# Patient Record
Sex: MALE | Race: WHITE | NOT HISPANIC OR LATINO | Employment: PART TIME | URBAN - METROPOLITAN AREA
[De-identification: names, ages, dates, MRNs, and addresses within clinical notes are randomized per-mention and may not be internally consistent; named-entity substitution may affect disease eponyms.]

---

## 2020-03-11 ENCOUNTER — OFFICE VISIT (OUTPATIENT)
Dept: PEDIATRICS | Facility: CLINIC | Age: 50
End: 2020-03-11
Payer: COMMERCIAL

## 2020-03-11 VITALS
BODY MASS INDEX: 32.96 KG/M2 | HEART RATE: 119 BPM | TEMPERATURE: 98.9 F | RESPIRATION RATE: 18 BRPM | SYSTOLIC BLOOD PRESSURE: 157 MMHG | WEIGHT: 229.7 LBS | OXYGEN SATURATION: 96 % | DIASTOLIC BLOOD PRESSURE: 105 MMHG

## 2020-03-11 DIAGNOSIS — Z78.9 ALCOHOL USE: ICD-10-CM

## 2020-03-11 DIAGNOSIS — Z72.0 TOBACCO ABUSE DISORDER: ICD-10-CM

## 2020-03-11 DIAGNOSIS — I10 ESSENTIAL HYPERTENSION: ICD-10-CM

## 2020-03-11 DIAGNOSIS — S06.0X0D CONCUSSION WITHOUT LOSS OF CONSCIOUSNESS, SUBSEQUENT ENCOUNTER: ICD-10-CM

## 2020-03-11 PROBLEM — S06.0X0A CONCUSSION WITHOUT LOSS OF CONSCIOUSNESS: Status: ACTIVE | Noted: 2020-03-11

## 2020-03-11 PROBLEM — F10.90 ALCOHOL USE: Status: ACTIVE | Noted: 2020-03-11

## 2020-03-11 PROCEDURE — 99204 OFFICE O/P NEW MOD 45 MIN: CPT | Performed by: INTERNAL MEDICINE

## 2020-03-11 RX ORDER — LISINOPRIL 10 MG/1
10 TABLET ORAL DAILY
Qty: 60 TABLET | Refills: 0 | Status: SHIPPED | OUTPATIENT
Start: 2020-03-11 | End: 2020-05-01

## 2020-03-11 ASSESSMENT — PAIN SCALES - GENERAL: PAINLEVEL: NO PAIN (0)

## 2020-03-11 NOTE — PROGRESS NOTES
Subjective     Mary Lopez is a 49 year old male who presents to clinic today for the following health issues:    HPI   ED/UC Followup:    Facility:  [facility]  Date of visit: 03/08/2020  Reason for visit: Concussion of the head after pt suffered a fall  Current Status: Pt states on today visit that he has been feeling much better after he was seeing at the ER but still recovering from the concussion.     49-year-old gentleman comes in for follow-up of concussion.  States in the evening of 3/7/2020 he slipped on a staircase hitting his head against the wall causing a dent on the sheet rock.  He does not feel that he had lost consciousness.  He had drank alcohol that evening.  He feels he was not drunk.  The next day he his head felt heavy and the brain felt foggy.  His wife felt that his speech was not fluent.  So she brought him to the emergency room.  CT of the brain was negative.  He had a prior history of concussion at age 13.    Every day he has improved.  His brain is not feeling as foggy now.  His speech is returned to normal though he feels it is perhaps not 100% back.  According to his wife his memory seems intact.    Patient's blood pressure in the clinic was elevated and he gives history of having elevated blood pressure for last few years.  Never started on any medication.  He eats quite a bit of salt.  Confesses to drinking at least 2 shots of liquor like vodka a day.  No DWI.  Does not feel that he is an alcoholic.  He has had periods where he has not been drinking.  He chews tobacco and occasionally will smoke a cigar.      Patient Active Problem List   Diagnosis     Concussion without loss of consciousness     Essential hypertension     Tobacco abuse disorder     Alcohol use     History reviewed. No pertinent surgical history.    Social History     Tobacco Use     Smoking status: Never Smoker     Smokeless tobacco: Former User     Types: Chew   Substance Use Topics      Alcohol use: Not on file     History reviewed. No pertinent family history.      Current Outpatient Medications   Medication Sig Dispense Refill     lisinopril (ZESTRIL) 10 MG tablet Take 1 tablet (10 mg) by mouth daily 60 tablet 0     NO ACTIVE MEDICATIONS .       Allergies   Allergen Reactions     Fish Swelling     Food      baked beans         Reviewed and updated as needed this visit by Provider  Tobacco  Allergies  Meds  Problems  Med Hx  Surg Hx  Fam Hx         Review of Systems   ROS COMP: Constitutional, HEENT, cardiovascular, pulmonary, GI, , musculoskeletal, neuro, skin, endocrine and psych systems are negative, except as otherwise noted.      Objective    BP (!) 157/105 (BP Location: Right arm, Patient Position: Sitting, Cuff Size: Adult Large)   Pulse 119   Temp 98.9  F (37.2  C) (Oral)   Resp 18   Wt 104.2 kg (229 lb 11.2 oz)   SpO2 96%   BMI 32.96 kg/m    Body mass index is 32.96 kg/m .  Physical Exam   GENERAL: healthy, alert and no distress  EYES: Eyes grossly normal to inspection, PERRL and conjunctivae and sclerae normal  HENT: ear canals and TM's normal, nose and mouth without ulcers or lesions  NECK: no adenopathy, no asymmetry, masses, or scars and thyroid normal to palpation  RESP: lungs clear to auscultation - no rales, rhonchi or wheezes  CV: regular rate and rhythm, normal S1 S2, no S3 or S4, no murmur, click or rub, no peripheral edema and peripheral pulses strong  ABDOMEN: soft, nontender, no hepatosplenomegaly, no masses and bowel sounds normal  MS: no gross musculoskeletal defects noted, no edema  NEURO: Normal strength and tone, mentation intact and speech normal.  Patient's orientation x3 was normal.  Recall was good.    Diagnostic Test Results:  Labs reviewed in Epic        Assessment & Plan     1.  Concussion without loss of consciousness.  He is recovering well.  I think he could return to work by March 16, 2020.  A letter was given.  2.  Essential hypertension.   Stressed importance of low-salt diet.  Stressed the importance starting a medication for his hypertension.  He agreed and I placed him on lisinopril  10 mg a day potential side effects including electrolyte imbalance, cough and angioedema discussed.  He will return for follow-up in him month with BMP.  3.  Tobacco abuse disorder.  He chews tobacco.  Counseled regarding that.  4.  Alcohol abuse.  Advise to quit alcohol or at least reduce it dramatically.  5.  He had a normal lipid profile January 2019 at [facility].           Return in about 4 weeks (around 4/8/2020) for visit with fasting lab.    Jimmie Hoyt MD  [facility]

## 2020-03-11 NOTE — LETTER
March 11, 2020      Mary Lopez  [address] [address]        To Whom It May Concern:    Mary Lopez was seen in our clinic. He may return to work on 03/16/2020.      Sincerely,        Jimmie Hoyt MD

## 2020-03-11 NOTE — LETTER
March 11, 2020      Mary EPPERSON Jessica  [address] [address]        To Whom It May Concern:    Mary EPPERSON Jessica was seen in our clinic. He may return to work on 02/16/2020    Sincerely,        Jimmie Hoyt MD

## 2020-03-22 ENCOUNTER — HEALTH MAINTENANCE LETTER (OUTPATIENT)
Age: 50
End: 2020-03-22

## 2020-04-06 ENCOUNTER — MYC MEDICAL ADVICE (OUTPATIENT)
Dept: PEDIATRICS | Facility: CLINIC | Age: 50
End: 2020-04-06

## 2020-04-28 ENCOUNTER — DOCUMENTATION ONLY (OUTPATIENT)
Dept: LAB | Facility: CLINIC | Age: 50
End: 2020-04-28

## 2020-04-28 NOTE — PROGRESS NOTES
Patient has lab only appointment scheduled for 5/2/20.   Due to current coronavirus pandemic, please consider whether these lab tests can be deferred.     Please open ORDER REVIEW, review orders, and then confirm or defer orders ASAP.  Enter <dot>keep or <dot>defer smart phrase into NOTES, complete documentation, and route encounter.  [facility]: /team  [facility]: individual provider pool  [facility] primary care:   [facility] specialty: scheduling paty Luciano on 4/28/2020 at 4:39 PM

## 2020-04-29 DIAGNOSIS — I10 ESSENTIAL HYPERTENSION: ICD-10-CM

## 2020-04-29 NOTE — TELEPHONE ENCOUNTER
Health Call Center    Phone Message    May a detailed message be left on voicemail: yes     Reason for Call: Medication Refill Request    Has the patient contacted the pharmacy for the refill? Yes   Name of medication being requested: lisinopril  Provider who prescribed the medication: Lai  Pharm[facility]: [facility]  Date medication is needed: 5/13/2020, patient states he has about 2 weeks left       Spoke with patient about rescheduling lab from 5/2 to lab and visit with provider in late June or early July per staff message from Dr Hoyt.  Patient to call back to schedule.    Action Taken: Message routed to:  Primary Care p [phone]    Travel Screening: Not Applicable

## 2020-04-29 NOTE — TELEPHONE ENCOUNTER
Lisinopril  Last Written Prescription Date:  3/11/2020  Last Fill Quantity: 60,  # refills: 0   Last office visit: 3/11/2020 with prescribing provider:  Lai Hopper Office Visit:

## 2020-04-30 NOTE — TELEPHONE ENCOUNTER
Centralized Medication Refill Team note:     lisinopril (ZESTRIL) 10 MG tablet    Last Written Prescription Date:  3/11/20  Last Fill Quantity: 60,   # refills: 0  Last Office Visit : 3/11/20  Future Office visit:  None, recent cancellations.    Routing refill request to provider for review/approval because:    Fails refill protocol due to labs overdue( orders in place)  Cr 1/14/2019 0.70(L)  K 1/14/2019 =3.5  Elevated BP:  03/11/20 (!) 157/105   10/16/15 (!) 169/115   05/02/10 124/77

## 2020-05-01 RX ORDER — LISINOPRIL 10 MG/1
10 TABLET ORAL DAILY
Qty: 60 TABLET | Refills: 0 | Status: SHIPPED | OUTPATIENT
Start: 2020-05-01 | End: 2020-07-06

## 2020-06-24 ENCOUNTER — TELEPHONE (OUTPATIENT)
Dept: PEDIATRICS | Facility: CLINIC | Age: 50
End: 2020-06-24

## 2020-06-24 NOTE — TELEPHONE ENCOUNTER
----- Message from Ai Flaherty sent at 4/29/2020 11:45 AM CDT -----  Regarding: late June/July  Spoke with patient, requests Saturday if possible  ----- Message -----  From: Jimmie Hoyt MD  Sent: 4/28/2020   4:47 PM CDT  To: Ai Flaherty    Please call patient and inform him I like to reschedule his lab to feature in 2 to 3 months. I want to see him in clinic with lab for follow-up on hypertension and blood pressure medication. I will place lab order to be done in 2 months

## 2020-06-24 NOTE — TELEPHONE ENCOUNTER
Left voicemail for patient stating we will not have Saturday clinics in July or August.  Offered [facility] location, provided phone number, as they have Saturday hours and patient lives in [place].    Dodie Flaherty  Primary Care   [facility] [place]

## 2020-07-03 ENCOUNTER — DOCUMENTATION ONLY (OUTPATIENT)
Dept: LAB | Facility: CLINIC | Age: 50
End: 2020-07-03

## 2020-07-03 DIAGNOSIS — I10 ESSENTIAL HYPERTENSION: Primary | ICD-10-CM

## 2020-07-03 NOTE — PROGRESS NOTES
Your patient has a lab appointment on 7/5/20 for fasting labs. They are due for a lipid panel and at this time there is only a BMP ordered. Please sign order for Lipid panel today. Thank you.    [facility]  Zeinab Luciano on 7/3/2020 at 8:02 AM

## 2020-07-05 DIAGNOSIS — I10 ESSENTIAL HYPERTENSION: ICD-10-CM

## 2020-07-05 PROCEDURE — 80048 BASIC METABOLIC PNL TOTAL CA: CPT | Performed by: INTERNAL MEDICINE

## 2020-07-05 PROCEDURE — 80061 LIPID PANEL: CPT | Performed by: INTERNAL MEDICINE

## 2020-07-05 PROCEDURE — 36415 COLL VENOUS BLD VENIPUNCTURE: CPT | Performed by: INTERNAL MEDICINE

## 2020-07-06 LAB
ANION GAP SERPL CALCULATED.3IONS-SCNC: 9 MMOL/L (ref 3–14)
BUN SERPL-MCNC: 13 MG/DL (ref 7–30)
CALCIUM SERPL-MCNC: 9.7 MG/DL (ref 8.5–10.1)
CHLORIDE SERPL-SCNC: 102 MMOL/L (ref 94–109)
CHOLEST SERPL-MCNC: 183 MG/DL
CO2 SERPL-SCNC: 28 MMOL/L (ref 20–32)
CREAT SERPL-MCNC: 0.95 MG/DL (ref 0.66–1.25)
GFR SERPL CREATININE-BSD FRML MDRD: >90 ML/MIN/{1.73_M2}
GLUCOSE SERPL-MCNC: 113 MG/DL (ref 70–99)
HDLC SERPL-MCNC: 50 MG/DL
LDLC SERPL CALC-MCNC: 87 MG/DL
NONHDLC SERPL-MCNC: 133 MG/DL
POTASSIUM SERPL-SCNC: 4.2 MMOL/L (ref 3.4–5.3)
SODIUM SERPL-SCNC: 139 MMOL/L (ref 133–144)
TRIGL SERPL-MCNC: 229 MG/DL

## 2020-08-03 ENCOUNTER — VIRTUAL VISIT (OUTPATIENT)
Dept: PEDIATRICS | Facility: CLINIC | Age: 50
End: 2020-08-03
Payer: COMMERCIAL

## 2020-08-03 DIAGNOSIS — Z78.9 ALCOHOL USE: ICD-10-CM

## 2020-08-03 DIAGNOSIS — Z72.0 TOBACCO ABUSE DISORDER: ICD-10-CM

## 2020-08-03 DIAGNOSIS — I10 ESSENTIAL HYPERTENSION: Primary | ICD-10-CM

## 2020-08-03 PROCEDURE — 99213 OFFICE O/P EST LOW 20 MIN: CPT | Mod: TEL | Performed by: INTERNAL MEDICINE

## 2020-08-03 RX ORDER — LISINOPRIL 20 MG/1
20 TABLET ORAL DAILY
Qty: 90 TABLET | Refills: 1 | Status: SHIPPED | OUTPATIENT
Start: 2020-08-03 | End: 2020-12-07

## 2020-08-03 NOTE — PROGRESS NOTES
"Mary Lopez is a 49 year old male who is being evaluated via a billable telephone visit.      The patient has been notified of following:     \"This telephone visit will be conducted via a call between you and your physician/provider. We have found that certain health care needs can be provided without the need for a physical exam.  This service lets us provide the care you need with a short phone conversation.  If a prescription is necessary we can send it directly to your pharmacy.  If lab work is needed we can place an order for that and you can then stop by our lab to have the test done at a later time.    Telephone visits are billed at different rates depending on your insurance coverage. During this emergency period, for some insurers they may be billed the same as an in-person visit.  Please reach out to your insurance provider with any questions.    If during the course of the call the physician/provider feels a telephone visit is not appropriate, you will not be charged for this service.\"    Patient has given verbal consent for Telephone visit?  Yes    What phone number would you like to be contacted at? [phone]    How would you like to obtain your AVS? Mail a copy    Subjective     Mary Lopez is a 49 year old male who presents via phone visit today for the following health issues:    HPI    The patient states that he has been feeling good and has cut back his alcohol use.  He continues to chew tobacco.  He is eating low-salt diet.  He has not yet started exercising.  His blood pressure has been good when checked at home around 130s systolic and diastolic 88-92.  Denies chest pain or shortness of breath.      Hypertension Follow-up      Do you check your blood pressure regularly outside of the clinic? Yes     Are you following a low salt diet? Yes    Are your blood pressures ever more than 140 on the top number (systolic) OR more   than 90 on the bottom number (diastolic), for example " 140/90? Yes  130/88-92      How many servings of fruits and vegetables do you eat daily?  4 or more    On average, how many sweetened beverages do you drink each day (Examples: soda, juice, sweet tea, etc.  Do NOT count diet or artificially sweetened beverages)?   (2) 4 ounce bottles of onesimo D    How many days per week do you exercise enough to make your heart beat faster? 0    How many minutes a day do you exercise enough to make your heart beat faster? 0    How many days per week do you miss taking your medication? 0             Patient Active Problem List   Diagnosis     Concussion without loss of consciousness     Essential hypertension     Tobacco abuse disorder     Alcohol use     History reviewed. No pertinent surgical history.    Social History     Tobacco Use     Smoking status: Never Smoker     Smokeless tobacco: Former User     Types: Chew   Substance Use Topics     Alcohol use: Not on file     History reviewed. No pertinent family history.      Current Outpatient Medications   Medication Sig Dispense Refill     lisinopril (ZESTRIL) 10 MG tablet Take 1 tablet (10 mg) by mouth daily 90 tablet 3     Allergies   Allergen Reactions     Fish Swelling     Food      baked beans     BP Readings from Last 3 Encounters:   03/11/20 (!) 157/105   10/16/15 (!) 169/115   05/02/10 124/77    Wt Readings from Last 3 Encounters:   03/11/20 104.2 kg (229 lb 11.2 oz)   10/16/15 99.8 kg (220 lb)   05/02/10 101.2 kg (223 lb)                    Reviewed and updated as needed this visit by Provider         Review of Systems   Constitutional, HEENT, cardiovascular, pulmonary, GI, , musculoskeletal, neuro, skin, endocrine and psych systems are negative, except as otherwise noted.       Objective   Reported vitals:  There were no vitals taken for this visit.   healthy, alert and no distress  PSYCH: Alert and oriented times 3; coherent speech, normal   rate and volume, able to articulate logical thoughts, able   to abstract  reason, no tangential thoughts, no hallucinations   or delusions  His affect is normal  RESP: No cough, no audible wheezing, able to talk in full sentences  Remainder of exam unable to be completed due to telephone visits    Diagnostic Test Results:  Labs reviewed in Epic        Assessment/Plan:    1.  Essential hypertension.  Diastolic blood pressure still running a high at home so I advised him to increase lisinopril to 20 mg a day and follow-up with me in 3 months.  His recent electrolytes and renal function were fine so he can continue to take ACE inhibitor without concern.  2.  Alcohol use.  He has cut back.  3.  Tobacco abuse disorder and former chewing tobacco.  Counseled.      Phone call duration:  12 minutes    Jimmie Hoyt MD

## 2020-08-05 ENCOUNTER — NURSE TRIAGE (OUTPATIENT)
Dept: NURSING | Facility: CLINIC | Age: 50
End: 2020-08-05

## 2020-08-06 NOTE — TELEPHONE ENCOUNTER
"Patient's wife calling - consent on file.    Tuesday night around 8:00 pm he had chest pain, heart palpatations and shortness of breath.  He vomited.  Refused to go to ED.  Then those symptoms resolved.    Has been feeling fatigued today and a little bit out of it.   Says his blood pressure \"has been all over the place today.\"  Is not able to give me any number.  Asked to have patient check his blood pressure now.  Blood pressure now is 99/59 with pulse of 98.  Says his normal blood pressure is in the 130's.    Triaged to disposition of Go to ED Now.  Advised to call 911 if he becomes confused or to weak to stand.    Jacqui Carney, RN  Triage Nurse Advisor    COVID 19 Nurse Triage Plan/Patient Instructions    Please be aware that novel coronavirus (COVID-19) may be circulating in the community. If you develop symptoms such as fever, cough, or SOB or if you have concerns about the presence of another infection including coronavirus (COVID-19), please contact your health care provider or visit [email or web address].     Disposition/Instructions    ED Visit recommended. Follow protocol based instructions.     Bring Your Own Device:  Please also bring your smart device(s) (smart phones, tablets, laptops) and their charging cables for your personal use and to communicate with your care team during your visit.    Thank you for taking steps to prevent the spread of this virus.  o Limit your contact with others.  o Wear a simple mask to cover your cough.  o Wash your hands well and often.    Resources    [facility]: About COVID-19: [email or web address]    CDC: What to Do If You're Sick: www.cdc.gov/coronavirus/2019-ncov/about/steps-when-sick.html    CDC: Ending Home Isolation: www.cdc.gov/coronavirus/2019-ncov/hcp/disposition-in-home-patients.html     CDC: Caring for Someone: www.cdc.gov/coronavirus/2019-ncov/if-you-are-sick/care-for-someone.html     [facility]: Interim Guidance for Hospital Discharge to Home: " "www.health.[facility].[place].us/diseases/coronavirus/hcp/hospdischarge.pdf    [facility] clinical trials (COVID-19 research studies): clinicalaffairs.[facility]/umn-clinical-trials     Below are the COVID-19 hotlines at the [place] Department of [employer] ([facility]). Interpreters are available.   o For health questions: Call 627-553-6563 or 1-488.128.4236 (7 a.m. to 7 p.m.)  o For questions about schools and childcare: Call 237-061-2530 or 1-320.921.4478 (7 a.m. to 7 p.m.)       Reason for Disposition    [1] Fall in systolic BP > 20 mm Hg from normal AND [2] dizzy, lightheaded, or weak    Additional Information    Negative: Started suddenly after an allergic medicine, an allergic food, or bee sting    Negative: Shock suspected (e.g., cold/pale/clammy skin, too weak to stand, low BP, rapid pulse)    Negative: Difficult to awaken or acting confused (e.g., disoriented, slurred speech)    Negative: Fainted    Negative: [1] Systolic BP < 90 AND [2] dizzy, lightheaded, or weak    Negative: Chest pain    Negative: Bleeding (e.g., vomiting blood, rectal bleeding or tarry stools, severe vaginal bleeding)(Exception: fainted from sight of small amount of blood; small cut or abrasion)    Negative: Extra heart beats or heart is beating fast  (i.e., \"palpitations\")    Negative: Sounds like a life-threatening emergency to the triager    Negative: [1] Systolic BP < 80 AND [2] NOT dizzy, lightheaded or weak    Negative: Abdominal pain    Negative: Fever > 100.5 F (38.1 C)    Negative: Major surgery in the past month    Negative: [1] Drinking very little AND [2] dehydration suspected (e.g., no urine > 12 hours, very dry mouth, very lightheaded)    Protocols used: LOW BLOOD PRESSURE-A-AH      "

## 2020-12-07 ENCOUNTER — VIRTUAL VISIT (OUTPATIENT)
Dept: PEDIATRICS | Facility: CLINIC | Age: 50
End: 2020-12-07
Payer: COMMERCIAL

## 2020-12-07 VITALS — DIASTOLIC BLOOD PRESSURE: 83 MMHG | HEART RATE: 83 BPM | SYSTOLIC BLOOD PRESSURE: 142 MMHG

## 2020-12-07 DIAGNOSIS — I10 ESSENTIAL HYPERTENSION: Primary | ICD-10-CM

## 2020-12-07 DIAGNOSIS — Z72.0 TOBACCO ABUSE DISORDER: ICD-10-CM

## 2020-12-07 DIAGNOSIS — Z78.9 ALCOHOL USE: ICD-10-CM

## 2020-12-07 PROCEDURE — 99213 OFFICE O/P EST LOW 20 MIN: CPT | Mod: TEL | Performed by: INTERNAL MEDICINE

## 2020-12-07 RX ORDER — LISINOPRIL 20 MG/1
20 TABLET ORAL DAILY
Qty: 90 TABLET | Refills: 2 | Status: SHIPPED | OUTPATIENT
Start: 2020-12-07 | End: 2021-12-06

## 2020-12-07 NOTE — PROGRESS NOTES
"Mary Lopez is a 50 year old male who is being evaluated via a billable telephone visit.      The patient has been notified of following:     \"This telephone visit will be conducted via a call between you and your physician/provider. We have found that certain health care needs can be provided without the need for a physical exam.  This service lets us provide the care you need with a short phone conversation.  If a prescription is necessary we can send it directly to your pharmacy.  If lab work is needed we can place an order for that and you can then stop by our lab to have the test done at a later time.    Telephone visits are billed at different rates depending on your insurance coverage. During this emergency period, for some insurers they may be billed the same as an in-person visit.  Please reach out to your insurance provider with any questions.    If during the course of the call the physician/provider feels a telephone visit is not appropriate, you will not be charged for this service.\"    Patient has given verbal consent for Telephone visit?  Yes    What phone number would you like to be contacted at? [phone]    How would you like to obtain your AVS? Shlomo Borrego     Mary Lopez is a 50 year old male who presents via phone visit today for the following health issues:    HPI     Hypertension Follow-up      Do you check your blood pressure regularly outside of the clinic? Yes     Are you following a low salt diet? Yes    Are your blood pressures ever more than 140 on the top number (systolic) OR more   than 90 on the bottom number (diastolic), for example 140/90? No 134/80 at home      How many servings of fruits and vegetables do you eat daily?  2-3    On average, how many sweetened beverages do you drink each day (Examples: soda, juice, sweet tea, etc.  Do NOT count diet or artificially sweetened beverages)?   1    How many days per week do you exercise enough to make your " heart beat faster? 0    How many minutes a day do you exercise enough to make your heart beat faster? 0    How many days per week do you miss taking your medication? 0    The patient indicates that he is doing well with increased dose of lisinopril to 20 mg a day.  Now his blood pressure at home is 130/80 when he checks it.  Overall he feels good.  He has cut back on his alcohol use considerably.  He is also following a low-salt diet.  He still chews tobacco.  Takes his medication regularly.  No other symptoms such as chest pain or shortness of breath.        Review of Systems   Constitutional, HEENT, cardiovascular, pulmonary, GI, , musculoskeletal, neuro, skin, endocrine and psych systems are negative, except as otherwise noted.       Objective          Vitals:  No vitals were obtained today due to virtual visit.    healthy, alert and no distress  PSYCH: Alert and oriented times 3; coherent speech, normal   rate and volume, able to articulate logical thoughts, able   to abstract reason, no tangential thoughts, no hallucinations   or delusions  His affect is normal  RESP: No cough, no audible wheezing, able to talk in full sentences  Remainder of exam unable to be completed due to telephone visits            Assessment/Plan:    1.  Essential hypertension seems to be now under better control with lisinopril 20 mg a day.  Continue the same.  Return for follow-up in 6 months with lipid profile and BMP.  2.  Tobacco abuse disorder in form of chewing tobacco.  Discussed reduction or discontinuation of it.  3.  History of excessive alcohol use.  Patient has cut back significantly which is good.          Return in about 6 months (around 6/7/2021) for visit with fasting lab.    Jimmie Hoyt MD  [facility]    Phone call duration:  11 minutes

## 2021-01-15 ENCOUNTER — HEALTH MAINTENANCE LETTER (OUTPATIENT)
Age: 51
End: 2021-01-15

## 2021-01-29 ENCOUNTER — MYC MEDICAL ADVICE (OUTPATIENT)
Dept: FAMILY MEDICINE | Facility: CLINIC | Age: 51
End: 2021-01-29

## 2021-04-02 ENCOUNTER — IMMUNIZATION (OUTPATIENT)
Dept: NURSING | Facility: CLINIC | Age: 51
End: 2021-04-02
Payer: COMMERCIAL

## 2021-04-02 PROCEDURE — 0001A PR COVID VAC PFIZER DIL RECON 30 MCG/0.3 ML IM: CPT

## 2021-04-02 PROCEDURE — 91300 PR COVID VAC PFIZER DIL RECON 30 MCG/0.3 ML IM: CPT

## 2021-04-23 ENCOUNTER — IMMUNIZATION (OUTPATIENT)
Dept: NURSING | Facility: CLINIC | Age: 51
End: 2021-04-23
Attending: PEDIATRICS
Payer: COMMERCIAL

## 2021-04-23 PROCEDURE — 0002A PR COVID VAC PFIZER DIL RECON 30 MCG/0.3 ML IM: CPT

## 2021-04-23 PROCEDURE — 91300 PR COVID VAC PFIZER DIL RECON 30 MCG/0.3 ML IM: CPT

## 2021-05-16 ENCOUNTER — HEALTH MAINTENANCE LETTER (OUTPATIENT)
Age: 51
End: 2021-05-16

## 2021-09-05 ENCOUNTER — HEALTH MAINTENANCE LETTER (OUTPATIENT)
Age: 51
End: 2021-09-05

## 2021-12-04 ENCOUNTER — LAB (OUTPATIENT)
Dept: LAB | Facility: CLINIC | Age: 51
End: 2021-12-04
Payer: COMMERCIAL

## 2021-12-04 DIAGNOSIS — I10 ESSENTIAL HYPERTENSION: ICD-10-CM

## 2021-12-04 PROCEDURE — 36415 COLL VENOUS BLD VENIPUNCTURE: CPT

## 2021-12-04 PROCEDURE — 80061 LIPID PANEL: CPT

## 2021-12-04 PROCEDURE — 80048 BASIC METABOLIC PNL TOTAL CA: CPT

## 2021-12-06 ENCOUNTER — MYC MEDICAL ADVICE (OUTPATIENT)
Dept: FAMILY MEDICINE | Facility: CLINIC | Age: 51
End: 2021-12-06
Payer: COMMERCIAL

## 2021-12-06 DIAGNOSIS — I10 ESSENTIAL HYPERTENSION: ICD-10-CM

## 2021-12-06 LAB
ANION GAP SERPL CALCULATED.3IONS-SCNC: 12 MMOL/L (ref 3–14)
BUN SERPL-MCNC: 21 MG/DL (ref 7–30)
CALCIUM SERPL-MCNC: 9.5 MG/DL (ref 8.5–10.1)
CHLORIDE BLD-SCNC: 97 MMOL/L (ref 94–109)
CHOLEST SERPL-MCNC: 231 MG/DL
CO2 SERPL-SCNC: 25 MMOL/L (ref 20–32)
CREAT SERPL-MCNC: 1.43 MG/DL (ref 0.66–1.25)
FASTING STATUS PATIENT QL REPORTED: YES
GFR SERPL CREATININE-BSD FRML MDRD: 57 ML/MIN/1.73M2
GLUCOSE BLD-MCNC: 117 MG/DL (ref 70–99)
HDLC SERPL-MCNC: 76 MG/DL
LDLC SERPL CALC-MCNC: 120 MG/DL
NONHDLC SERPL-MCNC: 155 MG/DL
POTASSIUM BLD-SCNC: 3.8 MMOL/L (ref 3.4–5.3)
SODIUM SERPL-SCNC: 134 MMOL/L (ref 133–144)
TRIGL SERPL-MCNC: 174 MG/DL

## 2021-12-06 RX ORDER — LISINOPRIL 20 MG/1
20 TABLET ORAL DAILY
Qty: 60 TABLET | Refills: 0 | Status: SHIPPED | OUTPATIENT
Start: 2021-12-06 | End: 2021-12-07

## 2021-12-07 ENCOUNTER — VIRTUAL VISIT (OUTPATIENT)
Dept: FAMILY MEDICINE | Facility: CLINIC | Age: 51
End: 2021-12-07
Payer: COMMERCIAL

## 2021-12-07 DIAGNOSIS — N28.9 RENAL INSUFFICIENCY, MILD: Primary | ICD-10-CM

## 2021-12-07 DIAGNOSIS — E78.5 MILD HYPERLIPIDEMIA: ICD-10-CM

## 2021-12-07 DIAGNOSIS — I10 ESSENTIAL HYPERTENSION: ICD-10-CM

## 2021-12-07 DIAGNOSIS — R73.01 IFG (IMPAIRED FASTING GLUCOSE): ICD-10-CM

## 2021-12-07 PROCEDURE — 99213 OFFICE O/P EST LOW 20 MIN: CPT | Mod: 95 | Performed by: INTERNAL MEDICINE

## 2021-12-07 RX ORDER — LISINOPRIL 30 MG/1
30 TABLET ORAL DAILY
Qty: 60 TABLET | Refills: 1 | Status: SHIPPED | OUTPATIENT
Start: 2021-12-07 | End: 2022-02-09

## 2021-12-07 NOTE — PROGRESS NOTES
Mary is a 51 year old who is being evaluated via a billable video visit.      How would you like to obtain your AVS? MyChart  If the video visit is dropped, the invitation should be resent by: Text to cell phone: ,mobile  Will anyone else be joining your video visit? No    Video Start Time: 5:23 PM    Assessment & Plan     1.  Essential hypertension.  Blood pressure according to patient at home is within upper limits of normal.  Elevated his renal function has worsened.  After discussion with him and his wife agreed to increase lisinopril to 30 mg a day and follow-up with me in January.  I like to see him in person.  2.  Mild renal insufficiency new onset creatinine 1.43 and GFR 57 measured on 12/4/2020.  Significant change from his baseline.  Advised to drink plenty of water at least between 5 to 8 glasses a day and when he returns in January we will recheck the function.  Discussed the living causes of chronic kidney disease particularly uncontrolled hypertension diabetes etc.  3.  Impaired fasting glucose with blood sugar 117 measured twelve 4.1.  4.  Alcohol use.  He has cut back now has couple of drinks 4 times a week.  5.  Tobacco use disorder-chews tobacco.  6.  Mild hyperlipidemia with cholesterol 231 HDL 76  triglyceride was 74 measured on 12/4/2021.    We will repeat BMP and also check UA when patient comes in.    Jimmie Hoyt MD  [facility]    Elmo Hernandez is a 51 year old who presents for the following health issues     History of Present Illness       He eats 4 or more servings of fruits and vegetables daily.He consumes 0 sweetened beverage(s) daily.He exercises with enough effort to increase his heart rate 10 to 19 minutes per day.  He exercises with enough effort to increase his heart rate 3 or less days per week.   He is taking medications regularly.       51-year-old gentleman with history of hypertension scheduled for follow-up.  He had labs done earlier  which showed that he has developed renal insufficiency.  He states he has been feeling fine.  He recently had a DOT physical and his blood pressure was within upper limits of normal.  At home he has been getting 133-135 systolic and diastolic complaints.  He has cut back his alcohol consumption and now has couple of drinks a month 4 nights a week.  Still chews tobacco.  Denies chest pain or shortness of breath.      Review of Systems   Constitutional, HEENT, cardiovascular, pulmonary, GI, , musculoskeletal, neuro, skin, endocrine and psych systems are negative, except as otherwise noted.      Objective           Vitals:  No vitals were obtained today due to virtual visit.    Physical Exam   GENERAL: Healthy, alert and no distress  EYES: Eyes grossly normal to inspection.  No discharge or erythema, or obvious scleral/conjunctival abnormalities.  RESP: No audible wheeze, cough, or visible cyanosis.  No visible retractions or increased work of breathing.    SKIN: Visible skin clear. No significant rash, abnormal pigmentation or lesions.  NEURO: Cranial nerves grossly intact.  Mentation and speech appropriate for age.  PSYCH: Mentation appears normal, affect normal/bright, judgement and insight intact, normal speech and appearance well-groomed.      Lab performed on 12/4/2021 showed creatinine of 1.43 up from previously measured 0.95.  GFR went down to 57 from previous admission.  Cholesterol 231, HDL 76,  triglycerides 74 and fasting blood sugar 117.          Video-Visit Details    Type of service:  Video Visit    Video End Time:5:46 PM    Originating Location (pt. Location): Home    Distant Location (provider location):  [facility]     Platform used for Video Visit: [employer]

## 2022-01-17 ENCOUNTER — DOCUMENTATION ONLY (OUTPATIENT)
Dept: LAB | Facility: CLINIC | Age: 52
End: 2022-01-17
Payer: COMMERCIAL

## 2022-01-17 DIAGNOSIS — R73.01 IFG (IMPAIRED FASTING GLUCOSE): Primary | ICD-10-CM

## 2022-01-17 DIAGNOSIS — I10 ESSENTIAL HYPERTENSION: ICD-10-CM

## 2022-02-18 ENCOUNTER — VIRTUAL VISIT (OUTPATIENT)
Dept: FAMILY MEDICINE | Facility: CLINIC | Age: 52
End: 2022-02-18
Payer: COMMERCIAL

## 2022-02-18 DIAGNOSIS — Z13.1 SCREENING FOR DIABETES MELLITUS (DM): ICD-10-CM

## 2022-02-18 DIAGNOSIS — Z13.29 SCREENING FOR THYROID DISORDER: ICD-10-CM

## 2022-02-18 DIAGNOSIS — Z13.6 CARDIOVASCULAR SCREENING; LDL GOAL LESS THAN 130: Primary | ICD-10-CM

## 2022-02-18 DIAGNOSIS — Z12.5 SCREENING FOR PROSTATE CANCER: ICD-10-CM

## 2022-02-18 DIAGNOSIS — R73.09 ELEVATED GLUCOSE: ICD-10-CM

## 2022-02-18 DIAGNOSIS — I10 ESSENTIAL HYPERTENSION: ICD-10-CM

## 2022-02-18 PROCEDURE — 99214 OFFICE O/P EST MOD 30 MIN: CPT | Mod: GT | Performed by: NURSE PRACTITIONER

## 2022-02-18 RX ORDER — AMLODIPINE BESYLATE 10 MG/1
10 TABLET ORAL DAILY
Qty: 90 TABLET | Refills: 0
Start: 2022-02-18 | End: 2022-05-06

## 2022-02-18 RX ORDER — LISINOPRIL 40 MG/1
40 TABLET ORAL DAILY
Start: 2022-02-18 | End: 2022-05-06

## 2022-02-18 NOTE — PATIENT INSTRUCTIONS
PLAN:   1.   Symptomatic therapy suggested: Continue current medications as prescribed.     2.  Orders Placed This Encounter   Medications     amLODIPine (NORVASC) 10 MG tablet     Sig: Take 1 tablet (10 mg) by mouth daily     Dispense:  90 tablet     Refill:  0     lisinopril (ZESTRIL) 40 MG tablet     Sig: Take 1 tablet (40 mg) by mouth daily Last refill. Need in person clinic visit with Dr. Hoyt.     Orders Placed This Encounter   Procedures     Lipid panel reflex to direct LDL Fasting     Albumin Random Urine Quantitative with Creat Ratio     Comprehensive metabolic panel     TSH with free T4 reflex     Prostate Specific Antigen Screen     Hemoglobin A1c       3. Patient needs to follow up in if no improvement,or sooner if worsening of symptoms or other symptoms develop.  FUTURE LABS:       - Schedule a fasting blood draw   Will follow up and/or notify patient of  results via My Chart to determine further need for followup  Keep appointment for physical as planned

## 2022-03-12 ENCOUNTER — LAB (OUTPATIENT)
Dept: LAB | Facility: CLINIC | Age: 52
End: 2022-03-12
Payer: COMMERCIAL

## 2022-03-12 DIAGNOSIS — R73.01 IFG (IMPAIRED FASTING GLUCOSE): ICD-10-CM

## 2022-03-12 DIAGNOSIS — Z12.5 SCREENING FOR PROSTATE CANCER: ICD-10-CM

## 2022-03-12 DIAGNOSIS — R73.09 ELEVATED GLUCOSE: ICD-10-CM

## 2022-03-12 DIAGNOSIS — Z13.1 SCREENING FOR DIABETES MELLITUS (DM): ICD-10-CM

## 2022-03-12 DIAGNOSIS — I10 ESSENTIAL HYPERTENSION: ICD-10-CM

## 2022-03-12 DIAGNOSIS — Z13.6 CARDIOVASCULAR SCREENING; LDL GOAL LESS THAN 130: ICD-10-CM

## 2022-03-12 DIAGNOSIS — Z13.29 SCREENING FOR THYROID DISORDER: ICD-10-CM

## 2022-03-12 LAB — HBA1C MFR BLD: 5.7 % (ref 0–5.6)

## 2022-03-12 PROCEDURE — 36415 COLL VENOUS BLD VENIPUNCTURE: CPT

## 2022-03-12 PROCEDURE — 80053 COMPREHEN METABOLIC PANEL: CPT

## 2022-03-12 PROCEDURE — 83036 HEMOGLOBIN GLYCOSYLATED A1C: CPT

## 2022-03-12 PROCEDURE — 84443 ASSAY THYROID STIM HORMONE: CPT

## 2022-03-12 PROCEDURE — 80061 LIPID PANEL: CPT

## 2022-03-12 PROCEDURE — G0103 PSA SCREENING: HCPCS

## 2022-03-12 PROCEDURE — 82043 UR ALBUMIN QUANTITATIVE: CPT

## 2022-03-13 NOTE — RESULT ENCOUNTER NOTE
Hal Lopez,    Attached are your test results.  -A1C (diabetic test) is normal and indicates that your blood sugar has been in a normal range the last 3 months.   Please contact us if you have any questions.    Vanessa Cruz, CNP

## 2022-03-14 LAB
ALBUMIN SERPL-MCNC: 3.8 G/DL (ref 3.4–5)
ALP SERPL-CCNC: 120 U/L (ref 40–150)
ALT SERPL W P-5'-P-CCNC: 93 U/L (ref 0–70)
ANION GAP SERPL CALCULATED.3IONS-SCNC: 10 MMOL/L (ref 3–14)
AST SERPL W P-5'-P-CCNC: 186 U/L (ref 0–45)
BILIRUB SERPL-MCNC: 0.8 MG/DL (ref 0.2–1.3)
BUN SERPL-MCNC: 11 MG/DL (ref 7–30)
CALCIUM SERPL-MCNC: 9.2 MG/DL (ref 8.5–10.1)
CHLORIDE BLD-SCNC: 101 MMOL/L (ref 94–109)
CHOLEST SERPL-MCNC: 203 MG/DL
CO2 SERPL-SCNC: 25 MMOL/L (ref 20–32)
CREAT SERPL-MCNC: 0.7 MG/DL (ref 0.66–1.25)
CREAT UR-MCNC: 281 MG/DL
FASTING STATUS PATIENT QL REPORTED: YES
GFR SERPL CREATININE-BSD FRML MDRD: >90 ML/MIN/1.73M2
GLUCOSE BLD-MCNC: 135 MG/DL (ref 70–99)
HDLC SERPL-MCNC: 71 MG/DL
LDLC SERPL CALC-MCNC: 102 MG/DL
MICROALBUMIN UR-MCNC: 45 MG/L
MICROALBUMIN/CREAT UR: 16.01 MG/G CR (ref 0–17)
NONHDLC SERPL-MCNC: 132 MG/DL
POTASSIUM BLD-SCNC: 3.8 MMOL/L (ref 3.4–5.3)
PROT SERPL-MCNC: 8.7 G/DL (ref 6.8–8.8)
PSA SERPL-MCNC: 1.41 UG/L (ref 0–4)
SODIUM SERPL-SCNC: 136 MMOL/L (ref 133–144)
TRIGL SERPL-MCNC: 151 MG/DL
TSH SERPL DL<=0.005 MIU/L-ACNC: 2.18 MU/L (ref 0.4–4)

## 2022-04-25 ENCOUNTER — TELEPHONE (OUTPATIENT)
Dept: FAMILY MEDICINE | Facility: CLINIC | Age: 52
End: 2022-04-25
Payer: COMMERCIAL

## 2022-04-25 NOTE — TELEPHONE ENCOUNTER
Left message for patient to return call and reschedule appointment, Dr. Hoyt is not in clinic on 4/26/22

## 2022-05-03 ENCOUNTER — TELEPHONE (OUTPATIENT)
Dept: FAMILY MEDICINE | Facility: CLINIC | Age: 52
End: 2022-05-03

## 2022-05-03 ENCOUNTER — VIRTUAL VISIT (OUTPATIENT)
Dept: FAMILY MEDICINE | Facility: CLINIC | Age: 52
End: 2022-05-03
Payer: COMMERCIAL

## 2022-05-03 DIAGNOSIS — E78.5 MILD HYPERLIPIDEMIA: ICD-10-CM

## 2022-05-03 DIAGNOSIS — I10 ESSENTIAL HYPERTENSION: Primary | ICD-10-CM

## 2022-05-03 DIAGNOSIS — R73.01 IFG (IMPAIRED FASTING GLUCOSE): ICD-10-CM

## 2022-05-03 DIAGNOSIS — R74.8 ELEVATED LIVER ENZYMES: ICD-10-CM

## 2022-05-03 DIAGNOSIS — Z72.0 TOBACCO ABUSE DISORDER: ICD-10-CM

## 2022-05-03 DIAGNOSIS — Z78.9 ALCOHOL USE: ICD-10-CM

## 2022-05-03 PROCEDURE — 99214 OFFICE O/P EST MOD 30 MIN: CPT | Mod: 95 | Performed by: INTERNAL MEDICINE

## 2022-05-03 NOTE — PROGRESS NOTES
Mary is a 51 year old who is being evaluated via a billable video visit.      How would you like to obtain your AVS? MyChart  If the video visit is dropped, the invitation should be resent by: Text to cell phone: [phone]  Will anyone else be joining your video visit? No    Video Start Time: 5:12 PM    Assessment & Plan       1.  Essential hypertension.  Currently on amlodipine 10 mg a day lisinopril 40 mg a day.  According to the patient the blood pressure has been 135/85 range at home.  He will continue current treatment.  His electrolytes and renal function on 3/2/2022 was good.  2.  Impaired fasting glucose with recent blood sugar of 135 and A1c of 5.7.  Diet, exercise and obesity all issues.  Discussed that today.  3.  Mild hyperlipidemia with recent cholesterol of 203 HDL 71  measured on 3/2/2022.  4.  Elevated liver enzyme with  and ALT at 93 on 3/2/2022.  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  We discussed complete abstinence for at least 8 weeks and rechecking LFT.  He was agreeable to it.  Future LFT orders placed.  Otherwise he will need a work-up for elevated ALT/AST which would include lab and ultrasound.  5.  Alcohol abuse.  Discussed abstinence and rechecking liver test.  6.  Tobacco use disorder.        Jimmie Hoyt MD  [facility]   Mary is a 51 year old who presents for the following health issues  accompanied by his self.    History of Present Illness       Hypertension: He presents for follow up of hypertension.  He does not check blood pressure  regularly outside of the clinic. Outpatient blood pressures have not been over 140/90. He follows a low salt diet.     He eats 4 or more servings of fruits and vegetables daily.He consumes 1 sweetened beverage(s) daily.He exercises with enough effort to increase his heart rate 9 or less minutes per day.  He exercises with enough effort to increase his heart rate 3 or less days per week. He is  missing 1 dose(s) of medications per week.  He is not taking prescribed medications regularly due to remembering to take.       Video visit today for follow-up on hypertension and review recent lab performed on 3/2/2022.  He states that he is taking lisinopril 40 mg a day and amlodipine 10 mg a day.  Blood pressure at home now has been good in the 135/85 range.  He has cut back salt in his diet.  Continues to drink heavily up to 12 shots of hard liquor a week.  Used to drink even more.  Denies chest pain or shortness of breath.    Review of Systems   Constitutional, HEENT, cardiovascular, pulmonary, GI, , musculoskeletal, neuro, skin, endocrine and psych systems are negative, except as otherwise noted.      Objective           Vitals:  No vitals were obtained today due to virtual visit.    Physical Exam   GENERAL: Healthy, alert and no distress  EYES: Eyes grossly normal to inspection.  No discharge or erythema, or obvious scleral/conjunctival abnormalities.  RESP: No audible wheeze, cough, or visible cyanosis.  No visible retractions or increased work of breathing.    SKIN: Visible skin clear. No significant rash, abnormal pigmentation or lesions.  NEURO: Cranial nerves grossly intact.  Mentation and speech appropriate for age.  PSYCH: Mentation appears normal, affect normal/bright, judgement and insight intact, normal speech and appearance well-groomed.                Video-Visit Details    Type of service:  Video Visit    Video End Time:5:30 PM    Originating Location (pt. Location): Home    Distant Location (provider location):  [facility]     Platform used for Video Visit: [employer]

## 2022-05-03 NOTE — TELEPHONE ENCOUNTER
Attempted to reach pt to check in for virtual appt with Dr. Hoyt @ [phone]  No answer LM to call clinic back @ [phone] option #2. If pt calls back I can be reached directly @ [phone] or via TEAMS messenger. Will try to reach pt again prior to scheduled appt.  Kiera Aguilera CMA

## 2022-06-12 ENCOUNTER — HEALTH MAINTENANCE LETTER (OUTPATIENT)
Age: 52
End: 2022-06-12

## 2022-06-15 ENCOUNTER — MYC REFILL (OUTPATIENT)
Dept: FAMILY MEDICINE | Facility: CLINIC | Age: 52
End: 2022-06-15
Payer: COMMERCIAL

## 2022-06-15 DIAGNOSIS — I10 ESSENTIAL HYPERTENSION: ICD-10-CM

## 2022-06-15 RX ORDER — AMLODIPINE BESYLATE 10 MG/1
10 TABLET ORAL DAILY
Qty: 30 TABLET | Refills: 0 | Status: CANCELLED | OUTPATIENT
Start: 2022-06-15

## 2022-06-15 RX ORDER — LISINOPRIL 40 MG/1
40 TABLET ORAL DAILY
Qty: 30 TABLET | Refills: 0 | Status: CANCELLED | OUTPATIENT
Start: 2022-06-15

## 2022-07-25 ENCOUNTER — OFFICE VISIT (OUTPATIENT)
Dept: URGENT CARE | Facility: URGENT CARE | Age: 52
End: 2022-07-25
Payer: COMMERCIAL

## 2022-07-25 VITALS
BODY MASS INDEX: 34.15 KG/M2 | DIASTOLIC BLOOD PRESSURE: 82 MMHG | OXYGEN SATURATION: 97 % | HEART RATE: 99 BPM | TEMPERATURE: 98.2 F | WEIGHT: 238 LBS | SYSTOLIC BLOOD PRESSURE: 134 MMHG

## 2022-07-25 DIAGNOSIS — K64.4 EXTERNAL HEMORRHOIDS: Primary | ICD-10-CM

## 2022-07-25 PROCEDURE — 99213 OFFICE O/P EST LOW 20 MIN: CPT | Performed by: FAMILY MEDICINE

## 2022-07-25 RX ORDER — HYDROCORTISONE 25 MG/G
CREAM TOPICAL 2 TIMES DAILY PRN
Qty: 30 G | Refills: 0 | Status: SHIPPED | OUTPATIENT
Start: 2022-07-25 | End: 2023-07-13

## 2022-07-26 NOTE — PROGRESS NOTES
Assessment & Plan     External hemorrhoids  Given bleeding and his age he was recommended to have colonoscopy. discussed diet and topicals along with sitz bathes regularly. Use of OTC  meds. Discussed. follow up with colorectal if this is not improving quikcly this week. Discussed miralax and other means to soften stools to prevent any straining.   - hydrocortisone, Perianal, (HYDROCORTISONE) 2.5 % cream  Dispense: 30 g; Refill: 0  - Colorectal Surgery Referral       [id number]}    No follow-ups on file.    Kendell Perry MD  [facility]    ------------------------------------------------------------------------  Subjective     Mary Lopez presents to clinic today for the following health issues:  chief complaint  HPI  Has had constipation and painful hemorrhoids with some bleeding. Able to pass a large stool yesterday with some manual disimpaction yesterday but lots of pain today like a ball/spasm in the rectal area. The bleeding is less. Not using topicals nor sitz. Tried some dietary changes. Does not feel like a tear nor fever. Feels apprehensive with stoolinglike can't go due to discomfort.     Review of Systems        Objective    /82 (BP Location: Left arm, Patient Position: Sitting, Cuff Size: Adult Large)   Pulse 99   Temp 98.2  F (36.8  C) (Tympanic)   Wt 108 kg (238 lb)   SpO2 97%   BMI 34.15 kg/m    Physical Exam   GENERAL: healthy, alert and no distress  RECTAL (male): swelling perianal tissue. Appears edematous and with some hemorrhoids noted. No fissures nor fistula noted. Able to do a partial digital exam but too sore. The sphincter tone is up. No stool in vault but barely able ot get to the vault.

## 2022-07-26 NOTE — PATIENT INSTRUCTIONS
You can also use preparatin H.     Miralax one capful 1- 3 time daily.     You can also use dulcolax or senna for a short time.

## 2022-07-27 ENCOUNTER — MYC MEDICAL ADVICE (OUTPATIENT)
Dept: FAMILY MEDICINE | Facility: CLINIC | Age: 52
End: 2022-07-27

## 2022-07-28 ENCOUNTER — MYC MEDICAL ADVICE (OUTPATIENT)
Dept: FAMILY MEDICINE | Facility: CLINIC | Age: 52
End: 2022-07-28

## 2022-07-29 ENCOUNTER — E-VISIT (OUTPATIENT)
Dept: URGENT CARE | Facility: CLINIC | Age: 52
End: 2022-07-29
Payer: COMMERCIAL

## 2022-07-29 ENCOUNTER — VIRTUAL VISIT (OUTPATIENT)
Dept: FAMILY MEDICINE | Facility: CLINIC | Age: 52
End: 2022-07-29
Payer: COMMERCIAL

## 2022-07-29 DIAGNOSIS — Z78.9 ALCOHOL USE: ICD-10-CM

## 2022-07-29 DIAGNOSIS — U07.1 INFECTION DUE TO 2019 NOVEL CORONAVIRUS: Primary | ICD-10-CM

## 2022-07-29 DIAGNOSIS — I10 ESSENTIAL HYPERTENSION: ICD-10-CM

## 2022-07-29 DIAGNOSIS — Z71.89 ADVICE GIVEN ABOUT 2019-NCOV INFECTION: Primary | ICD-10-CM

## 2022-07-29 PROCEDURE — 99213 OFFICE O/P EST LOW 20 MIN: CPT | Performed by: INTERNAL MEDICINE

## 2022-07-29 PROCEDURE — 99207 PR NO CHARGE LOS: CPT | Performed by: INTERNAL MEDICINE

## 2022-07-29 NOTE — TELEPHONE ENCOUNTER
Patient scheduled.    Called and left message to check MyChart.    Sent MyChart message that patient is scheduled for telephone visit with Dr. Hoyt today at 3:00 pm.    Ora Fulton RN, [facility]

## 2022-07-29 NOTE — PROGRESS NOTES
Mary is a 51 year old who is being evaluated via a billable telephone visit.      What phone number would you like to be contacted at? [phone]  How [facility] you [facility] your [facility]? [facility]    Assessment & Plan     1.  Infection due to SARS-CoV-2 virus.  Discussed treatment options available including Paxlovid and monoclonal antibodies.  After discussion patient decided not to consider either 1 at this time.  He feels his symptoms are already improving.  His symptom onset was 5 days ago.  2.  Essential hypertension under control.      Return in about 2 weeks (around 8/12/2022) for prn.    Jimmie Hoyt MD  [facility]   Mary is a 51 year old, presenting for the following health issues:  Covid Concern      HPI       COVID-19 Symptom Review  How many days ago did these symptoms start? monday    Are any of the following symptoms significant for you?    New or worsening difficulty breathing? No    Worsening cough? No    Fever or chills? No    Headache: YES    Sore throat: YES    Chest pain: No    Diarrhea: YES    Body aches? YES    What treatments has patient tried? Constipation so took some OTC stool meds   Does patient live in a nursing home, group home, or shelter? No  Does patient have a way to get food/medications during quarantined? Yes, I have a friend or family member who can help me.      51-year-old gentleman with history ofhypertension, alcohol use started having COVID symptoms on 7/25/2022.  He has a cough, some chills and a sore throat.  He is not short of breath.  Slight headache.  The last 24 hours he feels is getting better.  He tested positive for COVID on 7/28/2022.  He wanted to know what his options are for treatment.      Review of Systems         Objective    Vitals - Patient Reported  Pain Score: No Pain (0)        Physical Exam   healthy, alert, no distress and uncooperative  PSYCH: Alert and oriented times 3; coherent speech, normal   rate and  volume, able to articulate logical thoughts, able   to abstract reason, no tangential thoughts, no hallucinations   or delusions  His affect is normal  RESP: No cough, no audible wheezing, able to talk in full sentences  Remainder of exam unable to be completed due to telephone visits                Phone call duration: 7 minutes    .  ..

## 2022-07-29 NOTE — PATIENT INSTRUCTIONS
Dear Mary Lopez,    We are sorry to hear you have covid.  To receive treatment for covid such as Paxlovid or monoclonal antibodies, an appointment is needed to assess the best course of treatment for you.  This is best done through a virtual visit, which you can schedule through my chart or by calling [phone].  If your symptoms, especially breathing, are bad and you feel you need to be seen in person, please click here to find the nearest urgent care location to you.   You will not be charged for this Visit. Thank you for trusting us with your care.    Lashae Wagner MD

## 2022-07-29 NOTE — TELEPHONE ENCOUNTER
Routing [employer] message to Dr. Hoyt  to please review and advise when able.      Ora Fulton RN, [facility]

## 2022-09-03 ENCOUNTER — LAB (OUTPATIENT)
Dept: LAB | Facility: CLINIC | Age: 52
End: 2022-09-03
Payer: COMMERCIAL

## 2022-09-03 DIAGNOSIS — R74.8 ELEVATED LIVER ENZYMES: ICD-10-CM

## 2022-09-03 PROCEDURE — 36415 COLL VENOUS BLD VENIPUNCTURE: CPT

## 2022-09-03 PROCEDURE — 80076 HEPATIC FUNCTION PANEL: CPT

## 2022-09-04 LAB
ALBUMIN SERPL-MCNC: 4 G/DL (ref 3.4–5)
ALP SERPL-CCNC: 135 U/L (ref 40–150)
ALT SERPL W P-5'-P-CCNC: 84 U/L (ref 0–70)
AST SERPL W P-5'-P-CCNC: 167 U/L (ref 0–45)
BILIRUB DIRECT SERPL-MCNC: 0.4 MG/DL (ref 0–0.2)
BILIRUB SERPL-MCNC: 1.3 MG/DL (ref 0.2–1.3)
PROT SERPL-MCNC: 9.3 G/DL (ref 6.8–8.8)

## 2022-09-16 ENCOUNTER — OFFICE VISIT (OUTPATIENT)
Dept: FAMILY MEDICINE | Facility: CLINIC | Age: 52
End: 2022-09-16
Payer: COMMERCIAL

## 2022-09-16 VITALS
OXYGEN SATURATION: 98 % | WEIGHT: 241.2 LBS | RESPIRATION RATE: 18 BRPM | TEMPERATURE: 97.7 F | BODY MASS INDEX: 35.73 KG/M2 | HEART RATE: 100 BPM | HEIGHT: 69 IN | DIASTOLIC BLOOD PRESSURE: 81 MMHG | SYSTOLIC BLOOD PRESSURE: 132 MMHG

## 2022-09-16 DIAGNOSIS — Z12.11 SCREEN FOR COLON CANCER: ICD-10-CM

## 2022-09-16 DIAGNOSIS — Z00.00 ANNUAL PHYSICAL EXAM: Primary | ICD-10-CM

## 2022-09-16 DIAGNOSIS — I10 ESSENTIAL HYPERTENSION: ICD-10-CM

## 2022-09-16 DIAGNOSIS — E66.01 MORBID OBESITY (H): ICD-10-CM

## 2022-09-16 DIAGNOSIS — M77.42 METATARSALGIA, LEFT FOOT: ICD-10-CM

## 2022-09-16 DIAGNOSIS — L29.9 ITCHING OF EAR: ICD-10-CM

## 2022-09-16 DIAGNOSIS — E78.5 MILD HYPERLIPIDEMIA: ICD-10-CM

## 2022-09-16 DIAGNOSIS — Z78.9 ALCOHOL USE: ICD-10-CM

## 2022-09-16 DIAGNOSIS — R73.01 IFG (IMPAIRED FASTING GLUCOSE): ICD-10-CM

## 2022-09-16 DIAGNOSIS — Z23 NEED FOR ZOSTER VACCINATION: ICD-10-CM

## 2022-09-16 DIAGNOSIS — Z72.0 TOBACCO ABUSE DISORDER: ICD-10-CM

## 2022-09-16 DIAGNOSIS — R74.8 ELEVATED LIVER ENZYMES: ICD-10-CM

## 2022-09-16 PROBLEM — S06.0X0A CONCUSSION WITHOUT LOSS OF CONSCIOUSNESS: Status: RESOLVED | Noted: 2020-03-11 | Resolved: 2022-09-16

## 2022-09-16 PROCEDURE — 99214 OFFICE O/P EST MOD 30 MIN: CPT | Mod: 25 | Performed by: INTERNAL MEDICINE

## 2022-09-16 PROCEDURE — 90471 IMMUNIZATION ADMIN: CPT | Performed by: INTERNAL MEDICINE

## 2022-09-16 PROCEDURE — 99396 PREV VISIT EST AGE 40-64: CPT | Mod: 25 | Performed by: INTERNAL MEDICINE

## 2022-09-16 PROCEDURE — 90750 HZV VACC RECOMBINANT IM: CPT | Performed by: INTERNAL MEDICINE

## 2022-09-16 ASSESSMENT — ENCOUNTER SYMPTOMS
SHORTNESS OF BREATH: 0
SORE THROAT: 0
DYSURIA: 0
WEAKNESS: 0
HEARTBURN: 0
FREQUENCY: 0
ABDOMINAL PAIN: 0
EYE PAIN: 0
HEADACHES: 0
DIZZINESS: 0
CHILLS: 0
MYALGIAS: 0
HEMATURIA: 0
NAUSEA: 0
JOINT SWELLING: 0
NERVOUS/ANXIOUS: 0
PARESTHESIAS: 0
DIARRHEA: 1
CONSTIPATION: 0
COUGH: 1
FEVER: 0
PALPITATIONS: 0
ARTHRALGIAS: 0
HEMATOCHEZIA: 0

## 2022-09-16 ASSESSMENT — PAIN SCALES - GENERAL: PAINLEVEL: NO PAIN (0)

## 2022-09-16 NOTE — PROGRESS NOTES
SUBJECTIVE:   CC: Mary is an 51 year old who presents for preventative health visit.     51-year-old gentleman comes in for a annual physical examination.  Also has come in for follow-up regarding hypertension and elevated liver enzyme.  Overall he is doing fine.  The last couple of years he has had itching sensation in the left ear.  He has had pain in the left foot for last couple of years as well.  In fact both feet are involved but the left more.  By the end of the day he actually limps.  He had COVID infection 2 months ago.  He had abnormal liver function study with elevated transaminases in March of this year.  After that for couple of months he curtailed his alcohol use.  Currently drinks about 3 times a week a probably a total of about 12 drinks of liquor a week.  Indicates if he wants to quit he can and has for up to 7 days.  He chews his food drink.  He also chews tobacco.  He has had weight gain in the past couple of years of more than 10 pounds.  Denies chest pain or shortness of breath.  No gastrointestinal symptoms.        Patient has been advised of split billing requirements and indicates understanding: Yes     Healthy Habits:     Getting at least 3 servings of Calcium per day:  NO    Bi-annual eye exam:  Yes    Dental care twice a year:  NO    Sleep apnea or symptoms of sleep apnea:  None    Diet:  Low salt    Frequency of exercise:  None    Taking medications regularly:  Yes    Medication side effects:  None    PHQ-2 Total Score: 0    Additional concerns today:  No      Today's PHQ-2 Score:   PHQ-2 ( 1999 Pfizer) 9/16/2022   Q1: Little interest or pleasure in doing things 0   Q2: Feeling down, depressed or hopeless 0   PHQ-2 Score 0   PHQ-2 Total Score (12-17 Years)- Positive if 3 or more points; Administer PHQ-A if positive -   Q1: Little interest or pleasure in doing things Not at all   Q2: Feeling down, depressed or hopeless Not at all   PHQ-2 Score 0       Abuse: Current or Past(Physical,  Sexual or Emotional)- No  Do you feel safe in your environment? Yes    Have you ever done Advance Care Planning? (For example, a Health Directive, POLST, or a discussion with a medical provider or your loved ones about your wishes): No, advance care planning information given to patient to review.  Patient declined advance care planning discussion at this time.    Social History     Tobacco Use     Smoking status: Never Smoker     Smokeless tobacco: Current User     Types: Chew   Substance Use Topics     Alcohol use: Yes     Alcohol/week: 12.0 standard drinks     Types: 12 Standard drinks or equivalent per week     Comment: 12 drinsk per week     If you drink alcohol do you typically have >3 drinks per day or >7 drinks per week? Yes      Alcohol Use 9/16/2022   Prescreen: >3 drinks/day or >7 drinks/week? -   AUDIT SCORE  9     AUDIT - Alcohol Use Disorders Identification Test - Reproduced from the World Health Organization Audit 2001 (Second Edition) 9/16/2022   1.  How often do you have a drink containing alcohol? 4 or more times a week   2.  How many drinks containing alcohol do you have on a typical day when you are drinking? 1 or 2   3.  How often do you have five or more drinks on one occasion? Less than monthly   4.  How often during the last year have you found that you were not able to stop drinking once you had started? Never   5.  How often during the last year have you failed to do what was normally expected of you because of drinking? Never   6.  How often during the last year have you needed a first drink in the morning to get yourself going after a heavy drinking session? Never   7.  How often during the last year have you had a feeling of guilt or remorse after drinking? Never   8.  How often during the last year have you been unable to remember what happened the night before because of your drinking? Never   9.  Have you or someone else been injured because of your drinking? No   10. Has a relative,  friend, doctor or other health care worker been concerned about your drinking or suggested you cut down? Yes, during the last year   TOTAL SCORE 9       Last PSA:   Prostate Specific Antigen Screen   Date Value Ref Range Status   03/12/2022 1.41 0.00 - 4.00 ug/L Final       Reviewed orders with patient. Reviewed health maintenance and updated orders accordingly - Yes  BP Readings from Last 3 Encounters:   09/16/22 132/81   07/25/22 134/82   12/07/20 (!) 142/83    Wt Readings from Last 3 Encounters:   09/16/22 109.4 kg (241 lb 3.2 oz)   07/25/22 108 kg (238 lb)   03/11/20 104.2 kg (229 lb 11.2 oz)                  Patient Active Problem List   Diagnosis     Essential hypertension     Tobacco abuse disorder     Alcohol use     IFG (impaired fasting glucose)     Mild hyperlipidemia     Elevated liver enzymes     Morbid obesity (H)     Past Surgical History:   Procedure Laterality Date     CHOLECYSTECTOMY  unknown    done at [facility]       Social History     Tobacco Use     Smoking status: Never Smoker     Smokeless tobacco: Current User     Types: Chew   Substance Use Topics     Alcohol use: Yes     Alcohol/week: 12.0 standard drinks     Types: 12 Standard drinks or equivalent per week     Comment: 12 drinsk per week     Family History   Problem Relation Age of Onset     Chronic Obstructive Pulmonary Disease Mother      Lung Cancer Mother 81     Morbid Obesity Father      Diabetes Type 2  Brother          Current Outpatient Medications   Medication Sig Dispense Refill     amLODIPine (NORVASC) 10 MG tablet TAKE 1 TABLET(10 MG) BY MOUTH DAILY 60 tablet 0     hydrocortisone, Perianal, (HYDROCORTISONE) 2.5 % cream Place rectally 2 times daily as needed for hemorrhoids 30 g 0     lisinopril (ZESTRIL) 40 MG tablet TAKE 1 TABLET(40 MG) BY MOUTH DAILY 60 tablet 0     Allergies   Allergen Reactions     Fish Swelling     Food      baked beans       Reviewed and updated as needed this visit by clinical staff   Tobacco   Allergies  Meds   Med Hx  Surg Hx  Fam Hx  Soc Hx          Reviewed and updated as needed this visit by Provider                   Past Medical History:   Diagnosis Date     Alcohol use 03/11/2020     Concussion without loss of consciousness 03/11/2020     Elevated liver enzymes 5/3/2022     Essential hypertension 03/11/2020     IFG (impaired fasting glucose) 12/07/2021     Mild hyperlipidemia 12/07/2021     Tobacco abuse disorder 03/11/2020      Past Surgical History:   Procedure Laterality Date     CHOLECYSTECTOMY  unknown    done at [facility]       Review of Systems   Constitutional: Negative for chills and fever.   HENT: Negative for congestion, ear pain, hearing loss and sore throat.    Eyes: Negative for pain and visual disturbance.   Respiratory: Positive for cough. Negative for shortness of breath.    Cardiovascular: Negative for chest pain, palpitations and peripheral edema.   Gastrointestinal: Positive for diarrhea. Negative for abdominal pain, constipation, heartburn, hematochezia and nausea.   Genitourinary: Positive for impotence. Negative for dysuria, frequency, genital sores, hematuria, penile discharge and urgency.   Musculoskeletal: Negative for arthralgias, joint swelling and myalgias.   Skin: Negative for rash.   Neurological: Negative for dizziness, weakness, headaches and paresthesias.   Psychiatric/Behavioral: Negative for mood changes. The patient is not nervous/anxious.      CONSTITUTIONAL: NEGATIVE for fever, chills, change in weight  INTEGUMENTARY/SKIN: NEGATIVE for worrisome rashes, moles or lesions  EYES: NEGATIVE for vision changes or irritation  ENT: NEGATIVE for ear, mouth and throat problems  RESP: NEGATIVE for significant cough or SOB  CV: NEGATIVE for chest pain, palpitations or peripheral edema  GI: NEGATIVE for nausea, abdominal pain, heartburn, or change in bowel habits   male: negative for dysuria, hematuria, decreased urinary stream, erectile dysfunction, urethral  "discharge  MUSCULOSKELETAL: NEGATIVE for significant arthralgias or myalgia  NEURO: NEGATIVE for weakness, dizziness or paresthesias  ENDOCRINE: NEGATIVE for temperature intolerance, skin/hair changes  HEME/ALLERGY/IMMUNE: NEGATIVE for bleeding problems  PSYCHIATRIC: NEGATIVE for changes in mood or affect    OBJECTIVE:   /81   Pulse 100   Temp 97.7  F (36.5  C) (Tympanic)   Resp 18   Ht 1.753 m (5' 9\")   Wt 109.4 kg (241 lb 3.2 oz)   SpO2 98%   BMI 35.62 kg/m      Physical Exam  GENERAL: healthy, alert and no distress  EYES: Eyes grossly normal to inspection, PERRL and conjunctivae and sclerae normal  HENT: ear canals and TM's normal, nose and mouth without ulcers or lesions  NECK: no adenopathy, no asymmetry, masses, or scars and thyroid normal to palpation  RESP: lungs clear to auscultation - no rales, rhonchi or wheezes  CV: regular rate and rhythm, normal S1 S2, no S3 or S4, no murmur, click or rub, no peripheral edema and peripheral pulses strong  ABDOMEN: soft, nontender, bowel sounds normal. LIVER palpable 3 to 4 finger below right costal margin   (male): normal male genitalia without lesions or urethral discharge, no hernia  MS: no gross musculoskeletal defects noted, no edema. Tenderness of the TMT joints of left foot.  SKIN: no suspicious lesions or rashes  NEURO: Normal strength and tone, mentation intact and speech normal  PSYCH: mentation appears normal, affect normal/bright  LYMPH: no cervical, supraclavicular, axillary, or inguinal adenopathy    Diagnostic Test Results:  Labs reviewed in Epic     Latest Reference Range & Units 09/03/22 10:06   Albumin 3.4 - 5.0 g/dL 4.0   Protein Total 6.8 - 8.8 g/dL 9.3 (H)   Alkaline Phosphatase 40 - 150 U/L 135   ALT 0 - 70 U/L 84 (H)   AST 0 - 45 U/L 167 (H)   Bilirubin Direct 0.0 - 0.2 mg/dL 0.4 (H)   Bilirubin Total 0.2 - 1.3 mg/dL 1.3   (H): Data is abnormally high     Latest Reference Range & Units 09/03/22 10:06   Albumin 3.4 - 5.0 g/dL 4.0 "   Protein Total 6.8 - 8.8 g/dL 9.3 (H)   Alkaline Phosphatase 40 - 150 U/L 135   ALT 0 - 70 U/L 84 (H)   AST 0 - 45 U/L 167 (H)   Bilirubin Direct 0.0 - 0.2 mg/dL 0.4 (H)   Bilirubin Total 0.2 - 1.3 mg/dL 1.3   (H): Data is abnormally high        ASSESSMENT/PLAN:     1.  Annual physical exam completed.  2.  Essential hypertension with blood pressure under control with amlodipine 10 mg a day and lisinopril 40 mg a day.  He will continue same.  His electrolytes were good when checked in March.  3.  Impaired fasting glucose.  Had a blood sugar of 135 and hemoglobin A1c of 5.7 on 3/12/2022.  Weight reduction is important.  4.  Mild hyperlipidemia with cholesterol 203, HDL 71  measured on 3/12/2022.  5.  Itching of the left ear.  Slight dry flaky external auditory canal.  No other lesions seen.  Advised to use some over-the-counter hydrocortisone cream for few weeks see if it helps.  6.  Metatarsalgia particularly left foot.  Refer to podiatry.  7.  Elevated liver enzyme felt to be related to alcohol use.  On clinical exam today the liver span is large suggestive of hepatomegaly.  Ultrasound of the liver recommended and ordered.  I will get back to him with recommendation and plans for follow-up.  8.  Alcohol use disorder.  Ask if he would consider referral for alcohol dependency treatment.  Patient declined he feels that he can quit without help.  He chooses to drink.  9.  Tobacco abuse disorder in form of chewing tobacco.  Counseled.  10.  First dose of zoster vaccine i.e. Shingrix provided.  Second dose to be given after 2 months.  11.  Discussed influenza vaccine and COVID booster vaccine which she declined.  12.  Screening for colorectal cancer discussed.  Both colonoscopy and Cologuard discussed.  Patient is going to think about it.  13.  Obesity class II with a BMI of 35.6.  Weight reduction discussed.      Patient has been advised of split billing requirements and indicates understanding:  "Yes    COUNSELING:   Reviewed preventive health counseling, as reflected in patient instructions       Regular exercise       Healthy diet/nutrition       Vision screening    Estimated body mass index is 35.62 kg/m  as calculated from the following:    Height as of this encounter: 1.753 m (5' 9\").    Weight as of this encounter: 109.4 kg (241 lb 3.2 oz).     Weight management plan: Discussed healthy diet and exercise guidelines    He reports that he has never smoked. His smokeless tobacco use includes chew.      Counseling Resources:  ATP IV Guidelines  Pooled Cohorts Equation Calculator  FRAX Risk Assessment  ICSI Preventive Guidelines  Dietary Guidelines for Americans, 2010  USDA's MyPlate  ASA Prophylaxis  Lung CA Screening    Jimmie Hoyt MD  [facility]  "

## 2022-10-04 ENCOUNTER — ANCILLARY PROCEDURE (OUTPATIENT)
Dept: ULTRASOUND IMAGING | Facility: CLINIC | Age: 52
End: 2022-10-04
Attending: INTERNAL MEDICINE
Payer: COMMERCIAL

## 2022-10-04 DIAGNOSIS — R74.8 ELEVATED LIVER ENZYMES: ICD-10-CM

## 2022-10-04 DIAGNOSIS — E66.01 MORBID OBESITY (H): ICD-10-CM

## 2022-10-04 DIAGNOSIS — Z78.9 ALCOHOL USE: Primary | ICD-10-CM

## 2022-10-04 PROCEDURE — 76700 US EXAM ABDOM COMPLETE: CPT | Performed by: RADIOLOGY

## 2022-12-05 ENCOUNTER — OFFICE VISIT (OUTPATIENT)
Dept: URGENT CARE | Facility: URGENT CARE | Age: 52
End: 2022-12-05
Payer: COMMERCIAL

## 2022-12-05 VITALS
BODY MASS INDEX: 34.44 KG/M2 | WEIGHT: 233.2 LBS | TEMPERATURE: 98.3 F | SYSTOLIC BLOOD PRESSURE: 175 MMHG | HEART RATE: 102 BPM | OXYGEN SATURATION: 96 % | DIASTOLIC BLOOD PRESSURE: 94 MMHG

## 2022-12-05 DIAGNOSIS — H61.21 IMPACTED CERUMEN OF RIGHT EAR: Primary | ICD-10-CM

## 2022-12-05 PROCEDURE — 99213 OFFICE O/P EST LOW 20 MIN: CPT | Performed by: FAMILY MEDICINE

## 2022-12-05 NOTE — PROGRESS NOTES
Assessment & Plan     Impacted cerumen of right ear  Ear wax removed with wash  Clear on recheck             No follow-ups on file.    Filemon Barrett MD  [facility] MEI Hernandez is a 52 year old male who presents to clinic today for the following health issues:  Chief Complaint   Patient presents with     Urgent Care     Otalgia     Right ear pain      HPI    Used Q tip in ear and shoved wax into canal  Painful.  Started yesterday.        Review of Systems        Objective    BP (!) 175/94 (BP Location: Left arm, Patient Position: Sitting, Cuff Size: Adult Large)   Pulse 102   Temp 98.3  F (36.8  C) (Tympanic)   Wt 105.8 kg (233 lb 3.2 oz)   SpO2 96%   BMI 34.44 kg/m    Physical Exam  Vitals and nursing note reviewed.   Constitutional:       Appearance: Normal appearance.   HENT:      Right Ear: There is impacted cerumen.      Left Ear: There is no impacted cerumen.   Neurological:      Mental Status: He is alert.

## 2023-06-25 ENCOUNTER — OFFICE VISIT (OUTPATIENT)
Dept: URGENT CARE | Facility: URGENT CARE | Age: 53
End: 2023-06-25
Payer: COMMERCIAL

## 2023-06-25 VITALS
SYSTOLIC BLOOD PRESSURE: 114 MMHG | BODY MASS INDEX: 33.67 KG/M2 | HEART RATE: 99 BPM | TEMPERATURE: 97.7 F | OXYGEN SATURATION: 98 % | RESPIRATION RATE: 14 BRPM | DIASTOLIC BLOOD PRESSURE: 71 MMHG | WEIGHT: 228 LBS

## 2023-06-25 DIAGNOSIS — E66.01 MORBID OBESITY (H): ICD-10-CM

## 2023-06-25 DIAGNOSIS — L73.2 HIDRADENITIS SUPPURATIVA: Primary | ICD-10-CM

## 2023-06-25 PROCEDURE — 99213 OFFICE O/P EST LOW 20 MIN: CPT | Performed by: STUDENT IN AN ORGANIZED HEALTH CARE EDUCATION/TRAINING PROGRAM

## 2023-06-25 RX ORDER — DOXYCYCLINE HYCLATE 100 MG
100 TABLET ORAL 2 TIMES DAILY
Qty: 42 TABLET | Refills: 0 | Status: SHIPPED | OUTPATIENT
Start: 2023-06-25 | End: 2023-07-13

## 2023-06-25 NOTE — LETTER
June 25, 2023      Mary Lopez  [address] [id number]        To Whom It May Concern:    Mary Lopez  was seen on 6/25/23.  Please excuse him  until Wednesdya 6/28/23 due to illness.        Sincerely,        Benedicto Carey MD

## 2023-06-26 NOTE — PROGRESS NOTES
ASSESSMENT/PLAN:  (L73.2) Hidradenitis suppurativa  (primary encounter diagnosis)  (E66.01) Morbid obesity (H)  Comment: History very consistent with HS though patient denies ever being truly diagnosed; in setting of morbid obesity.  More acute nodular region in left inguinal area does not appear to be true abscess and appears to be more consistent with HS she has had several times at this same location and resolve spontaneously.  Also consistent with other areas that certainly look like HS with double headed comedones.  Discussed typical clinical course of hidradenitis suppurativa and patient agrees that this sounds very consistent with his history.  Discussed options for starting doxycycline to see if we can reduce the current inflammation and then follow-up with Derm for further evaluation and potential management.  Plan:   -doxycycline hyclate (VIBRA-TABS) 100 MG tablet BID  -Adult Dermatology Referral  -Counseled extensively on follow-up precautions such as if develops fevers or chills or has any extension of pain or  develops redness        Appropriate PPE worn.    Options for treatment and follow-up care were reviewed with the patient and/or guardian. Mary Lopez and/or guardian engaged in the decision making process and verbalized understanding of the options discussed and agreed with the final plan.    See [facility] for orders, medications, letters, patient instructions  This note was dictated with Raul sifuentes.      Benedicto Carey MD    SUBJECTIVE:  Mary Lopez is an 52 year old male who presents for the below.    Pain from boil-like lesion over left inguinal region, came on insidiously over the last several days.  Gets these chronically and they resolve spontaneously.  Feel consistent with once he has had prior consistently.  Also gets them in the armpits and had one surgically removed from his chest.  Does not know what they are from.  No fevers or chills and says otherwise feels fine  just that the region hurts.  No issues with urination.  Has not noticed any redness over the area of the pain.    PMH:   has a past medical history of Alcohol use (03/11/2020), Concussion without loss of consciousness (03/11/2020), Elevated liver enzymes (5/3/2022), Essential hypertension (03/11/2020), IFG (impaired fasting glucose) (12/07/2021), Mild hyperlipidemia (12/07/2021), and Tobacco abuse disorder (03/11/2020).  Patient Active Problem List   Diagnosis     Essential hypertension     Tobacco abuse disorder     Alcohol use     IFG (impaired fasting glucose)     Mild hyperlipidemia     Elevated liver enzymes     Morbid obesity (H)     Social History     Socioeconomic History     Marital status:      Spouse name: None     Number of children: None     Years of education: None     Highest education level: None   Tobacco Use     Smoking status: Never     Smokeless tobacco: Current     Types: Chew   Vaping Use     Vaping Use: Never used   Substance and Sexual Activity     Alcohol use: Yes     Alcohol/week: 12.0 standard drinks of alcohol     Types: 12 Standard drinks or equivalent per week     Comment: 12 drinsk per week     Drug use: Not Currently     Sexual activity: Yes     Partners: Female     Birth control/protection: Male Surgical   Other Topics Concern     Parent/sibling w/ CABG, MI or angioplasty before 65F 55M? No     Family History   Problem Relation Age of Onset     Chronic Obstructive Pulmonary Disease Mother      Lung Cancer Mother 81     Morbid Obesity Father      Diabetes Type 2  Brother        ALLERGIES:  Fish and Food    Current Outpatient Medications   Medication     doxycycline hyclate (VIBRA-TABS) 100 MG tablet     lisinopril (ZESTRIL) 40 MG tablet     amLODIPine (NORVASC) 10 MG tablet     hydrocortisone, Perianal, (HYDROCORTISONE) 2.5 % cream     No current facility-administered medications for this visit.         ROS:  ROS is done and is negative for general/constitutional, eye, ENT,  Respiratory, cardiovascular, GI, , Skin, musculoskeletal except as noted elsewhere.  All other review of systems negative except as noted elsewhere.    OBJECTIVE:  /71   Pulse 99   Temp 97.7  F (36.5  C) (Tympanic)   Resp 14   Wt 103.4 kg (228 lb)   SpO2 98%   BMI 33.67 kg/m    General: Sitting comfortably. No acute distress.   HEENT: Conjunctivae are clear without discharge or erythema.   Neck: No masses. Normal ROM.  Respiratory: No respiratory distress.   Cardiac: Warm and well perfused. Brisk cap refill.  Abdominal: Abdomen is soft and non-tender.  : Tender nodular area over left inguinal region without overlying redness or swelling.  Scrotum otherwise unremarkable.  No lymphadenopathy appreciated in inguinal region.  Extremities: Upper and lower extremities grossly normal.  Skin: See .  Doubled out appearance throughout in left axillary and with other hard nodular areas  And tracks.  Neurological: Motor function is grossly normal.  Normal mentation.  Psychiatric: Good insight and judgement.      RESULTS  No results found for any visits on 06/25/23.  No results found for this or any previous visit (from the past 48 hour(s)).

## 2023-06-28 ENCOUNTER — MYC MEDICAL ADVICE (OUTPATIENT)
Dept: FAMILY MEDICINE | Facility: CLINIC | Age: 53
End: 2023-06-28
Payer: COMMERCIAL

## 2023-06-28 DIAGNOSIS — K70.30 ALCOHOLIC CIRRHOSIS OF LIVER WITHOUT ASCITES (H): Primary | ICD-10-CM

## 2023-07-04 NOTE — TELEPHONE ENCOUNTER
DIAGNOSIS: Please text Link to [phone] and [phone] for Pt's Spouse Adina Alcoholic cirrhosis of liver without ascites, Recs at [facility], Labs to be Completed, Per Pt's Spouse   Appt Date: 8.23.23   NOTES STATUS DETAILS   OFFICE NOTE from referring provider Internal 6.28.23, 9.16.22, 5.3.22 + more with  Lai POSEY   OFFICE NOTES from other specialists     DISCHARGE SUMMARY from hospital CE 6.26-6.29.23  [facility]   MEDICATION LIST Internal    LIVER BIOSPY (IF APPLICABLE)      PATHOLOGY REPORTS      IMAGING     ENDOSCOPY (IF AVAILABLE)     COLONOSCOPY (IF AVAILABLE)     ULTRASOUND LIVER Internal 10.4.22   CT OF ABDOMEN CE 6.26.23   MRI OF LIVER     FIBROSCAN, US ELASTOGRAPHY, FIBROSIS SCAN, MR ELASTOGRAPHY     LABS     HEPATIC PANEL (LIVER PANEL) CE/Internal 6.29.23, 6.28.23, 6.27.23   BASIC METABOLIC PANEL CE 6.28.23   COMPLETE METABOLIC PANEL Internal 3.12.22   COMPLETE BLOOD COUNT (CBC) CE 6.28.23, 6.27.23, 6.26.23   INTERNATIONAL NORMALIZED RATIO (INR) CE 10.1.22   HEPATITIS C ANTIBODY CE 6.27.23   HEPATITIS C VIRAL LOAD/PCR     HEPATITIS C GENOTYPE     HEPATITIS B SURFACE ANTIGEN     HEPATITIS B SURFACE ANTIBODY     HEPATITIS B DNA QUANT LEVEL     HEPATITIS B CORE ANTIBODY

## 2023-07-12 ENCOUNTER — DOCUMENTATION ONLY (OUTPATIENT)
Dept: LAB | Facility: CLINIC | Age: 53
End: 2023-07-12
Payer: COMMERCIAL

## 2023-07-12 DIAGNOSIS — K70.10 ALCOHOLIC HEPATITIS WITHOUT ASCITES (H): ICD-10-CM

## 2023-07-12 DIAGNOSIS — K76.6 PORTAL HYPERTENSION (H): ICD-10-CM

## 2023-07-12 DIAGNOSIS — R16.1 SPLENOMEGALY: ICD-10-CM

## 2023-07-12 DIAGNOSIS — D64.89 ANEMIA DUE TO OTHER CAUSE, NOT CLASSIFIED: ICD-10-CM

## 2023-07-12 DIAGNOSIS — D69.6 THROMBOCYTOPENIA (H): ICD-10-CM

## 2023-07-12 DIAGNOSIS — I85.10 SECONDARY ESOPHAGEAL VARICES WITHOUT BLEEDING (H): ICD-10-CM

## 2023-07-12 DIAGNOSIS — K70.30 ALCOHOLIC CIRRHOSIS OF LIVER WITHOUT ASCITES (H): ICD-10-CM

## 2023-07-12 NOTE — PROGRESS NOTES
Mary Lopez has an upcoming lab appointment: 7/15/23    Future Appointments   Date Time Provider Department Center   7/13/2023  5:30 PM Jimmie Hoyt MD [facility]   7/15/2023 10:30 AM [facility]   8/23/2023  2:00 PM Sher Dutton [facility]   9/14/2023  4:30 PM Jimmie Hoyt MD [facility]   11/29/2023  2:30 PM Jak Truong MD [facility]     Patient is scheduled for the following lab(s): none    There is no order available. Please review and place either future orders or HMPO (Review of Health Maintenance Protocol Orders), as appropriate.    There are no preventive care reminders to display for this patient.  Ora Gardiner

## 2023-07-13 ENCOUNTER — VIRTUAL VISIT (OUTPATIENT)
Dept: FAMILY MEDICINE | Facility: CLINIC | Age: 53
End: 2023-07-13
Payer: COMMERCIAL

## 2023-07-13 DIAGNOSIS — I10 ESSENTIAL HYPERTENSION: ICD-10-CM

## 2023-07-13 DIAGNOSIS — Z78.9 ALCOHOL USE: ICD-10-CM

## 2023-07-13 DIAGNOSIS — N17.9 AKI (ACUTE KIDNEY INJURY) (H): ICD-10-CM

## 2023-07-13 DIAGNOSIS — N49.2 CELLULITIS OF SCROTUM: Primary | ICD-10-CM

## 2023-07-13 DIAGNOSIS — D64.9 ANEMIA DUE TO UNKNOWN MECHANISM: ICD-10-CM

## 2023-07-13 DIAGNOSIS — K70.10 ALCOHOLIC HEPATITIS WITHOUT ASCITES (H): ICD-10-CM

## 2023-07-13 DIAGNOSIS — K70.30 ALCOHOLIC CIRRHOSIS OF LIVER WITHOUT ASCITES (H): ICD-10-CM

## 2023-07-13 DIAGNOSIS — K76.6 PORTAL HYPERTENSION (H): ICD-10-CM

## 2023-07-13 DIAGNOSIS — G47.00 PERSISTENT INSOMNIA: ICD-10-CM

## 2023-07-13 DIAGNOSIS — Z72.0 TOBACCO ABUSE DISORDER: ICD-10-CM

## 2023-07-13 DIAGNOSIS — I85.10 SECONDARY ESOPHAGEAL VARICES WITHOUT BLEEDING (H): ICD-10-CM

## 2023-07-13 PROBLEM — R16.1 SPLENOMEGALY: Status: ACTIVE | Noted: 2023-07-13

## 2023-07-13 PROBLEM — D64.89 OTHER SPECIFIED ANEMIAS: Status: ACTIVE | Noted: 2023-07-13

## 2023-07-13 PROBLEM — D64.89 OTHER SPECIFIED ANEMIAS: Status: RESOLVED | Noted: 2023-07-13 | Resolved: 2023-07-13

## 2023-07-13 PROBLEM — D69.6 THROMBOCYTOPENIA (H): Status: ACTIVE | Noted: 2023-07-13

## 2023-07-13 PROCEDURE — 99214 OFFICE O/P EST MOD 30 MIN: CPT | Mod: VID | Performed by: INTERNAL MEDICINE

## 2023-07-13 RX ORDER — MULTIPLE VITAMINS W/ MINERALS TAB 9MG-400MCG
1 TAB ORAL DAILY
Qty: 90 TABLET | Refills: 1 | Status: SHIPPED | OUTPATIENT
Start: 2023-07-13 | End: 2024-02-02

## 2023-07-13 RX ORDER — LACTULOSE 10 G/15ML
20 SOLUTION ORAL 2 TIMES DAILY
Qty: 473 ML | Refills: 1 | Status: SHIPPED | OUTPATIENT
Start: 2023-07-13 | End: 2023-07-25

## 2023-07-13 RX ORDER — DOXEPIN HYDROCHLORIDE 10 MG/1
10 CAPSULE ORAL AT BEDTIME
Qty: 60 CAPSULE | Refills: 1 | Status: SHIPPED | OUTPATIENT
Start: 2023-07-13 | End: 2023-11-07

## 2023-07-13 NOTE — PROGRESS NOTES
Mary is a 52 year old who is being evaluated via a billable video visit.      How would you like to obtain your AVS? MyChart  If the video visit is dropped, the invitation should be resent by: Text to cell phone: [phone]  Will anyone else be joining your video visit? No        Assessment & Plan     1.  Cellulitis of the scrotum.  Seems like its resolved and he is done with the antibiotic.  2.  Alcohol cirrhosis of the liver without ascites confirmed by CT scan during recent hospitalization.  Patient discharged on lactulose.  3.  Alcohol hepatitis without ascites.  Predischarge bilirubin and ALT were still high.  I will be checking lab to follow-up.  Patient has an upcoming appointment in Department of hepatology.  4.  Portal hypertension secondary to #2.  5.  Secondary esophageal varices based on CT scan.  Bleeding status unknown.  Will refer to gastroenterology for upper endoscopy.  6.  Alcohol use disorder.  Patient claims that he has not touched alcohol since hospitalization 6/24/2023.  7.  Anemia with predischarge hemoglobin around 8.9.  Will reassess.  Could be multiple factors including GI bleeding, cirrhosis, bone marrow suppression from alcoholism.  8.  Thrombocytopenia probably related to alcoholism and hypersplenism.  Patient does have splenomegaly.  9.  ANGEL during recent hospitalization improved before discharge.  We will be checking renal function.  10.  Persistent insomnia of longstanding.  Discharged home on doxepin 10 mg a day and it seems to be helping.  Patient wanted to consider increasing the dose but at this point I think he should just stay on this dose for now.  11.  Essential hypertension previously treated with lisinopril and amlodipine both of which were discontinued during this recent hospitalization.  Blood pressure at discharge was normal.  12.  Tobacco use disorder      We will check CBC with differential, CMP, INR, iron study and fit test       MED REC REQUIRED  Post Medication  "Reconciliation Status:   BMI:   Estimated body mass index is 33.67 kg/m  as calculated from the following:    Height as of 9/16/22: 1.753 m (5' 9\").    Weight as of 6/25/23: 103.4 kg (228 lb).           Jimmie Hoyt MD  [facility] MAYA Hernandez is a 52 year old, presenting for the following health issues:  Hospital F/U        7/13/2023     9:21 AM   Additional Questions   Roomed by Neris     [facility] Follow-up Visit:    Hospital/Nursing Home/IP Rehab Facility: [facility]   Date of Admission: 6/26/23  Date of Discharge: 6/30/23  Reason(s) for Admission: Decompensated hepatic cirrhosis     Was your hospitalization related to COVID-19? No   Problems taking medications regularly:  None  Medication changes since discharge:   Problems adhering to non-medication therapy:  None    Summary of hospitalization:  CareEverywhere information obtained and reviewed  Diagnostic Tests/Treatments reviewed.  Follow up needed: yes  Other Healthcare Providers Involved in Patient s Care:         None  Update since discharge: improved.   Plan of care communicated with patient     52-year-old gentleman with known history of hypertension, tobacco use disorder and alcoholism was hospitalized on 6/24/2023 with scrotal cellulitis and found to have cirrhosis of liver with portal hypertension, esophageal varices, splenomegaly, anemia, thrombocytopenia and ANGEL.  He had alcoholic hepatitis with high transaminases and bilirubin.  No active bleeding was noted but his hemoglobin was low.  Patient feels tired but otherwise better.  He indicates he has not had alcohol since he was discharged from the hospital.  He has a issue with insomnia of longstanding and the doxepin 10 mg a day prescribed at that time of discharge is working well.  He wondered if the dose could be increased to 20 mg.    Review of Systems   Constitutional, HEENT, cardiovascular, pulmonary, GI, , musculoskeletal, neuro, skin, " endocrine and psych systems are negative, except as otherwise noted.      Objective           Vitals:  No vitals were obtained today due to virtual visit.    Physical Exam   GENERAL: Healthy, alert and no distress  EYES: Eyes grossly normal to inspection.  No discharge or erythema, or obvious scleral/conjunctival abnormalities.  RESP: No audible wheeze, cough, or visible cyanosis.  No visible retractions or increased work of breathing.    SKIN: Visible skin clear. No significant rash, abnormal pigmentation or lesions.  NEURO: Cranial nerves grossly intact.  Mentation and speech appropriate for age.  PSYCH: Mentation appears normal, affect normal/bright, judgement and insight intact, normal speech and appearance well-groomed.                Video-Visit Details    Type of service:  Video Visit     Originating Location (pt. Location): Home    Distant Location (provider location):  On-site  Platform used for Video Visit: [employer]

## 2023-07-15 ENCOUNTER — LAB (OUTPATIENT)
Dept: LAB | Facility: CLINIC | Age: 53
End: 2023-07-15
Payer: COMMERCIAL

## 2023-07-15 ENCOUNTER — MYC MEDICAL ADVICE (OUTPATIENT)
Dept: FAMILY MEDICINE | Facility: CLINIC | Age: 53
End: 2023-07-15

## 2023-07-15 DIAGNOSIS — K76.6 PORTAL HYPERTENSION (H): ICD-10-CM

## 2023-07-15 DIAGNOSIS — K70.30 ALCOHOLIC CIRRHOSIS OF LIVER WITHOUT ASCITES (H): ICD-10-CM

## 2023-07-15 DIAGNOSIS — K70.10 ALCOHOLIC HEPATITIS WITHOUT ASCITES (H): ICD-10-CM

## 2023-07-15 DIAGNOSIS — N49.2 CELLULITIS OF SCROTUM: ICD-10-CM

## 2023-07-15 DIAGNOSIS — D64.9 ANEMIA DUE TO UNKNOWN MECHANISM: ICD-10-CM

## 2023-07-15 DIAGNOSIS — I10 ESSENTIAL HYPERTENSION: Primary | ICD-10-CM

## 2023-07-15 DIAGNOSIS — D64.89 ANEMIA DUE TO OTHER CAUSE, NOT CLASSIFIED: ICD-10-CM

## 2023-07-15 DIAGNOSIS — D69.6 THROMBOCYTOPENIA (H): ICD-10-CM

## 2023-07-15 LAB
ALBUMIN SERPL BCG-MCNC: 2.5 G/DL (ref 3.5–5.2)
ALP SERPL-CCNC: 205 U/L (ref 40–129)
ALT SERPL W P-5'-P-CCNC: 32 U/L (ref 0–70)
ANION GAP SERPL CALCULATED.3IONS-SCNC: 6 MMOL/L (ref 7–15)
AST SERPL W P-5'-P-CCNC: 94 U/L (ref 0–45)
BASOPHILS # BLD AUTO: 0.1 10E3/UL (ref 0–0.2)
BASOPHILS NFR BLD AUTO: 1 %
BILIRUB SERPL-MCNC: 7.3 MG/DL
BUN SERPL-MCNC: 17.2 MG/DL (ref 6–20)
CALCIUM SERPL-MCNC: 8.3 MG/DL (ref 8.6–10)
CHLORIDE SERPL-SCNC: 107 MMOL/L (ref 98–107)
CREAT SERPL-MCNC: 0.75 MG/DL (ref 0.67–1.17)
DEPRECATED HCO3 PLAS-SCNC: 21 MMOL/L (ref 22–29)
EOSINOPHIL # BLD AUTO: 1.2 10E3/UL (ref 0–0.7)
EOSINOPHIL NFR BLD AUTO: 9 %
ERYTHROCYTE [DISTWIDTH] IN BLOOD BY AUTOMATED COUNT: 14.2 % (ref 10–15)
GFR SERPL CREATININE-BSD FRML MDRD: >90 ML/MIN/1.73M2
GLUCOSE SERPL-MCNC: 95 MG/DL (ref 70–99)
HCT VFR BLD AUTO: 24 % (ref 40–53)
HGB BLD-MCNC: 8 G/DL (ref 13.3–17.7)
IMM GRANULOCYTES # BLD: 0.4 10E3/UL
IMM GRANULOCYTES NFR BLD: 3 %
IRON BINDING CAPACITY (ROCHE): NORMAL
IRON SATN MFR SERPL: NORMAL %
IRON SERPL-MCNC: 92 UG/DL (ref 61–157)
LYMPHOCYTES # BLD AUTO: 1.5 10E3/UL (ref 0.8–5.3)
LYMPHOCYTES NFR BLD AUTO: 11 %
MCH RBC QN AUTO: 36.9 PG (ref 26.5–33)
MCHC RBC AUTO-ENTMCNC: 33.3 G/DL (ref 31.5–36.5)
MCV RBC AUTO: 111 FL (ref 78–100)
MONOCYTES # BLD AUTO: 1.3 10E3/UL (ref 0–1.3)
MONOCYTES NFR BLD AUTO: 10 %
NEUTROPHILS # BLD AUTO: 9.3 10E3/UL (ref 1.6–8.3)
NEUTROPHILS NFR BLD AUTO: 67 %
PLATELET # BLD AUTO: 125 10E3/UL (ref 150–450)
POTASSIUM SERPL-SCNC: 5.1 MMOL/L (ref 3.4–5.3)
PROT SERPL-MCNC: 7.7 G/DL (ref 6.4–8.3)
RBC # BLD AUTO: 2.17 10E6/UL (ref 4.4–5.9)
SODIUM SERPL-SCNC: 134 MMOL/L (ref 136–145)
VIT B12 SERPL-MCNC: 2662 PG/ML (ref 232–1245)
WBC # BLD AUTO: 13.9 10E3/UL (ref 4–11)

## 2023-07-15 PROCEDURE — 85025 COMPLETE CBC W/AUTO DIFF WBC: CPT

## 2023-07-15 PROCEDURE — 80053 COMPREHEN METABOLIC PANEL: CPT

## 2023-07-15 PROCEDURE — 36415 COLL VENOUS BLD VENIPUNCTURE: CPT

## 2023-07-15 PROCEDURE — 82607 VITAMIN B-12: CPT

## 2023-07-15 PROCEDURE — 85610 PROTHROMBIN TIME: CPT

## 2023-07-15 PROCEDURE — 82274 ASSAY TEST FOR BLOOD FECAL: CPT

## 2023-07-15 PROCEDURE — 83540 ASSAY OF IRON: CPT

## 2023-07-15 PROCEDURE — 83550 IRON BINDING TEST: CPT

## 2023-07-16 LAB — INR PPP: 2.47 (ref 0.85–1.15)

## 2023-07-17 RX ORDER — LISINOPRIL 40 MG/1
40 TABLET ORAL DAILY
Qty: 90 TABLET | Refills: 1 | Status: SHIPPED | OUTPATIENT
Start: 2023-07-17 | End: 2023-07-25

## 2023-07-17 NOTE — TELEPHONE ENCOUNTER
Routing refill request to provider for review/approval because:  Drug not active on patient's medication list    BRIDGET Vital  [facility] Primary Care Triage

## 2023-07-18 LAB — HEMOCCULT STL QL IA: POSITIVE

## 2023-07-19 ENCOUNTER — MYC MEDICAL ADVICE (OUTPATIENT)
Dept: FAMILY MEDICINE | Facility: CLINIC | Age: 53
End: 2023-07-19
Payer: COMMERCIAL

## 2023-07-19 DIAGNOSIS — I85.10 SECONDARY ESOPHAGEAL VARICES WITHOUT BLEEDING (H): Primary | ICD-10-CM

## 2023-07-19 DIAGNOSIS — K76.6 PORTAL HYPERTENSION (H): ICD-10-CM

## 2023-07-19 DIAGNOSIS — K70.30 ALCOHOLIC CIRRHOSIS OF LIVER WITHOUT ASCITES (H): Primary | ICD-10-CM

## 2023-07-19 DIAGNOSIS — R19.5 POSITIVE FIT (FECAL IMMUNOCHEMICAL TEST): ICD-10-CM

## 2023-07-19 DIAGNOSIS — I85.01 BLEEDING ESOPHAGEAL VARICES, UNSPECIFIED ESOPHAGEAL VARICES TYPE (H): ICD-10-CM

## 2023-07-19 DIAGNOSIS — D50.0 IRON DEFICIENCY ANEMIA DUE TO CHRONIC BLOOD LOSS: ICD-10-CM

## 2023-07-20 ENCOUNTER — MYC MEDICAL ADVICE (OUTPATIENT)
Dept: FAMILY MEDICINE | Facility: CLINIC | Age: 53
End: 2023-07-20
Payer: COMMERCIAL

## 2023-07-20 DIAGNOSIS — K70.30 ALCOHOLIC CIRRHOSIS OF LIVER WITHOUT ASCITES (H): Primary | ICD-10-CM

## 2023-07-20 DIAGNOSIS — R60.0 EDEMA OF BOTH LEGS: ICD-10-CM

## 2023-07-20 NOTE — TELEPHONE ENCOUNTER
Routing to provider to review and advise regarding if you would like the patient to make an appointment.    Kristina Kjellberg, MSN, RN  [facility] Primary Care Triage

## 2023-07-21 ENCOUNTER — HOSPITAL ENCOUNTER (OUTPATIENT)
Facility: CLINIC | Age: 53
End: 2023-07-21
Attending: INTERNAL MEDICINE | Admitting: INTERNAL MEDICINE
Payer: COMMERCIAL

## 2023-07-21 ENCOUNTER — TELEPHONE (OUTPATIENT)
Dept: GASTROENTEROLOGY | Facility: CLINIC | Age: 53
End: 2023-07-21
Payer: COMMERCIAL

## 2023-07-21 ENCOUNTER — TELEPHONE (OUTPATIENT)
Dept: GASTROENTEROLOGY | Facility: CLINIC | Age: 53
End: 2023-07-21

## 2023-07-21 RX ORDER — TORSEMIDE 20 MG/1
20 TABLET ORAL DAILY
Qty: 30 TABLET | Refills: 0 | Status: SHIPPED | OUTPATIENT
Start: 2023-07-21 | End: 2023-08-03

## 2023-07-21 NOTE — TELEPHONE ENCOUNTER
Please inform patient/his wife that an order for upper endoscopy (esophageal gastroduodenoscopy) was placed on 6/28/2023.  New order is active.  Please help patient connect with endoscopy department at [facility] for scheduling.

## 2023-07-21 NOTE — TELEPHONE ENCOUNTER
Please have patient log daily weights and bring it for his next clinic visit.  I like to see him next week.  Please take a same-day spot.  In the meantime I am going to start him on torsemide 20 mg to be taken once a day in the morning.  I will send a short prescription to pharmacy.

## 2023-07-21 NOTE — TELEPHONE ENCOUNTER
RN received a call from patient's wife, Adina. RN relayed messages from provider below:     Jimmie Hoyt MD  7/21/23  8:09 AM  Note  Please have patient log daily weights and bring it for his next clinic visit.  I like to see him next week.  Please take a same-day spot.  In the meantime I am going to start him on torsemide 20 mg to be taken once a day in the morning.  I will send a short prescription to pharmacy.        Jimmie Hoyt MD  7/21/23  8:04 AM  Note  Please inform patient/his wife that an order for upper endoscopy (esophageal gastroduodenoscopy) was placed on 6/28/2023.  New order is active.  Please help patient connect with endoscopy department at [facility] for scheduling.          RN is unable to see EGD order anywhere in patient's chart.     RN notes there is a referral for GI for liver, but Adina states patient is already scheduled with GI for his liver and he needs a separate referral for GI specifically for his bleeding in his stomach.     RN scheduled patient for appointment with PCP at 4 pm 7/25/23.     RN provided education for torsemide 20 mg 1x/day in the morning.     Adina verbalized understanding. She is requesting a [employer] message confirming order for EGD and GI for stomach referral placed.     Adina states patient is feeling weak, having some shortness of breath, and seeing intermittent blood in his stool. She denies fainting, lightheadedness, or chest pain. RN provided 911 and emergency room precautions.     Routing to provider to please review and place appropriate referral and order. Thank you.     Ora Peratla, EDDAN, RN  [facility]  Nurse Triage, Family Practice

## 2023-07-21 NOTE — TELEPHONE ENCOUNTER
Pre assessment completed for upcoming procedure.      Procedure details:    Spoke with Pt wife Adina - with consent to communicate on file    Patient scheduled for Upper endoscopy (EGD) on 8/3/23.     Arrival time: 1300. Procedure time 1430    Pre op exam needed? N/A    Facility location: [facility]; [address], [address], [place] [address]    Sedation type: MAC    Indication for procedure: cirhosis of liver with portal hypertension, anemia with FIT test positive. upper GI bleeding suspected from varices or portal gastritis    COVID policy reviewed.    Designated  policy reviewed. Instructed to have someone stay 24 hours post procedure.       Chart review:     Electronic implanted devices? No    Diabetic? No    Diabetic medication HOLDING recommendations: (if applicable)  Oral diabetic medications: No  Diabetic injectables: No  Insulin: No      Medication review:    Anticoagulants? No    NSAIDS? No    Other medication HOLDING recommendations:  N/A      Prep for procedure:     Prep instructions sent via [employer]     Reviewed procedure prep instructions.     Patient verbalized understanding and had no questions or concerns at this time.        Ora Whatley RN  Endoscopy Procedure Pre Assessment RN  [phone] option 4

## 2023-07-21 NOTE — TELEPHONE ENCOUNTER
RN attempted to call and relay provider message below. See telephone encounter 7/21/23.    EDDA BaltazarN, RN, PHN  [facility] Primary Care [facility]

## 2023-07-21 NOTE — TELEPHONE ENCOUNTER
See telephone encounter 7/21/23. RN attempted to call and relay provider response below.     EDDA BaltazarN, RN, PHN  [facility] Primary Care [facility]

## 2023-07-21 NOTE — TELEPHONE ENCOUNTER
"Endoscopy Scheduling Screen    Have you had a positive Covid test in the last 14 days?  No    Are you active on MyChart?   Yes    What insurance is in the chart?  Other:  [employer]    Ordering/Referring Provider: FABIEN LINDO   (If ordering provider performs procedure, schedule with ordering provider unless otherwise instructed. )    BMI: Estimated body mass index is 33.67 kg/m  as calculated from the following:    Height as of 9/16/22: 1.753 m (5' 9\").    Weight as of 6/25/23: 103.4 kg (228 lb).     Sedation Ordered  MAC/deep sedation.   BMI<= 45 45 < BMI <= 48 48 < BMI < = 50  BMI > 50   No Restrictions No [facility]  No [facility]  [facility] with exceptions Hospital Only [place] Only       Do you have a history of malignant hyperthermia or adverse reaction to anesthesia?  No    (Females) Are you currently pregnant?   No     Have you been diagnosed or told you have pulmonary hypertension?   No    Do you have an LVAD?  No    Have you been told you have moderate to severe sleep apnea?  No    Have you been told you have COPD, asthma, or any other lung disease?  No    Do you have any heart conditions?  No     Have you ever had or are you awaiting a heart or lung transplant?   No    Have you had a stroke or transient ischemic attack (TIA aka \"mini stroke\" in the last 6 months?   No    Have you been diagnosed with or been told you have cirrhosis of the liver?   Yes (RN Review required for scheduling unless scheduling in Hospital.)    Are you currently on dialysis?   No    Do you need assistance transferring?   No    BMI: Estimated body mass index is 33.67 kg/m  as calculated from the following:    Height as of 9/16/22: 1.753 m (5' 9\").    Weight as of 6/25/23: 103.4 kg (228 lb).     Is patients BMI > 40 and scheduling location [facility]?  No    Do you take the medication Phentermine, Ozempic or Wegovy?  No    Do you take the medication Naltrexone?  No    Do you take blood thinners?  No      Prep   Are you currently on dialysis or do you " have chronic kidney disease?      Do you have a diagnosis of diabetes?      Do you have a diagnosis of cystic fibrosis (CF)?      On a regular basis do you go 3 -5 days between bowel movements?      BMI > 40?      Preferred Pharmacy:    [facility] - [id number]   [address] [place] [address]  Phone: [phone] Fax: [phone]    [id number] [place], [place] - [address]  [address] [place] [address]  Phone: [phone] Fax: [phone]      Final Scheduling Details   Colonoscopy prep sent?      Procedure scheduled  Upper endoscopy (EGD)    Surgeon:  Ayse     Date of procedure:  8/3     Schedule PAC:   No    Location  [facility]    Sedation   MAC/Deep Sedation    Patient Reminders:    You will receive a call from a Nurse to review instructions and health history.  This assessment must be completed prior to your procedure.  Failure to complete the Nurse assessment may result in the procedure being cancelled.       On the day of your procedure, please designate an adult(s) who can drive you home stay with you for the next 24 hours. The medicines used in the exam will make you sleepy. You will not be able to drive.       You cannot take public transportation, ride share services, or non-medical taxi service without a responsible caregiver.  Medical transport services are allowed with the requirement that a responsible caregiver will receive you at your destination.  We require that drivers and caregivers are confirmed prior to your procedure.

## 2023-07-25 ENCOUNTER — OFFICE VISIT (OUTPATIENT)
Dept: FAMILY MEDICINE | Facility: CLINIC | Age: 53
End: 2023-07-25
Payer: COMMERCIAL

## 2023-07-25 VITALS
HEART RATE: 108 BPM | HEIGHT: 69 IN | BODY MASS INDEX: 34.98 KG/M2 | RESPIRATION RATE: 19 BRPM | SYSTOLIC BLOOD PRESSURE: 92 MMHG | TEMPERATURE: 98.8 F | DIASTOLIC BLOOD PRESSURE: 59 MMHG | WEIGHT: 236.2 LBS | OXYGEN SATURATION: 96 %

## 2023-07-25 DIAGNOSIS — K76.6 PORTAL HYPERTENSION (H): ICD-10-CM

## 2023-07-25 DIAGNOSIS — K70.30 ALCOHOLIC CIRRHOSIS OF LIVER WITHOUT ASCITES (H): ICD-10-CM

## 2023-07-25 DIAGNOSIS — Z72.0 TOBACCO ABUSE DISORDER: ICD-10-CM

## 2023-07-25 DIAGNOSIS — I10 ESSENTIAL HYPERTENSION: ICD-10-CM

## 2023-07-25 DIAGNOSIS — R60.0 BILATERAL LEG EDEMA: ICD-10-CM

## 2023-07-25 DIAGNOSIS — D64.9 ANEMIA DUE TO UNKNOWN MECHANISM: Primary | ICD-10-CM

## 2023-07-25 DIAGNOSIS — D69.6 THROMBOCYTOPENIA (H): ICD-10-CM

## 2023-07-25 DIAGNOSIS — B86 SCABIES: ICD-10-CM

## 2023-07-25 LAB
ERYTHROCYTE [DISTWIDTH] IN BLOOD BY AUTOMATED COUNT: 13.8 % (ref 10–15)
HCT VFR BLD AUTO: 18.4 % (ref 40–53)
HGB BLD-MCNC: 6.3 G/DL (ref 13.3–17.7)
MCH RBC QN AUTO: 36.2 PG (ref 26.5–33)
MCHC RBC AUTO-ENTMCNC: 34.2 G/DL (ref 31.5–36.5)
MCV RBC AUTO: 106 FL (ref 78–100)
PLATELET # BLD AUTO: 144 10E3/UL (ref 150–450)
RBC # BLD AUTO: 1.74 10E6/UL (ref 4.4–5.9)
WBC # BLD AUTO: 20.4 10E3/UL (ref 4–11)

## 2023-07-25 PROCEDURE — 85027 COMPLETE CBC AUTOMATED: CPT | Performed by: INTERNAL MEDICINE

## 2023-07-25 PROCEDURE — 99214 OFFICE O/P EST MOD 30 MIN: CPT | Performed by: INTERNAL MEDICINE

## 2023-07-25 PROCEDURE — 85610 PROTHROMBIN TIME: CPT | Performed by: INTERNAL MEDICINE

## 2023-07-25 PROCEDURE — 80053 COMPREHEN METABOLIC PANEL: CPT | Performed by: INTERNAL MEDICINE

## 2023-07-25 PROCEDURE — 36415 COLL VENOUS BLD VENIPUNCTURE: CPT | Performed by: INTERNAL MEDICINE

## 2023-07-25 RX ORDER — LACTULOSE 10 G/15ML
SOLUTION ORAL
Qty: 473 ML | Refills: 1 | Status: SHIPPED | OUTPATIENT
Start: 2023-07-25 | End: 2023-10-19

## 2023-07-25 RX ORDER — PERMETHRIN 50 MG/G
CREAM TOPICAL
Qty: 60 G | Refills: 1 | Status: SHIPPED | OUTPATIENT
Start: 2023-07-25 | End: 2023-09-15

## 2023-07-25 ASSESSMENT — PAIN SCALES - GENERAL: PAINLEVEL: NO PAIN (0)

## 2023-07-25 NOTE — LETTER
July 25, 2023      Mary Lopez  [address] [address]        To Whom It May Concern:    Mary Lopez was seen in our clinic today. He advice not to return to work until 08/08/2023.      Sincerely,        Jimmie Hoyt MD

## 2023-07-25 NOTE — PROGRESS NOTES
"  Assessment & Plan     1.  Anemia due to blood loss.  Hemoglobin dropped today to 6.3 g/dL.  The lab came back after patient left but I was able to get in touch with wife who stopped by due to my phone call and agreed to take the patient to hospital.  He needs packed red cell transfusion.  The possible causes of bleeding or esophageal varices versus portal gastritis is most likely cause.  Peptic ulcer disease also could be a possibility.  He is scheduled for upper endoscopy August 3 but perhaps it needs to be done sooner.    2.  Alcohol cirrhosis of liver without ascites.  On clinical grounds today I feel that he may have some ascites.  I felt he had shifting dullness.  An ultrasound of the abdomen ordered.    3.  Scabies suspected.  Permethrin treatment recommended see if it helps.    4.  Thrombocytopenia secondary to alcoholism much improved platelet count now has come up to 144,000.    5.  Essential hypertension.  Still on lisinopril 40 mg a day.  He is hypotensive today with blood pressure in the 90s so advised to discontinue lisinopril.  CMP is pending along with INR.  Based on that we will recommend further adjustment in his diuretic therapy.  Potassium sparing diuretic will be a good choice particular with cirrhosis of liver provided his potassium is not high.    6.  Portal hypertension with esophageal varices seen on a CT scan.    7.  Bilateral leg edema little better since starting torsemide.      8.  Tobacco abuse disorder.  He chews tobacco.    9.  Persistent insomnia of longstanding currently doing better with doxepin 10 mg a day.       BMI:   Estimated body mass index is 34.88 kg/m  as calculated from the following:    Height as of this encounter: 1.753 m (5' 9\").    Weight as of this encounter: 107.1 kg (236 lb 3.2 oz).       Jimmie Hoyt MD  [facility]    Elmo Hernandez is a 52 year old, presenting for the following health issues:  Swelling      7/25/2023     4:07 PM "   Additional Questions   Roomed by Neris     History of Present Illness       Reason for visit:  Fluid retention weakness fatigue shortness of breath    He eats 4 or more servings of fruits and vegetables daily.He consumes 1 sweetened beverage(s) daily.He exercises with enough effort to increase his heart rate 9 or less minutes per day.  He exercises with enough effort to increase his heart rate 5 days per week.   He is taking medications regularly.       Concern - F/U  Onset: month ago   Description: Weakness, Fatigue, SOB, Blurry Vision, Chest pain, High heart rate   Intensity: moderate, severe  Progression of Symptoms:  same  Accompanying Signs & Symptoms:   Previous history of similar problem:   Precipitating factors:        Worsened by: When being active   Alleviating factors:        Improved by: Medications prescribed, resting   Therapies tried and outcome: None      52-year-old gentleman who was hospitalized on 6/24/2023 with alcohol hepatitis and alcohol cirrhosis of liver as well as scrotal cellulitis.  He was treated with IV antibiotics and eventually the cellulitis resolved.  He was found to have portal hypertension, splenomegaly and esophageal varices on a CAT scan.  At the time of discharge his hemoglobin was 8.9 on 6/29/2023.    On 7/15/2023 his hemoglobin had dropped to 8 g/dL.  His platelet count had started coming up.  In theFIT test came back positive later on.  Patient scheduled for upper endoscopy on August 3.    On 7/20/2023 patient called in stating he was having increased leg edema.  I sent a prescription of torsemide 20 mg to taken once a day.  He has been taking it.  He has been also checking his weight daily.  His weight was 240 pounds in today it dropped down to 232 pounds.  But indicates that he still has leg edema and has started noticing increasing abdominal girth.  No fever or chills.    Patient still works and his work is physical.  Few days ago when lifting he was extremely short of  breath and felt lightheaded.  He is taking lactulose once a day day instead of twice a day.  He is having 3-4 bowel movements a day.    Patient complains of itching anteriorly on the trunk and some in the back as well as the upper arms bilaterally.  He has a rash.    Review of Systems   Constitutional, HEENT, cardiovascular, pulmonary, GI, , musculoskeletal, neuro, skin, endocrine and psych systems are negative, except as otherwise noted.      Objective    There were no vitals taken for this visit.  There is no height or weight on file to calculate BMI.  Physical Exam   GENERAL: healthy, alert and no distress  EYES: Eyes grossly normal to inspection and conjunctiva is icteric.  NECK: no adenopathy, no asymmetry, masses, or scars and thyroid normal to palpation  RESP: lungs clear to auscultation - no rales, rhonchi or wheezes  CV: regular rate and rhythm, normal S1 S2, no S3 or S4, no murmur, click or rub, no peripheral edema and peripheral pulses strong  ABDOMEN: soft, nontender, without hepatosplenomegaly or masses, soft, nontender, no organomegaly or masses, bowel sounds normal, and I feel there is some shifting dullness suggestive of ascites.  MS: no gross musculoskeletal defects noted, no edema  SKIN: Skin is slightly icteric.  There is small slightly raised papular rashes across the chest and some in the back.  Concern it could be scabies.

## 2023-07-26 ENCOUNTER — TELEPHONE (OUTPATIENT)
Dept: GASTROENTEROLOGY | Facility: CLINIC | Age: 53
End: 2023-07-26

## 2023-07-26 ENCOUNTER — MYC MEDICAL ADVICE (OUTPATIENT)
Dept: FAMILY MEDICINE | Facility: CLINIC | Age: 53
End: 2023-07-26

## 2023-07-26 DIAGNOSIS — D64.9 ANEMIA DUE TO UNKNOWN MECHANISM: ICD-10-CM

## 2023-07-26 DIAGNOSIS — K70.30 ALCOHOLIC CIRRHOSIS OF LIVER WITHOUT ASCITES (H): Primary | ICD-10-CM

## 2023-07-26 LAB
ALBUMIN SERPL BCG-MCNC: 2.4 G/DL (ref 3.5–5.2)
ALP SERPL-CCNC: 214 U/L (ref 40–129)
ALT SERPL W P-5'-P-CCNC: 38 U/L (ref 0–70)
ANION GAP SERPL CALCULATED.3IONS-SCNC: 8 MMOL/L (ref 7–15)
AST SERPL W P-5'-P-CCNC: 97 U/L (ref 0–45)
BILIRUB SERPL-MCNC: 7.1 MG/DL
BUN SERPL-MCNC: 41.1 MG/DL (ref 6–20)
CALCIUM SERPL-MCNC: 8.2 MG/DL (ref 8.6–10)
CHLORIDE SERPL-SCNC: 99 MMOL/L (ref 98–107)
CREAT SERPL-MCNC: 1.72 MG/DL (ref 0.67–1.17)
DEPRECATED HCO3 PLAS-SCNC: 23 MMOL/L (ref 22–29)
GFR SERPL CREATININE-BSD FRML MDRD: 48 ML/MIN/1.73M2
GLUCOSE SERPL-MCNC: 167 MG/DL (ref 70–99)
INR PPP: 2.87 (ref 0.85–1.15)
POTASSIUM SERPL-SCNC: 4.8 MMOL/L (ref 3.4–5.3)
PROT SERPL-MCNC: 7.4 G/DL (ref 6.4–8.3)
SODIUM SERPL-SCNC: 130 MMOL/L (ref 136–145)

## 2023-07-26 NOTE — TELEPHONE ENCOUNTER
Caller: WIFE  Reason for Reschedule/Cancellation (please be detailed, any staff messages or encounters to note?): IN HOSPITAL AND THEY DID IT THERE      Prior to reschedule please review:  Ordering Provider:     FABIEN LINDO     Sedation per order: MAC  Does patient have any ASC Exclusions, please identify?:       Notes on Cancelled Procedure:  Procedure: Lower Endoscopy [Colonoscopy]   Date: 8/3  Location: [facility]; [address] [place] [address], [address], [place] [address]  Surgeon: JAREN      Rescheduled: No  Procedure:    Date:   Location:   Surgeon:   Sedation Level Scheduled  ,  Reason for Sedation Level   Prep/Instructions updated and sent:      Send In - basket message to Panc - Blaise Pool if EUS  procedure is canceled or rescheduled: [ N/A, YES or NO]

## 2023-08-02 ENCOUNTER — LAB (OUTPATIENT)
Dept: LAB | Facility: CLINIC | Age: 53
End: 2023-08-02
Payer: COMMERCIAL

## 2023-08-02 ENCOUNTER — MYC MEDICAL ADVICE (OUTPATIENT)
Dept: FAMILY MEDICINE | Facility: CLINIC | Age: 53
End: 2023-08-02

## 2023-08-02 DIAGNOSIS — D64.9 ANEMIA DUE TO UNKNOWN MECHANISM: ICD-10-CM

## 2023-08-02 DIAGNOSIS — K70.30 ALCOHOLIC CIRRHOSIS OF LIVER WITHOUT ASCITES (H): ICD-10-CM

## 2023-08-02 LAB
ANION GAP SERPL CALCULATED.3IONS-SCNC: 5 MMOL/L (ref 7–15)
BUN SERPL-MCNC: 16.5 MG/DL (ref 6–20)
CALCIUM SERPL-MCNC: 9 MG/DL (ref 8.6–10)
CHLORIDE SERPL-SCNC: 101 MMOL/L (ref 98–107)
CREAT SERPL-MCNC: 0.85 MG/DL (ref 0.67–1.17)
DEPRECATED HCO3 PLAS-SCNC: 24 MMOL/L (ref 22–29)
ERYTHROCYTE [DISTWIDTH] IN BLOOD BY AUTOMATED COUNT: 16.1 % (ref 10–15)
GFR SERPL CREATININE-BSD FRML MDRD: >90 ML/MIN/1.73M2
GLUCOSE SERPL-MCNC: 162 MG/DL (ref 70–99)
HCT VFR BLD AUTO: 27.1 % (ref 40–53)
HGB BLD-MCNC: 9.2 G/DL (ref 13.3–17.7)
MCH RBC QN AUTO: 33 PG (ref 26.5–33)
MCHC RBC AUTO-ENTMCNC: 33.9 G/DL (ref 31.5–36.5)
MCV RBC AUTO: 97 FL (ref 78–100)
PLATELET # BLD AUTO: 98 10E3/UL (ref 150–450)
POTASSIUM SERPL-SCNC: 4.6 MMOL/L (ref 3.4–5.3)
RBC # BLD AUTO: 2.79 10E6/UL (ref 4.4–5.9)
SODIUM SERPL-SCNC: 130 MMOL/L (ref 136–145)
WBC # BLD AUTO: 13.3 10E3/UL (ref 4–11)

## 2023-08-02 PROCEDURE — 85027 COMPLETE CBC AUTOMATED: CPT

## 2023-08-02 PROCEDURE — 36415 COLL VENOUS BLD VENIPUNCTURE: CPT

## 2023-08-02 PROCEDURE — 80048 BASIC METABOLIC PNL TOTAL CA: CPT

## 2023-08-03 ENCOUNTER — OFFICE VISIT (OUTPATIENT)
Dept: FAMILY MEDICINE | Facility: CLINIC | Age: 53
End: 2023-08-03
Payer: COMMERCIAL

## 2023-08-03 VITALS
TEMPERATURE: 98.7 F | OXYGEN SATURATION: 99 % | WEIGHT: 259.8 LBS | DIASTOLIC BLOOD PRESSURE: 79 MMHG | HEART RATE: 100 BPM | RESPIRATION RATE: 20 BRPM | HEIGHT: 69 IN | SYSTOLIC BLOOD PRESSURE: 124 MMHG | BODY MASS INDEX: 38.48 KG/M2

## 2023-08-03 DIAGNOSIS — G47.00 PERSISTENT INSOMNIA: ICD-10-CM

## 2023-08-03 DIAGNOSIS — K76.6 PORTAL HYPERTENSIVE GASTROPATHY (H): ICD-10-CM

## 2023-08-03 DIAGNOSIS — D64.9 ANEMIA DUE TO UNKNOWN MECHANISM: ICD-10-CM

## 2023-08-03 DIAGNOSIS — K70.31 ALCOHOLIC CIRRHOSIS OF LIVER WITH ASCITES (H): Primary | ICD-10-CM

## 2023-08-03 DIAGNOSIS — K31.89 PORTAL HYPERTENSIVE GASTROPATHY (H): ICD-10-CM

## 2023-08-03 DIAGNOSIS — K70.30 ALCOHOLIC CIRRHOSIS OF LIVER WITHOUT ASCITES (H): ICD-10-CM

## 2023-08-03 DIAGNOSIS — I85.10 SECONDARY ESOPHAGEAL VARICES WITHOUT BLEEDING (H): ICD-10-CM

## 2023-08-03 DIAGNOSIS — I10 ESSENTIAL HYPERTENSION: ICD-10-CM

## 2023-08-03 DIAGNOSIS — L85.3 DRY SKIN: ICD-10-CM

## 2023-08-03 DIAGNOSIS — R60.0 BILATERAL LEG EDEMA: ICD-10-CM

## 2023-08-03 PROCEDURE — 99214 OFFICE O/P EST MOD 30 MIN: CPT | Performed by: INTERNAL MEDICINE

## 2023-08-03 RX ORDER — SPIRONOLACTONE 50 MG/1
50 TABLET, FILM COATED ORAL DAILY
Qty: 30 TABLET | Refills: 0 | Status: SHIPPED | OUTPATIENT
Start: 2023-08-03 | End: 2023-08-15

## 2023-08-03 RX ORDER — SPIRONOLACTONE 50 MG/1
50 TABLET, FILM COATED ORAL DAILY
Qty: 90 TABLET | OUTPATIENT
Start: 2023-08-03

## 2023-08-03 RX ORDER — ZINC OXIDE 20 %
OINTMENT (GRAM) TOPICAL PRN
Status: ON HOLD | COMMUNITY
Start: 2023-08-03 | End: 2024-02-29

## 2023-08-03 RX ORDER — FUROSEMIDE 20 MG
20 TABLET ORAL DAILY
Qty: 30 TABLET | Refills: 0 | Status: SHIPPED | OUTPATIENT
Start: 2023-08-03 | End: 2023-08-15

## 2023-08-03 RX ORDER — PHENOL 1.4 %
10 AEROSOL, SPRAY (ML) MUCOUS MEMBRANE
COMMUNITY
Start: 2023-08-03 | End: 2024-05-23

## 2023-08-03 ASSESSMENT — PAIN SCALES - GENERAL: PAINLEVEL: NO PAIN (0)

## 2023-08-03 NOTE — LETTER
August 3, 2023      Mary EPPERSON Jessica  [address] [address]        To Whom It May Concern:    Mary Lopez was seen in our clinic today. He will not be able to return to work until 08/28/2023.       Sincerely,        Jimmie Hoyt MD

## 2023-08-03 NOTE — PROGRESS NOTES
"  Assessment & Plan     1.  Alcohol cirrhosis of the liver with mild ascite and fluid retention.  Currently start patient on spironolactone 50 mg daily along with furosemide 20 mg a day.  Return for recheck in 1 week with BMP and CBC.  Will be seen by my partner, Carolyn Cruz.  MELD score of 26.  2.  Bilateral leg edema and fluid retention secondary to cirrhosis of liver, low albumin, anemia.  Above treatment recommended.  3.  Anemia of multifactorial cause.  Recent gastroscopy did not reveal source of bleeding though grade 2 gastroesophageal varices were identified along with nonbleeding portal hypertension gastropathy.  Could have hemorrhoids besides bone marrow suppression of alcohol.  INR is high as well due to cirrhosis.  Hemoglobin 9.2 yesterday.  Was 9.5 on 7/28/2023.  Discharge.  4.  Portal hypertensive gastropathy.  Currently on omeprazole 20 mg twice a day.  5.  Essential hypertension with blood pressure under control.  6.  Secondary esophageal 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.  7.  Persistent insomnia for which patient is on doxepin 10 mg a day along with melatonin       BMI:   Estimated body mass index is 38.37 kg/m  as calculated from the following:    Height as of this encounter: 1.753 m (5' 9\").    Weight as of this encounter: 117.8 kg (259 lb 12.8 oz).         Jimmie Hoyt MD  [facility]    Elmo Hernandez is a 52 year old, presenting for the following health issues:  Results (Discuss lab results and receive dietary advice. )      8/3/2023     9:28 AM   Additional Questions   Roomed by Josefa PEREZ   Accompanied by Self         8/3/2023     9:28 AM   Patient Reported Additional Medications   Patient reports taking the following new medications None       History of Present Illness       Reason for visit:  Fluid retention weakness fatigue shortness of breath    He eats 4 or more servings of fruits and vegetables daily.He consumes 1 sweetened beverage(s) daily.He exercises with enough " "effort to increase his heart rate 9 or less minutes per day.  He exercises with enough effort to increase his heart rate 5 days per week.   He is taking medications regularly.     52-year-old gentleman with known history of cirrhosis of liver related to alcoholism, hypertension was admitted to hospital on 7/25/2023 with a hemoglobin of 6.3 and also ANGEL due to diuretic therapy for edema.  During hospitalization an upper endoscopy was performed.  Gastroesophageal varices identified but they were not bleeding.  Grade 2 varices.  Portal gastropathy was identified but no bleeding source.  Patient has not noticed any blood in the stool since discharge.  He was transfused with red cells and predischarge hemoglobin on 27th was 9.5.  His INR is elevated due to cirrhosis of liver.    Since discharge patient has noticed increasing leg edema and even some bloating of the stomach.  During the hospitalization ultrasound of the abdomen revealed small amount of ascites.  Is currently not on any diuretic therapy.  By our scale his weight is up 13 pounds.  So in the last few days he has gained quite a bit of weight due to fluid retention.      On 8/2/2023 sodium of 130 was noted creatinine 0.85..  Hemoglobin 9.2 and platelet count 97,000.  Review of Systems   Constitutional, HEENT, cardiovascular, pulmonary, GI, , musculoskeletal, neuro, skin, endocrine and psych systems are negative, except as otherwise noted.      Objective    /79 (BP Location: Right arm, Patient Position: Sitting, Cuff Size: Adult Large)   Pulse 100   Temp 98.7  F (37.1  C) (Oral)   Resp 20   Ht 1.753 m (5' 9\")   Wt 117.8 kg (259 lb 12.8 oz)   SpO2 99%   BMI 38.37 kg/m    Body mass index is 38.37 kg/m .  Physical Exam   GENERAL: healthy, alert and no distress  EYES: Jaundiced sclera.  NECK: no adenopathy, no asymmetry, masses, or scars and thyroid normal to palpation  RESP: lungs clear to auscultation - no rales, rhonchi or wheezes  CV: regular " rates and rhythm, normal S1 S2, no S3 or S4, no murmur, click or rub, and 4+ leg edema noted  ABDOMEN: soft, nontender, without hepatosplenomegaly or masses, bowel sounds normal, and feel there is some shifting dullness.  MS: extremities normal- no gross deformities noted    No results found for any visits on 08/03/23.  Results for orders placed or performed in visit on 08/02/23 (from the past 24 hour(s))   CBC with platelets   Result Value Ref Range    WBC Count 13.3 (H) 4.0 - 11.0 10e3/uL    RBC Count 2.79 (L) 4.40 - 5.90 10e6/uL    Hemoglobin 9.2 (L) 13.3 - 17.7 g/dL    Hematocrit 27.1 (L) 40.0 - 53.0 %    MCV 97 78 - 100 fL    MCH 33.0 26.5 - 33.0 pg    MCHC 33.9 31.5 - 36.5 g/dL    RDW 16.1 (H) 10.0 - 15.0 %    Platelet Count 98 (L) 150 - 450 10e3/uL   Basic metabolic panel   Result Value Ref Range    Sodium 130 (L) 136 - 145 mmol/L    Potassium 4.6 3.4 - 5.3 mmol/L    Chloride 101 98 - 107 mmol/L    Carbon Dioxide (CO2) 24 22 - 29 mmol/L    Anion Gap 5 (L) 7 - 15 mmol/L    Urea Nitrogen 16.5 6.0 - 20.0 mg/dL    Creatinine 0.85 0.67 - 1.17 mg/dL    Calcium 9.0 8.6 - 10.0 mg/dL    Glucose 162 (H) 70 - 99 mg/dL    GFR Estimate >90 >60 mL/min/1.73m2

## 2023-08-05 ENCOUNTER — MYC MEDICAL ADVICE (OUTPATIENT)
Dept: FAMILY MEDICINE | Facility: CLINIC | Age: 53
End: 2023-08-05
Payer: COMMERCIAL

## 2023-08-05 NOTE — PROGRESS NOTES
Talk to the patient's wife today.  The patient continues to gain weight in spite of having started furosemide 20 mg and spironolactone 50 mg daily.  Advised to double the dose in other words he will take furosemide 40 mg daily along with spironolactone 100 mg daily.  He has lab and follow-up appointment with Vanessa Cruz next week.  Also will need to follow fluid restriction.

## 2023-08-07 ENCOUNTER — MYC MEDICAL ADVICE (OUTPATIENT)
Dept: FAMILY MEDICINE | Facility: CLINIC | Age: 53
End: 2023-08-07
Payer: COMMERCIAL

## 2023-08-07 NOTE — TELEPHONE ENCOUNTER
I talked to the patient's wife today morning.  Advised to continue with fluid restriction and the oral diuretics prescribed.  Follow-up as planned in the next couple of days

## 2023-08-07 NOTE — TELEPHONE ENCOUNTER
Talk to the patient's wife over the phone.  He has lost 1 pound now from yesterday.  Continue fluid restriction and diuretic therapy.  He has lab and appointment coming up in a couple of days with Carolyn Cruz.

## 2023-08-09 ENCOUNTER — TELEPHONE (OUTPATIENT)
Dept: FAMILY MEDICINE | Facility: CLINIC | Age: 53
End: 2023-08-09

## 2023-08-09 ENCOUNTER — LAB (OUTPATIENT)
Dept: LAB | Facility: CLINIC | Age: 53
End: 2023-08-09
Payer: COMMERCIAL

## 2023-08-09 DIAGNOSIS — D64.9 ANEMIA DUE TO UNKNOWN MECHANISM: ICD-10-CM

## 2023-08-09 LAB
ANION GAP SERPL CALCULATED.3IONS-SCNC: 5 MMOL/L (ref 7–15)
BUN SERPL-MCNC: 13.4 MG/DL (ref 6–20)
CALCIUM SERPL-MCNC: 8.5 MG/DL (ref 8.6–10)
CHLORIDE SERPL-SCNC: 101 MMOL/L (ref 98–107)
CREAT SERPL-MCNC: 0.83 MG/DL (ref 0.67–1.17)
DEPRECATED HCO3 PLAS-SCNC: 23 MMOL/L (ref 22–29)
ERYTHROCYTE [DISTWIDTH] IN BLOOD BY AUTOMATED COUNT: 16.3 % (ref 10–15)
GFR SERPL CREATININE-BSD FRML MDRD: >90 ML/MIN/1.73M2
GLUCOSE SERPL-MCNC: 133 MG/DL (ref 70–99)
HCT VFR BLD AUTO: 23.1 % (ref 40–53)
HGB BLD-MCNC: 7.7 G/DL (ref 13.3–17.7)
MCH RBC QN AUTO: 32.5 PG (ref 26.5–33)
MCHC RBC AUTO-ENTMCNC: 33.3 G/DL (ref 31.5–36.5)
MCV RBC AUTO: 98 FL (ref 78–100)
PLATELET # BLD AUTO: 109 10E3/UL (ref 150–450)
POTASSIUM SERPL-SCNC: 4.6 MMOL/L (ref 3.4–5.3)
RBC # BLD AUTO: 2.37 10E6/UL (ref 4.4–5.9)
SODIUM SERPL-SCNC: 129 MMOL/L (ref 136–145)
WBC # BLD AUTO: 12.8 10E3/UL (ref 4–11)

## 2023-08-09 PROCEDURE — 85027 COMPLETE CBC AUTOMATED: CPT

## 2023-08-09 PROCEDURE — 83550 IRON BINDING TEST: CPT

## 2023-08-09 PROCEDURE — 36415 COLL VENOUS BLD VENIPUNCTURE: CPT

## 2023-08-09 PROCEDURE — 82728 ASSAY OF FERRITIN: CPT

## 2023-08-09 PROCEDURE — 80048 BASIC METABOLIC PNL TOTAL CA: CPT

## 2023-08-09 PROCEDURE — 83540 ASSAY OF IRON: CPT

## 2023-08-09 NOTE — TELEPHONE ENCOUNTER
RN called patient and LVM to call clinic at [phone].     If patient calls back please relay provider message below and follow triage workflow for low hemoglobin levels.    BRIDGET Vital  [facility] Triage

## 2023-08-09 NOTE — TELEPHONE ENCOUNTER
Please call patient to triage for this low hemoglobin and advised to be seen in the ER based on the symptoms  PCP is out of office, and route to the provider pool with any responses

## 2023-08-09 NOTE — TELEPHONE ENCOUNTER
"Patient's wife calling with patient on the line.    At this time, patient is asymptomatic.  He denies: SOB, lightheadedness, weakness, dizziness.  \"I feel the same as I did for the last 2-3 weeks.\"    Patient has an apt with Vanessa Cruz tomorrow.    Sherlyn Bledsoe RN, BSN  [facility]      "

## 2023-08-10 ENCOUNTER — OFFICE VISIT (OUTPATIENT)
Dept: FAMILY MEDICINE | Facility: CLINIC | Age: 53
End: 2023-08-10
Payer: COMMERCIAL

## 2023-08-10 ENCOUNTER — MYC MEDICAL ADVICE (OUTPATIENT)
Dept: FAMILY MEDICINE | Facility: CLINIC | Age: 53
End: 2023-08-10

## 2023-08-10 ENCOUNTER — DOCUMENTATION ONLY (OUTPATIENT)
Dept: GASTROENTEROLOGY | Facility: CLINIC | Age: 53
End: 2023-08-10

## 2023-08-10 VITALS
HEIGHT: 69 IN | DIASTOLIC BLOOD PRESSURE: 77 MMHG | HEART RATE: 104 BPM | OXYGEN SATURATION: 100 % | BODY MASS INDEX: 38.95 KG/M2 | TEMPERATURE: 98.7 F | RESPIRATION RATE: 20 BRPM | WEIGHT: 263 LBS | SYSTOLIC BLOOD PRESSURE: 124 MMHG

## 2023-08-10 DIAGNOSIS — I10 ESSENTIAL HYPERTENSION: ICD-10-CM

## 2023-08-10 DIAGNOSIS — K70.30 ALCOHOLIC CIRRHOSIS OF LIVER WITHOUT ASCITES (H): Primary | ICD-10-CM

## 2023-08-10 DIAGNOSIS — K76.6 PORTAL HYPERTENSIVE GASTROPATHY (H): ICD-10-CM

## 2023-08-10 DIAGNOSIS — K70.30 ALCOHOLIC CIRRHOSIS OF LIVER WITHOUT ASCITES (H): ICD-10-CM

## 2023-08-10 DIAGNOSIS — K31.89 PORTAL HYPERTENSIVE GASTROPATHY (H): ICD-10-CM

## 2023-08-10 DIAGNOSIS — D64.9 ANEMIA DUE TO UNKNOWN MECHANISM: Primary | ICD-10-CM

## 2023-08-10 LAB
ALBUMIN SERPL BCG-MCNC: 2.4 G/DL (ref 3.5–5.2)
ALP SERPL-CCNC: 235 U/L (ref 40–129)
ALT SERPL W P-5'-P-CCNC: 45 U/L (ref 0–70)
ANION GAP SERPL CALCULATED.3IONS-SCNC: 6 MMOL/L (ref 7–15)
AST SERPL W P-5'-P-CCNC: 105 U/L (ref 0–45)
BILIRUB SERPL-MCNC: 7.9 MG/DL
BUN SERPL-MCNC: 14.1 MG/DL (ref 6–20)
CALCIUM SERPL-MCNC: 8.6 MG/DL (ref 8.6–10)
CHLORIDE SERPL-SCNC: 101 MMOL/L (ref 98–107)
CREAT SERPL-MCNC: 0.88 MG/DL (ref 0.67–1.17)
DEPRECATED HCO3 PLAS-SCNC: 23 MMOL/L (ref 22–29)
ERYTHROCYTE [DISTWIDTH] IN BLOOD BY AUTOMATED COUNT: 16.3 % (ref 10–15)
FERRITIN SERPL-MCNC: 4611 NG/ML (ref 31–409)
GFR SERPL CREATININE-BSD FRML MDRD: >90 ML/MIN/1.73M2
GLUCOSE SERPL-MCNC: 143 MG/DL (ref 70–99)
HCT VFR BLD AUTO: 23.1 % (ref 40–53)
HGB BLD-MCNC: 7.8 G/DL (ref 13.3–17.7)
IRON BINDING CAPACITY (ROCHE): NORMAL
IRON SATN MFR SERPL: NORMAL %
IRON SERPL-MCNC: 118 UG/DL (ref 61–157)
MCH RBC QN AUTO: 33.3 PG (ref 26.5–33)
MCHC RBC AUTO-ENTMCNC: 33.8 G/DL (ref 31.5–36.5)
MCV RBC AUTO: 99 FL (ref 78–100)
PLATELET # BLD AUTO: 107 10E3/UL (ref 150–450)
POTASSIUM SERPL-SCNC: 5 MMOL/L (ref 3.4–5.3)
PROT SERPL-MCNC: 7.8 G/DL (ref 6.4–8.3)
RBC # BLD AUTO: 2.34 10E6/UL (ref 4.4–5.9)
SODIUM SERPL-SCNC: 130 MMOL/L (ref 136–145)
WBC # BLD AUTO: 12.3 10E3/UL (ref 4–11)

## 2023-08-10 PROCEDURE — 80053 COMPREHEN METABOLIC PANEL: CPT | Performed by: NURSE PRACTITIONER

## 2023-08-10 PROCEDURE — 36415 COLL VENOUS BLD VENIPUNCTURE: CPT | Performed by: NURSE PRACTITIONER

## 2023-08-10 PROCEDURE — 85027 COMPLETE CBC AUTOMATED: CPT | Performed by: NURSE PRACTITIONER

## 2023-08-10 PROCEDURE — 99214 OFFICE O/P EST MOD 30 MIN: CPT | Performed by: NURSE PRACTITIONER

## 2023-08-10 ASSESSMENT — PAIN SCALES - GENERAL: PAINLEVEL: NO PAIN (0)

## 2023-08-10 NOTE — PROGRESS NOTES
Mary is requesting pre visit labs 8/19 from Dr Dutton prior to her OV with her on 8/23.  Please place orders or contact pt with further info.

## 2023-08-10 NOTE — PROGRESS NOTES
SUBJECTIVE:   CC: Mary is an 52 year old who presents for preventative health visit.       8/10/2023    10:55 AM   Additional Questions   Roomed by Sandra ARRIETA   Accompanied by self       History of Present Illness       Hypertension: He presents for follow up of hypertension.  He does check blood pressure  regularly outside of the clinic. Outpatient blood pressures have not been over 140/90. He follows a low salt diet.     He eats 2-3 servings of fruits and vegetables daily.He consumes 2 sweetened beverage(s) daily.He exercises with enough effort to increase his heart rate 10 to 19 minutes per day.  He exercises with enough effort to increase his heart rate 5 days per week.   He is taking medications regularly.      Hypertension Follow-up    Do you check your blood pressure regularly outside of the clinic? Yes   Are you following a low salt diet? Yes  Are your blood pressures ever more than 140 on the top number (systolic) OR more   than 90 on the bottom number (diastolic), for example 140/90? No    Anemia   Hemoglobin dropped from last week of 9.2 to 7.8   Did note had episodes of nosebleeds two night last week   Felt like the blood was in the back his mouth and felt like had to swallow a decent amount  No rectal bleeding and upper endoscopy   Is on lactulose already and has loose but no blood and are not dark  Patient with hx of alcohol induced cirrhosis and has pending appointment with Gastroenterology in about 2 weeks   Here to follow up related to most recent follow up visit and was increase of lasix and spirolactone for fluid retention. Has had some good response to the diuretics but concern was for potential drop in his hemoglobin due to recent admission for anemia and was noted to have esophageal varices on endoscopy but none actively bleeding at time of scope       Social History     Tobacco Use    Smoking status: Never     Passive exposure: Never    Smokeless tobacco: Current     Types: Chew   Substance Use  Topics    Alcohol use: Yes     Alcohol/week: 12.0 standard drinks of alcohol     Types: 12 Standard drinks or equivalent per week     Comment: 12 drinsk per week             9/16/2022     8:47 AM   Alcohol Use   AUDIT SCORE  9       Last PSA:   Prostate Specific Antigen Screen   Date Value Ref Range Status   03/12/2022 1.41 0.00 - 4.00 ug/L Final       Reviewed orders with patient. Reviewed health maintenance and updated orders accordingly - Yes  Lab work is in process  Labs reviewed in EPIC  BP Readings from Last 3 Encounters:   08/10/23 124/77   08/03/23 124/79   07/25/23 92/59    Wt Readings from Last 3 Encounters:   08/10/23 119.3 kg (263 lb)   08/03/23 117.8 kg (259 lb 12.8 oz)   07/25/23 107.1 kg (236 lb 3.2 oz)                  Patient Active Problem List   Diagnosis    Essential hypertension    Tobacco abuse disorder    Alcohol use    IFG (impaired fasting glucose)    Mild hyperlipidemia    Elevated liver enzymes    Morbid obesity (H)    Alcoholic cirrhosis of liver without ascites (H)    Alcoholic hepatitis without ascites    Portal hypertensive gastropathy (H)    Splenomegaly    Secondary esophageal 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 (H)    Anemia due to unknown mechanism    Thrombocytopenia (H)    Persistent insomnia    Bilateral leg edema     Past Surgical History:   Procedure Laterality Date    CHOLECYSTECTOMY  unknown    done at [facility]       Social History     Tobacco Use    Smoking status: Never     Passive exposure: Never    Smokeless tobacco: Current     Types: Chew   Substance Use Topics    Alcohol use: Yes     Alcohol/week: 12.0 standard drinks of alcohol     Types: 12 Standard drinks or equivalent per week     Comment: 12 drinsk per week     Family History   Problem Relation Age of Onset    Chronic Obstructive Pulmonary Disease Mother     Lung Cancer Mother 81    Morbid Obesity Father     Diabetes Type 2  Brother          Current Outpatient Medications   Medication Sig Dispense Refill    doxepin  (SINEQUAN) 10 MG capsule Take 1 capsule (10 mg) by mouth At Bedtime 60 capsule 1    furosemide (LASIX) 20 MG tablet Take 1 tablet (20 mg) by mouth daily 30 tablet 0    lactulose (CHRONULAC) 10 GM/15ML solution TAKE 30 MLS BY MOUTH 2 TIMES DAILY 473 mL 1    melatonin 10 MG TABS tablet Take 1 tablet (10 mg) by mouth nightly as needed for sleep      multivitamin w/minerals (THERA-VIT-M) tablet Take 1 tablet by mouth daily 90 tablet 1    omeprazole (PRILOSEC) 20 MG DR capsule Take 1 capsule (20 mg) by mouth 2 times daily 180 capsule 1    permethrin (ELIMITE) 5 % external cream Apply cream from head to toe (except the face); leave on for 8-14 hours then wash off with water; reapply in 1 week if live mites appear. 60 g 1    spironolactone (ALDACTONE) 50 MG tablet Take 1 tablet (50 mg) by mouth daily 30 tablet 0    zinc oxide (DESITIN) 20 % external ointment Apply topically as needed for dry skin or irritation       Allergies   Allergen Reactions    Fish Swelling    Food      baked beans       Reviewed and updated as needed this visit by clinical staff                  Reviewed and updated as needed this visit by Provider                 Past Medical History:   Diagnosis Date    Alcohol use 03/11/2020    Alcoholic cirrhosis of liver without ascites (H) 07/13/2023    Alcoholic hepatitis without ascites 07/13/2023    Bilateral leg edema 07/25/2023    Concussion without loss of consciousness 03/11/2020    Elevated liver enzymes 05/03/2022    Essential hypertension 03/11/2020    IFG (impaired fasting glucose) 12/07/2021    Mild hyperlipidemia 12/07/2021    Other specified anemias 07/13/2023    Persistent insomnia 07/13/2023    Portal hypertension (H) 07/13/2023    Secondary esophageal 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 (H) 07/13/2023    Splenomegaly 07/13/2023    Thrombocytopenia (H) 07/13/2023    Tobacco abuse disorder 03/11/2020      Past Surgical History:   Procedure Laterality Date    CHOLECYSTECTOMY  unknown    [facility]       Review of Systems  CONSTITUTIONAL:POSITIVE  for weight loss and NEGATIVE  for chills, sweats, and weight gain  ENT/MOUTH: POSITIVE for epistaxis and NEGATIVE for fever and nasal congestion  RESP:NEGATIVE for significant cough or SOB  CV: POSITIVE for lower extremity edema and NEGATIVE for chest pain/chest pressure, diaphoresis, and palpitations  GI: POSITIVE for distended abdomen  and NEGATIVE for jaundice, melena, nausea, and vomiting  MUSCULOSKELETAL: NEGATIVE for significant arthralgias or myalgia  NEURO: NEGATIVE for weakness, dizziness or paresthesias  HEME/ALLERGY/IMMUNE: POSITIVE  for anemia and NEGATIVE for night sweats        OBJECTIVE:   /77 (BP Location: Right arm, Patient Position: Sitting, Cuff Size: Adult Large)   Pulse 104   Temp 98.7  F (37.1  C) (Oral)   Resp 20   Ht 1.76 m (5' 9.3\")   Wt 119.3 kg (263 lb)   SpO2 100%   BMI 38.50 kg/m    Wt Readings from Last 4 Encounters:   08/10/23 119.3 kg (263 lb)   08/03/23 117.8 kg (259 lb 12.8 oz)   07/25/23 107.1 kg (236 lb 3.2 oz)   06/25/23 103.4 kg (228 lb)     BP Readings from Last 3 Encounters:   08/10/23 124/77   08/03/23 124/79   07/25/23 92/59         Physical Exam  GENERAL: Patient is well nourished, well developed, well groomed and in no acute distress, obese,in no apparent distress, non-toxic, in no respiratory distress and acyanotic, alert, cooperative, and well hydrated  EYES: Eyes grossly normal to inspection and conjunctivae and sclerae normal  HENT:ear canals and TM's normal and nose and mouth without ulcers or lesions   NECK:normal and supple  CARDIAC:regular rates and rhythm, no murmur, click or rub, no irregular beats, and pitting B/L LE edema to ankle   RESP: normal respiratory rate and rhythm, lungs clear to auscultation  unlabored respirations, no intercostal retractions or accessory muscle use  ABD:soft, nontender and distended   SKIN: Skin color, texture, turgor normal. No rashes or lesions.  MS: " extremities normal- no gross deformities noted, gait normal, and normal muscle tone  NEURO: Normal strength and tone, sensory exam grossly normal, mentation intact, and speech normal  PSYCH: Alert, oriented, thought content appropriate,mentation appears normal., affect and mood normal        Diagnostic Test Results:  Labs reviewed in Epic  Results for orders placed or performed in visit on 08/09/23 (from the past 24 hour(s))   Basic metabolic panel   Result Value Ref Range    Sodium 129 (L) 136 - 145 mmol/L    Potassium 4.6 3.4 - 5.3 mmol/L    Chloride 101 98 - 107 mmol/L    Carbon Dioxide (CO2) 23 22 - 29 mmol/L    Anion Gap 5 (L) 7 - 15 mmol/L    Urea Nitrogen 13.4 6.0 - 20.0 mg/dL    Creatinine 0.83 0.67 - 1.17 mg/dL    Calcium 8.5 (L) 8.6 - 10.0 mg/dL    Glucose 133 (H) 70 - 99 mg/dL    GFR Estimate >90 >60 mL/min/1.73m2   CBC with platelets   Result Value Ref Range    WBC Count 12.8 (H) 4.0 - 11.0 10e3/uL    RBC Count 2.37 (L) 4.40 - 5.90 10e6/uL    Hemoglobin 7.7 (LL) 13.3 - 17.7 g/dL    Hematocrit 23.1 (L) 40.0 - 53.0 %    MCV 98 78 - 100 fL    MCH 32.5 26.5 - 33.0 pg    MCHC 33.3 31.5 - 36.5 g/dL    RDW 16.3 (H) 10.0 - 15.0 %    Platelet Count 109 (L) 150 - 450 10e3/uL   Ferritin   Result Value Ref Range    Ferritin 4,611 (H) 31 - 409 ng/mL   Iron & Iron Binding Capacity   Result Value Ref Range    Iron 118 61 - 157 ug/dL    Iron Binding Capacity      Iron Sat Index         ASSESSMENT/PLAN:     Mary was seen today for physical.    Diagnoses and all orders for this visit:    Anemia due to unknown mechanism  -     CBC with platelets; Future  -     Ferritin; Future  -     Iron & Iron Binding Capacity; Future  -worsening anemia again will recommend return to ER     Alcoholic cirrhosis of liver without ascites (H)  -     Comprehensive metabolic panel; Future  -  continues to obstain from ETOH   FOLLOW UP WITH SPECIALIST :Gastroenterology    Portal hypertensive gastropathy (H)  -  appointment scheduled with  "hepatology on August 1 23  -     Comprehensive metabolic panel    Essential hypertension  -    Continue current medications as prescribed.   -     Comprehensive metabolic panel      PLAN:   With patient decrease of hemoglobin from 9.2 to 7.8 was recommended yesterday to go to ER for concern of bleeding   Hemoglobin rechecked today and still in 7.7 and was again recommended to return to ER as will likely need transfusion again and potentially colonoscopy which was not done at last admission.       BMI:   Estimated body mass index is 38.37 kg/m  as calculated from the following:    Height as of 8/3/23: 1.753 m (5' 9\").    Weight as of 8/3/23: 117.8 kg (259 lb 12.8 oz).         He reports that he has never smoked. He has never been exposed to tobacco smoke. His smokeless tobacco use includes chew.            LESA Hines CNP  [facility]  "

## 2023-08-10 NOTE — RESULT ENCOUNTER NOTE
Hal Lopez,    Attached are your test results.  -Hemoglobin is decreased indicating anemia.  Anemia can cause fatigue and, occasionally, light-headedness.  Need to go into ER concern for bleeding   -elevated white  blood cell and platelet counts are decreased but not changed    Please contact us if you have any questions.    Vanessa Cruz, CNP

## 2023-08-11 ENCOUNTER — TELEPHONE (OUTPATIENT)
Dept: FAMILY MEDICINE | Facility: CLINIC | Age: 53
End: 2023-08-11
Payer: COMMERCIAL

## 2023-08-11 NOTE — TELEPHONE ENCOUNTER
Forms/Letter Request    Type of form/letter:  [facility]    Have you been seen for this request: N/A    Do we have the form/letter: Yes: Will place on providers desk for review/signature    When is form/letter needed by: asap    How would you like the form/letter returned: Fax : [phone]

## 2023-08-14 NOTE — TELEPHONE ENCOUNTER
Can we put him in for virtual for tomorrow afternoon for ER follow up and I can place the referral as well so will give us time to talk

## 2023-08-14 NOTE — TELEPHONE ENCOUNTER
Pt's wife called wanting to speak with provider she would like a call at [phone] Pt was at ER today for possible transfusion doctors think it's a production issue not due to blood loss would like a referral to hematologist. Pt's wife would like to discuss this matter.         [employer] [employer] Visit Facilitator

## 2023-08-15 ENCOUNTER — VIRTUAL VISIT (OUTPATIENT)
Dept: FAMILY MEDICINE | Facility: CLINIC | Age: 53
End: 2023-08-15
Payer: COMMERCIAL

## 2023-08-15 DIAGNOSIS — K76.6 PORTAL HYPERTENSIVE GASTROPATHY (H): ICD-10-CM

## 2023-08-15 DIAGNOSIS — R04.0 EPISTAXIS: ICD-10-CM

## 2023-08-15 DIAGNOSIS — M62.830 BACK MUSCLE SPASM: ICD-10-CM

## 2023-08-15 DIAGNOSIS — I10 ESSENTIAL HYPERTENSION: ICD-10-CM

## 2023-08-15 DIAGNOSIS — K31.89 PORTAL HYPERTENSIVE GASTROPATHY (H): ICD-10-CM

## 2023-08-15 DIAGNOSIS — K70.30 ALCOHOLIC CIRRHOSIS OF LIVER WITHOUT ASCITES (H): Primary | ICD-10-CM

## 2023-08-15 DIAGNOSIS — I85.10 SECONDARY ESOPHAGEAL VARICES WITHOUT BLEEDING (H): ICD-10-CM

## 2023-08-15 DIAGNOSIS — D64.9 ANEMIA DUE TO UNKNOWN MECHANISM: ICD-10-CM

## 2023-08-15 PROCEDURE — 99214 OFFICE O/P EST MOD 30 MIN: CPT | Mod: VID | Performed by: NURSE PRACTITIONER

## 2023-08-15 RX ORDER — FUROSEMIDE 20 MG
40 TABLET ORAL DAILY
Qty: 60 TABLET | Refills: 0 | Status: SHIPPED | OUTPATIENT
Start: 2023-08-15 | End: 2023-09-14

## 2023-08-15 RX ORDER — CYCLOBENZAPRINE HCL 10 MG
10 TABLET ORAL 3 TIMES DAILY PRN
Qty: 30 TABLET | Refills: 1 | Status: SHIPPED | OUTPATIENT
Start: 2023-08-15 | End: 2023-11-15

## 2023-08-15 RX ORDER — SPIRONOLACTONE 50 MG/1
100 TABLET, FILM COATED ORAL DAILY
Qty: 60 TABLET | Refills: 0 | Status: SHIPPED | OUTPATIENT
Start: 2023-08-15 | End: 2023-09-14

## 2023-08-15 NOTE — PROGRESS NOTES
Mary is a 52 year old who is being evaluated via a billable video visit.      How would you like to obtain your AVS? MyChart  If the video visit is dropped, the invitation should be resent by: Text to cell phone: [phone]  Will anyone else be joining your video visit? No        Subjective   Mary is a 52 year old, presenting for the following health issues:  Form Request and Refill Request      8/15/2023    11:19 AM   Additional Questions   Roomed by Neris       History of Present Illness       Hypertension: He presents for follow up of hypertension.  He does check blood pressure  regularly outside of the clinic. Outpatient blood pressures have not been over 140/90. He follows a low salt diet.     He eats 2-3 servings of fruits and vegetables daily.He consumes 2 sweetened beverage(s) daily.He exercises with enough effort to increase his heart rate 10 to 19 minutes per day.  He exercises with enough effort to increase his heart rate 5 days per week.   He is taking medications regularly.       ED/UC Followup:    Facility:  [facility]   Date of visit: 8/14/2023  Reason for visit: anemia   Current Status: stable     MDM:  Mary Lopez is a 52 y.o. male presents due to outpatient lab check with a low hemoglobin. He is already had an EGD. I reviewed a prior hospitalization where he has a mixed anemia. Patient's hemoglobin today is 7.2. He is essentially asymptomatic. I discussed with him transfusion the emergency department with subsequent discharge versus following with primary care get repetitive hemoglobins with outpatient transfusions as treatment options. Given his lack of symptoms and his likely need for repetitive blood transfusions he does not feel he needs one today. I think this is a reasonable approach. I did offer to perform a transfusion in the emergency department for him today. I did discuss with him the potential side effects of transfusion. Both he and his wife agree that given he is  asymptomatic at this point holding off seems reasonable. He is supposed to get an outpatient colonoscopy which I encouraged him to continue. I also discussed with him calling his primary care clinic today regarding his low hemoglobin for serial checks and potential repetitive outpatient transfusions on a as needed basis. He also may benefit from seeing a hematologist. He discussed with me some sleep disturbance which we will refer him back to his primary care doctor regarding. He was encouraged to return with the development of any symptoms including but not limited to shortness of breath, lightheadedness, near syncope or other new symptomatology. They are comfortable this plan.    Complex medical decision making was used in the evaluation of patient including the risk benefits and assessed as above as well as extensive time spent at the bedside in discussion and extensive chart review.    DIAGNOSIS:  ICD-10-CM  1. Anemia, unspecified type D64.9    2. Hyponatremia E87.1    3. Hyperglycemia R73.9    4. Thrombocytopenia (HCC) D69.6    5. Coagulopathy (HCC) D68.9    Has appointment with hepatologist next Wednesday   Was in ER yesterday but he was not having symptoms so they opted to wait on the transfusion after discussion with ER provider   No shortness of breath at this time   They did discuss may need periodic transfusion in the future   Was discussion in the ER that would proceed with colonoscopy as planned at previous ER visit   Also having intermittent major nose bleeds which can be pretty heavy at times   Is taking iron supplements regularly   Has appointment for labs on Thursday ordered by hepatology for prior to his appointment   Forms need to be completed for him as he is unable to work at this time   Weight has been dropping and his BP was good in the ER as well.     Having back pain and muscle spasms at night and would like to have some flexeril as has had in the past and done OK with this     Continuing to  have issue with sleep. Goes to bed at 9:30 and will wake up at midnight    Takes Doxepin at night intermittently   Has tried Trazodone in the past and that has not helped       Labs reviewed in EPIC  BP Readings from Last 3 Encounters:   08/10/23 124/77   08/03/23 124/79   07/25/23 92/59    Wt Readings from Last 3 Encounters:   08/10/23 119.3 kg (263 lb)   08/03/23 117.8 kg (259 lb 12.8 oz)   07/25/23 107.1 kg (236 lb 3.2 oz)                  Patient Active Problem List   Diagnosis    Essential hypertension    Tobacco abuse disorder    Alcohol use    IFG (impaired fasting glucose)    Mild hyperlipidemia    Elevated liver enzymes    Morbid obesity (H)    Alcoholic cirrhosis of liver without ascites (H)    Alcoholic hepatitis without ascites    Portal hypertensive gastropathy (H)    Splenomegaly    Secondary esophageal 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 (H)    Anemia due to unknown mechanism    Thrombocytopenia (H)    Persistent insomnia    Bilateral leg edema     Past Surgical History:   Procedure Laterality Date    CHOLECYSTECTOMY  unknown    done at [facility]       Social History     Tobacco Use    Smoking status: Never     Passive exposure: Never    Smokeless tobacco: Current     Types: Chew   Substance Use Topics    Alcohol use: Yes     Alcohol/week: 12.0 standard drinks of alcohol     Types: 12 Standard drinks or equivalent per week     Comment: 12 drinsk per week     Family History   Problem Relation Age of Onset    Chronic Obstructive Pulmonary Disease Mother     Lung Cancer Mother 81    Morbid Obesity Father     Diabetes Type 2  Brother          Current Outpatient Medications   Medication Sig Dispense Refill    doxepin (SINEQUAN) 10 MG capsule Take 1 capsule (10 mg) by mouth At Bedtime 60 capsule 1    furosemide (LASIX) 20 MG tablet Take 2 tablets (40 mg) by mouth daily 60 tablet 0    lactulose (CHRONULAC) 10 GM/15ML solution TAKE 30 MLS BY MOUTH 2 TIMES DAILY 473 mL 1    melatonin 10 MG TABS tablet Take 1 tablet  (10 mg) by mouth nightly as needed for sleep      multivitamin w/minerals (THERA-VIT-M) tablet Take 1 tablet by mouth daily 90 tablet 1    omeprazole (PRILOSEC) 20 MG DR capsule Take 1 capsule (20 mg) by mouth 2 times daily 180 capsule 1    permethrin (ELIMITE) 5 % external cream Apply cream from head to toe (except the face); leave on for 8-14 hours then wash off with water; reapply in 1 week if live mites appear. 60 g 1    zinc oxide (DESITIN) 20 % external ointment Apply topically as needed for dry skin or irritation      spironolactone (ALDACTONE) 50 MG tablet Take 2 tablets (100 mg) by mouth daily 60 tablet 0     Allergies   Allergen Reactions    Fish Swelling    Food      baked beans           Review of Systems   Constitutional, HEENT, cardiovascular, pulmonary, GI, , musculoskeletal, neuro, skin, endocrine and psych systems are negative, except as otherwise noted.      Objective           Vitals:  No vitals were obtained today due to virtual visit.    Physical Exam   GENERAL: alert and no distress  EYES: Eyes grossly normal to inspection and conjunctivae and sclerae normal  HENT: normal cephalic/atraumatic and oral mucous membranes moist  RESP: No audible wheeze, cough, or visible cyanosis.  No visible retractions or increased work of breathing.    Abdomen distended but not painful   MS: bilateral  edema to ankles   SKIN: Visible skin clear. No significant rash, abnormal pigmentation or lesions.  NEURO: mentation intact  PSYCH: Mentation appears normal, affect normal/bright, judgement and insight intact, normal speech and appearance well-groomed.  8/14/2023  WBC 4.3 - 10.8 K/uL 11.8 High    RBC 4.60 - 6.20 M/uL 2.14 Low    HEMOGLOBIN 14.0 - 18.0 gm/dL 7.2 Low    HEMATOCRIT 40.0 - 54.0 % 20.9 Low    MCV 80 - 100 fL 98   MCH 27 - 33 pg 34 High    MCHC 33 - 36 gm/dL 34   RDW 11.5 - 14.5 % 16.9 High    PLATELET COUNT 150 - 400 K/ Low      Assessment & Plan     Alcoholic cirrhosis of liver without ascites  "(H)  Keep appointment as planned with hepatology next week   Continue diuretics as is helping with the edema   - Adult ENT  Referral  Forms completed for he and his wife for FMLA due to illness     Anemia due to unknown mechanism  Will plan for colonoscopy and concern likely related to his hepatic disease   Will refer to ENT to deal with epistaxis   - Adult Oncology/Hematology  Referral  - Adult ENT  Referral    Essential hypertension  Continue current medications as prescribed.     Secondary esophageal 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 (H)  - Adult Oncology/Hematology  Referral    Portal hypertensive gastropathy (H)  FOLLOW UP WITH SPECIALIST :Hepatology   - Adult Oncology/Hematology  Referral    Epistaxis  - Adult ENT  Referral    Back muscle spasm  Will add muscle relaxant for back spasms   - cyclobenzaprine (FLEXERIL) 10 MG tablet  Dispense: 30 tablet; Refill: 1      Ordering of each unique test  Prescription drug management  No LOS data to display   Time spent by me doing chart review, history and exam, documentation and further activities per the note       BMI:   Estimated body mass index is 38.5 kg/m  as calculated from the following:    Height as of 8/10/23: 1.76 m (5' 9.3\").    Weight as of 8/10/23: 119.3 kg (263 lb).       See Patient Instructions  Patient Instructions   PLAN:   1.   Symptomatic therapy suggested: will try flexeril as needed for muscle spasms.  2.  Orders Placed This Encounter   Medications    furosemide (LASIX) 20 MG tablet     Sig: Take 2 tablets (40 mg) by mouth daily     Dispense:  60 tablet     Refill:  0    spironolactone (ALDACTONE) 50 MG tablet     Sig: Take 2 tablets (100 mg) by mouth daily     Dispense:  60 tablet     Refill:  0    cyclobenzaprine (FLEXERIL) 10 MG tablet     Sig: Take 1 tablet (10 mg) by mouth 3 times daily as needed for muscle spasms     Dispense:  30 tablet     Refill:  1     Orders Placed This Encounter "   Procedures    Adult Oncology/Hematology  Referral    Adult ENT  Referral       3. Will follow up and/or notify patient of  results via My Chart to determine further need for followup  CONSULTATION/REFERRAL to ENT and hematology   Appointment as planned with hepatology    Patient needs to follow up in if no improvement,or sooner if worsening of symptoms or other symptoms develop.          LESA Hines [facility]          Video-Visit Details    Type of service:  Video Visit     Originating Location (pt. Location): Home    Distant Location (provider location):  On-site  Platform used for Video Visit: Yenifer

## 2023-08-15 NOTE — LETTER
August 15, 2023      Mary Lopez  [address]  [place] [address]        To Whom It May Concern,     Mary Lopez has been off of work since 7/25/2023. Due to health concerns it is not expected that he will be able to return to work on 8/28/2023 as originally planned. He has multiple specialist appointment pending and increasing medical concerns that would likely need him to extend his leave  through October 2, 2023. Will be able to reassess the situation at that time. Thank you for you consideration.         Sincerely,        LESA Hines CNP

## 2023-08-15 NOTE — PATIENT INSTRUCTIONS
PLAN:   1.   Symptomatic therapy suggested: will try flexeril as needed for muscle spasms.  2.  Orders Placed This Encounter   Medications    furosemide (LASIX) 20 MG tablet     Sig: Take 2 tablets (40 mg) by mouth daily     Dispense:  60 tablet     Refill:  0    spironolactone (ALDACTONE) 50 MG tablet     Sig: Take 2 tablets (100 mg) by mouth daily     Dispense:  60 tablet     Refill:  0    cyclobenzaprine (FLEXERIL) 10 MG tablet     Sig: Take 1 tablet (10 mg) by mouth 3 times daily as needed for muscle spasms     Dispense:  30 tablet     Refill:  1     Orders Placed This Encounter   Procedures    Adult Oncology/Hematology  Referral    Adult ENT  Referral       3. Will follow up and/or notify patient of  results via My Chart to determine further need for followup  CONSULTATION/REFERRAL to ENT and hematology   Appointment as planned with hepatology    Patient needs to follow up in if no improvement,or sooner if worsening of symptoms or other symptoms develop.

## 2023-08-16 ENCOUNTER — PATIENT OUTREACH (OUTPATIENT)
Dept: ONCOLOGY | Facility: CLINIC | Age: 53
End: 2023-08-16
Payer: COMMERCIAL

## 2023-08-16 ENCOUNTER — TELEPHONE (OUTPATIENT)
Dept: OTOLARYNGOLOGY | Facility: CLINIC | Age: 53
End: 2023-08-16
Payer: COMMERCIAL

## 2023-08-16 NOTE — TELEPHONE ENCOUNTER
RECORDS STATUS - ALL OTHER DIAGNOSIS      RECORDS RECEIVED FROM: [facility]   DATE RECEIVED: 8/16   ` STATUS DETAILS   OFFICE NOTE from referring provider Vanessa Adamson APRN CNP: 8/14/23    DISCHARGE SUMMARY from hospital [facility] 7/26/23, 6/26/23   DISCHARGE REPORT from the ER [facility] 8/14/23   OPERATIVE REPORT [facility] 8/3/23   MEDICATION LIST [facility] 8/15/23   LABS     ANYTHING RELATED TO DIAGNOSIS Epic/ 8/14/23

## 2023-08-16 NOTE — PROGRESS NOTES
Hematology referral reviewed for [facility], see below.    Referral reason: anemia in setting of cirrhosis, was in ED for critical Hgb, platelets and WBC also with abnormalities. Will see GI on 8/23, patient reported to ED provider that he is not symptomatic    Current abnormal labs: Available in CareEverywhere and Available in Chart Review    Outreach: [employer] sent to patient    Plan: Triage instructions updated and sent to NPS for completion, VM left for patient and [employer] message sent to patient.

## 2023-08-16 NOTE — TELEPHONE ENCOUNTER
[facility] Call Center    Phone Message    May a detailed message be left on voicemail: yes     Reason for Call: Other: The pt has a Priority 1-2 wks referral for Epistaxis. Please review referral-med recs to see if you can bring him in sooner. He is on the wait list. Thanks.     Action Taken: Message routed to:  Other: [facility]    Travel Screening: Not Applicable

## 2023-08-17 ENCOUNTER — LAB (OUTPATIENT)
Dept: LAB | Facility: CLINIC | Age: 53
End: 2023-08-17
Payer: COMMERCIAL

## 2023-08-17 ENCOUNTER — TELEPHONE (OUTPATIENT)
Dept: FAMILY MEDICINE | Facility: CLINIC | Age: 53
End: 2023-08-17

## 2023-08-17 ENCOUNTER — MYC MEDICAL ADVICE (OUTPATIENT)
Dept: FAMILY MEDICINE | Facility: CLINIC | Age: 53
End: 2023-08-17

## 2023-08-17 DIAGNOSIS — D64.9 ANEMIA DUE TO UNKNOWN MECHANISM: ICD-10-CM

## 2023-08-17 DIAGNOSIS — K70.30 ALCOHOLIC CIRRHOSIS OF LIVER WITHOUT ASCITES (H): ICD-10-CM

## 2023-08-17 LAB
ALBUMIN SERPL BCG-MCNC: 2.1 G/DL (ref 3.5–5.2)
ALP SERPL-CCNC: 270 U/L (ref 40–129)
ALT SERPL W P-5'-P-CCNC: 42 U/L (ref 0–70)
AST SERPL W P-5'-P-CCNC: 90 U/L (ref 0–45)
BILIRUB DIRECT SERPL-MCNC: 3.52 MG/DL (ref 0–0.3)
BILIRUB SERPL-MCNC: 6.3 MG/DL
ERYTHROCYTE [DISTWIDTH] IN BLOOD BY AUTOMATED COUNT: 17.9 % (ref 10–15)
HCT VFR BLD AUTO: 21.6 % (ref 40–53)
HGB BLD-MCNC: 7.2 G/DL (ref 13.3–17.7)
INR PPP: 3.29 (ref 0.85–1.15)
MCH RBC QN AUTO: 32.9 PG (ref 26.5–33)
MCHC RBC AUTO-ENTMCNC: 33.3 G/DL (ref 31.5–36.5)
MCV RBC AUTO: 99 FL (ref 78–100)
PLATELET # BLD AUTO: 115 10E3/UL (ref 150–450)
PROT SERPL-MCNC: 7.3 G/DL (ref 6.4–8.3)
RBC # BLD AUTO: 2.19 10E6/UL (ref 4.4–5.9)
WBC # BLD AUTO: 12.8 10E3/UL (ref 4–11)

## 2023-08-17 PROCEDURE — 85610 PROTHROMBIN TIME: CPT

## 2023-08-17 PROCEDURE — 99000 SPECIMEN HANDLING OFFICE-LAB: CPT

## 2023-08-17 PROCEDURE — 85027 COMPLETE CBC AUTOMATED: CPT

## 2023-08-17 PROCEDURE — 85045 AUTOMATED RETICULOCYTE COUNT: CPT

## 2023-08-17 PROCEDURE — 36415 COLL VENOUS BLD VENIPUNCTURE: CPT

## 2023-08-17 PROCEDURE — 83615 LACTATE (LD) (LDH) ENZYME: CPT

## 2023-08-17 PROCEDURE — 83010 ASSAY OF HAPTOGLOBIN QUANT: CPT | Performed by: INTERNAL MEDICINE

## 2023-08-17 PROCEDURE — 84238 ASSAY NONENDOCRINE RECEPTOR: CPT | Mod: 90

## 2023-08-17 PROCEDURE — 86706 HEP B SURFACE ANTIBODY: CPT

## 2023-08-17 PROCEDURE — 80076 HEPATIC FUNCTION PANEL: CPT

## 2023-08-17 NOTE — TELEPHONE ENCOUNTER
Forms/Letter Request    Type of form/letter:  [facility]    Have you been seen for this request: No    Do we have the form/letter: Yes:     How would you like the form/letter returned: Fax : [phone]

## 2023-08-17 NOTE — TELEPHONE ENCOUNTER
Forms were faxed to [employer] and [employer]. Forms were also forwarded to pt per req.  Kiera Aguilera CMA

## 2023-08-17 NOTE — TELEPHONE ENCOUNTER
After chart review it was determined a sooner appt was indicated and one was made    JESSIE Hankins RN 8/17/2023 11:02 AM

## 2023-08-18 ENCOUNTER — ONCOLOGY VISIT (OUTPATIENT)
Dept: ONCOLOGY | Facility: HOSPITAL | Age: 53
End: 2023-08-18
Attending: NURSE PRACTITIONER
Payer: COMMERCIAL

## 2023-08-18 ENCOUNTER — PRE VISIT (OUTPATIENT)
Dept: ONCOLOGY | Facility: HOSPITAL | Age: 53
End: 2023-08-18
Payer: COMMERCIAL

## 2023-08-18 ENCOUNTER — APPOINTMENT (OUTPATIENT)
Dept: INFUSION THERAPY | Facility: HOSPITAL | Age: 53
End: 2023-08-18
Payer: COMMERCIAL

## 2023-08-18 VITALS
HEART RATE: 100 BPM | HEIGHT: 69 IN | WEIGHT: 249.5 LBS | BODY MASS INDEX: 36.95 KG/M2 | RESPIRATION RATE: 18 BRPM | TEMPERATURE: 99 F | SYSTOLIC BLOOD PRESSURE: 121 MMHG | OXYGEN SATURATION: 97 % | DIASTOLIC BLOOD PRESSURE: 60 MMHG

## 2023-08-18 DIAGNOSIS — K76.6 PORTAL HYPERTENSIVE GASTROPATHY (H): ICD-10-CM

## 2023-08-18 DIAGNOSIS — D64.9 ANEMIA DUE TO UNKNOWN MECHANISM: ICD-10-CM

## 2023-08-18 DIAGNOSIS — D64.9 ANEMIA, UNSPECIFIED TYPE: Primary | ICD-10-CM

## 2023-08-18 DIAGNOSIS — I85.10 SECONDARY ESOPHAGEAL VARICES WITHOUT BLEEDING (H): ICD-10-CM

## 2023-08-18 DIAGNOSIS — K31.89 PORTAL HYPERTENSIVE GASTROPATHY (H): ICD-10-CM

## 2023-08-18 LAB
HBV SURFACE AB SERPL IA-ACNC: 2.19 M[IU]/ML
HBV SURFACE AB SERPL IA-ACNC: NONREACTIVE M[IU]/ML
LDH SERPL L TO P-CCNC: 277 U/L (ref 0–250)
RETICS # AUTO: 0.12 10E6/UL (ref 0.03–0.1)
RETICS/RBC NFR AUTO: 5.5 % (ref 0.5–2)

## 2023-08-18 PROCEDURE — 99205 OFFICE O/P NEW HI 60 MIN: CPT | Performed by: INTERNAL MEDICINE

## 2023-08-18 PROCEDURE — G0463 HOSPITAL OUTPT CLINIC VISIT: HCPCS | Performed by: INTERNAL MEDICINE

## 2023-08-18 NOTE — Clinical Note
"    8/18/2023         RE: Mary Lopez  [address] [place] [address]        Dear Colleague,    Thank you for referring your patient, Mary Lopez, to the [facility]. Please see a copy of my visit note below.    Oncology Rooming Note    August 18, 2023 3:26 PM   Mary Lopez is a 52 year old male who presents for:    No chief complaint on file.    Initial Vitals: /60 (BP Location: Left arm, Patient Position: Sitting, Cuff Size: Adult Regular)   Pulse 100   Temp 99  F (37.2  C)   Resp 18   Ht 1.755 m (5' 9.09\")   Wt 113.2 kg (249 lb 8 oz)   SpO2 97%   BMI 36.74 kg/m   Estimated body mass index is 36.74 kg/m  as calculated from the following:    Height as of this encounter: 1.755 m (5' 9.09\").    Weight as of this encounter: 113.2 kg (249 lb 8 oz). Body surface area is 2.35 meters squared.  Data Unavailable Comment: Data Unavailable   No LMP for male patient.  Allergies reviewed: Yes  Medications reviewed: Yes    Medications: Medication refills not needed today.  Pharmacy name entered into [facility]:    [facility] - [place], [place] - [address] [id number]  [place], [place] - [address]    Clinical concerns: consult  Dr. Munoz was NOT notified.      Melonie Blunt RN                 Again, thank you for allowing me to participate in the care of your patient.        Sincerely,        Jose Munoz MD  "

## 2023-08-18 NOTE — PROGRESS NOTES
"Oncology Rooming Note    August 18, 2023 3:26 PM   Mary Lopez is a 52 year old male who presents for:    No chief complaint on file.    Initial Vitals: /60 (BP Location: Left arm, Patient Position: Sitting, Cuff Size: Adult Regular)   Pulse 100   Temp 99  F (37.2  C)   Resp 18   Ht 1.755 m (5' 9.09\")   Wt 113.2 kg (249 lb 8 oz)   SpO2 97%   BMI 36.74 kg/m   Estimated body mass index is 36.74 kg/m  as calculated from the following:    Height as of this encounter: 1.755 m (5' 9.09\").    Weight as of this encounter: 113.2 kg (249 lb 8 oz). Body surface area is 2.35 meters squared.  Data Unavailable Comment: Data Unavailable   No LMP for male patient.  Allergies reviewed: Yes  Medications reviewed: Yes    Medications: Medication refills not needed today.  Pharmacy name entered into [facility]:    [facility] [place]  [place] - [address] [id number] [place], [place] - [address] AT [address]    Clinical concerns: consult  Dr. Munoz was NOT notified.      Melonie Blunt RN             "

## 2023-08-19 LAB — STFR SERPL-MCNC: 2.5 MG/L

## 2023-08-21 ENCOUNTER — MYC MEDICAL ADVICE (OUTPATIENT)
Dept: OTOLARYNGOLOGY | Facility: CLINIC | Age: 53
End: 2023-08-21

## 2023-08-21 ENCOUNTER — OFFICE VISIT (OUTPATIENT)
Dept: OTOLARYNGOLOGY | Facility: CLINIC | Age: 53
End: 2023-08-21
Payer: COMMERCIAL

## 2023-08-21 VITALS — HEART RATE: 103 BPM | OXYGEN SATURATION: 99 % | DIASTOLIC BLOOD PRESSURE: 73 MMHG | SYSTOLIC BLOOD PRESSURE: 112 MMHG

## 2023-08-21 DIAGNOSIS — D64.9 ANEMIA DUE TO UNKNOWN MECHANISM: ICD-10-CM

## 2023-08-21 DIAGNOSIS — R04.0 EPISTAXIS: ICD-10-CM

## 2023-08-21 DIAGNOSIS — K70.30 ALCOHOLIC CIRRHOSIS OF LIVER WITHOUT ASCITES (H): ICD-10-CM

## 2023-08-21 LAB — HAPTOGLOB SERPL-MCNC: <3 MG/DL (ref 32–197)

## 2023-08-21 PROCEDURE — 30901 CONTROL OF NOSEBLEED: CPT | Performed by: OTOLARYNGOLOGY

## 2023-08-21 NOTE — PROGRESS NOTES
History of Present Illness - Mary Lopez is a 52 year old male here to see me for the first time for nose bleeds.    He tells me that he has had a lifelong history of nosebleeds, but they were only about monthly.  But recently it has increased considerably.  They can happen any time, without any trauma or blowing.  He will even get them just bending down or picking up heavy objects.    He does have very low platelets due to chronic liver failure.  No issues with prolonged bleeding following dental work or minor procedures.  There was no preceding changes in nasal health, nasal airway, change in vision, or new onset facial pain or headaches.      Past medical history -   Patient Active Problem List   Diagnosis    Essential hypertension    Tobacco abuse disorder    Alcohol use    IFG (impaired fasting glucose)    Mild hyperlipidemia    Elevated liver enzymes    Morbid obesity (H)    Alcoholic cirrhosis of liver without ascites (H)    Alcoholic hepatitis without ascites    Portal hypertensive gastropathy (H)    Splenomegaly    Secondary esophageal 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 (H)    Anemia due to unknown mechanism    Thrombocytopenia (H)    Persistent insomnia    Bilateral leg edema       /73   Pulse 103   SpO2 99%       General - The patient is well nourished and well developed, and appears to have good nutritional status.  Alert and oriented to person and place, answers questions and cooperates with examination appropriately.   Head and Face - Normocephalic and atraumatic, with no gross asymmetry noted of the contour of the facial features.  The facial nerve is intact, with strong symmetric movements.  Eyes - Extraocular movements intact, and the pupils were reactive to light.  Sclera were not icteric or injected, conjunctiva were pink and moist.  Mouth - Examination of the oral cavity shows pink, healthy, moist mucosa.  No lesions or ulceration noted.  The dentition are in good repair.  The tongue is  mobile and midline.    Nasal Cautery - Options were explained to the patient regarding conservative measures versus nasal cautery in the clinic today.  The patient wished to proceed with cautery.  I placed a small piece of cotton soaked in 4% liquid lidocaine in the anterior nasal cavity over the area of prominent vessels, one spot on the RIGHT, and two on the LEFT .  This was left in place for 10 minutes.  I then proceeded to remove the cotton, and applied silver nitrate to the vessels, starting distally, and working my way back to the vessels  point of entry onto the nasal mucosa.  After completion, I placed a small piece of Surgicel over the area that was cauterized.  The patient tolerated the procedure well.      A/P - Mary Lopez  (K70.30) Alcoholic cirrhosis of liver without ascites (H)  (D64.9) Anemia due to unknown mechanism  (R04.0) Epistaxis    The patient has been cauterized today for epistaxis.  I counseled them on keeping the head above the level of the heart at all times for the next week.  No picking or rubbing at the cauterized side of the nose, or blowing for 1 week.  The patient was counseled that in the event of another recurrence of bleeding, to call into my direct scheduling line so I can see them with 24 hours.

## 2023-08-21 NOTE — PATIENT INSTRUCTIONS
[employer] is for any [facility] looking for free support to quit smoking, vaping or chewing.   [employer] will offer many quit support options and resources so [place] residents can continue to find the way to quit that works best for them.   Free support includes personalized coaching, email and text support, educational materials, and quit medication (nicotine patches, gum or lozenges) delivered by mail.     Contact [employer] at 7-279-QUIT-NOW or online at [employer] to receive support on your quit journey.     GET AFRIN use at onset of nose bleed. Apply pressure for 20-30 mins leaning forward with steady pressure. If after that time ENT providers want you to go to the ER for pressure to be used with use of other tools.     After Cautry today  NO BENDING  NO LIFTING  NO STOOPING  NO LEANING FORWARD  These all will cause increased pressure in the sinus that can cause an onset of a nose bleed.    NO PICKING NO RUBBING at nose either. APPLY Aquaphor to keep scab moist.

## 2023-08-22 LAB
ABO/RH(D): NORMAL
ANTIBODY SCREEN: NEGATIVE
DAT POLY: NEGATIVE
SPECIMEN EXPIRATION DATE: NORMAL
SPECIMEN EXPIRATION DATE: NORMAL

## 2023-08-23 ENCOUNTER — LAB (OUTPATIENT)
Dept: LAB | Facility: CLINIC | Age: 53
End: 2023-08-23
Payer: COMMERCIAL

## 2023-08-23 ENCOUNTER — PRE VISIT (OUTPATIENT)
Dept: GASTROENTEROLOGY | Facility: CLINIC | Age: 53
End: 2023-08-23
Payer: COMMERCIAL

## 2023-08-23 ENCOUNTER — MYC MEDICAL ADVICE (OUTPATIENT)
Dept: FAMILY MEDICINE | Facility: CLINIC | Age: 53
End: 2023-08-23

## 2023-08-23 DIAGNOSIS — D64.9 ANEMIA, UNSPECIFIED TYPE: ICD-10-CM

## 2023-08-23 DIAGNOSIS — K70.30 ALCOHOLIC CIRRHOSIS OF LIVER WITHOUT ASCITES (H): ICD-10-CM

## 2023-08-23 LAB
ACANTHOCYTES BLD QL SMEAR: SLIGHT
BASOPHILS # BLD MANUAL: 0.2 10E3/UL (ref 0–0.2)
BASOPHILS NFR BLD MANUAL: 1 %
EOSINOPHIL # BLD MANUAL: 0.3 10E3/UL (ref 0–0.7)
EOSINOPHIL NFR BLD MANUAL: 2 %
ERYTHROCYTE [DISTWIDTH] IN BLOOD BY AUTOMATED COUNT: 17.7 % (ref 10–15)
FERRITIN SERPL-MCNC: 4261 NG/ML (ref 31–409)
FOLATE SERPL-MCNC: 13.4 NG/ML (ref 4.6–34.8)
HCT VFR BLD AUTO: 23.4 % (ref 40–53)
HGB BLD-MCNC: 8 G/DL (ref 13.3–17.7)
IRON BINDING CAPACITY (ROCHE): NORMAL
IRON SATN MFR SERPL: NORMAL %
IRON SERPL-MCNC: 120 UG/DL (ref 61–157)
LYMPHOCYTES # BLD MANUAL: 1.3 10E3/UL (ref 0.8–5.3)
LYMPHOCYTES NFR BLD MANUAL: 8 %
MCH RBC QN AUTO: 33.5 PG (ref 26.5–33)
MCHC RBC AUTO-ENTMCNC: 34.2 G/DL (ref 31.5–36.5)
MCV RBC AUTO: 98 FL (ref 78–100)
METAMYELOCYTES # BLD MANUAL: 0.7 10E3/UL
METAMYELOCYTES NFR BLD MANUAL: 4 %
MONOCYTES # BLD MANUAL: 0.2 10E3/UL (ref 0–1.3)
MONOCYTES NFR BLD MANUAL: 1 %
MYELOCYTES # BLD MANUAL: 0.3 10E3/UL
MYELOCYTES NFR BLD MANUAL: 2 %
NEUTROPHILS # BLD MANUAL: 13.5 10E3/UL (ref 1.6–8.3)
NEUTROPHILS NFR BLD MANUAL: 82 %
PLAT MORPH BLD: ABNORMAL
PLATELET # BLD AUTO: 111 10E3/UL (ref 150–450)
RBC # BLD AUTO: 2.39 10E6/UL (ref 4.4–5.9)
RBC MORPH BLD: ABNORMAL
RETICS # AUTO: 0.14 10E6/UL (ref 0.03–0.1)
RETICS/RBC NFR AUTO: 5.7 % (ref 0.5–2)
WBC # BLD AUTO: 16.5 10E3/UL (ref 4–11)

## 2023-08-23 PROCEDURE — 86381 MITOCHONDRIAL ANTIBODY EACH: CPT

## 2023-08-23 PROCEDURE — 82728 ASSAY OF FERRITIN: CPT

## 2023-08-23 PROCEDURE — 99207 BLOOD MORPHOLOGY PATHOLOGIST REVIEW: CPT | Performed by: STUDENT IN AN ORGANIZED HEALTH CARE EDUCATION/TRAINING PROGRAM

## 2023-08-23 PROCEDURE — 82746 ASSAY OF FOLIC ACID SERUM: CPT

## 2023-08-23 PROCEDURE — 85045 AUTOMATED RETICULOCYTE COUNT: CPT

## 2023-08-23 PROCEDURE — 83036 HEMOGLOBIN GLYCOSYLATED A1C: CPT

## 2023-08-23 PROCEDURE — 36415 COLL VENOUS BLD VENIPUNCTURE: CPT

## 2023-08-23 PROCEDURE — 85007 BL SMEAR W/DIFF WBC COUNT: CPT

## 2023-08-23 PROCEDURE — 83550 IRON BINDING TEST: CPT

## 2023-08-23 PROCEDURE — 86900 BLOOD TYPING SEROLOGIC ABO: CPT

## 2023-08-23 PROCEDURE — 86880 COOMBS TEST DIRECT: CPT

## 2023-08-23 PROCEDURE — 86850 RBC ANTIBODY SCREEN: CPT

## 2023-08-23 PROCEDURE — 83540 ASSAY OF IRON: CPT

## 2023-08-23 PROCEDURE — 86901 BLOOD TYPING SEROLOGIC RH(D): CPT

## 2023-08-23 PROCEDURE — 85027 COMPLETE CBC AUTOMATED: CPT

## 2023-08-24 ENCOUNTER — VIRTUAL VISIT (OUTPATIENT)
Dept: GASTROENTEROLOGY | Facility: CLINIC | Age: 53
End: 2023-08-24
Attending: PHYSICIAN ASSISTANT
Payer: COMMERCIAL

## 2023-08-24 ENCOUNTER — MYC MEDICAL ADVICE (OUTPATIENT)
Dept: FAMILY MEDICINE | Facility: CLINIC | Age: 53
End: 2023-08-24
Payer: COMMERCIAL

## 2023-08-24 DIAGNOSIS — D64.9 ANEMIA DUE TO UNKNOWN MECHANISM: Primary | ICD-10-CM

## 2023-08-24 DIAGNOSIS — K70.30 ALCOHOLIC CIRRHOSIS OF LIVER WITHOUT ASCITES (H): Primary | ICD-10-CM

## 2023-08-24 LAB
HBA1C MFR BLD: 5.1 % (ref 0–5.6)
PATH REPORT.COMMENTS IMP SPEC: NORMAL
PATH REPORT.COMMENTS IMP SPEC: NORMAL
PATH REPORT.FINAL DX SPEC: NORMAL
PATH REPORT.MICROSCOPIC SPEC OTHER STN: NORMAL
PATH REPORT.MICROSCOPIC SPEC OTHER STN: NORMAL
PATH REPORT.RELEVANT HX SPEC: NORMAL

## 2023-08-24 PROCEDURE — 99205 OFFICE O/P NEW HI 60 MIN: CPT | Mod: VID | Performed by: PHYSICIAN ASSISTANT

## 2023-08-24 NOTE — LETTER
8/24/2023         RE: Mary Lopez  [address] [place] [address]        Dear Colleague,    Thank you for referring your patient, Mary Lopez, to the [facility]PAT[facility] [place]. Please see a copy of my visit note below.    Virtual Visit Details    Type of service:  Video Visit   Joined the call at 8/24/2023, 11:56:52?am.  Left the call at 8/24/2023, 12:35:16?pm.  Originating Location (pt. Location): Home    Distant Location (provider location):  On-site  Platform used for Video Visit: [facility]    Hepatology Clinic note  Mary Lopez   Date of Birth 1970  Date of Service 8/24/2023     REASON FOR CONSULTATION: Hospital Follow up   REFERRING PROVIDER: Jimmie Hoyt MD           Assessment/plan:   Mary Lopez is a 52 year old male with ETOH cirrhosis complicated by history of anasarca and fluid overload, mild hepatic encephalopathy. During last hospitalization, MELD-3.0, was 34 (tbili 7.1, Alb 2.4, INR 2.87, Cr 1.72).  T. bili improving over the week to 6.3 and kidney function also improving. No alcohol since June 25, 2023. Symptomatically he is doing better. Discussed importance of nutrition as he continues to recover.  Discussed that it can take a good 6 to 12 months to determine new baseline liver function.    # Check A1 AT, hemoglobin A1c, BURTON, F-actin, TTG, IgA, HFE mutation, BMP, CBC, INR, hepatic panel in 3 weeks      # Anemia, multi-factorial (haptaglobin low, LDH high): trending up to 8.0 yesterday  - Trend hemoglobin     #HCC, up-to-date:  - Continue routine HCC screening every 6 months, next ultrasound due December 2023     #Hepatic encephalopathy, well controlled:   - Continue lactulose (adjust dose to 2-3 bowel movements a day)     #History of alcohol abuse:  - Congratulated on his efforts with maintaining sobriety   -Absolute sobriety from alcohol  - Recommend some type of behavior therapy to help maintain sobriety long-term    # Sleep  disturbances, long history of poor sleep, used alcohol as an aid:   - Recommend sleep psychology referral  - Prefer trazodone over doxepin     # Variceal screening, up-to-date:  - Recent EGD showing grade 1 esophageal varices, moderate PHG with oozing and type II GOV 2  -No indication for repeat scope at this time     # DANILO/Anasarca, persistent, overall improved:   - Continue furosemide 40  mg daily   - Continue spironolacatone 100 mg daily  -Increase protein to 1100-120 gm protein daily    Follow-up in clinic in 3 months      Sher Dutton PA-C   [facility] Hepatology      Total time for E/M services performed on the date of the encounter 60 minutes.  This included review of previous: clinic visits, hospital records, lab results, imaging studies, and procedural documentation. Time also includes patient visit, documentation and discussion with other providers.  The findings from this review are summarized in the above note.      -----------------------------------------------------       HPI:   Mary Lopez is a 52 year old male  presenting for the evaluation of elevated LFT's.      Cirrhosis:   ETOH (risk factors for metabolic: prediabetes, HTN, HLD, obesity)   - MIld   - No GI bleed or ascites   EGD: 7/26/2023: grade 1 esophageal varices, moderate PHG with oozing and type II G OV 2     Patient was admitted in late June 2023 for scrotal cellulitis, acute kidney injury and bilateral lower extremity edema, thought due to phonic alcohol abuse.  He has CT of the abdomen pelvis and 2023 which showed cirrhotic appearing liver, splenomegaly, numerous portosystemic varices including large gastric varices, large splenorenal shunt.    He was hospitalized again in late July for a few days for severe symptomatic anemia thought to be due to hemorrhoidal bleeding, bone marrow suppression from alcohol use and chronic blood loss from portal gastropathy.  He was started on iron supplement and omeprazole 20 mg  twice daily     Appetite is good. Weight was previously 260, down 232, much of it fluid losses.   Currently taking Lactulose (30 mL twice daily), having 4-5 BMs daily. No previously problems with confusion. Lethargy around the time of admission for cellulitis.     Bilateral lower extremity/anasarca improving. Taking 100 mg spironolacton and 40 mg furosemide.      Patient also denies melena, hematochezia or hematemesis. Patient denies fevers, sweats or chills.     PMH:    has a past medical history of Alcohol use (03/11/2020), Alcoholic cirrhosis of liver without ascites (H) (07/13/2023), Alcoholic hepatitis without ascites (07/13/2023), Bilateral leg edema (07/25/2023), Concussion without loss of consciousness (03/11/2020), Elevated liver enzymes (05/03/2022), Essential hypertension (03/11/2020), IFG (impaired fasting glucose) (12/07/2021), Mild hyperlipidemia (12/07/2021), Other specified anemias (07/13/2023), Persistent insomnia (07/13/2023), Portal hypertension (H) (07/13/2023), Secondary esophageal 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 (H) (07/13/2023), Splenomegaly (07/13/2023), Thrombocytopenia (H) (07/13/2023), and Tobacco abuse disorder (03/11/2020).         SMH:    has a past surgical history that includes Cholecystectomy (unknown).      Medications:   cyclobenzaprine  doxepin  furosemide  lactulose  Melatonin Tabs  multivitamin w/minerals  omeprazole  permethrin  spironolactone  zinc oxide      Currently chew tobacco. Last 2-3 years, was drinking up to half 1.75 L vodka daily, not drinking for a week a two at time, driven by need for more sleep. June 25th 2023 was last alcohol. No CD treatment in the past. Denies cravings. No previous IV/IN drug use.  Have a problem with sleeping and staying asleep. Not sleeping very well now. Takes doxepin, muscle relaxer for sleep. Currently on short-term disability - works for [employer]. Live with [facility]. No known family history of liver disease or liver cancer.     Previous  work-up:   Lab Results   Component Value Date    AUSAB 2.19 08/17/2023    KE 4,261 (H) 08/23/2023    IRONSAT  08/23/2023      Comment:      Unable to calculate:  UIBC or Iron value is outside detectable level.    TSH 2.18 03/12/2022    CHOL 203 (H) 03/12/2022    HDL 71 03/12/2022     (H) 03/12/2022    TRIG 151 (H) 03/12/2022    A1C 5.7 (H) 03/12/2022      No results found for: SPECDES, LDRESULTS    HFE -     Recent Labs   Lab Test 08/17/23  1112 08/10/23  1107 07/25/23  1702 07/15/23  1048 09/03/22  1006 03/12/22  1006   ALKPHOS 270* 235* 214* 205* 135 120   ALT 42 45 38 32 84* 93*   AST 90* 105* 97* 94* 167* 186*          Allergies:            Allergies   Allergen Reactions     Fish Swelling     Food         baked beans              Social History:      Social History   Social History            Socioeconomic History     Marital status:        Spouse name: Not on file     Number of children: Not on file     Years of education: Not on file     Highest education level: Not on file   Occupational History     Not on file   Tobacco Use     Smoking status: Never       Passive exposure: Never     Smokeless tobacco: Current       Types: Chew   Vaping Use     Vaping Use: Never used   Substance and Sexual Activity     Alcohol use: Yes       Alcohol/week: 12.0 standard drinks of alcohol       Types: 12 Standard drinks or equivalent per week       Comment: 12 drinsk per week     Drug use: Not Currently     Sexual activity: Yes       Partners: Female       Birth control/protection: Male Surgical   Other Topics Concern     Parent/sibling w/ CABG, MI or angioplasty before 65F 55M? No   Social History Narrative     Not on file      Social Determinants of Health      Financial Resource Strain: Not on file   Food Insecurity: Not on file   Transportation Needs: Not on file   Physical Activity: Not on file   Stress: Not on file   Social Connections: Not on file   Intimate Partner Violence: Not on file   Housing  Stability: Not on file                 Family History:      Family History         Family History   Problem Relation Age of Onset     Chronic Obstructive Pulmonary Disease Mother       Lung Cancer Mother 81     Morbid Obesity Father       Diabetes Type 2  Brother                   Review of Systems:   Gen: See HPI      HEENT: No change in vision or hearing, mouth sores, dysphagia, lymph nodes  Resp: No shortness of breath, coughing, hx of asthma  CV: No chest pain, palpitations, syncope   GI: See HPI  : No dysuria, history of stones, urine color    Skin: No rash; no pruritus or psoriasis  MS: No arthralgias, myalgias, joint swelling  Neuro: No memory changes, confusion, numbness    Heme: No difficulty clotting, bruising, bleeding  Psych:  No anxiety, depression, agitation           Physical Exam:   GENERAL: healthy, alert and no distress  EYES: Eyes grossly normal to inspection, conjunctivae and sclerae normal  RESP: no audible wheeze, cough, or visible cyanosis.  No visible retractions or increased work of breathing.  Able to speak fully in complete sentences  NEURO: Cranial nerves grossly intact, mentation intact and speech normal  PSYCH: mentation appears normal, affect normal/bright, judgement and insight intact, normal speech and appearance well-groomed          Data:   Reviewed in person and significant for:           Lab Results   Component Value Date      08/10/2023      07/05/2020            Lab Results   Component Value Date     POTASSIUM 5.0 08/10/2023     POTASSIUM 3.8 03/12/2022     POTASSIUM 4.2 07/05/2020            Lab Results   Component Value Date     CHLORIDE 101 08/10/2023     CHLORIDE 101 03/12/2022     CHLORIDE 102 07/05/2020            Lab Results   Component Value Date     CO2 23 08/10/2023     CO2 25 03/12/2022     CO2 28 07/05/2020            Lab Results   Component Value Date     BUN 14.1 08/10/2023     BUN 11 03/12/2022     BUN 13 07/05/2020            Lab Results    Component Value Date     CR 0.88 08/10/2023     CR 0.95 07/05/2020               Lab Results   Component Value Date     WBC 16.5 08/23/2023     WBC 10.8 12/19/2006            Lab Results   Component Value Date     HGB 8.0 08/23/2023     HGB 16.9 12/19/2006            Lab Results   Component Value Date     HCT 23.4 08/23/2023     HCT 47.5 12/19/2006            Lab Results   Component Value Date     MCV 98 08/23/2023     MCV 85 12/19/2006            Lab Results   Component Value Date      08/23/2023      12/19/2006               Lab Results   Component Value Date     AST 90 08/17/2023     AST 52 12/19/2006            Lab Results   Component Value Date     ALT 42 08/17/2023     ALT 50 12/19/2006            Lab Results   Component Value Date     BILICONJ 0.0 12/19/2006            Lab Results   Component Value Date     BILITOTAL 6.3 08/17/2023     BILITOTAL 0.8 12/19/2006               Lab Results   Component Value Date     ALBUMIN 2.1 08/17/2023     ALBUMIN 4.0 09/03/2022     ALBUMIN 5.2 12/19/2006            Lab Results   Component Value Date     PROTTOTAL 7.3 08/17/2023     PROTTOTAL 9.7 12/19/2006            Lab Results   Component Value Date     ALKPHOS 270 08/17/2023     ALKPHOS 117 12/19/2006               Lab Results   Component Value Date     INR 3.29 08/17/2023           Imaging:    CT ABDOMEN & PELVIS W/O ORAL W IV CON  Order: [id number]  Impression     IMPRESSION:  1. Findings of chronic liver disease including a cirrhotic appearing liver with evidence of portal hypertension including splenomegaly and large gastric varices.  2. No acute intra-abdominal findings. No hydronephrosis.      Again, thank you for allowing me to participate in the care of your patient.        Sincerely,        Sher Dutton PA-C

## 2023-08-24 NOTE — PROGRESS NOTES
Virtual Visit Details    Type of service:  Video Visit   Joined the call at 8/24/2023, 11:56:52?am.  Left the call at 8/24/2023, 12:35:16?pm.  Originating Location (pt. Location): Home    Distant Location (provider location):  On-site  Platform used for Video Visit: [facility]    Hepatology Clinic note  Mary Lopez   Date of Birth 1970  Date of Service 8/24/2023     REASON FOR CONSULTATION: Hospital Follow up   REFERRING PROVIDER: Jimmie Hoyt MD           Assessment/plan:   Mary Lopez is a 52 year old male with ETOH cirrhosis complicated by history of anasarca and fluid overload, mild hepatic encephalopathy. During last hospitalization, MELD-3.0, was 34 (tbili 7.1, Alb 2.4, INR 2.87, Cr 1.72).  T. bili improving over the week to 6.3 and kidney function also improving. No alcohol since June 25, 2023. Symptomatically he is doing better. Discussed importance of nutrition as he continues to recover.  Discussed that it can take a good 6 to 12 months to determine new baseline liver function.    # Check A1 AT, hemoglobin A1c, BURTON, F-actin, TTG, IgA, HFE mutation, BMP, CBC, INR, hepatic panel in 3 weeks      # Anemia, multi-factorial (haptaglobin low, LDH high): trending up to 8.0 yesterday  - Trend hemoglobin     #HCC, up-to-date:  - Continue routine HCC screening every 6 months, next ultrasound due December 2023     #Hepatic encephalopathy, well controlled:   - Continue lactulose (adjust dose to 2-3 bowel movements a day)     #History of alcohol abuse:  - Congratulated on his efforts with maintaining sobriety   -Absolute sobriety from alcohol  - Recommend some type of behavior therapy to help maintain sobriety long-term    # Sleep disturbances, long history of poor sleep, used alcohol as an aid:   - Recommend sleep psychology referral  - Prefer trazodone over doxepin     # Variceal screening, up-to-date:  - Recent EGD showing grade 1 esophageal varices, moderate PHG with oozing and type II GOV 2  -No  indication for repeat scope at this time     # DANILO/Anasarca, persistent, overall improved:   - Continue furosemide 40  mg daily   - Continue spironolacatone 100 mg daily  -Increase protein to 1100-120 gm protein daily    Follow-up in clinic in 3 months      Sher Dutton PA-C   [facility] Hepatology      Total time for E/M services performed on the date of the encounter 60 minutes.  This included review of previous: clinic visits, hospital records, lab results, imaging studies, and procedural documentation. Time also includes patient visit, documentation and discussion with other providers.  The findings from this review are summarized in the above note.      -----------------------------------------------------       HPI:   Mary Lopez is a 52 year old male  presenting for the evaluation of elevated LFT's.      Cirrhosis:   ETOH (risk factors for metabolic: prediabetes, HTN, HLD, obesity)   - MIld   - No GI bleed or ascites   EGD: 7/26/2023: grade 1 esophageal varices, moderate PHG with oozing and type II G OV 2     Patient was admitted in late June 2023 for scrotal cellulitis, acute kidney injury and bilateral lower extremity edema, thought due to phonic alcohol abuse.  He has CT of the abdomen pelvis and 2023 which showed cirrhotic appearing liver, splenomegaly, numerous portosystemic varices including large gastric varices, large splenorenal shunt.    He was hospitalized again in late July for a few days for severe symptomatic anemia thought to be due to hemorrhoidal bleeding, bone marrow suppression from alcohol use and chronic blood loss from portal gastropathy.  He was started on iron supplement and omeprazole 20 mg twice daily     Appetite is good. Weight was previously 260, down 232, much of it fluid losses.   Currently taking Lactulose (30 mL twice daily), having 4-5 BMs daily. No previously problems with confusion. Lethargy around the time of admission for cellulitis.     Bilateral  lower extremity/anasarca improving. Taking 100 mg spironolacton and 40 mg furosemide.      Patient also denies melena, hematochezia or hematemesis. Patient denies fevers, sweats or chills.     PMH:    has a past medical history of Alcohol use (03/11/2020), Alcoholic cirrhosis of liver without ascites (H) (07/13/2023), Alcoholic hepatitis without ascites (07/13/2023), Bilateral leg edema (07/25/2023), Concussion without loss of consciousness (03/11/2020), Elevated liver enzymes (05/03/2022), Essential hypertension (03/11/2020), IFG (impaired fasting glucose) (12/07/2021), Mild hyperlipidemia (12/07/2021), Other specified anemias (07/13/2023), Persistent insomnia (07/13/2023), Portal hypertension (H) (07/13/2023), Secondary esophageal 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 (H) (07/13/2023), Splenomegaly (07/13/2023), Thrombocytopenia (H) (07/13/2023), and Tobacco abuse disorder (03/11/2020).         SMH:    has a past surgical history that includes Cholecystectomy (unknown).      Medications:   cyclobenzaprine  doxepin  furosemide  lactulose  Melatonin Tabs  multivitamin w/minerals  omeprazole  permethrin  spironolactone  zinc oxide      Currently chew tobacco. Last 2-3 years, was drinking up to half 1.75 L vodka daily, not drinking for a week a two at time, driven by need for more sleep. June 25th 2023 was last alcohol. No CD treatment in the past. Denies cravings. No previous IV/IN drug use.  Have a problem with sleeping and staying asleep. Not sleeping very well now. Takes doxepin, muscle relaxer for sleep. Currently on short-term disability - works for [employer]. Live with [facility]. No known family history of liver disease or liver cancer.     Previous work-up:   Lab Results   Component Value Date    AUSAB 2.19 08/17/2023    KE 4,261 (H) 08/23/2023    IRONSAT  08/23/2023      Comment:      Unable to calculate:  UIBC or Iron value is outside detectable level.    TSH 2.18 03/12/2022    CHOL 203 (H) 03/12/2022    HDL 71  03/12/2022     (H) 03/12/2022    TRIG 151 (H) 03/12/2022    A1C 5.7 (H) 03/12/2022      No results found for: SPECDES, LDRESULTS    HFE -     Recent Labs   Lab Test 08/17/23  1112 08/10/23  1107 07/25/23  1702 07/15/23  1048 09/03/22  1006 03/12/22  1006   ALKPHOS 270* 235* 214* 205* 135 120   ALT 42 45 38 32 84* 93*   AST 90* 105* 97* 94* 167* 186*          Allergies:            Allergies   Allergen Reactions    Fish Swelling    Food         baked beans              Social History:      Social History   Social History            Socioeconomic History    Marital status:        Spouse name: Not on file    Number of children: Not on file    Years of education: Not on file    Highest education level: Not on file   Occupational History    Not on file   Tobacco Use    Smoking status: Never       Passive exposure: Never    Smokeless tobacco: Current       Types: Chew   Vaping Use    Vaping Use: Never used   Substance and Sexual Activity    Alcohol use: Yes       Alcohol/week: 12.0 standard drinks of alcohol       Types: 12 Standard drinks or equivalent per week       Comment: 12 drinsk per week    Drug use: Not Currently    Sexual activity: Yes       Partners: Female       Birth control/protection: Male Surgical   Other Topics Concern    Parent/sibling w/ CABG, MI or angioplasty before 65F 55M? No   Social History Narrative    Not on file      Social Determinants of Health      Financial Resource Strain: Not on file   Food Insecurity: Not on file   Transportation Needs: Not on file   Physical Activity: Not on file   Stress: Not on file   Social Connections: Not on file   Intimate Partner Violence: Not on file   Housing Stability: Not on file                 Family History:      Family History         Family History   Problem Relation Age of Onset    Chronic Obstructive Pulmonary Disease Mother      Lung Cancer Mother 81    Morbid Obesity Father      Diabetes Type 2  Brother                   Review of  Systems:   Gen: See HPI      HEENT: No change in vision or hearing, mouth sores, dysphagia, lymph nodes  Resp: No shortness of breath, coughing, hx of asthma  CV: No chest pain, palpitations, syncope   GI: See HPI  : No dysuria, history of stones, urine color    Skin: No rash; no pruritus or psoriasis  MS: No arthralgias, myalgias, joint swelling  Neuro: No memory changes, confusion, numbness    Heme: No difficulty clotting, bruising, bleeding  Psych:  No anxiety, depression, agitation           Physical Exam:   GENERAL: healthy, alert and no distress  EYES: Eyes grossly normal to inspection, conjunctivae and sclerae normal  RESP: no audible wheeze, cough, or visible cyanosis.  No visible retractions or increased work of breathing.  Able to speak fully in complete sentences  NEURO: Cranial nerves grossly intact, mentation intact and speech normal  PSYCH: mentation appears normal, affect normal/bright, judgement and insight intact, normal speech and appearance well-groomed          Data:   Reviewed in person and significant for:           Lab Results   Component Value Date      08/10/2023      07/05/2020            Lab Results   Component Value Date     POTASSIUM 5.0 08/10/2023     POTASSIUM 3.8 03/12/2022     POTASSIUM 4.2 07/05/2020            Lab Results   Component Value Date     CHLORIDE 101 08/10/2023     CHLORIDE 101 03/12/2022     CHLORIDE 102 07/05/2020            Lab Results   Component Value Date     CO2 23 08/10/2023     CO2 25 03/12/2022     CO2 28 07/05/2020            Lab Results   Component Value Date     BUN 14.1 08/10/2023     BUN 11 03/12/2022     BUN 13 07/05/2020            Lab Results   Component Value Date     CR 0.88 08/10/2023     CR 0.95 07/05/2020               Lab Results   Component Value Date     WBC 16.5 08/23/2023     WBC 10.8 12/19/2006            Lab Results   Component Value Date     HGB 8.0 08/23/2023     HGB 16.9 12/19/2006            Lab Results   Component Value  Date     HCT 23.4 08/23/2023     HCT 47.5 12/19/2006            Lab Results   Component Value Date     MCV 98 08/23/2023     MCV 85 12/19/2006            Lab Results   Component Value Date      08/23/2023      12/19/2006               Lab Results   Component Value Date     AST 90 08/17/2023     AST 52 12/19/2006            Lab Results   Component Value Date     ALT 42 08/17/2023     ALT 50 12/19/2006            Lab Results   Component Value Date     BILICONJ 0.0 12/19/2006            Lab Results   Component Value Date     BILITOTAL 6.3 08/17/2023     BILITOTAL 0.8 12/19/2006               Lab Results   Component Value Date     ALBUMIN 2.1 08/17/2023     ALBUMIN 4.0 09/03/2022     ALBUMIN 5.2 12/19/2006            Lab Results   Component Value Date     PROTTOTAL 7.3 08/17/2023     PROTTOTAL 9.7 12/19/2006            Lab Results   Component Value Date     ALKPHOS 270 08/17/2023     ALKPHOS 117 12/19/2006               Lab Results   Component Value Date     INR 3.29 08/17/2023           Imaging:    CT ABDOMEN & PELVIS W/O ORAL W IV CON  Order: [id number]  Impression     IMPRESSION:  1. Findings of chronic liver disease including a cirrhotic appearing liver with evidence of portal hypertension including splenomegaly and large gastric varices.  2. No acute intra-abdominal findings. No hydronephrosis.

## 2023-08-24 NOTE — TELEPHONE ENCOUNTER
Called and spoke to pt's wife.    Consent to communicate on file.      Relayed provider's message.    Assisted in scheduling visit.    Kary Smith RN  [facility]

## 2023-08-24 NOTE — TELEPHONE ENCOUNTER
Routing to provider to review and advise on next steps.    BRIDGET Vital  [facility] Primary Care Triage

## 2023-08-24 NOTE — TELEPHONE ENCOUNTER
Please give patient an appointment to see me on August 29 at 10 AM for follow-up of edema, anemia and liver disease

## 2023-08-24 NOTE — NURSING NOTE
Is the patient currently in the state of [place]? YES    Visit mode:VIDEO    If the visit is dropped, the patient can be reconnected by: VIDEO VISIT: Send to e-mail at: [email or web address]    Will anyone else be joining the visit? YES: How would they like to receive their invitation? Send to e-mail: PT's wife will be on camera  (If patient encounters technical issues they should call [phone]    How would you like to obtain your AVS? MyChart    Are changes needed to the allergy or medication list? No    Reason for visit: Consult    Casper CHRISTY

## 2023-08-25 LAB — MITOCHONDRIA M2 IGG SER-ACNC: 1.2 U/ML

## 2023-08-29 ENCOUNTER — VIRTUAL VISIT (OUTPATIENT)
Dept: FAMILY MEDICINE | Facility: CLINIC | Age: 53
End: 2023-08-29
Payer: COMMERCIAL

## 2023-08-29 ENCOUNTER — LAB (OUTPATIENT)
Dept: LAB | Facility: CLINIC | Age: 53
End: 2023-08-29
Payer: COMMERCIAL

## 2023-08-29 ENCOUNTER — TELEPHONE (OUTPATIENT)
Dept: FAMILY MEDICINE | Facility: CLINIC | Age: 53
End: 2023-08-29

## 2023-08-29 ENCOUNTER — DOCUMENTATION ONLY (OUTPATIENT)
Dept: FAMILY MEDICINE | Facility: CLINIC | Age: 53
End: 2023-08-29

## 2023-08-29 DIAGNOSIS — K31.89 PORTAL HYPERTENSIVE GASTROPATHY (H): ICD-10-CM

## 2023-08-29 DIAGNOSIS — K70.30 ALCOHOLIC CIRRHOSIS OF LIVER WITHOUT ASCITES (H): ICD-10-CM

## 2023-08-29 DIAGNOSIS — D64.9 ANEMIA DUE TO UNKNOWN MECHANISM: ICD-10-CM

## 2023-08-29 DIAGNOSIS — I10 ESSENTIAL HYPERTENSION: ICD-10-CM

## 2023-08-29 DIAGNOSIS — I10 ESSENTIAL HYPERTENSION: Primary | ICD-10-CM

## 2023-08-29 DIAGNOSIS — K70.30 ALCOHOLIC CIRRHOSIS OF LIVER WITHOUT ASCITES (H): Primary | ICD-10-CM

## 2023-08-29 DIAGNOSIS — R60.0 BILATERAL LEG EDEMA: ICD-10-CM

## 2023-08-29 DIAGNOSIS — K76.6 PORTAL HYPERTENSIVE GASTROPATHY (H): ICD-10-CM

## 2023-08-29 DIAGNOSIS — R04.0 EPISTAXIS: ICD-10-CM

## 2023-08-29 DIAGNOSIS — G47.00 PERSISTENT INSOMNIA: ICD-10-CM

## 2023-08-29 LAB
ANION GAP SERPL CALCULATED.3IONS-SCNC: 11 MMOL/L (ref 7–15)
BUN SERPL-MCNC: 21.5 MG/DL (ref 6–20)
CALCIUM SERPL-MCNC: 9.1 MG/DL (ref 8.6–10)
CHLORIDE SERPL-SCNC: 93 MMOL/L (ref 98–107)
CREAT SERPL-MCNC: 0.93 MG/DL (ref 0.67–1.17)
DEPRECATED HCO3 PLAS-SCNC: 19 MMOL/L (ref 22–29)
ERYTHROCYTE [DISTWIDTH] IN BLOOD BY AUTOMATED COUNT: 17.3 % (ref 10–15)
GFR SERPL CREATININE-BSD FRML MDRD: >90 ML/MIN/1.73M2
GLUCOSE SERPL-MCNC: 167 MG/DL (ref 70–99)
HCT VFR BLD AUTO: 23.6 % (ref 40–53)
HGB BLD-MCNC: 8.1 G/DL (ref 13.3–17.7)
HOLD SPECIMEN: NORMAL
MCH RBC QN AUTO: 33.6 PG (ref 26.5–33)
MCHC RBC AUTO-ENTMCNC: 34.3 G/DL (ref 31.5–36.5)
MCV RBC AUTO: 98 FL (ref 78–100)
PLATELET # BLD AUTO: 137 10E3/UL (ref 150–450)
POTASSIUM SERPL-SCNC: 5.6 MMOL/L (ref 3.4–5.3)
RBC # BLD AUTO: 2.41 10E6/UL (ref 4.4–5.9)
SODIUM SERPL-SCNC: 123 MMOL/L (ref 136–145)
WBC # BLD AUTO: 20.1 10E3/UL (ref 4–11)

## 2023-08-29 PROCEDURE — 85027 COMPLETE CBC AUTOMATED: CPT

## 2023-08-29 PROCEDURE — 36415 COLL VENOUS BLD VENIPUNCTURE: CPT

## 2023-08-29 PROCEDURE — 99214 OFFICE O/P EST MOD 30 MIN: CPT | Mod: VID | Performed by: INTERNAL MEDICINE

## 2023-08-29 PROCEDURE — 80048 BASIC METABOLIC PNL TOTAL CA: CPT

## 2023-08-29 NOTE — PROGRESS NOTES
Patient have a lab appointment today 8-29-23, but there is no order. Could you order please if needed.  Thank you,  Neeta Magallanes MLT(ASCP)

## 2023-08-29 NOTE — PROGRESS NOTES
"Mary is a 52 year old who is being evaluated via a billable video visit.      How would you like to obtain your AVS? MyChart  If the video visit is dropped, the invitation should be resent by: Text to cell phone: [phone]  Will anyone else be joining your video visit? No          Assessment & Plan     1.  Alcohol cirrhosis of liver.  Patient stable and doing okay.  Continue with lactulose.  2.  Portal hypertensive gastropathy.  Stable continue with omeprazole 40 mg total a day.  3.  Bilateral leg edema and volume overload improved since starting Lasix 40 mg a day and spironolactone 100 mg a day.  Weight down from 263 pounds earlier this month to now up to 220 pounds today morning at home.  Sodium is now down to 123 and potassium is borderline up at 5.6.  Probably from diuretic therapy.  This point recommend no change.  Recheck lab in 1 week.  We will then decide.  4.  Anemia multifactorial hemoglobin is now stabilized at around 8.1 g/dL we will continue to monitor weekly.  5.  Epistaxis for which she went to ER this week and now appears to have stopped.  Was cauterized by ENT earlier.  6.  Persistent insomnia.  Very interrupted sleep pattern throughout the night.  Doxepin 10 mg is not helping as much.  He has some trazodone 50 mg and is going to try taking it along with doxepin and see.  I will refer him to sleep medicine for evaluation.  Obstructive sleep apnea is to be considered.  7.  Essential hypertension with blood pressure under control    Patient has an appointment to see me in a couple of weeks which she will keep.  Is still not able to return to work.  His work is quite physical.  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 at this point he has difficulty even ambulating because of the painful legs.         BMI:   Estimated body mass index is 36.74 kg/m  as calculated from the following:    Height as of 8/18/23: 1.755 m (5' 9.09\").    Weight as of 8/18/23: 113.2 kg (249 " lb 8 oz).           Jimmie Hoyt MD  [facility] MAYA Hernandez is a 52 year old, presenting for the following health issues:  Edema and Follow Up      8/29/2023    11:19 AM   Additional Questions   Roomed by Neris       History of Present Illness       Reason for visit:  Edema, liver Disease, Anemia    He eats 2-3 servings of fruits and vegetables daily.He consumes 1 sweetened beverage(s) daily.He exercises with enough effort to increase his heart rate 9 or less minutes per day.  He exercises with enough effort to increase his heart rate 3 or less days per week. He is missing 1 dose(s) of medications per week.  He is not taking prescribed medications regularly due to remembering to take.     Video visit with the patient today regarding alcohol cirrhosis of liver, anasarca, anemia and other health problems as previously listed.  His biggest problem right now is insomnia.  His weight is coming down and fluid overload is getting better though his feet and legs are still swollen and painful.  He feels he is 50% better in his legs.  They are quite painful particularly with standing and walking.  He sleeps 20 to 30 minutes and then wakes up.  Doxepin initially helped more than it is now.  He is wondering if he should switch to trazodone.  Work combine both.  He does snore.  We discussed referral to sleep medicine.  Over the weekend he did go to the ER with a nosebleed but stopped on its own.  Previously cauterized by ENT.  Hemoglobin is now 6 remaining stable at around 8 g/dL.      Review of Systems   Constitutional, HEENT, cardiovascular, pulmonary, GI, , musculoskeletal, neuro, skin, endocrine and psych systems are negative, except as otherwise noted.      Objective           Vitals:  No vitals were obtained today due to virtual visit.    Physical Exam   GENERAL: Healthy, alert and no distress  EYES: Eyes grossly normal to inspection.  No discharge or erythema, or obvious  scleral/conjunctival abnormalities.  RESP: No audible wheeze, cough, or visible cyanosis.  No visible retractions or increased work of breathing.    SKIN: Visible skin clear. No significant rash, abnormal pigmentation or lesions.  NEURO: Cranial nerves grossly intact.  Mentation and speech appropriate for age.  PSYCH: Mentation appears normal, affect normal/bright, judgement and insight intact, normal speech and appearance well-groomed.     Latest Reference Range & Units 08/29/23 10:18   Sodium 136 - 145 mmol/L 123 (L)   Potassium 3.4 - 5.3 mmol/L 5.6 (H)   Chloride 98 - 107 mmol/L 93 (L)   Carbon Dioxide (CO2) 22 - 29 mmol/L 19 (L)   Urea Nitrogen 6.0 - 20.0 mg/dL 21.5 (H)   Creatinine 0.67 - 1.17 mg/dL 0.93   GFR Estimate >60 mL/min/1.73m2 >90   Calcium 8.6 - 10.0 mg/dL 9.1   Anion Gap 7 - 15 mmol/L 11   Glucose 70 - 99 mg/dL 167 (H)   (L): Data is abnormally low  (H): Data is abnormally high     Latest Reference Range & Units 08/29/23 10:18   WBC 4.0 - 11.0 10e3/uL 20.1 (H)   Hemoglobin 13.3 - 17.7 g/dL 8.1 (L)   Hematocrit 40.0 - 53.0 % 23.6 (L)   Platelet Count 150 - 450 10e3/uL 137 (L)   RBC Count 4.40 - 5.90 10e6/uL 2.41 (L)   MCV 78 - 100 fL 98   MCH 26.5 - 33.0 pg 33.6 (H)   MCHC 31.5 - 36.5 g/dL 34.3   RDW 10.0 - 15.0 % 17.3 (H)   (H): Data is abnormally high  (L): Data is abnormally low        Video-Visit Details    Type of service:  Video Visit     Originating Location (pt. Location): Home    Distant Location (provider location):  On-site  Platform used for Video Visit: Facebook

## 2023-08-29 NOTE — TELEPHONE ENCOUNTER
Rcvd a call from Keyanna  [phone] ext [phone] (ok to [facility]) from pts disability company [employer]. She is faxing over forms today that need to be completed and returned ASAP. They are needing more specific detail as to what the pts current work restrictions are relating to his recent medical issues that make him unable to return to work. What exact symptoms or things are being seen when pt is in the office that make the provider feel pt is unable to work? If pt has restrictions and the company is able to accommodate those restrictions can the pt return to work either full duty or part time safely? If pt is unable to return to work is there a plan as to when he may be able to go back? If restrictions they need to know exactly what those restrictions are. Keyanna stated the forms that she is sending are to help clarify this information. Once we receive these forms we will be sure that they go to PCP. Looks like pt has an appt today  as well as on 9/14 with PCP    Sending to PCP as FYI

## 2023-08-31 ENCOUNTER — MYC MEDICAL ADVICE (OUTPATIENT)
Dept: FAMILY MEDICINE | Facility: CLINIC | Age: 53
End: 2023-08-31
Payer: COMMERCIAL

## 2023-08-31 DIAGNOSIS — K70.30 ALCOHOLIC CIRRHOSIS OF LIVER WITHOUT ASCITES (H): Primary | ICD-10-CM

## 2023-08-31 DIAGNOSIS — R41.0 CONFUSION: ICD-10-CM

## 2023-08-31 DIAGNOSIS — D50.0 IRON DEFICIENCY ANEMIA DUE TO CHRONIC BLOOD LOSS: Primary | ICD-10-CM

## 2023-08-31 RX ORDER — FERROUS SULFATE 325(65) MG
325 TABLET ORAL
COMMUNITY
Start: 2023-07-28 | End: 2023-08-31

## 2023-08-31 RX ORDER — FERROUS SULFATE 325(65) MG
325 TABLET ORAL
Qty: 90 TABLET | Refills: 3 | Status: SHIPPED | OUTPATIENT
Start: 2023-08-31 | End: 2023-12-26

## 2023-08-31 NOTE — TELEPHONE ENCOUNTER
Routing refill request to provider for review/approval because:  Medication is reported/historical    BRIDGET Vital  [facility] Triage

## 2023-09-05 ENCOUNTER — LAB (OUTPATIENT)
Dept: LAB | Facility: CLINIC | Age: 53
End: 2023-09-05
Payer: COMMERCIAL

## 2023-09-05 ENCOUNTER — TELEPHONE (OUTPATIENT)
Dept: FAMILY MEDICINE | Facility: CLINIC | Age: 53
End: 2023-09-05

## 2023-09-05 ENCOUNTER — MYC MEDICAL ADVICE (OUTPATIENT)
Dept: FAMILY MEDICINE | Facility: CLINIC | Age: 53
End: 2023-09-05

## 2023-09-05 DIAGNOSIS — R41.0 CONFUSION: ICD-10-CM

## 2023-09-05 DIAGNOSIS — K70.30 ALCOHOLIC CIRRHOSIS OF LIVER WITHOUT ASCITES (H): ICD-10-CM

## 2023-09-05 LAB
ALBUMIN UR-MCNC: NEGATIVE MG/DL
AMMONIA PLAS-SCNC: 63 UMOL/L (ref 16–60)
ANION GAP SERPL CALCULATED.3IONS-SCNC: 11 MMOL/L (ref 7–15)
APPEARANCE UR: ABNORMAL
BACTERIA #/AREA URNS HPF: ABNORMAL /HPF
BILIRUB UR QL STRIP: ABNORMAL
BUN SERPL-MCNC: 25.9 MG/DL (ref 6–20)
CALCIUM SERPL-MCNC: 9.5 MG/DL (ref 8.6–10)
CHLORIDE SERPL-SCNC: 86 MMOL/L (ref 98–107)
COLOR UR AUTO: ABNORMAL
CREAT SERPL-MCNC: 1.13 MG/DL (ref 0.67–1.17)
DEPRECATED HCO3 PLAS-SCNC: 20 MMOL/L (ref 22–29)
GFR SERPL CREATININE-BSD FRML MDRD: 79 ML/MIN/1.73M2
GLUCOSE SERPL-MCNC: 256 MG/DL (ref 70–99)
GLUCOSE UR STRIP-MCNC: NEGATIVE MG/DL
HGB UR QL STRIP: NEGATIVE
HYALINE CASTS #/AREA URNS LPF: ABNORMAL /LPF
KETONES UR STRIP-MCNC: NEGATIVE MG/DL
LEUKOCYTE ESTERASE UR QL STRIP: ABNORMAL
MUCOUS THREADS #/AREA URNS LPF: PRESENT /LPF
NITRATE UR QL: NEGATIVE
PH UR STRIP: 5.5 [PH] (ref 5–7)
POTASSIUM SERPL-SCNC: 4.8 MMOL/L (ref 3.4–5.3)
RBC #/AREA URNS AUTO: ABNORMAL /HPF
SODIUM SERPL-SCNC: 117 MMOL/L (ref 136–145)
SP GR UR STRIP: 1.02 (ref 1–1.03)
UROBILINOGEN UR STRIP-MCNC: 4 MG/DL
WBC #/AREA URNS AUTO: ABNORMAL /HPF
WBC CASTS #/AREA URNS LPF: ABNORMAL /LPF

## 2023-09-05 PROCEDURE — 82140 ASSAY OF AMMONIA: CPT

## 2023-09-05 PROCEDURE — 81001 URINALYSIS AUTO W/SCOPE: CPT

## 2023-09-05 PROCEDURE — 80048 BASIC METABOLIC PNL TOTAL CA: CPT

## 2023-09-05 PROCEDURE — 36415 COLL VENOUS BLD VENIPUNCTURE: CPT

## 2023-09-05 NOTE — TELEPHONE ENCOUNTER
I called the patient.  He was sleeping but his wife answered and indicated that he is cognition has improved since she made the call last Friday.  Still weak on his feet.  He had low sodium and it is possible that the hyponatremia is causing cognitive issue and weakness.  He is coming in to have CBC checked today, going to add ammonia, BMP and urine analysis.

## 2023-09-05 NOTE — TELEPHONE ENCOUNTER
Maribell, from [facility], contacted group re: critical result from today's labs for patient:    - Critical Sodium: 117 (low, range 136-145).    Results viewable in chart. Last Sodium level 123 on 8/29/23.      Routing to provider to review and advise      Valentina Knott, BSN, RN  [facility]

## 2023-09-05 NOTE — TELEPHONE ENCOUNTER
Sodium is 117 which explains patient's fatigue and mental confusion.  Advised to go to the hospital.  His symptoms are obviously related to hyponatremia.  He is currently on diuretic therapy so they will have to be on hold while the hyponatremia gets corrected  In the hospital.  The patient is going to the hospital.  Also the UA does show some increased white cells.  I will get back to the patient and his wife once I have the urine culture report.

## 2023-09-05 NOTE — TELEPHONE ENCOUNTER
Wife called in stating she was returning a call.  Consent to Communicate on file.      No message to relay at this time but informed the wife there was an abnormal lab result and Dr. Hoyt will advise on next steps.  Wife verbalized understanding.      Kristina Kjellberg, MSN, RN

## 2023-09-11 ENCOUNTER — PATIENT OUTREACH (OUTPATIENT)
Dept: CARE COORDINATION | Facility: CLINIC | Age: 53
End: 2023-09-11

## 2023-09-11 NOTE — PROGRESS NOTES
[facility] Hematology and Oncology Consult Note    Patient: Mary Lopez  MRN: [id number]  Date of Service: 08/18/2023      Reason for Visit    No chief complaint on file.        Assessment/Plan    Problem List Items Addressed This Visit          Digestive    Portal hypertensive gastropathy (H)    Secondary esophageal 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 (H)       Hematologic    Anemia due to unknown mechanism    Relevant Orders    Reticulocyte count (Completed)    Soluble transferrin receptor (Completed)    Haptoglobin (Completed)    Lactate Dehydrogenase (Completed)     Other Visit Diagnoses       Anemia, unspecified type    -  Primary    Relevant Orders    CBC with platelets and differential    Lab Blood Morphology Pathologist Review (Completed)    Direct antiglobulin test (Completed)    Ferritin (Completed)    Iron & Iron Binding Capacity (Completed)    Folate (Completed)    ABO/Rh type and screen (Completed)          Anemia and thrombocytopenia in the setting of alcoholic liver cirrhosis with portal hypertensive gastropathy and splenomegaly  Reviewed his labs in detail.  He has significant anemia along with mild to moderate thrombocytopenia.  Hemoglobin has been around 7-8.  Platelet count above 100,000.  Has had some nosebleeds.  No obvious GI bleeding noted.  Has required blood transfusion.  Appears weak and fatigued.  Also is jaundiced.  I explained to him that his anemia is probably secondary to severe liver disease resulting in bone marrow suppression and also to some extent hypersplenism.  Similarly his thrombocytopenia is again due to liver disease and increased consumption from the enlarged spleen.  However iron deficiency secondary to GI blood loss cannot be completely ruled out.      I will repeat his labs next week at [facility] including CBC, peripheral smear, haptoglobin, LDH, folic acid, soluble transfer receptor, iron studies reticulocyte count and SHEN.  We will arrange for blood transfusion  if needed at [place].  I will see him back in 4 weeks.    Alcoholic liver cirrhosis  He is meeting with gastroenterology in the next couple of weeks.  Currently on lactulose, Lasix and spironolactone.  He says he has quit drinking since June.  Management per GI team.    ECOG Performance    2 - Ambulatory and independent in all ADLs; cannot work; up > 50% of the time    Problem List    Patient Active Problem List   Diagnosis    Essential hypertension    Tobacco abuse disorder    Alcohol use    IFG (impaired fasting glucose)    Mild hyperlipidemia    Elevated liver enzymes    Morbid obesity (H)    Alcoholic cirrhosis of liver without ascites (H)    Alcoholic hepatitis without ascites    Portal hypertensive gastropathy (H)    Splenomegaly    Secondary esophageal 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 (H)    Anemia due to unknown mechanism    Thrombocytopenia (H)    Persistent insomnia    Bilateral leg edema    Epistaxis     ______________________________________________________________________________    Staging History     Cancer Staging   No matching staging information was found for the patient.      History of presenting illness:  Mary Lopez is a 52-year-old male with alcohol abuse resulting in alcoholic liver cirrhosis, anemia and thrombocytopenia, portal hypertensive gastropathy, fluid retention and multiple other medical issues who is being referred by his primary care for further evaluation management of anemia and thrombocytopenia in the setting.    He has had multiple hospitalization in the last month or so for weakness, dropping hemoglobin and fluid retention along with other issues all stemming from complications secondary to alcohol abuse resulting in alcoholic liver cirrhosis.  Has history of blood transfusion at the outside hospital.  Globin has been around 7-8.  CBC from yesterday shows a total white count of 12.8.  Hemoglobin is 7.2.  Platelet count of around 15.  MCV 99.  Elevated RDW.  Chemistry  showing elevated alkaline phosphatase of 270 and AST at 90.  Bilirubin high at 6.3 which is mostly indirect bilirubinemia.  He has had nosebleeds.  Also had upper endoscopy at [facility] on 1/26/2023 which showed grade 1 esophageal varices without any bleeding stigmata.  He had a CT scan in June which showed cirrhotic appearing liver with evidence of portal hypertensive gastropathy and splenomegaly.  Denies any black or tarry stools.  Has an appointment with gastroenterology in the near future.  Significant peripheral edema and is currently on Lasix and spironolactone.  Also is on lactulose.    Says he has quit drinking since June.  He is extremely weak and fatigued.  Denies any abdominal pain.  No fever.      Past History    Past Medical History:   Diagnosis Date    Alcohol use 03/11/2020    Alcoholic cirrhosis of liver without ascites (H) 07/13/2023    Alcoholic hepatitis without ascites 07/13/2023    Bilateral leg edema 07/25/2023    Concussion without loss of consciousness 03/11/2020    Elevated liver enzymes 05/03/2022    Essential hypertension 03/11/2020    IFG (impaired fasting glucose) 12/07/2021    Mild hyperlipidemia 12/07/2021    Other specified anemias 07/13/2023    Persistent insomnia 07/13/2023    Portal hypertension (H) 07/13/2023    Secondary esophageal 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 (H) 07/13/2023    Splenomegaly 07/13/2023    Thrombocytopenia (H) 07/13/2023    Tobacco abuse disorder 03/11/2020    Family History   Problem Relation Age of Onset    Chronic Obstructive Pulmonary Disease Mother     Lung Cancer Mother 81    Morbid Obesity Father     Diabetes Type 2  Brother       Past Surgical History:   Procedure Laterality Date    CHOLECYSTECTOMY  unknown    done at [facility]    Social History     Socioeconomic History    Marital status:      Spouse name: Not on file    Number of children: Not on file    Years of education: Not on file    Highest education level: Not on file    Occupational History    Not on file   Tobacco Use    Smoking status: Never     Passive exposure: Never    Smokeless tobacco: Current     Types: Chew   Vaping Use    Vaping Use: Never used   Substance and Sexual Activity    Alcohol use: Yes     Alcohol/week: 12.0 standard drinks of alcohol     Types: 12 Standard drinks or equivalent per week     Comment: 12 drinsk per week    Drug use: Not Currently    Sexual activity: Yes     Partners: Female     Birth control/protection: Male Surgical   Other Topics Concern    Parent/sibling w/ CABG, MI or angioplasty before 65F 55M? No   Social History Narrative    Not on file     Social Determinants of Health     Financial Resource Strain: Not on file   Food Insecurity: Not on file   Transportation Needs: Not on file   Physical Activity: Not on file   Stress: Not on file   Social Connections: Not on file   Intimate Partner Violence: Not on file   Housing Stability: Not on file        Allergies    Allergies   Allergen Reactions    Fish Swelling    Food      baked beans       Review of Systems    Pertinent items are noted in HPI.      Physical Exam        8/29/2023    11:21 AM   Oncology Vitals   Pain Score 0 (None)       General: alert and cooperative, jaundiced, appears frail  HEENT: Head: Normal, normocephalic, atraumatic.  Eye: Scleral icterus noted  Chest: Clear to auscultation bilaterally  Cardiac: S1, S2 normal, regular rate and rhythm  Abdomen: Soft, distended, ascites noted  Extremities: Significant peripheral edema noted  Skin: Jaundiced no rashes  CNS: Alert and oriented x3, neurologic exam grossly normal.  Lymphatics: No significant adenopathy      Lab Results        Imaging Results    No results found.    A total of 60 minutes was spent today on this visit including face to face conversation with the patient, EMR review (labs, imaging studies, pathology reports and outside records), counseling and care co-ordination and documentation.    Signed by: Jose  MD Alexander

## 2023-09-11 NOTE — PROGRESS NOTES
Clinical Product Navigator RN reviewed chart; patient on payer product coverage.  Review results: Obtaining further information to determine needs and next steps.    RN Clinical Product Navigator will follow patient through hospital discharge.    Francisca Caldwell RN   Clinical Product Navigator   [email or web address]   Office: [phone]

## 2023-09-13 ENCOUNTER — LAB (OUTPATIENT)
Dept: LAB | Facility: CLINIC | Age: 53
End: 2023-09-13
Payer: COMMERCIAL

## 2023-09-13 DIAGNOSIS — D64.9 ANEMIA DUE TO UNKNOWN MECHANISM: ICD-10-CM

## 2023-09-13 DIAGNOSIS — K70.30 ALCOHOLIC CIRRHOSIS OF LIVER WITHOUT ASCITES (H): ICD-10-CM

## 2023-09-13 LAB
ACANTHOCYTES BLD QL SMEAR: SLIGHT
ALBUMIN SERPL BCG-MCNC: 2.7 G/DL (ref 3.5–5.2)
ALP SERPL-CCNC: 362 U/L (ref 40–129)
ALT SERPL W P-5'-P-CCNC: 83 U/L (ref 0–70)
ANION GAP SERPL CALCULATED.3IONS-SCNC: 11 MMOL/L (ref 7–15)
AST SERPL W P-5'-P-CCNC: 93 U/L (ref 0–45)
BASOPHILS # BLD MANUAL: 0.1 10E3/UL (ref 0–0.2)
BASOPHILS NFR BLD MANUAL: 1 %
BILIRUB DIRECT SERPL-MCNC: 4.26 MG/DL (ref 0–0.3)
BILIRUB SERPL-MCNC: 9.3 MG/DL
BUN SERPL-MCNC: 22.3 MG/DL (ref 6–20)
CALCIUM SERPL-MCNC: 9.2 MG/DL (ref 8.6–10)
CHLORIDE SERPL-SCNC: 89 MMOL/L (ref 98–107)
CREAT SERPL-MCNC: 0.97 MG/DL (ref 0.67–1.17)
DEPRECATED HCO3 PLAS-SCNC: 26 MMOL/L (ref 22–29)
EGFRCR SERPLBLD CKD-EPI 2021: >90 ML/MIN/1.73M2
EOSINOPHIL # BLD MANUAL: 0.4 10E3/UL (ref 0–0.7)
EOSINOPHIL NFR BLD MANUAL: 3 %
ERYTHROCYTE [DISTWIDTH] IN BLOOD BY AUTOMATED COUNT: 18.8 % (ref 10–15)
GLUCOSE SERPL-MCNC: 268 MG/DL (ref 70–99)
HCT VFR BLD AUTO: 27.1 % (ref 40–53)
HGB BLD-MCNC: 9.2 G/DL (ref 13.3–17.7)
INR PPP: 2.67 (ref 0.85–1.15)
LAB DIRECTOR COMMENTS: NORMAL
LAB DIRECTOR DISCLAIMER: NORMAL
LAB DIRECTOR INTERPRETATION: NORMAL
LAB DIRECTOR METHODOLOGY: NORMAL
LAB DIRECTOR RESULTS: NORMAL
LYMPHOCYTES # BLD MANUAL: 2.5 10E3/UL (ref 0.8–5.3)
LYMPHOCYTES NFR BLD MANUAL: 17 %
MCH RBC QN AUTO: 34.6 PG (ref 26.5–33)
MCHC RBC AUTO-ENTMCNC: 33.9 G/DL (ref 31.5–36.5)
MCV RBC AUTO: 102 FL (ref 78–100)
MONOCYTES # BLD MANUAL: 1.5 10E3/UL (ref 0–1.3)
MONOCYTES NFR BLD MANUAL: 10 %
MYELOCYTES # BLD MANUAL: 0.4 10E3/UL
MYELOCYTES NFR BLD MANUAL: 3 %
NEUTROPHILS # BLD MANUAL: 9.7 10E3/UL (ref 1.6–8.3)
NEUTROPHILS NFR BLD MANUAL: 66 %
PLAT MORPH BLD: ABNORMAL
PLATELET # BLD AUTO: 80 10E3/UL (ref 150–450)
POTASSIUM SERPL-SCNC: 3.5 MMOL/L (ref 3.4–5.3)
PROT SERPL-MCNC: 7.8 G/DL (ref 6.4–8.3)
RBC # BLD AUTO: 2.66 10E6/UL (ref 4.4–5.9)
RBC MORPH BLD: ABNORMAL
SODIUM SERPL-SCNC: 126 MMOL/L (ref 136–145)
SPECIMEN DESCRIPTION: NORMAL
WBC # BLD AUTO: 14.7 10E3/UL (ref 4–11)

## 2023-09-13 PROCEDURE — 86364 TISS TRNSGLTMNASE EA IG CLAS: CPT | Performed by: PHYSICIAN ASSISTANT

## 2023-09-13 PROCEDURE — 99000 SPECIMEN HANDLING OFFICE-LAB: CPT

## 2023-09-13 PROCEDURE — 36415 COLL VENOUS BLD VENIPUNCTURE: CPT

## 2023-09-13 PROCEDURE — 80053 COMPREHEN METABOLIC PANEL: CPT

## 2023-09-13 PROCEDURE — 82784 ASSAY IGA/IGD/IGG/IGM EACH: CPT | Performed by: PHYSICIAN ASSISTANT

## 2023-09-13 PROCEDURE — 85007 BL SMEAR W/DIFF WBC COUNT: CPT

## 2023-09-13 PROCEDURE — 81332 SERPINA1 GENE: CPT | Mod: 90

## 2023-09-13 PROCEDURE — 82103 ALPHA-1-ANTITRYPSIN TOTAL: CPT | Mod: 90

## 2023-09-13 PROCEDURE — 83516 IMMUNOASSAY NONANTIBODY: CPT | Mod: 90

## 2023-09-13 PROCEDURE — 86256 FLUORESCENT ANTIBODY TITER: CPT | Mod: 90

## 2023-09-13 PROCEDURE — G0452 MOLECULAR PATHOLOGY INTERPR: HCPCS | Mod: 26 | Performed by: PATHOLOGY

## 2023-09-13 PROCEDURE — 81256 HFE GENE: CPT

## 2023-09-13 PROCEDURE — 85610 PROTHROMBIN TIME: CPT

## 2023-09-13 PROCEDURE — 82248 BILIRUBIN DIRECT: CPT

## 2023-09-13 PROCEDURE — 85027 COMPLETE CBC AUTOMATED: CPT

## 2023-09-13 NOTE — PROGRESS NOTES
Clinical Product Navigator RN reviewed chart; patient on payer product coverage.  Review results: Not met any any referral criteria at this time.  Will monitor for future needs.    Patient was at [facility] 9/6/23 - 9/11/23 for decompensated liver disease. Patient has follow up labs scheduled for today and PCP follow up scheduled for tomorrow 9/14/23. PCP may order Primary Care Coordination referral as they deem appropriate.     Francisca Caldwell RN   Clinical Product Navigator   [email or web address]   Office: [phone]

## 2023-09-14 ENCOUNTER — OFFICE VISIT (OUTPATIENT)
Dept: FAMILY MEDICINE | Facility: CLINIC | Age: 53
End: 2023-09-14
Payer: COMMERCIAL

## 2023-09-14 VITALS
HEIGHT: 69 IN | SYSTOLIC BLOOD PRESSURE: 93 MMHG | DIASTOLIC BLOOD PRESSURE: 57 MMHG | WEIGHT: 207.2 LBS | OXYGEN SATURATION: 98 % | HEART RATE: 110 BPM | RESPIRATION RATE: 19 BRPM | TEMPERATURE: 98.1 F | BODY MASS INDEX: 30.69 KG/M2

## 2023-09-14 DIAGNOSIS — K74.60 DECOMPENSATED HEPATIC CIRRHOSIS (H): ICD-10-CM

## 2023-09-14 DIAGNOSIS — D64.9 ANEMIA DUE TO UNKNOWN MECHANISM: ICD-10-CM

## 2023-09-14 DIAGNOSIS — G47.00 PERSISTENT INSOMNIA: ICD-10-CM

## 2023-09-14 DIAGNOSIS — D69.6 THROMBOCYTOPENIA (H): ICD-10-CM

## 2023-09-14 DIAGNOSIS — K31.89 PORTAL HYPERTENSIVE GASTROPATHY (H): ICD-10-CM

## 2023-09-14 DIAGNOSIS — R16.1 SPLENOMEGALY: ICD-10-CM

## 2023-09-14 DIAGNOSIS — Z00.00 ROUTINE GENERAL MEDICAL EXAMINATION AT A HEALTH CARE FACILITY: Primary | ICD-10-CM

## 2023-09-14 DIAGNOSIS — K72.90 DECOMPENSATED HEPATIC CIRRHOSIS (H): ICD-10-CM

## 2023-09-14 DIAGNOSIS — R73.01 IFG (IMPAIRED FASTING GLUCOSE): ICD-10-CM

## 2023-09-14 DIAGNOSIS — E87.1 HYPONATREMIA: ICD-10-CM

## 2023-09-14 DIAGNOSIS — S22.32XD CLOSED FRACTURE OF ONE RIB OF LEFT SIDE WITH ROUTINE HEALING, SUBSEQUENT ENCOUNTER: ICD-10-CM

## 2023-09-14 DIAGNOSIS — K76.6 PORTAL HYPERTENSIVE GASTROPATHY (H): ICD-10-CM

## 2023-09-14 DIAGNOSIS — I85.10 SECONDARY ESOPHAGEAL VARICES WITHOUT BLEEDING (H): ICD-10-CM

## 2023-09-14 PROBLEM — S22.32XA CLOSED FRACTURE OF ONE RIB OF LEFT SIDE: Status: ACTIVE | Noted: 2023-09-14

## 2023-09-14 LAB
IGA SERPL-MCNC: 861 MG/DL (ref 84–499)
TTG IGA SER-ACNC: 2.2 U/ML
TTG IGG SER-ACNC: 2 U/ML

## 2023-09-14 PROCEDURE — 99396 PREV VISIT EST AGE 40-64: CPT | Performed by: INTERNAL MEDICINE

## 2023-09-14 PROCEDURE — 99214 OFFICE O/P EST MOD 30 MIN: CPT | Mod: 25 | Performed by: INTERNAL MEDICINE

## 2023-09-14 RX ORDER — TORSEMIDE 10 MG/1
10 TABLET ORAL 2 TIMES DAILY
COMMUNITY
Start: 2023-09-14 | End: 2023-10-16

## 2023-09-14 ASSESSMENT — ENCOUNTER SYMPTOMS
HEMATURIA: 0
NERVOUS/ANXIOUS: 0
SORE THROAT: 0
ABDOMINAL PAIN: 0
WEAKNESS: 1
ARTHRALGIAS: 0
CONSTIPATION: 0
NAUSEA: 0
SHORTNESS OF BREATH: 0
DIARRHEA: 0
DYSURIA: 0
DIZZINESS: 0
FREQUENCY: 0
MYALGIAS: 0
COUGH: 0
HEADACHES: 0
JOINT SWELLING: 0
PALPITATIONS: 0
EYE PAIN: 0
FEVER: 0
HEMATOCHEZIA: 0
PARESTHESIAS: 0
CHILLS: 0
HEARTBURN: 0

## 2023-09-14 ASSESSMENT — PAIN SCALES - GENERAL: PAINLEVEL: NO PAIN (0)

## 2023-09-14 NOTE — PROGRESS NOTES
SUBJECTIVE:   CC: Mary is an 52 year old who presents for preventative health visit.     52-year-old gentleman with known history of with known history of decompensated hepatic cirrhosis related to alcohol, hypertension, impaired fasting glucose, portal hypertension, secondary self vaginal varices, anemia, chronic insomnia has come in for a physical exam and follow-up of recent hospitalization.  He was hospitalized with severe hyponatremia with confusion.  He was given 3% saline and then discharged on sodium tablets.  Patient also had volume overload related to liver disease treated aggressively with furosemide and spironolactone combination which probably resulted in hyponatremia.  His weight at 1 time was 263 pounds and today he weighs 207 pounds.  The generalized edema that he had has pretty much resolved.  He feels weak and tired but otherwise doing okay.  Appetite is okay.  Continues to have issues with insomnia in spite of taking doxepin, melatonin and Flexeril at bedtime.  He denies dyspnea.  No abdominal pain.  Today he was accompanied by his wife.          9/14/2023     4:16 PM   Additional Questions   Roomed by Sandra ARRIETA   Accompanied by Gilda -spouse       Healthy Habits:     Getting at least 3 servings of Calcium per day:  NO    Bi-annual eye exam:  NO    Dental care twice a year:  NO    Sleep apnea or symptoms of sleep apnea:  Daytime drowsiness and Sleep apnea    Diet:  Regular (no restrictions)    Frequency of exercise:  None    Taking medications regularly:  Yes    Medication side effects:  None    Additional concerns today:  No          Social History     Tobacco Use     Smoking status: Never     Passive exposure: Never     Smokeless tobacco: Current     Types: Chew     Last attempt to quit: 1/1/2004   Substance Use Topics     Alcohol use: Yes     Alcohol/week: 12.0 standard drinks of alcohol     Types: 12 Standard drinks or equivalent per week     Comment: 12 drinsk per week             9/14/2023      4:11 PM   Alcohol Use   Prescreen: >3 drinks/day or >7 drinks/week? No       Last PSA:   Prostate Specific Antigen Screen   Date Value Ref Range Status   03/12/2022 1.41 0.00 - 4.00 ug/L Final       Reviewed orders with patient. Reviewed health maintenance and updated orders accordingly - Yes  BP Readings from Last 3 Encounters:   09/14/23 93/57   08/21/23 112/73   08/18/23 121/60    Wt Readings from Last 3 Encounters:   09/14/23 94 kg (207 lb 3.2 oz)   08/18/23 113.2 kg (249 lb 8 oz)   08/10/23 119.3 kg (263 lb)                  Patient Active Problem List   Diagnosis     Essential hypertension     Tobacco abuse disorder     Alcohol use     IFG (impaired fasting glucose)     Mild hyperlipidemia     Elevated liver enzymes     Morbid obesity (H)     Alcoholic cirrhosis of liver without ascites (H)     Alcoholic hepatitis without ascites     Portal hypertensive gastropathy (H)     Splenomegaly     Secondary esophageal 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 (H)     Anemia due to unknown mechanism     Thrombocytopenia (H)     Persistent insomnia     Bilateral leg edema     Epistaxis     Past Surgical History:   Procedure Laterality Date     CHOLECYSTECTOMY  unknown    done at [facility]       Social History     Tobacco Use     Smoking status: Never     Passive exposure: Never     Smokeless tobacco: Current     Types: Chew     Last attempt to quit: 1/1/2004   Substance Use Topics     Alcohol use: Yes     Alcohol/week: 12.0 standard drinks of alcohol     Types: 12 Standard drinks or equivalent per week     Comment: 12 drinsk per week     Family History   Problem Relation Age of Onset     Chronic Obstructive Pulmonary Disease Mother      Lung Cancer Mother 81     Morbid Obesity Father      Diabetes Father      Diabetes Type 2  Brother          Current Outpatient Medications   Medication Sig Dispense Refill     cyclobenzaprine (FLEXERIL) 10 MG tablet Take 1 tablet (10 mg) by mouth 3 times daily as needed for muscle spasms 30 tablet 1      doxepin (SINEQUAN) 10 MG capsule Take 1 capsule (10 mg) by mouth At Bedtime 60 capsule 1     ferrous sulfate (FEROSUL) 325 (65 Fe) MG tablet Take 1 tablet (325 mg) by mouth daily (with breakfast) 90 tablet 3     lactulose (CHRONULAC) 10 GM/15ML solution TAKE 30 MLS BY MOUTH 2 TIMES DAILY 473 mL 1     melatonin 10 MG TABS tablet Take 1 tablet (10 mg) by mouth nightly as needed for sleep       multivitamin w/minerals (THERA-VIT-M) tablet Take 1 tablet by mouth daily 90 tablet 1     omeprazole (PRILOSEC) 20 MG DR capsule Take 1 capsule (20 mg) by mouth 2 times daily 180 capsule 1     permethrin (ELIMITE) 5 % external cream Apply cream from head to toe (except the face); leave on for 8-14 hours then wash off with water; reapply in 1 week if live mites appear. 60 g 1     zinc oxide (DESITIN) 20 % external ointment Apply topically as needed for dry skin or irritation       torsemide (DEMADEX) 10 MG tablet Take 1 tablet (10 mg) by mouth 2 times daily       Allergies   Allergen Reactions     Fish Swelling     Food      baked beans       Reviewed and updated as needed this visit by clinical staff   Tobacco  Allergies  Meds              Reviewed and updated as needed this visit by Provider                 Past Medical History:   Diagnosis Date     Alcohol use 03/11/2020     Alcoholic cirrhosis of liver without ascites (H) 07/13/2023     Alcoholic hepatitis without ascites 07/13/2023     Bilateral leg edema 07/25/2023     Concussion without loss of consciousness 03/11/2020     Elevated liver enzymes 05/03/2022     Essential hypertension 03/11/2020     IFG (impaired fasting glucose) 12/07/2021     Mild hyperlipidemia 12/07/2021     Other specified anemias 07/13/2023     Persistent insomnia 07/13/2023     Portal hypertension (H) 07/13/2023     Secondary esophageal 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 (H) 07/13/2023     Splenomegaly 07/13/2023     Thrombocytopenia (H) 07/13/2023     Tobacco abuse disorder 03/11/2020      Past Surgical  "History:   Procedure Laterality Date     CHOLECYSTECTOMY  unknown    done at [facility]       Review of Systems   Constitutional:  Negative for chills and fever.   HENT:  Negative for congestion, ear pain, hearing loss and sore throat.    Eyes:  Negative for pain and visual disturbance.   Respiratory:  Negative for cough and shortness of breath.    Cardiovascular:  Negative for chest pain, palpitations and peripheral edema.   Gastrointestinal:  Negative for abdominal pain, constipation, diarrhea, heartburn, hematochezia and nausea.   Genitourinary:  Negative for dysuria, frequency, genital sores, hematuria, impotence, penile discharge and urgency.   Musculoskeletal:  Negative for arthralgias, joint swelling and myalgias.   Skin:  Negative for rash.   Neurological:  Positive for weakness. Negative for dizziness, headaches and paresthesias.   Psychiatric/Behavioral:  Negative for mood changes. The patient is not nervous/anxious.      CONSTITUTIONAL: NEGATIVE for fever, chills, change in weight  INTEGUMENTARY/SKIN: NEGATIVE for worrisome rashes, moles or lesions  EYES: NEGATIVE for vision changes or irritation  ENT: NEGATIVE for ear, mouth and throat problems  RESP: NEGATIVE for significant cough or SOB  CV: NEGATIVE for chest pain, palpitations or peripheral edema  GI: NEGATIVE for nausea, abdominal pain, heartburn, or change in bowel habits   male: negative for dysuria, hematuria, decreased urinary stream, erectile dysfunction, urethral discharge  MUSCULOSKELETAL: NEGATIVE for significant arthralgias or myalgia  NEURO: NEGATIVE for weakness, dizziness or paresthesias  ENDOCRINE: NEGATIVE for temperature intolerance, skin/hair changes  HEME/ALLERGY/IMMUNE: NEGATIVE for bleeding problems  PSYCHIATRIC: NEGATIVE for changes in mood or affect    OBJECTIVE:   Ht 1.74 m (5' 8.5\")   Wt 94 kg (207 lb 3.2 oz)   BMI 31.05 kg/m      Physical Exam  GENERAL: healthy, alert and no distress  EYES: Eyes grossly normal to " inspection and conjunctiva is icteric.  HENT: ear canals and TM's normal, nose and mouth without ulcers or lesions  NECK: no adenopathy, no asymmetry, masses, or scars and thyroid normal to palpation  RESP: lungs clear to auscultation - no rales, rhonchi or wheezes  CV: regular rate and rhythm, normal S1 S2, no S3 or S4, no murmur, click or rub, no peripheral edema and peripheral pulses strong  ABDOMEN: soft, nontender, bowel sounds normal, and spleen is palpable.  MS: no gross musculoskeletal defects noted, no edema  SKIN: no suspicious lesions or rashes  NEURO: Normal strength and tone, mentation intact and speech normal  PSYCH: mentation appears normal, affect normal/bright  LYMPH: no cervical, supraclavicular, axillary, or inguinal adenopathy  Genital exam: Scrotum and penis normal.  No evidence of hernia.  No scrotal edema.     Latest Reference Range & Units 09/13/23 10:28   Sodium 136 - 145 mmol/L 126 (L)   Potassium 3.4 - 5.3 mmol/L 3.5   Chloride 98 - 107 mmol/L 89 (L)   Carbon Dioxide (CO2) 22 - 29 mmol/L 26   Urea Nitrogen 6.0 - 20.0 mg/dL 22.3 (H)   Creatinine 0.67 - 1.17 mg/dL 0.97   GFR Estimate >60 mL/min/1.73m2 >90   Calcium 8.6 - 10.0 mg/dL 9.2   Anion Gap 7 - 15 mmol/L 11   Albumin 3.5 - 5.2 g/dL 2.7 (L)   Protein Total 6.4 - 8.3 g/dL 7.8   Alkaline Phosphatase 40 - 129 U/L 362 (H)   ALT 0 - 70 U/L 83 (H)   AST 0 - 45 U/L 93 (H)   Bilirubin Direct 0.00 - 0.30 mg/dL 4.26 (H)   Bilirubin Total <=1.2 mg/dL 9.3 (H)   Glucose 70 - 99 mg/dL 268 (H)   (L): Data is abnormally low  (H): Data is abnormally high     Latest Reference Range & Units 09/13/23 10:28   WBC 4.0 - 11.0 10e3/uL 14.7 (H)   Hemoglobin 13.3 - 17.7 g/dL 9.2 (L)   Hematocrit 40.0 - 53.0 % 27.1 (L)   Platelet Count 150 - 450 10e3/uL 80 (L)   RBC Count 4.40 - 5.90 10e6/uL 2.66 (L)   MCV 78 - 100 fL 102 (H)   MCH 26.5 - 33.0 pg 34.6 (H)   MCHC 31.5 - 36.5 g/dL 33.9   RDW 10.0 - 15.0 % 18.8 (H)   % Neutrophils % 66   % Lymphocytes % 17   %  Monocytes % 10   % Eosinophils % 3   % Basophils % 1   % Myelocytes % 3   Absolute Basophils 0.0 - 0.2 10e3/uL 0.1   Absolute Neutrophil 1.6 - 8.3 10e3/uL 9.7 (H)   Absolute Lymphocytes 0.8 - 5.3 10e3/uL 2.5   Absolute Monocytes 0.0 - 1.3 10e3/uL 1.5 (H)   Absolute Eosinophils 0.0 - 0.7 10e3/uL 0.4   (H): Data is abnormally high  (L): Data is abnormally low      ASSESSMENT/PLAN:     1.  Routine general medical exam completed today.  2.  Decompensated hepatic cirrhosis.  MELD-Na score 31.  No ascites based on recent abdominal ultrasound.  Edema markedly improved.  Weight down from 2 63-2.7.  Advised to continue with lactulose with a goal to have 3-4 bowel movements a day.  3.  Hyponatremia due to decompensated liver disease.  Currently being treated with sodium chloride tablets 2000 mg 3 times a day.  Blood pressure today is low at 93/57.  Midodrine could be considered.  Sodium 126 yesterday and if it still remains low or lower  will consider stopping torsemide.  BMP to be checked next week.  Hyponatremia is a poor prognostic indicator  4.  Impaired fasting glucose.  Blood sugar recently running in 200+ range probably because of the prednisone provided during recent hospitalization.  Last A1c 5.1 on 8/23/2023.  A1c may not be accurate due to anemia.  We will continue to keep an eye on the blood sugar.  5.  Anemia due to unknown mechanism.  In fact multifactorial including portal gastropathy.  Currently on iron therapy and hemoglobin yesterday 9.2 g/dL.  Will monitor weekly.  6.  Portal hypertensive gastropathy confirmed by upper endoscopy on 7/26/2023.  No active bleeding noted at that time.  Remains on omeprazole 20 mg twice a day  7.  Secondary esophageal 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.  1 esophageal varices noted and also gastroesophageal varices noted on endoscopy of 8/22/2023.  8.  Persistent insomnia currently being treated with low-dose doxepin, melatonin also takes excellent at bedtime.  9.  Closed fracture of one  "of the rib the left side of the chest secondary to fall few weeks ago.  Pain is getting little better.  10.  Splenomegaly.  11.  Thrombocytopenia secondary to liver disease.  Most recent platelet count 80,000 on 9/13/2023.      COUNSELING:   Reviewed preventive health counseling, as reflected in patient instructions       Regular exercise       Healthy diet/nutrition      BMI:   Estimated body mass index is 31.05 kg/m  as calculated from the following:    Height as of this encounter: 1.74 m (5' 8.5\").    Weight as of this encounter: 94 kg (207 lb 3.2 oz).   Weight management plan: Discussed healthy diet and exercise guidelines      He reports that he has never smoked. He has never been exposed to tobacco smoke. His smokeless tobacco use includes chew.            Jimmie Hoyt MD  [facility]  "

## 2023-09-15 PROBLEM — K72.90 DECOMPENSATED HEPATIC CIRRHOSIS (H): Status: ACTIVE | Noted: 2023-09-15

## 2023-09-15 PROBLEM — K74.60 DECOMPENSATED HEPATIC CIRRHOSIS (H): Status: ACTIVE | Noted: 2023-09-15

## 2023-09-15 PROBLEM — I10 ESSENTIAL HYPERTENSION: Status: RESOLVED | Noted: 2020-03-11 | Resolved: 2023-09-15

## 2023-09-15 PROBLEM — E87.1 HYPONATREMIA: Status: ACTIVE | Noted: 2023-09-15

## 2023-09-15 LAB
IGG SERPL-MCNC: 2966 MG/DL (ref 610–1616)
SMA IGG SER-ACNC: 70 UNITS

## 2023-09-16 LAB — SMOOTH MUSCLE IGG TITR SER: NORMAL {TITER}

## 2023-09-19 ENCOUNTER — MYC MEDICAL ADVICE (OUTPATIENT)
Dept: FAMILY MEDICINE | Facility: CLINIC | Age: 53
End: 2023-09-19

## 2023-09-19 ENCOUNTER — LAB (OUTPATIENT)
Dept: LAB | Facility: CLINIC | Age: 53
End: 2023-09-19
Payer: COMMERCIAL

## 2023-09-19 DIAGNOSIS — R07.89 LEFT-SIDED CHEST WALL PAIN: Primary | ICD-10-CM

## 2023-09-19 DIAGNOSIS — D64.9 ANEMIA DUE TO UNKNOWN MECHANISM: ICD-10-CM

## 2023-09-19 LAB
A1AT PHENOTYP SERPL-IMP: ABNORMAL
A1AT SERPL-MCNC: 97 MG/DL
A1AT SS SERPL-MCNC: NEGATIVE G/L
A1AT SZ SERPL-MCNC: ABNORMAL G/L
A1AT ZZ SERPL-MCNC: ABNORMAL G/L
ANION GAP SERPL CALCULATED.3IONS-SCNC: 6 MMOL/L (ref 7–15)
BASOPHILS # BLD AUTO: 0.1 10E3/UL (ref 0–0.2)
BASOPHILS NFR BLD AUTO: 1 %
BUN SERPL-MCNC: 13.6 MG/DL (ref 6–20)
CALCIUM SERPL-MCNC: 7.9 MG/DL (ref 8.6–10)
CHLORIDE SERPL-SCNC: 91 MMOL/L (ref 98–107)
CREAT SERPL-MCNC: 0.74 MG/DL (ref 0.67–1.17)
DEPRECATED HCO3 PLAS-SCNC: 26 MMOL/L (ref 22–29)
EGFRCR SERPLBLD CKD-EPI 2021: >90 ML/MIN/1.73M2
EOSINOPHIL # BLD AUTO: 0.2 10E3/UL (ref 0–0.7)
EOSINOPHIL NFR BLD AUTO: 1 %
ERYTHROCYTE [DISTWIDTH] IN BLOOD BY AUTOMATED COUNT: 17.6 % (ref 10–15)
GLUCOSE SERPL-MCNC: 329 MG/DL (ref 70–99)
HCT VFR BLD AUTO: 24.2 % (ref 40–53)
HGB BLD-MCNC: 8.3 G/DL (ref 13.3–17.7)
IMM GRANULOCYTES # BLD: 0.6 10E3/UL
IMM GRANULOCYTES NFR BLD: 4 %
LYMPHOCYTES # BLD AUTO: 1.4 10E3/UL (ref 0.8–5.3)
LYMPHOCYTES NFR BLD AUTO: 10 %
MCH RBC QN AUTO: 35.2 PG (ref 26.5–33)
MCHC RBC AUTO-ENTMCNC: 34.3 G/DL (ref 31.5–36.5)
MCV RBC AUTO: 103 FL (ref 78–100)
MONOCYTES # BLD AUTO: 1.4 10E3/UL (ref 0–1.3)
MONOCYTES NFR BLD AUTO: 10 %
NEUTROPHILS # BLD AUTO: 10.6 10E3/UL (ref 1.6–8.3)
NEUTROPHILS NFR BLD AUTO: 74 %
NRBC # BLD AUTO: 0 10E3/UL
NRBC BLD AUTO-RTO: 0 /100
PLATELET # BLD AUTO: 61 10E3/UL (ref 150–450)
POTASSIUM SERPL-SCNC: 3.5 MMOL/L (ref 3.4–5.3)
RBC # BLD AUTO: 2.36 10E6/UL (ref 4.4–5.9)
SODIUM SERPL-SCNC: 123 MMOL/L (ref 136–145)
SPECIMEN SOURCE: ABNORMAL
WBC # BLD AUTO: 14.2 10E3/UL (ref 4–11)

## 2023-09-19 PROCEDURE — 85025 COMPLETE CBC W/AUTO DIFF WBC: CPT

## 2023-09-19 PROCEDURE — 36415 COLL VENOUS BLD VENIPUNCTURE: CPT

## 2023-09-19 PROCEDURE — 80048 BASIC METABOLIC PNL TOTAL CA: CPT

## 2023-09-19 NOTE — TELEPHONE ENCOUNTER
See Naked message.     Routing to provider to review and advise.     BRIDGET Smith  [facility]

## 2023-09-20 ENCOUNTER — MYC MEDICAL ADVICE (OUTPATIENT)
Dept: FAMILY MEDICINE | Facility: CLINIC | Age: 53
End: 2023-09-20
Payer: COMMERCIAL

## 2023-09-21 ENCOUNTER — TELEPHONE (OUTPATIENT)
Dept: FAMILY MEDICINE | Facility: CLINIC | Age: 53
End: 2023-09-21
Payer: COMMERCIAL

## 2023-09-21 NOTE — LETTER
September 25, 2023      Mary Lopez  [address] [place] [address]        To Whom It May Concern:    Mary Lopez is under my care.  He is severely ill with a chronic disease.  He is currently not capable of work and would not be able to return to work in the near future.  Return to work date cannot be determined at this time.      Sincerely,        Jimmie Hoyt MD

## 2023-09-21 NOTE — TELEPHONE ENCOUNTER
Forms/Letter Request    Type of form/letter: [facility]    Have you been seen for this request: N/A    Do we have the form/letter: Yes: placed in providers forms basket for review    When is form/letter needed by: ASAP    How would you like the form/letter returned: Fax  [phone]

## 2023-09-23 ENCOUNTER — MYC MEDICAL ADVICE (OUTPATIENT)
Dept: FAMILY MEDICINE | Facility: CLINIC | Age: 53
End: 2023-09-23

## 2023-09-23 ENCOUNTER — ANCILLARY PROCEDURE (OUTPATIENT)
Dept: CT IMAGING | Facility: CLINIC | Age: 53
End: 2023-09-23
Attending: INTERNAL MEDICINE
Payer: COMMERCIAL

## 2023-09-23 DIAGNOSIS — R07.89 LEFT-SIDED CHEST WALL PAIN: ICD-10-CM

## 2023-09-23 PROCEDURE — 71250 CT THORAX DX C-: CPT | Performed by: RADIOLOGY

## 2023-09-25 NOTE — TELEPHONE ENCOUNTER
Provider has already made comments on chest CT results for 9/23/2023.    BRIDGET Vital  [facility] Primary Care Triage

## 2023-09-26 ENCOUNTER — LAB (OUTPATIENT)
Dept: LAB | Facility: CLINIC | Age: 53
End: 2023-09-26
Payer: COMMERCIAL

## 2023-09-26 DIAGNOSIS — R18.8 OTHER ASCITES: ICD-10-CM

## 2023-09-26 DIAGNOSIS — D64.9 ANEMIA DUE TO UNKNOWN MECHANISM: ICD-10-CM

## 2023-09-26 DIAGNOSIS — K70.30 ALCOHOLIC CIRRHOSIS OF LIVER WITHOUT ASCITES (H): ICD-10-CM

## 2023-09-26 LAB
ANION GAP SERPL CALCULATED.3IONS-SCNC: 8 MMOL/L (ref 7–15)
BASOPHILS NFR BLD AUTO: 1 %
BUN SERPL-MCNC: 10.6 MG/DL (ref 6–20)
CALCIUM SERPL-MCNC: 7.9 MG/DL (ref 8.6–10)
CHLORIDE SERPL-SCNC: 96 MMOL/L (ref 98–107)
CREAT SERPL-MCNC: 0.8 MG/DL (ref 0.67–1.17)
DEPRECATED HCO3 PLAS-SCNC: 25 MMOL/L (ref 22–29)
EGFRCR SERPLBLD CKD-EPI 2021: >90 ML/MIN/1.73M2
EOSINOPHIL NFR BLD AUTO: 2 %
ERYTHROCYTE [DISTWIDTH] IN BLOOD BY AUTOMATED COUNT: 17.4 % (ref 10–15)
GLUCOSE SERPL-MCNC: 285 MG/DL (ref 70–99)
HCT VFR BLD AUTO: 24.5 % (ref 40–53)
HGB BLD-MCNC: 8 G/DL (ref 13.3–17.7)
IMM GRANULOCYTES NFR BLD: 3 %
LYMPHOCYTES NFR BLD AUTO: 12 %
MCH RBC QN AUTO: 35.1 PG (ref 26.5–33)
MCHC RBC AUTO-ENTMCNC: 32.7 G/DL (ref 31.5–36.5)
MCV RBC AUTO: 108 FL (ref 78–100)
MONOCYTES NFR BLD AUTO: 10 %
NEUTROPHILS NFR BLD AUTO: 72 %
NRBC # BLD AUTO: 0 10E3/UL
NRBC BLD AUTO-RTO: 0 /100
PLATELET # BLD AUTO: 95 10E3/UL (ref 150–450)
POTASSIUM SERPL-SCNC: 3.4 MMOL/L (ref 3.4–5.3)
RBC # BLD AUTO: 2.28 10E6/UL (ref 4.4–5.9)
SODIUM SERPL-SCNC: 129 MMOL/L (ref 135–145)
WBC # BLD AUTO: 7.5 10E3/UL (ref 4–11)

## 2023-09-26 PROCEDURE — 82248 BILIRUBIN DIRECT: CPT

## 2023-09-26 PROCEDURE — 80053 COMPREHEN METABOLIC PANEL: CPT

## 2023-09-26 PROCEDURE — 85025 COMPLETE CBC W/AUTO DIFF WBC: CPT

## 2023-09-26 PROCEDURE — 36415 COLL VENOUS BLD VENIPUNCTURE: CPT

## 2023-09-26 NOTE — TELEPHONE ENCOUNTER
Routing to provider to review. Please advise if you would like patient to schedule an appointment to discuss this further.     Celsa Jiménez, EDDAN, RN   [facility]

## 2023-09-26 NOTE — TELEPHONE ENCOUNTER
Letter mailed to pt per prov note. faxed completed forms to [employer] at [phone] and faxed request for medical records to our records department to process and send them..    Pt notified via [employer].  Kiera Aguilera CMA

## 2023-09-27 ENCOUNTER — VIRTUAL VISIT (OUTPATIENT)
Dept: GASTROENTEROLOGY | Facility: CLINIC | Age: 53
End: 2023-09-27
Attending: PHYSICIAN ASSISTANT
Payer: COMMERCIAL

## 2023-09-27 DIAGNOSIS — Z14.8 CARRIER OF HEMOCHROMATOSIS HFE GENE MUTATION: ICD-10-CM

## 2023-09-27 DIAGNOSIS — K70.30 ALCOHOLIC CIRRHOSIS OF LIVER WITHOUT ASCITES (H): Primary | ICD-10-CM

## 2023-09-27 DIAGNOSIS — Z14.8 ALPHA-1-ANTITRYPSIN DEFICIENCY CARRIER: ICD-10-CM

## 2023-09-27 DIAGNOSIS — R60.0 BILATERAL LEG EDEMA: ICD-10-CM

## 2023-09-27 LAB
ALBUMIN SERPL BCG-MCNC: 2.2 G/DL (ref 3.5–5.2)
ALP SERPL-CCNC: 216 U/L (ref 40–129)
ALT SERPL W P-5'-P-CCNC: 39 U/L (ref 0–70)
AST SERPL W P-5'-P-CCNC: 73 U/L (ref 0–45)
BILIRUB DIRECT SERPL-MCNC: 4.67 MG/DL (ref 0–0.3)
BILIRUB SERPL-MCNC: 9.3 MG/DL
PROT SERPL-MCNC: 6.2 G/DL (ref 6.4–8.3)

## 2023-09-27 PROCEDURE — 99215 OFFICE O/P EST HI 40 MIN: CPT | Mod: VID | Performed by: PHYSICIAN ASSISTANT

## 2023-09-27 PROCEDURE — 99417 PROLNG OP E/M EACH 15 MIN: CPT | Performed by: PHYSICIAN ASSISTANT

## 2023-09-27 NOTE — PROGRESS NOTES
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  Cirrhosis complicated by significant fluid overload and hyponatremia.    He continues to have significant liver dysfunction.  His total bilirubin is primarily indirect, which is likely more related to hemolysis from his liver disease. Most recent MELD 3.0 30, driven by sodium 126, T. bili 9.3, albumin 2.7 INR of 2.67.  Discussed importance of good nutrition and maintaining strength while he recovers.    #Hyponatremia in the setting of cirrhosis with significant fluid overload:  - Discussed that he should now stop salt tablets which were recently started in the hospital.  - Should also start reducing sodium in the diet  - Continue 2000 mL fluid restriction  - Monitor BMP in 1 week  - Also needs optimization of blood glucose as this is likely contributing to hyponatremia (was given prednisone during admission, last Hga1c in August was normal).     # Lower extremity edema/Moderate ascites on recent CT imaging  # Pleural effursions   -Continue torsemide 10 mg twice daily   -Continue 20 mEq potassium chloride daily    # Anemia, multi-factorial (haptaglobin low, LDH high): trending up to 8.0 yesterday  - Trend hemoglobin     # HCC, up-to-date:  - Continue routine HCC screening every 6 months, next ultrasound due December 2023     #Hepatic encephalopathy, well controlled:   - Continue lactulose (adjust dose to 2-3 bowel movements a day)     #History of alcohol abuse:  - No alcohol since June 25, 2023.    - Congratulated on his efforts with maintaining sobriety   - Absolute sobriety from alcohol  - Recommend some type of behavior therapy to help maintain  sobriety long-term.  Discussed also obtaining Rule 25 and ATILIO treatment as indicated, this will also be required if liver transplant evaluation is needed.    #Deconditioning:  - Recommend physical therapy and see    # Follow up with transplant hepatologist in 2-3 months     Sher Dutton PA-C   [facility] Hepatology clinic    Total time for E/M services performed on the date of the encounter 60minutes.  This included review of previous: clinic visits, hospital records, lab results, imaging studies, and procedural documentation. Time also includes patient visit, documentation and discussion with other providers.  The findings from this review are summarized in the above note.   -----------------------------------------------------       HPI:   Mary Lopez is a 52 year old male presenting for follow-up. Accompanied by his wife today.     ETOH Cirrhosis  Complicated by:  - No history of ascites   - History of GI bleed.   EGD: grade 1 esophageal varices, moderate PHG with oozing and type II GOV 2   HCC: US abdomen liver Doppler in September 2023    Patient was last seen by me on 8/24/2023.  He was recently hospitalized at [facility] for 8 days for severe hyponatremia (Admission sodium 120), severe alcohol hepatitis, ANGEL hyperkalemia and severe malnutrition.    Back on turosemide 10 mg twice daily, yesterday. Currently on sodium tablets Na 1 gm 3 times a day. Lost weight, start increasing weight again.  Was told that lower lobes have collapsed. Currently on fluid restriction 1800 mg daily.     Start feeling some fluid in abdomen. Appetite is good. Swelling in legs is more. No problems with confusion, has 2-3 bowel movements a day.     Walk up and down the stairs   Shortness of breath. Started 2-3 days ago .    3 seconds / disillusion and then go.   Have two to three bowel movements a day.     Get up 3 times night.     Patient also denies melena, hematochezia or hematemesis. Patient denies  weight loss, fevers, sweats or chills.    No CD treatment in the past. Denies cravings. Last drank June 25th, 2023. No therapy. Negative Peth 7/26/2023    Previous work-up:   Lab Results   Component Value Date    AUSAB 2.19 08/17/2023    KE 4,261 (H) 08/23/2023    IRONSAT  08/23/2023      Comment:      Unable to calculate:  UIBC or Iron value is outside detectable level.    TTG 2.2 09/13/2023    TTGG 2.0 09/13/2023     (H) 09/13/2023    SMOOTHMUS 70 (H) 09/13/2023    V3NSNNM Whole Blood 09/13/2023    A1A 97 09/13/2023    O4AWSJY Negative 09/13/2023    M9HESRY Heterozygous (A) 09/13/2023    C0YUVWDD Not Applicable 09/13/2023    P7GZSGRYC See Note 09/13/2023    MITM2 1.2 08/23/2023    TSH 2.18 03/12/2022    CHOL 203 (H) 03/12/2022    HDL 71 03/12/2022     (H) 03/12/2022    TRIG 151 (H) 03/12/2022    A1C 5.1 08/23/2023      Lab Results   Component Value Date    SPECDES  09/13/2023     Blood: ACD      LDRESULTS  09/13/2023     HEMOCHROMATOSIS RESULTS    HFE Gene C282Y (G845A) RESULTS:    C282Y Mutation Interpretation: HETEROZYGOTE    HFE Gene H63D (C187G) RESULTS:    H63D Mutation Interpretation: NORMAL    HFE Gene S65C (A193T) RESULTS:    S65C Mutation Interpretation: NORMAL        Recent Labs   Lab Test 09/13/23  1028 08/17/23  1112 08/10/23  1107 07/25/23  1702 07/15/23  1048 09/03/22  1006 03/12/22  1006   ALKPHOS 362* 270* 235* 214* 205* 135 120   ALT 83* 42 45 38 32 84* 93*   AST 93* 90* 105* 97* 94* 167* 186*   BILITOTAL 9.3* 6.3* 7.9* 7.1* 7.3* 1.3 0.8             Medications:     Carrier for HFE mutation (C282Y) and MZ heterozygous     Current Outpatient Medications   Medication    cyclobenzaprine (FLEXERIL) 10 MG tablet    doxepin (SINEQUAN) 10 MG capsule    ferrous sulfate (FEROSUL) 325 (65 Fe) MG tablet    lactulose (CHRONULAC) 10 GM/15ML solution    melatonin 10 MG TABS tablet    multivitamin w/minerals (THERA-VIT-M) tablet    omeprazole (PRILOSEC) 20 MG DR capsule    torsemide (DEMADEX) 10  MG tablet    zinc oxide (DESITIN) 20 % external ointment     No current facility-administered medications for this visit.            Review of Systems:   10 points ROS was obtained and highlighted in the HPI, otherwise negative.          Physical Exam:   GENERAL: healthy, alert and no distress  EYES: Eyes grossly normal to inspection, conjunctivae and sclerae normal  RESP: no audible wheeze, cough, or visible cyanosis.  No visible retractions or increased work of breathing.  Able to speak fully in complete sentences  NEURO: Cranial nerves grossly intact, mentation intact and speech normal  PSYCH: mentation appears normal, affect normal/bright, judgement and insight intact, normal speech and appearance well-groomed         Data:   Reviewed in person and significant for:    Lab Results   Component Value Date     09/26/2023     07/05/2020      Lab Results   Component Value Date    POTASSIUM 3.4 09/26/2023    POTASSIUM 3.8 03/12/2022    POTASSIUM 4.2 07/05/2020     Lab Results   Component Value Date    CHLORIDE 96 09/26/2023    CHLORIDE 101 03/12/2022    CHLORIDE 102 07/05/2020     Lab Results   Component Value Date    CO2 25 09/26/2023    CO2 25 03/12/2022    CO2 28 07/05/2020     Lab Results   Component Value Date    BUN 10.6 09/26/2023    BUN 11 03/12/2022    BUN 13 07/05/2020     Lab Results   Component Value Date    CR 0.80 09/26/2023    CR 0.95 07/05/2020       Lab Results   Component Value Date    WBC 7.5 09/26/2023    WBC 10.8 12/19/2006     Lab Results   Component Value Date    HGB 8.0 09/26/2023    HGB 16.9 12/19/2006     Lab Results   Component Value Date    HCT 24.5 09/26/2023    HCT 47.5 12/19/2006     Lab Results   Component Value Date     09/26/2023    MCV 85 12/19/2006     Lab Results   Component Value Date    PLT 95 09/26/2023     12/19/2006       Lab Results   Component Value Date    AST 93 09/13/2023    AST 52 12/19/2006     Lab Results   Component Value Date    ALT 83  09/13/2023    ALT 50 12/19/2006     Lab Results   Component Value Date    BILICONJ 0.0 12/19/2006      Lab Results   Component Value Date    BILITOTAL 9.3 09/13/2023    BILITOTAL 0.8 12/19/2006       Lab Results   Component Value Date    ALBUMIN 2.7 09/13/2023    ALBUMIN 4.0 09/03/2022    ALBUMIN 5.2 12/19/2006     Lab Results   Component Value Date    PROTTOTAL 7.8 09/13/2023    PROTTOTAL 9.7 12/19/2006      Lab Results   Component Value Date    ALKPHOS 362 09/13/2023    ALKPHOS 117 12/19/2006       Lab Results   Component Value Date    INR 2.67 09/13/2023     EXAM: DOPPLER SONOGRAM OF LIVER     DATE: 9/5/2023 6:40 PM     CLINICAL: E87.1 Hypo-osmolality and hyponatremia. Elevated liver function tests.     COMPARISON: Renal sonogram of 7/27/2023; CT scan of abdomen and pelvis done 6/26/2023.     TECHNIQUE: Real-time grayscale, color Doppler, and spectral Doppler waveform analysis used to evaluate major upper abdominal vascular structures with particular attention to the liver and spleen.     FINDINGS: The liver has nodular contours in keeping with known hepatic cirrhosis. The portal vein is patent but with hepatofugal flow; flow in the right branch of the portal vein is also retrograde, though antegrade in the left branch of the portal vein. The splenic vein has retrograde flow.     Hepatic artery patent. The left, middle, and right hepatic veins are patent.     Inferior vena cava and aorta are patent.     No ascites seen.

## 2023-09-27 NOTE — PROGRESS NOTES
Virtual Visit Details    Type of service:  Video Visit   2:47 pm - 3:15 pm  Originating Location (pt. Location): Home    Distant Location (provider location):  Off-site  Platform used for Video Visit: Yenifer

## 2023-09-27 NOTE — NURSING NOTE
Is the patient currently in the state of [place]? YES    Visit mode:VIDEO    If the visit is dropped, the patient can be reconnected by: VIDEO VISIT: Text to cell phone:   Telephone Information:   Mobile [phone]       Will anyone else be joining the visit? YES: How would they like to receive their invitation? Send to e-mail: PT's wife will be on camera  (If patient encounters technical issues they should call [phone])    How would you like to obtain your AVS? MyChart    Are changes needed to the allergy or medication list? No    Reason for visit: RECHECK    Casper HOOVERF

## 2023-09-27 NOTE — LETTER
9/27/2023         RE: Mary Lopez  [address] [place] [address]        Dear Colleague,    Thank you for referring your patient, Mary Lopez, to the [facility] [place]. Please see a copy of my visit note below.    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  Cirrhosis complicated by significant fluid overload and hyponatremia.    He continues to have significant liver dysfunction.  His total bilirubin is primarily indirect, which is likely more related to hemolysis from his liver disease. Most recent MELD 3.0 30, driven by sodium 126, T. bili 9.3, albumin 2.7 INR of 2.67.  Discussed importance of good nutrition and maintaining strength while he recovers.    #Hyponatremia in the setting of cirrhosis with significant fluid overload:  - Discussed that he should now stop salt tablets which were recently started in the hospital.  - Should also start reducing sodium in the diet  - Continue 2000 mL fluid restriction  - Monitor BMP in 1 week  - Also needs optimization of blood glucose as this is likely contributing to hyponatremia (was given prednisone during admission, last Hga1c in August was normal).     # Lower extremity edema/Moderate ascites on recent CT imaging  # Pleural effursions   -Continue torsemide 10 mg twice daily   -Continue 20 mEq potassium chloride daily    # Anemia, multi-factorial (haptaglobin low, LDH high): trending up to 8.0 yesterday  - Trend hemoglobin     # HCC, up-to-date:  - Continue routine HCC screening every 6 months, next ultrasound due December 2023     #Hepatic encephalopathy, well controlled:   - Continue  lactulose (adjust dose to 2-3 bowel movements a day)     #History of alcohol abuse:  - No alcohol since June 25, 2023.    - Congratulated on his efforts with maintaining sobriety   - Absolute sobriety from alcohol  - Recommend some type of behavior therapy to help maintain sobriety long-term.  Discussed also obtaining Rule 25 and ATILIO treatment as indicated, this will also be required if liver transplant evaluation is needed.    #Deconditioning:  - Recommend physical therapy and see    # Follow up with transplant hepatologist in 2-3 months     Sher Dutton PA-C   [facility]    Total time for E/M services performed on the date of the encounter 60minutes.  This included review of previous: clinic visits, hospital records, lab results, imaging studies, and procedural documentation. Time also includes patient visit, documentation and discussion with other providers.  The findings from this review are summarized in the above note.   -----------------------------------------------------       HPI:   Mary Lopez is a 52 year old male presenting for follow-up. Accompanied by his wife today.     ETOH Cirrhosis  Complicated by:  - No history of ascites   - History of GI bleed.   EGD: grade 1 esophageal varices, moderate PHG with oozing and type II GOV 2   HCC: US abdomen liver Doppler in September 2023    Patient was last seen by me on 8/24/2023.  He was recently hospitalized at [facility] for 8 days for severe hyponatremia (Admission sodium 120), severe alcohol hepatitis, ANGEL hyperkalemia and severe malnutrition.    Back on turosemide 10 mg twice daily, yesterday. Currently on sodium tablets Na 1 gm 3 times a day. Lost weight, start increasing weight again.  Was told that lower lobes have collapsed. Currently on fluid restriction 1800 mg daily.     Start feeling some fluid in abdomen. Appetite is good. Swelling in legs is more. No problems with confusion, has 2-3 bowel  movements a day.     Walk up and down the stairs   Shortness of breath. Started 2-3 days ago .    3 seconds / disillusion and then go.   Have two to three bowel movements a day.     Get up 3 times night.     Patient also denies melena, hematochezia or hematemesis. Patient denies weight loss, fevers, sweats or chills.    No CD treatment in the past. Denies cravings. Last drank June 25th, 2023. No therapy. Negative Peth 7/26/2023    Previous work-up:   Lab Results   Component Value Date    AUSAB 2.19 08/17/2023    KE 4,261 (H) 08/23/2023    IRONSAT  08/23/2023      Comment:      Unable to calculate:  UIBC or Iron value is outside detectable level.    TTG 2.2 09/13/2023    TTGG 2.0 09/13/2023     (H) 09/13/2023    SMOOTHMUS 70 (H) 09/13/2023    Q0EZUXK Whole Blood 09/13/2023    A1A 97 09/13/2023    B1IJKZC Negative 09/13/2023    U7XTALE Heterozygous (A) 09/13/2023    F5UHZVNJ Not Applicable 09/13/2023    Q4CNKILLL See Note 09/13/2023    MITM2 1.2 08/23/2023    TSH 2.18 03/12/2022    CHOL 203 (H) 03/12/2022    HDL 71 03/12/2022     (H) 03/12/2022    TRIG 151 (H) 03/12/2022    A1C 5.1 08/23/2023      Lab Results   Component Value Date    SPECDES  09/13/2023     Blood: ACD      LDRESULTS  09/13/2023     HEMOCHROMATOSIS RESULTS    HFE Gene C282Y (G845A) RESULTS:    C282Y Mutation Interpretation: HETEROZYGOTE    HFE Gene H63D (C187G) RESULTS:    H63D Mutation Interpretation: NORMAL    HFE Gene S65C (A193T) RESULTS:    S65C Mutation Interpretation: NORMAL        Recent Labs   Lab Test 09/13/23  1028 08/17/23  1112 08/10/23  1107 07/25/23  1702 07/15/23  1048 09/03/22  1006 03/12/22  1006   ALKPHOS 362* 270* 235* 214* 205* 135 120   ALT 83* 42 45 38 32 84* 93*   AST 93* 90* 105* 97* 94* 167* 186*   BILITOTAL 9.3* 6.3* 7.9* 7.1* 7.3* 1.3 0.8             Medications:     Carrier for HFE mutation (C282Y) and MZ heterozygous     Current Outpatient Medications   Medication    cyclobenzaprine (FLEXERIL) 10 MG tablet     doxepin (SINEQUAN) 10 MG capsule    ferrous sulfate (FEROSUL) 325 (65 Fe) MG tablet    lactulose (CHRONULAC) 10 GM/15ML solution    melatonin 10 MG TABS tablet    multivitamin w/minerals (THERA-VIT-M) tablet    omeprazole (PRILOSEC) 20 MG DR capsule    torsemide (DEMADEX) 10 MG tablet    zinc oxide (DESITIN) 20 % external ointment     No current facility-administered medications for this visit.            Review of Systems:   10 points ROS was obtained and highlighted in the HPI, otherwise negative.          Physical Exam:   GENERAL: healthy, alert and no distress  EYES: Eyes grossly normal to inspection, conjunctivae and sclerae normal  RESP: no audible wheeze, cough, or visible cyanosis.  No visible retractions or increased work of breathing.  Able to speak fully in complete sentences  NEURO: Cranial nerves grossly intact, mentation intact and speech normal  PSYCH: mentation appears normal, affect normal/bright, judgement and insight intact, normal speech and appearance well-groomed         Data:   Reviewed in person and significant for:    Lab Results   Component Value Date     09/26/2023     07/05/2020      Lab Results   Component Value Date    POTASSIUM 3.4 09/26/2023    POTASSIUM 3.8 03/12/2022    POTASSIUM 4.2 07/05/2020     Lab Results   Component Value Date    CHLORIDE 96 09/26/2023    CHLORIDE 101 03/12/2022    CHLORIDE 102 07/05/2020     Lab Results   Component Value Date    CO2 25 09/26/2023    CO2 25 03/12/2022    CO2 28 07/05/2020     Lab Results   Component Value Date    BUN 10.6 09/26/2023    BUN 11 03/12/2022    BUN 13 07/05/2020     Lab Results   Component Value Date    CR 0.80 09/26/2023    CR 0.95 07/05/2020       Lab Results   Component Value Date    WBC 7.5 09/26/2023    WBC 10.8 12/19/2006     Lab Results   Component Value Date    HGB 8.0 09/26/2023    HGB 16.9 12/19/2006     Lab Results   Component Value Date    HCT 24.5 09/26/2023    HCT 47.5 12/19/2006     Lab Results    Component Value Date     09/26/2023    MCV 85 12/19/2006     Lab Results   Component Value Date    PLT 95 09/26/2023     12/19/2006       Lab Results   Component Value Date    AST 93 09/13/2023    AST 52 12/19/2006     Lab Results   Component Value Date    ALT 83 09/13/2023    ALT 50 12/19/2006     Lab Results   Component Value Date    BILICONJ 0.0 12/19/2006      Lab Results   Component Value Date    BILITOTAL 9.3 09/13/2023    BILITOTAL 0.8 12/19/2006       Lab Results   Component Value Date    ALBUMIN 2.7 09/13/2023    ALBUMIN 4.0 09/03/2022    ALBUMIN 5.2 12/19/2006     Lab Results   Component Value Date    PROTTOTAL 7.8 09/13/2023    PROTTOTAL 9.7 12/19/2006      Lab Results   Component Value Date    ALKPHOS 362 09/13/2023    ALKPHOS 117 12/19/2006       Lab Results   Component Value Date    INR 2.67 09/13/2023     EXAM: DOPPLER SONOGRAM OF LIVER     DATE: 9/5/2023 6:40 PM     CLINICAL: E87.1 Hypo-osmolality and hyponatremia. Elevated liver function tests.     COMPARISON: Renal sonogram of 7/27/2023; CT scan of abdomen and pelvis done 6/26/2023.     TECHNIQUE: Real-time grayscale, color Doppler, and spectral Doppler waveform analysis used to evaluate major upper abdominal vascular structures with particular attention to the liver and spleen.     FINDINGS: The liver has nodular contours in keeping with known hepatic cirrhosis. The portal vein is patent but with hepatofugal flow; flow in the right branch of the portal vein is also retrograde, though antegrade in the left branch of the portal vein. The splenic vein has retrograde flow.     Hepatic artery patent. The left, middle, and right hepatic veins are patent.     Inferior vena cava and aorta are patent.     No ascites seen.     Virtual Visit Details    Type of service:  Video Visit   2:47 pm - 3:15 pm  Originating Location (pt. Location): Home    Distant Location (provider location):  Off-site  Platform used for Video Visit:  Yenifer      Again, thank you for allowing me to participate in the care of your patient.        Sincerely,        Sher Dutton PA-C

## 2023-10-03 ENCOUNTER — TELEPHONE (OUTPATIENT)
Dept: GASTROENTEROLOGY | Facility: CLINIC | Age: 53
End: 2023-10-03

## 2023-10-03 ENCOUNTER — VIRTUAL VISIT (OUTPATIENT)
Dept: FAMILY MEDICINE | Facility: CLINIC | Age: 53
End: 2023-10-03
Payer: COMMERCIAL

## 2023-10-03 ENCOUNTER — LAB (OUTPATIENT)
Dept: LAB | Facility: CLINIC | Age: 53
End: 2023-10-03
Payer: COMMERCIAL

## 2023-10-03 DIAGNOSIS — R18.8 OTHER ASCITES: ICD-10-CM

## 2023-10-03 DIAGNOSIS — E87.1 HYPONATREMIA: ICD-10-CM

## 2023-10-03 DIAGNOSIS — R60.0 BILATERAL LEG EDEMA: ICD-10-CM

## 2023-10-03 DIAGNOSIS — R73.01 IFG (IMPAIRED FASTING GLUCOSE): ICD-10-CM

## 2023-10-03 DIAGNOSIS — R07.89 CHEST WALL PAIN: ICD-10-CM

## 2023-10-03 DIAGNOSIS — K70.11 ALCOHOLIC HEPATITIS WITH ASCITES (H): ICD-10-CM

## 2023-10-03 DIAGNOSIS — K31.89 PORTAL HYPERTENSIVE GASTROPATHY (H): ICD-10-CM

## 2023-10-03 DIAGNOSIS — D64.9 ANEMIA DUE TO UNKNOWN MECHANISM: ICD-10-CM

## 2023-10-03 DIAGNOSIS — K70.30 ALCOHOLIC CIRRHOSIS OF LIVER WITHOUT ASCITES (H): Primary | ICD-10-CM

## 2023-10-03 DIAGNOSIS — K76.6 PORTAL HYPERTENSIVE GASTROPATHY (H): ICD-10-CM

## 2023-10-03 DIAGNOSIS — K74.60 DECOMPENSATED HEPATIC CIRRHOSIS (H): Primary | ICD-10-CM

## 2023-10-03 DIAGNOSIS — K72.90 DECOMPENSATED HEPATIC CIRRHOSIS (H): Primary | ICD-10-CM

## 2023-10-03 DIAGNOSIS — I85.10 SECONDARY ESOPHAGEAL VARICES WITHOUT BLEEDING (H): ICD-10-CM

## 2023-10-03 PROBLEM — K70.10 ALCOHOLIC HEPATITIS WITHOUT ASCITES (H): Status: RESOLVED | Noted: 2023-07-13 | Resolved: 2023-10-03

## 2023-10-03 PROBLEM — S22.32XA CLOSED FRACTURE OF ONE RIB OF LEFT SIDE: Status: RESOLVED | Noted: 2023-09-14 | Resolved: 2023-10-03

## 2023-10-03 LAB
ANION GAP SERPL CALCULATED.3IONS-SCNC: 12 MMOL/L (ref 7–15)
BASO+EOS+MONOS # BLD AUTO: ABNORMAL 10*3/UL
BASO+EOS+MONOS NFR BLD AUTO: ABNORMAL %
BASOPHILS # BLD AUTO: 0.1 10E3/UL (ref 0–0.2)
BASOPHILS NFR BLD AUTO: 1 %
BUN SERPL-MCNC: 11.4 MG/DL (ref 6–20)
CALCIUM SERPL-MCNC: 8 MG/DL (ref 8.6–10)
CHLORIDE SERPL-SCNC: 95 MMOL/L (ref 98–107)
CREAT SERPL-MCNC: 0.9 MG/DL (ref 0.67–1.17)
DEPRECATED HCO3 PLAS-SCNC: 27 MMOL/L (ref 22–29)
EGFRCR SERPLBLD CKD-EPI 2021: >90 ML/MIN/1.73M2
EOSINOPHIL # BLD AUTO: 0.2 10E3/UL (ref 0–0.7)
EOSINOPHIL NFR BLD AUTO: 2 %
ERYTHROCYTE [DISTWIDTH] IN BLOOD BY AUTOMATED COUNT: 17.5 % (ref 10–15)
GLUCOSE SERPL-MCNC: 242 MG/DL (ref 70–99)
HCT VFR BLD AUTO: 24.1 % (ref 40–53)
HGB BLD-MCNC: 8.3 G/DL (ref 13.3–17.7)
IMM GRANULOCYTES # BLD: 0.2 10E3/UL
IMM GRANULOCYTES NFR BLD: 2 %
LYMPHOCYTES # BLD AUTO: 1.4 10E3/UL (ref 0.8–5.3)
LYMPHOCYTES NFR BLD AUTO: 14 %
MCH RBC QN AUTO: 36.2 PG (ref 26.5–33)
MCHC RBC AUTO-ENTMCNC: 34.4 G/DL (ref 31.5–36.5)
MCV RBC AUTO: 105 FL (ref 78–100)
MONOCYTES # BLD AUTO: 0.9 10E3/UL (ref 0–1.3)
MONOCYTES NFR BLD AUTO: 9 %
NEUTROPHILS # BLD AUTO: 7.3 10E3/UL (ref 1.6–8.3)
NEUTROPHILS NFR BLD AUTO: 72 %
NRBC # BLD AUTO: 0 10E3/UL
NRBC BLD AUTO-RTO: 0 /100
PLATELET # BLD AUTO: 116 10E3/UL (ref 150–450)
POTASSIUM SERPL-SCNC: 3.1 MMOL/L (ref 3.4–5.3)
RBC # BLD AUTO: 2.29 10E6/UL (ref 4.4–5.9)
SODIUM SERPL-SCNC: 134 MMOL/L (ref 135–145)
WBC # BLD AUTO: 10 10E3/UL (ref 4–11)

## 2023-10-03 PROCEDURE — 85025 COMPLETE CBC W/AUTO DIFF WBC: CPT

## 2023-10-03 PROCEDURE — 36415 COLL VENOUS BLD VENIPUNCTURE: CPT

## 2023-10-03 PROCEDURE — 80048 BASIC METABOLIC PNL TOTAL CA: CPT

## 2023-10-03 PROCEDURE — 99214 OFFICE O/P EST MOD 30 MIN: CPT | Mod: VID | Performed by: INTERNAL MEDICINE

## 2023-10-03 RX ORDER — SODIUM CHLORIDE 1 G/1
2 TABLET ORAL 3 TIMES DAILY
COMMUNITY
Start: 2023-10-03 | End: 2023-11-22

## 2023-10-03 NOTE — TELEPHONE ENCOUNTER
Called patient per Sher Duttno PA-C regarding lab results. Spoke with patient's wife with patient in the room.    Informed patient:  Sher reviewed your recent admission and labs with one of the hepatologists. They also agreed you should not be on the salt tablets. This is not how hyponatremia is managed in the setting of cirrhosis and it will only exacerbate fluid overload. This should help swelling significantly.     BMP added to patient's labs on 10/11.    Patient's wife expressed concerns with patient's low sodium level if patient stops the salt tablet but was agreeable to discontinuing the salt tablets.    JESSIE Cedeno, RN, PHN  Hepatology Clinic  [facility]  [facility]

## 2023-10-03 NOTE — PROGRESS NOTES
Mary is a 52 year old who is being evaluated via a billable video visit.      How would you like to obtain your AVS? MyChart  If the video visit is dropped, the invitation should be resent by: Text to cell phone: [phone]  Will anyone else be joining your video visit? No          Assessment & Plan     1.  Decompensated hepatic cirrhosis with mild ascites based on recent CT scan of the abdomen.  Currently on torsemide 10 mg twice a day and lactulose.  No evidence of hepatic encephalopathy.  Clinically stable.  Recent LFTs do show improvement in transaminases total bilirubin remains elevated.  3.  Bilateral leg edema.  Has improved since torsemide was increased back to 10 mg twice a day and weight is down by 5 pounds.  At home weight 237 pounds.  4.  Hyponatremia.  Last sodium was improved at 129 on 9/26/2023.  We will have lab again today and I will get back to patient with recommendation.  He is still taking sodium chloride 2 g 3 times a day.  Liver specialist want sodium to be discontinued.  Patient reluctant to do so because of sodium at ONE time having dropped quite low.  We will discuss about weaning off slowly.  5.  Impaired fasting glucose.  Last A1c in August was 5.1.  His nonfasting blood sugars have been in the 300 range.  A1c may not be very accurate in face of anemia.  We will see what the blood sugar is today and then decide if he should start metformin.  6.  Anemia multifactorial but not related to bleeding. last hemoglobin 8.0 on 9/26/2023.  Has a recheck today and will see.  Currently on iron therapy.  7.  Left-sided chest wall pain has improved.  The CT scan did not identify any pathology including rib fracture.  8.  Probable hypertensive gastropathy at identified endoscopically on 8/20/2023.  No evidence of bleeding.  9.  Secondary esophageal 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 based on last endoscopy 8/22/2023.      Jimmie Hoyt MD  [facility]    Elmo Hernandez is a 52 year  old, presenting for the following health issues:  No chief complaint on file.      History of Present Illness       Reason for visit:  Blood sugar    He eats 2-3 servings of fruits and vegetables daily.He consumes 2 sweetened beverage(s) daily.He exercises with enough effort to increase his heart rate 9 or less minutes per day.  He exercises with enough effort to increase his heart rate 3 or less days per week.   He is taking medications regularly.       Diabetes Follow-up    How often are you checking your blood sugar? Not at all  What concerns do you have today about your diabetes? None   Do you have any of these symptoms? (Select all that apply)  No numbness or tingling in feet.  No redness, sores or blisters on feet.  No complaints of excessive thirst.  No reports of blurry vision.  No significant changes to weight.      BP Readings from Last 2 Encounters:   09/14/23 93/57   08/21/23 112/73     Hemoglobin A1C (%)   Date Value   08/23/2023 5.1   03/12/2022 5.7 (H)     LDL Cholesterol Calculated (mg/dL)   Date Value   03/12/2022 102 (H)   12/04/2021 120 (H)   07/05/2020 87           52-year-old gentleman with known history of decompensated hepatic cirrhosis related to alcohol, anemia, hyponatremia and elevated blood sugar seen by video visit to discuss ongoing issues particularly related to hyponatremia and blood sugar.  Patient indicated that he is clinically doing okay except that his legs do still feel heavy and has noticed some fluid though better since increasing torsemide back to 10 mg twice a day.  Had cut back slightly because of hyponatremia.  Also when he lays down he experiences cough which does clear once he is up and around.  Denies reflux.  Patient already on PPI.  The left-sided chest pain seems to be improving.  He is now able to lay on that side.  Still has it.  Denies dyspnea.    Review of Systems   Constitutional, HEENT, cardiovascular, pulmonary, GI, , musculoskeletal, neuro, skin, endocrine  and psych systems are negative, except as otherwise noted.      Objective           Vitals:  No vitals were obtained today due to virtual visit.    Physical Exam   GENERAL: Healthy, alert and no distress  EYES: Eyes grossly normal to inspection except sclera is icteric  RESP: No audible wheeze, cough, or visible cyanosis.  No visible retractions or increased work of breathing.    SKIN: Visible skin clear. No significant rash, abnormal pigmentation or lesions.  NEURO: Cranial nerves grossly intact.  Mentation and speech appropriate for age.  PSYCH: Mentation appears normal, affect normal/bright, judgement and insight intact, normal speech and appearance well-groomed.     Latest Reference Range & Units 09/26/23 17:01   Sodium 135 - 145 mmol/L 129 (L)   Potassium 3.4 - 5.3 mmol/L 3.4   Chloride 98 - 107 mmol/L 96 (L)   Carbon Dioxide (CO2) 22 - 29 mmol/L 25   Urea Nitrogen 6.0 - 20.0 mg/dL 10.6   Creatinine 0.67 - 1.17 mg/dL 0.80   GFR Estimate >60 mL/min/1.73m2 >90   Calcium 8.6 - 10.0 mg/dL 7.9 (L)   Anion Gap 7 - 15 mmol/L 8   Albumin 3.5 - 5.2 g/dL 2.2 (L)   Protein Total 6.4 - 8.3 g/dL 6.2 (L)   Alkaline Phosphatase 40 - 129 U/L 216 (H)   ALT 0 - 70 U/L 39   AST 0 - 45 U/L 73 (H)   Bilirubin Direct 0.00 - 0.30 mg/dL 4.67 (H)   Bilirubin Total <=1.2 mg/dL 9.3 (H)   Glucose 70 - 99 mg/dL 285 (H)   (L): Data is abnormally low  (H): Data is abnormally high       Latest Reference Range & Units 09/26/23 17:01   WBC 4.0 - 11.0 10e3/uL 7.5   Hemoglobin 13.3 - 17.7 g/dL 8.0 (L)   Hematocrit 40.0 - 53.0 % 24.5 (L)   Platelet Count 150 - 450 10e3/uL 95 (L)   RBC Count 4.40 - 5.90 10e6/uL 2.28 (L)   MCV 78 - 100 fL 108 (H)   MCH 26.5 - 33.0 pg 35.1 (H)   MCHC 31.5 - 36.5 g/dL 32.7   RDW 10.0 - 15.0 % 17.4 (H)   % Neutrophils % 72   % Lymphocytes % 12   % Monocytes % 10   % Eosinophils % 2   % Basophils % 1   (L): Data is abnormally low  (H): Data is abnormally high        Video-Visit Details    Type of service:  Video Visit      Originating Location (pt. Location): Home    Distant Location (provider location):  On-site  Platform used for Video Visit: Yenifer

## 2023-10-04 DIAGNOSIS — E87.6 HYPOKALEMIA: Primary | ICD-10-CM

## 2023-10-04 RX ORDER — POTASSIUM CHLORIDE 750 MG/1
10 TABLET, EXTENDED RELEASE ORAL 2 TIMES DAILY
Qty: 180 TABLET | Refills: 1 | Status: SHIPPED | OUTPATIENT
Start: 2023-10-04 | End: 2024-01-18

## 2023-10-05 ENCOUNTER — MYC MEDICAL ADVICE (OUTPATIENT)
Dept: FAMILY MEDICINE | Facility: CLINIC | Age: 53
End: 2023-10-05
Payer: COMMERCIAL

## 2023-10-09 ENCOUNTER — TELEPHONE (OUTPATIENT)
Dept: GASTROENTEROLOGY | Facility: CLINIC | Age: 53
End: 2023-10-09
Payer: COMMERCIAL

## 2023-10-10 ENCOUNTER — LAB (OUTPATIENT)
Dept: LAB | Facility: CLINIC | Age: 53
End: 2023-10-10
Payer: COMMERCIAL

## 2023-10-10 DIAGNOSIS — R18.8 OTHER ASCITES: ICD-10-CM

## 2023-10-10 DIAGNOSIS — D64.9 ANEMIA, UNSPECIFIED TYPE: ICD-10-CM

## 2023-10-10 DIAGNOSIS — R60.0 BILATERAL LEG EDEMA: ICD-10-CM

## 2023-10-10 DIAGNOSIS — D64.9 ANEMIA DUE TO UNKNOWN MECHANISM: Primary | ICD-10-CM

## 2023-10-10 DIAGNOSIS — D64.9 ANEMIA DUE TO UNKNOWN MECHANISM: ICD-10-CM

## 2023-10-10 DIAGNOSIS — K70.30 ALCOHOLIC CIRRHOSIS OF LIVER WITHOUT ASCITES (H): ICD-10-CM

## 2023-10-10 LAB
ANION GAP SERPL CALCULATED.3IONS-SCNC: 11 MMOL/L (ref 7–15)
BASO+EOS+MONOS # BLD AUTO: ABNORMAL 10*3/UL
BASO+EOS+MONOS NFR BLD AUTO: ABNORMAL %
BASOPHILS # BLD AUTO: 0.1 10E3/UL (ref 0–0.2)
BASOPHILS NFR BLD AUTO: 1 %
BUN SERPL-MCNC: 7.5 MG/DL (ref 6–20)
CALCIUM SERPL-MCNC: 7.9 MG/DL (ref 8.6–10)
CHLORIDE SERPL-SCNC: 95 MMOL/L (ref 98–107)
CREAT SERPL-MCNC: 0.9 MG/DL (ref 0.67–1.17)
DEPRECATED HCO3 PLAS-SCNC: 29 MMOL/L (ref 22–29)
EGFRCR SERPLBLD CKD-EPI 2021: >90 ML/MIN/1.73M2
EOSINOPHIL # BLD AUTO: 0.3 10E3/UL (ref 0–0.7)
EOSINOPHIL NFR BLD AUTO: 3 %
ERYTHROCYTE [DISTWIDTH] IN BLOOD BY AUTOMATED COUNT: 16 % (ref 10–15)
GLUCOSE SERPL-MCNC: 186 MG/DL (ref 70–99)
HCT VFR BLD AUTO: 25.9 % (ref 40–53)
HGB BLD-MCNC: 8.5 G/DL (ref 13.3–17.7)
HOLD SPECIMEN: NORMAL
IMM GRANULOCYTES # BLD: 0.1 10E3/UL
IMM GRANULOCYTES NFR BLD: 1 %
LYMPHOCYTES # BLD AUTO: 1.5 10E3/UL (ref 0.8–5.3)
LYMPHOCYTES NFR BLD AUTO: 16 %
MCH RBC QN AUTO: 35.1 PG (ref 26.5–33)
MCHC RBC AUTO-ENTMCNC: 32.8 G/DL (ref 31.5–36.5)
MCV RBC AUTO: 107 FL (ref 78–100)
MONOCYTES # BLD AUTO: 1 10E3/UL (ref 0–1.3)
MONOCYTES NFR BLD AUTO: 10 %
NEUTROPHILS # BLD AUTO: 6.9 10E3/UL (ref 1.6–8.3)
NEUTROPHILS NFR BLD AUTO: 69 %
NRBC # BLD AUTO: 0 10E3/UL
NRBC BLD AUTO-RTO: 0 /100
PLATELET # BLD AUTO: 135 10E3/UL (ref 150–450)
POTASSIUM SERPL-SCNC: 3.3 MMOL/L (ref 3.4–5.3)
RBC # BLD AUTO: 2.42 10E6/UL (ref 4.4–5.9)
SODIUM SERPL-SCNC: 135 MMOL/L (ref 135–145)
WBC # BLD AUTO: 9.8 10E3/UL (ref 4–11)

## 2023-10-10 PROCEDURE — 36415 COLL VENOUS BLD VENIPUNCTURE: CPT

## 2023-10-10 PROCEDURE — 85025 COMPLETE CBC W/AUTO DIFF WBC: CPT

## 2023-10-10 PROCEDURE — 80048 BASIC METABOLIC PNL TOTAL CA: CPT

## 2023-10-12 ENCOUNTER — MYC MEDICAL ADVICE (OUTPATIENT)
Dept: FAMILY MEDICINE | Facility: CLINIC | Age: 53
End: 2023-10-12
Payer: COMMERCIAL

## 2023-10-12 DIAGNOSIS — K72.90 DECOMPENSATED HEPATIC CIRRHOSIS (H): Primary | ICD-10-CM

## 2023-10-12 DIAGNOSIS — K74.60 DECOMPENSATED HEPATIC CIRRHOSIS (H): Primary | ICD-10-CM

## 2023-10-16 ENCOUNTER — VIRTUAL VISIT (OUTPATIENT)
Dept: SLEEP MEDICINE | Facility: CLINIC | Age: 53
End: 2023-10-16
Attending: INTERNAL MEDICINE
Payer: COMMERCIAL

## 2023-10-16 VITALS — HEIGHT: 70 IN | WEIGHT: 220 LBS | BODY MASS INDEX: 31.5 KG/M2

## 2023-10-16 DIAGNOSIS — F10.10 ALCOHOL ABUSE: ICD-10-CM

## 2023-10-16 DIAGNOSIS — R06.00 DYSPNEA AND RESPIRATORY ABNORMALITY: Primary | ICD-10-CM

## 2023-10-16 DIAGNOSIS — R53.83 MALAISE AND FATIGUE: ICD-10-CM

## 2023-10-16 DIAGNOSIS — E66.811 CLASS 1 OBESITY DUE TO EXCESS CALORIES WITH SERIOUS COMORBIDITY AND BODY MASS INDEX (BMI) OF 31.0 TO 31.9 IN ADULT: ICD-10-CM

## 2023-10-16 DIAGNOSIS — G47.30 SLEEP APNEA, UNSPECIFIED TYPE: ICD-10-CM

## 2023-10-16 DIAGNOSIS — R06.89 DYSPNEA AND RESPIRATORY ABNORMALITY: Primary | ICD-10-CM

## 2023-10-16 DIAGNOSIS — R53.81 MALAISE AND FATIGUE: ICD-10-CM

## 2023-10-16 DIAGNOSIS — I10 ESSENTIAL HYPERTENSION: ICD-10-CM

## 2023-10-16 DIAGNOSIS — E66.09 CLASS 1 OBESITY DUE TO EXCESS CALORIES WITH SERIOUS COMORBIDITY AND BODY MASS INDEX (BMI) OF 31.0 TO 31.9 IN ADULT: ICD-10-CM

## 2023-10-16 PROBLEM — E87.1 HYPONATREMIA: Chronic | Status: ACTIVE | Noted: 2023-09-15

## 2023-10-16 PROBLEM — K74.60 DECOMPENSATED HEPATIC CIRRHOSIS (H): Status: RESOLVED | Noted: 2023-09-15 | Resolved: 2023-10-16

## 2023-10-16 PROBLEM — E78.5 MILD HYPERLIPIDEMIA: Chronic | Status: ACTIVE | Noted: 2021-12-07

## 2023-10-16 PROBLEM — I85.10 SECONDARY ESOPHAGEAL VARICES WITHOUT BLEEDING (H): Chronic | Status: ACTIVE | Noted: 2023-07-13

## 2023-10-16 PROBLEM — Z72.0 TOBACCO ABUSE DISORDER: Chronic | Status: ACTIVE | Noted: 2020-03-11

## 2023-10-16 PROBLEM — N17.9 AKI (ACUTE KIDNEY INJURY) (H): Status: RESOLVED | Noted: 2023-06-27 | Resolved: 2023-10-16

## 2023-10-16 PROBLEM — A41.9 SEVERE SEPSIS WITHOUT SEPTIC SHOCK (H): Status: ACTIVE | Noted: 2023-06-26

## 2023-10-16 PROBLEM — D69.6 THROMBOCYTOPENIA (H): Chronic | Status: ACTIVE | Noted: 2023-07-13

## 2023-10-16 PROBLEM — K31.89 PORTAL HYPERTENSIVE GASTROPATHY (H): Chronic | Status: ACTIVE | Noted: 2023-07-13

## 2023-10-16 PROBLEM — I86.4 GASTRIC VARICES: Status: ACTIVE | Noted: 2023-06-27

## 2023-10-16 PROBLEM — R04.0 EPISTAXIS: Status: RESOLVED | Noted: 2023-08-21 | Resolved: 2023-10-16

## 2023-10-16 PROBLEM — R65.20 SEVERE SEPSIS WITHOUT SEPTIC SHOCK (H): Status: ACTIVE | Noted: 2023-06-26

## 2023-10-16 PROBLEM — K70.11 ALCOHOLIC HEPATITIS WITH ASCITES (H): Chronic | Status: ACTIVE | Noted: 2023-10-03

## 2023-10-16 PROBLEM — E66.01 MORBID OBESITY (H): Chronic | Status: ACTIVE | Noted: 2022-09-16

## 2023-10-16 PROBLEM — R65.20 SEVERE SEPSIS WITHOUT SEPTIC SHOCK (H): Status: RESOLVED | Noted: 2023-06-26 | Resolved: 2023-10-16

## 2023-10-16 PROBLEM — N17.9 AKI (ACUTE KIDNEY INJURY) (H): Status: ACTIVE | Noted: 2023-06-27

## 2023-10-16 PROBLEM — K92.2 GI BLEED: Status: RESOLVED | Noted: 2023-07-26 | Resolved: 2023-10-16

## 2023-10-16 PROBLEM — K92.2 GI BLEED: Status: ACTIVE | Noted: 2023-07-26

## 2023-10-16 PROBLEM — A41.9 SEVERE SEPSIS WITHOUT SEPTIC SHOCK (H): Status: RESOLVED | Noted: 2023-06-26 | Resolved: 2023-10-16

## 2023-10-16 PROBLEM — N49.2 SCROTAL ABSCESS: Status: ACTIVE | Noted: 2023-06-27

## 2023-10-16 PROBLEM — K76.6 PORTAL HYPERTENSIVE GASTROPATHY (H): Chronic | Status: ACTIVE | Noted: 2023-07-13

## 2023-10-16 PROBLEM — I86.4 GASTRIC VARICES: Chronic | Status: ACTIVE | Noted: 2023-06-27

## 2023-10-16 PROBLEM — N49.2 SCROTAL ABSCESS: Status: RESOLVED | Noted: 2023-06-27 | Resolved: 2023-10-16

## 2023-10-16 PROBLEM — K72.90 DECOMPENSATED HEPATIC CIRRHOSIS (H): Status: RESOLVED | Noted: 2023-09-15 | Resolved: 2023-10-16

## 2023-10-16 PROCEDURE — 99244 OFF/OP CNSLTJ NEW/EST MOD 40: CPT | Mod: VID | Performed by: PHYSICIAN ASSISTANT

## 2023-10-16 ASSESSMENT — SLEEP AND FATIGUE QUESTIONNAIRES
HOW LIKELY ARE YOU TO NOD OFF OR FALL ASLEEP WHILE LYING DOWN TO REST IN THE AFTERNOON WHEN CIRCUMSTANCES PERMIT: HIGH CHANCE OF DOZING
HOW LIKELY ARE YOU TO NOD OFF OR FALL ASLEEP WHILE SITTING QUIETLY AFTER LUNCH WITHOUT ALCOHOL: WOULD NEVER DOZE
HOW LIKELY ARE YOU TO NOD OFF OR FALL ASLEEP WHILE SITTING AND READING: MODERATE CHANCE OF DOZING
HOW LIKELY ARE YOU TO NOD OFF OR FALL ASLEEP WHEN YOU ARE A PASSENGER IN A CAR FOR AN HOUR WITHOUT A BREAK: WOULD NEVER DOZE
HOW LIKELY ARE YOU TO NOD OFF OR FALL ASLEEP WHILE SITTING AND TALKING TO SOMEONE: WOULD NEVER DOZE
HOW LIKELY ARE YOU TO NOD OFF OR FALL ASLEEP WHILE WATCHING TV: MODERATE CHANCE OF DOZING
HOW LIKELY ARE YOU TO NOD OFF OR FALL ASLEEP IN A CAR, WHILE STOPPED FOR A FEW MINUTES IN TRAFFIC: WOULD NEVER DOZE
HOW LIKELY ARE YOU TO NOD OFF OR FALL ASLEEP WHILE SITTING INACTIVE IN A PUBLIC PLACE: WOULD NEVER DOZE

## 2023-10-16 ASSESSMENT — PAIN SCALES - GENERAL: PAINLEVEL: NO PAIN (0)

## 2023-10-16 NOTE — NURSING NOTE
Is the patient currently in the state of [place]? YES    Visit mode:VIDEO    If the visit is dropped, the patient can be reconnected by: VIDEO VISIT: Send to e-mail at: [email or web address]    Will anyone else be joining the visit? NO  (If patient encounters technical issues they should call [phone]    How would you like to obtain your AVS? MyChart    Are changes needed to the allergy or medication list? No    Reason for visit: Consult  Has patient had flu shot for current/most recent flu season? If so, when? No    Vicki CHRISTY

## 2023-10-16 NOTE — PATIENT INSTRUCTIONS
"           MY TREATMENT INFORMATION FOR SLEEP APNEA-  Mary Lopez    DOCTOR : JAY Greene    Am I having a sleep study at a sleep center?  --->Due to normal delays, you will be contacted within 2-4 weeks to schedule    Am I having a home sleep study?  --->Watch the video for the device you are using:    -/drop off device-   https://www.[employer].com/watch?v=yGGFBdELGhk    -Disposable device sent out require phone/computer application-   https://www.DUHEM.com/watch?v=BCce_vbiwxE      Frequently asked questions:  1. What is Obstructive Sleep Apnea (JAYLIN)? JAYLIN is the most common type of sleep apnea. Apnea means, \"without breath.\"  Apnea is most often caused by narrowing or collapse of the upper airway as muscles relax during sleep.   Almost everyone has occasional apneas. Most people with sleep apnea have had brief interruptions at night frequently for many years.  The severity of sleep apnea is related to how frequent and severe the events are.   2. What are the consequences of JAYLIN? Symptoms include: feeling sleepy during the day, snoring loudly, gasping or stopping of breathing, trouble sleeping, and occasionally morning headaches or heartburn at night.  Sleepiness can be serious and even increase the risk of falling asleep while driving. Other health consequences may include development of high blood pressure and other cardiovascular disease in persons who are susceptible. Untreated JAYLIN  can contribute to heart disease, stroke and diabetes.   3. What are the treatment options? In most situations, sleep apnea is a lifelong disease that must be managed with daily therapy. Medications are not effective for sleep apnea and surgery is generally not considered until other therapies have been tried. Your treatment is your choice . Continuous Positive Airway (CPAP) works right away and is the therapy that is effective in nearly everyone. An oral device to hold your jaw forward is usually the next most " reliable option. Other options include postioning devices (to keep you off your back), weight loss, and surgery including a tongue pacing device. There is more detail about some of these options below.  4. Are my sleep studies covered by insurance? Although we will request verification of coverage, we advise you also check in advance of the study to ensure there is coverage.    Important tips for those choosing CPAP and similar devices  For new devices, sign up for device EMY to monitor your device for your followup visits  We encourage you to utilize the [employer] emy or website (myAir web (resmed.com) ) to monitor your therapy progress and share the data with your healthcare team when you discuss your sleep apnea.                                                    Know your equipment:  CPAP is continuous positive airway pressure that prevents obstructive sleep apnea by keeping the throat from collapsing while you are sleeping. In most cases, the device is  smart  and can slowly self-adjusts if your throat collapses and keeps a record every day of how well you are treated-this information is available to you and your care team.  BPAP is bilevel positive airway pressure that keeps your throat open and also assists each breath with a pressure boost to maintain adequate breathing.  Special kinds of BPAP are used in patients who have inadequate breathing from lung or heart disease. In most cases, the device is  smart  and can slowly self-adjusts to assist breathing. Like CPAP, the device keeps a record of how well you are treated.  Your mask is your connection to the device. You get to choose what feels most comfortable and the staff will help to make sure if fits. Here: are some examples of the different masks that are available:       Key points to remember on your journey with sleep apnea:  Sleep study.  PAP devices often need to be adjusted during a sleep study to show that they are effective and adjusted  right.  Good tips to remember: Try wearing just the mask during a quiet time during the day so your body adapts to wearing it. A humidifier is recommended for comfort in most cases to prevent drying of your nose and throat. Allergy medication from your provider may help you if you are having nasal congestion.  Getting settled-in. It takes more than one night for most of us to get used to wearing a mask. Try wearing just the mask during a quiet time during the day so your body adapts to wearing it. A humidifier is recommended for comfort in most cases. Our team will work with you carefully on the first day and will be in contact within 4 days and again at 2 and 4 weeks for advice and remote device adjustments. Your therapy is evaluated by the device each day.   Use it every night. The more you are able to sleep naturally for 7-8 hours, the more likely you will have good sleep and to prevent health risks or symptoms from sleep apnea. Even if you use it 4 hours it helps. Occasionally all of us are unable to use a medical therapy, in sleep apnea, it is not dangerous to miss one night.   Communicate. Call our skilled team on the number provided on the first day if your visit for problems that make it difficult to wear the device. Over 2 out of 3 patients can learn to wear the device long-term with help from our team. Remember to call our team or your sleep providers if you are unable to wear the device as we may have other solutions for those who cannot adapt to mask CPAP therapy. It is recommended that you sleep your sleep provider within the first 3 months and yearly after that if you are not having problems.   Use it for your health. We encourage use of CPAP masks during daytime quiet periods to allow your face and brain to adapt to the sensation of CPAP so that it will be a more natural sensation to awaken to at night or during naps. This can be very useful during the first few weeks or months of adapting to CPAP  though it does not help medically to wear CPAP during wakefulness and  should not be used as a strategy just to meet guidelines.  Take care of your equipment. Make sure you clean your mask and tubing using directions every day and that your filter and mask are replaced as recommended or if they are not working.     BESIDES CPAP, WHAT OTHER THERAPIES ARE THERE?    Positioning Device  Positioning devices are generally used when sleep apnea is mild and only occurs on your back.This example shows a pillow that straps around the waist. It may be appropriate for those whose sleep study shows milder sleep apnea that occurs primarily when lying flat on one's back. Preliminary studies have shown benefit but effectiveness at home may need to be verified by a home sleep test. These devices are generally not covered by medical insurance.  Examples of devices that maintain sleeping on the back to prevent snoring and mild sleep apnea.    Belt type body positioner  http://[employer]/    Electronic reminder  http://nightshifttherapy.TreatFeed/            Oral Appliance  What is oral appliance therapy?  An oral appliance device fits on your teeth at night like a retainer used after having braces. The device is made by a specialized dentist and requires several visits over 1-2 months before a manufactured device is made to fit your teeth and is adjusted to prevent your sleep apnea. Once an oral device is working properly, snoring should be improved. A home sleep test may be recommended at that time if to determine whether the sleep apnea is adequately treated.       Some things to remember:  -Oral devices are often, but not always, covered by your medical insurance. Be sure to check with your insurance provider.   -If you are referred for oral therapy, you will be given a list of specialized dentists to consider or you may choose to visit the Web site of the American Academy of Dental Sleep Medicine  -Oral devices are less likely to work  if you have severe sleep apnea or are extremely overweight.     More detailed information  An oral appliance is a small acrylic device that fits over the upper and lower teeth  (similar to a retainer or a mouth guard). This device slightly moves jaw forward, which moves the base of the tongue forward, opens the airway, improves breathing for effective treat snoring and obstructive sleep apnea in perhaps 7 out of 10 people .  The best working devices are custom-made by a dental device  after a mold is made of the teeth 1, 2, 3.  When is an oral appliance indicated?  Oral appliance therapy is recommended as a first-line treatment for patients with primary snoring, mild sleep apnea, and for patients with moderate sleep apnea who prefer appliance therapy to use of CPAP4, 5. Severity of sleep apnea is determined by sleep testing and is based on the number of respiratory events per hour of sleep.   How successful is oral appliance therapy?  The success rate of oral appliance therapy in patients with mild sleep apnea is 75-80% while in patients with moderate sleep apnea it is 50-70%. The chance of success in patients with severe sleep apnea is 40-50%. The research also shows that oral appliances have a beneficial effect on the cardiovascular health of JAYLIN patients at the same magnitude as CPAP therapy7.  Oral appliances should be a second-line treatment in cases of severe sleep apnea, but if not completely successful then a combination therapy utilizing CPAP plus oral appliance therapy may be effective. Oral appliances tend to be effective in a broad range of patients although studies show that the patients who have the highest success are females, younger patients, those with milder disease, and less severe obesity. 3, 6.   Finding a dentist that practices dental sleep medicine  Specific training is available through the American Academy of Dental Sleep Medicine for dentists interested in working in the field  of sleep. To find a dentist who is educated in the field of sleep and the use of oral appliances, near you, visit the Web site of the American Academy of Dental Sleep Medicine.    References  1. Bk et al. Objectively measured vs self-reported compliance during oral appliance therapy for sleep-disordered breathing. Chest 2013; 144(5): 2486-3615.  2. Chrissy, et al. Objective measurement of compliance during oral appliance therapy for sleep-disordered breathing. Thorax 2013; 68(1): 91-96.  3. Adarsh et al. Mandibular advancement devices in 620 men and women with JAYLIN and snoring: tolerability and predictors of treatment success. Chest 2004; 125: 4298-4646.  4. Jessie et al. Oral appliances for snoring and JAYLIN: a review. Sleep 2006; 29: 244-262.  5. Ivy et al. Oral appliance treatment for JAYLIN: an update. J Clin Sleep Med 2014; 10(2): 215-227.  6. Aly et al. Predictors of OSAH treatment outcome. J Dent Res 2007; 86: 0913-2960.      Weight Loss:    Weight loss is a long-term strategy that may improve sleep apnea in some patients.    Weight management is a personal decision and the decision should be based on your interest and the potential benefits.  If you are interested in exploring weight loss strategies, the following discussion covers the impact on weight loss on sleep apnea and the approaches that may be successful.    Being overweight does not necessarily mean you will have health consequences.  Those who have BMI over 35 or over 27 with existing medical conditions carries greater risk.   Weight loss decreases severity of sleep apnea in most people with obesity. For those with mild obesity who have developed snoring with weight gain, even 15-30 pound weight loss can improve and occasionally eliminate sleep apnea.  Structured and life-long dietary and health habits are necessary to lose weight and keep healthier weight levels.     Though there may be significant health benefits from  weight loss, long-term weight loss is very difficult to achieve- studies show success with dietary management in less than 10% of people. In addition, substantial weight loss may require years of dietary control and may be difficult if patients have severe obesity. In these cases, surgical management may be considered.  Finally, older individuals who have tolerated obesity without health complications may be less likely to benefit from weight loss strategies.      [unfilled]    Surgery:    Surgery for obstructive sleep apnea is considered generally only when other therapies fail to work. Surgery may be discussed with you if you are having a difficult time tolerating CPAP and or when there is an abnormal structure that requires surgical correction.  Nose and throat surgeries often enlarge the airway to prevent collapse.  Most of these surgeries create pain for 1-2 weeks and up to half of the most common surgeries are not effective throughout life.  You should carefully discuss the benefits and drawbacks to surgery with your sleep provider and surgeon to determine if it is the best solution for you.   More information  Surgery for JAYLIN is directed at areas that are responsible for narrowing or complete obstruction of the airway during sleep.  There are a wide range of procedures available to enlarge and/or stabilize the airway to prevent blockage of breathing in the three major areas where it can occur: the palate, tongue, and nasal regions.  Successful surgical treatment depends on the accurate identification of the factors responsible for obstructive sleep apnea in each person.  A personalized approach is required because there is no single treatment that works well for everyone.  Because of anatomic variation, consultation with an examination by a sleep surgeon is a critical first step in determining what surgical options are best for each patient.  In some cases, examination during sedation may be recommended in  order to guide the selection of procedures.  Patients will be counseled about risks and benefits as well as the typical recovery course after surgery. Surgery is typically not a cure for a person s JAYLIN.  However, surgery will often significantly improve one s JAYLIN severity (termed  success rate ).  Even in the absence of a cure, surgery will decrease the cardiovascular risk associated with OSA7; improve overall quality of life8 (sleepiness, functionality, sleep quality, etc).      Palate Procedures:  Patients with JAYLIN often have narrowing of their airway in the region of their tonsils and uvula.  The goals of palate procedures are to widen the airway in this region as well as to help the tissues resist collapse.  Modern palate procedure techniques focus on tissue conservation and soft tissue rearrangement, rather than tissue removal.  Often the uvula is preserved in this procedure. Residual sleep apnea is common in patient after pharyngoplasty with an average reduction in sleep apnea events of 33%2.      Tongue Procedures:  ExamWhile patients are awake, the muscles that surround the throat are active and keep this region open for breathing. These muscles relax during sleep, allowing the tongue and other structures to collapse and block breathing.  There are several different tongue procedures available.  Selection of a tongue base procedure depends on characteristics seen on physical exam.  Generally, procedures are aimed at removing bulky tissues in this area or preventing the back of the tongue from falling back during sleep.  Success rates for tongue surgery range from 50-62%3.    Hypoglossal Nerve Stimulation:  Hypoglossal nerve stimulation has recently received approval from the [place] Food and Drug Administration for the treatment of obstructive sleep apnea.  This is based on research showing that the system was safe and effective in treating sleep apnea6.  Results showed that the median AHI score  decreased 68%, from 29.3 to 9.0. This therapy uses an implant system that senses breathing patterns and delivers mild stimulation to airway muscles, which keeps the airway open during sleep.  The system consists of three fully implanted components: a small generator (similar in size to a pacemaker), a breathing sensor, and a stimulation lead.  Using a small handheld remote, a patient turns the therapy on before bed and off upon awakening.    Candidates for this device must be greater than 18 years of age, have moderate to severe JAYLIN (AHI between 15-65), BMI less than 35, have tried CPAP/oral appliance for at least 8 weeks without success, and have appropriate upper airway anatomy (determined by a sleep endoscopy performed by Dr. Von Barrera).    Hypoglossal Nerve Stimulation Pathway:    The sleep surgeon s office will work with the patient through the insurance prior-authorization process (including communications and appeals).    Nasal Procedures:  Nasal obstruction can interfere with nasal breathing during the day and night.  Studies have shown that relief of nasal obstruction can improve the ability of some patients to tolerate positive airway pressure therapy for obstructive sleep apnea1.  Treatment options include medications such as nasal saline, topical corticosteroid and antihistamine sprays, and oral medications such as antihistamines or decongestants. Non-surgical treatments can include external nasal dilators for selected patients. If these are not successful by themselves, surgery can improve the nasal airway either alone or in combination with these other options.      Combination Procedures:  Combination of surgical procedures and other treatments may be recommended, particularly if patients have more than one area of narrowing or persistent positional disease.  The success rate of combination surgery ranges from 66-80%2,3.    References  Isaiah ALMEIDA. The Role of the Nose in Snoring and Obstructive  Sleep Apnoea: An Update.  Eur Arch Otorhinolaryngol. 2011; 268: 1365-73.   Ras SM; Sarah JA; Liborio JR; Pallanch JF; Mandy MB; Silvia SG; Ana Cristina LOMELI. Surgical modifications of the upper airway for obstructive sleep apnea in adults: a systematic review and meta-analysis. SLEEP 2010;33(10):2997-3209. Westley RITTER. Hypopharyngeal surgery in obstructive sleep apnea: an evidence-based medicine review.  Arch Otolaryngol Head Neck Surg. 2006 Feb;132(2):206-13.  Mamadou YH1, Linda Y, [place] REGINA. The efficacy of anatomically based multilevel surgery for obstructive sleep apnea. Otolaryngol Head Neck Surg. 2003 Oct;129(4):327-35.  Kezirian E, Goldberg A. Hypopharyngeal Surgery in Obstructive Sleep Apnea: An Evidence-Based Medicine Review. Arch Otolaryngol Head Neck Surg. 2006 Feb;132(2):206-13.  Rubi NYE et al. Upper-Airway Stimulation for Obstructive Sleep Apnea.  N Engl J Med. 2014 Jan 9;370(2):139-49.  Peker Y et al. Increased Incidence of Cardiovascular Disease in Middle-aged Men with Obstructive Sleep Apnea. Am J Respir Crit Care Med; 2002 166: 159-165  Templeton EM et al. Studying Life Effects and Effectiveness of Palatopharyngoplasty (SLEEP) study: Subjective Outcomes of Isolated Uvulopalatopharyngoplasty. Otolaryngol Head Neck Surg. 2011; 144: 623-631.        WHAT IF I ONLY HAVE SNORING?    Mandibular advancement devices, lateral sleep positioning, long-term weight loss and treatment of nasal allergies have been shown to improve snoring.  Exercising tongue muscles with a game (https://apps.[employer] Interaction/us/emy/soundly-reduce-snoring/zq2045829116) or stimulating the tongue during the day with a device (https://doi.org/10.3390/gcc67325666) have improved snoring in some individuals.    Remember to Drive Safe... Drive Alive     Sleep health profoundly affects your health, mood, and your safety.  Thirty three percent of the population (one in three of us) is not getting enough sleep and many have a sleep disorder. Not getting  enough sleep or having an untreated / undertreated sleep condition may make us sleepy without even knowing it. In fact, our driving could be dramatically impaired due to our sleep health. As your provider, here are some things I would like you to know about driving:     Here are some warning signs for impairment and dangerous drowsy driving:              -Having been awake more than 16 hours               -Looking tired               -Eyelid drooping              -Head nodding (it could be too late at this point)              -Driving for more than 30 minutes     Some things you could do to make the driving safer if you are experiencing some drowsiness:              -Stop driving and rest              -Call for transportation              -Make sure your sleep disorder is adequately treated     Some things that have been shown NOT to work when experiencing drowsiness while driving:              -Turning on the radio              -Opening windows              -Eating any  distracting  /  entertaining  foods (e.g., sunflower seeds, candy, or any other)              -Talking on the phone      Your decision may not only impact your life, but also the life of others. Please, remember to drive safe for yourself and all of us.             [facility]  Cognitive Behavioral Therapy for Insomnia       Core Sleep Training Module    Now that you understand a bit more about how sleep works and what causes insomnia, you are ready to begin CBT-I Core Sleep Training.  This process involves five key strategies that will:    Strengthen your sleep drive and circadian sleep rhythm  Strengthen the link between your bed and sleep    It will be important that you continue to keep track of your sleep using your Insomnia  Moncho or your paper sleep diary.      Core Sleep Strategies    Much like physical activity and healthy eating strengthens our body, studies show that the following Core strategies are key to achieving stronger,  healthier sleep. The focus is on quality of sleep (not quantity) and improving how you feel during the day.     Reduce Your Time in Bed    Spending extra time in bed trying to get more sleep can cause more sleep disruption and weaken sleep efficiency. Sleep efficiency is simply the percentage of time a person spends asleep while in bed. Normal sleep efficiency is thought to be 85% or greater.  By reducing your time in bed, you will be awake longer during the day leading to quicker and deeper sleep at night. This strategy does not reduce the amount of sleep you get, just the time you are awake in bed.                                             Your provider has recommended the following initial sleep schedule determined by your  estimate of average sleep time over the past two weeks plus 30 minutes.  It also consider the best time for you to sleep.     Your Sleep Schedule Prescription                       Total Time in Bed: 6- 7 hours                     Bedtime:  No earlier than 11 pm                      Wake-up Time:  Out of bed every day by 6 am      Don't go to bed until you feel sleepy (not tired or fatigued)     This helps increase your sleep drive by keeping you awake longer.  If you go to bed when you're not sleepy, it gives your brain the wrong message and can lead to frustration.     If you feel sleepy before your prescribed bedtime, find activities that can help you stay awake until it is time for you to go to bed. Even brief naps in the evening can be very disruptive of sleep at night.      Don't stay in bed unless you are sleeping      If you are unable to fall asleep or return to sleep after about 30 minutes, get out of bed. This helps train your brain that the bed is for sleep. It is harmful to your sleep when you are worried or frustrated in bed. Do not read, eat, worry, think about sleep, use mobile phones or tablets, or watch TV in bed. Do not watch the clock during the night.     Go to another  room and do something relaxing. Plan things you can do when you get out of bed. Avoid use of mobile devices or computers when out of bed.    Return to bed only when you feel sleepy again.  Repeat as often as needed throughout the night.      Get out of bed at the same time every day    Getting up at the same time helps set your biological clock. It is important to stick to your wake time no matter how much sleep you got the night before or how you feel in the morning.    Varying your wake can have the same effect as jet lag.  It disrupts your biological clock and makes you feel more tired and exhausted.  Trying to  catch up  on sleep on the weekends only makes things worse and sets you up for a cycle of poor sleep the following weekdays.      Make sure you set an alarm and keep it away from the bed to prevent looking at the clock during the night.       Avoid daytime napping    Avoid daytime napping if possible. Napping partially fulfills your need for sleep and weakens your sleep drive at night.    However, if you find yourself sleepy (not just tired) you can take a brief 15-30-minute nap during the day if needed.  Naps within 7-8 hours of your prescribed wake time are less likely to affect your sleep the coming night.  Sleepy people make more mistakes and may hurt themselves or others. Therefore, safety trumps all other sleep prescriptions.  Never drive or operate equipment if drowsy or sleepy.     Changing Your Sleep Schedule Prescription    After a week, you can adjust your sleep schedule prescription based on how well you are sleeping. Use the following guidelines to change your prescribed time in bed based on information from your Insomnia  emy or paper sleep diary.          Increase Time in Bed by 15 Minutes IF:    Your Insomnia  emy progress tracker estimates that your sleep efficiency has been greater than 90% over the past week (press Progress icon on the home screen and swipe left for this  value).    OR you are falling asleep in less than 30 minutes AND awake less than 30 minutes during the night.              Decrease Time in Bed by 15 Minutes IF:    Your Insomnia  emy sleep diary progress tracker estimates that your sleep efficiency has been less than 80% over the past week (press Progress icon on the home screen and swipe left for this value).    OR it is taking longer than 30 minutes to fall asleep OR you are awake more than 30 minutes during the night.      DO NOT decrease your sleep schedule below 5.5  hours     Change is Challenging    Research shows that making significant changes in sleep habits improves sleep quality and how you feel. Improvement takes time and is not always steady. Change is challenging and can be stressful.  This is especially true if old habits - like spending more time in bed -- were a way to avoid worry about getting enough sleep.

## 2023-10-16 NOTE — PROGRESS NOTES
Virtual Visit Details    Type of service:  Video Visit     Originating Location (pt. Location): Home    Distant Location (provider location):  On-site  Platform used for Video Visit: [facility]    Outpatient Sleep Medicine Consultation:      Name: Mary Lopez MRN# [id number]   Age: 52 year old YOB: 1970     Date of Consultation: October 16, 2023  Consultation is requested by: Jimmie Hoyt MD  [address] [place] [address] Jimmie Hoyt  Primary care provider: Jimmie Hyot       Reason for Sleep Consult:     Mary Lopez is sent by Jimmie Hoyt for a sleep consultation regarding insomnia and snoring.    Patient s Reason for visit  Mary Lopez main reason for visit: unable to stay asleep - only able to sleep on average 15-20 minutes at a time  Patient states problem(s) started: several years ago - prior to June 2023, used alcohol to fall asleep, now alcohol is not an option and current sleep meds are ineffective  Mary Lopez's goals for this visit: to come up with a solution to get regular sleep           Assessment and Plan:     Summary Sleep Diagnoses & Recommendations:   Patient has features and risk factors for possible obstructive sleep apnea including: loud snoring, witnessed apnea, non-refreshing sleep, daytime fatigue/sleepiness, difficulty maintaining sleep, and co-morbid HTN. The STOP-BANG score is 6/8. The pathophysiology, diagnosis and treatment of JAYLIN was discussed. Will start with a Home Sleep Apnea Testing to evaluate for obstructive sleep apnea.    Insomnia, sleep onset and sleep maintenance. Discussed sleep hygiene, stimulus control and sleep restriction.   Sleep restriction: Keep the same wake up time daily. Set alarm every day at the same time.   Stimulus control: Only go to bed when sleepy. No TV in bed.  Leave room if cannot fall asleep within 20 minutes. While out of bed in the middle of the night, avoid bright lights, physical  activity, work activities and chores. Do relaxing activities like reading or listening to relaxing music. Return to bed if starting to feel drowsy or after 30 minutes.        Recommend weight management.      Summary Recommendations:  Orders Placed This Encounter   Procedures    HST-Home Sleep Apnea Test - Noxturnal Returnable     Summary Counseling:    Sleep Testing Reviewed  Obstructive Sleep Apnea Reviewed  Complications of Untreated Sleep Apnea Reviewed      Medical Decision-making:   Educational materials provided in instructions    Total time spent reviewing medical records, history and physical examination, review of previous testing and interpretation as well as documentation on this date:45 minutes    CC: Jimmie Hoyt          History of Present Illness:     Past Sleep Evaluations:    SLEEP-WAKE SCHEDULE:     Work/School Days: Patient goes to school/work: No   Usually gets into bed at 9-10pm  Takes patient about about 15 minutes to fall asleep  Has trouble falling asleep hard to say - daily - no trouble falling asleep initially but once I start waking up, it gets more difficult nights per week  Wakes up in the middle of the night on average 15 times per night times.  Wakes up due to Snorting self awake  He has trouble falling back asleep 5 times  - fairly often times a week.   It usually takes 15 -20 minutes to get back to sleep  Patient is usually up at these days - no set time  Uses alarm: No    Weekends/Non-work Days/All Other Days:  Usually gets into bed at 9-10pm   Takes patient about same as prior answer - about 15 minutes to fall asleep  Patient is usually up at hard to say - no set time  Uses alarm: No    Sleep Need  Patient gets  Daily - about 4 hours sleep on average   Patient thinks he needs about daily - 6-8 hours sleep    Mary Lopez prefers to sleep in this position(s): Side   Patient states they do the following activities in bed: Watch TV    Naps  Patient takes a purposeful nap none  times a week and naps are usually   in duration  He feels better after a nap:    He dozes off unintentionally   days per week  Patient has had a driving accident or near-miss due to sleepiness/drowsiness: No      SLEEP DISRUPTIONS:    Breathing/Snoring  Patient snores:Yes  Other people complain about his snoring: No  Patient has been told he stops breathing in his sleep:Yes  He has issues with the following:      Movement:  Patient gets pain, discomfort, with an urge to move:   no  It happens when he is resting:     It happens more at night:     Patient has been told he kicks his legs at night:   no     Behaviors in Sleep:  Mary Lopez has experienced the following behaviors while sleeping:  sleep talk  He has experienced sudden muscle weakness during the day:  no      Is there anything else you would like your sleep provider to know:        CAFFEINE AND OTHER SUBSTANCES:    Patient consumes caffeinated beverages per day:   1 soda occasionally.   Last caffeine use is usually:    List of any prescribed or over the counter stimulants that patient takes:    List of any prescribed or over the counter sleep medication patient takes:    List of previous sleep medications that patient has tried:  Doxepin, Flexeril, Melatonin  Patient drinks alcohol to help them sleep:  no  Patient drinks alcohol near bedtime:  no    Family History:  Patient has a family member been diagnosed with a sleep disorder:              SCALES:    EPWORTH SLEEPINESS SCALE         10/16/2023     3:17 PM    [place] Sleepiness Scale ( DANIEL Springer  [id number]<br>ESS - [place]/English - Final version - 21 Nov 07 - [facility] [place].)   Sitting and reading Moderate chance of dozing   Watching TV Moderate chance of dozing   Sitting, inactive in a public place (e.g. a theatre or a meeting) Would never doze   As a passenger in a car for an hour without a break Would never doze   Lying down to rest in the afternoon when circumstances permit High  chance of dozing   Sitting and talking to someone Would never doze   Sitting quietly after a lunch without alcohol Would never doze   In a car, while stopped for a few minutes in traffic Would never doze   Red Devil Score (MC) 7   Red Devil Score (Sleep) 7         INSOMNIA SEVERITY INDEX (LUCITA)          10/16/2023     3:15 PM   Insomnia Severity Index (LUCITA)   Difficulty falling asleep 3   Difficulty staying asleep 3   Problems waking up too early 3   How SATISFIED/DISSATISFIED are you with your CURRENT sleep pattern? 4   How NOTICEABLE to others do you think your sleep problem is in terms of impairing the quality of your life? 2   How WORRIED/DISTRESSED are you about your current sleep problem? 2   To what extent do you consider your sleep problem to INTERFERE with your daily functioning (e.g. daytime fatigue, mood, ability to function at work/daily chores, concentration, memory, mood, etc.) CURRENTLY? 3   LUCITA Total Score 20       Guidelines for Scoring/Interpretation:  Total score categories:  0-7 = No clinically significant insomnia   8-14 = Subthreshold insomnia   15-21 = Clinical insomnia (moderate severity)  22-28 = Clinical insomnia (severe)  Used via courtesy of www.[employer].va.gov with permission from Moris Everett PhD., [facility]      STOP BANG         10/16/2023     3:19 PM   STOP BANG Questionnaire (  2008, the American Society of Anesthesiologists, Inc. [employer]anc[employer].)   1. Snoring - Do you snore loudly (louder than talking or loud enough to be heard through closed doors)? Yes   2. Tired - Do you often feel tired, fatigued, or sleepy during daytime? Yes   3. Observed - Has anyone observed you stop breathing during your sleep? Yes   4. Blood pressure - Do you have or are you being treated for high blood pressure? Yes   5. BMI - BMI more than 35 kg/m2? Yes   6. Age - Age over 50 yr old? Yes   7. Neck circumference - Neck circumference greater than 40 cm? No   8. Gender - Gender  "male? Yes   STOP BANG Score (MC): 6 (High risk of JAYLIN)         GAD7         No data to display                  CAGE-AID         No data to display                CAGE-AID reprinted with permission from the [place] Medical Journal, SANTOS Thapa. and BENJIE Diaz, \"Conjoint screening questionnaires for alcohol and drug abuse\" [place] Medical Journal 94: 135-140, 1995.      PATIENT HEALTH QUESTIONNAIRE-9 (PHQ - 9)         No data to display                Developed by Kylah Jordan, Kusum Arshad, Steven Brown and colleagues, with an educational renetta from [employer] No permission required to reproduce, translate, display or distribute.        Allergies:    Allergies   Allergen Reactions    Fish Swelling    Food      baked beans       Medications:    Current Outpatient Medications   Medication Sig Dispense Refill    cyclobenzaprine (FLEXERIL) 10 MG tablet Take 1 tablet (10 mg) by mouth 3 times daily as needed for muscle spasms 30 tablet 1    doxepin (SINEQUAN) 10 MG capsule Take 1 capsule (10 mg) by mouth At Bedtime 60 capsule 1    ferrous sulfate (FEROSUL) 325 (65 Fe) MG tablet Take 1 tablet (325 mg) by mouth daily (with breakfast) 90 tablet 3    lactulose (CHRONULAC) 10 GM/15ML solution TAKE 30 MLS BY MOUTH 2 TIMES DAILY 473 mL 1    melatonin 10 MG TABS tablet Take 1 tablet (10 mg) by mouth nightly as needed for sleep      multivitamin w/minerals (THERA-VIT-M) tablet Take 1 tablet by mouth daily 90 tablet 1    omeprazole (PRILOSEC) 20 MG DR capsule Take 1 capsule (20 mg) by mouth 2 times daily 180 capsule 1    potassium chloride ER (KLOR-CON M) 10 MEQ CR tablet Take 1 tablet (10 mEq) by mouth 2 times daily 180 tablet 1    torsemide (DEMADEX) 10 MG tablet Take 1 tablet (10 mg) by mouth 2 times daily      zinc oxide (DESITIN) 20 % external ointment Apply topically as needed for dry skin or irritation      sodium chloride 1 GM tablet Take 2 tablets (2 g) by mouth 3 times daily (Patient not taking: " Reported on 10/16/2023)         Problem List:  Patient Active Problem List    Diagnosis Date Noted    Alcoholic hepatitis with ascites (H28) 10/03/2023     Priority: Medium    Hyponatremia 09/15/2023     Priority: Medium    Severe malnutrition (H24) 09/06/2023     Priority: Medium    Bilateral leg edema 07/25/2023     Priority: Medium    Alcoholic cirrhosis of liver without ascites (H) 07/13/2023     Priority: Medium    Portal hypertensive gastropathy (H) 07/13/2023     Priority: Medium    Splenomegaly 07/13/2023     Priority: Medium    Secondary esophageal 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 (H) 07/13/2023     Priority: Medium    Anemia due to unknown mechanism 07/13/2023     Priority: Medium    Thrombocytopenia (H24) 07/13/2023     Priority: Medium    Persistent insomnia 07/13/2023     Priority: Medium    Gastric varices 06/27/2023     Priority: Medium    Morbid obesity (H) 09/16/2022     Priority: Medium    IFG (impaired fasting glucose) 12/07/2021     Priority: Medium    Mild hyperlipidemia 12/07/2021     Priority: Medium    Primary hypertension 03/11/2020     Priority: Medium    Tobacco abuse disorder 03/11/2020     Priority: Medium    Alcohol abuse 03/26/2014     Priority: Medium     Sober since 6/2023          Past Medical/Surgical History:  Past Medical History:   Diagnosis Date    ANGEL (acute kidney injury) (H24)     Alcohol use 03/11/2020    Alcoholic cirrhosis of liver without ascites (H) 07/13/2023    Alcoholic hepatitis with ascites (H28) 10/03/2023    Alcoholic hepatitis without ascites (H28) 07/13/2023    Bilateral leg edema 07/25/2023    Closed fracture of one rib of left side 09/14/2023    Concussion without loss of consciousness 03/11/2020    Decompensated hepatic cirrhosis (H) 09/15/2023    Elevated liver enzymes 05/03/2022    Epistaxis     Essential hypertension 03/11/2020    GI bleed     Hyponatremia 09/15/2023    IFG (impaired fasting glucose) 12/07/2021    Mild hyperlipidemia 12/07/2021    Other specified  anemias 07/13/2023    Persistent insomnia 07/13/2023    Portal hypertension (H) 07/13/2023    Scrotal abscess     Secondary esophageal 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 (H) 07/13/2023    Severe sepsis without septic shock (H)     Splenomegaly 07/13/2023    Thrombocytopenia (H24) 07/13/2023    Tobacco abuse disorder 03/11/2020     Past Surgical History:   Procedure Laterality Date    CHOLECYSTECTOMY  unknown    done at [facility]       Social History:  Social History     Socioeconomic History    Marital status:      Spouse name: Not on file    Number of children: Not on file    Years of education: Not on file    Highest education level: Not on file   Occupational History    Occupation: Not working now   Tobacco Use    Smoking status: Never     Passive exposure: Never    Smokeless tobacco: Current     Types: Chew     Last attempt to quit: 1/1/2004   Vaping Use    Vaping Use: Never used   Substance and Sexual Activity    Alcohol use: Yes     Alcohol/week: 12.0 standard drinks of alcohol     Types: 12 Standard drinks or equivalent per week     Comment: 12 drinsk per week    Drug use: Not Currently    Sexual activity: Yes     Partners: Female     Birth control/protection: Male Surgical   Other Topics Concern    Parent/sibling w/ CABG, MI or angioplasty before 65F 55M? No   Social History Narrative    Not on file     Social Determinants of Health     Financial Resource Strain: Low Risk  (10/3/2023)    Financial Resource Strain     Within the past 12 months, have you or your family members you live with been unable to get utilities (heat, electricity) when it was really needed?: No   Food Insecurity: Low Risk  (10/3/2023)    Food Insecurity     Within the past 12 months, did you worry that your food would run out before you got money to buy more?: No     Within the past 12 months, did the food you bought just not last and you didn t have money to get more?: No   Transportation Needs: Low Risk  (10/3/2023)     "Transportation Needs     Within the past 12 months, has lack of transportation kept you from medical appointments, getting your medicines, non-medical meetings or appointments, work, or from getting things that you need?: No   Physical Activity: Not on file   Stress: Not on file   Social Connections: Not on file   Interpersonal Safety: Not on file   Housing Stability: Low Risk  (10/3/2023)    Housing Stability     Do you have housing? : Yes     Are you worried about losing your housing?: No       Family History:  Family History   Problem Relation Age of Onset    Chronic Obstructive Pulmonary Disease Mother     Lung Cancer Mother 81    Morbid Obesity Father     Diabetes Father     Diabetes Type 2  Brother        Review of Systems:  A complete review of systems reviewed by me is negative with the exeption of what has been mentioned in the history of present illness.        Physical Examination:  Vitals: Ht 1.778 m (5' 10\")   Wt 99.8 kg (220 lb)   BMI 31.57 kg/m    BMI= Body mass index is 31.57 kg/m .           GENERAL APPEARANCE: alert and no distress  EYES: Eyes grossly normal to inspection  NECK: no asymmetry, masses, or scars  RESP: breathing is non-labored   NEURO: mentation intact and speech normal  PSYCH: affect normal/bright  Mallampati Class:   Tonsillar Stage:          Data: All pertinent previous laboratory data reviewed     Recent Labs   Lab Test 10/10/23  1221 10/03/23  1649    134*   POTASSIUM 3.3* 3.1*   CHLORIDE 95* 95*   CO2 29 27   ANIONGAP 11 12   * 242*   BUN 7.5 11.4   CR 0.90 0.90   MEG 7.9* 8.0*       Recent Labs   Lab Test 10/10/23  1221   WBC 9.8   RBC 2.42*   HGB 8.5*   HCT 25.9*   *   MCH 35.1*   MCHC 32.8   RDW 16.0*   *       Recent Labs   Lab Test 09/26/23  1701   PROTTOTAL 6.2*   ALBUMIN 2.2*   BILITOTAL 9.3*   ALKPHOS 216*   AST 73*   ALT 39       TSH (mU/L)   Date Value   03/12/2022 2.18       Iron Sat Index   Date/Time Value Ref Range Status   08/23/2023 " 10:18 AM   Final     Comment:     Unable to calculate:  UIBC or Iron value is outside detectable level.     Ferritin   Date/Time Value Ref Range Status   08/23/2023 10:18 AM 4,261 (H) 31 - 409 ng/mL Final       Chest CT:   CT CHEST W/O CONTRAST 09/23/2023    Narrative  CT chest without contrast    Indication left-sided chest wall pain    COMPARISON: No prior CT scans. Most recent available plain films on  PACS 2/1/2006    FINDINGS: No contrast. The included thyroid appears unremarkable.  Nonenlarged mediastinal lymph nodes are present. Breast tissues show  symmetric appearing mild gynecomastia bilaterally. No enlarged  axillary, mediastinal or hilar lymph nodes.  Small right pleural effusion. Extensive perihepatic and mild  perisplenic ascites. Probable varices in the left upper abdomen  adjacent to what is likely a normal appearing adrenal gland although  this is somewhat indistinct. Mesenteric edema likely related to the  ascites. Cholecystectomy.  No pericardial effusion. Heart upper normal size. Trace left pleural  effusion as well. Main pulmonary artery upper normal size  approximately 3.2 cm. Thoracic aorta normal size. Possible epidermal  inclusion cyst laterally at the left axilla (series 2 image 16) versus  axillary lymph node, density measurement is approximately +26  Hounsfield units. Again is incompletely imaged but measures  approximately 2.2 x 1.3 cm.  Anatomical variant bovine aortic arch with common origin of the right  brachiocephalic and left common carotid arteries off the aortic arch.    Bone detail shows no suspicious sclerotic or lytic/destructive lesion.  No advanced degenerative or erosive changes. Bones are mildly  osteopenic.    Detail of the lungs shows consolidation with air bronchograms in the  superior right middle lobe posteriorly as well as some consolidation  with air bronchograms in the right lower lobe. Differential would be  infectious versus atelectatic opacity. Other peripheral  reticular  opacities noted in the lingula with some mild  consolidative/atelectatic opacities in the lateral left lower lobe as  well. No discrete pulmonary nodule. Major central airways are nicely  patent.    Impression  IMPRESSION: No obviously acute-appearing or other suspicious-appearing  left chest wall findings account for pain. Bilateral small pleural  effusions and areas of atelectasis/consolidation in the right middle  lobe, right lower lobe, lingula and left lower lobe. Please correlate  for infection clinically. Moderate upper abdominal ascites. Apparent  varices in the left upper abdomen which may be related to chronic  liver disease. Cholecystectomy status.    SOULEYMANE CALL MD      SYSTEM ID:  [id number]      Bina Wadsworth PA-C 10/16/2023

## 2023-10-17 ENCOUNTER — LAB (OUTPATIENT)
Dept: LAB | Facility: CLINIC | Age: 53
End: 2023-10-17
Payer: COMMERCIAL

## 2023-10-17 DIAGNOSIS — D64.9 ANEMIA DUE TO UNKNOWN MECHANISM: ICD-10-CM

## 2023-10-17 DIAGNOSIS — D64.9 ANEMIA, UNSPECIFIED TYPE: ICD-10-CM

## 2023-10-17 LAB
ANION GAP SERPL CALCULATED.3IONS-SCNC: 11 MMOL/L (ref 7–15)
BASO+EOS+MONOS # BLD AUTO: ABNORMAL 10*3/UL
BASO+EOS+MONOS NFR BLD AUTO: ABNORMAL %
BASOPHILS # BLD AUTO: 0.1 10E3/UL (ref 0–0.2)
BASOPHILS NFR BLD AUTO: 1 %
BUN SERPL-MCNC: 9.2 MG/DL (ref 6–20)
CALCIUM SERPL-MCNC: 8.2 MG/DL (ref 8.6–10)
CHLORIDE SERPL-SCNC: 92 MMOL/L (ref 98–107)
CREAT SERPL-MCNC: 0.95 MG/DL (ref 0.67–1.17)
DEPRECATED HCO3 PLAS-SCNC: 30 MMOL/L (ref 22–29)
EGFRCR SERPLBLD CKD-EPI 2021: >90 ML/MIN/1.73M2
EOSINOPHIL # BLD AUTO: 0.3 10E3/UL (ref 0–0.7)
EOSINOPHIL NFR BLD AUTO: 4 %
ERYTHROCYTE [DISTWIDTH] IN BLOOD BY AUTOMATED COUNT: 14.9 % (ref 10–15)
GLUCOSE SERPL-MCNC: 250 MG/DL (ref 70–99)
HCT VFR BLD AUTO: 28.1 % (ref 40–53)
HGB BLD-MCNC: 9.3 G/DL (ref 13.3–17.7)
IMM GRANULOCYTES # BLD: 0.1 10E3/UL
IMM GRANULOCYTES NFR BLD: 1 %
LYMPHOCYTES # BLD AUTO: 1.2 10E3/UL (ref 0.8–5.3)
LYMPHOCYTES NFR BLD AUTO: 15 %
MCH RBC QN AUTO: 35 PG (ref 26.5–33)
MCHC RBC AUTO-ENTMCNC: 33.1 G/DL (ref 31.5–36.5)
MCV RBC AUTO: 106 FL (ref 78–100)
MONOCYTES # BLD AUTO: 0.8 10E3/UL (ref 0–1.3)
MONOCYTES NFR BLD AUTO: 11 %
NEUTROPHILS # BLD AUTO: 5.3 10E3/UL (ref 1.6–8.3)
NEUTROPHILS NFR BLD AUTO: 68 %
NRBC # BLD AUTO: 0 10E3/UL
NRBC BLD AUTO-RTO: 0 /100
PLATELET # BLD AUTO: 98 10E3/UL (ref 150–450)
POTASSIUM SERPL-SCNC: 3.4 MMOL/L (ref 3.4–5.3)
RBC # BLD AUTO: 2.66 10E6/UL (ref 4.4–5.9)
SODIUM SERPL-SCNC: 133 MMOL/L (ref 135–145)
WBC # BLD AUTO: 7.7 10E3/UL (ref 4–11)

## 2023-10-17 PROCEDURE — 85025 COMPLETE CBC W/AUTO DIFF WBC: CPT

## 2023-10-17 PROCEDURE — 80048 BASIC METABOLIC PNL TOTAL CA: CPT

## 2023-10-17 PROCEDURE — 36415 COLL VENOUS BLD VENIPUNCTURE: CPT

## 2023-10-17 RX ORDER — TORSEMIDE 10 MG/1
10 TABLET ORAL 2 TIMES DAILY
Qty: 180 TABLET | Refills: 1 | Status: SHIPPED | OUTPATIENT
Start: 2023-10-17 | End: 2023-10-24

## 2023-10-17 NOTE — TELEPHONE ENCOUNTER
Sent [employer] message with provider's message below.     BRIDGET Smith  [facility]

## 2023-10-17 NOTE — TELEPHONE ENCOUNTER
Please have patient continue torsemide twice a day for now.  Continue to monitor weight and we will see what the next lab looks like before making further decision.

## 2023-10-18 ENCOUNTER — TELEPHONE (OUTPATIENT)
Dept: FAMILY MEDICINE | Facility: CLINIC | Age: 53
End: 2023-10-18
Payer: COMMERCIAL

## 2023-10-18 NOTE — TELEPHONE ENCOUNTER
Forms/Letter Request    Type of form/letter:  [facility]     Have you been seen for this request: N/A    Do we have the form/letter: Yes: Will place form on providers desk for review/signature    When is form/letter needed by: asap    How would you like the form/letter returned: Fax : [phone]

## 2023-10-19 ENCOUNTER — MYC MEDICAL ADVICE (OUTPATIENT)
Dept: FAMILY MEDICINE | Facility: CLINIC | Age: 53
End: 2023-10-19
Payer: COMMERCIAL

## 2023-10-19 DIAGNOSIS — K70.30 ALCOHOLIC CIRRHOSIS OF LIVER WITHOUT ASCITES (H): ICD-10-CM

## 2023-10-19 RX ORDER — LACTULOSE 10 G/15ML
SOLUTION ORAL
Qty: 473 ML | Refills: 11 | Status: ON HOLD | OUTPATIENT
Start: 2023-10-19 | End: 2024-03-21

## 2023-10-20 ENCOUNTER — TELEPHONE (OUTPATIENT)
Dept: FAMILY MEDICINE | Facility: CLINIC | Age: 53
End: 2023-10-20
Payer: COMMERCIAL

## 2023-10-20 NOTE — TELEPHONE ENCOUNTER
Forms/Letter Request    Type of form/letter:  [facility]    Have you been seen for this request: N/A    Do we have the form/letter: Yes: Will place form on providers desk    When is form/letter needed by: asap    How would you like the form/letter returned: Fax : [phone]

## 2023-10-24 ENCOUNTER — LAB (OUTPATIENT)
Dept: LAB | Facility: CLINIC | Age: 53
End: 2023-10-24
Payer: COMMERCIAL

## 2023-10-24 ENCOUNTER — MYC MEDICAL ADVICE (OUTPATIENT)
Dept: FAMILY MEDICINE | Facility: CLINIC | Age: 53
End: 2023-10-24

## 2023-10-24 ENCOUNTER — TELEPHONE (OUTPATIENT)
Dept: FAMILY MEDICINE | Facility: CLINIC | Age: 53
End: 2023-10-24

## 2023-10-24 DIAGNOSIS — K72.90 DECOMPENSATED HEPATIC CIRRHOSIS (H): ICD-10-CM

## 2023-10-24 DIAGNOSIS — D50.0 IRON DEFICIENCY ANEMIA DUE TO CHRONIC BLOOD LOSS: ICD-10-CM

## 2023-10-24 DIAGNOSIS — D64.9 ANEMIA, UNSPECIFIED TYPE: ICD-10-CM

## 2023-10-24 DIAGNOSIS — K74.60 DECOMPENSATED HEPATIC CIRRHOSIS (H): ICD-10-CM

## 2023-10-24 DIAGNOSIS — E11.9 TYPE 2 DIABETES MELLITUS WITHOUT COMPLICATION, WITHOUT LONG-TERM CURRENT USE OF INSULIN (H): Primary | ICD-10-CM

## 2023-10-24 DIAGNOSIS — D64.9 ANEMIA DUE TO UNKNOWN MECHANISM: ICD-10-CM

## 2023-10-24 LAB
ANION GAP SERPL CALCULATED.3IONS-SCNC: 11 MMOL/L (ref 7–15)
BASOPHILS # BLD AUTO: 0.1 10E3/UL (ref 0–0.2)
BASOPHILS NFR BLD AUTO: 1 %
BUN SERPL-MCNC: 11.3 MG/DL (ref 6–20)
CALCIUM SERPL-MCNC: 9.1 MG/DL (ref 8.6–10)
CHLORIDE SERPL-SCNC: 89 MMOL/L (ref 98–107)
CREAT SERPL-MCNC: 0.94 MG/DL (ref 0.67–1.17)
DEPRECATED HCO3 PLAS-SCNC: 30 MMOL/L (ref 22–29)
EGFRCR SERPLBLD CKD-EPI 2021: >90 ML/MIN/1.73M2
EOSINOPHIL # BLD AUTO: 0.2 10E3/UL (ref 0–0.7)
EOSINOPHIL NFR BLD AUTO: 3 %
ERYTHROCYTE [DISTWIDTH] IN BLOOD BY AUTOMATED COUNT: 13.8 % (ref 10–15)
GLUCOSE SERPL-MCNC: 546 MG/DL (ref 70–99)
HCT VFR BLD AUTO: 30.6 % (ref 40–53)
HGB BLD-MCNC: 10 G/DL (ref 13.3–17.7)
IMM GRANULOCYTES # BLD: 0 10E3/UL
IMM GRANULOCYTES NFR BLD: 1 %
LYMPHOCYTES # BLD AUTO: 1.3 10E3/UL (ref 0.8–5.3)
LYMPHOCYTES NFR BLD AUTO: 18 %
MCH RBC QN AUTO: 33.3 PG (ref 26.5–33)
MCHC RBC AUTO-ENTMCNC: 32.7 G/DL (ref 31.5–36.5)
MCV RBC AUTO: 102 FL (ref 78–100)
MONOCYTES # BLD AUTO: 0.8 10E3/UL (ref 0–1.3)
MONOCYTES NFR BLD AUTO: 11 %
NEUTROPHILS # BLD AUTO: 4.8 10E3/UL (ref 1.6–8.3)
NEUTROPHILS NFR BLD AUTO: 66 %
NRBC # BLD AUTO: 0 10E3/UL
NRBC BLD AUTO-RTO: 0 /100
PLATELET # BLD AUTO: 96 10E3/UL (ref 150–450)
POTASSIUM SERPL-SCNC: 3.4 MMOL/L (ref 3.4–5.3)
RBC # BLD AUTO: 3 10E6/UL (ref 4.4–5.9)
SODIUM SERPL-SCNC: 130 MMOL/L (ref 135–145)
WBC # BLD AUTO: 7.1 10E3/UL (ref 4–11)

## 2023-10-24 PROCEDURE — 80048 BASIC METABOLIC PNL TOTAL CA: CPT

## 2023-10-24 PROCEDURE — 85025 COMPLETE CBC W/AUTO DIFF WBC: CPT

## 2023-10-24 PROCEDURE — 36415 COLL VENOUS BLD VENIPUNCTURE: CPT

## 2023-10-24 RX ORDER — METFORMIN HCL 500 MG
500 TABLET, EXTENDED RELEASE 24 HR ORAL
Qty: 60 TABLET | Refills: 1 | Status: SHIPPED | OUTPATIENT
Start: 2023-10-24 | End: 2024-02-19

## 2023-10-24 RX ORDER — TORSEMIDE 10 MG/1
10 TABLET ORAL DAILY
COMMUNITY
Start: 2023-10-24 | End: 2023-12-22

## 2023-10-24 RX ORDER — LANCETS
EACH MISCELLANEOUS
Qty: 100 EACH | Refills: 6 | Status: SHIPPED | OUTPATIENT
Start: 2023-10-24

## 2023-10-24 NOTE — TELEPHONE ENCOUNTER
Talk to the patient's wife today.  Patient was present.  Advised to reduce torsemide to 10 mg once a day

## 2023-10-24 NOTE — TELEPHONE ENCOUNTER
Patient's blood sugar is high today, nonfasting at 546.   The patient is medically informed.  Reviewed rest of the which actually looks good.  I think at this point we do have to start him on the diabetes medication.  Decided to put him on metformin 500 mg a day.  It is metabolized by the kidney.  Advised to check blood sugars twice a day and glucose strips and meter sent to his pharmacy advise to give me the blood sugar readings in a couple weeks and then we can readjust metformin.  The other option would be a long-acting insulin like Lantus.  Concern obviously would be of hypoglycemia.  Wife indicated that he still has mental cloudiness so I decided to check ammonia level with.  We will do it with the next blood draw.

## 2023-10-24 NOTE — TELEPHONE ENCOUNTER
Routing to provider to review and advise.  Please see [employer] message.  Should the patient schedule an appointment?      Kristina Kjellberg, MSN, RN  [facility] Triage

## 2023-10-24 NOTE — TELEPHONE ENCOUNTER
[facility] lab calling with critical lab  -     GLUCOSE = 546      To provider to advise      Feli BAÑUELOSN, RN

## 2023-10-26 DIAGNOSIS — K76.6 PORTAL HYPERTENSION (H): ICD-10-CM

## 2023-10-26 DIAGNOSIS — K70.31 ALCOHOLIC CIRRHOSIS OF LIVER WITH ASCITES (H): Primary | ICD-10-CM

## 2023-10-31 ENCOUNTER — TELEPHONE (OUTPATIENT)
Dept: FAMILY MEDICINE | Facility: CLINIC | Age: 53
End: 2023-10-31

## 2023-10-31 ENCOUNTER — MYC MEDICAL ADVICE (OUTPATIENT)
Dept: FAMILY MEDICINE | Facility: CLINIC | Age: 53
End: 2023-10-31

## 2023-10-31 ENCOUNTER — LAB (OUTPATIENT)
Dept: LAB | Facility: CLINIC | Age: 53
End: 2023-10-31
Payer: COMMERCIAL

## 2023-10-31 DIAGNOSIS — D64.9 ANEMIA DUE TO UNKNOWN MECHANISM: ICD-10-CM

## 2023-10-31 DIAGNOSIS — K74.60 DECOMPENSATED HEPATIC CIRRHOSIS (H): ICD-10-CM

## 2023-10-31 DIAGNOSIS — E11.65 TYPE 2 DIABETES MELLITUS WITH HYPERGLYCEMIA, WITH LONG-TERM CURRENT USE OF INSULIN (H): ICD-10-CM

## 2023-10-31 DIAGNOSIS — Z79.4 TYPE 2 DIABETES MELLITUS WITH HYPERGLYCEMIA, WITH LONG-TERM CURRENT USE OF INSULIN (H): ICD-10-CM

## 2023-10-31 DIAGNOSIS — K72.90 DECOMPENSATED HEPATIC CIRRHOSIS (H): ICD-10-CM

## 2023-10-31 DIAGNOSIS — D64.9 ANEMIA, UNSPECIFIED TYPE: ICD-10-CM

## 2023-10-31 DIAGNOSIS — E13.9 LADA (LATENT AUTOIMMUNE DIABETES IN ADULTS), MANAGED AS TYPE 2 (H): Primary | ICD-10-CM

## 2023-10-31 DIAGNOSIS — Z79.4 TYPE 2 DIABETES MELLITUS WITH HYPERGLYCEMIA, WITH LONG-TERM CURRENT USE OF INSULIN (H): Primary | ICD-10-CM

## 2023-10-31 DIAGNOSIS — E11.65 TYPE 2 DIABETES MELLITUS WITH HYPERGLYCEMIA, WITH LONG-TERM CURRENT USE OF INSULIN (H): Primary | ICD-10-CM

## 2023-10-31 LAB
AMMONIA PLAS-SCNC: 57 UMOL/L (ref 16–60)
ANION GAP SERPL CALCULATED.3IONS-SCNC: 13 MMOL/L (ref 7–15)
BASOPHILS # BLD AUTO: 0 10E3/UL (ref 0–0.2)
BASOPHILS NFR BLD AUTO: 1 %
BUN SERPL-MCNC: 9.4 MG/DL (ref 6–20)
CALCIUM SERPL-MCNC: 9.6 MG/DL (ref 8.6–10)
CHLORIDE SERPL-SCNC: 85 MMOL/L (ref 98–107)
CREAT SERPL-MCNC: 0.79 MG/DL (ref 0.67–1.17)
DEPRECATED HCO3 PLAS-SCNC: 28 MMOL/L (ref 22–29)
EGFRCR SERPLBLD CKD-EPI 2021: >90 ML/MIN/1.73M2
EOSINOPHIL # BLD AUTO: 0.1 10E3/UL (ref 0–0.7)
EOSINOPHIL NFR BLD AUTO: 2 %
ERYTHROCYTE [DISTWIDTH] IN BLOOD BY AUTOMATED COUNT: 14.1 % (ref 10–15)
GLUCOSE SERPL-MCNC: 761 MG/DL (ref 70–99)
HCT VFR BLD AUTO: 32.2 % (ref 40–53)
HGB BLD-MCNC: 10.5 G/DL (ref 13.3–17.7)
IMM GRANULOCYTES # BLD: 0 10E3/UL
IMM GRANULOCYTES NFR BLD: 1 %
LYMPHOCYTES # BLD AUTO: 0.7 10E3/UL (ref 0.8–5.3)
LYMPHOCYTES NFR BLD AUTO: 13 %
MCH RBC QN AUTO: 33.8 PG (ref 26.5–33)
MCHC RBC AUTO-ENTMCNC: 32.6 G/DL (ref 31.5–36.5)
MCV RBC AUTO: 104 FL (ref 78–100)
MONOCYTES # BLD AUTO: 0.6 10E3/UL (ref 0–1.3)
MONOCYTES NFR BLD AUTO: 11 %
NEUTROPHILS # BLD AUTO: 3.9 10E3/UL (ref 1.6–8.3)
NEUTROPHILS NFR BLD AUTO: 72 %
NRBC # BLD AUTO: 0 10E3/UL
NRBC BLD AUTO-RTO: 0 /100
PLATELET # BLD AUTO: 88 10E3/UL (ref 150–450)
POTASSIUM SERPL-SCNC: 4.4 MMOL/L (ref 3.4–5.3)
RBC # BLD AUTO: 3.11 10E6/UL (ref 4.4–5.9)
SODIUM SERPL-SCNC: 126 MMOL/L (ref 135–145)
WBC # BLD AUTO: 5.3 10E3/UL (ref 4–11)

## 2023-10-31 PROCEDURE — 82140 ASSAY OF AMMONIA: CPT

## 2023-10-31 PROCEDURE — 84681 ASSAY OF C-PEPTIDE: CPT

## 2023-10-31 PROCEDURE — 36415 COLL VENOUS BLD VENIPUNCTURE: CPT

## 2023-10-31 PROCEDURE — 85025 COMPLETE CBC W/AUTO DIFF WBC: CPT

## 2023-10-31 PROCEDURE — 83036 HEMOGLOBIN GLYCOSYLATED A1C: CPT

## 2023-10-31 PROCEDURE — 80048 BASIC METABOLIC PNL TOTAL CA: CPT

## 2023-10-31 NOTE — TELEPHONE ENCOUNTER
Talk to the patient's wife.  Has been monitoring blood sugar twice a day at home.  When he woke up today the blood sugar was 480.  Then later on it.  Too high.  Today at the lab it was high at 761.  It is possible that he has CLAY.  I will order a C-peptide and A1c on the lab that was drawn today.  Unable to order antibodies on that sample.  I will start him on Lantus (glargine) 20 units daily.  Discussed signs of hypoglycemia.  Advised to monitor blood sugar at least twice a day and report to me early next week.

## 2023-10-31 NOTE — TELEPHONE ENCOUNTER
Feli from [facility] laboratory called to report a critical lab value on patient.    Glucose: 761    Patient had BMP drawn today at lab.  History of IFG and Type 2 DM, recently started on Metformin    Last glucose was 546 on 10/24/23.      Routing to provider to review and advise.        Meme Alfredo RN  Clinical Triage/Primary Care  [facility]

## 2023-10-31 NOTE — TELEPHONE ENCOUNTER
Forms/Letter Request    Type of form/letter:  [facility]    Have you been seen for this request: N/A    Do we have the form/letter: Yes: Will place on providers desk for review/signature    When is form/letter needed by: asap    How would you like the form/letter returned: Fax : [phone]

## 2023-11-01 LAB — HBA1C MFR BLD: 8.1 % (ref 0–5.6)

## 2023-11-01 NOTE — TELEPHONE ENCOUNTER
Please advise if you need appointment for this form request.    BRIDGET Vital  [facility] Primary Care Triage

## 2023-11-01 NOTE — TELEPHONE ENCOUNTER
Routing to provider to review and advise.     Celsa Jiménez, EDDAN, RN   [facility] Primary Care [facility]

## 2023-11-01 NOTE — TELEPHONE ENCOUNTER
Patient last had visit on 10/3/23. Routing to provider to review and advise.     Celsa Jiménez, EDDAN, RN   [facility] Primary Care [facility]

## 2023-11-02 ENCOUNTER — TELEPHONE (OUTPATIENT)
Dept: FAMILY MEDICINE | Facility: CLINIC | Age: 53
End: 2023-11-02

## 2023-11-02 ENCOUNTER — TELEPHONE (OUTPATIENT)
Dept: ENDOCRINOLOGY | Facility: CLINIC | Age: 53
End: 2023-11-02

## 2023-11-02 DIAGNOSIS — E11.65 TYPE 2 DIABETES MELLITUS WITH HYPERGLYCEMIA, WITH LONG-TERM CURRENT USE OF INSULIN (H): Primary | ICD-10-CM

## 2023-11-02 DIAGNOSIS — Z79.4 TYPE 2 DIABETES MELLITUS WITH HYPERGLYCEMIA, WITH LONG-TERM CURRENT USE OF INSULIN (H): Primary | ICD-10-CM

## 2023-11-02 LAB — C PEPTIDE SERPL-MCNC: 3.7 NG/ML (ref 0.9–6.9)

## 2023-11-02 NOTE — TELEPHONE ENCOUNTER
Patient call:     Appointment type: new diabetes   Provider: see below   Return date:see below   Speciality phone number: [phone]  Additional appointment(s) needed:   Additional notes: LVM and sent myc x1     Pt currently scheduled with Evelyn Silva in  on 1/31    schedule with provider within 2 months. Ok to use DAVID.   Daniella Saenz RN on 11/2/2023 at 9:50 AM      Sooner available appts:   11/7 Perez in person [facility]   11/22 Moheet in person    12/12 Perez virtual [facility]   12/12 Janice in person [facility]   12/13 Ferreira virtual [facility]   12/14 Moheet virtual [facility]     Please note that the above appointment(s) will require manual scheduling as they are marked as DAVID and will not appear using auto search. Do not schedule the patient if another patient has already been scheduled in the requested appointment slot.       Maurizio Barrios on 11/2/2023 at 5:27 PM

## 2023-11-02 NOTE — TELEPHONE ENCOUNTER
Prior Authorization Retail Medication Request    Medication/Dose: insulin glargine (LANTUS PEN) 100 UNIT/ML pen    ICD code (if different than what is on RX):    Previously Tried and Failed:    Rationale:    Type 2 diabetes mellitus with hyperglycemia, with long-term current use of insulin          Insurance Name:  [employer]   Insurance ID:  [id number]        Pharmacy Information (if different than what is on RX)  Name:    Phone:

## 2023-11-03 ENCOUNTER — VIRTUAL VISIT (OUTPATIENT)
Dept: EDUCATION SERVICES | Facility: CLINIC | Age: 53
End: 2023-11-03
Attending: INTERNAL MEDICINE
Payer: COMMERCIAL

## 2023-11-03 ENCOUNTER — TELEPHONE (OUTPATIENT)
Dept: FAMILY MEDICINE | Facility: CLINIC | Age: 53
End: 2023-11-03

## 2023-11-03 ENCOUNTER — MYC MEDICAL ADVICE (OUTPATIENT)
Dept: FAMILY MEDICINE | Facility: CLINIC | Age: 53
End: 2023-11-03

## 2023-11-03 ENCOUNTER — LAB (OUTPATIENT)
Dept: LAB | Facility: CLINIC | Age: 53
End: 2023-11-03
Payer: COMMERCIAL

## 2023-11-03 DIAGNOSIS — E13.9 LADA (LATENT AUTOIMMUNE DIABETES IN ADULTS), MANAGED AS TYPE 2 (H): ICD-10-CM

## 2023-11-03 PROCEDURE — 86341 ISLET CELL ANTIBODY: CPT | Mod: 90

## 2023-11-03 PROCEDURE — 36415 COLL VENOUS BLD VENIPUNCTURE: CPT

## 2023-11-03 PROCEDURE — G0108 DIAB MANAGE TRN  PER INDIV: HCPCS | Mod: VID | Performed by: NUTRITIONIST

## 2023-11-03 PROCEDURE — 99000 SPECIMEN HANDLING OFFICE-LAB: CPT

## 2023-11-03 RX ORDER — BLOOD-GLUCOSE SENSOR
1 EACH MISCELLANEOUS
Qty: 2 EACH | Refills: 5 | OUTPATIENT
Start: 2023-11-03 | End: 2023-11-07

## 2023-11-03 NOTE — CONFIDENTIAL NOTE
RECORDS RECEIVED FROM: internal /ce   DATE RECEIVED: 11.28.23    NOTES (FOR ALL VISITS) STATUS DETAILS   OFFICE NOTES from referring provider internal    Jimmie Hoyt MD        MEDICATION LIST internal     IMAGING        CT (HEAD/NECK/CHEST/ABDOMEN) internal /ce Internal  9.23.23,   [facility]- 6.26.23      LABS     DIABETES: HBGA1C, CREATININE, FASTING LIPIDS, MICROALBUMIN URINE, POTASSIUM, TSH, T4    THYROID: TSH, T4, CBC, THYRODLONULIN, TOTAL T3, FREE T4, CALCITONIN, CEA internal  HBGA1C- 10.31.23  Cbc- 10.31.23  BMP- 10.31.23  Cmp- 9.11.23  TSH- 9.5.23  Lipid- 3.12.22  TSH/T4- 3.12.22

## 2023-11-03 NOTE — NURSING NOTE
Is the patient currently in the state of [place]? YES    Visit mode:VIDEO    If the visit is dropped, the patient can be reconnected by: VIDEO VISIT: Text to cell phone:   Telephone Information:   Mobile [phone]       Will anyone else be joining the visit? Yes  (If patient encounters technical issues they should call [phone]    How would you like to obtain your AVS? MyChart    Are changes needed to the allergy or medication list? No    Reason for visit: Consult (New Patient)    Autumn CHRISTY

## 2023-11-03 NOTE — PROGRESS NOTES
Joined the call at 11/3/2023, 2:01:21 pm.  Left the call at 11/3/2023, 3:00:19 pm.  You were on the call for 58 minutes 58 seconds .    Diabetes Self-Management Education & Support    Mary EPPERSON Lopez presents today for education related to diabetes. CLAY. Labs pending to confirm diagnosis.   Patient is being treated with:  insulin  He is accompanied by spouse - Adina     Year of diagnosis: 2023 new diagnosis  Referring provider:  Dr. Hoyt  Living Situation: with Spouse  Employment: not asked     ASSESSMENT:    Taking Medication:     Current Diabetes Management per Patient:  Taking diabetes medications? yes:     Diabetes Medication(s)       Biguanides       metFORMIN (GLUCOPHAGE XR) 500 MG 24 hr tablet    Take 1 tablet (500 mg) by mouth daily (with dinner)      Insulin       insulin glargine (LANTUS PEN) 100 UNIT/ML pen    Inject 20 Units Subcutaneous at bedtime          Taking Lantus at at 9pm. Just started last night.     Monitoring    Glucose readings - Have all been Hi  466mg/dL after eating a sandwich and a boost shake about an hour ago   Uses Pharmacy - [facility]   Patient glucose self monitoring as follows:  They have been checking glucose twice daily. Wonder when exactly to test. Spouse is helping a lot.     Patient's most recent   Lab Results   Component Value Date    A1C 8.1 10/31/2023      Patient's A1C goal: <8.0    Problem Solving:      Patient is at risk of hypoglycemia?: YES  Hospitalizations for hyper or hypoglycemia: No    EDUCATION and INSTRUCTION PROVIDED AT THIS VISIT:       Patient seen today for new diagnosis of CLAY. Had labs done earlier today. Referred by his PCP Dr. Hoyt.     This is all very new for Mary, not much understanding of diabetes, asks to not use too many big words today.  We discussed the difference between type 1, type 2 and CLAY including risk factors, pathophysiology and differences in treatment options. We also discussed basic glucose metabolism and  function of insulin.    Discussed how to use insulin. He was prescribed 20 units Lantus by Dr. Hoyt and started that last night. Adina gave patient the injection. Reviewed injection technique and storage. They are doing everything correctly.     We reviewed hypoglycemia and rule of 15. I will send them a handout with step by step instructions.    Discussed glucose monitoring. They have been checking twice daily. This is the first time an actual glucose reading has registered on the meter. Discussed when to test, how often, and CGM. Patient and spouse would like to try the graham 3 sensor. Adina checked patients phone and was able to download the graham 3 emy. I also checked the phone compatibility guide and his phone is compatible. Will place order. They can start on their own or wait to start with me on 11/10. I don't think they would have any trouble starting it on their own however.     We talked about diet and what to do in present time. Reviewed what foods have carbs. Discussed keeping refined and high sugar foods to a minimum right now. No regular soda. We discussed some carb free beverages that are an alternative. Patient has already cut way back on soda.     Patient-stated goal written and given to Mary Lopez.  Verbalized and demonstrated understanding of instructions.     PLAN:  Check glucose twice daily. Fasting and before dinner. Goal eventually for that time is .  Order placed for graham 3 sensors.   Continue to limit sweets. Completely avoid regular soda and juice (unless treating a low glucose).  Review hypoglycemia handout. Sent to  [email or web address]    Today's glucose sent to Dr. Hoyt with an update about our visit and follow up plan.     [email or web address]  [phone]    FOLLOW-UP:      Telephone visit to review glucose readings and adjust insulin on Monday at 2pm  Video follow up on Friday 11/10 at 8:30am    Time spent with patient at today's visit was 58  minutes.      Any diabetes medication dose changes were made via the CDE Protocol and Collaborative Practice Agreement with [place] and  Vishal.  A copy of this encounter was provided to patient's referring provider.

## 2023-11-03 NOTE — TELEPHONE ENCOUNTER
PA Initiation    Medication: FREESTYLE ZOLTAN 3 SENSOR [facility]  Insurance Company: [employer] - Phone [phone] Fax [phone]  Pharmacy Filling the Rx:    Filling Pharmacy Phone:    Filling Pharmacy Fax:    Start Date: 11/3/2023    Key: [id number]

## 2023-11-06 ENCOUNTER — TELEPHONE (OUTPATIENT)
Dept: EDUCATION SERVICES | Facility: CLINIC | Age: 53
End: 2023-11-06
Payer: COMMERCIAL

## 2023-11-06 ENCOUNTER — MYC MEDICAL ADVICE (OUTPATIENT)
Dept: EDUCATION SERVICES | Facility: CLINIC | Age: 53
End: 2023-11-06

## 2023-11-06 DIAGNOSIS — E13.9 LADA (LATENT AUTOIMMUNE DIABETES IN ADULTS), MANAGED AS TYPE 2 (H): ICD-10-CM

## 2023-11-06 LAB — GAD65 AB SER IA-ACNC: <5 IU/ML

## 2023-11-06 PROCEDURE — 99207 PR NO BILLABLE SERVICE THIS VISIT: CPT | Performed by: NUTRITIONIST

## 2023-11-06 RX ORDER — INSULIN DEGLUDEC 100 U/ML
20 INJECTION, SOLUTION SUBCUTANEOUS DAILY
Qty: 15 ML | Refills: 3 | Status: SHIPPED | OUTPATIENT
Start: 2023-11-06 | End: 2023-11-07

## 2023-11-06 NOTE — TELEPHONE ENCOUNTER
Routing to provider to review and advise.  Would you like the patient to have an appointment?      Kristina Kjellberg, MSN, RN  [facility] Primary Care Triage

## 2023-11-06 NOTE — TELEPHONE ENCOUNTER
Note from [facility] - Biosimilar Drug Substitution   SEMGLEE (YFGN)100U/ML PEN 3ML or LANTUS.  FDA approved interchangeable biosimilar.

## 2023-11-06 NOTE — TELEPHONE ENCOUNTER
Agree with switching to Semglee insulin as suggested by pharmacists.   Semglee does not come as pen. I send prescription of Tresiba  (Degludec). Seems like it is coved by his insurance. Have pharmacy check.

## 2023-11-07 ENCOUNTER — LAB (OUTPATIENT)
Dept: LAB | Facility: CLINIC | Age: 53
End: 2023-11-07
Payer: COMMERCIAL

## 2023-11-07 DIAGNOSIS — D64.9 ANEMIA DUE TO UNKNOWN MECHANISM: ICD-10-CM

## 2023-11-07 DIAGNOSIS — D64.9 ANEMIA, UNSPECIFIED TYPE: ICD-10-CM

## 2023-11-07 DIAGNOSIS — G47.00 PERSISTENT INSOMNIA: ICD-10-CM

## 2023-11-07 LAB
ANION GAP SERPL CALCULATED.3IONS-SCNC: 11 MMOL/L (ref 7–15)
BASOPHILS # BLD AUTO: 0 10E3/UL (ref 0–0.2)
BASOPHILS NFR BLD AUTO: 1 %
BUN SERPL-MCNC: 5.9 MG/DL (ref 6–20)
CALCIUM SERPL-MCNC: 9.5 MG/DL (ref 8.6–10)
CHLORIDE SERPL-SCNC: 93 MMOL/L (ref 98–107)
CREAT SERPL-MCNC: 0.67 MG/DL (ref 0.67–1.17)
DEPRECATED HCO3 PLAS-SCNC: 28 MMOL/L (ref 22–29)
EGFRCR SERPLBLD CKD-EPI 2021: >90 ML/MIN/1.73M2
EOSINOPHIL # BLD AUTO: 0.1 10E3/UL (ref 0–0.7)
EOSINOPHIL NFR BLD AUTO: 2 %
ERYTHROCYTE [DISTWIDTH] IN BLOOD BY AUTOMATED COUNT: 15.1 % (ref 10–15)
GLUCOSE SERPL-MCNC: 352 MG/DL (ref 70–99)
HCT VFR BLD AUTO: 34.7 % (ref 40–53)
HGB BLD-MCNC: 11.4 G/DL (ref 13.3–17.7)
IMM GRANULOCYTES # BLD: 0 10E3/UL
IMM GRANULOCYTES NFR BLD: 0 %
ISLET CELL512 AB SER IA-ACNC: <5.4 U/ML
LYMPHOCYTES # BLD AUTO: 0.8 10E3/UL (ref 0.8–5.3)
LYMPHOCYTES NFR BLD AUTO: 18 %
MCH RBC QN AUTO: 33.7 PG (ref 26.5–33)
MCHC RBC AUTO-ENTMCNC: 32.9 G/DL (ref 31.5–36.5)
MCV RBC AUTO: 103 FL (ref 78–100)
MONOCYTES # BLD AUTO: 0.5 10E3/UL (ref 0–1.3)
MONOCYTES NFR BLD AUTO: 11 %
NEUTROPHILS # BLD AUTO: 3 10E3/UL (ref 1.6–8.3)
NEUTROPHILS NFR BLD AUTO: 68 %
NRBC # BLD AUTO: 0 10E3/UL
NRBC BLD AUTO-RTO: 0 /100
PLATELET # BLD AUTO: 74 10E3/UL (ref 150–450)
POTASSIUM SERPL-SCNC: 4 MMOL/L (ref 3.4–5.3)
RBC # BLD AUTO: 3.38 10E6/UL (ref 4.4–5.9)
SODIUM SERPL-SCNC: 132 MMOL/L (ref 135–145)
WBC # BLD AUTO: 4.4 10E3/UL (ref 4–11)

## 2023-11-07 PROCEDURE — 80048 BASIC METABOLIC PNL TOTAL CA: CPT

## 2023-11-07 PROCEDURE — 85025 COMPLETE CBC W/AUTO DIFF WBC: CPT

## 2023-11-07 PROCEDURE — 36415 COLL VENOUS BLD VENIPUNCTURE: CPT

## 2023-11-07 RX ORDER — BLOOD-GLUCOSE SENSOR
1 EACH MISCELLANEOUS
Qty: 2 EACH | Refills: 5 | OUTPATIENT
Start: 2023-11-07 | End: 2023-11-10

## 2023-11-07 RX ORDER — DOXEPIN HYDROCHLORIDE 10 MG/1
10 CAPSULE ORAL AT BEDTIME
Qty: 180 CAPSULE | Refills: 2 | Status: ON HOLD | OUTPATIENT
Start: 2023-11-07 | End: 2024-03-02

## 2023-11-07 RX ORDER — INSULIN GLARGINE 100 [IU]/ML
25 INJECTION, SOLUTION SUBCUTANEOUS AT BEDTIME
Qty: 15 ML | Refills: 3 | Status: SHIPPED | OUTPATIENT
Start: 2023-11-07 | End: 2023-11-28

## 2023-11-07 NOTE — TELEPHONE ENCOUNTER
Sammi at [facility] pharmacy stated that both Semglee and Degludec are covered at no charge.    Patient had already picked up prescription of Semglee. Pharmacy currently has Degludec Rx ready for patient to  at no charge.    Please confirm if patient is to be on both or one over the other of these medications.    BRIDGET Vital  [facility] Primary Care Triage

## 2023-11-07 NOTE — TELEPHONE ENCOUNTER
PRIOR AUTHORIZATION DENIED    Medication: FREESTYLE ZOLTAN 3 SENSOR [facility]  Insurance Company: [employer] - Phone [phone] Fax [phone]  Denial Date: 11/7/2023  Denial Rational: Patient needs to try and fail preferred products which are the Dexcom G6 & G7  Appeal Information:

## 2023-11-07 NOTE — TELEPHONE ENCOUNTER
Freestyle graham 3 sensors denied. Patient needs to try and fail the preferred products which at Dexcom G6 &G7 . Please advise on how you would like to proceed. If appealed please provide medical rational.

## 2023-11-08 ENCOUNTER — MYC MEDICAL ADVICE (OUTPATIENT)
Dept: FAMILY MEDICINE | Facility: CLINIC | Age: 53
End: 2023-11-08
Payer: COMMERCIAL

## 2023-11-08 DIAGNOSIS — R82.998 ORANGE-COLORED URINE: Primary | ICD-10-CM

## 2023-11-08 NOTE — TELEPHONE ENCOUNTER
I called the pharmacy and left a message indicating that Tresiba can be discontinued.  It looks like Semglee is covered by his insurance and so a prescription of Semglee was sent

## 2023-11-08 NOTE — TELEPHONE ENCOUNTER
Please advise if this warrants visit for further discussion.    BRIDGET Vital  [facility] Primary Care Triage

## 2023-11-09 ENCOUNTER — HOSPITAL ENCOUNTER (OUTPATIENT)
Dept: BEHAVIORAL HEALTH | Facility: CLINIC | Age: 53
Discharge: HOME | End: 2023-11-09
Attending: FAMILY MEDICINE | Admitting: FAMILY MEDICINE
Payer: COMMERCIAL

## 2023-11-09 ENCOUNTER — LAB (OUTPATIENT)
Dept: LAB | Facility: CLINIC | Age: 53
End: 2023-11-09
Payer: COMMERCIAL

## 2023-11-09 VITALS — HEIGHT: 70 IN | WEIGHT: 204 LBS | BODY MASS INDEX: 29.2 KG/M2

## 2023-11-09 DIAGNOSIS — R82.998 ORANGE-COLORED URINE: ICD-10-CM

## 2023-11-09 LAB
ALBUMIN UR-MCNC: NEGATIVE MG/DL
APPEARANCE UR: CLEAR
BACTERIA #/AREA URNS HPF: ABNORMAL /HPF
BILIRUB UR QL STRIP: ABNORMAL
COLOR UR AUTO: YELLOW
GLUCOSE UR STRIP-MCNC: >=1000 MG/DL
HGB UR QL STRIP: NEGATIVE
HYALINE CASTS #/AREA URNS LPF: ABNORMAL /LPF
KETONES UR STRIP-MCNC: NEGATIVE MG/DL
LEUKOCYTE ESTERASE UR QL STRIP: NEGATIVE
NITRATE UR QL: NEGATIVE
PH UR STRIP: 6 [PH] (ref 5–7)
RBC #/AREA URNS AUTO: ABNORMAL /HPF
SP GR UR STRIP: <=1.005 (ref 1–1.03)
SQUAMOUS #/AREA URNS AUTO: ABNORMAL /LPF
UROBILINOGEN UR STRIP-ACNC: 2 E.U./DL
WBC #/AREA URNS AUTO: ABNORMAL /HPF

## 2023-11-09 PROCEDURE — H0001 ALCOHOL AND/OR DRUG ASSESS: HCPCS

## 2023-11-09 PROCEDURE — 81001 URINALYSIS AUTO W/SCOPE: CPT

## 2023-11-09 ASSESSMENT — ANXIETY QUESTIONNAIRES
6. BECOMING EASILY ANNOYED OR IRRITABLE: MORE THAN HALF THE DAYS
3. WORRYING TOO MUCH ABOUT DIFFERENT THINGS: SEVERAL DAYS
5. BEING SO RESTLESS THAT IT IS HARD TO SIT STILL: NOT AT ALL
1. FEELING NERVOUS, ANXIOUS, OR ON EDGE: NOT AT ALL
GAD7 TOTAL SCORE: 6
GAD7 TOTAL SCORE: 6
7. FEELING AFRAID AS IF SOMETHING AWFUL MIGHT HAPPEN: SEVERAL DAYS
2. NOT BEING ABLE TO STOP OR CONTROL WORRYING: SEVERAL DAYS
4. TROUBLE RELAXING: SEVERAL DAYS

## 2023-11-09 ASSESSMENT — COLUMBIA-SUICIDE SEVERITY RATING SCALE - C-SSRS
1. HAVE YOU WISHED YOU WERE DEAD OR WISHED YOU COULD GO TO SLEEP AND NOT WAKE UP?: NO
TOTAL  NUMBER OF ABORTED OR SELF INTERRUPTED ATTEMPTS LIFETIME: NO
ATTEMPT LIFETIME: NO
6. HAVE YOU EVER DONE ANYTHING, STARTED TO DO ANYTHING, OR PREPARED TO DO ANYTHING TO END YOUR LIFE?: NO
2. HAVE YOU ACTUALLY HAD ANY THOUGHTS OF KILLING YOURSELF?: NO
TOTAL  NUMBER OF INTERRUPTED ATTEMPTS LIFETIME: NO

## 2023-11-09 ASSESSMENT — PAIN SCALES - GENERAL: PAINLEVEL: MODERATE PAIN (4)

## 2023-11-09 ASSESSMENT — PATIENT HEALTH QUESTIONNAIRE - PHQ9: SUM OF ALL RESPONSES TO PHQ QUESTIONS 1-9: 10
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
## 2023-11-10 ENCOUNTER — VIRTUAL VISIT (OUTPATIENT)
Dept: EDUCATION SERVICES | Facility: CLINIC | Age: 53
End: 2023-11-10
Payer: COMMERCIAL

## 2023-11-10 DIAGNOSIS — E13.9 LADA (LATENT AUTOIMMUNE DIABETES IN ADULTS), MANAGED AS TYPE 2 (H): ICD-10-CM

## 2023-11-10 PROCEDURE — G0108 DIAB MANAGE TRN  PER INDIV: HCPCS | Mod: VID | Performed by: NUTRITIONIST

## 2023-11-10 RX ORDER — BLOOD-GLUCOSE SENSOR
1 EACH MISCELLANEOUS
Qty: 2 EACH | Refills: 5 | OUTPATIENT
Start: 2023-11-10 | End: 2023-11-13

## 2023-11-10 NOTE — TELEPHONE ENCOUNTER
PA was denied for Quinyx AB Malia 3. New orders where sent again today. Did you want to Appeal or change to preferred Dexcom?

## 2023-11-10 NOTE — PROGRESS NOTES
Diabetes Self-Management Education & Support    Mary Lopez presents today for education related to diabetes.  Patient is being treated with:  insulin  He is accompanied by spouse - Adina     Year of diagnosis: 2023 new diagnosis  Referring provider:  Dr. Hoyt  Living Situation: with Spouse  Employment: not asked     ASSESSMENT:    Taking Medication:     Current Diabetes Management per Patient:  Taking diabetes medications? yes:     Diabetes Medication(s)       Biguanides       metFORMIN (GLUCOPHAGE XR) 500 MG 24 hr tablet    Take 1 tablet (500 mg) by mouth daily (with dinner)      Insulin       insulin glargine (SEMGLEE) 100 UNIT/ML pen    Inject 25 Units Subcutaneous at bedtime            Monitoring    Patient's most recent   Lab Results   Component Value Date    A1C 8.1 10/31/2023      Patient's A1C goal: <8.0    Problem Solving:      Patient is at risk of hypoglycemia?: YES  Hospitalizations for hyper or hypoglycemia: No    EDUCATION and INSTRUCTION PROVIDED AT THIS VISIT:       Patient seen today for follow up. Referred by his PCP Dr. Hoyt. He has had new labs come back that indicate his diabetes is likely not type 1 per Dr. Hoyt.  He is currently Semglee 25 units at 9pm.     They have not been able to get the graham 3 sensor yet.     Glucose readings:     Friday: 466, 491 1.5 hours after eating  Saturday: 381 fasting, 527 after eating  Sunday 287 fasting, 507 one hour after eating  Monday 239 after drinking some boost         Started 25 units of Semglee on Monday night  Tuesday 7th 380 fasting, 378 3 hours after eating - may have eaten in the middle of the night  Wed 8th 363 8:30am unsure if fasting, evening glucose 332  Thurs 9th 330 8am fasting, 522 8pm 1.5 hours after eating (Steak fajitas - 1 shell, cheese, peppers, onions, sour cream, lettuce. Milk to drink 32 ounces.    Today 333 fasting     Sometimes eats in the middle of the night but reports has cut back on that.     Patient is continuing  to avoid sweetened beverages.     Patient-stated goal written and given to Mary Lopez.  Verbalized and demonstrated understanding of instructions.     PLAN:  Check glucose twice daily. Fasting and before dinner. Goal eventually for that time is .  Order placed for graham 3 sensors.   Continue to limit sweets. Completely avoid regular soda and juice (unless treating a low glucose).  Increase Semglee to 28 units    Today's glucose sent to Dr. Hoyt with an update about our visit and follow up plan.     [email or web address]  [phone]    FOLLOW-UP:      11/16 for glucose review and to start sensor    Time spent with patient at today's visit was 45 minutes.      Start 8:30am  End 9:15am    Any diabetes medication dose changes were made via the CDE Protocol and Collaborative Practice Agreement with [place] and  Vishal.  A copy of this encounter was provided to patient's referring provider.

## 2023-11-10 NOTE — PROGRESS NOTES
Is the patient currently in the state of [place]? YES    Visit mode:VIDEO    If the visit is dropped, the patient can be reconnected by: VIDEO VISIT: Text to cell phone:   Telephone Information:   Mobile [phone]       Will anyone else be joining the visit? Yes. Spouse   (If patient encounters technical issues they should call [phone])    How would you like to obtain your AVS? MyChart    Are changes needed to the allergy or medication list? Spouse reported no changes to allergies/medications as patient just had appointment yesterday. Patient was present for rooming as well.    Reason for visit: RECHECK (Follow up )    Autumn CHRISTY

## 2023-11-13 RX ORDER — ACYCLOVIR 400 MG/1
TABLET ORAL
Qty: 3 EACH | Refills: 5 | Status: SHIPPED | OUTPATIENT
Start: 2023-11-13 | End: 2023-11-16

## 2023-11-14 ENCOUNTER — TELEPHONE (OUTPATIENT)
Dept: FAMILY MEDICINE | Facility: CLINIC | Age: 53
End: 2023-11-14

## 2023-11-14 ENCOUNTER — LAB (OUTPATIENT)
Dept: LAB | Facility: CLINIC | Age: 53
End: 2023-11-14
Payer: COMMERCIAL

## 2023-11-14 DIAGNOSIS — K76.6 PORTAL HYPERTENSION (H): ICD-10-CM

## 2023-11-14 DIAGNOSIS — D64.9 ANEMIA, UNSPECIFIED TYPE: ICD-10-CM

## 2023-11-14 DIAGNOSIS — K70.31 ALCOHOLIC CIRRHOSIS OF LIVER WITH ASCITES (H): ICD-10-CM

## 2023-11-14 DIAGNOSIS — D64.9 ANEMIA DUE TO UNKNOWN MECHANISM: ICD-10-CM

## 2023-11-14 LAB
AFP SERPL-MCNC: 4 NG/ML
ANION GAP SERPL CALCULATED.3IONS-SCNC: 13 MMOL/L (ref 7–15)
BASOPHILS # BLD AUTO: 0.1 10E3/UL (ref 0–0.2)
BASOPHILS NFR BLD AUTO: 1 %
BUN SERPL-MCNC: 6.8 MG/DL (ref 6–20)
CALCIUM SERPL-MCNC: 8.9 MG/DL (ref 8.6–10)
CHLORIDE SERPL-SCNC: 90 MMOL/L (ref 98–107)
CREAT SERPL-MCNC: 0.65 MG/DL (ref 0.67–1.17)
DEPRECATED HCO3 PLAS-SCNC: 25 MMOL/L (ref 22–29)
EGFRCR SERPLBLD CKD-EPI 2021: >90 ML/MIN/1.73M2
EOSINOPHIL # BLD AUTO: 0.1 10E3/UL (ref 0–0.7)
EOSINOPHIL NFR BLD AUTO: 2 %
ERYTHROCYTE [DISTWIDTH] IN BLOOD BY AUTOMATED COUNT: 15 % (ref 10–15)
GLUCOSE SERPL-MCNC: 620 MG/DL (ref 70–99)
HCT VFR BLD AUTO: 34.2 % (ref 40–53)
HGB BLD-MCNC: 11.1 G/DL (ref 13.3–17.7)
IMM GRANULOCYTES # BLD: 0 10E3/UL
IMM GRANULOCYTES NFR BLD: 0 %
INR PPP: 2.29 (ref 0.85–1.15)
LYMPHOCYTES # BLD AUTO: 0.9 10E3/UL (ref 0.8–5.3)
LYMPHOCYTES NFR BLD AUTO: 19 %
MCH RBC QN AUTO: 33 PG (ref 26.5–33)
MCHC RBC AUTO-ENTMCNC: 32.5 G/DL (ref 31.5–36.5)
MCV RBC AUTO: 102 FL (ref 78–100)
MONOCYTES # BLD AUTO: 0.7 10E3/UL (ref 0–1.3)
MONOCYTES NFR BLD AUTO: 14 %
NEUTROPHILS # BLD AUTO: 2.8 10E3/UL (ref 1.6–8.3)
NEUTROPHILS NFR BLD AUTO: 64 %
NRBC # BLD AUTO: 0 10E3/UL
NRBC BLD AUTO-RTO: 0 /100
PLATELET # BLD AUTO: 76 10E3/UL (ref 150–450)
POTASSIUM SERPL-SCNC: 3.8 MMOL/L (ref 3.4–5.3)
RBC # BLD AUTO: 3.36 10E6/UL (ref 4.4–5.9)
SODIUM SERPL-SCNC: 128 MMOL/L (ref 135–145)
VIT D+METAB SERPL-MCNC: 32 NG/ML (ref 20–50)
WBC # BLD AUTO: 4.6 10E3/UL (ref 4–11)

## 2023-11-14 PROCEDURE — 85610 PROTHROMBIN TIME: CPT

## 2023-11-14 PROCEDURE — 36415 COLL VENOUS BLD VENIPUNCTURE: CPT

## 2023-11-14 PROCEDURE — 80048 BASIC METABOLIC PNL TOTAL CA: CPT

## 2023-11-14 PROCEDURE — 82105 ALPHA-FETOPROTEIN SERUM: CPT

## 2023-11-14 PROCEDURE — 82306 VITAMIN D 25 HYDROXY: CPT

## 2023-11-14 PROCEDURE — G0480 DRUG TEST DEF 1-7 CLASSES: HCPCS | Mod: 90

## 2023-11-14 PROCEDURE — 85025 COMPLETE CBC W/AUTO DIFF WBC: CPT

## 2023-11-14 NOTE — TELEPHONE ENCOUNTER
Called and spoke with patient and wife, Rosio. Consent to communicate on file to speak Rosio.    Notified patient of critical lab result and provider's response. Patient reports that he is currently not experiencing any symptoms. Is feeling normal and not feeling any different. Reports that he does not want to be seen today. Offered patient to be seen at [facility] today if they approve patient to be seen there. Patient repeatedly stated that he did not want to be seen today because he felt fine. RN advised patient multiple times that she does not recommend this and advises that patient is seen today. Advised patient and Rosio to have patient seen in the ER if he starts to develop symptoms such as lightheadedness, blurred vision, extreme thirst/dry mouth, confusion, tiredness/weakness, nausea/vomiting, etc. They verbalized understanding and had no further questions/concerns at this time.     Routing to PCP in case he has any further recommendations for patient at this time.     Celsa Jiménez, EDDAN, RN   [facility]

## 2023-11-14 NOTE — TELEPHONE ENCOUNTER
Betsy called from [facility] lab to report a critical lab.     Reported glucose level of 620. Repeated lab back a few times.     Routing to provider to review and advise.     Celsa Jiménez, EDDAN, RN   [facility]

## 2023-11-15 DIAGNOSIS — M62.830 BACK MUSCLE SPASM: ICD-10-CM

## 2023-11-15 RX ORDER — CYCLOBENZAPRINE HCL 10 MG
10 TABLET ORAL 3 TIMES DAILY PRN
Qty: 30 TABLET | Refills: 1 | Status: SHIPPED | OUTPATIENT
Start: 2023-11-15 | End: 2024-02-06

## 2023-11-15 NOTE — TELEPHONE ENCOUNTER
I talked to the patient's wife yesterday regarding the elevated blood sugar.  He will increase the dose of Lantus insulin to 35 units daily.  Continue to work with diabetic educator and readjusting the dose of insulin.  He may require addition of short acting insulin.  He is getting continuous glucose monitor and so we will have a better idea on what to do next for him.

## 2023-11-16 ENCOUNTER — VIRTUAL VISIT (OUTPATIENT)
Dept: EDUCATION SERVICES | Facility: CLINIC | Age: 53
End: 2023-11-16
Payer: COMMERCIAL

## 2023-11-16 DIAGNOSIS — E11.9 DIABETES MELLITUS (H): Primary | ICD-10-CM

## 2023-11-16 DIAGNOSIS — E13.9 LADA (LATENT AUTOIMMUNE DIABETES IN ADULTS), MANAGED AS TYPE 2 (H): Primary | ICD-10-CM

## 2023-11-16 DIAGNOSIS — E13.9 LADA (LATENT AUTOIMMUNE DIABETES IN ADULTS), MANAGED AS TYPE 2 (H): ICD-10-CM

## 2023-11-16 DIAGNOSIS — Z79.4 TYPE 2 DIABETES MELLITUS WITH HYPERGLYCEMIA, WITH LONG-TERM CURRENT USE OF INSULIN (H): ICD-10-CM

## 2023-11-16 DIAGNOSIS — E11.65 TYPE 2 DIABETES MELLITUS WITH HYPERGLYCEMIA, WITH LONG-TERM CURRENT USE OF INSULIN (H): ICD-10-CM

## 2023-11-16 PROCEDURE — 99207 PR NO BILLABLE SERVICE THIS VISIT: CPT | Mod: VID | Performed by: NUTRITIONIST

## 2023-11-16 RX ORDER — INSULIN ASPART 100 [IU]/ML
5 INJECTION, SOLUTION INTRAVENOUS; SUBCUTANEOUS
Qty: 15 ML | Refills: 3 | Status: SHIPPED | OUTPATIENT
Start: 2023-11-16 | End: 2023-11-28

## 2023-11-16 RX ORDER — ACYCLOVIR 400 MG/1
TABLET ORAL
Qty: 3 EACH | Refills: 5 | Status: SHIPPED | OUTPATIENT
Start: 2023-11-16 | End: 2024-08-27

## 2023-11-16 NOTE — PROGRESS NOTES
Diabetes Self-Management Education & Support    Mary Lopez presents today for education related to diabetes.  Patient is being treated with:  insulin  He is accompanied by spouse - Adina     Year of diagnosis: 2023 new diagnosis  Referring provider:  Dr. Hoyt  Living Situation: with Spouse  Employment: not asked     ASSESSMENT:    Taking Medication:     Current Diabetes Management per Patient:  Taking diabetes medications? yes:     Diabetes Medication(s)       Biguanides       metFORMIN (GLUCOPHAGE XR) 500 MG 24 hr tablet    Take 1 tablet (500 mg) by mouth daily (with dinner)      Insulin       insulin glargine (SEMGLEE) 100 UNIT/ML pen    Inject 25 Units Subcutaneous at bedtime            Monitoring    Patient's most recent   Lab Results   Component Value Date    A1C 8.1 10/31/2023      Patient's A1C goal: <8.0    Problem Solving:      Patient is at risk of hypoglycemia?: YES  Hospitalizations for hyper or hypoglycemia: No    EDUCATION and INSTRUCTION PROVIDED AT THIS VISIT:       Patient seen today for follow up. Referred by his PCP Dr. Hoyt. He has had new labs come back that indicate his diabetes is likely not type 1 per Dr. Hoyt.  He is currently Semglee 35 units at 9pm. This was increased on Tuesday by Dr. Hoyt     They started the dexcom G7 Report below:              Patient is continuing to avoid sweetened beverages.     We reviewed what foods have carbs, discussed recommended portions to help with consistent carb diet.  Discussed function and injection technique for rapid acting insulin. Discussed fixed dose versus insulin to carb ratio. They think fixed dose is easier.   Discussed would not take rapid acting insulin if NOT eating carb at a meal.   Mary usually eats two meals daily.     Patient-stated goal written and given to Mary Lopez.  Verbalized and demonstrated understanding of instructions.     PLAN:  Will discuss starting rapid acting insulin with Dr. Hoyt.      [email or web address]  [phone]    FOLLOW-UP:      Start 7:32am  End 8:01am  Time spent 28 minutes    Any diabetes medication dose changes were made via the CDE Protocol and Collaborative Practice Agreement with [facility] and  [facility].  A copy of this encounter was provided to patient's referring provider.

## 2023-11-16 NOTE — NURSING NOTE
Is the patient currently in the state of [place]? YES    Visit mode:VIDEO    If the visit is dropped, the patient can be reconnected by: VIDEO VISIT: Text to cell phone:   Telephone Information:   Mobile [phone]       Will anyone else be joining the visit? NO  (If patient encounters technical issues they should call [phone])    How would you like to obtain your AVS? MyChart    Are changes needed to the allergy or medication list? Pt stated no changes to allergies and Pt stated no med changes    Reason for visit: JOELLE Hannon, RANDI, VVF

## 2023-11-17 ENCOUNTER — TELEPHONE (OUTPATIENT)
Dept: FAMILY MEDICINE | Facility: CLINIC | Age: 53
End: 2023-11-17

## 2023-11-17 LAB
LABORATORY REPORT: NORMAL
PETH INTERPRETATION: NORMAL
PLPETH BLD-MCNC: <10 NG/ML
POPETH BLD-MCNC: <10 NG/ML

## 2023-11-17 NOTE — TELEPHONE ENCOUNTER
Prior Authorization Retail Medication Request    Medication/Dose: insulin aspart (NOVOLOG FLEXPEN) 100 UNIT/ML pen  Diagnosis and ICD code (if different than what is on RX):    New/renewal/insurance change PA/secondary ins. 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

## 2023-11-20 ENCOUNTER — TELEPHONE (OUTPATIENT)
Dept: EDUCATION SERVICES | Facility: CLINIC | Age: 53
End: 2023-11-20
Payer: COMMERCIAL

## 2023-11-20 ENCOUNTER — PATIENT OUTREACH (OUTPATIENT)
Dept: CARE COORDINATION | Facility: CLINIC | Age: 53
End: 2023-11-20

## 2023-11-20 ENCOUNTER — MYC MEDICAL ADVICE (OUTPATIENT)
Dept: EDUCATION SERVICES | Facility: CLINIC | Age: 53
End: 2023-11-20

## 2023-11-20 DIAGNOSIS — E11.9 DIABETES MELLITUS (H): Primary | ICD-10-CM

## 2023-11-20 PROCEDURE — 99207 PR NO BILLABLE SERVICE THIS VISIT: CPT | Performed by: NUTRITIONIST

## 2023-11-20 NOTE — PROGRESS NOTES
Clinic Care Coordination Contact  [facility]/Voicemail    Clinical Data: Care Coordinator Outreach    Outreach Documentation Number of Outreach Attempt   11/20/2023   4:10 PM 1       Left message on patient's voicemail with call back information and requested return call.    Plan: Care Coordinator will try to reach patient again in 1-2 business days.    NEAL Hernández  Clinic Care Coordination  [facility]  [email or web address]  [phone]

## 2023-11-20 NOTE — TELEPHONE ENCOUNTER
Form completed please send as requested   
Forms completed and sent to pt.  Kiera Aguilera CMA    
20-Nov-2023 13:29

## 2023-11-20 NOTE — TELEPHONE ENCOUNTER
Spoke with Adina regarding novolog order per Dr. Hoyt. Patient and Adina will  and start today. He is scheduled with PA in one week. Scheduled with me in three weeks.

## 2023-11-21 ENCOUNTER — MYC MEDICAL ADVICE (OUTPATIENT)
Dept: FAMILY MEDICINE | Facility: CLINIC | Age: 53
End: 2023-11-21

## 2023-11-21 ENCOUNTER — LAB (OUTPATIENT)
Dept: LAB | Facility: CLINIC | Age: 53
End: 2023-11-21
Payer: COMMERCIAL

## 2023-11-21 ENCOUNTER — TELEPHONE (OUTPATIENT)
Dept: FAMILY MEDICINE | Facility: CLINIC | Age: 53
End: 2023-11-21

## 2023-11-21 DIAGNOSIS — D64.9 ANEMIA, UNSPECIFIED TYPE: ICD-10-CM

## 2023-11-21 DIAGNOSIS — D64.9 ANEMIA DUE TO UNKNOWN MECHANISM: ICD-10-CM

## 2023-11-21 LAB
ANION GAP SERPL CALCULATED.3IONS-SCNC: 13 MMOL/L (ref 7–15)
BASOPHILS # BLD AUTO: 0.1 10E3/UL (ref 0–0.2)
BASOPHILS NFR BLD AUTO: 1 %
BUN SERPL-MCNC: 8.3 MG/DL (ref 6–20)
CALCIUM SERPL-MCNC: 9.3 MG/DL (ref 8.6–10)
CHLORIDE SERPL-SCNC: 88 MMOL/L (ref 98–107)
CREAT SERPL-MCNC: 0.68 MG/DL (ref 0.67–1.17)
DEPRECATED HCO3 PLAS-SCNC: 26 MMOL/L (ref 22–29)
EGFRCR SERPLBLD CKD-EPI 2021: >90 ML/MIN/1.73M2
EOSINOPHIL # BLD AUTO: 0.2 10E3/UL (ref 0–0.7)
EOSINOPHIL NFR BLD AUTO: 4 %
ERYTHROCYTE [DISTWIDTH] IN BLOOD BY AUTOMATED COUNT: 14.7 % (ref 10–15)
GLUCOSE SERPL-MCNC: 610 MG/DL (ref 70–99)
HCT VFR BLD AUTO: 35.1 % (ref 40–53)
HGB BLD-MCNC: 11.5 G/DL (ref 13.3–17.7)
IMM GRANULOCYTES # BLD: 0 10E3/UL
IMM GRANULOCYTES NFR BLD: 0 %
LYMPHOCYTES # BLD AUTO: 1.2 10E3/UL (ref 0.8–5.3)
LYMPHOCYTES NFR BLD AUTO: 23 %
MCH RBC QN AUTO: 32.9 PG (ref 26.5–33)
MCHC RBC AUTO-ENTMCNC: 32.8 G/DL (ref 31.5–36.5)
MCV RBC AUTO: 100 FL (ref 78–100)
MONOCYTES # BLD AUTO: 0.6 10E3/UL (ref 0–1.3)
MONOCYTES NFR BLD AUTO: 12 %
NEUTROPHILS # BLD AUTO: 3.2 10E3/UL (ref 1.6–8.3)
NEUTROPHILS NFR BLD AUTO: 60 %
NRBC # BLD AUTO: 0 10E3/UL
NRBC BLD AUTO-RTO: 0 /100
PLATELET # BLD AUTO: 81 10E3/UL (ref 150–450)
POTASSIUM SERPL-SCNC: 4 MMOL/L (ref 3.4–5.3)
RBC # BLD AUTO: 3.5 10E6/UL (ref 4.4–5.9)
SODIUM SERPL-SCNC: 127 MMOL/L (ref 135–145)
WBC # BLD AUTO: 5.3 10E3/UL (ref 4–11)

## 2023-11-21 PROCEDURE — 36415 COLL VENOUS BLD VENIPUNCTURE: CPT

## 2023-11-21 PROCEDURE — 80048 BASIC METABOLIC PNL TOTAL CA: CPT

## 2023-11-21 PROCEDURE — 85025 COMPLETE CBC W/AUTO DIFF WBC: CPT

## 2023-11-21 RX ORDER — INSULIN LISPRO 100 [IU]/ML
5 INJECTION, SOLUTION INTRAVENOUS; SUBCUTANEOUS
Qty: 15 ML | Refills: 5 | Status: SHIPPED | OUTPATIENT
Start: 2023-11-21 | End: 2023-11-28

## 2023-11-21 NOTE — TELEPHONE ENCOUNTER
The blood sugar of 610 noted.  I will be connecting with the patient and adjusting insulin further.

## 2023-11-21 NOTE — PROGRESS NOTES
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     He has been admitted to the hospital 3 times since June of this year twice for decompensated liver disease and once for GI bleed.  He also did have an inflamed scrotum.    He is here to establish care for treatment of his diabetes.      HPI:    Brianda presents to clinic with his wife Adina.  They report that the diabetes came on very quickly about 6 weeks ago.  He went from having pretty regular blood sugars to having a blood sugar over 500 and lab work that was done for something else.  It was thought that he might have type 1 diabetes, but they understand from meeting with Liana Garcia that that is not the case.  He was started on metformin 500 mg in late October soon after he was started on Semglee.  More recently he has started taking 10 units of Humalog with meals.  They only can guess that it might be related to his liver disease  because nothing else has really changed.  He did have negative screens for diabetes in both March and August of this year with normal A1c's.    He does have a brother and  his father with type 2 diabetes.  He has no known relatives with type 1 diabetes.  Also regards to personal and family history he has no known history of pancreatitis and no personal or family history of pancreatic cancer or thyroid cancer or multiple endocrine neoplasia's.  To his knowledge she has never had abnormal imaging of his pancreas, nor is anyone suspected he have pancreatitis.      His appetite has been poor and his wife been has been happy if he eats anything.  He is trying to consolidate his grazing into meals and will have 1-2 meals per day.    His wife notes that on occasion the Dexcom appears to go blank and will have a low reading as it did this morning around 90 but then when he picked back up the blood sugar was again 200, it was 400 and had dropped off.  They deny giving any insulin prior to this.  The last insulin dose was the Semglee 35 units that they gave last night.    At the end of the visit Akiko asks if I can recommend marijuana Gummies for insomnia.  He has seen sleep clinic and tried melatonin previously.    Current Diabetes Medications:    Semglee 35 units daily.  Humalog 10 units with each meal -today he ate about half hour ago, yesterday he did eat twice and thus had 20 units Humalog, the day prior he ate just once and had 10 units of Humalog.  Metformin 500 mg daily with supper.    We reviewed glucometer/CGMS data together.  It revealed:  Average blood sugar of 389 with 100% of blood sugar above range.    I do note however there are some points when the blood sugar comes now down near the 180 storm.  There is a noted recording of the 90 but it does appear Dexcom had cut off during this point in time.      Please see details below.  I did also look online and indeed it appears this morning there was interruption in the connection and Dexcom reading.  I do not believe that his blood sugar was ever as low as 70, but rather this was related to signal loss.            Mary's PMH, PSH and  "allergies were reviewed today and pertinent information is summarized above.    Mary's pertinent social and family history are also reviewed today and pertinent information is summarized above.      Current Outpatient Medications   Medication    blood glucose (NO BRAND SPECIFIED) test strip    blood glucose monitoring (NO BRAND SPECIFIED) meter device kit    Continuous Blood Gluc Sensor (DEXCOM G7 SENSOR) MISC    cyclobenzaprine (FLEXERIL) 10 MG tablet    doxepin (SINEQUAN) 10 MG capsule    ferrous sulfate (FEROSUL) 325 (65 Fe) MG tablet    insulin aspart (NOVOLOG FLEXPEN) 100 UNIT/ML pen    insulin glargine (SEMGLEE) 100 UNIT/ML pen    insulin lispro (HUMALOG KWIKPEN) 100 UNIT/ML (1 unit dial) KWIKPEN    lactulose (CHRONULAC) 10 GM/15ML solution    melatonin 10 MG TABS tablet    metFORMIN (GLUCOPHAGE XR) 500 MG 24 hr tablet    multivitamin w/minerals (THERA-VIT-M) tablet    omeprazole (PRILOSEC) 20 MG DR capsule    potassium chloride ER (KLOR-CON M) 10 MEQ CR tablet    thin (NO BRAND SPECIFIED) lancets    torsemide (DEMADEX) 10 MG tablet    zinc oxide (DESITIN) 20 % external ointment     No current facility-administered medications for this visit.         ROS:   Reports good physical activity tolerance.  Denies any pedal lesions or vision changes or concerns. Denies any other acute concerns except as noted above.      Exam:    Ht 1.778 m (5' 10\")   Wt 95.3 kg (210 lb)   BMI 30.13 kg/m      General: Pleasant, well nourished and hydrated male in NAD.  Seated comfortably at home with his wife.  His wife provides the majority of the history.    Psych:  Mood is \"good,\" affect is appropriate.  Thought form and content are fluid and coherent.    HEENT: Eyes and sclera are clear. Extraocular movements are grossly intact without proptosis.  Nares are patent, mucous membranes moist.  Neck: No masses or JVD are noted.    Resp: Easy and unlabored breathing.   Neuro: Alert.  He is able to answer some of my questions.    "   Data:      Recent Labs   Lab Test 11/28/23  1013 11/21/23  1534 11/07/23  1030 10/31/23  1137 10/03/23  1649 09/26/23  1701 08/29/23  1018 08/23/23  1018 09/03/22  1006 03/12/22  1007 03/12/22  1006 12/04/21  0945   0000   A1C  --   --   --  8.1*  --   --   --  5.1  --  5.7*  --   --    < >   TSH  --   --   --   --   --   --   --   --   --   --  2.18  --   --    LDL  --   --   --   --   --   --   --   --   --   --  102* 120*  --    HDL  --   --   --   --   --   --   --   --   --   --  71 76  --    TRIG  --   --   --   --   --   --   --   --   --   --  151* 174*  --    CR 0.62* 0.68   < > 0.79   < > 0.80   < >  --    < >  --  0.70 1.43*  --    MICROL  --   --   --   --   --   --   --   --   --  45  --   --   --    AST 81*  --   --   --   --  73*   < >  --    < >  --  186*  --   --    ALT 42  --   --   --   --  39   < >  --    < >  --  93*  --   --     < > = values in this interval not displayed.           Most recent GFRs:    Lab Results   Component Value Date    GFRESTIMATED >90 11/28/2023    GFRESTIMATED >90 11/21/2023    GFRESTIMATED >90 11/14/2023    GFRESTBLACK >90 07/05/2020    GFRESTBLACK >80 11/29/2005       Lab Results   Component Value Date    CPEPT 3.7 10/31/2023    GADAB <5.0 11/03/2023     His blood sugar at the time of the C-peptide drawn was 761.  This likely does represent insulin insufficiency.    FIB-4 Calculation: 8.02 at 11/28/2023 10:13 AM  Calculated from:  SGOT/AST: 81 U/L at 11/28/2023 10:13 AM  SGPT/ALT: 42 U/L at 11/28/2023 10:13 AM  Platelets: 81 10e3/uL at 11/28/2023 10:13 AM  Age: 53 years  AST   Date Value Ref Range Status   11/28/2023 81 (H) 0 - 45 U/L Final     Comment:     Reference intervals for this test were updated on 6/12/2023 to more accurately reflect our healthy population. There may be differences in the flagging of prior results with similar values performed with this method. Interpretation of those prior results can be made in the context of the updated reference  intervals.   12/19/2006 52 0 - 55 U/L Final     Lab Results   Component Value Date    ALT 42 11/28/2023    ALT 50 12/19/2006     chart review reveals a normal amylase value nearly a decade ago.  He had ultrasound of the abdomen in October 2022 that noted normal-appearing head and body of the pancreas, tail was obscured by gas in the bowel.          Assessment/Plan:    Mary Lopez is a 53 year old male with recently diagnosed uncontrolled type 2 diabetes.  His blood sugars are far above goal with an average blood sugar of 386, and blood sugars predominantly in the greater than 400 range all day every day.  He has been having difficulties with hepatic cirrhosis and has a poor appetite.  Because of the sudden onset of diabetes is unclear.  He is having pancreatic imaging in the coming weeks and that may be revealing.  Otherwise I do think I will recheck his C-peptide and glucose possibly insulin antibodies again in a couple of months.      1.  Type 2 diabetes versus more likely insulin insufficiency:   We will work to bring his blood sugars into goal ranges soon as possible, but safely and avoiding hypoglycemia and is this point will primarily use insulin.  He would be an excellent candidate for GLP-1 agonist but will have pancreatic imaging in the next couple of weeks and I want to make sure that that is normal.  I am also concerned about further limitation of his appetite.  His blood sugars at this point are too high to use SGLT2 inhibitor without concern for infection, I also am concerned that he is insulin deficient and this could lead to DKA so I strongly recommended against the use of SGLT2 inhibitor until the cause of his diabetes is more clear.    Today I am advising him to increase his Semglee to 40 units daily, and further advising that they increase this dose by 1 unit each day until they are able to see a fasting blood sugar under 150 as long as this does not lead to any low blood sugars.   Furthermore we discussed the use of correction insulin.  I will have him begin using a scale of 1 per 35 greater than 175 during the day, and greater than 210 at bedtime.  Again they will meet with Liana in the next couple of weeks to review this and possibly increase insulin doses.     Will need to continue to monitor him closely as well he currently is insulin-dependent, it seems likely that this will continue to increase.        2. DM Complications-     Again this was recently diagnosed within the last month and he does have normal A1c's earlier this year so is unlikely to have diabetes complications.  Unfortunately our appointment today was scheduled for only 30 minutes.      71 minutes spent on the date of the encounter doing chart review, history and exam, documentation, education and counseling, as well as communication and coordination of care, and further activities as noted above.  This time includes time spent reviewing CGM.    Addendum: Following requests by Liana Lakia who is seeing him in diabetes education for ongoing hyperglycemia I reviewed his abdominal imaging from December as well as his hemochromatosis testing.  He does indeed have iron deposits in his diabetes andis a heterozygote for hemochromatosis, thus his clinical situation is consistent with bronze diabetes.  No literature to support use of GLP or GLP-1 agonist in the situation I surmise that it could make the situation worse.  We will need to treat Mr. Chaudhry as an insulin-dependent diabetes if he is open to pump therapy I do think he will benefit from that and am asking Liana to do a prepump assessment with him.  He will do well with automated insulin delivery pump such as tandem control IQ, OmniPod 5, Medtronic 780 G, or potentially beta beprettys.  At this point we need to emphasize that necessity of his giving a bolus with meals, if he continues to be challenged with this My top recommendation would be for the OmniPod 5 as  summation it is the least  reliant on patient announcing meals.    It is my privilege to be involved in the care of the above patient.     Suzanne Todd PA-C, MPAS  [facility]  Diabetes, Endocrinology, and Metabolism  [phone] Appointments/Nurse  [phone] pager  [phone] nurse line    This note was completed in part using Dragon voice recognition, and may contain word and grammatical errors.      Virtual Visit Details    Type of service:  Video Visit     Joined the call at 11/28/2023, 12:07:58 pm.  Left the call at 11/28/2023, 12:37:16 pm.  You were on the call for 29 minutes 17 seconds .    Originating Location (pt. Location): Home  Distant Location (provider location):  Off-site  Platform used for Video Visit: Yenifer    Outcome for 11/21/23 8:41 AM: Data uploaded on Dexcom  Feli Hannon MA  Outcome for 11/24/23 1:55 PM: Data obtained via Dexcom website  Feli Hannon MA

## 2023-11-21 NOTE — TELEPHONE ENCOUNTER
PA Initiation    Medication: NOVOLOG FLEXPEN 100 UNIT/ML SC SOPN  Insurance Company: [employer] - Phone [phone] Fax [phone]  Pharmacy Filling the Rx: [id number] - [place], [place] - [address] AT [facility]  Filling Pharmacy Phone: [phone]  Filling Pharmacy Fax:    Start Date: 11/21/2023

## 2023-11-21 NOTE — TELEPHONE ENCOUNTER
[facility], calling with critical glucose result as stated below.      Glucose: 610    Routing to provider.     Cassie Barrientos RN

## 2023-11-21 NOTE — TELEPHONE ENCOUNTER
No RN outreach needed, provider to contact pt as stated below.     Encounter closed.     Cassie Barrientos, RN

## 2023-11-22 ENCOUNTER — PATIENT OUTREACH (OUTPATIENT)
Dept: CARE COORDINATION | Facility: CLINIC | Age: 53
End: 2023-11-22

## 2023-11-22 ENCOUNTER — VIRTUAL VISIT (OUTPATIENT)
Dept: GASTROENTEROLOGY | Facility: CLINIC | Age: 53
End: 2023-11-22
Attending: PHYSICIAN ASSISTANT
Payer: COMMERCIAL

## 2023-11-22 VITALS — BODY MASS INDEX: 29.49 KG/M2 | WEIGHT: 206 LBS | HEIGHT: 70 IN

## 2023-11-22 DIAGNOSIS — K70.30 ALCOHOLIC CIRRHOSIS OF LIVER WITHOUT ASCITES (H): Primary | ICD-10-CM

## 2023-11-22 DIAGNOSIS — Z79.4 TYPE 2 DIABETES MELLITUS WITH HYPEROSMOLARITY WITHOUT COMA, WITH LONG-TERM CURRENT USE OF INSULIN (H): ICD-10-CM

## 2023-11-22 DIAGNOSIS — Z14.8 ALPHA-1-ANTITRYPSIN DEFICIENCY CARRIER: ICD-10-CM

## 2023-11-22 DIAGNOSIS — E11.00 TYPE 2 DIABETES MELLITUS WITH HYPEROSMOLARITY WITHOUT COMA, WITH LONG-TERM CURRENT USE OF INSULIN (H): ICD-10-CM

## 2023-11-22 DIAGNOSIS — Z14.8 CARRIER OF HEMOCHROMATOSIS HFE GENE MUTATION: ICD-10-CM

## 2023-11-22 PROCEDURE — 99215 OFFICE O/P EST HI 40 MIN: CPT | Mod: VID | Performed by: PHYSICIAN ASSISTANT

## 2023-11-22 ASSESSMENT — PAIN SCALES - GENERAL: PAINLEVEL: NO PAIN (0)

## 2023-11-22 NOTE — TELEPHONE ENCOUNTER
Routing to provider to review and advise.     Celsa Jiménez, EDDAN, RN   [facility]

## 2023-11-22 NOTE — TELEPHONE ENCOUNTER
Prior Authorization Not Needed per Insurance    Medication: NOVOLOG FLEXPEN 100 UNIT/ML SC SOPN  Insurance Company: [employer] - Phone [phone] Fax [phone]  Expected CoPay: $    Pharmacy Filling the Rx: [id number] - [place], [place] [address]  Pharmacy Notified: Yes  Patient Notified: YEs    Patient switched to Humalog

## 2023-11-22 NOTE — PROGRESS NOTES
Mary is a 53 year old who is being evaluated via a billable video visit.      How would you like to obtain your AVS? MyChart  If the video visit is dropped, the invitation should be resent by: Text to cell phone: [phone]  Will anyone else be joining your video visit? No    Video-Visit Details    Type of service:  Video Visit   Joined the call at 11/22/2023, 11:18:10?am.  Left the call at 11/22/2023, 11:43:53?am.  Originating Location (pt. 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 No alcohol since June 2023. MELD 3.0 in September was 30. No recent hepatic panel, but continues to have elevated INR of 2.29. If he continues to have significant liver dysfunction, discussing moving forward with liver transplant evaluation.     # Assessment of liver function: Hepatic panel, BMP, INR next week     # Repeat IgG (IgG previously 2330, SMA 70), IgG sub-class    # Based on any on-going elevations in transaminases and bilirubin, IgG levels may recommend moving forward with MRI/MRCP (rapid onset DM requiring insulin) vs liver biopsy     # HCC screening, up to date:   - Due in March 2024    # Variceal screening/history of G1 EV/GOV Type1, up to date:   - Repeat July 2024 or sooner as indicated    # Lower extremity edema, managed:   # Pleural effursions, improved:   # Ascites, improved:   -Continue torsemide 10 mg daily   -Continue 20 mEq potassium chloride daily    # Anemia, multi-factorial (haptaglobin low, LDH high), improved, now up to 11.5   - Trend hemoglobin     # Hepatic Encephalopathy, fairly well controlled:   - Continue lactulose daily (adjust dose to 2-3 bowel movements a day)      # Hyponatremia:  - Needs improved blood glucose control, prescribed short acting insulin yesterday   - Continue 2000 mg sodium diet     # DM type 2, CLAY:   - Continue optimization of blood glucose   - High risk for dehydration     # History of alcohol abuse:  - No alcohol since June 25, 2023.  Congratulated on his efforts with maintaining sobriety   - Has completed Rule 25, with last one through [employer] recommending individual behavior   - Continue Absolute sobriety from alcohol    # Follow up with Dr. Longoria in January as previously scheduled     Sher Dutton PA-C   [facility] Hepatology clinic    Total time for E/M services performed on the date of the encounter 45 minutes.  This included review of previous: clinic visits, hospital records, lab results, imaging studies, and procedural documentation. Time also includes patient visit, documentation and discussion with other providers.  The findings from this review are summarized in the above note.   -----------------------------------------------------       HPI:   Mary Lopez is a 53 year old male presenting for follow-up.     ETOH Cirrhosis  Complicated by:  - No history of ascites   - History of GI bleed.   EGD: (7/26/2023) grade 1 esophageal varices, moderate PHG with oozing and type II GOV 2   HCC: US abdomen liver Doppler in September 2023     Patient was last seen by me on 9/27/2023. Was diagnosed with CLAY and started on insulin and has been working with DM Educator. PCP orders short acting insulin yesterday, hasn't started.     Per chart review, weight is down about 50 lb in the last 2-3 months, primarily fluid weight. No fluid restriction lately.     Eating is more sporatic (trying to do two bigger meals). 1/2 sandwich w/ boost     Not much fluid in legs. Legs feel a little heavier. No numbness or tingling in legs.   Will go out to store for shopping. No problems with bloating in abdomen.     Overall confusion//mental fuzziness  improving.     Will intermittently have blood with bowel movements,     Patient also denies melena, or hematemesis.  Patient denies  fevers, sweats or chills.    No CD treatment in the past. Denies cravings. Last drank June 25th, 2023. Negative Peth 7/26/2023. Has contact information to start therapy appointment, not scheduled yet.     Previous work-up:   Lab Results   Component Value Date    AUSAB 2.19 08/17/2023    KE 4,261 (H) 08/23/2023    IRONSAT  08/23/2023      Comment:      Unable to calculate:  UIBC or Iron value is outside detectable level.    TTG 2.2 09/13/2023    TTGG 2.0 09/13/2023     (H) 09/13/2023    SMOOTHMUS 70 (H) 09/13/2023    C5QBYLA Whole Blood 09/13/2023    A1A 97 09/13/2023    N2FUSYT Negative 09/13/2023    C7KPTMC Heterozygous (A) 09/13/2023    S6IHFLCP Not Applicable 09/13/2023    U4LRJKOMK See Note 09/13/2023    MITM2 1.2 08/23/2023    TSH 2.18 03/12/2022    CHOL 203 (H) 03/12/2022    HDL 71 03/12/2022     (H) 03/12/2022    TRIG 151 (H) 03/12/2022    A1C 8.1 (H) 10/31/2023    POPETH <10 11/14/2023    PLPETH <10 11/14/2023      Lab Results   Component Value Date    SPECDES  09/13/2023     Blood: ACD      LDRESULTS  09/13/2023     HEMOCHROMATOSIS RESULTS    HFE Gene C282Y (G845A) RESULTS:    C282Y Mutation Interpretation: HETEROZYGOTE    HFE Gene H63D (C187G) RESULTS:    H63D Mutation Interpretation: NORMAL    HFE Gene S65C (A193T) RESULTS:    S65C Mutation Interpretation: NORMAL             Medications:     Current Outpatient Medications   Medication    blood glucose (NO BRAND SPECIFIED) test strip    blood glucose monitoring (NO BRAND SPECIFIED) meter device kit    Continuous Blood Gluc Sensor (DEXCOM G7 SENSOR) MISC    cyclobenzaprine (FLEXERIL) 10 MG tablet    doxepin (SINEQUAN) 10 MG capsule    ferrous sulfate (FEROSUL) 325 (65 Fe) MG tablet    insulin aspart (NOVOLOG FLEXPEN) 100 UNIT/ML pen    insulin glargine (SEMGLEE) 100 UNIT/ML pen    insulin lispro (HUMALOG  "KWIKPEN) 100 UNIT/ML (1 unit dial) KWIKPEN    lactulose (CHRONULAC) 10 GM/15ML solution    melatonin 10 MG TABS tablet    metFORMIN (GLUCOPHAGE XR) 500 MG 24 hr tablet    multivitamin w/minerals (THERA-VIT-M) tablet    omeprazole (PRILOSEC) 20 MG DR capsule    potassium chloride ER (KLOR-CON M) 10 MEQ CR tablet    thin (NO BRAND SPECIFIED) lancets    torsemide (DEMADEX) 10 MG tablet    zinc oxide (DESITIN) 20 % external ointment     No current facility-administered medications for this visit.            Review of Systems:   10 points ROS was obtained and highlighted in the HPI, otherwise negative.          Physical Exam:   VS:  Ht 1.778 m (5' 10\")   Wt 93.4 kg (206 lb)   BMI 29.56 kg/m        GENERAL: alert and no distress, some jaundice  EYES: Eyes grossly normal to inspection, icteric sclera  RESP: no audible wheeze, cough, or visible cyanosis.  No visible retractions or increased work of breathing.  Able to speak fully in complete sentences  NEURO: Cranial nerves grossly intact, mentation intact and speech normal  PSYCH: mentation appears normal, affect normal/bright, judgement and insight intact, normal speech and appearance well-groomed         Data:   Reviewed in person and significant for:    Lab Results   Component Value Date     11/21/2023     07/05/2020      Lab Results   Component Value Date    POTASSIUM 4.0 11/21/2023    POTASSIUM 3.8 03/12/2022    POTASSIUM 4.2 07/05/2020     Lab Results   Component Value Date    CHLORIDE 88 11/21/2023    CHLORIDE 101 03/12/2022    CHLORIDE 102 07/05/2020     Lab Results   Component Value Date    CO2 26 11/21/2023    CO2 25 03/12/2022    CO2 28 07/05/2020     Lab Results   Component Value Date    BUN 8.3 11/21/2023    BUN 11 03/12/2022    BUN 13 07/05/2020     Lab Results   Component Value Date    CR 0.68 11/21/2023    CR 0.95 07/05/2020       Lab Results   Component Value Date    WBC 5.3 11/21/2023    WBC 10.8 12/19/2006     Lab Results   Component Value " Date    HGB 11.5 11/21/2023    HGB 16.9 12/19/2006     Lab Results   Component Value Date    HCT 35.1 11/21/2023    HCT 47.5 12/19/2006     Lab Results   Component Value Date     11/21/2023    MCV 85 12/19/2006     Lab Results   Component Value Date    PLT 81 11/21/2023     12/19/2006       Lab Results   Component Value Date    AST 73 09/26/2023    AST 52 12/19/2006     Lab Results   Component Value Date    ALT 39 09/26/2023    ALT 50 12/19/2006     Lab Results   Component Value Date    BILICONJ 0.0 12/19/2006      Lab Results   Component Value Date    BILITOTAL 9.3 09/26/2023    BILITOTAL 0.8 12/19/2006       Lab Results   Component Value Date    ALBUMIN 2.2 09/26/2023    ALBUMIN 4.0 09/03/2022    ALBUMIN 5.2 12/19/2006     Lab Results   Component Value Date    PROTTOTAL 6.2 09/26/2023    PROTTOTAL 9.7 12/19/2006      Lab Results   Component Value Date    ALKPHOS 216 09/26/2023    ALKPHOS 117 12/19/2006       Lab Results   Component Value Date    INR 2.29 11/14/2023     US ABDOMEN COMPLETE WITH DOPPLER:   EXAM: DOPPLER SONOGRAM OF LIVER     DATE: 9/5/2023 6:40 PM     CLINICAL: E87.1 Hypo-osmolality and hyponatremia. Elevated liver function tests.     COMPARISON: Renal sonogram of 7/27/2023; CT scan of abdomen and pelvis done 6/26/2023.     TECHNIQUE: Real-time grayscale, color Doppler, and spectral Doppler waveform analysis used to evaluate major upper abdominal vascular structures with particular attention to the liver and spleen.     FINDINGS: The liver has nodular contours in keeping with known hepatic cirrhosis. The portal vein is patent but with hepatofugal flow; flow in the right branch of the portal vein is also retrograde, though antegrade in the left branch of the portal vein. The splenic vein has retrograde flow.     Hepatic artery patent. The left, middle, and right hepatic veins are patent.     Inferior vena cava and aorta are patent.     No ascites seen.

## 2023-11-22 NOTE — TELEPHONE ENCOUNTER
Talked to patient's wife.  Blood sugars were running high because he had a malfunction with insulin pen for 3 days.  New new pen started.  Due to insurance reason the short acting insulin did not get approved so a new prescription of NovoLog was sent yesterday.  Hopefully will pick it up today and start it with meals.

## 2023-11-22 NOTE — NURSING NOTE
Is the patient currently in the state of [place]? YES    Visit mode:VIDEO    If the visit is dropped, the patient can be reconnected by: VIDEO VISIT: Text to cell phone:   Telephone Information:   Mobile [phone]       Will anyone else be joining the visit? Yes, spouse will join visit  (If patient encounters technical issues they should call [phone]    How would you like to obtain your AVS? MyChart    Are changes needed to the allergy or medication list? No    Reason for visit: RECHECK    Patti CHRISTY

## 2023-11-22 NOTE — PROGRESS NOTES
Clinic Care Coordination Contact  Clinic Care Coordination Contact  OUTREACH    Referral Information:       Primary Diagnosis: Psychosocial    Chief Complaint   Patient presents with    Clinic Care Coordination - Initial        Universal Utilization:      Utilization      No Show Count (past year)  1             ED Visits  0             Hospital Admissions  0                    Current as of: 11/22/2023 11:50 AM                Clinical Concerns:  Current Medical Concerns:      Current Behavioral Concerns:     SUMEET ARRIAZA contacted patient's wife, Adina, to introduce self and offer care coordination services. Patient referred to SUMEET CC for assistance with scheduling individual therapy.    SUMEET CC assisted patient's wife with setting up a teletherapy appointment on 11/30 for the patient. SUMEET ARRIAZA will send appointment information via My Chart.    Date: Thursday, 11/30/2023  Time: 5:00 pm - 5:50 pm  Provider[facility]  Location: [place] [address]  Phone: [phone]  Type: Teletherapy      Education Provided to patient: CC SW called and spoke with patient; introduced self, discussed role of Care Coordination, and explained reason for call.     Pain  Pain (GOAL):: No        Social Determinants of Health     Food Insecurity: Low Risk  (10/3/2023)    Food Insecurity     Within the past 12 months, did you worry that your food would run out before you got money to buy more?: No     Within the past 12 months, did the food you bought just not last and you didn t have money to get more?: No   Depression: Not at risk (11/16/2023)    PHQ-2     PHQ-2 Score: 0   Recent Concern: Depression - At risk (11/9/2023)    PHQ-2     PHQ-2 Score: 3   Housing Stability: Low Risk  (10/3/2023)    Housing Stability     Do you have housing? : Yes     Are you worried about losing your housing?: No   Tobacco Use: High Risk (11/22/2023)    Patient History     Smoking Tobacco Use: Never      Smokeless Tobacco Use: Current     Passive Exposure: Never   Financial Resource Strain: Low Risk  (10/3/2023)    Financial Resource Strain     Within the past 12 months, have you or your family members you live with been unable to get utilities (heat, electricity) when it was really needed?: No   Alcohol Use: Not on file   Transportation Needs: Low Risk  (10/3/2023)    Transportation Needs     Within the past 12 months, has lack of transportation kept you from medical appointments, getting your medicines, non-medical meetings or appointments, work, or from getting things that you need?: No   Physical Activity: Not on file   Interpersonal Safety: Not on file   Stress: Not on file   Social Connections: Not on file     Inadequate nutrition (GOAL):: No  Tube Feeding: No  Inadequate activity/exercise (GOAL):: No  Significant changes in sleep pattern (GOAL): No         Patient/Caregiver understanding: Patient verbalized understanding, engaged in AIDET communication during patient encounter.         Future Appointments                In 6 days LAB FIRST FLOOR [facility] [place]    In 6 days Suzanne Todd PA-C [facility] Endocrinology Clinic [facility]    In 2 weeks LAB FIRST FLOOR [facility] [place]    In 2 weeks LAB FIRST FLOOR [facility] [place]    In 3 weeks Liana Garcia RD [facility] Diabetes Education [facility]    In 1 month LAB FIRST FLOOR [facility] [place]    In 1 month  LAB [facility] Lab [facility]    In 1 month Quynh Longoria MD [facility] Hepatology Clinic [facility]            Plan: SUMEET CC will send appointment information to patient via My Chart per his wife's request. Patient or wife will contact SUMEET CC with any questions or concerns. SUMEET  CC will follow up after the appointment on 11/30.    NEAL Hernández  Clinic Care Coordination  [facility]  [email or web address]  [phone]

## 2023-11-22 NOTE — LETTER
11/22/2023         RE: Mary Lopez  [address] [place] [address]        Dear Colleague,    Thank you for referring your patient, Mary Lopez, to the [facility] HEPATOLOGY [facility] [place]. Please see a copy of my visit note below.    Mary is a 53 year old who is being evaluated via a billable video visit.      How would you like to obtain your AVS? MyChart  If the video visit is dropped, the invitation should be resent by: Text to cell phone: [phone]  Will anyone else be joining your video visit? No    Video-Visit Details    Type of service:  Video Visit   Joined the call at 11/22/2023, 11:18:10?am.  Left the call at 11/22/2023, 11:43:53?am.  Originating Location (pt. 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 No alcohol since June 2023. MELD 3.0 in September was 30. No recent hepatic panel, but continues to have elevated INR of 2.29. If he continues to have significant liver dysfunction, discussing moving forward with liver transplant evaluation.     # Assessment of liver function: Hepatic panel, BMP, INR next week     # Repeat IgG (IgG previously 2330, SMA 70), IgG sub-class    # Based on any on-going elevations in transaminases and bilirubin, IgG levels may recommend moving forward with MRI/MRCP (rapid onset DM requiring insulin) vs liver biopsy     # HCC screening, up to date:   - Due in March 2024    # Variceal screening/history of G1 EV/GOV Type1, up to date:   - Repeat July 2024 or sooner as indicated    # Lower extremity edema, managed:   # Pleural effursions, improved:   # Ascites, improved:   -Continue torsemide 10 mg daily    -Continue 20 mEq potassium chloride daily    # Anemia, multi-factorial (haptaglobin low, LDH high), improved, now up to 11.5   - Trend hemoglobin     # Hepatic Encephalopathy, fairly well controlled:   - Continue lactulose daily (adjust dose to 2-3 bowel movements a day)     # Hyponatremia:  - Needs improved blood glucose control, prescribed short acting insulin yesterday   - Continue 2000 mg sodium diet     # DM type 2, CLAY:   - Continue optimization of blood glucose   - High risk for dehydration     # History of alcohol abuse:  - No alcohol since June 25, 2023.  Congratulated on his efforts with maintaining sobriety   - Has completed Rule 25, with last one through [employer] recommending individual behavior   - Continue Absolute sobriety from alcohol    # Follow up with Dr. Longoria in January as previously scheduled     Sher Dutton PA-C   [facility] Hepatology [facility]    Total time for E/M services performed on the date of the encounter 45 minutes.  This included review of previous: clinic visits, hospital records, lab results, imaging studies, and procedural documentation. Time also includes patient visit, documentation and discussion with other providers.  The findings from this review are summarized in the above note.   -----------------------------------------------------       HPI:   Mary Lopez is a 53 year old male presenting for follow-up.     ETOH Cirrhosis  Complicated by:  - No history of ascites   - History of GI bleed.   EGD: (7/26/2023) grade 1 esophageal varices, moderate PHG with oozing and type II GOV 2   HCC: US abdomen liver Doppler in September 2023     Patient was last seen by me on 9/27/2023. Was diagnosed with CLAY and started on insulin and has been working with DM Educator. PCP orders short acting insulin yesterday, hasn't started.     Per chart review, weight is down about 50 lb in the last 2-3 months, primarily fluid weight. No fluid restriction lately.     Eating is  more sporatic (trying to do two bigger meals). 1/2 sandwich w/ boost     Not much fluid in legs. Legs feel a little heavier. No numbness or tingling in legs.   Will go out to store for shopping. No problems with bloating in abdomen.     Overall confusion//mental fuzziness improving.     Will intermittently have blood with bowel movements,     Patient also denies melena, or hematemesis.  Patient denies  fevers, sweats or chills.    No CD treatment in the past. Denies cravings. Last drank June 25th, 2023. Negative Peth 7/26/2023. Has contact information to start therapy appointment, not scheduled yet.     Previous work-up:   Lab Results   Component Value Date    AUSAB 2.19 08/17/2023    KE 4,261 (H) 08/23/2023    IRONSAT  08/23/2023      Comment:      Unable to calculate:  UIBC or Iron value is outside detectable level.    TTG 2.2 09/13/2023    TTGG 2.0 09/13/2023     (H) 09/13/2023    SMOOTHMUS 70 (H) 09/13/2023    A1BPBYG Whole Blood 09/13/2023    A1A 97 09/13/2023    A9FGDMC Negative 09/13/2023    K1NJGMR Heterozygous (A) 09/13/2023    R3UDWFPU Not Applicable 09/13/2023    U7GFTXEXR See Note 09/13/2023    MITM2 1.2 08/23/2023    TSH 2.18 03/12/2022    CHOL 203 (H) 03/12/2022    HDL 71 03/12/2022     (H) 03/12/2022    TRIG 151 (H) 03/12/2022    A1C 8.1 (H) 10/31/2023    POPETH <10 11/14/2023    PLPETH <10 11/14/2023      Lab Results   Component Value Date    SPECDES  09/13/2023     Blood: ACD      LDRESULTS  09/13/2023     HEMOCHROMATOSIS RESULTS    HFE Gene C282Y (G845A) RESULTS:    C282Y Mutation Interpretation: HETEROZYGOTE    HFE Gene H63D (C187G) RESULTS:    H63D Mutation Interpretation: NORMAL    HFE Gene S65C (A193T) RESULTS:    S65C Mutation Interpretation: NORMAL             Medications:     Current Outpatient Medications   Medication    blood glucose (NO BRAND SPECIFIED) test strip    blood glucose monitoring (NO BRAND SPECIFIED) meter device kit    Continuous Blood Gluc Sensor (DEXCOM G7  "SENSOR) MISC    cyclobenzaprine (FLEXERIL) 10 MG tablet    doxepin (SINEQUAN) 10 MG capsule    ferrous sulfate (FEROSUL) 325 (65 Fe) MG tablet    insulin aspart (NOVOLOG FLEXPEN) 100 UNIT/ML pen    insulin glargine (SEMGLEE) 100 UNIT/ML pen    insulin lispro (HUMALOG KWIKPEN) 100 UNIT/ML (1 unit dial) KWIKPEN    lactulose (CHRONULAC) 10 GM/15ML solution    melatonin 10 MG TABS tablet    metFORMIN (GLUCOPHAGE XR) 500 MG 24 hr tablet    multivitamin w/minerals (THERA-VIT-M) tablet    omeprazole (PRILOSEC) 20 MG DR capsule    potassium chloride ER (KLOR-CON M) 10 MEQ CR tablet    thin (NO BRAND SPECIFIED) lancets    torsemide (DEMADEX) 10 MG tablet    zinc oxide (DESITIN) 20 % external ointment     No current facility-administered medications for this visit.            Review of Systems:   10 points ROS was obtained and highlighted in the HPI, otherwise negative.          Physical Exam:   VS:  Ht 1.778 m (5' 10\")   Wt 93.4 kg (206 lb)   BMI 29.56 kg/m        GENERAL: alert and no distress, some jaundice  EYES: Eyes grossly normal to inspection, icteric sclera  RESP: no audible wheeze, cough, or visible cyanosis.  No visible retractions or increased work of breathing.  Able to speak fully in complete sentences  NEURO: Cranial nerves grossly intact, mentation intact and speech normal  PSYCH: mentation appears normal, affect normal/bright, judgement and insight intact, normal speech and appearance well-groomed         Data:   Reviewed in person and significant for:    Lab Results   Component Value Date     11/21/2023     07/05/2020      Lab Results   Component Value Date    POTASSIUM 4.0 11/21/2023    POTASSIUM 3.8 03/12/2022    POTASSIUM 4.2 07/05/2020     Lab Results   Component Value Date    CHLORIDE 88 11/21/2023    CHLORIDE 101 03/12/2022    CHLORIDE 102 07/05/2020     Lab Results   Component Value Date    CO2 26 11/21/2023    CO2 25 03/12/2022    CO2 28 07/05/2020     Lab Results   Component Value Date "    BUN 8.3 11/21/2023    BUN 11 03/12/2022    BUN 13 07/05/2020     Lab Results   Component Value Date    CR 0.68 11/21/2023    CR 0.95 07/05/2020       Lab Results   Component Value Date    WBC 5.3 11/21/2023    WBC 10.8 12/19/2006     Lab Results   Component Value Date    HGB 11.5 11/21/2023    HGB 16.9 12/19/2006     Lab Results   Component Value Date    HCT 35.1 11/21/2023    HCT 47.5 12/19/2006     Lab Results   Component Value Date     11/21/2023    MCV 85 12/19/2006     Lab Results   Component Value Date    PLT 81 11/21/2023     12/19/2006       Lab Results   Component Value Date    AST 73 09/26/2023    AST 52 12/19/2006     Lab Results   Component Value Date    ALT 39 09/26/2023    ALT 50 12/19/2006     Lab Results   Component Value Date    BILICONJ 0.0 12/19/2006      Lab Results   Component Value Date    BILITOTAL 9.3 09/26/2023    BILITOTAL 0.8 12/19/2006       Lab Results   Component Value Date    ALBUMIN 2.2 09/26/2023    ALBUMIN 4.0 09/03/2022    ALBUMIN 5.2 12/19/2006     Lab Results   Component Value Date    PROTTOTAL 6.2 09/26/2023    PROTTOTAL 9.7 12/19/2006      Lab Results   Component Value Date    ALKPHOS 216 09/26/2023    ALKPHOS 117 12/19/2006       Lab Results   Component Value Date    INR 2.29 11/14/2023     US ABDOMEN COMPLETE WITH DOPPLER:   EXAM: DOPPLER SONOGRAM OF LIVER     DATE: 9/5/2023 6:40 PM     CLINICAL: E87.1 Hypo-osmolality and hyponatremia. Elevated liver function tests.     COMPARISON: Renal sonogram of 7/27/2023; CT scan of abdomen and pelvis done 6/26/2023.     TECHNIQUE: Real-time grayscale, color Doppler, and spectral Doppler waveform analysis used to evaluate major upper abdominal vascular structures with particular attention to the liver and spleen.     FINDINGS: The liver has nodular contours in keeping with known hepatic cirrhosis. The portal vein is patent but with hepatofugal flow; flow in the right branch of the portal vein is also retrograde, though  antegrade in the left branch of the portal vein. The splenic vein has retrograde flow.     Hepatic artery patent. The left, middle, and right hepatic veins are patent.     Inferior vena cava and aorta are patent.     No ascites seen.       Again, thank you for allowing me to participate in the care of your patient.        Sincerely,        Sher Dutton PA-C

## 2023-11-27 ENCOUNTER — TELEPHONE (OUTPATIENT)
Dept: FAMILY MEDICINE | Facility: CLINIC | Age: 53
End: 2023-11-27
Payer: COMMERCIAL

## 2023-11-27 NOTE — TELEPHONE ENCOUNTER
Forms/Letter Request    Type of form/letter: [facility] [employer] Incident Number [id number]      Have you been seen for this request: N/A    Do we have the form/letter: Yes: placed in providers forms basket for review    When is form/letter needed by: ASAP    How would you like the form/letter returned: Fax  [phone]

## 2023-11-27 NOTE — TELEPHONE ENCOUNTER
Please send the medical record that is requested for letter.  Needs to send to medical record department.

## 2023-11-28 ENCOUNTER — PRE VISIT (OUTPATIENT)
Dept: ENDOCRINOLOGY | Facility: CLINIC | Age: 53
End: 2023-11-28

## 2023-11-28 ENCOUNTER — LAB (OUTPATIENT)
Dept: LAB | Facility: CLINIC | Age: 53
End: 2023-11-28
Payer: COMMERCIAL

## 2023-11-28 ENCOUNTER — VIRTUAL VISIT (OUTPATIENT)
Dept: ENDOCRINOLOGY | Facility: CLINIC | Age: 53
End: 2023-11-28
Payer: COMMERCIAL

## 2023-11-28 VITALS — BODY MASS INDEX: 30.06 KG/M2 | WEIGHT: 210 LBS | HEIGHT: 70 IN

## 2023-11-28 DIAGNOSIS — E11.00 TYPE 2 DIABETES MELLITUS WITH HYPEROSMOLARITY WITHOUT COMA, WITH LONG-TERM CURRENT USE OF INSULIN (H): ICD-10-CM

## 2023-11-28 DIAGNOSIS — K70.30 ALCOHOLIC CIRRHOSIS OF LIVER WITHOUT ASCITES (H): ICD-10-CM

## 2023-11-28 DIAGNOSIS — Z79.4 TYPE 2 DIABETES MELLITUS WITH HYPEROSMOLARITY WITHOUT COMA, WITH LONG-TERM CURRENT USE OF INSULIN (H): ICD-10-CM

## 2023-11-28 DIAGNOSIS — D64.9 ANEMIA, UNSPECIFIED TYPE: ICD-10-CM

## 2023-11-28 DIAGNOSIS — K86.9 DIABETES MELLITUS ASSOCIATED WITH PANCREATIC DISEASE (H): Primary | ICD-10-CM

## 2023-11-28 DIAGNOSIS — Z14.8 ALPHA-1-ANTITRYPSIN DEFICIENCY CARRIER: ICD-10-CM

## 2023-11-28 DIAGNOSIS — E11.69 DIABETES MELLITUS ASSOCIATED WITH PANCREATIC DISEASE (H): Primary | ICD-10-CM

## 2023-11-28 DIAGNOSIS — D64.9 ANEMIA DUE TO UNKNOWN MECHANISM: ICD-10-CM

## 2023-11-28 DIAGNOSIS — Z14.8 CARRIER OF HEMOCHROMATOSIS HFE GENE MUTATION: ICD-10-CM

## 2023-11-28 DIAGNOSIS — K70.30 ALCOHOLIC CIRRHOSIS OF LIVER WITHOUT ASCITES (H): Primary | ICD-10-CM

## 2023-11-28 LAB
ALBUMIN SERPL BCG-MCNC: 2.7 G/DL (ref 3.5–5.2)
ALP SERPL-CCNC: 376 U/L (ref 40–150)
ALT SERPL W P-5'-P-CCNC: 42 U/L (ref 0–70)
ANION GAP SERPL CALCULATED.3IONS-SCNC: 9 MMOL/L (ref 7–15)
AST SERPL W P-5'-P-CCNC: 81 U/L (ref 0–45)
BASOPHILS # BLD AUTO: 0.1 10E3/UL (ref 0–0.2)
BASOPHILS NFR BLD AUTO: 1 %
BILIRUB DIRECT SERPL-MCNC: 3.1 MG/DL (ref 0–0.3)
BILIRUB SERPL-MCNC: 7.5 MG/DL
BUN SERPL-MCNC: 4.8 MG/DL (ref 6–20)
CALCIUM SERPL-MCNC: 9.3 MG/DL (ref 8.6–10)
CHLORIDE SERPL-SCNC: 92 MMOL/L (ref 98–107)
CREAT SERPL-MCNC: 0.62 MG/DL (ref 0.67–1.17)
DEPRECATED HCO3 PLAS-SCNC: 30 MMOL/L (ref 22–29)
EGFRCR SERPLBLD CKD-EPI 2021: >90 ML/MIN/1.73M2
EOSINOPHIL # BLD AUTO: 0.2 10E3/UL (ref 0–0.7)
EOSINOPHIL NFR BLD AUTO: 4 %
ERYTHROCYTE [DISTWIDTH] IN BLOOD BY AUTOMATED COUNT: 15.2 % (ref 10–15)
GLUCOSE SERPL-MCNC: 398 MG/DL (ref 70–99)
HCT VFR BLD AUTO: 33.9 % (ref 40–53)
HGB BLD-MCNC: 11.5 G/DL (ref 13.3–17.7)
IGG SERPL-MCNC: 2499 MG/DL (ref 610–1616)
IGG SERPL-MCNC: 2499 MG/DL (ref 610–1616)
IGG1 SER-MCNC: 1523 MG/DL (ref 382–929)
IGG2 SER-MCNC: 734 MG/DL (ref 242–700)
IGG3 SER-MCNC: 199 MG/DL (ref 22–176)
IGG4 SER-MCNC: 131 MG/DL (ref 4–86)
IMM GRANULOCYTES # BLD: 0 10E3/UL
IMM GRANULOCYTES NFR BLD: 1 %
INR PPP: 2.26 (ref 0.85–1.15)
LYMPHOCYTES # BLD AUTO: 1 10E3/UL (ref 0.8–5.3)
LYMPHOCYTES NFR BLD AUTO: 20 %
MCH RBC QN AUTO: 32.9 PG (ref 26.5–33)
MCHC RBC AUTO-ENTMCNC: 33.9 G/DL (ref 31.5–36.5)
MCV RBC AUTO: 97 FL (ref 78–100)
MONOCYTES # BLD AUTO: 0.5 10E3/UL (ref 0–1.3)
MONOCYTES NFR BLD AUTO: 10 %
NEUTROPHILS # BLD AUTO: 3.2 10E3/UL (ref 1.6–8.3)
NEUTROPHILS NFR BLD AUTO: 64 %
NRBC # BLD AUTO: 0 10E3/UL
NRBC BLD AUTO-RTO: 0 /100
PLATELET # BLD AUTO: 81 10E3/UL (ref 150–450)
POTASSIUM SERPL-SCNC: 3.6 MMOL/L (ref 3.4–5.3)
PROT SERPL-MCNC: 7.6 G/DL (ref 6.4–8.3)
RBC # BLD AUTO: 3.5 10E6/UL (ref 4.4–5.9)
SODIUM SERPL-SCNC: 131 MMOL/L (ref 135–145)
SUBCLASSES, PERCENT: 104 %
WBC # BLD AUTO: 5.1 10E3/UL (ref 4–11)

## 2023-11-28 PROCEDURE — 80053 COMPREHEN METABOLIC PANEL: CPT

## 2023-11-28 PROCEDURE — 85610 PROTHROMBIN TIME: CPT

## 2023-11-28 PROCEDURE — 82784 ASSAY IGA/IGD/IGG/IGM EACH: CPT

## 2023-11-28 PROCEDURE — 99205 OFFICE O/P NEW HI 60 MIN: CPT | Mod: VID | Performed by: PHYSICIAN ASSISTANT

## 2023-11-28 PROCEDURE — 86038 ANTINUCLEAR ANTIBODIES: CPT

## 2023-11-28 PROCEDURE — 85025 COMPLETE CBC W/AUTO DIFF WBC: CPT

## 2023-11-28 PROCEDURE — 36415 COLL VENOUS BLD VENIPUNCTURE: CPT

## 2023-11-28 PROCEDURE — 82787 IGG 1 2 3 OR 4 EACH: CPT

## 2023-11-28 PROCEDURE — 82248 BILIRUBIN DIRECT: CPT

## 2023-11-28 RX ORDER — INSULIN DEGLUDEC 100 U/ML
40-50 INJECTION, SOLUTION SUBCUTANEOUS DAILY
Qty: 60 ML | Refills: 1 | Status: ON HOLD | OUTPATIENT
Start: 2023-11-28 | End: 2024-03-02

## 2023-11-28 RX ORDER — INSULIN LISPRO 200 [IU]/ML
10-18 INJECTION, SOLUTION SUBCUTANEOUS
Qty: 60 ML | Refills: 1 | Status: ON HOLD | OUTPATIENT
Start: 2023-11-28 | End: 2024-03-02

## 2023-11-28 ASSESSMENT — PAIN SCALES - GENERAL: PAINLEVEL: NO PAIN (0)

## 2023-11-28 NOTE — LETTER
11/28/2023       RE: Mary Lopez  [address]  [place] [address]     Dear Colleague,    Thank you for referring your patient, Mary Lopez, to the [facility] [place] at [facility]. Please see a copy of my visit note below.             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     He has been admitted to the hospital 3 times since June of this year twice for decompensated liver disease and once for GI bleed.  He also did have an inflamed scrotum.    He is here to establish care for treatment of his diabetes.      HPI:    Brianda presents to clinic with his wife Adina.  They report that the diabetes came on very quickly about 6 weeks ago.  He went from having pretty regular blood sugars to having a blood sugar over 500 and lab work that was done for something else.  It was thought that he might have type 1 diabetes, but they understand from meeting with Liana Garcia that that is not the case.  He was started on metformin 500 mg in late October soon after he was  started on Semglee.  More recently he has started taking 10 units of Humalog with meals.  They only can guess that it might be related to his liver disease  because nothing else has really changed.  He did have negative screens for diabetes in both March and August of this year with normal A1c's.    He does have a brother and his father with type 2 diabetes.  He has no known relatives with type 1 diabetes.  Also regards to personal and family history he has no known history of pancreatitis and no personal or family history of pancreatic cancer or thyroid cancer or multiple endocrine neoplasia's.  To his knowledge she has never had abnormal imaging of his pancreas, nor is anyone suspected he have pancreatitis.      His appetite has been poor and his wife been has been happy if he eats anything.  He is trying to consolidate his grazing into meals and will have 1-2 meals per day.    His wife notes that on occasion the Dexcom appears to go blank and will have a low reading as it did this morning around 90 but then when he picked back up the blood sugar was again 200, it was 400 and had dropped off.  They deny giving any insulin prior to this.  The last insulin dose was the Semglee 35 units that they gave last night.    At the end of the visit Akiko asks if I can recommend marijuana Gummies for insomnia.  He has seen sleep clinic and tried melatonin previously.    Current Diabetes Medications:    Semglee 35 units daily.  Humalog 10 units with each meal -today he ate about half hour ago, yesterday he did eat twice and thus had 20 units Humalog, the day prior he ate just once and had 10 units of Humalog.  Metformin 500 mg daily with supper.    We reviewed glucometer/CGMS data together.  It revealed:  Average blood sugar of 389 with 100% of blood sugar above range.    I do note however there are some points when the blood sugar comes now down near the 180 storm.  There is a noted recording of the 90 but it does appear Dexcom  "had cut off during this point in time.      Please see details below.  I did also look online and indeed it appears this morning there was interruption in the connection and Dexcom reading.  I do not believe that his blood sugar was ever as low as 70, but rather this was related to signal loss.            Mary's PMH, PSH and allergies were reviewed today and pertinent information is summarized above.    Mary's pertinent social and family history are also reviewed today and pertinent information is summarized above.      Current Outpatient Medications   Medication    blood glucose (NO BRAND SPECIFIED) test strip    blood glucose monitoring (NO BRAND SPECIFIED) meter device kit    Continuous Blood Gluc Sensor (DEXCOM G7 SENSOR) MISC    cyclobenzaprine (FLEXERIL) 10 MG tablet    doxepin (SINEQUAN) 10 MG capsule    ferrous sulfate (FEROSUL) 325 (65 Fe) MG tablet    insulin aspart (NOVOLOG FLEXPEN) 100 UNIT/ML pen    insulin glargine (SEMGLEE) 100 UNIT/ML pen    insulin lispro (HUMALOG KWIKPEN) 100 UNIT/ML (1 unit dial) KWIKPEN    lactulose (CHRONULAC) 10 GM/15ML solution    melatonin 10 MG TABS tablet    metFORMIN (GLUCOPHAGE XR) 500 MG 24 hr tablet    multivitamin w/minerals (THERA-VIT-M) tablet    omeprazole (PRILOSEC) 20 MG DR capsule    potassium chloride ER (KLOR-CON M) 10 MEQ CR tablet    thin (NO BRAND SPECIFIED) lancets    torsemide (DEMADEX) 10 MG tablet    zinc oxide (DESITIN) 20 % external ointment     No current facility-administered medications for this visit.         ROS:   Reports good physical activity tolerance.  Denies any pedal lesions or vision changes or concerns. Denies any other acute concerns except as noted above.      Exam:    Ht 1.778 m (5' 10\")   Wt 95.3 kg (210 lb)   BMI 30.13 kg/m      General: Pleasant, well nourished and hydrated male in NAD.  Seated comfortably at home with his wife.  His wife provides the majority of the history.    Psych:  Mood is \"good,\" affect is appropriate.  " Thought form and content are fluid and coherent.    HEENT: Eyes and sclera are clear. Extraocular movements are grossly intact without proptosis.  Nares are patent, mucous membranes moist.  Neck: No masses or JVD are noted.    Resp: Easy and unlabored breathing.   Neuro: Alert.  He is able to answer some of my questions.      Data:      Recent Labs   Lab Test 11/28/23  1013 11/21/23  1534 11/07/23  1030 10/31/23  1137 10/03/23  1649 09/26/23  1701 08/29/23  1018 08/23/23  1018 09/03/22  1006 03/12/22  1007 03/12/22  1006 12/04/21  0945   0000   A1C  --   --   --  8.1*  --   --   --  5.1  --  5.7*  --   --    < >   TSH  --   --   --   --   --   --   --   --   --   --  2.18  --   --    LDL  --   --   --   --   --   --   --   --   --   --  102* 120*  --    HDL  --   --   --   --   --   --   --   --   --   --  71 76  --    TRIG  --   --   --   --   --   --   --   --   --   --  151* 174*  --    CR 0.62* 0.68   < > 0.79   < > 0.80   < >  --    < >  --  0.70 1.43*  --    MICROL  --   --   --   --   --   --   --   --   --  45  --   --   --    AST 81*  --   --   --   --  73*   < >  --    < >  --  186*  --   --    ALT 42  --   --   --   --  39   < >  --    < >  --  93*  --   --     < > = values in this interval not displayed.           Most recent GFRs:    Lab Results   Component Value Date    GFRESTIMATED >90 11/28/2023    GFRESTIMATED >90 11/21/2023    GFRESTIMATED >90 11/14/2023    GFRESTBLACK >90 07/05/2020    GFRESTBLACK >80 11/29/2005       Lab Results   Component Value Date    CPEPT 3.7 10/31/2023    GADAB <5.0 11/03/2023     His blood sugar at the time of the C-peptide drawn was 761.  This likely does represent insulin insufficiency.    FIB-4 Calculation: 8.02 at 11/28/2023 10:13 AM  Calculated from:  SGOT/AST: 81 U/L at 11/28/2023 10:13 AM  SGPT/ALT: 42 U/L at 11/28/2023 10:13 AM  Platelets: 81 10e3/uL at 11/28/2023 10:13 AM  Age: 53 years  AST   Date Value Ref Range Status   11/28/2023 81 (H) 0 - 45 U/L Final      Comment:     Reference intervals for this test were updated on 6/12/2023 to more accurately reflect our healthy population. There may be differences in the flagging of prior results with similar values performed with this method. Interpretation of those prior results can be made in the context of the updated reference intervals.   12/19/2006 52 0 - 55 U/L Final     Lab Results   Component Value Date    ALT 42 11/28/2023    ALT 50 12/19/2006     chart review reveals a normal amylase value nearly a decade ago.  He had ultrasound of the abdomen in October 2022 that noted normal-appearing head and body of the pancreas, tail was obscured by gas in the bowel.          Assessment/Plan:    Mary Lopez is a 53 year old male with recently diagnosed uncontrolled type 2 diabetes.  His blood sugars are far above goal with an average blood sugar of 386, and blood sugars predominantly in the greater than 400 range all day every day.  He has been having difficulties with hepatic cirrhosis and has a poor appetite.  Because of the sudden onset of diabetes is unclear.  He is having pancreatic imaging in the coming weeks and that may be revealing.  Otherwise I do think I will recheck his C-peptide and glucose possibly insulin antibodies again in a couple of months.      1.  Type 2 diabetes versus more likely insulin insufficiency:   We will work to bring his blood sugars into goal ranges soon as possible, but safely and avoiding hypoglycemia and is this point will primarily use insulin.  He would be an excellent candidate for GLP-1 agonist but will have pancreatic imaging in the next couple of weeks and I want to make sure that that is normal.  I am also concerned about further limitation of his appetite.  His blood sugars at this point are too high to use SGLT2 inhibitor without concern for infection, I also am concerned that he is insulin deficient and this could lead to DKA so I strongly recommended against the use of SGLT2  inhibitor until the cause of his diabetes is more clear.    Today I am advising him to increase his Semglee to 40 units daily, and further advising that they increase this dose by 1 unit each day until they are able to see a fasting blood sugar under 150 as long as this does not lead to any low blood sugars.  Furthermore we discussed the use of correction insulin.  I will have him begin using a scale of 1 per 35 greater than 175 during the day, and greater than 210 at bedtime.  Again they will meet with Liana in the next couple of weeks to review this and possibly increase insulin doses.     Will need to continue to monitor him closely as well he currently is insulin-dependent, it seems likely that this will continue to increase.        2. DM Complications-     Again this was recently diagnosed within the last month and he does have normal A1c's earlier this year so is unlikely to have diabetes complications.  Unfortunately our appointment today was scheduled for only 30 minutes.      71 minutes spent on the date of the encounter doing chart review, history and exam, documentation, education and counseling, as well as communication and coordination of care, and further activities as noted above.  This time includes time spent reviewing CGM.      It is my privilege to be involved in the care of the above patient.     Suzanne Todd PA-C, MPAS  [facility]  Diabetes, Endocrinology, and Metabolism  [phone] Appointments/Nurse  [phone] pager  [phone] nurse line    This note was completed in part using Dragon voice recognition, and may contain word and grammatical errors.      Virtual Visit Details    Type of service:  Video Visit     Joined the call at 11/28/2023, 12:07:58 pm.  Left the call at 11/28/2023, 12:37:16 pm.  You were on the call for 29 minutes 17 seconds .    Originating Location (pt. Location): Home  Distant Location (provider location):  Off-site  Platform used for Video  Visit: Yenifer    Outcome for 11/21/23 8:41 AM: Data uploaded on Dexcom  Feli Hannon MA  Outcome for 11/24/23 1:55 PM: Data obtained via Dexcom website  Feli Hannon MA

## 2023-11-28 NOTE — PATIENT INSTRUCTIONS
Dear Mary and Adina,    It is hayden to meet you.    Please increase your Semglee dose to 40 units daily.  Please see how this affects your blood sugars for the next 3 to 4 days.  Following that please increase your Semglee dose, by 1 unit daily until you see if fasting blood sugar less than 150.  Then continue that dose.    Please continue to give 10 units of Humalog with every meal.  Additionally however, if it has been at least 4 hours since your last Humalog injection and your blood sugars greater than 175, please add a correction as below in hopes of bringing your blood sugar into range within 4 to 5 hours following your meal.    1 per 35 >/=175 before meals - Add to mealtime dose if Premeal blood sugar is greater than or equal to 175.  -209 = 1 units.  -244 = 2 units.  -279 = 3 units.  -314 = 4 units.  -349 = 5 units.  -384 = 6 units.  -419 = 7 units.  BG >/=420 = 8 units.      At bedtime, even if you are not eating, but your blood sugar is high and it has been at least 4 hours since your last injection, please give a correction dose of Humalog for any blood sugar that is over 210.     1 per 35 >/=210  AT BEDTIME - OR OVERNIGHT if high alarm sounds and >5 hours since last injection.      -244 = 1 units.  -279 = 2 units.  -314 = 3 units.  -349 = 4 units.  -384 = 5 units.  -419 = 6 units.  BG >/=420 = 7 units.    Our goal is to eventually bring your average blood sugar down to 150, but with rare blood sugars under 70 and none ever under 55.     Please continue to work with Liana to titrate your doses.  We may consider a class of medications called GLP-1 agonist and or SGLT2 inhibitors next at your follow-up visit.    My best wishes,    Suzanne Todd PA-C, MPAS  [facility]  Diabetes, Endocrinology, and Metabolism  [phone] Appointments/Nurse  [phone] Fax

## 2023-11-28 NOTE — NURSING NOTE
Is the patient currently in the state of [place]? YES    Visit mode:VIDEO    If the visit is dropped, the patient can be reconnected by: VIDEO VISIT: Text to cell phone:   Telephone Information:   Mobile [phone]       Will anyone else be joining the visit? NO  (If patient encounters technical issues they should call [phone])    How would you like to obtain your AVS? MyChart    Are changes needed to the allergy or medication list? No    Reason for visit: Consult   Bobbilynn Grossaint VVF

## 2023-11-29 DIAGNOSIS — K70.30 ALCOHOLIC CIRRHOSIS OF LIVER WITHOUT ASCITES (H): Primary | ICD-10-CM

## 2023-11-30 ENCOUNTER — REFERRAL (OUTPATIENT)
Dept: TRANSPLANT | Facility: CLINIC | Age: 53
End: 2023-11-30
Payer: COMMERCIAL

## 2023-11-30 DIAGNOSIS — K70.31 ALCOHOLIC CIRRHOSIS OF LIVER WITH ASCITES (H): Primary | ICD-10-CM

## 2023-11-30 DIAGNOSIS — K76.6 PORTAL HYPERTENSION (H): ICD-10-CM

## 2023-11-30 LAB — ANA SER QL IF: NEGATIVE

## 2023-11-30 NOTE — LETTER
Mary Luis Jessica  [address]  [place] [address]          Dear Mary,    Thank you for your interest in the Transplant Center at [facility]. We look forward to being a part of your care team and assisting you through the transplant process.    As we discussed, your transplant coordinator is Keaton Martinez Jr. (Liver).  You may call your coordinator at any time with questions or concerns.  Your first scheduled call will be on 12/07/2023 between 1-2 pm.  If this needs to change, call [phone].    Please complete the following.    Fill out and return the enclosed forms  Authorization for Electronic Communication  Authorization to Discuss Protected Health Information  Authorization for Release of Protected Health Information    Sign up for:  LAFASOt, access to your electronic medical record (see enclosed pamphlet)  "[employer]plantCloudy Days, a transplant education website                                                              My Transplant Place     You can use these tools to learn more about your transplant, communicate with your care team, and track your medical details      Sincerely,      Solid Organ Transplant  [facility]    cc: Referring Physician PCP

## 2023-11-30 NOTE — TELEPHONE ENCOUNTER
New referral for liver transplant evaluation    Referring: Seamus Calvillo & Riley Flynn MD,MPH (both already entered)    Please request all clinic note from [facility] be sent over, along with any EGD, colonoscopy, or imaging.     Thank you, tk        Below from Sher Dutton NP:  Can we get patient set up for eval in Jan.  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 He's set up to see Dr. Longoria in Jan already for follow up to establish care with hepatologist.    He's slowly turning a corner. Was diagnosed with latent autoimmune diabetes in recent months and had poorly controlled blood glucose/electrolyte derangements for quite while, but now improving.    Thanks, please let me know if any questions/concerns.      MELD-Na: 26 at 11/28/2023 10:13 AM

## 2023-12-01 VITALS — HEIGHT: 70 IN | WEIGHT: 210 LBS | BODY MASS INDEX: 30.06 KG/M2

## 2023-12-01 NOTE — TELEPHONE ENCOUNTER
December 1, 2023 12:06 PM - Radha Mercer LPN:    Patient was asked the following questions during liver intake call.      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 Jimmie Hoyt     1)Do you know why you have liver disease: Yes       If Alcoholic Cirrhosis is present when was your last drink:  6/25/2023      Have you ever been through treatment for alcohol: Currently in 1on1 Tx  2) Presence of Ascites: Yes  Paracentesis: No   3) Presence of Hepatic Encephalopathy: Yes  Medications: No   4) History of GI Bleeding: Yes   5) Oxygen Use: No   6) EGD: 7/26/2023 @[facility]  7) Colonoscopy: No   8) MELD Score: 26 (11/26/2023)  9)  Labs available for review from PCP/GI:  CE  10)HCC Diagnosis: No          Abdominal CT: CE; 6/26/2023 @[facility]          MRI: [facility]          Bone Scan: None found           PET: None found           Chest CT: 9/5/2023 @[facility]  HEART: Tachycardic  ABDOMEN: Soft, NT, ND, +BS  EXTREMITIES: No lower extremity edema. Warm  NEUROLOGIC: Orienetd to month, year and place  PSYCH: Mood and affect normal            Treatment Notes w/ Images: Requested from [facility]                                    11)Insurance information: [employer] (private) eff 1/1/2023 with ID [id number]; Group [id number].                 Referral intake process completed.  Patient is aware that after financial approval is received, medical records will be requested.   Patient confirmed for a callback from transplant coordinator on 12/07/2023.  Tentative evaluation date TBD.     Confirmed coordinator will discuss evaluation process in more detail at the time of their call.   Patient is aware of the need to arrange age appropriate cancer screening, vaccinations, and dental care.  Reminded patient to complete questionnaire, complete medical records release, and review packet prior to evaluation visit .  Assessed  patient for special needs (ie--wheelchair, assistance, guardian, and ):     Patient instructed to call [phone] with questions. Yes      Patient gave verbal consent during intake call to obtain medical records and documents outside of [facility]:   Yes

## 2023-12-04 ENCOUNTER — TELEPHONE (OUTPATIENT)
Dept: TRANSPLANT | Facility: CLINIC | Age: 53
End: 2023-12-04
Payer: COMMERCIAL

## 2023-12-04 ENCOUNTER — MYC MEDICAL ADVICE (OUTPATIENT)
Dept: ENDOCRINOLOGY | Facility: CLINIC | Age: 53
End: 2023-12-04
Payer: COMMERCIAL

## 2023-12-04 NOTE — TELEPHONE ENCOUNTER
Left Voicemail (1st Attempt) and Sent Mychart (1st Attempt) for the patient to call back and schedule the following:    Appointment type: Return Diabetes  Provider: Suzanne Todd  Return date: 6-12 weeks  Specialty phone number: [phone]  Additional appointment(s) needed: NA  Additonal Notes: NA

## 2023-12-04 NOTE — TELEPHONE ENCOUNTER
[facility] Call Center    Phone Message    May a detailed message be left on voicemail: yes     Reason for Call: Other: Wife Adina called to schedule Social work appointment, note was left that ATILIO eval also needed to be completed. Wife states that it was already completed. Upon looking at the  does see that the patient was seen on 11/9 for ATILIO eval. Can this order be canceled? Please let the scheduling team know if that is okay to do.     Action Taken: Message routed to:  [facility] Keaton BOOKER    Travel Screening: Not Applicable

## 2023-12-06 ENCOUNTER — LAB (OUTPATIENT)
Dept: LAB | Facility: CLINIC | Age: 53
End: 2023-12-06
Payer: COMMERCIAL

## 2023-12-06 ENCOUNTER — TRANSFERRED RECORDS (OUTPATIENT)
Dept: HEALTH INFORMATION MANAGEMENT | Facility: CLINIC | Age: 53
End: 2023-12-06

## 2023-12-06 DIAGNOSIS — D64.9 ANEMIA, UNSPECIFIED TYPE: ICD-10-CM

## 2023-12-06 DIAGNOSIS — D64.9 ANEMIA DUE TO UNKNOWN MECHANISM: ICD-10-CM

## 2023-12-06 LAB
BASOPHILS # BLD AUTO: 0.1 10E3/UL (ref 0–0.2)
BASOPHILS NFR BLD AUTO: 1 %
EOSINOPHIL # BLD AUTO: 0.4 10E3/UL (ref 0–0.7)
EOSINOPHIL NFR BLD AUTO: 5 %
ERYTHROCYTE [DISTWIDTH] IN BLOOD BY AUTOMATED COUNT: 15.6 % (ref 10–15)
HCT VFR BLD AUTO: 31.2 % (ref 40–53)
HGB BLD-MCNC: 10.7 G/DL (ref 13.3–17.7)
IMM GRANULOCYTES # BLD: 0.1 10E3/UL
IMM GRANULOCYTES NFR BLD: 1 %
LYMPHOCYTES # BLD AUTO: 1.5 10E3/UL (ref 0.8–5.3)
LYMPHOCYTES NFR BLD AUTO: 18 %
MCH RBC QN AUTO: 33.4 PG (ref 26.5–33)
MCHC RBC AUTO-ENTMCNC: 34.3 G/DL (ref 31.5–36.5)
MCV RBC AUTO: 98 FL (ref 78–100)
MONOCYTES # BLD AUTO: 0.8 10E3/UL (ref 0–1.3)
MONOCYTES NFR BLD AUTO: 11 %
NEUTROPHILS # BLD AUTO: 5.1 10E3/UL (ref 1.6–8.3)
NEUTROPHILS NFR BLD AUTO: 64 %
NRBC # BLD AUTO: 0 10E3/UL
NRBC BLD AUTO-RTO: 0 /100
PLATELET # BLD AUTO: 78 10E3/UL (ref 150–450)
RBC # BLD AUTO: 3.2 10E6/UL (ref 4.4–5.9)
WBC # BLD AUTO: 7.9 10E3/UL (ref 4–11)

## 2023-12-06 PROCEDURE — 36415 COLL VENOUS BLD VENIPUNCTURE: CPT

## 2023-12-06 PROCEDURE — 85025 COMPLETE CBC W/AUTO DIFF WBC: CPT

## 2023-12-07 ENCOUNTER — MYC MEDICAL ADVICE (OUTPATIENT)
Dept: FAMILY MEDICINE | Facility: CLINIC | Age: 53
End: 2023-12-07
Payer: COMMERCIAL

## 2023-12-07 ENCOUNTER — VIRTUAL VISIT (OUTPATIENT)
Dept: TRANSPLANT | Facility: CLINIC | Age: 53
End: 2023-12-07
Attending: PHYSICIAN ASSISTANT
Payer: COMMERCIAL

## 2023-12-07 ENCOUNTER — HOSPITAL ENCOUNTER (OUTPATIENT)
Dept: MRI IMAGING | Facility: CLINIC | Age: 53
Discharge: HOME | End: 2023-12-07
Attending: PHYSICIAN ASSISTANT | Admitting: PHYSICIAN ASSISTANT
Payer: COMMERCIAL

## 2023-12-07 DIAGNOSIS — K70.30 ALCOHOLIC CIRRHOSIS OF LIVER WITHOUT ASCITES (H): ICD-10-CM

## 2023-12-07 DIAGNOSIS — Z79.4 TYPE 2 DIABETES MELLITUS WITH HYPEROSMOLARITY WITHOUT COMA, WITH LONG-TERM CURRENT USE OF INSULIN (H): ICD-10-CM

## 2023-12-07 DIAGNOSIS — K76.6 PORTAL HYPERTENSION (H): ICD-10-CM

## 2023-12-07 DIAGNOSIS — K70.31 ALCOHOLIC CIRRHOSIS OF LIVER WITH ASCITES (H): ICD-10-CM

## 2023-12-07 DIAGNOSIS — E11.00 TYPE 2 DIABETES MELLITUS WITH HYPEROSMOLARITY WITHOUT COMA, WITH LONG-TERM CURRENT USE OF INSULIN (H): ICD-10-CM

## 2023-12-07 DIAGNOSIS — K70.30 ALCOHOLIC CIRRHOSIS OF LIVER WITHOUT ASCITES (H): Primary | ICD-10-CM

## 2023-12-07 DIAGNOSIS — E87.1 HYPONATREMIA: ICD-10-CM

## 2023-12-07 PROCEDURE — 258N000003 HC RX IP 258 OP 636: Performed by: PHYSICIAN ASSISTANT

## 2023-12-07 PROCEDURE — 99207 PR NO BILLABLE SERVICE THIS VISIT: CPT

## 2023-12-07 PROCEDURE — 255N000002 HC RX 255 OP 636: Performed by: PHYSICIAN ASSISTANT

## 2023-12-07 PROCEDURE — A9585 GADOBUTROL INJECTION: HCPCS | Performed by: PHYSICIAN ASSISTANT

## 2023-12-07 PROCEDURE — 74183 MRI ABD W/O CNTR FLWD CNTR: CPT

## 2023-12-07 RX ORDER — GADOBUTROL 604.72 MG/ML
9.5 INJECTION INTRAVENOUS ONCE
Status: COMPLETED | OUTPATIENT
Start: 2023-12-07 | End: 2023-12-07

## 2023-12-07 RX ADMIN — GADOBUTROL 9.5 ML: 604.72 INJECTION INTRAVENOUS at 16:31

## 2023-12-07 RX ADMIN — SODIUM CHLORIDE 50 ML: 9 INJECTION, SOLUTION INTRAVENOUS at 16:28

## 2023-12-07 NOTE — PROGRESS NOTES
Psychosocial Assessment for Liver Transplant Evaluation  This interview was conducted by phone.  Mary and his wife Adina were both present for this interview.  Living Situation: [facility] [place].  They have lived at this residence for the past sixteen years, and their mortgage is affordable.  Mary's wife assists with medication management.  Mayr is independent with his personal cares, driving and ambulation.  He denies any recent falls.  If Mary could not drive to an appointment his wife Adina or father Cristofer could provide transportation.  Education/Employment:  Mary graduated high school. He is not a [place]. He last worked the end of July of this year as a .  He worked in this capacity for five years.    Financial /Income: Mary is receiving long-term disability benefits through [employer].  Adina has income from her full-time employment.  I provided education about applying for SSDI benefits.  Mary and Adina plan to apply online together.  Health Insurance:  Mary has [employer] health insurance, through Adina's employer.  He has a $6500 annual maximum out of pocket which they state is affordable.  This writer talked with Mary and his wife about the financial risks of transplant, particularly about the high cost of transplant related medications and the importance of maintaining adequate health insurance coverage.    Family/Social Support: Mary and his wife Adina have been  for nine years and together for sixteen years. Adina works full time for [employer].  She works remotely and has a lot of flexibility in her employment.  She also has intermittent FMLA.  Adina is involved and supportive, and would be Mary's primary care giver pre and post transplant.  Additional care giving could come from Mary's father Cristofer ([place], [place]) and Adina's mother ([place], [place]).  Mary has two daughters.  Summer is in the [employer] and she  currently lives in [place].  Mary does not have contact with his oldest daughter Krystina.  Mary's mother is .  He has a brother and sister who live locally, though Mary reports they would not be involved in his care giving.  This writer stressed the importance of having a stable and involved support network before and after transplant.  Provided Mary and his wife with education about the relationship between a stable support system and better surgical and post-transplant outcomes compared to patients with a limited support system.    Chemical Dependency:  Mary currently chews tobacco, approximately one tin every three days.  I have encouraged Mary to work on a plan to quit chewing tobacco.  I encouraged Mary to consider medication by talking with his primary doctor, or using resources available at [facility].  Mary and his wife report quitting chewing may be difficult for him, especially after quitting drinking.    Mary reports using marijuana from the ages of [age].  He denies any other illicit drug use.     Mary has a significant alcohol use history which includes two DUIs (approximately thirty-five years ago). Mary has abstained from alcohol since 2023.  Prior to this he was consuming a half bottle (1.75L) of vodka six days of the week.  Mary estimates his consumption was this heavy for five or six years, and prior to this he was consuming a lesser amount (1/4 bottle-1.75L bottle, 6 days per week).  Mary and his wife attribute the increase to Mary's difficulty sleeping (including nightmares, no trauma history per his report), and unique work schedule that allowed him to begin drinking early in the day at times.  Mary has no history of substance use disorder treatment.  Mental Health: Mary denies any mental health history.  He denies any history of suicidal ideation or hospitalization for mental health treatment.  Adjustment to Illness:  Mary has experienced many losses with the  decline in his health.  He does report some improvement in symptoms but is still unsure if/when he could return to working.  Mary wants to be considered for liver transplantation.  This writer provided Mary and his wife with supportive counseling throughout this interview.  This writer also encouraged Mary and Adina  to attend the liver transplant support group for additional support and encouragement.   Impression/Recommendations:   Mary and Adina verbalize understanding the psychosocial risks of transplant and teaching provided during this evaluation.  The Caregiver agreement for Liver Transplantation was discussed today (copy mailed to patient).  Adina would be his primary care giver, and additional care giving would come from Mary's father Cristofer and Adina's mother.  Mary has been sober from alcohol since June 25, 2023(five months).  He does not currently meet sobriety criteria recommended for listing.  Mary has no history of treatment for alcohol, and he has a long standing history of heavy alcohol consumption.  Mary completed a substance use assessment at [facility] on 11/9/23, and he was recommended to begin individual therapy.  Mary established care with a therapy provider at [facility] just last week.  He plans to have weekly sessions.  I asked Mary to sign a Release of Information with his provider, and request the provider fax updates.  My fax number was provided.  I will follow Mary's progress with this.  Mary also chews a tin of tobacco every three days, and he was advised to work on a plan to quit.    Mary has adequate finances and health insurance for transplant.  This writer will remain available to assist patient throughout the evaluation process and will follow patient through transplant if he is listed.  It was a pleasure to evaluate this patient for liver transplant.   Teaching completed during assessment:  1.     Housing and relocation needs post transplant.  2.      Caregiver needs post transplant.  3.     Financial issues related to transplant.  4.     Risks of alcohol use post transplant.  5.     Common psychosocial stressors pre/post transplant.          6.     Liver Transplant support group availability.          7.     Advanced Health Care Directive-education provided, form mailed to patient.             Psychosocial Risks of Transplant Reviewed:  1.     Increased stress related to your emotional, family, social, employment, or   financial situation.  2.     Affect on work and/or disability benefits.  3.     Affect on future health and life insurance.  4.     Transplant outcome expectations may not be met.  5.     Mental Health risks: anxiety, depression, PTSD, guilt, grief and chronic fatigue.     CAPRI Brock, LICSW

## 2023-12-07 NOTE — TELEPHONE ENCOUNTER
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 Jimmie Hoyt    Plan: full evaluation with Dr. Salgado on 12/19/23    Patient did virtual visit with Tee Shepherd on 12/7/23 (see note for details)    Patient has followed with Sher for a couple visits now, last on 11/22/23. Please see notes for medical details, also copied below.     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 No alcohol since June 2023. MELD 3.0 in September was 30. No recent hepatic panel, but continues to have elevated INR of 2.29. If he continues to have significant liver dysfunction, discussing moving forward with liver transplant evaluation.      # Assessment of liver function: Hepatic panel, BMP, INR next week      # Repeat IgG (IgG previously 2330, SMA 70), IgG sub-class     # Based on any on-going elevations in transaminases and bilirubin, IgG levels may recommend moving forward with MRI/MRCP (rapid onset DM requiring insulin) vs liver biopsy      # HCC screening, up to date:   - Due in March 2024     # Variceal screening/history of G1 EV/GOV Type1, up to date:   - Repeat July 2024 or sooner as indicated     # Lower extremity edema, managed:   # Pleural effursions, improved:   # Ascites, improved:   -Continue torsemide 10 mg daily   -Continue 20 mEq potassium chloride daily     # Anemia, multi-factorial (haptaglobin low, LDH high), improved, now up to 11.5   - Trend hemoglobin     # Hepatic Encephalopathy, fairly well controlled:   - Continue lactulose daily (adjust dose to 2-3 bowel movements a day)      # Hyponatremia:  - Needs improved blood glucose control, prescribed short acting insulin yesterday   - Continue 2000 mg sodium diet      # DM type 2, CLAY:   - Continue optimization of blood glucose   - High risk  for dehydration      # History of alcohol abuse:  - No alcohol since June 25, 2023.  Congratulated on his efforts with maintaining sobriety   - Has completed Rule 25, with last one through [place] recommending individual behavior   - Continue Absolute sobriety from alcohol     Sher Dutton PA-C   [facility]     HPI  Mary Lopez is a 53 year old male presenting for follow-up.      ETOH Cirrhosis  Complicated by:  - No history of ascites   - History of GI bleed.   EGD: (7/26/2023) grade 1 esophageal varices, moderate PHG with oozing and type II GOV 2   HCC: US abdomen liver Doppler in September 2023     Patient was last seen by me on 9/27/2023. Was diagnosed with CLAY and started on insulin and has been working with DM Educator. PCP orders short acting insulin yesterday, hasn't started.      Per chart review, weight is down about 50 lb in the last 2-3 months, primarily fluid weight. No fluid restriction lately.      Eating is more sporatic (trying to do two bigger meals). 1/2 sandwich w/ boost      Not much fluid in legs. Legs feel a little heavier. No numbness or tingling in legs.   Will go out to store for shopping. No problems with bloating in abdomen.      Overall confusion//mental fuzziness improving.      Will intermittently have blood with bowel movements,      Patient also denies melena, or hematemesis.  Patient denies  fevers, sweats or chills.     No CD treatment in the past. Denies cravings. Last drank June 25th, 2023. Negative Peth 7/26/2023. Has contact information to start therapy appointment, not scheduled yet.

## 2023-12-07 NOTE — LETTER
12/7/2023         RE: Mary Anayarowan Lopez  [address] [place] [address]        Dear Colleague,    Thank you for referring your patient, Mary Lopez, to the [facility]. Please see a copy of my visit note below.    Psychosocial Assessment for Liver Transplant Evaluation  This interview was conducted by phone.  Mary and his wife Adina were both present for this interview.  Living Situation: [facility] [place].  They have lived at this residence for the past sixteen years, and their mortgage is affordable.  Mary's wife assists with medication management.  Mary is independent with his personal cares, driving and ambulation.  He denies any recent falls.  If Mary could not drive to an appointment his wife Adina or father Cristofer could provide transportation.  Education/Employment:  Mary graduated high school. He is not a [place]. He last worked the end of July of this year as a .  He worked in this capacity for five years.    Financial /Income: Mary is receiving long-term disability benefits through [employer].  Adina has income from her full-time employment.  I provided education about applying for SSDI benefits.  Mary and Adina plan to apply online together.  Health Insurance:  Mary has [employer] health insurance, through Adina's employer.  He has a $6500 annual maximum out of pocket which they state is affordable.  This writer talked with Mary and his wife about the financial risks of transplant, particularly about the high cost of transplant related medications and the importance of maintaining adequate health insurance coverage.    Family/Social Support: Mary and his wife Adina have been  for nine years and together for sixteen years. Adina works full time for [employer].  She works remotely and has a lot of flexibility in her employment.  She also has intermittent FMLA.  Adina  is involved and supportive, and would be Mary's primary care giver pre and post transplant.  Additional care giving could come from Mary's father Cristofer ([place], [place]) and Adina's mother ([place], [place]).  Mary has two daughters.  Summer is in the [employer] and she currently lives in [place].  Mary does not have contact with his oldest daughter Krystina.  Mary's mother is .  He has a brother and sister who live locally, though Mary reports they would not be involved in his care giving.  This writer stressed the importance of having a stable and involved support network before and after transplant.  Provided Mary and his wife with education about the relationship between a stable support system and better surgical and post-transplant outcomes compared to patients with a limited support system.    Chemical Dependency:  Mary currently chews tobacco, approximately one tin every three days.  I have encouraged Mary to work on a plan to quit chewing tobacco.  I encouraged Mary to consider medication by talking with his primary doctor, or using resources available at [facility].  Mary and his wife report quitting chewing may be difficult for him, especially after quitting drinking.    Mary reports using marijuana from the ages of [age].  He denies any other illicit drug use.     Mary has a significant alcohol use history which includes two DUIs (approximately thirty-five years ago). Mary has abstained from alcohol since 2023.  Prior to this he was consuming a half bottle (1.75L) of vodka six days of the week.  Mary estimates his consumption was this heavy for five or six years, and prior to this he was consuming a lesser amount (1/4 bottle-1.75L bottle, 6 days per week).  Mary and his wife attribute the increase to Mary's difficulty sleeping (including nightmares, no trauma history per his report), and unique work schedule that allowed him to begin drinking early in the day at times.  Mary  has no history of substance use disorder treatment.  Mental Health: Mary denies any mental health history.  He denies any history of suicidal ideation or hospitalization for mental health treatment.  Adjustment to Illness:  Mary has experienced many losses with the decline in his health.  He does report some improvement in symptoms but is still unsure if/when he could return to working.  Mary wants to be considered for liver transplantation.  This writer provided Mary and his wife with supportive counseling throughout this interview.  This writer also encouraged Mary and Adina  to attend the liver transplant support group for additional support and encouragement.   Impression/Recommendations:   Mary and Adina verbalize understanding the psychosocial risks of transplant and teaching provided during this evaluation.  The Caregiver agreement for Liver Transplantation was discussed today (copy mailed to patient).  Aidna would be his primary care giver, and additional care giving would come from Mary's father Cristofer and Adina's mother.  Mary has been sober from alcohol since June 25, 2023(five months).  He does not currently meet sobriety criteria recommended for listing.  Mary has no history of treatment for alcohol, and he has a long standing history of heavy alcohol consumption.  Mary completed a substance use assessment at [facility] on 11/9/23, and he was recommended to begin individual therapy.  Mary established care with a therapy provider at [facility] just last week.  He plans to have weekly sessions.  I asked Mary to sign a Release of Information with his provider, and request the provider fax updates.  My fax number was provided.  I will follow Mary's progress with this.  Mary also chews a tin of tobacco every three days, and he was advised to work on a plan to quit.    Mary has adequate finances and health insurance for transplant.  This writer will remain available to assist  patient throughout the evaluation process and will follow patient through transplant if he is listed.  It was a pleasure to evaluate this patient for liver transplant.   Teaching completed during assessment:  1.     Housing and relocation needs post transplant.  2.     Caregiver needs post transplant.  3.     Financial issues related to transplant.  4.     Risks of alcohol use post transplant.  5.     Common psychosocial stressors pre/post transplant.          6.     Liver Transplant support group availability.          7.     Advanced Health Care Directive-education provided, form mailed to patient.             Psychosocial Risks of Transplant Reviewed:  1.     Increased stress related to your emotional, family, social, employment, or   financial situation.  2.     Affect on work and/or disability benefits.  3.     Affect on future health and life insurance.  4.     Transplant outcome expectations may not be met.  5.     Mental Health risks: anxiety, depression, PTSD, guilt, grief and chronic fatigue.     CAPRI Brock, [facility]      Again, thank you for allowing me to participate in the care of your patient.        Sincerely,        CAPRI Estrada

## 2023-12-08 NOTE — TELEPHONE ENCOUNTER
RECORDS RECEIVED FROM:    DATE RECEIVED:    NOTES STATUS DETAILS   OFFICE NOTE from referring provider  Internal SOT   OFFICE NOTE from other cardiologists  N/A    RECORDS from hospital/ED N/A    MEDICATION LIST Internal    GENERAL CARDIO RECORDS   (ALL APPOINTMENT TYPES)     LABS (CBC,BMP,CMP, TSH) Internal    EKG (STRIPS & REPORTS) Internal 12-19-23 Scheduled   MONITORS (STRIPS & REPORTS) N/A    ECHOS (IMAGES AND REPORTS) Internal 12-19-23 Scheduled   STRESS TESTS (IMAGES AND REPORTS) N/A    cMRI (IMAGES AND REPORTS) N/A    CT/CTA (IMAGES AND REPORTS) In process 2-13-23 CTA scheduled after appt

## 2023-12-10 ENCOUNTER — MYC MEDICAL ADVICE (OUTPATIENT)
Dept: FAMILY MEDICINE | Facility: CLINIC | Age: 53
End: 2023-12-10
Payer: COMMERCIAL

## 2023-12-10 DIAGNOSIS — Z79.4 TYPE 2 DIABETES MELLITUS WITH HYPEROSMOLARITY WITHOUT COMA, WITH LONG-TERM CURRENT USE OF INSULIN (H): ICD-10-CM

## 2023-12-10 DIAGNOSIS — K70.30 ALCOHOLIC CIRRHOSIS OF LIVER WITHOUT ASCITES (H): Primary | ICD-10-CM

## 2023-12-10 DIAGNOSIS — Z14.8 ALPHA-1-ANTITRYPSIN DEFICIENCY CARRIER: ICD-10-CM

## 2023-12-10 DIAGNOSIS — E11.00 TYPE 2 DIABETES MELLITUS WITH HYPEROSMOLARITY WITHOUT COMA, WITH LONG-TERM CURRENT USE OF INSULIN (H): ICD-10-CM

## 2023-12-11 NOTE — TELEPHONE ENCOUNTER
Routing to provider to review. Please advise if you would like patient to schedule virtual visit or if same day is appropriate.     Celsa Jiménez, EDDAN, RN   [facility]

## 2023-12-12 ENCOUNTER — LAB (OUTPATIENT)
Dept: LAB | Facility: CLINIC | Age: 53
End: 2023-12-12
Payer: COMMERCIAL

## 2023-12-12 DIAGNOSIS — D64.9 ANEMIA DUE TO UNKNOWN MECHANISM: ICD-10-CM

## 2023-12-12 DIAGNOSIS — K70.30 ALCOHOLIC CIRRHOSIS OF LIVER WITHOUT ASCITES (H): ICD-10-CM

## 2023-12-12 DIAGNOSIS — D64.9 ANEMIA, UNSPECIFIED TYPE: ICD-10-CM

## 2023-12-12 DIAGNOSIS — E87.1 HYPONATREMIA: ICD-10-CM

## 2023-12-12 LAB
ANION GAP SERPL CALCULATED.3IONS-SCNC: 8 MMOL/L (ref 7–15)
BASOPHILS # BLD AUTO: 0.1 10E3/UL (ref 0–0.2)
BASOPHILS NFR BLD AUTO: 1 %
BUN SERPL-MCNC: 8.5 MG/DL (ref 6–20)
CALCIUM SERPL-MCNC: 8.6 MG/DL (ref 8.6–10)
CHLORIDE SERPL-SCNC: 93 MMOL/L (ref 98–107)
CREAT SERPL-MCNC: 0.68 MG/DL (ref 0.67–1.17)
DEPRECATED HCO3 PLAS-SCNC: 29 MMOL/L (ref 22–29)
EGFRCR SERPLBLD CKD-EPI 2021: >90 ML/MIN/1.73M2
EOSINOPHIL # BLD AUTO: 0.2 10E3/UL (ref 0–0.7)
EOSINOPHIL NFR BLD AUTO: 3 %
ERYTHROCYTE [DISTWIDTH] IN BLOOD BY AUTOMATED COUNT: 16.2 % (ref 10–15)
GLUCOSE SERPL-MCNC: 458 MG/DL (ref 70–99)
HCT VFR BLD AUTO: 29.2 % (ref 40–53)
HGB BLD-MCNC: 9.9 G/DL (ref 13.3–17.7)
IMM GRANULOCYTES # BLD: 0.1 10E3/UL
IMM GRANULOCYTES NFR BLD: 1 %
LYMPHOCYTES # BLD AUTO: 1 10E3/UL (ref 0.8–5.3)
LYMPHOCYTES NFR BLD AUTO: 14 %
MCH RBC QN AUTO: 33.6 PG (ref 26.5–33)
MCHC RBC AUTO-ENTMCNC: 33.9 G/DL (ref 31.5–36.5)
MCV RBC AUTO: 99 FL (ref 78–100)
MONOCYTES # BLD AUTO: 0.9 10E3/UL (ref 0–1.3)
MONOCYTES NFR BLD AUTO: 12 %
NEUTROPHILS # BLD AUTO: 5 10E3/UL (ref 1.6–8.3)
NEUTROPHILS NFR BLD AUTO: 69 %
NRBC # BLD AUTO: 0 10E3/UL
NRBC BLD AUTO-RTO: 0 /100
PLATELET # BLD AUTO: 82 10E3/UL (ref 150–450)
POTASSIUM SERPL-SCNC: 4.4 MMOL/L (ref 3.4–5.3)
RBC # BLD AUTO: 2.95 10E6/UL (ref 4.4–5.9)
SODIUM SERPL-SCNC: 130 MMOL/L (ref 135–145)
WBC # BLD AUTO: 7.3 10E3/UL (ref 4–11)

## 2023-12-12 PROCEDURE — 36415 COLL VENOUS BLD VENIPUNCTURE: CPT

## 2023-12-12 PROCEDURE — 85025 COMPLETE CBC W/AUTO DIFF WBC: CPT

## 2023-12-12 PROCEDURE — 80048 BASIC METABOLIC PNL TOTAL CA: CPT

## 2023-12-14 ENCOUNTER — VIRTUAL VISIT (OUTPATIENT)
Dept: EDUCATION SERVICES | Facility: CLINIC | Age: 53
End: 2023-12-14
Payer: COMMERCIAL

## 2023-12-14 ENCOUNTER — ANCILLARY PROCEDURE (OUTPATIENT)
Dept: ULTRASOUND IMAGING | Facility: CLINIC | Age: 53
End: 2023-12-14
Attending: INTERNAL MEDICINE
Payer: COMMERCIAL

## 2023-12-14 DIAGNOSIS — E11.00 TYPE 2 DIABETES MELLITUS WITH HYPEROSMOLARITY WITHOUT COMA, WITH LONG-TERM CURRENT USE OF INSULIN (H): Primary | ICD-10-CM

## 2023-12-14 DIAGNOSIS — Z79.4 TYPE 2 DIABETES MELLITUS WITH HYPEROSMOLARITY WITHOUT COMA, WITH LONG-TERM CURRENT USE OF INSULIN (H): Primary | ICD-10-CM

## 2023-12-14 DIAGNOSIS — K70.31 ALCOHOLIC CIRRHOSIS OF LIVER WITH ASCITES (H): ICD-10-CM

## 2023-12-14 DIAGNOSIS — K76.6 PORTAL HYPERTENSION (H): ICD-10-CM

## 2023-12-14 PROCEDURE — G0108 DIAB MANAGE TRN  PER INDIV: HCPCS | Mod: VID | Performed by: NUTRITIONIST

## 2023-12-14 PROCEDURE — 93975 VASCULAR STUDY: CPT | Performed by: RADIOLOGY

## 2023-12-14 PROCEDURE — 76700 US EXAM ABDOM COMPLETE: CPT | Mod: XU | Performed by: RADIOLOGY

## 2023-12-14 NOTE — PROGRESS NOTES
Diabetes Self-Management Education & Support    Mary Valadezensen presents today for education related to Type 2 diabetes    Patient is being treated with:  insulin  He is accompanied by spouse    Year of diagnosis: 2023  Referring provider:  Lai  Living Situation: with spouse   Employment: n/a    PATIENT CONCERNS RELATED TO DIABETES SELF MANAGEMENT: Last few days glucose is dropping at night.      ASSESSMENT:    Taking Medication:     Current Diabetes Management per Patient:  Taking diabetes medications? yes:     Diabetes Medication(s)       Biguanides       metFORMIN (GLUCOPHAGE XR) 500 MG 24 hr tablet    Take 1 tablet (500 mg) by mouth daily (with dinner)      Insulin       insulin degludec (TRESIBA FLEXTOUCH) 100 UNIT/ML pen    Inject 40-50 Units Subcutaneous daily Inject 40 units daily.  Increase by 1 unit daily until fasting BG most often <150 as long as this does not lead to overnight or early morning low BG <70.     Insulin Lispro (HUMALOG KWIKPEN) 200 UNIT/ML soln    Inject 10-18 Units Subcutaneous 4 times daily (with meals and nightly) Give 10 units plus sliding scale to correct any premeal blood sugars >175.          Taking 51 units of Tresiba now at night. Has been at that dose for about three days.  Humalog 10 dose plus correction scale 1/35/175/210    Monitoring                  Patient's most recent   Lab Results   Component Value Date    A1C 8.1 10/31/2023        Healthy Eating:   cereal (sugary) for breakfast  Leftovers or cereal for lunch  Goulash yesterday for dinner  Or fruit cup/greek yogurt/cookie    Diet is pretty high carb  Large portions of cereal     Problem Solving:      Patient is at risk of hypoglycemia?: YES  Hospitalizations for hyper or hypoglycemia: No      EDUCATION and INSTRUCTION PROVIDED AT THIS VISIT:    Patient and spouse have increase tresiba to 51 units the past three days. Glucose is now dropping significantly overnight and patient is waking with hypoglycemia. It  sounds like it is difficult for him to identify symptoms of hypo. He may be on too high a dose of tresiba now, and then glucose spikes significantly after he eats and stays high. Of note, last humalog dose is given around 7:30pm, so drop overnight is likely not related to the humalog dosing. Will lower tresiba dose and increase humalog dose. The goal would be to give more humalog up front to help reduce need for correction.    Encouraged patient to balance meals with carb/pro/fat/veg and avoid large portions of sugary cereal.   Patient-stated goal written and given to Mary Lopez.  Verbalized and demonstrated understanding of instructions.     PLAN:  Reduce Tresiba dose by 10% to 46 units daily (was 51)  Increase humalog dose to 12 units per meal (was 10 units)  Continue same correction scale per Suzanne THOMPSON    FOLLOW-UP:    Via video in two weeks    Time spent with patient at today's visit was 43 minutes.      Any diabetes medication dose changes were made via the CDE Protocol and Collaborative Practice Agreement with [place] and  [facility].  A copy of this encounter was provided to patient's referring provider.

## 2023-12-14 NOTE — NURSING NOTE
Is the patient currently in the state of [place]? YES    Visit mode:VIDEO    If the visit is dropped, the patient can be reconnected by: VIDEO VISIT: Text to cell phone:   Telephone Information:   Mobile [phone]       Will anyone else be joining the visit? YES: How would they like to receive their invitation? Send to e-mail: PT's wife will be on camera  (If patient encounters technical issues they should call [phone]    How would you like to obtain your AVS? MyChart    Are changes needed to the allergy or medication list? No    Reason for visit: RECHECK    Casper HOOVERF

## 2023-12-14 NOTE — PROGRESS NOTES
Virtual Visit Details    Type of service:  Video Visit     Originating Location (pt. Location): Home    Distant Location (provider location):  Off-site  Platform used for Video Visit: Yenifer

## 2023-12-15 NOTE — TELEPHONE ENCOUNTER
I sent the patient a message to increase torsemide to 20 mg twice a day for 2 days and then down to 20 mg once a day thereafter.

## 2023-12-18 LAB
ABO/RH(D): NORMAL
ANTIBODY SCREEN: NEGATIVE
SPECIMEN EXPIRATION DATE: NORMAL

## 2023-12-18 PROCEDURE — 99358 PROLONG SERVICE W/O CONTACT: CPT | Performed by: INTERNAL MEDICINE

## 2023-12-18 NOTE — PROGRESS NOTES
[facility] SOLID ORGAN TRANSPLANT  OUTPATIENT MNT: LIVER TRANSPLANT EVALUATION    Current BMI: 34.08 (HT 69 in,  lbs/105 kg)  BMI is within recommendation of <45 for liver transplant    Frailty Assessment-- Frail (4/5 points)- unintentional wt loss, reported exhaustion, reduced , low activity level    Reference:  Score of 0-2 = Not Frail  Score of 3-5 = Frail     FraiLT-- Frail- unable to complete tandem balance position   [email or web address]    Recommendations:  - PT  - Needs more protein in diet- suggested LOW CARB protein shake  - Continue working on glycemic control; avoid high sugar foods      TIME SPENT: 30 minutes  VISIT TYPE: follow up (meets with CDE)  REFERRING PHYSICIAN: Diego   PT ACCOMPANIED BY: his wife     History of previous txp: none     Has Equate nutritional shake- 350 huber, 50  cho    NUTRITION ASSESSMENT  H/o DM II (new dx in the past few months), etoh cirrhosis.   Wife reports he usually wears a CGM, but it fell off prematurely and they did not replace it due to unknown nature of today's testing. He reports working with CDE and is on long and short acting insulin. He does not carb count, but is prescribed a set dose of fast acting insulin + correction pending BG levels. Wife reports recently BG up to 300-400. Last A1c 8.1 10/31/23.     - Appetite: fluctuates based on the day   - Food allergies/intolerances: beans (anaphylaxis), fish (itchy mouth), pineapple, lactose intolerant   - Meal prep & grocery shopping: wife does majority   - Previous RD education: yes  - Issues chewing or swallowing: no   - N/V/D/C: N/V every few weeks  - Food access concerns: not asked     Vitamins, Supplements, Pertinent Meds: MVI, potassium chloride   Herbal Medicines/Supplements: none   Protein supplement: irregularly; wife brought Equate nutritional shake to clinic- 350 calories, 13 g protein, 50 g cho    Edema: + ascites (no para), + DANILO     Weight hx: prior  lbs  "  Muscle loss (pt reports mild, wife reports mod/severe)  Wt Readings from Last 10 Encounters:   12/19/23 104.7 kg (230 lb 12.8 oz)   12/01/23 95.3 kg (210 lb)   11/28/23 95.3 kg (210 lb)   11/22/23 93.4 kg (206 lb)   10/16/23 99.8 kg (220 lb)   09/14/23 94 kg (207 lb 3.2 oz)   08/18/23 113.2 kg (249 lb 8 oz)   08/10/23 119.3 kg (263 lb)   08/03/23 117.8 kg (259 lb 12.8 oz)   07/25/23 107.1 kg (236 lb 3.2 oz)     PHYSICAL ACTIVITY   Does own showering, dressing - gets tired out easily  Prior to illness he worked an active job as - last worked in July 2 falls in the past, no cane/walker  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, 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  Could walk 1 block w/o stopping, but would walk very slowly     DIET RECALL  Breakfast Cereal    Lunch Snacks per below    Dinner Goulash; hamburgers   Snacks Fruit  (>3 svgs/day), candy, cookies    Beverages Vitamin water, water, some juice (12 oz/day), pop \"if around\", Gatorade (1 bottle) regular, Lactaid protein fortified milk (24 oz/day)   Dining out 3x/week- [employer], [employer] (cold cut combo 6\")     NUTRITION DIAGNOSIS   Inadequate protein intake r/t increased needs with liver disease AEB diet recall shows affinity for higher carb food, frail status including low muscle mass noted on  tool, moderate muscle loss noted by pt/wife.     NUTRITION INTERVENTION   Nutrition education provided:  Discussed sodium intake (low sodium foods and drinks, seasoning food without salt and tips for low sodium diet). Reviewed that hyponatremia from blood work is NOT an indicator to consume more salt.     Reviewed adequate protein intake. Encouraged receiving protein from both animal and plant based sources. Consider high protein, low sugar nutrition drink at least 1/day. Reviewed importance of eating protein ideally at each meal time + with all snacks to help with high protein needs for liver disease and for balanced BG.   Strongly recommended limiting fast acting carbs or carbs only " for meals, at least until BG improve.   Opt for protein-fortified cereal/oatmeal, eggs in the AM, Greek yogurt, etc. If snacking on fruit, pair it with a protein/fat to help blunt BG spike, such as nuts/nut butter, cheese stick, etc.   Would avoid all sugary drinks- vitamin water, Gatorade, juice- opt for sugar free versions or just stick to water.     Reviewed post txp diet guidelines in brief (will review in further detail post txp):  (1) Review of proper food safety measures d/t immunosuppressant therapy post-op and increased risk for food-borne illness    (2) Avoid the following post txp d/t risk for rejection, unknown effects on the organs, and/or potential interactions with immunosuppressants:  - Herbal, Chinese, holistic, chiropractic, natural, alternative medicines and supplements  - Detoxes and cleanses  - Weight loss pills  - Protein powders or other products with extracts or herbs (ie green tea extract)    (3) Med regimen and possible side effects    Patient Understanding: Pt verbalized understanding of education provided.  Expected Engagement: Fair  Follow-Up Plans: PRN     NUTRITION GOALS   Include 1 protein drink/day at minimum    Mitali Ly, RD, LD, CCTD

## 2023-12-19 ENCOUNTER — OFFICE VISIT (OUTPATIENT)
Dept: TRANSPLANT | Facility: CLINIC | Age: 53
End: 2023-12-19
Attending: INTERNAL MEDICINE
Payer: COMMERCIAL

## 2023-12-19 ENCOUNTER — ANCILLARY PROCEDURE (OUTPATIENT)
Dept: CARDIOLOGY | Facility: CLINIC | Age: 53
End: 2023-12-19
Attending: INTERNAL MEDICINE
Payer: COMMERCIAL

## 2023-12-19 ENCOUNTER — LAB (OUTPATIENT)
Dept: LAB | Facility: CLINIC | Age: 53
End: 2023-12-19
Attending: INTERNAL MEDICINE
Payer: COMMERCIAL

## 2023-12-19 ENCOUNTER — MYC MEDICAL ADVICE (OUTPATIENT)
Dept: FAMILY MEDICINE | Facility: CLINIC | Age: 53
End: 2023-12-19

## 2023-12-19 ENCOUNTER — COMMITTEE REVIEW (OUTPATIENT)
Dept: TRANSPLANT | Facility: CLINIC | Age: 53
End: 2023-12-19

## 2023-12-19 VITALS
HEIGHT: 69 IN | HEART RATE: 94 BPM | SYSTOLIC BLOOD PRESSURE: 101 MMHG | OXYGEN SATURATION: 95 % | BODY MASS INDEX: 34.18 KG/M2 | DIASTOLIC BLOOD PRESSURE: 66 MMHG | WEIGHT: 230.8 LBS

## 2023-12-19 DIAGNOSIS — R54 FRAILTY: ICD-10-CM

## 2023-12-19 DIAGNOSIS — Z76.82 ORGAN TRANSPLANT CANDIDATE: Primary | ICD-10-CM

## 2023-12-19 DIAGNOSIS — K70.31 ALCOHOLIC CIRRHOSIS OF LIVER WITH ASCITES (H): ICD-10-CM

## 2023-12-19 DIAGNOSIS — K76.6 PORTAL HYPERTENSION (H): ICD-10-CM

## 2023-12-19 DIAGNOSIS — D64.9 ANEMIA, UNSPECIFIED TYPE: ICD-10-CM

## 2023-12-19 DIAGNOSIS — D64.9 ANEMIA DUE TO UNKNOWN MECHANISM: ICD-10-CM

## 2023-12-19 DIAGNOSIS — K70.30 ALCOHOLIC CIRRHOSIS OF LIVER WITHOUT ASCITES (H): ICD-10-CM

## 2023-12-19 DIAGNOSIS — Z79.4 TYPE 2 DIABETES MELLITUS WITH HYPEROSMOLARITY WITHOUT COMA, WITH LONG-TERM CURRENT USE OF INSULIN (H): ICD-10-CM

## 2023-12-19 DIAGNOSIS — E11.65 TYPE 2 DIABETES MELLITUS WITH HYPERGLYCEMIA, WITH LONG-TERM CURRENT USE OF INSULIN (H): ICD-10-CM

## 2023-12-19 DIAGNOSIS — S49.91XA SHOULDER INJURY, RIGHT, INITIAL ENCOUNTER: Primary | ICD-10-CM

## 2023-12-19 DIAGNOSIS — E87.1 HYPONATREMIA: ICD-10-CM

## 2023-12-19 DIAGNOSIS — Z14.8 ALPHA-1-ANTITRYPSIN DEFICIENCY CARRIER: ICD-10-CM

## 2023-12-19 DIAGNOSIS — Z76.82 AWAITING ORGAN TRANSPLANT STATUS: Primary | ICD-10-CM

## 2023-12-19 DIAGNOSIS — E11.00 TYPE 2 DIABETES MELLITUS WITH HYPEROSMOLARITY WITHOUT COMA, WITH LONG-TERM CURRENT USE OF INSULIN (H): ICD-10-CM

## 2023-12-19 DIAGNOSIS — K76.82 HEPATIC ENCEPHALOPATHY (H): Primary | ICD-10-CM

## 2023-12-19 DIAGNOSIS — K70.31 ALCOHOLIC CIRRHOSIS OF LIVER WITH ASCITES (H): Primary | ICD-10-CM

## 2023-12-19 DIAGNOSIS — Z79.4 TYPE 2 DIABETES MELLITUS WITH HYPERGLYCEMIA, WITH LONG-TERM CURRENT USE OF INSULIN (H): ICD-10-CM

## 2023-12-19 LAB
A1 AB TITR SERPL: >256 {TITER}
A1 AB TITR SERPL: >256 {TITER}
ALBUMIN MFR UR ELPH: 19 MG/DL
ALBUMIN SERPL BCG-MCNC: 2.4 G/DL (ref 3.5–5.2)
ALP SERPL-CCNC: 191 U/L (ref 40–150)
ALT SERPL W P-5'-P-CCNC: 40 U/L (ref 0–70)
ANION GAP SERPL CALCULATED.3IONS-SCNC: 7 MMOL/L (ref 7–15)
ANTIBODY TITER IGM SCREEN: NEGATIVE
AST SERPL W P-5'-P-CCNC: 119 U/L (ref 0–45)
B IGG TITR SERPL: >256 {TITER}
B IGM TITR SERPL: >256 {TITER}
BASOPHILS # BLD AUTO: 0.1 10E3/UL (ref 0–0.2)
BASOPHILS NFR BLD AUTO: 1 %
BILIRUB DIRECT SERPL-MCNC: 4.83 MG/DL (ref 0–0.3)
BILIRUB SERPL-MCNC: 9.8 MG/DL
BUN SERPL-MCNC: 11.1 MG/DL (ref 6–20)
CALCIUM SERPL-MCNC: 8.5 MG/DL (ref 8.6–10)
CHLORIDE SERPL-SCNC: 94 MMOL/L (ref 98–107)
CMV IGG SERPL IA-ACNC: <0.2 U/ML
CMV IGG SERPL IA-ACNC: NORMAL
CREAT SERPL-MCNC: 0.78 MG/DL (ref 0.67–1.17)
CREAT UR-MCNC: 143 MG/DL
DEPRECATED HCO3 PLAS-SCNC: 30 MMOL/L (ref 22–29)
EBV VCA IGG SER IA-ACNC: >750 U/ML
EBV VCA IGG SER IA-ACNC: POSITIVE
EGFRCR SERPLBLD CKD-EPI 2021: >90 ML/MIN/1.73M2
EOSINOPHIL # BLD AUTO: 0.1 10E3/UL (ref 0–0.7)
EOSINOPHIL NFR BLD AUTO: 1 %
ERYTHROCYTE [DISTWIDTH] IN BLOOD BY AUTOMATED COUNT: 16.7 % (ref 10–15)
FERRITIN SERPL-MCNC: 2948 NG/ML (ref 31–409)
GLUCOSE SERPL-MCNC: 217 MG/DL (ref 70–99)
HCT VFR BLD AUTO: 25.5 % (ref 40–53)
HGB BLD-MCNC: 8.8 G/DL (ref 13.3–17.7)
HIV 1+2 AB+HIV1 P24 AG SERPL QL IA: NONREACTIVE
IMM GRANULOCYTES # BLD: 0.1 10E3/UL
IMM GRANULOCYTES NFR BLD: 1 %
INR PPP: 2.97 (ref 0.85–1.15)
IRON BINDING CAPACITY (ROCHE): NORMAL
IRON SATN MFR SERPL: NORMAL %
IRON SERPL-MCNC: 135 UG/DL (ref 61–157)
LVEF ECHO: NORMAL
LYMPHOCYTES # BLD AUTO: 0.6 10E3/UL (ref 0.8–5.3)
LYMPHOCYTES NFR BLD AUTO: 12 %
MCH RBC QN AUTO: 33.8 PG (ref 26.5–33)
MCHC RBC AUTO-ENTMCNC: 34.5 G/DL (ref 31.5–36.5)
MCV RBC AUTO: 98 FL (ref 78–100)
MONOCYTES # BLD AUTO: 0.6 10E3/UL (ref 0–1.3)
MONOCYTES NFR BLD AUTO: 12 %
NEUTROPHILS # BLD AUTO: 3.6 10E3/UL (ref 1.6–8.3)
NEUTROPHILS NFR BLD AUTO: 73 %
NRBC # BLD AUTO: 0 10E3/UL
NRBC BLD AUTO-RTO: 0 /100
PHOSPHATE SERPL-MCNC: 2.9 MG/DL (ref 2.5–4.5)
PLATELET # BLD AUTO: 68 10E3/UL (ref 150–450)
POTASSIUM SERPL-SCNC: 4 MMOL/L (ref 3.4–5.3)
PROT SERPL-MCNC: 6.7 G/DL (ref 6.4–8.3)
PROT/CREAT 24H UR: 0.13 MG/MG CR (ref 0–0.2)
PSA SERPL DL<=0.01 NG/ML-MCNC: 0.07 NG/ML (ref 0–3.5)
RBC # BLD AUTO: 2.6 10E6/UL (ref 4.4–5.9)
SODIUM SERPL-SCNC: 131 MMOL/L (ref 135–145)
SPECIMEN EXPIRATION DATE: NORMAL
T PALLIDUM AB SER QL: NONREACTIVE
VIT D+METAB SERPL-MCNC: 26 NG/ML (ref 20–50)
WBC # BLD AUTO: 4.9 10E3/UL (ref 4–11)

## 2023-12-19 PROCEDURE — 80053 COMPREHEN METABOLIC PANEL: CPT | Performed by: PATHOLOGY

## 2023-12-19 PROCEDURE — 99207 PR NO CHARGE COORDINATED CARE PS: CPT

## 2023-12-19 PROCEDURE — 85610 PROTHROMBIN TIME: CPT | Performed by: PATHOLOGY

## 2023-12-19 PROCEDURE — 86886 COOMBS TEST INDIRECT TITER: CPT | Performed by: INTERNAL MEDICINE

## 2023-12-19 PROCEDURE — 99213 OFFICE O/P EST LOW 20 MIN: CPT | Performed by: TRANSPLANT SURGERY

## 2023-12-19 PROCEDURE — 93306 TTE W/DOPPLER COMPLETE: CPT | Performed by: INTERNAL MEDICINE

## 2023-12-19 PROCEDURE — 99205 OFFICE O/P NEW HI 60 MIN: CPT | Performed by: TRANSPLANT SURGERY

## 2023-12-19 PROCEDURE — 83540 ASSAY OF IRON: CPT | Performed by: PATHOLOGY

## 2023-12-19 PROCEDURE — 86780 TREPONEMA PALLIDUM: CPT | Performed by: INTERNAL MEDICINE

## 2023-12-19 PROCEDURE — G0103 PSA SCREENING: HCPCS | Performed by: PATHOLOGY

## 2023-12-19 PROCEDURE — 86481 TB AG RESPONSE T-CELL SUSP: CPT | Performed by: INTERNAL MEDICINE

## 2023-12-19 PROCEDURE — 85025 COMPLETE CBC W/AUTO DIFF WBC: CPT | Performed by: PATHOLOGY

## 2023-12-19 PROCEDURE — 99205 OFFICE O/P NEW HI 60 MIN: CPT | Performed by: INTERNAL MEDICINE

## 2023-12-19 PROCEDURE — 86901 BLOOD TYPING SEROLOGIC RH(D): CPT | Performed by: INTERNAL MEDICINE

## 2023-12-19 PROCEDURE — 86850 RBC ANTIBODY SCREEN: CPT | Performed by: INTERNAL MEDICINE

## 2023-12-19 PROCEDURE — 82248 BILIRUBIN DIRECT: CPT | Performed by: PATHOLOGY

## 2023-12-19 PROCEDURE — 86644 CMV ANTIBODY: CPT | Performed by: INTERNAL MEDICINE

## 2023-12-19 PROCEDURE — 84100 ASSAY OF PHOSPHORUS: CPT | Performed by: PATHOLOGY

## 2023-12-19 PROCEDURE — 82728 ASSAY OF FERRITIN: CPT | Performed by: PATHOLOGY

## 2023-12-19 PROCEDURE — 93000 ELECTROCARDIOGRAM COMPLETE: CPT | Performed by: INTERNAL MEDICINE

## 2023-12-19 PROCEDURE — 99000 SPECIMEN HANDLING OFFICE-LAB: CPT | Performed by: PATHOLOGY

## 2023-12-19 PROCEDURE — 83550 IRON BINDING TEST: CPT | Performed by: PATHOLOGY

## 2023-12-19 PROCEDURE — 84156 ASSAY OF PROTEIN URINE: CPT | Performed by: PATHOLOGY

## 2023-12-19 PROCEDURE — 36415 COLL VENOUS BLD VENIPUNCTURE: CPT | Performed by: PATHOLOGY

## 2023-12-19 PROCEDURE — G0480 DRUG TEST DEF 1-7 CLASSES: HCPCS | Mod: 90 | Performed by: PATHOLOGY

## 2023-12-19 PROCEDURE — 82306 VITAMIN D 25 HYDROXY: CPT | Performed by: INTERNAL MEDICINE

## 2023-12-19 PROCEDURE — 80307 DRUG TEST PRSMV CHEM ANLYZR: CPT | Mod: 90 | Performed by: PATHOLOGY

## 2023-12-19 PROCEDURE — 83036 HEMOGLOBIN GLYCOSYLATED A1C: CPT | Performed by: INTERNAL MEDICINE

## 2023-12-19 PROCEDURE — 86665 EPSTEIN-BARR CAPSID VCA: CPT | Performed by: INTERNAL MEDICINE

## 2023-12-19 NOTE — PROGRESS NOTES
"\"Much or all of the text in this note was generated through the use of Dragon Dictate voice to text software. Errors in spelling or words which appear to be out of context are unintentional, may be present due having escaped editing\"    Assessment and Plan:  1. liver transplant evaluation - patient is a good candidate overall. Benefits and surgical risks of a liver transplantation were discussed.  2.  End stage liver disease due to Laennec's    Surgical evaluation:  1. Portal Vein:Patent  2. Hepatic Artery: Open  3. TIPS: absent  4. Previous Abdominal Surgery: Yes cholecystectomy  5. Hepatocellular Carcinoma: None  6. Ascites: Present - minimal  7. Costal Angle: wide  8. Portopulmonary Hypertension: present  9. Hepatopulmonary Syndrome: absent  10. Cardiac Evaluation: needs stress echocardiogram  11. Nutritional Status: Good  12. Diabetes: yes on insulin  13.Hypertension no  14. Smoker:no  14: Fraility index:no  15. Meets guidelines to receive Living Donor  no-   ...  16. Potential Living donors No  MELD 3.0: 30 at 12/19/2023  7:58 AM  MELD-Na: 30 at 12/19/2023  7:58 AM  Calculated from:  Serum Creatinine: 0.78 mg/dL (Using min of 1 mg/dL) at 12/19/2023  7:58 AM  Serum Sodium: 131 mmol/L at 12/19/2023  7:58 AM  Total Bilirubin: 9.8 mg/dL at 12/19/2023  7:58 AM  Serum Albumin: 2.4 g/dL at 12/19/2023  7:58 AM  INR(ratio): 2.97 at 12/19/2023  7:58 AM  Age at listing (hypothetical): 53 years  Sex: Male at 12/19/2023  7:58 AM     Recommendations:   #1.  Complete workup  #2.  Chest x-ray to evaluate both lung fields and any pneumothorax.        Patients overall evaluation will be discussed at the Liver Transplant selection committee meeting with a final recommendation on the patients suitability for transplant to be made at that time.    Consult Full  Details:  Mary Lopez was seen in consultation at the request of Dr. Inez Dutton MD  for evaluation as a potential liver transplant recipient.    Reason for " "Visit:  Mary Lopez is a 53 year old year old male with Laennec's, who presents for liver transplant evaluation.    HPI:  Presenting complaint: Jaundice    Patient has been diagnosed to have Laënnec cirrhosis.  He has decompensated in the form of ascites and encephalopathy.  The ascites is somewhat controlled with medications.  He does have significant encephalopathy.  No variceal bleeding.  No recent spontaneous bacterial peritonitis.  He does have easy fatigability and weakness.  He also has diabetes is currently on insulin.  No known heart diseasE.  He recently had a fall and subsequently had some \"Pneumothorax\" which was treated conservatively    His wife was present and very supportive.  She works at the [employer].    Past Medical History:   Diagnosis Date    ANGEL (acute kidney injury) (H24)     Alcohol use 03/11/2020    Alcoholic cirrhosis of liver without ascites (H) 07/13/2023    Alcoholic hepatitis with ascites (H28) 10/03/2023    Alcoholic hepatitis without ascites (H28) 07/13/2023    Bilateral leg edema 07/25/2023    Closed fracture of one rib of left side 09/14/2023    Concussion without loss of consciousness 03/11/2020    Decompensated hepatic cirrhosis (H) 09/15/2023    Diabetes mellitus, type 2 (H) 11/22/2023    Epistaxis     Essential hypertension 03/11/2020    GI bleed     Hyponatremia 09/15/2023    IFG (impaired fasting glucose) 12/07/2021    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)     Mild hyperlipidemia 12/07/2021    Other specified anemias 07/13/2023    Persistent insomnia 07/13/2023    Portal hypertension (H) 07/13/2023    Scrotal abscess     Secondary esophageal 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 (H) 07/13/2023    Severe sepsis without septic shock (H)     Thrombocytopenia (H24) 07/13/2023    Tobacco abuse disorder 03/11/2020     Past Surgical History:   Procedure Laterality Date    CHOLECYSTECTOMY  unknown    done at [facility] "     Past Surgical History:   Procedure Laterality Date    CHOLECYSTECTOMY  unknown    done at [facility]     Family History   Problem Relation Age of Onset    Anxiety Disorder Mother     Depression Mother     Bipolar Disorder Mother     Chronic Obstructive Pulmonary Disease Mother     Lung Cancer Mother 81    Morbid Obesity Father     Diabetes Father     Substance Abuse Maternal Grandfather     Substance Abuse Paternal Grandfather     Diabetes Type 2  Brother      Allergies   Allergen Reactions    Food Anaphylaxis     baked beans    Fish Swelling    Fish Allergy     Pineapple Itching    Tilactase      Lactose intolerant      Prior to Admission medications    Medication Sig Start Date End Date Taking? Authorizing Provider   blood glucose (NO BRAND SPECIFIED) test strip Use to test blood sugar two times daily or as directed. To accompany: Blood Glucose Monitor Brands: per insurance. 10/24/23  Yes Jimmie Hoyt MD   blood glucose monitoring (NO BRAND SPECIFIED) meter device kit Use to test blood sugar twice times daily or as directed. Preferred blood glucose meter OR supplies to accompany: Blood Glucose Monitor Brands: per insurance. 10/24/23  Yes Jimmie Hoyt MD   Continuous Blood Gluc Sensor (DEXCOM G7 SENSOR) MISC Change every 10 days. 11/16/23  Yes Jimmie Hoyt MD   cyclobenzaprine (FLEXERIL) 10 MG tablet TAKE 1 TABLET(10 MG) BY MOUTH THREE TIMES DAILY AS NEEDED FOR MUSCLE SPASMS 11/15/23  Yes Jimmie Hoyt MD   doxepin (SINEQUAN) 10 MG capsule TAKE 1 CAPSULE(10 MG) BY MOUTH AT BEDTIME 11/7/23  Yes Jimmie Hoyt MD   ferrous sulfate (FEROSUL) 325 (65 Fe) MG tablet Take 1 tablet (325 mg) by mouth daily (with breakfast) 8/31/23  Yes Jimmie Hoyt MD   insulin degludec (TRESIBA FLEXTOUCH) 100 UNIT/ML pen Inject 40-50 Units Subcutaneous daily Inject 40 units daily.  Increase by 1 unit daily until fasting BG most often <150 as long as this does not lead to overnight or early morning low BG <70.  11/28/23  Yes Suzanne Todd PA-C   Insulin Lispro (HUMALOG KWIKPEN) 200 UNIT/ML soln Inject 10-18 Units Subcutaneous 4 times daily (with meals and nightly) Give 10 units plus sliding scale to correct any premeal blood sugars >175. 11/28/23  Yes Suzanne Todd PA-C   lactulose (CHRONULAC) 10 GM/15ML solution TAKE 30 MLS BY MOUTH 2 TIMES DAILY 10/19/23  Yes Jimmie Hoyt MD   melatonin 10 MG TABS tablet Take 1 tablet (10 mg) by mouth nightly as needed for sleep 8/3/23  Yes Jimmie Hoyt MD   metFORMIN (GLUCOPHAGE XR) 500 MG 24 hr tablet Take 1 tablet (500 mg) by mouth daily (with dinner) 10/24/23  Yes Jimmie Hoyt MD   multivitamin w/minerals (THERA-VIT-M) tablet Take 1 tablet by mouth daily 7/13/23  Yes Jimmie Hoyt MD   omeprazole (PRILOSEC) 20 MG DR capsule Take 1 capsule (20 mg) by mouth 2 times daily 8/3/23  Yes Jimmie Hoyt MD   potassium chloride ER (KLOR-CON M) 10 MEQ CR tablet Take 1 tablet (10 mEq) by mouth 2 times daily 10/4/23  Yes Jimmie Hoyt MD   thin (NO BRAND SPECIFIED) lancets Use with lanceting device. To accompany: Blood Glucose Monitor Brands: per insurance. 10/24/23  Yes Jimmie Hoyt MD   torsemide (DEMADEX) 10 MG tablet Take 1 tablet (10 mg) by mouth daily 10/24/23  Yes Jimmie Hoyt MD   zinc oxide (DESITIN) 20 % external ointment Apply topically as needed for dry skin or irritation 8/3/23  Yes Jimmie Hoyt MD       Previous Transplant Hx: No    Cardiovascular Hx:       h/o Cardiac Issues: No       Exercise Tolerance: shortness of breath with exertion.    Potential Donor(s): No    ROS:    REVIEW OF SYSTEMS (check box if normal)  [x]                GENERAL  [x]                  PULMONARY [x]                 GENITOURINARY  [x]                 CNS                 [x]                  CARDIAC  [x]                  ENDOCRINE  [x]                 EARS,NOSE,THROAT [x]                  GASTROINTESTINAL [x]                  NEUROLOGIC    [x]                 MUSCLOSKELTAL  " [x]                   HEMATOLOGY    Examination:     Vitals:  /66   Pulse 94   Ht 1.753 m (5' 9\")   Wt 104.7 kg (230 lb 12.8 oz)   SpO2 95%   BMI 34.08 kg/m      GENERAL APPEARANCE: alert and no distress; deeply icteric  EYES: PERRL  HENT: mouth without ulcers or lesions  NECK: supple, no adenopathy  RESP: lungs clear to auscultation - no rales, rhonchi or wheezes  CV: regular rhythm, normal rate, no rub   ABDOMEN:  soft, nontender, no HSM or masses and bowel sounds normal  MS: extremities normal- no gross deformities noted, no evidence of inflammation in joints, no muscle tenderness  SKIN: no rash  NEURO: Normal strength and tone, sensory exam grossly normal, mentation intact and speech normal  PSYCH: mentation appears normal. and affect normal/bright      Results:   Recent Results (from the past 168 hour(s))   Basic metabolic panel  (Ca, Cl, CO2, Creat, Gluc, K, Na, BUN)    Collection Time: 12/12/23  3:54 PM   Result Value Ref Range    Sodium 130 (L) 135 - 145 mmol/L    Potassium 4.4 3.4 - 5.3 mmol/L    Chloride 93 (L) 98 - 107 mmol/L    Carbon Dioxide (CO2) 29 22 - 29 mmol/L    Anion Gap 8 7 - 15 mmol/L    Urea Nitrogen 8.5 6.0 - 20.0 mg/dL    Creatinine 0.68 0.67 - 1.17 mg/dL    GFR Estimate >90 >60 mL/min/1.73m2    Calcium 8.6 8.6 - 10.0 mg/dL    Glucose 458 (H) 70 - 99 mg/dL   CBC with platelets and differential    Collection Time: 12/12/23  3:54 PM   Result Value Ref Range    WBC Count 7.3 4.0 - 11.0 10e3/uL    RBC Count 2.95 (L) 4.40 - 5.90 10e6/uL    Hemoglobin 9.9 (L) 13.3 - 17.7 g/dL    Hematocrit 29.2 (L) 40.0 - 53.0 %    MCV 99 78 - 100 fL    MCH 33.6 (H) 26.5 - 33.0 pg    MCHC 33.9 31.5 - 36.5 g/dL    RDW 16.2 (H) 10.0 - 15.0 %    Platelet Count 82 (L) 150 - 450 10e3/uL    % Neutrophils 69 %    % Lymphocytes 14 %    % Monocytes 12 %    % Eosinophils 3 %    % Basophils 1 %    % Immature Granulocytes 1 %    NRBCs per 100 WBC 0 <1 /100    Absolute Neutrophils 5.0 1.6 - 8.3 10e3/uL    Absolute " Lymphocytes 1.0 0.8 - 5.3 10e3/uL    Absolute Monocytes 0.9 0.0 - 1.3 10e3/uL    Absolute Eosinophils 0.2 0.0 - 0.7 10e3/uL    Absolute Basophils 0.1 0.0 - 0.2 10e3/uL    Absolute Immature Granulocytes 0.1 <=0.4 10e3/uL    Absolute NRBCs 0.0 10e3/uL   EKG 12-lead, tracing only [EKG1]    Collection Time: 12/19/23  7:24 AM   Result Value Ref Range    Systolic Blood Pressure  mmHg    Diastolic Blood Pressure  mmHg    Ventricular Rate 96 BPM    Atrial Rate 96 BPM    ND Interval 142 ms    QRS Duration 92 ms     ms    QTc 437 ms    P Axis -30 degrees    R AXIS -29 degrees    T Axis 150 degrees    Interpretation ECG       Unusual P axis, possible ectopic atrial rhythm  Low voltage QRS  Nonspecific T wave abnormality  Abnormal ECG  No previous ECGs available     Basic metabolic panel    Collection Time: 12/19/23  7:58 AM   Result Value Ref Range    Sodium 131 (L) 135 - 145 mmol/L    Potassium 4.0 3.4 - 5.3 mmol/L    Chloride 94 (L) 98 - 107 mmol/L    Carbon Dioxide (CO2) 30 (H) 22 - 29 mmol/L    Anion Gap 7 7 - 15 mmol/L    Urea Nitrogen 11.1 6.0 - 20.0 mg/dL    Creatinine 0.78 0.67 - 1.17 mg/dL    GFR Estimate >90 >60 mL/min/1.73m2    Calcium 8.5 (L) 8.6 - 10.0 mg/dL    Glucose 217 (H) 70 - 99 mg/dL   Hepatic panel    Collection Time: 12/19/23  7:58 AM   Result Value Ref Range    Protein Total 6.7 6.4 - 8.3 g/dL    Albumin 2.4 (L) 3.5 - 5.2 g/dL    Bilirubin Total 9.8 (H) <=1.2 mg/dL    Alkaline Phosphatase 191 (H) 40 - 150 U/L     (H) 0 - 45 U/L    ALT 40 0 - 70 U/L    Bilirubin Direct 4.83 (H) 0.00 - 0.30 mg/dL   Phosphorus    Collection Time: 12/19/23  7:58 AM   Result Value Ref Range    Phosphorus 2.9 2.5 - 4.5 mg/dL   Prostate spec antigen screen    Collection Time: 12/19/23  7:58 AM   Result Value Ref Range    Prostate Specific Antigen Screen 0.07 0.00 - 3.50 ng/mL   INR    Collection Time: 12/19/23  7:58 AM   Result Value Ref Range    INR 2.97 (H) 0.85 - 1.15   Ferritin    Collection Time: 12/19/23   7:58 AM   Result Value Ref Range    Ferritin 2,948 (H) 31 - 409 ng/mL   IRON AND IRON BINDING CAPACITY    Collection Time: 12/19/23  7:58 AM   Result Value Ref Range    Iron 135 61 - 157 ug/dL    Iron Binding Capacity      Iron Sat Index     CBC with platelets and differential    Collection Time: 12/19/23  7:58 AM   Result Value Ref Range    WBC Count 4.9 4.0 - 11.0 10e3/uL    RBC Count 2.60 (L) 4.40 - 5.90 10e6/uL    Hemoglobin 8.8 (L) 13.3 - 17.7 g/dL    Hematocrit 25.5 (L) 40.0 - 53.0 %    MCV 98 78 - 100 fL    MCH 33.8 (H) 26.5 - 33.0 pg    MCHC 34.5 31.5 - 36.5 g/dL    RDW 16.7 (H) 10.0 - 15.0 %    Platelet Count 68 (L) 150 - 450 10e3/uL    % Neutrophils 73 %    % Lymphocytes 12 %    % Monocytes 12 %    % Eosinophils 1 %    % Basophils 1 %    % Immature Granulocytes 1 %    NRBCs per 100 WBC 0 <1 /100    Absolute Neutrophils 3.6 1.6 - 8.3 10e3/uL    Absolute Lymphocytes 0.6 (L) 0.8 - 5.3 10e3/uL    Absolute Monocytes 0.6 0.0 - 1.3 10e3/uL    Absolute Eosinophils 0.1 0.0 - 0.7 10e3/uL    Absolute Basophils 0.1 0.0 - 0.2 10e3/uL    Absolute Immature Granulocytes 0.1 <=0.4 10e3/uL    Absolute NRBCs 0.0 10e3/uL   Protein  random urine    Collection Time: 12/19/23  8:04 AM   Result Value Ref Range    Total Protein Urine mg/dL 19.0   mg/dL    Total Protein Urine mg/mg Creat 0.13 0.00 - 0.20 mg/mg Cr    Creatinine Urine mg/dL 143.0 mg/dL     I had a long discussion with the patient regarding liver transplantation which included but was not limited to  the following points:    Liver transplant selection committee process.  The federal rules for cadaveric waiting list, the size and blood type matching of the organ. The availability of living-related donor transplantation.  The types of donors: brain death donors, non-heart beating donors, partial liver grafts: splits and living donor grafts  Extended criteria  Donors (older age, steasosis) and the increased  risk of primary non-function using the extended criteria  donors  The CDC high risk donors,  Risk of donor transmitted infections and donor transmitted malignancy  The liver transplant operation and the associated risks and technical complications which can include intraoperative death, post operative death,  Primary non-function, bleeding requiring re-operations, arterial and biliary complications, bowel perforations, and intra abdominal abscess. Some of these complicaitons may require a second operation.  The postoperative course, the ICU stay and risk of postoperative complications which can include sepsis, MI, stroke, brain injury, pneumonia, pleural effusions, and renal dysfunction.  The current 1 year and 5 year graft and patient survivals.  The need for life long immunosuppressive therapy and the side effects of these medications, including the possibility of toxicity, opportunistic infections, risk of cancer including lymphoma, and the possibility of rejection even if the patient is taking the medication exactly as prescribed.  The need for compliance with medications and follow-up visits in the clinic and thereafter.  The patient and family understand these risks and wish to proceed to transplantation       Review of prior external note(s) from - other  providers  60 minutes spent by me on the date of the encounter doing chart review, history and exam, documentation and further activities per the note

## 2023-12-19 NOTE — LETTER
12/19/2023         RE: Mary Lopez  [address] [place] [address]        Dear Colleague,    Thank you for referring your patient, Mary Lopez, to the [facility] TRANSPLANT [facility]INIC. Please see a copy of my visit note below.    [facility] Hepatology    New Patient Visit    Referring provider:  Riley Flynn MD    53 year old male    Chief complaint:  Liver transplant evaluation    HPI:  Cirrhosis  - dx June 2023  - ETOH/MASLD/A1AT MZ heterozygote/C282Y heterozygote  - hx ascites  - hx HE  - no hx variceal bleed  - last EGD July 2023- small EV, GOV-2  - HCC screening- MR abdomen Dec 2023    Patient comes to clinic this morning with his wife for a liver transplant evaluation.  He was diagnosed with cirrhosis in summer 2023 when he was admitted to the hospital with scrotal cellulitis.  Etiology of cirrhosis is primarily alcohol with concurrent MASLD, MZ phenotype for alpha-1 antitrypsin deficiency and C282Y heterozygote.  Patient has had issues with ascites and hepatic encephalopathy.    Patient is feeling okay today.  His main issue is occasional confusion.  Patient is currently taking lactulose twice per day and moving his bowels 1-4 times per day.  He is not currently taking rifaximin.    Reports some mild abdominal distention but has never had a therapeutic paracentesis.  Patient has a history of lower extremity edema.    The patient remains jaundiced since discontinuing alcohol.  His bilirubin is predominantly indirect.  Patient denies any itching.    No history of melena, hematemesis or hematochezia.    Patient denies fevers, sweats or chills.  He has not yet had an influenza or COVID-19 vaccination.    Patient has gained 19 pounds over the last month or so.  He does note muscle wasting since being diagnosed with cirrhosis.  Appetite is currently poor.    Patient last drank alcohol at the end of June 2023.  He was drinking 0.5 bottle vodka per day for several years  prior to stopping drinking.  Patient initially quit cold turkey but is now enrolled in 1-on-1 counseling.  Patient has had 2 DUIs in the past most recently in 1996.  He has attended court mandated treatment in the past.  Patient denies any other medical issues due to alcohol.    The patient is an ex-smoker of 16 years.  He previously smoked up to a pack of cigarettes per day for 10 years.    Patient has tried marijuana in the past.  He denies any current or past intranasal or intravenous drug use.    Medical hx Surgical hx   Past Medical History:   Diagnosis Date     ANGEL (acute kidney injury) (H24)      Alcoholic cirrhosis of liver without ascites (H) 07/13/2023     Alcoholic hepatitis with ascites (H28) 10/03/2023     Alcoholic hepatitis without ascites (H28) 07/13/2023     Closed fracture of one rib of left side 09/14/2023     Concussion without loss of consciousness 03/11/2020     Decompensated hepatic cirrhosis (H) 09/15/2023     Diabetes mellitus, type 2 (H) 11/22/2023     Essential hypertension 03/11/2020     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)      Mild hyperlipidemia 12/07/2021     Persistent insomnia 07/13/2023     Portal hypertension (H) 07/13/2023     Scrotal abscess      Secondary esophageal 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 (H) 07/13/2023     Tobacco abuse disorder 03/11/2020      Past Surgical History:   Procedure Laterality Date     CHOLECYSTECTOMY       TONSILLECTOMY       VASECTOMY            Medications  Current Outpatient Medications   Medication Sig Dispense Refill     cyclobenzaprine (FLEXERIL) 10 MG tablet TAKE 1 TABLET(10 MG) BY MOUTH THREE TIMES DAILY AS NEEDED FOR MUSCLE SPASMS 30 tablet 1     doxepin (SINEQUAN) 10 MG capsule TAKE 1 CAPSULE(10 MG) BY MOUTH AT BEDTIME 180 capsule 2     insulin degludec (TRESIBA FLEXTOUCH) 100 UNIT/ML pen Inject 40-50 Units Subcutaneous daily Inject 40 units daily.  Increase by 1 unit daily until fasting BG most often <150 as long as this does not  lead to overnight or early morning low BG <70. 60 mL 1     Insulin Lispro (HUMALOG KWIKPEN) 200 UNIT/ML soln Inject 10-18 Units Subcutaneous 4 times daily (with meals and nightly) Give 10 units plus sliding scale to correct any premeal blood sugars >175. 60 mL 1     lactulose (CHRONULAC) 10 GM/15ML solution TAKE 30 MLS BY MOUTH 2 TIMES DAILY 473 mL 11     melatonin 10 MG TABS tablet Take 1 tablet (10 mg) by mouth nightly as needed for sleep       metFORMIN (GLUCOPHAGE XR) 500 MG 24 hr tablet Take 1 tablet (500 mg) by mouth daily (with dinner) 60 tablet 1     multivitamin w/minerals (THERA-VIT-M) tablet Take 1 tablet by mouth daily 90 tablet 1     omeprazole (PRILOSEC) 20 MG DR capsule Take 1 capsule (20 mg) by mouth 2 times daily 180 capsule 1     potassium chloride ER (KLOR-CON M) 10 MEQ CR tablet Take 1 tablet (10 mEq) by mouth 2 times daily 180 tablet 1     rifaximin (XIFAXAN) 550 MG TABS tablet Take 1 tablet (550 mg) by mouth 2 times daily for 180 days 120 tablet 2     thin (NO BRAND SPECIFIED) lancets Use with lanceting device. To accompany: Blood Glucose Monitor Brands: per insurance. 100 each 6     torsemide (DEMADEX) 10 MG tablet Take 1 tablet (10 mg) by mouth daily       blood glucose (NO BRAND SPECIFIED) test strip Use to test blood sugar two times daily or as directed. To accompany: Blood Glucose Monitor Brands: per insurance. 100 strip 6     blood glucose monitoring (NO BRAND SPECIFIED) meter device kit Use to test blood sugar twice times daily or as directed. Preferred blood glucose meter OR supplies to accompany: Blood Glucose Monitor Brands: per insurance. 1 kit 0     Continuous Blood Gluc Sensor (DEXCOM G7 SENSOR) MISC Change every 10 days. 3 each 5     ferrous sulfate (FEROSUL) 325 (65 Fe) MG tablet Take 1 tablet (325 mg) by mouth daily (with breakfast) (Patient not taking: Reported on 12/19/2023) 90 tablet 3     zinc oxide (DESITIN) 20 % external ointment Apply topically as needed for dry skin or  irritation (Patient not taking: Reported on 12/19/2023)         Allergies  Allergies   Allergen Reactions     Food Anaphylaxis     baked beans     Fish Allergy      Pineapple Itching     Tilactase      Lactose intolerant        Family hx Social hx   Family History   Problem Relation Age of Onset     Anxiety Disorder Mother      Depression Mother      Bipolar Disorder Mother      Chronic Obstructive Pulmonary Disease Mother      Lung Cancer Mother 81     Morbid Obesity Father      Diabetes Father      Diabetes Type 2  Brother      Substance Abuse Maternal Grandfather      Substance Abuse Paternal Grandfather      Colon Cancer No family hx of      Liver Disease No family hx of       Social History     Tobacco Use     Smoking status: Never     Passive exposure: Never     Smokeless tobacco: Current     Types: Chew     Last attempt to quit: 1/1/2004     Tobacco comments:     Chew daily 1/3 of a tin per day   Vaping Use     Vaping Use: Never used   Substance Use Topics     Alcohol use: Not Currently     Alcohol/week: 12.0 standard drinks of alcohol     Types: 12 Standard drinks or equivalent per week     Comment: Sober since 6/25/2023     Drug use: Not Currently     Patient lives with his wife in [place], [place].  He has 1 adult daughter who is healthy.  Patient has not worked since July 2023.  He previously worked as a .     Review of systems  A 10-point review of systems was negative.    Examination  101/66, BMI 34    Gen- well, NAD, A+Ox3, jaundiced  Eye- EOMI, scleral icterus  ENT- MMM, normal oropharynx  Lym- no palpable lymphadenopathy  CVS- S1, S2 normal, no added sounds, RRR  RS- CTA  Abd- obese, mild distension, soft, non-tender, flank dullness to percussion, no obvious organomegaly on palpation or percussion, BS+  Extr- pulses good, mild pitting DANILO bilaterally  MS- hands normal- no clubbing  Neuro- A+Ox3, no asterixis  Skin- telangiectasia, jaundice  Psych- normal mood    Laboratory  Lab Results    Component Value Date     12/19/2023     07/05/2020    POTASSIUM 4.0 12/19/2023    POTASSIUM 3.8 03/12/2022    POTASSIUM 4.2 07/05/2020    CHLORIDE 94 12/19/2023    CHLORIDE 101 03/12/2022    CHLORIDE 102 07/05/2020    CO2 30 12/19/2023    CO2 25 03/12/2022    CO2 28 07/05/2020    BUN 11.1 12/19/2023    BUN 11 03/12/2022    BUN 13 07/05/2020    CR 0.78 12/19/2023    CR 0.95 07/05/2020       Lab Results   Component Value Date    BILITOTAL 9.8 12/19/2023    BILITOTAL 0.8 12/19/2006    ALT 40 12/19/2023    ALT 50 12/19/2006     12/19/2023    AST 52 12/19/2006    ALKPHOS 191 12/19/2023    ALKPHOS 117 12/19/2006       Lab Results   Component Value Date    ALBUMIN 2.4 12/19/2023    ALBUMIN 4.0 09/03/2022    ALBUMIN 5.2 12/19/2006    PROTTOTAL 6.7 12/19/2023    PROTTOTAL 9.7 12/19/2006        Lab Results   Component Value Date    WBC 4.9 12/19/2023    WBC 10.8 12/19/2006    HGB 8.8 12/19/2023    HGB 16.9 12/19/2006    MCV 98 12/19/2023    MCV 85 12/19/2006    PLT 68 12/19/2023     12/19/2006       Lab Results   Component Value Date    INR 2.97 12/19/2023     PEth 11/14/2023 <10  PEth 7/26/2023 negative    Radiology  Abd doppler U/S 12/19/2023 reviewed  MR abdomen 12/7/2023 reviewed    Assessment  53 year old male who presents for a liver transplant evaluation for decompensated alcohol/MASLD/other cirrhosis complicated with ascites and hepatic encephalopathy.  Ascites controlled on current diuretics.  MELD 3.0= 30 (stable).  Last ETOH= June 2023.  Will add rifaximin to optimize management of hepatic encephalopathy.  Will need cardiology evaluation and completion of chemical dependency recommendations prior to listing for liver transplant.  Up-to-date with surveillance of esophageal varices.  Up-to-date with HCC screening.    We discussed the indications for liver transplant, liver transplant evaluation process, MELD 3.0 and liver transplant waiting list and the importance of complete sobriety from  alcohol with regard to preservation of liver function and preservation of potential allograft function.    Plan  Complete abstinence from alcohol  Low Na diet  Continue torsemide  Continue lactulose  Add rifaximin 550mg PO BID  Transplant social work evaluation  Cardiology evaluation  Follow-up in 3 months    Hugo Muniz MD  Hepatology  [facility]    I spent 60 minutes on the date of the encounter doing chart review, history and exam, documentation and further activities as noted above.    On 12/18/2023, approximately 35 minutes of non face-to-face time were spent in review of the patient's medical record to date.  This included review of previous: clinic visits, hospital records, lab results, imaging studies, and procedural documentation.  The findings from this review are summarized in the above note.      Again, thank you for allowing me to participate in the care of your patient.        Sincerely,        Hugo Muniz MD

## 2023-12-19 NOTE — LETTER
12/19/2023         RE: Mary Anayarowan Lopez  [address] [place] [address]        Dear Colleague,    Thank you for referring your patient, Mary Lopez, to the [facility] TRANSPLANT CLINIC. Please see a copy of my visit note below.    [facility] SOLID ORGAN TRANSPLANT  OUTPATIENT MNT: LIVER TRANSPLANT EVALUATION    Current BMI: 34.08 (HT 69 in,  lbs/105 kg)  BMI is within recommendation of <45 for liver transplant    Frailty Assessment-- Frail (4/5 points)- unintentional wt loss, reported exhaustion, reduced , low activity level    Reference:  Score of 0-2 = Not Frail  Score of 3-5 = Frail     FraiLT-- Frail- unable to complete tandem balance position   [email or web address]    Recommendations:  - PT  - Needs more protein in diet- suggested LOW CARB protein shake  - Continue working on glycemic control; avoid high sugar foods      TIME SPENT: 30 minutes  VISIT TYPE: follow up (meets with CDE)  REFERRING PHYSICIAN: Diego   PT ACCOMPANIED BY: his wife     History of previous txp: none     Has Equate nutritional shake- 350 huber, 50  cho    NUTRITION ASSESSMENT  H/o DM II (new dx in the past few months), etoh cirrhosis.   Wife reports he usually wears a CGM, but it fell off prematurely and they did not replace it due to unknown nature of today's testing. He reports working with CDE and is on long and short acting insulin. He does not carb count, but is prescribed a set dose of fast acting insulin + correction pending BG levels. Wife reports recently BG up to 300-400. Last A1c 8.1 10/31/23.     - Appetite: fluctuates based on the day   - Food allergies/intolerances: beans (anaphylaxis), fish (itchy mouth), pineapple, lactose intolerant   - Meal prep & grocery shopping: wife does majority   - Previous RD education: yes  - Issues chewing or swallowing: no   - N/V/D/C: N/V every few weeks  - Food access concerns: not asked     Vitamins, Supplements, Pertinent  "Meds: MVI, potassium chloride   Herbal Medicines/Supplements: none   Protein supplement: irregularly; wife brought Equate nutritional shake to clinic- 350 calories, 13 g protein, 50 g cho    Edema: + ascites (no para), + DANILO     Weight hx: prior  lbs   Muscle loss (pt reports mild, wife reports mod/severe)  Wt Readings from Last 10 Encounters:   12/19/23 104.7 kg (230 lb 12.8 oz)   12/01/23 95.3 kg (210 lb)   11/28/23 95.3 kg (210 lb)   11/22/23 93.4 kg (206 lb)   10/16/23 99.8 kg (220 lb)   09/14/23 94 kg (207 lb 3.2 oz)   08/18/23 113.2 kg (249 lb 8 oz)   08/10/23 119.3 kg (263 lb)   08/03/23 117.8 kg (259 lb 12.8 oz)   07/25/23 107.1 kg (236 lb 3.2 oz)     PHYSICAL ACTIVITY   Does own showering, dressing - gets tired out easily  Prior to illness he worked an active job as - last worked in July  2 falls in the past, no cane/walker  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, 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  Could walk 1 block w/o stopping, but would walk very slowly     DIET RECALL  Breakfast Cereal    Lunch Snacks per below    Dinner Goulash; hamburgers   Snacks Fruit  (>3 svgs/day), candy, cookies    Beverages Vitamin water, water, some juice (12 oz/day), pop \"if around\", Gatorade (1 bottle) regular, Lactaid protein fortified milk (24 oz/day)   Dining out 3x/week- [employer], [employer] (cold cut combo 6\")     NUTRITION DIAGNOSIS   Inadequate protein intake r/t increased needs with liver disease AEB diet recall shows affinity for higher carb food, frail status including low muscle mass noted on  tool, moderate muscle loss noted by pt/wife.     NUTRITION INTERVENTION   Nutrition education provided:  Discussed sodium intake (low sodium foods and drinks, seasoning food without salt and tips for low sodium diet). Reviewed that hyponatremia from blood work is NOT an indicator to consume more salt.     Reviewed adequate protein intake. Encouraged receiving protein from both animal and plant based sources. Consider high protein, " low sugar nutrition drink at least 1/day. Reviewed importance of eating protein ideally at each meal time + with all snacks to help with high protein needs for liver disease and for balanced BG.   Strongly recommended limiting fast acting carbs or carbs only for meals, at least until BG improve.   Opt for protein-fortified cereal/oatmeal, eggs in the AM, Greek yogurt, etc. If snacking on fruit, pair it with a protein/fat to help blunt BG spike, such as nuts/nut butter, cheese stick, etc.   Would avoid all sugary drinks- vitamin water, Gatorade, juice- opt for sugar free versions or just stick to water.     Reviewed post txp diet guidelines in brief (will review in further detail post txp):  (1) Review of proper food safety measures d/t immunosuppressant therapy post-op and increased risk for food-borne illness    (2) Avoid the following post txp d/t risk for rejection, unknown effects on the organs, and/or potential interactions with immunosuppressants:  - Herbal, Chinese, holistic, chiropractic, natural, alternative medicines and supplements  - Detoxes and cleanses  - Weight loss pills  - Protein powders or other products with extracts or herbs (ie green tea extract)    (3) Med regimen and possible side effects    Patient Understanding: Pt verbalized understanding of education provided.  Expected Engagement: Fair  Follow-Up Plans: PRN     NUTRITION GOALS   Include 1 protein drink/day at minimum    Mitali Ly, RD, LD, CCTD

## 2023-12-19 NOTE — LETTER
"    12/19/2023         RE: Mary Lopez  [address] [place] [address]        Dear Colleague,    Thank you for referring your patient, Mary Lopez, to the [facility] TRANSPLANT [facility]INIC. Please see a copy of my visit note below.    \"Much or all of the text in this note was generated through the use of Dragon Dictate voice to text software. Errors in spelling or words which appear to be out of context are unintentional, may be present due having escaped editing\"    Assessment and Plan:  1. liver transplant evaluation - patient is a good candidate overall. Benefits and surgical risks of a liver transplantation were discussed.  2.  End stage liver disease due to Laennec's    Surgical evaluation:  1. Portal Vein:Patent  2. Hepatic Artery: Open  3. TIPS: absent  4. Previous Abdominal Surgery: Yes cholecystectomy  5. Hepatocellular Carcinoma: None  6. Ascites: Present - minimal  7. Costal Angle: wide  8. Portopulmonary Hypertension: present  9. Hepatopulmonary Syndrome: absent  10. Cardiac Evaluation: needs stress echocardiogram  11. Nutritional Status: Good  12. Diabetes: yes on insulin  13.Hypertension no  14. Smoker:no  14: Fraility index:no  15. Meets guidelines to receive Living Donor  no-   ...  16. Potential Living donors No  MELD 3.0: 30 at 12/19/2023  7:58 AM  MELD-Na: 30 at 12/19/2023  7:58 AM  Calculated from:  Serum Creatinine: 0.78 mg/dL (Using min of 1 mg/dL) at 12/19/2023  7:58 AM  Serum Sodium: 131 mmol/L at 12/19/2023  7:58 AM  Total Bilirubin: 9.8 mg/dL at 12/19/2023  7:58 AM  Serum Albumin: 2.4 g/dL at 12/19/2023  7:58 AM  INR(ratio): 2.97 at 12/19/2023  7:58 AM  Age at listing (hypothetical): 53 years  Sex: Male at 12/19/2023  7:58 AM     Recommendations:   #1.  Complete workup  #2.  Chest x-ray to evaluate both lung fields and any pneumothorax.        Patients overall evaluation will be discussed at the Liver Transplant selection committee meeting with a final " "recommendation on the patients suitability for transplant to be made at that time.    Consult Full  Details:  Mary Lopez was seen in consultation at the request of Dr. Inez Dutton MD  for evaluation as a potential liver transplant recipient.    Reason for Visit:  Mary Lopez is a 53 year old year old male with Laennec's, who presents for liver transplant evaluation.    HPI:  Presenting complaint: Jaundice    Patient has been diagnosed to have Laënnec cirrhosis.  He has decompensated in the form of ascites and encephalopathy.  The ascites is somewhat controlled with medications.  He does have significant encephalopathy.  No variceal bleeding.  No recent spontaneous bacterial peritonitis.  He does have easy fatigability and weakness.  He also has diabetes is currently on insulin.  No known heart diseasE.  He recently had a fall and subsequently had some \"Pneumothorax\" which was treated conservatively    His wife was present and very supportive.  She works at the [employer] [facility] [employer].    Past Medical History:   Diagnosis Date    ANGEL (acute kidney injury) (H24)     Alcohol use 03/11/2020    Alcoholic cirrhosis of liver without ascites (H) 07/13/2023    Alcoholic hepatitis with ascites (H28) 10/03/2023    Alcoholic hepatitis without ascites (H28) 07/13/2023    Bilateral leg edema 07/25/2023    Closed fracture of one rib of left side 09/14/2023    Concussion without loss of consciousness 03/11/2020    Decompensated hepatic cirrhosis (H) 09/15/2023    Diabetes mellitus, type 2 (H) 11/22/2023    Epistaxis     Essential hypertension 03/11/2020    GI bleed     Hyponatremia 09/15/2023    IFG (impaired fasting glucose) 12/07/2021    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)     Mild hyperlipidemia 12/07/2021    Other specified anemias 07/13/2023    Persistent insomnia 07/13/2023    Portal hypertension (H) 07/13/2023    Scrotal abscess     Secondary esophageal " 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 (H) 07/13/2023    Severe sepsis without septic shock (H)     Thrombocytopenia (H24) 07/13/2023    Tobacco abuse disorder 03/11/2020     Past Surgical History:   Procedure Laterality Date    CHOLECYSTECTOMY  unknown    done at [facility]     Past Surgical History:   Procedure Laterality Date    CHOLECYSTECTOMY  unknown    done at [facility]     Family History   Problem Relation Age of Onset    Anxiety Disorder Mother     Depression Mother     Bipolar Disorder Mother     Chronic Obstructive Pulmonary Disease Mother     Lung Cancer Mother 81    Morbid Obesity Father     Diabetes Father     Substance Abuse Maternal Grandfather     Substance Abuse Paternal Grandfather     Diabetes Type 2  Brother      Allergies   Allergen Reactions    Food Anaphylaxis     baked beans    Fish Swelling    Fish Allergy     Pineapple Itching    Tilactase      Lactose intolerant      Prior to Admission medications    Medication Sig Start Date End Date Taking? Authorizing Provider   blood glucose (NO BRAND SPECIFIED) test strip Use to test blood sugar two times daily or as directed. To accompany: Blood Glucose Monitor Brands: per insurance. 10/24/23  Yes Jimmie Hoyt MD   blood glucose monitoring (NO BRAND SPECIFIED) meter device kit Use to test blood sugar twice times daily or as directed. Preferred blood glucose meter OR supplies to accompany: Blood Glucose Monitor Brands: per insurance. 10/24/23  Yes Jimmie Hoyt MD   Continuous Blood Gluc Sensor (DEXCOM G7 SENSOR) MISC Change every 10 days. 11/16/23  Yes Jimmie Hoyt MD   cyclobenzaprine (FLEXERIL) 10 MG tablet TAKE 1 TABLET(10 MG) BY MOUTH THREE TIMES DAILY AS NEEDED FOR MUSCLE SPASMS 11/15/23  Yes Jimmie Hoyt MD   doxepin (SINEQUAN) 10 MG capsule TAKE 1 CAPSULE(10 MG) BY MOUTH AT BEDTIME 11/7/23  Yes Jimmie Hoyt MD   ferrous sulfate (FEROSUL) 325 (65 Fe) MG tablet Take 1 tablet (325 mg) by mouth daily (with breakfast) 8/31/23  Yes Lai  Jimmie MENDEZ MD   insulin degludec (TRESIBA FLEXTOUCH) 100 UNIT/ML pen Inject 40-50 Units Subcutaneous daily Inject 40 units daily.  Increase by 1 unit daily until fasting BG most often <150 as long as this does not lead to overnight or early morning low BG <70. 11/28/23  Yes Suzanne Todd PA-C   Insulin Lispro (HUMALOG KWIKPEN) 200 UNIT/ML soln Inject 10-18 Units Subcutaneous 4 times daily (with meals and nightly) Give 10 units plus sliding scale to correct any premeal blood sugars >175. 11/28/23  Yes Suzanne Todd PA-C   lactulose (CHRONULAC) 10 GM/15ML solution TAKE 30 MLS BY MOUTH 2 TIMES DAILY 10/19/23  Yes Jimmie Hoyt MD   melatonin 10 MG TABS tablet Take 1 tablet (10 mg) by mouth nightly as needed for sleep 8/3/23  Yes Jimmie Hoyt MD   metFORMIN (GLUCOPHAGE XR) 500 MG 24 hr tablet Take 1 tablet (500 mg) by mouth daily (with dinner) 10/24/23  Yes Jimmie Hoyt MD   multivitamin w/minerals (THERA-VIT-M) tablet Take 1 tablet by mouth daily 7/13/23  Yes Jimmie Hoyt MD   omeprazole (PRILOSEC) 20 MG DR capsule Take 1 capsule (20 mg) by mouth 2 times daily 8/3/23  Yes Jimmie Hoyt MD   potassium chloride ER (KLOR-CON M) 10 MEQ CR tablet Take 1 tablet (10 mEq) by mouth 2 times daily 10/4/23  Yes Jimmie Hoyt MD   thin (NO BRAND SPECIFIED) lancets Use with lanceting device. To accompany: Blood Glucose Monitor Brands: per insurance. 10/24/23  Yes Jimmie Hoyt MD   torsemide (DEMADEX) 10 MG tablet Take 1 tablet (10 mg) by mouth daily 10/24/23  Yes Jimmie Hoyt MD   zinc oxide (DESITIN) 20 % external ointment Apply topically as needed for dry skin or irritation 8/3/23  Yes Jimmie Hoyt MD       Previous Transplant Hx: No    Cardiovascular Hx:       h/o Cardiac Issues: No       Exercise Tolerance: shortness of breath with exertion.    Potential Donor(s): No    ROS:    REVIEW OF SYSTEMS (check box if normal)  [x]                GENERAL  [x]                  PULMONARY [x]                  "GENITOURINARY  [x]                 CNS                 [x]                  CARDIAC  [x]                  ENDOCRINE  [x]                 EARS,NOSE,THROAT [x]                  GASTROINTESTINAL [x]                  NEUROLOGIC    [x]                 MUSCLOSKELTAL  [x]                   HEMATOLOGY    Examination:     Vitals:  /66   Pulse 94   Ht 1.753 m (5' 9\")   Wt 104.7 kg (230 lb 12.8 oz)   SpO2 95%   BMI 34.08 kg/m      GENERAL APPEARANCE: alert and no distress; deeply icteric  EYES: PERRL  HENT: mouth without ulcers or lesions  NECK: supple, no adenopathy  RESP: lungs clear to auscultation - no rales, rhonchi or wheezes  CV: regular rhythm, normal rate, no rub   ABDOMEN:  soft, nontender, no HSM or masses and bowel sounds normal  MS: extremities normal- no gross deformities noted, no evidence of inflammation in joints, no muscle tenderness  SKIN: no rash  NEURO: Normal strength and tone, sensory exam grossly normal, mentation intact and speech normal  PSYCH: mentation appears normal. and affect normal/bright      Results:   Recent Results (from the past 168 hour(s))   Basic metabolic panel  (Ca, Cl, CO2, Creat, Gluc, K, Na, BUN)    Collection Time: 12/12/23  3:54 PM   Result Value Ref Range    Sodium 130 (L) 135 - 145 mmol/L    Potassium 4.4 3.4 - 5.3 mmol/L    Chloride 93 (L) 98 - 107 mmol/L    Carbon Dioxide (CO2) 29 22 - 29 mmol/L    Anion Gap 8 7 - 15 mmol/L    Urea Nitrogen 8.5 6.0 - 20.0 mg/dL    Creatinine 0.68 0.67 - 1.17 mg/dL    GFR Estimate >90 >60 mL/min/1.73m2    Calcium 8.6 8.6 - 10.0 mg/dL    Glucose 458 (H) 70 - 99 mg/dL   CBC with platelets and differential    Collection Time: 12/12/23  3:54 PM   Result Value Ref Range    WBC Count 7.3 4.0 - 11.0 10e3/uL    RBC Count 2.95 (L) 4.40 - 5.90 10e6/uL    Hemoglobin 9.9 (L) 13.3 - 17.7 g/dL    Hematocrit 29.2 (L) 40.0 - 53.0 %    MCV 99 78 - 100 fL    MCH 33.6 (H) 26.5 - 33.0 pg    MCHC 33.9 31.5 - 36.5 g/dL    RDW 16.2 (H) 10.0 - 15.0 %    " Platelet Count 82 (L) 150 - 450 10e3/uL    % Neutrophils 69 %    % Lymphocytes 14 %    % Monocytes 12 %    % Eosinophils 3 %    % Basophils 1 %    % Immature Granulocytes 1 %    NRBCs per 100 WBC 0 <1 /100    Absolute Neutrophils 5.0 1.6 - 8.3 10e3/uL    Absolute Lymphocytes 1.0 0.8 - 5.3 10e3/uL    Absolute Monocytes 0.9 0.0 - 1.3 10e3/uL    Absolute Eosinophils 0.2 0.0 - 0.7 10e3/uL    Absolute Basophils 0.1 0.0 - 0.2 10e3/uL    Absolute Immature Granulocytes 0.1 <=0.4 10e3/uL    Absolute NRBCs 0.0 10e3/uL   EKG 12-lead, tracing only [EKG1]    Collection Time: 12/19/23  7:24 AM   Result Value Ref Range    Systolic Blood Pressure  mmHg    Diastolic Blood Pressure  mmHg    Ventricular Rate 96 BPM    Atrial Rate 96 BPM    IL Interval 142 ms    QRS Duration 92 ms     ms    QTc 437 ms    P Axis -30 degrees    R AXIS -29 degrees    T Axis 150 degrees    Interpretation ECG       Unusual P axis, possible ectopic atrial rhythm  Low voltage QRS  Nonspecific T wave abnormality  Abnormal ECG  No previous ECGs available     Basic metabolic panel    Collection Time: 12/19/23  7:58 AM   Result Value Ref Range    Sodium 131 (L) 135 - 145 mmol/L    Potassium 4.0 3.4 - 5.3 mmol/L    Chloride 94 (L) 98 - 107 mmol/L    Carbon Dioxide (CO2) 30 (H) 22 - 29 mmol/L    Anion Gap 7 7 - 15 mmol/L    Urea Nitrogen 11.1 6.0 - 20.0 mg/dL    Creatinine 0.78 0.67 - 1.17 mg/dL    GFR Estimate >90 >60 mL/min/1.73m2    Calcium 8.5 (L) 8.6 - 10.0 mg/dL    Glucose 217 (H) 70 - 99 mg/dL   Hepatic panel    Collection Time: 12/19/23  7:58 AM   Result Value Ref Range    Protein Total 6.7 6.4 - 8.3 g/dL    Albumin 2.4 (L) 3.5 - 5.2 g/dL    Bilirubin Total 9.8 (H) <=1.2 mg/dL    Alkaline Phosphatase 191 (H) 40 - 150 U/L     (H) 0 - 45 U/L    ALT 40 0 - 70 U/L    Bilirubin Direct 4.83 (H) 0.00 - 0.30 mg/dL   Phosphorus    Collection Time: 12/19/23  7:58 AM   Result Value Ref Range    Phosphorus 2.9 2.5 - 4.5 mg/dL   Prostate spec antigen screen     Collection Time: 12/19/23  7:58 AM   Result Value Ref Range    Prostate Specific Antigen Screen 0.07 0.00 - 3.50 ng/mL   INR    Collection Time: 12/19/23  7:58 AM   Result Value Ref Range    INR 2.97 (H) 0.85 - 1.15   Ferritin    Collection Time: 12/19/23  7:58 AM   Result Value Ref Range    Ferritin 2,948 (H) 31 - 409 ng/mL   IRON AND IRON BINDING CAPACITY    Collection Time: 12/19/23  7:58 AM   Result Value Ref Range    Iron 135 61 - 157 ug/dL    Iron Binding Capacity      Iron Sat Index     CBC with platelets and differential    Collection Time: 12/19/23  7:58 AM   Result Value Ref Range    WBC Count 4.9 4.0 - 11.0 10e3/uL    RBC Count 2.60 (L) 4.40 - 5.90 10e6/uL    Hemoglobin 8.8 (L) 13.3 - 17.7 g/dL    Hematocrit 25.5 (L) 40.0 - 53.0 %    MCV 98 78 - 100 fL    MCH 33.8 (H) 26.5 - 33.0 pg    MCHC 34.5 31.5 - 36.5 g/dL    RDW 16.7 (H) 10.0 - 15.0 %    Platelet Count 68 (L) 150 - 450 10e3/uL    % Neutrophils 73 %    % Lymphocytes 12 %    % Monocytes 12 %    % Eosinophils 1 %    % Basophils 1 %    % Immature Granulocytes 1 %    NRBCs per 100 WBC 0 <1 /100    Absolute Neutrophils 3.6 1.6 - 8.3 10e3/uL    Absolute Lymphocytes 0.6 (L) 0.8 - 5.3 10e3/uL    Absolute Monocytes 0.6 0.0 - 1.3 10e3/uL    Absolute Eosinophils 0.1 0.0 - 0.7 10e3/uL    Absolute Basophils 0.1 0.0 - 0.2 10e3/uL    Absolute Immature Granulocytes 0.1 <=0.4 10e3/uL    Absolute NRBCs 0.0 10e3/uL   Protein  random urine    Collection Time: 12/19/23  8:04 AM   Result Value Ref Range    Total Protein Urine mg/dL 19.0   mg/dL    Total Protein Urine mg/mg Creat 0.13 0.00 - 0.20 mg/mg Cr    Creatinine Urine mg/dL 143.0 mg/dL     I had a long discussion with the patient regarding liver transplantation which included but was not limited to  the following points:    Liver transplant selection committee process.  The federal rules for cadaveric waiting list, the size and blood type matching of the organ. The availability of living-related donor  transplantation.  The types of donors: brain death donors, non-heart beating donors, partial liver grafts: splits and living donor grafts  Extended criteria  Donors (older age, steasosis) and the increased  risk of primary non-function using the extended criteria donors  The CDC high risk donors,  Risk of donor transmitted infections and donor transmitted malignancy  The liver transplant operation and the associated risks and technical complications which can include intraoperative death, post operative death,  Primary non-function, bleeding requiring re-operations, arterial and biliary complications, bowel perforations, and intra abdominal abscess. Some of these complicaitons may require a second operation.  The postoperative course, the ICU stay and risk of postoperative complications which can include sepsis, MI, stroke, brain injury, pneumonia, pleural effusions, and renal dysfunction.  The current 1 year and 5 year graft and patient survivals.  The need for life long immunosuppressive therapy and the side effects of these medications, including the possibility of toxicity, opportunistic infections, risk of cancer including lymphoma, and the possibility of rejection even if the patient is taking the medication exactly as prescribed.  The need for compliance with medications and follow-up visits in the clinic and thereafter.  The patient and family understand these risks and wish to proceed to transplantation       Review of prior external note(s) from - other  providers  60 minutes spent by me on the date of the encounter doing chart review, history and exam, documentation and further activities per the note         Again, thank you for allowing me to participate in the care of your patient.        Sincerely,        Ryder Cortez MD

## 2023-12-19 NOTE — PROGRESS NOTES
Transplant Social Work Services Progress Note      Date of Initial Social Work Evaluation: 12/7/23  Collaborated with: Liver Transplant Team    Data: Mary is being evaluated for a liver transplant.  Intervention: I met with Mary and his wife Adina.  IOANA obtained for communication with [facility].   Assessment: Mary has been sober from alcohol since June 25, 2023(six months next week).  Mary completed a substance use assessment at [facility] on 11/9/23, and he was recommended to begin individual therapy.  Mary established care with a therapy provider at [facility] a couple of weeks ago and he will have his third session this week.  Mary plans to have weekly sessions.  I asked Mary and Adina to request the provider fax updates to me.  My fax number was provided. Mary also chews a tin of tobacco every three days, and he was previously advised to work on a plan to quit.     Education provided by SW: Caregiver Agreement for Liver Transplantation, Health Care Directive (has document completed but needs notary), Liver Transplant Support Group  Plan: I will remain involved throughout Mary's liver transplant evaluation.        CAPRI Brock, [facility]  Liver Transplant   Phone [phone]  Pager [phone]

## 2023-12-19 NOTE — TELEPHONE ENCOUNTER
Patient has upcoming appointment on 12/22/2023. Please advise if able to place MRI orders or other instructions at this time.    BRIDGET Vital  [facility] Primary Care Triage

## 2023-12-19 NOTE — COMMITTEE REVIEW
Abdominal Committee Review Note     Evaluation Date: 12/19/2023  Committee Review Date: 12/19/2023    Organ being evaluated for: Liver    Transplant Phase: Evaluation  Transplant Status: Active    Transplant Coordinator: Keaton Martinez Jr.  Transplant Surgeon: Dr. 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-Associated Hepatitis  Secondary Diagnosis:     Committee Review Members:  Nutrition Mitali Ly, RD   Pharmacist Dena Martinez, [facility]    - Clinical Tee Shepherd, MSW, Bridgette Stewart, [facility], Antolin Deleon, [facility]   Transplant Lore Mckeon, RN, Valentina Enamorado, LPN, Sayda Herrera, RN, Isabella Quiñonez, RN, Jr Keaton Martinez, RN, Marcella Betancourt, APRN CNP, Tomas Berrios, RN, Evelyn Don, RN   Transplant Hepatology  Quynh Longoria MD, Sher Bailey MD, Ana Maria Haas MD, Liana Roldan MD, Hugo Muniz MD   Transplant Surgery Benedicto Barrett MD, Ying Bowling, APRN CNP, Fabi Aguirre NP, Vicki Mccord NP, Estiven Martel MD, Joanna Goldman MD, Ryder Cortez MD, Chris Garcia MD       Transplant Eligibility: Cirrhosis with MELD, ETOH    Committee Review Decision: Approved    Relative Contraindications: None    Absolute Contraindications: None    Committee Chair Hugo Daigle MD verbally attested to the committee's decision.    Committee Discussion Details:     Approved pending, but not limited too, the following:    PT needed now - ordered  Continue with weekly therapy sessions but has meet transplant recommendations  Needs full cardiology - request / need to move up appt (done)  CT chest - done in Sept and will get with CTA cor arteries for cards work up (done)  Discuss rifaximin script / cost with him (done)  Colonoscopy - ordered and getting jesus'd

## 2023-12-19 NOTE — LETTER
12/19/2023         RE: Mary Smith Jessica  [address] [place] [address]        Dear Colleague,    Thank you for referring your patient, Mary Lopez, to the [facility] TRANSPLANT CLINIC. Please see a copy of my visit note below.    Transplant Social Work Services Progress Note      Date of Initial Social Work Evaluation: 12/7/23  Collaborated with: Liver Transplant Team    Data: Mary is being evaluated for a liver transplant.  Intervention: I met with Mary and his wife Adina.  IOANA obtained for communication with [facility].   Assessment: Mary has been sober from alcohol since June 25, 2023(six months next week).  Mary completed a substance use assessment at [facility] on 11/9/23, and he was recommended to begin individual therapy.  Mary established care with a therapy provider at [facility] a couple of weeks ago and he will have his third session this week.  Mary plans to have weekly sessions.  I asked Mary and Adina to request the provider fax updates to me.  My fax number was provided. Mary also chews a tin of tobacco every three days, and he was previously advised to work on a plan to quit.     Education provided by : Caregiver Agreement for Liver Transplantation, Health Care Directive (has document completed but needs notary), Liver Transplant Support Group  Plan: I will remain involved throughout Mary's liver transplant evaluation.        CAPRI Brock, [facility]  Liver Transplant   Phone [phone]  Pager [phone]       Again, thank you for allowing me to participate in the care of your patient.        Sincerely,        CAPRI Estrada

## 2023-12-19 NOTE — PATIENT INSTRUCTIONS
"Lower Carb Protein Supplements    Protein Bars: Power Crunch Protein Bar, Premier Protein, Atkins, Think!, Pure Protein, Quest    Ready-to-Drink Products: Atkins Lift, Bariatric Advantage Meal Replacement Shake, Boost Glucose Control High Protein, Boost Max, EAS Advantedge High Protein Shakes, EAS Advantedge Ready-to-Drink shakes, Ensure Light, High Protein Slim Fast, Iconic Protein Drink  ISS Oh Yeah! Nutritional Shake, LiquaCel, Muscle Milk Genuine Protein Shake, Muscle Milk Pro Series Protein Shake, Nature's Best Isopure Zero Carb Drink, Premier Protein, [id number]  * Wellness Protein Smoothie (similar to applesauce pouch)    Several protein powders are low carb. Double check the labels, as not all protein powders are created equal.     Nutrition & Frailty for Transplant Recipients  With end-stage organ disease, you are at higher risk for malnutrition, deconditioning, frailty, and a reduced functional status. The goal is to have a well-maintained nutritional status (or a good nutritional reserve), as this will help with quality of life, energy level, ability to undergo a major operation, including improved recovery. Once you \"lose\" your nutritional buffer or functional status, it is very hard to get back.     Functional Status:  - Combined with good nutrition, maintaining functional status will help keep you strong enough for surgery.   - Do what activity you can tolerate. Please ask your Physician for a Physical Therapy referral if you would find this helpful.     Nutrition:  - Protein foods are the building blocks of muscle. Try to get protein at each meal and snack time. Including protein before bed is also helpful to carry you through the night when your muscle is more prone to breakdown.   - Sometimes drinking liquids is better tolerated compared to eating solid food. If this is the case, please supplement with a pre-made protein shake or make your own smoothie with protein powder, milk/water, yogurt, " fruit, nuts/alessandra seeds, etc    Sources of Protein  For animal proteins (chicken, beef, fish), deck of cards size is approximately 3 ounces or 24 grams protein.  Food Portion  Grams of Protein   Animal proteins (chicken, turkey, venison, fish/seafood, red meat) 1 ounce  7-9   Egg  1  6   Cottage cheese  1/4 cup  7    Cheese  1 ounce (1 slice, 1 cheese stick) 6   Milk (cow's) 4 ounces or 1/2 cup  4   Dry skim milk powder 2 Tbsp  4   Ricotta cheese  1/4 cup  7    Palco Instant Breakfast (made with milk) 1/2 cup  7   Pudding 1/2 cup  4   Yogurt (ie Yoplait) 1/2 cup  5   Greek yogurt 5 oz container 15   Tofu  1/4 cup  5   Soymilk  1/2 cup  3   Tempeh  1/4 cup  8   Lentils  1/4 cup 5   Kidney beans, black beans, etc 1/4 cup  4   Chickpeas 1/4 cup  4   Veggie burger  1 chad  7   Soy nuts  1/4 cup  17   Sunflower seeds  2 Tbsp  8   Peanuts  1 ounce 7   Almonds  15 6   Pistachios 25 6   Peanut/almond butter 2 Tbsp  8        Liver Health & Nutrition      Visit [email or web address] to review valuable information about nutrition and exercise     Nutrition  - For patients with cirrhosis, it is very important to eat the right types and amounts of foods.  We recommend a diet low in carbohydrates/sugars and high in fresh fruits/vegetables, with the right amount of protein.  We typically recommend  grams of protein every day. Your Registered Dietitian will be able to calculate your estimated protein needs based on your weight and degree of muscle wasting associated with your liver disease.  - In regard to protein intake, focus on: red meat, poultry, cooked fish and seafood, vegetable-based protein meat products/meat substitutes (Beyond Burgers, etc), tofu and other soy products, eggs, beans/legumes, dairy products (including milk and yogurt and cheese), unsalted nuts, nut butters, etc.  - You should eat at least three meals a day and three to four snacks between meals. Your goal is to include protein at each meal and  "snack. Focus on eating protein first, then if still hungry, go for the vegetables, fruit, starches, etc.   - Bedtime snacks are especially important (preferrably something with some protein) - toast or fruit with peanut butter, spoonful of peanut butter, Greek yogurt, handful of nuts, string cheese stick, boiled egg, protein bar.  - Patients with malnutrition and/or loss of muscle mass can improve their nutrition and muscle mass by drinking at least 2 protein drinks per day. A good time of day to drink one of these is before bed, when your body is preparing to be in the \"fasted\" state all night and muscle mass may break down more readily. Some options include: Ensure, Boost, Glucerna, Premier Protein, FairLife, Orgain, or powdered whey protein (or similar supplements) mixed with milk, yogurt, fruit, peanut butter, etc. Some people use protein powder in oatmeal/hot cereal, in their coffee, in homemade pudding. Another option is a small-volume protein supplement (1 ounce only)- Prostat and Liquacel are two popular brands. You can buy small sample packs on Amazon to see if you like them.   - Please avoid eating raw seafood, especially shellfish, because of risk of serious illness  - We recommend all patients with cirrhosis limit their daily sodium intake to less than 2000 mg. Consider tracking your current sodium intake for 3 days on pen/paper or with an emy like [employer] to understand where most of your sodium is coming from, what you might need to modify in your diet to remain within the 2000 mg/day budget, etc.       Post Transplant Nutrition Highlights  These guidelines are intended for as long as you are on immunosuppression, which is typically lifelong. More detailed information to come during your transplant hospital stay.    1) Avoid grapefruit and pomegranate due to interactions with your transplant medications.    2) You are at increased risk for food borne illness due to a weakened immune system. " "  Higher risk foods/scenarios to avoid:  Leftovers older than 3 days. Freeze or throw if you don't plan to eat within 3 days.  Raw or undercooked eggs (over easy eggs, onesimo side up eggs, soft boiled eggs, raw eggs in cookie dough). If you like over easy or undercooked eggs, buy pastuerized eggs (organic, free-range, cage-free do NOT equal pasteurized). Pasteurized means they have been treated to kill any harmful bacteria. Not all stores carry them.   Raw or undercooked fish, meat, poultry. This includes raw fish sushi, cooked-medium salmon, ceviche, pink hamburger meat, rare steak (medium is OK).   Raw milk or cheese. Double check cheese labels (especially fancy cheeses) to ensure they use pasteurized milk.  Buffets, potlucks, deli counters, etc   Deli meat: deli meat from the deli counter poses 2x the risk for food poisoning. You can heat up the deli meat to kill any potential bacteria, otherwise a safer option is pre-packaged deli meat (use within 2 days of opening the plastic package).     3) Avoid herbal, non-traditional, alternative or holistic medications/nutritional supplements. These are often \"immune boosting\", yet you are intentionally on immunosuppression. These work against each other. A few examples:  Turmeric in supplements can interact with your transplant medications (turmeric in cooking is OK)  You can drink green tea from a tea bag, but green tea extract is much more potent and can have negative side effects, including liver failure   Herbal supplements can sometimes sneak their way into seemingly standard \"multivitamins\" or protein powders. Always double check the ingredient list or ask if you are unsure.     Mitali CAMACHO CCTD  Transplant Dietitian  [phone]    "

## 2023-12-19 NOTE — PROGRESS NOTES
[facility]    New Patient Visit    Referring provider:  Riley Flynn MD    53 year old male    Chief complaint:  Liver transplant evaluation    HPI:  Cirrhosis  - dx June 2023  - ETOH/MASLD/A1AT MZ heterozygote/C282Y heterozygote  - hx ascites  - hx HE  - no hx variceal bleed  - last EGD July 2023- small EV, GOV-2  - HCC screening- MR abdomen Dec 2023    Patient comes to clinic this morning with his wife for a liver transplant evaluation.  He was diagnosed with cirrhosis in summer 2023 when he was admitted to the hospital with scrotal cellulitis.  Etiology of cirrhosis is primarily alcohol with concurrent MASLD, MZ phenotype for alpha-1 antitrypsin deficiency and C282Y heterozygote.  Patient has had issues with ascites and hepatic encephalopathy.    Patient is feeling okay today.  His main issue is occasional confusion.  Patient is currently taking lactulose twice per day and moving his bowels 1-4 times per day.  He is not currently taking rifaximin.    Reports some mild abdominal distention but has never had a therapeutic paracentesis.  Patient has a history of lower extremity edema.    The patient remains jaundiced since discontinuing alcohol.  His bilirubin is predominantly indirect.  Patient denies any itching.    No history of melena, hematemesis or hematochezia.    Patient denies fevers, sweats or chills.  He has not yet had an influenza or COVID-19 vaccination.    Patient has gained 19 pounds over the last month or so.  He does note muscle wasting since being diagnosed with cirrhosis.  Appetite is currently poor.    Patient last drank alcohol at the end of June 2023.  He was drinking 0.5 bottle vodka per day for several years prior to stopping drinking.  Patient initially quit cold turkey but is now enrolled in 1-on-1 counseling.  Patient has had 2 DUIs in the past most recently in 1996.  He has attended court mandated treatment in the past.  Patient denies any other medical issues due to  alcohol.    The patient is an ex-smoker of 16 years.  He previously smoked up to a pack of cigarettes per day for 10 years.    Patient has tried marijuana in the past.  He denies any current or past intranasal or intravenous drug use.    Medical hx Surgical hx   Past Medical History:   Diagnosis Date     ANGEL (acute kidney injury) (H24)      Alcoholic cirrhosis of liver without ascites (H) 07/13/2023     Alcoholic hepatitis with ascites (H28) 10/03/2023     Alcoholic hepatitis without ascites (H28) 07/13/2023     Closed fracture of one rib of left side 09/14/2023     Concussion without loss of consciousness 03/11/2020     Decompensated hepatic cirrhosis (H) 09/15/2023     Diabetes mellitus, type 2 (H) 11/22/2023     Essential hypertension 03/11/2020     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)      Mild hyperlipidemia 12/07/2021     Persistent insomnia 07/13/2023     Portal hypertension (H) 07/13/2023     Scrotal abscess      Secondary esophageal 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 (H) 07/13/2023     Tobacco abuse disorder 03/11/2020      Past Surgical History:   Procedure Laterality Date     CHOLECYSTECTOMY       TONSILLECTOMY       VASECTOMY            Medications  Current Outpatient Medications   Medication Sig Dispense Refill     cyclobenzaprine (FLEXERIL) 10 MG tablet TAKE 1 TABLET(10 MG) BY MOUTH THREE TIMES DAILY AS NEEDED FOR MUSCLE SPASMS 30 tablet 1     doxepin (SINEQUAN) 10 MG capsule TAKE 1 CAPSULE(10 MG) BY MOUTH AT BEDTIME 180 capsule 2     insulin degludec (TRESIBA FLEXTOUCH) 100 UNIT/ML pen Inject 40-50 Units Subcutaneous daily Inject 40 units daily.  Increase by 1 unit daily until fasting BG most often <150 as long as this does not lead to overnight or early morning low BG <70. 60 mL 1     Insulin Lispro (HUMALOG KWIKPEN) 200 UNIT/ML soln Inject 10-18 Units Subcutaneous 4 times daily (with meals and nightly) Give 10 units plus sliding scale to correct any premeal blood sugars >175. 60 mL 1      lactulose (CHRONULAC) 10 GM/15ML solution TAKE 30 MLS BY MOUTH 2 TIMES DAILY 473 mL 11     melatonin 10 MG TABS tablet Take 1 tablet (10 mg) by mouth nightly as needed for sleep       metFORMIN (GLUCOPHAGE XR) 500 MG 24 hr tablet Take 1 tablet (500 mg) by mouth daily (with dinner) 60 tablet 1     multivitamin w/minerals (THERA-VIT-M) tablet Take 1 tablet by mouth daily 90 tablet 1     omeprazole (PRILOSEC) 20 MG DR capsule Take 1 capsule (20 mg) by mouth 2 times daily 180 capsule 1     potassium chloride ER (KLOR-CON M) 10 MEQ CR tablet Take 1 tablet (10 mEq) by mouth 2 times daily 180 tablet 1     rifaximin (XIFAXAN) 550 MG TABS tablet Take 1 tablet (550 mg) by mouth 2 times daily for 180 days 120 tablet 2     thin (NO BRAND SPECIFIED) lancets Use with lanceting device. To accompany: Blood Glucose Monitor Brands: per insurance. 100 each 6     torsemide (DEMADEX) 10 MG tablet Take 1 tablet (10 mg) by mouth daily       blood glucose (NO BRAND SPECIFIED) test strip Use to test blood sugar two times daily or as directed. To accompany: Blood Glucose Monitor Brands: per insurance. 100 strip 6     blood glucose monitoring (NO BRAND SPECIFIED) meter device kit Use to test blood sugar twice times daily or as directed. Preferred blood glucose meter OR supplies to accompany: Blood Glucose Monitor Brands: per insurance. 1 kit 0     Continuous Blood Gluc Sensor (DEXCOM G7 SENSOR) MISC Change every 10 days. 3 each 5     ferrous sulfate (FEROSUL) 325 (65 Fe) MG tablet Take 1 tablet (325 mg) by mouth daily (with breakfast) (Patient not taking: Reported on 12/19/2023) 90 tablet 3     zinc oxide (DESITIN) 20 % external ointment Apply topically as needed for dry skin or irritation (Patient not taking: Reported on 12/19/2023)         Allergies  Allergies   Allergen Reactions     Food Anaphylaxis     baked beans     Fish Allergy      Pineapple Itching     Tilactase      Lactose intolerant        Family hx Social hx   Family History    Problem Relation Age of Onset     Anxiety Disorder Mother      Depression Mother      Bipolar Disorder Mother      Chronic Obstructive Pulmonary Disease Mother      Lung Cancer Mother 81     Morbid Obesity Father      Diabetes Father      Diabetes Type 2  Brother      Substance Abuse Maternal Grandfather      Substance Abuse Paternal Grandfather      Colon Cancer No family hx of      Liver Disease No family hx of       Social History     Tobacco Use     Smoking status: Never     Passive exposure: Never     Smokeless tobacco: Current     Types: Chew     Last attempt to quit: 1/1/2004     Tobacco comments:     Chew daily 1/3 of a tin per day   Vaping Use     Vaping Use: Never used   Substance Use Topics     Alcohol use: Not Currently     Alcohol/week: 12.0 standard drinks of alcohol     Types: 12 Standard drinks or equivalent per week     Comment: Sober since 6/25/2023     Drug use: Not Currently     Patient lives with his wife in [place], [place].  He has 1 adult daughter who is healthy.  Patient has not worked since July 2023.  He previously worked as a .     Review of systems  A 10-point review of systems was negative.    Examination  101/66, BMI 34    Gen- well, NAD, A+Ox3, jaundiced  Eye- EOMI, scleral icterus  ENT- MMM, normal oropharynx  Lym- no palpable lymphadenopathy  CVS- S1, S2 normal, no added sounds, RRR  RS- CTA  Abd- obese, mild distension, soft, non-tender, flank dullness to percussion, no obvious organomegaly on palpation or percussion, BS+  Extr- pulses good, mild pitting DANILO bilaterally  MS- hands normal- no clubbing  Neuro- A+Ox3, no asterixis  Skin- telangiectasia, jaundice  Psych- normal mood    Laboratory  Lab Results   Component Value Date     12/19/2023     07/05/2020    POTASSIUM 4.0 12/19/2023    POTASSIUM 3.8 03/12/2022    POTASSIUM 4.2 07/05/2020    CHLORIDE 94 12/19/2023    CHLORIDE 101 03/12/2022    CHLORIDE 102 07/05/2020    CO2 30 12/19/2023    CO2 25  03/12/2022    CO2 28 07/05/2020    BUN 11.1 12/19/2023    BUN 11 03/12/2022    BUN 13 07/05/2020    CR 0.78 12/19/2023    CR 0.95 07/05/2020       Lab Results   Component Value Date    BILITOTAL 9.8 12/19/2023    BILITOTAL 0.8 12/19/2006    ALT 40 12/19/2023    ALT 50 12/19/2006     12/19/2023    AST 52 12/19/2006    ALKPHOS 191 12/19/2023    ALKPHOS 117 12/19/2006       Lab Results   Component Value Date    ALBUMIN 2.4 12/19/2023    ALBUMIN 4.0 09/03/2022    ALBUMIN 5.2 12/19/2006    PROTTOTAL 6.7 12/19/2023    PROTTOTAL 9.7 12/19/2006        Lab Results   Component Value Date    WBC 4.9 12/19/2023    WBC 10.8 12/19/2006    HGB 8.8 12/19/2023    HGB 16.9 12/19/2006    MCV 98 12/19/2023    MCV 85 12/19/2006    PLT 68 12/19/2023     12/19/2006       Lab Results   Component Value Date    INR 2.97 12/19/2023     PEth 11/14/2023 <10  PEth 7/26/2023 negative    Radiology  Abd doppler U/S 12/19/2023 reviewed  MR abdomen 12/7/2023 reviewed    Assessment  53 year old male who presents for a liver transplant evaluation for decompensated alcohol/MASLD/other cirrhosis complicated with ascites and hepatic encephalopathy.  Ascites controlled on current diuretics.  MELD 3.0= 30 (stable).  Last ETOH= June 2023.  Will add rifaximin to optimize management of hepatic encephalopathy.  Will need cardiology evaluation and completion of chemical dependency recommendations prior to listing for liver transplant.  Up-to-date with surveillance of esophageal varices.  Up-to-date with HCC screening.    We discussed the indications for liver transplant, liver transplant evaluation process, MELD 3.0 and liver transplant waiting list and the importance of complete sobriety from alcohol with regard to preservation of liver function and preservation of potential allograft function.    Plan  1. Complete abstinence from alcohol  2. Low Na diet  3. Continue torsemide  4. Continue lactulose  5. Add rifaximin 550mg PO BID  6. Transplant  social work evaluation  7. Cardiology evaluation  8. Follow-up in 3 months    Hugo Muniz MD  Hepatology  [facility] spent 60 minutes on the date of the encounter doing chart review, history and exam, documentation and further activities as noted above.    On 12/18/2023, approximately 35 minutes of non face-to-face time were spent in review of the patient's medical record to date.  This included review of previous: clinic visits, hospital records, lab results, imaging studies, and procedural documentation.  The findings from this review are summarized in the above note.

## 2023-12-20 LAB
ATRIAL RATE - MUSE: 96 BPM
DIASTOLIC BLOOD PRESSURE - MUSE: NORMAL MMHG
ETHYL GLUCURONIDE UR QL SCN: NEGATIVE NG/ML
GAMMA INTERFERON BACKGROUND BLD IA-ACNC: 0.08 IU/ML
INTERPRETATION ECG - MUSE: NORMAL
M TB IFN-G BLD-IMP: ABNORMAL
M TB IFN-G CD4+ BCKGRND COR BLD-ACNC: 0.13 IU/ML
MITOGEN IGNF BCKGRD COR BLD-ACNC: 0 IU/ML
MITOGEN IGNF BCKGRD COR BLD-ACNC: 0 IU/ML
P AXIS - MUSE: -30 DEGREES
PR INTERVAL - MUSE: 142 MS
QRS DURATION - MUSE: 92 MS
QT - MUSE: 346 MS
QTC - MUSE: 437 MS
QUANTIFERON MITOGEN: 0.21 IU/ML
QUANTIFERON NIL TUBE: 0.08 IU/ML
QUANTIFERON TB1 TUBE: 0.08 IU/ML
QUANTIFERON TB2 TUBE: 0.08
R AXIS - MUSE: -29 DEGREES
SYSTOLIC BLOOD PRESSURE - MUSE: NORMAL MMHG
T AXIS - MUSE: 150 DEGREES
VENTRICULAR RATE- MUSE: 96 BPM

## 2023-12-21 ENCOUNTER — TELEPHONE (OUTPATIENT)
Dept: GASTROENTEROLOGY | Facility: CLINIC | Age: 53
End: 2023-12-21
Payer: COMMERCIAL

## 2023-12-21 NOTE — TELEPHONE ENCOUNTER
Prior Authorization Retail Medication Request    Medication/Dose: Xifaxan 550 mg tab  Diagnosis and ICD code (if different than what is on RX):  Hepatic encephalopathy  New/renewal/insurance change PA/secondary ins. PA:  Previously Tried and Failed:  Lactulose alone  Rationale:  Xifaxan helps to lower the toxin build up in the blood while lactulose works by cleansing the toxin build up in the liver. Adjunctive therapy.      Insurance   Primary:   Insurance ID:      Secondary (if applicable):  Insurance ID:      Pharmacy Information (if different than what is on RX)  Name:    Phone:    Fax:

## 2023-12-21 NOTE — TELEPHONE ENCOUNTER
Central Prior Authorization Team   Phone: [phone]    PA Initiation    Medication: Xifaxan 550 mg tab  Insurance Company: [employer] - Phone [phone] Fax [phone]  Pharmacy Filling the Rx: [id number] - [place], [place] - [address]  Filling Pharmacy Phone: [phone]  Filling Pharmacy Fax:    Start Date: 12/21/2023

## 2023-12-22 ENCOUNTER — TELEPHONE (OUTPATIENT)
Dept: CARDIOLOGY | Facility: CLINIC | Age: 53
End: 2023-12-22

## 2023-12-22 ENCOUNTER — TELEPHONE (OUTPATIENT)
Dept: GASTROENTEROLOGY | Facility: CLINIC | Age: 53
End: 2023-12-22

## 2023-12-22 ENCOUNTER — OFFICE VISIT (OUTPATIENT)
Dept: FAMILY MEDICINE | Facility: CLINIC | Age: 53
End: 2023-12-22
Payer: COMMERCIAL

## 2023-12-22 ENCOUNTER — ANCILLARY PROCEDURE (OUTPATIENT)
Dept: GENERAL RADIOLOGY | Facility: CLINIC | Age: 53
End: 2023-12-22
Attending: INTERNAL MEDICINE
Payer: COMMERCIAL

## 2023-12-22 VITALS
HEIGHT: 69 IN | HEART RATE: 97 BPM | DIASTOLIC BLOOD PRESSURE: 73 MMHG | OXYGEN SATURATION: 95 % | SYSTOLIC BLOOD PRESSURE: 112 MMHG | WEIGHT: 222 LBS | BODY MASS INDEX: 32.88 KG/M2 | RESPIRATION RATE: 15 BRPM | TEMPERATURE: 98.6 F

## 2023-12-22 DIAGNOSIS — Z79.4 TYPE 2 DIABETES MELLITUS WITH HYPERGLYCEMIA, WITH LONG-TERM CURRENT USE OF INSULIN (H): ICD-10-CM

## 2023-12-22 DIAGNOSIS — S49.91XA SHOULDER INJURY, RIGHT, INITIAL ENCOUNTER: ICD-10-CM

## 2023-12-22 DIAGNOSIS — K72.90 DECOMPENSATED HEPATIC CIRRHOSIS (H): ICD-10-CM

## 2023-12-22 DIAGNOSIS — E11.65 TYPE 2 DIABETES MELLITUS WITH HYPERGLYCEMIA, WITH LONG-TERM CURRENT USE OF INSULIN (H): ICD-10-CM

## 2023-12-22 DIAGNOSIS — Z23 ENCOUNTER FOR IMMUNIZATION: ICD-10-CM

## 2023-12-22 DIAGNOSIS — K74.60 DECOMPENSATED HEPATIC CIRRHOSIS (H): ICD-10-CM

## 2023-12-22 DIAGNOSIS — K70.11 ALCOHOLIC HEPATITIS WITH ASCITES (H): Primary | Chronic | ICD-10-CM

## 2023-12-22 PROBLEM — K70.30 ALCOHOLIC CIRRHOSIS OF LIVER WITHOUT ASCITES (H): Status: RESOLVED | Noted: 2023-07-13 | Resolved: 2023-12-22

## 2023-12-22 LAB — HBA1C MFR BLD: 7.7 %

## 2023-12-22 PROCEDURE — 90472 IMMUNIZATION ADMIN EACH ADD: CPT | Performed by: INTERNAL MEDICINE

## 2023-12-22 PROCEDURE — 90686 IIV4 VACC NO PRSV 0.5 ML IM: CPT | Performed by: INTERNAL MEDICINE

## 2023-12-22 PROCEDURE — 90677 PCV20 VACCINE IM: CPT | Performed by: INTERNAL MEDICINE

## 2023-12-22 PROCEDURE — 90471 IMMUNIZATION ADMIN: CPT | Performed by: INTERNAL MEDICINE

## 2023-12-22 PROCEDURE — 91320 SARSCV2 VAC 30MCG TRS-SUC IM: CPT | Performed by: INTERNAL MEDICINE

## 2023-12-22 PROCEDURE — 73030 X-RAY EXAM OF SHOULDER: CPT | Mod: TC | Performed by: RADIOLOGY

## 2023-12-22 PROCEDURE — 90480 ADMN SARSCOV2 VAC 1/ONLY CMP: CPT | Performed by: INTERNAL MEDICINE

## 2023-12-22 PROCEDURE — 99214 OFFICE O/P EST MOD 30 MIN: CPT | Mod: 25 | Performed by: INTERNAL MEDICINE

## 2023-12-22 RX ORDER — TORSEMIDE 10 MG/1
20 TABLET ORAL DAILY
Qty: 180 TABLET | Refills: 1 | Status: SHIPPED | OUTPATIENT
Start: 2023-12-22 | End: 2024-01-22

## 2023-12-22 ASSESSMENT — PAIN SCALES - GENERAL: PAINLEVEL: NO PAIN (0)

## 2023-12-22 NOTE — TELEPHONE ENCOUNTER
Left Voicemail (1st Attempt) (mobile device) for the patient to call back and reschedule the following:    Appointment type: New Cardiology  Provider: Dr. Tran  Return date: 12/26/2023 (held slot at 8AM)  Specialty phone number: [phone] option 1  Additional appointment(s) needed: CTA Coronary Angiogram will need to be rescheduled to closer to the new appt.   Additonal Notes: 12/22 LVM and MYC for pt to call back to reschedule New Cardiology w/ Dr. Tran on 12/26 at 8AM. Will also need to reschedule CTA Coronary angiogram to closer to the new appt. MB

## 2023-12-22 NOTE — PROGRESS NOTES
Prior to immunization administration, verified patients identity using patient s name and date of birth. Please see Immunization Activity for additional information.     Screening Questionnaire for Adult Immunization    Are you sick today?   No   Do you have allergies to medications, food, a vaccine component or latex?   No   Have you ever had a serious reaction after receiving a vaccination?   No   Do you have a long-term health problem with heart, lung, kidney, or metabolic disease (e.g., diabetes), asthma, a blood disorder, no spleen, complement component deficiency, a cochlear implant, or a spinal fluid leak?  Are you on long-term aspirin therapy?   Yes   Do you have cancer, leukemia, HIV/AIDS, or any other immune system problem?   No   Do you have a parent, brother, or sister with an immune system problem?   No   In the past 3 months, have you taken medications that affect  your immune system, such as prednisone, other steroids, or anticancer drugs; drugs for the treatment of rheumatoid arthritis, Crohn s disease, or psoriasis; or have you had radiation treatments?   No   Have you had a seizure, or a brain or other nervous system problem?   No   During the past year, have you received a transfusion of blood or blood    products, or been given immune (gamma) globulin or antiviral drug?   Yes   For women: Are you pregnant or is there a chance you could become       pregnant during the next month?   No   Have you received any vaccinations in the past 4 weeks?   No     Immunization questionnaire was positive for at least one answer.  Notified Dr. Hoyt.      Patient instructed to remain in clinic for 15 minutes afterwards, and to report any adverse reactions.     Screening performed by Marine Edwards MA on 12/22/2023 at 2:29 PM.

## 2023-12-22 NOTE — TELEPHONE ENCOUNTER
"Endoscopy Scheduling Screen    Have you had a positive Covid test in the last 14 days?  No    Are you active on MyChart?   Yes    What insurance is in the chart?  Other:  [employer]    Ordering/Referring Provider:   JOSTIN ABRAHAM        (If ordering provider performs procedure, schedule with ordering provider unless otherwise instructed. )    BMI: Estimated body mass index is 34.08 kg/m  as calculated from the following:    Height as of 12/19/23: 1.753 m (5' 9\").    Weight as of 12/19/23: 104.7 kg (230 lb 12.8 oz).     Sedation Ordered  general anesthesia.   BMI<= 45 45 < BMI <= 48 48 < BMI < = 50  BMI > 50   No Restrictions No [facility]  No [facility]  [facility] with exceptions Hospital Only OR Only       Are you taking any prescription medications for pain 3 or more times per week?   NO - No RN review required.    Do you have a history of malignant hyperthermia or adverse reaction to anesthesia?  No    (Females) Are you currently pregnant?   No     Have you been diagnosed or told you have pulmonary hypertension?   No    Do you have an LVAD?  No    Have you been told you have moderate to severe sleep apnea?  No    Have you been told you have COPD, asthma, or any other lung disease?  No    Do you have any heart conditions?  No     Have you ever had an organ transplant?   No    Have you ever had or are you awaiting a heart or lung transplant?   No    Have you had a stroke or transient ischemic attack (TIA aka \"mini stroke\" in the last 6 months?   No    Have you been diagnosed with or been told you have cirrhosis of the liver?   Yes (RN Review required for scheduling unless scheduling in Hospital.)    Are you currently on dialysis?   No    Do you need assistance transferring?   No    BMI: Estimated body mass index is 34.08 kg/m  as calculated from the following:    Height as of 12/19/23: 1.753 m (5' 9\").    Weight as of 12/19/23: 104.7 kg (230 lb 12.8 oz).     Is patients BMI > 40 and scheduling location " [facility]?  No    Do you take an injectable medication for weight loss or diabetes (excluding insulin)?  Yes, hold time can be up to 7 days. Please check with you prescribing provider for recommendation.     Do you take the medication Naltrexone?  No    Do you take blood thinners?  No       Prep   Are you currently on dialysis or do you have chronic kidney disease?  No    Do you have a diagnosis of diabetes?  Yes (Golytely Prep)    Do you have a diagnosis of cystic fibrosis (CF)?  No    On a regular basis do you go 3 -5 days between bowel movements?  No    BMI > 40?  No    Preferred Pharmacy:        [id number] [place], [place] - [address] [place] [address] AT [facility]  [address] [place] [id number]  Phone: [phone] Fax: [phone]          Final Scheduling Details   Colonoscopy prep sent?  Standard Golytely    Procedure scheduled  Colonoscopy    Surgeon:  Carlitos ---patient wanted soonest available.    Date of procedure:  1/2/24     Pre-OP / PAC:   No - Not required for this site.    Location  [facility] - Patient preference.    Sedation   General Anesthesia - Per order.      Patient Reminders:   You will receive a call from a Nurse to review instructions and health history.  This assessment must be completed prior to your procedure.  Failure to complete the Nurse assessment may result in the procedure being cancelled.      On the day of your procedure, please designate an adult(s) who can drive you home stay with you for the next 24 hours. The medicines used in the exam will make you sleepy. You will not be able to drive.      You cannot take public transportation, ride share services, or non-medical taxi service without a responsible caregiver.  Medical transport services are allowed with the requirement that a responsible caregiver will receive you at your destination.  We require that drivers and caregivers are confirmed prior to your procedure.

## 2023-12-22 NOTE — TELEPHONE ENCOUNTER
----- Message from Anitra Gonzalez RN sent at 12/21/2023  4:43 PM CST -----  Regarding: RE: push to supervisor please - expedited MD appt request  CAC team,    Please offer patient an appt with Dr. Tran next week Tuesday. I have placed a hold on the spot.    Thank you!  Richard  ----- Message -----  From: Keaton Martinez Jr., RN  Sent: 12/21/2023   2:47 PM CST  To: Kirstie Rodriguez; Anitra Denny, RN; #  Subject: push to supervisor please - expedited MD emy#    Good afternoon    This is my patient with request to expedite getting in to see cardiology.    Patient with very high MELD (aka very sick) and could use cards sometime in next 2-3 weeks.    Patient had echo done other day, and will need CTA moved up once we know when he will see cards MD.    Thank you all. Happy Holidays, tk    Keaton Martinez Jr., BSN, RN  Liver Transplant Coordinator  [phone]      ----- Message -----  From: Anitra Denny RN  Sent: 12/20/2023   7:09 PM CST  To: Kirstie Rodriguez; Sayda Herrera, RN; #  Subject: RE: Liver transplant evaluations that we nee#    Hi Valentino,    Thanks for the message.  These next several weeks are really tight, but I think we can make some of these work.   I'm sorry to ask, but could you send these each as individual messages on each patient with if they need testing or already complete? Sorry to ask, but this will help us do this more quickly and efficiently with them separately.    Thanks!  Antolin    ----- Message -----  From: Kirstie Rodriguez  Sent: 12/20/2023   4:29 PM CST  To: Anitra Denny RN; Anitra Gonzalez RN  Subject: FW: Liver transplant evaluations that we nee#    Hi,    Looks like a lot of these are already scheduled and the soonest availability I'm seeing IS late February. Is there any way some of these pts can be seen sooner?    Thanks,    Kirstie    ----- Message -----  From: Sayda Herrera RN  Sent: 12/20/2023  12:20 PM CST  To: Tomas Berrios RN; Keaton Martinez Jr.,  RN; #  Subject: Liver transplant evaluations that we need as#    To our cardiology scheduling colleagues-    We have had quite an influx of higher MELD, very sick patients in the last couple weeks.  In an effort to hopefully consolidate requests.  I'm including all of our requests in one message. We are hoping that we can get some assistance with getting these patients in with cardiology ASAP for urgent liver transplant evaluation.  As always, we appreciate your ongoing collaboration.    These are the patients, in priority order, that need to be seen as soon as possible:     Mary Lopez MRN [id number]- needs to be seen in next 2-3 weeks, very high MELD score    Cholo Godfrey [id number], currently scheduled late February, needs to be seen within few weeks ideally    Ora Gamino [id number], ideally needs to be seen within next couple weeks- was offered late February schedule date.     James Childs MRN [id number]-     Melyssa Phillip [id number] - scheduled 1/31, trying to move up    Let us know what we can do to help facilitate getting these folks in.  Thanks!!    -Amadeo Marin, and Dora- Pre-Liver Transplant Coordinators    Dora Herrera, RN, BSN  Pre-Liver Transplant Coordinator  Phone: [phone]

## 2023-12-22 NOTE — TELEPHONE ENCOUNTER
Left Voicemail (1st Attempt) and Sent Mychart (1st Attempt) for the patient to call back and schedule the following:    Appointment type: New Cardiology  Provider: Dr. Tran  Return date: 12/26/2023 (held appt slot at 8AM)  Specialty phone number: [phone] option 1  Additional appointment(s) needed: Need to reschedule Coronary Angiogram CTA to closer to the new appt  Additonal Notes: 12/22 LVM and MYC for pt to call back to reschedule New Cardiology w/ Dr. Tran on 12/26 at 8AM. Will also need to reschedule CTA Coronary Angiogram to sooner to the new appt. MB

## 2023-12-22 NOTE — TELEPHONE ENCOUNTER
Prior Authorization Approval    Authorization Effective Date: 12/21/2023  Authorization Expiration Date: 12/20/2024  Medication: Xifaxan 550 mg tab  Reference #:     Insurance Company: [employer] - Phone [phone] Fax [phone]  Which Pharmacy is filling the prescription (Not needed for infusion/clinic administered): [id number] - [place], [place] - [address] AT [facility]  Pharmacy Notified: Yes  Patient Notified: Instructed pharmacy to notify patient when script is ready to /ship.

## 2023-12-22 NOTE — PROGRESS NOTES
Assessment & Plan     1.  Alcoholic hepatitis with ascites.  Also has quite a bit of leg swelling.  Currently on torsemide 10 mg a day.  I am going to increase it to 20 mg a day.  He gets lab weekly.  2.  Decompensated hepatic cirrhosis.  Recently evaluated by transplant team.  Workup has already been initiated.  EKG and echocardiogram look normal recently.  3.  Right shoulder injury 2 months ago.  Initial encounter.  X-ray performed today and looks normal.  Suspect he has rotator cuff injury.  Unfortunately there is not much that can be done right now for it.  4.  Type 2 diabetes mellitus with hyperglycemia.  He is under management of endocrinology and insulin is being adjusted.  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  Will have A1c checked.        Jimmie Hoyt MD  [facility] MAYA Hernandez is a 53 year old, presenting for the following health issues:  Diabetes and Liver      12/22/2023     1:23 PM   Additional Questions   Roomed by ritu   Accompanied by Adina- Wife     Patient wants to discuss and talk about His Liver Failure  Patient wants to discuss recent EKG    History of Present Illness       Reason for visit:  Follow up on various health issues    He eats 2-3 servings of fruits and vegetables daily.He consumes 1 sweetened beverage(s) daily.He exercises with enough effort to increase his heart rate 9 or less minutes per day.  He exercises with enough effort to increase his heart rate 3 or less days per week.   He is taking medications regularly.         Diabetes Follow-up    How often are you checking your blood sugar? Continuous glucose monitor  What time of day are you checking your blood sugars (select all that apply)?   He wears a monitor so check it all the time  Have you had any blood sugars above 200?  Yes   Have you had any blood sugars below 70?  Yes   What symptoms do you notice when your blood sugar is low?  Usually happens at night  What  Problem: Nutrition/Hydration-ADULT  Goal: Nutrient/Hydration intake appropriate for improving, restoring or maintaining nutritional needs  Monitor and assess patient's nutrition/hydration status for malnutrition (ex- brittle hair, bruises, dry skin, pale skin and conjunctiva, muscle wasting, smooth red tongue, and disorientation)  Collaborate with interdisciplinary team and initiate plan and interventions as ordered  Monitor patient's weight and dietary intake as ordered or per policy  Utilize nutrition screening tool and intervene per policy  Determine patient's food preferences and provide high-protein, high-caloric foods as appropriate       INTERVENTIONS:  - Monitor oral intake, urinary output, labs, and treatment plans  - Assess nutrition and hydration status and recommend course of action  - Evaluate amount of meals eaten  - Assist patient with eating if necessary   - Allow adequate time for meals  - Recommend/ encourage appropriate diets, oral nutritional supplements, and vitamin/mineral supplements  - Order, calculate, and assess calorie counts as needed  - Recommend, monitor, and adjust tube feedings and TPN/PPN based on assessed needs  - Assess need for intravenous fluids  - Provide specific nutrition/hydration education as appropriate  - Include patient/family/caregiver in decisions related to nutrition   Outcome: Progressing "concerns do you have today about your diabetes? None   Do you have any of these symptoms? (Select all that apply)  No numbness or tingling in feet.  No redness, sores or blisters on feet.  No complaints of excessive thirst.  No reports of blurry vision.  No significant changes to weight.  Have you had a diabetic eye exam in the last 12 months? No        BP Readings from Last 2 Encounters:   12/22/23 112/73   12/19/23 101/66     Hemoglobin A1C (%)   Date Value   10/31/2023 8.1 (H)   08/23/2023 5.1     LDL Cholesterol Calculated (mg/dL)   Date Value   03/12/2022 102 (H)   12/04/2021 120 (H)   07/05/2020 87               Review of Systems   Constitutional, HEENT, cardiovascular, pulmonary, GI, , musculoskeletal, neuro, skin, endocrine and psych systems are negative, except as otherwise noted.      Objective    /73 (BP Location: Left arm, Patient Position: Sitting, Cuff Size: Adult Large)   Pulse 97   Temp 98.6  F (37  C) (Oral)   Resp 15   Ht 1.74 m (5' 8.5\")   Wt 100.7 kg (222 lb)   SpO2 95%   BMI 33.26 kg/m    Body mass index is 33.26 kg/m .  Physical Exam   GENERAL: healthy, alert and no distress  EYES: Eyes grossly normal to inspection and sclera is icteric.  NECK: no adenopathy, no asymmetry, masses, or scars and thyroid normal to palpation  RESP: lungs clear to auscultation - no rales, rhonchi or wheezes  CV: regular rate and rhythm, normal S1 S2, no S3 or S4, no murmur, click or rub, no peripheral edema and peripheral pulses strong  ABDOMEN: soft, nontender, without hepatosplenomegaly or masses, bowel sounds normal, and abdomen slightly distended.  Slight shifting dullness suggestive of ascites.  MS: Right shoulder examination reveals no swelling.  Mild tenderness.  But abduction internal rotation and external rotation is severely restricted.      X-ray of the right shoulder performed I later reviewed it and it looked normal.                "

## 2023-12-26 ENCOUNTER — LAB (OUTPATIENT)
Dept: LAB | Facility: CLINIC | Age: 53
End: 2023-12-26
Payer: COMMERCIAL

## 2023-12-26 ENCOUNTER — MYC MEDICAL ADVICE (OUTPATIENT)
Dept: FAMILY MEDICINE | Facility: CLINIC | Age: 53
End: 2023-12-26

## 2023-12-26 ENCOUNTER — OFFICE VISIT (OUTPATIENT)
Dept: CARDIOLOGY | Facility: CLINIC | Age: 53
End: 2023-12-26
Attending: INTERNAL MEDICINE
Payer: COMMERCIAL

## 2023-12-26 ENCOUNTER — HOSPITAL ENCOUNTER (OUTPATIENT)
Facility: CLINIC | Age: 53
End: 2023-12-26
Attending: INTERNAL MEDICINE | Admitting: INTERNAL MEDICINE
Payer: COMMERCIAL

## 2023-12-26 VITALS
SYSTOLIC BLOOD PRESSURE: 101 MMHG | HEIGHT: 68 IN | BODY MASS INDEX: 31.27 KG/M2 | DIASTOLIC BLOOD PRESSURE: 69 MMHG | WEIGHT: 206.3 LBS | OXYGEN SATURATION: 100 % | HEART RATE: 99 BPM

## 2023-12-26 DIAGNOSIS — D64.9 ANEMIA, UNSPECIFIED TYPE: ICD-10-CM

## 2023-12-26 DIAGNOSIS — I27.20 PULMONARY HTN (H): Primary | ICD-10-CM

## 2023-12-26 DIAGNOSIS — K76.6 PORTAL HYPERTENSION (H): ICD-10-CM

## 2023-12-26 DIAGNOSIS — E87.1 HYPONATREMIA: ICD-10-CM

## 2023-12-26 DIAGNOSIS — K70.31 ALCOHOLIC CIRRHOSIS OF LIVER WITH ASCITES (H): Primary | ICD-10-CM

## 2023-12-26 DIAGNOSIS — K70.11 ALCOHOLIC HEPATITIS WITH ASCITES (H): Primary | ICD-10-CM

## 2023-12-26 DIAGNOSIS — D64.9 ANEMIA DUE TO UNKNOWN MECHANISM: ICD-10-CM

## 2023-12-26 DIAGNOSIS — I27.20 PULMONARY HTN (H): ICD-10-CM

## 2023-12-26 DIAGNOSIS — K70.31 ALCOHOLIC CIRRHOSIS OF LIVER WITH ASCITES (H): ICD-10-CM

## 2023-12-26 LAB
ANION GAP SERPL CALCULATED.3IONS-SCNC: 8 MMOL/L (ref 7–15)
BASOPHILS # BLD AUTO: 0.1 10E3/UL (ref 0–0.2)
BASOPHILS NFR BLD AUTO: 1 %
BUN SERPL-MCNC: 7 MG/DL (ref 6–20)
CALCIUM SERPL-MCNC: 8.9 MG/DL (ref 8.6–10)
CHLORIDE SERPL-SCNC: 99 MMOL/L (ref 98–107)
CREAT SERPL-MCNC: 0.65 MG/DL (ref 0.67–1.17)
DEPRECATED HCO3 PLAS-SCNC: 29 MMOL/L (ref 22–29)
EGFRCR SERPLBLD CKD-EPI 2021: >90 ML/MIN/1.73M2
EOSINOPHIL # BLD AUTO: 0.3 10E3/UL (ref 0–0.7)
EOSINOPHIL NFR BLD AUTO: 4 %
ERYTHROCYTE [DISTWIDTH] IN BLOOD BY AUTOMATED COUNT: 17.8 % (ref 10–15)
GLUCOSE SERPL-MCNC: 136 MG/DL (ref 70–99)
HCT VFR BLD AUTO: 26.4 % (ref 40–53)
HGB BLD-MCNC: 9.3 G/DL (ref 13.3–17.7)
IMM GRANULOCYTES # BLD: 0.1 10E3/UL
IMM GRANULOCYTES NFR BLD: 1 %
LYMPHOCYTES # BLD AUTO: 1.2 10E3/UL (ref 0.8–5.3)
LYMPHOCYTES NFR BLD AUTO: 18 %
MCH RBC QN AUTO: 35.1 PG (ref 26.5–33)
MCHC RBC AUTO-ENTMCNC: 35.2 G/DL (ref 31.5–36.5)
MCV RBC AUTO: 100 FL (ref 78–100)
MONOCYTES # BLD AUTO: 0.6 10E3/UL (ref 0–1.3)
MONOCYTES NFR BLD AUTO: 9 %
NEUTROPHILS # BLD AUTO: 4.6 10E3/UL (ref 1.6–8.3)
NEUTROPHILS NFR BLD AUTO: 67 %
NRBC # BLD AUTO: 0 10E3/UL
NRBC BLD AUTO-RTO: 0 /100
PLATELET # BLD AUTO: 69 10E3/UL (ref 150–450)
POTASSIUM SERPL-SCNC: 3.6 MMOL/L (ref 3.4–5.3)
RBC # BLD AUTO: 2.65 10E6/UL (ref 4.4–5.9)
SODIUM SERPL-SCNC: 136 MMOL/L (ref 135–145)
WBC # BLD AUTO: 6.8 10E3/UL (ref 4–11)

## 2023-12-26 PROCEDURE — 99204 OFFICE O/P NEW MOD 45 MIN: CPT | Performed by: INTERNAL MEDICINE

## 2023-12-26 PROCEDURE — 99213 OFFICE O/P EST LOW 20 MIN: CPT | Performed by: INTERNAL MEDICINE

## 2023-12-26 PROCEDURE — 80048 BASIC METABOLIC PNL TOTAL CA: CPT | Performed by: PATHOLOGY

## 2023-12-26 PROCEDURE — 36415 COLL VENOUS BLD VENIPUNCTURE: CPT | Performed by: PATHOLOGY

## 2023-12-26 PROCEDURE — 85025 COMPLETE CBC W/AUTO DIFF WBC: CPT | Performed by: PATHOLOGY

## 2023-12-26 RX ORDER — BISACODYL 5 MG/1
TABLET, DELAYED RELEASE ORAL
Qty: 4 TABLET | Refills: 0 | Status: ON HOLD | OUTPATIENT
Start: 2023-12-26 | End: 2024-01-30

## 2023-12-26 RX ORDER — SODIUM CHLORIDE 9 MG/ML
INJECTION, SOLUTION INTRAVENOUS CONTINUOUS
Status: CANCELLED | OUTPATIENT
Start: 2023-12-26

## 2023-12-26 RX ORDER — ASPIRIN 325 MG
325 TABLET ORAL ONCE
Status: CANCELLED | OUTPATIENT
Start: 2023-12-26 | End: 2023-12-26

## 2023-12-26 RX ORDER — POTASSIUM CHLORIDE 1500 MG/1
20 TABLET, EXTENDED RELEASE ORAL
Status: CANCELLED | OUTPATIENT
Start: 2023-12-26

## 2023-12-26 RX ORDER — MULTIVITAMIN,THERAPEUTIC
1 TABLET ORAL DAILY
Status: ON HOLD | COMMUNITY
End: 2024-01-30

## 2023-12-26 RX ORDER — POTASSIUM CHLORIDE 1500 MG/1
40 TABLET, EXTENDED RELEASE ORAL
Status: CANCELLED | OUTPATIENT
Start: 2023-12-26

## 2023-12-26 RX ORDER — LIDOCAINE 40 MG/G
CREAM TOPICAL
Status: CANCELLED | OUTPATIENT
Start: 2023-12-26

## 2023-12-26 RX ORDER — ASPIRIN 81 MG/1
243 TABLET, CHEWABLE ORAL ONCE
Status: CANCELLED | OUTPATIENT
Start: 2023-12-26

## 2023-12-26 ASSESSMENT — PAIN SCALES - GENERAL: PAINLEVEL: NO PAIN (0)

## 2023-12-26 NOTE — PATIENT INSTRUCTIONS
You were seen today in the Pulmonary Hypertension Clinic at the [facility].     Cardiology Provider you saw during your visit:    Dr. Tran    Medication Changes:  No changes    Recommendations:       Follow-up:   Schedule for a right heart catheterization/coronary angiogram last week of January after your coronary CT. If the results of the CT are positive we will add a coronary angiogram to the day of your right heart catheterization.   Follow-up with Dr. Tran after testing        Please call us immediately if you have any syncope (fainting or passing out), chest pain, edema (swelling or weight gain), or decline in your functional status (general decline in how you are feeling).    If you have emergent concerns after hours or on the weekend, please call our on-call Cardiologist at [phone], option 4. For emergencies call [phone].     Thank you for allowing us to be a part of your care here at the [facility]    If you have questions or concerns please contact us at:    Addis Aldrich RN (P: [phone])    Nurse Coordinator       Pulmonary Hypertension     [facility]         RANDI Gibson   (Prior Authorizations)    ()  Clinic   Clinic   Pulmonary Hypertension   Pulmonary Hypertension  [facility]  (P)[phone]    (P) [phone]  (F) [phone]                Follow these instructions:     You are scheduled for a Coronary Angiogram/Right heart catheterization on TBD at Location is [address] Waiting Room  You have a follow-up with  TBD on   At any time, emergencies and/or urgent cases may come up which could delay the start of your procedure.    Pre-procedure instructions - Coronary Angiogram  Shower in the  evening before or the morning of the procedure  Nothing to eat or drink after midnight  You can take your morning medications (except for diabetic and blood thinners) with sips of water.  Take 325 mg of Asprin 24 hours prior to the procedure and the morning of procedure.   You will need to arrange a ride to drop you off and pick you up, as you will be unable to drive home.  Prior to discharge you may be required to lay flat for approximately 2-4 hours in the recovery unit to ensure proper clotting of the artery. You will need a responsible adult to stay with you for 24 hours post-procedure.         Diabetic Medication Instructions  DO NOT TAKE oral diabetic medication in morning of your procedure and for 48 hours after IV contrast is given  Typical instructions for insulin diabetic medication holding are below. However, please reach out to your Primary Care Provider or Endocrinologist for specific instructions  DO NOT TAKE any oral diabetic medication, short-acting diabetes medications/insulin, humalog or regular insulin the morning of your test  Take   dose of long-acting insulin (Lantus, Levemir) the day of your test  Remember to bring your glucometer and insulin with you to take after your test if needed  DO NOT TAKE injectable GLP-1 agonists semaglutide (Ozempic, Wegovy), dulaglutide (Trulicity), exenatide ER (Bydureon), tirzepatide (Mounjaro), or oral semaglutide (Rybelsus) for 7 days prior your procedure  DO NOT TAKE daily injectable GLP-1 agonists exenatide (Byetta), liraglutide (Saxenda, Victoza) the day before and day of your procedure

## 2023-12-26 NOTE — NURSING NOTE
Chief Complaint   Patient presents with    New Patient     New cardiology        Vitals were taken, medications reconciled.    Chantelle Diop CMA  8:03 AM

## 2023-12-26 NOTE — PROGRESS NOTES
December 26, 2023    Dear Colleagues,    I had the pleasure of seeing your patient Mary Lopez at the [facility] Pulmonary Hypertension clinic.  As you know, Mary is a 53 year old with history of alcoholic cirrhosis, hypertension, type 2 diabetes, and concern for pulmonary hypertension who was referred for pre-operative evaluation of potential liver transplant. Mary states that he is not having significant dyspnea when walking over a block or going up a flight of stairs. He denies any chest pain or pressure with that. He does have some lower extremity edema for which he is on torsemide and it is slowly getting better. Overall, I would classify him as WHO FC 2.    Mary was recently diagnosed with alcoholic cirrhosis. He has not had a lot of stigma of chronic cirrhosis including GI bleed or recurrent paracentesis yet. He is on an expedited liver transplant evaluation due to his very high MELD score.       PAST MEDICAL HISTORY:  Past Medical History:   Diagnosis Date    ANGEL (acute kidney injury) (H24)     Alcoholic cirrhosis of liver without ascites (H) 07/13/2023    Alcoholic hepatitis with ascites (H28) 10/03/2023    Alcoholic hepatitis without ascites (H28) 07/13/2023    Closed fracture of one rib of left side 09/14/2023    Concussion without loss of consciousness 03/11/2020    Decompensated hepatic cirrhosis (H) 09/15/2023    Diabetes mellitus, type 2 (H) 11/22/2023    Essential hypertension 03/11/2020    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)     Mild hyperlipidemia 12/07/2021    Persistent insomnia 07/13/2023    Portal hypertension (H) 07/13/2023    Scrotal abscess     Secondary esophageal 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 (H) 07/13/2023    Tobacco abuse disorder 03/11/2020    Type 2 diabetes mellitus with hyperglycemia (H) 12/22/2023       FAMILY HISTORY:  Family History   Problem Relation Age of Onset    Anxiety Disorder Mother     Depression Mother     Bipolar Disorder Mother      Chronic Obstructive Pulmonary Disease Mother     Lung Cancer Mother 81    Morbid Obesity Father     Diabetes Father     Diabetes Type 2  Brother     Substance Abuse Maternal Grandfather     Substance Abuse Paternal Grandfather     Colon Cancer No family hx of     Liver Disease No family hx of        SOCIAL HISTORY:  Social History     Socioeconomic History    Marital status:    Occupational History    Occupation: Not working now   Tobacco Use    Smoking status: Never     Passive exposure: Never    Smokeless tobacco: Current     Types: Chew     Last attempt to quit: 1/1/2004    Tobacco comments:     Chew daily 1/3 of a tin per day   Vaping Use    Vaping Use: Never used   Substance and Sexual Activity    Alcohol use: Not Currently     Alcohol/week: 12.0 standard drinks of alcohol     Types: 12 Standard drinks or equivalent per week     Comment: Sober since 6/25/2023    Drug use: Not Currently    Sexual activity: Yes     Partners: Female     Birth control/protection: Male Surgical   Other Topics Concern    Parent/sibling w/ CABG, MI or angioplasty before 65F 55M? No     Social Determinants of Health     Financial Resource Strain: Low Risk  (12/22/2023)    Financial Resource Strain     Within the past 12 months, have you or your family members you live with been unable to get utilities (heat, electricity) when it was really needed?: No   Food Insecurity: Low Risk  (12/22/2023)    Food Insecurity     Within the past 12 months, did you worry that your food would run out before you got money to buy more?: No     Within the past 12 months, did the food you bought just not last and you didn t have money to get more?: No   Transportation Needs: Low Risk  (12/22/2023)    Transportation Needs     Within the past 12 months, has lack of transportation kept you from medical appointments, getting your medicines, non-medical meetings or appointments, work, or from getting things that you need?: No   Interpersonal Safety:  Low Risk  (12/22/2023)    Interpersonal Safety     Do you feel physically and emotionally safe where you currently live?: Yes     Within the past 12 months, have you been hit, slapped, kicked or otherwise physically hurt by someone?: No     Within the past 12 months, have you been humiliated or emotionally abused in other ways by your partner or ex-partner?: No   Housing Stability: Low Risk  (12/22/2023)    Housing Stability     Do you have housing? : Yes     Are you worried about losing your housing?: No       CURRENT MEDICATIONS:  Current Outpatient Medications   Medication Sig Dispense Refill    blood glucose (NO BRAND SPECIFIED) test strip Use to test blood sugar two times daily or as directed. To accompany: Blood Glucose Monitor Brands: per insurance. 100 strip 6    blood glucose monitoring (NO BRAND SPECIFIED) meter device kit Use to test blood sugar twice times daily or as directed. Preferred blood glucose meter OR supplies to accompany: Blood Glucose Monitor Brands: per insurance. 1 kit 0    Continuous Blood Gluc Sensor (DEXCOM G7 SENSOR) MISC Change every 10 days. 3 each 5    cyclobenzaprine (FLEXERIL) 10 MG tablet TAKE 1 TABLET(10 MG) BY MOUTH THREE TIMES DAILY AS NEEDED FOR MUSCLE SPASMS 30 tablet 1    doxepin (SINEQUAN) 10 MG capsule TAKE 1 CAPSULE(10 MG) BY MOUTH AT BEDTIME 180 capsule 2    ferrous sulfate (FEROSUL) 325 (65 Fe) MG tablet Take 1 tablet (325 mg) by mouth daily (with breakfast) 90 tablet 3    insulin degludec (TRESIBA FLEXTOUCH) 100 UNIT/ML pen Inject 40-50 Units Subcutaneous daily Inject 40 units daily.  Increase by 1 unit daily until fasting BG most often <150 as long as this does not lead to overnight or early morning low BG <70. 60 mL 1    Insulin Lispro (HUMALOG KWIKPEN) 200 UNIT/ML soln Inject 10-18 Units Subcutaneous 4 times daily (with meals and nightly) Give 10 units plus sliding scale to correct any premeal blood sugars >175. 60 mL 1    lactulose (CHRONULAC) 10 GM/15ML solution  "TAKE 30 MLS BY MOUTH 2 TIMES DAILY 473 mL 11    melatonin 10 MG TABS tablet Take 1 tablet (10 mg) by mouth nightly as needed for sleep      metFORMIN (GLUCOPHAGE XR) 500 MG 24 hr tablet Take 1 tablet (500 mg) by mouth daily (with dinner) 60 tablet 1    multivitamin w/minerals (THERA-VIT-M) tablet Take 1 tablet by mouth daily 90 tablet 1    omeprazole (PRILOSEC) 20 MG DR capsule Take 1 capsule (20 mg) by mouth 2 times daily 180 capsule 1    potassium chloride ER (KLOR-CON M) 10 MEQ CR tablet Take 1 tablet (10 mEq) by mouth 2 times daily 180 tablet 1    rifaximin (XIFAXAN) 550 MG TABS tablet Take 1 tablet (550 mg) by mouth 2 times daily for 180 days 120 tablet 2    thin (NO BRAND SPECIFIED) lancets Use with lanceting device. To accompany: Blood Glucose Monitor Brands: per insurance. 100 each 6    torsemide (DEMADEX) 10 MG tablet Take 2 tablets (20 mg) by mouth daily 180 tablet 1    zinc oxide (DESITIN) 20 % external ointment Apply topically as needed for dry skin or irritation         ROS:   10 point ROS negative except HPI    EXAM:  /69 (BP Location: Right arm, Patient Position: Sitting, Cuff Size: Adult Regular)   Pulse 99   Ht 1.73 m (5' 8.11\")   Wt 93.6 kg (206 lb 4.8 oz)   SpO2 100%   BMI 31.27 kg/m    General: appears comfortable, alert and articulate  Head: normocephalic, atraumatic  Eyes: sclera icterus, EOMI  Neck: no adenopathy  Orophyarynx: moist mucosa, no lesions, dentition intact  Heart: regular, S1/S2, 2/6 SHREYA heart at the RUSB, no gallop/rub, estimated JVP 9  Lungs: clear, no rales or wheezing  Abdomen: soft, non-tender, ascites appreciated  Extremities: 1+ pitting edema bilaterally  Neurological: normal speech and affect, no gross motor deficits    Labs:  CBC RESULTS:  Lab Results   Component Value Date    WBC 4.9 12/19/2023    WBC 10.8 12/19/2006    RBC 2.60 (L) 12/19/2023    RBC 5.56 12/19/2006    HGB 8.8 (L) 12/19/2023    HGB 16.9 12/19/2006    HCT 25.5 (L) 12/19/2023    HCT 47.5 " 12/19/2006    MCV 98 12/19/2023    MCV 85 12/19/2006    MCH 33.8 (H) 12/19/2023    MCH 30.4 12/19/2006    MCHC 34.5 12/19/2023    MCHC 35.6 12/19/2006    RDW 16.7 (H) 12/19/2023    RDW 10.7 12/19/2006    PLT 68 (L) 12/19/2023     12/19/2006       CMP RESULTS:  Lab Results   Component Value Date     (L) 12/19/2023     07/05/2020    POTASSIUM 4.0 12/19/2023    POTASSIUM 3.8 03/12/2022    POTASSIUM 4.2 07/05/2020    CHLORIDE 94 (L) 12/19/2023    CHLORIDE 101 03/12/2022    CHLORIDE 102 07/05/2020    CO2 30 (H) 12/19/2023    CO2 25 03/12/2022    CO2 28 07/05/2020    ANIONGAP 7 12/19/2023    ANIONGAP 10 03/12/2022    ANIONGAP 9 07/05/2020     (H) 12/19/2023     (H) 03/12/2022     (H) 07/05/2020    BUN 11.1 12/19/2023    BUN 11 03/12/2022    BUN 13 07/05/2020    CR 0.78 12/19/2023    CR 0.95 07/05/2020    GFRESTIMATED >90 12/19/2023    GFRESTIMATED >90 07/05/2020    GFRESTBLACK >90 07/05/2020    MEG 8.5 (L) 12/19/2023    MEG 9.7 07/05/2020    BILITOTAL 9.8 (H) 12/19/2023    BILITOTAL 0.8 12/19/2006    ALBUMIN 2.4 (L) 12/19/2023    ALBUMIN 4.0 09/03/2022    ALBUMIN 5.2 (H) 12/19/2006    ALKPHOS 191 (H) 12/19/2023    ALKPHOS 117 12/19/2006    ALT 40 12/19/2023    ALT 50 12/19/2006     (H) 12/19/2023    AST 52 12/19/2006        Echocardiogram 12/23:  Global and regional left ventricular function is normal with an EF of 60-65%.  Global right ventricular function is normal.  Mild tricuspid and mitral insufficiency present.  The right ventricular systolic pressure is approximated at 37 mmHg (upper limits of normal).  Estimated mean right atrial pressure is normal.  Trivial pericardial effusion is present.  Ascites is noted.    Assessment and Plan: Mary Lopez is a 53 year old with alcoholic cirrhosis who presents for pre-operative risk stratification.     Pre-operative risk stratification for liver transplantation: Mary is already scheduled for a CT coronary angiogram.  We will also perform a hemodynamic assessment to rule out significant pulmonary vascular disease. We will order his hemodynamic study with a coronary angiogram but if the CTA coronary angiogram is not concerning, we will not proceed with coronary angiogram. We will work to arrange FFP infusion prior to the RHC as his INR is elevated from his cirrhosis.     It was a pleasure seeing your patient Mary Lopez at the [facility] Pulmonary Hypertension clinic.  Please contact us with any questions or concerns that you may have.    Sincerely,    Steven Tran MD, PhD, FAHA  Associate Professor of Medicine  Director, Chronic Thromboembolic Pulmonary Hypertension Program  Cardiovascular Division      I spent a total of 45 minutes on the date of service evaluating this patient which included face to face discussion, performing a physical exam, reviewing of the chart to gain information from other providers to obtain further history, personally reviewing echocardiograms, CT scans, pulmonary function tests, invasive pulmonary angiograms, and hemodynamic tracings, ordering tests and/or medications, and documenting clinical information in the electronic health record.

## 2023-12-26 NOTE — LETTER
12/26/2023      RE: Mary Lopez  [address]  [place] [address]       Dear Colleague,    Thank you for the opportunity to participate in the care of your patient, Mary Lopez, at the [facility] [place] [facility]. Please see a copy of my visit note below.    December 26, 2023    Dear Colleagues,    I had the pleasure of seeing your patient Mary Lopez at the [facility] Pulmonary Hypertension clinic.  As you know, Mary is a 53 year old with history of alcoholic cirrhosis, hypertension, type 2 diabetes, and concern for pulmonary hypertension who was referred for pre-operative evaluation of potential liver transplant. Mary states that he is not having significant dyspnea when walking over a block or going up a flight of stairs. He denies any chest pain or pressure with that. He does have some lower extremity edema for which he is on torsemide and it is slowly getting better. Overall, I would classify him as WHO FC 2.    Mary was recently diagnosed with alcoholic cirrhosis. He has not had a lot of stigma of chronic cirrhosis including GI bleed or recurrent paracentesis yet. He is on an expedited liver transplant evaluation due to his very high MELD score.       PAST MEDICAL HISTORY:  Past Medical History:   Diagnosis Date    ANGEL (acute kidney injury) (H24)     Alcoholic cirrhosis of liver without ascites (H) 07/13/2023    Alcoholic hepatitis with ascites (H28) 10/03/2023    Alcoholic hepatitis without ascites (H28) 07/13/2023    Closed fracture of one rib of left side 09/14/2023    Concussion without loss of consciousness 03/11/2020    Decompensated hepatic cirrhosis (H) 09/15/2023    Diabetes mellitus, type 2 (H) 11/22/2023    Essential hypertension 03/11/2020    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)     Mild hyperlipidemia 12/07/2021    Persistent insomnia 07/13/2023    Portal  hypertension (H) 07/13/2023    Scrotal abscess     Secondary esophageal 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 (H) 07/13/2023    Tobacco abuse disorder 03/11/2020    Type 2 diabetes mellitus with hyperglycemia (H) 12/22/2023       FAMILY HISTORY:  Family History   Problem Relation Age of Onset    Anxiety Disorder Mother     Depression Mother     Bipolar Disorder Mother     Chronic Obstructive Pulmonary Disease Mother     Lung Cancer Mother 81    Morbid Obesity Father     Diabetes Father     Diabetes Type 2  Brother     Substance Abuse Maternal Grandfather     Substance Abuse Paternal Grandfather     Colon Cancer No family hx of     Liver Disease No family hx of        SOCIAL HISTORY:  Social History     Socioeconomic History    Marital status:    Occupational History    Occupation: Not working now   Tobacco Use    Smoking status: Never     Passive exposure: Never    Smokeless tobacco: Current     Types: Chew     Last attempt to quit: 1/1/2004    Tobacco comments:     Chew daily 1/3 of a tin per day   Vaping Use    Vaping Use: Never used   Substance and Sexual Activity    Alcohol use: Not Currently     Alcohol/week: 12.0 standard drinks of alcohol     Types: 12 Standard drinks or equivalent per week     Comment: Sober since 6/25/2023    Drug use: Not Currently    Sexual activity: Yes     Partners: Female     Birth control/protection: Male Surgical   Other Topics Concern    Parent/sibling w/ CABG, MI or angioplasty before 65F 55M? No     Social Determinants of Health     Financial Resource Strain: Low Risk  (12/22/2023)    Financial Resource Strain     Within the past 12 months, have you or your family members you live with been unable to get utilities (heat, electricity) when it was really needed?: No   Food Insecurity: Low Risk  (12/22/2023)    Food Insecurity     Within the past 12 months, did you worry that your food would run out before you got money to buy more?: No     Within the past 12 months, did the food you  bought just not last and you didn t have money to get more?: No   Transportation Needs: Low Risk  (12/22/2023)    Transportation Needs     Within the past 12 months, has lack of transportation kept you from medical appointments, getting your medicines, non-medical meetings or appointments, work, or from getting things that you need?: No   Interpersonal Safety: Low Risk  (12/22/2023)    Interpersonal Safety     Do you feel physically and emotionally safe where you currently live?: Yes     Within the past 12 months, have you been hit, slapped, kicked or otherwise physically hurt by someone?: No     Within the past 12 months, have you been humiliated or emotionally abused in other ways by your partner or ex-partner?: No   Housing Stability: Low Risk  (12/22/2023)    Housing Stability     Do you have housing? : Yes     Are you worried about losing your housing?: No       CURRENT MEDICATIONS:  Current Outpatient Medications   Medication Sig Dispense Refill    blood glucose (NO BRAND SPECIFIED) test strip Use to test blood sugar two times daily or as directed. To accompany: Blood Glucose Monitor Brands: per insurance. 100 strip 6    blood glucose monitoring (NO BRAND SPECIFIED) meter device kit Use to test blood sugar twice times daily or as directed. Preferred blood glucose meter OR supplies to accompany: Blood Glucose Monitor Brands: per insurance. 1 kit 0    Continuous Blood Gluc Sensor (DEXCOM G7 SENSOR) MISC Change every 10 days. 3 each 5    cyclobenzaprine (FLEXERIL) 10 MG tablet TAKE 1 TABLET(10 MG) BY MOUTH THREE TIMES DAILY AS NEEDED FOR MUSCLE SPASMS 30 tablet 1    doxepin (SINEQUAN) 10 MG capsule TAKE 1 CAPSULE(10 MG) BY MOUTH AT BEDTIME 180 capsule 2    ferrous sulfate (FEROSUL) 325 (65 Fe) MG tablet Take 1 tablet (325 mg) by mouth daily (with breakfast) 90 tablet 3    insulin degludec (TRESIBA FLEXTOUCH) 100 UNIT/ML pen Inject 40-50 Units Subcutaneous daily Inject 40 units daily.  Increase by 1 unit daily  "until fasting BG most often <150 as long as this does not lead to overnight or early morning low BG <70. 60 mL 1    Insulin Lispro (HUMALOG KWIKPEN) 200 UNIT/ML soln Inject 10-18 Units Subcutaneous 4 times daily (with meals and nightly) Give 10 units plus sliding scale to correct any premeal blood sugars >175. 60 mL 1    lactulose (CHRONULAC) 10 GM/15ML solution TAKE 30 MLS BY MOUTH 2 TIMES DAILY 473 mL 11    melatonin 10 MG TABS tablet Take 1 tablet (10 mg) by mouth nightly as needed for sleep      metFORMIN (GLUCOPHAGE XR) 500 MG 24 hr tablet Take 1 tablet (500 mg) by mouth daily (with dinner) 60 tablet 1    multivitamin w/minerals (THERA-VIT-M) tablet Take 1 tablet by mouth daily 90 tablet 1    omeprazole (PRILOSEC) 20 MG DR capsule Take 1 capsule (20 mg) by mouth 2 times daily 180 capsule 1    potassium chloride ER (KLOR-CON M) 10 MEQ CR tablet Take 1 tablet (10 mEq) by mouth 2 times daily 180 tablet 1    rifaximin (XIFAXAN) 550 MG TABS tablet Take 1 tablet (550 mg) by mouth 2 times daily for 180 days 120 tablet 2    thin (NO BRAND SPECIFIED) lancets Use with lanceting device. To accompany: Blood Glucose Monitor Brands: per insurance. 100 each 6    torsemide (DEMADEX) 10 MG tablet Take 2 tablets (20 mg) by mouth daily 180 tablet 1    zinc oxide (DESITIN) 20 % external ointment Apply topically as needed for dry skin or irritation         ROS:   10 point ROS negative except HPI    EXAM:  /69 (BP Location: Right arm, Patient Position: Sitting, Cuff Size: Adult Regular)   Pulse 99   Ht 1.73 m (5' 8.11\")   Wt 93.6 kg (206 lb 4.8 oz)   SpO2 100%   BMI 31.27 kg/m    General: appears comfortable, alert and articulate  Head: normocephalic, atraumatic  Eyes: sclera icterus, EOMI  Neck: no adenopathy  Orophyarynx: moist mucosa, no lesions, dentition intact  Heart: regular, S1/S2, 2/6 SHREYA heart at the RUSB, no gallop/rub, estimated JVP 9  Lungs: clear, no rales or wheezing  Abdomen: soft, non-tender, ascites " appreciated  Extremities: 1+ pitting edema bilaterally  Neurological: normal speech and affect, no gross motor deficits    Labs:  CBC RESULTS:  Lab Results   Component Value Date    WBC 4.9 12/19/2023    WBC 10.8 12/19/2006    RBC 2.60 (L) 12/19/2023    RBC 5.56 12/19/2006    HGB 8.8 (L) 12/19/2023    HGB 16.9 12/19/2006    HCT 25.5 (L) 12/19/2023    HCT 47.5 12/19/2006    MCV 98 12/19/2023    MCV 85 12/19/2006    MCH 33.8 (H) 12/19/2023    MCH 30.4 12/19/2006    MCHC 34.5 12/19/2023    MCHC 35.6 12/19/2006    RDW 16.7 (H) 12/19/2023    RDW 10.7 12/19/2006    PLT 68 (L) 12/19/2023     12/19/2006       CMP RESULTS:  Lab Results   Component Value Date     (L) 12/19/2023     07/05/2020    POTASSIUM 4.0 12/19/2023    POTASSIUM 3.8 03/12/2022    POTASSIUM 4.2 07/05/2020    CHLORIDE 94 (L) 12/19/2023    CHLORIDE 101 03/12/2022    CHLORIDE 102 07/05/2020    CO2 30 (H) 12/19/2023    CO2 25 03/12/2022    CO2 28 07/05/2020    ANIONGAP 7 12/19/2023    ANIONGAP 10 03/12/2022    ANIONGAP 9 07/05/2020     (H) 12/19/2023     (H) 03/12/2022     (H) 07/05/2020    BUN 11.1 12/19/2023    BUN 11 03/12/2022    BUN 13 07/05/2020    CR 0.78 12/19/2023    CR 0.95 07/05/2020    GFRESTIMATED >90 12/19/2023    GFRESTIMATED >90 07/05/2020    GFRESTBLACK >90 07/05/2020    MEG 8.5 (L) 12/19/2023    MEG 9.7 07/05/2020    BILITOTAL 9.8 (H) 12/19/2023    BILITOTAL 0.8 12/19/2006    ALBUMIN 2.4 (L) 12/19/2023    ALBUMIN 4.0 09/03/2022    ALBUMIN 5.2 (H) 12/19/2006    ALKPHOS 191 (H) 12/19/2023    ALKPHOS 117 12/19/2006    ALT 40 12/19/2023    ALT 50 12/19/2006     (H) 12/19/2023    AST 52 12/19/2006        Echocardiogram 12/23:  Global and regional left ventricular function is normal with an EF of 60-65%.  Global right ventricular function is normal.  Mild tricuspid and mitral insufficiency present.  The right ventricular systolic pressure is approximated at 37 mmHg (upper limits of normal).  Estimated  mean right atrial pressure is normal.  Trivial pericardial effusion is present.  Ascites is noted.    Assessment and Plan: Mary Lopez is a 53 year old with alcoholic cirrhosis who presents for pre-operative risk stratification.     Pre-operative risk stratification for liver transplantation: Mary is already scheduled for a CT coronary angiogram. We will also perform a hemodynamic assessment to rule out significant pulmonary vascular disease. We will order his hemodynamic study with a coronary angiogram but if the CTA coronary angiogram is not concerning, we will not proceed with coronary angiogram. We will work to arrange FFP infusion prior to the RHC as his INR is elevated from his cirrhosis.     It was a pleasure seeing your patient Mary Lopez at the [facility] Pulmonary Hypertension clinic.  Please contact us with any questions or concerns that you may have.    Sincerely,    Steven Tran MD, PhD, FAHA  Associate Professor of Medicine  Director, Chronic Thromboembolic Pulmonary Hypertension Program  Cardiovascular Division      I spent a total of 45 minutes on the date of service evaluating this patient which included face to face discussion, performing a physical exam, reviewing of the chart to gain information from other providers to obtain further history, personally reviewing echocardiograms, CT scans, pulmonary function tests, invasive pulmonary angiograms, and hemodynamic tracings, ordering tests and/or medications, and documenting clinical information in the electronic health record.                Please do not hesitate to contact me if you have any questions/concerns.     Sincerely,     Steven Tran MD

## 2023-12-28 ENCOUNTER — DOCUMENTATION ONLY (OUTPATIENT)
Dept: FAMILY MEDICINE | Facility: CLINIC | Age: 53
End: 2023-12-28
Payer: COMMERCIAL

## 2023-12-28 NOTE — PROGRESS NOTES
Mary Lopez has an upcoming lab appointment:    Future Appointments   Date Time Provider Department Center   12/29/2023 11:30 AM Liana Garcia RD [facility]   1/3/2024  4:00 PM LAB FIRST FLOOR [facility]   1/9/2024 11:00 AM LAB FIRST FLOOR [facility]   1/18/2024  8:30 AM [id number] UCCDEXA [facility]   1/18/2024  9:30 AM UC LAB UCLABR [facility]   1/19/2024 12:00 PM UUCT4 UUCT [facility]   1/30/2024 12:00 PM UU LAB GOLD WAITING UUOPLB [facility]   1/30/2024 12:30 PM U2A ROOM 5 UUSP [facility]   3/5/2024  9:00 AM Hugo Daigle MD [facility]   3/12/2024  2:00 PM Suzanne Todd, PA-C [facility]     Patient is scheduled for the following lab(s): Dr. Hoyt orders for upcoming appointment.       HENRIK NOBLES

## 2023-12-29 ENCOUNTER — VIRTUAL VISIT (OUTPATIENT)
Dept: EDUCATION SERVICES | Facility: CLINIC | Age: 53
End: 2023-12-29
Payer: COMMERCIAL

## 2023-12-29 VITALS — BODY MASS INDEX: 31.22 KG/M2 | HEIGHT: 68 IN | WEIGHT: 206 LBS

## 2023-12-29 DIAGNOSIS — E11.65 TYPE 2 DIABETES MELLITUS WITH HYPERGLYCEMIA, WITH LONG-TERM CURRENT USE OF INSULIN (H): Primary | ICD-10-CM

## 2023-12-29 DIAGNOSIS — Z79.4 TYPE 2 DIABETES MELLITUS WITH HYPERGLYCEMIA, WITH LONG-TERM CURRENT USE OF INSULIN (H): Primary | ICD-10-CM

## 2023-12-29 PROCEDURE — G0108 DIAB MANAGE TRN  PER INDIV: HCPCS | Mod: VID | Performed by: NUTRITIONIST

## 2023-12-29 ASSESSMENT — PAIN SCALES - GENERAL: PAINLEVEL: NO PAIN (0)

## 2023-12-29 NOTE — PROGRESS NOTES
Virtual Visit Details    Type of service:  Video Visit   Start 11:51am  End 12:31pm  Originating Location (pt. Location): Home    Distant Location (provider location):  Off-site  Platform used for Video Visit: Yenifer

## 2023-12-29 NOTE — NURSING NOTE
Is the patient currently in the state of [place]? YES    Visit mode:VIDEO    If the visit is dropped, the patient can be reconnected by: VIDEO VISIT: Text to cell phone:   Telephone Information:   Mobile [phone]       Will anyone else be joining the visit? NO  (If patient encounters technical issues they should call [phone]    How would you like to obtain your AVS? MyChart    Are changes needed to the allergy or medication list? No    Reason for visit: Follow-up     Shanna CHRISTY

## 2023-12-30 ENCOUNTER — ANESTHESIA EVENT (OUTPATIENT)
Dept: GASTROENTEROLOGY | Facility: CLINIC | Age: 53
End: 2023-12-30
Payer: COMMERCIAL

## 2023-12-30 ASSESSMENT — LIFESTYLE VARIABLES: TOBACCO_USE: 1

## 2023-12-30 NOTE — ANESTHESIA PREPROCEDURE EVALUATION
Anesthesia Pre-Procedure Evaluation    Patient: Mary Lopez   MRN: [id number] : 1970        Procedure : Procedure(s):  Colonoscopy          Past Medical History:   Diagnosis Date    ANGEL (acute kidney injury) (H24)     Alcoholic cirrhosis of liver without ascites (H) 2023    Alcoholic hepatitis with ascites (H28) 10/03/2023    Alcoholic hepatitis without ascites (H28) 2023    Closed fracture of one rib of left side 2023    Concussion without loss of consciousness 2020    Decompensated hepatic cirrhosis (H) 09/15/2023    Diabetes mellitus, type 2 (H) 2023    Essential hypertension 2020    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)     Mild hyperlipidemia 2021    Persistent insomnia 2023    Portal hypertension (H) 2023    Scrotal abscess     Secondary esophageal 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 (H) 2023    Tobacco abuse disorder 2020    Type 2 diabetes mellitus with hyperglycemia (H) 2023      Past Surgical History:   Procedure Laterality Date    CHOLECYSTECTOMY      TONSILLECTOMY      VASECTOMY        Allergies   Allergen Reactions    Food Anaphylaxis     baked beans    Fish Allergy     Pineapple Itching    Tilactase      Lactose intolerant       Social History     Tobacco Use    Smoking status: Former     Types: Cigarettes     Passive exposure: Never    Smokeless tobacco: Current     Types: Chew     Last attempt to quit: 2004    Tobacco comments:     Chew daily 1/3 of a tin per day   Substance Use Topics    Alcohol use: Not Currently     Alcohol/week: 12.0 standard drinks of alcohol     Types: 12 Standard drinks or equivalent per week     Comment: Sober since 2023      Wt Readings from Last 1 Encounters:   23 93.4 kg (206 lb)        Anesthesia Evaluation   Pt has had prior anesthetic. Type: MAC and General.    No history of anesthetic complications       ROS/MED HX  ENT/Pulmonary:     (+)                 tobacco use, Past use,                       Neurologic:       Cardiovascular:     (+) Dyslipidemia hypertension- -   -  - -                                 Previous cardiac testing   Echo: Date: 2023 Results:  [facility]  Diagnostic and Treatment-3rd Floor  [address] [place] [address]     Name: RENETTA LAI  MRN: [id number]  : 1970  Study Date: 2023 12:28 PM  Age: 53 yrs  Gender: Male  Patient Location: [facility]  Reason For Study: Alcoholic cirrhosis of liver with ascites (H), Portal  hypertensi  Ordering Physician: JOSTIN ABRAHAM  Referring Physician: JOSTIN ABRAHAM  Performed By: Becki Ba     BSA: 2.2 m2  Height: 69 in  Weight: 230 lb  HR: 94  BP: 118/68 mmHg  ______________________________________________________________________________  Procedure  Echocardiogram with two-dimensional, color and spectral Doppler performed.  ______________________________________________________________________________  Interpretation Summary  Global and regional left ventricular function is normal with an EF of 60-65%.  Global right ventricular function is normal.  Mild tricuspid and mitral insufficiency present.  The right ventricular systolic pressure is approximated at 37 mmHg (upper  limits of normal).  Estimated mean right atrial pressure is normal.  Trivial pericardial effusion is present.  Ascites is noted.  ______________________________________________________________________________  Left Ventricle  Left ventricular size is normal. Left ventricular wall thickness is normal.  Global and regional left ventricular function is normal with an EF of 60-65%.  Left ventricular diastolic function is indeterminate. No regional wall motion  abnormalities are seen.     Right Ventricle  The right ventricle is normal size. Global right ventricular function is  normal.     Atria  The right atria appears normal. Mild left  atrial enlargement is present. The  atrial septum is intact as assessed by color Doppler .     Mitral Valve  Mild mitral insufficiency is present.     Aortic Valve  Aortic valve is normal in structure and function.     Tricuspid Valve  Mild tricuspid insufficiency is present. The right ventricular systolic  pressure is approximated at 33.6 mmHg plus the right atrial pressure.     Pulmonic Valve  The pulmonic valve is normal.     Vessels  The aorta root is normal. The inferior vena cava is normal. Estimated mean  right atrial pressure is normal.     Pericardium  Trivial pericardial effusion is present.     Miscellaneous  Ascites is noted.     Compared to Previous Study  Previous study not available for comparison.  ______________________________________________________________________________  MMode/2D Measurements & Calculations  IVSd: 0.93 cm  LVIDd: 5.8 cm  LVIDs: 3.9 cm  LVPWd: 0.84 cm  FS: 32.6 %  LV mass(C)d: 199.3 grams  LV mass(C)dI: 90.9 grams/m2  Ao root diam: 3.7 cm  LA dimension: 4.0 cm  asc Aorta Diam: 3.5 cm  LA/Ao: 1.1  LVOT diam: 2.4 cm  LVOT area: 4.7 cm2  Ao root diam index Ht(cm/m): 2.1  Ao root diam index BSA (cm/m2): 1.7  Asc Ao diam index BSA (cm/m2): 1.6  Asc Ao diam index Ht(cm/m): 2.0  LA Volume (BP): 86.2 ml     LA Volume Index (BP): 39.4 ml/m2  RV Base: 4.5 cm  RWT: 0.29  TAPSE: 2.5 cm     Doppler Measurements & Calculations  MV E max kristopher: 92.2 cm/sec  MV A max kristopher: 81.9 cm/sec  MV E/A: 1.1  MV max P.9 mmHg  MV mean P.3 mmHg  MV V2 VTI: 25.2 cm  MVA(VTI): 5.1 cm2  MV dec time: 0.16 sec  Ao V2 max: 173.7 cm/sec  Ao max P.1 mmHg  Ao V2 mean: 121.0 cm/sec  Ao mean P.7 mmHg  Ao V2 VTI: 35.2 cm  DRAKE(I,D): 3.6 cm2  DRAKE(V,D): 4.4 cm2  LV V1 max PG: 10.3 mmHg  LV V1 max: 160.6 cm/sec  LV V1 VTI: 27.0 cm  SV(LVOT): 127.4 ml  SI(LVOT): 58.1 ml/m2     PA V2 max: 109.7 cm/sec  PA max P.9 mmHg  PA acc time: 0.09 sec  TR max kristopher: 289.9 cm/sec  TR max P.6 mmHg  AV Kristopher Ratio (DI):  0.92  DRAKE Index (cm2/m2): 1.7  E/E' av.4  Lateral E/e': 7.1  Medial E/e': 7.7  RV S Kristopher: 22.3 cm/sec     ______________________________________________________________________________  Report approved by: Rosendo Thorpe 2023 01:11 PM    Stress Test:  Date: Results:    ECG Reviewed:  Date: Results:    Cath:  Date: Results:      METS/Exercise Tolerance:     Hematologic: Comments: Thrombocytopenia    (+)      anemia,          Musculoskeletal:       GI/Hepatic: Comment: Decompensated hepatic cirrhosis  Gastric varices  Portal hypertensive gastropathy  Secondary esophageal 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  Alcoholic hepatitis with ascites  Severe malnutrition    (+)   esophageal disease, Varices,       hepatitis type Alcoholic, liver disease,       Renal/Genitourinary: Comment: acute kidney injury hx    (+) renal disease, type: ARF,            Endo: Comment: BMI: 31.22    (+)  type II DM, Last HgA1c: 7.7, date: 2023, Using insulin,          Obesity,       Psychiatric/Substance Use: Comment: Alcohol use disorder, severe, in early remission        (+)   alcohol abuse      Infectious Disease:       Malignancy:       Other:            Physical Exam    Airway  airway exam normal      Mallampati: II   TM distance: > 3 FB   Neck ROM: full   Mouth opening: > 3 cm    Respiratory Devices and Support         Dental  no notable dental history         Cardiovascular   cardiovascular exam normal       Rhythm and rate: regular and normal     Pulmonary   pulmonary exam normal        breath sounds clear to auscultation           OUTSIDE LABS:  CBC:   Lab Results   Component Value Date    WBC 6.8 2023    WBC 4.9 2023    HGB 9.3 (L) 2023    HGB 8.8 (L) 2023    HCT 26.4 (L) 2023    HCT 25.5 (L) 2023    PLT 69 (L) 2023    PLT 68 (L) 2023     BMP:   Lab Results   Component Value Date     2023     (L) 2023    POTASSIUM 3.6 2023    POTASSIUM 4.0 2023  "   CHLORIDE 99 12/26/2023    CHLORIDE 94 (L) 12/19/2023    CO2 29 12/26/2023    CO2 30 (H) 12/19/2023    BUN 7.0 12/26/2023    BUN 11.1 12/19/2023    CR 0.65 (L) 12/26/2023    CR 0.78 12/19/2023     (H) 12/26/2023     (H) 12/19/2023     COAGS:   Lab Results   Component Value Date    INR 2.97 (H) 12/19/2023     POC: No results found for: \"BGM\", \"HCG\", \"HCGS\"  HEPATIC:   Lab Results   Component Value Date    ALBUMIN 2.4 (L) 12/19/2023    PROTTOTAL 6.7 12/19/2023    ALT 40 12/19/2023     (H) 12/19/2023    ALKPHOS 191 (H) 12/19/2023    BILITOTAL 9.8 (H) 12/19/2023    JAQUELINE 57 10/31/2023     OTHER:   Lab Results   Component Value Date    A1C 7.7 (H) 12/19/2023    MEG 8.9 12/26/2023    PHOS 2.9 12/19/2023    MAG 1.9 11/29/2005    LIPASE 131 11/29/2005    AMYLASE 35 11/29/2005    TSH 2.18 03/12/2022       Anesthesia Plan    ASA Status:  3    NPO Status:  NPO Appropriate    Anesthesia Type: MAC.     - Reason for MAC: straight local not clinically adequate   Induction: Intravenous, Propofol.   Maintenance: TIVA.        Consents    Anesthesia Plan(s) and associated risks, benefits, and realistic alternatives discussed. Questions answered and patient/representative(s) expressed understanding.     - Discussed:     - Discussed with:  Patient      - Extended Intubation/Ventilatory Support Discussed: No.      - Patient is DNR/DNI Status: No     Use of blood products discussed: No .     Postoperative Care    Pain management: IV analgesics.        Comments:    Other Comments: The risks and benefits of anesthesia, and the alternatives where applicable, have been discussed with the patient, and they wish to proceed.           LESA Milton CRNA    I have reviewed the pertinent notes and labs in the chart from the past 30 days and (re)examined the patient.  Any updates or changes from those notes are reflected in this note.     # Hyponatremia: Lowest Na = 130 mmol/L in last 30 days, will monitor as appropriate  " "    # Hypoalbuminemia: Lowest albumin = 2.4 g/dL in the past 30 days , will monitor as appropriate   # Coagulation Defect: INR = 2.97 (Ref range: 0.85 - 1.15) and/or PTT = N/A, will monitor for bleeding  # Thrombocytopenia: Lowest platelets = 68 in last 30 days, will monitor for bleeding  # DMII: A1C = 7.7 % (Ref range: <5.7 %) within past 6 months  # Obesity: Estimated body mass index is 31.22 kg/m  as calculated from the following:    Height as of 12/29/23: 1.73 m (5' 8.11\").    Weight as of 12/29/23: 93.4 kg (206 lb).      "

## 2024-01-01 NOTE — PROGRESS NOTES
Diabetes Self-Management Education & Support    Mary Luisrowan Lopez presents today for education related to Type 2 diabetes    Patient is being treated with:  insulin  He is accompanied by spouse    Year of diagnosis: 2023  Referring provider:  Lai  Living Situation: with spouse   Employment: n/a    PATIENT CONCERNS RELATED TO DIABETES SELF MANAGEMENT:       ASSESSMENT:    Taking Medication:     Current Diabetes Management per Patient:  Taking diabetes medications? yes:     Diabetes Medication(s)       Biguanides       metFORMIN (GLUCOPHAGE XR) 500 MG 24 hr tablet Take 1 tablet (500 mg) by mouth daily (with dinner)       Insulin       insulin degludec (TRESIBA FLEXTOUCH) 100 UNIT/ML pen Inject 40-50 Units Subcutaneous daily Inject 40 units daily.  Increase by 1 unit daily until fasting BG most often <150 as long as this does not lead to overnight or early morning low BG <70.     Insulin Lispro (HUMALOG KWIKPEN) 200 UNIT/ML soln Inject 10-18 Units Subcutaneous 4 times daily (with meals and nightly) Give 10 units plus sliding scale to correct any premeal blood sugars >175.          Taking 46 units of Tresiba now at night.   Humalog 12 dose plus correction scale 1/35/175/210    Monitoring                  Patient's most recent   Lab Results   Component Value Date    A1C 8.1 10/31/2023        Healthy Eating:   cereal (sugary) for breakfast  Leftovers or cereal for lunch  Goulash yesterday for dinner  Or fruit cup/greek yogurt/cookie    Diet is pretty high carb  Large portions of cereal     Problem Solving:      Patient is at risk of hypoglycemia?: YES  Hospitalizations for hyper or hypoglycemia: No      EDUCATION and INSTRUCTION PROVIDED AT THIS VISIT:    We talked more about diet today.  - breakfast is high carb  - patient is drinking 1/2 gallon of milk daily  - he is waking in the middle of the night (including on nights when not low) and is eating a big bowl of cereal and drinking milk, resulting in elevated  glucose    We discussed reducing intake of milk and possibly switching to unsweetened almond milk but patient did not like that idea.  Discussed having oats or cream of wheat instead for breakfast, toast with eggs, or an english muffin with egg. Patient likes the cream of wheat idea and states could try having that when waked at night as well versus the higher carb large cereal portion. May need insulin coverage for the overnight meal.    I am hesitant to increase the tresiba today given he is still sometimes having nocturnal hypoglycemia.    Insulin is sometimes given after patient starts eating contributing to post prandial highs.     Patient-stated goal written and given to Mary Lopez.  Verbalized and demonstrated understanding of instructions.     PLAN:  Increase humalog to 13 units with meals  Switch to a serving of cream of wheat at breakfast  Work on reducing milk  Have cream of wheat at night instead of large bowl of cereal and milk    Sent to Suzanne THOMPSON for additional recommendations     FOLLOW-UP:    Via video in two weeks    Time spent with patient at today's visit was 40 minutes.      Any diabetes medication dose changes were made via the CDE Protocol and Collaborative Practice Agreement with [facility] and  [facility].  A copy of this encounter was provided to patient's referring provider.

## 2024-01-02 ENCOUNTER — LAB (OUTPATIENT)
Dept: LAB | Facility: CLINIC | Age: 54
End: 2024-01-02
Payer: COMMERCIAL

## 2024-01-02 ENCOUNTER — ANESTHESIA (OUTPATIENT)
Dept: GASTROENTEROLOGY | Facility: CLINIC | Age: 54
End: 2024-01-02
Payer: COMMERCIAL

## 2024-01-02 ENCOUNTER — HOSPITAL ENCOUNTER (OUTPATIENT)
Facility: CLINIC | Age: 54
Discharge: HOME | End: 2024-01-02
Attending: INTERNAL MEDICINE | Admitting: INTERNAL MEDICINE
Payer: COMMERCIAL

## 2024-01-02 VITALS
SYSTOLIC BLOOD PRESSURE: 118 MMHG | TEMPERATURE: 99.6 F | RESPIRATION RATE: 20 BRPM | DIASTOLIC BLOOD PRESSURE: 67 MMHG | HEART RATE: 89 BPM | OXYGEN SATURATION: 92 %

## 2024-01-02 DIAGNOSIS — R54 FRAILTY: ICD-10-CM

## 2024-01-02 DIAGNOSIS — K70.11 ALCOHOLIC HEPATITIS WITH ASCITES (H): ICD-10-CM

## 2024-01-02 DIAGNOSIS — K70.31 ALCOHOLIC CIRRHOSIS OF LIVER WITH ASCITES (H): ICD-10-CM

## 2024-01-02 DIAGNOSIS — E87.1 HYPONATREMIA: ICD-10-CM

## 2024-01-02 DIAGNOSIS — Z12.11 SCREENING FOR COLON CANCER: Primary | ICD-10-CM

## 2024-01-02 DIAGNOSIS — D64.9 ANEMIA, UNSPECIFIED TYPE: ICD-10-CM

## 2024-01-02 DIAGNOSIS — D64.9 ANEMIA DUE TO UNKNOWN MECHANISM: ICD-10-CM

## 2024-01-02 LAB
ALBUMIN SERPL BCG-MCNC: 2.5 G/DL (ref 3.5–5.2)
ANION GAP SERPL CALCULATED.3IONS-SCNC: 9 MMOL/L (ref 7–15)
BASOPHILS # BLD AUTO: 0.1 10E3/UL (ref 0–0.2)
BASOPHILS NFR BLD AUTO: 1 %
BILIRUB SERPL-MCNC: 12.2 MG/DL
BUN SERPL-MCNC: 11 MG/DL (ref 6–20)
CALCIUM SERPL-MCNC: 9 MG/DL (ref 8.6–10)
CHLORIDE SERPL-SCNC: 92 MMOL/L (ref 98–107)
COLONOSCOPY: NORMAL
CREAT SERPL-MCNC: 0.68 MG/DL (ref 0.67–1.17)
CREAT SERPL-MCNC: 0.71 MG/DL (ref 0.67–1.17)
DEPRECATED HCO3 PLAS-SCNC: 32 MMOL/L (ref 22–29)
EGFRCR SERPLBLD CKD-EPI 2021: >90 ML/MIN/1.73M2
EGFRCR SERPLBLD CKD-EPI 2021: >90 ML/MIN/1.73M2
EOSINOPHIL # BLD AUTO: 0.1 10E3/UL (ref 0–0.7)
EOSINOPHIL NFR BLD AUTO: 2 %
ERYTHROCYTE [DISTWIDTH] IN BLOOD BY AUTOMATED COUNT: 16 % (ref 10–15)
GLUCOSE BLDC GLUCOMTR-MCNC: 145 MG/DL (ref 70–99)
GLUCOSE SERPL-MCNC: 170 MG/DL (ref 70–99)
HCT VFR BLD AUTO: 25.5 % (ref 40–53)
HGB BLD-MCNC: 8.6 G/DL (ref 13.3–17.7)
IMM GRANULOCYTES # BLD: 0.1 10E3/UL
IMM GRANULOCYTES NFR BLD: 1 %
INR PPP: 2.49 (ref 0.85–1.15)
LYMPHOCYTES # BLD AUTO: 0.9 10E3/UL (ref 0.8–5.3)
LYMPHOCYTES NFR BLD AUTO: 15 %
MCH RBC QN AUTO: 34.4 PG (ref 26.5–33)
MCHC RBC AUTO-ENTMCNC: 33.7 G/DL (ref 31.5–36.5)
MCV RBC AUTO: 102 FL (ref 78–100)
MONOCYTES # BLD AUTO: 0.6 10E3/UL (ref 0–1.3)
MONOCYTES NFR BLD AUTO: 9 %
NEUTROPHILS # BLD AUTO: 4.6 10E3/UL (ref 1.6–8.3)
NEUTROPHILS NFR BLD AUTO: 72 %
NRBC # BLD AUTO: 0 10E3/UL
NRBC BLD AUTO-RTO: 0 /100
PLATELET # BLD AUTO: 91 10E3/UL (ref 150–450)
POTASSIUM SERPL-SCNC: 3.4 MMOL/L (ref 3.4–5.3)
RBC # BLD AUTO: 2.5 10E6/UL (ref 4.4–5.9)
SODIUM SERPL-SCNC: 133 MMOL/L (ref 135–145)
SODIUM SERPL-SCNC: 135 MMOL/L (ref 135–145)
WBC # BLD AUTO: 6.3 10E3/UL (ref 4–11)

## 2024-01-02 PROCEDURE — 82040 ASSAY OF SERUM ALBUMIN: CPT

## 2024-01-02 PROCEDURE — 88305 TISSUE EXAM BY PATHOLOGIST: CPT | Mod: 26 | Performed by: PATHOLOGY

## 2024-01-02 PROCEDURE — 370N000017 HC ANESTHESIA TECHNICAL FEE, PER MIN: Performed by: INTERNAL MEDICINE

## 2024-01-02 PROCEDURE — 80048 BASIC METABOLIC PNL TOTAL CA: CPT

## 2024-01-02 PROCEDURE — 82247 BILIRUBIN TOTAL: CPT

## 2024-01-02 PROCEDURE — 85610 PROTHROMBIN TIME: CPT

## 2024-01-02 PROCEDURE — 84295 ASSAY OF SERUM SODIUM: CPT

## 2024-01-02 PROCEDURE — 99000 SPECIMEN HANDLING OFFICE-LAB: CPT

## 2024-01-02 PROCEDURE — 258N000003 HC RX IP 258 OP 636: Performed by: NURSE ANESTHETIST, CERTIFIED REGISTERED

## 2024-01-02 PROCEDURE — G0481 DRUG TEST DEF 8-14 CLASSES: HCPCS | Mod: 90

## 2024-01-02 PROCEDURE — 85025 COMPLETE CBC W/AUTO DIFF WBC: CPT | Performed by: INTERNAL MEDICINE

## 2024-01-02 PROCEDURE — 82962 GLUCOSE BLOOD TEST: CPT

## 2024-01-02 PROCEDURE — 36415 COLL VENOUS BLD VENIPUNCTURE: CPT

## 2024-01-02 PROCEDURE — 88305 TISSUE EXAM BY PATHOLOGIST: CPT | Mod: TC | Performed by: INTERNAL MEDICINE

## 2024-01-02 PROCEDURE — 250N000009 HC RX 250: Performed by: NURSE ANESTHETIST, CERTIFIED REGISTERED

## 2024-01-02 PROCEDURE — 250N000011 HC RX IP 250 OP 636: Performed by: NURSE ANESTHETIST, CERTIFIED REGISTERED

## 2024-01-02 PROCEDURE — 45380 COLONOSCOPY AND BIOPSY: CPT | Mod: PT | Performed by: INTERNAL MEDICINE

## 2024-01-02 RX ORDER — LIDOCAINE 40 MG/G
CREAM TOPICAL
Status: DISCONTINUED | OUTPATIENT
Start: 2024-01-02 | End: 2024-01-02 | Stop reason: HOSPADM

## 2024-01-02 RX ORDER — LIDOCAINE HYDROCHLORIDE 20 MG/ML
INJECTION, SOLUTION INFILTRATION; PERINEURAL PRN
Status: DISCONTINUED | OUTPATIENT
Start: 2024-01-02 | End: 2024-01-02

## 2024-01-02 RX ORDER — SODIUM CHLORIDE, SODIUM LACTATE, POTASSIUM CHLORIDE, CALCIUM CHLORIDE 600; 310; 30; 20 MG/100ML; MG/100ML; MG/100ML; MG/100ML
INJECTION, SOLUTION INTRAVENOUS CONTINUOUS
Status: DISCONTINUED | OUTPATIENT
Start: 2024-01-02 | End: 2024-01-02 | Stop reason: HOSPADM

## 2024-01-02 RX ORDER — ONDANSETRON 4 MG/1
4 TABLET, ORALLY DISINTEGRATING ORAL EVERY 30 MIN PRN
Status: CANCELLED | OUTPATIENT
Start: 2024-01-02

## 2024-01-02 RX ORDER — ONDANSETRON 2 MG/ML
4 INJECTION INTRAMUSCULAR; INTRAVENOUS EVERY 30 MIN PRN
Status: CANCELLED | OUTPATIENT
Start: 2024-01-02

## 2024-01-02 RX ORDER — FENTANYL CITRATE 50 UG/ML
25 INJECTION, SOLUTION INTRAMUSCULAR; INTRAVENOUS
Status: CANCELLED | OUTPATIENT
Start: 2024-01-02

## 2024-01-02 RX ORDER — SODIUM CHLORIDE, SODIUM LACTATE, POTASSIUM CHLORIDE, CALCIUM CHLORIDE 600; 310; 30; 20 MG/100ML; MG/100ML; MG/100ML; MG/100ML
INJECTION, SOLUTION INTRAVENOUS CONTINUOUS PRN
Status: DISCONTINUED | OUTPATIENT
Start: 2024-01-02 | End: 2024-01-02

## 2024-01-02 RX ORDER — PROPOFOL 10 MG/ML
INJECTION, EMULSION INTRAVENOUS CONTINUOUS PRN
Status: DISCONTINUED | OUTPATIENT
Start: 2024-01-02 | End: 2024-01-02

## 2024-01-02 RX ORDER — PROPOFOL 10 MG/ML
INJECTION, EMULSION INTRAVENOUS PRN
Status: DISCONTINUED | OUTPATIENT
Start: 2024-01-02 | End: 2024-01-02

## 2024-01-02 RX ADMIN — LIDOCAINE HYDROCHLORIDE 50 MG: 20 INJECTION, SOLUTION INFILTRATION; PERINEURAL at 13:05

## 2024-01-02 RX ADMIN — PROPOFOL 200 MCG/KG/MIN: 10 INJECTION, EMULSION INTRAVENOUS at 13:05

## 2024-01-02 RX ADMIN — SODIUM CHLORIDE, POTASSIUM CHLORIDE, SODIUM LACTATE AND CALCIUM CHLORIDE: 600; 310; 30; 20 INJECTION, SOLUTION INTRAVENOUS at 12:46

## 2024-01-02 RX ADMIN — SODIUM CHLORIDE, POTASSIUM CHLORIDE, SODIUM LACTATE AND CALCIUM CHLORIDE: 600; 310; 30; 20 INJECTION, SOLUTION INTRAVENOUS at 12:58

## 2024-01-02 RX ADMIN — PROPOFOL 100 MG: 10 INJECTION, EMULSION INTRAVENOUS at 13:05

## 2024-01-02 ASSESSMENT — ACTIVITIES OF DAILY LIVING (ADL): ADLS_ACUITY_SCORE: 35

## 2024-01-02 NOTE — LETTER
January 8, 2024      Mary Lopez  [address] [place] [address]        Dear ,    We are writing to inform you of your test results.    Your test results fall within the expected range(s) or remain unchanged from previous results.  Please continue with current treatment plan.    Repeat exam in 10 yrs is suggested .    Resulted Orders   Surgical Pathology Exam   Result Value Ref Range    Case Report       Surgical Pathology Report                         Case: [id number]                                  Authorizing Provider:  Jak Urbina MD        Collected:           01/02/2024 01:11 PM          Ordering Location:     [facility]          Received:            01/02/2024 01:30 PM                                 [facility]                                                          Pathologist:           David Diaz MD                                                                           Specimen:    Large Intestine, Colon, Transverse, Transverse colon polyp                                 Final Diagnosis       Large intestine, transverse, polyp, biopsy/polypectomy:  - Colonic mucosa without sufficient histologic features of polyp, mass, dysplasia, or malignancy.  (Additional histologic levels were obtained.)  - Size of targeted polyp: 3 mm in greatest dimension (per operative note).  - Resection and retrieval of targeted polyp: complete (per operative note).  - COMMENT: This biopsy may reflect early hyperplastic changes, a prominent mucosal fold, or an unsampled lesion.       Clinical Information       Clinical information pertinent to this case is available in the electronic medical record and/or specimen requisition and was reviewed by the signing pathologist.       Gross Description       A(1). Large Intestine, Colon, Transverse, Transverse colon polyp:  The specimen is  received in formalin, labeled with the patient's name, medical record number and other identifying information and designated  transverse colon polyp . It consists of a single tan soft tissue fragment measuring 0.3 cm. Entirely submitted in one cassette.   (JAY Enciso (ASCP)      Microscopic Description       A microscopic examination is performed.  Deeper histologic levels (on A1) are obtained for additional visualization.       Performing Labs       The technical component of this testing was completed at [facility]      Case Images         If you have any questions or concerns, please call the clinic at the number listed above.       Sincerely,      Jak Urbina MD

## 2024-01-02 NOTE — ANESTHESIA POSTPROCEDURE EVALUATION
Patient: Mary Lopez    Procedure: Procedure(s):  COLONOSCOPY, WITH POLYPECTOMY       Anesthesia Type:  MAC    Note:  Disposition: Outpatient   Postop Pain Control: Uneventful            Sign Out: Well controlled pain   PONV: No   Neuro/Psych: Uneventful            Sign Out: Acceptable/Baseline neuro status   Airway/Respiratory: Uneventful            Sign Out: Acceptable/Baseline resp. status   CV/Hemodynamics: Uneventful            Sign Out: Acceptable CV status   Other NRE: NONE   DID A NON-ROUTINE EVENT OCCUR? No    Event details/Postop Comments:  Pt was happy with anesthesia care.  No complications.  I will follow up with the pt if needed.           Last vitals:  Vitals Value Taken Time   /63 01/02/24 1330   Temp     Pulse 89 01/02/24 1330   Resp     SpO2 78 % 01/02/24 1342   Vitals shown include unfiled device data.    Electronically Signed By: LESA Milton CRNA  January 2, 2024  1:42 PM

## 2024-01-02 NOTE — H&P
[facility] Anesthesia Pre-op History and Physical    Mary Lopez MRN# [id number]   Age: 53 year old YOB: 1970      Date of Surgery: 1/2/2024 Location [facility]      Date of Exam 1/2/2024 Facility (In hospital)       Home clinic: [facility]  Primary care provider: Jimmie Hoyt         Chief Complaint and/or Reason for Procedure:   No chief complaint on file.  Colonoscopy         Active problem list:     Patient Active Problem List    Diagnosis Date Noted    Type 2 diabetes mellitus with hyperglycemia (H) 12/22/2023     Priority: Medium    Diabetes mellitus, type 2 (H) 11/22/2023     Priority: Medium    Alcoholic hepatitis with ascites (H28) 10/03/2023     Priority: Medium    Hyponatremia 09/15/2023     Priority: Medium    Decompensated hepatic cirrhosis (H) 09/15/2023     Priority: Medium    Severe malnutrition (H24) 09/06/2023     Priority: Medium    Bilateral leg edema 07/25/2023     Priority: Medium    Portal hypertensive gastropathy (H) 07/13/2023     Priority: Medium    Splenomegaly 07/13/2023     Priority: Medium    Secondary esophageal 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 (H) 07/13/2023     Priority: Medium    Anemia due to unknown mechanism 07/13/2023     Priority: Medium    Thrombocytopenia (H24) 07/13/2023     Priority: Medium    Persistent insomnia 07/13/2023     Priority: Medium    Gastric varices 06/27/2023     Priority: Medium    Morbid obesity (H) 09/16/2022     Priority: Medium    IFG (impaired fasting glucose) 12/07/2021     Priority: Medium    Mild hyperlipidemia 12/07/2021     Priority: Medium    Primary hypertension 03/11/2020     Priority: Medium    Tobacco abuse disorder 03/11/2020     Priority: Medium    Alcohol use disorder, severe, in early remission (H) 03/26/2014     Priority: Medium     Sober since 6/2023              Medications (include herbals and vitamins):   Any Plavix use in the last 7 days? No     No  current facility-administered medications for this encounter.     Current Outpatient Medications   Medication Sig    bisacodyl (DULCOLAX) 5 MG EC tablet Take 2 tablets at 3 pm the day before your procedure. If your procedure is before 11 am, take 2 additional tablets at 11 pm. If your procedure is after 11 am, take 2 additional tablets at 6 am. For additional instructions refer to your colonoscopy prep instructions.    multivitamin, therapeutic (THERA-VIT) TABS tablet Take 1 tablet by mouth daily    polyethylene glycol (GOLYTELY) 236 g suspension The night before the exam at 6 pm drink an 8-ounce glass every 15 minutes until the jug is half empty. If you arrive before 11 AM: Drink the other half of the Golytely jug at 11 PM night before procedure. If you arrive after 11 AM: Drink the other half of the Golytely jug at 6 AM day of procedure. For additional instructions refer to your colonoscopy prep instructions.    blood glucose (NO BRAND SPECIFIED) test strip Use to test blood sugar two times daily or as directed. To accompany: Blood Glucose Monitor Brands: per insurance.    blood glucose monitoring (NO BRAND SPECIFIED) meter device kit Use to test blood sugar twice times daily or as directed. Preferred blood glucose meter OR supplies to accompany: Blood Glucose Monitor Brands: per insurance.    Continuous Blood Gluc Sensor (DEXCOM G7 SENSOR) MISC Change every 10 days.    cyclobenzaprine (FLEXERIL) 10 MG tablet TAKE 1 TABLET(10 MG) BY MOUTH THREE TIMES DAILY AS NEEDED FOR MUSCLE SPASMS    doxepin (SINEQUAN) 10 MG capsule TAKE 1 CAPSULE(10 MG) BY MOUTH AT BEDTIME    insulin degludec (TRESIBA FLEXTOUCH) 100 UNIT/ML pen Inject 40-50 Units Subcutaneous daily Inject 40 units daily.  Increase by 1 unit daily until fasting BG most often <150 as long as this does not lead to overnight or early morning low BG <70.    Insulin Lispro (HUMALOG KWIKPEN) 200 UNIT/ML soln Inject 10-18 Units Subcutaneous 4 times daily (with meals  and nightly) Give 10 units plus sliding scale to correct any premeal blood sugars >175.    lactulose (CHRONULAC) 10 GM/15ML solution TAKE 30 MLS BY MOUTH 2 TIMES DAILY    melatonin 10 MG TABS tablet Take 1 tablet (10 mg) by mouth nightly as needed for sleep    metFORMIN (GLUCOPHAGE XR) 500 MG 24 hr tablet Take 1 tablet (500 mg) by mouth daily (with dinner)    multivitamin w/minerals (THERA-VIT-M) tablet Take 1 tablet by mouth daily    omeprazole (PRILOSEC) 20 MG DR capsule Take 1 capsule (20 mg) by mouth 2 times daily    potassium chloride ER (KLOR-CON M) 10 MEQ CR tablet Take 1 tablet (10 mEq) by mouth 2 times daily    rifaximin (XIFAXAN) 550 MG TABS tablet Take 1 tablet (550 mg) by mouth 2 times daily for 180 days    thin (NO BRAND SPECIFIED) lancets Use with lanceting device. To accompany: Blood Glucose Monitor Brands: per insurance.    torsemide (DEMADEX) 10 MG tablet Take 2 tablets (20 mg) by mouth daily    zinc oxide (DESITIN) 20 % external ointment Apply topically as needed for dry skin or irritation             Allergies:      Allergies   Allergen Reactions    Food Anaphylaxis     baked beans    Fish Allergy     Pineapple Itching    Tilactase      Lactose intolerant      Allergy to Latex? No  Allergy to tape?   No  Intolerances:             Physical Exam:   All vitals have been reviewed  No data found.  No intake/output data recorded.  Lungs:   No increased work of breathing, good air exchange, clear to auscultation bilaterally, no crackles or wheezing     Cardiovascular:   Normal apical impulse, regular rate and rhythm, normal S1 and S2, no S3 or S4, and no murmur noted             Lab / Radiology Results:            Anesthetic risk and/or ASA classification:       Jak Urbina MD

## 2024-01-02 NOTE — ANESTHESIA CARE TRANSFER NOTE
Patient: Mary Lopez    Procedure: Procedure(s):  COLONOSCOPY, WITH POLYPECTOMY       Diagnosis: Alcoholic cirrhosis of liver with ascites (H) [K70.31]  Diagnosis Additional Information: No value filed.    Anesthesia Type:   MAC     Note:    Oropharynx: oropharynx clear of all foreign objects and spontaneously breathing  Level of Consciousness: awake  Oxygen Supplementation: room air    Independent Airway: airway patency satisfactory and stable  Dentition: dentition unchanged  Vital Signs Stable: post-procedure vital signs reviewed and stable  Report to RN Given: handoff report given  Patient transferred to: Phase II    Handoff Report: Identifed the Patient, Identified the Reponsible Provider, Reviewed the pertinent medical history, Discussed the surgical course, Reviewed Intra-OP anesthesia mangement and issues during anesthesia, Set expectations for post-procedure period and Allowed opportunity for questions and acknowledgement of understanding      Vitals:  Vitals Value Taken Time   /63 01/02/24 1330   Temp     Pulse 89 01/02/24 1330   Resp     SpO2 95 % 01/02/24 1341   Vitals shown include unfiled device data.    Electronically Signed By: LESA Milton [facility]  January 2, 2024  1:41 PM

## 2024-01-02 NOTE — DISCHARGE INSTRUCTIONS
[facility]    Home Care Following Endoscopy          Activity:  You have just undergone an endoscopic procedure usually performed with conscious sedation.  Do not work or operate machinery (including a car) for at least 12 hours.    I encourage you to walk and attempt to pass this air as soon as possible.    Diet:  Return to the diet you were on before your procedure but eat lightly for the first 12-24 hours.  Drink plenty of water.  Resume any regular medications unless otherwise advised by your physician.  Please begin any new medication prescribed as a result of your procedure as directed by your physician.   If you had any biopsy or polyp removed please refrain from aspirin or aspirin products for 2 days.  If on Coumadin please restart as instructed by your physician.   Pain:  You may take Tylenol as needed for pain.  Expected Recovery:  You can expect some mild abdominal fullness and/or discomfort due to the air used to inflate your intestinal tract.     Call Your Physician if You Have:    After Colonoscopy:  Worsening persisting abdominal pain which is worse with activity.  Fevers (>101 degrees F), chills or shakes.  Passage of continued blood with bowel movements.     Any questions or concerns about your recovery, please call [phone] or after hours [phone] ([phone]) Nurse Advice Line.    Follow-up Care:  You did have polyps/biopsy tissue sample(s) removed.  The polyps/biopsy tissue sample(s) will be sent to pathology.    You should receive letter in your My Chart from Dr. Urbina with your results within 1-2 weeks. If you do not participate in My Chart a physical letter will come in the mail in 2-3 weeks.  Please call if you have not received a notification of your results.  If asked to return to clinic please make an appointment 1 week after your procedure.  Call [phone].

## 2024-01-02 NOTE — TELEPHONE ENCOUNTER
Routing to provider to review and advise if provider can place order for BMP as blood was already drawn from patient.    Francisco Javier Miguel RN  [facility]

## 2024-01-03 ENCOUNTER — MYC MEDICAL ADVICE (OUTPATIENT)
Dept: FAMILY MEDICINE | Facility: CLINIC | Age: 54
End: 2024-01-03

## 2024-01-03 NOTE — TELEPHONE ENCOUNTER
Spoke with Adina to review blood glucose readings.     Friday: 466, 491 1.5 hours after eating  Saturday: 381 fasting, 527 after eating  Sunday 287 fasting, 507 one hour after eating  Monday 239 after drinking some boost    Taking Lantus 20 units daily    Weight 204 lbs  Height 5 10    Recommend to increase lantus to 25 units.   Note routed to Dr. Hoyt for review.    Patient will  graham this week.  Follow up Friday.      
no fever/no chills

## 2024-01-04 LAB
PATH REPORT.COMMENTS IMP SPEC: NORMAL
PATH REPORT.COMMENTS IMP SPEC: NORMAL
PATH REPORT.FINAL DX SPEC: NORMAL
PATH REPORT.GROSS SPEC: NORMAL
PATH REPORT.MICROSCOPIC SPEC OTHER STN: NORMAL
PATH REPORT.RELEVANT HX SPEC: NORMAL
PHOTO IMAGE: NORMAL

## 2024-01-05 ENCOUNTER — DOCUMENTATION ONLY (OUTPATIENT)
Dept: OTHER | Facility: CLINIC | Age: 54
End: 2024-01-05
Payer: COMMERCIAL

## 2024-01-05 DIAGNOSIS — E11.00 TYPE 2 DIABETES MELLITUS WITH HYPEROSMOLARITY WITHOUT COMA, WITH LONG-TERM CURRENT USE OF INSULIN (H): Primary | ICD-10-CM

## 2024-01-05 DIAGNOSIS — Z79.4 TYPE 2 DIABETES MELLITUS WITH HYPEROSMOLARITY WITHOUT COMA, WITH LONG-TERM CURRENT USE OF INSULIN (H): Primary | ICD-10-CM

## 2024-01-06 ENCOUNTER — DOCUMENTATION ONLY (OUTPATIENT)
Dept: OTHER | Facility: CLINIC | Age: 54
End: 2024-01-06
Payer: COMMERCIAL

## 2024-01-08 RX ORDER — PEN NEEDLE, DIABETIC 32GX 5/32"
NEEDLE, DISPOSABLE MISCELLANEOUS
Qty: 100 EACH | Refills: 11 | Status: SHIPPED | OUTPATIENT
Start: 2024-01-08

## 2024-01-09 ENCOUNTER — LAB (OUTPATIENT)
Dept: LAB | Facility: CLINIC | Age: 54
End: 2024-01-09
Payer: COMMERCIAL

## 2024-01-09 DIAGNOSIS — D64.9 ANEMIA DUE TO UNKNOWN MECHANISM: ICD-10-CM

## 2024-01-09 DIAGNOSIS — E87.1 HYPONATREMIA: ICD-10-CM

## 2024-01-09 DIAGNOSIS — D64.9 ANEMIA, UNSPECIFIED TYPE: ICD-10-CM

## 2024-01-09 DIAGNOSIS — K70.11 ALCOHOLIC HEPATITIS WITH ASCITES (H): ICD-10-CM

## 2024-01-09 DIAGNOSIS — K70.31 ALCOHOLIC CIRRHOSIS OF LIVER WITH ASCITES (H): ICD-10-CM

## 2024-01-09 DIAGNOSIS — R54 FRAILTY: ICD-10-CM

## 2024-01-09 LAB
ALBUMIN SERPL BCG-MCNC: 2.7 G/DL (ref 3.5–5.2)
ANION GAP SERPL CALCULATED.3IONS-SCNC: 9 MMOL/L (ref 7–15)
BASOPHILS # BLD AUTO: 0.1 10E3/UL (ref 0–0.2)
BASOPHILS NFR BLD AUTO: 1 %
BILIRUB SERPL-MCNC: 9.8 MG/DL
BUN SERPL-MCNC: 11.6 MG/DL (ref 6–20)
CALCIUM SERPL-MCNC: 9 MG/DL (ref 8.6–10)
CHLORIDE SERPL-SCNC: 93 MMOL/L (ref 98–107)
CREAT SERPL-MCNC: 0.72 MG/DL (ref 0.67–1.17)
DEPRECATED HCO3 PLAS-SCNC: 31 MMOL/L (ref 22–29)
EGFRCR SERPLBLD CKD-EPI 2021: >90 ML/MIN/1.73M2
EOSINOPHIL # BLD AUTO: 0.3 10E3/UL (ref 0–0.7)
EOSINOPHIL NFR BLD AUTO: 4 %
ERYTHROCYTE [DISTWIDTH] IN BLOOD BY AUTOMATED COUNT: 15.9 % (ref 10–15)
GLUCOSE SERPL-MCNC: 182 MG/DL (ref 70–99)
HCT VFR BLD AUTO: 25.1 % (ref 40–53)
HGB BLD-MCNC: 8.7 G/DL (ref 13.3–17.7)
IMM GRANULOCYTES # BLD: 0.1 10E3/UL
IMM GRANULOCYTES NFR BLD: 2 %
INR PPP: 2.44 (ref 0.85–1.15)
LYMPHOCYTES # BLD AUTO: 1.3 10E3/UL (ref 0.8–5.3)
LYMPHOCYTES NFR BLD AUTO: 15 %
MCH RBC QN AUTO: 36.4 PG (ref 26.5–33)
MCHC RBC AUTO-ENTMCNC: 34.7 G/DL (ref 31.5–36.5)
MCV RBC AUTO: 105 FL (ref 78–100)
MONOCYTES # BLD AUTO: 0.8 10E3/UL (ref 0–1.3)
MONOCYTES NFR BLD AUTO: 9 %
NEUTROPHILS # BLD AUTO: 5.7 10E3/UL (ref 1.6–8.3)
NEUTROPHILS NFR BLD AUTO: 69 %
NRBC # BLD AUTO: 0 10E3/UL
NRBC BLD AUTO-RTO: 0 /100
PLATELET # BLD AUTO: 84 10E3/UL (ref 150–450)
POTASSIUM SERPL-SCNC: 3.3 MMOL/L (ref 3.4–5.3)
RBC # BLD AUTO: 2.39 10E6/UL (ref 4.4–5.9)
SODIUM SERPL-SCNC: 133 MMOL/L (ref 135–145)
WBC # BLD AUTO: 8.3 10E3/UL (ref 4–11)

## 2024-01-09 PROCEDURE — 82247 BILIRUBIN TOTAL: CPT

## 2024-01-09 PROCEDURE — 85610 PROTHROMBIN TIME: CPT

## 2024-01-09 PROCEDURE — 82040 ASSAY OF SERUM ALBUMIN: CPT

## 2024-01-09 PROCEDURE — 86481 TB AG RESPONSE T-CELL SUSP: CPT

## 2024-01-09 PROCEDURE — 85025 COMPLETE CBC W/AUTO DIFF WBC: CPT

## 2024-01-09 PROCEDURE — 36415 COLL VENOUS BLD VENIPUNCTURE: CPT

## 2024-01-09 PROCEDURE — 80048 BASIC METABOLIC PNL TOTAL CA: CPT

## 2024-01-11 ENCOUNTER — MYC MEDICAL ADVICE (OUTPATIENT)
Dept: FAMILY MEDICINE | Facility: CLINIC | Age: 54
End: 2024-01-11
Payer: COMMERCIAL

## 2024-01-11 LAB
GAMMA INTERFERON BACKGROUND BLD IA-ACNC: 0.05 IU/ML
M TB IFN-G BLD-IMP: ABNORMAL
M TB IFN-G CD4+ BCKGRND COR BLD-ACNC: 0.29 IU/ML
MITOGEN IGNF BCKGRD COR BLD-ACNC: 0 IU/ML
MITOGEN IGNF BCKGRD COR BLD-ACNC: 0 IU/ML
QUANTIFERON MITOGEN: 0.34 IU/ML
QUANTIFERON NIL TUBE: 0.05 IU/ML
QUANTIFERON TB1 TUBE: 0.05 IU/ML
QUANTIFERON TB2 TUBE: 0.05

## 2024-01-11 NOTE — TELEPHONE ENCOUNTER
Routing to PCP as an update.     Celsa Jiménez, EDDAN, RN   [facility]

## 2024-01-15 ENCOUNTER — DOCUMENTATION ONLY (OUTPATIENT)
Dept: TRANSPLANT | Facility: CLINIC | Age: 54
End: 2024-01-15
Payer: COMMERCIAL

## 2024-01-15 NOTE — PROGRESS NOTES
Transplant Social Work Services Progress Note      Date of Initial Social Work Evaluation: 12/7/2023  Collaborated with: Liver Transplant Team    Data: Mary is being evaluated for a liver transplant. Records received from [facility].  Intervention: Records from [facility] were reviewed and forwarded to medical records for scanning into [employer]. I notified patient and wife Adina via [employer].  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  His PEth tests in July 2023, November 2023, December 2023 and January 2024 have all been negative.  Mary completed a substance use assessment at [facility] on 11/9/23, and he was recommended to begin individual therapy.  Mary established care with a therapy provider at [facility] (Cassie Dangelo [facility]) on 12/6/23.  Mary is recommended to have weekly sessions as his health allows.   Mary also chews a tin of tobacco every three days, and he was previously advised to work on a plan to quit.     Health Care Directive is on file in Epic.  Plan: I will remain involved throughout Mary's liver transplant evaluation.        CAPRI Brock, [facility]  Liver Transplant   Phone [phone]

## 2024-01-16 ENCOUNTER — VIRTUAL VISIT (OUTPATIENT)
Dept: FAMILY MEDICINE | Facility: CLINIC | Age: 54
End: 2024-01-16
Payer: COMMERCIAL

## 2024-01-16 DIAGNOSIS — Z79.4 TYPE 2 DIABETES MELLITUS WITHOUT COMPLICATION, WITH LONG-TERM CURRENT USE OF INSULIN (H): ICD-10-CM

## 2024-01-16 DIAGNOSIS — K74.60 DECOMPENSATED HEPATIC CIRRHOSIS (H): ICD-10-CM

## 2024-01-16 DIAGNOSIS — G47.00 PERSISTENT INSOMNIA: Chronic | ICD-10-CM

## 2024-01-16 DIAGNOSIS — E11.9 TYPE 2 DIABETES MELLITUS WITHOUT COMPLICATION, WITH LONG-TERM CURRENT USE OF INSULIN (H): ICD-10-CM

## 2024-01-16 DIAGNOSIS — K72.90 DECOMPENSATED HEPATIC CIRRHOSIS (H): ICD-10-CM

## 2024-01-16 DIAGNOSIS — R79.1 ELEVATED INR: ICD-10-CM

## 2024-01-16 DIAGNOSIS — K76.82 HEPATIC ENCEPHALOPATHY (H): Primary | ICD-10-CM

## 2024-01-16 DIAGNOSIS — K76.6 PORTAL HYPERTENSIVE GASTROPATHY (H): Chronic | ICD-10-CM

## 2024-01-16 DIAGNOSIS — E87.1 HYPONATREMIA: ICD-10-CM

## 2024-01-16 DIAGNOSIS — K31.89 PORTAL HYPERTENSIVE GASTROPATHY (H): Chronic | ICD-10-CM

## 2024-01-16 PROBLEM — R73.01 IFG (IMPAIRED FASTING GLUCOSE): Status: RESOLVED | Noted: 2021-12-07 | Resolved: 2024-01-16

## 2024-01-16 PROCEDURE — 99496 TRANSJ CARE MGMT HIGH F2F 7D: CPT | Mod: 95 | Performed by: INTERNAL MEDICINE

## 2024-01-16 NOTE — PROGRESS NOTES
"    Instructions Relayed to Patient by Virtual Roomer:     Patient is active on [employer]:   Relayed following to patient: \"It looks like you are active on [employer], are you able to join the visit this way? If not, do you need us to send you a link now or would you like your provider to send a link via text or email when they are ready to initiate the visit?\"    Reminded patient to ensure they were logged on to virtual visit by arrival time listed. Documented in appointment notes if patient had flexibility to initiate visit sooner than arrival time. If pediatric virtual visit, ensured pediatric patient along with parent/guardian will be present for video visit.     Patient offered the website [email or web address] and/or phone number to [employer] Help line: [phone]      Mary is a 53 year old who is being evaluated via a billable video visit.      How would you like to obtain your AVS? [employer]  If the video visit is dropped, the invitation should be resent by: Text to cell phone: [phone]  Will anyone else be joining your video visit? No          Assessment & Plan     1.  Hepatic encephalopathy.  Patient much improved following recent hospitalization.  Has restarted torsemide and potassium supplement.  Lab will be performed in couple of days.  The precipitating cause for encephalopathy at this time is not clear.  Spontaneous bacterial peritonitis was suspected and treated with antibiotics.  Patient is currently on rifaximin and also lactulose.  Advised to keep up with lactulose so he has 2-3 bowel movements a day.  2.  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  Seen by transplant clinic.  He is undergoing workup now.  3.  Hyponatremia chronic related to #2.  It is more of a hemodilution hyponatremia so torsemide has helped.  4.  Portal hypertensive gastropathy.  His hemoglobin is stable at 8.7.  5.  Persistent insomnia unchanged better with melatonin and doxepin.  6.  Elevated INR " secondary to liver disease.  Most recent INR 2.44 on 1/9/2024.  Some concern about nosebleed.  His wife is going to contact ENT, Dr. Lynn.  7.  Type 2 diabetes mellitus being treated with short and long-acting insulin and followed by endocrinology closely.  Glycemic control is improved.       MED REC REQUIRED  Post Medication Reconciliation Status:       Jimmie Hoyt MD  [facility] MAYA Hernandez is a 53 year old, presenting for the following health issues:  No chief complaint on file.      1/16/2024     1:25 PM   Additional Questions   Roomed by [facility] Follow-up Visit:    Hospital/Nursing Home/IP Rehab Facility:  [facility]  Date of Admission: 1/11/2024  Date of Discharge: 1/14/2024  Reason(s) for Admission: Hepatic encephalopathy     Was your hospitalization related to COVID-19? No   Problems taking medications regularly:  None  Medication changes since discharge: None  Problems adhering to non-medication therapy:  None    Summary of hospitalization:  [facility] discharge summary reviewed  Diagnostic Tests/Treatments reviewed.  Follow up needed: yes  Other Healthcare Providers Involved in Patient s Care:          Transplant   Update since discharge: improved.         Plan of care communicated with patient             Patient recently hospitalized with hepatic encephalopathy.  He was treated and released after 4 days.  Clinically much improved.  Once home torsemide and potassium supplement has been restarted.  He does have edema but improving since starting torsemide.  Mentally he is better.  Discussed using lactulose 3-4 times a day or more if need be at 30 mL each time to have at least 2-3 bowel movements a day.  He is also on rifaximin.  The wife was concerned about possible nosebleed.  In the past had been cauterized.  His INR is chronically elevated due to liver disease.  Last INR was 2.44.  She will contact ENT, Dr. Lynn if he  starts having nosebleeds.  Currently he feels he is doing okay.      Review of Systems             Objective           Vitals:  No vitals were obtained today due to virtual visit.    Physical Exam   GENERAL: Healthy, alert and no distress  EYES: Eyes grossly normal to inspection.  No discharge or erythema, or obvious scleral/conjunctival abnormalities.  RESP: No audible wheeze, cough, or visible cyanosis.  No visible retractions or increased work of breathing.    SKIN: Visible skin clear. No significant rash, abnormal pigmentation or lesions.  NEURO: Cranial nerves grossly intact.  Mentation and speech appropriate for age.  PSYCH: Mentation appears normal, affect normal/bright, judgement and insight intact, normal speech and appearance well-groomed.          Video-Visit Details    Type of service:  Video Visit     Originating Location (pt. Location): Home    Distant Location (provider location):  On-site  Platform used for Video Visit: Yenifer

## 2024-01-18 ENCOUNTER — ANCILLARY PROCEDURE (OUTPATIENT)
Dept: BONE DENSITY | Facility: CLINIC | Age: 54
End: 2024-01-18
Attending: INTERNAL MEDICINE
Payer: COMMERCIAL

## 2024-01-18 ENCOUNTER — PATIENT OUTREACH (OUTPATIENT)
Dept: CARE COORDINATION | Facility: CLINIC | Age: 54
End: 2024-01-18

## 2024-01-18 ENCOUNTER — TELEPHONE (OUTPATIENT)
Dept: TRANSPLANT | Facility: CLINIC | Age: 54
End: 2024-01-18

## 2024-01-18 ENCOUNTER — LAB (OUTPATIENT)
Dept: LAB | Facility: CLINIC | Age: 54
End: 2024-01-18
Payer: COMMERCIAL

## 2024-01-18 DIAGNOSIS — E87.6 HYPOKALEMIA: ICD-10-CM

## 2024-01-18 DIAGNOSIS — K70.31 ALCOHOLIC CIRRHOSIS OF LIVER WITH ASCITES (H): ICD-10-CM

## 2024-01-18 DIAGNOSIS — D64.9 ANEMIA, UNSPECIFIED TYPE: ICD-10-CM

## 2024-01-18 DIAGNOSIS — E87.1 HYPONATREMIA: ICD-10-CM

## 2024-01-18 DIAGNOSIS — K76.6 PORTAL HYPERTENSION (H): ICD-10-CM

## 2024-01-18 DIAGNOSIS — D64.9 ANEMIA DUE TO UNKNOWN MECHANISM: ICD-10-CM

## 2024-01-18 DIAGNOSIS — K70.11 ALCOHOLIC HEPATITIS WITH ASCITES (H): ICD-10-CM

## 2024-01-18 DIAGNOSIS — K70.11 ALCOHOLIC HEPATITIS WITH ASCITES (H): Primary | ICD-10-CM

## 2024-01-18 DIAGNOSIS — R54 FRAILTY: ICD-10-CM

## 2024-01-18 DIAGNOSIS — R76.12 (QFT) QUANTIFERON-TB TEST REACTION WITHOUT ACTIVE TUBERCULOSIS: Primary | ICD-10-CM

## 2024-01-18 LAB
ALBUMIN SERPL BCG-MCNC: 2.4 G/DL (ref 3.5–5.2)
ANION GAP SERPL CALCULATED.3IONS-SCNC: 7 MMOL/L (ref 7–15)
BASOPHILS # BLD AUTO: 0.1 10E3/UL (ref 0–0.2)
BASOPHILS NFR BLD AUTO: 1 %
BILIRUB SERPL-MCNC: 8.3 MG/DL
BUN SERPL-MCNC: 9.9 MG/DL (ref 6–20)
CALCIUM SERPL-MCNC: 8.2 MG/DL (ref 8.6–10)
CHLORIDE SERPL-SCNC: 94 MMOL/L (ref 98–107)
CREAT SERPL-MCNC: 0.7 MG/DL (ref 0.67–1.17)
DEPRECATED HCO3 PLAS-SCNC: 30 MMOL/L (ref 22–29)
EGFRCR SERPLBLD CKD-EPI 2021: >90 ML/MIN/1.73M2
EOSINOPHIL # BLD AUTO: 0.4 10E3/UL (ref 0–0.7)
EOSINOPHIL NFR BLD AUTO: 4 %
ERYTHROCYTE [DISTWIDTH] IN BLOOD BY AUTOMATED COUNT: 16.6 % (ref 10–15)
GLUCOSE SERPL-MCNC: 176 MG/DL (ref 70–99)
HCT VFR BLD AUTO: 23.3 % (ref 40–53)
HGB BLD-MCNC: 8.5 G/DL (ref 13.3–17.7)
IMM GRANULOCYTES # BLD: 0.4 10E3/UL
IMM GRANULOCYTES NFR BLD: 3 %
INR PPP: 2.77 (ref 0.85–1.15)
LYMPHOCYTES # BLD AUTO: 1.5 10E3/UL (ref 0.8–5.3)
LYMPHOCYTES NFR BLD AUTO: 14 %
MCH RBC QN AUTO: 37.9 PG (ref 26.5–33)
MCHC RBC AUTO-ENTMCNC: 36.5 G/DL (ref 31.5–36.5)
MCV RBC AUTO: 104 FL (ref 78–100)
MONOCYTES # BLD AUTO: 0.8 10E3/UL (ref 0–1.3)
MONOCYTES NFR BLD AUTO: 8 %
NEUTROPHILS # BLD AUTO: 7.2 10E3/UL (ref 1.6–8.3)
NEUTROPHILS NFR BLD AUTO: 70 %
NRBC # BLD AUTO: 0 10E3/UL
NRBC BLD AUTO-RTO: 0 /100
PLATELET # BLD AUTO: 83 10E3/UL (ref 150–450)
POTASSIUM SERPL-SCNC: 2.8 MMOL/L (ref 3.4–5.3)
RBC # BLD AUTO: 2.24 10E6/UL (ref 4.4–5.9)
SODIUM SERPL-SCNC: 131 MMOL/L (ref 135–145)
WBC # BLD AUTO: 10.3 10E3/UL (ref 4–11)

## 2024-01-18 PROCEDURE — 99000 SPECIMEN HANDLING OFFICE-LAB: CPT | Performed by: PATHOLOGY

## 2024-01-18 PROCEDURE — 36415 COLL VENOUS BLD VENIPUNCTURE: CPT | Performed by: PATHOLOGY

## 2024-01-18 PROCEDURE — 82040 ASSAY OF SERUM ALBUMIN: CPT | Performed by: PATHOLOGY

## 2024-01-18 PROCEDURE — 80048 BASIC METABOLIC PNL TOTAL CA: CPT | Performed by: PATHOLOGY

## 2024-01-18 PROCEDURE — 85025 COMPLETE CBC W/AUTO DIFF WBC: CPT | Performed by: PATHOLOGY

## 2024-01-18 PROCEDURE — 77080 DXA BONE DENSITY AXIAL: CPT | Performed by: INTERNAL MEDICINE

## 2024-01-18 PROCEDURE — 82247 BILIRUBIN TOTAL: CPT | Performed by: PATHOLOGY

## 2024-01-18 PROCEDURE — G0480 DRUG TEST DEF 1-7 CLASSES: HCPCS | Mod: 90 | Performed by: PATHOLOGY

## 2024-01-18 PROCEDURE — 85610 PROTHROMBIN TIME: CPT | Performed by: PATHOLOGY

## 2024-01-18 RX ORDER — POTASSIUM CHLORIDE 750 MG/1
20 TABLET, EXTENDED RELEASE ORAL 2 TIMES DAILY
Qty: 240 TABLET | Refills: 1 | Status: ON HOLD | OUTPATIENT
Start: 2024-01-18 | End: 2024-03-21

## 2024-01-18 RX ORDER — SPIRONOLACTONE 25 MG/1
25 TABLET ORAL DAILY
Qty: 90 TABLET | Refills: 1 | Status: ON HOLD | OUTPATIENT
Start: 2024-01-18 | End: 2024-03-21

## 2024-01-18 NOTE — PROGRESS NOTES
Clinic Care Coordination Contact  [facility]/Voicemail    Clinical Data: Care Coordinator Outreach    Outreach Documentation Number of Outreach Attempt   11/20/2023   4:10 PM 1   1/18/2024  10:21 AM 1       Left message on patient's voicemail with call back information and requested return call.    Plan:  Care Coordinator will do no further outreaches at this time.    Heber Ash CALI  Clinic Care Coordination  [facility]  [email or web address]  [phone]

## 2024-01-18 NOTE — PROGRESS NOTES
Potassium is low due to loop diuretic. Will increase Potassium to 20 Meq bid and add Spironolectone 25 mg once a day. Recheck BMP in one week.

## 2024-01-19 ENCOUNTER — HOSPITAL ENCOUNTER (OUTPATIENT)
Dept: CT IMAGING | Facility: CLINIC | Age: 54
Discharge: HOME | End: 2024-01-19
Attending: INTERNAL MEDICINE | Admitting: INTERNAL MEDICINE
Payer: COMMERCIAL

## 2024-01-19 VITALS — SYSTOLIC BLOOD PRESSURE: 82 MMHG | DIASTOLIC BLOOD PRESSURE: 51 MMHG | HEART RATE: 61 BPM

## 2024-01-19 DIAGNOSIS — K76.6 PORTAL HYPERTENSION (H): ICD-10-CM

## 2024-01-19 DIAGNOSIS — K70.31 ALCOHOLIC CIRRHOSIS OF LIVER WITH ASCITES (H): ICD-10-CM

## 2024-01-19 PROCEDURE — 250N000011 HC RX IP 250 OP 636: Performed by: INTERNAL MEDICINE

## 2024-01-19 PROCEDURE — 75574 CT ANGIO HRT W/3D IMAGE: CPT | Mod: 26 | Performed by: INTERNAL MEDICINE

## 2024-01-19 PROCEDURE — 250N000013 HC RX MED GY IP 250 OP 250 PS 637: Performed by: INTERNAL MEDICINE

## 2024-01-19 PROCEDURE — 999N000248 HC STATISTIC IV INSERT WITH US BY RN

## 2024-01-19 PROCEDURE — 75574 CT ANGIO HRT W/3D IMAGE: CPT

## 2024-01-19 RX ORDER — METOPROLOL TARTRATE 25 MG/1
25-100 TABLET, FILM COATED ORAL
Status: COMPLETED | OUTPATIENT
Start: 2024-01-19 | End: 2024-01-19

## 2024-01-19 RX ORDER — DILTIAZEM HYDROCHLORIDE 120 MG/1
120 TABLET, FILM COATED ORAL
Status: DISCONTINUED | OUTPATIENT
Start: 2024-01-19 | End: 2024-01-20 | Stop reason: HOSPADM

## 2024-01-19 RX ORDER — NITROGLYCERIN 0.4 MG/1
.4-.8 TABLET SUBLINGUAL
Status: DISCONTINUED | OUTPATIENT
Start: 2024-01-19 | End: 2024-01-20 | Stop reason: HOSPADM

## 2024-01-19 RX ORDER — METHYLPREDNISOLONE SODIUM SUCCINATE 125 MG/2ML
125 INJECTION, POWDER, LYOPHILIZED, FOR SOLUTION INTRAMUSCULAR; INTRAVENOUS
Status: DISCONTINUED | OUTPATIENT
Start: 2024-01-19 | End: 2024-01-20 | Stop reason: HOSPADM

## 2024-01-19 RX ORDER — IVABRADINE 5 MG/1
5-15 TABLET, FILM COATED ORAL
Status: COMPLETED | OUTPATIENT
Start: 2024-01-19 | End: 2024-01-19

## 2024-01-19 RX ORDER — DIPHENHYDRAMINE HCL 25 MG
25 CAPSULE ORAL
Status: DISCONTINUED | OUTPATIENT
Start: 2024-01-19 | End: 2024-01-20 | Stop reason: HOSPADM

## 2024-01-19 RX ORDER — ONDANSETRON 2 MG/ML
4 INJECTION INTRAMUSCULAR; INTRAVENOUS
Status: DISCONTINUED | OUTPATIENT
Start: 2024-01-19 | End: 2024-01-20 | Stop reason: HOSPADM

## 2024-01-19 RX ORDER — METOPROLOL TARTRATE 1 MG/ML
5-15 INJECTION, SOLUTION INTRAVENOUS
Status: DISCONTINUED | OUTPATIENT
Start: 2024-01-19 | End: 2024-01-20 | Stop reason: HOSPADM

## 2024-01-19 RX ORDER — DILTIAZEM HYDROCHLORIDE 5 MG/ML
10-15 INJECTION INTRAVENOUS
Status: DISCONTINUED | OUTPATIENT
Start: 2024-01-19 | End: 2024-01-20 | Stop reason: HOSPADM

## 2024-01-19 RX ORDER — ACYCLOVIR 200 MG/1
0-1 CAPSULE ORAL
Status: DISCONTINUED | OUTPATIENT
Start: 2024-01-19 | End: 2024-01-20 | Stop reason: HOSPADM

## 2024-01-19 RX ORDER — IOPAMIDOL 755 MG/ML
120 INJECTION, SOLUTION INTRAVASCULAR ONCE
Status: COMPLETED | OUTPATIENT
Start: 2024-01-19 | End: 2024-01-19

## 2024-01-19 RX ORDER — DIPHENHYDRAMINE HYDROCHLORIDE 50 MG/ML
25-50 INJECTION INTRAMUSCULAR; INTRAVENOUS
Status: DISCONTINUED | OUTPATIENT
Start: 2024-01-19 | End: 2024-01-20 | Stop reason: HOSPADM

## 2024-01-19 RX ADMIN — IVABRADINE 15 MG: 5 TABLET, FILM COATED ORAL at 12:10

## 2024-01-19 RX ADMIN — NITROGLYCERIN 0.8 MG: 0.4 TABLET SUBLINGUAL at 13:09

## 2024-01-19 RX ADMIN — METOPROLOL TARTRATE 100 MG: 100 TABLET, FILM COATED ORAL at 12:10

## 2024-01-19 RX ADMIN — IOPAMIDOL 120 ML: 755 INJECTION, SOLUTION INTRAVENOUS at 13:05

## 2024-01-19 NOTE — PROGRESS NOTES
Pt arrived for Coronary CT angiogram. Test, meds and side effects reviewed with pt. Resting HR 97 bpm. Given 100 mg PO Metoprolol + 15 mg PO Ivabradine per verbal order. Administered 0.8 mg SL nitro on CTA table per order. CTA completed. Patient tolerated procedure well and denies symptoms of allergic reaction. Post monitoring completed and VSS. D/C instructions reviewed with pt whom verbalized understanding of need to increase PO fluids today. D/C to gold waiting room accompanied by staff.

## 2024-01-22 ENCOUNTER — THERAPY VISIT (OUTPATIENT)
Dept: PHYSICAL THERAPY | Facility: CLINIC | Age: 54
End: 2024-01-22
Payer: COMMERCIAL

## 2024-01-22 DIAGNOSIS — M62.81 GENERALIZED MUSCLE WEAKNESS: Primary | ICD-10-CM

## 2024-01-22 DIAGNOSIS — I27.20 PULMONARY HTN (H): Primary | ICD-10-CM

## 2024-01-22 PROCEDURE — 97535 SELF CARE MNGMENT TRAINING: CPT | Mod: GP | Performed by: PHYSICAL THERAPIST

## 2024-01-22 PROCEDURE — 97162 PT EVAL MOD COMPLEX 30 MIN: CPT | Mod: GP | Performed by: PHYSICAL THERAPIST

## 2024-01-22 RX ORDER — LIDOCAINE 40 MG/G
CREAM TOPICAL
Status: CANCELLED | OUTPATIENT
Start: 2024-01-22

## 2024-01-23 PROBLEM — M62.81 GENERALIZED MUSCLE WEAKNESS: Status: ACTIVE | Noted: 2024-01-23

## 2024-01-24 ENCOUNTER — MYC MEDICAL ADVICE (OUTPATIENT)
Dept: CARDIOLOGY | Facility: CLINIC | Age: 54
End: 2024-01-24
Payer: COMMERCIAL

## 2024-01-24 NOTE — TELEPHONE ENCOUNTER
ID consult placed given below.     Kaitlynn Gardiner MD Klint, Keaton Luna, RN  Vicente Pugh~  Sorry that I have been off of epic for a week or so.  I think it would be a good idea to have him seen by [facility], because his last chest CT from 9/2023 was heavily involved by pneumonia, and that too would want to be settled out as ok prior to transplant.  Regards,  Happy New Year,  Kaitlynn Gardiner

## 2024-01-24 NOTE — PROGRESS NOTES
PHYSICAL THERAPY EVALUATION  Type of Visit: Evaluation    See electronic medical record for Abuse and Falls Screening details.    Subjective       Presenting condition or subjective complaint:  Patient presents to outpatient physical therapy with overall deconditioning and frailty consistent with alcoholic cirrhosis of the liver. Hospitalized 1/11-1/15 for hepatic encephalopathy resulting in further deconditioning. Hopes to start therapy in order to further qualify for liver transplant  Date of onset: 12/19/23    Relevant medical history:   DMII    Prior therapy history for the same diagnosis, illness or injury:    no    Prior Level of Function  IADL:  yardwork, housework, working in waste managment    Living Environment  Social support:   spouse  Employment:    medical leave    Patient goals for therapy:  walk and help wife more       Objective      Cognitive Status Examination  Orientation: Oriented to person, place and time   Level of Consciousness: Alert  Follows Commands and Answers Questions: 100% of the time  Personal Safety and Judgement: Intact  Memory: Intact      RANGE OF MOTION: LE ROM WFL  UE ROM WFL  STRENGTH:  HF bilat 3/5, KF 3/5, DF 4+ and PF     BED MOBILITY: Independent    TRANSFERS: Independent but slow      GAIT:   Level of Archer: Independent  Assistive Device(s): None  Gait Deviations: Antalgic  Base of support increased  Stride length decreased  Junie decreased  Stairs: able to complete with railings    BALANCE:  mCTSIB cond 1 30, cond 2 8, cond 4 30, cond 5 3 ; unable to complete SLS bilat   Functional Gait Assessment 18/30 indicating high fall risk    Assessment & Plan   CLINICAL IMPRESSIONS  Medical Diagnosis: Alcoholic Cirrohosis    Treatment Diagnosis: Force Production Deficit   Impression/Assessment: Patient is a 53 year old male with weakness and balance complaints.  The following significant findings have been identified: Impaired gait, Impaired muscle performance, and  Decreased activity tolerance. These impairments interfere with their ability to perform self care tasks, household chores, driving , household mobility, and community mobility as compared to previous level of function.     Clinical Decision Making (Complexity):  Clinical Presentation: Evolving/Changing  Clinical Presentation Rationale: based on medical and personal factors listed in PT evaluation  Clinical Decision Making (Complexity): Moderate complexity    PLAN OF CARE  Treatment Interventions:  Interventions: Gait Training, Neuromuscular Re-education, Therapeutic Activity, Therapeutic Exercise, Self-Care/Home Management    Long Term Goals     PT Goal 1  Goal Identifier: Strength  Goal Description: Patient will improve 5TSTST time indicating functional Improvement to strength and power  Target Date: 04/20/24      Frequency of Treatment: 1-2x/wk  Duration of Treatment: up to 90 days    Recommended Referrals to Other Professionals:   Education Assessment:   Learner/Method: Patient  Education Comments: Eval findings, age-matched scores, goals, POC    Risks and benefits of evaluation/treatment have been explained.   Patient/Family/caregiver agrees with Plan of Care.     Evaluation Time:     PT Lamin, Moderate Complexity Minutes (17818): 30       Signing Clinician: DEON LEYVA PT

## 2024-01-25 ENCOUNTER — TELEPHONE (OUTPATIENT)
Dept: FAMILY MEDICINE | Facility: CLINIC | Age: 54
End: 2024-01-25

## 2024-01-25 ENCOUNTER — LAB (OUTPATIENT)
Dept: LAB | Facility: CLINIC | Age: 54
End: 2024-01-25
Payer: COMMERCIAL

## 2024-01-25 ENCOUNTER — RESULT FOLLOW UP (OUTPATIENT)
Dept: ONCOLOGY | Facility: HOSPITAL | Age: 54
End: 2024-01-25

## 2024-01-25 DIAGNOSIS — D64.9 ANEMIA DUE TO UNKNOWN MECHANISM: ICD-10-CM

## 2024-01-25 DIAGNOSIS — E87.1 HYPONATREMIA: ICD-10-CM

## 2024-01-25 DIAGNOSIS — K70.31 ALCOHOLIC CIRRHOSIS OF LIVER WITH ASCITES (H): ICD-10-CM

## 2024-01-25 DIAGNOSIS — K70.11 ALCOHOLIC HEPATITIS WITH ASCITES (H): ICD-10-CM

## 2024-01-25 DIAGNOSIS — R54 FRAILTY: ICD-10-CM

## 2024-01-25 DIAGNOSIS — D64.9 ANEMIA, UNSPECIFIED TYPE: ICD-10-CM

## 2024-01-25 LAB
ALBUMIN SERPL BCG-MCNC: 2.4 G/DL (ref 3.5–5.2)
ANION GAP SERPL CALCULATED.3IONS-SCNC: 5 MMOL/L (ref 7–15)
BASOPHILS # BLD AUTO: 0.1 10E3/UL (ref 0–0.2)
BASOPHILS NFR BLD AUTO: 1 %
BILIRUB SERPL-MCNC: 9.4 MG/DL
BUN SERPL-MCNC: 8.4 MG/DL (ref 6–20)
CALCIUM SERPL-MCNC: 8.2 MG/DL (ref 8.6–10)
CHLORIDE SERPL-SCNC: 93 MMOL/L (ref 98–107)
CREAT SERPL-MCNC: 0.94 MG/DL (ref 0.67–1.17)
DEPRECATED HCO3 PLAS-SCNC: 29 MMOL/L (ref 22–29)
EGFRCR SERPLBLD CKD-EPI 2021: >90 ML/MIN/1.73M2
EOSINOPHIL # BLD AUTO: 0.3 10E3/UL (ref 0–0.7)
EOSINOPHIL NFR BLD AUTO: 3 %
ERYTHROCYTE [DISTWIDTH] IN BLOOD BY AUTOMATED COUNT: 14.7 % (ref 10–15)
GLUCOSE SERPL-MCNC: 339 MG/DL (ref 70–99)
HCT VFR BLD AUTO: 24 % (ref 40–53)
HGB BLD-MCNC: 7.9 G/DL (ref 13.3–17.7)
IMM GRANULOCYTES # BLD: 0.1 10E3/UL
IMM GRANULOCYTES NFR BLD: 1 %
INR PPP: 3.05 (ref 0.85–1.15)
LYMPHOCYTES # BLD AUTO: 1 10E3/UL (ref 0.8–5.3)
LYMPHOCYTES NFR BLD AUTO: 13 %
MCH RBC QN AUTO: 35.3 PG (ref 26.5–33)
MCHC RBC AUTO-ENTMCNC: 32.9 G/DL (ref 31.5–36.5)
MCV RBC AUTO: 107 FL (ref 78–100)
MONOCYTES # BLD AUTO: 0.7 10E3/UL (ref 0–1.3)
MONOCYTES NFR BLD AUTO: 10 %
NEUTROPHILS # BLD AUTO: 5.5 10E3/UL (ref 1.6–8.3)
NEUTROPHILS NFR BLD AUTO: 72 %
NRBC # BLD AUTO: 0 10E3/UL
NRBC BLD AUTO-RTO: 0 /100
PLATELET # BLD AUTO: 71 10E3/UL (ref 150–450)
POTASSIUM SERPL-SCNC: 4.1 MMOL/L (ref 3.4–5.3)
RBC # BLD AUTO: 2.24 10E6/UL (ref 4.4–5.9)
SODIUM SERPL-SCNC: 127 MMOL/L (ref 135–145)
WBC # BLD AUTO: 7.5 10E3/UL (ref 4–11)

## 2024-01-25 PROCEDURE — 82247 BILIRUBIN TOTAL: CPT

## 2024-01-25 PROCEDURE — 36415 COLL VENOUS BLD VENIPUNCTURE: CPT

## 2024-01-25 PROCEDURE — 82040 ASSAY OF SERUM ALBUMIN: CPT

## 2024-01-25 PROCEDURE — 85610 PROTHROMBIN TIME: CPT

## 2024-01-25 PROCEDURE — 80048 BASIC METABOLIC PNL TOTAL CA: CPT

## 2024-01-25 PROCEDURE — 85025 COMPLETE CBC W/AUTO DIFF WBC: CPT

## 2024-01-25 NOTE — TELEPHONE ENCOUNTER
Dr. Hoyt is out of the office.   Reviewed his chart. Hemoglobin 7.9 on 1/13/2024 at [facility]. Cirrhosis, hepatic encephalopathy at the time, no active bleeding. Subsequent hemoglobin 8.5.     Spoke with the patient's wife. Overall health hasn't changed. No active bleeding in terms of hematemesis,     1 1/2 week ago had nose bleed, he bled a lot but recovered.     Pt is scheduled for right heart cath on 1/30/2024.   Patient's wife is asking if he needs to get a blood transfusion before that. Dr. Munoz had mentioned to the family about transfusion if hemoglobin drops below 8.     I recommend I send this back to hematology regarding this. I'm covering for Dr. Hoyt, has not seen this patient before. This will be best addressed by hematology.     Reviewed BMP. Sodium 127 glucose 339. Corrected Sodium 131 or 133 based on the two different correction formation. This is at baseline.

## 2024-01-25 NOTE — TELEPHONE ENCOUNTER
Maribell with [facility] lab calling to report critical Hgb at 7.9 on 1/25/24 CBC ordered by Dr. Munoz. However, critical is for Dr. Hoyt.     Maribell reports calling Dr. Morrissey's team to report critical, but Maribell was advised to contact PCP as Dr. Munoz is not following patient anymore.     Maribell did let Dr. Munoz's office know that they need to delete/cancel their standing orders.       Dr. Hoyt is on STO. Routing to  covering pool and second level triage to further advise. Thanks!      Melanie Murphy RN    [facility]

## 2024-01-26 ENCOUNTER — PRE VISIT (OUTPATIENT)
Dept: INFECTIOUS DISEASES | Facility: CLINIC | Age: 54
End: 2024-01-26

## 2024-01-26 ENCOUNTER — OFFICE VISIT (OUTPATIENT)
Dept: INFECTIOUS DISEASES | Facility: CLINIC | Age: 54
End: 2024-01-26
Attending: INTERNAL MEDICINE
Payer: COMMERCIAL

## 2024-01-26 ENCOUNTER — VIRTUAL VISIT (OUTPATIENT)
Dept: EDUCATION SERVICES | Facility: CLINIC | Age: 54
End: 2024-01-26
Payer: COMMERCIAL

## 2024-01-26 VITALS
SYSTOLIC BLOOD PRESSURE: 111 MMHG | DIASTOLIC BLOOD PRESSURE: 69 MMHG | TEMPERATURE: 98.5 F | HEART RATE: 89 BPM | OXYGEN SATURATION: 97 % | WEIGHT: 223 LBS | BODY MASS INDEX: 33.8 KG/M2

## 2024-01-26 DIAGNOSIS — Z79.4 TYPE 2 DIABETES MELLITUS WITH HYPERGLYCEMIA, WITH LONG-TERM CURRENT USE OF INSULIN (H): Primary | ICD-10-CM

## 2024-01-26 DIAGNOSIS — K76.6 PORTAL HYPERTENSION (H): ICD-10-CM

## 2024-01-26 DIAGNOSIS — E11.65 TYPE 2 DIABETES MELLITUS WITH HYPERGLYCEMIA, WITH LONG-TERM CURRENT USE OF INSULIN (H): Primary | ICD-10-CM

## 2024-01-26 DIAGNOSIS — K70.31 ALCOHOLIC CIRRHOSIS OF LIVER WITH ASCITES (H): ICD-10-CM

## 2024-01-26 DIAGNOSIS — Z76.82 ORGAN TRANSPLANT CANDIDATE: ICD-10-CM

## 2024-01-26 DIAGNOSIS — R76.12 (QFT) QUANTIFERON-TB TEST REACTION WITHOUT ACTIVE TUBERCULOSIS: Primary | ICD-10-CM

## 2024-01-26 PROCEDURE — G0108 DIAB MANAGE TRN  PER INDIV: HCPCS | Mod: 95 | Performed by: NUTRITIONIST

## 2024-01-26 PROCEDURE — 99213 OFFICE O/P EST LOW 20 MIN: CPT | Performed by: STUDENT IN AN ORGANIZED HEALTH CARE EDUCATION/TRAINING PROGRAM

## 2024-01-26 PROCEDURE — 99204 OFFICE O/P NEW MOD 45 MIN: CPT | Mod: GC | Performed by: STUDENT IN AN ORGANIZED HEALTH CARE EDUCATION/TRAINING PROGRAM

## 2024-01-26 ASSESSMENT — PAIN SCALES - GENERAL: PAINLEVEL: WORST PAIN (10)

## 2024-01-26 NOTE — LETTER
1/26/2024       RE: Mary Lopez  [address]  [place] [address]     Dear Colleague,    Thank you for referring your patient, Mary Lopez, to the [facility] [place] at [facility]. Please see a copy of my visit note below.        Transplant Infectious Disease Consultation note  Today's Date: 01/26/2024       Staff Attestation    I saw Mary Lopez in the ID clinic on January 26, 2024, with the resident Dr. Hwang. I have examined the patient and reviewed the history, vital signs, medications, labs and imaging. Assessment and plan were jointly made. I agree with and have edited the note and plan of care.     Janeth Jacobs   , [facility]  Pager - [phone]    Recommendations:    Indeterminate Quant Gold test x2 due to anergy/immune dysregulation likely secondary to liver cirrhosis.Reviewed imaging, CT Chest 9/2023 and CT Angio heart 1/19/24 lower lung views where area in left lung with possible atelectasis versus consolidation has shown no progression but little concerning considering his symptoms. Therefore will recommend obtaining repeat chest imaging with Non-contrast CT scan for full lung view (CT chest) and better inform latent/TB treatment.  2.   Will address vaccination later     Thank you for involving me in the care of this patient. Please do not hesitate to contact me with any questions.     Assessment:    Mary Lopez is a 53 year old with a history of decompensated alcoholic liver cirrhosis c/b varices, HE, Portal HTN , HTN, HLD, DM2, Pulm HTN who presents to the clinic for Transplant ID evaluation.    Overall does not have significant TB risk factors or overt exposures apart from brief incarceration at a long-term and a period of 18 months of homelessness where he camped in a field. No travel outside the country as well. Has had two  "indeterminate Quant Gold tests due to anergy in the context of liver cirrhosis. Does report a chronic cough for past few years that has worsened in the last 6 months though unclear if cough versus pt \"clearing his throat\", this cough is non-productive, non-bloody. Other associated symptoms include chills at night though again unclear given current climate and weight loss which pt attributes to undulating weight due to fluid retention from his cirrhosis. He is to be listed on the transplant list given his rising MELD score, HypoNa, and elevated Bilirubin. Reviewed imaging, CT Chest 9/2023 and CT Angio heart 1/19/24 lower lung views where area in left lung with possible atelectasis versus consolidation has shown no progression but little concerning considering his symptoms. Therefore will recommend obtaining repeat chest imaging with Non-contrast CT scan for full lung view and better inform latent/TB treatment.    The patient was discussed with attending physician, Dr. Jacobs, who agrees with the plan outlined above.    Gustavo Hwang MD  Internal Medicine Resident, PGY-2  Pager: [phone]     -------------------------------------------------------------------------------------------------------------------   Reason for consult / Chief complaint:   Consulted by Dr. Hugo Childs for Indeterminate Quant Gold x2      History of presenting illness:  Mary Lopez is a 53 year old with a history of decompensated alcoholic liver cirrhosis c/b varices, HE, Portal HTN , HTN, HLD, DM2, Pulm HTN who presents to the clinic for Transplant ID evaluation.    He states that he is overall \"Doing fine\" though notes that muscle soreness has worsened over the last 3-4 days. Lower extremity swelling has improved over the past days which is good.     On ROS stating that he has had a chronic cough for a long time with slight worsening for the past 6 months. Wakes him up at night on occasion. Partner and pt " "unsure if just from clearing his throat. Non-productive. No hemoptysis. No fevers but does endorse \"full body chills\" (will wear three blankets and puts on a heating pad, unclear if just temperature dysregulation). This has too worsened in last two weeks (though context of recent polar vortex). No diarrhea or stooling beyond what's normal with his lactulose. No chest pain. No SOB. No dyspnea. Some weight loss though this fluctuation with his volume status.     In terms of TB exposures, he recalls around 30 years ago in his twenties staying very briefly in skilled nursing (not more than a month). Homelessness in his teens for 18 months where he camped on a hill alone, however, other's would visit him. No travel outside the [place]. No sick contacts with pulmonary issues. Two cats (indoor, rarely go outside), couple lizards, and fish. In terms of social history he was born in [place] and raised in [place]. Obtained all vaccines in [place]. Had well water exposure occasionally. No contact with farm animals. No hunting. Would like to fish again, hasn't for a few years.     Other infectious history includes sepsis in June of 2023 from a groin Cyst, hospitalized a few days. This was when he was diagnosed with liver cirrhosis.       Social Hx:  Social History     Tobacco Use    Smoking status: Former     Types: Cigarettes     Passive exposure: Never    Smokeless tobacco: Current     Types: Chew     Last attempt to quit: 1/1/2004    Tobacco comments:     Chew daily 1/3 of a tin per day   Vaping Use    Vaping Use: Never used   Substance Use Topics    Alcohol use: Not Currently     Alcohol/week: 12.0 standard drinks of alcohol     Types: 12 Standard drinks or equivalent per week     Comment: Sober since 6/25/2023    Drug use: Not Currently     Smoked cigarettes a long time ago.     Immunizations:  Immunization History   Administered Date(s) Administered    COVID-19 12+ (2023-24) (Pfizer) 12/22/2023    COVID-19 MONOVALENT 12+ (Pfizer) " 04/02/2021, 04/23/2021, 12/18/2021    Hepatitis B, Adult 08/23/2018    Influenza Vaccine >6 months,quad, PF 11/14/2021, 12/22/2023    Pneumococcal 20 valent Conjugate (Prevnar 20) 12/22/2023    TD,PF 7+ (Tenivac) 05/21/2005, 09/26/2017    TDAP Vaccine (Boostrix) 05/21/2005    Zoster recombinant adjuvanted (SHINGRIX) 09/16/2022, 11/19/2022       Allergies:   Allergies   Allergen Reactions    Food Anaphylaxis     baked beans    Fish Allergy     Pineapple Itching    Tilactase      Lactose intolerant        Medications:  Current Outpatient Medications   Medication    bisacodyl (DULCOLAX) 5 MG EC tablet    blood glucose (NO BRAND SPECIFIED) test strip    blood glucose monitoring (NO BRAND SPECIFIED) meter device kit    Continuous Blood Gluc Sensor (DEXCOM G7 SENSOR) MISC    cyclobenzaprine (FLEXERIL) 10 MG tablet    doxepin (SINEQUAN) 10 MG capsule    insulin degludec (TRESIBA FLEXTOUCH) 100 UNIT/ML pen    Insulin Lispro (HUMALOG KWIKPEN) 200 UNIT/ML soln    insulin pen needle (BD PEN NEEDLE SHERRIE 2ND GEN) 32G X 4 MM miscellaneous    lactulose (CHRONULAC) 10 GM/15ML solution    melatonin 10 MG TABS tablet    metFORMIN (GLUCOPHAGE XR) 500 MG 24 hr tablet    multivitamin w/minerals (THERA-VIT-M) tablet    multivitamin, therapeutic (THERA-VIT) TABS tablet    omeprazole (PRILOSEC) 20 MG DR capsule    polyethylene glycol (GOLYTELY) 236 g suspension    potassium chloride ER (KLOR-CON M) 10 MEQ CR tablet    rifaximin (XIFAXAN) 550 MG TABS tablet    spironolactone (ALDACTONE) 25 MG tablet    thin (NO BRAND SPECIFIED) lancets    torsemide (DEMADEX) 10 MG tablet    zinc oxide (DESITIN) 20 % external ointment     No current facility-administered medications for this visit.       Past Medical Hx:  Past Medical History:   Diagnosis Date    ANGEL (acute kidney injury) (H24)     Alcoholic cirrhosis of liver without ascites (H) 07/13/2023    Alcoholic hepatitis with ascites (H28) 10/03/2023    Alcoholic hepatitis without ascites (H28)  07/13/2023    Closed fracture of one rib of left side 09/14/2023    Concussion without loss of consciousness 03/11/2020    Decompensated hepatic cirrhosis (H) 09/15/2023    Diabetes mellitus, type 2 (H) 11/22/2023    Essential hypertension 03/11/2020    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)     Mild hyperlipidemia 12/07/2021    Persistent insomnia 07/13/2023    Portal hypertension (H) 07/13/2023    Scrotal abscess     Secondary esophageal 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 (H) 07/13/2023    Tobacco abuse disorder 03/11/2020    Type 2 diabetes mellitus with hyperglycemia (H) 12/22/2023       Examination:  Vital signs:   /69   Pulse 89   Temp 98.5  F (36.9  C) (Oral)   Wt 101.2 kg (223 lb)   SpO2 97%   BMI 33.80 kg/m      Constitutional: Patient in no distress  Eyes: PERRL, scleral icterus  Neck: no lymphadenopathy  CVS: no added sounds, prominent systolic ejection murmur  RS: clear to auscultation bilaterally, no wheezing, no rhonchi  Abdomen: soft, BS+, distended  Skin: Jaundiced  Extremities: 2+ pedal edema notable at the ankle  Psych: Alert and oriented x 3    Laboratory:  Hematology:  Recent Labs   Lab Test 01/25/24  1554 01/18/24  0807 01/09/24  1622   WBC 7.5 10.3 8.3   RBC 2.24* 2.24* 2.39*   HGB 7.9* 8.5* 8.7*   HCT 24.0* 23.3* 25.1*   * 104* 105*   MCH 35.3* 37.9* 36.4*   MCHC 32.9 36.5 34.7   RDW 14.7 16.6* 15.9*   PLT 71* 83* 84*       Chemistry:  Recent Labs   Lab Test 01/25/24  1554 01/18/24  0807 01/09/24  1622   * 131* 133*   POTASSIUM 4.1 2.8* 3.3*   CHLORIDE 93* 94* 93*   CO2 29 30* 31*   ANIONGAP 5* 7 9   * 176* 182*   BUN 8.4 9.9 11.6   CR 0.94 0.70 0.72   MEG 8.2* 8.2* 9.0       Liver Function Studies:     Recent Labs   Lab Test 01/25/24  1554 01/18/24  0807 01/09/24  1622 01/02/24  1147 12/19/23  0758 11/28/23  1013 09/26/23  1701   PROTTOTAL  --   --   --   --  6.7 7.6 6.2*   ALBUMIN 2.4* 2.4* 2.7*   < > 2.4* 2.7* 2.2*   BILITOTAL 9.4* 8.3* 9.8*   < > 9.8*  7.5* 9.3*   ALKPHOS  --   --   --   --  191* 376* 216*   AST  --   --   --   --  119* 81* 73*   ALT  --   --   --   --  40 42 39    < > = values in this interval not displayed.       Immune Globulin Studies:  Recent Labs   Lab Test 11/28/23  1013 09/13/23  1028   IGG 2,499*  2,499* 2,966*   IGA  --  861*   IGG1 1,523*  --    IGG2 734*  --    IGG3 199*  --    IGG4 131*  --        Microbiology:            Component  Ref Range & Units 2 wk ago 1 mo ago    Quantiferon-TB Gold Plus  Negative Indeterminate Abnormal  Indeterminate Abnormal  CM   Comment: Unable to evaluate interferon gamma response due to a low  mitogen value, which may be the result of insufficient lymphocytes, reduced  lymphocyte activity due to improper specimen handling, or inability of the  patient's lymphocytes to generate interferon gamma.    TB1 Ag minus Nil Value  IU/mL 0.00 0.00    TB2 Ag minus Nil Value  IU/mL 0.00 0.00    Mitogen minus Nil Result  IU/mL 0.29 0.13    Nil Result  IU/mL 0.05 0.08          Imaging:    Narrative & Impression   CT chest without contrast 9/2023     Indication left-sided chest wall pain     COMPARISON: No prior CT scans. Most recent available plain films on  PACS 2/1/2006     FINDINGS: No contrast. The included thyroid appears unremarkable.  Nonenlarged mediastinal lymph nodes are present. Breast tissues show  symmetric appearing mild gynecomastia bilaterally. No enlarged  axillary, mediastinal or hilar lymph nodes.  Small right pleural effusion. Extensive perihepatic and mild  perisplenic ascites. Probable varices in the left upper abdomen  adjacent to what is likely a normal appearing adrenal gland although  this is somewhat indistinct. Mesenteric edema likely related to the  ascites. Cholecystectomy.  No pericardial effusion. Heart upper normal size. Trace left pleural  effusion as well. Main pulmonary artery upper normal size  approximately 3.2 cm. Thoracic aorta normal size. Possible epidermal  inclusion cyst  laterally at the left axilla (series 2 image 16) versus  axillary lymph node, density measurement is approximately +26  Hounsfield units. Again is incompletely imaged but measures  approximately 2.2 x 1.3 cm.  Anatomical variant bovine aortic arch with common origin of the right  brachiocephalic and left common carotid arteries off the aortic arch.     Bone detail shows no suspicious sclerotic or lytic/destructive lesion.  No advanced degenerative or erosive changes. Bones are mildly  osteopenic.      Detail of the lungs shows consolidation with air bronchograms in the  superior right middle lobe posteriorly as well as some consolidation  with air bronchograms in the right lower lobe. Differential would be  infectious versus atelectatic opacity. Other peripheral reticular  opacities noted in the lingula with some mild  consolidative/atelectatic opacities in the lateral left lower lobe as  well. No discrete pulmonary nodule. Major central airways are nicely  patent.                                                                      IMPRESSION: No obviously acute-appearing or other suspicious-appearing  left chest wall findings account for pain. Bilateral small pleural  effusions and areas of atelectasis/consolidation in the right middle  lobe, right lower lobe, lingula and left lower lobe. Please correlate  for infection clinically. Moderate upper abdominal ascites. Apparent  varices in the left upper abdomen which may be related to chronic  liver disease. Cholecystectomy status.     SOULEYMANE CALL MD        CT ABDOMEN PELVIS W/O & W CONTRAST 6/2023   Order: [id number]  Impression    IMPRESSION:  1. Findings of chronic liver disease including a cirrhotic appearing liver with evidence of portal hypertension including splenomegaly and large gastric varices.  2. No acute intra-abdominal findings. No hydronephrosis.      REPORT SIGNED BY DR. Donato Alas    CT ABDOMEN & PELVIS W/O ORAL W IV  CON    DATE: 6/26/2023 11:01 PM    COMPARISON: 7/16/2012.    CLINICAL DATA: Other-Document in comments below.    ADDITIONAL CLINICAL DATA: N49.2 Inflammatory disorders of scrotum N17.9 Acute kidney failure, unspecified D64.9 Anemia, unspecified    TECHNIQUE: Thin-section contiguous transaxial images were obtained through the abdomen and pelvis with intravenous contrast.  No oral contrast was given.  Coronal reformations were also obtained through the abdomen and pelvis.  A total of 100 cc of Omnipaque 350 were administered intravenously for this study.    FINDINGS: Cirrhotic configuration of the liver. No focal liver lesions are identified on this single phase examination. Gallbladder is absent. Splenomegaly. Numerous portosystemic varices are present, including large gastric varices. Large splenorenal shunt. No cystic or solid renal lesions are identified. No hydronephrosis on either side. No ventral or inguinal hernias are identified.      Janeth Jacobs MD

## 2024-01-26 NOTE — PROGRESS NOTES
Virtual Visit Details    Type of service:  Video Visit     Originating Location (pt. Location): Home    Distant Location (provider location):  Off-site  Platform used for Video Visit: Yenifre

## 2024-01-26 NOTE — PROGRESS NOTES
Diabetes Self-Management Education & Support    Mary Lopez presents today for education related to Type 2 diabetes    Patient is being treated with:  insulin  He is accompanied by spouse    Year of diagnosis: 2023  Referring provider:  Lai  Patient is followed by Suzanne THOMPSON  Living Situation: with spouse   Employment: n/a    PATIENT CONCERNS RELATED TO DIABETES SELF MANAGEMENT: Glucose has improved since discharge from hospital  Adina is not giving patient his meal coverage more consistently      ASSESSMENT:    Taking Medication:     Current Diabetes Management per Patient:  Taking diabetes medications? yes:     Diabetes Medication(s)       Biguanides       metFORMIN (GLUCOPHAGE XR) 500 MG 24 hr tablet Take 1 tablet (500 mg) by mouth daily (with dinner)       Insulin       insulin degludec (TRESIBA FLEXTOUCH) 100 UNIT/ML pen Inject 40-50 Units Subcutaneous daily Inject 40 units daily.  Increase by 1 unit daily until fasting BG most often <150 as long as this does not lead to overnight or early morning low BG <70.     Insulin Lispro (HUMALOG KWIKPEN) 200 UNIT/ML soln Inject 10-18 Units Subcutaneous 4 times daily (with meals and nightly) Give 10 units plus sliding scale to correct any premeal blood sugars >175.          Taking 46 units of Tresiba now at night.   Humalog 12 dose plus correction scale 1/35/175/210    Monitoring                          Great improvement in the past week  Patient is still eating in the middle of the night, this is unlikely to change.   [facility] gives patient his shots, it is probably not realistic for him to get humalog when he eats in the middle of the night.   Low last night was related to them using the daytime correction scale on accident.         Patient's most recent   Lab Results   Component Value Date    A1C 8.1 10/31/2023        Healthy Eating:   encouraged consistent carb diet    Problem Solving:      Patient is at risk of hypoglycemia?: YES  Hospitalizations  for hyper or hypoglycemia: No      EDUCATION and INSTRUCTION PROVIDED AT THIS VISIT:    Reviewed dexcom reports.  Great improvement to time in range this past week. Will leave insulin doses as is for now.     Patient-stated goal written and given to Mary Lopez.  Verbalized and demonstrated understanding of instructions.     PLAN:  No changes to current plan    FOLLOW-UP:    Via video in two weeks    Time spent with patient at today's visit was 34 minutes.      Any diabetes medication dose changes were made via the [facility] Protocol and Collaborative Practice Agreement [facility].  A copy of this encounter was provided to patient's referring provider.

## 2024-01-26 NOTE — PROGRESS NOTES
Transplant Infectious Disease Consultation note  Today's Date: 01/26/2024    Addendum 2/16/24:   CT chest not concerning for active or latent TB disease. Ok from an ID standpoint to proceed with transplant.     Janeth Jacobs MD     Staff Attestation    I saw Mary Lopez in the ID clinic on January 26, 2024, with the resident Dr. Hwang. I have examined the patient and reviewed the history, vital signs, medications, labs and imaging. Assessment and plan were jointly made. I agree with and have edited the note and plan of care.     Janeth Jacobs   , [facility]  Pager - [phone]    Recommendations:    Indeterminate Quant Gold test x2 due to anergy/immune dysregulation likely secondary to liver cirrhosis.Reviewed imaging, CT Chest 9/2023 and CT Angio heart 1/19/24 lower lung views where area in left lung with possible atelectasis versus consolidation has shown no progression but little concerning considering his symptoms. Therefore will recommend obtaining repeat chest imaging with Non-contrast CT scan for full lung view (CT chest) and better inform latent/TB treatment.  2.   Will address vaccination later     Thank you for involving me in the care of this patient. Please do not hesitate to contact me with any questions.     Assessment:    Mary Lopez is a 53 year old with a history of decompensated alcoholic liver cirrhosis c/b varices, HE, Portal HTN , HTN, HLD, DM2, Pulm HTN who presents to the clinic for Transplant ID evaluation.    Overall does not have significant TB risk factors or overt exposures apart from brief incarceration at a penitentiary and a period of 18 months of homelessness where he camped in a field. No travel outside the country as well. Has had two indeterminate Quant Gold tests due to anergy in the context of liver cirrhosis. Does report a chronic cough for past few years that has worsened in the last 6 months though unclear if  "cough versus pt \"clearing his throat\", this cough is non-productive, non-bloody. Other associated symptoms include chills at night though again unclear given current climate and weight loss which pt attributes to undulating weight due to fluid retention from his cirrhosis. He is to be listed on the transplant list given his rising MELD score, HypoNa, and elevated Bilirubin. Reviewed imaging, CT Chest 9/2023 and CT Angio heart 1/19/24 lower lung views where area in left lung with possible atelectasis versus consolidation has shown no progression but little concerning considering his symptoms. Therefore will recommend obtaining repeat chest imaging with Non-contrast CT scan for full lung view and better inform latent/TB treatment.    The patient was discussed with attending physician, Dr. Jacobs, who agrees with the plan outlined above.    Gustavo Hwang MD  Internal Medicine Resident, PGY-2  Pager: [phone]     -------------------------------------------------------------------------------------------------------------------   Reason for consult / Chief complaint:   Consulted by Dr. Hugo Childs for Indeterminate Quant Gold x2      History of presenting illness:  Mary Lopez is a 53 year old with a history of decompensated alcoholic liver cirrhosis c/b varices, HE, Portal HTN , HTN, HLD, DM2, Pulm HTN who presents to the clinic for Transplant ID evaluation.    He states that he is overall \"Doing fine\" though notes that muscle soreness has worsened over the last 3-4 days. Lower extremity swelling has improved over the past days which is good.     On ROS stating that he has had a chronic cough for a long time with slight worsening for the past 6 months. Wakes him up at night on occasion. Partner and pt unsure if just from clearing his throat. Non-productive. No hemoptysis. No fevers but does endorse \"full body chills\" (will wear three blankets and puts on a heating pad, unclear if just " temperature dysregulation). This has too worsened in last two weeks (though context of recent polar vortex). No diarrhea or stooling beyond what's normal with his lactulose. No chest pain. No SOB. No dyspnea. Some weight loss though this fluctuation with his volume status.     In terms of TB exposures, he recalls around 30 years ago in his twenties staying very briefly in group home (not more than a month). Homelessness in his teens for 18 months where he camped on a hill alone, however, other's would visit him. No travel outside the [place]. No sick contacts with pulmonary issues. Two cats (indoor, rarely go outside), couple lizards, and fish. In terms of social history he was born in [place] and raised in [place]. Obtained all vaccines in [place]. Had well water exposure occasionally. No contact with farm animals. No hunting. Would like to fish again, hasn't for a few years.     Other infectious history includes sepsis in June of 2023 from a groin Cyst, hospitalized a few days. This was when he was diagnosed with liver cirrhosis.       Social Hx:  Social History     Tobacco Use    Smoking status: Former     Types: Cigarettes     Passive exposure: Never    Smokeless tobacco: Current     Types: Chew     Last attempt to quit: 1/1/2004    Tobacco comments:     Chew daily 1/3 of a tin per day   Vaping Use    Vaping Use: Never used   Substance Use Topics    Alcohol use: Not Currently     Alcohol/week: 12.0 standard drinks of alcohol     Types: 12 Standard drinks or equivalent per week     Comment: Sober since 6/25/2023    Drug use: Not Currently     Smoked cigarettes a long time ago.     Immunizations:  Immunization History   Administered Date(s) Administered    COVID-19 12+ (2023-24) (Pfizer) 12/22/2023    COVID-19 MONOVALENT 12+ (Pfizer) 04/02/2021, 04/23/2021, 12/18/2021    Hepatitis B, Adult 08/23/2018    Influenza Vaccine >6 months,quad, PF 11/14/2021, 12/22/2023    Pneumococcal 20 valent Conjugate (Prevnar 20)  12/22/2023    TD,PF 7+ (Tenivac) 05/21/2005, 09/26/2017    TDAP Vaccine (Boostrix) 05/21/2005    Zoster recombinant adjuvanted (SHINGRIX) 09/16/2022, 11/19/2022       Allergies:   Allergies   Allergen Reactions    Food Anaphylaxis     baked beans    Fish Allergy     Pineapple Itching    Tilactase      Lactose intolerant        Medications:  Current Outpatient Medications   Medication    bisacodyl (DULCOLAX) 5 MG EC tablet    blood glucose (NO BRAND SPECIFIED) test strip    blood glucose monitoring (NO BRAND SPECIFIED) meter device kit    Continuous Blood Gluc Sensor (DEXCOM G7 SENSOR) MISC    cyclobenzaprine (FLEXERIL) 10 MG tablet    doxepin (SINEQUAN) 10 MG capsule    insulin degludec (TRESIBA FLEXTOUCH) 100 UNIT/ML pen    Insulin Lispro (HUMALOG KWIKPEN) 200 UNIT/ML soln    insulin pen needle (BD PEN NEEDLE SHERRIE 2ND GEN) 32G X 4 MM miscellaneous    lactulose (CHRONULAC) 10 GM/15ML solution    melatonin 10 MG TABS tablet    metFORMIN (GLUCOPHAGE XR) 500 MG 24 hr tablet    multivitamin w/minerals (THERA-VIT-M) tablet    multivitamin, therapeutic (THERA-VIT) TABS tablet    omeprazole (PRILOSEC) 20 MG DR capsule    polyethylene glycol (GOLYTELY) 236 g suspension    potassium chloride ER (KLOR-CON M) 10 MEQ CR tablet    rifaximin (XIFAXAN) 550 MG TABS tablet    spironolactone (ALDACTONE) 25 MG tablet    thin (NO BRAND SPECIFIED) lancets    torsemide (DEMADEX) 10 MG tablet    zinc oxide (DESITIN) 20 % external ointment     No current facility-administered medications for this visit.       Past Medical Hx:  Past Medical History:   Diagnosis Date    ANGEL (acute kidney injury) (H24)     Alcoholic cirrhosis of liver without ascites (H) 07/13/2023    Alcoholic hepatitis with ascites (H28) 10/03/2023    Alcoholic hepatitis without ascites (H28) 07/13/2023    Closed fracture of one rib of left side 09/14/2023    Concussion without loss of consciousness 03/11/2020    Decompensated hepatic cirrhosis (H) 09/15/2023    Diabetes  mellitus, type 2 (H) 11/22/2023    Essential hypertension 03/11/2020    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)     Mild hyperlipidemia 12/07/2021    Persistent insomnia 07/13/2023    Portal hypertension (H) 07/13/2023    Scrotal abscess     Secondary esophageal 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 (H) 07/13/2023    Tobacco abuse disorder 03/11/2020    Type 2 diabetes mellitus with hyperglycemia (H) 12/22/2023       Examination:  Vital signs:   /69   Pulse 89   Temp 98.5  F (36.9  C) (Oral)   Wt 101.2 kg (223 lb)   SpO2 97%   BMI 33.80 kg/m      Constitutional: Patient in no distress  Eyes: PERRL, scleral icterus  Neck: no lymphadenopathy  CVS: no added sounds, prominent systolic ejection murmur  RS: clear to auscultation bilaterally, no wheezing, no rhonchi  Abdomen: soft, BS+, distended  Skin: Jaundiced  Extremities: 2+ pedal edema notable at the ankle  Psych: Alert and oriented x 3    Laboratory:  Hematology:  Recent Labs   Lab Test 01/25/24  1554 01/18/24  0807 01/09/24  1622   WBC 7.5 10.3 8.3   RBC 2.24* 2.24* 2.39*   HGB 7.9* 8.5* 8.7*   HCT 24.0* 23.3* 25.1*   * 104* 105*   MCH 35.3* 37.9* 36.4*   MCHC 32.9 36.5 34.7   RDW 14.7 16.6* 15.9*   PLT 71* 83* 84*       Chemistry:  Recent Labs   Lab Test 01/25/24  1554 01/18/24  0807 01/09/24  1622   * 131* 133*   POTASSIUM 4.1 2.8* 3.3*   CHLORIDE 93* 94* 93*   CO2 29 30* 31*   ANIONGAP 5* 7 9   * 176* 182*   BUN 8.4 9.9 11.6   CR 0.94 0.70 0.72   MEG 8.2* 8.2* 9.0       Liver Function Studies:     Recent Labs   Lab Test 01/25/24  1554 01/18/24  0807 01/09/24  1622 01/02/24  1147 12/19/23  0758 11/28/23  1013 09/26/23  1701   PROTTOTAL  --   --   --   --  6.7 7.6 6.2*   ALBUMIN 2.4* 2.4* 2.7*   < > 2.4* 2.7* 2.2*   BILITOTAL 9.4* 8.3* 9.8*   < > 9.8* 7.5* 9.3*   ALKPHOS  --   --   --   --  191* 376* 216*   AST  --   --   --   --  119* 81* 73*   ALT  --   --   --   --  40 42 39    < > = values in this interval not displayed.        Immune Globulin Studies:  Recent Labs   Lab Test 11/28/23  1013 09/13/23  1028   IGG 2,499*  2,499* 2,966*   IGA  --  861*   IGG1 1,523*  --    IGG2 734*  --    IGG3 199*  --    IGG4 131*  --        Microbiology:            Component  Ref Range & Units 2 wk ago 1 mo ago    Quantiferon-TB Gold Plus  Negative Indeterminate Abnormal  Indeterminate Abnormal  CM   Comment: Unable to evaluate interferon gamma response due to a low  mitogen value, which may be the result of insufficient lymphocytes, reduced  lymphocyte activity due to improper specimen handling, or inability of the  patient's lymphocytes to generate interferon gamma.    TB1 Ag minus Nil Value  IU/mL 0.00 0.00    TB2 Ag minus Nil Value  IU/mL 0.00 0.00    Mitogen minus Nil Result  IU/mL 0.29 0.13    Nil Result  IU/mL 0.05 0.08          Imaging:    Narrative & Impression   CT chest without contrast 9/2023     Indication left-sided chest wall pain     COMPARISON: No prior CT scans. Most recent available plain films on  PACS 2/1/2006     FINDINGS: No contrast. The included thyroid appears unremarkable.  Nonenlarged mediastinal lymph nodes are present. Breast tissues show  symmetric appearing mild gynecomastia bilaterally. No enlarged  axillary, mediastinal or hilar lymph nodes.  Small right pleural effusion. Extensive perihepatic and mild  perisplenic ascites. Probable varices in the left upper abdomen  adjacent to what is likely a normal appearing adrenal gland although  this is somewhat indistinct. Mesenteric edema likely related to the  ascites. Cholecystectomy.  No pericardial effusion. Heart upper normal size. Trace left pleural  effusion as well. Main pulmonary artery upper normal size  approximately 3.2 cm. Thoracic aorta normal size. Possible epidermal  inclusion cyst laterally at the left axilla (series 2 image 16) versus  axillary lymph node, density measurement is approximately +26  Hounsfield units. Again is incompletely imaged but  measures  approximately 2.2 x 1.3 cm.  Anatomical variant bovine aortic arch with common origin of the right  brachiocephalic and left common carotid arteries off the aortic arch.     Bone detail shows no suspicious sclerotic or lytic/destructive lesion.  No advanced degenerative or erosive changes. Bones are mildly  osteopenic.      Detail of the lungs shows consolidation with air bronchograms in the  superior right middle lobe posteriorly as well as some consolidation  with air bronchograms in the right lower lobe. Differential would be  infectious versus atelectatic opacity. Other peripheral reticular  opacities noted in the lingula with some mild  consolidative/atelectatic opacities in the lateral left lower lobe as  well. No discrete pulmonary nodule. Major central airways are nicely  patent.                                                                      IMPRESSION: No obviously acute-appearing or other suspicious-appearing  left chest wall findings account for pain. Bilateral small pleural  effusions and areas of atelectasis/consolidation in the right middle  lobe, right lower lobe, lingula and left lower lobe. Please correlate  for infection clinically. Moderate upper abdominal ascites. Apparent  varices in the left upper abdomen which may be related to chronic  liver disease. Cholecystectomy status.     SOULEYMANE ACLL MD        CT ABDOMEN PELVIS W/O & W CONTRAST 6/2023   Order: [id number]  Impression    IMPRESSION:  1. Findings of chronic liver disease including a cirrhotic appearing liver with evidence of portal hypertension including splenomegaly and large gastric varices.  2. No acute intra-abdominal findings. No hydronephrosis.      REPORT SIGNED BY DR. Donato Lilly  Narrative    CT ABDOMEN & PELVIS W/O ORAL W IV CON    DATE: 6/26/2023 11:01 PM    COMPARISON: 7/16/2012.    CLINICAL DATA: Other-Document in comments below.    ADDITIONAL CLINICAL DATA: N49.2 Inflammatory disorders of scrotum  N17.9 Acute kidney failure, unspecified D64.9 Anemia, unspecified    TECHNIQUE: Thin-section contiguous transaxial images were obtained through the abdomen and pelvis with intravenous contrast.  No oral contrast was given.  Coronal reformations were also obtained through the abdomen and pelvis.  A total of 100 cc of Omnipaque 350 were administered intravenously for this study.    FINDINGS: Cirrhotic configuration of the liver. No focal liver lesions are identified on this single phase examination. Gallbladder is absent. Splenomegaly. Numerous portosystemic varices are present, including large gastric varices. Large splenorenal shunt. No cystic or solid renal lesions are identified. No hydronephrosis on either side. No ventral or inguinal hernias are identified.

## 2024-01-26 NOTE — NURSING NOTE
Is the patient currently in the state of [place]? YES    Visit mode:VIDEO    If the visit is dropped, the patient can be reconnected by: VIDEO VISIT: Text to cell phone:   Telephone Information:   Mobile [phone]       Will anyone else be joining the visit? NO  (If patient encounters technical issues they should call [phone]    How would you like to obtain your AVS? MyChart    Are changes needed to the allergy or medication list? No    Reason for visit: Consult    Shelby Kocher VVF

## 2024-01-30 ENCOUNTER — APPOINTMENT (OUTPATIENT)
Dept: LAB | Facility: CLINIC | Age: 54
End: 2024-01-30
Attending: INTERNAL MEDICINE
Payer: COMMERCIAL

## 2024-01-30 ENCOUNTER — HOSPITAL ENCOUNTER (OUTPATIENT)
Facility: CLINIC | Age: 54
Discharge: HOME | End: 2024-01-30
Admitting: INTERNAL MEDICINE
Payer: COMMERCIAL

## 2024-01-30 ENCOUNTER — APPOINTMENT (OUTPATIENT)
Dept: MEDSURG UNIT | Facility: CLINIC | Age: 54
End: 2024-01-30
Payer: COMMERCIAL

## 2024-01-30 VITALS
SYSTOLIC BLOOD PRESSURE: 108 MMHG | TEMPERATURE: 98.1 F | OXYGEN SATURATION: 96 % | RESPIRATION RATE: 16 BRPM | BODY MASS INDEX: 33.01 KG/M2 | HEART RATE: 92 BPM | DIASTOLIC BLOOD PRESSURE: 61 MMHG | WEIGHT: 217.8 LBS | HEIGHT: 68 IN

## 2024-01-30 DIAGNOSIS — I27.20 PULMONARY HTN (H): ICD-10-CM

## 2024-01-30 LAB
ANION GAP SERPL CALCULATED.3IONS-SCNC: 9 MMOL/L (ref 7–15)
APTT PPP: 47 SECONDS (ref 22–38)
BASOPHILS # BLD AUTO: 0.1 10E3/UL (ref 0–0.2)
BASOPHILS NFR BLD AUTO: 1 %
BUN SERPL-MCNC: 19.5 MG/DL (ref 6–20)
CALCIUM SERPL-MCNC: 8.6 MG/DL (ref 8.6–10)
CHLORIDE SERPL-SCNC: 95 MMOL/L (ref 98–107)
CREAT SERPL-MCNC: 1.27 MG/DL (ref 0.67–1.17)
DEPRECATED HCO3 PLAS-SCNC: 25 MMOL/L (ref 22–29)
EGFRCR SERPLBLD CKD-EPI 2021: 68 ML/MIN/1.73M2
EOSINOPHIL # BLD AUTO: 0.3 10E3/UL (ref 0–0.7)
EOSINOPHIL NFR BLD AUTO: 3 %
ERYTHROCYTE [DISTWIDTH] IN BLOOD BY AUTOMATED COUNT: 14.8 % (ref 10–15)
GLUCOSE BLDC GLUCOMTR-MCNC: 303 MG/DL (ref 70–99)
GLUCOSE SERPL-MCNC: 380 MG/DL (ref 70–99)
HCT VFR BLD AUTO: 24.9 % (ref 40–53)
HGB BLD-MCNC: 7.6 G/DL (ref 13.3–17.7)
HGB BLD-MCNC: 8.1 G/DL (ref 13.3–17.7)
IMM GRANULOCYTES # BLD: 0.1 10E3/UL
IMM GRANULOCYTES NFR BLD: 1 %
INR PPP: 2.72 (ref 0.85–1.15)
LYMPHOCYTES # BLD AUTO: 1 10E3/UL (ref 0.8–5.3)
LYMPHOCYTES NFR BLD AUTO: 11 %
MCH RBC QN AUTO: 34.5 PG (ref 26.5–33)
MCHC RBC AUTO-ENTMCNC: 32.5 G/DL (ref 31.5–36.5)
MCV RBC AUTO: 106 FL (ref 78–100)
MONOCYTES # BLD AUTO: 0.7 10E3/UL (ref 0–1.3)
MONOCYTES NFR BLD AUTO: 8 %
NEUTROPHILS # BLD AUTO: 6.4 10E3/UL (ref 1.6–8.3)
NEUTROPHILS NFR BLD AUTO: 76 %
NRBC # BLD AUTO: 0 10E3/UL
NRBC BLD AUTO-RTO: 0 /100
NT-PROBNP SERPL-MCNC: 167 PG/ML (ref 0–900)
PLATELET # BLD AUTO: 71 10E3/UL (ref 150–450)
POTASSIUM SERPL-SCNC: 5.1 MMOL/L (ref 3.4–5.3)
RBC # BLD AUTO: 2.35 10E6/UL (ref 4.4–5.9)
SODIUM SERPL-SCNC: 129 MMOL/L (ref 135–145)
WBC # BLD AUTO: 8.5 10E3/UL (ref 4–11)

## 2024-01-30 PROCEDURE — 999N000142 HC STATISTIC PROCEDURE PREP ONLY

## 2024-01-30 PROCEDURE — 85018 HEMOGLOBIN: CPT

## 2024-01-30 PROCEDURE — 85730 THROMBOPLASTIN TIME PARTIAL: CPT | Performed by: INTERNAL MEDICINE

## 2024-01-30 PROCEDURE — 80048 BASIC METABOLIC PNL TOTAL CA: CPT | Performed by: INTERNAL MEDICINE

## 2024-01-30 PROCEDURE — 83880 ASSAY OF NATRIURETIC PEPTIDE: CPT | Performed by: INTERNAL MEDICINE

## 2024-01-30 PROCEDURE — 250N000009 HC RX 250: Performed by: INTERNAL MEDICINE

## 2024-01-30 PROCEDURE — 82962 GLUCOSE BLOOD TEST: CPT

## 2024-01-30 PROCEDURE — 999N000132 HC STATISTIC PP CARE STAGE 1

## 2024-01-30 PROCEDURE — 93451 RIGHT HEART CATH: CPT | Performed by: INTERNAL MEDICINE

## 2024-01-30 PROCEDURE — 85610 PROTHROMBIN TIME: CPT | Performed by: INTERNAL MEDICINE

## 2024-01-30 PROCEDURE — 93451 RIGHT HEART CATH: CPT | Mod: 26 | Performed by: INTERNAL MEDICINE

## 2024-01-30 PROCEDURE — 36415 COLL VENOUS BLD VENIPUNCTURE: CPT | Performed by: INTERNAL MEDICINE

## 2024-01-30 PROCEDURE — 85025 COMPLETE CBC W/AUTO DIFF WBC: CPT | Performed by: INTERNAL MEDICINE

## 2024-01-30 PROCEDURE — 272N000001 HC OR GENERAL SUPPLY STERILE: Performed by: INTERNAL MEDICINE

## 2024-01-30 PROCEDURE — C1894 INTRO/SHEATH, NON-LASER: HCPCS | Performed by: INTERNAL MEDICINE

## 2024-01-30 PROCEDURE — C1751 CATH, INF, PER/CENT/MIDLINE: HCPCS | Performed by: INTERNAL MEDICINE

## 2024-01-30 RX ORDER — LIDOCAINE 40 MG/G
CREAM TOPICAL
Status: COMPLETED | OUTPATIENT
Start: 2024-01-30 | End: 2024-01-30

## 2024-01-30 RX ADMIN — LIDOCAINE: 40 CREAM TOPICAL at 14:05

## 2024-01-30 ASSESSMENT — ACTIVITIES OF DAILY LIVING (ADL)
ADLS_ACUITY_SCORE: 35

## 2024-01-30 NOTE — PROGRESS NOTES
INR  2.72 JOSELYN paged regarding INR, okay to proceed with RHC. Consent signed. VSS. Wife with patient.

## 2024-01-30 NOTE — DISCHARGE INSTRUCTIONS
[facility]                        Interventional Cardiology  Discharge Instructions   Post Right Heart Cath       AFTER YOU GO HOME:  DO drink plenty of fluids  DO resume your regular diet and medications unless otherwise instructed by your Primary Physician  Do Not scrub the procedure site vigorously  No lotion or powder to the puncture site for 3 days    CALL YOUR PRIMARY PHYSICIAN IF: You may resume all normal activity.  Monitor neck site for bleeding, swelling, or voice changes. If you notice bleeding or swelling immediately apply pressure to the site and call number below to speak with Cardiology Fellow.  If you experience any changes in your breathing you should call your doctor immediately or come to the closest Emergency Department.  Do not drive yourself.    ADDITIONAL INSTRUCTIONS: Medications: You are to resume all home medications including anticoagulation therapy unless otherwise advised by your primary cardiologist or nurse coordinator.    Follow Up: Per your primary cardiology team    If you have any questions or concerns regarding your procedure site please call [phone] at anytime and ask for Cardiology Fellow on call.  They are available 24 hours a day.  You may also contact the Cardiology Clinic after hours number at [phone].                                                       Telephone Numbers [phone] Monday-Friday 8:00 am to 4:30 pm    [phone] After 4:30 pm Monday-Friday, Weekends & Holidays  Ask for Interventional Cardiologist on call. Someone is on call 24 hours/day   [facility] toll free number [phone] Monday-Friday 8:00 am to 4:30 pm   [facility] Emergency Dept [phone]

## 2024-01-30 NOTE — PROGRESS NOTES
Right internal jugular site soft, dry and intact. Denies any pain. Pt escorted via wheelchair to  station accompanied by wife. All belongings returned to pt.

## 2024-01-30 NOTE — PRE-PROCEDURE
[facility]   Interventional Cardiology        Consenting/Education for Right Heart Catheterization     Patient understands that we would like to perform a right heart catheterization. This procedure will be performed by the interventional cardiologist.    Patient understands during the right heart catheterization a fine tube (catheter) is put into the vein of the groin/neck.  It is carefully passed along until it reaches the heart and then goes up into the blood vessels of the lungs. This is done to measure a variety of pressures in your heart and can tell us how well the heart is filling and emptying, as well as monitor fluid status.     Patient also understands risks and complications of the procedure which include, but are not limited to bruising/swelling around the incision site, infection, bleeding, allergic reaction to local anesthetic.      Patient verbalized understanding of risks and benefits of the right heart catheterization and has elected to proceed with the procedure.       Amanda MCFADDEN, ARIA  [facility] Interventional Cardiology

## 2024-01-30 NOTE — PROGRESS NOTES
Pt arrived back on 2A post RHC. Vitals stable. Denies any pain. Right internal jugular site covered with primapore soft, dry and intact. Gluc 302, pt's wife administered 19units of short acting insulin while RN stepped out of room. Eating turkey sandwich, yogurt and cheese right now. Per pt and wife-his blood sugar usually runs between 200-400 at home. They will continue to monitor through his dexcom.

## 2024-02-05 ENCOUNTER — THERAPY VISIT (OUTPATIENT)
Dept: PHYSICAL THERAPY | Facility: CLINIC | Age: 54
End: 2024-02-05
Payer: COMMERCIAL

## 2024-02-05 DIAGNOSIS — M62.81 GENERALIZED MUSCLE WEAKNESS: Primary | ICD-10-CM

## 2024-02-05 PROCEDURE — 97110 THERAPEUTIC EXERCISES: CPT | Mod: GP | Performed by: PHYSICAL THERAPIST

## 2024-02-06 ENCOUNTER — MYC MEDICAL ADVICE (OUTPATIENT)
Dept: FAMILY MEDICINE | Facility: CLINIC | Age: 54
End: 2024-02-06

## 2024-02-06 ENCOUNTER — ANCILLARY PROCEDURE (OUTPATIENT)
Dept: CT IMAGING | Facility: CLINIC | Age: 54
End: 2024-02-06
Attending: STUDENT IN AN ORGANIZED HEALTH CARE EDUCATION/TRAINING PROGRAM
Payer: COMMERCIAL

## 2024-02-06 ENCOUNTER — TELEPHONE (OUTPATIENT)
Dept: FAMILY MEDICINE | Facility: CLINIC | Age: 54
End: 2024-02-06

## 2024-02-06 ENCOUNTER — LAB (OUTPATIENT)
Dept: LAB | Facility: CLINIC | Age: 54
End: 2024-02-06
Payer: COMMERCIAL

## 2024-02-06 ENCOUNTER — VIRTUAL VISIT (OUTPATIENT)
Dept: CARDIOLOGY | Facility: CLINIC | Age: 54
End: 2024-02-06
Attending: INTERNAL MEDICINE
Payer: COMMERCIAL

## 2024-02-06 ENCOUNTER — TELEPHONE (OUTPATIENT)
Dept: TRANSPLANT | Facility: CLINIC | Age: 54
End: 2024-02-06

## 2024-02-06 VITALS — HEIGHT: 68 IN | BODY MASS INDEX: 32.89 KG/M2 | WEIGHT: 217 LBS

## 2024-02-06 DIAGNOSIS — R54 FRAILTY: ICD-10-CM

## 2024-02-06 DIAGNOSIS — D64.9 ANEMIA DUE TO UNKNOWN MECHANISM: ICD-10-CM

## 2024-02-06 DIAGNOSIS — K70.11 ALCOHOLIC HEPATITIS WITH ASCITES (H): ICD-10-CM

## 2024-02-06 DIAGNOSIS — K70.31 ALCOHOLIC CIRRHOSIS OF LIVER WITH ASCITES (H): ICD-10-CM

## 2024-02-06 DIAGNOSIS — D69.6 THROMBOCYTOPENIA (H): ICD-10-CM

## 2024-02-06 DIAGNOSIS — I27.20 PULMONARY HYPERTENSION (H): Primary | ICD-10-CM

## 2024-02-06 DIAGNOSIS — E87.1 HYPONATREMIA: ICD-10-CM

## 2024-02-06 DIAGNOSIS — D64.9 ANEMIA, UNSPECIFIED TYPE: ICD-10-CM

## 2024-02-06 DIAGNOSIS — M75.101 ROTATOR CUFF SYNDROME OF RIGHT SHOULDER: Primary | ICD-10-CM

## 2024-02-06 DIAGNOSIS — K72.90 DECOMPENSATED HEPATIC CIRRHOSIS (H): Primary | ICD-10-CM

## 2024-02-06 DIAGNOSIS — M62.830 BACK MUSCLE SPASM: ICD-10-CM

## 2024-02-06 DIAGNOSIS — K74.60 DECOMPENSATED HEPATIC CIRRHOSIS (H): Primary | ICD-10-CM

## 2024-02-06 LAB
ALBUMIN SERPL BCG-MCNC: 2.7 G/DL (ref 3.5–5.2)
ANION GAP SERPL CALCULATED.3IONS-SCNC: 8 MMOL/L (ref 7–15)
BASOPHILS # BLD AUTO: 0.1 10E3/UL (ref 0–0.2)
BASOPHILS NFR BLD AUTO: 1 %
BILIRUB SERPL-MCNC: 8.4 MG/DL
BUN SERPL-MCNC: 16.7 MG/DL (ref 6–20)
CALCIUM SERPL-MCNC: 8.8 MG/DL (ref 8.6–10)
CHLORIDE SERPL-SCNC: 96 MMOL/L (ref 98–107)
CREAT SERPL-MCNC: 1.09 MG/DL (ref 0.67–1.17)
DEPRECATED HCO3 PLAS-SCNC: 29 MMOL/L (ref 22–29)
EGFRCR SERPLBLD CKD-EPI 2021: 82 ML/MIN/1.73M2
EOSINOPHIL # BLD AUTO: 0.3 10E3/UL (ref 0–0.7)
EOSINOPHIL NFR BLD AUTO: 3 %
ERYTHROCYTE [DISTWIDTH] IN BLOOD BY AUTOMATED COUNT: 14.1 % (ref 10–15)
GLUCOSE SERPL-MCNC: 142 MG/DL (ref 70–99)
HCT VFR BLD AUTO: 23.1 % (ref 40–53)
HGB BLD-MCNC: 7.7 G/DL (ref 13.3–17.7)
IMM GRANULOCYTES # BLD: 0.1 10E3/UL
IMM GRANULOCYTES NFR BLD: 1 %
INR PPP: 2.58 (ref 0.85–1.15)
LYMPHOCYTES # BLD AUTO: 1.2 10E3/UL (ref 0.8–5.3)
LYMPHOCYTES NFR BLD AUTO: 13 %
MCH RBC QN AUTO: 34.5 PG (ref 26.5–33)
MCHC RBC AUTO-ENTMCNC: 33.3 G/DL (ref 31.5–36.5)
MCV RBC AUTO: 104 FL (ref 78–100)
MONOCYTES # BLD AUTO: 0.8 10E3/UL (ref 0–1.3)
MONOCYTES NFR BLD AUTO: 9 %
NEUTROPHILS # BLD AUTO: 6.6 10E3/UL (ref 1.6–8.3)
NEUTROPHILS NFR BLD AUTO: 73 %
NRBC # BLD AUTO: 0 10E3/UL
NRBC BLD AUTO-RTO: 0 /100
PLATELET # BLD AUTO: 78 10E3/UL (ref 150–450)
POTASSIUM SERPL-SCNC: 4.2 MMOL/L (ref 3.4–5.3)
RBC # BLD AUTO: 2.23 10E6/UL (ref 4.4–5.9)
SODIUM SERPL-SCNC: 133 MMOL/L (ref 135–145)
WBC # BLD AUTO: 9 10E3/UL (ref 4–11)

## 2024-02-06 PROCEDURE — 82040 ASSAY OF SERUM ALBUMIN: CPT

## 2024-02-06 PROCEDURE — 71250 CT THORAX DX C-: CPT | Performed by: RADIOLOGY

## 2024-02-06 PROCEDURE — 80048 BASIC METABOLIC PNL TOTAL CA: CPT

## 2024-02-06 PROCEDURE — 85610 PROTHROMBIN TIME: CPT

## 2024-02-06 PROCEDURE — 36415 COLL VENOUS BLD VENIPUNCTURE: CPT

## 2024-02-06 PROCEDURE — 99215 OFFICE O/P EST HI 40 MIN: CPT | Mod: 95 | Performed by: INTERNAL MEDICINE

## 2024-02-06 PROCEDURE — 82247 BILIRUBIN TOTAL: CPT

## 2024-02-06 PROCEDURE — 85025 COMPLETE CBC W/AUTO DIFF WBC: CPT

## 2024-02-06 RX ORDER — CYCLOBENZAPRINE HCL 10 MG
10 TABLET ORAL 3 TIMES DAILY PRN
Qty: 30 TABLET | Refills: 1 | Status: ON HOLD | OUTPATIENT
Start: 2024-02-06 | End: 2024-03-02

## 2024-02-06 ASSESSMENT — PAIN SCALES - GENERAL: PAINLEVEL: MODERATE PAIN (4)

## 2024-02-06 NOTE — TELEPHONE ENCOUNTER
Yes, Dr. Hoyt has been following his anemia.  This looks to be roughly the same as it has been recently.

## 2024-02-06 NOTE — PROGRESS NOTES
Virtual Visit Details    Type of service:  Video Visit   Start TIme: 11;00 AM  End-Time  Originating Location (pt. Location): Home    Distant Location (provider location):  On-site  Platform used for Video Visit: Yenifer    February 6, 2024    Dear Colleagues,    I had the pleasure of seeing your patient Mary Lopez at the [facility] Pulmonary Hypertension clinic.  As you know, Mary is a 53 year old with history of alcoholic cirrhosis, hypertension, type 2 diabetes, and concern for pulmonary hypertension who was referred for pre-operative evaluation of potential liver transplant. Mary states that he is not having significant dyspnea when walking over a block or going up a flight of stairs. He denies any chest pain or pressure with that. He does have some lower extremity edema for which he is on torsemide and it is slowly getting better. Overall, I would classify him as WHO FC 2.    Mary was recently diagnosed with alcoholic cirrhosis. Mary has not had a lot of stigma of chronic cirrhosis including GI bleed or recurrent paracentesis yet. Mary is on an expedited liver transplant evaluation due to his very high MELD score. Mary got a RHC and it showed no signs of pulmonary hypertension.      PAST MEDICAL HISTORY:  Past Medical History:   Diagnosis Date    ANGLE (acute kidney injury) (H24)     Alcoholic cirrhosis of liver without ascites (H) 07/13/2023    Alcoholic hepatitis with ascites (H28) 10/03/2023    Alcoholic hepatitis without ascites (H28) 07/13/2023    Closed fracture of one rib of left side 09/14/2023    Concussion without loss of consciousness 03/11/2020    Decompensated hepatic cirrhosis (H) 09/15/2023    Diabetes mellitus, type 2 (H) 11/22/2023    Essential hypertension 03/11/2020    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)     Mild hyperlipidemia 12/07/2021    Persistent insomnia 07/13/2023    Portal hypertension (H) 07/13/2023    Scrotal abscess     Secondary  esophageal 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 (H) 07/13/2023    Tobacco abuse disorder 03/11/2020    Type 2 diabetes mellitus with hyperglycemia (H) 12/22/2023       FAMILY HISTORY:  Family History   Problem Relation Age of Onset    Anxiety Disorder Mother     Depression Mother     Bipolar Disorder Mother     Chronic Obstructive Pulmonary Disease Mother     Lung Cancer Mother 81    Morbid Obesity Father     Diabetes Father     Diabetes Type 2  Brother     Substance Abuse Maternal Grandfather     Substance Abuse Paternal Grandfather     Colon Cancer No family hx of     Liver Disease No family hx of        SOCIAL HISTORY:  Social History     Socioeconomic History    Marital status:    Occupational History    Occupation: Not working now   Tobacco Use    Smoking status: Never     Passive exposure: Never    Smokeless tobacco: Current     Types: Chew     Last attempt to quit: 1/1/2004    Tobacco comments:     Chew daily 1/3 of a tin per day   Vaping Use    Vaping Use: Never used   Substance and Sexual Activity    Alcohol use: Not Currently     Alcohol/week: 12.0 standard drinks of alcohol     Types: 12 Standard drinks or equivalent per week     Comment: Sober since 6/25/2023    Drug use: Not Currently    Sexual activity: Yes     Partners: Female     Birth control/protection: Male Surgical   Other Topics Concern    Parent/sibling w/ CABG, MI or angioplasty before 65F 55M? No     Social Determinants of Health     Financial Resource Strain: Low Risk  (12/22/2023)    Financial Resource Strain     Within the past 12 months, have you or your family members you live with been unable to get utilities (heat, electricity) when it was really needed?: No   Food Insecurity: Low Risk  (12/22/2023)    Food Insecurity     Within the past 12 months, did you worry that your food would run out before you got money to buy more?: No     Within the past 12 months, did the food you bought just not last and you didn t have money to get more?: No    Transportation Needs: Low Risk  (12/22/2023)    Transportation Needs     Within the past 12 months, has lack of transportation kept you from medical appointments, getting your medicines, non-medical meetings or appointments, work, or from getting things that you need?: No   Interpersonal Safety: Low Risk  (12/22/2023)    Interpersonal Safety     Do you feel physically and emotionally safe where you currently live?: Yes     Within the past 12 months, have you been hit, slapped, kicked or otherwise physically hurt by someone?: No     Within the past 12 months, have you been humiliated or emotionally abused in other ways by your partner or ex-partner?: No   Housing Stability: Low Risk  (12/22/2023)    Housing Stability     Do you have housing? : Yes     Are you worried about losing your housing?: No       CURRENT MEDICATIONS:  Current Outpatient Medications   Medication Sig Dispense Refill    blood glucose (NO BRAND SPECIFIED) test strip Use to test blood sugar two times daily or as directed. To accompany: Blood Glucose Monitor Brands: per insurance. 100 strip 6    blood glucose monitoring (NO BRAND SPECIFIED) meter device kit Use to test blood sugar twice times daily or as directed. Preferred blood glucose meter OR supplies to accompany: Blood Glucose Monitor Brands: per insurance. 1 kit 0    Continuous Blood Gluc Sensor (DEXCOM G7 SENSOR) MISC Change every 10 days. 3 each 5    cyclobenzaprine (FLEXERIL) 10 MG tablet TAKE 1 TABLET(10 MG) BY MOUTH THREE TIMES DAILY AS NEEDED FOR MUSCLE SPASMS 30 tablet 1    doxepin (SINEQUAN) 10 MG capsule TAKE 1 CAPSULE(10 MG) BY MOUTH AT BEDTIME 180 capsule 2    insulin degludec (TRESIBA FLEXTOUCH) 100 UNIT/ML pen Inject 40-50 Units Subcutaneous daily Inject 40 units daily.  Increase by 1 unit daily until fasting BG most often <150 as long as this does not lead to overnight or early morning low BG <70. 60 mL 1    Insulin Lispro (HUMALOG KWIKPEN) 200 UNIT/ML soln Inject 10-18 Units  Subcutaneous 4 times daily (with meals and nightly) Give 10 units plus sliding scale to correct any premeal blood sugars >175. 60 mL 1    insulin pen needle (BD PEN NEEDLE SHERRIE 2ND GEN) 32G X 4 MM miscellaneous USES 5 PER  each 11    lactulose (CHRONULAC) 10 GM/15ML solution TAKE 30 MLS BY MOUTH 2 TIMES DAILY 473 mL 11    melatonin 10 MG TABS tablet Take 1 tablet (10 mg) by mouth nightly as needed for sleep      metFORMIN (GLUCOPHAGE XR) 500 MG 24 hr tablet Take 1 tablet (500 mg) by mouth daily (with dinner) 60 tablet 1    multivitamin w/minerals (THERA-VIT-M) tablet TAKE 1 TABLET BY MOUTH DAILY 90 tablet 1    omeprazole (PRILOSEC) 20 MG DR capsule Take 1 capsule (20 mg) by mouth 2 times daily 180 capsule 1    potassium chloride ER (KLOR-CON M) 10 MEQ CR tablet Take 2 tablets (20 mEq) by mouth 2 times daily 240 tablet 1    rifaximin (XIFAXAN) 550 MG TABS tablet Take 1 tablet (550 mg) by mouth 2 times daily for 180 days 120 tablet 2    spironolactone (ALDACTONE) 25 MG tablet Take 1 tablet (25 mg) by mouth daily 90 tablet 1    thin (NO BRAND SPECIFIED) lancets Use with lanceting device. To accompany: Blood Glucose Monitor Brands: per insurance. 100 each 6    torsemide (DEMADEX) 10 MG tablet Take 3 tablets (30 mg) by mouth daily 270 tablet 0    zinc oxide (DESITIN) 20 % external ointment Apply topically as needed for dry skin or irritation         ROS:   10 point ROS negative except HPI    EXAM:  GENERAL: alert and no distress  EYES: Eyes grossly normal to inspection.  No discharge or erythema, or obvious scleral/conjunctival abnormalities.  RESP: No audible wheeze, cough, or visible cyanosis.    SKIN: Visible skin clear. No significant rash, abnormal pigmentation or lesions.  NEURO: Cranial nerves grossly intact.  Mentation and speech appropriate for age.  PSYCH: Appropriate affect, tone, and pace of words      Labs:  CBC RESULTS:  Lab Results   Component Value Date    WBC 8.5 01/30/2024    WBC 10.8 12/19/2006     RBC 2.35 (L) 01/30/2024    RBC 5.56 12/19/2006    HGB 7.6 (L) 01/30/2024    HGB 8.1 (L) 01/30/2024    HGB 16.9 12/19/2006    HCT 24.9 (L) 01/30/2024    HCT 47.5 12/19/2006     (H) 01/30/2024    MCV 85 12/19/2006    MCH 34.5 (H) 01/30/2024    MCH 30.4 12/19/2006    MCHC 32.5 01/30/2024    MCHC 35.6 12/19/2006    RDW 14.8 01/30/2024    RDW 10.7 12/19/2006    PLT 71 (L) 01/30/2024     12/19/2006       CMP RESULTS:  Lab Results   Component Value Date     (L) 01/30/2024     07/05/2020    POTASSIUM 5.1 01/30/2024    POTASSIUM 3.8 03/12/2022    POTASSIUM 4.2 07/05/2020    CHLORIDE 95 (L) 01/30/2024    CHLORIDE 101 03/12/2022    CHLORIDE 102 07/05/2020    CO2 25 01/30/2024    CO2 25 03/12/2022    CO2 28 07/05/2020    ANIONGAP 9 01/30/2024    ANIONGAP 10 03/12/2022    ANIONGAP 9 07/05/2020     (H) 01/30/2024     (H) 01/30/2024     (H) 03/12/2022     (H) 07/05/2020    BUN 19.5 01/30/2024    BUN 11 03/12/2022    BUN 13 07/05/2020    CR 1.27 (H) 01/30/2024    CR 0.95 07/05/2020    GFRESTIMATED 68 01/30/2024    GFRESTIMATED >90 07/05/2020    GFRESTBLACK >90 07/05/2020    MEG 8.6 01/30/2024    MEG 9.7 07/05/2020    BILITOTAL 9.4 (H) 01/25/2024    BILITOTAL 0.8 12/19/2006    ALBUMIN 2.4 (L) 01/25/2024    ALBUMIN 4.0 09/03/2022    ALBUMIN 5.2 (H) 12/19/2006    ALKPHOS 191 (H) 12/19/2023    ALKPHOS 117 12/19/2006    ALT 40 12/19/2023    ALT 50 12/19/2006     (H) 12/19/2023    AST 52 12/19/2006        Echocardiogram 12/23:  Global and regional left ventricular function is normal with an EF of 60-65%.  Global right ventricular function is normal.  Mild tricuspid and mitral insufficiency present.  The right ventricular systolic pressure is approximated at 37 mmHg (upper limits of normal).  Estimated mean right atrial pressure is normal.  Trivial pericardial effusion is present.  Ascites is noted.    Coronary CTA 1/24  1.  Minimal non-obstructive coronary artery  disease.  2.  Total Agatston score 7 placing the patient in the 60th percentile when compared to age and gender matched control group.  3.  Please review Radiology report for incidental noncardiac findings that will follow separately.    RHC 1/24  Minimally elevated PA pressures (mPA 20 mmHg) with minimally elevated right sided filling pressures.  PVR < 1 DIAMOND  High cardiac output by Naveed and Thermodilution due to cirrhosis.  High output is causing slight elevation in PA pressures but there is no intrinsic pulmonary vascular disease or right heart dysfunction.    Assessment and Plan: Mary Lopez is a 53 year old with alcoholic cirrhosis who presents for pre-operative risk stratification.     Pre-operative risk stratification for liver transplantation: Mary is not a high risk candidate for surgery as CTA showed no evidence of obstructive CAD and hemodynamic study showed normal PA pressures. No further work-up is indicated.    It was a pleasure seeing your patient Mary Lopez at the [facility] Pulmonary Hypertension clinic.  Please contact us with any questions or concerns that you may have.    Sincerely,    Steven Tran MD, PhD, FAHA  Associate Professor of Medicine  Director, Chronic Thromboembolic Pulmonary Hypertension Program  Cardiovascular Division      I spent a total of 40 minutes on the date of service evaluating this patient which included face to face discussion, performing a physical exam, reviewing of the chart to gain information from other providers to obtain further history, personally reviewing echocardiograms, CT scans, pulmonary function tests, invasive pulmonary angiograms, and hemodynamic tracings, ordering tests and/or medications, and documenting clinical information in the electronic health record.

## 2024-02-06 NOTE — PATIENT INSTRUCTIONS
You were seen today in the Pulmonary Hypertension Clinic at the [facility].     Cardiology Provider you saw during your visit:    Dr. Tran    Medication Changes:  None      Follow-up:   - Follow up as needed    Please call us immediately if you have any syncope (fainting or passing out), chest pain, edema (swelling or weight gain), or decline in your functional status (general decline in how you are feeling).    If you have emergent concerns after hours or on the weekend, please call our on-call Cardiologist at [phone], option [phone]. For emergencies call [phone].     Thank you for allowing us to be a part of your care here at the [facility]    If you have questions or concerns please contact us at:    Addis Aldrich RN (P: [phone])    Nurse Coordinator       Pulmonary Hypertension     [facility]         RANDI Sinclair   (Prior Auths & Pt Assistance)   ()  Clinic   Clinic   Pulmonary Hypertension   Pulmonary Hypertension  [facility]  [facility]  (P)[phone]    (P) [phone]  (F) [phone]

## 2024-02-06 NOTE — TELEPHONE ENCOUNTER
Routing to ordering provider Dr. Garcia with update regarding critical hemoglobin value.     Please have your staff reach out to the patient with any updates or instructions.    BRIDGET Vital  [facility] Primary Care Triage

## 2024-02-06 NOTE — TELEPHONE ENCOUNTER
This is ordered not my PCP or anyone in primary care.  Please reach out to ordering provider-patient's oncologist

## 2024-02-06 NOTE — TELEPHONE ENCOUNTER
Svitlana from [facility] lab calling about critical value of hemoglobin of 7.7 g/dL.    Routing to provider to review and advise.    BRIDGET Vital  [facility] Primary Care Triage

## 2024-02-06 NOTE — TELEPHONE ENCOUNTER
Patient had xray of right shoulder completed on 12/22/2023. Routing to provider to advise on PT referral and Flexeril request.    BRIDGET Vital  [facility] Primary Care Triage

## 2024-02-06 NOTE — TELEPHONE ENCOUNTER
Noted provider's response below. No further action at this time unless instructed otherwise.     BRIDGET Vital  [facility] Primary Care Triage

## 2024-02-06 NOTE — NURSING NOTE
Chief Complaint   Patient presents with    Follow Up     [facility] follow up, no updates per pt's wife. Medications/allergies reviewed with pt's wife, no changes per pt.       Is the patient currently in the state of [place]? YES    Visit mode:VIDEO    If the visit is dropped, the patient can be reconnected by: VIDEO VISIT: Send to e-mail at: [email or web address]    Will anyone else be joining the visit? YES: Wife Rosio will be joining visit   (If patient encounters technical issues they should call [phone])    How would you like to obtain your AVS? MyChart    Are changes needed to the allergy or medication list? No, Pt stated no changes to allergies, and Pt stated no med changes    Patient denies any changes since echeck-in regarding medication and allergies and states all information entered during echeck-in remains accurate.    Lesley Cruz, Visit Facilitator/MA.

## 2024-02-06 NOTE — LETTER
2/6/2024      RE: Mary Lopez  [address]  [place] [address]       Dear Colleague,    Thank you for the opportunity to participate in the care of your patient, Mary Lopez, at the [facility] [place] at [facility]. Please see a copy of my visit note below.        February 6, 2024    Dear Colleagues,    I had the pleasure of seeing your patient Mary Lopez at the [facility] Pulmonary Hypertension clinic.  As you know, Mary is a 53 year old with history of alcoholic cirrhosis, hypertension, type 2 diabetes, and concern for pulmonary hypertension who was referred for pre-operative evaluation of potential liver transplant. Mary states that he is not having significant dyspnea when walking over a block or going up a flight of stairs. He denies any chest pain or pressure with that. He does have some lower extremity edema for which he is on torsemide and it is slowly getting better. Overall, I would classify him as WHO FC 2.    Mary was recently diagnosed with alcoholic cirrhosis. Mary has not had a lot of stigma of chronic cirrhosis including GI bleed or recurrent paracentesis yet. Mary is on an expedited liver transplant evaluation due to his very high MELD score. Mary got a RHC and it showed no signs of pulmonary hypertension.      PAST MEDICAL HISTORY:  Past Medical History:   Diagnosis Date    ANGEL (acute kidney injury) (H24)     Alcoholic cirrhosis of liver without ascites (H) 07/13/2023    Alcoholic hepatitis with ascites (H28) 10/03/2023    Alcoholic hepatitis without ascites (H28) 07/13/2023    Closed fracture of one rib of left side 09/14/2023    Concussion without loss of consciousness 03/11/2020    Decompensated hepatic cirrhosis (H) 09/15/2023    Diabetes mellitus, type 2 (H) 11/22/2023    Essential hypertension 03/11/2020    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)     Mild  hyperlipidemia 12/07/2021    Persistent insomnia 07/13/2023    Portal hypertension (H) 07/13/2023    Scrotal abscess     Secondary esophageal 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 (H) 07/13/2023    Tobacco abuse disorder 03/11/2020    Type 2 diabetes mellitus with hyperglycemia (H) 12/22/2023       FAMILY HISTORY:  Family History   Problem Relation Age of Onset    Anxiety Disorder Mother     Depression Mother     Bipolar Disorder Mother     Chronic Obstructive Pulmonary Disease Mother     Lung Cancer Mother 81    Morbid Obesity Father     Diabetes Father     Diabetes Type 2  Brother     Substance Abuse Maternal Grandfather     Substance Abuse Paternal Grandfather     Colon Cancer No family hx of     Liver Disease No family hx of        SOCIAL HISTORY:  Social History     Socioeconomic History    Marital status:    Occupational History    Occupation: Not working now   Tobacco Use    Smoking status: Never     Passive exposure: Never    Smokeless tobacco: Current     Types: Chew     Last attempt to quit: 1/1/2004    Tobacco comments:     Chew daily 1/3 of a tin per day   Vaping Use    Vaping Use: Never used   Substance and Sexual Activity    Alcohol use: Not Currently     Alcohol/week: 12.0 standard drinks of alcohol     Types: 12 Standard drinks or equivalent per week     Comment: Sober since 6/25/2023    Drug use: Not Currently    Sexual activity: Yes     Partners: Female     Birth control/protection: Male Surgical   Other Topics Concern    Parent/sibling w/ CABG, MI or angioplasty before 65F 55M? No     Social Determinants of Health     Financial Resource Strain: Low Risk  (12/22/2023)    Financial Resource Strain     Within the past 12 months, have you or your family members you live with been unable to get utilities (heat, electricity) when it was really needed?: No   Food Insecurity: Low Risk  (12/22/2023)    Food Insecurity     Within the past 12 months, did you worry that your food would run out before you got money  to buy more?: No     Within the past 12 months, did the food you bought just not last and you didn t have money to get more?: No   Transportation Needs: Low Risk  (12/22/2023)    Transportation Needs     Within the past 12 months, has lack of transportation kept you from medical appointments, getting your medicines, non-medical meetings or appointments, work, or from getting things that you need?: No   Interpersonal Safety: Low Risk  (12/22/2023)    Interpersonal Safety     Do you feel physically and emotionally safe where you currently live?: Yes     Within the past 12 months, have you been hit, slapped, kicked or otherwise physically hurt by someone?: No     Within the past 12 months, have you been humiliated or emotionally abused in other ways by your partner or ex-partner?: No   Housing Stability: Low Risk  (12/22/2023)    Housing Stability     Do you have housing? : Yes     Are you worried about losing your housing?: No       CURRENT MEDICATIONS:  Current Outpatient Medications   Medication Sig Dispense Refill    blood glucose (NO BRAND SPECIFIED) test strip Use to test blood sugar two times daily or as directed. To accompany: Blood Glucose Monitor Brands: per insurance. 100 strip 6    blood glucose monitoring (NO BRAND SPECIFIED) meter device kit Use to test blood sugar twice times daily or as directed. Preferred blood glucose meter OR supplies to accompany: Blood Glucose Monitor Brands: per insurance. 1 kit 0    Continuous Blood Gluc Sensor (DEXCOM G7 SENSOR) MISC Change every 10 days. 3 each 5    cyclobenzaprine (FLEXERIL) 10 MG tablet TAKE 1 TABLET(10 MG) BY MOUTH THREE TIMES DAILY AS NEEDED FOR MUSCLE SPASMS 30 tablet 1    doxepin (SINEQUAN) 10 MG capsule TAKE 1 CAPSULE(10 MG) BY MOUTH AT BEDTIME 180 capsule 2    insulin degludec (TRESIBA FLEXTOUCH) 100 UNIT/ML pen Inject 40-50 Units Subcutaneous daily Inject 40 units daily.  Increase by 1 unit daily until fasting BG most often <150 as long as this does  not lead to overnight or early morning low BG <70. 60 mL 1    Insulin Lispro (HUMALOG KWIKPEN) 200 UNIT/ML soln Inject 10-18 Units Subcutaneous 4 times daily (with meals and nightly) Give 10 units plus sliding scale to correct any premeal blood sugars >175. 60 mL 1    insulin pen needle (BD PEN NEEDLE SHERRIE 2ND GEN) 32G X 4 MM miscellaneous USES 5 PER  each 11    lactulose (CHRONULAC) 10 GM/15ML solution TAKE 30 MLS BY MOUTH 2 TIMES DAILY 473 mL 11    melatonin 10 MG TABS tablet Take 1 tablet (10 mg) by mouth nightly as needed for sleep      metFORMIN (GLUCOPHAGE XR) 500 MG 24 hr tablet Take 1 tablet (500 mg) by mouth daily (with dinner) 60 tablet 1    multivitamin w/minerals (THERA-VIT-M) tablet TAKE 1 TABLET BY MOUTH DAILY 90 tablet 1    omeprazole (PRILOSEC) 20 MG DR capsule Take 1 capsule (20 mg) by mouth 2 times daily 180 capsule 1    potassium chloride ER (KLOR-CON M) 10 MEQ CR tablet Take 2 tablets (20 mEq) by mouth 2 times daily 240 tablet 1    rifaximin (XIFAXAN) 550 MG TABS tablet Take 1 tablet (550 mg) by mouth 2 times daily for 180 days 120 tablet 2    spironolactone (ALDACTONE) 25 MG tablet Take 1 tablet (25 mg) by mouth daily 90 tablet 1    thin (NO BRAND SPECIFIED) lancets Use with lanceting device. To accompany: Blood Glucose Monitor Brands: per insurance. 100 each 6    torsemide (DEMADEX) 10 MG tablet Take 3 tablets (30 mg) by mouth daily 270 tablet 0    zinc oxide (DESITIN) 20 % external ointment Apply topically as needed for dry skin or irritation         ROS:   10 point ROS negative except HPI    EXAM:  GENERAL: alert and no distress  EYES: Eyes grossly normal to inspection.  No discharge or erythema, or obvious scleral/conjunctival abnormalities.  RESP: No audible wheeze, cough, or visible cyanosis.    SKIN: Visible skin clear. No significant rash, abnormal pigmentation or lesions.  NEURO: Cranial nerves grossly intact.  Mentation and speech appropriate for age.  PSYCH: Appropriate affect,  tone, and pace of words      Labs:  CBC RESULTS:  Lab Results   Component Value Date    WBC 8.5 01/30/2024    WBC 10.8 12/19/2006    RBC 2.35 (L) 01/30/2024    RBC 5.56 12/19/2006    HGB 7.6 (L) 01/30/2024    HGB 8.1 (L) 01/30/2024    HGB 16.9 12/19/2006    HCT 24.9 (L) 01/30/2024    HCT 47.5 12/19/2006     (H) 01/30/2024    MCV 85 12/19/2006    MCH 34.5 (H) 01/30/2024    MCH 30.4 12/19/2006    MCHC 32.5 01/30/2024    MCHC 35.6 12/19/2006    RDW 14.8 01/30/2024    RDW 10.7 12/19/2006    PLT 71 (L) 01/30/2024     12/19/2006       CMP RESULTS:  Lab Results   Component Value Date     (L) 01/30/2024     07/05/2020    POTASSIUM 5.1 01/30/2024    POTASSIUM 3.8 03/12/2022    POTASSIUM 4.2 07/05/2020    CHLORIDE 95 (L) 01/30/2024    CHLORIDE 101 03/12/2022    CHLORIDE 102 07/05/2020    CO2 25 01/30/2024    CO2 25 03/12/2022    CO2 28 07/05/2020    ANIONGAP 9 01/30/2024    ANIONGAP 10 03/12/2022    ANIONGAP 9 07/05/2020     (H) 01/30/2024     (H) 01/30/2024     (H) 03/12/2022     (H) 07/05/2020    BUN 19.5 01/30/2024    BUN 11 03/12/2022    BUN 13 07/05/2020    CR 1.27 (H) 01/30/2024    CR 0.95 07/05/2020    GFRESTIMATED 68 01/30/2024    GFRESTIMATED >90 07/05/2020    GFRESTBLACK >90 07/05/2020    MEG 8.6 01/30/2024    MEG 9.7 07/05/2020    BILITOTAL 9.4 (H) 01/25/2024    BILITOTAL 0.8 12/19/2006    ALBUMIN 2.4 (L) 01/25/2024    ALBUMIN 4.0 09/03/2022    ALBUMIN 5.2 (H) 12/19/2006    ALKPHOS 191 (H) 12/19/2023    ALKPHOS 117 12/19/2006    ALT 40 12/19/2023    ALT 50 12/19/2006     (H) 12/19/2023    AST 52 12/19/2006        Echocardiogram 12/23:  Global and regional left ventricular function is normal with an EF of 60-65%.  Global right ventricular function is normal.  Mild tricuspid and mitral insufficiency present.  The right ventricular systolic pressure is approximated at 37 mmHg (upper limits of normal).  Estimated mean right atrial pressure is normal.  Trivial  pericardial effusion is present.  Ascites is noted.    Coronary CTA 1/24  1.  Minimal non-obstructive coronary artery disease.  2.  Total Agatston score 7 placing the patient in the 60th percentile when compared to age and gender matched control group.  3.  Please review Radiology report for incidental noncardiac findings that will follow separately.    RHC 1/24  Minimally elevated PA pressures (mPA 20 mmHg) with minimally elevated right sided filling pressures.  PVR < 1 DIAMOND  High cardiac output by Naveed and Thermodilution due to cirrhosis.  High output is causing slight elevation in PA pressures but there is no intrinsic pulmonary vascular disease or right heart dysfunction.    Assessment and Plan: Mary Lopez is a 53 year old with alcoholic cirrhosis who presents for pre-operative risk stratification.     Pre-operative risk stratification for liver transplantation: Mary is not a high risk candidate for surgery as CTA showed no evidence of obstructive CAD and hemodynamic study showed normal PA pressures. No further work-up is indicated.    It was a pleasure seeing your patient Mary Lopez at the [facility] Pulmonary Hypertension clinic.  Please contact us with any questions or concerns that you may have.    Sincerely,    Steven Tran MD, PhD, FAHA  Associate Professor of Medicine  Director, Chronic Thromboembolic Pulmonary Hypertension Program  Cardiovascular Division      I spent a total of 40 minutes on the date of service evaluating this patient which included face to face discussion, performing a physical exam, reviewing of the chart to gain information from other providers to obtain further history, personally reviewing echocardiograms, CT scans, pulmonary function tests, invasive pulmonary angiograms, and hemodynamic tracings, ordering tests and/or medications, and documenting clinical information in the electronic health record.

## 2024-02-06 NOTE — PROGRESS NOTES
Transplant Social Work Services Phone Call      Data: Mary is being evaluated for a liver transplant.  I received a Hookedhart message from Mary's wife Adina with questions about Medicare eligibility.  Intervention: I called Adian back and provided education.  Assessment: Per Adina, Mary has had 8 sessions with [facility] to date.  She also reports Mary has benefited from therapy, and would plan to continue post transplant.  The goal is weekly therapy appointments.      Mary started SSDI payments last month, therefore will not be eligible for Medicare for two years.   Education provided by SW: Medicare eligibility, liver transplant support group  Plan: I will remain available for the psychosocial needs of this patient and family.      CAPRI Brock, [facility]  Liver Transplant   Phone [phone]

## 2024-02-07 NOTE — TELEPHONE ENCOUNTER
Please advice patient NOT to use NSAID such as Ibuprofen for pain due to liver disease and anemia.   Use Tylenol only for pain.  Regarding the shoulder pain, I will place referral for PT as well as sports medicine.

## 2024-02-07 NOTE — TELEPHONE ENCOUNTER
RN called and spoke with patient's wife (CTC on file) to relay provider message below. Rosio verbalized good understanding. She stated they sent a Jada Beautyt message yesterday to Dr. Munoz in oncology regarding his hemoglobin and next steps. No further questions or concerns at this time.     BRIDGET Vital  [facility] Triage

## 2024-02-07 NOTE — TELEPHONE ENCOUNTER
Continue with weekly CBC and consider RBC transfusion if Hemoglobin drops below 7. Please ask patient to setup appointment with oncology/hematology for follow-up regarding anemia. Was seen last year in consultation.

## 2024-02-08 ENCOUNTER — DOCUMENTATION ONLY (OUTPATIENT)
Dept: TRANSPLANT | Facility: CLINIC | Age: 54
End: 2024-02-08

## 2024-02-08 ENCOUNTER — TELEPHONE (OUTPATIENT)
Dept: TRANSPLANT | Facility: CLINIC | Age: 54
End: 2024-02-08

## 2024-02-08 DIAGNOSIS — D64.9 ANEMIA: ICD-10-CM

## 2024-02-08 DIAGNOSIS — K70.31 ALCOHOLIC CIRRHOSIS OF LIVER WITH ASCITES (H): Primary | ICD-10-CM

## 2024-02-08 PROBLEM — K76.82 HEPATIC ENCEPHALOPATHY (H): Status: ACTIVE | Noted: 2024-01-11

## 2024-02-08 NOTE — PROGRESS NOTES
I requested updated records from [facility] (Felisha Leos, [phone], option 5).       CAPRI Brock, [facility]  Liver Transplant   Phone [phone]

## 2024-02-08 NOTE — LETTER
February 8, 2024    Mary Lopez  [address] [place] [address]    Dear Mr. Lopez,    This letter is sent to confirm that you have completed your transplant work-up and you are a candidate in the liver transplant program at the [facility]. You were placed on the liver active waitlist on 2/8/24.      When you are active on the waitlist and an organ becomes available, a coordinator will need to speak to you immediately. You could be contacted at any time during the day or night as an organ could become available at any time. Please make certain our office always has your current telephone numbers and address.      Items we will need from you:    We have received approval from your insurance company for the transplant procedure.  It is critical that you notify us if there is any change in your insurance.  It is also important that you familiarize yourself with the details of your specific insurance policy.  Our patient  is available to assist you if you should have any questions regarding your coverage.    Please plan on getting your labs done weekly starting 2/13/24    During this waiting period, we may request additional periodic laboratory tests with your primary physician.  It will be your responsibility to remind your physician to forward your results to the Transplant Office.    We need to be kept informed of any changes in your medical condition such as:    changes in your medications,   significant changes in your health  significant infections (such as pneumonia or abscesses)  blood transfusions  any condition which requires hospitalization  any surgery    Remember to complete any routine cancer screening tests required before your transplant.  This includes colonoscopy; prostate screening for men, and mammogram and gynecologic testing for women, as well as dental work.  Your primary care clinic can assist you with  arranging for these exams.  Remind your caregivers to forward copies of the records and final reports.    We want you to know that our program has physician and surgeon coverage 24 hours a day, 365 days a year. In addition, our transplant surgeons and physicians will not be on call for two or more transplant programs more than 30 miles apart unless the circumstances have been reviewed and approved by the United Network for Organ Sharing (UNOS) Membership and Professional Standards Committee (MPSC). Finally, our primary physician and primary surgeons are not designated as the primary surgeon or primary physician at more than 1 transplant hospital. If this coverage changes or there are substantial program changes, you will be notified in writing by letter.     Attached is a letter from UN that describes the services and information offered to patients by UNOS and the Organ Procurement and Transplantation Network (OPTN).    We appreciate having had the opportunity to participate in your care.  If you have questions, please feel free to call the Transplant Office at [phone] or [phone].    Sincerely,    Keaton Martinez Jr., EDDAN, RN  Liver Transplant Coordinator  [phone]  Solid Organ Transplant  [facility], [facility]    Enclosures: OS Letter  cc: Care Team                            The Organ Procurement and Transplantation Network Toll-free patient services line:  1-269.613.9461  Your resource for organ transplant information    Staffed 8:30 am - 5:00 pm ET Monday - Friday Leave a message 24/7 to receive a call back    The Organ Procurement and Transplantation Network (OPTN) is the national transplant system. It makes the policies that decide how donated organs are matched to patients waiting for a transplant. The OPTN:    Makes sure donated organs get matched to people on the transplant waiting list  Tells  people about the donation and transplant processes  Makes sure that the public knows about the need for more organ and tissue donations    The OPTN has a free patient services line that you can call to:  Get more information about:  Organ donation and organ transplants  Donation and transplant policies  Get an information kit with:  A list of transplant hospitals  Waiting list information  Talk about any questions you may have about your transplant hospital or organ procurement organization. The staff will do their best to help you or point you to others who may help.  Find out how you can volunteer with the OPTN and help shape transplant policy     The patient services line number is: 3-786-853-2881    Patient services line staff CANNOT answer questions about your own medical care, including:  Waiting list status  Test results  Medical records  You will need to call your transplant hospital for this information.    The following websites have more information about transplantation and donation:    OPTN: https://optn.transplant.hrsa.gov/    For potential living donors and transplant recipients:    Living with transplant: https://www.transplantliving.org/    Living donation process: https://optn.transplant.hrsa.gov/living-donation/    Financial assistance: https://www.livingdonorassistance.org/    Transplantation data: https://www.srtr.org/    Organ donation: https://www.organdonor.gov/    Volunteer with the OPTN: https://optn.transplant.hrsa.gov/get-involved/

## 2024-02-08 NOTE — TELEPHONE ENCOUNTER
Pre-Liver Transplant Evaluation/Teaching    TEACHING TOPICS: Evaluation Process, Evaluation Items, Diagnostic Studies, Consultation, Chemical Dependency Policy, CD Eval, Donor Source, Liver Allocation, MELD System, UNOS, Waiting List, Follow up while on transplant list, Follow up after transplantation, Infection and Rejection, Immunosuppression , Medication Teaching, Lab Recording after transplant, Laboratory Frequency after transplant , Consent for evaluation and One year survival rates    INSTRUCTIONAL MATERIAL USED/GIVEN:   Liver Transplant Handbook, MELD Booklet, Donor Booklet, Web Sites Options, Verbal instructions, Multiple Listing Brochure , Consent for evaluation signed, One year survival rates and SRTR (Scientific Registry) Data    Person(s) involved in teaching: patient and spouse  Asks Questions: YES  Eager to Learn: YES  Cooperative: YES  Receptive (willing/able to accept information): YES  Reason for the appointment, diagnosis and treatment plan: YES  Knowledge of proper use of medications and conditions for which they are ordered (with special attention to potential side effects or drug interactions): YES  Which situations necessitate calling provider and whom to contact: YES    Teaching Concerns Addressed   Comments: Big 5: MD compliance, med compliance, lab compliance, eating right & exercise, no alcohol or non-prescribed meds/drugs before or after transplant.   Nutritional needs and diet plan: YES  How and/when to access community resources: YES  Patient is aware of and agrees to required commitment to post-op care and long term follow-up: YES    Patient open to all Extended Criteria organ offers (underutilized donors): Yes or no: Yes  Details: open for all    Patient has name and phone number of transplant coordinator.   Time Spent face-to-face teachin minutes.    Keaton Martinez Jr., BSN, RN  Liver Transplant Coordinator  [phone]

## 2024-02-09 ENCOUNTER — VIRTUAL VISIT (OUTPATIENT)
Dept: EDUCATION SERVICES | Facility: CLINIC | Age: 54
End: 2024-02-09
Payer: COMMERCIAL

## 2024-02-09 DIAGNOSIS — Z79.4 TYPE 2 DIABETES MELLITUS WITH HYPERGLYCEMIA, WITH LONG-TERM CURRENT USE OF INSULIN (H): Primary | ICD-10-CM

## 2024-02-09 DIAGNOSIS — D64.9 ANEMIA, UNSPECIFIED TYPE: ICD-10-CM

## 2024-02-09 DIAGNOSIS — D64.9 ANEMIA DUE TO UNKNOWN MECHANISM: Primary | ICD-10-CM

## 2024-02-09 DIAGNOSIS — E11.65 TYPE 2 DIABETES MELLITUS WITH HYPERGLYCEMIA, WITH LONG-TERM CURRENT USE OF INSULIN (H): Primary | ICD-10-CM

## 2024-02-09 PROCEDURE — 99207 PR NO BILLABLE SERVICE THIS VISIT: CPT | Mod: 95 | Performed by: NUTRITIONIST

## 2024-02-09 NOTE — NURSING NOTE
Is the patient currently in the state of [place]? YES    Visit mode:VIDEO    If the visit is dropped, the patient can be reconnected by: VIDEO VISIT: Send to e-mail at: [email or web address]    Will anyone else be joining the visit? NO  (If patient encounters technical issues they should call [phone]    How would you like to obtain your AVS? MyChart    Are changes needed to the allergy or medication list? No    Reason for visit: Follow Up    Dianne CHRISTY

## 2024-02-09 NOTE — PROGRESS NOTES
Virtual Visit Details    Type of service:  Video Visit     Originating Location (pt. Location): Home    Distant Location (provider location):  On-site  Platform used for Video Visit: Yenifer

## 2024-02-09 NOTE — PROGRESS NOTES
Diabetes Self-Management Education & Support    Mary Lopez presents today for education related to Type 2 diabetes    Patient is being treated with:  insulin  He is accompanied by spouse    Year of diagnosis: 2023  Referring provider:  Lai  Patient is followed by Suzanne THOMPSON  Living Situation: with spouse   Employment: n/a    PATIENT CONCERNS RELATED TO DIABETES SELF MANAGEMENT:       ASSESSMENT:    Taking Medication:     Current Diabetes Management per Patient:  Taking diabetes medications? yes:     Diabetes Medication(s)       Biguanides       metFORMIN (GLUCOPHAGE XR) 500 MG 24 hr tablet Take 1 tablet (500 mg) by mouth daily (with dinner)       Insulin       insulin degludec (TRESIBA FLEXTOUCH) 100 UNIT/ML pen Inject 40-50 Units Subcutaneous daily Inject 40 units daily.  Increase by 1 unit daily until fasting BG most often <150 as long as this does not lead to overnight or early morning low BG <70.     Insulin Lispro (HUMALOG KWIKPEN) 200 UNIT/ML soln Inject 10-18 Units Subcutaneous 4 times daily (with meals and nightly) Give 10 units plus sliding scale to correct any premeal blood sugars >175.          Taking 46 units of Tresiba now at night.   Humalog 13 dose plus correction scale 1/35/175/210    Monitoring                                    Patient's most recent   Lab Results   Component Value Date    A1C 8.1 10/31/2023        Healthy Eating:   encouraged consistent carb diet    Problem Solving:      Patient is at risk of hypoglycemia?: YES  Hospitalizations for hyper or hypoglycemia: No      EDUCATION and INSTRUCTION PROVIDED AT THIS VISIT:    Reviewed dexcom reports.    Patient-stated goal written and given to Mary Lopez.  Verbalized and demonstrated understanding of instructions.     PLAN:  Increase humalog to 15 units with meals plus correction (was 13 units)  Will keep an eye on overnights, may need to lower tresiba    FOLLOW-UP:    Via video in two weeks    Time spent with  patient at today's visit was 15 minutes.      Any diabetes medication dose changes were made via the CDE Protocol and Collaborative Practice Agreement with [facility].  A copy of this encounter was provided to patient's referring provider.

## 2024-02-10 ENCOUNTER — THERAPY VISIT (OUTPATIENT)
Dept: PHYSICAL THERAPY | Facility: CLINIC | Age: 54
End: 2024-02-10
Attending: INTERNAL MEDICINE
Payer: COMMERCIAL

## 2024-02-10 DIAGNOSIS — M75.101 ROTATOR CUFF SYNDROME OF RIGHT SHOULDER: ICD-10-CM

## 2024-02-10 PROCEDURE — 97161 PT EVAL LOW COMPLEX 20 MIN: CPT | Mod: GP | Performed by: PHYSICAL THERAPIST

## 2024-02-10 PROCEDURE — 97110 THERAPEUTIC EXERCISES: CPT | Mod: GP | Performed by: PHYSICAL THERAPIST

## 2024-02-10 NOTE — PROGRESS NOTES
PHYSICAL THERAPY EVALUATION  Type of Visit: Evaluation    See electronic medical record for Abuse and Falls Screening details.    Subjective       Presenting condition or subjective complaint: Right shoulder pain since falling on it towards the end of last year. No previous shoulder issues. The pain is mostly in his anterior shoulder into the upper arm. Reaching, lifting and general use of arm makes the pain worse. He reports very minimally using arm these days.        Date of onset: 10/01/23    Relevant medical history: Bladder or bowel problems; Change in skin color; Concussions; Diabetes; Pain at night or rest; Significant weakness   Dates & types of surgery: Gall bladder removal about 10 years ago    Prior diagnostic imaging/testing results:       Prior therapy history for the same diagnosis, illness or injury:        Prior Level of Function  Transfers:   Ambulation:   ADL:   IADL:     Living Environment  Social support:     Type of home: [facility]   Stairs to enter the home: Yes 10 Is there a railing: Yes   Ramp: No   Stairs inside the home: Yes 0 (2 flights) Is there a railing: No   Help at home: Self Cares (home health aide/personal care attendant, family, etc)  Equipment owned:       Employment: Yes  (on medial leave)  Hobbies/Interests: none listed    Patient goals for therapy: Move without pain, sleep, use arm    Pain assessment: See objective evaluation for additional pain details     Objective     SHOULDER EVALUATION  PAIN: Pain Level at Rest: 0/10  Pain Level with Use: 10/10  Pain Location: anterior right shoulder, lateral shoulder  Pain Quality: Aching and Dull  Pain Frequency: intermittent  Pain is Exacerbated By: reaching, lifting, laying on side  Pain is Relieved By: otc medications, rest, Voltaren, walking around with gentle movement  Pain Progression: Improved  -Currently in PT for general strength and balance    INTEGUMENTARY (edema, incisions):   POSTURE:   GAIT:   Weightbearing  Status:   Assistive Device(s):   Gait Deviations:   BALANCE/PROPRIOCEPTION:   WEIGHTBEARING ALIGNMENT:   ROM:   (Degrees) Left AROM Left PROM Right AROM  Right PROM   Shoulder Flexion   85 (min pain)    Shoulder Extension       Shoulder Abduction   63 (mod pain)    Shoulder Adduction       Shoulder Internal Rotation   Back pocket    Shoulder External Rotation   30 (mod pain)    Shoulder Horizontal Abduction       Shoulder Horizontal Adduction       Shoulder Flexion ER       Shoulder Flexion IR       Elbow Extension   WNL    Elbow Flexion   WNL    Pain: End range pain for all motions  End feel:     STRENGTH:   Pain: - none + mild ++ moderate +++ severe  Strength Scale: 0-5/5 Left Right   Shoulder Flexion  3-   Shoulder Extension  4   Shoulder Abduction  3+   Shoulder Adduction     Shoulder Internal Rotation  3+   Shoulder External Rotation  3+   Shoulder Horizontal Abduction     Shoulder Horizontal Adduction     Elbow Flexion  4   Elbow Extension  4   Mid Trap     Lower Trap     Rhomboid     Serratus Anterior     *Generalized weakness in all muscle groups most notably ER, IR, flex and ABD.      FLEXIBILITY:   SPECIAL TESTS: not assessed due to high pain levels after ROM and strength testing  PALPATION: general soreness globally most localized to RTC insertions in lateral shoulder  JOINT MOBILITY:   CERVICAL SCREEN:     Assessment & Plan   CLINICAL IMPRESSIONS  Medical Diagnosis: Rotator cuff syndrome of right shoulder    Treatment Diagnosis: Chronic right shoulder pain   Impression/Assessment: Patient is a 53 year old male with right shoulder complaints.  The following significant findings have been identified: Pain, Decreased ROM/flexibility, Decreased joint mobility, Decreased strength, and Impaired posture. These impairments interfere with their ability to perform self care tasks, work tasks, recreational activities, household chores, driving , household mobility, and community mobility as compared to previous  level of function.     Clinical Decision Making (Complexity):  Clinical Presentation: Stable/Uncomplicated  Clinical Presentation Rationale: based on medical and personal factors listed in PT evaluation  Clinical Decision Making (Complexity): Low complexity    PLAN OF CARE  Treatment Interventions:  Modalities: Cryotherapy, Ultrasound  Interventions: Manual Therapy, Neuromuscular Re-education, Therapeutic Activity, Therapeutic Exercise, Self-Care/Home Management    Long Term Goals     PT Goal 4  Goal Identifier: Shoulder  Goal Description: Patient will have equal AROM bilaterally with 3/10 pain at worst  Rationale: to maximize safety and independence with performance of ADLs and functional tasks;to maximize safety and independence within the home;to maximize safety and independence within the community;to maximize safety and independence with self cares  Target Date: 04/06/24      Frequency of Treatment: 1x/week  Duration of Treatment: 8 visits    Recommended Referrals to Other Professionals:  none  Education Assessment:   Learner/Method: No Barriers to Learning;Pictures/Video;Demonstration;Reading;Listening;Patient    Risks and benefits of evaluation/treatment have been explained.   Patient/Family/caregiver agrees with Plan of Care.     Evaluation Time:     PT Eval, Low Complexity Minutes (64527): 20     Signing Clinician: Luke Brunner PT

## 2024-02-13 ENCOUNTER — LAB (OUTPATIENT)
Dept: LAB | Facility: CLINIC | Age: 54
End: 2024-02-13
Payer: COMMERCIAL

## 2024-02-13 ENCOUNTER — PRE VISIT (OUTPATIENT)
Dept: CARDIOLOGY | Facility: CLINIC | Age: 54
End: 2024-02-13
Payer: COMMERCIAL

## 2024-02-13 DIAGNOSIS — K70.31 ALCOHOLIC CIRRHOSIS OF LIVER WITH ASCITES (H): ICD-10-CM

## 2024-02-13 DIAGNOSIS — D64.9 ANEMIA DUE TO UNKNOWN MECHANISM: ICD-10-CM

## 2024-02-13 DIAGNOSIS — R54 FRAILTY: ICD-10-CM

## 2024-02-13 DIAGNOSIS — D64.9 ANEMIA: ICD-10-CM

## 2024-02-13 DIAGNOSIS — D64.9 ANEMIA, UNSPECIFIED TYPE: ICD-10-CM

## 2024-02-13 DIAGNOSIS — E87.1 HYPONATREMIA: ICD-10-CM

## 2024-02-13 DIAGNOSIS — K70.11 ALCOHOLIC HEPATITIS WITH ASCITES (H): ICD-10-CM

## 2024-02-13 LAB
ALBUMIN SERPL BCG-MCNC: 2.6 G/DL (ref 3.5–5.2)
ANION GAP SERPL CALCULATED.3IONS-SCNC: 6 MMOL/L (ref 7–15)
BASOPHILS # BLD AUTO: 0.1 10E3/UL (ref 0–0.2)
BASOPHILS NFR BLD AUTO: 1 %
BILIRUB SERPL-MCNC: 8.9 MG/DL
BUN SERPL-MCNC: 12.6 MG/DL (ref 6–20)
CALCIUM SERPL-MCNC: 8.8 MG/DL (ref 8.6–10)
CHLORIDE SERPL-SCNC: 92 MMOL/L (ref 98–107)
CREAT SERPL-MCNC: 1.02 MG/DL (ref 0.67–1.17)
DEPRECATED HCO3 PLAS-SCNC: 31 MMOL/L (ref 22–29)
EGFRCR SERPLBLD CKD-EPI 2021: 88 ML/MIN/1.73M2
EOSINOPHIL # BLD AUTO: 0.4 10E3/UL (ref 0–0.7)
EOSINOPHIL NFR BLD AUTO: 3 %
ERYTHROCYTE [DISTWIDTH] IN BLOOD BY AUTOMATED COUNT: 14.6 % (ref 10–15)
GLUCOSE SERPL-MCNC: 195 MG/DL (ref 70–99)
HCT VFR BLD AUTO: 24.5 % (ref 40–53)
HGB BLD-MCNC: 8.1 G/DL (ref 13.3–17.7)
IMM GRANULOCYTES # BLD: 0.2 10E3/UL
IMM GRANULOCYTES NFR BLD: 2 %
INR PPP: 2.78 (ref 0.85–1.15)
LYMPHOCYTES # BLD AUTO: 1.2 10E3/UL (ref 0.8–5.3)
LYMPHOCYTES NFR BLD AUTO: 12 %
MCH RBC QN AUTO: 34.3 PG (ref 26.5–33)
MCHC RBC AUTO-ENTMCNC: 33.1 G/DL (ref 31.5–36.5)
MCV RBC AUTO: 104 FL (ref 78–100)
MONOCYTES # BLD AUTO: 0.8 10E3/UL (ref 0–1.3)
MONOCYTES NFR BLD AUTO: 8 %
NEUTROPHILS # BLD AUTO: 7.7 10E3/UL (ref 1.6–8.3)
NEUTROPHILS NFR BLD AUTO: 74 %
NRBC # BLD AUTO: 0 10E3/UL
NRBC BLD AUTO-RTO: 0 /100
PLATELET # BLD AUTO: 105 10E3/UL (ref 150–450)
POTASSIUM SERPL-SCNC: 4.3 MMOL/L (ref 3.4–5.3)
RBC # BLD AUTO: 2.36 10E6/UL (ref 4.4–5.9)
SODIUM SERPL-SCNC: 129 MMOL/L (ref 135–145)
WBC # BLD AUTO: 10.3 10E3/UL (ref 4–11)

## 2024-02-13 PROCEDURE — 85025 COMPLETE CBC W/AUTO DIFF WBC: CPT

## 2024-02-13 PROCEDURE — 85610 PROTHROMBIN TIME: CPT

## 2024-02-13 PROCEDURE — 82247 BILIRUBIN TOTAL: CPT

## 2024-02-13 PROCEDURE — G0480 DRUG TEST DEF 1-7 CLASSES: HCPCS | Mod: 90

## 2024-02-13 PROCEDURE — 80048 BASIC METABOLIC PNL TOTAL CA: CPT

## 2024-02-13 PROCEDURE — 82040 ASSAY OF SERUM ALBUMIN: CPT

## 2024-02-13 PROCEDURE — 36415 COLL VENOUS BLD VENIPUNCTURE: CPT

## 2024-02-13 PROCEDURE — 85045 AUTOMATED RETICULOCYTE COUNT: CPT

## 2024-02-14 ENCOUNTER — VIRTUAL VISIT (OUTPATIENT)
Dept: ONCOLOGY | Facility: HOSPITAL | Age: 54
End: 2024-02-14
Attending: INTERNAL MEDICINE
Payer: COMMERCIAL

## 2024-02-14 ENCOUNTER — MYC MEDICAL ADVICE (OUTPATIENT)
Dept: FAMILY MEDICINE | Facility: CLINIC | Age: 54
End: 2024-02-14

## 2024-02-14 VITALS — HEIGHT: 68 IN | WEIGHT: 210 LBS | BODY MASS INDEX: 31.83 KG/M2

## 2024-02-14 DIAGNOSIS — D64.9 ANEMIA, UNSPECIFIED TYPE: Primary | ICD-10-CM

## 2024-02-14 DIAGNOSIS — M25.511 CHRONIC RIGHT SHOULDER PAIN: ICD-10-CM

## 2024-02-14 DIAGNOSIS — K72.90 DECOMPENSATED HEPATIC CIRRHOSIS (H): ICD-10-CM

## 2024-02-14 DIAGNOSIS — R16.1 SPLENOMEGALY: ICD-10-CM

## 2024-02-14 DIAGNOSIS — I86.4 GASTRIC VARICES: Chronic | ICD-10-CM

## 2024-02-14 DIAGNOSIS — D69.6 THROMBOCYTOPENIA (H): Chronic | ICD-10-CM

## 2024-02-14 DIAGNOSIS — Z14.8 CARRIER OF HEMOCHROMATOSIS HFE GENE MUTATION: ICD-10-CM

## 2024-02-14 DIAGNOSIS — D69.6 THROMBOCYTOPENIA (H): ICD-10-CM

## 2024-02-14 DIAGNOSIS — K74.60 DECOMPENSATED HEPATIC CIRRHOSIS (H): ICD-10-CM

## 2024-02-14 DIAGNOSIS — G89.29 CHRONIC RIGHT SHOULDER PAIN: ICD-10-CM

## 2024-02-14 DIAGNOSIS — S49.91XA SHOULDER INJURY, RIGHT, INITIAL ENCOUNTER: Primary | ICD-10-CM

## 2024-02-14 LAB
RETICS # AUTO: 0.16 10E6/UL (ref 0.03–0.1)
RETICS/RBC NFR AUTO: 6.8 % (ref 0.5–2)

## 2024-02-14 PROCEDURE — 99214 OFFICE O/P EST MOD 30 MIN: CPT | Mod: 95 | Performed by: INTERNAL MEDICINE

## 2024-02-14 RX ORDER — FOLIC ACID 1 MG/1
1 TABLET ORAL DAILY
Qty: 60 TABLET | Refills: 1 | Status: ON HOLD | OUTPATIENT
Start: 2024-02-14 | End: 2024-03-21

## 2024-02-14 ASSESSMENT — PAIN SCALES - GENERAL: PAINLEVEL: MILD PAIN (3)

## 2024-02-14 NOTE — PROGRESS NOTES
Virtual Visit Details    Type of service:  Video Visit   Video start time: 8:25 AM  Video stop time: 8:45 AM  Originating Location (pt. Location): Home    Distant Location (provider location):  On-site  Platform used for Video Visit: [facility] Hematology and Oncology Progress Note    Patient: Mary Lopez  MRN: [id number]  2/14/24        Reason for Visit    Chief Complaint   Patient presents with    RECHECK         Problem List Items Addressed This Visit          Digestive    Gastric varices (Chronic)    Decompensated hepatic cirrhosis (H)       Immune    Splenomegaly       Hematologic    Thrombocytopenia (H24) (Chronic)     Other Visit Diagnoses       Anemia, unspecified type    -  Primary    Relevant Medications    folic acid (FOLVITE) 1 MG tablet    Carrier of hemochromatosis HFE gene mutation                  Assessment and Plan  Macrocytic anemia  End-stage liver disease secondary to alcoholic liver cirrhosis  His hemoglobin has again trended down.  Currently around 7-8.  Continues to have significant microcytosis and fluctuating thrombocytopenia which are all consistent with end-stage liver disease.  He has had pretty significant workup so far and based on the lab studies it looks like his anemia is multifactorial.  His liver cirrhosis is playing a major role can cause bone marrow suppression.  He also has evidence of mild hemolysis with low haptoglobin and elevated LDH which again is not uncommon in patients with end-stage liver disease (spurr cell anemia, fragile RBC membrane).  There is also an element of hypersplenism.  There appears to be no significant evidence of any vitamin or mineral deficiencies.  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  So I recommended avoiding extra iron.    Unfortunately there is no good solution for his anemia at this point in time.  Since this is all tied to his liver disease, liver  transplant would be ultimately be the treatment of choice.      His hemoglobin seems to have fluctuated and more recently it has been on the downtrend.  Hemoglobin was 8.1 yesterday.  Still a bit macrocytic.  Mild thrombocytopenia.  In this setting I recommended avoiding blood transfusion as much as possible due to the risk for iron overload especially in the absence of significant bleeding.  However if the hemoglobin falls below 7 or if he has any significant GI bleed then definitely we can arrange transfusion.  Also if hemoglobin were to trend down then would recommend upper GI endoscopy to make sure were not missing any upper GI bleeding.  Has not had upper endoscopy since July last year.  His count is elevated and in the setting of possible chronic hemolysis I also asked him to start folic acid 1 mg daily.  Continue to monitor hemoglobin levels on a weekly basis.  I will see him back in 2 to 3 months for continued surveillance.           Cancer Staging   No matching staging information was found for the patient.      ECOG Performance    2 - Ambulatory and independent in all ADLs; cannot work; up > 50% of the time         Problem List    Patient Active Problem List   Diagnosis    Primary hypertension    Tobacco abuse disorder    Mild hyperlipidemia    Morbid obesity (H)    Portal hypertensive gastropathy (H)    Splenomegaly    Secondary esophageal 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 (H)    Anemia due to unknown mechanism    Thrombocytopenia (H24)    Persistent insomnia    Bilateral leg edema    Hyponatremia    Decompensated hepatic cirrhosis (H)    Alcoholic hepatitis with ascites (H28)    Alcohol use disorder, severe, in early remission (H)    Gastric varices    Severe malnutrition (H24)    Diabetes mellitus, type 2 (H)    Type 2 diabetes mellitus with hyperglycemia (H)    Generalized muscle weakness    Pulmonary HTN (H)    Hepatic encephalopathy (H)            Interval History   Mary Lopez is a 53 year old  male with end-stage liver disease secondary to alcoholic liver cirrhosis who is seen in the hematology clinic for follow-up as a virtual visit for further evaluation management of anemia.    Previously I saw him for macrocytic anemia in the setting of alcoholic liver cirrhosis.  Workup at that time did not show any obvious cause for his anemia.  It was thought that his anemia was probably multifactorial secondary to end-stage liver disease.  He had evidence of hemolysis with mildly elevated LDH and low haptoglobin however SHEN was negative.  Iron studies were significantly elevated as was ferritin.  Folic acid level was normal.  B12 is elevated.  He had esophageal varices but no evidence of active bleeding.  He required blood transfusion at that time.  Some labs and requested follow-up.  Unfortunately this was not scheduled.  But his hemoglobin slowly recovered and went up to about 11 in November last year.  But since then again he has had declined.  Now around 7-8.  He is quite symptomatic.  Has also been evaluated by liver transplant team and looks like he is undergoing evaluation for the same.  But over the last few months he has had multiple hospitalizations secondary to electrolyte imbalances and hepatic encephalopathy.        Review of Systems  A comprehensive review of systems was negative except for what is noted in the interval history    Current Outpatient Medications   Medication    folic acid (FOLVITE) 1 MG tablet    blood glucose (NO BRAND SPECIFIED) test strip    blood glucose monitoring (NO BRAND SPECIFIED) meter device kit    Continuous Blood Gluc Sensor (DEXCOM G7 SENSOR) MISC    cyclobenzaprine (FLEXERIL) 10 MG tablet    doxepin (SINEQUAN) 10 MG capsule    insulin degludec (TRESIBA FLEXTOUCH) 100 UNIT/ML pen    Insulin Lispro (HUMALOG KWIKPEN) 200 UNIT/ML soln    insulin pen needle (BD PEN NEEDLE SHERRIE 2ND GEN) 32G X 4 MM miscellaneous    lactulose (CHRONULAC) 10 GM/15ML solution    melatonin 10 MG  TABS tablet    metFORMIN (GLUCOPHAGE XR) 500 MG 24 hr tablet    multivitamin w/minerals (THERA-VIT-M) tablet    omeprazole (PRILOSEC) 20 MG DR capsule    potassium chloride ER (KLOR-CON M) 10 MEQ CR tablet    rifaximin (XIFAXAN) 550 MG TABS tablet    spironolactone (ALDACTONE) 25 MG tablet    thin (NO BRAND SPECIFIED) lancets    torsemide (DEMADEX) 10 MG tablet    zinc oxide (DESITIN) 20 % external ointment     No current facility-administered medications for this visit.        Physical Exam        General: alert and cooperative      Lab Results    Recent Results (from the past 168 hour(s))   INR   Result Value Ref Range    INR 2.78 (H) 0.85 - 1.15   Albumin level   Result Value Ref Range    Albumin 2.6 (L) 3.5 - 5.2 g/dL   Bilirubin  total   Result Value Ref Range    Bilirubin Total 8.9 (H) <=1.2 mg/dL   Basic metabolic panel  (Ca, Cl, CO2, Creat, Gluc, K, Na, BUN)   Result Value Ref Range    Sodium 129 (L) 135 - 145 mmol/L    Potassium 4.3 3.4 - 5.3 mmol/L    Chloride 92 (L) 98 - 107 mmol/L    Carbon Dioxide (CO2) 31 (H) 22 - 29 mmol/L    Anion Gap 6 (L) 7 - 15 mmol/L    Urea Nitrogen 12.6 6.0 - 20.0 mg/dL    Creatinine 1.02 0.67 - 1.17 mg/dL    GFR Estimate 88 >60 mL/min/1.73m2    Calcium 8.8 8.6 - 10.0 mg/dL    Glucose 195 (H) 70 - 99 mg/dL   CBC with platelets and differential   Result Value Ref Range    WBC Count 10.3 4.0 - 11.0 10e3/uL    RBC Count 2.36 (L) 4.40 - 5.90 10e6/uL    Hemoglobin 8.1 (L) 13.3 - 17.7 g/dL    Hematocrit 24.5 (L) 40.0 - 53.0 %     (H) 78 - 100 fL    MCH 34.3 (H) 26.5 - 33.0 pg    MCHC 33.1 31.5 - 36.5 g/dL    RDW 14.6 10.0 - 15.0 %    Platelet Count 105 (L) 150 - 450 10e3/uL    % Neutrophils 74 %    % Lymphocytes 12 %    % Monocytes 8 %    % Eosinophils 3 %    % Basophils 1 %    % Immature Granulocytes 2 %    NRBCs per 100 WBC 0 <1 /100    Absolute Neutrophils 7.7 1.6 - 8.3 10e3/uL    Absolute Lymphocytes 1.2 0.8 - 5.3 10e3/uL    Absolute Monocytes 0.8 0.0 - 1.3 10e3/uL     Absolute Eosinophils 0.4 0.0 - 0.7 10e3/uL    Absolute Basophils 0.1 0.0 - 0.2 10e3/uL    Absolute Immature Granulocytes 0.2 <=0.4 10e3/uL    Absolute NRBCs 0.0 10e3/uL   Reticulocyte count   Result Value Ref Range    % Reticulocyte 6.8 (H) 0.5 - 2.0 %    Absolute Reticulocyte 0.157 (H) 0.025 - 0.095 10e6/uL       Imaging    CT Chest w/o contrast    Result Date: 2/6/2024  CT chest without contrast INDICATION: Quantiferon-TB Gold test reaction. Organ transplant candidate. COMPARISON: 9/23/2023 FINDINGS: No contrast. The included thyroid appears unremarkable. Wide common origin of the right brachiocephalic and left common carotid arteries from the aortic arch. Benign-appearing gynecomastia bilaterally. Trace coronary artery calcific density in the left anterior descending coronary artery. No enlarged mediastinal, axillary, lower neck or hilar lymph nodes. Heart size normal. No pericardial effusion or right pleural effusion. Trace left pleural effusion. Aorta normal size. Pulmonary artery caliber mildly enlarged at 3.7 cm suggestive of possible pulmonary artery hypertension. The included upper abdomen shows nodular contour of the liver with perihepatic ascites suggestive of chronic liver disease. The spleen is bulky albeit incompletely imaged. There is trace perisplenic ascites. Multiple varices in the left upper quadrant again are likely related to chronic liver disease. Cholecystectomy clips noted in the right upper quadrant. Varices in the region of the left adrenal gland appear to simulate but not actually represent left adrenal nodule. Bone detail shows reasonable mineralization without suspicious lesion. No paraspinal mass. Detail of the lungs shows subsegmental atelectasis in the left lower lobe other dependent atelectasis in the left lower lobe adjacent to the trace pleural effusion. No suspicious nodule or other opacity.     IMPRESSION: No CT evidence of pulmonary or other manifestations of tuberculosis. Trace  left pleural effusion. Upper abdominal ascites with varices. Cholecystectomy. Atherosclerosis. SOULEYMANE CALL MD   SYSTEM ID:  [id number]    Cardiac Catheterization    Result Date: 1/30/2024    Right sided filling pressures are mildly elevated.   Left sided filling pressures are normal.   Mild elevated pulmonary hypertension.   Hyperdynamic cardiac output level. Minimally elevated PA pressures (mPA 20 mmHg) with minimally elevated right sided filling pressures. PVR < 1 DIAMOND High cardiac output by Naveed and Thermodilution due to cirrhosis. High output is causing slight elevation in PA pressures but there is no intrinsic pulmonary vascular disease or right heart dysfunction.     Radiologist Consult For Cardiology    Result Date: 1/19/2024  EXAM: RADIOLOGIST CONSULT FOR CARDIOLOGY  1/19/2024 1:42 PM HISTORY:  Alcoholic cirrhosis of liver with ascites (H); Portal hypertension (H)   COMPARISON:  Source images, 9/23/2023 CT chest, 2/1/2006 radiograph TECHNIQUE: CT imaging obtained through the mid chest without contrast. Axial MIP reformatted images obtained and reviewed. CONTRAST:  120 cc ml isovue 370 IV. FINDINGS: Lines and tubes: None. Mediastinum: T10 mucous/debris within the interlobar and segmental right middle lobe and right lower lobe bronchi.. Bronchial thickening seen in the central airways. Trace dependent peripheral mucous plugging. Small left larger than right pleural effusions with adjacent compressive atelectasis. Prominent linear atelectasis at the peripheral left lower lobe is slightly more prominent, small amount of fluid tracking into the left oblique fissure. Groundglass and nodular opacities are superimposed on atelectasis within the lingula and left lung base (5/34, 5/90). Similar consolidation and atelectasis in the right middle lobe with air bronchograms (5/39). Marked cardiomegaly with dilated ventricles. No pericardial effusion. Dilated Main pulmonary artery to 32 mm. Normal caliber visualized  thoracic aorta. No thoracic lymphadenopathy by size criteria. Lungs: No focal consolidative airspace/interstitial opacity. No areas of bronchiectasis or architectural distortion. No pleural effusion or pneumothorax. Bones and soft tissues: No conspicuous osseous lesions. Upper abdomen: Limited.     IMPRESSION: 1. Mucous plugging seen within the distal right mainstem bronchus and segmental pulmonary arteries. 2. Increasing left pleural effusion and compressive left greater than right basilar atelectasis. 3. Worsening central and basilar pulmonary edema. Similar consolidation/atelectasis in the right middle lobe. Please see cardiology report for detailed analysis of the cardiac structures. I have personally reviewed the examination and initial interpretation and I agree with the findings. SHIVANI JUSTIN MD   SYSTEM ID:  [id number]    CTA Angiogram coronary artery    Result Date: 1/19/2024  Procedure: CTA ANGIOGRAM CORONARY ARTERY Examination Date: 1/19/2024 1:42 PM Indication:53 year-old male with history of cirrhosis being evaluated for liver transplant. Assess for CAD. Ordering Provider: JOSTIN MONTEZ Overall quality of the study: Good. PROCEDURE: ECG gated multi-slice computed tomography of the heart with and without intravenous contrast  (Isovue 370, 120 mL, wasted 0 mL) was  performed on a Siemens Dual Source Flash scanner without incident. Medical therapy was used to optimize heart rate (Metoprolol 100 mg Oral/ Metoprolol 0 mg IV/ Ivabradine 10 mg Oral). Sublingual Nitrostat 0.8 mg was given prior to scanning. Coronary artery calcium score was performed using the Flash scanner protocol. CTA was performed in the spiral mode at a heart rate of 58 bpm with 120 kVp. Images were reconstructed and analyzed on a [employer] workstation. Scan protocol was optimized to minimize radiation exposure. The total radiation exposure including calcium score was calculated to be 637 DLP, and 8.92 mSv.      IMPRESSION: 1.   Minimal non-obstructive coronary artery disease. 2.  Total Agatston score 7 placing the patient in the 60th percentile when compared to age and gender matched control group. 3.  Please review Radiology report for incidental noncardiac findings that will follow separately. FINDINGS: CORONARY CALCIUM SCORE Total Agatston calcium score: 7 Left main: 0 Left anterior descendin Left circumflex: 0 Right coronary artery: 0 This places the patient in the 60th  percentile when compared to age and gender matched control group. CORONARY ANGIOGRAPHY DOMINANCE: Right dominant system. Normal variant coronary origins and course. The conus artery has a separate origin from the right sinus of Valsalva LEFT MAIN: The left main arises normally from the left coronary cusp and is widely patent without any detectable stenosis. LEFT ANTERIOR DESCENDING: Proximal LAD: Minimal (<25%) stenosis composed of mixed plaque. The remainder of the left anterior descending and its major diagonal branches are patent with minimal luminal irregularities. LEFT CIRCUMFLEX: The left circumflex and its major branches are widely patent without any detectable stenosis. RIGHT CORONARY ARTERY: The right coronary artery and its major branches are widely patent without any detectable stenosis. ADDITIONAL FINDINGS: Mild aortic valve calcification is present. The proximal ascending aorta is normal in size. Normal pulmonary venous anatomy with all four pulmonary veins draining into the left atrium.  The left atrial appendage is free of thrombus. There is no left ventricular mass or thrombus. Normal pericardial thickness. There is no pericardial effusion. Please review Radiology report for incidental noncardiac findings that will follow separately. LUCIAN NELSON MD   SYSTEM ID:  [id number]    Dexa hip/pelvis/spine [[id number]] > 40 years or previous steroid use.  Bone age for pediatric patients.    Result Date: 2024  Images from the original result were not  [address] [address] Phone: [phone]   Fax: [phone] Impression Based on BMD diagnosis is consistent with normal bone density based on WHO criteria Ref. 1 Results Lumbar spine T-score 0.9 (L2-3), BMD is 1.345 g/cm2 Left femoral neck T-score -0.2 Right femoral neck T-score -0.1 Left total hip T-score -0.5 , BMD is 1.026 g/cm2 Right total hip T-score -0.4, BMD is 1.047 g/cm2 Interval change No prior study available for comparison. Fracture risk Bone density is normal, fracture risk calculation is not indicated Repeat Recommend repeat DXA in 1-2 years if on long term glucocorticoid therapy post-transplant. Otherwise, can follow-up per clinical indication. Sclerotic changes in the lumbar spine limit quality of BMD measurement.  Consider ordering appendicular (i.e. radius) DXA in addition to axial (i.e. spine and hips) DXA for future studies. The above are general recommendations for follow-up testing and intervals between BMD testing should be determined according to each patient's clinical status. Principal result : RENETTA Roland MD, [facility] Division of Endocrinology and Diabetes  Department of Medicine Technical quality Satisfactory. Abnormal vertebrae may be excluded from analysis if there is more than a 1.0 T-score difference between the vertebrae in question and adjacent vertebrae. Note the wide range in BMD values and T-scores within the lumbar spine. In the circumstances, the lumbar spine is represented by L2-L3. References: Ref. 1. WHO categories:        T-score > -1.0  = normal             .                              T-score -1.0 to -2.5 = low bone density T-score < -2.5 = osteoporosis        .  Ref. 2. 2015 ISCD official position statements:  www.iscd.org.  Ref. 3.  Today's examination is compared to the technically similar prior study of the total hip and femur if available. Only changes deemed likely to be  significant based on historical data are reported. According to the ISCD position statements, total hip rather than femoral neck regions are to be compared because larger areas give better precision. LSC = least significant changes at the [facility] (historical data)  AP spine =  0.032 g/cm2  (6/26/2007)  Left hip = 0.029 g/cm2  (6/15/2007)  Right hip = 0.018 g/cm2  (6/15/2007)  Left mid radius = 0.043 g/cm2  (6/26/2007) Please note that the differential diagnosis of increase in bone density at the lumbar spine includes improvement due to pharmacotherapy vs inter-current progression of spine degeneration or fracture. Ref. 4 Fracture risk is calculated in patients aged 40 to 90 years old with low bone density not on osteoporosis treatment. A 10 year fracture risk of 3% and higher for hip fracture and 20% and higher for major osteoporotic fracture is considered higher than acceptable risk and might be an indication for medical treatment.  Ref. 5.  By definition, osteoporosis may be diagnosed in the presence or with the history of a low trauma or fragility fracture.  Fragility and low trauma fracture is defined as a fracture resulting from the force of a fall from a standing height or less or a bone that breaks under conditions that would not cause a normal bone to break.   Ref. 6. [employer]F Physician's Guideline Website address:  www.nof.org.     A total of 35 min were spent today on this visit which included face to face conversation with the patient, EMR review, counseling and co-ordination of care and medical documentation.    Signed by: Jose Munoz MD

## 2024-02-14 NOTE — NURSING NOTE
Is the patient currently in the state of [place]? YES    Visit mode:VIDEO    If the visit is dropped, the patient can be reconnected by: VIDEO VISIT: Text to cell phone:   Telephone Information:   Mobile [phone]       Will anyone else be joining the visit? NO  (If patient encounters technical issues they should call [phone]    How would you like to obtain your AVS? MyChart    Are changes needed to the allergy or medication list? Pt stated no changes to allergies and Pt stated no med changes    Reason for visit: JOELLE Clifford LPN

## 2024-02-15 ENCOUNTER — ORGAN (OUTPATIENT)
Dept: TRANSPLANT | Facility: CLINIC | Age: 54
End: 2024-02-15

## 2024-02-15 ENCOUNTER — APPOINTMENT (OUTPATIENT)
Dept: GENERAL RADIOLOGY | Facility: CLINIC | Age: 54
End: 2024-02-15
Attending: PHYSICIAN ASSISTANT
Payer: COMMERCIAL

## 2024-02-15 ENCOUNTER — APPOINTMENT (OUTPATIENT)
Dept: CT IMAGING | Facility: CLINIC | Age: 54
End: 2024-02-15
Attending: TRANSPLANT SURGERY
Payer: COMMERCIAL

## 2024-02-15 ENCOUNTER — HOSPITAL ENCOUNTER (INPATIENT)
Facility: CLINIC | Age: 54
Setting detail: SURGERY ADMIT
End: 2024-02-15
Attending: TRANSPLANT SURGERY | Admitting: TRANSPLANT SURGERY
Payer: COMMERCIAL

## 2024-02-15 ENCOUNTER — HOSPITAL ENCOUNTER (OUTPATIENT)
Facility: CLINIC | Age: 54
Discharge: HOME | End: 2024-02-16
Attending: TRANSPLANT SURGERY | Admitting: TRANSPLANT SURGERY
Payer: COMMERCIAL

## 2024-02-15 VITALS
HEART RATE: 89 BPM | DIASTOLIC BLOOD PRESSURE: 54 MMHG | OXYGEN SATURATION: 93 % | TEMPERATURE: 97.6 F | RESPIRATION RATE: 16 BRPM | SYSTOLIC BLOOD PRESSURE: 107 MMHG

## 2024-02-15 PROBLEM — Z76.82 LIVER TRANSPLANT CANDIDATE: Status: ACTIVE | Noted: 2024-02-15

## 2024-02-15 LAB
ABO/RH(D): NORMAL
ALBUMIN SERPL BCG-MCNC: 2.6 G/DL (ref 3.5–5.2)
ALBUMIN UR-MCNC: NEGATIVE MG/DL
ALP SERPL-CCNC: 313 U/L (ref 40–150)
ALT SERPL W P-5'-P-CCNC: 30 U/L (ref 0–70)
AMYLASE SERPL-CCNC: 28 U/L (ref 28–100)
ANION GAP SERPL CALCULATED.3IONS-SCNC: 7 MMOL/L (ref 7–15)
ANTIBODY SCREEN: NEGATIVE
APPEARANCE UR: CLEAR
APTT PPP: 50 SECONDS (ref 22–38)
AST SERPL W P-5'-P-CCNC: 71 U/L (ref 0–45)
BASOPHILS # BLD AUTO: 0.1 10E3/UL (ref 0–0.2)
BASOPHILS NFR BLD AUTO: 1 %
BILIRUB SERPL-MCNC: 8.6 MG/DL
BILIRUB UR QL STRIP: ABNORMAL
BUN SERPL-MCNC: 18.9 MG/DL (ref 6–20)
CALCIUM SERPL-MCNC: 8.4 MG/DL (ref 8.6–10)
CHLORIDE SERPL-SCNC: 90 MMOL/L (ref 98–107)
COLOR UR AUTO: ABNORMAL
CREAT SERPL-MCNC: 1.11 MG/DL (ref 0.67–1.17)
CYCLE THRESHOLD (CT): 43.6
DEPRECATED HCO3 PLAS-SCNC: 27 MMOL/L (ref 22–29)
EGFRCR SERPLBLD CKD-EPI 2021: 80 ML/MIN/1.73M2
EOSINOPHIL # BLD AUTO: 0.3 10E3/UL (ref 0–0.7)
EOSINOPHIL NFR BLD AUTO: 3 %
ERYTHROCYTE [DISTWIDTH] IN BLOOD BY AUTOMATED COUNT: 14.4 % (ref 10–15)
FIBRINOGEN PPP-MCNC: 125 MG/DL (ref 170–490)
GLUCOSE SERPL-MCNC: 289 MG/DL (ref 70–99)
GLUCOSE UR STRIP-MCNC: NEGATIVE MG/DL
HBV CORE AB SERPL QL IA: NONREACTIVE
HBV SURFACE AB SERPL IA-ACNC: <3.5 M[IU]/ML
HBV SURFACE AB SERPL IA-ACNC: NONREACTIVE M[IU]/ML
HBV SURFACE AG SERPL QL IA: NONREACTIVE
HCT VFR BLD AUTO: 21.2 % (ref 40–53)
HCV AB SERPL QL IA: NONREACTIVE
HGB BLD-MCNC: 7 G/DL (ref 13.3–17.7)
HGB UR QL STRIP: NEGATIVE
HIV 1+2 AB+HIV1 P24 AG SERPL QL IA: NONREACTIVE
HYALINE CASTS: 48 /LPF
IMM GRANULOCYTES # BLD: 0.3 10E3/UL
IMM GRANULOCYTES NFR BLD: 3 %
INR PPP: 2.84 (ref 0.85–1.15)
KETONES UR STRIP-MCNC: NEGATIVE MG/DL
LEUKOCYTE ESTERASE UR QL STRIP: NEGATIVE
LYMPHOCYTES # BLD AUTO: 0.9 10E3/UL (ref 0.8–5.3)
LYMPHOCYTES NFR BLD AUTO: 10 %
MAGNESIUM SERPL-MCNC: 1.7 MG/DL (ref 1.7–2.3)
MCH RBC QN AUTO: 34.1 PG (ref 26.5–33)
MCHC RBC AUTO-ENTMCNC: 33 G/DL (ref 31.5–36.5)
MCV RBC AUTO: 103 FL (ref 78–100)
MONOCYTES # BLD AUTO: 0.8 10E3/UL (ref 0–1.3)
MONOCYTES NFR BLD AUTO: 8 %
MUCOUS THREADS #/AREA URNS LPF: PRESENT /LPF
NEUTROPHILS # BLD AUTO: 7.2 10E3/UL (ref 1.6–8.3)
NEUTROPHILS NFR BLD AUTO: 75 %
NITRATE UR QL: NEGATIVE
NRBC # BLD AUTO: 0 10E3/UL
NRBC BLD AUTO-RTO: 0 /100
PH UR STRIP: 5 [PH] (ref 5–7)
PHOSPHATE SERPL-MCNC: 4 MG/DL (ref 2.5–4.5)
PLATELET # BLD AUTO: 81 10E3/UL (ref 150–450)
POTASSIUM SERPL-SCNC: 4.8 MMOL/L (ref 3.4–5.3)
PROT SERPL-MCNC: 6.5 G/DL (ref 6.4–8.3)
RBC # BLD AUTO: 2.05 10E6/UL (ref 4.4–5.9)
RBC URINE: <1 /HPF
SARS-COV-2 RNA RESP QL NAA+PROBE: POSITIVE
SODIUM SERPL-SCNC: 124 MMOL/L (ref 135–145)
SP GR UR STRIP: 1.01 (ref 1–1.03)
SPECIMEN EXPIRATION DATE: NORMAL
SQUAMOUS EPITHELIAL: <1 /HPF
UROBILINOGEN UR STRIP-MCNC: NORMAL MG/DL
WBC # BLD AUTO: 9.5 10E3/UL (ref 4–11)
WBC URINE: <1 /HPF

## 2024-02-15 PROCEDURE — 81001 URINALYSIS AUTO W/SCOPE: CPT | Performed by: PHYSICIAN ASSISTANT

## 2024-02-15 PROCEDURE — 86706 HEP B SURFACE ANTIBODY: CPT | Performed by: PHYSICIAN ASSISTANT

## 2024-02-15 PROCEDURE — 86803 HEPATITIS C AB TEST: CPT | Performed by: PHYSICIAN ASSISTANT

## 2024-02-15 PROCEDURE — 85730 THROMBOPLASTIN TIME PARTIAL: CPT | Performed by: PHYSICIAN ASSISTANT

## 2024-02-15 PROCEDURE — 71250 CT THORAX DX C-: CPT

## 2024-02-15 PROCEDURE — 86645 CMV ANTIBODY IGM: CPT | Performed by: PHYSICIAN ASSISTANT

## 2024-02-15 PROCEDURE — 86900 BLOOD TYPING SEROLOGIC ABO: CPT | Performed by: PHYSICIAN ASSISTANT

## 2024-02-15 PROCEDURE — 87340 HEPATITIS B SURFACE AG IA: CPT | Performed by: PHYSICIAN ASSISTANT

## 2024-02-15 PROCEDURE — 99213 OFFICE O/P EST LOW 20 MIN: CPT | Mod: GC | Performed by: TRANSPLANT SURGERY

## 2024-02-15 PROCEDURE — 86644 CMV ANTIBODY: CPT | Performed by: PHYSICIAN ASSISTANT

## 2024-02-15 PROCEDURE — 74176 CT ABD & PELVIS W/O CONTRAST: CPT | Mod: 26 | Performed by: RADIOLOGY

## 2024-02-15 PROCEDURE — 86665 EPSTEIN-BARR CAPSID VCA: CPT | Performed by: PHYSICIAN ASSISTANT

## 2024-02-15 PROCEDURE — 82150 ASSAY OF AMYLASE: CPT | Performed by: PHYSICIAN ASSISTANT

## 2024-02-15 PROCEDURE — 999N000128 HC STATISTIC PERIPHERAL IV START W/O US GUIDANCE

## 2024-02-15 PROCEDURE — 85025 COMPLETE CBC W/AUTO DIFF WBC: CPT | Performed by: PHYSICIAN ASSISTANT

## 2024-02-15 PROCEDURE — 71046 X-RAY EXAM CHEST 2 VIEWS: CPT

## 2024-02-15 PROCEDURE — 36415 COLL VENOUS BLD VENIPUNCTURE: CPT | Performed by: PHYSICIAN ASSISTANT

## 2024-02-15 PROCEDURE — 85384 FIBRINOGEN ACTIVITY: CPT | Performed by: PHYSICIAN ASSISTANT

## 2024-02-15 PROCEDURE — 86704 HEP B CORE ANTIBODY TOTAL: CPT | Performed by: PHYSICIAN ASSISTANT

## 2024-02-15 PROCEDURE — 999N000054 HC STATISTIC EKG NON-CHARGEABLE

## 2024-02-15 PROCEDURE — 85610 PROTHROMBIN TIME: CPT | Performed by: PHYSICIAN ASSISTANT

## 2024-02-15 PROCEDURE — 71250 CT THORAX DX C-: CPT | Mod: 26 | Performed by: RADIOLOGY

## 2024-02-15 PROCEDURE — 87389 HIV-1 AG W/HIV-1&-2 AB AG IA: CPT | Performed by: PHYSICIAN ASSISTANT

## 2024-02-15 PROCEDURE — 87081 CULTURE SCREEN ONLY: CPT | Performed by: PHYSICIAN ASSISTANT

## 2024-02-15 PROCEDURE — 87635 SARS-COV-2 COVID-19 AMP PRB: CPT | Performed by: PHYSICIAN ASSISTANT

## 2024-02-15 PROCEDURE — 87516 HEPATITIS B DNA AMP PROBE: CPT | Performed by: PHYSICIAN ASSISTANT

## 2024-02-15 PROCEDURE — 71046 X-RAY EXAM CHEST 2 VIEWS: CPT | Mod: 26 | Performed by: RADIOLOGY

## 2024-02-15 PROCEDURE — 84100 ASSAY OF PHOSPHORUS: CPT | Performed by: PHYSICIAN ASSISTANT

## 2024-02-15 PROCEDURE — 83735 ASSAY OF MAGNESIUM: CPT | Performed by: PHYSICIAN ASSISTANT

## 2024-02-15 PROCEDURE — 82040 ASSAY OF SERUM ALBUMIN: CPT | Performed by: PHYSICIAN ASSISTANT

## 2024-02-15 PROCEDURE — 87086 URINE CULTURE/COLONY COUNT: CPT | Performed by: PHYSICIAN ASSISTANT

## 2024-02-15 RX ORDER — DEXTROSE MONOHYDRATE 100 MG/ML
INJECTION, SOLUTION INTRAVENOUS CONTINUOUS PRN
Status: CANCELLED | OUTPATIENT
Start: 2024-02-15

## 2024-02-15 RX ORDER — NOREPINEPHRINE BITARTRATE 0.06 MG/ML
.01-.6 INJECTION, SOLUTION INTRAVENOUS CONTINUOUS
Status: CANCELLED | OUTPATIENT
Start: 2024-02-15

## 2024-02-15 RX ORDER — PIPERACILLIN SODIUM, TAZOBACTAM SODIUM 3; .375 G/15ML; G/15ML
3.38 INJECTION, POWDER, LYOPHILIZED, FOR SOLUTION INTRAVENOUS
Status: DISCONTINUED | OUTPATIENT
Start: 2024-02-15 | End: 2024-02-16 | Stop reason: HOSPADM

## 2024-02-15 RX ORDER — ROPIVACAINE IN 0.9% SOD CHL/PF 0.1 %
.03-.125 PLASTIC BAG, INJECTION (ML) EPIDURAL CONTINUOUS
Status: DISCONTINUED | OUTPATIENT
Start: 2024-02-15 | End: 2024-02-16 | Stop reason: HOSPADM

## 2024-02-15 RX ORDER — ACETAMINOPHEN 325 MG/1
975 TABLET ORAL ONCE
Status: DISCONTINUED | OUTPATIENT
Start: 2024-02-15 | End: 2024-02-16 | Stop reason: HOSPADM

## 2024-02-15 RX ORDER — LIDOCAINE 40 MG/G
CREAM TOPICAL
Status: DISCONTINUED | OUTPATIENT
Start: 2024-02-15 | End: 2024-02-16 | Stop reason: HOSPADM

## 2024-02-15 RX ORDER — ROPIVACAINE IN 0.9% SOD CHL/PF 0.1 %
.03-.125 PLASTIC BAG, INJECTION (ML) EPIDURAL CONTINUOUS
Status: CANCELLED | OUTPATIENT
Start: 2024-02-15

## 2024-02-15 RX ORDER — DEXTROSE MONOHYDRATE 25 G/50ML
25-50 INJECTION, SOLUTION INTRAVENOUS
Status: CANCELLED | OUTPATIENT
Start: 2024-02-15

## 2024-02-15 RX ORDER — FLUCONAZOLE 2 MG/ML
400 INJECTION, SOLUTION INTRAVENOUS ONCE
Status: DISCONTINUED | OUTPATIENT
Start: 2024-02-15 | End: 2024-02-16 | Stop reason: HOSPADM

## 2024-02-15 RX ORDER — NICOTINE POLACRILEX 4 MG
15-30 LOZENGE BUCCAL
Status: CANCELLED | OUTPATIENT
Start: 2024-02-15

## 2024-02-15 RX ORDER — PIPERACILLIN SODIUM, TAZOBACTAM SODIUM 3; .375 G/15ML; G/15ML
3.38 INJECTION, POWDER, LYOPHILIZED, FOR SOLUTION INTRAVENOUS ONCE
Status: DISCONTINUED | OUTPATIENT
Start: 2024-02-15 | End: 2024-02-16 | Stop reason: HOSPADM

## 2024-02-15 ASSESSMENT — ACTIVITIES OF DAILY LIVING (ADL)
DRESSING/BATHING_DIFFICULTY: NO
TOILETING: 0-->INDEPENDENT
TOILETING_MANAGEMENT: URGENCY
ADLS_ACUITY_SCORE: 21
TOILETING: 0-->INDEPENDENT
FALL_HISTORY_WITHIN_LAST_SIX_MONTHS: YES
TOILETING_ISSUES: YES
WEAR_GLASSES_OR_BLIND: NO
HEARING_DIFFICULTY_OR_DEAF: NO
ADLS_ACUITY_SCORE: 23
DIFFICULTY_EATING/SWALLOWING: NO
WALKING_OR_CLIMBING_STAIRS_DIFFICULTY: YES
NUMBER_OF_TIMES_PATIENT_HAS_FALLEN_WITHIN_LAST_SIX_MONTHS: 2
CONCENTRATING,_REMEMBERING_OR_MAKING_DECISIONS_DIFFICULTY: YES
DIFFICULTY_COMMUNICATING: NO
CHANGE_IN_FUNCTIONAL_STATUS_SINCE_ONSET_OF_CURRENT_ILLNESS/INJURY: NO
DOING_ERRANDS_INDEPENDENTLY_DIFFICULTY: NO

## 2024-02-15 NOTE — H&P
Physician Attestation   I saw this patient with the resident and agree with the resident/fellow's findings and plan of care as documented in the note.      Key findings: patient was positive for COVID and transplant cancelled    Mutiple phone calls to ID made    I have personally reviewed the following data over the past 24 hrs:    11.0  \   7.1 (L)   / 53 (L)     132 (L) 104 22.7 (H) /  231 (H)   3.9 21 (L) 0.99 \     ALT: 52 AST: 100 (H) AP: 166 (H) TBILI: 9.4 (H)   ALB: 2.2 (L) TOT PROTEIN: 5.4 (L) LIPASE: N/A     INR:  3.13 (H) PTT:  N/A   D-dimer:  N/A Fibrinogen:  N/A         Ryder Cortez MD  Date of Service (when I saw the patient): 2/15/2023  [facility] [place]    History and Physical  Transplant Surgery     Date of Admission:  2/15/2024    Assessment & Plan   Mary Lopez is a 53 year old male with a history of type 2 diabetes mellitus, EtOH cirrhosis (sober since 23), complicated by encephalopathy, hyponatremia, chronic thrombocytopenia, macrocytic anemia, with recent hospitalization (2024) for encephalopathy and suspected SBP (no safe window to complete para). He now presents as a candidate for possible  donor liver transplant.    The patient was found to be COVID+, though asymptomatic. CT scan demonstrates basilar consolidations as well as concern for small bowel obstruction, despite lack of symptoms. Donor liver was deemed ineligible for transplant, and the patient was discharged with a plan to repeat COVID testing in 10 days.    MELD 3.0: 32 at 2/15/2024  8:49 PM  MELD-Na: 32 at 2/15/2024  8:49 PM  Calculated from:  Serum Creatinine: 1.11 mg/dL at 2/15/2024  8:49 PM  Serum Sodium: 124 mmol/L (Using min of 125 mmol/L) at 2/15/2024  8:49 PM  Total Bilirubin: 8.6 mg/dL at 2/15/2024  8:49 PM  Serum Albumin: 2.6 g/dL at 2/15/2024  8:49 PM  INR(ratio): 2.84 at 2/15/2024  8:34 PM  Age at listin years  Sex: Male at 2/15/2024  8:49  PM      Plan:   -Follow up in 10 days for repeat COVID test      Jak Murillo MD  General Surgery PGY-1  Pager: [phone]      Code Status   Full Code    Primary Care Physician   Jimmie Hoyt    Chief Complaint    donor liver transplant candidate    History is obtained from the patient and electronic health record    History of Present Illness   Mary Lopez is a 53 year old male with a history of EtOH cirrhosis, complicated by encephalopathy, hyponatremia, chronic thrombocytopenia, macrocytic anemia, with recent hospitalization (2024) for encephalopathy and suspected SBP (no safe window to complete para). Also with a history of type 2 diabetes mellitus. He now presents as a candidate for possible  donor liver transplant.    At the time of admission, the patient was found to be COVID+, though asymptomatic. CT scan demonstrates basilar consolidations as well as concern for small bowel obstruction, despite lack of symptoms. ID transplant was consulted to obtain recommendations whether to go through with the surgery, however donor liver was deemed ineligible for transplant. The patient was discharged with a plan to repeat COVID testing in 10 days.    Past Medical History    I have reviewed this patient's medical history and updated it with pertinent information if needed.   Past Medical History:   Diagnosis Date    ANGEL (acute kidney injury) (H24)     Alcoholic cirrhosis of liver without ascites (H) 2023    Alcoholic hepatitis with ascites (H28) 10/03/2023    Alcoholic hepatitis without ascites (H28) 2023    Closed fracture of one rib of left side 2023    Concussion without loss of consciousness 2020    Decompensated hepatic cirrhosis (H) 09/15/2023    Diabetes mellitus, type 2 (H) 2023    Essential hypertension 2020    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)     Mild hyperlipidemia 2021    Persistent insomnia 2023     Portal hypertension (H) 07/13/2023    Scrotal abscess     Secondary esophageal 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 (H) 07/13/2023    Tobacco abuse disorder 03/11/2020    Type 2 diabetes mellitus with hyperglycemia (H) 12/22/2023       Past Surgical History   I have reviewed this patient's surgical history and updated it with pertinent information if needed.  Past Surgical History:   Procedure Laterality Date    CHOLECYSTECTOMY      COLONOSCOPY N/A 1/2/2024    Procedure: COLONOSCOPY, WITH POLYPECTOMY;  Surgeon: Jak Urbina MD;  Location: [facility]    CV RIGHT HEART CATH MEASUREMENTS RECORDED N/A 1/30/2024    Procedure: Right Heart Catheterization;  Surgeon: Alfred Tafoya MD;  [facility]    TONSILLECTOMY      VASECTOMY         Prior to Admission Medications   Prior to Admission Medications   Prescriptions Last Dose Informant Patient Reported? Taking?   Continuous Blood Gluc Sensor (DEXCOM G7 SENSOR) MISC   No No   Sig: Change every 10 days.   Insulin Lispro (HUMALOG KWIKPEN) 200 UNIT/ML soln   No No   Sig: Inject 10-18 Units Subcutaneous 4 times daily (with meals and nightly) Give 10 units plus sliding scale to correct any premeal blood sugars >175.   blood glucose (NO BRAND SPECIFIED) test strip   No No   Sig: Use to test blood sugar two times daily or as directed. To accompany: Blood Glucose Monitor Brands: per insurance.   blood glucose monitoring (NO BRAND SPECIFIED) meter device kit   No No   Sig: Use to test blood sugar twice times daily or as directed. Preferred blood glucose meter OR supplies to accompany: Blood Glucose Monitor Brands: per insurance.   cyclobenzaprine (FLEXERIL) 10 MG tablet   No No   Sig: Take 1 tablet (10 mg) by mouth 3 times daily as needed for muscle spasms   doxepin (SINEQUAN) 10 MG capsule   No No   Sig: TAKE 1 CAPSULE(10 MG) BY MOUTH AT BEDTIME   folic acid (FOLVITE) 1 MG tablet   No No   Sig: Take 1 tablet (1 mg) by mouth daily   insulin degludec (TRESIBA  FLEXTOUCH) 100 UNIT/ML pen   No No   Sig: Inject 40-50 Units Subcutaneous daily Inject 40 units daily.  Increase by 1 unit daily until fasting BG most often <150 as long as this does not lead to overnight or early morning low BG <70.   insulin pen needle (BD PEN NEEDLE SHERRIE 2ND GEN) 32G X 4 MM miscellaneous   No No   Sig: USES 5 PER DAY   lactulose (CHRONULAC) 10 GM/15ML solution   No No   Sig: TAKE 30 MLS BY MOUTH 2 TIMES DAILY   melatonin 10 MG TABS tablet   No No   Sig: Take 1 tablet (10 mg) by mouth nightly as needed for sleep   metFORMIN (GLUCOPHAGE XR) 500 MG 24 hr tablet   No No   Sig: Take 1 tablet (500 mg) by mouth daily (with dinner)   multivitamin w/minerals (THERA-VIT-M) tablet   No No   Sig: TAKE 1 TABLET BY MOUTH DAILY   omeprazole (PRILOSEC) 20 MG DR capsule   Yes No   Sig: Take 1 capsule (20 mg) by mouth 2 times daily   potassium chloride ER (KLOR-CON M) 10 MEQ CR tablet   No No   Sig: Take 2 tablets (20 mEq) by mouth 2 times daily   rifaximin (XIFAXAN) 550 MG TABS tablet   No No   Sig: Take 1 tablet (550 mg) by mouth 2 times daily for 180 days   spironolactone (ALDACTONE) 25 MG tablet   No No   Sig: Take 1 tablet (25 mg) by mouth daily   thin (NO BRAND SPECIFIED) lancets   No No   Sig: Use with lanceting device. To accompany: Blood Glucose Monitor Brands: per insurance.   torsemide (DEMADEX) 10 MG tablet   No No   Sig: Take 3 tablets (30 mg) by mouth daily   zinc oxide (DESITIN) 20 % external ointment   Yes No   Sig: Apply topically as needed for dry skin or irritation      Facility-Administered Medications: None     Allergies   Allergies   Allergen Reactions    Food Anaphylaxis     baked beans    Fish Allergy     Lactose GI Disturbance    Pineapple Itching       Social History   I have reviewed this patient's social history and updated it with pertinent information if needed. Mary Lopez  reports that he has quit smoking. His smoking use included cigarettes. He has never been exposed to  tobacco smoke. His smokeless tobacco use includes chew. He reports that he does not currently use alcohol after a past usage of about 12.0 standard drinks of alcohol per week. He reports that he does not currently use drugs.    Family History   Family History   Problem Relation Age of Onset    Anxiety Disorder Mother     Depression Mother     Bipolar Disorder Mother     Chronic Obstructive Pulmonary Disease Mother     Lung Cancer Mother 81    Morbid Obesity Father     Diabetes Father     Diabetes Type 2  Brother     Substance Abuse Maternal Grandfather     Substance Abuse Paternal Grandfather     Colon Cancer No family hx of     Liver Disease No family hx of        Review of Systems   The 10 point Review of Systems is negative other than noted in the HPI.    Physical Exam   Temp: 97.6  F (36.4  C) Temp src: Oral BP: 107/54 Pulse: 89   Resp: 16 SpO2: 93 % O2 Device: None (Room air)    Vital Signs with Ranges  Temp:  [97.6  F (36.4  C)] 97.6  F (36.4  C)  Pulse:  [89-99] 89  Resp:  [16] 16  BP: (100-107)/(54-63) 107/54  SpO2:  [93 %] 93 %  0 lbs 0 oz      Constitutional: awake, alert, sitting up in bed, NAD  Respiratory: nonlabored breathing on room air, CTABL  Cardiovascular: RRR, no murmur  GI: abdomen soft, nondistended, nontender  Skin: No rash  Musculoskeletal: moving all extremities  Neurologic: AOx3, no focal deficits  Neuropsychiatric: appropriate mood and affect    Data   Results for orders placed or performed during the hospital encounter of 02/15/24 (from the past 24 hour(s))   UA with Microscopic reflex to Culture    Specimen: Urine, Clean Catch   Result Value Ref Range    Color Urine Dark Yellow (A) Colorless, Straw, Light Yellow, Yellow    Appearance Urine Clear Clear    Glucose Urine Negative Negative mg/dL    Bilirubin Urine Small (A) Negative    Ketones Urine Negative Negative mg/dL    Specific Gravity Urine 1.014 1.003 - 1.035    Blood Urine Negative Negative    pH Urine 5.0 5.0 - 7.0    Protein  Albumin Urine Negative Negative mg/dL    Urobilinogen Urine Normal Normal, 2.0 mg/dL    Nitrite Urine Negative Negative    Leukocyte Esterase Urine Negative Negative    Mucus Urine Present (A) None Seen /LPF    RBC Urine <1 <=2 /HPF    WBC Urine <1 <=5 /HPF    Squamous Epithelials Urine <1 <=1 /HPF    Hyaline Casts Urine 48 (H) <=2 /LPF    Narrative    Urine Culture not indicated   Asymptomatic COVID-19 Virus (Coronavirus) by PCR Nasopharyngeal    Specimen: Nasopharyngeal; Swab   Result Value Ref Range    SARS CoV2 PCR Positive (A) Negative    Cycle threshold 43.6     Narrative    Testing was performed using the Xpert Xpress SARS-CoV-2 Assay on the Cepheid Gene-Xpert Instrument Systems. Additional information about this Emergency Use Authorization (EUA) assay can be found via the Lab Guide. This test should be ordered for the detection of SARS-CoV-2 in individuals who meet SARS-CoV-2 clinical and/or epidemiological criteria as well as from individuals without symptoms or other reasons to suspect COVID-19. Test performance for asymptomatic patients has only been established in anterior nasal swab specimens. This test is for in vitro diagnostic use under the FDA EUA for laboratories certified under CLIA to perform high complexity testing. This test has not been FDA cleared or approved. A negative result does not rule out the presence of PCR inhibitors in the specimen or target RNA concentration below the limit of detection for the assay. The possibility of a false negative should be considered if the patient's recent exposure or clinical presentation suggests COVID-19. This test was validated by [facility]. These Laboratories are certified under the Clinical Laboratory Improvement Amendments (CLIA) as qualified to perform high complexity testing.     EKG 12-lead, tracing only   Result Value Ref Range    Systolic Blood Pressure  mmHg    Diastolic Blood Pressure  mmHg    Ventricular Rate 92 BPM     Atrial Rate 92 BPM    NV Interval 154 ms    QRS Duration 92 ms     ms    QTc 484 ms    P Axis 31 degrees    R AXIS -2 degrees    T Axis 52 degrees    Interpretation ECG       Sinus rhythm  Prolonged QT  Abnormal ECG  When compared with ECG of 19-DEC-2023 07:24,  Previous ECG has undetermined rhythm, needs review     CBC with platelets differential    Narrative    The following orders were created for panel order CBC with platelets differential.  Procedure                               Abnormality         Status                     ---------                               -----------         ------                     CBC with platelets and d...[[id number]]  Abnormal            Final result                 Please view results for these tests on the individual orders.   INR   Result Value Ref Range    INR 2.84 (H) 0.85 - 1.15   Partial thromboplastin time   Result Value Ref Range    aPTT 50 (H) 22 - 38 Seconds   Fibrinogen activity   Result Value Ref Range    Fibrinogen Activity 125 (L) 170 - 490 mg/dL   HIV Antigen Antibody Combo Cascade   Result Value Ref Range    HIV Antigen Antibody Combo Nonreactive Nonreactive   Hepatitis B Surface Antibody   Result Value Ref Range    Hepatitis B Surface Antibody Nonreactive     Hepatitis B Surface Antibody Instrument Value <3.50 <8.5 m[IU]/mL   Hepatitis B core antibody   Result Value Ref Range    Hepatitis B Core Antibody Total Nonreactive Nonreactive   Hepatitis C antibody   Result Value Ref Range    Hepatitis C Antibody Nonreactive Nonreactive   Hepatitis B surface antigen   Result Value Ref Range    Hepatitis B Surface Antigen Nonreactive Nonreactive   CBC with platelets and differential   Result Value Ref Range    WBC Count 9.5 4.0 - 11.0 10e3/uL    RBC Count 2.05 (L) 4.40 - 5.90 10e6/uL    Hemoglobin 7.0 (L) 13.3 - 17.7 g/dL    Hematocrit 21.2 (L) 40.0 - 53.0 %     (H) 78 - 100 fL    MCH 34.1 (H) 26.5 - 33.0 pg    MCHC 33.0 31.5 - 36.5 g/dL    RDW 14.4 10.0 -  15.0 %    Platelet Count 81 (L) 150 - 450 10e3/uL    % Neutrophils 75 %    % Lymphocytes 10 %    % Monocytes 8 %    % Eosinophils 3 %    % Basophils 1 %    % Immature Granulocytes 3 %    NRBCs per 100 WBC 0 <1 /100    Absolute Neutrophils 7.2 1.6 - 8.3 10e3/uL    Absolute Lymphocytes 0.9 0.8 - 5.3 10e3/uL    Absolute Monocytes 0.8 0.0 - 1.3 10e3/uL    Absolute Eosinophils 0.3 0.0 - 0.7 10e3/uL    Absolute Basophils 0.1 0.0 - 0.2 10e3/uL    Absolute Immature Granulocytes 0.3 <=0.4 10e3/uL    Absolute NRBCs 0.0 10e3/uL   ABO/Rh type and screen    Narrative    The following orders were created for panel order ABO/Rh type and screen.  Procedure                               Abnormality         Status                     ---------                               -----------         ------                     Adult Type and Screen[[id number]]                            Final result                 Please view results for these tests on the individual orders.   Adult Type and Screen   Result Value Ref Range    ABO/RH(D) O POS     Antibody Screen Negative Negative    SPECIMEN EXPIRATION DATE [id number]    Comprehensive metabolic panel   Result Value Ref Range    Sodium 124 (L) 135 - 145 mmol/L    Potassium 4.8 3.4 - 5.3 mmol/L    Carbon Dioxide (CO2) 27 22 - 29 mmol/L    Anion Gap 7 7 - 15 mmol/L    Urea Nitrogen 18.9 6.0 - 20.0 mg/dL    Creatinine 1.11 0.67 - 1.17 mg/dL    GFR Estimate 80 >60 mL/min/1.73m2    Calcium 8.4 (L) 8.6 - 10.0 mg/dL    Chloride 90 (L) 98 - 107 mmol/L    Glucose 289 (H) 70 - 99 mg/dL    Alkaline Phosphatase 313 (H) 40 - 150 U/L    AST 71 (H) 0 - 45 U/L    ALT 30 0 - 70 U/L    Protein Total 6.5 6.4 - 8.3 g/dL    Albumin 2.6 (L) 3.5 - 5.2 g/dL    Bilirubin Total 8.6 (H) <=1.2 mg/dL   Magnesium level   Result Value Ref Range    Magnesium 1.7 1.7 - 2.3 mg/dL   Phosphorus level   Result Value Ref Range    Phosphorus 4.0 2.5 - 4.5 mg/dL   Amylase   Result Value Ref Range    Amylase 28 28 - 100 U/L   XR  Chest 2 Views    Impression    RESIDENT PRELIMINARY INTERPRETATION  IMPRESSION: Left basilar opacity suspicious for infection versus  atelectasis.   CT Chest Abdomen Pelvis w/o Contrast    Impression    RESIDENT PRELIMINARY INTERPRETATION  IMPRESSION:   1. Examination is significantly limited due to lack of IV contrast.  Within the limitations of the examination findings are concerning for  partial small bowel obstruction, possibly closed loop obstruction, as  evidenced by several abnormally dilated loops of small bowel localized  to the right lateral abdomen with a focal transition point  corresponding to site of swirling mesentery.  2. Ringlike gas of an involved loop of small bowel bowel, concerning  for possible early pneumatosis versus intraluminal bowel gas.  3. Trace pleural effusions with patchy bibasilar opacities suspicious  for infection/inflammation.  4. Cirrhotic morphology of the liver with small volume ascites and  splenomegaly.      [Urgent Result: Concern for small bowel obstruction    Finding was identified on 2/15/2024 10:21 PM.     Sher Casiano RN was contacted by Dr. Mi at 2/15/2024 10:49 PM  and verbalized understanding of the urgent finding.

## 2024-02-15 NOTE — TELEPHONE ENCOUNTER
"TRANSPLANT OR REPORT    Organ: Liver  Laterality (if known): NA  Organ Location: [facility] ID: [id number]  Donor OR Time: 0200 CST  Expected/Actual Cross Clamp Time: 0400  Expected Organ Arrival Time: 0700    Surgeon: Diego  Time in OR: 0600  Time in 3C (N/A for RISHABH): N/A    Recipient Details  Admission ETA: 1800  Unit: 7A  Isolation: No  Latex Allergy: No  : No  Diagnosis: ESLD    Liver Transplants  Bypass/Perfusion: Yes  Cellsaver: Yes  Hemodialysis: No  ~ \"RENAL STAFF TEACHING SERVICE MEDICINE\" : Remind LI Fellow to discuss with nephrology on call.  ~ CRRT Resource Nurse: N/A  (Telephone Number for CRRT [phone]. When prompted, the caller should say  CRRT Resource 1\")    Kidney/Panc Transplants  XM Status (Need to wait for XM?): N/A    Liver or KP/PA Recipients - Vessel Banking:  Donor has positive serologies for HIV/HCV/HBV: No  Donor has risk criteria for HIV/HCV/HBV: No      Transplant Coordinator Contact Info: Genna Laguerre      Vessel Bank Information  Transplant hospitals must not store a donor s extra vessels if the donor has tested positive for any of the following:   - HIV by antibody, antigen, or nucleic acid test (OCTAVIA)   - Hepatitis B surface antigen (HBsAg)   - Hepatitis B (HBV) by OCTAVIA   - Hepatitis C (HCV) by antibody or OCTAVIA     Extra vessels from donors that do not test positive for HIV, HBV, or HCV as above may be stored    "

## 2024-02-15 NOTE — Clinical Note
Patient made temporarily inactive due to covid infection with CT results. Plan to retest for covid in ten days and reassess for active status.

## 2024-02-16 ENCOUNTER — MYC MEDICAL ADVICE (OUTPATIENT)
Dept: INFECTIOUS DISEASES | Facility: CLINIC | Age: 54
End: 2024-02-16
Payer: COMMERCIAL

## 2024-02-16 ENCOUNTER — TELEPHONE (OUTPATIENT)
Dept: TRANSPLANT | Facility: CLINIC | Age: 54
End: 2024-02-16
Payer: COMMERCIAL

## 2024-02-16 DIAGNOSIS — U07.1 INFECTION DUE TO 2019 NOVEL CORONAVIRUS: Primary | ICD-10-CM

## 2024-02-16 LAB
ATRIAL RATE - MUSE: 92 BPM
CMV IGG SERPL IA-ACNC: <0.2 U/ML
CMV IGG SERPL IA-ACNC: NORMAL
CMV IGM SERPL IA-ACNC: <8 AU/ML
CMV IGM SERPL IA-ACNC: NEGATIVE
DIASTOLIC BLOOD PRESSURE - MUSE: NORMAL MMHG
EBV VCA IGG SER IA-ACNC: >750 U/ML
EBV VCA IGG SER IA-ACNC: POSITIVE
EBV VCA IGM SER IA-ACNC: 27.9 U/ML
EBV VCA IGM SER IA-ACNC: NORMAL
INTERPRETATION ECG - MUSE: NORMAL
LABORATORY REPORT: NORMAL
P AXIS - MUSE: 31 DEGREES
PETH INTERPRETATION: NORMAL
PLPETH BLD-MCNC: <10 NG/ML
POPETH BLD-MCNC: <10 NG/ML
PR INTERVAL - MUSE: 154 MS
QRS DURATION - MUSE: 92 MS
QT - MUSE: 392 MS
QTC - MUSE: 484 MS
R AXIS - MUSE: -2 DEGREES
RADIOLOGIST FLAGS: ABNORMAL
SYSTOLIC BLOOD PRESSURE - MUSE: NORMAL MMHG
T AXIS - MUSE: 52 DEGREES
VENTRICULAR RATE- MUSE: 92 BPM

## 2024-02-16 RX ORDER — HYDROCODONE BITARTRATE AND ACETAMINOPHEN 5; 325 MG/1; MG/1
1 TABLET ORAL EVERY 12 HOURS PRN
Qty: 20 TABLET | Refills: 0 | Status: ON HOLD | OUTPATIENT
Start: 2024-02-16 | End: 2024-03-02

## 2024-02-16 ASSESSMENT — ACTIVITIES OF DAILY LIVING (ADL): ADLS_ACUITY_SCORE: 23

## 2024-02-16 NOTE — TELEPHONE ENCOUNTER
Spoke with wife about - Covid + - inactive on list - retest 2/24    She was aware and appreciated call.    Plan is for him to get labs again this Sunday or Monday (2/18 or 2/19) to check on Hgb and Na+.    Covid test on Friday 2/23 with labs too.    If needed, Monday 2/26 labs and covid

## 2024-02-16 NOTE — TELEPHONE ENCOUNTER
"Organ Offer Encounter Information    Organ Offer Information  Organ offer date & time: 2/15/2024 11:15 AM  Coordinator/Fellow/Attending name: Evelyn Clemens RN   Organ(s):  Organ UNOS ID Match Run ID Comment Organ Laterality   Liver [id number] MWOB         Recent infections?: No      New medications?: Yes (Comment: Rifaxin, folic acid) Recent pregnancy?: No     Angicoagulation medications?: No Recent vaccinations?: Yes     Recent blood transfusions?: No Recent hospitalizations?: Yes (Comment: January 2024)   Has your insurance changed in the last 6-12 months?: Neg    Discussed organ offer with: Patient, Spouse  Discussed risk category with Patient/Other: N/A  Understood donor criteria, verbalized understanding  Patient/Other asked to speak to a surgeon?: No  Discussed program-specific outcomes: Did not have questions regarding SRTR  Right to decline organ offer without penalty, Patient/Other: Aware of option to decline without penalty  Organ offer decision status Patient/Other: Accepted Offer  Organ disposition: Case Cancelled - Recipient medical condition  Additional Comments: 2/15/2024 11:20 AM  Liver: Import, DBD  MD: Diego  [facility] Contact: [phone]  Donor/Recip HCV Status: Donor NEG  (HCV+ Donors - Discuss HCV genotyping/quant testing with MD & send Epic in basket message to [facility] HCV POOL - Include Donor UNOS ID)  Donor Nutritional Status: No TF since yesterday AM, requested IVF be switched to include dextrose  Local or Import Donor: Import  (For import cases, utilize the \"TRANSPORTSETUP\" smart phrase to arrange transportation)  Using [employer] OCS: No  (For OCS Cases, utilize the \"OCSLIVER\" smart phrase)  Plan (NPO, Donor OR): Called patient to discuss possible LI offer.  Health assessment as above.  Donor OR time TBD, spoke to Bridgette at [facility] to request local recovery with intra-op Bx (both needle and wedge of R and L lobes), as well as 0200 donor OR time.  Awaiting confirmation on timing. "    - - -   COVID Screening  In the past month, have you:  Or anyone close to you had a positive COVID test or suspected to have COVID:  No  Had any COVID symptoms (Fever, Cough, Short of Breath, Loss of Taste/Smell, Rash): No  - - -  Admissions: 1151 - [place], ETA 1930  Unit: 1150 - [place] 7A, no beds available currently - will work on alternative unit  Update Provider Entering Orders (XM Plan & COVID Testing):  PIYUSH Lunsford notified of admission (does not need XM)  Immunology: Zohaib5 - Lash[place] notified of admission pending this afternoon  Inpatient Lab (COVID Testing [phone], Option 2): MD to order STAT  KIDNEY Research ([phone]): N/A  Genna Campos RN   Transplant Coordinator    February 15, 2024 12:45 PM  Per [facility], will have a bed this afternoon.  Spoke to Rosio (wife) to update on bed situation.  Per Bridgette at [facility],  talking to family regarding 0200 donor OR time request.  Genna Campos RN   Transplant Coordinator      February 15, 2024 1:22 PM  Donor OR confirmed for 0200 per Bridgette ([phone]) at [facility].  Donor OR Time: 0200  Procuring MD: Dion Hernandez  Contact in the OR: TBD  Organs Being Procured: RISHABH and EVIE  Expected Delays: No  Flush Solution: Servator H  Biopsy: Requested R and L wedge and needle biopsies  Pump: No  Special Requests (Special blood tubes, nodes, waivers-XM and/or anatomical): None   MD for Visualization: Diego  Transportation Details: TBD - Request made to [place]  - - -  Call [facility] ([phone]): 1173, [place]  Organ or Specimen ([employer] sets up KP/KI): liver  Donor Hospital Location: [facility], [place], [place]  Donor OR Time: 0200  Team or Organ Only: Organ Only  Special Equipment (OCS, Pumps): none  Transplant Recovery Pros (See Call Schedule): N/A, local recovery  - - -  Book OR: 1313 - [place], OR booked for 0600  Vessel Storage Confirmation (PA/ROYA/RISHABH): Ok to bank, confirmed w/[facility]  Blood Bank: 1312,  [place]  TransNet/ABO Verification: 1313 - [place] confirmed receiving paperwork  Add Organ: 1303, organ added  Evelyn Clemens RN    5:37 PM  Final transportation per below.  will be onsite at 0300. Plane in position at 0200. MD updated on timing. [facility] coordinator updated with transport plan.    [place] Trip Number: [id number]   Location: n/a local recovery  Ground Time in [place]: 25 mn  Flight Time: 2 hours 20 mn  Ground Time at [facility]: 25  Door 2 Door: 3 hrs 10 mn  Tail Number: [id number]  Evelyn Clemens RN    Per Dr. Cortez, transplant was cancelled due to covid results and CT images. MD to speak to patient.  Updated Bon[place] on unit.  Updated OR.  Updated [place]ry in blood bank. Organ removed from record. Updated immunology.     Patient made temporarily inactive. Plan to retest for covid in 10 days to reassess reactivation.   Evelyn Clemens RN                     Attestation I have discussed all of the above with the Patient/Legal Guardian/Caregiver regarding this organ offer.: Yes  Coordinator/Fellow/Attending name: Krissy Campos, BRIDGET Clemens, RN

## 2024-02-16 NOTE — CONSULTS
Called about transplant clearance the night of 2/15. Mr. Hernandez presented on 2/15 for a liver transplant on 2/16 in the AM. It was relayed to me by the transplant surgery fellow that the donor was also covid + and likely passed away from COVID. We initially had agreed to proceed with this donor earlier in the day before a recipient was identified since the risk of transmission from a non-lung SOT is lower.    As for this recipient the pre-operative COVID test was positive with a ct value of 43.6. Asymptomatic per team. COVID vaccine x 4 (last 12/22/23). No other recent COVID test. 2/6/24 CT demonstrated atelectasis but no radiographic evidence of groundglass opacities c/w COVID. 2/15/24 CT chest w/o contrast w/ bilateral small amount of bibasilar opacities R>L. The 2/15 CT findings are not extensive but they were not present per my review on 2/6/24.  I discussed the case with Dr. Cortez and the transplant fellow. The options would include deferring this transplant and retesting patient in days 10 days prior to relisting vs proceeding with transplant and giving remdesivir post transplant. The first option is the most conservative and safest route to proceeding with transplant. Based on my discussion with transplant team the transplant is not emergent. Since there are some radiographic changes and we aren't clear where in his course of COVID he is I would recommend deferring the transplant, retest in 10 days, and then re-activate on the list.      Cassie Guajardo  Infectious Diseases

## 2024-02-16 NOTE — PROGRESS NOTES
Admitted/transferred from: home  2 RN full   skin assessment completed by Yeni Minor and Sher Casiano, JUAREZ, RN.  Skin assessment finding: issues found BLE +1 pitting edema, flaky skin, jaundice.    Interventions/actions: other none needed      Will continue to monitor.

## 2024-02-16 NOTE — PROGRESS NOTES
Transplant cancelled due to COVID test positive, and  ID recommends, placing patient on hold and retesting in 10 days to reasses  Spoke to patient and explained above clinical details

## 2024-02-16 NOTE — PROVIDER NOTIFICATION
Notified MD Cortez that pt is covid positive; per MD, team to order cycle threshold labs. Oncall resident MD Murillo paged to order labs.

## 2024-02-16 NOTE — TELEPHONE ENCOUNTER
Patient having quite a bit of pain in the shoulder.  Pain not adequately controlled so agreed to prescribe hydrocodone 5 mg every 12 hours as needed.  20 tablet prescription written.

## 2024-02-16 NOTE — PLAN OF CARE
Goal Outcome Evaluation:    Patient admitted to  this shift for liver transplant. Initial assessment and 2 nurse skin check completed. Urine sample, VRE swab and Covid swab collected and sent to lab. Covid returned positive, MD aware. Down for xray and CT scan. OR time tentatively set for 6 a.m. Needs CHG showers. Wife at bedside supportive of patient.     ,

## 2024-02-17 LAB
BACTERIA SPEC CULT: NORMAL
BACTERIA UR CULT: NORMAL

## 2024-02-18 ENCOUNTER — MYC MEDICAL ADVICE (OUTPATIENT)
Dept: EDUCATION SERVICES | Facility: CLINIC | Age: 54
End: 2024-02-18

## 2024-02-18 ENCOUNTER — LAB (OUTPATIENT)
Dept: LAB | Facility: CLINIC | Age: 54
End: 2024-02-18
Payer: COMMERCIAL

## 2024-02-18 DIAGNOSIS — K74.60 DECOMPENSATED HEPATIC CIRRHOSIS (H): ICD-10-CM

## 2024-02-18 DIAGNOSIS — E11.9 TYPE 2 DIABETES MELLITUS WITHOUT COMPLICATION, WITHOUT LONG-TERM CURRENT USE OF INSULIN (H): ICD-10-CM

## 2024-02-18 DIAGNOSIS — K72.90 DECOMPENSATED HEPATIC CIRRHOSIS (H): ICD-10-CM

## 2024-02-18 DIAGNOSIS — E87.1 HYPONATREMIA: ICD-10-CM

## 2024-02-18 DIAGNOSIS — D69.6 THROMBOCYTOPENIA (H): ICD-10-CM

## 2024-02-18 DIAGNOSIS — D64.9 ANEMIA, UNSPECIFIED TYPE: ICD-10-CM

## 2024-02-18 DIAGNOSIS — K70.11 ALCOHOLIC HEPATITIS WITH ASCITES (H): ICD-10-CM

## 2024-02-18 LAB
ANION GAP SERPL CALCULATED.3IONS-SCNC: 9 MMOL/L (ref 7–15)
BUN SERPL-MCNC: 22 MG/DL (ref 6–20)
CALCIUM SERPL-MCNC: 8.4 MG/DL (ref 8.6–10)
CHLORIDE SERPL-SCNC: 90 MMOL/L (ref 98–107)
CREAT SERPL-MCNC: 1.21 MG/DL (ref 0.67–1.17)
DEPRECATED HCO3 PLAS-SCNC: 24 MMOL/L (ref 22–29)
EGFRCR SERPLBLD CKD-EPI 2021: 72 ML/MIN/1.73M2
GLUCOSE SERPL-MCNC: 286 MG/DL (ref 70–99)
POTASSIUM SERPL-SCNC: 4.3 MMOL/L (ref 3.4–5.3)
SODIUM SERPL-SCNC: 123 MMOL/L (ref 135–145)

## 2024-02-18 PROCEDURE — 36415 COLL VENOUS BLD VENIPUNCTURE: CPT

## 2024-02-18 PROCEDURE — 80048 BASIC METABOLIC PNL TOTAL CA: CPT

## 2024-02-18 PROCEDURE — 85025 COMPLETE CBC W/AUTO DIFF WBC: CPT

## 2024-02-19 LAB
BASOPHILS # BLD AUTO: 0.1 10E3/UL (ref 0–0.2)
BASOPHILS NFR BLD AUTO: 1 %
EOSINOPHIL # BLD AUTO: 0.4 10E3/UL (ref 0–0.7)
EOSINOPHIL NFR BLD AUTO: 2 %
ERYTHROCYTE [DISTWIDTH] IN BLOOD BY AUTOMATED COUNT: 14.5 % (ref 10–15)
HBV DNA SERPL QL NAA+PROBE: NORMAL
HCT VFR BLD AUTO: 22.2 % (ref 40–53)
HCV RNA SERPL QL NAA+PROBE: NORMAL
HGB BLD-MCNC: 7.2 G/DL (ref 13.3–17.7)
HIV1+2 RNA SERPL QL NAA+PROBE: NORMAL
IMM GRANULOCYTES # BLD: 0.4 10E3/UL
IMM GRANULOCYTES NFR BLD: 2 %
LYMPHOCYTES # BLD AUTO: 1 10E3/UL (ref 0.8–5.3)
LYMPHOCYTES NFR BLD AUTO: 6 %
MCH RBC QN AUTO: 34.1 PG (ref 26.5–33)
MCHC RBC AUTO-ENTMCNC: 32.4 G/DL (ref 31.5–36.5)
MCV RBC AUTO: 105 FL (ref 78–100)
MONOCYTES # BLD AUTO: 1.5 10E3/UL (ref 0–1.3)
MONOCYTES NFR BLD AUTO: 9 %
NEUTROPHILS # BLD AUTO: 13.1 10E3/UL (ref 1.6–8.3)
NEUTROPHILS NFR BLD AUTO: 80 %
NRBC # BLD AUTO: 0 10E3/UL
NRBC BLD AUTO-RTO: 0 /100
PLATELET # BLD AUTO: 85 10E3/UL (ref 150–450)
RBC # BLD AUTO: 2.11 10E6/UL (ref 4.4–5.9)
WBC # BLD AUTO: 16.4 10E3/UL (ref 4–11)

## 2024-02-19 RX ORDER — METFORMIN HCL 500 MG
500 TABLET, EXTENDED RELEASE 24 HR ORAL
Qty: 90 TABLET | Refills: 3 | Status: ON HOLD | OUTPATIENT
Start: 2024-02-19 | End: 2024-03-02

## 2024-02-20 ENCOUNTER — TELEPHONE (OUTPATIENT)
Dept: ENDOCRINOLOGY | Facility: CLINIC | Age: 54
End: 2024-02-20

## 2024-02-23 ENCOUNTER — VIRTUAL VISIT (OUTPATIENT)
Dept: EDUCATION SERVICES | Facility: CLINIC | Age: 54
End: 2024-02-23
Payer: COMMERCIAL

## 2024-02-23 ENCOUNTER — APPOINTMENT (OUTPATIENT)
Dept: GENERAL RADIOLOGY | Facility: CLINIC | Age: 54
DRG: 871 | End: 2024-02-23
Attending: EMERGENCY MEDICINE
Payer: COMMERCIAL

## 2024-02-23 ENCOUNTER — HOSPITAL ENCOUNTER (INPATIENT)
Facility: CLINIC | Age: 54
LOS: 8 days | Discharge: HOME HEALTH | DRG: 871 | End: 2024-03-02
Attending: EMERGENCY MEDICINE | Admitting: STUDENT IN AN ORGANIZED HEALTH CARE EDUCATION/TRAINING PROGRAM
Payer: COMMERCIAL

## 2024-02-23 ENCOUNTER — APPOINTMENT (OUTPATIENT)
Dept: CT IMAGING | Facility: CLINIC | Age: 54
DRG: 871 | End: 2024-02-23
Attending: EMERGENCY MEDICINE
Payer: COMMERCIAL

## 2024-02-23 ENCOUNTER — MYC MEDICAL ADVICE (OUTPATIENT)
Dept: FAMILY MEDICINE | Facility: CLINIC | Age: 54
End: 2024-02-23

## 2024-02-23 DIAGNOSIS — K72.90 DECOMPENSATED HEPATIC CIRRHOSIS (H): ICD-10-CM

## 2024-02-23 DIAGNOSIS — B95.5 STREPTOCOCCAL BACTEREMIA: ICD-10-CM

## 2024-02-23 DIAGNOSIS — A41.9 SEPSIS WITH ACUTE LIVER FAILURE AND SEPTIC SHOCK WITHOUT HEPATIC COMA, DUE TO UNSPECIFIED ORGANISM (H): ICD-10-CM

## 2024-02-23 DIAGNOSIS — K72.00 SEPSIS WITH ACUTE LIVER FAILURE AND SEPTIC SHOCK WITHOUT HEPATIC COMA, DUE TO UNSPECIFIED ORGANISM (H): ICD-10-CM

## 2024-02-23 DIAGNOSIS — Z76.82 LIVER TRANSPLANT CANDIDATE: ICD-10-CM

## 2024-02-23 DIAGNOSIS — Z79.4 TYPE 2 DIABETES MELLITUS WITH HYPERGLYCEMIA, WITH LONG-TERM CURRENT USE OF INSULIN (H): ICD-10-CM

## 2024-02-23 DIAGNOSIS — E11.65 TYPE 2 DIABETES MELLITUS WITH HYPERGLYCEMIA, WITH LONG-TERM CURRENT USE OF INSULIN (H): Primary | ICD-10-CM

## 2024-02-23 DIAGNOSIS — K74.60 DECOMPENSATED HEPATIC CIRRHOSIS (H): ICD-10-CM

## 2024-02-23 DIAGNOSIS — E11.65 TYPE 2 DIABETES MELLITUS WITH HYPERGLYCEMIA, WITH LONG-TERM CURRENT USE OF INSULIN (H): ICD-10-CM

## 2024-02-23 DIAGNOSIS — R78.81 STREPTOCOCCAL BACTEREMIA: ICD-10-CM

## 2024-02-23 DIAGNOSIS — N17.9 AKI (ACUTE KIDNEY INJURY) (H): ICD-10-CM

## 2024-02-23 DIAGNOSIS — E43 SEVERE MALNUTRITION (H): ICD-10-CM

## 2024-02-23 DIAGNOSIS — K72.00 ACUTE LIVER FAILURE WITHOUT HEPATIC COMA: ICD-10-CM

## 2024-02-23 DIAGNOSIS — M62.81 GENERALIZED MUSCLE WEAKNESS: ICD-10-CM

## 2024-02-23 DIAGNOSIS — Z79.4 TYPE 2 DIABETES MELLITUS WITH HYPERGLYCEMIA, WITH LONG-TERM CURRENT USE OF INSULIN (H): Primary | ICD-10-CM

## 2024-02-23 DIAGNOSIS — R65.21 SEPSIS WITH ACUTE LIVER FAILURE AND SEPTIC SHOCK WITHOUT HEPATIC COMA, DUE TO UNSPECIFIED ORGANISM (H): ICD-10-CM

## 2024-02-23 DIAGNOSIS — R53.81 PHYSICAL DECONDITIONING: ICD-10-CM

## 2024-02-23 DIAGNOSIS — G47.00 PERSISTENT INSOMNIA: Chronic | ICD-10-CM

## 2024-02-23 DIAGNOSIS — Z11.52 ENCOUNTER FOR SCREENING FOR COVID-19: Primary | ICD-10-CM

## 2024-02-23 LAB
ABO/RH(D): NORMAL
ALBUMIN SERPL BCG-MCNC: 2.4 G/DL (ref 3.5–5.2)
ALBUMIN UR-MCNC: NEGATIVE MG/DL
ALP SERPL-CCNC: 191 U/L (ref 40–150)
ALT SERPL W P-5'-P-CCNC: 25 U/L (ref 0–70)
AMMONIA PLAS-SCNC: 64 UMOL/L (ref 16–60)
ANION GAP SERPL CALCULATED.3IONS-SCNC: 11 MMOL/L (ref 7–15)
ANTIBODY SCREEN: NEGATIVE
APPEARANCE UR: CLEAR
APTT PPP: 66 SECONDS (ref 22–38)
AST SERPL W P-5'-P-CCNC: 56 U/L (ref 0–45)
BASE EXCESS BLDV CALC-SCNC: -1.1 MMOL/L (ref -3–3)
BASOPHILS # BLD AUTO: 0.1 10E3/UL (ref 0–0.2)
BASOPHILS NFR BLD AUTO: 0 %
BILIRUB DIRECT SERPL-MCNC: 6.66 MG/DL (ref 0–0.3)
BILIRUB SERPL-MCNC: 11.1 MG/DL
BILIRUB UR QL STRIP: ABNORMAL
BLD PROD TYP BPU: NORMAL
BLOOD COMPONENT TYPE: NORMAL
BUN SERPL-MCNC: 35.5 MG/DL (ref 6–20)
CALCIUM SERPL-MCNC: 8.2 MG/DL (ref 8.6–10)
CHLORIDE SERPL-SCNC: 91 MMOL/L (ref 98–107)
CODING SYSTEM: NORMAL
COLOR UR AUTO: YELLOW
CREAT SERPL-MCNC: 2.51 MG/DL (ref 0.67–1.17)
CROSSMATCH: NORMAL
DEPRECATED HCO3 PLAS-SCNC: 21 MMOL/L (ref 22–29)
EGFRCR SERPLBLD CKD-EPI 2021: 30 ML/MIN/1.73M2
ENTEROCOCCUS FAECALIS: NOT DETECTED
ENTEROCOCCUS FAECIUM: NOT DETECTED
EOSINOPHIL # BLD AUTO: 0.2 10E3/UL (ref 0–0.7)
EOSINOPHIL NFR BLD AUTO: 1 %
ERYTHROCYTE [DISTWIDTH] IN BLOOD BY AUTOMATED COUNT: 14.9 % (ref 10–15)
ERYTHROCYTE [DISTWIDTH] IN BLOOD BY AUTOMATED COUNT: 15.3 % (ref 10–15)
FLUAV RNA SPEC QL NAA+PROBE: NEGATIVE
FLUBV RNA RESP QL NAA+PROBE: NEGATIVE
GLUCOSE BLDC GLUCOMTR-MCNC: 140 MG/DL (ref 70–99)
GLUCOSE BLDC GLUCOMTR-MCNC: 145 MG/DL (ref 70–99)
GLUCOSE BLDC GLUCOMTR-MCNC: 169 MG/DL (ref 70–99)
GLUCOSE BLDC GLUCOMTR-MCNC: 232 MG/DL (ref 70–99)
GLUCOSE BLDC GLUCOMTR-MCNC: 255 MG/DL (ref 70–99)
GLUCOSE SERPL-MCNC: 141 MG/DL (ref 70–99)
GLUCOSE UR STRIP-MCNC: NEGATIVE MG/DL
HCO3 BLDV-SCNC: 24 MMOL/L (ref 21–28)
HCT VFR BLD AUTO: 20.4 % (ref 40–53)
HCT VFR BLD AUTO: 21.8 % (ref 40–53)
HGB BLD-MCNC: 6.6 G/DL (ref 13.3–17.7)
HGB BLD-MCNC: 7.7 G/DL (ref 13.3–17.7)
HGB UR QL STRIP: NEGATIVE
HYALINE CASTS: 2 /LPF
IMM GRANULOCYTES # BLD: 1.2 10E3/UL
IMM GRANULOCYTES NFR BLD: 4 %
INR PPP: 4.18 (ref 0.85–1.15)
INR PPP: 4.89 (ref 0.85–1.15)
ISSUE DATE AND TIME: NORMAL
KETONES UR STRIP-MCNC: NEGATIVE MG/DL
LACTATE SERPL-SCNC: 3.1 MMOL/L (ref 0.7–2)
LACTATE SERPL-SCNC: 3.5 MMOL/L (ref 0.7–2)
LACTATE SERPL-SCNC: 4.5 MMOL/L (ref 0.7–2)
LEUKOCYTE ESTERASE UR QL STRIP: NEGATIVE
LISTERIA SPECIES (DETECTED/NOT DETECTED): NOT DETECTED
LYMPHOCYTES # BLD AUTO: 0.8 10E3/UL (ref 0.8–5.3)
LYMPHOCYTES NFR BLD AUTO: 3 %
MCH RBC QN AUTO: 34 PG (ref 26.5–33)
MCH RBC QN AUTO: 35 PG (ref 26.5–33)
MCHC RBC AUTO-ENTMCNC: 32.4 G/DL (ref 31.5–36.5)
MCHC RBC AUTO-ENTMCNC: 35.3 G/DL (ref 31.5–36.5)
MCV RBC AUTO: 105 FL (ref 78–100)
MCV RBC AUTO: 99 FL (ref 78–100)
MONOCYTES # BLD AUTO: 1.5 10E3/UL (ref 0–1.3)
MONOCYTES NFR BLD AUTO: 5 %
MRSA DNA SPEC QL NAA+PROBE: NEGATIVE
MUCOUS THREADS #/AREA URNS LPF: PRESENT /LPF
NEUTROPHILS # BLD AUTO: 24.8 10E3/UL (ref 1.6–8.3)
NEUTROPHILS NFR BLD AUTO: 87 %
NITRATE UR QL: NEGATIVE
NRBC # BLD AUTO: 0 10E3/UL
NRBC BLD AUTO-RTO: 0 /100
O2/TOTAL GAS SETTING VFR VENT: 23 %
OXYHGB MFR BLDV: 53 % (ref 70–75)
PCO2 BLDV: 41 MM HG (ref 40–50)
PH BLDV: 7.38 [PH] (ref 7.32–7.43)
PH UR STRIP: 5 [PH] (ref 5–7)
PLATELET # BLD AUTO: 120 10E3/UL (ref 150–450)
PLATELET # BLD AUTO: 95 10E3/UL (ref 150–450)
PO2 BLDV: 30 MM HG (ref 25–47)
POTASSIUM SERPL-SCNC: 5.5 MMOL/L (ref 3.4–5.3)
PROCALCITONIN SERPL IA-MCNC: 1.97 NG/ML
PROT SERPL-MCNC: 6.3 G/DL (ref 6.4–8.3)
RBC # BLD AUTO: 1.94 10E6/UL (ref 4.4–5.9)
RBC # BLD AUTO: 2.2 10E6/UL (ref 4.4–5.9)
RBC URINE: <1 /HPF
RSV RNA SPEC NAA+PROBE: NEGATIVE
SA TARGET DNA: NEGATIVE
SAO2 % BLDV: 54.5 % (ref 70–75)
SARS-COV-2 RNA RESP QL NAA+PROBE: NEGATIVE
SODIUM SERPL-SCNC: 123 MMOL/L (ref 135–145)
SP GR UR STRIP: 1.01 (ref 1–1.03)
SPECIMEN EXPIRATION DATE: NORMAL
STAPHYLOCOCCUS AUREUS: NOT DETECTED
STAPHYLOCOCCUS EPIDERMIDIS: NOT DETECTED
STAPHYLOCOCCUS LUGDUNENSIS: NOT DETECTED
STAPHYLOCOCCUS SPECIES: NOT DETECTED
STREPTOCOCCUS AGALACTIAE: NOT DETECTED
STREPTOCOCCUS ANGINOSUS GROUP: NOT DETECTED
STREPTOCOCCUS PNEUMONIAE: NOT DETECTED
STREPTOCOCCUS PYOGENES: NOT DETECTED
STREPTOCOCCUS SPECIES: NOT DETECTED
UNIT ABO/RH: NORMAL
UNIT NUMBER: NORMAL
UNIT STATUS: NORMAL
UNIT TYPE ISBT: 5100
UROBILINOGEN UR STRIP-MCNC: NORMAL MG/DL
WBC # BLD AUTO: 28.6 10E3/UL (ref 4–11)
WBC # BLD AUTO: 42.6 10E3/UL (ref 4–11)
WBC URINE: <1 /HPF

## 2024-02-23 PROCEDURE — 258N000003 HC RX IP 258 OP 636: Performed by: EMERGENCY MEDICINE

## 2024-02-23 PROCEDURE — 87640 STAPH A DNA AMP PROBE: CPT

## 2024-02-23 PROCEDURE — 85730 THROMBOPLASTIN TIME PARTIAL: CPT | Performed by: EMERGENCY MEDICINE

## 2024-02-23 PROCEDURE — 36556 INSERT NON-TUNNEL CV CATH: CPT | Performed by: EMERGENCY MEDICINE

## 2024-02-23 PROCEDURE — 87186 SC STD MICRODIL/AGAR DIL: CPT | Performed by: EMERGENCY MEDICINE

## 2024-02-23 PROCEDURE — P9016 RBC LEUKOCYTES REDUCED: HCPCS | Performed by: EMERGENCY MEDICINE

## 2024-02-23 PROCEDURE — 3E043XZ INTRODUCTION OF VASOPRESSOR INTO CENTRAL VEIN, PERCUTANEOUS APPROACH: ICD-10-PCS | Performed by: STUDENT IN AN ORGANIZED HEALTH CARE EDUCATION/TRAINING PROGRAM

## 2024-02-23 PROCEDURE — 86900 BLOOD TYPING SEROLOGIC ABO: CPT | Performed by: EMERGENCY MEDICINE

## 2024-02-23 PROCEDURE — 82805 BLOOD GASES W/O2 SATURATION: CPT | Performed by: EMERGENCY MEDICINE

## 2024-02-23 PROCEDURE — 87637 SARSCOV2&INF A&B&RSV AMP PRB: CPT | Performed by: EMERGENCY MEDICINE

## 2024-02-23 PROCEDURE — 99291 CRITICAL CARE FIRST HOUR: CPT | Mod: 25 | Performed by: EMERGENCY MEDICINE

## 2024-02-23 PROCEDURE — 250N000011 HC RX IP 250 OP 636

## 2024-02-23 PROCEDURE — 87149 DNA/RNA DIRECT PROBE: CPT | Performed by: EMERGENCY MEDICINE

## 2024-02-23 PROCEDURE — 99291 CRITICAL CARE FIRST HOUR: CPT | Mod: GC | Performed by: STUDENT IN AN ORGANIZED HEALTH CARE EDUCATION/TRAINING PROGRAM

## 2024-02-23 PROCEDURE — 82140 ASSAY OF AMMONIA: CPT | Performed by: EMERGENCY MEDICINE

## 2024-02-23 PROCEDURE — 74176 CT ABD & PELVIS W/O CONTRAST: CPT

## 2024-02-23 PROCEDURE — 84145 PROCALCITONIN (PCT): CPT | Performed by: EMERGENCY MEDICINE

## 2024-02-23 PROCEDURE — 82040 ASSAY OF SERUM ALBUMIN: CPT | Performed by: EMERGENCY MEDICINE

## 2024-02-23 PROCEDURE — 99207 PR NO BILLABLE SERVICE THIS VISIT: CPT | Mod: 95 | Performed by: NUTRITIONIST

## 2024-02-23 PROCEDURE — 250N000013 HC RX MED GY IP 250 OP 250 PS 637

## 2024-02-23 PROCEDURE — 250N000009 HC RX 250: Performed by: EMERGENCY MEDICINE

## 2024-02-23 PROCEDURE — 74176 CT ABD & PELVIS W/O CONTRAST: CPT | Mod: 26 | Performed by: RADIOLOGY

## 2024-02-23 PROCEDURE — 86923 COMPATIBILITY TEST ELECTRIC: CPT

## 2024-02-23 PROCEDURE — 71045 X-RAY EXAM CHEST 1 VIEW: CPT | Mod: 26 | Performed by: RADIOLOGY

## 2024-02-23 PROCEDURE — 99223 1ST HOSP IP/OBS HIGH 75: CPT | Performed by: NURSE PRACTITIONER

## 2024-02-23 PROCEDURE — 250N000013 HC RX MED GY IP 250 OP 250 PS 637: Performed by: EMERGENCY MEDICINE

## 2024-02-23 PROCEDURE — 87641 MR-STAPH DNA AMP PROBE: CPT

## 2024-02-23 PROCEDURE — 99285 EMERGENCY DEPT VISIT HI MDM: CPT | Mod: 25 | Performed by: EMERGENCY MEDICINE

## 2024-02-23 PROCEDURE — 999N000065 XR CHEST PORT 1 VIEW

## 2024-02-23 PROCEDURE — 82962 GLUCOSE BLOOD TEST: CPT

## 2024-02-23 PROCEDURE — 96375 TX/PRO/DX INJ NEW DRUG ADDON: CPT | Performed by: EMERGENCY MEDICINE

## 2024-02-23 PROCEDURE — 96368 THER/DIAG CONCURRENT INF: CPT | Performed by: EMERGENCY MEDICINE

## 2024-02-23 PROCEDURE — 86923 COMPATIBILITY TEST ELECTRIC: CPT | Performed by: EMERGENCY MEDICINE

## 2024-02-23 PROCEDURE — 83605 ASSAY OF LACTIC ACID: CPT

## 2024-02-23 PROCEDURE — 85025 COMPLETE CBC W/AUTO DIFF WBC: CPT | Performed by: EMERGENCY MEDICINE

## 2024-02-23 PROCEDURE — 200N000002 HC R&B ICU UMMC

## 2024-02-23 PROCEDURE — 96365 THER/PROPH/DIAG IV INF INIT: CPT | Performed by: EMERGENCY MEDICINE

## 2024-02-23 PROCEDURE — 36415 COLL VENOUS BLD VENIPUNCTURE: CPT | Performed by: EMERGENCY MEDICINE

## 2024-02-23 PROCEDURE — 85610 PROTHROMBIN TIME: CPT | Performed by: EMERGENCY MEDICINE

## 2024-02-23 PROCEDURE — 80053 COMPREHEN METABOLIC PANEL: CPT | Performed by: EMERGENCY MEDICINE

## 2024-02-23 PROCEDURE — 85027 COMPLETE CBC AUTOMATED: CPT

## 2024-02-23 PROCEDURE — 71045 X-RAY EXAM CHEST 1 VIEW: CPT

## 2024-02-23 PROCEDURE — 81001 URINALYSIS AUTO W/SCOPE: CPT | Performed by: EMERGENCY MEDICINE

## 2024-02-23 PROCEDURE — 83605 ASSAY OF LACTIC ACID: CPT | Performed by: EMERGENCY MEDICINE

## 2024-02-23 PROCEDURE — 250N000009 HC RX 250

## 2024-02-23 PROCEDURE — 250N000011 HC RX IP 250 OP 636: Performed by: EMERGENCY MEDICINE

## 2024-02-23 RX ORDER — LIDOCAINE HYDROCHLORIDE AND EPINEPHRINE 10; 10 MG/ML; UG/ML
INJECTION, SOLUTION INFILTRATION; PERINEURAL
Status: DISCONTINUED
Start: 2024-02-23 | End: 2024-02-23 | Stop reason: HOSPADM

## 2024-02-23 RX ORDER — NICOTINE POLACRILEX 4 MG
15-30 LOZENGE BUCCAL
Status: DISCONTINUED | OUTPATIENT
Start: 2024-02-23 | End: 2024-02-25

## 2024-02-23 RX ORDER — THIAMINE HYDROCHLORIDE 100 MG/ML
250 INJECTION, SOLUTION INTRAMUSCULAR; INTRAVENOUS DAILY
Status: COMPLETED | OUTPATIENT
Start: 2024-02-26 | End: 2024-03-01

## 2024-02-23 RX ORDER — CEFEPIME HYDROCHLORIDE 2 G/1
2 INJECTION, POWDER, FOR SOLUTION INTRAVENOUS EVERY 12 HOURS
Status: DISCONTINUED | OUTPATIENT
Start: 2024-02-23 | End: 2024-02-25

## 2024-02-23 RX ORDER — DEXTROSE MONOHYDRATE 25 G/50ML
25-50 INJECTION, SOLUTION INTRAVENOUS
Status: DISCONTINUED | OUTPATIENT
Start: 2024-02-23 | End: 2024-02-25

## 2024-02-23 RX ORDER — ONDANSETRON 4 MG/1
4 TABLET, ORALLY DISINTEGRATING ORAL EVERY 6 HOURS PRN
Status: DISCONTINUED | OUTPATIENT
Start: 2024-02-23 | End: 2024-03-02 | Stop reason: HOSPADM

## 2024-02-23 RX ORDER — THIAMINE HYDROCHLORIDE 100 MG/ML
500 INJECTION, SOLUTION INTRAMUSCULAR; INTRAVENOUS 3 TIMES DAILY
Status: COMPLETED | OUTPATIENT
Start: 2024-02-23 | End: 2024-02-25

## 2024-02-23 RX ORDER — ACETAMINOPHEN 500 MG
1000 TABLET ORAL ONCE
Status: COMPLETED | OUTPATIENT
Start: 2024-02-23 | End: 2024-02-23

## 2024-02-23 RX ORDER — CEFEPIME HYDROCHLORIDE 2 G/1
2 INJECTION, POWDER, FOR SOLUTION INTRAVENOUS EVERY 8 HOURS
Status: DISCONTINUED | OUTPATIENT
Start: 2024-02-23 | End: 2024-02-23

## 2024-02-23 RX ORDER — DEXTROSE MONOHYDRATE 25 G/50ML
25-50 INJECTION, SOLUTION INTRAVENOUS
Status: DISCONTINUED | OUTPATIENT
Start: 2024-02-23 | End: 2024-02-23

## 2024-02-23 RX ORDER — NICOTINE POLACRILEX 4 MG
15-30 LOZENGE BUCCAL
Status: DISCONTINUED | OUTPATIENT
Start: 2024-02-23 | End: 2024-02-23

## 2024-02-23 RX ORDER — THIAMINE HYDROCHLORIDE 100 MG/ML
100 INJECTION, SOLUTION INTRAMUSCULAR; INTRAVENOUS DAILY
Status: DISCONTINUED | OUTPATIENT
Start: 2024-02-23 | End: 2024-02-23

## 2024-02-23 RX ORDER — FOLIC ACID 1 MG/1
1 TABLET ORAL DAILY
Status: DISCONTINUED | OUTPATIENT
Start: 2024-02-23 | End: 2024-03-02 | Stop reason: HOSPADM

## 2024-02-23 RX ORDER — ONDANSETRON 2 MG/ML
4 INJECTION INTRAMUSCULAR; INTRAVENOUS EVERY 6 HOURS PRN
Status: DISCONTINUED | OUTPATIENT
Start: 2024-02-23 | End: 2024-03-02 | Stop reason: HOSPADM

## 2024-02-23 RX ORDER — NOREPINEPHRINE BITARTRATE 0.06 MG/ML
.01-.6 INJECTION, SOLUTION INTRAVENOUS CONTINUOUS
Status: DISCONTINUED | OUTPATIENT
Start: 2024-02-23 | End: 2024-02-26

## 2024-02-23 RX ORDER — LACTULOSE 10 G/10G
20 SOLUTION ORAL 3 TIMES DAILY
Status: DISCONTINUED | OUTPATIENT
Start: 2024-02-23 | End: 2024-02-24

## 2024-02-23 RX ADMIN — FOLIC ACID 1 MG: 1 TABLET ORAL at 21:31

## 2024-02-23 RX ADMIN — PHYTONADIONE 5 MG: 10 INJECTION, EMULSION INTRAMUSCULAR; INTRAVENOUS; SUBCUTANEOUS at 21:32

## 2024-02-23 RX ADMIN — ACETAMINOPHEN 1000 MG: 500 TABLET ORAL at 09:56

## 2024-02-23 RX ADMIN — THIAMINE HYDROCHLORIDE 100 MG: 100 INJECTION, SOLUTION INTRAMUSCULAR; INTRAVENOUS at 10:54

## 2024-02-23 RX ADMIN — NOREPINEPHRINE BITARTRATE 0.03 MCG/KG/MIN: 0.06 INJECTION, SOLUTION INTRAVENOUS at 12:06

## 2024-02-23 RX ADMIN — SODIUM CHLORIDE 1000 ML: 9 INJECTION, SOLUTION INTRAVENOUS at 09:29

## 2024-02-23 RX ADMIN — VANCOMYCIN HYDROCHLORIDE 2000 MG: 1 INJECTION, POWDER, LYOPHILIZED, FOR SOLUTION INTRAVENOUS at 10:31

## 2024-02-23 RX ADMIN — SODIUM CHLORIDE 1000 ML: 9 INJECTION, SOLUTION INTRAVENOUS at 10:13

## 2024-02-23 RX ADMIN — RIFAXIMIN 550 MG: 550 TABLET ORAL at 21:31

## 2024-02-23 RX ADMIN — LACTULOSE 20 G: 10 POWDER, FOR SOLUTION ORAL at 21:33

## 2024-02-23 RX ADMIN — CEFEPIME HYDROCHLORIDE 2 G: 2 INJECTION, POWDER, FOR SOLUTION INTRAVENOUS at 09:34

## 2024-02-23 RX ADMIN — THIAMINE HYDROCHLORIDE 500 MG: 100 INJECTION, SOLUTION INTRAMUSCULAR; INTRAVENOUS at 21:31

## 2024-02-23 RX ADMIN — CEFEPIME HYDROCHLORIDE 2 G: 2 INJECTION, POWDER, FOR SOLUTION INTRAVENOUS at 21:26

## 2024-02-23 ASSESSMENT — COLUMBIA-SUICIDE SEVERITY RATING SCALE - C-SSRS
2. HAVE YOU ACTUALLY HAD ANY THOUGHTS OF KILLING YOURSELF IN THE PAST MONTH?: NO
6. HAVE YOU EVER DONE ANYTHING, STARTED TO DO ANYTHING, OR PREPARED TO DO ANYTHING TO END YOUR LIFE?: NO
1. IN THE PAST MONTH, HAVE YOU WISHED YOU WERE DEAD OR WISHED YOU COULD GO TO SLEEP AND NOT WAKE UP?: NO

## 2024-02-23 ASSESSMENT — ACTIVITIES OF DAILY LIVING (ADL)
ADLS_ACUITY_SCORE: 35
ADLS_ACUITY_SCORE: 38
ADLS_ACUITY_SCORE: 35
ADLS_ACUITY_SCORE: 38
ADLS_ACUITY_SCORE: 35
ADLS_ACUITY_SCORE: 35
ADLS_ACUITY_SCORE: 38
ADLS_ACUITY_SCORE: 35
ADLS_ACUITY_SCORE: 38
ADLS_ACUITY_SCORE: 35
ADLS_ACUITY_SCORE: 38
ADLS_ACUITY_SCORE: 27

## 2024-02-23 NOTE — PHARMACY-VANCOMYCIN DOSING SERVICE
Pharmacy Vancomycin Initial Note  Date of Service 2024  Patient's  1970  53 year old, male    Indication: Sepsis    Current estimated CrCl = Estimated Creatinine Clearance: 38.6 mL/min (A) (based on SCr of 2.51 mg/dL (H)).    Creatinine for last 3 days  2024:  9:28 AM Creatinine 2.51 mg/dL    Recent Vancomycin Level(s) for last 3 days  No results found for requested labs within last 3 days.      Vancomycin IV Administrations (past 72 hours)                     vancomycin (VANCOCIN) 2,000 mg in sodium chloride 0.9 % 500 mL intermittent infusion (mg) 2,000 mg New Bag 24 1031                    Nephrotoxins and other renal medications (From now, onward)      Start     Dose/Rate Route Frequency Ordered Stop    24 0935  vancomycin (VANCOCIN) 2,000 mg in sodium chloride 0.9 % 500 mL intermittent infusion         2,000 mg  over 120 Minutes Intravenous ONCE 24 0932      24 0932  vancomycin place fierro - receiving intermittent dosing         1 each Intravenous SEE ADMIN INSTRUCTIONS 24 0932              Contrast Orders - past 72 hours (72h ago, onward)      None             Plan:  Start vancomycin 2000 mg IV x1, followed by intermittent dosing given ANGEL   Vancomycin monitoring method: Trough (Method 2 = manual dose calculation)  Vancomycin therapeutic monitoring goal: 15-20 mg/L  Pharmacy will check vancomycin levels as appropriate in 1-3 Days.    Serum creatinine levels will be ordered daily for the first week of therapy and at least twice weekly for subsequent weeks.      Autumn Nesbitt [facility]

## 2024-02-23 NOTE — CONSULTS
Hepatology Consultation    Mary Lopez   MRN# [id number]     Age: 53 year old YOB: 1970       Referring provider: Александр Patel  Attending Hepatologist: Dr. Liana Roldan   Consult requested for: decompensated liver disease       Assessment and Recommendation:   Assessment:   Mr. 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 He was noted to have a fever, hypothermia and worsening liver tests.       Recommendations:   # Sepsis   # Fevers  # ANGEL  # Anemia  - Infectious work up in progress. Has had recent COVID +, repeat testing today negative.   - has been able to take in nutrition and fluids at home. UA negative.   - now on broad abx.   - no overt signs of bleeding. Has history of mild spur cell anemia.     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 ABO O. Currently on hold for liver transplant due to recent COVID+ on 2/15. Will discuss appropriate timing for reactivation when appropriate with transplant surgery.   - Prior abdominal imaging 2/15 concerning for SBO, repeat CT today (noncon) with normal findings of bowel. No duct dilation or lymphadenopathy  - US abd 12/14 without HCC  - recent Peth 2/13 negative    # Ascites  - small to moderate ascites seen on imaging. Perform diagnostic paracentesis if able to obtain sample and send for cell count, cultures, albumin and total protein.  - with current ANGEL, diuretics on hold.     # Hepatic encephalopathy  - continue lactulose and rifaximin. Titrate for ~3 BM's per day.     # EV  - egd with gr I EV and GOV2 on 7/26/23. No evidence of GIB. Continue to monitor. Presence of EV is not a contraindication for placement of  OG/NG.     Plan of care discussed with Dr. Liana Roldan. The above note reflects our joint plan.     Thank you for the opportunity to be involved in Mary Lopez care. Please call with any questions or concerns.     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 His liver disease has been complicated by ascites, anasarca, HE, DM II who is currently listed for liver transplant (on hold due to recent COVID 2/15) who is seen in the ED with worsening confusion. He was noted to have a fever, hypothermia and worsening liver tests. He was noted to have a temp of 101.7 and hypotensive on admit.     He was unable to report where he was located but orientated to person and wife. His wife reports that he has been compliant taking his lactulose and rifaximin and has been having 3-4 green/brown stools daily. She was not aware of any fevers that he had but he has more chills at home. He denied any abdominal pain, nausea, vomiting. He has been able to eat without difficulty and was at his baseline of mobility. He has been on diuretics and has not needed a para for several months.              Past Medical History:     Past Medical History:   Diagnosis Date    ANGEL (acute kidney injury) (H24)     Alcoholic cirrhosis of liver without ascites (H) 07/13/2023    Alcoholic hepatitis with ascites (H28) 10/03/2023    Alcoholic hepatitis without ascites (H28) 07/13/2023    Closed fracture of one rib of left side 09/14/2023    Concussion without loss of consciousness 03/11/2020    Decompensated hepatic cirrhosis (H) 09/15/2023    Diabetes mellitus, type 2 (H) 11/22/2023    Essential hypertension 03/11/2020    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)     Mild hyperlipidemia 12/07/2021     Persistent insomnia 07/13/2023    Portal hypertension (H) 07/13/2023    Scrotal abscess     Secondary esophageal 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 (H) 07/13/2023    Tobacco abuse disorder 03/11/2020    Type 2 diabetes mellitus with hyperglycemia (H) 12/22/2023              Past Surgical History:     Past Surgical History:   Procedure Laterality Date    CHOLECYSTECTOMY      COLONOSCOPY N/A 1/2/2024    Procedure: COLONOSCOPY, WITH POLYPECTOMY;  Surgeon: Jak Urbina MD;  Location: [facility]    CV RIGHT HEART CATH MEASUREMENTS RECORDED N/A 1/30/2024    Procedure: Right Heart Catheterization;  Surgeon: Alfred Tafoya MD;  Location:  HEART CARDIAC CATH LAB    TONSILLECTOMY      VASECTOMY                Social History:     Social History     Tobacco Use    Smoking status: Former     Types: Cigarettes     Passive exposure: Never    Smokeless tobacco: Current     Types: Chew     Last attempt to quit: 1/1/2004    Tobacco comments:     Chew daily 1/3 of a tin per day   Substance Use Topics    Alcohol use: Not Currently     Alcohol/week: 12.0 standard drinks of alcohol     Types: 12 Standard drinks or equivalent per week     Comment: Sober since 6/25/2023             Family History:   The   I have reviewed this patient's family history and updated it with pertinent information if needed.  Family History   Problem Relation Age of Onset    Anxiety Disorder Mother     Depression Mother     Bipolar Disorder Mother     Chronic Obstructive Pulmonary Disease Mother     Lung Cancer Mother 81    Morbid Obesity Father     Diabetes Father     Diabetes Type 2  Brother     Substance Abuse Maternal Grandfather     Substance Abuse Paternal Grandfather     Colon Cancer No family hx of     Liver Disease No family hx of                   Immunizations:     Immunization History   Administered Date(s) Administered    COVID-19 12+ (2023-24) (Pfizer) 12/22/2023    COVID-19 MONOVALENT 12+ (Pfizer) 04/02/2021, 04/23/2021, 12/18/2021    Hepatitis B,  "Adult 08/23/2018    Influenza Vaccine >6 months,quad, PF 11/14/2021, 12/22/2023    Pneumococcal 20 valent Conjugate (Prevnar 20) 12/22/2023    TD,PF 7+ (Tenivac) 05/21/2005, 09/26/2017    TDAP Vaccine (Boostrix) 05/21/2005    Zoster recombinant adjuvanted (SHINGRIX) 09/16/2022, 11/19/2022            Allergies:     Allergies   Allergen Reactions    Food Anaphylaxis     baked beans    Fish Allergy     Lactose GI Disturbance    Pineapple Itching             Medications:     Current Facility-Administered Medications   Medication    ceFEPIme (MAXIPIME) 2 g vial to attach to  mL bag for ADULTS or 50 mL bag for PEDS    glucose gel 15-30 g    Or    dextrose 50 % injection 25-50 mL    Or    glucagon injection 1 mg    norepinephrine (LEVOPHED) 16 mg in  mL infusion MAX CONC CENTRAL LINE    ondansetron (ZOFRAN ODT) ODT tab 4 mg    Or    ondansetron (ZOFRAN) injection 4 mg    thiamine (B-1) injection 100 mg    vancomycin place fierro - receiving intermittent dosing               Review of Systems:    ROS: 10 point ROS neg other than the symptoms noted above in the HPI.          Physical Exam:   Blood pressure (!) 76/39, pulse 100, temperature 99.6  F (37.6  C), temperature source Oral, resp. rate 16, height 1.727 m (5' 8\"), weight 95.3 kg (210 lb), SpO2 95%. Body mass index is 31.93 kg/m .    General: In no acute distress, no facial muscle wasting  Neuro: AOx1-2,  trace  asterixis  HEENT: PERRL  mild  scleral icterus, Nooral lesions  Lymph:  Nocervical lymphadenoapthy  CV:  Skin warm and dry  Lungs:  Respirations even and nonlabored on room air  Abd: Mildly distended, nontender  Extrem:  +2 lower  peripehral edema  Skin:  mild jaundice  Psych: flat         Data:     Lab Results   Component Value Date    WBC 28.6 02/23/2024    WBC 10.8 12/19/2006     Lab Results   Component Value Date    RBC 1.94 02/23/2024    RBC 5.56 12/19/2006     Lab Results   Component Value Date    HGB 6.6 02/23/2024    HGB 16.9 12/19/2006 " "    Lab Results   Component Value Date    HCT 20.4 02/23/2024    HCT 47.5 12/19/2006     No components found for: \"MCT\"  Lab Results   Component Value Date     02/23/2024    MCV 85 12/19/2006     Lab Results   Component Value Date    MCH 34.0 02/23/2024    MCH 30.4 12/19/2006     Lab Results   Component Value Date    MCHC 32.4 02/23/2024    MCHC 35.6 12/19/2006     Lab Results   Component Value Date    RDW 14.9 02/23/2024    RDW 10.7 12/19/2006     Lab Results   Component Value Date    PLT 95 02/23/2024     12/19/2006       Last Basic Metabolic Panel:  Lab Results   Component Value Date     02/23/2024     07/05/2020      Lab Results   Component Value Date    POTASSIUM 5.5 02/23/2024    POTASSIUM 3.8 03/12/2022    POTASSIUM 4.2 07/05/2020     Lab Results   Component Value Date    CHLORIDE 91 02/23/2024    CHLORIDE 101 03/12/2022    CHLORIDE 102 07/05/2020     Lab Results   Component Value Date    MEG 8.2 02/23/2024    MEG 9.7 07/05/2020     Lab Results   Component Value Date    CO2 21 02/23/2024    CO2 25 03/12/2022    CO2 28 07/05/2020     Lab Results   Component Value Date    BUN 35.5 02/23/2024    BUN 11 03/12/2022    BUN 13 07/05/2020     Lab Results   Component Value Date    CR 2.51 02/23/2024    CR 0.95 07/05/2020     Lab Results   Component Value Date     02/23/2024     02/23/2024     03/12/2022     07/05/2020       Liver Function Studies -   Recent Labs   Lab Test 02/23/24  0928   PROTTOTAL 6.3*   ALBUMIN 2.4*   BILITOTAL 11.1*   ALKPHOS 191*   AST 56*   ALT 25       Lab Results   Component Value Date    INR 4.89 02/23/2024       MELD 3.0: 45 at 2/23/2024 10:29 AM  MELD-Na: 41 at 2/23/2024 10:29 AM  Calculated from:  Serum Creatinine: 2.51 mg/dL at 2/23/2024  9:28 AM  Serum Sodium: 123 mmol/L (Using min of 125 mmol/L) at 2/23/2024  9:28 AM  Total Bilirubin: 11.1 mg/dL at 2/23/2024  9:28 AM  Serum Albumin: 2.4 g/dL at 2/23/2024  9:28 AM  INR(ratio): 4.89 at " 2024 10:29 AM  Age at listin years  Sex: Male at 2024 10:29 AM      Previous work-up:   Lab Results   Component Value Date    HEPBANG Nonreactive 02/15/2024    HBCAB Nonreactive 02/15/2024    AUSAB <3.50 02/15/2024    HCVAB Nonreactive 02/15/2024    KE 2,948 (H) 2023    IRONSAT  2023      Comment:      Unable to calculate:  UIBC or Iron value is outside detectable level.    TTG 2.2 2023    TTGG 2.0 2023     (H) 2023    GEORGE Negative 2023    SMOOTHMUS 70 (H) 2023    W8VRDIL Whole Blood 2023    A1A 97 2023    E5MVNFH Negative 2023    J5GSXUZ Heterozygous (A) 2023    A5VEGZTE Not Applicable 2023    A8NFJZIJH See Note 2023    MITM2 1.2 2023    TSH 2.18 2022    CHOL 203 (H) 2022    HDL 71 2022     (H) 2022    TRIG 151 (H) 2022    A1C 7.7 (H) 2023    POPETH <10 2024    PLPETH <10 2024      Lab Results   Component Value Date    SPECDES  2023     Blood: ACD      LDRESULTS  2023     HEMOCHROMATOSIS RESULTS    HFE Gene C282Y (G845A) RESULTS:    C282Y Mutation Interpretation: HETEROZYGOTE    HFE Gene H63D (C187G) RESULTS:    H63D Mutation Interpretation: NORMAL    HFE Gene S65C (A193T) RESULTS:    S65C Mutation Interpretation: NORMAL              Previous Endoscopy:     No results found for this or any previous visit.  Results for orders placed or performed during the hospital encounter of 24   COLONOSCOPY   Result Value Ref Range    COLONOSCOPY       [address] [address]  _______________________________________________________________________________  Patient Name: Mary Lopez       Procedure Date: 2024 12:51 PM  MRN: [id number]                       Account Number: [id number]  YOB: 1970             Admit Type: Outpatient  Age: 53                               Room:  [facility] 02  Gender: Male                          Note Status: Finalized  Attending MD: ALEX HART , ,      Total Sedation Time:   _______________________________________________________________________________     Procedure:             Colonoscopy  Indications:           Screening for colorectal malignant neoplasm  Providers:             ALEX HART  Referring MD:          JOSTIN MONTEZ MD  Medicines:             Monitored Anesthesia Care  Complications:         No immediate complications.  _______________________________________________________________________________  P rocedure:             After obtaining informed consent, the colonoscope was                          passed under direct vision. Throughout the procedure,                          the patient's blood pressure, pulse, and oxygen                          saturations were monitored continuously. The                          Colonoscope was introduced through the anus and                          advanced to the terminal ileum. The patient tolerated                          the procedure well. The quality of the bowel                          preparation was good to excellent.                                                                                   Findings:       The terminal ileum appeared normal with brief intubation.       A 3 mm polyp was found in the transverse colon. The polyp was        semi-sessile. The polyp was removed with a cold biopsy forceps.        Resection and retrieval were complete.       The exam was otherwise without abnormality.                                                                                    Impression:            - The examined portion of the ileum was normal.                         - One 3 mm polyp in the transverse colon, removed with                          a cold biopsy forceps. Resected and retrieved.                         - The examination was otherwise  normal.  Recommendation:        - Await pathology results.                         - Repeat colonoscopy after studies are complete for                          surveillance.                                                                                   Procedure Code(s):       --- Professional ---       47382, Colonoscopy, flexible; with biopsy, single or multiple  Diagnosis Code(s):       --- Professional ---       Z12.11, Encounter for screening for malignant neoplasm of colon       D12.3, Benign neoplasm of transverse colon (hepatic flexure or splenic        flexure)    CPT copyright 2022 [employer]. All rights reserved.    The codes doc umented in this report are preliminary and upon  review may   be revised to meet current compliance requirements.    Dr. Alex Urbina  ________________  ALEX URBINA,   1/2/2024 1:23:56 PM  I was physically present for the entire viewing portion of the exam.ALEX URBINA  Number of Addenda: 0    Note Initiated On: 1/2/2024 12:51 PM       No results found for this or any previous visit.      IMAGING:  No results found for this or any previous visit.    Results for orders placed during the hospital encounter of 02/23/24    XR Chest Port 1 View    Narrative  Exam: XR CHEST PORT 1 VIEW, 2/23/2024 9:41 AM    Comparison: 2/15/2024    History: hypoxia, fever, cough    Findings:  Single AP portable semiupright view of the chest.    Trachea is midline. Stable cardiomediastinal silhouette. Unchanged  elevation of the right hemidiaphragm. Low lung volumes. Mild diffuse  interstitial opacities. Left basilar opacity at the costophrenic  angle. There is no pneumothorax or pleural effusion. Right upper  quadrant surgical clips.    Impression  Impression:  1. Retrocardiac opacities could represent infection/atelectasis.  2. Low lung volumes and findings of pulmonary edema.    I have personally reviewed the examination and initial interpretation  and I agree with the  findings.    SHIVANI JUSTIN MD      SYSTEM ID:  [id number]    Results for orders placed in visit on 12/14/23    US Abdomen Complete w Doppler Complete    Narrative  Complete abdominal ultrasound with complete Doppler ultrasound    INDICATION: Alcoholic cirrhosis of liver with ascites. Portal  hypertension.    COMPARISON: [facility] 12/7/2023. Right upper quadrant ultrasound 10/4/2022.    FINDINGS: Grayscale ultrasound the abdomen shows enlarged hyperechoic  liver measuring 20.6 cm in length. No masses.  Main portal vein caliber normal at 12 mm.  Gallbladder is surgically removed. Common bile duct normal caliber  between 4 and 5 mm.  Both kidneys appear intrinsically normal with the right kidney length  11.6 cm an left kidney slightly elongated at 14.3 cm. No  hydronephrosis.  The included abdominal aorta and IVC were grossly normal.  Spleen is elongated 16.3 cm without mass.  Small amount of upper abdominal ascites near the liver.    Doppler flow assessments with velocities in centimeters per second as  follows:  Splenic vein antegrade 34  Main portal vein retrograde 18  Right portal vein retrograde 12  Left portal vein antegrade 23 with recanalized paraumbilical vein.  Left, middle and right hepatic veins all antegrade with velocities of  11, 32 and 46 cm/s.  IVC antegrade toward the heart measuring 300 cm/s.  Proper hepatic artery measuring 114 cm/s at peak systole with normal  resistive index of 0.66.  Right and left hepatic arteries with velocities of 117 and 59 cm/s  peak systole with respect the resistive indices of 0.63 and 0.73.    Impression  IMPRESSION: Retrograde main and right portal vein flows. Chronic liver  disease with elongated cirrhotic liver without mass. Ascites.  Splenomegaly. Retrograde main and right portal vein flows. Visibly  recanalized paraumbilical vein.    SOULEYMANE CALL MD      SYSTEM ID:  [id number]    Results for orders placed in visit on 10/04/22    US Abdomen  Complete    Narrative  EXAMINATION: US ABDOMEN COMPLETE, 10/4/2022 9:09 AM    COMPARISON: Abdominal ultrasound 11/29/2005    HISTORY:  Elevated liver enzymes    TECHNIQUE: The abdomen was scanned in standard fashion with  specialized ultrasound transducer(s) using both gray-scale and limited  color Doppler techniques.    FINDINGS:  Liver: Diffuse, marked increased echogenicity, which attenuates the  sound beam.  No focal solid mass.The main portal vein is patent with  antegrade flow, measuring 1.3 cm. The liver measures 20.8 cm in  sagittal dimension.    Gallbladder: Surgically absent.    Bile Ducts: Both the intra- and extrahepatic biliary system are of  normal caliber.  The common bile duct measures 5 mm in diameter.    Pancreas: Visualized portions of the head and body of the pancreas are  unremarkable. Obscured by bowel gas.    Kidneys: No hydronephrosis.  The craniocaudal dimensions are: right-  13.9 cm, left- 13.9 cm.    Spleen: The spleen is normal in size,  measuring 13.2 cm in sagittal  dimension.    Aorta and IVC: The visualized portions of the aorta and IVC are not  dilated..    Fluid: No evidence of ascites or pleural effusions.    Impression  IMPRESSION:  1.  Diffuse increased liver echogenicity, indicative of parenchymal  hepatic disease, most commonly fatty liver.    LUIS MAK MD      SYSTEM ID:  [id number]    CT Abdomen Pelvis w/o Contrast    Result Date: 2/23/2024  RESIDENT PRELIMINARY INTERPRETATION IMPRESSION: 1. Cirrhotic morphology of the liver with sequela of portal hypertension including splenomegaly and perisplenic/esophageal varices. Small volume ascites. 2. No definite acute pathology is appreciated in the abdomen on this noncontrast exam. 3. Trace left pleural effusion and overlying consolidative opacity favored to represent atelectasis.    XR Chest Port 1 View    Result Date: 2/23/2024  Exam: XR CHEST PORT 1 VIEW, 2/23/2024 9:41 AM Comparison: 2/15/2024 History: hypoxia, fever, cough  Findings: Single AP portable semiupright view of the chest. Trachea is midline. Stable cardiomediastinal silhouette. Unchanged elevation of the right hemidiaphragm. Low lung volumes. Mild diffuse interstitial opacities. Left basilar opacity at the costophrenic angle. There is no pneumothorax or pleural effusion. Right upper quadrant surgical clips.     Impression: 1. Retrocardiac opacities could represent infection/atelectasis. 2. Low lung volumes and findings of pulmonary edema. I have personally reviewed the examination and initial interpretation and I agree with the findings. SHIVANI JUSTIN MD   SYSTEM ID:  [id number]

## 2024-02-23 NOTE — PLAN OF CARE
ICU End of Shift Summary. See flowsheets for vital signs and detailed assessment.    Changes this shift: Disoriented to place (unable to verbalize he's at the hospital, name of hospital, or city) and time (reports March and 2023), redirectable and calm. Reports feeling hungry, team reports will check with attending pending bedside abdominal US? MAP goal > 65, Levo infusing at 0.14, right arm BP checks q15 mins. RA. BG checks q4hrs x48hrs, started at 1400 today, will end 2/25 at 1400. Voided via bedside commode, refused bedpan and urinal, steady on feet. Mild petechiae rash on chest and back, continue to monitor. Wife at bedside. COVID positive 2/15, recheck today 2/23, negative, no symptoms, no isolation required. Hgb 6.6, 1 unit completed, no obvious signs of bleed.     Plan: Monitor mentation. Monitor pressor needs, MAP goal > 65. Continue with POC.     Goal Outcome Evaluation:      Plan of Care Reviewed With: patient, spouse    Overall Patient Progress: no changeOverall Patient Progress: no change    Outcome Evaluation: Pressor need, MAP goal > 65. Infection workup.

## 2024-02-23 NOTE — ED TRIAGE NOTES
"Triage Assessment & Note:    BP (!) 85/46   Pulse 113   Temp (!) 101.7  F (38.7  C) (Oral)   Resp 16   Ht 1.727 m (5' 8\")   Wt 95.3 kg (210 lb)   SpO2 92%   BMI 31.93 kg/m      Patient presents with: Liver failure pt who c/o confusion, weakness. PT appears septic.     Home Treatments/Remedies: None    Febrile / Afebrile? Afebrile     Duration of C/o:  2 days    Kirill Crawford RN  February 23, 2024       Triage Assessment (Adult)       Row Name 02/23/24 0917          Triage Assessment    Airway WDL WDL        Respiratory WDL    Respiratory WDL WDL        Cardiac WDL    Cardiac WDL WDL                     "

## 2024-02-23 NOTE — PROGRESS NOTES
Brief discussion with Adina today. Patient is taking 36 units Tresiba. 15 units Humalog with breakfast and lunch, 13 units with dinner. Correction scale 1/35/175/210. Adina made an error last night and gave patient fixed dose insulin with correction without a meal. Lunch was also low carb/just a salad. He has been given juice and glucose on finger stick is reading 80 at time of visit.   We reviewed the difference between the fixed insulin dose and the correction scale. Fixed dose only to be given with a meal and to be held with a very low carb meal. Correction given before meals. Correction should be spaced at least four hour apart.     Consulted with Endo on Call and Suzanne THOMPSON.  Patient was admitted to the hospital in the interim.   Messages sent to inpatient team recommending Endocrinology and Diabetes Education consult.

## 2024-02-23 NOTE — ED PROVIDER NOTES
[facility] ([facility])    2/23/24       ED PROVIDER NOTE    History     Chief Complaint   Patient presents with    Altered Mental Status     HPI  Mary Lopez is a 53 year old male with PMH notable for anemia in the settings of end-stage liver disease 2/2 alcoholic liver cirrhosis, pulmonary hypertension, DM2 with long-term use of insulin who presents to the Emergency Department due to altered mental status.  History provided by EMS and chart review, limited history from patient given his condition. Patient's symptoms began last night when he found to be fatigued and feverish at home.  This morning he woke with excessive lethargy and altered mental status.  In the emergency department, he is febrile, hypotensive and tachycardic.  He has no localizing symptoms, no cough, no nausea, no vomiting, or abnormal diarrhea (patient is taking lactulose).  Notably, he did test positive for COVID on 2/15/2024.     Past Medical History  Past Medical History:   Diagnosis Date    ANGEL (acute kidney injury) (H24)     Alcoholic cirrhosis of liver without ascites (H) 07/13/2023    Alcoholic hepatitis with ascites (H28) 10/03/2023    Alcoholic hepatitis without ascites (H28) 07/13/2023    Closed fracture of one rib of left side 09/14/2023    Concussion without loss of consciousness 03/11/2020    Decompensated hepatic cirrhosis (H) 09/15/2023    Diabetes mellitus, type 2 (H) 11/22/2023    Essential hypertension 03/11/2020    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)     Mild hyperlipidemia 12/07/2021    Persistent insomnia 07/13/2023    Portal hypertension (H) 07/13/2023    Scrotal abscess     Secondary esophageal 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 (H) 07/13/2023    Tobacco abuse disorder 03/11/2020    Type 2 diabetes mellitus with hyperglycemia (H) 12/22/2023     Past Surgical History:   Procedure Laterality Date    CHOLECYSTECTOMY      COLONOSCOPY N/A 1/2/2024    Procedure: COLONOSCOPY,  WITH POLYPECTOMY;  Surgeon: Jak Urbina MD;  Location: [facility]    CV RIGHT HEART CATH MEASUREMENTS RECORDED N/A 1/30/2024    Procedure: Right Heart Catheterization;  Surgeon: Alfred Tafoya MD;  Location:  HEART CARDIAC CATH LAB    TONSILLECTOMY      VASECTOMY       blood glucose (NO BRAND SPECIFIED) test strip  blood glucose monitoring (NO BRAND SPECIFIED) meter device kit  Continuous Blood Gluc Sensor (DEXCOM G7 SENSOR) MISC  cyclobenzaprine (FLEXERIL) 10 MG tablet  doxepin (SINEQUAN) 10 MG capsule  folic acid (FOLVITE) 1 MG tablet  HYDROcodone-acetaminophen (NORCO) 5-325 MG tablet  insulin degludec (TRESIBA FLEXTOUCH) 100 UNIT/ML pen  Insulin Lispro (HUMALOG KWIKPEN) 200 UNIT/ML soln  insulin pen needle (BD PEN NEEDLE SHERRIE 2ND GEN) 32G X 4 MM miscellaneous  lactulose (CHRONULAC) 10 GM/15ML solution  melatonin 10 MG TABS tablet  metFORMIN (GLUCOPHAGE XR) 500 MG 24 hr tablet  multivitamin w/minerals (THERA-VIT-M) tablet  omeprazole (PRILOSEC) 20 MG DR capsule  potassium chloride ER (KLOR-CON M) 10 MEQ CR tablet  rifaximin (XIFAXAN) 550 MG TABS tablet  spironolactone (ALDACTONE) 25 MG tablet  thin (NO BRAND SPECIFIED) lancets  torsemide (DEMADEX) 10 MG tablet  zinc oxide (DESITIN) 20 % external ointment      Allergies   Allergen Reactions    Food Anaphylaxis     baked beans    Fish Allergy     Lactose GI Disturbance    Pineapple Itching     Family History  Family History   Problem Relation Age of Onset    Anxiety Disorder Mother     Depression Mother     Bipolar Disorder Mother     Chronic Obstructive Pulmonary Disease Mother     Lung Cancer Mother 81    Morbid Obesity Father     Diabetes Father     Diabetes Type 2  Brother     Substance Abuse Maternal Grandfather     Substance Abuse Paternal Grandfather     Colon Cancer No family hx of     Liver Disease No family hx of      Social History   Social History     Tobacco Use    Smoking status: Former     Types: Cigarettes     Passive exposure: Never     "Smokeless tobacco: Current     Types: Chew     Last attempt to quit: 1/1/2004    Tobacco comments:     Chew daily 1/3 of a tin per day   Vaping Use    Vaping Use: Never used   Substance Use Topics    Alcohol use: Not Currently     Alcohol/week: 12.0 standard drinks of alcohol     Types: 12 Standard drinks or equivalent per week     Comment: Sober since 6/25/2023    Drug use: Not Currently         A complete review of systems was performed with pertinent positives and negatives noted in the HPI, and all other systems negative.    Physical Exam   BP: (!) 85/46  Pulse: 113  Temp: (!) 101.7  F (38.7  C)  Resp: 16  Height: 172.7 cm (5' 8\")  Weight: 95.3 kg (210 lb)  SpO2: 92 %  Physical Exam  Vitals and nursing note reviewed.   Constitutional:       General: He is in acute distress.      Appearance: He is ill-appearing and toxic-appearing.   HENT:      Head: Normocephalic and atraumatic.      Mouth/Throat:      Pharynx: Oropharynx is clear.   Eyes:      Extraocular Movements: Extraocular movements intact.      Pupils: Pupils are equal, round, and reactive to light.   Cardiovascular:      Rate and Rhythm: Regular rhythm. Tachycardia present.   Pulmonary:      Effort: Pulmonary effort is normal. No respiratory distress.      Breath sounds: No stridor. Rhonchi and rales present.   Abdominal:      General: There is distension.      Palpations: Abdomen is soft.      Tenderness: There is abdominal tenderness. There is no guarding.   Musculoskeletal:         General: Swelling present.      Cervical back: Normal range of motion and neck supple.   Skin:     General: Skin is warm and dry.   Neurological:      Mental Status: He is lethargic.      Cranial Nerves: No facial asymmetry.      Sensory: No sensory deficit.   Psychiatric:         Mood and Affect: Mood normal.         Behavior: Behavior normal.           ED Course, Procedures, & Data      [facility]    -Central " Line    Date/Time: 2/23/2024 12:10 PM    Performed by: Александр Patel MD  Authorized by: Алескандр Patel MD    Risks, benefits and alternatives discussed.      PRE-PROCEDURE DETAILS:     Hand hygiene: Hand hygiene performed prior to insertion      Sterile barrier technique: All elements of maximal sterile technique followed      Skin preparation:  2% chlorhexidine     ANESTHESIA    Anesthesia:  Local infiltration  Local Anesthetic:  Lidocaine 1% without epinephrine    PROCEDURE DETAILS:     Location:  R internal jugular    Patient position:  Reverse Trendelenburg    Procedural supplies:  Triple lumen    Catheter size:  7 Fr    Landmarks identified: yes      Ultrasound guidance: yes      Sterile ultrasound techniques: Sterile gel and sterile probe covers were used      Number of attempts:  1    Successful placement: yes      POST PROCEDURE DETAILS:      Post-procedure:  Dressing applied and line sutured    Assessment:  Blood return through all ports, no pneumothorax on x-ray, free fluid flow and placement verified by x-ray         A consult was attained from the gastroenterology hepatology service. The case was discussed with on call provider from that service. The consulting service's recommendations were provided promptly.               Results for orders placed or performed during the hospital encounter of 02/23/24   XR Chest Port 1 View     Status: None    Narrative    Exam: XR CHEST PORT 1 VIEW, 2/23/2024 9:41 AM    Comparison: 2/15/2024    History: hypoxia, fever, cough    Findings:  Single AP portable semiupright view of the chest.    Trachea is midline. Stable cardiomediastinal silhouette. Unchanged  elevation of the right hemidiaphragm. Low lung volumes. Mild diffuse  interstitial opacities. Left basilar opacity at the costophrenic  angle. There is no pneumothorax or pleural effusion. Right upper  quadrant surgical clips.      Impression    Impression:   1. Retrocardiac opacities could represent  infection/atelectasis.  2. Low lung volumes and findings of pulmonary edema.    I have personally reviewed the examination and initial interpretation  and I agree with the findings.    SHIVANI JUSTIN MD         SYSTEM ID:  [id number]   CT Abdomen Pelvis w/o Contrast     Status: None    Narrative    EXAMINATION: CT ABDOMEN PELVIS W/O CONTRAST, 2/23/2024 11:20 AM    INDICATION: abd distention and pain, liver cirrhosis, fever,    COMPARISON STUDY: CT 2/15/2024, 2/6/2024    TECHNIQUE: CT scan of the abdomen and pelvis was performed without  contrast on multidetector CT scanner using volumetric acquisition  technique and images were reconstructed in multiple planes with  variable thickness and reviewed on dedicated workstations.     CT scan radiation dose is optimized to minimum requisite dose using  automated dose modulation techniques.    FINDINGS:    Lower thorax: Trace left pleural effusion with overlying consolidative  opacity containing air bronchograms. Paraesophageal varices.    Abdomen/pelvis:  Liver: Cirrhotic morphology of the liver. No intrahepatic biliary  ductal dilation.    Biliary System: The gallbladder is surgically absent. No extrahepatic  biliary ductal dilation.    Pancreas: No mass or pancreatic ductal dilation.    Adrenal glands: The right adrenal gland is normal. The left adrenal  gland is difficult to characterize given adjacent splenic varices.    Spleen: Enlarged measuring approximately 18 cm in greatest axial  dimension    Kidneys: No obstructing calculus or hydronephrosis.    Gastrointestinal tract : Normal caliber small bowel.    Mesentery/peritoneum/retroperitoneum: No mass. No free air.  Small to  moderate volume ascites. Fat and fluid containing right inguinal  hernia.    Lymph nodes: No significant lymphadenopathy.    Vasculature: Patency of the major intra-abdominal vasculature cannot  be assessed on this noncontrast exam. Normal caliber aorta.  Paraesophageal and splenic varices as noted  above.    Pelvis: Urinary bladder is normal.  The prostate is normal in size.    Osseous structures: No aggressive or acute osseous lesion.  Mild  degenerative changes of the spine most significant at L5-S1.      Soft tissues: Bilateral gynecomastia.  Diffuse subcutaneous anasarca.      Impression    IMPRESSION:   1. Cirrhotic morphology of the liver with sequela of portal  hypertension including splenomegaly and perisplenic/esophageal  varices. Small volume ascites.    2. No definite acute pathology is appreciated in the abdomen on this  noncontrast exam.    3. Trace left pleural effusion and overlying consolidative opacity  favored to represent atelectasis.    I have personally reviewed the examination and initial interpretation  and I agree with the findings.    JULIO JENKINS MD         SYSTEM ID:  [id number]   XR Chest Port 1 View     Status: None (Preliminary result)    Impression    RESIDENT PRELIMINARY INTERPRETATION  Impression:   1. Interval placement of right IJ central venous catheter with tip  projecting over the right atrium.  2. Stable retrocardiac opacities which could represent  infection/atelectasis.  3. Similar low lung volumes and findings of pulmonary edema.     INR     Status: Abnormal   Result Value Ref Range    INR 4.18 (H) 0.85 - 1.15   PTT     Status: Abnormal   Result Value Ref Range    aPTT 66 (H) 22 - 38 Seconds   Basic metabolic panel     Status: Abnormal   Result Value Ref Range    Sodium 123 (L) 135 - 145 mmol/L    Potassium 5.5 (H) 3.4 - 5.3 mmol/L    Chloride 91 (L) 98 - 107 mmol/L    Carbon Dioxide (CO2) 21 (L) 22 - 29 mmol/L    Anion Gap 11 7 - 15 mmol/L    Urea Nitrogen 35.5 (H) 6.0 - 20.0 mg/dL    Creatinine 2.51 (H) 0.67 - 1.17 mg/dL    GFR Estimate 30 (L) >60 mL/min/1.73m2    Calcium 8.2 (L) 8.6 - 10.0 mg/dL    Glucose 141 (H) 70 - 99 mg/dL   Hepatic panel     Status: Abnormal   Result Value Ref Range    Protein Total 6.3 (L) 6.4 - 8.3 g/dL    Albumin 2.4 (L) 3.5 - 5.2 g/dL     Bilirubin Total 11.1 (H) <=1.2 mg/dL    Alkaline Phosphatase 191 (H) 40 - 150 U/L    AST 56 (H) 0 - 45 U/L    ALT 25 0 - 70 U/L    Bilirubin Direct 6.66 (H) 0.00 - 0.30 mg/dL   Lactic acid whole blood     Status: Abnormal   Result Value Ref Range    Lactic Acid 4.5 (HH) 0.7 - 2.0 mmol/L   Blood gas venous     Status: Abnormal   Result Value Ref Range    pH Venous 7.38 7.32 - 7.43    pCO2 Venous 41 40 - 50 mm Hg    pO2 Venous 30 25 - 47 mm Hg    Bicarbonate Venous 24 21 - 28 mmol/L    Base Excess/Deficit Venous -1.1 -3.0 - 3.0 mmol/L    FIO2 23     Oxyhemoglobin Venous 53 (L) 70 - 75 %    O2 Sat, Venous 54.5 (L) 70.0 - 75.0 %    Narrative    In healthy individuals, oxyhemoglobin (O2Hb) and oxygen saturation (SO2) are approximately equal. In the presence of dyshemoglobins, oxyhemoglobin can be considerably lower than oxygen saturation.   Ammonia     Status: Abnormal   Result Value Ref Range    Ammonia 64 (H) 16 - 60 umol/L   Asymptomatic Influenza A/B, RSV, & SARS-CoV2 PCR (COVID-19) Nasopharyngeal     Status: Normal    Specimen: Nasopharyngeal; Swab   Result Value Ref Range    Influenza A PCR Negative Negative    Influenza B PCR Negative Negative    RSV PCR Negative Negative    SARS CoV2 PCR Negative Negative    Narrative    Testing was performed using the Xpert Xpress CoV2/Flu/RSV Assay on the [employer] GeneXpert Instrument. This test should be ordered for the detection of SARS-CoV-2, influenza, and RSV viruses in individuals who meet clinical and/or epidemiological criteria. Test performance is unknown in asymptomatic patients. This test is for in vitro diagnostic use under the FDA EUA for laboratories certified under CLIA to perform high or moderate complexity testing. This test has not been FDA cleared or approved. A negative result does not rule out the presence of PCR inhibitors in the specimen or target RNA in concentration below the limit of detection for the assay. If only one viral target is positive but  coinfection with multiple targets is suspected, the sample should be re-tested with another FDA cleared, approved, or authorized test, if coinfection would change clinical management. This test was validated by the [facility]. These laboratories are certified under the Clinical Laboratory Improvement Amendments of 1988 (CLIA-88) as qualified to perform high complexity laboratory testing.   CBC with platelets and differential     Status: Abnormal   Result Value Ref Range    WBC Count 28.6 (H) 4.0 - 11.0 10e3/uL    RBC Count 1.94 (L) 4.40 - 5.90 10e6/uL    Hemoglobin 6.6 (LL) 13.3 - 17.7 g/dL    Hematocrit 20.4 (L) 40.0 - 53.0 %     (H) 78 - 100 fL    MCH 34.0 (H) 26.5 - 33.0 pg    MCHC 32.4 31.5 - 36.5 g/dL    RDW 14.9 10.0 - 15.0 %    Platelet Count 95 (L) 150 - 450 10e3/uL    % Neutrophils 87 %    % Lymphocytes 3 %    % Monocytes 5 %    % Eosinophils 1 %    % Basophils 0 %    % Immature Granulocytes 4 %    NRBCs per 100 WBC 0 <1 /100    Absolute Neutrophils 24.8 (H) 1.6 - 8.3 10e3/uL    Absolute Lymphocytes 0.8 0.8 - 5.3 10e3/uL    Absolute Monocytes 1.5 (H) 0.0 - 1.3 10e3/uL    Absolute Eosinophils 0.2 0.0 - 0.7 10e3/uL    Absolute Basophils 0.1 0.0 - 0.2 10e3/uL    Absolute Immature Granulocytes 1.2 (H) <=0.4 10e3/uL    Absolute NRBCs 0.0 10e3/uL   Procalcitonin     Status: Abnormal   Result Value Ref Range    Procalcitonin 1.97 (H) <0.50 ng/mL   Glucose by meter     Status: Abnormal   Result Value Ref Range    GLUCOSE BY METER POCT 145 (H) 70 - 99 mg/dL   INR     Status: Abnormal   Result Value Ref Range    INR 4.89 (H) 0.85 - 1.15   Lactic acid whole blood     Status: Abnormal   Result Value Ref Range    Lactic Acid 3.5 (H) 0.7 - 2.0 mmol/L   Adult Type and Screen     Status: None   Result Value Ref Range    ABO/RH(D) O POS     Antibody Screen Negative Negative    SPECIMEN EXPIRATION DATE [id number]    Prepare red blood cells (unit)     Status: None   Result Value Ref Range     Blood Component Type Red Blood Cells     Product Code [id number]     Unit Status Transfused     Unit Number [id number]     CROSSMATCH Compatible     CODING SYSTEM [id number]     ISSUE DATE AND TIME [id number]     UNIT ABO/RH O+     UNIT TYPE ISBT 5100    CBC with Platelets & Differential     Status: Abnormal    Narrative    The following orders were created for panel order CBC with Platelets & Differential.  Procedure                               Abnormality         Status                     ---------                               -----------         ------                     CBC with platelets and d...[[id number]]  Abnormal            Final result                 Please view results for these tests on the individual orders.   ABO/Rh type and screen *Canceled*     Status: None ()    Narrative    The following orders were created for panel order ABO/Rh type and screen.  Procedure                               Abnormality         Status                     ---------                               -----------         ------                       Please view results for these tests on the individual orders.   ABO/Rh type and screen *Canceled*     Status: None ()    Narrative    The following orders were created for panel order ABO/Rh type and screen.  Procedure                               Abnormality         Status                     ---------                               -----------         ------                     Adult Type and Screen[[id number]]                                                         Please view results for these tests on the individual orders.   ABO/Rh type and screen     Status: None    Narrative    The following orders were created for panel order ABO/Rh type and screen.  Procedure                               Abnormality         Status                     ---------                               -----------         ------                     Adult Type and Screen[[id number]]                             Final result                 Please view results for these tests on the individual orders.     Medications   vancomycin place fierro - receiving intermittent dosing (has no administration in time range)   thiamine (B-1) injection 100 mg (100 mg Intravenous $Given 2/23/24 1054)   ceFEPIme (MAXIPIME) 2 g vial to attach to  mL bag for ADULTS or 50 mL bag for PEDS (has no administration in time range)   lidocaine 1% with EPINEPHrine 1:100,000 1 %-1:[id number] injection (has no administration in time range)   norepinephrine (LEVOPHED) 16 mg in  mL infusion MAX CONC CENTRAL LINE (0.13 mcg/kg/min × 95.3 kg Intravenous Rate/Dose Change 2/23/24 1219)   sodium chloride 0.9% BOLUS 1,000 mL (0 mLs Intravenous Stopped 2/23/24 1048)   vancomycin (VANCOCIN) 2,000 mg in sodium chloride 0.9 % 500 mL intermittent infusion (2,000 mg Intravenous $New Bag 2/23/24 1031)   acetaminophen (TYLENOL) tablet 1,000 mg (1,000 mg Oral $Given 2/23/24 0956)   sodium chloride 0.9% BOLUS 1,000 mL (0 mLs Intravenous Stopped 2/23/24 1132)     Labs Ordered and Resulted from Time of ED Arrival to Time of ED Departure   INR - Abnormal       Result Value    INR 4.18 (*)    PARTIAL THROMBOPLASTIN TIME - Abnormal    aPTT 66 (*)    BASIC METABOLIC PANEL - Abnormal    Sodium 123 (*)     Potassium 5.5 (*)     Chloride 91 (*)     Carbon Dioxide (CO2) 21 (*)     Anion Gap 11      Urea Nitrogen 35.5 (*)     Creatinine 2.51 (*)     GFR Estimate 30 (*)     Calcium 8.2 (*)     Glucose 141 (*)    HEPATIC FUNCTION PANEL - Abnormal    Protein Total 6.3 (*)     Albumin 2.4 (*)     Bilirubin Total 11.1 (*)     Alkaline Phosphatase 191 (*)     AST 56 (*)     ALT 25      Bilirubin Direct 6.66 (*)    LACTIC ACID WHOLE BLOOD - Abnormal    Lactic Acid 4.5 (*)    BLOOD GAS VENOUS - Abnormal    pH Venous 7.38      pCO2 Venous 41      pO2 Venous 30      Bicarbonate Venous 24      Base Excess/Deficit Venous -1.1      FIO2 23      Oxyhemoglobin Venous 53 (*)      O2 Sat, Venous 54.5 (*)    AMMONIA - Abnormal    Ammonia 64 (*)    CBC WITH PLATELETS AND DIFFERENTIAL - Abnormal    WBC Count 28.6 (*)     RBC Count 1.94 (*)     Hemoglobin 6.6 (*)     Hematocrit 20.4 (*)      (*)     MCH 34.0 (*)     MCHC 32.4      RDW 14.9      Platelet Count 95 (*)     % Neutrophils 87      % Lymphocytes 3      % Monocytes 5      % Eosinophils 1      % Basophils 0      % Immature Granulocytes 4      NRBCs per 100 WBC 0      Absolute Neutrophils 24.8 (*)     Absolute Lymphocytes 0.8      Absolute Monocytes 1.5 (*)     Absolute Eosinophils 0.2      Absolute Basophils 0.1      Absolute Immature Granulocytes 1.2 (*)     Absolute NRBCs 0.0     PROCALCITONIN - Abnormal    Procalcitonin 1.97 (*)    GLUCOSE BY METER - Abnormal    GLUCOSE BY METER POCT 145 (*)    INR - Abnormal    INR 4.89 (*)    LACTIC ACID WHOLE BLOOD - Abnormal    Lactic Acid 3.5 (*)    INFLUENZA A/B, RSV, & SARS-COV2 PCR - Normal    Influenza A PCR Negative      Influenza B PCR Negative      RSV PCR Negative      SARS CoV2 PCR Negative     ROUTINE UA WITH MICROSCOPIC REFLEX TO CULTURE   TYPE AND SCREEN, ADULT    ABO/RH(D) O POS      Antibody Screen Negative      SPECIMEN EXPIRATION DATE [id number]     PREPARE RED BLOOD CELLS (UNIT)    Blood Component Type Red Blood Cells      Product Code [id number]      Unit Status Transfused      Unit Number [id number]      CROSSMATCH Compatible      CODING SYSTEM [id number]      ISSUE DATE AND TIME [id number]      UNIT ABO/RH O+      UNIT TYPE ISBT 5100     PREPARE RED BLOOD CELLS (UNIT)   BLOOD CULTURE   BLOOD CULTURE   TRANSFUSE RED BLOOD CELLS (UNIT)   ABO/RH TYPE AND SCREEN     XR Chest Port 1 View   Preliminary Result   RESIDENT PRELIMINARY INTERPRETATION   Impression:    1. Interval placement of right IJ central venous catheter with tip   projecting over the right atrium.   2. Stable retrocardiac opacities which could represent   infection/atelectasis.   3. Similar low lung  volumes and findings of pulmonary edema.         CT Abdomen Pelvis w/o Contrast   Final Result   IMPRESSION:    1. Cirrhotic morphology of the liver with sequela of portal   hypertension including splenomegaly and perisplenic/esophageal   varices. Small volume ascites.      2. No definite acute pathology is appreciated in the abdomen on this   noncontrast exam.      3. Trace left pleural effusion and overlying consolidative opacity   favored to represent atelectasis.      I have personally reviewed the examination and initial interpretation   and I agree with the findings.      JULIO JENKINS MD            SYSTEM ID:  [id number]      XR Chest Port 1 View   Final Result   Impression:    1. Retrocardiac opacities could represent infection/atelectasis.   2. Low lung volumes and findings of pulmonary edema.      I have personally reviewed the examination and initial interpretation   and I agree with the findings.      SHIVANI JUSTIN MD            SYSTEM ID:  [id number]             Critical care was performed.   Critical Care Addendum  My initial assessment, based on my review of nursing observations, review of vital signs, focused history, physical exam, review of cardiac rhythm monitor, and discussion with hepatology and intensivist , established a high suspicion that Mary Lopez has sepsis with indication for early goal-directed therapy and severe hypotension, which requires immediate intervention, and therefore he is critically ill.     After the initial assessment, the care team initiated multiple lab tests, initiated IV fluid administration, initiated medication therapy with broad spectrum antibiotics (vanc + cefepime), norepinephrine, achieved central venous access, and consulted with hepatology and intensivist  to provide stabilization care. Due to the critical nature of this patient, I reassessed nursing observations, vital signs, physical exam, review of cardiac rhythm monitor, mental status,  neurologic status, and respiratory status multiple times prior to his disposition.     Time also spent performing documentation, discussion with family to obtain medical information for decision making, reviewing test results, discussion with consultants, and coordination of care.     Critical care time (excluding teaching time and procedures): 65 minutes.     Assessment & Plan      Mary Lopez is a 53 year old male with PMH notable for anemia in the settings of end-stage liver disease 2/2 alcoholic liver cirrhosis, pulmonary hypertension, DM2 with long-term use of insulin who presents to the Emergency Department due to altered mental status. In the emergency department, he is febrile, hypotensive and tachycardic, concerning for sepsis. IV fluids and broad spectrum antibiotics initiated. Patient given tylenol for fever with improvement. Vital signs improved with IV fluids however patient remained hypotensive with low MAPs. Gastroenterology consulted and evaluated patient, agree with resuscitative measures for presumed sepsis of unclear etiology at this time. Labs sent and LFTS, creatinine, lactate elevated. Due to ongoing hypotension after fluid resuscitation, I discussed and obtained consent from patient for central line placement which was placed in the patient's right internal jugular vein, which patient tolerated well. Norepinephrine started for septic shock management. Blood cultures sent and pending. Patient admitted to the intensive care unit for further management.     I have reviewed the nursing notes. I have reviewed the findings, diagnosis, plan and need for follow up with the patient.    New Prescriptions    No medications on file       Final diagnoses:   Sepsis with acute liver failure and septic shock without hepatic coma, due to unspecified organism (H)   ANGEL (acute kidney injury) (H24)   Acute liver failure without hepatic coma       IArmani, am serving as a trained medical scribe  to document services personally performed by Александр Patel MD based on the provider's statements to me on February 23, 2024.  This document has been checked and approved by the attending provider.    I, Александр Patel MD, was physically present and have reviewed and verified the accuracy of this note documented by Armani Lopez medical scribe.      Александр aPtel MD    [facility] EMERGENCY DEPARTMENT  2/23/2024           Александр Patel MD  03/02/24 0900

## 2024-02-23 NOTE — H&P
MEDICAL ICU H&P  02/23/2024    Date of Hospital Admission: 02/23/24  Date of ICU Admission: 02/23/24  Reason for Critical Care Admission: sepsis and altered mental status  Date of Service (when I saw the patient): 02/23/2024    ASSESSMENT: Mary Lopez is a 53 year old male with a history of EtOH cirrhosis (sober since 6/25/23) c/b ascites, hepatic encephalopathy and grade I esophageal varices, T2DM, with recent hospitalization (1/2024) for encephalopathy and suspected SBP (no safe window to complete para) was scheduled for possible liver transplant on 2/15 but found to be COVID+ and donor liver ineligible for transplant, presented to the ED on 2/23 with AMS and encephalopathy, found to be febrile and hypotensive and admitted to MICU with concern for septic shock requiring pressors.    PLAN:    Neuro:  # Pain and sedation  - RASS goal 0 to -1    # Acute on Chronic Metabolic Encephalopathy  Pt with ESLD c/b hepatic encephalopathy, on lactulose and rifaximin. Ammonia level is mildly elevated at 64. Per chart review, pt seems to have a history of waxing and waning of mental status, last presented to hospital in 1/2024. This current acute change in mental status could also be secondary to septic shock vs hypoglycemic episode on 2/22.  - See below: cont PTA lactulose and rifaximin    # Alcohol Use Dependence, in remission  Sober since 06/25/2023.     Pulmonary:  No acute issue.    Cardiovascular:  #Septic shock  #Hypotension  Pt presented with AMS found to be febrile, tachycardiac, and hypotensive requiring pressor. Bcx 1/2 bottle came back positive for gram positive cocci concerning for septic shock. See ID section below.  - Levophed gtt  - MAP goal > 65  - Started Cefepime, Vanc as below  - Echo tomorrow to r/o endocarditis       GI/Nutrition:  #Decompensated cirrhosis 2/2 Alcohol Cirrhosis  #Alpha-1 antitrypsin MZ heterozygote, C282Y heterozygote   #Ascites  #Concerning for SBP  #Hepatic  Encephalopathy  #Grade I Esophageal Varices, no Hx of variceal bleed  Pt initially presented on 2/15 for possible liver transplant but found to be COVID+ and donor liver transplant was ineligible. Most recent EGD in 2023 found to have G1 EV/GOV Type1 with no hx of bleeding. History of suspected SBP in last admission (2024) but did not have window to complete paracentesis. Pt is on PTA lactulose and rifaximin for HE.   - GI consulted, appreciate recs   > currently on hold for liver transplant, timing for reactivation to be discuss with transplant surgery team  - PPI BID  - Hold Albumin given pt received 2L NS  - plan for diagnostic para if ascites window tomorrow   - Cont PTA Lactulose (goal of 3-4 BM per day)  - Cont rifaximin 550mg BID  - Cont PTA folate and increased to high dose thiamine    MELD 3.0: 45 at 2024 10:29 AM  MELD-Na: 41 at 2024 10:29 AM  Calculated from:  Serum Creatinine: 2.51 mg/dL at 2024  9:28 AM  Serum Sodium: 123 mmol/L (Using min of 125 mmol/L) at 2024  9:28 AM  Total Bilirubin: 11.1 mg/dL at 2024  9:28 AM  Serum Albumin: 2.4 g/dL at 2024  9:28 AM  INR(ratio): 4.89 at 2024 10:29 AM  Age at listin years  Sex: Male at 2024 10:29 AM    #Severe protein malnutrition  #Hypoalbuminemia  - consider Nutrition consultation   - low sodium diet     Renal/Fluids/Electrolytes:  #ANGEL  Cr elevated to 2.5, Baseline-0.6-1.2s. Likely pre renal in setting of septic shock, decreased oral intake, and dehydration. S/p 2L NS in ED and currently on pressor.  - trend BMP    #NAGMA  #Lactic acidosis   #Hyperkalemia   Lactic acid elevated to 4.5, down trending to 3.5 s/p 2L NS. Most likely secondary to septic shock with decreased perfusion. Concurrent liver and renal comorbidities likely impairing clearance.  - Monitoring lactate    #Hyponatremia  #Chronic Hyponatremia (bl 127-133)  Baseline Na in the low 130's, current Na 123 potentially secondary to hypovolemia and  sepsis. S/p 2L NS in ED.  - trend BMP    Endocrine:  # Type 2 DM  # Concerning for hypoglycemic episode  Pt takes PTA Tresiba 32 unit in the AM, 13 units lispro TID with meals. Per discussion with wife and pt at bedside, pt's glucose was 300 on night of 2/22 and dropped to the 40s after receiving his homology. Glucose is in the mid 150s here. Consider diabetes education during this admission.  -High insulin sliding scale  -Holding on PTA Metformin, Lispro TID 13-21 units, Tresiba 32U  -Hypoglycemic protocol    ID:  # Septic Shock  # Gram pos cocci bacteremia  # Concerning for SBP   Pt presented with 1-day of fever and fatigue, found to be febrile, tachycardiac, hypotensive, elevated WBC, Bcx 1/2 bottle gram posi cocci concerning for septic shock. Hx of ascites and suspected SBP in last hospitalization though unable to obtain para, this potentially could be SBP.   - started empiric Cefepime (2/23-p), Vanc (2/23-p) renally dosed  - MRSA swab, UA pending, Ucx pending  - POCUS for possible diagnostic paracentesis tomorrow  - 2/23 Bcx 1/2 bottles gram positive cocci    Hematology:    #Leukocytosis  In setting of infection, as above    #Macrocytic anemia   Most likely multifactorial in setting of end stage liver disease. Hgb 6.6 on admission. Hgb baselines 7-8s  -s/p 1 pRBCs  - Repeat Hgb  - transfuse if Hgb <7    #Thrombocytopenia  #Cogulopathy  INR of 4.18. Most likely multifactorial in setting of end stage liver disease.  - gave vitamin K  - transfuse if plt <20    Musculoskeletal:  # R shoulder tear  Pt had a fall in October and now has pain in shoulder. Was told not a candidate for surgery, recently started hydrocodone on 2/19.  - hold PTA hydrocodone   - Voltaren gel     Skin:  No acute issue     General Cares/Prophylaxis:    DVT Prophylaxis: Pneumatic Compression Devices  GI Prophylaxis: PPI  Restraints: none  Family Communication: updated patient and wife at bedside  Code Status: full     Lines/tubes/drains:  -  "RIJ, PIV    Disposition:  - Medical ICU    Patient seen and findings/plan discussed with medical ICU staff, Dr. Herrera.    Keely Grimaldo, MS4    Resident/Fellow Attestation   I, Rocio Talley MD, was present with the medical/JOSELYN student who participated in the service and in the documentation of the note.  I have verified the history and personally performed the physical exam and medical decision making.  I agree with the assessment and plan of care as documented in the note.      Rocio Talley MD  PGY1  Date of Service (when I saw the patient): 02/23/24      Clinically Significant Risk Factors Present on Admission        # Hyperkalemia: Highest K = 5.5 mmol/L in last 2 days, will monitor as appropriate  # Hyponatremia: Lowest Na = 123 mmol/L in last 2 days, will monitor as appropriate      # Hypoalbuminemia: Lowest albumin = 2.4 g/dL at 2/23/2024  9:28 AM, will monitor as appropriate  # Coagulation Defect: INR = 4.89 (Ref range: 0.85 - 1.15) and/or PTT = 66 Seconds (Ref range: 22 - 38 Seconds), will monitor for bleeding  # Thrombocytopenia: Lowest platelets = 95 in last 2 days, will monitor for bleeding  # Acute Kidney Injury, unspecified: based on a >150% or 0.3 mg/dL increase in last creatinine compared to past 90 day average, will monitor renal function  # Hypertension: Noted on problem list   # Circulatory Shock: required vasopressors within past 24 hours      # DMII: A1C = 7.7 % (Ref range: <5.7 %) within past 6 months    # Obesity: Estimated body mass index is 31.93 kg/m  as calculated from the following:    Height as of this encounter: 1.727 m (5' 8\").    Weight as of this encounter: 95.3 kg (210 lb).           # Anemia: based on hgb <11           -----------------------------------------------------------------------    HISTORY PRESENTING ILLNESS: Mary Lopez is a 53 year old male with a history of EtOH cirrhosis (sober since 6/25/23) c/b ascites, hepatic encephalopathy and grade I esophageal " varices, T2DM, with recent hospitalization (1/2024) for encephalopathy and suspected SBP (no safe window to complete para) was scheduled for possible liver transplant on 2/15 but found to be COVID+ and donor liver ineligible for transplant, presented to the ED on 2/23 with AMS and encephalopathy, found to be febrile and hypotensive and admitted to MICU with concern for septic shock requiring pressors.    Per discussion with patient and wife at bedside, patient has been more confused and confused since yesterday (2/22) morning. At night of 2/22, glucose was 300 but dropped to 40s after getting his homolog and was given juice, did not recheck glucose after that. This morning (2/23), wife noticed patient was continued to be confused so they decided to bring him to the ED.     In the emergency department, he is febrile, hypotensive and tachycardic.  He has no localizing symptoms, no cough, no nausea, no vomiting, or abnormal diarrhea (patient is taking lactulose).  Notably, he did test positive for COVID on 2/15/2024.    REVIEW OF SYSTEMS: as listed in HPI     PAST MEDICAL HISTORY:   Past Medical History:   Diagnosis Date    ANGEL (acute kidney injury) (H24)     Alcoholic cirrhosis of liver without ascites (H) 07/13/2023    Alcoholic hepatitis with ascites (H28) 10/03/2023    Alcoholic hepatitis without ascites (H28) 07/13/2023    Closed fracture of one rib of left side 09/14/2023    Concussion without loss of consciousness 03/11/2020    Decompensated hepatic cirrhosis (H) 09/15/2023    Diabetes mellitus, type 2 (H) 11/22/2023    Essential hypertension 03/11/2020    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)     Mild hyperlipidemia 12/07/2021    Persistent insomnia 07/13/2023    Portal hypertension (H) 07/13/2023    Scrotal abscess     Secondary esophageal 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 (H) 07/13/2023    Tobacco abuse disorder 03/11/2020    Type 2 diabetes mellitus with hyperglycemia (H) 12/22/2023     SURGICAL  HISTORY:  Past Surgical History:   Procedure Laterality Date    CHOLECYSTECTOMY      COLONOSCOPY N/A 1/2/2024    Procedure: COLONOSCOPY, WITH POLYPECTOMY;  Surgeon: Jak Urbina MD;  Location: [facility]    CV RIGHT HEART CATH MEASUREMENTS RECORDED N/A 1/30/2024    Procedure: Right Heart Catheterization;  Surgeon: Alfred Tafoya MD;  Location:  HEART CARDIAC CATH LAB    TONSILLECTOMY      VASECTOMY       SOCIAL HISTORY:  Social History     Socioeconomic History    Marital status:    Occupational History    Occupation: Not working now   Tobacco Use    Smoking status: Former     Types: Cigarettes     Passive exposure: Never    Smokeless tobacco: Current     Types: Chew     Last attempt to quit: 1/1/2004    Tobacco comments:     Chew daily 1/3 of a tin per day   Vaping Use    Vaping Use: Never used   Substance and Sexual Activity    Alcohol use: Not Currently     Alcohol/week: 12.0 standard drinks of alcohol     Types: 12 Standard drinks or equivalent per week     Comment: Sober since 6/25/2023    Drug use: Not Currently    Sexual activity: Yes     Partners: Female     Birth control/protection: Male Surgical   Other Topics Concern    Parent/sibling w/ CABG, MI or angioplasty before 65F 55M? No     Social Determinants of Health     Financial Resource Strain: Low Risk  (12/22/2023)    Financial Resource Strain     Within the past 12 months, have you or your family members you live with been unable to get utilities (heat, electricity) when it was really needed?: No   Food Insecurity: Low Risk  (12/22/2023)    Food Insecurity     Within the past 12 months, did you worry that your food would run out before you got money to buy more?: No     Within the past 12 months, did the food you bought just not last and you didn t have money to get more?: No   Transportation Needs: Low Risk  (12/22/2023)    Transportation Needs     Within the past 12 months, has lack of transportation kept you from medical appointments,  getting your medicines, non-medical meetings or appointments, work, or from getting things that you need?: No   Interpersonal Safety: Low Risk  (12/22/2023)    Interpersonal Safety     Do you feel physically and emotionally safe where you currently live?: Yes     Within the past 12 months, have you been hit, slapped, kicked or otherwise physically hurt by someone?: No     Within the past 12 months, have you been humiliated or emotionally abused in other ways by your partner or ex-partner?: No   Housing Stability: Low Risk  (12/22/2023)    Housing Stability     Do you have housing? : Yes     Are you worried about losing your housing?: No     FAMILY HISTORY:   Family History   Problem Relation Age of Onset    Anxiety Disorder Mother     Depression Mother     Bipolar Disorder Mother     Chronic Obstructive Pulmonary Disease Mother     Lung Cancer Mother 81    Morbid Obesity Father     Diabetes Father     Diabetes Type 2  Brother     Substance Abuse Maternal Grandfather     Substance Abuse Paternal Grandfather     Colon Cancer No family hx of     Liver Disease No family hx of      ALLERGIES:   Allergies   Allergen Reactions    Food Anaphylaxis     baked beans    Fish Allergy     Lactose GI Disturbance    Pineapple Itching     MEDICATIONS:  No current facility-administered medications on file prior to encounter.  blood glucose (NO BRAND SPECIFIED) test strip, Use to test blood sugar two times daily or as directed. To accompany: Blood Glucose Monitor Brands: per insurance.  blood glucose monitoring (NO BRAND SPECIFIED) meter device kit, Use to test blood sugar twice times daily or as directed. Preferred blood glucose meter OR supplies to accompany: Blood Glucose Monitor Brands: per insurance.  Continuous Blood Gluc Sensor (DEXCOM G7 SENSOR) MISC, Change every 10 days.  cyclobenzaprine (FLEXERIL) 10 MG tablet, Take 1 tablet (10 mg) by mouth 3 times daily as needed for muscle spasms  doxepin (SINEQUAN) 10 MG capsule, TAKE 1  CAPSULE(10 MG) BY MOUTH AT BEDTIME  folic acid (FOLVITE) 1 MG tablet, Take 1 tablet (1 mg) by mouth daily  HYDROcodone-acetaminophen (NORCO) 5-325 MG tablet, Take 1 tablet by mouth every 12 hours as needed for severe pain  insulin degludec (TRESIBA FLEXTOUCH) 100 UNIT/ML pen, Inject 40-50 Units Subcutaneous daily Inject 40 units daily.  Increase by 1 unit daily until fasting BG most often <150 as long as this does not lead to overnight or early morning low BG <70.  Insulin Lispro (HUMALOG KWIKPEN) 200 UNIT/ML soln, Inject 10-18 Units Subcutaneous 4 times daily (with meals and nightly) Give 10 units plus sliding scale to correct any premeal blood sugars >175.  insulin pen needle (BD PEN NEEDLE SHERRIE 2ND GEN) 32G X 4 MM miscellaneous, USES 5 PER DAY  lactulose (CHRONULAC) 10 GM/15ML solution, TAKE 30 MLS BY MOUTH 2 TIMES DAILY  melatonin 10 MG TABS tablet, Take 1 tablet (10 mg) by mouth nightly as needed for sleep  metFORMIN (GLUCOPHAGE XR) 500 MG 24 hr tablet, TAKE 1 TABLET(500 MG) BY MOUTH DAILY WITH DINNER  multivitamin w/minerals (THERA-VIT-M) tablet, TAKE 1 TABLET BY MOUTH DAILY  omeprazole (PRILOSEC) 20 MG DR capsule, Take 1 capsule (20 mg) by mouth 2 times daily  potassium chloride ER (KLOR-CON M) 10 MEQ CR tablet, Take 2 tablets (20 mEq) by mouth 2 times daily  rifaximin (XIFAXAN) 550 MG TABS tablet, Take 1 tablet (550 mg) by mouth 2 times daily for 180 days  spironolactone (ALDACTONE) 25 MG tablet, Take 1 tablet (25 mg) by mouth daily  thin (NO BRAND SPECIFIED) lancets, Use with lanceting device. To accompany: Blood Glucose Monitor Brands: per insurance.  torsemide (DEMADEX) 10 MG tablet, Take 3 tablets (30 mg) by mouth daily  zinc oxide (DESITIN) 20 % external ointment, Apply topically as needed for dry skin or irritation        PHYSICAL EXAMINATION:  Temp:  [99  F (37.2  C)-101.7  F (38.7  C)] 99  F (37.2  C)  Pulse:  [] 106  Resp:  [11-28] 15  BP: (60-99)/(36-56) 96/56  SpO2:  [92 %-99 %] 92  %  General: laying in bed, not in acute distress  HEENT: atraumatic, normocephalic, scleral icterus  Neuro: alert, oriented to person, place, time; not situation. PERRL, no asterixis, unable to life R upper extremity beyond 90 degree due to pain   Pulm/Resp: Clear breath sounds bilaterally without rhonchi, crackles or wheeze, breathing non-labored  CV: RRR  Abdomen: Soft, distended, mild tenderness to palpation in RUQ  : deferred  Incisions/Skin: jaundice, spider angioma on chest     LABS: Reviewed.   Arterial Blood Gases   No lab results found in last 7 days.  Complete Blood Count   Recent Labs   Lab 02/23/24  0928 02/18/24  1202   WBC 28.6* 16.4*   HGB 6.6* 7.2*   PLT 95* 85*     Basic Metabolic Panel  Recent Labs   Lab 02/23/24  0931 02/23/24  0928 02/18/24  1202   NA  --  123* 123*   POTASSIUM  --  5.5* 4.3   CHLORIDE  --  91* 90*   CO2  --  21* 24   BUN  --  35.5* 22.0*   CR  --  2.51* 1.21*   * 141* 286*     Liver Function Tests  Recent Labs   Lab 02/23/24  1029 02/23/24  0928   AST  --  56*   ALT  --  25   ALKPHOS  --  191*   BILITOTAL  --  11.1*   ALBUMIN  --  2.4*   INR 4.89* 4.18*     Coagulation Profile  Recent Labs   Lab 02/23/24  1029 02/23/24  0928   INR 4.89* 4.18*   PTT  --  66*       IMAGING:  Recent Results (from the past 24 hour(s))   XR Chest Port 1 View    Narrative    Exam: XR CHEST PORT 1 VIEW, 2/23/2024 9:41 AM    Comparison: 2/15/2024    History: hypoxia, fever, cough    Findings:  Single AP portable semiupright view of the chest.    Trachea is midline. Stable cardiomediastinal silhouette. Unchanged  elevation of the right hemidiaphragm. Low lung volumes. Mild diffuse  interstitial opacities. Left basilar opacity at the costophrenic  angle. There is no pneumothorax or pleural effusion. Right upper  quadrant surgical clips.      Impression    Impression:   1. Retrocardiac opacities could represent infection/atelectasis.  2. Low lung volumes and findings of pulmonary edema.    I have  personally reviewed the examination and initial interpretation  and I agree with the findings.    SHIVANI JUSTIN MD         SYSTEM ID:  [id number]   CT Abdomen Pelvis w/o Contrast    Narrative    EXAMINATION: CT ABDOMEN PELVIS W/O CONTRAST, 2/23/2024 11:20 AM    INDICATION: abd distention and pain, liver cirrhosis, fever,    COMPARISON STUDY: CT 2/15/2024, 2/6/2024    TECHNIQUE: CT scan of the abdomen and pelvis was performed without  contrast on multidetector CT scanner using volumetric acquisition  technique and images were reconstructed in multiple planes with  variable thickness and reviewed on dedicated workstations.     CT scan radiation dose is optimized to minimum requisite dose using  automated dose modulation techniques.    FINDINGS:    Lower thorax: Trace left pleural effusion with overlying consolidative  opacity containing air bronchograms. Paraesophageal varices.    Abdomen/pelvis:  Liver: Cirrhotic morphology of the liver. No intrahepatic biliary  ductal dilation.    Biliary System: The gallbladder is surgically absent. No extrahepatic  biliary ductal dilation.    Pancreas: No mass or pancreatic ductal dilation.    Adrenal glands: The right adrenal gland is normal. The left adrenal  gland is difficult to characterize given adjacent splenic varices.    Spleen: Enlarged measuring approximately 18 cm in greatest axial  dimension    Kidneys: No obstructing calculus or hydronephrosis.    Gastrointestinal tract : Normal caliber small bowel.    Mesentery/peritoneum/retroperitoneum: No mass. No free air.  Small to  moderate volume ascites. Fat and fluid containing right inguinal  hernia.    Lymph nodes: No significant lymphadenopathy.    Vasculature: Patency of the major intra-abdominal vasculature cannot  be assessed on this noncontrast exam. Normal caliber aorta.  Paraesophageal and splenic varices as noted above.    Pelvis: Urinary bladder is normal.  The prostate is normal in size.    Osseous structures: No  aggressive or acute osseous lesion.  Mild  degenerative changes of the spine most significant at L5-S1.      Soft tissues: Bilateral gynecomastia.  Diffuse subcutaneous anasarca.      Impression    IMPRESSION:   1. Cirrhotic morphology of the liver with sequela of portal  hypertension including splenomegaly and perisplenic/esophageal  varices. Small volume ascites.    2. No definite acute pathology is appreciated in the abdomen on this  noncontrast exam.    3. Trace left pleural effusion and overlying consolidative opacity  favored to represent atelectasis.    I have personally reviewed the examination and initial interpretation  and I agree with the findings.    JULIO JENKINS MD         SYSTEM ID:  [id number]   XR Chest Port 1 View    Impression    RESIDENT PRELIMINARY INTERPRETATION  Impression:   1. Interval placement of right IJ central venous catheter with tip  projecting over the right atrium.  2. Stable retrocardiac opacities which could represent  infection/atelectasis.  3. Similar low lung volumes and findings of pulmonary edema.

## 2024-02-23 NOTE — PROGRESS NOTES
Admitted/transferred from: [facility]  Reason for admission/transfer: Sepsis  Patient status upon admission/transfer: Stable. On 1 pressor.   Interventions: Pressor started, MAP goal >65.  Plan: Monitor infection. Monitor pressor.  2 RN skin assessment: completed by Radha PEREZ RN and Valentina AMADOR RN  Sexual Orientation and Gender Identity (SOGI) smartfom completed: Done  Result of skin assessment and interventions/actions: chest and back petechiae rash, generalized abdominal bruising, skin dryness.   Height, weight, drug calc weight: Done  Patient belongings (see Flowsheet - Adult Profile for details): Clothing, phone, phone   MDRO education (if applicable):  Completed. Negative COVID test today, no isolation per team.

## 2024-02-23 NOTE — NURSING NOTE
Is the patient currently in the state of [place]? YES    Visit mode:VIDEO    If the visit is dropped, the patient can be reconnected by: VIDEO VISIT: Text to cell phone:   Telephone Information:   Mobile  [phone]       Will anyone else be joining the visit? Spouse will be joining  (If patient encounters technical issues they should call [phone])    How would you like to obtain your AVS? MyChart    Are changes needed to the allergy or medication list? No spouse noted lists should be accurate so VF did not review it again with her.     Reason for visit: RECHECK (Wife notes major low last night. Last blood glucose level was 80 but dexcom reading low )    Autumn CHRISTY

## 2024-02-24 ENCOUNTER — APPOINTMENT (OUTPATIENT)
Dept: CARDIOLOGY | Facility: CLINIC | Age: 54
DRG: 871 | End: 2024-02-24
Payer: COMMERCIAL

## 2024-02-24 LAB
ACANTHOCYTES BLD QL SMEAR: NORMAL
ALBUMIN SERPL BCG-MCNC: 2.4 G/DL (ref 3.5–5.2)
ALP SERPL-CCNC: 207 U/L (ref 40–150)
ALT SERPL W P-5'-P-CCNC: 26 U/L (ref 0–70)
ANION GAP SERPL CALCULATED.3IONS-SCNC: 9 MMOL/L (ref 7–15)
ANION GAP SERPL CALCULATED.3IONS-SCNC: 9 MMOL/L (ref 7–15)
APTT PPP: 67 SECONDS (ref 22–38)
AST SERPL W P-5'-P-CCNC: 59 U/L (ref 0–45)
AUER BODIES BLD QL SMEAR: NORMAL
BASO STIPL BLD QL SMEAR: NORMAL
BILIRUB SERPL-MCNC: 11.7 MG/DL
BITE CELLS BLD QL SMEAR: NORMAL
BLD PROD TYP BPU: NORMAL
BLISTER CELLS BLD QL SMEAR: NORMAL
BLOOD COMPONENT TYPE: NORMAL
BUN SERPL-MCNC: 42.5 MG/DL (ref 6–20)
BUN SERPL-MCNC: 43.5 MG/DL (ref 6–20)
BURR CELLS BLD QL SMEAR: NORMAL
CALCIUM SERPL-MCNC: 7.8 MG/DL (ref 8.6–10)
CALCIUM SERPL-MCNC: 8 MG/DL (ref 8.6–10)
CF REDUC 60M P MA LENFR BLD TEG: 0.9 % (ref 0–15)
CFT BLD TEG: 4.7 MINUTE (ref 1–3)
CHLORIDE SERPL-SCNC: 94 MMOL/L (ref 98–107)
CHLORIDE SERPL-SCNC: 96 MMOL/L (ref 98–107)
CI (COAGULATION INDEX)(Z) NON NATIVE: -6.6 (ref -3–3)
CLOT ANGLE BLD TEG: 49 DEGREES (ref 53–72)
CLOT INIT BLD TEG: 7.1 MINUTE (ref 5–10)
CLOT LYSIS 30M P MA LENFR BLD TEG: 0 % (ref 0–8)
CLOT STRENGTH BLD TEG: 2.3 KD/SC (ref 4.5–11)
CODING SYSTEM: NORMAL
CREAT SERPL-MCNC: 1.88 MG/DL (ref 0.67–1.17)
CREAT SERPL-MCNC: 2.16 MG/DL (ref 0.67–1.17)
CROSSMATCH: NORMAL
CROSSMATCH: NORMAL
DACRYOCYTES BLD QL SMEAR: NORMAL
DEPRECATED HCO3 PLAS-SCNC: 21 MMOL/L (ref 22–29)
DEPRECATED HCO3 PLAS-SCNC: 21 MMOL/L (ref 22–29)
EGFRCR SERPLBLD CKD-EPI 2021: 36 ML/MIN/1.73M2
EGFRCR SERPLBLD CKD-EPI 2021: 42 ML/MIN/1.73M2
ELLIPTOCYTES BLD QL SMEAR: NORMAL
ERYTHROCYTE [DISTWIDTH] IN BLOOD BY AUTOMATED COUNT: 15.6 % (ref 10–15)
ERYTHROCYTE [DISTWIDTH] IN BLOOD BY AUTOMATED COUNT: 15.6 % (ref 10–15)
FIBRINOGEN PPP-MCNC: 61 MG/DL (ref 170–490)
FIBRINOGEN PPP-MCNC: <61 MG/DL (ref 170–490)
FRAGMENTS BLD QL SMEAR: NORMAL
GLUCOSE BLDC GLUCOMTR-MCNC: 248 MG/DL (ref 70–99)
GLUCOSE BLDC GLUCOMTR-MCNC: 266 MG/DL (ref 70–99)
GLUCOSE BLDC GLUCOMTR-MCNC: 279 MG/DL (ref 70–99)
GLUCOSE BLDC GLUCOMTR-MCNC: 289 MG/DL (ref 70–99)
GLUCOSE BLDC GLUCOMTR-MCNC: 298 MG/DL (ref 70–99)
GLUCOSE BLDC GLUCOMTR-MCNC: 302 MG/DL (ref 70–99)
GLUCOSE BLDC GLUCOMTR-MCNC: 314 MG/DL (ref 70–99)
GLUCOSE BLDC GLUCOMTR-MCNC: 342 MG/DL (ref 70–99)
GLUCOSE SERPL-MCNC: 241 MG/DL (ref 70–99)
GLUCOSE SERPL-MCNC: 355 MG/DL (ref 70–99)
HCT VFR BLD AUTO: 19.8 % (ref 40–53)
HCT VFR BLD AUTO: 21.7 % (ref 40–53)
HGB BLD-MCNC: 6.8 G/DL (ref 13.3–17.7)
HGB BLD-MCNC: 7 G/DL (ref 13.3–17.7)
HGB BLD-MCNC: 7.5 G/DL (ref 13.3–17.7)
HGB C CRYSTALS: NORMAL
HOWELL-JOLLY BOD BLD QL SMEAR: NORMAL
INR PPP: 4.83 (ref 0.85–1.15)
ISSUE DATE AND TIME: NORMAL
LACTATE SERPL-SCNC: 2.6 MMOL/L (ref 0.7–2)
LVEF ECHO: NORMAL
MAGNESIUM SERPL-MCNC: 1.5 MG/DL (ref 1.7–2.3)
MAGNESIUM SERPL-MCNC: 2 MG/DL (ref 1.7–2.3)
MCF BLD TEG: 31.3 MM (ref 50–70)
MCH RBC QN AUTO: 33.2 PG (ref 26.5–33)
MCH RBC QN AUTO: 34.6 PG (ref 26.5–33)
MCHC RBC AUTO-ENTMCNC: 34.3 G/DL (ref 31.5–36.5)
MCHC RBC AUTO-ENTMCNC: 34.6 G/DL (ref 31.5–36.5)
MCV RBC AUTO: 100 FL (ref 78–100)
MCV RBC AUTO: 97 FL (ref 78–100)
NEUTS HYPERSEG BLD QL SMEAR: NORMAL
PHOSPHATE SERPL-MCNC: 4.2 MG/DL (ref 2.5–4.5)
PHOSPHATE SERPL-MCNC: 4.7 MG/DL (ref 2.5–4.5)
PLAT MORPH BLD: NORMAL
PLATELET # BLD AUTO: 110 10E3/UL (ref 150–450)
PLATELET # BLD AUTO: 45 10E3/UL (ref 150–450)
POLYCHROMASIA BLD QL SMEAR: NORMAL
POTASSIUM SERPL-SCNC: 4.2 MMOL/L (ref 3.4–5.3)
POTASSIUM SERPL-SCNC: 5.7 MMOL/L (ref 3.4–5.3)
PROT SERPL-MCNC: 6.3 G/DL (ref 6.4–8.3)
RBC # BLD AUTO: 2.05 10E6/UL (ref 4.4–5.9)
RBC # BLD AUTO: 2.17 10E6/UL (ref 4.4–5.9)
RBC AGGLUT BLD QL: NORMAL
RBC MORPH BLD: NORMAL
ROULEAUX BLD QL SMEAR: NORMAL
SICKLE CELLS BLD QL SMEAR: NORMAL
SMUDGE CELLS BLD QL SMEAR: NORMAL
SODIUM SERPL-SCNC: 124 MMOL/L (ref 135–145)
SODIUM SERPL-SCNC: 126 MMOL/L (ref 135–145)
SPHEROCYTES BLD QL SMEAR: NORMAL
STOMATOCYTES BLD QL SMEAR: NORMAL
TARGETS BLD QL SMEAR: NORMAL
TOXIC GRANULES BLD QL SMEAR: NORMAL
UNIT ABO/RH: NORMAL
UNIT NUMBER: NORMAL
UNIT STATUS: NORMAL
UNIT TYPE ISBT: 5100
UNIT TYPE ISBT: 600
VANCOMYCIN SERPL-MCNC: 9.7 UG/ML
VARIANT LYMPHS BLD QL SMEAR: NORMAL
WBC # BLD AUTO: 17.4 10E3/UL (ref 4–11)
WBC # BLD AUTO: 37.1 10E3/UL (ref 4–11)

## 2024-02-24 PROCEDURE — P9037 PLATE PHERES LEUKOREDU IRRAD: HCPCS

## 2024-02-24 PROCEDURE — 85610 PROTHROMBIN TIME: CPT

## 2024-02-24 PROCEDURE — 250N000013 HC RX MED GY IP 250 OP 250 PS 637

## 2024-02-24 PROCEDURE — 85018 HEMOGLOBIN: CPT

## 2024-02-24 PROCEDURE — 250N000011 HC RX IP 250 OP 636: Mod: JZ

## 2024-02-24 PROCEDURE — 82310 ASSAY OF CALCIUM: CPT | Performed by: STUDENT IN AN ORGANIZED HEALTH CARE EDUCATION/TRAINING PROGRAM

## 2024-02-24 PROCEDURE — 87040 BLOOD CULTURE FOR BACTERIA: CPT | Performed by: STUDENT IN AN ORGANIZED HEALTH CARE EDUCATION/TRAINING PROGRAM

## 2024-02-24 PROCEDURE — 99232 SBSQ HOSP IP/OBS MODERATE 35: CPT | Performed by: STUDENT IN AN ORGANIZED HEALTH CARE EDUCATION/TRAINING PROGRAM

## 2024-02-24 PROCEDURE — 250N000011 HC RX IP 250 OP 636: Performed by: EMERGENCY MEDICINE

## 2024-02-24 PROCEDURE — 93325 DOPPLER ECHO COLOR FLOW MAPG: CPT | Mod: 26 | Performed by: INTERNAL MEDICINE

## 2024-02-24 PROCEDURE — 83605 ASSAY OF LACTIC ACID: CPT | Performed by: STUDENT IN AN ORGANIZED HEALTH CARE EDUCATION/TRAINING PROGRAM

## 2024-02-24 PROCEDURE — 93325 DOPPLER ECHO COLOR FLOW MAPG: CPT

## 2024-02-24 PROCEDURE — 84100 ASSAY OF PHOSPHORUS: CPT | Performed by: STUDENT IN AN ORGANIZED HEALTH CARE EDUCATION/TRAINING PROGRAM

## 2024-02-24 PROCEDURE — 83735 ASSAY OF MAGNESIUM: CPT | Performed by: STUDENT IN AN ORGANIZED HEALTH CARE EDUCATION/TRAINING PROGRAM

## 2024-02-24 PROCEDURE — 85240 CLOT FACTOR VIII AHG 1 STAGE: CPT

## 2024-02-24 PROCEDURE — P9012 CRYOPRECIPITATE EACH UNIT: HCPCS

## 2024-02-24 PROCEDURE — P9016 RBC LEUKOCYTES REDUCED: HCPCS

## 2024-02-24 PROCEDURE — 85396 CLOTTING ASSAY WHOLE BLOOD: CPT

## 2024-02-24 PROCEDURE — 85730 THROMBOPLASTIN TIME PARTIAL: CPT

## 2024-02-24 PROCEDURE — 250N000009 HC RX 250: Performed by: EMERGENCY MEDICINE

## 2024-02-24 PROCEDURE — 250N000013 HC RX MED GY IP 250 OP 250 PS 637: Performed by: STUDENT IN AN ORGANIZED HEALTH CARE EDUCATION/TRAINING PROGRAM

## 2024-02-24 PROCEDURE — 99291 CRITICAL CARE FIRST HOUR: CPT | Mod: GC | Performed by: INTERNAL MEDICINE

## 2024-02-24 PROCEDURE — 36415 COLL VENOUS BLD VENIPUNCTURE: CPT | Performed by: STUDENT IN AN ORGANIZED HEALTH CARE EDUCATION/TRAINING PROGRAM

## 2024-02-24 PROCEDURE — 85027 COMPLETE CBC AUTOMATED: CPT

## 2024-02-24 PROCEDURE — 80202 ASSAY OF VANCOMYCIN: CPT | Performed by: EMERGENCY MEDICINE

## 2024-02-24 PROCEDURE — P9047 ALBUMIN (HUMAN), 25%, 50ML: HCPCS

## 2024-02-24 PROCEDURE — 85384 FIBRINOGEN ACTIVITY: CPT

## 2024-02-24 PROCEDURE — 258N000003 HC RX IP 258 OP 636

## 2024-02-24 PROCEDURE — 93308 TTE F-UP OR LMTD: CPT | Mod: 26 | Performed by: INTERNAL MEDICINE

## 2024-02-24 PROCEDURE — 200N000002 HC R&B ICU UMMC

## 2024-02-24 PROCEDURE — 250N000012 HC RX MED GY IP 250 OP 636 PS 637

## 2024-02-24 PROCEDURE — 258N000003 HC RX IP 258 OP 636: Performed by: STUDENT IN AN ORGANIZED HEALTH CARE EDUCATION/TRAINING PROGRAM

## 2024-02-24 PROCEDURE — 80053 COMPREHEN METABOLIC PANEL: CPT

## 2024-02-24 PROCEDURE — 250N000011 HC RX IP 250 OP 636: Performed by: STUDENT IN AN ORGANIZED HEALTH CARE EDUCATION/TRAINING PROGRAM

## 2024-02-24 PROCEDURE — 250N000011 HC RX IP 250 OP 636

## 2024-02-24 PROCEDURE — 93321 DOPPLER ECHO F-UP/LMTD STD: CPT | Mod: 26 | Performed by: INTERNAL MEDICINE

## 2024-02-24 RX ORDER — INSULIN LISPRO 100 [IU]/ML
10 INJECTION, SOLUTION INTRAVENOUS; SUBCUTANEOUS
Status: DISCONTINUED | OUTPATIENT
Start: 2024-02-24 | End: 2024-02-24

## 2024-02-24 RX ORDER — PANTOPRAZOLE SODIUM 40 MG/1
40 TABLET, DELAYED RELEASE ORAL
Status: DISCONTINUED | OUTPATIENT
Start: 2024-02-24 | End: 2024-03-02 | Stop reason: HOSPADM

## 2024-02-24 RX ORDER — LACTULOSE 10 G/15ML
20 SOLUTION ORAL 3 TIMES DAILY
Status: DISCONTINUED | OUTPATIENT
Start: 2024-02-24 | End: 2024-02-24

## 2024-02-24 RX ORDER — MAGNESIUM SULFATE HEPTAHYDRATE 40 MG/ML
2 INJECTION, SOLUTION INTRAVENOUS ONCE
Status: COMPLETED | OUTPATIENT
Start: 2024-02-24 | End: 2024-02-24

## 2024-02-24 RX ORDER — ALBUMIN (HUMAN) 12.5 G/50ML
25 SOLUTION INTRAVENOUS ONCE
Status: COMPLETED | OUTPATIENT
Start: 2024-02-24 | End: 2024-02-24

## 2024-02-24 RX ORDER — LACTULOSE 10 G/15ML
20 SOLUTION ORAL 3 TIMES DAILY
Status: DISCONTINUED | OUTPATIENT
Start: 2024-02-24 | End: 2024-03-02 | Stop reason: HOSPADM

## 2024-02-24 RX ADMIN — LACTULOSE 20 G: 20 SOLUTION ORAL at 09:18

## 2024-02-24 RX ADMIN — ALBUMIN HUMAN 25 G: 0.25 SOLUTION INTRAVENOUS at 13:49

## 2024-02-24 RX ADMIN — LACTULOSE 20 G: 20 SOLUTION ORAL at 20:08

## 2024-02-24 RX ADMIN — VANCOMYCIN HYDROCHLORIDE 1250 MG: 10 INJECTION, POWDER, LYOPHILIZED, FOR SOLUTION INTRAVENOUS at 15:42

## 2024-02-24 RX ADMIN — PHYTONADIONE 10 MG: 10 INJECTION, EMULSION INTRAMUSCULAR; INTRAVENOUS; SUBCUTANEOUS at 08:48

## 2024-02-24 RX ADMIN — THIAMINE HYDROCHLORIDE 500 MG: 100 INJECTION, SOLUTION INTRAMUSCULAR; INTRAVENOUS at 08:48

## 2024-02-24 RX ADMIN — MAGNESIUM SULFATE IN WATER 2 G: 40 INJECTION, SOLUTION INTRAVENOUS at 05:45

## 2024-02-24 RX ADMIN — FOLIC ACID 1 MG: 1 TABLET ORAL at 08:48

## 2024-02-24 RX ADMIN — RIFAXIMIN 550 MG: 550 TABLET ORAL at 08:48

## 2024-02-24 RX ADMIN — INSULIN ASPART 6 UNITS: 100 INJECTION, SOLUTION INTRAVENOUS; SUBCUTANEOUS at 08:56

## 2024-02-24 RX ADMIN — SODIUM ZIRCONIUM CYCLOSILICATE 10 G: 10 POWDER, FOR SUSPENSION ORAL at 06:23

## 2024-02-24 RX ADMIN — INSULIN DEGLUDEC 30 UNITS: 100 INJECTION, SOLUTION SUBCUTANEOUS at 09:32

## 2024-02-24 RX ADMIN — CEFEPIME HYDROCHLORIDE 2 G: 2 INJECTION, POWDER, FOR SOLUTION INTRAVENOUS at 08:50

## 2024-02-24 RX ADMIN — RIFAXIMIN 550 MG: 550 TABLET ORAL at 20:08

## 2024-02-24 RX ADMIN — LACTULOSE 20 G: 20 SOLUTION ORAL at 13:49

## 2024-02-24 RX ADMIN — SODIUM ZIRCONIUM CYCLOSILICATE 10 G: 10 POWDER, FOR SUSPENSION ORAL at 08:57

## 2024-02-24 RX ADMIN — NOREPINEPHRINE BITARTRATE 0.18 MCG/KG/MIN: 0.06 INJECTION, SOLUTION INTRAVENOUS at 04:00

## 2024-02-24 RX ADMIN — CEFEPIME HYDROCHLORIDE 2 G: 2 INJECTION, POWDER, FOR SOLUTION INTRAVENOUS at 22:20

## 2024-02-24 RX ADMIN — HYDROCORTISONE SODIUM SUCCINATE 50 MG: 100 INJECTION, POWDER, FOR SOLUTION INTRAMUSCULAR; INTRAVENOUS at 15:42

## 2024-02-24 RX ADMIN — HYDROCORTISONE SODIUM SUCCINATE 50 MG: 100 INJECTION, POWDER, FOR SOLUTION INTRAMUSCULAR; INTRAVENOUS at 09:14

## 2024-02-24 RX ADMIN — INSULIN ASPART 7 UNITS: 100 INJECTION, SOLUTION INTRAVENOUS; SUBCUTANEOUS at 11:58

## 2024-02-24 RX ADMIN — INSULIN ASPART 6 UNITS: 100 INJECTION, SOLUTION INTRAVENOUS; SUBCUTANEOUS at 17:44

## 2024-02-24 RX ADMIN — THIAMINE HYDROCHLORIDE 500 MG: 100 INJECTION, SOLUTION INTRAMUSCULAR; INTRAVENOUS at 13:49

## 2024-02-24 RX ADMIN — HYDROCORTISONE SODIUM SUCCINATE 50 MG: 100 INJECTION, POWDER, FOR SOLUTION INTRAMUSCULAR; INTRAVENOUS at 22:24

## 2024-02-24 RX ADMIN — THIAMINE HYDROCHLORIDE 500 MG: 100 INJECTION, SOLUTION INTRAMUSCULAR; INTRAVENOUS at 20:08

## 2024-02-24 RX ADMIN — PANTOPRAZOLE SODIUM 40 MG: 40 TABLET, DELAYED RELEASE ORAL at 09:14

## 2024-02-24 RX ADMIN — PANTOPRAZOLE SODIUM 40 MG: 40 TABLET, DELAYED RELEASE ORAL at 15:42

## 2024-02-24 ASSESSMENT — ACTIVITIES OF DAILY LIVING (ADL)
ADLS_ACUITY_SCORE: 43
ADLS_ACUITY_SCORE: 38
ADLS_ACUITY_SCORE: 36
ADLS_ACUITY_SCORE: 38
ADLS_ACUITY_SCORE: 36
ADLS_ACUITY_SCORE: 43
ADLS_ACUITY_SCORE: 39
ADLS_ACUITY_SCORE: 39
ADLS_ACUITY_SCORE: 38
ADLS_ACUITY_SCORE: 39
ADLS_ACUITY_SCORE: 38
ADLS_ACUITY_SCORE: 39
ADLS_ACUITY_SCORE: 39
ADLS_ACUITY_SCORE: 43
ADLS_ACUITY_SCORE: 38
ADLS_ACUITY_SCORE: 38
ADLS_ACUITY_SCORE: 39
ADLS_ACUITY_SCORE: 36
ADLS_ACUITY_SCORE: 38

## 2024-02-24 NOTE — PROGRESS NOTES
ICU End of Shift Summary. See flowsheets for vital signs and detailed assessment.    Changes this shift: Remains alert, disoriented x1-2. Denies pain. SR-ST . Afebrile. Levo infusing to maintain MAP goal >65. Tolerating RA. Hyperglycemic overnight with BG up to 350. 1x dose novolog given per MICU order. No BM. Voiding spontaneously with adequate UOP. K+ 5.7 this AM, Lokelma given per MICU. Mag replaced per protocol.    Plan: Manage blood glucose. Reorient/delirium precautions. ECHO, possible diagnostic para today.

## 2024-02-24 NOTE — PROGRESS NOTES
Infectious disease triage note:    Received consult for this patient who has recently come off liver transplant list due to septic shock, follows with transplant ID as an outpatient. Here with septic shock and a gram-positive strep bovis bacteremia.  CT abdomen pelvis without clear source.  Patient is steadily improving through the day today and is recently off vasopressors.  Discussed with team that since patient is responding to current antimicrobial regimen would continue vancomycin and cefepime pending further culture data.    Given strep bovis we will need to consider higher risk for seeding, follow repeat cultures (ordered) with full evaluation.    Transplant infectious disease consult note to follow, don't hesitate to reach out in the interim.    Signed:  Alvin Herrera MD, 02/24/2024 Staff Physician, General Infectious Disease  Page Me @ [phone] or Secure Message me via [employer]   For after hours coverage see [employer] ->INFECTIOUS DISEASE MEDICINE ADULT/[facility]

## 2024-02-24 NOTE — PROGRESS NOTES
MEDICAL ICU PROGRESS NOTE  02/24/2024      Date of Service (when I saw the patient): 02/24/2024    ASSESSMENT: Mary Lopez is a 53 year old male with a history of EtOH cirrhosis (sober since 6/25/23) c/b ascites, hepatic encephalopathy and grade I esophageal varices, T2DM, transplant candidate (currently on hold) with recent hospitalization (1/2024) for encephalopathy and suspected SBP (no safe window to complete para) presented to the ED on 2/23 with AMS and encephalopathy, found to be febrile, hypotensive, now with gram pos cocci bacteriemia admitted to MICU for septic shock requiring pressors.     CHANGES and MAJOR THINGS TODAY:   -Hydrocortisone 50mg Q6H  -TTE  -Vit K x1  -Cryofibrinogen for Fibrinogen <100  -Factor 8 assay, teg   -transplant ID consulted        PLAN:    Neuro:  # Pain and sedation  - RASS goal 0 to -1     # Acute on Chronic Metabolic Encephalopathy (HE vs Sepsis)  Pt with ESLD c/b hepatic encephalopathy on lactulose and rifaximin. Ammonia level was 64 (2/23) and asterixis on exam. Per chart review, pt seems to have a history of waxing and waning of mental status, last presented to hospital in 1/2024. Per wife, pt seems to be back on his baseline (02/24).   - cont PTA lactulose and rifaximin as below  - treating septic shock as below     # Alcohol Use Dependence, in remission  Sober since 06/25/2023.      Pulmonary:  No acute issue.     Cardiovascular:  #Septic shock  #Hypotension  Pt presented with AMS, found to be febrile, tachycardiac, and hypotensive requiring pressor. Bcx (2/23) 2/2 bottle came back positive for gram positive cocci, concerning for septic shock. Echo 2/24 with no concern for vegetation.  - Levophed gtt  - MAP goal > 65  - On Cefepime, Vanc as below       GI/Nutrition:  #Decompensated cirrhosis 2/2 Alcohol Cirrhosis  #Alpha-1 antitrypsin MZ heterozygote, C282Y heterozygote  #Ascites  #Concerning for SBP  #Hepatic Encephalopathy  #Grade I Esophageal Varices, no Hx  of variceal bleed  Pt initially presented on 2/15 for possible liver transplant but found to be COVID+ and donor liver transplant was ineligible. Most recent EGD in 2023 found to have G1 EV/GOV Type1 with no hx of bleeding. History of suspected SBP in last admission (2024) but did not have window to complete paracentesis. Pt is on PTA lactulose and rifaximin for HE. Currently on hold for liver transplant, timing for reactivation to be discuss with transplant surgery team.  - GI consulted, appreciate recs         > 25% 25g albumin()         >Discussed with patient and his wife that he is inactive on the transplant list. Reactivating depends on infection clearance and hemodynamic stability. Discussed the importance of nutrition and participating in PT/OT    - PPI BID  - Cont PTA Lactulose (goal of 3-4 BM per day)  - Cont rifaximin 550mg BID  - Cont PTA folate and high dose thiamine  - Transplant ID consulted      MELD 3.0: 43 at 2024  4:00 AM  MELD-Na: 41 at 2024  4:00 AM  Calculated from:  Serum Creatinine: 2.16 mg/dL at 2024  4:00 AM  Serum Sodium: 124 mmol/L (Using min of 125 mmol/L) at 2024  4:00 AM  Total Bilirubin: 11.7 mg/dL at 2024  4:00 AM  Serum Albumin: 2.4 g/dL at 2024  4:00 AM  INR(ratio): 4.89 at 2024 10:29 AM  Age at listin years  Sex: Male at 2024  4:00 AM     #Severe protein malnutrition  #Hypoalbuminemia  - consider Nutrition consultation   - low sodium diet     Renal/Fluids/Electrolytes:  #Pre-renal ANGEL  Cr elevated to 2.5, trending down to 2.16. Baseline-0.6-1.2s. Likely pre renal in setting of septic shock, decreased oral intake, and dehydration. S/p 2L NS in ED and currently on pressor.  - trend BMP  - gave albumin as above      #NAGMA  #Lactic acidosis  #Hyperkalemia, Improving  Lactic acid elevated to 4.5 () improved to 2.6 ()  -Potassium improved with Lokelma    #Pseudohyponatremia  Baseline Na :127-130. Na of 124 corrected to  130 with elevated blood sugar.   - trend BMP daily      Endocrine:  # Type 2 DM  # Concerning for hypoglycemic episode  Pt takes PTA 36 units Tresiba.15 units Humalog with breakfast and lunch, 13 units with dinner. Correction scale 1/35/175/210.   -Tresiba adjusted to 30u for now  -Novolog 10u TID with meals  -HDSSI  -Hypoglycemic protocol      ID:  # Septic Shock  # Streptococcus gallolyticus bacteremia   # Concerning for SBP   Pt presented with 1-day of fatigue and AMS, found to be febrile, tachycardiac, hypotensive, elevated WBC, 2/23 Bcx 2/2 bottle grew Streptococcus gallolyticus (Streptococcus bovis group) concerning for septic shock. MRSA swap negative. UA neg. SBP is suspected with hx of ascites and suspected SBP in last hospitalization though pocket has been too small for paracentesis. Echo 2/24 with no concern for vegetation.   -Cefepime (02/23- *)  -Vancomycin (02/23- *)  -Daily Bcx    Cultures:  - 2/23 Bcx 2/2 bottles: Streptococcus gallolyticus (Streptococcus bovis group)  - 2/23 verigene: no organism detected     Hematology:    #RIJ hematoma  #Hypofibrinogenemia   #Thrombocytopenia vs uremic platelet dysfunction  #Cogulopathy 2/2 ESLD   Hematoma noted at RIJ on 2/24. INR elevated to 4.8, fibrogen <60.Most likely in setting of end stage liver disease. Important to r/o DIC so factor 8 assay is obtained.Teg results consistent hypofibrinogenemia s/p 1 unit cryo on 2/24, plt is in the 100s so more concerning for plt dysfunction in the setting of uremia. Transfused another unit of pRBC for hgb of 7, pt currently hemodynamically stable and no active bleed.   - S/p vitamin K x2, cryo x1  - transfuse if plt <20  -  factor 8 pending  - CTM hematoma        #Macrocytic anemia   Most likely multifactorial in setting of end stage liver disease. Hgb 6.6 on admission. Hgb baselines 7-8s. S/p 2 units pRBC.  - Repeat Hgb  - transfuse if Hgb <7    #Leukocytosis  In setting of infection, as above    Musculoskeletal:  # R  shoulder tear  Pt had a fall in October and now has pain in shoulder. Was told not a candidate for surgery, recently started hydrocodone on 2/19.  - hold PTA hydrocodone   - Voltaren gel      Skin:  No acute issue      General Cares/Prophylaxis:    DVT Prophylaxis: Pneumatic Compression Devices  GI Prophylaxis: PPI  Restraints: none  Family Communication: updated patient and wife at bedside  Code Status: full      Lines/tubes/drains:  - RIJ, PIV     Disposition:  - Medical ICU     Patient seen and findings/plan discussed with medical ICU staff, Dr. Thomas.     Keely Grimaldo MS4  Resident/Fellow Attestation   I, Misha Olivarez MD, was present with the medical/JOSELYN student who participated in the service and in the documentation of the note.  I have verified the history and personally performed the physical exam and medical decision making.  I agree with the assessment and plan of care as documented in the note.          Misha Olivarez MD  PGY3  Date of Service (when I saw the patient): 02/24/24     Clinically Significant Risk Factors Present on Admission        # Hyperkalemia: Highest K = 5.7 mmol/L in last 2 days, will monitor as appropriate  # Hyponatremia: Lowest Na = 123 mmol/L in last 2 days, will monitor as appropriate    # Hypomagnesemia: Lowest Mg = 1.5 mg/dL in last 2 days, will replace as needed   # Hypoalbuminemia: Lowest albumin = 2.4 g/dL at 2/24/2024  4:00 AM, will monitor as appropriate  # Coagulation Defect: INR = 4.89 (Ref range: 0.85 - 1.15) and/or PTT = 66 Seconds (Ref range: 22 - 38 Seconds), will monitor for bleeding  # Thrombocytopenia: Lowest platelets = 95 in last 2 days, will monitor for bleeding  # Acute Kidney Injury, unspecified: based on a >150% or 0.3 mg/dL increase in last creatinine compared to past 90 day average, will monitor renal function  # Hypertension: Noted on problem list   # Circulatory Shock: required vasopressors within past 24 hours      # DMII: A1C = 7.7 % (Ref range: <5.7 %)  "within past 6 months    # Obesity: Estimated body mass index is 35.6 kg/m  as calculated from the following:    Height as of this encounter: 1.727 m (5' 8\").    Weight as of this encounter: 106.2 kg (234 lb 2.1 oz).           # Anemia: based on hgb <11             ====================================  INTERVAL HISTORY:   Pt said he feels better today, denies any fever, chills, nausea, abdominal pain. Did not sleep well, has history of not being able to fall asleep in the past few years, takes While going to use the bathroom, nurse noticed hematoma at RI site, held pressure on hematoma. Discussed plan with pt and wife at bedside.    OBJECTIVE:   1. VITAL SIGNS:   Temp:  [98.1  F (36.7  C)-101.7  F (38.7  C)] 98.1  F (36.7  C)  Pulse:  [] 101  Resp:  [10-28] 20  BP: ()/(36-87) 106/55  SpO2:  [91 %-99 %] 92 %  Resp: 20    2. INTAKE/ OUTPUT:   I/O last 3 completed shifts:  In: 1531.81 [P.O.:1060; I.V.:471.81]  Out: 1500 [Urine:1500]    3. PHYSICAL EXAMINATION:  General: laying in bed, NAD  HEENT: atraumatic, scleral icterus, hematoma at RIJ site   Neuro: alter, oriented to person, place, time, ?situation, overall mentation improved per wife, asterixis bilaterally, no focal deficits  Pulm/Resp: Clear breath sounds bilaterally without rhonchi, crackles or wheeze, breathing non-labored  CV: RRR  Abdomen: Soft, distended, mild tenderness to palpation in lower quadrant (pt needed to urinate)   : deferred  Incisions/Skin: jaundice, spider angioma on chest     4. LABS:   Arterial Blood Gases   No lab results found in last 7 days.  Complete Blood Count   Recent Labs   Lab 02/24/24  0400 02/23/24 2013 02/23/24  0928 02/18/24  1202   WBC 37.1* 42.6* 28.6* 16.4*   HGB 7.5* 7.7* 6.6* 7.2*   * 120* 95* 85*     Basic Metabolic Panel  Recent Labs   Lab 02/24/24  0551 02/24/24  0400 02/24/24  0350 02/24/24  0046 02/23/24  0931 02/23/24  0928 02/18/24  1202   NA  --  124*  --   --   --  123* 123*   POTASSIUM  --  " 5.7*  --   --   --  5.5* 4.3   CHLORIDE  --  94*  --   --   --  91* 90*   CO2  --  21*  --   --   --  21* 24   BUN  --  42.5*  --   --   --  35.5* 22.0*   CR  --  2.16*  --   --   --  2.51* 1.21*   * 355* 342* 298*   < > 141* 286*    < > = values in this interval not displayed.     Liver Function Tests  Recent Labs   Lab 02/24/24  0400 02/23/24  1029 02/23/24  0928   AST 59*  --  56*   ALT 26  --  25   ALKPHOS 207*  --  191*   BILITOTAL 11.7*  --  11.1*   ALBUMIN 2.4*  --  2.4*   INR  --  4.89* 4.18*     Coagulation Profile  Recent Labs   Lab 02/23/24  1029 02/23/24  0928   INR 4.89* 4.18*   PTT  --  66*       5. RADIOLOGY:   Recent Results (from the past 24 hour(s))   XR Chest Port 1 View    Narrative    Exam: XR CHEST PORT 1 VIEW, 2/23/2024 9:41 AM    Comparison: 2/15/2024    History: hypoxia, fever, cough    Findings:  Single AP portable semiupright view of the chest.    Trachea is midline. Stable cardiomediastinal silhouette. Unchanged  elevation of the right hemidiaphragm. Low lung volumes. Mild diffuse  interstitial opacities. Left basilar opacity at the costophrenic  angle. There is no pneumothorax or pleural effusion. Right upper  quadrant surgical clips.      Impression    Impression:   1. Retrocardiac opacities could represent infection/atelectasis.  2. Low lung volumes and findings of pulmonary edema.    I have personally reviewed the examination and initial interpretation  and I agree with the findings.    SHIVANI JUSTIN MD         SYSTEM ID:  [id number]   CT Abdomen Pelvis w/o Contrast    Narrative    EXAMINATION: CT ABDOMEN PELVIS W/O CONTRAST, 2/23/2024 11:20 AM    INDICATION: abd distention and pain, liver cirrhosis, fever,    COMPARISON STUDY: CT 2/15/2024, 2/6/2024    TECHNIQUE: CT scan of the abdomen and pelvis was performed without  contrast on multidetector CT scanner using volumetric acquisition  technique and images were reconstructed in multiple planes with  variable thickness and  reviewed on dedicated workstations.     CT scan radiation dose is optimized to minimum requisite dose using  automated dose modulation techniques.    FINDINGS:    Lower thorax: Trace left pleural effusion with overlying consolidative  opacity containing air bronchograms. Paraesophageal varices.    Abdomen/pelvis:  Liver: Cirrhotic morphology of the liver. No intrahepatic biliary  ductal dilation.    Biliary System: The gallbladder is surgically absent. No extrahepatic  biliary ductal dilation.    Pancreas: No mass or pancreatic ductal dilation.    Adrenal glands: The right adrenal gland is normal. The left adrenal  gland is difficult to characterize given adjacent splenic varices.    Spleen: Enlarged measuring approximately 18 cm in greatest axial  dimension    Kidneys: No obstructing calculus or hydronephrosis.    Gastrointestinal tract : Normal caliber small bowel.    Mesentery/peritoneum/retroperitoneum: No mass. No free air.  Small to  moderate volume ascites. Fat and fluid containing right inguinal  hernia.    Lymph nodes: No significant lymphadenopathy.    Vasculature: Patency of the major intra-abdominal vasculature cannot  be assessed on this noncontrast exam. Normal caliber aorta.  Paraesophageal and splenic varices as noted above.    Pelvis: Urinary bladder is normal.  The prostate is normal in size.    Osseous structures: No aggressive or acute osseous lesion.  Mild  degenerative changes of the spine most significant at L5-S1.      Soft tissues: Bilateral gynecomastia.  Diffuse subcutaneous anasarca.      Impression    IMPRESSION:   1. Cirrhotic morphology of the liver with sequela of portal  hypertension including splenomegaly and perisplenic/esophageal  varices. Small volume ascites.    2. No definite acute pathology is appreciated in the abdomen on this  noncontrast exam.    3. Trace left pleural effusion and overlying consolidative opacity  favored to represent atelectasis.    I have personally  reviewed the examination and initial interpretation  and I agree with the findings.    JULIO JENKINS MD         SYSTEM ID:  [id number]   XR Chest Port 1 View    Narrative    Exam: XR CHEST PORT 1 VIEW, 2/23/2024 12:14 PM    Comparison: 2/23/2024 at 0934 hours    History: line placement    Findings:  Single AP portable semiupright view of the chest.    Trachea is midline. Right IJ central venous catheter with tip  projecting over the right atrium. Stable cardiomediastinal silhouette.  Unchanged elevation of the right hemidiaphragm. Low lung volumes. Mild  diffuse interstitial opacities. Left basilar opacity at the  costophrenic angle. There is no pneumothorax or pleural effusion.  Right upper quadrant surgical clips.      Impression    Impression:   1. Interval placement of right IJ central venous catheter with tip  projecting over the right atrium.  2. Stable retrocardiac opacities which could represent  infection/atelectasis.  3. Similar low lung volumes and findings of pulmonary edema.    I have personally reviewed the examination and initial interpretation  and I agree with the findings.    SHIVANI JUSTIN MD         SYSTEM ID:  [id number]

## 2024-02-24 NOTE — PHARMACY-VANCOMYCIN DOSING SERVICE
Pharmacy Vancomycin Note  Date of Service 2024  Patient's  1970   53 year old, male    Indication: Sepsis  Day of Therapy: 2  Current vancomycin regimen:  intermittent dosing based on levels  Current vancomycin monitoring method: Trough (Method 2 = manual dose calculation)  Current vancomycin therapeutic monitoring goal: 15-20 mg/L    Current estimated CrCl = Estimated Creatinine Clearance: 47.2 mL/min (A) (based on SCr of 2.16 mg/dL (H)).    Creatinine for last 3 days  2024:  9:28 AM Creatinine 2.51 mg/dL  2024:  4:00 AM Creatinine 2.16 mg/dL    Recent Vancomycin Levels (past 3 days)  2024: 10:13 AM Vancomycin 9.7 ug/mL    Vancomycin IV Administrations (past 72 hours)                     vancomycin (VANCOCIN) 2,000 mg in sodium chloride 0.9 % 500 mL intermittent infusion (mg) 2,000 mg New Bag 24 1031                    Nephrotoxins and other renal medications (From now, onward)      Start     Dose/Rate Route Frequency Ordered Stop    24 1530  vancomycin (VANCOCIN) 1,250 mg in 0.9% NaCl 250 mL intermittent infusion         1,250 mg  over 90 Minutes Intravenous EVERY 24 HOURS 24 1512      24 1150  norepinephrine (LEVOPHED) 16 mg in  mL infusion MAX CONC CENTRAL LINE         0.01-0.6 mcg/kg/min × 95.3 kg  0.9-53.6 mL/hr  Intravenous CONTINUOUS 24 1146      24 0932  vancomycin place fierro - receiving intermittent dosing         1 each Intravenous SEE ADMIN INSTRUCTIONS 24 0932                 Contrast Orders - past 72 hours (72h ago, onward)      None            Interpretation of levels and current regimen:  Vancomycin level is reflective of subtherapeutic level    Has serum creatinine changed greater than 50% in last 72 hours: unknown    Urine output:  good urine output    Renal Function: Appears Stable but SCr still elevated from baseline    Change to AUC dosing at this time:  Regimen: 1250 mg IV every 24 hours.  Start time: 10:  on 02/24/2024  Exposure target: AUC24 (range)400-600 mg/L.hr   AUC24,ss: 464 mg/L.hr  Probability of AUC24 > 400: 77 %  Ctrough,ss: 14.9 mg/L  Probability of Ctrough,ss > 20: 15 %  Probability of nephrotoxicity (Lodise SCOTT 2009): 10 %      Plan:  Start vancomycin 1250 mg IV q24h  Vancomycin monitoring method: AUC  Vancomycin therapeutic monitoring goal: 400-600 mg*h/L  Pharmacy will check vancomycin levels as appropriate in 1-3 Days.  Serum creatinine levels will be ordered daily for the first week of therapy and at least twice weekly for subsequent weeks.    Wili Betancourt, PharmD, BCCCP         .

## 2024-02-24 NOTE — PROGRESS NOTES
ICU Daily Rounding Checklist     Checklist Response Notes   Can sedation be reduced?  NA    Can analgesia be reduced? NA    Is delirium being assessed, addressed and prevented? Yes    Spontaneous awakening trial and/or Spontaneous breathing trial candidate?  NA    Total fluid balance goal reviewed?  Yes Target Goals:        net positive 0-1L   Is the patient at goals for lung protective ventilation? NA    Head of bed elevation (30 degrees)? Yes    Skin breakdown assessment (prevention) completed? Yes    Is enteral nutrition at goal? Yes    Is blood glucose at goal? No    Deep venous thrombosis prophylaxis? No Coagulopathic with internal jugular hematoma     Gastric ulcer prophylaxis?  Yes If coagulopathy (INR-1.5 PTT2x normal. Ph<50k), mechanical ventilation 48hr, history of GI bleed/ulcer within past year. TBL, SCI, or burn, or if >= 2 minor risk factors (sepsis, ICU stay 1 week, occult GI bleed > 6 days. glucocorticoid therapy, NSAID use, antiplatelet use)   Can Antibiotics be narrowed or discontinued? No    Early mobility candidate and physical therapy consulted? Yes    Is paris catheter needed? NA    Is central venous/arterial catheter needed? Yes    Has the family been updated? Yes    Are the patient's goals of care and code status current? Yes

## 2024-02-24 NOTE — PROVIDER NOTIFICATION
Patient finished using commode, went to assist patient into bed, noticed R neck hematoma where internal jugular is, notified MICU team, assessed. Vitally stable with unchanged dose of Levo need, pressure applied for 30 mins, pressure dressing applied, labs drawn.    Vit K dose given. Cryo to be given when comes up from blood bank. Ongoing lab checks and assessment of site.

## 2024-02-24 NOTE — PROGRESS NOTES
"[facility]    Hepatology Follow-up    CC: Decompensated cirrhosis    Dx: ANGEL, leukocytosis/fever, AMS    24 hour events:   WBC rising then  downtrending  NE improving  Creatinine stable after IVF yesterday and Hgb transfusion  2/2 bottles Blood Cx + GPCs, TTE without vegetations    Subjective:  Still a little confused, but able to have a basic conversation  Developed hematoma around internal jugular today, getting produce. No GI blood loss.   Chronic right shoulder pain bothering him    Medications  Current Facility-Administered Medications   Medication Dose Route Frequency    ceFEPIme  2 g Intravenous Q12H    folic acid  1 mg Oral Daily    insulin aspart  10 Units Subcutaneous TID AC    insulin aspart  1-10 Units Subcutaneous TID AC    insulin aspart  1-7 Units Subcutaneous At Bedtime    insulin degludec  30 Units Subcutaneous Daily    lactulose  20 g Oral TID    rifaximin  550 mg Oral BID    sodium zirconium cyclosilicate  10 g Oral TID    thiamine  500 mg Intravenous TID    Followed by    [START ON 2/26/2024] thiamine  250 mg Intravenous Daily    Followed by    [START ON 3/2/2024] thiamine  100 mg Oral Daily    vancomycin place fierro - receiving intermittent dosing  1 each Intravenous See Admin Instructions       Review of systems  A 10-point review of systems was negative.    Examination  /55   Pulse 101   Temp 98.1  F (36.7  C) (Oral)   Resp 20   Ht 1.727 m (5' 8\")   Wt 106.2 kg (234 lb 2.1 oz)   SpO2 92%   BMI 35.60 kg/m      Intake/Output Summary (Last 24 hours) at 2/24/2024 0832  Last data filed at 2/24/2024 0700  Gross per 24 hour   Intake 1551.11 ml   Output 1500 ml   Net 51.11 ml       Gen- well, NAD  ENT- Conjunctiva icteric, pressure dressing on right neck  CVS- RRR  RS- CTA  Abd-soft, non-tender,  distended.   Extr-no Gabrielle edema  Skin- no rash, jaundice  Psych- normal mood  Neuro-no asterixis. A&Ox3    Laboratory  Lab Results   Component Value Date     " 02/24/2024     07/05/2020    POTASSIUM 5.7 02/24/2024    POTASSIUM 3.8 03/12/2022    POTASSIUM 4.2 07/05/2020    CHLORIDE 94 02/24/2024    CHLORIDE 101 03/12/2022    CHLORIDE 102 07/05/2020    CO2 21 02/24/2024    CO2 25 03/12/2022    CO2 28 07/05/2020    BUN 42.5 02/24/2024    BUN 11 03/12/2022    BUN 13 07/05/2020    CR 2.16 02/24/2024    CR 0.95 07/05/2020     Lab Results   Component Value Date    BILITOTAL 11.7 02/24/2024    BILITOTAL 0.8 12/19/2006    ALT 26 02/24/2024    ALT 50 12/19/2006    AST 59 02/24/2024    AST 52 12/19/2006    ALKPHOS 207 02/24/2024    ALKPHOS 117 12/19/2006     Lab Results   Component Value Date    WBC 37.1 02/24/2024    WBC 10.8 12/19/2006    HGB 7.5 02/24/2024    HGB 16.9 12/19/2006     02/24/2024    MCV 85 12/19/2006     02/24/2024     12/19/2006     Lab Results   Component Value Date    INR 4.89 02/23/2024       Radiology  No new imaging in the last 24 hours    Endoscopy  No new procedures in the last 24 hours    Assessment  53 year old  with a history of alcohol (sober since 6/2023) and MASLD (metALD) cirrhosis with contributing factors of A1AT (MZ) and 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 He was noted to have a fever, hypothermia and worsening liver tests.     Now on NE being treated for sepsis of unclear source, potentially bacteremia. Also had ANGEL    Recommendations  - Please consult transplant ID, continue abx per primary team. Follow up TTE and cultures. Get repeat blood cultures today  - Albumin challenge for ANGEL, please give 25 grams 25% albumin today. NE For goal MAP > 65 for ANGEL and hemodynamic parameters  - Continue lactulose and rifaximin  - Discussed with patient and his wife that he is inactive on the transplant list. Reactivating depends on infection clearance and hemodynamic stability.  Discussed the importance of nutrition and participating in PT/OT     Dr. Liana Roldan MD  Transplant Hepatology

## 2024-02-25 LAB
ALBUMIN SERPL BCG-MCNC: 2.5 G/DL (ref 3.5–5.2)
ALP SERPL-CCNC: 170 U/L (ref 40–150)
ALT SERPL W P-5'-P-CCNC: 23 U/L (ref 0–70)
ANION GAP SERPL CALCULATED.3IONS-SCNC: 9 MMOL/L (ref 7–15)
AST SERPL W P-5'-P-CCNC: 63 U/L (ref 0–45)
BACTERIA BLD CULT: ABNORMAL
BACTERIA BLD CULT: ABNORMAL
BILIRUB SERPL-MCNC: 9.3 MG/DL
BUN SERPL-MCNC: 43.2 MG/DL (ref 6–20)
CALCIUM SERPL-MCNC: 8.2 MG/DL (ref 8.6–10)
CHLORIDE SERPL-SCNC: 97 MMOL/L (ref 98–107)
CREAT SERPL-MCNC: 1.64 MG/DL (ref 0.67–1.17)
DEPRECATED HCO3 PLAS-SCNC: 21 MMOL/L (ref 22–29)
EGFRCR SERPLBLD CKD-EPI 2021: 50 ML/MIN/1.73M2
ERYTHROCYTE [DISTWIDTH] IN BLOOD BY AUTOMATED COUNT: 16.2 % (ref 10–15)
FIBRINOGEN PPP-MCNC: 106 MG/DL (ref 170–490)
GLUCOSE BLDC GLUCOMTR-MCNC: 108 MG/DL (ref 70–99)
GLUCOSE BLDC GLUCOMTR-MCNC: 109 MG/DL (ref 70–99)
GLUCOSE BLDC GLUCOMTR-MCNC: 109 MG/DL (ref 70–99)
GLUCOSE BLDC GLUCOMTR-MCNC: 124 MG/DL (ref 70–99)
GLUCOSE BLDC GLUCOMTR-MCNC: 127 MG/DL (ref 70–99)
GLUCOSE BLDC GLUCOMTR-MCNC: 133 MG/DL (ref 70–99)
GLUCOSE BLDC GLUCOMTR-MCNC: 159 MG/DL (ref 70–99)
GLUCOSE BLDC GLUCOMTR-MCNC: 199 MG/DL (ref 70–99)
GLUCOSE BLDC GLUCOMTR-MCNC: 276 MG/DL (ref 70–99)
GLUCOSE BLDC GLUCOMTR-MCNC: 283 MG/DL (ref 70–99)
GLUCOSE BLDC GLUCOMTR-MCNC: 305 MG/DL (ref 70–99)
GLUCOSE BLDC GLUCOMTR-MCNC: 318 MG/DL (ref 70–99)
GLUCOSE BLDC GLUCOMTR-MCNC: 331 MG/DL (ref 70–99)
GLUCOSE BLDC GLUCOMTR-MCNC: 332 MG/DL (ref 70–99)
GLUCOSE BLDC GLUCOMTR-MCNC: 363 MG/DL (ref 70–99)
GLUCOSE BLDC GLUCOMTR-MCNC: 364 MG/DL (ref 70–99)
GLUCOSE SERPL-MCNC: 334 MG/DL (ref 70–99)
HBA1C MFR BLD: 4.8 %
HCT VFR BLD AUTO: 22.9 % (ref 40–53)
HGB BLD-MCNC: 7.9 G/DL (ref 13.3–17.7)
INR PPP: 2.78 (ref 0.85–1.15)
MAGNESIUM SERPL-MCNC: 1.9 MG/DL (ref 1.7–2.3)
MCH RBC QN AUTO: 32.6 PG (ref 26.5–33)
MCHC RBC AUTO-ENTMCNC: 34.5 G/DL (ref 31.5–36.5)
MCV RBC AUTO: 95 FL (ref 78–100)
PHOSPHATE SERPL-MCNC: 4.4 MG/DL (ref 2.5–4.5)
PLATELET # BLD AUTO: 66 10E3/UL (ref 150–450)
POTASSIUM SERPL-SCNC: 4.5 MMOL/L (ref 3.4–5.3)
PROT SERPL-MCNC: 6 G/DL (ref 6.4–8.3)
RBC # BLD AUTO: 2.42 10E6/UL (ref 4.4–5.9)
SODIUM SERPL-SCNC: 127 MMOL/L (ref 135–145)
WBC # BLD AUTO: 20.1 10E3/UL (ref 4–11)

## 2024-02-25 PROCEDURE — 250N000011 HC RX IP 250 OP 636: Performed by: EMERGENCY MEDICINE

## 2024-02-25 PROCEDURE — 250N000011 HC RX IP 250 OP 636: Mod: JZ

## 2024-02-25 PROCEDURE — 250N000009 HC RX 250

## 2024-02-25 PROCEDURE — 84100 ASSAY OF PHOSPHORUS: CPT | Performed by: STUDENT IN AN ORGANIZED HEALTH CARE EDUCATION/TRAINING PROGRAM

## 2024-02-25 PROCEDURE — 99255 IP/OBS CONSLTJ NEW/EST HI 80: CPT | Mod: GC | Performed by: INTERNAL MEDICINE

## 2024-02-25 PROCEDURE — 85027 COMPLETE CBC AUTOMATED: CPT

## 2024-02-25 PROCEDURE — 250N000011 HC RX IP 250 OP 636

## 2024-02-25 PROCEDURE — 85384 FIBRINOGEN ACTIVITY: CPT

## 2024-02-25 PROCEDURE — 83735 ASSAY OF MAGNESIUM: CPT | Performed by: STUDENT IN AN ORGANIZED HEALTH CARE EDUCATION/TRAINING PROGRAM

## 2024-02-25 PROCEDURE — 85610 PROTHROMBIN TIME: CPT

## 2024-02-25 PROCEDURE — 99232 SBSQ HOSP IP/OBS MODERATE 35: CPT | Performed by: STUDENT IN AN ORGANIZED HEALTH CARE EDUCATION/TRAINING PROGRAM

## 2024-02-25 PROCEDURE — 250N000013 HC RX MED GY IP 250 OP 250 PS 637

## 2024-02-25 PROCEDURE — 83036 HEMOGLOBIN GLYCOSYLATED A1C: CPT

## 2024-02-25 PROCEDURE — 250N000013 HC RX MED GY IP 250 OP 250 PS 637: Performed by: STUDENT IN AN ORGANIZED HEALTH CARE EDUCATION/TRAINING PROGRAM

## 2024-02-25 PROCEDURE — 200N000002 HC R&B ICU UMMC

## 2024-02-25 PROCEDURE — 99291 CRITICAL CARE FIRST HOUR: CPT | Mod: GC | Performed by: INTERNAL MEDICINE

## 2024-02-25 PROCEDURE — 36415 COLL VENOUS BLD VENIPUNCTURE: CPT

## 2024-02-25 PROCEDURE — 82040 ASSAY OF SERUM ALBUMIN: CPT

## 2024-02-25 PROCEDURE — 87040 BLOOD CULTURE FOR BACTERIA: CPT

## 2024-02-25 RX ORDER — CEFTRIAXONE 2 G/1
2 INJECTION, POWDER, FOR SOLUTION INTRAMUSCULAR; INTRAVENOUS EVERY 24 HOURS
Status: DISCONTINUED | OUTPATIENT
Start: 2024-02-25 | End: 2024-03-01

## 2024-02-25 RX ORDER — DEXTROSE MONOHYDRATE 100 MG/ML
INJECTION, SOLUTION INTRAVENOUS CONTINUOUS PRN
Status: DISCONTINUED | OUTPATIENT
Start: 2024-02-25 | End: 2024-03-02 | Stop reason: HOSPADM

## 2024-02-25 RX ORDER — NICOTINE POLACRILEX 4 MG
15-30 LOZENGE BUCCAL
Status: DISCONTINUED | OUTPATIENT
Start: 2024-02-25 | End: 2024-03-02 | Stop reason: HOSPADM

## 2024-02-25 RX ORDER — MAGNESIUM SULFATE 1 G/100ML
1 INJECTION INTRAVENOUS ONCE
Qty: 100 ML | Refills: 0 | Status: COMPLETED | OUTPATIENT
Start: 2024-02-25 | End: 2024-02-25

## 2024-02-25 RX ORDER — MIDODRINE HYDROCHLORIDE 5 MG/1
10 TABLET ORAL DAILY
Status: DISCONTINUED | OUTPATIENT
Start: 2024-02-25 | End: 2024-02-25

## 2024-02-25 RX ORDER — MIDODRINE HYDROCHLORIDE 5 MG/1
10 TABLET ORAL EVERY 8 HOURS
Status: DISCONTINUED | OUTPATIENT
Start: 2024-02-26 | End: 2024-03-02 | Stop reason: HOSPADM

## 2024-02-25 RX ORDER — MIDODRINE HYDROCHLORIDE 5 MG/1
10 TABLET ORAL 2 TIMES DAILY
Status: DISCONTINUED | OUTPATIENT
Start: 2024-02-25 | End: 2024-02-25

## 2024-02-25 RX ORDER — DEXTROSE MONOHYDRATE 25 G/50ML
25-50 INJECTION, SOLUTION INTRAVENOUS
Status: DISCONTINUED | OUTPATIENT
Start: 2024-02-25 | End: 2024-03-02 | Stop reason: HOSPADM

## 2024-02-25 RX ADMIN — CEFEPIME HYDROCHLORIDE 2 G: 2 INJECTION, POWDER, FOR SOLUTION INTRAVENOUS at 09:49

## 2024-02-25 RX ADMIN — RIFAXIMIN 550 MG: 550 TABLET ORAL at 19:37

## 2024-02-25 RX ADMIN — HYDROCORTISONE SODIUM SUCCINATE 50 MG: 100 INJECTION, POWDER, FOR SOLUTION INTRAMUSCULAR; INTRAVENOUS at 04:40

## 2024-02-25 RX ADMIN — THIAMINE HYDROCHLORIDE 500 MG: 100 INJECTION, SOLUTION INTRAMUSCULAR; INTRAVENOUS at 07:44

## 2024-02-25 RX ADMIN — PANTOPRAZOLE SODIUM 40 MG: 40 TABLET, DELAYED RELEASE ORAL at 07:44

## 2024-02-25 RX ADMIN — HYDROCORTISONE SODIUM SUCCINATE 50 MG: 100 INJECTION, POWDER, FOR SOLUTION INTRAMUSCULAR; INTRAVENOUS at 15:30

## 2024-02-25 RX ADMIN — PANTOPRAZOLE SODIUM 40 MG: 40 TABLET, DELAYED RELEASE ORAL at 15:30

## 2024-02-25 RX ADMIN — FOLIC ACID 1 MG: 1 TABLET ORAL at 07:44

## 2024-02-25 RX ADMIN — MIDODRINE HYDROCHLORIDE 10 MG: 5 TABLET ORAL at 11:41

## 2024-02-25 RX ADMIN — PHYTONADIONE 5 MG: 10 INJECTION, EMULSION INTRAMUSCULAR; INTRAVENOUS; SUBCUTANEOUS at 08:48

## 2024-02-25 RX ADMIN — LACTULOSE 20 G: 20 SOLUTION ORAL at 07:44

## 2024-02-25 RX ADMIN — INSULIN DEGLUDEC 30 UNITS: 100 INJECTION, SOLUTION SUBCUTANEOUS at 07:44

## 2024-02-25 RX ADMIN — CEFTRIAXONE SODIUM 2 G: 2 INJECTION, POWDER, FOR SOLUTION INTRAMUSCULAR; INTRAVENOUS at 15:30

## 2024-02-25 RX ADMIN — HYDROCORTISONE SODIUM SUCCINATE 50 MG: 100 INJECTION, POWDER, FOR SOLUTION INTRAMUSCULAR; INTRAVENOUS at 21:45

## 2024-02-25 RX ADMIN — HYDROCORTISONE SODIUM SUCCINATE 50 MG: 100 INJECTION, POWDER, FOR SOLUTION INTRAMUSCULAR; INTRAVENOUS at 09:49

## 2024-02-25 RX ADMIN — LACTULOSE 20 G: 20 SOLUTION ORAL at 19:37

## 2024-02-25 RX ADMIN — LACTULOSE 20 G: 20 SOLUTION ORAL at 13:34

## 2024-02-25 RX ADMIN — THIAMINE HYDROCHLORIDE 500 MG: 100 INJECTION, SOLUTION INTRAMUSCULAR; INTRAVENOUS at 13:34

## 2024-02-25 RX ADMIN — INSULIN HUMAN 4.5 UNITS/HR: 1 INJECTION, SOLUTION INTRAVENOUS at 11:24

## 2024-02-25 RX ADMIN — RIFAXIMIN 550 MG: 550 TABLET ORAL at 07:44

## 2024-02-25 RX ADMIN — MIDODRINE HYDROCHLORIDE 10 MG: 5 TABLET ORAL at 19:37

## 2024-02-25 RX ADMIN — INSULIN HUMAN 6 UNITS/HR: 1 INJECTION, SOLUTION INTRAVENOUS at 17:36

## 2024-02-25 RX ADMIN — INSULIN ASPART 8 UNITS: 100 INJECTION, SOLUTION INTRAVENOUS; SUBCUTANEOUS at 07:44

## 2024-02-25 RX ADMIN — MAGNESIUM SULFATE HEPTAHYDRATE 1 G: 1 INJECTION, SOLUTION INTRAVENOUS at 11:26

## 2024-02-25 ASSESSMENT — ACTIVITIES OF DAILY LIVING (ADL)
ADLS_ACUITY_SCORE: 36
ADLS_ACUITY_SCORE: 43
ADLS_ACUITY_SCORE: 43
ADLS_ACUITY_SCORE: 36
ADLS_ACUITY_SCORE: 43
ADLS_ACUITY_SCORE: 43
ADLS_ACUITY_SCORE: 36
ADLS_ACUITY_SCORE: 43
ADLS_ACUITY_SCORE: 36
ADLS_ACUITY_SCORE: 43
ADLS_ACUITY_SCORE: 35
ADLS_ACUITY_SCORE: 36
DEPENDENT_IADLS:: COOKING;CLEANING;LAUNDRY;SHOPPING;TRANSPORTATION
ADLS_ACUITY_SCORE: 43

## 2024-02-25 NOTE — CONSULTS
Care Management Initial Consult    General Information  Assessment completed with: Patient, Spouse or significant other, Rosio Hernandez  Type of CM/SW Visit: Initial Assessment    Primary Care Provider verified and updated as needed: Yes   Readmission within the last 30 days: no previous admission in last 30 days   Reason for Consult: other (see comments) (elevated readmission risk score)  Advance Care Planning: Advance Care Planning Reviewed: present on chart        Communication Assessment  Patient's communication style: spoken language (English or Bilingual)    Hearing Difficulty or Deaf: no   Wear Glasses or Blind: no    Cognitive  Cognitive/Neuro/Behavioral: .WDL except (forgetful)  Level of Consciousness: alert  Arousal Level: opens eyes spontaneously  Orientation: disoriented to, time  Mood/Behavior: calm, cooperative  Best Language: 1 - Mild to moderate  Speech: clear, spontaneous, logical    Living Environment:   People in home: spouse  Rosio  Current living Arrangements: other (see comments) ([facility])      Able to return to prior arrangements: yes     Family/Social Support:  Care provided by: spouse/significant other  Provides care for: no one  Marital Status:   Wife  Rosio       Description of Support System: Involved, Supportive    Support Assessment: Adequate family and caregiver support    Current Resources:   Patient receiving home care services: No     Community Resources: None  Equipment currently used at home: none  Supplies currently used at home: Diabetic Supplies    Employment/Financial:  Employment Status: other (see comments) (currently on long term disability)        Financial Concerns:     Referral to Financial Worker: No     Does the patient's insurance plan have a 3 day qualifying hospital stay waiver?  Yes     Which insurance plan 3 day waiver is available? Alternative insurance waiver    Will the waiver be used for post-acute placement? Undetermined at this  time    Lifestyle & Psychosocial Needs:  Social Determinants of Health     Food Insecurity: Low Risk  (12/22/2023)    Food Insecurity     Within the past 12 months, did you worry that your food would run out before you got money to buy more?: No     Within the past 12 months, did the food you bought just not last and you didn t have money to get more?: No   Depression: Not at risk (2/6/2024)    PHQ-2     PHQ-2 Score: 0   Recent Concern: Depression - At risk (11/9/2023)    PHQ-2     PHQ-2 Score: 3   Housing Stability: Low Risk  (12/22/2023)    Housing Stability     Do you have housing? : Yes     Are you worried about losing your housing?: No   Tobacco Use: High Risk (2/14/2024)    Patient History     Smoking Tobacco Use: Former     Smokeless Tobacco Use: Current     Passive Exposure: Never   Financial Resource Strain: Low Risk  (12/22/2023)    Financial Resource Strain     Within the past 12 months, have you or your family members you live with been unable to get utilities (heat, electricity) when it was really needed?: No   Alcohol Use: Not on file   Transportation Needs: Low Risk  (12/22/2023)    Transportation Needs     Within the past 12 months, has lack of transportation kept you from medical appointments, getting your medicines, non-medical meetings or appointments, work, or from getting things that you need?: No   Physical Activity: Not on file   Interpersonal Safety: Low Risk  (12/22/2023)    Interpersonal Safety     Do you feel physically and emotionally safe where you currently live?: Yes     Within the past 12 months, have you been hit, slapped, kicked or otherwise physically hurt by someone?: No     Within the past 12 months, have you been humiliated or emotionally abused in other ways by your partner or ex-partner?: No   Stress: Not on file   Social Connections: Not on file     Functional Status:  Prior to admission patient needed assistance:   Dependent ADLs:: Independent  Dependent IADLs:: Cooking,  Cleaning, Laundry, Shopping, Transportation     Mental Health Status:  Mental Health Status: No Current Concerns       Chemical Dependency Status:  Chemical Dependency Status: Past Concern (per notes sober since 6/25/23)           Values/Beliefs:  Spiritual, Cultural Beliefs, Christianity Practices, Values that affect care: no             Additional Information:  Met with patient and patients wife to complete initial care management assessment in response to elevated readmission risk score. Patient currently lives in a [facility] in [place] with his wife Rosio. He denies assistive devices at baseline and states he is independent with ADLs. Wife assists with IADLs including transportation. He has diabetic supplies at home but denies other medical equipment or supplies. He denies current home or community services. He is on long term disability since July from his job as a . Wife is able to support patient at discharge.     Discharge needs unknown at this time, CM will continue to follow and assist with discharge planning as indicated.     Modesta Mathew RN, BSN  6A RN Care Coordinator  Ph: [phone]   Pager: [phone]

## 2024-02-25 NOTE — PROGRESS NOTES
ICU End of Shift Summary. See flowsheets for vital signs and detailed assessment.    Changes this shift: Neurologically unchanged. Remains disoriented x1, intermittently impulsive and refusing nursing instruction. Bed alarm on. SR-ST . Titrating levo to maintain MAP goal >65. 1u RBC, 1u cryo, and 1u plt transfused. See results review for re-check values. Tolerating RA. BG remains 200-300. MICU aware. Voiding spontaneously with adequate UOP. Large BM x1.     Plan: wean pressors as appropriate. Monitor BG. Reorient/delirium precautions.

## 2024-02-25 NOTE — PLAN OF CARE
ICU End of Shift Summary. See flowsheets for vital signs and detailed assessment.    Changes this shift: Mentation unchanged, disoriented to time, forgetful, disorganized conversations. PO Midodrine started, Levo weaned off at 1530, MAP goal >65. Edema BLE +3, worsen today compared to yesterday. Mag 1.9, replaced. -300's, insulin drip started, hourly BG checks, Educated patient and wife about diabetes and diet, diet updated to carb 60g and 2g Na. INR slight improved, Vit K PO daily. Assist x1.     Plan: Monitor BP. Monitor BG. Encourage activity. Continue with POC.     Goal Outcome Evaluation:      Plan of Care Reviewed With: patient    Overall Patient Progress: no changeOverall Patient Progress: no change    Outcome Evaluation: Wean off pressors as tolerated, MAP goal >65. R neck hematoma i,proving, labs stabilizing with blood products.

## 2024-02-25 NOTE — PLAN OF CARE
ICU End of Shift Summary. See flowsheets for vital signs and detailed assessment.    Changes this shift: Unchanged mentation, bed alarm on, calm and cooperative. Reports R neck tenderness, hematoma discovered at 0800 assessment, MICU notified, pressor applied, Cryo given, Vit K given, coag recheck at 1800. Hgb 7, 1 unit of PRBC given. Echo completed. MAP goal >65, Levo at 0.02. BCx collected, daily. -300's, patient and family needs education on diet, not receptive to RN education at bedside. Up to bedside commode with minimal assist x1. Continue with POC.     Plan: Wean pressor as tolerated. Monitor BG. Continue with POC.     Goal Outcome Evaluation:      Plan of Care Reviewed With: patient    Overall Patient Progress: no changeOverall Patient Progress: no change    Outcome Evaluation: Wean pressors, MAP goal >65. Infection workup. R neck hematoma, products given.

## 2024-02-25 NOTE — PROGRESS NOTES
"[facility]    Hepatology Follow-up    CC: Decompensated cirrhosis    Dx: ANGEL, leukocytosis/fever, AMS    24 hour events:   Pressor requirement improving  WBC downtrending  No new positive cultures  Patient getting up to chair     Medications  Current Facility-Administered Medications   Medication Dose Route Frequency     ceFEPIme  2 g Intravenous Q12H     folic acid  1 mg Oral Daily     hydrocortisone sodium succinate PF  50 mg Intravenous Q6H     insulin aspart  10 Units Subcutaneous TID AC     insulin aspart  1-10 Units Subcutaneous TID AC     insulin aspart  1-7 Units Subcutaneous At Bedtime     insulin degludec  30 Units Subcutaneous Daily     lactulose  20 g Oral TID     pantoprazole  40 mg Oral BID AC     phytonadione  5 mg Oral Daily     rifaximin  550 mg Oral BID     thiamine  500 mg Intravenous TID    Followed by     [START ON 2/26/2024] thiamine  250 mg Intravenous Daily    Followed by     [START ON 3/2/2024] thiamine  100 mg Oral Daily     vancomycin  1,250 mg Intravenous Q24H       Review of systems  A 10-point review of systems was negative.    Examination  /53   Pulse 92   Temp 97.7  F (36.5  C) (Oral)   Resp 16   Ht 1.727 m (5' 8\")   Wt 106.8 kg (235 lb 7.2 oz)   SpO2 97%   BMI 35.80 kg/m      Intake/Output Summary (Last 24 hours) at 2/24/2024 0832  Last data filed at 2/24/2024 0700  Gross per 24 hour   Intake 1551.11 ml   Output 1500 ml   Net 51.11 ml       Gen- well, NAD  ENT- Conjunctiva icteric, pressure dressing on right neck  CVS- RRR  RS- CTA  Abd-soft, non-tender,  distended.   Extr-no Gabrielle edema  Skin- no rash, jaundice  Psych- normal mood  Neuro-no asterixis. A&Ox3    Laboratory  Lab Results   Component Value Date     02/24/2024     07/05/2020    POTASSIUM 5.7 02/24/2024    POTASSIUM 3.8 03/12/2022    POTASSIUM 4.2 07/05/2020    CHLORIDE 94 02/24/2024    CHLORIDE 101 03/12/2022    CHLORIDE 102 07/05/2020    CO2 21 02/24/2024    CO2 25 " 03/12/2022    CO2 28 07/05/2020    BUN 42.5 02/24/2024    BUN 11 03/12/2022    BUN 13 07/05/2020    CR 2.16 02/24/2024    CR 0.95 07/05/2020     Lab Results   Component Value Date    BILITOTAL 11.7 02/24/2024    BILITOTAL 0.8 12/19/2006    ALT 26 02/24/2024    ALT 50 12/19/2006    AST 59 02/24/2024    AST 52 12/19/2006    ALKPHOS 207 02/24/2024    ALKPHOS 117 12/19/2006     Lab Results   Component Value Date    WBC 37.1 02/24/2024    WBC 10.8 12/19/2006    HGB 7.5 02/24/2024    HGB 16.9 12/19/2006     02/24/2024    MCV 85 12/19/2006     02/24/2024     12/19/2006     Lab Results   Component Value Date    INR 4.89 02/23/2024       Radiology  No new imaging in the last 24 hours    Endoscopy  No new procedures in the last 24 hours    Assessment  53 year old  with a history of alcohol (sober since 6/2023) and MASLD (metALD) cirrhosis with contributing factors of A1AT (MZ) and 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 He was noted to have a fever, hypothermia and worsening liver tests.     Now on NE being treated for sepsis of unclear source, potentially bacteremia. Also had ANGEL    Recommendations  - Albumin challenge for ANGEL, please give 25 grams 25% albumin today. NE For goal MAP > 65 for ANGEL and hemodynamic parameters. Agree with midodrine to help with renal perfusion and weaning off pressors  - Follow up transplant ID rec, continue abx per primary team.  - Continue lactulose and rifaximin  - Discussed with patient and his wife that he is inactive on the transplant list. Reactivating depends on infection clearance and hemodynamic stability. Discussed the importance of nutrition and participating in PT/OT     Dr. Liana Roldan MD  Transplant Hepatology

## 2024-02-25 NOTE — PROGRESS NOTES
MEDICAL ICU PROGRESS NOTE  02/25/2024      Date of Service (when I saw the patient): 02/25/2024    ASSESSMENT: Mary Lopez is a 53 year old male with a history of EtOH cirrhosis (sober since 6/25/23) c/b ascites, hepatic encephalopathy and grade I esophageal varices, T2DM, transplant candidate (currently inactive on transplant list) with recent hospitalization (1/2024) for encephalopathy and suspected SBP (no safe window to complete para) presented to the ED on 2/23 febrile,hypotensive with AMS and encephalopathy, now with gram pos cocci bacteriemia admitted to MICU for septic shock requiring pressors.     CHANGES and MAJOR THINGS TODAY:   -Start Midodrine 10mg dly  -Transition to Insulin gtt  - Narrow antibiotics to Ceftriaxone 2g Q24H    PLAN:    Neuro:  # Pain and sedation  - RASS goal 0 to -1     # Acute on Chronic Metabolic Encephalopathy (HE vs Sepsis)  Pt with ESLD c/b hepatic encephalopathy on lactulose and rifaximin. Ammonia level was 64 (2/23) and asterixis on exam. Per chart review, pt seems to have a history of waxing and waning of mental status, last presented to hospital in 1/2024. Per wife, pt seems to be back on his baseline (02/24).   - cont PTA lactulose and rifaximin as below  - treating septic shock as below     # Alcohol Use Dependence, in remission  Sober since 06/25/2023.      Pulmonary:  No acute issue.     Cardiovascular:  #Septic shock  #Hypotension  Pt presented with AMS, found to be febrile, tachycardiac, and hypotensive requiring minimal pressor. Bcx (2/23) 2/2 bottle came back positive for gram positive cocci, concerning for septic shock. Echo 2/24 with no concern for vegetation.  - Wean off Levophed gtt as able  - MAP goal > 65  - Start Midodrine 10mg dly  - Narrow ABX to Ceftriaxone 2g Q24H  - Discontinue Cefepime, Vanc   - Hydrocortisone 50mg Q6H     GI/Nutrition:  #Decompensated cirrhosis 2/2 Alcohol Cirrhosis  #Alpha-1 antitrypsin MZ heterozygote, C282Y  heterozygote  #Hepatic Encephalopathy  #Grade I Esophageal Varices, no Hx of variceal bleed  Pt initially presented on 2/15 for possible liver transplant but found to be COVID+ and donor liver transplant was ineligible. Most recent EGD in 2023 found to have G1 EV/GOV Type1 with no hx of bleeding. History of suspected SBP in last admission (2024) but did not have pocket to complete paracentesis. Pt is on PTA lactulose and rifaximin for HE. Currently on hold for liver transplant, timing for reactivation to be discuss with transplant surgery team.  - GI consulted, appreciate recs         > 25% 25g albumin()         > Inactive on the transplant list. Reactivating depends on infection clearance and hemodynamic stability. Discussed the importance of nutrition and participating in PT/OT    - PPI BID  - Cont PTA Lactulose (goal of 3-4 BM per day)  - Cont rifaximin 550mg BID  - Cont PTA folate and high dose thiamine  - Transplant ID following,appreciate recs     MELD 3.0: 35 at 2024  8:52 AM  MELD-Na: 34 at 2024  8:52 AM  Calculated from:  Serum Creatinine: 1.64 mg/dL at 2024  4:40 AM  Serum Sodium: 127 mmol/L at 2024  4:40 AM  Total Bilirubin: 9.3 mg/dL at 2024  4:40 AM  Serum Albumin: 2.5 g/dL at 2024  4:40 AM  INR(ratio): 2.78 at 2024  8:52 AM  Age at listin years  Sex: Male at 2024  8:52 AM     #Severe protein malnutrition  #Hypoalbuminemia  - consider Nutrition consultation   - low sodium diet     Renal/Fluids/Electrolytes:  #Pre-renal ANGEL-improving  Cr elevated to 2.5 on admission, trending down to 1.64. Baseline-0.6-1.2s. Likely pre renal in setting of septic shock, decreased oral intake, and dehydration. S/p 2L NS in ED ,albumin and currently on pressor.  - trend BMP  - Hold PTA diuretics     #Hyperkalemia, Improved  Lactic acid elevated to 4.5 () improved to 2.6 ()  -Trend BMP    #Pseudohyponatremia  Baseline Na :127-130. Na of 127 corrected to 132  with elevated blood sugar.   - Trend BMP daily      Endocrine:  # Type 2 DM  # Concerning for hypoglycemic episode  #Steroid induced hyperglycemia  Pt takes PTA 36 units Tresiba.15 units Humalog with breakfast and lunch, 13 units with dinner. Received 1 day of Tresiba 30 U and 10 Aspart  prepandial TID with blood sugars remain elevated.Will transition to insulin drip during this acute illness  -Insulin gtt Started 2/25  -Hypoglycemic protocol  -Diabetes Educator consulted  -Hold PTA insulin regimen  -CHO consistent diet    ID:  # Septic Shock  # Streptococcus gallolyticus (bovis)bacteremia   Pt presented with 1-day of fatigue and AMS, found to be febrile, tachycardiac, hypotensive, elevated WBC, 2/23 Bcx 2/2 bottle grew Streptococcus gallolyticus (Streptococcus bovis group) concerning for septic shock. MRSA swap negative. UA neg. SBP is suspected with hx of ascites and suspected SBP in last hospitalization though pocket has been too small for paracentesis. Echo 2/24 with no concern for vegetation.   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 INR elevated to 4.8, fibrogen <60.Most likely in setting of end stage liver disease. Important to r/o DIC so factor 8 assay is obtained.Teg results consistent hypofibrinogenemia s/p 1 unit cryo on 2/24, Transfused two units of pRBC for hgb of 7 and I unit of platelet.Currently hemodynamically stable and no active bleed.   - S/p vitamin K x2   >Transfuse to keep Hgb >7, plt at least >20K (higher if bleeding) ,fibrinogen>100K  -  Factor 8 pending to r/o DIC  -  CTM hematoma        #Macrocytic anemia   Most  "likely multifactorial in setting of end stage liver disease. Hgb 6.6 on admission. Hgb baselines 7-8s. S/p 2 units pRBC.  - Transfuse to keep Hgb >7    #Leukocytosis  In setting of infection, as above    Musculoskeletal:  # R shoulder tear-chronic  Pt had a fall in October and now has pain in shoulder. Was told not a candidate for surgery, recently started hydrocodone on 2/19.  - hold PTA hydrocodone   - Voltaren gel      Skin:  No acute issue      General Cares/Prophylaxis:    DVT Prophylaxis: Pneumatic Compression Devices  GI Prophylaxis: PPI  Restraints: none  Family Communication: updated patient and wife at bedside  Code Status: full      Lines/tubes/drains:  - RIJ, PIV     Disposition:  - Medical ICU     Patient seen and findings/plan discussed with medical ICU staff, Dr. Thomas.      Rocio Talley MD  PGY 1  Internal Medicine    Clinically Significant Risk Factors        # Hyperkalemia: Highest K = 5.7 mmol/L in last 2 days, will monitor as appropriate  # Hyponatremia: Lowest Na = 123 mmol/L in last 2 days, will monitor as appropriate    # Hypomagnesemia: Lowest Mg = 1.5 mg/dL in last 2 days, will replace as needed   # Hypoalbuminemia: Lowest albumin = 2.4 g/dL at 2/24/2024  4:00 AM, will monitor as appropriate    # Coagulation Defect: INR = 4.83 (Ref range: 0.85 - 1.15) and/or PTT = 67 Seconds (Ref range: 22 - 38 Seconds), will monitor for bleeding  # Thrombocytopenia: Lowest platelets = 45 in last 2 days, will monitor for bleeding  # Acute Kidney Injury, unspecified: based on a >150% or 0.3 mg/dL increase in last creatinine compared to past 90 day average, will monitor renal function  # Hypertension: Noted on problem list       # DMII: A1C = 7.7 % (Ref range: <5.7 %) within past 6 months, PRESENT ON ADMISSION  # Obesity: Estimated body mass index is 35.8 kg/m  as calculated from the following:    Height as of this encounter: 1.727 m (5' 8\").    Weight as of this encounter: 106.8 kg (235 lb 7.2 oz)., " PRESENT ON ADMISSION                     ====================================  INTERVAL HISTORY:   Pt said he feels better today, denies any signs of bleeding,fever, chills, nausea, abdominal pain. Endorses tenderness in hematoma at RIJ site, no changes in size.He endorses feeling his mentation is clearer. Discussed plan with pt and wife at bedside.    OBJECTIVE:   1. VITAL SIGNS:   Temp:  [97  F (36.1  C)-98.6  F (37  C)] 97  F (36.1  C)  Pulse:  [] 92  Resp:  [10-31] 16  BP: ()/(46-65) 101/53  SpO2:  [91 %-100 %] 97 %  Resp: 16    2. INTAKE/ OUTPUT:   I/O last 3 completed shifts:  In: 2448.36 [P.O.:520; I.V.:888.36]  Out: 2100 [Urine:2100]    3. PHYSICAL EXAMINATION:  General: Sitting in chair, NAD  HEENT: atraumatic, scleral icterus, tender hematoma at RIJ site   Neuro: alter, oriented to person, place, time,tangential, overall mentation improved per wife, no asterixis, no focal deficits  Pulm/Resp: Clear breath sounds bilaterally without rhonchi, crackles or wheeze, breathing non-labored  CV: RRR  Abdomen: Soft, distended, non tender   : deferred  Incisions/Skin: jaundice, spider angioma on chest     4. LABS:   Arterial Blood Gases   No lab results found in last 7 days.  Complete Blood Count   Recent Labs   Lab 02/25/24  0122 02/24/24  1740 02/24/24  1013 02/24/24  0400 02/23/24 2013   WBC 20.1* 17.4*  --  37.1* 42.6*   HGB 7.9* 6.8* 7.0* 7.5* 7.7*   PLT 66* 45*  --  110* 120*     Basic Metabolic Panel  Recent Labs   Lab 02/25/24  0441 02/25/24  0440 02/25/24  0126 02/24/24  2229 02/24/24  1742 02/24/24  1448 02/24/24  0551 02/24/24  0400 02/23/24  0931 02/23/24  0928   NA  --  127*  --   --   --  126*  --  124*  --  123*   POTASSIUM  --  4.5  --   --   --  4.2  --  5.7*  --  5.5*   CHLORIDE  --  97*  --   --   --  96*  --  94*  --  91*   CO2  --  21*  --   --   --  21*  --  21*  --  21*   BUN  --  43.2*  --   --   --  43.5*  --  42.5*  --  35.5*   CR  --  1.64*  --   --   --  1.88*  --  2.16*  --   2.51*   * 334* 305* 248*   < > 241*   < > 355*   < > 141*    < > = values in this interval not displayed.     Liver Function Tests  Recent Labs   Lab 24  0440 24  0831 24  0400 24  1029 24  0928   AST 63*  --  59*  --  56*   ALT 23  --  26  --  25   ALKPHOS 170*  --  207*  --  191*   BILITOTAL 9.3*  --  11.7*  --  11.1*   ALBUMIN 2.5*  --  2.4*  --  2.4*   INR  --  4.83*  --  4.89* 4.18*     Coagulation Profile  Recent Labs   Lab 24  0831 24  1029 24  0928   INR 4.83* 4.89* 4.18*   PTT 67*  --  66*       5. RADIOLOGY:   Recent Results (from the past 24 hour(s))   Echo Limited   Result Value    LVEF  60-65%    [facility]    [id number]  [id number]  [id number]  [id number]^KALA^SILVINA     [facility],[place]  Echocardiography Laboratory  [address] [place] [id number]     Name: RENETTA LAI BLANCA  MRN: [id number]  : 1970  Study Date: 2024 07:44 AM  Age: 53 yrs  Gender: Male  [place] [facility] [place] Study: Endocarditis  Ordering Physician: SILVINA ARMENDARIZ  Performed By: Carolyn Anderson RDCS     BSA: 2.1 m2  Height: 68 in  Weight: 225 lb  ______________________________________________________________________________  Procedure  Limited Echocardiogram with portions of two-dimensional, color and spectral  Doppler performed.  ______________________________________________________________________________  Interpretation Summary  No significant valvular abnormalities were noted. No vegetation or mass  identified.  ______________________________________________________________________________  Left Ventricle  Global and regional left ventricular function is normal with an EF of 60-65%.     Right Ventricle  Right ventricular function, chamber size, wall motion, and thickness are  normal.     Mitral Valve  The mitral valve is normal.     Aortic Valve  Aortic valve is normal in structure and function.     Tricuspid  Valve  The tricuspid valve is normal.     Pulmonic Valve  The pulmonic valve is normal.  ______________________________________________________________________________  Report approved by: Rosendo Durham 02/24/2024 09:41 AM     ______________________________________________________________________________

## 2024-02-25 NOTE — CONSULTS
[facility]  Transplant Infectious Disease Consult Note - New Patient     Patient:  Mary Lopez, Date of birth 1970, Medical record number [id number]  Date of Visit:  02/25/2024  Consult requested by Dr. Jak Herrera for evaluation of Mary Lopez for Strep bovis bacteremia         Assessment and Recommendations:   Recommendations:  - Start ceftriaxone 2g Q24 Hrs   - Plan for total course of 10-14 days from clearance (2/24- )  - No additional blood cultures needed, unless previous collected ones turn positive  - No need for trans esophageal echocardiogram at this time  - Okay to resume listing for liver transplantation from ID perspective once, course of antibiotics has been completed    Thank you very much for this consultation. Transplant Infectious Disease will continue to follow with you.    Assessment:    53 year old male with a history of decompensated cirrhosis 2/2 EtOH c/b ascites, hepatic encephalopathy and grade I esophageal varices, T2DM, transplant candidate (currently inactive on transplant list) with recent hospitalization (1/2024) for encephalopathy and suspected SBP (no safe window to complete para) presented to the ED on 2/23 febrile, septic shock who was found to have Streptococcus bovis bacteremia.    Infectious Disease issues include:    Bacteremia, Streptococcus bovis  Patient blood cultures only 1 set on 2/23 were positive for pan-sensitive Strep bovis. No hx of prosthetic joint replacements. Exam without concern for seeding at this time. Source of infection is likely GI and or SBP. Previously unable to get paracentesis'. Clinically improving while on cefepime and vancomycin, vasopressors are now off. Recommend de-escalating therapy to ceftriaxone alone as this will cover the Strep bovis and treat SBP.  Trans thoracic echo was negative for vegetations, no indication at this time for ANEL (high risk given hx of EV). No additional blood cultures  needed and unless previously obtained cultures turn positive. White count did rise over the previous day, stress dose steroids though were initiated on 2/24 by the primary team.    - QTc interval: 392 (2/15/2024)  - Bacterial prophylaxis: None   - Pneumocystis prophylaxis: None  - Serostatus & viral prophylaxis: CMV -, EBV +  - Fungal prophylaxis: None  - Immunization status: Will need repeat series of Hep B (non-reactive on 2/15/2024)  - Gamma globulin status:   - Isolation status: Good hand hygiene.  - Code status: Full Code    Keaton Chavez MD  Infectious Diseases Fellow, PGY-4  Pager: [phone]  [facility] emy   Date: 2/25/2024         History of Infectious Disease Illness:     Mary Lopez is a 53 year old male with a history of EtOH cirrhosis (sober since 6/25/23) c/b ascites, hepatic encephalopathy and grade I esophageal varices, T2DM, transplant candidate (currently inactive on transplant list) with recent hospitalization (1/2024) for encephalopathy and suspected SBP (no safe window to complete para) presented to the ED on 2/23 febrile, septic shock who was found to have Streptococcus bovis bacteremia.    Patient was planned to be transplant on 2/15; however, the donor liver was not deemed satisfactory enough for transplantation. He also tested positive on that day with a cycle threshold of 43.6. He has not symptoms at this time for COVID or respiratory infections.    While at home, we progressively became more encephalopathic. He was noted to be fatigued and feverish by his wife. In the ED on presentation he was febrile and hypotensive. He was admitted to the hospital and sent to the ICU. Blood cultures grew out pan-susceptible Strep bovis. After blood cultures were obtained, broad spectrum antimicrobials were initiated with cefepime and vancomycin. Trans thoracic echocardiogram was negative for vegetations. CT of CAP demonstrated cirrhosis.     Outpatient has has been following with   Reynaldo as he is worked up for potential liver transplant. He has had 2 quantiferon gold tests result as indeterminate likely due to immune dysfunction 2/2 liver failure.     Upon seeing him today, his wife was at his bedside. He is feeling well, improving since he was admitted. Some neck discomfort at site of central line placement. Breathing comfortably. Has some right shoulder discomfort that he attributes to his rotator cuff tear. He has not history of joint replacements or implanted material. They live in [place] with their cats, have one daughter who is currently deployed with the [employer].      Transplants:  N/A, Postoperative day:  .  Coordinator Keaton Martinez Jr.    Review of Systems:  CONSTITUTIONAL:  Fevers on arrival  EYES: negative acute vision changes  ENT:  negative for acute hearing loss, tinnitus, sore throat  RESPIRATORY:  negative for cough, sputum or dyspnea  CARDIOVASCULAR:  negative for chest pain, palpitations  GASTROINTESTINAL:  negative for nausea, vomiting, diarrhea or constipation  GENITOURINARY:  negative for dysuria or hematuria  HEME:  No easy bruising or bleeding  INTEGUMENT:  negative for rash or pruritus  NEURO:  Negative for headache or tremor    Past Medical History:   Diagnosis Date    ANGEL (acute kidney injury) (H24)     Alcoholic cirrhosis of liver without ascites (H) 07/13/2023    Alcoholic hepatitis with ascites (H28) 10/03/2023    Alcoholic hepatitis without ascites (H28) 07/13/2023    Closed fracture of one rib of left side 09/14/2023    Concussion without loss of consciousness 03/11/2020    Decompensated hepatic cirrhosis (H) 09/15/2023    Diabetes mellitus, type 2 (H) 11/22/2023    Essential hypertension 03/11/2020    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)     Mild hyperlipidemia 12/07/2021    Persistent insomnia 07/13/2023    Portal hypertension (H) 07/13/2023    Scrotal abscess     Secondary esophageal 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 (H) 07/13/2023    Tobacco abuse  disorder 03/11/2020    Type 2 diabetes mellitus with hyperglycemia (H) 12/22/2023       Past Surgical History:   Procedure Laterality Date    CHOLECYSTECTOMY      COLONOSCOPY N/A 1/2/2024    Procedure: COLONOSCOPY, WITH POLYPECTOMY;  Surgeon: Jak Urbina MD;  Location: [facility]    CV RIGHT HEART CATH MEASUREMENTS RECORDED N/A 1/30/2024    Procedure: Right Heart Catheterization;  Surgeon: Alfred Tafoya MD;  Location: [facility]    TONSILLECTOMY      VASECTOMY         Family History   Problem Relation Age of Onset    Anxiety Disorder Mother     Depression Mother     Bipolar Disorder Mother     Chronic Obstructive Pulmonary Disease Mother     Lung Cancer Mother 81    Morbid Obesity Father     Diabetes Father     Diabetes Type 2  Brother     Substance Abuse Maternal Grandfather     Substance Abuse Paternal Grandfather     Colon Cancer No family hx of     Liver Disease No family hx of        Social History     Social History Narrative    Not on file     Social History     Tobacco Use    Smoking status: Former     Types: Cigarettes     Passive exposure: Never    Smokeless tobacco: Current     Types: Chew     Last attempt to quit: 1/1/2004    Tobacco comments:     Chew daily 1/3 of a tin per day   Vaping Use    Vaping Use: Never used   Substance Use Topics    Alcohol use: Not Currently     Alcohol/week: 12.0 standard drinks of alcohol     Types: 12 Standard drinks or equivalent per week     Comment: Sober since 6/25/2023    Drug use: Not Currently       Immunization History   Administered Date(s) Administered    COVID-19 12+ (2023-24) (Pfizer) 12/22/2023    COVID-19 MONOVALENT 12+ (Pfizer) 04/02/2021, 04/23/2021, 12/18/2021    Hepatitis B, Adult 08/23/2018    Influenza Vaccine >6 months,quad, PF 11/14/2021, 12/22/2023    Pneumococcal 20 valent Conjugate (Prevnar 20) 12/22/2023    TD,PF 7+ (Tenivac) 05/21/2005, 09/26/2017    TDAP Vaccine (Boostrix) 05/21/2005    Zoster recombinant adjuvanted  (SHINGRIX) 09/16/2022, 11/19/2022       Patient Active Problem List   Diagnosis    Primary hypertension    Tobacco abuse disorder    Mild hyperlipidemia    Morbid obesity (H)    Portal hypertensive gastropathy (H)    Splenomegaly    Secondary esophageal 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 (H)    Anemia due to unknown mechanism    Thrombocytopenia (H24)    Persistent insomnia    Bilateral leg edema    Hyponatremia    Decompensated hepatic cirrhosis (H)    Alcoholic hepatitis with ascites (H28)    Alcohol use disorder, severe, in early remission (H)    Gastric varices    Severe malnutrition (H24)    Diabetes mellitus, type 2 (H)    Type 2 diabetes mellitus with hyperglycemia (H)    Generalized muscle weakness    Pulmonary HTN (H)    Hepatic encephalopathy (H)    Liver transplant candidate    ANGEL (acute kidney injury) (H24)    Acute liver failure without hepatic coma    Sepsis with acute liver failure and septic shock without hepatic coma, due to unspecified organism (H)    Acute kidney failure, unspecified (H)            Current Medications & Allergies:      ceFEPIme  2 g Intravenous Q12H    folic acid  1 mg Oral Daily    hydrocortisone sodium succinate PF  50 mg Intravenous Q6H    insulin aspart  10 Units Subcutaneous TID AC    insulin aspart  1-10 Units Subcutaneous TID AC    insulin aspart  1-7 Units Subcutaneous At Bedtime    insulin degludec  30 Units Subcutaneous Daily    lactulose  20 g Oral TID    pantoprazole  40 mg Oral BID AC    rifaximin  550 mg Oral BID    thiamine  500 mg Intravenous TID    Followed by    [START ON 2/26/2024] thiamine  250 mg Intravenous Daily    Followed by    [START ON 3/2/2024] thiamine  100 mg Oral Daily    vancomycin  1,250 mg Intravenous Q24H       Infusions/Drips:     norepinephrine 0.03 mcg/kg/min (02/25/24 0800)       Allergies   Allergen Reactions    Food Anaphylaxis     baked beans    Pineapple Itching            Physical Exam:   Patient Vitals for the past 24 hrs:   BP Temp Temp src  Pulse Resp SpO2 Weight   02/25/24 0800 -- 97.7  F (36.5  C) Oral -- 16 -- --   02/25/24 0700 101/53 -- -- 92 16 97 % --   02/25/24 0645 97/52 -- -- 86 10 97 % --   02/25/24 0630 100/48 -- -- 82 15 95 % --   02/25/24 0615 103/50 -- -- 83 14 95 % --   02/25/24 0600 95/50 -- -- 84 16 97 % --   02/25/24 0545 99/49 -- -- 83 15 94 % --   02/25/24 0530 97/51 -- -- 82 16 96 % --   02/25/24 0515 100/54 -- -- 83 18 96 % --   02/25/24 0500 102/54 -- -- 83 10 95 % --   02/25/24 0445 99/50 -- -- 79 16 94 % --   02/25/24 0430 99/49 -- -- 80 12 94 % --   02/25/24 0415 96/46 -- -- 80 14 94 % --   02/25/24 0400 96/51 97  F (36.1  C) Oral 83 14 96 % 106.8 kg (235 lb 7.2 oz)   02/25/24 0345 98/51 -- -- 81 10 96 % --   02/25/24 0330 97/53 -- -- 81 13 96 % --   02/25/24 0315 96/53 -- -- 82 13 96 % --   02/25/24 0300 97/52 -- -- 82 10 97 % --   02/25/24 0245 97/53 -- -- 81 12 96 % --   02/25/24 0230 94/53 -- -- 81 12 96 % --   02/25/24 0215 94/52 -- -- 82 14 95 % --   02/25/24 0200 95/52 -- -- 84 15 96 % --   02/25/24 0145 98/54 -- -- 85 12 96 % --   02/25/24 0130 96/48 -- -- 88 14 98 % --   02/25/24 0115 102/57 -- -- 97 18 98 % --   02/25/24 0100 115/65 -- -- 96 22 94 % --   02/25/24 0045 102/56 -- -- 89 (!) 31 99 % --   02/25/24 0030 99/55 -- -- 83 19 95 % --   02/25/24 0015 92/55 98.6  F (37  C) Oral 83 21 96 % --   02/25/24 0000 93/54 98.6  F (37  C) Oral 82 23 96 % --   02/24/24 2345 91/53 -- -- 84 20 97 % --   02/24/24 2330 96/55 -- -- 84 24 97 % --   02/24/24 2315 95/54 -- -- 84 20 96 % --   02/24/24 2300 100/59 -- -- 87 14 92 % --   02/24/24 2255 96/52 97.8  F (36.6  C) Oral 85 21 97 % --   02/24/24 2245 95/53 98  F (36.7  C) Oral 84 20 96 % --   02/24/24 2230 93/55 97.5  F (36.4  C) Oral 83 20 96 % --   02/24/24 2215 97/54 -- -- 82 16 97 % --   02/24/24 2200 95/51 -- -- 82 19 97 % --   02/24/24 2145 90/58 -- -- 83 17 98 % --   02/24/24 2130 99/52 -- -- 78 19 97 % --   02/24/24 2115 94/52 -- -- 78 21 97 % --   02/24/24 2100 97/54  -- -- 80 22 96 % --   02/24/24 2045 98/55 -- -- 80 20 96 % --   02/24/24 2033 93/55 97.8  F (36.6  C) Oral 79 15 95 % --   02/24/24 2030 100/57 -- -- 81 18 97 % --   02/24/24 2023 98/53 98  F (36.7  C) Oral 80 19 -- --   02/24/24 2015 94/56 -- -- 80 17 100 % --   02/24/24 2006 94/55 98.1  F (36.7  C) Oral 79 10 96 % --   02/24/24 2000 -- 97.7  F (36.5  C) Axillary 78 11 96 % --   02/24/24 1945 95/56 -- -- 79 15 97 % --   02/24/24 1935 93/55 97.7  F (36.5  C) Axillary 81 12 96 % --   02/24/24 1930 98/54 -- -- 82 15 97 % --   02/24/24 1923 98/54 97.9  F (36.6  C) -- 81 16 100 % --   02/24/24 1915 90/54 -- -- 83 21 100 % --   02/24/24 1900 95/54 -- -- 81 20 97 % --   02/24/24 1845 96/55 -- -- 83 14 99 % --   02/24/24 1830 93/56 -- -- 83 12 99 % --   02/24/24 1815 96/53 -- -- 83 12 97 % --   02/24/24 1800 99/60 -- -- 92 14 99 % --   02/24/24 1745 98/63 -- -- 84 22 92 % --   02/24/24 1730 92/52 -- -- 80 20 93 % --   02/24/24 1715 (!) 89/53 -- -- 81 18 95 % --   02/24/24 1700 91/55 -- -- 82 19 95 % --   02/24/24 1645 100/56 -- -- 84 19 92 % --   02/24/24 1630 93/55 -- -- 82 18 95 % --   02/24/24 1615 (!) 88/55 -- -- 84 18 99 % --   02/24/24 1600 93/57 97.4  F (36.3  C) Axillary 83 12 97 % --   02/24/24 1545 96/59 -- -- 84 18 94 % --   02/24/24 1530 94/60 -- -- 84 22 96 % --   02/24/24 1515 (!) 83/54 -- -- 81 13 94 % --   02/24/24 1500 (!) 87/54 -- -- 79 21 95 % --   02/24/24 1445 (!) 84/49 -- -- 79 22 96 % --   02/24/24 1430 (!) 84/51 -- -- 78 18 96 % --   02/24/24 1415 (!) 86/48 -- -- 80 19 95 % --   02/24/24 1400 91/52 -- -- 84 20 96 % --   02/24/24 1345 96/58 -- -- 93 20 94 % --   02/24/24 1330 100/58 97.9  F (36.6  C) Axillary 88 15 94 % --   02/24/24 1315 100/62 -- -- 87 24 91 % --   02/24/24 1300 98/60 98.3  F (36.8  C) Axillary 88 22 96 % --   02/24/24 1245 101/65 -- -- 91 19 96 % --   02/24/24 1230 97/60 -- -- 90 18 95 % --   02/24/24 1215 95/52 97.3  F (36.3  C) Axillary 91 16 94 % --   02/24/24 1200 92/55 98.6  " F (37  C) Oral 93 16 95 % --   02/24/24 1150 106/54 98.2  F (36.8  C) Oral 92 18 96 % --   02/24/24 1145 -- -- -- 92 19 95 % --   02/24/24 1130 102/54 97.7  F (36.5  C) Oral 94 16 97 % --   02/24/24 1115 105/56 -- -- 94 10 97 % --   02/24/24 1100 100/52 -- -- 98 15 97 % --   02/24/24 1045 102/54 -- -- 95 16 95 % --   02/24/24 1030 102/54 97.6  F (36.4  C) Oral 98 18 96 % --   02/24/24 1021 -- -- -- 96 21 96 % --   02/24/24 1016 96/46 97.6  F (36.4  C) Oral 97 18 97 % --   02/24/24 1015 -- -- -- 98 21 95 % --   02/24/24 1000 100/53 -- -- 98 14 96 % --   02/24/24 0945 97/50 -- -- 100 17 96 % --     Ranges for vital signs:  Temp:  [97  F (36.1  C)-98.6  F (37  C)] 97.7  F (36.5  C)  Pulse:  [] 92  Resp:  [10-31] 16  BP: ()/(46-65) 101/53  SpO2:  [91 %-100 %] 97 %  Vitals:    02/23/24 1400 02/24/24 0400 02/25/24 0400   Weight: 102.4 kg (225 lb 12.8 oz) 106.2 kg (234 lb 2.1 oz) 106.8 kg (235 lb 7.2 oz)       Physical Examination:  GENERAL:  well-developed, well-nourished, in bed in no acute distress.  HEAD:  Head is normocephalic, atraumatic   EYES:  Eyes have icteric sclerae  ENT:  Oropharynx is moist without exudates or ulcers. Tongue is midline  NECK:  Supple. No cervical lymphadenopathy. Right internal jugular cvc in place.  LUNGS:  Clear to auscultation bilateral.   CARDIOVASCULAR:  Regular rate and rhythm with no murmur  ABDOMEN:  Normal bowel sounds, soft, nontender.   SKIN:  No acute rashes. Line in place without any surrounding erythema or exudate.  NEUROLOGIC:  Grossly nonfocal. Active x4 extremities         Laboratory Data:     No results found for: \"ACD4\", \"HIVPCR\"    Inflammatory Markers    Recent Labs   Lab Test 12/19/23  0758   PSA 0.07       Immune Globulin Studies     Recent Labs   Lab Test 11/28/23  1013 09/13/23  1028   IGG 2,499*  2,499* 2,966*   IGA  --  861*   IGG1 1,523*  --    IGG2 734*  --    IGG3 199*  --    IGG4 131*  --        Metabolic Studies       Recent Labs   Lab Test " 02/25/24  0742 02/25/24  0441 02/25/24  0440 02/24/24  1742 02/24/24  1448 02/24/24  0551 02/24/24  0400 02/23/24 2008 02/23/24  1902 02/23/24  0931 02/23/24  0928 12/26/23  0736 12/19/23  0758   NA  --   --  127*  --  126*  --  124*  --   --   --  123*   < > 131*   POTASSIUM  --   --  4.5  --  4.2  --  5.7*  --   --   --  5.5*   < > 4.0   CHLORIDE  --   --  97*  --  96*  --  94*  --   --   --  91*   < > 94*   CO2  --   --  21*  --  21*  --  21*  --   --   --  21*   < > 30*   ANIONGAP  --   --  9  --  9  --  9  --   --   --  11   < > 7   BUN  --   --  43.2*  --  43.5*  --  42.5*  --   --   --  35.5*   < > 11.1   CR  --   --  1.64*  --  1.88*  --  2.16*  --   --   --  2.51*   < > 0.78   GFRESTIMATED  --   --  50*  --  42*  --  36*  --   --   --  30*   < > >90   *   < > 334*   < > 241*   < > 355*   < >  --    < > 141*   < > 217*   A1C  --   --   --   --   --   --   --   --   --   --   --   --  7.7*   MEG  --   --  8.2*  --  8.0*  --  7.8*  --   --   --  8.2*   < > 8.5*   PHOS  --   --  4.4  --  4.2  --  4.7*  --   --   --   --    < > 2.9   MAG  --   --  1.9  --  2.0  --  1.5*  --   --   --   --    < >  --    LACT  --   --   --   --   --   --  2.6*  --  3.1*   < > 4.5*  --   --    PCAL  --   --   --   --   --   --   --   --   --   --  1.97*  --   --     < > = values in this interval not displayed.       Hepatic Studies    Recent Labs   Lab Test 02/25/24  0440 02/24/24  0400 02/23/24  0928 11/28/23  1013 10/31/23  1137 09/05/23  1242 08/17/23  1112   BILITOTAL 9.3* 11.7* 11.1*   < >  --    < > 6.3*   DBIL  --   --  6.66*   < >  --    < > 3.52*   ALKPHOS 170* 207* 191*   < >  --    < > 270*   PROTTOTAL 6.0* 6.3* 6.3*   < >  --    < > 7.3   ALBUMIN 2.5* 2.4* 2.4*   < >  --    < > 2.1*   AST 63* 59* 56*   < >  --    < > 90*   ALT 23 26 25   < >  --    < > 42   LDH  --   --   --   --   --   --  277*   JAQUELINE  --   --  64*  --  57   < >  --     < > = values in this interval not displayed.       Pancreatitis testing     Recent Labs   Lab Test 02/15/24  2049 03/12/22  1006   AMYLASE 28  --    TRIG  --  151*       Gout Labs    No lab results found.    Hematology Studies   Recent Labs   Lab Test 02/25/24  0122 02/24/24  1740 02/24/24  1013 02/24/24  0400 02/23/24 2013 02/23/24  0928 02/18/24  1202 09/19/23  1653 09/13/23  1028 08/29/23  1018 08/23/23  1018   WBC 20.1* 17.4*  --  37.1* 42.6* 28.6* 16.4*   < > 14.7*   < > 16.5*   ANEU  --   --   --   --   --   --   --   --  9.7*  --  13.5*   ANEUTAUTO  --   --   --   --   --  24.8* 13.1*   < >  --   --   --    ALYM  --   --   --   --   --   --   --   --  2.5  --  1.3   ALYMPAUTO  --   --   --   --   --  0.8 1.0   < >  --   --   --    BENOIT  --   --   --   --   --   --   --   --  1.5*  --  0.2   AMONOAUTO  --   --   --   --   --  1.5* 1.5*   < >  --   --   --    AEOS  --   --   --   --   --   --   --   --  0.4  --  0.3   AEOSAUTO  --   --   --   --   --  0.2 0.4   < >  --   --   --    ABSBASO  --   --   --   --   --  0.1 0.1   < >  --   --   --    HGB 7.9* 6.8*   < > 7.5* 7.7* 6.6* 7.2*   < > 9.2*   < > 8.0*   HCT 22.9* 19.8*  --  21.7* 21.8* 20.4* 22.2*   < > 27.1*   < > 23.4*   PLT 66* 45*  --  110* 120* 95* 85*   < > 80*   < > 111*    < > = values in this interval not displayed.       Clotting Studies    Recent Labs   Lab Test 02/25/24  0852 02/24/24  0831 02/23/24  1029 02/23/24  0928   INR 2.78* 4.83* 4.89* 4.18*   PTT  --  67*  --  66*       Iron Testing    Recent Labs   Lab Test 02/25/24  0122 02/15/24  2034 02/13/24  1615 12/26/23  0736 12/19/23  0758 08/29/23  1018 08/23/23  1018 08/17/23  1112 08/10/23  1107 08/09/23  1122 07/25/23  1702 07/15/23  1048   IRON  --   --   --   --  135  --  120  --   --  118  --  92   KE  --   --   --   --  2,948*  --  4,261*  --   --  4,611*   < >  --    MCV 95   < > 104*   < > 98   < > 98 99   < > 98   < > 111*   FOLIC  --   --   --   --   --   --  13.4  --   --   --   --   --    B12  --   --   --   --   --   --   --   --   --   --   --   "2,662*   HAPT  --   --   --   --   --   --   --  <3*  --   --   --   --    RETP  --   --  6.8*  --   --   --  5.7* 5.5*   < >  --   --   --    RETICABSCT  --   --  0.157*  --   --   --  0.137* 0.120*   < >  --   --   --     < > = values in this interval not displayed.       Markers  No lab results found.    Invalid input(s): \"FETOPROTEIN\", \"SERUM\", \"AFP\"    Autoimmune Testing  No lab results found.    Invalid input(s): \"PRO3\", \"C3\", \"C4\", \"VASPAN\"    Arterial Blood Gas Testing    Recent Labs   Lab Test 02/23/24  0928   O2PER 23        Thyroid Studies     Recent Labs   Lab Test 03/12/22  1006   TSH 2.18       Urine Studies     Recent Labs   Lab Test 02/23/24  1558 02/15/24  2015 11/09/23  1147 09/05/23  1242   URINEPH 5.0 5.0 6.0 5.5   NITRITE Negative Negative Negative Negative   LEUKEST Negative Negative Negative Trace*   WBCU <1 <1 0-5 0-5       Medication levels    Recent Labs   Lab Test 02/24/24  1013   VANCOMYCIN 9.7       CSF testing   No lab results found.    Invalid input(s): \"CADAM\", \"EVPCR\", \"ENTPCR\", \"ENTEROVIRUS\"    Body fluid stats  No lab results found.    Microbiology:  Fungal testing  No lab results found.    Invalid input(s): \"HIFUN\", \"FUNGL\"    Last Culture results   Culture   Date Value Ref Range Status   02/24/2024 No growth after 12 hours  Preliminary   02/24/2024 No growth after 12 hours  Preliminary   02/23/2024 No growth after 1 day  Preliminary   02/23/2024 Positive on the 1st day of incubation (A)  Preliminary   02/23/2024 (AA)  Preliminary    Streptococcus gallolyticus (Streptococcus bovis group)     Comment:     2 of 2 bottles   02/15/2024   Final    No Vancomycin Resistant Enterococcus species isolated   02/15/2024 <10,000 CFU/mL Mixture of Urogenital Rachel  Final         Last checks of Clostridioides difficile testing  No lab results found.    Syphilis Testing    Treponema Antibody Total   Date Value Ref Range Status   12/19/2023 Nonreactive Nonreactive Final       Quantiferon testing " "  Recent Labs   Lab Test 02/23/24 0928 02/18/24  1202 01/18/24  0807 01/09/24  1622 12/19/23  0758 12/19/23  0755   TBRES  --   --   --  Indeterminate*  --  Indeterminate*   LYMPH 3 6   < > 15   < >  --     < > = values in this interval not displayed.       Infection Studies to assess Diarrhea  No lab results found.    Invalid input(s): \"BIDYD\"    Virology:  Coronavirus-19 testing    Recent Labs   Lab Test 02/23/24  0928 02/15/24  2017   [id number] Negative Positive*   CYCLETHRES  --  43.6       Respiratory virus (non-coronavirus-19) testing    Recent Labs   Lab Test 02/23/24 0928   INFZA Negative   INFZB Negative   IRSV Negative       CMV viral loads  No lab results found.    CMV resistance testing  No lab results found.    No results found for: \"H6RES\"    No results found for: \"EBVRESINST\", \"11114\", \"EBQTC\", \"EBRES\", \"07864\", \"59018\"    No results found for: \"37865\", \"BKRES\"    Parvovirus Testing  No lab results found.    Invalid input(s): \"PRVRES\"    Adenovirus Testing  No lab results found.    Invalid input(s): \"ADENAB\", \"ADENOVIRUS\", \"ADQT\"    Hepatitis B Testing     Recent Labs   Lab Test 02/15/24  2034 08/17/23  1112   AUSAB <3.50 2.19   HBCAB Nonreactive  --    HEPBANG Nonreactive  --         Hepatitis C Antibody   Date Value Ref Range Status   02/15/2024 Nonreactive Nonreactive Final     Comment:     A nonreactive screening test result does not exclude the possibility of exposure to or infection with HCV. Nonreactive screening test results in individuals with prior exposure to HCV may be due to antibody levels below the limit of detection of this assay or lack of reactivity to the HCV antigens used in this assay. Patients with recent HCV infections (<3 months from time of exposure) may have false-negative HCV antibody results due to the time needed for seroconversion (average of 8 to 9 weeks).       CMV Antibody IgG   Date Value Ref Range Status   02/15/2024 No detectable antibody. No detectable antibody. "  Final   12/19/2023 No detectable antibody. No detectable antibody.  Final     CMV Antibody IgM   Date Value Ref Range Status   02/15/2024 Negative Negative Final     EBV Capsid Antibody IgG   Date Value Ref Range Status   02/15/2024 Positive (A) No detectable antibody. Final     Comment:     Suggests recent or past exposure.   12/19/2023 Positive (A) No detectable antibody. Final     Comment:     Suggests recent or past exposure.     EBV Capsid Antibody IgM   Date Value Ref Range Status   02/15/2024 No detectable antibody. No detectable antibody. Final       Imaging:  Recent Results (from the past 48 hour(s))   XR Chest Port 1 View    Narrative    Exam: XR CHEST PORT 1 VIEW, 2/23/2024 9:41 AM    Comparison: 2/15/2024    History: hypoxia, fever, cough    Findings:  Single AP portable semiupright view of the chest.    Trachea is midline. Stable cardiomediastinal silhouette. Unchanged  elevation of the right hemidiaphragm. Low lung volumes. Mild diffuse  interstitial opacities. Left basilar opacity at the costophrenic  angle. There is no pneumothorax or pleural effusion. Right upper  quadrant surgical clips.      Impression    Impression:   1. Retrocardiac opacities could represent infection/atelectasis.  2. Low lung volumes and findings of pulmonary edema.    I have personally reviewed the examination and initial interpretation  and I agree with the findings.    SHIVANI JUSTIN MD         SYSTEM ID:  [id number]   CT Abdomen Pelvis w/o Contrast    Narrative    EXAMINATION: CT ABDOMEN PELVIS W/O CONTRAST, 2/23/2024 11:20 AM    INDICATION: abd distention and pain, liver cirrhosis, fever,    COMPARISON STUDY: CT 2/15/2024, 2/6/2024    TECHNIQUE: CT scan of the abdomen and pelvis was performed without  contrast on multidetector CT scanner using volumetric acquisition  technique and images were reconstructed in multiple planes with  variable thickness and reviewed on dedicated workstations.     CT scan radiation dose is  optimized to minimum requisite dose using  automated dose modulation techniques.    FINDINGS:    Lower thorax: Trace left pleural effusion with overlying consolidative  opacity containing air bronchograms. Paraesophageal varices.    Abdomen/pelvis:  Liver: Cirrhotic morphology of the liver. No intrahepatic biliary  ductal dilation.    Biliary System: The gallbladder is surgically absent. No extrahepatic  biliary ductal dilation.    Pancreas: No mass or pancreatic ductal dilation.    Adrenal glands: The right adrenal gland is normal. The left adrenal  gland is difficult to characterize given adjacent splenic varices.    Spleen: Enlarged measuring approximately 18 cm in greatest axial  dimension    Kidneys: No obstructing calculus or hydronephrosis.    Gastrointestinal tract : Normal caliber small bowel.    Mesentery/peritoneum/retroperitoneum: No mass. No free air.  Small to  moderate volume ascites. Fat and fluid containing right inguinal  hernia.    Lymph nodes: No significant lymphadenopathy.    Vasculature: Patency of the major intra-abdominal vasculature cannot  be assessed on this noncontrast exam. Normal caliber aorta.  Paraesophageal and splenic varices as noted above.    Pelvis: Urinary bladder is normal.  The prostate is normal in size.    Osseous structures: No aggressive or acute osseous lesion.  Mild  degenerative changes of the spine most significant at L5-S1.      Soft tissues: Bilateral gynecomastia.  Diffuse subcutaneous anasarca.      Impression    IMPRESSION:   1. Cirrhotic morphology of the liver with sequela of portal  hypertension including splenomegaly and perisplenic/esophageal  varices. Small volume ascites.    2. No definite acute pathology is appreciated in the abdomen on this  noncontrast exam.    3. Trace left pleural effusion and overlying consolidative opacity  favored to represent atelectasis.    I have personally reviewed the examination and initial interpretation  and I agree with  the findings.    JULIO JENKINS MD         SYSTEM ID:  [id number]   XR Chest Port 1 View    Narrative    Exam: XR CHEST PORT 1 VIEW, 2024 12:14 PM    Comparison: 2024 at 0934 hours    History: line placement    Findings:  Single AP portable semiupright view of the chest.    Trachea is midline. Right IJ central venous catheter with tip  projecting over the right atrium. Stable cardiomediastinal silhouette.  Unchanged elevation of the right hemidiaphragm. Low lung volumes. Mild  diffuse interstitial opacities. Left basilar opacity at the  costophrenic angle. There is no pneumothorax or pleural effusion.  Right upper quadrant surgical clips.      Impression    Impression:   1. Interval placement of right IJ central venous catheter with tip  projecting over the right atrium.  2. Stable retrocardiac opacities which could represent  infection/atelectasis.  3. Similar low lung volumes and findings of pulmonary edema.    I have personally reviewed the examination and initial interpretation  and I agree with the findings.    SHIVANI JUSTIN MD         SYSTEM ID:  [id number]   Echo Limited   Result Value    LVEF  60-65%    Narrative    [id number]  [id number]  [id number]  [id number]^KALA^SILVINA     [facility],Early  Echocardiography Laboratory  [address] [place], [place] [address]     Name: REENTTA LAI BLANCA  MRN: [id number]  : 1970  Study Date: 2024 07:44 AM  Age: 53 yrs  Gender: Male  Patient Location: [facility]  Reason For Study: Endocarditis  Ordering Physician: SILVINA ARMENDARIZ  Performed By: Carolyn Anderson RDCS     BSA: 2.1 m2  Height: 68 in  Weight: 225 lb  ______________________________________________________________________________  Procedure  Limited Echocardiogram with portions of two-dimensional, color and spectral  Doppler performed.  ______________________________________________________________________________  Interpretation Summary  No significant valvular  abnormalities were noted. No vegetation or mass  identified.  ______________________________________________________________________________  Left Ventricle  Global and regional left ventricular function is normal with an EF of 60-65%.     Right Ventricle  Right ventricular function, chamber size, wall motion, and thickness are  normal.     Mitral Valve  The mitral valve is normal.     Aortic Valve  Aortic valve is normal in structure and function.     Tricuspid Valve  The tricuspid valve is normal.     Pulmonic Valve  The pulmonic valve is normal.  ______________________________________________________________________________  Report approved by: Rosendo Durham 02/24/2024 09:41 AM     ______________________________________________________________________________

## 2024-02-26 ENCOUNTER — APPOINTMENT (OUTPATIENT)
Dept: PHYSICAL THERAPY | Facility: CLINIC | Age: 54
DRG: 871 | End: 2024-02-26
Payer: COMMERCIAL

## 2024-02-26 ENCOUNTER — APPOINTMENT (OUTPATIENT)
Dept: OCCUPATIONAL THERAPY | Facility: CLINIC | Age: 54
DRG: 871 | End: 2024-02-26
Payer: COMMERCIAL

## 2024-02-26 LAB
ALBUMIN SERPL BCG-MCNC: 2.4 G/DL (ref 3.5–5.2)
ALP SERPL-CCNC: 190 U/L (ref 40–150)
ALT SERPL W P-5'-P-CCNC: 36 U/L (ref 0–70)
AMMONIA PLAS-SCNC: 46 UMOL/L (ref 16–60)
ANION GAP SERPL CALCULATED.3IONS-SCNC: 8 MMOL/L (ref 7–15)
AST SERPL W P-5'-P-CCNC: 92 U/L (ref 0–45)
BILIRUB SERPL-MCNC: 8.4 MG/DL
BUN SERPL-MCNC: 41.6 MG/DL (ref 6–20)
CALCIUM SERPL-MCNC: 8.6 MG/DL (ref 8.6–10)
CHLORIDE SERPL-SCNC: 100 MMOL/L (ref 98–107)
CREAT SERPL-MCNC: 1.48 MG/DL (ref 0.67–1.17)
DEPRECATED HCO3 PLAS-SCNC: 22 MMOL/L (ref 22–29)
EGFRCR SERPLBLD CKD-EPI 2021: 57 ML/MIN/1.73M2
ERYTHROCYTE [DISTWIDTH] IN BLOOD BY AUTOMATED COUNT: 16.6 % (ref 10–15)
FACT VIII ACT/NOR PPP: 81 % (ref 55–200)
GLUCOSE BLDC GLUCOMTR-MCNC: 104 MG/DL (ref 70–99)
GLUCOSE BLDC GLUCOMTR-MCNC: 105 MG/DL (ref 70–99)
GLUCOSE BLDC GLUCOMTR-MCNC: 115 MG/DL (ref 70–99)
GLUCOSE BLDC GLUCOMTR-MCNC: 118 MG/DL (ref 70–99)
GLUCOSE BLDC GLUCOMTR-MCNC: 122 MG/DL (ref 70–99)
GLUCOSE BLDC GLUCOMTR-MCNC: 122 MG/DL (ref 70–99)
GLUCOSE BLDC GLUCOMTR-MCNC: 123 MG/DL (ref 70–99)
GLUCOSE BLDC GLUCOMTR-MCNC: 125 MG/DL (ref 70–99)
GLUCOSE BLDC GLUCOMTR-MCNC: 128 MG/DL (ref 70–99)
GLUCOSE BLDC GLUCOMTR-MCNC: 165 MG/DL (ref 70–99)
GLUCOSE BLDC GLUCOMTR-MCNC: 302 MG/DL (ref 70–99)
GLUCOSE BLDC GLUCOMTR-MCNC: 331 MG/DL (ref 70–99)
GLUCOSE BLDC GLUCOMTR-MCNC: 337 MG/DL (ref 70–99)
GLUCOSE BLDC GLUCOMTR-MCNC: 341 MG/DL (ref 70–99)
GLUCOSE BLDC GLUCOMTR-MCNC: 357 MG/DL (ref 70–99)
GLUCOSE SERPL-MCNC: 130 MG/DL (ref 70–99)
HCT VFR BLD AUTO: 22.2 % (ref 40–53)
HGB BLD-MCNC: 7.6 G/DL (ref 13.3–17.7)
INR PPP: 3.04 (ref 0.85–1.15)
MCH RBC QN AUTO: 32.6 PG (ref 26.5–33)
MCHC RBC AUTO-ENTMCNC: 34.2 G/DL (ref 31.5–36.5)
MCV RBC AUTO: 95 FL (ref 78–100)
PLATELET # BLD AUTO: 58 10E3/UL (ref 150–450)
POTASSIUM SERPL-SCNC: 3.8 MMOL/L (ref 3.4–5.3)
PROT SERPL-MCNC: 5.9 G/DL (ref 6.4–8.3)
RBC # BLD AUTO: 2.33 10E6/UL (ref 4.4–5.9)
SODIUM SERPL-SCNC: 130 MMOL/L (ref 135–145)
WBC # BLD AUTO: 22.4 10E3/UL (ref 4–11)

## 2024-02-26 PROCEDURE — 85027 COMPLETE CBC AUTOMATED: CPT

## 2024-02-26 PROCEDURE — 36415 COLL VENOUS BLD VENIPUNCTURE: CPT

## 2024-02-26 PROCEDURE — 85610 PROTHROMBIN TIME: CPT

## 2024-02-26 PROCEDURE — 97161 PT EVAL LOW COMPLEX 20 MIN: CPT | Mod: GP

## 2024-02-26 PROCEDURE — 200N000002 HC R&B ICU UMMC

## 2024-02-26 PROCEDURE — 97530 THERAPEUTIC ACTIVITIES: CPT | Mod: GO

## 2024-02-26 PROCEDURE — 250N000012 HC RX MED GY IP 250 OP 636 PS 637

## 2024-02-26 PROCEDURE — 99291 CRITICAL CARE FIRST HOUR: CPT | Mod: GC | Performed by: INTERNAL MEDICINE

## 2024-02-26 PROCEDURE — 99233 SBSQ HOSP IP/OBS HIGH 50: CPT | Performed by: INTERNAL MEDICINE

## 2024-02-26 PROCEDURE — 97165 OT EVAL LOW COMPLEX 30 MIN: CPT | Mod: GO

## 2024-02-26 PROCEDURE — 250N000011 HC RX IP 250 OP 636

## 2024-02-26 PROCEDURE — 97530 THERAPEUTIC ACTIVITIES: CPT | Mod: GP

## 2024-02-26 PROCEDURE — 250N000011 HC RX IP 250 OP 636: Performed by: STUDENT IN AN ORGANIZED HEALTH CARE EDUCATION/TRAINING PROGRAM

## 2024-02-26 PROCEDURE — 250N000013 HC RX MED GY IP 250 OP 250 PS 637

## 2024-02-26 PROCEDURE — 87040 BLOOD CULTURE FOR BACTERIA: CPT

## 2024-02-26 PROCEDURE — 82040 ASSAY OF SERUM ALBUMIN: CPT

## 2024-02-26 PROCEDURE — 97116 GAIT TRAINING THERAPY: CPT | Mod: GP

## 2024-02-26 PROCEDURE — 250N000013 HC RX MED GY IP 250 OP 250 PS 637: Performed by: STUDENT IN AN ORGANIZED HEALTH CARE EDUCATION/TRAINING PROGRAM

## 2024-02-26 PROCEDURE — 82140 ASSAY OF AMMONIA: CPT

## 2024-02-26 PROCEDURE — 99233 SBSQ HOSP IP/OBS HIGH 50: CPT | Performed by: NURSE PRACTITIONER

## 2024-02-26 PROCEDURE — 99233 SBSQ HOSP IP/OBS HIGH 50: CPT | Mod: GC | Performed by: STUDENT IN AN ORGANIZED HEALTH CARE EDUCATION/TRAINING PROGRAM

## 2024-02-26 RX ORDER — DEXTROSE MONOHYDRATE 25 G/50ML
25-50 INJECTION, SOLUTION INTRAVENOUS
Status: DISCONTINUED | OUTPATIENT
Start: 2024-02-26 | End: 2024-03-02 | Stop reason: HOSPADM

## 2024-02-26 RX ORDER — LANOLIN ALCOHOL/MO/W.PET/CERES
6 CREAM (GRAM) TOPICAL AT BEDTIME
Status: DISCONTINUED | OUTPATIENT
Start: 2024-02-26 | End: 2024-02-27

## 2024-02-26 RX ORDER — ACETAMINOPHEN 325 MG/1
325 TABLET ORAL EVERY 6 HOURS PRN
Status: DISCONTINUED | OUTPATIENT
Start: 2024-02-26 | End: 2024-03-02 | Stop reason: HOSPADM

## 2024-02-26 RX ORDER — NICOTINE POLACRILEX 4 MG
15-30 LOZENGE BUCCAL
Status: DISCONTINUED | OUTPATIENT
Start: 2024-02-26 | End: 2024-03-02 | Stop reason: HOSPADM

## 2024-02-26 RX ADMIN — MIDODRINE HYDROCHLORIDE 10 MG: 5 TABLET ORAL at 12:43

## 2024-02-26 RX ADMIN — HYDROCORTISONE SODIUM SUCCINATE 50 MG: 100 INJECTION, POWDER, FOR SOLUTION INTRAMUSCULAR; INTRAVENOUS at 03:51

## 2024-02-26 RX ADMIN — THIAMINE HYDROCHLORIDE 250 MG: 100 INJECTION, SOLUTION INTRAMUSCULAR; INTRAVENOUS at 07:57

## 2024-02-26 RX ADMIN — RIFAXIMIN 550 MG: 550 TABLET ORAL at 21:28

## 2024-02-26 RX ADMIN — LACTULOSE 20 G: 20 SOLUTION ORAL at 21:27

## 2024-02-26 RX ADMIN — PANTOPRAZOLE SODIUM 40 MG: 40 TABLET, DELAYED RELEASE ORAL at 07:56

## 2024-02-26 RX ADMIN — INSULIN GLARGINE 15 UNITS: 100 INJECTION, SOLUTION SUBCUTANEOUS at 10:11

## 2024-02-26 RX ADMIN — MIDODRINE HYDROCHLORIDE 10 MG: 5 TABLET ORAL at 03:51

## 2024-02-26 RX ADMIN — FOLIC ACID 1 MG: 1 TABLET ORAL at 07:56

## 2024-02-26 RX ADMIN — LACTULOSE 20 G: 20 SOLUTION ORAL at 07:56

## 2024-02-26 RX ADMIN — RIFAXIMIN 550 MG: 550 TABLET ORAL at 07:56

## 2024-02-26 RX ADMIN — LACTULOSE 20 G: 20 SOLUTION ORAL at 13:48

## 2024-02-26 RX ADMIN — Medication 6 MG: at 21:27

## 2024-02-26 RX ADMIN — PANTOPRAZOLE SODIUM 40 MG: 40 TABLET, DELAYED RELEASE ORAL at 16:06

## 2024-02-26 RX ADMIN — HYDROCORTISONE SODIUM SUCCINATE 50 MG: 100 INJECTION, POWDER, FOR SOLUTION INTRAMUSCULAR; INTRAVENOUS at 16:03

## 2024-02-26 RX ADMIN — MIDODRINE HYDROCHLORIDE 10 MG: 5 TABLET ORAL at 21:28

## 2024-02-26 RX ADMIN — CEFTRIAXONE SODIUM 2 G: 2 INJECTION, POWDER, FOR SOLUTION INTRAMUSCULAR; INTRAVENOUS at 16:04

## 2024-02-26 NOTE — PROGRESS NOTES
MEDICAL ICU PROGRESS NOTE  02/26/2024      Date of Service (when I saw the patient): 02/26/2024    ASSESSMENT: Mary Lopez is a 53 year old male with a history of EtOH cirrhosis (sober since 6/25/23) c/b ascites, hepatic encephalopathy and grade I esophageal varices, T2DM, transplant candidate (currently inactive on transplant list) with recent hospitalization (1/2024) for encephalopathy and suspected SBP (no safe window to complete para) presented to the ED on 2/23 febrile, hypotensive with AMS and encephalopathy, now with strep bovis bacteriemia admitted to MICU for septic shock requiring pressors.     CHANGES and MAJOR THINGS TODAY:   -Ammonia down to 46 (from 64)  -Transitioned off Insulin gtt  -Glargine 15u BID  -Decrease hydrocortisone to 50mg q12h (from q6h)  -Start melatonin 6mg at bedtime  -stopped trending Bcx    PLAN:    Neuro:  # Pain and sedation  - RASS goal 0 to -1     # Acute on Chronic Metabolic Encephalopathy (HE vs Sepsis)  # Concerning for delirium  Pt with ESLD c/b hepatic encephalopathy on lactulose and rifaximin. Ammonia level was 64 (2/23) down to 46 (02/26) and asterixis on initial exam. Per chart review, pt seems to have a history of waxing and waning of mental status, last presented to hospital in 1/2024. Per wife, pt seems to be back on his baseline since (02/24). Acute illness, being on stress-dose steroid, and sleep interruption may also contribute to his encephalopathy. Pt is on PTA Doxepin and PRN melatonin.  - cont PTA lactulose and rifaximin as below  - treating septic shock as below  - weaning off steroids as below  - started melatonin 6mg at bedtime (will hold PTA Doxepin given it can lower BP)  - minimize sleep interruption      # Alcohol Use Dependence, in remission  Sober since 06/25/2023.      Pulmonary:  No acute issue.     Cardiovascular:  #Septic shock  #Hypotension  Pt presented with AMS, found to be febrile, tachycardiac, and hypotensive requiring minimal  pressor. Bcx ()  bottle came back positive for strep bovis, concerning for septic shock. Echo  with no concern for vegetation. Stress-dose steroid was started to help with the BP, currently going down on levophed so will try to wean off steroid.  - MAP goal > 65  - Wean off Levophed gtt as able  - Cont Midodrine 10mg q8hr  - Continue Ceftriaxone 2g Q24H(-*)  - Weaning off Hydrocortisone 50mg Q6H to Q12H on . Plan to go down to daily on  and then stop it.     GI/Nutrition:  #Decompensated cirrhosis  Alcohol Cirrhosis MELD 3.0-34  #Alpha-1 antitrypsin MZ heterozygote, C282Y heterozygote  #Hepatic Encephalopathy  #Grade I Esophageal Varices, no Hx of variceal bleed  Pt initially presented on 2/15 for Liver transplant but found to be COVID+ and donor liver transplant was ineligible. Most recent EGD in 2023 found to have G1 EV/GOV Type1 with no hx of bleeding. History of suspected SBP in last admission (2024) but did not have pocket to complete paracentesis. Pt is on PTA lactulose and rifaximin for HE. Currently inactive for liver transplant, timing for reactivation depending on infection clearance and HDS.  - GI consulted, appreciate recs         > 25% 25g albumin()         > Inactive on the transplant list. Reactivating depends on infection clearance and hemodynamic stability. Discussed the importance of nutrition and participating in PT/OT    - PPI BID  - Cont PTA Lactulose (goal of 3-4 BM per day)  - Cont rifaximin 550mg BID  - Cont PTA folate and high dose thiamine     MELD 3.0: 34 at 2024  3:43 AM  MELD-Na: 33 at 2024  3:43 AM  Calculated from:  Serum Creatinine: 1.48 mg/dL at 2024  3:43 AM  Serum Sodium: 130 mmol/L at 2024  3:43 AM  Total Bilirubin: 8.4 mg/dL at 2024  3:43 AM  Serum Albumin: 2.4 g/dL at 2024  3:43 AM  INR(ratio): 3.04 at 2024  3:43 AM  Age at listin years  Sex: Male at 2024  3:43 AM     #Severe protein  malnutrition  #Hypoalbuminemia  - consider Nutrition consultation   - Low sodium diet     Renal/Fluids/Electrolytes:  #Acute Kidney Injury ,likely shock/prerenal  Cr elevated to 2.5 on admission, trending down to 1.48. Baseline-0.6-1.2s. Likely pre-renal in setting of septic shock, d S/p 2L NS in ED, albumin and currently off levophed, started on midodrine.  - Trend BMP daily   - Hold PTA diuretics     #Hyperkalemia, resolved  K+ elevated to 5.7 (02/24) improved to 3.8 (02/26) s/p lokelma x1. Also on PTA lactulose and rifaximin.  -Trend BMP daily     #Pseudohyponatremia  Baseline Na:127-130. Na of 127 corrected to 132 with elevated blood sugar.   - Trend BMP daily      Endocrine:  # Type 2 DM  # Concerning for hypoglycemic episode  #Steroid induced hyperglycemia  Pt takes PTA 36 units Tresiba.15 units Humalog with breakfast and lunch, 13 units with dinner. Received 1 day of Tresiba 30 U and 10 Aspart prepandial TID but blood sugars remain elevated, so changed to insulin gtt (2/25) with improved glucose level. Will transition off the gtt given sleep interruption at night. Started on Glargine 15 BID on 2/26 and if glucose continues to be elevated, can add on meal time coverage.  - Changed to Glargine 15 BID   - MDSSI  - Hypoglycemic protocol  - Diabetes Educator consulted  - Hold PTA insulin regimen  - CHO consistent diet    ID:  # Septic Shock  # Streptococcus gallolyticus (bovis)bacteremia   Pt presented with 1-day of fatigue and AMS, found to be febrile, tachycardiac, hypotensive, elevated WBC, 2/23 Bcx 2/2 bottle grew Streptococcus gallolyticus (Streptococcus bovis group) concerning for septic shock. MRSA swap negative. UA neg. SBP is suspected with hx of ascites and suspected SBP in last hospitalization though pocket has been too small for paracentesis. Echo 2/24 with no concern for vegetation. Bcx has been negative since 2/24, so will stop trending for now.  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 on 2/24. INR elevated to 4.8, fibrogen <60. Most likely in setting of end stage liver disease. Important to r/o DIC so factor 8 assay is obtained. Teg results consistent hypofibrinogenemia and thrombocytopenia vs platelet dysfunction. Currently hemodynamically stable and no active bleed.  - Transfuse to keep Hgb >7, plt at least >20K (higher if bleeding), fibrinogen>100K  > S/p vitamin K x2  > s/p 3 units pRBC  > s/p 2 units cryo   > s/p 1 unit plts  - Factor 8 pending to r/o DIC  - CTM hematoma        #Macrocytic anemia   Most likely multifactorial in setting of end stage liver disease. Hgb 6.6 on admission. Hgb baselines 7-8s. S/p 3 units pRBC.  - Transfuse to keep Hgb >7    #Leukocytosis  In setting of infection, as above and currently also on hydrocortisone.   - weaning off steroid     Musculoskeletal:  # R shoulder tear-chronic  Pt had a fall in October and now has pain in shoulder. Was told not a candidate for surgery, recently started hydrocodone on 2/19.  - hold PTA hydrocodone   - Voltaren gel      Skin:  No acute issue      General Cares/Prophylaxis:    DVT Prophylaxis: Pneumatic Compression Devices  GI Prophylaxis: PPI  Restraints: none  Family Communication: updated patient and wife at bedside  Code Status: full      Lines/tubes/drains:  - RIJ, PIV     Disposition:  - Potentially to the medicine floor today if able to maintain MAP>65 while off of pressor     Patient seen and findings/plan discussed with medical ICU staff,  Dr. Duron.      Keely Grimaldo, MS4  [employer]    Resident/Fellow Attestation   I, Rocio Talley MD, was present with the medical/JOSELYN student who participated in the service and in the documentation of the note.  I have verified the history and personally performed the physical exam and medical decision making.  I agree with the assessment and plan of care as documented in the note.      Rocio Talley MD  PGY1  Date of Service (when I saw the patient): 02/26/24     Attending note:  I, Lydia Duron MD, was present with the medical/JOSELYN student who participated in the service and in the documentation of the note.  I have verified the history and personally performed the physical exam and medical decision making.  I agree with the assessment and plan of care as documented in the note.  Patient seen, examined and dicussed with the Resident physicians. All data reviewed including vital signs, medications, laboratory studies, and imaging.  Agree with the assessment and plan as outlined in the above note. The patient remains critically ill with ESLD, septic shock, encephalopathy.  I personally adjusted the vasopressors to treat the septic shock and followed mental status in the setting of encephalopathy. Renal function remains good. Will assess off vasopressors today, continue midodrine.    Total Critical Care time excluding time for teaching and procedures was 40 minutes    Lydia Duron MD  [phone]    Clinically Significant Risk Factors         # Hyponatremia: Lowest Na = 126 mmol/L in last 2 days, will monitor as appropriate      # Hypoalbuminemia: Lowest albumin = 2.4 g/dL at 2/26/2024  3:43 AM, will monitor as appropriate    # Coagulation Defect: INR = 3.04 (Ref range: 0.85 - 1.15) and/or PTT = 67 Seconds (Ref range: 22 - 38 Seconds), will monitor for bleeding  # Thrombocytopenia: Lowest platelets = 45 in last 2 days, will monitor for bleeding  # Acute Kidney Injury, unspecified: based on a  ">150% or 0.3 mg/dL increase in last creatinine compared to past 90 day average, will monitor renal function  # Hypertension: Noted on problem list        # Obesity: Estimated body mass index is 36.81 kg/m  as calculated from the following:    Height as of this encounter: 1.727 m (5' 8\").    Weight as of this encounter: 109.8 kg (242 lb 1 oz)., PRESENT ON ADMISSION               ====================================  INTERVAL HISTORY:   Pt was frustrated this morning about interrupted sleep. Denies any signs of bleeding, fever, chills, nausea, abdominal pain. Denies any pain at RIJ site. Discussed plan with pt and wife at bedside.    OBJECTIVE:   1. VITAL SIGNS:   Temp:  [97.3  F (36.3  C)-98.9  F (37.2  C)] 97.6  F (36.4  C)  Pulse:  [68-98] 72  Resp:  [8-20] 11  BP: ()/(43-70) 97/53  SpO2:  [90 %-100 %] 94 %  Resp: 11    2. INTAKE/ OUTPUT:   I/O last 3 completed shifts:  In: 1480.08 [P.O.:920; I.V.:560.08]  Out: 1100 [Urine:1100]    3. PHYSICAL EXAMINATION:  General: laying in bed, NAD  HEENT: atraumatic, scleral icterus, tender to touch hematoma at RIJ site   Neuro: alter, oriented to person, place, time, tangential, overall mentation improved per wife, no asterixis or clonus, no focal deficits  Pulm/Resp: Clear breath sounds bilaterally without rhonchi, crackles or wheeze, breathing non-labored  CV: RRR  Abdomen: Soft, distended, non tender   : deferred  Extremity: 2+ pitting edema on BLE up to level of calf  Incisions/Skin: jaundice, spider angioma on chest     4. LABS:   Arterial Blood Gases   No lab results found in last 7 days.  Complete Blood Count   Recent Labs   Lab 02/26/24  0343 02/25/24  0122 02/24/24  1740 02/24/24  1013 02/24/24  0400   WBC 22.4* 20.1* 17.4*  --  37.1*   HGB 7.6* 7.9* 6.8* 7.0* 7.5*   PLT 58* 66* 45*  --  110*     Basic Metabolic Panel  Recent Labs   Lab 02/26/24  0629 02/26/24  0523 02/26/24  0433 02/26/24  0343 02/25/24  0441 02/25/24  0440 02/24/24  1742 02/24/24  1448 " 02/24/24  0551 02/24/24  0400   NA  --   --   --  130*  --  127*  --  126*  --  124*   POTASSIUM  --   --   --  3.8  --  4.5  --  4.2  --  5.7*   CHLORIDE  --   --   --  100  --  97*  --  96*  --  94*   CO2  --   --   --  22  --  21*  --  21*  --  21*   BUN  --   --   --  41.6*  --  43.2*  --  43.5*  --  42.5*   CR  --   --   --  1.48*  --  1.64*  --  1.88*  --  2.16*   * 128* 123* 130*   < > 334*   < > 241*   < > 355*    < > = values in this interval not displayed.     Liver Function Tests  Recent Labs   Lab 02/26/24 0343 02/25/24 0852 02/25/24 0440 02/24/24  0831 02/24/24 0400 02/23/24 1029 02/23/24  0928   AST 92*  --  63*  --  59*  --  56*   ALT 36  --  23  --  26  --  25   ALKPHOS 190*  --  170*  --  207*  --  191*   BILITOTAL 8.4*  --  9.3*  --  11.7*  --  11.1*   ALBUMIN 2.4*  --  2.5*  --  2.4*  --  2.4*   INR 3.04* 2.78*  --  4.83*  --  4.89* 4.18*     Coagulation Profile  Recent Labs   Lab 02/26/24 0343 02/25/24 0852 02/24/24  0831 02/23/24  1029 02/23/24  0928   INR 3.04* 2.78* 4.83* 4.89* 4.18*   PTT  --   --  67*  --  66*       5. RADIOLOGY:   No results found for this or any previous visit (from the past 24 hour(s)).

## 2024-02-26 NOTE — PROGRESS NOTES
ICU End of Shift Summary. See flowsheets for vital signs and detailed assessment.    Changes this shift: Mentation remains unchanged. Forgetful and intermediately impulsive. Bed alarm on. Denies pain. SR 70-90s. PO midodrine increased to TID. Levo titrated to MAP goal >60. Afebrile. Tolerating RA. Insulin gtt infusing @ alg. 4. Adequate urine and stool output. Meeting hgb and plt goals.    Plan: Reorient. Delirium precautions. Wean Levo as appropriate. Continue plan of care. PT/OT.

## 2024-02-26 NOTE — PROGRESS NOTES
ICU End of Shift Summary. See flowsheets for vital signs and detailed assessment.    Changes this shift: No acute changes. Levophed titrated off, MAP >65. Right internal jugular removed per order. Room air. Continues to be slightly confused and forgetful, easily re-directable. A x2-3. Insulin gtt off and transitioned to subcutaneous- MD aware of higher blood glucose in 300s.   Wife at bedside.     Plan:  Continue to monitor and notify care team of any changes. Pt transferred to [facility] team and has I[facility] orders. Waiting for bed on IMC.

## 2024-02-26 NOTE — PROGRESS NOTES
[facility]  Transplant Infectious Disease Progress Note     Patient:  Mary Lopez, Date of birth 1970, Medical record number [id number]  Date of Visit:  02/26/2024         Assessment and Recommendations:   Recommendations:  - Continue ceftriaxone 2g daily through 3/8/24 to complete a fourteen day course (from 2/24/24) for the 2/23/24 Strep gallolyticus bacteremia of uncertain source (although most likely gut translocation).  - No other antimicrobial is presently needed.  - From an ID perspective, he could be listed now and proceed to liver transplant at any point -- i.e., can transplant before the antibiotic course is finished.  - A ANEL is not needed (given the rapid bacteremia clearance / defervecence and sole single positive blood culture).  - Would simply continue to monitor the WBC.  - Would favor giving a first dose of a TwinRix (hepatitis A and B) vaccination series now, pre-transplant.    The case was discussed with the Hepatology service today.  Transplant ID will follow with you.    Jimmy Conley MD  Pager [phone]    Assessment:  A 53 year old gentleman with a history of decompensated cirrhosis 2/2 EtOH c/b ascites, hepatic encephalopathy and grade I esophageal varices, T2DM, transplant candidate (currently inactive on transplant list) with recent hospitalization (1/2024) for encephalopathy and suspected SBP (no safe window to complete para) presented to the ED on 2/23/24 febrile in septic shock and was found to have Streptococcus gallolyticus (bovis group) bacteremia which cleared quickly.  He is improving.    Infectious Disease issues:    Cleared Streptococcus bovis 2/23/24 bacteremia:  Only one of two sets of blood cultures drawn on 2/23/24 were positive for the pan-sensitive Strep gallolyticus (bovis group).  The source of the bacteremia is uncertain, but was probably transient gut translocation, since he had a quite normal (one small removed polyp)  colonoscopy on 1/2/24, no recent GI bleeding, has no history of prosthetic joint replacements, no central lines, and an exam without concern for other non-GI sources or for seeding at this time.  There was little evidence for active SBP (although he had an episode of suspected SBP recently in 1/2024).  Paracentesis has not been able to be obtained in the past.  Nevertheless, the source of Strep bacteremia was likely GI and or SBP. He quickly defervesced with return of hemodynamic stability and clinically improved while on initial empiric cefepime and vancomycin, so his antibiotic therapy was de-escalated to ceftriaxone alone on 2/25/24 --  that will cover the Strep bovis and treat any SBP.  A transthoracic echo was negative for vegetations, no there is indication for a ANEL.  Three pairs of daily blood cultures from 2/24 - 26/24 are without growth to date, so no additional surveillance blood cultures are needed.  Given the uncertain (though likely GI) source of the bacteremia, a fourteen day course of antibiotic therapy (through 3/6/24 is indicated, but his transplant does not need to wait on that.  With the cleared bacteremia and at now four days of antibiotic therapy, he could proceed to transplant at any time from an ID standpoint.    2/23/24 leukocytosis, persisting:  His peripheral leukocytosis peaked at 42.6 post-admission on 2/23/24, then fell to 17.4 on 2/24/24 PM, but now is up a bit on 2/26/24 to 22.4.  Very likely, stress dose steroids initiated post-admission explain this.  A secondary site of Strep seeding is unlikely.    - QTc interval: 392 (2/15/2024)  - Bacterial prophylaxis: None   - Pneumocystis prophylaxis: None  - Serostatus & viral prophylaxis: CMV -, EBV +  - Fungal prophylaxis: None  - Immunization status: Will need repeat series of Hep B (non-reactive on 2/15/2024)  - Gamma globulin status:   - Isolation status: Routine.  - Code status: Full Code        Interval History:   Mr. Lopez has  defervesced from admission and now been afebrile (T max 98.9 degrees F) since 2/23/24 midday.  His peripheral leukocytosis peaked at 42.6 post-admission on 2/23/24, then fell to 17.4 on 2/24/24 PM, but now is up a bit today to 22.4.  An initial procalcitonin of 1.97 has not been repeated (with a creatinine down to 1.48 today versus 2.51 on admission).  He is on ceftriaxone (since 2/25/24 PM, previously cefepime 2/23 - 25/24) for the 2/23/24 (one of two cultures, ceftriaxone PORSHA < 0.25, penicillin PORSHA < 0.06) Strep gallolyticus bacteremia.  Twice daily blood cultures from 2/24 - 26/24 are without growth to date.  A 2/23/24 nares MRSA PCR screen was negative as were influenza / RSV / SARS CoV-2 PCRs.  A 2/24/24 TTE showed no valvular abnormalities.  A 2/23/24 chest x-ray showed bilateral pulmonary edema.  A 2/23/24 abdominal CT showed an ongoing cirrhotic liver with portal hypertension stigmata including small volume ascites but no new pathology.        History of Infectious Disease Illness (copied from the 2/25/24 Transplant ID Consult Note):   A 53 year old gentleman with a history of EtOH cirrhosis (sober since 6/25/23) c/b ascites, hepatic encephalopathy and grade I esophageal varices, T2DM, transplant candidate (currently inactive on transplant list) with recent hospitalization (1/2024) for encephalopathy and suspected SBP (no safe window to complete para) presented to the ED on 2/23 febrile, septic shock who was found to have Streptococcus bovis bacteremia.  Patient was planned to be transplant on 2/15; however, the donor liver was not deemed satisfactory enough for transplantation. He also tested positive on that day with a cycle threshold of 43.6. He has not symptoms at this time for COVID or respiratory infections.  While at home, he progressively became more encephalopathic. He was noted to be fatigued and feverish by his wife. In the ED on presentation he was febrile and hypotensive. He was admitted to the  hospital and sent to the ICU. Blood cultures grew out pan-susceptible Strep bovis. After blood cultures were obtained, broad spectrum antimicrobials were initiated with cefepime and vancomycin. Trans thoracic echocardiogram was negative for vegetations. CT of CAP demonstrated cirrhosis.   Outpatient has has been following with Dr. Jacobs as he was worked up for potential liver transplant. He has had 2 Quantiferon gold tests result as indeterminate likely due to immune dysfunction 2/2 liver failure.111Upon seeing him today, his wife was at his bedside. He is feeling well, improving since he was admitted. Some neck discomfort at site of central line placement. Breathing comfortably. Has some right shoulder discomfort that he attributes to his rotator cuff tear. He has not history of joint replacements or implanted material. They live in [place] with their cats, have one daughter who is currently deployed with the [employer].      Transplants:  N/A    Review of Systems:  CONSTITUTIONAL:  Fevers on arrival  EYES: negative acute vision changes  ENT:  negative for acute hearing loss, tinnitus, sore throat  RESPIRATORY:  negative for cough, sputum or dyspnea  CARDIOVASCULAR:  negative for chest pain, palpitations  GASTROINTESTINAL:  negative for nausea, vomiting, diarrhea or constipation  GENITOURINARY:  negative for dysuria or hematuria  HEME:  No easy bruising or bleeding  INTEGUMENT:  negative for rash or pruritus  NEURO:  Negative for headache or tremor    Past Medical History:   Diagnosis Date    ANGEL (acute kidney injury) (H24)     Alcoholic cirrhosis of liver without ascites (H) 07/13/2023    Alcoholic hepatitis with ascites (H28) 10/03/2023    Alcoholic hepatitis without ascites (H28) 07/13/2023    Closed fracture of one rib of left side 09/14/2023    Concussion without loss of consciousness 03/11/2020    Decompensated hepatic cirrhosis (H) 09/15/2023    Diabetes mellitus, type 2 (H) 11/22/2023    Essential hypertension  03/11/2020    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)     Mild hyperlipidemia 12/07/2021    Persistent insomnia 07/13/2023    Portal hypertension (H) 07/13/2023    Scrotal abscess     Secondary esophageal 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 (H) 07/13/2023    Tobacco abuse disorder 03/11/2020    Type 2 diabetes mellitus with hyperglycemia (H) 12/22/2023     Past Surgical History:   Procedure Laterality Date    CHOLECYSTECTOMY      COLONOSCOPY N/A 1/2/2024    Procedure: COLONOSCOPY, WITH POLYPECTOMY;  Surgeon: Jak Urbina MD;  Location: [facility]    CV RIGHT HEART CATH MEASUREMENTS RECORDED N/A 1/30/2024    Procedure: Right Heart Catheterization;  Surgeon: Alfred Tafoya MD;  [facility]: [facility]    TONSILLECTOMY      VASECTOMY       Social History     Tobacco Use    Smoking status: Former     Types: Cigarettes     Passive exposure: Never    Smokeless tobacco: Current     Types: Chew     Last attempt to quit: 1/1/2004    Tobacco comments:     Chew daily 1/3 of a tin per day   Vaping Use    Vaping Use: Never used   Substance Use Topics    Alcohol use: Not Currently     Alcohol/week: 12.0 standard drinks of alcohol     Types: 12 Standard drinks or equivalent per week     Comment: Sober since 6/25/2023    Drug use: Not Currently          Current Medications & Allergies:      cefTRIAXone  2 g Intravenous Q24H    folic acid  1 mg Oral Daily    hydrocortisone sodium succinate PF  50 mg Intravenous Q12H    insulin aspart  8 Units Subcutaneous TID w/meals    insulin aspart  1-10 Units Subcutaneous TID AC    insulin aspart  1-7 Units Subcutaneous At Bedtime    insulin glargine  20 Units Subcutaneous BID    lactulose  20 g Oral TID    melatonin  6 mg Oral At Bedtime    midodrine  10 mg Oral Q8H    pantoprazole  40 mg Oral BID AC    rifaximin  550 mg Oral BID    thiamine  250 mg Intravenous Daily    Followed by    [START ON 3/2/2024] thiamine  100 mg Oral Daily     Infusions/Drips:     dextrose       insulin regular Stopped (02/26/24 0930)     Allergies   Allergen Reactions    Food Anaphylaxis     baked beans    Pineapple Itching          Physical Exam:   Patient Vitals for the past 24 hrs:   BP Temp Temp src Pulse Resp SpO2 Weight   02/26/24 1400 108/65 -- -- 85 16 96 % --   02/26/24 1345 98/54 -- -- 83 14 98 % --   02/26/24 1330 112/54 -- -- 81 12 94 % --   02/26/24 1315 103/53 -- -- 78 14 97 % --   02/26/24 1300 108/56 -- -- 80 12 97 % --   02/26/24 1245 112/56 -- -- 78 12 97 % --   02/26/24 1230 109/56 -- -- 81 12 99 % --   02/26/24 1215 110/56 -- -- 84 12 98 % --   02/26/24 1200 115/61 97.6  F (36.4  C) Oral 85 12 98 % --   02/26/24 1145 107/58 -- -- 80 11 100 % --   02/26/24 1115 -- -- -- 86 12 -- --   02/26/24 1100 100/55 -- -- 79 10 99 % --   02/26/24 1045 95/57 -- -- 80 14 97 % --   02/26/24 1030 97/58 -- -- 79 17 100 % --   02/26/24 1015 104/63 -- -- 79 12 100 % --   02/26/24 1000 101/61 -- -- 79 10 99 % --   02/26/24 0945 102/57 -- -- 75 10 98 % --   02/26/24 0930 102/56 -- -- 77 14 100 % --   02/26/24 0915 111/68 -- -- 83 16 98 % --   02/26/24 0900 96/63 -- -- 77 12 93 % --   02/26/24 0845 101/54 -- -- 76 15 97 % --   02/26/24 0830 96/55 -- -- 74 10 100 % --   02/26/24 0815 102/57 -- -- 75 11 99 % --   02/26/24 0800 106/65 97.7  F (36.5  C) Oral 78 10 98 % --   02/26/24 0745 106/62 -- -- 84 13 100 % --   02/26/24 0740 101/69 -- -- 93 23 94 % --   02/26/24 0730 -- -- -- 86 15 96 % --   02/26/24 0715 92/49 -- -- 69 12 93 % --   02/26/24 0700 92/50 -- -- 70 12 92 % --   02/26/24 0630 99/55 -- -- 72 11 94 % --   02/26/24 0615 97/53 -- -- 73 10 90 % --   02/26/24 0600 92/52 -- -- 73 10 92 % --   02/26/24 0545 95/54 -- -- 72 (!) 9 94 % --   02/26/24 0530 94/53 -- -- 71 13 95 % --   02/26/24 0515 99/55 -- -- 72 11 95 % --   02/26/24 0500 101/54 -- -- 71 12 95 % --   02/26/24 0445 97/55 -- -- 71 11 94 % --   02/26/24 0430 102/50 -- -- 72 15 94 % --   02/26/24 0415 100/56 -- -- 74 12 94 % --   02/26/24 0400  100/59 97.6  F (36.4  C) Oral 78 13 95 % --   02/26/24 0345 101/58 -- -- 75 11 94 % --   02/26/24 0330 97/55 -- -- 73 12 93 % --   02/26/24 0315 99/53 -- -- 72 10 93 % --   02/26/24 0300 98/52 -- -- 73 12 93 % --   02/26/24 0245 104/53 -- -- 75 (!) 9 93 % --   02/26/24 0230 104/56 -- -- 75 (!) 9 94 % --   02/26/24 0215 109/60 -- -- 78 12 92 % --   02/26/24 0200 107/59 -- -- 75 (!) 9 91 % --   02/26/24 0145 106/55 -- -- 73 (!) 9 92 % --   02/26/24 0130 101/58 -- -- 76 10 93 % --   02/26/24 0115 106/57 -- -- 74 (!) 9 93 % --   02/26/24 0100 105/54 -- -- 74 10 93 % --   02/26/24 0045 105/55 -- -- 74 10 93 % --   02/26/24 0030 105/57 -- -- 73 10 93 % --   02/26/24 0015 104/58 -- -- 71 11 94 % --   02/26/24 0000 91/50 97.8  F (36.6  C) Oral 70 12 92 % 109.8 kg (242 lb 1 oz)   02/25/24 2345 90/51 -- -- 69 (!) 9 92 % --   02/25/24 2330 (!) 87/43 -- -- 68 (!) 8 93 % --   02/25/24 2315 (!) 86/48 -- -- 70 (!) 8 94 % --   02/25/24 2300 (!) 85/51 -- -- 71 (!) 9 94 % --   02/25/24 2245 90/53 -- -- 73 13 97 % --   02/25/24 2230 (!) 87/53 -- -- 72 10 96 % --   02/25/24 2215 (!) 85/51 -- -- 73 (!) 9 96 % --   02/25/24 2200 90/51 -- -- 77 10 98 % --   02/25/24 2145 100/53 -- -- 83 14 99 % --   02/25/24 2115 107/65 -- -- 82 10 95 % --   02/25/24 2100 104/60 -- -- 81 11 97 % --   02/25/24 2045 108/61 -- -- 81 11 99 % --   02/25/24 2030 104/61 -- -- 80 10 97 % --   02/25/24 2015 102/61 -- -- 81 10 97 % --   02/25/24 2000 99/61 98.9  F (37.2  C) Oral 84 16 97 % --   02/25/24 1945 100/61 -- -- 84 12 100 % --   02/25/24 1930 (!) 88/52 -- -- 75 11 97 % --   02/25/24 1915 (!) 86/53 -- -- 75 10 97 % --   02/25/24 1900 (!) 84/50 -- -- 76 12 96 % --   02/25/24 1845 (!) 86/53 -- -- 79 12 98 % --   02/25/24 1830 (!) 85/52 -- -- 81 10 98 % --   02/25/24 1815 91/52 -- -- 85 12 97 % --   02/25/24 1800 91/53 -- -- 85 13 98 % --   02/25/24 1745 90/55 -- -- 89 14 98 % --   02/25/24 1730 97/58 -- -- 87 14 99 % --   02/25/24 1715 106/58 -- -- 88 18 99 %  "--   02/25/24 1700 94/70 -- -- 83 16 99 % --   02/25/24 1645 92/51 -- -- 80 12 99 % --   02/25/24 1630 92/52 -- -- 81 15 98 % --   02/25/24 1615 (!) 87/53 -- -- 82 17 99 % --   02/25/24 1600 (!) 85/52 97.7  F (36.5  C) Oral 82 16 100 % --   02/25/24 1545 92/55 -- -- 80 12 99 % --   02/25/24 1530 97/54 -- -- 82 14 98 % --   02/25/24 1515 94/54 -- -- 81 12 99 % --     Ranges for vital signs over the past 24 hours:   Temp:  [97.6  F (36.4  C)-98.9  F (37.2  C)] 97.6  F (36.4  C)  Pulse:  [68-93] 85  Resp:  [8-23] 16  BP: ()/(43-70) 108/65  SpO2:  [90 %-100 %] 96 %  Vitals:    02/24/24 0400 02/25/24 0400 02/26/24 0000   Weight: 106.2 kg (234 lb 2.1 oz) 106.8 kg (235 lb 7.2 oz) 109.8 kg (242 lb 1 oz)     Intake/Output Summary (Last 24 hours) at 2/26/2024 1503  Last data filed at 2/26/2024 1300  Gross per 24 hour   Intake 1135.26 ml   Output 1450 ml   Net -314.74 ml     Physical Examination:  GENERAL:  well-developed, well-nourished, in bed in no acute distress.  HEAD:  NCAT.  EYES:  EOMI, icteric sclerae  ENT:  Oropharynx is moist without exudates or ulcers. Tongue is midline  NECK:  Supple. No cervical lymphadenopathy. Right internal jugular CVC line site lacks inflammation.  LUNGS:  Clear to auscultation bilateral.   CARDIOVASCULAR:  Regular rate and rhythm with no murmur  ABDOMEN:  Normal bowel sounds, soft, nontender.   SKIN:  No acute rashes. Line in place without any surrounding erythema or exudate.  NEUROLOGIC:  Grossly nonfocal. Moves extremities x 4.         Laboratory Data:     No results found for: \"ACD4\", \"HIVPCR\"    Inflammatory Markers    Recent Labs   Lab Test 12/19/23  0758   PSA 0.07       Immune Globulin Studies     Recent Labs   Lab Test 11/28/23  1013 09/13/23  1028   IGG 2,499*  2,499* 2,966*   IGA  --  861*   IGG1 1,523*  --    IGG2 734*  --    IGG3 199*  --    IGG4 131*  --        Metabolic Studies       Recent Labs   Lab Test 02/26/24  1445 02/26/24  0433 02/26/24  0343 02/25/24  0441 " 02/25/24  0440 02/25/24  0126 02/25/24  0122 02/24/24  0551 02/24/24  0400 02/23/24 2008 02/23/24  1902 02/23/24  0931 02/23/24  0928   NA  --   --  130*  --  127*  --   --    < > 124*  --   --   --  123*   POTASSIUM  --   --  3.8  --  4.5  --   --    < > 5.7*  --   --   --  5.5*   CHLORIDE  --   --  100  --  97*  --   --    < > 94*  --   --   --  91*   CO2  --   --  22  --  21*  --   --    < > 21*  --   --   --  21*   ANIONGAP  --   --  8  --  9  --   --    < > 9  --   --   --  11   BUN  --   --  41.6*  --  43.2*  --   --    < > 42.5*  --   --   --  35.5*   CR  --   --  1.48*  --  1.64*  --   --    < > 2.16*  --   --   --  2.51*   GFRESTIMATED  --   --  57*  --  50*  --   --    < > 36*  --   --   --  30*   *   < > 130*   < > 334*   < >  --    < > 355*   < >  --    < > 141*   A1C  --   --   --   --   --   --  4.8  --   --   --   --   --   --    MEG  --   --  8.6  --  8.2*  --   --    < > 7.8*  --   --   --  8.2*   PHOS  --   --   --   --  4.4  --   --    < > 4.7*  --   --   --   --    MAG  --   --   --   --  1.9  --   --    < > 1.5*  --   --   --   --    LACT  --   --   --   --   --   --   --   --  2.6*  --  3.1*   < > 4.5*   PCAL  --   --   --   --   --   --   --   --   --   --   --   --  1.97*    < > = values in this interval not displayed.       Hepatic Studies    Recent Labs   Lab Test 02/26/24  0945 02/26/24  0343 02/25/24  0440 02/24/24  0400 02/23/24  0928 09/05/23  1242 08/17/23  1112   BILITOTAL  --  8.4* 9.3*   < > 11.1*   < > 6.3*   DBIL  --   --   --   --  6.66*   < > 3.52*   ALKPHOS  --  190* 170*   < > 191*   < > 270*   PROTTOTAL  --  5.9* 6.0*   < > 6.3*   < > 7.3   ALBUMIN  --  2.4* 2.5*   < > 2.4*   < > 2.1*   AST  --  92* 63*   < > 56*   < > 90*   ALT  --  36 23   < > 25   < > 42   LDH  --   --   --   --   --   --  277*   JAQUELINE 46  --   --   --  64*   < >  --     < > = values in this interval not displayed.       Pancreatitis testing    Recent Labs   Lab Test 02/15/24  2049 03/12/22  1006    AMYLASE 28  --    TRIG  --  151*       Gout Labs    No lab results found.    Hematology Studies   Recent Labs   Lab Test 02/26/24  0343 02/25/24  0122 02/24/24  1740 02/24/24  1013 02/24/24  0400 02/23/24 2013 02/23/24  0928 02/18/24  1202 09/19/23  1653 09/13/23  1028 08/29/23  1018 08/23/23  1018   WBC 22.4* 20.1* 17.4*  --  37.1*   < > 28.6* 16.4*   < > 14.7*   < > 16.5*   ANEU  --   --   --   --   --   --   --   --   --  9.7*  --  13.5*   ANEUTAUTO  --   --   --   --   --   --  24.8* 13.1*   < >  --   --   --    ALYM  --   --   --   --   --   --   --   --   --  2.5  --  1.3   ALYMPAUTO  --   --   --   --   --   --  0.8 1.0   < >  --   --   --    BENOIT  --   --   --   --   --   --   --   --   --  1.5*  --  0.2   AMONOAUTO  --   --   --   --   --   --  1.5* 1.5*   < >  --   --   --    AEOS  --   --   --   --   --   --   --   --   --  0.4  --  0.3   AEOSAUTO  --   --   --   --   --   --  0.2 0.4   < >  --   --   --    ABSBASO  --   --   --   --   --   --  0.1 0.1   < >  --   --   --    HGB 7.6* 7.9* 6.8*   < > 7.5*   < > 6.6* 7.2*   < > 9.2*   < > 8.0*   HCT 22.2* 22.9* 19.8*  --  21.7*   < > 20.4* 22.2*   < > 27.1*   < > 23.4*   PLT 58* 66* 45*  --  110*   < > 95* 85*   < > 80*   < > 111*    < > = values in this interval not displayed.       Clotting Studies    Recent Labs   Lab Test 02/26/24  0343 02/25/24  0852 02/24/24  0831 02/23/24  1029   INR 3.04* 2.78* 4.83* 4.89*   PTT  --   --  67*  --        Iron Testing    Recent Labs   Lab Test 02/26/24  0343 02/15/24  2034 02/13/24  1615 12/26/23  0736 12/19/23  0758 08/29/23  1018 08/23/23  1018 08/17/23  1112 08/10/23  1107 08/09/23  1122 07/25/23  1702 07/15/23  1048   IRON  --   --   --   --  135  --  120  --   --  118  --  92   KE  --   --   --   --  2,948*  --  4,261*  --   --  4,611*   < >  --    MCV 95   < > 104*   < > 98   < > 98 99   < > 98   < > 111*   FOLIC  --   --   --   --   --   --  13.4  --   --   --   --   --    B12  --   --   --   --   --   --    "--   --   --   --   --  2,662*   HAPT  --   --   --   --   --   --   --  <3*  --   --   --   --    RETP  --   --  6.8*  --   --   --  5.7* 5.5*   < >  --   --   --    RETICABSCT  --   --  0.157*  --   --   --  0.137* 0.120*   < >  --   --   --     < > = values in this interval not displayed.       Markers  No lab results found.    Invalid input(s): \"FETOPROTEIN\", \"SERUM\", \"AFP\"    Autoimmune Testing  No lab results found.    Invalid input(s): \"PRO3\", \"C3\", \"C4\", \"VASPAN\"    Arterial Blood Gas Testing    Recent Labs   Lab Test 02/23/24  0928   O2PER 23        Thyroid Studies     Recent Labs   Lab Test 03/12/22  1006   TSH 2.18       Urine Studies     Recent Labs   Lab Test 02/23/24  1558 02/15/24  2015 11/09/23  1147 09/05/23  1242   URINEPH 5.0 5.0 6.0 5.5   NITRITE Negative Negative Negative Negative   LEUKEST Negative Negative Negative Trace*   WBCU <1 <1 0-5 0-5       Medication levels    Recent Labs   Lab Test 02/24/24  1013   VANCOMYCIN 9.7       CSF testing   No lab results found.    Invalid input(s): \"CADAM\", \"EVPCR\", \"ENTPCR\", \"ENTEROVIRUS\"    Body fluid stats  No lab results found.    Microbiology:  Fungal testing  No lab results found.    Invalid input(s): \"HIFUN\", \"FUNGL\"    Last Culture results   Culture   Date Value Ref Range Status   02/25/2024 No growth after 1 day  Preliminary   02/25/2024 No growth after 1 day  Preliminary   02/24/2024 No growth after 1 day  Preliminary   02/24/2024 No growth after 1 day  Preliminary   02/23/2024 No growth after 3 days  Preliminary   02/23/2024 Positive on the 1st day of incubation (A)  Final   02/23/2024 (AA)  Final    Streptococcus gallolyticus (Streptococcus bovis group)     Comment:     2 of 2 bottles   02/15/2024   Final    No Vancomycin Resistant Enterococcus species isolated   02/15/2024 <10,000 CFU/mL Mixture of Urogenital Rachel  Final         Last checks of Clostridioides difficile testing  No lab results found.    Syphilis Testing    Treponema Antibody " "Total   Date Value Ref Range Status   12/19/2023 Nonreactive Nonreactive Final       Quantiferon testing   Recent Labs   Lab Test 02/23/24  0928 02/18/24  1202 01/18/24  0807 01/09/24  1622 12/19/23  0758 12/19/23  0755   TBRES  --   --   --  Indeterminate*  --  Indeterminate*   LYMPH 3 6   < > 15   < >  --     < > = values in this interval not displayed.       Infection Studies to assess Diarrhea  No lab results found.    Invalid input(s): \"BIDYD\"    Virology:  Coronavirus-19 testing    Recent Labs   Lab Test 02/23/24  0928 02/15/24  2017   [id number] Negative Positive*   CYCLETHRES  --  43.6       Respiratory virus (non-coronavirus-19) testing    Recent Labs   Lab Test 02/23/24 0928   INFZA Negative   INFZB Negative   IRSV Negative       CMV viral loads  No lab results found.    CMV resistance testing  No lab results found.    No results found for: \"H6RES\"    No results found for: \"EBVRESINST\", \"98745\", \"EBQTC\", \"EBRES\", \"31304\", \"03660\"    No results found for: \"77681\", \"BKRES\"    Parvovirus Testing  No lab results found.    Invalid input(s): \"PRVRES\"    Adenovirus Testing  No lab results found.    Invalid input(s): \"ADENAB\", \"ADENOVIRUS\", \"ADQT\"    Hepatitis B Testing     Recent Labs   Lab Test 02/15/24  2034 08/17/23  1112   AUSAB <3.50 2.19   HBCAB Nonreactive  --    HEPBANG Nonreactive  --         Hepatitis C Antibody   Date Value Ref Range Status   02/15/2024 Nonreactive Nonreactive Final     Comment:     A nonreactive screening test result does not exclude the possibility of exposure to or infection with HCV. Nonreactive screening test results in individuals with prior exposure to HCV may be due to antibody levels below the limit of detection of this assay or lack of reactivity to the HCV antigens used in this assay. Patients with recent HCV infections (<3 months from time of exposure) may have false-negative HCV antibody results due to the time needed for seroconversion (average of 8 to 9 weeks). "       CMV Antibody IgG   Date Value Ref Range Status   02/15/2024 No detectable antibody. No detectable antibody.  Final   12/19/2023 No detectable antibody. No detectable antibody.  Final     CMV Antibody IgM   Date Value Ref Range Status   02/15/2024 Negative Negative Final     EBV Capsid Antibody IgG   Date Value Ref Range Status   02/15/2024 Positive (A) No detectable antibody. Final     Comment:     Suggests recent or past exposure.   12/19/2023 Positive (A) No detectable antibody. Final     Comment:     Suggests recent or past exposure.     EBV Capsid Antibody IgM   Date Value Ref Range Status   02/15/2024 No detectable antibody. No detectable antibody. Final       Imaging:  No results found for this or any previous visit (from the past 48 hour(s)).

## 2024-02-26 NOTE — PROGRESS NOTES
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 Initial blood cultures with strep bovis.       RECOMMENDATIONS:    Updates for 02/26/2024 :  - continue abx.   - wean pressors    #Strep bovis bacteremia  - Blood cultures with strep bovis 2/2 bottles on 2/24. Started on abx 2/23 with cefepime. Now on ceftriaxone. ID following. Follow up cultures negative.   - TTE 2/23 does not have evidence of vegetation. Will discuss with ID if ANEL is warranted.   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 Listed, ON HOLD, due to current infection. Did have recent COVID + (2/15), on admit was negative. Will discuss timing of reactivation at tx conference 2/27.   - US abd 12/14 without HCC    # Hepatic encephalopathy  - mentation improved. Continue lactulose and rifaximin, titrate for ~3 BM's per day.     # Ascites  - small ascites. No safe pocket for para.   - diuretics on hold due to ANGEL, continue to monitor.     # EV  - EGD with gr I EV and GOV2 on 7/26/23. No current sign of GIB.    # Debility  - continue PT/OT for strengthening.     Patient was discussed with attending physician, Dr. Liana Roldan.  The above note reflects our joint plan.     Next follow up appointment: Dr. Muniz 4/16/24    Thank you for the opportunity to be involved with  Mary Lopez care. Please call with any questions or  "concerns.    LESA Rosales, CNP  Inpatient Hepatology [facility]          Subjective    Reports he was able to be up walking in pace and denies fevers, nausea, vomiting. He does have varying levels of appetite, was able to eat this morning. Lower extremity edema improved.         Objective    VS: /56   Pulse 84   Temp 97.7  F (36.5  C) (Oral)   Resp 12   Ht 1.727 m (5' 8\")   Wt 109.8 kg (242 lb 1 oz)   SpO2 98%   BMI 36.81 kg/m     Date 02/26/24 0700 - 02/27/24 0659   Shift 8361-0025 3999-3327 [id number] 24 Hour Total   INTAKE   P.O. 420   420   I.V. 41.53   41.53   Shift Total(mL/kg) 461.53(4.2)   461.53(4.2)   OUTPUT   Urine 350   350   Other 500   500   Shift Total(mL/kg) 850(7.74)   850(7.74)   Weight (kg) 109.8 109.8 109.8 109.8      General: In no acute distress, mild facial muscle wasting  Neuro: AOx3, No asterixis, mild word finding difficulty  HEENT:  Noscleral icterus, Nooral lesions  CV: . Skin warm and dry  Lungs:  Respirations even and nonlabored on room air  Abd:  Nontender, nondistended   Extrem:  +1 lower  peripheral edema   Skin:  mild jaundice  Psych: pleasant    MEDICATIONS:  Current Facility-Administered Medications   Medication    cefTRIAXone (ROCEPHIN) 2 g vial to attach to  ml bag for ADULTS or NS 50 ml bag for PEDS    dextrose 10% infusion    glucose gel 15-30 g    Or    dextrose 50 % injection 25-50 mL    Or    glucagon injection 1 mg    glucose gel 15-30 g    Or    dextrose 50 % injection 25-50 mL    Or    glucagon injection 1 mg    diclofenac (VOLTAREN) 1 % topical gel 4 g    folic acid (FOLVITE) tablet 1 mg    hydrocortisone sodium succinate PF (solu-CORTEF) injection 50 mg    insulin aspart (NovoLOG) injection (RAPID ACTING)    insulin aspart (NovoLOG) injection (RAPID ACTING)    insulin glargine (LANTUS PEN) injection 15 Units    insulin regular (MYXREDLIN) 1 unit/mL infusion    lactulose (CHRONULAC) solution 20 g    melatonin tablet 6 mg    midodrine (PROAMATINE) " tablet 10 mg    norepinephrine (LEVOPHED) 16 mg in  mL infusion MAX CONC CENTRAL LINE    ondansetron (ZOFRAN ODT) ODT tab 4 mg    Or    ondansetron (ZOFRAN) injection 4 mg    pantoprazole (PROTONIX) EC tablet 40 mg    rifaximin (XIFAXAN) tablet 550 mg    sodium chloride 0.9% BOLUS 1-250 mL    thiamine (B-1) injection 250 mg    Followed by    [START ON 3/2/2024] thiamine (B-1) tablet 100 mg       REVIEW OF LABORATORY, PATHOLOGY AND IMAGING RESULTS:  BMP  Recent Labs   Lab 02/26/24  0939 02/26/24  0832 02/26/24  0749 02/26/24  0629 02/26/24  0433 02/26/24  0343 02/25/24  0441 02/25/24  0440 02/24/24  1742 02/24/24  1448 02/24/24  0551 02/24/24  0400   NA  --   --   --   --   --  130*  --  127*  --  126*  --  124*   POTASSIUM  --   --   --   --   --  3.8  --  4.5  --  4.2  --  5.7*   CHLORIDE  --   --   --   --   --  100  --  97*  --  96*  --  94*   MEG  --   --   --   --   --  8.6  --  8.2*  --  8.0*  --  7.8*   CO2  --   --   --   --   --  22  --  21*  --  21*  --  21*   BUN  --   --   --   --   --  41.6*  --  43.2*  --  43.5*  --  42.5*   CR  --   --   --   --   --  1.48*  --  1.64*  --  1.88*  --  2.16*   * 105* 104* 122*   < > 130*   < > 334*   < > 241*   < > 355*    < > = values in this interval not displayed.     CBC  Recent Labs   Lab 02/26/24  0343 02/25/24  0122 02/24/24  1740 02/24/24  1013 02/24/24  0400   WBC 22.4* 20.1* 17.4*  --  37.1*   RBC 2.33* 2.42* 2.05*  --  2.17*   HGB 7.6* 7.9* 6.8*   < > 7.5*   HCT 22.2* 22.9* 19.8*  --  21.7*   MCV 95 95 97  --  100   MCH 32.6 32.6 33.2*  --  34.6*   MCHC 34.2 34.5 34.3  --  34.6   RDW 16.6* 16.2* 15.6*  --  15.6*   PLT 58* 66* 45*  --  110*    < > = values in this interval not displayed.     INR  Recent Labs   Lab 02/26/24  0343 02/25/24  0852 02/24/24  0831 02/23/24  1029   INR 3.04* 2.78* 4.83* 4.89*     LFTs  Recent Labs   Lab 02/26/24  0343 02/25/24  0440 02/24/24  0400 02/23/24  0928   ALKPHOS 190* 170* 207* 191*   AST 92* 63* 59* 56*   ALT  36    BILITOTAL 8.4* 9.3* 11.7* 11.1*   PROTTOTAL 5.9* 6.0* 6.3* 6.3*   ALBUMIN 2.4* 2.5* 2.4* 2.4*        MELD 3.0: 34 at 2024  3:43 AM  MELD-Na: 33 at 2024  3:43 AM  Calculated from:  Serum Creatinine: 1.48 mg/dL at 2024  3:43 AM  Serum Sodium: 130 mmol/L at 2024  3:43 AM  Total Bilirubin: 8.4 mg/dL at 2024  3:43 AM  Serum Albumin: 2.4 g/dL at 2024  3:43 AM  INR(ratio): 3.04 at 2024  3:43 AM  Age at listin years  Sex: Male at 2024  3:43 AM    Previous work-up:   Lab Results   Component Value Date    HEPBANG Nonreactive 02/15/2024    HBCAB Nonreactive 02/15/2024    AUSAB <3.50 02/15/2024    HCVAB Nonreactive 02/15/2024    KE 2,948 (H) 2023    IRONSAT  2023      Comment:      Unable to calculate:  UIBC or Iron value is outside detectable level.    TTG 2.2 2023    TTGG 2.0 2023     (H) 2023    GEORGE Negative 2023    SMOOTHMUS 70 (H) 2023    G4ZXBIO Whole Blood 2023    A1A 97 2023    Y7TGFMU Negative 2023    T1PRDUK Heterozygous (A) 2023    Z1MMAHYW Not Applicable 2023    J0FNHWCCJ See Note 2023    MITM2 1.2 2023    TSH 2.18 2022    CHOL 203 (H) 2022    HDL 71 2022     (H) 2022    TRIG 151 (H) 2022    A1C 4.8 2024    POPETH <10 2024    PLPETH <10 2024      Lab Results   Component Value Date    SPECDES  2023     Blood: ACD      LDRESULTS  2023     HEMOCHROMATOSIS RESULTS    HFE Gene C282Y (G845A) RESULTS:    C282Y Mutation Interpretation: HETEROZYGOTE    HFE Gene H63D (C187G) RESULTS:    H63D Mutation Interpretation: NORMAL    HFE Gene S65C (A193T) RESULTS:    S65C Mutation Interpretation: NORMAL           Imaging Results:  None current

## 2024-02-26 NOTE — PROGRESS NOTES
[facility]    Progress Note - Medicine Service, MARO[facility] TEAM 1       Date of Admission:  2/23/2024    Assessment & Plan   Mary Lopez is a 53 year old male admitted on 2/23/2024 with a PMH of alcoholic cirrhosis (sober since 6/25/2023) c/b ascites, hepatic encephalopathy, grade 1 esophageal varices, transplant candidate (although inactive on list due to acute illness), and T2DM who was admitted to the ICU after presenting to the ED febrile, hypotensive, and encephalopathic. Found to have strep bovis bacteremia likely 2/2 to SBP/GI source and septic shock requiring minimal pressors.     #Streptococcus galloylyticus (bovis) bacteremia likely 2/2 GI/SBP source (gut translocation)  #Leukocytosis  #Septic Shock, resolved  Patient presented to the ED on 2/23/2024 with fevers, tachycardia, hypotension, leukocytosis, and encephalopathy. Patient given fluids, but eventually started on levophed for concerns of septic shock. Blood culture grew streptococcus galloylyticus (bovis). MRSA swab and UA negative. Source suspected to be 2/2 SBP or GI source with translocation. Patient with history of ascites and suspected SBP with last hospitalization. Unable to complete paracentesis on this admission, due to pocket being too small to complete procedure. Echo from 2/24/24 showed no concern for vegetation. Colonoscopy from 1/2/24 did not show any signs of malignancy. Patient weaned off of levophed in the ED, started on midodrine and stress dose steroids. Steroids have been slowly weaned. Blood pressure has remained stable. Does have increased leukocytosis, but notably on high dose stress steroids that are being weaned.   -Transplant ID following, appreciate recs   > continue ceftriaxone 2 g Q24H through 3/8/2024 to complete 14 day course (from 2/24/24)   > From ID prospective, can be listed for liver transplant at any point   > ANEL is not needed (given rapid bacteremia  clearance)   > monitor CBC   > would recommend first dose of TwinRix now  - continue midodrine 10 mg Q8H  - continue hydrocortisone 50 mg daily on 2/27, then discontinue (finishing wean)  - MAP goal >65     Antimicrobials:  -Cefepime (02/23-02/25)  -Vancomycin (02/23-02/25)     Cultures:  - 2/26 Bcx: pending   - 2/25 Bcx: NGTD  - 2/24 Bcx: NGTD  - 2/23 Bcx 2/2 bottles: Streptococcus gallolyticus (Streptococcus bovis group)  - 2/23 verigene: no organism detected      #Decompensated cirrhosis 2/2 Alcohol Cirrhosis MELD 3.0-34   #Alpha-1 antitrypsin MZ heterozygote, C282Y heterozygote  #Hepatic Encephalopathy  #Grade I Esophageal Varices, no Hx of variceal bleed  Patient initially presented on 2/15 for liver transplant, but found to be COVID+ and donor liver transplant was ineligible. Most recent EGD in July 2023 found to have G1 EV/GOV Type1 with no hx of bleeding. Patient with history of suspected SBP in last admission (1/2024), but did not have pocket to complete paracentesis. Patient currently on PTA lactulose and rifaximin for hepatic encephalopathy. Patient liver transplant candidate, currently inactive due to acute illness but per ID patient would be able to eligible once again  now that bacteremia has improved. Patient given albumin on 2/24 per GI recs. Patient with noted history of hepatic encephalopathy, but patient's mental status seems to be at baseline now s/p antibiotics.   - Hepatology Consulted, appreciate recs  - continue pantoprazole 40 mg BID  - continue PTA Lactulose 20 g TID (goal of 3-4 BM per day)  - continue rifaximin 550mg BID  - continue PTA folate and high dose thiamine  - MELD labs daily    MELD 3.0: 34 at 2/26/2024  3:43 AM  MELD-Na: 33 at 2/26/2024  3:43 AM  Calculated from:  Serum Creatinine: 1.48 mg/dL at 2/26/2024  3:43 AM  Serum Sodium: 130 mmol/L at 2/26/2024  3:43 AM  Total Bilirubin: 8.4 mg/dL at 2/26/2024  3:43 AM  Serum Albumin: 2.4 g/dL at 2/26/2024  3:43 AM  INR(ratio): 3.04 at  2024  3:43 AM  Age at listin years  Sex: Male at 2024  3:43 AM    #Acute on Chronic Metabolic Encephalopathy (HE vs Sepsis), improving  #c/f delirium  Patient with decompensated cirrhosis complicated by history of hepatic encephalopathy, who presented with altered mental status. Likely compounded by sepsis and elevated ammonium levels. Ammonia levels decreased to 46 on  from 64 on . Today, patient's mentation seems to be at baseline per wife since . Additional concerns for delirium, as it appears that patient's confusion is worse at night.   - delirium precautions  - continue PTA lactulose and rifaximin  - continue melatonin 6 mg at bedtime  - hold PTA doxepin 10 mg daily, but will consider restarting if blood pressures remain stable    #Acute Kidney Injury, likely shock/prerenal  Creatinine elevated to 2.5 upon admission (baseline 0.6-1.2). Upon transfer to medicine floor, improved to 1.48. Etiology thought likely due to pre-renal cause with hypotension/hypovolemia with septic shock. Received 2L NS in ED and albumin in ICU. Additionally, sodium low at 130, but has consistently been low throughout admission and improved from 123 upon admission.   - CMP daily  - holding PTA spironolactone 25 mg daily and torsemide 30 mg daily  - daily weights  - I&Os  - encourage oral intake    #Type 2 DM  #Concerning for hypoglycemic episode  #Steroid induced hyperglycemia  Prior to admission, patient was taking 36 units Tresiba and 15 units Humalog w/breakfast and lunch. 13 units of dinner. Placed on insulin drip while in ICU due to acute illness and elevated blood sugars. Transitioned off insulin drip to glargine and aspart for meal time insulin. Per ICU team, concerns that patient was being given excess insulin at home with hypoglycemic episode, diabetes education consulted while patient here. Will continue to monitor blood sugars and determine further dosing of insulin.   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 7.6 upon transfer to floor team. Notes of hematoma noted at RIJ site on 2/24. Hematoma mildly tender to touch, but hemoglobin has remained stable and patient otherwise hemodynamically stable. INR elevated to 4.8, fibrogen <60. Most likely in setting of end stage liver disease. Important to r/o DIC so factor 8 assay is obtained, did not indicate DIC.Teg results consistent hypofibrinogenemia and thrombocytopenia vs platelet dysfunction. Has received various blood products to maintain lab values (Vitamin K x2, 3 units pRBC, 2 units cryo, 1 unit platelets).  - Transfuse to keep hemoglobin >7, platelet >20K, and fibrinogen >100K   - CBC daily  - continue to monitor hematoma for worsening bleeding, removing RIJ today so will ensure stability     #Severe protein malnutrition  #Hypoalbuminemia  Per sign-out, patient with concerns of low nutritional intake. Will continue to monitor patient's progress and consult Nutrition if needed.   - will revisit Nutrition consult  - consistent carb diet, 2 gm sodium    #Alcohol Use Dependence, in remission  Sober since 06/25/2023.      #Hyperkalemia, resolved  K+ elevated to 5.7 (02/24) improved to 3.8 (02/26) s/p lokelma x1.  -Trend BMP daily      #R shoulder tear-chronic  Pt had a fall in October and now has pain in shoulder. Was told not a candidate for surgery, recently started hydrocodone on 2/19.  - hold PTA hydrocodone   - Voltaren gel         Diet: Combination Diet Moderate Consistent Carb (60 g CHO per Meal) Diet; 2 gm NA Diet    DVT Prophylaxis: Pneumatic Compression Devices  Moore Catheter: Not present  Fluids:  "None  Lines: PRESENT      CVC Triple Lumen Right Internal jugular-Site Assessment: WDL except;Ecchymotic;Other (Comment) (hematoma present, unchanged)      Cardiac Monitoring: ACTIVE order. Indication: ICU  Code Status: Full Code      Clinically Significant Risk Factors         # Hyponatremia: Lowest Na = 127 mmol/L in last 2 days, will monitor as appropriate      # Hypoalbuminemia: Lowest albumin = 2.4 g/dL at 2/26/2024  3:43 AM, will monitor as appropriate  # Coagulation Defect: INR = 3.04 (Ref range: 0.85 - 1.15) and/or PTT = 67 Seconds (Ref range: 22 - 38 Seconds), will monitor for bleeding  # Thrombocytopenia: Lowest platelets = 45 in last 2 days, will monitor for bleeding  # Acute Kidney Injury, unspecified: based on a >150% or 0.3 mg/dL increase in last creatinine compared to past 90 day average, will monitor renal function  # Hypertension: Noted on problem list        # Obesity: Estimated body mass index is 36.81 kg/m  as calculated from the following:    Height as of this encounter: 1.727 m (5' 8\").    Weight as of this encounter: 109.8 kg (242 lb 1 oz)., PRESENT ON ADMISSION            Disposition Plan         The patient's care was discussed with the Attending Physician, Dr. Garcia .    TALA WILKINSON MD  Medicine Service, [address]  Securely message with [employer] (more info)  Text page via [facility] Paging/Directory   See signed in provider for up to date coverage information  ______________________________________________________________________    Interval History   Patient seen upon transfer to medicine team from ICU. Patient overall feels well and denies any specific concerns. He denied any fevers, chills, chest pain, abdominal pain, nausea/vomiting, dyspnea, confusion, dizziness, headaches, fatigue, or increased swelling in his bilateral lower extremities. He seems fairly clear and well and per wife, mentation has improved nearer to " baseline. Overall, looks well.     Physical Exam   Vital Signs: Temp: 97.6  F (36.4  C) Temp src: Oral BP: 105/62 Pulse: 76   Resp: 16 SpO2: 99 % O2 Device: None (Room air)    Weight: 242 lbs 1.04 oz    General: NAD, sitting comfortably in chair next to bed   HEENT: EOMI, PERRLA, atraumatic, scleral icterus noted, hematoma noted on RIJ site, slightly tender to touch   CV: RRR, no murmur noted  Lungs: clear to auscultation bilaterally, no wheezes or crackles noted, breathing non-labored on room air  Abdomen: soft, non-tender to palpation, non-distended, bowel sounds active  Neuro: alert and oriented to person, time. Not place, but noted to be baseline according to wife. Conversationally tangential, but he notes that he overall feels well. No asterixis or clonus.  No focal deficits noted  Skin: overall appears jaundiced  Extremity: 2+ pitting edema in bilateral lower extremities    Medical Decision Making       Please see A&P for additional details of medical decision making.      Data     I have personally reviewed the following data over the past 24 hrs:    22.4 (H)  \   7.6 (L)   / 58 (L)     130 (L) 100 41.6 (H) /  337 (H)   3.8 22 1.48 (H) \     ALT: 36 AST: 92 (H) AP: 190 (H) TBILI: 8.4 (H)   ALB: 2.4 (L) TOT PROTEIN: 5.9 (L) LIPASE: N/A     INR:  3.04 (H) PTT:  N/A   D-dimer:  N/A Fibrinogen:  N/A       Imaging results reviewed over the past 24 hrs:   No results found for this or any previous visit (from the past 24 hour(s)).

## 2024-02-26 NOTE — PROGRESS NOTES
"   02/26/24 8075   Appointment Info   Signing Clinician's Name / Credentials (PT) Shea Elias, PT, DPT   Living Environment   People in Home spouse   Current Living Arrangements   ([facility])   Home Accessibility stairs within home   Number of Stairs, Within Home, Primary seven   Stair Railings, Within Home, Primary railings safe and in good condition   Transportation Anticipated family or friend will provide   Living Environment Comments Pt lives with spouse in [facility]. No BENNIE from garage. Split-level entry. Bathroom/bedroom on lower level.   Self-Care   Usual Activity Tolerance fair   Current Activity Tolerance fair   Equipment Currently Used at Home none   Fall history within last six months yes   Number of times patient has fallen within last six months 2   Activity/Exercise/Self-Care Comment Since Sept 2023, spouse has been assisting with LB dressing due to swelling in LE & abdomen. drives.  Pt reports he spends his days sitting or lying down   General Information   Onset of Illness/Injury or Date of Surgery 02/23/24   Referring Physician Misha Olivarez MD   Patient/Family Therapy Goals Statement (PT) return home   Pertinent History of Current Problem (include personal factors and/or comorbidities that impact the POC) Per EMR: \"3 year old male with a history of EtOH cirrhosis (sober since 6/25/23) c/b ascites, hepatic encephalopathy and grade I esophageal varices, T2DM, transplant candidate (currently inactive on transplant list) with recent hospitalization (1/2024) for encephalopathy and suspected SBP (no safe window to complete para) presented to the ED on 2/23 febrile, hypotensive with AMS and encephalopathy, now with strep bovis bacteriemia admitted to MICU for septic shock requiring pressors.   Existing Precautions/Restrictions fall   Cognition   Cognitive Status Comments Intermittently required repeated cues due to distractibility. Reuqired extra time to remember his cat's name.   Integumentary/Edema "   Integumentary/Edema no deficits were identifed   Posture    Posture Protracted shoulders   Range of Motion (ROM)   Range of Motion ROM is WFL   Strength (Manual Muscle Testing)   Strength (Manual Muscle Testing) strength is WFL   Bed Mobility   Bed Mobility supine-sit   Supine-Sit [place] (Bed Mobility) minimum assist (75% patient effort)   Comment, (Bed Mobility) Slow speed, poor sequencing   Transfers   Transfers sit-stand transfer   Sit-Stand Transfer   Sit-Stand [place] (Transfers) contact guard   Comment, (Sit-Stand Transfer) Good initiation, vcs for hand placement   Gait/Stairs (Locomotion)   [place] Level (Gait) contact guard   Comment, (Gait/Stairs) Without walker, slow speed, short step length, unabel to walk forwrad and complete head turns   Balance   Balance Comments Unsupported sitting, SBA. Unable to make notable change in speed when cued.   Clinical Impression   Criteria for Skilled Therapeutic Intervention Yes, treatment indicated   PT Diagnosis (PT) impaired mobility   Influenced by the following impairments balance, activity tolerance   Functional limitations due to impairments bed mobility, gait   Clinical Presentation (PT Evaluation Complexity) stable   Clinical Presentation Rationale clincial judgement   Clinical Decision Making (Complexity) low complexity   Planned Therapy Interventions (PT) balance training;bed mobility training;gait training;home exercise program;neuromuscular re-education;stair training;strengthening;transfer training;progressive activity/exercise   Risk & Benefits of therapy have been explained evaluation/treatment results reviewed;care plan/treatment goals reviewed;risks/benefits reviewed;current/potential barriers reviewed;participants voiced agreement with care plan;participants included;patient;spouse/significant other   PT Total Evaluation Time   PT Eval, Low Complexity Minutes (13869) 10   Physical Therapy Goals   PT Frequency 5x/week   PT Predicted  Duration/Target Date for Goal Attainment 03/22/24   PT Goals Bed Mobility;Transfers;Gait;Stairs   PT: Bed Mobility Supervision/stand-by assist   PT: Transfers Supervision/stand-by assist   PT: Gait Supervision/stand-by assist;100 feet   PT: Stairs Supervision/stand-by assist;7 stairs   Interventions   Interventions Quick Adds Therapeutic Activity;Gait Training   Therapeutic Activity   Therapeutic Activities: dynamic activities to improve functional performance Minutes (05953) 10   Treatment Detail/Skilled Intervention STS from various surfaces SBA, Skilled monitoring of BP, soft but increased with activity and within parameters. Mobility goals established for acute setting including time in chair and walking frequency. Discussion held on discharge recs and reasoning with pt and spouse. Pt upright in chair at end of session, call light in reach, denied further needs, chair alarm activated.   Gait Training   Gait Training Minutes (56396) 9   Treatment Detail/Skilled Intervention Pt supine upon arrival, agreeable to therapy session, RN ok'd mobility. To progress gait, pt ambulated with and without AD. Without AD, faciltiated dynamic balance. With AD, fair walker management, continued slow speed, skilled monitoring of symptoms and tolerance.   Distance in Feet 90x2   [place] Level (Gait Training) contact guard   PT Discharge Planning   PT Plan Dynamic balance, trial stairs as appropriate   PT Discharge Recommendation (DC Rec) home with assist;home with home care physical therapy   PT Rationale for DC Rec Pt mobilizing with Ax1, pts spouse works from home and is able to assist. Recommend discharge home with assist and  PT to progress balance and activity tolerance   PT Brief overview of current status Ax1, walk in pace 3x.day   Total Session Time   Timed Code Treatment Minutes 19   Total Session Time (sum of timed and untimed services) 29

## 2024-02-27 ENCOUNTER — APPOINTMENT (OUTPATIENT)
Dept: OCCUPATIONAL THERAPY | Facility: CLINIC | Age: 54
DRG: 871 | End: 2024-02-27
Payer: COMMERCIAL

## 2024-02-27 ENCOUNTER — APPOINTMENT (OUTPATIENT)
Dept: ULTRASOUND IMAGING | Facility: CLINIC | Age: 54
DRG: 871 | End: 2024-02-27
Attending: STUDENT IN AN ORGANIZED HEALTH CARE EDUCATION/TRAINING PROGRAM
Payer: COMMERCIAL

## 2024-02-27 ENCOUNTER — COMMITTEE REVIEW (OUTPATIENT)
Dept: TRANSPLANT | Facility: CLINIC | Age: 54
End: 2024-02-27
Payer: COMMERCIAL

## 2024-02-27 LAB
ALBUMIN SERPL BCG-MCNC: 2.4 G/DL (ref 3.5–5.2)
ALP SERPL-CCNC: 188 U/L (ref 40–150)
ALT SERPL W P-5'-P-CCNC: 43 U/L (ref 0–70)
ANION GAP SERPL CALCULATED.3IONS-SCNC: 8 MMOL/L (ref 7–15)
AST SERPL W P-5'-P-CCNC: 94 U/L (ref 0–45)
BILIRUB SERPL-MCNC: 8.8 MG/DL
BUN SERPL-MCNC: 41.1 MG/DL (ref 6–20)
CALCIUM SERPL-MCNC: 8.9 MG/DL (ref 8.6–10)
CHLORIDE SERPL-SCNC: 99 MMOL/L (ref 98–107)
CREAT SERPL-MCNC: 1.37 MG/DL (ref 0.67–1.17)
DEPRECATED HCO3 PLAS-SCNC: 22 MMOL/L (ref 22–29)
EGFRCR SERPLBLD CKD-EPI 2021: 62 ML/MIN/1.73M2
ERYTHROCYTE [DISTWIDTH] IN BLOOD BY AUTOMATED COUNT: 16.9 % (ref 10–15)
GLUCOSE BLDC GLUCOMTR-MCNC: 152 MG/DL (ref 70–99)
GLUCOSE BLDC GLUCOMTR-MCNC: 246 MG/DL (ref 70–99)
GLUCOSE BLDC GLUCOMTR-MCNC: 262 MG/DL (ref 70–99)
GLUCOSE BLDC GLUCOMTR-MCNC: 335 MG/DL (ref 70–99)
GLUCOSE SERPL-MCNC: 289 MG/DL (ref 70–99)
HCT VFR BLD AUTO: 25.1 % (ref 40–53)
HGB BLD-MCNC: 8.5 G/DL (ref 13.3–17.7)
INR PPP: 3.28 (ref 0.85–1.15)
MCH RBC QN AUTO: 32.7 PG (ref 26.5–33)
MCHC RBC AUTO-ENTMCNC: 33.9 G/DL (ref 31.5–36.5)
MCV RBC AUTO: 97 FL (ref 78–100)
PLATELET # BLD AUTO: 58 10E3/UL (ref 150–450)
POTASSIUM SERPL-SCNC: 3.8 MMOL/L (ref 3.4–5.3)
PROT SERPL-MCNC: 6.2 G/DL (ref 6.4–8.3)
RBC # BLD AUTO: 2.6 10E6/UL (ref 4.4–5.9)
SODIUM SERPL-SCNC: 129 MMOL/L (ref 135–145)
WBC # BLD AUTO: 17.1 10E3/UL (ref 4–11)

## 2024-02-27 PROCEDURE — 36415 COLL VENOUS BLD VENIPUNCTURE: CPT

## 2024-02-27 PROCEDURE — 97530 THERAPEUTIC ACTIVITIES: CPT | Mod: GO

## 2024-02-27 PROCEDURE — 85610 PROTHROMBIN TIME: CPT

## 2024-02-27 PROCEDURE — 90471 IMMUNIZATION ADMIN: CPT

## 2024-02-27 PROCEDURE — 80053 COMPREHEN METABOLIC PANEL: CPT

## 2024-02-27 PROCEDURE — 250N000011 HC RX IP 250 OP 636

## 2024-02-27 PROCEDURE — 250N000013 HC RX MED GY IP 250 OP 250 PS 637

## 2024-02-27 PROCEDURE — 93975 VASCULAR STUDY: CPT | Mod: 26 | Performed by: RADIOLOGY

## 2024-02-27 PROCEDURE — 97535 SELF CARE MNGMENT TRAINING: CPT | Mod: GO

## 2024-02-27 PROCEDURE — 99232 SBSQ HOSP IP/OBS MODERATE 35: CPT | Performed by: NURSE PRACTITIONER

## 2024-02-27 PROCEDURE — 85027 COMPLETE CBC AUTOMATED: CPT

## 2024-02-27 PROCEDURE — 200N000002 HC R&B ICU UMMC

## 2024-02-27 PROCEDURE — 99232 SBSQ HOSP IP/OBS MODERATE 35: CPT | Mod: GC | Performed by: INTERNAL MEDICINE

## 2024-02-27 PROCEDURE — 90636 HEP A/HEP B VACC ADULT IM: CPT

## 2024-02-27 PROCEDURE — 93975 VASCULAR STUDY: CPT

## 2024-02-27 PROCEDURE — 250N000013 HC RX MED GY IP 250 OP 250 PS 637: Performed by: STUDENT IN AN ORGANIZED HEALTH CARE EDUCATION/TRAINING PROGRAM

## 2024-02-27 RX ADMIN — Medication 10 MG: at 20:49

## 2024-02-27 RX ADMIN — LACTULOSE 20 G: 20 SOLUTION ORAL at 08:14

## 2024-02-27 RX ADMIN — RIFAXIMIN 550 MG: 550 TABLET ORAL at 08:14

## 2024-02-27 RX ADMIN — RIFAXIMIN 550 MG: 550 TABLET ORAL at 20:49

## 2024-02-27 RX ADMIN — PANTOPRAZOLE SODIUM 40 MG: 40 TABLET, DELAYED RELEASE ORAL at 08:14

## 2024-02-27 RX ADMIN — PANTOPRAZOLE SODIUM 40 MG: 40 TABLET, DELAYED RELEASE ORAL at 15:37

## 2024-02-27 RX ADMIN — HEPATITIS A AND HEPATITIS B (RECOMBINANT) VACCINE 1 ML: 720; 20 INJECTION, SUSPENSION INTRAMUSCULAR at 13:30

## 2024-02-27 RX ADMIN — LACTULOSE 20 G: 20 SOLUTION ORAL at 14:12

## 2024-02-27 RX ADMIN — HYDROCORTISONE SODIUM SUCCINATE 50 MG: 100 INJECTION, POWDER, FOR SOLUTION INTRAMUSCULAR; INTRAVENOUS at 05:35

## 2024-02-27 RX ADMIN — LACTULOSE 20 G: 20 SOLUTION ORAL at 20:49

## 2024-02-27 RX ADMIN — MIDODRINE HYDROCHLORIDE 10 MG: 5 TABLET ORAL at 12:48

## 2024-02-27 RX ADMIN — THIAMINE HYDROCHLORIDE 250 MG: 100 INJECTION, SOLUTION INTRAMUSCULAR; INTRAVENOUS at 08:22

## 2024-02-27 RX ADMIN — CEFTRIAXONE SODIUM 2 G: 2 INJECTION, POWDER, FOR SOLUTION INTRAMUSCULAR; INTRAVENOUS at 15:37

## 2024-02-27 RX ADMIN — MIDODRINE HYDROCHLORIDE 10 MG: 5 TABLET ORAL at 20:57

## 2024-02-27 RX ADMIN — FOLIC ACID 1 MG: 1 TABLET ORAL at 08:14

## 2024-02-27 RX ADMIN — MIDODRINE HYDROCHLORIDE 10 MG: 5 TABLET ORAL at 05:34

## 2024-02-27 ASSESSMENT — ACTIVITIES OF DAILY LIVING (ADL)
ADLS_ACUITY_SCORE: 39
ADLS_ACUITY_SCORE: 43
ADLS_ACUITY_SCORE: 39
ADLS_ACUITY_SCORE: 43
ADLS_ACUITY_SCORE: 39
ADLS_ACUITY_SCORE: 43
ADLS_ACUITY_SCORE: 39
ADLS_ACUITY_SCORE: 43
ADLS_ACUITY_SCORE: 39
ADLS_ACUITY_SCORE: 43
ADLS_ACUITY_SCORE: 43
ADLS_ACUITY_SCORE: 39
ADLS_ACUITY_SCORE: 43

## 2024-02-27 NOTE — PROGRESS NOTES
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 Initial blood cultures with strep bovis.       RECOMMENDATIONS:    Updates for 02/27/2024 :  - continue abx for 14 days  - plan to discuss in liver transplant conference today.        #Strep bovis bacteremia  - Blood cultures with strep bovis 2/2 bottles on 2/24. Follow up cultures negative. Started on abx 2/23 with cefepime. Now on ceftriaxone. Tx ID following. Per Dr. Conley, no clinical indication for ANEL.   - TTE 2/23 does not have evidence of vegetation.   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 Listed, ON HOLD, due to current infection. Did have recent COVID + (2/15), on admit was negative. Will discuss timing of reactivation at tx conference 2/27.   - US abd 12/14 without HCC    # Hepatic encephalopathy  - mentation continues to improve. Continue lactulose and rifaximin, titrate for ~3 BM's per day.     # Ascites  - small ascites. No safe pocket for para.   - diuretics on hold due to ANGEL, continue to monitor.   - does have anasarca, lymphedema therapy     # EV  - EGD with gr I EV and GOV2 on 7/26/23. No current sign of GIB.    # Debility  - continue PT/OT for strengthening.     Patient was discussed with attending physician, Dr. Liana Roldan.  The above note reflects our joint plan.     Next follow up appointment: Dr. Muniz 4/16/24    Thank  "you for the opportunity to be involved with  [facility]. Please call with any questions or concerns.    LESA Rosales, CNP  Inpatient Hepatology [facility]          Subjective    Has been feeling improved and has been making more jokes. Reports looking forward to walking in the halls. Has appetite.         Objective    VS: /88   Pulse 79   Temp 97.7  F (36.5  C) (Oral)   Resp 17   Ht 1.727 m (5' 8\")   Wt 110.4 kg (243 lb 6.2 oz)   SpO2 96%   BMI 37.01 kg/m       Gross per 24 hour   Intake 1685 ml   Output 2725 ml   Net -1040 ml      General: In no acute distress, mild facial muscle wasting  Neuro: AOx3, No asterixis  HEENT:  Noscleral icterus, Nooral lesions  CV: . Skin warm and dry  Lungs:  Respirations even and nonlabored on room air  Abd:  Nontender, nondistended   Extrem:  +1 lower  peripheral edema   Skin:  mild jaundice  Psych: pleasant    MEDICATIONS:  Current Facility-Administered Medications   Medication    acetaminophen (TYLENOL) tablet 325 mg    cefTRIAXone (ROCEPHIN) 2 g vial to attach to  ml bag for ADULTS or NS 50 ml bag for PEDS    dextrose 10% infusion    glucose gel 15-30 g    Or    dextrose 50 % injection 25-50 mL    Or    glucagon injection 1 mg    glucose gel 15-30 g    Or    dextrose 50 % injection 25-50 mL    Or    glucagon injection 1 mg    diclofenac (VOLTAREN) 1 % topical gel 4 g    folic acid (FOLVITE) tablet 1 mg    hepatitis A-hepatitis B (TWINRIX) injection 1 mL    insulin aspart (NovoLOG) injection (RAPID ACTING)    insulin aspart (NovoLOG) injection (RAPID ACTING)    insulin aspart (NovoLOG) injection (RAPID ACTING)    insulin glargine (LANTUS PEN) injection 20 Units    lactulose (CHRONULAC) solution 20 g    melatonin tablet 10 mg    midodrine (PROAMATINE) tablet 10 mg    ondansetron (ZOFRAN ODT) ODT tab 4 mg    Or    ondansetron (ZOFRAN) injection 4 mg    pantoprazole (PROTONIX) EC tablet 40 mg    rifaximin (XIFAXAN) tablet 550 mg    sodium chloride " 0.9% BOLUS 1-250 mL    thiamine (B-1) injection 250 mg    Followed by    [START ON 3/2/2024] thiamine (B-1) tablet 100 mg       REVIEW OF LABORATORY, PATHOLOGY AND IMAGING RESULTS:  BMP  Recent Labs   Lab 24  0809 24  0558 24  2207 24  1800 24  0433 24  0343 24  0441 24  0440 24  1742 24  1448   NA  --  129*  --   --   --  130*  --  127*  --  126*   POTASSIUM  --  3.8  --   --   --  3.8  --  4.5  --  4.2   CHLORIDE  --  99  --   --   --  100  --  97*  --  96*   MEG  --  8.9  --   --   --  8.6  --  8.2*  --  8.0*   CO2  --  22  --   --   --  22  --  21*  --  21*   BUN  --  41.1*  --   --   --  41.6*  --  43.2*  --  43.5*   CR  --  1.37*  --   --   --  1.48*  --  1.64*  --  1.88*   * 289* 302* 341*   < > 130*   < > 334*   < > 241*    < > = values in this interval not displayed.     CBC  Recent Labs   Lab 24  01224  1740   WBC 17.1* 22.4* 20.1* 17.4*   RBC 2.60* 2.33* 2.42* 2.05*   HGB 8.5* 7.6* 7.9* 6.8*   HCT 25.1* 22.2* 22.9* 19.8*   MCV 97 95 95 97   MCH 32.7 32.6 32.6 33.2*   MCHC 33.9 34.2 34.5 34.3   RDW 16.9* 16.6* 16.2* 15.6*   PLT 58* 58* 66* 45*     INR  Recent Labs   Lab 24  0343 02/25/24  0852 24  0831   INR 3.28* 3.04* 2.78* 4.83*     LFTs  Recent Labs   Lab 24  0558 24  0343 24  0440 24  0400   ALKPHOS 188* 190* 170* 207*   AST 94* 92* 63* 59*   ALT 43 36 23 26   BILITOTAL 8.8* 8.4* 9.3* 11.7*   PROTTOTAL 6.2* 5.9* 6.0* 6.3*   ALBUMIN 2.4* 2.4* 2.5* 2.4*        MELD 3.0: 34 at 2024  5:58 AM  MELD-Na: 33 at 2024  5:58 AM  Calculated from:  Serum Creatinine: 1.37 mg/dL at 2024  5:58 AM  Serum Sodium: 129 mmol/L at 2024  5:58 AM  Total Bilirubin: 8.8 mg/dL at 2024  5:58 AM  Serum Albumin: 2.4 g/dL at 2024  5:58 AM  INR(ratio): 3.28 at 2024  5:58 AM  Age at listin years  Sex: Male at 2024  5:58  AM    Previous work-up:   Lab Results   Component Value Date    HEPBANG Nonreactive 02/15/2024    HBCAB Nonreactive 02/15/2024    AUSAB <3.50 02/15/2024    HCVAB Nonreactive 02/15/2024    KE 2,948 (H) 12/19/2023    IRONSAT  12/19/2023      Comment:      Unable to calculate:  UIBC or Iron value is outside detectable level.    TTG 2.2 09/13/2023    TTGG 2.0 09/13/2023     (H) 09/13/2023    GEORGE Negative 11/28/2023    SMOOTHMUS 70 (H) 09/13/2023    W0JQLVJ Whole Blood 09/13/2023    A1A 97 09/13/2023    J8OCTWV Negative 09/13/2023    V6TWFAB Heterozygous (A) 09/13/2023    J4MZGAJG Not Applicable 09/13/2023    A3VNKZOEU See Note 09/13/2023    MITM2 1.2 08/23/2023    TSH 2.18 03/12/2022    CHOL 203 (H) 03/12/2022    HDL 71 03/12/2022     (H) 03/12/2022    TRIG 151 (H) 03/12/2022    A1C 4.8 02/25/2024    POPETH <10 02/13/2024    PLPETH <10 02/13/2024      Lab Results   Component Value Date    SPECDES  09/13/2023     Blood: ACD      LDRESULTS  09/13/2023     HEMOCHROMATOSIS RESULTS    HFE Gene C282Y (G845A) RESULTS:    C282Y Mutation Interpretation: HETEROZYGOTE    HFE Gene H63D (C187G) RESULTS:    H63D Mutation Interpretation: NORMAL    HFE Gene S65C (A193T) RESULTS:    S65C Mutation Interpretation: NORMAL           Imaging Results:  None current

## 2024-02-27 NOTE — PROGRESS NOTES
ICU End of Shift Summary. See flowsheets for vital signs and detailed assessment.    Changes this shift: No acute changes.  Pt slept part of the night.  Mentation improving, but intermittently forgetful.  Up to bathroom x1.  Stool/urine.  -289 this AM.  Lantus and sliding scale insulin given.      Plan: Transfer to C when bed available.  Monitor BS.

## 2024-02-27 NOTE — COMMITTEE REVIEW
Abdominal Committee Review Note     Evaluation Date: 12/19/2023  Committee Review Date: 2/27/2024    Organ being evaluated for: Liver    Transplant Phase: Waitlist  Transplant Status: Inactive    Transplant Coordinator: Keaton Martinez Jr.  Transplant Surgeon:   Ryder Cortez    Referring Physician: Riley Chicas    Primary Diagnosis: Alcohol-Associated Cirrhosis Without Acute Alcohol-Associated Hepatitis  Secondary Diagnosis:     Committee Review Members:  Anesthesiology Seb Nicholas, DO   Nutrition Mitali Ly, RD   Pharmacy Dena Martinez, [facility]    - Clinical Tee Shepherd, MSCALI, Antolin Deleon, [facility]   Transplant Lore Mckeon, RN, Sayda Herrera, RN, Jr Keaton Martinez, BRIDGET, Mary Taylor, BRIDGET, Estiven Anand, RN, Tomas Berrios, RN   Transplant Hepatology  Quynh Longoria MD, Sher Dutton PA-C, Sher Bailey MD, Marcella Betancourt, BRIDGET, Liana Roldan MD, Lasha Rodriguez MD, Hugo Muniz MD, Thomas M. Leventhal, MD   Transplant Surgery Ying Bowling, APRN CNP, Vicki Mccord NP, Estiven Martel MD, Ryder Cortez MD, Chris Garcia MD       Transplant Eligibility: Cirrhosis with MELD, ETOH    Committee Review Decision: Make Active    Relative Contraindications: None    Absolute Contraindications: None    Committee Chair Liana Roldan MD verbally attested to the committee's decision.    Committee Discussion Details:     Reactivate ASAP

## 2024-02-27 NOTE — PROGRESS NOTES
02/26/24 1000   Appointment Info   Signing Clinician's Name / Credentials (OT) Alecia Rueda OT   Living Environment   People in Home spouse   Current Living Arrangements   ([facility])   Home Accessibility stairs within home   Number of Stairs, Within Home, Primary seven   Stair Railings, Within Home, Primary railings safe and in good condition   Transportation Anticipated family or friend will provide   Living Environment Comments Pt lives with spouse in [facility]. No BENNIE from garage. Split-level entry. Bathroom/bedroom on lower level.   Self-Care   Usual Activity Tolerance fair   Current Activity Tolerance fair   Equipment Currently Used at Home none   Fall history within last six months yes   Number of times patient has fallen within last six months 2   Activity/Exercise/Self-Care Comment Since Sept 2023, spouse has been assisting with LB dressing due to swelling in LE & abdomen.   Instrumental Activities of Daily Living (IADL)   IADL Comments Has an active drivers license but hasn't driven since July 2023, also hasn't worked as a  since then.   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 He was noted to have a fever, hypothermia and worsening liver tests.   Existing Precautions/Restrictions fall   Left Upper Extremity (Weight-bearing Status) full weight-bearing (FWB)   Right Upper Extremity (Weight-bearing Status) full weight-bearing (FWB)   Left Lower Extremity  (Weight-bearing Status) full weight-bearing (FWB)   Right Lower Extremity (Weight-bearing Status) full weight-bearing (FWB)   Heart Disease Risk Factors Medical history   Cognitive Status Examination   Orientation Status person  (pt is grossly oriented to place (knew he was in the hospital) and date (knew day of week & year).)   Cognitive Status Comments Pt slightly impulsive   Sensory   Sensory Quick Adds sensation intact   Range of Motion Comprehensive   Comment, General Range of Motion limited AROM in R shoulder due to recent fall.   Strength Comprehensive (MMT)   Comment, General Manual Muscle Testing (MMT) Assessment overall generalized weakness   Bed Mobility   Comment (Bed Mobility) CGA   Transfers   Transfer Comments STS CGA   Activities of Daily Living   BADL Assessment/Intervention lower body dressing   Lower Body Dressing Assessment/Training   Position (Lower Body Dressing) edge of bed sitting   Comment, (Lower Body Dressing) maxA   [place] Level (Lower Body Dressing) don;socks;pants/bottoms   Clinical Impression   Criteria for Skilled Therapeutic Interventions Met (OT) Yes, treatment indicated   OT Diagnosis decreased IND with I/ADLs   OT Problem List-Impairments impacting ADL problems related to;activity tolerance impaired;mobility;strength;range of motion (ROM)   Assessment of Occupational Performance 5 or more Performance Deficits   Identified Performance Deficits LB dressing, UB dressing, g/h, cognition, home mgmt, UE strength   Planned Therapy Interventions (OT) ADL retraining;IADL retraining;bed mobility training;strengthening;transfer training   Clinical Decision Making Complexity (OT) problem focused assessment/low complexity   Risk & Benefits of therapy have been explained evaluation/treatment results reviewed   Clinical Impression Comments Pt is appropriate for skilled OT services to address deficits in functional tasks & mobility   OT Total Evaluation Time   OT Eval, Low Complexity  Minutes (97997) 10   OT Goals   Therapy Frequency (OT) 6 times/week   OT Predicted Duration/Target Date for Goal Attainment 03/15/24   OT Goals Hygiene/Grooming;Upper Body Dressing;Lower Body Dressing;Bed Mobility;Cognition   OT: Hygiene/Grooming modified independent;while standing   OT: Upper Body Dressing Modified independent   OT: Lower Body Dressing Minimal assist   OT: Bed Mobility Minimal assist   OT: Cognitive Patient/caregiver will verbalize understanding of cognitive assessment results/recommendations as needed for safe discharge planning   Interventions   Interventions Quick Adds Therapeutic Activity   Therapeutic Activities   Therapeutic Activity Minutes (11326) 40   Symptoms noted during/after treatment fatigue   Treatment Detail/Skilled Intervention OT: Facilitated OOB activity to promote IND & safety with ADLs. On RA, SpO2 mid-90s, HR 75 bpm, BP while in supine 97/58 (66). Pt supine in bed upon arrival, wife present and supportive. Rolling>R CGA. Supine>sit CGA. Sitting balance EOB SBA. Don/doff socks maxA, don shorts modA while sitting EOB. STS from EOB without AD CGA. Amb around unit, x200 ft, pushing IV pole with CGA, slow gait, easily distracted, min cues to redirect, standing rest break ~30 sec. Returned to room. Recliner transfer with Amee to descend. Pt oriented to self, grossly oriented to time (knew day of the week & year) & place (hospital). All needs within reach at end of session.   OT Discharge Planning   OT Plan congition, LB dressing with AE prn, toilet transfer, ADLs standing at sink   OT Discharge Recommendation (DC Rec) home with assist   OT Rationale for DC Rec Pt is currently below baseline with functional tasks & mobility but anticipate pt will be able to d/c to home once medically stable. Pt's wife works from home & able to assist with heavy I/ADLs prn.   OT Brief overview of current status CGA w/ IV pole   Total Session Time   Timed Code Treatment Minutes 40   Total Session Time  (sum of timed and untimed services) 50

## 2024-02-27 NOTE — PROGRESS NOTES
[facility]    Progress Note - Medicine Service, [facility] TEAM 1       Date of Admission:  2/23/2024    Assessment & Plan   Mary Lopez is a 53 year old male admitted on 2/23/2024 with a PMH of alcoholic cirrhosis (sober since 6/25/2023) c/b ascites, hepatic encephalopathy, grade 1 esophageal varices, transplant candidate (although inactive on list due to acute illness), and T2DM who was admitted to the ICU after presenting to the ED febrile, hypotensive, and encephalopathic. Found to have strep bovis bacteremia likely 2/2 to SBP/GI source and septic shock requiring minimal pressors, now off pressors with improving blood pressures.     Updates Today  - improving leukocytosis  - gave first dose of TwinRix today  - increased meal coverage insulin to 14 units  - increased melatonin to 10 mg at bedtime  - Lymphedema consult placed for edema  - Hepatology to discuss at Liver transplant conference today    #Streptococcus galloylyticus (bovis) bacteremia likely 2/2 GI/SBP source (gut translocation)  #Leukocytosis, improving  #Septic Shock, resolved  Patient presented to the ED on 2/23/2024 with fevers, tachycardia, hypotension, leukocytosis, and encephalopathy. Patient given fluids, but eventually started on levophed for concerns of septic shock. Blood culture grew streptococcus galloylyticus (bovis). MRSA swab and UA negative. Source suspected to be 2/2 SBP or GI source with translocation. Patient with history of ascites and suspected SBP with last hospitalization. Unable to complete paracentesis on this admission, due to pocket being too small to complete procedure. Echo from 2/24/24 showed no concern for vegetation. Colonoscopy from 1/2/24 did not show any signs of malignancy. Patient weaned off of levophed in the ED, started on midodrine and stress dose steroids. Steroids have been slowly weaned. Blood pressure has remained stable. Does have increased  leukocytosis, but notably on high dose stress steroids that are being weaned. Leukocytosis continues to improve today.  -Transplant ID following, appreciate recs   > continue ceftriaxone 2 g Q24H through 3/8/2024 to complete 14 day course (from 2/24/24)   > From ID prospective, can be listed for liver transplant at any point   > ANEL is not needed (given rapid bacteremia clearance)   > monitor CBC   > gave first dose of TwinRix today  - continue midodrine 10 mg Q8H  - final day of hydrocortisone 50 mg today, discontinue tomorrow  - MAP goal >65     Antimicrobials:  -Cefepime (02/23-02/25)  -Vancomycin (02/23-02/25)     Cultures:  - 2/26 Bcx: pending   - 2/25 Bcx: NGTD  - 2/24 Bcx: NGTD  - 2/23 Bcx 2/2 bottles: Streptococcus gallolyticus (Streptococcus bovis group)  - 2/23 verigene: no organism detected      #Decompensated cirrhosis 2/2 Alcohol Cirrhosis MELD 3.0-34   #Alpha-1 antitrypsin MZ heterozygote, C282Y heterozygote  #Hepatic Encephalopathy  #Grade I Esophageal Varices, no Hx of variceal bleed  Patient initially presented on 2/15 for liver transplant, but found to be COVID+ and donor liver transplant was ineligible. Most recent EGD in July 2023 found to have G1 EV/GOV Type1 with no hx of bleeding. Patient with history of suspected SBP in last admission (1/2024), but did not have pocket to complete paracentesis. Patient currently on PTA lactulose and rifaximin for hepatic encephalopathy. Patient liver transplant candidate, currently inactive due to acute illness but per ID patient would be able to eligible once again  now that bacteremia has improved. Patient given albumin on 2/24 per GI recs. Patient with noted history of hepatic encephalopathy, but patient's mental status seems to be at baseline now s/p antibiotics. Plan to discuss patient in liver transplant conference today now that he is cleared from ID standpoint.   - Hepatology Consulted, appreciate recs  - continue pantoprazole 40 mg BID  - continue PTA  Lactulose 20 g TID (goal of 3-4 BM per day)  - continue rifaximin 550mg BID  - continue PTA folate and high dose thiamine  - MELD labs daily  - holding PTA spironolactone 25 mg daily and torsemide 30 mg daily  - lymphedema consult placed    MELD 3.0: 34 at 2024  5:58 AM  MELD-Na: 33 at 2024  5:58 AM  Calculated from:  Serum Creatinine: 1.37 mg/dL at 2024  5:58 AM  Serum Sodium: 129 mmol/L at 2024  5:58 AM  Total Bilirubin: 8.8 mg/dL at 2024  5:58 AM  Serum Albumin: 2.4 g/dL at 2024  5:58 AM  INR(ratio): 3.28 at 2024  5:58 AM  Age at listin years  Sex: Male at 2024  5:58 AM    #Acute on Chronic Metabolic Encephalopathy (HE vs Sepsis), improving  #c/f delirium  Patient with decompensated cirrhosis complicated by history of hepatic encephalopathy, who presented with altered mental status. Likely compounded by sepsis and elevated ammonium levels. Ammonia levels decreased to 46 on  from 64 on . Today, patient's mentation seems to be at baseline per wife since . Additional concerns for delirium, as it appears that patient's confusion is worse at night.   - delirium precautions  - continue PTA lactulose and rifaximin  - increased melatonin to 10 mg at bedtime  - hold PTA doxepin 10 mg daily, but will consider restarting if blood pressures remain stable    #Acute Kidney Injury, likely shock/prerenal, improving  #hyponatremia  Creatinine elevated to 2.5 upon admission (baseline 0.6-1.2). Upon transfer to medicine floor, improved to 1.48. Etiology thought likely due to pre-renal cause with hypotension/hypovolemia with septic shock. Received 2L NS in ED and albumin in ICU. Additionally, sodium low at 130, but has consistently been low throughout admission and improved from 123 upon admission.   - CMP daily  - holding PTA spironolactone 25 mg daily and torsemide 30 mg daily  - daily weights  - I&Os  - encourage oral intake    #Type 2 DM  #Concerning for hypoglycemic  episode  #Steroid induced hyperglycemia  Prior to admission, patient was taking 36 units Tresiba and 15 units Humalog w/breakfast and lunch. 13 units of dinner. Placed on insulin drip while in ICU due to acute illness and elevated blood sugars. Transitioned off insulin drip to glargine and aspart for meal time insulin. Per ICU team, concerns that patient was being given excess insulin at home with hypoglycemic episode, diabetes education consulted while patient here. Still elevated blood sugars this AM, but notably final day of steroids. Will make adjustments and readdress in AM.   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 7.6 upon transfer to floor team. Notes of hematoma noted at RIJ site on 2/24. Hematoma mildly tender to touch, but hemoglobin has remained stable and patient otherwise hemodynamically stable. INR elevated to 4.8, fibrogen <60. Most likely in setting of end stage liver disease. Important to r/o DIC so factor 8 assay is obtained, did not indicate DIC.Teg results consistent hypofibrinogenemia and thrombocytopenia vs platelet dysfunction. Has received various blood products to maintain lab values (Vitamin K x2, 3 units pRBC, 2 units cryo, 1 unit platelets) while in ICU.   - Transfuse to keep hemoglobin >7, platelet >20K, and fibrinogen >100K   - CBC daily  - continue to monitor hematoma for worsening bleeding, but otherwise stable    #Alcohol Use Dependence, in remission  Sober since 06/25/2023.      #Hyperkalemia, resolved  K+ elevated to 5.7 (02/24) improved to 3.8 (02/26) s/p lokelma x1.  -Trend BMP  "daily      #R shoulder tear-chronic  Pt had a fall in October and now has pain in shoulder. Was told not a candidate for surgery, recently started hydrocodone on 2/19.  - hold PTA hydrocodone   - Voltaren gel         Diet: Combination Diet Moderate Consistent Carb (60 g CHO per Meal) Diet; 2 gm NA Diet    DVT Prophylaxis: Pneumatic Compression Devices  Moore Catheter: Not present  Fluids: None  Lines: None       Cardiac Monitoring: ACTIVE order. Indication: ICU  Code Status: Full Code      Clinically Significant Risk Factors         # Hyponatremia: Lowest Na = 129 mmol/L in last 2 days, will monitor as appropriate   # Hypercalcemia: corrected calcium is >10.1, will monitor as appropriate    # Hypoalbuminemia: Lowest albumin = 2.4 g/dL at 2/27/2024  5:58 AM, will monitor as appropriate    # Coagulation Defect: INR = 3.28 (Ref range: 0.85 - 1.15) and/or PTT = 67 Seconds (Ref range: 22 - 38 Seconds), will monitor for bleeding  # Thrombocytopenia: Lowest platelets = 58 in last 2 days, will monitor for bleeding   # Hypertension: Noted on problem list        # Obesity: Estimated body mass index is 37.01 kg/m  as calculated from the following:    Height as of this encounter: 1.727 m (5' 8\").    Weight as of this encounter: 110.4 kg (243 lb 6.2 oz).             Disposition Plan         The patient's care was discussed with the Attending Physician, Dr. Garcia .    TALA WILKINSON MD  Medicine Service, [facility] TEAM [address] [place] [address]  Securely message with [employer] (more info)  Text page via [facility] Paging/Directory   See signed in provider for up to date coverage information  ______________________________________________________________________    Interval History   Nursing notes reviewed, no acute events overnight. Patient notes that he is overall feeling well and denies any chest pain, dyspnea, fevers/chills, abdominal pain, nausea/vomiting, or dizziness/headaches. Patient's " mentation noted to be clear and patient's wife notes that he appears to be doing really well today. Does confirm some increased leg swelling which is noted on exam, but is otherwise feeling fine.     Physical Exam   Vital Signs: Temp: 97.5  F (36.4  C) Temp src: Axillary BP: 98/58 Pulse: 75   Resp: 16 SpO2: 99 % O2 Device: None (Room air)    Weight: 243 lbs 6.21 oz    General: NAD, sitting comfortably in chair next to bed   HEENT: EOMI, PERRLA, atraumatic, scleral icterus noted, hematoma noted on RIJ site, slightly tender to touch   CV: RRR, no murmur noted  Lungs: clear to auscultation bilaterally, no wheezes or crackles noted, breathing non-labored on room air  Abdomen: soft, non-tender to palpation, non-distended, bowel sounds active  Neuro: alert and oriented to person, time. Not place, but noted to be baseline according to wife. Conversationally tangential, but he notes that he overall feels well. No asterixis or clonus.  No focal deficits noted  Skin: overall appears jaundiced  Extremity: 3+ pitting edema in bilateral lower extremities    Medical Decision Making       Please see A&P for additional details of medical decision making.      Data     I have personally reviewed the following data over the past 24 hrs:    17.1 (H)  \   8.5 (L)   / 58 (L)     129 (L) 99 41.1 (H) /  335 (H)   3.8 22 1.37 (H) \     ALT: 43 AST: 94 (H) AP: 188 (H) TBILI: 8.8 (H)   ALB: 2.4 (L) TOT PROTEIN: 6.2 (L) LIPASE: N/A     INR:  3.28 (H) PTT:  N/A   D-dimer:  N/A Fibrinogen:  N/A       Imaging results reviewed over the past 24 hrs:   No results found for this or any previous visit (from the past 24 hour(s)).

## 2024-02-28 ENCOUNTER — APPOINTMENT (OUTPATIENT)
Dept: PHYSICAL THERAPY | Facility: CLINIC | Age: 54
DRG: 871 | End: 2024-02-28
Payer: COMMERCIAL

## 2024-02-28 ENCOUNTER — APPOINTMENT (OUTPATIENT)
Dept: OCCUPATIONAL THERAPY | Facility: CLINIC | Age: 54
DRG: 871 | End: 2024-02-28
Payer: COMMERCIAL

## 2024-02-28 LAB
ALBUMIN SERPL BCG-MCNC: 2.4 G/DL (ref 3.5–5.2)
ALP SERPL-CCNC: 191 U/L (ref 40–150)
ALT SERPL W P-5'-P-CCNC: 46 U/L (ref 0–70)
ANION GAP SERPL CALCULATED.3IONS-SCNC: 7 MMOL/L (ref 7–15)
AST SERPL W P-5'-P-CCNC: 92 U/L (ref 0–45)
BACTERIA BLD CULT: NO GROWTH
BILIRUB SERPL-MCNC: 8.6 MG/DL
BUN SERPL-MCNC: 36.3 MG/DL (ref 6–20)
CALCIUM SERPL-MCNC: 9.1 MG/DL (ref 8.6–10)
CHLORIDE SERPL-SCNC: 101 MMOL/L (ref 98–107)
CREAT SERPL-MCNC: 1.24 MG/DL (ref 0.67–1.17)
DEPRECATED HCO3 PLAS-SCNC: 22 MMOL/L (ref 22–29)
EGFRCR SERPLBLD CKD-EPI 2021: 70 ML/MIN/1.73M2
ERYTHROCYTE [DISTWIDTH] IN BLOOD BY AUTOMATED COUNT: 17 % (ref 10–15)
GLUCOSE BLDC GLUCOMTR-MCNC: 104 MG/DL (ref 70–99)
GLUCOSE BLDC GLUCOMTR-MCNC: 105 MG/DL (ref 70–99)
GLUCOSE BLDC GLUCOMTR-MCNC: 116 MG/DL (ref 70–99)
GLUCOSE BLDC GLUCOMTR-MCNC: 120 MG/DL (ref 70–99)
GLUCOSE BLDC GLUCOMTR-MCNC: 160 MG/DL (ref 70–99)
GLUCOSE BLDC GLUCOMTR-MCNC: 54 MG/DL (ref 70–99)
GLUCOSE SERPL-MCNC: 178 MG/DL (ref 70–99)
HCT VFR BLD AUTO: 22.8 % (ref 40–53)
HGB BLD-MCNC: 7.8 G/DL (ref 13.3–17.7)
INR PPP: 3.36 (ref 0.85–1.15)
MCH RBC QN AUTO: 32.8 PG (ref 26.5–33)
MCHC RBC AUTO-ENTMCNC: 34.2 G/DL (ref 31.5–36.5)
MCV RBC AUTO: 96 FL (ref 78–100)
PLATELET # BLD AUTO: 59 10E3/UL (ref 150–450)
POTASSIUM SERPL-SCNC: 3.5 MMOL/L (ref 3.4–5.3)
PROT SERPL-MCNC: 5.8 G/DL (ref 6.4–8.3)
RBC # BLD AUTO: 2.38 10E6/UL (ref 4.4–5.9)
SODIUM SERPL-SCNC: 130 MMOL/L (ref 135–145)
WBC # BLD AUTO: 15.7 10E3/UL (ref 4–11)

## 2024-02-28 PROCEDURE — 85027 COMPLETE CBC AUTOMATED: CPT

## 2024-02-28 PROCEDURE — 99232 SBSQ HOSP IP/OBS MODERATE 35: CPT | Mod: GC | Performed by: INTERNAL MEDICINE

## 2024-02-28 PROCEDURE — 97110 THERAPEUTIC EXERCISES: CPT | Mod: GO

## 2024-02-28 PROCEDURE — 85610 PROTHROMBIN TIME: CPT

## 2024-02-28 PROCEDURE — 97116 GAIT TRAINING THERAPY: CPT | Mod: GP

## 2024-02-28 PROCEDURE — 120N000002 HC R&B MED SURG/OB UMMC

## 2024-02-28 PROCEDURE — 97140 MANUAL THERAPY 1/> REGIONS: CPT | Mod: GO

## 2024-02-28 PROCEDURE — 250N000011 HC RX IP 250 OP 636

## 2024-02-28 PROCEDURE — 97530 THERAPEUTIC ACTIVITIES: CPT | Mod: GO

## 2024-02-28 PROCEDURE — 36415 COLL VENOUS BLD VENIPUNCTURE: CPT

## 2024-02-28 PROCEDURE — 250N000013 HC RX MED GY IP 250 OP 250 PS 637

## 2024-02-28 PROCEDURE — 250N000013 HC RX MED GY IP 250 OP 250 PS 637: Performed by: STUDENT IN AN ORGANIZED HEALTH CARE EDUCATION/TRAINING PROGRAM

## 2024-02-28 PROCEDURE — 80053 COMPREHEN METABOLIC PANEL: CPT

## 2024-02-28 PROCEDURE — 99232 SBSQ HOSP IP/OBS MODERATE 35: CPT | Performed by: NURSE PRACTITIONER

## 2024-02-28 RX ORDER — SPIRONOLACTONE 50 MG/1
50 TABLET, FILM COATED ORAL DAILY
Status: DISCONTINUED | OUTPATIENT
Start: 2024-02-28 | End: 2024-03-02 | Stop reason: HOSPADM

## 2024-02-28 RX ADMIN — RIFAXIMIN 550 MG: 550 TABLET ORAL at 07:32

## 2024-02-28 RX ADMIN — FOLIC ACID 1 MG: 1 TABLET ORAL at 07:32

## 2024-02-28 RX ADMIN — PANTOPRAZOLE SODIUM 40 MG: 40 TABLET, DELAYED RELEASE ORAL at 07:32

## 2024-02-28 RX ADMIN — LACTULOSE 20 G: 20 SOLUTION ORAL at 07:32

## 2024-02-28 RX ADMIN — THIAMINE HYDROCHLORIDE 250 MG: 100 INJECTION, SOLUTION INTRAMUSCULAR; INTRAVENOUS at 07:33

## 2024-02-28 RX ADMIN — Medication 10 MG: at 20:45

## 2024-02-28 RX ADMIN — CEFTRIAXONE SODIUM 2 G: 2 INJECTION, POWDER, FOR SOLUTION INTRAMUSCULAR; INTRAVENOUS at 16:30

## 2024-02-28 RX ADMIN — MIDODRINE HYDROCHLORIDE 10 MG: 5 TABLET ORAL at 11:36

## 2024-02-28 RX ADMIN — RIFAXIMIN 550 MG: 550 TABLET ORAL at 20:46

## 2024-02-28 RX ADMIN — MIDODRINE HYDROCHLORIDE 10 MG: 5 TABLET ORAL at 03:47

## 2024-02-28 RX ADMIN — SPIRONOLACTONE 50 MG: 50 TABLET ORAL at 13:00

## 2024-02-28 RX ADMIN — PANTOPRAZOLE SODIUM 40 MG: 40 TABLET, DELAYED RELEASE ORAL at 16:30

## 2024-02-28 RX ADMIN — MIDODRINE HYDROCHLORIDE 10 MG: 5 TABLET ORAL at 20:45

## 2024-02-28 RX ADMIN — LACTULOSE 20 G: 20 SOLUTION ORAL at 13:00

## 2024-02-28 RX ADMIN — LACTULOSE 20 G: 20 SOLUTION ORAL at 20:44

## 2024-02-28 ASSESSMENT — ACTIVITIES OF DAILY LIVING (ADL)
ADLS_ACUITY_SCORE: 36
ADLS_ACUITY_SCORE: 36
ADLS_ACUITY_SCORE: 37
ADLS_ACUITY_SCORE: 36
ADLS_ACUITY_SCORE: 37
ADLS_ACUITY_SCORE: 36
ADLS_ACUITY_SCORE: 37
ADLS_ACUITY_SCORE: 36
ADLS_ACUITY_SCORE: 37
ADLS_ACUITY_SCORE: 36

## 2024-02-28 NOTE — PROGRESS NOTES
CLINICAL NUTRITION SERVICES - ASSESSMENT NOTE     Nutrition Prescription    RECOMMENDATIONS FOR MDs/PROVIDERS TO ORDER:  None currently    Malnutrition Status:    Moderate malnutrition in the context of chronic illness/disease    Recommendations already ordered by Registered Dietitian (RD):  Glucerna Therapeutic Nutrition Shake BID - strawberry (each contains 220 kcal, 10 grams protein, 26 grams carbohydrate, 9 grams fat, 4 grams fiber per 8 fl oz)      Future/Additional Recommendations:  Monitor PO     REASON FOR ASSESSMENT  Mary Lopez is a/an 53 year old male assessed by the dietitian for Provider Order - Assess for malnutrition status and make recs for diet, supplements.    Patient admitted to the ICU after presenting to the ED febrile, hypotensive, and encephalopathic. Found to have strep bovis bacteremia likely 2/2 to SBP/GI source and septic shock requiring minimal pressors, now off pressors with improving blood pressures. Plan to transfer to the floor when bed is available.     MEDICAL HISTORY  alcoholic cirrhosis (sober since 6/25/2023) c/b ascites, hepatic encephalopathy, grade 1 esophageal varices, transplant candidate (although inactive on list due to acute illness), and T2DM     NUTRITION HISTORY  Pt and wife report no significant changes in PO, weight, nutrition PTA.  Pt follows with outpatient dietitian for diabetes management.   Pt also seen by outpatient transplant dietitian in December 2023 for transplant work up including education on nutrition recommendations post transplant. RD also discussed recommendation to increase protein intake and limit carbohydrates to appropriate timing based on insulin regimen based on patient diet recall (see RD note 12/19/23 for details).     CURRENT NUTRITION ORDERS  Diet:  Moderate Consistent Carbohydrate (60 g CHO per meal) and 2 g Sodium    Intake/Tolerance:  PO improving since admission. Intakes % past two days, was 25-50% earlier this  "admission.    LABS   02/25/24 01:22 02/25/24 04:40 02/26/24 03:43 02/26/24 09:45 02/27/24 05:58 02/28/24 05:41   Sodium  127 (L) 130 (L)  129 (L) 130 (L)   Potassium  4.5 3.8  3.8 3.5   Chloride  97 (L) 100  99 101   Carbon Dioxide (CO2)  21 (L) 22  22 22   Urea Nitrogen  43.2 (H) 41.6 (H)  41.1 (H) 36.3 (H)   Creatinine  1.64 (H) 1.48 (H)  1.37 (H) 1.24 (H)   GFR Estimate  50 (L) 57 (L)  62 70   Calcium  8.2 (L) 8.6  8.9 9.1   Anion Gap  9 8  8 7   Magnesium  1.9       Phosphorus  4.4       Albumin  2.5 (L) 2.4 (L)  2.4 (L) 2.4 (L)   Protein Total  6.0 (L) 5.9 (L)  6.2 (L) 5.8 (L)   Alkaline Phosphatase  170 (H) 190 (H)  188 (H) 191 (H)   ALT  23 36  43 46   AST  63 (H) 92 (H)  94 (H) 92 (H)   Ammonia    46     Bilirubin Total  9.3 (H) 8.4 (H)  8.8 (H) 8.6 (H)   Glucose  334 (H) 130 (H)  289 (H) 178 (H)   Hemoglobin A1C 4.8           03/12/22 10:07 08/23/23 10:18 10/31/23 11:37 12/19/23 07:58 02/25/24 01:22   Hemoglobin A1C 5.7 (H) 5.1 8.1 (H) 7.7 (H) 4.8       MEDICATIONS  Folic acid 1 mg; thiamine 250 mg IV until 3/02 (then decreases to 100 mg enteral)  Novolog insulin: 14 units and sliding scale TID before meals; sliding scale at HS. Lantus pen  Lactulose TID    ANTHROPOMETRICS  Height: 172.7 cm (5' 8\")  Most Recent Weight: 110.4 kg (243 lb 6.2 oz)    IBW: 63.6 kg   Body mass index is 37.01 kg/m . BMI Category: Obesity Grade II BMI 35-39.9  Weight History:  16.9 kg (14%) weight loss over 6 months.  Wt Readings from Last 20 Encounters:   02/27/24 110.4 kg (243 lb 6.2 oz)   02/14/24 95.3 kg (210 lb)   02/06/24 98.4 kg (217 lb)   01/30/24 98.8 kg (217 lb 12.8 oz)   01/26/24 101.2 kg (223 lb)   12/29/23 93.4 kg (206 lb)   12/26/23 93.6 kg (206 lb 4.8 oz)   12/22/23 100.7 kg (222 lb)   12/19/23 104.7 kg (230 lb 12.8 oz)   12/01/23 95.3 kg (210 lb)   11/28/23 95.3 kg (210 lb)   11/22/23 93.4 kg (206 lb)   11/09/23 92.5 kg (204 lb)   10/16/23 99.8 kg (220 lb)   09/14/23 94 kg (207 lb 3.2 oz)   08/18/23 113.2 kg (249 lb " 8 oz)   08/10/23 119.3 kg (263 lb)   08/03/23 117.8 kg (259 lb 12.8 oz)   07/25/23 107.1 kg (236 lb 3.2 oz)   06/25/23 103.4 kg (228 lb)     Since admission:  02/27/24 0500 110.4 kg (243 lb 6.2 oz) --   02/26/24 0000 109.8 kg (242 lb 1 oz) Bed scale   02/25/24 0400 106.8 kg (235 lb 7.2 oz) Bed scale   02/24/24 0400 106.2 kg (234 lb 2.1 oz) Bed scale   02/23/24 1400 102.4 kg (225 lb 12.8 oz) Bed scale       ASSESSED NUTRITION NEEDS  Dosing Weight: 73.3 kg (Adjusted BW based on adm wt of 102.4 kg and IBW 63.6 kg)   Estimated Energy Needs: 3831-6126 kcals/day (30 - 35 kcals/kg )  Justification: Increased needs (cirrhosis) and Obese  Estimated Protein Needs: 110-147 grams protein/day (1.5 - 2 grams of pro/kg)  Justification: Increased needs (cirrhosis) and Obese  Estimated Fluid Needs: 9872-5433 mL/day (1 mL/kcal)   Justification: Maintenance    PHYSICAL FINDINGS  See malnutrition section below.    Kranthi Score: 20  Per EMR: Skin  Skin WDL: .WDL except  Skin Color: yellow, redness blanchable, petechiae  Redness blanchable location: Sacrum/Coccyx  Skin Temperature: warm  Skin Moisture: dry  Skin Elasticity: quick return to original state  Skin Integrity: petechiae, bruised (ecchymotic)  Bruised (ecchymotic) location: Other (Comment) (scattered)  Petechiae: Chest  Device Skin Interventions Taken: No adjustments needed    MALNUTRITION  % Intake: Decreased intake does not meet criteria  % Weight Loss: > 10% in 6 months (severe malnutrition)  Subcutaneous Fat Loss: Facial region:  mild and Thoracic/intercostal:  mild   Muscle Loss: Temporal:  mild and Thoracic region (clavicle, acromium bone, deltoid, trapezius, pectoral):  mild  Fluid Accumulation/Edema: Moderate  Malnutrition Diagnosis: Moderate malnutrition in the context of chronic illness/disease    NUTRITION DIAGNOSIS  Increased nutrient needs  related to liver cirrhosis as evidenced by increased calorie and protein needs.       INTERVENTIONS  Implementation  Nutrition  Education: will be provided if nutrition education needs arise.    Medical food supplement therapy     Goals  Patient to consume % of nutritionally adequate meal trays TID, or the equivalent with supplements/snacks.        Monitoring/Evaluation  Progress toward goals will be monitored and evaluated per protocol.    Luz Maria Diaz RDN, LD    4C ([facility]) RD pager: [phone]  Hilda Moncada  Clinical Dietitian  Weekend/Holiday RD pager: [phone]

## 2024-02-28 NOTE — PLAN OF CARE
Goal Outcome Evaluation:      Plan of Care Reviewed With: patient, family    Overall Patient Progress: improvingOverall Patient Progress: improving    Outcome Evaluation: see RD note 2/28    Luz Maria Diaz RDN, LD    4C ([facility]) RD pager: [phone]  Hilda Moncada  Clinical Dietitian  Weekend/Holiday RD pager: [phone]

## 2024-02-28 NOTE — PROGRESS NOTES
Major Shift Events:  No acute changes. A&Ox4, forgetful at times. Able to make needs known. Denied pain. Slept between cares. Afebrile. VSS on room air. MAP goal > 65. Up with SBA to bathroom. Loose/watery BM x1. PIV x1.       Plan: Transfer to med/surg when bed available. Continue to follow POC and notify team of any changes.       For vital signs and complete assessments, please see documentation flowsheets.

## 2024-02-28 NOTE — PLAN OF CARE
Transferred to: 7C at 1200  Status at time of transfer: VSS, on RA, SR-ST, MAP>65, 1 PIV SL, SBA   Belongings: sent w pt  Moore removed? (if no, why?): N/A  Chart and medications: sent w pt  Family notified: wife at bedside

## 2024-02-28 NOTE — PROGRESS NOTES
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 Initial blood cultures with strep bovis.       RECOMMENDATIONS:    Updates for 02/28/2024 :  - continue abx for 14 days  - active for liver transplant  - spironolactone 50 mg daily   - Daily MELD labs- CMP, INR, CBC      #Strep bovis bacteremia  - Blood cultures with strep bovis 2/2 bottles on 2/24. Follow up cultures negative. Started on abx 2/23 with cefepime. Now on ceftriaxone. Tx ID following. Per Dr. Conley, no clinical indication for ANEL.   - TTE 2/23 does not have evidence of vegetation.   - Colonoscopy 1/2/24 without evidence of malignancy    # ANGEL  - improving creatinine to 1.24  - recommend spironolactone 50 mg daily for fluid retention.     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 Now active for transplant with MELD 34. Did have recent COVID + (2/15), on admit was negative.   - US abd 12/14 without HCC    # Hepatic encephalopathy  - mentation continues to improve. Continue lactulose and rifaximin, titrate for ~3 BM's per day.     # Ascites  - small ascites. No safe pocket for para.   - as above, spironolactone 50 mg daily  - does have anasarca, improving with lymphedema therapy     # EV  - EGD with gr I EV and GOV2 on 7/26/23. No current sign of GIB.    # Debility  - continue PT/OT for strengthening.     Patient was discussed with attending physician, Dr. Liana Roldan.  The above note reflects our joint plan.     Next follow up  "appointment: Dr. Muniz 4/16/24    Thank you for the opportunity to be involved with  [facility]. Please call with any questions or concerns.    Marcella Betancourt APRN, CNP  Inpatient Hepatology [facility]          Subjective    Feeling improved and has been able to walk stairs with PT today and up from chair without assistance. No fevers, abdominal pain.         Objective    VS: /55 (BP Location: Right arm)   Pulse 80   Temp 97.1  F (36.2  C) (Axillary)   Resp 16   Ht 1.727 m (5' 8\")   Wt 110.4 kg (243 lb 6.2 oz)   SpO2 100%   BMI 37.01 kg/m       Gross per 24 hour   Intake 940 ml   Output 1400 ml   Net -460 ml     General: In no acute distress, mild facial muscle wasting  Neuro: AOx3, No asterixis  HEENT:  Noscleral icterus, Nooral lesions  CV: . Skin warm and dry  Lungs:  Respirations even and nonlabored on room air  Abd:  Nontender, nondistended   Extrem:  +1 lower  peripheral edema, wrapped  Skin:  mild jaundice  Psych: pleasant    MEDICATIONS:  Current Facility-Administered Medications   Medication    acetaminophen (TYLENOL) tablet 325 mg    cefTRIAXone (ROCEPHIN) 2 g vial to attach to  ml bag for ADULTS or NS 50 ml bag for PEDS    dextrose 10% infusion    glucose gel 15-30 g    Or    dextrose 50 % injection 25-50 mL    Or    glucagon injection 1 mg    glucose gel 15-30 g    Or    dextrose 50 % injection 25-50 mL    Or    glucagon injection 1 mg    diclofenac (VOLTAREN) 1 % topical gel 4 g    folic acid (FOLVITE) tablet 1 mg    insulin aspart (NovoLOG) injection (RAPID ACTING)    insulin aspart (NovoLOG) injection (RAPID ACTING)    insulin aspart (NovoLOG) injection (RAPID ACTING)    insulin glargine (LANTUS PEN) injection 20 Units    lactulose (CHRONULAC) solution 20 g    melatonin tablet 10 mg    midodrine (PROAMATINE) tablet 10 mg    ondansetron (ZOFRAN ODT) ODT tab 4 mg    Or    ondansetron (ZOFRAN) injection 4 mg    pantoprazole (PROTONIX) EC tablet 40 mg    rifaximin (XIFAXAN) " tablet 550 mg    sodium chloride 0.9% BOLUS 1-250 mL    spironolactone (ALDACTONE) tablet 50 mg    thiamine (B-1) injection 250 mg    Followed by    [START ON 3/2/2024] thiamine (B-1) tablet 100 mg       REVIEW OF LABORATORY, PATHOLOGY AND IMAGING RESULTS:  BMP  Recent Labs   Lab 02/28/24  1405 02/28/24  1136 02/28/24  0739 02/28/24  0541 02/27/24  0809 02/27/24  0558 02/26/24  0433 02/26/24  0343 02/25/24  0441 02/25/24  0440   NA  --   --   --  130*  --  129*  --  130*  --  127*   POTASSIUM  --   --   --  3.5  --  3.8  --  3.8  --  4.5   CHLORIDE  --   --   --  101  --  99  --  100  --  97*   MEG  --   --   --  9.1  --  8.9  --  8.6  --  8.2*   CO2  --   --   --  22  --  22  --  22  --  21*   BUN  --   --   --  36.3*  --  41.1*  --  41.6*  --  43.2*   CR  --   --   --  1.24*  --  1.37*  --  1.48*  --  1.64*   * 105* 160* 178*   < > 289*   < > 130*   < > 334*    < > = values in this interval not displayed.     CBC  Recent Labs   Lab 02/28/24  0541 02/27/24  0558 02/26/24  0343 02/25/24  0122   WBC 15.7* 17.1* 22.4* 20.1*   RBC 2.38* 2.60* 2.33* 2.42*   HGB 7.8* 8.5* 7.6* 7.9*   HCT 22.8* 25.1* 22.2* 22.9*   MCV 96 97 95 95   MCH 32.8 32.7 32.6 32.6   MCHC 34.2 33.9 34.2 34.5   RDW 17.0* 16.9* 16.6* 16.2*   PLT 59* 58* 58* 66*     INR  Recent Labs   Lab 02/28/24  0541 02/27/24  0558 02/26/24  0343 02/25/24  0852   INR 3.36* 3.28* 3.04* 2.78*     LFTs  Recent Labs   Lab 02/28/24  0541 02/27/24  0558 02/26/24  0343 02/25/24  0440   ALKPHOS 191* 188* 190* 170*   AST 92* 94* 92* 63*   ALT 46 43 36 23   BILITOTAL 8.6* 8.8* 8.4* 9.3*   PROTTOTAL 5.8* 6.2* 5.9* 6.0*   ALBUMIN 2.4* 2.4* 2.4* 2.5*        MELD 3.0: 33 at 2/28/2024  5:41 AM  MELD-Na: 32 at 2/28/2024  5:41 AM  Calculated from:  Serum Creatinine: 1.24 mg/dL at 2/28/2024  5:41 AM  Serum Sodium: 130 mmol/L at 2/28/2024  5:41 AM  Total Bilirubin: 8.6 mg/dL at 2/28/2024  5:41 AM  Serum Albumin: 2.4 g/dL at 2/28/2024  5:41 AM  INR(ratio): 3.36 at 2/28/2024   5:41 AM  Age at listin years  Sex: Male at 2024  5:41 AM    Previous work-up:   Lab Results   Component Value Date    HEPBANG Nonreactive 02/15/2024    HBCAB Nonreactive 02/15/2024    AUSAB <3.50 02/15/2024    HCVAB Nonreactive 02/15/2024    KE 2,948 (H) 2023    IRONSAT  2023      Comment:      Unable to calculate:  UIBC or Iron value is outside detectable level.    TTG 2.2 2023    TTGG 2.0 2023     (H) 2023    GEORGE Negative 2023    SMOOTHMUS 70 (H) 2023    H4HJIQW Whole Blood 2023    A1A 97 2023    G5KBTAP Negative 2023    N0ARBJQ Heterozygous (A) 2023    I5FNGUOF Not Applicable 2023    P2LEKRTPM See Note 2023    MITM2 1.2 2023    TSH 2.18 2022    CHOL 203 (H) 2022    HDL 71 2022     (H) 2022    TRIG 151 (H) 2022    A1C 4.8 2024    POPETH <10 2024    PLPETH <10 2024      Lab Results   Component Value Date    SPECDES  2023     Blood: ACD      LDRESULTS  2023     HEMOCHROMATOSIS RESULTS    HFE Gene C282Y (G845A) RESULTS:    C282Y Mutation Interpretation: HETEROZYGOTE    HFE Gene H63D (C187G) RESULTS:    H63D Mutation Interpretation: NORMAL    HFE Gene S65C (A193T) RESULTS:    S65C Mutation Interpretation: NORMAL           Imaging Results:  None current

## 2024-02-28 NOTE — PROGRESS NOTES
ICU End of Shift Summary. See flowsheets for vital signs and detailed assessment.    Changes this shift: No acute changes. A & Ox4, forgetful at times. Denies pain. Stand by assist, up to chair and walked in hallway x3. Vitally stable, MAP >65.     Plan:  Waiting for bed on med/ surg. Notify care team of any changes.

## 2024-02-28 NOTE — PROGRESS NOTES
[facility]    Progress Note - Medicine Service, [facility] TEAM 1       Date of Admission:  2/23/2024    Assessment & Plan   Mary Lopez is a 53 year old male admitted on 2/23/2024 with a PMH of alcoholic cirrhosis (sober since 6/25/2023) c/b ascites, hepatic encephalopathy, grade 1 esophageal varices, transplant candidate (although inactive on list due to acute illness), and T2DM who was admitted to the ICU after presenting to the ED febrile, hypotensive, and encephalopathic. Found to have strep bovis bacteremia likely 2/2 to SBP/GI source and septic shock requiring minimal pressors, now off pressors with stable blood pressures.     Updates Today  - continue ceftriaxone  - start spironolactone 50 mg once daily per Hepatology  - leukocytosis continues to improve, blood cultures clear  - continue insulin regimen as prior  - discontinued stress dose steroids  - re-listed on liver tranpslant list    #Streptococcus galloylyticus (bovis) bacteremia likely 2/2 GI/SBP source (gut translocation)  #Leukocytosis, improving  #Septic Shock, resolved  Patient presented to the ED on 2/23/2024 with fevers, tachycardia, hypotension, leukocytosis, and encephalopathy. Patient given fluids, but eventually started on levophed for concerns of septic shock. Blood culture grew streptococcus galloylyticus (bovis). MRSA swab and UA negative. Source suspected to be 2/2 SBP or GI source with translocation. Patient with history of ascites and suspected SBP with last hospitalization. Unable to complete paracentesis on this admission, due to pocket being too small to complete procedure. Echo from 2/24/24 showed no concern for vegetation. Colonoscopy from 1/2/24 did not show any signs of malignancy. Patient weaned off of levophed in the ED, started on midodrine and stress dose steroids. Steroids have been slowly weaned. Blood pressure has remained stable. Does have increased leukocytosis,  but notably on high dose stress steroids that are being weaned. Leukocytosis continues to improve today and blood cultures have remained clear.  -Transplant ID following, appreciate recs   > continue ceftriaxone 2 g Q24H through 3/8/2024 to complete 14 day course (from 2/24/24)   > From ID prospective, can be listed for liver transplant at any point   > ANEL is not needed (given rapid bacteremia clearance)   > monitor CBC   > gave first dose of TwinRix 2/28/2024  - continue midodrine 10 mg Q8H  - discontinued stress dose steroids  - MAP goal >65     Antimicrobials:  -Cefepime (02/23-02/25)  -Vancomycin (02/23-02/25)     Cultures:  - 2/26 Bcx: pending   - 2/25 Bcx: NGTD  - 2/24 Bcx: NGTD  - 2/23 Bcx 2/2 bottles: Streptococcus gallolyticus (Streptococcus bovis group)  - 2/23 verigene: no organism detected      #Decompensated cirrhosis 2/2 Alcohol Cirrhosis MELD 3.0-34   #Alpha-1 antitrypsin MZ heterozygote, C282Y heterozygote  #Hepatic Encephalopathy  #Grade I Esophageal Varices, no Hx of variceal bleed  Patient initially presented on 2/15 for liver transplant, but found to be COVID+ and donor liver transplant was ineligible. Most recent EGD in July 2023 found to have G1 EV/GOV Type1 with no hx of bleeding. Patient with history of suspected SBP in last admission (1/2024), but did not have pocket to complete paracentesis. Patient currently on PTA lactulose and rifaximin for hepatic encephalopathy. Patient liver transplant candidate, currently inactive due to acute illness but per ID patient would be able to eligible once again  now that bacteremia has improved. Patient given albumin on 2/24 per GI recs. Patient with noted history of hepatic encephalopathy, but patient's mental status seems to be at baseline now s/p antibiotics. Patient re-listed for transplant candidacy per Hepatology.   - Hepatology Consulted, appreciate recs  - continue pantoprazole 40 mg BID  - continue PTA Lactulose 20 g TID (goal of 3-4 BM per  day)  - continue rifaximin 550mg BID  - continue PTA folate and high dose thiamine  - MELD labs daily  - restarted PTA spironolactone 25 mg daily per Hepatology to help with edema  - hold PTA 30 mg torsemide  - lymphedema consult placed    MELD 3.0: 33 at 2024  5:41 AM  MELD-Na: 32 at 2024  5:41 AM  Calculated from:  Serum Creatinine: 1.24 mg/dL at 2024  5:41 AM  Serum Sodium: 130 mmol/L at 2024  5:41 AM  Total Bilirubin: 8.6 mg/dL at 2024  5:41 AM  Serum Albumin: 2.4 g/dL at 2024  5:41 AM  INR(ratio): 3.36 at 2024  5:41 AM  Age at listin years  Sex: Male at 2024  5:41 AM    #Acute on Chronic Metabolic Encephalopathy (HE vs Sepsis), improving  #c/f delirium  Patient with decompensated cirrhosis complicated by history of hepatic encephalopathy, who presented with altered mental status. Likely compounded by sepsis and elevated ammonium levels. Ammonia levels decreased to 46 on  from 64 on . Today, patient's mentation seems to be at baseline per wife since . Additional concerns for delirium, as it appears that patient's confusion is worse at night.   - delirium precautions  - continue PTA lactulose and rifaximin  - increased melatonin to 10 mg at bedtime  - hold PTA doxepin 10 mg daily, but will consider restarting if blood pressures remain stable    #Acute Kidney Injury, likely shock/prerenal, improving  #hyponatremia  Creatinine elevated to 2.5 upon admission (baseline 0.6-1.2). Upon transfer to medicine floor, improved to 1.48. Etiology thought likely due to pre-renal cause with hypotension/hypovolemia with septic shock. Received 2L NS in ED and albumin in ICU. Additionally, sodium low at 130, but has consistently been low throughout admission and improved from 123 upon admission.   - CMP daily  - holding PTA spironolactone 25 mg daily and torsemide 30 mg daily  - daily weights  - I&Os  - encourage oral intake    #Type 2 DM  #Concerning for hypoglycemic  episode  #Steroid induced hyperglycemia  Prior to admission, patient was taking 36 units Tresiba and 15 units Humalog w/breakfast and lunch. 13 units of dinner. Placed on insulin drip while in ICU due to acute illness and elevated blood sugars. Transitioned off insulin drip to glargine and aspart for meal time insulin. Per ICU team, concerns that patient was being given excess insulin at home with hypoglycemic episode, diabetes education consulted while patient here. Blood sugars have improved since admission, and no longer on steroids so will continue to monitor insulin regimen.  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 7.6 upon transfer to floor team. Notes of hematoma noted at RIJ site on 2/24. Hematoma mildly tender to touch, but hemoglobin has remained stable and patient otherwise hemodynamically stable. INR elevated to 4.8, fibrogen <60. Most likely in setting of end stage liver disease. Important to r/o DIC so factor 8 assay is obtained, did not indicate DIC.Teg results consistent hypofibrinogenemia and thrombocytopenia vs platelet dysfunction. Has received various blood products to maintain lab values (Vitamin K x2, 3 units pRBC, 2 units cryo, 1 unit platelets) while in ICU.   - Transfuse to keep hemoglobin >7, platelet >20K, and fibrinogen >100K   - CBC daily  - continue to monitor hematoma for worsening bleeding, but otherwise stable    #Alcohol Use Dependence, in remission  Sober since 06/25/2023.      #Hyperkalemia, resolved  K+ elevated to 5.7 (02/24) improved to 3.8 (02/26) s/p lokelma x1.  -Trend BMP  "daily      #R shoulder tear-chronic  Pt had a fall in October and now has pain in shoulder. Was told not a candidate for surgery, recently started hydrocodone on 2/19.  - hold PTA hydrocodone   - Voltaren gel     #Moderate malnutrition in the context of chronic illness/disease   - Nutrition Consulted        Diet: Combination Diet Moderate Consistent Carb (60 g CHO per Meal) Diet; 2 gm NA Diet    DVT Prophylaxis: Pneumatic Compression Devices  Moore Catheter: Not present  Fluids: None  Lines: None       Cardiac Monitoring: ACTIVE order. Indication: ICU  Code Status: Full Code      Clinically Significant Risk Factors         # Hyponatremia: Lowest Na = 129 mmol/L in last 2 days, will monitor as appropriate   # Hypercalcemia: corrected calcium is >10.1, will monitor as appropriate    # Hypoalbuminemia: Lowest albumin = 2.4 g/dL at 2/28/2024  5:41 AM, will monitor as appropriate    # Coagulation Defect: INR = 3.36 (Ref range: 0.85 - 1.15) and/or PTT = 67 Seconds (Ref range: 22 - 38 Seconds), will monitor for bleeding  # Thrombocytopenia: Lowest platelets = 58 in last 2 days, will monitor for bleeding   # Hypertension: Noted on problem list        # Obesity: Estimated body mass index is 37.01 kg/m  as calculated from the following:    Height as of this encounter: 1.727 m (5' 8\").    Weight as of this encounter: 110.4 kg (243 lb 6.2 oz).             Disposition Plan         The patient's care was discussed with the Attending Physician, Dr. Garcia .    TALA WILKINSON MD  Medicine Service, [facility] TEAM [address] [place] [address]  Securely message with [employer] (more info)  Text page via [facility] Paging/Directory   See signed in provider for up to date coverage information  ______________________________________________________________________    Interval History   Nursing notes reviewed, no acute events overnight.  Patient notes that he is feeling overall very well.  Denies any fevers chills, " chest pain, shortness of breath, or other concerns. Notified by Hepatology that patient will be placed back on the liver transplant list.  Otherwise feeling fine overall, no questions or concerns.    Physical Exam   Vital Signs: Temp: 97.1  F (36.2  C) Temp src: Axillary BP: 103/55 Pulse: 80   Resp: 16 SpO2: 100 % O2 Device: None (Room air)    Weight: 243 lbs 6.21 oz    General: NAD, sitting comfortably in chair next to bed   HEENT: EOMI, PERRLA, atraumatic, scleral icterus noted, hematoma noted on RIJ site, slightly tender to touch   CV: RRR, no murmur noted  Lungs: clear to auscultation bilaterally, no wheezes or crackles noted, breathing non-labored on room air  Abdomen: soft, non-tender to palpation, non-distended, bowel sounds active  Neuro: alert and oriented to person, time. Not place, but noted to be baseline according to wife. Conversationally tangential, but he notes that he overall feels well. No asterixis or clonus.  No focal deficits noted  Skin: overall appears jaundiced  Extremity: 3+ pitting edema in bilateral lower extremities    Medical Decision Making       Please see A&P for additional details of medical decision making.      Data     I have personally reviewed the following data over the past 24 hrs:    15.7 (H)  \   7.8 (L)   / 59 (L)     130 (L) 101 36.3 (H) /  105 (H)   3.5 22 1.24 (H) \     ALT: 46 AST: 92 (H) AP: 191 (H) TBILI: 8.6 (H)   ALB: 2.4 (L) TOT PROTEIN: 5.8 (L) LIPASE: N/A     INR:  3.36 (H) PTT:  N/A   D-dimer:  N/A Fibrinogen:  N/A       Imaging results reviewed over the past 24 hrs:   Recent Results (from the past 24 hour(s))   US Abd Hepatic & Portal Vasculature Cmpl    Narrative    EXAMINATION: US ABD HEPATIC & PORTAL VASCULATURE CMPL 2/27/2024  12:39 PM     COMPARISON: CT abdomen and pelvis without contrast 2/23/2024.  Ultrasound abdomen with Doppler 12/14/2022.    HISTORY: Pretransplant evaluation.    TECHNIQUE: The abdomen was scanned in standard fashion with  specialized  ultrasound transducer(s) using both gray-scale, color  Doppler, and spectral flow techniques. Doppler study only requested.    Findings:  Liver: Survey images show morphologic changes of cirrhosis, not fully  evaluated as a Doppler study only was ordered.    Doppler:   Extrahepatic portal vein flow is retrograde at 14 cm/s.  Right portal vein flow is retrograde, measuring 12 cm/s.  Left portal vein flow is antegrade, measuring 19 cm/s. Left portal  vein flows into a large recanalized para umbilical vein with velocity  of 34 cm/s.    Flow in the hepatic artery is towards the liver and:  125 cm/s peak systolic  0.67 resistive index.     The splenic vein is obscured, not visualized.  The left, middle, and  right hepatic veins are patent with flow towards the IVC. The IVC is  patent with flow towards the heart. IVC is distended at 3.2 cm.   The  visualized aorta is not dilated measuring 2.6 cm.    Fluid: Small volume ascites as seen on CT      Impression    Impression:   1.  Reversal of flow in the right and main portal vein consistent with  portal venous hypertension. No thrombus identified. Left portal vein  is antegrade presumably flowing into the large recanalized para  umbilical vein also consistent portal venous hypertension.  2.  Cirrhosis, not fully evaluated as a Doppler study only was  performed.  3.  Ascites.    ALFRED WARE MD         SYSTEM ID:  [id number]

## 2024-02-29 ENCOUNTER — APPOINTMENT (OUTPATIENT)
Dept: OCCUPATIONAL THERAPY | Facility: CLINIC | Age: 54
DRG: 871 | End: 2024-02-29
Payer: COMMERCIAL

## 2024-02-29 LAB
ALBUMIN SERPL BCG-MCNC: 2.3 G/DL (ref 3.5–5.2)
ALP SERPL-CCNC: 194 U/L (ref 40–150)
ALT SERPL W P-5'-P-CCNC: 51 U/L (ref 0–70)
ANION GAP SERPL CALCULATED.3IONS-SCNC: 7 MMOL/L (ref 7–15)
AST SERPL W P-5'-P-CCNC: 104 U/L (ref 0–45)
BACTERIA BLD CULT: NO GROWTH
BACTERIA BLD CULT: NO GROWTH
BILIRUB SERPL-MCNC: 8.2 MG/DL
BUN SERPL-MCNC: 29.8 MG/DL (ref 6–20)
CALCIUM SERPL-MCNC: 9 MG/DL (ref 8.6–10)
CHLORIDE SERPL-SCNC: 102 MMOL/L (ref 98–107)
CREAT SERPL-MCNC: 1.16 MG/DL (ref 0.67–1.17)
DEPRECATED HCO3 PLAS-SCNC: 23 MMOL/L (ref 22–29)
EGFRCR SERPLBLD CKD-EPI 2021: 76 ML/MIN/1.73M2
ERYTHROCYTE [DISTWIDTH] IN BLOOD BY AUTOMATED COUNT: 17.3 % (ref 10–15)
GLUCOSE BLDC GLUCOMTR-MCNC: 110 MG/DL (ref 70–99)
GLUCOSE BLDC GLUCOMTR-MCNC: 114 MG/DL (ref 70–99)
GLUCOSE BLDC GLUCOMTR-MCNC: 116 MG/DL (ref 70–99)
GLUCOSE BLDC GLUCOMTR-MCNC: 148 MG/DL (ref 70–99)
GLUCOSE BLDC GLUCOMTR-MCNC: 159 MG/DL (ref 70–99)
GLUCOSE BLDC GLUCOMTR-MCNC: 164 MG/DL (ref 70–99)
GLUCOSE BLDC GLUCOMTR-MCNC: 182 MG/DL (ref 70–99)
GLUCOSE BLDC GLUCOMTR-MCNC: 206 MG/DL (ref 70–99)
GLUCOSE BLDC GLUCOMTR-MCNC: 213 MG/DL (ref 70–99)
GLUCOSE BLDC GLUCOMTR-MCNC: 67 MG/DL (ref 70–99)
GLUCOSE BLDC GLUCOMTR-MCNC: 81 MG/DL (ref 70–99)
GLUCOSE BLDC GLUCOMTR-MCNC: 96 MG/DL (ref 70–99)
GLUCOSE SERPL-MCNC: 122 MG/DL (ref 70–99)
HCT VFR BLD AUTO: 22.4 % (ref 40–53)
HGB BLD-MCNC: 7.7 G/DL (ref 13.3–17.7)
INR PPP: 3.3 (ref 0.85–1.15)
MCH RBC QN AUTO: 34.1 PG (ref 26.5–33)
MCHC RBC AUTO-ENTMCNC: 34.4 G/DL (ref 31.5–36.5)
MCV RBC AUTO: 99 FL (ref 78–100)
PLATELET # BLD AUTO: 59 10E3/UL (ref 150–450)
POTASSIUM SERPL-SCNC: 3.6 MMOL/L (ref 3.4–5.3)
PROT SERPL-MCNC: 5.6 G/DL (ref 6.4–8.3)
RBC # BLD AUTO: 2.26 10E6/UL (ref 4.4–5.9)
SODIUM SERPL-SCNC: 132 MMOL/L (ref 135–145)
WBC # BLD AUTO: 13.4 10E3/UL (ref 4–11)

## 2024-02-29 PROCEDURE — 85027 COMPLETE CBC AUTOMATED: CPT

## 2024-02-29 PROCEDURE — 250N000013 HC RX MED GY IP 250 OP 250 PS 637

## 2024-02-29 PROCEDURE — 99233 SBSQ HOSP IP/OBS HIGH 50: CPT | Performed by: INTERNAL MEDICINE

## 2024-02-29 PROCEDURE — 99232 SBSQ HOSP IP/OBS MODERATE 35: CPT | Performed by: STUDENT IN AN ORGANIZED HEALTH CARE EDUCATION/TRAINING PROGRAM

## 2024-02-29 PROCEDURE — 250N000013 HC RX MED GY IP 250 OP 250 PS 637: Performed by: STUDENT IN AN ORGANIZED HEALTH CARE EDUCATION/TRAINING PROGRAM

## 2024-02-29 PROCEDURE — 120N000002 HC R&B MED SURG/OB UMMC

## 2024-02-29 PROCEDURE — 85610 PROTHROMBIN TIME: CPT

## 2024-02-29 PROCEDURE — 99232 SBSQ HOSP IP/OBS MODERATE 35: CPT | Mod: GC | Performed by: INTERNAL MEDICINE

## 2024-02-29 PROCEDURE — 250N000011 HC RX IP 250 OP 636

## 2024-02-29 PROCEDURE — 99233 SBSQ HOSP IP/OBS HIGH 50: CPT | Mod: GC | Performed by: INTERNAL MEDICINE

## 2024-02-29 PROCEDURE — 36415 COLL VENOUS BLD VENIPUNCTURE: CPT

## 2024-02-29 PROCEDURE — 80053 COMPREHEN METABOLIC PANEL: CPT

## 2024-02-29 PROCEDURE — 97535 SELF CARE MNGMENT TRAINING: CPT | Mod: GO | Performed by: OCCUPATIONAL THERAPIST

## 2024-02-29 RX ORDER — METFORMIN HCL 500 MG
500 TABLET, EXTENDED RELEASE 24 HR ORAL
Status: DISCONTINUED | OUTPATIENT
Start: 2024-02-29 | End: 2024-02-29

## 2024-02-29 RX ADMIN — PANTOPRAZOLE SODIUM 40 MG: 40 TABLET, DELAYED RELEASE ORAL at 17:00

## 2024-02-29 RX ADMIN — MIDODRINE HYDROCHLORIDE 10 MG: 5 TABLET ORAL at 14:08

## 2024-02-29 RX ADMIN — FOLIC ACID 1 MG: 1 TABLET ORAL at 09:42

## 2024-02-29 RX ADMIN — METFORMIN ER 500 MG 500 MG: 500 TABLET ORAL at 17:00

## 2024-02-29 RX ADMIN — LACTULOSE 20 G: 20 SOLUTION ORAL at 14:08

## 2024-02-29 RX ADMIN — THIAMINE HYDROCHLORIDE 250 MG: 100 INJECTION, SOLUTION INTRAMUSCULAR; INTRAVENOUS at 09:42

## 2024-02-29 RX ADMIN — Medication 10 MG: at 21:38

## 2024-02-29 RX ADMIN — MIDODRINE HYDROCHLORIDE 10 MG: 5 TABLET ORAL at 21:38

## 2024-02-29 RX ADMIN — RIFAXIMIN 550 MG: 550 TABLET ORAL at 21:38

## 2024-02-29 RX ADMIN — LACTULOSE 20 G: 20 SOLUTION ORAL at 21:37

## 2024-02-29 RX ADMIN — MIDODRINE HYDROCHLORIDE 10 MG: 5 TABLET ORAL at 04:00

## 2024-02-29 RX ADMIN — DEXTROSE 15 G: 15 GEL ORAL at 00:10

## 2024-02-29 RX ADMIN — LACTULOSE 20 G: 20 SOLUTION ORAL at 09:42

## 2024-02-29 RX ADMIN — SPIRONOLACTONE 50 MG: 50 TABLET ORAL at 09:42

## 2024-02-29 RX ADMIN — RIFAXIMIN 550 MG: 550 TABLET ORAL at 09:42

## 2024-02-29 RX ADMIN — PANTOPRAZOLE SODIUM 40 MG: 40 TABLET, DELAYED RELEASE ORAL at 09:42

## 2024-02-29 RX ADMIN — CEFTRIAXONE SODIUM 2 G: 2 INJECTION, POWDER, FOR SOLUTION INTRAMUSCULAR; INTRAVENOUS at 17:01

## 2024-02-29 ASSESSMENT — ACTIVITIES OF DAILY LIVING (ADL)
ADLS_ACUITY_SCORE: 37
ADLS_ACUITY_SCORE: 37
ADLS_ACUITY_SCORE: 35
ADLS_ACUITY_SCORE: 37
ADLS_ACUITY_SCORE: 35
ADLS_ACUITY_SCORE: 37
ADLS_ACUITY_SCORE: 35
ADLS_ACUITY_SCORE: 38
ADLS_ACUITY_SCORE: 35
ADLS_ACUITY_SCORE: 38
ADLS_ACUITY_SCORE: 35
ADLS_ACUITY_SCORE: 37
ADLS_ACUITY_SCORE: 36
ADLS_ACUITY_SCORE: 35
ADLS_ACUITY_SCORE: 38
ADLS_ACUITY_SCORE: 35
ADLS_ACUITY_SCORE: 35
ADLS_ACUITY_SCORE: 37
ADLS_ACUITY_SCORE: 35
ADLS_ACUITY_SCORE: 37

## 2024-02-29 NOTE — PROGRESS NOTES
[facility]    Progress Note - Medicine Service, LEANNE TEAM 1       Date of Admission:  2/23/2024    Assessment & Plan   Mary Lopez is a 53 year old male admitted on 2/23/2024 with a PMH of alcoholic cirrhosis (sober since 6/25/2023) c/b ascites, hepatic encephalopathy, grade 1 esophageal varices, transplant candidate (although inactive on list due to acute illness), and T2DM who was admitted to the ICU after presenting to the ED febrile, hypotensive, and encephalopathic. Found to have strep bovis bacteremia likely 2/2 to SBP/GI source and septic shock requiring minimal pressors, now off pressors with stable blood pressures.     Updates Today  - restarted PTA metformin  - changed glargine to 28 units once daily at 1600 per endocrine  - Insulin aspart 8 U TID at meals  - Diabetes Education re-consulted  - Endocrine consulted, awaiting further recs  - will reach out to ID regarding antibiotic plan to determine home services needed    #Streptococcus galloylyticus (bovis) bacteremia likely 2/2 GI/SBP source (gut translocation)  #Leukocytosis, improving  #Septic Shock, resolved  Patient presented to the ED on 2/23/2024 with fevers, tachycardia, hypotension, leukocytosis, and encephalopathy. Patient given fluids, but eventually started on levophed for concerns of septic shock. Blood culture grew streptococcus galloylyticus (bovis). MRSA swab and UA negative. Source suspected to be 2/2 SBP or GI source with translocation. Patient with history of ascites and suspected SBP with last hospitalization. Unable to complete paracentesis on this admission, due to pocket being too small to complete procedure. Echo from 2/24/24 showed no concern for vegetation. Colonoscopy from 1/2/24 did not show any signs of malignancy. Patient weaned off of levophed in the ED, started on midodrine and stress dose steroids. Steroids have been slowly weaned. Blood pressure has remained  stable. Does have increased leukocytosis, but notably on high dose stress steroids that are being weaned. Leukocytosis continues to improve today and blood cultures have remained clear. Patient likely to discharge within next day or so, reached out to transplant ID who noted that he will likely switch to oral. Will follow up on recommendations.   -Transplant ID following, appreciate recs   > reached out to ID today, will likely switch to oral antibiotics, will follow up further on plan per ID   > continue ceftriaxone 2 g Q24H through 3/8/2024 to complete 14 day course (from 2/24/24)   > ANEL is not needed (given rapid bacteremia clearance)   > monitor CBC   > gave first dose of TwinRix 2/28/2024  - continue midodrine 10 mg Q8H  - MAP goal >65     Antimicrobials:  -Cefepime (02/23-02/25)  -Vancomycin (02/23-02/25)     Cultures:  - 2/26 Bcx: pending   - 2/25 Bcx: NGTD  - 2/24 Bcx: NGTD  - 2/23 Bcx 2/2 bottles: Streptococcus gallolyticus (Streptococcus bovis group)  - 2/23 verigene: no organism detected      #Decompensated cirrhosis 2/2 Alcohol Cirrhosis MELD 3.0-34   #Alpha-1 antitrypsin MZ heterozygote, C282Y heterozygote  #Hepatic Encephalopathy  #Grade I Esophageal Varices, no Hx of variceal bleed  Patient initially presented on 2/15 for liver transplant, but found to be COVID+ and donor liver transplant was ineligible. Most recent EGD in July 2023 found to have G1 EV/GOV Type1 with no hx of bleeding. Patient with history of suspected SBP in last admission (1/2024), but did not have pocket to complete paracentesis. Patient currently on PTA lactulose and rifaximin for hepatic encephalopathy. Patient liver transplant candidate, currently inactive due to acute illness but per ID patient would be able to eligible once again  now that bacteremia has improved. Patient given albumin on 2/24 per GI recs. Patient with noted history of hepatic encephalopathy, but patient's mental status seems to be at baseline now s/p  antibiotics. Patient re-listed for transplant candidacy per Hepatology on 2024.   - Hepatology Consulted, appreciate recs  - continue pantoprazole 40 mg BID  - continue PTA Lactulose 20 g TID (goal of 3-4 BM per day)  - continue rifaximin 550mg BID  - continue PTA folate and high dose thiamine  - MELD labs daily  - restarted PTA spironolactone 25 mg daily per Hepatology to help with edema  - hold PTA 30 mg torsemide  - lymphedema consult placed    MELD 3.0: 32 at 2024  5:54 AM  MELD-Na: 32 at 2024  5:54 AM  Calculated from:  Serum Creatinine: 1.16 mg/dL at 2024  5:54 AM  Serum Sodium: 132 mmol/L at 2024  5:54 AM  Total Bilirubin: 8.2 mg/dL at 2024  5:54 AM  Serum Albumin: 2.3 g/dL at 2024  5:54 AM  INR(ratio): 3.30 at 2024  5:54 AM  Age at listin years  Sex: Male at 2024  5:54 AM    #Acute on Chronic Metabolic Encephalopathy (HE vs Sepsis), improving  #c/f delirium  Patient with decompensated cirrhosis complicated by history of hepatic encephalopathy, who presented with altered mental status. Likely compounded by sepsis and elevated ammonium levels. Ammonia levels decreased to 46 on  from 64 on . Today, patient's mentation seems to be at baseline per wife since . Additional concerns for delirium, as it appears that patient's confusion is worse at night.   - delirium precautions  - continue PTA lactulose and rifaximin  - increased melatonin to 10 mg at bedtime  - hold PTA doxepin 10 mg daily, but will consider restarting if blood pressures remain stable    #Acute Kidney Injury, likely shock/prerenal, resolved  #hyponatremia, stable  Creatinine elevated to 2.5 upon admission (baseline 0.6-1.2). Upon transfer to medicine floor, improved to 1.48. Etiology thought likely due to pre-renal cause with hypotension/hypovolemia with septic shock. Received 2L NS in ED and albumin in ICU. Additionally, sodium low at 130, but has consistently been low throughout  admission and improved from 123 upon admission. Creatinine improved back to normal today.  - restarted PTA spironolactone 25 mg daily  - holding torsemide 30 mg daily  - CMP daily  - daily weights  - I&Os    #Type 2 DM  #Concerning for hypoglycemic episode  #Steroid induced hyperglycemia  Prior to admission, patient was taking 36 units Tresiba and 15 units Humalog w/breakfast and lunch. 13 units of dinner. Placed on insulin drip while in ICU due to acute illness and elevated blood sugars. Transitioned off insulin drip to glargine and aspart for meal time insulin. Per ICU team, concerns that patient was being given excess insulin at home with hypoglycemic episode, diabetes education consulted while patient here. Blood sugars have improved since admission, and no longer on steroids so will continue to monitor insulin regimen.  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 7.6 upon transfer to floor team. Notes of hematoma noted at RIJ site on 2/24. Hematoma mildly tender to touch, but hemoglobin has remained stable and patient otherwise hemodynamically stable. INR elevated to 4.8, fibrogen <60. Most likely in setting of end stage liver disease. Important to r/o DIC so factor 8 assay is obtained, did not indicate DIC.Teg results consistent hypofibrinogenemia and thrombocytopenia vs platelet dysfunction. Has received various blood products to maintain lab values (Vitamin K x2, 3 units pRBC, 2  "units cryo, 1 unit platelets) while in ICU.   - Transfuse to keep hemoglobin >7  - CBC daily  - continue to monitor hematoma for worsening bleeding, but otherwise stable    #Alcohol Use Dependence, in remission  Sober since 06/25/2023.      #Hyperkalemia, resolved  K+ elevated to 5.7 (02/24) improved to 3.8 (02/26) s/p lokelma x1.  -Trend BMP daily      #R shoulder tear-chronic  Pt had a fall in October and now has pain in shoulder. Was told not a candidate for surgery, recently started hydrocodone on 2/19.  - hold PTA hydrocodone   - Voltaren gel     #Moderate malnutrition in the context of chronic illness/disease   - Nutrition Consulted        Diet: Combination Diet Moderate Consistent Carb (60 g CHO per Meal) Diet; 2 gm NA Diet  Snacks/Supplements Adult: Glucerna; With Meals    DVT Prophylaxis: Pneumatic Compression Devices  Moore Catheter: Not present  Fluids: None  Lines: None       Cardiac Monitoring: None  Code Status: Full Code      Clinically Significant Risk Factors           # Hypercalcemia: corrected calcium is >10.1, will monitor as appropriate    # Hypoalbuminemia: Lowest albumin = 2.3 g/dL at 2/29/2024  5:54 AM, will monitor as appropriate    # Coagulation Defect: INR = 3.30 (Ref range: 0.85 - 1.15) and/or PTT = 67 Seconds (Ref range: 22 - 38 Seconds), will monitor for bleeding  # Thrombocytopenia: Lowest platelets = 59 in last 2 days, will monitor for bleeding   # Hypertension: Noted on problem list        # Obesity: Estimated body mass index is 36.97 kg/m  as calculated from the following:    Height as of this encounter: 1.727 m (5' 8\").    Weight as of this encounter: 110.3 kg (243 lb 2.7 oz).   # Moderate Malnutrition: based on nutrition assessment           Disposition Plan      Expected Discharge Date: 03/03/2024      Destination: home  Discharge Comments: Dispo: Home. Needs home PT services. Awaiting final ID plan and insulin plan, expect would be med ready soon.      The patient's care was " discussed with the Attending Physician, Dr. Silva .    TALA WILKINSON MD  Medicine Service, [facility] TEAM 1  [facility]  Securely message with [employer] (more info)  Text page via [employer] Paging/Directory   See signed in provider for up to date coverage information  ______________________________________________________________________    Interval History   Nursing notes reviewed. Patient with episode of hypoglycemia overnight and notes that he feels as though he was confused overnight. Wife noted this morning he seemed more confused than prior, but notes that has been consistent when he is confused during hospitalization. She notes he is back at baseline this morning. Patient denies any fevers/chills, chest pain, dyspnea, abdominal pain, or nausea/vomiting. Patient and wife asking about ability to discharge home.     Physical Exam   Vital Signs: Temp: 97.9  F (36.6  C) Temp src: Oral BP: 98/49 Pulse: 59   Resp: 15 SpO2: 94 % O2 Device: None (Room air)    Weight: 243 lbs 2.68 oz    General: NAD, sitting comfortably in chair next to bed   HEENT: EOMI, PERRLA, atraumatic, scleral icterus noted, hematoma noted on RIJ site, slightly tender to touch   CV: RRR, no murmur noted  Lungs: clear to auscultation bilaterally, no wheezes or crackles noted, breathing non-labored on room air  Abdomen: soft, non-tender to palpation, non-distended, bowel sounds active  Neuro: alert and oriented to person, time. Not place, but noted to be baseline according to wife. Conversationally tangential, but he notes that he overall feels well. No asterixis or clonus.  No focal deficits noted  Skin: overall appears jaundiced  Extremity: 2+ pitting edema in bilateral lower extremities    Medical Decision Making       Please see A&P for additional details of medical decision making.      Data     I have personally reviewed the following data over the past 24 hrs:    13.4 (H)  \   7.7 (L)   / 59 (L)      132 (L) 102 29.8 (H) /  148 (H)   3.6 23 1.16 \     ALT: 51 AST: 104 (H) AP: 194 (H) TBILI: 8.2 (H)   ALB: 2.3 (L) TOT PROTEIN: 5.6 (L) LIPASE: N/A     INR:  3.30 (H) PTT:  N/A   D-dimer:  N/A Fibrinogen:  N/A       Imaging results reviewed over the past 24 hrs:   No results found for this or any previous visit (from the past 24 hour(s)).

## 2024-02-29 NOTE — CONSULTS
Discharge Pharmacy Test Claim    Glucagon emergency kits and gvoke hypopens are covered with $0 copay through patient's commercial [employer] plan.    Test Claim Copay   baqsimi not covered   glucagon kit 0.00   gvoke 0.00     Evelyn Heard  [facility] Pharmacy Liaison  Ph: [phone]  Fax [phone]  Available on [employer] (learn more here)

## 2024-02-29 NOTE — PLAN OF CARE
"  Time: 0561-7522    Admission/Diagnosis:  ANGEL (acute kidney injury) (H24) [N17.9]  Acute liver failure without hepatic coma [K72.00]  Sepsis with acute liver failure and septic shock without hepatic coma, due to unspecified organism (H) [A41.9, R65.21, K72.00]    BP 96/46 (BP Location: Left arm, Patient Position: Semi-Bob's, Cuff Size: Adult Large)   Pulse 76   Temp 97.4  F (36.3  C) (Oral)   Resp 16   Ht 1.727 m (5' 8\")   Wt 110.3 kg (243 lb 2.7 oz)   SpO2 95%   BMI 36.97 kg/m      A+Ox4. Mental status at baseline per wife.  Forgetful at times. VSS on RA except Soft BP's. Denies N/V, SOB, and chest pain. Tolerating regular diet with poor intake. Patient was tired mostly during shift due to lack of sleep last night. Resting promoted between cares. Voiding and stooling adequately. BG WNL.  BA on. Up ad amy with assist to the bathroom and steady with his feet.  Wife at bedside. Questions encouraged and answered. She is involved with cares and is helpful.  Patient fatigued improved after taking a nap. No acute concerns.     Shift Update:   Lymphedema consultant came and saw the patient this morning and suggested not to re-wrap his legs. Elevated legs and heels with pillow and have been keeping them elevated throughout the shift per lymphedema RN.     Plan:     Awaiting for oral abx for possible discharge     Continue  with current POC.      Goal Outcome Evaluation:      Plan of Care Reviewed With: patient, spouse    Overall Patient Progress: no changeOverall Patient Progress: no change           "

## 2024-02-29 NOTE — PLAN OF CARE
"Goal Outcome Evaluation:         Blood pressure 105/52, pulse 80, temperature 97.5  F (36.4  C), temperature source Oral, resp. rate 14, height 1.727 m (5' 8\"), weight 110.4 kg (243 lb 6.2 oz), SpO2 96%.    AVSS, A/O x 3 on room air with 02 saturation >92 %. No respiratory issues, No SOB and no Chest pain. Pt was able to call for bathroom needs. She is voiding freely into the toilet. LE edema and he has Lymphedema wraps on. He is jaundiced, scleral yellow. He was hypoglycemic, 54 at midnight and was given some Dole diced peaches at bedside and also had Glutose 15 gel with recheck on glucose 61 recheck was 81 at 00:09 AM. Pt was given orange and apple juices with blood glucose getting to 116 and 15 minute recheck was 110 and 96 Will continue to check Blood glucose. PIV saline locked. Pt has bed alarm on On transplant list. Up with stand by  assist, Continue to monitor pt and follow plan of care.      Problem: Adult Inpatient Plan of Care  Goal: Plan of Care Review  Description: The Plan of Care Review/Shift note should be completed every shift.  The Outcome Evaluation is a brief statement about your assessment that the patient is improving, declining, or no change.  This information will be displayed automatically on your shift  note.  Outcome: Progressing  Goal: Patient-Specific Goal (Individualized)  Description: You can add care plan individualizations to a care plan. Examples of Individualization might be:  \"Parent requests to be called daily at 9am for status\", \"I have a hard time hearing out of my right ear\", or \"Do not touch me to wake me up as it startles  me\".  Outcome: Progressing  Goal: Absence of Hospital-Acquired Illness or Injury  Outcome: Progressing  Intervention: Identify and Manage Fall Risk  Recent Flowsheet Documentation  Taken 2/29/2024 0000 by Elsie Gutiérrez RN  Safety Promotion/Fall Prevention:   clutter free environment maintained   increased rounding and observation   lighting adjusted   " increase visualization of patient   nonskid shoes/slippers when out of bed   room near nurse's station   room organization consistent   safety round/check completed  Taken 2/28/2024 2000 by Elsie Gutiérrez RN  Safety Promotion/Fall Prevention:   nonskid shoes/slippers when out of bed   clutter free environment maintained   increased rounding and observation   increase visualization of patient   lighting adjusted   patient and family education   room organization consistent   safety round/check completed   room near nurse's station  Intervention: Prevent Skin Injury  Recent Flowsheet Documentation  Taken 2/29/2024 0000 by Elsie Gutiérrez RN  Body Position: position changed independently  Taken 2/28/2024 2000 by Elsie Gutiérrez RN  Body Position: position changed independently  Intervention: Prevent and Manage VTE (Venous Thromboembolism) Risk  Recent Flowsheet Documentation  Taken 2/28/2024 2000 by Elsie Gutiérrez RN  VTE Prevention/Management: SCDs (sequential compression devices) off  Intervention: Prevent Infection  Recent Flowsheet Documentation  Taken 2/29/2024 0000 by Elsie Gutiérrez RN  Infection Prevention:   environmental surveillance performed   hand hygiene promoted   equipment surfaces disinfected   visitors restricted/screened   single patient room provided   rest/sleep promoted   personal protective equipment utilized  Taken 2/28/2024 2000 by Elsie Gutiérrez RN  Infection Prevention:   environmental surveillance performed   hand hygiene promoted   equipment surfaces disinfected   single patient room provided   rest/sleep promoted   personal protective equipment utilized   visitors restricted/screened  Goal: Optimal Comfort and Wellbeing  Outcome: Progressing  Intervention: Provide Person-Centered Care  Recent Flowsheet Documentation  Taken 2/28/2024 2000 by Elsie Gutiérrez RN  Trust Relationship/Rapport:   care explained   questions answered   choices provided     Problem: Liver  Failure  Goal: Optimal Coping with Liver Failure  Outcome: Progressing  Intervention: Support Response to Liver Disease  Recent Flowsheet Documentation  Taken 2/28/2024 2000 by Elsie Gutiérrez RN  Supportive Measures: active listening utilized  Family/Support System Care:   support provided   involvement promoted  Goal: Fluid and Electrolyte Balance  Outcome: Progressing  Goal: Optimal Gastrointestinal Function  Outcome: Progressing  Intervention: Monitor and Support Gastrointestinal Function  Recent Flowsheet Documentation  Taken 2/29/2024 0000 by Elsie Gutiérrez RN  Body Position: position changed independently  Taken 2/28/2024 2000 by Elsie Gutiérrez RN  Body Position: position changed independently  Goal: Blood Glucose Level Within Target Range  Outcome: Progressing  Goal: Optimal Coagulation Function  Outcome: Progressing  Intervention: Monitor and Manage Coagulation  Recent Flowsheet Documentation  Taken 2/29/2024 0000 by Elsie Gutiérrez RN  Bleeding Precautions: blood pressure closely monitored  Taken 2/28/2024 2000 by Elsie Gutiérrez RN  Bleeding Precautions: blood pressure closely monitored  Goal: Absence of Infection Signs and Symptoms  Outcome: Progressing  Intervention: Prevent or Manage Infection  Recent Flowsheet Documentation  Taken 2/29/2024 0000 by Elsie Gutiérrez RN  Infection Management: aseptic technique maintained  Taken 2/28/2024 2000 by Elsie Gutiérrez RN  Infection Management: aseptic technique maintained  Goal: Optimal Neurologic Function  Outcome: Progressing  Intervention: Monitor and Optimize Neurologic Status  Recent Flowsheet Documentation  Taken 2/29/2024 0000 by Elsie Gutiérrez RN  Seizure Precautions: clutter-free environment maintained  Taken 2/28/2024 2000 by Elsie Gutiérrez RN  Sensory Stimulation Regulation:   auditory stimulation minimized   care clustered   quiet environment promoted   visual stimulation minimized  Seizure Precautions: clutter-free  environment maintained  Goal: Improved Oral Intake  Outcome: Progressing  Goal: Optimal Pain Control, Comfort and Function  Outcome: Progressing  Intervention: Prevent or Manage Pain  Recent Flowsheet Documentation  Taken 2/28/2024 2000 by Elsie Gutiérrez RN  Sleep/Rest Enhancement: awakenings minimized  Goal: Optimize Renal Function  Outcome: Progressing  Goal: Effective Oxygenation and Ventilation  Outcome: Progressing  Intervention: Promote Airway Secretion Clearance  Recent Flowsheet Documentation  Taken 2/29/2024 0000 by Elsie Gutiérrez RN  Activity Management:   activity adjusted per tolerance   up ad amy   ambulated to bathroom  Taken 2/28/2024 2000 by Elsie Gutiérrez RN  Cough And Deep Breathing: done independently per patient  Activity Management:   activity adjusted per tolerance   up ad amy   ambulated to bathroom  Intervention: Optimize Oxygenation and Ventilation  Recent Flowsheet Documentation  Taken 2/29/2024 0000 by Elsie Gutiérrez RN  Head of Bed (HOB) Positioning: HOB at 30-45 degrees  Taken 2/28/2024 2000 by Elsie Gutiérrez RN  Head of Bed (HOB) Positioning: HOB at 20-30 degrees

## 2024-02-29 NOTE — PROGRESS NOTES
[facility]  Transplant Infectious Disease Progress Note -- Sign Off     Patient:  Mary Lopez, Date of birth 1970, Medical record number [id number]  Date of Visit:  02/29/2024         Assessment and Recommendations:   Recommendations:  - Switch the IV ceftriaxone 2g daily to oral amoxiclllin 1 gram PO TID plus levofloxacin 500 mg levofloxacin daily through 3/8/24 to complete his fourteen day course (including the ceftriaxone, dating from 2/24/24) for the 2/23/24 Strep gallolyticus bacteremia of uncertain source (although most likely due to gut translocation).  - No other antimicrobial is presently needed.  - Check a repeat EKG for QTc once on the levofloxacin (to make certain it is not increasing).  - From an ID perspective, he could be listed now and proceed to liver transplant at any point -- i.e., can transplant before the antibiotic course is finished.  - A NAEL is not needed (given the bacteremia's rapid clearance, his rapid defervescence, and that there was only a single positive blood culture).  - Continue to simply monitor the WBC ~ weekly until the leukocytosis resolves.  - Give a second dose of a TwinRix (hepatitis A and B) vaccine in one month on about 3/27/24 if not yet transplanted by then.  - A need for post-discharge follow-up in Transplant ID Clinic is not anticipated.    The case was discussed with the [place] [id number] Medicine service today.  With the above Recommendations, Transplant ID will sign off now.  Thanks for allowing us to participate in the care of this gentleman.  Please page if additional ID questions or concerns arise.    Jimmy Conley MD  Pager [phone]    Assessment:  A 53 year old gentleman with a history of decompensated cirrhosis 2/2 EtOH c/b ascites, hepatic encephalopathy and grade I esophageal varices, T2DM, transplant candidate (currently inactive on transplant list) with recent hospitalization (1/2024) for encephalopathy and  suspected SBP (no safe window to complete para) presented to the ED on 2/23/24 febrile in septic shock and was found to have Streptococcus gallolyticus (bovis group) bacteremia which cleared quickly.  He is improving.    Infectious Disease issues:    Cleared Streptococcus bovis 2/23/24 bacteremia:  Only one of two sets of blood cultures drawn on 2/23/24 were positive for the pan-sensitive Strep gallolyticus (bovis group).  The source of the bacteremia is uncertain, but was probably transient gut translocation, since he had a quite normal (one small removed polyp) colonoscopy on 1/2/24, no recent GI bleeding, has no history of prosthetic joint replacements, no central lines, and an exam without concern for other non-GI sources or for seeding at this time.  There was little evidence for active SBP (although he had an episode of suspected SBP recently in 1/2024).  Paracentesis has not been able to be obtained in the past.  Nevertheless, the source of Strep bacteremia was likely GI and or SBP. He quickly defervesced with return of hemodynamic stability and clinically improved while on initial empiric cefepime and vancomycin, so his antibiotic therapy was de-escalated to ceftriaxone alone on 2/25/24 --  that will cover the Strep bovis and treat any SBP.  A transthoracic echo was negative for vegetations, no there is indication for a ANEL.  Three pairs of daily blood cultures from 2/24 - 26/24 are without growth to date, so no additional surveillance blood cultures are needed.  Given the uncertain (though likely GI) source of the bacteremia, a fourteen day course of antibiotic therapy (through 3/6/24 is indicated, but his transplant does not need to wait on that.  With the cleared bacteremia and at now four days of antibiotic therapy, he could proceed to transplant at any time from an ID standpoint.    2/23/24 leukocytosis, persisting:  His peripheral leukocytosis peaked at 42.6 post-admission on 2/23/24, then fell to 17.4  on 2/24/24 PM, but now is up a bit on 2/26/24 to 22.4.  Very likely, stress dose steroids initiated post-admission explain this.  A secondary site of Strep seeding is unlikely.    - QTc interval: 392 (2/15/2024)  - Bacterial prophylaxis: None   - Pneumocystis prophylaxis: None  - Serostatus & viral prophylaxis: CMV -, EBV +  - Fungal prophylaxis: None  - Immunization status: Will need repeat series of Hep B (non-reactive on 2/15/2024)  - Gamma globulin status:   - Isolation status: Routine.  - Code status: Full Code        Interval History:   Mr. Lopez remains afebrile (T max 98.9 degrees F) since 2/23/24 midday without recent chills or sweats.  His peripheral leukocytosis peaked at 42.6 post-admission on 2/23/24 is slowly resolving, down to 13.4 this morning.  An initial procalcitonin of 1.97 has not been repeated.  His creatinine has normalized to 1.16 today (versus 2.51 on admission).  He remains on ceftriaxone (since 2/25/24 PM, previously cefepime 2/23 - 25/24) for the 2/23/24 (one of two cultures, ceftriaxone PORSHA < 0.25, penicillin PORSHA < 0.06) Strep gallolyticus bacteremia.  H has intermittent confusion but is otherwise comfortable and lacks new EENT symptoms, cough, dyspnea on room air, chest pain, nausea, abdominal pain, rash, or headache.  Twice daily blood cultures from 2/24/24 to 2/26/24 are negative.  A 2/23/24 nares MRSA PCR screen was negative as were influenza / RSV / SARS CoV-2 PCRs.  A 2/27/24 RUQ vascular doppler showed cirrhosis and portal hypertension.  A 2/24/24 TTE showed no valvular abnormalities.  A 2/23/24 chest x-ray showed bilateral pulmonary edema.  A 2/23/24 abdominal CT showed an ongoing cirrhotic liver with portal hypertension stigmata including small volume ascites but no new pathology.  He would like to discharge to home soon.        History of Infectious Disease Illness (copied from the 2/25/24 Transplant ID Consult Note):   A 53 year old gentleman with a history of EtOH  cirrhosis (sober since 6/25/23) c/b ascites, hepatic encephalopathy and grade I esophageal varices, T2DM, transplant candidate (currently inactive on transplant list) with recent hospitalization (1/2024) for encephalopathy and suspected SBP (no safe window to complete para) presented to the ED on 2/23 febrile, septic shock who was found to have Streptococcus bovis bacteremia.  Patient was planned to be transplant on 2/15; however, the donor liver was not deemed satisfactory enough for transplantation. He also tested positive on that day with a cycle threshold of 43.6. He has not symptoms at this time for COVID or respiratory infections.  While at home, he progressively became more encephalopathic. He was noted to be fatigued and feverish by his wife. In the ED on presentation he was febrile and hypotensive. He was admitted to the hospital and sent to the ICU. Blood cultures grew out pan-susceptible Strep bovis. After blood cultures were obtained, broad spectrum antimicrobials were initiated with cefepime and vancomycin. Trans thoracic echocardiogram was negative for vegetations. CT of CAP demonstrated cirrhosis.   Outpatient has has been following with Dr. Jacobs as he was worked up for potential liver transplant. He has had 2 Quantiferon gold tests result as indeterminate likely due to immune dysfunction 2/2 liver failure.111Upon seeing him today, his wife was at his bedside. He is feeling well, improving since he was admitted. Some neck discomfort at site of central line placement. Breathing comfortably. Has some right shoulder discomfort that he attributes to his rotator cuff tear. He has not history of joint replacements or implanted material. They live in [place] with their cats, have one daughter who is currently deployed with the [employer].    Review of Systems:  As per Interval History -- full ROS is otherwise negative.    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)     Mild hyperlipidemia 12/07/2021    Persistent insomnia 07/13/2023    Portal hypertension (H) 07/13/2023    Scrotal abscess     Secondary esophageal 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 (H) 07/13/2023    Tobacco abuse disorder 03/11/2020    Type 2 diabetes mellitus with hyperglycemia (H) 12/22/2023     Past Surgical History:   Procedure Laterality Date    CHOLECYSTECTOMY      COLONOSCOPY N/A 1/2/2024    Procedure: COLONOSCOPY, WITH POLYPECTOMY;  Surgeon: Jak Urbina MD;  Location: [facility]    CV RIGHT HEART CATH MEASUREMENTS RECORDED N/A 1/30/2024    Procedure: Right Heart Catheterization;  Surgeon: Alfred Tafoya MD;  Location:  HEART CARDIAC CATH LAB    TONSILLECTOMY      VASECTOMY       Social History     Tobacco Use    Smoking status: Former     Types: Cigarettes     Passive exposure: Never    Smokeless tobacco: Current     Types: Chew     Last attempt to quit: 1/1/2004    Tobacco comments:     Chew daily 1/3 of a tin per day   Vaping Use    Vaping Use: Never used   Substance Use Topics    Alcohol use: Not Currently     Alcohol/week: 12.0 standard drinks of alcohol     Types: 12 Standard drinks or equivalent per week     Comment: Sober since 6/25/2023    Drug use: Not Currently          Current Medications & Allergies:      cefTRIAXone  2 g Intravenous Q24H    folic acid  1 mg Oral Daily    insulin aspart  8 Units Subcutaneous TID w/meals    insulin aspart  1-10 Units Subcutaneous TID AC    insulin aspart  1-7 Units Subcutaneous At Bedtime    insulin glargine  14  Units Subcutaneous BID    lactulose  20 g Oral TID    melatonin  10 mg Oral At Bedtime    metFORMIN  500 mg Oral Daily with supper    midodrine  10 mg Oral Q8H    pantoprazole  40 mg Oral BID AC    rifaximin  550 mg Oral BID    spironolactone  50 mg Oral Daily    thiamine  250 mg Intravenous Daily    Followed by    [START ON 3/2/2024] thiamine  100 mg Oral Daily     Infusions/Drips:     dextrose       Allergies   Allergen Reactions    Food Anaphylaxis     baked beans    Pineapple Itching          Physical Exam:   Patient Vitals for the past 24 hrs:   BP Temp Temp src Pulse Resp SpO2 Weight   02/29/24 0900 -- -- -- -- -- -- 110.3 kg (243 lb 2.7 oz)   02/29/24 0548 98/49 97.9  F (36.6  C) Oral 59 15 94 % --   02/28/24 2117 105/52 97.5  F (36.4  C) Oral 80 14 96 % --   02/28/24 1807 108/50 97.8  F (36.6  C) Oral 90 16 97 % --   02/28/24 1200 -- 97.1  F (36.2  C) Axillary 75 -- 100 % --     Ranges for vital signs over the past 24 hours:   Temp:  [97.1  F (36.2  C)-97.9  F (36.6  C)] 97.9  F (36.6  C)  Pulse:  [59-90] 59  Resp:  [14-16] 15  BP: ()/(49-52) 98/49  SpO2:  [94 %-100 %] 94 %  Vitals:    02/26/24 0000 02/27/24 0500 02/29/24 0900   Weight: 109.8 kg (242 lb 1 oz) 110.4 kg (243 lb 6.2 oz) 110.3 kg (243 lb 2.7 oz)     Intake/Output Summary (Last 24 hours) at 2/29/2024 1114  Last data filed at 2/29/2024 0400  Gross per 24 hour   Intake 705 ml   Output 575 ml   Net 130 ml     Physical Examination:  GENERAL:  Awake, responding, WDWN 52 yo man in NAD.  HEAD:  NCAT.  EYES:  EOMI, icteric sclerae  ENT:  Oropharynx is moist without exudates or ulcers. Tongue is midline  NECK:  Supple. No cervical lymphadenopathy. Right internal jugular CVC line site lacks inflammation.  LUNGS:  Clear to auscultation bilateral.   CARDIOVASCULAR:  Regular rate and rhythm with no murmur  ABDOMEN:  Normal bowel sounds, soft, nontender.   SKIN:  No acute rashes. Line in place without any surrounding erythema or exudate.  EXTREM:   "Distally warm, 2+ bipedal edema.  NEUROLOGIC:  Grossly nonfocal. Moves extremities x 4.         Laboratory Data:     No results found for: \"ACD4\", \"HIVPCR\"    Inflammatory Markers    Recent Labs   Lab Test 12/19/23  0758   PSA 0.07     Immune Globulin Studies     Recent Labs   Lab Test 11/28/23  1013 09/13/23  1028   IGG 2,499*  2,499* 2,966*   IGA  --  861*   IGG1 1,523*  --    IGG2 734*  --    IGG3 199*  --    IGG4 131*  --      Metabolic Studies       Recent Labs   Lab Test 02/29/24  0959 02/29/24  0807 02/29/24  0554 02/28/24  0739 02/28/24  0541 02/25/24  0441 02/25/24  0440 02/25/24  0126 02/25/24  0122 02/24/24  0551 02/24/24  0400 02/23/24  2008 02/23/24  1902 02/23/24  0931 02/23/24  0928   NA  --   --  132*  --  130*   < > 127*  --   --    < > 124*  --   --   --  123*   POTASSIUM  --   --  3.6  --  3.5   < > 4.5  --   --    < > 5.7*  --   --   --  5.5*   CHLORIDE  --   --  102  --  101   < > 97*  --   --    < > 94*  --   --   --  91*   CO2  --   --  23  --  22   < > 21*  --   --    < > 21*  --   --   --  21*   ANIONGAP  --   --  7  --  7   < > 9  --   --    < > 9  --   --   --  11   BUN  --   --  29.8*  --  36.3*   < > 43.2*  --   --    < > 42.5*  --   --   --  35.5*   CR  --   --  1.16  --  1.24*   < > 1.64*  --   --    < > 2.16*  --   --   --  2.51*   GFRESTIMATED  --   --  76  --  70   < > 50*  --   --    < > 36*  --   --   --  30*   *   < > 122*   < > 178*   < > 334*   < >  --    < > 355*   < >  --    < > 141*   A1C  --   --   --   --   --   --   --   --  4.8  --   --   --   --   --   --    MEG  --   --  9.0  --  9.1   < > 8.2*  --   --    < > 7.8*  --   --   --  8.2*   PHOS  --   --   --   --   --   --  4.4  --   --    < > 4.7*  --   --   --   --    MAG  --   --   --   --   --   --  1.9  --   --    < > 1.5*  --   --   --   --    LACT  --   --   --   --   --   --   --   --   --   --  2.6*  --  3.1*   < > 4.5*   PCAL  --   --   --   --   --   --   --   --   --   --   --   --   --   --  1.97*    " < > = values in this interval not displayed.     Hepatic Studies    Recent Labs   Lab Test 02/29/24  0554 02/28/24  0541 02/27/24  0558 02/26/24  0945 02/24/24  0400 02/23/24  0928 09/05/23  1242 08/17/23  1112   BILITOTAL 8.2* 8.6*   < >  --    < > 11.1*   < > 6.3*   DBIL  --   --   --   --   --  6.66*   < > 3.52*   ALKPHOS 194* 191*   < >  --    < > 191*   < > 270*   PROTTOTAL 5.6* 5.8*   < >  --    < > 6.3*   < > 7.3   ALBUMIN 2.3* 2.4*   < >  --    < > 2.4*   < > 2.1*   * 92*   < >  --    < > 56*   < > 90*   ALT 51 46   < >  --    < > 25   < > 42   LDH  --   --   --   --   --   --   --  277*   JAQUELINE  --   --   --  46  --  64*   < >  --     < > = values in this interval not displayed.     Pancreatitis testing    Recent Labs   Lab Test 02/15/24  2049 03/12/22  1006   AMYLASE 28  --    TRIG  --  151*     Hematology Studies   Recent Labs   Lab Test 02/29/24  0554 02/28/24  0541 02/27/24  0558 02/26/24  0343 02/23/24  2013 02/23/24  0928 02/18/24  1202 09/19/23  1653 09/13/23  1028 08/29/23  1018 08/23/23  1018   WBC 13.4* 15.7* 17.1* 22.4*   < > 28.6* 16.4*   < > 14.7*   < > 16.5*   ANEU  --   --   --   --   --   --   --   --  9.7*  --  13.5*   ANEUTAUTO  --   --   --   --   --  24.8* 13.1*   < >  --   --   --    ALYM  --   --   --   --   --   --   --   --  2.5  --  1.3   ALYMPAUTO  --   --   --   --   --  0.8 1.0   < >  --   --   --    BENOIT  --   --   --   --   --   --   --   --  1.5*  --  0.2   AMONOAUTO  --   --   --   --   --  1.5* 1.5*   < >  --   --   --    AEOS  --   --   --   --   --   --   --   --  0.4  --  0.3   AEOSAUTO  --   --   --   --   --  0.2 0.4   < >  --   --   --    ABSBASO  --   --   --   --   --  0.1 0.1   < >  --   --   --    HGB 7.7* 7.8* 8.5* 7.6*   < > 6.6* 7.2*   < > 9.2*   < > 8.0*   HCT 22.4* 22.8* 25.1* 22.2*   < > 20.4* 22.2*   < > 27.1*   < > 23.4*   PLT 59* 59* 58* 58*   < > 95* 85*   < > 80*   < > 111*    < > = values in this interval not displayed.     Clotting Studies   "  Recent Labs   Lab Test 02/29/24  0554 02/28/24  0541 02/27/24  0558 02/26/24  0343 02/25/24  0852 02/24/24  0831   INR 3.30* 3.36* 3.28* 3.04*   < > 4.83*   PTT  --   --   --   --   --  67*    < > = values in this interval not displayed.     Iron Testing    Recent Labs   Lab Test 02/29/24  0554 02/15/24  2034 02/13/24  1615 12/26/23  0736 12/19/23  0758 08/29/23  1018 08/23/23  1018 08/17/23  1112 08/10/23  1107 08/09/23  1122 07/25/23  1702 07/15/23  1048   IRON  --   --   --   --  135  --  120  --   --  118  --  92   KE  --   --   --   --  2,948*  --  4,261*  --   --  4,611*   < >  --    MCV 99   < > 104*   < > 98   < > 98 99   < > 98   < > 111*   FOLIC  --   --   --   --   --   --  13.4  --   --   --   --   --    B12  --   --   --   --   --   --   --   --   --   --   --  2,662*   HAPT  --   --   --   --   --   --   --  <3*  --   --   --   --    RETP  --   --  6.8*  --   --   --  5.7* 5.5*   < >  --   --   --    RETICABSCT  --   --  0.157*  --   --   --  0.137* 0.120*   < >  --   --   --     < > = values in this interval not displayed.     Arterial Blood Gas Testing    Recent Labs   Lab Test 02/23/24  0928   O2PER 23     Thyroid Studies     Recent Labs   Lab Test 03/12/22  1006   TSH 2.18     Urine Studies     Recent Labs   Lab Test 02/23/24  1558 02/15/24  2015 11/09/23  1147 09/05/23  1242   URINEPH 5.0 5.0 6.0 5.5   NITRITE Negative Negative Negative Negative   LEUKEST Negative Negative Negative Trace*   WBCU <1 <1 0-5 0-5     Medication levels    Recent Labs   Lab Test 02/24/24  1013   VANCOMYCIN 9.7     Microbiology:    Fungal testing  No lab results found.    Invalid input(s): \"HIFUN\", \"FUNGL\"    Last Culture results   Culture   Date Value Ref Range Status   02/26/2024 No growth after 3 days  Preliminary   02/26/2024 No growth after 3 days  Preliminary   02/25/2024 No growth after 4 days  Preliminary   02/25/2024 No growth after 4 days  Preliminary   02/24/2024 No growth after 4 days  Preliminary "   02/24/2024 No growth after 4 days  Preliminary   02/23/2024 No Growth  Final   02/23/2024 Positive on the 1st day of incubation (A)  Final   02/23/2024 (AA)  Final    Streptococcus gallolyticus (Streptococcus bovis group)     Comment:     2 of 2 bottles   02/15/2024   Final    No Vancomycin Resistant Enterococcus species isolated   02/15/2024 <10,000 CFU/mL Mixture of Urogenital Rachel  Final         Last checks of Clostridioides difficile testing  No lab results found.    Syphilis Testing    Treponema Antibody Total   Date Value Ref Range Status   12/19/2023 Nonreactive Nonreactive Final     Quantiferon testing   Recent Labs   Lab Test 02/23/24  0928 02/18/24  1202 01/18/24  0807 01/09/24  1622 12/19/23  0758 12/19/23  0755   TBRES  --   --   --  Indeterminate*  --  Indeterminate*   LYMPH 3 6   < > 15   < >  --     < > = values in this interval not displayed.     Virology:    Coronavirus-19 testing    Recent Labs   Lab Test 02/23/24  0928 02/15/24  2017   [id number] Negative Positive*   CYCLETHRES  --  43.6     Respiratory virus (non-coronavirus-19) testing    Recent Labs   Lab Test 02/23/24 0928   INFZA Negative   INFZB Negative   IRSV Negative     CMV viral loads  No lab results found.    Hepatitis B Testing     Recent Labs   Lab Test 02/15/24  2034 08/17/23  1112   AUSAB <3.50 2.19   HBCAB Nonreactive  --    HEPBANG Nonreactive  --      Hepatitis C Antibody   Date Value Ref Range Status   02/15/2024 Nonreactive Nonreactive Final     Comment:     A nonreactive screening test result does not exclude the possibility of exposure to or infection with HCV. Nonreactive screening test results in individuals with prior exposure to HCV may be due to antibody levels below the limit of detection of this assay or lack of reactivity to the HCV antigens used in this assay. Patients with recent HCV infections (<3 months from time of exposure) may have false-negative HCV antibody results due to the time needed for  seroconversion (average of 8 to 9 weeks).     CMV Antibody IgG   Date Value Ref Range Status   02/15/2024 No detectable antibody. No detectable antibody.  Final   12/19/2023 No detectable antibody. No detectable antibody.  Final     CMV Antibody IgM   Date Value Ref Range Status   02/15/2024 Negative Negative Final     EBV Capsid Antibody IgG   Date Value Ref Range Status   02/15/2024 Positive (A) No detectable antibody. Final     Comment:     Suggests recent or past exposure.   12/19/2023 Positive (A) No detectable antibody. Final     Comment:     Suggests recent or past exposure.     EBV Capsid Antibody IgM   Date Value Ref Range Status   02/15/2024 No detectable antibody. No detectable antibody. Final     Imaging:  Recent Results (from the past 48 hour(s))   US Abd Hepatic & Portal Vasculature Cmpl    Narrative    EXAMINATION: US ABD HEPATIC & PORTAL VASCULATURE CMPL 2/27/2024  12:39 PM     COMPARISON: CT abdomen and pelvis without contrast 2/23/2024.  Ultrasound abdomen with Doppler 12/14/2022.    HISTORY: Pretransplant evaluation.    TECHNIQUE: The abdomen was scanned in standard fashion with  specialized ultrasound transducer(s) using both gray-scale, color  Doppler, and spectral flow techniques. Doppler study only requested.    Findings:  Liver: Survey images show morphologic changes of cirrhosis, not fully  evaluated as a Doppler study only was ordered.    Doppler:   Extrahepatic portal vein flow is retrograde at 14 cm/s.  Right portal vein flow is retrograde, measuring 12 cm/s.  Left portal vein flow is antegrade, measuring 19 cm/s. Left portal  vein flows into a large recanalized para umbilical vein with velocity  of 34 cm/s.    Flow in the hepatic artery is towards the liver and:  125 cm/s peak systolic  0.67 resistive index.     The splenic vein is obscured, not visualized.  The left, middle, and  right hepatic veins are patent with flow towards the IVC. The IVC is  patent with flow towards the heart.  IVC is distended at 3.2 cm.   The  visualized aorta is not dilated measuring 2.6 cm.    Fluid: Small volume ascites as seen on CT      Impression    Impression:   1.  Reversal of flow in the right and main portal vein consistent with  portal venous hypertension. No thrombus identified. Left portal vein  is antegrade presumably flowing into the large recanalized para  umbilical vein also consistent portal venous hypertension.  2.  Cirrhosis, not fully evaluated as a Doppler study only was  performed.  3.  Ascites.    ALFRED WARE MD         SYSTEM ID:  [id number]      DISPLAY PLAN FREE TEXT

## 2024-02-29 NOTE — PHARMACY-ADMISSION MEDICATION HISTORY
Pharmacy Intern Admission Medication History    Admission medication history is complete. The information provided in this note is only as accurate as the sources available at the time of the update.    Information Source(s): Caregiver via in-person    Pertinent Information:     Reviewed ClearScript Dispense Report for relevant information.     Changes made to PTA medication list:  Added: None  Deleted:   Zinc oxide 20% external ointment (patient has not used in over a year - requested this medication be removed).   Changed:   Tresiba dose from 40-50 units daily to 36 units daily.   Lactulose frequency from twice daily to 3 times daily.   Torsemide dose explaining the patient takes 2 tablets in the morning and 1 tablet in the afternoon for a total daily dose of 3 tablets.     Allergies reviewed with patient and updates made in EHR: yes    Medication History Completed By: Kendell Morton 2/29/2024 10:43 AM    PTA Med List   Medication Sig Note Last Dose    blood glucose (NO BRAND SPECIFIED) test strip Use to test blood sugar two times daily or as directed. To accompany: Blood Glucose Monitor Brands: per insurance.  2/23/2024 at 0800    blood glucose monitoring (NO BRAND SPECIFIED) meter device kit Use to test blood sugar twice times daily or as directed. Preferred blood glucose meter OR supplies to accompany: Blood Glucose Monitor Brands: per insurance.  2/23/2024 at 0800    Continuous Blood Gluc Sensor (DEXCOM G7 SENSOR) MISC Change every 10 days.  2/23/2024 at 0800    cyclobenzaprine (FLEXERIL) 10 MG tablet Take 1 tablet (10 mg) by mouth 3 times daily as needed for muscle spasms  Past Week    doxepin (SINEQUAN) 10 MG capsule TAKE 1 CAPSULE(10 MG) BY MOUTH AT BEDTIME  Past Week at 2030    folic acid (FOLVITE) 1 MG tablet Take 1 tablet (1 mg) by mouth daily  Past Week at 0800    HYDROcodone-acetaminophen (NORCO) 5-325 MG tablet Take 1 tablet by mouth every 12 hours as needed for severe pain 2/29/2024: For rotator  cuff tear.  Past Week at 2030    insulin degludec (TRESIBA FLEXTOUCH) 100 UNIT/ML pen Inject 40-50 Units Subcutaneous daily Inject 40 units daily.  Increase by 1 unit daily until fasting BG most often <150 as long as this does not lead to overnight or early morning low BG <70. (Patient taking differently: Inject 36 Units Subcutaneous daily Inject 36 units daily.  Increase by 1 unit daily until fasting BG most often <150 as long as this does not lead to overnight or early morning low BG <70.)  Past Week at 0800    Insulin Lispro (HUMALOG KWIKPEN) 200 UNIT/ML soln Inject 10-18 Units Subcutaneous 4 times daily (with meals and nightly) Give 10 units plus sliding scale to correct any premeal blood sugars >175.  Past Week at 1930    insulin pen needle (BD PEN NEEDLE SHERRIE 2ND GEN) 32G X 4 MM miscellaneous USES 5 PER DAY  Past Week    lactulose (CHRONULAC) 10 GM/15ML solution TAKE 30 MLS BY MOUTH 2 TIMES DAILY (Patient taking differently: TAKE 30 MLS BY MOUTH 3 TIMES DAILY)  Past Week at 2030    melatonin 10 MG TABS tablet Take 1 tablet (10 mg) by mouth nightly as needed for sleep  Past Week at 2030    metFORMIN (GLUCOPHAGE XR) 500 MG 24 hr tablet TAKE 1 TABLET(500 MG) BY MOUTH DAILY WITH DINNER  Past Week at 0800    multivitamin w/minerals (THERA-VIT-M) tablet TAKE 1 TABLET BY MOUTH DAILY  Past Week at 0800    omeprazole (PRILOSEC) 20 MG DR capsule Take 1 capsule (20 mg) by mouth 2 times daily  Past Week at 2030    potassium chloride ER (KLOR-CON M) 10 MEQ CR tablet Take 2 tablets (20 mEq) by mouth 2 times daily  Past Week at 1700    rifaximin (XIFAXAN) 550 MG TABS tablet Take 1 tablet (550 mg) by mouth 2 times daily for 180 days  Past Week at 1700    spironolactone (ALDACTONE) 25 MG tablet Take 1 tablet (25 mg) by mouth daily  Past Week at 0800    thin (NO BRAND SPECIFIED) lancets Use with lanceting device. To accompany: Blood Glucose Monitor Brands: per insurance.  2/22/2024    torsemide (DEMADEX) 10 MG tablet Take 3  tablets (30 mg) by mouth daily (Patient taking differently: Take 30 mg by mouth daily Patient takes 2 tablets in the morning and 1 tablet in the afternoon.)  Past Week at 2030      Kendell Georges D4 student

## 2024-02-29 NOTE — PROGRESS NOTES
"  Inpatient Diabetes Management Service : New Consult Note     Patient: Mary Lopez   YOB: 1970    MRN: [id number]     Date of Consult : 02/29/2024   Date of Admission: 2/23/2024     Reason for Consult: \"T2DM on insulin, requiring improved blood sugar control \"  Consult Requestor: Margoth Nolen MD           History of Present Illness:   Mary Lopez is a 53 year old male admitted on 2/23/2024 with a PMH of alcoholic cirrhosis (sober since 6/25/2023) c/b ascites, hepatic encephalopathy, grade 1 esophageal varices, transplant candidate (although inactive on list due to acute illness), and T2DM who was admitted to the ICU after presenting to the ED febrile, hypotensive, and encephalopathic. Found to have strep bovis bacteremia likely 2/2 to SBP/GI source and septic shock requiring minimal pressors, now off pressors with stable blood pressures.    Additional Historian:  Wife, Lisy provided all of history - patient sleeping.     Patient is not known to the Inpatient Diabetes Service from past admission(s).     Planned Procedures/Surgeries: none     Current inpatient diabetes regimen: Lantus 20 units BID, Novolog 14 units TID AC, Novolog correction 1:25 >140 TID AC; 1:25 >200 HS    Relevant Labs on Admission:          Inpatient Glucose Trends:           Diabetes History:   Diabetes Type and Duration:   T2DM diagnosed 10/31/2023 with A1c 8.1%.  GAD65 negative 11/3/2023, IA-2 negative 11/3/2023, C -peptide normal (3.7) 10/31/2023.    Complications:  no peripheral neuropathy, unknown retinopathy (last eye exam: none since DM diagnosis), no nephropathy, no gastroparesis, no macrovascular disease    Last A1c: 4.8% (2/25/24) - in setting of anemia   RBC transfusion in past 3 months: yes, 2/24 and 2/25 during this admission.   History of DKA: no    Safety Kit:   - Glucagon: none, would like.     Outpatient Diabetes Provider:  [facility], Suzanne Todd (ALLY). Last " visit 11/28/2023  Formal Diabetes Education/Educator: Liana Garcia. Last visit 2/23/2024.     Diet/Lifestyle: inconsistent meals. 2 meals a day plus snacks (often snacks on fruit). Very limited exercise.   Ability to [place] Prescribed Regimen:  Wife manages all BG checks and insulin administration.            PTA Regimen:   Diabetes Medications:      1) Tresiba 36 units once daily at 7AM (previously taking 42 units, reduced 1 week prior to admission)   2) Humalog 15 units with breakfast and lunch, 13 units with dinner plus correction scale 1 unit per 35 >175 mg/dL and 1 unit per 35 >210 at bedtime.   Historical Diabetes Medications: none, reports outpatient diabetes educator was looking into an insulin pump.     BG monitoring device & frequency:  Dexcom G7, also uses glucometer, sometimes has issues with android and Dexcom G7.    BG trends at home: Usually 200-300s.   Hypoglycemia PTA: yes, once a week. Always overnight. Ususally 50-70s. Less lows after reducing Tresiba 1 week prior to admission. Had low the night he was admitted because wife gave mealtime dose before bed when patient wasn't eating because she was confused.             Medications Impacting Glycemia:    Steroids: completed 2/27   D5W containing solutions/medications: none  Other medications impacting glucose:  none         Diet/Nutrition:    Orders Placed This Encounter      Combination Diet Moderate Consistent Carb (60 g CHO per Meal) Diet; 2 gm NA Diet  Supplements:  Glucerna with meals.   TF: none  TPN: none          Review of Systems:    Feeling fatigued, low appetite without nausea or emesis. Denies open sores/wound on feet.          Past Medical History:       Past Medical History:   Diagnosis Date    ANGEL (acute kidney injury) (H24)     Alcoholic cirrhosis of liver without ascites (H) 07/13/2023    Alcoholic hepatitis with ascites (H28) 10/03/2023    Alcoholic hepatitis without ascites (H28) 07/13/2023    Closed fracture of one rib of left  side 09/14/2023    Concussion without loss of consciousness 03/11/2020    Decompensated hepatic cirrhosis (H) 09/15/2023    Diabetes mellitus, type 2 (H) 11/22/2023    Essential hypertension 03/11/2020    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)     Mild hyperlipidemia 12/07/2021    Persistent insomnia 07/13/2023    Portal hypertension (H) 07/13/2023    Scrotal abscess     Secondary esophageal 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 (H) 07/13/2023    Tobacco abuse disorder 03/11/2020    Type 2 diabetes mellitus with hyperglycemia (H) 12/22/2023             Past Surgical History:      Past Surgical History:   Procedure Laterality Date    CHOLECYSTECTOMY      COLONOSCOPY N/A 1/2/2024    Procedure: COLONOSCOPY, WITH POLYPECTOMY;  Surgeon: Jak Urbina MD;  Location: [facility]    CV RIGHT HEART CATH MEASUREMENTS RECORDED N/A 1/30/2024    Procedure: Right Heart Catheterization;  Surgeon: Alfred Tafoya MD;  Location:  HEART CARDIAC CATH LAB    TONSILLECTOMY      VASECTOMY               Social History:      Social History     Tobacco Use    Smoking status: Former     Types: Cigarettes     Passive exposure: Never    Smokeless tobacco: Current     Types: Chew     Last attempt to quit: 1/1/2004    Tobacco comments:     Chew daily 1/3 of a tin per day   Substance Use Topics    Alcohol use: Not Currently     Alcohol/week: 12.0 standard drinks of alcohol     Types: 12 Standard drinks or equivalent per week     Comment: Sober since 6/25/2023        History   Sexual Activity    Sexual activity: Yes    Partners: Female    Birth control/ protection: Male Surgical      Tobacco: chewing tobacco    Etoh:  quit 6/2023.   Other Substance: none    Marital Status:     Lives with wife.          Family History:    Family History of Diabetes: father and brother with T2DM     Family History   Problem Relation Age of Onset    Anxiety Disorder Mother     Depression Mother     Bipolar Disorder Mother     Chronic Obstructive  "Pulmonary Disease Mother     Lung Cancer Mother 81    Morbid Obesity Father     Diabetes Father     Diabetes Type 2  Brother     Substance Abuse Maternal Grandfather     Substance Abuse Paternal Grandfather     Colon Cancer No family hx of     Liver Disease No family hx of              Physical Exam:    BP 98/49 (BP Location: Right arm)   Pulse 59   Temp 97.9  F (36.6  C) (Oral)   Resp 15   Ht 1.727 m (5' 8\")   Wt 110.3 kg (243 lb 2.7 oz)   SpO2 94%   BMI 36.97 kg/m     General Appearance:  Jaundiced, NAD, resting in bed   Lungs: Breathing comfortably on room air. No cough, shortness of breath, wheezing.   Abdomen: Round, nondistended. Soft, nontender.   Extremities: + Bilateral lower extremity edema. Feet: no open sores/wounds  Skin:  Warm and dry. Jaundice.  Psych: Calm, appropriate mood and affect.           Laboratory Data:      Lab Results   Component Value Date    A1C 4.8 02/25/2024    A1C 7.7 12/19/2023    A1C 8.1 10/31/2023    A1C 5.1 08/23/2023    A1C 5.7 03/12/2022     Recent Labs   Lab Test 02/29/24  0554   WBC 13.4*   RBC 2.26*   HGB 7.7*   HCT 22.4*   MCV 99   MCH 34.1*   MCHC 34.4   RDW 17.3*   PLT 59*     Recent Labs   Lab Test 02/29/24  0959 02/29/24  0807 02/29/24  0554 02/28/24  0739 02/28/24  0541   NA  --   --  132*  --  130*   POTASSIUM  --   --  3.6  --  3.5   CHLORIDE  --   --  102  --  101   CO2  --   --  23  --  22   ANIONGAP  --   --  7  --  7   * 114* 122*   < > 178*   BUN  --   --  29.8*  --  36.3*   CR  --   --  1.16  --  1.24*   MEG  --   --  9.0  --  9.1    < > = values in this interval not displayed.     Recent Labs   Lab Test 02/29/24  0554   PROTTOTAL 5.6*   ALBUMIN 2.3*   BILITOTAL 8.2*   ALKPHOS 194*   *   ALT 51            Assessment and Recommendations:       Assessment:   Type 2 Diabetes Mellitus without known diabetes complication, sub-optimal control based on reported home glucoses 200-300s. Last A1c 4.8% (2/25/2024)  - A1c inaccurte in setting of anemia " and RBC transfusions the day prior.       Plan/Recommendations:    - Start Lantus 28 q 24 hrs at 1600 --> no Lantus given this morning, will give in 1 dose at 1600 to help transition back to Tresiba once daily outpatient.  - Start Novolog Meal Coverage: 1 units per 8 g CHO, TID AC and PRN with snacks/supplements   - Continue Novolog Correction Scale: medium resistance; 1:50 >140 TID AC; 1:50 >200 HS  - BG monitoring: TID AC, HS, 0200  - Hypoglycemia protocol    - Carb counting protocol     Discussion: Hypoglycemia last night, primary team reduced glargine for today (AM dose still not given at 11:30 am - will push back to 1600 and give in 1 dose). Low appetite per patient's wife albeit he ate a few grapes, sips of juice, and full glucerna this morning without carb coverage. Suspect higher BG reading at noon today. Will start Novolog       Discharge Planning: (tentative)     BG monitoring: TID AC, HS. Has Dexcom G7 and glucometer.  Medications: Tresiba once daily, Novolog set meal dose plus correction 1:35 >170 TID AC; 1:35 >210 HS (home correction scale)   Test Claims: requested for glucagon 2/29.      Education: follows closely with outpatient diabetes educator, will likely be ok not seeing inpatient diabetes educator.   Outpatient Follow-up: Scheduled 3/12/2024 with [facility] endocrinology - Chloé CHANCE). Patient's wife will also request follow up with Liana (diabetes educator). Educator looking into insulin pump options per patient's wife.     Thank you for this consult. IDS will continue to follow.      Please notify Inpatient Diabetes Service if changes are planned to steroids, nutrition, TPN/TF and anticipated procedures requiring prolonged NPO status.     Luba Dennis PA-C  Inpatient Diabetes Service  Pager: [phone]  Available on Sc[employer]blog      To contact Inpatient Diabetes Service:     7 AM - 5 PM: Page the IDS [facility] following the patient that day (see filed or incomplete progress notes/consult notes under  Endocrinology)    OR if uncertain of provider assignment: page job code [phone]    5 PM - 7 AM: First call after hours is to primary service.    For urgent after-hours questions, page job code for on call fellow: [phone]     I spent a total of 80 minutes on the date of the encounter doing prep/post-work, chart review, history and exam, documentation and further activities per the note including lab review, multidisciplinary communication, counseling the patient and/or coordinating care regarding acute hyper/hypoglycemic management, as well as discharge management and planning/communication.  See note for details.

## 2024-02-29 NOTE — PROGRESS NOTES
Transfer to  at 1300 from MICU. Denies pain, nausea, shortness of breath. Steady on feet. Voiding and stooling adequately. A&Ox4, mental status at baseline per wife. Up walking in halls. Tolerating regular diet and has good appetite. BG WNL. Bed alarm is on.    Belongings he comes with: smartphone, , clothes.

## 2024-02-29 NOTE — PROGRESS NOTES
Care Management Follow Up    Length of Stay (days): 6    Expected Discharge Date: 03/03/2024     Concerns to be Addressed: discharge planning     Patient plan of care discussed at interdisciplinary rounds: Yes    Anticipated Discharge Disposition: Home     Anticipated Discharge Services: Home Care PT  Anticipated Discharge DME: None    Patient/family educated on Medicare website which has current facility and service quality ratings: no  Education Provided on the Discharge Plan: Yes  Patient/Family in Agreement with the Plan: yes    Referrals Placed by CM/SW:  Home Care  Private pay costs discussed: Not applicable    Additional Information:  Per team, pt is likely medically ready for discharge 3/1. PT recommendations are for home with assist from family or home with Home Care PT. Writer met with pt and wife and they reported they would appreciate Home Care PT if it can be secured. Writer placed referral and home care PT order. At time of this note, home care agency has not been found. Wife works from home and can assist with pt and provide transportation to outpt PT if home care cannot be found.     Per providers, ID has confirmed that pt will discharge on oral antibiotics.     Lily Vidal RNCC  Covering for 6 Med/Surg  Phone [phone]  Pager [phone]      RN Care Coordinator     Social Work and Care Management Department      SEARCHABLE in [facility] - search CARE COORDINATOR       [place] [employer] [place] [phone]) Saturday & Sunday; [phone]) [facility] Recognized Holidays     Units: 5A Onc Vocera & 5C Vocera Pager: [phone]    Units: 6B Vocera & 6C Vocera  Pager: [phone]    Units: 7A SOT RNCC Vocera, 7B Med Surg Vocera, & 7C Med Surg Vocera  Pager: [phone]    Units: 6A Vocera & 4A CVICU Vocera, 4C MICU Vocera, and 4E SICU Vocera   Pager: [phone]    Units: 5 Ortho Vocera & 5 Med Surg Vocera  Pager: [phone]    Units: 6 Med Surg Vocera & 8 Med Surg Vocera  Pager  [phone]

## 2024-03-01 ENCOUNTER — APPOINTMENT (OUTPATIENT)
Dept: PHYSICAL THERAPY | Facility: CLINIC | Age: 54
DRG: 871 | End: 2024-03-01
Payer: COMMERCIAL

## 2024-03-01 ENCOUNTER — TELEPHONE (OUTPATIENT)
Dept: FAMILY MEDICINE | Facility: CLINIC | Age: 54
End: 2024-03-01
Payer: COMMERCIAL

## 2024-03-01 LAB
ALBUMIN SERPL BCG-MCNC: 2.2 G/DL (ref 3.5–5.2)
ALP SERPL-CCNC: 166 U/L (ref 40–150)
ALT SERPL W P-5'-P-CCNC: 52 U/L (ref 0–70)
ANION GAP SERPL CALCULATED.3IONS-SCNC: 7 MMOL/L (ref 7–15)
AST SERPL W P-5'-P-CCNC: 100 U/L (ref 0–45)
BACTERIA BLD CULT: NO GROWTH
BACTERIA BLD CULT: NO GROWTH
BILIRUB SERPL-MCNC: 9.4 MG/DL
BUN SERPL-MCNC: 22.7 MG/DL (ref 6–20)
CALCIUM SERPL-MCNC: 8.9 MG/DL (ref 8.6–10)
CHLORIDE SERPL-SCNC: 104 MMOL/L (ref 98–107)
CREAT SERPL-MCNC: 0.99 MG/DL (ref 0.67–1.17)
DEPRECATED HCO3 PLAS-SCNC: 21 MMOL/L (ref 22–29)
EGFRCR SERPLBLD CKD-EPI 2021: >90 ML/MIN/1.73M2
ERYTHROCYTE [DISTWIDTH] IN BLOOD BY AUTOMATED COUNT: 18 % (ref 10–15)
GLUCOSE BLDC GLUCOMTR-MCNC: 199 MG/DL (ref 70–99)
GLUCOSE BLDC GLUCOMTR-MCNC: 231 MG/DL (ref 70–99)
GLUCOSE BLDC GLUCOMTR-MCNC: 287 MG/DL (ref 70–99)
GLUCOSE BLDC GLUCOMTR-MCNC: 298 MG/DL (ref 70–99)
GLUCOSE SERPL-MCNC: 181 MG/DL (ref 70–99)
HCT VFR BLD AUTO: 21.4 % (ref 40–53)
HGB BLD-MCNC: 7.1 G/DL (ref 13.3–17.7)
INR PPP: 3.13 (ref 0.85–1.15)
MCH RBC QN AUTO: 32.9 PG (ref 26.5–33)
MCHC RBC AUTO-ENTMCNC: 33.2 G/DL (ref 31.5–36.5)
MCV RBC AUTO: 99 FL (ref 78–100)
PLATELET # BLD AUTO: 53 10E3/UL (ref 150–450)
POTASSIUM SERPL-SCNC: 3.9 MMOL/L (ref 3.4–5.3)
PROT SERPL-MCNC: 5.4 G/DL (ref 6.4–8.3)
RBC # BLD AUTO: 2.16 10E6/UL (ref 4.4–5.9)
SODIUM SERPL-SCNC: 132 MMOL/L (ref 135–145)
WBC # BLD AUTO: 11 10E3/UL (ref 4–11)

## 2024-03-01 PROCEDURE — 250N000013 HC RX MED GY IP 250 OP 250 PS 637

## 2024-03-01 PROCEDURE — 97116 GAIT TRAINING THERAPY: CPT | Mod: GP

## 2024-03-01 PROCEDURE — 99232 SBSQ HOSP IP/OBS MODERATE 35: CPT | Mod: GC | Performed by: INTERNAL MEDICINE

## 2024-03-01 PROCEDURE — 250N000013 HC RX MED GY IP 250 OP 250 PS 637: Performed by: STUDENT IN AN ORGANIZED HEALTH CARE EDUCATION/TRAINING PROGRAM

## 2024-03-01 PROCEDURE — 99233 SBSQ HOSP IP/OBS HIGH 50: CPT | Performed by: NURSE PRACTITIONER

## 2024-03-01 PROCEDURE — 36415 COLL VENOUS BLD VENIPUNCTURE: CPT

## 2024-03-01 PROCEDURE — 250N000011 HC RX IP 250 OP 636

## 2024-03-01 PROCEDURE — 85027 COMPLETE CBC AUTOMATED: CPT

## 2024-03-01 PROCEDURE — 120N000002 HC R&B MED SURG/OB UMMC

## 2024-03-01 PROCEDURE — 80053 COMPREHEN METABOLIC PANEL: CPT

## 2024-03-01 PROCEDURE — 85610 PROTHROMBIN TIME: CPT

## 2024-03-01 RX ORDER — SPIRONOLACTONE 50 MG/1
50 TABLET, FILM COATED ORAL DAILY
Qty: 30 TABLET | Refills: 0 | Status: CANCELLED | OUTPATIENT
Start: 2024-03-02 | End: 2024-04-01

## 2024-03-01 RX ORDER — LEVOFLOXACIN 500 MG/1
500 TABLET, FILM COATED ORAL DAILY
Status: DISCONTINUED | OUTPATIENT
Start: 2024-03-01 | End: 2024-03-02 | Stop reason: HOSPADM

## 2024-03-01 RX ORDER — AMOXICILLIN 500 MG/1
1000 CAPSULE ORAL EVERY 8 HOURS SCHEDULED
Status: DISCONTINUED | OUTPATIENT
Start: 2024-03-01 | End: 2024-03-02 | Stop reason: HOSPADM

## 2024-03-01 RX ADMIN — LACTULOSE 20 G: 20 SOLUTION ORAL at 13:37

## 2024-03-01 RX ADMIN — MIDODRINE HYDROCHLORIDE 10 MG: 5 TABLET ORAL at 03:53

## 2024-03-01 RX ADMIN — RIFAXIMIN 550 MG: 550 TABLET ORAL at 20:44

## 2024-03-01 RX ADMIN — LEVOFLOXACIN 500 MG: 500 TABLET, FILM COATED ORAL at 10:05

## 2024-03-01 RX ADMIN — LACTULOSE 20 G: 20 SOLUTION ORAL at 20:44

## 2024-03-01 RX ADMIN — FOLIC ACID 1 MG: 1 TABLET ORAL at 09:59

## 2024-03-01 RX ADMIN — PANTOPRAZOLE SODIUM 40 MG: 40 TABLET, DELAYED RELEASE ORAL at 15:59

## 2024-03-01 RX ADMIN — RIFAXIMIN 550 MG: 550 TABLET ORAL at 09:59

## 2024-03-01 RX ADMIN — AMOXICILLIN 1000 MG: 500 CAPSULE ORAL at 21:59

## 2024-03-01 RX ADMIN — SPIRONOLACTONE 50 MG: 50 TABLET ORAL at 10:00

## 2024-03-01 RX ADMIN — PANTOPRAZOLE SODIUM 40 MG: 40 TABLET, DELAYED RELEASE ORAL at 09:59

## 2024-03-01 RX ADMIN — THIAMINE HYDROCHLORIDE 250 MG: 100 INJECTION, SOLUTION INTRAMUSCULAR; INTRAVENOUS at 14:05

## 2024-03-01 RX ADMIN — AMOXICILLIN 1000 MG: 500 CAPSULE ORAL at 13:33

## 2024-03-01 RX ADMIN — LACTULOSE 20 G: 20 SOLUTION ORAL at 10:06

## 2024-03-01 RX ADMIN — MIDODRINE HYDROCHLORIDE 10 MG: 5 TABLET ORAL at 13:33

## 2024-03-01 RX ADMIN — MIDODRINE HYDROCHLORIDE 10 MG: 5 TABLET ORAL at 20:44

## 2024-03-01 NOTE — PROGRESS NOTES
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 Initial blood cultures with strep bovis, follow up negative.       RECOMMENDATIONS:    Updates for 03/01/2024 :  - continue abx for total of 14 days, ID transitioned to po   - active for liver transplant  - restart torsemide 20 mg daily on 3/2  - Daily MELD labs- CMP, INR, CBC      #Strep bovis bacteremia  - Blood cultures with strep bovis 2/2 bottles on 2/24. Follow up cultures negative. Started on abx 2/23 with cefepime and then ceftriaxone. Now transitioned to amoxicillin and levofloxacin.    - TTE 2/23 does not have evidence of vegetation. No need for ANEL per ID.   - Colonoscopy 1/2/24 without evidence of malignancy    # ANGEL- resolved  - improving creatinine to 0.99  - has worsening anasarca, continue spironolactone 50 mg daily. May restart torsemide at 20 mg daily. 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 Active for transplant with listed MELD 34. Did have recent COVID + (2/15), on admit was negative.   - US abd 12/14 without HCC    # Hepatic encephalopathy  - mentation stable Continue lactulose and rifaximin, titrate for ~3 BM's per day.     # Ascites  - small ascites. No safe pocket for para.   - remains on diuretics.     # EV  - EGD with gr I EV and GOV2 on 7/26/23. No current sign of GIB.    # Debility  - continue PT/OT for strengthening.     Patient was discussed with attending  "physician, Dr. Muniz.  The above note reflects our joint plan.     Next follow up appointment: Dr. Muniz 4/16/24    Thank you for the opportunity to be involved with  [facility]. Please call with any questions or concerns.    Marclela Betancourt APRN, CNP  Inpatient Hepatology [facility]      Subjective    Denies fevers, nausea or vomiting. Has been up walking with PT/OT.         Objective    VS: BP (!) 95/38 (BP Location: Left arm)   Pulse 80   Temp 97.8  F (36.6  C) (Oral)   Resp 18   Ht 1.727 m (5' 8\")   Wt 110.3 kg (243 lb 2.7 oz)   SpO2 93%   BMI 36.97 kg/m       Gross per 24 hour   Intake 1100 ml   Output 1125 ml   Net -25 ml      General: In no acute distress, mild facial muscle wasting  Neuro: AOx3, No asterixis  HEENT:  Noscleral icterus, Nooral lesions  CV: . Skin warm and dry  Lungs:  Respirations even and nonlabored on room air  Abd:  Nontender, nondistended   Extrem:  +1-2 lower  peripheral edema  Skin:  mild jaundice  Psych: pleasant    MEDICATIONS:  Current Facility-Administered Medications   Medication    acetaminophen (TYLENOL) tablet 325 mg    amoxicillin (AMOXIL) capsule 1,000 mg    dextrose 10% infusion    glucose gel 15-30 g    Or    dextrose 50 % injection 25-50 mL    Or    glucagon injection 1 mg    glucose gel 15-30 g    Or    dextrose 50 % injection 25-50 mL    Or    glucagon injection 1 mg    diclofenac (VOLTAREN) 1 % topical gel 4 g    folic acid (FOLVITE) tablet 1 mg    insulin aspart (NovoLOG) injection (RAPID ACTING)    insulin aspart (NovoLOG) injection (RAPID ACTING)    insulin aspart (NovoLOG) injection (RAPID ACTING)    insulin aspart (NovoLOG) injection (RAPID ACTING)    insulin glargine (LANTUS PEN) injection 32 Units    lactulose (CHRONULAC) solution 20 g    levofloxacin (LEVAQUIN) tablet 500 mg    melatonin tablet 10 mg    midodrine (PROAMATINE) tablet 10 mg    ondansetron (ZOFRAN ODT) ODT tab 4 mg    Or    ondansetron (ZOFRAN) injection 4 mg    pantoprazole (PROTONIX) " EC tablet 40 mg    rifaximin (XIFAXAN) tablet 550 mg    sodium chloride 0.9% BOLUS 1-250 mL    spironolactone (ALDACTONE) tablet 50 mg    thiamine (B-1) injection 250 mg    Followed by    [START ON 3/2/2024] thiamine (B-1) tablet 100 mg       REVIEW OF LABORATORY, PATHOLOGY AND IMAGING RESULTS:  BMP  Recent Labs   Lab 03/01/24  1030 03/01/24 0434 02/29/24  2211 02/29/24 1918 02/29/24  0807 02/29/24  0554 02/28/24  0739 02/28/24  0541 02/27/24  0809 02/27/24  0558   NA  --  132*  --   --   --  132*  --  130*  --  129*   POTASSIUM  --  3.9  --   --   --  3.6  --  3.5  --  3.8   CHLORIDE  --  104  --   --   --  102  --  101  --  99   MEG  --  8.9  --   --   --  9.0  --  9.1  --  8.9   CO2  --  21*  --   --   --  23  --  22  --  22   BUN  --  22.7*  --   --   --  29.8*  --  36.3*  --  41.1*   CR  --  0.99  --   --   --  1.16  --  1.24*  --  1.37*   * 181* 213* 182*   < > 122*   < > 178*   < > 289*    < > = values in this interval not displayed.     CBC  Recent Labs   Lab 03/01/24 0434 02/29/24  0554 02/28/24  0541 02/27/24  0558   WBC 11.0 13.4* 15.7* 17.1*   RBC 2.16* 2.26* 2.38* 2.60*   HGB 7.1* 7.7* 7.8* 8.5*   HCT 21.4* 22.4* 22.8* 25.1*   MCV 99 99 96 97   MCH 32.9 34.1* 32.8 32.7   MCHC 33.2 34.4 34.2 33.9   RDW 18.0* 17.3* 17.0* 16.9*   PLT 53* 59* 59* 58*     INR  Recent Labs   Lab 03/01/24  0434 02/29/24  0554 02/28/24  0541 02/27/24  0558   INR 3.13* 3.30* 3.36* 3.28*     LFTs  Recent Labs   Lab 03/01/24  0434 02/29/24  0554 02/28/24  0541 02/27/24  0558   ALKPHOS 166* 194* 191* 188*   * 104* 92* 94*   ALT 52 51 46 43   BILITOTAL 9.4* 8.2* 8.6* 8.8*   PROTTOTAL 5.4* 5.6* 5.8* 6.2*   ALBUMIN 2.2* 2.3* 2.4* 2.4*        MELD 3.0: 30 at 3/1/2024  4:34 AM  MELD-Na: 30 at 3/1/2024  4:34 AM  Calculated from:  Serum Creatinine: 0.99 mg/dL (Using min of 1 mg/dL) at 3/1/2024  4:34 AM  Serum Sodium: 132 mmol/L at 3/1/2024  4:34 AM  Total Bilirubin: 9.4 mg/dL at 3/1/2024  4:34 AM  Serum Albumin: 2.2 g/dL  at 3/1/2024  4:34 AM  INR(ratio): 3.13 at 3/1/2024  4:34 AM  Age at listin years  Sex: Male at 3/1/2024  4:34 AM    Previous work-up:   Lab Results   Component Value Date    HEPBANG Nonreactive 02/15/2024    HBCAB Nonreactive 02/15/2024    AUSAB <3.50 02/15/2024    HCVAB Nonreactive 02/15/2024    KE 2,948 (H) 2023    IRONSAT  2023      Comment:      Unable to calculate:  UIBC or Iron value is outside detectable level.    TTG 2.2 2023    TTGG 2.0 2023     (H) 2023    GEORGE Negative 2023    SMOOTHMUS 70 (H) 2023    Z0NHKPS Whole Blood 2023    A1A 97 2023    U0HCYVJ Negative 2023    Q8ZCZFX Heterozygous (A) 2023    L8KLSITX Not Applicable 2023    E0THZPWOD See Note 2023    MITM2 1.2 2023    TSH 2.18 2022    CHOL 203 (H) 2022    HDL 71 2022     (H) 2022    TRIG 151 (H) 2022    A1C 4.8 2024    POPETH <10 2024    PLPETH <10 2024      Lab Results   Component Value Date    SPECDES  2023     Blood: ACD      LDRESULTS  2023     HEMOCHROMATOSIS RESULTS    HFE Gene C282Y (G845A) RESULTS:    C282Y Mutation Interpretation: HETEROZYGOTE    HFE Gene H63D (C187G) RESULTS:    H63D Mutation Interpretation: NORMAL    HFE Gene S65C (A193T) RESULTS:    S65C Mutation Interpretation: NORMAL           Imaging Results:  None current

## 2024-03-01 NOTE — PROGRESS NOTES
Care Management Follow Up    Length of Stay (days): 7    Expected Discharge Date: 03/03/2024     Concerns to be Addressed: discharge planning     Patient plan of care discussed at interdisciplinary rounds: Yes    Anticipated Discharge Disposition: Home     Anticipated Discharge Services: None  Anticipated Discharge DME: None    Patient/family educated on Medicare website which has current facility and service quality ratings: no  Education Provided on the Discharge Plan: Yes  Patient/Family in Agreement with the Plan: yes    Referrals Placed by CM/SW:  home PT  Private pay costs discussed: Not applicable    Additional Information:    Patient's status reviewed by chart. Discharge pending BS in controlled and ID finalize home AB. Continue to monitor.    Discharge resource:    [facility]   Accepted on 02/29/2024 for PT  P: [phone]  F: [phone]    Brittney Hill RN  7C RN Coordinator  Phone: [phone]  Pager: [phone]

## 2024-03-01 NOTE — PLAN OF CARE
Goal Outcome Evaluation:      Plan of Care Reviewed With: patient    Overall Patient Progress: no changeOverall Patient Progress: no change    Outcome Evaluation: D/c pending endo and ID finalize the plans.    Brittney Hill RN  7C RN Coordinator  Phone: [phone]  Pager: [phone]

## 2024-03-01 NOTE — CONSULTS
Diabetes Educator consult received for Mary Lopez, 53 year old male, admitted on 2/23/24 with strep bovis bacteremia likely 2/2 SBP/GI source and septic shock.     History of T2DM without known diabetes complications. PTA was on Tresiba daily and Novolog set meal dose plus correction. Monitoring Dexcom G7 and glucometer. Per Inpatient Diabetes Service, patient follows up closely with outpatient diabetes educator. Next Endocrinology appointment scheduled for 3/12. Discussed--okay to follow up with outpatient educator.     Nursing should review final insulin plan recommendations and hypoglycemic recognition and treatment with Mary and his wife.     Andrea Griffiths DNP, CNS  Diabetes Educator  Pager: [phone]  Office: [phone]  Securely message with Hilda

## 2024-03-01 NOTE — DISCHARGE SUMMARY
"[facility]  Discharge Summary - Medicine & Pediatrics       Date of Admission:  2/23/2024  Date of Discharge:  3/2/2024  Discharging Provider: Dr. Pearl Silva  Discharge Service: Medicine Service, LEANNE TEAM 1    Discharge Diagnoses   #Streptococcus galloylyticus (bovis) bacteremia likely 2/2 GI/SBP source (gut translocation)  #Leukocytosis, improving  #Septic Shock, resolved  #Decompensated cirrhosis 2/2 Alcohol Cirrhosis MELD 3.0-34   #Alpha-1 antitrypsin MZ heterozygote, C282Y heterozygote  #Hepatic Encephalopathy  #Grade I Esophageal Varices, no Hx of variceal bleed  #Acute on Chronic Metabolic Encephalopathy (HE vs Sepsis), improving  #c/f delirium  #Acute Kidney Injury, likely shock/prerenal, resolved  #hyponatremia, stable  #Type 2 DM  #Concerning for hypoglycemic episode  #Steroid induced hyperglycemia  #Acute on chronic macrocytic anemia w/RIJ hematoma, improving  #Hypofibrinogenemia   #Thrombocytopenia vs uremic platelet dysfunction, stable  #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    Weight as of this encounter: 113.5 kg (250 lb 3.6 oz).    # Moderate Malnutrition: based on nutrition assessment      Follow-ups Needed After Discharge   Follow-up Appointments     Adult [facility] Follow-up and recommended labs and tests      You should follow up with your primary care provider in 5-7 days for   follow up after hospitalization and for blood pressure recheck and   medication check. Follow up as scheduled with Endocrinology, Diabetes   Education, and Hepatology.     Appointments on [place] and/or [facility] (with [facility] or [facility]   provider or " service). Call [phone] if you haven't heard regarding   these appointments within 7 days of discharge.        PCP will need to follow up with blood pressure checks, midodrine and torsemide dosing, and insulin adjustments as needed between checks with endocrinology and diabetes education. Reassess to ensure no ongoing signs or symptoms of infection after completion of antibiotic course on 3/8.    Unresulted Labs Ordered in the Past 30 Days of this Admission       No orders found from 1/24/2024 to 2/24/2024.        These results will be followed up by PCP    Discharge Disposition   Discharged to home  Condition at discharge: Good    Hospital Course   Mary Lopez is a 53 year old male admitted on 2/23/2024 with a PMH of alcoholic cirrhosis (sober since 6/25/2023) c/b ascites, hepatic encephalopathy, grade 1 esophageal varices, transplant candidate (although inactive on list due to acute illness), and T2DM who was admitted to the ICU after presenting to the ED febrile, hypotensive, and encephalopathic. Found to have strep bovis bacteremia likely 2/2 to SBP/GI source and septic shock requiring minimal pressors, transferred to medicine floor once off of pressors and on midodrine. Endocrine and Infectious Disease consulted to assist with antibiotic course and insulin planning, Hepatology followed throughout admission. Patient steadily improved and was medically ready to discharge by 3/2/2024 once oral antibiotic and insulin plan was finalized. He will need to follow up as scheduled with Endocrinology, Diabetes Education, Hepatology, and Primary Care. The following problems were addressed during his hospitalization:      #Streptococcus galloylyticus (bovis) bacteremia likely 2/2 GI/SBP source (gut translocation)  #Leukocytosis, resolved  #Septic Shock, resolved  Patient presented to the ED on 2/23/2024 with fevers, tachycardia, hypotension, leukocytosis, and encephalopathy. Patient given fluids, but  eventually started on levophed for concerns of septic shock. Blood culture grew streptococcus galloylyticus (bovis). MRSA swab and UA negative. Source suspected to be 2/2 SBP or GI source with translocation. Patient with history of ascites and suspected SBP with last hospitalization. Unable to complete paracentesis on this admission due to ascites pocket being too small to complete procedure. Echo from 2/24/24 showed no concern for vegetation. Colonoscopy from 1/2/24 did not show any signs of malignancy. Patient weaned off of levophed in the ED, started on midodrine and stress dose steroids. Was able to wean off of steroids, remains on scheduled midodrine. Blood pressure has remained stable. Leukocytosis continues to improve off of steroids and blood cultures have remained clear. Transplant ID recommended transitioning to PO antibiotics with levofloxacin and amoxicillin to complete course of antibiotics through 3/8/2024.  - Continue amoxicillin 1 g TID and levofloxacin 500 mg daily to continue through 3/8/2024  - Follow up with PCP for 2nd dose of [employer] vaccine for HepA and HepB immunization  - continue midodrine 10 mg Q8H  - monitor BP at home, stop/hold midodrine if BP >130/90, and resume if SBP<90  - resumed spironolactone safely on 3/1, will dose torsemide on a prn basis given ongoing softer pressures while on midodrine   - patient and his wife instructed to check BP 1-2 times daily, and to only give torsemide if SBP >100  - follow up with PCP in 5-7 days for recheck and repeat CMP to monitor kidney function     Antimicrobials:  -Cefepime (02/23-02/25)  -Vancomycin (02/23-02/25)  -Ceftriaxone (02/25-02/29)  -Amoxicillin (03/01-**planned end date 3/8)  -Levofloxacin (03/1-**planned end date 3/8)     Cultures:  - 2/26 Bcx: pending   - 2/25 Bcx: NGTD  - 2/24 Bcx: NGTD  - 2/23 Bcx 2/2 bottles: Streptococcus gallolyticus (Streptococcus bovis group)  - 2/23 verigene: no organism detected      #Decompensated  cirrhosis 2/2 Alcohol Cirrhosis MELD 3.0-34   #Alpha-1 antitrypsin MZ heterozygote, C282Y heterozygote  #Hepatic Encephalopathy  #Grade I Esophageal Varices, no Hx of variceal bleed  Patient initially presented on 2/15 for liver transplant, but found to be COVID+ and donor liver transplant was ineligible. Most recent EGD in 2023 found to have G1 EV/GOV Type1 with no hx of bleeding. Patient with history of suspected SBP in last admission (2024), but did not have pocket to complete paracentesis. Patient currently on PTA lactulose and rifaximin for hepatic encephalopathy. Patient with noted history of hepatic encephalopathy, but patient's mental status seems to be at baseline s/p antibiotics for his infection. Patient temporarily inactivated from transplant list due to bacteremia, and was re-listed for transplant candidacy per Hepatology on 2024.   - follow up with Hepatology as scheduled  - continue omeprazole 20 mg BID  - continue PTA Lactulose 20 g TID (goal of 3-4 BM per day)  - continue rifaximin 550mg BID  - continue PTA folate and high dose thiamine  - continue PTA spironolactone 25 mg daily to help with edema  - will resume PTA torsemide as a daily prn only if SBP >100 as discussed above     MELD 3.0: 31 at 3/2/2024  4:35 AM  MELD-Na: 32 at 3/2/2024  4:35 AM  Calculated from:  Serum Creatinine: 0.89 mg/dL (Using min of 1 mg/dL) at 3/2/2024  4:35 AM  Serum Sodium: 128 mmol/L at 3/2/2024  4:35 AM  Total Bilirubin: 9.8 mg/dL at 3/2/2024  4:35 AM  Serum Albumin: 2.5 g/dL at 3/2/2024  4:35 AM  INR(ratio): 3.18 at 3/2/2024  4:35 AM  Age at listin years  Sex: Male at 3/2/2024  4:35 AM       #Acute on Chronic Metabolic Encephalopathy (HE vs Sepsis), improving  #c/f delirium, improving  Patient with decompensated cirrhosis complicated by history of hepatic encephalopathy, who presented with altered mental status. Likely compounded by sepsis and elevated ammonium levels. Ammonia levels decreased to 46 on  2/26 from 64 on 2/23. Today, patient's mentation seems to be at baseline per wife since 2/24. Additional concerns for delirium, as it appears that patient's confusion is worse at night. Overall improved by time of discharge, encourage keeping regular sleep/wake cycle at home.   - delirium precautions  - continue PTA lactulose and rifaximin  - can continue melatonin 10 mg at bedtime if helpful  - stop PTA doxepin with risk of hypotension  - okay to restart low dose trazodone, provided short term prescription to use as needed if SBP>100     #Acute Kidney Injury, likely shock/prerenal, resolved  #hyponatremia, stable  Creatinine elevated to 2.5 upon admission (baseline 0.6-1.2). Upon transfer to medicine floor, improved to 1.48. Etiology thought likely due to pre-renal cause with hypotension/hypovolemia with septic shock. Received 2L NS in ED and albumin in ICU. Additionally, sodium low at 130, but has consistently been low throughout admission and improved from 123 upon admission. Creatinine improved back to normal today.  - restarted PTA spironolactone 25 mg daily  - torsemide as above  - BP monitoring as above and follow up with PCP     #Type 2 DM  #Concerning for hypoglycemic episode  #Steroid induced hyperglycemia  Prior to admission, patient was taking 36 units Tresiba and 15 units Humalog w/breakfast and lunch. 13 units of dinner. Placed on insulin drip while in ICU due to acute illness and elevated blood sugars. Transitioned off insulin drip to glargine and aspart for meal time insulin. Per ICU team, concerns that patient was being given excess insulin at home with hypoglycemic episode, diabetes education consulted while patient here. Blood sugars improved off of stress dose steroids. Endocrinology assisted with diabetes plan, will discharge on 36 units of Tresiba daily, with 8 units of Humalog prior to meals and a 1:35 correction scale. Diabetes education consulted, will defer to outpatient management given  frequent outpatient contact and patient is well known by team.   - Endocrine consulted, appreciate recommendations   - Tresiba 36 units daily   - Humalog 8 units QAC   - Humalog 1:35 unit correction scale QAC and HS  - Endocrinology follow up scheduled for 3/12  - Close follow up to continue with diabetes education  - Continue to monitor BG QAC and HS - if persistently above 200 over the next couple of days, patient is to call endocrine clinic for additional management with blood sugars  - Follow up with PCP in 5-7 days to review all medications and to also double check blood sugars     #Acute on chronic macrocytic anemia w/RIJ hematoma, improving  #Hypofibrinogenemia   #Thrombocytopenia vs uremic platelet dysfunction, stable  #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 7.6 upon transfer to floor team. Notes of hematoma noted at RIJ site on 2/24. Hematoma mildly tender to touch, but hemoglobin has remained stable and patient otherwise hemodynamically stable. INR elevated to 4.8, fibrogen <60. Most likely in setting of end stage liver disease. Important to r/o DIC so factor 8 assay is obtained, did not indicate DIC.Teg results consistent hypofibrinogenemia and thrombocytopenia vs platelet dysfunction. Has received various blood products to maintain lab values (Vitamin K x2, 3 units pRBC, 2 units cryo, 1 unit platelets) while in ICU. No further need of additional products on medicine floor, and discharging in stable condition.   - PCP to follow up with repeat CBC in 5-7 days     #Alcohol Use Dependence, in remission  Sober since 06/25/2023.      #Hyperkalemia, resolved  K+ elevated to 5.7 (02/24) improved to 3.8 (02/26) s/p lokelma x1.  -Trend BMP daily      #R shoulder tear-chronic  Pt had a fall in October and now has pain in shoulder. Was told not a candidate for surgery, recently started hydrocodone on 2/19.  - hold PTA hydrocodone with soft pressures   - discussed use  of Voltaren gel outpatient as needed     #Moderate malnutrition in the context of chronic illness/disease, ruled out severe malnutrition   Given concerns for severe malnutrition, Nutrition consulted while inpatient. Does not have severe malnutrition but instead has moderate malnutrition.      Consultations This Hospital Stay   PHARMACY TO DOSE VANCO  GI HEPATOLOGY ADULT IP CONSULT  PHARMACY TO DOSE VANCO  INFECTIOUS DISEASE GENERAL ADULT IP CONSULT  CARE MANAGEMENT / SOCIAL WORK IP CONSULT  PHYSICAL THERAPY ADULT IP CONSULT  OCCUPATIONAL THERAPY ADULT IP CONSULT  PHARMACY IP CONSULT  DIABETES EDUCATION IP CONSULT  LYMPHEDEMA THERAPY IP CONSULT  DIABETES EDUCATION IP CONSULT  NUTRITION SERVICES ADULT IP CONSULT  DIABETES EDUCATION IP CONSULT  ENDOCRINE DIABETES ADULT IP CONSULT  PHARMACY LIAISON FOR MEDICATION COVERAGE CONSULT    Code Status   Full Code       The patient was discussed with Dr. Silva.    Betsy Vazquez MD  [facility] 7C MED SURG  [address] [place] [address]  Phone: [phone]        Physician Attestation   I saw this patient with the resident and agree with the resident/fellow's findings and plan of care as documented in the note.        Pearl Silva MD  Date of Service (when I saw the patient): 3/2/24    ______________________________________________________________________    Physical Exam   Vital Signs: Temp: 98.2  F (36.8  C) Temp src: Oral BP: 95/40 Pulse: 80   Resp: 18 SpO2: 95 % O2 Device: None (Room air)    Weight: 250 lbs 3.55 oz  General: NAD, laying in bed, appears comfortable  HEENT: EOMI, PERRLA, atraumatic, scleral icterus noted, hematoma noted on RIJ site, slightly tender to touch, improved   CV: RRR, no murmur noted  Lungs: clear to auscultation bilaterally, no wheezes or crackles noted, breathing non-labored on room air  Abdomen: soft, non-tender to palpation, non-distended, bowel sounds active  Neuro: alert and oriented to person, time.  At baseline according to wife. Conversationally tangential, but he notes that he overall feels well. No asterixis or clonus.  No focal deficits noted. No asterixis.   Skin: overall appears jaundiced  Extremity: 2+ pitting edema in bilateral lower extremities        Primary Care Physician   Jimmie Hoyt    Discharge Orders      Home Care Referral      Home Care Referral      Primary Care - Care Coordination Referral      Reason for your hospital stay    You were hospitalized for septic shock, or low blood pressure due to a severe infection. You were treated with medications to help support your blood pressure and with antibiotics to treat the infection. You were seen by Infectious Disease, Hepatology, and Endocrinology and Diabetes Education to help coordinate your treatment during your hospital stay. To continue treating your infection, you should take amoxicillin and levofloxacin (antibiotics) through 3/8/2024 to complete your antibiotic course. It is important that you complete your antibiotics even if you are feeling better to ensure your have completely cleared your infection.     As your blood pressure is still recovering, you should continue to monitor your blood pressure at home and check at least 2 times daily. If you feel dizzy, weak, or lightheaded, you should check your blood pressure, and if less than 90, you should call your clinic. If your systolic blood pressure (top number on the machine) is 100 or greater, you can take a dose of your torsemide once daily as needed for fluid. Do not take your torsemide if your blood pressure is under 100. You should continue to take midodrine 10mg every 8 hours (three times daily) unless your blood pressure is higher than 130/90, then you should stop your next dose.    Do not take doxepin for sleep as this medication can also lower your blood pressure. You can try taking trazodone instead, and a short prescription was sent to your pharmacy for this. You should  still make sure that blood pressure is 100 or greater before taking trazodone.     Continue to monitor blood sugars before meals and before bedtime each day and use the correction scale as described (1 unit of fast acting insulin for every 35 that glucose is above 140). Give 36 units of Tresiba (long acting insulin) daily. If blood sugars are persistently above 200 you should call the endocrine clinic and they will help adjust your insulin doses as needed. You will need to follow up with the endocrine clinic on 3/12. Follow up with your diabetes educator as usual.     You will need to follow up with primary care clinic in the next 5-7 days for a recheck and to re-evaluate your blood pressure and other medications. Follow up with your hepatology/liver clinic as scheduled (next appointment is 4/16/24).     Activity    Your activity upon discharge: activity as tolerated     Adult [facility] Follow-up and recommended labs and tests    You should follow up with your primary care provider in 5-7 days for follow up after hospitalization and for blood pressure recheck and medication check. Follow up as scheduled with Endocrinology, Diabetes Education, and Hepatology.     Appointments on [place] and/or [facility] (with [facility] or [facility] provider or service). Call [phone] if you haven't heard regarding these appointments within 7 days of discharge.     Diet    Follow this diet upon discharge: Orders Placed This Encounter      Snacks/Supplements Adult: Glucerna; With Meals      Combination Diet Moderate Consistent Carb (60 g CHO per Meal) Diet; 2 gm NA Diet       Significant Results and Procedures   Most Recent 3 CBC's:  Recent Labs   Lab Test 03/02/24  0435 03/01/24  0434 02/29/24  0554   WBC 10.2 11.0 13.4*   HGB 7.2* 7.1* 7.7*   * 99 99   PLT 54* 53* 59*     Most Recent 3 BMP's:  Recent Labs   Lab Test 03/02/24  0751 03/02/24  0435 03/01/24  2149 03/01/24  1030 03/01/24  0434 02/29/24  0807 02/29/24  0554   NA   --  128*  --   --  132*  --  132*   POTASSIUM  --  4.4  --   --  3.9  --  3.6   CHLORIDE  --  100  --   --  104  --  102   CO2  --  22  --   --  21*  --  23   BUN  --  19.3  --   --  22.7*  --  29.8*   CR  --  0.89  --   --  0.99  --  1.16   ANIONGAP  --  6*  --   --  7  --  7   MEG  --  8.7  --   --  8.9  --  9.0   * 331* 298*   < > 181*   < > 122*    < > = values in this interval not displayed.     Most Recent 2 LFT's:  Recent Labs   Lab Test 03/02/24 0435 03/01/24 0434   AST 89* 100*   ALT 50 52   ALKPHOS 191* 166*   BILITOTAL 9.8* 9.4*     Most Recent 3 INR's:  Recent Labs   Lab Test 03/02/24 0435 03/01/24 0434 02/29/24  0554   INR 3.18* 3.13* 3.30*   ,   Results for orders placed or performed during the hospital encounter of 02/23/24   XR Chest Port 1 View    Narrative    Exam: XR CHEST PORT 1 VIEW, 2/23/2024 9:41 AM    Comparison: 2/15/2024    History: hypoxia, fever, cough    Findings:  Single AP portable semiupright view of the chest.    Trachea is midline. Stable cardiomediastinal silhouette. Unchanged  elevation of the right hemidiaphragm. Low lung volumes. Mild diffuse  interstitial opacities. Left basilar opacity at the costophrenic  angle. There is no pneumothorax or pleural effusion. Right upper  quadrant surgical clips.      Impression    Impression:   1. Retrocardiac opacities could represent infection/atelectasis.  2. Low lung volumes and findings of pulmonary edema.    I have personally reviewed the examination and initial interpretation  and I agree with the findings.    SHIVANI JUSTIN MD         SYSTEM ID:  [id number]   CT Abdomen Pelvis w/o Contrast    Narrative    EXAMINATION: CT ABDOMEN PELVIS W/O CONTRAST, 2/23/2024 11:20 AM    INDICATION: abd distention and pain, liver cirrhosis, fever,    COMPARISON STUDY: CT 2/15/2024, 2/6/2024    TECHNIQUE: CT scan of the abdomen and pelvis was performed without  contrast on multidetector CT scanner using volumetric acquisition  technique and  images were reconstructed in multiple planes with  variable thickness and reviewed on dedicated workstations.     CT scan radiation dose is optimized to minimum requisite dose using  automated dose modulation techniques.    FINDINGS:    Lower thorax: Trace left pleural effusion with overlying consolidative  opacity containing air bronchograms. Paraesophageal varices.    Abdomen/pelvis:  Liver: Cirrhotic morphology of the liver. No intrahepatic biliary  ductal dilation.    Biliary System: The gallbladder is surgically absent. No extrahepatic  biliary ductal dilation.    Pancreas: No mass or pancreatic ductal dilation.    Adrenal glands: The right adrenal gland is normal. The left adrenal  gland is difficult to characterize given adjacent splenic varices.    Spleen: Enlarged measuring approximately 18 cm in greatest axial  dimension    Kidneys: No obstructing calculus or hydronephrosis.    Gastrointestinal tract : Normal caliber small bowel.    Mesentery/peritoneum/retroperitoneum: No mass. No free air.  Small to  moderate volume ascites. Fat and fluid containing right inguinal  hernia.    Lymph nodes: No significant lymphadenopathy.    Vasculature: Patency of the major intra-abdominal vasculature cannot  be assessed on this noncontrast exam. Normal caliber aorta.  Paraesophageal and splenic varices as noted above.    Pelvis: Urinary bladder is normal.  The prostate is normal in size.    Osseous structures: No aggressive or acute osseous lesion.  Mild  degenerative changes of the spine most significant at L5-S1.      Soft tissues: Bilateral gynecomastia.  Diffuse subcutaneous anasarca.      Impression    IMPRESSION:   1. Cirrhotic morphology of the liver with sequela of portal  hypertension including splenomegaly and perisplenic/esophageal  varices. Small volume ascites.    2. No definite acute pathology is appreciated in the abdomen on this  noncontrast exam.    3. Trace left pleural effusion and overlying  consolidative opacity  favored to represent atelectasis.    I have personally reviewed the examination and initial interpretation  and I agree with the findings.    JULIO JENKINS MD         SYSTEM ID:  [id number]   XR Chest Port 1 View    Narrative    Exam: XR CHEST PORT 1 VIEW, 2/23/2024 12:14 PM    Comparison: 2/23/2024 at 0934 hours    History: line placement    Findings:  Single AP portable semiupright view of the chest.    Trachea is midline. Right IJ central venous catheter with tip  projecting over the right atrium. Stable cardiomediastinal silhouette.  Unchanged elevation of the right hemidiaphragm. Low lung volumes. Mild  diffuse interstitial opacities. Left basilar opacity at the  costophrenic angle. There is no pneumothorax or pleural effusion.  Right upper quadrant surgical clips.      Impression    Impression:   1. Interval placement of right IJ central venous catheter with tip  projecting over the right atrium.  2. Stable retrocardiac opacities which could represent  infection/atelectasis.  3. Similar low lung volumes and findings of pulmonary edema.    I have personally reviewed the examination and initial interpretation  and I agree with the findings.    SHIVNAI JUSTIN MD         SYSTEM ID:  [id number]   US Abd Hepatic & Portal Vasculature Cmpl    Narrative    EXAMINATION: US ABD HEPATIC & PORTAL VASCULATURE CMPL 2/27/2024  12:39 PM     COMPARISON: CT abdomen and pelvis without contrast 2/23/2024.  Ultrasound abdomen with Doppler 12/14/2022.    HISTORY: Pretransplant evaluation.    TECHNIQUE: The abdomen was scanned in standard fashion with  specialized ultrasound transducer(s) using both gray-scale, color  Doppler, and spectral flow techniques. Doppler study only requested.    Findings:  Liver: Survey images show morphologic changes of cirrhosis, not fully  evaluated as a Doppler study only was ordered.    Doppler:   Extrahepatic portal vein flow is retrograde at 14 cm/s.  Right portal vein flow is  retrograde, measuring 12 cm/s.  Left portal vein flow is antegrade, measuring 19 cm/s. Left portal  vein flows into a large recanalized para umbilical vein with velocity  of 34 cm/s.    Flow in the hepatic artery is towards the liver and:  125 cm/s peak systolic  0.67 resistive index.     The splenic vein is obscured, not visualized.  The left, middle, and  right hepatic veins are patent with flow towards the IVC. The IVC is  patent with flow towards the heart. IVC is distended at 3.2 cm.   The  visualized aorta is not dilated measuring 2.6 cm.    Fluid: Small volume ascites as seen on CT      Impression    Impression:   1.  Reversal of flow in the right and main portal vein consistent with  portal venous hypertension. No thrombus identified. Left portal vein  is antegrade presumably flowing into the large recanalized para  umbilical vein also consistent portal venous hypertension.  2.  Cirrhosis, not fully evaluated as a Doppler study only was  performed.  3.  Ascites.    ALFRED WARE MD         SYSTEM ID:  [id number]   Echo Limited     Value    LVEF  60-65%    Narrative    [id number]  [id number]  [id number]  [id number]^KALA^SILVINA     [facility],[place]  Echocardiography Laboratory  [address] [place] [id number]     Name: RENETTA LAI BLANCA  MRN: [id number]  : 1970  Study Date: 2024 07:44 AM  Age: 53 yrs  Gender: Male  Patient Location: [facility] [place]  Reason For Study: Endocarditis  Ordering Physician: SILVINA ARMENDARIZ  Performed By: Carolyn Anderson RDCS     BSA: 2.1 m2  Height: 68 in  Weight: 225 lb  ______________________________________________________________________________  Procedure  Limited Echocardiogram with portions of two-dimensional, color and spectral  Doppler performed.  ______________________________________________________________________________  Interpretation Summary  No significant valvular abnormalities were noted. No vegetation or  mass  identified.  ______________________________________________________________________________  Left Ventricle  Global and regional left ventricular function is normal with an EF of 60-65%.     Right Ventricle  Right ventricular function, chamber size, wall motion, and thickness are  normal.     Mitral Valve  The mitral valve is normal.     Aortic Valve  Aortic valve is normal in structure and function.     Tricuspid Valve  The tricuspid valve is normal.     Pulmonic Valve  The pulmonic valve is normal.  ______________________________________________________________________________  Report approved by: Rosendo Durham 02/24/2024 09:41 AM     ______________________________________________________________________________          Discharge Medications   Current Discharge Medication List        START taking these medications    Details   amoxicillin (AMOXIL) 500 MG capsule Take 2 capsules (1,000 mg) by mouth every 8 hours for 7 days  Qty: 42 capsule, Refills: 0    Associated Diagnoses: Streptococcal bacteremia      insulin lispro (HUMALOG KWIKPEN) 100 UNIT/ML (1 unit dial) KWIKPEN Inject 1-10 Units Subcutaneous 3 times daily (before meals) for 50 days Correction scale: Give 1 unit insulin for every order of 35 that blood glucose level is >140 three times daily before meals and for every order of 35 that blood glucose is >200 at bedtime  Qty: 15 mL, Refills: 0    Associated Diagnoses: Type 2 diabetes mellitus with hyperglycemia, with long-term current use of insulin (H)      levofloxacin (LEVAQUIN) 500 MG tablet Take 1 tablet (500 mg) by mouth daily for 6 days  Qty: 6 tablet, Refills: 0    Associated Diagnoses: Streptococcal bacteremia      midodrine (PROAMATINE) 10 MG tablet Take 1 tablet (10 mg) by mouth every 8 hours for 30 days  Qty: 90 tablet, Refills: 0    Associated Diagnoses: Decompensated hepatic cirrhosis (H)      thiamine (B-1) 100 MG tablet Take 1 tablet (100 mg) by mouth daily for 30 days  Qty: 30  tablet, Refills: 0    Associated Diagnoses: Liver transplant candidate; Generalized muscle weakness; Severe malnutrition (H24)      traZODone (DESYREL) 50 MG tablet Take 0.5 tablets (25 mg) by mouth nightly as needed for sleep  Qty: 7 tablet, Refills: 0    Associated Diagnoses: Persistent insomnia           CONTINUE these medications which have CHANGED    Details   insulin degludec (TRESIBA FLEXTOUCH) 100 UNIT/ML pen Inject 36 Units Subcutaneous daily Inject 36 units daily.  Qty: 60 mL, Refills: 1    Associated Diagnoses: Type 2 diabetes mellitus with hyperglycemia, with long-term current use of insulin (H)      Insulin Lispro (HUMALOG KWIKPEN) 200 UNIT/ML soln Inject 8 Units Subcutaneous 4 times daily (with meals and nightly) Give 8 units before each meal. Give additional units for correction with sliding scale (1 unit for each order of 35 blood glucose >140 before meals).  Qty: 60 mL, Refills: 1    Associated Diagnoses: Type 2 diabetes mellitus with hyperglycemia, with long-term current use of insulin (H)      torsemide (DEMADEX) 10 MG tablet Take 3 tablets (30 mg) by mouth daily as needed Take as needed for fluid once daily if systolic blood pressure (top number) is 100 or greater.  Qty: 270 tablet, Refills: 0    Associated Diagnoses: Decompensated hepatic cirrhosis (H)           CONTINUE these medications which have NOT CHANGED    Details   blood glucose (NO BRAND SPECIFIED) test strip Use to test blood sugar two times daily or as directed. To accompany: Blood Glucose Monitor Brands: per insurance.  Qty: 100 strip, Refills: 6    Associated Diagnoses: Type 2 diabetes mellitus without complication, without long-term current use of insulin (H)      blood glucose monitoring (NO BRAND SPECIFIED) meter device kit Use to test blood sugar twice times daily or as directed. Preferred blood glucose meter OR supplies to accompany: Blood Glucose Monitor Brands: per insurance.  Qty: 1 kit, Refills: 0    Associated Diagnoses:  Type 2 diabetes mellitus without complication, without long-term current use of insulin (H)      Continuous Blood Gluc Sensor (DEXCOM G7 SENSOR) MISC Change every 10 days.  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), managed as type 2 (H)      folic acid (FOLVITE) 1 MG tablet Take 1 tablet (1 mg) by mouth daily  Qty: 60 tablet, Refills: 1    Associated Diagnoses: Anemia, unspecified type      insulin pen needle (BD PEN NEEDLE SHERRIE 2ND GEN) 32G X 4 MM miscellaneous USES 5 PER DAY  Qty: 100 each, Refills: 11    Associated Diagnoses: Type 2 diabetes mellitus with hyperosmolarity without coma, with long-term current use of insulin (H)      lactulose (CHRONULAC) 10 GM/15ML solution TAKE 30 MLS BY MOUTH 2 TIMES DAILY  Qty: 473 mL, Refills: 11    Associated Diagnoses: Alcoholic cirrhosis of liver without ascites (H)      melatonin 10 MG TABS tablet Take 1 tablet (10 mg) by mouth nightly as needed for sleep    Associated Diagnoses: Persistent insomnia      multivitamin w/minerals (THERA-VIT-M) tablet TAKE 1 TABLET BY MOUTH DAILY  Qty: 90 tablet, Refills: 1    Associated Diagnoses: Alcoholic cirrhosis of liver without ascites (H); Alcoholic hepatitis without ascites (H28)      omeprazole (PRILOSEC) 20 MG DR capsule Take 1 capsule (20 mg) by mouth 2 times daily  Qty: 180 capsule, Refills: 1    Associated Diagnoses: Secondary esophageal 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 (H)      potassium chloride ER (KLOR-CON M) 10 MEQ CR tablet Take 2 tablets (20 mEq) by mouth 2 times daily  Qty: 240 tablet, Refills: 1    Associated Diagnoses: Hypokalemia      rifaximin (XIFAXAN) 550 MG TABS tablet Take 1 tablet (550 mg) by mouth 2 times daily for 180 days  Qty: 120 tablet, Refills: 2    Associated Diagnoses: Hepatic encephalopathy (H)      spironolactone (ALDACTONE) 25 MG tablet Take 1 tablet (25 mg) by mouth daily  Qty: 90 tablet, Refills: 1    Associated Diagnoses: Hypokalemia; Alcoholic hepatitis  with ascites (H28)      thin (NO BRAND SPECIFIED) lancets Use with lanceting device. To accompany: Blood Glucose Monitor Brands: per insurance.  Qty: 100 each, Refills: 6    Associated Diagnoses: Type 2 diabetes mellitus without complication, without long-term current use of insulin (H)           STOP taking these medications       cyclobenzaprine (FLEXERIL) 10 MG tablet Comments:   Reason for Stopping:         doxepin (SINEQUAN) 10 MG capsule Comments:   Reason for Stopping:         HYDROcodone-acetaminophen (NORCO) 5-325 MG tablet Comments:   Reason for Stopping:         metFORMIN (GLUCOPHAGE XR) 500 MG 24 hr tablet Comments:   Reason for Stopping:             Allergies   Allergies   Allergen Reactions     Food Anaphylaxis     baked beans     Pineapple Itching

## 2024-03-01 NOTE — PROGRESS NOTES
Inpatient Diabetes Management Service: Daily Progress Note     HPI: Mary Lopez is a 53 year old male admitted on 2/23/2024 with a PMH of alcoholic cirrhosis (sober since 6/25/2023) c/b ascites, hepatic encephalopathy, grade 1 esophageal varices, transplant candidate (although inactive on list due to acute illness), and T2DM who was admitted to the ICU after presenting to the ED febrile, hypotensive, and encephalopathic. Found to have strep bovis bacteremia likely 2/2 to SBP/GI source and septic shock requiring minimal pressors, now off pressors with stable blood pressures.            Assessment/Plan:     Assessment:   Type 2 Diabetes Mellitus without known diabetes complication, sub-optimal control based on reported home glucoses 200-300s. Last A1c 4.8% (2/25/2024)  - A1c inaccurte in setting of anemia and RBC transfusions the day prior.       Plan/Recommendations:   - Lantus 32 q 24 hrs at 1600   - Novolog Meal Coverage: 1 units per 8 g CHO, TID AC and PRN with snacks/supplements   - Novolog Correction Scale: medium resistance; 1:50 >140 TID AC; 1:50 >200 HS  - Primary team started Metformin 2/29, recommend discontinuing in setting of ESLD and risk of lactic acidosis.   - BG monitoring: TID AC, HS, 0200  - Hypoglycemia protocol    - Carb counting protocol     Discussion: No fasting BG this morning, lab 181 and post-prandial reading 199 - no correction scale given. Spoke with RN, no breakfast carb coverage given as patient ate >1 hr before RN could give coverage - thinks he ate ~16 g CHO. With limited accurate glucose data and missed insulin this morning, will continue current regimen and continue to monitor. Likely discharging tomorrow.     Discharge Planning: (tentative)                  BG monitoring: TID AC, HS. Has Dexcom G7 and glucometer.  Medications: Tresiba once daily, Novolog set meal dose plus correction 1:35 >170 TID AC; 1:35 >210 HS (home correction scale).    Test Claims: requested for glucagon 2/29. - Patient's wife would like at discharge.                   Education: follows closely with outpatient diabetes educator, will not see inpatient diabetes education this admission.   Outpatient Follow-up: Scheduled 3/12/2024 with [facility] endocrinology - [address] ([place]). Patient's wife will also request follow up with Liana (diabetes educator). Educator looking into insulin pump options per patient's wife.     Please notify Inpatient Diabetes Service if changes are planned to steroids, nutrition, TPN/TF and anticipated procedures requiring prolonged NPO status.           Interval History/Review of Systems :   - The last 24 hours progress and nursing notes reviewed.  - Feeling well today without nausea or emesis.   - Per primary team, not discharging today. Likely discharge tomorrow.     Planned Procedures/Surgeries: none    Inpatient Glucose Control:       Recent Labs   Lab 03/01/24  0434 02/29/24  2211 02/29/24  1918 02/29/24  1659 02/29/24  1405 02/29/24  1355   * 213* 182* 206* 159* 164*             Medications Impacting Glycemia:   Steroids: completed 2/27   D5W containing solutions/medications: none  Other medications impacting glucose:  none        Nutrition:   Orders Placed This Encounter      Combination Diet Moderate Consistent Carb (60 g CHO per Meal) Diet; 2 gm NA Diet  Supplements: Glucerna with meals   TF: none   TPN: none         Diabetes History: see full consult note for complete diabetes history   Diabetes Type and Duration: T2DM diagnosed 10/31/2023 with A1c 8.1%.  GAD65 negative 11/3/2023, IA-2 negative 11/3/2023, C -peptide normal (3.7) 10/31/2023.      Diabetes Medications:                   1) Tresiba 36 units once daily at 7AM (previously taking 42 units, reduced 1 week prior to admission)   2) Humalog 15 units with breakfast and lunch, 13 units with dinner plus correction scale 1 unit per 35 >175 mg/dL and 1 unit per 35 >210 at bedtime.  "  Historical Diabetes Medications: none, reports outpatient diabetes educator was looking into an insulin pump.      BG monitoring device & frequency:  Dexcom G7, also uses glucometer, sometimes has issues with android and Dexcom G7.    Outpatient Diabetes Provider:  [facility] Endocrinology, Suzanne Todd ([place]). Last visit 11/28/2023  Formal Diabetes Education/Educator: Liana Garcia. Last visit 2/23/2024.         Physical Exam:   BP 97/43 (BP Location: Left arm, Patient Position: Semi-Bob's, Cuff Size: Adult Large)   Pulse 74   Temp 97.8  F (36.6  C) (Oral)   Resp 16   Ht 1.727 m (5' 8\")   Wt 110.3 kg (243 lb 2.7 oz)   SpO2 94%   BMI 36.97 kg/m    General Appearance:  NAD, resting in bed, wife at bedside.   Lungs: Breathing comfortably on room air. No cough, shortness of breath, wheezing.   Abdomen: Round, nondistended. Soft, nontender.   Extremities: + Bilateral lower extremity edema. Feet: no open sores/wounds  Skin:  Warm and dry. Jaundice.  Psych: Calm, appropriate mood and affect.           Data:     Lab Results   Component Value Date    A1C 4.8 02/25/2024    A1C 7.7 12/19/2023    A1C 8.1 10/31/2023    A1C 5.1 08/23/2023    A1C 5.7 03/12/2022       ROUTINE IP LABS (Last four results)  BMP  Recent Labs   Lab 03/01/24  0434 02/29/24  2211 02/29/24  1918 02/29/24  1659 02/29/24  0807 02/29/24  0554 02/28/24  0739 02/28/24  0541 02/27/24  0809 02/27/24  0558   *  --   --   --   --  132*  --  130*  --  129*   POTASSIUM 3.9  --   --   --   --  3.6  --  3.5  --  3.8   CHLORIDE 104  --   --   --   --  102  --  101  --  99   MEG 8.9  --   --   --   --  9.0  --  9.1  --  8.9   CO2 21*  --   --   --   --  23  --  22  --  22   BUN 22.7*  --   --   --   --  29.8*  --  36.3*  --  41.1*   CR 0.99  --   --   --   --  1.16  --  1.24*  --  1.37*   * 213* 182* 206*   < > 122*   < > 178*   < > 289*    < > = values in this interval not displayed.     CBC  Recent Labs   Lab 03/01/24  0434 02/29/24  0554 " 02/28/24  0541 02/27/24  0558   WBC 11.0 13.4* 15.7* 17.1*   RBC 2.16* 2.26* 2.38* 2.60*   HGB 7.1* 7.7* 7.8* 8.5*   HCT 21.4* 22.4* 22.8* 25.1*   MCV 99 99 96 97   MCH 32.9 34.1* 32.8 32.7   MCHC 33.2 34.4 34.2 33.9   RDW 18.0* 17.3* 17.0* 16.9*   PLT 53* 59* 59* 58*     INR  Recent Labs   Lab 03/01/24  0434 02/29/24  0554 02/28/24  0541 02/27/24  0558   INR 3.13* 3.30* 3.36* 3.28*       Inpatient Diabetes Service will continue to follow, please don't hesitate to contact the team with any questions or concerns.     Luba Dennis PA-C  Inpatient Diabetes Service  Pager: [phone]  Available on [employer]    To contact Inpatient Diabetes Service:     7 AM - 5 PM: Page the IDS JOSELYN following the patient that day (see filed or incomplete progress notes/consult notes under Endocrinology)    OR if uncertain of provider assignment: page job code [phone]    5 PM - 7 AM: First call after hours is to primary service.    For urgent after-hours questions, page job code for on call fellow: [phone]     I spent a total of 35 minutes on the date of the encounter doing prep/post-work, chart review, history and exam, documentation and further activities per the note including lab review, multidisciplinary communication, counseling the patient and/or coordinating care regarding acute hyper/hypoglycemic management, as well as discharge management and planning/communication.  See note for details.

## 2024-03-01 NOTE — PLAN OF CARE
"Goal Outcome Evaluation:  3-1130pm  Pt is on IV antibiotic once a day. PIV patent. Gets up to BR independently but needs help getting pillows under legs and arms once back to bed. Wife is at bedside and reports that pt will says he is \"Fine\" but he's really uncomfortable. Writer asked wife how she thought pt's mental status was tonight. She said Good.\" Pt was resting at the time and quiet. Wife reports patient didn't sleep well. Pt had hypoglycemia overnight. After writer left and writer gave Hs medications and did assessment Pt said he was in \"ICU\" and didn't know date. Also slow in saying his birthday. He had 3 BM's so far but writer worried that pt might need more lactulose while giving RN to RN handoff. Night MD paged for PRN [place] protocol and Charge BRIDGET Garcia updated. Insulin given with a late lunch and at HS.                      "

## 2024-03-01 NOTE — PROGRESS NOTES
[facility]    Progress Note - Medicine Service, MAROON TEAM 1       Date of Admission:  2/23/2024    Assessment & Plan   Mary Lopez is a 53 year old male admitted on 2/23/2024 with a PMH of alcoholic cirrhosis (sober since 6/25/2023) c/b ascites, hepatic encephalopathy, grade 1 esophageal varices, transplant candidate (although inactive on list due to acute illness), and T2DM who was admitted to the ICU after presenting to the ED febrile, hypotensive, and encephalopathic. Found to have strep bovis bacteremia likely 2/2 to SBP/GI source and septic shock requiring minimal pressors, now off pressors with stable blood pressures.     Updates Today  - discontinued ceftriaxone  - started amoxicillin 1 g TID and levofloxacin 500 mg daily  - check EKG tomorrow on levofloxacin  - continue insulin glargine 32 units and mealtime carb coverage  - plan likely discharge tomorrow    #Streptococcus galloylyticus (bovis) bacteremia likely 2/2 GI/SBP source (gut translocation)  #Leukocytosis, improving  #Septic Shock, resolved  Patient presented to the ED on 2/23/2024 with fevers, tachycardia, hypotension, leukocytosis, and encephalopathy. Patient given fluids, but eventually started on levophed for concerns of septic shock. Blood culture grew streptococcus galloylyticus (bovis). MRSA swab and UA negative. Source suspected to be 2/2 SBP or GI source with translocation. Patient with history of ascites and suspected SBP with last hospitalization. Unable to complete paracentesis on this admission, due to pocket being too small to complete procedure. Echo from 2/24/24 showed no concern for vegetation. Colonoscopy from 1/2/24 did not show any signs of malignancy. Patient weaned off of levophed in the ED, started on midodrine and stress dose steroids. Steroids have been slowly weaned. Blood pressure has remained stable. Does have increased leukocytosis, but notably on high dose  stress steroids that are being weaned. Leukocytosis continues to improve today and blood cultures have remained clear. Patient likely to discharge within next day or so, reached out to transplant ID who noted that he will likely switch to oral. Will follow up on recommendations.   -Transplant ID following, appreciate recs   > started amoxicillin 1 g TID and levofloxacin 500 mg daily to continue through 3/8/2024   > discontinued ceftriaxone   > EKG in AM to evaluate QTc   > ANEL is not needed (given rapid bacteremia clearance)   > monitor CBC   > gave first dose of TwinRix 2/28/2024  - continue midodrine 10 mg Q8H  - MAP goal >65     Antimicrobials:  -Cefepime (02/23-02/25)  -Vancomycin (02/23-02/25)     Cultures:  - 2/26 Bcx: pending   - 2/25 Bcx: NGTD  - 2/24 Bcx: NGTD  - 2/23 Bcx 2/2 bottles: Streptococcus gallolyticus (Streptococcus bovis group)  - 2/23 verigene: no organism detected      #Decompensated cirrhosis 2/2 Alcohol Cirrhosis MELD 3.0-34   #Alpha-1 antitrypsin MZ heterozygote, C282Y heterozygote  #Hepatic Encephalopathy  #Grade I Esophageal Varices, no Hx of variceal bleed  Patient initially presented on 2/15 for liver transplant, but found to be COVID+ and donor liver transplant was ineligible. Most recent EGD in July 2023 found to have G1 EV/GOV Type1 with no hx of bleeding. Patient with history of suspected SBP in last admission (1/2024), but did not have pocket to complete paracentesis. Patient currently on PTA lactulose and rifaximin for hepatic encephalopathy. Patient liver transplant candidate, currently inactive due to acute illness but per ID patient would be able to eligible once again  now that bacteremia has improved. Patient given albumin on 2/24 per GI recs. Patient with noted history of hepatic encephalopathy, but patient's mental status seems to be at baseline now s/p antibiotics. Patient re-listed for transplant candidacy per Hepatology on 2/28/2024.   - Hepatology Consulted, appreciate  recs  - continue pantoprazole 40 mg BID  - continue PTA Lactulose 20 g TID (goal of 3-4 BM per day)  - continue rifaximin 550mg BID  - continue PTA folate and high dose thiamine  - MELD labs daily  - restarted PTA spironolactone 25 mg daily per Hepatology to help with edema  - per Hepatology, potentially start torsemide 20 mg daily tomorrow AM  - lymphedema consult placed    MELD 3.0: 30 at 3/1/2024  4:34 AM  MELD-Na: 30 at 3/1/2024  4:34 AM  Calculated from:  Serum Creatinine: 0.99 mg/dL (Using min of 1 mg/dL) at 3/1/2024  4:34 AM  Serum Sodium: 132 mmol/L at 3/1/2024  4:34 AM  Total Bilirubin: 9.4 mg/dL at 3/1/2024  4:34 AM  Serum Albumin: 2.2 g/dL at 3/1/2024  4:34 AM  INR(ratio): 3.13 at 3/1/2024  4:34 AM  Age at listin years  Sex: Male at 3/1/2024  4:34 AM    #Acute on Chronic Metabolic Encephalopathy (HE vs Sepsis), improving  #c/f delirium  Patient with decompensated cirrhosis complicated by history of hepatic encephalopathy, who presented with altered mental status. Likely compounded by sepsis and elevated ammonium levels. Ammonia levels decreased to 46 on  from 64 on . Today, patient's mentation seems to be at baseline per wife since . Additional concerns for delirium, as it appears that patient's confusion is worse at night.   - delirium precautions  - continue PTA lactulose and rifaximin  - increased melatonin to 10 mg at bedtime  - hold PTA doxepin 10 mg daily, but will consider restarting if blood pressures remain stable    #Acute Kidney Injury, likely shock/prerenal, resolved  #hyponatremia, stable  Creatinine elevated to 2.5 upon admission (baseline 0.6-1.2). Upon transfer to medicine floor, improved to 1.48. Etiology thought likely due to pre-renal cause with hypotension/hypovolemia with septic shock. Received 2L NS in ED and albumin in ICU. Additionally, sodium low at 130, but has consistently been low throughout admission and improved from 123 upon admission. Creatinine improved  back to normal today.  - restarted PTA spironolactone 25 mg daily  - potentially start torsemide 20 mg daily tomorrow AM  - CMP daily  - daily weights  - I&Os    #Type 2 DM  #Concerning for hypoglycemic episode  #Steroid induced hyperglycemia  Prior to admission, patient was taking 36 units Tresiba and 15 units Humalog w/breakfast and lunch. 13 units of dinner. Placed on insulin drip while in ICU due to acute illness and elevated blood sugars. Transitioned off insulin drip to glargine and aspart for meal time insulin. Per ICU team, concerns that patient was being given excess insulin at home with hypoglycemic episode, diabetes education consulted while patient here. Blood sugars have improved since admission, and no longer on steroids so will continue to monitor insulin regimen.  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 7.6 upon transfer to floor team. Notes of hematoma noted at RIJ site on 2/24. Hematoma mildly tender to touch, but hemoglobin has remained stable and patient otherwise hemodynamically stable. INR elevated to 4.8, fibrogen <60. Most likely in setting of end stage liver disease. Important to r/o DIC so factor 8 assay is obtained, did not indicate DIC.Teg results consistent hypofibrinogenemia and thrombocytopenia vs platelet dysfunction. Has received various blood products to maintain lab values (Vitamin K x2, 3 units pRBC, 2 units cryo, 1 unit platelets) while in ICU.   - Transfuse to keep hemoglobin >7  - CBC  "daily  - continue to monitor hematoma for worsening bleeding, but otherwise stable    #Alcohol Use Dependence, in remission  Sober since 06/25/2023.      #Hyperkalemia, resolved  K+ elevated to 5.7 (02/24) improved to 3.8 (02/26) s/p lokelma x1.  -Trend BMP daily      #R shoulder tear-chronic  Pt had a fall in October and now has pain in shoulder. Was told not a candidate for surgery, recently started hydrocodone on 2/19.  - hold PTA hydrocodone   - Voltaren gel     #Moderate malnutrition in the context of chronic illness/disease   - Nutrition Consulted        Diet: Combination Diet Moderate Consistent Carb (60 g CHO per Meal) Diet; 2 gm NA Diet  Snacks/Supplements Adult: Glucerna; With Meals    DVT Prophylaxis: Pneumatic Compression Devices  Moore Catheter: Not present  Fluids: None  Lines: None       Cardiac Monitoring: None  Code Status: Full Code      Clinically Significant Risk Factors           # Hypercalcemia: corrected calcium is >10.1, will monitor as appropriate    # Hypoalbuminemia: Lowest albumin = 2.2 g/dL at 3/1/2024  4:34 AM, will monitor as appropriate    # Coagulation Defect: INR = 3.13 (Ref range: 0.85 - 1.15) and/or PTT = 67 Seconds (Ref range: 22 - 38 Seconds), will monitor for bleeding  # Thrombocytopenia: Lowest platelets = 53 in last 2 days, will monitor for bleeding   # Hypertension: Noted on problem list        # Obesity: Estimated body mass index is 36.97 kg/m  as calculated from the following:    Height as of this encounter: 1.727 m (5' 8\").    Weight as of this encounter: 110.3 kg (243 lb 2.7 oz).   # Moderate Malnutrition: based on nutrition assessment           Disposition Plan      Expected Discharge Date: 03/03/2024      Destination: home  Discharge Comments: Dispo: Home. Needs home PT services. Awaiting final ID plan and insulin plan, expect would be med ready soon.      The patient's care was discussed with the Attending Physician, Dr. Silva .    TALA WILKINSON MD  Medicine " Service, LEANNE TEAM 1  [facility]  Securely message with [employer] (more info)  Text page via [facility] Paging/Directory   See signed in provider for up to date coverage information  ______________________________________________________________________    Interval History   Nursing notes reviewed, no acute events overnight. Patient notes that he feels well and denies any pain anywhere. Denies abdominal pain, chest pain, dyspnea, fevers/chills, or nausea/vomiting. Feels like he is doing well enough to go home at this time. Blood sugar better controlled overnight.     Physical Exam   Vital Signs: Temp: 97.8  F (36.6  C) Temp src: Oral BP: (!) 95/38 Pulse: 80   Resp: 18 SpO2: 93 % O2 Device: None (Room air)    Weight: 243 lbs 2.68 oz    General: NAD, sitting comfortably in chair next to bed   HEENT: EOMI, PERRLA, atraumatic, scleral icterus noted, hematoma noted on RIJ site, slightly tender to touch   CV: RRR, no murmur noted  Lungs: clear to auscultation bilaterally, no wheezes or crackles noted, breathing non-labored on room air  Abdomen: soft, non-tender to palpation, non-distended, bowel sounds active  Neuro: alert and oriented to person, time. Not place, but noted to be baseline according to wife. Conversationally tangential, but he notes that he overall feels well. No asterixis or clonus.  No focal deficits noted. No asterixis.   Skin: overall appears jaundiced  Extremity: 2+ pitting edema in bilateral lower extremities    Medical Decision Making       Please see A&P for additional details of medical decision making.      Data     I have personally reviewed the following data over the past 24 hrs:    11.0  \   7.1 (L)   / 53 (L)     132 (L) 104 22.7 (H) /  199 (H)   3.9 21 (L) 0.99 \     ALT: 52 AST: 100 (H) AP: 166 (H) TBILI: 9.4 (H)   ALB: 2.2 (L) TOT PROTEIN: 5.4 (L) LIPASE: N/A     INR:  3.13 (H) PTT:  N/A   D-dimer:  N/A Fibrinogen:  N/A       Imaging results reviewed  over the past 24 hrs:   No results found for this or any previous visit (from the past 24 hour(s)).

## 2024-03-01 NOTE — PROGRESS NOTES
HEPATOLOGY PROGRESS NOTE      Date of Admission:  2/23/2024          ASSESSMENT AND RECOMMENDATIONS:     Mary Lopez is a 53 year old male with a history of alcohol (sober since 6/2023) and MASLD (metALD) cirrhosis with contributing factors of A1AT (MZ) and HFE (heterozygote C282Y) complicated by ascites, anasarca, HE who is currently listed for liver transplant (has been on hold due to recent COVID 2/15 and now with current infection) who was admitted with worsening confusion, fever, hypotension requiring pressors and worsening liver tests. Initial blood cultures with strep bovis. Now clinically improved and nearing discharge. Actively listed for liver transplant.         #Strep bovis bacteremia  - Blood cultures with strep bovis 2/2 bottles on 2/24. Follow up cultures negative. Started on abx 2/23 with cefepime. Now on ceftriaxone. Tx ID following. Per Dr. Conley, no clinical indication for ANEL.   - ID determining plan for home antibiotics, plan for 14 day course - possibly PO per patient today   - TTE 2/23 does not have evidence of vegetation.   - Colonoscopy 1/2/24 without evidence of malignancy     # ANGEL  - improving creatinine to 1.24  - recommend spironolactone 50 mg daily for fluid retention.      # MetALD cirrhosis  # heterozygous A1AT (MZ)/HFE (C282Y)  - MELD 34, ABO O. Now active for transplant with MELD 34. Did have recent COVID + (2/15), on admit was negative.   - US abd 12/14 without HCC     # Hepatic encephalopathy  -  Continue lactulose and rifaximin, titrate for ~3 BM's per day.      # Ascites  - small ascites. No safe pocket for para.   - Continue spironolactone 50 mg daily  - does have anasarca, improving with lymphedema therapy      # EV  - EGD with gr I EV and GOV2 on 7/26/23. No current sign of GIB.     # Debility  - continue PT/OT for strengthening.       Thank you for involving us in this patient's care. Please do not hesitate to contact the GI service with any questions or  "concerns.     Patient care plan discussed with Dr. Muniz, GI staff physician.    Lasha Rodriguez MD  Gastroenterology Fellow   [facility]                Interval History:   No acute events overnight. He was up walking yesterday but not yet today. Eating well. Hoping for discharge soon as he is feeling well.              Physical Exam:   BP 96/46 (BP Location: Left arm, Patient Position: Semi-Bob's, Cuff Size: Adult Large)   Pulse 76   Temp 97.4  F (36.3  C) (Oral)   Resp 16   Ht 1.727 m (5' 8\")   Wt 110.3 kg (243 lb 2.7 oz)   SpO2 95%   BMI 36.97 kg/m    Wt:   Wt Readings from Last 2 Encounters:   24 110.3 kg (243 lb 2.7 oz)   24 95.3 kg (210 lb)      Constitutional: cooperative, pleasant, not dyspneic/diaphoretic, no acute distress  Eyes: Sclera anicteric  Ears/nose/mouth/throat: Normal oropharynx without ulcers or exudate, mucus membranes moist, hearing intact  CV: mild edema in lower extremities   Respiratory: Unlabored breathing  Abdomen: nondistended, nontender.   Skin: warm, perfused, no jaundice  Neuro: AAO x 3, No asterixis  Psych: Normal affect  MSK: In bed         Data:   Labs and imaging below were independently reviewed and interpreted    MELD 3.0: 32 at 2024  5:54 AM  MELD-Na: 32 at 2024  5:54 AM  Calculated from:  Serum Creatinine: 1.16 mg/dL at 2024  5:54 AM  Serum Sodium: 132 mmol/L at 2024  5:54 AM  Total Bilirubin: 8.2 mg/dL at 2024  5:54 AM  Serum Albumin: 2.3 g/dL at 2024  5:54 AM  INR(ratio): 3.30 at 2024  5:54 AM  Age at listin years  Sex: Male at 2024  5:54 AM       BMP  Recent Labs   Lab 24  1659 24  1405 24  1355 24  0959 24  0807 24  0554 24  0739 24  0541 24  0809 24  0558 24  0433 24  0343   NA  --   --   --   --   --  132*  --  130*  --  129*  --  130*   POTASSIUM  --   --   --   --   --  3.6  --  3.5  --  3.8  --  3.8   CHLORIDE  --   --   " --   --   --  102  --  101  --  99  --  100   MEG  --   --   --   --   --  9.0  --  9.1  --  8.9  --  8.6   CO2  --   --   --   --   --  23  --  22  --  22  --  22   BUN  --   --   --   --   --  29.8*  --  36.3*  --  41.1*  --  41.6*   CR  --   --   --   --   --  1.16  --  1.24*  --  1.37*  --  1.48*   * 159* 164* 148*   < > 122*   < > 178*   < > 289*   < > 130*    < > = values in this interval not displayed.     CBC  Recent Labs   Lab 02/29/24 0554 02/28/24  0541 02/27/24  0558 02/26/24  0343   WBC 13.4* 15.7* 17.1* 22.4*   RBC 2.26* 2.38* 2.60* 2.33*   HGB 7.7* 7.8* 8.5* 7.6*   HCT 22.4* 22.8* 25.1* 22.2*   MCV 99 96 97 95   MCH 34.1* 32.8 32.7 32.6   MCHC 34.4 34.2 33.9 34.2   RDW 17.3* 17.0* 16.9* 16.6*   PLT 59* 59* 58* 58*     INR  Recent Labs   Lab 02/29/24  0554 02/28/24  0541 02/27/24  0558 02/26/24  0343   INR 3.30* 3.36* 3.28* 3.04*     LFTs  Recent Labs   Lab 02/29/24  0554 02/28/24  0541 02/27/24  0558 02/26/24  0343   ALKPHOS 194* 191* 188* 190*   * 92* 94* 92*   ALT 51 46 43 36   BILITOTAL 8.2* 8.6* 8.8* 8.4*   PROTTOTAL 5.6* 5.8* 6.2* 5.9*   ALBUMIN 2.3* 2.4* 2.4* 2.4*      PANCNo lab results found in last 7 days.

## 2024-03-01 NOTE — PLAN OF CARE
Goal Outcome Evaluation: Ongoing, progressing    Plan of Care Reviewed With: patient    Outcome Evaluation: No significant changes this shift. Pt is alert and oriented but forgetful and slow to respond. 1BM at start of the shift. Call light within reach, slept well between cares. Continue to monitor for changes.

## 2024-03-01 NOTE — TELEPHONE ENCOUNTER
Forms/Letter Request    Type of form/letter: Disability      Do we have the form/letter: Yes: [employer] Number:[id number]    Who is the form from? Insurance comp    Where did/will the form come from? form was faxed in    When is form/letter needed by: n/a    How would you like the form/letter returned: Fax : [phone]

## 2024-03-02 ENCOUNTER — MYC MEDICAL ADVICE (OUTPATIENT)
Dept: FAMILY MEDICINE | Facility: CLINIC | Age: 54
End: 2024-03-02
Payer: COMMERCIAL

## 2024-03-02 ENCOUNTER — APPOINTMENT (OUTPATIENT)
Dept: OCCUPATIONAL THERAPY | Facility: CLINIC | Age: 54
DRG: 871 | End: 2024-03-02
Payer: COMMERCIAL

## 2024-03-02 VITALS
RESPIRATION RATE: 18 BRPM | BODY MASS INDEX: 37.92 KG/M2 | WEIGHT: 250.22 LBS | TEMPERATURE: 98.2 F | SYSTOLIC BLOOD PRESSURE: 95 MMHG | DIASTOLIC BLOOD PRESSURE: 40 MMHG | HEIGHT: 68 IN | OXYGEN SATURATION: 95 % | HEART RATE: 80 BPM

## 2024-03-02 LAB
ALBUMIN SERPL BCG-MCNC: 2.5 G/DL (ref 3.5–5.2)
ALP SERPL-CCNC: 191 U/L (ref 40–150)
ALT SERPL W P-5'-P-CCNC: 50 U/L (ref 0–70)
ANION GAP SERPL CALCULATED.3IONS-SCNC: 6 MMOL/L (ref 7–15)
AST SERPL W P-5'-P-CCNC: 89 U/L (ref 0–45)
BACTERIA BLD CULT: NO GROWTH
BACTERIA BLD CULT: NO GROWTH
BILIRUB SERPL-MCNC: 9.8 MG/DL
BUN SERPL-MCNC: 19.3 MG/DL (ref 6–20)
CALCIUM SERPL-MCNC: 8.7 MG/DL (ref 8.6–10)
CHLORIDE SERPL-SCNC: 100 MMOL/L (ref 98–107)
CREAT SERPL-MCNC: 0.89 MG/DL (ref 0.67–1.17)
DEPRECATED HCO3 PLAS-SCNC: 22 MMOL/L (ref 22–29)
EGFRCR SERPLBLD CKD-EPI 2021: >90 ML/MIN/1.73M2
ERYTHROCYTE [DISTWIDTH] IN BLOOD BY AUTOMATED COUNT: 18.2 % (ref 10–15)
GLUCOSE BLDC GLUCOMTR-MCNC: 301 MG/DL (ref 70–99)
GLUCOSE SERPL-MCNC: 331 MG/DL (ref 70–99)
HCT VFR BLD AUTO: 22.2 % (ref 40–53)
HGB BLD-MCNC: 7.2 G/DL (ref 13.3–17.7)
INR PPP: 3.18 (ref 0.85–1.15)
MCH RBC QN AUTO: 32.9 PG (ref 26.5–33)
MCHC RBC AUTO-ENTMCNC: 32.4 G/DL (ref 31.5–36.5)
MCV RBC AUTO: 101 FL (ref 78–100)
PLATELET # BLD AUTO: 54 10E3/UL (ref 150–450)
POTASSIUM SERPL-SCNC: 4.4 MMOL/L (ref 3.4–5.3)
PROT SERPL-MCNC: 5.7 G/DL (ref 6.4–8.3)
RBC # BLD AUTO: 2.19 10E6/UL (ref 4.4–5.9)
SODIUM SERPL-SCNC: 128 MMOL/L (ref 135–145)
WBC # BLD AUTO: 10.2 10E3/UL (ref 4–11)

## 2024-03-02 PROCEDURE — 85610 PROTHROMBIN TIME: CPT

## 2024-03-02 PROCEDURE — 97535 SELF CARE MNGMENT TRAINING: CPT | Mod: GO | Performed by: OCCUPATIONAL THERAPIST

## 2024-03-02 PROCEDURE — 250N000013 HC RX MED GY IP 250 OP 250 PS 637: Performed by: STUDENT IN AN ORGANIZED HEALTH CARE EDUCATION/TRAINING PROGRAM

## 2024-03-02 PROCEDURE — 250N000013 HC RX MED GY IP 250 OP 250 PS 637

## 2024-03-02 PROCEDURE — 93010 ELECTROCARDIOGRAM REPORT: CPT | Performed by: INTERNAL MEDICINE

## 2024-03-02 PROCEDURE — 36415 COLL VENOUS BLD VENIPUNCTURE: CPT

## 2024-03-02 PROCEDURE — 80053 COMPREHEN METABOLIC PANEL: CPT

## 2024-03-02 PROCEDURE — 99238 HOSP IP/OBS DSCHRG MGMT 30/<: CPT | Performed by: INTERNAL MEDICINE

## 2024-03-02 PROCEDURE — 85027 COMPLETE CBC AUTOMATED: CPT

## 2024-03-02 PROCEDURE — 99232 SBSQ HOSP IP/OBS MODERATE 35: CPT | Mod: GC | Performed by: INTERNAL MEDICINE

## 2024-03-02 PROCEDURE — 93005 ELECTROCARDIOGRAM TRACING: CPT

## 2024-03-02 RX ORDER — TRAZODONE HYDROCHLORIDE 50 MG/1
25 TABLET, FILM COATED ORAL
Qty: 7 TABLET | Refills: 0 | Status: ON HOLD | OUTPATIENT
Start: 2024-03-02 | End: 2024-03-21

## 2024-03-02 RX ORDER — AMOXICILLIN 500 MG/1
1000 CAPSULE ORAL EVERY 8 HOURS
Qty: 42 CAPSULE | Refills: 0 | Status: ON HOLD | OUTPATIENT
Start: 2024-03-02 | End: 2024-03-21

## 2024-03-02 RX ORDER — INSULIN LISPRO 100 [IU]/ML
1-10 INJECTION, SOLUTION INTRAVENOUS; SUBCUTANEOUS
Qty: 15 ML | Refills: 0 | Status: ON HOLD | OUTPATIENT
Start: 2024-03-02 | End: 2024-03-21

## 2024-03-02 RX ORDER — INSULIN LISPRO 200 [IU]/ML
8 INJECTION, SOLUTION SUBCUTANEOUS
Qty: 60 ML | Refills: 1 | Status: ON HOLD | OUTPATIENT
Start: 2024-03-02 | End: 2024-03-21

## 2024-03-02 RX ORDER — INSULIN DEGLUDEC 100 U/ML
36 INJECTION, SOLUTION SUBCUTANEOUS DAILY
Qty: 60 ML | Refills: 1 | Status: ON HOLD | OUTPATIENT
Start: 2024-03-02 | End: 2024-03-21

## 2024-03-02 RX ORDER — LEVOFLOXACIN 500 MG/1
500 TABLET, FILM COATED ORAL DAILY
Qty: 6 TABLET | Refills: 0 | Status: ON HOLD | OUTPATIENT
Start: 2024-03-02 | End: 2024-03-21

## 2024-03-02 RX ORDER — MIDODRINE HYDROCHLORIDE 10 MG/1
10 TABLET ORAL EVERY 8 HOURS
Qty: 90 TABLET | Refills: 0 | Status: ON HOLD | OUTPATIENT
Start: 2024-03-02 | End: 2024-03-21

## 2024-03-02 RX ORDER — LANOLIN ALCOHOL/MO/W.PET/CERES
100 CREAM (GRAM) TOPICAL DAILY
Qty: 30 TABLET | Refills: 0 | Status: ON HOLD | OUTPATIENT
Start: 2024-03-02 | End: 2024-03-21

## 2024-03-02 RX ORDER — TORSEMIDE 10 MG/1
30 TABLET ORAL DAILY PRN
Qty: 270 TABLET | Refills: 0 | Status: ON HOLD | OUTPATIENT
Start: 2024-03-02 | End: 2024-03-21

## 2024-03-02 RX ADMIN — PANTOPRAZOLE SODIUM 40 MG: 40 TABLET, DELAYED RELEASE ORAL at 08:14

## 2024-03-02 RX ADMIN — AMOXICILLIN 1000 MG: 500 CAPSULE ORAL at 06:01

## 2024-03-02 RX ADMIN — MIDODRINE HYDROCHLORIDE 10 MG: 5 TABLET ORAL at 04:35

## 2024-03-02 RX ADMIN — LACTULOSE 20 G: 20 SOLUTION ORAL at 08:16

## 2024-03-02 RX ADMIN — THIAMINE HCL TAB 100 MG 100 MG: 100 TAB at 08:14

## 2024-03-02 RX ADMIN — RIFAXIMIN 550 MG: 550 TABLET ORAL at 08:14

## 2024-03-02 RX ADMIN — LEVOFLOXACIN 500 MG: 500 TABLET, FILM COATED ORAL at 08:14

## 2024-03-02 RX ADMIN — SPIRONOLACTONE 50 MG: 50 TABLET ORAL at 08:14

## 2024-03-02 RX ADMIN — FOLIC ACID 1 MG: 1 TABLET ORAL at 08:14

## 2024-03-02 ASSESSMENT — ACTIVITIES OF DAILY LIVING (ADL)
ADLS_ACUITY_SCORE: 35

## 2024-03-02 NOTE — PLAN OF CARE
Goal Outcome Evaluation:      Plan of Care Reviewed With: patient    Overall Patient Progress: no changeOverall Patient Progress: no change    Outcome Evaluation: Pt A&Ox3 with VSS, intermittent soft BPs. Denies pain. pt had a total of 3 BM yesterday on 3/2, 1x overnight. Possible discharge home today.

## 2024-03-02 NOTE — PROGRESS NOTES
"RN [id number]    Vital signs: BP 95/40 (BP Location: Left arm)   Pulse 80   Temp 98.2  F (36.8  C) (Oral)   Resp 18   Ht 1.727 m (5' 8\")   Wt 113.5 kg (250 lb 3.6 oz)   SpO2 95%   BMI 38.05 kg/m    Status: 53 year old male admitted on 2/23/2024 with a PMH of alcoholic cirrhosis.  Neuro: A&O x4  Pain/Nausea: denies  Cardiac: X- +3 edema in bilateral lower extremities  Respiratory: WDL  Mobility: X- generalized weakness  Diet: 2gm NA & carb counting  LDAs: All LDAs removed d/t d/c  Skin/incisions: Scattered petechiae. Bruising noted on neck, abdomen, & wrist.   GI/: WDL. Reports 2 BM this morning  Plan: Pt discharged to home at 12:30 today.    "

## 2024-03-02 NOTE — PROGRESS NOTES
Inpatient Diabetes Management Service: Daily Progress Note     HPI: Mary Lopez is a 53 year old male admitted on 2/23/2024 with a PMH of alcoholic cirrhosis (sober since 6/25/2023) c/b ascites, hepatic encephalopathy, grade 1 esophageal varices, transplant candidate (although inactive on list due to acute illness), and T2DM who was admitted to the ICU after presenting to the ED febrile, hypotensive, and encephalopathic. Found to have strep bovis bacteremia likely 2/2 to SBP/GI source and septic shock requiring minimal pressors, now off pressors with stable blood pressures.            Assessment/Plan:     Assessment:   Type 2 Diabetes Mellitus without known diabetes complication, sub-optimal control based on reported home glucoses 200-300s. Last A1c 4.8% (2/25/2024)  - A1c inaccurte in setting of anemia and RBC transfusions the day prior.       Plan/Recommendations:       Discharge Planning: (tentative)                  BG monitoring: TID AC, HS. Has Dexcom G7 and glucometer.  Medications: Resume 36 units Tresiba once daily, Novolog 8 units with  set meal dose plus correction 1:35 >170 TID AC; 1:35 >210 HS (home correction scale).   Test Claims: requested for glucagon 2/29. - Patient's wife would like at discharge.                       Please call the on call Endocrinology if sugars are persistently above 200 for the next few days.  Education: follows closely with outpatient diabetes educator, will not see inpatient diabetes education this admission.   Outpatient Follow-up: Scheduled 3/12/2024 with [facility] - [address] (ALLY). Patient's wife will also request follow up with Liana (diabetes educator). Educator looking into insulin pump options per patient's wife.     Please notify Inpatient Diabetes Service if changes are planned to steroids, nutrition, TPN/TF and anticipated procedures requiring prolonged NPO status.           Interval History/Review of Systems  ":   - The last 24 hours progress and nursing notes reviewed.  - Feeling well today without nausea or emesis.   - Per primary team, not discharging today. Likely discharge tomorrow.     Planned Procedures/Surgeries: none    Inpatient Glucose Control:               Recent Labs   Lab 03/02/24  0751 03/02/24  0435 03/01/24  2149 03/01/24  1749 03/01/24  1335 03/01/24  1030   * 331* 298* 287* 231* 199*             Medications Impacting Glycemia:   Steroids: completed 2/27   D5W containing solutions/medications: none  Other medications impacting glucose:  none        Nutrition:   Orders Placed This Encounter      Combination Diet Moderate Consistent Carb (60 g CHO per Meal) Diet; 2 gm NA Diet  Supplements: Glucerna with meals   TF: none   TPN: none         Diabetes History: see full consult note for complete diabetes history   Diabetes Type and Duration: T2DM diagnosed 10/31/2023 with A1c 8.1%.  GAD65 negative 11/3/2023, IA-2 negative 11/3/2023, C -peptide normal (3.7) 10/31/2023.      Diabetes Medications:                   1) Tresiba 36 units once daily at 7AM (previously taking 42 units, reduced 1 week prior to admission)   2) Humalog 15 units with breakfast and lunch, 13 units with dinner plus correction scale 1 unit per 35 >175 mg/dL and 1 unit per 35 >210 at bedtime.   Historical Diabetes Medications: none, reports outpatient diabetes educator was looking into an insulin pump.      BG monitoring device & frequency:  Dexcom G7, also uses glucometer, sometimes has issues with android and Dexcom G7.    Outpatient Diabetes Provider:  [facility], Suzanne Todd (ALLY). Last visit 11/28/2023  Formal Diabetes Education/Educator: Liana Garcia. Last visit 2/23/2024.         Physical Exam:   BP 95/40 (BP Location: Left arm)   Pulse 80   Temp 98.2  F (36.8  C) (Oral)   Resp 18   Ht 1.727 m (5' 8\")   Wt 109.6 kg (241 lb 11.2 oz)   SpO2 95%   BMI 36.75 kg/m    General Appearance:  NAD, resting in bed, " wife at bedside.   Lungs: Breathing comfortably on room air. No cough, shortness of breath, wheezing.   Abdomen: Round, nondistended. Soft, nontender.   Extremities: + Bilateral lower extremity edema. Feet: no open sores/wounds  Skin:  Warm and dry. Jaundice.  Psych: Calm, appropriate mood and affect.           Data:     Lab Results   Component Value Date    A1C 4.8 02/25/2024    A1C 7.7 12/19/2023    A1C 8.1 10/31/2023    A1C 5.1 08/23/2023    A1C 5.7 03/12/2022       ROUTINE IP LABS (Last four results)  BMP  Recent Labs   Lab 03/02/24  0751 03/02/24  0435 03/01/24  2149 03/01/24  1749 03/01/24  1030 03/01/24  0434 02/29/24  0807 02/29/24  0554 02/28/24  0739 02/28/24  0541   NA  --  128*  --   --   --  132*  --  132*  --  130*   POTASSIUM  --  4.4  --   --   --  3.9  --  3.6  --  3.5   CHLORIDE  --  100  --   --   --  104  --  102  --  101   MEG  --  8.7  --   --   --  8.9  --  9.0  --  9.1   CO2  --  22  --   --   --  21*  --  23  --  22   BUN  --  19.3  --   --   --  22.7*  --  29.8*  --  36.3*   CR  --  0.89  --   --   --  0.99  --  1.16  --  1.24*   * 331* 298* 287*   < > 181*   < > 122*   < > 178*    < > = values in this interval not displayed.     CBC  Recent Labs   Lab 03/02/24  0435 03/01/24 0434 02/29/24  0554 02/28/24  0541   WBC 10.2 11.0 13.4* 15.7*   RBC 2.19* 2.16* 2.26* 2.38*   HGB 7.2* 7.1* 7.7* 7.8*   HCT 22.2* 21.4* 22.4* 22.8*   * 99 99 96   MCH 32.9 32.9 34.1* 32.8   MCHC 32.4 33.2 34.4 34.2   RDW 18.2* 18.0* 17.3* 17.0*   PLT 54* 53* 59* 59*     INR  Recent Labs   Lab 03/02/24  0435 03/01/24  0434 02/29/24  0554 02/28/24  0541   INR 3.18* 3.13* 3.30* 3.36*     Patient labs, chart, and imaging reviewed.   Discussed with oncall attending.     Willis Peace MD  Endocrine Fellow  [phone]        I have seen and examined the patient, reviewed records, reviewed and edited the fellow's note.  I agree with the plan of care.    Armani Roland MD   Division of Diabetes,  Endocrinology and Metabolism  Department of Medicine       To contact Inpatient Diabetes Service:     7 AM - 5 PM: Page the IDS JOSELYN following the patient that day (see filed or incomplete progress notes/consult notes under Endocrinology)    OR if uncertain of provider assignment: page job code [phone]    5 PM - 7 AM: First call after hours is to primary service.    For urgent after-hours questions, page job code for on call fellow: [phone]

## 2024-03-02 NOTE — PLAN OF CARE
"RN [id number]    Vital signs: /49 (BP Location: Right arm)   Pulse 83   Temp 97.8  F (36.6  C) (Oral)   Resp 12   Ht 1.727 m (5' 8\")   Wt 109.6 kg (241 lb 11.2 oz)   SpO2 94%   BMI 36.75 kg/m    Status: PHM of alcoholic cirrhosis  Neuro: A&Ox4  Pain/Nausea: denies  Cardiac: WDL  Respiratory: WDL  Mobility: SBA  Diet: Carb counting/ carb correcting.   LDAs: L PIV  Skin/incisions: scattered petechiae present. Yellow sclera.   GI/: independently. Adequate output. Urine is lida in color.   New Changes: likely discharge tomorrow.   Plan: Continue with plan of care        "

## 2024-03-02 NOTE — PLAN OF CARE
Goal Outcome Evaluation:      Plan of Care Reviewed With: patient    Overall Patient Progress: improvingOverall Patient Progress: improving    Outcome Evaluation: Ready to d/c home today    Brittney Hill RN  7C RN Coordinator  Phone: [phone]  Pager: [phone]

## 2024-03-02 NOTE — CARE PLAN
Occupational Therapy Discharge Summary    Reason for therapy discharge:    Discharged to home with home therapy.    Progress towards therapy goal(s). See goals on Care Plan in [facility] electronic health record for goal details.  Goals partially met.  Barriers to achieving goals:   discharge from facility.    Therapy recommendation(s):    Continued therapy is recommended.  Rationale/Recommendations:  Home or OP PT to promote strength and endurance. Pt may benefit from OP lymphedema therapy to promote fluid reduction in BLE for improved mobility and skin integrity.

## 2024-03-02 NOTE — PROGRESS NOTES
Care Management Discharge Note    Discharge Date: 03/02/2024     Discharge Disposition: Home    Discharge Services: Home PT    Discharge DME: None    Discharge Transportation: family or friend will provide    Private pay costs discussed: Not applicable    Does the patient's insurance plan have a 3 day qualifying hospital stay waiver?  No    PAS Confirmation Code:  NA  Patient/family educated on Medicare website which has current facility and service quality ratings: no    Education Provided on the Discharge Plan: Yes  Persons Notified of Discharge Plans: pt  Patient/Family in Agreement with the Plan: yes    Handoff Referral Completed: Yes, IHO    Additional Information:    Pt is medically ready for discharge. Writer faxed discharge order and called [facility] to inform that pt is going home today with home PT. They acknowledged the information and watch for the discharge order. Wife will provide a discharge ride.    [facility]   Accepted on 02/29/2024 for PT  P: [phone]  F: [phone]    Brittney Hill RN  7C RN Coordinator  Phone: [phone]  Pager: [phone]

## 2024-03-04 ENCOUNTER — ORGAN (OUTPATIENT)
Dept: TRANSPLANT | Facility: CLINIC | Age: 54
End: 2024-03-04

## 2024-03-04 ENCOUNTER — APPOINTMENT (OUTPATIENT)
Dept: GENERAL RADIOLOGY | Facility: CLINIC | Age: 54
DRG: 005 | End: 2024-03-04
Attending: PHYSICIAN ASSISTANT
Payer: COMMERCIAL

## 2024-03-04 ENCOUNTER — ANESTHESIA EVENT (OUTPATIENT)
Dept: SURGERY | Facility: CLINIC | Age: 54
DRG: 005 | End: 2024-03-04
Payer: COMMERCIAL

## 2024-03-04 ENCOUNTER — HOSPITAL ENCOUNTER (INPATIENT)
Facility: CLINIC | Age: 54
LOS: 16 days | Discharge: HOME HEALTH | DRG: 005 | End: 2024-03-21
Attending: TRANSPLANT SURGERY | Admitting: TRANSPLANT SURGERY
Payer: COMMERCIAL

## 2024-03-04 DIAGNOSIS — Z79.4 TYPE 2 DIABETES MELLITUS WITH OTHER SPECIFIED COMPLICATION, WITH LONG-TERM CURRENT USE OF INSULIN (H): ICD-10-CM

## 2024-03-04 DIAGNOSIS — Z76.82 LIVER TRANSPLANT CANDIDATE: ICD-10-CM

## 2024-03-04 DIAGNOSIS — Z79.4 TYPE 2 DIABETES MELLITUS WITHOUT COMPLICATION, WITH LONG-TERM CURRENT USE OF INSULIN (H): Primary | Chronic | ICD-10-CM

## 2024-03-04 DIAGNOSIS — E11.69 TYPE 2 DIABETES MELLITUS WITH OTHER SPECIFIED COMPLICATION, WITH LONG-TERM CURRENT USE OF INSULIN (H): ICD-10-CM

## 2024-03-04 DIAGNOSIS — E11.9 TYPE 2 DIABETES MELLITUS WITHOUT COMPLICATION, WITH LONG-TERM CURRENT USE OF INSULIN (H): Primary | Chronic | ICD-10-CM

## 2024-03-04 DIAGNOSIS — Z94.4 LIVER REPLACED BY TRANSPLANT (H): Chronic | ICD-10-CM

## 2024-03-04 LAB
ABO/RH(D): NORMAL
ACANTHOCYTES BLD QL SMEAR: ABNORMAL
ALBUMIN SERPL BCG-MCNC: 2.6 G/DL (ref 3.5–5.2)
ALBUMIN UR-MCNC: NEGATIVE MG/DL
ALP SERPL-CCNC: 181 U/L (ref 40–150)
ALT SERPL W P-5'-P-CCNC: 50 U/L (ref 0–70)
AMYLASE SERPL-CCNC: 10 U/L (ref 28–100)
ANION GAP SERPL CALCULATED.3IONS-SCNC: 8 MMOL/L (ref 7–15)
ANTIBODY SCREEN: NEGATIVE
APPEARANCE UR: CLEAR
APTT PPP: 42 SECONDS (ref 22–38)
AST SERPL W P-5'-P-CCNC: ABNORMAL U/L
ATRIAL RATE - MUSE: 84 BPM
AUER BODIES BLD QL SMEAR: ABNORMAL
BACTERIA #/AREA URNS HPF: ABNORMAL /HPF
BASO STIPL BLD QL SMEAR: ABNORMAL
BASOPHILS # BLD AUTO: 0 10E3/UL (ref 0–0.2)
BASOPHILS NFR BLD AUTO: 0 %
BILIRUB SERPL-MCNC: 12.7 MG/DL
BILIRUB UR QL STRIP: NEGATIVE
BITE CELLS BLD QL SMEAR: ABNORMAL
BLAIMP ISLT/SPM QL: NOT DETECTED
BLAKPC ISLT/SPM QL: NOT DETECTED
BLAOXA-48 ISLT/SPM QL: NOT DETECTED
BLAVIM ISLT/SPM QL: NOT DETECTED
BLD PROD TYP BPU: NORMAL
BLISTER CELLS BLD QL SMEAR: ABNORMAL
BLOOD COMPONENT TYPE: NORMAL
BUN SERPL-MCNC: 17.5 MG/DL (ref 6–20)
BURR CELLS BLD QL SMEAR: ABNORMAL
CALCIUM SERPL-MCNC: 8.7 MG/DL (ref 8.6–10)
CHLORIDE SERPL-SCNC: 96 MMOL/L (ref 98–107)
CMV IGG SERPL IA-ACNC: <0.2 U/ML
CMV IGG SERPL IA-ACNC: NORMAL
CMV IGM SERPL IA-ACNC: <8 AU/ML
CMV IGM SERPL IA-ACNC: NEGATIVE
CODING SYSTEM: NORMAL
COLOR UR AUTO: YELLOW
CREAT SERPL-MCNC: 1.08 MG/DL (ref 0.67–1.17)
CROSSMATCH: NORMAL
DACRYOCYTES BLD QL SMEAR: ABNORMAL
DEPRECATED HCO3 PLAS-SCNC: 25 MMOL/L (ref 22–29)
DIASTOLIC BLOOD PRESSURE - MUSE: NORMAL MMHG
EBV VCA IGG SER IA-ACNC: >750 U/ML
EBV VCA IGG SER IA-ACNC: POSITIVE
EBV VCA IGM SER IA-ACNC: 19.2 U/ML
EBV VCA IGM SER IA-ACNC: NORMAL
EGFRCR SERPLBLD CKD-EPI 2021: 83 ML/MIN/1.73M2
ELLIPTOCYTES BLD QL SMEAR: ABNORMAL
EOSINOPHIL # BLD AUTO: 0.2 10E3/UL (ref 0–0.7)
EOSINOPHIL NFR BLD AUTO: 2 %
ERYTHROCYTE [DISTWIDTH] IN BLOOD BY AUTOMATED COUNT: 18.6 % (ref 10–15)
FIBRINOGEN PPP-MCNC: 87 MG/DL (ref 170–490)
FRAGMENTS BLD QL SMEAR: ABNORMAL
GLUCOSE BLDC GLUCOMTR-MCNC: 186 MG/DL (ref 70–99)
GLUCOSE BLDC GLUCOMTR-MCNC: 312 MG/DL (ref 70–99)
GLUCOSE BLDC GLUCOMTR-MCNC: 401 MG/DL (ref 70–99)
GLUCOSE BLDC GLUCOMTR-MCNC: 411 MG/DL (ref 70–99)
GLUCOSE BLDC GLUCOMTR-MCNC: 432 MG/DL (ref 70–99)
GLUCOSE BLDC GLUCOMTR-MCNC: 457 MG/DL (ref 70–99)
GLUCOSE BLDC GLUCOMTR-MCNC: 470 MG/DL (ref 70–99)
GLUCOSE BLDC GLUCOMTR-MCNC: 99 MG/DL (ref 70–99)
GLUCOSE SERPL-MCNC: 392 MG/DL (ref 70–99)
GLUCOSE UR STRIP-MCNC: 500 MG/DL
HBV CORE AB SERPL QL IA: NONREACTIVE
HBV SURFACE AB SERPL IA-ACNC: 5.26 M[IU]/ML
HBV SURFACE AB SERPL IA-ACNC: NONREACTIVE M[IU]/ML
HBV SURFACE AG SERPL QL IA: NONREACTIVE
HCT VFR BLD AUTO: 22.6 % (ref 40–53)
HCV AB SERPL QL IA: NONREACTIVE
HGB BLD-MCNC: 7.4 G/DL (ref 13.3–17.7)
HGB C CRYSTALS: ABNORMAL
HGB UR QL STRIP: NEGATIVE
HIV 1+2 AB+HIV1 P24 AG SERPL QL IA: NONREACTIVE
HOWELL-JOLLY BOD BLD QL SMEAR: ABNORMAL
HYALINE CASTS: 4 /LPF
IMM GRANULOCYTES # BLD: 0.2 10E3/UL
IMM GRANULOCYTES NFR BLD: 3 %
INR PPP: 2.76 (ref 0.85–1.15)
INTERPRETATION ECG - MUSE: NORMAL
ISSUE DATE AND TIME: NORMAL
KETONES UR STRIP-MCNC: NEGATIVE MG/DL
LEUKOCYTE ESTERASE UR QL STRIP: NEGATIVE
LYMPHOCYTES # BLD AUTO: 0.9 10E3/UL (ref 0.8–5.3)
LYMPHOCYTES NFR BLD AUTO: 12 %
MAGNESIUM SERPL-MCNC: 1.1 MG/DL (ref 1.7–2.3)
MCH RBC QN AUTO: 33.3 PG (ref 26.5–33)
MCHC RBC AUTO-ENTMCNC: 32.7 G/DL (ref 31.5–36.5)
MCV RBC AUTO: 102 FL (ref 78–100)
MONOCYTES # BLD AUTO: 0.9 10E3/UL (ref 0–1.3)
MONOCYTES NFR BLD AUTO: 12 %
MUCOUS THREADS #/AREA URNS LPF: PRESENT /LPF
NDM TARGET DNA: NOT DETECTED
NEUTROPHILS # BLD AUTO: 5.3 10E3/UL (ref 1.6–8.3)
NEUTROPHILS NFR BLD AUTO: 71 %
NEUTS HYPERSEG BLD QL SMEAR: ABNORMAL
NITRATE UR QL: NEGATIVE
NRBC # BLD AUTO: 0 10E3/UL
NRBC BLD AUTO-RTO: 0 /100
P AXIS - MUSE: 32 DEGREES
PH UR STRIP: 5 [PH] (ref 5–7)
PHOSPHATE SERPL-MCNC: 2.3 MG/DL (ref 2.5–4.5)
PLAT MORPH BLD: ABNORMAL
PLATELET # BLD AUTO: 56 10E3/UL (ref 150–450)
POLYCHROMASIA BLD QL SMEAR: ABNORMAL
POTASSIUM SERPL-SCNC: 4.3 MMOL/L (ref 3.4–5.3)
PR INTERVAL - MUSE: 146 MS
PROT SERPL-MCNC: 6.2 G/DL (ref 6.4–8.3)
QRS DURATION - MUSE: 92 MS
QT - MUSE: 376 MS
QTC - MUSE: 444 MS
R AXIS - MUSE: 5 DEGREES
RBC # BLD AUTO: 2.22 10E6/UL (ref 4.4–5.9)
RBC AGGLUT BLD QL: ABNORMAL
RBC MORPH BLD: ABNORMAL
RBC URINE: <1 /HPF
ROULEAUX BLD QL SMEAR: ABNORMAL
SARS-COV-2 RNA RESP QL NAA+PROBE: NEGATIVE
SICKLE CELLS BLD QL SMEAR: ABNORMAL
SMUDGE CELLS BLD QL SMEAR: ABNORMAL
SODIUM SERPL-SCNC: 129 MMOL/L (ref 135–145)
SP GR UR STRIP: 1.01 (ref 1–1.03)
SPECIMEN EXPIRATION DATE: NORMAL
SPHEROCYTES BLD QL SMEAR: ABNORMAL
STOMATOCYTES BLD QL SMEAR: ABNORMAL
SYSTOLIC BLOOD PRESSURE - MUSE: NORMAL MMHG
T AXIS - MUSE: 62 DEGREES
TARGETS BLD QL SMEAR: ABNORMAL
TOXIC GRANULES BLD QL SMEAR: ABNORMAL
UNIT ABO/RH: NORMAL
UNIT NUMBER: NORMAL
UNIT STATUS: NORMAL
UNIT TYPE ISBT: 5100
UNIT TYPE ISBT: 7300
UROBILINOGEN UR STRIP-MCNC: NORMAL MG/DL
VARIANT LYMPHS BLD QL SMEAR: ABNORMAL
VENTRICULAR RATE- MUSE: 84 BPM
WBC # BLD AUTO: 7.5 10E3/UL (ref 4–11)
WBC URINE: <1 /HPF

## 2024-03-04 PROCEDURE — 86704 HEP B CORE ANTIBODY TOTAL: CPT | Performed by: PHYSICIAN ASSISTANT

## 2024-03-04 PROCEDURE — 86665 EPSTEIN-BARR CAPSID VCA: CPT | Performed by: PHYSICIAN ASSISTANT

## 2024-03-04 PROCEDURE — 87040 BLOOD CULTURE FOR BACTERIA: CPT | Performed by: PHYSICIAN ASSISTANT

## 2024-03-04 PROCEDURE — 86923 COMPATIBILITY TEST ELECTRIC: CPT

## 2024-03-04 PROCEDURE — 250N000011 HC RX IP 250 OP 636: Mod: JZ | Performed by: PHYSICIAN ASSISTANT

## 2024-03-04 PROCEDURE — 86644 CMV ANTIBODY: CPT | Performed by: PHYSICIAN ASSISTANT

## 2024-03-04 PROCEDURE — 85025 COMPLETE CBC W/AUTO DIFF WBC: CPT | Performed by: PHYSICIAN ASSISTANT

## 2024-03-04 PROCEDURE — 250N000011 HC RX IP 250 OP 636: Performed by: TRANSPLANT SURGERY

## 2024-03-04 PROCEDURE — 87635 SARS-COV-2 COVID-19 AMP PRB: CPT | Performed by: PHYSICIAN ASSISTANT

## 2024-03-04 PROCEDURE — 82962 GLUCOSE BLOOD TEST: CPT

## 2024-03-04 PROCEDURE — 99207 PR NO BILLABLE SERVICE THIS VISIT: CPT | Performed by: TRANSPLANT SURGERY

## 2024-03-04 PROCEDURE — 36415 COLL VENOUS BLD VENIPUNCTURE: CPT | Performed by: PHYSICIAN ASSISTANT

## 2024-03-04 PROCEDURE — 85610 PROTHROMBIN TIME: CPT | Performed by: PHYSICIAN ASSISTANT

## 2024-03-04 PROCEDURE — 87389 HIV-1 AG W/HIV-1&-2 AB AG IA: CPT | Performed by: PHYSICIAN ASSISTANT

## 2024-03-04 PROCEDURE — 99207 PR NO CHARGE LOS: CPT | Performed by: TRANSPLANT SURGERY

## 2024-03-04 PROCEDURE — 258N000003 HC RX IP 258 OP 636: Performed by: PHYSICIAN ASSISTANT

## 2024-03-04 PROCEDURE — 87798 DETECT AGENT NOS DNA AMP: CPT | Performed by: INTERNAL MEDICINE

## 2024-03-04 PROCEDURE — 250N000013 HC RX MED GY IP 250 OP 250 PS 637: Performed by: PHYSICIAN ASSISTANT

## 2024-03-04 PROCEDURE — 80069 RENAL FUNCTION PANEL: CPT | Performed by: PHYSICIAN ASSISTANT

## 2024-03-04 PROCEDURE — 81003 URINALYSIS AUTO W/O SCOPE: CPT | Performed by: PHYSICIAN ASSISTANT

## 2024-03-04 PROCEDURE — 83735 ASSAY OF MAGNESIUM: CPT | Performed by: PHYSICIAN ASSISTANT

## 2024-03-04 PROCEDURE — 85384 FIBRINOGEN ACTIVITY: CPT | Performed by: PHYSICIAN ASSISTANT

## 2024-03-04 PROCEDURE — 71046 X-RAY EXAM CHEST 2 VIEWS: CPT | Mod: 26 | Performed by: RADIOLOGY

## 2024-03-04 PROCEDURE — 86900 BLOOD TYPING SEROLOGIC ABO: CPT | Performed by: PHYSICIAN ASSISTANT

## 2024-03-04 PROCEDURE — 87340 HEPATITIS B SURFACE AG IA: CPT | Performed by: PHYSICIAN ASSISTANT

## 2024-03-04 PROCEDURE — 85730 THROMBOPLASTIN TIME PARTIAL: CPT | Performed by: PHYSICIAN ASSISTANT

## 2024-03-04 PROCEDURE — 87181 SC STD AGAR DILUTION PER AGT: CPT | Performed by: PHYSICIAN ASSISTANT

## 2024-03-04 PROCEDURE — 84460 ALANINE AMINO (ALT) (SGPT): CPT | Performed by: PHYSICIAN ASSISTANT

## 2024-03-04 PROCEDURE — 82150 ASSAY OF AMYLASE: CPT | Performed by: PHYSICIAN ASSISTANT

## 2024-03-04 PROCEDURE — 86923 COMPATIBILITY TEST ELECTRIC: CPT | Performed by: STUDENT IN AN ORGANIZED HEALTH CARE EDUCATION/TRAINING PROGRAM

## 2024-03-04 PROCEDURE — 999N000127 HC STATISTIC PERIPHERAL IV START W US GUIDANCE

## 2024-03-04 PROCEDURE — 87086 URINE CULTURE/COLONY COUNT: CPT | Performed by: PHYSICIAN ASSISTANT

## 2024-03-04 PROCEDURE — 93005 ELECTROCARDIOGRAM TRACING: CPT

## 2024-03-04 PROCEDURE — 87516 HEPATITIS B DNA AMP PROBE: CPT | Performed by: PHYSICIAN ASSISTANT

## 2024-03-04 PROCEDURE — 86645 CMV ANTIBODY IGM: CPT | Performed by: PHYSICIAN ASSISTANT

## 2024-03-04 PROCEDURE — 250N000009 HC RX 250: Performed by: PHYSICIAN ASSISTANT

## 2024-03-04 PROCEDURE — 258N000003 HC RX IP 258 OP 636: Performed by: TRANSPLANT SURGERY

## 2024-03-04 PROCEDURE — 86706 HEP B SURFACE ANTIBODY: CPT | Performed by: PHYSICIAN ASSISTANT

## 2024-03-04 PROCEDURE — 86803 HEPATITIS C AB TEST: CPT | Performed by: PHYSICIAN ASSISTANT

## 2024-03-04 PROCEDURE — 71046 X-RAY EXAM CHEST 2 VIEWS: CPT

## 2024-03-04 RX ORDER — MAGNESIUM SULFATE HEPTAHYDRATE 40 MG/ML
4 INJECTION, SOLUTION INTRAVENOUS ONCE
Status: COMPLETED | OUTPATIENT
Start: 2024-03-04 | End: 2024-03-04

## 2024-03-04 RX ORDER — DEXTROSE MONOHYDRATE 25 G/50ML
25-50 INJECTION, SOLUTION INTRAVENOUS
Status: DISCONTINUED | OUTPATIENT
Start: 2024-03-04 | End: 2024-03-04

## 2024-03-04 RX ORDER — LIDOCAINE 40 MG/G
CREAM TOPICAL
Status: DISCONTINUED | OUTPATIENT
Start: 2024-03-04 | End: 2024-03-05 | Stop reason: HOSPADM

## 2024-03-04 RX ORDER — DEXTROSE MONOHYDRATE 100 MG/ML
INJECTION, SOLUTION INTRAVENOUS CONTINUOUS PRN
Status: DISCONTINUED | OUTPATIENT
Start: 2024-03-04 | End: 2024-03-21 | Stop reason: HOSPADM

## 2024-03-04 RX ORDER — MIDODRINE HYDROCHLORIDE 5 MG/1
10 TABLET ORAL
Status: COMPLETED | OUTPATIENT
Start: 2024-03-04 | End: 2024-03-04

## 2024-03-04 RX ORDER — PIPERACILLIN SODIUM, TAZOBACTAM SODIUM 3; .375 G/15ML; G/15ML
3.38 INJECTION, POWDER, LYOPHILIZED, FOR SOLUTION INTRAVENOUS
Status: DISCONTINUED | OUTPATIENT
Start: 2024-03-04 | End: 2024-03-05 | Stop reason: HOSPADM

## 2024-03-04 RX ORDER — FLUCONAZOLE 2 MG/ML
400 INJECTION, SOLUTION INTRAVENOUS ONCE
Status: DISCONTINUED | OUTPATIENT
Start: 2024-03-04 | End: 2024-03-05 | Stop reason: HOSPADM

## 2024-03-04 RX ORDER — DEXTROSE MONOHYDRATE 25 G/50ML
25-50 INJECTION, SOLUTION INTRAVENOUS
Status: DISCONTINUED | OUTPATIENT
Start: 2024-03-04 | End: 2024-03-21 | Stop reason: HOSPADM

## 2024-03-04 RX ORDER — HYDROCODONE BITARTRATE AND ACETAMINOPHEN 5; 325 MG/1; MG/1
1 TABLET ORAL EVERY 6 HOURS PRN
Status: ON HOLD | COMMUNITY
End: 2024-03-21

## 2024-03-04 RX ORDER — PIPERACILLIN SODIUM, TAZOBACTAM SODIUM 3; .375 G/15ML; G/15ML
3.38 INJECTION, POWDER, LYOPHILIZED, FOR SOLUTION INTRAVENOUS EVERY 6 HOURS
Status: DISCONTINUED | OUTPATIENT
Start: 2024-03-04 | End: 2024-03-05

## 2024-03-04 RX ORDER — ACETAMINOPHEN 325 MG/1
975 TABLET ORAL ONCE
Status: DISCONTINUED | OUTPATIENT
Start: 2024-03-04 | End: 2024-03-05 | Stop reason: HOSPADM

## 2024-03-04 RX ORDER — NICOTINE POLACRILEX 4 MG
15-30 LOZENGE BUCCAL
Status: DISCONTINUED | OUTPATIENT
Start: 2024-03-04 | End: 2024-03-04

## 2024-03-04 RX ORDER — MIDODRINE HYDROCHLORIDE 5 MG/1
10 TABLET ORAL EVERY 8 HOURS
Status: DISCONTINUED | OUTPATIENT
Start: 2024-03-04 | End: 2024-03-04

## 2024-03-04 RX ORDER — NICOTINE POLACRILEX 4 MG
15-30 LOZENGE BUCCAL
Status: DISCONTINUED | OUTPATIENT
Start: 2024-03-04 | End: 2024-03-21 | Stop reason: HOSPADM

## 2024-03-04 RX ORDER — PIPERACILLIN SODIUM, TAZOBACTAM SODIUM 3; .375 G/15ML; G/15ML
3.38 INJECTION, POWDER, LYOPHILIZED, FOR SOLUTION INTRAVENOUS ONCE
Status: DISCONTINUED | OUTPATIENT
Start: 2024-03-04 | End: 2024-03-05 | Stop reason: HOSPADM

## 2024-03-04 RX ORDER — NOREPINEPHRINE BITARTRATE 0.06 MG/ML
.01-.6 INJECTION, SOLUTION INTRAVENOUS CONTINUOUS
Status: DISCONTINUED | OUTPATIENT
Start: 2024-03-04 | End: 2024-03-05 | Stop reason: HOSPADM

## 2024-03-04 RX ADMIN — MAGNESIUM SULFATE HEPTAHYDRATE 4 G: 40 INJECTION, SOLUTION INTRAVENOUS at 13:59

## 2024-03-04 RX ADMIN — PIPERACILLIN AND TAZOBACTAM 3.38 G: 3; .375 INJECTION, POWDER, FOR SOLUTION INTRAVENOUS at 16:49

## 2024-03-04 RX ADMIN — MIDODRINE HYDROCHLORIDE 10 MG: 5 TABLET ORAL at 18:30

## 2024-03-04 RX ADMIN — MICAFUNGIN SODIUM 100 MG: 50 INJECTION, POWDER, LYOPHILIZED, FOR SOLUTION INTRAVENOUS at 14:00

## 2024-03-04 RX ADMIN — Medication 1250 MG: at 12:46

## 2024-03-04 RX ADMIN — PIPERACILLIN AND TAZOBACTAM 3.38 G: 3; .375 INJECTION, POWDER, FOR SOLUTION INTRAVENOUS at 12:11

## 2024-03-04 RX ADMIN — PIPERACILLIN AND TAZOBACTAM 3.38 G: 3; .375 INJECTION, POWDER, FOR SOLUTION INTRAVENOUS at 22:06

## 2024-03-04 RX ADMIN — Medication 1250 MG: at 23:04

## 2024-03-04 RX ADMIN — INSULIN HUMAN 5.5 UNITS/HR: 1 INJECTION, SOLUTION INTRAVENOUS at 16:55

## 2024-03-04 ASSESSMENT — ACTIVITIES OF DAILY LIVING (ADL)
DEPENDENT_IADLS:: TRANSPORTATION
ADLS_ACUITY_SCORE: 38
ADLS_ACUITY_SCORE: 40
ADLS_ACUITY_SCORE: 40
ADLS_ACUITY_SCORE: 38
ADLS_ACUITY_SCORE: 40
ADLS_ACUITY_SCORE: 38
ADLS_ACUITY_SCORE: 40
ADLS_ACUITY_SCORE: 40
ADLS_ACUITY_SCORE: 38
ADLS_ACUITY_SCORE: 38

## 2024-03-04 ASSESSMENT — LIFESTYLE VARIABLES: TOBACCO_USE: 1

## 2024-03-04 NOTE — PLAN OF CARE
Physical Therapy Discharge Summary    Reason for therapy discharge:    Discharged to home with home therapy.    Progress towards therapy goal(s). See goals on Care Plan in [facility] electronic health record for goal details.  Goals partially met.  Barriers to achieving goals:   discharge from facility.    Therapy recommendation(s):    Continued therapy is recommended.  Rationale/Recommendations:  Recommend continued HHPT to increase safety and independence with mobility. .

## 2024-03-04 NOTE — PHARMACY-VANCOMYCIN DOSING SERVICE
"Pharmacy Vancomycin Initial Note  Date of Service 2024  Patient's  1970  53 year old, male    Indication: Bacteremia and Surgical Prophylaxis    Current estimated CrCl = Estimated Creatinine Clearance: 115.5 mL/min (based on SCr of 0.89 mg/dL).    Creatinine for last 3 days  3/2/2024:  4:35 AM Creatinine 0.89 mg/dL    Recent Vancomycin Level(s) for last 3 days  No results found for requested labs within last 3 days.      Vancomycin IV Administrations (past 72 hours)        No vancomycin orders with administrations in past 72 hours.                    Nephrotoxins and other renal medications (From now, onward)      Start     Dose/Rate Route Frequency Ordered Stop    24 1100  piperacillin-tazobactam (ZOSYN) 3.375 g vial to attach to  mL bag        Note to Pharmacy: For [facility], [facility] and [facility]: For Zosyn-naive patients, use the \"Zosyn initial dose + extended infusion\" order panel.    3.375 g  over 30 Minutes Intravenous EVERY 6 HOURS 24 1034      24 1100  vancomycin (VANCOCIN) 1,250 mg in 0.9% NaCl 250 mL intermittent infusion         1,250 mg  over 90 Minutes Intravenous EVERY 12 HOURS 24 1037              Contrast Orders - past 72 hours (72h ago, onward)      None            [employer] Prediction of Planned Initial Vancomycin Regimen      Regimen: 1250 mg IV every 12 hours.  Start time: 10:36 on 2024  Exposure target: AUC24 (range)400-600 mg/L.hr   AUC24,ss: 462 mg/L.hr  Probability of AUC24 > 400: 66 %  Ctrough,ss: 14.5 mg/L  Probability of Ctrough,ss > 20: 23 %  Probability of nephrotoxicity (Lodise SCOTT ): 10 %          Plan:  Start vancomycin  1250 mg IV q12h.   Vancomycin monitoring method: AUC  Vancomycin therapeutic monitoring goal: 400-600 mg*h/L  Pharmacy will check vancomycin levels as appropriate doreen-operatively    Serum creatinine levels will be ordered daily for the first week of therapy and at least twice weekly for subsequent weeks.      [place]ell, " RPH

## 2024-03-04 NOTE — ANESTHESIA PREPROCEDURE EVALUATION
Anesthesia Pre-Procedure Evaluation    Patient: Mary Lopez   MRN: [id number] : 1970        Procedure : Procedure(s):  Transplant liver recipient  donor          Past Medical History:   Diagnosis Date    ANGEL (acute kidney injury) (H24)     Alcoholic cirrhosis of liver without ascites (H) 2023    Alcoholic hepatitis with ascites (H28) 10/03/2023    Alcoholic hepatitis without ascites (H28) 2023    Closed fracture of one rib of left side 2023    Concussion without loss of consciousness 2020    Decompensated hepatic cirrhosis (H) 09/15/2023    Diabetes mellitus, type 2 (H) 2023    Essential hypertension 2020    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)     Mild hyperlipidemia 2021    Persistent insomnia 2023    Portal hypertension (H) 2023    Scrotal abscess     Secondary esophageal 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 (H) 2023    Tobacco abuse disorder 2020    Type 2 diabetes mellitus with hyperglycemia (H) 2023      Past Surgical History:   Procedure Laterality Date    CHOLECYSTECTOMY      COLONOSCOPY N/A 2024    Procedure: COLONOSCOPY, WITH POLYPECTOMY;  Surgeon: Jak Urbina MD;  Location: [facility]    CV RIGHT HEART CATH MEASUREMENTS RECORDED N/A 2024    Procedure: Right Heart Catheterization;  Surgeon: Alfred Tafoya MD;  Location:  HEART CARDIAC CATH LAB    TONSILLECTOMY      VASECTOMY        Allergies   Allergen Reactions    Other Food Allergy Anaphylaxis     Legumes (black beans, baked beans, chickpeas)    Pineapple Itching      Social History     Tobacco Use    Smoking status: Former     Types: Cigarettes     Passive exposure: Never    Smokeless tobacco: Current     Types: Chew     Last attempt to quit: 2004    Tobacco comments:     Chew daily 1/3 of a tin per day   Substance Use Topics    Alcohol use: Not Currently     Alcohol/week: 12.0 standard drinks of alcohol     Types: 12 Standard  drinks or equivalent per week     Comment: Sober since 2023      Wt Readings from Last 1 Encounters:   24 107.2 kg (236 lb 4.8 oz)        Pt is a 53 year old male with a history of EtOH cirrhosis, complicated by encephalopathy, hyponatremia, chronic thrombocytopenia, macrocytic anemia, with recent hospitalization (2024) for encephalopathy and suspected SBP. Also with a history of type 2 diabetes mellitus. Deferred from liver transplant 2/15 due to positive covid test. Recently admitted -3/2 for Strep bovis bacteremia, he was temporarily inactivated on the liver transplant wait list, but was reactivated as of 24. He was ultimately discharged on oral amoxicillin and levaquin with planned stop date of 3/8/24. He now presents as a candidate for possible  donor liver transplant.     Anesthesia Evaluation   Pt has had prior anesthetic. Type: MAC and General.    No history of anesthetic complications       ROS/MED HX  ENT/Pulmonary:     (+)                tobacco use, Past use,                       Neurologic:       Cardiovascular:     (+) Dyslipidemia hypertension- -   -  - -                                 Previous cardiac testing   Echo: Date: 2023 Results:  [facility]  Diagnostic and Treatment-3rd Floor  [address] [place] [id number]     Name: RENETTA LAI BLANCA  MRN: [id number]  : 1970  Study Date: 2023 12:28 PM  Age: 53 yrs  Gender: Male  Patient Location: [facility]  Reason For Study: Alcoholic cirrhosis of liver with ascites (H), Portal  hypertensi  Ordering Physician: JOSTIN ABRAHAM  Referring Physician: JOSTIN ABRAHAM  Performed By: Becki Ba     BSA: 2.2 m2  Height: 69 in  Weight: 230 lb  HR: 94  BP: 118/68 mmHg  ______________________________________________________________________________  Procedure  Echocardiogram with two-dimensional, color and spectral Doppler  performed.  ______________________________________________________________________________  Interpretation Summary  Global and regional left ventricular function is normal with an EF of 60-65%.  Global right ventricular function is normal.  Mild tricuspid and mitral insufficiency present.  The right ventricular systolic pressure is approximated at 37 mmHg (upper  limits of normal).  Estimated mean right atrial pressure is normal.  Trivial pericardial effusion is present.  Ascites is noted.  ______________________________________________________________________________  Left Ventricle  Left ventricular size is normal. Left ventricular wall thickness is normal.  Global and regional left ventricular function is normal with an EF of 60-65%.  Left ventricular diastolic function is indeterminate. No regional wall motion  abnormalities are seen.     Right Ventricle  The right ventricle is normal size. Global right ventricular function is  normal.     Atria  The right atria appears normal. Mild left atrial enlargement is present. The  atrial septum is intact as assessed by color Doppler .     Mitral Valve  Mild mitral insufficiency is present.     Aortic Valve  Aortic valve is normal in structure and function.     Tricuspid Valve  Mild tricuspid insufficiency is present. The right ventricular systolic  pressure is approximated at 33.6 mmHg plus the right atrial pressure.     Pulmonic Valve  The pulmonic valve is normal.     Vessels  The aorta root is normal. The inferior vena cava is normal. Estimated mean  right atrial pressure is normal.     Pericardium  Trivial pericardial effusion is present.     Miscellaneous  Ascites is noted.     Compared to Previous Study  Previous study not available for comparison.  ______________________________________________________________________________  MMode/2D Measurements & Calculations  IVSd: 0.93 cm  LVIDd: 5.8 cm  LVIDs: 3.9 cm  LVPWd: 0.84 cm  FS: 32.6 %  LV mass(C)d: 199.3  grams  LV mass(C)dI: 90.9 grams/m2  Ao root diam: 3.7 cm  LA dimension: 4.0 cm  asc Aorta Diam: 3.5 cm  LA/Ao: 1.1  LVOT diam: 2.4 cm  LVOT area: 4.7 cm2  Ao root diam index Ht(cm/m): 2.1  Ao root diam index BSA (cm/m2): 1.7  Asc Ao diam index BSA (cm/m2): 1.6  Asc Ao diam index Ht(cm/m): 2.0  LA Volume (BP): 86.2 ml     LA Volume Index (BP): 39.4 ml/m2  RV Base: 4.5 cm  RWT: 0.29  TAPSE: 2.5 cm     Doppler Measurements & Calculations  MV E max kristopher: 92.2 cm/sec  MV A max kristopher: 81.9 cm/sec  MV E/A: 1.1  MV max P.9 mmHg  MV mean P.3 mmHg  MV V2 VTI: 25.2 cm  MVA(VTI): 5.1 cm2  MV dec time: 0.16 sec  Ao V2 max: 173.7 cm/sec  Ao max P.1 mmHg  Ao V2 mean: 121.0 cm/sec  Ao mean P.7 mmHg  Ao V2 VTI: 35.2 cm  DRAKE(I,D): 3.6 cm2  DRAKE(V,D): 4.4 cm2  LV V1 max PG: 10.3 mmHg  LV V1 max: 160.6 cm/sec  LV V1 VTI: 27.0 cm  SV(LVOT): 127.4 ml  SI(LVOT): 58.1 ml/m2     PA V2 max: 109.7 cm/sec  PA max P.9 mmHg  PA acc time: 0.09 sec  TR max kristopher: 289.9 cm/sec  TR max P.6 mmHg  AV Kristopher Ratio (DI): 0.92  DRAKE Index (cm2/m2): 1.7  E/E' av.4  Lateral E/e': 7.1  Medial E/e': 7.7  RV S Kristopher: 22.3 cm/sec     ______________________________________________________________________________  Report approved by: Rosendo Thorpe 2023 01:11 PM    Stress Test:  Date: Results:    ECG Reviewed:  Date: Results:    Cath:  Date: Results:      METS/Exercise Tolerance:     Hematologic: Comments: Thrombocytopenia    (+)      anemia,          Musculoskeletal:       GI/Hepatic: Comment: Decompensated hepatic cirrhosis  Gastric varices  Portal hypertensive gastropathy  Secondary esophageal 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  Alcoholic hepatitis with ascites  Severe malnutrition    (+)   esophageal disease, Varices,       hepatitis type Alcoholic, liver disease,       Renal/Genitourinary: Comment: acute kidney injury hx    (+) renal disease, type: ARF,            Endo: Comment: BMI: 31.22    (+)  type II DM, Last HgA1c: 7.7, date:  "12/19/2023, Using insulin,          Obesity,       Psychiatric/Substance Use: Comment: Alcohol use disorder, severe, in early remission        (+)   alcohol abuse (sober from alcohol since June 25, 2023)      Infectious Disease:       Malignancy:       Other:               OUTSIDE LABS:  CBC:   Lab Results   Component Value Date    WBC 7.5 03/04/2024    WBC 10.2 03/02/2024    HGB 7.4 (L) 03/04/2024    HGB 7.2 (L) 03/02/2024    HCT 22.6 (L) 03/04/2024    HCT 22.2 (L) 03/02/2024    PLT 56 (L) 03/04/2024    PLT 54 (L) 03/02/2024     BMP:   Lab Results   Component Value Date     (L) 03/04/2024     (L) 03/02/2024    POTASSIUM 4.3 03/04/2024    POTASSIUM 4.4 03/02/2024    CHLORIDE 96 (L) 03/04/2024    CHLORIDE 100 03/02/2024    CO2 25 03/04/2024    CO2 22 03/02/2024    BUN 17.5 03/04/2024    BUN 19.3 03/02/2024    CR 1.08 03/04/2024    CR 0.89 03/02/2024     (H) 03/04/2024     (H) 03/04/2024     COAGS:   Lab Results   Component Value Date    PTT 42 (H) 03/04/2024    INR 2.76 (H) 03/04/2024    FIBR 87 (LL) 03/04/2024     POC: No results found for: \"BGM\", \"HCG\", \"HCGS\"  HEPATIC:   Lab Results   Component Value Date    ALBUMIN 2.6 (L) 03/04/2024    PROTTOTAL 6.2 (L) 03/04/2024    ALT 50 03/04/2024    AST  03/04/2024      Comment:      Unsatisfactory specimen - hemolyzed  Reference intervals for this test were updated on 6/12/2023 to more accurately reflect our healthy population. There may be differences in the flagging of prior results with similar values performed with this method. Interpretation of those prior results can be made in the context of the updated reference intervals.    ALKPHOS 181 (H) 03/04/2024    BILITOTAL 12.7 (H) 03/04/2024    JAQUELINE 46 02/26/2024     OTHER:   Lab Results   Component Value Date    LACT 2.6 (H) 02/24/2024    A1C 4.8 02/25/2024    MEG 8.7 03/04/2024    PHOS 2.3 (L) 03/04/2024    MAG 1.1 (L) 03/04/2024    LIPASE 131 11/29/2005    AMYLASE 10 (L) 03/04/2024    TSH " "2.18 03/12/2022       Anesthesia Plan    ASA Status:  4       Anesthesia Type: General.     - Airway: ETT   Induction: Intravenous, Propofol, RSI.   Maintenance: Balanced.   Techniques and Equipment:     - Lines/Monitors: 2nd IV, Arterial Line, Central Line, BIS, CVP, ANEL            ANEL Absolute Contra-indication: NONE            ANEL Relative Contra-indication: Thrombocytopenia, Esophageal Varices     - Blood: Blood in Room, FFP, PLT     - Drips/Meds: Vasopressin, Epinephrine, Norepi     Consents            Postoperative Care    Pain management: IV analgesics, Oral pain medications, Multi-modal analgesia.   PONV prophylaxis: Ondansetron (or other 5HT-3), Dexamethasone or Solumedrol     Comments:               Bernardo Piña MD    I have reviewed the pertinent notes and labs in the chart from the past 30 days and (re)examined the patient.  Any updates or changes from those notes are reflected in this note.    # Hyperkalemia: Highest K = 5.7 mmol/L in last 30 days, will monitor as appropriate  # Hyponatremia: Lowest Na = 129 mmol/L in last 2 days, will monitor as appropriate    # Hypomagnesemia: Lowest Mg = 1.1 mg/dL in last 2 days, will replace as needed   # Hypoalbuminemia: Lowest albumin = 2.6 g/dL at 3/4/2024 10:58 AM, will monitor as appropriate   # Coagulation Defect: INR = 2.76 (Ref range: 0.85 - 1.15) and/or PTT = 42 Seconds (Ref range: 22 - 38 Seconds), will monitor for bleeding  # Thrombocytopenia: Lowest platelets = 56 in last 2 days, will monitor for bleeding  # Obesity: Estimated body mass index is 35.81 kg/m  as calculated from the following:    Height as of this encounter: 1.73 m (5' 8.11\").    Weight as of this encounter: 107.2 kg (236 lb 4.8 oz).      "

## 2024-03-04 NOTE — CONSULTS
Transplant Admission Psychosocial Assessment    Patient Name: Mary Lopez  : 1970  Age: 53 year old  MRN: [id number]  Date of Initial Social Work Evaluation: 2023    Mary is anticipated to have a  donor liver transplant tomorrow morning.  I met with Mary and his wife Adina to update psychosocial assessment and provide education about SW role while inpatient, and to begin discussion of expectations/requirements, caregiver needs and follow up needs post-transplant.     Presenting Information   Living Situation: Mary and his wife Adina live together in a [facility] in [place], [place].   If not local, plans for short term stay:  n/a  Previous Functional Status: Mary's wife assists with medication management.  Mary is independent with his personal cares and ambulation.  He does not use an assistive device.  Adina has been providing transportation.  Cultural/Language/Spiritual Considerations: English is primary language.    Support System  Primary Support Person: wife Adina  Other support:  Additional care giving could come from Mary's father Cristofer ([place], [place]) and Adina's mother ([place], [place]).  Plan for support in immediate post-transplant period:  Mary's wife Adina will be his primary care giver pre and post transplant.  She has FMLA completed.  Adina has the ability to work remotely.    Health Care Directive  Decision Maker: patient  Alternate Decision Maker: Rosio  Lopez is named as patient's health care agent.   Health Care Directive: Copy in Chart    Mental Health/Coping:   History of Mental Health: Mary has no mental health history.  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  Mary completed a substance use assessment at [facility] on 23, and he was recommended to begin individual therapy.  Mary established care with a therapy provider at [facility] (Cassie Dangelo [facility]) on 2023 and  he has had weekly sessions (as health allowed).  Current status: stable  Coping: Adina reports Mary has been more down/depressed lately with the uncertainty of transplant, especially after being called in a couple of weeks ago and having surgery cancelled due to a positive COVID test.  Services Needed/Recommended: Liver Transplant Support Group, offered inpatient health psychology    Financial   Income: Mary is receiving long-term disability benefits through [employer] and [employer] benefits.  Adina has income from her full-time employment.   Impact of transplant on income: reduced income on disability benefits  Insurance and medication coverage: [employer] health insurance, through Adina's employer.  Pharmacy test claims not yet available.  Financial concerns: none voiced  Resources needed: ongoing assessment    Education provided by SW: Social Work role inpatient setting, availability of support group, parking information, TCU/ARU, post transplant routines    Assessment and recommendations and plan:   Mary slept throughout my entire visit.  His wife Adina was very engaged in psychosocial education, and will be very involved throughout Mary's recovery.  She has my contact, and I encouraged her to reach out with questions.        CAPRI Brock, [facility]  Liver Transplant   Phone [phone]  Available on [employer]

## 2024-03-04 NOTE — LETTER
[facility] [address] [address]  [phone]    FACSIMILE TRANSMITTAL SHEET    TO:Seema Rodas   [phone]   Fax [phone]        _____URGENT _____REVIEW ONLY _____PLEASE COMMENT____PLEASE REPLY    NOTES/COMMENTS:Attached please find final discharge orders for Mary Valadezmarcin Perry, RN BSN, PHN, ACM-RN  2/12/2024  Nurse Coordinator    Phone: [phone]    Social Work and Care Management Department         3/21/2024                                                  IF YOU DID NOT RECEIVE THE CORRECT NUMBER OF PAGES OR THE FAX DID NOT COME THROUGH CLEARLY, PLEASE CALL THE SENDER     CONFIDENTIALITY STATEMENT: Confidential information that may accompany this transmission contains protected health information under state and federal law and is legally privileged. This information is intended only for the use of the individual or entity named above and may be used only for carrying out treatment, payment or other healthcare operations. The recipient or person responsible for delivering this information is prohibited by law from disclosing this information without proper authorization to any other party, unless required to do so by law or regulation. If you are not the intended recipient, you are hereby notified that any review, dissemination, distribution, or copying of this message is strictly prohibited. If you have received this communication in error, please destroy the materials and contact us immediately by calling the number listed above. No response indicates that the information was received by the appropriate authorized party

## 2024-03-04 NOTE — PHARMACY-ADMISSION MEDICATION HISTORY
Pharmacy Intern Admission Medication History    Admission medication history is complete. The information provided in this note is only as accurate as the sources available at the time of the update.    Information Source(s): Caregiver via in-person    Pertinent Information:     The patient took all morning doses this morning at around 6:30 am.     [facility] dispense report reviewed for relevant information.     Verified with caregiver that cyclobenzaprine, doxepin, ferrous sulfate, and metformin have been discontinued.     Changes made to PTA medication list:  Added:   Hydrocodone-acetaminophen 5-325 mg: Patient took 1 tablet last night for pain.   Deleted: None  Changed:   Potassium chloride 10 mEq dose to 1 tablet by mouth twice daily per patient caretaker.   Clarifying note on torsemide - Patient is taking 2 tablets by mouth in the morning and 1 tablet by mouth in the afternoon for a total daily dose of 3 tables.     Allergies reviewed with patient and updates made in EHR: yes    Medication History Completed By: Kendell Morton 3/4/2024 11:03 AM    PTA Med List   Medication Sig Note Last Dose    amoxicillin (AMOXIL) 500 MG capsule Take 2 capsules (1,000 mg) by mouth every 8 hours for 7 days  3/4/2024    folic acid (FOLVITE) 1 MG tablet Take 1 tablet (1 mg) by mouth daily  3/4/2024    HYDROcodone-acetaminophen (NORCO) 5-325 MG tablet Take 1 tablet by mouth every 6 hours as needed for severe pain Patient took 1 tablet last night for pain.  3/3/2024    insulin degludec (TRESIBA FLEXTOUCH) 100 UNIT/ML pen Inject 36 Units Subcutaneous daily Inject 36 units daily.  3/3/2024 at 2:30 pm    insulin lispro (HUMALOG KWIKPEN) 100 UNIT/ML (1 unit dial) KWIKPEN Inject 1-10 Units Subcutaneous 3 times daily (before meals) for 50 days Correction scale: Give 1 unit insulin for every order of 35 that blood glucose level is >140 three times daily before meals and for every order of 35 that blood glucose is >200 at bedtime  3/4/2024: Patient received 6 units this morning.  3/4/2024    Insulin Lispro (HUMALOG KWIKPEN) 200 UNIT/ML soln Inject 8 Units Subcutaneous 4 times daily (with meals and nightly) Give 8 units before each meal. Give additional units for correction with sliding scale (1 unit for each order of 35 blood glucose >140 before meals).  3/4/2024    lactulose (CHRONULAC) 10 GM/15ML solution TAKE 30 MLS BY MOUTH 2 TIMES DAILY (Patient taking differently: TAKE 30 MLS BY MOUTH 3 TIMES DAILY)  3/4/2024    levofloxacin (LEVAQUIN) 500 MG tablet Take 1 tablet (500 mg) by mouth daily for 6 days  3/4/2024    melatonin 10 MG TABS tablet Take 1 tablet (10 mg) by mouth nightly as needed for sleep  3/3/2024    midodrine (PROAMATINE) 10 MG tablet Take 1 tablet (10 mg) by mouth every 8 hours for 30 days  3/4/2024    multivitamin w/minerals (THERA-VIT-M) tablet TAKE 1 TABLET BY MOUTH DAILY  3/4/2024    omeprazole (PRILOSEC) 20 MG DR capsule Take 1 capsule (20 mg) by mouth 2 times daily  3/4/2024    potassium chloride ER (KLOR-CON M) 10 MEQ CR tablet Take 2 tablets (20 mEq) by mouth 2 times daily 3/4/2024: Patient has been taking 1 tablet by mouth twice daily.  3/4/2024    rifaximin (XIFAXAN) 550 MG TABS tablet Take 1 tablet (550 mg) by mouth 2 times daily for 180 days  3/4/2024    spironolactone (ALDACTONE) 25 MG tablet Take 1 tablet (25 mg) by mouth daily  3/4/2024    thiamine (B-1) 100 MG tablet Take 1 tablet (100 mg) by mouth daily for 30 days  3/4/2024    torsemide (DEMADEX) 10 MG tablet Take 3 tablets (30 mg) by mouth daily as needed Take as needed for fluid once daily if systolic blood pressure (top number) is 100 or greater. 3/4/2024: Patient takes 2 tablets by mouth in the morning, and 1 tablet in the afternoon.  3/4/2024    traZODone (DESYREL) 50 MG tablet Take 0.5 tablets (25 mg) by mouth nightly as needed for sleep  3/3/2024      Kendell Georges D4 student

## 2024-03-04 NOTE — PLAN OF CARE
"/50 (BP Location: Left arm)   Pulse 65   Temp 97.7  F (36.5  C) (Oral)   Resp 18   Ht 1.73 m (5' 8.11\")   Wt 107.2 kg (236 lb 4.8 oz)   SpO2 95%   BMI 35.81 kg/m    Shift: arrived to 7A @1000, through 1530  Isolation Status: standard  VS: stable on room air, afebrile  Neuro: Aox4  Behaviors: calm, able to make needs known  B, insulin gtt to be started  Labs: mag 1.1  Respiratory: WDL  Cardiac: WDL  Pain/Nausea: denies  PRN: none given  Diet: regular, NPO @1800  IV Access: L PIV, R PIV SL  Infusion(s): vanco, zosyn, micafungin  Lines/Drains: none  GI/: voiding without difficulty, no BM on shift  Skin: jaundiced  Mobility: UAL  Events/Education: CXR, EKG, COVID swab, Urine sample complete  Plan: continue POC and notify team of changes. Plan for transplant tomorrow am    "

## 2024-03-04 NOTE — PROVIDER NOTIFICATION
"Paged Franchesca THOMPSON    \"[address] Lopez  blood sugar 411, intervene?    Rosio GILL [phone]\"    Response: insulin gtt to be started  "

## 2024-03-04 NOTE — TELEPHONE ENCOUNTER
"TRANSPLANT OR REPORT    Organ: Liver  Laterality (if known): N/A  Organ Location:  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  ~ CRRT Resource Nurse: N/A  (Telephone Number for CRRT [phone]. When prompted, the caller should say  CRRT Resource 1\")    Kidney/Panc Transplants  XM Status (Need to wait for XM?): N/A    Liver or KP/PA Recipients - Vessel Banking:  Donor has positive serologies for HIV/HCV/HBV: No  Donor has risk criteria for HIV/HCV/HBV: No      [place] [facility] [phone]      Vessel Bank Information  Transplant hospitals must not store a donor s extra vessels if the donor has tested positive for any of the following:   - HIV by antibody, antigen, or nucleic acid test (OCTAVIA)   - Hepatitis B surface antigen (HBsAg)   - Hepatitis B (HBV) by OCTAVIA   - Hepatitis C (HCV) by antibody or OCTAVIA     Extra vessels from donors that do not test positive for HIV, HBV, or HCV as above may be stored  "

## 2024-03-04 NOTE — PROGRESS NOTES
CLINICAL NUTRITION SERVICES - ASSESSMENT NOTE     Nutrition Prescription    RECOMMENDATIONS FOR MDs/PROVIDERS TO ORDER:  Strongly recommend FT placement for nutritional supplementation/repletion as part of recovery post-op liver transplant.     Malnutrition Status:    Severe malnutrition in the context of acute illness    Recommendations already ordered by Registered Dietitian (RD):  Strawberry Glucerna TID with meals today, before surgery    Future/Additional Recommendations:  Tube Feeding Recommendations: TwoCal HN @ 40 mL/hr (960 mL/day) + 2 pkts Prosource TF20 to provide 2080 kcals (26 kcal/kg/day), 121 g PRO (1.5 g/kg/day), 672 mL H2O, 210 g CHO and 5 g Fiber daily.  -monitor lytes closely with TF start/advancement, will be at high risk for refeeding syndrome    Upon diet advancement, start strawberry Glucerna TID with meals and calorie counts.      REASON FOR ASSESSMENT  Mary Lopez is a/an 53 year old male assessed by the dietitian for Provider Order - Pre-Op Liver Transplant    NUTRITION HISTORY  Patient reports reduced appetite and early satiety, particularly in the last week but in general over the last month as well.  In the last week his oral intake has been <25% of usual intake.  For a while has only been eating ~2 small meals/day.  If he does snack, it is usually fruit.  He has been taking a generic oral protein drink at home. He prefers strawberry flavored supplements.     Home meds: folic acid 1 mg, Tresiba, correction scale humalog, 8 units set dose humalog with meals QID, MVI with minerals, thiamine, omeprazole BID    Allergies: legumes (swelling), pineapple (itching). Also itches with fresh fish, however tolerates caesar dressing and wants to be allowed to order this.  Has some lactose intolerance but would like to self restrict on this as well.     CURRENT NUTRITION ORDERS  Diet: Regular    LABS  Na 129  Mg++ 1.1 (low)  Phos 2.3 (low)  -411 (high)  Hgb A1C 4.8  "(normal)    MEDICATIONS  Medications reviewed    ANTHROPOMETRICS  Height: 173 cm (5' 8.11\")  Most Recent Weight: 107.2 kg (236 lb 4.8 oz)    IBW: 70 kg  BMI: Obesity Grade II BMI 35-39.9  Weight History: Weight up for weights last month (~210#).  Pt significantly fluid up  Wt Readings from Last 15 Encounters:   03/04/24 107.2 kg (236 lb 4.8 oz)   03/02/24 113.5 kg (250 lb 3.6 oz)   02/14/24 95.3 kg (210 lb)   02/06/24 98.4 kg (217 lb)   01/30/24 98.8 kg (217 lb 12.8 oz)   01/26/24 101.2 kg (223 lb)   12/29/23 93.4 kg (206 lb)   12/26/23 93.6 kg (206 lb 4.8 oz)   12/22/23 100.7 kg (222 lb)   12/19/23 104.7 kg (230 lb 12.8 oz)   12/01/23 95.3 kg (210 lb)   11/28/23 95.3 kg (210 lb)   11/22/23 93.4 kg (206 lb)   10/16/23 99.8 kg (220 lb)   09/14/23 94 kg (207 lb 3.2 oz)   Dosing Weight: 79 kg (adj wt based on IBW of 70 kg and actual wt of 107.2 kg)    ASSESSED NUTRITION NEEDS  Estimated Energy Needs: 9323-6135 kcals/day (25-30 kcals/kg)  Justification: Increased needs s/p potential liver tx  Estimated Protein Needs: 119-158 grams protein/day (1.5 - 2 grams of pro/kg)  Justification: Increased needs s/p potential liver tx  Estimated Fluid Needs: per MD pending fluid status    MALNUTRITION  % Intake: </= 50% for >/= 5 days (severe)  % Weight Loss: Unable to assess - weight significantly elevated d/t fluid  Subcutaneous Fat Loss: Facial region: mild  Muscle Loss: Temporal: moderate, Facial & jaw region: moderate, and Upper arm (bicep, tricep): moderate  Fluid Accumulation/Edema: Moderate  Hand : 21 kg (avg) R hand, measurably reduced  Fairly consistent with measurement from 12/19/23, 18 kg (avg)  Malnutrition Diagnosis: Severe malnutrition in the context of acute illness    NUTRITION DIAGNOSIS  Inadequate oral intake related to reduced appetite, early satiety as evidenced by patient with reduced strength, muscle loss and eating <50% over the last at least 5 days.     INTERVENTIONS  Implementation  Nutrition Education: " Provided education on RD role, brief overview of diet post-transplant, nutrition plan of care following liver transplant including: potential for FT, need for increased protein intake (oral supplements) and calorie counts.     Medical food supplement therapy     Goals  Diet adv v nutrition support within 2-3 days.     Monitoring/Evaluation  Progress toward goals will be monitored and evaluated per protocol.  Fabi Bryan, MS, RD, LD, CCTD, CNSC  7A and 7B beds [phone], pager [phone]  Weekend pager [phone]

## 2024-03-04 NOTE — H&P
Physician Attestation   I saw this patient with the resident and agree with the resident/fellow's findings and plan of care as documented in the note.      Key findings: as noted below    MANAGEMENT DISCUSSED with the following over the past 24 hours: RISKS OF LIVER TRANSPLANT EXPLAINED     I have personally reviewed the following data over the past 24 hrs:    15.1 (H)  \   9.0 (L)   / 106 (L)     139 107 29.4 (H) /  166 (H)   3.6 25 1.06 \     ALT: 103 (H) AST: 43 AP: 79 TBILI: 1.7 (H)   ALB: 3.1 (L) TOT PROTEIN: 5.0 (L) LIPASE: N/A     INR:  1.25 (H) PTT:  N/A   D-dimer:  N/A Fibrinogen:  175         Ryder Cortez MD  Date of Service (when I saw the patient): 3/4/2024  [facility]    History and Physical  Transplant Surgery     Date of Admission:  3/4/2024    Assessment & Plan   Mary Lopez is a 53 year old male with a history of EtOH cirrhosis, complicated by encephalopathy, hyponatremia, chronic thrombocytopenia, macrocytic anemia, with recent hospitalization (2024) for encephalopathy and suspected SBP (no safe window to complete para). Also with a history of type 2 diabetes mellitus. Deferred from liver transplant 2/15 due to positive covid test. Recently admitted -3/2 for Strep bovis bacteremia, he was temporarily inactivated on the liver transplant wait list, but was reactivated as of 24. He was ultimately discharged on oral amoxicillin and levaquin with planned stop date of 3/8/24. He now presents as a candidate for possible  donor liver transplant.Patient was notified as an acceptable  donor organ became available and presented for further pre-operative work-up.  Patient was informed of the risks and benefits regarding  donor organ transplantation, and has elected to proceed with possible  donor liver transplant.      MELD 3.0: 31 at 3/2/2024  4:35 AM  MELD-Na: 32 at 3/2/2024  4:35 AM  Calculated  "from:  Serum Creatinine: 0.89 mg/dL (Using min of 1 mg/dL) at 3/2/2024  4:35 AM  Serum Sodium: 128 mmol/L at 3/2/2024  4:35 AM  Total Bilirubin: 9.8 mg/dL at 3/2/2024  4:35 AM  Serum Albumin: 2.5 g/dL at 3/2/2024  4:35 AM  INR(ratio): 3.18 at 3/2/2024  4:35 AM  Age at listin years  Sex: Male at 3/2/2024  4:35 AM      Plan:   -Pre-op labs: liver tests deranged as expected, magnesium low at 1.1, will replete.   -CXR \"Continued trace left pleural effusion and left basilar  consolidative opacities, better evaluated on CT 2024. \"  -EKG  -Can eat now, NPO starting this evening for possible OR time around 0600 tomorrow  -STAT COVID-19 PCR  -Surgeon to obtain formal consent  -Planning for OR time around 0600 tomorrow    JAY Antunez-C    Code Status   Full Code    Primary Care Physician   Jimmie Hoyt    Chief Complaint    donor liver transplant candidate    History is obtained from the patient, electronic health record, and patient's significant other    History of Present Illness   Mary Lopez is a 53 year old male with a history of EtOH cirrhosis, complicated by encephalopathy, hyponatremia, chronic thrombocytopenia, macrocytic anemia, with recent hospitalization (2024) for encephalopathy and suspected SBP (no safe window to complete para). Also with a history of type 2 diabetes mellitus. Deferred from liver transplant 2/15 due to positive covid test. Recently admitted -3/2 for Strep bovis bacteremia, he was temporarily inactivated on the liver transplant wait list, but was reactivated as of 24. He was ultimately discharged on oral amoxicillin and levaquin with planned stop date of 3/8/24. He has been extra lethargic since discharge from the hospital. He now presents as a candidate for possible  donor liver transplant.    Past Medical History    I have reviewed this patient's medical history and updated it with pertinent information if needed.   Past Medical " History:   Diagnosis Date    ANGEL (acute kidney injury) (H24)     Alcoholic cirrhosis of liver without ascites (H) 07/13/2023    Alcoholic hepatitis with ascites (H28) 10/03/2023    Alcoholic hepatitis without ascites (H28) 07/13/2023    Closed fracture of one rib of left side 09/14/2023    Concussion without loss of consciousness 03/11/2020    Decompensated hepatic cirrhosis (H) 09/15/2023    Diabetes mellitus, type 2 (H) 11/22/2023    Essential hypertension 03/11/2020    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)     Mild hyperlipidemia 12/07/2021    Persistent insomnia 07/13/2023    Portal hypertension (H) 07/13/2023    Scrotal abscess     Secondary esophageal 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 (H) 07/13/2023    Tobacco abuse disorder 03/11/2020    Type 2 diabetes mellitus with hyperglycemia (H) 12/22/2023       Past Surgical History   I have reviewed this patient's surgical history and updated it with pertinent information if needed.  Past Surgical History:   Procedure Laterality Date    CHOLECYSTECTOMY      COLONOSCOPY N/A 1/2/2024    Procedure: COLONOSCOPY, WITH POLYPECTOMY;  Surgeon: Jak Urbina MD;  Location: [facility]    CV RIGHT HEART CATH MEASUREMENTS RECORDED N/A 1/30/2024    Procedure: Right Heart Catheterization;  Surgeon: Alfred Tafoya MD;  Location:  HEART CARDIAC CATH LAB    TONSILLECTOMY      VASECTOMY         Prior to Admission Medications   Prior to Admission Medications   Prescriptions Last Dose Informant Patient Reported? Taking?   Continuous Blood Gluc Sensor (DEXCOM G7 SENSOR) MISC   No No   Sig: Change every 10 days.   HYDROcodone-acetaminophen (NORCO) 5-325 MG tablet 3/3/2024  Yes Yes   Sig: Take 1 tablet by mouth every 6 hours as needed for severe pain Patient took 1 tablet last night for pain.   Insulin Lispro (HUMALOG KWIKPEN) 200 UNIT/ML soln 3/4/2024  No Yes   Sig: Inject 8 Units Subcutaneous 4 times daily (with meals and nightly) Give 8 units before each meal. Give  additional units for correction with sliding scale (1 unit for each order of 35 blood glucose >140 before meals).   amoxicillin (AMOXIL) 500 MG capsule 3/4/2024  No Yes   Sig: Take 2 capsules (1,000 mg) by mouth every 8 hours for 7 days   blood glucose (NO BRAND SPECIFIED) test strip   No No   Sig: Use to test blood sugar two times daily or as directed. To accompany: Blood Glucose Monitor Brands: per insurance.   blood glucose monitoring (NO BRAND SPECIFIED) meter device kit   No No   Sig: Use to test blood sugar twice times daily or as directed. Preferred blood glucose meter OR supplies to accompany: Blood Glucose Monitor Brands: per insurance.   folic acid (FOLVITE) 1 MG tablet 3/4/2024  No Yes   Sig: Take 1 tablet (1 mg) by mouth daily   insulin degludec (TRESIBA FLEXTOUCH) 100 UNIT/ML pen 3/3/2024 at 2:30 pm  No Yes   Sig: Inject 36 Units Subcutaneous daily Inject 36 units daily.   insulin lispro (HUMALOG KWIKPEN) 100 UNIT/ML (1 unit dial) KWIKPEN 3/4/2024  No Yes   Sig: Inject 1-10 Units Subcutaneous 3 times daily (before meals) for 50 days Correction scale: Give 1 unit insulin for every order of 35 that blood glucose level is >140 three times daily before meals and for every order of 35 that blood glucose is >200 at bedtime   insulin pen needle (BD PEN NEEDLE SHERRIE 2ND GEN) 32G X 4 MM miscellaneous   No No   Sig: USES 5 PER DAY   lactulose (CHRONULAC) 10 GM/15ML solution 3/4/2024  No Yes   Sig: TAKE 30 MLS BY MOUTH 2 TIMES DAILY   Patient taking differently: TAKE 30 MLS BY MOUTH 3 TIMES DAILY   levofloxacin (LEVAQUIN) 500 MG tablet 3/4/2024  No Yes   Sig: Take 1 tablet (500 mg) by mouth daily for 6 days   melatonin 10 MG TABS tablet 3/3/2024  No Yes   Sig: Take 1 tablet (10 mg) by mouth nightly as needed for sleep   midodrine (PROAMATINE) 10 MG tablet 3/4/2024  No Yes   Sig: Take 1 tablet (10 mg) by mouth every 8 hours for 30 days   multivitamin w/minerals (THERA-VIT-M) tablet 3/4/2024  No Yes   Sig: TAKE 1  TABLET BY MOUTH DAILY   omeprazole (PRILOSEC) 20 MG DR capsule 3/4/2024  Yes Yes   Sig: Take 1 capsule (20 mg) by mouth 2 times daily   potassium chloride ER (KLOR-CON M) 10 MEQ CR tablet 3/4/2024  No Yes   Sig: Take 2 tablets (20 mEq) by mouth 2 times daily   rifaximin (XIFAXAN) 550 MG TABS tablet 3/4/2024  No Yes   Sig: Take 1 tablet (550 mg) by mouth 2 times daily for 180 days   spironolactone (ALDACTONE) 25 MG tablet 3/4/2024  No Yes   Sig: Take 1 tablet (25 mg) by mouth daily   thiamine (B-1) 100 MG tablet 3/4/2024  No Yes   Sig: Take 1 tablet (100 mg) by mouth daily for 30 days   thin (NO BRAND SPECIFIED) lancets   No No   Sig: Use with lanceting device. To accompany: Blood Glucose Monitor Brands: per insurance.   torsemide (DEMADEX) 10 MG tablet 3/4/2024  No Yes   Sig: Take 3 tablets (30 mg) by mouth daily as needed Take as needed for fluid once daily if systolic blood pressure (top number) is 100 or greater.   traZODone (DESYREL) 50 MG tablet 3/3/2024  No Yes   Sig: Take 0.5 tablets (25 mg) by mouth nightly as needed for sleep      Facility-Administered Medications: None     Allergies   Allergies   Allergen Reactions    Food Anaphylaxis     baked beans    Pineapple Itching       Social History   I have reviewed this patient's social history and updated it with pertinent information if needed. Mary Lopez  reports that he has quit smoking. His smoking use included cigarettes. His smokeless tobacco use includes chew. He reports that he does not currently use alcohol after a past usage of about 12.0 standard drinks of alcohol per week. He has been sober since 6/25/23. He reports that he does not currently use drugs.    Family History   I have reviewed this patient's family history and updated it with pertinent information if needed.   Family History   Problem Relation Age of Onset    Anxiety Disorder Mother     Depression Mother     Bipolar Disorder Mother     Chronic Obstructive Pulmonary Disease  Mother     Lung Cancer Mother 81    Morbid Obesity Father     Diabetes Father     Diabetes Type 2  Brother     Substance Abuse Maternal Grandfather     Substance Abuse Paternal Grandfather     Colon Cancer No family hx of     Liver Disease No family hx of        Review of Systems   The 10 point Review of Systems is negative other than noted in the HPI or here.     Physical Exam   Temp: 97.7  F (36.5  C) Temp src: Oral BP: 100/50 Pulse: 65   Resp: 18 SpO2: 95 % O2 Device: None (Room air)    Vital Signs with Ranges  Temp:  [97.7  F (36.5  C)] 97.7  F (36.5  C)  Pulse:  [65] 65  Resp:  [18] 18  BP: (100)/(50) 100/50  SpO2:  [95 %] 95 %  236 lbs 4.8 oz    Constitutional: awake, alert, NAD  HEENT: EOMI, MMM  Respiratory: nonlabored breathing on room air, CTABL  Cardiovascular: RRR, no murmur  GI: abdomen soft, nondistended, nontender  Skin: No rash  Musculoskeletal: moving all extremities, 3-4+ bilateral lower extremity edema  Neurologic: AOx3, no focal deficits  Neuropsychiatric: appropriate mood and affect    Data   Pending

## 2024-03-04 NOTE — PLAN OF CARE
Goal Outcome Evaluation:      Plan of Care Reviewed With: spouse             Patient and his wife Adina will receive post transplant psychosocial education.        CAPRI Brock, [facility]  Liver Transplant   Phone [phone]

## 2024-03-05 ENCOUNTER — APPOINTMENT (OUTPATIENT)
Dept: GENERAL RADIOLOGY | Facility: CLINIC | Age: 54
DRG: 005 | End: 2024-03-05
Attending: TRANSPLANT SURGERY
Payer: COMMERCIAL

## 2024-03-05 ENCOUNTER — ANESTHESIA (OUTPATIENT)
Dept: INTENSIVE CARE | Facility: CLINIC | Age: 54
DRG: 005 | End: 2024-03-05
Payer: COMMERCIAL

## 2024-03-05 ENCOUNTER — APPOINTMENT (OUTPATIENT)
Dept: ULTRASOUND IMAGING | Facility: CLINIC | Age: 54
DRG: 005 | End: 2024-03-05
Attending: STUDENT IN AN ORGANIZED HEALTH CARE EDUCATION/TRAINING PROGRAM
Payer: COMMERCIAL

## 2024-03-05 ENCOUNTER — DOCUMENTATION ONLY (OUTPATIENT)
Dept: TRANSPLANT | Facility: CLINIC | Age: 54
End: 2024-03-05

## 2024-03-05 ENCOUNTER — ANESTHESIA (OUTPATIENT)
Dept: SURGERY | Facility: CLINIC | Age: 54
DRG: 005 | End: 2024-03-05
Payer: COMMERCIAL

## 2024-03-05 ENCOUNTER — APPOINTMENT (OUTPATIENT)
Dept: GENERAL RADIOLOGY | Facility: CLINIC | Age: 54
DRG: 005 | End: 2024-03-05
Attending: STUDENT IN AN ORGANIZED HEALTH CARE EDUCATION/TRAINING PROGRAM
Payer: COMMERCIAL

## 2024-03-05 PROBLEM — K74.60 CIRRHOSIS (H): Status: ACTIVE | Noted: 2024-03-05

## 2024-03-05 LAB
ALBUMIN SERPL BCG-MCNC: 2 G/DL (ref 3.5–5.2)
ALLEN'S TEST: ABNORMAL
ALP SERPL-CCNC: 71 U/L (ref 40–150)
ALT SERPL W P-5'-P-CCNC: 596 U/L (ref 0–70)
ANION GAP SERPL CALCULATED.3IONS-SCNC: 6 MMOL/L (ref 7–15)
APTT PPP: 87 SECONDS (ref 22–38)
APTT PPP: 89 SECONDS (ref 22–38)
AST SERPL W P-5'-P-CCNC: 1038 U/L (ref 0–45)
ATRIAL RATE - MUSE: 85 BPM
BACTERIA UR CULT: NO GROWTH
BASE EXCESS BLDA CALC-SCNC: -0.3 MMOL/L (ref -3–3)
BASE EXCESS BLDA CALC-SCNC: -0.7 MMOL/L (ref -3–3)
BASE EXCESS BLDA CALC-SCNC: -0.9 MMOL/L (ref -3–3)
BASE EXCESS BLDA CALC-SCNC: -1.8 MMOL/L (ref -3–3)
BASE EXCESS BLDA CALC-SCNC: -2.4 MMOL/L (ref -3–3)
BASE EXCESS BLDA CALC-SCNC: -2.6 MMOL/L (ref -3–3)
BASE EXCESS BLDA CALC-SCNC: -2.6 MMOL/L (ref -3–3)
BASE EXCESS BLDA CALC-SCNC: 0.3 MMOL/L (ref -3–3)
BASE EXCESS BLDA CALC-SCNC: 1.2 MMOL/L (ref -3–3)
BASE EXCESS BLDA CALC-SCNC: 2.2 MMOL/L (ref -3–3)
BASE EXCESS BLDA CALC-SCNC: 3 MMOL/L (ref -3–3)
BASOPHILS # BLD AUTO: 0 10E3/UL (ref 0–0.2)
BASOPHILS NFR BLD AUTO: 0 %
BILIRUB DIRECT SERPL-MCNC: 7.47 MG/DL (ref 0–0.3)
BILIRUB SERPL-MCNC: 8.7 MG/DL
BLD PROD TYP BPU: NORMAL
BLOOD COMPONENT TYPE: NORMAL
BUN SERPL-MCNC: 17.3 MG/DL (ref 6–20)
CA-I BLD-MCNC: 3.6 MG/DL (ref 4.4–5.2)
CA-I BLD-MCNC: 4.2 MG/DL (ref 4.4–5.2)
CA-I BLD-MCNC: 4.4 MG/DL (ref 4.4–5.2)
CA-I BLD-MCNC: 4.4 MG/DL (ref 4.4–5.2)
CA-I BLD-MCNC: 4.9 MG/DL (ref 4.4–5.2)
CA-I BLD-MCNC: 5.5 MG/DL (ref 4.4–5.2)
CA-I BLD-MCNC: 5.5 MG/DL (ref 4.4–5.2)
CA-I BLD-MCNC: 5.6 MG/DL (ref 4.4–5.2)
CALCIUM SERPL-MCNC: 9.2 MG/DL (ref 8.6–10)
CHLORIDE SERPL-SCNC: 103 MMOL/L (ref 98–107)
CLOT INIT KAOL IND TO POST HEP NEUT TRTO: 0.9 {RATIO}
CLOT INIT KAOL IND TO POST HEP NEUT TRTO: 0.9 {RATIO}
CLOT INIT KAOL IND TO POST HEP NEUT TRTO: 1 {RATIO}
CLOT INIT KAOL IND TO POST HEP NEUT TRTO: 1.1 {RATIO}
CLOT INIT KAOLIN IND BLD US: 135 SEC (ref 113–166)
CLOT INIT KAOLIN IND BLD US: 138 SEC (ref 113–166)
CLOT INIT KAOLIN IND BLD US: 143 SEC (ref 113–166)
CLOT INIT KAOLIN IND BLD US: 148 SEC (ref 113–166)
CLOT INIT KAOLIN IND BLD US: 151 SEC (ref 113–166)
CLOT INIT KAOLIN IND BLD US: 155 SEC (ref 113–166)
CLOT INIT KAOLIN IND BLD US: 159 SEC (ref 113–166)
CLOT INIT KAOLIN IND BLD US: 171 SEC (ref 113–166)
CLOT INIT KAOLIN IND BLD US: 175 SEC (ref 113–166)
CLOT INIT KAOLIN IND P HEP NEUT BLD US: 135 SEC (ref 103–153)
CLOT INIT KAOLIN IND P HEP NEUT BLD US: 136 SEC (ref 103–153)
CLOT INIT KAOLIN IND P HEP NEUT BLD US: 136 SEC (ref 103–153)
CLOT INIT KAOLIN IND P HEP NEUT BLD US: 144 SEC (ref 103–153)
CLOT INIT KAOLIN IND P HEP NEUT BLD US: 152 SEC (ref 103–153)
CLOT INIT KAOLIN IND P HEP NEUT BLD US: 164 SEC (ref 103–153)
CLOT INIT KAOLIN IND P HEP NEUT BLD US: 165 SEC (ref 103–153)
CLOT INIT KAOLIN IND P HEP NEUT BLD US: 172 SEC (ref 103–153)
CLOT INIT KAOLIN IND P HEP NEUT BLD US: 180 SEC (ref 103–153)
CLOT STIFF PLT CONT BLD CALC: 11 HPA (ref 11.9–29.8)
CLOT STIFF PLT CONT BLD CALC: 12.5 HPA (ref 11.9–29.8)
CLOT STIFF PLT CONT BLD CALC: 13.2 HPA (ref 11.9–29.8)
CLOT STIFF PLT CONT BLD CALC: 13.6 HPA (ref 11.9–29.8)
CLOT STIFF PLT CONT BLD CALC: 16.4 HPA (ref 11.9–29.8)
CLOT STIFF PLT CONT BLD CALC: 4.8 HPA (ref 11.9–29.8)
CLOT STIFF PLT CONT BLD CALC: 6.8 HPA (ref 11.9–29.8)
CLOT STIFF PLT CONT BLD CALC: 7.2 HPA (ref 11.9–29.8)
CLOT STIFF PLT CONT BLD CALC: 8.7 HPA (ref 11.9–29.8)
CLOT STIFF TF IND P HEP NEUT BLD US: 12.1 HPA (ref 13–33.2)
CLOT STIFF TF IND P HEP NEUT BLD US: 13.7 HPA (ref 13–33.2)
CLOT STIFF TF IND P HEP NEUT BLD US: 14.7 HPA (ref 13–33.2)
CLOT STIFF TF IND P HEP NEUT BLD US: 14.8 HPA (ref 13–33.2)
CLOT STIFF TF IND P HEP NEUT BLD US: 17.7 HPA (ref 13–33.2)
CLOT STIFF TF IND P HEP NEUT BLD US: 5.2 HPA (ref 13–33.2)
CLOT STIFF TF IND P HEP NEUT BLD US: 7.3 HPA (ref 13–33.2)
CLOT STIFF TF IND P HEP NEUT BLD US: 7.9 HPA (ref 13–33.2)
CLOT STIFF TF IND P HEP NEUT BLD US: 9.6 HPA (ref 13–33.2)
CLOT STIFF TF IND+IIB-IIIA INH P HEP NEU: 0.4 HPA (ref 1–3.7)
CLOT STIFF TF IND+IIB-IIIA INH P HEP NEU: 0.5 HPA (ref 1–3.7)
CLOT STIFF TF IND+IIB-IIIA INH P HEP NEU: 0.7 HPA (ref 1–3.7)
CLOT STIFF TF IND+IIB-IIIA INH P HEP NEU: 0.9 HPA (ref 1–3.7)
CLOT STIFF TF IND+IIB-IIIA INH P HEP NEU: 1.1 HPA (ref 1–3.7)
CLOT STIFF TF IND+IIB-IIIA INH P HEP NEU: 1.2 HPA (ref 1–3.7)
CLOT STIFF TF IND+IIB-IIIA INH P HEP NEU: 1.2 HPA (ref 1–3.7)
CLOT STIFF TF IND+IIB-IIIA INH P HEP NEU: 1.3 HPA (ref 1–3.7)
CLOT STIFF TF IND+IIB-IIIA INH P HEP NEU: 1.5 HPA (ref 1–3.7)
CODING SYSTEM: NORMAL
COHGB MFR BLD: 91.2 % (ref 96–97)
COHGB MFR BLD: 93.9 % (ref 96–97)
COHGB MFR BLD: 98.7 % (ref 96–97)
CREAT SERPL-MCNC: 0.89 MG/DL (ref 0.67–1.17)
CROSSMATCH: NORMAL
DEPRECATED HCO3 PLAS-SCNC: 26 MMOL/L (ref 22–29)
DIASTOLIC BLOOD PRESSURE - MUSE: NORMAL MMHG
EGFRCR SERPLBLD CKD-EPI 2021: >90 ML/MIN/1.73M2
EOSINOPHIL # BLD AUTO: 0 10E3/UL (ref 0–0.7)
EOSINOPHIL NFR BLD AUTO: 0 %
ERYTHROCYTE [DISTWIDTH] IN BLOOD BY AUTOMATED COUNT: 18.6 % (ref 10–15)
ERYTHROCYTE [DISTWIDTH] IN BLOOD BY AUTOMATED COUNT: 19 % (ref 10–15)
FIBRINOGEN PPP-MCNC: 143 MG/DL (ref 170–490)
FIBRINOGEN PPP-MCNC: 147 MG/DL (ref 170–490)
GLUCOSE BLD-MCNC: 114 MG/DL (ref 70–99)
GLUCOSE BLD-MCNC: 127 MG/DL (ref 70–99)
GLUCOSE BLD-MCNC: 132 MG/DL (ref 70–99)
GLUCOSE BLD-MCNC: 137 MG/DL (ref 70–99)
GLUCOSE BLD-MCNC: 142 MG/DL (ref 70–99)
GLUCOSE BLD-MCNC: 154 MG/DL (ref 70–99)
GLUCOSE BLD-MCNC: 234 MG/DL (ref 70–99)
GLUCOSE BLD-MCNC: 235 MG/DL (ref 70–99)
GLUCOSE BLDC GLUCOMTR-MCNC: 105 MG/DL (ref 70–99)
GLUCOSE BLDC GLUCOMTR-MCNC: 110 MG/DL (ref 70–99)
GLUCOSE BLDC GLUCOMTR-MCNC: 111 MG/DL (ref 70–99)
GLUCOSE BLDC GLUCOMTR-MCNC: 118 MG/DL (ref 70–99)
GLUCOSE BLDC GLUCOMTR-MCNC: 119 MG/DL (ref 70–99)
GLUCOSE BLDC GLUCOMTR-MCNC: 131 MG/DL (ref 70–99)
GLUCOSE BLDC GLUCOMTR-MCNC: 150 MG/DL (ref 70–99)
GLUCOSE BLDC GLUCOMTR-MCNC: 200 MG/DL (ref 70–99)
GLUCOSE BLDC GLUCOMTR-MCNC: 202 MG/DL (ref 70–99)
GLUCOSE BLDC GLUCOMTR-MCNC: 215 MG/DL (ref 70–99)
GLUCOSE BLDC GLUCOMTR-MCNC: 216 MG/DL (ref 70–99)
GLUCOSE BLDC GLUCOMTR-MCNC: 226 MG/DL (ref 70–99)
GLUCOSE BLDC GLUCOMTR-MCNC: 226 MG/DL (ref 70–99)
GLUCOSE BLDC GLUCOMTR-MCNC: 59 MG/DL (ref 70–99)
GLUCOSE BLDC GLUCOMTR-MCNC: 65 MG/DL (ref 70–99)
GLUCOSE BLDC GLUCOMTR-MCNC: 77 MG/DL (ref 70–99)
GLUCOSE BLDC GLUCOMTR-MCNC: 99 MG/DL (ref 70–99)
GLUCOSE SERPL-MCNC: 228 MG/DL (ref 70–99)
GRAM STAIN RESULT: NORMAL
GRAM STAIN RESULT: NORMAL
HBV DNA SERPL QL NAA+PROBE: NORMAL
HCO3 BLD-SCNC: 25 MMOL/L (ref 21–28)
HCO3 BLD-SCNC: 25 MMOL/L (ref 21–28)
HCO3 BLD-SCNC: 26 MMOL/L (ref 21–28)
HCO3 BLDA-SCNC: 22 MMOL/L (ref 21–28)
HCO3 BLDA-SCNC: 23 MMOL/L (ref 21–28)
HCO3 BLDA-SCNC: 24 MMOL/L (ref 21–28)
HCO3 BLDA-SCNC: 25 MMOL/L (ref 21–28)
HCO3 BLDA-SCNC: 26 MMOL/L (ref 21–28)
HCO3 BLDA-SCNC: 26 MMOL/L (ref 21–28)
HCO3 BLDA-SCNC: 28 MMOL/L (ref 21–28)
HCO3 BLDA-SCNC: 29 MMOL/L (ref 21–28)
HCT VFR BLD AUTO: 26.3 % (ref 40–53)
HCT VFR BLD AUTO: 28.9 % (ref 40–53)
HCV RNA SERPL QL NAA+PROBE: NORMAL
HGB BLD-MCNC: 10.2 G/DL (ref 13.3–17.7)
HGB BLD-MCNC: 10.3 G/DL (ref 13.3–17.7)
HGB BLD-MCNC: 6.4 G/DL (ref 13.3–17.7)
HGB BLD-MCNC: 6.4 G/DL (ref 13.3–17.7)
HGB BLD-MCNC: 6.8 G/DL (ref 13.3–17.7)
HGB BLD-MCNC: 7.3 G/DL (ref 13.3–17.7)
HGB BLD-MCNC: 7.4 G/DL (ref 13.3–17.7)
HGB BLD-MCNC: 8.3 G/DL (ref 13.3–17.7)
HGB BLD-MCNC: 8.8 G/DL (ref 13.3–17.7)
HGB BLD-MCNC: 9.2 G/DL (ref 13.3–17.7)
HGB BLD-MCNC: 9.2 G/DL (ref 13.3–17.7)
HGB BLD-MCNC: 9.8 G/DL (ref 13.3–17.7)
HIV1+2 RNA SERPL QL NAA+PROBE: NORMAL
HOLD SPECIMEN: NORMAL
IMM GRANULOCYTES # BLD: 0.4 10E3/UL
IMM GRANULOCYTES NFR BLD: 2 %
INR PPP: 2.12 (ref 0.85–1.15)
INR PPP: 2.12 (ref 0.85–1.15)
INTERPRETATION ECG - MUSE: NORMAL
ISSUE DATE AND TIME: NORMAL
LACTATE BLD-SCNC: 1.6 MMOL/L (ref 0.7–2)
LACTATE BLD-SCNC: 1.7 MMOL/L (ref 0.7–2)
LACTATE BLD-SCNC: 1.9 MMOL/L (ref 0.7–2)
LACTATE BLD-SCNC: 2.4 MMOL/L (ref 0.7–2)
LACTATE BLD-SCNC: 3 MMOL/L (ref 0.7–2)
LACTATE BLD-SCNC: 3 MMOL/L (ref 0.7–2)
LACTATE SERPL-SCNC: 1.9 MMOL/L (ref 0.7–2)
LACTATE SERPL-SCNC: 2.3 MMOL/L (ref 0.7–2)
LACTATE SERPL-SCNC: 2.8 MMOL/L (ref 0.7–2)
LYMPHOCYTES # BLD AUTO: 0.6 10E3/UL (ref 0.8–5.3)
LYMPHOCYTES NFR BLD AUTO: 2 %
MCH RBC QN AUTO: 32.1 PG (ref 26.5–33)
MCH RBC QN AUTO: 33 PG (ref 26.5–33)
MCHC RBC AUTO-ENTMCNC: 33.9 G/DL (ref 31.5–36.5)
MCHC RBC AUTO-ENTMCNC: 35 G/DL (ref 31.5–36.5)
MCV RBC AUTO: 94 FL (ref 78–100)
MCV RBC AUTO: 95 FL (ref 78–100)
MONOCYTES # BLD AUTO: 1.1 10E3/UL (ref 0–1.3)
MONOCYTES NFR BLD AUTO: 5 %
MRSA DNA SPEC QL NAA+PROBE: NEGATIVE
NEUTROPHILS # BLD AUTO: 20.9 10E3/UL (ref 1.6–8.3)
NEUTROPHILS NFR BLD AUTO: 91 %
NRBC # BLD AUTO: 0 10E3/UL
NRBC BLD AUTO-RTO: 0 /100
O2/TOTAL GAS SETTING VFR VENT: 35 %
O2/TOTAL GAS SETTING VFR VENT: 40 %
O2/TOTAL GAS SETTING VFR VENT: 51 %
O2/TOTAL GAS SETTING VFR VENT: 53 %
O2/TOTAL GAS SETTING VFR VENT: 53 %
O2/TOTAL GAS SETTING VFR VENT: 54 %
O2/TOTAL GAS SETTING VFR VENT: 58 %
O2/TOTAL GAS SETTING VFR VENT: 60 %
O2/TOTAL GAS SETTING VFR VENT: 96 %
OXYHGB MFR BLDA: 86 % (ref 92–100)
OXYHGB MFR BLDA: 94 % (ref 92–100)
P AXIS - MUSE: 32 DEGREES
PCO2 BLD: 41 MM HG (ref 35–45)
PCO2 BLD: 53 MM HG (ref 35–45)
PCO2 BLD: 67 MM HG (ref 35–45)
PCO2 BLDA: 35 MM HG (ref 35–45)
PCO2 BLDA: 36 MM HG (ref 35–45)
PCO2 BLDA: 40 MM HG (ref 35–45)
PCO2 BLDA: 40 MM HG (ref 35–45)
PCO2 BLDA: 45 MM HG (ref 35–45)
PCO2 BLDA: 47 MM HG (ref 35–45)
PCO2 BLDA: 48 MM HG (ref 35–45)
PCO2 BLDA: 49 MM HG (ref 35–45)
PH BLD: 7.2 [PH] (ref 7.35–7.45)
PH BLD: 7.28 [PH] (ref 7.35–7.45)
PH BLD: 7.4 [PH] (ref 7.35–7.45)
PH BLDA: 7.34 [PH] (ref 7.35–7.45)
PH BLDA: 7.36 [PH] (ref 7.35–7.45)
PH BLDA: 7.37 [PH] (ref 7.35–7.45)
PH BLDA: 7.38 [PH] (ref 7.35–7.45)
PH BLDA: 7.38 [PH] (ref 7.35–7.45)
PH BLDA: 7.4 [PH] (ref 7.35–7.45)
PH BLDA: 7.42 [PH] (ref 7.35–7.45)
PH BLDA: 7.42 [PH] (ref 7.35–7.45)
PLATELET # BLD AUTO: 192 10E3/UL (ref 150–450)
PLATELET # BLD AUTO: 224 10E3/UL (ref 150–450)
PO2 BLD: 60 MM HG (ref 80–105)
PO2 BLD: 68 MM HG (ref 80–105)
PO2 BLD: 95 MM HG (ref 80–105)
PO2 BLDA: 107 MM HG (ref 80–105)
PO2 BLDA: 115 MM HG (ref 80–105)
PO2 BLDA: 56 MM HG (ref 80–105)
PO2 BLDA: 76 MM HG (ref 80–105)
PO2 BLDA: 78 MM HG (ref 80–105)
PO2 BLDA: 85 MM HG (ref 80–105)
PO2 BLDA: 90 MM HG (ref 80–105)
PO2 BLDA: 90 MM HG (ref 80–105)
POTASSIUM BLD-SCNC: 3.9 MMOL/L (ref 3.4–5.3)
POTASSIUM BLD-SCNC: 4 MMOL/L (ref 3.4–5.3)
POTASSIUM BLD-SCNC: 4.1 MMOL/L (ref 3.4–5.3)
POTASSIUM BLD-SCNC: 4.1 MMOL/L (ref 3.4–5.3)
POTASSIUM BLD-SCNC: 4.2 MMOL/L (ref 3.4–5.3)
POTASSIUM SERPL-SCNC: 4 MMOL/L (ref 3.4–5.3)
PR INTERVAL - MUSE: 142 MS
PROT SERPL-MCNC: 3.2 G/DL (ref 6.4–8.3)
QRS DURATION - MUSE: 98 MS
QT - MUSE: 316 MS
QTC - MUSE: 376 MS
R AXIS - MUSE: 5 DEGREES
RADIOLOGIST FLAGS: ABNORMAL
RBC # BLD AUTO: 2.79 10E6/UL (ref 4.4–5.9)
RBC # BLD AUTO: 3.05 10E6/UL (ref 4.4–5.9)
SA TARGET DNA: NEGATIVE
SAO2 % BLDA: 100 % (ref 96–97)
SAO2 % BLDA: 100 % (ref 96–97)
SAO2 % BLDA: 87 % (ref 92–100)
SAO2 % BLDA: 90 % (ref 92–100)
SAO2 % BLDA: 90 % (ref 96–97)
SAO2 % BLDA: 95 % (ref 92–100)
SAO2 % BLDA: 97 % (ref 96–97)
SAO2 % BLDA: 98 % (ref 96–97)
SAO2 % BLDA: 99 % (ref 96–97)
SODIUM BLD-SCNC: 133 MMOL/L (ref 135–145)
SODIUM BLD-SCNC: 134 MMOL/L (ref 135–145)
SODIUM SERPL-SCNC: 135 MMOL/L (ref 135–145)
SYSTOLIC BLOOD PRESSURE - MUSE: NORMAL MMHG
T AXIS - MUSE: 104 DEGREES
UNIT ABO/RH: NORMAL
UNIT NUMBER: NORMAL
UNIT STATUS: NORMAL
UNIT TYPE ISBT: 5100
UNIT TYPE ISBT: 6200
UNIT TYPE ISBT: 7300
UNIT TYPE ISBT: 7300
VENTRICULAR RATE- MUSE: 85 BPM
WBC # BLD AUTO: 17 10E3/UL (ref 4–11)
WBC # BLD AUTO: 23.1 10E3/UL (ref 4–11)

## 2024-03-05 PROCEDURE — 85610 PROTHROMBIN TIME: CPT | Performed by: TRANSPLANT SURGERY

## 2024-03-05 PROCEDURE — 71045 X-RAY EXAM CHEST 1 VIEW: CPT | Mod: 26 | Performed by: RADIOLOGY

## 2024-03-05 PROCEDURE — 370N000017 HC ANESTHESIA TECHNICAL FEE, PER MIN: Performed by: TRANSPLANT SURGERY

## 2024-03-05 PROCEDURE — 250N000009 HC RX 250: Performed by: PHYSICIAN ASSISTANT

## 2024-03-05 PROCEDURE — 258N000003 HC RX IP 258 OP 636: Performed by: ANESTHESIOLOGY

## 2024-03-05 PROCEDURE — 82805 BLOOD GASES W/O2 SATURATION: CPT

## 2024-03-05 PROCEDURE — 5A1945Z RESPIRATORY VENTILATION, 24-96 CONSECUTIVE HOURS: ICD-10-PCS | Performed by: TRANSPLANT SURGERY

## 2024-03-05 PROCEDURE — 250N000013 HC RX MED GY IP 250 OP 250 PS 637

## 2024-03-05 PROCEDURE — 87102 FUNGUS ISOLATION CULTURE: CPT | Performed by: TRANSPLANT SURGERY

## 2024-03-05 PROCEDURE — 250N000009 HC RX 250: Performed by: STUDENT IN AN ORGANIZED HEALTH CARE EDUCATION/TRAINING PROGRAM

## 2024-03-05 PROCEDURE — 82962 GLUCOSE BLOOD TEST: CPT

## 2024-03-05 PROCEDURE — 87205 SMEAR GRAM STAIN: CPT | Performed by: TRANSPLANT SURGERY

## 2024-03-05 PROCEDURE — 250N000011 HC RX IP 250 OP 636: Mod: JZ

## 2024-03-05 PROCEDURE — 88304 TISSUE EXAM BY PATHOLOGIST: CPT | Mod: 26 | Performed by: PATHOLOGY

## 2024-03-05 PROCEDURE — 250N000011 HC RX IP 250 OP 636: Performed by: TRANSPLANT SURGERY

## 2024-03-05 PROCEDURE — 999N000157 HC STATISTIC RCP TIME EA 10 MIN

## 2024-03-05 PROCEDURE — 82805 BLOOD GASES W/O2 SATURATION: CPT | Performed by: STUDENT IN AN ORGANIZED HEALTH CARE EDUCATION/TRAINING PROGRAM

## 2024-03-05 PROCEDURE — 0JN80ZZ RELEASE ABDOMEN SUBCUTANEOUS TISSUE AND FASCIA, OPEN APPROACH: ICD-10-PCS | Performed by: TRANSPLANT SURGERY

## 2024-03-05 PROCEDURE — 85730 THROMBOPLASTIN TIME PARTIAL: CPT | Performed by: TRANSPLANT SURGERY

## 2024-03-05 PROCEDURE — 93975 VASCULAR STUDY: CPT

## 2024-03-05 PROCEDURE — 88307 TISSUE EXAM BY PATHOLOGIST: CPT | Mod: 26 | Performed by: PATHOLOGY

## 2024-03-05 PROCEDURE — 250N000011 HC RX IP 250 OP 636

## 2024-03-05 PROCEDURE — 88313 SPECIAL STAINS GROUP 2: CPT | Mod: TC | Performed by: TRANSPLANT SURGERY

## 2024-03-05 PROCEDURE — 87070 CULTURE OTHR SPECIMN AEROBIC: CPT | Performed by: TRANSPLANT SURGERY

## 2024-03-05 PROCEDURE — 250N000012 HC RX MED GY IP 250 OP 636 PS 637: Performed by: STUDENT IN AN ORGANIZED HEALTH CARE EDUCATION/TRAINING PROGRAM

## 2024-03-05 PROCEDURE — 0FY00Z0 TRANSPLANTATION OF LIVER, ALLOGENEIC, OPEN APPROACH: ICD-10-PCS | Performed by: TRANSPLANT SURGERY

## 2024-03-05 PROCEDURE — 250N000011 HC RX IP 250 OP 636: Performed by: PHYSICIAN ASSISTANT

## 2024-03-05 PROCEDURE — 85049 AUTOMATED PLATELET COUNT: CPT | Performed by: TRANSPLANT SURGERY

## 2024-03-05 PROCEDURE — P9045 ALBUMIN (HUMAN), 5%, 250 ML: HCPCS | Mod: JZ

## 2024-03-05 PROCEDURE — 93975 VASCULAR STUDY: CPT | Mod: 26 | Performed by: RADIOLOGY

## 2024-03-05 PROCEDURE — 36415 COLL VENOUS BLD VENIPUNCTURE: CPT

## 2024-03-05 PROCEDURE — 258N000001 HC RX 258: Performed by: PHYSICIAN ASSISTANT

## 2024-03-05 PROCEDURE — 94002 VENT MGMT INPAT INIT DAY: CPT

## 2024-03-05 PROCEDURE — 272N000001 HC OR GENERAL SUPPLY STERILE: Performed by: TRANSPLANT SURGERY

## 2024-03-05 PROCEDURE — 999N000065 XR CHEST PORT 1 VIEW

## 2024-03-05 PROCEDURE — 83605 ASSAY OF LACTIC ACID: CPT | Performed by: STUDENT IN AN ORGANIZED HEALTH CARE EDUCATION/TRAINING PROGRAM

## 2024-03-05 PROCEDURE — 250N000009 HC RX 250: Performed by: ANESTHESIOLOGY

## 2024-03-05 PROCEDURE — 250N000009 HC RX 250: Performed by: TRANSPLANT SURGERY

## 2024-03-05 PROCEDURE — 258N000003 HC RX IP 258 OP 636: Performed by: TRANSPLANT SURGERY

## 2024-03-05 PROCEDURE — 84132 ASSAY OF SERUM POTASSIUM: CPT | Performed by: STUDENT IN AN ORGANIZED HEALTH CARE EDUCATION/TRAINING PROGRAM

## 2024-03-05 PROCEDURE — 85018 HEMOGLOBIN: CPT | Performed by: STUDENT IN AN ORGANIZED HEALTH CARE EDUCATION/TRAINING PROGRAM

## 2024-03-05 PROCEDURE — 258N000003 HC RX IP 258 OP 636

## 2024-03-05 PROCEDURE — P9059 PLASMA, FRZ BETWEEN 8-24HOUR: HCPCS | Performed by: STUDENT IN AN ORGANIZED HEALTH CARE EDUCATION/TRAINING PROGRAM

## 2024-03-05 PROCEDURE — 82330 ASSAY OF CALCIUM: CPT

## 2024-03-05 PROCEDURE — C9113 INJ PANTOPRAZOLE SODIUM, VIA: HCPCS

## 2024-03-05 PROCEDURE — 88313 SPECIAL STAINS GROUP 2: CPT | Mod: 26 | Performed by: PATHOLOGY

## 2024-03-05 PROCEDURE — 85396 CLOTTING ASSAY WHOLE BLOOD: CPT

## 2024-03-05 PROCEDURE — 250N000024 HC ISOFLURANE, PER MIN: Performed by: TRANSPLANT SURGERY

## 2024-03-05 PROCEDURE — 0FT00ZZ RESECTION OF LIVER, OPEN APPROACH: ICD-10-PCS | Performed by: TRANSPLANT SURGERY

## 2024-03-05 PROCEDURE — C1887 CATHETER, GUIDING: HCPCS | Performed by: TRANSPLANT SURGERY

## 2024-03-05 PROCEDURE — 74018 RADEX ABDOMEN 1 VIEW: CPT

## 2024-03-05 PROCEDURE — 47135 TRANSPLANTATION OF LIVER: CPT

## 2024-03-05 PROCEDURE — 99291 CRITICAL CARE FIRST HOUR: CPT | Mod: 24 | Performed by: SURGERY

## 2024-03-05 PROCEDURE — P9037 PLATE PHERES LEUKOREDU IRRAD: HCPCS

## 2024-03-05 PROCEDURE — 83605 ASSAY OF LACTIC ACID: CPT | Performed by: TRANSPLANT SURGERY

## 2024-03-05 PROCEDURE — 410N000003 HC PER-PERFUSION 1ST 30 MIN: Performed by: TRANSPLANT SURGERY

## 2024-03-05 PROCEDURE — 85384 FIBRINOGEN ACTIVITY: CPT | Performed by: STUDENT IN AN ORGANIZED HEALTH CARE EDUCATION/TRAINING PROGRAM

## 2024-03-05 PROCEDURE — P9016 RBC LEUKOCYTES REDUCED: HCPCS

## 2024-03-05 PROCEDURE — P9016 RBC LEUKOCYTES REDUCED: HCPCS | Performed by: STUDENT IN AN ORGANIZED HEALTH CARE EDUCATION/TRAINING PROGRAM

## 2024-03-05 PROCEDURE — 250N000013 HC RX MED GY IP 250 OP 250 PS 637: Performed by: STUDENT IN AN ORGANIZED HEALTH CARE EDUCATION/TRAINING PROGRAM

## 2024-03-05 PROCEDURE — 87641 MR-STAPH DNA AMP PROBE: CPT | Performed by: STUDENT IN AN ORGANIZED HEALTH CARE EDUCATION/TRAINING PROGRAM

## 2024-03-05 PROCEDURE — 87075 CULTR BACTERIA EXCEPT BLOOD: CPT | Performed by: TRANSPLANT SURGERY

## 2024-03-05 PROCEDURE — 250N000011 HC RX IP 250 OP 636: Performed by: ANESTHESIOLOGY

## 2024-03-05 PROCEDURE — 258N000003 HC RX IP 258 OP 636: Performed by: PHYSICIAN ASSISTANT

## 2024-03-05 PROCEDURE — 47135 TRANSPLANTATION OF LIVER: CPT | Mod: 22 | Performed by: TRANSPLANT SURGERY

## 2024-03-05 PROCEDURE — 250N000011 HC RX IP 250 OP 636: Performed by: STUDENT IN AN ORGANIZED HEALTH CARE EDUCATION/TRAINING PROGRAM

## 2024-03-05 PROCEDURE — 36415 COLL VENOUS BLD VENIPUNCTURE: CPT | Performed by: TRANSPLANT SURGERY

## 2024-03-05 PROCEDURE — 272N000085 HC PACK CELL SAVER CSP: Performed by: TRANSPLANT SURGERY

## 2024-03-05 PROCEDURE — 250N000011 HC RX IP 250 OP 636: Mod: JZ | Performed by: TRANSPLANT SURGERY

## 2024-03-05 PROCEDURE — 250N000009 HC RX 250

## 2024-03-05 PROCEDURE — C2617 STENT, NON-COR, TEM W/O DEL: HCPCS | Performed by: TRANSPLANT SURGERY

## 2024-03-05 PROCEDURE — P9012 CRYOPRECIPITATE EACH UNIT: HCPCS | Performed by: STUDENT IN AN ORGANIZED HEALTH CARE EDUCATION/TRAINING PROGRAM

## 2024-03-05 PROCEDURE — 85610 PROTHROMBIN TIME: CPT | Performed by: STUDENT IN AN ORGANIZED HEALTH CARE EDUCATION/TRAINING PROGRAM

## 2024-03-05 PROCEDURE — P9045 ALBUMIN (HUMAN), 5%, 250 ML: HCPCS | Mod: JZ | Performed by: TRANSPLANT SURGERY

## 2024-03-05 PROCEDURE — 74018 RADEX ABDOMEN 1 VIEW: CPT | Mod: 26 | Performed by: RADIOLOGY

## 2024-03-05 PROCEDURE — 200N000002 HC R&B ICU UMMC

## 2024-03-05 PROCEDURE — C1760 CLOSURE DEV, VASC: HCPCS | Performed by: TRANSPLANT SURGERY

## 2024-03-05 PROCEDURE — 85018 HEMOGLOBIN: CPT

## 2024-03-05 PROCEDURE — P9037 PLATE PHERES LEUKOREDU IRRAD: HCPCS | Performed by: STUDENT IN AN ORGANIZED HEALTH CARE EDUCATION/TRAINING PROGRAM

## 2024-03-05 PROCEDURE — 370N000003 HC ANESTHESIA WARD SERVICE: Performed by: STUDENT IN AN ORGANIZED HEALTH CARE EDUCATION/TRAINING PROGRAM

## 2024-03-05 PROCEDURE — 71045 X-RAY EXAM CHEST 1 VIEW: CPT

## 2024-03-05 PROCEDURE — 410N000004: Performed by: TRANSPLANT SURGERY

## 2024-03-05 PROCEDURE — 80076 HEPATIC FUNCTION PANEL: CPT | Performed by: STUDENT IN AN ORGANIZED HEALTH CARE EDUCATION/TRAINING PROGRAM

## 2024-03-05 PROCEDURE — 812N000006 HC ACQUISITION LIVER CADAVER DONOR

## 2024-03-05 PROCEDURE — 360N000078 HC SURGERY LEVEL 5, PER MIN: Performed by: TRANSPLANT SURGERY

## 2024-03-05 PROCEDURE — 47135 TRANSPLANTATION OF LIVER: CPT | Performed by: ANESTHESIOLOGY

## 2024-03-05 PROCEDURE — 85730 THROMBOPLASTIN TIME PARTIAL: CPT | Performed by: STUDENT IN AN ORGANIZED HEALTH CARE EDUCATION/TRAINING PROGRAM

## 2024-03-05 PROCEDURE — 250N000012 HC RX MED GY IP 250 OP 636 PS 637: Mod: JZ | Performed by: TRANSPLANT SURGERY

## 2024-03-05 PROCEDURE — 85384 FIBRINOGEN ACTIVITY: CPT | Performed by: TRANSPLANT SURGERY

## 2024-03-05 DEVICE — SOF-FLEX DOUBLE PIGTAIL URETERAL STENT SET
Type: IMPLANTABLE DEVICE | Site: BILE DUCT | Status: NON-FUNCTIONAL
Brand: SOF-FLEX
Removed: 2024-08-13

## 2024-03-05 RX ORDER — MYCOPHENOLATE MOFETIL 200 MG/ML
750 POWDER, FOR SUSPENSION ORAL
Status: DISCONTINUED | OUTPATIENT
Start: 2024-03-05 | End: 2024-03-11

## 2024-03-05 RX ORDER — FLUCONAZOLE 2 MG/ML
400 INJECTION, SOLUTION INTRAVENOUS EVERY 24 HOURS
Qty: 1400 ML | Refills: 0 | Status: DISCONTINUED | OUTPATIENT
Start: 2024-03-06 | End: 2024-03-06

## 2024-03-05 RX ORDER — METHYLPREDNISOLONE SODIUM SUCCINATE 125 MG/2ML
50 INJECTION, POWDER, LYOPHILIZED, FOR SOLUTION INTRAMUSCULAR; INTRAVENOUS ONCE
Status: CANCELLED | OUTPATIENT
Start: 2024-03-08 | End: 2024-03-08

## 2024-03-05 RX ORDER — ASPIRIN 325 MG
325 TABLET, DELAYED RELEASE (ENTERIC COATED) ORAL DAILY
Status: DISCONTINUED | OUTPATIENT
Start: 2024-03-06 | End: 2024-03-05

## 2024-03-05 RX ORDER — PANTOPRAZOLE SODIUM 40 MG/1
40 TABLET, DELAYED RELEASE ORAL DAILY
Status: CANCELLED | OUTPATIENT
Start: 2024-03-05

## 2024-03-05 RX ORDER — NALOXONE HYDROCHLORIDE 0.4 MG/ML
0.2 INJECTION, SOLUTION INTRAMUSCULAR; INTRAVENOUS; SUBCUTANEOUS
Status: DISCONTINUED | OUTPATIENT
Start: 2024-03-05 | End: 2024-03-21 | Stop reason: HOSPADM

## 2024-03-05 RX ORDER — NOREPINEPHRINE BITARTRATE 0.06 MG/ML
.01-.6 INJECTION, SOLUTION INTRAVENOUS CONTINUOUS
Status: DISCONTINUED | OUTPATIENT
Start: 2024-03-05 | End: 2024-03-08

## 2024-03-05 RX ORDER — FENTANYL CITRATE 50 UG/ML
INJECTION, SOLUTION INTRAMUSCULAR; INTRAVENOUS PRN
Status: DISCONTINUED | OUTPATIENT
Start: 2024-03-05 | End: 2024-03-05

## 2024-03-05 RX ORDER — METHYLPREDNISOLONE SODIUM SUCCINATE 125 MG/2ML
50 INJECTION, POWDER, LYOPHILIZED, FOR SOLUTION INTRAMUSCULAR; INTRAVENOUS ONCE
Status: COMPLETED | OUTPATIENT
Start: 2024-03-08 | End: 2024-03-08

## 2024-03-05 RX ORDER — SULFAMETHOXAZOLE AND TRIMETHOPRIM 400; 80 MG/1; MG/1
1 TABLET ORAL DAILY
Status: DISCONTINUED | OUTPATIENT
Start: 2024-03-06 | End: 2024-03-07

## 2024-03-05 RX ORDER — SODIUM CHLORIDE, SODIUM LACTATE, POTASSIUM CHLORIDE, CALCIUM CHLORIDE 600; 310; 30; 20 MG/100ML; MG/100ML; MG/100ML; MG/100ML
INJECTION, SOLUTION INTRAVENOUS CONTINUOUS PRN
Status: DISCONTINUED | OUTPATIENT
Start: 2024-03-05 | End: 2024-03-05

## 2024-03-05 RX ORDER — PIPERACILLIN SODIUM, TAZOBACTAM SODIUM 3; .375 G/15ML; G/15ML
3.38 INJECTION, POWDER, LYOPHILIZED, FOR SOLUTION INTRAVENOUS EVERY 6 HOURS
Status: CANCELLED | OUTPATIENT
Start: 2024-03-05 | End: 2024-03-07

## 2024-03-05 RX ORDER — FIBRINOGEN (HUMAN) 700-1300MG
1350 KIT INTRAVENOUS ONCE
Status: COMPLETED | OUTPATIENT
Start: 2024-03-05 | End: 2024-03-05

## 2024-03-05 RX ORDER — PROPOFOL 10 MG/ML
INJECTION, EMULSION INTRAVENOUS
Status: COMPLETED
Start: 2024-03-05 | End: 2024-03-05

## 2024-03-05 RX ORDER — NYSTATIN 100000/ML
10 SUSPENSION, ORAL (FINAL DOSE FORM) ORAL 4 TIMES DAILY
Status: DISCONTINUED | OUTPATIENT
Start: 2024-03-13 | End: 2024-03-06

## 2024-03-05 RX ORDER — PIPERACILLIN SODIUM, TAZOBACTAM SODIUM 3; .375 G/15ML; G/15ML
3.38 INJECTION, POWDER, LYOPHILIZED, FOR SOLUTION INTRAVENOUS EVERY 6 HOURS
Qty: 300 ML | Refills: 0 | Status: DISCONTINUED | OUTPATIENT
Start: 2024-03-05 | End: 2024-03-05

## 2024-03-05 RX ORDER — POLYETHYLENE GLYCOL 3350 17 G/17G
17 POWDER, FOR SOLUTION ORAL DAILY
Status: DISCONTINUED | OUTPATIENT
Start: 2024-03-05 | End: 2024-03-06

## 2024-03-05 RX ORDER — PANTOPRAZOLE SODIUM 40 MG/1
40 TABLET, DELAYED RELEASE ORAL DAILY
Status: DISCONTINUED | OUTPATIENT
Start: 2024-03-05 | End: 2024-03-05

## 2024-03-05 RX ORDER — FIBRINOGEN (HUMAN) 700-1300MG
1350 KIT INTRAVENOUS
Status: DISCONTINUED | OUTPATIENT
Start: 2024-03-05 | End: 2024-03-06

## 2024-03-05 RX ORDER — VALGANCICLOVIR 450 MG/1
450 TABLET, FILM COATED ORAL DAILY
Status: DISCONTINUED | OUTPATIENT
Start: 2024-03-05 | End: 2024-03-06

## 2024-03-05 RX ORDER — CHLORHEXIDINE GLUCONATE ORAL RINSE 1.2 MG/ML
15 SOLUTION DENTAL EVERY 12 HOURS
Status: DISCONTINUED | OUTPATIENT
Start: 2024-03-05 | End: 2024-03-08

## 2024-03-05 RX ORDER — PREDNISONE 10 MG/1
10 TABLET ORAL ONCE
Status: CANCELLED | OUTPATIENT
Start: 2024-03-10 | End: 2024-03-10

## 2024-03-05 RX ORDER — HYDROMORPHONE HCL IN WATER/PF 6 MG/30 ML
0.2 PATIENT CONTROLLED ANALGESIA SYRINGE INTRAVENOUS
Status: DISCONTINUED | OUTPATIENT
Start: 2024-03-05 | End: 2024-03-09

## 2024-03-05 RX ORDER — PREDNISONE 10 MG/1
10 TABLET ORAL ONCE
Status: COMPLETED | OUTPATIENT
Start: 2024-03-10 | End: 2024-03-10

## 2024-03-05 RX ORDER — FIBRINOGEN (HUMAN) 700-1300MG
1350 KIT INTRAVENOUS ONCE
Status: DISCONTINUED | OUTPATIENT
Start: 2024-03-05 | End: 2024-03-06

## 2024-03-05 RX ORDER — OXYCODONE HYDROCHLORIDE 5 MG/1
5 TABLET ORAL EVERY 4 HOURS PRN
Status: DISCONTINUED | OUTPATIENT
Start: 2024-03-05 | End: 2024-03-06

## 2024-03-05 RX ORDER — VALGANCICLOVIR 450 MG/1
450 TABLET, FILM COATED ORAL DAILY
Status: CANCELLED | OUTPATIENT
Start: 2024-03-05

## 2024-03-05 RX ORDER — VASOPRESSIN IN 0.9 % NACL 2 UNIT/2ML
SYRINGE (ML) INTRAVENOUS PRN
Status: DISCONTINUED | OUTPATIENT
Start: 2024-03-05 | End: 2024-03-05

## 2024-03-05 RX ORDER — PROPOFOL 10 MG/ML
5-75 INJECTION, EMULSION INTRAVENOUS CONTINUOUS
Status: DISCONTINUED | OUTPATIENT
Start: 2024-03-05 | End: 2024-03-06

## 2024-03-05 RX ORDER — NALOXONE HYDROCHLORIDE 0.4 MG/ML
0.4 INJECTION, SOLUTION INTRAMUSCULAR; INTRAVENOUS; SUBCUTANEOUS
Status: DISCONTINUED | OUTPATIENT
Start: 2024-03-05 | End: 2024-03-21 | Stop reason: HOSPADM

## 2024-03-05 RX ORDER — LIDOCAINE HYDROCHLORIDE 20 MG/ML
INJECTION, SOLUTION INFILTRATION; PERINEURAL PRN
Status: DISCONTINUED | OUTPATIENT
Start: 2024-03-05 | End: 2024-03-05

## 2024-03-05 RX ORDER — METHYLPREDNISOLONE SODIUM SUCCINATE 125 MG/2ML
100 INJECTION, POWDER, LYOPHILIZED, FOR SOLUTION INTRAMUSCULAR; INTRAVENOUS ONCE
Status: CANCELLED | OUTPATIENT
Start: 2024-03-07 | End: 2024-03-07

## 2024-03-05 RX ORDER — METHYLPREDNISOLONE SODIUM SUCCINATE 125 MG/2ML
100 INJECTION, POWDER, LYOPHILIZED, FOR SOLUTION INTRAMUSCULAR; INTRAVENOUS ONCE
Status: COMPLETED | OUTPATIENT
Start: 2024-03-07 | End: 2024-03-07

## 2024-03-05 RX ORDER — CALCIUM CHLORIDE 100 MG/ML
INJECTION INTRAVENOUS; INTRAVENTRICULAR PRN
Status: DISCONTINUED | OUTPATIENT
Start: 2024-03-05 | End: 2024-03-05

## 2024-03-05 RX ORDER — PIPERACILLIN SODIUM, TAZOBACTAM SODIUM 3; .375 G/15ML; G/15ML
3.38 INJECTION, POWDER, LYOPHILIZED, FOR SOLUTION INTRAVENOUS EVERY 6 HOURS
Status: DISCONTINUED | OUTPATIENT
Start: 2024-03-05 | End: 2024-03-05

## 2024-03-05 RX ORDER — VALGANCICLOVIR HYDROCHLORIDE 50 MG/ML
450 POWDER, FOR SOLUTION ORAL DAILY
Status: CANCELLED | OUTPATIENT
Start: 2024-03-05

## 2024-03-05 RX ORDER — AMOXICILLIN 250 MG
1-2 CAPSULE ORAL 2 TIMES DAILY
Status: DISCONTINUED | OUTPATIENT
Start: 2024-03-05 | End: 2024-03-06

## 2024-03-05 RX ORDER — FLUCONAZOLE 2 MG/ML
400 INJECTION, SOLUTION INTRAVENOUS EVERY 24 HOURS
Status: CANCELLED | OUTPATIENT
Start: 2024-03-05 | End: 2024-03-12

## 2024-03-05 RX ORDER — FIBRINOGEN (HUMAN) 700-1300MG
1350 KIT INTRAVENOUS
Status: COMPLETED | OUTPATIENT
Start: 2024-03-05 | End: 2024-03-05

## 2024-03-05 RX ORDER — URSODIOL 300 MG/1
300 CAPSULE ORAL 2 TIMES DAILY
Status: DISCONTINUED | OUTPATIENT
Start: 2024-03-05 | End: 2024-03-13

## 2024-03-05 RX ORDER — MYCOPHENOLATE MOFETIL 250 MG/1
750 CAPSULE ORAL
Status: DISCONTINUED | OUTPATIENT
Start: 2024-03-05 | End: 2024-03-13

## 2024-03-05 RX ORDER — VERAPAMIL HYDROCHLORIDE 2.5 MG/ML
INJECTION, SOLUTION INTRAVENOUS PRN
Status: DISCONTINUED | OUTPATIENT
Start: 2024-03-05 | End: 2024-03-05 | Stop reason: HOSPADM

## 2024-03-05 RX ORDER — TACROLIMUS 1 MG/1
2 CAPSULE ORAL
Status: CANCELLED | OUTPATIENT
Start: 2024-03-05

## 2024-03-05 RX ORDER — HYDROMORPHONE HCL IN WATER/PF 6 MG/30 ML
0.4 PATIENT CONTROLLED ANALGESIA SYRINGE INTRAVENOUS
Status: DISCONTINUED | OUTPATIENT
Start: 2024-03-05 | End: 2024-03-09

## 2024-03-05 RX ORDER — URSODIOL 300 MG/1
300 CAPSULE ORAL 2 TIMES DAILY
Status: CANCELLED | OUTPATIENT
Start: 2024-03-05

## 2024-03-05 RX ORDER — MYCOPHENOLATE MOFETIL 250 MG/1
750 CAPSULE ORAL
Status: CANCELLED | OUTPATIENT
Start: 2024-03-05

## 2024-03-05 RX ORDER — SODIUM CHLORIDE, SODIUM GLUCONATE, SODIUM ACETATE, POTASSIUM CHLORIDE AND MAGNESIUM CHLORIDE 526; 502; 368; 37; 30 MG/100ML; MG/100ML; MG/100ML; MG/100ML; MG/100ML
INJECTION, SOLUTION INTRAVENOUS CONTINUOUS PRN
Status: DISCONTINUED | OUTPATIENT
Start: 2024-03-05 | End: 2024-03-05

## 2024-03-05 RX ORDER — OXYCODONE HYDROCHLORIDE 10 MG/1
10 TABLET ORAL EVERY 4 HOURS PRN
Status: DISCONTINUED | OUTPATIENT
Start: 2024-03-05 | End: 2024-03-06

## 2024-03-05 RX ORDER — FIBRINOGEN (HUMAN) 700-1300MG
6 KIT INTRAVENOUS ONCE
Status: DISCONTINUED | OUTPATIENT
Start: 2024-03-05 | End: 2024-03-05

## 2024-03-05 RX ORDER — VALGANCICLOVIR HYDROCHLORIDE 50 MG/ML
450 POWDER, FOR SOLUTION ORAL DAILY
Status: DISCONTINUED | OUTPATIENT
Start: 2024-03-05 | End: 2024-03-06

## 2024-03-05 RX ORDER — ASPIRIN 325 MG
325 TABLET ORAL DAILY
Status: DISCONTINUED | OUTPATIENT
Start: 2024-03-06 | End: 2024-03-06

## 2024-03-05 RX ORDER — PROPOFOL 10 MG/ML
INJECTION, EMULSION INTRAVENOUS PRN
Status: DISCONTINUED | OUTPATIENT
Start: 2024-03-05 | End: 2024-03-05

## 2024-03-05 RX ORDER — PAPAVERINE HYDROCHLORIDE 30 MG/ML
INJECTION INTRAMUSCULAR; INTRAVENOUS PRN
Status: DISCONTINUED | OUTPATIENT
Start: 2024-03-05 | End: 2024-03-05 | Stop reason: HOSPADM

## 2024-03-05 RX ORDER — TACROLIMUS 1 MG/1
2 CAPSULE ORAL
Status: DISCONTINUED | OUTPATIENT
Start: 2024-03-05 | End: 2024-03-09

## 2024-03-05 RX ORDER — ONDANSETRON 2 MG/ML
INJECTION INTRAMUSCULAR; INTRAVENOUS PRN
Status: DISCONTINUED | OUTPATIENT
Start: 2024-03-05 | End: 2024-03-05

## 2024-03-05 RX ORDER — MAGNESIUM SULFATE HEPTAHYDRATE 40 MG/ML
INJECTION, SOLUTION INTRAVENOUS PRN
Status: DISCONTINUED | OUTPATIENT
Start: 2024-03-05 | End: 2024-03-05

## 2024-03-05 RX ORDER — PIPERACILLIN SODIUM, TAZOBACTAM SODIUM 4; .5 G/20ML; G/20ML
4.5 INJECTION, POWDER, LYOPHILIZED, FOR SOLUTION INTRAVENOUS EVERY 6 HOURS
Status: DISCONTINUED | OUTPATIENT
Start: 2024-03-06 | End: 2024-03-06

## 2024-03-05 RX ORDER — MYCOPHENOLATE MOFETIL 200 MG/ML
750 POWDER, FOR SUSPENSION ORAL
Status: CANCELLED | OUTPATIENT
Start: 2024-03-05

## 2024-03-05 RX ADMIN — CHLORHEXIDINE GLUCONATE 15 ML: 1.2 SOLUTION ORAL at 19:42

## 2024-03-05 RX ADMIN — Medication 2 UNITS: at 12:43

## 2024-03-05 RX ADMIN — Medication 75 MCG/HR: at 22:11

## 2024-03-05 RX ADMIN — PANTOPRAZOLE SODIUM 40 MG: 40 INJECTION, POWDER, FOR SOLUTION INTRAVENOUS at 17:40

## 2024-03-05 RX ADMIN — CALCIUM CHLORIDE INJECTION 1 G: 100 INJECTION, SOLUTION INTRAVENOUS at 11:35

## 2024-03-05 RX ADMIN — PROTHROMBIN, COAGULATION FACTOR VII HUMAN, COAGULATION FACTOR IX HUMAN, COAGULATION FACTOR X HUMAN, PROTEIN C, PROTEIN S HUMAN, AND WATER 1094 UNITS: KIT at 10:08

## 2024-03-05 RX ADMIN — FIBRINOGEN (HUMAN) 1350 MG: KIT INTRAVENOUS at 11:11

## 2024-03-05 RX ADMIN — SODIUM CHLORIDE, SODIUM GLUCONATE, SODIUM ACETATE, POTASSIUM CHLORIDE AND MAGNESIUM CHLORIDE: 526; 502; 368; 37; 30 INJECTION, SOLUTION INTRAVENOUS at 11:26

## 2024-03-05 RX ADMIN — MAGNESIUM SULFATE HEPTAHYDRATE 2 G: 40 INJECTION, SOLUTION INTRAVENOUS at 11:42

## 2024-03-05 RX ADMIN — MYCOPHENOLATE MOFETIL 750 MG: 200 POWDER, FOR SUSPENSION ORAL at 17:56

## 2024-03-05 RX ADMIN — HYDROMORPHONE HYDROCHLORIDE 0.5 MG: 1 INJECTION, SOLUTION INTRAMUSCULAR; INTRAVENOUS; SUBCUTANEOUS at 15:15

## 2024-03-05 RX ADMIN — CALCIUM CHLORIDE INJECTION 1 G: 100 INJECTION, SOLUTION INTRAVENOUS at 12:04

## 2024-03-05 RX ADMIN — NOREPINEPHRINE BITARTRATE 6.4 MCG: 1 INJECTION, SOLUTION, CONCENTRATE INTRAVENOUS at 09:23

## 2024-03-05 RX ADMIN — DEXTROSE MONOHYDRATE 25 ML: 25 INJECTION, SOLUTION INTRAVENOUS at 00:24

## 2024-03-05 RX ADMIN — FIBRINOGEN (HUMAN) 1350 MG: KIT INTRAVENOUS at 10:35

## 2024-03-05 RX ADMIN — PROPOFOL 170 MG: 10 INJECTION, EMULSION INTRAVENOUS at 09:09

## 2024-03-05 RX ADMIN — Medication 100 MG: at 09:10

## 2024-03-05 RX ADMIN — ONDANSETRON 4 MG: 2 INJECTION INTRAMUSCULAR; INTRAVENOUS at 15:12

## 2024-03-05 RX ADMIN — Medication 50 MG: at 14:28

## 2024-03-05 RX ADMIN — Medication 50 MG: at 12:10

## 2024-03-05 RX ADMIN — PROPOFOL 25 MCG/KG/MIN: 10 INJECTION, EMULSION INTRAVENOUS at 17:26

## 2024-03-05 RX ADMIN — PIPERACILLIN AND TAZOBACTAM 3.38 G: 3; .375 INJECTION, POWDER, FOR SOLUTION INTRAVENOUS at 09:53

## 2024-03-05 RX ADMIN — PROTHROMBIN, COAGULATION FACTOR VII HUMAN, COAGULATION FACTOR IX HUMAN, COAGULATION FACTOR X HUMAN, PROTEIN C, PROTEIN S HUMAN, AND WATER 1094 UNITS: KIT at 14:18

## 2024-03-05 RX ADMIN — SODIUM CHLORIDE, SODIUM GLUCONATE, SODIUM ACETATE, POTASSIUM CHLORIDE AND MAGNESIUM CHLORIDE: 526; 502; 368; 37; 30 INJECTION, SOLUTION INTRAVENOUS at 11:50

## 2024-03-05 RX ADMIN — TACROLIMUS 2 MG: 5 CAPSULE ORAL at 17:56

## 2024-03-05 RX ADMIN — HYDROMORPHONE HYDROCHLORIDE 0.5 MG: 1 INJECTION, SOLUTION INTRAMUSCULAR; INTRAVENOUS; SUBCUTANEOUS at 15:33

## 2024-03-05 RX ADMIN — Medication 2 UNITS: at 11:38

## 2024-03-05 RX ADMIN — Medication 1 UNITS: at 09:31

## 2024-03-05 RX ADMIN — FIBRINOGEN (HUMAN) 1350 MG: KIT INTRAVENOUS at 11:49

## 2024-03-05 RX ADMIN — CALCIUM CHLORIDE INJECTION 500 MG: 100 INJECTION, SOLUTION INTRAVENOUS at 10:29

## 2024-03-05 RX ADMIN — ALBUMIN HUMAN 25 G: 0.05 INJECTION, SOLUTION INTRAVENOUS at 17:40

## 2024-03-05 RX ADMIN — FENTANYL CITRATE 600 MCG: 50 INJECTION INTRAMUSCULAR; INTRAVENOUS at 09:09

## 2024-03-05 RX ADMIN — Medication 50 MG: at 10:18

## 2024-03-05 RX ADMIN — PIPERACILLIN AND TAZOBACTAM 3.38 G: 3; .375 INJECTION, POWDER, FOR SOLUTION INTRAVENOUS at 13:46

## 2024-03-05 RX ADMIN — MICAFUNGIN SODIUM 100 MG: 50 INJECTION, POWDER, LYOPHILIZED, FOR SOLUTION INTRAVENOUS at 09:41

## 2024-03-05 RX ADMIN — Medication 1 UNITS: at 09:23

## 2024-03-05 RX ADMIN — DOCUSATE SODIUM 50 MG AND SENNOSIDES 8.6 MG 1 TABLET: 8.6; 5 TABLET, FILM COATED ORAL at 19:42

## 2024-03-05 RX ADMIN — PROPOFOL 25 MCG/KG/MIN: 10 INJECTION, EMULSION INTRAVENOUS at 21:32

## 2024-03-05 RX ADMIN — SODIUM CHLORIDE, SODIUM GLUCONATE, SODIUM ACETATE, POTASSIUM CHLORIDE AND MAGNESIUM CHLORIDE: 526; 502; 368; 37; 30 INJECTION, SOLUTION INTRAVENOUS at 09:04

## 2024-03-05 RX ADMIN — Medication 2 UNITS: at 12:26

## 2024-03-05 RX ADMIN — LIDOCAINE HYDROCHLORIDE 40 MG: 20 INJECTION, SOLUTION INFILTRATION; PERINEURAL at 09:09

## 2024-03-05 RX ADMIN — NOREPINEPHRINE BITARTRATE 0.12 MCG/KG/MIN: 0.06 INJECTION, SOLUTION INTRAVENOUS at 17:34

## 2024-03-05 RX ADMIN — Medication 1 UNITS: at 09:21

## 2024-03-05 RX ADMIN — PROTHROMBIN, COAGULATION FACTOR VII HUMAN, COAGULATION FACTOR IX HUMAN, COAGULATION FACTOR X HUMAN, PROTEIN C, PROTEIN S HUMAN, AND WATER 1094 UNITS: KIT at 11:44

## 2024-03-05 RX ADMIN — SUGAMMADEX 200 MG: 100 INJECTION, SOLUTION INTRAVENOUS at 15:18

## 2024-03-05 RX ADMIN — FENTANYL CITRATE 100 MCG: 50 INJECTION INTRAMUSCULAR; INTRAVENOUS at 13:17

## 2024-03-05 RX ADMIN — CALCIUM CHLORIDE INJECTION 1 G: 100 INJECTION, SOLUTION INTRAVENOUS at 11:43

## 2024-03-05 RX ADMIN — PIPERACILLIN AND TAZOBACTAM 3.38 G: 3; .375 INJECTION, POWDER, FOR SOLUTION INTRAVENOUS at 19:42

## 2024-03-05 RX ADMIN — Medication 50 MG: at 11:08

## 2024-03-05 RX ADMIN — INSULIN HUMAN 12 UNITS/HR: 1 INJECTION, SOLUTION INTRAVENOUS at 22:14

## 2024-03-05 RX ADMIN — NOREPINEPHRINE BITARTRATE 6.4 MCG: 1 INJECTION, SOLUTION, CONCENTRATE INTRAVENOUS at 11:38

## 2024-03-05 RX ADMIN — CALCIUM CHLORIDE INJECTION 500 MG: 100 INJECTION, SOLUTION INTRAVENOUS at 10:31

## 2024-03-05 RX ADMIN — SODIUM CHLORIDE, SODIUM GLUCONATE, SODIUM ACETATE, POTASSIUM CHLORIDE AND MAGNESIUM CHLORIDE: 526; 502; 368; 37; 30 INJECTION, SOLUTION INTRAVENOUS at 11:35

## 2024-03-05 RX ADMIN — VALGANCICLOVIR HYDROCHLORIDE 450 MG: 50 POWDER, FOR SOLUTION ORAL at 17:56

## 2024-03-05 RX ADMIN — Medication 1 UNITS: at 09:17

## 2024-03-05 RX ADMIN — SODIUM CHLORIDE, SODIUM GLUCONATE, SODIUM ACETATE, POTASSIUM CHLORIDE AND MAGNESIUM CHLORIDE: 526; 502; 368; 37; 30 INJECTION, SOLUTION INTRAVENOUS at 11:30

## 2024-03-05 RX ADMIN — SODIUM CHLORIDE, POTASSIUM CHLORIDE, SODIUM LACTATE AND CALCIUM CHLORIDE: 600; 310; 30; 20 INJECTION, SOLUTION INTRAVENOUS at 09:30

## 2024-03-05 RX ADMIN — INSULIN HUMAN 1.5 UNITS/HR: 1 INJECTION, SOLUTION INTRAVENOUS at 02:10

## 2024-03-05 RX ADMIN — SODIUM CHLORIDE, SODIUM GLUCONATE, SODIUM ACETATE, POTASSIUM CHLORIDE AND MAGNESIUM CHLORIDE: 526; 502; 368; 37; 30 INJECTION, SOLUTION INTRAVENOUS at 11:17

## 2024-03-05 RX ADMIN — NOREPINEPHRINE BITARTRATE 12.8 MCG: 1 INJECTION, SOLUTION, CONCENTRATE INTRAVENOUS at 11:39

## 2024-03-05 RX ADMIN — CALCIUM CHLORIDE INJECTION 1 G: 100 INJECTION, SOLUTION INTRAVENOUS at 11:37

## 2024-03-05 RX ADMIN — Medication 1 UNITS: at 10:33

## 2024-03-05 RX ADMIN — Medication 1250 MG: at 23:47

## 2024-03-05 RX ADMIN — URSODIOL 300 MG: 300 CAPSULE ORAL at 20:19

## 2024-03-05 RX ADMIN — PIPERACILLIN AND TAZOBACTAM 3.38 G: 3; .375 INJECTION, POWDER, FOR SOLUTION INTRAVENOUS at 11:53

## 2024-03-05 RX ADMIN — NOREPINEPHRINE BITARTRATE 6.4 MCG: 1 INJECTION, SOLUTION, CONCENTRATE INTRAVENOUS at 09:21

## 2024-03-05 RX ADMIN — SODIUM CHLORIDE, SODIUM GLUCONATE, SODIUM ACETATE, POTASSIUM CHLORIDE AND MAGNESIUM CHLORIDE: 526; 502; 368; 37; 30 INJECTION, SOLUTION INTRAVENOUS at 12:45

## 2024-03-05 RX ADMIN — ROCURONIUM BROMIDE 50 MG: 10 INJECTION, SOLUTION INTRAVENOUS at 16:40

## 2024-03-05 RX ADMIN — Medication 50 MG: at 13:15

## 2024-03-05 RX ADMIN — SODIUM CHLORIDE, SODIUM GLUCONATE, SODIUM ACETATE, POTASSIUM CHLORIDE AND MAGNESIUM CHLORIDE: 526; 502; 368; 37; 30 INJECTION, SOLUTION INTRAVENOUS at 11:36

## 2024-03-05 RX ADMIN — ALBUMIN HUMAN 25 G: 0.05 INJECTION, SOLUTION INTRAVENOUS at 02:29

## 2024-03-05 RX ADMIN — PROTHROMBIN, COAGULATION FACTOR VII HUMAN, COAGULATION FACTOR IX HUMAN, COAGULATION FACTOR X HUMAN, PROTEIN C, PROTEIN S HUMAN, AND WATER 1094 UNITS: KIT at 10:07

## 2024-03-05 RX ADMIN — ALBUMIN HUMAN 25 G: 0.05 INJECTION, SOLUTION INTRAVENOUS at 17:54

## 2024-03-05 RX ADMIN — Medication 2 UNITS: at 09:10

## 2024-03-05 RX ADMIN — INSULIN HUMAN 3 UNITS/HR: 1 INJECTION, SOLUTION INTRAVENOUS at 09:32

## 2024-03-05 RX ADMIN — PIPERACILLIN AND TAZOBACTAM 3.38 G: 3; .375 INJECTION, POWDER, FOR SOLUTION INTRAVENOUS at 06:53

## 2024-03-05 RX ADMIN — NOREPINEPHRINE BITARTRATE 12.8 MCG: 1 INJECTION, SOLUTION, CONCENTRATE INTRAVENOUS at 12:38

## 2024-03-05 RX ADMIN — HYDROMORPHONE HYDROCHLORIDE 0.5 MG: 1 INJECTION, SOLUTION INTRAMUSCULAR; INTRAVENOUS; SUBCUTANEOUS at 15:19

## 2024-03-05 RX ADMIN — Medication 1250 MG: at 10:54

## 2024-03-05 RX ADMIN — Medication 2 UNITS: at 12:36

## 2024-03-05 RX ADMIN — DEXTROSE AND SODIUM CHLORIDE: 5; 450 INJECTION, SOLUTION INTRAVENOUS at 16:06

## 2024-03-05 RX ADMIN — SODIUM CHLORIDE 1000 MG: 9 INJECTION, SOLUTION INTRAVENOUS at 13:30

## 2024-03-05 RX ADMIN — Medication 1 UNITS: at 09:19

## 2024-03-05 ASSESSMENT — ACTIVITIES OF DAILY LIVING (ADL)
ADLS_ACUITY_SCORE: 38
ADLS_ACUITY_SCORE: 50
ADLS_ACUITY_SCORE: 50
ADLS_ACUITY_SCORE: 38
ADLS_ACUITY_SCORE: 50
ADLS_ACUITY_SCORE: 38

## 2024-03-05 NOTE — PROVIDER NOTIFICATION
[id number] BUTCH Lopez   FYI - pt , insulin gtt currently running per algorithm. Margoth AMADOR, [phone]

## 2024-03-05 NOTE — PROGRESS NOTES
Admitted/transferred from: home   Time of arrival on unit: 1000  2 RN full  skin assessment completed by Margoth AMADOR And Isabela KIRK   Skin assessment finding: scattered bruising on abdomen, bruising on back of left leg, bruising on right chest/neck.    Interventions/actions: no interventions at this time.      Will continue to monitor.

## 2024-03-05 NOTE — CONSULTS
[facility]    ICU History and Physical    Primary Team: Transplant Surgery   Reason for Critical Care Admission: Ventilator Management and Hemodynamic Monitoring  Admitting Physician: Ryder Cortez MD  Date of Admission:  3/4/2024    Assessment: Critical Care   Mary Lopez is a 53 year old male admitted on 3/4/2024. He has a history of alcoholic cirrhosis complicated by hepatic encephalopathy and suspected SBP who presented to the hospital for consideration of  donor liver transplant. He was recently admitted for S.bovis bacteremia and was discharged on amoxicillin and levofloxacin. He underwent DDLT on 3/5/2024 and was admitted to the SICU following his surgery for ventilator management and hemodynamic monitoring. Intra-operatively, patient was transfused 7 U of pRBC and 10 units of platelets. He was extubated in the OR and upon admission to the ICU, he was on oxygen mask, off pressors and sedation. However, became unresponsive shortly after with oxygen saturation dropping to 70s and had to be re-intubated.     Plan: Critical Care   Neuro/ pain/ sedation:  # Post-operative pain   # Hepatic Encephalopathy   - Monitor neurological status. Notify provider for any acute changes in exam  - Sedation: Propofol gtt   - Pain: Fentanyl gtt, PRN Oxy and dilaudid  - Sleep: PTA melatonin and PRN trazodone, will resume once extubated       - PTA lactulose and rifaximin not resumed    Pulmonary:  # Acute hypoxic respiratory failure  # Pleural effusion, left   - Extubated in the OR, had to be re-intubated upon ICU arrival.   - Ventilator settings: CMV - FiO2: 60%, RR: 16, PEEP: 8, TV: 500 ml, will wean as tolerated   - Post-operative/post-intubation CXR/AXR completed - ETT and OGT in appropriate position        Cardiovascular:  # Multifactorial shock (likely hemorrhagic)  - Monitor hemodynamic status.  - MAP goal > 65, on norepinephrine gtt following  intubation (likely due to sedation/paralytics during intubation), will wean as tolerated   - Monitor lactate q4h until normalized   - PTA midodrine currently held  - 1L 5% albumin bolus      GI/Nutrition:  # S/p  Donor Liver Transplant   # Esophageal 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   # Portal Hypertension   - Diet: NPO  - Nutrition consulted. Appreciate recommendations.   - Post-pyloric FT placement and tube feeds to start on POD#1   - OGT in place after intubation, 1:1 replacement with half NS   - Bowel regimen   - Post-transplant prophylaxis: Tacrolimus, Mycophenolate and steroid taper   - Monitor daily hepatic panel   - TIMOTHY drain management per transplant   - Post-operative Liver US pending     Renal/ Fluid Balance:   # Hyponatremia  - Will monitor intake and output  - D5 and 0.45% NaCl @ 100 ml/hr for IV fluid hydration  - ICU electrolyte replacement protocol  - Monitor daily BMP  - PTA torsemide and spironolactone on hold for now       Endocrine:  # Stress Hyperglycemia   # Type 2 Diabetes Mellitus    - Maintain blood glucose levels < 180   - Insulin gtt   - Steroid taper per transplant team     ID:  - Monitor fever curve and daily CBC   - Post-transplant prophylaxis       - Vancomycin/Zosyn x 5 days        - Micafungin x 14 days        - Valcyte     Hematology:  # Acute Blood Loss Anemia  # Macrocytic Anemia   # Chronic Thrombocytopenia  - Monitor daily CBC and q4h Hgb   - Hgb > 8, INR < 2, Fibrinogen > 150, transfuse as needed     MSK:   - PT and OT consulted. Appreciate recommendations.    Lines/ tubes/ drains: PA catheter, R internal jugular CVC x 1, left a-line, PIV x 3 (2 L and 1R), TIMOTHY on the right side of the abdomen    Prophylaxis:  - DVT Prophylaxis: Pneumatic Compression Devices  - PUD Prophylaxis: PPI while intubated     Code Status: Full Code      Disposition:  - ICU    The patient's care was discussed with the Attending Physician, Dr. Starr .    Clinically Significant Risk Factors Present on  "Admission         # Hyponatremia: Lowest Na = 129 mmol/L in last 2 days, will monitor as appropriate    # Hypomagnesemia: Lowest Mg = 1.1 mg/dL in last 2 days, will replace as needed   # Hypoalbuminemia: Lowest albumin = 2.6 g/dL at 3/4/2024 10:58 AM, will monitor as appropriate  # Coagulation Defect: INR = 2.76 (Ref range: 0.85 - 1.15) and/or PTT = 42 Seconds (Ref range: 22 - 38 Seconds), will monitor for bleeding  # Thrombocytopenia: Lowest platelets = 56 in last 2 days, will monitor for bleeding   # Hypertension: Noted on problem list      # Obesity: Estimated body mass index is 35.81 kg/m  as calculated from the following:    Height as of this encounter: 1.73 m (5' 8.11\").    Weight as of this encounter: 107.2 kg (236 lb 4.8 oz).    # Severe Malnutrition: based on nutrition assessment     # Financial/Environmental Concerns: none      # Anemia: based on hgb <11       Nirmal Presley MD  [facility]  Securely message with [employer]rooms (more info)  Text page via [facility] Paging/Directory   _____________________________________________________________________    Chief Complaint   EtOH cirrhosis s/p DDLT     History of Present Illness   Mary Lopez is a 53 year old male with a history of type 2 DM, HTN, HLD, alcoholic cirrhosis complicated by hepatic encephalopathy, hyponatremia, chronic thrombocytopenia, macrocytic anemia and suspected SBP who presented to the hospital for consideration of  donor liver transplant. He was recently admitted for S.bovis bacteremia and was discharged on amoxicillin and levofloxacin. He was reactivate on the transplant list as of 2024, and underwent DDLT on 3/5/2024 as a  donor organ became available.     Review of Systems    The 10 point Review of Systems is negative other than noted in the HPI or here.     Past Medical History    I have reviewed this patient's medical history and updated it with pertinent information if " needed.   Past Medical History:   Diagnosis Date    ANGEL (acute kidney injury) (H24)     Alcoholic cirrhosis of liver without ascites (H) 07/13/2023    Alcoholic hepatitis with ascites (H28) 10/03/2023    Alcoholic hepatitis without ascites (H28) 07/13/2023    Closed fracture of one rib of left side 09/14/2023    Concussion without loss of consciousness 03/11/2020    Decompensated hepatic cirrhosis (H) 09/15/2023    Diabetes mellitus, type 2 (H) 11/22/2023    Essential hypertension 03/11/2020    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)     Mild hyperlipidemia 12/07/2021    Persistent insomnia 07/13/2023    Portal hypertension (H) 07/13/2023    Scrotal abscess     Secondary esophageal 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 (H) 07/13/2023    Tobacco abuse disorder 03/11/2020    Type 2 diabetes mellitus with hyperglycemia (H) 12/22/2023     Past Surgical History   I have reviewed this patient's surgical history and updated it with pertinent information if needed.  Past Surgical History:   Procedure Laterality Date    CHOLECYSTECTOMY      COLONOSCOPY N/A 1/2/2024    Procedure: COLONOSCOPY, WITH POLYPECTOMY;  Surgeon: Jak Urbina MD;  Location:  GI    CV RIGHT HEART CATH MEASUREMENTS RECORDED N/A 1/30/2024    Procedure: Right Heart Catheterization;  Surgeon: Alfred Tafoya MD;  Location:  HEART CARDIAC CATH LAB    TONSILLECTOMY      VASECTOMY       Social History   I have reviewed this patient's social history and updated it with pertinent information if needed.    Social History     Tobacco Use    Smoking status: Former     Types: Cigarettes     Passive exposure: Never    Smokeless tobacco: Current     Types: Chew     Last attempt to quit: 1/1/2004    Tobacco comments:     Chew daily 1/3 of a tin per day   Vaping Use    Vaping Use: Never used   Substance Use Topics    Alcohol use: Not Currently     Alcohol/week: 12.0 standard drinks of alcohol     Types: 12 Standard drinks or equivalent per week      Comment: Sober since 6/25/2023    Drug use: Not Currently     Family History   I have reviewed this patient's family history and updated it with pertinent information if needed.  Family History   Problem Relation Age of Onset    Anxiety Disorder Mother     Depression Mother     Bipolar Disorder Mother     Chronic Obstructive Pulmonary Disease Mother     Lung Cancer Mother 81    Morbid Obesity Father     Diabetes Father     Diabetes Type 2  Brother     Substance Abuse Maternal Grandfather     Substance Abuse Paternal Grandfather     Colon Cancer No family hx of     Liver Disease No family hx of      Prior to Admission Medications   Prior to Admission Medications   Prescriptions Last Dose Informant Patient Reported? Taking?   Continuous Blood Gluc Sensor (DEXCOM G7 SENSOR) MISC   No No   Sig: Change every 10 days.   HYDROcodone-acetaminophen (NORCO) 5-325 MG tablet 3/3/2024  Yes Yes   Sig: Take 1 tablet by mouth every 6 hours as needed for severe pain Patient took 1 tablet last night for pain.   Insulin Lispro (HUMALOG KWIKPEN) 200 UNIT/ML soln 3/4/2024  No Yes   Sig: Inject 8 Units Subcutaneous 4 times daily (with meals and nightly) Give 8 units before each meal. Give additional units for correction with sliding scale (1 unit for each order of 35 blood glucose >140 before meals).   amoxicillin (AMOXIL) 500 MG capsule 3/4/2024  No Yes   Sig: Take 2 capsules (1,000 mg) by mouth every 8 hours for 7 days   blood glucose (NO BRAND SPECIFIED) test strip   No No   Sig: Use to test blood sugar two times daily or as directed. To accompany: Blood Glucose Monitor Brands: per insurance.   blood glucose monitoring (NO BRAND SPECIFIED) meter device kit   No No   Sig: Use to test blood sugar twice times daily or as directed. Preferred blood glucose meter OR supplies to accompany: Blood Glucose Monitor Brands: per insurance.   folic acid (FOLVITE) 1 MG tablet 3/4/2024  No Yes   Sig: Take 1 tablet (1 mg) by mouth daily   insulin  degludec (TRESIBA FLEXTOUCH) 100 UNIT/ML pen 3/3/2024 at 2:30 pm  No Yes   Sig: Inject 36 Units Subcutaneous daily Inject 36 units daily.   insulin lispro (HUMALOG KWIKPEN) 100 UNIT/ML (1 unit dial) KWIKPEN 3/4/2024  No Yes   Sig: Inject 1-10 Units Subcutaneous 3 times daily (before meals) for 50 days Correction scale: Give 1 unit insulin for every order of 35 that blood glucose level is >140 three times daily before meals and for every order of 35 that blood glucose is >200 at bedtime   insulin pen needle (BD PEN NEEDLE SHERRIE 2ND GEN) 32G X 4 MM miscellaneous   No No   Sig: USES 5 PER DAY   lactulose (CHRONULAC) 10 GM/15ML solution 3/4/2024  No Yes   Sig: TAKE 30 MLS BY MOUTH 2 TIMES DAILY   Patient taking differently: TAKE 30 MLS BY MOUTH 3 TIMES DAILY   levofloxacin (LEVAQUIN) 500 MG tablet 3/4/2024  No Yes   Sig: Take 1 tablet (500 mg) by mouth daily for 6 days   melatonin 10 MG TABS tablet 3/3/2024  No Yes   Sig: Take 1 tablet (10 mg) by mouth nightly as needed for sleep   midodrine (PROAMATINE) 10 MG tablet 3/4/2024  No Yes   Sig: Take 1 tablet (10 mg) by mouth every 8 hours for 30 days   multivitamin w/minerals (THERA-VIT-M) tablet 3/4/2024  No Yes   Sig: TAKE 1 TABLET BY MOUTH DAILY   omeprazole (PRILOSEC) 20 MG DR capsule 3/4/2024  Yes Yes   Sig: Take 1 capsule (20 mg) by mouth 2 times daily   potassium chloride ER (KLOR-CON M) 10 MEQ CR tablet 3/4/2024  No Yes   Sig: Take 2 tablets (20 mEq) by mouth 2 times daily   rifaximin (XIFAXAN) 550 MG TABS tablet 3/4/2024  No Yes   Sig: Take 1 tablet (550 mg) by mouth 2 times daily for 180 days   spironolactone (ALDACTONE) 25 MG tablet 3/4/2024  No Yes   Sig: Take 1 tablet (25 mg) by mouth daily   thiamine (B-1) 100 MG tablet 3/4/2024  No Yes   Sig: Take 1 tablet (100 mg) by mouth daily for 30 days   thin (NO BRAND SPECIFIED) lancets   No No   Sig: Use with lanceting device. To accompany: Blood Glucose Monitor Brands: per insurance.   torsemide (DEMADEX) 10 MG tablet  3/4/2024  No Yes   Sig: Take 3 tablets (30 mg) by mouth daily as needed Take as needed for fluid once daily if systolic blood pressure (top number) is 100 or greater.   traZODone (DESYREL) 50 MG tablet 3/3/2024  No Yes   Sig: Take 0.5 tablets (25 mg) by mouth nightly as needed for sleep      Facility-Administered Medications: None     Allergies   Allergies   Allergen Reactions    Other Food Allergy Anaphylaxis     Legumes (black beans, baked beans, chickpeas)    Pineapple Itching       Physical Exam   Vital Signs: Temp: (P) 98.8  F (37.1  C) Temp src: (P) Oral BP: 98/47 Pulse: (P) 85   Resp: (P) 14 SpO2: 94 % O2 Device: (P) None (Room air)    Weight: 236 lbs 4.8 oz    General: Intubated and sedated  HEENT: ETT and OGT in place   Neuro: Unable to assess due to sedation  CV: RRR   Pulm: Equal breath sounds bilaterally   Abd: Non-distended   : Moore in place   Extremities: Warm and well perfused, 1+ pitting edema on BLE   Incisions: Covered with dressing, dry with some saturation      Data   I reviewed all medications, new labs and imaging results over the last 24 hours.    Arterial Blood Gases   Recent Labs   Lab 03/05/24  1102 03/05/24  1024 03/05/24  0927   PH 7.42 7.38 7.38   PCO2 40 47* 49*   PO2 76* 90 107*   HCO3 26 28 29*     Complete Blood Count   Recent Labs   Lab 03/05/24  1102 03/05/24  1024 03/05/24  0927 03/05/24  0323 03/04/24  1058 03/02/24  0435 03/01/24  0434 02/29/24  0554   WBC  --   --   --   --  7.5 10.2 11.0 13.4*   HGB 7.3* 8.8* 7.4* 6.8* 7.4* 7.2* 7.1* 7.7*   PLT  --   --   --   --  56* 54* 53* 59*     Basic Metabolic Panel  Recent Labs   Lab 03/05/24  1102 03/05/24  1024 03/05/24  0927 03/05/24  0759 03/04/24  1257 03/04/24  1058 03/02/24  0751 03/02/24  0435 03/01/24  1030 03/01/24  0434 02/29/24  0807 02/29/24  0554   * 134* 133*  --   --  129*  --  128*  --  132*  --  132*   POTASSIUM 3.9 3.9 4.1  --   --  4.3  --  4.4  --  3.9  --  3.6   CHLORIDE  --   --   --   --   --  96*  --   100  --  104  --  102   CO2  --   --   --   --   --  25  --  22  --  21*  --  23   BUN  --   --   --   --   --  17.5  --  19.3  --  22.7*  --  29.8*   CR  --   --   --   --   --  1.08  --  0.89  --  0.99  --  1.16   * 154* 137* 131*   < > 392*   < > 331*   < > 181*   < > 122*    < > = values in this interval not displayed.     Liver Function Tests  Recent Labs   Lab 03/04/24  1058 03/02/24  0435 03/01/24  0434 02/29/24  0554 02/28/24  0541   AST  --  89* 100* 104* 92*   ALT 50 50 52 51 46   ALKPHOS 181* 191* 166* 194* 191*   BILITOTAL 12.7* 9.8* 9.4* 8.2* 8.6*   ALBUMIN 2.6* 2.5* 2.2* 2.3* 2.4*   INR 2.76* 3.18* 3.13* 3.30* 3.36*     Pancreatic Enzymes  Recent Labs   Lab 03/04/24  1058   AMYLASE 10*     Coagulation Profile  Recent Labs   Lab 03/04/24  1058 03/02/24  0435 03/01/24 0434 02/29/24  0554   INR 2.76* 3.18* 3.13* 3.30*   PTT 42*  --   --   --      IMAGING:  Recent Results (from the past 24 hour(s))   XR Chest 2 Views    Narrative    XR CHEST 2 VIEWS  3/4/2024 11:19 AM     HISTORY:  Pre-op liver transplant       COMPARISON:  Chest radiograph 2/23/2024; CT abdomen/pelvis 2/23/2024    FINDINGS:   Frontal and lateral views of the chest. Trachea is midline. Stable  cardiomediastinal silhouette. Continued left basilar consolidative  opacities and trace left pleural effusion. No pneumothorax. Right  upper quadrant cholecystectomy clips.      Impression    IMPRESSION: Continued trace left pleural effusion and left basilar  consolidative opacities, better evaluated on CT 2/23/2024.     I have personally reviewed the examination and initial interpretation  and I agree with the findings.    SOULEYMANE CALL MD         SYSTEM ID:  [id number]

## 2024-03-05 NOTE — TELEPHONE ENCOUNTER
"Organ Offer Encounter Information    Organ Offer Information  Organ offer date & time: 3/4/2024  5:04 AM  Coordinator/Fellow/Attending name: Brianda Rizvi, RN   Organ(s):  Organ UNOS ID Match Run ID Comment Organ Laterality   Liver [id number] MNOP, primary         Recent infections?: Yes (Comment: covid/sepsis) Recent IV antibiotics?: Pos     New medications?: Yes (Comment: midodrine) Recent pregnancy?: No     Angicoagulation medications?: No Recent vaccinations?: No     Recent blood transfusions?: Yes Recent hospitalizations?: Yes (Comment: last week)   Has your insurance changed in the last 6-12 months?: Neg    Discussed organ offer with: Patient, Spouse  Patient/Caregiver name: Mary and Rosio  Discussed risk category with Patient/Other: N/A  Understood donor criteria, verbalized understanding  Patient/Other asked to speak to a surgeon?: No  Discussed program-specific outcomes: Did not have questions regarding SRTR  Right to decline organ offer without penalty, Patient/Other: Aware of option to decline without penalty  Organ offer decision status Patient/Other: Accepted Offer  Organ disposition: Transplanted  Additional Comments: 3/4/2024 5:09 AM  Liver:: Local, primary  MD: Diego/Delonte   OPO Contact: Luke [phone]  Donor/Recip HCV Status: neg/neg  (HCV+ Donors - Discuss HCV genotyping/quant testing with MD & send Epic in basket message to [facility] HCV POOL - Include Donor UNOS ID)  Donor Nutritional Status: MIV @ 125cc/hr with dextrose  Local or Import Donor: Local  (For import cases, utilize the \"TRANSPORTSETUP\" smart phrase to arrange transportation)  Using [employer] OCS: na  (For OCS Cases, utilize the \"OCSLIVER\" smart phrase)  Plan (NPO, Donor OR): No Donor OR set at this time; requested [id number] 3/5, Luke with [facility] is aware of our request. Called Mary and spoke to him and his wife, Rosio They would like to move forward with this offer and know to expect a phone call " from the oncoming coordinator with updated contact information this morning, and that we will update them with admission timing and next steps as soon as the donor OR is set. Please call Rosio's cell phone [phone] first.   - - -   COVID Screening  In the past month, have you:  Or anyone close to you had a positive COVID test or suspected to have COVID: No  Had any COVID symptoms (Fever, Cough, Short of Breath, Loss of Taste/Smell, Rash): No    Unit: Spoke with Inessa at 0500; she has a bed available and will hold it for him call back with ETA  Add Organ: added @ 0511    3/4/2024 0700-Report from Brianda FLEMING Took over case  Fannie WildLing, RT    3/4/2024 0745-Called Rowan retana RN. She is aware no timing set yet. She is holding bed pending patient admission.    3/4/2024 0750-Called patient's wife Rosio and provided my contact number. She is aware donor OR time not set. I will call when timing has been set with admission instructions.    3/4/2024 0825-Dr Martel spoke with Dr Cortez. He would like Donor OR 3/5/2024 0200 and recipient OR 3/5/2024 0600. He would like patient admitted.    3/4/2024 0830-Called Saad and Luke at [facility]. Told them our preference for OR time. Saad will call when OR time set.     3/4/2024 0835-Called patient's wife Rosio. She was told patient should come in for admission. She will get him ready and will text me when they are heading to hospital.     3/4/2024 0950-Saad [employer] is still working on OR time. He thinks 3/5 0200 would work, but he will call me when time is set. Alecia OR control desk given heads up that I will be booking OR for tomorrow am, but don't have official time yet. Will confirm once donor OR time set.     3/4/2024 1125-[facility] asking if OR time 3/5 0400 would work. Check with Dr Cortez and Dr Martel. Dr Cortez would like OR earlier, but can make 0400 work. Saad informed we would prefer earlier if possible. He will check with  teams and let me know.     3/4/2024 1200-Saad Harjeeteduardo said 3/5 0400 was the best time he could do and he booked it. Dr Cortez and Dr Martel informed of OR time 3/5 0400. Per Dr Cortez, recipient OR to be booked for 3/5 0800. Alecia OR sean is at lunch. Will call at 1300 when back.    3/4/2024 1215-Rowan 7A charge RN informed of recipient OR time 3/5 0800.    3/5/2024 1305-OR time 3/5 0800 booked with [facility] OR control desk.     Admissions: 3/4/2024 0841 Celsa-informed 7A holding bed  Update Provider Entering Orders (XM Plan & COVID Testing):  3/4/2024 0845 Franchesca will place orders  Immunology: 3/4/2024 [place]  Inpatient Lab (COVID Testing [phone], Option 2): will order STAT-Franchesca will order stat  Book OR: 3/4/2024 1305 [place]  Vessel Storage Confirmation (PA/KP/LI): OK to bank-confirmed with Mig[facility] OR control desk   Blood Bank: 3/4/2024 1228 Saad  [employer]/ABO Verification: 3/4/2024 1215 printed. Migdalia confirmed received.     Donor OR Time: 3/5/2024 0400  Procuring MD: Dr Mcmanus/Shannan Amaya intern  Contact in the OR: Nikki [phone]  Organs Being Procured: Heart, Lungs, Liver, Kidneys  Expected Delays: No  Flush Solution: UW  Biopsy: No  Pump: No  Special Requests (Special blood tubes, nodes, waivers-XM and/or anatomical):  No  MD for Visualization: Dr Cortez  Transportation Details: 3/5/2024 1800: Saad Chery called with transportation info. Pickup time for Dr Mcmanus is 3/5 at 0215 [facility]. Dr Mcmanus was informed of pickup time.     3/5/2024 0318 Nikkinathalia Chery called-Lung team delayed due to weather. OR being delayed until 0500. Dr Mcmanus informed of delay. He said he will inform Dr Cortez and Dr Martel.     3/5/2024 0615-Spoke with OR control desk and Dr Martel. OR to be pushed back to 0900 per Dr Martel. OR desk aware.    Fannie Dubon, RT  Transplant coordinator             Attestation I have discussed all of the above with the Patient/Legal Guardian/Caregiver  regarding this organ offer.: Yes  Coordinator/Fellow/Attending name: Brianda Rizvi RN

## 2024-03-05 NOTE — OP NOTE
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  Liver transplant  #2.  Adhesional lysis lasting for greater than 60 minutes     Surgeon(s) and Role:     * Ryder Cortez MD - Primary     * Alex Mcmanus MD - Assisting     * Estiven Martel MD - Fellow - Assisting    Dr. Estiven Martel assisted for the entire procedure.  There was no qualified resident to assist with this procedure     General    Specimen:  Explant liver, ascites for culture.    Drains:  1 TIMOTHY drain    Urine output:  Few mL mls    Estimated blood loss:  8 units PRBC transfused    Fluids administered:       Intraoperative Events: None    Complications: None    Findings:   #1.  Severe intra-abdominal adhesions from a previous cholecystectomy with very severe portal hypertension  #2.  Micronodular cirrhotic liver  #3.  Close to 6 L of ascites  #4.  The left hepatic artery of the recipient was arising from the celiac axis.  The right hepatic artery was arising from the superior mesenteric artery and was about 6 mm.    Brief description of the procedure:  Orthotopic liver transplant, caval replacement, portal vein end-to-end anastomosis, donor celiac axis anastomosed to the recipient right hepatic artery from SMA, bile duct end-to-end anastomosis over a single-J stent.    Indication: Mary Lopez with a history of End Stage Liver Disease due to Laennec's who presents for Donation after Brain Death Whole Liver transplant. A suitable donor offer has become available. After discussing the risks and benefits of proceeding, the patient agreed to proceed with surgery and provided informed consent.    Final ABO/Crossmatch verification: After the donor organ arrived to the operating room and prior to anastomosis, I participated in the transplant pre-verification upon organ  receipt timeout by visually verifying the donor ID, organ and laterality, donor blood type, recipient unique identifier, recipient blood type, and that the donor and recipient are blood type compatible.     Donor UNOS ID:  [id number]    Donor ABO:  O    Donor arterial clamp on:  3/5/2024  7:18 AM    Preservation fluid:  UW    Vessels with organ:  Yes    Donor organ arrival to recipient room:  3/5/2024 10:09 AM    Total ischemic time:  326    Cold ischemic time:  326    Warm ischemic time:  59    Ex-vivo:  No             Back Table Preparation:   Procedure:  Bench preparation of the liver allograft for transplantation    Preoperative diagnosis:  End Stage Liver Disease due to Laennec's    Postoperative diagnosis:  Same,     Surgeon:  Surgeon(s) and Role:     * Ryder Cortez MD - Primary     * Alex Mcmanus MD - Assisting     * Estiven Martel MD - Fellow - Assisting    Co-Surgeon:  Ryder Cortez M.D.    Fellow/Assistant:  Dr. Mcmanus  performed the entire procedure.  There was no qualified resident to assist with this procedure    Anesthesia:  None    Graft biopsy:  Yes-showed less than 5% macrosteatosis    Macroscopic steatosis:  None    Back table reconstruction:  No    Intimal flap repair:  No    # of hepatic arteries:  1    # of portal veins:  1    Accessory arteries:  0    # of hepatic veins:  3    # of bile ducts:  1    Graft weight:  Small to medium liver     Findings:   Liver Laceration: No  Overall quality of liver: Excellent    Back Table Procedure: The liver allograft was received and inspected and the aforementioned findings were noted. It had been previously flushed with UW. The donor liver was placed in fresh ice-cold preservation solution. We identified the inferior vena cava. Two stay sutures were placed on the supra-hepatic portion of the cava. Two stay sutures were placed on the infra-hepatic portion of the cava. The fibro-fatty tissue and adrenal gland was cleared of inferior vena  cava. The phrenic vein was ligated. The adrenal vein was ligated. The IVC was tested for leaks by using a bulb syringe. We suture ligated all identified leaks. The portal vein was identified. All the fibro-fatty area or tissue around the portal vein was removed and the portal vein was dissected up to its bifurcation. An 8-Macedonian cannula was placed in the portal vein and fixed with a stitch. The portal vein was tested for leaks. We suture ligated all identified leaks. The cannula was left in place to be used for flushing the liver at the time of implantation. The hepatic artery anatomy was identified. The celiac axis  was traced all the way from the aortic patch to the level of the gastro-duodenal artery. Dissection was stopped at the level of the gastro-duodenal artery. All the leaks in the hepatic artery tributaries were suture ligated. The bile duct was inspected and flushed. No reconstruction was required. The liver was placed back in ice-cold preservation solution until ready for transplantation. Faculty was present for the critical portions of the procedure.    Findings:    Operative Procedure:   Arterial anastomosis start:  3/5/2024 11:45 AM    Recipient arterial unclamp:  3/5/2024  1:20 PM    Extra vessels used:  No    Extra vessels banked:  Yes    Previous upper abd surgery:  Yes    Previous cholecystectomy?  Yes    Portal vein:  Thrombus: No   Patent: Yes-severe portal hypertension   Specify:     On portal bypass:  Yes-fluids for greater than 1 L    Arterial flow:  Sufficient: Yes-the donor celiac axis was anastomosed to the recipient hepatic artery right arising from the SMA.    Bile duct anastomosis:  To bowel: No   Specify:    To duct: Yes-the donor bile duct was about 5 mm the recipient bile duct was 10 mm we performed an end-to-end anastomosis using posterior interrupted 5-0 PDS and anterior continuous 5-0 PDS over a single-J stent.   Specify:      Mary Lopez was brought to the operating  room, placed in a supine position, and a time out was performed. Sequential compression devices were placed on both lower extremities and general endotracheal anesthesia was induced. The patient was given IV antibiotics, and Solumedrol. A Moore catheter was placed. A central line was placed by Anesthesia service. The abdomen was then shaved, prepped, and draped in the usual sterile fashion.  The backtable preparation occurred prior to implantation.    We proceeded to mobilization of the liver first by dividing falciform ligament, lysis of adhesion took an additional greater than 60 minutes of operating time.  Next dividing the triangular ligaments and mobilizing the left lobe with further evaluation of the right lobe of the liver. Next the right lobe of the liver was mobilized. We elected to proceed with a hilar dissection. The right and left hepatic arteries were identified, ligated and divided, followed by ligation and division of the common bile duct. The portal vein was cleared from tissue and small branches were tied off and divided. The left and right portal veins were separately ligated and the portal vein was divided. At this point we went on the bypass with bypass flow of 1 liter per minute. Next, we continued mobilizing the right lobe. The adhesions between the right lobe and the right diaphragm were divided and the lobe was mobilized up to the vena cava. The hepatic veins were identified. Next, we dissected out the caudate lobe and infrahepatic IVC.     We applied Infrahepatic and suprahepatic clamps to the IVC and the liver was excised with curved Castorena scissors. The recipient's liver was passed off from the operating table. Next, complete hemostasis was obtained. The donor liver was brought into the field. The suprahepatic IVC anastomosis was constructed with 3-0 Prolene followed by the construction of infrahepatic anastomosis with 4-0 Prolene. Next, we came off the bypass and end-to-end portal  anastomosis was constructed between the donor and recipient portal veins using 6-0 Prolene. During the construction of the infrahepatic IVC anastomosis, the liver was flushed with 5% albumin. Once the portal anastomosis was constructed, the liver was reperfused. Complete hemostasis was obtained and arterial anastomosis was performed between the donor celiac trunk patch using Morgan technique and the bifurcation of the right and left recipient hepatic arteries. After clamps were released, there was a good flow within the donor artery. Again, all the arterial branches that were bleeding were ligated and complete hemostasis was obtained. After the anastomosis was performed, good flow was re-established, hemostasis was obtained. Next, the biliary anastomosis was performed using a 5-0 running PDS suture over the stent.     Next again the abdomen was irrigated and hemostasis was obtained. We closed the abdomen with #1 PDS in 2 layers.. All needle, sponge and instrument counts were correct x 2. Faculty was present for key portions of the procedure. The patient was awakened, extubated, and transferred to the ICU for post-op monitoring.

## 2024-03-05 NOTE — ANESTHESIA CARE TRANSFER NOTE
Patient: Mary Lopez    Procedure: Procedure(s):  Transplant liver recipient  donor, bile duct stent placement  Bench liver       Diagnosis: Alcoholic cirrhosis of liver (H) [K70.30]  Diagnosis Additional Information: No value filed.    Anesthesia Type:   General     Note:    Oropharynx: oropharynx clear of all foreign objects  Level of Consciousness: awake  Oxygen Supplementation: face mask  Level of Supplemental Oxygen (L/min / FiO2): 8  Independent Airway: airway patency satisfactory and stable  Dentition: dentition unchanged  Vital Signs Stable: post-procedure vital signs reviewed and stable  Report to RN Given: handoff report given  Patient transferred to: ICU  Comments: See ICU flowsheet for VS  ICU Handoff: Call for PAUSE to initiate/utilize ICU HANDOFF, Identified Patient, Identified Responsible Provider, Reviewed the Pertinent Medical History, Discussed Surgical Course, Reviewed Intra-OP Anesthesia Management and Issues during Anesthesia, Set Expectations for Post Procedure Period and Allowed Opportunity for Questions and Acknowledgement of Understanding      Vitals:  Vitals Value Taken Time   BP     Temp     Pulse     Resp     SpO2         Electronically Signed By: LESA Saldivar CRNA  2024  3:49 PM

## 2024-03-05 NOTE — ANESTHESIA PROCEDURE NOTES
Central Line/PA Catheter Placement    Pre-Procedure   Staff -        Anesthesiologist:  Doyle Cortes MD       Performed By: anesthesiologist       Location: OR       Pre-Anesthestic Checklist: patient identified, IV checked, site marked, risks and benefits discussed, informed consent, monitors and equipment checked, pre-op evaluation and at physician/surgeon's request  Timeout:       Correct Patient: Yes        Correct Procedure: Yes        Correct Site: Yes        Correct Position: Yes        Correct Laterality: Yes   Line Placement:   This line was placed Post Induction    Procedure   Procedure: central line       Laterality: right       Insertion Site: internal jugular.       Patient Position: Trendelenburg  Sterile Prep        All elements of maximal sterile barrier technique followed       Patient Prep/Sterile Barriers: draped, hand hygiene, gloves , hat , mask , draped, gown, sterile gel and probe cover       Skin prep: Chloraprep  Insertion/Injection        Technique: ultrasound guided        1. Ultrasound was used to evaluate the access site.       2. Vein evaluated via ultrasound for patency/adequacy.       3. Using real-time ultrasound the needle/catheter was observed entering the artery/vein.       Type: PA/CVC with Introducer  Narrative         Secured by: suture       Tegaderm and Biopatch dressing used.       Complications: None apparent,        blood aspirated from all lumens,

## 2024-03-05 NOTE — OR NURSING
Pre-Op Send RN  Start Time: 0710  Complete Time:0727  Mode of Transportation to 3C:     Outstanding Issues / Concerns:   [ ] Consent / Marking Completed   [ ] If not why not completed?  Nurse is checking to make sure consent complete  [ ] Glucose (w/in 2 hours)  On insulin gtt last glucose 110 repeat q 2hrs per policy   [ ] Repeat Vitals   /50's gtts ordered nurse seeing if they want to start now or in OR   [ ] Antibiotics  Ordered for pre op one due also at 14 and vanco due at 2300  [ ] Meds Given in last 4 hours  [ ] Implanted Devices (Device RN Contacted) no implanted   [ ] Drains / Tubes / Skin Issues  2 PIV  [ ] Orders to follow-up on   [ ] Hand-off Given To ______________ RN @ _________ (Time)

## 2024-03-05 NOTE — ANESTHESIA PROCEDURE NOTES
Arterial Line Procedure Note    Pre-Procedure   Staff -        Anesthesiologist:  Doyle Cortes MD       Performed By: anesthesiologist       Location: OR       Pre-Anesthestic Checklist: patient identified, IV checked, risks and benefits discussed, informed consent, monitors and equipment checked, pre-op evaluation and at physician/surgeon's request  Timeout:       Correct Patient: Yes        Correct Procedure: Yes        Correct Site: Yes        Correct Position: Yes   Line Placement:   This line was placed Post Induction  Procedure   Procedure: arterial line       Laterality: left       Insertion Site: brachial.  Sterile Prep        Standard elements of sterile barrier followed       Skin prep: Chloraprep  Insertion/Injection        Technique: ultrasound guided        1. Ultrasound was used to evaluate the access site.       2. Artery evaluated via ultrasound for patency/adequacy.       3. Using real-time ultrasound the needle/catheter was observed entering the artery/vein.       Catheter Type/Size: 20 G, 12 cm  Narrative         Secured by: suture and anchor securement device       Tegaderm dressing used.       Complications: None apparent,        Arterial waveform: Yes

## 2024-03-05 NOTE — PHARMACY-VANCOMYCIN DOSING SERVICE
"Pharmacy Vancomycin Initial Note  Date of Service 2024  Patient's  1970  53 year old, male    Indication: Surgical Prophylaxis (previous bacteremia)    Current estimated CrCl = Estimated Creatinine Clearance: 115.5 mL/min (based on SCr of 0.89 mg/dL).    Creatinine for last 3 days  3/4/2024: 10:58 AM Creatinine 1.08 mg/dL  3/5/2024:  4:09 PM Creatinine 0.89 mg/dL    Recent Vancomycin Level(s) for last 3 days  No results found for requested labs within last 3 days.      Vancomycin IV Administrations (past 72 hours)                     vancomycin (VANCOCIN) 1,250 mg in 0.9% NaCl 250 mL intermittent infusion (mg) 1,250 mg Given 24 1054     1,250 mg New Bag 24 2304     1,250 mg New Bag  1246                    Nephrotoxins and other renal medications (From now, onward)      Start     Dose/Rate Route Frequency Ordered Stop    24 1930  piperacillin-tazobactam (ZOSYN) 3.375 g vial to attach to  mL bag        Note to Pharmacy: For [facility], [facility] and [facility]: For Zosyn-naive patients, use the \"Zosyn initial dose + extended infusion\" order panel.    3.375 g  over 30 Minutes Intravenous EVERY 6 HOURS 24 1529 03/10/24 1929    24 1800  tacrolimus (GENERIC) capsule 2 mg        See Hyperspace for full Linked Orders Report.    2 mg Oral 2 TIMES DAILY. 24 1529      24 1800  tacrolimus (GENERIC) suspension 2 mg        See Hyperspace for full Linked Orders Report.    2 mg Oral or NG Tube 2 TIMES DAILY. 24 1529      24 1100  vancomycin (VANCOCIN) 1,250 mg in 0.9% NaCl 250 mL intermittent infusion         1,250 mg  over 90 Minutes Intravenous EVERY 12 HOURS 24 1037              Contrast Orders - past 72 hours (72h ago, onward)      None                Plan:  Patient was started on IV vancomycin 1250 mg IV q12h yesterday, 3/4, for perioperative pharmacoprophylaxis and bacteremia. Will continue this dose for now.    Autumn Nesbitt, [facility]   "

## 2024-03-05 NOTE — PLAN OF CARE
"VS: /56 (BP Location: Left arm, Patient Position: Semi-Bob's, Cuff Size: Adult Regular)   Pulse 89   Temp 97.7  F (36.5  C) (Oral)   Resp 20   Ht 1.73 m (5' 8.11\")   Wt 107.2 kg (236 lb 4.8 oz)   SpO2 96%   BMI 35.81 kg/m      Cares: 1500 - 2330     Neuro: Aox4   VS: VSS   Cardiac: softer pressures 100/50s   Respiratory: dyspnea on exertion, on RA.   GI/: voiding adequately w/out issues, LBM 3/4  Skin: scattered bruising on abdomen, bruising on right chest/neck, bruise on back of left thigh.   Diet: NPO since 1800   Labs: ma.1 - finished up with IV mag replacement this shift  BG: Q1H on insulin gtt (99 - 470 currently on alg 4)  LDA: left PIV SL, right PIV - infusing insulin gtt w/ TKO   Mobility: SBA   Pain/Nausea: denies pain or nausea   PRN medications: none given   Plan of Care: tentative OR time at 6am. Continue with current POC and update MD with any changes.   "

## 2024-03-05 NOTE — ANESTHESIA PROCEDURE NOTES
Central Line/PA Catheter Placement    Pre-Procedure   Staff -        Anesthesiologist:  Doyle Cortes MD       Performed By: anesthesiologist       Location: OR       Pre-Anesthestic Checklist: patient identified, IV checked, site marked, risks and benefits discussed, informed consent, monitors and equipment checked, pre-op evaluation and at physician/surgeon's request  Timeout:       Correct Patient: Yes        Correct Procedure: Yes        Correct Site: Yes        Correct Position: Yes        Correct Laterality: Yes   Line Placement:   This line was placed Post Induction    Procedure   Procedure: central line       Laterality: right       Insertion Site: internal jugular.       Patient Position: Trendelenburg  Sterile Prep        All elements of maximal sterile barrier technique followed       Patient Prep/Sterile Barriers: draped, hand hygiene, gloves , hat , mask , draped, gown, sterile gel and probe cover       Skin prep: Chloraprep  Insertion/Injection        Technique: ultrasound guided        1. Ultrasound was used to evaluate the access site.       2. Vein evaluated via ultrasound for patency/adequacy.       3. Using real-time ultrasound the needle/catheter was observed entering the artery/vein.       Type: PA/CVC with Introducer  Narrative         Secured by: suture and anchor securement device       Tegaderm dressing used.       Complications: None apparent,

## 2024-03-05 NOTE — BRIEF OP NOTE
[facility] Brief Operative Note    Pre-operative diagnosis: Alcoholic cirrhosis of liver (H) [K70.30]   Post-operative diagnosis same   Procedure: Procedure(s):  Transplant liver recipient  donor   Surgeon(s): Surgeon(s) and Role:     * Ryder Cortez MD - Primary     * Alex Mcmanus MD - Assisting     * Estiven Martel MD - Fellow - Assisting   Estimated blood loss: 3000 mL    Specimens: ID Type Source Tests Collected by Time Destination   1 : Donor Gallbladder Tissue Gallbladder SURGICAL PATHOLOGY EXAM Ryder Cortez MD 3/5/2024  1:30 PM    A : Ascites Peritoneal Fluid Abdomen ANAEROBIC BACTERIAL CULTURE ROUTINE, GRAM STAIN, FUNGAL OR YEAST CULTURE ROUTINE, AEROBIC BACTERIAL CULTURE ROUTINE Ryder Cortez MD 3/5/2024 10:22 AM       Findings: DDLT. EBL 3L . Biliary stent placed. Donor liver with standard anatomy.

## 2024-03-05 NOTE — PROGRESS NOTES
[facility]  Procedure Note           Intubation:       Mary Lopez  MRN# [id number]   March 5, 2024, 5:13 PM Indication: Inability to protect airway           Patient intubated at: March 5, 2024, 5:14 PM   Sedative medication: Was administered during the procedure   Technique used: Glidescope   Endotracheal tube size: 8.0 cm with cuff   Number of attempts: 1   Placement confirmed by: Auscultation of bilateral breath sounds  Visualization of bilateral chest wall rise  End-tidal CO2 monitor   Tube secured at: 26 cm      This procedure was performed without difficulty and he tolerated the procedure well with no complications.      Recorded by Will Solis, RT

## 2024-03-05 NOTE — PROGRESS NOTES
"BP 98/47   Pulse (P) 85   Temp (P) 98.8  F (37.1  C) (Oral)   Resp (P) 14   Ht 1.73 m (5' 8.11\")   Wt 107.2 kg (236 lb 4.8 oz)   SpO2 94%   BMI 35.81 kg/m      Pt transferred to OR for liver transplant. Soft BP, OVSS on RA. NPO. On insulin gtt, algorithm 3. Last , 3 units infusing. Blood completed prior to transfer.   "

## 2024-03-05 NOTE — PLAN OF CARE
"Goal Outcome Evaluation:       BP 98/47   Pulse 81   Temp 98.7  F (37.1  C) (Oral)   Resp 18   Ht 1.73 m (5' 8.11\")   Wt 107.2 kg (236 lb 4.8 oz)   SpO2 94%   BMI 35.81 kg/m      Shift: 0465-2999    VS: Hypotensive 80s systolic  Neuro: Aox4  Cardio: Hypotensive  Respiratory: WDL on RA  GI: LBM 3/4  : Voiding well  Skin: scattered bruising  Diet: NPO    Labs: Hgb 6.8, Lactic 1.9  BG: q1hr, insulin gtt Alg 3, ranged ()  LDA:  L and R PIV  Infusions: insulin gtt Alg 3, Albumin given  Mobility: SBA  Pain/Nausea: denies  PRN medications: none given                 "

## 2024-03-05 NOTE — ANESTHESIA PROCEDURE NOTES
Airway       Patient location during procedure: OR       Procedure Start/Stop Times: 3/5/2024 9:13 AM  Staff -        Other Anesthesia Staff: Ora Solo       Performed By: SRNA  Consent for Airway        Urgency: elective  Indications and Patient Condition       Indications for airway management: doreen-procedural       Induction type:intravenous       Mask difficulty assessment: 1 - vent by mask    Final Airway Details       Final airway type: endotracheal airway       Successful airway: ETT - single and Oral  Endotracheal Airway Details        ETT size (mm): 7.5       Cuffed: yes       Successful intubation technique: direct laryngoscopy       DL Blade Type: Saenz 2       Grade View of Cords: 1       Adjucts: stylet       Position: Left       Measured from: gums/teeth       Secured at (cm): 22       Bite block used: None    Post intubation assessment        Placement verified by: capnometry, equal breath sounds and chest rise        Number of attempts at approach: 1       Number of other approaches attempted: 0       Secured with: tape       Ease of procedure: easy       Dentition: Intact and Unchanged    Medication(s) Administered   Medication Administration Time: 3/5/2024 9:13 AM

## 2024-03-05 NOTE — PROGRESS NOTES
I met the patient and explained to him the risk benefits and alternatives of liver transplantation.  The complications we discussed included but were not limited to intraoperative death, postoperative death, vascular complications, bleeding, biliary complications and infection.  The patient and his family understand the risk and would like to proceed to transplantation.

## 2024-03-06 ENCOUNTER — APPOINTMENT (OUTPATIENT)
Dept: GENERAL RADIOLOGY | Facility: CLINIC | Age: 54
DRG: 005 | End: 2024-03-06
Attending: PHYSICIAN ASSISTANT
Payer: COMMERCIAL

## 2024-03-06 ENCOUNTER — APPOINTMENT (OUTPATIENT)
Dept: ULTRASOUND IMAGING | Facility: CLINIC | Age: 54
DRG: 005 | End: 2024-03-06
Payer: COMMERCIAL

## 2024-03-06 ENCOUNTER — APPOINTMENT (OUTPATIENT)
Dept: GENERAL RADIOLOGY | Facility: CLINIC | Age: 54
DRG: 005 | End: 2024-03-06
Attending: TRANSPLANT SURGERY
Payer: COMMERCIAL

## 2024-03-06 ENCOUNTER — APPOINTMENT (OUTPATIENT)
Dept: CT IMAGING | Facility: CLINIC | Age: 54
DRG: 005 | End: 2024-03-06
Payer: COMMERCIAL

## 2024-03-06 ENCOUNTER — APPOINTMENT (OUTPATIENT)
Dept: ULTRASOUND IMAGING | Facility: CLINIC | Age: 54
DRG: 005 | End: 2024-03-06
Attending: TRANSPLANT SURGERY
Payer: COMMERCIAL

## 2024-03-06 DIAGNOSIS — Z13.220 LIPID SCREENING: ICD-10-CM

## 2024-03-06 DIAGNOSIS — Z94.4 LIVER REPLACED BY TRANSPLANT (H): Primary | ICD-10-CM

## 2024-03-06 DIAGNOSIS — K70.11 ALCOHOLIC HEPATITIS WITH ASCITES (H): Chronic | ICD-10-CM

## 2024-03-06 LAB
ALBUMIN SERPL BCG-MCNC: 2.3 G/DL (ref 3.5–5.2)
ALBUMIN SERPL BCG-MCNC: 2.4 G/DL (ref 3.5–5.2)
ALLEN'S TEST: ABNORMAL
ALP SERPL-CCNC: 40 U/L (ref 40–150)
ALP SERPL-CCNC: 47 U/L (ref 40–150)
ALT SERPL W P-5'-P-CCNC: 183 U/L (ref 0–70)
ALT SERPL W P-5'-P-CCNC: 294 U/L (ref 0–70)
AMYLASE SERPL-CCNC: 22 U/L (ref 28–100)
ANION GAP SERPL CALCULATED.3IONS-SCNC: 10 MMOL/L (ref 7–15)
ANION GAP SERPL CALCULATED.3IONS-SCNC: 10 MMOL/L (ref 7–15)
ANION GAP SERPL CALCULATED.3IONS-SCNC: 7 MMOL/L (ref 7–15)
ANION GAP SERPL CALCULATED.3IONS-SCNC: 8 MMOL/L (ref 7–15)
APTT PPP: 27 SECONDS (ref 22–38)
APTT PPP: 30 SECONDS (ref 22–38)
AST SERPL W P-5'-P-CCNC: 156 U/L (ref 0–45)
AST SERPL W P-5'-P-CCNC: 310 U/L (ref 0–45)
BASE EXCESS BLDA CALC-SCNC: -1.1 MMOL/L (ref -3–3)
BASE EXCESS BLDA CALC-SCNC: 1.5 MMOL/L (ref -3–3)
BASE EXCESS BLDA CALC-SCNC: 2 MMOL/L (ref -3–3)
BASE EXCESS BLDA CALC-SCNC: 2.4 MMOL/L (ref -3–3)
BASE EXCESS BLDA CALC-SCNC: 2.4 MMOL/L (ref -3–3)
BASOPHILS # BLD AUTO: 0 10E3/UL (ref 0–0.2)
BASOPHILS # BLD AUTO: 0 10E3/UL (ref 0–0.2)
BASOPHILS NFR BLD AUTO: 0 %
BASOPHILS NFR BLD AUTO: 0 %
BILIRUB DIRECT SERPL-MCNC: 1.74 MG/DL (ref 0–0.3)
BILIRUB SERPL-MCNC: 1.6 MG/DL
BILIRUB SERPL-MCNC: 2.6 MG/DL
BLD PROD TYP BPU: NORMAL
BLOOD COMPONENT TYPE: NORMAL
BUN SERPL-MCNC: 23.6 MG/DL (ref 6–20)
BUN SERPL-MCNC: 26.6 MG/DL (ref 6–20)
BUN SERPL-MCNC: 29.2 MG/DL (ref 6–20)
BUN SERPL-MCNC: 31.8 MG/DL (ref 6–20)
CA-I BLD-MCNC: 5 MG/DL (ref 4.4–5.2)
CA-I BLD-MCNC: 5.1 MG/DL (ref 4.4–5.2)
CA-I BLD-MCNC: 5.2 MG/DL (ref 4.4–5.2)
CA-I BLD-MCNC: ABNORMAL MG/DL
CALCIUM SERPL-MCNC: 8.8 MG/DL (ref 8.6–10)
CALCIUM SERPL-MCNC: 8.8 MG/DL (ref 8.6–10)
CALCIUM SERPL-MCNC: 8.9 MG/DL (ref 8.6–10)
CALCIUM SERPL-MCNC: 9 MG/DL (ref 8.6–10)
CHLORIDE SERPL-SCNC: 103 MMOL/L (ref 98–107)
CHLORIDE SERPL-SCNC: 103 MMOL/L (ref 98–107)
CHLORIDE SERPL-SCNC: 104 MMOL/L (ref 98–107)
CHLORIDE SERPL-SCNC: 104 MMOL/L (ref 98–107)
CODING SYSTEM: NORMAL
COHGB MFR BLD: 93.8 % (ref 96–97)
COHGB MFR BLD: 96.3 % (ref 96–97)
COHGB MFR BLD: 97.3 % (ref 96–97)
COHGB MFR BLD: >100 % (ref 96–97)
CREAT SERPL-MCNC: 1.11 MG/DL (ref 0.67–1.17)
CREAT SERPL-MCNC: 1.44 MG/DL (ref 0.67–1.17)
CREAT SERPL-MCNC: 1.45 MG/DL (ref 0.67–1.17)
CREAT SERPL-MCNC: 1.65 MG/DL (ref 0.67–1.17)
CREAT SERPL-MCNC: 1.81 MG/DL (ref 0.67–1.17)
CROSSMATCH: NORMAL
DEPRECATED HCO3 PLAS-SCNC: 24 MMOL/L (ref 22–29)
DEPRECATED HCO3 PLAS-SCNC: 24 MMOL/L (ref 22–29)
DEPRECATED HCO3 PLAS-SCNC: 25 MMOL/L (ref 22–29)
DEPRECATED HCO3 PLAS-SCNC: 25 MMOL/L (ref 22–29)
EGFRCR SERPLBLD CKD-EPI 2021: 44 ML/MIN/1.73M2
EGFRCR SERPLBLD CKD-EPI 2021: 50 ML/MIN/1.73M2
EGFRCR SERPLBLD CKD-EPI 2021: 58 ML/MIN/1.73M2
EGFRCR SERPLBLD CKD-EPI 2021: 58 ML/MIN/1.73M2
EGFRCR SERPLBLD CKD-EPI 2021: 80 ML/MIN/1.73M2
EOSINOPHIL # BLD AUTO: 0 10E3/UL (ref 0–0.7)
EOSINOPHIL # BLD AUTO: 0 10E3/UL (ref 0–0.7)
EOSINOPHIL NFR BLD AUTO: 0 %
EOSINOPHIL NFR BLD AUTO: 0 %
ERYTHROCYTE [DISTWIDTH] IN BLOOD BY AUTOMATED COUNT: 15.1 % (ref 10–15)
ERYTHROCYTE [DISTWIDTH] IN BLOOD BY AUTOMATED COUNT: 15.3 % (ref 10–15)
ERYTHROCYTE [DISTWIDTH] IN BLOOD BY AUTOMATED COUNT: 15.4 % (ref 10–15)
ERYTHROCYTE [DISTWIDTH] IN BLOOD BY AUTOMATED COUNT: 15.7 % (ref 10–15)
ERYTHROCYTE [DISTWIDTH] IN BLOOD BY AUTOMATED COUNT: 16 % (ref 10–15)
ERYTHROCYTE [DISTWIDTH] IN BLOOD BY AUTOMATED COUNT: 16.6 % (ref 10–15)
FIBRINOGEN PPP-MCNC: 152 MG/DL (ref 170–490)
FIBRINOGEN PPP-MCNC: 164 MG/DL (ref 170–490)
FIBRINOGEN PPP-MCNC: 176 MG/DL (ref 170–490)
FIBRINOGEN PPP-MCNC: 190 MG/DL (ref 170–490)
FIBRINOGEN PPP-MCNC: 225 MG/DL (ref 170–490)
FIBRINOGEN PPP-MCNC: 245 MG/DL (ref 170–490)
FIBRINOGEN PPP-MCNC: 261 MG/DL (ref 170–490)
GLUCOSE BLD-MCNC: 239 MG/DL (ref 70–99)
GLUCOSE BLDC GLUCOMTR-MCNC: 116 MG/DL (ref 70–99)
GLUCOSE BLDC GLUCOMTR-MCNC: 122 MG/DL (ref 70–99)
GLUCOSE BLDC GLUCOMTR-MCNC: 124 MG/DL (ref 70–99)
GLUCOSE BLDC GLUCOMTR-MCNC: 127 MG/DL (ref 70–99)
GLUCOSE BLDC GLUCOMTR-MCNC: 128 MG/DL (ref 70–99)
GLUCOSE BLDC GLUCOMTR-MCNC: 135 MG/DL (ref 70–99)
GLUCOSE BLDC GLUCOMTR-MCNC: 135 MG/DL (ref 70–99)
GLUCOSE BLDC GLUCOMTR-MCNC: 137 MG/DL (ref 70–99)
GLUCOSE BLDC GLUCOMTR-MCNC: 140 MG/DL (ref 70–99)
GLUCOSE BLDC GLUCOMTR-MCNC: 144 MG/DL (ref 70–99)
GLUCOSE BLDC GLUCOMTR-MCNC: 144 MG/DL (ref 70–99)
GLUCOSE BLDC GLUCOMTR-MCNC: 147 MG/DL (ref 70–99)
GLUCOSE BLDC GLUCOMTR-MCNC: 156 MG/DL (ref 70–99)
GLUCOSE BLDC GLUCOMTR-MCNC: 176 MG/DL (ref 70–99)
GLUCOSE BLDC GLUCOMTR-MCNC: 178 MG/DL (ref 70–99)
GLUCOSE BLDC GLUCOMTR-MCNC: 180 MG/DL (ref 70–99)
GLUCOSE BLDC GLUCOMTR-MCNC: 205 MG/DL (ref 70–99)
GLUCOSE SERPL-MCNC: 133 MG/DL (ref 70–99)
GLUCOSE SERPL-MCNC: 136 MG/DL (ref 70–99)
GLUCOSE SERPL-MCNC: 143 MG/DL (ref 70–99)
GLUCOSE SERPL-MCNC: 154 MG/DL (ref 70–99)
HCO3 BLD-SCNC: 26 MMOL/L (ref 21–28)
HCO3 BLDA-SCNC: 25 MMOL/L (ref 21–28)
HCT VFR BLD AUTO: 16.9 % (ref 40–53)
HCT VFR BLD AUTO: 20.5 % (ref 40–53)
HCT VFR BLD AUTO: 21.6 % (ref 40–53)
HCT VFR BLD AUTO: 22.3 % (ref 40–53)
HCT VFR BLD AUTO: 22.6 % (ref 40–53)
HCT VFR BLD AUTO: 24.2 % (ref 40–53)
HGB BLD-MCNC: 10.3 G/DL (ref 13.3–17.7)
HGB BLD-MCNC: 6.2 G/DL (ref 13.3–17.7)
HGB BLD-MCNC: 7.3 G/DL (ref 13.3–17.7)
HGB BLD-MCNC: 7.5 G/DL (ref 13.3–17.7)
HGB BLD-MCNC: 7.7 G/DL (ref 13.3–17.7)
HGB BLD-MCNC: 7.8 G/DL (ref 13.3–17.7)
HGB BLD-MCNC: 8.3 G/DL (ref 13.3–17.7)
HGB BLD-MCNC: 8.7 G/DL (ref 13.3–17.7)
IMM GRANULOCYTES # BLD: 0.1 10E3/UL
IMM GRANULOCYTES # BLD: 0.1 10E3/UL
IMM GRANULOCYTES NFR BLD: 1 %
IMM GRANULOCYTES NFR BLD: 1 %
INR PPP: 1.29 (ref 0.85–1.15)
INR PPP: 1.41 (ref 0.85–1.15)
INR PPP: 1.57 (ref 0.85–1.15)
INR PPP: 1.59 (ref 0.85–1.15)
INR PPP: 1.6 (ref 0.85–1.15)
INR PPP: 1.64 (ref 0.85–1.15)
ISSUE DATE AND TIME: NORMAL
LACTATE BLD-SCNC: ABNORMAL MMOL/L
LACTATE SERPL-SCNC: 1.5 MMOL/L (ref 0.7–2)
LACTATE SERPL-SCNC: 1.6 MMOL/L (ref 0.7–2)
LACTATE SERPL-SCNC: 1.6 MMOL/L (ref 0.7–2)
LIPASE SERPL-CCNC: 5 U/L (ref 13–60)
LYMPHOCYTES # BLD AUTO: 0.3 10E3/UL (ref 0.8–5.3)
LYMPHOCYTES # BLD AUTO: 0.4 10E3/UL (ref 0.8–5.3)
LYMPHOCYTES NFR BLD AUTO: 2 %
LYMPHOCYTES NFR BLD AUTO: 3 %
MAGNESIUM SERPL-MCNC: 1.6 MG/DL (ref 1.7–2.3)
MAGNESIUM SERPL-MCNC: 2 MG/DL (ref 1.7–2.3)
MAGNESIUM SERPL-MCNC: 2.2 MG/DL (ref 1.7–2.3)
MCH RBC QN AUTO: 29.3 PG (ref 26.5–33)
MCH RBC QN AUTO: 30.2 PG (ref 26.5–33)
MCH RBC QN AUTO: 30.5 PG (ref 26.5–33)
MCH RBC QN AUTO: 31.1 PG (ref 26.5–33)
MCH RBC QN AUTO: 31.2 PG (ref 26.5–33)
MCH RBC QN AUTO: 31.3 PG (ref 26.5–33)
MCHC RBC AUTO-ENTMCNC: 34.5 G/DL (ref 31.5–36.5)
MCHC RBC AUTO-ENTMCNC: 35.6 G/DL (ref 31.5–36.5)
MCHC RBC AUTO-ENTMCNC: 36 G/DL (ref 31.5–36.5)
MCHC RBC AUTO-ENTMCNC: 36.1 G/DL (ref 31.5–36.5)
MCHC RBC AUTO-ENTMCNC: 36.7 G/DL (ref 31.5–36.5)
MCHC RBC AUTO-ENTMCNC: 36.7 G/DL (ref 31.5–36.5)
MCV RBC AUTO: 84 FL (ref 78–100)
MCV RBC AUTO: 85 FL (ref 78–100)
MCV RBC AUTO: 88 FL (ref 78–100)
MONOCYTES # BLD AUTO: 0.4 10E3/UL (ref 0–1.3)
MONOCYTES # BLD AUTO: 0.7 10E3/UL (ref 0–1.3)
MONOCYTES NFR BLD AUTO: 4 %
MONOCYTES NFR BLD AUTO: 6 %
NEUTROPHILS # BLD AUTO: 10.8 10E3/UL (ref 1.6–8.3)
NEUTROPHILS # BLD AUTO: 9.7 10E3/UL (ref 1.6–8.3)
NEUTROPHILS NFR BLD AUTO: 91 %
NEUTROPHILS NFR BLD AUTO: 92 %
NRBC # BLD AUTO: 0 10E3/UL
NRBC # BLD AUTO: 0 10E3/UL
NRBC BLD AUTO-RTO: 0 /100
NRBC BLD AUTO-RTO: 0 /100
O2/TOTAL GAS SETTING VFR VENT: 40 %
O2/TOTAL GAS SETTING VFR VENT: 50 %
O2/TOTAL GAS SETTING VFR VENT: 51 %
O2/TOTAL GAS SETTING VFR VENT: 60 %
O2/TOTAL GAS SETTING VFR VENT: 60 %
PCO2 BLD: 35 MM HG (ref 35–45)
PCO2 BLD: 37 MM HG (ref 35–45)
PCO2 BLDA: 46 MM HG (ref 35–45)
PEEP: 5 CM H2O
PEEP: 7 CM H2O
PH BLD: 7.45 [PH] (ref 7.35–7.45)
PH BLD: 7.46 [PH] (ref 7.35–7.45)
PH BLD: 7.46 [PH] (ref 7.35–7.45)
PH BLD: 7.48 [PH] (ref 7.35–7.45)
PH BLDA: 7.34 [PH] (ref 7.35–7.45)
PHOSPHATE SERPL-MCNC: 3.8 MG/DL (ref 2.5–4.5)
PHOSPHATE SERPL-MCNC: 4.7 MG/DL (ref 2.5–4.5)
PLATELET # BLD AUTO: 128 10E3/UL (ref 150–450)
PLATELET # BLD AUTO: 149 10E3/UL (ref 150–450)
PLATELET # BLD AUTO: 49 10E3/UL (ref 150–450)
PLATELET # BLD AUTO: 65 10E3/UL (ref 150–450)
PLATELET # BLD AUTO: 79 10E3/UL (ref 150–450)
PLATELET # BLD AUTO: 80 10E3/UL (ref 150–450)
PO2 BLD: 136 MM HG (ref 80–105)
PO2 BLD: 57 MM HG (ref 80–105)
PO2 BLD: 66 MM HG (ref 80–105)
PO2 BLD: 76 MM HG (ref 80–105)
PO2 BLDA: 96 MM HG (ref 80–105)
POTASSIUM BLD-SCNC: 4.1 MMOL/L (ref 3.4–5.3)
POTASSIUM SERPL-SCNC: 4 MMOL/L (ref 3.4–5.3)
POTASSIUM SERPL-SCNC: 4.2 MMOL/L (ref 3.4–5.3)
PROT SERPL-MCNC: 3.6 G/DL (ref 6.4–8.3)
PROT SERPL-MCNC: 3.7 G/DL (ref 6.4–8.3)
RADIOLOGIST FLAGS: ABNORMAL
RBC # BLD AUTO: 1.98 10E6/UL (ref 4.4–5.9)
RBC # BLD AUTO: 2.34 10E6/UL (ref 4.4–5.9)
RBC # BLD AUTO: 2.58 10E6/UL (ref 4.4–5.9)
RBC # BLD AUTO: 2.63 10E6/UL (ref 4.4–5.9)
RBC # BLD AUTO: 2.67 10E6/UL (ref 4.4–5.9)
RBC # BLD AUTO: 2.85 10E6/UL (ref 4.4–5.9)
SAO2 % BLDA: 90 % (ref 92–100)
SAO2 % BLDA: 93 % (ref 92–100)
SAO2 % BLDA: 95 % (ref 92–100)
SAO2 % BLDA: 98 % (ref 92–100)
SAO2 % BLDA: 98 % (ref 96–97)
SODIUM BLD-SCNC: 132 MMOL/L (ref 135–145)
SODIUM SERPL-SCNC: 136 MMOL/L (ref 135–145)
SODIUM SERPL-SCNC: 137 MMOL/L (ref 135–145)
TACROLIMUS BLD-MCNC: 1.5 UG/L (ref 5–15)
TME LAST DOSE: ABNORMAL H
TME LAST DOSE: ABNORMAL H
UNIT ABO/RH: NORMAL
UNIT NUMBER: NORMAL
UNIT STATUS: NORMAL
UNIT TYPE ISBT: 5100
UNIT TYPE ISBT: 9500
UNIT TYPE ISBT: 9500
VANCOMYCIN SERPL-MCNC: 23 UG/ML
WBC # BLD AUTO: 10.7 10E3/UL (ref 4–11)
WBC # BLD AUTO: 11.3 10E3/UL (ref 4–11)
WBC # BLD AUTO: 11.7 10E3/UL (ref 4–11)
WBC # BLD AUTO: 12 10E3/UL (ref 4–11)
WBC # BLD AUTO: 16.1 10E3/UL (ref 4–11)
WBC # BLD AUTO: 7.9 10E3/UL (ref 4–11)

## 2024-03-06 PROCEDURE — 83605 ASSAY OF LACTIC ACID: CPT

## 2024-03-06 PROCEDURE — 85384 FIBRINOGEN ACTIVITY: CPT

## 2024-03-06 PROCEDURE — 85610 PROTHROMBIN TIME: CPT | Performed by: STUDENT IN AN ORGANIZED HEALTH CARE EDUCATION/TRAINING PROGRAM

## 2024-03-06 PROCEDURE — 71045 X-RAY EXAM CHEST 1 VIEW: CPT | Mod: 26 | Performed by: RADIOLOGY

## 2024-03-06 PROCEDURE — 250N000011 HC RX IP 250 OP 636: Mod: JZ

## 2024-03-06 PROCEDURE — 258N000003 HC RX IP 258 OP 636: Performed by: STUDENT IN AN ORGANIZED HEALTH CARE EDUCATION/TRAINING PROGRAM

## 2024-03-06 PROCEDURE — 82565 ASSAY OF CREATININE: CPT

## 2024-03-06 PROCEDURE — 84100 ASSAY OF PHOSPHORUS: CPT | Performed by: STUDENT IN AN ORGANIZED HEALTH CARE EDUCATION/TRAINING PROGRAM

## 2024-03-06 PROCEDURE — 85730 THROMBOPLASTIN TIME PARTIAL: CPT

## 2024-03-06 PROCEDURE — 82805 BLOOD GASES W/O2 SATURATION: CPT | Performed by: STUDENT IN AN ORGANIZED HEALTH CARE EDUCATION/TRAINING PROGRAM

## 2024-03-06 PROCEDURE — 83605 ASSAY OF LACTIC ACID: CPT | Performed by: STUDENT IN AN ORGANIZED HEALTH CARE EDUCATION/TRAINING PROGRAM

## 2024-03-06 PROCEDURE — 84100 ASSAY OF PHOSPHORUS: CPT

## 2024-03-06 PROCEDURE — 250N000013 HC RX MED GY IP 250 OP 250 PS 637: Performed by: TRANSPLANT SURGERY

## 2024-03-06 PROCEDURE — 71250 CT THORAX DX C-: CPT | Mod: 26 | Performed by: RADIOLOGY

## 2024-03-06 PROCEDURE — 82330 ASSAY OF CALCIUM: CPT | Performed by: STUDENT IN AN ORGANIZED HEALTH CARE EDUCATION/TRAINING PROGRAM

## 2024-03-06 PROCEDURE — 85610 PROTHROMBIN TIME: CPT

## 2024-03-06 PROCEDURE — 87040 BLOOD CULTURE FOR BACTERIA: CPT | Performed by: PHYSICIAN ASSISTANT

## 2024-03-06 PROCEDURE — 80202 ASSAY OF VANCOMYCIN: CPT | Performed by: TRANSPLANT SURGERY

## 2024-03-06 PROCEDURE — 999N000157 HC STATISTIC RCP TIME EA 10 MIN

## 2024-03-06 PROCEDURE — 250N000011 HC RX IP 250 OP 636: Performed by: TRANSPLANT SURGERY

## 2024-03-06 PROCEDURE — 85027 COMPLETE CBC AUTOMATED: CPT

## 2024-03-06 PROCEDURE — P9016 RBC LEUKOCYTES REDUCED: HCPCS

## 2024-03-06 PROCEDURE — 82150 ASSAY OF AMYLASE: CPT | Performed by: STUDENT IN AN ORGANIZED HEALTH CARE EDUCATION/TRAINING PROGRAM

## 2024-03-06 PROCEDURE — P9037 PLATE PHERES LEUKOREDU IRRAD: HCPCS

## 2024-03-06 PROCEDURE — 85025 COMPLETE CBC W/AUTO DIFF WBC: CPT | Performed by: STUDENT IN AN ORGANIZED HEALTH CARE EDUCATION/TRAINING PROGRAM

## 2024-03-06 PROCEDURE — 250N000011 HC RX IP 250 OP 636: Performed by: STUDENT IN AN ORGANIZED HEALTH CARE EDUCATION/TRAINING PROGRAM

## 2024-03-06 PROCEDURE — 94003 VENT MGMT INPAT SUBQ DAY: CPT

## 2024-03-06 PROCEDURE — 250N000011 HC RX IP 250 OP 636

## 2024-03-06 PROCEDURE — 99232 SBSQ HOSP IP/OBS MODERATE 35: CPT | Mod: 24 | Performed by: PHYSICIAN ASSISTANT

## 2024-03-06 PROCEDURE — 250N000013 HC RX MED GY IP 250 OP 250 PS 637

## 2024-03-06 PROCEDURE — 83735 ASSAY OF MAGNESIUM: CPT

## 2024-03-06 PROCEDURE — P9059 PLASMA, FRZ BETWEEN 8-24HOUR: HCPCS

## 2024-03-06 PROCEDURE — 82565 ASSAY OF CREATININE: CPT | Performed by: STUDENT IN AN ORGANIZED HEALTH CARE EDUCATION/TRAINING PROGRAM

## 2024-03-06 PROCEDURE — P9037 PLATE PHERES LEUKOREDU IRRAD: HCPCS | Performed by: STUDENT IN AN ORGANIZED HEALTH CARE EDUCATION/TRAINING PROGRAM

## 2024-03-06 PROCEDURE — 80053 COMPREHEN METABOLIC PANEL: CPT | Performed by: STUDENT IN AN ORGANIZED HEALTH CARE EDUCATION/TRAINING PROGRAM

## 2024-03-06 PROCEDURE — 82330 ASSAY OF CALCIUM: CPT

## 2024-03-06 PROCEDURE — 83735 ASSAY OF MAGNESIUM: CPT | Performed by: STUDENT IN AN ORGANIZED HEALTH CARE EDUCATION/TRAINING PROGRAM

## 2024-03-06 PROCEDURE — 250N000009 HC RX 250

## 2024-03-06 PROCEDURE — P9012 CRYOPRECIPITATE EACH UNIT: HCPCS

## 2024-03-06 PROCEDURE — 999N000065 XR ABDOMEN PORT 1 VIEW

## 2024-03-06 PROCEDURE — 93975 VASCULAR STUDY: CPT | Mod: 26 | Performed by: RADIOLOGY

## 2024-03-06 PROCEDURE — 82805 BLOOD GASES W/O2 SATURATION: CPT

## 2024-03-06 PROCEDURE — 93975 VASCULAR STUDY: CPT

## 2024-03-06 PROCEDURE — P9012 CRYOPRECIPITATE EACH UNIT: HCPCS | Performed by: STUDENT IN AN ORGANIZED HEALTH CARE EDUCATION/TRAINING PROGRAM

## 2024-03-06 PROCEDURE — 250N000009 HC RX 250: Performed by: PHYSICIAN ASSISTANT

## 2024-03-06 PROCEDURE — 85384 FIBRINOGEN ACTIVITY: CPT | Performed by: STUDENT IN AN ORGANIZED HEALTH CARE EDUCATION/TRAINING PROGRAM

## 2024-03-06 PROCEDURE — 84132 ASSAY OF SERUM POTASSIUM: CPT

## 2024-03-06 PROCEDURE — C9113 INJ PANTOPRAZOLE SODIUM, VIA: HCPCS

## 2024-03-06 PROCEDURE — 99291 CRITICAL CARE FIRST HOUR: CPT | Mod: 24 | Performed by: SURGERY

## 2024-03-06 PROCEDURE — P9016 RBC LEUKOCYTES REDUCED: HCPCS | Performed by: STUDENT IN AN ORGANIZED HEALTH CARE EDUCATION/TRAINING PROGRAM

## 2024-03-06 PROCEDURE — 200N000002 HC R&B ICU UMMC

## 2024-03-06 PROCEDURE — 44500 INTRO GASTROINTESTINAL TUBE: CPT

## 2024-03-06 PROCEDURE — 71045 X-RAY EXAM CHEST 1 VIEW: CPT

## 2024-03-06 PROCEDURE — 80197 ASSAY OF TACROLIMUS: CPT | Performed by: STUDENT IN AN ORGANIZED HEALTH CARE EDUCATION/TRAINING PROGRAM

## 2024-03-06 PROCEDURE — 74018 RADEX ABDOMEN 1 VIEW: CPT | Mod: 26 | Performed by: RADIOLOGY

## 2024-03-06 PROCEDURE — 71250 CT THORAX DX C-: CPT

## 2024-03-06 PROCEDURE — 250N000013 HC RX MED GY IP 250 OP 250 PS 637: Performed by: STUDENT IN AN ORGANIZED HEALTH CARE EDUCATION/TRAINING PROGRAM

## 2024-03-06 PROCEDURE — 93975 VASCULAR STUDY: CPT | Mod: 77

## 2024-03-06 PROCEDURE — 250N000009 HC RX 250: Performed by: STUDENT IN AN ORGANIZED HEALTH CARE EDUCATION/TRAINING PROGRAM

## 2024-03-06 PROCEDURE — P9045 ALBUMIN (HUMAN), 5%, 250 ML: HCPCS | Mod: JZ

## 2024-03-06 PROCEDURE — 250N000012 HC RX MED GY IP 250 OP 636 PS 637: Performed by: STUDENT IN AN ORGANIZED HEALTH CARE EDUCATION/TRAINING PROGRAM

## 2024-03-06 PROCEDURE — 83690 ASSAY OF LIPASE: CPT | Performed by: STUDENT IN AN ORGANIZED HEALTH CARE EDUCATION/TRAINING PROGRAM

## 2024-03-06 PROCEDURE — 999N000185 HC STATISTIC TRANSPORT TIME EA 15 MIN

## 2024-03-06 PROCEDURE — 85018 HEMOGLOBIN: CPT | Performed by: STUDENT IN AN ORGANIZED HEALTH CARE EDUCATION/TRAINING PROGRAM

## 2024-03-06 PROCEDURE — 36415 COLL VENOUS BLD VENIPUNCTURE: CPT | Performed by: PHYSICIAN ASSISTANT

## 2024-03-06 RX ORDER — GUAIFENESIN 600 MG/1
15 TABLET, EXTENDED RELEASE ORAL DAILY
Status: DISCONTINUED | OUTPATIENT
Start: 2024-03-06 | End: 2024-03-11

## 2024-03-06 RX ORDER — DEXTROSE MONOHYDRATE 100 MG/ML
INJECTION, SOLUTION INTRAVENOUS CONTINUOUS PRN
Status: DISCONTINUED | OUTPATIENT
Start: 2024-03-06 | End: 2024-03-13

## 2024-03-06 RX ORDER — FLUCONAZOLE 2 MG/ML
400 INJECTION, SOLUTION INTRAVENOUS EVERY 24 HOURS
Qty: 1200 ML | Refills: 0 | Status: DISCONTINUED | OUTPATIENT
Start: 2024-03-07 | End: 2024-03-07

## 2024-03-06 RX ORDER — ASPIRIN 325 MG
325 TABLET ORAL DAILY
Status: DISCONTINUED | OUTPATIENT
Start: 2024-03-07 | End: 2024-03-21 | Stop reason: HOSPADM

## 2024-03-06 RX ORDER — DEXMEDETOMIDINE HYDROCHLORIDE 4 UG/ML
.1-1.2 INJECTION, SOLUTION INTRAVENOUS CONTINUOUS
Status: DISCONTINUED | OUTPATIENT
Start: 2024-03-06 | End: 2024-03-09

## 2024-03-06 RX ORDER — PIPERACILLIN SODIUM, TAZOBACTAM SODIUM 3; .375 G/15ML; G/15ML
3.38 INJECTION, POWDER, LYOPHILIZED, FOR SOLUTION INTRAVENOUS EVERY 6 HOURS
Status: DISCONTINUED | OUTPATIENT
Start: 2024-03-06 | End: 2024-03-10

## 2024-03-06 RX ORDER — VALGANCICLOVIR 450 MG/1
450 TABLET, FILM COATED ORAL EVERY OTHER DAY
Status: DISCONTINUED | OUTPATIENT
Start: 2024-03-08 | End: 2024-03-07

## 2024-03-06 RX ORDER — VALGANCICLOVIR HYDROCHLORIDE 50 MG/ML
450 POWDER, FOR SOLUTION ORAL EVERY OTHER DAY
Status: DISCONTINUED | OUTPATIENT
Start: 2024-03-08 | End: 2024-03-07

## 2024-03-06 RX ORDER — AMOXICILLIN 250 MG
1-2 CAPSULE ORAL 2 TIMES DAILY
Status: DISCONTINUED | OUTPATIENT
Start: 2024-03-06 | End: 2024-03-09

## 2024-03-06 RX ORDER — AMINO ACIDS/PROTEIN HYDROLYS 11G-40/45
1 LIQUID IN PACKET (ML) ORAL 2 TIMES DAILY
Status: DISCONTINUED | OUTPATIENT
Start: 2024-03-06 | End: 2024-03-08

## 2024-03-06 RX ORDER — POLYETHYLENE GLYCOL 3350 17 G/17G
17 POWDER, FOR SOLUTION ORAL DAILY
Status: DISCONTINUED | OUTPATIENT
Start: 2024-03-07 | End: 2024-03-07

## 2024-03-06 RX ORDER — OXYCODONE HYDROCHLORIDE 5 MG/1
5 TABLET ORAL EVERY 4 HOURS PRN
Status: DISCONTINUED | OUTPATIENT
Start: 2024-03-06 | End: 2024-03-11

## 2024-03-06 RX ORDER — MAGNESIUM SULFATE HEPTAHYDRATE 40 MG/ML
2 INJECTION, SOLUTION INTRAVENOUS ONCE
Qty: 50 ML | Refills: 0 | Status: COMPLETED | OUTPATIENT
Start: 2024-03-06 | End: 2024-03-06

## 2024-03-06 RX ORDER — OXYCODONE HYDROCHLORIDE 10 MG/1
10 TABLET ORAL EVERY 4 HOURS PRN
Status: DISCONTINUED | OUTPATIENT
Start: 2024-03-06 | End: 2024-03-11

## 2024-03-06 RX ORDER — LIDOCAINE HYDROCHLORIDE 20 MG/ML
5 SOLUTION OROPHARYNGEAL ONCE
Status: COMPLETED | OUTPATIENT
Start: 2024-03-06 | End: 2024-03-06

## 2024-03-06 RX ORDER — NYSTATIN 100000/ML
10 SUSPENSION, ORAL (FINAL DOSE FORM) ORAL 4 TIMES DAILY
Status: DISCONTINUED | OUTPATIENT
Start: 2024-03-13 | End: 2024-03-09

## 2024-03-06 RX ORDER — PROPOFOL 10 MG/ML
5-40 INJECTION, EMULSION INTRAVENOUS CONTINUOUS
Status: DISCONTINUED | OUTPATIENT
Start: 2024-03-06 | End: 2024-03-07

## 2024-03-06 RX ADMIN — CHLORHEXIDINE GLUCONATE 15 ML: 1.2 SOLUTION ORAL at 07:41

## 2024-03-06 RX ADMIN — MYCOPHENOLATE MOFETIL 750 MG: 200 POWDER, FOR SUSPENSION ORAL at 07:40

## 2024-03-06 RX ADMIN — NOREPINEPHRINE BITARTRATE 0.03 MCG/KG/MIN: 0.06 INJECTION, SOLUTION INTRAVENOUS at 11:21

## 2024-03-06 RX ADMIN — SODIUM CHLORIDE 200 MG: 9 INJECTION, SOLUTION INTRAVENOUS at 08:23

## 2024-03-06 RX ADMIN — TACROLIMUS 2 MG: 5 CAPSULE ORAL at 18:25

## 2024-03-06 RX ADMIN — ASPIRIN 325 MG ORAL TABLET 325 MG: 325 PILL ORAL at 07:39

## 2024-03-06 RX ADMIN — DEXMEDETOMIDINE HYDROCHLORIDE 0.4 MCG/KG/HR: 400 INJECTION INTRAVENOUS at 22:12

## 2024-03-06 RX ADMIN — PROPOFOL 25 MCG/KG/MIN: 10 INJECTION, EMULSION INTRAVENOUS at 03:28

## 2024-03-06 RX ADMIN — URSODIOL 300 MG: 300 CAPSULE ORAL at 07:43

## 2024-03-06 RX ADMIN — INSULIN HUMAN 4 UNITS/HR: 1 INJECTION, SOLUTION INTRAVENOUS at 08:31

## 2024-03-06 RX ADMIN — PIPERACILLIN SODIUM AND TAZOBACTAM SODIUM 4.5 G: 4; .5 INJECTION, POWDER, LYOPHILIZED, FOR SOLUTION INTRAVENOUS at 13:06

## 2024-03-06 RX ADMIN — DEXMEDETOMIDINE HYDROCHLORIDE 0.2 MCG/KG/HR: 400 INJECTION INTRAVENOUS at 10:42

## 2024-03-06 RX ADMIN — PANTOPRAZOLE SODIUM 40 MG: 40 INJECTION, POWDER, FOR SOLUTION INTRAVENOUS at 07:39

## 2024-03-06 RX ADMIN — CHLORHEXIDINE GLUCONATE 15 ML: 1.2 SOLUTION ORAL at 20:07

## 2024-03-06 RX ADMIN — VALGANCICLOVIR HYDROCHLORIDE 450 MG: 50 POWDER, FOR SOLUTION ORAL at 07:40

## 2024-03-06 RX ADMIN — PIPERACILLIN SODIUM AND TAZOBACTAM SODIUM 4.5 G: 4; .5 INJECTION, POWDER, LYOPHILIZED, FOR SOLUTION INTRAVENOUS at 07:39

## 2024-03-06 RX ADMIN — PROPOFOL 15 MCG/KG/MIN: 10 INJECTION, EMULSION INTRAVENOUS at 08:34

## 2024-03-06 RX ADMIN — MAGNESIUM SULFATE IN WATER 2 G: 40 INJECTION, SOLUTION INTRAVENOUS at 10:01

## 2024-03-06 RX ADMIN — PIPERACILLIN SODIUM AND TAZOBACTAM SODIUM 4.5 G: 4; .5 INJECTION, POWDER, LYOPHILIZED, FOR SOLUTION INTRAVENOUS at 01:28

## 2024-03-06 RX ADMIN — URSODIOL 300 MG: 300 CAPSULE ORAL at 19:41

## 2024-03-06 RX ADMIN — SULFAMETHOXAZOLE AND TRIMETHOPRIM 1 TABLET: 400; 80 TABLET ORAL at 07:41

## 2024-03-06 RX ADMIN — Medication 1 PACKET: at 19:40

## 2024-03-06 RX ADMIN — FLUCONAZOLE 400 MG: 2 INJECTION, SOLUTION INTRAVENOUS at 09:51

## 2024-03-06 RX ADMIN — LIDOCAINE HYDROCHLORIDE 3.5 ML: 20 SOLUTION OROPHARYNGEAL at 10:41

## 2024-03-06 RX ADMIN — POLYETHYLENE GLYCOL 3350 17 G: 17 POWDER, FOR SOLUTION ORAL at 08:01

## 2024-03-06 RX ADMIN — DOCUSATE SODIUM 50 MG AND SENNOSIDES 8.6 MG 2 TABLET: 8.6; 5 TABLET, FILM COATED ORAL at 19:41

## 2024-03-06 RX ADMIN — ALBUMIN HUMAN 25 G: 0.05 INJECTION, SOLUTION INTRAVENOUS at 09:40

## 2024-03-06 RX ADMIN — PROPOFOL 20 MCG/KG/MIN: 10 INJECTION, EMULSION INTRAVENOUS at 18:23

## 2024-03-06 RX ADMIN — TACROLIMUS 2 MG: 5 CAPSULE ORAL at 07:41

## 2024-03-06 RX ADMIN — PIPERACILLIN AND TAZOBACTAM 3.38 G: 3; .375 INJECTION, POWDER, FOR SOLUTION INTRAVENOUS at 19:36

## 2024-03-06 RX ADMIN — DOCUSATE SODIUM 50 MG AND SENNOSIDES 8.6 MG 1 TABLET: 8.6; 5 TABLET, FILM COATED ORAL at 07:41

## 2024-03-06 RX ADMIN — MYCOPHENOLATE MOFETIL 750 MG: 200 POWDER, FOR SUSPENSION ORAL at 18:25

## 2024-03-06 ASSESSMENT — ACTIVITIES OF DAILY LIVING (ADL)
ADLS_ACUITY_SCORE: 50
ADLS_ACUITY_SCORE: 50
ADLS_ACUITY_SCORE: 48
DOING_ERRANDS_INDEPENDENTLY_DIFFICULTY: OTHER (SEE COMMENTS)
ADLS_ACUITY_SCORE: 46
ADLS_ACUITY_SCORE: 46
ADLS_ACUITY_SCORE: 50
ADLS_ACUITY_SCORE: 46
DRESSING/BATHING_DIFFICULTY: OTHER (SEE COMMENTS)
ADLS_ACUITY_SCORE: 50
ADLS_ACUITY_SCORE: 46
ADLS_ACUITY_SCORE: 50
DIFFICULTY_EATING/SWALLOWING: OTHER (SEE COMMENTS)
ADLS_ACUITY_SCORE: 50
TOILETING_ISSUES: OTHER (SEE COMMENTS)
ADLS_ACUITY_SCORE: 50
ADLS_ACUITY_SCORE: 46
ADLS_ACUITY_SCORE: 50
WEAR_GLASSES_OR_BLIND: OTHER (SEE COMMENTS)
DIFFICULTY_COMMUNICATING: NO
ADLS_ACUITY_SCORE: 50
CONCENTRATING,_REMEMBERING_OR_MAKING_DECISIONS_DIFFICULTY: OTHER (SEE COMMENTS)
ADLS_ACUITY_SCORE: 50
WALKING_OR_CLIMBING_STAIRS: OTHER (SEE COMMENTS)
EQUIPMENT_CURRENTLY_USED_AT_HOME: OTHER (SEE COMMENTS)
ADLS_ACUITY_SCORE: 50
WALKING_OR_CLIMBING_STAIRS_DIFFICULTY: OTHER (SEE COMMENTS)
ADLS_ACUITY_SCORE: 48

## 2024-03-06 NOTE — PHARMACY-CONSULT NOTE
Pharmacy Tube Feeding Consult    Medication reviewed for administration by feeding tube and for potential food/drug interactions.    Recommendation: No changes are needed at this time.     Pharmacy will continue to follow as new medications are ordered.    Celsa Echevarria, José ManuelD, BCCCP

## 2024-03-06 NOTE — PLAN OF CARE
Major Shift Events: Sedated on propfol, fentanyl, and precedex RASS -3 to -4, unable to wean sedation more due to agitation and hemodynamic instability. Pupils equal and brisk. Moves spontaneously to painful stimuli. SR 60s, rare PACs, magnesium replaced today. MAP > 65 on/off levo and when given blood products. Afebrile. Edema +3/+4 in all extremities. Palpable pulses. CMV settings, coarse LS. NPO. TF started at 10mL/hr with standard FWF. Insulin drip stable on algorithm 4. Low UOP, SICU and transplant aware. No BM this shift. Blanchable redness on upper lip from ETT, gauze in place. TIMOTHY with high output, transplant and SICU aware. After CT scan around noon TIMOTHY started dumping significant amount of blood in a steady drip. Transplant came by to assess and suggested that nursing staff only empty TIMOTHY drain Q2 hours. Unable to keep drain to bulb suction due to bleeding when the drain is emptied, order modified to be to gravity drainage.  Plan: Continue to monitor.  For vital signs and complete assessments, please see documentation flowsheets.

## 2024-03-06 NOTE — PROGRESS NOTES
Patient removed from OS waitlist after  donor liver transplant. OS ID [id number].    Donor Has Risk Criteria for Transmission of HIV/HCV/HBV: No  Recipient Notified of Risk Criteria: N/A

## 2024-03-06 NOTE — PROCEDURES
Small Bowel Feeding Tube Placement Assessment  Reason for Feeding Tube Placement: post pyloric enteral access for nutrition and medication administration  Cortrak Start Time: 1000   Cortrak End Time: 1030  Medicine Delivered During Procedure: 2% viscous lidocaine gel   Placement Successful: yes per Cortrak tracing pending AXR confirmation      Procedure Complications: none  Final Placement Ren at exit of nare:  105 cm  Face to Face time with patient: 30 minutes    Bridle Placement:   Reason for bridle placement: securement of nasoenteric feeding tube    Medicine delivered during procedure: lidocaine gel   Procedure: Successful   Location of top of clip on FT: @ 107 cm marker   Condition of nose/skin at time of bridle placement: Unremarkable   Face to Face time with patient: < 5 minutes.    Kiera Ribeiro, MPH, RDN, LD  4E (SICU) RD pager: [phone] or Hilda [4E Clinical Dietitian]   Weekend/Holiday RD pager [phone]

## 2024-03-06 NOTE — PHARMACY-VANCOMYCIN DOSING SERVICE
"Pharmacy Vancomycin Note  Date of Service 2024  Patient's  1970   53 year old, male    Indication: Surgical Prophylaxis + bacteremia  Day of Therapy: 2  Current vancomycin regimen:  1250 mg IV q12h  Current vancomycin monitoring method: AUC  Current vancomycin therapeutic monitoring goal: 400-600 mg*h/L    [employer] Prediction of Current Vancomycin Regimen  AUC24,ss: 799 mg/L.hr  Probability of AUC24 > 400: 100 %  Ctrough,ss: 27.4 mg/L  Probability of Ctrough,ss > 20: 96 %  Probability of nephrotoxicity (Lodise SCOTT ): 34 %      Current estimated CrCl = Estimated Creatinine Clearance: 94.4 mL/min (based on SCr of 1.11 mg/dL).    Creatinine for last 3 days  3/4/2024: 10:58 AM Creatinine 1.08 mg/dL  3/5/2024:  4:09 PM Creatinine 0.89 mg/dL  3/6/2024:  5:00 AM Creatinine 1.11 mg/dL    Recent Vancomycin Levels (past 3 days)  3/6/2024: 10:51 AM Vancomycin 23.0 ug/mL    Vancomycin IV Administrations (past 72 hours)                     vancomycin (VANCOCIN) 1,250 mg in 0.9% NaCl 250 mL intermittent infusion (mg) 1,250 mg New Bag 24 2347     1,250 mg Given  1054     1,250 mg New Bag 24 2304     1,250 mg New Bag  1246                    Nephrotoxins and other renal medications (From now, onward)      Start     Dose/Rate Route Frequency Ordered Stop    24 1800  vancomycin (VANCOCIN) 1,500 mg in 0.9% NaCl 250 mL intermittent infusion         1,500 mg  over 90 Minutes Intravenous EVERY 24 HOURS 24 1208      24 0130  piperacillin-tazobactam (ZOSYN) 4.5 g vial to attach to  mL bag        Note to Pharmacy: For [facility], [facility] and [facility]: For Zosyn-naive patients, use the \"Zosyn initial dose + extended infusion\" order panel.    4.5 g  over 30 Minutes Intravenous EVERY 6 HOURS 24 2255 03/10/24 1929    24 1800  tacrolimus (GENERIC) capsule 2 mg        [facility] for [facility] Orders Report.    2 mg Oral 2 TIMES DAILY. 24 1529      24 1800  tacrolimus " (GENERIC) suspension 2 mg        [facility].    2 mg Oral or NG Tube 2 TIMES DAILY. 03/05/24 1529      03/05/24 1800  norepinephrine (LEVOPHED) 16 mg in  mL infusion MAX CONC CENTRAL LINE         0.01-0.6 mcg/kg/min × 107.2 kg  1-60.3 mL/hr  Intravenous CONTINUOUS 03/05/24 1733                 Contrast Orders - past 72 hours (72h ago, onward)      None            Interpretation of levels and current regimen:  Vancomycin level is reflective of AUC greater than 600    Has serum creatinine changed greater than 50% in last 72 hours: No    Urine output:  diminished urine output post-operatively    Renal Function: High risk for post-op ANGEL, may need to change to intermittent dosing/trough monitoring    [employer] Prediction of Planned New Vancomycin Regimen  AUC24,ss: 502 mg/L.hr  Probability of AUC24 > 400: 95 %  Ctrough,ss: 14.3 mg/L  Probability of Ctrough,ss > 20: 7 %  Probability of nephrotoxicity (Lodise SCOTT 2009): 9 %      Plan:  Change to 1500mg IV vancomycin q24h.   Vancomycin monitoring method: AUC  Vancomycin therapeutic monitoring goal: 400-600 mg*h/L  Pharmacy will check vancomycin levels as appropriate in 1-3 Days.  Serum creatinine levels will be ordered daily for the first week of therapy and at least twice weekly for subsequent weeks.    Celsa Echevarria, PharmD, BCCCP    Addendum 3/6/24 PM:   With SCr continuing to uptrend and low UOP today, will change to intermittent dosing of vancomycin for now, with a level check 3/7AM to help quantify clearance.

## 2024-03-06 NOTE — TELEPHONE ENCOUNTER
Routing to provider as an update.     Celsa Jiménez, EDDAN, RN   [facility]

## 2024-03-06 NOTE — PROGRESS NOTES
[facility]    ICU Progress Note     Primary Team: Transplant Surgery   Reason for Critical Care Admission: Ventilator Management and Hemodynamic Monitoring  Admitting Physician: Ryder Cortez MD  Date of Admission:  3/4/2024    Assessment: Critical Care   Mary Lopez is a 53 year old male admitted on 3/4/2024. He has a history of alcoholic cirrhosis complicated by hepatic encephalopathy and suspected SBP who presented to the hospital for consideration of  donor liver transplant. He was recently admitted for S.bovis bacteremia and was discharged on amoxicillin and levofloxacin. He underwent DDLT on 3/5/2024 and was admitted to the SICU following his surgery for ventilator management and hemodynamic monitoring. Intra-operatively, patient was transfused 7 U of pRBC and 10 units of platelets. He was extubated in the OR and upon admission to the ICU, he was on oxygen mask, off pressors and sedation. However, became unresponsive shortly after with oxygen saturation dropping to 70s and had to be re-intubated. Currently stable on ventilator. Increasing pressor requirement early in the morning and received multiple units of blood products following the morning labs. Pressors weaned off following transfusion.      CHANGES TODAY:  - Post-pyloric feeding tube placement and TF to start   - Switch to precede from propofol   - Two more units of cryoprecipitate  - Repeat liver US   - 500 cc 5% albumin bolus      Plan: Critical Care   Neuro/ pain/ sedation:  # Post-operative pain   # Hepatic Encephalopathy   - Monitor neurological status. Notify provider for any acute changes in exam  - Sedation: Propofol gtt off, start precedex for sedation   - Pain: Fentanyl gtt, PRN Oxy and dilaudid  - Sleep: PTA melatonin and PRN trazodone, will resume once extubated         Pulmonary:  # Acute hypoxic respiratory failure  # Pleural effusion, bilateral   - Vent Mode: CMV/AC   (Continuous Mandatory Ventilation/ Assist Control)  FiO2 (%): 50 %  Resp Rate (Set): 16 breaths/min  Tidal Volume (Set, mL): 500 mL  PEEP (cm H2O): 5 cmH2O  Resp: 16  - CXR (3/6): Bilateral pleural effusion, slight increase on the right side compared to prior CXR     Cardiovascular:  # Multifactorial shock (likely hemorrhagic)  - Monitor hemodynamic status.  - MAP goal > 65, currently off pressors, norepinephrine available if needed    - PTA midodrine currently held  - 500 cc 5% albumin bolus      GI/Nutrition:  # S/p  Donor Liver Transplant   # Esophageal 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   # Portal Hypertension   - Diet: NPO  - Nutrition consulted. Appreciate recommendations.   - Post-pyloric FT placement and tube feeds to start today  - Bowel regimen   - Post-transplant prophylaxis: Tacrolimus, Mycophenolate and steroid taper   - Monitor daily hepatic panel   - TIMOTHY drain management per transplant (high output post-operatively)  - Repeat liver US pending     Renal/ Fluid Balance:   # Hyponatremia  - Will monitor intake and output  - ICU electrolyte replacement protocol  - Monitor daily BMP  - PTA torsemide and spironolactone on hold for now       Endocrine:  # Stress Hyperglycemia   # Type 2 Diabetes Mellitus    - Maintain blood glucose levels < 180   - Insulin gtt   - Steroid taper per transplant team     ID:  - Monitor fever curve and daily CBC   - Post-transplant prophylaxis       - Zosyn x 5 days        - Fluconazole x 7 days        - Valcyte        - Vancomycin    Hematology:  # Acute Blood Loss Anemia  # Macrocytic Anemia   # Chronic Thrombocytopenia  - Monitor daily CBC and q4h Hgb   - Hgb > 8, INR < 2, Fibrinogen > 200, transfuse as needed   - Received 4 units of cryoprecipitate, 2 units of FFP, 5 units of RBCs and 1 unit of platelets post-operatively, two more units of cryoprecipitate for low fibrinogen this morning    MSK:   - PT and OT consulted. Appreciate recommendations.    Lines/ tubes/ drains: R  "internal jugular CVC x 1, left brachial a-line, PIV x 3 (2 L and 1R), TIMOTHY on the right side of the abdomen    Prophylaxis:  - DVT Prophylaxis: Pneumatic Compression Devices  - PUD Prophylaxis: PPI while intubated     Code Status: Full Code      Disposition:  - SICU    The patient's care was discussed with the Attending Physician, Dr. Starr .    Clinically Significant Risk Factors Present on Admission         # Hyponatremia: Lowest Na = 129 mmol/L in last 2 days, will monitor as appropriate  # Hypocalcemia: Lowest iCa = 3.6 mg/dL in last 2 days, will monitor and replace as appropriate  # Hypercalcemia: Highest iCa = 5.6 mg/dL in last 2 days, will monitor as appropriate  # Hypomagnesemia: Lowest Mg = 1.1 mg/dL in last 2 days, will replace as needed   # Hypoalbuminemia: Lowest albumin = 2 g/dL at 3/5/2024  4:09 PM, will monitor as appropriate    # Coagulation Defect: INR = 1.57 (Ref range: 0.85 - 1.15) and/or PTT = 30 Seconds (Ref range: 22 - 38 Seconds), will monitor for bleeding  # Thrombocytopenia: Lowest platelets = 56 in last 2 days, will monitor for bleeding   # Hypertension: Noted on problem list   # Circulatory Shock: required vasopressors within past 24 hours    # Acute Respiratory Failure: based on blood gas results.  Continue supplemental oxygen as needed     # Obesity: Estimated body mass index is 37.12 kg/m  as calculated from the following:    Height as of this encounter: 1.73 m (5' 8.11\").    Weight as of this encounter: 111.1 kg (244 lb 14.9 oz).    # Severe Malnutrition: based on nutrition assessment       # Financial/Environmental Concerns: none      # Anemia: based on hgb <11       Nirmal Presley MD  [facility]  Securely message with [employer] (more info)  Text page via [employer] Referrals Paging/Directory   _____________________________________________________________________    Chief Complaint   EtOH cirrhosis s/p DDLT     History of Present Illness   Mary Smith" Jessica is a 53 year old male with a history of type 2 DM, HTN, HLD, alcoholic cirrhosis complicated by hepatic encephalopathy, hyponatremia, chronic thrombocytopenia, macrocytic anemia and suspected SBP who presented to the hospital for consideration of  donor liver transplant. He was recently admitted for S.bovis bacteremia and was discharged on amoxicillin and levofloxacin. He was reactivate on the transplant list as of 2024, and underwent DDLT on 3/5/2024 as a  donor organ became available.     Review of Systems    The 10 point Review of Systems is negative other than noted in the HPI or here.     Past Medical History    I have reviewed this patient's medical history and updated it with pertinent information if needed.   Past Medical History:   Diagnosis Date    ANGEL (acute kidney injury) (H24)     Alcoholic cirrhosis of liver without ascites (H) 2023    Alcoholic hepatitis with ascites (H28) 10/03/2023    Alcoholic hepatitis without ascites (H28) 2023    Closed fracture of one rib of left side 2023    Concussion without loss of consciousness 2020    Decompensated hepatic cirrhosis (H) 09/15/2023    Diabetes mellitus, type 2 (H) 2023    Essential hypertension 2020    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)     Mild hyperlipidemia 2021    Persistent insomnia 2023    Portal hypertension (H) 2023    Scrotal abscess     Secondary esophageal 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 (H) 2023    Tobacco abuse disorder 2020    Type 2 diabetes mellitus with hyperglycemia (H) 2023     Past Surgical History   I have reviewed this patient's surgical history and updated it with pertinent information if needed.  Past Surgical History:   Procedure Laterality Date    BENCH LIVER  3/5/2024    Procedure: Bench liver;  Surgeon: Ryder Cortez MD;  Location: [facility] OR    CHOLECYSTECTOMY      COLONOSCOPY N/A 2024    Procedure:  COLONOSCOPY, WITH POLYPECTOMY;  Surgeon: Jak Urbina MD;  Location: [facility]    CV RIGHT HEART CATH MEASUREMENTS RECORDED N/A 2024    Procedure: Right Heart Catheterization;  Surgeon: Alfred Tafoya MD;  Location:  HEART CARDIAC CATH LAB    TONSILLECTOMY      TRANSPLANT LIVER RECIPIENT  DONOR N/A 3/5/2024    Procedure: Transplant liver recipient  donor, bile duct stent placement;  Surgeon: Ryder Cortez MD;  Location: [facility] OR    VASECTOMY       Social History   I have reviewed this patient's social history and updated it with pertinent information if needed.    Social History     Tobacco Use    Smoking status: Former     Types: Cigarettes     Passive exposure: Never    Smokeless tobacco: Current     Types: Chew     Last attempt to quit: 2004    Tobacco comments:     Chew daily 1/3 of a tin per day   Vaping Use    Vaping Use: Never used   Substance Use Topics    Alcohol use: Not Currently     Alcohol/week: 12.0 standard drinks of alcohol     Types: 12 Standard drinks or equivalent per week     Comment: Sober since 2023    Drug use: Not Currently     Family History   I have reviewed this patient's family history and updated it with pertinent information if needed.  Family History   Problem Relation Age of Onset    Anxiety Disorder Mother     Depression Mother     Bipolar Disorder Mother     Chronic Obstructive Pulmonary Disease Mother     Lung Cancer Mother 81    Morbid Obesity Father     Diabetes Father     Diabetes Type 2  Brother     Substance Abuse Maternal Grandfather     Substance Abuse Paternal Grandfather     Colon Cancer No family hx of     Liver Disease No family hx of      Prior to Admission Medications   Prior to Admission Medications   Prescriptions Last Dose Informant Patient Reported? Taking?   Continuous Blood Gluc Sensor (DEXCOM G7 SENSOR) MISC   No No   Sig: Change every 10 days.   HYDROcodone-acetaminophen (NORCO) 5-325 MG tablet 3/3/2024  Yes Yes   Sig:  Take 1 tablet by mouth every 6 hours as needed for severe pain Patient took 1 tablet last night for pain.   Insulin Lispro (HUMALOG KWIKPEN) 200 UNIT/ML soln 3/4/2024  No Yes   Sig: Inject 8 Units Subcutaneous 4 times daily (with meals and nightly) Give 8 units before each meal. Give additional units for correction with sliding scale (1 unit for each order of 35 blood glucose >140 before meals).   amoxicillin (AMOXIL) 500 MG capsule 3/4/2024  No Yes   Sig: Take 2 capsules (1,000 mg) by mouth every 8 hours for 7 days   blood glucose (NO BRAND SPECIFIED) test strip   No No   Sig: Use to test blood sugar two times daily or as directed. To accompany: Blood Glucose Monitor Brands: per insurance.   blood glucose monitoring (NO BRAND SPECIFIED) meter device kit   No No   Sig: Use to test blood sugar twice times daily or as directed. Preferred blood glucose meter OR supplies to accompany: Blood Glucose Monitor Brands: per insurance.   folic acid (FOLVITE) 1 MG tablet 3/4/2024  No Yes   Sig: Take 1 tablet (1 mg) by mouth daily   insulin degludec (TRESIBA FLEXTOUCH) 100 UNIT/ML pen 3/3/2024 at 2:30 pm  No Yes   Sig: Inject 36 Units Subcutaneous daily Inject 36 units daily.   insulin lispro (HUMALOG KWIKPEN) 100 UNIT/ML (1 unit dial) KWIKPEN 3/4/2024  No Yes   Sig: Inject 1-10 Units Subcutaneous 3 times daily (before meals) for 50 days Correction scale: Give 1 unit insulin for every order of 35 that blood glucose level is >140 three times daily before meals and for every order of 35 that blood glucose is >200 at bedtime   insulin pen needle (BD PEN NEEDLE SHERRIE 2ND GEN) 32G X 4 MM miscellaneous   No No   Sig: USES 5 PER DAY   lactulose (CHRONULAC) 10 GM/15ML solution 3/4/2024  No Yes   Sig: TAKE 30 MLS BY MOUTH 2 TIMES DAILY   Patient taking differently: TAKE 30 MLS BY MOUTH 3 TIMES DAILY   levofloxacin (LEVAQUIN) 500 MG tablet 3/4/2024  No Yes   Sig: Take 1 tablet (500 mg) by mouth daily for 6 days   melatonin 10 MG TABS  tablet 3/3/2024  No Yes   Sig: Take 1 tablet (10 mg) by mouth nightly as needed for sleep   midodrine (PROAMATINE) 10 MG tablet 3/4/2024  No Yes   Sig: Take 1 tablet (10 mg) by mouth every 8 hours for 30 days   multivitamin w/minerals (THERA-VIT-M) tablet 3/4/2024  No Yes   Sig: TAKE 1 TABLET BY MOUTH DAILY   omeprazole (PRILOSEC) 20 MG DR capsule 3/4/2024  Yes Yes   Sig: Take 1 capsule (20 mg) by mouth 2 times daily   potassium chloride ER (KLOR-CON M) 10 MEQ CR tablet 3/4/2024  No Yes   Sig: Take 2 tablets (20 mEq) by mouth 2 times daily   rifaximin (XIFAXAN) 550 MG TABS tablet 3/4/2024  No Yes   Sig: Take 1 tablet (550 mg) by mouth 2 times daily for 180 days   spironolactone (ALDACTONE) 25 MG tablet 3/4/2024  No Yes   Sig: Take 1 tablet (25 mg) by mouth daily   thiamine (B-1) 100 MG tablet 3/4/2024  No Yes   Sig: Take 1 tablet (100 mg) by mouth daily for 30 days   thin (NO BRAND SPECIFIED) lancets   No No   Sig: Use with lanceting device. To accompany: Blood Glucose Monitor Brands: per insurance.   torsemide (DEMADEX) 10 MG tablet 3/4/2024  No Yes   Sig: Take 3 tablets (30 mg) by mouth daily as needed Take as needed for fluid once daily if systolic blood pressure (top number) is 100 or greater.   traZODone (DESYREL) 50 MG tablet 3/3/2024  No Yes   Sig: Take 0.5 tablets (25 mg) by mouth nightly as needed for sleep      Facility-Administered Medications: None     Allergies   Allergies   Allergen Reactions    Other Food Allergy Anaphylaxis     Legumes (black beans, baked beans, chickpeas)    Pineapple Itching       Physical Exam   Vital Signs: Temp: 97.9  F (36.6  C) Temp src: Bladder   Pulse: 83   Resp: 16 SpO2: 95 % O2 Device: Mechanical Ventilator    Weight: 244 lbs 14.9 oz    General: Intubated and sedated  HEENT: ETT and OGT in place   Neuro: Unable to assess due to sedation  CV: RRR   Pulm: Equal breath sounds bilaterally   Abd: Soft, non-distended   : Moore in place   Extremities: Warm and well perfused, 1+  pitting edema on BLE   Incisions: Covered with dressing, dry with some saturation      Data   I reviewed all medications, new labs and imaging results over the last 24 hours.    Arterial Blood Gases   Recent Labs   Lab 03/06/24  0501 03/06/24  0137 03/05/24  2152 03/05/24  1805   PH 7.48* 7.46* 7.40 7.28*   PCO2 35 37 41 53*   PO2 66* 57* 60* 95   HCO3 26 26 25 25     Complete Blood Count   Recent Labs   Lab 03/06/24  0816 03/06/24  0500 03/06/24  0137 03/05/24  2201 03/05/24  1609 03/05/24  1519   WBC 16.1* 11.7*  --   --  23.1* 17.0*   HGB 8.3* 7.3* 7.5* 6.4* 9.8* 9.2*   * 128*  --   --  224 192     Basic Metabolic Panel  Recent Labs   Lab 03/06/24  0804 03/06/24  0625 03/06/24  0503 03/06/24  0500 03/05/24  1659 03/05/24  1609 03/05/24  1552 03/05/24  1506 03/05/24  1415 03/04/24  1257 03/04/24  1058 03/02/24  0751 03/02/24  0435   NA  --   --   --  136  --  135  --  134* 134*   < > 129*  --  128*   POTASSIUM  --   --   --  4.0  --  4.0  --  4.1 4.2   < > 4.3  --  4.4   CHLORIDE  --   --   --  104  --  103  --   --   --   --  96*  --  100   CO2  --   --   --  25  --  26  --   --   --   --  25  --  22   BUN  --   --   --  23.6*  --  17.3  --   --   --   --  17.5  --  19.3   CR  --   --   --  1.11  --  0.89  --   --   --   --  1.08  --  0.89   * 180* 147* 154*   < > 228*   < > 234* 235*   < > 392*   < > 331*    < > = values in this interval not displayed.     Liver Function Tests  Recent Labs   Lab 03/06/24  0816 03/06/24  0500 03/05/24  2338 03/05/24  1609 03/05/24  1519 03/04/24  1058 03/02/24  0435 03/01/24  0434   AST  --  310*  --  1,038*  --   --  89* 100*   ALT  --  294*  --  596*  --  50 50 52   ALKPHOS  --  47  --  71  --  181* 191* 166*   BILITOTAL  --  2.6*  --  8.7*  --  12.7* 9.8* 9.4*   ALBUMIN  --  2.3*  --  2.0*  --  2.6* 2.5* 2.2*   INR 1.57* 1.60* 1.64* 2.12*   < > 2.76* 3.18* 3.13*    < > = values in this interval not displayed.     Pancreatic Enzymes  Recent Labs   Lab  03/06/24  0500 03/04/24  1058   LIPASE 5*  --    AMYLASE 22* 10*     Coagulation Profile  Recent Labs   Lab 03/06/24  0816 03/06/24  0500 03/05/24  2338 03/05/24  1609 03/05/24  1519 03/04/24  1058   INR 1.57* 1.60* 1.64* 2.12* 2.12* 2.76*   PTT 30  --   --  87* 89* 42*     IMAGING:  Recent Results (from the past 24 hour(s))   XR Chest Port 1 View   Result Value    Radiologist flags Right apical pneumothorax (Urgent)    Narrative    EXAM: XR CHEST PORT 1 VIEW 3/5/2024 5:20 PM    DEMOGRAPHICS: 53 years Male    INDICATION: cvc    COMPARISON: Chest x-ray 2/4/2024    TECHNIQUE: Single portable AP view of the chest.    FINDINGS:   Endotracheal tube in the mid thoracic trachea. Enteric tube descends  below the diaphragm with tip out of field of view. Right IJ central  venous catheter with tip near the brachiocephalic confluence. Right  [place]-Ayo catheter with tip at the origin of the main pulmonary  artery.     The cardiomediastinal silhouette is stable. There is blunting of the  bilateral costophrenic angles. Low lung volumes with bibasilar streaky  opacities, left greater than right. Small right apical pneumothorax.  No acute osseous abnormality. Right upper quadrant surgical clips and  drain.      Impression    IMPRESSION:   1.  Right IJ central venous catheter with tip near the brachiocephalic  confluence and [place]-Ayo catheter tip within the main pulmonary  artery.  2.  Low lung volumes with bibasilar streaky opacities, likely  atelectasis.  3.  Small bilateral pleural effusions, left greater than right.  4.  Small right apical pneumothorax.    [Urgent Result: Right apical pneumothorax]    Finding was identified on 3/5/2024 5:28 PM.     BRIDGET Dunlap was contacted by Dr. Stevenson at 3/5/2024 6:23 PM and  verbalized understanding of the urgent finding.      I have personally reviewed the examination and initial interpretation  and I agree with the findings.    NIMISHA WONG DO         SYSTEM ID:  [id number]   XR Abdomen  Port 1 View    Narrative    EXAM: XR ABDOMEN PORT 1 VIEW  3/5/2024 5:21 PM     HISTORY:  assess OGT placements on arrival to SICU       COMPARISON:  CT 2/23/2024    FINDINGS:   Postoperative changes of the abdomen with surgical staples across  midline and surgical clips in the right upper quadrant/epigastrium.  Right upper quadrant surgical drain and biliary stent. Gastric tube  tip and sidehole project over the stomach. Partially visualized  support devices of the chest. There is gaseous distention of the  stomach.      Impression    IMPRESSION: Gastric tube tip and sidehole in the air distended  stomach.     I have personally reviewed the examination and initial interpretation  and I agree with the findings.    NIMISHA WONG DO         SYSTEM ID:  [id number]   US Liver Transplant    Narrative    EXAMINATION: US LIVER TRANSPLANT, 3/5/2024 6:03 PM     COMPARISON: None.    HISTORY: Liver transplant    TECHNIQUE:  Gray-scale, color Doppler and spectral flow analysis.    FINDINGS:   There is trace ascites. Small bilateral pleural effusions.    Liver:   The liver demonstrates normal homogeneous echotexture. No  evidence of a focal hepatic mass. Along the right hepatic lobe, there  is a 4.1 x 1.1 x 2.6 cm avascular, hypoechoic peritransplant  collection.    Bile Ducts: No intrahepatic biliary dilation.  The common bile duct  was not visualized.    Gallbladder: The gallbladder is surgically absent.    Kidneys:   Right kidney:  The right kidney demonstrates normal echotexture with  no evidence of a shadowing stone, focal mass or hydronephrosis.   12.0  cm in long axis dimension.  Left kidney:  The left kidney demonstrates normal echotexture with no  evidence of a shadowing stone, focal mass or hydronephrosis.   13.5 cm  in long axis dimension.    Pancreas: The visualized portions of the pancreatic head are  unremarkable. Remainder of the pancreas was not well-visualized.    Spleen:  The spleen is mildly enlarged, measuring  14.6 cm.    The proximal aorta was not well-visualized.    LIVER DOPPLER:  Splenic vein:  Patent continuous normal antegrade direction flow  towards the liver, 43 cm/sec.  Extrahepatic portal vein:  Patent continuous antegrade flow, 34  cm/sec.  Portal vein at anastomosis: Patent continuous antegrade flow, 25  cm/sec.  Intrahepatic portal vein:  Patent continuous antegrade flow, 87  cm/sec.  Right portal vein flow is antegrade, measuring 39 cm/sec.  Left portal vein flow is antegrade, measuring 24 cm/sec.    Inferior vena cava: patent with flow toward the heart throughout..  IVC above anastomosis:  116 cm/sec.  IVC at anastomosis:  123 cm/sec.  Intrahepatic IVC:  121 cm/sec.  Extrahepatic IVC:  101 cm/sec.    Right, mid, left hepatic veins: Patent with flow towards the inferior  vena cava.    Extrahepatic hepatic artery: Low resistance waveform with flow towards  the liver. 162 cm/sec with resistive index 0.71.  Right hepatic artery: 75 cm/sec with resistive index 0.60.  Left hepatic artery: 44 cm/sec with resistive index 0.84.      Impression    Impression:   1.  Normal grayscale and Doppler evaluation of the transplant liver.  2.  There is a 4.1 x 1.1 x 2.6 cm peritransplant collection adjacent  to the right hepatic lobe.  3.  Bilateral pleural effusions.  4.  Trace ascites.    I have personally reviewed the examination and initial interpretation  and I agree with the findings.    NIMISHA WONG DO         SYSTEM ID:  [id number]   XR Chest Port 1 View    Narrative    EXAM: XR CHEST PORT 1 VIEW 3/5/2024 10:52 PM    DEMOGRAPHICS: 53 years Male    INDICATION: eval R apical PTX    COMPARISON: Chest x-ray 3/5/2024 at 1701 hours    TECHNIQUE: Single portable supine AP view of the chest.    FINDINGS:   Endotracheal tube in the mid thoracic trachea. Enteric tube descends  below the diaphragm with tip out of field of view. Right IJ central  venous catheter with tip near the brachiocephalic confluence. Right  [place]-Ayo  catheter has been removed.     The cardiomediastinal silhouette is stable. There is blunting of the  bilateral costophrenic angles. Low lung volumes with bibasilar streaky  opacities, left greater than right. No appreciable pneumothorax on  this supine image. No acute osseous abnormality. Right upper quadrant  surgical clips and drain.      Impression    IMPRESSION:   1.  No appreciable pneumothorax although evaluation is limited on this  supine image.  2.  Low lung volumes with bibasilar streaky opacities, likely  atelectasis.  3.  Small bilateral pleural effusions, left greater than right.      I have personally reviewed the examination and initial interpretation  and I agree with the findings.    JULIO JENKINS MD         SYSTEM ID:  [id number]   US Liver Transplant Follow Up   Result Value    Radiologist flags Concern for new or growing subcapsular hematoma (Urgent)    Narrative    EXAMINATION: US LIVER TRANSPLANT FOLLOW UP, 3/6/2024 1:01 AM     COMPARISON: 3/5/2024.    HISTORY: Postop DDLT    TECHNIQUE:  Gray-scale, color Doppler and spectral flow analysis.    FINDINGS:   There is trace ascites. Bilateral pleural effusions.    Liver:   The liver demonstrates normal homogeneous echotexture. No  evidence of a focal hepatic mass. Ill-defined heterogeneous area in  the right lobe of the liver, new compared to prior evaluation.  Invaginating echogenic material likely represents post surgical air.    Bile Ducts: No intrahepatic ductal dilation.  The common bile duct is  not seen.    Gallbladder: The gallbladder is surgically absent.    Kidneys:   Right kidney:  The right kidney demonstrates normal echotexture with  no evidence of a shadowing stone, focal mass or hydronephrosis.   12.6  cm in long axis dimension.  Left kidney: Not evaluated.     Pancreas: Not visualized.    Spleen:  The spleen is enlarged, measuring 15.3 cm.    Visualized portions of the aorta are unremarkable.    LIVER DOPPLER:  Splenic vein:   Patent continuous normal antegrade direction flow  towards the liver, 68 cm/sec.  Extrahepatic portal vein:  Patent continuous antegrade flow, 51  cm/sec.  Portal vein at anastomosis: Patent continuous antegrade flow, 128  cm/sec.  Intrahepatic portal vein:  Patent continuous antegrade flow, 116  cm/sec.  Right portal vein flow is antegrade, measuring 46 cm/sec.  Left portal vein flow is antegrade, measuring 16 cm/sec.    Inferior vena cava: patent with flow toward the heart throughout..  IVC above anastomosis:  97 cm/sec.  IVC at anastomosis:  108 cm/sec.  Intrahepatic IVC:  87 cm/sec.  Extrahepatic IVC:  75 cm/sec.    Right, mid, left hepatic veins: Patent with flow towards the inferior  vena cava.    Extrahepatic hepatic artery: Low resistance waveform with flow towards  the liver. 67 cm/sec with resistive index 0.76.  Right hepatic artery: 49 cm/sec with resistive index 0.62.  Left hepatic artery: 49 cm/sec with resistive index 0.67.      Impression    Impression:   1.  Patent Doppler evaluation of the transplant liver.   2.  New Ill-defined heterogeneous area in the right lobe of the liver,  suspicious for hematoma. Recommend further evaluation with CT.  3.  Trace ascites and bilateral pleural effusions.      [Urgent Result: Concern for new or growing subcapsular hematoma]    Finding was identified on 3/6/2024 12:58 AM.     Nicolasa Chen was contacted by Dr. Crabtree at 3/6/2024 7:43 AM and  verbalized understanding of the urgent finding.     I have personally reviewed the examination and initial interpretation  and I agree with the findings.    JULIO JENKINS MD         SYSTEM ID:  [id number]   XR Chest Port 1 View    Impression    RESIDENT PRELIMINARY INTERPRETATION  IMPRESSION:   1. No appreciable pneumothorax.  2. Stable support devices.  3. Low lung volumes with perihilar and bibasilar streaky opacities,  likely atelectasis.  4. Bilateral pleural effusions, slightly increased on the right.   US Liver  Transplant Follow Up    Narrative    EXAMINATION: US LIVER TRANSPLANT FOLLOW UP, 3/6/2024 9:52 AM     COMPARISON: 3/6/2024    HISTORY: Liver transplant postop day 1, evaluate for hematoma and  vessel patency    TECHNIQUE:  Gray-scale, color Doppler and spectral flow analysis.    FINDINGS:   Large infrahepatic hematoma measuring 13.3 x 13.5 x 5.4 cm with a  drain coursing through the hematoma. Small amount of fluid in the  falciform ligament.    Liver:   The liver demonstrates normal homogeneous echotexture. No  evidence of a focal hepatic mass.     Bile Ducts: Intrahepatic biliary ducts are of normal caliber. Common  bile duct is not visualized.    Gallbladder: The gallbladder is surgically absent.    Right kidney:  The right kidney demonstrates normal echotexture with  no evidence of a shadowing stone, focal mass or hydronephrosis.   12.1  cm in long axis dimension.    Pancreas: Obscured by bowel gas.    Visualized portions of the aorta are unremarkable.    LIVER DOPPLER:  Splenic vein: Not seen  Extrahepatic portal vein:  Patent continuous antegrade flow, 41  cm/sec.  Portal vein at anastomosis: Patent continuous antegrade flow, 73  cm/sec.  Intrahepatic portal vein:  Patent continuous antegrade flow, 73  cm/sec.  Right portal vein flow is antegrade, measuring 28 cm/sec.  Left portal vein flow is antegrade, measuring 20 cm/sec.    Inferior vena cava: patent with flow toward the heart throughout..  IVC above anastomosis:  116 cm/sec.  IVC at anastomosis:  99 cm/sec.  Intrahepatic IVC:  112 cm/sec.  Extrahepatic IVC:  44 cm/sec.    Right, mid, left hepatic veins: Patent with flow towards the inferior  vena cava.    Extrahepatic hepatic artery: Low resistance waveform with flow towards  the liver. 76 cm/sec with resistive index 0.68.  Left hepatic artery: 57 cm/sec with resistive index 0.65.  Right hepatic artery: 59 cm/sec with resistive index 0.61.      Impression    Impression:   1.  Normal Doppler evaluation of  the hepatic vasculature.  2.  Hematoma inferior to the right lobe of the liver, not  significantly changed compared to prior.    NIDIA COX MD         SYSTEM ID:  [id number]

## 2024-03-06 NOTE — PLAN OF CARE
Major Shift Events: Sedated, RASS -4, pupils equal and reactive. Opens eyes when propofol turned off. MAP maintained >65 with Levo, HR 70s-110s, afebrile. HR and BP responsive to blood products. CMV settings 16/400/5/50%. OG clamped, ok to use for meds. Insulin gtt in algorithm 4. Moore with less than 60mL UOP/hr. SICU notified. TIMOTHY with large amounts OP intermittently. SICU notified. Labs checked per orders and given PRBC, plt and Cryo.  Plan: Continue to monitor hemodynamic and fluid status  For vital signs and complete assessments, please see documentation flowsheets.

## 2024-03-06 NOTE — PROVIDER NOTIFICATION
Paged transplant fellow to notify that after waiting two hours to empty TIMOTHY drain (per Transplant's request), drain dumped 250mL of bloody output and immediately filled up again. Notified that we are unable to keep TIMOTHY drain to bulb suction because of this. Requested that order be updated to reflect these changes.

## 2024-03-06 NOTE — PROVIDER NOTIFICATION
"1307 paged the following message:  \"4E [id number] Mary Lopez - Pt had 265 mL bloody output from TIMOTHY. Drainage continuously dripping into TIMOTHY.   Thanks  Yue GILL [phone]\"  "

## 2024-03-06 NOTE — TELEPHONE ENCOUNTER
Medical Request form completed and faxed to [phone]. Also faxed request for medical records to [facility]

## 2024-03-06 NOTE — PROGRESS NOTES
CLINICAL NUTRITION SERVICES - BRIEF NOTE     Nutrition Prescription     RECOMMENDATIONS FOR MDs/PROVIDERS TO ORDER:  -Monitor lytes closely with TF start/advancement, will be at high risk for refeeding syndrome   -Total daily fluids/adjustments per MD     Recommendations already ordered by Registered Dietitian (RD):  Initiate TFs as below:   Use dosing weight 79 kg    EN access: NDT (pending AXR confirmation)   Regimen: TwoCal HN @ 40 mL/hr (960 mL/day) + 2 pkts Prosource TF20 to provide 2080 kcals (26 kcal/kg/day), 121 g PRO (1.5 g/kg/day), 672 mL H2O, 210 g CHO and 5 g Fiber daily.    - Do not start or advance TF rate unless Mg++ >1.5, K+ is >3, and phos >1.9  - Initiate @ 10 ml/hr and advance by 10 ml q8hr pending pt's tolerance.  - Recommend 30 ml q4hr fluid flushes for tube patency. Additional fluids and/or adjustments per MD.    - Order multivitamin/mineral (15 ml/day via FT) to help ensure micronutrient needs being met with suspected hypermetabolic demands and potential interruptions to TF infusions.   - Add additional 100 mg Thiamine x 5-7 days to prevent lyte depletion      Future/Additional Recommendations:  Upon diet advancement, start strawberry Glucerna TID with meals and calorie counts.    For last full RD assessment, see note dated 3/4/24    NEW FINDINGS   Pt underwent DDLT on 3/5/2024  Writer placed NDT, awaiting AXR confirmation of location. Once location confirmed, will start TF.    High K, Mg, Phos RN managed replacement protocols in place     INTERVENTIONS  Implementation  Collaboration with other providers  Enteral Nutrition - Initiate     Monitoring/Evaluation  Will continue to monitor and evaluate per protocol.    Kiera Ribeiro, MPH, RDN, LD  4E (SICU) RD pager: [phone] or Hilda [4E Clinical Dietitian]   Weekend/Holiday RD pager [phone]

## 2024-03-06 NOTE — PLAN OF CARE
Admitted/transferred from: OR  Reason for admission/transfer: DDLT  2 RN skin assessment: completed by Toño GARCIA RN  Result of skin assessment and interventions/actions: TIMOTHY, thu incision  Height, weight, drug calc weight: Done  Patient belongings (see Flowsheet)  MDRO education added to care planN/A  ?

## 2024-03-06 NOTE — PROGRESS NOTES
Immunosuppression Management Note:    Mary Lopez is a 53 year old male who is seen today  for immunosuppression management     I, Ryder Cortez MD, I have examined the patient with our JOSELYN/Fellow as part of a shared visit.   I participated in the rounds,  discussed and agree with the note and findings and  reviewed today's vital signs, medications, labs and imaging as noted in this note.  I  reviewed the  immunosuppression medications.  I personally provided a substantive portion of the care of this patient. I personally performed the immunosuppressive management of this patient, reviewed the overall  immunosuppression including drug levels, allograft function and provided the recommendations to adjust the dose to provide optimal levels to prevent rejection of the allograft and prevent toxicity to the organs. This was complex care due to the fresh allograft.   Time spent: evaluating patient, examining patient, discussion of plan, counseling and documentation: >35 min   I spoke to the patient/family and explained below clinical details and answered all the questions    Transplant Surgery  Inpatient Daily Progress Note  2024    Assessment & Plan: Mary Lopez is a 53 year old male with a history of EtOH cirrhosis, complicated by encephalopathy, hyponatremia, chronic thrombocytopenia, macrocytic anemia, with hospitalization (2024) for encephalopathy and suspected SBP, and recent admission -3/2 for Strep bovis bacteremia. He is now s/p a  donor liver transplant on 3/5/24 with biliary stent placement with Dr. Cortez. Pre-op MELD 31.    Graft function: Good, bilirubin 2.6 (8.7). Enzymes trending down appropriately.  Immunosuppression management: Induction with Solu-Medrol/pred taper  MMF: 750 mg BID  Tac 2 mg BID, goal 8-12.  Hematology: Acute blood loss anemia. Has received 7 units pRBCs post OR, low threshold to return to the OR for ex lap/wash  out.  Cardiorespiratory: Acute respiratory failure: Extubated in the OR, re-intubated shortly after transfer to the ICU  GI/Nutrition: NPO due to critical status  Endocrine: Type 2 diabetes mellitus with steroid hyperglycemia: Continue insulin drip at this time  Fluid/Electrolytes: MIVF: Resuscitation with blood products and albumin  : Moore to remain due to critical status  Infectious disease: Empiric antibiotic coverage with vanc/zosyn  Prophylaxis: DVT, fall, GI, fungal  Disposition: SICU    Medical Decision Making: High  Subsequent visit 76070 (high level decision making)    JOSELYN/Fellow/Resident Provider: Franchesca Aguilera PA-C     Faculty: Ryder Cortez M.D.    __________________________________________________________________  Transplant History: Admitted 3/4/2024 for  donor liver transplant.   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    3/5/2024 (Liver), Postoperative day: 1     Interval History: History is obtained from the electronic health record and patient's spouse  Overnight events: Re-intubated, receiving frequent transfusions    ROS:   A 10-point review of systems was negative except as noted above.    Curent Meds:   [Held by provider] aspirin  325 mg Oral or Feeding Tube Daily    chlorhexidine  15 mL Mouth/Throat Q12H    [START ON 3/7/2024] fluconazole  400 mg Intravenous Q24H    [START ON 3/7/2024] methylPREDNISolone  100 mg Intravenous Once    Followed by    [START ON 3/8/2024] methylPREDNISolone  50 mg Intravenous Once    Followed by    [START ON 3/9/2024] predniSONE  25 mg Oral Once    Followed by    [START ON 3/10/2024] predniSONE  10 mg Oral Once    multivitamins w/minerals  15 mL Per Feeding Tube Daily    mycophenolate  750 mg Oral BID IS    Or    mycophenolate  750 mg Oral or NG Tube BID IS    [START ON 3/13/2024] nystatin  10 mL Swish & Swallow 4x Daily    pantoprazole  40 mg Per Feeding Tube QAM AC    piperacillin-tazobactam  3.375 g Intravenous Q6H  "   [START ON 3/7/2024] polyethylene glycol  17 g Oral or Feeding Tube Daily    protein modular  1 packet Per Feeding Tube BID    senna-docusate  1-2 tablet Oral or Feeding Tube BID    sodium chloride (PF)  3 mL Intravenous Q8H    sulfamethoxazole-trimethoprim  1 tablet Oral Daily    tacrolimus  2 mg Oral BID IS    Or    tacrolimus  2 mg Oral or NG Tube BID IS    thiamine  100 mg Oral or Feeding Tube Daily    ursodiol  300 mg Oral BID    Or    ursodiol  300 mg Oral or NG Tube BID    [START ON 3/8/2024] valGANciclovir  450 mg Oral Every Other Day    Or    [START ON 3/8/2024] valGANciclovir  450 mg Oral or NG Tube Every Other Day    vancomycin place fierro - receiving intermittent dosing  1 each Intravenous See Admin Instructions       Physical Exam:     Admit Weight: 107.2 kg (236 lb 4.8 oz)    Current Vitals:   BP 98/47   Pulse 63   Temp (!) 96.3  F (35.7  C)   Resp 16   Ht 1.73 m (5' 8.11\")   Wt 111.1 kg (244 lb 14.9 oz)   SpO2 92%   BMI 37.12 kg/m      CVP (mmHg): 27 mmHg    Vital sign ranges:    Temp:  [96.3  F (35.7  C)-98.2  F (36.8  C)] 96.3  F (35.7  C)  Pulse:  [] 63  Resp:  [11-25] 16  MAP:  [59 mmHg-85 mmHg] 71 mmHg  Arterial Line BP: ()/(44-67) 98/54  FiO2 (%):  [40 %-60 %] 60 %  SpO2:  [88 %-100 %] 92 %  Patient Vitals for the past 24 hrs:   Temp Temp src Pulse Resp SpO2 Height Weight   03/06/24 1500 (!) 96.3  F (35.7  C) -- 63 -- 92 % -- --   03/06/24 1445 (!) 96.3  F (35.7  C) -- 63 -- 93 % -- --   03/06/24 1430 (!) 96.3  F (35.7  C) -- 66 -- 94 % -- --   03/06/24 1418 (!) 96.5  F (35.8  C) Axillary -- -- -- -- --   03/06/24 1415 (!) 96.3  F (35.7  C) -- 64 -- 95 % -- --   03/06/24 1400 (!) 96.6  F (35.9  C) -- 64 -- 98 % -- --   03/06/24 1345 97  F (36.1  C) -- 62 -- 97 % -- --   03/06/24 1341 -- -- 65 -- 96 % -- --   03/06/24 1330 97.3  F (36.3  C) -- 72 -- 94 % -- --   03/06/24 1315 97.5  F (36.4  C) -- 71 -- 94 % -- --   03/06/24 1300 97.5  F (36.4  C) -- 71 -- 94 % -- -- "   03/06/24 1245 97.5  F (36.4  C) -- 70 -- 93 % -- --   03/06/24 1230 97.5  F (36.4  C) -- 72 -- 93 % -- --   03/06/24 1215 97.5  F (36.4  C) -- 72 -- 95 % -- --   03/06/24 1200 97.7  F (36.5  C) Bladder 73 16 94 % -- --   03/06/24 1145 97.7  F (36.5  C) -- 74 -- 94 % -- --   03/06/24 1134 -- -- 74 -- 94 % -- --   03/06/24 1130 97.9  F (36.6  C) -- 74 -- 94 % -- --   03/06/24 1115 97.9  F (36.6  C) -- 79 -- 96 % -- --   03/06/24 1106 97.9  F (36.6  C) -- 80 -- 95 % -- --   03/06/24 1100 97.9  F (36.6  C) -- 80 -- 96 % -- --   03/06/24 1045 97.9  F (36.6  C) -- 83 -- 97 % -- --   03/06/24 1030 97.9  F (36.6  C) -- 85 -- 97 % -- --   03/06/24 1015 97.9  F (36.6  C) -- 89 -- 97 % -- --   03/06/24 1000 -- -- 82 -- 95 % -- --   03/06/24 0945 97.9  F (36.6  C) -- 83 -- 95 % -- --   03/06/24 0935 97.9  F (36.6  C) -- -- -- -- -- --   03/06/24 0930 -- -- 84 -- 97 % -- --   03/06/24 0915 -- -- 84 -- 96 % -- --   03/06/24 0900 -- -- 86 -- 96 % -- --   03/06/24 0845 -- -- 84 -- 96 % -- --   03/06/24 0830 -- -- 86 -- 97 % -- --   03/06/24 0815 -- -- 87 -- 93 % -- --   03/06/24 0800 97.9  F (36.6  C) Bladder 88 16 95 % -- --   03/06/24 0745 -- -- 90 -- 96 % -- --   03/06/24 0730 -- -- 92 -- 98 % -- --   03/06/24 0715 -- -- 91 -- 98 % -- --   03/06/24 0700 -- -- 90 -- 100 % -- --   03/06/24 0655 98.2  F (36.8  C) -- 89 -- 100 % -- --   03/06/24 0645 98.2  F (36.8  C) -- 97 -- 100 % -- --   03/06/24 0636 98.2  F (36.8  C) -- 96 -- 100 % -- --   03/06/24 0631 98.2  F (36.8  C) -- 101 -- 100 % -- --   03/06/24 0630 -- -- 102 -- 100 % -- --   03/06/24 0623 98.2  F (36.8  C) -- -- -- -- -- --   03/06/24 0621 98.2  F (36.8  C) -- -- -- -- -- --   03/06/24 0615 -- -- 113 -- 100 % -- --   03/06/24 0603 -- -- -- -- -- -- 111.1 kg (244 lb 14.9 oz)   03/06/24 0600 -- -- 113 16 100 % -- --   03/06/24 0550 -- -- 106 -- 100 % -- --   03/06/24 0545 -- -- 104 -- 99 % -- --   03/06/24 0530 -- -- 97 -- 99 % -- --   03/06/24 0515 -- -- 96 -- 98 %  -- --   03/06/24 0500 -- -- 91 16 97 % -- --   03/06/24 0445 -- -- 88 -- 97 % -- --   03/06/24 0430 -- -- 85 -- 97 % -- --   03/06/24 0415 -- -- 82 -- 97 % -- --   03/06/24 0400 98.1  F (36.7  C) Bladder 81 16 96 % -- --   03/06/24 0345 -- -- 75 -- 97 % -- --   03/06/24 0330 -- -- 75 -- 98 % -- --   03/06/24 0315 -- -- 75 -- 96 % -- --   03/06/24 0300 -- -- 74 16 96 % -- --   03/06/24 0258 97.7  F (36.5  C) -- 74 -- 97 % -- --   03/06/24 0245 -- -- 75 -- 97 % -- --   03/06/24 0230 -- -- 75 -- 96 % -- --   03/06/24 0225 97.7  F (36.5  C) -- -- -- -- -- --   03/06/24 0215 97.5  F (36.4  C) -- 76 -- 96 % -- --   03/06/24 0213 97.5  F (36.4  C) -- -- -- -- -- --   03/06/24 0200 -- -- 76 16 96 % -- --   03/06/24 0145 -- -- 80 -- 97 % -- --   03/06/24 0130 -- -- 77 -- 96 % -- --   03/06/24 0124 97.5  F (36.4  C) -- 78 -- 96 % -- --   03/06/24 0121 -- -- 79 -- 96 % -- --   03/06/24 0115 -- -- 79 -- 95 % -- --   03/06/24 0100 -- -- 78 -- 97 % -- --   03/06/24 0045 -- -- 79 -- 98 % -- --   03/06/24 0030 -- -- 79 -- 98 % -- --   03/06/24 0015 -- -- 81 -- 98 % -- --   03/06/24 0004 97.5  F (36.4  C) Bladder 82 16 99 % -- --   03/06/24 0000 -- -- 82 -- 100 % -- --   03/05/24 2356 97.5  F (36.4  C) -- 84 -- 99 % -- --   03/05/24 2346 97.5  F (36.4  C) -- 89 -- 98 % -- --   03/05/24 2345 -- -- 90 -- 97 % -- --   03/05/24 2330 -- -- 89 -- 97 % -- --   03/05/24 2315 -- -- 89 -- 97 % -- --   03/05/24 2300 -- -- 91 16 97 % -- --   03/05/24 2245 -- -- 91 -- 97 % -- --   03/05/24 2230 -- -- 90 -- 97 % -- --   03/05/24 2215 -- -- 90 -- 97 % -- --   03/05/24 2200 -- -- 89 16 97 % -- --   03/05/24 2148 -- -- 88 -- 97 % -- --   03/05/24 2145 -- -- 87 -- 97 % -- --   03/05/24 2130 -- -- 84 -- 98 % -- --   03/05/24 2115 -- -- 84 -- 98 % -- --   03/05/24 2105 97  F (36.1  C) -- 85 -- 99 % -- --   03/05/24 2100 -- -- 86 16 99 % -- --   03/05/24 2045 -- -- 87 -- 98 % -- --   03/05/24 2030 -- -- 87 -- 97 % -- --   03/05/24 2015 96.8  F (36  C)  "Bladder 86 -- 97 % -- --   03/05/24 2013 96.8  F (36  C) Bladder 86 -- 98 % -- --   03/05/24 2005 96.8  F (36  C) Bladder 88 -- 99 % -- --   03/05/24 2003 96.8  F (36  C) Bladder 88 -- 98 % -- --   03/05/24 2000 97.5  F (36.4  C) Axillary 89 16 99 % -- --   03/05/24 1945 -- -- 89 -- 98 % -- --   03/05/24 1930 -- -- 89 -- 97 % -- --   03/05/24 1845 97.5  F (36.4  C) -- -- -- -- -- --   03/05/24 1830 97.5  F (36.4  C) -- 96 -- 100 % 1.73 m (5' 8.11\") --   03/05/24 1815 97.2  F (36.2  C) -- 98 -- 100 % -- --   03/05/24 1800 -- -- 102 16 100 % -- --   03/05/24 1745 -- -- 110 -- 97 % -- --   03/05/24 1730 -- -- 112 -- 98 % -- --   03/05/24 1715 -- -- 116 -- 95 % -- --   03/05/24 1700 -- -- 119 -- 96 % -- --   03/05/24 1645 -- -- (!) 129 16 93 % -- --   03/05/24 1630 -- -- (!) 127 11 100 % -- --   03/05/24 1615 -- -- 118 25 (!) 88 % -- --   03/05/24 1600 97.2  F (36.2  C) Bladder 112 19 94 % -- --     General Appearance: Intubated, sedated  Skin: normal, warm  Heart: regular rate and rhythm  Lungs: Intubated  Abdomen: Incision covered, shadowing, drain with serosanguinous output  : Moore catheter in place  Extremities: edema: bilaterally  Neurologic: sedate    Frailty Scores          12/19/2023   Frailty Scores   Final Score Frail   Final Score Number 4       Data:   CMP  Recent Labs   Lab 03/06/24  1441 03/06/24  1425 03/06/24  1242 03/06/24  1233 03/06/24  0503 03/06/24  0500 03/04/24  1257 03/04/24  1058   NA  --   --   --  137  --  136   < > 129*   POTASSIUM  --   --   --  4.2  --  4.0   < > 4.3   CHLORIDE  --   --   --  104  --  104   < > 96*   CO2  --   --   --  25  --  25   < > 25   *  --  128* 143*   < > 154*   < > 392*   BUN  --   --   --  26.6*  --  23.6*   < > 17.5   CR  --   --   --  1.45*  --  1.11   < > 1.08   GFRESTIMATED  --   --   --  58*  --  80   < > 83   MEG  --   --   --  8.8  --  8.8   < > 8.7   ICAW  --  5.0  --   --   --  5.2   < >  --    MAG  --   --   --   --   --  1.6*  --  1.1*   PHOS  " --   --   --   --   --  3.8  --  2.3*   AMYLASE  --   --   --   --   --  22*  --  10*   LIPASE  --   --   --   --   --  5*  --   --    ALBUMIN  --   --   --  2.4*  --  2.3*   < > 2.6*   BILITOTAL  --   --   --  1.6*  --  2.6*   < > 12.7*   ALKPHOS  --   --   --  40  --  47   < > 181*   AST  --   --   --  156*  --  310*   < >  --    ALT  --   --   --  183*  --  294*   < > 50    < > = values in this interval not displayed.     CBC  Recent Labs   Lab 03/06/24  1233 03/06/24  0816   HGB 6.2* 8.3*   WBC 11.3* 16.1*   PLT 79* 149*     COAGS  Recent Labs   Lab 03/06/24  1233 03/06/24  0816 03/05/24  2338 03/05/24  1609   INR 1.59* 1.57*   < > 2.12*   PTT  --  30  --  87*    < > = values in this interval not displayed.      Urinalysis  Recent Labs   Lab Test 03/04/24  1042 02/23/24  1558   COLOR Yellow Yellow   APPEARANCE Clear Clear   URINEGLC 500* Negative   URINEBILI Negative Small*   URINEKETONE Negative Negative   SG 1.007 1.009   UBLD Negative Negative   URINEPH 5.0 5.0   PROTEIN Negative Negative   NITRITE Negative Negative   LEUKEST Negative Negative   RBCU <1 <1   WBCU <1 <1     Virology:  Hepatitis C Antibody   Date Value Ref Range Status   03/04/2024 Nonreactive Nonreactive Final     Comment:     A nonreactive screening test result does not exclude the possibility of exposure to or infection with HCV. Nonreactive screening test results in individuals with prior exposure to HCV may be due to antibody levels below the limit of detection of this assay or lack of reactivity to the HCV antigens used in this assay. Patients with recent HCV infections (<3 months from time of exposure) may have false-negative HCV antibody results due to the time needed for seroconversion (average of 8 to 9 weeks).

## 2024-03-06 NOTE — PROVIDER NOTIFICATION
Notified SICU provider that patient's hgb is 7.8 and platelets are 49. Provider said they are putting in orders.

## 2024-03-06 NOTE — PROGRESS NOTES
Transplant Social Work Services Progress Note      Date of Initial Social Work Evaluation: 2023  Collaborated with: Liver Transplant Team, Lore Mckeon-outpatient transplant coordinator    Data: Mary received a  donor liver transplant yesterday.  Intervention: I met with Mary's wife Adina and provided supportive counseling.  Assessment: Adina is coping appropriately.  She reports sleeping well last night and is attending to her self care.  Adina is working remotely at patient's bedside, and has flexibility to take time off work as needed.  Education provided by SW: Pharmacy test claim for immunosuppression and valganciclovir  Plan:    Discharge Plans in Progress: Awaiting PT/OT evaluations and progress, TCU/ARU discussed with patient's wife on admission    Barriers to d/c plan: medical stability    Follow up Plan: I am off tomorrow and all of next week.  Bridgette Stewart [facility] will cover in my absence.        CAPRI Brock, [facility]  [facility] Transplant   Phone [phone]

## 2024-03-07 ENCOUNTER — APPOINTMENT (OUTPATIENT)
Dept: GENERAL RADIOLOGY | Facility: CLINIC | Age: 54
DRG: 005 | End: 2024-03-07
Payer: COMMERCIAL

## 2024-03-07 LAB
ALBUMIN SERPL BCG-MCNC: 2.8 G/DL (ref 3.5–5.2)
ALBUMIN SERPL BCG-MCNC: 3.1 G/DL (ref 3.5–5.2)
ALLEN'S TEST: ABNORMAL
ALP SERPL-CCNC: 56 U/L (ref 40–150)
ALT SERPL W P-5'-P-CCNC: 152 U/L (ref 0–70)
ANION GAP SERPL CALCULATED.3IONS-SCNC: 11 MMOL/L (ref 7–15)
ANION GAP SERPL CALCULATED.3IONS-SCNC: 11 MMOL/L (ref 7–15)
ANION GAP SERPL CALCULATED.3IONS-SCNC: 12 MMOL/L (ref 7–15)
AST SERPL W P-5'-P-CCNC: 109 U/L (ref 0–45)
BASE EXCESS BLDA CALC-SCNC: 1.4 MMOL/L (ref -3–3)
BASOPHILS # BLD AUTO: 0 10E3/UL (ref 0–0.2)
BASOPHILS NFR BLD AUTO: 0 %
BILIRUB DIRECT SERPL-MCNC: 0.98 MG/DL (ref 0–0.3)
BILIRUB SERPL-MCNC: 1.5 MG/DL
BLD PROD TYP BPU: NORMAL
BLD PROD TYP BPU: NORMAL
BLOOD COMPONENT TYPE: NORMAL
BLOOD COMPONENT TYPE: NORMAL
BUN SERPL-MCNC: 37.6 MG/DL (ref 6–20)
BUN SERPL-MCNC: 39.4 MG/DL (ref 6–20)
BUN SERPL-MCNC: 45.3 MG/DL (ref 6–20)
CA-I BLD-MCNC: 4.7 MG/DL (ref 4.4–5.2)
CA-I BLD-MCNC: 5 MG/DL (ref 4.4–5.2)
CALCIUM SERPL-MCNC: 8.3 MG/DL (ref 8.6–10)
CALCIUM SERPL-MCNC: 8.4 MG/DL (ref 8.6–10)
CALCIUM SERPL-MCNC: 8.8 MG/DL (ref 8.6–10)
CHLORIDE SERPL-SCNC: 103 MMOL/L (ref 98–107)
CHLORIDE SERPL-SCNC: 103 MMOL/L (ref 98–107)
CHLORIDE SERPL-SCNC: 104 MMOL/L (ref 98–107)
CODING SYSTEM: NORMAL
CODING SYSTEM: NORMAL
COHGB MFR BLD: 99.1 % (ref 96–97)
CREAT SERPL-MCNC: 1.98 MG/DL (ref 0.67–1.17)
CREAT SERPL-MCNC: 2.06 MG/DL (ref 0.67–1.17)
CREAT SERPL-MCNC: 2.35 MG/DL (ref 0.67–1.17)
DEPRECATED HCO3 PLAS-SCNC: 22 MMOL/L (ref 22–29)
DEPRECATED HCO3 PLAS-SCNC: 24 MMOL/L (ref 22–29)
DEPRECATED HCO3 PLAS-SCNC: 24 MMOL/L (ref 22–29)
EGFRCR SERPLBLD CKD-EPI 2021: 32 ML/MIN/1.73M2
EGFRCR SERPLBLD CKD-EPI 2021: 38 ML/MIN/1.73M2
EGFRCR SERPLBLD CKD-EPI 2021: 40 ML/MIN/1.73M2
EOSINOPHIL # BLD AUTO: 0 10E3/UL (ref 0–0.7)
EOSINOPHIL NFR BLD AUTO: 0 %
ERYTHROCYTE [DISTWIDTH] IN BLOOD BY AUTOMATED COUNT: 15.6 % (ref 10–15)
ERYTHROCYTE [DISTWIDTH] IN BLOOD BY AUTOMATED COUNT: 15.9 % (ref 10–15)
ERYTHROCYTE [DISTWIDTH] IN BLOOD BY AUTOMATED COUNT: 16.6 % (ref 10–15)
FIBRINOGEN PPP-MCNC: 253 MG/DL (ref 170–490)
GLUCOSE BLDC GLUCOMTR-MCNC: 128 MG/DL (ref 70–99)
GLUCOSE BLDC GLUCOMTR-MCNC: 138 MG/DL (ref 70–99)
GLUCOSE BLDC GLUCOMTR-MCNC: 139 MG/DL (ref 70–99)
GLUCOSE BLDC GLUCOMTR-MCNC: 141 MG/DL (ref 70–99)
GLUCOSE BLDC GLUCOMTR-MCNC: 143 MG/DL (ref 70–99)
GLUCOSE BLDC GLUCOMTR-MCNC: 143 MG/DL (ref 70–99)
GLUCOSE BLDC GLUCOMTR-MCNC: 145 MG/DL (ref 70–99)
GLUCOSE BLDC GLUCOMTR-MCNC: 152 MG/DL (ref 70–99)
GLUCOSE BLDC GLUCOMTR-MCNC: 156 MG/DL (ref 70–99)
GLUCOSE BLDC GLUCOMTR-MCNC: 158 MG/DL (ref 70–99)
GLUCOSE BLDC GLUCOMTR-MCNC: 159 MG/DL (ref 70–99)
GLUCOSE BLDC GLUCOMTR-MCNC: 165 MG/DL (ref 70–99)
GLUCOSE BLDC GLUCOMTR-MCNC: 166 MG/DL (ref 70–99)
GLUCOSE BLDC GLUCOMTR-MCNC: 178 MG/DL (ref 70–99)
GLUCOSE BLDC GLUCOMTR-MCNC: 186 MG/DL (ref 70–99)
GLUCOSE SERPL-MCNC: 139 MG/DL (ref 70–99)
GLUCOSE SERPL-MCNC: 159 MG/DL (ref 70–99)
GLUCOSE SERPL-MCNC: 178 MG/DL (ref 70–99)
HCO3 BLD-SCNC: 25 MMOL/L (ref 21–28)
HCT VFR BLD AUTO: 22.3 % (ref 40–53)
HCT VFR BLD AUTO: 22.9 % (ref 40–53)
HCT VFR BLD AUTO: 23 % (ref 40–53)
HGB BLD-MCNC: 7.9 G/DL (ref 13.3–17.7)
HGB BLD-MCNC: 7.9 G/DL (ref 13.3–17.7)
HGB BLD-MCNC: 8.2 G/DL (ref 13.3–17.7)
IMM GRANULOCYTES # BLD: 0.1 10E3/UL
IMM GRANULOCYTES NFR BLD: 1 %
INR PPP: 1.28 (ref 0.85–1.15)
ISSUE DATE AND TIME: NORMAL
ISSUE DATE AND TIME: NORMAL
LYMPHOCYTES # BLD AUTO: 0.2 10E3/UL (ref 0.8–5.3)
LYMPHOCYTES NFR BLD AUTO: 3 %
MAGNESIUM SERPL-MCNC: 2.2 MG/DL (ref 1.7–2.3)
MAGNESIUM SERPL-MCNC: 2.3 MG/DL (ref 1.7–2.3)
MAGNESIUM SERPL-MCNC: 2.4 MG/DL (ref 1.7–2.3)
MCH RBC QN AUTO: 29.8 PG (ref 26.5–33)
MCH RBC QN AUTO: 30.2 PG (ref 26.5–33)
MCH RBC QN AUTO: 30.3 PG (ref 26.5–33)
MCHC RBC AUTO-ENTMCNC: 34.5 G/DL (ref 31.5–36.5)
MCHC RBC AUTO-ENTMCNC: 35.4 G/DL (ref 31.5–36.5)
MCHC RBC AUTO-ENTMCNC: 35.7 G/DL (ref 31.5–36.5)
MCV RBC AUTO: 85 FL (ref 78–100)
MCV RBC AUTO: 85 FL (ref 78–100)
MCV RBC AUTO: 86 FL (ref 78–100)
MONOCYTES # BLD AUTO: 0.6 10E3/UL (ref 0–1.3)
MONOCYTES NFR BLD AUTO: 7 %
NEUTROPHILS # BLD AUTO: 8.1 10E3/UL (ref 1.6–8.3)
NEUTROPHILS NFR BLD AUTO: 89 %
NRBC # BLD AUTO: 0 10E3/UL
NRBC BLD AUTO-RTO: 0 /100
O2/TOTAL GAS SETTING VFR VENT: 50 %
PCO2 BLD: 37 MM HG (ref 35–45)
PH BLD: 7.45 [PH] (ref 7.35–7.45)
PHOSPHATE SERPL-MCNC: 5.4 MG/DL (ref 2.5–4.5)
PHOSPHATE SERPL-MCNC: 5.7 MG/DL (ref 2.5–4.5)
PHOSPHATE SERPL-MCNC: 5.7 MG/DL (ref 2.5–4.5)
PLATELET # BLD AUTO: 48 10E3/UL (ref 150–450)
PLATELET # BLD AUTO: 55 10E3/UL (ref 150–450)
PLATELET # BLD AUTO: 65 10E3/UL (ref 150–450)
PO2 BLD: 103 MM HG (ref 80–105)
POTASSIUM SERPL-SCNC: 4.2 MMOL/L (ref 3.4–5.3)
POTASSIUM SERPL-SCNC: 4.2 MMOL/L (ref 3.4–5.3)
POTASSIUM SERPL-SCNC: 4.3 MMOL/L (ref 3.4–5.3)
PROT SERPL-MCNC: 4.5 G/DL (ref 6.4–8.3)
RBC # BLD AUTO: 2.62 10E6/UL (ref 4.4–5.9)
RBC # BLD AUTO: 2.65 10E6/UL (ref 4.4–5.9)
RBC # BLD AUTO: 2.71 10E6/UL (ref 4.4–5.9)
SAO2 % BLDA: 97 % (ref 92–100)
SODIUM SERPL-SCNC: 138 MMOL/L (ref 135–145)
TACROLIMUS BLD-MCNC: 2.5 UG/L (ref 5–15)
TME LAST DOSE: ABNORMAL H
TME LAST DOSE: ABNORMAL H
UNIT ABO/RH: NORMAL
UNIT ABO/RH: NORMAL
UNIT NUMBER: NORMAL
UNIT NUMBER: NORMAL
UNIT STATUS: NORMAL
UNIT STATUS: NORMAL
UNIT TYPE ISBT: 5100
UNIT TYPE ISBT: 6200
VANCOMYCIN SERPL-MCNC: 19.5 UG/ML
WBC # BLD AUTO: 8.3 10E3/UL (ref 4–11)
WBC # BLD AUTO: 8.8 10E3/UL (ref 4–11)
WBC # BLD AUTO: 9 10E3/UL (ref 4–11)

## 2024-03-07 PROCEDURE — 85025 COMPLETE CBC W/AUTO DIFF WBC: CPT | Performed by: STUDENT IN AN ORGANIZED HEALTH CARE EDUCATION/TRAINING PROGRAM

## 2024-03-07 PROCEDURE — 250N000009 HC RX 250: Performed by: STUDENT IN AN ORGANIZED HEALTH CARE EDUCATION/TRAINING PROGRAM

## 2024-03-07 PROCEDURE — 250N000009 HC RX 250

## 2024-03-07 PROCEDURE — 5A1D90Z PERFORMANCE OF URINARY FILTRATION, CONTINUOUS, GREATER THAN 18 HOURS PER DAY: ICD-10-PCS | Performed by: INTERNAL MEDICINE

## 2024-03-07 PROCEDURE — 82330 ASSAY OF CALCIUM: CPT

## 2024-03-07 PROCEDURE — 84100 ASSAY OF PHOSPHORUS: CPT | Performed by: TRANSPLANT SURGERY

## 2024-03-07 PROCEDURE — 94003 VENT MGMT INPAT SUBQ DAY: CPT

## 2024-03-07 PROCEDURE — 80202 ASSAY OF VANCOMYCIN: CPT | Performed by: TRANSPLANT SURGERY

## 2024-03-07 PROCEDURE — 99207 PR NO BILLABLE SERVICE THIS VISIT: CPT | Performed by: TRANSPLANT SURGERY

## 2024-03-07 PROCEDURE — 83735 ASSAY OF MAGNESIUM: CPT | Performed by: TRANSPLANT SURGERY

## 2024-03-07 PROCEDURE — 36556 INSERT NON-TUNNEL CV CATH: CPT | Mod: GC | Performed by: SURGERY

## 2024-03-07 PROCEDURE — 999N000157 HC STATISTIC RCP TIME EA 10 MIN

## 2024-03-07 PROCEDURE — 999N000065 XR CHEST PORT 1 VIEW

## 2024-03-07 PROCEDURE — P9037 PLATE PHERES LEUKOREDU IRRAD: HCPCS

## 2024-03-07 PROCEDURE — 71045 X-RAY EXAM CHEST 1 VIEW: CPT

## 2024-03-07 PROCEDURE — 250N000011 HC RX IP 250 OP 636: Performed by: TRANSPLANT SURGERY

## 2024-03-07 PROCEDURE — 85027 COMPLETE CBC AUTOMATED: CPT

## 2024-03-07 PROCEDURE — 80197 ASSAY OF TACROLIMUS: CPT | Performed by: STUDENT IN AN ORGANIZED HEALTH CARE EDUCATION/TRAINING PROGRAM

## 2024-03-07 PROCEDURE — 3E043XZ INTRODUCTION OF VASOPRESSOR INTO CENTRAL VEIN, PERCUTANEOUS APPROACH: ICD-10-PCS | Performed by: TRANSPLANT SURGERY

## 2024-03-07 PROCEDURE — 250N000011 HC RX IP 250 OP 636: Mod: JZ | Performed by: STUDENT IN AN ORGANIZED HEALTH CARE EDUCATION/TRAINING PROGRAM

## 2024-03-07 PROCEDURE — 83735 ASSAY OF MAGNESIUM: CPT

## 2024-03-07 PROCEDURE — 250N000011 HC RX IP 250 OP 636

## 2024-03-07 PROCEDURE — 999N000253 HC STATISTIC WEANING TRIALS

## 2024-03-07 PROCEDURE — 250N000013 HC RX MED GY IP 250 OP 250 PS 637

## 2024-03-07 PROCEDURE — 250N000009 HC RX 250: Performed by: PHYSICIAN ASSISTANT

## 2024-03-07 PROCEDURE — 71045 X-RAY EXAM CHEST 1 VIEW: CPT | Mod: 26 | Performed by: RADIOLOGY

## 2024-03-07 PROCEDURE — 82805 BLOOD GASES W/O2 SATURATION: CPT | Performed by: SURGERY

## 2024-03-07 PROCEDURE — 05H433Z INSERTION OF INFUSION DEVICE INTO LEFT INNOMINATE VEIN, PERCUTANEOUS APPROACH: ICD-10-PCS | Performed by: SURGERY

## 2024-03-07 PROCEDURE — 82330 ASSAY OF CALCIUM: CPT | Performed by: STUDENT IN AN ORGANIZED HEALTH CARE EDUCATION/TRAINING PROGRAM

## 2024-03-07 PROCEDURE — 84100 ASSAY OF PHOSPHORUS: CPT

## 2024-03-07 PROCEDURE — 250N000013 HC RX MED GY IP 250 OP 250 PS 637: Performed by: STUDENT IN AN ORGANIZED HEALTH CARE EDUCATION/TRAINING PROGRAM

## 2024-03-07 PROCEDURE — 99223 1ST HOSP IP/OBS HIGH 75: CPT | Mod: FS

## 2024-03-07 PROCEDURE — 99291 CRITICAL CARE FIRST HOUR: CPT | Mod: 24 | Performed by: SURGERY

## 2024-03-07 PROCEDURE — 85384 FIBRINOGEN ACTIVITY: CPT | Performed by: STUDENT IN AN ORGANIZED HEALTH CARE EDUCATION/TRAINING PROGRAM

## 2024-03-07 PROCEDURE — 80053 COMPREHEN METABOLIC PANEL: CPT | Performed by: STUDENT IN AN ORGANIZED HEALTH CARE EDUCATION/TRAINING PROGRAM

## 2024-03-07 PROCEDURE — 250N000013 HC RX MED GY IP 250 OP 250 PS 637: Performed by: PHYSICIAN ASSISTANT

## 2024-03-07 PROCEDURE — 85610 PROTHROMBIN TIME: CPT | Performed by: STUDENT IN AN ORGANIZED HEALTH CARE EDUCATION/TRAINING PROGRAM

## 2024-03-07 PROCEDURE — 250N000011 HC RX IP 250 OP 636: Performed by: STUDENT IN AN ORGANIZED HEALTH CARE EDUCATION/TRAINING PROGRAM

## 2024-03-07 PROCEDURE — 82310 ASSAY OF CALCIUM: CPT

## 2024-03-07 PROCEDURE — 200N000002 HC R&B ICU UMMC

## 2024-03-07 PROCEDURE — 250N000012 HC RX MED GY IP 250 OP 636 PS 637: Performed by: STUDENT IN AN ORGANIZED HEALTH CARE EDUCATION/TRAINING PROGRAM

## 2024-03-07 PROCEDURE — 90947 DIALYSIS REPEATED EVAL: CPT

## 2024-03-07 PROCEDURE — P9045 ALBUMIN (HUMAN), 5%, 250 ML: HCPCS | Mod: JZ | Performed by: STUDENT IN AN ORGANIZED HEALTH CARE EDUCATION/TRAINING PROGRAM

## 2024-03-07 PROCEDURE — 250N000013 HC RX MED GY IP 250 OP 250 PS 637: Performed by: TRANSPLANT SURGERY

## 2024-03-07 RX ORDER — SULFAMETHOXAZOLE AND TRIMETHOPRIM 400; 80 MG/1; MG/1
1 TABLET ORAL EVERY EVENING
Status: DISCONTINUED | OUTPATIENT
Start: 2024-03-07 | End: 2024-03-21 | Stop reason: HOSPADM

## 2024-03-07 RX ORDER — POLYETHYLENE GLYCOL 3350 17 G/17G
17 POWDER, FOR SOLUTION ORAL 2 TIMES DAILY
Status: DISCONTINUED | OUTPATIENT
Start: 2024-03-07 | End: 2024-03-09

## 2024-03-07 RX ORDER — FUROSEMIDE 10 MG/ML
120 INJECTION INTRAMUSCULAR; INTRAVENOUS ONCE
Qty: 12 ML | Refills: 0 | Status: COMPLETED | OUTPATIENT
Start: 2024-03-07 | End: 2024-03-07

## 2024-03-07 RX ORDER — POTASSIUM CHLORIDE 29.8 MG/ML
20 INJECTION INTRAVENOUS EVERY 8 HOURS PRN
Status: DISCONTINUED | OUTPATIENT
Start: 2024-03-07 | End: 2024-03-09

## 2024-03-07 RX ORDER — VALGANCICLOVIR 450 MG/1
450 TABLET, FILM COATED ORAL EVERY EVENING
Status: DISCONTINUED | OUTPATIENT
Start: 2024-03-07 | End: 2024-03-13

## 2024-03-07 RX ORDER — FLUCONAZOLE 2 MG/ML
200 INJECTION, SOLUTION INTRAVENOUS EVERY 24 HOURS
Status: DISCONTINUED | OUTPATIENT
Start: 2024-03-07 | End: 2024-03-07

## 2024-03-07 RX ORDER — CALCIUM CHLORIDE, MAGNESIUM CHLORIDE, SODIUM CHLORIDE, SODIUM BICARBONATE, POTASSIUM CHLORIDE AND SODIUM PHOSPHATE DIBASIC DIHYDRATE 3.68; 3.05; 6.34; 3.09; .314; .187 G/L; G/L; G/L; G/L; G/L; G/L
12.5 INJECTION INTRAVENOUS CONTINUOUS
Status: DISCONTINUED | OUTPATIENT
Start: 2024-03-07 | End: 2024-03-09

## 2024-03-07 RX ORDER — VALGANCICLOVIR HYDROCHLORIDE 50 MG/ML
450 POWDER, FOR SOLUTION ORAL EVERY EVENING
Status: DISCONTINUED | OUTPATIENT
Start: 2024-03-07 | End: 2024-03-11

## 2024-03-07 RX ORDER — CALCIUM CHLORIDE, MAGNESIUM CHLORIDE, SODIUM CHLORIDE, SODIUM BICARBONATE, POTASSIUM CHLORIDE AND SODIUM PHOSPHATE DIBASIC DIHYDRATE 3.68; 3.05; 6.34; 3.09; .314; .187 G/L; G/L; G/L; G/L; G/L; G/L
INJECTION INTRAVENOUS CONTINUOUS
Status: DISCONTINUED | OUTPATIENT
Start: 2024-03-07 | End: 2024-03-09

## 2024-03-07 RX ORDER — FLUCONAZOLE 2 MG/ML
400 INJECTION, SOLUTION INTRAVENOUS EVERY 24 HOURS
Status: DISCONTINUED | OUTPATIENT
Start: 2024-03-08 | End: 2024-03-08

## 2024-03-07 RX ORDER — CALCIUM GLUCONATE 20 MG/ML
2 INJECTION, SOLUTION INTRAVENOUS EVERY 8 HOURS PRN
Status: DISCONTINUED | OUTPATIENT
Start: 2024-03-07 | End: 2024-03-09

## 2024-03-07 RX ORDER — MAGNESIUM SULFATE HEPTAHYDRATE 40 MG/ML
2 INJECTION, SOLUTION INTRAVENOUS EVERY 8 HOURS PRN
Status: DISCONTINUED | OUTPATIENT
Start: 2024-03-07 | End: 2024-03-09

## 2024-03-07 RX ADMIN — SULFAMETHOXAZOLE AND TRIMETHOPRIM 1 TABLET: 400; 80 TABLET ORAL at 20:14

## 2024-03-07 RX ADMIN — METHYLPREDNISOLONE SODIUM SUCCINATE 100 MG: 125 INJECTION, POWDER, FOR SOLUTION INTRAMUSCULAR; INTRAVENOUS at 08:19

## 2024-03-07 RX ADMIN — ALBUMIN (HUMAN) 25 G: 12.5 SOLUTION INTRAVENOUS at 04:30

## 2024-03-07 RX ADMIN — VALGANCICLOVIR 450 MG: 450 TABLET, FILM COATED ORAL at 20:13

## 2024-03-07 RX ADMIN — URSODIOL 300 MG: 300 CAPSULE ORAL at 08:22

## 2024-03-07 RX ADMIN — DOCUSATE SODIUM 50 MG AND SENNOSIDES 8.6 MG 1 TABLET: 8.6; 5 TABLET, FILM COATED ORAL at 20:14

## 2024-03-07 RX ADMIN — Medication 1 PACKET: at 08:24

## 2024-03-07 RX ADMIN — Medication 100 MCG/HR: at 20:17

## 2024-03-07 RX ADMIN — PIPERACILLIN AND TAZOBACTAM 3.38 G: 3; .375 INJECTION, POWDER, FOR SOLUTION INTRAVENOUS at 20:12

## 2024-03-07 RX ADMIN — MYCOPHENOLATE MOFETIL 750 MG: 200 POWDER, FOR SUSPENSION ORAL at 08:23

## 2024-03-07 RX ADMIN — CHLORHEXIDINE GLUCONATE 15 ML: 1.2 SOLUTION ORAL at 08:20

## 2024-03-07 RX ADMIN — DOCUSATE SODIUM 50 MG AND SENNOSIDES 8.6 MG 1 TABLET: 8.6; 5 TABLET, FILM COATED ORAL at 08:20

## 2024-03-07 RX ADMIN — THIAMINE HCL TAB 100 MG 100 MG: 100 TAB at 08:20

## 2024-03-07 RX ADMIN — OXYCODONE HYDROCHLORIDE 10 MG: 10 TABLET ORAL at 23:55

## 2024-03-07 RX ADMIN — CALCIUM CHLORIDE, MAGNESIUM CHLORIDE, SODIUM CHLORIDE, SODIUM BICARBONATE, POTASSIUM CHLORIDE AND SODIUM PHOSPHATE DIBASIC DIHYDRATE 12.5 ML/KG/HR: 3.68; 3.05; 6.34; 3.09; .314; .187 INJECTION INTRAVENOUS at 18:20

## 2024-03-07 RX ADMIN — INSULIN HUMAN 6 UNITS/HR: 1 INJECTION, SOLUTION INTRAVENOUS at 16:15

## 2024-03-07 RX ADMIN — PIPERACILLIN AND TAZOBACTAM 3.38 G: 3; .375 INJECTION, POWDER, FOR SOLUTION INTRAVENOUS at 08:23

## 2024-03-07 RX ADMIN — CALCIUM CHLORIDE, MAGNESIUM CHLORIDE, SODIUM CHLORIDE, SODIUM BICARBONATE, POTASSIUM CHLORIDE AND SODIUM PHOSPHATE DIBASIC DIHYDRATE 12.5 ML/KG/HR: 3.68; 3.05; 6.34; 3.09; .314; .187 INJECTION INTRAVENOUS at 14:49

## 2024-03-07 RX ADMIN — Medication 15 ML: at 11:52

## 2024-03-07 RX ADMIN — TACROLIMUS 2 MG: 5 CAPSULE ORAL at 18:10

## 2024-03-07 RX ADMIN — CALCIUM CHLORIDE, MAGNESIUM CHLORIDE, SODIUM CHLORIDE, SODIUM BICARBONATE, POTASSIUM CHLORIDE AND SODIUM PHOSPHATE DIBASIC DIHYDRATE 12.5 ML/KG/HR: 3.68; 3.05; 6.34; 3.09; .314; .187 INJECTION INTRAVENOUS at 18:21

## 2024-03-07 RX ADMIN — DEXMEDETOMIDINE HYDROCHLORIDE 0.9 MCG/KG/HR: 400 INJECTION INTRAVENOUS at 18:20

## 2024-03-07 RX ADMIN — INSULIN HUMAN 4 UNITS/HR: 1 INJECTION, SOLUTION INTRAVENOUS at 08:02

## 2024-03-07 RX ADMIN — DEXMEDETOMIDINE HYDROCHLORIDE 0.9 MCG/KG/HR: 400 INJECTION INTRAVENOUS at 16:26

## 2024-03-07 RX ADMIN — ALBUMIN (HUMAN) 25 G: 12.5 SOLUTION INTRAVENOUS at 04:24

## 2024-03-07 RX ADMIN — Medication 40 MG: at 08:19

## 2024-03-07 RX ADMIN — PIPERACILLIN AND TAZOBACTAM 3.38 G: 3; .375 INJECTION, POWDER, FOR SOLUTION INTRAVENOUS at 14:35

## 2024-03-07 RX ADMIN — Medication 1 PACKET: at 20:14

## 2024-03-07 RX ADMIN — MYCOPHENOLATE MOFETIL 750 MG: 200 POWDER, FOR SUSPENSION ORAL at 18:10

## 2024-03-07 RX ADMIN — DEXMEDETOMIDINE HYDROCHLORIDE 0.7 MCG/KG/HR: 400 INJECTION INTRAVENOUS at 08:00

## 2024-03-07 RX ADMIN — POLYETHYLENE GLYCOL 3350 17 G: 17 POWDER, FOR SOLUTION ORAL at 20:13

## 2024-03-07 RX ADMIN — POLYETHYLENE GLYCOL 3350 17 G: 17 POWDER, FOR SOLUTION ORAL at 08:20

## 2024-03-07 RX ADMIN — Medication 50 MCG: at 12:05

## 2024-03-07 RX ADMIN — FLUCONAZOLE 200 MG: 2 INJECTION, SOLUTION INTRAVENOUS at 10:25

## 2024-03-07 RX ADMIN — PIPERACILLIN AND TAZOBACTAM 3.38 G: 3; .375 INJECTION, POWDER, FOR SOLUTION INTRAVENOUS at 01:49

## 2024-03-07 RX ADMIN — DEXMEDETOMIDINE HYDROCHLORIDE 0.7 MCG/KG/HR: 400 INJECTION INTRAVENOUS at 03:45

## 2024-03-07 RX ADMIN — FUROSEMIDE 120 MG: 10 INJECTION, SOLUTION INTRAVENOUS at 08:03

## 2024-03-07 RX ADMIN — CALCIUM CHLORIDE, MAGNESIUM CHLORIDE, SODIUM CHLORIDE, SODIUM BICARBONATE, POTASSIUM CHLORIDE AND SODIUM PHOSPHATE DIBASIC DIHYDRATE 12.5 ML/KG/HR: 3.68; 3.05; 6.34; 3.09; .314; .187 INJECTION INTRAVENOUS at 21:46

## 2024-03-07 RX ADMIN — TACROLIMUS 2 MG: 5 CAPSULE ORAL at 08:22

## 2024-03-07 RX ADMIN — CHLORHEXIDINE GLUCONATE 15 ML: 1.2 SOLUTION ORAL at 20:13

## 2024-03-07 RX ADMIN — Medication 100 MCG/HR: at 01:39

## 2024-03-07 RX ADMIN — CALCIUM CHLORIDE, MAGNESIUM CHLORIDE, SODIUM CHLORIDE, SODIUM BICARBONATE, POTASSIUM CHLORIDE AND SODIUM PHOSPHATE DIBASIC DIHYDRATE: 3.68; 3.05; 6.34; 3.09; .314; .187 INJECTION INTRAVENOUS at 14:49

## 2024-03-07 RX ADMIN — DEXMEDETOMIDINE HYDROCHLORIDE 1.2 MCG/KG/HR: 400 INJECTION INTRAVENOUS at 22:08

## 2024-03-07 RX ADMIN — URSODIOL 300 MG: 300 CAPSULE ORAL at 20:14

## 2024-03-07 RX ADMIN — CALCIUM CHLORIDE, MAGNESIUM CHLORIDE, SODIUM CHLORIDE, SODIUM BICARBONATE, POTASSIUM CHLORIDE AND SODIUM PHOSPHATE DIBASIC DIHYDRATE 12.5 ML/KG/HR: 3.68; 3.05; 6.34; 3.09; .314; .187 INJECTION INTRAVENOUS at 21:47

## 2024-03-07 ASSESSMENT — ACTIVITIES OF DAILY LIVING (ADL)
ADLS_ACUITY_SCORE: 40
ADLS_ACUITY_SCORE: 40
WALKING_OR_CLIMBING_STAIRS: AMBULATION DIFFICULTY, REQUIRES EQUIPMENT
ADLS_ACUITY_SCORE: 40
CHANGE_IN_FUNCTIONAL_STATUS_SINCE_ONSET_OF_CURRENT_ILLNESS/INJURY: YES
ADLS_ACUITY_SCORE: 51
ADLS_ACUITY_SCORE: 50
ADLS_ACUITY_SCORE: 48
TOILETING_ISSUES: NO
ADLS_ACUITY_SCORE: 40
ADLS_ACUITY_SCORE: 40
CONCENTRATING,_REMEMBERING_OR_MAKING_DECISIONS_DIFFICULTY: NO
ADLS_ACUITY_SCORE: 50
NUMBER_OF_TIMES_PATIENT_HAS_FALLEN_WITHIN_LAST_SIX_MONTHS: 2
ADLS_ACUITY_SCORE: 53
ADLS_ACUITY_SCORE: 48
DOING_ERRANDS_INDEPENDENTLY_DIFFICULTY: NO
FALL_HISTORY_WITHIN_LAST_SIX_MONTHS: YES
DRESSING/BATHING_DIFFICULTY: NO
ADLS_ACUITY_SCORE: 48
ADLS_ACUITY_SCORE: 34
DIFFICULTY_EATING/SWALLOWING: NO
ADLS_ACUITY_SCORE: 53
ADLS_ACUITY_SCORE: 34
ADLS_ACUITY_SCORE: 51
ADLS_ACUITY_SCORE: 50
ADLS_ACUITY_SCORE: 40
WALKING_OR_CLIMBING_STAIRS_DIFFICULTY: NO
ADLS_ACUITY_SCORE: 50
ADLS_ACUITY_SCORE: 48
ADLS_ACUITY_SCORE: 50
ADLS_ACUITY_SCORE: 40
ADLS_ACUITY_SCORE: 51
WEAR_GLASSES_OR_BLIND: NO

## 2024-03-07 NOTE — PROGRESS NOTES
Immunosuppression Management Note:    Mary Lopez is a 53 year old male who is seen today  for immunosuppression management     I, Ryder Cortez MD, I have examined the patient with our JOSELYN/Fellow as part of a shared visit.   I participated in the rounds,  discussed and agree with the note and findings and  reviewed today's vital signs, medications, labs and imaging as noted in this note.  I  reviewed the  immunosuppression medications.  I personally provided a substantive portion of the care of this patient. I personally performed the immunosuppressive management of this patient, reviewed the overall  immunosuppression including drug levels, allograft function and provided the recommendations to adjust the dose to provide optimal levels to prevent rejection of the allograft and prevent toxicity to the organs. This was complex care due to the fresh allograft.   Time spent: evaluating patient, examining patient, discussion of plan, counseling and documentation: >35 min   I spoke to the patient/family and explained below clinical details and answered all the questions    Transplant Surgery  Inpatient Daily Progress Note  2024    Assessment & Plan: Mary Lopez is a 53 year old male with a history of EtOH cirrhosis, complicated by encephalopathy, hyponatremia, chronic thrombocytopenia, macrocytic anemia, with hospitalization (2024) for encephalopathy and suspected SBP, and recent admission -3/2 for Strep bovis bacteremia. He is now s/p a  donor liver transplant on 3/5/24 with biliary stent placement with Dr. Cortez. Pre-op MELD 31.    Graft function: Good, bilirubin 1.5. Enzymes trending down appropriately.  Immunosuppression management: Induction with Solu-Medrol/pred taper  MMF: 750 mg BID  Tac 2 mg BID, goal 8-12.  Hematology: Acute blood loss anemia. Received 10 units pRBCs post OR, has not received any pRBCs since midnight, appears that bleeding has  slowed.  Cardiorespiratory: Acute respiratory failure: Extubated in the OR, re-intubated shortly after transfer to the ICU  Circulatory failure requiring pressor support: Resolved   GI/Nutrition: NPO due to critical status, started on tube feeds  Bilateral pleural effusions: L pleural effusion stable, R pleural effusion slightly improved  Endocrine: Type 2 diabetes mellitus with steroid hyperglycemia: Continue insulin drip at this time  Fluid/Electrolytes: MIVF: Resuscitation with blood products and albumin  Renal: ANGEL, oliguric. Nephrology consulted, will plan for CRRT.   : Moore to remain due to critical status  Infectious disease: Continue vanc/zosyn  H/o S bovis bacteremia:  Prophylaxis: DVT, fall, GI, fungal  Disposition: SICU    Medical Decision Making: High  Subsequent visit 39904 (high level decision making)    JOSELYN/Fellow/Resident Provider: Franchesca Aguilera PA-C     Faculty: Ryder Cortez M.D.    __________________________________________________________________  Transplant History: Admitted 3/4/2024 for  donor liver transplant.   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    3/5/2024 (Liver), Postoperative day: 2     Interval History: History is obtained from the electronic health record and patient's spouse  Overnight events: Intubated. Oliguric. Bleeding appearing to stabilize with decreased need for transfusion.    ROS:   A 10-point review of systems was negative except as noted above.    Curent Meds:   [Held by provider] aspirin  325 mg Oral or Feeding Tube Daily    chlorhexidine  15 mL Mouth/Throat Q12H    fluconazole  200 mg Intravenous Q24H    [START ON 3/8/2024] methylPREDNISolone  50 mg Intravenous Once    Followed by    [START ON 3/9/2024] predniSONE  25 mg Oral Once    Followed by    [START ON 3/10/2024] predniSONE  10 mg Oral Once    multivitamins w/minerals  15 mL Per Feeding Tube Daily    mycophenolate  750 mg Oral BID IS    Or    mycophenolate  750 mg  "Oral or NG Tube BID IS    [START ON 3/13/2024] nystatin  10 mL Swish & Swallow 4x Daily    pantoprazole  40 mg Per Feeding Tube QAM AC    piperacillin-tazobactam  3.375 g Intravenous Q6H    polyethylene glycol  17 g Oral or Feeding Tube BID    protein modular  1 packet Per Feeding Tube BID    senna-docusate  1-2 tablet Oral or Feeding Tube BID    sodium chloride (PF)  3 mL Intravenous Q8H    sulfamethoxazole-trimethoprim  1 tablet Oral Daily    tacrolimus  2 mg Oral BID IS    Or    tacrolimus  2 mg Oral or NG Tube BID IS    thiamine  100 mg Oral or Feeding Tube Daily    ursodiol  300 mg Oral BID    Or    ursodiol  300 mg Oral or NG Tube BID    [START ON 3/8/2024] valGANciclovir  450 mg Oral Every Other Day    Or    [START ON 3/8/2024] valGANciclovir  450 mg Oral or NG Tube Every Other Day    vancomycin place fierro - receiving intermittent dosing  1 each Intravenous See Admin Instructions       Physical Exam:     Admit Weight: 107.2 kg (236 lb 4.8 oz)    Current Vitals:   /57   Pulse 68   Temp 99.1  F (37.3  C)   Resp 16   Ht 1.73 m (5' 8.11\")   Wt 116.3 kg (256 lb 6.3 oz)   SpO2 95%   BMI 38.86 kg/m      CVP (mmHg): 27 mmHg    Vital sign ranges:    Temp:  [96.3  F (35.7  C)-100.2  F (37.9  C)] 99.1  F (37.3  C)  Pulse:  [59-75] 68  Resp:  [16] 16  BP: ()/(54-57) 111/57  MAP:  [60 mmHg-90 mmHg] 82 mmHg  Arterial Line BP: ()/(44-69) 117/62  FiO2 (%):  [40 %-60 %] 40 %  SpO2:  [90 %-100 %] 95 %  Patient Vitals for the past 24 hrs:   BP Temp Temp src Pulse Resp SpO2 Weight   03/07/24 1100 -- 99.1  F (37.3  C) -- 68 -- 95 % --   03/07/24 1030 -- 99  F (37.2  C) Bladder 69 16 95 % --   03/07/24 1019 -- 99  F (37.2  C) Bladder 69 16 96 % --   03/07/24 1015 -- -- -- -- 16 -- --   03/07/24 1000 -- 99  F (37.2  C) -- 68 -- 96 % --   03/07/24 0900 -- 98.8  F (37.1  C) -- 65 -- 96 % --   03/07/24 0845 -- 98.8  F (37.1  C) -- 66 -- 95 % --   03/07/24 0836 111/57 98.8  F (37.1  C) -- 65 16 95 % -- "   03/07/24 0800 -- 98.6  F (37  C) Bladder 66 16 94 % --   03/07/24 0700 -- -- Bladder 62 16 94 % --   03/07/24 0600 -- 97.5  F (36.4  C) Bladder 63 16 95 % --   03/07/24 0531 -- -- -- 61 -- 96 % --   03/07/24 0500 -- 97.2  F (36.2  C) Bladder 59 16 96 % --   03/07/24 0400 -- 97.2  F (36.2  C) Bladder 59 16 98 % 116.3 kg (256 lb 6.3 oz)   03/07/24 0300 -- 97.2  F (36.2  C) Bladder 60 16 98 % --   03/07/24 0200 -- 97.7  F (36.5  C) Bladder 61 16 98 % --   03/07/24 0130 -- 97.9  F (36.6  C) -- 61 -- 97 % --   03/07/24 0100 -- 98.1  F (36.7  C) Bladder 63 16 98 % --   03/07/24 0030 -- 98.2  F (36.8  C) -- 64 -- 98 % --   03/07/24 0014 -- -- -- 71 -- 100 % --   03/07/24 0000 -- 98.2  F (36.8  C) Bladder 63 16 97 % --   03/06/24 2345 -- 98.4  F (36.9  C) -- 66 -- 95 % --   03/06/24 2330 -- 98.6  F (37  C) -- 66 -- 95 % --   03/06/24 2315 -- 98.6  F (37  C) -- 67 -- 95 % --   03/06/24 2300 -- 98.8  F (37.1  C) Bladder 67 16 95 % --   03/06/24 2252 -- -- -- 67 -- 95 % --   03/06/24 2230 -- 99  F (37.2  C) -- 71 -- 96 % --   03/06/24 2215 -- 99.1  F (37.3  C) -- 72 -- 96 % --   03/06/24 2200 -- 99.1  F (37.3  C) Bladder 71 16 96 % --   03/06/24 2145 -- 99.3  F (37.4  C) -- 72 -- 96 % --   03/06/24 2130 -- 99.5  F (37.5  C) -- 73 -- 96 % --   03/06/24 2115 -- 99.7  F (37.6  C) -- 73 -- 96 % --   03/06/24 2100 -- 99.9  F (37.7  C) -- 75 16 94 % --   03/06/24 2045 -- 100  F (37.8  C) -- 75 -- 94 % 116.7 kg (257 lb 4.4 oz)   03/06/24 2030 -- 100.2  F (37.9  C) -- 74 -- 93 % --   03/06/24 2015 -- 100.2  F (37.9  C) -- 74 -- 93 % --   03/06/24 2000 -- 100  F (37.8  C) Bladder 74 16 93 % --   03/06/24 1949 -- 99.9  F (37.7  C) Bladder 73 -- 93 % --   03/06/24 1945 -- 99.9  F (37.7  C) -- 73 -- 93 % --   03/06/24 1930 -- 99.7  F (37.6  C) -- 71 -- 93 % --   03/06/24 1915 -- 99.5  F (37.5  C) -- 68 -- 92 % --   03/06/24 1900 -- 99.3  F (37.4  C) -- 71 -- 93 % --   03/06/24 1850 -- 99.3  F (37.4  C) -- 72 -- 93 % --   03/06/24 1845  -- 99.1  F (37.3  C) -- 72 -- 93 % --   03/06/24 1840 -- 99.1  F (37.3  C) -- 71 -- 93 % --   03/06/24 1830 -- 99  F (37.2  C) -- 71 -- 93 % --   03/06/24 1815 -- 98.8  F (37.1  C) -- 70 -- 93 % --   03/06/24 1800 -- 98.6  F (37  C) -- 69 -- 92 % --   03/06/24 1745 -- 98.4  F (36.9  C) -- 69 -- 91 % --   03/06/24 1730 -- 98.1  F (36.7  C) -- 68 -- 92 % --   03/06/24 1726 -- 98.1  F (36.7  C) -- 68 -- 92 % --   03/06/24 1725 -- 98.1  F (36.7  C) -- 69 -- 92 % --   03/06/24 1715 -- 97.9  F (36.6  C) -- 68 -- 92 % --   03/06/24 1700 -- 97.5  F (36.4  C) -- 68 -- 92 % --   03/06/24 1651 -- 97.5  F (36.4  C) -- 68 -- 92 % --   03/06/24 1645 -- 97.3  F (36.3  C) -- 69 -- 93 % --   03/06/24 1638 -- -- -- 69 -- 92 % --   03/06/24 1632 -- -- -- 63 -- 91 % --   03/06/24 1630 -- 97.2  F (36.2  C) -- 63 -- 92 % --   03/06/24 1615 -- 97  F (36.1  C) -- 71 -- 93 % --   03/06/24 1612 -- -- -- 66 -- 90 % --   03/06/24 1610 -- -- -- 64 -- 91 % --   03/06/24 1607 -- -- -- 62 -- 91 % --   03/06/24 1605 -- -- -- 63 -- 90 % --   03/06/24 1602 -- -- -- 63 -- 91 % --   03/06/24 1600 -- (!) 96.6  F (35.9  C) Bladder 62 16 93 % --   03/06/24 1546 97/54 (!) 96.4  F (35.8  C) -- 63 16 -- --   03/06/24 1545 -- (!) 96.4  F (35.8  C) -- 62 -- 92 % --   03/06/24 1530 -- (!) 96.3  F (35.7  C) -- 63 -- 92 % --   03/06/24 1515 -- (!) 96.3  F (35.7  C) -- 63 -- 92 % --   03/06/24 1500 -- (!) 96.3  F (35.7  C) -- 63 -- 92 % --   03/06/24 1445 -- (!) 96.3  F (35.7  C) -- 63 -- 93 % --   03/06/24 1430 -- (!) 96.3  F (35.7  C) -- 66 -- 94 % --   03/06/24 1418 -- (!) 96.5  F (35.8  C) Axillary -- -- -- --   03/06/24 1415 -- (!) 96.3  F (35.7  C) -- 64 -- 95 % --   03/06/24 1400 -- (!) 96.6  F (35.9  C) -- 64 -- 98 % --   03/06/24 1345 -- 97  F (36.1  C) -- 62 -- 97 % --   03/06/24 1341 -- -- -- 65 -- 96 % --   03/06/24 1330 -- 97.3  F (36.3  C) -- 72 -- 94 % --   03/06/24 1315 -- 97.5  F (36.4  C) -- 71 -- 94 % --   03/06/24 1300 -- 97.5  F (36.4  C) --  71 -- 94 % --   03/06/24 1245 -- 97.5  F (36.4  C) -- 70 -- 93 % --   03/06/24 1230 -- 97.5  F (36.4  C) -- 72 -- 93 % --   03/06/24 1215 -- 97.5  F (36.4  C) -- 72 -- 95 % --   03/06/24 1200 -- 97.7  F (36.5  C) Bladder 73 16 94 % --   03/06/24 1145 -- 97.7  F (36.5  C) -- 74 -- 94 % --     General Appearance: Intubated, sedated  Skin: normal, warm  Heart: regular rate and rhythm  Lungs: Intubated  Abdomen: Incision covered, shadowing, drain with serosanguinous output. Abd soft  : Moore catheter in place  Extremities: edema: bilaterally  Neurologic: sedate    Frailty Scores          12/19/2023   Frailty Scores   Final Score Frail   Final Score Number 4       Data:   CMP  Recent Labs   Lab 03/07/24  1108 03/07/24  1014 03/07/24  0352 03/07/24  0348 03/06/24  2216 03/06/24  2211 03/06/24  1441 03/06/24  1425 03/06/24  1242 03/06/24  1233 03/06/24  0503 03/06/24  0500 03/04/24  1257 03/04/24  1058   NA  --   --   --  138  --  137   < >  --   --  137  --  136   < > 129*   POTASSIUM  --   --   --  4.2  --  4.2   < > 4.2  --  4.2  --  4.0   < > 4.3   CHLORIDE  --   --   --  103  --  103   < >  --   --  104  --  104   < > 96*   CO2  --   --   --  24  --  24   < >  --   --  25  --  25   < > 25   * 143*   < > 139*   < > 136*   < >  --    < > 143*   < > 154*   < > 392*   BUN  --   --   --  37.6*  --  31.8*   < >  --   --  26.6*  --  23.6*   < > 17.5   CR  --   --   --  2.06*  --  1.81*   < > 1.44*  --  1.45*  --  1.11   < > 1.08   GFRESTIMATED  --   --   --  38*  --  44*   < > 58*  --  58*  --  80   < > 83   MEG  --   --   --  8.8  --  8.9   < >  --   --  8.8  --  8.8   < > 8.7   ICAW  --   --   --  5.0  --  5.1  --  5.0  --   --   --  5.2   < >  --    MAG  --   --   --  2.2  --  2.2  --  2.0  --   --   --  1.6*  --  1.1*   PHOS  --   --   --  5.7*  --   --   --  4.7*  --   --   --  3.8  --  2.3*   AMYLASE  --   --   --   --   --   --   --   --   --   --   --  22*  --  10*   LIPASE  --   --   --   --   --   --   --    --   --   --   --  5*  --   --    ALBUMIN  --   --   --  2.8*  --   --   --   --   --  2.4*  --  2.3*   < > 2.6*   BILITOTAL  --   --   --  1.5*  --   --   --   --   --  1.6*  --  2.6*   < > 12.7*   ALKPHOS  --   --   --  56  --   --   --   --   --  40  --  47   < > 181*   AST  --   --   --  109*  --   --   --   --   --  156*  --  310*   < >  --    ALT  --   --   --  152*  --   --   --   --   --  183*  --  294*   < > 50    < > = values in this interval not displayed.     CBC  Recent Labs   Lab 03/07/24  0812 03/07/24  0348   HGB 7.9* 8.2*   WBC 8.8 9.0   PLT 48* 55*     COAGS  Recent Labs   Lab 03/07/24  0348 03/06/24  2211 03/06/24  1603 03/06/24  1233 03/06/24  0816   INR 1.28* 1.29* 1.41*   < > 1.57*   PTT  --   --  27  --  30    < > = values in this interval not displayed.      Urinalysis  Recent Labs   Lab Test 03/04/24  1042 02/23/24  1558   COLOR Yellow Yellow   APPEARANCE Clear Clear   URINEGLC 500* Negative   URINEBILI Negative Small*   URINEKETONE Negative Negative   SG 1.007 1.009   UBLD Negative Negative   URINEPH 5.0 5.0   PROTEIN Negative Negative   NITRITE Negative Negative   LEUKEST Negative Negative   RBCU <1 <1   WBCU <1 <1     Virology:  Hepatitis C Antibody   Date Value Ref Range Status   03/04/2024 Nonreactive Nonreactive Final     Comment:     A nonreactive screening test result does not exclude the possibility of exposure to or infection with HCV. Nonreactive screening test results in individuals with prior exposure to HCV may be due to antibody levels below the limit of detection of this assay or lack of reactivity to the HCV antigens used in this assay. Patients with recent HCV infections (<3 months from time of exposure) may have false-negative HCV antibody results due to the time needed for seroconversion (average of 8 to 9 weeks).

## 2024-03-07 NOTE — ANESTHESIA PROCEDURE NOTES
Perioperative ANEL Procedure Note    Staff -        Anesthesiologist:  Doyle Cortes MD       Performed By: anesthesiologist  Preanesthesia Checklist:  Patient identified, IV assessed, risks and benefits discussed, monitors and equipment assessed, procedure being performed at surgeon's request and anesthesia consent obtained.    ANEL Probe Insertion    Probe Status PRE Insertion: NO obvious damage  Probe type:  Adult 2D  Bite block used:   Soft  Insertion Technique: Easy, no oropharyngeal manipulation  Insertion complications: None obvious  Billing Report:ANEL report by Anesthesiologist (See Separate Report note)  Probe Status POST Removal: NO obvious damage      Echocardiographic and Doppler Measurements  Right Ventricle:  Cavity size normal.    Hypertrophy not present.   Thrombus not present.    Global function normal.     Left Ventricle:  Cavity size normal.    Hypertrophy not present.   Thrombus not present.   Global Function normal.   Ejection Fraction 60%.      Ventricular Regional Function:  1- Basal Anteroseptal:  normal  2- Basal Anterior:  normal  3- Basal Anterolateral:  normal  4- Basal Inferolateral:  normal  5- Basal Inferior:  normal  6- Basal Inferoseptal:  normal  7- Mid Anteroseptal:  normal  8- Mid Anterior:  normal  9- Mid Anterolateral:  normal  10- Mid Inferolateral:  normal  11- Mid Inferior:  normal  12- Mid Inferoseptal:  normal  13- Apical Anterior:  normal  14- Apical Lateral:  normal  15- Apical Inferior:  normal  16- Apical Septal:  normal  17- [place]:  normal    Valves  Aortic Valve: Annulus normal.  Stenosis not present.  Regurgitation absent.  Leaflets normal.  Leaflet motions normal.    Mitral Valve: Annulus normal.  Stenosis not present.  Regurgitation absent.  Leaflets normal.  Leaflet motions normal.    Tricuspid Valve: Annulus normal.  Stenosis not present.  Regurgitation absent.  Leaflets normal.  Leaflet motions normal.    Pulmonic Valve: Annulus normal.  Stenosis not present.   Regurgitation absent.                Post Intervention Findings  Procedure(s) performed:  Other (see comments) (liver). Global function:  Unchanged. Regional wall motion: Unchanged. Surgeon(s) notified of all postintervention findings: Yes.                 Echocardiogram Comments

## 2024-03-07 NOTE — PROGRESS NOTES
CRRT INITIATION NOTE    Consent for CRRT Completed:  YES  Patient s Vascular Access: Catheter              Placement Confirmed: YES      DATA:  Procedure:  CVVHDF  Start Time:  1515  Machine#:  8/8  Filter:    Blood Flow:  180  ML/min  Pre-Replacement Solution:  4K  Post-Replacement Solution:  4K  Dialysate Solution:  4K  Pre-Replacement Solution Rate:  1500 mL/hr  Post-Replacement Solution Rate:  200 mL/hr  Dialysate Flow Rate:  1500 mL/hr   Patient Removal Rate:  0-50 mL/hr      ASSESSMENT:  How Patient Tolerated Initiation:   Vital Signs:  B/P: 111/57, T: 99.3, P: 68, R: 16   Initial Pressures:  Access:  -67  Filter:  117  Return:  50  TMP:  25  Change in Filter Pressure:  33      INTERVENTIONS:  CRRT Start    PLAN:  CVVHDF remove 0-50ml/hr as tolerated.

## 2024-03-07 NOTE — ANESTHESIA POSTPROCEDURE EVALUATION
Patient: Mary Lopez    Procedure: Procedure(s):  Transplant liver recipient  donor, bile duct stent placement  Bench liver       Anesthesia Type:  General    Note:  Disposition: ICU            ICU Sign Out: Anesthesiologist/ICU physician sign out WAS performed   Postop Pain Control:    PONV: No   Neuro/Psych:             Sign Out: Acceptable/Baseline neuro status   Airway/Respiratory:             Sign Out: Acceptable/Baseline resp. status   CV/Hemodynamics:             Sign Out: Acceptable CV status   Other NRE:    DID A NON-ROUTINE EVENT OCCUR?            Last vitals:  Vitals:    24 0500 24 0531 24 0600   BP:      Pulse: 59 61 63   Resp: 16  16   Temp: 36.2  C (97.2  F)  36.4  C (97.5  F)   SpO2: 96% 96% 95%       Electronically Signed By: Doyle Cortes MD  2024  6:55 AM

## 2024-03-07 NOTE — PLAN OF CARE
Goal Outcome Evaluation:      Plan of Care Reviewed With: patient    Overall Patient Progress: improvingOverall Patient Progress: improving    Major Shift Events:  patient was successfully weaned off levophed drip and goal MAP maintained greater than 65.  Propofol drip d/c'd at 2000.  Patient more wakeful and able to follow commands although not consistently this shift.  Precedex titrated to 0.7 mcg/kg/hr IV and resting well between cares.  Fentanyl drip at 100 mcg/hr IV for pain management.  Improved ABG and O2 sats and FiO2 weaned to 40%.  Large bloody output from TIMOTHY.  Emptied as ordered every 2 hours for 175-250 ml.  TIMOTHY to gravity drainage.  Urine output 4-9 ml of clear lida urine measured every 2 hours. MD aware and order received this AM for 25 gm Albumin 5% IV and administered over 1 hour.  Edema continues.  Insulin drip titrated per protocol and no changes needed this shift.  Tolerating initiation and titration of tube feeds currently at 20 ml/hr.  OGT remains clamped.  Bilateral soft wrist restraints continue for patient safety and continuity of lines and drains.  Labs within parameters this AM.  Plan: continue to monitor and treat per goals of therapy.  Offer support as able to patient and family. Vent weaning as ordered and tolerated.  Monitor urine output and treat as ordered.  Monitor TIMOTHY drain output and treat as ordered.  Re-evaluate need for bilateral soft wrist restraints for patient safety and line continuity and discontinue when appropriate.    For vital signs and complete assessments, please see documentation flowsheets.

## 2024-03-07 NOTE — PROCEDURES
[facility]    Central line    Date/Time: 3/7/2024 2:21 PM    Performed by: Nirmal Presley MD  Authorized by: Irineo Starr MD  Indications: vascular access and central pressure monitoring      UNIVERSAL PROTOCOL   Site Marked: NA  Prior Images Obtained and Reviewed:  Yes  Required items: Required blood products, implants, devices and special equipment available    Patient identity confirmed:  Provided demographic data and hospital-assigned identification number  Patient was reevaluated immediately before administering moderate or deep sedation or anesthesia  Confirmation Checklist:  Relevant allergies, procedure was appropriate and matched the consent or emergent situation, correct equipment/implants were available and patient's identity using two indicators  Time out: Immediately prior to the procedure a time out was called    Universal Protocol: the Joint Commission Universal Protocol was followed    Preparation: Patient was prepped and draped in usual sterile fashion       ANESTHESIA    Anesthesia:  Local infiltration  Local Anesthetic:  Lidocaine 1% without epinephrine  Anesthetic Total (mL):  3      SEDATION  Patient Sedated: Yes    Sedation Type:  Moderate (conscious) sedation  Sedation:  See MAR for details  Vital signs: Vital signs monitored during sedation      Preparation: skin prepped with ChloraPrep  Skin prep agent dried: skin prep agent completely dried prior to procedure  Sterile barriers: all five maximum sterile barriers used - cap, mask, sterile gown, sterile gloves, and large sterile sheet  Hand hygiene: hand hygiene performed prior to central venous catheter insertion  Catheter type: triple lumen  Ultrasound guidance: yes  Sterile ultrasound techniques: sterile gel and sterile probe covers were used  Number of attempts: 2  Successful placement: yes  Post-procedure: line sutured and dressing applied  Assessment: blood return through all  ports      PROCEDURE  Describe Procedure: Successful left internal jugular CVC placement without any immediate complications. Position will be confirmed with CXR.   Length of time physician/provider present for 1:1 monitoring during sedation: 25   Dr. Celaya, SICU fellow, was present for the entire procedure, and Dr. Starr, SICU attending, was available.     Pre- and post-procedure diagnoses: hemorrhagic shock  -------------------------------------------------------------------------------------  Insertion of non-tunneled central venous catheter for dialysis access    DOS 03/07/2024  Pre- and post-procedure diagnoses: oliguric acute kidney injury  Location: Right internal jugular vein  13 cm dual-lumen dialysis line    Details:   Consent was obtained from the patient's wife. The right neck was prepped and draped in sterile fashion. The pre-existing MAC lines were prepped into the field. The caudal MAC line was removed and a pursestring suture closed the wound. The second MAC line was accessed with a wire and then removed. The Fidencio dialysis line was inserted over the wire and the wire was removed. The new line was sutured in place. Both ports were flushed and fabian back, sterile caps and a sterile dressing were applied.     Post-procedure CXR was obtained.    I was present for both of the above procedures in their entirety.    Irineo Starr MD   Critical Care and Acute Care Surgery

## 2024-03-07 NOTE — PHARMACY-VANCOMYCIN DOSING SERVICE
"Pharmacy Vancomycin Note  Date of Service 2024  Patient's  1970   53 year old, male    Indication: Bacteremia and Surgical Prophylaxis  Day of Therapy: 3  Current vancomycin regimen: intermittent dosing   Current vancomycin monitoring method: Trough (Method 2 = manual dose calculation)  Current vancomycin therapeutic monitoring goal: 10-15 mg/L (Strep bovis PORSHA 0.25ug/mL)    Current estimated CrCl = Estimated Creatinine Clearance: 52 mL/min (A) (based on SCr of 2.06 mg/dL (H)).    Creatinine for last 3 days  3/5/2024:  4:09 PM Creatinine 0.89 mg/dL  3/6/2024:  5:00 AM Creatinine 1.11 mg/dL; 12:33 PM Creatinine 1.45 mg/dL;  2:25 PM Creatinine 1.44 mg/dL;  6:08 PM Creatinine 1.65 mg/dL; 10:11 PM Creatinine 1.81 mg/dL  3/7/2024:  3:48 AM Creatinine 2.06 mg/dL    Recent Vancomycin Levels (past 3 days)  3/6/2024: 10:51 AM Vancomycin 23.0 ug/mL  3/7/2024:  3:48 AM Vancomycin 19.5 ug/mL    Vancomycin IV Administrations (past 72 hours)                     vancomycin (VANCOCIN) 1,250 mg in 0.9% NaCl 250 mL intermittent infusion (mg) 1,250 mg New Bag 24 2347     1,250 mg Given  1054     1,250 mg New Bag 24 2304                    Nephrotoxins and other renal medications (From now, onward)      Start     Dose/Rate Route Frequency Ordered Stop    24 1930  piperacillin-tazobactam (ZOSYN) 3.375 g vial to attach to  mL bag        Note to Pharmacy: For [facility], [facility] and [facility]: For Zosyn-naive patients, use the \"Zosyn initial dose + extended infusion\" order panel.    3.375 g  over 30 Minutes Intravenous EVERY 6 HOURS 24 1501 03/10/24 1959    24 1457  vancomycin place fierro - receiving intermittent dosing         1 each Intravenous SEE ADMIN INSTRUCTIONS 24 1458      24 1800  tacrolimus (GENERIC) capsule 2 mg        See Hyperspace for full Linked Orders Report.    2 mg Oral 2 TIMES DAILY. 24 1529      24 1800  tacrolimus (GENERIC) suspension 2 mg        See " Hyperspace for full Linked Orders Report.    2 mg Oral or NG Tube 2 TIMES DAILY. 03/05/24 1529      03/05/24 1800  norepinephrine (LEVOPHED) 16 mg in  mL infusion MAX CONC CENTRAL LINE         0.01-0.6 mcg/kg/min × 107.2 kg  1-60.3 mL/hr  Intravenous CONTINUOUS 03/05/24 1733                 Contrast Orders - past 72 hours (72h ago, onward)      None            Interpretation of levels and current regimen:  Vancomycin of 19.5mg/L reflects an approximately 28hr post-dose level. Renal replacement therapy to be initiated today - will recheck level in AM and resume scheduled dosing once level is anticipated to be within goal range.     Has serum creatinine changed greater than 50% in last 72 hours: Yes    Urine output:  anuric    Renal Function: Worsening    Plan:  Continue intermittent dosing of vancomycin - no additional doses to be given today.   Vancomycin monitoring method: Renal Replacement Therapy  Vancomycin therapeutic monitoring goal: 10-15 mg/L  Pharmacy will check vancomycin levels as appropriate in 1-3 Days.  Serum creatinine levels will be ordered a minimum of twice weekly.    Celsa Echevarria, PharmD, BCCCP

## 2024-03-07 NOTE — CONSULTS
[facility]  Transplant Nephrology Consult  Date of Admission:  3/4/2024  Today's Date: 03/07/2024  Requesting physician: Ryder Cortez MD    Recommendations:  - Start CRRT after line placement with UF 0-50 ml net negative per hour (ordered).  - Consider lower tacrolimus goal.    Assessment & Plan   # ANGEL: Increased serum creatinine.   - ANGEL felt secondary to volume shifts with liver transplant.   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  Transaminases Trend down.  Followed by Transplant Surgery.   - 3/6/24 Transplant Renal Ultrasound:  Hematoma inferior to the right lobe of the liver     # Immunosuppression: Tacrolimus immediate release (goal 8-12), Mycophenolate mofetil (dose 750 mg every 12 hours), and Methylprednisolone (dose taper)   - Induction with Recent Transplant:  Per Liver Tx Protocol   - Continue with intensive monitoring of immunosuppression for efficacy and toxicity.   - Goal tacrolimus level lower due to ANGEL.   - Changes: Management per Transplant Surgery.    # Infection Prophylaxis:   - PJP: Sulfa/TMP (Bactrim)  - CMV: Valganciclovir (Valcyte), CMV IgG Ab negative, follow-up donor CMV status.  - Thrush: Nystatin (Mycostatin) swish and swallow and Fluconazole (Diflucan)  - Fungal: Fluconazole (Diflucan)    # Blood Pressure: Controlled;  Goal BP: MAP > 65   - Volume status: Total body volume up, but intravascularly euvolemic     - Changes: Yes - UF 0-50 ml net negative per hour.     # Type 2 Diabetes: Controlled (HbA1c <7%) Last HbA1c: 4.8%   - Management as per primary team.  On insulin gtt.    # Anemia in Chronic Disease/Surgery: Hgb: Trend down      MAITE: No   - Iron studies: Replete (12/2023)    # Thrombocytopenia: Trend down.  Receiving platelets infusion today.    # Mineral Bone Disorder:   - Vitamin D; level:  26  (2023)         On supplement: No  - Calcium; level: Normal        On supplement: No  - Phosphorus; level: High (trend for now)       On supplement: No    # Electrolytes:   - Potassium; level: Normal        On supplement: No  - Magnesium; level: Normal        On supplement: No  - Bicarbonate; level: Normal        On supplement: No  - Sodium; level: Normal    # Transplant History:  Etiology of Organ Failure: Alcohol-related liver disease  Tx: Liver Tx  Transplant: 3/5/2024 (Liver)  Significant changes in immunosuppression: Slightly lower tacrolimus level goal due to ANGEL.  Significant transplant-related complications: None    Recommendations were communicated to the primary team verbally.    Yong Ortiz, APRN CNP  Contact information available via [facility] Paging/Directory    Physician Attestation     I saw and evaluated Mary Lopez as part of a shared APRN/PA visit.     I personally reviewed the vital signs, medications, and labs.    I personally provided a substantive portion of care for this patient and I approve the care plan as written by the JOSELYN.  I was involved with Medical Decision Making including: Please see A&P for additional details of medical decision making.  Worsening kidney function with decreased urine output and increasing volume overload.  Will plan to start CRRT with some volume removal, as tolerated.  Recommend keeping slightly lower tacrolimus goal at ~ 6-8.    Flaco Sanchez MD  Date of Service (when I saw the patient): 24    REASON FOR CONSULT   ANGEL post Liver Transplant    History of Present Illness   Mary Lopez is a 53 year old male with history of ESLD secondary to ETOH cirrhosis c/b encephalopathy, hyponatremia, chronic thrombocytopenia, macrocytic anemia s/p a  donor liver transplant on 3/5/24.  Also with history of HTN and DM2.  He had previous episodes of ANGEL with recent admissions (2024) for encephalopathy with suspected SBP and -3/2  for Strep bovis bacteremia. The serum creatinine today of 2.06 is up from baseline of ~ 0.7-0.9.  Urine output has been 169 ml over the last three shifts.         SBP 100s - 110s overnight. Tmax 100.2.      Review of Systems    Review of systems not obtained due to patient factors - intubation and sedation    Past Medical History    I have reviewed this patient's medical history and updated it with pertinent information if needed.   Past Medical History:   Diagnosis Date    ANGEL (acute kidney injury) (H24)     Alcoholic cirrhosis of liver without ascites (H) 2023    Alcoholic hepatitis with ascites (H28) 10/03/2023    Alcoholic hepatitis without ascites (H28) 2023    Closed fracture of one rib of left side 2023    Concussion without loss of consciousness 2020    Decompensated hepatic cirrhosis (H) 09/15/2023    Diabetes mellitus, type 2 (H) 2023    Essential hypertension 2020    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)     Mild hyperlipidemia 2021    Persistent insomnia 2023    Portal hypertension (H) 2023    Scrotal abscess     Secondary esophageal 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 (H) 2023    Tobacco abuse disorder 2020    Type 2 diabetes mellitus with hyperglycemia (H) 2023       Past Surgical History   I have reviewed this patient's surgical history and updated it with pertinent information if needed.  Past Surgical History:   Procedure Laterality Date    BENCH LIVER  3/5/2024    Procedure: Bench liver;  Surgeon: Ryder Cortez MD;  Location: [facility] OR    CHOLECYSTECTOMY      COLONOSCOPY N/A 2024    Procedure: COLONOSCOPY, WITH POLYPECTOMY;  Surgeon: Jak Urbina MD;  Location: [facility]    CV RIGHT HEART CATH MEASUREMENTS RECORDED N/A 2024    Procedure: Right Heart Catheterization;  Surgeon: Alfred Tafoya MD;  Location:  HE[facility] CARDIAC CATH LAB    TONSILLECTOMY      TRANSPLANT LIVER RECIPIENT  DONOR N/A  3/5/2024    Procedure: Transplant liver recipient  donor, bile duct stent placement;  Surgeon: Ryder Cortez MD;  Location: [facility] OR    VASECTOMY         Family History   I have reviewed this patient's family history and updated it with pertinent information if needed.   Family History   Problem Relation Age of Onset    Anxiety Disorder Mother     Depression Mother     Bipolar Disorder Mother     Chronic Obstructive Pulmonary Disease Mother     Lung Cancer Mother 81    Morbid Obesity Father     Diabetes Father     Diabetes Type 2  Brother     Substance Abuse Maternal Grandfather     Substance Abuse Paternal Grandfather     Colon Cancer No family hx of     Liver Disease No family hx of        Social History   I have reviewed this patient's social history and updated it with pertinent information if needed. Mary Lopez  reports that he has quit smoking. His smoking use included cigarettes. He has never been exposed to tobacco smoke. His smokeless tobacco use includes chew. He reports that he does not currently use alcohol after a past usage of about 12.0 standard drinks of alcohol per week. He reports that he does not currently use drugs.    Allergies   Allergies   Allergen Reactions    Other Food Allergy Anaphylaxis     Legumes (black beans, baked beans, chickpeas)    Pineapple Itching     Prior to Admission Medications    [Held by provider] aspirin  325 mg Oral or Feeding Tube Daily    chlorhexidine  15 mL Mouth/Throat Q12H    fluconazole  200 mg Intravenous Q24H    [START ON 3/8/2024] methylPREDNISolone  50 mg Intravenous Once    Followed by    [START ON 3/9/2024] predniSONE  25 mg Oral Once    Followed by    [START ON 3/10/2024] predniSONE  10 mg Oral Once    multivitamins w/minerals  15 mL Per Feeding Tube Daily    mycophenolate  750 mg Oral BID IS    Or    mycophenolate  750 mg Oral or NG Tube BID IS    [START ON 3/13/2024] nystatin  10 mL Swish & Swallow 4x Daily    pantoprazole  40 mg  "Per Feeding Tube QAM AC    piperacillin-tazobactam  3.375 g Intravenous Q6H    polyethylene glycol  17 g Oral or Feeding Tube BID    protein modular  1 packet Per Feeding Tube BID    senna-docusate  1-2 tablet Oral or Feeding Tube BID    sodium chloride (PF)  3 mL Intravenous Q8H    sulfamethoxazole-trimethoprim  1 tablet Oral Daily    tacrolimus  2 mg Oral BID IS    Or    tacrolimus  2 mg Oral or NG Tube BID IS    thiamine  100 mg Oral or Feeding Tube Daily    ursodiol  300 mg Oral BID    Or    ursodiol  300 mg Oral or NG Tube BID    [START ON 3/8/2024] valGANciclovir  450 mg Oral Every Other Day    Or    [START ON 3/8/2024] valGANciclovir  450 mg Oral or NG Tube Every Other Day    vancomycin place fierro - receiving intermittent dosing  1 each Intravenous See Admin Instructions      dexmedeTOMIDine 0.7 mcg/kg/hr (24 09)    dextrose      dextrose      fentaNYL 100 mcg/hr (24)    insulin regular 6 Units/hr (24)    norepinephrine Stopped (24)    BETA BLOCKER NOT PRESCRIBED         Physical Exam   Temp  Av  F (36.7  C)  Min: 96.3  F (35.7  C)  Max: 100.2  F (37.9  C)  Arterial Line BP  Min: 85/53  Max: 130/58  Arterial Line MAP (mmHg)  Av.5 mmHg  Min: 59 mmHg  Max: 90 mmHg      Pulse  Av.6  Min: 59  Max: 129 Resp  Av.6  Min: 11  Max: 25  FiO2 (%)  Av.8 %  Min: 40 %  Max: 60 %  SpO2  Av.7 %  Min: 88 %  Max: 100 %    CVP (mmHg): (S) 17 mmHg (Transplant notified)/57   Pulse 65   Temp 98.8  F (37.1  C)   Resp 16   Ht 1.73 m (5' 8.11\")   Wt 116.3 kg (256 lb 6.3 oz)   SpO2 96%   BMI 38.86 kg/m     Date 24 - 24 0659   Shift 9580-4872 3376-9380 7127-0986 24 Hour Total   INTAKE   I.V. 113.4   113.4   NG/GT 30   30   Enteral 80   80   Shift Total(mL/kg) 223.4(1.92)   223.4(1.92)   OUTPUT   Urine 50   50   Drains 250   250   Shift Total(mL/kg) 300(2.58)   300(2.58)   Weight (kg) 116.3 116.3 116.3 116.3      Admit Weight: 107.2 kg " (236 lb 4.8 oz)     GENERAL APPEARANCE: alert and no distress  HENT: intubated  RESP: clear, diminished at bases  CV: regular rhythm, normal rate, no rub, no murmur  EDEMA: 3+ LE edema bilaterally  ABDOMEN: distended, soft, TIMOTHY drain in place.  MS: extremities normal - no gross deformities noted, no evidence of inflammation in joints, no muscle tenderness  SKIN: no rash    Data   CMP  Recent Labs   Lab 03/07/24  0904 03/07/24  0557 03/07/24  0352 03/07/24  0348 03/06/24  2216 03/06/24  2211 03/06/24  1812 03/06/24  1808 03/06/24  1441 03/06/24  1425 03/06/24  1242 03/06/24  1233 03/06/24  0503 03/06/24  0500 03/05/24  1659 03/05/24  1609 03/04/24  1257 03/04/24  1058   NA  --   --   --  138  --  137  --  137  --   --   --  137  --  136  --  135   < > 129*   POTASSIUM  --   --   --  4.2  --  4.2  --  4.2  --  4.2  --  4.2  --  4.0  --  4.0   < > 4.3   CHLORIDE  --   --   --  103  --  103  --  103  --   --   --  104  --  104  --  103  --  96*   CO2  --   --   --  24  --  24  --  24  --   --   --  25  --  25  --  26  --  25   ANIONGAP  --   --   --  11  --  10  --  10  --   --   --  8  --  7  --  6*  --  8   * 138* 128* 139*   < > 136*   < > 133*   < >  --    < > 143*   < > 154*   < > 228*   < > 392*   BUN  --   --   --  37.6*  --  31.8*  --  29.2*  --   --   --  26.6*  --  23.6*  --  17.3  --  17.5   CR  --   --   --  2.06*  --  1.81*  --  1.65*  --  1.44*  --  1.45*  --  1.11  --  0.89  --  1.08   GFRESTIMATED  --   --   --  38*  --  44*  --  50*  --  58*  --  58*  --  80  --  >90  --  83   MEG  --   --   --  8.8  --  8.9  --  9.0  --   --   --  8.8  --  8.8  --  9.2  --  8.7   MAG  --   --   --  2.2  --  2.2  --   --   --  2.0  --   --   --  1.6*  --   --   --  1.1*   PHOS  --   --   --  5.7*  --   --   --   --   --  4.7*  --   --   --  3.8  --   --   --  2.3*   PROTTOTAL  --   --   --  4.5*  --   --   --   --   --   --   --  3.7*  --  3.6*  --  3.2*  --  6.2*   ALBUMIN  --   --   --  2.8*  --   --   --   --    --   --   --  2.4*  --  2.3*  --  2.0*  --  2.6*   BILITOTAL  --   --   --  1.5*  --   --   --   --   --   --   --  1.6*  --  2.6*  --  8.7*  --  12.7*   ALKPHOS  --   --   --  56  --   --   --   --   --   --   --  40  --  47  --  71  --  181*   AST  --   --   --  109*  --   --   --   --   --   --   --  156*  --  310*  --  1,038*  --   --    ALT  --   --   --  152*  --   --   --   --   --   --   --  183*  --  294*  --  596*  --  50    < > = values in this interval not displayed.     CBC  Recent Labs   Lab 03/07/24  0812 03/07/24 0348 03/06/24 2211 03/06/24  1808   HGB 7.9* 8.2* 8.7* 7.7*   WBC 8.8 9.0 12.0* 10.7   RBC 2.62* 2.71* 2.85* 2.63*   HCT 22.3* 23.0* 24.2* 22.3*   MCV 85 85 85 85   MCH 30.2 30.3 30.5 29.3   MCHC 35.4 35.7 36.0 34.5   RDW 15.9* 15.6* 15.4* 15.1*   PLT 48* 55* 80* 65*     INR  Recent Labs   Lab 03/07/24 0348 03/06/24 2211 03/06/24  1603 03/06/24  1233 03/06/24  0816 03/05/24  2338 03/05/24  1609 03/05/24  1519   INR 1.28* 1.29* 1.41* 1.59* 1.57*   < > 2.12* 2.12*   PTT  --   --  27  --  30  --  87* 89*    < > = values in this interval not displayed.     ABG  Recent Labs   Lab 03/07/24 0348 03/06/24  1808 03/06/24  1235 03/06/24  0501   PH 7.45 7.46* 7.45 7.48*   PCO2 37 37 37 35   PO2 103 76* 136* 66*   HCO3 25 26 26 26   O2PER 50 60 60 50      Urine Studies  Recent Labs   Lab Test 03/04/24  1042 02/23/24  1558 02/15/24  2015 11/09/23  1147   COLOR Yellow Yellow Dark Yellow* Yellow   APPEARANCE Clear Clear Clear Clear   URINEGLC 500* Negative Negative >=1000*   URINEBILI Negative Small* Small* Small*   URINEKETONE Negative Negative Negative Negative   SG 1.007 1.009 1.014 <=1.005   UBLD Negative Negative Negative Negative   URINEPH 5.0 5.0 5.0 6.0   PROTEIN Negative Negative Negative Negative   UROBILINOGEN  --   --   --  2.0*   NITRITE Negative Negative Negative Negative   LEUKEST Negative Negative Negative Negative   RBCU <1 <1 <1 0-2   WBCU <1 <1 <1 0-5     No lab results  found.  PTH  No lab results found.  Iron Studies  Recent Labs   Lab Test 12/19/23  0758 08/23/23  1018 08/09/23  1122 07/15/23  1048   IRON 135 120 118 92   KE 2,948* 4,261* 4,611*  --        IMAGING:  All imaging studies reviewed by me.

## 2024-03-07 NOTE — PROVIDER NOTIFICATION
SICU notified about 1800 labs. Hgb 7.7 but this was drawn before the last unit of PRBCs was given. Notified of worsening blood gas. Provider said they would notify the attending.

## 2024-03-07 NOTE — PROGRESS NOTES
[facility]    ICU Progress Note     Primary Team: Transplant Surgery   Reason for Critical Care Admission: Ventilator Management and Hemodynamic Monitoring  Admitting Physician: Ryder Cortez MD  Date of Admission:  3/4/2024    Assessment: Critical Care   Mary Lopez is a 53 year old male admitted on 3/4/2024. He has a history of alcoholic cirrhosis complicated by hepatic encephalopathy and suspected SBP who presented to the hospital for consideration of  donor liver transplant. He was recently admitted for S.bovis bacteremia and was discharged on amoxicillin and levofloxacin. He underwent DDLT on 3/5/2024 and was admitted to the SICU following his surgery for ventilator management and hemodynamic monitoring. Intra-operatively, patient was transfused 7 U of pRBC and 10 units of platelets. He was extubated in the OR and upon admission to the ICU, he was on oxygen mask, off pressors and sedation. However, became unresponsive shortly after with oxygen saturation dropping to 70s and had to be re-intubated the same day. Needed multiple units of blood product transfusion on 3/6 with high amount of bloody TIMOTHY output. Currently stable on ventilator and off pressors.    CHANGES TODAY:  - PST as tolerated  - Miralax to BID   - Lasix 120 mg once  - Follow-up CXR     Plan: Critical Care   Neuro/ pain/ sedation:  # Post-operative pain   # Hepatic Encephalopathy   - Monitor neurological status. Notify provider for any acute changes in exam  - Sedation: Precedex gtt   - Pain: Fentanyl gtt, PRN Oxy and dilaudid  - Sleep: PTA melatonin and PRN trazodone, will resume once extubated         Pulmonary:  # Acute hypoxic respiratory failure  # Pleural effusion, bilateral   - Vent Mode: CMV/AC  (Continuous Mandatory Ventilation/ Assist Control)  FiO2 (%): (S) 40 %  Resp Rate (Set): 16 breaths/min  Tidal Volume (Set, mL): 500 mL  PEEP (cm H2O): 5 cmH2O  Resp: 16  - PST  as tolerated   - CXR (3/7): Bilateral pleural effusion, no significant changes compared to prior CXR      Cardiovascular:  # Multifactorial shock (likely hemorrhagic)  - Monitor hemodynamic status.  - MAP goal > 65, currently off pressors, norepinephrine available if needed    - PTA midodrine currently held  - 500 cc 5% albumin bolus overnight     GI/Nutrition:  # S/p  Donor Liver Transplant   # Esophageal 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   # Portal Hypertension   #Severe Protein Calorie Malnutrition due to Acute Illness   - Diet: NPO  - Nutrition consulted, post-pyloric feeding tube was placed on 3/6. TF advancing towards goal rate    - Bowel regimen increased (Miralax to BID)   - Post-transplant prophylaxis: Tacrolimus, Mycophenolate and steroid taper   - Monitor daily hepatic panel   - TIMOTHY drain management per transplant, currently to gravity and emptied every 2 hours     Renal/ Fluid Balance:   # Hyponatremia  - Will monitor intake and output, low urine output with rising Cr   - ICU electrolyte replacement protocol  - Monitor daily BMP  - PTA torsemide and spironolactone on hold for now     - Lasix 120 mg once, monitor urine output. Will contact nephrology if no response for potential dialysis.    - If dialysis is needed, will exchange MAC for dialysis line and place a new central line     Endocrine:  # Stress Hyperglycemia   # Type 2 Diabetes Mellitus    - Maintain blood glucose levels < 180   - Insulin gtt   - Steroid taper per transplant team     ID:  - Monitor fever curve and daily CBC   - Post-transplant prophylaxis       - Vanc/Zosyn x 5 days        - Fluconazole x 7 days        - Valcyte        - Bactrim          Hematology:  # Acute Blood Loss Anemia  # Macrocytic Anemia   # Chronic Thrombocytopenia  - Monitor daily CBC   -  mg held yesterday with concern for bleeding   - Hgb > 8, INR < 1.5, Fibrinogen > 200, PLT > 50, transfuse as needed   - Received multiple units of blood products on 3/6, 2  "units of platelets this morning     MSK:   - PT and OT consulted. Appreciate recommendations.    Lines/ tubes/ drains: R internal jugular CVC x 1, left brachial a-line, PIV x 3 (2 L and 1R), TIMOTHY on the right side of the abdomen    Prophylaxis:  - DVT Prophylaxis: Pneumatic Compression Devices  - PUD Prophylaxis: PPI while intubated     Code Status: Full Code      Disposition:  - SICU    The patient's care was discussed with the Attending Physician, Dr. Starr .    Clinically Significant Risk Factors          # Hypocalcemia: Lowest iCa = 3.6 mg/dL in last 2 days, will monitor and replace as appropriate  # Hypercalcemia: Highest iCa = 5.6 mg/dL in last 2 days, will monitor as appropriate  # Hypomagnesemia: Lowest Mg = 1.6 mg/dL in last 2 days, will replace as needed   # Hypoalbuminemia: Lowest albumin = 2 g/dL at 3/5/2024  4:09 PM, will monitor as appropriate    # Coagulation Defect: INR = 1.28 (Ref range: 0.85 - 1.15) and/or PTT = 27 Seconds (Ref range: 22 - 38 Seconds), will monitor for bleeding  # Thrombocytopenia: Lowest platelets = 48 in last 2 days, will monitor for bleeding  # Acute Kidney Injury, unspecified: based on a >150% or 0.3 mg/dL increase in last creatinine compared to past 90 day average, will monitor renal function  # Hypertension: Noted on problem list        # Obesity: Estimated body mass index is 38.86 kg/m  as calculated from the following:    Height as of this encounter: 1.73 m (5' 8.11\").    Weight as of this encounter: 116.3 kg (256 lb 6.3 oz)., PRESENT ON ADMISSION  # Severe Malnutrition: based on nutrition assessment, PRESENT ON ADMISSION     # Financial/Environmental Concerns: none            Nirmal Presley MD  [facility]  Securely message with [employer] (more info)  Text page via [employer] Paging/Directory   _____________________________________________________________________    Chief Complaint   EtOH cirrhosis s/p DDLT     History of Present " Illness   Mary Lopez is a 53 year old male with a history of type 2 DM, HTN, HLD, alcoholic cirrhosis complicated by hepatic encephalopathy, hyponatremia, chronic thrombocytopenia, macrocytic anemia and suspected SBP who presented to the hospital for consideration of  donor liver transplant. He was recently admitted for S.bovis bacteremia and was discharged on amoxicillin and levofloxacin. He was reactivate on the transplant list as of 2024, and underwent DDLT on 3/5/2024 as a  donor organ became available.     Review of Systems    The 10 point Review of Systems is negative other than noted in the HPI or here.     Past Medical History    I have reviewed this patient's medical history and updated it with pertinent information if needed.   Past Medical History:   Diagnosis Date    ANGEL (acute kidney injury) (H24)     Alcoholic cirrhosis of liver without ascites (H) 2023    Alcoholic hepatitis with ascites (H28) 10/03/2023    Alcoholic hepatitis without ascites (H28) 2023    Closed fracture of one rib of left side 2023    Concussion without loss of consciousness 2020    Decompensated hepatic cirrhosis (H) 09/15/2023    Diabetes mellitus, type 2 (H) 2023    Essential hypertension 2020    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)     Mild hyperlipidemia 2021    Persistent insomnia 2023    Portal hypertension (H) 2023    Scrotal abscess     Secondary esophageal 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 (H) 2023    Tobacco abuse disorder 2020    Type 2 diabetes mellitus with hyperglycemia (H) 2023     Past Surgical History   I have reviewed this patient's surgical history and updated it with pertinent information if needed.  Past Surgical History:   Procedure Laterality Date    BENCH LIVER  3/5/2024    Procedure: Bench liver;  Surgeon: Ryder Cortez MD;  Location: [facility] OR    CHOLECYSTECTOMY      COLONOSCOPY N/A  2024    Procedure: COLONOSCOPY, WITH POLYPECTOMY;  Surgeon: Jak Urbina MD;  Location: [facility]    CV RIGHT HEART CATH MEASUREMENTS RECORDED N/A 2024    Procedure: Right Heart Catheterization;  Surgeon: Alfred Tafoya MD;  [facility]    TONSILLECTOMY      TRANSPLANT LIVER RECIPIENT  DONOR N/A 3/5/2024    Procedure: Transplant liver recipient  donor, bile duct stent placement;  Surgeon: Ryder Cortez MD;  Location: [facility] OR    VASECTOMY       Social History   I have reviewed this patient's social history and updated it with pertinent information if needed.    Social History     Tobacco Use    Smoking status: Former     Types: Cigarettes     Passive exposure: Never    Smokeless tobacco: Current     Types: Chew     Last attempt to quit: 2004    Tobacco comments:     Chew daily 1/3 of a tin per day   Vaping Use    Vaping Use: Never used   Substance Use Topics    Alcohol use: Not Currently     Alcohol/week: 12.0 standard drinks of alcohol     Types: 12 Standard drinks or equivalent per week     Comment: Sober since 2023    Drug use: Not Currently     Family History   I have reviewed this patient's family history and updated it with pertinent information if needed.  Family History   Problem Relation Age of Onset    Anxiety Disorder Mother     Depression Mother     Bipolar Disorder Mother     Chronic Obstructive Pulmonary Disease Mother     Lung Cancer Mother 81    Morbid Obesity Father     Diabetes Father     Diabetes Type 2  Brother     Substance Abuse Maternal Grandfather     Substance Abuse Paternal Grandfather     Colon Cancer No family hx of     Liver Disease No family hx of      Prior to Admission Medications   Prior to Admission Medications   Prescriptions Last Dose Informant Patient Reported? Taking?   Continuous Blood Gluc Sensor (DEXCOM G7 SENSOR) MISC   No No   Sig: Change every 10 days.   HYDROcodone-acetaminophen (NORCO) 5-325 MG tablet  3/3/2024  Yes Yes   Sig: Take 1 tablet by mouth every 6 hours as needed for severe pain Patient took 1 tablet last night for pain.   Insulin Lispro (HUMALOG KWIKPEN) 200 UNIT/ML soln 3/4/2024  No Yes   Sig: Inject 8 Units Subcutaneous 4 times daily (with meals and nightly) Give 8 units before each meal. Give additional units for correction with sliding scale (1 unit for each order of 35 blood glucose >140 before meals).   amoxicillin (AMOXIL) 500 MG capsule 3/4/2024  No Yes   Sig: Take 2 capsules (1,000 mg) by mouth every 8 hours for 7 days   blood glucose (NO BRAND SPECIFIED) test strip   No No   Sig: Use to test blood sugar two times daily or as directed. To accompany: Blood Glucose Monitor Brands: per insurance.   blood glucose monitoring (NO BRAND SPECIFIED) meter device kit   No No   Sig: Use to test blood sugar twice times daily or as directed. Preferred blood glucose meter OR supplies to accompany: Blood Glucose Monitor Brands: per insurance.   folic acid (FOLVITE) 1 MG tablet 3/4/2024  No Yes   Sig: Take 1 tablet (1 mg) by mouth daily   insulin degludec (TRESIBA FLEXTOUCH) 100 UNIT/ML pen 3/3/2024 at 2:30 pm  No Yes   Sig: Inject 36 Units Subcutaneous daily Inject 36 units daily.   insulin lispro (HUMALOG KWIKPEN) 100 UNIT/ML (1 unit dial) KWIKPEN 3/4/2024  No Yes   Sig: Inject 1-10 Units Subcutaneous 3 times daily (before meals) for 50 days Correction scale: Give 1 unit insulin for every order of 35 that blood glucose level is >140 three times daily before meals and for every order of 35 that blood glucose is >200 at bedtime   insulin pen needle (BD PEN NEEDLE SHERRIE 2ND GEN) 32G X 4 MM miscellaneous   No No   Sig: USES 5 PER DAY   lactulose (CHRONULAC) 10 GM/15ML solution 3/4/2024  No Yes   Sig: TAKE 30 MLS BY MOUTH 2 TIMES DAILY   Patient taking differently: TAKE 30 MLS BY MOUTH 3 TIMES DAILY   levofloxacin (LEVAQUIN) 500 MG tablet 3/4/2024  No Yes   Sig: Take 1 tablet (500 mg) by mouth daily for 6 days    melatonin 10 MG TABS tablet 3/3/2024  No Yes   Sig: Take 1 tablet (10 mg) by mouth nightly as needed for sleep   midodrine (PROAMATINE) 10 MG tablet 3/4/2024  No Yes   Sig: Take 1 tablet (10 mg) by mouth every 8 hours for 30 days   multivitamin w/minerals (THERA-VIT-M) tablet 3/4/2024  No Yes   Sig: TAKE 1 TABLET BY MOUTH DAILY   omeprazole (PRILOSEC) 20 MG DR capsule 3/4/2024  Yes Yes   Sig: Take 1 capsule (20 mg) by mouth 2 times daily   potassium chloride ER (KLOR-CON M) 10 MEQ CR tablet 3/4/2024  No Yes   Sig: Take 2 tablets (20 mEq) by mouth 2 times daily   rifaximin (XIFAXAN) 550 MG TABS tablet 3/4/2024  No Yes   Sig: Take 1 tablet (550 mg) by mouth 2 times daily for 180 days   spironolactone (ALDACTONE) 25 MG tablet 3/4/2024  No Yes   Sig: Take 1 tablet (25 mg) by mouth daily   thiamine (B-1) 100 MG tablet 3/4/2024  No Yes   Sig: Take 1 tablet (100 mg) by mouth daily for 30 days   thin (NO BRAND SPECIFIED) lancets   No No   Sig: Use with lanceting device. To accompany: Blood Glucose Monitor Brands: per insurance.   torsemide (DEMADEX) 10 MG tablet 3/4/2024  No Yes   Sig: Take 3 tablets (30 mg) by mouth daily as needed Take as needed for fluid once daily if systolic blood pressure (top number) is 100 or greater.   traZODone (DESYREL) 50 MG tablet 3/3/2024  No Yes   Sig: Take 0.5 tablets (25 mg) by mouth nightly as needed for sleep      Facility-Administered Medications: None     Allergies   Allergies   Allergen Reactions    Other Food Allergy Anaphylaxis     Legumes (black beans, baked beans, chickpeas)    Pineapple Itching     Physical Exam   Vital Signs: Temp: 98.8  F (37.1  C) Temp src: Bladder BP: 111/57 Pulse: 65   Resp: 16 SpO2: 96 % O2 Device: Mechanical Ventilator    Weight: 256 lbs 6.32 oz    General: Intubated and sedated  HEENT: ETT and OGT in place   Neuro: Intermittently following commands on sedation   CV: RRR   Pulm: Coarse breath sounds bilaterally, more on the left   Abd: Soft, non-distended    : Moore in place   Extremities: Warm and well perfused, 3+ pitting edema on BLE   Incisions: Covered with dressing, dry with some saturation      Data   I reviewed all medications, new labs and imaging results over the last 24 hours.    Arterial Blood Gases   Recent Labs   Lab 03/07/24 0348 03/06/24 1808 03/06/24  1235 03/06/24  0501   PH 7.45 7.46* 7.45 7.48*   PCO2 37 37 37 35   PO2 103 76* 136* 66*   HCO3 25 26 26 26     Complete Blood Count   Recent Labs   Lab 03/07/24  0812 03/07/24 0348 03/06/24 2211 03/06/24  1808   WBC 8.8 9.0 12.0* 10.7   HGB 7.9* 8.2* 8.7* 7.7*   PLT 48* 55* 80* 65*     Basic Metabolic Panel  Recent Labs   Lab 03/07/24  0904 03/07/24  0557 03/07/24  0352 03/07/24 0348 03/06/24 2216 03/06/24 2211 03/06/24  1812 03/06/24  1808 03/06/24  1441 03/06/24  1425 03/06/24  1242 03/06/24  1233   NA  --   --   --  138  --  137  --  137  --   --   --  137   POTASSIUM  --   --   --  4.2  --  4.2  --  4.2  --  4.2  --  4.2   CHLORIDE  --   --   --  103  --  103  --  103  --   --   --  104   CO2  --   --   --  24  --  24  --  24  --   --   --  25   BUN  --   --   --  37.6*  --  31.8*  --  29.2*  --   --   --  26.6*   CR  --   --   --  2.06*  --  1.81*  --  1.65*  --  1.44*  --  1.45*   * 138* 128* 139*   < > 136*   < > 133*   < >  --    < > 143*    < > = values in this interval not displayed.     Liver Function Tests  Recent Labs   Lab 03/07/24  0348 03/06/24 2211 03/06/24  1603 03/06/24  1233 03/06/24  0816 03/06/24  0500 03/05/24  2338 03/05/24  1609   *  --   --  156*  --  310*  --  1,038*   *  --   --  183*  --  294*  --  596*   ALKPHOS 56  --   --  40  --  47  --  71   BILITOTAL 1.5*  --   --  1.6*  --  2.6*  --  8.7*   ALBUMIN 2.8*  --   --  2.4*  --  2.3*  --  2.0*   INR 1.28* 1.29* 1.41* 1.59*   < > 1.60*   < > 2.12*    < > = values in this interval not displayed.     Pancreatic Enzymes  Recent Labs   Lab 03/06/24  0500 03/04/24  1058   LIPASE 5*  --    AMYLASE  22* 10*     Coagulation Profile  Recent Labs   Lab 03/07/24  0348 03/06/24  2211 03/06/24  1603 03/06/24  1233 03/06/24  0816 03/05/24  2338 03/05/24  1609 03/05/24  1519   INR 1.28* 1.29* 1.41* 1.59* 1.57*   < > 2.12* 2.12*   PTT  --   --  27  --  30  --  87* 89*    < > = values in this interval not displayed.     IMAGING:  Recent Results (from the past 24 hour(s))   US Liver Transplant Follow Up    Narrative    EXAMINATION: US LIVER TRANSPLANT FOLLOW UP, 3/6/2024 9:52 AM     COMPARISON: 3/6/2024    HISTORY: Liver transplant postop day 1, evaluate for hematoma and  vessel patency    TECHNIQUE:  Gray-scale, color Doppler and spectral flow analysis.    FINDINGS:   Large infrahepatic hematoma measuring 13.3 x 13.5 x 5.4 cm with a  drain coursing through the hematoma. Small amount of fluid in the  falciform ligament.    Liver:   The liver demonstrates normal homogeneous echotexture. No  evidence of a focal hepatic mass.     Bile Ducts: Intrahepatic biliary ducts are of normal caliber. Common  bile duct is not visualized.    Gallbladder: The gallbladder is surgically absent.    Right kidney:  The right kidney demonstrates normal echotexture with  no evidence of a shadowing stone, focal mass or hydronephrosis.   12.1  cm in long axis dimension.    Pancreas: Obscured by bowel gas.    Visualized portions of the aorta are unremarkable.    LIVER DOPPLER:  Splenic vein: Not seen  Extrahepatic portal vein:  Patent continuous antegrade flow, 41  cm/sec.  Portal vein at anastomosis: Patent continuous antegrade flow, 73  cm/sec.  Intrahepatic portal vein:  Patent continuous antegrade flow, 73  cm/sec.  Right portal vein flow is antegrade, measuring 28 cm/sec.  Left portal vein flow is antegrade, measuring 20 cm/sec.    Inferior vena cava: patent with flow toward the heart throughout..  IVC above anastomosis:  116 cm/sec.  IVC at anastomosis:  99 cm/sec.  Intrahepatic IVC:  112 cm/sec.  Extrahepatic IVC:  44 cm/sec.    Right, mid,  left hepatic veins: Patent with flow towards the inferior  vena cava.    Extrahepatic hepatic artery: Low resistance waveform with flow towards  the liver. 76 cm/sec with resistive index 0.68.  Left hepatic artery: 57 cm/sec with resistive index 0.65.  Right hepatic artery: 59 cm/sec with resistive index 0.61.      Impression    Impression:   1.  Normal Doppler evaluation of the hepatic vasculature.  2.  Hematoma inferior to the right lobe of the liver, not  significantly changed compared to prior.    NIDIA COX MD         SYSTEM ID:  [id number]   XR Abdomen Port 1 View    Narrative    XR ABDOMEN PORT 1 VIEW  3/6/2024 11:15 AM     HISTORY:  Verify small bowel feeding tube bedside placement     COMPARISON:  Abdominal radiograph 3/5/2024.    TECHNIQUE: Supine abdominal radiograph(s).    FINDINGS:   Postoperative changes of liver transplant. Biliary stent and surgical  drain in the right upper quadrant. Feeding tube tip projects over the  expected location of the distal duodenum. Gastric tube tip and  sidehole project over the stomach.    Nonobstructive bowel gas pattern. No pneumatosis or portal venous gas.  No abnormal calcifications or evidence of organomegaly.     The visualized bones and soft tissues are unremarkable.      Impression    IMPRESSION:   Feeding tube tip projects over the expected location of the distal  duodenum.      I have personally reviewed the examination and initial interpretation  and I agree with the findings.    NIDIA COX MD         SYSTEM ID:  [id number]   CT Chest w/o Contrast    Narrative    CT chest without contrast     INDICATION: Alcoholic cirrhosis post liver transplant with bilateral  pleural effusion. Reintubated postoperatively.    COMPARISON: Chest abdomen pelvis CT 2/15/2024. Plain films earlier  this morning.    FINDINGS: No contrast. Included thyroid appears unremarkable. Right IJ  catheter tip in the upper portion of the right innominate vein. Mild  to moderate bilateral  pleural effusions are new from the previous  chest CT. There is associated atelectasis as well. NG/OG tube and  feeding to both present progressing beyond the inferior margin of the  anatomical imaging set. The feeding tube appears postpyloric on the   with tip near the ligament of Treitz. Endotracheal tube also  present, its tip appears to just enter the right mainstem bronchus,  recommend retraction by approximately 15 mm.  The ascites in the upper abdomen it is similar to the CT scan last  month. Liver transplant changes noted. Probable anemic blood pool with  markedly hypodense cardiac chamber cavities compared with the  respective heart muscle. General cardiomegaly. Gynecomastia  bilaterally. Subcutaneous edema bilaterally suggestive of  malnutrition/anasarca rather diffuse edema. Apparent biliary tubing  projecting at the raul hepatis as well. No free air in the abdomen.  No pneumomediastinum. No significant subcutaneous emphysema at the  chest wall regions.   Bone detail shows patchy osteopenia with advanced degenerative or  erosive change.  There is pneumoperitoneum likely related to liver transplantation  surgery which was performed yesterday.    Detail of the lungs shows subsegmental atelectasis in the right middle  lobe. No suspicious pulmonary nodule or ectopic air collection such as  pneumothorax.      Impression    IMPRESSION:  1. Compared with 2/15/2024, new mild to moderate bilateral pleural  effusions with associated atelectasis.  2. Endotracheal tube just encroaching into the right mainstem  bronchus, consider retraction by approximately 15 mm.  3. Right IJ catheter tip in the upper portion of the right innominate  vein.  4. Liver transplant.  5. Mild upper abdominal ascites. Pneumoperitoneum, consistent with  yesterday's procedure of liver transplant.    SOULEYMANE CALL MD         SYSTEM ID:  [id number]   ANEL with Report    Narrative    Doyle Cortes MD     3/7/2024  6:59  AM  Perioperative ANEL Procedure Note    Staff -        Anesthesiologist:  Doyle Cortes MD       Performed By: anesthesiologist  Preanesthesia Checklist:  Patient identified, IV assessed, risks and   benefits discussed, monitors and equipment assessed, procedure being   performed at surgeon's request and anesthesia consent obtained.    ANEL Probe Insertion    Probe Status PRE Insertion: NO obvious damage  Probe type:  Adult 2D  Bite block used:   Soft  Insertion Technique: Easy, no oropharyngeal manipulation  Insertion complications: None obvious  Billing Report:ANEL report by Anesthesiologist (See Separate Report note)  Probe Status POST Removal: NO obvious damage      Echocardiographic and Doppler Measurements  Right Ventricle:  Cavity size normal.    Hypertrophy not present.     Thrombus not present.    Global function normal.     Left Ventricle:  Cavity size normal.    Hypertrophy not present.     Thrombus not present.   Global Function normal.   Ejection Fraction 60%.      Ventricular Regional Function:  1- Basal Anteroseptal:  normal  2- Basal Anterior:  normal  3- Basal Anterolateral:  normal  4- Basal Inferolateral:  normal  5- Basal Inferior:  normal  6- Basal Inferoseptal:  normal  7- Mid Anteroseptal:  normal  8- Mid Anterior:  normal  9- Mid Anterolateral:  normal  10- Mid Inferolateral:  normal  11- Mid Inferior:  normal  12- Mid Inferoseptal:  normal  13- Apical Anterior:  normal  14- Apical Lateral:  normal  15- Apical Inferior:  normal  16- Apical Septal:  normal  17- [place]:  normal    Valves  Aortic Valve: Annulus normal.  Stenosis not present.  Regurgitation   absent.  Leaflets normal.  Leaflet motions normal.    Mitral Valve: Annulus normal.  Stenosis not present.  Regurgitation   absent.  Leaflets normal.  Leaflet motions normal.    Tricuspid Valve: Annulus normal.  Stenosis not present.  Regurgitation   absent.  Leaflets normal.  Leaflet motions normal.    Pulmonic Valve: Annulus normal.   Stenosis not present.  Regurgitation   absent.                Post Intervention Findings  Procedure(s) performed:  Other (see comments) (liver). Global function:    Unchanged. Regional wall motion: Unchanged. Surgeon(s) notified of all   postintervention findings: Yes.                 Echocardiogram Comments

## 2024-03-08 ENCOUNTER — APPOINTMENT (OUTPATIENT)
Dept: OCCUPATIONAL THERAPY | Facility: CLINIC | Age: 54
DRG: 005 | End: 2024-03-08
Attending: STUDENT IN AN ORGANIZED HEALTH CARE EDUCATION/TRAINING PROGRAM
Payer: COMMERCIAL

## 2024-03-08 LAB
ACANTHOCYTES BLD QL SMEAR: ABNORMAL
ALBUMIN SERPL BCG-MCNC: 3 G/DL (ref 3.5–5.2)
ALBUMIN SERPL BCG-MCNC: 3.1 G/DL (ref 3.5–5.2)
ALBUMIN SERPL BCG-MCNC: 3.2 G/DL (ref 3.5–5.2)
ALP SERPL-CCNC: 75 U/L (ref 40–150)
ALT SERPL W P-5'-P-CCNC: 124 U/L (ref 0–70)
ANION GAP SERPL CALCULATED.3IONS-SCNC: 7 MMOL/L (ref 7–15)
ANION GAP SERPL CALCULATED.3IONS-SCNC: 9 MMOL/L (ref 7–15)
ANION GAP SERPL CALCULATED.3IONS-SCNC: 9 MMOL/L (ref 7–15)
AST SERPL W P-5'-P-CCNC: 59 U/L (ref 0–45)
AUER BODIES BLD QL SMEAR: ABNORMAL
BACTERIA SPEC CULT: ABNORMAL
BASO STIPL BLD QL SMEAR: ABNORMAL
BASOPHILS # BLD AUTO: 0 10E3/UL (ref 0–0.2)
BASOPHILS NFR BLD AUTO: 0 %
BILIRUB DIRECT SERPL-MCNC: 0.82 MG/DL (ref 0–0.3)
BILIRUB SERPL-MCNC: 1.3 MG/DL
BITE CELLS BLD QL SMEAR: ABNORMAL
BLISTER CELLS BLD QL SMEAR: ABNORMAL
BUN SERPL-MCNC: 31.7 MG/DL (ref 6–20)
BUN SERPL-MCNC: 32.8 MG/DL (ref 6–20)
BUN SERPL-MCNC: 36.8 MG/DL (ref 6–20)
BURR CELLS BLD QL SMEAR: ABNORMAL
CA-I BLD-MCNC: 4.5 MG/DL (ref 4.4–5.2)
CA-I BLD-MCNC: 4.6 MG/DL (ref 4.4–5.2)
CA-I BLD-MCNC: 4.7 MG/DL (ref 4.4–5.2)
CA-I BLD-MCNC: 4.7 MG/DL (ref 4.4–5.2)
CALCIUM SERPL-MCNC: 7.7 MG/DL (ref 8.6–10)
CALCIUM SERPL-MCNC: 7.8 MG/DL (ref 8.6–10)
CALCIUM SERPL-MCNC: 8.1 MG/DL (ref 8.6–10)
CHLORIDE SERPL-SCNC: 105 MMOL/L (ref 98–107)
CHLORIDE SERPL-SCNC: 105 MMOL/L (ref 98–107)
CHLORIDE SERPL-SCNC: 106 MMOL/L (ref 98–107)
CREAT SERPL-MCNC: 1.24 MG/DL (ref 0.67–1.17)
CREAT SERPL-MCNC: 1.47 MG/DL (ref 0.67–1.17)
CREAT SERPL-MCNC: 1.74 MG/DL (ref 0.67–1.17)
DACRYOCYTES BLD QL SMEAR: ABNORMAL
DEPRECATED HCO3 PLAS-SCNC: 24 MMOL/L (ref 22–29)
DEPRECATED HCO3 PLAS-SCNC: 25 MMOL/L (ref 22–29)
DEPRECATED HCO3 PLAS-SCNC: 26 MMOL/L (ref 22–29)
EGFRCR SERPLBLD CKD-EPI 2021: 47 ML/MIN/1.73M2
EGFRCR SERPLBLD CKD-EPI 2021: 57 ML/MIN/1.73M2
EGFRCR SERPLBLD CKD-EPI 2021: 70 ML/MIN/1.73M2
ELLIPTOCYTES BLD QL SMEAR: ABNORMAL
EOSINOPHIL # BLD AUTO: 0 10E3/UL (ref 0–0.7)
EOSINOPHIL NFR BLD AUTO: 0 %
ERYTHROCYTE [DISTWIDTH] IN BLOOD BY AUTOMATED COUNT: 17.7 % (ref 10–15)
ERYTHROCYTE [DISTWIDTH] IN BLOOD BY AUTOMATED COUNT: 18.2 % (ref 10–15)
ERYTHROCYTE [DISTWIDTH] IN BLOOD BY AUTOMATED COUNT: 18.6 % (ref 10–15)
FIBRINOGEN PPP-MCNC: 238 MG/DL (ref 170–490)
FRAGMENTS BLD QL SMEAR: ABNORMAL
GLUCOSE BLDC GLUCOMTR-MCNC: 103 MG/DL (ref 70–99)
GLUCOSE BLDC GLUCOMTR-MCNC: 111 MG/DL (ref 70–99)
GLUCOSE BLDC GLUCOMTR-MCNC: 122 MG/DL (ref 70–99)
GLUCOSE BLDC GLUCOMTR-MCNC: 130 MG/DL (ref 70–99)
GLUCOSE BLDC GLUCOMTR-MCNC: 133 MG/DL (ref 70–99)
GLUCOSE BLDC GLUCOMTR-MCNC: 134 MG/DL (ref 70–99)
GLUCOSE BLDC GLUCOMTR-MCNC: 140 MG/DL (ref 70–99)
GLUCOSE BLDC GLUCOMTR-MCNC: 141 MG/DL (ref 70–99)
GLUCOSE BLDC GLUCOMTR-MCNC: 145 MG/DL (ref 70–99)
GLUCOSE BLDC GLUCOMTR-MCNC: 146 MG/DL (ref 70–99)
GLUCOSE BLDC GLUCOMTR-MCNC: 149 MG/DL (ref 70–99)
GLUCOSE BLDC GLUCOMTR-MCNC: 153 MG/DL (ref 70–99)
GLUCOSE BLDC GLUCOMTR-MCNC: 156 MG/DL (ref 70–99)
GLUCOSE BLDC GLUCOMTR-MCNC: 168 MG/DL (ref 70–99)
GLUCOSE BLDC GLUCOMTR-MCNC: 201 MG/DL (ref 70–99)
GLUCOSE SERPL-MCNC: 134 MG/DL (ref 70–99)
GLUCOSE SERPL-MCNC: 153 MG/DL (ref 70–99)
GLUCOSE SERPL-MCNC: 174 MG/DL (ref 70–99)
HCT VFR BLD AUTO: 23.5 % (ref 40–53)
HCT VFR BLD AUTO: 24.3 % (ref 40–53)
HCT VFR BLD AUTO: 26.4 % (ref 40–53)
HGB BLD-MCNC: 7.9 G/DL (ref 13.3–17.7)
HGB BLD-MCNC: 8.1 G/DL (ref 13.3–17.7)
HGB BLD-MCNC: 8.7 G/DL (ref 13.3–17.7)
HGB C CRYSTALS: ABNORMAL
HOWELL-JOLLY BOD BLD QL SMEAR: ABNORMAL
IMM GRANULOCYTES # BLD: 0 10E3/UL
IMM GRANULOCYTES NFR BLD: 0 %
INR PPP: 1.32 (ref 0.85–1.15)
LYMPHOCYTES # BLD AUTO: 0.3 10E3/UL (ref 0.8–5.3)
LYMPHOCYTES NFR BLD AUTO: 3 %
MAGNESIUM SERPL-MCNC: 2.3 MG/DL (ref 1.7–2.3)
MAGNESIUM SERPL-MCNC: 2.4 MG/DL (ref 1.7–2.3)
MAGNESIUM SERPL-MCNC: 2.4 MG/DL (ref 1.7–2.3)
MCH RBC QN AUTO: 29.5 PG (ref 26.5–33)
MCH RBC QN AUTO: 30.2 PG (ref 26.5–33)
MCH RBC QN AUTO: 30.2 PG (ref 26.5–33)
MCHC RBC AUTO-ENTMCNC: 33 G/DL (ref 31.5–36.5)
MCHC RBC AUTO-ENTMCNC: 33.3 G/DL (ref 31.5–36.5)
MCHC RBC AUTO-ENTMCNC: 33.6 G/DL (ref 31.5–36.5)
MCV RBC AUTO: 88 FL (ref 78–100)
MCV RBC AUTO: 91 FL (ref 78–100)
MCV RBC AUTO: 92 FL (ref 78–100)
MONOCYTES # BLD AUTO: 0.9 10E3/UL (ref 0–1.3)
MONOCYTES NFR BLD AUTO: 10 %
NEUTROPHILS # BLD AUTO: 8.3 10E3/UL (ref 1.6–8.3)
NEUTROPHILS NFR BLD AUTO: 87 %
NEUTS HYPERSEG BLD QL SMEAR: ABNORMAL
NRBC # BLD AUTO: 0 10E3/UL
NRBC BLD AUTO-RTO: 0 /100
PATH REPORT.COMMENTS IMP SPEC: NORMAL
PATH REPORT.FINAL DX SPEC: NORMAL
PATH REPORT.GROSS SPEC: NORMAL
PATH REPORT.MICROSCOPIC SPEC OTHER STN: NORMAL
PATH REPORT.RELEVANT HX SPEC: NORMAL
PHOSPHATE SERPL-MCNC: 4.8 MG/DL (ref 2.5–4.5)
PHOSPHATE SERPL-MCNC: 4.8 MG/DL (ref 2.5–4.5)
PHOSPHATE SERPL-MCNC: 4.9 MG/DL (ref 2.5–4.5)
PHOTO IMAGE: NORMAL
PLAT MORPH BLD: ABNORMAL
PLATELET # BLD AUTO: 108 10E3/UL (ref 150–450)
PLATELET # BLD AUTO: 55 10E3/UL (ref 150–450)
PLATELET # BLD AUTO: 70 10E3/UL (ref 150–450)
POLYCHROMASIA BLD QL SMEAR: SLIGHT
POTASSIUM SERPL-SCNC: 4.1 MMOL/L (ref 3.4–5.3)
POTASSIUM SERPL-SCNC: 4.1 MMOL/L (ref 3.4–5.3)
POTASSIUM SERPL-SCNC: 4.2 MMOL/L (ref 3.4–5.3)
PROT SERPL-MCNC: 4.9 G/DL (ref 6.4–8.3)
RBC # BLD AUTO: 2.68 10E6/UL (ref 4.4–5.9)
RBC # BLD AUTO: 2.68 10E6/UL (ref 4.4–5.9)
RBC # BLD AUTO: 2.88 10E6/UL (ref 4.4–5.9)
RBC AGGLUT BLD QL: ABNORMAL
RBC MORPH BLD: ABNORMAL
ROULEAUX BLD QL SMEAR: ABNORMAL
SICKLE CELLS BLD QL SMEAR: ABNORMAL
SMUDGE CELLS BLD QL SMEAR: ABNORMAL
SODIUM SERPL-SCNC: 138 MMOL/L (ref 135–145)
SODIUM SERPL-SCNC: 139 MMOL/L (ref 135–145)
SODIUM SERPL-SCNC: 139 MMOL/L (ref 135–145)
SPHEROCYTES BLD QL SMEAR: ABNORMAL
STOMATOCYTES BLD QL SMEAR: ABNORMAL
TACROLIMUS BLD-MCNC: 1.8 UG/L (ref 5–15)
TARGETS BLD QL SMEAR: ABNORMAL
TME LAST DOSE: ABNORMAL H
TME LAST DOSE: ABNORMAL H
TOXIC GRANULES BLD QL SMEAR: ABNORMAL
VANCOMYCIN SERPL-MCNC: 11.4 UG/ML
VARIANT LYMPHS BLD QL SMEAR: ABNORMAL
WBC # BLD AUTO: 11.2 10E3/UL (ref 4–11)
WBC # BLD AUTO: 16.6 10E3/UL (ref 4–11)
WBC # BLD AUTO: 9.5 10E3/UL (ref 4–11)

## 2024-03-08 PROCEDURE — 83735 ASSAY OF MAGNESIUM: CPT

## 2024-03-08 PROCEDURE — 82565 ASSAY OF CREATININE: CPT

## 2024-03-08 PROCEDURE — 82330 ASSAY OF CALCIUM: CPT

## 2024-03-08 PROCEDURE — 85384 FIBRINOGEN ACTIVITY: CPT

## 2024-03-08 PROCEDURE — 97530 THERAPEUTIC ACTIVITIES: CPT | Mod: GO

## 2024-03-08 PROCEDURE — 99232 SBSQ HOSP IP/OBS MODERATE 35: CPT | Mod: 24 | Performed by: NURSE PRACTITIONER

## 2024-03-08 PROCEDURE — 250N000009 HC RX 250: Performed by: STUDENT IN AN ORGANIZED HEALTH CARE EDUCATION/TRAINING PROGRAM

## 2024-03-08 PROCEDURE — 250N000011 HC RX IP 250 OP 636: Performed by: TRANSPLANT SURGERY

## 2024-03-08 PROCEDURE — 90947 DIALYSIS REPEATED EVAL: CPT

## 2024-03-08 PROCEDURE — 250N000013 HC RX MED GY IP 250 OP 250 PS 637: Performed by: TRANSPLANT SURGERY

## 2024-03-08 PROCEDURE — 82330 ASSAY OF CALCIUM: CPT | Performed by: TRANSPLANT SURGERY

## 2024-03-08 PROCEDURE — 250N000011 HC RX IP 250 OP 636

## 2024-03-08 PROCEDURE — 250N000013 HC RX MED GY IP 250 OP 250 PS 637: Performed by: PHYSICIAN ASSISTANT

## 2024-03-08 PROCEDURE — 250N000012 HC RX MED GY IP 250 OP 636 PS 637: Performed by: STUDENT IN AN ORGANIZED HEALTH CARE EDUCATION/TRAINING PROGRAM

## 2024-03-08 PROCEDURE — 250N000013 HC RX MED GY IP 250 OP 250 PS 637: Performed by: STUDENT IN AN ORGANIZED HEALTH CARE EDUCATION/TRAINING PROGRAM

## 2024-03-08 PROCEDURE — 97535 SELF CARE MNGMENT TRAINING: CPT | Mod: GO

## 2024-03-08 PROCEDURE — 999N000157 HC STATISTIC RCP TIME EA 10 MIN

## 2024-03-08 PROCEDURE — 250N000009 HC RX 250

## 2024-03-08 PROCEDURE — 94003 VENT MGMT INPAT SUBQ DAY: CPT

## 2024-03-08 PROCEDURE — 200N000002 HC R&B ICU UMMC

## 2024-03-08 PROCEDURE — 80197 ASSAY OF TACROLIMUS: CPT | Performed by: STUDENT IN AN ORGANIZED HEALTH CARE EDUCATION/TRAINING PROGRAM

## 2024-03-08 PROCEDURE — 250N000011 HC RX IP 250 OP 636: Performed by: STUDENT IN AN ORGANIZED HEALTH CARE EDUCATION/TRAINING PROGRAM

## 2024-03-08 PROCEDURE — 999N000253 HC STATISTIC WEANING TRIALS

## 2024-03-08 PROCEDURE — 250N000013 HC RX MED GY IP 250 OP 250 PS 637

## 2024-03-08 PROCEDURE — 84100 ASSAY OF PHOSPHORUS: CPT

## 2024-03-08 PROCEDURE — 82248 BILIRUBIN DIRECT: CPT

## 2024-03-08 PROCEDURE — 85025 COMPLETE CBC W/AUTO DIFF WBC: CPT | Performed by: TRANSPLANT SURGERY

## 2024-03-08 PROCEDURE — 80202 ASSAY OF VANCOMYCIN: CPT | Performed by: TRANSPLANT SURGERY

## 2024-03-08 PROCEDURE — 97165 OT EVAL LOW COMPLEX 30 MIN: CPT | Mod: GO

## 2024-03-08 PROCEDURE — 99233 SBSQ HOSP IP/OBS HIGH 50: CPT | Mod: FS

## 2024-03-08 PROCEDURE — 999N000259 HC STATISTIC EXTUBATION

## 2024-03-08 PROCEDURE — 99291 CRITICAL CARE FIRST HOUR: CPT | Mod: 24 | Performed by: SURGERY

## 2024-03-08 PROCEDURE — 258N000003 HC RX IP 258 OP 636: Performed by: TRANSPLANT SURGERY

## 2024-03-08 PROCEDURE — 85610 PROTHROMBIN TIME: CPT | Performed by: TRANSPLANT SURGERY

## 2024-03-08 PROCEDURE — 85027 COMPLETE CBC AUTOMATED: CPT

## 2024-03-08 RX ORDER — QUETIAPINE FUMARATE 25 MG/1
25 TABLET, FILM COATED ORAL EVERY 6 HOURS PRN
Status: DISCONTINUED | OUTPATIENT
Start: 2024-03-08 | End: 2024-03-09

## 2024-03-08 RX ORDER — BISACODYL 10 MG
10 SUPPOSITORY, RECTAL RECTAL DAILY
Status: DISCONTINUED | OUTPATIENT
Start: 2024-03-08 | End: 2024-03-09

## 2024-03-08 RX ORDER — BUMETANIDE 0.25 MG/ML
2 INJECTION INTRAMUSCULAR; INTRAVENOUS ONCE
Status: COMPLETED | OUTPATIENT
Start: 2024-03-08 | End: 2024-03-08

## 2024-03-08 RX ORDER — BUMETANIDE 0.25 MG/ML
2 INJECTION INTRAMUSCULAR; INTRAVENOUS ONCE
Qty: 8 ML | Refills: 0 | Status: COMPLETED | OUTPATIENT
Start: 2024-03-08 | End: 2024-03-08

## 2024-03-08 RX ORDER — AMINO ACIDS/PROTEIN HYDROLYS 11G-40/45
1 LIQUID IN PACKET (ML) ORAL 4 TIMES DAILY
Status: DISCONTINUED | OUTPATIENT
Start: 2024-03-08 | End: 2024-03-11

## 2024-03-08 RX ORDER — PREDNISONE 5 MG/1
5 TABLET ORAL DAILY
Status: DISCONTINUED | OUTPATIENT
Start: 2024-03-11 | End: 2024-03-16

## 2024-03-08 RX ADMIN — URSODIOL 300 MG: 300 CAPSULE ORAL at 07:45

## 2024-03-08 RX ADMIN — OXYCODONE HYDROCHLORIDE 10 MG: 10 TABLET ORAL at 11:04

## 2024-03-08 RX ADMIN — CALCIUM CHLORIDE, MAGNESIUM CHLORIDE, SODIUM CHLORIDE, SODIUM BICARBONATE, POTASSIUM CHLORIDE AND SODIUM PHOSPHATE DIBASIC DIHYDRATE 12.5 ML/KG/HR: 3.68; 3.05; 6.34; 3.09; .314; .187 INJECTION INTRAVENOUS at 21:28

## 2024-03-08 RX ADMIN — VALGANCICLOVIR HYDROCHLORIDE 450 MG: 50 POWDER, FOR SOLUTION ORAL at 19:54

## 2024-03-08 RX ADMIN — CALCIUM CHLORIDE, MAGNESIUM CHLORIDE, SODIUM CHLORIDE, SODIUM BICARBONATE, POTASSIUM CHLORIDE AND SODIUM PHOSPHATE DIBASIC DIHYDRATE 12.5 ML/KG/HR: 3.68; 3.05; 6.34; 3.09; .314; .187 INJECTION INTRAVENOUS at 14:49

## 2024-03-08 RX ADMIN — POLYETHYLENE GLYCOL 3350 17 G: 17 POWDER, FOR SOLUTION ORAL at 07:43

## 2024-03-08 RX ADMIN — CALCIUM CHLORIDE, MAGNESIUM CHLORIDE, SODIUM CHLORIDE, SODIUM BICARBONATE, POTASSIUM CHLORIDE AND SODIUM PHOSPHATE DIBASIC DIHYDRATE 12.5 ML/KG/HR: 3.68; 3.05; 6.34; 3.09; .314; .187 INJECTION INTRAVENOUS at 04:20

## 2024-03-08 RX ADMIN — DEXMEDETOMIDINE HYDROCHLORIDE 1.2 MCG/KG/HR: 400 INJECTION INTRAVENOUS at 01:03

## 2024-03-08 RX ADMIN — CALCIUM CHLORIDE, MAGNESIUM CHLORIDE, SODIUM CHLORIDE, SODIUM BICARBONATE, POTASSIUM CHLORIDE AND SODIUM PHOSPHATE DIBASIC DIHYDRATE 12.5 ML/KG/HR: 3.68; 3.05; 6.34; 3.09; .314; .187 INJECTION INTRAVENOUS at 11:22

## 2024-03-08 RX ADMIN — CALCIUM CHLORIDE, MAGNESIUM CHLORIDE, SODIUM CHLORIDE, SODIUM BICARBONATE, POTASSIUM CHLORIDE AND SODIUM PHOSPHATE DIBASIC DIHYDRATE 12.5 ML/KG/HR: 3.68; 3.05; 6.34; 3.09; .314; .187 INJECTION INTRAVENOUS at 01:08

## 2024-03-08 RX ADMIN — CALCIUM CHLORIDE, MAGNESIUM CHLORIDE, SODIUM CHLORIDE, SODIUM BICARBONATE, POTASSIUM CHLORIDE AND SODIUM PHOSPHATE DIBASIC DIHYDRATE 12.5 ML/KG/HR: 3.68; 3.05; 6.34; 3.09; .314; .187 INJECTION INTRAVENOUS at 21:27

## 2024-03-08 RX ADMIN — PIPERACILLIN AND TAZOBACTAM 3.38 G: 3; .375 INJECTION, POWDER, FOR SOLUTION INTRAVENOUS at 02:16

## 2024-03-08 RX ADMIN — OXYCODONE HYDROCHLORIDE 10 MG: 10 TABLET ORAL at 04:12

## 2024-03-08 RX ADMIN — CALCIUM CHLORIDE, MAGNESIUM CHLORIDE, SODIUM CHLORIDE, SODIUM BICARBONATE, POTASSIUM CHLORIDE AND SODIUM PHOSPHATE DIBASIC DIHYDRATE 12.5 ML/KG/HR: 3.68; 3.05; 6.34; 3.09; .314; .187 INJECTION INTRAVENOUS at 18:18

## 2024-03-08 RX ADMIN — DEXMEDETOMIDINE HYDROCHLORIDE 1.2 MCG/KG/HR: 400 INJECTION INTRAVENOUS at 04:12

## 2024-03-08 RX ADMIN — FLUCONAZOLE IN SODIUM CHLORIDE 400 MG: 2 INJECTION, SOLUTION INTRAVENOUS at 10:57

## 2024-03-08 RX ADMIN — Medication 50 MCG: at 08:10

## 2024-03-08 RX ADMIN — THIAMINE HCL TAB 100 MG 100 MG: 100 TAB at 07:43

## 2024-03-08 RX ADMIN — Medication 15 ML: at 13:08

## 2024-03-08 RX ADMIN — CALCIUM CHLORIDE, MAGNESIUM CHLORIDE, SODIUM CHLORIDE, SODIUM BICARBONATE, POTASSIUM CHLORIDE AND SODIUM PHOSPHATE DIBASIC DIHYDRATE 12.5 ML/KG/HR: 3.68; 3.05; 6.34; 3.09; .314; .187 INJECTION INTRAVENOUS at 08:05

## 2024-03-08 RX ADMIN — TACROLIMUS 2 MG: 5 CAPSULE ORAL at 18:33

## 2024-03-08 RX ADMIN — URSODIOL 300 MG: 300 CAPSULE ORAL at 19:55

## 2024-03-08 RX ADMIN — BUMETANIDE 2 MG: 0.25 INJECTION INTRAMUSCULAR; INTRAVENOUS at 14:49

## 2024-03-08 RX ADMIN — Medication 1 PACKET: at 13:08

## 2024-03-08 RX ADMIN — PIPERACILLIN AND TAZOBACTAM 3.38 G: 3; .375 INJECTION, POWDER, FOR SOLUTION INTRAVENOUS at 14:50

## 2024-03-08 RX ADMIN — OXYCODONE HYDROCHLORIDE 10 MG: 10 TABLET ORAL at 20:58

## 2024-03-08 RX ADMIN — OXYCODONE HYDROCHLORIDE 10 MG: 10 TABLET ORAL at 16:34

## 2024-03-08 RX ADMIN — Medication 40 MG: at 07:45

## 2024-03-08 RX ADMIN — CHLORHEXIDINE GLUCONATE 15 ML: 1.2 SOLUTION ORAL at 07:44

## 2024-03-08 RX ADMIN — CALCIUM CHLORIDE, MAGNESIUM CHLORIDE, SODIUM CHLORIDE, SODIUM BICARBONATE, POTASSIUM CHLORIDE AND SODIUM PHOSPHATE DIBASIC DIHYDRATE: 3.68; 3.05; 6.34; 3.09; .314; .187 INJECTION INTRAVENOUS at 16:41

## 2024-03-08 RX ADMIN — CALCIUM CHLORIDE, MAGNESIUM CHLORIDE, SODIUM CHLORIDE, SODIUM BICARBONATE, POTASSIUM CHLORIDE AND SODIUM PHOSPHATE DIBASIC DIHYDRATE 12.5 ML/KG/HR: 3.68; 3.05; 6.34; 3.09; .314; .187 INJECTION INTRAVENOUS at 01:09

## 2024-03-08 RX ADMIN — Medication 1 PACKET: at 07:49

## 2024-03-08 RX ADMIN — Medication 1 PACKET: at 16:29

## 2024-03-08 RX ADMIN — Medication 1 PACKET: at 21:35

## 2024-03-08 RX ADMIN — DEXMEDETOMIDINE HYDROCHLORIDE 1.2 MCG/KG/HR: 400 INJECTION INTRAVENOUS at 07:43

## 2024-03-08 RX ADMIN — PIPERACILLIN AND TAZOBACTAM 3.38 G: 3; .375 INJECTION, POWDER, FOR SOLUTION INTRAVENOUS at 19:45

## 2024-03-08 RX ADMIN — PIPERACILLIN AND TAZOBACTAM 3.38 G: 3; .375 INJECTION, POWDER, FOR SOLUTION INTRAVENOUS at 07:44

## 2024-03-08 RX ADMIN — SULFAMETHOXAZOLE AND TRIMETHOPRIM 1 TABLET: 400; 80 TABLET ORAL at 19:54

## 2024-03-08 RX ADMIN — TACROLIMUS 2 MG: 5 CAPSULE ORAL at 07:45

## 2024-03-08 RX ADMIN — VANCOMYCIN HYDROCHLORIDE 1250 MG: 10 INJECTION, POWDER, LYOPHILIZED, FOR SOLUTION INTRAVENOUS at 09:22

## 2024-03-08 RX ADMIN — BUMETANIDE 2 MG: 0.25 INJECTION INTRAMUSCULAR; INTRAVENOUS at 20:03

## 2024-03-08 RX ADMIN — DOCUSATE SODIUM 50 MG AND SENNOSIDES 8.6 MG 2 TABLET: 8.6; 5 TABLET, FILM COATED ORAL at 07:43

## 2024-03-08 RX ADMIN — METHYLPREDNISOLONE SODIUM SUCCINATE 50 MG: 125 INJECTION, POWDER, FOR SOLUTION INTRAMUSCULAR; INTRAVENOUS at 07:43

## 2024-03-08 RX ADMIN — Medication 25 MG: at 23:52

## 2024-03-08 RX ADMIN — MYCOPHENOLATE MOFETIL 750 MG: 200 POWDER, FOR SUSPENSION ORAL at 07:45

## 2024-03-08 RX ADMIN — MYCOPHENOLATE MOFETIL 750 MG: 200 POWDER, FOR SUSPENSION ORAL at 18:33

## 2024-03-08 ASSESSMENT — ACTIVITIES OF DAILY LIVING (ADL)
ADLS_ACUITY_SCORE: 36

## 2024-03-08 NOTE — PROGRESS NOTES
CLINICAL NUTRITION SERVICES - BRIEF NOTE     Nutrition Prescription     RECOMMENDATIONS FOR MDs/PROVIDERS TO ORDER:  Total daily fluids/adjustments per MD     Recommendations already ordered by Registered Dietitian (RD):  Transition to lower-phos formula & increase protein provisions:   Novasource Renal (or equivalent) @ 40 ml/hr (960 ml) + 4 packs ProSource TF20 provides 2240 kcal, 167 g pro, 176 g CHO, 688 ml free water, and 0 g fiber daily.      Future/Additional Recommendations:  CRRT status, K/phos trends   Continue to monitor additional nutrition-related findings and follow pt per protocol   For last full RD assessment, see note dated 3/4/24    NEW FINDINGS   CRRT initiated 3/7, adjusting estimated protein needs:     ASSESSED NUTRITION NEEDS   Dosing Weight: 79 kg (adj wt based on IBW of 70 kg and actual wt of 107.2 kg)     Estimated Energy Needs: 9995-3729 kcals/day (25-30 kcals/kg)   Justification: Increased needs s/p potential liver tx   Estimated Protein Needs: 158 - 198 grams protein/day (1.5 - 2 grams of pro/kg)   Justification: Increased needs w/ CRRT   Estimated Fluid Needs: per MD pending fluid status     Phos improved after initiation of CRRT, remains elevated - will transition to lower phos formula    3/6/2024  2:25 PM 3/7/2024  3:48 AM 3/7/2024  3:25 PM 3/7/2024  8:36 PM 3/8/2024  3:58 AM   Phosphorus   4.7 (H)  5.7 (H)  5.7 (H)  5.4 (H)  4.9 (H)         INTERVENTIONS  Implementation  Collaboration with other providers  Enteral Nutrition - Modify composition     Monitoring/Evaluation  Will continue to monitor and evaluate per protocol.    Kiera Ribeiro, MPH, RDN, LD  4E (SICU) RD pager: [phone] or Hilda [4E Clinical Dietitian]   Weekend/Holiday RD pager [phone]

## 2024-03-08 NOTE — PROGRESS NOTES
[id number]   Appointment Info   Signing Clinician's Name / Credentials (OT) Sammi Cloud OTR/L   Rehab Comments (OT) Abdominal precautions   Living Environment   People in Home spouse   Current Living Arrangements   ([facility])   Home Accessibility stairs within home   Number of Stairs, Within Home, Primary seven   Stair Railings, Within Home, Primary railings safe and in good condition   Transportation Anticipated family or friend will provide   Living Environment Comments Pt lives with spouse in [facility]. No BENNIE from garage. Split-level entry. Bathroom/bedroom on lower level. Tub shower, elevated toilet seat, no GB, has shower chair.   Self-Care   Usual Activity Tolerance good   Current Activity Tolerance fair   Equipment Currently Used at Home none   Fall history within last six months yes   Number of times patient has fallen within last six months 2  (per chart)   Activity/Exercise/Self-Care Comment Wife assists with LBD, medication management at baseline. Pt ambulating without AD and showering self independently. Wife works. Pt reports he enjoys fishing.   Instrumental Activities of Daily Living (IADL)   IADL Comments Has an active drivers license but hasn't driven since 2023, also hasn't worked as a  since then.   General Information   Onset of Illness/Injury or Date of Surgery 24   Referring Physician Estiven Martel MD   Patient/Family Therapy Goal Statement (OT) pt did not state specific goals   Additional Occupational Profile Info/Pertinent History of Current Problem Mary Lopez is a 53 year old male admitted on 3/4/2024. He has a history of alcoholic cirrhosis complicated by hepatic encephalopathy and suspected SBP who presented to the hospital for consideration of  donor liver transplant. He was recently admitted for S.bovis bacteremia and was discharged on amoxicillin and levofloxacin. He underwent DDLT on 3/5/2024 and was admitted to the SICU  following his surgery for ventilator management and hemodynamic monitoring.   Existing Precautions/Restrictions fall   Left Upper Extremity (Weight-bearing Status)   (< 10#)   Right Upper Extremity (Weight-bearing Status)   (< 10 #)   General Observations and Info on CRRT, TIMOTHY drain, many lines   Cognitive Status Examination   Orientation Status person   Cognitive Status Comments Pt oriented to self only. Did not know he was in the hospital, thought it was October, did not know why he was in the hospital. Very confused during tx. At times, not following commands which lead to variable need for assist. Pt cued to not pull at lines. Chair alam on and wife present. Will continue to assess cognition.   Visual Perception   Impact of Vision Impairment on Function (Vision) no acute changes   Sensory   Sensory Quick Adds sensation intact   Edema General Information   General Comments/Previous Edema Treatment/Edema Equipment Previously had edema wraps but could not tolerate. Pt with significant generalized edema, 2+/3+, LEs/UEs. Will monitor.   Range of Motion Comprehensive   Comment, General Range of Motion Pt has R shoulder injury, PROM R UE WFL, AROM L UE WNL   Strength Comprehensive (MMT)   Comment, General Manual Muscle Testing (MMT) Assessment R UE < 3/5   Coordination   Coordination Comments Demonstrates opposition without difficulty   Bed Mobility   Comment (Bed Mobility) Min-mod A x 1, at times not following commands as instructed   Transfers   Transfer Comments CGA STS A x 1, requires A x 1 for copious line management   Bathing Assessment/Intervention   [place] Level (Bathing) dependent (less than 25% patient effort)   Lower Body Dressing Assessment/Training   [place] Level (Lower Body Dressing) dependent (less than 25% patient effort)   Toileting   Comment, (Toileting) A x 2 if pt pivoting to commode at this time for safety   Clinical Impression   Criteria for Skilled Therapeutic Interventions Met (OT)  Yes, treatment indicated   OT Diagnosis Decreased ADL I   OT Problem List-Impairments impacting ADL problems related to;activity tolerance impaired;mobility;pain;post-surgical precautions;cognition   Assessment of Occupational Performance 5 or more Performance Deficits   Identified Performance Deficits dressing, g/h, toileting, bathing, functional transfers, mobility   Planned Therapy Interventions (OT) ADL retraining;bed mobility training;cognition;ROM;transfer training;home program guidelines;progressive activity/exercise;risk factor education   Clinical Decision Making Complexity (OT) problem focused assessment/low complexity   Risk & Benefits of therapy have been explained evaluation/treatment results reviewed;care plan/treatment goals reviewed   Clinical Impression Comments Pt to benefit from skilled OT to address above deficits. See daily flowsheet for details regarding tx provided.   OT Total Evaluation Time   OT Eval, Low Complexity Minutes (33980) 6   OT Goals   Therapy Frequency (OT) 5 times/week   OT Predicted Duration/Target Date for Goal Attainment 03/22/24   OT: Hygiene/Grooming modified independent   OT: Upper Body Dressing Modified independent   OT: Lower Body Dressing Minimal assist   OT: Cognitive Patient/caregiver will verbalize understanding of cognitive assessment results/recommendations as needed for safe discharge planning   Total Session Time   Total Session Time (sum of timed and untimed services) 6

## 2024-03-08 NOTE — PLAN OF CARE
Major Shift Events:   Neuro: Pupils reactive and equal. Moves all extremities to stimuli; intermittently follows commands. Agitated at times and tries to pull at ET tube. Precedex and Fentanyl running for agitation and pain control.  CV: Afebrile. SR 60s. BP stable; no pressors. 3+ edema throughout body.   Resp: CMV 40%/500/5/16. Diminished lung sounds. Failed pressure support trial this AM.   GI/: Tube feeding at goal 40 ml/hr. Moore in place; minimal output. CRRT started around 1500 today.   No BM this shift  Incision to abdomen clean/dry/intact. TIMOTHY drain to gravity; output dark red around 200 ml q2h.   Insulin drip running. Algorithm 4   2 units platelets given     Plan: Continue to monitor patient     For vital signs and complete assessments, please see documentation flowsheets.

## 2024-03-08 NOTE — PROGRESS NOTES
[facility]   Transplant Nephrology Progress Note  Date of Admission:  3/4/2024  Today's Date: 03/08/2024    Recommendations:  - Would give Bumex 2 mg IV and assess response.  - Continue CRRT for now to maximize fluid removal while avoiding hypotension.  - Increase net fluid removal goal on CRRT up to 200 ml/hr (ordered).  - Recommend decreasing tacrolimus goal to 6-8.    Assessment & Plan   # ANGEL: Increased serum creatinine post OLT.   - ANGEL felt secondary to volume shifts with liver transplant.   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  Transaminases Trend down.  Followed by Transplant Surgery.   - 3/6/24 Transplant Renal Ultrasound:  Hematoma inferior to the right lobe of the liver     # Immunosuppression: Tacrolimus immediate release (goal 8-12), Mycophenolate mofetil (dose 750 mg every 12 hours), and Methylprednisolone (dose taper)   - Induction with Recent Transplant:  Per Liver Tx Protocol   - Continue with intensive monitoring of immunosuppression for efficacy and toxicity.   - Goal tacrolimus level lower due to ANGEL.   Recommend decreasing tacrolimus goal to 6-8.   - Changes: Management per Transplant Surgery.    # Infection Prophylaxis:   - PJP: Sulfa/TMP (Bactrim)  - CMV: Valganciclovir (Valcyte), CMV IgG Ab negative, follow-up donor CMV status.  - Thrush: Nystatin (Mycostatin) swish and swallow and Fluconazole (Diflucan)  - Fungal: Fluconazole (Diflucan)    # Blood Pressure: Controlled;  Goal BP: MAP > 65   - Volume status: Total body volume up, but intravascularly euvolemic     - Changes: Yes - Increase net fluid removal goal on CRRT to 200 ml/hr.     # Type 2 Diabetes: Controlled (HbA1c <7%) Last HbA1c: 4.8%   - Management as per primary team.  On insulin gtt.    # Anemia in Chronic Disease/Surgery: Hgb: Trend down      MAITE:  No   - Iron studies: Replete (12/2023)    # Thrombocytopenia: Trend down.  Receiving platelets infusion today.    # Mineral Bone Disorder:   - Vitamin D; level:  26 (12/2023)           On supplement: No  - Calcium; level: Normal           On supplement: No  - Phosphorus; level: High, Modulate with dialysis       On supplement: No    # Electrolytes:   - Potassium; level: Normal         On supplement: No  - Magnesium; level: High normal        On supplement: No  - Bicarbonate; level: Normal         On supplement: No  - Sodium; level: Normal    # Transplant History:  Etiology of Organ Failure: Alcohol-related liver disease  Tx: Liver Tx  Transplant: 3/5/2024 (Liver)  Significant changes in immunosuppression: Slightly lower tacrolimus level goal due to ANGEL.  Significant transplant-related complications: None      Recommendations were communicated to the primary team verbally.    Seen and discussed with Dr. Sanchez.    LESA Camejo CNP   Pager: [phone]    Physician Attestation     I saw and evaluated Mary Lopez as part of a shared APRN/PA visit.     I personally reviewed the vital signs, medications, and labs.    I personally provided a substantive portion of care for this patient and I approve the care plan as written by the JOSELYN.  I was involved with Medical Decision Making including: Please see A&P for additional details of medical decision making.  Will continue on CRRT with ability to pull more net volume off.  Would recommend targeting tacrolimus at 6-8.    Flaco Sanchez MD  Date of Service (when I saw the patient): 03/08/24    Interval History   Mr. Lopez's creatinine is 1.74 (03/08 0358); as expected with CRRT.   I/O last 3 completed shifts:  In: 3431.54 [I.V.:1605.54; NG/GT:510]  Out: 4120 [Urine:431; Drains:1775; Other:1914]     Other significant labs/tests/vitals: SBP 110s - 120s overnight. Afebrile.  , AST 59, Alk phos 75.  Per nursing note, , filter clotted @ 0535.  CRRT restarted @ 0610.  3+ leg swelling.    Review of Systems   Unable because patient is intubated and sedated    MEDICATIONS:   [Held by provider] aspirin  325 mg Oral or Feeding Tube Daily    chlorhexidine  15 mL Mouth/Throat Q12H    fluconazole  400 mg Intravenous Q24H    methylPREDNISolone  50 mg Intravenous Once    Followed by    [START ON 3/9/2024] predniSONE  25 mg Oral Once    Followed by    [START ON 3/10/2024] predniSONE  10 mg Oral Once    multivitamins w/minerals  15 mL Per Feeding Tube Daily    mycophenolate  750 mg Oral BID IS    Or    mycophenolate  750 mg Oral or NG Tube BID IS    [START ON 3/13/2024] nystatin  10 mL Swish & Swallow 4x Daily    pantoprazole  40 mg Per Feeding Tube QAM AC    piperacillin-tazobactam  3.375 g Intravenous Q6H    polyethylene glycol  17 g Oral or Feeding Tube BID    protein modular  1 packet Per Feeding Tube BID    senna-docusate  1-2 tablet Oral or Feeding Tube BID    sodium chloride (PF)  3 mL Intravenous Q8H    sulfamethoxazole-trimethoprim  1 tablet Oral or Feeding Tube QPM    tacrolimus  2 mg Oral BID IS    Or    tacrolimus  2 mg Oral or NG Tube BID IS    thiamine  100 mg Oral or Feeding Tube Daily    ursodiol  300 mg Oral BID    Or    ursodiol  300 mg Oral or NG Tube BID    valGANciclovir  450 mg Oral QPM    Or    valGANciclovir  450 mg Oral or NG Tube QPM    vancomycin  1,250 mg Intravenous Q24H      dexmedeTOMIDine 1.2 mcg/kg/hr (24 0600)    dextrose      dextrose      CRRT replacement solution 12.5 mL/kg/hr (24 0420)    fentaNYL 150 mcg/hr (24 06)    insulin regular 2 Units/hr (24 0630)    - MEDICATION INSTRUCTIONS -      CRRT replacement solution 200 mL/hr at 24 1449    CRRT replacement solution 12.5 mL/kg/hr (24 0420)    BETA BLOCKER NOT PRESCRIBED         Physical Exam   Temp  Av.1  F (36.7  C)  Min: 96.3  F (35.7  C)  Max: 100.2  F (37.9  C)  Arterial Line BP  Min: 85/53  Max: 141/72  Arterial Line MAP (mmHg)  Avg:  "73.3 mmHg  Min: 59 mmHg  Max: 103 mmHg      Pulse  Av.6  Min: 50  Max: 129 Resp  Av.5  Min: 11  Max: 25  FiO2 (%)  Av.8 %  Min: 40 %  Max: 60 %  SpO2  Av.9 %  Min: 88 %  Max: 100 %    CVP (mmHg): (S) 17 mmHg (Transplant notified)/57   Pulse 57   Temp 97.7  F (36.5  C)   Resp 16   Ht 1.73 m (5' 8.11\")   Wt 115 kg (253 lb 8.5 oz)   SpO2 99%   BMI 38.42 kg/m     Date 24 07 - 24 0659   Shift 7374-4898 5131-6450 3043-3944 24 Hour Total   INTAKE   I.V. 52.2   52.2   Enteral 40   40   Shift Total(mL/kg) 92.2(0.8)   92.2(0.8)   OUTPUT   Other 124   124   Shift Total(mL/kg) 124(1.08)   124(1.08)   Weight (kg) 115 115 115 115      Admit Weight: 107.2 kg (236 lb 4.8 oz)     GENERAL APPEARANCE: awakens to voice  HENT: intubated  RESP: clear, diminished at bases  CV: regular rhythm, normal rate, no rub, no murmur  EDEMA: 3+ LE edema bilaterally  ABDOMEN: distended, soft, TIMOTHY drain in place.  MS: extremities normal - no gross deformities noted, no evidence of inflammation in joints, no muscle tenderness  SKIN: no rash  DIALYSIS ACCESS:  Temporary catheter    Data   All labs reviewed by me.  CMP  Recent Labs   Lab 24  0617 24  0358 24  0158 24  2218 24  2036 24  1614 24  1525 24  0352 24  0348 24  1242 24  1233 24  0503 24  0500   NA  --  139  --   --  138  --  138  --  138   < > 137  --  136   POTASSIUM  --  4.1  --   --  4.3  --  4.2  --  4.2   < > 4.2  --  4.0   CHLORIDE  --  105  --   --  104  --  103  --  103   < > 104  --  104   CO2  --  25  --   --  22  --  24  --  24   < > 25  --  25   ANIONGAP  --  9  --   --  12  --  11  --  11   < > 8  --  7   * 130*  134* 133*   < > 159*   < > 178*   < > 139*   < > 143*   < > 154*   BUN  --  36.8*  --   --  39.4*  --  45.3*  --  37.6*   < > 26.6*  --  23.6*   CR  --  1.74*  --   --  1.98*  --  2.35*  --  2.06*   < > 1.45*  --  1.11   GFRESTIMATED  --  47*  " --   --  40*  --  32*  --  38*   < > 58*  --  80   MEG  --  8.1*  --   --  8.4*  --  8.3*  --  8.8   < > 8.8  --  8.8   MAG  --  2.4*  --   --  2.4*  --  2.3  --  2.2   < >  --   --  1.6*   PHOS  --  4.9*  --   --  5.4*  --  5.7*  --  5.7*   < >  --   --  3.8   PROTTOTAL  --  4.9*  --   --   --   --   --   --  4.5*  --  3.7*  --  3.6*   ALBUMIN  --  3.1*  --   --  3.1*  --   --   --  2.8*  --  2.4*  --  2.3*   BILITOTAL  --  1.3*  --   --   --   --   --   --  1.5*  --  1.6*  --  2.6*   ALKPHOS  --  75  --   --   --   --   --   --  56  --  40  --  47   AST  --  59*  --   --   --   --   --   --  109*  --  156*  --  310*   ALT  --  124*  --   --   --   --   --   --  152*  --  183*  --  294*    < > = values in this interval not displayed.     CBC  Recent Labs   Lab 03/08/24 0358 03/07/24  1429 03/07/24  0812 03/07/24  0348   HGB 7.9* 7.9* 7.9* 8.2*   WBC 9.5 8.3 8.8 9.0   RBC 2.68* 2.65* 2.62* 2.71*   HCT 23.5* 22.9* 22.3* 23.0*   MCV 88 86 85 85   MCH 29.5 29.8 30.2 30.3   MCHC 33.6 34.5 35.4 35.7   RDW 17.7* 16.6* 15.9* 15.6*   PLT 55* 65* 48* 55*     INR  Recent Labs   Lab 03/08/24 0358 03/07/24 0348 03/06/24  2211 03/06/24  1603 03/06/24  1233 03/06/24  0816 03/05/24  2338 03/05/24  1609 03/05/24  1519   INR 1.32* 1.28* 1.29* 1.41*   < > 1.57*   < > 2.12* 2.12*   PTT  --   --   --  27  --  30  --  87* 89*    < > = values in this interval not displayed.     ABG  Recent Labs   Lab 03/07/24  0348 03/06/24  1808 03/06/24  1235 03/06/24  0501   PH 7.45 7.46* 7.45 7.48*   PCO2 37 37 37 35   PO2 103 76* 136* 66*   HCO3 25 26 26 26   O2PER 50 60 60 50      Urine Studies  Recent Labs   Lab Test 03/04/24  1042 02/23/24  1558 02/15/24  2015 11/09/23  1147   COLOR Yellow Yellow Dark Yellow* Yellow   APPEARANCE Clear Clear Clear Clear   URINEGLC 500* Negative Negative >=1000*   URINEBILI Negative Small* Small* Small*   URINEKETONE Negative Negative Negative Negative   SG 1.007 1.009 1.014 <=1.005   UBLD Negative Negative  Negative Negative   URINEPH 5.0 5.0 5.0 6.0   PROTEIN Negative Negative Negative Negative   UROBILINOGEN  --   --   --  2.0*   NITRITE Negative Negative Negative Negative   LEUKEST Negative Negative Negative Negative   RBCU <1 <1 <1 0-2   WBCU <1 <1 <1 0-5     No lab results found.  PTH  No lab results found.  Iron Studies  Recent Labs   Lab Test 12/19/23  0758 08/23/23  1018 08/09/23  1122 07/15/23  1048   IRON 135 120 118 92   KE 2,948* 4,261* 4,611*  --        IMAGING:  All imaging studies reviewed by me.

## 2024-03-08 NOTE — PROGRESS NOTES
[id number]   Appointment Info   Signing Clinician's Name / Credentials (OT) Sammi Cloud OTR/L   Rehab Comments (OT) Abdominal precautions   Living Environment   People in Home spouse   Current Living Arrangements   ([facility])   Home Accessibility stairs within home   Number of Stairs, Within Home, Primary seven   Stair Railings, Within Home, Primary railings safe and in good condition   Transportation Anticipated family or friend will provide   Living Environment Comments Pt lives with spouse in [facility]. No BENNIE from garage. Split-level entry. Bathroom/bedroom on lower level. Tub shower, elevated toilet seat, no GB, has shower chair.   Self-Care   Usual Activity Tolerance good   Current Activity Tolerance fair   Equipment Currently Used at Home none   Fall history within last six months yes   Number of times patient has fallen within last six months 2  (per chart)   Activity/Exercise/Self-Care Comment Wife assists with LBD, medication management at baseline. Pt ambulating without AD and showering self independently. Wife works. Pt reports he enjoys fishing.   Instrumental Activities of Daily Living (IADL)   IADL Comments Has an active drivers license but hasn't driven since 2023, also hasn't worked as a  since then.   General Information   Onset of Illness/Injury or Date of Surgery 24   Referring Physician Estiven Martel MD   Patient/Family Therapy Goal Statement (OT) pt did not state specific goals   Additional Occupational Profile Info/Pertinent History of Current Problem Mary Lopez is a 53 year old male admitted on 3/4/2024. He has a history of alcoholic cirrhosis complicated by hepatic encephalopathy and suspected SBP who presented to the hospital for consideration of  donor liver transplant. He was recently admitted for S.bovis bacteremia and was discharged on amoxicillin and levofloxacin. He underwent DDLT on 3/5/2024 and was admitted to the SICU  following his surgery for ventilator management and hemodynamic monitoring.   Existing Precautions/Restrictions fall   Left Upper Extremity (Weight-bearing Status)   (< 10#)   Right Upper Extremity (Weight-bearing Status)   (< 10 #)   General Observations and Info on CRRT, TIMOTHY drain, many lines   Cognitive Status Examination   Orientation Status person   Cognitive Status Comments Pt oriented to self only. Did not know he was in the hospital, thought it was October, did not know why he was in the hospital. Very confused during tx. At times, not following commands which lead to variable need for assist. Pt cued to not pull at lines. Chair alam on and wife present. Will continue to assess cognition.   Visual Perception   Impact of Vision Impairment on Function (Vision) no acute changes   Sensory   Sensory Quick Adds sensation intact   Edema General Information   General Comments/Previous Edema Treatment/Edema Equipment Previously had edema wraps but could not tolerate. Pt with significant generalized edema, 2+/3+, LEs/UEs. Will monitor.   Range of Motion Comprehensive   Comment, General Range of Motion Pt has R shoulder injury, PROM R UE WFL, AROM L UE WNL   Strength Comprehensive (MMT)   Comment, General Manual Muscle Testing (MMT) Assessment R UE < 3/5   Coordination   Coordination Comments Demonstrates opposition without difficulty   Bed Mobility   Comment (Bed Mobility) Min-mod A x 1, at times not following commands as instructed   Transfers   Transfer Comments CGA STS A x 1, requires A x 1 for copious line management   Bathing Assessment/Intervention   [place] Level (Bathing) dependent (less than 25% patient effort)   Lower Body Dressing Assessment/Training   [place] Level (Lower Body Dressing) dependent (less than 25% patient effort)   Toileting   Comment, (Toileting) A x 2 if pt pivoting to commode at this time for safety   Clinical Impression   Criteria for Skilled Therapeutic Interventions Met (OT)  Yes, treatment indicated   OT Diagnosis Decreased ADL I   OT Problem List-Impairments impacting ADL problems related to;activity tolerance impaired;mobility;pain;post-surgical precautions;cognition   Assessment of Occupational Performance 5 or more Performance Deficits   Identified Performance Deficits dressing, g/h, toileting, bathing, functional transfers, mobility   Planned Therapy Interventions (OT) ADL retraining;bed mobility training;cognition;ROM;transfer training;home program guidelines;progressive activity/exercise;risk factor education   Clinical Decision Making Complexity (OT) problem focused assessment/low complexity   Risk & Benefits of therapy have been explained evaluation/treatment results reviewed;care plan/treatment goals reviewed   Clinical Impression Comments Pt to benefit from skilled OT to address above deficits. See daily flowsheet for details regarding tx provided.   OT Total Evaluation Time   OT Eval, Low Complexity Minutes (19681) 6   OT Goals   Therapy Frequency (OT) 5 times/week   OT Predicted Duration/Target Date for Goal Attainment 03/22/24   OT: Hygiene/Grooming modified independent   OT: Upper Body Dressing Modified independent   OT: Lower Body Dressing Minimal assist   OT: Cognitive Patient/caregiver will verbalize understanding of cognitive assessment results/recommendations as needed for safe discharge planning   OT Discharge Planning   OT Plan OT: monitor cognition/command following, ADLs from chair, progress OOB as able   OT Discharge Recommendation (DC Rec) home with assist;Transitional Care Facility   OT Rationale for DC Rec Pt is currently below baseline with self cares, mobility, and cognition. Pt with new post op precautions. Pt is primarily limited by confusion this date. Currently, would require rehab stay prior to return home but pending progress and increased command following/improved cognition, pt may be able to return home by the time he is medically appropriate. Will  continue to monitor and adjust recommendations as indicated.   OT Brief overview of current status A x 2   Total Session Time   Total Session Time (sum of timed and untimed services) 6

## 2024-03-08 NOTE — PLAN OF CARE
"D/I: Patient admitted to unit 4E Surgical/Neuro ICU s/p Liver Tx on 3/5/24  /57   Pulse 54   Temp 97.9  F (36.6  C) (Bladder)   Resp 16   Ht 1.73 m (5' 8.11\")   Wt 115 kg (253 lb 8.5 oz)   SpO2 98%   BMI 38.42 kg/m    Neuro- Pt becomes agitated with cares, minimally following commands, moves all extremities, very strong  CV-VS stable, afebrile, bradycardic in the 50's  Respiratory- Vented, minimal secretions  GI-TF @ goal of 40 ml/hr, no BM this shift  - Oliguric, urine output increasing slightly throughout the night  Skin-Abdominal incision, TIMOTHY drain to gravity, multiple bruises, small ulcer on penis-no new issues  Gtts-Precedex @ 1.2, Fentanyl @ 150, Insulin @ 6  Pain- CPOT positive for pain, fentanyl gtt increased, oxycodone given via FT  ID- Contact precautions maintained  CRRT- Removing 50 ml/hr, pt tolerating well, filter clotted @ 0535. CRRT restarted @ 0610.  Restraints-Pt continues to need restraints- reaching for ETT, fighting restraints, kicking with legs when awake    See flowsheets for further interventions and assessments.   A: Stable this shift  P: Continue to monitor pt closely. Remove fluid via CRRT as pt tolerates. Continue current plan of care.  Notify MD of significant changes.               "

## 2024-03-08 NOTE — PROGRESS NOTES
[facility]    ICU Progress Note     Primary Team: Transplant Surgery   Reason for Critical Care Admission: Ventilator Management and Hemodynamic Monitoring  Admitting Physician: Ryder Cortez MD  Date of Admission:  3/4/2024    Assessment: Critical Care   Mary Lopez is a 53 year old male admitted on 3/4/2024. He has a history of alcoholic cirrhosis complicated by hepatic encephalopathy and suspected SBP who presented to the hospital for consideration of  donor liver transplant. He was recently admitted for S.bovis bacteremia and was discharged on amoxicillin and levofloxacin. He underwent DDLT on 3/5/2024 and was admitted to the SICU following his surgery for ventilator management and hemodynamic monitoring. Intra-operatively, patient was transfused 7 U of pRBC and 10 units of platelets. He was extubated in the OR and upon admission to the ICU, he was on oxygen mask, off pressors and sedation. However, became unresponsive shortly after with oxygen saturation dropping to 70s and had to be re-intubated the same day. Needed multiple units of blood product transfusion on 3/6 with high amount of bloody TIMOTHY output. Currently stable on ventilator and off pressors.    CHANGES TODAY:  - PST as tolerated  - Seroquel 25 mg PRN added for agitation   - Remove a-line   - Pull rate on CRRT will be increased per nephrology     Plan: Critical Care   Neuro/ pain/ sedation:  # Post-operative pain   # Hepatic Encephalopathy   - Monitor neurological status. Notify provider for any acute changes in exam  - Sedation: Precedex gtt   - Pain: Fentanyl gtt, PRN Oxy and dilaudid  - Agitation: Seroquel 25 mg q4h PRN     Pulmonary:  # Acute hypoxic respiratory failure  # Pleural effusion, bilateral   - Vent Mode: CMV/AC  (Continuous Mandatory Ventilation/ Assist Control)  FiO2 (%): 40 %  Resp Rate (Set): 16 breaths/min  Tidal Volume (Set, mL): 500 mL  PEEP (cm H2O): 5  cmH2O  Resp: 16  - PST as tolerated   - CXR (3/7): Bilateral pleural effusion, no significant changes compared to prior CXR      Cardiovascular:  # Multifactorial shock (likely hemorrhagic)  - Monitor hemodynamic status.  - MAP goal > 65, currently off pressors  - PTA midodrine currently held  - Will remove a-line today     GI/Nutrition:  # S/p  Donor Liver Transplant   # Esophageal 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   # Portal Hypertension   #Severe Protein Calorie Malnutrition due to Acute Illness   - Diet: NPO  - Nutrition consulted, post-pyloric feeding tube was placed on 3/6. TF at goal   - Bowel regimen increased on 3/7, only passing gas, will add suppository in the afternoon if no BMs  - Post-transplant prophylaxis: Tacrolimus, Mycophenolate and steroid taper   - Monitor daily hepatic panel   - TIMOTHY drain management per transplant, currently to gravity and emptied every 2 hours     Renal/ Fluid Balance:   # Hyponatremia, resolved   # Acute Kidney Injury  - Will monitor intake and output, urine output remains low, Cr trending down with CRRT    - ICU electrolyte replacement protocol  - Monitor daily BMP  - PTA torsemide and spironolactone on hold for now     - MAC exchanged for dialysis line and a new central line is placed on 3/7  - Pull rate on CRRT will be increased per nephrology     Endocrine:  # Stress Hyperglycemia   # Type 2 Diabetes Mellitus    - Maintain blood glucose levels < 180   - Insulin gtt   - Steroid taper per transplant team     ID:  # Hx of S.bovis bacteremia   - Monitor fever curve and daily CBC   - Post-transplant prophylaxis       - Vanc/Zosyn x 5 days        - Fluconazole x 7 days - will talk to transplant team regarding any changes        - Valcyte        - Bactrim          Hematology:  # Acute Blood Loss Anemia  # Macrocytic Anemia   # Chronic Thrombocytopenia  - Monitor daily CBC   -  mg held yesterday with concern for bleeding   - Hgb > 8, INR < 1.5, Fibrinogen > 200, PLT > 50,  "transfuse as needed   - Received multiple units of blood products on 3/6, 2 units of platelets this morning     MSK:   - PT and OT consulted. Appreciate recommendations.    Lines/ tubes/ drains: L internal jugular triple lumen CVC x 1, R internal jugular dialysis line, left brachial a-line (will be removed), PIV x 3 (2 L and 1R), intraabdominal TIMOTHY    Prophylaxis:  - DVT Prophylaxis: Pneumatic Compression Devices  - PUD Prophylaxis: PPI while intubated     Code Status: Full Code      Disposition:  - SICU    The patient's care was discussed with the Attending Physician, Dr. Starr .    Clinically Significant Risk Factors           # Hypercalcemia: corrected calcium is >10.1, will monitor as appropriate    # Hypoalbuminemia: Lowest albumin = 2 g/dL at 3/5/2024  4:09 PM, will monitor as appropriate    # Coagulation Defect: INR = 1.32 (Ref range: 0.85 - 1.15) and/or PTT = 27 Seconds (Ref range: 22 - 38 Seconds), will monitor for bleeding  # Thrombocytopenia: Lowest platelets = 48 in last 2 days, will monitor for bleeding  # Acute Kidney Injury, unspecified: based on a >150% or 0.3 mg/dL increase in last creatinine compared to past 90 day average, will monitor renal function  # Hypertension: Noted on problem list        # Obesity: Estimated body mass index is 38.42 kg/m  as calculated from the following:    Height as of this encounter: 1.73 m (5' 8.11\").    Weight as of this encounter: 115 kg (253 lb 8.5 oz)., PRESENT ON ADMISSION  # Severe Malnutrition: based on nutrition assessment, PRESENT ON ADMISSION     # Financial/Environmental Concerns: none            Nirmal Presley MD  [facility]  Securely message with [employer] (more info)  Text page via [facility] Paging/Directory   _____________________________________________________________________    Chief Complaint   EtOH cirrhosis s/p DDLT     History of Present Illness   Mary Lopez is a 53 year old male with a history " of type 2 DM, HTN, HLD, alcoholic cirrhosis complicated by hepatic encephalopathy, hyponatremia, chronic thrombocytopenia, macrocytic anemia and suspected SBP who presented to the hospital for consideration of  donor liver transplant. He was recently admitted for S.bovis bacteremia and was discharged on amoxicillin and levofloxacin. He was reactivate on the transplant list as of 2024, and underwent DDLT on 3/5/2024 as a  donor organ became available.     Review of Systems    The 10 point Review of Systems is negative other than noted in the HPI or here.     Past Medical History    I have reviewed this patient's medical history and updated it with pertinent information if needed.   Past Medical History:   Diagnosis Date    ANGEL (acute kidney injury) (H24)     Alcoholic cirrhosis of liver without ascites (H) 2023    Alcoholic hepatitis with ascites (H28) 10/03/2023    Alcoholic hepatitis without ascites (H28) 2023    Closed fracture of one rib of left side 2023    Concussion without loss of consciousness 2020    Decompensated hepatic cirrhosis (H) 09/15/2023    Diabetes mellitus, type 2 (H) 2023    Essential hypertension 2020    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)     Mild hyperlipidemia 2021    Persistent insomnia 2023    Portal hypertension (H) 2023    Scrotal abscess     Secondary esophageal 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 (H) 2023    Tobacco abuse disorder 2020    Type 2 diabetes mellitus with hyperglycemia (H) 2023     Past Surgical History   I have reviewed this patient's surgical history and updated it with pertinent information if needed.  Past Surgical History:   Procedure Laterality Date    BENCH LIVER  3/5/2024    Procedure: Bench liver;  Surgeon: Ryder Cortez MD;  Location: [facility] OR    CHOLECYSTECTOMY      COLONOSCOPY N/A 2024    Procedure: COLONOSCOPY, WITH POLYPECTOMY;  Surgeon: Carlitos  Jak RITTER MD;  Location: [facility]    CV RIGHT HEART CATH MEASUREMENTS RECORDED N/A 2024    Procedure: Right Heart Catheterization;  Surgeon: Alfred Tafoya MD;  [facility]    TONSILLECTOMY      TRANSPLANT LIVER RECIPIENT  DONOR N/A 3/5/2024    Procedure: Transplant liver recipient  donor, bile duct stent placement;  Surgeon: Ryder Cortez MD;  Location: [facility] OR    VASECTOMY       Social History   I have reviewed this patient's social history and updated it with pertinent information if needed.    Social History     Tobacco Use    Smoking status: Former     Types: Cigarettes     Passive exposure: Never    Smokeless tobacco: Current     Types: Chew     Last attempt to quit: 2004    Tobacco comments:     Chew daily 1/3 of a tin per day   Vaping Use    Vaping Use: Never used   Substance Use Topics    Alcohol use: Not Currently     Alcohol/week: 12.0 standard drinks of alcohol     Types: 12 Standard drinks or equivalent per week     Comment: Sober since 2023    Drug use: Not Currently     Family History   I have reviewed this patient's family history and updated it with pertinent information if needed.  Family History   Problem Relation Age of Onset    Anxiety Disorder Mother     Depression Mother     Bipolar Disorder Mother     Chronic Obstructive Pulmonary Disease Mother     Lung Cancer Mother 81    Morbid Obesity Father     Diabetes Father     Diabetes Type 2  Brother     Substance Abuse Maternal Grandfather     Substance Abuse Paternal Grandfather     Colon Cancer No family hx of     Liver Disease No family hx of      Prior to Admission Medications   Prior to Admission Medications   Prescriptions Last Dose Informant Patient Reported? Taking?   Continuous Blood Gluc Sensor (DEXCOM G7 SENSOR) MISC   No No   Sig: Change every 10 days.   HYDROcodone-acetaminophen (NORCO) 5-325 MG tablet 3/3/2024  Yes Yes   Sig: Take 1 tablet by mouth every 6 hours as needed  for severe pain Patient took 1 tablet last night for pain.   Insulin Lispro (HUMALOG KWIKPEN) 200 UNIT/ML soln 3/4/2024  No Yes   Sig: Inject 8 Units Subcutaneous 4 times daily (with meals and nightly) Give 8 units before each meal. Give additional units for correction with sliding scale (1 unit for each order of 35 blood glucose >140 before meals).   amoxicillin (AMOXIL) 500 MG capsule 3/4/2024  No Yes   Sig: Take 2 capsules (1,000 mg) by mouth every 8 hours for 7 days   blood glucose (NO BRAND SPECIFIED) test strip   No No   Sig: Use to test blood sugar two times daily or as directed. To accompany: Blood Glucose Monitor Brands: per insurance.   blood glucose monitoring (NO BRAND SPECIFIED) meter device kit   No No   Sig: Use to test blood sugar twice times daily or as directed. Preferred blood glucose meter OR supplies to accompany: Blood Glucose Monitor Brands: per insurance.   folic acid (FOLVITE) 1 MG tablet 3/4/2024  No Yes   Sig: Take 1 tablet (1 mg) by mouth daily   insulin degludec (TRESIBA FLEXTOUCH) 100 UNIT/ML pen 3/3/2024 at 2:30 pm  No Yes   Sig: Inject 36 Units Subcutaneous daily Inject 36 units daily.   insulin lispro (HUMALOG KWIKPEN) 100 UNIT/ML (1 unit dial) KWIKPEN 3/4/2024  No Yes   Sig: Inject 1-10 Units Subcutaneous 3 times daily (before meals) for 50 days Correction scale: Give 1 unit insulin for every order of 35 that blood glucose level is >140 three times daily before meals and for every order of 35 that blood glucose is >200 at bedtime   insulin pen needle (BD PEN NEEDLE SHERRIE 2ND GEN) 32G X 4 MM miscellaneous   No No   Sig: USES 5 PER DAY   lactulose (CHRONULAC) 10 GM/15ML solution 3/4/2024  No Yes   Sig: TAKE 30 MLS BY MOUTH 2 TIMES DAILY   Patient taking differently: TAKE 30 MLS BY MOUTH 3 TIMES DAILY   levofloxacin (LEVAQUIN) 500 MG tablet 3/4/2024  No Yes   Sig: Take 1 tablet (500 mg) by mouth daily for 6 days   melatonin 10 MG TABS tablet 3/3/2024  No Yes   Sig: Take 1 tablet (10 mg)  by mouth nightly as needed for sleep   midodrine (PROAMATINE) 10 MG tablet 3/4/2024  No Yes   Sig: Take 1 tablet (10 mg) by mouth every 8 hours for 30 days   multivitamin w/minerals (THERA-VIT-M) tablet 3/4/2024  No Yes   Sig: TAKE 1 TABLET BY MOUTH DAILY   omeprazole (PRILOSEC) 20 MG DR capsule 3/4/2024  Yes Yes   Sig: Take 1 capsule (20 mg) by mouth 2 times daily   potassium chloride ER (KLOR-CON M) 10 MEQ CR tablet 3/4/2024  No Yes   Sig: Take 2 tablets (20 mEq) by mouth 2 times daily   rifaximin (XIFAXAN) 550 MG TABS tablet 3/4/2024  No Yes   Sig: Take 1 tablet (550 mg) by mouth 2 times daily for 180 days   spironolactone (ALDACTONE) 25 MG tablet 3/4/2024  No Yes   Sig: Take 1 tablet (25 mg) by mouth daily   thiamine (B-1) 100 MG tablet 3/4/2024  No Yes   Sig: Take 1 tablet (100 mg) by mouth daily for 30 days   thin (NO BRAND SPECIFIED) lancets   No No   Sig: Use with lanceting device. To accompany: Blood Glucose Monitor Brands: per insurance.   torsemide (DEMADEX) 10 MG tablet 3/4/2024  No Yes   Sig: Take 3 tablets (30 mg) by mouth daily as needed Take as needed for fluid once daily if systolic blood pressure (top number) is 100 or greater.   traZODone (DESYREL) 50 MG tablet 3/3/2024  No Yes   Sig: Take 0.5 tablets (25 mg) by mouth nightly as needed for sleep      Facility-Administered Medications: None     Allergies   Allergies   Allergen Reactions    Other Food Allergy Anaphylaxis     Legumes (black beans, baked beans, chickpeas)    Pineapple Itching     Physical Exam   Vital Signs: Temp: 97.9  F (36.6  C) Temp src: Bladder   Pulse: 65   Resp: 16 SpO2: 97 % O2 Device: Mechanical Ventilator    Weight: 253 lbs 8.46 oz    General: Intubated and sedated  HEENT: ETT and OGT in place   Neuro: Intermittently following commands on sedation   CV: RRR   Pulm: Coarse breath sounds bilaterally, more on the left   Abd: Soft, non-distended   : Moore in place   Extremities: Warm and well perfused, 3+ pitting edema on BLE    Incisions: Covered with dressing, clean and dry       Data   I reviewed all medications, new labs and imaging results over the last 24 hours.    Arterial Blood Gases   Recent Labs   Lab 03/07/24 0348 03/06/24  1808 03/06/24  1235 03/06/24  0501   PH 7.45 7.46* 7.45 7.48*   PCO2 37 37 37 35   PO2 103 76* 136* 66*   HCO3 25 26 26 26     Complete Blood Count   Recent Labs   Lab 03/08/24 0358 03/07/24  1429 03/07/24  0812 03/07/24  0348   WBC 9.5 8.3 8.8 9.0   HGB 7.9* 7.9* 7.9* 8.2*   PLT 55* 65* 48* 55*     Basic Metabolic Panel  Recent Labs   Lab 03/08/24  0800 03/08/24  0617 03/08/24 0358 03/07/24 2218 03/07/24 2036 03/07/24  1614 03/07/24  1525 03/07/24  0352 03/07/24  0348   NA  --   --  139  --  138  --  138  --  138   POTASSIUM  --   --  4.1  --  4.3  --  4.2  --  4.2   CHLORIDE  --   --  105  --  104  --  103  --  103   CO2  --   --  25  --  22  --  24  --  24   BUN  --   --  36.8*  --  39.4*  --  45.3*  --  37.6*   CR  --   --  1.74*  --  1.98*  --  2.35*  --  2.06*   * 111* 130*  134*   < > 159*   < > 178*   < > 139*    < > = values in this interval not displayed.     Liver Function Tests  Recent Labs   Lab 03/08/24 0358 03/07/24 2036 03/07/24 0348 03/06/24 2211 03/06/24  1603 03/06/24  1233 03/06/24  0816 03/06/24  0500   AST 59*  --  109*  --   --  156*  --  310*   *  --  152*  --   --  183*  --  294*   ALKPHOS 75  --  56  --   --  40  --  47   BILITOTAL 1.3*  --  1.5*  --   --  1.6*  --  2.6*   ALBUMIN 3.1* 3.1* 2.8*  --   --  2.4*  --  2.3*   INR 1.32*  --  1.28* 1.29* 1.41* 1.59*   < > 1.60*    < > = values in this interval not displayed.     Pancreatic Enzymes  Recent Labs   Lab 03/06/24  0500 03/04/24  1058   LIPASE 5*  --    AMYLASE 22* 10*     Coagulation Profile  Recent Labs   Lab 03/08/24  0358 03/07/24  0348 03/06/24  2211 03/06/24  1603 03/06/24  1233 03/06/24  0816 03/05/24  2338 03/05/24  1609 03/05/24  1519   INR 1.32* 1.28* 1.29* 1.41*   < > 1.57*   < > 2.12* 2.12*    PTT  --   --   --  27  --  30  --  87* 89*    < > = values in this interval not displayed.     IMAGING:  Recent Results (from the past 24 hour(s))   XR Chest Port 1 View    Narrative    Exam: XR CHEST PORT 1 VIEW, 3/7/2024 3:00 PM    Indication: Confirm central line and dialysis line placement    Comparison: Earlier same day x-ray    Findings:   Right IJ catheter tip at the right shoulder the left low right  internal jugular vein. Left IJ catheter tip at the innominate  confluence. Enteric tubes course past the diaphragm with tip excluded  from field-of-view. ET tube tip projects over the midthoracic trachea.      Cardiomegaly. Low lung volumes with blunting of the costophrenic  lungs, unchanged. Retrocardiac and bibasilar atelectasis, also  unchanged. Partially seen right upper quadrant surgical drain.      Impression    Impression:   1. New left IJ catheter tip at the innominate confluence. Unchanged  right IJ catheter tip in the low right internal jugular vein.  2. Unchanged low lung volumes with trace effusions and  perihilar/retrocardiac atelectasis.    I have personally reviewed the examination and initial interpretation  and I agree with the findings.    SOFÍA DUBON MD         SYSTEM ID:  [id number]

## 2024-03-08 NOTE — PROGRESS NOTES
The patient was extubated without any compilations. The pt was suctioned orally and down the ETT tube. Cuff leak was present. The pt was put on 4L NC after extubation and SpO2 saturation was 100%. Bilateral breath sounds were clear and no stridor was present.     RT Amelie on 3/8/2024 at 11:32 AM

## 2024-03-08 NOTE — PHARMACY-VANCOMYCIN DOSING SERVICE
"Pharmacy Vancomycin Note  Date of Service 2024  Patient's  1970   53 year old, male    Indication: Bacteremia and Surgical Prophylaxis  Day of Therapy: 4  Current vancomycin regimen:  intermittent dosing of vancomycin   Current vancomycin monitoring method: Renal Replacement Therapy  Current vancomycin therapeutic monitoring goal: 10-15 mg/L    Current estimated CrCl = Estimated Creatinine Clearance: 61.3 mL/min (A) (based on SCr of 1.74 mg/dL (H)).    Creatinine for last 3 days  3/5/2024:  4:09 PM Creatinine 0.89 mg/dL  3/6/2024:  5:00 AM Creatinine 1.11 mg/dL; 12:33 PM Creatinine 1.45 mg/dL;  2:25 PM Creatinine 1.44 mg/dL;  6:08 PM Creatinine 1.65 mg/dL; 10:11 PM Creatinine 1.81 mg/dL  3/7/2024:  3:48 AM Creatinine 2.06 mg/dL;  3:25 PM Creatinine 2.35 mg/dL;  8:36 PM Creatinine 1.98 mg/dL  3/8/2024:  3:58 AM Creatinine 1.74 mg/dL    Recent Vancomycin Levels (past 3 days)  3/6/2024: 10:51 AM Vancomycin 23.0 ug/mL  3/7/2024:  3:48 AM Vancomycin 19.5 ug/mL  3/8/2024:  3:58 AM Vancomycin 11.4 ug/mL    Vancomycin IV Administrations (past 72 hours)                     vancomycin (VANCOCIN) 1,250 mg in 0.9% NaCl 250 mL intermittent infusion (mg) 1,250 mg New Bag 24 2347     1,250 mg Given  1054                    Nephrotoxins and other renal medications (From now, onward)      Start     Dose/Rate Route Frequency Ordered Stop    24 0800  vancomycin (VANCOCIN) 1,250 mg in 0.9% NaCl 250 mL intermittent infusion         1,250 mg  over 90 Minutes Intravenous EVERY 24 HOURS 24 0654 24 0759    24 1930  piperacillin-tazobactam (ZOSYN) 3.375 g vial to attach to  mL bag        Note to Pharmacy: For [facility], [facility] and [facility]: For Zosyn-naive patients, use the \"Zosyn initial dose + extended infusion\" order panel.    3.375 g  over 30 Minutes Intravenous EVERY 6 HOURS 24 1501 03/10/24 1959    24 1800  tacrolimus (GENERIC) capsule 2 mg        See [facility] for full Linked Orders " Report.    2 mg Oral 2 TIMES DAILY. 03/05/24 1529      03/05/24 1800  tacrolimus (GENERIC) suspension 2 mg        [facility] for [facility].    2 mg Oral or NG Tube 2 TIMES DAILY. 03/05/24 1529                 Contrast Orders - past 72 hours (72h ago, onward)      None          Interpretation of levels and current regimen:  Vancomycin level is reflective of post-CRRT initiation level and is within goal range. Will initiate q24h dosing while on CRRT.     Has serum creatinine changed greater than 50% in last 72 hours: N/A    Urine output:  diminished urine output    Renal Function: Worsened from baseline, now on CRRT      Plan:  Change from intermittent dosing to 1250mg IV vancomycin q24h.   Vancomycin monitoring method: Renal Replacement Therapy  Vancomycin therapeutic monitoring goal: 10-15 mg/L  Pharmacy will check vancomycin levels as appropriate in  2-3 days (planned duration of treatment if through 3/10/24 but could consider level recheck if therapy is extended) .  Serum creatinine levels will be ordered a minimum of twice weekly.    Celsa Echevarria, PharmD, BCCCP

## 2024-03-08 NOTE — PROGRESS NOTES
Immunosuppression Management Note:    Mary Lopez is a 53 year old male who is seen today  for immunosuppression management     I, Ryder Cortez MD, I have examined the patient with our JOSELYN/Fellow as part of a shared visit.   I participated in the rounds,  discussed and agree with the note and findings and  reviewed today's vital signs, medications, labs and imaging as noted in this note.  I  reviewed the  immunosuppression medications.  I personally provided a substantive portion of the care of this patient. I personally performed the immunosuppressive management of this patient, reviewed the overall  immunosuppression including drug levels, allograft function and provided the recommendations to adjust the dose to provide optimal levels to prevent rejection of the allograft and prevent toxicity to the organs. This was complex care due to the fresh allograft.   Time spent: evaluating patient, examining patient, discussion of plan, counseling and documentation: >35 min   I spoke to the patient/family and explained below clinical details and answered all the questions    Transplant Surgery  Inpatient Daily Progress Note  2024    Assessment & Plan: Mary Lopez is a 53 year old male with a history of EtOH cirrhosis, complicated by encephalopathy, hyponatremia, chronic thrombocytopenia, macrocytic anemia, with hospitalization (2024) for encephalopathy and suspected SBP, and recent admission -3/2 for Strep bovis bacteremia. He is now s/p a  donor liver transplant on 3/5/24 with biliary stent placement with Dr. Cortez. Pre-op MELD 31.    Graft function: Good, LFTs downtrending, TB 1.3 (1.5).  Jose Roberto BID. Start ASA 81mg daily  Plan for HIDA 3/11 due to bile duct size mismatch (donor 5 mm, recipient 10 mm)   -Liver US: Normal liver, patent doppler. Hematoma inferior to the right lobe   Immunosuppression management: Induction with Solu-Medrol/pred taper  Maintenance:  MMF:  750 mg BID  Tac 2 mg BID, goal 6-8 renal spairing  Pred 5 while tac <8  Neurology:   Acute postoperative pain:   Fentanyl gtt while intubated, Precedex  Hematology: Acute blood loss anemia. Received 10 units pRBCs post OR, last transfused 3/6.  Hgb stable 8  Thrombocytopenia: due to liver disease, Plts 50s. Trend.  Coagulopathy: INR 1.32, fibrinogen >200  Cardiorespiratory: Acute respiratory failure: Extubated in the OR, re-intubated shortly after transfer to the ICU  Bilateral pleural effusions: L pleural effusion stable, R pleural effusion slightly improved  Circulatory failure requiring pressor support: Resolved, off pressors  GI/Nutrition: NPO due to critical status, started on tube feeds  Continue bowel regimen  Endocrine: Type 2 diabetes mellitus with steroid hyperglycemia: on insulin drip   Fluid/Electrolytes:   Electrolytes per ICU  Renal: ANGEL, oliguric. Nephrology consulted, CRRT initiated 3/7 1L volume removal, Hypervolemia: Diuresed with lasix per ICU.  Bumex today per neph. Continue CRRT with volume removal   : Moore to remain due to critical status  Infectious disease: Continue vanc/zosyn  H/o S bovis bacteremia:   Prophylaxis: DVT, fall, GI(PPI), fungal (diflucan-change to Micafungin d/t CRRT )  Zosyn, Vanco  Disposition: SICU, PT/OT    Medical Decision Making: High  Subsequent visit 38302 (high level decision making)    JOSELYN/Fellow/Resident Provider: Isatu Perla NP      Faculty: Ryder Cortez M.D.      Medical Decision Making: High  Subsequent visit 56576 (high level decision making)    __________________________________________________________________  Transplant History: Admitted 3/4/2024 for  donor liver transplant.   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    3/5/2024 (Liver), Postoperative day: 3     Interval History: History is obtained from the electronic health record and patient's spouse  Overnight events: Intubated. Oliguric. Bleeding appearing  "to stabilize with decreased need for transfusion.    ROS:   A 10-point review of systems was negative except as noted above.    Curent Meds:   [Held by provider] aspirin  325 mg Oral or Feeding Tube Daily    chlorhexidine  15 mL Mouth/Throat Q12H    fluconazole  400 mg Intravenous Q24H    methylPREDNISolone  50 mg Intravenous Once    Followed by    [START ON 3/9/2024] predniSONE  25 mg Oral Once    Followed by    [START ON 3/10/2024] predniSONE  10 mg Oral Once    multivitamins w/minerals  15 mL Per Feeding Tube Daily    mycophenolate  750 mg Oral BID IS    Or    mycophenolate  750 mg Oral or NG Tube BID IS    [START ON 3/13/2024] nystatin  10 mL Swish & Swallow 4x Daily    pantoprazole  40 mg Per Feeding Tube QAM AC    piperacillin-tazobactam  3.375 g Intravenous Q6H    polyethylene glycol  17 g Oral or Feeding Tube BID    protein modular  1 packet Per Feeding Tube BID    senna-docusate  1-2 tablet Oral or Feeding Tube BID    sodium chloride (PF)  3 mL Intravenous Q8H    sulfamethoxazole-trimethoprim  1 tablet Oral or Feeding Tube QPM    tacrolimus  2 mg Oral BID IS    Or    tacrolimus  2 mg Oral or NG Tube BID IS    thiamine  100 mg Oral or Feeding Tube Daily    ursodiol  300 mg Oral BID    Or    ursodiol  300 mg Oral or NG Tube BID    valGANciclovir  450 mg Oral QPM    Or    valGANciclovir  450 mg Oral or NG Tube QPM    vancomycin place fierro - receiving intermittent dosing  1 each Intravenous See Admin Instructions       Physical Exam:     Admit Weight: 107.2 kg (236 lb 4.8 oz)    Current Vitals:   /57   Pulse 69   Temp 97.7  F (36.5  C)   Resp 20   Ht 1.73 m (5' 8.11\")   Wt 115 kg (253 lb 8.5 oz)   SpO2 97%   BMI 38.42 kg/m      CVP (mmHg): 27 mmHg    Vital sign ranges:    Temp:  [97.7  F (36.5  C)-99.3  F (37.4  C)] 97.7  F (36.5  C)  Pulse:  [50-73] 69  Resp:  [16-20] 20  BP: (111)/(57) 111/57  MAP:  [72 mmHg-103 mmHg] 103 mmHg  Arterial Line BP: (108-141)/(54-72) 141/72  FiO2 (%):  [40 %] 40 " %  SpO2:  [94 %-100 %] 97 %  Patient Vitals for the past 24 hrs:   BP Temp Temp src Pulse Resp SpO2 Weight   03/08/24 0600 -- 97.7  F (36.5  C) -- 69 20 97 % --   03/08/24 0500 -- 97.9  F (36.6  C) -- 54 16 99 % --   03/08/24 0400 -- 97.9  F (36.6  C) Bladder 54 16 98 % 115 kg (253 lb 8.5 oz)   03/08/24 0300 -- 97.9  F (36.6  C) -- 57 16 97 % --   03/08/24 0200 -- 97.9  F (36.6  C) -- 59 16 97 % --   03/08/24 0100 -- 97.9  F (36.6  C) -- 52 16 98 % --   03/08/24 0000 -- 97.9  F (36.6  C) Bladder 50 16 98 % --   03/07/24 2300 -- 98.2  F (36.8  C) -- 54 16 97 % --   03/07/24 2200 -- -- -- 64 16 97 % --   03/07/24 2100 -- 98.6  F (37  C) -- 73 16 97 % --   03/07/24 2000 -- 98.8  F (37.1  C) Bladder 63 16 98 % --   03/07/24 1900 -- 98.8  F (37.1  C) -- 58 -- 97 % --   03/07/24 1800 -- 99  F (37.2  C) -- 60 -- 98 % --   03/07/24 1700 -- 99.1  F (37.3  C) -- 64 -- 98 % --   03/07/24 1600 -- 99.1  F (37.3  C) Bladder 65 16 98 % --   03/07/24 1500 -- 99.3  F (37.4  C) -- 68 -- 97 % --   03/07/24 1400 -- 99.3  F (37.4  C) -- 66 -- 96 % --   03/07/24 1300 -- 99.1  F (37.3  C) -- 68 -- 97 % --   03/07/24 1200 -- 99.1  F (37.3  C) Bladder 73 16 97 % --   03/07/24 1100 -- 99.1  F (37.3  C) -- 68 -- 95 % --   03/07/24 1030 -- 99  F (37.2  C) Bladder 69 16 95 % --   03/07/24 1019 -- 99  F (37.2  C) Bladder 69 16 96 % --   03/07/24 1015 -- 99  F (37.2  C) -- 69 16 100 % --   03/07/24 1000 -- 99  F (37.2  C) -- 68 -- 96 % --   03/07/24 0900 -- 98.8  F (37.1  C) -- 65 -- 96 % --   03/07/24 0845 -- 98.8  F (37.1  C) -- 66 -- 95 % --   03/07/24 0836 111/57 98.8  F (37.1  C) -- 65 16 95 % --   03/07/24 0800 -- 98.6  F (37  C) Bladder 66 16 94 % --   03/07/24 0700 -- -- Bladder 62 16 94 % --     General Appearance: Intubated, sedated  Skin: normal, warm  Heart: regular rate and rhythm  Lungs: Intubated  Abdomen: Incision covered, shadowing, drain with serosanguinous output. Abd soft. No guarding  : Moore catheter in place clear  lida  Extremities: edema: bilaterally, +2 pitting  Neurologic: Agitated, BRAYAN independently, opens eyes to name    Frailty Scores          12/19/2023   Frailty Scores   Final Score Frail   Final Score Number 4       Data:   CMP  Recent Labs   Lab 03/08/24  0617 03/08/24 0358 03/07/24 2218 03/07/24 2036 03/07/24 0352 03/07/24  0348 03/06/24  0503 03/06/24  0500 03/04/24  1257 03/04/24  1058   NA  --  139  --  138   < > 138   < > 136   < > 129*   POTASSIUM  --  4.1  --  4.3   < > 4.2   < > 4.0   < > 4.3   CHLORIDE  --  105  --  104   < > 103   < > 104   < > 96*   CO2  --  25  --  22   < > 24   < > 25   < > 25   * 130*  134*   < > 159*   < > 139*   < > 154*   < > 392*   BUN  --  36.8*  --  39.4*   < > 37.6*   < > 23.6*   < > 17.5   CR  --  1.74*  --  1.98*   < > 2.06*   < > 1.11   < > 1.08   GFRESTIMATED  --  47*  --  40*   < > 38*   < > 80   < > 83   MEG  --  8.1*  --  8.4*   < > 8.8   < > 8.8   < > 8.7   ICAW  --  4.5  4.6  --  4.7  --  5.0   < > 5.2   < >  --    MAG  --  2.4*  --  2.4*   < > 2.2   < > 1.6*  --  1.1*   PHOS  --  4.9*  --  5.4*   < > 5.7*   < > 3.8  --  2.3*   AMYLASE  --   --   --   --   --   --   --  22*  --  10*   LIPASE  --   --   --   --   --   --   --  5*  --   --    ALBUMIN  --  3.1*  --  3.1*  --  2.8*   < > 2.3*   < > 2.6*   BILITOTAL  --  1.3*  --   --   --  1.5*   < > 2.6*   < > 12.7*   ALKPHOS  --  75  --   --   --  56   < > 47   < > 181*   AST  --  59*  --   --   --  109*   < > 310*   < >  --    ALT  --  124*  --   --   --  152*   < > 294*   < > 50    < > = values in this interval not displayed.     CBC  Recent Labs   Lab 03/08/24  0358 03/07/24  1429   HGB 7.9* 7.9*   WBC 9.5 8.3   PLT 55* 65*     COAGS  Recent Labs   Lab 03/08/24  0358 03/07/24  0348 03/06/24  2211 03/06/24  1603 03/06/24  1233 03/06/24  0816   INR 1.32* 1.28*   < > 1.41*   < > 1.57*   PTT  --   --   --  27  --  30    < > = values in this interval not displayed.      Urinalysis  Recent Labs   Lab Test  03/04/24  1042 02/23/24  1558   COLOR Yellow Yellow   APPEARANCE Clear Clear   URINEGLC 500* Negative   URINEBILI Negative Small*   URINEKETONE Negative Negative   SG 1.007 1.009   UBLD Negative Negative   URINEPH 5.0 5.0   PROTEIN Negative Negative   NITRITE Negative Negative   LEUKEST Negative Negative   RBCU <1 <1   WBCU <1 <1     Virology:  Hepatitis C Antibody   Date Value Ref Range Status   03/04/2024 Nonreactive Nonreactive Final     Comment:     A nonreactive screening test result does not exclude the possibility of exposure to or infection with HCV. Nonreactive screening test results in individuals with prior exposure to HCV may be due to antibody levels below the limit of detection of this assay or lack of reactivity to the HCV antigens used in this assay. Patients with recent HCV infections (<3 months from time of exposure) may have false-negative HCV antibody results due to the time needed for seroconversion (average of 8 to 9 weeks).

## 2024-03-08 NOTE — PROCEDURES
[facility] Procedure Note:  DATE: March 8, 2024  PREOPERATIVE DIAGNOSIS: S/p DDLT  POSTOPERATIVE DIAGNOSIS: As above  PROCEDURE: Right sided MAC line removal and placement of right sided Fidencio dialysis line rewire placement  RESIDENT: Ant Gastelum MD   FELLOW: Dr. Parth Celaya  ATTENDING: Dr. Irineo Starr  COMPLICATIONS: none   SPECIMEN:  none  COMORBIDITIES:     INDICATIONS FOR PROCEDURE: Initiation of CRRT    OPERATIVE PROCEDURE:     Indication and risks/benefits of the procedure were discussed with the patient/patient decision maker and informed consent was obtained.      The patient was located in the ICU. A time-out was completed verifying correct patient, procedure, site, and positioning. The patient was supine and placed in a position appropriate for rewiring and removal of MAC line and placement of Fidencio dialysis line. The patient s right neck and chest was prepped and draped in sterile fashion. Patient previously on analgesia and sedated with Precedex and fentanyl. Under direct visualization with ultrasound guidance, the right internal jugular vein was previously assessed and MAC line was visualized. The area surrounding the MAC line insertion was anesthestized with 3 ml of lidocaine 1% without epinephrine, followed by removal of previously place suture stitches. A wire was then inserted through one of the MAC line ports and this was then removed. Dark venous bleeding was encountered. This was followed by a double lumen, dialysis line catheter placement. The catheter was threaded smoothly over the guide wire and appropriate blood return was obtained. The lumen of the catheter was evacuated of air and flushed with sterile saline. The catheter was then sutured in place to the skin and a sterile dressing applied.  All ports flushed easily.     EBL 5 ml    CXR ordered to confirm appropriate placement.     __________________________________    Ant Gastelum MD  PGY2  Dept. of Surgery      See [facility] for on-call pager information.

## 2024-03-08 NOTE — PROGRESS NOTES
CRRT STATUS NOTE     DATA:  Time:  6:00 AM  Pressures WNL:  YES  Filter Status:  WDL     Problems Reported/Alarms Noted:  None.     Supplies Present:  YES     ASSESSMENT:  Patient Net Fluid Balance:  Net -40 ml @ midnight, -460 ml @ 0600.  Vital Signs:  HR 54 SB, -120/60, MAP 80  Labs:  K 4.1, Mg 2.4, Phos 4.9, iCa 4.5, Hgb 7.9, Plt 55  Goals of Therapy:  50 ml/hr     INTERVENTIONS:   None.     PLAN:  Continue to monitor circuit daily and change set q72 hours or PRN for clotting/clogging. Please call CRRT RN with any questions/problems.

## 2024-03-09 ENCOUNTER — APPOINTMENT (OUTPATIENT)
Dept: OCCUPATIONAL THERAPY | Facility: CLINIC | Age: 54
DRG: 005 | End: 2024-03-09
Attending: TRANSPLANT SURGERY
Payer: COMMERCIAL

## 2024-03-09 LAB
ABO/RH(D): NORMAL
ALBUMIN SERPL BCG-MCNC: 3.1 G/DL (ref 3.5–5.2)
ALP SERPL-CCNC: 79 U/L (ref 40–150)
ALT SERPL W P-5'-P-CCNC: 103 U/L (ref 0–70)
AMMONIA PLAS-SCNC: 27 UMOL/L (ref 16–60)
ANION GAP SERPL CALCULATED.3IONS-SCNC: 7 MMOL/L (ref 7–15)
ANION GAP SERPL CALCULATED.3IONS-SCNC: 8 MMOL/L (ref 7–15)
ANTIBODY SCREEN: NEGATIVE
AST SERPL W P-5'-P-CCNC: 43 U/L (ref 0–45)
BACTERIA BLD CULT: NO GROWTH
BACTERIA BLD CULT: NO GROWTH
BASOPHILS # BLD AUTO: 0 10E3/UL (ref 0–0.2)
BASOPHILS NFR BLD AUTO: 0 %
BILIRUB DIRECT SERPL-MCNC: 1.06 MG/DL (ref 0–0.3)
BILIRUB SERPL-MCNC: 1.7 MG/DL
BUN SERPL-MCNC: 29.4 MG/DL (ref 6–20)
BUN SERPL-MCNC: 30 MG/DL (ref 6–20)
CA-I BLD-MCNC: 4.6 MG/DL (ref 4.4–5.2)
CALCIUM SERPL-MCNC: 7.8 MG/DL (ref 8.6–10)
CALCIUM SERPL-MCNC: 7.9 MG/DL (ref 8.6–10)
CHLORIDE SERPL-SCNC: 104 MMOL/L (ref 98–107)
CHLORIDE SERPL-SCNC: 107 MMOL/L (ref 98–107)
CREAT SERPL-MCNC: 1.06 MG/DL (ref 0.67–1.17)
CREAT SERPL-MCNC: 1.06 MG/DL (ref 0.67–1.17)
DEPRECATED HCO3 PLAS-SCNC: 25 MMOL/L (ref 22–29)
DEPRECATED HCO3 PLAS-SCNC: 25 MMOL/L (ref 22–29)
EGFRCR SERPLBLD CKD-EPI 2021: 84 ML/MIN/1.73M2
EGFRCR SERPLBLD CKD-EPI 2021: 84 ML/MIN/1.73M2
EOSINOPHIL # BLD AUTO: 0.1 10E3/UL (ref 0–0.7)
EOSINOPHIL NFR BLD AUTO: 0 %
ERYTHROCYTE [DISTWIDTH] IN BLOOD BY AUTOMATED COUNT: 18.7 % (ref 10–15)
FIBRINOGEN PPP-MCNC: 175 MG/DL (ref 170–490)
GLUCOSE BLDC GLUCOMTR-MCNC: 116 MG/DL (ref 70–99)
GLUCOSE BLDC GLUCOMTR-MCNC: 129 MG/DL (ref 70–99)
GLUCOSE BLDC GLUCOMTR-MCNC: 133 MG/DL (ref 70–99)
GLUCOSE BLDC GLUCOMTR-MCNC: 133 MG/DL (ref 70–99)
GLUCOSE BLDC GLUCOMTR-MCNC: 136 MG/DL (ref 70–99)
GLUCOSE BLDC GLUCOMTR-MCNC: 143 MG/DL (ref 70–99)
GLUCOSE BLDC GLUCOMTR-MCNC: 143 MG/DL (ref 70–99)
GLUCOSE BLDC GLUCOMTR-MCNC: 148 MG/DL (ref 70–99)
GLUCOSE BLDC GLUCOMTR-MCNC: 149 MG/DL (ref 70–99)
GLUCOSE BLDC GLUCOMTR-MCNC: 160 MG/DL (ref 70–99)
GLUCOSE BLDC GLUCOMTR-MCNC: 166 MG/DL (ref 70–99)
GLUCOSE BLDC GLUCOMTR-MCNC: 167 MG/DL (ref 70–99)
GLUCOSE BLDC GLUCOMTR-MCNC: 174 MG/DL (ref 70–99)
GLUCOSE BLDC GLUCOMTR-MCNC: 180 MG/DL (ref 70–99)
GLUCOSE BLDC GLUCOMTR-MCNC: 193 MG/DL (ref 70–99)
GLUCOSE BLDC GLUCOMTR-MCNC: 206 MG/DL (ref 70–99)
GLUCOSE BLDC GLUCOMTR-MCNC: 98 MG/DL (ref 70–99)
GLUCOSE SERPL-MCNC: 188 MG/DL (ref 70–99)
GLUCOSE SERPL-MCNC: 222 MG/DL (ref 70–99)
HCT VFR BLD AUTO: 27.6 % (ref 40–53)
HGB BLD-MCNC: 9 G/DL (ref 13.3–17.7)
IMM GRANULOCYTES # BLD: 0.1 10E3/UL
IMM GRANULOCYTES NFR BLD: 1 %
INR PPP: 1.25 (ref 0.85–1.15)
LYMPHOCYTES # BLD AUTO: 0.6 10E3/UL (ref 0.8–5.3)
LYMPHOCYTES NFR BLD AUTO: 4 %
MAGNESIUM SERPL-MCNC: 2.2 MG/DL (ref 1.7–2.3)
MCH RBC QN AUTO: 30.7 PG (ref 26.5–33)
MCHC RBC AUTO-ENTMCNC: 32.6 G/DL (ref 31.5–36.5)
MCV RBC AUTO: 94 FL (ref 78–100)
MONOCYTES # BLD AUTO: 1.4 10E3/UL (ref 0–1.3)
MONOCYTES NFR BLD AUTO: 9 %
NEUTROPHILS # BLD AUTO: 13 10E3/UL (ref 1.6–8.3)
NEUTROPHILS NFR BLD AUTO: 86 %
NRBC # BLD AUTO: 0 10E3/UL
NRBC BLD AUTO-RTO: 0 /100
PHOSPHATE SERPL-MCNC: 4.4 MG/DL (ref 2.5–4.5)
PLATELET # BLD AUTO: 106 10E3/UL (ref 150–450)
POTASSIUM SERPL-SCNC: 3.6 MMOL/L (ref 3.4–5.3)
POTASSIUM SERPL-SCNC: 4.1 MMOL/L (ref 3.4–5.3)
PROT SERPL-MCNC: 5 G/DL (ref 6.4–8.3)
RBC # BLD AUTO: 2.93 10E6/UL (ref 4.4–5.9)
SODIUM SERPL-SCNC: 137 MMOL/L (ref 135–145)
SODIUM SERPL-SCNC: 139 MMOL/L (ref 135–145)
SPECIMEN EXPIRATION DATE: NORMAL
TACROLIMUS BLD-MCNC: 2.2 UG/L (ref 5–15)
TME LAST DOSE: ABNORMAL H
TME LAST DOSE: ABNORMAL H
WBC # BLD AUTO: 15.1 10E3/UL (ref 4–11)

## 2024-03-09 PROCEDURE — 82330 ASSAY OF CALCIUM: CPT | Performed by: TRANSPLANT SURGERY

## 2024-03-09 PROCEDURE — 82140 ASSAY OF AMMONIA: CPT

## 2024-03-09 PROCEDURE — 85610 PROTHROMBIN TIME: CPT | Performed by: TRANSPLANT SURGERY

## 2024-03-09 PROCEDURE — 120N000011 HC R&B TRANSPLANT UMMC

## 2024-03-09 PROCEDURE — 250N000009 HC RX 250

## 2024-03-09 PROCEDURE — 250N000012 HC RX MED GY IP 250 OP 636 PS 637: Performed by: NURSE PRACTITIONER

## 2024-03-09 PROCEDURE — 250N000013 HC RX MED GY IP 250 OP 250 PS 637: Performed by: STUDENT IN AN ORGANIZED HEALTH CARE EDUCATION/TRAINING PROGRAM

## 2024-03-09 PROCEDURE — 90947 DIALYSIS REPEATED EVAL: CPT

## 2024-03-09 PROCEDURE — 97535 SELF CARE MNGMENT TRAINING: CPT | Mod: GO

## 2024-03-09 PROCEDURE — 250N000011 HC RX IP 250 OP 636

## 2024-03-09 PROCEDURE — 82248 BILIRUBIN DIRECT: CPT

## 2024-03-09 PROCEDURE — 250N000013 HC RX MED GY IP 250 OP 250 PS 637: Performed by: NURSE PRACTITIONER

## 2024-03-09 PROCEDURE — 99233 SBSQ HOSP IP/OBS HIGH 50: CPT | Mod: 24 | Performed by: INTERNAL MEDICINE

## 2024-03-09 PROCEDURE — 99232 SBSQ HOSP IP/OBS MODERATE 35: CPT | Mod: 24 | Performed by: NURSE PRACTITIONER

## 2024-03-09 PROCEDURE — 258N000003 HC RX IP 258 OP 636: Performed by: NURSE PRACTITIONER

## 2024-03-09 PROCEDURE — 99232 SBSQ HOSP IP/OBS MODERATE 35: CPT | Mod: 24 | Performed by: SURGERY

## 2024-03-09 PROCEDURE — 250N000013 HC RX MED GY IP 250 OP 250 PS 637: Performed by: TRANSPLANT SURGERY

## 2024-03-09 PROCEDURE — 83735 ASSAY OF MAGNESIUM: CPT

## 2024-03-09 PROCEDURE — 80197 ASSAY OF TACROLIMUS: CPT | Performed by: NURSE PRACTITIONER

## 2024-03-09 PROCEDURE — 85025 COMPLETE CBC W/AUTO DIFF WBC: CPT | Performed by: TRANSPLANT SURGERY

## 2024-03-09 PROCEDURE — 250N000013 HC RX MED GY IP 250 OP 250 PS 637: Performed by: PHYSICIAN ASSISTANT

## 2024-03-09 PROCEDURE — 250N000011 HC RX IP 250 OP 636: Performed by: TRANSPLANT SURGERY

## 2024-03-09 PROCEDURE — 250N000012 HC RX MED GY IP 250 OP 636 PS 637: Performed by: STUDENT IN AN ORGANIZED HEALTH CARE EDUCATION/TRAINING PROGRAM

## 2024-03-09 PROCEDURE — 250N000009 HC RX 250: Performed by: PHYSICIAN ASSISTANT

## 2024-03-09 PROCEDURE — 250N000011 HC RX IP 250 OP 636: Mod: JZ | Performed by: NURSE PRACTITIONER

## 2024-03-09 PROCEDURE — 84100 ASSAY OF PHOSPHORUS: CPT

## 2024-03-09 PROCEDURE — 250N000013 HC RX MED GY IP 250 OP 250 PS 637

## 2024-03-09 PROCEDURE — 86900 BLOOD TYPING SEROLOGIC ABO: CPT | Performed by: TRANSPLANT SURGERY

## 2024-03-09 PROCEDURE — 250N000011 HC RX IP 250 OP 636: Performed by: NURSE PRACTITIONER

## 2024-03-09 PROCEDURE — 85384 FIBRINOGEN ACTIVITY: CPT

## 2024-03-09 PROCEDURE — 250N000009 HC RX 250: Performed by: STUDENT IN AN ORGANIZED HEALTH CARE EDUCATION/TRAINING PROGRAM

## 2024-03-09 PROCEDURE — 258N000003 HC RX IP 258 OP 636: Performed by: TRANSPLANT SURGERY

## 2024-03-09 RX ORDER — BUMETANIDE 0.25 MG/ML
2 INJECTION INTRAMUSCULAR; INTRAVENOUS EVERY 8 HOURS
Status: DISCONTINUED | OUTPATIENT
Start: 2024-03-09 | End: 2024-03-10

## 2024-03-09 RX ORDER — BUMETANIDE 0.25 MG/ML
2 INJECTION INTRAMUSCULAR; INTRAVENOUS
Status: DISCONTINUED | OUTPATIENT
Start: 2024-03-09 | End: 2024-03-09

## 2024-03-09 RX ORDER — TACROLIMUS 1 MG/1
4 CAPSULE ORAL
Status: DISCONTINUED | OUTPATIENT
Start: 2024-03-09 | End: 2024-03-10

## 2024-03-09 RX ORDER — BISACODYL 10 MG
10 SUPPOSITORY, RECTAL RECTAL DAILY PRN
Status: DISCONTINUED | OUTPATIENT
Start: 2024-03-09 | End: 2024-03-21

## 2024-03-09 RX ORDER — POLYETHYLENE GLYCOL 3350 17 G/17G
17 POWDER, FOR SOLUTION ORAL DAILY
Status: DISCONTINUED | OUTPATIENT
Start: 2024-03-10 | End: 2024-03-13

## 2024-03-09 RX ORDER — TACROLIMUS 1 MG/1
3 CAPSULE ORAL
Status: DISCONTINUED | OUTPATIENT
Start: 2024-03-09 | End: 2024-03-09

## 2024-03-09 RX ORDER — AMOXICILLIN 250 MG
1 CAPSULE ORAL 2 TIMES DAILY
Status: DISCONTINUED | OUTPATIENT
Start: 2024-03-09 | End: 2024-03-13

## 2024-03-09 RX ADMIN — TACROLIMUS 3 MG: 1 CAPSULE ORAL at 07:46

## 2024-03-09 RX ADMIN — CALCIUM CHLORIDE, MAGNESIUM CHLORIDE, SODIUM CHLORIDE, SODIUM BICARBONATE, POTASSIUM CHLORIDE AND SODIUM PHOSPHATE DIBASIC DIHYDRATE 12.5 ML/KG/HR: 3.68; 3.05; 6.34; 3.09; .314; .187 INJECTION INTRAVENOUS at 01:08

## 2024-03-09 RX ADMIN — OXYCODONE HYDROCHLORIDE 10 MG: 10 TABLET ORAL at 06:02

## 2024-03-09 RX ADMIN — PIPERACILLIN AND TAZOBACTAM 3.38 G: 3; .375 INJECTION, POWDER, FOR SOLUTION INTRAVENOUS at 01:45

## 2024-03-09 RX ADMIN — Medication 15 ML: at 12:11

## 2024-03-09 RX ADMIN — VANCOMYCIN HYDROCHLORIDE 1250 MG: 10 INJECTION, POWDER, LYOPHILIZED, FOR SOLUTION INTRAVENOUS at 09:14

## 2024-03-09 RX ADMIN — BUMETANIDE 2 MG: 0.25 INJECTION INTRAMUSCULAR; INTRAVENOUS at 09:14

## 2024-03-09 RX ADMIN — PIPERACILLIN AND TAZOBACTAM 3.38 G: 3; .375 INJECTION, POWDER, FOR SOLUTION INTRAVENOUS at 20:05

## 2024-03-09 RX ADMIN — OXYCODONE HYDROCHLORIDE 10 MG: 10 TABLET ORAL at 00:56

## 2024-03-09 RX ADMIN — URSODIOL 300 MG: 300 CAPSULE ORAL at 20:01

## 2024-03-09 RX ADMIN — Medication 1 PACKET: at 16:15

## 2024-03-09 RX ADMIN — Medication 1 PACKET: at 20:26

## 2024-03-09 RX ADMIN — BUMETANIDE 2 MG: 0.25 INJECTION INTRAMUSCULAR; INTRAVENOUS at 18:33

## 2024-03-09 RX ADMIN — CALCIUM CHLORIDE, MAGNESIUM CHLORIDE, SODIUM CHLORIDE, SODIUM BICARBONATE, POTASSIUM CHLORIDE AND SODIUM PHOSPHATE DIBASIC DIHYDRATE 12.5 ML/KG/HR: 3.68; 3.05; 6.34; 3.09; .314; .187 INJECTION INTRAVENOUS at 04:52

## 2024-03-09 RX ADMIN — Medication 1 PACKET: at 12:40

## 2024-03-09 RX ADMIN — INSULIN HUMAN 4 UNITS/HR: 1 INJECTION, SOLUTION INTRAVENOUS at 06:23

## 2024-03-09 RX ADMIN — MYCOPHENOLATE MOFETIL 750 MG: 250 CAPSULE ORAL at 07:46

## 2024-03-09 RX ADMIN — OXYCODONE HYDROCHLORIDE 10 MG: 10 TABLET ORAL at 14:33

## 2024-03-09 RX ADMIN — MICAFUNGIN SODIUM 100 MG: 50 INJECTION, POWDER, LYOPHILIZED, FOR SOLUTION INTRAVENOUS at 12:11

## 2024-03-09 RX ADMIN — MYCOPHENOLATE MOFETIL 750 MG: 250 CAPSULE ORAL at 18:38

## 2024-03-09 RX ADMIN — PIPERACILLIN AND TAZOBACTAM 3.38 G: 3; .375 INJECTION, POWDER, FOR SOLUTION INTRAVENOUS at 14:34

## 2024-03-09 RX ADMIN — OXYCODONE HYDROCHLORIDE 10 MG: 10 TABLET ORAL at 18:50

## 2024-03-09 RX ADMIN — PREDNISONE 25 MG: 20 TABLET ORAL at 07:46

## 2024-03-09 RX ADMIN — Medication 40 MG: at 07:46

## 2024-03-09 RX ADMIN — PIPERACILLIN AND TAZOBACTAM 3.38 G: 3; .375 INJECTION, POWDER, FOR SOLUTION INTRAVENOUS at 07:47

## 2024-03-09 RX ADMIN — THIAMINE HCL TAB 100 MG 100 MG: 100 TAB at 07:46

## 2024-03-09 RX ADMIN — TACROLIMUS 4 MG: 1 CAPSULE ORAL at 18:38

## 2024-03-09 RX ADMIN — SENNOSIDES AND DOCUSATE SODIUM 1 TABLET: 8.6; 5 TABLET ORAL at 20:02

## 2024-03-09 RX ADMIN — OXYCODONE HYDROCHLORIDE 10 MG: 10 TABLET ORAL at 10:40

## 2024-03-09 RX ADMIN — SULFAMETHOXAZOLE AND TRIMETHOPRIM 1 TABLET: 400; 80 TABLET ORAL at 20:01

## 2024-03-09 RX ADMIN — VALGANCICLOVIR 450 MG: 450 TABLET, FILM COATED ORAL at 20:03

## 2024-03-09 RX ADMIN — URSODIOL 300 MG: 300 CAPSULE ORAL at 07:46

## 2024-03-09 RX ADMIN — CALCIUM CHLORIDE, MAGNESIUM CHLORIDE, SODIUM CHLORIDE, SODIUM BICARBONATE, POTASSIUM CHLORIDE AND SODIUM PHOSPHATE DIBASIC DIHYDRATE 12.5 ML/KG/HR: 3.68; 3.05; 6.34; 3.09; .314; .187 INJECTION INTRAVENOUS at 01:07

## 2024-03-09 ASSESSMENT — ACTIVITIES OF DAILY LIVING (ADL)
ADLS_ACUITY_SCORE: 36

## 2024-03-09 NOTE — PLAN OF CARE
Major Shift Events:    AxO X3. Assist of 2 getting to chair, stood and pivot to commode and chair.   RA, sinus tach, HR low 100s.   Large amount of urine output following bumex. Moore removed per SICU at 1300.   Regular diet, tolerated clear liquid diet in AM. Stool X2.   Plan: Transferred to 7A @1600  For vital signs and complete assessments, please see documentation flowsheets.

## 2024-03-09 NOTE — PLAN OF CARE
Major Shift Events:    Confused, AxO X2 making nonsensical comments or questions. Stood and pivoted to chair with OT  Extubated @1045 to 2 L NC, tolerating well.   Sinus tach following extubation, as high as 120s, usually sits between 105-110. SBP consistently 110-120.  Tube feeding at goal. 1 large loose BM, 1 small loose BM, no suppository given  CRRT goal 0-150, tolerating well. Bumex given X1,   [id number] TIMOTHY drain put out 400 ml of serosang drainage, SICU notified, no interventions, no other large dumps throughout day. No vital signs changed post dump  A line removed  Plan: CRRT off tomorrow to HD depending urine output.   For vital signs and complete assessments, please see documentation flowsheets.

## 2024-03-09 NOTE — PROGRESS NOTES
Immunosuppression Management Note:    Mary Lopez is a 53 year old male who is seen today  for immunosuppression management     I, Ryder Cortez MD, I have examined the patient with our JOSELYN/Fellow as part of a shared visit.   I participated in the rounds,  discussed and agree with the note and findings and  reviewed today's vital signs, medications, labs and imaging as noted in this note.  I  reviewed the  immunosuppression medications.  I personally provided a substantive portion of the care of this patient. I personally performed the immunosuppressive management of this patient, reviewed the overall  immunosuppression including drug levels, allograft function and provided the recommendations to adjust the dose to provide optimal levels to prevent rejection of the allograft and prevent toxicity to the organs. This was complex care due to the fresh allograft.   Time spent: evaluating patient, examining patient, discussion of plan, counseling and documentation: >35 min   I spoke to the patient/family and explained below clinical details and answered all the questions   Transplant Surgery  Inpatient Daily Progress Note  2024    Assessment & Plan: Mary Lopez is a 53 year old male with a history of EtOH cirrhosis, complicated by encephalopathy, hyponatremia, chronic thrombocytopenia, macrocytic anemia, with hospitalization (2024) for encephalopathy and suspected SBP, and recent admission -3/2 for Strep bovis bacteremia. He is now s/p a  donor liver transplant on 3/5/24 with biliary stent placement with Dr. Cortez. Pre-op MELD 31.    DDLTx with stent: POD 4   - LFTs downtrending except mild TB elevation, 1.7 (1.3).  Jose Roberto BID. ASA 81mg daily  -Plan for HIDA 3/11 due to bile duct size mismatch (donor 5 mm, recipient 10 mm)   -Postop Liver US: Normal liver, patent doppler. Hematoma inferior to the right lobe   Immunosuppression management: Induction with Solu-Medrol/pred  taper  Maintenance:  MMF: 750 mg BID  Tac 4 mg BID, goal 6-8 renal spairing  Pred 5 while tac <8  Neurology:   Acute postoperative pain:  Oxycodone, Dilaudid PRN  Hematology: Acute blood loss anemia. Received 10 units pRBCs post OR, last transfused 3/6.  Hgb stable 8-9  Thrombocytopenia: due to liver disease, Plts uptrending, ~100  Coagulopathy: INR 1.32, fibrinogen >200  Cardiorespiratory: Acute respiratory failure: Extubated in the OR, re-intubated shortly after transfer to the ICU. Extubated 3/8.  Maintaining 2L NC. Pulm toilet, CDB/IS.  Bilateral pleural effusions: L pleural effusion stable, R pleural effusion slightly improved.  Circulatory failure requiring pressor support: Resolved, off pressors  GI/Nutrition: Diet: CLD +Renal EN, increase to Regular diet as sharon  Diarrhea: change bowel regimen to PRN  Endocrine: Type 2 diabetes mellitus with steroid hyperglycemia: on insulin drip   Fluid/Electrolytes:   Electrolytes per ICU  Renal: ANGEL, oliguric. Nephrology consulted, CRRT initiated 3/7 -stop. Responsive to diuresis.  Hypervolemia: Initially with CRRT for volume removal. Diuresed with lasix per ICU.  Bumex TID per neph. Stop CRRT.  : Moore to remain due to critical status/diuresis. Likely remove tomorrow.  Infectious disease: Continue vanc/zosyn  H/o S bovis bacteremia:   Prophylaxis: DVT, fall, GI(PPI), fungal (diflucan-change to Micafungin d/t CRRT )  Zosyn, Vanco  Disposition: SICU-transfer to , PT/OT    Medical Decision Making: High  Subsequent visit 77799 (high level decision making)    JOSELYN/Fellow/Resident Provider: Isatu Perla NP      Faculty: Ryder Cortez M.D.      Medical Decision Making: High  Subsequent visit [id number] (high level decision making)    __________________________________________________________________  Transplant History: Admitted 3/4/2024 for  donor liver transplant.   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    3/5/2024 (Liver),  "Postoperative day: 4     Interval History: History is obtained from the electronic health record and patient     Overnight events: extubated to 2L , tolerating EN/CLD. Reporting some loose stool. Pain 8/10.    ROS:   A 10-point review of systems was negative except as noted above.    Curent Meds:   [Held by provider] aspirin  325 mg Oral or Feeding Tube Daily    micafungin  100 mg Intravenous Daily    multivitamins w/minerals  15 mL Per Feeding Tube Daily    mycophenolate  750 mg Oral BID IS    Or    mycophenolate  750 mg Oral or NG Tube BID IS    [START ON 3/13/2024] nystatin  10 mL Swish & Swallow 4x Daily    pantoprazole  40 mg Per Feeding Tube QAM AC    piperacillin-tazobactam  3.375 g Intravenous Q6H    polyethylene glycol  17 g Oral or Feeding Tube BID    predniSONE  25 mg Oral Once    Followed by    [START ON 3/10/2024] predniSONE  10 mg Oral Once    [START ON 3/11/2024] predniSONE  5 mg Oral Daily    protein modular  1 packet Per Feeding Tube 4x Daily    senna-docusate  1-2 tablet Oral or Feeding Tube BID    sodium chloride (PF)  3 mL Intravenous Q8H    sulfamethoxazole-trimethoprim  1 tablet Oral or Feeding Tube QPM    tacrolimus  2 mg Oral BID IS    Or    tacrolimus  2 mg Oral or NG Tube BID IS    thiamine  100 mg Oral or Feeding Tube Daily    ursodiol  300 mg Oral BID    Or    ursodiol  300 mg Oral or NG Tube BID    valGANciclovir  450 mg Oral QPM    Or    valGANciclovir  450 mg Oral or NG Tube QPM    vancomycin  1,250 mg Intravenous Q24H       Physical Exam:     Admit Weight: 107.2 kg (236 lb 4.8 oz)    Current Vitals:   /80   Pulse 103   Temp 99.1  F (37.3  C) (Bladder)   Resp 12   Ht 1.73 m (5' 8.11\")   Wt 112.9 kg (248 lb 14.4 oz)   SpO2 99%   BMI 37.72 kg/m      CVP (mmHg): 27 mmHg    Vital sign ranges:    Temp:  [97.7  F (36.5  C)-99.3  F (37.4  C)] 99.1  F (37.3  C)  Pulse:  [] 103  Resp:  [11-29] 12  BP: (110-158)/() 129/80  MAP:  [83 mmHg-92 mmHg] 92 mmHg  Arterial Line " BP: (118-125)/(62-71) 125/71  FiO2 (%):  [40 %] 40 %  SpO2:  [90 %-100 %] 99 %  Patient Vitals for the past 24 hrs:   BP Temp Temp src Pulse Resp SpO2 Weight   03/09/24 0623 -- -- -- -- 12 -- --   03/09/24 0600 129/80 99.1  F (37.3  C) Bladder 103 13 99 % --   03/09/24 0500 132/78 99.3  F (37.4  C) Bladder 104 12 100 % --   03/09/24 0400 132/88 99.1  F (37.3  C) Bladder 108 11 100 % 112.9 kg (248 lb 14.4 oz)   03/09/24 0300 113/74 -- -- 105 11 97 % --   03/09/24 0200 132/81 -- -- 113 24 100 % --   03/09/24 0100 123/80 -- -- 106 11 100 % --   03/09/24 0000 116/83 99  F (37.2  C) Bladder 106 13 100 % --   03/08/24 2300 (!) 158/90 -- -- 112 22 100 % --   03/08/24 2200 137/81 -- -- 108 29 98 % --   03/08/24 2100 129/82 -- -- 104 20 99 % --   03/08/24 2000 (!) 125/106 99  F (37.2  C) Bladder 108 12 97 % --   03/08/24 1900 (!) 133/94 99.1  F (37.3  C) -- 109 12 96 % --   03/08/24 1800 129/87 -- -- 109 14 96 % --   03/08/24 1700 (!) 121/90 99.1  F (37.3  C) -- 110 16 95 % --   03/08/24 1600 135/87 99.1  F (37.3  C) Bladder 118 21 98 % --   03/08/24 1500 115/66 99.1  F (37.3  C) -- (!) 122 11 90 % --   03/08/24 1400 130/81 99.3  F (37.4  C) -- 112 16 99 % --   03/08/24 1300 (!) 132/100 99  F (37.2  C) -- 104 21 96 % --   03/08/24 1200 110/68 99.1  F (37.3  C) Bladder 103 15 99 % --   03/08/24 1100 -- 99  F (37.2  C) -- 115 19 100 % --   03/08/24 1000 -- 98.6  F (37  C) -- 74 20 100 % --   03/08/24 0900 -- 97.9  F (36.6  C) -- 65 16 97 % --   03/08/24 0800 -- 97.7  F (36.5  C) Bladder 63 15 99 % --   03/08/24 0700 -- 97.7  F (36.5  C) -- 57 16 99 % --     General Appearance: NAD  Skin: normal, warm  Heart: regular rate and rhythm  LungsNLB on 2L NC  Abdomen: Soft, appropriately tender, ND,  Incision with staples, CDI. TIMOTHY x1 with s/s output  : Moore catheter in place clear yellow output  Extremities: edema: bilaterally, +2 pitting  Neurologic: Awake, AO x3,     Frailty Scores          12/19/2023   Frailty Scores   Final  Score Frail   Final Score Number 4       Data:   [facility]  Recent Labs   Lab 03/09/24  0556 03/09/24  0457 03/09/24  0405 03/08/24  2224 03/08/24  1943 03/08/24  0617 03/08/24  0358 03/06/24  0503 03/06/24  0500 03/04/24  1257 03/04/24  1058   NA  --   --  137  --  138   < > 139   < > 136   < > 129*   POTASSIUM  --   --  4.1  --  4.2   < > 4.1   < > 4.0   < > 4.3   CHLORIDE  --   --  104  --  105   < > 105   < > 104   < > 96*   CO2  --   --  25  --  26   < > 25   < > 25   < > 25   * 193* 222*   < > 153*   < > 130*  134*   < > 154*   < > 392*   BUN  --   --  30.0*  --  31.7*   < > 36.8*   < > 23.6*   < > 17.5   CR  --   --  1.06  --  1.24*   < > 1.74*   < > 1.11   < > 1.08   GFRESTIMATED  --   --  84  --  70   < > 47*   < > 80   < > 83   MEG  --   --  7.9*  --  7.8*   < > 8.1*   < > 8.8   < > 8.7   ICAW  --   --  4.6  --  4.7   < > 4.5  4.6   < > 5.2   < >  --    MAG  --   --  2.2  --  2.3   < > 2.4*   < > 1.6*  --  1.1*   PHOS  --   --  4.4  --  4.8*   < > 4.9*   < > 3.8  --  2.3*   AMYLASE  --   --   --   --   --   --   --   --  22*  --  10*   LIPASE  --   --   --   --   --   --   --   --  5*  --   --    ALBUMIN  --   --  3.1*  --  3.2*   < > 3.1*   < > 2.3*   < > 2.6*   BILITOTAL  --   --  1.7*  --   --   --  1.3*   < > 2.6*   < > 12.7*   ALKPHOS  --   --  79  --   --   --  75   < > 47   < > 181*   AST  --   --  43  --   --   --  59*   < > 310*   < >  --    ALT  --   --  103*  --   --   --  124*   < > 294*   < > 50    < > = values in this interval not displayed.     CBC  Recent Labs   Lab 03/09/24  0405 03/08/24  1943   HGB 9.0* 8.7*   WBC 15.1* 16.6*   * 108*     COAGS  Recent Labs   Lab 03/09/24  0405 03/08/24  0358 03/06/24  2211 03/06/24  1603 03/06/24  1233 03/06/24  0816   INR 1.25* 1.32*   < > 1.41*   < > 1.57*   PTT  --   --   --  27  --  30    < > = values in this interval not displayed.      Urinalysis  Recent Labs   Lab Test 03/04/24  1042 02/23/24  1558   COLOR Yellow Yellow   APPEARANCE  Clear Clear   URINEGLC 500* Negative   URINEBILI Negative Small*   URINEKETONE Negative Negative   SG 1.007 1.009   UBLD Negative Negative   URINEPH 5.0 5.0   PROTEIN Negative Negative   NITRITE Negative Negative   LEUKEST Negative Negative   RBCU <1 <1   WBCU <1 <1     Virology:  Hepatitis C Antibody   Date Value Ref Range Status   03/04/2024 Nonreactive Nonreactive Final     Comment:     A nonreactive screening test result does not exclude the possibility of exposure to or infection with HCV. Nonreactive screening test results in individuals with prior exposure to HCV may be due to antibody levels below the limit of detection of this assay or lack of reactivity to the HCV antigens used in this assay. Patients with recent HCV infections (<3 months from time of exposure) may have false-negative HCV antibody results due to the time needed for seroconversion (average of 8 to 9 weeks).

## 2024-03-09 NOTE — PROGRESS NOTES
[facility]    ICU Progress Note     Primary Team: Transplant Surgery   Reason for Critical Care Admission: Ventilator Management and Hemodynamic Monitoring  Admitting Physician: Ryder Cortez MD  Date of Admission:  3/4/2024    Assessment: Critical Care   Mary Lopez is a 53 year old male admitted on 3/4/2024. He has a history of alcoholic cirrhosis complicated by hepatic encephalopathy and suspected SBP who presented to the hospital for consideration of  donor liver transplant. He was recently admitted for S.bovis bacteremia and was discharged on amoxicillin and levofloxacin. He underwent DDLT on 3/5/2024 and was admitted to the SICU following his surgery for ventilator management and hemodynamic monitoring. Intra-operatively, patient was transfused 7 U of pRBC and 10 units of platelets. He was extubated in the OR and upon admission to the ICU, he was on oxygen mask, off pressors and sedation. However, became unresponsive shortly after with oxygen saturation dropping to 70s and had to be re-intubated the same day. Needed multiple units of blood product transfusion on 3/6 with high amount of bloody TIMOTHY output. Currently stable on ventilator and off pressors.    CHANGES TODAY:  - Remove Moore   - Bumex q8h per nephrology   - Off CRRT, any further dialysis plan per nephrology team   - ADAT   - PTA trazodone PRN and melatonin jesus added for sleep   - Transfer to floor    Plan: Critical Care   Neuro/ pain/ sedation:  # Post-operative pain   # History of Hepatic Encephalopathy   - Monitor neurological status. Notify provider for any acute changes in exam  - Pain: PRN Oxy and dilaudid  - Sleep: PTA trazodone and melatonin PRN added   - Agitation: Seroquel 25 mg q6h PRN     Pulmonary:  # Acute hypoxic respiratory failure, resolved  # Pleural effusion, bilateral   - Extubated to NC on 3/8, wean off as tolerated    - Continue pulmonary hygiene  - CXR  (3/7): Bilateral pleural effusion, no significant changes compared to prior CXR      Cardiovascular:  # Multifactorial shock (likely hemorrhagic), resolved  - Monitor hemodynamic status.  - MAP goal > 65, off pressors  - PTA midodrine currently held    GI/Nutrition:  # S/p  Donor Liver Transplant   # Esophageal 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   # Portal Hypertension   #Severe Protein Calorie Malnutrition due to Acute Illness   - Diet: ADAT  - Nutrition consulted, post-pyloric feeding tube was placed on 3/6. TF at goal.   - Having multiple bowel movements, bowel regimen decreased   - Post-transplant prophylaxis: Tacrolimus, Mycophenolate and steroid taper   - Monitor daily hepatic panel   - TIMOTHY drain management per transplant, currently to gravity and emptied every 2 hours     Renal/ Fluid Balance:   # Hyponatremia, resolved   # Acute Kidney Injury  - Will monitor intake and output, adequate urine output following Bumex doses, Cr trended down with CRRT    - ICU electrolyte replacement protocol  - Monitor daily BMP  - PTA torsemide and spironolactone on hold for now     - MAC exchanged for dialysis line and a new central line is placed on 3/7  - Remove Moore   - Bumex Q8h per nephrology recommendations   - Off CRRT, any further dialysis plan per nephrology team     Endocrine:  # Stress Hyperglycemia   # Type 2 Diabetes Mellitus    - Maintain blood glucose levels < 180   - Insulin gtt   - Steroid taper per transplant team     ID:  # Hx of S.bovis bacteremia   - Monitor fever curve and daily CBC   - Post-transplant prophylaxis       - Vanc/Zosyn x 5 days        - Fluconazole x 7 days changed to micafungin x 14 days per transplant        - Valcyte        - Bactrim          Hematology:  # Acute Blood Loss Anemia  # Macrocytic Anemia   # Chronic Thrombocytopenia  - Monitor daily CBC   -  mg held with concern for bleeding   - Hgb > 8, INR < 1.5, Fibrinogen > 200, PLT > 100 per transplant surgery, Fibrinogen 175 this  "AM; however, will hold off on further transfusion to prevent volume overload    - Received multiple units intra- and post-operatively, no transfusion since 3/7 AM     MSK:   - PT and OT consulted. Appreciate recommendations.    Lines/ tubes/ drains: L internal jugular triple lumen CVC x 1, R internal jugular dialysis line, PIV x 2, intraabdominal Teresa AGUIRRE (remove today)    Prophylaxis:,   - DVT Prophylaxis: Pneumatic Compression Devices, currently no DVT prophylaxis per transplant surgery   - PUD Prophylaxis: PPI (on PTA omeprazole)    Code Status: Full Code      Disposition:  - Plan to transfer to floor today    The patient's care was discussed with the Attending Physician, Dr. Starr .    Clinically Significant Risk Factors              # Hypoalbuminemia: Lowest albumin = 2 g/dL at 3/5/2024  4:09 PM, will monitor as appropriate    # Coagulation Defect: INR = 1.25 (Ref range: 0.85 - 1.15) and/or PTT = 27 Seconds (Ref range: 22 - 38 Seconds), will monitor for bleeding  # Thrombocytopenia: Lowest platelets = 48 in last 2 days, will monitor for bleeding   # Hypertension: Noted on problem list        # Obesity: Estimated body mass index is 37.72 kg/m  as calculated from the following:    Height as of this encounter: 1.73 m (5' 8.11\").    Weight as of this encounter: 112.9 kg (248 lb 14.4 oz)., PRESENT ON ADMISSION  # Severe Malnutrition: based on nutrition assessment, PRESENT ON ADMISSION     # Financial/Environmental Concerns: none            Nirmal Presley MD  [facility]  Securely message with dough (more info)  Text page via [facility] Paging/Directory   _____________________________________________________________________    Chief Complaint   EtOH cirrhosis s/p DDLT     History of Present Illness   Mary Lopez is a 53 year old male with a history of type 2 DM, HTN, HLD, alcoholic cirrhosis complicated by hepatic encephalopathy, hyponatremia, chronic " thrombocytopenia, macrocytic anemia and suspected SBP who presented to the hospital for consideration of  donor liver transplant. He was recently admitted for S.bovis bacteremia and was discharged on amoxicillin and levofloxacin. He was reactivate on the transplant list as of 2024, and underwent DDLT on 3/5/2024 as a  donor organ became available.     Review of Systems    The 10 point Review of Systems is negative other than noted in the HPI or here.     Past Medical History    I have reviewed this patient's medical history and updated it with pertinent information if needed.   Past Medical History:   Diagnosis Date    ANGEL (acute kidney injury) (H24)     Alcoholic cirrhosis of liver without ascites (H) 2023    Alcoholic hepatitis with ascites (H28) 10/03/2023    Alcoholic hepatitis without ascites (H28) 2023    Closed fracture of one rib of left side 2023    Concussion without loss of consciousness 2020    Decompensated hepatic cirrhosis (H) 09/15/2023    Diabetes mellitus, type 2 (H) 2023    Essential hypertension 2020    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)     Mild hyperlipidemia 2021    Persistent insomnia 2023    Portal hypertension (H) 2023    Scrotal abscess     Secondary esophageal 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 (H) 2023    Tobacco abuse disorder 2020    Type 2 diabetes mellitus with hyperglycemia (H) 2023     Past Surgical History   I have reviewed this patient's surgical history and updated it with pertinent information if needed.  Past Surgical History:   Procedure Laterality Date    BENCH LIVER  3/5/2024    Procedure: Bench liver;  Surgeon: Ryder Cortez MD;  Location: [facility] OR    CHOLECYSTECTOMY      COLONOSCOPY N/A 2024    Procedure: COLONOSCOPY, WITH POLYPECTOMY;  Surgeon: Jak Urbina MD;  Location: [facility]    CV RIGHT HEART CATH MEASUREMENTS RECORDED N/A 2024    Procedure: Right  Heart Catheterization;  Surgeon: Alfred Tafoya MD;  Location:  HEART CARDIAC CATH LAB    TONSILLECTOMY      TRANSPLANT LIVER RECIPIENT  DONOR N/A 3/5/2024    Procedure: Transplant liver recipient  donor, bile duct stent placement;  Surgeon: Ryder Cortez MD;  Location: [facility] OR    VASECTOMY       Social History   I have reviewed this patient's social history and updated it with pertinent information if needed.    Social History     Tobacco Use    Smoking status: Former     Types: Cigarettes     Passive exposure: Never    Smokeless tobacco: Current     Types: Chew     Last attempt to quit: 2004    Tobacco comments:     Chew daily 1/3 of a tin per day   Vaping Use    Vaping Use: Never used   Substance Use Topics    Alcohol use: Not Currently     Alcohol/week: 12.0 standard drinks of alcohol     Types: 12 Standard drinks or equivalent per week     Comment: Sober since 2023    Drug use: Not Currently     Family History   I have reviewed this patient's family history and updated it with pertinent information if needed.  Family History   Problem Relation Age of Onset    Anxiety Disorder Mother     Depression Mother     Bipolar Disorder Mother     Chronic Obstructive Pulmonary Disease Mother     Lung Cancer Mother 81    Morbid Obesity Father     Diabetes Father     Diabetes Type 2  Brother     Substance Abuse Maternal Grandfather     Substance Abuse Paternal Grandfather     Colon Cancer No family hx of     Liver Disease No family hx of      Prior to Admission Medications   Prior to Admission Medications   Prescriptions Last Dose Informant Patient Reported? Taking?   Continuous Blood Gluc Sensor (DEXCOM G7 SENSOR) MISC   No No   Sig: Change every 10 days.   HYDROcodone-acetaminophen (NORCO) 5-325 MG tablet 3/3/2024  Yes Yes   Sig: Take 1 tablet by mouth every 6 hours as needed for severe pain Patient took 1 tablet last night for pain.   Insulin Lispro (HUMALOG KWIKPEN) 200 UNIT/ML soln  3/4/2024  No Yes   Sig: Inject 8 Units Subcutaneous 4 times daily (with meals and nightly) Give 8 units before each meal. Give additional units for correction with sliding scale (1 unit for each order of 35 blood glucose >140 before meals).   amoxicillin (AMOXIL) 500 MG capsule 3/4/2024  No Yes   Sig: Take 2 capsules (1,000 mg) by mouth every 8 hours for 7 days   blood glucose (NO BRAND SPECIFIED) test strip   No No   Sig: Use to test blood sugar two times daily or as directed. To accompany: Blood Glucose Monitor Brands: per insurance.   blood glucose monitoring (NO BRAND SPECIFIED) meter device kit   No No   Sig: Use to test blood sugar twice times daily or as directed. Preferred blood glucose meter OR supplies to accompany: Blood Glucose Monitor Brands: per insurance.   folic acid (FOLVITE) 1 MG tablet 3/4/2024  No Yes   Sig: Take 1 tablet (1 mg) by mouth daily   insulin degludec (TRESIBA FLEXTOUCH) 100 UNIT/ML pen 3/3/2024 at 2:30 pm  No Yes   Sig: Inject 36 Units Subcutaneous daily Inject 36 units daily.   insulin lispro (HUMALOG KWIKPEN) 100 UNIT/ML (1 unit dial) KWIKPEN 3/4/2024  No Yes   Sig: Inject 1-10 Units Subcutaneous 3 times daily (before meals) for 50 days Correction scale: Give 1 unit insulin for every order of 35 that blood glucose level is >140 three times daily before meals and for every order of 35 that blood glucose is >200 at bedtime   insulin pen needle (BD PEN NEEDLE SHERRIE 2ND GEN) 32G X 4 MM miscellaneous   No No   Sig: USES 5 PER DAY   lactulose (CHRONULAC) 10 GM/15ML solution 3/4/2024  No Yes   Sig: TAKE 30 MLS BY MOUTH 2 TIMES DAILY   Patient taking differently: TAKE 30 MLS BY MOUTH 3 TIMES DAILY   levofloxacin (LEVAQUIN) 500 MG tablet 3/4/2024  No Yes   Sig: Take 1 tablet (500 mg) by mouth daily for 6 days   melatonin 10 MG TABS tablet 3/3/2024  No Yes   Sig: Take 1 tablet (10 mg) by mouth nightly as needed for sleep   midodrine (PROAMATINE) 10 MG tablet 3/4/2024  No Yes   Sig: Take 1  tablet (10 mg) by mouth every 8 hours for 30 days   multivitamin w/minerals (THERA-VIT-M) tablet 3/4/2024  No Yes   Sig: TAKE 1 TABLET BY MOUTH DAILY   omeprazole (PRILOSEC) 20 MG DR capsule 3/4/2024  Yes Yes   Sig: Take 1 capsule (20 mg) by mouth 2 times daily   potassium chloride ER (KLOR-CON M) 10 MEQ CR tablet 3/4/2024  No Yes   Sig: Take 2 tablets (20 mEq) by mouth 2 times daily   rifaximin (XIFAXAN) 550 MG TABS tablet 3/4/2024  No Yes   Sig: Take 1 tablet (550 mg) by mouth 2 times daily for 180 days   spironolactone (ALDACTONE) 25 MG tablet 3/4/2024  No Yes   Sig: Take 1 tablet (25 mg) by mouth daily   thiamine (B-1) 100 MG tablet 3/4/2024  No Yes   Sig: Take 1 tablet (100 mg) by mouth daily for 30 days   thin (NO BRAND SPECIFIED) lancets   No No   Sig: Use with lanceting device. To accompany: Blood Glucose Monitor Brands: per insurance.   torsemide (DEMADEX) 10 MG tablet 3/4/2024  No Yes   Sig: Take 3 tablets (30 mg) by mouth daily as needed Take as needed for fluid once daily if systolic blood pressure (top number) is 100 or greater.   traZODone (DESYREL) 50 MG tablet 3/3/2024  No Yes   Sig: Take 0.5 tablets (25 mg) by mouth nightly as needed for sleep      Facility-Administered Medications: None     Allergies   Allergies   Allergen Reactions    Other Food Allergy Anaphylaxis     Legumes (black beans, baked beans, chickpeas)    Pineapple Itching     Physical Exam   Vital Signs: Temp: 99.1  F (37.3  C) Temp src: Bladder BP: 129/80 Pulse: 103   Resp: 12 SpO2: 94 % O2 Device: None (Room air) Oxygen Delivery: 2 LPM  Weight: 248 lbs 14.39 oz    General: NAD  HEENT: On nasal cannula, feeding tube in place   Neuro: Awake and alert, but appears confused  CV: RRR   Pulm: Coarse breath sounds bilaterally, but unlabored breathing on nasal cannula   Abd: Soft, non-distended, TIMOTHY with serosanguineous output   : Moore in place   Extremities: Warm and well perfused, 3+ pitting edema on BLE   Incisions: Covered with  dressing, clean and dry       Data   I reviewed all medications, new labs and imaging results over the last 24 hours.    Arterial Blood Gases   Recent Labs   Lab 03/07/24  0348 03/06/24  1808 03/06/24  1235 03/06/24  0501   PH 7.45 7.46* 7.45 7.48*   PCO2 37 37 37 35   PO2 103 76* 136* 66*   HCO3 25 26 26 26     Complete Blood Count   Recent Labs   Lab 03/09/24  0405 03/08/24 1943 03/08/24  1137 03/08/24  0358   WBC 15.1* 16.6* 11.2* 9.5   HGB 9.0* 8.7* 8.1* 7.9*   * 108* 70* 55*     Basic Metabolic Panel  Recent Labs   Lab 03/09/24  0656 03/09/24  0556 03/09/24  0457 03/09/24 0405 03/08/24 2224 03/08/24 1943 03/08/24  1142 03/08/24 1137 03/08/24  0617 03/08/24  0358   NA  --   --   --  137  --  138  --  139  --  139   POTASSIUM  --   --   --  4.1  --  4.2  --  4.1  --  4.1   CHLORIDE  --   --   --  104  --  105  --  106  --  105   CO2  --   --   --  25  --  26  --  24  --  25   BUN  --   --   --  30.0*  --  31.7*  --  32.8*  --  36.8*   CR  --   --   --  1.06  --  1.24*  --  1.47*  --  1.74*   * 174* 193* 222*   < > 153*   < > 174*   < > 130*  134*    < > = values in this interval not displayed.     Liver Function Tests  Recent Labs   Lab 03/09/24 0405 03/08/24 1943 03/08/24 1137 03/08/24 0358 03/07/24 2036 03/07/24  0348 03/06/24  2211 03/06/24  1603 03/06/24  1233   AST 43  --   --  59*  --  109*  --   --  156*   *  --   --  124*  --  152*  --   --  183*   ALKPHOS 79  --   --  75  --  56  --   --  40   BILITOTAL 1.7*  --   --  1.3*  --  1.5*  --   --  1.6*   ALBUMIN 3.1* 3.2* 3.0* 3.1*   < > 2.8*  --   --  2.4*   INR 1.25*  --   --  1.32*  --  1.28* 1.29*   < > 1.59*    < > = values in this interval not displayed.     Pancreatic Enzymes  Recent Labs   Lab 03/06/24  0500 03/04/24  1058   LIPASE 5*  --    AMYLASE 22* 10*     Coagulation Profile  Recent Labs   Lab 03/09/24  0405 03/08/24  0358 03/07/24  0348 03/06/24  2211 03/06/24  1603 03/06/24  1233 03/06/24  0816 03/05/24  2338  03/05/24  1609 03/05/24  1519   INR 1.25* 1.32* 1.28* 1.29* 1.41*   < > 1.57*   < > 2.12* 2.12*   PTT  --   --   --   --  27  --  30  --  87* 89*    < > = values in this interval not displayed.     IMAGING:  No results found for this or any previous visit (from the past 24 hour(s)).

## 2024-03-09 NOTE — PROGRESS NOTES
"CRRT STATUS NOTE    DATA:  Time:  1829  Pressures WNL:  YES  Filter Status:  WDL    Problems Reported/Alarms Noted:  None    Supplies Present:  Yes    ASSESSMENT:  Patient Net Fluid Balance:      Intake/Output Summary (Last 24 hours) at 3/8/2024 1829  Last data filed at 3/8/2024 1800  Gross per 24 hour   Intake 3586.53 ml   Output 6134 ml   Net -2547.47 ml        Vital Signs:  BP (!) 121/90   Pulse 110   Temp 99.1  F (37.3  C)   Resp 16   Ht 1.73 m (5' 8.11\")   Wt 115 kg (253 lb 8.5 oz)   SpO2 95%   BMI 38.42 kg/m          Labs:   Lab Results   Component Value Date    WBC 11.2 (H) 03/08/2024    HGB 8.1 (L) 03/08/2024    HCT 24.3 (L) 03/08/2024    MCV 91 03/08/2024    PLT 70 (L) 03/08/2024     Lab Results   Component Value Date     03/08/2024    POTASSIUM 4.1 03/08/2024    CHLORIDE 106 03/08/2024    CO2 24 03/08/2024     (H) 03/08/2024     Lab Results   Component Value Date    INR 1.32 (H) 03/08/2024     Lab Results   Component Value Date    BUN 32.8 (H) 03/08/2024    CR 1.47 (H) 03/08/2024         Goals of Therapy:  0-200mL/hr    INTERVENTIONS: Continue fluid removal per goals of care as patient tolerates. Check filter daily. Change filter q72 hours and PRN. Please call CRRT Resource RN on [facility] with any questions or concerns.     "

## 2024-03-09 NOTE — PROGRESS NOTES
[facility]   Transplant Nephrology Progress Note  Date of Admission:  3/4/2024  Today's Date: 03/09/2024    Recommendations:  - Stop CRRT  -Start bumex 2mg IV q8h and follow UOP  -Will evaluate for iHD needs. Keep line in place for now    Assessment & Plan   # ANGEL: Increased serum creatinine post OLT.   - ANGEL felt secondary to volume shifts with liver transplant.   - Baseline Creatinine: ~ 0.7-0.9   - Proteinuria: Normal (<0.2 grams) 12/2023   -Stop CRRT. Start bumex 2mg IV q8h.    -Dialysis access: RIJ temp line. Keep in place for now.     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  Transaminases Trend down.  Followed by Transplant Surgery.   - 3/6/24 Transplant Renal Ultrasound:  Hematoma inferior to the right lobe of the liver     # Immunosuppression: Tacrolimus immediate release (goal 6-8), Mycophenolate mofetil (dose 750 mg every 12 hours) and Prednisone (dose taper)   - Induction with Recent Transplant:  Per Liver Tx Protocol   - Continue with intensive monitoring of immunosuppression for efficacy and toxicity.   - Goal tacrolimus level lower due to ANGEL.   - Changes: Management per Transplant Surgery.    # Infection Prophylaxis:   - PJP: Sulfa/TMP (Bactrim)  - CMV: Valganciclovir (Valcyte), CMV IgG Ab negative, follow-up donor CMV status.  - Thrush: Nystatin (Mycostatin) swish and swallow and Fluconazole (Diflucan)  - Fungal: Fluconazole (Diflucan)    # Blood Pressure: Controlled;  Goal BP: < 150/90   - Volume status: Total body volume up, but intravascularly euvolemic     - Changes: Yes - stop CRRT, start bumex 2mg IV q8h with increasing UOP     # Type 2 Diabetes: Controlled (HbA1c <7%) Last HbA1c: 4.8%   - Management as per primary team.  On insulin gtt.    # Anemia in Chronic Disease/Surgery: Hgb: Stable      MAITE: No   - Iron studies: Replete (12/2023)    # Thrombocytopenia: Stable    # Mineral Bone Disorder:   -  Vitamin D; level:  26 (12/2023)           On supplement: No  - Calcium; level: Low normal           On supplement: No  - Phosphorus; level: Normal   On supplement: No    # Electrolytes:   - Potassium; level: Normal         On supplement: No  - Magnesium; level: Normal        On supplement: No  - Bicarbonate; level: Normal         On supplement: No  - Sodium; level: Normal    # Transplant History:  Etiology of Organ Failure: Alcohol-related liver disease  Tx: Liver Tx  Transplant: 3/5/2024 (Liver)  Significant changes in immunosuppression: Slightly lower tacrolimus level goal due to ANGEL.  Significant transplant-related complications: None      Recommendations were communicated to the primary team verbally    Kirill Gray MD   Transplant Nephrology  Contact information available via [facility] Paging/Directory    Interval History   CRRT stopped today.   Now extubated  Confused  UOP increasing with bumex    Review of Systems   4 point ROS was obtained and negative except as noted in the Interval History.    MEDICATIONS:    [Held by provider] aspirin  325 mg Oral or Feeding Tube Daily     bumetanide  2 mg Intravenous Q8H     micafungin  100 mg Intravenous Daily     multivitamins w/minerals  15 mL Per Feeding Tube Daily     mycophenolate  750 mg Oral BID IS    Or     mycophenolate  750 mg Oral or NG Tube BID IS     pantoprazole  40 mg Per Feeding Tube QAM AC     piperacillin-tazobactam  3.375 g Intravenous Q6H     [START ON 3/10/2024] polyethylene glycol  17 g Oral or Feeding Tube Daily     [START ON 3/10/2024] predniSONE  10 mg Oral Once     [START ON 3/11/2024] predniSONE  5 mg Oral Daily     protein modular  1 packet Per Feeding Tube 4x Daily     senna-docusate  1 tablet Oral or Feeding Tube BID     sodium chloride (PF)  3 mL Intravenous Q8H     sulfamethoxazole-trimethoprim  1 tablet Oral or Feeding Tube QPM     tacrolimus  3 mg Oral BID IS     thiamine  100 mg Oral or Feeding Tube Daily     ursodiol  300 mg Oral BID     "Or     ursodiol  300 mg Oral or NG Tube BID     valGANciclovir  450 mg Oral QPM    Or     valGANciclovir  450 mg Oral or NG Tube QPM     vancomycin  1,250 mg Intravenous Q24H       dextrose       dextrose       insulin regular 4 Units/hr (24 1000)     BETA BLOCKER NOT PRESCRIBED         Physical Exam   Temp  Av.1  F (36.7  C)  Min: 96.3  F (35.7  C)  Max: 100.2  F (37.9  C)  Arterial Line BP  Min: 85/53  Max: 141/72  Arterial Line MAP (mmHg)  Av.3 mmHg  Min: 59 mmHg  Max: 103 mmHg      Pulse  Av.6  Min: 50  Max: 129 Resp  Av.5  Min: 11  Max: 25  FiO2 (%)  Av.8 %  Min: 40 %  Max: 60 %  SpO2  Av.9 %  Min: 88 %  Max: 100 %    CVP (mmHg): (S) 17 mmHg (Transplant notified)/76   Pulse 103   Temp 98.6  F (37  C)   Resp 12   Ht 1.73 m (5' 8.11\")   Wt 112.9 kg (248 lb 14.4 oz)   SpO2 98%   BMI 37.72 kg/m     Date 24 0700 - 24 0659   Shift 6220-6259 5486-4500 8622-4374 24 Hour Total   INTAKE   I.V. 52.2   52.2   Enteral 40   40   Shift Total(mL/kg) 92.2(0.8)   92.2(0.8)   OUTPUT   Other 124   124   Shift Total(mL/kg) 124(1.08)   124(1.08)   Weight (kg) 115 115 115 115      Admit Weight: 107.2 kg (236 lb 4.8 oz)     GENERAL APPEARANCE: alert and no distress  HENT: mouth without ulcers or lesions  LYMPHATICS: no cervical or supraclavicular nodes  RESP: lungs clear to auscultation - no rales, rhonchi or wheezes  CV: regular rhythm, normal rate, no rub, no murmur  EDEMA: no LE edema bilaterally  ABDOMEN: soft, nondistended, nontender, bowel sounds normal  MS: extremities normal - no gross deformities noted, no evidence of inflammation in joints, no muscle tenderness  SKIN: no rash  NEURO: normal strength and tone, sensory exam grossly normal, mentation intact and speech normal  PSYCH: confused  DIALYSIS ACCESS:  Temporary catheter RIJ    Data   All labs reviewed by me.  CMP  Recent Labs   Lab 24  1253 24  0956 24  0804 24  0801 24  0457 " 03/09/24  0405 03/08/24  2224 03/08/24  1943 03/08/24  1142 03/08/24  1137 03/08/24  0617 03/08/24  0358 03/07/24  0352 03/07/24  0348 03/06/24  1242 03/06/24  1233   NA  --   --   --  139  --  137  --  138  --  139  --  139   < > 138   < > 137   POTASSIUM  --   --   --  3.6  --  4.1  --  4.2  --  4.1  --  4.1   < > 4.2   < > 4.2   CHLORIDE  --   --   --  107  --  104  --  105  --  106  --  105   < > 103   < > 104   CO2  --   --   --  25  --  25  --  26  --  24  --  25   < > 24   < > 25   ANIONGAP  --   --   --  7  --  8  --  7  --  9  --  9   < > 11   < > 8   * 129* 180* 188*   < > 222*   < > 153*   < > 174*   < > 130*  134*   < > 139*   < > 143*   BUN  --   --   --  29.4*  --  30.0*  --  31.7*  --  32.8*  --  36.8*   < > 37.6*   < > 26.6*   CR  --   --   --  1.06  --  1.06  --  1.24*  --  1.47*  --  1.74*   < > 2.06*   < > 1.45*   GFRESTIMATED  --   --   --  84  --  84  --  70  --  57*  --  47*   < > 38*   < > 58*   MEG  --   --   --  7.8*  --  7.9*  --  7.8*  --  7.7*  --  8.1*   < > 8.8   < > 8.8   MAG  --   --   --   --   --  2.2  --  2.3  --  2.4*  --  2.4*   < > 2.2   < >  --    PHOS  --   --   --   --   --  4.4  --  4.8*  --  4.8*  --  4.9*   < > 5.7*   < >  --    PROTTOTAL  --   --   --   --   --  5.0*  --   --   --   --   --  4.9*  --  4.5*  --  3.7*   ALBUMIN  --   --   --   --   --  3.1*  --  3.2*  --  3.0*  --  3.1*   < > 2.8*  --  2.4*   BILITOTAL  --   --   --   --   --  1.7*  --   --   --   --   --  1.3*  --  1.5*  --  1.6*   ALKPHOS  --   --   --   --   --  79  --   --   --   --   --  75  --  56  --  40   AST  --   --   --   --   --  43  --   --   --   --   --  59*  --  109*  --  156*   ALT  --   --   --   --   --  103*  --   --   --   --   --  124*  --  152*  --  183*    < > = values in this interval not displayed.     CBC  Recent Labs   Lab 03/09/24  0405 03/08/24  1943 03/08/24  1137 03/08/24  0358   HGB 9.0* 8.7* 8.1* 7.9*   WBC 15.1* 16.6* 11.2* 9.5   RBC 2.93* 2.88* 2.68* 2.68*   HCT  27.6* 26.4* 24.3* 23.5*   MCV 94 92 91 88   MCH 30.7 30.2 30.2 29.5   MCHC 32.6 33.0 33.3 33.6   RDW 18.7* 18.6* 18.2* 17.7*   * 108* 70* 55*     INR  Recent Labs   Lab 03/09/24  0405 03/08/24  0358 03/07/24  0348 03/06/24  2211 03/06/24  1603 03/06/24  1233 03/06/24  0816 03/05/24  2338 03/05/24  1609 03/05/24  1519   INR 1.25* 1.32* 1.28* 1.29* 1.41*   < > 1.57*   < > 2.12* 2.12*   PTT  --   --   --   --  27  --  30  --  87* 89*    < > = values in this interval not displayed.     ABG  Recent Labs   Lab 03/07/24  0348 03/06/24  1808 03/06/24  1235 03/06/24  0501   PH 7.45 7.46* 7.45 7.48*   PCO2 37 37 37 35   PO2 103 76* 136* 66*   HCO3 25 26 26 26   O2PER 50 60 60 50      Urine Studies  Recent Labs   Lab Test 03/04/24  1042 02/23/24  1558 02/15/24  2015 11/09/23  1147   COLOR Yellow Yellow Dark Yellow* Yellow   APPEARANCE Clear Clear Clear Clear   URINEGLC 500* Negative Negative >=1000*   URINEBILI Negative Small* Small* Small*   URINEKETONE Negative Negative Negative Negative   SG 1.007 1.009 1.014 <=1.005   UBLD Negative Negative Negative Negative   URINEPH 5.0 5.0 5.0 6.0   PROTEIN Negative Negative Negative Negative   UROBILINOGEN  --   --   --  2.0*   NITRITE Negative Negative Negative Negative   LEUKEST Negative Negative Negative Negative   RBCU <1 <1 <1 0-2   WBCU <1 <1 <1 0-5     No lab results found.  PTH  No lab results found.  Iron Studies  Recent Labs   Lab Test 12/19/23  0758 08/23/23  1018 08/09/23  1122 07/15/23  1048   IRON 135 120 118 92   KE 2,948* 4,261* 4,611*  --        IMAGING:  All imaging studies reviewed by me.

## 2024-03-09 NOTE — PLAN OF CARE
Major Shift Events:  Patient alert, fidgety, Oriented x 2-3.  Continues to be confused,  However beginning to use call light appropriately.   Off Precedex, fentanyl yesterday,  Now receiving oxycodone for pain.  Attempted trazodone for sleep but patient has been  Awake most of the night.  Ammonia 27 today.  HR remains tachy, 110s, afebrile, MAPs > 65.    Continues on 2 liters nasal cannula.  Removed 3500 ml via CRRT yesterday.  Circuit clotted and restarted at 2200 last night.  Pulling 150-200 ml/hr per order.    Bumex last evening produced 550 ml urine over 2 hours, now back to 30-40/hr.  TFs at goal.  2 BMs overnight.  Off and on insulin gtt.  Prednisone tapered per order.  TIMOTHY very positional, intermittently dumping larger amounts with turns.    Fibrinogen 175,  resident called and is aware.  Plan:  Probable HD attempt soon per nephrology.  Increase activity as able. Sliding scale?  For vital signs and complete assessments, please see documentation flowsheets. .

## 2024-03-10 ENCOUNTER — APPOINTMENT (OUTPATIENT)
Dept: PHYSICAL THERAPY | Facility: CLINIC | Age: 54
DRG: 005 | End: 2024-03-10
Attending: NURSE PRACTITIONER
Payer: COMMERCIAL

## 2024-03-10 ENCOUNTER — APPOINTMENT (OUTPATIENT)
Dept: OCCUPATIONAL THERAPY | Facility: CLINIC | Age: 54
DRG: 005 | End: 2024-03-10
Attending: NURSE PRACTITIONER
Payer: COMMERCIAL

## 2024-03-10 LAB
ACANTHOCYTES BLD QL SMEAR: NORMAL
ALBUMIN SERPL BCG-MCNC: 2.7 G/DL (ref 3.5–5.2)
ALP SERPL-CCNC: 76 U/L (ref 40–150)
ALT SERPL W P-5'-P-CCNC: 66 U/L (ref 0–70)
ANION GAP SERPL CALCULATED.3IONS-SCNC: 6 MMOL/L (ref 7–15)
AST SERPL W P-5'-P-CCNC: 27 U/L (ref 0–45)
AUER BODIES BLD QL SMEAR: NORMAL
BACTERIA PRT CULT: NO GROWTH
BASO STIPL BLD QL SMEAR: NORMAL
BASOPHILS # BLD AUTO: 0 10E3/UL (ref 0–0.2)
BASOPHILS NFR BLD AUTO: 0 %
BILIRUB DIRECT SERPL-MCNC: 0.88 MG/DL (ref 0–0.3)
BILIRUB SERPL-MCNC: 1.4 MG/DL
BITE CELLS BLD QL SMEAR: NORMAL
BLISTER CELLS BLD QL SMEAR: NORMAL
BUN SERPL-MCNC: 30.3 MG/DL (ref 6–20)
BURR CELLS BLD QL SMEAR: NORMAL
CALCIUM SERPL-MCNC: 8.4 MG/DL (ref 8.6–10)
CHLORIDE SERPL-SCNC: 103 MMOL/L (ref 98–107)
CREAT SERPL-MCNC: 0.91 MG/DL (ref 0.67–1.17)
DACRYOCYTES BLD QL SMEAR: NORMAL
DEPRECATED HCO3 PLAS-SCNC: 27 MMOL/L (ref 22–29)
EGFRCR SERPLBLD CKD-EPI 2021: >90 ML/MIN/1.73M2
ELLIPTOCYTES BLD QL SMEAR: NORMAL
EOSINOPHIL # BLD AUTO: 0.2 10E3/UL (ref 0–0.7)
EOSINOPHIL NFR BLD AUTO: 3 %
ERYTHROCYTE [DISTWIDTH] IN BLOOD BY AUTOMATED COUNT: 18.7 % (ref 10–15)
FRAGMENTS BLD QL SMEAR: NORMAL
GLUCOSE BLDC GLUCOMTR-MCNC: 101 MG/DL (ref 70–99)
GLUCOSE BLDC GLUCOMTR-MCNC: 115 MG/DL (ref 70–99)
GLUCOSE BLDC GLUCOMTR-MCNC: 117 MG/DL (ref 70–99)
GLUCOSE BLDC GLUCOMTR-MCNC: 120 MG/DL (ref 70–99)
GLUCOSE BLDC GLUCOMTR-MCNC: 123 MG/DL (ref 70–99)
GLUCOSE BLDC GLUCOMTR-MCNC: 125 MG/DL (ref 70–99)
GLUCOSE BLDC GLUCOMTR-MCNC: 128 MG/DL (ref 70–99)
GLUCOSE BLDC GLUCOMTR-MCNC: 133 MG/DL (ref 70–99)
GLUCOSE BLDC GLUCOMTR-MCNC: 133 MG/DL (ref 70–99)
GLUCOSE BLDC GLUCOMTR-MCNC: 134 MG/DL (ref 70–99)
GLUCOSE BLDC GLUCOMTR-MCNC: 141 MG/DL (ref 70–99)
GLUCOSE BLDC GLUCOMTR-MCNC: 141 MG/DL (ref 70–99)
GLUCOSE BLDC GLUCOMTR-MCNC: 142 MG/DL (ref 70–99)
GLUCOSE BLDC GLUCOMTR-MCNC: 146 MG/DL (ref 70–99)
GLUCOSE BLDC GLUCOMTR-MCNC: 149 MG/DL (ref 70–99)
GLUCOSE BLDC GLUCOMTR-MCNC: 150 MG/DL (ref 70–99)
GLUCOSE BLDC GLUCOMTR-MCNC: 160 MG/DL (ref 70–99)
GLUCOSE BLDC GLUCOMTR-MCNC: 163 MG/DL (ref 70–99)
GLUCOSE BLDC GLUCOMTR-MCNC: 171 MG/DL (ref 70–99)
GLUCOSE BLDC GLUCOMTR-MCNC: 171 MG/DL (ref 70–99)
GLUCOSE BLDC GLUCOMTR-MCNC: 176 MG/DL (ref 70–99)
GLUCOSE BLDC GLUCOMTR-MCNC: 189 MG/DL (ref 70–99)
GLUCOSE BLDC GLUCOMTR-MCNC: 201 MG/DL (ref 70–99)
GLUCOSE SERPL-MCNC: 176 MG/DL (ref 70–99)
HCT VFR BLD AUTO: 25.7 % (ref 40–53)
HGB BLD-MCNC: 8.4 G/DL (ref 13.3–17.7)
HGB C CRYSTALS: NORMAL
HOWELL-JOLLY BOD BLD QL SMEAR: NORMAL
IMM GRANULOCYTES # BLD: 0 10E3/UL
IMM GRANULOCYTES NFR BLD: 0 %
INR PPP: 1.3 (ref 0.85–1.15)
LYMPHOCYTES # BLD AUTO: 0.4 10E3/UL (ref 0.8–5.3)
LYMPHOCYTES NFR BLD AUTO: 5 %
MAGNESIUM SERPL-MCNC: 1.8 MG/DL (ref 1.7–2.3)
MCH RBC QN AUTO: 30.9 PG (ref 26.5–33)
MCHC RBC AUTO-ENTMCNC: 32.7 G/DL (ref 31.5–36.5)
MCV RBC AUTO: 95 FL (ref 78–100)
MONOCYTES # BLD AUTO: 0.6 10E3/UL (ref 0–1.3)
MONOCYTES NFR BLD AUTO: 9 %
NEUTROPHILS # BLD AUTO: 6 10E3/UL (ref 1.6–8.3)
NEUTROPHILS NFR BLD AUTO: 83 %
NEUTS HYPERSEG BLD QL SMEAR: NORMAL
NRBC # BLD AUTO: 0 10E3/UL
NRBC BLD AUTO-RTO: 0 /100
PHOSPHATE SERPL-MCNC: 2.8 MG/DL (ref 2.5–4.5)
PLAT MORPH BLD: NORMAL
PLATELET # BLD AUTO: 64 10E3/UL (ref 150–450)
POLYCHROMASIA BLD QL SMEAR: NORMAL
POTASSIUM SERPL-SCNC: 3.2 MMOL/L (ref 3.4–5.3)
PROT SERPL-MCNC: 4.4 G/DL (ref 6.4–8.3)
RBC # BLD AUTO: 2.72 10E6/UL (ref 4.4–5.9)
RBC AGGLUT BLD QL: NORMAL
RBC MORPH BLD: NORMAL
ROULEAUX BLD QL SMEAR: NORMAL
SICKLE CELLS BLD QL SMEAR: NORMAL
SMUDGE CELLS BLD QL SMEAR: NORMAL
SODIUM SERPL-SCNC: 136 MMOL/L (ref 135–145)
SPHEROCYTES BLD QL SMEAR: NORMAL
STOMATOCYTES BLD QL SMEAR: NORMAL
TACROLIMUS BLD-MCNC: 2.6 UG/L (ref 5–15)
TARGETS BLD QL SMEAR: NORMAL
TME LAST DOSE: ABNORMAL H
TME LAST DOSE: ABNORMAL H
TOXIC GRANULES BLD QL SMEAR: NORMAL
VARIANT LYMPHS BLD QL SMEAR: NORMAL
WBC # BLD AUTO: 7.2 10E3/UL (ref 4–11)

## 2024-03-10 PROCEDURE — 250N000012 HC RX MED GY IP 250 OP 636 PS 637: Performed by: NURSE PRACTITIONER

## 2024-03-10 PROCEDURE — 250N000011 HC RX IP 250 OP 636: Performed by: INTERNAL MEDICINE

## 2024-03-10 PROCEDURE — 120N000011 HC R&B TRANSPLANT UMMC

## 2024-03-10 PROCEDURE — 250N000013 HC RX MED GY IP 250 OP 250 PS 637: Performed by: NURSE PRACTITIONER

## 2024-03-10 PROCEDURE — 36592 COLLECT BLOOD FROM PICC: CPT | Performed by: NURSE PRACTITIONER

## 2024-03-10 PROCEDURE — 84100 ASSAY OF PHOSPHORUS: CPT | Performed by: NURSE PRACTITIONER

## 2024-03-10 PROCEDURE — 85025 COMPLETE CBC W/AUTO DIFF WBC: CPT | Performed by: NURSE PRACTITIONER

## 2024-03-10 PROCEDURE — 250N000011 HC RX IP 250 OP 636: Mod: JZ | Performed by: NURSE PRACTITIONER

## 2024-03-10 PROCEDURE — 85610 PROTHROMBIN TIME: CPT | Performed by: NURSE PRACTITIONER

## 2024-03-10 PROCEDURE — 97140 MANUAL THERAPY 1/> REGIONS: CPT | Mod: GO

## 2024-03-10 PROCEDURE — 99233 SBSQ HOSP IP/OBS HIGH 50: CPT | Mod: 24 | Performed by: INTERNAL MEDICINE

## 2024-03-10 PROCEDURE — 80197 ASSAY OF TACROLIMUS: CPT | Performed by: NURSE PRACTITIONER

## 2024-03-10 PROCEDURE — 97530 THERAPEUTIC ACTIVITIES: CPT | Mod: GP

## 2024-03-10 PROCEDURE — 258N000003 HC RX IP 258 OP 636: Performed by: NURSE PRACTITIONER

## 2024-03-10 PROCEDURE — 80076 HEPATIC FUNCTION PANEL: CPT | Performed by: NURSE PRACTITIONER

## 2024-03-10 PROCEDURE — 250N000009 HC RX 250: Performed by: NURSE PRACTITIONER

## 2024-03-10 PROCEDURE — 83735 ASSAY OF MAGNESIUM: CPT | Performed by: NURSE PRACTITIONER

## 2024-03-10 PROCEDURE — 97116 GAIT TRAINING THERAPY: CPT | Mod: GP

## 2024-03-10 PROCEDURE — 97161 PT EVAL LOW COMPLEX 20 MIN: CPT | Mod: GP

## 2024-03-10 PROCEDURE — 97535 SELF CARE MNGMENT TRAINING: CPT | Mod: GO

## 2024-03-10 PROCEDURE — 250N000011 HC RX IP 250 OP 636: Performed by: NURSE PRACTITIONER

## 2024-03-10 RX ORDER — BUMETANIDE 0.25 MG/ML
2 INJECTION INTRAMUSCULAR; INTRAVENOUS
Status: DISCONTINUED | OUTPATIENT
Start: 2024-03-10 | End: 2024-03-13

## 2024-03-10 RX ORDER — POTASSIUM CHLORIDE 750 MG/1
20 TABLET, EXTENDED RELEASE ORAL ONCE
Status: COMPLETED | OUTPATIENT
Start: 2024-03-10 | End: 2024-03-10

## 2024-03-10 RX ORDER — TACROLIMUS 5 MG/1
5 CAPSULE ORAL
Status: DISCONTINUED | OUTPATIENT
Start: 2024-03-10 | End: 2024-03-13

## 2024-03-10 RX ORDER — SIMETHICONE 80 MG
80 TABLET,CHEWABLE ORAL
Status: COMPLETED | OUTPATIENT
Start: 2024-03-10 | End: 2024-03-11

## 2024-03-10 RX ADMIN — TACROLIMUS 4 MG: 1 CAPSULE ORAL at 07:40

## 2024-03-10 RX ADMIN — MYCOPHENOLATE MOFETIL 750 MG: 250 CAPSULE ORAL at 18:02

## 2024-03-10 RX ADMIN — URSODIOL 300 MG: 300 CAPSULE ORAL at 20:13

## 2024-03-10 RX ADMIN — Medication 15 ML: at 11:52

## 2024-03-10 RX ADMIN — BUMETANIDE 2 MG: 0.25 INJECTION INTRAMUSCULAR; INTRAVENOUS at 00:21

## 2024-03-10 RX ADMIN — URSODIOL 300 MG: 300 CAPSULE ORAL at 07:40

## 2024-03-10 RX ADMIN — PREDNISONE 10 MG: 10 TABLET ORAL at 07:41

## 2024-03-10 RX ADMIN — Medication 40 MG: at 10:05

## 2024-03-10 RX ADMIN — POLYETHYLENE GLYCOL 3350 17 G: 17 POWDER, FOR SOLUTION ORAL at 07:40

## 2024-03-10 RX ADMIN — MICAFUNGIN SODIUM 100 MG: 50 INJECTION, POWDER, LYOPHILIZED, FOR SOLUTION INTRAVENOUS at 11:02

## 2024-03-10 RX ADMIN — OXYCODONE HYDROCHLORIDE 10 MG: 10 TABLET ORAL at 15:08

## 2024-03-10 RX ADMIN — PIPERACILLIN AND TAZOBACTAM 3.38 G: 3; .375 INJECTION, POWDER, FOR SOLUTION INTRAVENOUS at 10:05

## 2024-03-10 RX ADMIN — POTASSIUM CHLORIDE 20 MEQ: 750 TABLET, EXTENDED RELEASE ORAL at 10:08

## 2024-03-10 RX ADMIN — Medication 1 PACKET: at 16:22

## 2024-03-10 RX ADMIN — Medication 1 PACKET: at 20:03

## 2024-03-10 RX ADMIN — BUMETANIDE 2 MG: 0.25 INJECTION INTRAMUSCULAR; INTRAVENOUS at 10:08

## 2024-03-10 RX ADMIN — VANCOMYCIN HYDROCHLORIDE 1250 MG: 10 INJECTION, POWDER, LYOPHILIZED, FOR SOLUTION INTRAVENOUS at 08:21

## 2024-03-10 RX ADMIN — PIPERACILLIN AND TAZOBACTAM 3.38 G: 3; .375 INJECTION, POWDER, FOR SOLUTION INTRAVENOUS at 04:06

## 2024-03-10 RX ADMIN — Medication 1 PACKET: at 11:52

## 2024-03-10 RX ADMIN — INSULIN HUMAN 6 UNITS/HR: 1 INJECTION, SOLUTION INTRAVENOUS at 06:09

## 2024-03-10 RX ADMIN — BUMETANIDE 2 MG: 0.25 INJECTION INTRAMUSCULAR; INTRAVENOUS at 16:22

## 2024-03-10 RX ADMIN — SULFAMETHOXAZOLE AND TRIMETHOPRIM 1 TABLET: 400; 80 TABLET ORAL at 20:13

## 2024-03-10 RX ADMIN — VALGANCICLOVIR 450 MG: 450 TABLET, FILM COATED ORAL at 20:13

## 2024-03-10 RX ADMIN — OXYCODONE HYDROCHLORIDE 10 MG: 10 TABLET ORAL at 08:21

## 2024-03-10 RX ADMIN — TACROLIMUS 5 MG: 5 CAPSULE ORAL at 18:02

## 2024-03-10 RX ADMIN — SENNOSIDES AND DOCUSATE SODIUM 1 TABLET: 8.6; 5 TABLET ORAL at 07:41

## 2024-03-10 RX ADMIN — Medication 1 PACKET: at 07:51

## 2024-03-10 RX ADMIN — THIAMINE HCL TAB 100 MG 100 MG: 100 TAB at 07:41

## 2024-03-10 RX ADMIN — MYCOPHENOLATE MOFETIL 750 MG: 250 CAPSULE ORAL at 07:41

## 2024-03-10 RX ADMIN — OXYCODONE HYDROCHLORIDE 10 MG: 10 TABLET ORAL at 22:22

## 2024-03-10 ASSESSMENT — ACTIVITIES OF DAILY LIVING (ADL)
ADLS_ACUITY_SCORE: 36
ADLS_ACUITY_SCORE: 35
ADLS_ACUITY_SCORE: 36
ADLS_ACUITY_SCORE: 35
ADLS_ACUITY_SCORE: 36
ADLS_ACUITY_SCORE: 35
ADLS_ACUITY_SCORE: 37
ADLS_ACUITY_SCORE: 35
ADLS_ACUITY_SCORE: 35
ADLS_ACUITY_SCORE: 36
ADLS_ACUITY_SCORE: 37
ADLS_ACUITY_SCORE: 36
ADLS_ACUITY_SCORE: 35
ADLS_ACUITY_SCORE: 35
ADLS_ACUITY_SCORE: 36

## 2024-03-10 NOTE — PROGRESS NOTES
03/10/24 1200   Appointment Info   Signing Clinician's Name / Credentials (PT) Radha Almanzar, PT, DPT   Rehab Comments (PT) Abdominal precautions   Living Environment   People in Home spouse   Current Living Arrangements house   Home Accessibility stairs within home   Number of Stairs, Within Home, Primary seven   Stair Railings, Within Home, Primary railings safe and in good condition   Transportation Anticipated family or friend will provide   Living Environment Comments Pt lives in house with spouse, split entry house.   Self-Care   Usual Activity Tolerance good   Current Activity Tolerance fair   Regular Exercise No   Equipment Currently Used at Home none   Fall history within last six months yes   Number of times patient has fallen within last six months 1   Activity/Exercise/Self-Care Comment Per wife she was assisting more with ADL's recently prior to admission due to cognition.  Usually moves around without an AD.   General Information   Onset of Illness/Injury or Date of Surgery 24   Patient/Family Therapy Goals Statement (PT) Pt wants to go home   Pertinent History of Current Problem (include personal factors and/or comorbidities that impact the POC) 53 year old male with a history of EtOH cirrhosis, complicated by encephalopathy, hyponatremia, chronic thrombocytopenia, macrocytic anemia, with hospitalization (2024) for encephalopathy and suspected SBP, and recent admission -3/2 for Strep bovis bacteremia. He is now s/p a  donor liver transplant on 3/5/24 with biliary stent placement with Dr. Cortez. Pre-op MELD 31.   Existing Precautions/Restrictions fall;abdominal   Weight-Bearing Status - LUE full weight-bearing   Weight-Bearing Status - RUE full weight-bearing   Weight-Bearing Status - LLE full weight-bearing   Weight-Bearing Status - RLE full weight-bearing   General Observations Activity: up with assist   Cognition   Affect/Mental Status (Cognition) confused    Orientation Status (Cognition) oriented x 3   Follows Commands (Cognition) follows one-step commands;delayed response/completion   Cognitive Status Comments Slow to respond to commands, easily gets off track   Integumentary/Edema   Integumentary/Edema Comments B LE edema, pitting   Posture    Posture Protracted shoulders;Forward head position   Range of Motion (ROM)   Range of Motion ROM is WFL   ROM Comment Limited slightly in B LE's due to swelling   Strength (Manual Muscle Testing)   Strength Comments Generalized weakness in B LE's, grossly 3-4/5   Bed Mobility   Comment, (Bed Mobility) ModA x 1, cues for sequencing   Transfers   Comment, (Transfers) Min-modA x 1 with walker   Gait/Stairs (Locomotion)   Comment, (Gait/Stairs) CGA-mAee with walker   Balance   Balance Comments SBA for sitting balance, Amee without UE support for standing balance   Sensory Examination   Sensory Perception patient reports no sensory changes   Coordination   Coordination no deficits were identified   Muscle Tone   Muscle Tone no deficits were identified   Clinical Impression   Criteria for Skilled Therapeutic Intervention Yes, treatment indicated   PT Diagnosis (PT) Impaired functional mobility   Influenced by the following impairments Decreased strength, balance and activity tolerance   Functional limitations due to impairments Inability to complete functional mobility at baseline level of functioning   Clinical Presentation (PT Evaluation Complexity) stable   Clinical Presentation Rationale Clinical judgement, on RA   Clinical Decision Making (Complexity) low complexity   Planned Therapy Interventions (PT) balance training;bed mobility training;gait training;home exercise program;patient/family education;postural re-education;stair training;strengthening;transfer training;progressive activity/exercise;risk factor education;home program guidelines   Risk & Benefits of therapy have been explained evaluation/treatment results  reviewed;care plan/treatment goals reviewed;risks/benefits reviewed;current/potential barriers reviewed;participants voiced agreement with care plan;participants included;patient;spouse/significant other   Clinical Impression Comments Pt would benefit from IP PT to progress strength and IND with functional mobility within precautions.   PT Total Evaluation Time   PT Eval, Low Complexity Minutes (62464) 10   Physical Therapy Goals   PT Frequency 6x/week   PT Predicted Duration/Target Date for Goal Attainment 03/24/24   PT Goals Bed Mobility;Transfers;Gait;Stairs   PT: Bed Mobility Independent;Within precautions   PT: Transfers Modified independent;Within precautions   PT: Gait Supervision/stand-by assist;Greater than 200 feet   PT: Stairs Supervision/stand-by assist;7 stairs   Interventions   Interventions Quick Adds Therapeutic Activity;Gait Training   Therapeutic Activity   Therapeutic Activities: dynamic activities to improve functional performance Minutes (94563) 25   Treatment Detail/Skilled Intervention PT: Focus on progression with functional mobility within precautions.  Pt on RA through out session with VSS, extra time to prep lines for mobility.  Pt slightly confused and low arousal to start session, extra time to respond to commands and one step direction needed.  Pt modA for supine>sit with log roll, once sitting able to help scoot hips to EOB.  Pt Amee with use of walker for sit<>stand, cues for proper hand placement and push through LE's.  Pt ambulated in/out of bathroom with walker and CGA, cues for safety with walker with turns and keeping it with him.  Pt more sleepy at end of session, A x 2 for getting LE's back into bed and repositioned.  Educated pt and his wife on d/c planning and need for possible rehab, discussion about ARU and wife open to this.  Pt left supine in bed all needs met.   Gait Training   Gait Training Minutes (04179) 12   Symptoms Noted During/After Treatment (Gait Training)  fatigue   Treatment Detail/Skilled Intervention PT: Focus on IND with ambulation.  Wife present to provide w/c follow for safety.  Pt ambulated 130' with use of walker and CGA, needing cues for upright posture and keeping closer proximity to the walker, easily distracted.  Pt initially taking break at w/c a tdoor of room thinking he wanted to walk more but then stating he wanted to be done.   PT Discharge Planning   PT Plan PT: IND with bed mob and transfers, safety with walker, ambulation with w/c follow   PT Discharge Recommendation (DC Rec) [facility]-Motivated patient will benefit from intensive, interdisciplinary therapy.  Anticipate will be able to tolerate 3 hours of therapy per day   PT Rationale for DC Rec Pt below baseline for mobility and has great support at home, was previosly IND, would benefit from intensive thearpy.   PT Brief overview of current status A x 1 with walker, encouage being up, modA for bed mob   Total Session Time   Timed Code Treatment Minutes 37   Total Session Time (sum of timed and untimed services) 47

## 2024-03-10 NOTE — PROGRESS NOTES
Transplant Surgery  Inpatient Daily Progress Note  03/10/2024    Assessment & Plan: Mary Lopez is a 53 year old male with a history of EtOH cirrhosis, complicated by encephalopathy, hyponatremia, chronic thrombocytopenia, macrocytic anemia, with hospitalization (2024) for encephalopathy and suspected SBP, and recent admission -3/2 for Strep bovis bacteremia. He is now s/p a  donor liver transplant on 3/5/24 with biliary stent placement with Dr. Cortez. Pre-op MELD 31.    Renal function improving.   PT/OT.   Diurese.      DDLTx with stent: POD 5   - LFTs downtrending except mild TB elevation, 1.7 (1.3).  Jose Roberto BID. ASA 81mg daily  -Plan for HIDA 3/11 due to bile duct size mismatch (donor 5 mm, recipient 10 mm)   -Postop Liver US: Normal liver, patent doppler. Hematoma inferior to the right lobe   Immunosuppression management: Induction with Solu-Medrol/pred taper  Maintenance:  MMF: 750 mg BID  Tac 4 mg BID, goal 6-8 renal spairing  Pred 5 while tac <8  Neurology:   Acute postoperative pain:  Oxycodone, Dilaudid PRN  Hematology: Acute blood loss anemia. Received 10 units pRBCs post OR, last transfused 3/6.  Hgb stable 8-9  Thrombocytopenia: due to liver disease, Plts uptrending, ~100  Coagulopathy: INR 1.32, fibrinogen >200  Cardiorespiratory: Acute respiratory failure: Extubated in the OR, re-intubated shortly after transfer to the ICU. Extubated 3/8.  Maintaining 2L NC. Pulm toilet, CDB/IS.  Bilateral pleural effusions: L pleural effusion stable, R pleural effusion slightly improved.  Circulatory failure requiring pressor support: Resolved, off pressors  GI/Nutrition: Diet: CLD +Renal EN, increase to Regular diet as sharon  Diarrhea: change bowel regimen to PRN  Endocrine: Type 2 diabetes mellitus with steroid hyperglycemia: on insulin drip   Fluid/Electrolytes:   Electrolytes per ICU  Renal: ANGEL, oliguric. Nephrology consulted, CRRT initiated 3/7 -stop. Responsive to  diuresis.  Hypervolemia: Initially with CRRT for volume removal. Diuresed with lasix per ICU.  Bumex TID per neph. Stop CRRT.  : Moore to remain due to critical status/diuresis. Likely remove tomorrow.  Infectious disease: Continue vanc/zosyn  H/o S bovis bacteremia:   Prophylaxis: DVT, fall, GI(PPI), fungal (diflucan-change to Micafungin d/t CRRT )  Zosyn, Vanco  Disposition: SICU-transfer to , PT/OT    Medical Decision Making: High  Subsequent visit 87881 (high level decision making)    JOSELYN/Fellow/Resident Provider: Isatu Perla NP      Faculty: Ryder Cortez M.D.      Medical Decision Making: High  Subsequent visit [id number] (high level decision making)    __________________________________________________________________  Transplant History: Admitted 3/4/2024 for  donor liver transplant.   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    3/5/2024 (Liver), Postoperative day: 5     Interval History: History is obtained from the electronic health record and patient     Overnight events: extubated to 2L , tolerating EN/CLD. Reporting some loose stool. Pain 8/10.    ROS:   A 10-point review of systems was negative except as noted above.    Curent Meds:   [Held by provider] aspirin  325 mg Oral or Feeding Tube Daily    bumetanide  2 mg Intravenous BID    micafungin  100 mg Intravenous Daily    multivitamins w/minerals  15 mL Per Feeding Tube Daily    mycophenolate  750 mg Oral BID IS    Or    mycophenolate  750 mg Oral or NG Tube BID IS    pantoprazole  40 mg Per Feeding Tube QAM AC    polyethylene glycol  17 g Oral or Feeding Tube Daily    [START ON 3/11/2024] predniSONE  5 mg Oral Daily    protein modular  1 packet Per Feeding Tube 4x Daily    senna-docusate  1 tablet Oral or Feeding Tube BID    sodium chloride (PF)  3 mL Intravenous Q8H    sulfamethoxazole-trimethoprim  1 tablet Oral or Feeding Tube QPM    tacrolimus  4 mg Oral BID IS    thiamine  100 mg Oral or Feeding Tube  "Daily    ursodiol  300 mg Oral BID    Or    ursodiol  300 mg Oral or NG Tube BID    valGANciclovir  450 mg Oral QPM    Or    valGANciclovir  450 mg Oral or NG Tube QPM       Physical Exam:     Admit Weight: 107.2 kg (236 lb 4.8 oz)    Current Vitals:   /80 (BP Location: Right arm, Cuff Size: Adult Regular)   Pulse 98   Temp 97.8  F (36.6  C) (Oral)   Resp 20   Ht 1.73 m (5' 8.11\")   Wt 104.8 kg (231 lb 1.6 oz)   SpO2 91%   BMI 35.03 kg/m      CVP (mmHg): 27 mmHg    Vital sign ranges:    Temp:  [97.8  F (36.6  C)-98.6  F (37  C)] 97.8  F (36.6  C)  Pulse:  [] 98  Resp:  [11-20] 20  BP: (110-127)/() 125/80  SpO2:  [88 %-100 %] 91 %  Patient Vitals for the past 24 hrs:   BP Temp Temp src Pulse Resp SpO2 Weight   03/10/24 1100 125/80 -- -- 98 20 91 % --   03/10/24 0900 -- -- -- -- -- -- 104.8 kg (231 lb 1.6 oz)   03/10/24 0510 (!) 113/96 -- -- -- -- 96 % --   03/10/24 0508 (!) 118/101 97.8  F (36.6  C) Oral 105 20 -- --   03/10/24 0118 123/80 98.6  F (37  C) Oral 99 20 95 % --   03/09/24 2218 -- -- -- -- -- 91 % --   03/09/24 2215 127/85 98.5  F (36.9  C) Oral 105 20 (!) 88 % --   03/09/24 1600 -- 98.5  F (36.9  C) Oral -- -- -- --   03/09/24 1500 124/79 -- -- 102 18 92 % --   03/09/24 1400 114/73 -- -- 101 11 91 % --   03/09/24 1300 113/76 98.6  F (37  C) -- 103 12 98 % --   03/09/24 1200 110/71 98.6  F (37  C) Bladder 98 12 100 % --     General Appearance: NAD  Skin: normal, warm  Heart: regular rate and rhythm  LungsNLB on 2L NC  Abdomen: Soft, appropriately tender, ND,  Incision CDI., drain sso  : Moore catheter in place clear yellow output  Extremities: edema: bilaterally, +2 pitting  Neurologic: Awake, AO x3,     Frailty Scores          12/19/2023   Frailty Scores   Final Score Frail   Final Score Number 4       Data:   CMP  Recent Labs   Lab 03/10/24  1059 03/10/24  1000 03/10/24  0658 03/10/24  0656 03/09/24  0804 03/09/24  0801 03/09/24  0457 03/09/24  0405 03/08/24  2224 " 03/08/24  1943 03/06/24  0503 03/06/24  0500 03/04/24  1257 03/04/24  1058   NA  --   --   --  136  --  139  --  137  --  138   < > 136   < > 129*   POTASSIUM  --   --   --  3.2*  --  3.6  --  4.1  --  4.2   < > 4.0   < > 4.3   CHLORIDE  --   --   --  103  --  107  --  104  --  105   < > 104   < > 96*   CO2  --   --   --  27  --  25  --  25  --  26   < > 25   < > 25   * 150*   < > 176*   < > 188*   < > 222*   < > 153*   < > 154*   < > 392*   BUN  --   --   --  30.3*  --  29.4*  --  30.0*  --  31.7*   < > 23.6*   < > 17.5   CR  --   --   --  0.91  --  1.06  --  1.06  --  1.24*   < > 1.11   < > 1.08   GFRESTIMATED  --   --   --  >90  --  84  --  84  --  70   < > 80   < > 83   MEG  --   --   --  8.4*  --  7.8*  --  7.9*  --  7.8*   < > 8.8   < > 8.7   ICAW  --   --   --   --   --   --   --  4.6  --  4.7   < > 5.2   < >  --    MAG  --   --   --  1.8  --   --   --  2.2  --  2.3   < > 1.6*  --  1.1*   PHOS  --   --   --  2.8  --   --   --  4.4  --  4.8*   < > 3.8  --  2.3*   AMYLASE  --   --   --   --   --   --   --   --   --   --   --  22*  --  10*   LIPASE  --   --   --   --   --   --   --   --   --   --   --  5*  --   --    ALBUMIN  --   --   --  2.7*  --   --   --  3.1*  --  3.2*   < > 2.3*   < > 2.6*   BILITOTAL  --   --   --  1.4*  --   --   --  1.7*  --   --    < > 2.6*   < > 12.7*   ALKPHOS  --   --   --  76  --   --   --  79  --   --    < > 47   < > 181*   AST  --   --   --  27  --   --   --  43  --   --    < > 310*   < >  --    ALT  --   --   --  66  --   --   --  103*  --   --    < > 294*   < > 50    < > = values in this interval not displayed.     CBC  Recent Labs   Lab 03/10/24  0656 03/09/24  0405   HGB 8.4* 9.0*   WBC 7.2 15.1*   PLT 64* 106*     COAGS  Recent Labs   Lab 03/10/24  0656 03/09/24  0405 03/06/24  2211 03/06/24  1603 03/06/24  1233 03/06/24  0816   INR 1.30* 1.25*   < > 1.41*   < > 1.57*   PTT  --   --   --  27  --  30    < > = values in this interval not displayed.       Urinalysis  Recent Labs   Lab Test 03/04/24  1042 02/23/24  1558   COLOR Yellow Yellow   APPEARANCE Clear Clear   URINEGLC 500* Negative   URINEBILI Negative Small*   URINEKETONE Negative Negative   SG 1.007 1.009   UBLD Negative Negative   URINEPH 5.0 5.0   PROTEIN Negative Negative   NITRITE Negative Negative   LEUKEST Negative Negative   RBCU <1 <1   WBCU <1 <1     Virology:  Hepatitis C Antibody   Date Value Ref Range Status   03/04/2024 Nonreactive Nonreactive Final     Comment:     A nonreactive screening test result does not exclude the possibility of exposure to or infection with HCV. Nonreactive screening test results in individuals with prior exposure to HCV may be due to antibody levels below the limit of detection of this assay or lack of reactivity to the HCV antigens used in this assay. Patients with recent HCV infections (<3 months from time of exposure) may have false-negative HCV antibody results due to the time needed for seroconversion (average of 8 to 9 weeks).

## 2024-03-10 NOTE — PLAN OF CARE
"Goal Outcome Evaluation:  BP (!) 113/96   Pulse 105   Temp 97.8  F (36.6  C) (Oral)   Resp 20   Ht 1.73 m (5' 8.11\")   Wt 104.8 kg (231 lb 1.6 oz)   SpO2 96%   BMI 35.03 kg/m      A&Ox2, pt confused and disoriented to time and situation. Continues to endorse the presence of auditory hallucinations. Denies nausea. PRN oxy given for reports of abdominal pain. VSS on RA. Intermittently incontinent of bowel and bladder. Voiding via bedside urinal, LBM today. Clamshell incision CDI and RON. Continued on regular diet with TF running at 40ml/hr. BG checked q1hr for insulin gtt. Assist x2 when oob, worked with therapy today. Bed alarm on for safety. Call light within reach, continue with plan of care.                       "

## 2024-03-10 NOTE — PROGRESS NOTES
[facility]   Transplant Nephrology Progress Note  Date of Admission:  3/4/2024  Today's Date: 03/10/2024    Recommendations:  -Decrease bumex to 2mg IV BID today  -If Scr same or lower tmrw would remove dialysis line  -No acute indication for dialysis  -Replete K    Assessment & Plan   # ANGEL: Trend down in serum creatinine after stopping CRRT on 3/9   - ANGEL felt secondary to volume shifts with liver transplant.   - Baseline Creatinine: ~ 0.7-0.9   - Proteinuria: Normal (<0.2 grams) 12/2023   -Stop CRRT. Start bumex 2mg IV q8h.    -Dialysis access: RIJ temp line. Keep in place for now. 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  Transaminases Trend down.  Followed by Transplant Surgery.   - 3/6/24 Transplant Renal Ultrasound:  Hematoma inferior to the right lobe of the liver     # Immunosuppression: Tacrolimus immediate release (goal 6-8), Mycophenolate mofetil (dose 750 mg every 12 hours) and Prednisone (dose taper)   - Induction with Recent Transplant:  Per Liver Tx Protocol   - Continue with intensive monitoring of immunosuppression for efficacy and toxicity.   - Goal tacrolimus level lower due to ANGEL.   - Changes: Management per Transplant Surgery.    # Infection Prophylaxis:   - PJP: Sulfa/TMP (Bactrim)  - CMV: Valganciclovir (Valcyte), CMV IgG Ab negative, follow-up donor CMV status.  - Thrush: Nystatin (Mycostatin) swish and swallow and Fluconazole (Diflucan)  - Fungal: Fluconazole (Diflucan)    # Blood Pressure: Controlled;  Goal BP: < 150/90   - Volume status: Total body volume up, but intravascularly euvolemic     - Changes: Yes - decrease bumex to 2mg IV BID     # Type 2 Diabetes: Controlled (HbA1c <7%) Last HbA1c: 4.8%   - Management as per primary team.  On insulin gtt.    # Anemia in Chronic Disease/Surgery: Hgb: Stable, low      MAITE: No   - Iron studies: Replete  (12/2023)    # Thrombocytopenia: Trend down    # Mineral Bone Disorder:   - Vitamin D; level:  26 (12/2023)           On supplement: No  - Calcium; level: Low normal           On supplement: No  - Phosphorus; level: Normal   On supplement: No    # Electrolytes:   - Potassium; level: Low         On supplement: No, replete K  - Magnesium; level: Normal        On supplement: No  - Bicarbonate; level: Normal         On supplement: No  - Sodium; level: Normal    # Transplant History:  Etiology of Organ Failure: Alcohol-related liver disease  Tx: Liver Tx  Transplant: 3/5/2024 (Liver)  Significant changes in immunosuppression: Slightly lower tacrolimus level goal due to ANGEL.  Significant transplant-related complications: None      Recommendations were communicated to the primary team verbally    Kirill Gray MD   Transplant Nephrology  Contact information available via [facility] Paging/Directory    Interval History   Mr. Lopez's creatinine is 0.91 (03/10 0656); Trend down.  Increasing urine output.  Other significant labs/tests/vitals: Scr decresaing  No events overnight.  No chest pain or shortness of breath.  Improving leg swelling.  No nausea and vomiting.  Bowel movements are normal.  No fever, sweats or chills.      Review of Systems   4 point ROS was obtained and negative except as noted in the Interval History.    MEDICATIONS:    [Held by provider] aspirin  325 mg Oral or Feeding Tube Daily     bumetanide  2 mg Intravenous BID     micafungin  100 mg Intravenous Daily     multivitamins w/minerals  15 mL Per Feeding Tube Daily     mycophenolate  750 mg Oral BID IS    Or     mycophenolate  750 mg Oral or NG Tube BID IS     pantoprazole  40 mg Per Feeding Tube QAM AC     piperacillin-tazobactam  3.375 g Intravenous Q6H     polyethylene glycol  17 g Oral or Feeding Tube Daily     [START ON 3/11/2024] predniSONE  5 mg Oral Daily     protein modular  1 packet Per Feeding Tube 4x Daily     senna-docusate  1 tablet Oral  "or Feeding Tube BID     sodium chloride (PF)  3 mL Intravenous Q8H     sulfamethoxazole-trimethoprim  1 tablet Oral or Feeding Tube QPM     tacrolimus  4 mg Oral BID IS     thiamine  100 mg Oral or Feeding Tube Daily     ursodiol  300 mg Oral BID    Or     ursodiol  300 mg Oral or NG Tube BID     valGANciclovir  450 mg Oral QPM    Or     valGANciclovir  450 mg Oral or NG Tube QPM       dextrose       dextrose       insulin regular 6 Units/hr (03/10/24 1002)     BETA BLOCKER NOT PRESCRIBED         Physical Exam   Temp  Av.1  F (36.7  C)  Min: 96.3  F (35.7  C)  Max: 100.2  F (37.9  C)  Arterial Line BP  Min: 85/53  Max: 141/72  Arterial Line MAP (mmHg)  Av.3 mmHg  Min: 59 mmHg  Max: 103 mmHg      Pulse  Av.6  Min: 50  Max: 129 Resp  Av.5  Min: 11  Max: 25  FiO2 (%)  Av.8 %  Min: 40 %  Max: 60 %  SpO2  Av.9 %  Min: 88 %  Max: 100 %    CVP (mmHg): (S) 17 mmHg (Transplant notified)BP (!) 113/96   Pulse 105   Temp 97.8  F (36.6  C) (Oral)   Resp 20   Ht 1.73 m (5' 8.11\")   Wt 104.8 kg (231 lb 1.6 oz)   SpO2 96%   BMI 35.03 kg/m     Date 24 0700 - 24 0659   Shift [id number] 24 Hour Total   INTAKE   I.V. 52.2   52.2   Enteral 40   40   Shift Total(mL/kg) 92.2(0.8)   92.2(0.8)   OUTPUT   Other 124   124   Shift Total(mL/kg) 124(1.08)   124(1.08)   Weight (kg) 115 115 115 115      Admit Weight: 107.2 kg (236 lb 4.8 oz)     GENERAL APPEARANCE: alert and no distress  HENT: mouth without ulcers or lesions  LYMPHATICS: no cervical or supraclavicular nodes  RESP: lungs clear to auscultation - no rales, rhonchi or wheezes  CV: regular rhythm, normal rate, no rub, no murmur  EDEMA: 2+ LE edema bilaterally  ABDOMEN: soft, nondistended, nontender, bowel sounds normal  MS: extremities normal - no gross deformities noted, no evidence of inflammation in joints, no muscle tenderness  SKIN: no rash  NEURO: normal strength and tone, sensory exam grossly normal, mentation " intact and speech normal  PSYCH: confused  DIALYSIS ACCESS:  Temporary catheter [facility]    Data   All labs reviewed by me.  CMP  Recent Labs   Lab 03/10/24  1000 03/10/24  0906 03/10/24  0754 03/10/24  0658 03/10/24  0656 03/09/24  0804 03/09/24  0801 03/09/24  0457 03/09/24  0405 03/08/24  2224 03/08/24  1943 03/08/24  1142 03/08/24  1137 03/08/24  0617 03/08/24  0358 03/07/24  0352 03/07/24  0348   NA  --   --   --   --  136  --  139  --  137  --  138  --  139  --  139   < > 138   POTASSIUM  --   --   --   --  3.2*  --  3.6  --  4.1  --  4.2  --  4.1  --  4.1   < > 4.2   CHLORIDE  --   --   --   --  103  --  107  --  104  --  105  --  106  --  105   < > 103   CO2  --   --   --   --  27  --  25  --  25  --  26  --  24  --  25   < > 24   ANIONGAP  --   --   --   --  6*  --  7  --  8  --  7  --  9  --  9   < > 11   * 146* 142* 160* 176*   < > 188*   < > 222*   < > 153*   < > 174*   < > 130*  134*   < > 139*   BUN  --   --   --   --  30.3*  --  29.4*  --  30.0*  --  31.7*  --  32.8*  --  36.8*   < > 37.6*   CR  --   --   --   --  0.91  --  1.06  --  1.06  --  1.24*  --  1.47*  --  1.74*   < > 2.06*   GFRESTIMATED  --   --   --   --  >90  --  84  --  84  --  70  --  57*  --  47*   < > 38*   MEG  --   --   --   --  8.4*  --  7.8*  --  7.9*  --  7.8*  --  7.7*  --  8.1*   < > 8.8   MAG  --   --   --   --  1.8  --   --   --  2.2  --  2.3  --  2.4*  --  2.4*   < > 2.2   PHOS  --   --   --   --  2.8  --   --   --  4.4  --  4.8*  --  4.8*  --  4.9*   < > 5.7*   PROTTOTAL  --   --   --   --  4.4*  --   --   --  5.0*  --   --   --   --   --  4.9*  --  4.5*   ALBUMIN  --   --   --   --  2.7*  --   --   --  3.1*  --  3.2*  --  3.0*  --  3.1*   < > 2.8*   BILITOTAL  --   --   --   --  1.4*  --   --   --  1.7*  --   --   --   --   --  1.3*  --  1.5*   ALKPHOS  --   --   --   --  76  --   --   --  79  --   --   --   --   --  75  --  56   AST  --   --   --   --  27  --   --   --  43  --   --   --   --   --  59*  --  109*   ALT   --   --   --   --  66  --   --   --  103*  --   --   --   --   --  124*  --  152*    < > = values in this interval not displayed.     CBC  Recent Labs   Lab 03/10/24  0656 03/09/24  0405 03/08/24  1943 03/08/24  1137   HGB 8.4* 9.0* 8.7* 8.1*   WBC 7.2 15.1* 16.6* 11.2*   RBC 2.72* 2.93* 2.88* 2.68*   HCT 25.7* 27.6* 26.4* 24.3*   MCV 95 94 92 91   MCH 30.9 30.7 30.2 30.2   MCHC 32.7 32.6 33.0 33.3   RDW 18.7* 18.7* 18.6* 18.2*   PLT 64* 106* 108* 70*     INR  Recent Labs   Lab 03/10/24  0656 03/09/24  0405 03/08/24  0358 03/07/24  0348 03/06/24  2211 03/06/24  1603 03/06/24  1233 03/06/24  0816 03/05/24  2338 03/05/24  1609 03/05/24  1519   INR 1.30* 1.25* 1.32* 1.28*   < > 1.41*   < > 1.57*   < > 2.12* 2.12*   PTT  --   --   --   --   --  27  --  30  --  87* 89*    < > = values in this interval not displayed.     ABG  Recent Labs   Lab 03/07/24  0348 03/06/24  1808 03/06/24  1235 03/06/24  0501   PH 7.45 7.46* 7.45 7.48*   PCO2 37 37 37 35   PO2 103 76* 136* 66*   HCO3 25 26 26 26   O2PER 50 60 60 50      Urine Studies  Recent Labs   Lab Test 03/04/24  1042 02/23/24  1558 02/15/24  2015 11/09/23  1147   COLOR Yellow Yellow Dark Yellow* Yellow   APPEARANCE Clear Clear Clear Clear   URINEGLC 500* Negative Negative >=1000*   URINEBILI Negative Small* Small* Small*   URINEKETONE Negative Negative Negative Negative   SG 1.007 1.009 1.014 <=1.005   UBLD Negative Negative Negative Negative   URINEPH 5.0 5.0 5.0 6.0   PROTEIN Negative Negative Negative Negative   UROBILINOGEN  --   --   --  2.0*   NITRITE Negative Negative Negative Negative   LEUKEST Negative Negative Negative Negative   RBCU <1 <1 <1 0-2   WBCU <1 <1 <1 0-5     No lab results found.  PTH  No lab results found.  Iron Studies  Recent Labs   Lab Test 12/19/23  0758 08/23/23  1018 08/09/23  1122 07/15/23  1048   IRON 135 120 118 92   KE 2,948* 4,261* 4,611*  --        IMAGING:  All imaging studies reviewed by me.

## 2024-03-10 NOTE — PROGRESS NOTES
03/10/24 1600   Appointment Info   Signing Clinician's Name / Credentials (OT) Rowan Polk, OTD, OTR/L   Rehab Comments (OT) edema   Quick Adds   Quick Adds Edema   General Information   Onset of Illness/Injury or Date of Surgery 24   Referring Physician Isatu Perla, PIYUSH   Additional Occupational Profile Info/Pertinent History of Current Problem Mary Lopez is a 53 year old male admitted on 3/4/2024. He has a history of alcoholic cirrhosis complicated by hepatic encephalopathy and suspected SBP who presented to the hospital for consideration of  donor liver transplant. He was recently admitted for S.bovis bacteremia and was discharged on amoxicillin and levofloxacin. He underwent DDLT on 3/5/2024 and was admitted to the SICU following his surgery for ventilator management and hemodynamic monitoring.   Existing Precautions/Restrictions fall   Limitations/Impairments safety/cognitive   Left Upper Extremity (Weight-bearing Status)   (<10lbs)   Right Upper Extremity (Weight-bearing Status)   (<10lbs)   Left Lower Extremity (Weight-bearing Status) full weight-bearing (FWB)   Right Lower Extremity (Weight-bearing Status) full weight-bearing (FWB)   General Observations and Info Lymphedema: DANILO   Edema General Information   Onset of Edema 24  (ongoing, exacerbated by recent transplant)   Affected Body Part(s) Left LE;Right LE   Edema Etiology Other (see comments)   Etiology Comments liver disease, hypervolemia, decreased muscle pump activation   Edema Precaution Comments No known precautions   General Comments/Previous Edema Treatment/Edema Equipment Has previously had wraps during past admissions, tolerance varies.   Edema Examination/Assessment   Skin Condition Pitting;Intact   Skin Condition Comments Skin intact but with bruising on bilat calves & thighs, somewhat firm 3+ pitting present from MTPs to mid thigh. Feet and ankles bulbous appearing.   Scar No   Ulcerations No    Skin Integrity intact, bruising   Pitting Assessment 3+   Edema Assessment Comments pt endorsing sensitivity in BLE   Clinical Impression   Criteria for Skilled Therapeutic Interventions Met (OT) Yes, treatment indicated   OT Diagnosis bilateral LE edema impacting pain, mobility, function   Edema: Patient Presentation Edema   OT Problem List-Impairments impacting ADL problems related to;activity tolerance impaired;cognition;mobility;range of motion (ROM);sensation;pain;post-surgical precautions   Assessment of Occupational Performance 5 or more Performance Deficits   Identified Performance Deficits dressing, g/h, toileting, bathing, functional transfers, mobility   Planned Therapy Interventions (OT) ADL retraining;bed mobility training;cognition;ROM;transfer training;home program guidelines;progressive activity/exercise;risk factor education;manual therapy   Edema: Planned Interventions Gradient compression bandaging;Edema exercises;Precautions to prevent infection/exacerbation;Education   Clinical Decision Making Complexity (OT) problem focused assessment/low complexity   Risk & Benefits of therapy have been explained evaluation/treatment results reviewed;care plan/treatment goals reviewed;risks/benefits reviewed;current/potential barriers reviewed;participants voiced agreement with care plan;participants included;patient;spouse/significant other   OT Goals   Therapy Frequency (OT) Other (see comments)  (5x/week OT, 3-4x/week edema)   OT Predicted Duration/Target Date for Goal Attainment 03/22/24   OT Goals Edema   OT: Edema education to increase ability to manage edema after discharge from the hospital Patient;Caregiver;Verbalize;Demonstrate;[place];Skin care routine;signs/symptoms of intolerance;wear schedule;limb positioning;garrnet/bandage care;discharge recommendations   OT: Management of edema bandages Patient;Caregiver;Verbalize;Demonstrate;[place]   OT: Functional edema exercise program to  "reduce limb volume, increase activity tolerance and improve independence with ADL Patient;Caregiver;Verbalize;Demonstrates;[place];HEP   Self-Care/Home Management   Self-Care/Home Mgmt/ADL, Compensatory, Meal Prep Minutes (36150) 11   Symptoms Noted During/After Treatment (Meal Preparation/Planning Training) fatigue   Treatment Detail/Skilled Intervention OT arriving for edema eval, however pt needing to use bathroom, facilitated toileting to progress overall IND. facilitated sup>sit EOB with mod Ax2, STS from EOB with CGA and FWW. pt confused, req constant redirection and cues throughout. Descent to toilet with min A, time taken to void. STS from toilet with mod A, OT providing cues to adjust foot position however pt impulsively standing. Ambulated back to bed, returned to supine with max A.   Manual Therapy   Manual Therapy: Mobilization, MFR, MLD, friction massage minutes (79115) 39   Symptoms noted during/after treatment fatigue;increased pain   Treatment Detail/Skilled Intervention Edema: Pt educated on lymphatic system anatomy/physiology, precautions, and treatment options. See evaluation above for description of BLE edema and skin. Pt and spouse endorsing hesitation with wraps on this date, wanting to trial comperm prior to return to wraps (used during prior admission). OT arriving with comperm size G and F, pt immediately disliking size F however G more tolerable. Ultimately, pt deciding to use wraps instead. Pt is appropriate for use of GCBs to promote fluid mobilization and edema management, for improved skin integrity, functional mobility, and ADL independence. Pt's LE's washed and lotioned with Sween 24. Donned Size M stockinette base layer, followed by 1 x 8cm nasima from MTP's to distal shin and 1 x 10 cm nasima from ankle to knee crease using \"quick wrap technique\" with 50% overlap and 25% stretch. Donned size L stockinette from knee crease to thigh, followed by 1 x 10 cm nasima using herringbone " technique over knee and thigh. Pt reporting comfort. Pt educated on wear 24-48 hr wear schedule and indications for removal (pain, numbness, tingling, soiled, or loose/falling off). Pt educated in conservative strategies for managing BLE edema, including elevation and muscle pump activation. Pt verbalized understanding of all education. Patient care order entered   OT Discharge Planning   OT Plan OT - monitor cog, standing tolerance/short distances with chair follow, seated ADLs. Edema - new G2 above thighs, light wrap   OT Discharge Recommendation (DC Rec) [facility]-Motivated patient will benefit from intensive, interdisciplinary therapy.  Anticipate will be able to tolerate 3 hours of therapy per day   OT Rationale for DC Rec pt significantly below baseline, likely to require rehab stay at d/c to progress IND/safety with ADLs and mobility. pt with good family support and IND prior to transplant   Total Session Time   Timed Code Treatment Minutes 50   Total Session Time (sum of timed and untimed services) 50

## 2024-03-10 NOTE — PLAN OF CARE
"VS: BP (!) 113/96   Pulse 105   Temp 97.8  F (36.6  C) (Oral)   Resp 20   Ht 1.73 m (5' 8.11\")   Wt 112.9 kg (248 lb 14.4 oz)   SpO2 96%   BMI 37.72 kg/m      Cares: 1900 - 0730     Neuro: disorientated to time and situation. Pt continues to have auditory hallucinations of hearing music playing constantly.   VS: VSS   Cardiac: WDL, afebrile.   Respiratory: RA - 1L O2, diminished bases.   GI/: intermittently incontinent of bowel and bladder. Primafit on overnight (pt getting bumex) - voiding lots, loose BM's on this shift.   Skin: clamshell abdominal incision stapled RON, Right side has small gauze for scant drainage. Right chest/neck bruising.   Diet: Regular + cont. TF @ 40mL/hr (GR) w/ FWF 30mL Q4H      Labs: awaiting AM lab results   BG: Q1H on insulin gtt (117 - 171) on alg 4   LDA: NJ, Right TIMOTHY - bloody OP, Left internal jugular, Right HD line, Left and Right PIV SL  Mobility: Ax2 (bed alarm on)   Pain/Nausea: mild 3/10 abdominal pain, denies nausea   PRN medications: none given   Plan of Care: continue with current POC and update MD with any changes.   "

## 2024-03-10 NOTE — PLAN OF CARE
"/79   Pulse 102   Temp 98.5  F (36.9  C) (Oral)   Resp 18   Ht 1.73 m (5' 8.11\")   Wt 112.9 kg (248 lb 14.4 oz)   SpO2 92%   BMI 37.72 kg/m      Shift: 4934-4009  Isolation Status: contact for VRE  VS: stable on room air, afebrile  Neuro: Aox2  Behaviors: calm, cooperative, confused  BG: hourly. Insulin drip Alg #4  Labs: ALT=103, AST=46, creat=1.06  Respiratory: WDL  Cardiac: WDL  Pain/Nausea: abdominal pain, denies nausea  PRN: oxycodone x1   Diet: Regular  IV Access: Right internal jugular - HD line, left triple lumen internal jugular, right and left PIV  Infusion(s): insulin with NS carrier, TKO.   Lines/Drains: NJ with enteral feedings at 40mL/hour. RLQ TIMOTHY to bulb suction.  GI/: BM 3/9 prior to arrival to unit. Voiding. Urgency with bowel and bladder. Bumex injection administered.   Skin: generalized bruising  Mobility: Ax2 + gait belt + walker or IV pole. Bed alarm on   Plan: Continue plan of care       "

## 2024-03-10 NOTE — PROGRESS NOTES
Admitted/transferred from: 4E  Time of arrival on unit 1630   2 RN full  skin assessment completed by Margoth GARCIA and Mitali MILLS   Skin assessment finding: clamshell abdominal incision, bruising on right chest/neck. Scattered bruising on both left and right forearms. Bruise on top of right hand. Right TIMOTHY drain. BLE edema   Interventions/actions: elevated heels. Continue to turn and reposition Q2H.     Will continue to monitor.

## 2024-03-11 ENCOUNTER — APPOINTMENT (OUTPATIENT)
Dept: NUCLEAR MEDICINE | Facility: CLINIC | Age: 54
DRG: 005 | End: 2024-03-11
Attending: NURSE PRACTITIONER
Payer: COMMERCIAL

## 2024-03-11 ENCOUNTER — APPOINTMENT (OUTPATIENT)
Dept: OCCUPATIONAL THERAPY | Facility: CLINIC | Age: 54
DRG: 005 | End: 2024-03-11
Attending: TRANSPLANT SURGERY
Payer: COMMERCIAL

## 2024-03-11 LAB
ALBUMIN SERPL BCG-MCNC: 2.7 G/DL (ref 3.5–5.2)
ALP SERPL-CCNC: 71 U/L (ref 40–150)
ALT SERPL W P-5'-P-CCNC: 50 U/L (ref 0–70)
ANION GAP SERPL CALCULATED.3IONS-SCNC: 5 MMOL/L (ref 7–15)
ANION GAP SERPL CALCULATED.3IONS-SCNC: 6 MMOL/L (ref 7–15)
AST SERPL W P-5'-P-CCNC: 29 U/L (ref 0–45)
BACTERIA BLD CULT: NO GROWTH
BACTERIA BLD CULT: NO GROWTH
BASOPHILS # BLD AUTO: 0 10E3/UL (ref 0–0.2)
BASOPHILS NFR BLD AUTO: 0 %
BILIRUB DIRECT SERPL-MCNC: 0.85 MG/DL (ref 0–0.3)
BILIRUB SERPL-MCNC: 1.5 MG/DL
BUN SERPL-MCNC: 26.8 MG/DL (ref 6–20)
BUN SERPL-MCNC: 29.7 MG/DL (ref 6–20)
CA-I BLD-MCNC: 4.9 MG/DL (ref 4.4–5.2)
CALCIUM SERPL-MCNC: 8.4 MG/DL (ref 8.6–10)
CALCIUM SERPL-MCNC: 8.5 MG/DL (ref 8.6–10)
CHLORIDE SERPL-SCNC: 103 MMOL/L (ref 98–107)
CHLORIDE SERPL-SCNC: 99 MMOL/L (ref 98–107)
CREAT SERPL-MCNC: 0.76 MG/DL (ref 0.67–1.17)
CREAT SERPL-MCNC: 0.79 MG/DL (ref 0.67–1.17)
DEPRECATED HCO3 PLAS-SCNC: 29 MMOL/L (ref 22–29)
DEPRECATED HCO3 PLAS-SCNC: 31 MMOL/L (ref 22–29)
EGFRCR SERPLBLD CKD-EPI 2021: >90 ML/MIN/1.73M2
EGFRCR SERPLBLD CKD-EPI 2021: >90 ML/MIN/1.73M2
EOSINOPHIL # BLD AUTO: 0.3 10E3/UL (ref 0–0.7)
EOSINOPHIL NFR BLD AUTO: 7 %
ERYTHROCYTE [DISTWIDTH] IN BLOOD BY AUTOMATED COUNT: 18.7 % (ref 10–15)
GLUCOSE BLDC GLUCOMTR-MCNC: 107 MG/DL (ref 70–99)
GLUCOSE BLDC GLUCOMTR-MCNC: 118 MG/DL (ref 70–99)
GLUCOSE BLDC GLUCOMTR-MCNC: 119 MG/DL (ref 70–99)
GLUCOSE BLDC GLUCOMTR-MCNC: 122 MG/DL (ref 70–99)
GLUCOSE BLDC GLUCOMTR-MCNC: 131 MG/DL (ref 70–99)
GLUCOSE BLDC GLUCOMTR-MCNC: 133 MG/DL (ref 70–99)
GLUCOSE BLDC GLUCOMTR-MCNC: 135 MG/DL (ref 70–99)
GLUCOSE BLDC GLUCOMTR-MCNC: 136 MG/DL (ref 70–99)
GLUCOSE BLDC GLUCOMTR-MCNC: 136 MG/DL (ref 70–99)
GLUCOSE BLDC GLUCOMTR-MCNC: 138 MG/DL (ref 70–99)
GLUCOSE BLDC GLUCOMTR-MCNC: 140 MG/DL (ref 70–99)
GLUCOSE BLDC GLUCOMTR-MCNC: 149 MG/DL (ref 70–99)
GLUCOSE BLDC GLUCOMTR-MCNC: 154 MG/DL (ref 70–99)
GLUCOSE BLDC GLUCOMTR-MCNC: 158 MG/DL (ref 70–99)
GLUCOSE BLDC GLUCOMTR-MCNC: 159 MG/DL (ref 70–99)
GLUCOSE BLDC GLUCOMTR-MCNC: 160 MG/DL (ref 70–99)
GLUCOSE BLDC GLUCOMTR-MCNC: 170 MG/DL (ref 70–99)
GLUCOSE BLDC GLUCOMTR-MCNC: 171 MG/DL (ref 70–99)
GLUCOSE BLDC GLUCOMTR-MCNC: 179 MG/DL (ref 70–99)
GLUCOSE SERPL-MCNC: 136 MG/DL (ref 70–99)
GLUCOSE SERPL-MCNC: 150 MG/DL (ref 70–99)
HCT VFR BLD AUTO: 26.7 % (ref 40–53)
HGB BLD-MCNC: 8.5 G/DL (ref 13.3–17.7)
IMM GRANULOCYTES # BLD: 0 10E3/UL
IMM GRANULOCYTES NFR BLD: 1 %
INR PPP: 1.18 (ref 0.85–1.15)
LYMPHOCYTES # BLD AUTO: 0.5 10E3/UL (ref 0.8–5.3)
LYMPHOCYTES NFR BLD AUTO: 9 %
MAGNESIUM SERPL-MCNC: 1.3 MG/DL (ref 1.7–2.3)
MAGNESIUM SERPL-MCNC: 1.5 MG/DL (ref 1.7–2.3)
MAGNESIUM SERPL-MCNC: 1.8 MG/DL (ref 1.7–2.3)
MCH RBC QN AUTO: 30.5 PG (ref 26.5–33)
MCHC RBC AUTO-ENTMCNC: 31.8 G/DL (ref 31.5–36.5)
MCV RBC AUTO: 96 FL (ref 78–100)
MONOCYTES # BLD AUTO: 0.5 10E3/UL (ref 0–1.3)
MONOCYTES NFR BLD AUTO: 9 %
NEUTROPHILS # BLD AUTO: 3.6 10E3/UL (ref 1.6–8.3)
NEUTROPHILS NFR BLD AUTO: 74 %
NRBC # BLD AUTO: 0 10E3/UL
NRBC BLD AUTO-RTO: 0 /100
PHOSPHATE SERPL-MCNC: 2.2 MG/DL (ref 2.5–4.5)
PHOSPHATE SERPL-MCNC: 2.3 MG/DL (ref 2.5–4.5)
PLATELET # BLD AUTO: 59 10E3/UL (ref 150–450)
POTASSIUM SERPL-SCNC: 3.2 MMOL/L (ref 3.4–5.3)
POTASSIUM SERPL-SCNC: 3.3 MMOL/L (ref 3.4–5.3)
POTASSIUM SERPL-SCNC: 3.4 MMOL/L (ref 3.4–5.3)
PROT SERPL-MCNC: 4.4 G/DL (ref 6.4–8.3)
RBC # BLD AUTO: 2.79 10E6/UL (ref 4.4–5.9)
SODIUM SERPL-SCNC: 136 MMOL/L (ref 135–145)
SODIUM SERPL-SCNC: 137 MMOL/L (ref 135–145)
TACROLIMUS BLD-MCNC: 4.9 UG/L (ref 5–15)
TME LAST DOSE: ABNORMAL H
TME LAST DOSE: ABNORMAL H
WBC # BLD AUTO: 4.9 10E3/UL (ref 4–11)

## 2024-03-11 PROCEDURE — 85610 PROTHROMBIN TIME: CPT | Performed by: NURSE PRACTITIONER

## 2024-03-11 PROCEDURE — 258N000003 HC RX IP 258 OP 636: Performed by: NURSE PRACTITIONER

## 2024-03-11 PROCEDURE — 84100 ASSAY OF PHOSPHORUS: CPT | Performed by: NURSE PRACTITIONER

## 2024-03-11 PROCEDURE — 78226 HEPATOBILIARY SYSTEM IMAGING: CPT

## 2024-03-11 PROCEDURE — 250N000013 HC RX MED GY IP 250 OP 250 PS 637: Performed by: NURSE PRACTITIONER

## 2024-03-11 PROCEDURE — 250N000009 HC RX 250: Performed by: NURSE PRACTITIONER

## 2024-03-11 PROCEDURE — 84132 ASSAY OF SERUM POTASSIUM: CPT

## 2024-03-11 PROCEDURE — 250N000013 HC RX MED GY IP 250 OP 250 PS 637: Performed by: TRANSPLANT SURGERY

## 2024-03-11 PROCEDURE — 83735 ASSAY OF MAGNESIUM: CPT | Performed by: NURSE PRACTITIONER

## 2024-03-11 PROCEDURE — 85025 COMPLETE CBC W/AUTO DIFF WBC: CPT | Performed by: NURSE PRACTITIONER

## 2024-03-11 PROCEDURE — 250N000012 HC RX MED GY IP 250 OP 636 PS 637: Performed by: NURSE PRACTITIONER

## 2024-03-11 PROCEDURE — 36415 COLL VENOUS BLD VENIPUNCTURE: CPT

## 2024-03-11 PROCEDURE — 93010 ELECTROCARDIOGRAM REPORT: CPT | Performed by: INTERNAL MEDICINE

## 2024-03-11 PROCEDURE — 250N000011 HC RX IP 250 OP 636: Mod: JZ | Performed by: NURSE PRACTITIONER

## 2024-03-11 PROCEDURE — 36592 COLLECT BLOOD FROM PICC: CPT | Performed by: NURSE PRACTITIONER

## 2024-03-11 PROCEDURE — 80076 HEPATIC FUNCTION PANEL: CPT | Performed by: NURSE PRACTITIONER

## 2024-03-11 PROCEDURE — 80197 ASSAY OF TACROLIMUS: CPT | Performed by: NURSE PRACTITIONER

## 2024-03-11 PROCEDURE — 99207 PR NO BILLABLE SERVICE THIS VISIT: CPT | Performed by: TRANSPLANT SURGERY

## 2024-03-11 PROCEDURE — A9537 TC99M MEBROFENIN: HCPCS | Performed by: TRANSPLANT SURGERY

## 2024-03-11 PROCEDURE — 120N000011 HC R&B TRANSPLANT UMMC

## 2024-03-11 PROCEDURE — 343N000001 HC RX 343: Performed by: TRANSPLANT SURGERY

## 2024-03-11 PROCEDURE — 93005 ELECTROCARDIOGRAM TRACING: CPT

## 2024-03-11 PROCEDURE — 84132 ASSAY OF SERUM POTASSIUM: CPT | Performed by: NURSE PRACTITIONER

## 2024-03-11 PROCEDURE — 84100 ASSAY OF PHOSPHORUS: CPT

## 2024-03-11 PROCEDURE — 78226 HEPATOBILIARY SYSTEM IMAGING: CPT | Mod: 26 | Performed by: RADIOLOGY

## 2024-03-11 PROCEDURE — 82374 ASSAY BLOOD CARBON DIOXIDE: CPT | Performed by: NURSE PRACTITIONER

## 2024-03-11 PROCEDURE — 82330 ASSAY OF CALCIUM: CPT

## 2024-03-11 PROCEDURE — 250N000011 HC RX IP 250 OP 636: Performed by: NURSE PRACTITIONER

## 2024-03-11 PROCEDURE — 250N000011 HC RX IP 250 OP 636: Performed by: INTERNAL MEDICINE

## 2024-03-11 PROCEDURE — 83735 ASSAY OF MAGNESIUM: CPT

## 2024-03-11 PROCEDURE — 97535 SELF CARE MNGMENT TRAINING: CPT | Mod: GO

## 2024-03-11 PROCEDURE — 250N000013 HC RX MED GY IP 250 OP 250 PS 637

## 2024-03-11 RX ORDER — METOPROLOL TARTRATE 1 MG/ML
5 INJECTION, SOLUTION INTRAVENOUS EVERY 5 MIN PRN
Status: DISCONTINUED | OUTPATIENT
Start: 2024-03-11 | End: 2024-03-12

## 2024-03-11 RX ORDER — MAGNESIUM SULFATE HEPTAHYDRATE 40 MG/ML
2 INJECTION, SOLUTION INTRAVENOUS ONCE
Status: COMPLETED | OUTPATIENT
Start: 2024-03-11 | End: 2024-03-11

## 2024-03-11 RX ORDER — KIT FOR THE PREPARATION OF TECHNETIUM TC 99M MEBROFENIN 45 MG/10ML
4-6 INJECTION, POWDER, LYOPHILIZED, FOR SOLUTION INTRAVENOUS ONCE
Status: COMPLETED | OUTPATIENT
Start: 2024-03-11 | End: 2024-03-11

## 2024-03-11 RX ORDER — MAGNESIUM OXIDE 400 MG/1
400 TABLET ORAL DAILY
Status: DISCONTINUED | OUTPATIENT
Start: 2024-03-11 | End: 2024-03-12

## 2024-03-11 RX ORDER — OXYCODONE HYDROCHLORIDE 5 MG/1
5 TABLET ORAL EVERY 4 HOURS PRN
Status: DISCONTINUED | OUTPATIENT
Start: 2024-03-11 | End: 2024-03-12

## 2024-03-11 RX ORDER — PANTOPRAZOLE SODIUM 40 MG/1
40 TABLET, DELAYED RELEASE ORAL
Status: DISCONTINUED | OUTPATIENT
Start: 2024-03-12 | End: 2024-03-16

## 2024-03-11 RX ORDER — ACETAMINOPHEN 325 MG/1
650 TABLET ORAL EVERY 8 HOURS
Status: DISCONTINUED | OUTPATIENT
Start: 2024-03-11 | End: 2024-03-16

## 2024-03-11 RX ORDER — MAGNESIUM SULFATE HEPTAHYDRATE 40 MG/ML
2 INJECTION, SOLUTION INTRAVENOUS ONCE
Qty: 50 ML | Refills: 0 | Status: COMPLETED | OUTPATIENT
Start: 2024-03-11 | End: 2024-03-11

## 2024-03-11 RX ORDER — AMINO ACIDS/PROTEIN HYDROLYS 11G-40/45
1 LIQUID IN PACKET (ML) ORAL 2 TIMES DAILY
Status: DISCONTINUED | OUTPATIENT
Start: 2024-03-11 | End: 2024-03-18

## 2024-03-11 RX ORDER — POTASSIUM CHLORIDE 1.5 G/1.58G
40 POWDER, FOR SOLUTION ORAL ONCE
Status: COMPLETED | OUTPATIENT
Start: 2024-03-11 | End: 2024-03-11

## 2024-03-11 RX ORDER — MULTIPLE VITAMINS W/ MINERALS TAB 9MG-400MCG
1 TAB ORAL DAILY
Status: DISCONTINUED | OUTPATIENT
Start: 2024-03-11 | End: 2024-03-21 | Stop reason: HOSPADM

## 2024-03-11 RX ORDER — POTASSIUM CHLORIDE 750 MG/1
40 TABLET, EXTENDED RELEASE ORAL ONCE
Status: COMPLETED | OUTPATIENT
Start: 2024-03-11 | End: 2024-03-11

## 2024-03-11 RX ORDER — LIDOCAINE 4 G/G
2 PATCH TOPICAL
Status: DISCONTINUED | OUTPATIENT
Start: 2024-03-11 | End: 2024-03-21 | Stop reason: HOSPADM

## 2024-03-11 RX ORDER — OXYCODONE HYDROCHLORIDE 10 MG/1
10 TABLET ORAL EVERY 4 HOURS PRN
Status: DISCONTINUED | OUTPATIENT
Start: 2024-03-11 | End: 2024-03-12

## 2024-03-11 RX ORDER — MAGNESIUM SULFATE HEPTAHYDRATE 40 MG/ML
2 INJECTION, SOLUTION INTRAVENOUS ONCE
Status: DISCONTINUED | OUTPATIENT
Start: 2024-03-11 | End: 2024-03-11

## 2024-03-11 RX ADMIN — MAGNESIUM SULFATE IN WATER 2 G: 40 INJECTION, SOLUTION INTRAVENOUS at 13:48

## 2024-03-11 RX ADMIN — PREDNISONE 5 MG: 5 TABLET ORAL at 09:01

## 2024-03-11 RX ADMIN — ACETAMINOPHEN 650 MG: 325 TABLET, FILM COATED ORAL at 09:00

## 2024-03-11 RX ADMIN — Medication 1 PACKET: at 09:32

## 2024-03-11 RX ADMIN — TACROLIMUS 5 MG: 5 CAPSULE ORAL at 18:14

## 2024-03-11 RX ADMIN — METOPROLOL TARTRATE 5 MG: 5 INJECTION INTRAVENOUS at 08:27

## 2024-03-11 RX ADMIN — METOPROLOL TARTRATE 5 MG: 5 INJECTION INTRAVENOUS at 08:14

## 2024-03-11 RX ADMIN — MEBROFENIN 6 MILLICURIE: 45 INJECTION, POWDER, LYOPHILIZED, FOR SOLUTION INTRAVENOUS at 14:26

## 2024-03-11 RX ADMIN — TACROLIMUS 5 MG: 5 CAPSULE ORAL at 09:00

## 2024-03-11 RX ADMIN — BUMETANIDE 2 MG: 0.25 INJECTION INTRAMUSCULAR; INTRAVENOUS at 16:08

## 2024-03-11 RX ADMIN — Medication 1 PACKET: at 20:15

## 2024-03-11 RX ADMIN — OXYCODONE HYDROCHLORIDE 5 MG: 5 TABLET ORAL at 11:23

## 2024-03-11 RX ADMIN — MYCOPHENOLATE MOFETIL 750 MG: 250 CAPSULE ORAL at 18:14

## 2024-03-11 RX ADMIN — MAGNESIUM OXIDE TAB 400 MG (241.3 MG ELEMENTAL MG) 400 MG: 400 (241.3 MG) TAB at 09:00

## 2024-03-11 RX ADMIN — INSULIN HUMAN 4 UNITS/HR: 1 INJECTION, SOLUTION INTRAVENOUS at 04:36

## 2024-03-11 RX ADMIN — BUMETANIDE 2 MG: 0.25 INJECTION INTRAMUSCULAR; INTRAVENOUS at 10:13

## 2024-03-11 RX ADMIN — OXYCODONE HYDROCHLORIDE 5 MG: 5 TABLET ORAL at 20:22

## 2024-03-11 RX ADMIN — Medication 1 TABLET: at 11:23

## 2024-03-11 RX ADMIN — SIMETHICONE 80 MG: 80 TABLET, CHEWABLE ORAL at 02:02

## 2024-03-11 RX ADMIN — VALGANCICLOVIR 450 MG: 450 TABLET, FILM COATED ORAL at 20:04

## 2024-03-11 RX ADMIN — URSODIOL 300 MG: 300 CAPSULE ORAL at 20:04

## 2024-03-11 RX ADMIN — MYCOPHENOLATE MOFETIL 750 MG: 250 CAPSULE ORAL at 08:59

## 2024-03-11 RX ADMIN — Medication 40 MG: at 09:31

## 2024-03-11 RX ADMIN — THIAMINE HCL TAB 100 MG 100 MG: 100 TAB at 09:01

## 2024-03-11 RX ADMIN — MAGNESIUM SULFATE IN WATER 2 G: 40 INJECTION, SOLUTION INTRAVENOUS at 08:53

## 2024-03-11 RX ADMIN — POTASSIUM CHLORIDE 40 MEQ: 750 TABLET, EXTENDED RELEASE ORAL at 20:04

## 2024-03-11 RX ADMIN — MICAFUNGIN SODIUM 100 MG: 50 INJECTION, POWDER, LYOPHILIZED, FOR SOLUTION INTRAVENOUS at 11:15

## 2024-03-11 RX ADMIN — ACETAMINOPHEN 650 MG: 325 TABLET, FILM COATED ORAL at 17:08

## 2024-03-11 RX ADMIN — SULFAMETHOXAZOLE AND TRIMETHOPRIM 1 TABLET: 400; 80 TABLET ORAL at 20:04

## 2024-03-11 RX ADMIN — URSODIOL 300 MG: 300 CAPSULE ORAL at 09:00

## 2024-03-11 ASSESSMENT — ACTIVITIES OF DAILY LIVING (ADL)
ADLS_ACUITY_SCORE: 37
ADLS_ACUITY_SCORE: 34
ADLS_ACUITY_SCORE: 37
ADLS_ACUITY_SCORE: 34
ADLS_ACUITY_SCORE: 36
ADLS_ACUITY_SCORE: 37
ADLS_ACUITY_SCORE: 34
ADLS_ACUITY_SCORE: 37
ADLS_ACUITY_SCORE: 34
ADLS_ACUITY_SCORE: 36
ADLS_ACUITY_SCORE: 37
ADLS_ACUITY_SCORE: 34
ADLS_ACUITY_SCORE: 34
ADLS_ACUITY_SCORE: 37
ADLS_ACUITY_SCORE: 36
ADLS_ACUITY_SCORE: 34
ADLS_ACUITY_SCORE: 37

## 2024-03-11 NOTE — PROGRESS NOTES
Transplant Social Work Services Progress Note    Date of Initial Social Work Evaluation: 2024  Collaborated with: Patient's spouse at bedside, post transplant coordinator, [facility]    Data: Mary underwent a  donor liver transplant on 3/5/2024.  Intervention: Current recommendation is ARU. I met with patient's spouse at bedside. Patient was off the unit. I reviewed current recommendation for ARU. Adina is agreeable with ARU, and reported that she feels like he does need extra support at this time. I reviewed that this recommendation could change if he physically progresses out of ARU. Transportation would be provided by us. No further questions or concerns at this time.   [facility] ARU referral completed.     Assessment: Current recommendation is ARU.   Education provided by SW: ARU.   Plan:    Discharge Plans in Progress: ARU     Barriers to d/c plan: Medical stability    Follow up Plan: Transplant  will continue to follow for discharge planning.    CAPRI Ballard, KYLE  Liver Transplant   [facility]  Phone: [phone]  Primary  is KYLE Brock

## 2024-03-11 NOTE — PROGRESS NOTES
Scr back to baseline 0.79. UOP robust. Recommend removal of dialysis catheter. Please replete K. Continue diuresis per primary team.     Transplant nephrology will sign off. Please call with questions/concerns.     Kirill Gray MD, EDDI  Transplant Nephrology  Contact information available via [facility] Paging/Directory

## 2024-03-11 NOTE — PROGRESS NOTES
CLINICAL NUTRITION SERVICES - REASSESSMENT NOTE     Nutrition Prescription    RECOMMENDATIONS FOR MDs/PROVIDERS TO ORDER:  Continue to meet full nutrition needs via EN at this time until pt is able to meet ~60% est nutrition needs via PO intakes (per calorie counts, ~1405 kcal/d and 70g pro/d)     Malnutrition Status:    Severe malnutrition in the context of acute illness/disease    Recommendations already ordered by Registered Dietitian (RD):  Renal formula no longer needed, no longer on CRRT - adjust formula & protein provisions:   TwoCal HN @ 40 mL/hr (960 mL/day) + 2 pkts Prosource TF20 to provide 2080 kcals (26 kcal/kg/day), 121 g PRO (1.5 g/kg/day), 672 mL H2O, 210 g CHO and 5 g Fiber daily.   Strawberry Glucerna TID     Future/Additional Recommendations:  Continue to monitor nutrition-related findings and follow pt per protocol     EVALUATION OF THE PROGRESS TOWARD GOALS   Diet: Regular  PO Intake: 25-75%, 1 small meal/d over the past 3 days     Nutrition Support:   -Dosing weight: 78.1 kg  -EN access via NDT  -Regimen:   3/6 - 3/8: TwoCal HN @ 40 mL/hr (960 mL/day) + 2 pkts Prosource TF20 to provide 2080 kcals (26 kcal/kg/day), 121 g PRO (1.5 g/kg/day), 672 mL H2O, 210 g CHO and 5 g Fiber daily.     3/8 - 3/11: Novasource Renal (or equivalent) @ 40 ml/hr (960 ml) + 4 packs ProSource TF20 provides 2240 kcal, 167 g pro, 176 g CHO, 688 ml free water, and 0 g fiber daily.     -FWF 30 ml q4h (180 mL/day) + 688 mL from TF for total free water provision of 868 mL/day    EN Intake:   Average 7-day TF intakes: 516 mL formula, 2 packets Prosource =  1191 Kcals (15 Kcal/kg), and 85 g pro (1.1 g/kg). This is meeting 60% of low-end est Kcal needs, and 71% of low-end est protein needs.     NEW FINDINGS   Weights:  Most Recent Weight: 102.4 kg (225 lb 11.2 oz)  on 3/11/24 via Standing scale - new lowest wt throughout admission, will adjust dosing wt to  78.1 kg (IBW 70 kg)   Body mass index is 34.21 kg/m .  -4.8 kg and  +5.5 L from admission     ASSESSED NUTRITION NEEDS - adjusted 3/11   Dosing Weight: 78.1 kg (adj wt based on IBW of 70 kg and actual wt of 102.4 kg 3/11)   Estimated Energy Needs: 2343 - 2734 kcals/day (30-35 kcals/kg)   Justification: Increased needs s/p liver tx   Estimated Protein Needs: 117 - 156 grams protein/day (1.5 - 2 grams of pro/kg)   Justification: Increased needs s/p liver tx)  Estimated Fluid Needs: per MD pending fluid status     GI:  0-5 unmeasured BMs per day over past week, per I/O.   First BM of admission 3/8   Last BM: 03/10/24    Medications:  Bumex, sliding scale insulin, mag-ox 400 mg, mag sulfate 2g, micafungin, MVI-M liquid, mycophenolate, protonix, miralax, K chloride, prednisone, PSTF20 (adjusted today to 2/d from 4/d), senna, tacrolimus, thiamine 100 mg/d, ursodiol, valcyte      Labs:  K & phos slightly low this morning  - moving to non-renal formula today; Mg low - RN managed replacement protocols not ordered   Electrolytes  Potassium (mmol/L)   Date Value   03/11/2024 3.2 (L)   03/10/2024 3.2 (L)   03/09/2024 3.6   03/12/2022 3.8   12/04/2021 3.8   07/05/2020 4.2   11/29/2005 3.6     Potassium POCT (mmol/L)   Date Value   03/05/2024 4.1   03/05/2024 4.2   03/05/2024 4.1     Phosphorus (mg/dL)   Date Value   03/11/2024 2.3 (L)   03/10/2024 2.8   03/09/2024 4.4   03/08/2024 4.8 (H)   03/08/2024 4.8 (H)    Blood Glucose  Glucose (mg/dL)   Date Value   03/11/2024 150 (H)   03/12/2022 135 (H)   12/04/2021 117 (H)   07/05/2020 113 (H)   11/29/2005 108     GLUCOSE BY METER POCT (mg/dL)   Date Value   03/11/2024 107 (H)   03/11/2024 118 (H)   03/11/2024 136 (H)   03/11/2024 136 (H)   03/11/2024 135 (H)     Hemoglobin A1C (%)   Date Value   02/25/2024 4.8   12/19/2023 7.7 (H)   10/31/2023 8.1 (H)   08/23/2023 5.1   03/12/2022 5.7 (H)    Inflammatory Markers  WBC (10e9/L)   Date Value   12/19/2006 10.8   11/29/2005 7.1     WBC Count (10e3/uL)   Date Value   03/11/2024 4.9   03/10/2024 7.2    03/09/2024 15.1 (H)     Albumin (g/dL)   Date Value   03/11/2024 2.7 (L)   03/10/2024 2.7 (L)   03/09/2024 3.1 (L)   09/03/2022 4.0   03/12/2022 3.8   12/19/2006 5.2 (H)   11/29/2005 4.2      Magnesium (mg/dL)   Date Value   03/11/2024 1.3 (L)   03/10/2024 1.8   03/09/2024 2.2   11/29/2005 1.9     Sodium (mmol/L)   Date Value   03/11/2024 137   03/10/2024 136   03/09/2024 139   07/05/2020 139   11/29/2005 143    Renal  Urea Nitrogen (mg/dL)   Date Value   03/11/2024 29.7 (H)   03/10/2024 30.3 (H)   03/09/2024 29.4 (H)   03/12/2022 11   12/04/2021 21   07/05/2020 13   11/29/2005 10     Creatinine (mg/dL)   Date Value   03/11/2024 0.79   03/10/2024 0.91   03/09/2024 1.06   07/05/2020 0.95   11/29/2005 0.78 (L)     Additional  Triglycerides (mg/dL)   Date Value   03/12/2022 151 (H)   12/04/2021 174 (H)   07/05/2020 229 (H)     Ketones Urine (mg/dL)   Date Value   03/04/2024 Negative   11/29/2005 Negative     Platelet Count   Date Value   03/11/2024 59 10e3/uL (L)   12/19/2006 361 10e9/L     aPTT (Seconds)   Date Value   03/06/2024 27     INR (no units)   Date Value   03/11/2024 1.18 (H)        RENAL  GFR >90    RESPIRATORY  Supplemental O2 - nasal cannula     SKIN  No pressure injuries documented at this time  and Surgical incision(s) noted    MALNUTRITION  % Intake: < 75% for > 7 days (moderate)  % Weight Loss: > 2% in 1 week (severe malnutrition)  Subcutaneous Fat Loss: Facial region: mild - per previous RD assess  Muscle Loss: Temporal: moderate, Facial & jaw region: moderate, and Upper arm (bicep, tricep): moderate- per previous RD assess  Fluid Accumulation/Edema: Severe  Malnutrition Diagnosis: Severe malnutrition in the context of acute illness/disease    Previous Goals   Diet adv v nutrition support within 2-3 days.   Evaluation: Met    Previous Nutrition Diagnosis  Inadequate oral intake related to reduced appetite, early satiety as evidenced by patient with reduced strength, muscle loss and eating <50% over  the last at least 5 days.    Evaluation: No change    CURRENT NUTRITION DIAGNOSIS  Inadequate oral intake related to reduced appetite, early satiety as evidenced by patient with reduced strength, muscle loss and eating <50% over the last at least 5 days, need for EN to meet nutrition needs.      INTERVENTIONS  Implementation  Enteral Nutrition - continue to meet full needs  Medical food supplement therapy     Goals  Total avg nutritional intake to meet a minimum of 30 kcal/kg and 1.5 g PRO/kg daily (per dosing wt 78.1 kg).    Monitoring/Evaluation  Progress toward goals will be monitored and evaluated per protocol.    Kiera Ribeiro, MPH, RDN, LD  7A + 7B (beds 7969-5772) BETSEY Stevens [7A or 7B Clinical Dietitian]   Weekend/Holiday: Vocera - Weekend Clinical Dietitian

## 2024-03-11 NOTE — PROVIDER NOTIFICATION
7A [id number] Lopez  pt is having gas pain, can we get some simethicone ordered? Thanks, Margoth AMADOR, [phone]

## 2024-03-11 NOTE — PROVIDER NOTIFICATION
7A [id number] Lopez.   Can we get nicotine patches ordered for pt? He was requesting his chew. Thanks, Margoth AMADOR [phone]    Nicotine patches ordered

## 2024-03-11 NOTE — PROGRESS NOTES
Transplant Surgery  Inpatient Daily Progress Note  2024    Assessment & Plan: Mary Lopez is a 53 year old male with a history of EtOH cirrhosis, complicated by encephalopathy, hyponatremia, chronic thrombocytopenia, macrocytic anemia, with hospitalization (2024) for encephalopathy and suspected SBP, and recent admission -3/2 for Strep bovis bacteremia. He is now s/p a  donor liver transplant on 3/5/24 with biliary stent placement with Dr. Cortez. Pre-op MELD 31.    Liver transplant w/ stent: POD 6   LFTs downtrending except mild TB elevation, 1.4->1.5.  Jose Roberto BID. ASA 81mg daily  -Plan for HIDA today due to bile duct size mismatch (donor 5 mm, recipient 10 mm)     Immunosuppressed due to medications:   Induction: Solu-Medrol/pred taper  Maintenance:  MMF: 750 mg BID  Tac goal 6-8 renal spairing  Pred 5 while tac <8    Neurology:   Acute postoperative pain:  Fair control.  -Continue oxycodone PRN  -Add scheduled APAP  -Add lidoderm    Hematology:   Acute blood loss anemia: Received 10 units pRBCs post OR, last transfused 3/6. Hgb stable 8-9  Thrombocytopenia: Due to liver disease, Plts 59.    Cardiorespiratory:   Acute respiratory failure: Extubated in the OR, re-intubated shortly after transfer to the ICU. Extubated 3/8.  Maintaining sat on 1L NC. Pulm toilet, CDB/IS.  Bilateral pleural effusions: 3/7 CXR L pleural effusion stable, R pleural effusion slightly improved.  Atrial fib RVR: Onset 3/11 with rate 180s in setting of hypoK and hypoMg. Converted to NSR with metoprolol. Continue telemetry.    GI/Nutrition:   Severe malnutrition in the context of acute illness: Continue regular diet and tube feeds. Start huber counts 3/12.  Diarrhea: Bowel regimen changed to PRN.    Endocrine:   Insulin-dependent type 2 diabetes mellitus w/ steroid hyperglycemia: Continue insulin gtt. Add carb coverage. Advised patient to avoid high sugar foods like juice and  syrup.    Fluid/Electrolytes/Renal:   Hypokalemia: Replace and recheck.  Hypomagnesemia: Replace and recheck.  ANGEL, oliguric. Nephrology consulted, CRRT initiated 3/7-3/9. Now recovering. Cr 0.8.  Hypervolemia: Continue Bumex BID and lymphedema wraps.    : No acute issues.     Infectious disease: No acute issues.     Prophylaxis: DVT, fall, GI(PPI), fungal (Micafungin x14 days d/t CRRT)    Disposition: 7A, PT/OT      JOSELYN/Fellow/Resident Provider: Eugenie Pascal NP [id number]    Faculty: Benedicto Barrett MD     __________________________________________________________________    Interval History: History is obtained from the electronic health record and patient     Overnight events: AF RVR this morning, converted with metoprolol. Denies dyspnea or chest pain. Ongoing hallucinations per nursing. Generally oriented with confused statements. Breakfast noted to be very heavy in simple carbs (juice, italian ice, fruit cups, syrup). Discussed need to focus more on protein and avoid high sugar items with patient and family.    ROS:   A 10-point review of systems was negative except as noted above.    Curent Meds:   acetaminophen  650 mg Oral Q8H    [Held by provider] aspirin  325 mg Oral or Feeding Tube Daily    bumetanide  2 mg Intravenous BID    insulin aspart   Subcutaneous TID w/meals    lidocaine  2 patch Transdermal Q24H    magnesium oxide  400 mg Oral Daily    magnesium sulfate  2 g Intravenous Once    micafungin  100 mg Intravenous Daily    multivitamins w/minerals  15 mL Per Feeding Tube Daily    mycophenolate  750 mg Oral BID IS    Or    mycophenolate  750 mg Oral or NG Tube BID IS    pantoprazole  40 mg Per Feeding Tube QAM AC    polyethylene glycol  17 g Oral or Feeding Tube Daily    potassium chloride  40 mEq Oral Once    predniSONE  5 mg Oral Daily    protein modular  1 packet Per Feeding Tube 4x Daily    senna-docusate  1 tablet Oral or Feeding Tube BID    sodium chloride (PF)  3 mL Intravenous Q8H     "sulfamethoxazole-trimethoprim  1 tablet Oral or Feeding Tube QPM    tacrolimus  5 mg Oral BID IS    thiamine  100 mg Oral or Feeding Tube Daily    ursodiol  300 mg Oral BID    Or    ursodiol  300 mg Oral or NG Tube BID    valGANciclovir  450 mg Oral QPM    Or    valGANciclovir  450 mg Oral or NG Tube QPM       Physical Exam:     Admit Weight: 107.2 kg (236 lb 4.8 oz)    Current Vitals:   /74   Pulse 98   Temp 98.1  F (36.7  C) (Oral)   Resp 18   Ht 1.73 m (5' 8.11\")   Wt 102.4 kg (225 lb 11.2 oz)   SpO2 100%   BMI 34.21 kg/m      Vital sign ranges:    Temp:  [97.8  F (36.6  C)-98.3  F (36.8  C)] 98.1  F (36.7  C)  Pulse:  [] 98  Resp:  [18-20] 18  BP: (103-125)/(74-83) 118/74  SpO2:  [86 %-100 %] 100 %  Patient Vitals for the past 24 hrs:   BP Temp Temp src Pulse Resp SpO2 Weight   03/11/24 0743 -- -- -- 98 -- -- --   03/11/24 0738 118/74 -- -- (!) 167 -- 100 % --   03/11/24 0515 125/82 98.1  F (36.7  C) Oral 100 18 100 % --   03/11/24 0243 120/83 98  F (36.7  C) Oral 95 20 -- --   03/11/24 0156 -- -- -- -- -- -- 102.4 kg (225 lb 11.2 oz)   03/11/24 0110 -- -- -- -- -- 97 % --   03/11/24 0105 -- -- -- -- -- (!) 86 % --   03/10/24 1829 103/77 97.8  F (36.6  C) Oral 100 20 92 % --   03/10/24 1307 122/79 98.3  F (36.8  C) Oral 94 20 93 % --   03/10/24 1100 125/80 -- -- 98 20 91 % --   03/10/24 0900 -- -- -- -- -- -- 104.8 kg (231 lb 1.6 oz)     General Appearance: NAD  Skin: normal, warm  Heart: AF RVR-> NSR  Lungs: Diminished, 1L O2  Abdomen: Soft, appropriately tender, ND,  Incision CDI., drain sso  : no paris  Extremities: edema: bilaterally, +2 pitting wrapped  Neurologic: A&Ox4    Frailty Scores          12/19/2023   Frailty Scores   Final Score Frail   Final Score Number 4       Data:   CMP  Recent Labs   Lab 03/11/24  0631 03/11/24  0523 03/11/24  0420 03/10/24  0658 03/10/24  0656 03/09/24  0457 03/09/24  0405 03/08/24  2224 03/08/24  1943 03/06/24  0503 03/06/24  0500 03/04/24  1257 " 03/04/24  1058   NA  --   --  137  --  136   < > 137  --  138   < > 136   < > 129*   POTASSIUM  --   --  3.2*  --  3.2*   < > 4.1  --  4.2   < > 4.0   < > 4.3   CHLORIDE  --   --  103  --  103   < > 104  --  105   < > 104   < > 96*   CO2  --   --  29  --  27   < > 25  --  26   < > 25   < > 25   * 118* 150*   < > 176*   < > 222*   < > 153*   < > 154*   < > 392*   BUN  --   --  29.7*  --  30.3*   < > 30.0*  --  31.7*   < > 23.6*   < > 17.5   CR  --   --  0.79  --  0.91   < > 1.06  --  1.24*   < > 1.11   < > 1.08   GFRESTIMATED  --   --  >90  --  >90   < > 84  --  70   < > 80   < > 83   MEG  --   --  8.4*  --  8.4*   < > 7.9*  --  7.8*   < > 8.8   < > 8.7   ICAW  --   --   --   --   --   --  4.6  --  4.7   < > 5.2   < >  --    MAG  --   --  1.3*  --  1.8  --  2.2  --  2.3   < > 1.6*  --  1.1*   PHOS  --   --  2.3*  --  2.8  --  4.4  --  4.8*   < > 3.8  --  2.3*   AMYLASE  --   --   --   --   --   --   --   --   --   --  22*  --  10*   LIPASE  --   --   --   --   --   --   --   --   --   --  5*  --   --    ALBUMIN  --   --  2.7*  --  2.7*  --  3.1*  --  3.2*   < > 2.3*   < > 2.6*   BILITOTAL  --   --  1.5*  --  1.4*  --  1.7*  --   --    < > 2.6*   < > 12.7*   ALKPHOS  --   --  71  --  76  --  79  --   --    < > 47   < > 181*   AST  --   --  29  --  27  --  43  --   --    < > 310*   < >  --    ALT  --   --  50  --  66  --  103*  --   --    < > 294*   < > 50    < > = values in this interval not displayed.     CBC  Recent Labs   Lab 03/11/24  0420 03/10/24  0656   HGB 8.5* 8.4*   WBC 4.9 7.2   PLT 59* 64*     COAGS  Recent Labs   Lab 03/11/24  0420 03/10/24  0656 03/06/24  2211 03/06/24  1603 03/06/24  1233 03/06/24  0816   INR 1.18* 1.30*   < > 1.41*   < > 1.57*   PTT  --   --   --  27  --  30    < > = values in this interval not displayed.      Urinalysis  Recent Labs   Lab Test 03/04/24  1042 02/23/24  1558   COLOR Yellow Yellow   APPEARANCE Clear Clear   URINEGLC 500* Negative   URINEBILI Negative Small*    URINEKETONE Negative Negative   SG 1.007 1.009   UBLD Negative Negative   URINEPH 5.0 5.0   PROTEIN Negative Negative   NITRITE Negative Negative   LEUKEST Negative Negative   RBCU <1 <1   WBCU <1 <1     Virology:  Hepatitis C Antibody   Date Value Ref Range Status   03/04/2024 Nonreactive Nonreactive Final     Comment:     A nonreactive screening test result does not exclude the possibility of exposure to or infection with HCV. Nonreactive screening test results in individuals with prior exposure to HCV may be due to antibody levels below the limit of detection of this assay or lack of reactivity to the HCV antigens used in this assay. Patients with recent HCV infections (<3 months from time of exposure) may have false-negative HCV antibody results due to the time needed for seroconversion (average of 8 to 9 weeks).

## 2024-03-11 NOTE — PROGRESS NOTES
"/79 (BP Location: Right arm)   Pulse 94   Temp 97.8  F (36.6  C) (Oral)   Resp 16   Ht 1.73 m (5' 8.11\")   Wt 102.4 kg (225 lb 11.2 oz)   SpO2 97%   BMI 34.21 kg/m        8558-2421  VSS on RA. On tele. EKG completed, a fib noted. 2 doses of metoprolol given. Run of v tach noted this evening. Team aware, labs ordered. IV mag replacement given. Pt refused potassium replacement, team aware. Pt confused and making illogical statements, disoriented to time and situation. Clamshell incision stapled RON. TIMOTHY in place, team aware of high OP. Regular diet with carb coverage and huber counts, poor appetite. PO intake encouraged. NJ with continuous TF @GR 40 ml/hr with 30ml flushes q4. R HD line SL and L internal jugular-2 lumens SL and one TKO w/insulin gtt. 1 PIV SL. Abdominal pain treated with prn oxy x1 and scheduled tylenol. Up with 1-2 using GB and walker. On insulin gtt, Algorithm 4. BLE edema. OT attempted to wrap pts legs but pt declined. Pt down to nuclear medicine today for HIDA scan. Med card and lab book up to date. Business card given for transplant videos. Pt still needs to speak with specialty pharmacy. Will continue to monitor and notify team with changes.   "

## 2024-03-11 NOTE — PLAN OF CARE
"VS: /82 (BP Location: Right arm)   Pulse 100   Temp 98.1  F (36.7  C) (Oral)   Resp 18   Ht 1.73 m (5' 8.11\")   Wt 102.4 kg (225 lb 11.2 oz)   SpO2 100%   BMI 34.21 kg/m      Cares: 1900 - 0730     Neuro: Intermittently confused / intermittently disorientated to time and situation.   Cardiac: WDL  Respiratory: diminished breath sounds. On RA - 1L O2.   GI/: voiding adequately, x2 loose BM's this shift. Pt was continent all night !   Skin: clamshell abdominal incision stapled RON   Diet: Regular (poor appetite), TF @ 40mL/hr (GR) w/ FWF 30mL Q4H  Labs: awaiting AM lab results    BG: on insulin gtt Alg 4. Q1-2H checks (118 - 189)  LDA: NJ, Right internal jugular, Right temp. HD line, left and right PIV SL   Mobility: Ax1 GB and walker   Pain/Nausea: abdominal pain, denies nausea   PRN medications: oxy 10mg x1   Plan of Care: continue with current POC and update MD with any changes.   "

## 2024-03-11 NOTE — PLAN OF CARE
"/77 (BP Location: Right arm)   Pulse 100   Temp 97.8  F (36.6  C) (Oral)   Resp 20   Ht 1.73 m (5' 8.11\")   Wt 104.8 kg (231 lb 1.6 oz)   SpO2 92%   BMI 35.03 kg/m      Shift: [id number]  Isolation Status: Contact for VRE  VS: VSS on RA, afebrile  Neuro: Aox2. Disoriented to time and situaiton  Behaviors: Intermittent confusion. Lethargic to respond. Sleeping in between cares.  BG: Q1H. 101-201  Labs: WNL. Plt 62.  Respiratory: Diminished in bases  Cardiac: WDL  Pain/Nausea: Denies nausea.C/o abd pain  PRN: n/a  Diet: Regular, poor appetite. Encouraging patient to have meals  IV Access: L IJ. L and R PIV, SL. R HD cath line  Infusion(s): Insulin gtt, Alg 4. TKO for abx.  Lines/Drains: TIMOTHY, bloody output. 50ml. NJ to TF @ 40ml/hr goal  GI/: Intermittent incontinence. Voiding without difficulty. Loose BM x1  Skin: Clamshell incision, stapled RON. Bruising across chest and forearms  Mobility: Ax1-2 with GB and IV pole  Events/Education: n/a  Plan: Continue with plan of care and update MD of changes   "

## 2024-03-12 ENCOUNTER — TELEPHONE (OUTPATIENT)
Dept: TRANSPLANT | Facility: CLINIC | Age: 54
End: 2024-03-12

## 2024-03-12 ENCOUNTER — APPOINTMENT (OUTPATIENT)
Dept: PHYSICAL THERAPY | Facility: CLINIC | Age: 54
DRG: 005 | End: 2024-03-12
Attending: TRANSPLANT SURGERY
Payer: COMMERCIAL

## 2024-03-12 ENCOUNTER — APPOINTMENT (OUTPATIENT)
Dept: OCCUPATIONAL THERAPY | Facility: CLINIC | Age: 54
DRG: 005 | End: 2024-03-12
Attending: TRANSPLANT SURGERY
Payer: COMMERCIAL

## 2024-03-12 LAB
ALBUMIN SERPL BCG-MCNC: 2.4 G/DL (ref 3.5–5.2)
ALP SERPL-CCNC: 81 U/L (ref 40–150)
ALT SERPL W P-5'-P-CCNC: 49 U/L (ref 0–70)
ANION GAP SERPL CALCULATED.3IONS-SCNC: 4 MMOL/L (ref 7–15)
AST SERPL W P-5'-P-CCNC: 46 U/L (ref 0–45)
ATRIAL RATE - MUSE: NORMAL BPM
BACTERIA PRT CULT: NORMAL
BILIRUB SERPL-MCNC: 1.4 MG/DL
BUN SERPL-MCNC: 26.4 MG/DL (ref 6–20)
CALCIUM SERPL-MCNC: 8.3 MG/DL (ref 8.6–10)
CHLORIDE SERPL-SCNC: 102 MMOL/L (ref 98–107)
CREAT SERPL-MCNC: 0.78 MG/DL (ref 0.67–1.17)
DEPRECATED HCO3 PLAS-SCNC: 31 MMOL/L (ref 22–29)
DIASTOLIC BLOOD PRESSURE - MUSE: NORMAL MMHG
EGFRCR SERPLBLD CKD-EPI 2021: >90 ML/MIN/1.73M2
ERYTHROCYTE [DISTWIDTH] IN BLOOD BY AUTOMATED COUNT: 18.8 % (ref 10–15)
GLUCOSE BLDC GLUCOMTR-MCNC: 104 MG/DL (ref 70–99)
GLUCOSE BLDC GLUCOMTR-MCNC: 108 MG/DL (ref 70–99)
GLUCOSE BLDC GLUCOMTR-MCNC: 119 MG/DL (ref 70–99)
GLUCOSE BLDC GLUCOMTR-MCNC: 121 MG/DL (ref 70–99)
GLUCOSE BLDC GLUCOMTR-MCNC: 122 MG/DL (ref 70–99)
GLUCOSE BLDC GLUCOMTR-MCNC: 128 MG/DL (ref 70–99)
GLUCOSE BLDC GLUCOMTR-MCNC: 133 MG/DL (ref 70–99)
GLUCOSE BLDC GLUCOMTR-MCNC: 144 MG/DL (ref 70–99)
GLUCOSE BLDC GLUCOMTR-MCNC: 150 MG/DL (ref 70–99)
GLUCOSE BLDC GLUCOMTR-MCNC: 151 MG/DL (ref 70–99)
GLUCOSE BLDC GLUCOMTR-MCNC: 161 MG/DL (ref 70–99)
GLUCOSE BLDC GLUCOMTR-MCNC: 196 MG/DL (ref 70–99)
GLUCOSE BLDC GLUCOMTR-MCNC: 219 MG/DL (ref 70–99)
GLUCOSE BLDC GLUCOMTR-MCNC: 221 MG/DL (ref 70–99)
GLUCOSE BLDC GLUCOMTR-MCNC: 230 MG/DL (ref 70–99)
GLUCOSE BLDC GLUCOMTR-MCNC: 233 MG/DL (ref 70–99)
GLUCOSE BLDC GLUCOMTR-MCNC: 71 MG/DL (ref 70–99)
GLUCOSE BLDC GLUCOMTR-MCNC: 79 MG/DL (ref 70–99)
GLUCOSE BLDC GLUCOMTR-MCNC: 86 MG/DL (ref 70–99)
GLUCOSE BLDC GLUCOMTR-MCNC: 89 MG/DL (ref 70–99)
GLUCOSE SERPL-MCNC: 159 MG/DL (ref 70–99)
HCT VFR BLD AUTO: 26.7 % (ref 40–53)
HGB BLD-MCNC: 8.8 G/DL (ref 13.3–17.7)
INTERPRETATION ECG - MUSE: NORMAL
MAGNESIUM SERPL-MCNC: 1.6 MG/DL (ref 1.7–2.3)
MCH RBC QN AUTO: 31 PG (ref 26.5–33)
MCHC RBC AUTO-ENTMCNC: 33 G/DL (ref 31.5–36.5)
MCV RBC AUTO: 94 FL (ref 78–100)
P AXIS - MUSE: NORMAL DEGREES
PHOSPHATE SERPL-MCNC: 1.9 MG/DL (ref 2.5–4.5)
PLATELET # BLD AUTO: 73 10E3/UL (ref 150–450)
POTASSIUM SERPL-SCNC: 3.4 MMOL/L (ref 3.4–5.3)
POTASSIUM SERPL-SCNC: 3.6 MMOL/L (ref 3.4–5.3)
PR INTERVAL - MUSE: NORMAL MS
PROT SERPL-MCNC: 4.3 G/DL (ref 6.4–8.3)
QRS DURATION - MUSE: 80 MS
QT - MUSE: 278 MS
QTC - MUSE: 474 MS
R AXIS - MUSE: 5 DEGREES
RBC # BLD AUTO: 2.84 10E6/UL (ref 4.4–5.9)
SODIUM SERPL-SCNC: 137 MMOL/L (ref 135–145)
SYSTOLIC BLOOD PRESSURE - MUSE: NORMAL MMHG
T AXIS - MUSE: 207 DEGREES
TACROLIMUS BLD-MCNC: 7.1 UG/L (ref 5–15)
TME LAST DOSE: NORMAL H
TME LAST DOSE: NORMAL H
VENTRICULAR RATE- MUSE: 175 BPM
WBC # BLD AUTO: 4.3 10E3/UL (ref 4–11)

## 2024-03-12 PROCEDURE — 250N000011 HC RX IP 250 OP 636: Performed by: INTERNAL MEDICINE

## 2024-03-12 PROCEDURE — 83735 ASSAY OF MAGNESIUM: CPT | Performed by: NURSE PRACTITIONER

## 2024-03-12 PROCEDURE — 120N000011 HC R&B TRANSPLANT UMMC

## 2024-03-12 PROCEDURE — 80197 ASSAY OF TACROLIMUS: CPT | Performed by: NURSE PRACTITIONER

## 2024-03-12 PROCEDURE — 250N000012 HC RX MED GY IP 250 OP 636 PS 637: Performed by: NURSE PRACTITIONER

## 2024-03-12 PROCEDURE — 97530 THERAPEUTIC ACTIVITIES: CPT | Mod: GP

## 2024-03-12 PROCEDURE — 250N000011 HC RX IP 250 OP 636: Performed by: NURSE PRACTITIONER

## 2024-03-12 PROCEDURE — 85027 COMPLETE CBC AUTOMATED: CPT | Performed by: NURSE PRACTITIONER

## 2024-03-12 PROCEDURE — 250N000013 HC RX MED GY IP 250 OP 250 PS 637: Performed by: NURSE PRACTITIONER

## 2024-03-12 PROCEDURE — 36592 COLLECT BLOOD FROM PICC: CPT | Performed by: NURSE PRACTITIONER

## 2024-03-12 PROCEDURE — 84100 ASSAY OF PHOSPHORUS: CPT | Performed by: NURSE PRACTITIONER

## 2024-03-12 PROCEDURE — 80053 COMPREHEN METABOLIC PANEL: CPT

## 2024-03-12 PROCEDURE — 250N000011 HC RX IP 250 OP 636: Mod: JZ | Performed by: NURSE PRACTITIONER

## 2024-03-12 PROCEDURE — 82247 BILIRUBIN TOTAL: CPT | Performed by: NURSE PRACTITIONER

## 2024-03-12 PROCEDURE — 99232 SBSQ HOSP IP/OBS MODERATE 35: CPT | Mod: 24 | Performed by: NURSE PRACTITIONER

## 2024-03-12 PROCEDURE — 97535 SELF CARE MNGMENT TRAINING: CPT | Mod: GO

## 2024-03-12 PROCEDURE — 258N000003 HC RX IP 258 OP 636: Performed by: NURSE PRACTITIONER

## 2024-03-12 PROCEDURE — 250N000013 HC RX MED GY IP 250 OP 250 PS 637: Performed by: TRANSPLANT SURGERY

## 2024-03-12 PROCEDURE — 36415 COLL VENOUS BLD VENIPUNCTURE: CPT

## 2024-03-12 PROCEDURE — 97116 GAIT TRAINING THERAPY: CPT | Mod: GP

## 2024-03-12 PROCEDURE — 250N000009 HC RX 250: Performed by: NURSE PRACTITIONER

## 2024-03-12 RX ORDER — MAGNESIUM OXIDE 400 MG/1
800 TABLET ORAL DAILY
Status: DISCONTINUED | OUTPATIENT
Start: 2024-03-12 | End: 2024-03-13

## 2024-03-12 RX ORDER — POTASSIUM CHLORIDE 750 MG/1
20 TABLET, EXTENDED RELEASE ORAL 2 TIMES DAILY
Status: DISCONTINUED | OUTPATIENT
Start: 2024-03-12 | End: 2024-03-13

## 2024-03-12 RX ORDER — MAGNESIUM SULFATE HEPTAHYDRATE 40 MG/ML
2 INJECTION, SOLUTION INTRAVENOUS ONCE
Status: COMPLETED | OUTPATIENT
Start: 2024-03-12 | End: 2024-03-12

## 2024-03-12 RX ORDER — OXYCODONE HYDROCHLORIDE 5 MG/1
5 TABLET ORAL EVERY 4 HOURS PRN
Status: DISCONTINUED | OUTPATIENT
Start: 2024-03-12 | End: 2024-03-13

## 2024-03-12 RX ADMIN — Medication 1 TABLET: at 10:05

## 2024-03-12 RX ADMIN — ACETAMINOPHEN 650 MG: 325 TABLET, FILM COATED ORAL at 00:36

## 2024-03-12 RX ADMIN — TACROLIMUS 5 MG: 5 CAPSULE ORAL at 18:35

## 2024-03-12 RX ADMIN — INSULIN HUMAN 6 UNITS/HR: 1 INJECTION, SOLUTION INTRAVENOUS at 09:09

## 2024-03-12 RX ADMIN — ACETAMINOPHEN 650 MG: 325 TABLET, FILM COATED ORAL at 10:06

## 2024-03-12 RX ADMIN — SULFAMETHOXAZOLE AND TRIMETHOPRIM 1 TABLET: 400; 80 TABLET ORAL at 20:40

## 2024-03-12 RX ADMIN — ACETAMINOPHEN 650 MG: 325 TABLET, FILM COATED ORAL at 15:01

## 2024-03-12 RX ADMIN — THIAMINE HCL TAB 100 MG 100 MG: 100 TAB at 10:06

## 2024-03-12 RX ADMIN — INSULIN DEGLUDEC INJECTION 36 UNITS: 100 INJECTION, SOLUTION SUBCUTANEOUS at 10:11

## 2024-03-12 RX ADMIN — VALGANCICLOVIR 450 MG: 450 TABLET, FILM COATED ORAL at 20:40

## 2024-03-12 RX ADMIN — PANTOPRAZOLE SODIUM 40 MG: 40 TABLET, DELAYED RELEASE ORAL at 10:06

## 2024-03-12 RX ADMIN — LIDOCAINE 2 PATCH: 4 PATCH TOPICAL at 10:14

## 2024-03-12 RX ADMIN — MAGNESIUM SULFATE IN WATER 2 G: 40 INJECTION, SOLUTION INTRAVENOUS at 07:11

## 2024-03-12 RX ADMIN — SODIUM PHOSPHATE, DIBASIC, ANHYDROUS, POTASSIUM PHOSPHATE, MONOBASIC, AND SODIUM PHOSPHATE, MONOBASIC, MONOHYDRATE 250 MG: 852; 155; 130 TABLET, COATED ORAL at 15:01

## 2024-03-12 RX ADMIN — MYCOPHENOLATE MOFETIL 750 MG: 250 CAPSULE ORAL at 10:12

## 2024-03-12 RX ADMIN — TACROLIMUS 5 MG: 5 CAPSULE ORAL at 10:05

## 2024-03-12 RX ADMIN — OXYCODONE HYDROCHLORIDE 2.5 MG: 5 TABLET ORAL at 22:10

## 2024-03-12 RX ADMIN — BUMETANIDE 2 MG: 0.25 INJECTION INTRAMUSCULAR; INTRAVENOUS at 10:09

## 2024-03-12 RX ADMIN — Medication 1 PACKET: at 10:13

## 2024-03-12 RX ADMIN — POLYETHYLENE GLYCOL 3350 17 G: 17 POWDER, FOR SOLUTION ORAL at 10:06

## 2024-03-12 RX ADMIN — SENNOSIDES AND DOCUSATE SODIUM 1 TABLET: 8.6; 5 TABLET ORAL at 10:06

## 2024-03-12 RX ADMIN — MICAFUNGIN SODIUM 100 MG: 50 INJECTION, POWDER, LYOPHILIZED, FOR SOLUTION INTRAVENOUS at 10:59

## 2024-03-12 RX ADMIN — PREDNISONE 5 MG: 5 TABLET ORAL at 10:06

## 2024-03-12 RX ADMIN — BUMETANIDE 2 MG: 0.25 INJECTION INTRAMUSCULAR; INTRAVENOUS at 16:26

## 2024-03-12 RX ADMIN — POTASSIUM CHLORIDE 20 MEQ: 750 TABLET, EXTENDED RELEASE ORAL at 10:05

## 2024-03-12 RX ADMIN — OXYCODONE HYDROCHLORIDE 5 MG: 5 TABLET ORAL at 08:57

## 2024-03-12 RX ADMIN — Medication 1 PACKET: at 20:59

## 2024-03-12 RX ADMIN — POTASSIUM CHLORIDE 20 MEQ: 750 TABLET, EXTENDED RELEASE ORAL at 20:40

## 2024-03-12 RX ADMIN — SENNOSIDES AND DOCUSATE SODIUM 1 TABLET: 8.6; 5 TABLET ORAL at 20:40

## 2024-03-12 RX ADMIN — SODIUM PHOSPHATE, DIBASIC, ANHYDROUS, POTASSIUM PHOSPHATE, MONOBASIC, AND SODIUM PHOSPHATE, MONOBASIC, MONOHYDRATE 250 MG: 852; 155; 130 TABLET, COATED ORAL at 10:27

## 2024-03-12 RX ADMIN — MYCOPHENOLATE MOFETIL 750 MG: 250 CAPSULE ORAL at 18:35

## 2024-03-12 RX ADMIN — URSODIOL 300 MG: 300 CAPSULE ORAL at 20:40

## 2024-03-12 RX ADMIN — MAGNESIUM OXIDE TAB 400 MG (241.3 MG ELEMENTAL MG) 800 MG: 400 (241.3 MG) TAB at 10:05

## 2024-03-12 RX ADMIN — URSODIOL 300 MG: 300 CAPSULE ORAL at 10:06

## 2024-03-12 RX ADMIN — Medication 25 MG: at 22:10

## 2024-03-12 ASSESSMENT — ACTIVITIES OF DAILY LIVING (ADL)
ADLS_ACUITY_SCORE: 34

## 2024-03-12 NOTE — PROGRESS NOTES
CLINICAL NUTRITION SERVICES - BRIEF NOTE     Nutrition Prescription    Recommendations already ordered by Registered Dietitian (RD):  TwoCal HN @ 75 ml/hr x 12 hours (900 ml/day) + 2 pkts Prosource [id number]   Provides 1960 kcal (25 kcal/kg), 116 grams protein (1.5g/kg), 630 ml free water, 197g CHO, 5g fiber     Strawberry Glucerna with meals TID    Calorie counts 3/12-3/15.        EVALUATION OF THE PROGRESS TOWARD GOALS   Diet: Regular  Nutrition Support: TwoCal HN @ 40 ml/hr continuous + 2 pkt Prosource TF     NEW FINDINGS   On insulin gtt -> team would like to transition TF and then switch to subcutaneous insulin.     INTERVENTIONS  Team requesting transition to cycled TF regimen.  See adjustments above.     Monitoring/Evaluation  Progress toward goals will be monitored and evaluated per protocol.     Fabi Bryan, MS, RD, LD, CCTD, CNSC  7A and 7B beds [phone], pager [phone]  Weekend pager [phone]

## 2024-03-12 NOTE — PROGRESS NOTES
Transplant Surgery  Inpatient Daily Progress Note  2024    Assessment & Plan: Mary Lopez is a 53 year old male with a history of EtOH cirrhosis, complicated by encephalopathy, hyponatremia, chronic thrombocytopenia, macrocytic anemia, with hospitalization (2024) for encephalopathy and suspected SBP, and recent admission -3/2 for Strep bovis bacteremia. He is now s/p a  donor liver transplant on 3/5/24 with biliary stent placement with Dr. Cortez. Pre-op MELD 31.    Liver transplant w/ stent: POD 7   LFTs downtrending except mild TB elevation, 1.5->1.4. 3/11 HIDA negative for leak. Jose Roberto BID. ASA 325mg daily.    Immunosuppressed due to medications:   Induction: Solu-Medrol/pred taper  Maintenance:  MMF: 750 mg BID  Tac goal 8-10  Pred 5 while tac <8    Neurology:   Acute postoperative pain:  Fair control.  -Oxycodone, start weaning. Concern for opioid-induced hyperalgesia.  -Scheduled APAP  -Lidoderm  Delirium: Promote day/night sleep pattern.    Hematology:   Acute blood loss anemia: Received 10 units pRBCs post OR, last transfused 3/6. Hgb stable 8-9  Thrombocytopenia: Due to liver disease, Plts 73.    Cardiorespiratory:   Acute respiratory failure: Extubated in the OR, re-intubated shortly after transfer to the ICU. Extubated 3/8. Pulm toilet, CDB/IS. Now resolved.  Bilateral pleural effusions: 3/7 CXR L pleural effusion stable, R pleural effusion slightly improved.  Suspected JAYLIN: Sleep study previously recommended to pt. STOP-BANG score 6/8 in 10/2023. Requiring O2 overnight only.  Atrial fib RVR: Onset 3/11 with rate 180s in setting of hypoK and hypoMg. Converted to NSR with metoprolol. Continue telemetry.  Wide complex tachycardia: VT vs AF with aberrant conduction. Report of 17 beats on 3/12. Asymptomatic. Continue telemetry.    GI/Nutrition:   Severe malnutrition in the context of acute illness: Continue regular diet and tube feeds. Start huber counts.  Diarrhea: Bowel  regimen changed to PRN.    Endocrine:   Insulin-dependent type 2 diabetes mellitus w/ steroid hyperglycemia: Continue insulin gtt and carb coverage (not given yesterday). Add home dose degludec 36u today. May need NPH with TF.    Fluid/Electrolytes/Renal:   Hypokalemia: Start KCl 20meq BID while on Bumex.  Hypomagnesemia: Increase MagOx and give 2g mag sulf.  ANGEL, oliguric. Nephrology consulted, CRRT initiated 3/7-3/9. Now recovering. Cr 0.8. Dialysis line removed today.  Hypervolemia: Continue Bumex BID and lymphedema wraps.  Hypophosphatemia: Replace today.    : No acute issues.     Infectious disease: No acute issues.     Prophylaxis: DVT, fall, GI (PPI), fungal (Micafungin x14 days d/t CRRT)    Disposition: 7A, PT/OT      JOSELYN/Fellow/Resident Provider: Eugenie Pascal NP [id number]    Faculty: Benedicto Barrett MD     __________________________________________________________________    Interval History: History is obtained from the electronic health record and patient     Overnight events: Pt remains oriented but delirious. C/o skin pain with light touch. 17 beats of wide-complex tachycardia overnight, asymptomatic. Hallucinations.    ROS:   A 10-point review of systems was negative except as noted above.    Curent Meds:   acetaminophen  650 mg Oral Q8H    [Held by provider] aspirin  325 mg Oral or Feeding Tube Daily    bumetanide  2 mg Intravenous BID    insulin aspart   Subcutaneous TID w/meals    insulin degludec  36 Units Subcutaneous Daily    lidocaine  2 patch Transdermal Q24H    magnesium oxide  800 mg Oral Daily    micafungin  100 mg Intravenous Daily    multivitamin w/minerals  1 tablet Oral Daily    mycophenolate  750 mg Oral BID IS    pantoprazole  40 mg Oral QAM AC    phosphorus tablet 250 mg  250 mg Oral BID    polyethylene glycol  17 g Oral or Feeding Tube Daily    potassium chloride  20 mEq Oral BID    predniSONE  5 mg Oral Daily    protein modular  1 packet Per Feeding Tube BID    senna-docusate  1 tablet  "Oral or Feeding Tube BID    sodium chloride (PF)  3 mL Intravenous Q8H    sulfamethoxazole-trimethoprim  1 tablet Oral or Feeding Tube QPM    tacrolimus  5 mg Oral BID IS    thiamine  100 mg Oral or Feeding Tube Daily    ursodiol  300 mg Oral BID    valGANciclovir  450 mg Oral QPM       Physical Exam:     Admit Weight: 107.2 kg (236 lb 4.8 oz)    Current Vitals:   /76   Pulse 92   Temp 98.1  F (36.7  C)   Resp 16   Ht 1.73 m (5' 8.11\")   Wt 99.3 kg (219 lb)   SpO2 97%   BMI 33.19 kg/m      Vital sign ranges:    Temp:  [98.1  F (36.7  C)-98.3  F (36.8  C)] 98.1  F (36.7  C)  Pulse:  [87-94] 92  Resp:  [16] 16  BP: (121-133)/(71-77) 121/76  SpO2:  [88 %-99 %] 97 %  Patient Vitals for the past 24 hrs:   BP Temp Temp src Pulse Resp SpO2 Weight   03/12/24 0956 121/76 98.1  F (36.7  C) -- 92 16 97 % --   03/12/24 0528 124/77 98.3  F (36.8  C) Oral 94 16 98 % --   03/12/24 0219 -- -- -- -- -- 99 % --   03/12/24 0211 -- -- -- -- -- (!) 88 % 99.3 kg (219 lb)   03/12/24 0107 133/71 98.1  F (36.7  C) Oral 89 16 96 % --   03/11/24 2226 129/72 98.3  F (36.8  C) Oral 87 16 98 % --   03/11/24 1132 -- -- -- -- -- 97 % --     General Appearance: NAD  Skin: normal, warm  Heart: NSR  Lungs: Unlabored, RA  Abdomen: Soft, appropriately tender, ND,  Incision CDI., drain ? Clogged - stripped  : no paris  Extremities: edema: bilaterally, +2 pitting wrapped  Neurologic: A&Ox4 with hallucinations    Frailty Scores          12/19/2023   Frailty Scores   Final Score Frail   Final Score Number 4       Data:   CMP  Recent Labs   Lab 03/12/24  0955 03/12/24  0654 03/12/24  0501 03/12/24  0438 03/12/24  0319 03/12/24  0254 03/11/24  1821 03/11/24  1738 03/11/24  0523 03/11/24  0420 03/09/24  0457 03/09/24  0405 03/06/24  0503 03/06/24  0500   NA  --   --   --  137  --   --   --  136  --  137   < > 137   < > 136   POTASSIUM  --   --   --  3.6  --  3.4  --  3.3*   < > 3.2*   < > 4.1   < > 4.0   CHLORIDE  --   --   --  102  --   --   " --  99  --  103   < > 104   < > 104   CO2  --   --   --  31*  --   --   --  31*  --  29   < > 25   < > 25   * 150*   < > 159*   < >  --    < > 136*   < > 150*   < > 222*   < > 154*   BUN  --   --   --  26.4*  --   --   --  26.8*  --  29.7*   < > 30.0*   < > 23.6*   CR  --   --   --  0.78  --   --   --  0.76  --  0.79   < > 1.06   < > 1.11   GFRESTIMATED  --   --   --  >90  --   --   --  >90  --  >90   < > 84   < > 80   MEG  --   --   --  8.3*  --   --   --  8.5*  --  8.4*   < > 7.9*   < > 8.8   ICAW  --   --   --   --   --   --   --  4.9  --   --   --  4.6   < > 5.2   MAG  --   --   --  1.6*  --   --   --  1.8   < > 1.3*   < > 2.2   < > 1.6*   PHOS  --   --   --  1.9*  --   --   --  2.2*  --  2.3*   < > 4.4   < > 3.8   AMYLASE  --   --   --   --   --   --   --   --   --   --   --   --   --  22*   LIPASE  --   --   --   --   --   --   --   --   --   --   --   --   --  5*   ALBUMIN  --   --   --  2.4*  --   --   --   --   --  2.7*   < > 3.1*   < > 2.3*   BILITOTAL  --   --   --  1.4*  --   --   --   --   --  1.5*   < > 1.7*   < > 2.6*   ALKPHOS  --   --   --  81  --   --   --   --   --  71   < > 79   < > 47   AST  --   --   --  46*  --   --   --   --   --  29   < > 43   < > 310*   ALT  --   --   --  49  --   --   --   --   --  50   < > 103*   < > 294*    < > = values in this interval not displayed.     CBC  Recent Labs   Lab 03/12/24  0438 03/11/24  0420   HGB 8.8* 8.5*   WBC 4.3 4.9   PLT 73* 59*     COAGS  Recent Labs   Lab 03/11/24  0420 03/10/24  0656 03/06/24  2211 03/06/24  1603 03/06/24  1233 03/06/24  0816   INR 1.18* 1.30*   < > 1.41*   < > 1.57*   PTT  --   --   --  27  --  30    < > = values in this interval not displayed.      Urinalysis  Recent Labs   Lab Test 03/04/24  1042 02/23/24  1558   COLOR Yellow Yellow   APPEARANCE Clear Clear   URINEGLC 500* Negative   URINEBILI Negative Small*   URINEKETONE Negative Negative   SG 1.007 1.009   UBLD Negative Negative   URINEPH 5.0 5.0   PROTEIN Negative  Negative   NITRITE Negative Negative   LEUKEST Negative Negative   RBCU <1 <1   WBCU <1 <1     Virology:  Hepatitis C Antibody   Date Value Ref Range Status   03/04/2024 Nonreactive Nonreactive Final     Comment:     A nonreactive screening test result does not exclude the possibility of exposure to or infection with HCV. Nonreactive screening test results in individuals with prior exposure to HCV may be due to antibody levels below the limit of detection of this assay or lack of reactivity to the HCV antigens used in this assay. Patients with recent HCV infections (<3 months from time of exposure) may have false-negative HCV antibody results due to the time needed for seroconversion (average of 8 to 9 weeks).

## 2024-03-12 NOTE — PLAN OF CARE
"Goal Outcome Evaluation:      Plan of Care Reviewed With: patient    Overall Patient Progress: improving  /77 (BP Location: Left arm)   Pulse 94   Temp 98.3  F (36.8  C) (Oral)   Resp 16   Ht 1.73 m (5' 8.11\")   Wt 99.3 kg (219 lb)   SpO2 98%   BMI 33.19 kg/m      Shift: 9977-8780  Isolation Status: Contacts  VS: stable on RA, afebrile  Neuro: Aox2, confused, illogical comments  Behaviors:   BG: insulin gtt, on algorithm 4, Q1H range 108-160  Labs/Imaging: K: 3.4, 3.6  Pain/Nausea: Minimal pain,denies pain  PRN: oxycodone x1  Diet: regular, TF @ 40mL/hr  IV Access: PIV, L IJ  Infusion(s): Magnesium sulfate, insulin gtt  Lines/Drains: TIMOTHY leaky site, dressing changed, 350mL bright red output  GI/: 3 loose BM's over night, voiding  Skin: clamshell incision, R TIMOTHY drain   Mobility: up with 2, gb and walker  Plan: continue with plan of care, will notify MD with any changes            "

## 2024-03-12 NOTE — PLAN OF CARE
"/73 (BP Location: Right arm)   Pulse 103   Temp 98.1  F (36.7  C) (Oral)   Resp 16   Ht 1.73 m (5' 8.11\")   Wt 99.3 kg (219 lb)   SpO2 96%   BMI 33.19 kg/m      Shift: 2822-1129  Isolation Status: contact for VRE  VS: stable on room air, afebrile  Neuro: Aox2  Behaviors: calm, cooperative, confused  BG: hourly. Insulin drip Alg #4  Labs: ALT=103, AST=46, creat=1.06  Respiratory: WDL  Cardiac: WDL  Pain/Nausea: abdominal pain, denies nausea  PRN: oxycodone x1   Diet: Regular  IV Access: Right internal jugular,  right and left PIV  Infusion(s): insulin with NS carrier, TKO.   Lines/Drains: NJ with enteral feedings at 40mL/hour. RLQ TIMOTHY to bulb suction.  GI/: No BM. Voiding. Urgency with bowel and bladder. Bumex injection administered x2  Skin: generalized bruising  Mobility: Ax2 + gait belt + walker or IV pole. Bed alarm on. Sitter bedside  Plan: Continue plan of care          "

## 2024-03-13 ENCOUNTER — APPOINTMENT (OUTPATIENT)
Dept: OCCUPATIONAL THERAPY | Facility: CLINIC | Age: 54
DRG: 005 | End: 2024-03-13
Attending: TRANSPLANT SURGERY
Payer: COMMERCIAL

## 2024-03-13 ENCOUNTER — APPOINTMENT (OUTPATIENT)
Dept: PHYSICAL THERAPY | Facility: CLINIC | Age: 54
DRG: 005 | End: 2024-03-13
Attending: TRANSPLANT SURGERY
Payer: COMMERCIAL

## 2024-03-13 LAB
ACANTHOCYTES BLD QL SMEAR: NORMAL
ALBUMIN SERPL BCG-MCNC: 2.8 G/DL (ref 3.5–5.2)
ALP SERPL-CCNC: 110 U/L (ref 40–150)
ALT SERPL W P-5'-P-CCNC: 81 U/L (ref 0–70)
ANION GAP SERPL CALCULATED.3IONS-SCNC: 7 MMOL/L (ref 7–15)
AST SERPL W P-5'-P-CCNC: 76 U/L (ref 0–45)
ATRIAL RATE - MUSE: 93 BPM
AUER BODIES BLD QL SMEAR: NORMAL
BASO STIPL BLD QL SMEAR: NORMAL
BILIRUB SERPL-MCNC: 1.6 MG/DL
BITE CELLS BLD QL SMEAR: NORMAL
BLISTER CELLS BLD QL SMEAR: NORMAL
BUN SERPL-MCNC: 22.2 MG/DL (ref 6–20)
BURR CELLS BLD QL SMEAR: NORMAL
CALCIUM SERPL-MCNC: 8.6 MG/DL (ref 8.6–10)
CHLORIDE SERPL-SCNC: 101 MMOL/L (ref 98–107)
CREAT SERPL-MCNC: 0.76 MG/DL (ref 0.67–1.17)
DACRYOCYTES BLD QL SMEAR: NORMAL
DEPRECATED HCO3 PLAS-SCNC: 29 MMOL/L (ref 22–29)
DIASTOLIC BLOOD PRESSURE - MUSE: NORMAL MMHG
EGFRCR SERPLBLD CKD-EPI 2021: >90 ML/MIN/1.73M2
ELLIPTOCYTES BLD QL SMEAR: NORMAL
ERYTHROCYTE [DISTWIDTH] IN BLOOD BY AUTOMATED COUNT: 20.1 % (ref 10–15)
FRAGMENTS BLD QL SMEAR: NORMAL
GLUCOSE BLDC GLUCOMTR-MCNC: 109 MG/DL (ref 70–99)
GLUCOSE BLDC GLUCOMTR-MCNC: 109 MG/DL (ref 70–99)
GLUCOSE BLDC GLUCOMTR-MCNC: 124 MG/DL (ref 70–99)
GLUCOSE BLDC GLUCOMTR-MCNC: 126 MG/DL (ref 70–99)
GLUCOSE BLDC GLUCOMTR-MCNC: 133 MG/DL (ref 70–99)
GLUCOSE BLDC GLUCOMTR-MCNC: 134 MG/DL (ref 70–99)
GLUCOSE BLDC GLUCOMTR-MCNC: 146 MG/DL (ref 70–99)
GLUCOSE BLDC GLUCOMTR-MCNC: 146 MG/DL (ref 70–99)
GLUCOSE BLDC GLUCOMTR-MCNC: 149 MG/DL (ref 70–99)
GLUCOSE BLDC GLUCOMTR-MCNC: 149 MG/DL (ref 70–99)
GLUCOSE BLDC GLUCOMTR-MCNC: 150 MG/DL (ref 70–99)
GLUCOSE BLDC GLUCOMTR-MCNC: 157 MG/DL (ref 70–99)
GLUCOSE BLDC GLUCOMTR-MCNC: 172 MG/DL (ref 70–99)
GLUCOSE BLDC GLUCOMTR-MCNC: 172 MG/DL (ref 70–99)
GLUCOSE BLDC GLUCOMTR-MCNC: 178 MG/DL (ref 70–99)
GLUCOSE BLDC GLUCOMTR-MCNC: 187 MG/DL (ref 70–99)
GLUCOSE BLDC GLUCOMTR-MCNC: 196 MG/DL (ref 70–99)
GLUCOSE BLDC GLUCOMTR-MCNC: 212 MG/DL (ref 70–99)
GLUCOSE BLDC GLUCOMTR-MCNC: 215 MG/DL (ref 70–99)
GLUCOSE BLDC GLUCOMTR-MCNC: 91 MG/DL (ref 70–99)
GLUCOSE SERPL-MCNC: 193 MG/DL (ref 70–99)
HCT VFR BLD AUTO: 29.2 % (ref 40–53)
HGB BLD-MCNC: 9.6 G/DL (ref 13.3–17.7)
HGB C CRYSTALS: NORMAL
HOWELL-JOLLY BOD BLD QL SMEAR: NORMAL
INTERPRETATION ECG - MUSE: NORMAL
MAGNESIUM SERPL-MCNC: 1.5 MG/DL (ref 1.7–2.3)
MCH RBC QN AUTO: 31.5 PG (ref 26.5–33)
MCHC RBC AUTO-ENTMCNC: 32.9 G/DL (ref 31.5–36.5)
MCV RBC AUTO: 96 FL (ref 78–100)
NEUTS HYPERSEG BLD QL SMEAR: NORMAL
P AXIS - MUSE: 33 DEGREES
PHOSPHATE SERPL-MCNC: 2.3 MG/DL (ref 2.5–4.5)
PLAT MORPH BLD: NORMAL
PLATELET # BLD AUTO: 111 10E3/UL (ref 150–450)
POLYCHROMASIA BLD QL SMEAR: NORMAL
POTASSIUM SERPL-SCNC: 4.1 MMOL/L (ref 3.4–5.3)
PR INTERVAL - MUSE: 136 MS
PROT SERPL-MCNC: 4.9 G/DL (ref 6.4–8.3)
QRS DURATION - MUSE: 82 MS
QT - MUSE: 358 MS
QTC - MUSE: 445 MS
R AXIS - MUSE: -19 DEGREES
RBC # BLD AUTO: 3.05 10E6/UL (ref 4.4–5.9)
RBC AGGLUT BLD QL: NORMAL
RBC MORPH BLD: NORMAL
ROULEAUX BLD QL SMEAR: NORMAL
SICKLE CELLS BLD QL SMEAR: NORMAL
SMUDGE CELLS BLD QL SMEAR: NORMAL
SODIUM SERPL-SCNC: 137 MMOL/L (ref 135–145)
SPHEROCYTES BLD QL SMEAR: NORMAL
STOMATOCYTES BLD QL SMEAR: NORMAL
SYSTOLIC BLOOD PRESSURE - MUSE: NORMAL MMHG
T AXIS - MUSE: 36 DEGREES
TACROLIMUS BLD-MCNC: 5.8 UG/L (ref 5–15)
TARGETS BLD QL SMEAR: NORMAL
TME LAST DOSE: NORMAL H
TME LAST DOSE: NORMAL H
TOXIC GRANULES BLD QL SMEAR: NORMAL
VARIANT LYMPHS BLD QL SMEAR: NORMAL
VENTRICULAR RATE- MUSE: 93 BPM
WBC # BLD AUTO: 6 10E3/UL (ref 4–11)

## 2024-03-13 PROCEDURE — 250N000013 HC RX MED GY IP 250 OP 250 PS 637: Performed by: NURSE PRACTITIONER

## 2024-03-13 PROCEDURE — 250N000012 HC RX MED GY IP 250 OP 636 PS 637

## 2024-03-13 PROCEDURE — 97535 SELF CARE MNGMENT TRAINING: CPT | Mod: GO

## 2024-03-13 PROCEDURE — 250N000009 HC RX 250: Performed by: NURSE PRACTITIONER

## 2024-03-13 PROCEDURE — 99232 SBSQ HOSP IP/OBS MODERATE 35: CPT | Mod: 24 | Performed by: NURSE PRACTITIONER

## 2024-03-13 PROCEDURE — 80053 COMPREHEN METABOLIC PANEL: CPT | Performed by: NURSE PRACTITIONER

## 2024-03-13 PROCEDURE — 258N000003 HC RX IP 258 OP 636: Performed by: NURSE PRACTITIONER

## 2024-03-13 PROCEDURE — 250N000011 HC RX IP 250 OP 636: Mod: JZ | Performed by: NURSE PRACTITIONER

## 2024-03-13 PROCEDURE — 250N000013 HC RX MED GY IP 250 OP 250 PS 637

## 2024-03-13 PROCEDURE — 85027 COMPLETE CBC AUTOMATED: CPT | Performed by: NURSE PRACTITIONER

## 2024-03-13 PROCEDURE — 250N000012 HC RX MED GY IP 250 OP 636 PS 637: Performed by: NURSE PRACTITIONER

## 2024-03-13 PROCEDURE — 97530 THERAPEUTIC ACTIVITIES: CPT | Mod: GP

## 2024-03-13 PROCEDURE — 80197 ASSAY OF TACROLIMUS: CPT | Performed by: NURSE PRACTITIONER

## 2024-03-13 PROCEDURE — 93005 ELECTROCARDIOGRAM TRACING: CPT

## 2024-03-13 PROCEDURE — 93010 ELECTROCARDIOGRAM REPORT: CPT | Performed by: INTERNAL MEDICINE

## 2024-03-13 PROCEDURE — 250N000011 HC RX IP 250 OP 636: Performed by: NURSE PRACTITIONER

## 2024-03-13 PROCEDURE — 120N000011 HC R&B TRANSPLANT UMMC

## 2024-03-13 PROCEDURE — 36592 COLLECT BLOOD FROM PICC: CPT | Performed by: NURSE PRACTITIONER

## 2024-03-13 PROCEDURE — 99223 1ST HOSP IP/OBS HIGH 75: CPT

## 2024-03-13 PROCEDURE — 97116 GAIT TRAINING THERAPY: CPT | Mod: GP

## 2024-03-13 PROCEDURE — 84100 ASSAY OF PHOSPHORUS: CPT | Performed by: NURSE PRACTITIONER

## 2024-03-13 PROCEDURE — 83735 ASSAY OF MAGNESIUM: CPT | Performed by: NURSE PRACTITIONER

## 2024-03-13 PROCEDURE — 250N000013 HC RX MED GY IP 250 OP 250 PS 637: Performed by: TRANSPLANT SURGERY

## 2024-03-13 RX ORDER — MYCOPHENOLATE MOFETIL 250 MG/1
750 CAPSULE ORAL
Status: DISCONTINUED | OUTPATIENT
Start: 2024-03-14 | End: 2024-03-13

## 2024-03-13 RX ORDER — POLYETHYLENE GLYCOL 3350 17 G/17G
17 POWDER, FOR SOLUTION ORAL DAILY PRN
Status: DISCONTINUED | OUTPATIENT
Start: 2024-03-13 | End: 2024-03-21 | Stop reason: HOSPADM

## 2024-03-13 RX ORDER — MYCOPHENOLATE MOFETIL 250 MG/1
750 CAPSULE ORAL
Status: DISCONTINUED | OUTPATIENT
Start: 2024-03-14 | End: 2024-03-21 | Stop reason: HOSPADM

## 2024-03-13 RX ORDER — MAGNESIUM OXIDE 400 MG/1
800 TABLET ORAL 2 TIMES DAILY
Status: DISCONTINUED | OUTPATIENT
Start: 2024-03-13 | End: 2024-03-21 | Stop reason: HOSPADM

## 2024-03-13 RX ORDER — BUMETANIDE 2 MG/1
2 TABLET ORAL
Status: DISCONTINUED | OUTPATIENT
Start: 2024-03-13 | End: 2024-03-21

## 2024-03-13 RX ORDER — URSODIOL 300 MG/1
300 CAPSULE ORAL 2 TIMES DAILY
Status: DISCONTINUED | OUTPATIENT
Start: 2024-03-13 | End: 2024-03-21 | Stop reason: HOSPADM

## 2024-03-13 RX ORDER — POTASSIUM CHLORIDE 750 MG/1
20 TABLET, EXTENDED RELEASE ORAL 2 TIMES DAILY
Status: DISCONTINUED | OUTPATIENT
Start: 2024-03-14 | End: 2024-03-17

## 2024-03-13 RX ORDER — POTASSIUM CHLORIDE 20MEQ/15ML
20 LIQUID (ML) ORAL 2 TIMES DAILY
Status: DISCONTINUED | OUTPATIENT
Start: 2024-03-14 | End: 2024-03-17

## 2024-03-13 RX ORDER — URSODIOL 300 MG/1
300 CAPSULE ORAL 2 TIMES DAILY
Status: DISCONTINUED | OUTPATIENT
Start: 2024-03-13 | End: 2024-03-13

## 2024-03-13 RX ORDER — VALGANCICLOVIR 450 MG/1
450 TABLET, FILM COATED ORAL EVERY EVENING
Status: DISCONTINUED | OUTPATIENT
Start: 2024-03-13 | End: 2024-03-21 | Stop reason: HOSPADM

## 2024-03-13 RX ORDER — AMOXICILLIN 250 MG
1 CAPSULE ORAL 2 TIMES DAILY PRN
Status: DISCONTINUED | OUTPATIENT
Start: 2024-03-13 | End: 2024-03-21 | Stop reason: HOSPADM

## 2024-03-13 RX ORDER — VALGANCICLOVIR 450 MG/1
450 TABLET, FILM COATED ORAL EVERY EVENING
Status: DISCONTINUED | OUTPATIENT
Start: 2024-03-13 | End: 2024-03-13

## 2024-03-13 RX ORDER — VALGANCICLOVIR HYDROCHLORIDE 50 MG/ML
450 POWDER, FOR SOLUTION ORAL EVERY EVENING
Status: DISCONTINUED | OUTPATIENT
Start: 2024-03-13 | End: 2024-03-19

## 2024-03-13 RX ORDER — MYCOPHENOLATE MOFETIL 200 MG/ML
750 POWDER, FOR SUSPENSION ORAL
Status: DISCONTINUED | OUTPATIENT
Start: 2024-03-14 | End: 2024-03-19

## 2024-03-13 RX ORDER — MAGNESIUM SULFATE HEPTAHYDRATE 40 MG/ML
2 INJECTION, SOLUTION INTRAVENOUS ONCE
Status: COMPLETED | OUTPATIENT
Start: 2024-03-13 | End: 2024-03-13

## 2024-03-13 RX ORDER — QUETIAPINE FUMARATE 25 MG/1
50 TABLET, FILM COATED ORAL AT BEDTIME
Status: DISCONTINUED | OUTPATIENT
Start: 2024-03-13 | End: 2024-03-16

## 2024-03-13 RX ADMIN — URSODIOL 300 MG: 300 CAPSULE ORAL at 08:38

## 2024-03-13 RX ADMIN — SULFAMETHOXAZOLE AND TRIMETHOPRIM 1 TABLET: 400; 80 TABLET ORAL at 20:28

## 2024-03-13 RX ADMIN — THIAMINE HCL TAB 100 MG 100 MG: 100 TAB at 08:39

## 2024-03-13 RX ADMIN — TACROLIMUS 5 MG: 5 CAPSULE ORAL at 08:39

## 2024-03-13 RX ADMIN — ACETAMINOPHEN 650 MG: 325 TABLET, FILM COATED ORAL at 08:38

## 2024-03-13 RX ADMIN — VALGANCICLOVIR HYDROCHLORIDE 450 MG: 50 POWDER, FOR SOLUTION ORAL at 22:53

## 2024-03-13 RX ADMIN — MAGNESIUM SULFATE IN WATER 2 G: 40 INJECTION, SOLUTION INTRAVENOUS at 15:03

## 2024-03-13 RX ADMIN — MYCOPHENOLATE MOFETIL 750 MG: 250 CAPSULE ORAL at 08:39

## 2024-03-13 RX ADMIN — MICAFUNGIN SODIUM 100 MG: 50 INJECTION, POWDER, LYOPHILIZED, FOR SOLUTION INTRAVENOUS at 11:04

## 2024-03-13 RX ADMIN — BUMETANIDE 2 MG: 2 TABLET ORAL at 08:38

## 2024-03-13 RX ADMIN — MYCOPHENOLATE MOFETIL 750 MG: 250 CAPSULE ORAL at 18:32

## 2024-03-13 RX ADMIN — Medication 10 MG: at 01:58

## 2024-03-13 RX ADMIN — MAGNESIUM OXIDE TAB 400 MG (241.3 MG ELEMENTAL MG) 800 MG: 400 (241.3 MG) TAB at 22:20

## 2024-03-13 RX ADMIN — Medication 1 PACKET: at 09:14

## 2024-03-13 RX ADMIN — ACETAMINOPHEN 650 MG: 325 TABLET, FILM COATED ORAL at 16:53

## 2024-03-13 RX ADMIN — QUETIAPINE FUMARATE 50 MG: 25 TABLET ORAL at 22:20

## 2024-03-13 RX ADMIN — LIDOCAINE 2 PATCH: 4 PATCH TOPICAL at 10:32

## 2024-03-13 RX ADMIN — INSULIN HUMAN 8 UNITS/HR: 1 INJECTION, SOLUTION INTRAVENOUS at 04:38

## 2024-03-13 RX ADMIN — BUMETANIDE 2 MG: 2 TABLET ORAL at 16:52

## 2024-03-13 RX ADMIN — INSULIN HUMAN 20 UNITS: 100 INJECTION, SUSPENSION SUBCUTANEOUS at 22:43

## 2024-03-13 RX ADMIN — ACETAMINOPHEN 650 MG: 325 TABLET, FILM COATED ORAL at 00:14

## 2024-03-13 RX ADMIN — TACROLIMUS 6 MG: 5 CAPSULE ORAL at 18:31

## 2024-03-13 RX ADMIN — INSULIN GLARGINE 20 UNITS: 100 INJECTION, SOLUTION SUBCUTANEOUS at 14:57

## 2024-03-13 RX ADMIN — PANTOPRAZOLE SODIUM 40 MG: 40 TABLET, DELAYED RELEASE ORAL at 08:38

## 2024-03-13 RX ADMIN — OXYCODONE HYDROCHLORIDE 2.5 MG: 5 TABLET ORAL at 02:56

## 2024-03-13 RX ADMIN — Medication 1 TABLET: at 08:38

## 2024-03-13 RX ADMIN — URSODIOL 300 MG: 300 CAPSULE ORAL at 22:21

## 2024-03-13 RX ADMIN — PREDNISONE 5 MG: 5 TABLET ORAL at 08:39

## 2024-03-13 RX ADMIN — Medication 1 PACKET: at 20:28

## 2024-03-13 RX ADMIN — ASPIRIN 325 MG ORAL TABLET 325 MG: 325 PILL ORAL at 08:38

## 2024-03-13 RX ADMIN — MAGNESIUM OXIDE TAB 400 MG (241.3 MG ELEMENTAL MG) 800 MG: 400 (241.3 MG) TAB at 08:39

## 2024-03-13 RX ADMIN — POTASSIUM CHLORIDE 20 MEQ: 750 TABLET, EXTENDED RELEASE ORAL at 08:38

## 2024-03-13 ASSESSMENT — ACTIVITIES OF DAILY LIVING (ADL)
ADLS_ACUITY_SCORE: 28
ADLS_ACUITY_SCORE: 28
ADLS_ACUITY_SCORE: 33
ADLS_ACUITY_SCORE: 34
ADLS_ACUITY_SCORE: 34
ADLS_ACUITY_SCORE: 33
ADLS_ACUITY_SCORE: 24
ADLS_ACUITY_SCORE: 28
ADLS_ACUITY_SCORE: 33
ADLS_ACUITY_SCORE: 32
ADLS_ACUITY_SCORE: 28
ADLS_ACUITY_SCORE: 33
ADLS_ACUITY_SCORE: 24
ADLS_ACUITY_SCORE: 33
ADLS_ACUITY_SCORE: 28
ADLS_ACUITY_SCORE: 33
ADLS_ACUITY_SCORE: 24
ADLS_ACUITY_SCORE: 33

## 2024-03-13 NOTE — PROGRESS NOTES
Transplant Surgery Cross Cover Note    I was paged about new onset chest pain, stable vitals    I evaluated the patient at bedside with RN. Patient is alert but disoriented and does not answer questions appropriately. States that he is having right sided chest pain. Unable to elaborate on time frame, associated symptoms. States that he is having pain at rest that is different than pain experienced with palpation of his chest wall. Unable to provide further history.     On cardiac telemetry, rates ~90s, appears to be sinus. SpO2 94-96% on room air    Gen: resting comfortably in bed  Neuro: alert to self only, disoriented  Chest: right upper chest wall ecchymosis appears to be extending from prior R non-tunneled dialysis line site, no palpable hematoma or swelling, tender to light palpation throughout  Pulm: NLB on RA  CV: regular rate and rhythm  Abd: soft, appropriately tender near incision, RLQ TIMOTHY drain site with saturated dressings    EKG reviewed at bedside; NSR without ischemic changes    A/P: EKG reviewed, remains in normal sinus rhythm. Vitally stable, remains on room air. Based on exam seems to be soft tissue discomfort. No further workup or interventions at this time.    Jak Reis MD  General Surgery, PGY-1  [phone]

## 2024-03-13 NOTE — PROGRESS NOTES
Calorie Count  Intake recorded for: 3/12  Total Kcals: 0 Total Protein: 0g  Kcals from Hospital Food: 0   Protein: 0g  Kcals from Outside Food (average):0 Protein: 0g  # Meals Ordered from Kitchen: 1 meal  # Meals Recorded: no intake recorded  # Supplements Recorded: 0     Note: Epic assessment notes 25% intake at 6:30 PM. Insufficient information given to calculate kcal & protein

## 2024-03-13 NOTE — PLAN OF CARE
"Goal Outcome Evaluation:  BP 90/78 (BP Location: Right arm)   Pulse 87   Temp 98.2  F (36.8  C) (Oral)   Resp 17   Ht 1.73 m (5' 8.11\")   Wt 99.3 kg (219 lb)   SpO2 93%   BMI 33.19 kg/m      Shift: 7714-1025  Isolation Status: Contact  VS: hypotensive at end of shift on RA, afebrile  Neuro: Aox1, oriented to self, only  Behaviors: Confused, but directable.  Does not generally make requests  BG: q1h ranging from , on insulin infusion, algorithm 4  Labs/Imaging: Mg 1.5, Ph 2.3, ALT and AST increased over yesterday, T. Bili 1.6  Respiratory: Clear lung sounds, diminished bases  Cardiac: Regular rhyth/mrate.  Tele reads sinus rhythm  Pain/Nausea: Ongoing mild pain, increasing with movement  PRN: NA  Diet: Regular diet, poor appetite. Calorie and carb counts.  Cycled tube feeding from 6515-2695 at goal of 75 mL/hour  IV Access: L triple lumen internal jugular, LLUE PIV  Infusion(s): micafungin daily, insulin infusion with TKO, Mg replacement x1  Lines/Drains: Old TIMOTHY site, leaking.  Bagged.  Serosanguinous output  GI/: Voiding, did not save  Skin: Clamshell incision, stapled, CDI, generalized bruising  Mobility: Assist of 1-2 with walker and gait belt  Events/Education: Unsure on status of lab book and med card  Plan: Stop insulin infusion evening shift.  Remove IJ                           "

## 2024-03-13 NOTE — PROVIDER NOTIFICATION
Provider notified that Pt has not slept for days.  He is confused.  Trazadone given on evenings.  Can we give him something else?  Pt also took melatonin PTA.

## 2024-03-13 NOTE — PLAN OF CARE
"Goal Outcome Evaluation:      Plan of Care Reviewed With: patient    Overall Patient Progress: no change  /75 (BP Location: Right arm)   Pulse 96   Temp 99  F (37.2  C) (Oral)   Resp 16   Ht 1.73 m (5' 8.11\")   Wt 99.3 kg (219 lb)   SpO2 95%   BMI 33.19 kg/m      Shift: 3935-6798  Isolation Status: contact  VS: stable on RA, afebrile  Neuro: Aox1 oriented to self  Behaviors: confused, disorganized thinking, auditory hallucinations, some visual hallucinations, pt slept less than an hour. Sitter at bedside, wife at bedside, bed alarm on.  BG: Q1H, on insulin gtt, algorithm 4, BG range from   Labs/Imaging: pending AM lab results  Respiratory: WDL  Cardiac: tachycardic  Pain/Nausea: chest pain, provider notified, ordered EKG, assessed bedside, continue to monitor. Denies nausea  PRN: oxycodone x2, trazadone x1, melatonin x1  Diet: regular on cycled TF from 8p-8a @ 75mL/hr  IV Access: L internal jugular, L PIV  Infusion(s): insulin gtt with NS, TKO  Lines/Drains: n/a  GI/: 2 loose BM's, voids spontaneously  Skin: clamshell incision stapled RON, old R TIMOTHY site leaking bright red blood, dressing changed x4  Mobility: up with 2 walker & GB  Plan: continue with plan of care, will notify MD with any changes            "

## 2024-03-13 NOTE — CONSULTS
"  Inpatient Diabetes Management Service : New Consult Note     Patient: Mary Lopez   YOB: 1970    MRN: [id number]     Date of Consult : 03/13/2024   Date of Admission: 3/4/2024     Reason for Consult: Management of DM2 w/ steroid & TF-induced hyperglycemia. 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 Insulin drip since operation with high needs on steroid taper and continuous tube feeds. Inpatient diabetes management service consulted on 3/13/24 for assistance in management.     History obtained via the patient, chart review and discussion with primary team.       Additional Historian:  Wife, Rosio    Patient is known to the Inpatient Diabetes Service from recent past admission(s). He has also been followed by [facility] outpatient endocrinology since 10/23, he has seen JAY Ramirez and JARROD Ram.     Patient is encephalopathic and minimally contributive to history. Discussion primarily with wife. Rosio states that Mary was diagnosed with diabetes very recently in October. Notes from Suzanne Parra 11/28/23 corroborate that patient had been noted to be hyperglycemic with routine labs. He met with diabetic educator in October and was started on metformin 500 mg and Semglee as well as Dexcom sensor. Since then he was transitioned to Tresiba and has been taking 36 units prior to transplant daily, with 8-12 units of Humalog with meals. According to notes from JAY Guerrier the patient has no history of pancreatitis. She writes \"I reviewed his abdominal imaging from December as well as his hemochromatosis testing.  He does indeed " "have iron deposits in his diabetes and is a heterozygote for hemochromatosis, thus his clinical situation is consistent with bronze diabetes.  No literature to support use of GLP or GLP-1 agonist in the situation I surmise that it could make the situation worse.  We will need to treat Mr. Chaudhry as an insulin-dependent diabetes.\"     He was hospitalized with SBP and seen by inpatient diabetes service 2/29/24-3/2/24. In the hospital he had fair control though his wife reports his blood sugars always read high in the 200s at home. She was noticing some low blood sugars overnight if she gave Tresiba and Humalog at the same time.     Patient was re-admitted on 3/4/24 for DDLT. He has been on an insulin drip since his transplant and had not restarted any long acting insulin until yesterday 3/12 when he was given 36 units of Tresiba. Prior to 3/12 he was on continuous tube feeds. Steroid taper completed on 3/11 and now on once daily 5 mg of prednisone for immunosuppression. Drip rates variable while on continuous tube feeds from 2-8 units/hr and no CHO coverage given secondary to poor PO intake. Yesterday 3/12 he was restarted on 36 units of Tresiba. He remained on the IV insulin drip, and was hyperglycemic in 200s, but experienced a low BG at 1845 after tube feeds had been stopped in preparation of starting cyclic feeds.  He was restarted on TF at 8pm at a higher rate of 75 ml/hr. His tube feeds were stopped in the morning on 3/13 and he is taking some PO with CHO coverage being given.     Last dose insulin PTA: 36 units Tresiba    Current inpatient regimen:  IV insulin Drip (nonDKA)     BG at time of consult: 157    Other Active/Contributory Medical Problems: s/p DDLT   Planned Procedures/Surgeries: none    Relevant Labs on Admission:    Renal function: Creatinine (1.08), eGFR (83)    BMP: Na (129), K (4.3) Bicarb (25), Anion Gap (8)    Hgb: 7.4   A1c: 4.8    Lactic Acid: 1.9     GAD65 antibody <5.0 " (11/3/23)  IA-2 autoatibody <5.4 (11/3/23)  C-peptide 3.7 (10/31/23)    Inpatient Glucose Trends:           Diabetes History:   Diabetes Type and Duration:   T2DM diagnosed 10/31/2023 with A1c 8.1%.  GAD65 negative 11/3/2023, IA-2 negative 11/3/2023, C -peptide normal (3.7) 10/31/2023.    Complications:  no peripheral neuropathy, unknown retinopathy (last eye exam: none since DM diagnosis), no nephropathy, no gastroparesis, no macrovascular disease    Last A1c: 4.8% (2/25/24) - in setting of anemia   RBC transfusion in past 3 months: yes, last 3/96 with transplant, and several times during 2/24 admission    History of DKA: no    Safety Kit:                - Glucagon: none     Outpatient Diabetes Provider:  [facility], Suzanne Todd (ALLY). Last visit 11/28/2023  Formal Diabetes Education/Educator: Liana Garcia. Last visit 2/23/2024.      Diet/Lifestyle: inconsistent meals. 2 meals a day plus snacks (often snacks on fruit). Very limited exercise.   Ability to [place] Prescribed Regimen:  Wife manages all BG checks and insulin administration.            PTA Regimen:   Diabetes Medications:                   1) Tresiba 36 units once daily    2) Humalog 15 units with breakfast and lunch, 13 units with dinner plus correction scale 1 unit per 35 >175 mg/dL and 1 unit per 35 >210 at bedtime.   Historical Diabetes Medications: none, reports outpatient diabetes educator was looking into an insulin pump.      BG monitoring device & frequency:  Dexcom G7, also uses glucometer, sometimes has issues with android and Dexcom G7.     BG trends at home: Usually 200-300s.   Hypoglycemia PTA: yes, once a week. Always overnight. Ususally 50-70s.          Medications Impacting Glycemia:    Steroids: prednisone 5mg daily   D5W containing solutions/medications: none  Other medications impacting glucose:  anti-rejection medications          Diet/Nutrition:    Orders Placed This Encounter      Regular Diet Adult      Supplements:  Glucerna TID with meals   TF: Two Alli HN at 75 ml/hr cycled 8pm-8am provides 197 g CHO   TPN: none          Review of Systems:    CC: incisional pain   Constitutional: denies fever and chills. denies recent weight gain or loss.    HEENT: denies headache, vision changes, hearing changes, sinus congestion, and swollen glands.   Cardiac: denies chest pain, palpitations, or racing heart.    Respiratory: denies dyspnea on exertion and at rest. denies wheezing, cough    GI: endorses abdominal pain, tenderness and bloating. Mild nausea and no vomiting. No changes in stool pattern, constipation and diarrhea.    : denies difficulty urinating, dysuria, and incontinence.    MSK/Integumentary. endorses swelling/edema. endorses weakness. no new rashes, wounds, and bruising.    Endocrine: no polyuria, polydipsia           Past Medical History:       Past Medical History:   Diagnosis Date    ANGEL (acute kidney injury) (H24)     Alcoholic cirrhosis of liver without ascites (H) 07/13/2023    Alcoholic hepatitis with ascites (H28) 10/03/2023    Alcoholic hepatitis without ascites (H28) 07/13/2023    Closed fracture of one rib of left side 09/14/2023    Concussion without loss of consciousness 03/11/2020    Decompensated hepatic cirrhosis (H) 09/15/2023    Diabetes mellitus, type 2 (H) 11/22/2023    Essential hypertension 03/11/2020    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)     Mild hyperlipidemia 12/07/2021    Persistent insomnia 07/13/2023    Portal hypertension (H) 07/13/2023    Scrotal abscess     Secondary esophageal 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 (H) 07/13/2023    Tobacco abuse disorder 03/11/2020    Type 2 diabetes mellitus with hyperglycemia (H) 12/22/2023             Past Surgical History:      Past Surgical History:   Procedure Laterality Date    BENCH LIVER  3/5/2024    Procedure: Bench liver;  Surgeon: Ryder Cortez MD;  Location: [facility] OR    CHOLECYSTECTOMY      COLONOSCOPY N/A 1/2/2024     "Procedure: COLONOSCOPY, WITH POLYPECTOMY;  Surgeon: Jak Urbina MD;  Location: [facility]    CV RIGHT HEART CATH MEASUREMENTS RECORDED N/A 2024    Procedure: Right Heart Catheterization;  Surgeon: Alfred Tafoya MD;  Location:  HEART CARDIAC CATH LAB    TONSILLECTOMY      TRANSPLANT LIVER RECIPIENT  DONOR N/A 3/5/2024    Procedure: Transplant liver recipient  donor, bile duct stent placement;  Surgeon: Ryder Cortez MD;  Location: [facility] OR    VASECTOMY               Social History:      Social History     Tobacco Use    Smoking status: Former     Types: Cigarettes     Passive exposure: Never    Smokeless tobacco: Current     Types: Chew     Last attempt to quit: 2004    Tobacco comments:     Chew daily 1/3 of a tin per day   Substance Use Topics    Alcohol use: Not Currently     Alcohol/week: 12.0 standard drinks of alcohol     Types: 12 Standard drinks or equivalent per week     Comment: Sober since 2023        History   Sexual Activity    Sexual activity: Yes    Partners: Female    Birth control/ protection: Male Surgical             Family History:    Reviewed and non-contributory.    Family History of Diabetes: t2dm brother and father     Family History   Problem Relation Age of Onset    Anxiety Disorder Mother     Depression Mother     Bipolar Disorder Mother     Chronic Obstructive Pulmonary Disease Mother     Lung Cancer Mother 81    Morbid Obesity Father     Diabetes Father     Diabetes Type 2  Brother     Substance Abuse Maternal Grandfather     Substance Abuse Paternal Grandfather     Colon Cancer No family hx of     Liver Disease No family hx of              Physical Exam:    /75 (BP Location: Right arm)   Pulse 96   Temp 99  F (37.2  C) (Oral)   Resp 16   Ht 1.73 m (5' 8.11\")   Wt 99.3 kg (219 lb)   SpO2 95%   BMI 33.19 kg/m     General Appearance: Restless, Confused  HEENT: Normocephalic, atraumatic. Anicteric, conjunctiva clear. Mucous membranes " moist, without exudates or ulcers.    Heart: Regular rate and rhythm.    Lungs:  Breathing comfortably  on RA. no cough, shortness of breath, wheezing.   Abdomen: Round, nondistended. Soft, tender, incision CDI   Extremities:  2+ bilateral lower extremity edema. Fluid movement of extremities.  CMS intact.    Skin:  Warm and dry. Scatteredbruising.   Neuro:  Oriented x, able to speak clearly.   Psych: Restless, forgetful         Laboratory Data:      Lab Results   Component Value Date    A1C 4.8 02/25/2024    A1C 7.7 12/19/2023    A1C 8.1 10/31/2023    A1C 5.1 08/23/2023    A1C 5.7 03/12/2022     Recent Labs   Lab Test 03/12/24 0438   WBC 4.3   RBC 2.84*   HGB 8.8*   HCT 26.7*   MCV 94   MCH 31.0   MCHC 33.0   RDW 18.8*   PLT 73*     Recent Labs   Lab Test 03/13/24  0707 03/13/24  0602 03/12/24  0501 03/12/24  0438 03/12/24  0319 03/12/24  0254 03/11/24  1821 03/11/24  1738   NA  --   --   --  137  --   --   --  136   POTASSIUM  --   --   --  3.6  --  3.4  --  3.3*   CHLORIDE  --   --   --  102  --   --   --  99   CO2  --   --   --  31*  --   --   --  31*   ANIONGAP  --   --   --  4*  --   --   --  6*   * 133*   < > 159*   < >  --    < > 136*   BUN  --   --   --  26.4*  --   --   --  26.8*   CR  --   --   --  0.78  --   --   --  0.76   MEG  --   --   --  8.3*  --   --   --  8.5*    < > = values in this interval not displayed.     Recent Labs   Lab Test 03/12/24 0438   PROTTOTAL 4.3*   ALBUMIN 2.4*   BILITOTAL 1.4*   ALKPHOS 81   AST 46*   ALT 49            Assessment and Recommendations:       Assessment:   Insulin dependent  Diabetes Mellitus, without known complications, sub optimal control based on hyperglycemia  (A1c 4.8% (2/25/2024)  - A1c inaccurte in setting of anemia and RBC transfusions the day prior.)  ETOH cirrhosis s/p liver transplant  Tube feed related hyperglycemia  Stress related  hyperglycemia     Plan/Recommendations:    - Lantus 20 q 24 hrs at 1400 and stop IV insulin drip   - Add NPH 20  units at 2000 for tube feed hyperglycemia    - Novolog Meal Coverage: 1 units per 10 g CHO, TID AC and PRN with snacks/supplements   - Novolog Correction Scale: medium  resistance (ISF: 50)  TID AC  while TF off and high resistance (ISF: 25) at 2200 and 0200 with TF overnight   - BG monitoring: TID AC, HS, 0200   - Hypoglycemia protocol    - Carb counting protocol     Discharge Planning: (tentative)    Medications: TBD    Test Claims: checking NPH coverate  Education:  Needs to be assessed closer to discharge.    Outpatient Follow-up:  recommend [facility],seen by Suzanne Parra in the past    Discussion:    Patient with suboptimally controlled insulin dependent diabetes, on PTA Tresiba of 36 units, now s/p DDLT  with tube feed and steroid hyperglycemia on insulin drip since transplant. Stopping Tresiba in the inpatient setting and dosed Lantus 20 units today. Plan was to continue on IV insulin drip, however his BG was within target while off his tube feeds today so the drip was discontinued. Planning to start cycled feeds today, will add NPH for coverage. High rates on insulin drip despite 36 units of Tresiba, but will dose him conservatively for now given BGs in the 90s during the day. Correction scale available at high resistance overnight.       Thank you for this consult. IDS will continue to follow.      Please notify Inpatient Diabetes Service if changes are planned to steroids, nutrition, TPN/TF and anticipated procedures requiring prolonged NPO status.     LESA Ghosh CNP    To contact Inpatient Diabetes Service:     7 AM - 5 PM: Page the IDS JOSELYN following the patient that day (see filed or incomplete progress notes/consult notes under Endocrinology)    OR if uncertain of provider assignment: page job code [phone]    5 PM - 7 AM: First call after hours is to primary service.    For urgent after-hours questions, page job code for on call fellow: [phone]     I spent a total of 80 minutes on the  date of the encounter doing prep/post-work, chart review, history and exam, documentation and further activities per the note including lab review, multidisciplinary communication, counseling the patient and/or coordinating care regarding acute hyper/hypoglycemic management, as well as discharge management and planning/communication.  See note for details.

## 2024-03-13 NOTE — PROGRESS NOTES
Transplant Surgery  Inpatient Daily Progress Note  2024    Assessment & Plan: Mary Lopez is a 53 year old male with a history of EtOH cirrhosis, complicated by encephalopathy, hyponatremia, chronic thrombocytopenia, macrocytic anemia, with hospitalization (2024) for encephalopathy and suspected SBP, and recent admission -3/2 for Strep bovis bacteremia. He is now s/p a  donor liver transplant on 3/5/24 with biliary stent placement with Dr. Cortez. Pre-op MELD 31.    Liver transplant w/ stent: POD 8   Transaminases up slightly, mild TB elevation, 1.4->1.6. 3/11 HIDA negative for leak. Jose Roberto BID. ASA 325mg daily.    Immunosuppressed due to medications:   Induction: Solu-Medrol/pred taper  Maintenance:  MMF: 750 mg BID  Tac goal 8-10. Lev 5.8 (13.5h), increase.  Pred 5 while tac <8    Neurology:   Acute postoperative pain:  Fair control.  -Oxycodone, weaning. Concern for opioid-induced hyperalgesia.  -Scheduled APAP  -Lidoderm  Delirium: Minimal sleep last few days. Promote day/night sleep pattern. Add Seroquel 50mg HS.    Hematology:   Acute blood loss anemia: Hgb ~9.  Thrombocytopenia: Due to liver disease, Plts 111.    Cardiorespiratory:   Bilateral pleural effusions: 3/7 CXR L pleural effusion stable, R pleural effusion slightly improved.  Suspected JAYLIN: Sleep study previously recommended to pt. STOP-BANG score 6/8 in 10/2023. Requiring O2 overnight only.  Atrial fib RVR: Onset 3/11 with rate 180s in setting of hypoK and hypoMg. Converted to NSR with metoprolol. Continue telemetry.  Wide complex tachycardia: VT vs AF with aberrant conduction. 17 beats on 3/12. Asymptomatic. Continue telemetry.    GI/Nutrition:   Severe malnutrition in the context of acute illness: Continue regular diet and cycled tube feeds. Refusing oral intake.  Diarrhea: Bowel regimen changed to PRN.    Endocrine:   Insulin-dependent type 2 diabetes mellitus w/ steroid hyperglycemia: Continue insulin gtt and  carb coverage (not given yesterday). Consult Endo.    Fluid/Electrolytes/Renal:   Hypokalemia: KCl 20meq BID while on Bumex.  Hypomagnesemia: Increase MagOx and give 2g mag sulf.  ANGEL, oliguric. Nephrology consulted, CRRT initiated 3/7-3/9. Now recovering. Cr 0.8.   Hypervolemia: Continue Bumex BID and lymphedema wraps.  Hypophosphatemia: Replace PRN <2.    : No acute issues.     Infectious disease: No acute issues.     Prophylaxis: fungal (Micafungin x14 days d/t CRRT), PJP (TMP/sulfa), CMV (valganciclovir)    Disposition: 7A, PT/OT      JOSELYN/Fellow/Resident Provider: Eugenie Pascal NP [id number]    Faculty: Benedicto Barrett MD     __________________________________________________________________    Interval History: History is obtained from the electronic health record and patient     Overnight events: Minimal sleep and confusion overnight. No appetite.    ROS:   A 10-point review of systems was negative except as noted above.    Curent Meds:   acetaminophen  650 mg Oral Q8H    aspirin  325 mg Oral or Feeding Tube Daily    bumetanide  2 mg Oral BID    insulin aspart   Subcutaneous TID w/meals    lidocaine  2 patch Transdermal Q24H    magnesium oxide  800 mg Oral BID    magnesium sulfate  2 g Intravenous Once    micafungin  100 mg Intravenous Daily    multivitamin w/minerals  1 tablet Oral Daily    mycophenolate  750 mg Oral BID IS    pantoprazole  40 mg Oral QAM AC    polyethylene glycol  17 g Oral or Feeding Tube Daily    potassium chloride  20 mEq Oral BID    predniSONE  5 mg Oral Daily    protein modular  1 packet Per Feeding Tube BID    QUEtiapine  50 mg Oral At Bedtime    senna-docusate  1 tablet Oral or Feeding Tube BID    sodium chloride (PF)  3 mL Intravenous Q8H    sulfamethoxazole-trimethoprim  1 tablet Oral or Feeding Tube QPM    tacrolimus  6 mg Oral BID IS    thiamine  100 mg Oral or Feeding Tube Daily    ursodiol  300 mg Oral BID    valGANciclovir  450 mg Oral QPM       Physical Exam:     Admit Weight: 107.2  "kg (236 lb 4.8 oz)    Current Vitals:   /66 (BP Location: Right arm)   Pulse 90   Temp 98.2  F (36.8  C) (Oral)   Resp 16   Ht 1.73 m (5' 8.11\")   Wt 99.3 kg (219 lb)   SpO2 91%   BMI 33.19 kg/m      Vital sign ranges:    Temp:  [98.1  F (36.7  C)-99  F (37.2  C)] 98.2  F (36.8  C)  Pulse:  [] 90  Resp:  [16] 16  BP: (103-123)/(66-77) 103/66  SpO2:  [91 %-98 %] 91 %  Patient Vitals for the past 24 hrs:   BP Temp Temp src Pulse Resp SpO2   03/13/24 1123 103/66 98.2  F (36.8  C) Oral 90 16 91 %   03/13/24 0212 109/75 99  F (37.2  C) Oral 96 16 95 %   03/12/24 2329 123/77 98.2  F (36.8  C) Oral 102 16 98 %   03/12/24 1319 110/73 98.1  F (36.7  C) Oral 103 16 96 %     General Appearance: NAD  Skin: normal, warm  Heart: NSR  Lungs: Unlabored, RA  Abdomen: Soft, appropriately tender, ND,  Incision CDI., drain removed   : no paris  Extremities: edema: bilaterally, +2 pitting wrapped  Neurologic: A&Ox2-4 with hallucinations and illogical statements    Frailty Scores          12/19/2023   Frailty Scores   Final Score Frail   Final Score Number 4       Data:   CMP  Recent Labs   Lab 03/13/24  1208 03/13/24  1102 03/13/24  0803 03/13/24  0801 03/12/24  0501 03/12/24  0438 03/11/24  1821 03/11/24  1738 03/09/24  0457 03/09/24  0405   NA  --   --   --  137  --  137  --  136   < > 137   POTASSIUM  --   --   --  4.1  --  3.6   < > 3.3*   < > 4.1   CHLORIDE  --   --   --  101  --  102  --  99   < > 104   CO2  --   --   --  29  --  31*  --  31*   < > 25   * 149*   < > 193*   < > 159*   < > 136*   < > 222*   BUN  --   --   --  22.2*  --  26.4*  --  26.8*   < > 30.0*   CR  --   --   --  0.76  --  0.78  --  0.76   < > 1.06   GFRESTIMATED  --   --   --  >90  --  >90  --  >90   < > 84   MEG  --   --   --  8.6  --  8.3*  --  8.5*   < > 7.9*   ICAW  --   --   --   --   --   --   --  4.9  --  4.6   MAG  --   --   --  1.5*  --  1.6*  --  1.8   < > 2.2   PHOS  --   --   --  2.3*  --  1.9*  --  2.2*   < > 4.4 "   ALBUMIN  --   --   --  2.8*  --  2.4*  --   --    < > 3.1*   BILITOTAL  --   --   --  1.6*  --  1.4*  --   --    < > 1.7*   ALKPHOS  --   --   --  110  --  81  --   --    < > 79   AST  --   --   --  76*  --  46*  --   --    < > 43   ALT  --   --   --  81*  --  49  --   --    < > 103*    < > = values in this interval not displayed.     CBC  Recent Labs   Lab 03/13/24  0801 03/12/24  0438   HGB 9.6* 8.8*   WBC 6.0 4.3   * 73*     COAGS  Recent Labs   Lab 03/11/24  0420 03/10/24  0656 03/06/24  2211 03/06/24  1603   INR 1.18* 1.30*   < > 1.41*   PTT  --   --   --  27    < > = values in this interval not displayed.      Urinalysis  Recent Labs   Lab Test 03/04/24  1042 02/23/24  1558   COLOR Yellow Yellow   APPEARANCE Clear Clear   URINEGLC 500* Negative   URINEBILI Negative Small*   URINEKETONE Negative Negative   SG 1.007 1.009   UBLD Negative Negative   URINEPH 5.0 5.0   PROTEIN Negative Negative   NITRITE Negative Negative   LEUKEST Negative Negative   RBCU <1 <1   WBCU <1 <1     Virology:  Hepatitis C Antibody   Date Value Ref Range Status   03/04/2024 Nonreactive Nonreactive Final     Comment:     A nonreactive screening test result does not exclude the possibility of exposure to or infection with HCV. Nonreactive screening test results in individuals with prior exposure to HCV may be due to antibody levels below the limit of detection of this assay or lack of reactivity to the HCV antigens used in this assay. Patients with recent HCV infections (<3 months from time of exposure) may have false-negative HCV antibody results due to the time needed for seroconversion (average of 8 to 9 weeks).

## 2024-03-14 ENCOUNTER — TELEPHONE (OUTPATIENT)
Dept: TRANSPLANT | Facility: CLINIC | Age: 54
End: 2024-03-14
Payer: COMMERCIAL

## 2024-03-14 ENCOUNTER — APPOINTMENT (OUTPATIENT)
Dept: PHYSICAL THERAPY | Facility: CLINIC | Age: 54
DRG: 005 | End: 2024-03-14
Attending: TRANSPLANT SURGERY
Payer: COMMERCIAL

## 2024-03-14 ENCOUNTER — APPOINTMENT (OUTPATIENT)
Dept: OCCUPATIONAL THERAPY | Facility: CLINIC | Age: 54
DRG: 005 | End: 2024-03-14
Attending: TRANSPLANT SURGERY
Payer: COMMERCIAL

## 2024-03-14 LAB
ALBUMIN SERPL BCG-MCNC: 2.4 G/DL (ref 3.5–5.2)
ALP SERPL-CCNC: 117 U/L (ref 40–150)
ALT SERPL W P-5'-P-CCNC: 85 U/L (ref 0–70)
ANION GAP SERPL CALCULATED.3IONS-SCNC: 6 MMOL/L (ref 7–15)
AST SERPL W P-5'-P-CCNC: 86 U/L (ref 0–45)
BILIRUB SERPL-MCNC: 1.4 MG/DL
BUN SERPL-MCNC: 26 MG/DL (ref 6–20)
CALCIUM SERPL-MCNC: 8.4 MG/DL (ref 8.6–10)
CHLORIDE SERPL-SCNC: 99 MMOL/L (ref 98–107)
CREAT SERPL-MCNC: 0.84 MG/DL (ref 0.67–1.17)
DEPRECATED HCO3 PLAS-SCNC: 29 MMOL/L (ref 22–29)
EGFRCR SERPLBLD CKD-EPI 2021: >90 ML/MIN/1.73M2
ERYTHROCYTE [DISTWIDTH] IN BLOOD BY AUTOMATED COUNT: 20.8 % (ref 10–15)
GLUCOSE BLDC GLUCOMTR-MCNC: 185 MG/DL (ref 70–99)
GLUCOSE BLDC GLUCOMTR-MCNC: 202 MG/DL (ref 70–99)
GLUCOSE BLDC GLUCOMTR-MCNC: 239 MG/DL (ref 70–99)
GLUCOSE BLDC GLUCOMTR-MCNC: 304 MG/DL (ref 70–99)
GLUCOSE BLDC GLUCOMTR-MCNC: 337 MG/DL (ref 70–99)
GLUCOSE BLDC GLUCOMTR-MCNC: 338 MG/DL (ref 70–99)
GLUCOSE BLDC GLUCOMTR-MCNC: 342 MG/DL (ref 70–99)
GLUCOSE BLDC GLUCOMTR-MCNC: 347 MG/DL (ref 70–99)
GLUCOSE BLDC GLUCOMTR-MCNC: 361 MG/DL (ref 70–99)
GLUCOSE BLDC GLUCOMTR-MCNC: 367 MG/DL (ref 70–99)
GLUCOSE BLDC GLUCOMTR-MCNC: 382 MG/DL (ref 70–99)
GLUCOSE BLDC GLUCOMTR-MCNC: 385 MG/DL (ref 70–99)
GLUCOSE SERPL-MCNC: 367 MG/DL (ref 70–99)
HCT VFR BLD AUTO: 26.7 % (ref 40–53)
HGB BLD-MCNC: 8.5 G/DL (ref 13.3–17.7)
MAGNESIUM SERPL-MCNC: 1.5 MG/DL (ref 1.7–2.3)
MCH RBC QN AUTO: 30.9 PG (ref 26.5–33)
MCHC RBC AUTO-ENTMCNC: 31.8 G/DL (ref 31.5–36.5)
MCV RBC AUTO: 97 FL (ref 78–100)
PHOSPHATE SERPL-MCNC: 3 MG/DL (ref 2.5–4.5)
PLATELET # BLD AUTO: 100 10E3/UL (ref 150–450)
POTASSIUM SERPL-SCNC: 4.8 MMOL/L (ref 3.4–5.3)
PROT SERPL-MCNC: 4.5 G/DL (ref 6.4–8.3)
RBC # BLD AUTO: 2.75 10E6/UL (ref 4.4–5.9)
SODIUM SERPL-SCNC: 134 MMOL/L (ref 135–145)
TACROLIMUS BLD-MCNC: 6 UG/L (ref 5–15)
TME LAST DOSE: NORMAL H
TME LAST DOSE: NORMAL H
WBC # BLD AUTO: 3.3 10E3/UL (ref 4–11)

## 2024-03-14 PROCEDURE — 97116 GAIT TRAINING THERAPY: CPT | Mod: GP

## 2024-03-14 PROCEDURE — 250N000012 HC RX MED GY IP 250 OP 636 PS 637: Performed by: NURSE PRACTITIONER

## 2024-03-14 PROCEDURE — 250N000011 HC RX IP 250 OP 636: Mod: JZ | Performed by: NURSE PRACTITIONER

## 2024-03-14 PROCEDURE — 250N000013 HC RX MED GY IP 250 OP 250 PS 637: Performed by: NURSE PRACTITIONER

## 2024-03-14 PROCEDURE — 250N000013 HC RX MED GY IP 250 OP 250 PS 637: Performed by: TRANSPLANT SURGERY

## 2024-03-14 PROCEDURE — 80197 ASSAY OF TACROLIMUS: CPT | Performed by: NURSE PRACTITIONER

## 2024-03-14 PROCEDURE — 83735 ASSAY OF MAGNESIUM: CPT | Performed by: NURSE PRACTITIONER

## 2024-03-14 PROCEDURE — 250N000013 HC RX MED GY IP 250 OP 250 PS 637

## 2024-03-14 PROCEDURE — 84100 ASSAY OF PHOSPHORUS: CPT | Performed by: NURSE PRACTITIONER

## 2024-03-14 PROCEDURE — 99207 PR NO BILLABLE SERVICE THIS VISIT: CPT | Performed by: TRANSPLANT SURGERY

## 2024-03-14 PROCEDURE — 250N000009 HC RX 250: Performed by: NURSE PRACTITIONER

## 2024-03-14 PROCEDURE — 97535 SELF CARE MNGMENT TRAINING: CPT | Mod: GO

## 2024-03-14 PROCEDURE — 97530 THERAPEUTIC ACTIVITIES: CPT | Mod: GP

## 2024-03-14 PROCEDURE — 258N000003 HC RX IP 258 OP 636: Performed by: NURSE PRACTITIONER

## 2024-03-14 PROCEDURE — 250N000011 HC RX IP 250 OP 636: Performed by: NURSE PRACTITIONER

## 2024-03-14 PROCEDURE — 36592 COLLECT BLOOD FROM PICC: CPT | Performed by: NURSE PRACTITIONER

## 2024-03-14 PROCEDURE — 99232 SBSQ HOSP IP/OBS MODERATE 35: CPT

## 2024-03-14 PROCEDURE — 85027 COMPLETE CBC AUTOMATED: CPT | Performed by: NURSE PRACTITIONER

## 2024-03-14 PROCEDURE — 250N000012 HC RX MED GY IP 250 OP 636 PS 637

## 2024-03-14 PROCEDURE — 80053 COMPREHEN METABOLIC PANEL: CPT | Performed by: NURSE PRACTITIONER

## 2024-03-14 PROCEDURE — 120N000011 HC R&B TRANSPLANT UMMC

## 2024-03-14 RX ORDER — MAGNESIUM SULFATE HEPTAHYDRATE 40 MG/ML
2 INJECTION, SOLUTION INTRAVENOUS ONCE
Status: COMPLETED | OUTPATIENT
Start: 2024-03-14 | End: 2024-03-14

## 2024-03-14 RX ADMIN — OXYCODONE HYDROCHLORIDE 2.5 MG: 5 TABLET ORAL at 08:59

## 2024-03-14 RX ADMIN — ACETAMINOPHEN 650 MG: 325 TABLET, FILM COATED ORAL at 16:09

## 2024-03-14 RX ADMIN — ACETAMINOPHEN 650 MG: 325 TABLET, FILM COATED ORAL at 23:17

## 2024-03-14 RX ADMIN — MICAFUNGIN SODIUM 100 MG: 50 INJECTION, POWDER, LYOPHILIZED, FOR SOLUTION INTRAVENOUS at 11:44

## 2024-03-14 RX ADMIN — ASPIRIN 325 MG ORAL TABLET 325 MG: 325 PILL ORAL at 08:59

## 2024-03-14 RX ADMIN — SULFAMETHOXAZOLE AND TRIMETHOPRIM 1 TABLET: 400; 80 TABLET ORAL at 20:55

## 2024-03-14 RX ADMIN — Medication 1 PACKET: at 09:28

## 2024-03-14 RX ADMIN — INSULIN GLARGINE 20 UNITS: 100 INJECTION, SOLUTION SUBCUTANEOUS at 15:00

## 2024-03-14 RX ADMIN — THIAMINE HCL TAB 100 MG 100 MG: 100 TAB at 08:59

## 2024-03-14 RX ADMIN — BUMETANIDE 2 MG: 2 TABLET ORAL at 08:58

## 2024-03-14 RX ADMIN — Medication 1 PACKET: at 20:46

## 2024-03-14 RX ADMIN — Medication 1 TABLET: at 08:58

## 2024-03-14 RX ADMIN — URSODIOL 300 MG: 300 CAPSULE ORAL at 08:59

## 2024-03-14 RX ADMIN — MAGNESIUM OXIDE TAB 400 MG (241.3 MG ELEMENTAL MG) 800 MG: 400 (241.3 MG) TAB at 09:12

## 2024-03-14 RX ADMIN — TACROLIMUS 2 MG: 5 CAPSULE ORAL at 14:56

## 2024-03-14 RX ADMIN — TACROLIMUS 6 MG: 5 CAPSULE ORAL at 08:59

## 2024-03-14 RX ADMIN — MYCOPHENOLATE MOFETIL 750 MG: 250 CAPSULE ORAL at 18:30

## 2024-03-14 RX ADMIN — POTASSIUM CHLORIDE 20 MEQ: 750 TABLET, EXTENDED RELEASE ORAL at 08:58

## 2024-03-14 RX ADMIN — LIDOCAINE 2 PATCH: 4 PATCH TOPICAL at 09:35

## 2024-03-14 RX ADMIN — URSODIOL 300 MG: 300 CAPSULE ORAL at 20:45

## 2024-03-14 RX ADMIN — MAGNESIUM SULFATE IN WATER 2 G: 40 INJECTION, SOLUTION INTRAVENOUS at 09:50

## 2024-03-14 RX ADMIN — VALGANCICLOVIR HYDROCHLORIDE 450 MG: 50 POWDER, FOR SOLUTION ORAL at 20:45

## 2024-03-14 RX ADMIN — BUMETANIDE 2 MG: 2 TABLET ORAL at 16:08

## 2024-03-14 RX ADMIN — MYCOPHENOLATE MOFETIL 750 MG: 250 CAPSULE ORAL at 08:59

## 2024-03-14 RX ADMIN — QUETIAPINE FUMARATE 50 MG: 25 TABLET ORAL at 21:02

## 2024-03-14 RX ADMIN — PREDNISONE 5 MG: 5 TABLET ORAL at 08:59

## 2024-03-14 RX ADMIN — PANTOPRAZOLE SODIUM 40 MG: 40 TABLET, DELAYED RELEASE ORAL at 08:58

## 2024-03-14 RX ADMIN — ACETAMINOPHEN 650 MG: 325 TABLET, FILM COATED ORAL at 00:22

## 2024-03-14 RX ADMIN — POTASSIUM CHLORIDE 20 MEQ: 750 TABLET, EXTENDED RELEASE ORAL at 20:55

## 2024-03-14 RX ADMIN — MAGNESIUM OXIDE TAB 400 MG (241.3 MG ELEMENTAL MG) 800 MG: 400 (241.3 MG) TAB at 21:03

## 2024-03-14 RX ADMIN — ACETAMINOPHEN 650 MG: 325 TABLET, FILM COATED ORAL at 08:58

## 2024-03-14 RX ADMIN — TACROLIMUS 7 MG: 5 CAPSULE ORAL at 18:31

## 2024-03-14 ASSESSMENT — ACTIVITIES OF DAILY LIVING (ADL)
ADLS_ACUITY_SCORE: 27
ADLS_ACUITY_SCORE: 32
ADLS_ACUITY_SCORE: 32
ADLS_ACUITY_SCORE: 28
ADLS_ACUITY_SCORE: 27
ADLS_ACUITY_SCORE: 32
ADLS_ACUITY_SCORE: 27
ADLS_ACUITY_SCORE: 32
ADLS_ACUITY_SCORE: 32
ADLS_ACUITY_SCORE: 28
ADLS_ACUITY_SCORE: 32

## 2024-03-14 NOTE — CONSULTS
Discharge Pharmacy Test Claim    Humulin N kwikpens are covered with $0 copay through patient's commercial [employer] plan.    Test Claim Copay   humulin N kwikpens 0.00   novolin N flexpens not covered     Evelyn Heard  [facility] Pharmacy Liaison  Ph: [phone]  Fax [phone]  Available on [email or web address] (learn more here)

## 2024-03-14 NOTE — PROGRESS NOTES
Transplant Surgery  Inpatient Daily Progress Note  2024    Assessment & Plan: Mary Lopez is a 53 year old male with a history of EtOH cirrhosis, complicated by encephalopathy, hyponatremia, chronic thrombocytopenia, macrocytic anemia, with hospitalization (2024) for encephalopathy and suspected SBP, and recent admission -3/2 for Strep bovis bacteremia. He is now s/p a  donor liver transplant on 3/5/24 with biliary stent placement with Dr. Cortez. Pre-op MELD 31.    Liver transplant w/ stent: POD 9   Transaminases up slightly ALT 81 ->85, AST 76->86, Total Bilirubin down 1.6->1.4. 3/11 HIDA negative for leak. Jose Roberto BID. ASA 325mg daily.  -Liver biopsy tomorrow if transaminases not improved    Immunosuppressed due to medications:   Induction: Solu-Medrol/pred taper  Maintenance:  MMF: 750 mg BID  Tac goal 8-10. Level 6 today. Tac extra 2 mg in AM, then 7 mg BID  Pred 5 while tac <8    Neurology:   Acute postoperative pain:  Fair control.  -Oxycodone, weaning. Concern for opioid-induced hyperalgesia.  -Scheduled APAP  -Lidoderm  Delirium: Slept last night with Seroquel 50 mg HS. Promote day/night sleep pattern.    Hematology:   Acute blood loss anemia: Hgb 8.5 Stable.  Thrombocytopenia: Due to liver disease, Plts 100.    Cardiorespiratory:   Bilateral pleural effusions: 3/7 CXR L pleural effusion stable, R pleural effusion slightly improved.  Suspected JAYLIN: Sleep study previously recommended to pt. STOP-BANG score 6/8 in 10/2023. Requiring O2 overnight only.  Atrial fib RVR: Onset 3/11 with rate 180s in setting of hypoK and hypoMg. Converted to NSR with metoprolol. Continue telemetry.  Wide complex tachycardia: VT vs AF with aberrant conduction. 17 beats on 3/12. Asymptomatic. Continue telemetry.    GI/Nutrition:   Severe malnutrition in the context of acute illness: Continue regular diet and cycled tube feeds. Minimal oral intake. NPO at midnight for potential Liver Biopsy  tomorrow. OK to continue tube feeds via Nasoduodenal tube.   Diarrhea: Bowel regimen changed to PRN.    Endocrine:   Insulin-dependent type 2 diabetes mellitus w/ steroid hyperglycemia: Endo following. Insulin ggt stopped 3/13. Lantus 20 units daily. NPH 20 units daily at 2000 for tube feed hyperglycemia. Novolog Sliding scale with carb coverage and blood sugar levels.     Fluid/Electrolytes/Renal:   Hypokalemia: KCl 20meq BID while on Bumex.  Hypomagnesemia: Continue oral 800 MagOx  BID and give 2g mag sulf IV one time today  ANGEL, oliguric. Nephrology consulted, CRRT initiated 3/7-3/9. Now recovering. Cr 0.8.   Hypervolemia: Continue Bumex BID and lymphedema wraps.  Hypophosphatemia: Replace PRN <2.    : No acute issues.     Infectious disease: No acute issues.     Prophylaxis: fungal (Micafungin x14 days d/t CRRT), PJP (TMP/sulfa), CMV (valganciclovir)    Disposition: 7A, PT/OT      JOSELYN/Fellow/Resident Provider: Alexander Bush NP Student/ Eugenie Pascal NP [id number]    Faculty: Benedicto Barrett MD     __________________________________________________________________    Interval History: History is obtained from the electronic health record and patient     Overnight events: Got some sleep last night. Patient confused. Low appetite. Update given to wife at bedside.    ROS:   A 10-point review of systems was negative except as noted above.    Curent Meds:   acetaminophen  650 mg Oral Q8H    aspirin  325 mg Oral or Feeding Tube Daily    bumetanide  2 mg Oral BID    insulin aspart  1-7 Units Subcutaneous TID w/meals    insulin aspart  1-9 Units Subcutaneous 2 times per day    insulin aspart   Subcutaneous TID w/meals    insulin glargine  20 Units Subcutaneous Q24H    insulin NPH  20 Units Subcutaneous Q24H    lidocaine  2 patch Transdermal Q24H    magnesium oxide  800 mg Oral BID    magnesium sulfate  2 g Intravenous Once    micafungin  100 mg Intravenous Daily    multivitamin w/minerals  1 tablet Oral Daily    mycophenolate   "750 mg Oral BID IS    Or    mycophenolate  750 mg Oral or NG Tube BID IS    pantoprazole  40 mg Oral QAM AC    potassium chloride  20 mEq Oral BID    Or    potassium chloride  20 mEq Oral or NG Tube BID    predniSONE  5 mg Oral Daily    protein modular  1 packet Per Feeding Tube BID    QUEtiapine  50 mg Oral At Bedtime    sodium chloride (PF)  3 mL Intravenous Q8H    sulfamethoxazole-trimethoprim  1 tablet Oral or Feeding Tube QPM    tacrolimus  6 mg Oral or NG Tube BID IS    thiamine  100 mg Oral or Feeding Tube Daily    ursodiol  300 mg Oral BID    Or    ursodiol  300 mg Oral or NG Tube BID    valGANciclovir  450 mg Oral QPM    Or    valGANciclovir  450 mg Oral or NG Tube QPM       Physical Exam:     Admit Weight: 107.2 kg (236 lb 4.8 oz)    Current Vitals:   /69 (BP Location: Left arm)   Pulse 87   Temp 99  F (37.2  C) (Oral)   Resp 17   Ht 1.73 m (5' 8.11\")   Wt 99.3 kg (219 lb)   SpO2 93%   BMI 33.19 kg/m      Vital sign ranges:    Temp:  [97.9  F (36.6  C)-99  F (37.2  C)] 99  F (37.2  C)  Pulse:  [87-90] 87  Resp:  [16-17] 17  BP: ()/(66-80) 128/69  SpO2:  [91 %-93 %] 93 %  Patient Vitals for the past 24 hrs:   BP Temp Temp src Pulse Resp SpO2   03/14/24 0608 128/69 99  F (37.2  C) Oral -- 17 --   03/14/24 0210 -- 97.9  F (36.6  C) Oral -- 17 --   03/14/24 0045 117/69 98.7  F (37.1  C) Oral -- 17 --   03/13/24 1541 90/78 98.2  F (36.8  C) Oral 87 17 93 %   03/13/24 1417 104/80 -- -- -- -- --   03/13/24 1123 103/66 98.2  F (36.8  C) Oral 90 16 91 %     General Appearance: NAD  Skin: normal, warm  Heart: NSR  Lungs: Unlabored, RA, Lung sounds clear  Abdomen: Soft, appropriately tender, ND,  Incision CDI., has a ostomy bag in place over old TIMOTHY drain site to collect output.   : voiding with good urine output  Extremities: edema: bilateral lower extremity 2+   Neurologic: A&Ox2. Rambling and  illogical statements. Tremors present in bilateral hands.     Frailty Scores          12/19/2023 "   Frailty Scores   Final Score Frail   Final Score Number 4       Data:   CMP  Recent Labs   Lab 03/14/24  0804 03/14/24  0601 03/14/24  0449 03/13/24  0803 03/13/24  0801 03/11/24  1821 03/11/24  1738 03/09/24 0457 03/09/24  0405   NA  --   --  134*  --  137   < > 136   < > 137   POTASSIUM  --   --  4.8  --  4.1   < > 3.3*   < > 4.1   CHLORIDE  --   --  99  --  101   < > 99   < > 104   CO2  --   --  29  --  29   < > 31*   < > 25   * 338* 367*   < > 193*   < > 136*   < > 222*   BUN  --   --  26.0*  --  22.2*   < > 26.8*   < > 30.0*   CR  --   --  0.84  --  0.76   < > 0.76   < > 1.06   GFRESTIMATED  --   --  >90  --  >90   < > >90   < > 84   MEG  --   --  8.4*  --  8.6   < > 8.5*   < > 7.9*   ICAW  --   --   --   --   --   --  4.9  --  4.6   MAG  --   --  1.5*  --  1.5*   < > 1.8   < > 2.2   PHOS  --   --  3.0  --  2.3*   < > 2.2*   < > 4.4   ALBUMIN  --   --  2.4*  --  2.8*   < >  --    < > 3.1*   BILITOTAL  --   --  1.4*  --  1.6*   < >  --    < > 1.7*   ALKPHOS  --   --  117  --  110   < >  --    < > 79   AST  --   --  86*  --  76*   < >  --    < > 43   ALT  --   --  85*  --  81*   < >  --    < > 103*    < > = values in this interval not displayed.     CBC  Recent Labs   Lab 03/14/24 0449 03/13/24  0801   HGB 8.5* 9.6*   WBC 3.3* 6.0   * 111*     COAGS  Recent Labs   Lab 03/11/24  0420 03/10/24  0656   INR 1.18* 1.30*      Urinalysis  Recent Labs   Lab Test 03/04/24  1042 02/23/24  1558   COLOR Yellow Yellow   APPEARANCE Clear Clear   URINEGLC 500* Negative   URINEBILI Negative Small*   URINEKETONE Negative Negative   SG 1.007 1.009   UBLD Negative Negative   URINEPH 5.0 5.0   PROTEIN Negative Negative   NITRITE Negative Negative   LEUKEST Negative Negative   RBCU <1 <1   WBCU <1 <1     Virology:  Hepatitis C Antibody   Date Value Ref Range Status   03/04/2024 Nonreactive Nonreactive Final     Comment:     A nonreactive screening test result does not exclude the possibility of exposure to or  infection with HCV. Nonreactive screening test results in individuals with prior exposure to HCV may be due to antibody levels below the limit of detection of this assay or lack of reactivity to the HCV antigens used in this assay. Patients with recent HCV infections (<3 months from time of exposure) may have false-negative HCV antibody results due to the time needed for seroconversion (average of 8 to 9 weeks).

## 2024-03-14 NOTE — PROGRESS NOTES
"/71 (BP Location: Right arm)   Pulse 91   Temp 97.8  F (36.6  C) (Oral)   Resp 16   Ht 1.73 m (5' 8.11\")   Wt 99.3 kg (219 lb)   SpO2 97%   BMI 33.19 kg/m      Shift: [id number]  Isolation Status: Contact (VRE)  VS: O2 in high 80s at times (nasal canula applied intermittently), afebrile  Neuro: Aox1, self only  Behaviors: Confused, lethargic, needs direction  BG: Q2h ranging from 337-382 (endrocrine notified)  Labs/Imaging: Na 134, Mg 1.5, ALT 85, AST 86  Respiratory: clear lung sounds, diminished bases, on intermittent 1 L O2 nasal canula when needed  Cardiac: regular rhythm/rate, on telemetry.  Tele reads NSR  Pain/Nausea: Ongoing mild pain with movement and position changes  PRN: Oxycodone 2.5 mg at 0900  Diet: regular, poor appetite, calorie and carb counts, cycled tube feeding from 0270-7521 at goal rate of 75 mL/hr  IV Access: LUE PIV (L internal jugular CVC removed)  Infusion(s): micafungin daily, Mg replacement x1  Lines/Drains: Old TIMOTHY site, leaking, bagged, minimal serosanguinous output  GI/: voiding, did not save  Skin: clamshell incision, stapled, generalized bruising  Mobility: assist of 1-2 with walker and gait belt  Events/Education: has med card and lab book in room, unknown when last updated  Plan: NPO at midnight for possible biopsy, continue tube feeds overnight   "

## 2024-03-14 NOTE — TELEPHONE ENCOUNTER
"Called and spoke to Adina, met w/ her on 7A on 3/12 to do discharge teaching.     Met with liver JOSELYN and discharge coordinator on 7A.  Discussed pertinent health issues related to liver transplant and discharge planning.  Upon discharge, pt will be going home.     ntroduced myself and explained the role of the post liver transplant coordinator and support team.      Briefly discussed the following topics:  1.) immunosuppression and the importance of taking regularly as directed.  2.) pertinent labs and their interpretation  3.) rejection signs / symptoms and treatment and 4.) Importance of long term follow-up with the transplant center and primary care physician 5.) presence of bililary stent placed at time of surgery,  need for patient to notify me if stent passes or need for removal if not seen 2-3 months post transplant  Has lab book and understands responsibility of getting and recording results. Education in process.  (See inpatient education teaching flowsheet). Is aware that they will have clinic visit 1 week and then again weekly x 3 after discharge from hospital or transitional care / rehab.  Is aware that he  needs a follow-up appointment with pre transplant primary care within 1-2 weeks of returning home. Is aware that he will need follow-up with the transplant team for the rest of his  life.  Initial follow-up will be with the transplant surgeon, then move to pre-transplant hepatologist.  Patient is aware that he will need lab testing, initially every Monday and Thursday to monitor liver function on a regular basis for the rest of her life.     Gave patient handouts entitled \"Things to Remember\" and \"Your Lab Results\" as well as a copy of contact numbers for myself, social work, transplant LPN and after hours/ weekend / holiday phone numbers.  Denies further questions at this time.      Organ specific education completed, and discharge planning has been conducted with multidisciplinary transplant care " team including physicians, pharmacy, nutrition, and social work.

## 2024-03-14 NOTE — PLAN OF CARE
Shift: [id number]  Isolation Status: Contact  VS: stable on room air, afebrile  Neuro: Alert to self only, lethargic  Behaviors: lethargic  BG: ACHS  Respiratory: WDL  Cardiac: WDL  Pain/Nausea: Denied pain and nausea  PRN: none given  Diet: regular diet, poor appetite  IV Access: L. Triple lumen internal jugular, LLUE PIV  Infusion(s): None  Lines/Drains: Old TIMOTHY site bagged  GI/: Voiding adequately, 2 loose BMs  Skin: Clamshell incision, stapled open to air  Mobility: Assist of 2 with walker and gait belt  Plan: Remove internal jugular, continue plan of care and notify team of any changes

## 2024-03-14 NOTE — PROGRESS NOTES
Patient's Name: Mary Lopez    Procedure Date: 03/14/24  Procedure Time 1130    Procedure Performed: Central line removal     The Central Venous Catheter (CVC) was removed per provider order.     The central venous catheter was located: Left Internal Jugular vein, with a total of 3 lumens.     The patient was placed in Supine position with Insertion site lower than heart.     Valsalva response achieved by: Patient instructed to take a deep breath and bear down while the CVC was removed with a constant steady motion.     Firm pressure was applied at the access site for 5 minutes until bleeding stopped.     Post removal site assessment; Clean and dry without bleeding. Occlusive dressing applied: Yes    CVC tip was inspected and intact: Yes    Tip was sent to lab for culture per provider order: No    Patient tolerated the procedure: well    2nd RN present during removal (if applicable) Rosie Murphy RN   3/14/2024   11:38 AM

## 2024-03-14 NOTE — PROGRESS NOTES
Calorie Count  Intake recorded for: 3/13  Total Kcals: 204 Total Protein: 7g  Kcals from Hospital Food: 204  Protein: 7g  Kcals from Outside Food (average):0 Protein: 0g  # Meals Ordered from Kitchen: [facility]  # Meals Recorded: 1 snack - 100% peaches   # Supplements Recorded: 66% 1 Glucerna

## 2024-03-14 NOTE — PROGRESS NOTES
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 Insulin drip since operation with high needs on steroid taper and continuous tube feeds. Inpatient diabetes management service consulted on 3/13/24 for assistance in management.           Assessment/Plan:     Assessment:   Insulin dependent  Diabetes Mellitus, without known complications, sub optimal control based on hyperglycemia  (A1c 4.8% (2/25/2024)  - A1c inaccurte in setting of anemia and RBC transfusions the day prior.)  ETOH cirrhosis s/p liver transplant  Tube feed related hyperglycemia  Stress related  hyperglycemia      Plan/Recommendations:    - Increase Lantus to 24 units q 24 hrs at 1400   - Increase NPH to 45 units at 2000 for tube feed hyperglycemia    - Novolog Meal Coverage: 1 units per 10 g CHO, TID AC and PRN with snacks/supplements   - Novolog Correction Scale: high resistance (ISF: 25) every 4 hours   - BG monitoring: every 4 hours   - Hypoglycemia protocol    - Carb counting protocol      Discussion:   Insulin drip needs declined while TF off and thus it was stopped yesterday.  Added 20 units of NPH for cycled feeds, however BGs into 300s overnight. Will increase NPH based on assessment of previous drip rates and correction needed overnight and Lantus by 20%. Increased correction scale to ISF 25 q 4 hrs during the day as he is not eating routine meals and with cycled tube feeds    Discharge Planning: (tentative)    Medications: TBD    Test Claims: See PERRY Heard note 3/14- Humulin N kwikpens covered at $0   Education:  Needs to be assessed closer to discharge.    Outpatient Follow-up:  recommend [facility] Endocrinology,seen by Suzanne Parra in the past    Please notify Inpatient Diabetes Service if changes are  planned to steroids, nutrition, TPN/TF and anticipated procedures requiring prolonged NPO status.           Interval History/Review of Systems :   The last 24 hours progress and nursing notes reviewed.  Tube feeds switched to cycled yesterday evening. BGs elevated during the duration of feeds. Feeds off at 0800, however BG was not trending down as expected with feeds off. Increased correction scale, NPH and Lantus   Ate one bite of omelette for breakfast. Lethargic, not sure if he will work with PT today. Falls asleep during conversation.     Planned Procedures/Surgeries: none    Inpatient Glucose Control:       Recent Labs   Lab 03/14/24  1027 03/14/24  0910 03/14/24  0804 03/14/24  0601 03/14/24  0449 03/14/24  0405   * 382* 361* 338* 367* 342*             Medications Impacting Glycemia:   Steroids: prednisone 5mg daily   D5W containing solutions/medications: none  Other medications impacting glucose:  anti-rejection medications         Nutrition:   Orders Placed This Encounter      Regular Diet Adult      NPO per Anesthesia Guidelines for Procedure/Surgery Except for: Meds, Other; Specify: Tube feeds    Supplements:  Glucerna TID with meals   TF: Two Alli HN at 75 ml/hr cycled 8pm-8am provides 197 g CHO   TPN: none         Diabetes History: see full consult note for complete diabetes history   Diabetes Type and Duration:   T2DM diagnosed 10/31/2023 with A1c 8.1%.  GAD65 negative 11/3/2023, IA-2 negative 11/3/2023, C -peptide normal (3.7) 10/31/2023.      Diabetes Medications:                   1) Tresiba 36 units once daily    2) Humalog 15 units with breakfast and lunch, 13 units with dinner plus correction scale 1 unit per 35 >175 mg/dL and 1 unit per 35 >210 at bedtime.   Historical Diabetes Medications: none, reports outpatient diabetes educator was looking into an insulin pump.     BG monitoring device & frequency:  Dexcom G7, also uses glucometer, sometimes has issues with android and Dexcom G7.  "  Outpatient Diabetes Provider:  [facility] Endocrinology, Suzanne Todd (ALLY). Last visit 11/28/2023  Formal Diabetes Education/Educator: Liana Garcia. Last visit 2/23/2024.         Physical Exam:   /69 (BP Location: Left arm)   Pulse 87   Temp 99  F (37.2  C) (Oral)   Resp 17   Ht 1.73 m (5' 8.11\")   Wt 99.3 kg (219 lb)   SpO2 93%   BMI 33.19 kg/m    General Appearance:  No acute distress, resting comfortably   Hypoactive    HEENT: Normocephalic, atraumatic. Anicteric, conjunctiva clear. Mucous membranes moist, without exudates or ulcers.    Heart: Regular rate and rhythm.    Lungs:  Breathing comfortably on 2 L NC.   Abdomen: Round, nondistended. Soft, nontender. Incision CDI  Extremities: 1+ bilateral lower extremity edema. Fluid movement of extremities.  CMS intact.    Skin:  Warm and dry.   Neuro: Confused, no focal deficits  Psych:  Calm, appropriate mood and affect.            Data:     Lab Results   Component Value Date    A1C 4.8 02/25/2024    A1C 7.7 12/19/2023    A1C 8.1 10/31/2023    A1C 5.1 08/23/2023    A1C 5.7 03/12/2022       ROUTINE IP LABS (Last four results)  BMP  Recent Labs   Lab 03/14/24  1027 03/14/24  0910 03/14/24  0804 03/14/24  0601 03/14/24  0449 03/13/24  0803 03/13/24  0801 03/12/24  0501 03/12/24  0438 03/12/24  0319 03/12/24  0254 03/11/24  1821 03/11/24  1738   NA  --   --   --   --  134*  --  137  --  137  --   --   --  136   POTASSIUM  --   --   --   --  4.8  --  4.1  --  3.6  --  3.4  --  3.3*   CHLORIDE  --   --   --   --  99  --  101  --  102  --   --   --  99   MEG  --   --   --   --  8.4*  --  8.6  --  8.3*  --   --   --  8.5*   CO2  --   --   --   --  29  --  29  --  31*  --   --   --  31*   BUN  --   --   --   --  26.0*  --  22.2*  --  26.4*  --   --   --  26.8*   CR  --   --   --   --  0.84  --  0.76  --  0.78  --   --   --  0.76   * 382* 361* 338* 367*   < > 193*   < > 159*   < >  --    < > 136*    < > = values in this interval not displayed. "     CBC  Recent Labs   Lab 03/14/24  0449 03/13/24  0801 03/12/24  0438 03/11/24  0420   WBC 3.3* 6.0 4.3 4.9   RBC 2.75* 3.05* 2.84* 2.79*   HGB 8.5* 9.6* 8.8* 8.5*   HCT 26.7* 29.2* 26.7* 26.7*   MCV 97 96 94 96   MCH 30.9 31.5 31.0 30.5   MCHC 31.8 32.9 33.0 31.8   RDW 20.8* 20.1* 18.8* 18.7*   * 111* 73* 59*     INR  Recent Labs   Lab 03/11/24  0420 03/10/24  0656 03/09/24  0405 03/08/24  0358   INR 1.18* 1.30* 1.25* 1.32*       Inpatient Diabetes Service will continue to follow, please don't hesitate to contact the team with any questions or concerns.     LESA Ghosh, CNP   Inpatient Diabetes Management Service   Pager: [phone]   Available on Vocera      Plan discussed with patient, bedside RN, and primary team via this note.    To contact Inpatient Diabetes Service:     7 AM - 5 PM: Page the IDS [email or web address] following the patient that day (see filed or incomplete progress notes/consult notes under Endocrinology)    OR if uncertain of provider assignment: page job code [phone]    5 PM - 7 AM: First call after hours is to primary service.    For urgent after-hours questions, page job code for on call fellow: [phone]     I spent a total of 35 minutes on the date of the encounter doing prep/post-work, chart review, history and exam, documentation and further activities per the note including lab review, multidisciplinary communication, counseling the patient and/or coordinating care regarding acute hyper/hypoglycemic management, as well as discharge management and planning/communication.  See note for details.

## 2024-03-14 NOTE — PLAN OF CARE
"Goal Outcome Evaluation:      Plan of Care Reviewed With: patient    Overall Patient Progress: improving  /69 (BP Location: Left arm)   Pulse 87   Temp 97.9  F (36.6  C) (Oral)   Resp 17   Ht 1.73 m (5' 8.11\")   Wt 99.3 kg (219 lb)   SpO2 93%   BMI 33.19 kg/m      Shift: 3971-3114  Isolation Status: contact  VS: stable on RA, afebrile  Neuro: Aox1 oriented to self  Behaviors: calm and cooperative, slept most of the shift  B, 304, 342, iMD notified, ordered one time insulin dosage.   Labs/Imaging: Billirubin: 1.4  Respiratory: diminished breath sounds in bases  Cardiac: tele NSR  Pain/Nausea: controlled with scheduled tylenol  PRN: none given  Diet: regular, TF @ 75mL/hr 8p-8a  IV Access: L internal jugular, L PIV  Infusion(s): n/a  Lines/Drains: old TIMOTHY site leaking with ostomy bag covering. NJ with TF  GI/: voids spontaneously without difficulty  Skin: clamshell incision stapled RON  Mobility: up with 2 gb and walker  Plan: continue with plan of care, will notify MD with any changes            "

## 2024-03-15 ENCOUNTER — APPOINTMENT (OUTPATIENT)
Dept: INTERVENTIONAL RADIOLOGY/VASCULAR | Facility: CLINIC | Age: 54
DRG: 005 | End: 2024-03-15
Payer: COMMERCIAL

## 2024-03-15 ENCOUNTER — APPOINTMENT (OUTPATIENT)
Dept: PHYSICAL THERAPY | Facility: CLINIC | Age: 54
DRG: 005 | End: 2024-03-15
Attending: TRANSPLANT SURGERY
Payer: COMMERCIAL

## 2024-03-15 LAB
ACANTHOCYTES BLD QL SMEAR: NORMAL
ALBUMIN SERPL BCG-MCNC: 2.9 G/DL (ref 3.5–5.2)
ALP SERPL-CCNC: 239 U/L (ref 40–150)
ALT SERPL W P-5'-P-CCNC: 146 U/L (ref 0–70)
ANION GAP SERPL CALCULATED.3IONS-SCNC: 10 MMOL/L (ref 7–15)
AST SERPL W P-5'-P-CCNC: 152 U/L (ref 0–45)
AUER BODIES BLD QL SMEAR: NORMAL
BASO STIPL BLD QL SMEAR: NORMAL
BILIRUB SERPL-MCNC: 1.8 MG/DL
BITE CELLS BLD QL SMEAR: NORMAL
BLISTER CELLS BLD QL SMEAR: NORMAL
BUN SERPL-MCNC: 24.1 MG/DL (ref 6–20)
BURR CELLS BLD QL SMEAR: NORMAL
CALCIUM SERPL-MCNC: 9.2 MG/DL (ref 8.6–10)
CHLORIDE SERPL-SCNC: 99 MMOL/L (ref 98–107)
CREAT SERPL-MCNC: 0.85 MG/DL (ref 0.67–1.17)
DACRYOCYTES BLD QL SMEAR: NORMAL
DEPRECATED HCO3 PLAS-SCNC: 26 MMOL/L (ref 22–29)
EGFRCR SERPLBLD CKD-EPI 2021: >90 ML/MIN/1.73M2
ELLIPTOCYTES BLD QL SMEAR: NORMAL
ERYTHROCYTE [DISTWIDTH] IN BLOOD BY AUTOMATED COUNT: 20.1 % (ref 10–15)
FRAGMENTS BLD QL SMEAR: NORMAL
GLUCOSE BLDC GLUCOMTR-MCNC: 155 MG/DL (ref 70–99)
GLUCOSE BLDC GLUCOMTR-MCNC: 186 MG/DL (ref 70–99)
GLUCOSE BLDC GLUCOMTR-MCNC: 196 MG/DL (ref 70–99)
GLUCOSE BLDC GLUCOMTR-MCNC: 196 MG/DL (ref 70–99)
GLUCOSE BLDC GLUCOMTR-MCNC: 226 MG/DL (ref 70–99)
GLUCOSE BLDC GLUCOMTR-MCNC: 242 MG/DL (ref 70–99)
GLUCOSE BLDC GLUCOMTR-MCNC: 282 MG/DL (ref 70–99)
GLUCOSE SERPL-MCNC: 263 MG/DL (ref 70–99)
HCT VFR BLD AUTO: 32.9 % (ref 40–53)
HGB BLD-MCNC: 10.3 G/DL (ref 13.3–17.7)
HGB C CRYSTALS: NORMAL
HOWELL-JOLLY BOD BLD QL SMEAR: NORMAL
MAGNESIUM SERPL-MCNC: 1.5 MG/DL (ref 1.7–2.3)
MCH RBC QN AUTO: 30.8 PG (ref 26.5–33)
MCHC RBC AUTO-ENTMCNC: 31.3 G/DL (ref 31.5–36.5)
MCV RBC AUTO: 99 FL (ref 78–100)
NEUTS HYPERSEG BLD QL SMEAR: NORMAL
PHOSPHATE SERPL-MCNC: 2.6 MG/DL (ref 2.5–4.5)
PLAT MORPH BLD: NORMAL
PLATELET # BLD AUTO: 144 10E3/UL (ref 150–450)
POLYCHROMASIA BLD QL SMEAR: NORMAL
POTASSIUM SERPL-SCNC: 4.8 MMOL/L (ref 3.4–5.3)
PROT SERPL-MCNC: 5.6 G/DL (ref 6.4–8.3)
RBC # BLD AUTO: 3.34 10E6/UL (ref 4.4–5.9)
RBC AGGLUT BLD QL: NORMAL
RBC MORPH BLD: NORMAL
ROULEAUX BLD QL SMEAR: NORMAL
SICKLE CELLS BLD QL SMEAR: NORMAL
SMUDGE CELLS BLD QL SMEAR: NORMAL
SODIUM SERPL-SCNC: 135 MMOL/L (ref 135–145)
SPHEROCYTES BLD QL SMEAR: NORMAL
STOMATOCYTES BLD QL SMEAR: NORMAL
TACROLIMUS BLD-MCNC: 8.1 UG/L (ref 5–15)
TARGETS BLD QL SMEAR: NORMAL
TME LAST DOSE: NORMAL H
TME LAST DOSE: NORMAL H
TOXIC GRANULES BLD QL SMEAR: NORMAL
VARIANT LYMPHS BLD QL SMEAR: NORMAL
WBC # BLD AUTO: 4.8 10E3/UL (ref 4–11)

## 2024-03-15 PROCEDURE — 76942 ECHO GUIDE FOR BIOPSY: CPT | Mod: 26 | Performed by: STUDENT IN AN ORGANIZED HEALTH CARE EDUCATION/TRAINING PROGRAM

## 2024-03-15 PROCEDURE — 88307 TISSUE EXAM BY PATHOLOGIST: CPT | Mod: 26 | Performed by: PATHOLOGY

## 2024-03-15 PROCEDURE — 85027 COMPLETE CBC AUTOMATED: CPT | Performed by: NURSE PRACTITIONER

## 2024-03-15 PROCEDURE — 99233 SBSQ HOSP IP/OBS HIGH 50: CPT | Performed by: NURSE PRACTITIONER

## 2024-03-15 PROCEDURE — 250N000013 HC RX MED GY IP 250 OP 250 PS 637: Performed by: NURSE PRACTITIONER

## 2024-03-15 PROCEDURE — 250N000009 HC RX 250: Performed by: PHYSICIAN ASSISTANT

## 2024-03-15 PROCEDURE — 47000 NEEDLE BIOPSY OF LIVER PERQ: CPT | Mod: GC | Performed by: STUDENT IN AN ORGANIZED HEALTH CARE EDUCATION/TRAINING PROGRAM

## 2024-03-15 PROCEDURE — 250N000013 HC RX MED GY IP 250 OP 250 PS 637

## 2024-03-15 PROCEDURE — 36415 COLL VENOUS BLD VENIPUNCTURE: CPT | Performed by: NURSE PRACTITIONER

## 2024-03-15 PROCEDURE — 250N000009 HC RX 250: Performed by: NURSE PRACTITIONER

## 2024-03-15 PROCEDURE — 250N000011 HC RX IP 250 OP 636: Mod: JZ | Performed by: NURSE PRACTITIONER

## 2024-03-15 PROCEDURE — 99232 SBSQ HOSP IP/OBS MODERATE 35: CPT | Mod: 24 | Performed by: PHYSICIAN ASSISTANT

## 2024-03-15 PROCEDURE — 97530 THERAPEUTIC ACTIVITIES: CPT | Mod: GP

## 2024-03-15 PROCEDURE — 83735 ASSAY OF MAGNESIUM: CPT | Performed by: NURSE PRACTITIONER

## 2024-03-15 PROCEDURE — 250N000012 HC RX MED GY IP 250 OP 636 PS 637: Performed by: NURSE PRACTITIONER

## 2024-03-15 PROCEDURE — 120N000011 HC R&B TRANSPLANT UMMC

## 2024-03-15 PROCEDURE — 84100 ASSAY OF PHOSPHORUS: CPT | Performed by: NURSE PRACTITIONER

## 2024-03-15 PROCEDURE — 88342 IMHCHEM/IMCYTCHM 1ST ANTB: CPT | Mod: TC | Performed by: PHYSICIAN ASSISTANT

## 2024-03-15 PROCEDURE — 0FB03ZX EXCISION OF LIVER, PERCUTANEOUS APPROACH, DIAGNOSTIC: ICD-10-PCS | Performed by: STUDENT IN AN ORGANIZED HEALTH CARE EDUCATION/TRAINING PROGRAM

## 2024-03-15 PROCEDURE — C1889 IMPLANT/INSERT DEVICE, NOC: HCPCS

## 2024-03-15 PROCEDURE — 80197 ASSAY OF TACROLIMUS: CPT | Performed by: NURSE PRACTITIONER

## 2024-03-15 PROCEDURE — 88342 IMHCHEM/IMCYTCHM 1ST ANTB: CPT | Mod: 26 | Performed by: PATHOLOGY

## 2024-03-15 PROCEDURE — 258N000003 HC RX IP 258 OP 636: Performed by: NURSE PRACTITIONER

## 2024-03-15 PROCEDURE — 250N000011 HC RX IP 250 OP 636: Performed by: PHYSICIAN ASSISTANT

## 2024-03-15 PROCEDURE — 250N000013 HC RX MED GY IP 250 OP 250 PS 637: Performed by: TRANSPLANT SURGERY

## 2024-03-15 PROCEDURE — 80053 COMPREHEN METABOLIC PANEL: CPT | Performed by: NURSE PRACTITIONER

## 2024-03-15 PROCEDURE — 97116 GAIT TRAINING THERAPY: CPT | Mod: GP

## 2024-03-15 PROCEDURE — 88313 SPECIAL STAINS GROUP 2: CPT | Mod: 26 | Performed by: PATHOLOGY

## 2024-03-15 RX ORDER — LIDOCAINE HYDROCHLORIDE 10 MG/ML
1-30 INJECTION, SOLUTION EPIDURAL; INFILTRATION; INTRACAUDAL; PERINEURAL
Status: COMPLETED | OUTPATIENT
Start: 2024-03-15 | End: 2024-03-15

## 2024-03-15 RX ORDER — MAGNESIUM SULFATE HEPTAHYDRATE 40 MG/ML
2 INJECTION, SOLUTION INTRAVENOUS ONCE
Status: COMPLETED | OUTPATIENT
Start: 2024-03-15 | End: 2024-03-15

## 2024-03-15 RX ADMIN — POTASSIUM CHLORIDE 20 MEQ: 1.5 SOLUTION ORAL at 08:43

## 2024-03-15 RX ADMIN — ACETAMINOPHEN 650 MG: 325 TABLET, FILM COATED ORAL at 14:33

## 2024-03-15 RX ADMIN — LIDOCAINE HYDROCHLORIDE 30 ML: 10 INJECTION, SOLUTION EPIDURAL; INFILTRATION; INTRACAUDAL; PERINEURAL at 13:21

## 2024-03-15 RX ADMIN — TACROLIMUS 7 MG: 5 CAPSULE ORAL at 17:43

## 2024-03-15 RX ADMIN — MAGNESIUM SULFATE IN WATER 2 G: 40 INJECTION, SOLUTION INTRAVENOUS at 16:29

## 2024-03-15 RX ADMIN — Medication 1 PACKET: at 20:11

## 2024-03-15 RX ADMIN — TACROLIMUS 7 MG: 5 CAPSULE ORAL at 08:43

## 2024-03-15 RX ADMIN — BUMETANIDE 2 MG: 2 TABLET ORAL at 16:29

## 2024-03-15 RX ADMIN — ACETAMINOPHEN 650 MG: 325 TABLET, FILM COATED ORAL at 23:52

## 2024-03-15 RX ADMIN — URSODIOL 300 MG: 300 CAPSULE ORAL at 08:43

## 2024-03-15 RX ADMIN — OXYCODONE HYDROCHLORIDE 2.5 MG: 5 TABLET ORAL at 22:45

## 2024-03-15 RX ADMIN — MYCOPHENOLATE MOFETIL 750 MG: 200 POWDER, FOR SUSPENSION ORAL at 08:43

## 2024-03-15 RX ADMIN — OXYCODONE HYDROCHLORIDE 2.5 MG: 5 TABLET ORAL at 14:33

## 2024-03-15 RX ADMIN — URSODIOL 300 MG: 300 CAPSULE ORAL at 20:11

## 2024-03-15 RX ADMIN — Medication 1 PACKET: at 08:51

## 2024-03-15 RX ADMIN — VALGANCICLOVIR HYDROCHLORIDE 450 MG: 50 POWDER, FOR SOLUTION ORAL at 20:11

## 2024-03-15 RX ADMIN — ACETAMINOPHEN 650 MG: 325 TABLET, FILM COATED ORAL at 08:43

## 2024-03-15 RX ADMIN — MAGNESIUM OXIDE TAB 400 MG (241.3 MG ELEMENTAL MG) 800 MG: 400 (241.3 MG) TAB at 14:33

## 2024-03-15 RX ADMIN — BUMETANIDE 2 MG: 2 TABLET ORAL at 08:43

## 2024-03-15 RX ADMIN — PANTOPRAZOLE SODIUM 40 MG: 40 TABLET, DELAYED RELEASE ORAL at 08:43

## 2024-03-15 RX ADMIN — PREDNISONE 5 MG: 5 TABLET ORAL at 08:44

## 2024-03-15 RX ADMIN — MAGNESIUM OXIDE TAB 400 MG (241.3 MG ELEMENTAL MG) 800 MG: 400 (241.3 MG) TAB at 22:45

## 2024-03-15 RX ADMIN — OXYCODONE HYDROCHLORIDE 2.5 MG: 5 TABLET ORAL at 08:51

## 2024-03-15 RX ADMIN — THIAMINE HCL TAB 100 MG 100 MG: 100 TAB at 08:43

## 2024-03-15 RX ADMIN — MYCOPHENOLATE MOFETIL 750 MG: 200 POWDER, FOR SUSPENSION ORAL at 17:43

## 2024-03-15 RX ADMIN — Medication 1 TABLET: at 08:44

## 2024-03-15 RX ADMIN — SULFAMETHOXAZOLE AND TRIMETHOPRIM 1 TABLET: 400; 80 TABLET ORAL at 20:11

## 2024-03-15 RX ADMIN — POTASSIUM CHLORIDE 20 MEQ: 1.5 SOLUTION ORAL at 20:10

## 2024-03-15 RX ADMIN — QUETIAPINE FUMARATE 50 MG: 25 TABLET ORAL at 22:45

## 2024-03-15 ASSESSMENT — ACTIVITIES OF DAILY LIVING (ADL)
ADLS_ACUITY_SCORE: 34
ADLS_ACUITY_SCORE: 32
ADLS_ACUITY_SCORE: 32
ADLS_ACUITY_SCORE: 34
ADLS_ACUITY_SCORE: 34
ADLS_ACUITY_SCORE: 35
ADLS_ACUITY_SCORE: 34
ADLS_ACUITY_SCORE: 32
ADLS_ACUITY_SCORE: 34
ADLS_ACUITY_SCORE: 32

## 2024-03-15 NOTE — PROGRESS NOTES
CLINICAL NUTRITION SERVICES - Brief NOTE     Nutrition Prescription    RECOMMENDATIONS FOR MDs/PROVIDERS TO ORDER:  Patient to meet a minimum of 1500 kcals and 75 gram protein prior to discontinuing TF     Recommendations already ordered by Registered Dietitian (RD):  TwoCal HN @ 50 mL/hr x 12 hours (600 mL/day) + 2 packets Prosource TF20 to provide 1360 kcals (17 kcal/kg/day), 91 g PRO (1.2 g/kg/day), 420 mL H2O, 132 g CHO and 3 g Fiber daily.    Ordered calorie counts    Future/Additional Recommendations:  -- monitor calorie counts and the ability to adjust TF prn      EVALUATION OF THE PROGRESS TOWARD GOALS   Diet: NPO (Previous Diet: regular with Glucerna with meals   Nutrition Support: TwoCal HN @ 75 ml/hr x 12 hours (900 ml/day) + 2 pkts Prosource TF20   Provides 1960 kcal (25 kcal/kg), 116 grams protein (1.5g/kg), 630 ml free water, 197g CHO, 5g fiber   Intake:   Calorie counts improving. Calorie counts from 3/14: 1251 kcals and 60 gram protein        NEW FINDINGS   Labs (3/15): BUN 24.1 mg/dL (H), Cr 0.85 mg/dL, Alkaline phosphatase 239 U/L (H), Mg++ 1.5 mg/dL (L)    Meds:   Bumex  Lantus PEN  Insulin NPH injection  Mag-ox  Thera-vit-m  Prosource TF20 1 packet 2 times daily  Thiamine    INTERVENTIONS  Implementation  Collaboration with other providers - discussed FEN/GI with team. Team okayed this writer to adjust TF rate based on increased PO intake. Discussed changes with endocrine.   Enteral Nutrition - Modify rate    Monitoring/Evaluation  Progress toward goals will be monitored and evaluated per protocol.    Dena Blancas MS/RD/LD/CNSC  Available on Booodl

## 2024-03-15 NOTE — PLAN OF CARE
"/69 (BP Location: Left arm)   Pulse 85   Temp 97.4  F (36.3  C) (Axillary)   Resp 16   Ht 1.73 m (5' 8.11\")   Wt 93.6 kg (206 lb 6.4 oz)   SpO2 98%   BMI 31.28 kg/m        9702-3568  Neuro:  Pt. confused & oriented to self only. Sitter at bedside.   Behavior: Calm & cooperative with cares.   Activity: Pt. up with 1, GB & walker.   BG: q 4 hours. 226, 242. Sliding scale insulin administered per order.   Vital: AVSS on RA.   LDAs: Left PIV SL.  NJ for tube feeds.  Cardiac: On tele., NSR  Respiratory: LS diminished bases.  GI/:  Pt. voiding spontaneously with no difficulty, stools x3 this shift.   Skin: Incision stapled & RON  Pain/Nausea:  Pt. had no c/o's pain or nausea.   Diet: NPO at midnight. Tube feeds at 75cc/hour from 8pm-8am.  Labs/Imaging:  Morning labs pending.   Plan: Liver biopsy today. Continue to follow POC & notify team with change in status.                           "

## 2024-03-15 NOTE — CONSULTS
"      [facility] Consult Service Note  Interventional Radiology  03/15/24   7:57 AM    Consult Requested: \"transplant liver biopsy to rule out rejection\"    Recommendations/Plan:    -Patient is approved for an US-guided transplant liver biopsy.    -Timing of procedure is TBD based on IR staffing/schedule and triage.  -Please contact the IR charge RN at [phone] for estimated time of procedure.   -Labs WNL for procedure: INR 1.18, Plts 144.  -Orders entered for procedure. Patient is appropriately NPO.  -Medications to be held include: none.  -Informed consent will be completed prior to procedure.   -Case and imaging discussed with IR attending Dr. Valdo Barone MD.  -Recommendations were reviewed with requesting provider, Franchesca Aguilera.     History of Present Illness:  Mary Lopez is a 53 year old male with past medical history of EtOH cirrhosis, complicated by encephalopathy, hyponatremia, chronic thrombocytopenia, macrocytic anemia, with hospitalization (2024) for encephalopathy and suspected SBP, and recent admission -3/2 for Strep bovis bacteremia. He is now s/p a  donor liver transplant on 3/5/24 with biliary stent placement and has been having slight elevation in his transaminases (ALT 81 ->85, AST 76->86). Today labs reveal  and .      Pertinent Imaging Reviewed: Liver US from 3/6/24    Expected date of discharge: TBD    Vitals:   /69 (BP Location: Left arm)   Pulse 94   Temp 97.4  F (36.3  C) (Axillary)   Resp 16   Ht 1.73 m (5' 8.11\")   Wt 93.6 kg (206 lb 6.4 oz)   SpO2 98%   BMI 31.28 kg/m      Pertinent Labs Reviewed:  CBC:  Lab Results   Component Value Date    WBC 4.8 03/15/2024    WBC 3.3 (L) 2024    WBC 6.0 2024    WBC 10.8 2006    WBC 7.1 2005     Lab Results   Component Value Date    HGB 10.3 03/15/2024    HGB 8.5 2024    HGB 9.6 2024    HGB 16.9 2006    HGB 15.3 2005     Lab Results "   Component Value Date     03/15/2024     03/14/2024     03/13/2024     12/19/2006     11/29/2005    INR:  Lab Results   Component Value Date    INR 1.18 (H) 03/11/2024    PTT 27 03/06/2024          COVID Results:  COVID-19 Antibody Results, Testing for Immunity           No data to display              COVID-19 PCR Results          2/15/2024    20:17 2/23/2024    09:28 3/4/2024    10:37   COVID-19 PCR Results   SARS CoV2 PCR Positive  Negative  Negative    Cycle threshold 43.6       Potassium   Date Value Ref Range Status   03/15/2024 4.8 3.4 - 5.3 mmol/L Final   03/12/2022 3.8 3.4 - 5.3 mmol/L Final   07/05/2020 4.2 3.4 - 5.3 mmol/L Final     Potassium POCT   Date Value Ref Range Status   03/05/2024 4.1 3.4 - 5.3 mmol/L Final          Amanda Hicks PA-C  Interventional Radiology

## 2024-03-15 NOTE — PROGRESS NOTES
Calorie Count  Intake recorded for: 3/14  Total Kcals: 1251 Total Protein: 60g  Kcals from Hospital Food: 1251   Protein: 60g  Kcals from Outside Food (average):0 Protein: 0g  # Meals Ordered from Kitchen: 1 meal  # Meals Recorded: 1 - 100% banana, omelet w/ cheese and vegetables, peaches, sugar cookie, 8 oz chocolate milk  # Supplements Recorded: 2 - 100% 1 Ensure Enlive, 1 Glucerna

## 2024-03-15 NOTE — PROGRESS NOTES
IP Diabetes Management  Daily Note           Assessment and Plan:   HPI: Mary Lopez is a 53 year old with a comorbid history of ETOH cirrhosis, encephalopathy, hyponatremia, thrombocytopenia, and insulin dependent antibody negative diabetes, s/p DDLT 3/5/24. Insulin drip since operation with high needs on steroid taper and continuous tube feeds. Inpatient diabetes management service consulted on 3/13/24 for assistance in management.         Assessment:   Insulin dependent  Diabetes Mellitus, without known complications, sub optimal control based on hyperglycemia  (A1c 4.8% (2/25/2024)  - A1c inaccurte in setting of anemia and RBC transfusions the day prior.)  ETOH cirrhosis s/p liver transplant  Tube feed related hyperglycemia  Stress related  hyperglycemia     NPO today for liver biopsy, not eating much at baseline.  Cycled TF rate tonight will decrease to 50 ml/hour to provide 132g CHO over 12 hours.     Plan:    -increase lantus to 28 units from 24 units daily and move to 1200 from 1400   -decrease NPH to 33 units from 45 units daily at 2000, start of TF cycle given decrease in TF rate tonight   -Novolog 1:10g CHO coverage with meals and snacks/supplements   -Novolog  high intensity sliding scale Q 4 hours   -BG monitoring TID AC, HS, 0200   -hypoglycemia protocol   -carb counting protocol   -diabetes education needs will be assessed closer to discharge.     Outpatient follow up: recommend [facility] Endocrinology,seen by Suzanne Parra in the past   Plan discussed with patient,  patient's family member, and primary team through this note.        Interval History and Assessment: interval glucose trend reviewed: BG running high in 200s despite increases in NPH and lantus.  NPO today for liver biopsy.  Not really eating much at baseline anyways. See trends:        Cycled TF rate tonight will decrease to 50 ml/hour to provide 132g CHO over 12 hours.  Prior rate was 75 ml/hour providing 197g CHO.       GFR>90. Hgb 10.3, WBC 4.8.     Current nutritional intake and type: Orders Placed This Encounter      NPO per Anesthesia Guidelines for Procedure/Surgery Except for: Meds, Other; Specify: Tube feeds  Two Alli HN at 75 ml/hr cycled 8pm-8am provides 197 g CHO      Planned Procedures/surgeries: liver biopsy today  Steroid planning: pred 5 mg in M  D5W-containing solutions/medications: none    PTA Diabetes Regimen:   Diabetes Type and Duration:   T2DM diagnosed 10/31/2023 with A1c 8.1%.  GAD65 negative 11/3/2023, IA-2 negative 11/3/2023, C -peptide normal (3.7) 10/31/2023.       Diabetes Medications:                   1) Tresiba 36 units once daily    2) Humalog 15 units with breakfast and lunch, 13 units with dinner plus correction scale 1 unit per 35 >175 mg/dL and 1 unit per 35 >210 at bedtime.   Historical Diabetes Medications: none, reports outpatient diabetes educator was looking into an insulin pump.     Using dexcom G7       Discharge Planning: TBD           Diabetes History:   Type of Diabetes: Type 2 Diabetes Mellitus  Lab Results   Component Value Date    A1C 4.8 02/25/2024    A1C 7.7 12/19/2023    A1C 8.1 10/31/2023    A1C 5.1 08/23/2023    A1C 5.7 03/12/2022              Review of Systems:     The Review of Systems is negative other than noted in the Interval History.           Medications:     Current Facility-Administered Medications   Medication    acetaminophen (TYLENOL) tablet 650 mg    aspirin (ASA) tablet 325 mg    bisacodyl (DULCOLAX) suppository 10 mg    bumetanide (BUMEX) tablet 2 mg    dextrose 10% infusion    glucose gel 15-30 g    Or    dextrose 50 % injection 25-50 mL    Or    glucagon injection 1 mg    insulin aspart (NovoLOG) injection (RAPID ACTING)    insulin aspart (NovoLOG) injection (RAPID ACTING)    insulin aspart (NovoLOG) injection (RAPID ACTING)    insulin glargine (LANTUS PEN) injection 24 Units    insulin NPH injection 45 Units    Lidocaine (LIDOCARE) 4 % Patch 2 patch     "magnesium oxide (MAG-OX) tablet 800 mg    melatonin tablet 10 mg    micafungin (MYCAMINE) 100 mg in sodium chloride 0.9 % 100 mL intermittent infusion    multivitamin w/minerals (THERA-VIT-M) tablet 1 tablet    mycophenolate (GENERIC EQUIVALENT) capsule 750 mg    Or    mycophenolate (CELLCEPT BRAND) suspension 750 mg    naloxone (NARCAN) injection 0.2 mg    Or    naloxone (NARCAN) injection 0.4 mg    Or    naloxone (NARCAN) injection 0.2 mg    Or    naloxone (NARCAN) injection 0.4 mg    nicotine (NICODERM CQ) 7 MG/24HR 24 hr patch 1 patch    oxyCODONE IR (ROXICODONE) half-tab 2.5 mg    pantoprazole (PROTONIX) EC tablet 40 mg    polyethylene glycol (MIRALAX) Packet 17 g    potassium chloride selena ER (KLOR-CON M10) CR tablet 20 mEq    Or    potassium chloride (KAYCIEL) solution 20 mEq    predniSONE (DELTASONE) tablet 5 mg    protein modular (PROSOURCE TF20) packet 1 packet    QUEtiapine (SEROquel) tablet 50 mg    Reason beta blocker order not selected    senna-docusate (SENOKOT-S/PERICOLACE) 8.6-50 MG per tablet 1 tablet    sodium chloride (PF) 0.9% PF flush 3 mL    sodium chloride (PF) 0.9% PF flush 3 mL    sodium chloride (PF) 0.9% PF flush 3 mL    sulfamethoxazole-trimethoprim (BACTRIM) 400-80 MG per tablet 1 tablet    tacrolimus (GENERIC) suspension 7 mg    thiamine (B-1) tablet 100 mg    traZODone (DESYREL) half-tab 25 mg    ursodiol (ACTIGALL) capsule 300 mg    Or    ursodiol (ACTIGALL) suspension 300 mg    valGANciclovir (VALCYTE) tablet 450 mg    Or    valGANciclovir (VALCYTE) solution 450 mg            Physical Exam:    /69 (BP Location: Left arm)   Pulse 85   Temp 97.4  F (36.3  C) (Axillary)   Resp 16   Ht 1.73 m (5' 8.11\")   Wt 93.6 kg (206 lb 6.4 oz)   SpO2 98%   BMI 31.28 kg/m    General: pleasant, in no distress.Sitting in chair.   HEENT: normocephalic, atraumatic. Oral mucous membranes moist.   Lungs: unlabored respiration, no cough  ABD: rounded, nondistended  Skin: warm and dry, no " obvious lesions  MSK:  moves all extremities  Lymp:  no LE edema   Mental status:  alert, confused.  Psych:  affect, calm and appropriate interaction             Data:     Recent Labs   Lab 03/15/24  0501 03/15/24  0403 03/14/24  2355 03/14/24  2201 03/14/24 2017 03/14/24  1609   * 242* 226* 185* 202* 385*     Lab Results   Component Value Date    WBC 4.8 03/15/2024    WBC 3.3 (L) 03/14/2024    WBC 6.0 03/13/2024    HGB 10.3 (L) 03/15/2024    HGB 8.5 (L) 03/14/2024    HGB 9.6 (L) 03/13/2024    HCT 32.9 (L) 03/15/2024    HCT 26.7 (L) 03/14/2024    HCT 29.2 (L) 03/13/2024    MCV 99 03/15/2024    MCV 97 03/14/2024    MCV 96 03/13/2024     (L) 03/15/2024     (L) 03/14/2024     (L) 03/13/2024     Lab Results   Component Value Date     03/15/2024     (L) 03/14/2024     03/13/2024    POTASSIUM 4.8 03/15/2024    POTASSIUM 4.8 03/14/2024    POTASSIUM 4.1 03/13/2024    CHLORIDE 99 03/15/2024    CHLORIDE 99 03/14/2024    CHLORIDE 101 03/13/2024    CO2 26 03/15/2024    CO2 29 03/14/2024    CO2 29 03/13/2024     (H) 03/15/2024     (H) 03/15/2024     (H) 03/14/2024     Lab Results   Component Value Date    BUN 24.1 (H) 03/15/2024    BUN 26.0 (H) 03/14/2024    BUN 22.2 (H) 03/13/2024     Lab Results   Component Value Date    TSH 2.18 03/12/2022     Lab Results   Component Value Date     (H) 03/15/2024    AST 86 (H) 03/14/2024    AST 76 (H) 03/13/2024     (H) 03/15/2024    ALT 85 (H) 03/14/2024    ALT 81 (H) 03/13/2024    ALKPHOS 239 (H) 03/15/2024    ALKPHOS 117 03/14/2024    ALKPHOS 110 03/13/2024           50 minutes spent by me on the date of the encounter doing chart review, history and exam, documentation and further activities per the note          To contact Endocrine Diabetes service:   From 8AM-4PM: page inpatient diabetes provider that is following the patient  For questions or updates from 4PM-8AM: page the diabetes job code for on call  fellow: [phone]    LESA Jaramillo, CNP  Inpatient Diabetes Management Service  Pager [phone]

## 2024-03-15 NOTE — IR NOTE
Patient Name: Mary Lopez  Medical Record Number: [id number]  Today's Date: 3/15/2024    Procedure: Random transplant liver biopsy  Proceduralist: MD Abisai Ladd MD  Pathology present: N/A    Procedure Start: 1330  Procedure end: 1340  Sedation medications administered: Local only    Report given to: Rosie ARMENDARIZ RN  : N/A    Other Notes: Pt arrived to IR room 6 from . Consent reviewed. Pt denies any questions or concerns regarding procedure. Pt positioned supine and monitored per protocol. Pt tolerated procedure without any noted complications. Pt transferred back to .

## 2024-03-15 NOTE — PROGRESS NOTES
Transplant Surgery  Inpatient Daily Progress Note  03/15/2024    Assessment & Plan: Mary Lopez is a 53 year old male with a history of EtOH cirrhosis, complicated by encephalopathy, hyponatremia, chronic thrombocytopenia, macrocytic anemia, with hospitalization (2024) for encephalopathy and suspected SBP, and recent admission -3/2 for Strep bovis bacteremia. He is now s/p a  donor liver transplant on 3/5/24 with biliary stent placement with Dr. Cortez. Pre-op MELD 31.    Liver transplant w/ stent: POD 10   Transaminases up more today,  (86),  (85). Total Bilirubin 1.8 (1.4). 3/11 HIDA negative for leak. Jose Roberto BID. ASA 325mg daily.  -Will plan for liver biopsy today in IR to rule out rejection.    Immunosuppressed due to medications:   Induction: Solu-Medrol/pred taper  Maintenance:  MMF: 750 mg BID  Tac goal 8-10, increased to 7 mg BID  Pred 5 while tac <8    Neurology:   Acute postoperative pain:  Fair control.  -Oxycodone, weaning. Concern for opioid-induced hyperalgesia.  -Scheduled APAP  -Lidoderm  Delirium: Slept last night with Seroquel 50 mg HS. Promote day/night sleep pattern.    Hematology:   Acute blood loss anemia: Hgb 10.3 Stable.  Thrombocytopenia: Due to liver disease, Plts 100.    Cardiorespiratory:   Bilateral pleural effusions: 3/7 CXR L pleural effusion stable, R pleural effusion slightly improved.  Suspected JAYLIN: Sleep study previously recommended to pt. STOP-BANG score 6/8 in 10/2023. Requiring O2 overnight only.  Atrial fib RVR: Onset 3/11 with rate 180s in setting of hypoK and hypoMg. Converted to NSR with metoprolol. Continue telemetry.  Wide complex tachycardia: VT vs AF with aberrant conduction. 17 beats on 3/12. Asymptomatic. Continue telemetry.    GI/Nutrition:   Severe malnutrition in the context of acute illness: Continue regular diet and cycled tube feeds. Minimal oral intake. NPO since midnight for Liver Biopsy. OK to continue tube feeds  via Nasoduodenal tube. Will plan to reduce tube feed volume tonight per dietitian due to improved calorie counts from yesterday.  Diarrhea: Bowel regimen changed to PRN.    Endocrine:   Insulin-dependent type 2 diabetes mellitus w/ steroid hyperglycemia: Endo following. Insulin ggt stopped 3/13. Lantus 20 units daily. NPH 20 units daily at 2000 for tube feed hyperglycemia. Novolog Sliding scale with carb coverage and blood sugar levels- will notify endo of proposed tube feed changes.    Fluid/Electrolytes/Renal:   Hypokalemia: KCl 20meq BID while on Bumex.  Hypomagnesemia: Continue oral 800 MagOx  BID and give 2g mag sulf IV one time today  ANGEL, oliguric. Nephrology consulted, CRRT initiated 3/7-3/9. Now recovering. Cr 0.9.   Hypervolemia: Continue Bumex BID and lymphedema wraps.  Hypophosphatemia: Replace PRN <2.    : No acute issues.     Infectious disease: No acute issues.     Prophylaxis: fungal (Micafungin x14 days d/t CRRT), PJP (TMP/sulfa), CMV (valganciclovir)    Disposition: 7A, PT/OT      JOSELYN/Fellow/Resident Provider: Franchesca Aguilera PA-C [id number]    Faculty: Benedicto Barrett MD     __________________________________________________________________    Interval History: History is obtained from the electronic health record and patient     Overnight events: Patient confused. Low appetite overall- NPO today for liver biopsy. Update given to wife at bedside.    ROS:   A 10-point review of systems was negative except as noted above.    Curent Meds:   acetaminophen  650 mg Oral Q8H    aspirin  325 mg Oral or Feeding Tube Daily    bumetanide  2 mg Oral BID    insulin aspart  1-9 Units Subcutaneous Q4H    insulin aspart   Subcutaneous TID w/meals    insulin glargine  28 Units Subcutaneous Q24H    insulin NPH  45 Units Subcutaneous Q24H    lidocaine  2 patch Transdermal Q24H    magnesium oxide  800 mg Oral BID    micafungin  100 mg Intravenous Daily    multivitamin w/minerals  1 tablet Oral Daily    mycophenolate  750  "mg Oral BID IS    Or    mycophenolate  750 mg Oral or NG Tube BID IS    pantoprazole  40 mg Oral QAM AC    potassium chloride  20 mEq Oral BID    Or    potassium chloride  20 mEq Oral or NG Tube BID    predniSONE  5 mg Oral Daily    protein modular  1 packet Per Feeding Tube BID    QUEtiapine  50 mg Oral At Bedtime    sodium chloride (PF)  3 mL Intravenous Q8H    sulfamethoxazole-trimethoprim  1 tablet Oral or Feeding Tube QPM    tacrolimus  7 mg Oral or Feeding Tube BID IS    thiamine  100 mg Oral or Feeding Tube Daily    ursodiol  300 mg Oral BID    Or    ursodiol  300 mg Oral or NG Tube BID    valGANciclovir  450 mg Oral QPM    Or    valGANciclovir  450 mg Oral or NG Tube QPM       Physical Exam:     Admit Weight: 107.2 kg (236 lb 4.8 oz)    Current Vitals:   /69 (BP Location: Left arm)   Pulse 94   Temp 98.2  F (36.8  C) (Oral)   Resp 16   Ht 1.73 m (5' 8.11\")   Wt 93.6 kg (206 lb 6.4 oz)   SpO2 98%   BMI 31.28 kg/m      Vital sign ranges:    Temp:  [97.4  F (36.3  C)-98.5  F (36.9  C)] 98.2  F (36.8  C)  Pulse:  [78-94] 94  Resp:  [16] 16  BP: (106-123)/(69-82) 115/69  SpO2:  [95 %-100 %] 98 %  Patient Vitals for the past 24 hrs:   BP Temp Temp src Pulse Resp SpO2 Weight   03/15/24 1039 -- 98.2  F (36.8  C) Oral -- 16 -- --   03/15/24 0714 -- -- -- 94 -- -- --   03/15/24 0708 -- -- -- 85 -- 98 % --   03/15/24 0703 -- -- -- 87 -- 97 % --   03/15/24 0643 -- -- -- 86 -- 98 % --   03/15/24 0629 -- -- -- 85 -- 98 % --   03/15/24 0619 -- -- -- 83 -- 95 % --   03/15/24 0612 -- -- -- 94 -- -- --   03/15/24 0601 -- -- -- 88 -- 100 % --   03/15/24 0553 -- -- -- 86 -- 97 % --   03/15/24 0550 -- -- -- 88 -- 100 % --   03/15/24 0543 -- -- -- 83 -- 100 % --   03/15/24 0528 -- -- -- 82 -- 99 % --   03/15/24 0515 -- -- -- -- -- 100 % --   03/15/24 0512 -- -- -- 81 -- 100 % --   03/15/24 0506 -- -- -- 83 -- 100 % --   03/15/24 0501 -- -- -- 89 -- 100 % --   03/15/24 0500 -- -- -- 89 -- 100 % --   03/15/24 0458 -- " -- -- -- -- 100 % --   03/15/24 0454 -- -- -- -- -- 100 % --   03/15/24 0453 -- -- -- 83 -- 100 % --   03/15/24 0445 -- -- -- 82 -- 100 % --   03/15/24 0431 -- -- -- -- -- 96 % --   03/15/24 0420 -- -- -- 81 -- 99 % --   03/15/24 0415 -- -- -- 80 -- 100 % --   03/15/24 0404 115/69 97.4  F (36.3  C) Axillary 83 16 100 % --   03/15/24 0315 -- -- -- 81 -- 100 % --   03/15/24 0300 -- -- -- 82 -- 100 % --   03/15/24 0257 -- -- -- 83 -- -- --   03/15/24 0254 -- -- -- 82 -- -- --   03/15/24 0246 -- -- -- 87 -- -- --   03/15/24 0123 -- -- -- -- -- -- 93.6 kg (206 lb 6.4 oz)   03/14/24 2305 115/74 98.5  F (36.9  C) Oral 86 16 99 % --   03/14/24 2015 121/82 -- -- 78 -- 100 % --   03/14/24 2012 121/82 97.6  F (36.4  C) Oral 81 16 100 % --   03/14/24 1645 123/79 97.8  F (36.6  C) Oral 89 16 100 % --   03/14/24 1238 106/71 97.8  F (36.6  C) Oral 91 16 97 % --     General Appearance: NAD  Skin: normal, warm  Heart: NSR  Lungs: Unlabored, RA, Lung sounds clear  Abdomen: Soft, appropriately tender, ND,  Incision CDI., has a ostomy bag in place over old TIMOTHY drain site to collect output.   : voiding with good urine output  Extremities: edema: bilateral lower extremity 2+   Neurologic: A&Ox2. Tremors present in bilateral hands.     Frailty Scores          12/19/2023   Frailty Scores   Final Score Frail   Final Score Number 4       Data:   CMP  Recent Labs   Lab 03/15/24  0843 03/15/24  0501 03/14/24  0601 03/14/24  0449 03/11/24  1821 03/11/24  1738 03/09/24  0457 03/09/24  0405   NA  --  135  --  134*   < > 136   < > 137   POTASSIUM  --  4.8  --  4.8   < > 3.3*   < > 4.1   CHLORIDE  --  99  --  99   < > 99   < > 104   CO2  --  26  --  29   < > 31*   < > 25   * 263*   < > 367*   < > 136*   < > 222*   BUN  --  24.1*  --  26.0*   < > 26.8*   < > 30.0*   CR  --  0.85  --  0.84   < > 0.76   < > 1.06   GFRESTIMATED  --  >90  --  >90   < > >90   < > 84   MEG  --  9.2  --  8.4*   < > 8.5*   < > 7.9*   ICAW  --   --   --   --   --   4.9  --  4.6   MAG  --  1.5*  --  1.5*   < > 1.8   < > 2.2   PHOS  --  2.6  --  3.0   < > 2.2*   < > 4.4   ALBUMIN  --  2.9*  --  2.4*   < >  --    < > 3.1*   BILITOTAL  --  1.8*  --  1.4*   < >  --    < > 1.7*   ALKPHOS  --  239*  --  117   < >  --    < > 79   AST  --  152*  --  86*   < >  --    < > 43   ALT  --  146*  --  85*   < >  --    < > 103*    < > = values in this interval not displayed.     CBC  Recent Labs   Lab 03/15/24  0501 03/14/24  0449   HGB 10.3* 8.5*   WBC 4.8 3.3*   * 100*     COAGS  Recent Labs   Lab 03/11/24  0420 03/10/24  0656   INR 1.18* 1.30*      Urinalysis  Recent Labs   Lab Test 03/04/24  1042 02/23/24  1558   COLOR Yellow Yellow   APPEARANCE Clear Clear   URINEGLC 500* Negative   URINEBILI Negative Small*   URINEKETONE Negative Negative   SG 1.007 1.009   UBLD Negative Negative   URINEPH 5.0 5.0   PROTEIN Negative Negative   NITRITE Negative Negative   LEUKEST Negative Negative   RBCU <1 <1   WBCU <1 <1     Virology:  Hepatitis C Antibody   Date Value Ref Range Status   03/04/2024 Nonreactive Nonreactive Final     Comment:     A nonreactive screening test result does not exclude the possibility of exposure to or infection with HCV. Nonreactive screening test results in individuals with prior exposure to HCV may be due to antibody levels below the limit of detection of this assay or lack of reactivity to the HCV antigens used in this assay. Patients with recent HCV infections (<3 months from time of exposure) may have false-negative HCV antibody results due to the time needed for seroconversion (average of 8 to 9 weeks).

## 2024-03-15 NOTE — PROCEDURES
[facility]    Procedure: IR Procedure Note    Date/Time: 3/15/2024 1:42 PM    Performed by: Tonia Ladd DO  Authorized by: Butch Barone MD      UNIVERSAL PROTOCOL   Site Marked: NA  Prior Images Obtained and Reviewed:  Yes  Required items: Required blood products, implants, devices and special equipment available    Patient identity confirmed:  Verbally with patient, arm band, provided demographic data and hospital-assigned identification number  Patient was reevaluated immediately before administering moderate or deep sedation or anesthesia  Confirmation Checklist:  Patient's identity using two indicators, relevant allergies, procedure was appropriate and matched the consent or emergent situation and correct equipment/implants were available  Time out: Immediately prior to the procedure a time out was called    Universal Protocol: the Joint Commission Universal Protocol was followed    Preparation: Patient was prepped and draped in usual sterile fashion    ESBL (mL):  1     ANESTHESIA    Anesthesia:  Local infiltration  Local Anesthetic:  Lidocaine 1% without epinephrine  Anesthetic Total (mL):  5      SEDATION    Patient Sedated: No    See dictated procedure note for full details.  Findings: 3 core samples of the transplant liver obtained.    Specimens: none    Complications: None    Condition: Stable    Plan: -2 hour bedrest      PROCEDURE    Patient Tolerance:  Patient tolerated the procedure well with no immediate complications  Length of time physician/provider present for 1:1 monitoring during sedation: 30

## 2024-03-15 NOTE — PLAN OF CARE
"/74 (BP Location: Left arm)   Pulse 86   Temp 98.5  F (36.9  C) (Oral)   Resp 16   Ht 1.73 m (5' 8.11\")   Wt 99.3 kg (219 lb)   SpO2 99%   BMI 33.19 kg/m      Shift: 1529-7150  Isolation Status: contact for VRE  VS: stable on 1L NC, afebrile  Neuro: Alert to self only   Behaviors: Confused (sitter at bedside)  BG: q4, Last blood sugar 185  Labs: ALT 85, AST 86  Respiratory: On 1L NC  Cardiac: On Tele, regular rate and rhythm  Pain/Nausea: Difficult to assess pain with confusion but pt did endorse mild pain when asked, denied nausea  PRN: none given  Diet: Regular diet, very poor appetite, on cycled tube feedings from 3876-4683 at goal rate of 75 mL/hr  IV Access: L PIV SL  Infusion(s): None  Lines/Drains: Old TIMOTHY site bagged with minimal output  GI/: Voiding, 2 loose BMs  Skin: Clamshell incision, stapled open to air  Mobility: assist 1-2 with walker and gait belt  Plan: NPO at midnight for biopsy tomorrow                         "

## 2024-03-15 NOTE — PLAN OF CARE
"/78   Pulse 85   Temp 98.2  F (36.8  C) (Oral)   Resp 16   Ht 1.73 m (5' 8.11\")   Wt 93.6 kg (206 lb 6.4 oz)   SpO2 98%   BMI 31.28 kg/m       Shift: 8847-7791  Isolation Status: contact (vre)  VS: Afebrile, VSS  Neuro: lethargic at times; oriented to self only; incoherent speech/train of thought  Behaviors: can be inappropriate at times  BG: Q4hrs, 280s-190s, carb covg oredered  Labs/Imaging: LFTs trending upwards  Respiratory: dim bases; desatted to 76% on RA while in deep sleep requiring 1-3L NC, o2 sats adequate on RA when awake  Cardiac: WDL, on tele  Pain/Nausea: denies nausea, endorses abd pain  PRN: tylneol and Oxy  Diet: cyclic TF 8p-8a, on Reg diet, calorie counts resuming tomorrow   IV Access: new  R PIV  Lines/Drains: ostomy bag over prior TIMOTHY site  GI/: no BM's this shift, voiding adequately in hat  Skin: incision stapled  Mobility: standby assist in room  Events/Education: underwent liver bx, awaiting results, VS and site WDL  Plan: awaiting biopsy results     "

## 2024-03-16 ENCOUNTER — APPOINTMENT (OUTPATIENT)
Dept: OCCUPATIONAL THERAPY | Facility: CLINIC | Age: 54
DRG: 005 | End: 2024-03-16
Attending: TRANSPLANT SURGERY
Payer: COMMERCIAL

## 2024-03-16 ENCOUNTER — APPOINTMENT (OUTPATIENT)
Dept: PHYSICAL THERAPY | Facility: CLINIC | Age: 54
DRG: 005 | End: 2024-03-16
Attending: TRANSPLANT SURGERY
Payer: COMMERCIAL

## 2024-03-16 LAB
ALBUMIN SERPL BCG-MCNC: 3 G/DL (ref 3.5–5.2)
ALP SERPL-CCNC: 329 U/L (ref 40–150)
ALT SERPL W P-5'-P-CCNC: 169 U/L (ref 0–70)
ANION GAP SERPL CALCULATED.3IONS-SCNC: 9 MMOL/L (ref 7–15)
AST SERPL W P-5'-P-CCNC: 163 U/L (ref 0–45)
BILIRUB SERPL-MCNC: 2 MG/DL
BUN SERPL-MCNC: 25.9 MG/DL (ref 6–20)
CALCIUM SERPL-MCNC: 9.5 MG/DL (ref 8.6–10)
CHLORIDE SERPL-SCNC: 99 MMOL/L (ref 98–107)
CREAT SERPL-MCNC: 0.96 MG/DL (ref 0.67–1.17)
DEPRECATED HCO3 PLAS-SCNC: 29 MMOL/L (ref 22–29)
EGFRCR SERPLBLD CKD-EPI 2021: >90 ML/MIN/1.73M2
ERYTHROCYTE [DISTWIDTH] IN BLOOD BY AUTOMATED COUNT: 20.1 % (ref 10–15)
GLUCOSE BLDC GLUCOMTR-MCNC: 183 MG/DL (ref 70–99)
GLUCOSE BLDC GLUCOMTR-MCNC: 197 MG/DL (ref 70–99)
GLUCOSE BLDC GLUCOMTR-MCNC: 216 MG/DL (ref 70–99)
GLUCOSE BLDC GLUCOMTR-MCNC: 222 MG/DL (ref 70–99)
GLUCOSE BLDC GLUCOMTR-MCNC: 234 MG/DL (ref 70–99)
GLUCOSE BLDC GLUCOMTR-MCNC: 241 MG/DL (ref 70–99)
GLUCOSE BLDC GLUCOMTR-MCNC: 242 MG/DL (ref 70–99)
GLUCOSE BLDC GLUCOMTR-MCNC: 267 MG/DL (ref 70–99)
GLUCOSE BLDC GLUCOMTR-MCNC: 268 MG/DL (ref 70–99)
GLUCOSE BLDC GLUCOMTR-MCNC: 269 MG/DL (ref 70–99)
GLUCOSE BLDC GLUCOMTR-MCNC: 289 MG/DL (ref 70–99)
GLUCOSE SERPL-MCNC: 222 MG/DL (ref 70–99)
HCT VFR BLD AUTO: 33.3 % (ref 40–53)
HGB BLD-MCNC: 10.6 G/DL (ref 13.3–17.7)
MAGNESIUM SERPL-MCNC: 1.5 MG/DL (ref 1.7–2.3)
MCH RBC QN AUTO: 31.2 PG (ref 26.5–33)
MCHC RBC AUTO-ENTMCNC: 31.8 G/DL (ref 31.5–36.5)
MCV RBC AUTO: 98 FL (ref 78–100)
PHOSPHATE SERPL-MCNC: 3 MG/DL (ref 2.5–4.5)
PLATELET # BLD AUTO: 195 10E3/UL (ref 150–450)
POTASSIUM SERPL-SCNC: 5.5 MMOL/L (ref 3.4–5.3)
POTASSIUM SERPL-SCNC: 5.9 MMOL/L (ref 3.4–5.3)
POTASSIUM SERPL-SCNC: 5.9 MMOL/L (ref 3.4–5.3)
POTASSIUM SERPL-SCNC: 6 MMOL/L (ref 3.4–5.3)
POTASSIUM SERPL-SCNC: 6 MMOL/L (ref 3.4–5.3)
POTASSIUM SERPL-SCNC: 6.2 MMOL/L (ref 3.4–5.3)
PROT SERPL-MCNC: 5.7 G/DL (ref 6.4–8.3)
RBC # BLD AUTO: 3.4 10E6/UL (ref 4.4–5.9)
SODIUM SERPL-SCNC: 137 MMOL/L (ref 135–145)
TACROLIMUS BLD-MCNC: 7.9 UG/L (ref 5–15)
TME LAST DOSE: NORMAL H
TME LAST DOSE: NORMAL H
WBC # BLD AUTO: 5.8 10E3/UL (ref 4–11)

## 2024-03-16 PROCEDURE — 250N000013 HC RX MED GY IP 250 OP 250 PS 637: Performed by: NURSE PRACTITIONER

## 2024-03-16 PROCEDURE — 99233 SBSQ HOSP IP/OBS HIGH 50: CPT | Performed by: INTERNAL MEDICINE

## 2024-03-16 PROCEDURE — 250N000012 HC RX MED GY IP 250 OP 636 PS 637

## 2024-03-16 PROCEDURE — 250N000013 HC RX MED GY IP 250 OP 250 PS 637: Performed by: TRANSPLANT SURGERY

## 2024-03-16 PROCEDURE — 258N000003 HC RX IP 258 OP 636: Performed by: NURSE PRACTITIONER

## 2024-03-16 PROCEDURE — 250N000013 HC RX MED GY IP 250 OP 250 PS 637

## 2024-03-16 PROCEDURE — 82247 BILIRUBIN TOTAL: CPT | Performed by: NURSE PRACTITIONER

## 2024-03-16 PROCEDURE — 36415 COLL VENOUS BLD VENIPUNCTURE: CPT | Performed by: PHYSICIAN ASSISTANT

## 2024-03-16 PROCEDURE — 85027 COMPLETE CBC AUTOMATED: CPT | Performed by: NURSE PRACTITIONER

## 2024-03-16 PROCEDURE — 250N000011 HC RX IP 250 OP 636: Performed by: PHYSICIAN ASSISTANT

## 2024-03-16 PROCEDURE — 80197 ASSAY OF TACROLIMUS: CPT | Performed by: NURSE PRACTITIONER

## 2024-03-16 PROCEDURE — 258N000003 HC RX IP 258 OP 636

## 2024-03-16 PROCEDURE — 120N000011 HC R&B TRANSPLANT UMMC

## 2024-03-16 PROCEDURE — 97116 GAIT TRAINING THERAPY: CPT | Mod: GP

## 2024-03-16 PROCEDURE — 250N000011 HC RX IP 250 OP 636: Mod: JZ | Performed by: NURSE PRACTITIONER

## 2024-03-16 PROCEDURE — 84100 ASSAY OF PHOSPHORUS: CPT | Performed by: NURSE PRACTITIONER

## 2024-03-16 PROCEDURE — 99232 SBSQ HOSP IP/OBS MODERATE 35: CPT | Mod: 24 | Performed by: TRANSPLANT SURGERY

## 2024-03-16 PROCEDURE — 250N000012 HC RX MED GY IP 250 OP 636 PS 637: Performed by: NURSE PRACTITIONER

## 2024-03-16 PROCEDURE — 83735 ASSAY OF MAGNESIUM: CPT | Performed by: NURSE PRACTITIONER

## 2024-03-16 PROCEDURE — 36415 COLL VENOUS BLD VENIPUNCTURE: CPT

## 2024-03-16 PROCEDURE — 84132 ASSAY OF SERUM POTASSIUM: CPT

## 2024-03-16 PROCEDURE — 250N000013 HC RX MED GY IP 250 OP 250 PS 637: Performed by: PHYSICIAN ASSISTANT

## 2024-03-16 PROCEDURE — 84132 ASSAY OF SERUM POTASSIUM: CPT | Performed by: PHYSICIAN ASSISTANT

## 2024-03-16 PROCEDURE — 97530 THERAPEUTIC ACTIVITIES: CPT | Mod: GP

## 2024-03-16 PROCEDURE — 250N000011 HC RX IP 250 OP 636

## 2024-03-16 PROCEDURE — 97535 SELF CARE MNGMENT TRAINING: CPT | Mod: GO | Performed by: OCCUPATIONAL THERAPIST

## 2024-03-16 PROCEDURE — 258N000003 HC RX IP 258 OP 636: Performed by: PHYSICIAN ASSISTANT

## 2024-03-16 PROCEDURE — 36415 COLL VENOUS BLD VENIPUNCTURE: CPT | Performed by: NURSE PRACTITIONER

## 2024-03-16 PROCEDURE — 250N000009 HC RX 250: Performed by: NURSE PRACTITIONER

## 2024-03-16 RX ORDER — PANTOPRAZOLE SODIUM 40 MG/1
40 TABLET, DELAYED RELEASE ORAL DAILY
Status: DISCONTINUED | OUTPATIENT
Start: 2024-03-17 | End: 2024-03-21 | Stop reason: HOSPADM

## 2024-03-16 RX ORDER — NICOTINE POLACRILEX 4 MG
15-30 LOZENGE BUCCAL
Status: DISCONTINUED | OUTPATIENT
Start: 2024-03-16 | End: 2024-03-16

## 2024-03-16 RX ORDER — DEXTROSE MONOHYDRATE 25 G/50ML
25 INJECTION, SOLUTION INTRAVENOUS ONCE
Status: DISCONTINUED | OUTPATIENT
Start: 2024-03-16 | End: 2024-03-16

## 2024-03-16 RX ORDER — FUROSEMIDE 10 MG/ML
40 INJECTION INTRAMUSCULAR; INTRAVENOUS ONCE
Status: COMPLETED | OUTPATIENT
Start: 2024-03-16 | End: 2024-03-16

## 2024-03-16 RX ORDER — QUETIAPINE FUMARATE 25 MG/1
25 TABLET, FILM COATED ORAL AT BEDTIME
Status: DISCONTINUED | OUTPATIENT
Start: 2024-03-16 | End: 2024-03-21 | Stop reason: HOSPADM

## 2024-03-16 RX ORDER — DEXTROSE MONOHYDRATE 25 G/50ML
25-50 INJECTION, SOLUTION INTRAVENOUS
Status: DISCONTINUED | OUTPATIENT
Start: 2024-03-16 | End: 2024-03-16

## 2024-03-16 RX ORDER — ACETAMINOPHEN 325 MG/10.15ML
650 LIQUID ORAL EVERY 8 HOURS
Status: DISCONTINUED | OUTPATIENT
Start: 2024-03-16 | End: 2024-03-19

## 2024-03-16 RX ADMIN — BUMETANIDE 2 MG: 2 TABLET ORAL at 08:12

## 2024-03-16 RX ADMIN — URSODIOL 300 MG: 300 CAPSULE ORAL at 08:11

## 2024-03-16 RX ADMIN — PANTOPRAZOLE SODIUM 40 MG: 40 TABLET, DELAYED RELEASE ORAL at 08:12

## 2024-03-16 RX ADMIN — INSULIN ASPART 1 UNITS: 100 INJECTION, SOLUTION INTRAVENOUS; SUBCUTANEOUS at 10:20

## 2024-03-16 RX ADMIN — Medication 25 MG: at 02:15

## 2024-03-16 RX ADMIN — OXYCODONE HYDROCHLORIDE 2.5 MG: 5 TABLET ORAL at 04:37

## 2024-03-16 RX ADMIN — OXYCODONE HYDROCHLORIDE 2.5 MG: 5 TABLET ORAL at 20:21

## 2024-03-16 RX ADMIN — Medication 1 PACKET: at 08:13

## 2024-03-16 RX ADMIN — MYCOPHENOLATE MOFETIL 750 MG: 200 POWDER, FOR SUSPENSION ORAL at 08:11

## 2024-03-16 RX ADMIN — TACROLIMUS 7 MG: 5 CAPSULE ORAL at 17:40

## 2024-03-16 RX ADMIN — TACROLIMUS 7 MG: 5 CAPSULE ORAL at 08:11

## 2024-03-16 RX ADMIN — MAGNESIUM OXIDE TAB 400 MG (241.3 MG ELEMENTAL MG) 800 MG: 400 (241.3 MG) TAB at 22:27

## 2024-03-16 RX ADMIN — ACETAMINOPHEN 650 MG: 325 TABLET, FILM COATED ORAL at 08:12

## 2024-03-16 RX ADMIN — LIDOCAINE 2 PATCH: 4 PATCH TOPICAL at 08:13

## 2024-03-16 RX ADMIN — POTASSIUM CHLORIDE 20 MEQ: 1.5 SOLUTION ORAL at 08:11

## 2024-03-16 RX ADMIN — SODIUM CHLORIDE 500 MG: 9 INJECTION, SOLUTION INTRAVENOUS at 13:01

## 2024-03-16 RX ADMIN — THIAMINE HCL TAB 100 MG 100 MG: 100 TAB at 08:12

## 2024-03-16 RX ADMIN — SODIUM CHLORIDE 10 UNITS: 9 INJECTION, SOLUTION INTRAVENOUS at 22:27

## 2024-03-16 RX ADMIN — FUROSEMIDE 40 MG: 10 INJECTION, SOLUTION INTRAMUSCULAR; INTRAVENOUS at 15:57

## 2024-03-16 RX ADMIN — URSODIOL 300 MG: 300 CAPSULE ORAL at 19:59

## 2024-03-16 RX ADMIN — Medication 1 PACKET: at 20:03

## 2024-03-16 RX ADMIN — QUETIAPINE FUMARATE 25 MG: 25 TABLET ORAL at 22:27

## 2024-03-16 RX ADMIN — BUMETANIDE 2 MG: 2 TABLET ORAL at 16:55

## 2024-03-16 RX ADMIN — Medication 1 TABLET: at 08:15

## 2024-03-16 RX ADMIN — MICAFUNGIN SODIUM 100 MG: 50 INJECTION, POWDER, LYOPHILIZED, FOR SOLUTION INTRAVENOUS at 10:12

## 2024-03-16 RX ADMIN — VALGANCICLOVIR HYDROCHLORIDE 450 MG: 50 POWDER, FOR SOLUTION ORAL at 19:59

## 2024-03-16 RX ADMIN — ASPIRIN 325 MG ORAL TABLET 325 MG: 325 PILL ORAL at 08:12

## 2024-03-16 RX ADMIN — ACETAMINOPHEN 650 MG: 325 SOLUTION ORAL at 16:09

## 2024-03-16 RX ADMIN — MAGNESIUM OXIDE TAB 400 MG (241.3 MG ELEMENTAL MG) 800 MG: 400 (241.3 MG) TAB at 11:56

## 2024-03-16 RX ADMIN — MYCOPHENOLATE MOFETIL 750 MG: 200 POWDER, FOR SUSPENSION ORAL at 17:40

## 2024-03-16 RX ADMIN — PREDNISONE 5 MG: 5 TABLET ORAL at 08:12

## 2024-03-16 RX ADMIN — SODIUM CHLORIDE 10 UNITS: 9 INJECTION, SOLUTION INTRAVENOUS at 16:02

## 2024-03-16 ASSESSMENT — ACTIVITIES OF DAILY LIVING (ADL)
ADLS_ACUITY_SCORE: 33
ADLS_ACUITY_SCORE: 31
ADLS_ACUITY_SCORE: 33
ADLS_ACUITY_SCORE: 31
ADLS_ACUITY_SCORE: 33

## 2024-03-16 NOTE — PLAN OF CARE
"/85 (BP Location: Left arm)   Pulse 83   Temp 97.6  F (36.4  C) (Oral)   Resp 17   Ht 1.73 m (5' 8.11\")   Wt 91 kg (200 lb 9.9 oz)   SpO2 99%   BMI 30.41 kg/m      Shift: 7483-9814  Isolation Status: Contact Precautions (VRE)  VS: VSS on 2 L O2, afebrile  Neuro: Oriented to self only  Behaviors: Anxious, intermittently agitated, lethargic  BG: Q4h - 186, 155, 216  Labs/Imaging: Na 137, K 5.5 (MD notified), BUN 25.9, Mg 1.5, albumin 3.0, alk phos 329, , , Tbili 2.0, WBC 5.8, Hgb 10.6, hematocrit 33.3  Respiratory: Satting >90% on 2 L O2 via NC while asleep, continuous SpO2 monitoring in place. Pt on RA while awake.  Cardiac: WDL, Telemetry monitoring in place, Sinus Rhythm/tachycardia.  Pain/Nausea: Denies nausea. Endorses incisional/abdominal pain, PRN oxycodone x 2 + scheduled Tylenol given with some relief.  PRN: Oxycodone, trazodone (see MAR)  Diet: Regular diet, carb cts + huber cts, poor appetite. TF cycled 8p-8a at 50 mL/hr.  IV Access: R PIV saline locked  Infusion(s): N/A  Lines/Drains: NGT  GI/: Voiding spontaneously without difficulty. LBM 03/16.  Skin: Clamshell abdominal incision stapled, RON, no drainage. Old TIMOTHY site with copious drainage, dressing removed and ostomy bag placed over site.  Mobility: SBA w/IV pole. Pt refuses gait belt.  Events/Education: Liver bx completed, awaiting results. Rest promoted overnight, pt able to sleep soundly for a few hours.  Plan: Continue with plan of care and notify team with any changes.  "

## 2024-03-16 NOTE — PROGRESS NOTES
IP Diabetes Management  Daily Note           Assessment and Plan:   HPI: Mary Lopez is a 53 year old with a comorbid history of ETOH cirrhosis, encephalopathy, hyponatremia, thrombocytopenia, and insulin dependent antibody negative diabetes, s/p DDLT 3/5/24. Insulin drip since operation with high needs on steroid taper and continuous tube feeds. Inpatient diabetes management service consulted on 3/13/24 for assistance in management.       Assessment:   Insulin dependent  Diabetes Mellitus, without known complications, sub optimal control based on hyperglycemia  (A1c 4.8% (2/25/2024)  - A1c inaccurte in setting of anemia and RBC transfusions the day prior.)  ETOH cirrhosis s/p liver transplant  Tube feed related hyperglycemia  Stress related  hyperglycemia     BG were found to be elevated in the morning with reduced NPH dose for the the reduced cycled TF rate. Cycled TF rate at  50 ml/hour to provide 132g CHO over 12 hours.     Addendum 1:28 PM:  Received page form RN that patient received methylprednisolone 500 mg at 1300 for live rejection. We added NPH 15 units one time dose. Will monitor BG trends and decide if we have to increase night NPH dose or place on insulin drip.    ADDENDUM 7:24 PM  Reviewed BG trends. Patient's BG are still above 200 mg/dl. Since he received methylprednisolone 500 mg today and he will be receiving cycled tube feeds tonight, will probably would expect significant hyperglycemia. Increased NPH dose to 50 units @2000 to be given at the start of tube feeds.      As per RN Methylprednisolone dose would be 500 mg x 3 days. For now order says only one time dose.    Plan:    -Continue 28 units Lantus at 12 noon   -increase NPH to 43 units from 33 units daily at 2000, start of TF cycle.   -Novolog 1:10g CHO coverage with meals and snacks/supplements   -Novolog  high intensity sliding scale Q 4 hours   -BG monitoring q4H     -- Hold NPH if tube feeds are held  - Ensure that order is in  place for prn D10 infusion (at same ml/hr as tube feeds) with indication to start D10 infusion if NPH is administered and tube feeds are subsequently held in the following 8 hours. Run D10 for 8 hours after last NPH dose administered. Stop D10 when TFs resumed.   - Please inform diabetes team of any planned changes in tube feed rates, changes in tube feed formulation, or TF hold orders      -hypoglycemia protocol   -carb counting protocol   -diabetes education needs will be assessed closer to discharge.     Outpatient follow up: recommend [facility],seen by Suzanne Parra in the past   Plan discussed with patient,  and primary team through this note.        Interval History and Assessment: interval glucose trend reviewed: Bg running high.        Cycled TF rate at 50 ml/hour to provide 132g CHO over 12 hours.  Prior rate was 75 ml/hour providing 197g CHO.        Current nutritional intake and type: Orders Placed This Encounter      Regular Diet Adult    Planned Procedures/surgeries: liver biopsy today  Steroid planning: pred 5 mg in AM  D5W-containing solutions/medications: none    PTA Diabetes Regimen:   Diabetes Type and Duration:   T2DM diagnosed 10/31/2023 with A1c 8.1%.  GAD65 negative 11/3/2023, IA-2 negative 11/3/2023, C -peptide normal (3.7) 10/31/2023.       Diabetes Medications:                   1) Tresiba 36 units once daily    2) Humalog 15 units with breakfast and lunch, 13 units with dinner plus correction scale 1 unit per 35 >175 mg/dL and 1 unit per 35 >210 at bedtime.   Historical Diabetes Medications: none, reports outpatient diabetes educator was looking into an insulin pump.     Using dexcom G7       Discharge Planning: TBD           Diabetes History:   Type of Diabetes: Type 2 Diabetes Mellitus  Lab Results   Component Value Date    A1C 4.8 02/25/2024    A1C 7.7 12/19/2023    A1C 8.1 10/31/2023    A1C 5.1 08/23/2023    A1C 5.7 03/12/2022              Review of Systems:   Pt in pain at  the time of the visit, lethargic. PO intake very minimal according to the nurse.  Patient  The Review of Systems is negative other than noted in the Interval History.           Medications:     Current Facility-Administered Medications   Medication    acetaminophen (TYLENOL) solution 650 mg    aspirin (ASA) tablet 325 mg    bisacodyl (DULCOLAX) suppository 10 mg    bumetanide (BUMEX) tablet 2 mg    dextrose 10% infusion    glucose gel 15-30 g    Or    dextrose 50 % injection 25-50 mL    Or    glucagon injection 1 mg    insulin aspart (NovoLOG) injection (RAPID ACTING)    insulin aspart (NovoLOG) injection (RAPID ACTING)    insulin aspart (NovoLOG) injection (RAPID ACTING)    insulin glargine (LANTUS PEN) injection 28 Units    insulin NPH injection 43 Units    Lidocaine (LIDOCARE) 4 % Patch 2 patch    magnesium oxide (MAG-OX) tablet 800 mg    melatonin tablet 10 mg    methylPREDNISolone sodium succinate (solu-MEDROL) 500 mg in sodium chloride 0.9 % 114 mL intermittent infusion    micafungin (MYCAMINE) 100 mg in sodium chloride 0.9 % 100 mL intermittent infusion    multivitamin w/minerals (THERA-VIT-M) tablet 1 tablet    mycophenolate (GENERIC EQUIVALENT) capsule 750 mg    Or    mycophenolate (CELLCEPT BRAND) suspension 750 mg    naloxone (NARCAN) injection 0.2 mg    Or    naloxone (NARCAN) injection 0.4 mg    Or    naloxone (NARCAN) injection 0.2 mg    Or    naloxone (NARCAN) injection 0.4 mg    nicotine (NICODERM CQ) 7 MG/24HR 24 hr patch 1 patch    oxyCODONE IR (ROXICODONE) half-tab 2.5 mg    [START ON 3/17/2024] pantoprazole (PROTONIX) EC tablet 40 mg    polyethylene glycol (MIRALAX) Packet 17 g    potassium chloride selena ER (KLOR-CON M10) CR tablet 20 mEq    Or    potassium chloride (KAYCIEL) solution 20 mEq    protein modular (PROSOURCE TF20) packet 1 packet    QUEtiapine (SEROquel) tablet 25 mg    senna-docusate (SENOKOT-S/PERICOLACE) 8.6-50 MG per tablet 1 tablet    sodium chloride (PF) 0.9% PF flush 3 mL     "sodium chloride (PF) 0.9% PF flush 3 mL    sodium chloride (PF) 0.9% PF flush 3 mL    sulfamethoxazole-trimethoprim (BACTRIM) 400-80 MG per tablet 1 tablet    tacrolimus (GENERIC) suspension 7 mg    thiamine (B-1) tablet 100 mg    traZODone (DESYREL) half-tab 25 mg    ursodiol (ACTIGALL) capsule 300 mg    Or    ursodiol (ACTIGALL) suspension 300 mg    valGANciclovir (VALCYTE) tablet 450 mg    Or    valGANciclovir (VALCYTE) solution 450 mg            Physical Exam:    /67   Pulse 88   Temp 97.8  F (36.6  C) (Oral)   Resp 16   Ht 1.73 m (5' 8.11\")   Wt 91 kg (200 lb 9.9 oz)   SpO2 100%   BMI 30.41 kg/m    General: pleasant, in no distress.Sitting in chair.   HEENT: normocephalic, atraumatic. Oral mucous membranes moist.   Lungs: unlabored respiration, no cough  Skin: warm and dry, no obvious lesions  MSK:  moves all extremities  Lymp:  no LE edema   Mental status:  alert, confused.          Data:     Recent Labs   Lab 03/16/24  1158 03/16/24  0813 03/16/24  0440 03/16/24  0418 03/15/24  2352 03/15/24  2012   * 234* 222* 216* 155* 186*       To contact Endocrine Diabetes service:   From 8AM-4PM: page inpatient diabetes provider that is following the patient  For questions or updates from 4PM-8AM: page the diabetes job code for on call fellow: [phone]      Patient was seen and discussed with Attending Dr Spring  Patient labs, chart and imaging reviewed    Nayely Nickerson  Endocrinology Fellow  [phone]    I, Sabine Spring, was present with the fellow who participated in the service and in the documentation of the note.  I have verified the history and personally performed the physical exam and medical decision making.  I agree with the assessment and plan of care as documented in the note.     Sabine Spring MD    "

## 2024-03-16 NOTE — PROGRESS NOTES
Transplant Surgery  Inpatient Daily Progress Note  2024    Assessment & Plan: Mary Lopez is a 53 year old male with a history of EtOH cirrhosis, complicated by encephalopathy, hyponatremia, chronic thrombocytopenia, macrocytic anemia, with hospitalization (2024) for encephalopathy and suspected SBP, and recent admission -3/2 for Strep bovis bacteremia. He is now s/p a  donor liver transplant on 3/5/24 with biliary stent placement with Dr. Cortez. Pre-op MELD 31.    Liver transplant w/ stent: POD 11   Transaminases up more today,  (86),  (85). Total Bilirubin 1.8 (1.4). 3/11 HIDA negative for leak. Jose Roberto BID. ASA 325mg daily.  -Will plan for liver biopsy today in IR to rule out rejection - read pending, may need 500 solumedrol if bx shows rejection.     Immunosuppressed due to medications:   Induction: Solu-Medrol/pred taper  Maintenance:  MMF: 750 mg BID  Tac goal 8-10, increased to 7 mg BID  Discontinue pred 5, Tac now up to goal    Neurology:   Acute postoperative pain:  Fair control.  -Oxycodone, weaning. Concern for opioid-induced hyperalgesia.  -Scheduled APAP  -Lidoderm  Delirium: Slept last night with Seroquel 50 mg HS. Promote day/night sleep pattern. - may need to back off some, drowzy this AM    Hematology:   Acute blood loss anemia: Hgb 10.3 Stable.  Thrombocytopenia: Due to liver disease, Plts 100.    Cardiorespiratory:   Bilateral pleural effusions: 3/7 CXR L pleural effusion stable, R pleural effusion slightly improved.  Suspected JAYLIN: Sleep study previously recommended to pt. STOP-BANG score 6/8 in 10/2023. Requiring O2 overnight only.  Atrial fib RVR: Onset 3/11 with rate 180s in setting of hypoK and hypoMg. Converted to NSR with metoprolol. Continue telemetry.  Wide complex tachycardia: VT vs AF with aberrant conduction. 17 beats on 3/12. Asymptomatic. Continue telemetry.    GI/Nutrition:   Severe malnutrition in the context of acute illness:  Continue regular diet and cycled tube feeds. Minimal oral intake. NPO since midnight for Liver Biopsy. OK to continue tube feeds via Nasoduodenal tube. Will plan to reduce tube feed volume tonight per dietitian due to improved calorie counts from yesterday.  Diarrhea: Bowel regimen changed to PRN.    Endocrine:   Insulin-dependent type 2 diabetes mellitus w/ steroid hyperglycemia: Endo following. Insulin ggt stopped 3/13. Lantus 20 units daily. NPH 20 units daily at 2000 for tube feed hyperglycemia. Novolog Sliding scale with carb coverage and blood sugar levels- will notify endo of proposed tube feed changes.    Fluid/Electrolytes/Renal:   Hypokalemia: KCl 20meq BID while on Bumex.  Hypomagnesemia: Continue oral 800 MagOx  BID and give 2g mag sulf IV one time today  ANGEL, oliguric. Nephrology consulted, CRRT initiated 3/7-3/9. Now recovering. Cr 0.9.   Hypervolemia: Continue Bumex BID and lymphedema wraps.  Hypophosphatemia: Replace PRN <2.    : No acute issues.     Infectious disease: No acute issues.     Prophylaxis: fungal (Micafungin x14 days d/t CRRT), PJP (TMP/sulfa), CMV (valganciclovir)    Disposition: 7A, PT/OT      JOSELYN/Fellow/Resident Provider: Phil Vazquez    Faculty: Benedicto Barrett MD     __________________________________________________________________    Interval History: History is obtained from the electronic health record and patient     Overnight events: drowzy this AM, discussed need to eat more today, family to visit    ROS:   A 10-point review of systems was negative except as noted above.    Curent Meds:   acetaminophen  650 mg Oral or Feeding Tube Q8H    aspirin  325 mg Oral or Feeding Tube Daily    bumetanide  2 mg Oral BID    insulin aspart  1-9 Units Subcutaneous Q4H    insulin aspart   Subcutaneous TID w/meals    insulin glargine  28 Units Subcutaneous Q24H    insulin NPH  43 Units Subcutaneous Q24H    lidocaine  2 patch Transdermal Q24H    magnesium oxide  800 mg Oral BID    micafungin  " 100 mg Intravenous Daily    multivitamin w/minerals  1 tablet Oral Daily    mycophenolate  750 mg Oral BID IS    Or    mycophenolate  750 mg Oral or NG Tube BID IS    pantoprazole  40 mg Oral QAM AC    potassium chloride  20 mEq Oral BID    Or    potassium chloride  20 mEq Oral or NG Tube BID    protein modular  1 packet Per Feeding Tube BID    QUEtiapine  25 mg Oral At Bedtime    sodium chloride (PF)  3 mL Intravenous Q8H    sulfamethoxazole-trimethoprim  1 tablet Oral or Feeding Tube QPM    tacrolimus  7 mg Oral or Feeding Tube BID IS    thiamine  100 mg Oral or Feeding Tube Daily    ursodiol  300 mg Oral BID    Or    ursodiol  300 mg Oral or NG Tube BID    valGANciclovir  450 mg Oral QPM    Or    valGANciclovir  450 mg Oral or NG Tube QPM       Physical Exam:     Admit Weight: 107.2 kg (236 lb 4.8 oz)    Current Vitals:   /85 (BP Location: Left arm)   Pulse 88   Temp 97.6  F (36.4  C) (Oral)   Resp 17   Ht 1.73 m (5' 8.11\")   Wt 91 kg (200 lb 9.9 oz)   SpO2 99%   BMI 30.41 kg/m      Vital sign ranges:    Temp:  [97.6  F (36.4  C)-98.2  F (36.8  C)] 97.6  F (36.4  C)  Pulse:  [75-96] 88  Resp:  [16-17] 17  BP: (103-136)/(71-86) 126/85  SpO2:  [95 %-100 %] 99 %  Patient Vitals for the past 24 hrs:   BP Temp Temp src Pulse Resp SpO2 Weight   03/16/24 0614 -- -- -- 88 -- 99 % --   03/16/24 0424 126/85 -- -- 83 17 99 % 91 kg (200 lb 9.9 oz)   03/16/24 0145 -- -- -- 85 -- 98 % --   03/16/24 0130 -- -- -- 83 -- 99 % --   03/16/24 0114 -- -- -- 82 -- 99 % --   03/16/24 0100 -- -- -- 89 -- 99 % --   03/16/24 0046 -- -- -- 88 -- 99 % --   03/16/24 0034 -- -- -- 88 -- 99 % --   03/16/24 0015 -- -- -- 87 -- 100 % --   03/15/24 2355 115/75 97.6  F (36.4  C) Oral 96 16 100 % --   03/15/24 2330 -- -- -- 91 -- 100 % --   03/15/24 2316 -- -- -- 86 -- 100 % --   03/15/24 2300 -- -- -- 94 -- 100 % --   03/15/24 1848 130/82 97.6  F (36.4  C) Oral 87 16 100 % --   03/15/24 1824 -- -- -- 85 -- 98 % --   03/15/24 1700 " 121/78 -- -- 75 -- 100 % --   03/15/24 1600 136/84 -- -- -- -- -- --   03/15/24 1531 123/79 -- -- 82 -- 95 % --   03/15/24 1500 121/86 -- -- 75 -- 100 % --   03/15/24 1430 125/79 -- -- 76 -- -- --   03/15/24 1415 -- -- -- 78 -- -- --   03/15/24 1330 121/74 -- -- 79 -- 100 % --   03/15/24 1325 113/71 -- -- 81 -- 100 % --   03/15/24 1320 -- -- -- 78 -- 100 % --   03/15/24 1315 109/76 -- -- 83 -- 100 % --   03/15/24 1039 103/77 98.2  F (36.8  C) Oral 86 16 -- --     General Appearance: NAD  Skin: normal, warm  Heart: NSR  Lungs: Unlabored, RA, Lung sounds clear  Abdomen: Soft, appropriately tender, ND,  Incision CDI., has a ostomy bag in place over old TIMOTHY drain site to collect output.   : voiding with good urine output  Extremities: edema: bilateral lower extremity 2+   Neurologic: A&Ox2. Tremors present in bilateral hands.     Frailty Scores          12/19/2023   Frailty Scores   Final Score Frail   Final Score Number 4       Data:   CMP  Recent Labs   Lab 03/16/24  0813 03/16/24  0440 03/15/24  0843 03/15/24  0501 03/11/24  1821 03/11/24  1738   NA  --  137  --  135   < > 136   POTASSIUM  --  5.5*  --  4.8   < > 3.3*   CHLORIDE  --  99  --  99   < > 99   CO2  --  29  --  26   < > 31*   * 222*   < > 263*   < > 136*   BUN  --  25.9*  --  24.1*   < > 26.8*   CR  --  0.96  --  0.85   < > 0.76   GFRESTIMATED  --  >90  --  >90   < > >90   MEG  --  9.5  --  9.2   < > 8.5*   ICAW  --   --   --   --   --  4.9   MAG  --  1.5*  --  1.5*   < > 1.8   PHOS  --  3.0  --  2.6   < > 2.2*   ALBUMIN  --  3.0*  --  2.9*   < >  --    BILITOTAL  --  2.0*  --  1.8*   < >  --    ALKPHOS  --  329*  --  239*   < >  --    AST  --  163*  --  152*   < >  --    ALT  --  169*  --  146*   < >  --     < > = values in this interval not displayed.     CBC  Recent Labs   Lab 03/16/24  0440 03/15/24  0501   HGB 10.6* 10.3*   WBC 5.8 4.8    144*     COAGS  Recent Labs   Lab 03/11/24  0420 03/10/24  0656   INR 1.18* 1.30*       Urinalysis  Recent Labs   Lab Test 03/04/24  1042 02/23/24  1558   COLOR Yellow Yellow   APPEARANCE Clear Clear   URINEGLC 500* Negative   URINEBILI Negative Small*   URINEKETONE Negative Negative   SG 1.007 1.009   UBLD Negative Negative   URINEPH 5.0 5.0   PROTEIN Negative Negative   NITRITE Negative Negative   LEUKEST Negative Negative   RBCU <1 <1   WBCU <1 <1     Virology:  Hepatitis C Antibody   Date Value Ref Range Status   03/04/2024 Nonreactive Nonreactive Final     Comment:     A nonreactive screening test result does not exclude the possibility of exposure to or infection with HCV. Nonreactive screening test results in individuals with prior exposure to HCV may be due to antibody levels below the limit of detection of this assay or lack of reactivity to the HCV antigens used in this assay. Patients with recent HCV infections (<3 months from time of exposure) may have false-negative HCV antibody results due to the time needed for seroconversion (average of 8 to 9 weeks).

## 2024-03-16 NOTE — PROVIDER NOTIFICATION
"On-call paged with the following:    \"7211 BUTCH Lopez - Pt c/o abdominal pain. Oxycodone not available, Tylenol scheduled. Can we get order for PRN robaxin? Thanks! - Shanika [phone]\"    Shanika Alcaraz RN    "

## 2024-03-17 ENCOUNTER — APPOINTMENT (OUTPATIENT)
Dept: PHYSICAL THERAPY | Facility: CLINIC | Age: 54
DRG: 005 | End: 2024-03-17
Attending: TRANSPLANT SURGERY
Payer: COMMERCIAL

## 2024-03-17 LAB
ALBUMIN SERPL BCG-MCNC: 2.9 G/DL (ref 3.5–5.2)
ALP SERPL-CCNC: 357 U/L (ref 40–150)
ALT SERPL W P-5'-P-CCNC: 136 U/L (ref 0–70)
ANION GAP SERPL CALCULATED.3IONS-SCNC: 9 MMOL/L (ref 7–15)
AST SERPL W P-5'-P-CCNC: 91 U/L (ref 0–45)
BILIRUB SERPL-MCNC: 1.5 MG/DL
BUN SERPL-MCNC: 40.7 MG/DL (ref 6–20)
CALCIUM SERPL-MCNC: 9.3 MG/DL (ref 8.6–10)
CHLORIDE SERPL-SCNC: 97 MMOL/L (ref 98–107)
CREAT SERPL-MCNC: 1.24 MG/DL (ref 0.67–1.17)
DEPRECATED HCO3 PLAS-SCNC: 25 MMOL/L (ref 22–29)
EGFRCR SERPLBLD CKD-EPI 2021: 70 ML/MIN/1.73M2
ERYTHROCYTE [DISTWIDTH] IN BLOOD BY AUTOMATED COUNT: 19.7 % (ref 10–15)
GLUCOSE BLDC GLUCOMTR-MCNC: 236 MG/DL (ref 70–99)
GLUCOSE BLDC GLUCOMTR-MCNC: 238 MG/DL (ref 70–99)
GLUCOSE BLDC GLUCOMTR-MCNC: 246 MG/DL (ref 70–99)
GLUCOSE BLDC GLUCOMTR-MCNC: 250 MG/DL (ref 70–99)
GLUCOSE BLDC GLUCOMTR-MCNC: 258 MG/DL (ref 70–99)
GLUCOSE BLDC GLUCOMTR-MCNC: 278 MG/DL (ref 70–99)
GLUCOSE BLDC GLUCOMTR-MCNC: 279 MG/DL (ref 70–99)
GLUCOSE BLDC GLUCOMTR-MCNC: 304 MG/DL (ref 70–99)
GLUCOSE BLDC GLUCOMTR-MCNC: 317 MG/DL (ref 70–99)
GLUCOSE BLDC GLUCOMTR-MCNC: 346 MG/DL (ref 70–99)
GLUCOSE BLDC GLUCOMTR-MCNC: 349 MG/DL (ref 70–99)
GLUCOSE BLDC GLUCOMTR-MCNC: 369 MG/DL (ref 70–99)
GLUCOSE SERPL-MCNC: 295 MG/DL (ref 70–99)
HCT VFR BLD AUTO: 30.4 % (ref 40–53)
HGB BLD-MCNC: 10 G/DL (ref 13.3–17.7)
MAGNESIUM SERPL-MCNC: 1.5 MG/DL (ref 1.7–2.3)
MCH RBC QN AUTO: 31.9 PG (ref 26.5–33)
MCHC RBC AUTO-ENTMCNC: 32.9 G/DL (ref 31.5–36.5)
MCV RBC AUTO: 97 FL (ref 78–100)
PHOSPHATE SERPL-MCNC: 3 MG/DL (ref 2.5–4.5)
PLATELET # BLD AUTO: 234 10E3/UL (ref 150–450)
POTASSIUM SERPL-SCNC: 4.9 MMOL/L (ref 3.4–5.3)
POTASSIUM SERPL-SCNC: 5.7 MMOL/L (ref 3.4–5.3)
POTASSIUM SERPL-SCNC: 5.8 MMOL/L (ref 3.4–5.3)
PROT SERPL-MCNC: 5.5 G/DL (ref 6.4–8.3)
RBC # BLD AUTO: 3.13 10E6/UL (ref 4.4–5.9)
SODIUM SERPL-SCNC: 131 MMOL/L (ref 135–145)
TACROLIMUS BLD-MCNC: 7.9 UG/L (ref 5–15)
TME LAST DOSE: NORMAL H
TME LAST DOSE: NORMAL H
WBC # BLD AUTO: 10.3 10E3/UL (ref 4–11)

## 2024-03-17 PROCEDURE — 36415 COLL VENOUS BLD VENIPUNCTURE: CPT | Performed by: PHYSICIAN ASSISTANT

## 2024-03-17 PROCEDURE — 250N000009 HC RX 250: Performed by: NURSE PRACTITIONER

## 2024-03-17 PROCEDURE — 83735 ASSAY OF MAGNESIUM: CPT | Performed by: NURSE PRACTITIONER

## 2024-03-17 PROCEDURE — 258N000003 HC RX IP 258 OP 636

## 2024-03-17 PROCEDURE — 250N000012 HC RX MED GY IP 250 OP 636 PS 637: Performed by: NURSE PRACTITIONER

## 2024-03-17 PROCEDURE — 250N000011 HC RX IP 250 OP 636: Mod: JZ | Performed by: NURSE PRACTITIONER

## 2024-03-17 PROCEDURE — 84132 ASSAY OF SERUM POTASSIUM: CPT | Performed by: PHYSICIAN ASSISTANT

## 2024-03-17 PROCEDURE — 250N000013 HC RX MED GY IP 250 OP 250 PS 637: Performed by: TRANSPLANT SURGERY

## 2024-03-17 PROCEDURE — 97110 THERAPEUTIC EXERCISES: CPT | Mod: GP

## 2024-03-17 PROCEDURE — 36415 COLL VENOUS BLD VENIPUNCTURE: CPT

## 2024-03-17 PROCEDURE — 250N000013 HC RX MED GY IP 250 OP 250 PS 637: Performed by: NURSE PRACTITIONER

## 2024-03-17 PROCEDURE — 99232 SBSQ HOSP IP/OBS MODERATE 35: CPT | Performed by: INTERNAL MEDICINE

## 2024-03-17 PROCEDURE — 250N000012 HC RX MED GY IP 250 OP 636 PS 637: Performed by: PHYSICIAN ASSISTANT

## 2024-03-17 PROCEDURE — 36415 COLL VENOUS BLD VENIPUNCTURE: CPT | Performed by: NURSE PRACTITIONER

## 2024-03-17 PROCEDURE — 80197 ASSAY OF TACROLIMUS: CPT | Performed by: NURSE PRACTITIONER

## 2024-03-17 PROCEDURE — 258N000003 HC RX IP 258 OP 636: Performed by: PHYSICIAN ASSISTANT

## 2024-03-17 PROCEDURE — 120N000011 HC R&B TRANSPLANT UMMC

## 2024-03-17 PROCEDURE — 99207 PR NO BILLABLE SERVICE THIS VISIT: CPT | Performed by: TRANSPLANT SURGERY

## 2024-03-17 PROCEDURE — 85027 COMPLETE CBC AUTOMATED: CPT | Performed by: NURSE PRACTITIONER

## 2024-03-17 PROCEDURE — 84100 ASSAY OF PHOSPHORUS: CPT | Performed by: NURSE PRACTITIONER

## 2024-03-17 PROCEDURE — 250N000013 HC RX MED GY IP 250 OP 250 PS 637: Performed by: PHYSICIAN ASSISTANT

## 2024-03-17 PROCEDURE — 82040 ASSAY OF SERUM ALBUMIN: CPT | Performed by: NURSE PRACTITIONER

## 2024-03-17 PROCEDURE — 258N000003 HC RX IP 258 OP 636: Performed by: NURSE PRACTITIONER

## 2024-03-17 PROCEDURE — 80053 COMPREHEN METABOLIC PANEL: CPT

## 2024-03-17 PROCEDURE — 250N000011 HC RX IP 250 OP 636: Performed by: PHYSICIAN ASSISTANT

## 2024-03-17 PROCEDURE — 250N000012 HC RX MED GY IP 250 OP 636 PS 637

## 2024-03-17 RX ORDER — HYDROXYZINE HYDROCHLORIDE 25 MG/1
25 TABLET, FILM COATED ORAL EVERY 6 HOURS PRN
Status: DISCONTINUED | OUTPATIENT
Start: 2024-03-17 | End: 2024-03-21 | Stop reason: HOSPADM

## 2024-03-17 RX ORDER — HYDROXYZINE HYDROCHLORIDE 25 MG/1
50 TABLET, FILM COATED ORAL EVERY 6 HOURS PRN
Status: DISCONTINUED | OUTPATIENT
Start: 2024-03-17 | End: 2024-03-21

## 2024-03-17 RX ADMIN — BUMETANIDE 2 MG: 2 TABLET ORAL at 08:26

## 2024-03-17 RX ADMIN — TACROLIMUS 7.5 MG: 5 CAPSULE ORAL at 17:44

## 2024-03-17 RX ADMIN — INSULIN ASPART 1 UNITS: 100 INJECTION, SOLUTION INTRAVENOUS; SUBCUTANEOUS at 19:59

## 2024-03-17 RX ADMIN — MAGNESIUM OXIDE TAB 400 MG (241.3 MG ELEMENTAL MG) 800 MG: 400 (241.3 MG) TAB at 12:26

## 2024-03-17 RX ADMIN — BUMETANIDE 2 MG: 2 TABLET ORAL at 16:57

## 2024-03-17 RX ADMIN — Medication 1 PACKET: at 19:55

## 2024-03-17 RX ADMIN — ACETAMINOPHEN 650 MG: 325 SOLUTION ORAL at 15:09

## 2024-03-17 RX ADMIN — Medication 1 TABLET: at 08:26

## 2024-03-17 RX ADMIN — MYCOPHENOLATE MOFETIL 750 MG: 200 POWDER, FOR SUSPENSION ORAL at 17:44

## 2024-03-17 RX ADMIN — VALGANCICLOVIR HYDROCHLORIDE 450 MG: 50 POWDER, FOR SOLUTION ORAL at 19:54

## 2024-03-17 RX ADMIN — OXYCODONE HYDROCHLORIDE 2.5 MG: 5 TABLET ORAL at 02:02

## 2024-03-17 RX ADMIN — PANTOPRAZOLE SODIUM 40 MG: 40 TABLET, DELAYED RELEASE ORAL at 08:26

## 2024-03-17 RX ADMIN — LIDOCAINE 2 PATCH: 4 PATCH TOPICAL at 08:26

## 2024-03-17 RX ADMIN — ACETAMINOPHEN 650 MG: 325 SOLUTION ORAL at 00:29

## 2024-03-17 RX ADMIN — THIAMINE HCL TAB 100 MG 100 MG: 100 TAB at 08:26

## 2024-03-17 RX ADMIN — SODIUM CHLORIDE 500 MG: 9 INJECTION, SOLUTION INTRAVENOUS at 10:18

## 2024-03-17 RX ADMIN — QUETIAPINE FUMARATE 25 MG: 25 TABLET ORAL at 22:22

## 2024-03-17 RX ADMIN — MYCOPHENOLATE MOFETIL 750 MG: 200 POWDER, FOR SUSPENSION ORAL at 08:25

## 2024-03-17 RX ADMIN — TACROLIMUS 7 MG: 5 CAPSULE ORAL at 08:25

## 2024-03-17 RX ADMIN — ASPIRIN 325 MG ORAL TABLET 325 MG: 325 PILL ORAL at 08:26

## 2024-03-17 RX ADMIN — URSODIOL 300 MG: 300 CAPSULE ORAL at 19:54

## 2024-03-17 RX ADMIN — URSODIOL 300 MG: 300 CAPSULE ORAL at 08:25

## 2024-03-17 RX ADMIN — SODIUM CHLORIDE 10 UNITS: 9 INJECTION, SOLUTION INTRAVENOUS at 03:52

## 2024-03-17 RX ADMIN — MAGNESIUM OXIDE TAB 400 MG (241.3 MG ELEMENTAL MG) 800 MG: 400 (241.3 MG) TAB at 22:22

## 2024-03-17 RX ADMIN — ACETAMINOPHEN 650 MG: 325 SOLUTION ORAL at 08:26

## 2024-03-17 RX ADMIN — Medication 1 PACKET: at 08:27

## 2024-03-17 RX ADMIN — MICAFUNGIN SODIUM 100 MG: 50 INJECTION, POWDER, LYOPHILIZED, FOR SOLUTION INTRAVENOUS at 11:17

## 2024-03-17 ASSESSMENT — ACTIVITIES OF DAILY LIVING (ADL)
ADLS_ACUITY_SCORE: 30
ADLS_ACUITY_SCORE: 31
ADLS_ACUITY_SCORE: 30
ADLS_ACUITY_SCORE: 30
ADLS_ACUITY_SCORE: 31
ADLS_ACUITY_SCORE: 31
ADLS_ACUITY_SCORE: 30
ADLS_ACUITY_SCORE: 31
ADLS_ACUITY_SCORE: 30
ADLS_ACUITY_SCORE: 31
ADLS_ACUITY_SCORE: 30
ADLS_ACUITY_SCORE: 31
ADLS_ACUITY_SCORE: 31
ADLS_ACUITY_SCORE: 30

## 2024-03-17 NOTE — PROVIDER NOTIFICATION
Noon blood sugar, at 1227, was 369 mg/dL. Provider, Franchesca THOMPSON, notified. Awaiting endocrine recommendations.

## 2024-03-17 NOTE — PROGRESS NOTES
IP Diabetes Management  Daily Note           Assessment and Plan:   HPI: Mary Lopez is a 53 year old with a comorbid history of ETOH cirrhosis, encephalopathy, hyponatremia, thrombocytopenia, and insulin dependent antibody negative diabetes, s/p DDLT 3/5/24. Insulin drip since operation with high needs on steroid taper and continuous tube feeds. Inpatient diabetes management service consulted on 3/13/24 for assistance in management.       Assessment:   Insulin dependent  Diabetes Mellitus, without known complications, sub optimal control based on hyperglycemia  (A1c 4.8% (2/25/2024)  - A1c inaccurte in setting of anemia and RBC transfusions the day prior.)  ETOH cirrhosis s/p liver transplant  Tube feed related hyperglycemia  Stress related  hyperglycemia     Patient received increased dose of NPH yesterday night for the cycle tube feeds along with one-time dose of 15 units NPH in the afternoon for his meter prednisone 500 mg.  His blood sugars in the morning are elevated at 250 mg/dl, despite multiple corrections.     Patient received methylprednisone 500 mg at 10:30 AM today morning, endocrine team was not informed about this dose.  Given that his blood sugars are still elevated give one-time dose of NPH 15 units at 2:50 PM    Cycled TF rate at  50 ml/hour to provide 132g CHO over 12 hours.  It was changed today to   Novasource Renal, 110 g CHO.      Plan:    -Continue 28 units Lantus at 12 noon   -increase NPH to 70 units from 50 units daily at 2000, start of TF cycle.   -Novolog 1:10g CHO coverage with meals and snacks/supplements   -changed Novolog to ISF 15 , 2/30 > 140 sliding scale Q 4 hours   -NPH 15 units ordered ar 1450 for the methylprednisolone 500 mg he received today as his BG are > 350 mg/dl.   -BG monitoring q4H     -- Hold NPH if tube feeds are held  - Ensure that order is in place for prn D10 infusion (at same ml/hr as tube feeds) with indication to start D10 infusion if NPH is  administered and tube feeds are subsequently held in the following 8 hours. Run D10 for 8 hours after last NPH dose administered. Stop D10 when TFs resumed.   - Please inform diabetes team of any planned changes in tube feed rates, changes in tube feed formulation, or TF hold orders     - Please let us know at what time Steroids are given so that we can add adjust insulin accordingly.     -hypoglycemia protocol   -carb counting protocol   -diabetes education needs will be assessed closer to discharge.     Outpatient follow up: recommend [facility],seen by Suzanne Parra in the past   Plan discussed with patient,  and primary team through this note.        Interval History and Assessment: interval glucose trend reviewed: Bg running high.        Cycled TF rate at 50 ml/hour to provide 132 g CHO over 12 hours.      Current nutritional intake and type: Orders Placed This Encounter      3 Gram Potassium (low potassium) Diet    Planned Procedures/surgeries: liver biopsy today  Steroid planning: pred 5 mg in AM  D5W-containing solutions/medications: none    PTA Diabetes Regimen:   Diabetes Type and Duration:   T2DM diagnosed 10/31/2023 with A1c 8.1%.  GAD65 negative 11/3/2023, IA-2 negative 11/3/2023, C -peptide normal (3.7) 10/31/2023.       Diabetes Medications:                   1) Tresiba 36 units once daily    2) Humalog 15 units with breakfast and lunch, 13 units with dinner plus correction scale 1 unit per 35 >175 mg/dL and 1 unit per 35 >210 at bedtime.   Historical Diabetes Medications: none, reports outpatient diabetes educator was looking into an insulin pump.     Using dexcom G7    Discharge Planning: TBD           Diabetes History:   Type of Diabetes: Type 2 Diabetes Mellitus  Lab Results   Component Value Date    A1C 4.8 02/25/2024    A1C 7.7 12/19/2023    A1C 8.1 10/31/2023    A1C 5.1 08/23/2023    A1C 5.7 03/12/2022              Review of Systems:   Pt lethargic at the time of the visit.            Medications:     Current Facility-Administered Medications   Medication    acetaminophen (TYLENOL) solution 650 mg    aspirin (ASA) tablet 325 mg    bisacodyl (DULCOLAX) suppository 10 mg    bumetanide (BUMEX) tablet 2 mg    dextrose 10% infusion    glucose gel 15-30 g    Or    dextrose 50 % injection 25-50 mL    Or    glucagon injection 1 mg    insulin aspart (NovoLOG) injection (RAPID ACTING)    insulin aspart (NovoLOG) injection (RAPID ACTING)    insulin aspart (NovoLOG) injection (RAPID ACTING)    insulin glargine (LANTUS PEN) injection 28 Units    insulin NPH injection 70 Units    Lidocaine (LIDOCARE) 4 % Patch 2 patch    magnesium oxide (MAG-OX) tablet 800 mg    melatonin tablet 10 mg    methylPREDNISolone sodium succinate (solu-MEDROL) 500 mg in sodium chloride 0.9 % 114 mL intermittent infusion    micafungin (MYCAMINE) 100 mg in sodium chloride 0.9 % 100 mL intermittent infusion    multivitamin w/minerals (THERA-VIT-M) tablet 1 tablet    mycophenolate (GENERIC EQUIVALENT) capsule 750 mg    Or    mycophenolate (CELLCEPT BRAND) suspension 750 mg    naloxone (NARCAN) injection 0.2 mg    Or    naloxone (NARCAN) injection 0.4 mg    Or    naloxone (NARCAN) injection 0.2 mg    Or    naloxone (NARCAN) injection 0.4 mg    nicotine (NICODERM CQ) 7 MG/24HR 24 hr patch 1 patch    oxyCODONE IR (ROXICODONE) half-tab 2.5 mg    pantoprazole (PROTONIX) EC tablet 40 mg    polyethylene glycol (MIRALAX) Packet 17 g    protein modular (PROSOURCE TF20) packet 1 packet    QUEtiapine (SEROquel) tablet 25 mg    senna-docusate (SENOKOT-S/PERICOLACE) 8.6-50 MG per tablet 1 tablet    sodium chloride (PF) 0.9% PF flush 3 mL    sodium chloride (PF) 0.9% PF flush 3 mL    sodium chloride (PF) 0.9% PF flush 3 mL    [Held by provider] sulfamethoxazole-trimethoprim (BACTRIM) 400-80 MG per tablet 1 tablet    tacrolimus (GENERIC) suspension 7 mg    thiamine (B-1) tablet 100 mg    traZODone (DESYREL) half-tab 25 mg    ursodiol (ACTIGALL)  "capsule 300 mg    Or    ursodiol (ACTIGALL) suspension 300 mg    valGANciclovir (VALCYTE) tablet 450 mg    Or    valGANciclovir (VALCYTE) solution 450 mg            Physical Exam:    /65 (BP Location: Right arm)   Pulse 93   Temp 98.4  F (36.9  C) (Oral)   Resp 16   Ht 1.73 m (5' 8.11\")   Wt 89.7 kg (197 lb 12.8 oz)   SpO2 98%   BMI 29.98 kg/m    General: pleasant, in no distress.Sitting in chair.   HEENT: normocephalic, atraumatic. Oral mucous membranes moist.   Lungs: unlabored respiration, no cough  Skin: warm and dry, no obvious lesions  MSK:  moves all extremities  Lymp:  no LE edema   Mental status:  alert, confused.          Data:     Recent Labs   Lab 03/17/24  1227 03/17/24  0736 03/17/24  0626 03/17/24  0533 03/17/24  0503 03/17/24  0427   * 250* 246* 258* 236* 295*       To contact Endocrine Diabetes service:   From 8AM-4PM: page inpatient diabetes provider that is following the patient  For questions or updates from 4PM-8AM: page the diabetes job code for on call fellow: [phone]      Patient was seen and discussed with Attending Dr Spring  Patient labs, chart and imaging reviewed    Nayely Nickerson  Endocrinology Fellow  [phone]    I, Sabine Spring, was present with the fellow who participated in the service and in the documentation of the note.  I have verified the history and personally performed the physical exam and medical decision making.  I agree with the assessment and plan of care as documented in the note.     Sabine Spring MD      "

## 2024-03-17 NOTE — PLAN OF CARE
"/70 (BP Location: Left arm)   Pulse 87   Temp 98.1  F (36.7  C) (Axillary)   Resp 16   Ht 1.73 m (5' 8.11\")   Wt 91 kg (200 lb 9.9 oz)   SpO2 97%   BMI 30.41 kg/m      Shift: 1223-8609  Isolation Status: Contact (VRE)  VS: VSS on 2 L O2, afebrile  Neuro: Oriented to self only  Behaviors: Lethargic, intermittently anxious. Sitter at bedside until 0300, wife at bedside overnight  BG: ACHS + 0200, frequent BG checks d/t K+ shifting - 236-346  Labs/Imaging: Na 131, K 5.7, Cl 97, BUN 40.7, Cr 1.24, Mg 1.5, albumin 2.9, alk phos 357, , AST 91, Tbili 1.5, WBC 10.3, Hgb 10, hematocrit 30.4, plt 234  Respiratory: Satting >90% on 2 L O2 via NC while asleep, continuous SpO2 monitoring in place. Pt on RA while awake.  Cardiac: WDL, telemetry monitoring in place (SR/tachycardia)  Pain/Nausea: Denies nausea. Endorses abdominal/incisional pain, managed with scheduled Tylenol + PRN oxycodone x 2  PRN: Oxycodone (see MAR)  Diet: Regular diet, carb cts + huber cts, poor appetite. TF cycled 8p-8a at 50 mL/hr.  IV Access: R PIV saline locked  Infusion(s): Insulin 10 U x 2 (K+ shifting)  Lines/Drains: NGT. Old TIMOTHY site covered w/ostomy bag, 310 mL output, dark red/brown in color.  GI/: Voiding spontaneously without difficulty. BM x 2 overnight.  Skin: Clamshell abdominal incision stapled, RON, no drainage. Old TIMOTHY site covered w/ostomy bag, dressing CDI.  Mobility: SBA w/IV pole  Events/Education: Rest promoted overnight. Wife remained at bedside, attentive to pt/involved in care. Trial off bedside attendant at 0300.  Plan: Continue with plan of care and notify team with any changes.  "

## 2024-03-17 NOTE — PROVIDER NOTIFICATION
"On-call paged with the following:    \"[id number] BUTCH Lopez - FYI pt hyperglycemic, most recent check 346 @ 0130. Next dose of Novolog not until 0400. Do we need to treat BG before 0400? Please advise. Thanks! - Shanika [phone]\"    0145: MD ordered one-time dose of Novolog 9 units.    Shanika Alcaraz RN    "

## 2024-03-17 NOTE — PROGRESS NOTES
Transplant Surgery  Inpatient Daily Progress Note  2024    Assessment & Plan: Mary Lopez is a 53 year old male with a history of EtOH cirrhosis, complicated by encephalopathy, hyponatremia, chronic thrombocytopenia, macrocytic anemia, with hospitalization (2024) for encephalopathy and suspected SBP, and recent admission -3/2 for Strep bovis bacteremia. He is now s/p a  donor liver transplant on 3/5/24 with biliary stent placement with Dr. Cortez. Pre-op MELD 31.    Liver transplant w/ stent: POD 12   3/15 liver biopsy showed Moderate-to-severe acute cellular/T cell-mediated rejection, PRAJAPATI = 6-7 /9 Solu-medrol 500 mg daily x 3 starting 3/16. Liver tests showing improvement today.  3/11 HIDA negative for leak. Jose Roberto BID. ASA 325mg daily.    Immunosuppressed due to medications:   Induction: Solu-Medrol/pred taper  Maintenance:  MMF: 750 mg BID  Tac goal 8-10  Discontinue pred 5, Tac now up to goal    Neurology:   Acute postoperative pain:  Fair control.  -Oxycodone, weaning. Concern for opioid-induced hyperalgesia.  -Scheduled APAP  -Lidoderm  Delirium: Slept last night with Seroquel 50 mg HS- now decreased dose to 25 mg. Promote day/night sleep pattern.    Hematology:   Acute blood loss anemia: Hgb ~10 Stable.  Thrombocytopenia: Due to liver disease, resolved.     Cardiorespiratory:   Bilateral pleural effusions: 3/7 CXR L pleural effusion stable, R pleural effusion slightly improved.  Suspected JAYLIN: Sleep study previously recommended to pt. STOP-BANG score 6/8 in 10/2023. Requiring O2 overnight only.  Atrial fib RVR: Onset 3/11 with rate 180s in setting of hypoK and hypoMg. Converted to NSR with metoprolol.   Wide complex tachycardia: VT vs AF with aberrant conduction. 17 beats on 3/12. Asymptomatic.     GI/Nutrition:   Severe malnutrition in the context of acute illness: Continue regular diet and cycled tube feeds. Minimal oral intake. Will plan to switch tube feeds to renal  formula due to hyperkalemia.  Diarrhea: Bowel regimen changed to PRN.    Endocrine:   Insulin-dependent type 2 diabetes mellitus w/ steroid hyperglycemia: Endo following. Insulin ggt stopped 3/13. Lantus + NPH + Novolog Sliding scale with carb coverage and sliding scale.    Fluid/Electrolytes/Renal:   Hypokalemia: Now hyperkalemic, stopped supplements.  Hypomagnesemia: Continue oral 800 MagOx BID  ANGEL, oliguric. Nephrology consulted, CRRT initiated 3/7-3/9. Now recovering. Cr 1.2 (0.9)  Hypervolemia: Continue Bumex BID and lymphedema wraps.  Hypophosphatemia: Replace PRN <2.    : No acute issues.     Infectious disease: No acute issues.     Prophylaxis: fungal (Micafungin x14 days d/t CRRT), PJP (TMP/sulfa), CMV (valganciclovir)    Disposition: 7A, PT/OT      JOSELYN/Fellow/Resident Provider: Franchesca Aguilera PA-C    Faculty: Benedicto Barrett MD     __________________________________________________________________    Interval History: History is obtained from the electronic health record and patient     Overnight events: Mental status clearing    ROS:   A 10-point review of systems was negative except as noted above.    Curent Meds:   acetaminophen  650 mg Oral or Feeding Tube Q8H    aspirin  325 mg Oral or Feeding Tube Daily    bumetanide  2 mg Oral BID    insulin aspart  1-16 Units Subcutaneous Q4H    insulin aspart   Subcutaneous TID w/meals    insulin glargine  28 Units Subcutaneous Q24H    insulin NPH  70 Units Subcutaneous Q24H    lidocaine  2 patch Transdermal Q24H    magnesium oxide  800 mg Oral BID    methylPREDNISolone  500 mg Intravenous Q24H    micafungin  100 mg Intravenous Daily    multivitamin w/minerals  1 tablet Oral Daily    mycophenolate  750 mg Oral BID IS    Or    mycophenolate  750 mg Oral or NG Tube BID IS    pantoprazole  40 mg Oral Daily    protein modular  1 packet Per Feeding Tube BID    QUEtiapine  25 mg Oral At Bedtime    sodium chloride (PF)  3 mL Intravenous Q8H    [Held by provider]  "sulfamethoxazole-trimethoprim  1 tablet Oral or Feeding Tube QPM    tacrolimus  7 mg Oral or Feeding Tube BID IS    thiamine  100 mg Oral or Feeding Tube Daily    ursodiol  300 mg Oral BID    Or    ursodiol  300 mg Oral or NG Tube BID    valGANciclovir  450 mg Oral QPM    Or    valGANciclovir  450 mg Oral or NG Tube QPM       Physical Exam:     Admit Weight: 107.2 kg (236 lb 4.8 oz)    Current Vitals:   /65 (BP Location: Right arm)   Pulse 93   Temp 98.4  F (36.9  C) (Oral)   Resp 16   Ht 1.73 m (5' 8.11\")   Wt 89.7 kg (197 lb 12.8 oz)   SpO2 98%   BMI 29.98 kg/m      Vital sign ranges:    Temp:  [97.8  F (36.6  C)-98.4  F (36.9  C)] 98.4  F (36.9  C)  Pulse:  [] 93  Resp:  [16] 16  BP: (105-113)/(65-75) 113/65  SpO2:  [97 %-98 %] 98 %  Patient Vitals for the past 24 hrs:   BP Temp Temp src Pulse Resp SpO2 Weight   03/17/24 1402 113/65 98.4  F (36.9  C) Oral 93 16 98 % --   03/17/24 0937 105/75 98  F (36.7  C) Oral 98 16 98 % --   03/17/24 0741 -- -- -- -- -- -- 89.7 kg (197 lb 12.8 oz)   03/17/24 0622 111/70 98.1  F (36.7  C) Axillary 87 16 97 % --   03/16/24 2255 108/72 97.8  F (36.6  C) Oral 100 16 98 % --     General Appearance: NAD  Skin: normal, warm  Heart: NSR  Lungs: Unlabored, RA  Abdomen: Soft, appropriately tender, ND,  Incision CDI, has a ostomy bag in place over old TIMOTHY drain site to collect output (dark drainage)  : voiding with good urine output  Extremities: edema: bilateral lower extremity 2+   Neurologic: A&Ox2. Tremors present in bilateral hands.     Frailty Scores          12/19/2023   Frailty Scores   Final Score Frail   Final Score Number 4       Data:   CMP  Recent Labs   Lab 03/17/24  1227 03/17/24  0736 03/17/24  0503 03/17/24  0427 03/17/24  0408 03/17/24  0232 03/16/24  0813 03/16/24  0440 03/11/24  1821 03/11/24  1738   NA  --   --   --  131*  --   --   --  137   < > 136   POTASSIUM  --   --   --  5.7*  --  5.8*   < > 5.5*   < > 3.3*   CHLORIDE  --   --   --  97*  --  "  --   --  99   < > 99   CO2  --   --   --  25  --   --   --  29   < > 31*   * 250*   < > 295*   < > 349*   < > 222*   < > 136*   BUN  --   --   --  40.7*  --   --   --  25.9*   < > 26.8*   CR  --   --   --  1.24*  --   --   --  0.96   < > 0.76   GFRESTIMATED  --   --   --  70  --   --   --  >90   < > >90   MEG  --   --   --  9.3  --   --   --  9.5   < > 8.5*   ICAW  --   --   --   --   --   --   --   --   --  4.9   MAG  --   --   --  1.5*  --   --   --  1.5*   < > 1.8   PHOS  --   --   --  3.0  --   --   --  3.0   < > 2.2*   ALBUMIN  --   --   --  2.9*  --   --   --  3.0*   < >  --    BILITOTAL  --   --   --  1.5*  --   --   --  2.0*   < >  --    ALKPHOS  --   --   --  357*  --   --   --  329*   < >  --    AST  --   --   --  91*  --   --   --  163*   < >  --    ALT  --   --   --  136*  --   --   --  169*   < >  --     < > = values in this interval not displayed.     CBC  Recent Labs   Lab 03/17/24  0427 03/16/24  0440   HGB 10.0* 10.6*   WBC 10.3 5.8    195     COAGS  Recent Labs   Lab 03/11/24  0420   INR 1.18*      Urinalysis  Recent Labs   Lab Test 03/04/24  1042 02/23/24  1558   COLOR Yellow Yellow   APPEARANCE Clear Clear   URINEGLC 500* Negative   URINEBILI Negative Small*   URINEKETONE Negative Negative   SG 1.007 1.009   UBLD Negative Negative   URINEPH 5.0 5.0   PROTEIN Negative Negative   NITRITE Negative Negative   LEUKEST Negative Negative   RBCU <1 <1   WBCU <1 <1     Virology:  Hepatitis C Antibody   Date Value Ref Range Status   03/04/2024 Nonreactive Nonreactive Final     Comment:     A nonreactive screening test result does not exclude the possibility of exposure to or infection with HCV. Nonreactive screening test results in individuals with prior exposure to HCV may be due to antibody levels below the limit of detection of this assay or lack of reactivity to the HCV antigens used in this assay. Patients with recent HCV infections (<3 months from time of exposure) may have  false-negative HCV antibody results due to the time needed for seroconversion (average of 8 to 9 weeks).

## 2024-03-17 NOTE — PLAN OF CARE
"/65 (BP Location: Right arm)   Pulse 93   Temp 98.4  F (36.9  C) (Oral)   Resp 16   Ht 1.73 m (5' 8.11\")   Wt 89.7 kg (197 lb 12.8 oz)   SpO2 98%   BMI 29.98 kg/m       Patient oriented to self. Disoriented to time, place, and situation. VS stable. Patient on room air. Patient complains of pain that is controled with scheduled Tylenol (See MAR). Patient denies nausea. BG - Q4h. Carbohydrate coverage. Urine Output - voiding adequately. Bowel Function - BM during shift. Nutrition - 3 g Potassium Diet. Calorie counts. Poor appetite. Cycled tube feeds at goal rate of 50 ml/hr via NJ tube from 2000 to 0800. PIV - saline locked. Clamshell incision - approximated, stapled, open to air. Old TIMOTHY site with ostomy bag in place. Dark red output. Activity - SBA. Bed alarm in use. Med card and lab book up to date. Plan of Care - Will continue with plan of care and notify care team of any changes.      "

## 2024-03-17 NOTE — PLAN OF CARE
"/81 (BP Location: Right arm)   Pulse 77   Temp 97.8  F (36.6  C) (Oral)   Resp 18   Ht 1.73 m (5' 8.11\")   Wt 91 kg (200 lb 9.9 oz)   SpO2 96%   BMI 30.41 kg/m       Patient oriented to self. Disoriented to time, place, and situation. VS stable. Patient on room air. Patient complains of pain that is controled with scheduled Tylenol (See MAR). Patient denies nausea. BG - Q4h. Carbohydrate coverage. Urine Output - voiding adequately. Bowel Function - BM during shift. Nutrition - Regular diet. Calorie counts. Poor appetite. Cycled tube feeds at goal rate of 50 ml/hr via NJ tube. PIV - saline locked. Clamshell incision - approximated, stapled, open to air. Old TIMOTHY site with ostomy bag in place. Activity - SBA. Sitter at bedside. Wife at bedside and attentive to patient. Med card and lab book up to date. Potassium of 6.2 mom/L. Potassium shifted. Potassium recheck scheduled for 2000. Plan of Care - Will continue with plan of care and notify care team of any changes.    "

## 2024-03-17 NOTE — PROVIDER NOTIFICATION
"On-call paged with the following:    \"7211 BUTCH Lopez - Pt w/orders for Kayciel at 2000. K has been elevated today (6.2 [shifted, recheck 5.9]). Should we still give Kayciel? Please advise. Thanks! - Shanika [phone]\"    1849: Received response from MD Barber. Kayciel held for 2000. Will recheck K to determine if further shifting needed.    Shanika Alcaraz RN    "

## 2024-03-17 NOTE — PROGRESS NOTES
Calorie Count  Intake recorded for: 3/16  Total Kcals: 0 Total Protein: 0g  Kcals from Hospital Food: 0   Protein: 0g  Kcals from Outside Food (average):0 Protein: 0g  # Meals Ordered from Kitchen: 1  # Meals Recorded: No meals recorded  # Supplements Recorded: No supplements recorded

## 2024-03-17 NOTE — PROGRESS NOTES
Nutrition Brief - Provider request to change TF to renal formula due to elevated K+    Chart reviewed, labs noted: K+: 5.7 today (been elevated since yesterday), Phos normal today.    Diet: changed to low K+ diet today + Glucerna with meals ( provides 380 mg K+ per container)  EN: Two Alli HN at 50 ml/hr x 12 hours cycle overnight ( provides additional 1464 mg K+ per day)    Interventions:  1. Changed tube feed Novasource renal, infuse at the same rate of 50 ml/hr overnight tube feeds.  Novasource Renal (or equivalent) (600 ml) provides 1200 kcal (15 kcal/kg + pending PO), 54 g pro (0.7 g/kg), 110 g CHO, 430 ml free water, and 0 g fiber daily, 567 mg K+ per day.     No changes to Prosource TF20 Modular ( receiving 2 packets per day)  FWF: No changes to free water flushes -> receiving 60 ml before and after each TF.     2. Decreased Glucerna to once daily      Forfrannie Workman RD/LD  Weekend relief,   Available on Vocera - can search by name or unit Dietitian  **Clinical Nutrition is no longer available via pager

## 2024-03-18 ENCOUNTER — APPOINTMENT (OUTPATIENT)
Dept: OCCUPATIONAL THERAPY | Facility: CLINIC | Age: 54
DRG: 005 | End: 2024-03-18
Attending: TRANSPLANT SURGERY
Payer: COMMERCIAL

## 2024-03-18 ENCOUNTER — APPOINTMENT (OUTPATIENT)
Dept: PHYSICAL THERAPY | Facility: CLINIC | Age: 54
DRG: 005 | End: 2024-03-18
Attending: TRANSPLANT SURGERY
Payer: COMMERCIAL

## 2024-03-18 LAB
ALBUMIN SERPL BCG-MCNC: 2.9 G/DL (ref 3.5–5.2)
ALP SERPL-CCNC: 334 U/L (ref 40–150)
ALT SERPL W P-5'-P-CCNC: 103 U/L (ref 0–70)
ANION GAP SERPL CALCULATED.3IONS-SCNC: 12 MMOL/L (ref 7–15)
AST SERPL W P-5'-P-CCNC: 52 U/L (ref 0–45)
BILIRUB SERPL-MCNC: 1.3 MG/DL
BUN SERPL-MCNC: 55.2 MG/DL (ref 6–20)
CALCIUM SERPL-MCNC: 9.1 MG/DL (ref 8.6–10)
CHLORIDE SERPL-SCNC: 96 MMOL/L (ref 98–107)
CREAT SERPL-MCNC: 1.49 MG/DL (ref 0.67–1.17)
DEPRECATED HCO3 PLAS-SCNC: 25 MMOL/L (ref 22–29)
EGFRCR SERPLBLD CKD-EPI 2021: 56 ML/MIN/1.73M2
ERYTHROCYTE [DISTWIDTH] IN BLOOD BY AUTOMATED COUNT: 19.9 % (ref 10–15)
GLUCOSE BLDC GLUCOMTR-MCNC: 203 MG/DL (ref 70–99)
GLUCOSE BLDC GLUCOMTR-MCNC: 209 MG/DL (ref 70–99)
GLUCOSE BLDC GLUCOMTR-MCNC: 213 MG/DL (ref 70–99)
GLUCOSE BLDC GLUCOMTR-MCNC: 225 MG/DL (ref 70–99)
GLUCOSE BLDC GLUCOMTR-MCNC: 227 MG/DL (ref 70–99)
GLUCOSE BLDC GLUCOMTR-MCNC: 233 MG/DL (ref 70–99)
GLUCOSE SERPL-MCNC: 237 MG/DL (ref 70–99)
HCT VFR BLD AUTO: 31.9 % (ref 40–53)
HGB BLD-MCNC: 10 G/DL (ref 13.3–17.7)
MAGNESIUM SERPL-MCNC: 1.6 MG/DL (ref 1.7–2.3)
MCH RBC QN AUTO: 31.1 PG (ref 26.5–33)
MCHC RBC AUTO-ENTMCNC: 31.3 G/DL (ref 31.5–36.5)
MCV RBC AUTO: 99 FL (ref 78–100)
PHOSPHATE SERPL-MCNC: 4.8 MG/DL (ref 2.5–4.5)
PLATELET # BLD AUTO: 312 10E3/UL (ref 150–450)
POTASSIUM SERPL-SCNC: 4.8 MMOL/L (ref 3.4–5.3)
PROT SERPL-MCNC: 5.5 G/DL (ref 6.4–8.3)
RBC # BLD AUTO: 3.22 10E6/UL (ref 4.4–5.9)
SODIUM SERPL-SCNC: 133 MMOL/L (ref 135–145)
TACROLIMUS BLD-MCNC: 6.9 UG/L (ref 5–15)
TME LAST DOSE: NORMAL H
TME LAST DOSE: NORMAL H
WBC # BLD AUTO: 14.6 10E3/UL (ref 4–11)

## 2024-03-18 PROCEDURE — 250N000013 HC RX MED GY IP 250 OP 250 PS 637: Performed by: PHYSICIAN ASSISTANT

## 2024-03-18 PROCEDURE — 250N000012 HC RX MED GY IP 250 OP 636 PS 637: Performed by: PHYSICIAN ASSISTANT

## 2024-03-18 PROCEDURE — 97112 NEUROMUSCULAR REEDUCATION: CPT | Mod: GP

## 2024-03-18 PROCEDURE — 250N000011 HC RX IP 250 OP 636: Mod: JZ | Performed by: NURSE PRACTITIONER

## 2024-03-18 PROCEDURE — 97530 THERAPEUTIC ACTIVITIES: CPT | Mod: GO | Performed by: OCCUPATIONAL THERAPIST

## 2024-03-18 PROCEDURE — 120N000011 HC R&B TRANSPLANT UMMC

## 2024-03-18 PROCEDURE — 85027 COMPLETE CBC AUTOMATED: CPT | Performed by: NURSE PRACTITIONER

## 2024-03-18 PROCEDURE — 97535 SELF CARE MNGMENT TRAINING: CPT | Mod: GO | Performed by: OCCUPATIONAL THERAPIST

## 2024-03-18 PROCEDURE — 250N000013 HC RX MED GY IP 250 OP 250 PS 637: Performed by: TRANSPLANT SURGERY

## 2024-03-18 PROCEDURE — 36415 COLL VENOUS BLD VENIPUNCTURE: CPT | Performed by: NURSE PRACTITIONER

## 2024-03-18 PROCEDURE — 250N000009 HC RX 250: Performed by: NURSE PRACTITIONER

## 2024-03-18 PROCEDURE — 80053 COMPREHEN METABOLIC PANEL: CPT | Performed by: NURSE PRACTITIONER

## 2024-03-18 PROCEDURE — 250N000011 HC RX IP 250 OP 636: Performed by: PHYSICIAN ASSISTANT

## 2024-03-18 PROCEDURE — 250N000012 HC RX MED GY IP 250 OP 636 PS 637: Performed by: NURSE PRACTITIONER

## 2024-03-18 PROCEDURE — 80197 ASSAY OF TACROLIMUS: CPT | Performed by: NURSE PRACTITIONER

## 2024-03-18 PROCEDURE — 258N000003 HC RX IP 258 OP 636: Performed by: NURSE PRACTITIONER

## 2024-03-18 PROCEDURE — 250N000013 HC RX MED GY IP 250 OP 250 PS 637: Performed by: NURSE PRACTITIONER

## 2024-03-18 PROCEDURE — 258N000003 HC RX IP 258 OP 636: Performed by: PHYSICIAN ASSISTANT

## 2024-03-18 PROCEDURE — 97116 GAIT TRAINING THERAPY: CPT | Mod: GP

## 2024-03-18 PROCEDURE — 83735 ASSAY OF MAGNESIUM: CPT | Performed by: NURSE PRACTITIONER

## 2024-03-18 PROCEDURE — 97530 THERAPEUTIC ACTIVITIES: CPT | Mod: GP

## 2024-03-18 PROCEDURE — 250N000012 HC RX MED GY IP 250 OP 636 PS 637: Performed by: STUDENT IN AN ORGANIZED HEALTH CARE EDUCATION/TRAINING PROGRAM

## 2024-03-18 PROCEDURE — 84100 ASSAY OF PHOSPHORUS: CPT | Performed by: NURSE PRACTITIONER

## 2024-03-18 PROCEDURE — 99232 SBSQ HOSP IP/OBS MODERATE 35: CPT | Performed by: STUDENT IN AN ORGANIZED HEALTH CARE EDUCATION/TRAINING PROGRAM

## 2024-03-18 RX ORDER — AMINO ACIDS/PROTEIN HYDROLYS 11G-40/45
1 LIQUID IN PACKET (ML) ORAL DAILY
Status: DISCONTINUED | OUTPATIENT
Start: 2024-03-19 | End: 2024-03-21

## 2024-03-18 RX ADMIN — TACROLIMUS 7.5 MG: 5 CAPSULE ORAL at 08:48

## 2024-03-18 RX ADMIN — ASPIRIN 325 MG ORAL TABLET 325 MG: 325 PILL ORAL at 08:48

## 2024-03-18 RX ADMIN — LIDOCAINE 2 PATCH: 4 PATCH TOPICAL at 08:49

## 2024-03-18 RX ADMIN — QUETIAPINE FUMARATE 25 MG: 25 TABLET ORAL at 21:51

## 2024-03-18 RX ADMIN — PANTOPRAZOLE SODIUM 40 MG: 40 TABLET, DELAYED RELEASE ORAL at 08:48

## 2024-03-18 RX ADMIN — OXYCODONE HYDROCHLORIDE 2.5 MG: 5 TABLET ORAL at 05:02

## 2024-03-18 RX ADMIN — MYCOPHENOLATE MOFETIL 750 MG: 200 POWDER, FOR SUSPENSION ORAL at 18:04

## 2024-03-18 RX ADMIN — MAGNESIUM OXIDE TAB 400 MG (241.3 MG ELEMENTAL MG) 800 MG: 400 (241.3 MG) TAB at 21:51

## 2024-03-18 RX ADMIN — Medication 1 PACKET: at 08:48

## 2024-03-18 RX ADMIN — INSULIN ASPART 3 UNITS: 100 INJECTION, SOLUTION INTRAVENOUS; SUBCUTANEOUS at 09:18

## 2024-03-18 RX ADMIN — INSULIN HUMAN 110 UNITS: 100 INJECTION, SUSPENSION SUBCUTANEOUS at 21:51

## 2024-03-18 RX ADMIN — ACETAMINOPHEN 650 MG: 325 SOLUTION ORAL at 16:27

## 2024-03-18 RX ADMIN — ACETAMINOPHEN 650 MG: 325 SOLUTION ORAL at 08:48

## 2024-03-18 RX ADMIN — MYCOPHENOLATE MOFETIL 750 MG: 200 POWDER, FOR SUSPENSION ORAL at 08:48

## 2024-03-18 RX ADMIN — URSODIOL 300 MG: 300 CAPSULE ORAL at 20:35

## 2024-03-18 RX ADMIN — MAGNESIUM OXIDE TAB 400 MG (241.3 MG ELEMENTAL MG) 800 MG: 400 (241.3 MG) TAB at 11:44

## 2024-03-18 RX ADMIN — Medication 1 TABLET: at 08:48

## 2024-03-18 RX ADMIN — MICAFUNGIN SODIUM 100 MG: 50 INJECTION, POWDER, LYOPHILIZED, FOR SOLUTION INTRAVENOUS at 11:01

## 2024-03-18 RX ADMIN — ACETAMINOPHEN 650 MG: 325 SOLUTION ORAL at 00:16

## 2024-03-18 RX ADMIN — URSODIOL 300 MG: 300 CAPSULE ORAL at 08:48

## 2024-03-18 RX ADMIN — THIAMINE HCL TAB 100 MG 100 MG: 100 TAB at 08:48

## 2024-03-18 RX ADMIN — TACROLIMUS 8 MG: 5 CAPSULE ORAL at 18:06

## 2024-03-18 RX ADMIN — OXYCODONE HYDROCHLORIDE 2.5 MG: 5 TABLET ORAL at 00:16

## 2024-03-18 RX ADMIN — VALGANCICLOVIR HYDROCHLORIDE 450 MG: 50 POWDER, FOR SOLUTION ORAL at 20:35

## 2024-03-18 RX ADMIN — BUMETANIDE 2 MG: 2 TABLET ORAL at 08:48

## 2024-03-18 RX ADMIN — SODIUM CHLORIDE 500 MG: 9 INJECTION, SOLUTION INTRAVENOUS at 09:07

## 2024-03-18 ASSESSMENT — ACTIVITIES OF DAILY LIVING (ADL)
ADLS_ACUITY_SCORE: 30

## 2024-03-18 NOTE — PROGRESS NOTES
Transplant Social Work Services Progress Note      Date of Initial Social Work Evaluation: 2024  Collaborated with: inpatient liver transplant team, outpatient transplant coordinator Lore Mckeon    Data: Mary underwent a  donor liver transplant on 3/5/2024.  Intervention: I called Mary's wife Adina and provided a review of Mary's discharge recommendations.  Assessment: Physical and occupational therapy continued to recommend Acute Rehab at discharge.  Adina wants to consider a home discharge if patient is medically stable and has progressed enough with PT/OT.  She plans to watch a therapy session tomorrow.  Education provided by SW: [facility]  Plan:    Discharge Plans in Progress: [facility]     Barriers to d/c plan: medical stability, estimated discharge date is tomorrow, bed availability at Acute Rehab    Follow up Plan: Transplant  will continue to follow for discharge planning.      CAPRI Brock, [facility]  Liver Transplant   Phone [phone]

## 2024-03-18 NOTE — PROGRESS NOTES
CLINICAL NUTRITION SERVICES - REASSESSMENT NOTE     Nutrition Prescription    RECOMMENDATIONS FOR MDs/PROVIDERS TO ORDER:  Continue supplemental EN at this time until pt is able to meet ~60% est nutrition needs via PO intakes (per calorie counts, ~1405 kcal/d and 70g pro/d)     Malnutrition Status:    Severe malnutrition in the context of acute illness/disease    Recommendations already ordered by Registered Dietitian (RD):  Modify TF regimen to better meet calorie/protein needs between TF + PO:  Novasource Renal @ 65 ml/hr x 12 hour (8p-8a) + 1 pkt Prosource TF20  -provides: 1640 kcal (22 kcal/kg), 91 grams protein (1.2g/kg), 143g CHO, 559 ml FW  -meets 73% lower end kcal needs and 80% lower end protein needs    Change oral supplements to include trial of Gelatein Plus, Magic Cup.  Continue Glucerna strawberry at least once daily.     Ongoing calorie counts 3/19-3/21    Future/Additional Recommendations:  Continue to monitor nutrition-related findings and follow pt per protocol     EVALUATION OF THE PROGRESS TOWARD GOALS   Diet: 3 gm Potassium + Glucerna @ 2 pm    Calorie counts:   3/17 - 303 kcal, 11 grams protein  3/16 - no intake recorded  3/14 - 1251 kcal, 60 grams protein  3/13 - 204 kcal, 7 grams protein    Discussed oral intake with patient's SO.  She notes patient ate better this morning than he has eaten (1/4 breakfast taco, 100% peaches).  Yesterday he drank 1 Glucerna (documented on above calorie counts) and was able to finish it. Unable to speak directly with patient, wife stepped out of the room to speak with writer.     Nutrition Support: Novasource Renal @ 50 ml/hr x 12 hours (8p-8a) + 2 pkt Prosource TF20 -> 1360 kcal (17 kcal/kg), 95 grams protein (1.2g/kg), 110 grams CHO, 430 ml free water, 0 grams fiber    -FWF 60 ml before/after each TF cycle, provides total of 550 ml free water daily    EN Intake:   Average 7-day TF intakes: 1017 Kcals (14 Kcal/kg), and 42 g pro (0.6 g/kg). This is meeting 45%  of low-end est Kcal needs, and 37% of low-end est protein needs. Suspect I/O records are inaccurate.     TF cycled on 3/12, reduced on 3/15.  TF formula changed on 3/17 d/t hyperkalemia.     NEW FINDINGS   Weights:  Most Recent Weight: 89.7 kg on 3/17 via standing scale - new lowest wt throughout admission, will adjust dosing wt to  75kg (IBW 70 kg)     Dosing Weight: 75 kg (adj wt based on IBW of 70 kg and actual wt of 102.4 kg 3/11)  ASSESSED NUTRITION NEEDS  Estimated Energy Needs: 2609-6625 kcals/day (30-35 kcals/kg)   Justification: Increased needs s/p liver tx   Estimated Protein Needs: 113 - 150 grams protein/day (1.5 - 2 grams of pro/kg)   Justification: Increased needs s/p liver tx)  Estimated Fluid Needs: per MD pending fluid status     GI: Having 3-6 BM daily     Medications:  Bumex, sliding scale insulin, novolog 1 unit per 10 grams CHO, NPH 70 units with TF cycle, Lantus 28 units daily, mag-ox 800 mg BID, MVI tablet,  mycophenolate, protonix, tacrolimus, thiamine 100 mg/d    Labs: K+ 4.8, but elevated 3/16-3/17, Na 133 (low), Phos 4.8 (elevated), Mg++ 1.6 (low), -369 within last 24 hours    MALNUTRITION  % Intake: < 75% for > 7 days (moderate)  % Weight Loss: > 2% in 1 week (severe malnutrition)  Subcutaneous Fat Loss: Facial region: mild - per previous RD assess  Muscle Loss: Temporal: moderate, Facial & jaw region: moderate, and Upper arm (bicep, tricep): moderate- per previous RD assess  Fluid Accumulation/Edema: Severe  Malnutrition Diagnosis: Severe malnutrition in the context of acute illness/disease    Previous Goals   Total avg nutritional intake to meet a minimum of 30 kcal/kg and 1.5 g PRO/kg daily (per dosing wt 78.1 kg).  Evaluation: NOT MET    Previous Nutrition Diagnosis  Inadequate oral intake  Evaluation: No change    CURRENT NUTRITION DIAGNOSIS  Inadequate oral intake related to reduced appetite, early satiety as evidenced by patient mostly eating <500 kcal/day and <15 grams  protein daily over the past several days.     INTERVENTIONS  Implementation  EN - modify rate/volume to better meet needs  Adjust oral supplements to increase oral intake   Collaborated with endo team on TF adjustments    Goals  Total avg nutritional intake to meet a minimum of 30 kcal/kg and 1.5 g PRO/kg daily (per dosing wt 78.1 kg).    Monitoring/Evaluation  Progress toward goals will be monitored and evaluated per protocol.    Fabi Bryan, MS, RD, LD, CCTD, [facility]  7A + 7B (beds 3511-9884)  Available on Vocera [7A or 7B Clinical Dietitian]   Weekend/Holiday: Vocera [Weekend Clinical Dietitian]

## 2024-03-18 NOTE — PROGRESS NOTES
Calorie Count  Intake recorded for: 3/17  Total Kcals: 303 Total Protein: 11g  Kcals from Hospital Food: 303   Protein: 11g  Kcals from Outside Food (average):0 Protein: 0g  # Meals Ordered from Kitchen: 1 meal  # Meals Recorded: 1 meal (100% grapes, 50% peaches)  # Supplements Recorded: 100% 1 Glucerna

## 2024-03-18 NOTE — PROVIDER NOTIFICATION
"\"[address] ANDREA Lopez. - Pt w/1st night w/o sitter or spouse at bedside, pt anxious/slightly agitated. Can we get PRN orders for atarax and zyprexa in case behaviors worsen? Thanks! - Shanika [phone]\"    Shanika Alcaraz RN    "

## 2024-03-18 NOTE — PLAN OF CARE
"/75 (BP Location: Left arm)   Pulse 88   Temp 97.8  F (36.6  C) (Oral)   Resp 17   Ht 1.73 m (5' 8.11\")   Wt 89.7 kg (197 lb 12.8 oz)   SpO2 100%   BMI 29.98 kg/m       Patient oriented to self and intermittently oriented to place. Disoriented to time and situation. VS stable. Patient on room air. Patient complains of pain that is controled with scheduled Tylenol (See MAR). Patient denies nausea. BG - Q4h: 227, 213, and 203. Carbohydrate coverage. Urine Output - voiding adequately. Bowel Function - BM during shift. Nutrition - 3 g Potassium Diet. Calorie counts. Poor appetite. Cycled tube feeds at goal rate of 65 ml/hr via NJ tube from 2000 to 0800. PIV - saline locked. Clamshell incision - approximated, stapled, open to air. Old TIMOTHY site with ostomy bag in place. Dark red output. Activity - SBA. Bed alarm in use. Med card and lab book up to date. Wife at bedside and attentive to patient. Plan of Care - Will continue with plan of care and notify care team of any changes. Plan for ARU when medically appropriate.       "

## 2024-03-18 NOTE — PLAN OF CARE
"/74 (BP Location: Left arm)   Pulse 95   Temp 97.9  F (36.6  C) (Oral)   Resp 16   Ht 1.73 m (5' 8.11\")   Wt 89.7 kg (197 lb 12.8 oz)   SpO2 97%   BMI 29.98 kg/m      Shift: [id number]  Isolation Status: Contact Precautions (VRE)  VS: VSS on 1 L O2, afebrile  Neuro: Oriented to self only, intermittently clear/able to make needs known. Bed alarm/chair alarm for safety/impulsivity.  Behaviors: Calm, cooperative, confused but redirectable  BG: Q4h - 238, 209, 233  Labs/Imaging: Na 133, Cl 96, BUN 55.2, Cr 1.49, Mg 1.6, Phos 4.8, albumin 2.9, alk phos 334, , AST 52, Tbili 1.3, WBC 14.6, Hgb 10, hematocrit 31.9  Respiratory: Satting >95% on 1 L O2 while asleep. Pt on RA when awake/alert.  Cardiac: Intermittently tachycardic low 100s, otherwise WDL. (EKG?)  Pain/Nausea: Denies nausea. Endorses abdominal pain, PRN oxycodone x 2 given with relief.  PRN: Oxycodone (see MAR)  Diet: 3 g K+ diet, poor appetite. TF cycled 8p-8a at 50 mL/hr.  IV Access: R PIV saline locked  Infusion(s): N/A  Lines/Drains: NGT. Old TIMOTHY site covered w/ostomy bag, minimal output.  GI/: Voiding spontaneously without difficulty. LBM 3/17.  Skin: Clamshell incision stapled, RON, no drainage. Old TIMOTHY site covered w/ostomy bag, dressing CDI.  Mobility: SBA w/IV pole  Events/Education: Rest promoted overnight. Pt up in chair x 3 hours.  Plan: Continue with plan of care and notify team with any changes.  "

## 2024-03-18 NOTE — PROGRESS NOTES
Transplant Surgery  Inpatient Daily Progress Note  2024    Assessment & Plan: Mary Lopez is a 53 year old male with a history of EtOH cirrhosis, complicated by encephalopathy, hyponatremia, chronic thrombocytopenia, macrocytic anemia, with hospitalization (2024) for encephalopathy and suspected SBP, and recent admission -3/2 for Strep bovis bacteremia. He is now s/p a  donor liver transplant on 3/5/24 with biliary stent placement with Dr. Cortez. Pre-op MELD 31.     Liver transplant w/ stent: POD 13   3/15 liver biopsy showed Moderate-to-severe acute cellular/T cell-mediated rejection, PRAJAPATI = 6-7 /9 Solu-medrol 500 mg daily x 3 starting 3/16 followed by steroid taper. Liver tests showing improvement today.  3/11 HIDA negative for leak. Jose Roberto BID. ASA 325mg daily.    Immunosuppressed due to medications:   Induction: Solu-Medrol/pred taper  Maintenance:  MMF: 750 mg BID  Tac increase to 8 mg BID, goal 8-10    Neurology:   Acute postoperative pain:  Fair control.  -Oxycodone, weaning.  -Scheduled APAP  -Lidoderm  Delirium: Slept last night with Seroquel 25 mg. Promote day/night sleep pattern.    Hematology:   Acute blood loss anemia: Hgb ~10 Stable.  Thrombocytopenia: Due to liver disease, resolved.     Cardiorespiratory:   Bilateral pleural effusions: 3/7 CXR L pleural effusion stable, R pleural effusion slightly improved.  Suspected JAYLIN: Sleep study previously recommended to pt. STOP-BANG score 6/8 in 10/2023. Requiring O2 overnight only.  Atrial fib RVR: Onset 3/11 with rate 180s in setting of hypoK and hypoMg. Converted to NSR with metoprolol.   Wide complex tachycardia: VT vs AF with aberrant conduction. 17 beats on 3/12. Asymptomatic.     GI/Nutrition:   Severe malnutrition in the context of acute illness: Continue low K diet and cycled tube feeds. Minimal oral intake.   Diarrhea: Bowel regimen changed to PRN.    Endocrine:   Insulin-dependent type 2 diabetes mellitus w/  steroid hyperglycemia: Endo following. Insulin ggt stopped 3/13. Lantus + NPH + Novolog Sliding scale with carb coverage and sliding scale.    Fluid/Electrolytes/Renal:   Hypokalemia: Now hyperkalemic, stopped supplements.  Hypomagnesemia: Continue oral 800 MagOx BID  ANGEL, oliguric. Nephrology consulted, CRRT initiated 3/7-3/9. Cr normalized, now increased to 1.5 today, will hold diuresis and monitor.   Hypervolemia: Bumex BID and lymphedema wraps, improving. Hold Bumex for now.  Hypophosphatemia: Replace PRN <2.    : No acute issues.     Infectious disease: No acute issues.     Prophylaxis: fungal (Micafungin x14 days d/t CRRT), PJP (TMP/sulfa), CMV (valganciclovir)    Disposition: 7A, PT/OT      JOSELYN/Fellow/Resident Provider: Lesley Wise PA-C    Faculty: Joanna Goldman MD     __________________________________________________________________    Interval History: History is obtained from the electronic health record and patient     Overnight events: Mental status improving, slept overnight.     ROS:   A 10-point review of systems was negative except as noted above.    Curent Meds:   acetaminophen  650 mg Oral or Feeding Tube Q8H    aspirin  325 mg Oral or Feeding Tube Daily    [Held by provider] bumetanide  2 mg Oral BID    insulin aspart  1-16 Units Subcutaneous Q4H    insulin aspart   Subcutaneous TID w/meals    insulin glargine  28 Units Subcutaneous Q24H    insulin NPH  110 Units Subcutaneous Q24H    lidocaine  2 patch Transdermal Q24H    magnesium oxide  800 mg Oral BID    [START ON 3/19/2024] methylPREDNISolone  200 mg Intravenous Once    micafungin  100 mg Intravenous Daily    multivitamin w/minerals  1 tablet Oral Daily    mycophenolate  750 mg Oral BID IS    Or    mycophenolate  750 mg Oral or NG Tube BID IS    pantoprazole  40 mg Oral Daily    [START ON 3/19/2024] protein modular  1 packet Per Feeding Tube Daily    QUEtiapine  25 mg Oral At Bedtime    sodium chloride (PF)  3 mL Intravenous Q8H     "[Held by provider] sulfamethoxazole-trimethoprim  1 tablet Oral or Feeding Tube QPM    tacrolimus  8 mg Oral or Feeding Tube BID IS    thiamine  100 mg Oral or Feeding Tube Daily    ursodiol  300 mg Oral BID    Or    ursodiol  300 mg Oral or NG Tube BID    valGANciclovir  450 mg Oral QPM    Or    valGANciclovir  450 mg Oral or NG Tube QPM       Physical Exam:     Admit Weight: 107.2 kg (236 lb 4.8 oz)    Current Vitals:   /75 (BP Location: Left arm)   Pulse 86   Temp 98.7  F (37.1  C) (Oral)   Resp 17   Ht 1.73 m (5' 8.11\")   Wt 89.7 kg (197 lb 12.8 oz)   SpO2 100%   BMI 29.98 kg/m      Vital sign ranges:    Temp:  [97.8  F (36.6  C)-98.7  F (37.1  C)] 98.7  F (37.1  C)  Pulse:  [] 86  Resp:  [16-17] 17  BP: (106-119)/(68-75) 114/75  SpO2:  [95 %-100 %] 100 %  Patient Vitals for the past 24 hrs:   BP Temp Temp src Pulse Resp SpO2   03/18/24 1439 -- 98.7  F (37.1  C) Oral 86 17 --   03/18/24 1036 114/75 97.8  F (36.6  C) Oral 88 17 100 %   03/18/24 0404 116/74 97.9  F (36.6  C) Oral 95 16 97 %   03/18/24 0002 106/68 98  F (36.7  C) Oral 88 16 96 %   03/17/24 2100 111/71 98.3  F (36.8  C) Oral 100 16 96 %   03/17/24 1833 119/69 98  F (36.7  C) Oral 89 16 95 %     General Appearance: NAD  Skin: normal, warm  Heart: NSR  Lungs: Unlabored, RA  Abdomen: Soft, appropriately tender, ND,  Incision CDI  : voiding with good urine output  Extremities: edema: bilateral lower extremity 2+   Neurologic: A&Ox1. Tremors present in bilateral hands.     Frailty Scores          12/19/2023   Frailty Scores   Final Score Frail   Final Score Number 4       Data:   CMP  Recent Labs   Lab 03/18/24  1154 03/18/24  0904 03/18/24  0541 03/17/24  1603 03/17/24  1335 03/17/24  0503 03/17/24  0427 03/11/24  1821 03/11/24  1738   NA  --   --  133*  --   --   --  131*   < > 136   POTASSIUM  --   --  4.8  --  4.9  --  5.7*   < > 3.3*   CHLORIDE  --   --  96*  --   --   --  97*   < > 99   CO2  --   --  25  --   --   --  25   < > " "31*   * 227* 237*   < >  --    < > 295*   < > 136*   BUN  --   --  55.2*  --   --   --  40.7*   < > 26.8*   CR  --   --  1.49*  --   --   --  1.24*   < > 0.76   GFRESTIMATED  --   --  56*  --   --   --  70   < > >90   MEG  --   --  9.1  --   --   --  9.3   < > 8.5*   ICAW  --   --   --   --   --   --   --   --  4.9   MAG  --   --  1.6*  --   --   --  1.5*   < > 1.8   PHOS  --   --  4.8*  --   --   --  3.0   < > 2.2*   ALBUMIN  --   --  2.9*  --   --   --  2.9*   < >  --    BILITOTAL  --   --  1.3*  --   --   --  1.5*   < >  --    ALKPHOS  --   --  334*  --   --   --  357*   < >  --    AST  --   --  52*  --   --   --  91*   < >  --    ALT  --   --  103*  --   --   --  136*   < >  --     < > = values in this interval not displayed.     CBC  Recent Labs   Lab 03/18/24  0541 03/17/24  0427   HGB 10.0* 10.0*   WBC 14.6* 10.3    234     COAGS  No lab results found in last 7 days.    Invalid input(s): \"XA\"     Urinalysis  Recent Labs   Lab Test 03/04/24  1042 02/23/24  1558   COLOR Yellow Yellow   APPEARANCE Clear Clear   URINEGLC 500* Negative   URINEBILI Negative Small*   URINEKETONE Negative Negative   SG 1.007 1.009   UBLD Negative Negative   URINEPH 5.0 5.0   PROTEIN Negative Negative   NITRITE Negative Negative   LEUKEST Negative Negative   RBCU <1 <1   WBCU <1 <1     Virology:  Hepatitis C Antibody   Date Value Ref Range Status   03/04/2024 Nonreactive Nonreactive Final     Comment:     A nonreactive screening test result does not exclude the possibility of exposure to or infection with HCV. Nonreactive screening test results in individuals with prior exposure to HCV may be due to antibody levels below the limit of detection of this assay or lack of reactivity to the HCV antigens used in this assay. Patients with recent HCV infections (<3 months from time of exposure) may have false-negative HCV antibody results due to the time needed for seroconversion (average of 8 to 9 weeks).       "

## 2024-03-18 NOTE — PROGRESS NOTES
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 Insulin drip since operation with high needs on steroid taper and continuous tube feeds. Inpatient diabetes management service consulted on 3/13/24 for assistance in management.           Assessment/Plan:     Assessment:   Insulin dependent  Diabetes Mellitus, without known complications, sub optimal control based on hyperglycemia  (A1c 4.8% (2/25/2024)  - A1c inaccurte in setting of anemia and RBC transfusions the day prior.)  ETOH cirrhosis s/p liver transplant  Tube feed related hyperglycemia  Stress related  hyperglycemia      Plan/Recommendations:    - Continue Lantus 28 units q 24 hrs at 1200.  - Increase NPH from 70 to 110 units at 2000 with tube feeding. Hold if tube feed on hold. (TF rate increasing tonight 50 mL/hr-->65 mL/hr).  -Add NPH 20 units once today at 0900 with methylprednisolone 500 mg.   - Novolog Meal Coverage: 1 units per 10 g CHO, TID AC and PRN with snacks/supplements   - Novolog Correction Scale: (ISF: 15) 2:30 >140 q4hr (minimal PO intake)  - BG monitoring: every 4 hours (minimal PO intake)  - Hypoglycemia protocol    - Carb counting protocol      Discussion: Global hyperglycemia, attributed to steroids. Remains on cyclic overnight TF. TF formula changed yesterday and rate increasing tonight. Methylprednisolone scheduled today at 0900, will add NPH to be given with Methylprendisolone. Paged primary team to find out steroid plan. Per team, Methylprednisolone 200 mg  tomorrow (3/19), followed by 160 mg , 120 mg , 80 mg , 40 mg, 20 mg. Will add NPH tomorrow morning with steroids pending BG trends today.       Discharge Planning: (tentative)    Medications: TBD    Test Claims: See PERRY Heard note 3/14-  Humulin N kwikpens covered at $0   Education:  Needs to be assessed closer to discharge.    Outpatient Follow-up:  recommend [facility],seen by Suzanne Parra in the past    Please notify Inpatient Diabetes Service if changes are planned to steroids, nutrition, TPN/TF and anticipated procedures requiring prolonged NPO status.           Interval History/Review of Systems :   - The last 24 hours progress and nursing notes reviewed.  - TF formula changed yesterday from Two Alli HN to Novasource renal. Per dietician, rate increasing tonight since patient is not meeting caloric goals.   - Cr elevated and increasing.   - Patient reports some nausea without emesis. Feels that nausea is improving. Also reports daily lose stools.   - Steroid dose stable today, tapering tomorrow.     Planned Procedures/Surgeries: none    Inpatient Glucose Control:       Recent Labs   Lab 03/18/24  0541 03/18/24  0421 03/18/24  0007 03/17/24  2001 03/17/24  1603 03/17/24  1227   * 233* 209* 238* 278* 369*             Medications Impacting Glycemia:   Steroids:   3/11 - 3/16: prednisone 5mg daily   3/16: Methylprednisolone 500 mg once at 1300  3/17: Methylprednisolone 500 mg once at 1000  3/18: Methylprednisolone 500 mg once at 0900  6 day steroid taper plan per primary team: Methylprednisolone 200 mg tomorrow, followed by 160 mg , 120 mg , 80 mg , 40 mg, 20 mg    D5W containing solutions/medications: none  Other medications impacting glucose:  anti-rejection medications         Nutrition:   Orders Placed This Encounter      3 Gram Potassium (low potassium) Diet  Supplements:  Glucerna at 1400   TF: Novasource Renal @ 50 mL/hr cycled 8pm-8am - 110 g CHO (changed from Two Alli HN 3/17) --> increasing 3/18  to Novasource Renal @ 65 mL/hr cycled 8pm-8am  TPN: none         Diabetes History: see full consult note for complete diabetes history   Diabetes Type and Duration:   T2DM diagnosed 10/31/2023 with A1c 8.1%.  GAD65 negative  "11/3/2023, IA-2 negative 11/3/2023, C -peptide normal (3.7) 10/31/2023.      Diabetes Medications:                   1) Tresiba 36 units once daily    2) Humalog 15 units with breakfast and lunch, 13 units with dinner plus correction scale 1 unit per 35 >175 mg/dL and 1 unit per 35 >210 at bedtime.   Historical Diabetes Medications: none, reports outpatient diabetes educator was looking into an insulin pump.     BG monitoring device & frequency:  Dexcom G7, also uses glucometer, sometimes has issues with android and Dexcom G7.   Outpatient Diabetes Provider:  [facility], Suzanne Todd ([facility]). Last visit 11/28/2023  Formal Diabetes Education/Educator: Liana Garcia. Last visit 2/23/2024.         Physical Exam:   /74 (BP Location: Left arm)   Pulse 95   Temp 97.9  F (36.6  C) (Oral)   Resp 16   Ht 1.73 m (5' 8.11\")   Wt 89.7 kg (197 lb 12.8 oz)   SpO2 97%   BMI 29.98 kg/m    General Appearance:  No acute distress, sitting up in chair. Family present in room.  HEENT: Normocephalic, atraumatic. Anicteric, conjunctiva clear.  Lungs:  Breathing comfortably on room air.   Abdomen: Round, nondistended. Soft, nontender. Incision CDI  Extremities: 1+ bilateral lower extremity edema.  Skin:  Warm and dry.   Neuro: Mild confusion, no focal deficits  Psych:  Calm, appropriate mood and affect.         Data:     Lab Results   Component Value Date    A1C 4.8 02/25/2024    A1C 7.7 12/19/2023    A1C 8.1 10/31/2023    A1C 5.1 08/23/2023    A1C 5.7 03/12/2022     BMP  Recent Labs   Lab 03/18/24  0541 03/18/24  0421 03/18/24  0007 03/17/24 2001 03/17/24  1603 03/17/24  1335 03/17/24  0503 03/17/24  0427 03/17/24  0408 03/17/24  0232 03/16/24  0813 03/16/24  0440 03/15/24  0843 03/15/24  0501   *  --   --   --   --   --   --  131*  --   --   --  137  --  135   POTASSIUM 4.8  --   --   --   --  4.9  --  5.7*  --  5.8*   < > 5.5*  --  4.8   CHLORIDE 96*  --   --   --   --   --   --  97*  --   --   --  99  --  " 99   MEG 9.1  --   --   --   --   --   --  9.3  --   --   --  9.5  --  9.2   CO2 25  --   --   --   --   --   --  25  --   --   --  29  --  26   BUN 55.2*  --   --   --   --   --   --  40.7*  --   --   --  25.9*  --  24.1*   CR 1.49*  --   --   --   --   --   --  1.24*  --   --   --  0.96  --  0.85   * 233* 209* 238*   < >  --    < > 295*   < > 349*   < > 222*   < > 263*    < > = values in this interval not displayed.     CBC  Recent Labs   Lab 03/18/24  0541 03/17/24  0427 03/16/24  0440 03/15/24  0501   WBC 14.6* 10.3 5.8 4.8   RBC 3.22* 3.13* 3.40* 3.34*   HGB 10.0* 10.0* 10.6* 10.3*   HCT 31.9* 30.4* 33.3* 32.9*   MCV 99 97 98 99   MCH 31.1 31.9 31.2 30.8   MCHC 31.3* 32.9 31.8 31.3*   RDW 19.9* 19.7* 20.1* 20.1*    234 195 144*       Inpatient Diabetes Service will continue to follow, please don't hesitate to contact the team with any questions or concerns.     Luba Dennis PA-C  Inpatient Diabetes Service  Pager: [phone]  Available on vocera      Plan discussed with patient, bedside RN, and primary team via this note.    To contact Inpatient Diabetes Service:     7 AM - 5 PM: Page the IDS JOSELYN following the patient that day (see filed or incomplete progress notes/consult notes under Endocrinology)    OR if uncertain of provider assignment: page job code [phone]    5 PM - 7 AM: First call after hours is to primary service.    For urgent after-hours questions, page job code for on call fellow: [phone]     I spent a total of  40 minutes on the date of the encounter doing prep/post-work, chart review, history and exam, documentation and further activities per the note including lab review, multidisciplinary communication, counseling the patient and/or coordinating care regarding acute hyper/hypoglycemic management, as well as discharge management and planning/communication.  See note for details.

## 2024-03-19 ENCOUNTER — HOSPITAL ENCOUNTER (INPATIENT)
Facility: CLINIC | Age: 54
End: 2024-03-19
Payer: COMMERCIAL

## 2024-03-19 ENCOUNTER — APPOINTMENT (OUTPATIENT)
Dept: PHYSICAL THERAPY | Facility: CLINIC | Age: 54
DRG: 005 | End: 2024-03-19
Attending: TRANSPLANT SURGERY
Payer: COMMERCIAL

## 2024-03-19 LAB
ALBUMIN SERPL BCG-MCNC: 2.9 G/DL (ref 3.5–5.2)
ALP SERPL-CCNC: 310 U/L (ref 40–150)
ALT SERPL W P-5'-P-CCNC: 87 U/L (ref 0–70)
ANION GAP SERPL CALCULATED.3IONS-SCNC: 13 MMOL/L (ref 7–15)
AST SERPL W P-5'-P-CCNC: 47 U/L (ref 0–45)
BILIRUB SERPL-MCNC: 1.1 MG/DL
BUN SERPL-MCNC: 61.8 MG/DL (ref 6–20)
CALCIUM SERPL-MCNC: 8.9 MG/DL (ref 8.6–10)
CHLORIDE SERPL-SCNC: 99 MMOL/L (ref 98–107)
CREAT SERPL-MCNC: 1.46 MG/DL (ref 0.67–1.17)
DEPRECATED HCO3 PLAS-SCNC: 24 MMOL/L (ref 22–29)
EGFRCR SERPLBLD CKD-EPI 2021: 58 ML/MIN/1.73M2
ERYTHROCYTE [DISTWIDTH] IN BLOOD BY AUTOMATED COUNT: 19.2 % (ref 10–15)
GLUCOSE BLDC GLUCOMTR-MCNC: 102 MG/DL (ref 70–99)
GLUCOSE BLDC GLUCOMTR-MCNC: 109 MG/DL (ref 70–99)
GLUCOSE BLDC GLUCOMTR-MCNC: 143 MG/DL (ref 70–99)
GLUCOSE BLDC GLUCOMTR-MCNC: 157 MG/DL (ref 70–99)
GLUCOSE BLDC GLUCOMTR-MCNC: 222 MG/DL (ref 70–99)
GLUCOSE BLDC GLUCOMTR-MCNC: 261 MG/DL (ref 70–99)
GLUCOSE BLDC GLUCOMTR-MCNC: 54 MG/DL (ref 70–99)
GLUCOSE BLDC GLUCOMTR-MCNC: 54 MG/DL (ref 70–99)
GLUCOSE BLDC GLUCOMTR-MCNC: 56 MG/DL (ref 70–99)
GLUCOSE BLDC GLUCOMTR-MCNC: 58 MG/DL (ref 70–99)
GLUCOSE SERPL-MCNC: 153 MG/DL (ref 70–99)
HCT VFR BLD AUTO: 29.9 % (ref 40–53)
HGB BLD-MCNC: 9.7 G/DL (ref 13.3–17.7)
HOLD SPECIMEN: NORMAL
MAGNESIUM SERPL-MCNC: 1.7 MG/DL (ref 1.7–2.3)
MCH RBC QN AUTO: 30.7 PG (ref 26.5–33)
MCHC RBC AUTO-ENTMCNC: 32.4 G/DL (ref 31.5–36.5)
MCV RBC AUTO: 95 FL (ref 78–100)
PHOSPHATE SERPL-MCNC: 4.5 MG/DL (ref 2.5–4.5)
PLATELET # BLD AUTO: 344 10E3/UL (ref 150–450)
POTASSIUM SERPL-SCNC: 4.4 MMOL/L (ref 3.4–5.3)
PROT SERPL-MCNC: 5.3 G/DL (ref 6.4–8.3)
RBC # BLD AUTO: 3.16 10E6/UL (ref 4.4–5.9)
SODIUM SERPL-SCNC: 136 MMOL/L (ref 135–145)
TACROLIMUS BLD-MCNC: 6.3 UG/L (ref 5–15)
TME LAST DOSE: NORMAL H
TME LAST DOSE: NORMAL H
WBC # BLD AUTO: 11.9 10E3/UL (ref 4–11)

## 2024-03-19 PROCEDURE — 258N000003 HC RX IP 258 OP 636: Performed by: NURSE PRACTITIONER

## 2024-03-19 PROCEDURE — 250N000013 HC RX MED GY IP 250 OP 250 PS 637: Performed by: NURSE PRACTITIONER

## 2024-03-19 PROCEDURE — 85027 COMPLETE CBC AUTOMATED: CPT | Performed by: NURSE PRACTITIONER

## 2024-03-19 PROCEDURE — 250N000011 HC RX IP 250 OP 636: Performed by: PHYSICIAN ASSISTANT

## 2024-03-19 PROCEDURE — 120N000011 HC R&B TRANSPLANT UMMC

## 2024-03-19 PROCEDURE — 250N000009 HC RX 250: Performed by: NURSE PRACTITIONER

## 2024-03-19 PROCEDURE — 250N000013 HC RX MED GY IP 250 OP 250 PS 637: Performed by: PHYSICIAN ASSISTANT

## 2024-03-19 PROCEDURE — 36415 COLL VENOUS BLD VENIPUNCTURE: CPT | Performed by: NURSE PRACTITIONER

## 2024-03-19 PROCEDURE — 97116 GAIT TRAINING THERAPY: CPT | Mod: GP

## 2024-03-19 PROCEDURE — 250N000012 HC RX MED GY IP 250 OP 636 PS 637: Performed by: TRANSPLANT SURGERY

## 2024-03-19 PROCEDURE — 250N000012 HC RX MED GY IP 250 OP 636 PS 637: Performed by: NURSE PRACTITIONER

## 2024-03-19 PROCEDURE — 99232 SBSQ HOSP IP/OBS MODERATE 35: CPT | Mod: 24 | Performed by: STUDENT IN AN ORGANIZED HEALTH CARE EDUCATION/TRAINING PROGRAM

## 2024-03-19 PROCEDURE — 80197 ASSAY OF TACROLIMUS: CPT | Performed by: NURSE PRACTITIONER

## 2024-03-19 PROCEDURE — 250N000011 HC RX IP 250 OP 636: Mod: JZ | Performed by: NURSE PRACTITIONER

## 2024-03-19 PROCEDURE — 250N000012 HC RX MED GY IP 250 OP 636 PS 637: Performed by: STUDENT IN AN ORGANIZED HEALTH CARE EDUCATION/TRAINING PROGRAM

## 2024-03-19 PROCEDURE — 258N000003 HC RX IP 258 OP 636: Performed by: PHYSICIAN ASSISTANT

## 2024-03-19 PROCEDURE — 250N000012 HC RX MED GY IP 250 OP 636 PS 637: Performed by: PHYSICIAN ASSISTANT

## 2024-03-19 PROCEDURE — 250N000013 HC RX MED GY IP 250 OP 250 PS 637: Performed by: TRANSPLANT SURGERY

## 2024-03-19 PROCEDURE — 84100 ASSAY OF PHOSPHORUS: CPT | Performed by: NURSE PRACTITIONER

## 2024-03-19 PROCEDURE — 258N000001 HC RX 258: Performed by: NURSE PRACTITIONER

## 2024-03-19 PROCEDURE — 97530 THERAPEUTIC ACTIVITIES: CPT | Mod: GP

## 2024-03-19 PROCEDURE — 80053 COMPREHEN METABOLIC PANEL: CPT | Performed by: NURSE PRACTITIONER

## 2024-03-19 PROCEDURE — 83735 ASSAY OF MAGNESIUM: CPT | Performed by: NURSE PRACTITIONER

## 2024-03-19 RX ORDER — ACETAMINOPHEN 325 MG/10.15ML
650 LIQUID ORAL EVERY 6 HOURS PRN
Status: DISCONTINUED | OUTPATIENT
Start: 2024-03-19 | End: 2024-03-21 | Stop reason: HOSPADM

## 2024-03-19 RX ADMIN — MYCOPHENOLATE MOFETIL 750 MG: 200 POWDER, FOR SUSPENSION ORAL at 08:43

## 2024-03-19 RX ADMIN — INSULIN ASPART 7 UNITS: 100 INJECTION, SOLUTION INTRAVENOUS; SUBCUTANEOUS at 20:27

## 2024-03-19 RX ADMIN — TACROLIMUS 8 MG: 5 CAPSULE ORAL at 17:51

## 2024-03-19 RX ADMIN — ASPIRIN 325 MG ORAL TABLET 325 MG: 325 PILL ORAL at 08:33

## 2024-03-19 RX ADMIN — ACETAMINOPHEN 650 MG: 325 SOLUTION ORAL at 00:46

## 2024-03-19 RX ADMIN — INSULIN ASPART 1 UNITS: 100 INJECTION, SOLUTION INTRAVENOUS; SUBCUTANEOUS at 17:59

## 2024-03-19 RX ADMIN — DEXTROSE MONOHYDRATE 25 ML: 25 INJECTION, SOLUTION INTRAVENOUS at 12:20

## 2024-03-19 RX ADMIN — URSODIOL 300 MG: 300 CAPSULE ORAL at 20:27

## 2024-03-19 RX ADMIN — PANTOPRAZOLE SODIUM 40 MG: 40 TABLET, DELAYED RELEASE ORAL at 08:33

## 2024-03-19 RX ADMIN — TACROLIMUS 8 MG: 5 CAPSULE ORAL at 08:43

## 2024-03-19 RX ADMIN — MYCOPHENOLATE MOFETIL 750 MG: 250 CAPSULE ORAL at 17:51

## 2024-03-19 RX ADMIN — THIAMINE HCL TAB 100 MG 100 MG: 100 TAB at 08:33

## 2024-03-19 RX ADMIN — VALGANCICLOVIR HYDROCHLORIDE 450 MG: 450 TABLET ORAL at 20:27

## 2024-03-19 RX ADMIN — SODIUM CHLORIDE 200 MG: 9 INJECTION, SOLUTION INTRAVENOUS at 08:59

## 2024-03-19 RX ADMIN — MAGNESIUM OXIDE TAB 400 MG (241.3 MG ELEMENTAL MG) 800 MG: 400 (241.3 MG) TAB at 22:14

## 2024-03-19 RX ADMIN — MAGNESIUM OXIDE TAB 400 MG (241.3 MG ELEMENTAL MG) 800 MG: 400 (241.3 MG) TAB at 11:09

## 2024-03-19 RX ADMIN — QUETIAPINE FUMARATE 25 MG: 25 TABLET ORAL at 22:14

## 2024-03-19 RX ADMIN — MICAFUNGIN SODIUM 100 MG: 50 INJECTION, POWDER, LYOPHILIZED, FOR SOLUTION INTRAVENOUS at 11:01

## 2024-03-19 RX ADMIN — Medication 1 TABLET: at 08:33

## 2024-03-19 RX ADMIN — ACETAMINOPHEN 650 MG: 325 SOLUTION ORAL at 08:43

## 2024-03-19 RX ADMIN — ACETAMINOPHEN 650 MG: 325 SOLUTION ORAL at 20:28

## 2024-03-19 RX ADMIN — URSODIOL 300 MG: 300 CAPSULE ORAL at 08:43

## 2024-03-19 RX ADMIN — Medication 1 PACKET: at 08:44

## 2024-03-19 RX ADMIN — INSULIN ASPART 3 UNITS: 100 INJECTION, SOLUTION INTRAVENOUS; SUBCUTANEOUS at 10:01

## 2024-03-19 ASSESSMENT — ACTIVITIES OF DAILY LIVING (ADL)
ADLS_ACUITY_SCORE: 30

## 2024-03-19 NOTE — PROGRESS NOTES
Calorie Count  Intake recorded for: 3/18  Total Kcals: 443 Total Protein: 15g  Kcals from Hospital Food: 443  Protein: 15g  Kcals from Outside Food (average):0 Protein: 0g  # Meals Ordered from Kitchen: 1 meal   # Meals Recorded: 1 meal - 100% peaches, 50% grapes juice, 25% breakfast taco w/ scrambled eggs, sausage & cheese  # Supplements Recorded: 75% 1 Magic Cup

## 2024-03-19 NOTE — DISCHARGE SUMMARY
[facility]    Discharge Summary  Solid Organ Transplant    Date of Admission:  3/4/2024  Date of Discharge:  3/21/2024  Discharging Provider: Lesley Wise PA-C/Joanna Goldman MD     Discharge Diagnoses   Principal Problem:    Liver replaced by transplant (H)  Active Problems:    Severe malnutrition (H24)    Type 2 diabetes mellitus without complication, with long-term current use of insulin (H)    ANGEL (acute kidney injury) (H24)    Delirium    Immunosuppressed status (H24)    Liver transplant rejection (H)    Acute post-operative pain    Atrial fibrillation with RVR (H)    Pleural effusion    Anemia due to blood loss, acute    Hypomagnesemia    Hypervolemia      Discharge Disposition   Discharged to home  Condition at discharge: Stable    Hospital Course   Mary Lopez is a 53 year old male with a history of EtOH cirrhosis (MELD 31), complicated by encephalopathy, hyponatremia, chronic thrombocytopenia, macrocytic anemia, carrier of hemochromatosis HFE gene mutation,  with hospitalization (2024) for encephalopathy and suspected SBP, and recent admission -3/2 for Strep bovis bacteremia. He is now s/p  donor liver transplant on 3/5/24 with biliary stent placement with Dr. Cortez. The following problems were addressed during his hospitalization:    Liver transplant: AST/ALT and Tbili improving with rejection treatment. ALK PHOS remains elevated in 300's, will monitor closely on discharge.   -3/11 HIDA negative for leak.   -Continue ursodiol BID.   -ASA 325mg daily for HA prophylaxis.   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 Treated with Methylprednisolone 500mg x3 (3/16-3/18) followed by taper.  -Explant pathology:   A. GALLBLADDER, DONOR CHOLECYSTECTOMY:   Ischemic/preservation changes otherwise no significant morphologic abnormality      B. LIVER (1578 gm)/ GALLBLADDER; EXPLANTED AT  TRANSPLANTATION:  Advanced cirrhosis, inactive (Laennec fibrosis stage 4C):   -Compatible with alcohol etiology, now quiescent from abstinence   -Negative for hepatocellular or other malignancy   -Severe hemosiderosis (4+ parenchymal iron on a scale of 0-4   -Bile ducts are present in normal numbers and architecture   -Absent gallbladder from remote cholecystomy     Immunosuppressed due to medications:    -Induction steroid taper per protocol.  -Tacrolimus 6 mg BID with ketoconazole boost, goal level 8-10.  -Mycophenolate 750mg BID.  -Opportunistic infectious prophylaxis with Bactrim x6 months and Valcyte x3.    Transplant coordinator Lore Maliksylvia [phone].  Biliary stent:  yes  Donor status:  DBD  CMV D - / R -  EBV D + / R +  Anticoagulation plan: 325mg x 6months     Neurology:   Acute postoperative pain: Discharge on the following.  -APAP prn  -Atarax prn   Delirium: Post-op, in the setting of sleep deprivation. Improved with Seroquel 25 mg at bedtime.      Hematology:   Acute blood loss anemia: Hgb 10.5 on discharge.     Cardiorespiratory:   Bilateral pleural effusions: 3/7 CXR L pleural effusion stable, R pleural effusion slightly improved.  Suspected JAYLIN: Sleep study previously recommended to pt. STOP-BANG score 6/8 in 10/2023. Required O2 overnight only post-op. Recommend sleep study.  Atrial fib RVR: Onset 3/11 with rate 180s in setting of hypoK and hypoMg. Converted to NSR with metoprolol.   Wide complex tachycardia: VT vs AF with aberrant conduction. 17 beats on 3/12. Asymptomatic.      GI/Nutrition:   Severe malnutrition in the context of acute illness: Feeding tube placed due to poor oral intake. Oral intake improved and feeding tube was removed on 3/21. At discharge, continue regular diet.      Endocrine:   Insulin-dependent type 2 diabetes mellitus w/ steroid hyperglycemia: Endocrinology consulted to assist wtith management. Initially managed on an insulin gtt. Per endo will discharge on:   Supplies  "needed at discharge: Dexcom G7 sensors, Glucagon, Semglee (YFGN) pens, Humalog pens, \"BD\" (32G x 4mm) insulin pen needles.   BG Monitoring: three times daily before meals and at bedtime. Ok to use Dexcom G7. Glucometer as back-up. BG goal: 80 - 140 mg/dL.  Medications:   1) Semglee 23 units once daily at noon (No Tresiba while on daily steroids to be able to titrate daily if needed, Tresiba is ultra-long acting). If fasting BG <80, reduce Semglee by 5 units. If fasting BG >140, increase Semglee by 2 units.   3) Humalog 4 units with meals plus correction scale 1:50 >140 TID AC and 1:50 >200 HS  Pre-meal Humalog correction scale:   Do Not give Correction Insulin if Pre-Meal BG less than 140.    For Pre-Meal  - 189 give 1 unit.    For Pre-Meal  - 239 give 2 units.    For Pre-Meal  - 289 give 3 units.    For Pre-Meal  - 339 give 4 units.    For Pre-Meal - 399 give 5 units.    For Pre-Meal -449 give 6 units   For Pre-Meal BG greater than or equal to 450 give 7 units.    To be given with prandial insulin, and based on pre-meal blood glucose.       Bedtime Humalog correction scale:    Do Not give Bedtime Correction Insulin if BG less than  200.    For  - 249 give 1 units.    For  - 299 give 2 units.    For  - 349 give 3 units.    For  -399 give 4 units.    For BG greater than or equal to 400 give 5 units.   Notify provider if glucose greater than or equal to 350 mg/dL after administration of correction dose.        Fluid/Electrolytes/Renal:   Hypomagnesemia: Secondary to tacrolimus. Continue Mag ox 800mg BID.  ANGEL, oliguric. Nephrology consulted, temporary dialysis line placed and initiated CRRT 3/7-3/9. Cr normalized, UOP improved with diuresis. SCr 1.2 at discharge.  Hypervolemia: Initially managed with CRRT and then transitioned to oral diuresis, now discontinued. Resolved.     Consultations This Hospital Stay   SOCIAL WORK IP CONSULT  NUTRITION SERVICES ADULT " IP CONSULT  PHYSICAL THERAPY ADULT IP CONSULT  OCCUPATIONAL THERAPY ADULT IP CONSULT  PHARMACY TO DOSE VANCO  NURSING TO CONSULT FOR VASCULAR ACCESS CARE IP CONSULT  CARE MANAGEMENT / SOCIAL WORK IP CONSULT  NURSING TO CONSULT FOR VASCULAR ACCESS CARE IP CONSULT  PHARMACY IP CONSULT  NUTRITION SERVICES ADULT IP CONSULT  OCCUPATIONAL THERAPY ADULT IP CONSULT  PHYSICAL THERAPY ADULT IP CONSULT  PHARMACY IP CONSULT  SOT MEDICATION HISTORY IP PHARMACY CONSULT  PHARMACY TO DOSE VANCO  NUTRITION SERVICES ADULT IP CONSULT  PHARMACY IP CONSULT  NEPHROLOGY KIDNEY/PANCREAS TRANSPLANT ADULT IP CONSULT  PHARMACY CRRT IP CONSULT  LYMPHEDEMA THERAPY IP CONSULT  ENDOCRINE DIABETES ADULT IP CONSULT  PHARMACY LIAISON FOR MEDICATION COVERAGE CONSULT  INTERVENTIONAL RADIOLOGY ADULT/PEDS IP CONSULT  NURSING TO CONSULT FOR VASCULAR ACCESS CARE IP CONSULT  NURSING TO CONSULT FOR VASCULAR ACCESS CARE IP CONSULT  NURSING TO CONSULT FOR VASCULAR ACCESS CARE IP CONSULT  NUTRITION SERVICES ADULT IP CONSULT  OCCUPATIONAL THERAPY ADULT IP CONSULT  PHYSICAL THERAPY ADULT IP CONSULT  PHARMACY IP CONSULT  SOT MEDICATION HISTORY IP PHARMACY CONSULT    Code Status   Full Code    Time Spent on this Encounter   ILesley PA-C, personally saw the patient today and spent greater than 30 minutes discharging this patient.     Lesley Wise PA-C  [facility]  ______________________________________________________________________    Physical Exam   Temp: 98.2  F (36.8  C) Temp src: Oral BP: 127/78 Pulse: 87   Resp: 18 SpO2: 100 % O2 Device: None (Room air)    Vitals:    03/19/24 1638 03/20/24 0357 03/21/24 0817   Weight: 90.5 kg (199 lb 9.6 oz) 90.5 kg (199 lb 8 oz) 90.3 kg (199 lb)     Vital Signs with Ranges  Temp:  [98.1  F (36.7  C)-98.5  F (36.9  C)] 98.2  F (36.8  C)  Pulse:  [87-96] 87  Resp:  [18] 18  BP: (118-127)/(52-78) 127/78  SpO2:  [97 %-100 %] 100 %  I/O last 3 completed  shifts:  In: 877 [P.O.:230; I.V.:62]  Out: 1300 [Urine:1300]    General Appearance: NAD  Skin: normal, warm  Heart: NSR  Lungs: Unlabored, RA  Abdomen: Soft, appropriately tender, ND,  Incision CDI  : voiding with good urine output  Extremities: edema: bilateral lower extremity 1+   Neurologic: A&Ox2.    Primary Care Physician   Jimmie Hoyt    Discharge Orders      Home Care Referral      Follow Up ([facility])    [facility] hospital follow up in 1-2 weeks for T2DM on quick high dose steroid taper and cyclic tube feeding. Has seen Suzanne Todd before and also sees Liana Garcia (educator).     Reason for your hospital stay    Liver transplant.     Activity    Your activity upon discharge: Walk at least four times a day, lift no greater than 10 pounds for 8 weeks from the time of surgery.  No driving while taking narcotics or 3 weeks after surgery.     Monitor and record    Monitor weight daily.      Monitor glucose 4 times per day.     When to contact your care team    WHEN TO CONTACT YOUR  COORDINATOR:     Transplant Coordinator [phone]     Notify your coordinator if you have pain over your liver, increased redness or drainage from your incision, fever greater than 100F, or yellowing of skin or eyes.     Notify your coordinator immediately if you are ever unable to take your immunosuppressive medications for any reason.     If you have URGENT concerns after office hours, please call the hospital switchboard at [phone] and ask to have the organ transplant nurse on-call paged. If you have a life-threatening emergency, go to the nearest emergency room.     Wound care and dressings    Instructions to care for your wound at home: If you have staples in place, they will be removed in 3 weeks after operation. Wash incision daily with soap and water. Do not soak or scrub.     Follow Up and recommended labs and tests    [facility] FOLLOW UP:     1. Follow up in Transplant Clinic weekly  for the next 5 weeks with Dr. Cortez (or any available liver transplant surgeon).     2. Follow up with Transplant Hepatology in 3 months.     3. Abdominal x-ray 3 months post-transplant to evaluate for biliary stent.    4. Follow up with primary care provider or endocrinology in 1 week.     5. Follow up with outpatient sleep study     Call your Transplant Coordinator ([phone]) with questions about Transplant Center appointment scheduling.     LABS:     CBC, BMP, magnesium, phosphorus, hepatic panel, tacrolimus level every Monday and Thursday by home health care nurse if arranged, or at an outpatient lab.     Diet    Follow this diet upon discharge: Diet recommendations post-transplant: Heart healthy dietary habits long term (low saturated/trans fat, low sodium). High protein diet x 8 weeks. Practice food safety precautions.       Significant Results and Procedures   Procedure date:  03/05/24    Preoperative diagnosis:  End Stage Liver Disease due to Laënnec cirrhosis    Postoperative diagnosis:  Same, intra-abdominal adhesions   Procedure  #1.  Liver transplant  #2.  Adhesional lysis lasting for greater than 60 minutes      Surgeon(s) and Role:     * Ryder Cortez MD - Primary     * Alex Mcmanus MD - Assisting     * Estiven Martel MD - Fellow - Assisting     Dr. Estiven Martel assisted for the entire procedure.  There was no qualified resident to assist with this procedure      General    Specimen:  Explant liver, ascites for culture.    Drains:  1 TIMOTHY drain    Urine output:  Few mL mls    Estimated blood loss:  8 units PRBC transfused    Fluids administered:        Final Diagnosis   A. GALLBLADDER, DONOR CHOLECYSTECTOMY:   Ischemic/preservation changes otherwise no significant morphologic abnormality      B. LIVER (1578 gm)/ GALLBLADDER; EXPLANTED AT TRANSPLANTATION:  Advanced cirrhosis, inactive (Laennec fibrosis stage 4C):   -Compatible with alcohol etiology, now quiescent from abstinence    -Negative for hepatocellular or other malignancy   -Severe hemosiderosis (4+ parenchymal iron on a scale of 0-4   -Bile ducts are present in normal numbers and architecture   -Absent gallbladder from remote cholecystomy             (See Comment)      Discharge Medications   Current Discharge Medication List        START taking these medications    Details   acetaminophen (TYLENOL) 500 MG tablet Take 1-2 tablets (500-1,000 mg) by mouth every 6 hours as needed for mild pain  Qty: 30 tablet, Refills: 0    Associated Diagnoses: Liver replaced by transplant (H)      aspirin (ASA) 325 MG tablet 1 tablet (325 mg) by Oral or Feeding Tube route daily  Qty: 30 tablet, Refills: 5    Associated Diagnoses: Liver replaced by transplant (H)      !! Continuous Blood Gluc Sensor (DEXCOM G7 SENSOR) MISC Change every 10 days.  Qty: 3 each, Refills: 5    Associated Diagnoses: Liver replaced by transplant (H)      Glucagon (BAQSIMI) 3 MG/DOSE nasal powder Spray 1 spray (3 mg) in nostril as needed for low blood sugar in the event of unconscious hypoglycemia or hypoglycemic seizure. May repeat dose if no response after 15 minutes.  Qty: 2 each, Refills: 1    Associated Diagnoses: Liver replaced by transplant (H)      hydrOXYzine HCl (ATARAX) 25 MG tablet 1 tablet (25 mg) by Oral or Feeding Tube route every 6 hours as needed for other (adjuvant pain)  Qty: 20 tablet, Refills: 0    Associated Diagnoses: Liver replaced by transplant (H)      insulin aspart (NOVOLOG PEN) 100 UNIT/ML pen Inject 1-10 Units Subcutaneous 3 times daily (before meals) Humalog 4 units with meals plus correction scale 1:50 >140 TID AC and 1:50 >200 HS Pre-meal Humalog correction scale: Do Not give Correction Insulin if Pre-Meal BG less than 140. For Pre-Meal  - 189 give 1 unit. For Pre-Meal  - 239 give 2 units. For Pre-Meal  - 289 give 3 units. For Pre-Meal  - 339 give 4 units. For Pre-Meal - 399 give 5 units. For Pre-Meal -449  give 6 units For Pre-Meal BG greater than or equal to 450 give 7 units. To be given with prandial insulin, and based on pre-meal blood glucose. Bedtime Humalog correction scale: Do Not give Bedtime Correction Insulin if BG less than 200. For  - 249 give 1 units. For  - 299 give 2 units. For  - 349 give 3 units. For  -399 give 4 units. For BG greater than or equal to 400 give 5 units. Notify provider if glucose greater than or equal to 350 mg/dL after administration of correction dose.  Qty: 15 mL, Refills: 1    Associated Diagnoses: Liver replaced by transplant (H)      Insulin Glargine-yfgn (SEMGLEE, YFGN,) 100 UNIT/ML SOLN Inject 23 Units Subcutaneous daily  Qty: 10 mL, Refills: 2    Associated Diagnoses: Liver replaced by transplant (H)      !! insulin pen needle (32G X 4 MM) 32G X 4 MM miscellaneous Use as directed by provider Per insurance coverage  Qty: 200 each, Refills: 1    Associated Diagnoses: Liver replaced by transplant (H)      ketoconazole (NIZORAL) 200 MG tablet Take 0.5 tablets (100 mg) by mouth daily  Qty: 30 tablet, Refills: 2    Associated Diagnoses: Liver replaced by transplant (H)      magnesium oxide (MAG-OX) 400 MG tablet Take 2 tablets (800 mg) by mouth 2 times daily for 180 days  Qty: 120 tablet, Refills: 5    Associated Diagnoses: Liver replaced by transplant (H)      mycophenolate (GENERIC EQUIVALENT) 250 MG capsule Take 3 capsules (750 mg) by mouth 2 times daily  Qty: 180 capsule, Refills: 11    Associated Diagnoses: Liver transplant candidate      nicotine (NICODERM CQ) 7 MG/24HR 24 hr patch Place 1 patch onto the skin daily as needed for nicotine withdrawal symptoms  Qty: 7 patch, Refills: 3    Associated Diagnoses: Liver replaced by transplant (H)      polyethylene glycol (MIRALAX) 17 GM/Dose powder Take 17 g by mouth daily  Qty: 510 g, Refills: 1    Associated Diagnoses: Liver replaced by transplant (H)      predniSONE (DELTASONE) 20 MG tablet 3/22: 80 mg  3/23: 40 mg 3/24: 20 mg  Qty: 7 tablet, Refills: 0    Comments: 3/22: 80 mg 3/23: 40 mg 3/24: 20 mg  Associated Diagnoses: Liver replaced by transplant (H)      QUEtiapine (SEROQUEL) 25 MG tablet Take 1 tablet (25 mg) by mouth at bedtime  Qty: 30 tablet, Refills: 0    Associated Diagnoses: Liver replaced by transplant (H)      senna-docusate (SENOKOT-S/PERICOLACE) 8.6-50 MG tablet 1-2 tablets by Oral or Feeding Tube route 2 times daily as needed for constipation  Qty: 60 tablet, Refills: 1    Associated Diagnoses: Liver replaced by transplant (H)      sulfamethoxazole-trimethoprim (BACTRIM) 400-80 MG tablet Take 1 tablet by mouth every evening  Qty: 30 tablet, Refills: 5    Associated Diagnoses: Liver replaced by transplant (H)      tacrolimus (GENERIC EQUIVALENT) 1 MG capsule Take 6 capsules (6 mg) by mouth 2 times daily  Qty: 360 capsule, Refills: 11    Associated Diagnoses: Liver replaced by transplant (H)      ursodiol (ACTIGALL) 300 MG capsule Take 1 capsule (300 mg) by mouth 2 times daily  Qty: 60 capsule, Refills: 5    Associated Diagnoses: Liver transplant candidate      valGANciclovir (VALCYTE) 450 MG tablet Take 1 tablet (450 mg) by mouth every evening  Qty: 30 tablet, Refills: 2    Associated Diagnoses: Liver transplant candidate       !! - Potential duplicate medications found. Please discuss with provider.        CONTINUE these medications which have NOT CHANGED    Details   melatonin 10 MG TABS tablet Take 1 tablet (10 mg) by mouth nightly as needed for sleep    Associated Diagnoses: Persistent insomnia      multivitamin w/minerals (THERA-VIT-M) tablet TAKE 1 TABLET BY MOUTH DAILY  Qty: 90 tablet, Refills: 1    Associated Diagnoses: Alcoholic cirrhosis of liver without ascites (H); Alcoholic hepatitis without ascites (H28)      omeprazole (PRILOSEC) 20 MG DR capsule Take 1 capsule (20 mg) by mouth 2 times daily  Qty: 180 capsule, Refills: 1    Associated Diagnoses: Secondary esophageal varices without  bleeding (H)      blood glucose (NO BRAND SPECIFIED) test strip Use to test blood sugar two times daily or as directed. To accompany: Blood Glucose Monitor Brands: per insurance.  Qty: 100 strip, Refills: 6    Associated Diagnoses: Type 2 diabetes mellitus without complication, without long-term current use of insulin (H)      blood glucose monitoring (NO BRAND SPECIFIED) meter device kit Use to test blood sugar twice times daily or as directed. Preferred blood glucose meter OR supplies to accompany: Blood Glucose Monitor Brands: per insurance.  Qty: 1 kit, Refills: 0    Associated Diagnoses: Type 2 diabetes mellitus without complication, without long-term current use of insulin (H)      !! Continuous Blood Gluc Sensor (DEXCOM G7 SENSOR) MISC Change every 10 days.  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 insulin pen needle (BD PEN NEEDLE SHERRIE 2ND GEN) 32G X 4 MM miscellaneous USES 5 PER DAY  Qty: 100 each, Refills: 11    Associated Diagnoses: Type 2 diabetes mellitus with hyperosmolarity without coma, with long-term current use of insulin (H)      thin (NO BRAND SPECIFIED) lancets Use with lanceting device. To accompany: Blood Glucose Monitor Brands: per insurance.  Qty: 100 each, Refills: 6    Associated Diagnoses: Type 2 diabetes mellitus without complication, without long-term current use of insulin (H)       !! - Potential duplicate medications found. Please discuss with provider.        STOP taking these medications       amoxicillin (AMOXIL) 500 MG capsule Comments:   Reason for Stopping:         folic acid (FOLVITE) 1 MG tablet Comments:   Reason for Stopping:         HYDROcodone-acetaminophen (NORCO) 5-325 MG tablet Comments:   Reason for Stopping:         insulin degludec (TRESIBA FLEXTOUCH) 100 UNIT/ML pen Comments:   Reason for Stopping:         insulin lispro (HUMALOG KWIKPEN) 100 UNIT/ML (1 unit dial) KWIKPEN Comments:    Reason for Stopping:         Insulin Lispro (HUMALOG KWIKPEN) 200 UNIT/ML soln Comments:   Reason for Stopping:         lactulose (CHRONULAC) 10 GM/15ML solution Comments:   Reason for Stopping:         levofloxacin (LEVAQUIN) 500 MG tablet Comments:   Reason for Stopping:         midodrine (PROAMATINE) 10 MG tablet Comments:   Reason for Stopping:         potassium chloride ER (KLOR-CON M) 10 MEQ CR tablet Comments:   Reason for Stopping:         rifaximin (XIFAXAN) 550 MG TABS tablet Comments:   Reason for Stopping:         spironolactone (ALDACTONE) 25 MG tablet Comments:   Reason for Stopping:         thiamine (B-1) 100 MG tablet Comments:   Reason for Stopping:         torsemide (DEMADEX) 10 MG tablet Comments:   Reason for Stopping:         traZODone (DESYREL) 50 MG tablet Comments:   Reason for Stopping:             Allergies   Allergies   Allergen Reactions    Other Food Allergy Anaphylaxis     Legumes (black beans, baked beans, chickpeas)    Pineapple Itching    I have reviewed history, examined patient and discussed plan with the fellow/resident/JOSELYN.    I concur with the findings in this note.    Time spent on discharge activities: 45 minutes.

## 2024-03-19 NOTE — PROGRESS NOTES
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 Insulin drip since operation with high needs on steroid taper and continuous tube feeds. Inpatient diabetes management service consulted on 3/13/24 for assistance in management.           Assessment/Plan:     Assessment:   Insulin dependent  Diabetes Mellitus, without known complications, sub optimal control based on hyperglycemia  (A1c 4.8% (2/25/2024)  - A1c inaccurte in setting of anemia and RBC transfusions the day prior.)  ETOH cirrhosis s/p liver transplant  Tube feed related hyperglycemia  Stress related  hyperglycemia      Plan/Recommendations:    - Continue Lantus 28 units q 24 hrs at 1200.  - Decrease NPH from 110 to 45 units at 2000 with tube feeding (with less steroids today). Hold if tube feed on hold.   -Add NPH 10 units once today at 0900 with methylprednisolone 200 mg.   - Novolog Meal Coverage: 1 units per 10 g CHO, TID AC and PRN with snacks/supplements   - Novolog Correction Scale: change to 1:25 >140 TID AC; and 1:25 >140 2200 & 0200 (higher overnight while on tube feeding)  - BG monitoring: TID AC, HS, 0200  - Hypoglycemia protocol    - Carb counting protocol      Discussion: No fasting AM BG before breakfast.  at 5AM. Otherwise global hyperglycemia while on high dose steroids. Steroids tapering today. Remains on cyclic overnight TF, to note appears TF started 2 hrs late and NPH given 2 hrs late last night - TF only ran 10 hrs last night instead of scheduled 12 hrs. Will add NPH dose this morning with methylprednisolone. Hypoglycemia at noon today, attributed to too much NPH and TF only running 10 hrs last night. Relax NPH this evening with TF now that steroid is tapering. Starting to eat more, change  BG monitoring and correction scale to TID AC, HS, 0200.    3/18: Per team, Methylprednisolone 200 mg  (3/19), followed by 162.5 mg , 120 mg , 80 mg , 40 mg, 20 mg. Will add NPH tomorrow morning with steroids pending BG trends today.       Discharge Planning: (tentative)    Medications: TBD    Test Claims: See PERRY Heard note 3/14- Humulin N kwikpens covered at $0   Education:  Needs to be assessed closer to discharge.    Outpatient Follow-up:  recommend [facility],seen by Suzanne Parra in the past    Please notify Inpatient Diabetes Service if changes are planned to steroids, nutrition, TPN/TF and anticipated procedures requiring prolonged NPO status.           Interval History/Review of Systems :   - The last 24 hours progress and nursing notes reviewed.  - Patient eating breakfast this morning, he is unsure what he ate. Denies nausea or emesis. Wife trying to help patient eat more   - Per RN, no fasting BG reading taken before patient started eating breakfast today.   - Remains on cyclic TF, rate was increased yesterday. Stable today. Started 2 hrs late night night, still turned off at 0800.   - High dose steroids quickly tapering daily over the next 5 days.   - Hypoglycemia at noon today, corrected with IV dextrose      Planned Procedures/Surgeries: none    Inpatient Glucose Control:       Recent Labs   Lab 03/19/24  0508 03/19/24  0438 03/19/24  0020 03/18/24  2033 03/18/24  1528 03/18/24  1154   * 153* 222* 225* 203* 213*             Medications Impacting Glycemia:   Steroids:   3/11 - 3/16: prednisone 5mg daily   3/16: Methylprednisolone 500 mg once at 1300  3/17: Methylprednisolone 500 mg once at 1000  3/18: Methylprednisolone 500 mg once at 0900  3/19: Methylprednisolone 200 mg once at 0800   steroid taper plan per primary team: Methylprednisolone 162.5 mg tomorrow, followed by  120 mg , 80 mg , 40 mg, 20 mg    D5W containing solutions/medications: none  Other medications impacting glucose:   "anti-rejection medications         Nutrition:   Orders Placed This Encounter      3 Gram Potassium (low potassium) Diet  Supplements:  Glucerna at 1400   TF: Novasource Renal @ 50 mL/hr cycled 8pm-8am - 110 g CHO (changed from Two Alli HN 3/17) --> increased 3/18  to Novasource Renal @ 65 mL/hr cycled 8pm-8am - 143g CHO   TPN: none         Diabetes History: see full consult note for complete diabetes history   Diabetes Type and Duration:   T2DM diagnosed 10/31/2023 with A1c 8.1%.  GAD65 negative 11/3/2023, IA-2 negative 11/3/2023, C -peptide normal (3.7) 10/31/2023.      Diabetes Medications:                   1) Tresiba 36 units once daily    2) Humalog 15 units with breakfast and lunch, 13 units with dinner plus correction scale 1 unit per 35 >175 mg/dL and 1 unit per 35 >210 at bedtime.   Historical Diabetes Medications: none, reports outpatient diabetes educator was looking into an insulin pump.     BG monitoring device & frequency:  Dexcom G7, also uses glucometer, sometimes has issues with android and Dexcom G7.   Outpatient Diabetes Provider:  [facility], Suzanne Todd (ALLY). Last visit 11/28/2023  Formal Diabetes Education/Educator: Liana Garcia. Last visit 2/23/2024.         Physical Exam:   /67 (BP Location: Left arm)   Pulse 93   Temp 97.8  F (36.6  C) (Oral)   Resp 14   Ht 1.73 m (5' 8.11\")   Wt 89.7 kg (197 lb 12.8 oz)   SpO2 97%   BMI 29.98 kg/m    General Appearance:  No acute distress, resting in bed. Tube feed off. Wife at bedside.   HEENT: Normocephalic, atraumatic. Anicteric, conjunctiva clear.  Lungs:  Breathing comfortably on room air.   Skin:  Warm and dry.   Neuro: Mild confusion, no focal deficits.  Psych:  Calm, appropriate mood and affect.         Data:     Lab Results   Component Value Date    A1C 4.8 02/25/2024    A1C 7.7 12/19/2023    A1C 8.1 10/31/2023    A1C 5.1 08/23/2023    A1C 5.7 03/12/2022     BMP  Recent Labs   Lab 03/19/24  0508 03/19/24  0438 " 03/19/24  0020 03/18/24  2033 03/18/24  0904 03/18/24  0541 03/17/24  1603 03/17/24  1335 03/17/24  0503 03/17/24  0427 03/16/24  0813 03/16/24  0440   NA  --  136  --   --   --  133*  --   --   --  131*  --  137   POTASSIUM  --  4.4  --   --   --  4.8  --  4.9  --  5.7*   < > 5.5*   CHLORIDE  --  99  --   --   --  96*  --   --   --  97*  --  99   MEG  --  8.9  --   --   --  9.1  --   --   --  9.3  --  9.5   CO2  --  24  --   --   --  25  --   --   --  25  --  29   BUN  --  61.8*  --   --   --  55.2*  --   --   --  40.7*  --  25.9*   CR  --  1.46*  --   --   --  1.49*  --   --   --  1.24*  --  0.96   * 153* 222* 225*   < > 237*   < >  --    < > 295*   < > 222*    < > = values in this interval not displayed.     CBC  Recent Labs   Lab 03/19/24  0438 03/18/24  0541 03/17/24  0427 03/16/24  0440   WBC 11.9* 14.6* 10.3 5.8   RBC 3.16* 3.22* 3.13* 3.40*   HGB 9.7* 10.0* 10.0* 10.6*   HCT 29.9* 31.9* 30.4* 33.3*   MCV 95 99 97 98   MCH 30.7 31.1 31.9 31.2   MCHC 32.4 31.3* 32.9 31.8   RDW 19.2* 19.9* 19.7* 20.1*    [phone]     Inpatient Diabetes Service will continue to follow, please don't hesitate to contact the team with any questions or concerns.     Luba Dennis PA-C  Inpatient Diabetes Service  Pager: [phone]  Available on vocera    Plan discussed with patient, bedside RN, and primary team via this note.    To contact Inpatient Diabetes Service:     7 AM - 5 PM: Page the IDS JOSELYN following the patient that day (see filed or incomplete progress notes/consult notes under Endocrinology)    OR if uncertain of provider assignment: page job code [phone]    5 PM - 7 AM: First call after hours is to primary service.    For urgent after-hours questions, page job code for on call fellow: [phone]     I spent a total of  45 minutes on the date of the encounter doing prep/post-work, chart review, history and exam, documentation and further activities per the note including lab review, multidisciplinary communication,  counseling the patient and/or coordinating care regarding acute hyper/hypoglycemic management, as well as discharge management and planning/communication.  See note for details.

## 2024-03-19 NOTE — PROGRESS NOTES
Transplant Surgery  Inpatient Daily Progress Note  2024    Assessment & Plan: Mary Lopez is a 53 year old male with a history of EtOH cirrhosis, complicated by encephalopathy, hyponatremia, chronic thrombocytopenia, macrocytic anemia, with hospitalization (2024) for encephalopathy and suspected SBP, and recent admission -3/2 for Strep bovis bacteremia. He is now s/p a  donor liver transplant on 3/5/24 with biliary stent placement with Dr. Cortez. Pre-op MELD 31.     Liver transplant w/ stent: POD 14   3/15 liver biopsy showed Moderate-to-severe acute cellular/T cell-mediated rejection, PRAJAPATI = 6-7 /9 Solu-medrol 500 mg daily x 3 starting 3/16 followed by steroid taper. Liver tests showing improvement.  3/11 HIDA negative for leak. Jose Roberto BID. ASA 325mg daily.    Immunosuppressed due to medications:   Induction: Solu-Medrol/pred taper  Maintenance:  MMF: 750 mg BID  Tac, goal 8-10    Neurology:   Acute postoperative pain:   -Oxycodone, discontinue  -APAP prn  -Lidoderm  Delirium: Slept last night with Seroquel 25 mg. Promote day/night sleep pattern.    Hematology:   Acute blood loss anemia: Hgb ~10 Stable.  Thrombocytopenia: Due to liver disease, resolved.     Cardiorespiratory:   Bilateral pleural effusions: 3/7 CXR L pleural effusion stable, R pleural effusion slightly improved.  Suspected JAYLIN: Sleep study previously recommended to pt. STOP-BANG score 6/8 in 10/2023. Requiring O2 overnight only.  Atrial fib RVR: Onset 3/11 with rate 180s in setting of hypoK and hypoMg. Converted to NSR with metoprolol.   Wide complex tachycardia: VT vs AF with aberrant conduction. 17 beats on 3/12. Asymptomatic.     GI/Nutrition:   Severe malnutrition in the context of acute illness: Continue low K diet and cycled tube feeds. Minimal oral intake.   Diarrhea: Bowel regimen changed to PRN.    Endocrine:   Insulin-dependent type 2 diabetes mellitus w/ steroid hyperglycemia: Endo following. Insulin  ggt stopped 3/13. Lantus + NPH + Novolog Sliding scale with carb coverage and sliding scale.    Fluid/Electrolytes/Renal:   Hypokalemia: supplements, resolved  Hyperkalemia: stopped supplements. Held Bactrim.   Hypomagnesemia: Continue oral 800 MagOx BID  ANGEL, oliguric. Nephrology consulted, CRRT initiated 3/7-3/9. Cr normalized, now increased to 1.5 yesterday and today, will hold diuresis and monitor.   Hypervolemia: Bumex BID and lymphedema wraps, improving. Hold Bumex for now.  Hypophosphatemia: resolved     : No acute issues.     Infectious disease: No acute issues.     Prophylaxis: fungal (Micafungin x14 days d/t CRRT), PJP (TMP/sulfa on hold, consider restart when Cr improving), CMV (valganciclovir)    Disposition: 7A, PT/OT      JOSELYN/Fellow/Resident Provider: Lesley Wise PA-C    Faculty: Joanna Goldman MD     __________________________________________________________________    Interval History: History is obtained from the electronic health record and patient     Overnight events: Mental status improving, slept overnight.     ROS:   A 10-point review of systems was negative except as noted above.    Curent Meds:   acetaminophen  650 mg Oral or Feeding Tube Q8H    aspirin  325 mg Oral or Feeding Tube Daily    [Held by provider] bumetanide  2 mg Oral BID    insulin aspart  1-10 Units Subcutaneous TID AC    insulin aspart  1-10 Units Subcutaneous BID    insulin aspart   Subcutaneous TID w/meals    insulin glargine  28 Units Subcutaneous Q24H    insulin NPH  45 Units Subcutaneous Q24H    lidocaine  2 patch Transdermal Q24H    magnesium oxide  800 mg Oral BID    [START ON 3/20/2024] methylPREDNISolone  162.5 mg Intravenous Once    micafungin  100 mg Intravenous Daily    multivitamin w/minerals  1 tablet Oral Daily    mycophenolate  750 mg Oral BID IS    Or    mycophenolate  750 mg Oral or NG Tube BID IS    pantoprazole  40 mg Oral Daily    protein modular  1 packet Per Feeding Tube Daily    QUEtiapine  25 mg  "Oral At Bedtime    sodium chloride (PF)  3 mL Intravenous Q8H    [Held by provider] sulfamethoxazole-trimethoprim  1 tablet Oral or Feeding Tube QPM    tacrolimus  8 mg Oral or Feeding Tube BID IS    thiamine  100 mg Oral or Feeding Tube Daily    ursodiol  300 mg Oral BID    Or    ursodiol  300 mg Oral or NG Tube BID    valGANciclovir  450 mg Oral QPM    Or    valGANciclovir  450 mg Oral or NG Tube QPM       Physical Exam:     Admit Weight: 107.2 kg (236 lb 4.8 oz)    Current Vitals:   /75 (BP Location: Left arm)   Pulse 92   Temp 98.2  F (36.8  C) (Oral)   Resp 16   Ht 1.73 m (5' 8.11\")   Wt 89.7 kg (197 lb 12.8 oz)   SpO2 99%   BMI 29.98 kg/m      Vital sign ranges:    Temp:  [97.5  F (36.4  C)-98.2  F (36.8  C)] 98.2  F (36.8  C)  Pulse:  [] 92  Resp:  [14-16] 16  BP: (108-120)/(62-77) 110/75  SpO2:  [97 %-100 %] 99 %  Patient Vitals for the past 24 hrs:   BP Temp Temp src Pulse Resp SpO2   03/19/24 1012 110/75 98.2  F (36.8  C) Oral 92 16 99 %   03/19/24 0506 111/67 97.8  F (36.6  C) Oral 93 14 97 %   03/19/24 0019 108/62 97.5  F (36.4  C) Oral 101 14 99 %   03/18/24 2159 120/77 98  F (36.7  C) Oral 87 16 97 %   03/18/24 1830 115/68 98  F (36.7  C) Oral 82 16 100 %     General Appearance: NAD  Skin: normal, warm  Heart: NSR  Lungs: Unlabored, RA  Abdomen: Soft, appropriately tender, ND,  Incision CDI  : voiding with good urine output  Extremities: edema: bilateral lower extremity 2+   Neurologic: A&Ox1. Tremors present in bilateral hands.     Frailty Scores          12/19/2023   Frailty Scores   Final Score Frail   Final Score Number 4       Data:   CMP  Recent Labs   Lab 03/19/24  1418 03/19/24  1235 03/19/24  0508 03/19/24  0438 03/18/24  0904 03/18/24  0541   NA  --   --   --  136  --  133*   POTASSIUM  --   --   --  4.4  --  4.8   CHLORIDE  --   --   --  99  --  96*   CO2  --   --   --  24  --  25   * 102*   < > 153*   < > 237*   BUN  --   --   --  61.8*  --  55.2*   CR  --   --   " "--  1.46*  --  1.49*   GFRESTIMATED  --   --   --  58*  --  56*   MEG  --   --   --  8.9  --  9.1   MAG  --   --   --  1.7  --  1.6*   PHOS  --   --   --  4.5  --  4.8*   ALBUMIN  --   --   --  2.9*  --  2.9*   BILITOTAL  --   --   --  1.1  --  1.3*   ALKPHOS  --   --   --  310*  --  334*   AST  --   --   --  47*  --  52*   ALT  --   --   --  87*  --  103*    < > = values in this interval not displayed.     CBC  Recent Labs   Lab 03/19/24  0438 03/18/24  0541   HGB 9.7* 10.0*   WBC 11.9* 14.6*    312     COAGS  No lab results found in last 7 days.    Invalid input(s): \"XA\"     Urinalysis  Recent Labs   Lab Test 03/04/24  1042 02/23/24  1558   COLOR Yellow Yellow   APPEARANCE Clear Clear   URINEGLC 500* Negative   URINEBILI Negative Small*   URINEKETONE Negative Negative   SG 1.007 1.009   UBLD Negative Negative   URINEPH 5.0 5.0   PROTEIN Negative Negative   NITRITE Negative Negative   LEUKEST Negative Negative   RBCU <1 <1   WBCU <1 <1     Virology:  Hepatitis C Antibody   Date Value Ref Range Status   03/04/2024 Nonreactive Nonreactive Final     Comment:     A nonreactive screening test result does not exclude the possibility of exposure to or infection with HCV. Nonreactive screening test results in individuals with prior exposure to HCV may be due to antibody levels below the limit of detection of this assay or lack of reactivity to the HCV antigens used in this assay. Patients with recent HCV infections (<3 months from time of exposure) may have false-negative HCV antibody results due to the time needed for seroconversion (average of 8 to 9 weeks).       "

## 2024-03-19 NOTE — PLAN OF CARE
"/62 (BP Location: Left arm)   Pulse 101   Temp 97.5  F (36.4  C) (Oral)   Resp 14   Ht 1.73 m (5' 8.11\")   Wt 89.7 kg (197 lb 12.8 oz)   SpO2 99%   BMI 29.98 kg/m      Shift: 7838-3662  VS: VSS on RA, afebrile  Neuro: aox1 (self), cooperative  BG: q4 (225, 222, 143) received 110 units NPH in evening; sliding scale with carb coverage (PRN novolog available)  Labs: LFTs improving  Respiratory: WNL  Pain/Nausea/PRN: denies pain or nausea  Diet: 3g k  LDA: NJ with TF @ 65 (8p-8a); ostomy pouch over old TIMOTHY site w/ scant output  GI/: voids, Bm x1 loose  Skin: clamshell incision  Mobility: SBA with walker, BA/CA on when family is not present  Plan: continue POC    Handoff given to following RN.                          "

## 2024-03-19 NOTE — PROGRESS NOTES
"/75 (BP Location: Left arm)   Pulse 92   Temp 98.2  F (36.8  C) (Oral)   Resp 16   Ht 1.73 m (5' 8.11\")   Wt 89.7 kg (197 lb 12.8 oz)   SpO2 99%   BMI 29.98 kg/m      Shift: 3223-7172  Isolation Status: Contact  VS: VSS on room air, afebrile  Neuro: Aox3, disoriented to time  Behaviors: calm and cooperative  BG: ACHS.   Respiratory: WDL  Cardiac: WDL  Pain/Nausea: No complaints  PRN: Dextrose 50% 25 mL for blood sugar of 54  Diet: 3g K, carb count, calorie count, cycled tube feeds at goal rate of 65mL/hr 8p-8a  IV Access: L PIV  Infusion(s): TKO  Lines/Drains: Old TIMOTHY site pouched with minimal drainage  GI/: voiding, BM-3/19 loose  Skin: clamshell incision, stapled, approximated, open to air  Mobility: SBA with walker  Plan: Will continue with plan of care  "

## 2024-03-19 NOTE — PLAN OF CARE
"/75 (BP Location: Left arm)   Pulse 92   Temp 98.2  F (36.8  C) (Oral)   Resp 16   Ht 1.73 m (5' 8.11\")   Wt 90.5 kg (199 lb 9.6 oz)   SpO2 99%   BMI 30.25 kg/m       Patient oriented to self and intermittently oriented to place. Disoriented to time and situation. VS stable. Patient on room air. Patient denies pain. Patient denies nausea. BG - Q4h. Carbohydrate coverage. Urine Output - voiding adequately. Bowel Function - BM during shift. Nutrition - 3 g Potassium Diet. Calorie counts. Cycled tube feeds at goal rate of 65 ml/hr via NJ tube from 2000 to 0800. PIV - saline locked. Clamshell incision - approximated, stapled, open to air. Old TIMOTHY site with ostomy bag in place. Dark red output. Activity - SBA. Bed alarm in use. Patient ambulated in hallway multiple times throughout shift. Med card and lab book up to date. Wife at bedside and attentive to patient. Plan of Care - Will continue with plan of care and notify care team of any changes.      "

## 2024-03-20 ENCOUNTER — PATIENT OUTREACH (OUTPATIENT)
Dept: CARE COORDINATION | Facility: CLINIC | Age: 54
End: 2024-03-20
Payer: COMMERCIAL

## 2024-03-20 ENCOUNTER — HOME INFUSION (PRE-WILLOW HOME INFUSION) (OUTPATIENT)
Dept: PHARMACY | Facility: CLINIC | Age: 54
End: 2024-03-20
Payer: COMMERCIAL

## 2024-03-20 ENCOUNTER — APPOINTMENT (OUTPATIENT)
Dept: OCCUPATIONAL THERAPY | Facility: CLINIC | Age: 54
DRG: 005 | End: 2024-03-20
Attending: TRANSPLANT SURGERY
Payer: COMMERCIAL

## 2024-03-20 ENCOUNTER — APPOINTMENT (OUTPATIENT)
Dept: PHYSICAL THERAPY | Facility: CLINIC | Age: 54
DRG: 005 | End: 2024-03-20
Attending: TRANSPLANT SURGERY
Payer: COMMERCIAL

## 2024-03-20 PROBLEM — I48.91 ATRIAL FIBRILLATION WITH RVR (H): Status: ACTIVE | Noted: 2024-03-11

## 2024-03-20 PROBLEM — D84.9 IMMUNOSUPPRESSED STATUS (H): Chronic | Status: ACTIVE | Noted: 2024-03-20

## 2024-03-20 PROBLEM — Z94.4 LIVER REPLACED BY TRANSPLANT (H): Status: ACTIVE | Noted: 2024-03-05

## 2024-03-20 PROBLEM — G89.18 ACUTE POST-OPERATIVE PAIN: Status: ACTIVE | Noted: 2024-03-20

## 2024-03-20 PROBLEM — Z94.4 LIVER REPLACED BY TRANSPLANT (H): Chronic | Status: ACTIVE | Noted: 2024-03-05

## 2024-03-20 PROBLEM — D84.9 IMMUNOSUPPRESSED STATUS (H): Status: ACTIVE | Noted: 2024-03-20

## 2024-03-20 PROBLEM — K72.90 DECOMPENSATED HEPATIC CIRRHOSIS (H): Status: RESOLVED | Noted: 2023-09-15 | Resolved: 2024-03-20

## 2024-03-20 PROBLEM — J90 PLEURAL EFFUSION: Status: ACTIVE | Noted: 2024-03-20

## 2024-03-20 PROBLEM — R41.0 DELIRIUM: Status: ACTIVE | Noted: 2024-03-20

## 2024-03-20 PROBLEM — Z94.4 LIVER REPLACED BY TRANSPLANT (H): Status: ACTIVE | Noted: 2024-03-20

## 2024-03-20 PROBLEM — K74.60 CIRRHOSIS (H): Status: RESOLVED | Noted: 2024-03-05 | Resolved: 2024-03-20

## 2024-03-20 PROBLEM — Z76.82 LIVER TRANSPLANT CANDIDATE: Status: RESOLVED | Noted: 2024-02-15 | Resolved: 2024-03-20

## 2024-03-20 PROBLEM — Z79.4 TYPE 2 DIABETES MELLITUS WITHOUT COMPLICATION, WITH LONG-TERM CURRENT USE OF INSULIN (H): Chronic | Status: ACTIVE | Noted: 2023-11-22

## 2024-03-20 PROBLEM — E11.9 TYPE 2 DIABETES MELLITUS WITHOUT COMPLICATION, WITH LONG-TERM CURRENT USE OF INSULIN (H): Chronic | Status: ACTIVE | Noted: 2023-11-22

## 2024-03-20 PROBLEM — E11.9 TYPE 2 DIABETES MELLITUS WITHOUT COMPLICATION, WITH LONG-TERM CURRENT USE OF INSULIN (H): Status: ACTIVE | Noted: 2023-11-22

## 2024-03-20 PROBLEM — K72.00 ACUTE LIVER FAILURE WITHOUT HEPATIC COMA: Status: RESOLVED | Noted: 2024-02-23 | Resolved: 2024-03-20

## 2024-03-20 PROBLEM — K74.60 DECOMPENSATED HEPATIC CIRRHOSIS (H): Status: RESOLVED | Noted: 2023-09-15 | Resolved: 2024-03-20

## 2024-03-20 PROBLEM — R16.1 SPLENOMEGALY: Status: RESOLVED | Noted: 2023-07-13 | Resolved: 2024-03-20

## 2024-03-20 PROBLEM — D69.6 THROMBOCYTOPENIA (H): Chronic | Status: RESOLVED | Noted: 2023-07-13 | Resolved: 2024-03-20

## 2024-03-20 PROBLEM — T86.41 LIVER TRANSPLANT REJECTION (H): Status: ACTIVE | Noted: 2024-03-15

## 2024-03-20 PROBLEM — K70.11 ALCOHOLIC HEPATITIS WITH ASCITES (H): Chronic | Status: RESOLVED | Noted: 2023-10-03 | Resolved: 2024-03-20

## 2024-03-20 PROBLEM — E43 SEVERE MALNUTRITION (H): Status: ACTIVE | Noted: 2023-09-06

## 2024-03-20 PROBLEM — Z79.4 TYPE 2 DIABETES MELLITUS WITHOUT COMPLICATION, WITH LONG-TERM CURRENT USE OF INSULIN (H): Status: ACTIVE | Noted: 2023-11-22

## 2024-03-20 LAB
ALBUMIN SERPL BCG-MCNC: 3 G/DL (ref 3.5–5.2)
ALP SERPL-CCNC: 325 U/L (ref 40–150)
ALT SERPL W P-5'-P-CCNC: 87 U/L (ref 0–70)
ANION GAP SERPL CALCULATED.3IONS-SCNC: 11 MMOL/L (ref 7–15)
AST SERPL W P-5'-P-CCNC: 49 U/L (ref 0–45)
BILIRUB SERPL-MCNC: 1.1 MG/DL
BUN SERPL-MCNC: 59.6 MG/DL (ref 6–20)
CALCIUM SERPL-MCNC: 9.4 MG/DL (ref 8.6–10)
CHLORIDE SERPL-SCNC: 99 MMOL/L (ref 98–107)
CREAT SERPL-MCNC: 1.29 MG/DL (ref 0.67–1.17)
DEPRECATED HCO3 PLAS-SCNC: 25 MMOL/L (ref 22–29)
EGFRCR SERPLBLD CKD-EPI 2021: 67 ML/MIN/1.73M2
ERYTHROCYTE [DISTWIDTH] IN BLOOD BY AUTOMATED COUNT: 19 % (ref 10–15)
GLUCOSE BLDC GLUCOMTR-MCNC: 114 MG/DL (ref 70–99)
GLUCOSE BLDC GLUCOMTR-MCNC: 139 MG/DL (ref 70–99)
GLUCOSE BLDC GLUCOMTR-MCNC: 152 MG/DL (ref 70–99)
GLUCOSE BLDC GLUCOMTR-MCNC: 201 MG/DL (ref 70–99)
GLUCOSE BLDC GLUCOMTR-MCNC: 216 MG/DL (ref 70–99)
GLUCOSE BLDC GLUCOMTR-MCNC: 299 MG/DL (ref 70–99)
GLUCOSE SERPL-MCNC: 177 MG/DL (ref 70–99)
HCT VFR BLD AUTO: 31.1 % (ref 40–53)
HGB BLD-MCNC: 10.3 G/DL (ref 13.3–17.7)
MAGNESIUM SERPL-MCNC: 1.8 MG/DL (ref 1.7–2.3)
MCH RBC QN AUTO: 31.9 PG (ref 26.5–33)
MCHC RBC AUTO-ENTMCNC: 33.1 G/DL (ref 31.5–36.5)
MCV RBC AUTO: 96 FL (ref 78–100)
PHOSPHATE SERPL-MCNC: 4.3 MG/DL (ref 2.5–4.5)
PLATELET # BLD AUTO: 361 10E3/UL (ref 150–450)
POTASSIUM SERPL-SCNC: 4 MMOL/L (ref 3.4–5.3)
PROT SERPL-MCNC: 5.6 G/DL (ref 6.4–8.3)
RBC # BLD AUTO: 3.23 10E6/UL (ref 4.4–5.9)
SODIUM SERPL-SCNC: 135 MMOL/L (ref 135–145)
TACROLIMUS BLD-MCNC: 6.3 UG/L (ref 5–15)
TME LAST DOSE: NORMAL H
TME LAST DOSE: NORMAL H
WBC # BLD AUTO: 8.2 10E3/UL (ref 4–11)

## 2024-03-20 PROCEDURE — 97535 SELF CARE MNGMENT TRAINING: CPT | Mod: GO | Performed by: OCCUPATIONAL THERAPIST

## 2024-03-20 PROCEDURE — 99232 SBSQ HOSP IP/OBS MODERATE 35: CPT | Mod: 24 | Performed by: STUDENT IN AN ORGANIZED HEALTH CARE EDUCATION/TRAINING PROGRAM

## 2024-03-20 PROCEDURE — 250N000012 HC RX MED GY IP 250 OP 636 PS 637: Performed by: TRANSPLANT SURGERY

## 2024-03-20 PROCEDURE — 250N000012 HC RX MED GY IP 250 OP 636 PS 637: Performed by: PHYSICIAN ASSISTANT

## 2024-03-20 PROCEDURE — 250N000013 HC RX MED GY IP 250 OP 250 PS 637: Performed by: TRANSPLANT SURGERY

## 2024-03-20 PROCEDURE — 250N000011 HC RX IP 250 OP 636: Mod: JZ | Performed by: NURSE PRACTITIONER

## 2024-03-20 PROCEDURE — 250N000013 HC RX MED GY IP 250 OP 250 PS 637: Performed by: NURSE PRACTITIONER

## 2024-03-20 PROCEDURE — 258N000003 HC RX IP 258 OP 636: Performed by: NURSE PRACTITIONER

## 2024-03-20 PROCEDURE — 250N000013 HC RX MED GY IP 250 OP 250 PS 637: Performed by: PHYSICIAN ASSISTANT

## 2024-03-20 PROCEDURE — 250N000013 HC RX MED GY IP 250 OP 250 PS 637

## 2024-03-20 PROCEDURE — 36415 COLL VENOUS BLD VENIPUNCTURE: CPT | Performed by: NURSE PRACTITIONER

## 2024-03-20 PROCEDURE — 250N000012 HC RX MED GY IP 250 OP 636 PS 637: Performed by: NURSE PRACTITIONER

## 2024-03-20 PROCEDURE — 999N000128 HC STATISTIC PERIPHERAL IV START W/O US GUIDANCE

## 2024-03-20 PROCEDURE — 85027 COMPLETE CBC AUTOMATED: CPT | Performed by: NURSE PRACTITIONER

## 2024-03-20 PROCEDURE — 80053 COMPREHEN METABOLIC PANEL: CPT | Performed by: NURSE PRACTITIONER

## 2024-03-20 PROCEDURE — 97116 GAIT TRAINING THERAPY: CPT | Mod: GP

## 2024-03-20 PROCEDURE — 83735 ASSAY OF MAGNESIUM: CPT | Performed by: NURSE PRACTITIONER

## 2024-03-20 PROCEDURE — 250N000011 HC RX IP 250 OP 636: Performed by: PHYSICIAN ASSISTANT

## 2024-03-20 PROCEDURE — 258N000003 HC RX IP 258 OP 636: Performed by: PHYSICIAN ASSISTANT

## 2024-03-20 PROCEDURE — 84100 ASSAY OF PHOSPHORUS: CPT | Performed by: NURSE PRACTITIONER

## 2024-03-20 PROCEDURE — 80197 ASSAY OF TACROLIMUS: CPT | Performed by: NURSE PRACTITIONER

## 2024-03-20 PROCEDURE — 120N000011 HC R&B TRANSPLANT UMMC

## 2024-03-20 RX ORDER — KETOCONAZOLE 200 MG/1
100 TABLET ORAL DAILY
Status: DISCONTINUED | OUTPATIENT
Start: 2024-03-20 | End: 2024-03-21 | Stop reason: HOSPADM

## 2024-03-20 RX ORDER — METHYLPREDNISOLONE SODIUM SUCCINATE 125 MG/2ML
120 INJECTION, POWDER, LYOPHILIZED, FOR SOLUTION INTRAMUSCULAR; INTRAVENOUS ONCE
Status: COMPLETED | OUTPATIENT
Start: 2024-03-21 | End: 2024-03-21

## 2024-03-20 RX ADMIN — MAGNESIUM OXIDE TAB 400 MG (241.3 MG ELEMENTAL MG) 800 MG: 400 (241.3 MG) TAB at 11:51

## 2024-03-20 RX ADMIN — HYDROXYZINE HYDROCHLORIDE 25 MG: 25 TABLET, FILM COATED ORAL at 21:47

## 2024-03-20 RX ADMIN — SULFAMETHOXAZOLE AND TRIMETHOPRIM 1 TABLET: 400; 80 TABLET ORAL at 20:25

## 2024-03-20 RX ADMIN — TACROLIMUS 6 MG: 5 CAPSULE ORAL at 17:31

## 2024-03-20 RX ADMIN — PANTOPRAZOLE SODIUM 40 MG: 40 TABLET, DELAYED RELEASE ORAL at 08:16

## 2024-03-20 RX ADMIN — ASPIRIN 325 MG ORAL TABLET 325 MG: 325 PILL ORAL at 08:16

## 2024-03-20 RX ADMIN — URSODIOL 300 MG: 300 CAPSULE ORAL at 08:16

## 2024-03-20 RX ADMIN — TACROLIMUS 8 MG: 5 CAPSULE ORAL at 08:16

## 2024-03-20 RX ADMIN — MAGNESIUM OXIDE TAB 400 MG (241.3 MG ELEMENTAL MG) 800 MG: 400 (241.3 MG) TAB at 21:47

## 2024-03-20 RX ADMIN — URSODIOL 300 MG: 300 CAPSULE ORAL at 20:25

## 2024-03-20 RX ADMIN — INSULIN ASPART 10 UNITS: 100 INJECTION, SOLUTION INTRAVENOUS; SUBCUTANEOUS at 19:06

## 2024-03-20 RX ADMIN — Medication 100 MG: at 17:31

## 2024-03-20 RX ADMIN — INSULIN ASPART 4 UNITS: 100 INJECTION, SOLUTION INTRAVENOUS; SUBCUTANEOUS at 08:45

## 2024-03-20 RX ADMIN — ACETAMINOPHEN 650 MG: 325 SOLUTION ORAL at 20:36

## 2024-03-20 RX ADMIN — HYDROXYZINE HYDROCHLORIDE 25 MG: 25 TABLET, FILM COATED ORAL at 19:06

## 2024-03-20 RX ADMIN — THIAMINE HCL TAB 100 MG 100 MG: 100 TAB at 08:16

## 2024-03-20 RX ADMIN — INSULIN ASPART 7 UNITS: 100 INJECTION, SOLUTION INTRAVENOUS; SUBCUTANEOUS at 17:35

## 2024-03-20 RX ADMIN — MYCOPHENOLATE MOFETIL 750 MG: 250 CAPSULE ORAL at 17:31

## 2024-03-20 RX ADMIN — Medication 1 TABLET: at 08:16

## 2024-03-20 RX ADMIN — SODIUM CHLORIDE 162.5 MG: 9 INJECTION, SOLUTION INTRAVENOUS at 08:16

## 2024-03-20 RX ADMIN — QUETIAPINE FUMARATE 25 MG: 25 TABLET ORAL at 21:47

## 2024-03-20 RX ADMIN — MICAFUNGIN SODIUM 100 MG: 50 INJECTION, POWDER, LYOPHILIZED, FOR SOLUTION INTRAVENOUS at 11:51

## 2024-03-20 RX ADMIN — MYCOPHENOLATE MOFETIL 750 MG: 250 CAPSULE ORAL at 08:16

## 2024-03-20 RX ADMIN — Medication 1 PACKET: at 08:16

## 2024-03-20 RX ADMIN — INSULIN ASPART 4 UNITS: 100 INJECTION, SOLUTION INTRAVENOUS; SUBCUTANEOUS at 13:36

## 2024-03-20 RX ADMIN — VALGANCICLOVIR HYDROCHLORIDE 450 MG: 450 TABLET ORAL at 20:25

## 2024-03-20 ASSESSMENT — ACTIVITIES OF DAILY LIVING (ADL)
ADLS_ACUITY_SCORE: 30

## 2024-03-20 NOTE — PROGRESS NOTES
Care Management Discharge Note    Discharge Date: 03/21/2024    Discharge Disposition: Home, Home Care, Home Infusion    Discharge Services: None    Discharge DME: None    Discharge Transportation: family will provide    Private pay costs discussed: Not applicable    Does the patient's insurance plan have a 3 day qualifying hospital stay waiver?  No    PAS Confirmation Code:  n/a  Patient/family educated on Medicare website which has current facility and service quality ratings: no    Education Provided on the Discharge Plan: Yes  Persons Notified of Discharge Plans: patient, wife Adina  Patient/Family in Agreement with the Plan: yes    Handoff Referral Completed: No, care coordinator to complete    Additional Information:  Discharge recommendations changed to home with home care/home infusion.  Mary's wife Adina is in agreement with this plan, and is accepting of her care giver role.      Transplant Social Work Service Discharge Note        Education and resources provided by SUMEET at discharge: role of transplant  in out patient setting and provided contact info for , availability of support group    Discussed anticipated pharmacy out of pocket costs: YES    Provided [employer] Donor Letter Writing packet : NO, patient not fully oriented therefore I will review this education outpatient.    Plan: I will remain available throughout patient's hospitalization, and outpatient as well.        CAPRI Brock, [facility]  Liver Transplant   Phone [phone]

## 2024-03-20 NOTE — PROGRESS NOTES
Therapy: Enteral  Insurance: [employer]   Ded: $4500  Met: $2763    Co-Insurance: 80/20  Max Out of Pocket: $6500  Met: $4689    Pt s plan reqs sole source of nutrition for coverage- Per our RD s pt does not currently meet criteria. Pt must agree to self-pay for formula costs, but their supply costs will be covered through their [employer] plan. Pt s currently met $2763/$4500 towards their deductible (80/20 coverage once met) and $4689/$6500 towards their out of pocket. Once out of pocket has been satisfied pt will be covered at 100%. Based off the Novasource Renal @ 3.5 cartons QD, and Prosource TF 20 BID the pt s daily formula cost estimate came out to $25.44.    In reference to referral from [facility] on pt admitted 03/04/24 to check for Enteral coverage.    Please contact Intake with any questions, [phone] or In Basket pool, [facility] ([phone]).

## 2024-03-20 NOTE — PROGRESS NOTES
Calorie Count  Intake recorded for: 3/19  Total Kcals: 1261 Total Protein: 47g  Kcals from Hospital Food: 1261   Protein: 47g  Kcals from Outside Food (average):0 Protein: 0g  # Meals Ordered from Kitchen: 2  # Meals Recorded: 2  (1: 100% scrambled eggs w/american cheese, 4oz grape juice, applesauce)  (2: 100% 4oz apple juice, diced peaches; 75% grilled cheese(american))  # Supplements Recorded: 3 (100% 2 - Glucerna; 50% 1 magic cup)

## 2024-03-20 NOTE — PLAN OF CARE
"/59 (BP Location: Right arm)   Pulse 88   Temp 97.5  F (36.4  C) (Oral)   Resp 16   Ht 1.73 m (5' 8.11\")   Wt 90.5 kg (199 lb 8 oz)   SpO2 97%   BMI 30.24 kg/m      Shift: 8755-9832  VS: VSs on RA, afebrile  Neuro: Aox2, disoriented to situation and time  BG: Q4 (ACHS during the day), 261, 201; NPH 45 units with TF  Labs: am labs, LFTs improving  Respiratory: WNL  Pain/Nausea/PRN: denies nausea, tylenol x1 for back pain  Diet: 3g K, on huber counts, at 75% of dinner  LDA: PIV SL, NJ with TF @ 65 (8p-8a)  GI/: voids, LBM yesterday  Skin: clamshell stapled, RON; old R TIMOTHY site bagged w/ scant output  Mobility: SBA, BA on  Plan: continue POC    Handoff given to following RN.                        "

## 2024-03-20 NOTE — PROGRESS NOTES
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 Insulin drip since operation with high needs on steroid taper and continuous tube feeds. Inpatient diabetes management service consulted on 3/13/24 for assistance in management.           Assessment/Plan:     Assessment:   Insulin dependent  Diabetes Mellitus, without known complications, sub optimal control based on hyperglycemia  (A1c 4.8% (2/25/2024)  - A1c inaccurte in setting of anemia and RBC transfusions the day prior.)  ETOH cirrhosis s/p liver transplant  Tube feed related hyperglycemia  Stress related  hyperglycemia      Plan/Recommendations:    - Continue Lantus 28 units q 24 hrs at 1200.  - Decrease NPH from 45 to 34 units at 2000 with tube feeding (with less steroids today). Hold if tube feed on hold.   - Novolog Meal Coverage: 1 units per 10 g CHO, TID AC and PRN with snacks/supplements   - Novolog Correction Scale: change to 1:25 >140 TID AC; and 1:25 >140 2200 & 0200 (higher overnight while on tube feeding)  - BG monitoring: TID AC, HS, 0200  - Hypoglycemia protocol    - Carb counting protocol      Discussion: Hyperglycemia last night following eating and start of TF, resolved overnight into today. Steroids tapered again today. No NPH with steroids this morning. Will reduce NPH with cyclic TF tonight. Will continue current Novolog ICR today as steroids taper daily.     Planning to discharge tomorrow. Per primary team, steroid plan for discharge: Methylprednisolone 118 mg ordered tomorrow. Then decreasing daily to Methylprednisolone 80 mg , 40 mg, 20 mg. NO steroids after completing 20 mg dose. Spoke with dietician, TF plan for discharge not finalized yet - pending calorie counts today. Will reach out to  "dietician in AM.      Discharge Planning: (tentative)   Medications: Test claim for basal insulin ordered 3/20 - would not like patient to go home on PTA tresiba while on steroids. Humalog set meals doses plus correction scale (likely 1:50 >150 TID AC and 1:50 >200 HS). NPH once daily with TF pending TF formula/rate/duration.   Test Claims: See PERRY Heard note 3/14- Humulin N kwikpens covered at $0. Test claim added 3/20 - basal insulin (not tresiba) and glucagon - patient's wife requesting glucagon at discharge.   Education:  Per wife, follows closely outpatient with diabetes educator, Liana Garcia at [facility].   Outpatient Follow-up:  recommend [facility] Endocr[facility]ology, seen by Suzanne Parra PA-C in the past. Also recommend 1-2 weeks with diabetes educator.   Supplies needed at discharge: Dexcom G7 sensors, Glucagon (pending insurance preference), basal insulin,     Please notify Inpatient Diabetes Service if changes are planned to steroids, nutrition, TPN/TF and anticipated procedures requiring prolonged NPO status.           Interval History/Review of Systems :   - The last 24 hours progress and nursing notes reviewed.  - Patient feeling good today, working on increasing food intake. No nausea or emesis. Sometimes feels \"pit in stomach\" and doesn't want to eat. Remains on cyclic TF.  - Steroid tapered again today.  - Per patient, discharging home tomorrow. Would like more Dexcom G7 sensors at discharge. Reports having glucometer plus enough supplies at home. They would also like glucagon at discharge.       Planned Procedures/Surgeries: none    Inpatient Glucose Control:       Recent Labs   Lab 03/20/24  0621 03/20/24  0436 03/20/24  0140 03/19/24  2228 03/19/24  1755 03/19/24  1418   * 177* 201* 261* 157* 109*             Medications Impacting Glycemia:   Steroids:   3/11 - 3/16: prednisone 5mg daily   3/16: Methylprednisolone 500 mg once at 1300  3/17: Methylprednisolone 500 mg once at 1000  3/18: " "Methylprednisolone 500 mg once at 0900  3/19: Methylprednisolone 200 mg once at 0800   3/20: Methylprednisolone 162.5 mg once at 0800  steroid taper plan per primary team: Methylprednisolone 120 mg tomorrow, followed by 80 mg , 40 mg, 20 mg then none.    D5W containing solutions/medications: none  Other medications impacting glucose:  anti-rejection medications         Nutrition:   Orders Placed This Encounter      3 Gram Potassium (low potassium) Diet  Supplements:  Glucerna at 1400   TF: Novasource Renal @ 50 mL/hr cycled 8pm-8am - 110 g CHO (changed from Two Alli HN 3/17) --> increased 3/18  to Novasource Renal @ 65 mL/hr cycled 8pm-8am - 143g CHO   TPN: none         Diabetes History: see full consult note for complete diabetes history   Diabetes Type and Duration:   T2DM diagnosed 10/31/2023 with A1c 8.1%.  GAD65 negative 11/3/2023, IA-2 negative 11/3/2023, C -peptide normal (3.7) 10/31/2023.      Diabetes Medications:                   1) Tresiba 36 units once daily    2) Humalog 15 units with breakfast and lunch, 13 units with dinner plus correction scale 1 unit per 35 >175 mg/dL and 1 unit per 35 >210 at bedtime.   Historical Diabetes Medications: none, reports outpatient diabetes educator was looking into an insulin pump.     BG monitoring device & frequency:  Dexcom G7, also uses glucometer, sometimes has issues with android and Dexcom G7.   Outpatient Diabetes Provider:  [facility] Endocrinology, Suzanne Todd (ALLY). Last visit 11/28/2023  Formal Diabetes Education/Educator: Liana Garcia. Last visit 2/23/2024.         Physical Exam:   /68 (BP Location: Right arm)   Pulse 94   Temp 98  F (36.7  C) (Oral)   Resp 16   Ht 1.73 m (5' 8.11\")   Wt 90.5 kg (199 lb 8 oz)   SpO2 98%   BMI 30.24 kg/m    General Appearance:  No acute distress, resting in bed. Tube feed off. Wife at bedside.   HEENT: Normocephalic, atraumatic. Anicteric, conjunctiva clear.  Lungs:  Breathing comfortably on room air.   Skin:  " Warm and dry.   Neuro: Mild confusion, no focal deficits.  Psych:  Calm, appropriate mood and affect.         Data:     Lab Results   Component Value Date    A1C 4.8 02/25/2024    A1C 7.7 12/19/2023    A1C 8.1 10/31/2023    A1C 5.1 08/23/2023    A1C 5.7 03/12/2022     BMP  Recent Labs   Lab 03/20/24  0621 03/20/24  0436 03/20/24  0140 03/19/24  2228 03/19/24  0508 03/19/24  0438 03/18/24  0904 03/18/24  0541 03/17/24  1603 03/17/24  1335 03/17/24  0503 03/17/24 0427   NA  --  135  --   --   --  136  --  133*  --   --   --  131*   POTASSIUM  --  4.0  --   --   --  4.4  --  4.8  --  4.9  --  5.7*   CHLORIDE  --  99  --   --   --  99  --  96*  --   --   --  97*   MEG  --  9.4  --   --   --  8.9  --  9.1  --   --   --  9.3   CO2  --  25  --   --   --  24  --  25  --   --   --  25   BUN  --  59.6*  --   --   --  61.8*  --  55.2*  --   --   --  40.7*   CR  --  1.29*  --   --   --  1.46*  --  1.49*  --   --   --  1.24*   * 177* 201* 261*   < > 153*   < > 237*   < >  --    < > 295*    < > = values in this interval not displayed.     CBC  Recent Labs   Lab 03/20/24  0436 03/19/24  0438 03/18/24  0541 03/17/24 0427   WBC 8.2 11.9* 14.6* 10.3   RBC 3.23* 3.16* 3.22* 3.13*   HGB 10.3* 9.7* 10.0* 10.0*   HCT 31.1* 29.9* 31.9* 30.4*   MCV 96 95 99 97   MCH 31.9 30.7 31.1 31.9   [facility] 33.1 32.4 31.3* 32.9   RDW 19.0* 19.2* 19.9* 19.7*    [phone]     Inpatient Diabetes Service will continue to follow, please don't hesitate to contact the team with any questions or concerns.     Luba Dennis PA-C  Inpatient Diabetes Service  Pager: [phone]  Available on vocera    Plan discussed with patient, bedside RN, and primary team via this note.    To contact Inpatient Diabetes Service:     7 AM - 5 PM: Page the IDS JOSELYN following the patient that day (see filed or incomplete progress notes/consult notes under Endocrinology)    OR if uncertain of provider assignment: page job code [phone]    5 PM - 7 AM: First call after hours  is to primary service.    For urgent after-hours questions, page job code for on call fellow: [phone]     I spent a total of 45 minutes on the date of the encounter doing prep/post-work, chart review, history and exam, documentation and further activities per the note including lab review, multidisciplinary communication, counseling the patient and/or coordinating care regarding acute hyper/hypoglycemic management, as well as discharge management and planning/communication.  See note for details.

## 2024-03-20 NOTE — PROGRESS NOTES
Transplant Surgery  Inpatient Daily Progress Note  2024    Assessment & Plan: Mary Lopez is a 53 year old male with a history of EtOH cirrhosis, complicated by encephalopathy, hyponatremia, chronic thrombocytopenia, macrocytic anemia, with hospitalization (2024) for encephalopathy and suspected SBP, and recent admission -3/2 for Strep bovis bacteremia. He is now s/p a  donor liver transplant on 3/5/24 with biliary stent placement with Dr. Cortez. Pre-op MELD 31.     Liver transplant w/ stent: POD 15   3/15 liver biopsy showed Moderate-to-severe acute cellular/T cell-mediated rejection, PRAJAPATI = 6-7 /9 Solu-medrol 500 mg daily x 3 starting 3/16 followed by steroid taper. Liver tests showing improvement.  3/11 HIDA negative for leak. Jose Roberto BID. ASA 325mg daily.    Immunosuppressed due to medications:   Induction: Solu-Medrol/pred taper  Maintenance:  MMF: 750 mg BID  Tac 6 mg BID, goal 8-10. Add ketoconazole boost 3/20.     Neurology:   Acute postoperative pain:   -Oxycodone, discontinue  -APAP prn  -Lidoderm  Delirium: Slept last night with Seroquel 25 mg. Promote day/night sleep pattern.    Hematology:   Acute blood loss anemia: Hgb ~10 Stable.  Thrombocytopenia: Due to liver disease, resolved.     Cardiorespiratory:   Bilateral pleural effusions: 3/7 CXR L pleural effusion stable, R pleural effusion slightly improved.  Suspected JAYLIN: Sleep study previously recommended to pt. STOP-BANG score 6/8 in 10/2023. Requiring O2 overnight only.  Atrial fib RVR: Onset 3/11 with rate 180s in setting of hypoK and hypoMg. Converted to NSR with metoprolol.   Wide complex tachycardia: VT vs AF with aberrant conduction. 17 beats on 3/12. Asymptomatic.     GI/Nutrition:   Severe malnutrition in the context of acute illness: Continue regular diet and cycled tube feeds. Minimal oral intake.   Diarrhea: Bowel regimen changed to PRN.    Endocrine:   Insulin-dependent type 2 diabetes mellitus w/  steroid hyperglycemia: Endo following. Insulin ggt stopped 3/13. Lantus + NPH + Novolog Sliding scale with carb coverage and sliding scale.    Fluid/Electrolytes/Renal:   Hypokalemia: supplements, resolved  Hyperkalemia: 2/2 ANGEL, now resolved  Hypomagnesemia: Continue oral 800 MagOx BID  ANGEL, oliguric. Nephrology consulted, CRRT initiated 3/7-3/9. Cr normalized, increased to 1.5 again, down to 1.3 today with Bumex on hold over the past 2 days  Hypervolemia: Bumex BID and lymphedema wraps, improving. Hold Bumex for now.  Hypophosphatemia: resolved     : No acute issues.     Infectious disease: No acute issues.     Prophylaxis: fungal (Micafungin x14 days d/t CRRT), PJP (TMP/sulfa), CMV (valganciclovir)    Disposition: 7A, PT/OT      JOSELYN/Fellow/Resident Provider: Lesley Wise PA-C    Faculty: Joanna Goldman MD     __________________________________________________________________    Interval History: History is obtained from the electronic health record and patient     Overnight events: Mental status improving, slept overnight.     ROS:   A 10-point review of systems was negative except as noted above.    Curent Meds:   aspirin  325 mg Oral or Feeding Tube Daily    [Held by provider] bumetanide  2 mg Oral BID    insulin aspart  1-10 Units Subcutaneous TID AC    insulin aspart  1-10 Units Subcutaneous BID    insulin aspart   Subcutaneous TID w/meals    insulin glargine  28 Units Subcutaneous Q24H    insulin NPH  45 Units Subcutaneous Q24H    ketoconazole  100 mg Oral Daily    lidocaine  2 patch Transdermal Q24H    magnesium oxide  800 mg Oral BID    [START ON 3/21/2024] methylPREDNISolone  118.75 mg Intravenous Once    micafungin  100 mg Intravenous Daily    multivitamin w/minerals  1 tablet Oral Daily    mycophenolate  750 mg Oral BID IS    pantoprazole  40 mg Oral Daily    protein modular  1 packet Per Feeding Tube Daily    QUEtiapine  25 mg Oral At Bedtime    sodium chloride (PF)  3 mL Intravenous Q8H     "sulfamethoxazole-trimethoprim  1 tablet Oral or Feeding Tube QPM    tacrolimus  6 mg Oral BID IS    thiamine  100 mg Oral or Feeding Tube Daily    ursodiol  300 mg Oral BID    valGANciclovir  450 mg Oral QPM       Physical Exam:     Admit Weight: 107.2 kg (236 lb 4.8 oz)    Current Vitals:   /68 (BP Location: Right arm)   Pulse 94   Temp 98  F (36.7  C) (Oral)   Resp 16   Ht 1.73 m (5' 8.11\")   Wt 90.5 kg (199 lb 8 oz)   SpO2 98%   BMI 30.24 kg/m      Vital sign ranges:    Temp:  [97.5  F (36.4  C)-98  F (36.7  C)] 98  F (36.7  C)  Pulse:  [87-97] 94  Resp:  [16] 16  BP: (105-120)/(59-84) 108/68  SpO2:  [97 %-100 %] 98 %  Patient Vitals for the past 24 hrs:   BP Temp Temp src Pulse Resp SpO2 Weight   03/20/24 0626 108/68 98  F (36.7  C) Oral 94 16 98 % --   03/20/24 0357 -- -- -- -- -- -- 90.5 kg (199 lb 8 oz)   03/20/24 0145 105/59 97.5  F (36.4  C) Oral 88 16 97 % --   03/19/24 2230 120/74 97.5  F (36.4  C) Oral 97 16 99 % --   03/19/24 1816 120/84 98  F (36.7  C) Oral 87 16 100 % --   03/19/24 1638 -- -- -- -- -- -- 90.5 kg (199 lb 9.6 oz)     General Appearance: NAD  Skin: normal, warm  Heart: NSR  Lungs: Unlabored, RA  Abdomen: Soft, appropriately tender, ND,  Incision CDI  : voiding with good urine output  Extremities: edema: bilateral lower extremity 2+   Neurologic: A&Ox1. Tremors present in bilateral hands.     Frailty Scores          12/19/2023   Frailty Scores   Final Score Frail   Final Score Number 4       Data:   CMP  Recent Labs   Lab 03/20/24  1155 03/20/24  0753 03/20/24  0621 03/20/24  0436 03/19/24  0508 03/19/24  0438   NA  --   --   --  135  --  136   POTASSIUM  --   --   --  4.0  --  4.4   CHLORIDE  --   --   --  99  --  99   CO2  --   --   --  25  --  24   * 139*   < > 177*   < > 153*   BUN  --   --   --  59.6*  --  61.8*   CR  --   --   --  1.29*  --  1.46*   GFRESTIMATED  --   --   --  67  --  58*   MEG  --   --   --  9.4  --  8.9   MAG  --   --   --  1.8  --  1.7 " "  PHOS  --   --   --  4.3  --  4.5   ALBUMIN  --   --   --  3.0*  --  2.9*   BILITOTAL  --   --   --  1.1  --  1.1   ALKPHOS  --   --   --  325*  --  310*   AST  --   --   --  49*  --  47*   ALT  --   --   --  87*  --  87*    < > = values in this interval not displayed.     CBC  Recent Labs   Lab 03/20/24  0436 03/19/24  0438   HGB 10.3* 9.7*   WBC 8.2 11.9*    344     COAGS  No lab results found in last 7 days.    Invalid input(s): \"XA\"     Urinalysis  Recent Labs   Lab Test 03/04/24  1042 02/23/24  1558   COLOR Yellow Yellow   APPEARANCE Clear Clear   URINEGLC 500* Negative   URINEBILI Negative Small*   URINEKETONE Negative Negative   SG 1.007 1.009   UBLD Negative Negative   URINEPH 5.0 5.0   PROTEIN Negative Negative   NITRITE Negative Negative   LEUKEST Negative Negative   RBCU <1 <1   WBCU <1 <1     Virology:  Hepatitis C Antibody   Date Value Ref Range Status   03/04/2024 Nonreactive Nonreactive Final     Comment:     A nonreactive screening test result does not exclude the possibility of exposure to or infection with HCV. Nonreactive screening test results in individuals with prior exposure to HCV may be due to antibody levels below the limit of detection of this assay or lack of reactivity to the HCV antigens used in this assay. Patients with recent HCV infections (<3 months from time of exposure) may have false-negative HCV antibody results due to the time needed for seroconversion (average of 8 to 9 weeks).       "

## 2024-03-20 NOTE — CONSULTS
Discharge Pharmacy Test Claim    Patient's [employer] insurance does not cover lantus or basaglar, however Semglee (YFGN) pens are covered with a monthly copay of $0.00. Glucagon was also covered with a monthly copay of $0.00.    Test Claim Copay   Lantus solostar pens not covered   Basaglar pens not covered   Semglee (YFGN) pens 0.00   Glucagon 0.00       Faustina Boudreaux  [facility] Pharmacy Liaison  Phone: [phone] Fax: [phone]  Available on Teams

## 2024-03-20 NOTE — DISCHARGE INSTRUCTIONS
Diet recommendations post-transplant: High calorie (2300 calories/day) and protein diet (at least 115 grams/day) x 8 weeks.  Heart healthy dietary habits long term (low saturated/trans fat, low sodium). Practice food safety precautions. See nutrition handout and food safety booklet for more information.     Magic Cup supplements can be purchased on the following website:  https://www.[employer]/products/magic-cup-wild-berry-4-ounce-pack-of-48    Accepting Home Care Agency  [facility]   [phone]   Fax [phone]  RN, PT, OT    Diabetes Management Team Discharge Recommendations:   BG Monitoring: three times daily before meals and at bedtime. Ok to use Dexcom G7. Glucometer as back-up. BG goal: 80 - 140 mg/dL.  Medications:   1) Semglee 23 units once daily at noon (No Tresiba while on daily steroids to be able to titrate daily if needed, Tresiba is ultra-long acting). If fasting BG < 80, reduce Semglee by 5 units. If fasting BG >140, increase Semglee by 2 units.   3) Humalog 4 units with meals plus correction scale 1 units per 50 >140 three times daily with meals and 1 units per 50 >200 HS  Pre-meal Humalog correction scale:   Do Not give Correction Insulin if Pre-Meal BG less than 140.    For Pre-Meal  - 189 give 1 unit.    For Pre-Meal  - 239 give 2 units.    For Pre-Meal  - 289 give 3 units.    For Pre-Meal  - 339 give 4 units.    For Pre-Meal - 399 give 5 units.    For Pre-Meal -449 give 6 units   For Pre-Meal BG greater than or equal to 450 give 7 units.    To be given with prandial insulin, and based on pre-meal blood glucose.      Bedtime Humalog correction scale:    Do Not give Bedtime Correction Insulin if BG less than  200.    For  - 249 give 1 units.    For  - 299 give 2 units.    For  - 349 give 3 units.    For  -399 give 4 units.    For BG greater than or equal to 400 give 5 units.   Notify provider if glucose greater than or equal  to 350 mg/dL after administration of correction dose.      Outpatient Follow-up:  An appointment request was sent to the [facility] coordinator to schedule your outpatient diabetes appointment 1-2 weeks from discharge. Please call the clinic at [phone] if you do not have an appointment scheduled on discharge, or if you have non-urgent questions regarding your blood sugars or insulin. Follow up sooner if blood glucose runs consistently greater than 180 mg/dL or any episodes less than 70 mg/dL.     If you have urgent questions or concerns regarding your blood sugars or insulin, you may contact [phone] (the main hospital ). Ask to speak with the endocrinologist on call.    Thank you for letting the Diabetes Management Team be involved in your care!   ineffective

## 2024-03-20 NOTE — PROGRESS NOTES
CLINICAL NUTRITION SERVICES - BRIEF NOTE     Nutrition Prescription    RECOMMENDATIONS FOR MDs/PROVIDERS TO ORDER:  Continue supplemental EN at this time until pt is able to meet ~60% est nutrition needs via PO intakes (per calorie counts, ~1405 kcal/d and 70g pro/d)     Recommendations already ordered by Registered Dietitian (RD):  Modify to send 2 strawberry Glucernas + 2 berry Magic Cups daily with meals    Continue calorie counts       EVALUATION OF THE PROGRESS TOWARD GOALS   Diet: 3 gm K+ with Glucerna, Magic Cup, Gelatein Plus  Nutrition Support: Novasource Renal @ 65 ml/hr x 12 hours (8p-8a)    Intake: Calorie counts:   3/17 - 303 kcal, 11 grams protein  3/18 - 443 kcal, 15 grams protein  3/19 - 1261 kcal, 47 grams protein     NEW FINDINGS   Pt likes Magic Cups and will drink Glucerna.      INTERVENTIONS  Provided education/encouragement re: increased PO intake.  Discussed goals for FT removal.  Provided post-transplant diet education to patient/SO.     Monitoring/Evaluation  Progress toward goals will be monitored and evaluated per protocol.     Fabi Bryan, MS, RD, LD, CCTD, CNSC  7A + 7B (beds 4499-1215)  Available on Vocera [7A or 7B Clinical Dietitian]   Weekend/Holiday: Vocera [Weekend Clinical Dietitian]

## 2024-03-20 NOTE — PLAN OF CARE
"/68 (BP Location: Right arm)   Pulse 94   Temp 98  F (36.7  C) (Oral)   Resp 16   Ht 1.73 m (5' 8.11\")   Wt 90.5 kg (199 lb 8 oz)   SpO2 98%   BMI 30.24 kg/m      Shift: 1154-0617  Isolation Status: Contact  VS: VSS on RA, afebrile  Neuro: Aox3. Intermittent confusion. Disoriented to time.  Behaviors: Calm, pleasant  BG: ACHS. 139, 114, 299.  Labs: LFTs downtrending  Respiratory: Diminished in bases  Cardiac: WDL  Pain/Nausea: Denies pain or nausea  PRN: Atarax x1  Diet: Regular  IV Access: R PIV SL  Infusion(s): Solu-medrol and micafungin  Lines/Drains: NJ for cycled TF. 8p-8a at 65ml/hr  GI/: Voiding without difficulty. BM x2 this shift.  Skin: Clamshell incision, stapled RON. Old TIMOTHY site covered with ostomy bag  Mobility: SBA  Events/Education: Diabetic education done.  Plan: TF education and pharmacy education tomorrow, 3/21.Possible discharge tomorrow, 3/21. Continue with plan of care and update provider of changes.  "

## 2024-03-20 NOTE — PROGRESS NOTES
Clinical Product Navigator RN reviewed chart; patient on payer product coverage.  Review results:   CPN Initial Information Gathering  Referral Source: Health Plan  Patient identified by Health Plan as a potential candidate for Primary Care Coordination. Per chart review patient is currently admitted to [facility] for a liver transplant on 3/5/2024. Per chart review patient's wife has been in frequent communication with SOT through [facility]'s Transplant clinic.       Not met any any referral criteria at this time.  Will monitor for future needs    NEAL Posada (she/her/hers)  Social Work Clinic Care Coordinator   [facility]  Casual ARASELI [email or web address]  [phone]

## 2024-03-20 NOTE — PROGRESS NOTES
Care Management Follow Up    Length of Stay (days): 15    Expected Discharge Date: 03/21/2024     Concerns to be Addressed: discharge planning     Patient plan of care discussed at interdisciplinary rounds: Yes    Anticipated Discharge Disposition: Home, Home Care, Home Infusion     Anticipated Discharge Services: None  Anticipated Discharge DME: None    Patient/family educated on Medicare website which has current facility and service quality ratings: no  Education Provided on the Discharge Plan: Yes  Patient/Family in Agreement with the Plan: yes    Referrals Placed by CM/SW: Internal Clinic Care Coordination, Homecare, Home Infusion  Private pay costs discussed: Not applicable    Additional Information:  Per care team rounds PT/OT recommending home with home care.  Pt will discharge on cycled enteral feeds. Met with pt and his wife.  Introduced RNCC role.  Discussed home care and home infusion services.  Pt would like home care RN, PT services.  Pt and spouse note no concerns regarding plan for discharge.       Initiated home infusion referral requesting home enteral benefit check.    Initiated home care referral via [facility].     RNCC will continue to monitor.    Ora Perry, RN BSN, PHN, ACM-RN  2/12/2024  Nurse Coordinator    Phone: [phone]    Social Work and Care Management Department     SEARCHABLE in [facility] - search CARE COORDINATOR     [place] ([phone]) Saturday & Sunday; ([phone]) [facility] Recognized Holidays     Units: 5A Onc Vocera & 5C Vocera Pager: [phone]    Units: 6B Vocera & 6C Vocera  Pager: [phone]    Units: 7A SOT RNCC Vocera, 7B Med Surg Vocera, & 7C Med Surg Vocera  Pager: [phone]    Units: 6A Vocera & 4A CVICU Vocera, 4C MICU Vocera, and 4E SICU Vocera   Pager: [phone]    Units: 5 Ortho Vocera & 5 Med Surg Vocera  Pager: [phone]    Units: 6 Med Surg Vocera & 8 Med Surg Vocera  Pager [phone]    3/20/2024

## 2024-03-21 ENCOUNTER — TELEPHONE (OUTPATIENT)
Dept: PHARMACY | Facility: CLINIC | Age: 54
End: 2024-03-21

## 2024-03-21 ENCOUNTER — APPOINTMENT (OUTPATIENT)
Dept: OCCUPATIONAL THERAPY | Facility: CLINIC | Age: 54
DRG: 005 | End: 2024-03-21
Attending: TRANSPLANT SURGERY
Payer: COMMERCIAL

## 2024-03-21 ENCOUNTER — TELEPHONE (OUTPATIENT)
Dept: TRANSPLANT | Facility: CLINIC | Age: 54
End: 2024-03-21

## 2024-03-21 ENCOUNTER — APPOINTMENT (OUTPATIENT)
Dept: PHYSICAL THERAPY | Facility: CLINIC | Age: 54
DRG: 005 | End: 2024-03-21
Attending: TRANSPLANT SURGERY
Payer: COMMERCIAL

## 2024-03-21 VITALS
OXYGEN SATURATION: 98 % | HEART RATE: 82 BPM | TEMPERATURE: 98.2 F | BODY MASS INDEX: 30.16 KG/M2 | HEIGHT: 68 IN | RESPIRATION RATE: 18 BRPM | SYSTOLIC BLOOD PRESSURE: 131 MMHG | WEIGHT: 199 LBS | DIASTOLIC BLOOD PRESSURE: 80 MMHG

## 2024-03-21 DIAGNOSIS — Z94.4 LIVER REPLACED BY TRANSPLANT (H): Primary | ICD-10-CM

## 2024-03-21 PROBLEM — E83.42 HYPOMAGNESEMIA: Status: ACTIVE | Noted: 2024-03-21

## 2024-03-21 PROBLEM — E87.70 HYPERVOLEMIA: Status: ACTIVE | Noted: 2024-03-21

## 2024-03-21 PROBLEM — D62 ANEMIA DUE TO BLOOD LOSS, ACUTE: Status: ACTIVE | Noted: 2024-03-21

## 2024-03-21 LAB
ALBUMIN SERPL BCG-MCNC: 3.1 G/DL (ref 3.5–5.2)
ALP SERPL-CCNC: 334 U/L (ref 40–150)
ALT SERPL W P-5'-P-CCNC: 81 U/L (ref 0–70)
ANION GAP SERPL CALCULATED.3IONS-SCNC: 12 MMOL/L (ref 7–15)
AST SERPL W P-5'-P-CCNC: 47 U/L (ref 0–45)
BILIRUB SERPL-MCNC: 1.1 MG/DL
BUN SERPL-MCNC: 54.8 MG/DL (ref 6–20)
CALCIUM SERPL-MCNC: 9.7 MG/DL (ref 8.6–10)
CHLORIDE SERPL-SCNC: 101 MMOL/L (ref 98–107)
CREAT SERPL-MCNC: 1.17 MG/DL (ref 0.67–1.17)
DEPRECATED HCO3 PLAS-SCNC: 21 MMOL/L (ref 22–29)
EGFRCR SERPLBLD CKD-EPI 2021: 75 ML/MIN/1.73M2
ERYTHROCYTE [DISTWIDTH] IN BLOOD BY AUTOMATED COUNT: 19.2 % (ref 10–15)
GLUCOSE BLDC GLUCOMTR-MCNC: 114 MG/DL (ref 70–99)
GLUCOSE BLDC GLUCOMTR-MCNC: 156 MG/DL (ref 70–99)
GLUCOSE BLDC GLUCOMTR-MCNC: 173 MG/DL (ref 70–99)
GLUCOSE SERPL-MCNC: 139 MG/DL (ref 70–99)
HCT VFR BLD AUTO: 31.8 % (ref 40–53)
HGB BLD-MCNC: 10.5 G/DL (ref 13.3–17.7)
MAGNESIUM SERPL-MCNC: 1.8 MG/DL (ref 1.7–2.3)
MCH RBC QN AUTO: 31.3 PG (ref 26.5–33)
MCHC RBC AUTO-ENTMCNC: 33 G/DL (ref 31.5–36.5)
MCV RBC AUTO: 95 FL (ref 78–100)
PATH REPORT.ADDENDUM SPEC: NORMAL
PATH REPORT.ADDENDUM SPEC: NORMAL
PATH REPORT.COMMENTS IMP SPEC: NORMAL
PATH REPORT.FINAL DX SPEC: NORMAL
PATH REPORT.GROSS SPEC: NORMAL
PATH REPORT.MICROSCOPIC SPEC OTHER STN: NORMAL
PATH REPORT.RELEVANT HX SPEC: NORMAL
PHOSPHATE SERPL-MCNC: 4.1 MG/DL (ref 2.5–4.5)
PHOTO IMAGE: NORMAL
PLATELET # BLD AUTO: 457 10E3/UL (ref 150–450)
POTASSIUM SERPL-SCNC: 4.5 MMOL/L (ref 3.4–5.3)
PROT SERPL-MCNC: 5.7 G/DL (ref 6.4–8.3)
RBC # BLD AUTO: 3.36 10E6/UL (ref 4.4–5.9)
SODIUM SERPL-SCNC: 134 MMOL/L (ref 135–145)
TACROLIMUS BLD-MCNC: 6.9 UG/L (ref 5–15)
TME LAST DOSE: NORMAL H
TME LAST DOSE: NORMAL H
WBC # BLD AUTO: 8.3 10E3/UL (ref 4–11)

## 2024-03-21 PROCEDURE — 250N000013 HC RX MED GY IP 250 OP 250 PS 637: Performed by: NURSE PRACTITIONER

## 2024-03-21 PROCEDURE — 85027 COMPLETE CBC AUTOMATED: CPT | Performed by: NURSE PRACTITIONER

## 2024-03-21 PROCEDURE — 250N000011 HC RX IP 250 OP 636: Mod: JZ | Performed by: NURSE PRACTITIONER

## 2024-03-21 PROCEDURE — 97535 SELF CARE MNGMENT TRAINING: CPT | Mod: GO

## 2024-03-21 PROCEDURE — 250N000013 HC RX MED GY IP 250 OP 250 PS 637: Performed by: PHYSICIAN ASSISTANT

## 2024-03-21 PROCEDURE — 97530 THERAPEUTIC ACTIVITIES: CPT | Mod: GP

## 2024-03-21 PROCEDURE — 83735 ASSAY OF MAGNESIUM: CPT | Performed by: NURSE PRACTITIONER

## 2024-03-21 PROCEDURE — 80053 COMPREHEN METABOLIC PANEL: CPT | Performed by: NURSE PRACTITIONER

## 2024-03-21 PROCEDURE — 250N000011 HC RX IP 250 OP 636: Performed by: PHYSICIAN ASSISTANT

## 2024-03-21 PROCEDURE — 250N000012 HC RX MED GY IP 250 OP 636 PS 637: Performed by: NURSE PRACTITIONER

## 2024-03-21 PROCEDURE — 84100 ASSAY OF PHOSPHORUS: CPT | Performed by: NURSE PRACTITIONER

## 2024-03-21 PROCEDURE — 258N000003 HC RX IP 258 OP 636: Performed by: NURSE PRACTITIONER

## 2024-03-21 PROCEDURE — 80197 ASSAY OF TACROLIMUS: CPT | Performed by: NURSE PRACTITIONER

## 2024-03-21 PROCEDURE — 250N000012 HC RX MED GY IP 250 OP 636 PS 637: Performed by: PHYSICIAN ASSISTANT

## 2024-03-21 PROCEDURE — 99233 SBSQ HOSP IP/OBS HIGH 50: CPT | Mod: 24 | Performed by: STUDENT IN AN ORGANIZED HEALTH CARE EDUCATION/TRAINING PROGRAM

## 2024-03-21 PROCEDURE — 250N000013 HC RX MED GY IP 250 OP 250 PS 637: Performed by: TRANSPLANT SURGERY

## 2024-03-21 PROCEDURE — 36415 COLL VENOUS BLD VENIPUNCTURE: CPT | Performed by: NURSE PRACTITIONER

## 2024-03-21 RX ORDER — PREDNISONE 20 MG/1
TABLET ORAL
Qty: 20 TABLET | Refills: 0 | Status: SHIPPED | OUTPATIENT
Start: 2024-03-21 | End: 2024-03-21

## 2024-03-21 RX ORDER — ACYCLOVIR 400 MG/1
TABLET ORAL
Qty: 3 EACH | Refills: 5 | Status: SHIPPED | OUTPATIENT
Start: 2024-03-21 | End: 2024-09-04

## 2024-03-21 RX ORDER — QUETIAPINE FUMARATE 25 MG/1
25 TABLET, FILM COATED ORAL AT BEDTIME
Qty: 30 TABLET | Refills: 0 | Status: ON HOLD | OUTPATIENT
Start: 2024-03-21 | End: 2024-04-02

## 2024-03-21 RX ORDER — ACETAMINOPHEN 500 MG
500-1000 TABLET ORAL EVERY 6 HOURS PRN
Qty: 30 TABLET | Refills: 0 | Status: ON HOLD | OUTPATIENT
Start: 2024-03-21 | End: 2024-06-23

## 2024-03-21 RX ORDER — MAGNESIUM OXIDE 400 MG/1
800 TABLET ORAL 2 TIMES DAILY
Qty: 120 TABLET | Refills: 5 | Status: SHIPPED | OUTPATIENT
Start: 2024-03-21 | End: 2024-03-28

## 2024-03-21 RX ORDER — PREDNISONE 20 MG/1
TABLET ORAL
Qty: 7 TABLET | Refills: 0 | Status: SHIPPED | OUTPATIENT
Start: 2024-03-22 | End: 2024-03-27

## 2024-03-21 RX ORDER — MYCOPHENOLATE MOFETIL 250 MG/1
750 CAPSULE ORAL 2 TIMES DAILY
Qty: 180 CAPSULE | Refills: 11 | Status: SHIPPED | OUTPATIENT
Start: 2024-03-21 | End: 2024-04-11 | Stop reason: SINTOL

## 2024-03-21 RX ORDER — KETOCONAZOLE 200 MG/1
100 TABLET ORAL DAILY
Qty: 30 TABLET | Refills: 2 | Status: ON HOLD | OUTPATIENT
Start: 2024-03-22 | End: 2024-04-02

## 2024-03-21 RX ORDER — AMOXICILLIN 250 MG
1-2 CAPSULE ORAL 2 TIMES DAILY PRN
Qty: 60 TABLET | Refills: 1 | Status: SHIPPED | OUTPATIENT
Start: 2024-03-21 | End: 2024-04-18

## 2024-03-21 RX ORDER — INSULIN GLARGINE-YFGN 100 [IU]/ML
23 INJECTION, SOLUTION SUBCUTANEOUS DAILY
Qty: 10 ML | Refills: 2 | Status: ON HOLD | OUTPATIENT
Start: 2024-03-21 | End: 2024-04-02

## 2024-03-21 RX ORDER — POLYETHYLENE GLYCOL 3350 17 G/17G
17 POWDER, FOR SOLUTION ORAL DAILY
Qty: 510 G | Refills: 1 | Status: SHIPPED | OUTPATIENT
Start: 2024-03-21 | End: 2024-04-18

## 2024-03-21 RX ORDER — ASPIRIN 325 MG
325 TABLET ORAL DAILY
Qty: 30 TABLET | Refills: 5 | Status: SHIPPED | OUTPATIENT
Start: 2024-03-22 | End: 2024-05-15

## 2024-03-21 RX ORDER — HYDROXYZINE HYDROCHLORIDE 25 MG/1
25 TABLET, FILM COATED ORAL EVERY 6 HOURS PRN
Qty: 20 TABLET | Refills: 0 | Status: ON HOLD | OUTPATIENT
Start: 2024-03-21 | End: 2024-04-02

## 2024-03-21 RX ORDER — URSODIOL 300 MG/1
300 CAPSULE ORAL 2 TIMES DAILY
Qty: 60 CAPSULE | Refills: 5 | Status: SHIPPED | OUTPATIENT
Start: 2024-03-21 | End: 2024-05-15

## 2024-03-21 RX ORDER — VALGANCICLOVIR 450 MG/1
450 TABLET, FILM COATED ORAL EVERY EVENING
Qty: 30 TABLET | Refills: 2 | Status: SHIPPED | OUTPATIENT
Start: 2024-03-21 | End: 2024-05-15

## 2024-03-21 RX ORDER — TACROLIMUS 1 MG/1
6 CAPSULE ORAL 2 TIMES DAILY
Qty: 360 CAPSULE | Refills: 11 | Status: SHIPPED | OUTPATIENT
Start: 2024-03-21 | End: 2024-03-25

## 2024-03-21 RX ORDER — SULFAMETHOXAZOLE AND TRIMETHOPRIM 400; 80 MG/1; MG/1
1 TABLET ORAL EVERY EVENING
Qty: 30 TABLET | Refills: 5 | Status: SHIPPED | OUTPATIENT
Start: 2024-03-21 | End: 2024-05-15

## 2024-03-21 RX ADMIN — MYCOPHENOLATE MOFETIL 750 MG: 250 CAPSULE ORAL at 08:36

## 2024-03-21 RX ADMIN — ASPIRIN 325 MG ORAL TABLET 325 MG: 325 PILL ORAL at 08:36

## 2024-03-21 RX ADMIN — TACROLIMUS 6 MG: 5 CAPSULE ORAL at 08:35

## 2024-03-21 RX ADMIN — Medication 100 MG: at 08:37

## 2024-03-21 RX ADMIN — URSODIOL 300 MG: 300 CAPSULE ORAL at 08:36

## 2024-03-21 RX ADMIN — MAGNESIUM OXIDE TAB 400 MG (241.3 MG ELEMENTAL MG) 800 MG: 400 (241.3 MG) TAB at 12:05

## 2024-03-21 RX ADMIN — MICAFUNGIN SODIUM 100 MG: 50 INJECTION, POWDER, LYOPHILIZED, FOR SOLUTION INTRAVENOUS at 10:58

## 2024-03-21 RX ADMIN — THIAMINE HCL TAB 100 MG 100 MG: 100 TAB at 08:36

## 2024-03-21 RX ADMIN — Medication 1 TABLET: at 08:36

## 2024-03-21 RX ADMIN — INSULIN ASPART 2 UNITS: 100 INJECTION, SOLUTION INTRAVENOUS; SUBCUTANEOUS at 12:05

## 2024-03-21 RX ADMIN — PANTOPRAZOLE SODIUM 40 MG: 40 TABLET, DELAYED RELEASE ORAL at 08:37

## 2024-03-21 RX ADMIN — METHYLPREDNISOLONE SODIUM SUCCINATE 118.75 MG: 125 INJECTION, POWDER, FOR SOLUTION INTRAMUSCULAR; INTRAVENOUS at 10:37

## 2024-03-21 ASSESSMENT — ACTIVITIES OF DAILY LIVING (ADL)
ADLS_ACUITY_SCORE: 30
ADLS_ACUITY_SCORE: 27
ADLS_ACUITY_SCORE: 30
ADLS_ACUITY_SCORE: 27
ADLS_ACUITY_SCORE: 30
ADLS_ACUITY_SCORE: 27

## 2024-03-21 NOTE — PLAN OF CARE
Physical Therapy Discharge Summary    Reason for therapy discharge:    Discharged to home with home therapy.    Progress towards therapy goal(s). See goals on Care Plan in [facility] electronic health record for goal details.  Goals partially met.  Barriers to achieving goals:   discharge from facility.    Therapy recommendation(s):    Continued therapy is recommended.  Rationale/Recommendations:   PT recommended to promote strength and activity tolerance.

## 2024-03-21 NOTE — PROGRESS NOTES
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 Insulin drip since operation with high needs on steroid taper and continuous tube feeds. Inpatient diabetes management service consulted on 3/13/24 for assistance in management.           Assessment/Plan:     Assessment:   Insulin dependent  Diabetes Mellitus, without known complications, sub optimal control based on hyperglycemia  (A1c 4.8% (2/25/2024)  - A1c inaccurte in setting of anemia and RBC transfusions the day prior.)  ETOH cirrhosis s/p liver transplant  Tube feed related hyperglycemia  Stress related  hyperglycemia      Plan/Recommendations:    - Decrease Lantus from 28 to 23 units q 24 hrs at 1200.  - Discontinue NPH with tube feeding --> cyclic TF discontinuing.   - Novolog Meal Coverage: 1 units per 10 g CHO, TID AC and PRN with snacks/supplements   - Novolog Correction Scale: 1:25 >140 TID AC; and 1:25 >140 2200 & 0200 (higher overnight while on tube feeding - no longer needed at discharge)  - BG monitoring: TID AC, HS, 0200  - Hypoglycemia protocol    - Carb counting protocol   - Test Claims: See SSimone Heard note 3/14- Humulin N kwikpens covered at $0. Test claim added 3/20 - basal insulin (not tresiba) and glucagon - patient's wife requesting glucagon at discharge.   - Education:  None while inpatient. Per wife, follows closely outpatient with diabetes educator, Liana Garcia at [facility].      Discussion: Hyperglycemia following lunch yesterday albeit good BG following breakfast. Steroid tapering again today (Methylpred 162.5 yesterday --> 118 mg today). Patient discharging home today. Per dietician, discontinuing cyclic TF today for discharge. Will relax Lantus (Semglee) for discharge. Discharge plan reviewed  "with patient and patient's wife, all questions answered.     Steroid plan for discharge per primary team:  3/22: Methylprednisolone 80 mg once daily at 0800  3/23: Methylprednisolone 40 mg once daily at 0800  3/24: Methylprednisolone 20 mg once daily at 0800  3/25: No steroids     Diabetes Management Team Discharge Recommendations:   Instructions to patient were posted in AVS and discussed on day of discharge. Medications and supplies are to be ordered by primary service on discharge. *please use the [employer] non-branded discharge supply order set ([id number])*   Supplies needed at discharge: Dexcom G7 sensors, Glucagon, Semglee (YFGN) pens, Humalog pens, \"BD\" (32G x 4mm) insulin pen needles.   BG Monitoring: three times daily before meals and at bedtime. Ok to use Dexcom G7. Glucometer as back-up. BG goal: 80 - 140 mg/dL.  Medications:   1) Semglee 23 units once daily at noon (No Tresiba while on daily steroids to be able to titrate daily if needed, Tresiba is ultra-long acting). If fasting BG <80, reduce Semglee by 5 units. If fasting BG >140, increase Semglee by 2 units.   3) Humalog 4 units with meals plus correction scale 1:50 >140 TID AC and 1:50 >200 HS  Pre-meal Humalog correction scale:   Do Not give Correction Insulin if Pre-Meal BG less than 140.    For Pre-Meal  - 189 give 1 unit.    For Pre-Meal  - 239 give 2 units.    For Pre-Meal  - 289 give 3 units.    For Pre-Meal  - 339 give 4 units.    For Pre-Meal - 399 give 5 units.    For Pre-Meal -449 give 6 units   For Pre-Meal BG greater than or equal to 450 give 7 units.    To be given with prandial insulin, and based on pre-meal blood glucose.      Bedtime Humalog correction scale:    Do Not give Bedtime Correction Insulin if BG less than  200.    For  - 249 give 1 units.    For  - 299 give 2 units.    For  - 349 give 3 units.    For  -399 give 4 units.    For BG greater than or equal to 400 give 5 " units.   Notify provider if glucose greater than or equal to 350 mg/dL after administration of correction dose.      Outpatient Follow-up:  An appointment request was sent to the [facility] coordinator to schedule your outpatient diabetes appointment 1-2 weeks from discharge. Please call the clinic at [phone] if you do not have an appointment scheduled on discharge, or if you have non-urgent questions regarding your blood sugars or insulin. Follow up sooner if blood glucose runs consistently greater than 180 mg/dL or any episodes less than 70 mg/dL.       Please notify Inpatient Diabetes Service if changes are planned to steroids, nutrition, TPN/TF and anticipated procedures requiring prolonged NPO status.           Interval History/Review of Systems :   - The last 24 hours progress and nursing notes reviewed.  - Overall feels well today, excited to discharge home.   - TF discontinuing today.     Planned Procedures/Surgeries: none    Inpatient Glucose Control:       Recent Labs   Lab 03/21/24  0504 03/21/24  0157 03/20/24  2141 03/20/24  1735 03/20/24  1155 03/20/24  0753   * 156* 216* 299* 114* 139*             Medications Impacting Glycemia:   Steroids:   3/11 - 3/16: prednisone 5mg daily   3/16: Methylprednisolone 500 mg once at 1300  3/17: Methylprednisolone 500 mg once at 1000  3/18: Methylprednisolone 500 mg once at 0900  3/19: Methylprednisolone 200 mg once at 0800   3/20: Methylprednisolone 162.5 mg once at 0800  3/21: Methylprednisolone 118.75 mg once at 0800  steroid taper plan per primary team: Methylprednisolone 80 mg tomorrow, followed by 40 mg, 20 mg then none.    D5W containing solutions/medications: none  Other medications impacting glucose:  anti-rejection medications         Nutrition:   Orders Placed This Encounter      Regular Diet Adult  Supplements:  Glucerna at 1400   TF: Novasource Renal @ 50 mL/hr cycled 8pm-8am - 110 g CHO (changed from Two Alli HN 3/17) -->  "increased 3/18  to [facility] Renal @ 65 mL/hr cycled 8pm-8am - 143g CHO. Discontinued 3/21.   TPN: none         Diabetes History: see full consult note for complete diabetes history   Diabetes Type and Duration:   T2DM diagnosed 10/31/2023 with A1c 8.1%.  GAD65 negative 11/3/2023, IA-2 negative 11/3/2023, C -peptide normal (3.7) 10/31/2023.      Diabetes Medications:                   1) Tresiba 36 units once daily    2) Humalog 15 units with breakfast and lunch, 13 units with dinner plus correction scale 1 unit per 35 >175 mg/dL and 1 unit per 35 >210 at bedtime.   Historical Diabetes Medications: none, reports outpatient diabetes educator was looking into an insulin pump.     BG monitoring device & frequency:  Dexcom G7, also uses glucometer, sometimes has issues with android and Dexcom G7.   Outpatient Diabetes Provider:  [facility], Suzanne Todd (ALLY). Last visit 11/28/2023  Formal Diabetes Education/Educator: Linaa Garcia. Last visit 2/23/2024.         Physical Exam:   /52 (BP Location: Right arm, Patient Position: Semi-Bob's, Cuff Size: Adult Large)   Pulse 96   Temp 98.5  F (36.9  C) (Oral)   Resp 18   Ht 1.73 m (5' 8.11\")   Wt 90.5 kg (199 lb 8 oz)   SpO2 97%   BMI 30.24 kg/m    General Appearance:  No acute distress, sitting up in chair. Tube feed off. Wife at bedside.   HEENT: Normocephalic, atraumatic. Anicteric, conjunctiva clear.  Lungs:  Breathing comfortably on room air.   Skin:  Warm and dry.   Neuro: A&Ox3  Psych:  Calm, appropriate mood and affect.         Data:     Lab Results   Component Value Date    A1C 4.8 02/25/2024    A1C 7.7 12/19/2023    A1C 8.1 10/31/2023    A1C 5.1 08/23/2023    A1C 5.7 03/12/2022     BMP  Recent Labs   Lab 03/21/24  0504 03/21/24  0157 03/20/24  2141 03/20/24  1735 03/20/24  0621 03/20/24  0436 03/19/24  0508 03/19/24  0438 03/18/24  0904 03/18/24  0541   *  --   --   --   --  135  --  136  --  133*   POTASSIUM 4.5  --   --   --   -- "  4.0  --  4.4  --  4.8   CHLORIDE 101  --   --   --   --  99  --  99  --  96*   MEG 9.7  --   --   --   --  9.4  --  8.9  --  9.1   CO2 21*  --   --   --   --  25  --  24  --  25   BUN 54.8*  --   --   --   --  59.6*  --  61.8*  --  55.2*   CR 1.17  --   --   --   --  1.29*  --  1.46*  --  1.49*   * 156* 216* 299*   < > 177*   < > 153*   < > 237*    < > = values in this interval not displayed.     CBC  Recent Labs   Lab 03/21/24  0504 03/20/24  0436 03/19/24  0438 03/18/24  0541   WBC 8.3 8.2 11.9* 14.6*   RBC 3.36* 3.23* 3.16* 3.22*   HGB 10.5* 10.3* 9.7* 10.0*   HCT 31.8* 31.1* 29.9* 31.9*   MCV 95 96 95 99   MCH 31.3 31.9 30.7 31.1   MCHC 33.0 33.1 32.4 31.3*   RDW 19.2* 19.0* 19.2* 19.9*   * [phone]     Inpatient Diabetes Service will continue to follow, please don't hesitate to contact the team with any questions or concerns.     Luba Dennis PA-C  Inpatient Diabetes Service  Pager: [phone]  Available on vocera    Plan discussed with patient, bedside RN, and primary team via this note.    To contact Inpatient Diabetes Service:     7 AM - 5 PM: Page the IDS JOSELYN following the patient that day (see filed or incomplete progress notes/consult notes under Endocrinology)    OR if uncertain of provider assignment: page job code [phone]    5 PM - 7 AM: First call after hours is to primary service.    For urgent after-hours questions, page job code for on call fellow: [phone]     I spent a total of  50 minutes on the date of the encounter doing prep/post-work, chart review, history and exam, documentation and further activities per the note including lab review, multidisciplinary communication, counseling the patient and/or coordinating care regarding acute hyper/hypoglycemic management, as well as discharge management and planning/communication.  See note for details.

## 2024-03-21 NOTE — PROGRESS NOTES
Care Management Discharge Note    Discharge Date: 03/21/2024       Discharge Disposition: Home, Home Care, Home Infusion    Discharge Services: None    Discharge DME: None    Discharge Transportation: family or friend will provide    Private pay costs discussed: Not applicable    Does the patient's insurance plan have a 3 day qualifying hospital stay waiver?  No    PAS Confirmation Code:    Patient/family educated on Medicare website which has current facility and service quality ratings: no    Education Provided on the Discharge Plan: Yes  Persons Notified of Discharge Plans: Patient and spouse  Patient/Family in Agreement with the Plan: yes    Handoff Referral Completed: Yes    Additional Information:  Pt medically cleared for discharge today.  Team confirmed pt will not require enteral feeds at discharge.  Plan for home care RN, PT, OT services.  Updated [facility], canceled home enteral referral.  Received call from [facility] that agency is able to accept referral for home RN, Pt, OT services.      Met with pt and spouse.  Reviewed anticipated plan for discharge.  Reviewed that [facility] has accepted patient for home RN, PT and OT services.  Pt and spouse scheduled for pharmacy education today.  Pt and spouse note no concerns or questions regarding anticipated plan for discharge.    Accepting Home Care  [facility]   [phone]   Fax [phone]  RN, PT, OT      Ora Perry, RN BSN, PHN, ACM-RN  2/12/2024  Nurse Coordinator    Phone: [phone]    Social Work and Care Management Department     SEARCHABLE in [facility] - search CARE COORDINATOR     [place] [employer] [place] ([phone]) Saturday & Sunday; ([phone]) [facility] Recognized Holidays     Units: 5A Onc Vocera & 5C Vocera Pager: [phone]    Units: 6B Vocera & 6C Vocera  Pager: [phone]    Units: 7A SOT RNCC Vocera, 7B Med Surg Vocera, & 7C Med Surg Vocera  Pager: [phone]    Units: 6A Vocera & 4A CVICU Vocera, 4C MICU  Vocera, and 4E SICU Vocera   Pager: [phone]    Units: 5 Ortho Vocera & 5 Med Surg Vocera  Pager: [phone]    Units: 6 Med Surg Vocera & 8 Med Surg Vocera  Pager [phone]    3/21/2024

## 2024-03-21 NOTE — PROGRESS NOTES
Ostomy bag removed, IV removed, discharge instructions given, pt taken down to lobby via wheelchair, discharge medications picked up at discharge pharmacy, pt going home with wife

## 2024-03-21 NOTE — PROGRESS NOTES
CLINICAL NUTRITION SERVICES - Brief NOTE     Nutrition Prescription    RECOMMENDATIONS FOR MDs/PROVIDERS TO ORDER:  Recommend discontinue FT/TF    Recommendations already ordered by Registered Dietitian (RD):  Discontinue TF/Prosource orders    Future/Additional Recommendations:  -- monitor oral intake of meals and supplements      EVALUATION OF THE PROGRESS TOWARD GOALS   Diet: Regular with Glucerna BID and magic cup BID   Nutrition Support: Novasource Renal @ 65 ml/hr x 12 hours + 1 pkt Prosource TF daily -> 1640 kcal (22 kcal/kg), 91 grams protein (1.2g/kg), 143g CHO, 559 ml free water   Intake: Mary and family reported he also took 2 Glucerna, 1 magic cup, medium hot chocolate with whip cream, 1/3 taco salad from [email or web address] (spring lettuce, ground beef, guacamole, black olives, crema) and 25% club sandwich from [email or web address]     Mary reported his appetite is still good today. He has had 2 grape juice and 50% of omelet from AM meal. He reported he would be able to drink 2 Glucerna today.     Calorie count update for 3/20: 2163 calories and 98 gram protein     NEW FINDINGS   Labs (3/21): Na 134 mmol/L (L), BUN 54.8 mg/dL (H), Cr 1.17 mg/dL    INTERVENTIONS  Implementation  Collaboration with other providers - discussed FEN/GI with team with recommendations to discontinue FT/TF. Discussed with endocrine  Enteral Nutrition - Discontinue orders    Monitoring/Evaluation  Progress toward goals will be monitored and evaluated per protocol.    Dena Blancas MS/RD/LD/CNSC  Available on [employer]   7A/7B (beds 219-229) RD

## 2024-03-21 NOTE — PROGRESS NOTES
Calorie Count  Intake recorded for: 3/20  Total Kcals: 708 Total Protein: 29g  Kcals from Hospital Food: 708   Protein: 29g  Kcals from Outside Food (average):0 Protein: 0g  # Meals Ordered from Kitchen: 1 order  # Meals Recorded: 1 record (100% taco with sausage, egg, cheese, 75% 2 grape juice, 50% tater tots)  # Supplements Recorded: no intake recorded

## 2024-03-21 NOTE — TELEPHONE ENCOUNTER
A pharmacist spent 45 minutes providing medication teaching with Mary Lopez and Rosio (spouse) for discharge with a focus on new medications/dose changes.  The discharge medication list was reviewed with the patient/family and the following points were discussed, as applicable: Name, description, purpose, dose/strength, duration of medications, common side effects, food/medications to avoid, action to be taken if dose is missed, and how to obtain refills.  The spouse (Rosio) will be responsible for managing medications. Additionally, the following transplant related education was covered: Purpose of medication card, Medication videos, Timing of medications and day of lab draw considerations , Prescription Insurance , and Discharge process for receiving meds   Patient will  transplant supplies including 7 day pill organizer, thermometer, and BP monitor at the discharge pharmacy along with medications.  Patient chooses to receive medications from [facility] pharmacy.   Clinical Pharmacy Consult:                                                      Transplant Specific:   Date of Transplant: 3/5/24  Type of Transplant: liver  First Transplant: yes  History of rejection: no    Immunosuppression Regimen   TAC 6mg qAM & 6mg qPM, Prednisone taper, and MMF 750mg qAM & 750mg qPM  Patient specific goal: 8-10  Most recent level: 6.9, date 3/21  Immunosuppressant Levels:  Subtherapeutic (on ketoconazole to boost tac level)  Pt adherent to lab draws: yes  Scr:   Lab Results   Component Value Date    CR 1.17 03/21/2024    CR 0.95 07/05/2020     Side effects: Tremors and Nausea    Prophylactic Medications  PJP Prophylactic: Bactrim SS daily  Scheduled Discontinue Date: 6 months Anticipated date: 9/7/24    Antifungal: Not needed thus far  Scheduled Discontinue Date: N/A     CMV Prophylactic: Max dose in Liver transplant is Valcyte 450mg once daily   Scheduled Discontinue Date: 3 months Anticipated date:  6/13/24    Acid Reducer: Protonix (pantoprazole)  Scheduled Reviewed Date:  on Prilosec PTA    Vascular Prophylactic: Aspirin 325 mg PO daily  Scheduled Discontinue Date:  TBD    Med rec/DUR performed: yes  Med Rec Discrepancies: no    Reminders:    Bring to first clinic appt: med box, med card, bp monitor, all medications being taken, and lab book.  2.   MTM pharmacist visit on first clinic appt and if ok, again in 3 to 4 months during follow up appt.  3.   Avoid Grapefruit and Grapefruit juice.   4.   Avoid herbal supplements. If wish to take other medications or supplements, call your coordinator.   5.   Keep lab appts.   6.   Can use apps on phone like [email or web address] to help manage medication lists and reminders.   7.   Make sure you are protecting your skin by wearing long sleeves and applying sunscreen to exposed skin, for any significant time in the sun.     Transplant Coordinator is Lore Boyd [facility]

## 2024-03-21 NOTE — PROGRESS NOTES
CLINICAL NUTRITION SERVICES - DISCHARGE NOTE    Patient s discharge needs assessed and discharge planning has been conducted with the multidisciplinary transplant care team including physicians, pharmacy, social work and transplant coordinator.    Follow up/Monitoring:  Once discharged, place outpatient nutrition consult via the transplant team if nutrition concerns arise.    Dena Blancas MS/RD/LD/CNSC  Available on [employer]   7A/7B (beds 219-229) RD

## 2024-03-21 NOTE — PLAN OF CARE
"/52 (BP Location: Right arm, Patient Position: Semi-Bob's, Cuff Size: Adult Large)   Pulse 96   Temp 98.5  F (36.9  C) (Oral)   Resp 18   Ht 1.73 m (5' 8.11\")   Wt 90.5 kg (199 lb 8 oz)   SpO2 97%   BMI 30.24 kg/m      Shift: 1329-8962  VS: VSS, afebrile  Neuro: Aox4  Behaviors: calm and cooperative   BG: Q4H (ACHS during the day); 216, 156  Respiratory: RA  Pain/Nausea: Tylenol and atarax given for pain. Denies nausea.   Diet: Regular   IV Access: R PIV SL   GI/: 800 mL shift output. No BM.   Skin: Clamshell incision stapled and RON.   Mobility: SBA + GB & W   Events/Education: Specialty pharm and TF education 3/21  Plan: Continue with POC and notify team of any changes.    "

## 2024-03-21 NOTE — PLAN OF CARE
"/80 (BP Location: Right arm)   Pulse 82   Temp 98.2  F (36.8  C) (Oral)   Resp 18   Ht 1.73 m (5' 8.11\")   Wt 90.3 kg (199 lb)   SpO2 98%   BMI 30.16 kg/m      Shift: 1151-6438  VS: Stable on RA, afebrile.  Neuro: Patient has intermittent confusion.  BG: ACHS with sliding scale insulin and carb coverage, daily long acting insulin.  Labs: WDL  Respiratory: WDL  Pain/Nausea/PRN: Denies pain this shift.  Diet: Regular diet, fair appetite.  LDA: R PIV - to be removed prior to discharge. Ostomy bag over leaking old TIMOTHY site to be removed prior to discharge, leaking resolved.  GI/: UAL to bathroom.  Skin: Clamshell incision with staples, RON, no concerns.   Mobility: UAL  Plan: Patient to discharge home when pharmacy has finished preparing meds. AVS reviewed with patient and wife at bedside, all questions answered.     Handoff given to following RN.    "

## 2024-03-22 ENCOUNTER — TELEPHONE (OUTPATIENT)
Dept: FAMILY MEDICINE | Facility: CLINIC | Age: 54
End: 2024-03-22

## 2024-03-22 ENCOUNTER — VIRTUAL VISIT (OUTPATIENT)
Dept: ENDOCRINOLOGY | Facility: CLINIC | Age: 54
End: 2024-03-22
Payer: COMMERCIAL

## 2024-03-22 ENCOUNTER — TELEPHONE (OUTPATIENT)
Dept: PHARMACY | Facility: CLINIC | Age: 54
End: 2024-03-22

## 2024-03-22 ENCOUNTER — TELEPHONE (OUTPATIENT)
Dept: ENDOCRINOLOGY | Facility: CLINIC | Age: 54
End: 2024-03-22

## 2024-03-22 ENCOUNTER — MEDICAL CORRESPONDENCE (OUTPATIENT)
Dept: HEALTH INFORMATION MANAGEMENT | Facility: CLINIC | Age: 54
End: 2024-03-22

## 2024-03-22 DIAGNOSIS — E11.69 DIABETES MELLITUS ASSOCIATED WITH PANCREATIC DISEASE (H): Primary | ICD-10-CM

## 2024-03-22 DIAGNOSIS — K86.9 DIABETES MELLITUS ASSOCIATED WITH PANCREATIC DISEASE (H): Primary | ICD-10-CM

## 2024-03-22 PROCEDURE — 99496 TRANSJ CARE MGMT HIGH F2F 7D: CPT | Mod: 95 | Performed by: PHYSICIAN ASSISTANT

## 2024-03-22 NOTE — PROGRESS NOTES
Virtual Visit Details    Type of service:  Video Visit     Originating Location (pt. Location):     Distant Location (provider location):    Platform used for Video Visit:

## 2024-03-22 NOTE — TELEPHONE ENCOUNTER
Home Care is calling regarding an established patient with [facility].       Requesting orders from: Jimmie Hoyt  Provider is following patient: No       Orders Requested    Occupational Therapy  Request for initial evaluation and treatment (one time) (first set of orders)   Frequency:  one time for evaluation for cognitive testing and ADL training after liver transplant   pt discharged yesterday.       Information was gathered and will be sent to provider for review.  RN will contact Home Care with information after provider review.  Information was gathered and will be sent to provider to confirm provider will be following patient.  RN will contact Home Care with information after provider review.  Confirmed ok to leave a detailed message with call back.  Contact information confirmed and updated as needed.    Advised to call surgeon or transplant team for orders about wound. She will do that.   Vicki Eisenberg RN

## 2024-03-22 NOTE — LETTER
"3/22/2024       RE: Mary Lopez  [address] [place] [address]     Dear Colleague,    Thank you for referring your patient, Mary Lopez, to the [facility] [place] at [facility]. Please see a copy of my visit note below.    Outcome for 24 9:43 AM: Glucose readings are under \"labs\" from patients recent hospitalization.   Margoth Thompson LPN         Virtual Visit Details    Type of service:  Video Visit     Originating Location (pt. Location):     Distant Location (provider location):    Platform used for Video Visit:       Time of start: 2:30 pm   Time of end: 2:54 pm   Total duration of video visit: 24 minutes.  Providers location: Off-site.  Patient's location: [place].    ZACKERY Lopez is a 53 year-old male with history of type 2 diabetes mellitus.  Video visit today for diabetes follow-up following recent hospital discharge.  Patient recently admitted 3/4/2024-3/21/2024 and underwent liver transplant on 3/5/2024.  Patient was followed by the inpatient diabetes team during his hospitalization.  He was last seen by Suzanne Todd PA-C outpatient in 2023.  He has also been seen by Liana Garcia RD/JESUSE.  As above, underwent  donor liver transplant on 3/5/2024.  Alk phos remains elevated which is being monitored closely by the transplant team.  Patient did require tube feedings during his hospitalization.  He is not receiving any tube feedings at this time.  He also has received steroids.  He is currently on a steroid taper taking 80 mg daily which will be decreased to 40 mg daily starting tomorrow.  Since his discharge from the hospital yesterday he reports feeling fine.  For his diabetes, patient is currently taking Semglee 23 units daily and Humalog 4 units with meals with correction ( 1 unit/50 for BG > 140 with meals and > 200 at hs ).  Recent A1C was 4.8 % on 2024.  Patient " anemic.  Patient was just discharged from the hospital yesterday.  I have very little blood sugar data since he has been home.  Fasting blood sugar 162 this am and blood sugar at noon today is 218.  No hypoglycemic.  On ROS today, no tube feedings at this time.   Patient on steroid taper.  Quit smoking.  Occasional chews tobacco.  Denies blurred vision, nausea, vomiting, shortness of breath at rest, fever or rigors.  Denies chest pain, abdominal pain, diarrhea, constipation, dysuria and also denies symptoms of diabetic neuropathy.  No foot ulcers.    Diabetes Care  Retinopathy: None per patient.  Nephropathy: Urine microalbuminuria was negative in March 2022.  Neuropathy: None.  Foot Exam: No exam today.  Taking aspirin: Yes.  Lipids:  in March 2022.  Insulin: Basal and mealtime insulin with correction.  DM meds: none.  Testing: Dexcom G7 sensor.  Hypoglycemia treatment: Has Gvoke pen.    ROS  See under HPI.    Allergies  Allergies   Allergen Reactions    Other Food Allergy Anaphylaxis     Legumes (black beans, baked beans, chickpeas)    Pineapple Itching       Medications  Current Outpatient Medications   Medication Sig Dispense Refill    acetaminophen (TYLENOL) 500 MG tablet Take 1-2 tablets (500-1,000 mg) by mouth every 6 hours as needed for mild pain 30 tablet 0    aspirin (ASA) 325 MG tablet 1 tablet (325 mg) by Oral or Feeding Tube route daily 30 tablet 5    blood glucose (NO BRAND SPECIFIED) test strip Use to test blood sugar two times daily or as directed. To accompany: Blood Glucose Monitor Brands: per insurance. 100 strip 6    blood glucose monitoring (NO BRAND SPECIFIED) meter device kit Use to test blood sugar twice times daily or as directed. Preferred blood glucose meter OR supplies to accompany: Blood Glucose Monitor Brands: per insurance. 1 kit 0    Continuous Blood Gluc Sensor (DEXCOM G7 SENSOR) MISC Change every 10 days. 3 each 5    Continuous Blood Gluc Sensor (DEXCOM G7 SENSOR) MISC Change  every 10 days. 3 each 5    Glucagon (GVOKE HYPOPEN) 1 MG/0.2ML pen Inject the contents of 1 device under the skin into lower abdomen, outer thigh, or outer upper arm as needed for hypoglycemia. If no response after 15 minutes, additional 1 mg dose from a new device may be injected while waiting for emergency assistance. 0.4 mL 1    hydrOXYzine HCl (ATARAX) 25 MG tablet 1 tablet (25 mg) by Oral or Feeding Tube route every 6 hours as needed for other (adjuvant pain) 20 tablet 0    insulin aspart (NOVOLOG PEN) 100 UNIT/ML pen Inject 1-10 Units Subcutaneous 3 times daily (before meals) Humalog 4 units with meals plus correction scale 1:50 >140 TID AC and 1:50 >200 HS Pre-meal Humalog correction scale: Do Not give Correction Insulin if Pre-Meal BG less than 140. For Pre-Meal  - 189 give 1 unit. For Pre-Meal  - 239 give 2 units. For Pre-Meal  - 289 give 3 units. For Pre-Meal  - 339 give 4 units. For Pre-Meal - 399 give 5 units. For Pre-Meal -449 give 6 units For Pre-Meal BG greater than or equal to 450 give 7 units. To be given with prandial insulin, and based on pre-meal blood glucose. Bedtime Humalog correction scale: Do Not give Bedtime Correction Insulin if BG less than 200. For  - 249 give 1 units. For  - 299 give 2 units. For  - 349 give 3 units. For  -399 give 4 units. For BG greater than or equal to 400 give 5 units. Notify provider if glucose greater than or equal to 350 mg/dL after administration of correction dose. 15 mL 1    Insulin Glargine-yfgn (SEMGLEE, YFGN,) 100 UNIT/ML SOLN Inject 23 Units Subcutaneous daily 10 mL 2    insulin pen needle (32G X 4 MM) 32G X 4 MM miscellaneous Use as directed by provider Per insurance coverage 200 each 1    insulin pen needle (BD PEN NEEDLE SHERRIE 2ND GEN) 32G X 4 MM miscellaneous USES 5 PER  each 11    ketoconazole (NIZORAL) 200 MG tablet Take 0.5 tablets (100 mg) by mouth daily 30 tablet 2    magnesium  oxide (MAG-OX) 400 MG tablet Take 2 tablets (800 mg) by mouth 2 times daily for 180 days 120 tablet 5    melatonin 10 MG TABS tablet Take 1 tablet (10 mg) by mouth nightly as needed for sleep      multivitamin w/minerals (THERA-VIT-M) tablet TAKE 1 TABLET BY MOUTH DAILY 90 tablet 1    mycophenolate (GENERIC EQUIVALENT) 250 MG capsule Take 3 capsules (750 mg) by mouth 2 times daily 180 capsule 11    nicotine (NICODERM CQ) 7 MG/24HR 24 hr patch Place 1 patch onto the skin daily as needed for nicotine withdrawal symptoms 7 patch 3    omeprazole (PRILOSEC) 20 MG DR capsule Take 1 capsule (20 mg) by mouth 2 times daily 180 capsule 1    polyethylene glycol (MIRALAX) 17 GM/Dose powder Take 17 g by mouth daily 510 g 1    predniSONE (DELTASONE) 20 MG tablet 3/22: 80 mg 3/23: 40 mg 3/24: 20 mg 7 tablet 0    QUEtiapine (SEROQUEL) 25 MG tablet Take 1 tablet (25 mg) by mouth at bedtime 30 tablet 0    senna-docusate (SENOKOT-S/PERICOLACE) 8.6-50 MG tablet 1-2 tablets by Oral or Feeding Tube route 2 times daily as needed for constipation 60 tablet 1    sulfamethoxazole-trimethoprim (BACTRIM) 400-80 MG tablet Take 1 tablet by mouth every evening 30 tablet 5    tacrolimus (GENERIC EQUIVALENT) 1 MG capsule Take 6 capsules (6 mg) by mouth 2 times daily 360 capsule 11    thin (NO BRAND SPECIFIED) lancets Use with lanceting device. To accompany: Blood Glucose Monitor Brands: per insurance. 100 each 6    ursodiol (ACTIGALL) 300 MG capsule Take 1 capsule (300 mg) by mouth 2 times daily 60 capsule 5    valGANciclovir (VALCYTE) 450 MG tablet Take 1 tablet (450 mg) by mouth every evening 30 tablet 2       Family History  family history includes Anxiety Disorder in his mother; Bipolar Disorder in his mother; Chronic Obstructive Pulmonary Disease in his mother; Depression in his mother; Diabetes in his father; Diabetes Type 2  in his brother; Lung Cancer (age of onset: 81) in his mother; Morbid Obesity in his father; Substance Abuse in his  maternal grandfather and paternal grandfather.    Social History   reports that he has quit smoking. His smoking use included cigarettes. He has never been exposed to tobacco smoke. His smokeless tobacco use includes chew. He reports that he does not currently use alcohol after a past usage of about 12.0 standard drinks of alcohol per week. He reports that he does not currently use drugs.     Past Medical History  Past Medical History:   Diagnosis Date    ANGEL (acute kidney injury) (H24)     Alcoholic cirrhosis of liver without ascites (H) 07/13/2023    Alcoholic hepatitis with ascites (H28) 10/03/2023    Alcoholic hepatitis without ascites (H28) 07/13/2023    Closed fracture of one rib of left side 09/14/2023    Concussion without loss of consciousness 03/11/2020    Decompensated hepatic cirrhosis (H) 09/15/2023    Diabetes mellitus, type 2 (H) 11/22/2023    Essential hypertension 03/11/2020    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)     Liver replaced by transplant (H) 03/05/2024    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    ACR PRAJAPATI 6-7/9, treated with steroids    Mild hyperlipidemia 12/07/2021    Persistent insomnia 07/13/2023    Portal hypertension (H) 07/13/2023    Scrotal abscess     Secondary esophageal 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 (H) 07/13/2023    Tobacco abuse disorder 03/11/2020    Type 2 diabetes mellitus with hyperglycemia (H) 12/22/2023       Past Surgical History:   Procedure Laterality Date    BENCH LIVER  3/5/2024    Procedure: Bench liver;  Surgeon: Ryder Cortez MD;  Location: [facility] OR    CHOLECYSTECTOMY      COLONOSCOPY N/A 1/2/2024    Procedure: COLONOSCOPY, WITH POLYPECTOMY;  Surgeon: Jak Urbina MD;  Location: [facility]    CV RIGHT HEART CATH MEASUREMENTS RECORDED N/A 1/30/2024    Procedure: Right Heart Catheterization;  Surgeon: Alfred Tafoya MD;  Location:  HEART CARDIAC CATH LAB    IR LIVER BIOPSY PERCUTANEOUS  3/15/2024    TONSILLECTOMY      TRANSPLANT LIVER  RECIPIENT  DONOR N/A 3/5/2024    Procedure: Transplant liver recipient  donor, bile duct stent placement;  Surgeon: Ryder Cortez MD;  Location: [facility] OR    VASECTOMY         Physical Exam    No exam today.      RESULTS  Creatinine   Date Value Ref Range Status   2024 1.17 0.67 - 1.17 mg/dL Final   2020 0.95 0.66 - 1.25 mg/dL Final     GFR Estimate   Date Value Ref Range Status   2024 75 >60 mL/min/1.73m2 Final   2020 >90 >60 mL/min/[1.73_m2] Final     Comment:     Non  GFR Calc  Starting 2018, serum creatinine based estimated GFR (eGFR) will be   calculated using the Chronic Kidney Disease Epidemiology Collaboration   (CKD-EPI) equation.       Hemoglobin A1C   Date Value Ref Range Status   2024 4.8 <5.7 % Final     Comment:     Normal <5.7%   Prediabetes 5.7-6.4%    Diabetes 6.5% or higher     Note: Adopted from ADA consensus guidelines.     Potassium   Date Value Ref Range Status   2024 4.5 3.4 - 5.3 mmol/L Final   2022 3.8 3.4 - 5.3 mmol/L Final   2020 4.2 3.4 - 5.3 mmol/L Final     Potassium POCT   Date Value Ref Range Status   2024 4.1 3.4 - 5.3 mmol/L Final     ALT   Date Value Ref Range Status   2024 81 (H) 0 - 70 U/L Final     Comment:     Reference intervals for this test were updated on 2023 to more accurately reflect our healthy population. There may be differences in the flagging of prior results with similar values performed with this method. Interpretation of those prior results can be made in the context of the updated reference intervals.     2006 50 0 - 70 U/L Final     AST   Date Value Ref Range Status   2024 47 (H) 0 - 45 U/L Final     Comment:     Reference intervals for this test were updated on 2023 to more accurately reflect our healthy population. There may be differences in the flagging of prior results with similar values performed with this method. Interpretation of  those prior results can be made in the context of the updated reference intervals.   12/19/2006 52 0 - 55 U/L Final     TSH   Date Value Ref Range Status   03/12/2022 2.18 0.40 - 4.00 mU/L Final       Cholesterol   Date Value Ref Range Status   03/12/2022 203 (H) <200 mg/dL Final   12/04/2021 231 (H) <200 mg/dL Final   07/05/2020 183 <200 mg/dL Final     HDL Cholesterol   Date Value Ref Range Status   07/05/2020 50 >39 mg/dL Final     Direct Measure HDL   Date Value Ref Range Status   03/12/2022 71 >=40 mg/dL Final   12/04/2021 76 >=40 mg/dL Final     LDL Cholesterol Calculated   Date Value Ref Range Status   03/12/2022 102 (H) <=100 mg/dL Final   12/04/2021 120 (H) <=100 mg/dL Final   07/05/2020 87 <100 mg/dL Final     Comment:     Desirable:       <100 mg/dl     Triglycerides   Date Value Ref Range Status   03/12/2022 151 (H) <150 mg/dL Final   12/04/2021 174 (H) <150 mg/dL Final   07/05/2020 229 (H) <150 mg/dL Final     Comment:     Borderline high:  150-199 mg/dl  High:             200-499 mg/dl  Very high:       >499 mg/dl           ASSESSMENT/PLAN:     TYPE 2 DIABETES MELLITUS: 33-year-old male with history of type 2 diabetes currently on a steroid taper following recent liver transplant on 3/5/2024.  Patient's blood sugars during recent hospitalization were high with use of steroids and tube feeding.  He is no longer receiving tube feedings and is currently on a steroid taper.  Patient took prednisone 80 mg this am and his prednisone dose will taper to 40 mg daily starting tomorrow.  He wears a Dexcom G6 7 sensor to monitor his blood sugar.  Fasting blood sugar was 162 this am and blood sugar at noon today was 218.  Will continue current insulin doses for now.  Patient and his wife are aware that his insulin requirements may be less as his prednisone dose is tapered.  Patient has follow-up with Liana Garcia RD/JARROD next week and his blood sugar values will be reviewed at that time and weekly while his  prednisone dose is being tapered.   I asked patient to avoid use of bedtime insulin at this time.    LIVER CIRRHOSIS: S/P liver transplant on 3/5/2024.  Patient on prednisone taper.  Followed closely by transplant staff.  FOLLOW UP: With Liana Garcia RD/JARROD next week and weekly while prednisone dose is being tapered and with me or other provider in 1 month.  Instructed to notify us if he has blood sugars in the 70 range or lower.    Spent reviewing chart, labs and Dexcom G7 sensor data= 6 minutes.  Time for video visit today= 24 minutes.  Time for documentation today= 15 minutes.    Total time for visit today= 45 minutes.  Geovanna Ferreira PA-C

## 2024-03-22 NOTE — TELEPHONE ENCOUNTER
Mary Lopez is a post-liver transplant patient who discharged on 03/21/24. Patient chooses to receive medications from [facility]. The spouse (Rosio) will be responsible for managing medications.    HEALTH BENEFIT: (MEDICA)  ID#[id number] GRP# [id number] (EFFECTIVE (DATE: 1/1/2023) )      PHARMACY BENEFIT: (CLEARSCRIPT)  PROCESSING INFO: ID#[id number] GRP# PCN#[id number] BIN#[id number] (EFFECTIVE (DATE: 1/1/2023) ).   DEDUCTIBLE ($2250) & MAX OUT-OF-POCKET ($3250)  DEDUCTIBLE AND OUT OF POCKET HAVE BEEN MEET  COPAY STRUCTURE:  % (10) FOR GENERIC  % (10) FOR BRAND  % (10) FOR NON-FORMULARY MEDICATIONS      TEST CLAIM SPECIALTY #28  MYCOPHENOLATE 250MG (#240/30DS)=$0  PROGRAF 1MG (#120/30DS)=$0  TACROLIMUS 1MG (#120/30DS)=$0  CYCLOSPORINE 100MG (#60/30DS)=$0  VALGANCICLOVIR 450MG (#60/30DS)=$0  VALACYCLOVIR 1GM (#90/30DS)=$0    TEST CLAIM DISCHARGE #27 (18 YEARS AND OLDER):  MYCOPHENOLATE 250MG (#240/30DS)=$0  PROGRAF 1MG (#120/30DS)=$0  TACROLIMUS 1MG (#120/30DS)=$0  CYCLOSPORINE 100MG (#60/30DS)=$0  VALGANCICLOVIR 450MG (#60/30DS)=$0  VALACYCLOVIR 1GM (#90/30DS)=$0    **CAN PATIENT FILL AT [facility] FOR MEDICATIONS LISTED? YES    Chantelle Boyd, [facility]

## 2024-03-22 NOTE — NURSING NOTE
Is the patient currently in the state of [place]? YES    Visit mode:VIDEO    If the visit is dropped, the patient can be reconnected by: TELEPHONE VISIT: Phone number:   Telephone Information:   Mobile [phone]       Will anyone else be joining the visit? NO  (If patient encounters technical issues they should call [phone]    How would you like to obtain your AVS? MyChart    Are changes needed to the allergy or medication list? Pt stated no med changes    Reason for visit: Video Visit (Follow-up after hospital discharge )    Hoa CHRISTY

## 2024-03-22 NOTE — PROGRESS NOTES
"Outcome for 03/22/24 9:43 AM: Glucose readings are under \"labs\" from patients recent hospitalization.   Margoth Thompson LPN       "

## 2024-03-22 NOTE — PLAN OF CARE
Occupational Therapy Discharge Summary    Reason for therapy discharge:    Discharged to home.    Progress towards therapy goal(s). See goals on Care Plan in [facility] electronic health record for goal details.  Goals partially met.  Barriers to achieving goals:   discharge from facility.    Therapy recommendation(s):    Continued therapy is recommended.  Rationale/Recommendations:  as indicated.    Goal Outcome Evaluation:

## 2024-03-22 NOTE — TELEPHONE ENCOUNTER
Patient Contacted for the patient to call back and schedule the following:    Appointment type: Return Diabetes   Provider: Jhon   Return date: 3/22  Specialty phone number: [phone]  Additional appointment(s) needed: NA   Additonal Notes: Spoke to pt spouse and scheduled per note below. If they have to re-schedule. Re-schedule per below instructions. Thank you.     CCs notified to schedule Hospital discharge follow up. Any PA Any location, ok to use [facility].   Daniella Saenz RN on 3/21/2024 at 1:44 PM      Quynh Vega on 3/22/2024 at 8:18 AM

## 2024-03-22 NOTE — PHARMACY-TRANSPLANT NOTE
Solid Organ Transplant Recipient Prior to Discharge Note    53 year old male s/p  donor liver transplant on 3/5/2024 with biliary stent placement.    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, PRAJAPATI -:  3/16: Methylprednisolone 500 mg  3/17: Methylprednisolone 500 mg  3/18: Methylprednisolone 500 mg  3/19: Methylprednisolone 200 mg  3/20: Methylprednisolone 162.5 mg  3/21: Methylprednisolone 118.75 mg, discharged to continue steroid taper with 80 mg prednisone on 3/22 followed by 40 mg on 3/24 then 20 mg on 3/25    Inpatient tacrolimus dose and trough level history:  3/5: Initiated tacrolimus at 2 mg twice daily   ----  3/19: Tac level 6.3 (10.5 hr); leave dose at 8 mg twice daily, switch from suspension to capsules  3/20: Tac level 6.3 mcg/L (10.5 hr); decrease dose to 6 mg twice daily with the addition of ketoconazole 100 mg daily as a booster  3/21: Tac level 6.9 mcg/L (11.5 hr); continue current dose    Discharge dose: 6 mg BID with ketoconazole boost     Opportunistic pathogen prophylaxis includes:  - PJP: trimethoprim/sulfamethoxazole 80/400 mg, currently dosed at 80/400 mg daily for eCrCl: 80.8 mL/min continued for 6 months.  Recommend adjusting doses based on renal function as follows:  eCrCl >30 mL/min: trimethoprim/sulfamethoxazole 80/400 mg daily  eCrCl 10-29 mL/min: trimethoprim/sulfamethoxazole 80/400 mg 3x/week  eCrCl <10 mL/min:  trimethoprim/sulfamethoxazole 80/400 mg 3x/week    - CMV  D-/R+: Valganciclovir for 3 months duration tentatively, currently dosed at 450 mg daily for eCrCl: 80.8 mL/min.  Recommend adjusting dose based on renal function as follows:   eCrCl >/= 60 mL/min: 450 mg daily for liver transplant patients  eCrCl 40-59 mL/min: 450 mg daily  eCrCl 25-39 mL/min:  450 mg every other day  eCrCl 10-24 mL/min:  450 mg twice weekly  eCrCl <10 mL/min:  450 mg twice weekly     Hepatic artery thrombosis prophylaxis:  -Aspirin 325 mg daily for 6 months    Biliary stent:  -Ursodiol 300 mg twice daily    Pharmacy has monitored for medication interactions and immunosuppression levels in conjunction with the multidisciplinary team. In anticipation for discharge, medication therapy needs have been addressed daily throughout the current admission via multidisciplinary rounds and/or discussions, order verification, daily clinical pharmacy review, and communication with prescribers.  Santino Osei, PharmD  PGY1 Resident

## 2024-03-23 ENCOUNTER — MYC MEDICAL ADVICE (OUTPATIENT)
Dept: FAMILY MEDICINE | Facility: CLINIC | Age: 54
End: 2024-03-23
Payer: COMMERCIAL

## 2024-03-24 NOTE — PROGRESS NOTES
Time of start: 2:30 pm   Time of end: 2:54 pm   Total duration of video visit: 24 minutes.  Providers location: Off-site.  Patient's location: [place].    ZACKERY Lopez is a 53 year-old male with history of type 2 diabetes mellitus.  Video visit today for diabetes follow-up following recent hospital discharge.  Patient recently admitted 3/4/2024-3/21/2024 and underwent liver transplant on 3/5/2024.  Patient was followed by the inpatient diabetes team during his hospitalization.  He was last seen by Suzanne Todd PA-C outpatient in 2023.  He has also been seen by Liana Garcia RD/JARROD.  As above, underwent  donor liver transplant on 3/5/2024.  Alk phos remains elevated which is being monitored closely by the transplant team.  Patient did require tube feedings during his hospitalization.  He is not receiving any tube feedings at this time.  He also has received steroids.  He is currently on a steroid taper taking 80 mg daily which will be decreased to 40 mg daily starting tomorrow.  Since his discharge from the hospital yesterday he reports feeling fine.  For his diabetes, patient is currently taking Semglee 23 units daily and Humalog 4 units with meals with correction ( 1 unit/50 for BG > 140 with meals and > 200 at hs ).  Recent A1C was 4.8 % on 2024.  Patient anemic.  Patient was just discharged from the hospital yesterday.  I have very little blood sugar data since he has been home.  Fasting blood sugar 162 this am and blood sugar at noon today is 218.  No hypoglycemic.  On ROS today, no tube feedings at this time.   Patient on steroid taper.  Quit smoking.  Occasional chews tobacco.  Denies blurred vision, nausea, vomiting, shortness of breath at rest, fever or rigors.  Denies chest pain, abdominal pain, diarrhea, constipation, dysuria and also denies symptoms of diabetic neuropathy.  No foot ulcers.    Diabetes Care  Retinopathy: None per patient.  Nephropathy: Urine  microalbuminuria was negative in March 2022.  Neuropathy: None.  Foot Exam: No exam today.  Taking aspirin: Yes.  Lipids:  in March 2022.  Insulin: Basal and mealtime insulin with correction.  DM meds: none.  Testing: Dexcom G7 sensor.  Hypoglycemia treatment: Has Gvoke pen.    ROS  See under HPI.    Allergies  Allergies   Allergen Reactions    Other Food Allergy Anaphylaxis     Legumes (black beans, baked beans, chickpeas)    Pineapple Itching       Medications  Current Outpatient Medications   Medication Sig Dispense Refill    acetaminophen (TYLENOL) 500 MG tablet Take 1-2 tablets (500-1,000 mg) by mouth every 6 hours as needed for mild pain 30 tablet 0    aspirin (ASA) 325 MG tablet 1 tablet (325 mg) by Oral or Feeding Tube route daily 30 tablet 5    blood glucose (NO BRAND SPECIFIED) test strip Use to test blood sugar two times daily or as directed. To accompany: Blood Glucose Monitor Brands: per insurance. 100 strip 6    blood glucose monitoring (NO BRAND SPECIFIED) meter device kit Use to test blood sugar twice times daily or as directed. Preferred blood glucose meter OR supplies to accompany: Blood Glucose Monitor Brands: per insurance. 1 kit 0    Continuous Blood Gluc Sensor (DEXCOM G7 SENSOR) MISC Change every 10 days. 3 each 5    Continuous Blood Gluc Sensor (DEXCOM G7 SENSOR) MISC Change every 10 days. 3 each 5    Glucagon (GVOKE HYPOPEN) 1 MG/0.2ML pen Inject the contents of 1 device under the skin into lower abdomen, outer thigh, or outer upper arm as needed for hypoglycemia. If no response after 15 minutes, additional 1 mg dose from a new device may be injected while waiting for emergency assistance. 0.4 mL 1    hydrOXYzine HCl (ATARAX) 25 MG tablet 1 tablet (25 mg) by Oral or Feeding Tube route every 6 hours as needed for other (adjuvant pain) 20 tablet 0    insulin aspart (NOVOLOG PEN) 100 UNIT/ML pen Inject 1-10 Units Subcutaneous 3 times daily (before meals) Humalog 4 units with meals plus  correction scale 1:50 >140 TID AC and 1:50 >200 HS Pre-meal Humalog correction scale: Do Not give Correction Insulin if Pre-Meal BG less than 140. For Pre-Meal  - 189 give 1 unit. For Pre-Meal  - 239 give 2 units. For Pre-Meal  - 289 give 3 units. For Pre-Meal  - 339 give 4 units. For Pre-Meal - 399 give 5 units. For Pre-Meal -449 give 6 units For Pre-Meal BG greater than or equal to 450 give 7 units. To be given with prandial insulin, and based on pre-meal blood glucose. Bedtime Humalog correction scale: Do Not give Bedtime Correction Insulin if BG less than 200. For  - 249 give 1 units. For  - 299 give 2 units. For  - 349 give 3 units. For  -399 give 4 units. For BG greater than or equal to 400 give 5 units. Notify provider if glucose greater than or equal to 350 mg/dL after administration of correction dose. 15 mL 1    Insulin Glargine-yfgn (SEMGLEE, YFGN,) 100 UNIT/ML SOLN Inject 23 Units Subcutaneous daily 10 mL 2    insulin pen needle (32G X 4 MM) 32G X 4 MM miscellaneous Use as directed by provider Per insurance coverage 200 each 1    insulin pen needle (BD PEN NEEDLE SHERRIE 2ND GEN) 32G X 4 MM miscellaneous USES 5 PER  each 11    ketoconazole (NIZORAL) 200 MG tablet Take 0.5 tablets (100 mg) by mouth daily 30 tablet 2    magnesium oxide (MAG-OX) 400 MG tablet Take 2 tablets (800 mg) by mouth 2 times daily for 180 days 120 tablet 5    melatonin 10 MG TABS tablet Take 1 tablet (10 mg) by mouth nightly as needed for sleep      multivitamin w/minerals (THERA-VIT-M) tablet TAKE 1 TABLET BY MOUTH DAILY 90 tablet 1    mycophenolate (GENERIC EQUIVALENT) 250 MG capsule Take 3 capsules (750 mg) by mouth 2 times daily 180 capsule 11    nicotine (NICODERM CQ) 7 MG/24HR 24 hr patch Place 1 patch onto the skin daily as needed for nicotine withdrawal symptoms 7 patch 3    omeprazole (PRILOSEC) 20 MG DR capsule Take 1 capsule (20 mg) by mouth 2 times daily 180  capsule 1    polyethylene glycol (MIRALAX) 17 GM/Dose powder Take 17 g by mouth daily 510 g 1    predniSONE (DELTASONE) 20 MG tablet 3/22: 80 mg 3/23: 40 mg 3/24: 20 mg 7 tablet 0    QUEtiapine (SEROQUEL) 25 MG tablet Take 1 tablet (25 mg) by mouth at bedtime 30 tablet 0    senna-docusate (SENOKOT-S/PERICOLACE) 8.6-50 MG tablet 1-2 tablets by Oral or Feeding Tube route 2 times daily as needed for constipation 60 tablet 1    sulfamethoxazole-trimethoprim (BACTRIM) 400-80 MG tablet Take 1 tablet by mouth every evening 30 tablet 5    tacrolimus (GENERIC EQUIVALENT) 1 MG capsule Take 6 capsules (6 mg) by mouth 2 times daily 360 capsule 11    thin (NO BRAND SPECIFIED) lancets Use with lanceting device. To accompany: Blood Glucose Monitor Brands: per insurance. 100 each 6    ursodiol (ACTIGALL) 300 MG capsule Take 1 capsule (300 mg) by mouth 2 times daily 60 capsule 5    valGANciclovir (VALCYTE) 450 MG tablet Take 1 tablet (450 mg) by mouth every evening 30 tablet 2       Family History  family history includes Anxiety Disorder in his mother; Bipolar Disorder in his mother; Chronic Obstructive Pulmonary Disease in his mother; Depression in his mother; Diabetes in his father; Diabetes Type 2  in his brother; Lung Cancer (age of onset: 81) in his mother; Morbid Obesity in his father; Substance Abuse in his maternal grandfather and paternal grandfather.    Social History   reports that he has quit smoking. His smoking use included cigarettes. He has never been exposed to tobacco smoke. His smokeless tobacco use includes chew. He reports that he does not currently use alcohol after a past usage of about 12.0 standard drinks of alcohol per week. He reports that he does not currently use drugs.     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    ACR PRAJAPATI 6-7/, treated with steroids    Mild hyperlipidemia 2021    Persistent insomnia 2023    Portal hypertension (H) 2023    Scrotal abscess     Secondary esophageal 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 (H) 2023    Tobacco abuse disorder 2020    Type 2 diabetes mellitus with hyperglycemia (H) 2023       Past Surgical History:   Procedure Laterality Date    BENCH LIVER  3/5/2024    Procedure: Bench liver;  Surgeon: Ryder Cortez MD;  Location: [facility] OR    CHOLECYSTECTOMY      COLONOSCOPY N/A 2024    Procedure: COLONOSCOPY, WITH POLYPECTOMY;  Surgeon: Jak Urbina MD;  Location: [facility]    CV RIGHT HEART CATH MEASUREMENTS RECORDED N/A 2024    Procedure: Right Heart Catheterization;  Surgeon: Alfred Tafoya MD;  Location:  [facility]    IR LIVER BIOPSY PERCUTANEOUS  3/15/2024    TONSILLECTOMY      TRANSPLANT LIVER RECIPIENT  DONOR N/A 3/5/2024    Procedure: Transplant liver recipient  donor, bile duct stent placement;  Surgeon: Ryder Cortez MD;  Location: [facility] OR    VASECTOMY         Physical Exam    No exam today.      RESULTS  Creatinine   Date Value Ref Range Status   2024 1.17 0.67 - 1.17 mg/dL Final   2020 0.95 0.66 - 1.25 mg/dL Final     GFR Estimate   Date Value Ref Range Status   2024 75 >60 mL/min/1.73m2 Final   2020 >90 >60 mL/min/[1.73_m2] Final     Comment:     Non  GFR Calc  Starting 2018, serum creatinine based estimated GFR (eGFR) will be    calculated using the Chronic Kidney Disease Epidemiology Collaboration   (CKD-EPI) equation.       Hemoglobin A1C   Date Value Ref Range Status   02/25/2024 4.8 <5.7 % Final     Comment:     Normal <5.7%   Prediabetes 5.7-6.4%    Diabetes 6.5% or higher     Note: Adopted from ADA consensus guidelines.     Potassium   Date Value Ref Range Status   03/21/2024 4.5 3.4 - 5.3 mmol/L Final   03/12/2022 3.8 3.4 - 5.3 mmol/L Final   07/05/2020 4.2 3.4 - 5.3 mmol/L Final     Potassium POCT   Date Value Ref Range Status   03/05/2024 4.1 3.4 - 5.3 mmol/L Final     ALT   Date Value Ref Range Status   03/21/2024 81 (H) 0 - 70 U/L Final     Comment:     Reference intervals for this test were updated on 6/12/2023 to more accurately reflect our healthy population. There may be differences in the flagging of prior results with similar values performed with this method. Interpretation of those prior results can be made in the context of the updated reference intervals.     12/19/2006 50 0 - 70 U/L Final     AST   Date Value Ref Range Status   03/21/2024 47 (H) 0 - 45 U/L Final     Comment:     Reference intervals for this test were updated on 6/12/2023 to more accurately reflect our healthy population. There may be differences in the flagging of prior results with similar values performed with this method. Interpretation of those prior results can be made in the context of the updated reference intervals.   12/19/2006 52 0 - 55 U/L Final     TSH   Date Value Ref Range Status   03/12/2022 2.18 0.40 - 4.00 mU/L Final       Cholesterol   Date Value Ref Range Status   03/12/2022 203 (H) <200 mg/dL Final   12/04/2021 231 (H) <200 mg/dL Final   07/05/2020 183 <200 mg/dL Final     HDL Cholesterol   Date Value Ref Range Status   07/05/2020 50 >39 mg/dL Final     Direct Measure HDL   Date Value Ref Range Status   03/12/2022 71 >=40 mg/dL Final   12/04/2021 76 >=40 mg/dL Final     LDL Cholesterol Calculated   Date Value Ref Range Status    03/12/2022 102 (H) <=100 mg/dL Final   12/04/2021 120 (H) <=100 mg/dL Final   07/05/2020 87 <100 mg/dL Final     Comment:     Desirable:       <100 mg/dl     Triglycerides   Date Value Ref Range Status   03/12/2022 151 (H) <150 mg/dL Final   12/04/2021 174 (H) <150 mg/dL Final   07/05/2020 229 (H) <150 mg/dL Final     Comment:     Borderline high:  150-199 mg/dl  High:             200-499 mg/dl  Very high:       >499 mg/dl           ASSESSMENT/PLAN:     TYPE 2 DIABETES MELLITUS: 33-year-old male with history of type 2 diabetes currently on a steroid taper following recent liver transplant on 3/5/2024.  Patient's blood sugars during recent hospitalization were high with use of steroids and tube feeding.  He is no longer receiving tube feedings and is currently on a steroid taper.  Patient took prednisone 80 mg this am and his prednisone dose will taper to 40 mg daily starting tomorrow.  He wears a Dexcom G6 7 sensor to monitor his blood sugar.  Fasting blood sugar was 162 this am and blood sugar at noon today was 218.  Will continue current insulin doses for now.  Patient and his wife are aware that his insulin requirements may be less as his prednisone dose is tapered.  Patient has follow-up with Liana Garcia RD/JARROD next week and his blood sugar values will be reviewed at that time and weekly while his prednisone dose is being tapered.   I asked patient to avoid use of bedtime insulin at this time.    LIVER CIRRHOSIS: S/P liver transplant on 3/5/2024.  Patient on prednisone taper.  Followed closely by transplant staff.  FOLLOW UP: With Liana LINDSAY next week and weekly while prednisone dose is being tapered and with me or other provider in 1 month.  Instructed to notify us if he has blood sugars in the 70 range or lower.    Spent reviewing chart, labs and Dexcom G7 sensor data= 6 minutes.  Time for video visit today= 24 minutes.  Time for documentation today= 15 minutes.    Total time for visit today= 45  minutes.    Geovanna Ferreira PA-C

## 2024-03-25 ENCOUNTER — MYC MEDICAL ADVICE (OUTPATIENT)
Dept: PHARMACY | Facility: CLINIC | Age: 54
End: 2024-03-25

## 2024-03-25 ENCOUNTER — TELEPHONE (OUTPATIENT)
Dept: TRANSPLANT | Facility: CLINIC | Age: 54
End: 2024-03-25

## 2024-03-25 ENCOUNTER — LAB (OUTPATIENT)
Dept: LAB | Facility: CLINIC | Age: 54
End: 2024-03-25
Attending: TRANSPLANT SURGERY
Payer: COMMERCIAL

## 2024-03-25 ENCOUNTER — OFFICE VISIT (OUTPATIENT)
Dept: TRANSPLANT | Facility: CLINIC | Age: 54
End: 2024-03-25
Attending: TRANSPLANT SURGERY
Payer: COMMERCIAL

## 2024-03-25 VITALS
OXYGEN SATURATION: 99 % | SYSTOLIC BLOOD PRESSURE: 114 MMHG | DIASTOLIC BLOOD PRESSURE: 80 MMHG | HEART RATE: 86 BPM | TEMPERATURE: 98.5 F | BODY MASS INDEX: 30.02 KG/M2 | WEIGHT: 198.1 LBS

## 2024-03-25 DIAGNOSIS — K70.11 ALCOHOLIC HEPATITIS WITH ASCITES (H): Chronic | ICD-10-CM

## 2024-03-25 DIAGNOSIS — Z94.4 LIVER REPLACED BY TRANSPLANT (H): Chronic | ICD-10-CM

## 2024-03-25 DIAGNOSIS — Z94.4 LIVER REPLACED BY TRANSPLANT (H): ICD-10-CM

## 2024-03-25 LAB
ALBUMIN SERPL BCG-MCNC: 3.3 G/DL (ref 3.5–5.2)
ALP SERPL-CCNC: 235 U/L (ref 40–150)
ALT SERPL W P-5'-P-CCNC: 55 U/L (ref 0–70)
ANION GAP SERPL CALCULATED.3IONS-SCNC: 8 MMOL/L (ref 7–15)
AST SERPL W P-5'-P-CCNC: 30 U/L (ref 0–45)
BILIRUB DIRECT SERPL-MCNC: 0.73 MG/DL (ref 0–0.3)
BILIRUB SERPL-MCNC: 1.2 MG/DL
BUN SERPL-MCNC: 15.1 MG/DL (ref 6–20)
CALCIUM SERPL-MCNC: 9.4 MG/DL (ref 8.6–10)
CHLORIDE SERPL-SCNC: 103 MMOL/L (ref 98–107)
CREAT SERPL-MCNC: 0.93 MG/DL (ref 0.67–1.17)
DEPRECATED HCO3 PLAS-SCNC: 25 MMOL/L (ref 22–29)
EGFRCR SERPLBLD CKD-EPI 2021: >90 ML/MIN/1.73M2
ERYTHROCYTE [DISTWIDTH] IN BLOOD BY AUTOMATED COUNT: 18.2 % (ref 10–15)
GLUCOSE SERPL-MCNC: 256 MG/DL (ref 70–99)
HCT VFR BLD AUTO: 36.7 % (ref 40–53)
HGB BLD-MCNC: 11.8 G/DL (ref 13.3–17.7)
MAGNESIUM SERPL-MCNC: 1.5 MG/DL (ref 1.7–2.3)
MCH RBC QN AUTO: 31.4 PG (ref 26.5–33)
MCHC RBC AUTO-ENTMCNC: 32.2 G/DL (ref 31.5–36.5)
MCV RBC AUTO: 98 FL (ref 78–100)
PHOSPHATE SERPL-MCNC: 3.8 MG/DL (ref 2.5–4.5)
PLATELET # BLD AUTO: 431 10E3/UL (ref 150–450)
POTASSIUM SERPL-SCNC: 5 MMOL/L (ref 3.4–5.3)
PROT SERPL-MCNC: 6 G/DL (ref 6.4–8.3)
RBC # BLD AUTO: 3.76 10E6/UL (ref 4.4–5.9)
SODIUM SERPL-SCNC: 136 MMOL/L (ref 135–145)
TACROLIMUS BLD-MCNC: 4.4 UG/L (ref 5–15)
TME LAST DOSE: ABNORMAL H
TME LAST DOSE: ABNORMAL H
WBC # BLD AUTO: 6.4 10E3/UL (ref 4–11)

## 2024-03-25 PROCEDURE — 80197 ASSAY OF TACROLIMUS: CPT | Performed by: TRANSPLANT SURGERY

## 2024-03-25 PROCEDURE — 99000 SPECIMEN HANDLING OFFICE-LAB: CPT | Performed by: PATHOLOGY

## 2024-03-25 PROCEDURE — 99213 OFFICE O/P EST LOW 20 MIN: CPT | Performed by: TRANSPLANT SURGERY

## 2024-03-25 PROCEDURE — 80053 COMPREHEN METABOLIC PANEL: CPT | Performed by: PATHOLOGY

## 2024-03-25 PROCEDURE — 82248 BILIRUBIN DIRECT: CPT | Performed by: PATHOLOGY

## 2024-03-25 PROCEDURE — 36415 COLL VENOUS BLD VENIPUNCTURE: CPT | Performed by: PATHOLOGY

## 2024-03-25 PROCEDURE — 85027 COMPLETE CBC AUTOMATED: CPT | Performed by: PATHOLOGY

## 2024-03-25 PROCEDURE — 83735 ASSAY OF MAGNESIUM: CPT | Performed by: PATHOLOGY

## 2024-03-25 PROCEDURE — 99213 OFFICE O/P EST LOW 20 MIN: CPT | Mod: 24 | Performed by: TRANSPLANT SURGERY

## 2024-03-25 PROCEDURE — G0480 DRUG TEST DEF 1-7 CLASSES: HCPCS | Mod: 90 | Performed by: PATHOLOGY

## 2024-03-25 PROCEDURE — 84100 ASSAY OF PHOSPHORUS: CPT | Performed by: PATHOLOGY

## 2024-03-25 RX ORDER — TACROLIMUS 1 MG/1
5 CAPSULE ORAL 2 TIMES DAILY
Qty: 450 CAPSULE | Refills: 3 | Status: SHIPPED | OUTPATIENT
Start: 2024-03-25 | End: 2024-04-03

## 2024-03-25 NOTE — TELEPHONE ENCOUNTER
Call to patient for general follow-up post liver transplant.    Appetite: no appetite but is making himself eat and drinks a protein shake.  Will start weighing daily  Diarrhea: none. Bowel movement daily. Did take miralax x 1.   Nausea: no emesis, minimal nausea.  Taking prilosec.  Did have some indigestion prior to transplant.  Wound: Looking good - tatiana ask about staple.   Activity: getting up and around, doing stairs.  Gets up on his own.  PT will be coming.   Pain management: Shoulder is bothering him (previous fall).  Pt will come.  Anxiety / depression: none  Physician follow-up: Diego today.  Lab frequency: 2 x / week  Med changes: none yet.    Adina is off of work for half days,but is working from home.

## 2024-03-25 NOTE — PROGRESS NOTES
Transplant Surgery -OUTPATIENT IMMUNOSUPPRESSION PROGRESS NOTE    Date of Visit: 03/25/2024    Transplants:  3/5/2024 (Liver); Postoperative day:  20  ASSESMENT AND PLAN:  1.Graft Function:Liver allograft: no rejection or technical problems.    2.Immunosuppression Management: keep tacrolimus levels at 10  3.Hypertension:ok  4.Renal Function:better  5.Lab frequency: twice weekly  6.Other:  Wound healthy, remove staples today    Date: March 25, 2024    Transplant:  [x]                             Liver [x]                              Kidney []                             Pancreas []                              Other:             Chief Complaint:Follow Up (Liver transplant follow-up)    Doing well, has low appetite  History of Present Illness:  Patient Active Problem List   Diagnosis    Primary hypertension    Tobacco abuse disorder    Mild hyperlipidemia    Morbid obesity (H)    Portal hypertensive gastropathy (H)    Secondary esophageal 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 (H)    Anemia due to unknown mechanism    Persistent insomnia    Bilateral leg edema    Hyponatremia    Alcohol use disorder, severe, in early remission (H)    Gastric varices    Severe malnutrition (H24)    Type 2 diabetes mellitus without complication, with long-term current use of insulin (H)    Generalized muscle weakness    Pulmonary HTN (H)    Hepatic encephalopathy (H)    ANGEL (acute kidney injury) (H24)    Sepsis with acute liver failure and septic shock without hepatic coma, due to unspecified organism (H)    Acute kidney failure, unspecified (H)    Delirium    Liver replaced by transplant (H)    Immunosuppressed status (H24)    Liver transplant rejection (H)    Acute post-operative pain    Atrial fibrillation with RVR (H)    Pleural effusion    Anemia due to blood loss, acute    Hypomagnesemia    Hypervolemia     SOCIAL /FAMILY HISTORY: [x]                  No recent change    Past Medical History:   Diagnosis Date    ANGEL (acute kidney injury) (H24)      Alcoholic cirrhosis of liver without ascites (H) 2023    Alcoholic hepatitis with ascites (H28) 10/03/2023    Alcoholic hepatitis without ascites (H28) 2023    Closed fracture of one rib of left side 2023    Concussion without loss of consciousness 2020    Decompensated hepatic cirrhosis (H) 09/15/2023    Diabetes mellitus, type 2 (H) 2023    Essential hypertension 2020    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)     Liver replaced by transplant (H) 2024    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    ACR PRAJAPATI 6-7/, treated with steroids    Mild hyperlipidemia 2021    Persistent insomnia 2023    Portal hypertension (H) 2023    Scrotal abscess     Secondary esophageal 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 (H) 2023    Tobacco abuse disorder 2020    Type 2 diabetes mellitus with hyperglycemia (H) 2023     Past Surgical History:   Procedure Laterality Date    BENCH LIVER  3/5/2024    Procedure: Bench liver;  Surgeon: Ryder Cortez MD;  Location: [facility]    CHOLECYSTECTOMY      COLONOSCOPY N/A 2024    Procedure: COLONOSCOPY, WITH POLYPECTOMY;  Surgeon: Jak Urbina MD;  Location: [facility]    CV RIGHT HEART CATH MEASUREMENTS RECORDED N/A 2024    Procedure: Right Heart Catheterization;  Surgeon: Alfred Tafoya MD;  Location:  [facility] CARDIAC CATH LAB    IR LIVER BIOPSY PERCUTANEOUS  3/15/2024    TONSILLECTOMY      TRANSPLANT LIVER RECIPIENT  DONOR N/A 3/5/2024    Procedure: Transplant liver recipient  donor, bile duct stent placement;  Surgeon: Ryder Cortez MD;  Location: [facility] OR    VASECTOMY       Social History     Socioeconomic History    Marital status:      Spouse name: Not on file    Number of children: Not on file    Years of education: Not on file    Highest education level: Not on file   Occupational History    Occupation: Not working now   Tobacco Use    Smoking status: Former      Types: Cigarettes     Passive exposure: Never    Smokeless tobacco: Current     Types: Chew     Last attempt to quit: 1/1/2004    Tobacco comments:     Chew daily 1/3 of a tin per day   Vaping Use    Vaping Use: Never used   Substance and Sexual Activity    Alcohol use: Not Currently     Alcohol/week: 12.0 standard drinks of alcohol     Types: 12 Standard drinks or equivalent per week     Comment: Sober since 6/25/2023    Drug use: Not Currently    Sexual activity: Yes     Partners: Female     Birth control/protection: Male Surgical   Other Topics Concern    Parent/sibling w/ CABG, MI or angioplasty before 65F 55M? No   Social History Narrative    Not on file     Social Determinants of Health     Financial Resource Strain: Low Risk  (12/22/2023)    Financial Resource Strain     Within the past 12 months, have you or your family members you live with been unable to get utilities (heat, electricity) when it was really needed?: No   Food Insecurity: Low Risk  (12/22/2023)    Food Insecurity     Within the past 12 months, did you worry that your food would run out before you got money to buy more?: No     Within the past 12 months, did the food you bought just not last and you didn t have money to get more?: No   Transportation Needs: Low Risk  (12/22/2023)    Transportation Needs     Within the past 12 months, has lack of transportation kept you from medical appointments, getting your medicines, non-medical meetings or appointments, work, or from getting things that you need?: No   Physical Activity: Not on file   Stress: Not on file   Social Connections: Not on file   Interpersonal Safety: Low Risk  (12/22/2023)    Interpersonal Safety     Do you feel physically and emotionally safe where you currently live?: Yes     Within the past 12 months, have you been hit, slapped, kicked or otherwise physically hurt by someone?: No     Within the past 12 months, have you been humiliated or emotionally abused in other ways by  your partner or ex-partner?: No   Housing Stability: Low Risk  (2023)    Housing Stability     Do you have housing? : Yes     Are you worried about losing your housing?: No     Prescription Medications as of 3/25/2024         Rx Number Disp Refills Start End Last Dispensed Date Next Fill Date Owning Pharmacy    acetaminophen (TYLENOL) 500 MG tablet  30 tablet 0 3/21/2024 --   [address]    Sig: Take 1-2 tablets (500-1,000 mg) by mouth every 6 hours as needed for mild pain    Class: E-Prescribe    Route: Oral    aspirin (ASA) 325 MG tablet  30 tablet 5 3/22/2024 --   [address]    Si tablet (325 mg) by Oral or Feeding Tube route daily    Class: E-Prescribe    Route: Oral or Feeding Tube    blood glucose (NO BRAND SPECIFIED) test strip  100 strip 6 10/24/2023 --   [id number] - [address]    Sig: Use to test blood sugar two times daily or as directed. To accompany: Blood Glucose Monitor Brands: per insurance.    Class: E-Prescribe    blood glucose monitoring (NO BRAND SPECIFIED) meter device kit  1 kit 0 10/24/2023 --   [id number] - [address]    Sig: Use to test blood sugar twice times daily or as directed. Preferred blood glucose meter OR supplies to accompany: Blood Glucose Monitor Brands: per insurance.    Class: E-Prescribe    Continuous Blood Gluc Sensor (DEXCOM G7 SENSOR) MISC  3 each 5 3/21/2024 --   [address]    Sig: Change every 10 days.    Class: E-Prescribe    Continuous Blood Gluc Sensor (DEXCOM G7 SENSOR) MISC  3 each 5 2023 --   [facility] #[id number] - [place], [place] - [address] [place] [address]    Sig: Change every 10 days.    Class: E-Prescribe    Glucagon (GVOKE  HYPOPEN) 1 MG/0.2ML pen  0.4 mL 1 3/21/2024 --   [address]    Sig: Inject the contents of 1 device under the skin into lower abdomen, outer thigh, or outer upper arm as needed for hypoglycemia. If no response after 15 minutes, additional 1 mg dose from a new device may be injected while waiting for emergency assistance.    Class: E-Prescribe    Notes to Pharmacy: Replaces [id number] per insurance formulary    hydrOXYzine HCl (ATARAX) 25 MG tablet  20 tablet 0 3/21/2024 --   [address]    Si tablet (25 mg) by Oral or Feeding Tube route every 6 hours as needed for other (adjuvant pain)    Class: E-Prescribe    Route: Oral or Feeding Tube    insulin aspart (NOVOLOG PEN) 100 UNIT/ML pen  15 mL 1 3/21/2024 --   [address]    Sig: Inject 1-10 Units Subcutaneous 3 times daily (before meals) Humalog 4 units with meals plus correction scale 1:50 >140 TID AC and 1:50 >200 HS Pre-meal Humalog correction scale: Do Not give Correction Insulin if Pre-Meal BG less than 140. For Pre-Meal  - 189 give 1 unit. For Pre-Meal  - 239 give 2 units. For Pre-Meal  - 289 give 3 units. For Pre-Meal  - 339 give 4 units. For Pre-Meal - 399 give 5 units. For Pre-Meal -449 give 6 units For Pre-Meal BG greater than or equal to 450 give 7 units. To be given with prandial insulin, and based on pre-meal blood glucose. Bedtime Humalog correction scale: Do Not give Bedtime Correction Insulin if BG less than 200. For  - 249 give 1 units. For  - 299 give 2 units. For  - 349 give 3 units. For  -399 give 4 units. For BG greater than or equal to 400 give 5 units. Notify provider if glucose greater than or equal to 350 mg/dL after administration of correction dose.    Class: E-Prescribe    Route: Subcutaneous    Insulin  Glargine-yfgn (SEMGLEE, YFGN,) 100 UNIT/ML SOLN  10 mL 2 3/21/2024 --   [address]    Sig: Inject 23 Units Subcutaneous daily    Class: E-Prescribe    Route: Subcutaneous    insulin pen needle (32G X 4 MM) 32G X 4 MM miscellaneous  200 each 1 3/21/2024 --   [address]    Sig: Use as directed by provider Per insurance coverage    Class: E-Prescribe    insulin pen needle (BD PEN NEEDLE SHERRIE 2ND GEN) 32G X 4 MM miscellaneous  100 each 11 1/8/2024 --   [facility] #[id number] - "[employer] [place] - [address] [place] [address]    Sig: USES 5 PER DAY    Class: E-Prescribe    ketoconazole (NIZORAL) 200 MG tablet  30 tablet 2 3/22/2024 --   [address]    Sig: Take 0.5 tablets (100 mg) by mouth daily    Class: E-Prescribe    Route: Oral    magnesium oxide (MAG-OX) 400 MG tablet  120 tablet 5 3/21/2024 9/17/2024   [address]    Sig: Take 2 tablets (800 mg) by mouth 2 times daily for 180 days    Class: E-Prescribe    Route: Oral    melatonin 10 MG TABS tablet  -- -- 8/3/2023 --       Sig: Take 1 tablet (10 mg) by mouth nightly as needed for sleep    Class: OTC    Route: Oral    multivitamin w/minerals (THERA-VIT-M) tablet  90 tablet 1 2/2/2024 --   [id number] - "[employer] [place] - [address] [facility]    Sig: TAKE 1 TABLET BY MOUTH DAILY    Class: E-Prescribe    Route: Oral    Renewals       Renewal provider: Jimmie Hoyt MD            mycophenolate (GENERIC EQUIVALENT) 250 MG capsule  180 capsule 11 3/21/2024 --   [address]    Sig: Take 3 capsules (750 mg) by mouth 2 times daily    Class: E-Prescribe    Route: Oral    nicotine (NICODERM CQ) 7 MG/24HR 24 hr patch  7 patch 3  3/21/2024 --   [address]    Sig: Place 1 patch onto the skin daily as needed for nicotine withdrawal symptoms    Class: E-Prescribe    Route: Transdermal    omeprazole (PRILOSEC) 20 MG DR capsule  180 capsule 1 8/3/2023 --       Sig: Take 1 capsule (20 mg) by mouth 2 times daily    Class: Historical    Route: Oral    polyethylene glycol (MIRALAX) 17 GM/Dose powder  510 g 1 3/21/2024 --   [address]    Sig: Take 17 g by mouth daily    Class: E-Prescribe    Route: Oral    predniSONE (DELTASONE) 20 MG tablet  7 tablet 0 3/22/2024 --   [address]    Sig: 3/22: 80 mg 3/23: 40 mg 3/24: 20 mg    Class: E-Prescribe    Notes to Pharmacy: 3/22: 80 mg 3/23: 40 mg 3/24: 20 mg    QUEtiapine (SEROQUEL) 25 MG tablet  30 tablet 0 3/21/2024 --   [address]    Sig: Take 1 tablet (25 mg) by mouth at bedtime    Class: E-Prescribe    Route: Oral    senna-docusate (SENOKOT-S/PERICOLACE) 8.6-50 MG tablet  60 tablet 1 3/21/2024 --   [address]    Si-2 tablets by Oral or Feeding Tube route 2 times daily as needed for constipation    Class: E-Prescribe    Route: Oral or Feeding Tube    sulfamethoxazole-trimethoprim (BACTRIM) 400-80 MG tablet  30 tablet 5 3/21/2024 --   [address]    Sig: Take 1 tablet by mouth every evening    Class: E-Prescribe    Route: Oral    tacrolimus (GENERIC EQUIVALENT) 1 MG capsule  360 capsule 11 3/21/2024 --   [address]    Sig: Take 6 capsules (6 mg) by mouth 2 times daily    Class: E-Prescribe    Route: Oral    thin (NO BRAND SPECIFIED) lancets  100 each 6 10/24/2023 --   [facility]  #[id number] - [place], [place] - [address] [facility]    Sig: Use with lanceting device. To accompany: Blood Glucose Monitor Brands: per insurance.    Class: E-Prescribe    ursodiol (ACTIGALL) 300 MG capsule  60 capsule 5 3/21/2024 --   [place], [place] - [address] [place] [address]    Sig: Take 1 capsule (300 mg) by mouth 2 times daily    Class: E-Prescribe    Route: Oral    valGANciclovir (VALCYTE) 450 MG tablet  30 tablet 2 3/21/2024 --   [address]    Sig: Take 1 tablet (450 mg) by mouth every evening    Class: E-Prescribe    Route: Oral          Other food allergy and Pineapple   REVIEW OF SYSTEMS (check box if normal)  [x]               GENERAL  [x]                 PULMONARY [x]                GENITOURINARY  [x]                CNS                 [x]                 CARDIAC  [x]                 ENDOCRINE  [x]                EARS,NOSE,THROAT [x]                 GASTROINTESTINAL [x]                 NEUROLOGIC    [x]                MUSCLOSKELTAL  [x]                  HEMATOLOGY      PHYSICAL EXAM (check box if normal)/80 (BP Location: Right arm, Patient Position: Chair, Cuff Size: Adult Regular)   Pulse 86   Temp 98.5  F (36.9  C) (Oral)   Wt 89.9 kg (198 lb 1.6 oz)   SpO2 99%   BMI 30.02 kg/m          [x]            GENERAL:    [x]       EYES:  ICTERIC   []        YES  []                    NO  [x]           EXTREMITIES: Clubbing []       Y     [x]           N    [x]           EARS, NOSE, THROAT: Membranes Moist    YES   [x]                   NO []                  [x]           LUNGS:  CLEAR    YES       [x]                  NO    []                                [x]           SKIN: Jaundice           YES       []                  NO    [x]                   Rash: YES       []                  NO    [x]                                     [x]             HEART: Regular Rate          YES       [x]                   NO    []                   Incision Clean:  YES       [x]                  NO    []                                [x]                    ABDOMEN: Wound healthy Organomegaly YES       []                  NO    [x]                       [x]                    NEUROLOGICAL:  Nonfocal  YES       [x]                  NO    []                       [x]                    Hernia YES       []                  NO    [x]                   PSYCHIATRIC:  Appropriate  YES       [x]                  NO    []                       OTHER:                                                                                                   PAIN SCALE:: 3

## 2024-03-25 NOTE — TELEPHONE ENCOUNTER
Called and spoke with BRIDGET White. Notified her that Vanessa Cruz has approved the requested home care orders noted below per Dr. Hoyt. She verbalized understanding & had no questions/concerns at this time.     EDDA CarrollN, RN   [facility]

## 2024-03-25 NOTE — NURSING NOTE
"Chief Complaint   Patient presents with    Follow Up     Liver transplant follow-up     Vital signs:  Temp: 98.5  F (36.9  C) Temp src: Oral BP: 114/80 Pulse: 86     SpO2: 99 %          Estimated body mass index is 30.16 kg/m  as calculated from the following:    Height as of 3/5/24: 1.73 m (5' 8.11\").    Weight as of 3/21/24: 90.3 kg (199 lb).      Dheeraj Cruz RN on 3/25/2024 at 11:13 AM    "

## 2024-03-25 NOTE — TELEPHONE ENCOUNTER
Routing to PCP to review and advise - Patient ok waiting till PCP is back in office.    Francisco Javier Miguel RN  [facility]

## 2024-03-25 NOTE — LETTER
3/25/2024         RE: Mary Anayarowan Lopez  [address]  [place] [address]        Dear Colleague,    Thank you for referring your patient, Mary Lopez, to the [facility]. Please see a copy of my visit note below.    Transplant Surgery -OUTPATIENT IMMUNOSUPPRESSION PROGRESS NOTE    Date of Visit: 03/25/2024    Transplants:  3/5/2024 (Liver); Postoperative day:  20  ASSESMENT AND PLAN:  1.Graft Function:Liver allograft: no rejection or technical problems.    2.Immunosuppression Management: keep tacrolimus levels at 10  3.Hypertension:ok  4.Renal Function:better  5.Lab frequency: twice weekly  6.Other:  Wound healthy, remove staples today    Date: March 25, 2024    Transplant:  [x]                             Liver [x]                              Kidney []                             Pancreas []                              Other:             Chief Complaint:Follow Up (Liver transplant follow-up)    Doing well, has low appetite  History of Present Illness:  Patient Active Problem List   Diagnosis     Primary hypertension     Tobacco abuse disorder     Mild hyperlipidemia     Morbid obesity (H)     Portal hypertensive gastropathy (H)     Secondary esophageal 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 (H)     Anemia due to unknown mechanism     Persistent insomnia     Bilateral leg edema     Hyponatremia     Alcohol use disorder, severe, in early remission (H)     Gastric varices     Severe malnutrition (H24)     Type 2 diabetes mellitus without complication, with long-term current use of insulin (H)     Generalized muscle weakness     Pulmonary HTN (H)     Hepatic encephalopathy (H)     ANGEL (acute kidney injury) (H24)     Sepsis with acute liver failure and septic shock without hepatic coma, due to unspecified organism (H)     Acute kidney failure, unspecified (H)     Delirium     Liver replaced by transplant (H)     Immunosuppressed status (H24)     Liver transplant rejection (H)      Acute post-operative pain     Atrial fibrillation with RVR (H)     Pleural effusion     Anemia due to blood loss, acute     Hypomagnesemia     Hypervolemia     SOCIAL /FAMILY HISTORY: [x]                  No recent change    Past Medical History:   Diagnosis Date     ANGEL (acute kidney injury) (H24)      Alcoholic cirrhosis of liver without ascites (H) 2023     Alcoholic hepatitis with ascites (H28) 10/03/2023     Alcoholic hepatitis without ascites (H28) 2023     Closed fracture of one rib of left side 2023     Concussion without loss of consciousness 2020     Decompensated hepatic cirrhosis (H) 09/15/2023     Diabetes mellitus, type 2 (H) 2023     Essential hypertension 2020     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)      Liver replaced by transplant (H) 2024     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    ACR PRAJAPATI 6-, treated with steroids     Mild hyperlipidemia 2021     Persistent insomnia 2023     Portal hypertension (H) 2023     Scrotal abscess      Secondary esophageal 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 (H) 2023     Tobacco abuse disorder 2020     Type 2 diabetes mellitus with hyperglycemia (H) 2023     Past Surgical History:   Procedure Laterality Date     BENCH LIVER  3/5/2024    Procedure: Bench liver;  Surgeon: Ryder Cortez MD;  Location:  OR     CHOLECYSTECTOMY       COLONOSCOPY N/A 2024    Procedure: COLONOSCOPY, WITH POLYPECTOMY;  Surgeon: Jak Urbina MD;  Location: [facility]     CV RIGHT HEART CATH MEASUREMENTS RECORDED N/A 2024    Procedure: Right Heart Catheterization;  Surgeon: Alfred Tafoya MD;  Location:  HEART CARDIAC CATH LAB     IR LIVER BIOPSY PERCUTANEOUS  3/15/2024     TONSILLECTOMY       TRANSPLANT LIVER RECIPIENT  DONOR N/A 3/5/2024    Procedure: Transplant liver recipient  donor, bile duct stent placement;  Surgeon: Ryder Cortez MD;  Location:   OR     VASECTOMY       Social History     Socioeconomic History     Marital status:      Spouse name: Not on file     Number of children: Not on file     Years of education: Not on file     Highest education level: Not on file   Occupational History     Occupation: Not working now   Tobacco Use     Smoking status: Former     Types: Cigarettes     Passive exposure: Never     Smokeless tobacco: Current     Types: Chew     Last attempt to quit: 1/1/2004     Tobacco comments:     Chew daily 1/3 of a tin per day   Vaping Use     Vaping Use: Never used   Substance and Sexual Activity     Alcohol use: Not Currently     Alcohol/week: 12.0 standard drinks of alcohol     Types: 12 Standard drinks or equivalent per week     Comment: Sober since 6/25/2023     Drug use: Not Currently     Sexual activity: Yes     Partners: Female     Birth control/protection: Male Surgical   Other Topics Concern     Parent/sibling w/ CABG, MI or angioplasty before 65F 55M? No   Social History Narrative     Not on file     Social Determinants of Health     Financial Resource Strain: Low Risk  (12/22/2023)    Financial Resource Strain      Within the past 12 months, have you or your family members you live with been unable to get utilities (heat, electricity) when it was really needed?: No   Food Insecurity: Low Risk  (12/22/2023)    Food Insecurity      Within the past 12 months, did you worry that your food would run out before you got money to buy more?: No      Within the past 12 months, did the food you bought just not last and you didn t have money to get more?: No   Transportation Needs: Low Risk  (12/22/2023)    Transportation Needs      Within the past 12 months, has lack of transportation kept you from medical appointments, getting your medicines, non-medical meetings or appointments, work, or from getting things that you need?: No   Physical Activity: Not on file   Stress: Not on file   Social Connections: Not on file    Interpersonal Safety: Low Risk  (2023)    Interpersonal Safety      Do you feel physically and emotionally safe where you currently live?: Yes      Within the past 12 months, have you been hit, slapped, kicked or otherwise physically hurt by someone?: No      Within the past 12 months, have you been humiliated or emotionally abused in other ways by your partner or ex-partner?: No   Housing Stability: Low Risk  (2023)    Housing Stability      Do you have housing? : Yes      Are you worried about losing your housing?: No     Prescription Medications as of 3/25/2024         Rx Number Disp Refills Start End Last Dispensed Date Next Fill Date Owning Pharmacy    acetaminophen (TYLENOL) 500 MG tablet  30 tablet 0 3/21/2024 --   [address]    Sig: Take 1-2 tablets (500-1,000 mg) by mouth every 6 hours as needed for mild pain    Class: E-Prescribe    Route: Oral    aspirin (ASA) 325 MG tablet  30 tablet 5 3/22/2024 --   [address]    Si tablet (325 mg) by Oral or Feeding Tube route daily    Class: E-Prescribe    Route: Oral or Feeding Tube    blood glucose (NO BRAND SPECIFIED) test strip  100 strip 6 10/24/2023 --   [facility][id number] [address]    Sig: Use to test blood sugar two times daily or as directed. To accompany: Blood Glucose Monitor Brands: per insurance.    Class: E-Prescribe    blood glucose monitoring (NO BRAND SPECIFIED) meter device kit  1 kit 0 10/24/2023 --   [facility] #[id number] - [facility] [address] [facility]    Sig: Use to test blood sugar twice times daily or as directed. Preferred blood glucose meter OR supplies to accompany: Blood Glucose Monitor Brands: per insurance.    Class: E-Prescribe    Continuous Blood Gluc Sensor (DEXCOM G7 SENSOR) MISC  3 each 5 3/21/2024 --    [address]    Sig: Change every 10 days.    Class: E-Prescribe    Continuous Blood Gluc Sensor (DEXCOM G7 SENSOR) MISC  3 each 5 2023 --   [id number] - [place] - [address]  AT [facility]    Sig: Change every 10 days.    Class: E-Prescribe    Glucagon (GVOKE HYPOPEN) 1 MG/0.2ML pen  0.4 mL 1 3/21/2024 --   [address]    Sig: Inject the contents of 1 device under the skin into lower abdomen, outer thigh, or outer upper arm as needed for hypoglycemia. If no response after 15 minutes, additional 1 mg dose from a new device may be injected while waiting for emergency assistance.    Class: E-Prescribe    Notes to Pharmacy: Replaces [id number] per insurance formulary    hydrOXYzine HCl (ATARAX) 25 MG tablet  20 tablet 0 3/21/2024 --   [address]    Si tablet (25 mg) by Oral or Feeding Tube route every 6 hours as needed for other (adjuvant pain)    Class: E-Prescribe    Route: Oral or Feeding Tube    insulin aspart (NOVOLOG PEN) 100 UNIT/ML pen  15 mL 1 3/21/2024 --   [address]    Sig: Inject 1-10 Units Subcutaneous 3 times daily (before meals) Humalog 4 units with meals plus correction scale 1:50 >140 TID AC and 1:50 >200 HS Pre-meal Humalog correction scale: Do Not give Correction Insulin if Pre-Meal BG less than 140. For Pre-Meal  - 189 give 1 unit. For Pre-Meal  - 239 give 2 units. For Pre-Meal  - 289 give 3 units. For Pre-Meal  - 339 give 4 units. For Pre-Meal - 399 give 5 units. For Pre-Meal -449 give 6 units For Pre-Meal BG greater than or equal to 450 give 7 units. To be given with prandial insulin, and based on pre-meal blood glucose. Bedtime Humalog correction scale: Do Not give Bedtime  Correction Insulin if BG less than 200. For  - 249 give 1 units. For  - 299 give 2 units. For  - 349 give 3 units. For  -399 give 4 units. For BG greater than or equal to 400 give 5 units. Notify provider if glucose greater than or equal to 350 mg/dL after administration of correction dose.    Class: E-Prescribe    Route: Subcutaneous    Insulin Glargine-yfgn (SEMGLEE, YFGN,) 100 UNIT/ML SOLN  10 mL 2 3/21/2024 --   [address]    Sig: Inject 23 Units Subcutaneous daily    Class: E-Prescribe    Route: Subcutaneous    insulin pen needle (32G X 4 MM) 32G X 4 MM miscellaneous  200 each 1 3/21/2024 --   [address]    Sig: Use as directed by provider Per insurance coverage    Class: E-Prescribe    insulin pen needle (BD PEN NEEDLE SHERRIE 2ND GEN) 32G X 4 MM miscellaneous  100 each 11 1/8/2024 --   [facility] #[id number] - [place], [place] - [address] [facility]    Sig: USES 5 PER DAY    Class: E-Prescribe    ketoconazole (NIZORAL) 200 MG tablet  30 tablet 2 3/22/2024 --   [address]    Sig: Take 0.5 tablets (100 mg) by mouth daily    Class: E-Prescribe    Route: Oral    magnesium oxide (MAG-OX) 400 MG tablet  120 tablet 5 3/21/2024 9/17/2024   [address]    Sig: Take 2 tablets (800 mg) by mouth 2 times daily for 180 days    Class: E-Prescribe    Route: Oral    melatonin 10 MG TABS tablet  -- -- 8/3/2023 --       Sig: Take 1 tablet (10 mg) by mouth nightly as needed for sleep    Class: OTC    Route: Oral    multivitamin w/minerals (THERA-VIT-M) tablet  90 tablet 1 2/2/2024 --   [facility] #[id number] - [place], [place] - [address] [facility]    Sig: TAKE 1 TABLET BY MOUTH DAILY    Class: E-Prescribe    Route: Oral     Renewals       Renewal provider: Jimmie Hoyt MD            mycophenolate (GENERIC EQUIVALENT) 250 MG capsule  180 capsule 11 3/21/2024 --   [address]    Sig: Take 3 capsules (750 mg) by mouth 2 times daily    Class: E-Prescribe    Route: Oral    nicotine (NICODERM CQ) 7 MG/24HR 24 hr patch  7 patch 3 3/21/2024 --   [address]    Sig: Place 1 patch onto the skin daily as needed for nicotine withdrawal symptoms    Class: E-Prescribe    Route: Transdermal    omeprazole (PRILOSEC) 20 MG DR capsule  180 capsule 1 8/3/2023 --       Sig: Take 1 capsule (20 mg) by mouth 2 times daily    Class: Historical    Route: Oral    polyethylene glycol (MIRALAX) 17 GM/Dose powder  510 g 1 3/21/2024 --   [address]    Sig: Take 17 g by mouth daily    Class: E-Prescribe    Route: Oral    predniSONE (DELTASONE) 20 MG tablet  7 tablet 0 3/22/2024 --   [address]    Sig: 3/22: 80 mg 3/23: 40 mg 324: 20 mg    Class: E-Prescribe    Notes to Pharmacy: 3/22: 80 mg 3/23: 40 mg 3/24: 20 mg    QUEtiapine (SEROQUEL) 25 MG tablet  30 tablet 0 3/21/2024 --   [address]    Sig: Take 1 tablet (25 mg) by mouth at bedtime    Class: E-Prescribe    Route: Oral    senna-docusate (SENOKOT-S/PERICOLACE) 8.6-50 MG tablet  60 tablet 1 3/21/2024 --   [address]    Si-2 tablets by Oral or Feeding Tube route 2 times daily as needed for constipation    Class: E-Prescribe    Route: Oral or Feeding Tube    sulfamethoxazole-trimethoprim (BACTRIM) 400-80 MG tablet  30 tablet 5 3/21/2024 --   [address]    Sig: Take 1 tablet by mouth every evening     Class: E-Prescribe    Route: Oral    tacrolimus (GENERIC EQUIVALENT) 1 MG capsule  360 capsule 11 3/21/2024 --   [address]    Sig: Take 6 capsules (6 mg) by mouth 2 times daily    Class: E-Prescribe    Route: Oral    thin (NO BRAND SPECIFIED) lancets  100 each 6 10/24/2023 --   [id number] - [place], [place] - [address] [place] [address] AT [facility]    Sig: Use with lanceting device. To accompany: Blood Glucose Monitor Brands: per insurance.    Class: E-Prescribe    ursodiol (ACTIGALL) 300 MG capsule  60 capsule 5 3/21/2024 --   [address]    Sig: Take 1 capsule (300 mg) by mouth 2 times daily    Class: E-Prescribe    Route: Oral    valGANciclovir (VALCYTE) 450 MG tablet  30 tablet 2 3/21/2024 --   [address]    Sig: Take 1 tablet (450 mg) by mouth every evening    Class: E-Prescribe    Route: Oral          Other food allergy and Pineapple   REVIEW OF SYSTEMS (check box if normal)  [x]               GENERAL  [x]                 PULMONARY [x]                GENITOURINARY  [x]                CNS                 [x]                 CARDIAC  [x]                 ENDOCRINE  [x]                EARS,NOSE,THROAT [x]                 GASTROINTESTINAL [x]                 NEUROLOGIC    [x]                MUSCLOSKELTAL  [x]                  HEMATOLOGY      PHYSICAL EXAM (check box if normal)/80 (BP Location: Right arm, Patient Position: Chair, Cuff Size: Adult Regular)   Pulse 86   Temp 98.5  F (36.9  C) (Oral)   Wt 89.9 kg (198 lb 1.6 oz)   SpO2 99%   BMI 30.02 kg/m          [x]            GENERAL:    [x]       EYES:  ICTERIC   []        YES  []                    NO  [x]           EXTREMITIES: Clubbing []       Y     [x]           N    [x]           EARS, NOSE, THROAT: Membranes Moist    YES   [x]                   NO []                   [x]           LUNGS:  CLEAR    YES       [x]                  NO    []                                [x]           SKIN: Jaundice           YES       []                  NO    [x]                   Rash: YES       []                  NO    [x]                                     [x]             HEART: Regular Rate          YES       [x]                  NO    []                   Incision Clean:  YES       [x]                  NO    []                                [x]                    ABDOMEN: Wound healthy Organomegaly YES       []                  NO    [x]                       [x]                    NEUROLOGICAL:  Nonfocal  YES       [x]                  NO    []                       [x]                    Hernia YES       []                  NO    [x]                   PSYCHIATRIC:  Appropriate  YES       [x]                  NO    []                       OTHER:                                                                                                   PAIN SCALE:: 3       Again, thank you for allowing me to participate in the care of your patient.        Sincerely,        Ryder Cortez MD

## 2024-03-25 NOTE — TELEPHONE ENCOUNTER
ISSUE:   Tacrolimus IR level 4.4 on 3/25, goal 10, dose 6 mg BID.    PLAN:   Called Adina.  She thought it was 6 mg total so she had only been giving Mary  3 mg po bid.   Verified new Tacrolimus IR dose.  Confirm no new medications or illness.   Confirm no missed doses.   Will increase Tacrolimus IR dose to 5 mg BID     Will repeat labs on 3/28, Thursday.

## 2024-03-26 ENCOUNTER — TELEPHONE (OUTPATIENT)
Dept: FAMILY MEDICINE | Facility: CLINIC | Age: 54
End: 2024-03-26
Payer: COMMERCIAL

## 2024-03-26 DIAGNOSIS — Z01.00 DIABETIC EYE EXAM (H): Primary | ICD-10-CM

## 2024-03-26 DIAGNOSIS — E11.9 DIABETIC EYE EXAM (H): Primary | ICD-10-CM

## 2024-03-26 NOTE — PROGRESS NOTES
HPI:  Patient presents for a diabetic eye exam. Blood sugar shoot up to ~700 10/2023. Vision is gradually decreasing at distance and near.       Pertinent Medical History:  Hepatic encephalopathy  Severe malnutrition  Liver transplant rejection  Type 2 diabetes mellitus  Hypertension  Pulmonary hypertension  Atrial fibrillation  Immunosuppressed  Kidney failure  Alcohol abuse disorder  S/P liver transplant. 03/05/2024  Alcoholic hepatitis    Ocular History:  Dry eye syndrome, both eyes.     Eye Medications:  None    Assessment and Plan:    #   No Diabetic Retinopathy, both eyes.   Macular OCT 04/10/2024  Last HA1C was 5.6 on 04/01/2024.   Goal is to keep HA1C under 7.0 to prevent blindness.   Recommend annual diabetic eye exam with macular OCT.      #   S/P Kidney Transplant. 03/05/2024  No ocular sequelae.     #   Myopia, both eyes.   Does not want progressives.   Distance only.  Reading only.   Glasses prescription given.     #   Cataract, both eyes.   Not visually significant.   Recommend more lighting for near work and reading.   Recommend UV protection.   Recommend annual dilated eye exam.              Patient consented to a dilated eye exam:    Yes. Side effects discussed.  Mood/affect: Patient would like warning prior to touching eyelids for eye exam.     Medical History:  Past Medical History:   Diagnosis Date    ANGEL (acute kidney injury) (H24)     Alcoholic cirrhosis of liver without ascites (H) 07/13/2023    Alcoholic hepatitis with ascites (H28) 10/03/2023    Alcoholic hepatitis without ascites (H28) 07/13/2023    Closed fracture of one rib of left side 09/14/2023    Concussion without loss of consciousness 03/11/2020    Decompensated hepatic cirrhosis (H) 09/15/2023    Diabetes mellitus, type 2 (H) 11/22/2023    Essential hypertension 03/11/2020    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)     Liver replaced by transplant (H) 03/05/2024    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    ACR  PRAJAPATI 6-7/9, treated with steroids    Mild hyperlipidemia 12/07/2021    Persistent insomnia 07/13/2023    Portal hypertension (H) 07/13/2023    Scrotal abscess     Secondary esophageal 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 (H) 07/13/2023    Tobacco abuse disorder 03/11/2020    Type 2 diabetes mellitus with hyperglycemia (H) 12/22/2023       Medications:  Current Outpatient Medications   Medication Sig Dispense Refill    acetaminophen (TYLENOL) 500 MG tablet Take 1-2 tablets (500-1,000 mg) by mouth every 6 hours as needed for mild pain 30 tablet 0    aspirin (ASA) 325 MG tablet 1 tablet (325 mg) by Oral or Feeding Tube route daily 30 tablet 5    blood glucose (NO BRAND SPECIFIED) test strip Use to test blood sugar two times daily or as directed. To accompany: Blood Glucose Monitor Brands: per insurance. 100 strip 6    blood glucose monitoring (NO BRAND SPECIFIED) meter device kit Use to test blood sugar twice times daily or as directed. Preferred blood glucose meter OR supplies to accompany: Blood Glucose Monitor Brands: per insurance. 1 kit 0    Continuous Blood Gluc Sensor (DEXCOM G7 SENSOR) MISC Change every 10 days. 3 each 5    Continuous Blood Gluc Sensor (DEXCOM G7 SENSOR) MISC Change every 10 days. 3 each 5    Glucagon (GVOKE HYPOPEN) 1 MG/0.2ML pen Inject the contents of 1 device under the skin into lower abdomen, outer thigh, or outer upper arm as needed for hypoglycemia. If no response after 15 minutes, additional 1 mg dose from a new device may be injected while waiting for emergency assistance. 0.4 mL 1    hydrOXYzine HCl (ATARAX) 25 MG tablet 1 tablet (25 mg) by Oral or Feeding Tube route every 6 hours as needed for other (adjuvant pain) 20 tablet 0    insulin aspart (NOVOLOG PEN) 100 UNIT/ML pen Inject 1-10 Units Subcutaneous 3 times daily (before meals) Humalog 4 units with meals plus correction scale 1:50 >140 TID AC and 1:50 >200 HS Pre-meal Humalog correction scale: Do Not give Correction Insulin if Pre-Meal BG  less than 140. For Pre-Meal  - 189 give 1 unit. For Pre-Meal  - 239 give 2 units. For Pre-Meal  - 289 give 3 units. For Pre-Meal  - 339 give 4 units. For Pre-Meal - 399 give 5 units. For Pre-Meal -449 give 6 units For Pre-Meal BG greater than or equal to 450 give 7 units. To be given with prandial insulin, and based on pre-meal blood glucose. Bedtime Humalog correction scale: Do Not give Bedtime Correction Insulin if BG less than 200. For  - 249 give 1 units. For  - 299 give 2 units. For  - 349 give 3 units. For  -399 give 4 units. For BG greater than or equal to 400 give 5 units. Notify provider if glucose greater than or equal to 350 mg/dL after administration of correction dose. 15 mL 1    Insulin Glargine-yfgn (SEMGLEE, YFGN,) 100 UNIT/ML SOLN Inject 23 Units Subcutaneous daily 10 mL 2    insulin pen needle (32G X 4 MM) 32G X 4 MM miscellaneous Use as directed by provider Per insurance coverage 200 each 1    insulin pen needle (BD PEN NEEDLE SHERRIE 2ND GEN) 32G X 4 MM miscellaneous USES 5 PER  each 11    ketoconazole (NIZORAL) 200 MG tablet Take 0.5 tablets (100 mg) by mouth daily 30 tablet 2    magnesium oxide (MAG-OX) 400 MG tablet Take 2 tablets (800 mg) by mouth 2 times daily for 180 days 120 tablet 5    melatonin 10 MG TABS tablet Take 1 tablet (10 mg) by mouth nightly as needed for sleep      multivitamin w/minerals (THERA-VIT-M) tablet TAKE 1 TABLET BY MOUTH DAILY 90 tablet 1    mycophenolate (GENERIC EQUIVALENT) 250 MG capsule Take 3 capsules (750 mg) by mouth 2 times daily 180 capsule 11    nicotine (NICODERM CQ) 7 MG/24HR 24 hr patch Place 1 patch onto the skin daily as needed for nicotine withdrawal symptoms 7 patch 3    omeprazole (PRILOSEC) 20 MG DR capsule Take 1 capsule (20 mg) by mouth 2 times daily 180 capsule 1    polyethylene glycol (MIRALAX) 17 GM/Dose powder Take 17 g by mouth daily 510 g 1    predniSONE (DELTASONE) 20 MG  tablet 3/22: 80 mg 3/23: 40 mg 3/24: 20 mg 7 tablet 0    QUEtiapine (SEROQUEL) 25 MG tablet Take 1 tablet (25 mg) by mouth at bedtime 30 tablet 0    senna-docusate (SENOKOT-S/PERICOLACE) 8.6-50 MG tablet 1-2 tablets by Oral or Feeding Tube route 2 times daily as needed for constipation 60 tablet 1    sulfamethoxazole-trimethoprim (BACTRIM) 400-80 MG tablet Take 1 tablet by mouth every evening 30 tablet 5    tacrolimus (GENERIC EQUIVALENT) 1 MG capsule Take 5 capsules (5 mg) by mouth 2 times daily 450 capsule 3    thin (NO BRAND SPECIFIED) lancets Use with lanceting device. To accompany: Blood Glucose Monitor Brands: per insurance. 100 each 6    ursodiol (ACTIGALL) 300 MG capsule Take 1 capsule (300 mg) by mouth 2 times daily 60 capsule 5    valGANciclovir (VALCYTE) 450 MG tablet Take 1 tablet (450 mg) by mouth every evening 30 tablet 2   Complete documentation of historical and exam elements from today's encounter can be found in the full encounter summary report (not reduplicated in this progress note). I personally obtained the chief complaint(s) and history of present illness.  I confirmed and edited as necessary the review of systems, past medical/surgical history, family history, social history, and examination findings as documented by others; and I examined the patient myself. I personally reviewed the relevant tests, images, and reports as documented above. I formulated and edited as necessary the assessment and plan and discussed the findings and management plan with the patient and family. - Shanta Jones OD

## 2024-03-26 NOTE — TELEPHONE ENCOUNTER
Forms/Letter Request    Type of form/letter: [facility] forms     Do we have the form/letter: Yes:     Who is the form from? Home care    Where did/will the form come from? form was faxed in    When is form/letter needed by: asap    How would you like the form/letter returned: Fax : [phone]

## 2024-03-27 ENCOUNTER — TELEPHONE (OUTPATIENT)
Dept: TRANSPLANT | Facility: CLINIC | Age: 54
End: 2024-03-27
Payer: COMMERCIAL

## 2024-03-27 NOTE — TELEPHONE ENCOUNTER
"Call to Adina to check in.  Mary is doing fine, he's forcing himself to eat.  He is drinking protein shakes.   A little nausea in the morning. Suggested making sure that he has some food in his stomach before he takes his morning meds.  Doing ok w/ his diabetes - they are doing diabetes ed on Friday, he is seeing his PCP on 4/10.  Some issues w/ sleep, told Adina this is normal.      Tapered off pred on Sunday. Vision seems \"off\".  He is getting a vision check in a week or 2 - I suggested waiting a bit.    PT coming this week    No other concerns or questions.   "

## 2024-03-28 ENCOUNTER — LAB (OUTPATIENT)
Dept: LAB | Facility: CLINIC | Age: 54
End: 2024-03-28
Payer: COMMERCIAL

## 2024-03-28 ENCOUNTER — MYC MEDICAL ADVICE (OUTPATIENT)
Dept: TRANSPLANT | Facility: CLINIC | Age: 54
End: 2024-03-28

## 2024-03-28 ENCOUNTER — VIRTUAL VISIT (OUTPATIENT)
Dept: PHARMACY | Facility: CLINIC | Age: 54
End: 2024-03-28
Payer: COMMERCIAL

## 2024-03-28 DIAGNOSIS — K59.00 CONSTIPATION, UNSPECIFIED CONSTIPATION TYPE: ICD-10-CM

## 2024-03-28 DIAGNOSIS — R11.0 NAUSEA: ICD-10-CM

## 2024-03-28 DIAGNOSIS — Z78.9 TAKES DIETARY SUPPLEMENTS: ICD-10-CM

## 2024-03-28 DIAGNOSIS — K21.9 GASTROESOPHAGEAL REFLUX DISEASE, UNSPECIFIED WHETHER ESOPHAGITIS PRESENT: ICD-10-CM

## 2024-03-28 DIAGNOSIS — Z94.4 LIVER REPLACED BY TRANSPLANT (H): Chronic | ICD-10-CM

## 2024-03-28 DIAGNOSIS — Z94.4 LIVER REPLACED BY TRANSPLANT (H): ICD-10-CM

## 2024-03-28 DIAGNOSIS — Z94.4 LIVER REPLACED BY TRANSPLANT (H): Primary | ICD-10-CM

## 2024-03-28 DIAGNOSIS — G47.00 INSOMNIA, UNSPECIFIED TYPE: ICD-10-CM

## 2024-03-28 DIAGNOSIS — Z94.4 LIVER TRANSPLANTED (H): Primary | ICD-10-CM

## 2024-03-28 DIAGNOSIS — Z79.4 TYPE 2 DIABETES MELLITUS WITHOUT COMPLICATION, WITH LONG-TERM CURRENT USE OF INSULIN (H): Chronic | ICD-10-CM

## 2024-03-28 DIAGNOSIS — E83.42 HYPOMAGNESEMIA: Primary | ICD-10-CM

## 2024-03-28 DIAGNOSIS — E11.9 TYPE 2 DIABETES MELLITUS WITHOUT COMPLICATION, WITH LONG-TERM CURRENT USE OF INSULIN (H): Chronic | ICD-10-CM

## 2024-03-28 LAB
ALBUMIN SERPL BCG-MCNC: 3.5 G/DL (ref 3.5–5.2)
ALP SERPL-CCNC: 203 U/L (ref 40–150)
ALT SERPL W P-5'-P-CCNC: 35 U/L (ref 0–70)
ANION GAP SERPL CALCULATED.3IONS-SCNC: 7 MMOL/L (ref 7–15)
AST SERPL W P-5'-P-CCNC: 27 U/L (ref 0–45)
BILIRUB DIRECT SERPL-MCNC: 0.66 MG/DL (ref 0–0.3)
BILIRUB SERPL-MCNC: 1.2 MG/DL
BUN SERPL-MCNC: 15.3 MG/DL (ref 6–20)
CALCIUM SERPL-MCNC: 9.9 MG/DL (ref 8.6–10)
CHLORIDE SERPL-SCNC: 104 MMOL/L (ref 98–107)
CREAT SERPL-MCNC: 1.2 MG/DL (ref 0.67–1.17)
DEPRECATED HCO3 PLAS-SCNC: 24 MMOL/L (ref 22–29)
EGFRCR SERPLBLD CKD-EPI 2021: 73 ML/MIN/1.73M2
ERYTHROCYTE [DISTWIDTH] IN BLOOD BY AUTOMATED COUNT: 17.1 % (ref 10–15)
GLUCOSE SERPL-MCNC: 164 MG/DL (ref 70–99)
HCT VFR BLD AUTO: 36.9 % (ref 40–53)
HGB BLD-MCNC: 11.9 G/DL (ref 13.3–17.7)
LABORATORY REPORT: NORMAL
MAGNESIUM SERPL-MCNC: 1.3 MG/DL (ref 1.7–2.3)
MCH RBC QN AUTO: 31.6 PG (ref 26.5–33)
MCHC RBC AUTO-ENTMCNC: 32.2 G/DL (ref 31.5–36.5)
MCV RBC AUTO: 98 FL (ref 78–100)
PETH INTERPRETATION: NORMAL
PHOSPHATE SERPL-MCNC: 4.5 MG/DL (ref 2.5–4.5)
PLATELET # BLD AUTO: 357 10E3/UL (ref 150–450)
PLPETH BLD-MCNC: <10 NG/ML
POPETH BLD-MCNC: <10 NG/ML
POTASSIUM SERPL-SCNC: 5.6 MMOL/L (ref 3.4–5.3)
PROT SERPL-MCNC: 6.5 G/DL (ref 6.4–8.3)
RBC # BLD AUTO: 3.77 10E6/UL (ref 4.4–5.9)
SODIUM SERPL-SCNC: 135 MMOL/L (ref 135–145)
TACROLIMUS BLD-MCNC: 10.8 UG/L (ref 5–15)
TME LAST DOSE: NORMAL H
TME LAST DOSE: NORMAL H
WBC # BLD AUTO: 7.3 10E3/UL (ref 4–11)

## 2024-03-28 PROCEDURE — 82248 BILIRUBIN DIRECT: CPT

## 2024-03-28 PROCEDURE — 80053 COMPREHEN METABOLIC PANEL: CPT

## 2024-03-28 PROCEDURE — 83735 ASSAY OF MAGNESIUM: CPT

## 2024-03-28 PROCEDURE — 36415 COLL VENOUS BLD VENIPUNCTURE: CPT

## 2024-03-28 PROCEDURE — 85027 COMPLETE CBC AUTOMATED: CPT

## 2024-03-28 PROCEDURE — 80197 ASSAY OF TACROLIMUS: CPT

## 2024-03-28 PROCEDURE — 99605 MTMS BY PHARM NP 15 MIN: CPT | Mod: 93 | Performed by: PHARMACIST

## 2024-03-28 PROCEDURE — 84100 ASSAY OF PHOSPHORUS: CPT

## 2024-03-28 PROCEDURE — 99607 MTMS BY PHARM ADDL 15 MIN: CPT | Mod: 93 | Performed by: PHARMACIST

## 2024-03-28 RX ORDER — MAGNESIUM OXIDE 400 MG/1
800 TABLET ORAL 3 TIMES DAILY
Qty: 180 TABLET | Refills: 1 | Status: ON HOLD | OUTPATIENT
Start: 2024-03-28 | End: 2024-03-31

## 2024-03-28 RX ORDER — PREDNISONE 5 MG/1
5 TABLET ORAL DAILY
Status: ON HOLD | COMMUNITY
End: 2024-03-31

## 2024-03-28 RX ORDER — MAGNESIUM GLYCINATE 100 MG
200 CAPSULE ORAL 3 TIMES DAILY
Qty: 540 CAPSULE | Refills: 2 | Status: ON HOLD | OUTPATIENT
Start: 2024-03-28 | End: 2024-04-02

## 2024-03-28 RX ORDER — PREDNISONE 5 MG/1
5 TABLET ORAL DAILY
Qty: 90 TABLET | Refills: 3 | Status: SHIPPED | OUTPATIENT
Start: 2024-03-28 | End: 2024-06-13

## 2024-03-28 RX ORDER — ONDANSETRON 4 MG/1
4 TABLET, FILM COATED ORAL EVERY 8 HOURS PRN
Qty: 20 TABLET | Refills: 0 | Status: ON HOLD | OUTPATIENT
Start: 2024-03-28 | End: 2024-03-31

## 2024-03-28 NOTE — PATIENT INSTRUCTIONS
"Recommendations from today's MTM visit:                                                      Recommend follow-up with Dr. Hoyt and referral to sleep clinic. Can try Hydroxyzine for sleep.   Try taking Omeprazole 20mg 30-60 minutes before meals, it will work better when taken this way. Let us know if heartburn does not improve.   Give Miralax today, continue daily. If needed can add Senna in.   Start Magnesium Glycinate 200mg (2 tablets) 3 TIMES DAILY. Stop Mag oxide when you get this.   [facility] mail order will call you three weeks post discharge, but if your dose changes, call the number on the back of the pill box to talk to them earlier, [phone]. If your doctor sends over a new prescription to the mail order pharmacy you will have to call them to set up the order. They have an Moncho called \"My [facility]\" as well.     Follow-up: 6/26    It was great speaking with you today.  I value your experience and would be very thankful for your time in providing feedback in our clinic survey. In the next few days, you may receive an email or text message from [employer] with a link to a survey related to your  clinical pharmacist.\"     To schedule another MTM appointment, please call the clinic directly or you may call the MTM scheduling line at [phone] or toll-free at [phone].     My Clinical Pharmacist's contact information:                                                      Please feel free to contact me with any questions or concerns you have.      Fredy Osborn, PharmZHOU  MTM Pharmacist    Phone: [phone]     "

## 2024-03-28 NOTE — TELEPHONE ENCOUNTER
Post Liver Transplant Team Conference  Date: 3/28/2024  Transplant Coordinator: Lore Mckeon  Transplant Surgeon: Ryder Cortez      Attendees:  [] Dr. Garcia [x] Zoe Mckenzie, RN []       [x] Dr. Cortez [x] Valentina Enamorado LPN []    [] Dr. Sharp [x] Evelyn Don RN []    [] Dr. Goldman [] Vicki Mccord, PIYUSH []    [] DR. Cash [] Vicki Bruno, BRIDGET []       [] Dr. Bailey [x] Judy Galeana RN []       [] Dr. Flavio Childs [x] Lore Mckeon, BRIDGET []       [] Dr. KRISTIN Barrett [x] Isabella Mortensen RN []       [] Fredy Osborn, pharm [] Dora Herrera RN []       [] Ying Bowling NP [] Amadeo Martinez RN []          Verbal Plan Read Back:   Dr. Cortez would like Mary to stay on prednisone 5 mg daily due to recent rejection.     Routed to RN Coordinator   Lore Mckeon, RN     Left a message for Thea asking to have pt stay on prednisone 5mg daily due to recent rejection and confirm. Script sent to [facility].

## 2024-03-28 NOTE — Clinical Note
Pt is requesting a higher dose of Quetiapine for sleep.   I told him long term to get a referral to the insomnia clinic from PCP.

## 2024-03-28 NOTE — PROGRESS NOTES
"Medication Therapy Management (MTM) Encounter    ASSESSMENT:                            Medication Adherence/Access: No issues identified    Liver Transplant:    Stable.    Supplements:   See nausea section.     GERD    Having daily GERD sx, recommended he move omeprazole dosing to 30-60 minutes before meals to improve sx.      Diabetes   Blood sugars a little elevated, but do not have exact numbers today. Patient will be seeing endocrine tomorrow for management. No changes today by MTM.     Insomnia:   Patient has long standing history of Insomnia, discussed this would be best managed by PCP/ insomnia specialist. I will ask Txp team if they will increase his Quetiapine dose to see if this helps.     Constipation:   Pt instructed to start laxatives     Nausea:   Will changes magnesium oxide to a better absorbed formulation which should cause less GI upset.     PLAN:                            Dr. Cortez...  Patient is requesting a higher dose of Quetiapine.     Pt to...  Recommend follow-up with Dr. Hoyt and referral to sleep clinic. Can try Hydroxyzine for sleep.   Try taking Omeprazole 20mg 30-60 minutes before meals, it will work better when taken this way. Let us know if heartburn does not improve.   Give Miralax today, continue daily. If needed can add Senna in.   Start Magnesium Glycinate 200mg (2 tablets) 3 TIMES DAILY. Stop Mag oxide when you get this.   [facility] mail order will call you three weeks post discharge, but if your dose changes, call the number on the back of the pill box to talk to them earlier, [phone]. If your doctor sends over a new prescription to the mail order pharmacy you will have to call them to set up the order. They have an Mon[facility] called \"My [facility] RX\" as well.     Follow-up: 6/26    SUBJECTIVE/OBJECTIVE:                          Mary Lopez is a 53 year old male called for a transitions of care visit. He was discharged from [facility] on 3/21 for Liver txp. Patient was " accompanied by Rosio.     Reason for visit: initial post transplant.    Allergies/ADRs: Reviewed in chart  Past Medical History: Reviewed in chart  Tobacco: He reports that he has quit smoking. His smoking use included cigarettes. He has never been exposed to tobacco smoke. His smokeless tobacco use includes chew.  Alcohol: not currently using  THC/CBD: none    Medication Adherence/Access: Patient uses pill box(es) and has assistance with medication administration: family member, Rosio.   Patient takes medications 3 time(s) per day. Txp meds at 7am and 7pm  Per patient, misses medication 1 times per week. Missed one Tacrolimus in the beginning, but this was corrected.   The patient fills medications at [place]: YES.    Liver Transplant:    Tacrolimus 5 mg twice daily  Mycophenolate Mofetil 750 mg twice daily.   Prednisone 5mg daily restarted due to rejection.   Tacrolimus booster: Ketoconazole 100mg daily.   Pt reports Insomnia  Transplant date: 3/5/24  Vascular Prophylaxis: Aspirin 325mg daily. Denies gastrointestinal bleed sx.   Other meds: Ursodiol 300mg twice daily.   CMV prophylaxis: CrCl >40 mL/minute: Valcyte 450 mg once daily Treat 3 months post tx.   Valcyte birth defects discussed: Yes   PJP prophylaxis: Bactrim SS daily for 6 months post txp  Tx Coordinator: Elvin Mayorga MD: Dr. Cortez, Dr. Muniz, Using Med Card: Yes  Immunizations: annual flu shot 2023; [id number]:  NA; Prevnar 20: 2023; TDaP:  2017; Shingrix: x2, HBV: no immunity, COVID: Primary x 3 and 23-24 2023    Estimated Creatinine Clearance: 78.6 mL/min (A) (based on SCr of 1.2 mg/dL (H)).    Supplements:   Mag Oxide 800mg 3 TIMES DAILY.  MVI daily  Lab Results   Component Value Date    MAG 1.3 (L) 03/28/2024    MAG 1.5 (L) 03/25/2024    MAG 1.8 03/21/2024     GERD    Omeprazole 20 mg twice daily   Patient reports heartburn. Moderate sx 1 minute after eating.   Patient feels that current regimen is not effective.     Diabetes    Semglee 23 units daily  Humalog 4 units + sliding scale over 140, 1 units:50.   Patient is not experiencing side effects.  Blood sugar monitoring: Continuous Glucose Monitor Dexcom 150-200 per patient.  Current diabetes symptoms: Low sugars last night, down to 70.   Urine Albumin:   Lab Results   Component Value Date    UMALCR 16.01 03/12/2022      Lab Results   Component Value Date    A1C 4.8 02/25/2024     Insomnia:   Quetiapine 25mg at bedtime  Melatonin 10mg at bedtime prn.   Has tried Trazodone, Doxepin in the past.   This issue predates transplant  Patient reports sleeps 2-3 hours a night. Takes 10 minute naps during the day.     Constipation:   Has not been using Miralax 17g or Senna.   Having 1 BM daily, but very small.     Nausea:   Patient having some nausea after taking magnesium. Denies vomiting. Just started Ondansetron 4mg daily.      Today's Vitals: There were no vitals taken for this visit.  ----------------  Post Discharge Medication Reconciliation Status: discharge medications reconciled and changed, per note/orders.    I spent 35 minutes with this patient today. All changes were made via collaborative practice agreement with Dr. Cortez. A copy of the visit note was provided to the patient's provider(s).    A summary of these recommendations was sent via [employer].    Fredy Osborn, PharmD  [facility] Pharmacist    Phone: [phone]     Telemedicine Visit Details  Type of service:  Telephone visit  Start Time: 11:35 AM  End Time: 12:10 PM     Medication Therapy Recommendations  Constipation, unspecified constipation type    Rationale: Untreated condition - Needs additional medication therapy - Indication   Recommendation: Start Medication - MiraLax 17 g Pack   Status: Accepted - no CPA Needed         Gastroesophageal reflux disease, unspecified whether esophagitis present    Current Medication: omeprazole (PRILOSEC) 20 MG DR capsule   Rationale: Incorrect administration - Dosage too low -  Effectiveness   Recommendation: Change Administration Time   Status: Accepted - no CPA Needed         Insomnia, unspecified type    Current Medication: QUEtiapine (SEROQUEL) 25 MG tablet   Rationale: Dose too low - Dosage too low - Effectiveness   Recommendation: Increase Dose   Status: Contact Provider - Awaiting Response         Takes dietary supplements    Current Medication: magnesium oxide (MAG-OX) 400 MG tablet   Rationale: Undesirable effect - Adverse medication event - Safety   Recommendation: Change Medication Formulation  - magnesium glycinate 100 MG Caps capsule   Status: Accepted per CPA

## 2024-03-28 NOTE — TELEPHONE ENCOUNTER
Mag 1.3 and potassium 5.6. Spoke to Adina about avoiding high potassium foods.  Call to Adina.  Mary is on Mag 800 bid.  She will increase oral mag to tid.  Adina will call us if she feels IV replacement would be a better optioin.

## 2024-03-29 ENCOUNTER — TELEPHONE (OUTPATIENT)
Dept: ENDOCRINOLOGY | Facility: CLINIC | Age: 54
End: 2024-03-29

## 2024-03-29 ENCOUNTER — VIRTUAL VISIT (OUTPATIENT)
Dept: EDUCATION SERVICES | Facility: CLINIC | Age: 54
End: 2024-03-29
Payer: COMMERCIAL

## 2024-03-29 DIAGNOSIS — E11.9 TYPE 2 DIABETES MELLITUS WITHOUT COMPLICATION, WITH LONG-TERM CURRENT USE OF INSULIN (H): Primary | Chronic | ICD-10-CM

## 2024-03-29 DIAGNOSIS — Z79.4 TYPE 2 DIABETES MELLITUS WITHOUT COMPLICATION, WITH LONG-TERM CURRENT USE OF INSULIN (H): Primary | Chronic | ICD-10-CM

## 2024-03-29 PROCEDURE — G0108 DIAB MANAGE TRN  PER INDIV: HCPCS | Mod: 95 | Performed by: NUTRITIONIST

## 2024-03-29 NOTE — TELEPHONE ENCOUNTER
Left Voicemail (1st Attempt) and Sent Mychart (1st Attempt) for the patient to call back and schedule the following:    Appointment type: 1 month follow up  Provider: Any Diabetes PA  Return date: around beginning of May  Specialty phone number: [phone]  Additional appointment(s) needed:   Additonal Notes:

## 2024-03-29 NOTE — NURSING NOTE
Is the patient currently in the state of [place]? YES    Visit mode:VIDEO    If the visit is dropped, the patient can be reconnected by: VIDEO VISIT: Text to cell phone:   Telephone Information:   Mobile [phone]       Will anyone else be joining the visit? Yes spouse joining with patient   (If patient encounters technical issues they should call [phone]    How would you like to obtain your AVS? MyChart    Are changes needed to the allergy or medication list? Spouse reported no changes when asked about e-check in information for visit. VF did not review e-check in information again with spouse due to this.     Reason for visit: RECHECK    Autumn CHRISTY

## 2024-03-30 ENCOUNTER — APPOINTMENT (OUTPATIENT)
Dept: ULTRASOUND IMAGING | Facility: CLINIC | Age: 54
DRG: 920 | End: 2024-03-30
Attending: EMERGENCY MEDICINE
Payer: COMMERCIAL

## 2024-03-30 ENCOUNTER — APPOINTMENT (OUTPATIENT)
Dept: CT IMAGING | Facility: CLINIC | Age: 54
DRG: 920 | End: 2024-03-30
Attending: EMERGENCY MEDICINE
Payer: COMMERCIAL

## 2024-03-30 ENCOUNTER — TELEPHONE (OUTPATIENT)
Dept: TRANSPLANT | Facility: CLINIC | Age: 54
End: 2024-03-30
Payer: COMMERCIAL

## 2024-03-30 ENCOUNTER — HOSPITAL ENCOUNTER (INPATIENT)
Facility: CLINIC | Age: 54
LOS: 2 days | Discharge: HOME HEALTH | DRG: 920 | End: 2024-04-02
Attending: EMERGENCY MEDICINE | Admitting: TRANSPLANT SURGERY
Payer: COMMERCIAL

## 2024-03-30 DIAGNOSIS — R10.9 ABDOMINAL PAIN, UNSPECIFIED ABDOMINAL LOCATION: ICD-10-CM

## 2024-03-30 DIAGNOSIS — Z94.4 LIVER REPLACED BY TRANSPLANT (H): Chronic | ICD-10-CM

## 2024-03-30 DIAGNOSIS — R10.31 RLQ ABDOMINAL PAIN: Primary | ICD-10-CM

## 2024-03-30 LAB
ALBUMIN SERPL BCG-MCNC: 3.5 G/DL (ref 3.5–5.2)
ALBUMIN UR-MCNC: NEGATIVE MG/DL
ALP SERPL-CCNC: 191 U/L (ref 40–150)
ALT SERPL W P-5'-P-CCNC: 23 U/L (ref 0–70)
ANION GAP SERPL CALCULATED.3IONS-SCNC: 9 MMOL/L (ref 7–15)
APPEARANCE UR: CLEAR
AST SERPL W P-5'-P-CCNC: 19 U/L (ref 0–45)
BASOPHILS # BLD AUTO: 0.1 10E3/UL (ref 0–0.2)
BASOPHILS NFR BLD AUTO: 1 %
BILIRUB SERPL-MCNC: 1 MG/DL
BILIRUB UR QL STRIP: NEGATIVE
BUN SERPL-MCNC: 34.3 MG/DL (ref 6–20)
CALCIUM SERPL-MCNC: 9.5 MG/DL (ref 8.6–10)
CHLORIDE SERPL-SCNC: 104 MMOL/L (ref 98–107)
COLOR UR AUTO: YELLOW
CREAT SERPL-MCNC: 2 MG/DL (ref 0.67–1.17)
DEPRECATED HCO3 PLAS-SCNC: 19 MMOL/L (ref 22–29)
EGFRCR SERPLBLD CKD-EPI 2021: 39 ML/MIN/1.73M2
EOSINOPHIL # BLD AUTO: 0.1 10E3/UL (ref 0–0.7)
EOSINOPHIL NFR BLD AUTO: 2 %
ERYTHROCYTE [DISTWIDTH] IN BLOOD BY AUTOMATED COUNT: 16.7 % (ref 10–15)
GLUCOSE SERPL-MCNC: 203 MG/DL (ref 70–99)
GLUCOSE UR STRIP-MCNC: NEGATIVE MG/DL
HCT VFR BLD AUTO: 35.4 % (ref 40–53)
HGB BLD-MCNC: 11.3 G/DL (ref 13.3–17.7)
HGB UR QL STRIP: NEGATIVE
HYALINE CASTS: 16 /LPF
IMM GRANULOCYTES # BLD: 0 10E3/UL
IMM GRANULOCYTES NFR BLD: 1 %
KETONES UR STRIP-MCNC: NEGATIVE MG/DL
LACTATE SERPL-SCNC: 0.8 MMOL/L (ref 0.7–2)
LEUKOCYTE ESTERASE UR QL STRIP: NEGATIVE
LIPASE SERPL-CCNC: 5 U/L (ref 13–60)
LYMPHOCYTES # BLD AUTO: 0.8 10E3/UL (ref 0.8–5.3)
LYMPHOCYTES NFR BLD AUTO: 10 %
MCH RBC QN AUTO: 31 PG (ref 26.5–33)
MCHC RBC AUTO-ENTMCNC: 31.9 G/DL (ref 31.5–36.5)
MCV RBC AUTO: 97 FL (ref 78–100)
MONOCYTES # BLD AUTO: 0.8 10E3/UL (ref 0–1.3)
MONOCYTES NFR BLD AUTO: 10 %
MUCOUS THREADS #/AREA URNS LPF: PRESENT /LPF
NEUTROPHILS # BLD AUTO: 6.4 10E3/UL (ref 1.6–8.3)
NEUTROPHILS NFR BLD AUTO: 76 %
NITRATE UR QL: NEGATIVE
NRBC # BLD AUTO: 0 10E3/UL
NRBC BLD AUTO-RTO: 0 /100
PH UR STRIP: 5 [PH] (ref 5–7)
PLATELET # BLD AUTO: 283 10E3/UL (ref 150–450)
POTASSIUM SERPL-SCNC: 6.6 MMOL/L (ref 3.4–5.3)
PROT SERPL-MCNC: 6.7 G/DL (ref 6.4–8.3)
RBC # BLD AUTO: 3.65 10E6/UL (ref 4.4–5.9)
RBC URINE: 0 /HPF
SODIUM SERPL-SCNC: 132 MMOL/L (ref 135–145)
SP GR UR STRIP: 1.02 (ref 1–1.03)
UROBILINOGEN UR STRIP-MCNC: NORMAL MG/DL
WBC # BLD AUTO: 8.3 10E3/UL (ref 4–11)
WBC URINE: 1 /HPF

## 2024-03-30 PROCEDURE — 96375 TX/PRO/DX INJ NEW DRUG ADDON: CPT | Performed by: EMERGENCY MEDICINE

## 2024-03-30 PROCEDURE — 36415 COLL VENOUS BLD VENIPUNCTURE: CPT | Performed by: EMERGENCY MEDICINE

## 2024-03-30 PROCEDURE — 99285 EMERGENCY DEPT VISIT HI MDM: CPT | Mod: 25 | Performed by: EMERGENCY MEDICINE

## 2024-03-30 PROCEDURE — 250N000011 HC RX IP 250 OP 636: Performed by: EMERGENCY MEDICINE

## 2024-03-30 PROCEDURE — 81001 URINALYSIS AUTO W/SCOPE: CPT | Performed by: EMERGENCY MEDICINE

## 2024-03-30 PROCEDURE — 96361 HYDRATE IV INFUSION ADD-ON: CPT | Performed by: EMERGENCY MEDICINE

## 2024-03-30 PROCEDURE — 83690 ASSAY OF LIPASE: CPT | Performed by: EMERGENCY MEDICINE

## 2024-03-30 PROCEDURE — 258N000003 HC RX IP 258 OP 636: Performed by: EMERGENCY MEDICINE

## 2024-03-30 PROCEDURE — 93975 VASCULAR STUDY: CPT | Mod: 26 | Performed by: RADIOLOGY

## 2024-03-30 PROCEDURE — 999N000248 HC STATISTIC IV INSERT WITH US BY RN

## 2024-03-30 PROCEDURE — 74177 CT ABD & PELVIS W/CONTRAST: CPT

## 2024-03-30 PROCEDURE — 99285 EMERGENCY DEPT VISIT HI MDM: CPT | Performed by: EMERGENCY MEDICINE

## 2024-03-30 PROCEDURE — 85025 COMPLETE CBC W/AUTO DIFF WBC: CPT | Performed by: EMERGENCY MEDICINE

## 2024-03-30 PROCEDURE — 250N000013 HC RX MED GY IP 250 OP 250 PS 637: Performed by: EMERGENCY MEDICINE

## 2024-03-30 PROCEDURE — 83605 ASSAY OF LACTIC ACID: CPT | Performed by: EMERGENCY MEDICINE

## 2024-03-30 PROCEDURE — 93975 VASCULAR STUDY: CPT

## 2024-03-30 PROCEDURE — 999N000285 HC STATISTIC VASC ACCESS LAB DRAW WITH PIV START

## 2024-03-30 PROCEDURE — 99024 POSTOP FOLLOW-UP VISIT: CPT | Performed by: TRANSPLANT SURGERY

## 2024-03-30 PROCEDURE — 74177 CT ABD & PELVIS W/CONTRAST: CPT | Mod: 26 | Performed by: RADIOLOGY

## 2024-03-30 PROCEDURE — 80053 COMPREHEN METABOLIC PANEL: CPT | Performed by: EMERGENCY MEDICINE

## 2024-03-30 RX ORDER — OXYCODONE HYDROCHLORIDE 5 MG/1
5 TABLET ORAL ONCE
Status: COMPLETED | OUTPATIENT
Start: 2024-03-30 | End: 2024-03-30

## 2024-03-30 RX ORDER — IOPAMIDOL 755 MG/ML
122 INJECTION, SOLUTION INTRAVASCULAR ONCE
Status: COMPLETED | OUTPATIENT
Start: 2024-03-30 | End: 2024-03-30

## 2024-03-30 RX ORDER — DEXTROSE MONOHYDRATE 25 G/50ML
25 INJECTION, SOLUTION INTRAVENOUS ONCE
Status: COMPLETED | OUTPATIENT
Start: 2024-03-30 | End: 2024-03-31

## 2024-03-30 RX ORDER — HYDROMORPHONE HYDROCHLORIDE 1 MG/ML
0.5 INJECTION, SOLUTION INTRAMUSCULAR; INTRAVENOUS; SUBCUTANEOUS
Status: DISCONTINUED | OUTPATIENT
Start: 2024-03-30 | End: 2024-03-31

## 2024-03-30 RX ORDER — ACETAMINOPHEN 500 MG
1000 TABLET ORAL ONCE
Status: COMPLETED | OUTPATIENT
Start: 2024-03-30 | End: 2024-03-30

## 2024-03-30 RX ORDER — DEXTROSE MONOHYDRATE 25 G/50ML
25-50 INJECTION, SOLUTION INTRAVENOUS
Status: DISCONTINUED | OUTPATIENT
Start: 2024-03-30 | End: 2024-04-02 | Stop reason: HOSPADM

## 2024-03-30 RX ORDER — IBUPROFEN 600 MG/1
600 TABLET, FILM COATED ORAL ONCE
Status: COMPLETED | OUTPATIENT
Start: 2024-03-30 | End: 2024-03-30

## 2024-03-30 RX ORDER — NICOTINE POLACRILEX 4 MG
15-30 LOZENGE BUCCAL
Status: DISCONTINUED | OUTPATIENT
Start: 2024-03-30 | End: 2024-04-02 | Stop reason: HOSPADM

## 2024-03-30 RX ADMIN — IOPAMIDOL 122 ML: 755 INJECTION, SOLUTION INTRAVENOUS at 23:08

## 2024-03-30 RX ADMIN — SODIUM CHLORIDE 1000 ML: 9 INJECTION, SOLUTION INTRAVENOUS at 23:37

## 2024-03-30 RX ADMIN — OXYCODONE HYDROCHLORIDE 5 MG: 5 TABLET ORAL at 22:40

## 2024-03-30 RX ADMIN — IBUPROFEN 600 MG: 600 TABLET, FILM COATED ORAL at 22:40

## 2024-03-30 RX ADMIN — ACETAMINOPHEN 1000 MG: 500 TABLET ORAL at 22:40

## 2024-03-30 ASSESSMENT — COLUMBIA-SUICIDE SEVERITY RATING SCALE - C-SSRS
1. IN THE PAST MONTH, HAVE YOU WISHED YOU WERE DEAD OR WISHED YOU COULD GO TO SLEEP AND NOT WAKE UP?: NO
6. HAVE YOU EVER DONE ANYTHING, STARTED TO DO ANYTHING, OR PREPARED TO DO ANYTHING TO END YOUR LIFE?: NO
2. HAVE YOU ACTUALLY HAD ANY THOUGHTS OF KILLING YOURSELF IN THE PAST MONTH?: NO

## 2024-03-30 ASSESSMENT — ACTIVITIES OF DAILY LIVING (ADL)
ADLS_ACUITY_SCORE: 36
ADLS_ACUITY_SCORE: 38

## 2024-03-30 NOTE — LETTER
[facility] [address] [address]  [phone]    FACSIMILE TRANSMITTAL SHEET    TO: Seema Rodas    _____URGENT _____REVIEW ONLY _____PLEASE COMMENT____PLEASE REPLY    NOTES/COMMENTS: Attached please find final discharge orders for Mary Lopez.    Ora Perry, RN BSN, PHN, ACM-RN  2/12/2024  Nurse Coordinator    Phone: [phone]    Social Work and Care Management Department         4/2/2024                                                  IF YOU DID NOT RECEIVE THE CORRECT NUMBER OF PAGES OR THE FAX DID NOT COME THROUGH CLEARLY, PLEASE CALL THE SENDER     CONFIDENTIALITY STATEMENT: Confidential information that may accompany this transmission contains protected health information under state and federal law and is legally privileged. This information is intended only for the use of the individual or entity named above and may be used only for carrying out treatment, payment or other healthcare operations. The recipient or person responsible for delivering this information is prohibited by law from disclosing this information without proper authorization to any other party, unless required to do so by law or regulation. If you are not the intended recipient, you are hereby notified that any review, dissemination, distribution, or copying of this message is strictly prohibited. If you have received this communication in error, please destroy the materials and contact us immediately by calling the number listed above. No response indicates that the information was received by the appropriate authorized party

## 2024-03-31 ENCOUNTER — APPOINTMENT (OUTPATIENT)
Dept: GENERAL RADIOLOGY | Facility: CLINIC | Age: 54
DRG: 920 | End: 2024-03-31
Payer: COMMERCIAL

## 2024-03-31 ENCOUNTER — MYC MEDICAL ADVICE (OUTPATIENT)
Dept: TRANSPLANT | Facility: CLINIC | Age: 54
End: 2024-03-31

## 2024-03-31 PROBLEM — R10.9 UNSPECIFIED ABDOMINAL PAIN: Status: ACTIVE | Noted: 2024-03-31

## 2024-03-31 LAB
ALBUMIN SERPL BCG-MCNC: 2.8 G/DL (ref 3.5–5.2)
ALP SERPL-CCNC: 155 U/L (ref 40–150)
ALT SERPL W P-5'-P-CCNC: 13 U/L (ref 0–70)
ANION GAP SERPL CALCULATED.3IONS-SCNC: 7 MMOL/L (ref 7–15)
ANION GAP SERPL CALCULATED.3IONS-SCNC: 9 MMOL/L (ref 7–15)
APTT PPP: 33 SECONDS (ref 22–38)
AST SERPL W P-5'-P-CCNC: 17 U/L (ref 0–45)
ATRIAL RATE - MUSE: 79 BPM
BILIRUB SERPL-MCNC: 0.8 MG/DL
BUN SERPL-MCNC: 31.9 MG/DL (ref 6–20)
BUN SERPL-MCNC: 36.1 MG/DL (ref 6–20)
CALCIUM SERPL-MCNC: 8.3 MG/DL (ref 8.6–10)
CALCIUM SERPL-MCNC: 8.9 MG/DL (ref 8.6–10)
CHLORIDE SERPL-SCNC: 103 MMOL/L (ref 98–107)
CHLORIDE SERPL-SCNC: 104 MMOL/L (ref 98–107)
CREAT SERPL-MCNC: 1.98 MG/DL (ref 0.67–1.17)
CREAT SERPL-MCNC: 2.19 MG/DL (ref 0.67–1.17)
DEPRECATED HCO3 PLAS-SCNC: 18 MMOL/L (ref 22–29)
DEPRECATED HCO3 PLAS-SCNC: 22 MMOL/L (ref 22–29)
DIASTOLIC BLOOD PRESSURE - MUSE: NORMAL MMHG
EGFRCR SERPLBLD CKD-EPI 2021: 35 ML/MIN/1.73M2
EGFRCR SERPLBLD CKD-EPI 2021: 40 ML/MIN/1.73M2
ERYTHROCYTE [DISTWIDTH] IN BLOOD BY AUTOMATED COUNT: 17 % (ref 10–15)
FIBRINOGEN PPP-MCNC: 310 MG/DL (ref 170–490)
GLUCOSE BLDC GLUCOMTR-MCNC: 127 MG/DL (ref 70–99)
GLUCOSE BLDC GLUCOMTR-MCNC: 168 MG/DL (ref 70–99)
GLUCOSE BLDC GLUCOMTR-MCNC: 176 MG/DL (ref 70–99)
GLUCOSE BLDC GLUCOMTR-MCNC: 181 MG/DL (ref 70–99)
GLUCOSE BLDC GLUCOMTR-MCNC: 186 MG/DL (ref 70–99)
GLUCOSE BLDC GLUCOMTR-MCNC: 188 MG/DL (ref 70–99)
GLUCOSE BLDC GLUCOMTR-MCNC: 196 MG/DL (ref 70–99)
GLUCOSE BLDC GLUCOMTR-MCNC: 205 MG/DL (ref 70–99)
GLUCOSE BLDC GLUCOMTR-MCNC: 218 MG/DL (ref 70–99)
GLUCOSE BLDC GLUCOMTR-MCNC: 220 MG/DL (ref 70–99)
GLUCOSE BLDC GLUCOMTR-MCNC: 224 MG/DL (ref 70–99)
GLUCOSE BLDC GLUCOMTR-MCNC: 227 MG/DL (ref 70–99)
GLUCOSE BLDC GLUCOMTR-MCNC: 227 MG/DL (ref 70–99)
GLUCOSE BLDC GLUCOMTR-MCNC: 228 MG/DL (ref 70–99)
GLUCOSE BLDC GLUCOMTR-MCNC: 231 MG/DL (ref 70–99)
GLUCOSE BLDC GLUCOMTR-MCNC: 238 MG/DL (ref 70–99)
GLUCOSE BLDC GLUCOMTR-MCNC: 247 MG/DL (ref 70–99)
GLUCOSE BLDC GLUCOMTR-MCNC: 258 MG/DL (ref 70–99)
GLUCOSE BLDC GLUCOMTR-MCNC: 292 MG/DL (ref 70–99)
GLUCOSE BLDC GLUCOMTR-MCNC: 295 MG/DL (ref 70–99)
GLUCOSE BLDC GLUCOMTR-MCNC: 303 MG/DL (ref 70–99)
GLUCOSE SERPL-MCNC: 205 MG/DL (ref 70–99)
GLUCOSE SERPL-MCNC: 218 MG/DL (ref 70–99)
HCT VFR BLD AUTO: 32.4 % (ref 40–53)
HGB BLD-MCNC: 10.4 G/DL (ref 13.3–17.7)
INR PPP: 1.13 (ref 0.85–1.15)
INTERPRETATION ECG - MUSE: NORMAL
MAGNESIUM SERPL-MCNC: 1.4 MG/DL (ref 1.7–2.3)
MCH RBC QN AUTO: 31.9 PG (ref 26.5–33)
MCHC RBC AUTO-ENTMCNC: 32.1 G/DL (ref 31.5–36.5)
MCV RBC AUTO: 99 FL (ref 78–100)
P AXIS - MUSE: 43 DEGREES
PHOSPHATE SERPL-MCNC: 4.5 MG/DL (ref 2.5–4.5)
PLATELET # BLD AUTO: 207 10E3/UL (ref 150–450)
POTASSIUM SERPL-SCNC: 5.9 MMOL/L (ref 3.4–5.3)
POTASSIUM SERPL-SCNC: 6.1 MMOL/L (ref 3.4–5.3)
POTASSIUM SERPL-SCNC: 6.1 MMOL/L (ref 3.4–5.3)
POTASSIUM SERPL-SCNC: 6.3 MMOL/L (ref 3.4–5.3)
POTASSIUM SERPL-SCNC: 6.3 MMOL/L (ref 3.4–5.3)
POTASSIUM SERPL-SCNC: 6.4 MMOL/L (ref 3.4–5.3)
POTASSIUM SERPL-SCNC: 6.6 MMOL/L (ref 3.4–5.3)
PR INTERVAL - MUSE: 140 MS
PROT SERPL-MCNC: 5.4 G/DL (ref 6.4–8.3)
QRS DURATION - MUSE: 80 MS
QT - MUSE: 372 MS
QTC - MUSE: 426 MS
R AXIS - MUSE: -6 DEGREES
RBC # BLD AUTO: 3.26 10E6/UL (ref 4.4–5.9)
SODIUM SERPL-SCNC: 131 MMOL/L (ref 135–145)
SODIUM SERPL-SCNC: 132 MMOL/L (ref 135–145)
SYSTOLIC BLOOD PRESSURE - MUSE: NORMAL MMHG
T AXIS - MUSE: 39 DEGREES
TACROLIMUS BLD-MCNC: 21 UG/L (ref 5–15)
TME LAST DOSE: ABNORMAL H
TME LAST DOSE: ABNORMAL H
VENTRICULAR RATE- MUSE: 79 BPM
WBC # BLD AUTO: 6.2 10E3/UL (ref 4–11)

## 2024-03-31 PROCEDURE — 258N000003 HC RX IP 258 OP 636

## 2024-03-31 PROCEDURE — 258N000003 HC RX IP 258 OP 636: Performed by: EMERGENCY MEDICINE

## 2024-03-31 PROCEDURE — 250N000012 HC RX MED GY IP 250 OP 636 PS 637

## 2024-03-31 PROCEDURE — 250N000013 HC RX MED GY IP 250 OP 250 PS 637: Performed by: PHYSICIAN ASSISTANT

## 2024-03-31 PROCEDURE — 36415 COLL VENOUS BLD VENIPUNCTURE: CPT

## 2024-03-31 PROCEDURE — 258N000001 HC RX 258

## 2024-03-31 PROCEDURE — 250N000009 HC RX 250

## 2024-03-31 PROCEDURE — 84132 ASSAY OF SERUM POTASSIUM: CPT

## 2024-03-31 PROCEDURE — 85730 THROMBOPLASTIN TIME PARTIAL: CPT

## 2024-03-31 PROCEDURE — 87149 DNA/RNA DIRECT PROBE: CPT | Performed by: EMERGENCY MEDICINE

## 2024-03-31 PROCEDURE — 250N000011 HC RX IP 250 OP 636: Performed by: PHYSICIAN ASSISTANT

## 2024-03-31 PROCEDURE — 93005 ELECTROCARDIOGRAM TRACING: CPT

## 2024-03-31 PROCEDURE — 80053 COMPREHEN METABOLIC PANEL: CPT | Performed by: EMERGENCY MEDICINE

## 2024-03-31 PROCEDURE — 250N000012 HC RX MED GY IP 250 OP 636 PS 637: Performed by: PHYSICIAN ASSISTANT

## 2024-03-31 PROCEDURE — 85610 PROTHROMBIN TIME: CPT

## 2024-03-31 PROCEDURE — 250N000012 HC RX MED GY IP 250 OP 636 PS 637: Performed by: EMERGENCY MEDICINE

## 2024-03-31 PROCEDURE — 94640 AIRWAY INHALATION TREATMENT: CPT | Mod: 76

## 2024-03-31 PROCEDURE — 71045 X-RAY EXAM CHEST 1 VIEW: CPT | Mod: 26 | Performed by: STUDENT IN AN ORGANIZED HEALTH CARE EDUCATION/TRAINING PROGRAM

## 2024-03-31 PROCEDURE — 250N000011 HC RX IP 250 OP 636

## 2024-03-31 PROCEDURE — 999N000157 HC STATISTIC RCP TIME EA 10 MIN

## 2024-03-31 PROCEDURE — 71045 X-RAY EXAM CHEST 1 VIEW: CPT

## 2024-03-31 PROCEDURE — 87181 SC STD AGAR DILUTION PER AGT: CPT | Performed by: EMERGENCY MEDICINE

## 2024-03-31 PROCEDURE — 94640 AIRWAY INHALATION TREATMENT: CPT

## 2024-03-31 PROCEDURE — 250N000009 HC RX 250: Performed by: PHYSICIAN ASSISTANT

## 2024-03-31 PROCEDURE — 80197 ASSAY OF TACROLIMUS: CPT

## 2024-03-31 PROCEDURE — 258N000001 HC RX 258: Performed by: EMERGENCY MEDICINE

## 2024-03-31 PROCEDURE — 250N000011 HC RX IP 250 OP 636: Performed by: EMERGENCY MEDICINE

## 2024-03-31 PROCEDURE — 82962 GLUCOSE BLOOD TEST: CPT

## 2024-03-31 PROCEDURE — 83735 ASSAY OF MAGNESIUM: CPT

## 2024-03-31 PROCEDURE — 84100 ASSAY OF PHOSPHORUS: CPT

## 2024-03-31 PROCEDURE — 999N000128 HC STATISTIC PERIPHERAL IV START W/O US GUIDANCE

## 2024-03-31 PROCEDURE — 250N000013 HC RX MED GY IP 250 OP 250 PS 637

## 2024-03-31 PROCEDURE — 85384 FIBRINOGEN ACTIVITY: CPT

## 2024-03-31 PROCEDURE — 36415 COLL VENOUS BLD VENIPUNCTURE: CPT | Performed by: EMERGENCY MEDICINE

## 2024-03-31 PROCEDURE — 99223 1ST HOSP IP/OBS HIGH 75: CPT | Mod: FS | Performed by: NURSE PRACTITIONER

## 2024-03-31 PROCEDURE — 120N000011 HC R&B TRANSPLANT UMMC

## 2024-03-31 PROCEDURE — 96361 HYDRATE IV INFUSION ADD-ON: CPT | Performed by: EMERGENCY MEDICINE

## 2024-03-31 PROCEDURE — 85027 COMPLETE CBC AUTOMATED: CPT

## 2024-03-31 PROCEDURE — 82955 ASSAY OF G6PD ENZYME: CPT | Performed by: PHYSICIAN ASSISTANT

## 2024-03-31 PROCEDURE — 96365 THER/PROPH/DIAG IV INF INIT: CPT | Performed by: EMERGENCY MEDICINE

## 2024-03-31 RX ORDER — ONDANSETRON 4 MG/1
4 TABLET, FILM COATED ORAL EVERY 8 HOURS PRN
Status: DISCONTINUED | OUTPATIENT
Start: 2024-03-31 | End: 2024-04-02 | Stop reason: HOSPADM

## 2024-03-31 RX ORDER — OXYCODONE HYDROCHLORIDE 5 MG/1
5 TABLET ORAL EVERY 6 HOURS PRN
Status: DISCONTINUED | OUTPATIENT
Start: 2024-03-31 | End: 2024-04-02 | Stop reason: HOSPADM

## 2024-03-31 RX ORDER — VALGANCICLOVIR 450 MG/1
450 TABLET, FILM COATED ORAL EVERY EVENING
Status: DISCONTINUED | OUTPATIENT
Start: 2024-03-31 | End: 2024-04-02 | Stop reason: HOSPADM

## 2024-03-31 RX ORDER — NALOXONE HYDROCHLORIDE 0.4 MG/ML
0.2 INJECTION, SOLUTION INTRAMUSCULAR; INTRAVENOUS; SUBCUTANEOUS
Status: DISCONTINUED | OUTPATIENT
Start: 2024-03-31 | End: 2024-04-02 | Stop reason: HOSPADM

## 2024-03-31 RX ORDER — CALCIUM GLUCONATE 20 MG/ML
1 INJECTION, SOLUTION INTRAVENOUS ONCE
Status: COMPLETED | OUTPATIENT
Start: 2024-03-31 | End: 2024-03-31

## 2024-03-31 RX ORDER — ALBUTEROL SULFATE 5 MG/ML
10 SOLUTION RESPIRATORY (INHALATION) ONCE
Status: COMPLETED | OUTPATIENT
Start: 2024-03-31 | End: 2024-03-31

## 2024-03-31 RX ORDER — TACROLIMUS 1 MG/1
3 CAPSULE ORAL
Status: DISCONTINUED | OUTPATIENT
Start: 2024-04-01 | End: 2024-04-01

## 2024-03-31 RX ORDER — BUMETANIDE 0.25 MG/ML
1 INJECTION INTRAMUSCULAR; INTRAVENOUS ONCE
Status: COMPLETED | OUTPATIENT
Start: 2024-03-31 | End: 2024-03-31

## 2024-03-31 RX ORDER — PIPERACILLIN SODIUM, TAZOBACTAM SODIUM 3; .375 G/15ML; G/15ML
3.38 INJECTION, POWDER, LYOPHILIZED, FOR SOLUTION INTRAVENOUS EVERY 6 HOURS
Status: DISCONTINUED | OUTPATIENT
Start: 2024-03-31 | End: 2024-04-02 | Stop reason: HOSPADM

## 2024-03-31 RX ORDER — SULFAMETHOXAZOLE AND TRIMETHOPRIM 400; 80 MG/1; MG/1
1 TABLET ORAL EVERY EVENING
Status: DISCONTINUED | OUTPATIENT
Start: 2024-03-31 | End: 2024-04-02 | Stop reason: HOSPADM

## 2024-03-31 RX ORDER — ACETAMINOPHEN 500 MG
500-1000 TABLET ORAL EVERY 6 HOURS PRN
Status: DISCONTINUED | OUTPATIENT
Start: 2024-03-31 | End: 2024-04-02 | Stop reason: HOSPADM

## 2024-03-31 RX ORDER — NALOXONE HYDROCHLORIDE 0.4 MG/ML
0.4 INJECTION, SOLUTION INTRAMUSCULAR; INTRAVENOUS; SUBCUTANEOUS
Status: DISCONTINUED | OUTPATIENT
Start: 2024-03-31 | End: 2024-04-02 | Stop reason: HOSPADM

## 2024-03-31 RX ORDER — PIPERACILLIN SODIUM, TAZOBACTAM SODIUM 3; .375 G/15ML; G/15ML
3.38 INJECTION, POWDER, LYOPHILIZED, FOR SOLUTION INTRAVENOUS ONCE
Qty: 15 ML | Refills: 0 | Status: COMPLETED | OUTPATIENT
Start: 2024-03-31 | End: 2024-03-31

## 2024-03-31 RX ORDER — CALCIUM GLUCONATE 94 MG/ML
1 INJECTION, SOLUTION INTRAVENOUS ONCE
Status: DISCONTINUED | OUTPATIENT
Start: 2024-03-31 | End: 2024-03-31

## 2024-03-31 RX ORDER — SODIUM BICARBONATE 650 MG/1
1300 TABLET ORAL 2 TIMES DAILY
Status: DISCONTINUED | OUTPATIENT
Start: 2024-03-31 | End: 2024-03-31

## 2024-03-31 RX ORDER — CALCIUM GLUCONATE 94 MG/ML
1 INJECTION, SOLUTION INTRAVENOUS ONCE
Status: COMPLETED | OUTPATIENT
Start: 2024-03-31 | End: 2024-03-31

## 2024-03-31 RX ORDER — PANTOPRAZOLE SODIUM 40 MG/1
40 TABLET, DELAYED RELEASE ORAL 2 TIMES DAILY
Status: DISCONTINUED | OUTPATIENT
Start: 2024-03-31 | End: 2024-04-02 | Stop reason: HOSPADM

## 2024-03-31 RX ORDER — DEXTROSE MONOHYDRATE 25 G/50ML
25-50 INJECTION, SOLUTION INTRAVENOUS
Status: DISCONTINUED | OUTPATIENT
Start: 2024-03-31 | End: 2024-03-31

## 2024-03-31 RX ORDER — POLYETHYLENE GLYCOL 3350 17 G/17G
17 POWDER, FOR SOLUTION ORAL DAILY
Status: DISCONTINUED | OUTPATIENT
Start: 2024-03-31 | End: 2024-04-02 | Stop reason: HOSPADM

## 2024-03-31 RX ORDER — ASPIRIN 325 MG
325 TABLET ORAL DAILY
Status: DISCONTINUED | OUTPATIENT
Start: 2024-03-31 | End: 2024-04-02 | Stop reason: HOSPADM

## 2024-03-31 RX ORDER — MAGNESIUM OXIDE 400 MG/1
400 TABLET ORAL 3 TIMES DAILY
Status: DISCONTINUED | OUTPATIENT
Start: 2024-03-31 | End: 2024-04-01

## 2024-03-31 RX ORDER — MULTIPLE VITAMINS W/ MINERALS TAB 9MG-400MCG
1 TAB ORAL DAILY
Status: DISCONTINUED | OUTPATIENT
Start: 2024-03-31 | End: 2024-04-02 | Stop reason: HOSPADM

## 2024-03-31 RX ORDER — PREDNISONE 5 MG/1
5 TABLET ORAL DAILY
Status: DISCONTINUED | OUTPATIENT
Start: 2024-03-31 | End: 2024-04-02 | Stop reason: HOSPADM

## 2024-03-31 RX ORDER — HYDROXYZINE HYDROCHLORIDE 25 MG/1
25 TABLET, FILM COATED ORAL EVERY 6 HOURS PRN
Status: DISCONTINUED | OUTPATIENT
Start: 2024-03-31 | End: 2024-04-02 | Stop reason: HOSPADM

## 2024-03-31 RX ORDER — NICOTINE POLACRILEX 4 MG
15-30 LOZENGE BUCCAL
Status: DISCONTINUED | OUTPATIENT
Start: 2024-03-31 | End: 2024-03-31

## 2024-03-31 RX ORDER — BUMETANIDE 0.25 MG/ML
2 INJECTION INTRAMUSCULAR; INTRAVENOUS ONCE
Status: COMPLETED | OUTPATIENT
Start: 2024-03-31 | End: 2024-03-31

## 2024-03-31 RX ORDER — TACROLIMUS 5 MG/1
5 CAPSULE ORAL
Status: DISCONTINUED | OUTPATIENT
Start: 2024-03-31 | End: 2024-03-31

## 2024-03-31 RX ORDER — SODIUM POLYSTYRENE SULFONATE 15 G/60ML
30 SUSPENSION ORAL; RECTAL ONCE
Status: DISCONTINUED | OUTPATIENT
Start: 2024-03-31 | End: 2024-03-31

## 2024-03-31 RX ORDER — DEXTROSE MONOHYDRATE 25 G/50ML
25 INJECTION, SOLUTION INTRAVENOUS ONCE
Status: DISCONTINUED | OUTPATIENT
Start: 2024-03-31 | End: 2024-03-31

## 2024-03-31 RX ORDER — HYDROMORPHONE HCL IN WATER/PF 6 MG/30 ML
.2-.4 PATIENT CONTROLLED ANALGESIA SYRINGE INTRAVENOUS EVERY 4 HOURS PRN
Status: DISCONTINUED | OUTPATIENT
Start: 2024-03-31 | End: 2024-04-02 | Stop reason: HOSPADM

## 2024-03-31 RX ORDER — MYCOPHENOLATE MOFETIL 250 MG/1
750 CAPSULE ORAL
Status: DISCONTINUED | OUTPATIENT
Start: 2024-03-31 | End: 2024-04-02 | Stop reason: HOSPADM

## 2024-03-31 RX ORDER — KETOCONAZOLE 200 MG/1
100 TABLET ORAL DAILY
Status: DISCONTINUED | OUTPATIENT
Start: 2024-03-31 | End: 2024-04-01

## 2024-03-31 RX ORDER — SODIUM CHLORIDE 9 MG/ML
INJECTION, SOLUTION INTRAVENOUS CONTINUOUS
Status: DISCONTINUED | OUTPATIENT
Start: 2024-03-31 | End: 2024-04-02 | Stop reason: HOSPADM

## 2024-03-31 RX ORDER — SODIUM CHLORIDE 9 MG/ML
INJECTION, SOLUTION INTRAVENOUS CONTINUOUS
Status: DISCONTINUED | OUTPATIENT
Start: 2024-03-31 | End: 2024-03-31

## 2024-03-31 RX ORDER — AMOXICILLIN 250 MG
1-2 CAPSULE ORAL 2 TIMES DAILY PRN
Status: DISCONTINUED | OUTPATIENT
Start: 2024-03-31 | End: 2024-04-01

## 2024-03-31 RX ORDER — MAGNESIUM GLYCINATE 100 MG
200 CAPSULE ORAL 3 TIMES DAILY
Status: DISCONTINUED | OUTPATIENT
Start: 2024-03-31 | End: 2024-03-31

## 2024-03-31 RX ORDER — DEXTROSE MONOHYDRATE 25 G/50ML
25 INJECTION, SOLUTION INTRAVENOUS ONCE
Status: COMPLETED | OUTPATIENT
Start: 2024-03-31 | End: 2024-03-31

## 2024-03-31 RX ADMIN — SODIUM CHLORIDE: 9 INJECTION, SOLUTION INTRAVENOUS at 22:41

## 2024-03-31 RX ADMIN — DEXTROSE MONOHYDRATE 300 ML: 100 INJECTION, SOLUTION INTRAVENOUS at 06:04

## 2024-03-31 RX ADMIN — PREDNISONE 5 MG: 5 TABLET ORAL at 11:25

## 2024-03-31 RX ADMIN — Medication 1 TABLET: at 11:24

## 2024-03-31 RX ADMIN — PIPERACILLIN SODIUM AND TAZOBACTAM SODIUM 3.38 G: 3; .375 INJECTION, POWDER, LYOPHILIZED, FOR SOLUTION INTRAVENOUS at 22:26

## 2024-03-31 RX ADMIN — DEXTROSE MONOHYDRATE 300 ML: 100 INJECTION, SOLUTION INTRAVENOUS at 00:28

## 2024-03-31 RX ADMIN — MYCOPHENOLATE MOFETIL 750 MG: 250 CAPSULE ORAL at 11:24

## 2024-03-31 RX ADMIN — HYDROMORPHONE HYDROCHLORIDE 0.4 MG: 0.2 INJECTION, SOLUTION INTRAMUSCULAR; INTRAVENOUS; SUBCUTANEOUS at 16:05

## 2024-03-31 RX ADMIN — POLYETHYLENE GLYCOL 3350 17 G: 17 POWDER, FOR SOLUTION ORAL at 11:25

## 2024-03-31 RX ADMIN — SODIUM BICARBONATE: 84 INJECTION, SOLUTION INTRAVENOUS at 18:08

## 2024-03-31 RX ADMIN — ASPIRIN 325 MG ORAL TABLET 325 MG: 325 PILL ORAL at 11:24

## 2024-03-31 RX ADMIN — HYDROMORPHONE HYDROCHLORIDE 0.2 MG: 0.2 INJECTION, SOLUTION INTRAMUSCULAR; INTRAVENOUS; SUBCUTANEOUS at 11:23

## 2024-03-31 RX ADMIN — SODIUM CHLORIDE 1000 ML: 9 INJECTION, SOLUTION INTRAVENOUS at 08:10

## 2024-03-31 RX ADMIN — INSULIN ASPART 4 UNITS: 100 INJECTION, SOLUTION INTRAVENOUS; SUBCUTANEOUS at 17:02

## 2024-03-31 RX ADMIN — BUMETANIDE 1 MG: 0.25 INJECTION INTRAMUSCULAR; INTRAVENOUS at 10:46

## 2024-03-31 RX ADMIN — DEXTROSE MONOHYDRATE 25 G: 25 INJECTION, SOLUTION INTRAVENOUS at 06:04

## 2024-03-31 RX ADMIN — SODIUM BICARBONATE: 84 INJECTION, SOLUTION INTRAVENOUS at 10:17

## 2024-03-31 RX ADMIN — CALCIUM GLUCONATE 1 G: 20 INJECTION, SOLUTION INTRAVENOUS at 07:41

## 2024-03-31 RX ADMIN — CALCIUM GLUCONATE 1 G: 98 INJECTION, SOLUTION INTRAVENOUS at 22:24

## 2024-03-31 RX ADMIN — DEXTROSE MONOHYDRATE 25 G: 25 INJECTION, SOLUTION INTRAVENOUS at 00:27

## 2024-03-31 RX ADMIN — MYCOPHENOLATE MOFETIL 750 MG: 250 CAPSULE ORAL at 18:08

## 2024-03-31 RX ADMIN — ALBUTEROL SULFATE 10 MG: 2.5 SOLUTION RESPIRATORY (INHALATION) at 11:03

## 2024-03-31 RX ADMIN — SODIUM ZIRCONIUM CYCLOSILICATE 15 G: 10 POWDER, FOR SUSPENSION ORAL at 18:09

## 2024-03-31 RX ADMIN — MAGNESIUM OXIDE TAB 400 MG (241.3 MG ELEMENTAL MG) 400 MG: 400 (241.3 MG) TAB at 19:51

## 2024-03-31 RX ADMIN — PIPERACILLIN SODIUM AND TAZOBACTAM SODIUM 3.38 G: 3; .375 INJECTION, POWDER, LYOPHILIZED, FOR SOLUTION INTRAVENOUS at 16:08

## 2024-03-31 RX ADMIN — SODIUM CHLORIDE 10 UNITS: 9 INJECTION, SOLUTION INTRAVENOUS at 06:01

## 2024-03-31 RX ADMIN — HYDROXYZINE HYDROCHLORIDE 25 MG: 25 TABLET, FILM COATED ORAL at 23:04

## 2024-03-31 RX ADMIN — OXYCODONE HYDROCHLORIDE 5 MG: 5 TABLET ORAL at 23:04

## 2024-03-31 RX ADMIN — TACROLIMUS 5 MG: 5 CAPSULE ORAL at 11:25

## 2024-03-31 RX ADMIN — VALGANCICLOVIR HYDROCHLORIDE 450 MG: 450 TABLET ORAL at 19:50

## 2024-03-31 RX ADMIN — SODIUM CHLORIDE 1000 ML: 9 INJECTION, SOLUTION INTRAVENOUS at 14:37

## 2024-03-31 RX ADMIN — BUMETANIDE 2 MG: 0.25 INJECTION INTRAMUSCULAR; INTRAVENOUS at 22:24

## 2024-03-31 RX ADMIN — ALBUTEROL SULFATE 10 MG: 2.5 SOLUTION RESPIRATORY (INHALATION) at 21:11

## 2024-03-31 RX ADMIN — SODIUM CHLORIDE 9 UNITS: 9 INJECTION, SOLUTION INTRAVENOUS at 00:27

## 2024-03-31 RX ADMIN — SODIUM CHLORIDE 10 UNITS: 9 INJECTION, SOLUTION INTRAVENOUS at 22:25

## 2024-03-31 RX ADMIN — PANTOPRAZOLE SODIUM 40 MG: 40 TABLET, DELAYED RELEASE ORAL at 19:51

## 2024-03-31 RX ADMIN — PIPERACILLIN SODIUM AND TAZOBACTAM SODIUM 3.38 G: 3; .375 INJECTION, POWDER, LYOPHILIZED, FOR SOLUTION INTRAVENOUS at 10:51

## 2024-03-31 RX ADMIN — SODIUM ZIRCONIUM CYCLOSILICATE 15 G: 10 POWDER, FOR SUSPENSION ORAL at 12:06

## 2024-03-31 RX ADMIN — Medication 100 MG: at 12:05

## 2024-03-31 RX ADMIN — INSULIN ASPART 1 UNITS: 100 INJECTION, SOLUTION INTRAVENOUS; SUBCUTANEOUS at 12:02

## 2024-03-31 RX ADMIN — PIPERACILLIN AND TAZOBACTAM 3.38 G: 3; .375 INJECTION, POWDER, FOR SOLUTION INTRAVENOUS at 01:01

## 2024-03-31 RX ADMIN — SODIUM BICARBONATE 1300 MG: 650 TABLET ORAL at 07:53

## 2024-03-31 RX ADMIN — SODIUM CHLORIDE 500 ML: 9 INJECTION, SOLUTION INTRAVENOUS at 22:13

## 2024-03-31 RX ADMIN — OXYCODONE HYDROCHLORIDE 5 MG: 5 TABLET ORAL at 14:43

## 2024-03-31 RX ADMIN — SODIUM CHLORIDE 10 UNITS: 9 INJECTION, SOLUTION INTRAVENOUS at 17:28

## 2024-03-31 RX ADMIN — CALCIUM GLUCONATE 1 G: 20 INJECTION, SOLUTION INTRAVENOUS at 00:31

## 2024-03-31 RX ADMIN — PANTOPRAZOLE SODIUM 40 MG: 40 TABLET, DELAYED RELEASE ORAL at 11:25

## 2024-03-31 RX ADMIN — MAGNESIUM OXIDE TAB 400 MG (241.3 MG ELEMENTAL MG) 400 MG: 400 (241.3 MG) TAB at 13:54

## 2024-03-31 ASSESSMENT — ACTIVITIES OF DAILY LIVING (ADL)
ADLS_ACUITY_SCORE: 35
ADLS_ACUITY_SCORE: 38
ADLS_ACUITY_SCORE: 35
ADLS_ACUITY_SCORE: 38
ADLS_ACUITY_SCORE: 38
ADLS_ACUITY_SCORE: 35
ADLS_ACUITY_SCORE: 38
ADLS_ACUITY_SCORE: 35
ADLS_ACUITY_SCORE: 35
ADLS_ACUITY_SCORE: 38
ADLS_ACUITY_SCORE: 35

## 2024-03-31 NOTE — PLAN OF CARE
"/75 (BP Location: Right arm, Patient Position: Sitting, Cuff Size: Adult Regular)   Pulse 70   Temp 97.5  F (36.4  C) (Oral)   Resp 16   Ht 1.727 m (5' 8\")   Wt 85.7 kg (188 lb 14.4 oz)   SpO2 100%   BMI 28.72 kg/m      Reason for Admission: Pt. admitted from ED for abdominal pain. Pt. oriented to unit routines, call light. 2 RN skin check & admission profile done. Call light in reach.     Neuro: Pt. alert & Ox4  Behavior:  Pt. calm & cooperative with cares.   Activity: Pt. up with SBA.  Vital: AVSS on RA  B, 258, 168  LDAs: Left PIV with D10 300cc bolus over 4 hours.  Cardiac: WNL  Respiratory: LS diminished bases. Pt. denies SOB.  GI/: Pt. voiding spontaneously without difficulty. No stools since arrival.  Last BM 3/30.   Skin: Abdominal incision approximated with steri-strips & RON.  Pain/Nausea: Pt. c/o abdominal pain and declined analgesics. Pt. denies nausea.   Diet:   Labs/Imaging: K+ 6.6-MD notified & ordered D10 bolus, D50 1amp & Insulin 8.6units. Waiting for Calcium gluconate 1gm IV from pharmacy.  Plan:  Next K+ at 10am.  Continue to follow POC & notify team with change in status.                         "

## 2024-03-31 NOTE — ED NOTES
Emergency Department Patient Sign-out     Brief HPI and ED course:  Patient is a 53 year old male signed out to me by Dr. Perry.  See initial ED Provider note for details of the presentation. In brief, liver tx 3/5/2024 presenting with abdominal pain, has abscess. Surgery team evaluating. K 6.6 without ECG changes. Shifted with insulin + glucose. Cultures drawn zosyn given.     Vitals:   Patient Vitals for the past 24 hrs:   BP Temp Temp src Pulse Resp SpO2   03/30/24 2142 102/69 98  F (36.7  C) Oral 85 18 100 %     Received Sign-out Plan:    Pending studies include: transplant US      Plan:   - f/u US read, surgery recs  - admit    Events after assuming care:  Ultrasound redemonstrated fluid collection right is likely hematoma with subacute bleeding.  Transplant surgery recommended admission to their service, deferring antibiotics for now.  Patient admitted to transplant surgery.    Repeat potassium decreased to 5.9 after shifting.  Glucose remained 176-231 range.     --  Rickey Preciado MD   Emergency Medicine         Rickey Preciado MD  03/31/24 5088

## 2024-03-31 NOTE — PROGRESS NOTES
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 Admitted on 3/30/24 for ongoing care/monitoring of electrolyte abnormalities and plans for management of fluid collection.    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 Treated with Methylprednisolone 500mg x3 (3/16-3/18) followed by taper.     -Induction steroid taper per protocol; currently on 5 mg daily prednisone  -Tacrolimus 5 mg BID with ketoconazole boost (100 mg daily), goal level 8-10. Last Tac level 21 (0600 3/31). Last dose PTA 3/30 7 pm. AM dose 3/31 0700.    -Mycophenolate 750mg BID.  - Opportunistic infectious prophylaxis Valcyte x3 mos and Bactrim x6 months (holding Bactrim this admission for hyperkalemia).    Neuro:   - Hydroxyzine 25 mg q6h prn, tylenol prn, seroquel HS prn 25mg   - Dilaudid IV prn  - Oxy 5 mg q6h prn    Hematology:   - Hgb 10.4 (11.3), ongoing fluid resuscitation  - Plt count normal. Last 207  - Trend CBC  - Daily  for hepatic artery prophylaxis    Cardiorespiratory:  "Stable, no new oxygen requirements. History of b/l pleural effusions periop, improved prior to discharge.   - Frequent IS, OOB    GI/Nutrition:  S/p feeding tube placement, removed 3/21. Tolerating regular diet prior to admission.   Diet: Low K+ diet. NPO at midnight for possible IR drainage.     Endocrine:   IDDM T2DM with steroid hyperglycemia:   - Endo consulted last admission  - Home discharge regimen: glargine 23U daily (held currently)  - Medium SSI    Fluid/Electrolytes:   Hypovolemia: s/p 1L NS 3/30, 2L 3/31  Hyperkalemia: shifted 3/30 with albuterol, bumex, insulin/dextrose, sodium bicarb gtt.  - Trend K+ q4h until normalized    : No paris. Strict I/Os.   - Urine output ~30 ml/hr since admission.   - Fluid boluses as above for suspected hypovolemia as above    Infectious disease: Hold bactrim for elevated K. Continue Valcyte ppx.  - Zosyn q6h for possible abx infection    Prophylaxis: DVT, fall, GI, fungal    Disposition: 7A     JOSELYN/Fellow/Resident Provider: Meenu Chen MD    Faculty: Dr. Shreya MD  _________________________________________________________________  Transplant History: Admitted 3/30/2024 for Abdominal pain, subhepatic fluid collection.  3/5/2024 (Liver), Postoperative day: 26     Interval History: Admitted overnight for abdominal pain with subhepatic fluid collection. Denies chest pain or SOB. Denies nausea/vomiting. When asked about appetite, states he \"could eat\". No fevers/chills/night sweats. Rates pain 4/10 when still, 6/10 with  movement. Has noted a small ball of pain to R abdomen with movement. Requesting stronger pain medications upon discharge as was unhappy with outpatient regimen.     ROS:   A 10-point review of systems was negative except as noted above.    Meds:   aspirin  325 mg Oral or Feeding Tube Daily    insulin aspart  1-7 Units Subcutaneous TID AC    insulin aspart  1-5 Units Subcutaneous At Bedtime    ketoconazole  100 mg Oral Daily    magnesium oxide  400 " "mg Oral TID    multivitamin w/minerals  1 tablet Oral Daily    mycophenolate  750 mg Oral BID IS    pantoprazole  40 mg Oral BID    piperacillin-tazobactam  3.375 g Intravenous Q6H    polyethylene glycol  17 g Oral Daily    predniSONE  5 mg Oral Daily    sodium chloride 0.9%  1,000 mL Intravenous Once    [Held by provider] sulfamethoxazole-trimethoprim  1 tablet Oral QPM    tacrolimus  5 mg Oral BID IS    valGANciclovir  450 mg Oral QPM       Physical Exam:     Admit Weight: 85.7 kg (188 lb 14.4 oz)    Current vitals:   BP 92/62 (BP Location: Right arm)   Pulse 104   Temp 98.6  F (37  C) (Oral)   Resp 16   Ht 1.727 m (5' 8\")   Wt 85.7 kg (188 lb 14.4 oz)   SpO2 99%   BMI 28.72 kg/m           Vital sign ranges:    Temp:  [97.5  F (36.4  C)-98.6  F (37  C)] 98.6  F (37  C)  Pulse:  [] 104  Resp:  [16-19] 16  BP: ()/(60-75) 92/62  SpO2:  [98 %-100 %] 99 %  Patient Vitals for the past 24 hrs:   BP Temp Temp src Pulse Resp SpO2 Height Weight   03/31/24 1352 92/62 -- -- -- -- 99 % -- --   03/31/24 1306 95/65 98.6  F (37  C) Oral 104 16 100 % -- --   03/31/24 1123 -- 97.9  F (36.6  C) -- 88 -- -- -- --   03/31/24 1103 -- -- -- 78 16 98 % -- --   03/31/24 0903 102/66 97.8  F (36.6  C) Oral 70 18 100 % -- --   03/31/24 0503 102/75 97.5  F (36.4  C) Oral 70 16 100 % -- --   03/31/24 0500 -- -- -- -- -- -- 1.727 m (5' 8\") 85.7 kg (188 lb 14.4 oz)   03/31/24 0400 97/60 -- -- 69 -- 100 % -- --   03/31/24 0230 93/68 -- -- 72 -- 100 % -- --   03/31/24 0150 90/65 -- -- 84 -- 99 % -- --   03/31/24 0100 100/67 97.9  F (36.6  C) Oral 84 19 100 % -- --   03/31/24 0030 99/68 -- -- 82 18 100 % -- --   03/30/24 2142 102/69 98  F (36.7  C) Oral 85 18 100 % -- --     General Appearance: NAD, mildly fatigued appearing   Skin:  trace jaundice  Heart: regular rate and rhythm  Lungs: clear to auscultation on RA  Abdomen: palpation exam deferred at patient request, mildly distended on inspection. Incision well-healing with " dried steristrips in place. Trace bruising over lower quadrants. Localizes pain to R periumbilical area/RLQ.   : no paris  Extremities: mild tremor with movement of upper extremities  Neurologic: alert, oriented. Calm.     Data:   CMP  Recent Labs   Lab 03/31/24  1143 03/31/24  0922 03/31/24  0750 03/31/24  0706 03/31/24  0657 03/31/24  0603 03/31/24  0602 03/31/24  0026 03/30/24  2258 03/28/24  0801 03/25/24  0846   NA  --   --   --   --   --   --  131*  --  132* 135 136   POTASSIUM  --  6.4*  --   --  6.1*  --  6.6*  6.6*   < > 6.6* 5.6* 5.0   CHLORIDE  --   --   --   --   --   --  104  --  104 104 103   CO2  --   --   --   --   --   --  18*  --  19* 24 25   *  --  127*   < >  --    < > 205*   < > 203* 164* 256*   BUN  --   --   --   --   --   --  36.1*  --  34.3* 15.3 15.1   CR  --   --   --   --   --   --  2.19*  --  2.00* 1.20* 0.93   GFRESTIMATED  --   --   --   --   --   --  35*  --  39* 73 >90   MEG  --   --   --   --   --   --  8.9  --  9.5 9.9 9.4   MAG  --   --   --   --   --   --   --   --   --  1.3* 1.5*   PHOS  --   --   --   --   --   --   --   --   --  4.5 3.8   LIPASE  --   --   --   --   --   --   --   --  5*  --   --    ALBUMIN  --   --   --   --   --   --  2.8*  --  3.5 3.5 3.3*   BILITOTAL  --   --   --   --   --   --  0.8  --  1.0 1.2 1.2   ALKPHOS  --   --   --   --   --   --  155*  --  191* 203* 235*   AST  --   --   --   --   --   --  17  --  19 27 30   ALT  --   --   --   --   --   --  13  --  23 35 55    < > = values in this interval not displayed.     CBC  Recent Labs   Lab 03/31/24  0657 03/30/24  2258   HGB 10.4* 11.3*   WBC 6.2 8.3    283     COAGS  Recent Labs   Lab 03/31/24  1339   INR 1.13   PTT 33      Urinalysis  Recent Labs   Lab Test 03/30/24  2248 03/04/24  1042   COLOR Yellow Yellow   APPEARANCE Clear Clear   URINEGLC Negative 500*   URINEBILI Negative Negative   URINEKETONE Negative Negative   SG 1.021 1.007   UBLD Negative Negative   URINEPH 5.0 5.0    PROTEIN Negative Negative   NITRITE Negative Negative   LEUKEST Negative Negative   RBCU 0 <1   WBCU 1 <1     Virology:  Hepatitis C Antibody   Date Value Ref Range Status   03/04/2024 Nonreactive Nonreactive Final     Comment:     A nonreactive screening test result does not exclude the possibility of exposure to or infection with HCV. Nonreactive screening test results in individuals with prior exposure to HCV may be due to antibody levels below the limit of detection of this assay or lack of reactivity to the HCV antigens used in this assay. Patients with recent HCV infections (<3 months from time of exposure) may have false-negative HCV antibody results due to the time needed for seroconversion (average of 8 to 9 weeks).

## 2024-03-31 NOTE — H&P
Physician Attestation   I saw this patient with the resident and agree with the resident/fellow's findings and plan of care as documented in the note.      Key findings: as noted below          [facility]    Consult Note - Transplant Surgery Service  Date of Admission:  3/30/2024  Consult Requested by: ED  Reason for Consult: Abdominal pain, perihepatic fluid collection.     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     This collection likely represents persistent hematoma, present since surgery - unclear if infected but with normal WBC, afebrile, no tachycardia and a normal lactic, this is unlikely. Most likely irritation from the hematoma as opposed to outright infection. Regardless, given his Hyperkalemia/Hyponatremia, will need to admit him for ongoing care/monitoring of electrolyte abnormalities. Will finalize plans for fluid collection in the AM.     - Admit to Transplant surgery  - OK for clears  - Trend K q4h, Na q8h, replace Na w/ NS. EKG now, continue shifting K. Added albuterol inhalers.   - Trend CBC/LFTs  - Repeat hepatic US - results pending.   - Up ad amy  - Hold off on DVT Ppx  - No antibiotics yet at this time.   - Dispo pending plan for fluid collection, like 2-3 days.            Drains: None     Code Status:  Full    Clinically Significant Risk Factors Present on Admission        # Hyperkalemia: Highest K = 6.6 mmol/L in last 2 days, will monitor as appropriate         # Drug Induced Platelet Defect: home medication list includes an antiplatelet medication  # Acute Kidney Injury,  "unspecified: based on a >150% or 0.3 mg/dL increase in last creatinine compared to past 90 day average, will monitor renal function  # Hypertension: Noted on problem list      # Overweight: Estimated body mass index is 28.72 kg/m  as calculated from the following:    Height as of this encounter: 1.727 m (5' 8\").    Weight as of this encounter: 85.7 kg (188 lb 14.4 oz).       # Financial/Environmental Concerns:         The patient's care was discussed with the  Fellow, Dr. Vazquez .    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 Patient reports the ongoing nature of this prompted his presentation to the ED for further eval.     ED eval notable for hemodynamic stability, however did have mild hyponatremia at 132, and moderate hyperkalemia at 6.6. CBC and the rest of the labs were unremarkable. RUQ US of liver graft and CT A/P notable for persistent fluid collection beneath the liver measuring 12.9 by 9.3cm and slightly large than last evaluation >1 week ago. Some rim enhancement around this w/ concern for possible abscess.     At present, denies any F/C/S, notes some anorexia and mild clamminess, however no SOB, no CP, no N/V/D, no changes in bowel or bladder habits. No dizziness/lightheadedness either seated or standing, or with ambulation. No acute questions or concerns. "     Again notes, he just felt off.       Past Medical History    Past Medical History:   Diagnosis Date     ANGEL (acute kidney injury) (H24)      Alcoholic cirrhosis of liver without ascites (H) 2023     Alcoholic hepatitis with ascites (H28) 10/03/2023     Alcoholic hepatitis without ascites (H28) 2023     Closed fracture of one rib of left side 2023     Concussion without loss of consciousness 2020     Decompensated hepatic cirrhosis (H) 09/15/2023     Diabetes mellitus, type 2 (H) 2023     Essential hypertension 2020     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)      Liver replaced by transplant (H) 2024     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    ACR PRAJAPATI -, treated with steroids     Mild hyperlipidemia 2021     Persistent insomnia 2023     Portal hypertension (H) 2023     Scrotal abscess      Secondary esophageal 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 (H) 2023     Tobacco abuse disorder 2020     Type 2 diabetes mellitus with hyperglycemia (H) 2023       Past Surgical History   Past Surgical History:   Procedure Laterality Date     BENCH LIVER  3/5/2024    Procedure: Bench liver;  Surgeon: Ryder Cortez MD;  Location: [facility] OR     CHOLECYSTECTOMY       COLONOSCOPY N/A 2024    Procedure: COLONOSCOPY, WITH POLYPECTOMY;  Surgeon: Jak Urbina MD;  Location: [facility]     CV RIGHT HEART CATH MEASUREMENTS RECORDED N/A 2024    Procedure: Right Heart Catheterization;  Surgeon: Alfred Tafoya MD;  Location:  HEART CARDIAC CATH LAB     IR LIVER BIOPSY PERCUTANEOUS  3/15/2024     TONSILLECTOMY       TRANSPLANT LIVER RECIPIENT  DONOR N/A 3/5/2024    Procedure: Transplant liver recipient  donor, bile duct stent placement;  Surgeon: Ryder Cortez MD;  Location: [facility] OR     VASECTOMY         Prior to Admission Medications   I have reviewed this patient's current medications       Review of Systems     The 10 point Review of Systems is negative other than noted in the HPI or here.     Social History   I have reviewed this patient's social history and updated it with pertinent information if needed.  Social History     Tobacco Use     Smoking status: Former     Types: Cigarettes     Passive exposure: Never     Smokeless tobacco: Current     Types: Chew     Last attempt to quit: 1/1/2004     Tobacco comments:     Chew daily 1/3 of a tin per day   Vaping Use     Vaping Use: Never used   Substance Use Topics     Alcohol use: Not Currently     Alcohol/week: 12.0 standard drinks of alcohol     Types: 12 Standard drinks or equivalent per week     Comment: Sober since 6/25/2023     Drug use: Not Currently         Family History   I have reviewed this patient's family history and updated it with pertinent information if needed.  Family History   Problem Relation Age of Onset     Anxiety Disorder Mother      Depression Mother      Bipolar Disorder Mother      Chronic Obstructive Pulmonary Disease Mother      Lung Cancer Mother 81     Morbid Obesity Father      Diabetes Father      Diabetes Type 2  Brother      Substance Abuse Maternal Grandfather      Substance Abuse Paternal Grandfather      Colon Cancer No family hx of      Liver Disease No family hx of          Allergies   Allergies   Allergen Reactions     Other Food Allergy Anaphylaxis     Legumes (black beans, baked beans, chickpeas)     Pineapple Itching        Physical Exam   Vital Signs: Temp: 97.5  F (36.4  C) Temp src: Oral BP: 102/75 Pulse: 70   Resp: 16 SpO2: 100 % O2 Device: None (Room air)    Weight: 188 lbs 14.4 oz  Intake/Output Summary (Last 24 hours) at 3/31/2024 0644  Last data filed at 3/31/2024 0510  Gross per 24 hour   Intake --   Output 100 ml   Net -100 ml     General: Alert, resting comfortably in NAD/NTA. Cooperative  HEENT: NC/AT, sclerae anicteric. Oral mucosa moist  Neck: Neck supple, Trachea midline  Pulm: NLB on RA, no tachypnea/dyspnea, no  wheezing  CV: RRR by RP, non-cyanotic, no significant LE edema. RP 2+ b/l  GI/ABD: Soft, non- distended, reports mild- tender to palpation of the right abdomen, however this is minimal. no guarding or rebound tenderness. Incision appears to be healing appropriately.   Extrem: MA4E, no obvious deformities  Neuro/Psych: A/Ox3, no gross neurologic deficits. Appropriate mood/affect  Skin: Warm and well perfused              Data     I have personally reviewed the following data over the past 24 hrs:    8.3  \   11.3 (L)   / 283     132 (L) 104 34.3 (H) /  258 (H)   6.6 (HH) 19 (L) 2.00 (H) \     ALT: 23 AST: 19 AP: 191 (H) TBILI: 1.0   ALB: 3.5 TOT PROTEIN: 6.7 LIPASE: 5 (L)     Procal: N/A CRP: N/A Lactic Acid: 0.8         Imaging results reviewed over the past 24 hrs:   Recent Results (from the past 24 hour(s))   CT Abdomen Pelvis w Contrast    Narrative    EXAM: CT ABDOMEN PELVIS W CONTRAST  LOCATION: [facility]  DATE: 3/30/2024    INDICATION: right sided abdominal pain worsening since liver transplant 3 5 24.  non peritonitic.  concern for appendicitis vs post op infection  COMPARISON: 02/23/2024  TECHNIQUE: CT scan of the abdomen and pelvis was performed following injection of IV contrast. Multiplanar reformats were obtained. Dose reduction techniques were used.  CONTRAST: iopamidol (ISOVUE 370) solution 122 mL    FINDINGS:   LOWER CHEST: Small right pleural effusion right basilar atelectasis.    HEPATOBILIARY: Status post hepatic transplant. Biliary stent. No biliary dilatation. Rim enhancing complex right subhepatic collection 12.9 x 9.3 cm S4, 127.    PANCREAS: Normal.    SPLEEN: Splenomegaly, 16 cm.    ADRENAL GLANDS: Normal.    KIDNEYS/BLADDER: Normal.    BOWEL: Normal caliber. Predominantly fluid-filled bowel. The appendix is upper normal caliber to mildly probably distally but there is no significant adjacent inflammation.    LYMPH NODES: Normal.    VASCULATURE:  Patent portal vein. Collateral vessels left upper abdomen.    PELVIC ORGANS: Small amount of free fluid. Bladder is under distended. Wall thickening not excluded    MUSCULOSKELETAL: Degenerative change osseous structures.      Impression    IMPRESSION:   1.  Complex, rim-enhancing right subhepatic fluid collection. Possibilities include postsurgical hematoma or biloma and given the peripheral enhancement superimposed infection suspected.  2.  Small amount of pelvic free fluid.  3.  Distal appendix is mildly prominent but there is no significant adjacent inflammation.  4.  Underdistended bladder. Wall thickening/cystitis not excluded.   US Liver Transplant Follow Up    Impression    RESIDENT PRELIMINARY INTERPRETATION  Impression:   1.  Mildly enlarged hematoma inferior to the right hepatic lobe with  increased hypoechoic appearance, representing acute to subacute  bleeding, measuring 16 x 5 x 14 cm, previously 13 x 5 x 14 cm on  3/6/2024.  2.  Patent hepatic vasculature with antegrade flow.  3.  Small right pleural effusion.

## 2024-03-31 NOTE — ED PROVIDER NOTES
"    [place] EMERGENCY DEPARTMENT ([facility])    3/30/24         History     Chief Complaint   Patient presents with    Post-op Problem     HPI  Mary Lopez is a 53 year old male with PMH notable for EtOH cirrhosis c/b encephalopathy, hyponatremia, chronic thrombocytopenia, macrocytic anemia, carrier of hemochromatosis HFE gene mutation,  with hospitalization (1/2024) for encephalopathy and suspected SBP, and recent admission 2/23-3/2 for Strep bovis bacteremia now s/p liver transplant (3/5/24) who presents to the ED with post-op problem. She reports intermittent stabbing pain at incision site. Pain is worse with activity.     Patient had a liver transplant on the 5th and was discharged on the 21st. Over the last few days it has been increasing to intolerability. Patient has  abdominal pain mainly over the right lower quadrant. The pain is constant and stays in one spot. It is associated with \"twitching\" sensation and lightning bolts. It was hurting him to exhale as well.     Patient has not been eating or drinking much and he has been having diarrhea. He also reports that his back has been starting to pain as well, especially when he tries to get up. Patient has slight pain with urination; his urine has been clear and not cloudy. For his pain he has been taking Tylenol and hydroxyzine for pain     Patients transplant team knows that he is here. Patient has a history of gallbladder surgery ten to twelve years ago.     Denies fever, chills and vomiting. Denies recent falls or trauma.           Past Medical History  Past Medical History:   Diagnosis Date    ANGEL (acute kidney injury) (H24)     Alcoholic cirrhosis of liver without ascites (H) 07/13/2023    Alcoholic hepatitis with ascites (H28) 10/03/2023    Alcoholic hepatitis without ascites (H28) 07/13/2023    Closed fracture of one rib of left side 09/14/2023    Concussion without loss of consciousness 03/11/2020    Decompensated hepatic " cirrhosis (H) 09/15/2023    Diabetes mellitus, type 2 (H) 2023    Essential hypertension 2020    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)     Liver replaced by transplant (H) 2024    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    ACR PRAJAPATI 6-7/9, treated with steroids    Mild hyperlipidemia 2021    Persistent insomnia 2023    Portal hypertension (H) 2023    Scrotal abscess     Secondary esophageal 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 (H) 2023    Tobacco abuse disorder 2020    Type 2 diabetes mellitus with hyperglycemia (H) 2023     Past Surgical History:   Procedure Laterality Date    BENCH LIVER  3/5/2024    Procedure: Bench liver;  Surgeon: Ryder Cortez MD;  Location: [facility] OR    CHOLECYSTECTOMY      COLONOSCOPY N/A 2024    Procedure: COLONOSCOPY, WITH POLYPECTOMY;  Surgeon: Jak Urbina MD;  Location: [facility]    CV RIGHT HEART CATH MEASUREMENTS RECORDED N/A 2024    Procedure: Right Heart Catheterization;  Surgeon: Alfred Tafoya MD;  Location:  HEART CARDIAC CATH LAB    IR LIVER BIOPSY PERCUTANEOUS  3/15/2024    TONSILLECTOMY      TRANSPLANT LIVER RECIPIENT  DONOR N/A 3/5/2024    Procedure: Transplant liver recipient  donor, bile duct stent placement;  Surgeon: Ryder Cortez MD;  Location: [facility] OR    VASECTOMY       acetaminophen (TYLENOL) 500 MG tablet  aspirin (ASA) 325 MG tablet  blood glucose (NO BRAND SPECIFIED) test strip  blood glucose monitoring (NO BRAND SPECIFIED) meter device kit  Continuous Blood Gluc Sensor (DEXCOM G7 SENSOR) MISC  Continuous Blood Gluc Sensor (DEXCOM G7 SENSOR) MISC  Glucagon (GVOKE HYPOPEN) 1 MG/0.2ML pen  hydrOXYzine HCl (ATARAX) 25 MG tablet  insulin aspart (NOVOLOG PEN) 100 UNIT/ML pen  Insulin Glargine-yfgn (SEMGLEE, YFGN,) 100 UNIT/ML SOLN  insulin pen needle (32G X 4 MM) 32G X 4 MM miscellaneous  insulin pen needle (BD PEN NEEDLE SHERRIE 2ND GEN) 32G X 4 MM  miscellaneous  ketoconazole (NIZORAL) 200 MG tablet  magnesium glycinate 100 MG CAPS capsule  magnesium oxide (MAG-OX) 400 MG tablet  melatonin 10 MG TABS tablet  multivitamin w/minerals (THERA-VIT-M) tablet  mycophenolate (GENERIC EQUIVALENT) 250 MG capsule  omeprazole (PRILOSEC) 20 MG DR capsule  ondansetron (ZOFRAN) 4 MG tablet  polyethylene glycol (MIRALAX) 17 GM/Dose powder  predniSONE (DELTASONE) 5 MG tablet  predniSONE (DELTASONE) 5 MG tablet  QUEtiapine (SEROQUEL) 25 MG tablet  senna-docusate (SENOKOT-S/PERICOLACE) 8.6-50 MG tablet  sulfamethoxazole-trimethoprim (BACTRIM) 400-80 MG tablet  tacrolimus (GENERIC EQUIVALENT) 1 MG capsule  thin (NO BRAND SPECIFIED) lancets  ursodiol (ACTIGALL) 300 MG capsule  valGANciclovir (VALCYTE) 450 MG tablet      Allergies   Allergen Reactions    Other Food Allergy Anaphylaxis     Legumes (black beans, baked beans, chickpeas)    Pineapple Itching     Family History  Family History   Problem Relation Age of Onset    Anxiety Disorder Mother     Depression Mother     Bipolar Disorder Mother     Chronic Obstructive Pulmonary Disease Mother     Lung Cancer Mother 81    Morbid Obesity Father     Diabetes Father     Diabetes Type 2  Brother     Substance Abuse Maternal Grandfather     Substance Abuse Paternal Grandfather     Colon Cancer No family hx of     Liver Disease No family hx of      Social History   Social History     Tobacco Use    Smoking status: Former     Types: Cigarettes     Passive exposure: Never    Smokeless tobacco: Current     Types: Chew     Last attempt to quit: 1/1/2004    Tobacco comments:     Chew daily 1/3 of a tin per day   Vaping Use    Vaping Use: Never used   Substance Use Topics    Alcohol use: Not Currently     Alcohol/week: 12.0 standard drinks of alcohol     Types: 12 Standard drinks or equivalent per week     Comment: Sober since 6/25/2023    Drug use: Not Currently      Past medical history, past surgical history, medications, allergies, family  history, and social history were reviewed with the patient. No additional pertinent items.      A medically appropriate review of systems was performed with pertinent positives and negatives noted in the HPI, and all other systems negative.    Physical Exam   BP: 102/69  Pulse: 85  Temp: 98  F (36.7  C)  Resp: 18  SpO2: 100 %  Physical Exam  General: Mild distress secondary to pain.  Laying on his left side.  Soft-spoken.  Eyes closed.  HENT: Normocephalic and atraumatic.   Eyes: EOMI. Conjunctivae normal.   Cardiovascular: Normal rate and regular rhythm.  Normal heart sounds. No murmur heard.  Pulmonary: No respiratory distress. Normal breath sounds.   Abdominal: There is no distension. Abdomen is soft. There is no mass.  Highly tender to right upper and right lower quadrant.  No rebound or guarding.  Normal bowel sounds.  Incisions without evidence of erythema, warmth, edema or point tenderness.  No CVA tenderness.  Musculoskeletal: No swelling or tenderness. Moving all extremities spontaneously.    Skin: Warm and dry  Neurological: No focal deficit present.   Mood and Affect: Mood normal.     ED Course, Procedures, & Data      Procedures               No results found for any visits on 03/30/24.  Medications - No data to display  Labs Ordered and Resulted from Time of ED Arrival to Time of ED Departure - No data to display  No orders to display          Critical care was not performed.     Medical Decision Making  The patient's presentation was of high complexity (an acute health issue posing potential threat to life or bodily function).  The patient's evaluation involved:  review of external note(s) from 3+ sources (see separate area of note for details)  review of 3+ test result(s) ordered prior to this encounter (see separate area of note for details)  ordering and/or review of 3+ test(s) in this encounter (see separate area of note for details)  independent interpretation of testing performed by another  health professional (see separate area of note for details)  discussion of management or test interpretation with another health professional (see separate area of note for details)    The patient's management necessitated high risk (a parenteral controlled substance) and high risk (a decision regarding hospitalization).  Patient care signed out    Assessment & Plan    Patient presents emergency department for chief complaint of postoperative abdominal pain in the setting of liver transplant 3/5/2024.      Patient is afebrile and vitals are unremarkable.  However patient appears to be in quite a lot of pain, and is highly tender to the right upper and right lower quadrant without peritoneal sign.  Differential diagnosis includes but is not limited to UTI, pyelonephritis, kidney stone, postoperative infection, abscess, surgical dehiscence, appendicitis.    Patient remained in the waiting room and we are not able to provide IV opioid medication without monitoring.  We trialed p.o. pain medication without relief.  CT abdomen concerning for complex rim-enhancing fluid collection in the abdomen.  no leukocytosis.  CG with NSR without peaked T waves or QRS widening.  Due to inpatient boarding in the emergency department, patient was sitting in the waiting room.  Plan is for emergent rooming, placing on the cardiac monitor.  Initiating hyperkalemia protocol.  IV Dilaudid, blood cultures collected and Zosyn initiated.    I spoke with the surgical resident who will evaluate the patient at bedside.  Liver transplant ultrasound pending.  Patient care signed out to oncoming physician to follow-up on final surgical recommendations and likely admission.    I have reviewed the nursing notes. I have reviewed the findings, diagnosis, plan and need for follow up with the patient.    New Prescriptions    No medications on file       Final diagnoses:   None         [facility] EMERGENCY DEPARTMENT  3/30/2024         [place]  MD Anitra  03/31/24 0027

## 2024-03-31 NOTE — ED TRIAGE NOTES
Pt had liver transplant a week ago, now having stabbing pain at incision site that comes and goes. Pain increases w activity.      Triage Assessment (Adult)       Row Name 03/30/24 4035          Triage Assessment    Airway WDL WDL        Respiratory WDL    Respiratory WDL WDL        Skin Circulation/Temperature WDL    Skin Circulation/Temperature WDL WDL        Cardiac WDL    Cardiac WDL WDL        Peripheral/Neurovascular WDL    Peripheral Neurovascular WDL WDL        Cognitive/Neuro/Behavioral WDL    Cognitive/Neuro/Behavioral WDL WDL

## 2024-03-31 NOTE — PHARMACY-ADMISSION MEDICATION HISTORY
Pharmacist Admission Medication History    Admission medication history is complete. The information provided in this note is only as accurate as the sources available at the time of the update.    Information Source(s): Patient, Family member, Hospital records, CareEverywhere/SureScripts, and Med-list  via in-person    Pertinent Information:   - Pt manages own medications and appears to be an accurate/reliable historian with help of wife. Able to confirm all medications on med-list handed to me by wife.  - Tacrolimus verified as PROGRAF Sandoz  - Confirmed insulin dosing below.  - Pt's pre-transplant meds were dcd prior and left off of the pta list below.    Changes made to PTA medication list:  Deleted:   Mag oxide tab  Ondansetron 4 mg tab  Prednisone 5 mg duplicate    Allergies reviewed with patient and updates made in EHR: yes    Medication History Completed By: Fernando Murphy [facility] 3/31/2024 10:33 AM    PTA Med List   Medication Sig Last Dose    acetaminophen (TYLENOL) 500 MG tablet Take 1-2 tablets (500-1,000 mg) by mouth every 6 hours as needed for mild pain Past Week at PRN    aspirin (ASA) 325 MG tablet 1 tablet (325 mg) by Oral or Feeding Tube route daily 3/30/2024    Glucagon (GVOKE HYPOPEN) 1 MG/0.2ML pen Inject the contents of 1 device under the skin into lower abdomen, outer thigh, or outer upper arm as needed for hypoglycemia. If no response after 15 minutes, additional 1 mg dose from a new device may be injected while waiting for emergency assistance. Past Month at prn    hydrOXYzine HCl (ATARAX) 25 MG tablet 1 tablet (25 mg) by Oral or Feeding Tube route every 6 hours as needed for other (adjuvant pain) Past Week at prn    insulin aspart (NOVOLOG PEN) 100 UNIT/ML pen Inject 1-10 Units Subcutaneous 3 times daily (before meals) Humalog 4 units with meals plus correction scale 1:50 >140 TID AC and 1:50 >200 HS Pre-meal Humalog correction scale: Do Not give Correction Insulin if Pre-Meal BG less than  140. For Pre-Meal  - 189 give 1 unit. For Pre-Meal  - 239 give 2 units. For Pre-Meal  - 289 give 3 units. For Pre-Meal  - 339 give 4 units. For Pre-Meal - 399 give 5 units. For Pre-Meal -449 give 6 units For Pre-Meal BG greater than or equal to 450 give 7 units. To be given with prandial insulin, and based on pre-meal blood glucose. Bedtime Humalog correction scale: Do Not give Bedtime Correction Insulin if BG less than 200. For  - 249 give 1 units. For  - 299 give 2 units. For  - 349 give 3 units. For  -399 give 4 units. For BG greater than or equal to 400 give 5 units. Notify provider if glucose greater than or equal to 350 mg/dL after administration of correction dose. 3/30/2024    Insulin Glargine-yfgn (SEMGLEE, YFGN,) 100 UNIT/ML SOLN Inject 23 Units Subcutaneous daily 3/30/2024    ketoconazole (NIZORAL) 200 MG tablet Take 0.5 tablets (100 mg) by mouth daily 3/30/2024    magnesium glycinate 100 MG CAPS capsule Take 2 capsules (200 mg) by mouth 3 times daily 3/30/2024    melatonin 10 MG TABS tablet Take 1 tablet (10 mg) by mouth nightly as needed for sleep 3/30/2024    multivitamin w/minerals (THERA-VIT-M) tablet TAKE 1 TABLET BY MOUTH DAILY 3/30/2024    mycophenolate (GENERIC EQUIVALENT) 250 MG capsule Take 3 capsules (750 mg) by mouth 2 times daily 3/30/2024    omeprazole (PRILOSEC) 20 MG DR capsule Take 1 capsule (20 mg) by mouth 2 times daily 3/30/2024    polyethylene glycol (MIRALAX) 17 GM/Dose powder Take 17 g by mouth daily 3/30/2024    predniSONE (DELTASONE) 5 MG tablet Take 1 tablet (5 mg) by mouth daily 3/30/2024    QUEtiapine (SEROQUEL) 25 MG tablet Take 1 tablet (25 mg) by mouth at bedtime 3/30/2024    senna-docusate (SENOKOT-S/PERICOLACE) 8.6-50 MG tablet 1-2 tablets by Oral or Feeding Tube route 2 times daily as needed for constipation 3/30/2024    sulfamethoxazole-trimethoprim (BACTRIM) 400-80 MG tablet Take 1 tablet by mouth every  evening 3/30/2024    tacrolimus (GENERIC EQUIVALENT) 1 MG capsule Take 5 capsules (5 mg) by mouth 2 times daily     ursodiol (ACTIGALL) 300 MG capsule Take 1 capsule (300 mg) by mouth 2 times daily 3/30/2024    valGANciclovir (VALCYTE) 450 MG tablet Take 1 tablet (450 mg) by mouth every evening 3/30/2024     Fernando Murphy Pharm.D., R.Ph., PGY1 Resident

## 2024-03-31 NOTE — PLAN OF CARE
Goal Outcome Evaluation:  Neuro: A&Ox4.   Cardiac: Soft BP 90/60's NS bolus 1L given.   Respiratory: RA  GI/: Adequate urine output. No BM   Diet/appetite: Tolerating diet. Denies nausea  Activity: Did not get out of bed today  Pain: C/O right abdomen pain. Gave dilaudid and oxycodone with fair relief.  Skin: Abdomen incision with staples RON  LDA's: PIV x2 left arm   Potassium 6.1-6.4. Shifted with iv iinsulin and Lokelma. Did not give D10 because blood sugar was 295 and 303.   Plan: Continue with POC. Notify primary team with changes.

## 2024-03-31 NOTE — CONSULTS
[facility]  Transplant Nephrology Consult  Date of Admission:  3/30/2024  Today's Date: 03/31/2024  Requesting physician: No att. providers found    Recommendations:  - Recommend volume repletion with 150mEq of sodium bicarb in free water due to hyperK+ and dehydration   - Would give Lokelma 15 grams x once.  - Agree with IR placing drain in fluid collection.  - Would avoid IV contrast, if possible due to ANGEL  - Recommend strict blood glucose control.     Assessment & Plan   # ANGEL: Trend up in creatinine.  Fair urine output.  No acute indications for dialysis.   - ANGEL felt secondary to high tacrolimus level, IV contrast and and dehydration.    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  Transaminases stable. Followed by transplant surgery     # Immunosuppression: Tacrolimus immediate release (goal 8-10) and Mycophenolate mofetil (dose 750 mg every 12 hours)   - Patient is in an immunosuppressed state and will continue to monitor for efficacy and toxicity of immunosuppression medications.   - Changes: Not at this time    # Infection Prophylaxis:   - PJP: Sulfa/TMP (Bactrim)  - CMV: Valganciclovir (Valcyte)    # Blood Pressure: Hypotensive;  Goal BP: > 100, but < 130 systolic   - Volume status: Hypovolemic   - Changes: Yes - recommend IVF  UA showed 16 hyaline casts which shows dehydration    # Type 2 Diabetes: Controlled (HbA1c <7%)           Last HbA1c: 4.8%              - Management as per primary team. Recommend tight Blood sugar control    # Anemia in Chronic Renal Disease: Hgb: Stable      MAITE: No   - Iron studies: Not checked recently    # Mineral Bone Disorder:    - Secondary renal hyperparathyroidism; PTH level: Not checked recently        On treatment: None  - Vitamin D; level: Not checked recently        On  supplement: No  - Calcium; level: Normal        On supplement: No  - Phosphorus; level: Normal        On supplement: No    # Electrolytes:   - Potassium; level: High        On supplement: No  - Magnesium; level: Low        On supplement: Yes. Should be restarted   - Bicarbonate; level: Low        On supplement: No. Recommend IV replacement  - Sodium; level: Low    # HyperK+: recommend 150mEq of sodium bicarb in free water.  Would also give Lokelma 15 grams and control hyperglycemia.    # Hematoma: noted previously on US and see on CT scan. Plan to have IR place drain.   CT scan 3/30/24: 1.  Complex, rim-enhancing right subhepatic fluid collection. Possibilities include postsurgical hematoma or biloma and given the peripheral enhancement superimposed infection suspected.  2.  Small amount of pelvic free fluid.  3.  Distal appendix is mildly prominent but there is no significant adjacent inflammation.  4.  Underdistended bladder. Wall thickening/cystitis not excluded.    # Transplant History:  Etiology of Kidney Failure: ANGEL  Tx: Liver Tx  Transplant: 3/5/2024 (Liver)    Significant changes in immunosuppression: None  Significant transplant-related complications: None    Recommendations were communicated to the primary team verbally.    Seen and discussed with Dr. Laura Mccord, NP  Pager: [phone]    Physician Attestation     I saw and evaluated Mary Anayan Jessica as part of a shared APRN/PA visit.     I personally reviewed the vital signs, medications, and labs.    I personally provided a substantive portion of care for this patient and I approve the care plan as written by the JOSELYN.  I was involved with Medical Decision Making including: Please see A&P for additional details of medical decision making.  MANAGEMENT DISCUSSED with the following over the past 24 hours: No acute indications for dialysis.  Recommend holding tacrolimus for high level.  Would treat hyperkalemia with IV sodium bicarbonate  and Lokelma.   Would avoid further nephrotoxic medications/IV contrast.    Flaco Sanchez MD  Date of Service (when I saw the patient): 24    REASON FOR CONSULT   ANGEL    History of Present Illness   Mary Lopez is a 53 year old male with history of ESLD secondary to ETOH cirrhosis c/b encephalopathy, hyponatremia, chronic thrombocytopenia, macrocytic anemia s/p a  donor liver transplant on 3/5/24.  Also with history of HTN and DM2.     He presented to the ED with significant abdominal pain. He subsequently had a CT scan with IV contrast.  The scan showed a fluid collection thought to be a hematoma.  IR has been consulted for a drain placement.     He was also found to have hyperK+, low bicarb and also hyperNa+ when in the ER.    He states minus his abdominal pain, he has overall felt well.  No fevers or chills.  No nausea or vomiting.     No urinary symptoms.      Review of Systems    The 10 point Review of Systems is negative other than noted in the HPI or here.     Past Medical History    I have reviewed this patient's medical history and updated it with pertinent information if needed.   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  Past Surgical History:   Procedure Laterality Date    BENCH LIVER  3/5/2024    Procedure: Bench liver;  Surgeon: Ryder Cortez MD;  Location: [facility] OR    CHOLECYSTECTOMY      COLONOSCOPY N/A 2024    Procedure: COLONOSCOPY, WITH POLYPECTOMY;  Surgeon: Jak Urbina MD;  Location: [facility]    CV RIGHT HEART CATH MEASUREMENTS RECORDED N/A 2024    Procedure: Right Heart Catheterization;  Surgeon: Alfred Tafoya MD;  Location:  HE[facility] CARDIAC CATH LAB    IR LIVER BIOPSY PERCUTANEOUS  3/15/2024    TONSILLECTOMY      TRANSPLANT LIVER RECIPIENT  DONOR N/A 3/5/2024    Procedure: Transplant liver recipient  donor, bile duct stent placement;  Surgeon: Ryder Cortez MD;  Location: [facility] OR    VASECTOMY         Family History   I have reviewed this patient's family history and updated it with pertinent information if needed.   Family History   Problem Relation Age of Onset    Anxiety Disorder Mother     Depression Mother     Bipolar Disorder Mother     Chronic Obstructive Pulmonary Disease Mother     Lung Cancer Mother 81    Morbid Obesity Father     Diabetes Father     Diabetes Type 2  Brother     Substance Abuse Maternal Grandfather     Substance Abuse Paternal Grandfather     Colon Cancer No family hx of     Liver Disease No family hx of        Social History   I have reviewed this patient's social history and updated it with pertinent information if needed. Mary Smith Jessica  reports that he has quit smoking. His smoking use included cigarettes. He has never been exposed to tobacco smoke. His smokeless tobacco use includes chew. He reports that he does not currently use alcohol after a past  "usage of about 12.0 standard drinks of alcohol per week. He reports that he does not currently use drugs.    Allergies   Allergies   Allergen Reactions    Other Food Allergy Anaphylaxis     Legumes (black beans, baked beans, chickpeas)    Pineapple Itching     Prior to Admission Medications    albuterol  10 mg Nebulization Once    dextrose 10%  300 mL Intravenous Once    insulin aspart  1-7 Units Subcutaneous TID AC    insulin aspart  1-5 Units Subcutaneous At Bedtime      sodium bicarbonate 150 mEq in sterile water (preservative free) 1,000 mL infusion         Physical Exam   Temp  Av.8  F (36.6  C)  Min: 97.5  F (36.4  C)  Max: 98  F (36.7  C)      Pulse  Av  Min: 69  Max: 85 Resp  Av.8  Min: 16  Max: 19  SpO2  Av.9 %  Min: 99 %  Max: 100 %     /75 (BP Location: Right arm, Patient Position: Sitting, Cuff Size: Adult Regular)   Pulse 70   Temp 97.5  F (36.4  C) (Oral)   Resp 16   Ht 1.727 m (5' 8\")   Wt 85.7 kg (188 lb 14.4 oz)   SpO2 100%   BMI 28.72 kg/m     Date 24 0700 - 24 0659   Shift 5407-8456 3473-2052 4282-1314 24 Hour Total   INTAKE   I.V. 50   50   IV Piggyback 1000   1000   Shift Total(mL/kg) 1050(12.25)   1050(12.25)   OUTPUT   Shift Total(mL/kg)       Weight (kg) 85.68 85.68 85.68 85.68      Admit Weight: 85.7 kg (188 lb 14.4 oz)     GENERAL APPEARANCE: alert and no distress  HENT: mouth without ulcers or lesions  RESP: lungs clear to auscultation - no rales, rhonchi or wheezes  CV: regular rhythm, normal rate, no rub, no murmur  EDEMA: no LE edema bilaterally  ABDOMEN: well-healed incisions RLQ tenderness  MS: extremities normal - no gross deformities noted, no evidence of inflammation in joints, no muscle tenderness  SKIN: no rash    Data   CMP  Recent Labs   Lab 24  0750 24  0706 24  0657 24  0632 24  0603 24  0602 24  0443 24  0406 24  0234 24  0157 24  0026 24  2258 24  0801 " 03/25/24 0846   NA  --   --   --   --   --  131*  --   --   --   --   --  132* 135 136   POTASSIUM  --   --  6.1*  --   --  6.6*  6.6*  --  6.6*  --  5.9*  --  6.6* 5.6* 5.0   CHLORIDE  --   --   --   --   --  104  --   --   --   --   --  104 104 103   CO2  --   --   --   --   --  18*  --   --   --   --   --  19* 24 25   ANIONGAP  --   --   --   --   --  9  --   --   --   --   --  9 7 8   * 168*  --  258* 181* 205*   < >  --    < >  --    < > 203* 164* 256*   BUN  --   --   --   --   --  36.1*  --   --   --   --   --  34.3* 15.3 15.1   CR  --   --   --   --   --  2.19*  --   --   --   --   --  2.00* 1.20* 0.93   GFRESTIMATED  --   --   --   --   --  35*  --   --   --   --   --  39* 73 >90   MEG  --   --   --   --   --  8.9  --   --   --   --   --  9.5 9.9 9.4   MAG  --   --   --   --   --   --   --   --   --   --   --   --  1.3* 1.5*   PHOS  --   --   --   --   --   --   --   --   --   --   --   --  4.5 3.8   PROTTOTAL  --   --   --   --   --  5.4*  --   --   --   --   --  6.7 6.5 6.0*   ALBUMIN  --   --   --   --   --  2.8*  --   --   --   --   --  3.5 3.5 3.3*   BILITOTAL  --   --   --   --   --  0.8  --   --   --   --   --  1.0 1.2 1.2   ALKPHOS  --   --   --   --   --  155*  --   --   --   --   --  191* 203* 235*   AST  --   --   --   --   --  17  --   --   --   --   --  19 27 30   ALT  --   --   --   --   --  13  --   --   --   --   --  23 35 55    < > = values in this interval not displayed.     CBC  Recent Labs   Lab 03/31/24  0657 03/30/24  2258 03/28/24  0801 03/25/24  0846   HGB 10.4* 11.3* 11.9* 11.8*   WBC 6.2 8.3 7.3 6.4   RBC 3.26* 3.65* 3.77* 3.76*   HCT 32.4* 35.4* 36.9* 36.7*   MCV 99 97 98 98   MCH 31.9 31.0 31.6 31.4   MCHC 32.1 31.9 32.2 32.2   RDW 17.0* 16.7* 17.1* 18.2*    283 357 431     INRNo lab results found in last 7 days.  ABGNo lab results found in last 7 days.   Urine Studies  Recent Labs   Lab Test 03/30/24 2248 03/04/24  1042 02/23/24  1558 02/15/24  2015  11/09/23  1147   COLOR Yellow Yellow Yellow Dark Yellow* Yellow   APPEARANCE Clear Clear Clear Clear Clear   URINEGLC Negative 500* Negative Negative >=1000*   URINEBILI Negative Negative Small* Small* Small*   URINEKETONE Negative Negative Negative Negative Negative   SG 1.021 1.007 1.009 1.014 <=1.005   UBLD Negative Negative Negative Negative Negative   URINEPH 5.0 5.0 5.0 5.0 6.0   PROTEIN Negative Negative Negative Negative Negative   UROBILINOGEN  --   --   --   --  2.0*   NITRITE Negative Negative Negative Negative Negative   LEUKEST Negative Negative Negative Negative Negative   RBCU 0 <1 <1 <1 0-2   WBCU 1 <1 <1 <1 0-5     No lab results found.  PTH  No lab results found.  Iron Studies  Recent Labs   Lab Test 12/19/23  0758 08/23/23  1018 08/09/23  1122 07/15/23  1048   IRON 135 120 118 92   KE 2,948* 4,261* 4,611*  --        IMAGING:  All imaging studies reviewed by me.

## 2024-03-31 NOTE — TELEPHONE ENCOUNTER
"Mary and Adina paged. When left hospital felt some soreness on right side near incision, since has had some pain, no pinching. Yesterday and today feeling more pain, shhooting pain about 5 inches below incision to right of belly button and down a bit. Occasionally sharp but usually present. Always in the same spot. Pain worse with movement. Minor when urinating, urine clear and yellow, maybe some increase in frequency.  No fever, no n/v. Has had some constipation, but took stool softeners and now having loose stools, \"like peeing out my butt\". Hard to get clear answers from him.     While on the phone with Adina and Mary, he had to get up quickly to urgently go to the bathroom. He stated while he away from the phone call he would have to stop part way through his exhales because the pain would get too bad.     While Mary was in the bathroom, spoke to Adina who stated that Mary hates hospitals and he said before they paged that he thought he needed to go in. Advised Adina that they should monitor pain for the time being and if it got worse or other symptoms developed, he should go to the ED. Mary got back on the call after that and said he thinks he needs to just go in.   "

## 2024-04-01 ENCOUNTER — APPOINTMENT (OUTPATIENT)
Dept: INTERVENTIONAL RADIOLOGY/VASCULAR | Facility: CLINIC | Age: 54
DRG: 920 | End: 2024-04-01
Attending: PHYSICIAN ASSISTANT
Payer: COMMERCIAL

## 2024-04-01 LAB
ALBUMIN SERPL BCG-MCNC: 2.5 G/DL (ref 3.5–5.2)
ALP SERPL-CCNC: 136 U/L (ref 40–150)
ALT SERPL W P-5'-P-CCNC: 8 U/L (ref 0–70)
ANION GAP SERPL CALCULATED.3IONS-SCNC: 8 MMOL/L (ref 7–15)
AST SERPL W P-5'-P-CCNC: 17 U/L (ref 0–45)
ATRIAL RATE - MUSE: 65 BPM
BILIRUB SERPL-MCNC: 0.6 MG/DL
BUN SERPL-MCNC: 30 MG/DL (ref 6–20)
CALCIUM SERPL-MCNC: 8.2 MG/DL (ref 8.6–10)
CHLORIDE SERPL-SCNC: 105 MMOL/L (ref 98–107)
CREAT SERPL-MCNC: 1.83 MG/DL (ref 0.67–1.17)
CYSTATIN C (ROCHE): 2 MG/L (ref 0.6–1)
DEPRECATED HCO3 PLAS-SCNC: 21 MMOL/L (ref 22–29)
DIASTOLIC BLOOD PRESSURE - MUSE: NORMAL MMHG
EGFRCR SERPLBLD CKD-EPI 2021: 44 ML/MIN/1.73M2
ERYTHROCYTE [DISTWIDTH] IN BLOOD BY AUTOMATED COUNT: 17 % (ref 10–15)
GFR SERPL CREATININE-BSD FRML MDRD: 32 ML/MIN/1.73M2
GLUCOSE BLDC GLUCOMTR-MCNC: 119 MG/DL (ref 70–99)
GLUCOSE BLDC GLUCOMTR-MCNC: 120 MG/DL (ref 70–99)
GLUCOSE BLDC GLUCOMTR-MCNC: 123 MG/DL (ref 70–99)
GLUCOSE BLDC GLUCOMTR-MCNC: 128 MG/DL (ref 70–99)
GLUCOSE BLDC GLUCOMTR-MCNC: 132 MG/DL (ref 70–99)
GLUCOSE BLDC GLUCOMTR-MCNC: 153 MG/DL (ref 70–99)
GLUCOSE BLDC GLUCOMTR-MCNC: 155 MG/DL (ref 70–99)
GLUCOSE BLDC GLUCOMTR-MCNC: 168 MG/DL (ref 70–99)
GLUCOSE BLDC GLUCOMTR-MCNC: 183 MG/DL (ref 70–99)
GLUCOSE BLDC GLUCOMTR-MCNC: 198 MG/DL (ref 70–99)
GLUCOSE BLDC GLUCOMTR-MCNC: 213 MG/DL (ref 70–99)
GLUCOSE BLDC GLUCOMTR-MCNC: 240 MG/DL (ref 70–99)
GLUCOSE BLDC GLUCOMTR-MCNC: 255 MG/DL (ref 70–99)
GLUCOSE BLDC GLUCOMTR-MCNC: 289 MG/DL (ref 70–99)
GLUCOSE BLDC GLUCOMTR-MCNC: 94 MG/DL (ref 70–99)
GLUCOSE SERPL-MCNC: 321 MG/DL (ref 70–99)
HBA1C MFR BLD: 5.6 %
HCT VFR BLD AUTO: 27.5 % (ref 40–53)
HGB BLD-MCNC: 8.9 G/DL (ref 13.3–17.7)
INTERPRETATION ECG - MUSE: NORMAL
MAGNESIUM SERPL-MCNC: 1.3 MG/DL (ref 1.7–2.3)
MCH RBC QN AUTO: 31.8 PG (ref 26.5–33)
MCHC RBC AUTO-ENTMCNC: 32.4 G/DL (ref 31.5–36.5)
MCV RBC AUTO: 98 FL (ref 78–100)
P AXIS - MUSE: 35 DEGREES
PHOSPHATE SERPL-MCNC: 4.5 MG/DL (ref 2.5–4.5)
PLATELET # BLD AUTO: 157 10E3/UL (ref 150–450)
POTASSIUM SERPL-SCNC: 5 MMOL/L (ref 3.4–5.3)
POTASSIUM SERPL-SCNC: 5 MMOL/L (ref 3.4–5.3)
POTASSIUM SERPL-SCNC: 5.1 MMOL/L (ref 3.4–5.3)
POTASSIUM SERPL-SCNC: 5.3 MMOL/L (ref 3.4–5.3)
POTASSIUM SERPL-SCNC: 5.3 MMOL/L (ref 3.4–5.3)
POTASSIUM SERPL-SCNC: 5.6 MMOL/L (ref 3.4–5.3)
POTASSIUM SERPL-SCNC: 5.7 MMOL/L (ref 3.4–5.3)
PR INTERVAL - MUSE: 142 MS
PROT SERPL-MCNC: 4.9 G/DL (ref 6.4–8.3)
QRS DURATION - MUSE: 90 MS
QT - MUSE: 406 MS
QTC - MUSE: 422 MS
R AXIS - MUSE: -7 DEGREES
RBC # BLD AUTO: 2.8 10E6/UL (ref 4.4–5.9)
SODIUM SERPL-SCNC: 134 MMOL/L (ref 135–145)
SYSTOLIC BLOOD PRESSURE - MUSE: NORMAL MMHG
T AXIS - MUSE: 39 DEGREES
TACROLIMUS BLD-MCNC: 8.6 UG/L (ref 5–15)
TME LAST DOSE: NORMAL H
TME LAST DOSE: NORMAL H
VENTRICULAR RATE- MUSE: 65 BPM
WBC # BLD AUTO: 5.8 10E3/UL (ref 4–11)

## 2024-04-01 PROCEDURE — 250N000011 HC RX IP 250 OP 636: Performed by: PHYSICIAN ASSISTANT

## 2024-04-01 PROCEDURE — 258N000003 HC RX IP 258 OP 636

## 2024-04-01 PROCEDURE — 84100 ASSAY OF PHOSPHORUS: CPT

## 2024-04-01 PROCEDURE — 36415 COLL VENOUS BLD VENIPUNCTURE: CPT

## 2024-04-01 PROCEDURE — 250N000012 HC RX MED GY IP 250 OP 636 PS 637: Performed by: PHYSICIAN ASSISTANT

## 2024-04-01 PROCEDURE — 84132 ASSAY OF SERUM POTASSIUM: CPT

## 2024-04-01 PROCEDURE — 250N000009 HC RX 250: Performed by: PHYSICIAN ASSISTANT

## 2024-04-01 PROCEDURE — 49406 IMAGE CATH FLUID PERI/RETRO: CPT

## 2024-04-01 PROCEDURE — 272N000192 HC ACCESSORY CR2

## 2024-04-01 PROCEDURE — 250N000013 HC RX MED GY IP 250 OP 250 PS 637: Performed by: PHYSICIAN ASSISTANT

## 2024-04-01 PROCEDURE — C1769 GUIDE WIRE: HCPCS

## 2024-04-01 PROCEDURE — 83735 ASSAY OF MAGNESIUM: CPT

## 2024-04-01 PROCEDURE — 250N000011 HC RX IP 250 OP 636: Performed by: STUDENT IN AN ORGANIZED HEALTH CARE EDUCATION/TRAINING PROGRAM

## 2024-04-01 PROCEDURE — 80053 COMPREHEN METABOLIC PANEL: CPT

## 2024-04-01 PROCEDURE — 87070 CULTURE OTHR SPECIMN AEROBIC: CPT | Performed by: PHYSICIAN ASSISTANT

## 2024-04-01 PROCEDURE — 83036 HEMOGLOBIN GLYCOSYLATED A1C: CPT | Performed by: PHYSICIAN ASSISTANT

## 2024-04-01 PROCEDURE — C1729 CATH, DRAINAGE: HCPCS

## 2024-04-01 PROCEDURE — 99152 MOD SED SAME PHYS/QHP 5/>YRS: CPT | Mod: GC | Performed by: STUDENT IN AN ORGANIZED HEALTH CARE EDUCATION/TRAINING PROGRAM

## 2024-04-01 PROCEDURE — 0D9W3ZZ DRAINAGE OF PERITONEUM, PERCUTANEOUS APPROACH: ICD-10-PCS | Performed by: STUDENT IN AN ORGANIZED HEALTH CARE EDUCATION/TRAINING PROGRAM

## 2024-04-01 PROCEDURE — 36415 COLL VENOUS BLD VENIPUNCTURE: CPT | Performed by: PHYSICIAN ASSISTANT

## 2024-04-01 PROCEDURE — 82610 CYSTATIN C: CPT | Performed by: PHYSICIAN ASSISTANT

## 2024-04-01 PROCEDURE — 80197 ASSAY OF TACROLIMUS: CPT

## 2024-04-01 PROCEDURE — 85027 COMPLETE CBC AUTOMATED: CPT

## 2024-04-01 PROCEDURE — 10160 PNXR ASPIR ABSC HMTMA BULLA: CPT | Mod: GC | Performed by: STUDENT IN AN ORGANIZED HEALTH CARE EDUCATION/TRAINING PROGRAM

## 2024-04-01 PROCEDURE — 120N000011 HC R&B TRANSPLANT UMMC

## 2024-04-01 PROCEDURE — 76942 ECHO GUIDE FOR BIOPSY: CPT | Mod: 26 | Performed by: STUDENT IN AN ORGANIZED HEALTH CARE EDUCATION/TRAINING PROGRAM

## 2024-04-01 PROCEDURE — 272N000500 HC NEEDLE CR2

## 2024-04-01 PROCEDURE — 99152 MOD SED SAME PHYS/QHP 5/>YRS: CPT

## 2024-04-01 RX ORDER — BUMETANIDE 0.25 MG/ML
2 INJECTION INTRAMUSCULAR; INTRAVENOUS ONCE
Qty: 8 ML | Refills: 0 | Status: COMPLETED | OUTPATIENT
Start: 2024-04-01 | End: 2024-04-01

## 2024-04-01 RX ORDER — MAGNESIUM SULFATE HEPTAHYDRATE 40 MG/ML
2 INJECTION, SOLUTION INTRAVENOUS ONCE
Status: COMPLETED | OUTPATIENT
Start: 2024-04-01 | End: 2024-04-01

## 2024-04-01 RX ORDER — FLUMAZENIL 0.1 MG/ML
0.2 INJECTION, SOLUTION INTRAVENOUS
Status: DISCONTINUED | OUTPATIENT
Start: 2024-04-01 | End: 2024-04-01

## 2024-04-01 RX ORDER — ONDANSETRON 2 MG/ML
4 INJECTION INTRAMUSCULAR; INTRAVENOUS
Status: DISCONTINUED | OUTPATIENT
Start: 2024-04-01 | End: 2024-04-01

## 2024-04-01 RX ORDER — FENTANYL CITRATE 50 UG/ML
25-50 INJECTION, SOLUTION INTRAMUSCULAR; INTRAVENOUS EVERY 5 MIN PRN
Status: DISCONTINUED | OUTPATIENT
Start: 2024-04-01 | End: 2024-04-01

## 2024-04-01 RX ORDER — DEXTROSE MONOHYDRATE 100 MG/ML
INJECTION, SOLUTION INTRAVENOUS CONTINUOUS PRN
Status: DISCONTINUED | OUTPATIENT
Start: 2024-04-01 | End: 2024-04-02 | Stop reason: HOSPADM

## 2024-04-01 RX ORDER — NICOTINE POLACRILEX 4 MG
15-30 LOZENGE BUCCAL
Status: DISCONTINUED | OUTPATIENT
Start: 2024-04-01 | End: 2024-04-01

## 2024-04-01 RX ORDER — NALOXONE HYDROCHLORIDE 0.4 MG/ML
0.4 INJECTION, SOLUTION INTRAMUSCULAR; INTRAVENOUS; SUBCUTANEOUS
Status: DISCONTINUED | OUTPATIENT
Start: 2024-04-01 | End: 2024-04-01

## 2024-04-01 RX ORDER — NALOXONE HYDROCHLORIDE 0.4 MG/ML
0.2 INJECTION, SOLUTION INTRAMUSCULAR; INTRAVENOUS; SUBCUTANEOUS
Status: DISCONTINUED | OUTPATIENT
Start: 2024-04-01 | End: 2024-04-01

## 2024-04-01 RX ORDER — SODIUM BICARBONATE 650 MG/1
1300 TABLET ORAL 2 TIMES DAILY
Status: DISCONTINUED | OUTPATIENT
Start: 2024-04-01 | End: 2024-04-02 | Stop reason: HOSPADM

## 2024-04-01 RX ORDER — TACROLIMUS 5 MG/1
5 CAPSULE ORAL
Status: DISCONTINUED | OUTPATIENT
Start: 2024-04-01 | End: 2024-04-02 | Stop reason: HOSPADM

## 2024-04-01 RX ORDER — AMOXICILLIN 250 MG
2 CAPSULE ORAL 2 TIMES DAILY
Status: DISCONTINUED | OUTPATIENT
Start: 2024-04-01 | End: 2024-04-02 | Stop reason: HOSPADM

## 2024-04-01 RX ORDER — DEXTROSE MONOHYDRATE 25 G/50ML
25-50 INJECTION, SOLUTION INTRAVENOUS
Status: DISCONTINUED | OUTPATIENT
Start: 2024-04-01 | End: 2024-04-01

## 2024-04-01 RX ORDER — MAGNESIUM OXIDE 400 MG/1
800 TABLET ORAL 2 TIMES DAILY
Status: DISCONTINUED | OUTPATIENT
Start: 2024-04-01 | End: 2024-04-02 | Stop reason: HOSPADM

## 2024-04-01 RX ORDER — POLYETHYLENE GLYCOL 3350 17 G/17G
17 POWDER, FOR SOLUTION ORAL DAILY PRN
Status: DISCONTINUED | OUTPATIENT
Start: 2024-04-01 | End: 2024-04-02 | Stop reason: HOSPADM

## 2024-04-01 RX ADMIN — HYDROMORPHONE HYDROCHLORIDE 0.2 MG: 0.2 INJECTION, SOLUTION INTRAMUSCULAR; INTRAVENOUS; SUBCUTANEOUS at 02:22

## 2024-04-01 RX ADMIN — SODIUM CHLORIDE: 9 INJECTION, SOLUTION INTRAVENOUS at 05:50

## 2024-04-01 RX ADMIN — Medication 1 TABLET: at 07:54

## 2024-04-01 RX ADMIN — POLYETHYLENE GLYCOL 3350 17 G: 17 POWDER, FOR SOLUTION ORAL at 07:54

## 2024-04-01 RX ADMIN — PANTOPRAZOLE SODIUM 40 MG: 40 TABLET, DELAYED RELEASE ORAL at 20:07

## 2024-04-01 RX ADMIN — PIPERACILLIN SODIUM AND TAZOBACTAM SODIUM 3.38 G: 3; .375 INJECTION, POWDER, LYOPHILIZED, FOR SOLUTION INTRAVENOUS at 16:25

## 2024-04-01 RX ADMIN — OXYCODONE HYDROCHLORIDE 5 MG: 5 TABLET ORAL at 16:35

## 2024-04-01 RX ADMIN — Medication 100 MG: at 10:00

## 2024-04-01 RX ADMIN — MYCOPHENOLATE MOFETIL 750 MG: 250 CAPSULE ORAL at 17:40

## 2024-04-01 RX ADMIN — MIDAZOLAM 1.5 MG: 1 INJECTION INTRAMUSCULAR; INTRAVENOUS at 15:19

## 2024-04-01 RX ADMIN — PIPERACILLIN SODIUM AND TAZOBACTAM SODIUM 3.38 G: 3; .375 INJECTION, POWDER, LYOPHILIZED, FOR SOLUTION INTRAVENOUS at 23:55

## 2024-04-01 RX ADMIN — SODIUM BICARBONATE 1300 MG: 650 TABLET ORAL at 20:07

## 2024-04-01 RX ADMIN — MAGNESIUM OXIDE TAB 400 MG (241.3 MG ELEMENTAL MG) 800 MG: 400 (241.3 MG) TAB at 12:55

## 2024-04-01 RX ADMIN — SODIUM CHLORIDE: 9 INJECTION, SOLUTION INTRAVENOUS at 01:30

## 2024-04-01 RX ADMIN — TACROLIMUS 5 MG: 5 CAPSULE ORAL at 17:40

## 2024-04-01 RX ADMIN — PIPERACILLIN SODIUM AND TAZOBACTAM SODIUM 3.38 G: 3; .375 INJECTION, POWDER, LYOPHILIZED, FOR SOLUTION INTRAVENOUS at 05:20

## 2024-04-01 RX ADMIN — MAGNESIUM OXIDE TAB 400 MG (241.3 MG ELEMENTAL MG) 400 MG: 400 (241.3 MG) TAB at 07:54

## 2024-04-01 RX ADMIN — PREDNISONE 5 MG: 5 TABLET ORAL at 07:54

## 2024-04-01 RX ADMIN — MAGNESIUM SULFATE IN WATER 2 G: 40 INJECTION, SOLUTION INTRAVENOUS at 12:20

## 2024-04-01 RX ADMIN — INSULIN ASPART 3 UNITS: 100 INJECTION, SOLUTION INTRAVENOUS; SUBCUTANEOUS at 07:57

## 2024-04-01 RX ADMIN — INSULIN HUMAN 1.5 UNITS/HR: 1 INJECTION, SOLUTION INTRAVENOUS at 10:00

## 2024-04-01 RX ADMIN — MAGNESIUM OXIDE TAB 400 MG (241.3 MG ELEMENTAL MG) 800 MG: 400 (241.3 MG) TAB at 20:07

## 2024-04-01 RX ADMIN — ASPIRIN 325 MG ORAL TABLET 325 MG: 325 PILL ORAL at 07:54

## 2024-04-01 RX ADMIN — SODIUM ZIRCONIUM CYCLOSILICATE 15 G: 10 POWDER, FOR SUSPENSION ORAL at 18:26

## 2024-04-01 RX ADMIN — HYDROXYZINE HYDROCHLORIDE 25 MG: 25 TABLET, FILM COATED ORAL at 16:35

## 2024-04-01 RX ADMIN — MYCOPHENOLATE MOFETIL 750 MG: 250 CAPSULE ORAL at 07:54

## 2024-04-01 RX ADMIN — BUMETANIDE 2 MG: 0.25 INJECTION INTRAMUSCULAR; INTRAVENOUS at 18:25

## 2024-04-01 RX ADMIN — FENTANYL CITRATE 75 MCG: 50 INJECTION, SOLUTION INTRAMUSCULAR; INTRAVENOUS at 15:19

## 2024-04-01 RX ADMIN — SENNOSIDES AND DOCUSATE SODIUM 2 TABLET: 8.6; 5 TABLET ORAL at 20:07

## 2024-04-01 RX ADMIN — PANTOPRAZOLE SODIUM 40 MG: 40 TABLET, DELAYED RELEASE ORAL at 07:54

## 2024-04-01 RX ADMIN — OXYCODONE HYDROCHLORIDE 5 MG: 5 TABLET ORAL at 10:00

## 2024-04-01 RX ADMIN — PIPERACILLIN SODIUM AND TAZOBACTAM SODIUM 3.38 G: 3; .375 INJECTION, POWDER, LYOPHILIZED, FOR SOLUTION INTRAVENOUS at 10:41

## 2024-04-01 RX ADMIN — VALGANCICLOVIR HYDROCHLORIDE 450 MG: 450 TABLET ORAL at 20:07

## 2024-04-01 NOTE — PROGRESS NOTES
Patient Name: Mary Lopez  Medical Record Number: [id number]  Today's Date: 4/1/2024    Procedure: Abdominal abscess drainage, possible drain placement.  Proceduralist: MD Beto  Pathology present: n/a    Procedure Start: 1507  Procedure end: 1520  Sedation medications administered: Fentanyl: 75 mcg Versed:1.5mg     Report given to: 7A, RN  : n/a    Other Notes: Pt arrived to IR room 1 from . Consent reviewed. Pt denies any questions or concerns regarding procedure. Pt positioned supine and monitored per protocol.  10 mls removed, sent to lab. Pt tolerated procedure without any noted complications. Pt transferred back to .    Alena Smith, BRIDGET

## 2024-04-01 NOTE — PROGRESS NOTES
Brief Transplant Surgery Cross Cover Note    Potassium increased to 6.3 from 6.1 despite shifting with insulin, giving lokelma 15 x2, and bumex. Spoke with RN, had not received albuterol prior to this last lab re-draw, all other interventions given and completed.    Spoke to on call nephrology about persistent hyperkalemia with recommendations as below:  - Repeat insulin shift  - Bumex 2mg IV   - 500NS bolus  - Switch bicarb infusion to NS at 150mL/hr given bicarb 22  - No additional lokelma to be given today  - Repeat K recheck at 0200  - If K is downtrending but still above normal range and hemodynamics allow, will repeat insulin, bumex, bolus  - If K continues to rise despite these interventions, will call nephrology for additional recommendations    Plan reviewed with transplant fellow and RN caring for the patient.    Jak Reis MD  General Surgery, PGY-1  [phone]

## 2024-04-01 NOTE — PROVIDER NOTIFICATION
7A [id number]  BUTCH Berrios   pt  @ 2200 before K+ shift. Do you want me to still start D10 bolus and give bedtime novolog w/ K+ shift insulin ? Margoth AMADOR [phone]    Hold off on starting D10 bolus and do not given insulin sliding scale d/t K+ shift including insulin and do not want to add in another variable.

## 2024-04-01 NOTE — PROVIDER NOTIFICATION
7A [id number] BUTCH Berrios - pt K+ came back 6.3. Let me know if intervention is needed. Thanks, Margoth AMADOR, [phone]    Response: K+ shift pt with calcium gluconate, insulin, and bumex. Pt also received 500mL NS bolus and now has NS running at 150mL/hr. Sodium bicarbonate infusion discontinued.

## 2024-04-01 NOTE — PROGRESS NOTES
[facility]  Transplant Nephrology progress note  Date of Admission:  3/30/2024  Today's Date: 04/01/2024  Requesting physician: No att. providers found    Recommendations:  -Start PO bicarb 1300 mg BID  -Ensure optimal bowel regimen for daily bowel movements  -Would keep normal saline at current rate of 100ml/hr  -Magnesium repletion per primary team  - Agree with IR placing drain in fluid collection.  - Would avoid IV contrast, if possible due to ANGEL  - Recommend strict blood glucose control.   -agree with holding bactrim for hyperK    Assessment & Plan   # ANGEL: Trend down in creatinine.  Fair urine output.  No acute indications for dialysis.   - ANGEL felt secondary to high tacrolimus level, IV contrast and and dehydration.    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  Transaminases stable. Followed by transplant surgery     # Immunosuppression: Tacrolimus immediate release (goal 8-10) and Mycophenolate mofetil (dose 750 mg every 12 hours)   - Patient is in an immunosuppressed state and will continue to monitor for efficacy and toxicity of immunosuppression medications.   - Changes: Not at this time    # Infection Prophylaxis:   - PJP: Sulfa/TMP (Bactrim), on hold  - CMV: Valganciclovir (Valcyte)    # Blood Pressure: Hypotensive;  Goal BP: > 100, but < 130 systolic   - Volume status: Hypovolemic   - Changes: Yes - c/w NS IVF  UA showed 16 hyaline casts which shows dehydration    # Type 2 Diabetes: Controlled (HbA1c <7%)           Last HbA1c: 4.8%              - Management as per primary team. Recommend tight Blood sugar control    # Anemia in Chronic Renal Disease: Hgb: Stable      MAITE: No   - Iron studies: Not checked recently    # Mineral Bone Disorder:    - Secondary renal hyperparathyroidism; PTH level: Not checked  recently        On treatment: None  - Vitamin D; level: Not checked recently        On supplement: No  - Calcium; level: Normal        On supplement: No  - Phosphorus; level: Normal        On supplement: No    # Electrolytes:   - Potassium; level: High        On supplement: No  - Magnesium; level: Low        On supplement: Yes. Should be restarted   - Bicarbonate; level: Low        On supplement:  -Start PO bicarb 1300 mg BID .   - Sodium; level: Normal    # HyperK+: recommend 150mEq of sodium bicarb in free water.  Would also give Lokelma 15 grams and control hyperglycemia.    # Hematoma: noted previously on US and see on CT scan. Plan to have IR place drain.   CT scan 3/30/24: 1.  Complex, rim-enhancing right subhepatic fluid collection. Possibilities include postsurgical hematoma or biloma and given the peripheral enhancement superimposed infection suspected.  2.  Small amount of pelvic free fluid.  3.  Distal appendix is mildly prominent but there is no significant adjacent inflammation.  4.  Underdistended bladder. Wall thickening/cystitis not excluded.    # Transplant History:  Etiology of Kidney Failure: ANGEL  Tx: Liver Tx  Transplant: 3/5/2024 (Liver)    Significant changes in immunosuppression: None  Significant transplant-related complications: None    Recommendations were communicated to the primary team verbally.    Seen and discussed with Dr. Laura Kirkpatrick MD    REASON FOR CONSULT   ANGEL    Interval events  Vitally stable   Good urine output, 1500 yesterday and 1 liter since midnight   Cr down to 1.8  Primary team contacted on call nephrology about persistent hyperkalemia that shifted to 5.0 with insulin shift, Bumex 2mg IV, 500NS bolus, NS at 150mL/hr given bicarb 22          Review of Systems    The 10 point Review of Systems is negative other than noted in the HPI or here.     Past Medical History    I have reviewed this patient's medical history and updated it with pertinent information if  needed.   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  Past Surgical History:   Procedure Laterality Date    BENCH LIVER  3/5/2024    Procedure: Bench liver;  Surgeon: Ryder Cortez MD;  Location: [facility] OR    CHOLECYSTECTOMY      COLONOSCOPY N/A 2024    Procedure: COLONOSCOPY, WITH POLYPECTOMY;  Surgeon: Jak Urbina MD;  Location: [facility]    CV RIGHT HEART CATH MEASUREMENTS RECORDED N/A 2024    Procedure: Right Heart Catheterization;  Surgeon: Alfred Tafoya MD;  Location:  HEART CARDIAC CATH LAB    IR LIVER BIOPSY PERCUTANEOUS  3/15/2024    TONSILLECTOMY      TRANSPLANT LIVER RECIPIENT  DONOR N/A 3/5/2024    Procedure: Transplant liver recipient  donor, bile duct stent placement;  Surgeon: Ryder Cortez MD;  Location: [facility] OR    VASECTOMY         Family History   I have reviewed this patient's family history and updated it with  pertinent information if needed.   Family History   Problem Relation Age of Onset    Anxiety Disorder Mother     Depression Mother     Bipolar Disorder Mother     Chronic Obstructive Pulmonary Disease Mother     Lung Cancer Mother 81    Morbid Obesity Father     Diabetes Father     Diabetes Type 2  Brother     Substance Abuse Maternal Grandfather     Substance Abuse Paternal Grandfather     Colon Cancer No family hx of     Liver Disease No family hx of        Social History   I have reviewed this patient's social history and updated it with pertinent information if needed. Mary Lopez  reports that he has quit smoking. His smoking use included cigarettes. He has never been exposed to tobacco smoke. His smokeless tobacco use includes chew. He reports that he does not currently use alcohol after a past usage of about 12.0 standard drinks of alcohol per week. He reports that he does not currently use drugs.    Allergies   Allergies   Allergen Reactions    Other Food Allergy Anaphylaxis     Legumes (black beans, baked beans, chickpeas)    Pineapple Itching     Prior to Admission Medications    aspirin  325 mg Oral or Feeding Tube Daily    ketoconazole  100 mg Oral Daily    magnesium oxide  800 mg Oral BID    multivitamin w/minerals  1 tablet Oral Daily    mycophenolate  750 mg Oral BID IS    pantoprazole  40 mg Oral BID    piperacillin-tazobactam  3.375 g Intravenous Q6H    polyethylene glycol  17 g Oral Daily    predniSONE  5 mg Oral Daily    [Held by provider] sulfamethoxazole-trimethoprim  1 tablet Oral QPM    [Held by provider] tacrolimus  3 mg Oral BID IS    valGANciclovir  450 mg Oral QPM      dextrose      insulin regular 1 Units/hr (24 1258)    sodium chloride 150 mL/hr at 24 0550       Physical Exam   Temp  Av.8  F (36.6  C)  Min: 97.5  F (36.4  C)  Max: 98  F (36.7  C)      Pulse  Av  Min: 69  Max: 85 Resp  Av.8  Min: 16  Max: 19  SpO2  Av.9 %  Min: 99 %  Max: 100 %   "   /76   Pulse 86   Temp 98.6  F (37  C) (Oral)   Resp 13   Ht 1.727 m (5' 8\")   Wt 85.7 kg (188 lb 14.4 oz)   SpO2 99%   BMI 28.72 kg/m     Date 03/31/24 0700 - 04/01/24 0659   Shift 3017-7615 8991-5010 5805-9466 24 Hour Total   INTAKE   I.V. 50   50   IV Piggyback 1000   1000   Shift Total(mL/kg) 1050(12.25)   1050(12.25)   OUTPUT   Shift Total(mL/kg)       Weight (kg) 85.68 85.68 85.68 85.68      Admit Weight: 85.7 kg (188 lb 14.4 oz)     GENERAL APPEARANCE: alert and no distress  HENT: mouth without ulcers or lesions  RESP: lungs clear to auscultation - no rales, rhonchi or wheezes  CV: regular rhythm, normal rate, no rub, no murmur  EDEMA: no LE edema bilaterally  ABDOMEN: well-healed incisions RLQ tenderness  MS: extremities normal - no gross deformities noted, no evidence of inflammation in joints, no muscle tenderness  SKIN: no rash    Data   CMP  Recent Labs   Lab 04/01/24  1408 04/01/24  1339 04/01/24  1258 04/01/24  1204 04/01/24  1108 04/01/24  0757 04/01/24  0605 04/01/24  0534 04/01/24  0157 04/01/24  0149 03/31/24  2110 03/31/24  2045 03/31/24  0603 03/31/24  0602 03/31/24  0026 03/30/24  2258 03/28/24  0801   NA  --   --   --   --   --   --   --  134*  --   --   --  132*  --  131*  --  132* 135   POTASSIUM  --  5.6*  --   --   --   --  5.1 5.3  5.3  --  5.0  --  6.3*  6.3*   < > 6.6*  6.6*   < > 6.6* 5.6*   CHLORIDE  --   --   --   --   --   --   --  105  --   --   --  103  --  104  --  104 104   CO2  --   --   --   --   --   --   --  21*  --   --   --  22  --  18*  --  19* 24   ANIONGAP  --   --   --   --   --   --   --  8  --   --   --  7  --  9  --  9 7   *  --  153* 155* 168*   < >  --  321*   < >  --    < > 218*   < > 205*   < > 203* 164*   BUN  --   --   --   --   --   --   --  30.0*  --   --   --  31.9*  --  36.1*  --  34.3* 15.3   CR  --   --   --   --   --   --   --  1.83*  --   --   --  1.98*  --  2.19*  --  2.00* 1.20*   GFRESTIMATED  --   --   --   --   --   --   -- "  44*  --   --   --  40*  --  35*  --  39* 73   MEG  --   --   --   --   --   --   --  8.2*  --   --   --  8.3*  --  8.9  --  9.5 9.9   MAG  --   --   --   --   --   --   --  1.3*  --   --   --  1.4*  --   --   --   --  1.3*   PHOS  --   --   --   --   --   --   --  4.5  --   --   --  4.5  --   --   --   --  4.5   PROTTOTAL  --   --   --   --   --   --   --  4.9*  --   --   --   --   --  5.4*  --  6.7 6.5   ALBUMIN  --   --   --   --   --   --   --  2.5*  --   --   --   --   --  2.8*  --  3.5 3.5   BILITOTAL  --   --   --   --   --   --   --  0.6  --   --   --   --   --  0.8  --  1.0 1.2   ALKPHOS  --   --   --   --   --   --   --  136  --   --   --   --   --  155*  --  191* 203*   AST  --   --   --   --   --   --   --  17  --   --   --   --   --  17  --  19 27   ALT  --   --   --   --   --   --   --  8  --   --   --   --   --  13  --  23 35    < > = values in this interval not displayed.     CBC  Recent Labs   Lab 04/01/24  0534 03/31/24  0657 03/30/24  2258 03/28/24  0801   HGB 8.9* 10.4* 11.3* 11.9*   WBC 5.8 6.2 8.3 7.3   RBC 2.80* 3.26* 3.65* 3.77*   HCT 27.5* 32.4* 35.4* 36.9*   MCV 98 99 97 98   MCH 31.8 31.9 31.0 31.6   MCHC 32.4 32.1 31.9 32.2   RDW 17.0* 17.0* 16.7* 17.1*    207 283 357     INR  Recent Labs   Lab 03/31/24  1339   INR 1.13   PTT 33     ABGNo lab results found in last 7 days.   Urine Studies  Recent Labs   Lab Test 03/30/24  2248 03/04/24  1042 02/23/24  1558 02/15/24  2015 11/09/23  1147   COLOR Yellow Yellow Yellow Dark Yellow* Yellow   APPEARANCE Clear Clear Clear Clear Clear   URINEGLC Negative 500* Negative Negative >=1000*   URINEBILI Negative Negative Small* Small* Small*   URINEKETONE Negative Negative Negative Negative Negative   SG 1.021 1.007 1.009 1.014 <=1.005   UBLD Negative Negative Negative Negative Negative   URINEPH 5.0 5.0 5.0 5.0 6.0   PROTEIN Negative Negative Negative Negative Negative   UROBILINOGEN  --   --   --   --  2.0*   NITRITE Negative Negative Negative  Negative Negative   LEUKEST Negative Negative Negative Negative Negative   RBCU 0 <1 <1 <1 0-2   WBCU 1 <1 <1 <1 0-5     No lab results found.  PTH  No lab results found.  Iron Studies  Recent Labs   Lab Test 12/19/23  0758 08/23/23  1018 08/09/23  1122 07/15/23  1048   IRON 135 120 118 92   KE 2,948* 4,261* 4,611*  --        IMAGING:  All imaging studies reviewed by me.

## 2024-04-01 NOTE — PROCEDURES
[facility]    Procedure: IR Procedure Note    Date/Time: 4/1/2024 3:27 PM    Performed by: Behzadi, Heshmatzadeh, MD  Authorized by: Donta Pérez MD  IR Fellow Physician: Ashkan Behzadi      UNIVERSAL PROTOCOL   Site Marked: NA  Prior Images Obtained and Reviewed:  Yes  Required items: Required blood products, implants, devices and special equipment available    Patient identity confirmed:  Verbally with patient, arm band, provided demographic data and hospital-assigned identification number  Patient was reevaluated immediately before administering moderate or deep sedation or anesthesia  Confirmation Checklist:  Patient's identity using two indicators, relevant allergies, procedure was appropriate and matched the consent or emergent situation and correct equipment/implants were available  Time out: Immediately prior to the procedure a time out was called    Universal Protocol: the Joint Commission Universal Protocol was followed    Preparation: Patient was prepped and draped in usual sterile fashion       ANESTHESIA    Anesthesia:  Local infiltration  Local Anesthetic:  Lidocaine 1% without epinephrine      SEDATION  Patient Sedated: Yes    Sedation:  Fentanyl and midazolam  Vital signs: Vital signs monitored during sedation    See dictated procedure note for full details.  Findings: -    Specimens: none    Complications: None    Condition: Stable    Plan: -      PROCEDURE  Describe Procedure: Successful drainage of the prehepatic fluid collection with removal of the 6-7 thick bloody fluid, likely noninfected-hematoma.     Samples sent for lab analysis.     Decision made to not place a drain.       Patient Tolerance:  Patient tolerated the procedure well with no immediate complications  Length of time physician/provider present for 1:1 monitoring during sedation: 15

## 2024-04-01 NOTE — PROVIDER NOTIFICATION
7A [id number] BUTCH Lopez   pt  @ 7294. Would you wanna do any interventions? otherwise he gets sliding scale @0730. Thanks, Margoth AMADOR, [phone]

## 2024-04-01 NOTE — CONSULTS
"      [facility] Consult Service Note  Interventional Radiology  24   9:06 AM    Consult Requested: \"Drain for perihepatic rim enhancing fluid collection, concern for bile leak\"    Recommendations/Plan:    Patient is approved for CT guided perihepatic fluid collection aspiration +/- drain. If fluid collection appears infected or if concerns for bile, team would like a drain placed. We have informed the team of all of the risks of this procedure: infecting a sterile fluid collection, causing increased bleeding given possibility of hematoma (possibility that this patient is having tamponade of the hematoma), decreased output given hematoma, etc.    Timing of procedure is TBD based on IR staffing/schedule and triage.  Please contact the IR charge RN at [phone] for estimated time of procedure.     Labs WNL for procedure.    Orders entered for procedure, NPO status, and pre procedure IV antibiotics (patient currently on zosyn).   Medications to be held include: Patient has been on ASA 325mg, however, has not been held.   Informed consent will be completed prior to procedure.     Case and imaging discussed with IR attending Dr. Farhan Beckett MD   Recommendations were reviewed with primary team, Lesley Wise PA-C *[phone]    History of Present Illness:  Mary Lopez is a 52 y/o M with a PMH of HTN, DMII, ESLD secondary to ETOH cirrhosis c/b encephalopathy, hyponatremia, chronic thrombocytopenia, macrocytic anemia s/p a  donor liver transplant on 3/5/24. Patient presented on 3/30/24 with abdominal pain. Pt found to have an ANGEL. On CT AP, patient was found to have a complex, rim-enhancing right subhepatic fluid collection. Possibilities include postsurgical hematoma or biloma and given the peripheral enhancement superimposed infection suspected. He has had no leukocytosis.  Hgb was 11.3, now 8.9. Patient has been afebrile and had no leukocytosis. Team is concerned that there may be a " "bile leak.    Diagnostic labs to be entered by: Lesley Wise PA-C     Pertinent Imaging Reviewed:     CT ABDOMEN pelvis with contrast 3/30/24:  IMPRESSION:   1.  Complex, rim-enhancing right subhepatic fluid collection. Possibilities include postsurgical hematoma or biloma and given the peripheral enhancement superimposed infection suspected.  2.  Small amount of pelvic free fluid.  3.  Distal appendix is mildly prominent but there is no significant adjacent inflammation.  4.  Underdistended bladder. Wall thickening/cystitis not excluded.    Expected date of discharge:  04/02/2024    Vitals:   /69 (BP Location: Right arm, Patient Position: Semi-Bob's, Cuff Size: Adult Regular)   Pulse 105   Temp 98.5  F (36.9  C) (Oral)   Resp 18   Ht 1.727 m (5' 8\")   Wt 85.7 kg (188 lb 14.4 oz)   SpO2 98%   BMI 28.72 kg/m      Pertinent Labs Reviewed:  CBC:  Lab Results   Component Value Date    WBC 5.8 04/01/2024    WBC 6.2 03/31/2024    WBC 8.3 03/30/2024    WBC 10.8 12/19/2006    WBC 7.1 11/29/2005     Lab Results   Component Value Date    HGB 8.9 04/01/2024    HGB 10.4 03/31/2024    HGB 11.3 03/30/2024    HGB 16.9 12/19/2006    HGB 15.3 11/29/2005     Lab Results   Component Value Date     04/01/2024     03/31/2024     03/30/2024     12/19/2006     11/29/2005    INR:  Lab Results   Component Value Date    INR 1.13 03/31/2024    PTT 33 03/31/2024          COVID Results:  COVID-19 Antibody Results, Testing for Immunity           No data to display              COVID-19 PCR Results          2/15/2024    20:17 2/23/2024    09:28 3/4/2024    10:37   COVID-19 PCR Results   SARS CoV2 PCR Positive  Negative  Negative    Cycle threshold 43.6       Potassium   Date Value Ref Range Status   04/01/2024 5.1 3.4 - 5.3 mmol/L Final   03/12/2022 3.8 3.4 - 5.3 mmol/L Final   07/05/2020 4.2 3.4 - 5.3 mmol/L Final     Potassium POCT   Date Value Ref Range Status   03/05/2024 4.1 3.4 - 5.3 " mmol/L Final          Gilda Fajardo PA-C  Interventional Radiology  Pager: [phone]

## 2024-04-01 NOTE — PRE-PROCEDURE
GENERAL PRE-PROCEDURE:   Procedure:  Intra-abdominal drainage and drain placement  Date/Time:  4/1/2024 2:58 PM    Verbal consent obtained?: Yes    Risks and benefits: Risks, benefits and alternatives were discussed    Consent given by:  Patient  Patient states understanding of procedure being performed: Yes    Patient's understanding of procedure matches consent: Yes    Procedure consent matches procedure scheduled: Yes    Expected level of sedation:  Moderate  Appropriately NPO:  Yes  ASA Class:  2  Mallampati  :  Grade 2- soft palate, base of uvula, tonsillar pillars, and portion of posterior pharyngeal wall visible  Lungs:  Lungs clear with good breath sounds bilaterally  Heart:  Normal heart sounds and rate  History & Physical reviewed:  History and physical reviewed and no updates needed  Statement of review:  I have reviewed the lab findings, diagnostic data, medications, and the plan for sedation

## 2024-04-01 NOTE — PROVIDER NOTIFICATION
7A [id number] ANDREA Berrios.   pt reports chest pain, chest feels heavy and hurts worse to take a deep breath. pt reports SOB. HR in the 120-130s. Let me know if intervention is needed. Thanks, Margoth AMADOR, [phone]    Jak came to assess pt at the bedside and EKG done.

## 2024-04-01 NOTE — PLAN OF CARE
"VS: /69 (BP Location: Right arm, Patient Position: Semi-Bob's, Cuff Size: Adult Regular)   Pulse 105   Temp 98.5  F (36.9  C) (Oral)   Resp 18   Ht 1.727 m (5' 8\")   Wt 85.7 kg (188 lb 14.4 oz)   SpO2 98%   BMI 28.72 kg/m       Cares: 1900 - 0730     Neuro: Aox4   VS: softer pressures and tachycardic   Cardiac: tachycardic up to the 130s - now HR low 100s. Softer pressures 100/60s. Reported chest pain.   Respiratory: on RA. SOB/MOE  GI/: voiding adequately w/out issues (2mg Bumex given), LBM 3/30  Skin: clamshell abdominal incision - steri strips RON    Diet: NPO since 0000   Labs: potassium: 6.3 --> K+ shifted --> recheck: 5.0  BG: ACHS / pt received frequent checks overnight d/t K+ shift (range: 186 - 255)    LDA: Left PIV - NS @ 150mL/hr, left PIV SL   Mobility: Ax1  Pain/Nausea: abdominal pain, denies nausea   PRN medications: IV dilaudid x1, atarax x1, oxy x1   Events: potassium 6.3 @ 2115 --> sodium bicarbonate IVF discontinued, 500mL NS bolus, NS @ 150mL/hr, 10 units of insulin, calcium gluconate, bumex 2mg, albuterol neb, and EKG: NSR  // pt reported chest pain, SOB, and HR in the 120-130s @ 2245 --> EKG done: sinus tachycardia // pt  --> one time dose 4 units of insulin given  Plan of Care: plan to go to IR possibly for drain placement. Continue with current POC and update MD with any changes.   "

## 2024-04-01 NOTE — PROGRESS NOTES
"Brief Transplant Surgery Cross Cover Note    I was paged by RN about chest pain, shortness of breath.    I evaluated patient at bedside. States that he has intermittent chest pain on and off for months. This episode tonight felt similar to prior episodes and has resolved in the few minutes between onset and evaluation. Describes as a twisting pain, left lower chest wall, nonradiating. Specifically denies any associated shortness of breath, nausea, lightheadedness, dizziness, palpitations. Did tell RN previously that he felt short of breath, this is not resolved. States that his pain has been well controlled this evening.    Overall, tells me that he feels well currently but is feeling overwhelmed by number of staff entering room and number of blood draws, meds given. He feels this is worsening his baseline anxiety. We have been correcting his potassium this evening and his workup/therapy has been quite involved.     BP 95/53 (BP Location: Right arm, Patient Position: Semi-Bob's, Cuff Size: Adult Regular)   Pulse (!) 129   Temp 98  F (36.7  C) (Oral)   Resp 18   Ht 1.727 m (5' 8\")   Wt 85.7 kg (188 lb 14.4 oz)   SpO2 94%   BMI 28.72 kg/m      Gen: anxious appearing  Pulm: NLB on RA  CV: tachycardic, regular rate  Chest wall: nontender  Abd: soft, moderately tender to palpation over RUQ. Incision is CDI    I/O: net +1.3L on the day, 1.5L UOP    EKG reviewed, sinus tach without significant change from prior    BMP from tonight reviewed:  Na 132, K 6.3 (shifting)    CXR reviewed, no acute pathology on my review    A/P: Sinus tachycardia, likely multifactorial but may be driven by anxiety, uncontrolled pain, and/or over diuresis. Will address anxiety this evening with usual PM meds and trend HR as we shift his potassium. If HR remains elevated and BP remains soft, will plan to give additional IVF tonight on top of recent 500NS bolus and 150mIVF rate as he may be hypovolemic.      Jak Reis MD  General " Surgery, PGY-1  [phone]

## 2024-04-01 NOTE — TELEPHONE ENCOUNTER
Spoke with spouse, Adina. Saturday patient reported severe pain - spoke with on-call RNCC. Pain worsened and patient went to ED and was admitted. They are waiting for providers to round to see what the plan is.     Informed we will reschedule Mary's appts for this week. Advised to keep surgery follow-up appt on 4/8. Message sent to scheduling.

## 2024-04-01 NOTE — PROGRESS NOTES
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 Admitted on 3/30/24 for ongoing care/monitoring of electrolyte abnormalities and plans for management of fluid collection.    Graft function:  S/p DBD liver transplant 3/5/24:   - AST/ALT 17/13, Tbili normal  -   - Platelet count 157  - INR 1.1  - patent vasculature on Liver US 3/30  - last HIDA 3/11, negative for leak  - on ursodiol 300mg BID    Subhepatic R lobe fluid collection:  - Last measured 16x14 cm on CT A/P 3/30 (from 13x14 3/6)  - Rim-enhancement suspicious for biloma vs abscess  - IR consulted for aspiration today, seems to be a hematoma, fluid samples pending.     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 Treated with Methylprednisolone 500mg x3 (3/16-3/18) followed by taper.     -Induction steroid taper per protocol; currently on 5 mg daily prednisone  -Tacrolimus 5 mg BID with ketoconazole boost (100 mg daily), goal level 8-10. Tac level 21 (0600 3/31). Held 2 doses and discontinued ketoconazole.   -Mycophenolate 750mg BID.  - Opportunistic infectious prophylaxis Valcyte x3 mos and Bactrim x6 months (holding Bactrim this admission for hyperkalemia, G6PD pending for possible Dapsone initiation).    Neuro:   - Hydroxyzine 25 mg q6h prn, tylenol prn, seroquel HS prn 25mg   - Dilaudid IV prn  - Oxy 5 mg q6h prn    Hematology:   - Hgb 9 from 11, ongoing fluid resuscitation  - Plt count normal.  - Daily  for hepatic artery  prophylaxis    Cardiorespiratory: Stable, no new oxygen requirements. History of b/l pleural effusions periop, improved prior to discharge.   - Frequent IS, OOB    GI/Nutrition: S/p feeding tube placement, removed 3/21. Tolerating regular diet prior to admission.   Diet: Low K+ diet.     Endocrine:   IDDM T2DM with steroid hyperglycemia:   - Endo consulted last admission  - Home discharge regimen: glargine 23U daily (held currently)  - Started insulin gtt with hyperglycemia    Fluid/Electrolytes:   Hypovolemia: s/p 1L NS 3/30, 2L 3/31  Hyperkalemia: shifted 3/30 with albuterol, bumex, insulin/dextrose, sodium bicarb gtt. K remains 5.6, continue IVF, give Bumex, Na bicarb and Lokelma. Transplant nephrology consulting.   - Trend K+ q4h until normalized    : No paris. Strict I/Os.     Infectious disease: Hold bactrim for elevated K. Continue Valcyte ppx.  - Zosyn q6h for possible perihepatic infection    Prophylaxis: DVT, fal    Disposition: 7A     JOSELYN/Fellow/Resident Provider: Lesley Wise PA-C [id number]     Faculty: Chris Garcia MD  _________________________________________________________________  Transplant History: Admitted 3/30/2024 for Abdominal pain, subhepatic fluid collection.  3/5/2024 (Liver), Postoperative day: 27     Interval History: Abdominal pain continues.     ROS:   A 10-point review of systems was negative except as noted above.    Meds:   aspirin  325 mg Oral or Feeding Tube Daily    bumetanide  2 mg Intravenous Once    magnesium oxide  800 mg Oral BID    multivitamin w/minerals  1 tablet Oral Daily    mycophenolate  750 mg Oral BID IS    pantoprazole  40 mg Oral BID    piperacillin-tazobactam  3.375 g Intravenous Q6H    polyethylene glycol  17 g Oral Daily    predniSONE  5 mg Oral Daily    senna-docusate  2 tablet Oral or Feeding Tube BID    sodium bicarbonate  1,300 mg Oral BID    sodium zirconium cyclosilicate  15 g Oral Once    [Held by provider] sulfamethoxazole-trimethoprim  1 tablet Oral  "QPM    tacrolimus  5 mg Oral BID IS    valGANciclovir  450 mg Oral QPM       Physical Exam:     Admit Weight: 85.7 kg (188 lb 14.4 oz)    Current vitals:   /70   Pulse 84   Temp 98.6  F (37  C) (Oral)   Resp 11   Ht 1.727 m (5' 8\")   Wt 85.7 kg (188 lb 14.4 oz)   SpO2 99%   BMI 28.72 kg/m           Vital sign ranges:    Temp:  [97.3  F (36.3  C)-98.6  F (37  C)] 98.6  F (37  C)  Pulse:  [] 84  Resp:  [11-30] 11  BP: ()/(53-80) 102/70  SpO2:  [89 %-99 %] 99 %  Patient Vitals for the past 24 hrs:   BP Temp Temp src Pulse Resp SpO2   04/01/24 1520 102/70 -- -- 84 11 99 %   04/01/24 1515 104/64 -- -- 84 13 98 %   04/01/24 1510 114/77 -- -- 85 18 98 %   04/01/24 1505 122/66 -- -- 85 16 (!) 89 %   04/01/24 1500 124/66 -- -- 85 13 93 %   04/01/24 1455 122/71 -- -- 84 12 92 %   04/01/24 1450 133/75 -- -- 84 30 96 %   04/01/24 1445 124/80 -- -- 85 18 96 %   04/01/24 1440 118/70 -- -- 84 13 95 %   04/01/24 1435 127/76 -- -- 86 13 99 %   04/01/24 1430 126/74 -- -- 84 16 93 %   04/01/24 1048 100/64 98.6  F (37  C) Oral 93 18 97 %   04/01/24 0500 101/69 98.5  F (36.9  C) Oral 105 18 98 %   04/01/24 0441 -- -- -- 107 -- --   04/01/24 0319 -- -- -- 112 -- --   04/01/24 0154 108/59 98.5  F (36.9  C) Oral 118 18 98 %   04/01/24 0139 -- -- -- 118 -- --   04/01/24 0100 -- -- -- 119 -- --   03/31/24 2336 -- -- -- (!) 126 -- 94 %   03/31/24 2242 95/53 98  F (36.7  C) Oral (!) 129 18 94 %   03/31/24 2111 -- -- -- -- -- 98 %   03/31/24 1722 94/63 97.3  F (36.3  C) Oral 81 16 97 %     General Appearance: uncomfortable  Skin: normal  Heart: regular rate and rhythm  Lungs: NLB on room air   Abdomen: Incision well-healing with dried steristrips in place. Trace bruising over lower quadrants. Localizes pain to R periumbilical area/RLQ.   : no paris  Extremities: mild tremor with movement of upper extremities  Neurologic: alert, oriented. Calm.     Data:   CMP  Recent Labs   Lab 04/01/24  1408 04/01/24  1339 " 04/01/24  1258 04/01/24  0757 04/01/24  0605 04/01/24  0534 03/31/24  2110 03/31/24  2045 03/31/24  0603 03/31/24  0602 03/31/24  0026 03/30/24  2258   NA  --   --   --   --   --  134*  --  132*  --  131*  --  132*   POTASSIUM  --  5.6*  --   --  5.1 5.3  5.3   < > 6.3*  6.3*   < > 6.6*  6.6*   < > 6.6*   CHLORIDE  --   --   --   --   --  105  --  103  --  104  --  104   CO2  --   --   --   --   --  21*  --  22  --  18*  --  19*   *  --  153*   < >  --  321*   < > 218*   < > 205*   < > 203*   BUN  --   --   --   --   --  30.0*  --  31.9*  --  36.1*  --  34.3*   CR  --   --   --   --   --  1.83*  --  1.98*  --  2.19*  --  2.00*   GFRESTIMATED  --   --   --   --   --  44*  --  40*  --  35*  --  39*   MEG  --   --   --   --   --  8.2*  --  8.3*  --  8.9  --  9.5   MAG  --   --   --   --   --  1.3*  --  1.4*  --   --   --   --    PHOS  --   --   --   --   --  4.5  --  4.5  --   --   --   --    LIPASE  --   --   --   --   --   --   --   --   --   --   --  5*   ALBUMIN  --   --   --   --   --  2.5*  --   --   --  2.8*  --  3.5   BILITOTAL  --   --   --   --   --  0.6  --   --   --  0.8  --  1.0   ALKPHOS  --   --   --   --   --  136  --   --   --  155*  --  191*   AST  --   --   --   --   --  17  --   --   --  17  --  19   ALT  --   --   --   --   --  8  --   --   --  13  --  23    < > = values in this interval not displayed.     CBC  Recent Labs   Lab 04/01/24  0924 04/01/24  0534 03/31/24  0657   HGB  --  8.9* 10.4*   WBC  --  5.8 6.2   PLT  --  157 207   A1C 5.6  --   --      COAGS  Recent Labs   Lab 03/31/24  1339   INR 1.13   PTT 33      Urinalysis  Recent Labs   Lab Test 03/30/24  2248 03/04/24  1042   COLOR Yellow Yellow   APPEARANCE Clear Clear   URINEGLC Negative 500*   URINEBILI Negative Negative   URINEKETONE Negative Negative   SG 1.021 1.007   UBLD Negative Negative   URINEPH 5.0 5.0   PROTEIN Negative Negative   NITRITE Negative Negative   LEUKEST Negative Negative   RBCU 0 <1   WBCU 1 <1      Virology:  Hepatitis C Antibody   Date Value Ref Range Status   03/04/2024 Nonreactive Nonreactive Final     Comment:     A nonreactive screening test result does not exclude the possibility of exposure to or infection with HCV. Nonreactive screening test results in individuals with prior exposure to HCV may be due to antibody levels below the limit of detection of this assay or lack of reactivity to the HCV antigens used in this assay. Patients with recent HCV infections (<3 months from time of exposure) may have false-negative HCV antibody results due to the time needed for seroconversion (average of 8 to 9 weeks).     Attestation:    The patient has been seen with team and evaluated by me.   Vital signs, labs, medications and orders were reviewed.   When obtained, diagnostic images were reviewed by me and interpreted as above.    The care plan was discussed with the multidisciplinary team and I agree with the findings and plan in this note, with any differences recorded in blue.    Immunosuppressive medication management was reviewed and adjusted as reflected in the note and orders.       Chris Garcia MD  Professor of Surgery

## 2024-04-02 VITALS
DIASTOLIC BLOOD PRESSURE: 69 MMHG | WEIGHT: 188.9 LBS | SYSTOLIC BLOOD PRESSURE: 111 MMHG | TEMPERATURE: 98.4 F | BODY MASS INDEX: 28.63 KG/M2 | RESPIRATION RATE: 17 BRPM | HEIGHT: 68 IN | OXYGEN SATURATION: 98 % | HEART RATE: 84 BPM

## 2024-04-02 PROBLEM — E87.5 HYPERKALEMIA: Status: ACTIVE | Noted: 2024-04-02

## 2024-04-02 LAB
ALBUMIN SERPL BCG-MCNC: 2.6 G/DL (ref 3.5–5.2)
ALP SERPL-CCNC: 131 U/L (ref 40–150)
ALT SERPL W P-5'-P-CCNC: 9 U/L (ref 0–70)
ANION GAP SERPL CALCULATED.3IONS-SCNC: 8 MMOL/L (ref 7–15)
AST SERPL W P-5'-P-CCNC: 23 U/L (ref 0–45)
ATRIAL RATE - MUSE: 129 BPM
ATRIAL RATE - MUSE: 89 BPM
BACTERIA PRT CULT: NO GROWTH
BILIRUB SERPL-MCNC: 0.8 MG/DL
BUN SERPL-MCNC: 16 MG/DL (ref 6–20)
CALCIUM SERPL-MCNC: 8.8 MG/DL (ref 8.6–10)
CHLORIDE SERPL-SCNC: 109 MMOL/L (ref 98–107)
CREAT SERPL-MCNC: 1.28 MG/DL (ref 0.67–1.17)
DEPRECATED HCO3 PLAS-SCNC: 23 MMOL/L (ref 22–29)
DIASTOLIC BLOOD PRESSURE - MUSE: NORMAL MMHG
DIASTOLIC BLOOD PRESSURE - MUSE: NORMAL MMHG
EGFRCR SERPLBLD CKD-EPI 2021: 67 ML/MIN/1.73M2
ERYTHROCYTE [DISTWIDTH] IN BLOOD BY AUTOMATED COUNT: 16.5 % (ref 10–15)
G6PD RBC-CCNT: 15.5 U/G HB
GLUCOSE BLDC GLUCOMTR-MCNC: 100 MG/DL (ref 70–99)
GLUCOSE BLDC GLUCOMTR-MCNC: 104 MG/DL (ref 70–99)
GLUCOSE BLDC GLUCOMTR-MCNC: 108 MG/DL (ref 70–99)
GLUCOSE BLDC GLUCOMTR-MCNC: 115 MG/DL (ref 70–99)
GLUCOSE BLDC GLUCOMTR-MCNC: 168 MG/DL (ref 70–99)
GLUCOSE BLDC GLUCOMTR-MCNC: 186 MG/DL (ref 70–99)
GLUCOSE BLDC GLUCOMTR-MCNC: 238 MG/DL (ref 70–99)
GLUCOSE BLDC GLUCOMTR-MCNC: 83 MG/DL (ref 70–99)
GLUCOSE BLDC GLUCOMTR-MCNC: 90 MG/DL (ref 70–99)
GLUCOSE BLDC GLUCOMTR-MCNC: 90 MG/DL (ref 70–99)
GLUCOSE BLDC GLUCOMTR-MCNC: 91 MG/DL (ref 70–99)
GLUCOSE BLDC GLUCOMTR-MCNC: 94 MG/DL (ref 70–99)
GLUCOSE SERPL-MCNC: 107 MG/DL (ref 70–99)
HCT VFR BLD AUTO: 30.2 % (ref 40–53)
HGB BLD-MCNC: 9.9 G/DL (ref 13.3–17.7)
INTERPRETATION ECG - MUSE: NORMAL
INTERPRETATION ECG - MUSE: NORMAL
MAGNESIUM SERPL-MCNC: 1.6 MG/DL (ref 1.7–2.3)
MCH RBC QN AUTO: 31.7 PG (ref 26.5–33)
MCHC RBC AUTO-ENTMCNC: 32.8 G/DL (ref 31.5–36.5)
MCV RBC AUTO: 97 FL (ref 78–100)
P AXIS - MUSE: 21 DEGREES
P AXIS - MUSE: 29 DEGREES
PHOSPHATE SERPL-MCNC: 3.1 MG/DL (ref 2.5–4.5)
PLATELET # BLD AUTO: 183 10E3/UL (ref 150–450)
POTASSIUM SERPL-SCNC: 4.3 MMOL/L (ref 3.4–5.3)
POTASSIUM SERPL-SCNC: 4.3 MMOL/L (ref 3.4–5.3)
POTASSIUM SERPL-SCNC: 4.5 MMOL/L (ref 3.4–5.3)
POTASSIUM SERPL-SCNC: 4.6 MMOL/L (ref 3.4–5.3)
PR INTERVAL - MUSE: 130 MS
PR INTERVAL - MUSE: 142 MS
PROT SERPL-MCNC: 5.2 G/DL (ref 6.4–8.3)
QRS DURATION - MUSE: 82 MS
QRS DURATION - MUSE: 84 MS
QT - MUSE: 294 MS
QT - MUSE: 356 MS
QTC - MUSE: 430 MS
QTC - MUSE: 433 MS
R AXIS - MUSE: -12 DEGREES
R AXIS - MUSE: -17 DEGREES
RBC # BLD AUTO: 3.12 10E6/UL (ref 4.4–5.9)
SODIUM SERPL-SCNC: 140 MMOL/L (ref 135–145)
SYSTOLIC BLOOD PRESSURE - MUSE: NORMAL MMHG
SYSTOLIC BLOOD PRESSURE - MUSE: NORMAL MMHG
T AXIS - MUSE: 31 DEGREES
T AXIS - MUSE: 66 DEGREES
VENTRICULAR RATE- MUSE: 129 BPM
VENTRICULAR RATE- MUSE: 89 BPM
WBC # BLD AUTO: 5.3 10E3/UL (ref 4–11)

## 2024-04-02 PROCEDURE — 80053 COMPREHEN METABOLIC PANEL: CPT

## 2024-04-02 PROCEDURE — 250N000012 HC RX MED GY IP 250 OP 636 PS 637: Performed by: PHYSICIAN ASSISTANT

## 2024-04-02 PROCEDURE — 84100 ASSAY OF PHOSPHORUS: CPT

## 2024-04-02 PROCEDURE — 36415 COLL VENOUS BLD VENIPUNCTURE: CPT

## 2024-04-02 PROCEDURE — 250N000013 HC RX MED GY IP 250 OP 250 PS 637: Performed by: PHYSICIAN ASSISTANT

## 2024-04-02 PROCEDURE — 83735 ASSAY OF MAGNESIUM: CPT

## 2024-04-02 PROCEDURE — 258N000003 HC RX IP 258 OP 636: Performed by: PHYSICIAN ASSISTANT

## 2024-04-02 PROCEDURE — 99233 SBSQ HOSP IP/OBS HIGH 50: CPT | Mod: GC | Performed by: INTERNAL MEDICINE

## 2024-04-02 PROCEDURE — 85027 COMPLETE CBC AUTOMATED: CPT

## 2024-04-02 PROCEDURE — 84132 ASSAY OF SERUM POTASSIUM: CPT

## 2024-04-02 PROCEDURE — 250N000011 HC RX IP 250 OP 636: Performed by: PHYSICIAN ASSISTANT

## 2024-04-02 RX ORDER — MAGNESIUM OXIDE 400 MG/1
800 TABLET ORAL 2 TIMES DAILY
Qty: 120 TABLET | Refills: 11 | Status: SHIPPED | OUTPATIENT
Start: 2024-04-02 | End: 2024-04-18 | Stop reason: SINTOL

## 2024-04-02 RX ORDER — OXYCODONE HYDROCHLORIDE 5 MG/1
5 TABLET ORAL EVERY 6 HOURS PRN
Qty: 10 TABLET | Refills: 0 | Status: SHIPPED | OUTPATIENT
Start: 2024-04-02 | End: 2024-04-10

## 2024-04-02 RX ORDER — DEXTROSE MONOHYDRATE 25 G/50ML
25-50 INJECTION, SOLUTION INTRAVENOUS
Status: DISCONTINUED | OUTPATIENT
Start: 2024-04-02 | End: 2024-04-02

## 2024-04-02 RX ORDER — INSULIN GLARGINE-YFGN 100 [IU]/ML
5 INJECTION, SOLUTION SUBCUTANEOUS DAILY
Status: SHIPPED
Start: 2024-04-02 | End: 2024-06-18

## 2024-04-02 RX ORDER — HYDROXYZINE HYDROCHLORIDE 25 MG/1
25 TABLET, FILM COATED ORAL EVERY 6 HOURS PRN
Qty: 20 TABLET | Refills: 0 | Status: SHIPPED | OUTPATIENT
Start: 2024-04-02 | End: 2024-04-26

## 2024-04-02 RX ORDER — NICOTINE POLACRILEX 4 MG
15-30 LOZENGE BUCCAL
Status: DISCONTINUED | OUTPATIENT
Start: 2024-04-02 | End: 2024-04-02

## 2024-04-02 RX ORDER — SODIUM BICARBONATE 650 MG/1
1300 TABLET ORAL 2 TIMES DAILY
Qty: 120 TABLET | Refills: 3 | Status: SHIPPED | OUTPATIENT
Start: 2024-04-02 | End: 2024-04-11

## 2024-04-02 RX ADMIN — PIPERACILLIN SODIUM AND TAZOBACTAM SODIUM 3.38 G: 3; .375 INJECTION, POWDER, LYOPHILIZED, FOR SOLUTION INTRAVENOUS at 09:56

## 2024-04-02 RX ADMIN — HYDROXYZINE HYDROCHLORIDE 25 MG: 25 TABLET, FILM COATED ORAL at 13:46

## 2024-04-02 RX ADMIN — PANTOPRAZOLE SODIUM 40 MG: 40 TABLET, DELAYED RELEASE ORAL at 07:46

## 2024-04-02 RX ADMIN — SENNOSIDES AND DOCUSATE SODIUM 2 TABLET: 8.6; 5 TABLET ORAL at 07:46

## 2024-04-02 RX ADMIN — ASPIRIN 325 MG ORAL TABLET 325 MG: 325 PILL ORAL at 07:46

## 2024-04-02 RX ADMIN — PREDNISONE 5 MG: 5 TABLET ORAL at 07:47

## 2024-04-02 RX ADMIN — OXYCODONE HYDROCHLORIDE 5 MG: 5 TABLET ORAL at 07:46

## 2024-04-02 RX ADMIN — MAGNESIUM OXIDE TAB 400 MG (241.3 MG ELEMENTAL MG) 800 MG: 400 (241.3 MG) TAB at 13:46

## 2024-04-02 RX ADMIN — SODIUM CHLORIDE: 9 INJECTION, SOLUTION INTRAVENOUS at 05:58

## 2024-04-02 RX ADMIN — OXYCODONE HYDROCHLORIDE 5 MG: 5 TABLET ORAL at 13:46

## 2024-04-02 RX ADMIN — MYCOPHENOLATE MOFETIL 750 MG: 250 CAPSULE ORAL at 07:46

## 2024-04-02 RX ADMIN — TACROLIMUS 5 MG: 5 CAPSULE ORAL at 07:46

## 2024-04-02 RX ADMIN — SODIUM BICARBONATE 1300 MG: 650 TABLET ORAL at 07:46

## 2024-04-02 RX ADMIN — PIPERACILLIN SODIUM AND TAZOBACTAM SODIUM 3.38 G: 3; .375 INJECTION, POWDER, LYOPHILIZED, FOR SOLUTION INTRAVENOUS at 05:05

## 2024-04-02 RX ADMIN — Medication 1 TABLET: at 07:46

## 2024-04-02 RX ADMIN — HYDROXYZINE HYDROCHLORIDE 25 MG: 25 TABLET, FILM COATED ORAL at 07:46

## 2024-04-02 ASSESSMENT — ACTIVITIES OF DAILY LIVING (ADL)
ADLS_ACUITY_SCORE: 35
DEPENDENT_IADLS:: TRANSPORTATION
ADLS_ACUITY_SCORE: 35

## 2024-04-02 NOTE — PLAN OF CARE
DISCHARGE:  Patient with orders to discharge to Home.     Discharge instructions, medications & follow ups reviewed with patient and wife. Copy of discharge summary given to wife. PIVs removed.    Patient in stable condition. AVSS. Patient and wife had no further questions regarding discharge instructions and medications. Patient transferred out by wheelchair & left with transport to [facility].

## 2024-04-02 NOTE — DISCHARGE SUMMARY
[facility]    Discharge Summary  Transplant Surgery    Date of Admission:  3/30/2024  Date of Discharge:  4/2/2024  2:14 PM  Discharging Provider: Franchesca Aguilera PA-C/Dr. 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 Admitted on 3/30/24 for ongoing care/monitoring of electrolyte abnormalities and plans for management of fluid collection.    Hospital Course     Graft function:  S/p DBD liver transplant 3/5/24:   - AST/ALT 23/9, Tbili normal  -   - Platelet count 183  - INR 1.1  - patent vasculature on Liver US 3/30  - last HIDA 3/11, negative for leak  - on ursodiol 300mg BID     Subhepatic R lobe fluid collection:  - Last measured 16x14 cm on CT A/P 3/30 (from 13x14 3/6)  - Rim-enhancement suspicious for biloma vs abscess  - IR consulted for aspiration 4/1, seems to be a hematoma, gram stain negative, will continue to follow up on cultures as an outpatient.      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 Treated with Methylprednisolone 500mg x3 (3/16-3/18) followed by taper.      -Induction steroid  taper per protocol; currently on 5 mg daily prednisone  -Tacrolimus 5 mg BID with ketoconazole boost (100 mg daily), goal level 8-10. Tac level 21 (0600 3/31). Held 2 doses and discontinued ketoconazole.   -Mycophenolate 750mg BID.  - Opportunistic infectious prophylaxis Valcyte x3 mos and Bactrim x6 months (held Bactrim this admission for hyperkalemia, will resume on discharge with resolution of ANGEL).     Neuro:   - Hydroxyzine 25 mg q6h prn, tylenol prn  - Oxy 5 mg q6h prn     Hematology:   - Hgb drop likely 2/2 ongoing fluid resuscitation, stable ~ 10 prior to discharge  - Plt count normal.  - Daily  for hepatic artery prophylaxis     Cardiorespiratory: Stable, no new oxygen requirements. History of b/l pleural effusions periop, improved prior to discharge.   - Frequent IS, OOB     GI/Nutrition: S/p feeding tube placement, removed 3/21. Tolerating regular diet prior to admission.   Diet: Low K+ diet.      Endocrine:   IDDM T2DM with steroid hyperglycemia:   - Endo consulted last admission  - PTA regimen: glargine 23U daily (held on admission, was restarted on 5 units prior to discharge based on usage on the insulin drip.   - Started insulin gtt with hyperglycemia     Fluid/Electrolytes:   Hypovolemia: s/p 1L NS 3/30, 2L 3/31  Hyperkalemia: shifted 3/30 with albuterol, bumex, insulin/dextrose, sodium bicarb gtt. Also gave IVF, Bumex, Na bicarb and Lokelma. Transplant nephrology consulting. K normalized and was 4.3 on the day of discharge     Infectious disease: Hold bactrim for elevated K (restarted on discharge). Continue Valcyte ppx.  - Zosyn q6h for possible perihepatic infection, stopped prior to discharge with negative gram stain.     Transplant coordinator Lore Mckeon [phone].  Biliary stent:  yes  Donor status:  DBD  CMV D - / R -  EBV D + / R +  Anticoagulation plan: ASA 325mg x 6months     Discharge Disposition   Discharged to home   Condition at discharge: Stable    Pending Results   These  results will be followed up by transplant team  Unresulted Labs Ordered in the Past 30 Days of this Admission       Date and Time Order Name Status Description    3/31/2024 10:21 AM Abscess Aerobic Bacterial Culture Routine With Gram Stain Preliminary     3/31/2024 12:06 AM Blood Culture Peripheral Blood Preliminary     3/31/2024 12:06 AM Blood Culture Peripheral Blood Preliminary     3/5/2024  2:20 PM Prepare pheresed platelets (unit) Preliminary     3/5/2024  2:20 PM Prepare pheresed platelets (unit) Preliminary           Primary Care Physician   Jimmie Hoyt    Physical Exam   Temp: 98.4  F (36.9  C) Temp src: Oral BP: 111/69 Pulse: 84   Resp: 17 SpO2: 98 % O2 Device: None (Room air)    Vitals:    03/31/24 0500   Weight: 85.7 kg (188 lb 14.4 oz)     Vital Signs with Ranges  Temp:  [98.2  F (36.8  C)-98.9  F (37.2  C)] 98.4  F (36.9  C)  Pulse:  [79-88] 84  Resp:  [10-18] 17  BP: (111-125)/(69-80) 111/69  SpO2:  [96 %-99 %] 98 %  I/O last 3 completed shifts:  In: -   Out: 2620 [Urine:2620]    Constitutional: Awake, alert, cooperative, no apparent distress, and appears stated age.  Eyes: Lids and lashes normal, pupils equal, round, extra ocular muscles intact, sclera clear, conjunctiva normal.  ENT: Normocephalic, without obvious abnormality, atraumatic  Respiratory: Nonlabored resps on RA  Cardiovascular: RRR  GI: Soft, non-distended, non-tender, incision c/d/i  Genitourinary:  Voiding  Skin: No bruising or bleeding, normal skin color, texture  Neurologic: Awake, alert, oriented to name, place and time.    Neuropsychiatric: Calm, normal eye contact, alert, normal affect, oriented to self, place, time and situation, memory for past and recent events intact and thought process normal.    Consultations This Hospital Stay   NURSING TO CONSULT FOR VASCULAR ACCESS CARE IP CONSULT  NURSING TO CONSULT FOR VASCULAR ACCESS CARE IP CONSULT  INTERVENTIONAL RADIOLOGY ADULT/PEDS IP CONSULT  PHARMACY IP CONSULT  CARE  MANAGEMENT / SOCIAL WORK IP CONSULT    Time Spent on this Encounter   I have spent greater than 30 minutes on this discharge.    Discharge Orders   Discharge Medications   Discharge Medication List as of 4/2/2024  1:26 PM        START taking these medications    Details   oxyCODONE (ROXICODONE) 5 MG tablet Take 1 tablet (5 mg) by mouth every 6 hours as needed for moderate pain Wean as able, Disp-10 tablet, R-0, E-Prescribe      sodium bicarbonate 650 MG tablet Take 2 tablets (1,300 mg) by mouth 2 times daily, Disp-120 tablet, R-3, E-Prescribe           CONTINUE these medications which have CHANGED    Details   hydrOXYzine HCl (ATARAX) 25 MG tablet Take 1 tablet (25 mg) by mouth every 6 hours as needed for other (adjuvant pain), Disp-20 tablet, R-0, E-Prescribe      Insulin Glargine-yfgn (SEMGLEE, YFGN,) 100 UNIT/ML SOLN Inject 5 Units Subcutaneous daily, No Print Out      magnesium oxide (MAG-OX) 400 MG tablet Take 2 tablets (800 mg) by mouth 2 times daily, Disp-120 tablet, R-11, E-Prescribe           CONTINUE these medications which have NOT CHANGED    Details   acetaminophen (TYLENOL) 500 MG tablet Take 1-2 tablets (500-1,000 mg) by mouth every 6 hours as needed for mild pain, Disp-30 tablet, R-0, E-Prescribe      aspirin (ASA) 325 MG tablet 1 tablet (325 mg) by Oral or Feeding Tube route daily, Disp-30 tablet, R-5, E-Prescribe      blood glucose (NO BRAND SPECIFIED) test strip Use to test blood sugar two times daily or as directed. To accompany: Blood Glucose Monitor Brands: per insurance., Disp-100 strip, R-6, E-Prescribe      blood glucose monitoring (NO BRAND SPECIFIED) meter device kit Use to test blood sugar twice times daily or as directed. Preferred blood glucose meter OR supplies to accompany: Blood Glucose Monitor Brands: per insurance.Disp-1 kit, [id number]      !! Continuous Blood Gluc Sensor (DEXCOM G7 SENSOR) MISC Change every 10 days., Disp-3 each, R-5, E-Prescribe      !! Continuous Blood Gluc  Sensor (DEXCOM G7 SENSOR) MISC Change every 10 days., Disp-3 each, R-5, E-Prescribe      Glucagon (GVOKE HYPOPEN) 1 MG/0.2ML pen Inject the contents of 1 device under the skin into lower abdomen, outer thigh, or outer upper arm as needed for hypoglycemia. If no response after 15 minutes, additional 1 mg dose from a new device may be injected while waiting for emergency assistance. , Disp-0.4 mL, R-1, E-PrescribeReplaces [id number] per insurance formulary      insulin aspart (NOVOLOG PEN) 100 UNIT/ML pen Inject 1-10 Units Subcutaneous 3 times daily (before meals) Humalog 4 units with meals plus correction scale 1:50 >140 TID AC and 1:50 >200 HS Pre-meal Humalog correction scale: Do Not give Correction Insulin if Pre-Meal BG less than 140. For Pre-Meal BG  140 - 189 give 1 unit. For Pre-Meal  - 239 give 2 units. For Pre-Meal  - 289 give 3 units. For Pre-Meal  - 339 give 4 units. For Pre-Meal - 399 give 5 units. For Pre-Meal -449 give 6 units For Pre-Meal BG greater than or eq ual to 450 give 7 units. To be given with prandial insulin, and based on pre-meal blood glucose. Bedtime Humalog correction scale: Do Not give Bedtime Correction Insulin if BG less than 200. For  - 249 give 1 units. For  - 299 give 2 units. F or  - 349 give 3 units. For  -399 give 4 units. For BG greater than or equal to 400 give 5 units. Notify provider if glucose greater than or equal to 350 mg/dL after administration of correction dose., Disp-15 mL, R-1, E-Prescribe      !! insulin pen needle (32G X 4 MM) 32G X 4 MM miscellaneous Use as directed by provider Per insurance coverageDisp-200 each, [id number]      !! insulin pen needle (BD PEN NEEDLE SHERRIE 2ND GEN) 32G X 4 MM miscellaneous USES 5 PER DAY, Disp-100 each, R-11, E-Prescribe      melatonin 10 MG TABS tablet Take 1 tablet (10 mg) by mouth nightly as needed for sleep, OTC      multivitamin w/minerals (THERA-VIT-M) tablet TAKE 1 TABLET  BY MOUTH DAILY, Disp-90 tablet, R-1, E-Prescribe      mycophenolate (GENERIC EQUIVALENT) 250 MG capsule Take 3 capsules (750 mg) by mouth 2 times daily, Disp-180 capsule, R-11, E-Prescribe      omeprazole (PRILOSEC) 20 MG DR capsule Take 1 capsule (20 mg) by mouth 2 times daily, Disp-180 capsule, R-1, Historical      polyethylene glycol (MIRALAX) 17 GM/Dose powder Take 17 g by mouth daily, Disp-510 g, R-1, E-Prescribe      predniSONE (DELTASONE) 5 MG tablet Take 1 tablet (5 mg) by mouth daily, Disp-90 tablet, R-3, E-PrescribeTXP DT 3/5/2024 (Liver) TXP Dischg DT 3/21/2024 DX Liver replaced by transplant Z94.4 TX [facility] ([place], [place])      senna-docusate (SENOKOT-S/PERICOLACE) 8.6-50 MG tablet 1-2 tablets by Oral or Feeding Tube route 2 times daily as needed for constipation, Disp-60 tablet, R-1, E-Prescribe      sulfamethoxazole-trimethoprim (BACTRIM) 400-80 MG tablet Take 1 tablet by mouth every evening, Disp-30 tablet, R-5, E-Prescribe      tacrolimus (GENERIC EQUIVALENT) 1 MG capsule Take 5 capsules (5 mg) by mouth 2 times daily, Disp-450 capsule, R-3, E-Prescribe      thin (NO BRAND SPECIFIED) lancets Use with lanceting device. To accompany: Blood Glucose Monitor Brands: per insurance., Disp-100 each, R-6, E-Prescribe      ursodiol (ACTIGALL) 300 MG capsule Take 1 capsule (300 mg) by mouth 2 times daily, Disp-60 capsule, R-5, E-Prescribe      valGANciclovir (VALCYTE) 450 MG tablet Take 1 tablet (450 mg) by mouth every evening, Disp-30 tablet, R-2, E-Prescribe       !! - Potential duplicate medications found. Please discuss with provider.        STOP taking these medications       ketoconazole (NIZORAL) 200 MG tablet Comments:   Reason for Stopping:         magnesium glycinate 100 MG CAPS capsule Comments:   Reason for Stopping:         ondansetron (ZOFRAN) 4 MG tablet Comments:   Reason for Stopping:         QUEtiapine (SEROQUEL) 25 MG tablet Comments:   Reason  for Stopping:                  Resume Home Care Services    Accepting Home [facility]  Se[facility] Rod[facility]   [phone]   Fax [phone]  RN, PT, OT     Reason for your hospital stay    Patient was admitted with RUQ pain and ANGEL and underwent fluid aspiration in IR on 4/1. Gram stain was negative. Tacrolimus levels were supratherapeutic, and acute kidney injury was improving with hydration and reduction in tacrolimus dosing.     Activity    Your activity upon discharge: Walk at least four times a day, lift no greater than 10 pounds for 6 weeks from the time of surgery.  After 6 weeks time please return to your normal exercise routine. No driving while taking narcotics or until 3 weeks after surgery.     Adult [facility] Follow-up and recommended labs and tests    Transplant labs to be drawn on Weds, 4/3/2024: CBC, BMP, hepatic panel, mag, phos, and tacrolimus level.  Can decide about 4/4/24 labs based on 4/3 results, then resume every Monday, Thursday labs in the future.    Resume all other follow up as previously arranged.    Appointments on [place] and/or [facility] (with [facility] or [facility] provider or service). Call [phone] if you haven't heard regarding these appointments within 7 days of discharge.     Monitor and record    blood glucose 4 times a day, before meals and at bedtime     When to contact your care team    Call your transplant coordinator at [phone] if you have any of the following: sustained temperature greater than 100.4 or isolated fever spike to 101.5, increased pain over graft or difficulty obtaining or taking your transplant medications.    If it is outside of office hours, please call the hospital switchboard at [phone] and ask to have the transplant surgery fellow paged for urgent medical questions.     Diet    Follow this diet upon discharge: Low potassium diet         Data   Most Recent 2 LFT's:  Recent Labs   Lab Test 04/02/24  0546 04/01/24  0534   AST 23 17   ALT 9 8   ALKPHOS  131 136   BILITOTAL 0.8 0.6     Attestation:    The patient has been seen with team and evaluated by me. <30 min planning discharge  Vital signs, labs, medications and orders were reviewed.   When obtained, diagnostic images were reviewed by me and interpreted as above.    The care plan was discussed with the multidisciplinary team and I agree with the findings and plan in this note, with any differences recorded in blue.    Immunosuppressive medication management was reviewed and adjusted as reflected in the note and orders.       Chris Garcia MD  Professor of Surgery

## 2024-04-02 NOTE — CONSULTS
Care Management Initial Consult    General Information  Assessment completed with: Patient, Care Team Member, Spouse or significant other, -chart review,    Type of CM/SW Visit: Initial Assessment    Primary Care Provider verified and updated as needed:     Readmission within the last 30 days: other (see comments)   Return Category: Post-op/Post-procedure complication  Reason for Consult: discharge planning  Advance Care Planning: Advance Care Planning Reviewed: present on chart          Communication Assessment  Patient's communication style: spoken language (English or Bilingual)    Hearing Difficulty or Deaf: no   Wear Glasses or Blind: no    Cognitive  Cognitive/Neuro/Behavioral: WDL        Orientation: oriented x 4  Mood/Behavior: calm, cooperative          Living Environment:   People in home: spouse     Current living Arrangements: house      Able to return to prior arrangements: yes       Family/Social Support:  Care provided by: self, spouse/significant other, homecare agency  Provides care for: no one                Description of Support System:           Current Resources:   Patient receiving home care services:  Home care RN, PT, OT    [facility] Care  [facility]   [phone]   Fax [phone]  RN, PT, OT    Community Resources:  None  Equipment currently used at home: none  Supplies currently used at home:      Employment/Financial:  Employment Status:          Financial Concerns: none           Does the patient's insurance plan have a 3 day qualifying hospital stay waiver?  No    Lifestyle & Psychosocial Needs:  Social Determinants of Health     Food Insecurity: Low Risk  (12/22/2023)    Food Insecurity     Within the past 12 months, did you worry that your food would run out before you got money to buy more?: No     Within the past 12 months, did the food you bought just not last and you didn t have money to get more?: No   Depression: Not at risk (3/22/2024)    PHQ-2     PHQ-2 Score: 2    Housing Stability: Low Risk  (12/22/2023)    Housing Stability     Do you have housing? : Yes     Are you worried about losing your housing?: No   Tobacco Use: High Risk (3/29/2024)    Patient History     Smoking Tobacco Use: Former     Smokeless Tobacco Use: Current     Passive Exposure: Never   Financial Resource Strain: Low Risk  (12/22/2023)    Financial Resource Strain     Within the past 12 months, have you or your family members you live with been unable to get utilities (heat, electricity) when it was really needed?: No   Alcohol Use: Not on file   Transportation Needs: Low Risk  (12/22/2023)    Transportation Needs     Within the past 12 months, has lack of transportation kept you from medical appointments, getting your medicines, non-medical meetings or appointments, work, or from getting things that you need?: No   Physical Activity: Not on file   Interpersonal Safety: Low Risk  (12/22/2023)    Interpersonal Safety     Do you feel physically and emotionally safe where you currently live?: Yes     Within the past 12 months, have you been hit, slapped, kicked or otherwise physically hurt by someone?: No     Within the past 12 months, have you been humiliated or emotionally abused in other ways by your partner or ex-partner?: No   Stress: Not on file   Social Connections: Not on file       Functional Status:  Prior to admission patient needed assistance:   Dependent ADLs:: Independent  Dependent IADLs:: Transportation       Mental Health Status:  Mental Health Status: No Current Concerns       Chemical Dependency Status:  Chemical Dependency Status: No Current Concerns             Values/Beliefs:  Spiritual, Cultural Beliefs, [employer]ian Practices, Values that affect care: no               Care Management Discharge Note     Discharge Date: 04/02/2024        Discharge Disposition: Home, Home Care     Discharge Services: None     Discharge DME:       Discharge Transportation:       Private pay costs discussed:  Not applicable     Does the patient's insurance plan have a 3 day qualifying hospital stay waiver?  No     PAS Confirmation Code:    Patient/family educated on Medicare website which has current facility and service quality ratings:       Education Provided on the Discharge Plan:    Persons Notified of Discharge Plans: Patient, spouse, home care  Patient/Family in Agreement with the Plan:       Handoff Referral Completed: Yes     Additional Information:  Per care team rounds pt medically cleared for discharge today.  Provider would like transplant labs drawn tomorrow.       Met with pt and spouse.  Pt spouse arranged lab appointment for tomorrow 4/3.  Pt has been going into clinic twice a week for transplant lab draws.  [facility] for home RN, PT, OT.  Pt and spouse note no concerns regarding plan for discharge today.        Updated [facility].  Agreed to fax final discharge orders, summary as well as H & P.     Outpatient services:     Home Care  [facility]   [phone]   Fax [phone]  RN, PT, OT     Ora Perry RN BSN, PHN, ACM-RN  2/12/2024  Nurse Coordinator    Phone: [phone]     Social Work and Care Management Department      SEARCHABLE in [facility] - search CARE COORDINATOR      [place] ([phone]) Saturday & Sunday; [phone]) [facility] Recognized Holidays      Units: 5A Onc Vocera & 5C Vocera Pager: [phone]     Units: 6B Vocera & 6C Vocera  Pager: [phone]     Units: 7A SOT RNCC Vocera, 7B Med Surg Vocera, & 7C Med Surg Vocera  Pager: [phone]     Units: 6A Vocera & 4A CVICU Vocera, 4C MICU Vocera, and 4E SICU Vocera   Pager: [phone]     Units: 5 Ortho Vocera & 5 Med Surg Vocera  Pager: [phone]     Units: 6 Med Surg Vocera & 8 Med Surg Vocera  Pager [phone]     4/2/2024

## 2024-04-02 NOTE — PROGRESS NOTES
Diabetes Self-Management Education & Support    Mary Lopez presents today for education related to Type 2 diabetes    Patient is being treated with:  insulin  He is accompanied by spouse    Year of diagnosis: 2023  Referring provider:  Lai  Patient is followed by Suzanne THOMPSON  Living Situation: with spouse   Employment: n/a    PATIENT CONCERNS RELATED TO DIABETES SELF MANAGEMENT:       ASSESSMENT:    Taking Medication:     Current Diabetes Management per Patient:  Taking diabetes medications? yes:     Diabetes Medication(s)       Diabetic Other       Glucagon (GVOKE HYPOPEN) 1 MG/0.2ML pen Inject the contents of 1 device under the skin into lower abdomen, outer thigh, or outer upper arm as needed for hypoglycemia. If no response after 15 minutes, additional 1 mg dose from a new device may be injected while waiting for emergency assistance.       Insulin       insulin aspart (NOVOLOG PEN) 100 UNIT/ML pen Inject 1-10 Units Subcutaneous 3 times daily (before meals) Humalog 4 units with meals plus correction scale 1:50 >140 TID AC and 1:50 >200 HS Pre-meal Humalog correction scale: Do Not give Correction Insulin if Pre-Meal BG less than 140. For Pre-Meal  - 189 give 1 unit. For Pre-Meal  - 239 give 2 units. For Pre-Meal  - 289 give 3 units. For Pre-Meal  - 339 give 4 units. For Pre-Meal - 399 give 5 units. For Pre-Meal -449 give 6 units For Pre-Meal BG greater than or equal to 450 give 7 units. To be given with prandial insulin, and based on pre-meal blood glucose. Bedtime Humalog correction scale: Do Not give Bedtime Correction Insulin if BG less than 200. For  - 249 give 1 units. For  - 299 give 2 units. For  - 349 give 3 units. For  -399 give 4 units. For BG greater than or equal to 400 give 5 units. Notify provider if glucose greater than or equal to 350 mg/dL after administration of correction dose.     Insulin Glargine-yfgn (SEMGLEE,  YFGN,) 100 UNIT/ML SOLN Inject 23 Units Subcutaneous daily            Monitoring              Taking semglee 23 units  Humalog 4 units with meals   Correction 1/50/140/200    Patient's most recent   Lab Results   Component Value Date    A1C 8.1 10/31/2023        Healthy Eating:   encouraged consistent carb diet    Problem Solving:      Patient is at risk of hypoglycemia?: YES  Hospitalizations for hyper or hypoglycemia: No      EDUCATION and INSTRUCTION PROVIDED AT THIS VISIT:    Reviewed dexcom reports.    Patient-stated goal written and given to Mary Lopez.  Verbalized and demonstrated understanding of instructions.     PLAN:  Patient is taking Semglee 23 units    Correction was given last night leading to a low. Discussed not giving bedtime correction for now as also noted by Geovanna THOMPSON    Adjust Humalog 4 units with breakfast, 5 units with lunch and 3 units with dinner.     FOLLOW-UP:    Via video in two weeks    Time spent with patient at today's visit was 40 minutes.      Start 3:14 pm  End 3:54 pm    Any diabetes medication dose changes were made via the CDE Protocol and Collaborative Practice Agreement with Zaid and  Vishal.  A copy of this encounter was provided to patient's referring provider.

## 2024-04-02 NOTE — PROGRESS NOTES
[facility]  Transplant Nephrology progress note  Date of Admission:  3/30/2024  Today's Date: 04/02/2024  Requesting physician: No att. providers found    Recommendations:  -Ensure optimal bowel regimen for daily bowel movements  -Magnesium repletion per primary team  - follow-up drained fluid culture.  - Recommend strict blood glucose control.   -ok to resume bactrim     Assessment & Plan   # ANGEL: Trend down in creatinine.  Fair urine output.  No acute indications for dialysis.   - ANGEL felt secondary to high tacrolimus level, IV contrast and and dehydration.    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  Transaminases stable. Followed by transplant surgery     # Immunosuppression: Tacrolimus immediate release (goal 8-10) and Mycophenolate mofetil (dose 750 mg every 12 hours)   - Patient is in an immunosuppressed state and will continue to monitor for efficacy and toxicity of immunosuppression medications.   - Changes: Not at this time    # Infection Prophylaxis:   - PJP: Sulfa/TMP (Bactrim), ok to resume bactrim   - CMV: Valganciclovir (Valcyte)    # Blood Pressure: Controlled;  Goal BP: > 100, but < 130 systolic   - Volume status: Euvolemic   - Changes: No UA showed 16 hyaline casts which shows dehydration    # Type 2 Diabetes: Controlled (HbA1c <7%)           Last HbA1c: 4.8%              - Management as per primary team. Recommend tight Blood sugar control    # Anemia in Chronic Renal Disease: Hgb: Stable      MAITE: No   - Iron studies: Not checked recently    # Mineral Bone Disorder:    - Secondary renal hyperparathyroidism; PTH level: Not checked recently        On treatment: None  - Vitamin D; level: Not checked recently        On supplement: No  - Calcium; level: Normal        On supplement: No  - Phosphorus; level:  Normal        On supplement: No    # Electrolytes:   - Potassium; level: High        On supplement: No  - Magnesium; level: Low        On supplement: Yes. Should be restarted   - Bicarbonate; level: Low        On supplement:  -PO bicarb 1300 mg BID .   - Sodium; level: Normal    # Hematoma: noted previously on US and see on CT scan. Plan to have IR place drain.   CT scan 3/30/24: 1.  Complex, rim-enhancing right subhepatic fluid collection. Possibilities include postsurgical hematoma or biloma and given the peripheral enhancement superimposed infection suspected.  2.  Small amount of pelvic free fluid.  3.  Distal appendix is mildly prominent but there is no significant adjacent inflammation.  4.  Under distended bladder. Wall thickening/cystitis not excluded.  - follow-up drained fluid culture.    # Transplant History:  Etiology of Kidney Failure: ANGEL  Tx: Liver Tx  Transplant: 3/5/2024 (Liver)    Significant changes in immunosuppression: None  Significant transplant-related complications: None    Recommendations were communicated to the primary team verbally.    Seen and discussed with Dr. Laura Kirkpatrick MD    Attestation:  This patient has been seen and evaluated by me, Flaco Sanchez MD.  I have reviewed the note and agree with plan of care as documented by the fellow.       REASON FOR CONSULT   ANGEL    Interval events  Vitally stable   Good urine output, 1500 yesterday and 1 liter since midnight   Cr down to 1.2  Resumed bactrim  Ready and stable for discharge             Review of Systems    The 10 point Review of Systems is negative other than noted in the HPI or here.     Past Medical History    I have reviewed this patient's medical history and updated it with pertinent information if needed.   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  Past Surgical History:   Procedure Laterality Date    BENCH LIVER  3/5/2024    Procedure: Bench liver;  Surgeon: Ryder Cortez MD;  Location: [facility] OR    CHOLECYSTECTOMY      COLONOSCOPY N/A 2024    Procedure: COLONOSCOPY, WITH POLYPECTOMY;  Surgeon: Jak Urbina MD;  Location: [facility]    CV RIGHT HEART CATH MEASUREMENTS RECORDED N/A 2024    Procedure: Right Heart Catheterization;  Surgeon: Alfred Tafoya MD;  Location:  [facility] CARD[facility] CATH LAB    IR LIVER BIOPSY PERCUTANEOUS  3/15/2024    TONSILLECTOMY      TRANSPLANT LIVER RECIPIENT  DONOR N/A 3/5/2024    Procedure: Transplant liver recipient  donor, bile duct stent placement;  Surgeon: Ryder Cortez MD;  Location: [facility] OR    VASECTOMY         Family History   I have reviewed this patient's family history and updated it with pertinent information if needed.   Family History   Problem Relation Age of Onset    Anxiety Disorder Mother     Depression Mother     Bipolar Disorder Mother     Chronic Obstructive Pulmonary Disease Mother      Lung Cancer Mother 81    Morbid Obesity Father     Diabetes Father     Diabetes Type 2  Brother     Substance Abuse Maternal Grandfather     Substance Abuse Paternal Grandfather     Colon Cancer No family hx of     Liver Disease No family hx of        Social History   I have reviewed this patient's social history and updated it with pertinent information if needed. Mary Lopez  reports that he has quit smoking. His smoking use included cigarettes. He has never been exposed to tobacco smoke. His smokeless tobacco use includes chew. He reports that he does not currently use alcohol after a past usage of about 12.0 standard drinks of alcohol per week. He reports that he does not currently use drugs.    Allergies   Allergies   Allergen Reactions    Other Food Allergy Anaphylaxis     Legumes (black beans, baked beans, chickpeas)    Pineapple Itching     Prior to Admission Medications   Current Facility-Administered Medications   Medication Dose Route Frequency Provider Last Rate Last Admin    aspirin (ASA) tablet 325 mg  325 mg Oral or Feeding Tube Daily Lesley Wise PA-C   325 mg at 04/02/24 0746    insulin aspart (NovoLOG) injection (RAPID ACTING)  1-7 Units Subcutaneous TID AC Franchesca Aguilera PA-C        insulin aspart (NovoLOG) injection (RAPID ACTING)  1-5 Units Subcutaneous At Bedtime Franchesca Aguilera PA-C        insulin glargine (LANTUS PEN) injection 5 Units  5 Units Subcutaneous Q24H Franchesca Aguilera PA-C        magnesium oxide (MAG-OX) tablet 800 mg  800 mg Oral BID Lesley Wise PA-C   800 mg at 04/02/24 1346    multivitamin w/minerals (THERA-VIT-M) tablet 1 tablet  1 tablet Oral Daily Lesley Wise PA-C   1 tablet at 04/02/24 0746    mycophenolate (GENERIC EQUIVALENT) capsule 750 mg  750 mg Oral BID IS Lesley Wise PA-C   750 mg at 04/02/24 0746    pantoprazole (PROTONIX) EC tablet 40 mg  40 mg Oral BID Lesley Wise PA-C   40 mg at  "24 0746    piperacillin-tazobactam (ZOSYN) 3.375 g vial to attach to  mL bag  3.375 g Intravenous Q6H Lesley Wise PA-C   3.375 g at 24 0956    polyethylene glycol (MIRALAX) Packet 17 g  17 g Oral Daily Lesley Wise PA-C   17 g at 24 0754    predniSONE (DELTASONE) tablet 5 mg  5 mg Oral Daily Lesley Wise PA-C   5 mg at 24 0747    senna-docusate (SENOKOT-S/PERICOLACE) 8.6-50 MG per tablet 2 tablet  2 tablet Oral or Feeding Tube BID Lesley Wise PA-C   2 tablet at 24 0746    sodium bicarbonate tablet 1,300 mg  1,300 mg Oral BID Lesley Wise PA-C   1,300 mg at 24 0746    [Held by provider] sulfamethoxazole-trimethoprim (BACTRIM) 400-80 MG per tablet 1 tablet  1 tablet Oral QPM Lesley Wise PA-C        tacrolimus (GENERIC EQUIVALENT) capsule 5 mg  5 mg Oral BID IS Lesley Wise PA-C   5 mg at 24 0746    valGANciclovir (VALCYTE) tablet 450 mg  450 mg Oral QPM Lesley Wise PA-C   450 mg at 24     Current Facility-Administered Medications   Medication Dose Route Frequency Provider Last Rate Last Admin    dextrose 10% infusion   Intravenous Continuous PRN Lesley Wise PA-C        sodium chloride 0.9 % infusion   Intravenous Continuous Lesley Wise PA-C 100 mL/hr at 24 0800 Rate Verify at 24 0800       Physical Exam   Temp  Av.8  F (36.6  C)  Min: 97.5  F (36.4  C)  Max: 98  F (36.7  C)      Pulse  Av  Min: 69  Max: 85 Resp  Av.8  Min: 16  Max: 19  SpO2  Av.9 %  Min: 99 %  Max: 100 %     /69 (BP Location: Right arm)   Pulse 84   Temp 98.4  F (36.9  C) (Oral)   Resp 17   Ht 1.727 m (5' 8\")   Wt 85.7 kg (188 lb 14.4 oz)   SpO2 98%   BMI 28.72 kg/m     Date 24 07 - 24 0659   Shift 5013-3034 6989-2701 2922-0486 24 Hour Total   INTAKE   I.V. 50   50   IV Piggyback 1000   1000   Shift Total(mL/kg) 1050(12.25)   1050(12.25)   OUTPUT "   Shift Total(mL/kg)       Weight (kg) 85.68 85.68 85.68 85.68      Admit Weight: 85.7 kg (188 lb 14.4 oz)     GENERAL APPEARANCE: alert and no distress  HENT: mouth without ulcers or lesions  RESP: lungs clear to auscultation - no rales, rhonchi or wheezes  CV: regular rhythm, normal rate, no rub, no murmur  EDEMA: no LE edema bilaterally  ABDOMEN: well-healed incisions RLQ tenderness  MS: extremities normal - no gross deformities noted, no evidence of inflammation in joints, no muscle tenderness  SKIN: no rash    Data   CMP  Recent Labs   Lab 04/02/24  1058 04/02/24  1019 04/02/24  0959 04/02/24  0935 04/02/24  0900 04/02/24  0603 04/02/24  0546 04/02/24  0202 04/02/24  0149 04/01/24  2306 04/01/24  2146 04/01/24  0605 04/01/24  0534 03/31/24  2110 03/31/24  2045 03/31/24  0603 03/31/24  0602 03/31/24  0026 03/30/24  2258 03/28/24  0801   NA  --   --   --   --   --   --  140  --   --   --   --   --  134*  --  132*  --  131*  --  132* 135   POTASSIUM  --   --   --  4.6  --   --  4.3  4.3  --  4.5  --  5.0   < > 5.3  5.3   < > 6.3*  6.3*   < > 6.6*  6.6*   < > 6.6* 5.6*   CHLORIDE  --   --   --   --   --   --  109*  --   --   --   --   --  105  --  103  --  104  --  104 104   CO2  --   --   --   --   --   --  23  --   --   --   --   --  21*  --  22  --  18*  --  19* 24   ANIONGAP  --   --   --   --   --   --  8  --   --   --   --   --  8  --  7  --  9  --  9 7   * 186* 168*  --  91   < > 107*   < >  --    < >  --    < > 321*   < > 218*   < > 205*   < > 203* 164*   BUN  --   --   --   --   --   --  16.0  --   --   --   --   --  30.0*  --  31.9*  --  36.1*  --  34.3* 15.3   CR  --   --   --   --   --   --  1.28*  --   --   --   --   --  1.83*  --  1.98*  --  2.19*  --  2.00* 1.20*   GFRESTIMATED  --   --   --   --   --   --  67  --   --   --   --   --  44*  --  40*  --  35*  --  39* 73   MEG  --   --   --   --   --   --  8.8  --   --   --   --   --  8.2*  --  8.3*  --  8.9  --  9.5 9.9   MAG  --   --   --    --   --   --  1.6*  --   --   --   --   --  1.3*  --  1.4*  --   --   --   --  1.3*   PHOS  --   --   --   --   --   --  3.1  --   --   --   --   --  4.5  --  4.5  --   --   --   --  4.5   PROTTOTAL  --   --   --   --   --   --  5.2*  --   --   --   --   --  4.9*  --   --   --  5.4*  --  6.7 6.5   ALBUMIN  --   --   --   --   --   --  2.6*  --   --   --   --   --  2.5*  --   --   --  2.8*  --  3.5 3.5   BILITOTAL  --   --   --   --   --   --  0.8  --   --   --   --   --  0.6  --   --   --  0.8  --  1.0 1.2   ALKPHOS  --   --   --   --   --   --  131  --   --   --   --   --  136  --   --   --  155*  --  191* 203*   AST  --   --   --   --   --   --  23  --   --   --   --   --  17  --   --   --  17  --  19 27   ALT  --   --   --   --   --   --  9  --   --   --   --   --  8  --   --   --  13  --  23 35    < > = values in this interval not displayed.     CBC  Recent Labs   Lab 04/02/24  0546 04/01/24  0534 03/31/24  0657 03/30/24  8710   HGB 9.9* 8.9* 10.4* 11.3*   WBC 5.3 5.8 6.2 8.3   RBC 3.12* 2.80* 3.26* 3.65*   HCT 30.2* 27.5* 32.4* 35.4*   MCV 97 98 99 97   MCH 31.7 31.8 31.9 31.0   MCHC 32.8 32.4 32.1 31.9   RDW 16.5* 17.0* 17.0* 16.7*    157 207 283     INR  Recent Labs   Lab 03/31/24  1339   INR 1.13   PTT 33     ABGNo lab results found in last 7 days.   Urine Studies  Recent Labs   Lab Test 03/30/24  2248 03/04/24  1042 02/23/24  1558 02/15/24  2015 11/09/23  1147   COLOR Yellow Yellow Yellow Dark Yellow* Yellow   APPEARANCE Clear Clear Clear Clear Clear   URINEGLC Negative 500* Negative Negative >=1000*   URINEBILI Negative Negative Small* Small* Small*   URINEKETONE Negative Negative Negative Negative Negative   SG 1.021 1.007 1.009 1.014 <=1.005   UBLD Negative Negative Negative Negative Negative   URINEPH 5.0 5.0 5.0 5.0 6.0   PROTEIN Negative Negative Negative Negative Negative   UROBILINOGEN  --   --   --   --  2.0*   NITRITE Negative Negative Negative Negative Negative   LEUKEST Negative Negative  Negative Negative Negative   RBCU 0 <1 <1 <1 0-2   WBCU 1 <1 <1 <1 0-5     No lab results found.  PTH  No lab results found.  Iron Studies  Recent Labs   Lab Test 12/19/23  0758 08/23/23  1018 08/09/23  1122 07/15/23  1048   IRON 135 120 118 92   KE 2,948* 4,261* 4,611*  --        IMAGING:  All imaging studies reviewed by me.

## 2024-04-02 NOTE — PLAN OF CARE
Goal Outcome Evaluation:      Plan of Care Reviewed With: patient, spouse    Overall Patient Progress: improvingOverall Patient Progress: improving    Outcome Evaluation: Anticipate discharge to home with resumption of previous home RN, PT, OT services.

## 2024-04-02 NOTE — PLAN OF CARE
"Goal Outcome Evaluation:      Plan of Care Reviewed With: patient    Overall Patient Progress: no change  /77 (BP Location: Right arm)   Pulse 88   Temp 98.2  F (36.8  C) (Oral)   Resp 18   Ht 1.727 m (5' 8\")   Wt 85.7 kg (188 lb 14.4 oz)   SpO2 96%   BMI 28.72 kg/m      Shift: 2374-4852  Isolation Status: contact  VS: stable on RA, afebrile  Neuro: Aox4  Behaviors: calm and cooperative  BG: Q1H, BG ranging from , on insulin gtt, algorithm 1  Labs/Imaging: pending AM lab results  Respiratory: WDL  Cardiac: WDL  Pain/Nausea: mild pain, pt declined pain meds, denies nausea  PRN: none given  Diet: 3 gm K diet  IV Access: L PIV x2  Infusion(s): NS @ 100mL/hr, insulin gtt  Lines/Drains: n/a  GI/: urinal at bedside, voids spontaneously without difficulty  Skin: clamshell abdominal incision  Mobility: UAL  Plan: Continue with plan of care, will notify MD with any changes          "

## 2024-04-02 NOTE — PROGRESS NOTES
Care Management Discharge Note    Discharge Date: 04/02/2024       Discharge Disposition: Home, Home Care    Discharge Services: None    Discharge DME:      Discharge Transportation:      Private pay costs discussed: Not applicable    Does the patient's insurance plan have a 3 day qualifying hospital stay waiver?  No    PAS Confirmation Code:    Patient/family educated on Medicare website which has current facility and service quality ratings:      Education Provided on the Discharge Plan:    Persons Notified of Discharge Plans: Patient, spouse, home care  Patient/Family in Agreement with the Plan:      Handoff Referral Completed: Yes    Additional Information:  Per care team rounds pt medically cleared for discharge today.  Provider would like transplant labs drawn tomorrow.      Met with pt and spouse.  Pt spouse arranged lab appointment for tomorrow 4/3.  Pt has been going into clinic twice a week for transplant lab draws.  [facility] RN, PT, OT.  Pt and spouse note no concerns regarding plan for discharge today.       Updated [facility].  Agreed to fax final discharge orders, summary as well as H & P.    Outpatient services:    [facility]   [phone]   Fax [phone]  RN, PT, OT    Ora Perry RN BSN, PHN, ACM-RN  2/12/2024  Nurse Coordinator    Phone: [phone]    Social Work and Care Management Department     SEARCHABLE in [facility] - search CARE COORDINATOR     [facility] ([phone]) Saturday & Sunday; ([phone]) [facility] Recognized Holidays     Units: 5A Onc Vocera & 5C Vocera Pager: [phone]    Units: 6B Vocera & 6C Vocera  Pager: [phone]    Units: 7A SOT RNCC Vocera, 7B Med Surg Vocera, & 7C Med Surg Vocera  Pager: [phone]    Units: 6A Vocera & 4A CVICU Vocera, 4C MICU Vocera, and 4E SICU Vocera   Pager: [phone]    Units: 5 Ortho Vocera & 5 Med Surg Vocera  Pager: [phone]    Units: 6 Med Surg  Hilda & 8 Med Surg Vocera  Pager [phone]    4/2/2024

## 2024-04-02 NOTE — PLAN OF CARE
"/71 (BP Location: Right arm)   Pulse 79   Temp 98.4  F (36.9  C) (Oral)   Resp 10   Ht 1.727 m (5' 8\")   Wt 85.7 kg (188 lb 14.4 oz)   SpO2 99%   BMI 28.72 kg/m      Shift: 4940-5918  Isolation Status: COntact  VS: VSS on RA, afebrile  Neuro: Aox4  Behaviors: Calm, Pleasant, sleeping in between cares  BG: Q1h, now Q2H. 123-183  Labs: Mg 1.3. Replaced with IV  Respiratory: Diminished in bases  Cardiac: WDL  Pain/Nausea: Denies nausea. C/o abd pain  PRN: Oxy x2, Atarax x1  Diet: 3g K  IV Access: L PIV x2  Infusion(s): Insulin gtt. Alg 1. MIV NS @ 100ml/hr  Lines/Drains: none  GI/: voiding in urinal. No BM this shift. Passing gas  Skin: Clamshell incision healing with steri strips  Mobility: UAL  Events/Education: IR procedure, no drain placement.  Plan: Continue with POC and update providers of changes.   "

## 2024-04-03 ENCOUNTER — TELEPHONE (OUTPATIENT)
Dept: FAMILY MEDICINE | Facility: CLINIC | Age: 54
End: 2024-04-03

## 2024-04-03 ENCOUNTER — TELEPHONE (OUTPATIENT)
Dept: ENDOCRINOLOGY | Facility: CLINIC | Age: 54
End: 2024-04-03

## 2024-04-03 ENCOUNTER — TELEPHONE (OUTPATIENT)
Dept: TRANSPLANT | Facility: CLINIC | Age: 54
End: 2024-04-03

## 2024-04-03 ENCOUNTER — LAB (OUTPATIENT)
Dept: LAB | Facility: CLINIC | Age: 54
End: 2024-04-03
Payer: COMMERCIAL

## 2024-04-03 DIAGNOSIS — Z94.4 LIVER REPLACED BY TRANSPLANT (H): Chronic | ICD-10-CM

## 2024-04-03 DIAGNOSIS — Z94.4 LIVER REPLACED BY TRANSPLANT (H): ICD-10-CM

## 2024-04-03 LAB
ALBUMIN SERPL BCG-MCNC: 2.9 G/DL (ref 3.5–5.2)
ALP SERPL-CCNC: 135 U/L (ref 40–150)
ALT SERPL W P-5'-P-CCNC: 10 U/L (ref 0–70)
ANION GAP SERPL CALCULATED.3IONS-SCNC: 8 MMOL/L (ref 7–15)
AST SERPL W P-5'-P-CCNC: 23 U/L (ref 0–45)
BACTERIA ABSC ANAEROBE+AEROBE CULT: NO GROWTH
BILIRUB DIRECT SERPL-MCNC: 0.49 MG/DL (ref 0–0.3)
BILIRUB SERPL-MCNC: 0.8 MG/DL
BUN SERPL-MCNC: 11.8 MG/DL (ref 6–20)
CALCIUM SERPL-MCNC: 9.3 MG/DL (ref 8.6–10)
CHLORIDE SERPL-SCNC: 105 MMOL/L (ref 98–107)
CREAT SERPL-MCNC: 1.11 MG/DL (ref 0.67–1.17)
DEPRECATED HCO3 PLAS-SCNC: 25 MMOL/L (ref 22–29)
EGFRCR SERPLBLD CKD-EPI 2021: 80 ML/MIN/1.73M2
ERYTHROCYTE [DISTWIDTH] IN BLOOD BY AUTOMATED COUNT: 15.9 % (ref 10–15)
GLUCOSE SERPL-MCNC: 200 MG/DL (ref 70–99)
GRAM STAIN RESULT: NORMAL
GRAM STAIN RESULT: NORMAL
HCT VFR BLD AUTO: 31.7 % (ref 40–53)
HGB BLD-MCNC: 10.4 G/DL (ref 13.3–17.7)
MAGNESIUM SERPL-MCNC: 1.4 MG/DL (ref 1.7–2.3)
MCH RBC QN AUTO: 31.7 PG (ref 26.5–33)
MCHC RBC AUTO-ENTMCNC: 32.8 G/DL (ref 31.5–36.5)
MCV RBC AUTO: 97 FL (ref 78–100)
PHOSPHATE SERPL-MCNC: 2.9 MG/DL (ref 2.5–4.5)
PLATELET # BLD AUTO: 209 10E3/UL (ref 150–450)
POTASSIUM SERPL-SCNC: 4.7 MMOL/L (ref 3.4–5.3)
PROT SERPL-MCNC: 5.6 G/DL (ref 6.4–8.3)
RBC # BLD AUTO: 3.28 10E6/UL (ref 4.4–5.9)
SODIUM SERPL-SCNC: 138 MMOL/L (ref 135–145)
TACROLIMUS BLD-MCNC: 7.1 UG/L (ref 5–15)
TME LAST DOSE: NORMAL H
TME LAST DOSE: NORMAL H
WBC # BLD AUTO: 4.7 10E3/UL (ref 4–11)

## 2024-04-03 PROCEDURE — 84100 ASSAY OF PHOSPHORUS: CPT

## 2024-04-03 PROCEDURE — 80053 COMPREHEN METABOLIC PANEL: CPT

## 2024-04-03 PROCEDURE — 85027 COMPLETE CBC AUTOMATED: CPT

## 2024-04-03 PROCEDURE — 82248 BILIRUBIN DIRECT: CPT

## 2024-04-03 PROCEDURE — 36415 COLL VENOUS BLD VENIPUNCTURE: CPT

## 2024-04-03 PROCEDURE — 83735 ASSAY OF MAGNESIUM: CPT

## 2024-04-03 PROCEDURE — 80197 ASSAY OF TACROLIMUS: CPT

## 2024-04-03 RX ORDER — TACROLIMUS 5 MG/1
5 CAPSULE ORAL EVERY 12 HOURS
Qty: 180 CAPSULE | Refills: 3 | Status: SHIPPED | OUTPATIENT
Start: 2024-04-03 | End: 2024-04-16

## 2024-04-03 RX ORDER — TACROLIMUS 1 MG/1
1 CAPSULE ORAL EVERY 12 HOURS
Qty: 180 CAPSULE | Refills: 3 | Status: SHIPPED | OUTPATIENT
Start: 2024-04-03 | End: 2024-04-16

## 2024-04-03 NOTE — TELEPHONE ENCOUNTER
ISSUE:   Tacrolimus IR level 7.1 on 4/2, goal 10, dose 5 mg BID.    PLAN:   Call Patientand confirm this was an accurate 12-hour trough.   Verify Tacrolimus IR dose .  Confirm no new medications or illness.   Confirm no missed doses.   If accurate trough and accurate dose, increase Tacrolimus IR dose to 6 mg BID     Is this more than a 50% increase or decrease in current IS dose: No  Repeat labs on Monday.  Can skip tomorrow's labs.   Lore Mckeon RN     OUTCOME:   Spoke with Rosio, they confirm accurate trough level and current dose 5 mg BID.   Rosoi confirmed dose change to 6 mg BID.  [facility] agreed to repeat labs on Monday. .   Orders sent to preferred pharmacy for dose change and lab for repeat labs.   [facility] voiced understanding of plan.      Valentina Enamorado LPN

## 2024-04-03 NOTE — TELEPHONE ENCOUNTER
Forms/Letter Request    Type of form/letter: Nursing Home/Assisted Living Orders      Do we have the form/letter: Yes: [facility], Order Number: [id number]    Who is the form from? Home care    Where did/will the form come from? form was faxed in    When is form/letter needed by: 5-7 days    How would you like the form/letter returned: Fax : [phone]

## 2024-04-03 NOTE — TELEPHONE ENCOUNTER
Patient confirmed scheduled appointment:  Date: 4/26   Time: 2:30 pm   Visit type: return diabetes   Provider: Jhon   [place] [facility] [place]imaging: NA   Additional notes: Spoke to pt spouse and scheduled first avail return DAVID per below message to meet timeline of 1 month follow-up visit.    Schedule first available with PA and ok to use DAVID if available     Please note that the above appointment(s) will require manual scheduling as they are marked as DAVID and will not appear using auto search. Do not schedule the patient if another patient has already been scheduled in the requested appointment slot.     Quynh Vega on 4/3/2024 at 9:19 AM

## 2024-04-03 NOTE — TELEPHONE ENCOUNTER
Forms/Letter Request    Type of form/letter: Home Health Certification  Do we have the form/letter: Yes: Will place in providers bin    Who is the form from? Home care - [facility] [id number]    Where did/will the form come from? form was faxed in    When is form/letter needed by: asap    How would you like the form/letter returned: Fax : [phone]

## 2024-04-03 NOTE — TELEPHONE ENCOUNTER
Mary was readmitted to hosp after being seen in ER for right sided pain.  Was found to have non-infected 16 cm fluid collection, had FNA by IR, given iv antibiotics and discharged on 4/2.    Call to Adina / Mary to check in for discharge follow-up.    Appetite: moderate - not hungry but is eating.  Encouraged hydration  Diarrhea: none  Nausea: none.  Mag ox makes him nauseous  Activity:   moving around today, it's painful.  Pain management: Takes oxycodone at night, takes hydroxyzine during the day.  I also suggested tylenol.  Anxiety / depression: none  Physician follow-up:  Will see Dr. Cortez on Monday, Adina has several other appt's set up including:    Lab frequency: had labs this morning.  Will see what tac level is.  No reason for labs tomorrow.  May need tac level on Friday, I told her I will let her know. Typically labs will be done on Monday and Thursday.   Med changes:  Is using mag glycinate instead of mag ox, told her this is fine

## 2024-04-04 ENCOUNTER — DOCUMENTATION ONLY (OUTPATIENT)
Dept: TRANSPLANT | Facility: CLINIC | Age: 54
End: 2024-04-04

## 2024-04-04 ENCOUNTER — MEDICAL CORRESPONDENCE (OUTPATIENT)
Dept: HEALTH INFORMATION MANAGEMENT | Facility: CLINIC | Age: 54
End: 2024-04-04

## 2024-04-04 LAB
ENTEROCOCCUS FAECALIS: NOT DETECTED
ENTEROCOCCUS FAECIUM: NOT DETECTED
LISTERIA SPECIES (DETECTED/NOT DETECTED): NOT DETECTED
STAPHYLOCOCCUS AUREUS: NOT DETECTED
STAPHYLOCOCCUS EPIDERMIDIS: NOT DETECTED
STAPHYLOCOCCUS LUGDUNENSIS: NOT DETECTED
STAPHYLOCOCCUS SPECIES: NOT DETECTED
STREPTOCOCCUS AGALACTIAE: NOT DETECTED
STREPTOCOCCUS ANGINOSUS GROUP: NOT DETECTED
STREPTOCOCCUS PNEUMONIAE: NOT DETECTED
STREPTOCOCCUS PYOGENES: NOT DETECTED
STREPTOCOCCUS SPECIES: NOT DETECTED

## 2024-04-04 NOTE — PROVIDER NOTIFICATION
On 4/4/24 at 0400 I was notified of a critical lab result.  Pt's blood culture from 3/31 @ 0104 came back positive for gram+ bacilli resembling diphtheroids.  Lab states the will now rapid ID test and will call with updated result.  ED provider notified at this time,

## 2024-04-04 NOTE — PROGRESS NOTES
Message to Dr. Vazquez re: recent culture report:     Contains critical data Blood Culture Peripheral Blood  Order: [id number]  Status: Preliminary result       Visible to patient: No (not released)    Specimen Information: Peripheral Blood   0 Result Notes  Culture Positive on the 5th day of incubation Abnormal       Gram positive bacilli, resembling diphtheroids Panic    1 of 2 bottles        Resulting Agency: [facility]

## 2024-04-04 NOTE — PROGRESS NOTES
REcieved email from Dr. Vazquez saying that this culture does not need treatment, is likely a contaminant.

## 2024-04-05 LAB — BACTERIA BLD CULT: NO GROWTH

## 2024-04-08 ENCOUNTER — LAB (OUTPATIENT)
Dept: LAB | Facility: CLINIC | Age: 54
End: 2024-04-08
Attending: TRANSPLANT SURGERY
Payer: COMMERCIAL

## 2024-04-08 ENCOUNTER — OFFICE VISIT (OUTPATIENT)
Dept: TRANSPLANT | Facility: CLINIC | Age: 54
End: 2024-04-08
Attending: TRANSPLANT SURGERY
Payer: COMMERCIAL

## 2024-04-08 ENCOUNTER — MYC MEDICAL ADVICE (OUTPATIENT)
Dept: EDUCATION SERVICES | Facility: CLINIC | Age: 54
End: 2024-04-08

## 2024-04-08 VITALS
SYSTOLIC BLOOD PRESSURE: 106 MMHG | HEART RATE: 78 BPM | WEIGHT: 199.5 LBS | BODY MASS INDEX: 30.33 KG/M2 | DIASTOLIC BLOOD PRESSURE: 73 MMHG | OXYGEN SATURATION: 95 %

## 2024-04-08 DIAGNOSIS — K70.11 ALCOHOLIC HEPATITIS WITH ASCITES (H): Chronic | ICD-10-CM

## 2024-04-08 DIAGNOSIS — Z94.4 LIVER REPLACED BY TRANSPLANT (H): ICD-10-CM

## 2024-04-08 LAB
ALBUMIN SERPL BCG-MCNC: 3 G/DL (ref 3.5–5.2)
ALP SERPL-CCNC: 131 U/L (ref 40–150)
ALT SERPL W P-5'-P-CCNC: 6 U/L (ref 0–70)
ANION GAP SERPL CALCULATED.3IONS-SCNC: 7 MMOL/L (ref 7–15)
AST SERPL W P-5'-P-CCNC: 20 U/L (ref 0–45)
BILIRUB DIRECT SERPL-MCNC: 0.41 MG/DL (ref 0–0.3)
BILIRUB SERPL-MCNC: 0.7 MG/DL
BUN SERPL-MCNC: 12.6 MG/DL (ref 6–20)
CALCIUM SERPL-MCNC: 8.9 MG/DL (ref 8.6–10)
CHLORIDE SERPL-SCNC: 106 MMOL/L (ref 98–107)
CREAT SERPL-MCNC: 1.3 MG/DL (ref 0.67–1.17)
DEPRECATED HCO3 PLAS-SCNC: 27 MMOL/L (ref 22–29)
EGFRCR SERPLBLD CKD-EPI 2021: 66 ML/MIN/1.73M2
ERYTHROCYTE [DISTWIDTH] IN BLOOD BY AUTOMATED COUNT: 15.9 % (ref 10–15)
GLUCOSE SERPL-MCNC: 90 MG/DL (ref 70–99)
HCT VFR BLD AUTO: 31.6 % (ref 40–53)
HGB BLD-MCNC: 10.6 G/DL (ref 13.3–17.7)
MAGNESIUM SERPL-MCNC: 1.4 MG/DL (ref 1.7–2.3)
MCH RBC QN AUTO: 31.5 PG (ref 26.5–33)
MCHC RBC AUTO-ENTMCNC: 33.5 G/DL (ref 31.5–36.5)
MCV RBC AUTO: 94 FL (ref 78–100)
PHOSPHATE SERPL-MCNC: 4.1 MG/DL (ref 2.5–4.5)
PLATELET # BLD AUTO: 189 10E3/UL (ref 150–450)
POTASSIUM SERPL-SCNC: 5.3 MMOL/L (ref 3.4–5.3)
PROT SERPL-MCNC: 5.9 G/DL (ref 6.4–8.3)
RBC # BLD AUTO: 3.36 10E6/UL (ref 4.4–5.9)
SODIUM SERPL-SCNC: 140 MMOL/L (ref 135–145)
TACROLIMUS BLD-MCNC: 10 UG/L (ref 5–15)
TME LAST DOSE: NORMAL H
TME LAST DOSE: NORMAL H
WBC # BLD AUTO: 4.7 10E3/UL (ref 4–11)

## 2024-04-08 PROCEDURE — 82248 BILIRUBIN DIRECT: CPT | Performed by: PATHOLOGY

## 2024-04-08 PROCEDURE — 99000 SPECIMEN HANDLING OFFICE-LAB: CPT | Performed by: PATHOLOGY

## 2024-04-08 PROCEDURE — 80053 COMPREHEN METABOLIC PANEL: CPT | Performed by: PATHOLOGY

## 2024-04-08 PROCEDURE — 87516 HEPATITIS B DNA AMP PROBE: CPT | Performed by: TRANSPLANT SURGERY

## 2024-04-08 PROCEDURE — 99213 OFFICE O/P EST LOW 20 MIN: CPT | Mod: 24 | Performed by: TRANSPLANT SURGERY

## 2024-04-08 PROCEDURE — 85027 COMPLETE CBC AUTOMATED: CPT | Performed by: PATHOLOGY

## 2024-04-08 PROCEDURE — G0480 DRUG TEST DEF 1-7 CLASSES: HCPCS | Mod: 90 | Performed by: PATHOLOGY

## 2024-04-08 PROCEDURE — 36415 COLL VENOUS BLD VENIPUNCTURE: CPT | Performed by: PATHOLOGY

## 2024-04-08 PROCEDURE — 80197 ASSAY OF TACROLIMUS: CPT | Performed by: TRANSPLANT SURGERY

## 2024-04-08 PROCEDURE — 84100 ASSAY OF PHOSPHORUS: CPT | Performed by: PATHOLOGY

## 2024-04-08 PROCEDURE — 83735 ASSAY OF MAGNESIUM: CPT | Performed by: PATHOLOGY

## 2024-04-08 PROCEDURE — 99213 OFFICE O/P EST LOW 20 MIN: CPT | Performed by: TRANSPLANT SURGERY

## 2024-04-08 RX ORDER — FLUDROCORTISONE ACETATE 0.1 MG/1
0.1 TABLET ORAL DAILY
Qty: 30 TABLET | Refills: 0 | Status: SHIPPED | OUTPATIENT
Start: 2024-04-08 | End: 2024-05-04

## 2024-04-08 RX ORDER — FLUDROCORTISONE ACETATE 0.1 MG/1
0.1 TABLET ORAL DAILY
Status: DISCONTINUED | OUTPATIENT
Start: 2024-04-08 | End: 2024-04-08

## 2024-04-08 RX ORDER — TRAMADOL HYDROCHLORIDE 50 MG/1
50 TABLET ORAL EVERY 6 HOURS PRN
Qty: 20 TABLET | Refills: 0 | Status: SHIPPED | OUTPATIENT
Start: 2024-04-08 | End: 2024-04-15

## 2024-04-08 NOTE — NURSING NOTE
Chief Complaint   Patient presents with    Follow Up     Liver transplant 3/5/2024       /73 (BP Location: Right arm, Patient Position: Sitting, Cuff Size: Adult Large)   Pulse 78   Wt 90.5 kg (199 lb 8 oz)   SpO2 95%   BMI 30.33 kg/m      CHUCK ORTEGA, RN on 4/8/2024 at 10:31 AM

## 2024-04-08 NOTE — LETTER
4/8/2024         RE: Mary Anayarowan Lopez  [address]  [place] [address]        Dear Colleague,    Thank you for referring your patient, Mary Lopez, to the [facility]. Please see a copy of my visit note below.    Transplant Surgery -OUTPATIENT IMMUNOSUPPRESSION PROGRESS NOTE    Date of Visit: 04/08/2024    Transplants:  3/5/2024 (Liver); Postoperative day:  34  ASSESMENT AND PLAN:  1.Graft Function:Liver allograft: no rejection or technical problems.    2.Immunosuppression Management: keep tacrolimus levels at 10  3.Hypertension:ok  4.Renal Function:better  5.Lab frequency: twice weekly  6.Other:  Hyperkalemia: start florinef    Date: April 8, 2024    Transplant:  [x]                             Liver [x]                              Kidney []                             Pancreas []                              Other:             Chief Complaint:  Doing well  History of Present Illness:  Patient Active Problem List   Diagnosis     Primary hypertension     Tobacco abuse disorder     Mild hyperlipidemia     Morbid obesity (H)     Portal hypertensive gastropathy (H)     Secondary esophageal 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 (H)     Anemia due to unknown mechanism     Persistent insomnia     Bilateral leg edema     Hyponatremia     Alcohol use disorder, severe, in early remission (H)     Gastric varices     Severe malnutrition (H24)     Type 2 diabetes mellitus without complication, with long-term current use of insulin (H)     Generalized muscle weakness     Pulmonary HTN (H)     Hepatic encephalopathy (H)     ANGEL (acute kidney injury) (H24)     Sepsis with acute liver failure and septic shock without hepatic coma, due to unspecified organism (H)     Acute kidney failure, unspecified (H)     Delirium     Liver replaced by transplant (H)     Immunosuppressed status (H24)     Liver transplant rejection (H)     Acute post-operative pain     Atrial fibrillation with RVR (H)      Pleural effusion     Anemia due to blood loss, acute     Hypomagnesemia     Hypervolemia     Unspecified abdominal pain     Hyperkalemia     SOCIAL /FAMILY HISTORY: [x]                  No recent change    Past Medical History:   Diagnosis Date     ANGEL (acute kidney injury) (H24)      Alcoholic cirrhosis of liver without ascites (H) 2023     Alcoholic hepatitis with ascites (H28) 10/03/2023     Alcoholic hepatitis without ascites (H28) 2023     Closed fracture of one rib of left side 2023     Concussion without loss of consciousness 2020     Decompensated hepatic cirrhosis (H) 09/15/2023     Diabetes mellitus, type 2 (H) 2023     Essential hypertension 2020     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)      Liver replaced by transplant (H) 2024     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    ACR PRAJAPATI 6-, treated with steroids     Mild hyperlipidemia 2021     Persistent insomnia 2023     Portal hypertension (H) 2023     Scrotal abscess      Secondary esophageal 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 (H) 2023     Tobacco abuse disorder 2020     Type 2 diabetes mellitus with hyperglycemia (H) 2023     Past Surgical History:   Procedure Laterality Date     BENCH LIVER  3/5/2024    Procedure: Bench liver;  Surgeon: Ryder Cortez MD;  Location: [facility] OR     CHOLECYSTECTOMY       COLONOSCOPY N/A 2024    Procedure: COLONOSCOPY, WITH POLYPECTOMY;  Surgeon: Jak Urbina MD;  Location: [facility]     CV RIGHT HEART CATH MEASUREMENTS RECORDED N/A 2024    Procedure: Right Heart Catheterization;  Surgeon: Alfred Tafoya MD;  Location:  HEART CARDIAC CATH LAB     IR LIVER BIOPSY PERCUTANEOUS  3/15/2024     IR RETROPERITONEAL ABSCESS DRAINAGE  2024     TONSILLECTOMY       TRANSPLANT LIVER RECIPIENT  DONOR N/A 3/5/2024    Procedure: Transplant liver recipient  donor, bile duct stent placement;  Surgeon:  Ryder Cortez MD;  Location: [facility] OR     VASECTOMY       Social History     Socioeconomic History     Marital status:      Spouse name: Not on file     Number of children: Not on file     Years of education: Not on file     Highest education level: Not on file   Occupational History     Occupation: Not working now   Tobacco Use     Smoking status: Former     Types: Cigarettes     Passive exposure: Never     Smokeless tobacco: Current     Types: Chew     Last attempt to quit: 1/1/2004     Tobacco comments:     Chew daily 1/8 of a tin per day   Vaping Use     Vaping Use: Never used   Substance and Sexual Activity     Alcohol use: Not Currently     Alcohol/week: 12.0 standard drinks of alcohol     Types: 12 Standard drinks or equivalent per week     Comment: Sober since 6/25/2023     Drug use: Not Currently     Sexual activity: Yes     Partners: Female     Birth control/protection: Male Surgical   Other Topics Concern     Parent/sibling w/ CABG, MI or angioplasty before 65F 55M? No   Social History Narrative     Not on file     Social Determinants of Health     Financial Resource Strain: Low Risk  (12/22/2023)    Financial Resource Strain      Within the past 12 months, have you or your family members you live with been unable to get utilities (heat, electricity) when it was really needed?: No   Food Insecurity: Low Risk  (12/22/2023)    Food Insecurity      Within the past 12 months, did you worry that your food would run out before you got money to buy more?: No      Within the past 12 months, did the food you bought just not last and you didn t have money to get more?: No   Transportation Needs: Low Risk  (12/22/2023)    Transportation Needs      Within the past 12 months, has lack of transportation kept you from medical appointments, getting your medicines, non-medical meetings or appointments, work, or from getting things that you need?: No   Physical Activity: Not on file   Stress: Not on file    Social Connections: Not on file   Interpersonal Safety: Low Risk  (2023)    Interpersonal Safety      Do you feel physically and emotionally safe where you currently live?: Yes      Within the past 12 months, have you been hit, slapped, kicked or otherwise physically hurt by someone?: No      Within the past 12 months, have you been humiliated or emotionally abused in other ways by your partner or ex-partner?: No   Housing Stability: Low Risk  (2023)    Housing Stability      Do you have housing? : Yes      Are you worried about losing your housing?: No     Prescription Medications as of 2024         Rx Number Disp Refills Start End Last Dispensed Date Next Fill Date Owning Pharmacy    acetaminophen (TYLENOL) 500 MG tablet  30 tablet 0 3/21/2024 --   [address]    Sig: Take 1-2 tablets (500-1,000 mg) by mouth every 6 hours as needed for mild pain    Class: E-Prescribe    Route: Oral    aspirin (ASA) 325 MG tablet  30 tablet 5 3/22/2024 --   [address]    Si tablet (325 mg) by Oral or Feeding Tube route daily    Class: E-Prescribe    Route: Oral or Feeding Tube    blood glucose (NO BRAND SPECIFIED) test strip  100 strip 6 10/24/2023 --   [facility] #[id number] - [address]    Sig: Use to test blood sugar two times daily or as directed. To accompany: Blood Glucose Monitor Brands: per insurance.    Class: E-Prescribe    blood glucose monitoring (NO BRAND SPECIFIED) meter device kit  1 kit 0 10/24/2023 --   [facility] #[id number] - [place], [place] - [address] [facility]    Sig: Use to test blood sugar twice times daily or as directed. Preferred blood glucose meter OR supplies to accompany: Blood Glucose Monitor Brands: per insurance.    Class: E-Prescribe    Continuous Blood Gluc Sensor (DEXCOM G7 SENSOR)  MISC  3 each 5 3/21/2024 --   [address]    Sig: Change every 10 days.    Class: E-Prescribe    Continuous Blood Gluc Sensor (DEXCOM G7 SENSOR) MISC  3 each 5 11/16/2023 --   [facility] #[id number] - [place] - [address]  AT [facility]    Sig: Change every 10 days.    Class: E-Prescribe    Glucagon (GVOKE HYPOPEN) 1 MG/0.2ML pen  0.4 mL 1 3/21/2024 --   [address]    Sig: Inject the contents of 1 device under the skin into lower abdomen, outer thigh, or outer upper arm as needed for hypoglycemia. If no response after 15 minutes, additional 1 mg dose from a new device may be injected while waiting for emergency assistance.    Class: E-Prescribe    Notes to Pharmacy: Replaces [id number] per insurance formulary    hydrOXYzine HCl (ATARAX) 25 MG tablet  20 tablet 0 4/2/2024 --   [address]    Sig: Take 1 tablet (25 mg) by mouth every 6 hours as needed for other (adjuvant pain)    Class: E-Prescribe    Route: Oral    insulin aspart (NOVOLOG PEN) 100 UNIT/ML pen  15 mL 1 3/21/2024 --   [address]    Sig: Inject 1-10 Units Subcutaneous 3 times daily (before meals) Humalog 4 units with meals plus correction scale 1:50 >140 TID AC and 1:50 >200 HS Pre-meal Humalog correction scale: Do Not give Correction Insulin if Pre-Meal BG less than 140. For Pre-Meal  - 189 give 1 unit. For Pre-Meal  - 239 give 2 units. For Pre-Meal  - 289 give 3 units. For Pre-Meal  - 339 give 4 units. For Pre-Meal - 399 give 5 units. For Pre-Meal -449 give 6 units For Pre-Meal BG greater than or equal to 450 give 7 units. To be given with prandial insulin, and based on pre-meal blood glucose. Bedtime Humalog correction scale: Do Not give Bedtime Correction  Insulin if BG less than 200. For  - 249 give 1 units. For  - 299 give 2 units. For  - 349 give 3 units. For  -399 give 4 units. For BG greater than or equal to 400 give 5 units. Notify provider if glucose greater than or equal to 350 mg/dL after administration of correction dose.    Class: E-Prescribe    Route: Subcutaneous    Insulin Glargine-yfgn (SEMGLEE, YFGN,) 100 UNIT/ML SOLN  -- -- 4/2/2024 --       Sig: Inject 5 Units Subcutaneous daily    Class: No Print Out    Route: Subcutaneous    insulin pen needle (32G X 4 MM) 32G X 4 MM miscellaneous  200 each 1 3/21/2024 --   [address]    Sig: Use as directed by provider Per insurance coverage    Class: E-Prescribe    insulin pen needle (BD PEN NEEDLE SHERRIE 2ND GEN) 32G X 4 MM miscellaneous  100 each 11 1/8/2024 --   [id number] - [facility] [address] [facility]    Sig: USES 5 PER DAY    Class: E-Prescribe    magnesium oxide (MAG-OX) 400 MG tablet  120 tablet 11 4/2/2024 --   [address]    Sig: Take 2 tablets (800 mg) by mouth 2 times daily    Class: E-Prescribe    Route: Oral    melatonin 10 MG TABS tablet  -- -- 8/3/2023 --       Sig: Take 1 tablet (10 mg) by mouth nightly as needed for sleep    Class: OTC    Route: Oral    multivitamin w/minerals (THERA-VIT-M) tablet  90 tablet 1 2/2/2024 --   [facility] #[id number] - [place], [place] - [address] [facility]    Sig: TAKE 1 TABLET BY MOUTH DAILY    Class: E-Prescribe    Route: Oral    Renewals       Renewal provider: Jimmie Hoyt MD            mycophenolate (GENERIC EQUIVALENT) 250 MG capsule  180 capsule 11 3/21/2024 --   [address]    Sig: Take 3 capsules (750 mg) by mouth 2 times daily    Class: E-Prescribe    Route: Oral    omeprazole (PRILOSEC)  20 MG DR capsule  180 capsule 1 8/3/2023 --       Sig: Take 1 capsule (20 mg) by mouth 2 times daily    Class: Historical    Route: Oral    oxyCODONE (ROXICODONE) 5 MG tablet  10 tablet 0 2024 --   [address]    Sig: Take 1 tablet (5 mg) by mouth every 6 hours as needed for moderate pain Wean as able    Class: E-Prescribe    Earliest Fill Date: 2024    Route: Oral    polyethylene glycol (MIRALAX) 17 GM/Dose powder  510 g 1 3/21/2024 --   [address]    Sig: Take 17 g by mouth daily    Class: E-Prescribe    Route: Oral    predniSONE (DELTASONE) 5 MG tablet  90 tablet 3 3/28/2024 --   [facility] #[id number] - [place] - [address] [facility] AT [facility]    Sig: Take 1 tablet (5 mg) by mouth daily    Class: E-Prescribe    Notes to Pharmacy: TXP DT 3/5/2024 (Liver) TXP Dischg DT 3/21/2024 DX Liver replaced by transplant Z94.4 TX [facility] ([place], [place])    Route: Oral    senna-docusate (SENOKOT-S/PERICOLACE) 8.6-50 MG tablet  60 tablet 1 3/21/2024 --   [address]    Si-2 tablets by Oral or Feeding Tube route 2 times daily as needed for constipation    Class: E-Prescribe    Route: Oral or Feeding Tube    sodium bicarbonate 650 MG tablet  120 tablet 3 2024 --   [address]    Sig: Take 2 tablets (1,300 mg) by mouth 2 times daily    Class: E-Prescribe    Route: Oral    sulfamethoxazole-trimethoprim (BACTRIM) 400-80 MG tablet  30 tablet 5 3/21/2024 --   [address]    Sig: Take 1 tablet by mouth every evening    Class: E-Prescribe    Route: Oral    tacrolimus (GENERIC EQUIVALENT) 1 MG capsule  180 capsule 3 4/3/2024 --   [facility] #[id number] -  [place], [place] - [address] AT [facility]    Sig: Take 1 capsule (1 mg) by mouth every 12 hours Total dos: 6mg BID    Class: E-Prescribe    Notes to Pharmacy: TXP DT 3/5/2024 (Liver) TXP Dischg DT 3/21/2024 DX Liver replaced by transplant Z94.4 TX [facility] ([place], [place])    Route: Oral    tacrolimus (GENERIC EQUIVALENT) 5 MG capsule  180 capsule 3 4/3/2024 --   [facility] #[id number] - [email or web address] - [address]  AT [facility]    Sig: Take 1 capsule (5 mg) by mouth every 12 hours Total dose: 6mg BID    Class: E-Prescribe    Notes to Pharmacy: TXP DT 3/5/2024 (Liver) TXP Dischg DT 3/21/2024 DX Liver replaced by transplant Z94.4 [facility] ([place], [place])    Route: Oral    thin (NO BRAND SPECIFIED) lancets  100 each 6 10/24/2023 --   [facility] #[id number] - [place], [place] - [address]  AT [facility]    Sig: Use with lanceting device. To accompany: Blood Glucose Monitor Brands: per insurance.    Class: E-Prescribe    ursodiol (ACTIGALL) 300 MG capsule  60 capsule 5 3/21/2024 --   [facility] [place], [place] - [address] [facility]    Sig: Take 1 capsule (300 mg) by mouth 2 times daily    Class: E-Prescribe    Route: Oral    valGANciclovir (VALCYTE) 450 MG tablet  30 tablet 2 3/21/2024 --   [facility] [address] [facility]    Sig: Take 1 tablet (450 mg) by mouth every evening    Class: E-Prescribe    Route: Oral          Other food allergy and Pineapple   REVIEW OF SYSTEMS (check box if normal)  [x]               GENERAL  [x]                 PULMONARY [x]                GENITOURINARY  [x]                CNS                 [x]                 CARDIAC  [x]                 ENDOCRINE  [x]                EARS,NOSE,THROAT [x]                 GASTROINTESTINAL [x]                 NEUROLOGIC    [x]                 DORINA  [x]                  HEMATOLOGY      PHYSICAL EXAM (check box if normal)/73 (BP Location: Right arm, Patient Position: Sitting, Cuff Size: Adult Large)   Pulse 78   Wt 90.5 kg (199 lb 8 oz)   SpO2 95%   BMI 30.33 kg/m          [x]            GENERAL:    [x]       EYES:  ICTERIC   []        YES  []                    NO  [x]           EXTREMITIES: Clubbing []       Y     [x]           N    [x]           EARS, NOSE, THROAT: Membranes Moist    YES   [x]                   NO []                  [x]           LUNGS:  CLEAR    YES       [x]                  NO    []                                [x]           SKIN: Jaundice           YES       []                  NO    [x]                   Rash: YES       []                  NO    [x]                                     [x]             HEART: Regular Rate          YES       [x]                  NO    []                   Incision Clean:  YES       [x]                  NO    []                                [x]                    ABDOMEN: Organomegaly YES       []                  NO    [x]                       [x]                    NEUROLOGICAL:  Nonfocal  YES       [x]                  NO    []                       [x]                    Hernia YES       []                  NO    [x]                   PSYCHIATRIC:  Appropriate  YES       [x]                  NO    []                       OTHER:                                                                                                   PAIN SCALE:: 3       Again, thank you for allowing me to participate in the care of your patient.        Sincerely,        Ryder Cortez MD

## 2024-04-08 NOTE — PROGRESS NOTES
Transplant Surgery -OUTPATIENT IMMUNOSUPPRESSION PROGRESS NOTE    Date of Visit: 04/08/2024    Transplants:  3/5/2024 (Liver); Postoperative day:  34  ASSESMENT AND PLAN:  1.Graft Function:Liver allograft: no rejection or technical problems.    2.Immunosuppression Management: keep tacrolimus levels at 10  3.Hypertension:ok  4.Renal Function:better  5.Lab frequency: twice weekly  6.Other:  Hyperkalemia: start florinef    Date: April 8, 2024    Transplant:  [x]                             Liver [x]                              Kidney []                             Pancreas []                              Other:             Chief Complaint:  Doing well  History of Present Illness:  Patient Active Problem List   Diagnosis    Primary hypertension    Tobacco abuse disorder    Mild hyperlipidemia    Morbid obesity (H)    Portal hypertensive gastropathy (H)    Secondary esophageal 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 (H)    Anemia due to unknown mechanism    Persistent insomnia    Bilateral leg edema    Hyponatremia    Alcohol use disorder, severe, in early remission (H)    Gastric varices    Severe malnutrition (H24)    Type 2 diabetes mellitus without complication, with long-term current use of insulin (H)    Generalized muscle weakness    Pulmonary HTN (H)    Hepatic encephalopathy (H)    ANGEL (acute kidney injury) (H24)    Sepsis with acute liver failure and septic shock without hepatic coma, due to unspecified organism (H)    Acute kidney failure, unspecified (H)    Delirium    Liver replaced by transplant (H)    Immunosuppressed status (H24)    Liver transplant rejection (H)    Acute post-operative pain    Atrial fibrillation with RVR (H)    Pleural effusion    Anemia due to blood loss, acute    Hypomagnesemia    Hypervolemia    Unspecified abdominal pain    Hyperkalemia     SOCIAL /FAMILY HISTORY: [x]                  No recent change    Past Medical History:   Diagnosis Date    ANGEL (acute kidney injury) (H24)     Alcoholic  cirrhosis of liver without ascites (H) 2023    Alcoholic hepatitis with ascites (H28) 10/03/2023    Alcoholic hepatitis without ascites (H28) 2023    Closed fracture of one rib of left side 2023    Concussion without loss of consciousness 2020    Decompensated hepatic cirrhosis (H) 09/15/2023    Diabetes mellitus, type 2 (H) 2023    Essential hypertension 2020    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)     Liver replaced by transplant (H) 2024    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    ACR PRAJAPATI 6-7, treated with steroids    Mild hyperlipidemia 2021    Persistent insomnia 2023    Portal hypertension (H) 2023    Scrotal abscess     Secondary esophageal 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 (H) 2023    Tobacco abuse disorder 2020    Type 2 diabetes mellitus with hyperglycemia (H) 2023     Past Surgical History:   Procedure Laterality Date    BENCH LIVER  3/5/2024    Procedure: Bench liver;  Surgeon: Ryder Cortez MD;  Location: [facility]    CHOLECYSTECTOMY      COLONOSCOPY N/A 2024    Procedure: COLONOSCOPY, WITH POLYPECTOMY;  Surgeon: Jak Urbina MD;  Location: [facility]    CV RIGHT HEART CATH MEASUREMENTS RECORDED N/A 2024    Procedure: Right Heart Catheterization;  Surgeon: Alfred Tafoya MD;  [facility]    IR LIVER BIOPSY PERCUTANEOUS  3/15/2024    IR RETROPERITONEAL ABSCESS DRAINAGE  2024    TONSILLECTOMY      TRANSPLANT LIVER RECIPIENT  DONOR N/A 3/5/2024    Procedure: Transplant liver recipient  donor, bile duct stent placement;  Surgeon: Ryder Cortez MD;  Location: [facility]    VASECTOMY       Social History     Socioeconomic History    Marital status:      Spouse name: Not on file    Number of children: Not on file    Years of education: Not on file    Highest education level: Not on file   Occupational History    Occupation: Not working now    Tobacco Use    Smoking status: Former     Types: Cigarettes     Passive exposure: Never    Smokeless tobacco: Current     Types: Chew     Last attempt to quit: 1/1/2004    Tobacco comments:     Chew daily 1/8 of a tin per day   Vaping Use    Vaping Use: Never used   Substance and Sexual Activity    Alcohol use: Not Currently     Alcohol/week: 12.0 standard drinks of alcohol     Types: 12 Standard drinks or equivalent per week     Comment: Sober since 6/25/2023    Drug use: Not Currently    Sexual activity: Yes     Partners: Female     Birth control/protection: Male Surgical   Other Topics Concern    Parent/sibling w/ CABG, MI or angioplasty before 65F 55M? No   Social History Narrative    Not on file     Social Determinants of Health     Financial Resource Strain: Low Risk  (12/22/2023)    Financial Resource Strain     Within the past 12 months, have you or your family members you live with been unable to get utilities (heat, electricity) when it was really needed?: No   Food Insecurity: Low Risk  (12/22/2023)    Food Insecurity     Within the past 12 months, did you worry that your food would run out before you got money to buy more?: No     Within the past 12 months, did the food you bought just not last and you didn t have money to get more?: No   Transportation Needs: Low Risk  (12/22/2023)    Transportation Needs     Within the past 12 months, has lack of transportation kept you from medical appointments, getting your medicines, non-medical meetings or appointments, work, or from getting things that you need?: No   Physical Activity: Not on file   Stress: Not on file   Social Connections: Not on file   Interpersonal Safety: Low Risk  (12/22/2023)    Interpersonal Safety     Do you feel physically and emotionally safe where you currently live?: Yes     Within the past 12 months, have you been hit, slapped, kicked or otherwise physically hurt by someone?: No     Within the past 12 months, have you been  humiliated or emotionally abused in other ways by your partner or ex-partner?: No   Housing Stability: Low Risk  (2023)    Housing Stability     Do you have housing? : Yes     Are you worried about losing your housing?: No     Prescription Medications as of 2024         Rx Number Disp Refills Start End Last Dispensed Date Next Fill Date Owning Pharmacy    acetaminophen (TYLENOL) 500 MG tablet  30 tablet 0 3/21/2024 --   [address]    Sig: Take 1-2 tablets (500-1,000 mg) by mouth every 6 hours as needed for mild pain    Class: E-Prescribe    Route: Oral    aspirin (ASA) 325 MG tablet  30 tablet 5 3/22/2024 --   [address]    Si tablet (325 mg) by Oral or Feeding Tube route daily    Class: E-Prescribe    Route: Oral or Feeding Tube    blood glucose (NO BRAND SPECIFIED) test strip  100 strip 6 10/24/2023 --   [id number] [address] [facility]    Sig: Use to test blood sugar two times daily or as directed. To accompany: Blood Glucose Monitor Brands: per insurance.    Class: E-Prescribe    blood glucose monitoring (NO BRAND SPECIFIED) meter device kit  1 kit 0 10/24/2023 --   [employer] [facility] #[id number] - [facility] [address]    Sig: Use to test blood sugar twice times daily or as directed. Preferred blood glucose meter OR supplies to accompany: Blood Glucose Monitor Brands: per insurance.    Class: E-Prescribe    Continuous Blood Gluc Sensor (DEXCOM G7 SENSOR) MISC  3 each 5 3/21/2024 --   [address]    Sig: Change every 10 days.    Class: E-Prescribe    Continuous Blood Gluc Sensor (DEXCOM G7 SENSOR) MISC  3 each 5 2023 --   [facility] #[id number] - [place] - [address]    Sig: Change every  10 days.    Class: E-Prescribe    Glucagon (GVOKE HYPOPEN) 1 MG/0.2ML pen  0.4 mL 1 3/21/2024 --   [address]    Sig: Inject the contents of 1 device under the skin into lower abdomen, outer thigh, or outer upper arm as needed for hypoglycemia. If no response after 15 minutes, additional 1 mg dose from a new device may be injected while waiting for emergency assistance.    Class: E-Prescribe    Notes to Pharmacy: Replaces [id number] per insurance formulary    hydrOXYzine HCl (ATARAX) 25 MG tablet  20 tablet 0 4/2/2024 --   [address]    Sig: Take 1 tablet (25 mg) by mouth every 6 hours as needed for other (adjuvant pain)    Class: E-Prescribe    Route: Oral    insulin aspart (NOVOLOG PEN) 100 UNIT/ML pen  15 mL 1 3/21/2024 --   [address]    Sig: Inject 1-10 Units Subcutaneous 3 times daily (before meals) Humalog 4 units with meals plus correction scale 1:50 >140 TID AC and 1:50 >200 HS Pre-meal Humalog correction scale: Do Not give Correction Insulin if Pre-Meal BG less than 140. For Pre-Meal  - 189 give 1 unit. For Pre-Meal  - 239 give 2 units. For Pre-Meal  - 289 give 3 units. For Pre-Meal  - 339 give 4 units. For Pre-Meal - 399 give 5 units. For Pre-Meal -449 give 6 units For Pre-Meal BG greater than or equal to 450 give 7 units. To be given with prandial insulin, and based on pre-meal blood glucose. Bedtime Humalog correction scale: Do Not give Bedtime Correction Insulin if BG less than 200. For  - 249 give 1 units. For  - 299 give 2 units. For  - 349 give 3 units. For  -399 give 4 units. For BG greater than or equal to 400 give 5 units. Notify provider if glucose greater than or equal to 350 mg/dL after administration of correction dose.    Class: E-Prescribe    Route:  Subcutaneous    Insulin Glargine-yfgn (SEMGLEE, YFGN,) 100 UNIT/ML SOLN  -- -- 4/2/2024 --       Sig: Inject 5 Units Subcutaneous daily    Class: No Print Out    Route: Subcutaneous    insulin pen needle (32G X 4 MM) 32G X 4 MM miscellaneous  200 each 1 3/21/2024 --   [address]    Sig: Use as directed by provider Per insurance coverage    Class: E-Prescribe    insulin pen needle (BD PEN NEEDLE SHERRIE 2ND GEN) 32G X 4 MM miscellaneous  100 each 11 1/8/2024 --   [facility] #[id number] - [address]    Sig: USES 5 PER DAY    Class: E-Prescribe    magnesium oxide (MAG-OX) 400 MG tablet  120 tablet 11 4/2/2024 --   [address]    Sig: Take 2 tablets (800 mg) by mouth 2 times daily    Class: E-Prescribe    Route: Oral    melatonin 10 MG TABS tablet  -- -- 8/3/2023 --       Sig: Take 1 tablet (10 mg) by mouth nightly as needed for sleep    Class: OTC    Route: Oral    multivitamin w/minerals (THERA-VIT-M) tablet  90 tablet 1 2/2/2024 --   [facility] #[id number] - [facility] [address] [place] [address]    Sig: TAKE 1 TABLET BY MOUTH DAILY    Class: E-Prescribe    Route: Oral    Renewals       Renewal provider: Jimmie Hoyt MD            mycophenolate (GENERIC EQUIVALENT) 250 MG capsule  180 capsule 11 3/21/2024 --   [address]    Sig: Take 3 capsules (750 mg) by mouth 2 times daily    Class: E-Prescribe    Route: Oral    omeprazole (PRILOSEC) 20 MG DR capsule  180 capsule 1 8/3/2023 --       Sig: Take 1 capsule (20 mg) by mouth 2 times daily    Class: Historical    Route: Oral    oxyCODONE (ROXICODONE) 5 MG tablet  10 tablet 0 4/2/2024 --   [address]    Sig: Take 1 tablet (5 mg) by mouth every 6 hours as needed  for moderate pain Wean as able    Class: E-Prescribe    Earliest Fill Date: 2024    Route: Oral    polyethylene glycol (MIRALAX) 17 GM/Dose powder  510 g 1 3/21/2024 --   [address]    Sig: Take 17 g by mouth daily    Class: E-Prescribe    Route: Oral    predniSONE (DELTASONE) 5 MG tablet  90 tablet 3 3/28/2024 --   [id number] - [facility] [address] [facility]    Sig: Take 1 tablet (5 mg) by mouth daily    Class: E-Prescribe    Notes to Pharmacy: TXP DT 3/5/2024 (Liver) TXP Dischg DT 3/21/2024 DX Liver replaced by transplant Z94.4 [facility] ([place], [place])    Route: Oral    senna-docusate (SENOKOT-S/PERICOLACE) 8.6-50 MG tablet  60 tablet 1 3/21/2024 --   [address]    Si-2 tablets by Oral or Feeding Tube route 2 times daily as needed for constipation    Class: E-Prescribe    Route: Oral or Feeding Tube    sodium bicarbonate 650 MG tablet  120 tablet 3 2024 --   [address]    Sig: Take 2 tablets (1,300 mg) by mouth 2 times daily    Class: E-Prescribe    Route: Oral    sulfamethoxazole-trimethoprim (BACTRIM) 400-80 MG tablet  30 tablet 5 3/21/2024 --   [address]    Sig: Take 1 tablet by mouth every evening    Class: E-Prescribe    Route: Oral    tacrolimus (GENERIC EQUIVALENT) 1 MG capsule  180 capsule 3 4/3/2024 --   [facility] #[id number] - [place] - [address] [facility]    Sig: Take 1 capsule (1 mg) by mouth every 12 hours Total dos: 6mg BID    Class: E-Prescribe    Notes to Pharmacy: TXP DT 3/5/2024 (Liver) TXP Dischg DT 3/21/2024 DX Liver replaced by transplant Z94.4 [facility] ([facility]  [place])    Route: Oral    tacrolimus (GENERIC EQUIVALENT) 5 MG capsule  180 capsule 3 4/3/2024 --   [facility][id number] - [place], [place] - [address] [facility]    Sig: Take 1 capsule (5 mg) by mouth every 12 hours Total dose: 6mg BID    Class: E-Prescribe    Notes to Pharmacy: TXP DT 3/5/2024 (Liver) TXP Dischg DT 3/21/2024 DX Liver replaced by transplant Z94.4 [facility] ([place], [place])    Route: Oral    thin (NO BRAND SPECIFIED) lancets  100 each 6 10/24/2023 --   [facility] #[id number] - [place], [place] - [address]    Sig: Use with lanceting device. To accompany: Blood Glucose Monitor Brands: per insurance.    Class: E-Prescribe    ursodiol (ACTIGALL) 300 MG capsule  60 capsule 5 3/21/2024 --   [facility] [place], [place] - [address] [place] [address]    Sig: Take 1 capsule (300 mg) by mouth 2 times daily    Class: E-Prescribe    Route: Oral    valGANciclovir (VALCYTE) 450 MG tablet  30 tablet 2 3/21/2024 --   [address]    Sig: Take 1 tablet (450 mg) by mouth every evening    Class: E-Prescribe    Route: Oral          Other food allergy and Pineapple   REVIEW OF SYSTEMS (check box if normal)  [x]               GENERAL  [x]                 PULMONARY [x]                GENITOURINARY  [x]                CNS                 [x]                 CARDIAC  [x]                 ENDOCRINE  [x]                EARS,NOSE,THROAT [x]                 GASTROINTESTINAL [x]                 NEUROLOGIC    [x]                MUSCLOSKELTAL  [x]                  HEMATOLOGY      PHYSICAL EXAM (check box if normal)/73 (BP Location: Right arm, Patient Position: Sitting, Cuff Size: Adult Large)   Pulse 78   Wt 90.5 kg (199 lb 8 oz)   SpO2 95%   BMI 30.33 kg/m          [x]            GENERAL:    [x]       EYES:  ICTERIC   []        YES  []                     NO  [x]           EXTREMITIES: Clubbing []       Y     [x]           N    [x]           EARS, NOSE, THROAT: Membranes Moist    YES   [x]                   NO []                  [x]           LUNGS:  CLEAR    YES       [x]                  NO    []                                [x]           SKIN: Jaundice           YES       []                  NO    [x]                   Rash: YES       []                  NO    [x]                                     [x]             HEART: Regular Rate          YES       [x]                  NO    []                   Incision Clean:  YES       [x]                  NO    []                                [x]                    ABDOMEN: Organomegaly YES       []                  NO    [x]                       [x]                    NEUROLOGICAL:  Nonfocal  YES       [x]                  NO    []                       [x]                    Hernia YES       []                  NO    [x]                   PSYCHIATRIC:  Appropriate  YES       [x]                  NO    []                       OTHER:                                                                                                   PAIN SCALE:: 3

## 2024-04-09 ENCOUNTER — TELEPHONE (OUTPATIENT)
Dept: FAMILY MEDICINE | Facility: CLINIC | Age: 54
End: 2024-04-09
Payer: COMMERCIAL

## 2024-04-09 NOTE — TELEPHONE ENCOUNTER
Forms/Letter Request    Type of form/letter: [facility]   Order # [id number]    Do we have the form/letter: Yes:     Who is the form from? Home care    Where did/will the form come from? form was faxed in    When is form/letter needed by: asap    How would you like the form/letter returned: Fax : [phone]

## 2024-04-10 ENCOUNTER — TELEPHONE (OUTPATIENT)
Dept: TRANSPLANT | Facility: CLINIC | Age: 54
End: 2024-04-10

## 2024-04-10 ENCOUNTER — OFFICE VISIT (OUTPATIENT)
Dept: OPHTHALMOLOGY | Facility: CLINIC | Age: 54
End: 2024-04-10
Attending: OPTOMETRIST
Payer: COMMERCIAL

## 2024-04-10 DIAGNOSIS — H52.13 MYOPIA OF BOTH EYES: ICD-10-CM

## 2024-04-10 DIAGNOSIS — R63.0 POOR APPETITE: Primary | ICD-10-CM

## 2024-04-10 DIAGNOSIS — Z01.00 DIABETIC EYE EXAM (H): Primary | ICD-10-CM

## 2024-04-10 DIAGNOSIS — H25.13 NUCLEAR SENILE CATARACT OF BOTH EYES: ICD-10-CM

## 2024-04-10 DIAGNOSIS — E11.9 DIABETIC EYE EXAM (H): Primary | ICD-10-CM

## 2024-04-10 LAB
BACTERIA BLD CULT: ABNORMAL
BACTERIA BLD CULT: ABNORMAL
HBV DNA SERPL QL NAA+PROBE: NORMAL
HCV RNA SERPL QL NAA+PROBE: NORMAL
HIV1+2 RNA SERPL QL NAA+PROBE: NORMAL

## 2024-04-10 PROCEDURE — 92134 CPTRZ OPH DX IMG PST SGM RTA: CPT | Performed by: OPTOMETRIST

## 2024-04-10 PROCEDURE — 99213 OFFICE O/P EST LOW 20 MIN: CPT | Performed by: OPTOMETRIST

## 2024-04-10 PROCEDURE — 92004 COMPRE OPH EXAM NEW PT 1/>: CPT | Performed by: OPTOMETRIST

## 2024-04-10 PROCEDURE — 92015 DETERMINE REFRACTIVE STATE: CPT

## 2024-04-10 RX ORDER — MIRTAZAPINE 15 MG/1
15 TABLET, FILM COATED ORAL AT BEDTIME
Qty: 30 TABLET | Refills: 0 | Status: SHIPPED | OUTPATIENT
Start: 2024-04-10 | End: 2024-04-23

## 2024-04-10 ASSESSMENT — REFRACTION_MANIFEST
OD_SPHERE: -0.75
OS_ADD: +2.25
OS_CYLINDER: SPHERE
OD_ADD: +2.25
OD_CYLINDER: SPHERE
OS_SPHERE: -1.00

## 2024-04-10 ASSESSMENT — CONF VISUAL FIELD
OD_INFERIOR_NASAL_RESTRICTION: 0
OS_SUPERIOR_NASAL_RESTRICTION: 0
OS_INFERIOR_NASAL_RESTRICTION: 0
OS_SUPERIOR_TEMPORAL_RESTRICTION: 0
METHOD: COUNTING FINGERS
OD_NORMAL: 1
OD_SUPERIOR_TEMPORAL_RESTRICTION: 0
OD_INFERIOR_TEMPORAL_RESTRICTION: 0
OS_NORMAL: 1
OS_INFERIOR_TEMPORAL_RESTRICTION: 0
OD_SUPERIOR_NASAL_RESTRICTION: 0

## 2024-04-10 ASSESSMENT — EXTERNAL EXAM - RIGHT EYE: OD_EXAM: NORMAL

## 2024-04-10 ASSESSMENT — TONOMETRY
OS_IOP_MMHG: 17
OD_IOP_MMHG: 18
IOP_METHOD: ICARE

## 2024-04-10 ASSESSMENT — VISUAL ACUITY
METHOD: SNELLEN - LINEAR
OD_SC+: -1
OD_SC: 20/40
OS_SC: 20/50

## 2024-04-10 ASSESSMENT — SLIT LAMP EXAM - LIDS
COMMENTS: NORMAL
COMMENTS: NORMAL

## 2024-04-10 ASSESSMENT — EXTERNAL EXAM - LEFT EYE: OS_EXAM: NORMAL

## 2024-04-10 NOTE — NURSING NOTE
Chief Complaints and History of Present Illnesses   Patient presents with    Eye Exam For Diabetes     Chief Complaint(s) and History of Present Illness(es)       Eye Exam For Diabetes              Laterality: both eyes    Course: gradually worsening    Associated symptoms: dryness.  Negative for eye pain, flashes and floaters    Treatments tried: no treatments              Comments    Mary Lopez is a(n) 53 year old male who presents for a diabetic exam. Has not had a formal eye exam. Vision is gradually decreasing for both distance and near. Some dryness. No lubricating drops. No flashes and floaters. No eye pain.     Lab Results       Component                Value               Date                       A1C                      5.6                 04/01/2024                 A1C                      4.8                 02/25/2024                 A1C                      7.7                 12/19/2023                 A1C                      8.1                 10/31/2023                 A1C                      5.1                 08/23/2023              Jesus Lemus COT 12:12 PM April 10, 2024

## 2024-04-10 NOTE — TELEPHONE ENCOUNTER
"Call to patient (wife Adina)  for general follow-up post liver transplant.    Appetite: Not great. He's not hungry.  She hasn't been weighing him at home. Suggested that she start. Sent script for remeron 15 mg q hs.   Diarrhea: stool is soft, 2-3 times per day.    Nausea: some slight.   Activity: \"up and around\".  Encouraged him to get outside.   Pain management: pain is a 4-6 on a 1-10 scale.  Just got switched off oxycodone to tramadol.  Also using hydroxyzine and tylenol.  Will be out of hydroxyzine as of Sunday.  Encouraged to stop using this.  If needs more will discuss w/ Dr. Cortez.   Anxiety / depression: Maybe a little at night as he wants to make sure he is getting good sleep.  Discouraged napping for more than 20 minutes.   Physician follow-up:  Saw PCP.  SEeing ortho next week to have right shoulder evaluated.  Seeing Dr. Cortez on Monday 4/15.  Lab frequency:  Med changes: none    \"I still have some concerns about his cognition\".  Seems fuzzy about some things and forgets that he says certain things.  OT came out to see him, did some puzzles with him.  I suggested getting off of oxy, hydroxyzine may help.  Adina will try to observe his nausea and diarrhea.  IF this worsens will change to myfortic.  Early rejection, still on pred.  To ask if we can wean off at Thursday morning liver review meeting.                    "

## 2024-04-11 ENCOUNTER — LAB (OUTPATIENT)
Dept: LAB | Facility: CLINIC | Age: 54
End: 2024-04-11
Payer: COMMERCIAL

## 2024-04-11 ENCOUNTER — TELEPHONE (OUTPATIENT)
Dept: TRANSPLANT | Facility: CLINIC | Age: 54
End: 2024-04-11

## 2024-04-11 ENCOUNTER — TELEPHONE (OUTPATIENT)
Dept: FAMILY MEDICINE | Facility: CLINIC | Age: 54
End: 2024-04-11

## 2024-04-11 ENCOUNTER — VIRTUAL VISIT (OUTPATIENT)
Dept: FAMILY MEDICINE | Facility: CLINIC | Age: 54
End: 2024-04-11
Payer: COMMERCIAL

## 2024-04-11 ENCOUNTER — TELEPHONE (OUTPATIENT)
Dept: PHARMACY | Facility: CLINIC | Age: 54
End: 2024-04-11

## 2024-04-11 DIAGNOSIS — E87.5 HYPERKALEMIA: ICD-10-CM

## 2024-04-11 DIAGNOSIS — Z94.4 LIVER REPLACED BY TRANSPLANT (H): Primary | ICD-10-CM

## 2024-04-11 DIAGNOSIS — D64.9 ANEMIA DUE TO UNKNOWN MECHANISM: Chronic | ICD-10-CM

## 2024-04-11 DIAGNOSIS — Z94.4 LIVER REPLACED BY TRANSPLANT (H): ICD-10-CM

## 2024-04-11 DIAGNOSIS — N17.9 AKI (ACUTE KIDNEY INJURY) (H): ICD-10-CM

## 2024-04-11 DIAGNOSIS — Z94.4 LIVER REPLACED BY TRANSPLANT (H): Chronic | ICD-10-CM

## 2024-04-11 DIAGNOSIS — D84.9 IMMUNOSUPPRESSED STATUS (H): Primary | Chronic | ICD-10-CM

## 2024-04-11 DIAGNOSIS — E11.9 TYPE 2 DIABETES MELLITUS WITHOUT COMPLICATION, WITH LONG-TERM CURRENT USE OF INSULIN (H): Chronic | ICD-10-CM

## 2024-04-11 DIAGNOSIS — K76.89: ICD-10-CM

## 2024-04-11 DIAGNOSIS — Z79.4 TYPE 2 DIABETES MELLITUS WITHOUT COMPLICATION, WITH LONG-TERM CURRENT USE OF INSULIN (H): Chronic | ICD-10-CM

## 2024-04-11 LAB
ALBUMIN SERPL BCG-MCNC: 2.9 G/DL (ref 3.5–5.2)
ALP SERPL-CCNC: 121 U/L (ref 40–150)
ALT SERPL W P-5'-P-CCNC: 5 U/L (ref 0–70)
ANION GAP SERPL CALCULATED.3IONS-SCNC: 5 MMOL/L (ref 7–15)
AST SERPL W P-5'-P-CCNC: 18 U/L (ref 0–45)
BILIRUB DIRECT SERPL-MCNC: 0.36 MG/DL (ref 0–0.3)
BILIRUB SERPL-MCNC: 0.6 MG/DL
BUN SERPL-MCNC: 16.3 MG/DL (ref 6–20)
CALCIUM SERPL-MCNC: 9 MG/DL (ref 8.6–10)
CHLORIDE SERPL-SCNC: 106 MMOL/L (ref 98–107)
CREAT SERPL-MCNC: 1.44 MG/DL (ref 0.67–1.17)
DEPRECATED HCO3 PLAS-SCNC: 28 MMOL/L (ref 22–29)
EGFRCR SERPLBLD CKD-EPI 2021: 58 ML/MIN/1.73M2
ERYTHROCYTE [DISTWIDTH] IN BLOOD BY AUTOMATED COUNT: 15.9 % (ref 10–15)
GLUCOSE SERPL-MCNC: 89 MG/DL (ref 70–99)
HCT VFR BLD AUTO: 31.8 % (ref 40–53)
HGB BLD-MCNC: 10.4 G/DL (ref 13.3–17.7)
LABORATORY REPORT: NORMAL
MAGNESIUM SERPL-MCNC: 1.5 MG/DL (ref 1.7–2.3)
MCH RBC QN AUTO: 31 PG (ref 26.5–33)
MCHC RBC AUTO-ENTMCNC: 32.7 G/DL (ref 31.5–36.5)
MCV RBC AUTO: 95 FL (ref 78–100)
PETH INTERPRETATION: NORMAL
PHOSPHATE SERPL-MCNC: 4.6 MG/DL (ref 2.5–4.5)
PLATELET # BLD AUTO: 250 10E3/UL (ref 150–450)
PLPETH BLD-MCNC: <10 NG/ML
POPETH BLD-MCNC: <10 NG/ML
POTASSIUM SERPL-SCNC: 5.4 MMOL/L (ref 3.4–5.3)
PROT SERPL-MCNC: 5.6 G/DL (ref 6.4–8.3)
RBC # BLD AUTO: 3.35 10E6/UL (ref 4.4–5.9)
SODIUM SERPL-SCNC: 139 MMOL/L (ref 135–145)
TACROLIMUS BLD-MCNC: 9.7 UG/L (ref 5–15)
TME LAST DOSE: NORMAL H
TME LAST DOSE: NORMAL H
WBC # BLD AUTO: 3.9 10E3/UL (ref 4–11)

## 2024-04-11 PROCEDURE — 99214 OFFICE O/P EST MOD 30 MIN: CPT | Mod: 95 | Performed by: INTERNAL MEDICINE

## 2024-04-11 PROCEDURE — 83735 ASSAY OF MAGNESIUM: CPT

## 2024-04-11 PROCEDURE — 80197 ASSAY OF TACROLIMUS: CPT

## 2024-04-11 PROCEDURE — 84100 ASSAY OF PHOSPHORUS: CPT

## 2024-04-11 PROCEDURE — 82248 BILIRUBIN DIRECT: CPT

## 2024-04-11 PROCEDURE — 85027 COMPLETE CBC AUTOMATED: CPT

## 2024-04-11 PROCEDURE — 36415 COLL VENOUS BLD VENIPUNCTURE: CPT

## 2024-04-11 PROCEDURE — 80053 COMPREHEN METABOLIC PANEL: CPT

## 2024-04-11 RX ORDER — MYCOPHENOLIC ACID 180 MG/1
540 TABLET, DELAYED RELEASE ORAL 2 TIMES DAILY
Qty: 540 TABLET | Refills: 0 | Status: SHIPPED | OUTPATIENT
Start: 2024-04-11 | End: 2024-07-02

## 2024-04-11 NOTE — TELEPHONE ENCOUNTER
Forms/Letter Request    Type of form/letter: [facility]     Do we have the form/letter: Yes:     Who is the form from? Home care    Where did/will the form come from? form was faxed in    When is form/letter needed by: asap    How would you like the form/letter returned: Fax : [phone]

## 2024-04-11 NOTE — TELEPHONE ENCOUNTER
"Clinical Pharmacy Consult:                                                      Transplant Specific: 1 Month Post Transplant Medication Review  Date of Transplant: 03/05/2024  Type of Transplant: liver  First Transplant: yes  History of rejection: no    Immunosuppression Regimen   TAC 6mg qAM & 6mg qPM and Myforitic 540mg qAM & 540mg qPM  Patient specific goal: 8-10  Most recent level: 7.1, date 04/03/2024  Immunosuppressant Levels:  Subtherapeutic, dose increased  Pt adherent to lab draws: yes  Scr:   Lab Results   Component Value Date    CR 1.44 04/11/2024    CR 0.95 07/05/2020     Side effects: diarrhea    Prophylactic Medications  Antibacterial:  Bactrim SS daily  Scheduled Discontinue Date: 6 months    Antifungal: Nystatin  Scheduled Discontinue Date:  Discontinued 3/9/24    Antiviral: Max dose in Liver transplant is Valcyte 450mg once daily   Scheduled Discontinue Date: 3 months    Acid Reducer: Prilosec (omeprazole)  Scheduled Reviewed Date:  MD Determination    Thrombosis Prevention: Aspirin 325 mg PO daily  Scheduled Discontinue Date:  MD Determination    Blood Pressure Management  Frequency of home Blood Pressure checks: rarely  Most recent home BP: 106/73  Patient Blood pressure goal: <150/90  Patient blood pressure at goal:  yes  Hospitalizations/ER visits since last assessment: 1, abdominal pain      Med rec/DUR performed: yes  Med Rec Discrepancies: yes, Cell Cept was changed to Myfortic and omeprazole was reported as 40mg BID instead of 20mg BID    I spoke to Rosio today over the phone. She reports Mary is doing well although he is  still in some pain due to a \"pocket of fluid\" in his abdomen that is slowly draining. She has been giving him acetaminophen TID regularly to help manage the pain. The only side effect she reported was some diarrhea. During the med rec, she reported that he was taking Cellcept 750mg BID but just today it was changed to Myfortic 540mg BID. The Rx was sent to [facility] but she noticed she received an out of stock notice from them so she requested we fill it instead. She fills up his medication box regularly, references the medication card, and Mary uses phone reminders to remember to take his medications.    Rosio says she doesn't really check blood pressure at home much. He was started on fludrocortisone to help prevent low blood pressure so she's not concerned about it going too high, and she is more concerned about his blood glucose levels.    She reports that his blood glucose is dropping down in the 40's and has been holding insulin at times to manage it. She says his BG has been rising a little as of late since he has been able to eat more after starting mirtazapine.    No additional questions or concerns.    Time spent: 35 minutes    Follow up: 1 month    Darell Solis, PharmD  [phone]

## 2024-04-11 NOTE — TELEPHONE ENCOUNTER
Post Liver Transplant Team Conference  Date: 4/11/2024  Transplant Coordinator: Lore Mckeon  Transplant Surgeon: Ryder Cortez      Attendees:  [] Dr. Garcia [x] Zoe Mckenzie, RN []       [x] Dr. Cortez [x] Valentina Enamorado LPN []    [] Dr. Sharp [x] Evelyn Don, BRIDGET []    [] Dr. Goldman [x] Vicki Mccord, PIYUSH []    [] DR. Cash [] Vicki Bruno, BRIDGET []       [] Dr. Bailey [x] Judy Galeana RN []       [] Dr. Flavio Childs [] Lore Mckeon, BRIDGET []       [] Dr. KRISTIN Barrett [x] Isabella Mortensen RN []       [x] Fredy Osborn, pharm [] Dora Herrera RN []       [x] Ying Bowling NP [] Amadeo Martinez RN []         Verbal Plan Read Back:   Continue Prednisone (total of 3 months).   Discontinue Mycophenolate. Change to Myfortic 540mg BID.   Stop Sodium Bicarb.   Reassess the need to increase Remeron to 30mg on Monday.    Routed to RN Coordinator   Lore Mckeon, RN

## 2024-04-11 NOTE — PROGRESS NOTES
"Mary is a 53 year old who is being evaluated via a billable video visit.    How would you like to obtain your AVS? [employer]  If the video visit is dropped, the invitation should be resent by: Text to cell phone: [phone]  Will anyone else be joining your video visit? No          Instructions Relayed to Patient by Virtual Roomer:         Reminded patient to ensure they were logged on to virtual visit by arrival time listed. Documented in appointment notes if patient had flexibility to initiate visit sooner than arrival time. If pediatric virtual visit, ensured pediatric patient along with parent/guardian will be present for video visit.     Patient offered the website [email or web address] and/or phone number to [employer] Help line: [phone]     Assessment & Plan     1.  Liver replaced by transplant on 3/5/2024  2.  Subhepatic hematoma proven by ultrasound on 3/30/2024.  3.  ANGEL improved to creatinine elevated 1.44 estimated GFR 58 on 4/11/2024.  4.  Type 2 diabetes mellitus currently on Lantus 18 units and NovoLog 5 units with breakfast and lunch about 3 units with supper.  Still gets nighttime hypoglycemia.  Being managed by endocrinology.  5.  Anemia most recent hemoglobin 10.4 g/dL multifactorial.  6.  Hyperkalemia potassium 5.4 on 4/11/2024.  7.  Immunosuppressed.  Currently on low-dose prednisone, tacrolimus and mycophenolate acid.      MED REC REQUIRED  Post Medication Reconciliation Status:   BMI  Estimated body mass index is 30.33 kg/m  as calculated from the following:    Height as of 3/31/24: 1.727 m (5' 8\").    Weight as of 4/8/24: 90.5 kg (199 lb 8 oz).       Subjective   Mary is a 53 year old, presenting for the following health issues:  Follow Up and Surgical Followup      4/11/2024     2:59 PM   Additional Questions   Roomed by Diane         4/11/2024     2:59 PM   Patient Reported Additional Medications   Patient reports taking the following new medications none     HPI "       Hospital Follow-up Visit:    Hospital/Nursing Home/IP Rehab Facility: [facility]  Date of Admission: 3/4/2024  Date of Discharge: 3/21/2024  Reason(s) for Admission: Liver replaced by transplant   Was the patient in the ICU or did the patient experience delirium during hospitalization?  Yes        No data to display              Do you have any other stressors you would like to discuss with your provider? No    Problems taking medications regularly:  None  Medication changes since discharge: Yes, but they are already documented.   Problems adhering to non-medication therapy:  None    Summary of hospitalization:  [facility] hospital discharge summary reviewed  Diagnostic Tests/Treatments reviewed.  Follow up needed: none  Other Healthcare Providers Involved in Patient s Care:         Specialist appointment - trnasplant  Update since discharge: improved.         Plan of care communicated with patient         Patient would also like to discuss a referral to dermatology for removal of the cyst on right wrist.     Patient was hospitalized for liver transplant which she underwent on 3/5/2024.  Subsequently was readmitted with abdominal pain and found to have hematoma beneath the right liver lobe.  Video visit was conducted with his wife present.  He indicates he is clinically doing well though he does not have a good appetite.  His blood sugars do tend to run low at night.  Working with endocrinology and insulin dose is being readjusted.      Review of Systems  Constitutional, HEENT, cardiovascular, pulmonary, GI, , musculoskeletal, neuro, skin, endocrine and psych systems are negative, except as otherwise noted.      Objective           Vitals:  No vitals were obtained today due to virtual visit.    Physical Exam   GENERAL: alert and no distress  EYES: Eyes grossly normal to inspection.  No discharge or erythema, or obvious scleral/conjunctival  abnormalities.  RESP: No audible wheeze, cough, or visible cyanosis.    SKIN: Visible skin clear. No significant rash, abnormal pigmentation or lesions.  NEURO: Cranial nerves grossly intact.  Mentation and speech appropriate for age.  PSYCH: Appropriate affect, tone, and pace of words          Video-Visit Details    Type of service:  Video Visit   Originating Location (pt. Location): Home    Distant Location (provider location):  On-site  Platform used for Video Visit: [employer]  Signed Electronically by: Jimmie Hoyt MD

## 2024-04-12 ENCOUNTER — TELEPHONE (OUTPATIENT)
Dept: FAMILY MEDICINE | Facility: CLINIC | Age: 54
End: 2024-04-12
Payer: COMMERCIAL

## 2024-04-12 DIAGNOSIS — Z94.4 LIVER REPLACED BY TRANSPLANT (H): Chronic | ICD-10-CM

## 2024-04-12 NOTE — TELEPHONE ENCOUNTER
Forms/Letter Request    Type of form/letter: [facility]     Do we have the form/letter: Yes:     Who is the form from? Home care    Where did/will the form come from? form was faxed in    When is form/letter needed by: asap    How would you like the form/letter returned: Fax : [phone]

## 2024-04-13 PROBLEM — A41.9: Status: RESOLVED | Noted: 2024-02-23 | Resolved: 2024-04-13

## 2024-04-13 PROBLEM — K72.00: Status: RESOLVED | Noted: 2024-02-23 | Resolved: 2024-04-13

## 2024-04-13 PROBLEM — R65.21: Status: RESOLVED | Noted: 2024-02-23 | Resolved: 2024-04-13

## 2024-04-15 ENCOUNTER — LAB (OUTPATIENT)
Dept: LAB | Facility: CLINIC | Age: 54
End: 2024-04-15
Attending: TRANSPLANT SURGERY
Payer: COMMERCIAL

## 2024-04-15 ENCOUNTER — OFFICE VISIT (OUTPATIENT)
Dept: TRANSPLANT | Facility: CLINIC | Age: 54
End: 2024-04-15
Attending: TRANSPLANT SURGERY
Payer: COMMERCIAL

## 2024-04-15 ENCOUNTER — MEDICAL CORRESPONDENCE (OUTPATIENT)
Dept: HEALTH INFORMATION MANAGEMENT | Facility: CLINIC | Age: 54
End: 2024-04-15

## 2024-04-15 ENCOUNTER — TELEPHONE (OUTPATIENT)
Dept: TRANSPLANT | Facility: CLINIC | Age: 54
End: 2024-04-15

## 2024-04-15 VITALS
WEIGHT: 190.9 LBS | OXYGEN SATURATION: 100 % | SYSTOLIC BLOOD PRESSURE: 99 MMHG | DIASTOLIC BLOOD PRESSURE: 69 MMHG | BODY MASS INDEX: 29.03 KG/M2 | RESPIRATION RATE: 16 BRPM | HEART RATE: 77 BPM

## 2024-04-15 DIAGNOSIS — Z94.4 LIVER REPLACED BY TRANSPLANT (H): ICD-10-CM

## 2024-04-15 DIAGNOSIS — E87.5 HYPERKALEMIA: Primary | ICD-10-CM

## 2024-04-15 DIAGNOSIS — Z94.4 LIVER REPLACED BY TRANSPLANT (H): Chronic | ICD-10-CM

## 2024-04-15 DIAGNOSIS — Z53.9 DIAGNOSIS NOT YET DEFINED: Primary | ICD-10-CM

## 2024-04-15 LAB
ALBUMIN SERPL BCG-MCNC: 3.2 G/DL (ref 3.5–5.2)
ALP SERPL-CCNC: 147 U/L (ref 40–150)
ALT SERPL W P-5'-P-CCNC: <5 U/L (ref 0–70)
ANION GAP SERPL CALCULATED.3IONS-SCNC: 7 MMOL/L (ref 7–15)
AST SERPL W P-5'-P-CCNC: 20 U/L (ref 0–45)
BILIRUB DIRECT SERPL-MCNC: 0.43 MG/DL (ref 0–0.3)
BILIRUB SERPL-MCNC: 0.7 MG/DL
BUN SERPL-MCNC: 15.5 MG/DL (ref 6–20)
CALCIUM SERPL-MCNC: 9.4 MG/DL (ref 8.6–10)
CHLORIDE SERPL-SCNC: 102 MMOL/L (ref 98–107)
CREAT SERPL-MCNC: 1.24 MG/DL (ref 0.67–1.17)
DEPRECATED HCO3 PLAS-SCNC: 26 MMOL/L (ref 22–29)
EGFRCR SERPLBLD CKD-EPI 2021: 70 ML/MIN/1.73M2
ERYTHROCYTE [DISTWIDTH] IN BLOOD BY AUTOMATED COUNT: 15.7 % (ref 10–15)
GLUCOSE SERPL-MCNC: 235 MG/DL (ref 70–99)
HCT VFR BLD AUTO: 32.6 % (ref 40–53)
HGB BLD-MCNC: 10.7 G/DL (ref 13.3–17.7)
MAGNESIUM SERPL-MCNC: 1.5 MG/DL (ref 1.7–2.3)
MCH RBC QN AUTO: 30.7 PG (ref 26.5–33)
MCHC RBC AUTO-ENTMCNC: 32.8 G/DL (ref 31.5–36.5)
MCV RBC AUTO: 93 FL (ref 78–100)
PHOSPHATE SERPL-MCNC: 4.3 MG/DL (ref 2.5–4.5)
PLATELET # BLD AUTO: 257 10E3/UL (ref 150–450)
POTASSIUM SERPL-SCNC: 5.8 MMOL/L (ref 3.4–5.3)
PROT SERPL-MCNC: 6.4 G/DL (ref 6.4–8.3)
RBC # BLD AUTO: 3.49 10E6/UL (ref 4.4–5.9)
SODIUM SERPL-SCNC: 135 MMOL/L (ref 135–145)
TACROLIMUS BLD-MCNC: 6.7 UG/L (ref 5–15)
TME LAST DOSE: NORMAL H
TME LAST DOSE: NORMAL H
WBC # BLD AUTO: 4.3 10E3/UL (ref 4–11)

## 2024-04-15 PROCEDURE — 85027 COMPLETE CBC AUTOMATED: CPT | Performed by: PATHOLOGY

## 2024-04-15 PROCEDURE — 99213 OFFICE O/P EST LOW 20 MIN: CPT | Mod: 24 | Performed by: TRANSPLANT SURGERY

## 2024-04-15 PROCEDURE — 99000 SPECIMEN HANDLING OFFICE-LAB: CPT | Performed by: PATHOLOGY

## 2024-04-15 PROCEDURE — 36415 COLL VENOUS BLD VENIPUNCTURE: CPT | Performed by: PATHOLOGY

## 2024-04-15 PROCEDURE — 80053 COMPREHEN METABOLIC PANEL: CPT | Performed by: PATHOLOGY

## 2024-04-15 PROCEDURE — 80197 ASSAY OF TACROLIMUS: CPT | Performed by: TRANSPLANT SURGERY

## 2024-04-15 PROCEDURE — 82248 BILIRUBIN DIRECT: CPT | Performed by: PATHOLOGY

## 2024-04-15 PROCEDURE — 99213 OFFICE O/P EST LOW 20 MIN: CPT | Performed by: TRANSPLANT SURGERY

## 2024-04-15 PROCEDURE — G0180 MD CERTIFICATION HHA PATIENT: HCPCS | Performed by: INTERNAL MEDICINE

## 2024-04-15 PROCEDURE — 84100 ASSAY OF PHOSPHORUS: CPT | Performed by: PATHOLOGY

## 2024-04-15 PROCEDURE — 83735 ASSAY OF MAGNESIUM: CPT | Performed by: PATHOLOGY

## 2024-04-15 NOTE — NURSING NOTE
Chief Complaint   Patient presents with    Follow Up     Liver transplant       BP 99/69 (BP Location: Right arm, Patient Position: Sitting, Cuff Size: Adult Large)   Pulse 77   Resp 16   Wt 86.6 kg (190 lb 14.4 oz)   SpO2 100%   BMI 29.03 kg/m      CHUCK ORTEGA, RN on 4/15/2024 at 9:23 AM

## 2024-04-15 NOTE — TELEPHONE ENCOUNTER
[facility] forms completed and signed will be faxed to [phone]  Order # [id number]      Divine STREETER Visit Facilitator

## 2024-04-15 NOTE — LETTER
4/15/2024         RE: Mary Anayarowan Lopez  [address]  [place] [address]        Dear Colleague,    Thank you for referring your patient, Mary Lopez, to the [facility]. Please see a copy of my visit note below.    Transplant Surgery -OUTPATIENT IMMUNOSUPPRESSION PROGRESS NOTE    Date of Visit: 04/15/2024    Transplants:  3/5/2024 (Liver); Postoperative day:  41  ASSESMENT AND PLAN:  1.Graft Function:Liver allograft: no rejection or technical problems.    2.Immunosuppression Management:keep tacrolimus levels at 10  3.Hypertension:okay  4.Renal Function:better  5.Lab frequency: twice weekly  6.Other:  Increase remeron to 30 mg po daily  Deconditioning outpatient physical therapy        Date: April 15, 2024    Transplant:  [x]                             Liver [x]                              Kidney []                             Pancreas []                              Other:             Chief Complaint:  Wound healthy     History of Present Illness:  Patient Active Problem List   Diagnosis     Primary hypertension     Tobacco abuse disorder     Mild hyperlipidemia     Morbid obesity (H)     Portal hypertensive gastropathy (H)     Secondary esophageal 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 (H)     Anemia due to unknown mechanism     Persistent insomnia     Bilateral leg edema     Hyponatremia     Alcohol use disorder, severe, in early remission (H)     Gastric varices     Severe malnutrition (H24)     Type 2 diabetes mellitus without complication, with long-term current use of insulin (H)     Generalized muscle weakness     Pulmonary HTN (H)     Hepatic encephalopathy (H)     ANGEL (acute kidney injury) (H24)     Acute kidney failure, unspecified (H)     Delirium     Liver replaced by transplant (H)     Immunosuppressed status (H24)     Liver transplant rejection (H)     Acute post-operative pain     Atrial fibrillation with RVR (H)     Pleural effusion     Anemia due to blood loss,  acute     Hypomagnesemia     Hypervolemia     Unspecified abdominal pain     Hyperkalemia     SOCIAL /FAMILY HISTORY: [x]                  No recent change    Past Medical History:   Diagnosis Date     ANGEL (acute kidney injury) (H24)      Alcoholic cirrhosis of liver without ascites (H) 2023     Alcoholic hepatitis with ascites (H28) 10/03/2023     Alcoholic hepatitis without ascites (H28) 2023     Closed fracture of one rib of left side 2023     Concussion without loss of consciousness 2020     Decompensated hepatic cirrhosis (H) 09/15/2023     Diabetes mellitus, type 2 (H) 2023     Essential hypertension 2020     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)      Liver replaced by transplant (H) 2024     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    ACR PRAJAPATI -, treated with steroids     Mild hyperlipidemia 2021     Persistent insomnia 2023     Portal hypertension (H) 2023     Scrotal abscess      Secondary esophageal 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 (H) 2023     Tobacco abuse disorder 2020     Type 2 diabetes mellitus with hyperglycemia (H) 2023     Past Surgical History:   Procedure Laterality Date     BENCH LIVER  3/5/2024    Procedure: Bench liver;  Surgeon: Ryder Cortez MD;  Location: [facility] OR     CHOLECYSTECTOMY       COLONOSCOPY N/A 2024    Procedure: COLONOSCOPY, WITH POLYPECTOMY;  Surgeon: Jak Urbina MD;  Location: [facility]     CV RIGHT HEART CATH MEASUREMENTS RECORDED N/A 2024    Procedure: Right Heart Catheterization;  Surgeon: Alfred Tafoya MD;  Location:  HEART CARDIAC CATH LAB     IR LIVER BIOPSY PERCUTANEOUS  3/15/2024     IR RETROPERITONEAL ABSCESS DRAINAGE  2024     TONSILLECTOMY       TRANSPLANT LIVER RECIPIENT  DONOR N/A 3/5/2024    Procedure: Transplant liver recipient  donor, bile duct stent placement;  Surgeon: Ryder Cortez MD;  Location: [facility] OR     VASECTOMY        Social History     Socioeconomic History     Marital status:      Spouse name: Not on file     Number of children: Not on file     Years of education: Not on file     Highest education level: Not on file   Occupational History     Occupation: Not working now   Tobacco Use     Smoking status: Former     Types: Cigarettes     Passive exposure: Never     Smokeless tobacco: Current     Types: Chew     Last attempt to quit: 1/1/2004     Tobacco comments:     Chew daily 1/8 of a tin per day   Vaping Use     Vaping status: Never Used   Substance and Sexual Activity     Alcohol use: Not Currently     Alcohol/week: 12.0 standard drinks of alcohol     Types: 12 Standard drinks or equivalent per week     Comment: Sober since 6/25/2023     Drug use: Not Currently     Sexual activity: Yes     Partners: Female     Birth control/protection: Male Surgical   Other Topics Concern     Parent/sibling w/ CABG, MI or angioplasty before 65F 55M? No   Social History Narrative     Not on file     Social Determinants of Health     Financial Resource Strain: Low Risk  (12/22/2023)    Financial Resource Strain      Within the past 12 months, have you or your family members you live with been unable to get utilities (heat, electricity) when it was really needed?: No   Food Insecurity: No Food Insecurity (1/11/2024)    Received from [facility]     Hunger Vital Sign      Worried About Running Out of Food in the Last Year: Never true      Ran Out of Food in the Last Year: Never true   Transportation Needs: No Transportation Needs (1/11/2024)    Received from [facility]     PRAPARE - Transportation      Lack of Transportation (Medical): No      Lack of Transportation (Non-Medical): No   Physical Activity: Not on file   Stress: Not on file   Social Connections: Not on file   Interpersonal Safety: Low Risk  (12/22/2023)    Interpersonal Safety      Do you feel  physically and emotionally safe where you currently live?: Yes      Within the past 12 months, have you been hit, slapped, kicked or otherwise physically hurt by someone?: No      Within the past 12 months, have you been humiliated or emotionally abused in other ways by your partner or ex-partner?: No   Housing Stability: Low Risk  (2024)    [facility]     Housing Stability Vital Sign      Unable to Pay for Housing in the Last Year: No      Number of Places Lived in the Last Year: 1      In the last 12 months, was there a time when you did not have a steady place to sleep or slept in a shelter (including now)?: No     Prescription Medications as of 4/15/2024         Rx Number Disp Refills Start End Last Dispensed Date Next Fill Date Owning Pharmacy    acetaminophen (TYLENOL) 500 MG tablet  30 tablet 0 3/21/2024 --   [facility] [address]     Sig: Take 1-2 tablets (500-1,000 mg) by mouth every 6 hours as needed for mild pain    Class: E-Prescribe    Route: Oral    aspirin (ASA) 325 MG tablet  30 tablet 5 3/22/2024 --   [place], [place] - [address]     Si tablet (325 mg) by Oral or Feeding Tube route daily    Class: E-Prescribe    Route: Oral or Feeding Tube    blood glucose (NO BRAND SPECIFIED) test strip  100 strip 6 10/24/2023 --   [facility] #[id number] - [place], [place] - [address]    Sig: Use to test blood sugar two times daily or as directed. To accompany: Blood Glucose Monitor Brands: per insurance.    Class: E-Prescribe    blood glucose monitoring (NO BRAND SPECIFIED) meter device kit  1 kit 0 10/24/2023 --   [facility] #[id number] - [place], [place] - [address]    Sig: Use to test blood sugar twice times daily or as directed. Preferred blood glucose meter OR supplies to accompany: Blood Glucose  Monitor Brands: per insurance.    Class: E-Prescribe    Continuous Blood Gluc Sensor (DEXCOM G7 SENSOR) MISC  3 each 5 3/21/2024 --   [address]    Sig: Change every 10 days.    Class: E-Prescribe    Continuous Blood Gluc Sensor (DEXCOM G7 SENSOR) MISC  3 each 5 11/16/2023 --   [id number] [address] [facility]    Sig: Change every 10 days.    Class: E-Prescribe    fludrocortisone (FLORINEF) 0.1 MG tablet  30 tablet 0 4/8/2024 --   [id number] - [place] [address] [facility]    Sig: Take 1 tablet (0.1 mg) by mouth daily    Class: E-Prescribe    Route: Oral    Glucagon (GVOKE HYPOPEN) 1 MG/0.2ML pen  0.4 mL 1 3/21/2024 --   [address]    Sig: Inject the contents of 1 device under the skin into lower abdomen, outer thigh, or outer upper arm as needed for hypoglycemia. If no response after 15 minutes, additional 1 mg dose from a new device may be injected while waiting for emergency assistance.    Class: E-Prescribe    Notes to Pharmacy: Replaces [id number] per insurance formulary    hydrOXYzine HCl (ATARAX) 25 MG tablet  20 tablet 0 4/2/2024 --   [address]    Sig: Take 1 tablet (25 mg) by mouth every 6 hours as needed for other (adjuvant pain)    Class: E-Prescribe    Route: Oral    insulin aspart (NOVOLOG PEN) 100 UNIT/ML pen  15 mL 1 3/21/2024 --   [address]    Sig: Inject 1-10 Units Subcutaneous 3 times daily (before meals) Humalog 4 units with meals plus correction scale 1:50 >140 TID AC and 1:50 >200 HS Pre-meal Humalog correction scale: Do Not give Correction Insulin if Pre-Meal BG less than 140. For Pre-Meal  - 189 give 1 unit. For Pre-Meal  - 239 give 2 units. For  Pre-Meal  - 289 give 3 units. For Pre-Meal  - 339 give 4 units. For Pre-Meal - 399 give 5 units. For Pre-Meal -449 give 6 units For Pre-Meal BG greater than or equal to 450 give 7 units. To be given with prandial insulin, and based on pre-meal blood glucose. Bedtime Humalog correction scale: Do Not give Bedtime Correction Insulin if BG less than 200. For  - 249 give 1 units. For  - 299 give 2 units. For  - 349 give 3 units. For  -399 give 4 units. For BG greater than or equal to 400 give 5 units. Notify provider if glucose greater than or equal to 350 mg/dL after administration of correction dose.    Class: E-Prescribe    Route: Subcutaneous    Insulin Glargine-yfgn (SEMGLEE, YFGN,) 100 UNIT/ML SOLN  -- -- 4/2/2024 --       Sig: Inject 5 Units Subcutaneous daily    Class: No Print Out    Route: Subcutaneous    insulin pen needle (32G X 4 MM) 32G X 4 MM miscellaneous  200 each 1 3/21/2024 --   [place], [place] - [address] [place] [address]    Sig: Use as directed by provider Per insurance coverage    Class: E-Prescribe    insulin pen needle (BD PEN NEEDLE SHERRIE 2ND GEN) 32G X 4 MM miscellaneous  100 each 11 1/8/2024 --   [id number] [place], [place] - [address] [facility]    Sig: USES 5 PER DAY    Class: E-Prescribe    magnesium oxide (MAG-OX) 400 MG tablet  120 tablet 11 4/2/2024 --   [address]    Sig: Take 2 tablets (800 mg) by mouth 2 times daily    Class: E-Prescribe    Route: Oral    melatonin 10 MG TABS tablet  -- -- 8/3/2023 --       Sig: Take 1 tablet (10 mg) by mouth nightly as needed for sleep    Class: OTC    Route: Oral    mirtazapine (REMERON) 15 MG tablet  30 tablet 0 4/10/2024 --   [id number] [place], [place] - [address]    Sig: Take 1 tablet (15 mg) by mouth at bedtime    Class:  E-Prescribe    Route: Oral    multivitamin w/minerals (THERA-VIT-M) tablet  90 tablet 1 2024 --   [id number] - [place], [place] - [address] [facility]    Sig: TAKE 1 TABLET BY MOUTH DAILY    Class: E-Prescribe    Route: Oral    Renewals       Renewal provider: Jimmie Hoyt MD            mycophenolic acid (GENERIC EQUIVALENT) 180 MG EC tablet  540 tablet 0 2024 --   [id number] - [place], [place] - [address] [facility]    Sig: Take 3 tablets (540 mg) by mouth 2 times daily    Class: E-Prescribe    Notes to Pharmacy: TXP DT 3/5/2024 (Liver) TXP Dischg DT 3/21/2024 DX Liver replaced by transplant Z94.4 TX [facility] ([place], [place])    Route: Oral    omeprazole (PRILOSEC) 20 MG DR capsule  180 capsule 1 8/3/2023 --       Sig: Take 1 capsule (20 mg) by mouth 2 times daily    Class: Historical    Route: Oral    polyethylene glycol (MIRALAX) 17 GM/Dose powder  510 g 1 3/21/2024 --   [facility] [place], [place] - [address] [place] [address]    Sig: Take 17 g by mouth daily    Class: E-Prescribe    Route: Oral    predniSONE (DELTASONE) 5 MG tablet  90 tablet 3 3/28/2024 --   [id number] - [place], [place] - [address] [facility]    Sig: Take 1 tablet (5 mg) by mouth daily    Class: E-Prescribe    Notes to Pharmacy: TXP DT 3/5/2024 (Liver) TXP Dischg DT 3/21/2024 DX Liver replaced by transplant Z94.4 TX [facility] ([place], [place])    Route: Oral    senna-docusate (SENOKOT-S/PERICOLACE) 8.6-50 MG tablet  60 tablet 1 3/21/2024 --   [facility] [place], [place] - [address] [place] [address]    Si-2 tablets by Oral or Feeding Tube route 2 times daily as needed for constipation    Class: E-Prescribe    Route: Oral or Feeding Tube    sulfamethoxazole-trimethoprim (BACTRIM) 400-80 MG tablet  30 tablet  5 3/21/2024 --   [facility] - [place], [place] - [address] [place] [address]    Sig: Take 1 tablet by mouth every evening    Class: E-Prescribe    Route: Oral    tacrolimus (GENERIC EQUIVALENT) 1 MG capsule  180 capsule 3 4/3/2024 --   [facility][id number] - [place], [place] - [address] [facility]    Sig: Take 1 capsule (1 mg) by mouth every 12 hours Total dos: 6mg BID    Class: E-Prescribe    Notes to Pharmacy: TXP DT 3/5/2024 (Liver) TXP Dischg DT 3/21/2024 DX Liver replaced by transplant Z94.4 TX [facility] ([place], [place])    Route: Oral    tacrolimus (GENERIC EQUIVALENT) 5 MG capsule  180 capsule 3 4/3/2024 --   [facility] #[id number] - [place], [place] - [address] [facility]    Sig: Take 1 capsule (5 mg) by mouth every 12 hours Total dose: 6mg BID    Class: E-Prescribe    Notes to Pharmacy: TXP DT 3/5/2024 (Liver) TXP Dischg DT 3/21/2024 DX Liver replaced by transplant Z94.4 TX [facility] ([place], [place])    Route: Oral    thin (NO BRAND SPECIFIED) lancets  100 each 6 10/24/2023 --   [email or web address] #[id number] - [place], [place] - [address] [facility]    Sig: Use with lanceting device. To accompany: Blood Glucose Monitor Brands: per insurance.    Class: E-Prescribe    traMADol (ULTRAM) 50 MG tablet  20 tablet 0 4/8/2024 4/15/2024       Sig: Take 1 tablet (50 mg) by mouth every 6 hours as needed for severe pain    Class: Local Print    Route: Oral    ursodiol (ACTIGALL) 300 MG capsule  60 capsule 5 3/21/2024 --   [address]    Sig: Take 1 capsule (300 mg) by mouth 2 times daily    Class: E-Prescribe    Route: Oral    valGANciclovir (VALCYTE) 450 MG tablet  30 tablet 2 3/21/2024 --   [address]    Sig: Take 1 tablet (450 mg) by  mouth every evening    Class: E-Prescribe    Route: Oral          Other food allergy and Pineapple   REVIEW OF SYSTEMS (check box if normal)  [x]               GENERAL  [x]                 PULMONARY [x]                GENITOURINARY  [x]                CNS                 [x]                 CARDIAC  [x]                 ENDOCRINE  [x]                EARS,NOSE,THROAT [x]                 GASTROINTESTINAL [x]                 NEUROLOGIC    [x]                MUSCLOSKELTAL  [x]                  HEMATOLOGY      PHYSICAL EXAM (check box if normal)BP 99/69 (BP Location: Right arm, Patient Position: Sitting, Cuff Size: Adult Large)   Pulse 77   Resp 16   Wt 86.6 kg (190 lb 14.4 oz)   SpO2 100%   BMI 29.03 kg/m          [x]            GENERAL:    [x]       EYES:  ICTERIC   []        YES  []                    NO  [x]           EXTREMITIES: Clubbing []       Y     [x]           N    [x]           EARS, NOSE, THROAT: Membranes Moist    YES   [x]                   NO []                  [x]           LUNGS:  CLEAR    YES       [x]                  NO    []                                [x]           SKIN: Jaundice           YES       []                  NO    [x]                   Rash: YES       []                  NO    [x]                                     [x]             HEART: Regular Rate          YES       [x]                  NO    []                   Incision Clean:  YES       [x]                  NO    []                                [x]                    ABDOMEN: Wound healthy Organomegaly YES       []                  NO    [x]                       [x]                    NEUROLOGICAL:  Nonfocal  YES       [x]                  NO    []                       [x]                    Hernia YES       []                  NO    [x]                   PSYCHIATRIC:  Appropriate  YES       [x]                  NO    []                       OTHER:                                                                                                    PAIN SCALE:: 3       Again, thank you for allowing me to participate in the care of your patient.        Sincerely,        Ryder Cortez MD

## 2024-04-15 NOTE — TELEPHONE ENCOUNTER
[facility] forms completed and signed will be faxed to [phone]  Order # [id number]      Divine Small Facilitator

## 2024-04-15 NOTE — TELEPHONE ENCOUNTER
ISSUE:   Tacrolimus IR level 6.7 on 4/15, goal 10, dose 6 mg BID.    PLAN:   Call Patient and confirm this was an accurate 12-hour trough.   Verify Tacrolimus IR dose.   Confirm no new medications or or missed doses.   Confirm no new illness / infection / diarrhea.   If accurate trough and accurate dose, increase Tacrolimus IR dose to 7 mg BID       Repeat labs in on Thursday please.    OUTCOME:   Spoke with Rosio, they confirm accurate trough level and current dose 6 mg BID.   Rosio confirmed dose change to 7 mg BID.  [facility]io agreed to repeat labs on Thursday.   Orders sent to [facility] pharmacy for dose change and lab for repeat labs.   [facility] voiced understanding of plan.    Pt drank a boost supplement this morning before his lab draw for a K+ check. INÉS.     Valentina Enamorado LPN

## 2024-04-15 NOTE — PROGRESS NOTES
Transplant Surgery -OUTPATIENT IMMUNOSUPPRESSION PROGRESS NOTE    Date of Visit: 04/15/2024    Transplants:  3/5/2024 (Liver); Postoperative day:  41  ASSESMENT AND PLAN:  1.Graft Function:Liver allograft: no rejection or technical problems.    2.Immunosuppression Management:keep tacrolimus levels at 10  3.Hypertension:okay  4.Renal Function:better  5.Lab frequency: twice weekly  6.Other:  Increase remeron to 30 mg po daily  Deconditioning outpatient physical therapy        Date: April 15, 2024    Transplant:  [x]                             Liver [x]                              Kidney []                             Pancreas []                              Other:             Chief Complaint:  Wound healthy     History of Present Illness:  Patient Active Problem List   Diagnosis    Primary hypertension    Tobacco abuse disorder    Mild hyperlipidemia    Morbid obesity (H)    Portal hypertensive gastropathy (H)    Secondary esophageal 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 (H)    Anemia due to unknown mechanism    Persistent insomnia    Bilateral leg edema    Hyponatremia    Alcohol use disorder, severe, in early remission (H)    Gastric varices    Severe malnutrition (H24)    Type 2 diabetes mellitus without complication, with long-term current use of insulin (H)    Generalized muscle weakness    Pulmonary HTN (H)    Hepatic encephalopathy (H)    ANGEL (acute kidney injury) (H24)    Acute kidney failure, unspecified (H)    Delirium    Liver replaced by transplant (H)    Immunosuppressed status (H24)    Liver transplant rejection (H)    Acute post-operative pain    Atrial fibrillation with RVR (H)    Pleural effusion    Anemia due to blood loss, acute    Hypomagnesemia    Hypervolemia    Unspecified abdominal pain    Hyperkalemia     SOCIAL /FAMILY HISTORY: [x]                  No recent change    Past Medical History:   Diagnosis Date    ANGEL (acute kidney injury) (H24)     Alcoholic cirrhosis of liver without ascites (H) 07/13/2023     Alcoholic hepatitis with ascites (H28) 10/03/2023    Alcoholic hepatitis without ascites (H28) 2023    Closed fracture of one rib of left side 2023    Concussion without loss of consciousness 2020    Decompensated hepatic cirrhosis (H) 09/15/2023    Diabetes mellitus, type 2 (H) 2023    Essential hypertension 2020    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)     Liver replaced by transplant (H) 2024    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    ACR PRAJAPATI 6-7, treated with steroids    Mild hyperlipidemia 2021    Persistent insomnia 2023    Portal hypertension (H) 2023    Scrotal abscess     Secondary esophageal 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 (H) 2023    Tobacco abuse disorder 2020    Type 2 diabetes mellitus with hyperglycemia (H) 2023     Past Surgical History:   Procedure Laterality Date    BENCH LIVER  3/5/2024    Procedure: Bench liver;  Surgeon: Ryder Cortez MD;  Location: [facility]    CHOLECYSTECTOMY      COLONOSCOPY N/A 2024    Procedure: COLONOSCOPY, WITH POLYPECTOMY;  Surgeon: Jak Urbina MD;  Location: [facility]    CV RIGHT HEART CATH MEASUREMENTS RECORDED N/A 2024    Procedure: Right Heart Catheterization;  Surgeon: Alfred Tafoya MD;  Location:  HE[facility] CARDIAC CATH LAB    IR LIVER BIOPSY PERCUTANEOUS  3/15/2024    IR RETROPERITONEAL ABSCESS DRAINAGE  2024    TONSILLECTOMY      TRANSPLANT LIVER RECIPIENT  DONOR N/A 3/5/2024    Procedure: Transplant liver recipient  donor, bile duct stent placement;  Surgeon: Ryder Cortez MD;  Location: [facility] OR    VASECTOMY       Social History     Socioeconomic History    Marital status:      Spouse name: Not on file    Number of children: Not on file    Years of education: Not on file    Highest education level: Not on file   Occupational History    Occupation: Not working now   Tobacco Use    Smoking status: Former     Types:  Cigarettes     Passive exposure: Never    Smokeless tobacco: Current     Types: Chew     Last attempt to quit: 1/1/2004    Tobacco comments:     Chew daily 1/8 of a tin per day   Vaping Use    Vaping status: Never Used   Substance and Sexual Activity    Alcohol use: Not Currently     Alcohol/week: 12.0 standard drinks of alcohol     Types: 12 Standard drinks or equivalent per week     Comment: Sober since 6/25/2023    Drug use: Not Currently    Sexual activity: Yes     Partners: Female     Birth control/protection: Male Surgical   Other Topics Concern    Parent/sibling w/ CABG, MI or angioplasty before 65F 55M? No   Social History Narrative    Not on file     Social Determinants of Health     Financial Resource Strain: Low Risk  (12/22/2023)    Financial Resource Strain     Within the past 12 months, have you or your family members you live with been unable to get utilities (heat, electricity) when it was really needed?: No   Food Insecurity: No Food Insecurity (1/11/2024)    Received from [facility]     Hunger Vital Sign     Worried About Running Out of Food in the Last Year: Never true     Ran Out of Food in the Last Year: Never true   Transportation Needs: No Transportation Needs (1/11/2024)    Received from [facility]     PRAPARE - Transportation     Lack of Transportation (Medical): No     Lack of Transportation (Non-Medical): No   Physical Activity: Not on file   Stress: Not on file   Social Connections: Not on file   Interpersonal Safety: Low Risk  (12/22/2023)    Interpersonal Safety     Do you feel physically and emotionally safe where you currently live?: Yes     Within the past 12 months, have you been hit, slapped, kicked or otherwise physically hurt by someone?: No     Within the past 12 months, have you been humiliated or emotionally abused in other ways by your partner or ex-partner?: No   Housing Stability: Low Risk  (1/11/2024)     [facility]     Housing Stability Vital Sign     Unable to Pay for Housing in the Last Year: No     Number of Places Lived in the Last Year: 1     In the last 12 months, was there a time when you did not have a steady place to sleep or slept in a shelter (including now)?: No     Prescription Medications as of 4/15/2024         Rx Number Disp Refills Start End Last Dispensed Date Next Fill Date Owning Pharmacy    acetaminophen (TYLENOL) 500 MG tablet  30 tablet 0 3/21/2024 --   [address]    Sig: Take 1-2 tablets (500-1,000 mg) by mouth every 6 hours as needed for mild pain    Class: E-Prescribe    Route: Oral    aspirin (ASA) 325 MG tablet  30 tablet 5 3/22/2024 --   [address]    Si tablet (325 mg) by Oral or Feeding Tube route daily    Class: E-Prescribe    Route: Oral or Feeding Tube    blood glucose (NO BRAND SPECIFIED) test strip  100 strip 6 10/24/2023 --   [facility][id number] [address]    Sig: Use to test blood sugar two times daily or as directed. To accompany: Blood Glucose Monitor Brands: per insurance.    Class: E-Prescribe    blood glucose monitoring (NO BRAND SPECIFIED) meter device kit  1 kit 0 10/24/2023 --   [facility] #[id number] - [address]    Sig: Use to test blood sugar twice times daily or as directed. Preferred blood glucose meter OR supplies to accompany: Blood Glucose Monitor Brands: per insurance.    Class: E-Prescribe    Continuous Blood Gluc Sensor (DEXCOM G7 SENSOR) MISC  3 each 5 3/21/2024 --   [address]    Sig: Change every 10 days.    Class: E-Prescribe    Continuous Blood Gluc Sensor (DEXCOM G7 SENSOR) MISC  3 each 5 2023 --   [facility][id number] - [place], [place] - [address] [facility]    Sig: Change every 10 days.    Class: E-Prescribe    fludrocortisone (FLORINEF) 0.1 MG tablet  30 tablet 0 4/8/2024 --   [facility][id number] - [place], [place] - [address] [facility]    Sig: Take 1 tablet (0.1 mg) by mouth daily    Class: E-Prescribe    Route: Oral    Glucagon (GVOKE HYPOPEN) 1 MG/0.2ML pen  0.4 mL 1 3/21/2024 --   [address]    Sig: Inject the contents of 1 device under the skin into lower abdomen, outer thigh, or outer upper arm as needed for hypoglycemia. If no response after 15 minutes, additional 1 mg dose from a new device may be injected while waiting for emergency assistance.    Class: E-Prescribe    Notes to Pharmacy: Replaces [id number] per insurance formulary    hydrOXYzine HCl (ATARAX) 25 MG tablet  20 tablet 0 4/2/2024 --   [address]    Sig: Take 1 tablet (25 mg) by mouth every 6 hours as needed for other (adjuvant pain)    Class: E-Prescribe    Route: Oral    insulin aspart (NOVOLOG PEN) 100 UNIT/ML pen  15 mL 1 3/21/2024 --   [address]    Sig: Inject 1-10 Units Subcutaneous 3 times daily (before meals) Humalog 4 units with meals plus correction scale 1:50 >140 TID AC and 1:50 >200 HS Pre-meal Humalog correction scale: Do Not give Correction Insulin if Pre-Meal BG less than 140. For Pre-Meal  - 189 give 1 unit. For Pre-Meal  - 239 give 2 units. For Pre-Meal  - 289 give 3 units. For Pre-Meal  - 339 give 4 units. For Pre-Meal - 399 give 5 units. For Pre-Meal -449 give 6 units For Pre-Meal BG greater than or equal to 450 give 7 units. To be given with prandial insulin, and based on pre-meal blood glucose. Bedtime Humalog correction scale: Do Not give Bedtime Correction Insulin  if BG less than 200. For  - 249 give 1 units. For  - 299 give 2 units. For  - 349 give 3 units. For  -399 give 4 units. For BG greater than or equal to 400 give 5 units. Notify provider if glucose greater than or equal to 350 mg/dL after administration of correction dose.    Class: E-Prescribe    Route: Subcutaneous    Insulin Glargine-yfgn (SEMGLEE, YFGN,) 100 UNIT/ML SOLN  -- -- 4/2/2024 --       Sig: Inject 5 Units Subcutaneous daily    Class: No Print Out    Route: Subcutaneous    insulin pen needle (32G X 4 MM) 32G X 4 MM miscellaneous  200 each 1 3/21/2024 --   [facility] - [place], [place] - [address] [facility]    Sig: Use as directed by provider Per insurance coverage    Class: E-Prescribe    insulin pen needle (BD PEN NEEDLE SHERRIE 2ND GEN) 32G X 4 MM miscellaneous  100 each 11 1/8/2024 --   [id number] [address] [facility]    Sig: USES 5 PER DAY    Class: E-Prescribe    magnesium oxide (MAG-OX) 400 MG tablet  120 tablet 11 4/2/2024 --   [facility] - [place], [place] - [address] [facility]    Sig: Take 2 tablets (800 mg) by mouth 2 times daily    Class: E-Prescribe    Route: Oral    melatonin 10 MG TABS tablet  -- -- 8/3/2023 --       Sig: Take 1 tablet (10 mg) by mouth nightly as needed for sleep    Class: OTC    Route: Oral    mirtazapine (REMERON) 15 MG tablet  30 tablet 0 4/10/2024 --   [facility] #[id number] - [place], [place] - [address] [facility]    Sig: Take 1 tablet (15 mg) by mouth at bedtime    Class: E-Prescribe    Route: Oral    multivitamin w/minerals (THERA-VIT-M) tablet  90 tablet 1 2/2/2024 --   [facility] #[id number] - [place], [place] - [address] [facility]    Sig: TAKE 1 TABLET BY MOUTH DAILY    Class: E-Prescribe    Route: Oral    Renewals       Renewal provider: Jimmie Hoyt MD            mycophenolic acid (GENERIC  EQUIVALENT) 180 MG EC tablet  540 tablet 0 2024 --   [facility] #[id number] - [place], [place] - [address] [facility]    Sig: Take 3 tablets (540 mg) by mouth 2 times daily    Class: E-Prescribe    Notes to Pharmacy: TXP DT 3/5/2024 (Liver) TXP Dischg DT 3/21/2024 DX Liver replaced by transplant Z94.4 TX [facility] ([place], [place])    Route: Oral    omeprazole (PRILOSEC) 20 MG DR capsule  180 capsule 1 8/3/2023 --       Sig: Take 1 capsule (20 mg) by mouth 2 times daily    Class: Historical    Route: Oral    polyethylene glycol (MIRALAX) 17 GM/Dose powder  510 g 1 3/21/2024 --   [address]    Sig: Take 17 g by mouth daily    Class: E-Prescribe    Route: Oral    predniSONE (DELTASONE) 5 MG tablet  90 tablet 3 3/28/2024 --   [facility] #[id number] - [place], [place] - [address] [facility] [facility]    Sig: Take 1 tablet (5 mg) by mouth daily    Class: E-Prescribe    Notes to Pharmacy: TXP DT 3/5/2024 (Liver) TXP Dischg DT 3/21/2024 DX Liver replaced by transplant Z94.4 TX [facility] ([place], [place])    Route: Oral    senna-docusate (SENOKOT-S/PERICOLACE) 8.6-50 MG tablet  60 tablet 1 3/21/2024 --   [place], [place] - [address] [place] [address]    Si-2 tablets by Oral or Feeding Tube route 2 times daily as needed for constipation    Class: E-Prescribe    Route: Oral or Feeding Tube    sulfamethoxazole-trimethoprim (BACTRIM) 400-80 MG tablet  30 tablet 5 3/21/2024 --   [place], [place] - [address] [place] [address]    Sig: Take 1 tablet by mouth every evening    Class: E-Prescribe    Route: Oral    tacrolimus (GENERIC EQUIVALENT) 1 MG capsule  180 capsule 3 4/3/2024 --   [facility] #[id number] - [place], [place] - [address] [facility]    Sig: Take 1  capsule (1 mg) by mouth every 12 hours Total dos: 6mg BID    Class: E-Prescribe    Notes to Pharmacy: TXP DT 3/5/2024 (Liver) TXP Dischg DT 3/21/2024 DX Liver replaced by transplant Z94.4 TX [facility] ([place], [place])    Route: Oral    tacrolimus (GENERIC EQUIVALENT) 5 MG capsule  180 capsule 3 4/3/2024 --   [facility] #[id number] - [place], [place] - [address] [facility]    Sig: Take 1 capsule (5 mg) by mouth every 12 hours Total dose: 6mg BID    Class: E-Prescribe    Notes to Pharmacy: TXP DT 3/5/2024 (Liver) TXP Dischg DT 3/21/2024 DX Liver replaced by transplant Z94.4 TX [facility] ([place], [place])    Route: Oral    thin (NO BRAND SPECIFIED) lancets  100 each 6 10/24/2023 --   [facility] #[id number] - [place], [place] - [address] [facility]    Sig: Use with lanceting device. To accompany: Blood Glucose Monitor Brands: per insurance.    Class: E-Prescribe    traMADol (ULTRAM) 50 MG tablet  20 tablet 0 4/8/2024 4/15/2024       Sig: Take 1 tablet (50 mg) by mouth every 6 hours as needed for severe pain    Class: Local Print    Route: Oral    ursodiol (ACTIGALL) 300 MG capsule  60 capsule 5 3/21/2024 --   [facility] [place], [place] - [address] [facility]    Sig: Take 1 capsule (300 mg) by mouth 2 times daily    Class: E-Prescribe    Route: Oral    valGANciclovir (VALCYTE) 450 MG tablet  30 tablet 2 3/21/2024 --   [facility] [place], [place] - [address] [facility]    Sig: Take 1 tablet (450 mg) by mouth every evening    Class: E-Prescribe    Route: Oral          Other food allergy and Pineapple   REVIEW OF SYSTEMS (check box if normal)  [x]               GENERAL  [x]                 PULMONARY [x]                GENITOURINARY  [x]                CNS                 [x]                 CARDIAC  [x]                 ENDOCRINE  [x]                 EARS,NOSE,THROAT [x]                 GASTROINTESTINAL [x]                 NEUROLOGIC    [x]                MUSCLOSKELTAL  [x]                  HEMATOLOGY      PHYSICAL EXAM (check box if normal)BP 99/69 (BP Location: Right arm, Patient Position: Sitting, Cuff Size: Adult Large)   Pulse 77   Resp 16   Wt 86.6 kg (190 lb 14.4 oz)   SpO2 100%   BMI 29.03 kg/m          [x]            GENERAL:    [x]       EYES:  ICTERIC   []        YES  []                    NO  [x]           EXTREMITIES: Clubbing []       Y     [x]           N    [x]           EARS, NOSE, THROAT: Membranes Moist    YES   [x]                   NO []                  [x]           LUNGS:  CLEAR    YES       [x]                  NO    []                                [x]           SKIN: Jaundice           YES       []                  NO    [x]                   Rash: YES       []                  NO    [x]                                     [x]             HEART: Regular Rate          YES       [x]                  NO    []                   Incision Clean:  YES       [x]                  NO    []                                [x]                    ABDOMEN: Wound healthy Organomegaly YES       []                  NO    [x]                       [x]                    NEUROLOGICAL:  Nonfocal  YES       [x]                  NO    []                       [x]                    Hernia YES       []                  NO    [x]                   PSYCHIATRIC:  Appropriate  YES       [x]                  NO    []                       OTHER:                                                                                                   PAIN SCALE:: 3

## 2024-04-15 NOTE — TELEPHONE ENCOUNTER
Spoke to Adina about the following:    Potassium is high and climbing.    PLease call Mary / Adina - talk w/ them about a low potassium diet, no sports drinks.  Make sure he is taking florinef, will need repeat potassium tomorrow.        Pt did have a sports drink this morning. Pt following low K+ diet. Pt will recheck the K+ level tomorrow.     Valentina Enamorado LPN

## 2024-04-15 NOTE — TELEPHONE ENCOUNTER
Potassium is high and climbing.    PLease call Mayr / Adina - talk w/ them about a low potassium diet, no sports drinks.  Make sure he is taking florinef, will need repeat potassium tomorrow.

## 2024-04-16 ENCOUNTER — LAB (OUTPATIENT)
Dept: LAB | Facility: CLINIC | Age: 54
End: 2024-04-16
Payer: COMMERCIAL

## 2024-04-16 ENCOUNTER — VIRTUAL VISIT (OUTPATIENT)
Dept: EDUCATION SERVICES | Facility: CLINIC | Age: 54
End: 2024-04-16
Payer: COMMERCIAL

## 2024-04-16 ENCOUNTER — VIRTUAL VISIT (OUTPATIENT)
Dept: TRANSPLANT | Facility: CLINIC | Age: 54
End: 2024-04-16
Attending: TRANSPLANT SURGERY
Payer: COMMERCIAL

## 2024-04-16 DIAGNOSIS — Z94.4 LIVER REPLACED BY TRANSPLANT (H): ICD-10-CM

## 2024-04-16 DIAGNOSIS — E11.9 TYPE 2 DIABETES MELLITUS WITHOUT COMPLICATION, WITH LONG-TERM CURRENT USE OF INSULIN (H): Primary | Chronic | ICD-10-CM

## 2024-04-16 DIAGNOSIS — Z79.4 TYPE 2 DIABETES MELLITUS WITHOUT COMPLICATION, WITH LONG-TERM CURRENT USE OF INSULIN (H): Primary | Chronic | ICD-10-CM

## 2024-04-16 DIAGNOSIS — E87.5 HYPERKALEMIA: ICD-10-CM

## 2024-04-16 LAB — POTASSIUM SERPL-SCNC: 5.3 MMOL/L (ref 3.4–5.3)

## 2024-04-16 PROCEDURE — 36415 COLL VENOUS BLD VENIPUNCTURE: CPT

## 2024-04-16 PROCEDURE — 99207 PR NO BILLABLE SERVICE THIS VISIT: CPT | Mod: 95 | Performed by: NUTRITIONIST

## 2024-04-16 PROCEDURE — 84132 ASSAY OF SERUM POTASSIUM: CPT

## 2024-04-16 RX ORDER — TACROLIMUS 5 MG/1
5 CAPSULE ORAL EVERY 12 HOURS
Qty: 180 CAPSULE | Refills: 3 | Status: SHIPPED | OUTPATIENT
Start: 2024-04-16 | End: 2024-04-19

## 2024-04-16 RX ORDER — TACROLIMUS 1 MG/1
2 CAPSULE ORAL EVERY 12 HOURS
Qty: 360 CAPSULE | Refills: 3 | Status: SHIPPED | OUTPATIENT
Start: 2024-04-16 | End: 2024-04-19

## 2024-04-16 NOTE — NURSING NOTE
Is the patient currently in the state of [place]? YES    Visit mode:VIDEO    If the visit is dropped, the patient can be reconnected by: VIDEO VISIT: Text to cell phone:   Telephone Information:   Mobile [phone]       Will anyone else be joining the visit? NO  (If patient encounters technical issues they should call [phone]    How would you like to obtain your AVS? MyChart    Are changes needed to the allergy or medication list? Pt stated no med changes    Are refills needed on medications prescribed by this physician? NO    Reason for visit: JOELLE CHRISTY

## 2024-04-16 NOTE — PROGRESS NOTES
Outcome for 04/16/24 9:20 AM: Data uploaded on Dexcom  Margoth Thompson LPN   Outcome for 04/24/24 9:51 AM: Data obtained via Dexcom website  Margoth Thompson LPN       Patient is showing 4/5 [place] met. Not on statin   Margoth Thompson LPN

## 2024-04-16 NOTE — PROGRESS NOTES
Virtual Visit Details    Type of service:  Video Visit     Originating Location (pt. Location): Home    Distant Location (provider location):  On-site  Platform used for Video Visit: Yenifer    Diabetes Self-Management Education & Support    Maryclement Smith Jessica presents today for education related to Type 2 diabetes    Patient is being treated with:  insulin  He is accompanied by spouse    Year of diagnosis: 2023  Referring provider:  Lai  Patient is followed by Suzanne THOMPSON, now followed by Geovanna THOMPSON  Living Situation: with spouse   Employment: n/a    PATIENT CONCERNS RELATED TO DIABETES SELF MANAGEMENT:       ASSESSMENT:    Taking Medication:     Current Diabetes Management per Patient:  Taking diabetes medications? yes:     Diabetes Medication(s)       Diabetic Other       Glucagon (GVOKE HYPOPEN) 1 MG/0.2ML pen Inject the contents of 1 device under the skin into lower abdomen, outer thigh, or outer upper arm as needed for hypoglycemia. If no response after 15 minutes, additional 1 mg dose from a new device may be injected while waiting for emergency assistance.       Insulin       insulin aspart (NOVOLOG PEN) 100 UNIT/ML pen Inject 1-10 Units Subcutaneous 3 times daily (before meals) Humalog 4 units with meals plus correction scale 1:50 >140 TID AC and 1:50 >200 HS Pre-meal Humalog correction scale: Do Not give Correction Insulin if Pre-Meal BG less than 140. For Pre-Meal  - 189 give 1 unit. For Pre-Meal  - 239 give 2 units. For Pre-Meal  - 289 give 3 units. For Pre-Meal  - 339 give 4 units. For Pre-Meal - 399 give 5 units. For Pre-Meal -449 give 6 units For Pre-Meal BG greater than or equal to 450 give 7 units. To be given with prandial insulin, and based on pre-meal blood glucose. Bedtime Humalog correction scale: Do Not give Bedtime Correction Insulin if BG less than 200. For  - 249 give 1 units. For  - 299 give 2 units. For  - 349 give 3  units. For  -399 give 4 units. For BG greater than or equal to 400 give 5 units. Notify provider if glucose greater than or equal to 350 mg/dL after administration of correction dose.     Insulin Glargine-yfgn (SEMGLEE, YFGN,) 100 UNIT/ML SOLN Inject 5 Units Subcutaneous daily            Monitoring              Taking semglee 18 units  Humalog 3 units with meals   Correction 1/50/140/200    Patient's most recent   Lab Results   Component Value Date    A1C 8.1 10/31/2023        Healthy Eating:   encouraged consistent carb diet    Problem Solving:      Patient is at risk of hypoglycemia?: YES  Hospitalizations for hyper or hypoglycemia: No      EDUCATION and INSTRUCTION PROVIDED AT THIS VISIT:    Reviewed dexcom reports.    Mary has been taking 18 units of long acting insulin. This has helped reduce overnight lows.  He has not been taking novolog as his portions have been much smaller and less easy to predict.  Suggested they give even just one unit of insulin for a small meal. They can work back up to 3 units for his usual meal size and if lower doses don't cause hypoglycemia.    Patient-stated goal written and given to Mary Anayarowan Lopez.  Verbalized and demonstrated understanding of instructions.       FOLLOW-UP:    With Geovanna Ferreira in two weeks  Scheduled early May with me to review pump options in person    Time spent with patient at today's visit was 16 minutes.      Joined the call at 4/16/2024, 7:44:32 am.  Left the call at 4/16/2024, 8:00:40 am.  You were on the call for 16 minutes 7 seconds    Any diabetes medication dose changes were made via the CDE Protocol and Collaborative Practice Agreement with Zaid and  Vishal.  A copy of this encounter was provided to patient's referring provider.

## 2024-04-16 NOTE — PROGRESS NOTES
"Pt evaluated via billable telephone visit. Time spent: 15 min  Provider location: onsite ([facility])     [facility] [place] [facility]  OUTPATIENT MNT: POST LIVER TXP    TIME SPENT: 15 minutes  VISIT TYPE: follow up (saw pt 12/2023 for txp eval)  REFERRING PHYSICIAN: Diego   PT ACCOMPANIED BY: his wife, Adina     Date of txp: liver 3/5/24    NUTRITION ASSESSMENT // DIET RECALL  During 12/2023 visit, pt tells me he was newly diagnosed with DM II. He just had a video visit with CDE today. He reports BG are averaging upper 100s/lower 200s via CGM. A1c 5.6 4/2024.     Mary has been grazing on small amounts of food throughout the day. Today he had 2 eggs with cheese and a muffin + a Boost shake. Has Boost when \"not eating much\". Difficult to get other diet recall info, as a lot of things are not appealing, pt cannot recall what he ate yesterday, etc.     They are also watching potassium in diet. Mary stopped drinking milk d/t hyperK, no juice either. Mostly drinking water. Jose does have a fair amt of K.     Feels he is getting stronger; did test frail x 2 during pre-txp eval     LABS      NUTRITION DIAGNOSIS   Inadequate protein intake related to increased protein needs s/p transplant AEB diet recall shows inconsistent and low protein intake.    NUTRITION INTERVENTION   1. Discussed importance of consuming adequate calories and protein for 2 months post-txp to help heal and reduce muscle/fat wasting. Discussed sources of protein and ways to ensure he is increasing his protein intake.  - Continue at minimum 1-2 protein drinks/day while other oral intake is unreliable. Will send [employer] message with low K options (ie Boost GC).     2. Discussed potassium foods and will send list via [employer].     3. Reviewed importance of food safety and increased risk for food-borne illness post-txp. Also discussed potential need to monitor K+, CHO, and Na+ intakes d/t medication side effects.     4. Avoid the following post " txp d/t risk for rejection, unknown effects on the organs, and/or potential interactions with immunosuppressants:  - Herbal, Chinese, holistic, chiropractic, natural, alternative medicines and supplements  - Detoxes and cleanses  - Weight loss pills  - Protein powders or other products with extracts or herbs (ie green tea extract)    Patient Understanding: Pt verbalized understanding of education provided.  Expected Engagement: Good  Follow-Up Plans: PRN     NUTRITION GOALS   Include 1-2 protein shakes/day + high protein diet as able x 2 months post txp    Mitali Ly, RD, LD, CCTD

## 2024-04-16 NOTE — PROGRESS NOTES
Physical Therapy Discharge Note     Mary Lopez was seen for 1 physical therapy visit and did not return.   Current status is unknown.   This episode for therapy has been closed as of April 16, 2024.

## 2024-04-16 NOTE — LETTER
"    4/16/2024         RE: Mary Lopez  [address] [place] [address]        Dear Colleague,    Thank you for referring your patient, Mary Lopez, to the [facility]. Please see a copy of my visit note below.    Pt evaluated via billable telephone visit. Time spent: 15 min  Provider location: onsite ([facility])     [facility] SOLID ORGAN TRANSPLANT  OUTPATIENT MNT: POST LIVER TXP    TIME SPENT: 15 minutes  VISIT TYPE: follow up (saw pt 12/2023 for txp eval)  REFERRING PHYSICIAN: Diego   PT ACCOMPANIED BY: his wife, Adina     Date of txp: liver 3/5/24    NUTRITION ASSESSMENT // DIET RECALL  During 12/2023 visit, pt tells me he was newly diagnosed with DM II. He just had a video visit with CDE today. He reports BG are averaging upper 100s/lower 200s via CGM. A1c 5.6 4/2024.     Mary has been grazing on small amounts of food throughout the day. Today he had 2 eggs with cheese and a muffin + a Boost shake. Has Boost when \"not eating much\". Difficult to get other diet recall info, as a lot of things are not appealing, pt cannot recall what he ate yesterday, etc.     They are also watching potassium in diet. Mary stopped drinking milk d/t hyperK, no juice either. Mostly drinking water. Jose does have a fair amt of K.     Feels he is getting stronger; did test frail x 2 during pre-txp eval     LABS      NUTRITION DIAGNOSIS   Inadequate protein intake related to increased protein needs s/p transplant AEB diet recall shows inconsistent and low protein intake.    NUTRITION INTERVENTION   1. Discussed importance of consuming adequate calories and protein for 2 months post-txp to help heal and reduce muscle/fat wasting. Discussed sources of protein and ways to ensure he is increasing his protein intake.  - Continue at minimum 1-2 protein drinks/day while other oral intake is unreliable. Will send "[employer]" message with low K options (ie Boost GC).     2. Discussed " potassium foods and will send list via [employer].     3. Reviewed importance of food safety and increased risk for food-borne illness post-txp. Also discussed potential need to monitor K+, CHO, and Na+ intakes d/t medication side effects.     4. Avoid the following post txp d/t risk for rejection, unknown effects on the organs, and/or potential interactions with immunosuppressants:  - Herbal, Chinese, holistic, chiropractic, natural, alternative medicines and supplements  - Detoxes and cleanses  - Weight loss pills  - Protein powders or other products with extracts or herbs (ie green tea extract)    Patient Understanding: Pt verbalized understanding of education provided.  Expected Engagement: Good  Follow-Up Plans: PRN     NUTRITION GOALS   Include 1-2 protein shakes/day + high protein diet as able x 2 months post txp    Mitali Ly RD, LD, CCTD                                Again, thank you for allowing me to participate in the care of your patient.        Sincerely,        Mitali Ly RD

## 2024-04-17 ENCOUNTER — TELEPHONE (OUTPATIENT)
Dept: TRANSPLANT | Facility: CLINIC | Age: 54
End: 2024-04-17
Payer: COMMERCIAL

## 2024-04-17 NOTE — PROGRESS NOTES
Mary Lopez  :  1970  DOS: 2024  MRN: [id number]  PCP: Jimmie Hoyt    Sports Medicine Clinic Visit    HPI  Mary Lopez is a 53 year old male who is seen as a self referral presenting with right shoulder pain.    - Mechanism of Injury:    -  Fell in 10/2023 on the right shoulder, and has had pain on the anterior aspect that can travel down upper arm intermittently.  Left shoulder is now painful from overuse on the anterior aspect of shoulder.  Similar to the right shoulder but not as intense.   - Pertinent history and prior evaluations:    - XR R shoulder 2023 shows normal anatomic alignment without significant degenerative changes, no acute fractures or dislocations.  - Noted single left rib fracture on 2023  - Doing physical therapy for the right shoulder.   - Liver transplant patient (3/4/2024) secondary to alcoholic cirrhosis, with residual memory problems since surgery. Also with T2DM on long and short acting insulin, currently with large uncontrolled fluctuations in glucoses throughout the day, last HbA1c 5.6 two weeks ago.     - Pain Character:    - Location:  Bilateral shoulder  - Character:  sharp  - Duration:  for last 6 months  - Course:  steady  - Endorses:    - weakness due to pain, decreased ROM/flexibility, decreased joint mobility, decreased strength and impaired posture making it difficult to do ADL, work tasks, recreational activities  - Denies:    - clicking/popping, grinding, mechanical locking symptoms, instability, numbness, tingling  - Alleviating factors:    - rest, activity modifications, tylenol, muscle relaxers, lidocaine patches, Voltaren gel  - Aggravating factors:    - lifting heavy objects, overhead activities, handgrip activities  - Other treatments tried:    - tylenol, lidocaine patches, Voltaren gel, flexeril    - Patient Goals:    - understand the cause of the symptoms, be able to manage the symptoms successfully  - Social History:   -  On disability. Previous work:        Review of Systems  Musculoskeletal: as above  Remainder of review of systems is negative including constitutional, CV, pulmonary, GI, Skin and Neurologic except as noted in HPI or medical history.    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    ACR PRAJAPATI 6-7/9, treated with steroids    Mild hyperlipidemia 2021    Persistent insomnia 2023    Portal hypertension (H) 2023    Scrotal abscess     Secondary esophageal 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 (H) 2023    Tobacco abuse disorder 2020    Type 2 diabetes mellitus with hyperglycemia (H) 2023     Past Surgical History:   Procedure Laterality Date    BENCH LIVER  3/5/2024    Procedure: Bench liver;  Surgeon: Ryder Cortez MD;  Location: [facility] OR    CHOLECYSTECTOMY      COLONOSCOPY N/A 2024    Procedure: COLONOSCOPY, WITH POLYPECTOMY;  Surgeon: Jak Urbina MD;  Location: [facility]    CV RIGHT HEART CATH MEASUREMENTS RECORDED N/A 2024    Procedure: Right Heart Catheterization;  Surgeon: Alfred Tafoya MD;  Location: [facility] HEART CARDIAC CATH LAB    IR LIVER BIOPSY PERCUTANEOUS  3/15/2024    IR RETROPERITONEAL ABSCESS DRAINAGE  2024    TONSILLECTOMY      TRANSPLANT LIVER RECIPIENT  DONOR N/A 3/5/2024    Procedure: Transplant liver  recipient  donor, bile duct stent placement;  Surgeon: Ryder Cortez MD;  Location: [facility] OR    VASECTOMY       Family History   Problem Relation Age of Onset    Anxiety Disorder Mother     Depression Mother     Bipolar Disorder Mother     Chronic Obstructive Pulmonary Disease Mother     Lung Cancer Mother 81    Morbid Obesity Father     Diabetes Father     Diabetes Type 2  Brother     Substance Abuse Maternal Grandfather     Substance Abuse Paternal Grandfather     Colon Cancer No family hx of     Liver Disease No family hx of          Objective  /70 (BP Location: Left arm)     General: healthy, alert and in no acute distress.    HEENT: no scleral icterus or conjunctival erythema.   Skin: no suspicious lesions or rash. No jaundice.   CV: regular rhythm by palpation, 2+ distal pulses.  Resp: normal respiratory effort without conversational dyspnea.   Psych: normal mood and affect.    Gait: nonantalgic, appropriate coordination and balance.     Neuro:        - Sensation to light touch:    - Intact throughout the BUE including all peripheral nerve distributions.        - MSR:       RUE  LUE  - Biceps  2+ 2+  - Brachioradialis 2+ 2+  - Triceps  2+ 2+       - Special tests:   - Spurling's:  Neg     MSK - Shoulder:       - Inspection:    - No significant swelling, erythema, warmth, ecchymosis, lesion, or atrophy noted.        - ROM:    - Full AROM/PROM with lateral shoulder pain during shoulder abduction, slightly with IR/ER       - Palpation:    - TTP at the lateral shoulder, subacromial space, rotator cuff insertion.   - NTTP elsewhere.        - Strength:  (*antalgic)  - Shoulder Abduction   4+*    - Shoulder Flexion   5-*    - Shoulder Internal Rotation  5-*    - Shoulder External Rotation  5-*   - Elbow Flexion   5   - Elbow Extension   5   - Forearm Pronation   5   - Forearm Supination   5   - Wrist Extension   5   - Wrist Flexion    5   - FDI     5   - ADM     5   - FPL     5   - APB     5   -  EIP     5   - EDC     5   - APL/EPB    5            - Special tests:        - Santizo: Positive   - Neers: Positive   - Empty can: Positive for pain and weakness    - [place]:  Neg    - Scarf:  Neg    - Speeds:  Neg    - Yergason:  Neg    - Apprehension/Relocation:  Neg       Radiology  I independently reviewed the available relevant imaging in the chart with my interpretations as above in HPI.     I independently reviewed today's new relevant imaging, with the following interpretation:  - XR B/L shoulders 4/19/2024 shows normal anatomic alignment without acute fracture or dislocation.  No significant degenerative changes.      Assessment  1. Rotator cuff impingement syndrome of left shoulder    2. Rotator cuff impingement syndrome of right shoulder        Plan  Mary Lopez is a pleasant 53 year old male that presents with chronic bilateral shoulder pain.  Right shoulder pain started after a fall directly onto the shoulder in October 2023.  He has had anterior shoulder pain since that time that will occasionally radiate down the arm.  He feels antalgic weakness in the shoulder and has been using it less and less due to the pain.  During that time, he has been overcompensating with the left shoulder and started to feel very similar symptoms on the left.  On exam there is positive impingement signs bilaterally with enough antalgic weakness to be concern for the possibility of rotator cuff tear, as well as the possibility of a more traumatic injury on the right.  He and wife are highly in favor of advanced imaging workup at this time and establishing a good short and long-term treatment plan for the shoulders while he is recovering from recent liver transplantation.     We discussed the nature of the condition and available treatment options, and mutually agreed upon the following plan:    - Imaging:          - Reviewed and independently interpreted the relevant imaging in the chart, including any imaging  ordered for today's clinic.  - Reviewed results and images with patient.   -Will be important to establish the integrity of the rotator cuff complex with advanced imaging, MRI order has been placed for right and left shoulders and instructions given for scheduling the MRI and follow-up with me afterward.  - Medications:          - Discussed pharmacologic options for pain relief.   - May use NSAIDs (Ibuprofen, Naproxen) or Acetaminophen (Tylenol) as needed for pain control.   - Do not take these if previously advised to avoid them for other medical conditions.  - May also use topical medications such as lidocaine, IcyHot, BioFreeze, or Voltaren gel as needed for pain control.    - Voltaren gel is an anti-inflammatory cream that may be used up to 4 times per day over the painful area.   - Injections:          - Discussed possible injection options and alternatives.    - Injection options include: Possibility for intra-articular glenohumeral joint injection, subacromial/subdeltoid bursa injection with corticosteroid if okay with transplant team.    - Deferred injections today and will consider them in the future as needed.   - Therapy:          - Discussed the benefits of therapy vs home exercise program for optimization of range of motion, flexibility, strength, stability and function.   - Preference is for therapy.   -Physical therapy referral placed today and instructed to call [phone] to schedule appointments.   - Modalities:          - May use ice, heat, massage or other modalities as needed.  - Surgery:          - Discussed non-operative and operative treatment options for the patient's condition. Goal is to continue conservative care for as long as possible before surgical intervention would need to be considered.  - Activity:          - Encouraged to remain active and participate in regular activities as symptoms allow.   Avoid or modify exacerbating activities as needed.  - Follow up:          - When  results of the advanced imaging are available to discuss the results and next steps in treatment.   - May follow up sooner for new/worsening symptoms.  - May contact clinic by phone or MyChart for questions or concerns.       Alex Rico DO, CAAMBIKA  [facility]  [facility] - Department of Orthopedic Surgery       Disclaimer:  This note was prepared and written using Dragon Medical dictation software. As a result, there may be errors in the script that have gone undetected. Please consider this when interpreting the information in this note.

## 2024-04-17 NOTE — TELEPHONE ENCOUNTER
Call to Adina to check in.  No answer.  Lm asking Adina to give me a call if she has concerns or questions.

## 2024-04-18 ENCOUNTER — OFFICE VISIT (OUTPATIENT)
Dept: FAMILY MEDICINE | Facility: CLINIC | Age: 54
End: 2024-04-18
Payer: COMMERCIAL

## 2024-04-18 ENCOUNTER — TELEPHONE (OUTPATIENT)
Dept: TRANSPLANT | Facility: CLINIC | Age: 54
End: 2024-04-18

## 2024-04-18 ENCOUNTER — LAB (OUTPATIENT)
Dept: LAB | Facility: CLINIC | Age: 54
End: 2024-04-18
Payer: COMMERCIAL

## 2024-04-18 VITALS
OXYGEN SATURATION: 99 % | WEIGHT: 185.8 LBS | SYSTOLIC BLOOD PRESSURE: 104 MMHG | TEMPERATURE: 97.5 F | RESPIRATION RATE: 16 BRPM | HEIGHT: 67 IN | DIASTOLIC BLOOD PRESSURE: 71 MMHG | HEART RATE: 80 BPM | BODY MASS INDEX: 29.16 KG/M2

## 2024-04-18 DIAGNOSIS — G47.00 PERSISTENT INSOMNIA: Chronic | ICD-10-CM

## 2024-04-18 DIAGNOSIS — Z94.4 LIVER REPLACED BY TRANSPLANT (H): Chronic | ICD-10-CM

## 2024-04-18 DIAGNOSIS — E11.9 TYPE 2 DIABETES MELLITUS WITHOUT COMPLICATION, WITH LONG-TERM CURRENT USE OF INSULIN (H): Primary | ICD-10-CM

## 2024-04-18 DIAGNOSIS — E83.42 HYPOMAGNESEMIA: ICD-10-CM

## 2024-04-18 DIAGNOSIS — Z79.4 TYPE 2 DIABETES MELLITUS WITHOUT COMPLICATION, WITH LONG-TERM CURRENT USE OF INSULIN (H): Primary | ICD-10-CM

## 2024-04-18 DIAGNOSIS — E83.42 HYPOMAGNESEMIA: Primary | ICD-10-CM

## 2024-04-18 DIAGNOSIS — Z94.4 LIVER REPLACED BY TRANSPLANT (H): ICD-10-CM

## 2024-04-18 DIAGNOSIS — M67.431 GANGLION CYST OF WRIST, RIGHT: ICD-10-CM

## 2024-04-18 DIAGNOSIS — D84.9 IMMUNOSUPPRESSED STATUS (H): Chronic | ICD-10-CM

## 2024-04-18 PROBLEM — J90 PLEURAL EFFUSION: Status: RESOLVED | Noted: 2024-03-20 | Resolved: 2024-04-18

## 2024-04-18 PROBLEM — K76.82 HEPATIC ENCEPHALOPATHY (H): Status: RESOLVED | Noted: 2024-01-11 | Resolved: 2024-04-18

## 2024-04-18 PROBLEM — E87.5 HYPERKALEMIA: Status: RESOLVED | Noted: 2024-04-02 | Resolved: 2024-04-18

## 2024-04-18 PROBLEM — Z72.0 TOBACCO ABUSE DISORDER: Chronic | Status: RESOLVED | Noted: 2020-03-11 | Resolved: 2024-04-18

## 2024-04-18 PROBLEM — N17.9 ACUTE KIDNEY FAILURE, UNSPECIFIED (H): Status: RESOLVED | Noted: 2024-02-23 | Resolved: 2024-04-18

## 2024-04-18 PROBLEM — E87.1 HYPONATREMIA: Status: RESOLVED | Noted: 2023-09-15 | Resolved: 2024-04-18

## 2024-04-18 LAB
ALBUMIN SERPL BCG-MCNC: 3.4 G/DL (ref 3.5–5.2)
ALP SERPL-CCNC: 128 U/L (ref 40–150)
ALT SERPL W P-5'-P-CCNC: 7 U/L (ref 0–70)
ANION GAP SERPL CALCULATED.3IONS-SCNC: 9 MMOL/L (ref 7–15)
AST SERPL W P-5'-P-CCNC: 25 U/L (ref 0–45)
BILIRUB DIRECT SERPL-MCNC: 0.38 MG/DL (ref 0–0.3)
BILIRUB SERPL-MCNC: 0.7 MG/DL
BUN SERPL-MCNC: 15.6 MG/DL (ref 6–20)
CALCIUM SERPL-MCNC: 9.1 MG/DL (ref 8.6–10)
CHLORIDE SERPL-SCNC: 104 MMOL/L (ref 98–107)
CREAT SERPL-MCNC: 1.11 MG/DL (ref 0.67–1.17)
DEPRECATED HCO3 PLAS-SCNC: 26 MMOL/L (ref 22–29)
EGFRCR SERPLBLD CKD-EPI 2021: 80 ML/MIN/1.73M2
ERYTHROCYTE [DISTWIDTH] IN BLOOD BY AUTOMATED COUNT: 15.8 % (ref 10–15)
GLUCOSE SERPL-MCNC: 100 MG/DL (ref 70–99)
HCT VFR BLD AUTO: 33.1 % (ref 40–53)
HGB BLD-MCNC: 10.8 G/DL (ref 13.3–17.7)
MAGNESIUM SERPL-MCNC: 1.2 MG/DL (ref 1.7–2.3)
MCH RBC QN AUTO: 30.6 PG (ref 26.5–33)
MCHC RBC AUTO-ENTMCNC: 32.6 G/DL (ref 31.5–36.5)
MCV RBC AUTO: 94 FL (ref 78–100)
PHOSPHATE SERPL-MCNC: 4.5 MG/DL (ref 2.5–4.5)
PLATELET # BLD AUTO: 250 10E3/UL (ref 150–450)
POTASSIUM SERPL-SCNC: 4.4 MMOL/L (ref 3.4–5.3)
PROT SERPL-MCNC: 6.2 G/DL (ref 6.4–8.3)
RBC # BLD AUTO: 3.53 10E6/UL (ref 4.4–5.9)
SODIUM SERPL-SCNC: 139 MMOL/L (ref 135–145)
TACROLIMUS BLD-MCNC: 7.7 UG/L (ref 5–15)
TME LAST DOSE: NORMAL H
TME LAST DOSE: NORMAL H
WBC # BLD AUTO: 4.9 10E3/UL (ref 4–11)

## 2024-04-18 PROCEDURE — 80053 COMPREHEN METABOLIC PANEL: CPT

## 2024-04-18 PROCEDURE — 80197 ASSAY OF TACROLIMUS: CPT

## 2024-04-18 PROCEDURE — 36415 COLL VENOUS BLD VENIPUNCTURE: CPT

## 2024-04-18 PROCEDURE — 83735 ASSAY OF MAGNESIUM: CPT

## 2024-04-18 PROCEDURE — 99214 OFFICE O/P EST MOD 30 MIN: CPT | Performed by: INTERNAL MEDICINE

## 2024-04-18 PROCEDURE — 82248 BILIRUBIN DIRECT: CPT

## 2024-04-18 PROCEDURE — 84100 ASSAY OF PHOSPHORUS: CPT

## 2024-04-18 PROCEDURE — 85027 COMPLETE CBC AUTOMATED: CPT

## 2024-04-18 RX ORDER — ALBUTEROL SULFATE 0.83 MG/ML
2.5 SOLUTION RESPIRATORY (INHALATION)
Status: CANCELLED | OUTPATIENT
Start: 2024-04-18

## 2024-04-18 RX ORDER — MEPERIDINE HYDROCHLORIDE 25 MG/ML
25 INJECTION INTRAMUSCULAR; INTRAVENOUS; SUBCUTANEOUS EVERY 30 MIN PRN
Status: CANCELLED | OUTPATIENT
Start: 2024-04-18

## 2024-04-18 RX ORDER — ALBUTEROL SULFATE 90 UG/1
1-2 AEROSOL, METERED RESPIRATORY (INHALATION)
Status: CANCELLED
Start: 2024-04-18

## 2024-04-18 RX ORDER — METHYLPREDNISOLONE SODIUM SUCCINATE 125 MG/2ML
125 INJECTION, POWDER, LYOPHILIZED, FOR SOLUTION INTRAMUSCULAR; INTRAVENOUS
Status: CANCELLED
Start: 2024-04-18

## 2024-04-18 RX ORDER — TRAMADOL HYDROCHLORIDE 50 MG/1
50 TABLET ORAL EVERY 6 HOURS PRN
COMMUNITY
Start: 2024-04-18 | End: 2024-04-23

## 2024-04-18 RX ORDER — HEPARIN SODIUM,PORCINE 10 UNIT/ML
5-20 VIAL (ML) INTRAVENOUS DAILY PRN
Status: CANCELLED | OUTPATIENT
Start: 2024-04-18

## 2024-04-18 RX ORDER — HEPARIN SODIUM (PORCINE) LOCK FLUSH IV SOLN 100 UNIT/ML 100 UNIT/ML
5 SOLUTION INTRAVENOUS
Status: CANCELLED | OUTPATIENT
Start: 2024-04-18

## 2024-04-18 RX ORDER — MAGNESIUM GLYCINATE 100 MG
200 CAPSULE ORAL 2 TIMES DAILY
Qty: 120 CAPSULE | Refills: 2 | Status: SHIPPED | OUTPATIENT
Start: 2024-04-18 | End: 2024-05-23

## 2024-04-18 RX ORDER — ACETAMINOPHEN 500 MG
500-1000 TABLET ORAL EVERY 4 HOURS PRN
Qty: 90 TABLET | Refills: 3 | OUTPATIENT
Start: 2024-04-18

## 2024-04-18 RX ORDER — DIPHENHYDRAMINE HYDROCHLORIDE 50 MG/ML
50 INJECTION INTRAMUSCULAR; INTRAVENOUS
Status: CANCELLED
Start: 2024-04-18

## 2024-04-18 RX ORDER — EPINEPHRINE 1 MG/ML
0.3 INJECTION, SOLUTION, CONCENTRATE INTRAVENOUS EVERY 5 MIN PRN
Status: CANCELLED | OUTPATIENT
Start: 2024-04-18

## 2024-04-18 NOTE — TELEPHONE ENCOUNTER
"Call to Adina / Kaylee.   Labs overall good but Mg is too low.  Needs IV replacement w/ 3 gm today or asap.  Therapy plan placed. I also changed him to magnesium glycinate as he did have a small emesis this morning.      \"Feet are hurting him more\" but has also been doing a lot more so Adina thinks this may be the cause.  This is not new.  He is seeing his PCP today and will discuss w/ him.  I did tell them that gabapentin is ok for him to take.  Mary wants to go fishing so they plan to do this this weekend.   Appetite improving, does not want to increase remeron.   No other issues, knows how to reach me if has questions or concerns.   "

## 2024-04-18 NOTE — PROGRESS NOTES
"  Assessment & Plan     1.  Liver replaced by transplant on 3/5/2024 for alcohol-related cirrhosis of the liver.  Patient recovering slowly but well.  2.  Immunosuppressed state.  3.  Type 2 diabetes using insulin both long and short acting.  Patient has Dexcom 7 to monitor his blood sugar and is working with diabetic educator and adjusting insulin.  4.  Persistent insomnia for which patient is currently on Remeron and melatonin.  Sleep is slowly improving but he is still has disrupted sleep.  5.  Ganglion cyst of the right wrist.  Advised to leave it alone since is not symptomatic.  6.  Hypomagnesemia on oral magnesium.      BMI  Estimated body mass index is 28.88 kg/m  as calculated from the following:    Height as of this encounter: 1.708 m (5' 7.25\").    Weight as of this encounter: 84.3 kg (185 lb 12.8 oz).       Elmo Hernandez is a 53 year old, presenting for the following health issues:  Transplant Evaluation      4/18/2024     4:29 PM   Additional Questions   Roomed by Leonor EPPERSON   Accompanied by Self         4/18/2024     4:29 PM   Patient Reported Additional Medications   Patient reports taking the following new medications None     History of Present Illness       Reason for visit:  Post liver Tx follow up    He eats 2-3 servings of fruits and vegetables daily.He consumes 1 sweetened beverage(s) daily.He exercises with enough effort to increase his heart rate 9 or less minutes per day.  He exercises with enough effort to increase his heart rate 3 or less days per week.   He is taking medications regularly.     The patient came in today accompanied by his wife for clinic visit.  He had his liver transplant in March.  He went back in with right upper quadrant pain and was felt to have his small subhepatic hematoma.  The pain related to that has finally improved and he has very little discomfort.  His appetite has been poor but only last 2 to 3 days it seems to be little better.  He is working with a " nutritionist.  His blood sugars do vary quite a bit including lows in the middle of the night.  Working with the diabetic educator in adjusting his insulin.  Has Dexcom 7 for continuous blood sugar monitoring.  No chest pain or shortness of breath.  Continues to have issues with sleep though better and slowly improving.  His sleep is quite disruptive and fragmented.    Review of Systems  Constitutional, HEENT, cardiovascular, pulmonary, GI, , musculoskeletal, neuro, skin, endocrine and psych systems are negative, except as otherwise noted.      Objective    There were no vitals taken for this visit.  There is no height or weight on file to calculate BMI.  Physical Exam   GENERAL: alert and no distress  NECK: no adenopathy, no asymmetry, masses, or scars  RESP: lungs clear to auscultation - no rales, rhonchi or wheezes  CV: regular rate and rhythm, normal S1 S2, no S3 or S4, no murmur, click or rub, no peripheral edema  ABDOMEN: soft, nontender, no hepatosplenomegaly, no masses and bowel sounds normal  MS: no gross musculoskeletal defects noted, no edema            Signed Electronically by: Jimmie Hoyt MD

## 2024-04-19 ENCOUNTER — OFFICE VISIT (OUTPATIENT)
Dept: ORTHOPEDICS | Facility: CLINIC | Age: 54
End: 2024-04-19
Payer: COMMERCIAL

## 2024-04-19 ENCOUNTER — TELEPHONE (OUTPATIENT)
Dept: TRANSPLANT | Facility: CLINIC | Age: 54
End: 2024-04-19

## 2024-04-19 ENCOUNTER — ANCILLARY PROCEDURE (OUTPATIENT)
Dept: GENERAL RADIOLOGY | Facility: CLINIC | Age: 54
End: 2024-04-19
Attending: STUDENT IN AN ORGANIZED HEALTH CARE EDUCATION/TRAINING PROGRAM
Payer: COMMERCIAL

## 2024-04-19 ENCOUNTER — INFUSION THERAPY VISIT (OUTPATIENT)
Dept: INFUSION THERAPY | Facility: CLINIC | Age: 54
End: 2024-04-19
Attending: TRANSPLANT SURGERY
Payer: COMMERCIAL

## 2024-04-19 VITALS
HEART RATE: 73 BPM | RESPIRATION RATE: 18 BRPM | WEIGHT: 186.7 LBS | BODY MASS INDEX: 29.02 KG/M2 | SYSTOLIC BLOOD PRESSURE: 114 MMHG | DIASTOLIC BLOOD PRESSURE: 74 MMHG | TEMPERATURE: 97.7 F | OXYGEN SATURATION: 97 %

## 2024-04-19 VITALS — SYSTOLIC BLOOD PRESSURE: 106 MMHG | DIASTOLIC BLOOD PRESSURE: 70 MMHG

## 2024-04-19 DIAGNOSIS — M75.42 ROTATOR CUFF IMPINGEMENT SYNDROME OF LEFT SHOULDER: Primary | ICD-10-CM

## 2024-04-19 DIAGNOSIS — M75.41 ROTATOR CUFF IMPINGEMENT SYNDROME OF RIGHT SHOULDER: ICD-10-CM

## 2024-04-19 DIAGNOSIS — E83.42 HYPOMAGNESEMIA: Primary | ICD-10-CM

## 2024-04-19 DIAGNOSIS — M25.511 BILATERAL SHOULDER PAIN: ICD-10-CM

## 2024-04-19 DIAGNOSIS — Z94.4 LIVER REPLACED BY TRANSPLANT (H): Chronic | ICD-10-CM

## 2024-04-19 DIAGNOSIS — M25.512 BILATERAL SHOULDER PAIN: ICD-10-CM

## 2024-04-19 PROCEDURE — 99204 OFFICE O/P NEW MOD 45 MIN: CPT | Performed by: STUDENT IN AN ORGANIZED HEALTH CARE EDUCATION/TRAINING PROGRAM

## 2024-04-19 PROCEDURE — 250N000011 HC RX IP 250 OP 636: Performed by: TRANSPLANT SURGERY

## 2024-04-19 PROCEDURE — 99207 PR NO CHARGE LOS: CPT

## 2024-04-19 PROCEDURE — 96365 THER/PROPH/DIAG IV INF INIT: CPT

## 2024-04-19 PROCEDURE — 73030 X-RAY EXAM OF SHOULDER: CPT | Mod: RT | Performed by: RADIOLOGY

## 2024-04-19 PROCEDURE — 258N000003 HC RX IP 258 OP 636: Performed by: TRANSPLANT SURGERY

## 2024-04-19 RX ORDER — EPINEPHRINE 1 MG/ML
0.3 INJECTION, SOLUTION INTRAMUSCULAR; SUBCUTANEOUS EVERY 5 MIN PRN
Status: CANCELLED | OUTPATIENT
Start: 2024-04-19

## 2024-04-19 RX ORDER — ALBUTEROL SULFATE 0.83 MG/ML
2.5 SOLUTION RESPIRATORY (INHALATION)
Status: CANCELLED | OUTPATIENT
Start: 2024-04-19

## 2024-04-19 RX ORDER — METHYLPREDNISOLONE SODIUM SUCCINATE 125 MG/2ML
125 INJECTION, POWDER, LYOPHILIZED, FOR SOLUTION INTRAMUSCULAR; INTRAVENOUS
Status: CANCELLED
Start: 2024-04-19

## 2024-04-19 RX ORDER — HEPARIN SODIUM (PORCINE) LOCK FLUSH IV SOLN 100 UNIT/ML 100 UNIT/ML
5 SOLUTION INTRAVENOUS
Status: CANCELLED | OUTPATIENT
Start: 2024-04-19

## 2024-04-19 RX ORDER — ALBUTEROL SULFATE 90 UG/1
1-2 AEROSOL, METERED RESPIRATORY (INHALATION)
Status: CANCELLED
Start: 2024-04-19

## 2024-04-19 RX ORDER — TACROLIMUS 5 MG/1
5 CAPSULE ORAL EVERY 12 HOURS
Qty: 180 CAPSULE | Refills: 3 | Status: SHIPPED | OUTPATIENT
Start: 2024-04-19 | End: 2024-04-26

## 2024-04-19 RX ORDER — DIPHENHYDRAMINE HYDROCHLORIDE 50 MG/ML
50 INJECTION INTRAMUSCULAR; INTRAVENOUS
Status: CANCELLED
Start: 2024-04-19

## 2024-04-19 RX ORDER — HEPARIN SODIUM,PORCINE 10 UNIT/ML
5-20 VIAL (ML) INTRAVENOUS DAILY PRN
Status: CANCELLED | OUTPATIENT
Start: 2024-04-19

## 2024-04-19 RX ORDER — TACROLIMUS 1 MG/1
3 CAPSULE ORAL EVERY 12 HOURS
Qty: 540 CAPSULE | Refills: 3 | Status: SHIPPED | OUTPATIENT
Start: 2024-04-19 | End: 2024-04-26

## 2024-04-19 RX ORDER — MEPERIDINE HYDROCHLORIDE 25 MG/ML
25 INJECTION INTRAMUSCULAR; INTRAVENOUS; SUBCUTANEOUS EVERY 30 MIN PRN
Status: CANCELLED | OUTPATIENT
Start: 2024-04-19

## 2024-04-19 RX ADMIN — MAGNESIUM SULFATE HEPTAHYDRATE 3 G: 500 INJECTION, SOLUTION INTRAMUSCULAR; INTRAVENOUS at 13:30

## 2024-04-19 RX ADMIN — SODIUM CHLORIDE 250 ML: 9 INJECTION, SOLUTION INTRAVENOUS at 13:31

## 2024-04-19 NOTE — PATIENT INSTRUCTIONS
MRI Scheduling Instructions  Please follow both steps below    1.  Advanced imaging is done by appointment. Please call [facility] ([facility]/[place]) [phone] to schedule your MRI at your earliest convenience.   - Some insurance companies may require a prior authorization to be completed which can delay the time until you are able to schedule your appointment.     - If you are active on MyChart, you may have access to your test results before your provider is able to review the study and advise on next steps.      2. After your MRI has been scheduled, please call [phone] to get on my schedule for an in-person or telephone follow up appointment to discuss the results and updated treatment recommendations. Please schedule follow up appointment for 1-2 days after the MRI date.

## 2024-04-19 NOTE — LETTER
2024         RE: Mary Lopez  [address]  [place] [address]        Dear Colleague,    Thank you for referring your patient, Mary Lopez, to the [facility] [place]. Please see a copy of my visit note below.    Mary Lopez  :  1970  DOS: 2024  MRN: [id number]  PCP: Jimmie Hoyt    Sports Medicine Clinic Visit    HPI  Mary Lopez is a 53 year old male who is seen as a self referral presenting with right shoulder pain.    - Mechanism of Injury:    -  Fell in 10/2023 on the right shoulder, and has had pain on the anterior aspect that can travel down upper arm intermittently.  Left shoulder is now painful from overuse on the anterior aspect of shoulder.  Similar to the right shoulder but not as intense.   - Pertinent history and prior evaluations:    - XR R shoulder 2023 shows normal anatomic alignment without significant degenerative changes, no acute fractures or dislocations.  - Noted single left rib fracture on 2023  - Doing physical therapy for the right shoulder.   - Liver transplant patient (3/4/2024) secondary to alcoholic cirrhosis, with residual memory problems since surgery. Also with T2DM on long and short acting insulin, currently with large uncontrolled fluctuations in glucoses throughout the day, last HbA1c 5.6 two weeks ago.     - Pain Character:    - Location:  Bilateral shoulder  - Character:  sharp  - Duration:  for last 6 months  - Course:  steady  - Endorses:    - weakness due to pain, decreased ROM/flexibility, decreased joint mobility, decreased strength and impaired posture making it difficult to do ADL, work tasks, recreational activities  - Denies:    - clicking/popping, grinding, mechanical locking symptoms, instability, numbness, tingling  - Alleviating factors:    - rest, activity modifications, tylenol, muscle relaxers, lidocaine patches, Voltaren gel  - Aggravating factors:     - lifting heavy objects, overhead activities, handgrip activities  - Other treatments tried:    - tylenol, lidocaine patches, Voltaren gel, flexeril    - Patient Goals:    - understand the cause of the symptoms, be able to manage the symptoms successfully  - Social History:   - On disability. Previous work:        Review of Systems  Musculoskeletal: as above  Remainder of review of systems is negative including constitutional, CV, pulmonary, GI, Skin and Neurologic except as noted in HPI or medical history.    Past Medical History:   Diagnosis Date     ANGEL (acute kidney injury) (H24)      Alcoholic cirrhosis of liver without ascites (H) 07/13/2023     Alcoholic hepatitis with ascites (H28) 10/03/2023     Alcoholic hepatitis without ascites (H28) 07/13/2023     Closed fracture of one rib of left side 09/14/2023     Concussion without loss of consciousness 03/11/2020     Decompensated hepatic cirrhosis (H) 09/15/2023     Diabetes mellitus, type 2 (H) 11/22/2023     Essential hypertension 03/11/2020     Ganglion cyst of wrist, right 04/18/2024     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)      Liver replaced by transplant (H) 03/05/2024     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    ACR PRAJAPATI 6-7/9, treated with steroids     Mild hyperlipidemia 12/07/2021     Persistent insomnia 07/13/2023     Portal hypertension (H) 07/13/2023     Scrotal abscess      Secondary esophageal 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 (H) 07/13/2023     Tobacco abuse disorder 03/11/2020     Type 2 diabetes mellitus with hyperglycemia (H) 12/22/2023     Past Surgical History:   Procedure Laterality Date     BENCH LIVER  3/5/2024    Procedure: Bench liver;  Surgeon: Ryder Cortez MD;  Location: [facility]     CHOLECYSTECTOMY       COLONOSCOPY N/A 1/2/2024    Procedure: COLONOSCOPY, WITH POLYPECTOMY;  Surgeon: Jak Urbina MD;  Location: [facility]     CV RIGHT HEART CATH MEASUREMENTS RECORDED N/A 1/30/2024    Procedure:  Right Heart Catheterization;  Surgeon: Alfred Tafoya MD;  Location: [facility] CATH [facility]     IR LIVER BIOPSY PERCUTANEOUS  3/15/2024     IR RETROPERITONEAL ABSCESS DRAINAGE  2024     TONSILLECTOMY       TRANSPLANT LIVER RECIPIENT  DONOR N/A 3/5/2024    Procedure: Transplant liver recipient  donor, bile duct stent placement;  Surgeon: Ryder Cortez MD;  Location: [facility] OR     VASECTOMY       Family History   Problem Relation Age of Onset     Anxiety Disorder Mother      Depression Mother      Bipolar Disorder Mother      Chronic Obstructive Pulmonary Disease Mother      Lung Cancer Mother 81     Morbid Obesity Father      Diabetes Father      Diabetes Type 2  Brother      Substance Abuse Maternal Grandfather      Substance Abuse Paternal Grandfather      Colon Cancer No family hx of      Liver Disease No family hx of          Objective  /70 (BP Location: Left arm)     General: healthy, alert and in no acute distress.    HEENT: no scleral icterus or conjunctival erythema.   Skin: no suspicious lesions or rash. No jaundice.   CV: regular rhythm by palpation, 2+ distal pulses.  Resp: normal respiratory effort without conversational dyspnea.   Psych: normal mood and affect.    Gait: nonantalgic, appropriate coordination and balance.     Neuro:        - Sensation to light touch:    - Intact throughout the BUE including all peripheral nerve distributions.        - MSR:       RUE  LUE  - Biceps  2+ 2+  - Brachioradialis 2+ 2+  - Triceps  2+ 2+       - Special tests:   - Spurling's:  Neg     MSK - Shoulder:       - Inspection:    - No significant swelling, erythema, warmth, ecchymosis, lesion, or atrophy noted.        - ROM:    - Full AROM/PROM with lateral shoulder pain during shoulder abduction, slightly with IR/ER       - Palpation:    - TTP at the lateral shoulder, subacromial space, rotator cuff insertion.   - NTTP elsewhere.        - Strength:  (*antalgic)  - Shoulder  Abduction   4+*    - Shoulder Flexion   5-*    - Shoulder Internal Rotation  5-*    - Shoulder External Rotation  5-*   - Elbow Flexion   5   - Elbow Extension   5   - Forearm Pronation   5   - Forearm Supination   5   - Wrist Extension   5   - Wrist Flexion    5   - FDI     5   - ADM     5   - FPL     5   - APB     5   - EIP     5   - EDC     5   - APL/EPB    5            - Special tests:        - Santizo: Positive   - Neers: Positive   - Empty can: Positive for pain and weakness    - Jean:  Neg    - Scarf:  Neg    - Speeds:  Neg    - Yergason:  Neg    - Apprehension/Relocation:  Neg       Radiology  I independently reviewed the available relevant imaging in the chart with my interpretations as above in HPI.     I independently reviewed today's new relevant imaging, with the following interpretation:  - XR B/L shoulders 4/19/2024 shows normal anatomic alignment without acute fracture or dislocation.  No significant degenerative changes.      Assessment  1. Rotator cuff impingement syndrome of left shoulder    2. Rotator cuff impingement syndrome of right shoulder        Plan  Mary Lopez is a pleasant 53 year old male that presents with chronic bilateral shoulder pain.  Right shoulder pain started after a fall directly onto the shoulder in October 2023.  He has had anterior shoulder pain since that time that will occasionally radiate down the arm.  He feels antalgic weakness in the shoulder and has been using it less and less due to the pain.  During that time, he has been overcompensating with the left shoulder and started to feel very similar symptoms on the left.  On exam there is positive impingement signs bilaterally with enough antalgic weakness to be concern for the possibility of rotator cuff tear, as well as the possibility of a more traumatic injury on the right.  He and wife are highly in favor of advanced imaging workup at this time and establishing a good short and long-term treatment  plan for the shoulders while he is recovering from recent liver transplantation.     We discussed the nature of the condition and available treatment options, and mutually agreed upon the following plan:    - Imaging:          - Reviewed and independently interpreted the relevant imaging in the chart, including any imaging ordered for today's clinic.  - Reviewed results and images with patient.   -Will be important to establish the integrity of the rotator cuff complex with advanced imaging, MRI order has been placed for right and left shoulders and instructions given for scheduling the MRI and follow-up with me afterward.  - Medications:          - Discussed pharmacologic options for pain relief.   - May use NSAIDs (Ibuprofen, Naproxen) or Acetaminophen (Tylenol) as needed for pain control.   - Do not take these if previously advised to avoid them for other medical conditions.  - May also use topical medications such as lidocaine, IcyHot, BioFreeze, or Voltaren gel as needed for pain control.    - Voltaren gel is an anti-inflammatory cream that may be used up to 4 times per day over the painful area.   - Injections:          - Discussed possible injection options and alternatives.    - Injection options include: Possibility for intra-articular glenohumeral joint injection, subacromial/subdeltoid bursa injection with corticosteroid if okay with transplant team.    - Deferred injections today and will consider them in the future as needed.   - Therapy:          - Discussed the benefits of therapy vs home exercise program for optimization of range of motion, flexibility, strength, stability and function.   - Preference is for therapy.   -Physical therapy referral placed today and instructed to call [phone] to schedule appointments.   - Modalities:          - May use ice, heat, massage or other modalities as needed.  - Surgery:          - Discussed non-operative and operative treatment options for the patient's  condition. Goal is to continue conservative care for as long as possible before surgical intervention would need to be considered.  - Activity:          - Encouraged to remain active and participate in regular activities as symptoms allow.   Avoid or modify exacerbating activities as needed.  - Follow up:          - When results of the advanced imaging are available to discuss the results and next steps in treatment.   - May follow up sooner for new/worsening symptoms.  - May contact clinic by phone or MyChart for questions or concerns.       Alex Rico DO, CAQSM  [facility]  [facility] - Department of Orthopedic Surgery       Disclaimer:  This note was prepared and written using Dragon Medical dictation software. As a result, there may be errors in the script that have gone undetected. Please consider this when interpreting the information in this note.       Again, thank you for allowing me to participate in the care of your patient.        Sincerely,        Alex Rico DO

## 2024-04-19 NOTE — PROGRESS NOTES
Infusion Nursing Note:  Mary Luisrowan Lopez presents today for IV mag replacement.    Patient seen by provider today: No   present during visit today: Not Applicable.    Note: This is the pts first time to [facility]. Orientation provided to infusion center, pt also instructed on how and when to use her call light. Questions answered to pt satisfaction.     Patient states he feels at his baseline today.       Intravenous Access:  Peripheral IV placed.    Treatment Conditions:  Magnesium   Date Value Ref Range Status   04/18/2024 1.2 (L) 1.7 - 2.3 mg/dL Final   11/29/2005 1.9 1.6 - 2.3 mg/dL Final         Post Infusion Assessment:  Patient tolerated infusion without incident.  Blood return noted pre and post infusion.  Site patent and intact, free from redness, edema or discomfort.  No evidence of extravasations.  Access discontinued per protocol.       Discharge Plan:   Patient and/or family verbalized understanding of discharge instructions and all questions answered.  AVS to patient via [facility].  Patient will return 4/22 for next appointment. Future appts have been reviewed and crosschecked with appt note and plan.  Patient discharged in stable condition accompanied by: wife.  Departure Mode: Ambulatory.      Hoa Gavin RN

## 2024-04-19 NOTE — TELEPHONE ENCOUNTER
ISSUE:   Tacrolimus IR level 7.7 on 4/18, goal 10, dose 7 mg BID.    PLAN:   Call Patient and confirm this was an accurate 12-hour trough.   Verify Tacrolimus IR dose.  Confirm no new medications or or missed doses.   Confirm no new illness / infection / diarrhea.   If accurate trough and accurate dose, increase Tacrolimus IR dose to 8 mg BID     Repeat labs on Monday.   Lore Mckeon RN     OUTCOME:   Spoke with [facility], they confirm accurate trough level and current dose 7 mg BID.   [facility] confirmed dose change to 8 mg BID.  [facility] agreed to repeat labs on Monday.     Orders sent to preferred pharmacy for dose change and lab for repeat labs.   [facility] voiced understanding of plan.     Valentina Enamorado LPN

## 2024-04-22 ENCOUNTER — OFFICE VISIT (OUTPATIENT)
Dept: TRANSPLANT | Facility: CLINIC | Age: 54
End: 2024-04-22
Attending: TRANSPLANT SURGERY
Payer: COMMERCIAL

## 2024-04-22 ENCOUNTER — LAB (OUTPATIENT)
Dept: LAB | Facility: CLINIC | Age: 54
End: 2024-04-22
Attending: TRANSPLANT SURGERY
Payer: COMMERCIAL

## 2024-04-22 ENCOUNTER — ANCILLARY PROCEDURE (OUTPATIENT)
Dept: GENERAL RADIOLOGY | Facility: CLINIC | Age: 54
End: 2024-04-22
Attending: TRANSPLANT SURGERY
Payer: COMMERCIAL

## 2024-04-22 VITALS
DIASTOLIC BLOOD PRESSURE: 84 MMHG | OXYGEN SATURATION: 96 % | HEART RATE: 91 BPM | SYSTOLIC BLOOD PRESSURE: 119 MMHG | TEMPERATURE: 97.7 F | BODY MASS INDEX: 29.38 KG/M2 | WEIGHT: 189 LBS

## 2024-04-22 DIAGNOSIS — Z94.4 LIVER REPLACED BY TRANSPLANT (H): ICD-10-CM

## 2024-04-22 LAB
ALBUMIN SERPL BCG-MCNC: 3.3 G/DL (ref 3.5–5.2)
ALP SERPL-CCNC: 130 U/L (ref 40–150)
ALT SERPL W P-5'-P-CCNC: 6 U/L (ref 0–70)
ANION GAP SERPL CALCULATED.3IONS-SCNC: 9 MMOL/L (ref 7–15)
AST SERPL W P-5'-P-CCNC: 18 U/L (ref 0–45)
BILIRUB DIRECT SERPL-MCNC: 0.35 MG/DL (ref 0–0.3)
BILIRUB SERPL-MCNC: 0.7 MG/DL
BUN SERPL-MCNC: 14.6 MG/DL (ref 6–20)
CALCIUM SERPL-MCNC: 9.5 MG/DL (ref 8.6–10)
CHLORIDE SERPL-SCNC: 106 MMOL/L (ref 98–107)
CREAT SERPL-MCNC: 1.14 MG/DL (ref 0.67–1.17)
DEPRECATED HCO3 PLAS-SCNC: 24 MMOL/L (ref 22–29)
EGFRCR SERPLBLD CKD-EPI 2021: 77 ML/MIN/1.73M2
ERYTHROCYTE [DISTWIDTH] IN BLOOD BY AUTOMATED COUNT: 15.2 % (ref 10–15)
GLUCOSE SERPL-MCNC: 154 MG/DL (ref 70–99)
HCT VFR BLD AUTO: 34.4 % (ref 40–53)
HGB BLD-MCNC: 11.4 G/DL (ref 13.3–17.7)
MAGNESIUM SERPL-MCNC: 1.4 MG/DL (ref 1.7–2.3)
MCH RBC QN AUTO: 30.6 PG (ref 26.5–33)
MCHC RBC AUTO-ENTMCNC: 33.1 G/DL (ref 31.5–36.5)
MCV RBC AUTO: 92 FL (ref 78–100)
PHOSPHATE SERPL-MCNC: 4.1 MG/DL (ref 2.5–4.5)
PLATELET # BLD AUTO: 219 10E3/UL (ref 150–450)
POTASSIUM SERPL-SCNC: 4.9 MMOL/L (ref 3.4–5.3)
PROT SERPL-MCNC: 6.3 G/DL (ref 6.4–8.3)
RBC # BLD AUTO: 3.73 10E6/UL (ref 4.4–5.9)
SODIUM SERPL-SCNC: 139 MMOL/L (ref 135–145)
TACROLIMUS BLD-MCNC: 8.9 UG/L (ref 5–15)
TME LAST DOSE: NORMAL H
TME LAST DOSE: NORMAL H
WBC # BLD AUTO: 4.5 10E3/UL (ref 4–11)

## 2024-04-22 PROCEDURE — 99000 SPECIMEN HANDLING OFFICE-LAB: CPT | Performed by: PATHOLOGY

## 2024-04-22 PROCEDURE — 80197 ASSAY OF TACROLIMUS: CPT | Performed by: TRANSPLANT SURGERY

## 2024-04-22 PROCEDURE — 99213 OFFICE O/P EST LOW 20 MIN: CPT | Performed by: TRANSPLANT SURGERY

## 2024-04-22 PROCEDURE — 74019 RADEX ABDOMEN 2 VIEWS: CPT | Mod: GC | Performed by: RADIOLOGY

## 2024-04-22 PROCEDURE — 36415 COLL VENOUS BLD VENIPUNCTURE: CPT | Performed by: PATHOLOGY

## 2024-04-22 PROCEDURE — 83735 ASSAY OF MAGNESIUM: CPT | Performed by: PATHOLOGY

## 2024-04-22 PROCEDURE — 99213 OFFICE O/P EST LOW 20 MIN: CPT | Mod: 24 | Performed by: TRANSPLANT SURGERY

## 2024-04-22 PROCEDURE — 84100 ASSAY OF PHOSPHORUS: CPT | Performed by: PATHOLOGY

## 2024-04-22 PROCEDURE — 82248 BILIRUBIN DIRECT: CPT | Performed by: PATHOLOGY

## 2024-04-22 PROCEDURE — 85027 COMPLETE CBC AUTOMATED: CPT | Performed by: PATHOLOGY

## 2024-04-22 PROCEDURE — 80053 COMPREHEN METABOLIC PANEL: CPT | Performed by: PATHOLOGY

## 2024-04-22 RX ORDER — RAMELTEON 8 MG/1
8 TABLET ORAL AT BEDTIME
Qty: 30 TABLET | Refills: 0 | Status: SHIPPED | OUTPATIENT
Start: 2024-04-22 | End: 2024-04-26

## 2024-04-22 NOTE — PROGRESS NOTES
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 Admitted on 3/30/24 for IR aspiration of hematoma, no drain.     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 Treated with Methylprednisolone 500mg x3 (3/16-3/18) followed by taper.     Transplant coordinator Lore Mckeon [phone].  Biliary stent:  yes  Donor status:  DBD  CMV D - / R -  EBV D + / R +  Anticoagulation plan: ASA 325mg x 6months     Assessment/Plan:     Returns today doing well  Having some muscle soreness in back and legs, asking about flexeril - ok to take flexeril  Taking prograf 8 mg BID, myfortic (diarrhea), prednisone 5 mg  Last prograf level was 7.7, keep around 10, will follow up today's level  On  mg for 6 months for hepatic artery prophylaxis  Eating better per patient, weight is stable  Opportunistic infectious prophylaxis Valcyte x3 mos and Bactrim x6 months   Daily long acting insulin 14 U - seeing endocrine next week  Biliary stent in place per operative report and CT scan on 3/30 - will check abdominal xray to evaluate for presence of stent  Incision healing great  Having trouble sleeping with Melatonin, will trial Ramelteon.    Vital signs:  Temp: 97.7  F (36.5  C) Temp src: Oral BP: 119/84 Pulse: 91     SpO2: 96 %       Weight: 85.7 kg (189 lb)  Estimated body mass index is 29.38 kg/m  as calculated from the following:    Height as of 4/18/24: 1.708 m (5' 7.25\").    Weight as of this encounter: 85.7 kg (189 lb).    Exam:    Awake, alert  RRR  Normal respiratory rate  Abdomen soft, nontender, nondistended  Well " healed incision and drain site  Normal skin color/turgor  2+ pulses  No LE edema    CBC RESULTS:   Recent Labs   Lab Test 04/22/24  0909   WBC 4.5   RBC 3.73*   HGB 11.4*   HCT 34.4*   MCV 92   MCH 30.6   MCHC 33.1   RDW 15.2*           Last Comprehensive Metabolic Panel:  Sodium   Date Value Ref Range Status   04/18/2024 139 135 - 145 mmol/L Final     Comment:     Reference intervals for this test were updated on 09/26/2023 to more accurately reflect our healthy population. There may be differences in the flagging of prior results with similar values performed with this method. Interpretation of those prior results can be made in the context of the updated reference intervals.    07/05/2020 139 133 - 144 mmol/L Final     Potassium   Date Value Ref Range Status   04/18/2024 4.4 3.4 - 5.3 mmol/L Final   03/12/2022 3.8 3.4 - 5.3 mmol/L Final   07/05/2020 4.2 3.4 - 5.3 mmol/L Final     Potassium POCT   Date Value Ref Range Status   03/05/2024 4.1 3.4 - 5.3 mmol/L Final     Chloride   Date Value Ref Range Status   04/18/2024 104 98 - 107 mmol/L Final   03/12/2022 101 94 - 109 mmol/L Final   07/05/2020 102 94 - 109 mmol/L Final     Carbon Dioxide   Date Value Ref Range Status   07/05/2020 28 20 - 32 mmol/L Final     Carbon Dioxide (CO2)   Date Value Ref Range Status   04/18/2024 26 22 - 29 mmol/L Final   03/12/2022 25 20 - 32 mmol/L Final     Anion Gap   Date Value Ref Range Status   04/18/2024 9 7 - 15 mmol/L Final   03/12/2022 10 3 - 14 mmol/L Final   07/05/2020 9 3 - 14 mmol/L Final     Glucose   Date Value Ref Range Status   04/18/2024 100 (H) 70 - 99 mg/dL Final   03/12/2022 135 (H) 70 - 99 mg/dL Final   07/05/2020 113 (H) 70 - 99 mg/dL Final     GLUCOSE BY METER POCT   Date Value Ref Range Status   04/02/2024 238 (H) 70 - 99 mg/dL Final     Urea Nitrogen   Date Value Ref Range Status   04/18/2024 15.6 6.0 - 20.0 mg/dL Final   03/12/2022 11 7 - 30 mg/dL Final   07/05/2020 13 7 - 30 mg/dL Final     Creatinine    Date Value Ref Range Status   04/18/2024 1.11 0.67 - 1.17 mg/dL Final   07/05/2020 0.95 0.66 - 1.25 mg/dL Final     GFR Estimate   Date Value Ref Range Status   04/18/2024 80 >60 mL/min/1.73m2 Final   07/05/2020 >90 >60 mL/min/[1.73_m2] Final     Comment:     Non  GFR Calc  Starting 12/18/2018, serum creatinine based estimated GFR (eGFR) will be   calculated using the Chronic Kidney Disease Epidemiology Collaboration   (CKD-EPI) equation.       Calcium   Date Value Ref Range Status   04/18/2024 9.1 8.6 - 10.0 mg/dL Final   07/05/2020 9.7 8.5 - 10.1 mg/dL Final     Bilirubin Total   Date Value Ref Range Status   04/18/2024 0.7 <=1.2 mg/dL Final   12/19/2006 0.8 0.2 - 1.3 mg/dL Final     Alkaline Phosphatase   Date Value Ref Range Status   04/18/2024 128 40 - 150 U/L Final     Comment:     Reference intervals for this test were updated on 11/14/2023 to more accurately reflect our healthy population. There may be differences in the flagging of prior results with similar values performed with this method. Interpretation of those prior results can be made in the context of the updated reference intervals.   12/19/2006 117 40 - 150 U/L Final     ALT   Date Value Ref Range Status   04/18/2024 7 0 - 70 U/L Final     Comment:     Reference intervals for this test were updated on 6/12/2023 to more accurately reflect our healthy population. There may be differences in the flagging of prior results with similar values performed with this method. Interpretation of those prior results can be made in the context of the updated reference intervals.     12/19/2006 50 0 - 70 U/L Final     AST   Date Value Ref Range Status   04/18/2024 25 0 - 45 U/L Final     Comment:     Reference intervals for this test were updated on 6/12/2023 to more accurately reflect our healthy population. There may be differences in the flagging of prior results with similar values performed with this method. Interpretation of those prior  results can be made in the context of the updated reference intervals.   12/19/2006 52 0 - 55 U/L Final     Dave Mcmanus MD  Transplant Surgery Fellow

## 2024-04-22 NOTE — LETTER
4/22/2024         RE: Mary Lopez  [address]  [place] [address]        Dear Colleague,    Thank you for referring your patient, Mary Lopez, to the [facility] TR[facility]PL[facility]. Please see a copy of my visit note below.    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 Admitted on 3/30/24 for IR aspiration of hematoma, no drain.     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 Treated with Methylprednisolone 500mg x3 (3/16-3/18) followed by taper.     Transplant coordinator Lore Mckeon [phone].  Biliary stent:  yes  Donor status:  DBD  CMV D - / R -  EBV D + / R +  Anticoagulation plan: ASA 325mg x 6months     Assessment/Plan:     Returns today doing well  Having some muscle soreness in back and legs, asking about flexeril - ok to take flexeril  Taking prograf 8 mg BID, myfortic (diarrhea), prednisone 5 mg  Last prograf level was 7.7, keep around 10, will follow up today's level  On  mg for 6 months for hepatic artery prophylaxis  Eating better per patient, weight is stable  Opportunistic infectious prophylaxis Valcyte x3 mos and Bactrim x6 months   Daily long acting insulin 14 U - seeing endocrine next week  Biliary stent in place per operative report and CT scan on 3/30 - will check abdominal xray to evaluate for presence of stent  Incision healing great  Having trouble sleeping with Melatonin, will trial Ramelteon.    Vital signs:  Temp: 97.7  F (36.5  C) Temp src: Oral BP: 119/84 Pulse: 91     SpO2: 96 %       Weight: 85.7 kg (189  "lb)  Estimated body mass index is 29.38 kg/m  as calculated from the following:    Height as of 4/18/24: 1.708 m (5' 7.25\").    Weight as of this encounter: 85.7 kg (189 lb).    Exam:    Awake, alert  RRR  Normal respiratory rate  Abdomen soft, nontender, nondistended  Well healed incision and drain site  Normal skin color/turgor  2+ pulses  No LE edema    CBC RESULTS:   Recent Labs   Lab Test 04/22/24  0909   WBC 4.5   RBC 3.73*   HGB 11.4*   HCT 34.4*   MCV 92   MCH 30.6   MCHC 33.1   RDW 15.2*           Last Comprehensive Metabolic Panel:  Sodium   Date Value Ref Range Status   04/18/2024 139 135 - 145 mmol/L Final     Comment:     Reference intervals for this test were updated on 09/26/2023 to more accurately reflect our healthy population. There may be differences in the flagging of prior results with similar values performed with this method. Interpretation of those prior results can be made in the context of the updated reference intervals.    07/05/2020 139 133 - 144 mmol/L Final     Potassium   Date Value Ref Range Status   04/18/2024 4.4 3.4 - 5.3 mmol/L Final   03/12/2022 3.8 3.4 - 5.3 mmol/L Final   07/05/2020 4.2 3.4 - 5.3 mmol/L Final     Potassium POCT   Date Value Ref Range Status   03/05/2024 4.1 3.4 - 5.3 mmol/L Final     Chloride   Date Value Ref Range Status   04/18/2024 104 98 - 107 mmol/L Final   03/12/2022 101 94 - 109 mmol/L Final   07/05/2020 102 94 - 109 mmol/L Final     Carbon Dioxide   Date Value Ref Range Status   07/05/2020 28 20 - 32 mmol/L Final     Carbon Dioxide (CO2)   Date Value Ref Range Status   04/18/2024 26 22 - 29 mmol/L Final   03/12/2022 25 20 - 32 mmol/L Final     Anion Gap   Date Value Ref Range Status   04/18/2024 9 7 - 15 mmol/L Final   03/12/2022 10 3 - 14 mmol/L Final   07/05/2020 9 3 - 14 mmol/L Final     Glucose   Date Value Ref Range Status   04/18/2024 100 (H) 70 - 99 mg/dL Final   03/12/2022 135 (H) 70 - 99 mg/dL Final   07/05/2020 113 (H) 70 - 99 mg/dL " Final     GLUCOSE BY METER POCT   Date Value Ref Range Status   04/02/2024 238 (H) 70 - 99 mg/dL Final     Urea Nitrogen   Date Value Ref Range Status   04/18/2024 15.6 6.0 - 20.0 mg/dL Final   03/12/2022 11 7 - 30 mg/dL Final   07/05/2020 13 7 - 30 mg/dL Final     Creatinine   Date Value Ref Range Status   04/18/2024 1.11 0.67 - 1.17 mg/dL Final   07/05/2020 0.95 0.66 - 1.25 mg/dL Final     GFR Estimate   Date Value Ref Range Status   04/18/2024 80 >60 mL/min/1.73m2 Final   07/05/2020 >90 >60 mL/min/[1.73_m2] Final     Comment:     Non  GFR Calc  Starting 12/18/2018, serum creatinine based estimated GFR (eGFR) will be   calculated using the Chronic Kidney Disease Epidemiology Collaboration   (CKD-EPI) equation.       Calcium   Date Value Ref Range Status   04/18/2024 9.1 8.6 - 10.0 mg/dL Final   07/05/2020 9.7 8.5 - 10.1 mg/dL Final     Bilirubin Total   Date Value Ref Range Status   04/18/2024 0.7 <=1.2 mg/dL Final   12/19/2006 0.8 0.2 - 1.3 mg/dL Final     Alkaline Phosphatase   Date Value Ref Range Status   04/18/2024 128 40 - 150 U/L Final     Comment:     Reference intervals for this test were updated on 11/14/2023 to more accurately reflect our healthy population. There may be differences in the flagging of prior results with similar values performed with this method. Interpretation of those prior results can be made in the context of the updated reference intervals.   12/19/2006 117 40 - 150 U/L Final     ALT   Date Value Ref Range Status   04/18/2024 7 0 - 70 U/L Final     Comment:     Reference intervals for this test were updated on 6/12/2023 to more accurately reflect our healthy population. There may be differences in the flagging of prior results with similar values performed with this method. Interpretation of those prior results can be made in the context of the updated reference intervals.     12/19/2006 50 0 - 70 U/L Final     AST   Date Value Ref Range Status   04/18/2024 25 0 - 45  U/L Final     Comment:     Reference intervals for this test were updated on 6/12/2023 to more accurately reflect our healthy population. There may be differences in the flagging of prior results with similar values performed with this method. Interpretation of those prior results can be made in the context of the updated reference intervals.   12/19/2006 52 0 - 55 U/L Final     Dave Mcmanus MD  Transplant Surgery Fellow                 Again, thank you for allowing me to participate in the care of your patient.        Sincerely,        Ryder Cortez MD

## 2024-04-22 NOTE — NURSING NOTE
"Chief Complaint   Patient presents with    Follow Up     Liver transplant follow-up     Vital signs:  Temp: 97.7  F (36.5  C) Temp src: Oral 119/84 Pulse: 91     SpO2: 96 %       Weight: 85.7 kg (189 lb)  Estimated body mass index is 29.38 kg/m  as calculated from the following:    Height as of 4/18/24: 1.708 m (5' 7.25\").    Weight as of this encounter: 85.7 kg (189 lb).      Dheeraj Cruz RN on 4/22/2024 at 9:26 AM    "

## 2024-04-23 ENCOUNTER — MYC REFILL (OUTPATIENT)
Dept: TRANSPLANT | Facility: CLINIC | Age: 54
End: 2024-04-23
Payer: COMMERCIAL

## 2024-04-23 ENCOUNTER — MYC REFILL (OUTPATIENT)
Dept: FAMILY MEDICINE | Facility: CLINIC | Age: 54
End: 2024-04-23
Payer: COMMERCIAL

## 2024-04-23 DIAGNOSIS — Z94.4 LIVER REPLACED BY TRANSPLANT (H): Chronic | ICD-10-CM

## 2024-04-23 DIAGNOSIS — R63.0 POOR APPETITE: ICD-10-CM

## 2024-04-23 RX ORDER — TRAMADOL HYDROCHLORIDE 50 MG/1
50 TABLET ORAL DAILY PRN
Qty: 30 TABLET | Refills: 0 | Status: SHIPPED | OUTPATIENT
Start: 2024-04-23 | End: 2024-06-04

## 2024-04-23 RX ORDER — MIRTAZAPINE 15 MG/1
30 TABLET, FILM COATED ORAL AT BEDTIME
Qty: 60 TABLET | Refills: 0 | Status: SHIPPED | OUTPATIENT
Start: 2024-04-23 | End: 2024-05-23

## 2024-04-24 ENCOUNTER — TELEPHONE (OUTPATIENT)
Dept: TRANSPLANT | Facility: CLINIC | Age: 54
End: 2024-04-24
Payer: COMMERCIAL

## 2024-04-24 NOTE — TELEPHONE ENCOUNTER
Note: Due to record-high volumes, our turn-around time is taking longer than usual . We are currently 10 business days behind in the pools.   We are working diligently to submit all requests in a timely manner and in the order they are received. Please only flag TRUE URGENT requests as high priority to the pool at this time.   If you have questions - please send a note/message in the active PA encounter and send back to the [facility] PA pool [[id number]].    If you have more specific questions about our process please reach out to our supervisor Fabi Monzon.   Thank you!   RPPA (Retail Pharmacy Prior Authorization) team

## 2024-04-25 ENCOUNTER — LAB (OUTPATIENT)
Dept: LAB | Facility: CLINIC | Age: 54
End: 2024-04-25
Payer: COMMERCIAL

## 2024-04-25 ENCOUNTER — TELEPHONE (OUTPATIENT)
Dept: TRANSPLANT | Facility: CLINIC | Age: 54
End: 2024-04-25

## 2024-04-25 DIAGNOSIS — Z94.4 LIVER REPLACED BY TRANSPLANT (H): ICD-10-CM

## 2024-04-25 LAB
ALBUMIN SERPL BCG-MCNC: 3.4 G/DL (ref 3.5–5.2)
ALP SERPL-CCNC: 115 U/L (ref 40–150)
ALT SERPL W P-5'-P-CCNC: <5 U/L (ref 0–70)
ANION GAP SERPL CALCULATED.3IONS-SCNC: 8 MMOL/L (ref 7–15)
AST SERPL W P-5'-P-CCNC: 17 U/L (ref 0–45)
BILIRUB DIRECT SERPL-MCNC: 0.33 MG/DL (ref 0–0.3)
BILIRUB SERPL-MCNC: 0.6 MG/DL
BUN SERPL-MCNC: 18.3 MG/DL (ref 6–20)
CALCIUM SERPL-MCNC: 9.5 MG/DL (ref 8.6–10)
CHLORIDE SERPL-SCNC: 106 MMOL/L (ref 98–107)
CREAT SERPL-MCNC: 1.21 MG/DL (ref 0.67–1.17)
DEPRECATED HCO3 PLAS-SCNC: 26 MMOL/L (ref 22–29)
EGFRCR SERPLBLD CKD-EPI 2021: 72 ML/MIN/1.73M2
ERYTHROCYTE [DISTWIDTH] IN BLOOD BY AUTOMATED COUNT: 14.9 % (ref 10–15)
GLUCOSE SERPL-MCNC: 159 MG/DL (ref 70–99)
HCT VFR BLD AUTO: 34.2 % (ref 40–53)
HGB BLD-MCNC: 11.2 G/DL (ref 13.3–17.7)
MAGNESIUM SERPL-MCNC: 1.2 MG/DL (ref 1.7–2.3)
MCH RBC QN AUTO: 30.5 PG (ref 26.5–33)
MCHC RBC AUTO-ENTMCNC: 32.7 G/DL (ref 31.5–36.5)
MCV RBC AUTO: 93 FL (ref 78–100)
PHOSPHATE SERPL-MCNC: 4.3 MG/DL (ref 2.5–4.5)
PLATELET # BLD AUTO: 245 10E3/UL (ref 150–450)
POTASSIUM SERPL-SCNC: 4.5 MMOL/L (ref 3.4–5.3)
PROT SERPL-MCNC: 6.3 G/DL (ref 6.4–8.3)
RBC # BLD AUTO: 3.67 10E6/UL (ref 4.4–5.9)
SODIUM SERPL-SCNC: 140 MMOL/L (ref 135–145)
TACROLIMUS BLD-MCNC: 12.1 UG/L (ref 5–15)
TME LAST DOSE: NORMAL H
TME LAST DOSE: NORMAL H
WBC # BLD AUTO: 3.9 10E3/UL (ref 4–11)

## 2024-04-25 PROCEDURE — 82248 BILIRUBIN DIRECT: CPT

## 2024-04-25 PROCEDURE — 85027 COMPLETE CBC AUTOMATED: CPT

## 2024-04-25 PROCEDURE — 36415 COLL VENOUS BLD VENIPUNCTURE: CPT

## 2024-04-25 PROCEDURE — 83735 ASSAY OF MAGNESIUM: CPT

## 2024-04-25 PROCEDURE — 84100 ASSAY OF PHOSPHORUS: CPT

## 2024-04-25 PROCEDURE — 80197 ASSAY OF TACROLIMUS: CPT

## 2024-04-25 PROCEDURE — 80053 COMPREHEN METABOLIC PANEL: CPT

## 2024-04-25 NOTE — TELEPHONE ENCOUNTER
"Unable to get pt in at  [facility] or [facility]. Spoke to the charge nurse at [facility] who can get pt in at 9:30 am. Notified Adina.       Adina states that pt feels like someone is \"digging out his stomach\" Pt states that he has this feeling four times out of the day. Pt doesn't want to eat because of it. Noticed it when the magnesium was changed and mycophenolic acid. So for about a week now.   "

## 2024-04-25 NOTE — TELEPHONE ENCOUNTER
Message for Adina (wife):    Hi Lore!  So.with his Magnesium at 1.2, I'm assuming he will need another infusion?  [facility] is the best option, but wherever he can get in to.  As far as today goes, I am not available to take him anywhere between 2pm-315pm.  I'm teaching a class at 2 and my co- has a maditory meeting so she cannot take over for me.  Just let me know.    Therapy plan placed.

## 2024-04-26 ENCOUNTER — INFUSION THERAPY VISIT (OUTPATIENT)
Dept: INFUSION THERAPY | Facility: CLINIC | Age: 54
End: 2024-04-26
Attending: INTERNAL MEDICINE
Payer: COMMERCIAL

## 2024-04-26 ENCOUNTER — MYC MEDICAL ADVICE (OUTPATIENT)
Dept: TRANSPLANT | Facility: CLINIC | Age: 54
End: 2024-04-26

## 2024-04-26 ENCOUNTER — TELEPHONE (OUTPATIENT)
Dept: FAMILY MEDICINE | Facility: CLINIC | Age: 54
End: 2024-04-26

## 2024-04-26 ENCOUNTER — VIRTUAL VISIT (OUTPATIENT)
Dept: ENDOCRINOLOGY | Facility: CLINIC | Age: 54
End: 2024-04-26
Payer: COMMERCIAL

## 2024-04-26 VITALS — DIASTOLIC BLOOD PRESSURE: 65 MMHG | HEART RATE: 76 BPM | SYSTOLIC BLOOD PRESSURE: 106 MMHG

## 2024-04-26 DIAGNOSIS — Z94.4 LIVER REPLACED BY TRANSPLANT (H): Chronic | ICD-10-CM

## 2024-04-26 DIAGNOSIS — E11.69 DIABETES MELLITUS ASSOCIATED WITH PANCREATIC DISEASE (H): Primary | ICD-10-CM

## 2024-04-26 DIAGNOSIS — E83.42 HYPOMAGNESEMIA: Primary | ICD-10-CM

## 2024-04-26 DIAGNOSIS — K86.9 DIABETES MELLITUS ASSOCIATED WITH PANCREATIC DISEASE (H): Primary | ICD-10-CM

## 2024-04-26 PROCEDURE — 96365 THER/PROPH/DIAG IV INF INIT: CPT

## 2024-04-26 PROCEDURE — 96366 THER/PROPH/DIAG IV INF ADDON: CPT

## 2024-04-26 PROCEDURE — 258N000003 HC RX IP 258 OP 636: Performed by: TRANSPLANT SURGERY

## 2024-04-26 PROCEDURE — 99214 OFFICE O/P EST MOD 30 MIN: CPT | Mod: 95 | Performed by: PHYSICIAN ASSISTANT

## 2024-04-26 PROCEDURE — 250N000011 HC RX IP 250 OP 636: Performed by: TRANSPLANT SURGERY

## 2024-04-26 RX ORDER — TACROLIMUS 1 MG/1
3 CAPSULE ORAL EVERY 12 HOURS
Qty: 540 CAPSULE | Refills: 3 | Status: SHIPPED | OUTPATIENT
Start: 2024-04-26 | End: 2024-04-26

## 2024-04-26 RX ORDER — HEPARIN SODIUM,PORCINE 10 UNIT/ML
5-20 VIAL (ML) INTRAVENOUS DAILY PRN
OUTPATIENT
Start: 2024-04-26

## 2024-04-26 RX ORDER — EPINEPHRINE 1 MG/ML
0.3 INJECTION, SOLUTION, CONCENTRATE INTRAVENOUS EVERY 5 MIN PRN
OUTPATIENT
Start: 2024-04-26

## 2024-04-26 RX ORDER — DIPHENHYDRAMINE HYDROCHLORIDE 50 MG/ML
50 INJECTION INTRAMUSCULAR; INTRAVENOUS
Start: 2024-04-26

## 2024-04-26 RX ORDER — TACROLIMUS 1 MG/1
2 CAPSULE ORAL EVERY 12 HOURS
Qty: 360 CAPSULE | Refills: 3 | Status: SHIPPED | OUTPATIENT
Start: 2024-04-26 | End: 2024-05-03

## 2024-04-26 RX ORDER — HEPARIN SODIUM (PORCINE) LOCK FLUSH IV SOLN 100 UNIT/ML 100 UNIT/ML
5 SOLUTION INTRAVENOUS
OUTPATIENT
Start: 2024-04-26

## 2024-04-26 RX ORDER — ALBUTEROL SULFATE 0.83 MG/ML
2.5 SOLUTION RESPIRATORY (INHALATION)
OUTPATIENT
Start: 2024-04-26

## 2024-04-26 RX ORDER — METHYLPREDNISOLONE SODIUM SUCCINATE 125 MG/2ML
125 INJECTION, POWDER, LYOPHILIZED, FOR SOLUTION INTRAMUSCULAR; INTRAVENOUS
Start: 2024-04-26

## 2024-04-26 RX ORDER — TACROLIMUS 5 MG/1
5 CAPSULE ORAL EVERY 12 HOURS
Qty: 180 CAPSULE | Refills: 3 | Status: SHIPPED | OUTPATIENT
Start: 2024-04-26 | End: 2024-05-03

## 2024-04-26 RX ORDER — ALBUTEROL SULFATE 90 UG/1
1-2 AEROSOL, METERED RESPIRATORY (INHALATION)
Start: 2024-04-26

## 2024-04-26 RX ORDER — MEPERIDINE HYDROCHLORIDE 25 MG/ML
25 INJECTION INTRAMUSCULAR; INTRAVENOUS; SUBCUTANEOUS EVERY 30 MIN PRN
OUTPATIENT
Start: 2024-04-26

## 2024-04-26 RX ORDER — TACROLIMUS 5 MG/1
5 CAPSULE ORAL EVERY 12 HOURS
Qty: 180 CAPSULE | Refills: 3 | Status: SHIPPED | OUTPATIENT
Start: 2024-04-26 | End: 2024-04-26

## 2024-04-26 RX ADMIN — MAGNESIUM SULFATE HEPTAHYDRATE 3 G: 500 INJECTION, SOLUTION INTRAMUSCULAR; INTRAVENOUS at 10:10

## 2024-04-26 RX ADMIN — SODIUM CHLORIDE 250 ML: 9 INJECTION, SOLUTION INTRAVENOUS at 10:08

## 2024-04-26 NOTE — TELEPHONE ENCOUNTER
Adina asking for a standing IV mag infusion order.  Told her this is not a standardly given med.  Will readdress if mag is low on Monday

## 2024-04-26 NOTE — TELEPHONE ENCOUNTER
"Received message yesterday re: \"stomach issues\".  Tac level high.  Call to Adina.  Tac level 12.1, goal 10.  Currently taking 8 mg bid. Asked her to decrease to 7 mg po bid.  Getting IV magnesium today.  He is currently taking mag glycinate 200 mg po bid.  Spoke to her about his stomach issues. - \"Feels weird\".  It's not pain, just feels weird.   Has been requiring iv mag weekly. Reluctant to increase oral dose due to this.  Is already on myfortic.  Adina thinks stomach issues came on when we switched Mary to myfortic.  Will increase   Mag glycinate to 200 mg tid.    Adina told me that next week is \"very busy\" for her and she won't be able to bring Molina in for IV mag and Mary said \"I'm not doing this again\".  I told her that a mag of 1.2 is critical and can have implications but we will see what next week's mag is.   Also reviewed tac level of 12.1.  He is currently taking 8 mg po bid.  I asked her to decrease tacrolimus to 7 mg po bid and repeat on MOnday.  She will do.   "

## 2024-04-26 NOTE — PROGRESS NOTES
Time of start: 2:40 am   Time of end: 2:57 am   Total duration of video visit: 17 minutes.  Providers location: Off-site.  Patient's location: [place].    HPI  Mary Lopez is a 53 year-old male with history of type 2 diabetes mellitus.  Video visit today for diabetes follow-up.  His wife Rosio is present during our video visit today.  Pt has been seen by Liana Garcia RD/JARROD.  Mary was admitted 3/30/2024-4/2/2024 with persistent intermittent sharp pain in RUQ and found to have persistent moderate size fluid collection beneath his right liver lobe.  Patient reports feeling better at this time.  Patient was admitted 3/4/2024-3/21/2024 and underwent liver transplant on 3/5/2024.  He is currently taking prednisone 5 mg daily.  His wife tells me that  Mary will be on this prednisone dose for approximately 1 more month.  No tube feedings at this time.  For his diabetes, patient is currently taking glargine 12 units daily around 1 PM and Humalog 5 units with first meal, 4 units with second meal and 30 units with dinner meal.    Most recent A1C was 5.6 % on 4/1/2024.  Patient is anemic.  I reviewed and scanned his DexcomG7 sensor download data in his note below.  Some overnight hypoglycemia with high postprandial blood sugar values.  On ROS today, no tube feedings at this time.  Oral intake slowly improving.  Quit smoking.  Occasional chews tobacco.  Denies blurred vision, nausea, vomiting, shortness of breath at rest, fever or rigors.  Denies chest pain, abdominal pain, diarrhea, constipation, dysuria and also denies symptoms of diabetic neuropathy.  No foot ulcers.    Diabetes Care  Retinopathy: None per patient.  Nephropathy: Urine microalbuminuria was negative in March 2022.  Neuropathy: None.  Foot Exam: No exam today.  Taking aspirin: Yes.  Lipids:  in March 2022.  Insulin: Basal and mealtime insulin.  DM meds: none.  Testing: DexcomG7 sensor.  Hypoglycemia treatment: Has Gvoke  pen.                      ROS  See under HPI.    Allergies  Allergies   Allergen Reactions    Other Food Allergy Anaphylaxis     Legumes (black beans, baked beans, chickpeas)    Pineapple Itching       Medications  Current Outpatient Medications   Medication Sig Dispense Refill    acetaminophen (TYLENOL) 500 MG tablet Take 1-2 tablets (500-1,000 mg) by mouth every 6 hours as needed for mild pain 30 tablet 0    aspirin (ASA) 325 MG tablet 1 tablet (325 mg) by Oral or Feeding Tube route daily 30 tablet 5    blood glucose (NO BRAND SPECIFIED) test strip Use to test blood sugar two times daily or as directed. To accompany: Blood Glucose Monitor Brands: per insurance. 100 strip 6    blood glucose monitoring (NO BRAND SPECIFIED) meter device kit Use to test blood sugar twice times daily or as directed. Preferred blood glucose meter OR supplies to accompany: Blood Glucose Monitor Brands: per insurance. 1 kit 0    Continuous Blood Gluc Sensor (DEXCOM G7 SENSOR) MISC Change every 10 days. 3 each 5    Continuous Blood Gluc Sensor (DEXCOM G7 SENSOR) MISC Change every 10 days. 3 each 5    fludrocortisone (FLORINEF) 0.1 MG tablet Take 1 tablet (0.1 mg) by mouth daily 30 tablet 0    Glucagon (GVOKE HYPOPEN) 1 MG/0.2ML pen Inject the contents of 1 device under the skin into lower abdomen, outer thigh, or outer upper arm as needed for hypoglycemia. If no response after 15 minutes, additional 1 mg dose from a new device may be injected while waiting for emergency assistance. 0.4 mL 1    insulin aspart (NOVOLOG PEN) 100 UNIT/ML pen Inject 1-10 Units Subcutaneous 3 times daily (before meals) Humalog 4 units with meals plus correction scale 1:50 >140 TID AC and 1:50 >200 HS Pre-meal Humalog correction scale: Do Not give Correction Insulin if Pre-Meal BG less than 140. For Pre-Meal  - 189 give 1 unit. For Pre-Meal  - 239 give 2 units. For Pre-Meal  - 289 give 3 units. For Pre-Meal  - 339 give 4 units. For  Pre-Meal - 399 give 5 units. For Pre-Meal -449 give 6 units For Pre-Meal BG greater than or equal to 450 give 7 units. To be given with prandial insulin, and based on pre-meal blood glucose. Bedtime Humalog correction scale: Do Not give Bedtime Correction Insulin if BG less than 200. For  - 249 give 1 units. For  - 299 give 2 units. For  - 349 give 3 units. For  -399 give 4 units. For BG greater than or equal to 400 give 5 units. Notify provider if glucose greater than or equal to 350 mg/dL after administration of correction dose. 15 mL 1    Insulin Glargine-yfgn (SEMGLEE, YFGN,) 100 UNIT/ML SOLN Inject 5 Units Subcutaneous daily (Patient taking differently: Inject 5 Units Subcutaneous daily As needed)      insulin pen needle (32G X 4 MM) 32G X 4 MM miscellaneous Use as directed by provider Per insurance coverage 200 each 1    insulin pen needle (BD PEN NEEDLE SHERRIE 2ND GEN) 32G X 4 MM miscellaneous USES 5 PER  each 11    magnesium glycinate 100 MG CAPS capsule Take 2 capsules (200 mg) by mouth 2 times daily 120 capsule 2    melatonin 10 MG TABS tablet Take 1 tablet (10 mg) by mouth nightly as needed for sleep      mirtazapine (REMERON) 15 MG tablet Take 2 tablets (30 mg) by mouth at bedtime 60 tablet 0    multivitamin w/minerals (THERA-VIT-M) tablet TAKE 1 TABLET BY MOUTH DAILY 90 tablet 1    mycophenolic acid (GENERIC EQUIVALENT) 180 MG EC tablet Take 3 tablets (540 mg) by mouth 2 times daily 540 tablet 0    omeprazole (PRILOSEC) 20 MG DR capsule Take 1 capsule (20 mg) by mouth 2 times daily 180 capsule 1    predniSONE (DELTASONE) 5 MG tablet Take 1 tablet (5 mg) by mouth daily 90 tablet 3    sulfamethoxazole-trimethoprim (BACTRIM) 400-80 MG tablet Take 1 tablet by mouth every evening 30 tablet 5    tacrolimus (GENERIC EQUIVALENT) 1 MG capsule Take 3 capsules (3 mg) by mouth every 12 hours Total dos: 7mg  capsule 3    tacrolimus (GENERIC EQUIVALENT) 5 MG capsule  Take 1 capsule (5 mg) by mouth every 12 hours Total dose: 7 mg  capsule 3    thin (NO BRAND SPECIFIED) lancets Use with lanceting device. To accompany: Blood Glucose Monitor Brands: per insurance. 100 each 6    traMADol (ULTRAM) 50 MG tablet Take 1 tablet (50 mg) by mouth daily as needed for severe pain 30 tablet 0    ursodiol (ACTIGALL) 300 MG capsule Take 1 capsule (300 mg) by mouth 2 times daily 60 capsule 5    valGANciclovir (VALCYTE) 450 MG tablet Take 1 tablet (450 mg) by mouth every evening 30 tablet 2       Family History  family history includes Anxiety Disorder in his mother; Bipolar Disorder in his mother; Chronic Obstructive Pulmonary Disease in his mother; Depression in his mother; Diabetes in his father; Diabetes Type 2  in his brother; Lung Cancer (age of onset: 81) in his mother; Morbid Obesity in his father; Substance Abuse in his maternal grandfather and paternal grandfather.    Social History   reports that he has quit smoking. His smoking use included cigarettes. He has never been exposed to tobacco smoke. His smokeless tobacco use includes chew. He reports that he does not currently use alcohol after a past usage of about 12.0 standard drinks of alcohol per week. He reports that he does not currently use drugs.     Past Medical History  Past Medical History:   Diagnosis Date    ANGEL (acute kidney injury) (H24)     Alcoholic cirrhosis of liver without ascites (H) 07/13/2023    Alcoholic hepatitis with ascites (H28) 10/03/2023    Alcoholic hepatitis without ascites (H28) 07/13/2023    Closed fracture of one rib of left side 09/14/2023    Concussion without loss of consciousness 03/11/2020    Decompensated hepatic cirrhosis (H) 09/15/2023    Diabetes mellitus, type 2 (H) 11/22/2023    Essential hypertension 03/11/2020    Ganglion cyst of wrist, right 04/18/2024    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)     Liver replaced by transplant (H) 03/05/2024    Liver transplant  rejection (H) 03/15/2024    ACR PRAJAPATI 6-7/9, treated with steroids    Mild hyperlipidemia 2021    Persistent insomnia 2023    Portal hypertension (H) 2023    Scrotal abscess     Secondary esophageal 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 (H) 2023    Tobacco abuse disorder 2020    Type 2 diabetes mellitus with hyperglycemia (H) 2023       Past Surgical History:   Procedure Laterality Date    BENCH LIVER  3/5/2024    Procedure: Bench liver;  Surgeon: Ryder Cortez MD;  Location: [facility] OR    CHOLECYSTECTOMY      COLONOSCOPY N/A 2024    Procedure: COLONOSCOPY, WITH POLYPECTOMY;  Surgeon: Jak Urbina MD;  Location: [facility]    CV RIGHT HEART CATH MEASUREMENTS RECORDED N/A 2024    Procedure: Right Heart Catheterization;  Surgeon: Alferd Tafoya MD;  Location:  [facility] CARD[facility] CATH LAB    IR LIVER BIOPSY PERCUTANEOUS  3/15/2024    IR RETROPERITONEAL ABSCESS DRAINAGE  2024    TONSILLECTOMY      TRANSPLANT LIVER RECIPIENT  DONOR N/A 3/5/2024    Procedure: Transplant liver recipient  donor, bile duct stent placement;  Surgeon: Ryder Cortez MD;  Location: [facility] OR    VASECTOMY         Physical Exam    No exam today.      RESULTS  Creatinine   Date Value Ref Range Status   2024 1.21 (H) 0.67 - 1.17 mg/dL Final   2020 0.95 0.66 - 1.25 mg/dL Final     GFR Estimate   Date Value Ref Range Status   2024 72 >60 mL/min/1.73m2 Final   2020 >90 >60 mL/min/[1.73_m2] Final     Comment:     Non  GFR Calc  Starting 2018, serum creatinine based estimated GFR (eGFR) will be   calculated using the Chronic Kidney Disease Epidemiology Collaboration   (CKD-EPI) equation.       Hemoglobin A1C   Date Value Ref Range Status   2024 5.6 <5.7 % Final     Comment:     Normal <5.7%   Prediabetes 5.7-6.4%    Diabetes 6.5% or higher     Note: Adopted from ADA consensus guidelines.     Potassium   Date Value Ref Range Status   2024  4.5 3.4 - 5.3 mmol/L Final   03/12/2022 3.8 3.4 - 5.3 mmol/L Final   07/05/2020 4.2 3.4 - 5.3 mmol/L Final     Potassium POCT   Date Value Ref Range Status   03/05/2024 4.1 3.4 - 5.3 mmol/L Final     ALT   Date Value Ref Range Status   04/25/2024 <5 0 - 70 U/L Final     Comment:     Reference intervals for this test were updated on 6/12/2023 to more accurately reflect our healthy population. There may be differences in the flagging of prior results with similar values performed with this method. Interpretation of those prior results can be made in the context of the updated reference intervals.     12/19/2006 50 0 - 70 U/L Final     AST   Date Value Ref Range Status   04/25/2024 17 0 - 45 U/L Final     Comment:     Reference intervals for this test were updated on 6/12/2023 to more accurately reflect our healthy population. There may be differences in the flagging of prior results with similar values performed with this method. Interpretation of those prior results can be made in the context of the updated reference intervals.   12/19/2006 52 0 - 55 U/L Final     TSH   Date Value Ref Range Status   03/12/2022 2.18 0.40 - 4.00 mU/L Final       Cholesterol   Date Value Ref Range Status   03/12/2022 203 (H) <200 mg/dL Final   12/04/2021 231 (H) <200 mg/dL Final   07/05/2020 183 <200 mg/dL Final     HDL Cholesterol   Date Value Ref Range Status   07/05/2020 50 >39 mg/dL Final     Direct Measure HDL   Date Value Ref Range Status   03/12/2022 71 >=40 mg/dL Final   12/04/2021 76 >=40 mg/dL Final     LDL Cholesterol Calculated   Date Value Ref Range Status   03/12/2022 102 (H) <=100 mg/dL Final   12/04/2021 120 (H) <=100 mg/dL Final   07/05/2020 87 <100 mg/dL Final     Comment:     Desirable:       <100 mg/dl     Triglycerides   Date Value Ref Range Status   03/12/2022 151 (H) <150 mg/dL Final   12/04/2021 174 (H) <150 mg/dL Final   07/05/2020 229 (H) <150 mg/dL Final     Comment:     Borderline high:  150-199 mg/dl  High:              200-499 mg/dl  Very high:       >499 mg/dl           ASSESSMENT/PLAN:     TYPE 2 DIABETES MELLITUS: 53-year-old male with history of type 2 diabetes currently on a steroid taper following liver transplant on 3/5/2024.  Patient is currently taking prednisone 5 mg each am.  No tube feedings at this time.  Oral intake is minimal.  He has been working on trying to drink more fluids.  Patient wears a DexcomG7 full-time to monitor his blood sugar.  Patient has overnight hypoglycemia and high postprandial blood sugars.  Decrease glargine 12 units subcutaneous daily which he takes around 1 PM and continue NovoLog 5 units with first meal, 4 units with second meal and 3 units with evening meal with NO correction insulin for now.  If patient is able to increase his oral intake and stay well-hydrated, will consider adding Jardiance which should help lower his postprandial blood sugar readings.  Most recent creatinine 1.21 with GFR 72 mL/min on 4/25/2024.  Patient denies any known history of diabetic retinopathy.  Reminded him to have annual diabetic eye exam.  No history of diabetic nephropathy.  Urine microalbuminuria was negative in March 2022.  Patient denies any symptoms of diabetic neuropathy or foot ulcers.  Blood pressure 106/65 on 4/26/2024.  LIVER CIRRHOSIS: S/P liver transplant on 3/5/2024.  Patient on prednisone 5 mg daily.  Followed closely by transplant staff.  FOLLOW UP: With Liana Garcia RD/JARROD on 5/7/2024.  Follow-up with me in 2 to 3 months.    Spent reviewing chart, labs and DexcomG7 sensor data= 5 minutes.  Time for video visit today= 17 minutes.  Time for documentation today= 15 minutes.    Total time for visit today= 37 minutes.    Geovanna Ferreira PA-C

## 2024-04-26 NOTE — PROGRESS NOTES
Infusion Nursing Note:  Mary Lopez presents today for Mag replacement.    Patient seen by provider today: No   present during visit today: Not Applicable.    Note: N/A.      Intravenous Access:  Peripheral IV placed.    Treatment Conditions:  Mg 1.2 on 4/25. .      Post Infusion Assessment:  Patient tolerated infusion without incident.  Site patent and intact, free from redness, edema or discomfort.  No evidence of extravasations.  Access discontinued per protocol.       Discharge Plan:   Discharge instructions reviewed with: Patient.  Patient discharged in stable condition accompanied by: wife.  Departure Mode: Ambulatory.      Iris Lazar RN

## 2024-04-26 NOTE — LETTER
4/26/2024       RE: Mary Lopez  [address]  [place] [address]     Dear Colleague,    Thank you for referring your patient, Mary Loepz, to the [facility] [place] at [facility]. Please see a copy of my visit note below.    Outcome for 04/16/24 9:20 AM: Data uploaded on Dexcom  Margoth Thompson LPN   Outcome for 04/24/24 9:51 AM: Data obtained via Dexcom website  Margoth Thompson LPN       Patient is showing 4/5 [place]CM met. Not on statin   Margoth Thompson LPN            Time of start: 2:40 am   Time of end: 2:57 am   Total duration of video visit: 17 minutes.  Providers location: Off-site.  Patient's location: [place].    HPI  Mary Lopez is a 53 year-old male with history of type 2 diabetes mellitus.  Video visit today for diabetes follow-up.  His wife Rosio is present during our video visit today.  Pt has been seen by Liana Garcia RD/JESUSE.  Mary was admitted 3/30/2024-4/2/2024 with persistent intermittent sharp pain in RUQ and found to have persistent moderate size fluid collection beneath his right liver lobe.  Patient reports feeling better at this time.  Patient was admitted 3/4/2024-3/21/2024 and underwent liver transplant on 3/5/2024.  He is currently taking prednisone 5 mg daily.  His wife tells me that  Mary will be on this prednisone dose for approximately 1 more month.  No tube feedings at this time.  For his diabetes, patient is currently taking glargine 12 units daily around 1 PM and Humalog 5 units with first meal, 4 units with second meal and 30 units with dinner meal.    Most recent A1C was 5.6 % on 4/1/2024.  Patient is anemic.  I reviewed and scanned his DexcomG7 sensor download data in his note below.  Some overnight hypoglycemia with high postprandial blood sugar values.  On ROS today, no tube feedings at this time.  Oral intake slowly improving.  Quit smoking.  Occasional chews  tobacco.  Denies blurred vision, nausea, vomiting, shortness of breath at rest, fever or rigors.  Denies chest pain, abdominal pain, diarrhea, constipation, dysuria and also denies symptoms of diabetic neuropathy.  No foot ulcers.    Diabetes Care  Retinopathy: None per patient.  Nephropathy: Urine microalbuminuria was negative in March 2022.  Neuropathy: None.  Foot Exam: No exam today.  Taking aspirin: Yes.  Lipids:  in March 2022.  Insulin: Basal and mealtime insulin.  DM meds: none.  Testing: DexcomG7 sensor.  Hypoglycemia treatment: Has Gvoke pen.                      ROS  See under HPI.    Allergies  Allergies   Allergen Reactions    Other Food Allergy Anaphylaxis     Legumes (black beans, baked beans, chickpeas)    Pineapple Itching       Medications  Current Outpatient Medications   Medication Sig Dispense Refill    acetaminophen (TYLENOL) 500 MG tablet Take 1-2 tablets (500-1,000 mg) by mouth every 6 hours as needed for mild pain 30 tablet 0    aspirin (ASA) 325 MG tablet 1 tablet (325 mg) by Oral or Feeding Tube route daily 30 tablet 5    blood glucose (NO BRAND SPECIFIED) test strip Use to test blood sugar two times daily or as directed. To accompany: Blood Glucose Monitor Brands: per insurance. 100 strip 6    blood glucose monitoring (NO BRAND SPECIFIED) meter device kit Use to test blood sugar twice times daily or as directed. Preferred blood glucose meter OR supplies to accompany: Blood Glucose Monitor Brands: per insurance. 1 kit 0    Continuous Blood Gluc Sensor (DEXCOM G7 SENSOR) MISC Change every 10 days. 3 each 5    Continuous Blood Gluc Sensor (DEXCOM G7 SENSOR) MISC Change every 10 days. 3 each 5    fludrocortisone (FLORINEF) 0.1 MG tablet Take 1 tablet (0.1 mg) by mouth daily 30 tablet 0    Glucagon (GVOKE HYPOPEN) 1 MG/0.2ML pen Inject the contents of 1 device under the skin into lower abdomen, outer thigh, or outer upper arm as needed for hypoglycemia. If no response after 15 minutes,  additional 1 mg dose from a new device may be injected while waiting for emergency assistance. 0.4 mL 1    insulin aspart (NOVOLOG PEN) 100 UNIT/ML pen Inject 1-10 Units Subcutaneous 3 times daily (before meals) Humalog 4 units with meals plus correction scale 1:50 >140 TID AC and 1:50 >200 HS Pre-meal Humalog correction scale: Do Not give Correction Insulin if Pre-Meal BG less than 140. For Pre-Meal  - 189 give 1 unit. For Pre-Meal  - 239 give 2 units. For Pre-Meal  - 289 give 3 units. For Pre-Meal  - 339 give 4 units. For Pre-Meal - 399 give 5 units. For Pre-Meal -449 give 6 units For Pre-Meal BG greater than or equal to 450 give 7 units. To be given with prandial insulin, and based on pre-meal blood glucose. Bedtime Humalog correction scale: Do Not give Bedtime Correction Insulin if BG less than 200. For  - 249 give 1 units. For  - 299 give 2 units. For  - 349 give 3 units. For  -399 give 4 units. For BG greater than or equal to 400 give 5 units. Notify provider if glucose greater than or equal to 350 mg/dL after administration of correction dose. 15 mL 1    Insulin Glargine-yfgn (SEMGLEE, YFGN,) 100 UNIT/ML SOLN Inject 5 Units Subcutaneous daily (Patient taking differently: Inject 5 Units Subcutaneous daily As needed)      insulin pen needle (32G X 4 MM) 32G X 4 MM miscellaneous Use as directed by provider Per insurance coverage 200 each 1    insulin pen needle (BD PEN NEEDLE SHERRIE 2ND GEN) 32G X 4 MM miscellaneous USES 5 PER  each 11    magnesium glycinate 100 MG CAPS capsule Take 2 capsules (200 mg) by mouth 2 times daily 120 capsule 2    melatonin 10 MG TABS tablet Take 1 tablet (10 mg) by mouth nightly as needed for sleep      mirtazapine (REMERON) 15 MG tablet Take 2 tablets (30 mg) by mouth at bedtime 60 tablet 0    multivitamin w/minerals (THERA-VIT-M) tablet TAKE 1 TABLET BY MOUTH DAILY 90 tablet 1    mycophenolic acid (GENERIC EQUIVALENT)  180 MG EC tablet Take 3 tablets (540 mg) by mouth 2 times daily 540 tablet 0    omeprazole (PRILOSEC) 20 MG DR capsule Take 1 capsule (20 mg) by mouth 2 times daily 180 capsule 1    predniSONE (DELTASONE) 5 MG tablet Take 1 tablet (5 mg) by mouth daily 90 tablet 3    sulfamethoxazole-trimethoprim (BACTRIM) 400-80 MG tablet Take 1 tablet by mouth every evening 30 tablet 5    tacrolimus (GENERIC EQUIVALENT) 1 MG capsule Take 3 capsules (3 mg) by mouth every 12 hours Total dos: 7mg  capsule 3    tacrolimus (GENERIC EQUIVALENT) 5 MG capsule Take 1 capsule (5 mg) by mouth every 12 hours Total dose: 7 mg  capsule 3    thin (NO BRAND SPECIFIED) lancets Use with lanceting device. To accompany: Blood Glucose Monitor Brands: per insurance. 100 each 6    traMADol (ULTRAM) 50 MG tablet Take 1 tablet (50 mg) by mouth daily as needed for severe pain 30 tablet 0    ursodiol (ACTIGALL) 300 MG capsule Take 1 capsule (300 mg) by mouth 2 times daily 60 capsule 5    valGANciclovir (VALCYTE) 450 MG tablet Take 1 tablet (450 mg) by mouth every evening 30 tablet 2       Family History  family history includes Anxiety Disorder in his mother; Bipolar Disorder in his mother; Chronic Obstructive Pulmonary Disease in his mother; Depression in his mother; Diabetes in his father; Diabetes Type 2  in his brother; Lung Cancer (age of onset: 81) in his mother; Morbid Obesity in his father; Substance Abuse in his maternal grandfather and paternal grandfather.    Social History   reports that he has quit smoking. His smoking use included cigarettes. He has never been exposed to tobacco smoke. His smokeless tobacco use includes chew. He reports that he does not currently use alcohol after a past usage of about 12.0 standard drinks of alcohol per week. He reports that he does not currently use drugs.     Past Medical History  Past Medical History:   Diagnosis Date    ANGEL (acute kidney injury) (H24)     Alcoholic cirrhosis of liver without  ascites (H) 2023    Alcoholic hepatitis with ascites (H28) 10/03/2023    Alcoholic hepatitis without ascites (H28) 2023    Closed fracture of one rib of left side 2023    Concussion without loss of consciousness 2020    Decompensated hepatic cirrhosis (H) 09/15/2023    Diabetes mellitus, type 2 (H) 2023    Essential hypertension 2020    Ganglion cyst of wrist, right 2024    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)     Liver replaced by transplant (H) 2024    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    ACR PRAJAPATI 6-7, treated with steroids    Mild hyperlipidemia 2021    Persistent insomnia 2023    Portal hypertension (H) 2023    Scrotal abscess     Secondary esophageal 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 (H) 2023    Tobacco abuse disorder 2020    Type 2 diabetes mellitus with hyperglycemia (H) 2023       Past Surgical History:   Procedure Laterality Date    BENCH LIVER  3/5/2024    Procedure: Bench liver;  Surgeon: Ryder Cortez MD;  Location: [facility] OR    CHOLECYSTECTOMY      COLONOSCOPY N/A 2024    Procedure: COLONOSCOPY, WITH POLYPECTOMY;  Surgeon: Jak Urbina MD;  Location: [facility]    CV RIGHT HEART CATH MEASUREMENTS RECORDED N/A 2024    Procedure: Right Heart Catheterization;  Surgeon: Alfred Tafoya MD;  [facility]    IR LIVER BIOPSY PERCUTANEOUS  3/15/2024    IR RETROPERITONEAL ABSCESS DRAINAGE  2024    TONSILLECTOMY      TRANSPLANT LIVER RECIPIENT  DONOR N/A 3/5/2024    Procedure: Transplant liver recipient  donor, bile duct stent placement;  Surgeon: Ryder Cortez MD;  Location: [facility] OR    VASECTOMY         Physical Exam    No exam today.      RESULTS  Creatinine   Date Value Ref Range Status   2024 1.21 (H) 0.67 - 1.17 mg/dL Final   2020 0.95 0.66 - 1.25 mg/dL Final     GFR Estimate   Date Value Ref Range Status   2024 72 >60  mL/min/1.73m2 Final   07/05/2020 >90 >60 mL/min/[1.73_m2] Final     Comment:     Non  GFR Calc  Starting 12/18/2018, serum creatinine based estimated GFR (eGFR) will be   calculated using the Chronic Kidney Disease Epidemiology Collaboration   (CKD-EPI) equation.       Hemoglobin A1C   Date Value Ref Range Status   04/01/2024 5.6 <5.7 % Final     Comment:     Normal <5.7%   Prediabetes 5.7-6.4%    Diabetes 6.5% or higher     Note: Adopted from ADA consensus guidelines.     Potassium   Date Value Ref Range Status   04/25/2024 4.5 3.4 - 5.3 mmol/L Final   03/12/2022 3.8 3.4 - 5.3 mmol/L Final   07/05/2020 4.2 3.4 - 5.3 mmol/L Final     Potassium POCT   Date Value Ref Range Status   03/05/2024 4.1 3.4 - 5.3 mmol/L Final     ALT   Date Value Ref Range Status   04/25/2024 <5 0 - 70 U/L Final     Comment:     Reference intervals for this test were updated on 6/12/2023 to more accurately reflect our healthy population. There may be differences in the flagging of prior results with similar values performed with this method. Interpretation of those prior results can be made in the context of the updated reference intervals.     12/19/2006 50 0 - 70 U/L Final     AST   Date Value Ref Range Status   04/25/2024 17 0 - 45 U/L Final     Comment:     Reference intervals for this test were updated on 6/12/2023 to more accurately reflect our healthy population. There may be differences in the flagging of prior results with similar values performed with this method. Interpretation of those prior results can be made in the context of the updated reference intervals.   12/19/2006 52 0 - 55 U/L Final     TSH   Date Value Ref Range Status   03/12/2022 2.18 0.40 - 4.00 mU/L Final       Cholesterol   Date Value Ref Range Status   03/12/2022 203 (H) <200 mg/dL Final   12/04/2021 231 (H) <200 mg/dL Final   07/05/2020 183 <200 mg/dL Final     HDL Cholesterol   Date Value Ref Range Status   07/05/2020 50 >39 mg/dL Final      Direct Measure HDL   Date Value Ref Range Status   03/12/2022 71 >=40 mg/dL Final   12/04/2021 76 >=40 mg/dL Final     LDL Cholesterol Calculated   Date Value Ref Range Status   03/12/2022 102 (H) <=100 mg/dL Final   12/04/2021 120 (H) <=100 mg/dL Final   07/05/2020 87 <100 mg/dL Final     Comment:     Desirable:       <100 mg/dl     Triglycerides   Date Value Ref Range Status   03/12/2022 151 (H) <150 mg/dL Final   12/04/2021 174 (H) <150 mg/dL Final   07/05/2020 229 (H) <150 mg/dL Final     Comment:     Borderline high:  150-199 mg/dl  High:             200-499 mg/dl  Very high:       >499 mg/dl           ASSESSMENT/PLAN:     TYPE 2 DIABETES MELLITUS: 53-year-old male with history of type 2 diabetes currently on a steroid taper following liver transplant on 3/5/2024.  Patient is currently taking prednisone 5 mg each am.  No tube feedings at this time.  Oral intake is minimal.  He has been working on trying to drink more fluids.  Patient wears a DexcomG7 full-time to monitor his blood sugar.  Patient has overnight hypoglycemia and high postprandial blood sugars.  Decrease glargine 12 units subcutaneous daily which he takes around 1 PM and continue NovoLog 5 units with first meal, 4 units with second meal and 3 units with evening meal with NO correction insulin for now.  If patient is able to increase his oral intake and stay well-hydrated, will consider adding Jardiance which should help lower his postprandial blood sugar readings.  Most recent creatinine 1.21 with GFR 72 mL/min on 4/25/2024.  Patient denies any known history of diabetic retinopathy.  Reminded him to have annual diabetic eye exam.  No history of diabetic nephropathy.  Urine microalbuminuria was negative in March 2022.  Patient denies any symptoms of diabetic neuropathy or foot ulcers.  Blood pressure 106/65 on 4/26/2024.  LIVER CIRRHOSIS: S/P liver transplant on 3/5/2024.  Patient on prednisone 5 mg daily.  Followed closely by transplant  staff.  FOLLOW UP: With Liana Garcia RD/JARROD on 5/7/2024.  Follow-up with me in 2 to 3 months.    Spent reviewing chart, labs and DexcomG7 sensor data= 5 minutes.  Time for video visit today= 17 minutes.  Time for documentation today= 15 minutes.    Total time for visit today= 37 minutes.    Geovanna Ferreira PA-C

## 2024-04-26 NOTE — TELEPHONE ENCOUNTER
Forms/Letter Request    Type of form/letter: Nursing Home/Assisted Living Orders      Do we have the form/letter: Yes: [facility], Order Number: [id number]    Who is the form from? Home care    Where did/will the form come from? form was faxed in    When is form/letter needed by: 5-7 days    How would you like the form/letter returned: Fax : [phone]

## 2024-04-26 NOTE — NURSING NOTE
Is the patient currently in the state of [place]? YES    Visit mode:VIDEO    If the visit is dropped, the patient can be reconnected by: VIDEO VISIT: Text to cell phone:   Telephone Information:       Mobile [phone]       Will anyone else be joining the visit? Wife will also be joining.  (If patient encounters technical issues they should call [phone]    How would you like to obtain your AVS? MyChart    Are changes needed to the allergy or medication list? Information was reviewed earlier today at previous appointment. VF did not review again with patient/spouse.  E-check in was also completed before appointment, so information was reviewed at that time as well.   Are refills needed on medications prescribed by this physician? Unknown    Reason for visit: RECHECK    Autumn CHRISTY

## 2024-04-29 ENCOUNTER — LAB (OUTPATIENT)
Dept: LAB | Facility: CLINIC | Age: 54
End: 2024-04-29
Payer: COMMERCIAL

## 2024-04-29 DIAGNOSIS — Z94.4 LIVER REPLACED BY TRANSPLANT (H): ICD-10-CM

## 2024-04-29 LAB
ALBUMIN SERPL BCG-MCNC: 3.5 G/DL (ref 3.5–5.2)
ALP SERPL-CCNC: 123 U/L (ref 40–150)
ALT SERPL W P-5'-P-CCNC: 11 U/L (ref 0–70)
ANION GAP SERPL CALCULATED.3IONS-SCNC: 6 MMOL/L (ref 7–15)
AST SERPL W P-5'-P-CCNC: 18 U/L (ref 0–45)
BILIRUB DIRECT SERPL-MCNC: 0.29 MG/DL (ref 0–0.3)
BILIRUB SERPL-MCNC: 0.5 MG/DL
BUN SERPL-MCNC: 18.6 MG/DL (ref 6–20)
CALCIUM SERPL-MCNC: 9.7 MG/DL (ref 8.6–10)
CHLORIDE SERPL-SCNC: 105 MMOL/L (ref 98–107)
CREAT SERPL-MCNC: 1.19 MG/DL (ref 0.67–1.17)
DEPRECATED HCO3 PLAS-SCNC: 29 MMOL/L (ref 22–29)
EGFRCR SERPLBLD CKD-EPI 2021: 73 ML/MIN/1.73M2
ERYTHROCYTE [DISTWIDTH] IN BLOOD BY AUTOMATED COUNT: 14.7 % (ref 10–15)
GLUCOSE SERPL-MCNC: 200 MG/DL (ref 70–99)
HCT VFR BLD AUTO: 35.1 % (ref 40–53)
HGB BLD-MCNC: 11.5 G/DL (ref 13.3–17.7)
MAGNESIUM SERPL-MCNC: 1.7 MG/DL (ref 1.7–2.3)
MCH RBC QN AUTO: 30.3 PG (ref 26.5–33)
MCHC RBC AUTO-ENTMCNC: 32.8 G/DL (ref 31.5–36.5)
MCV RBC AUTO: 92 FL (ref 78–100)
PHOSPHATE SERPL-MCNC: 3.9 MG/DL (ref 2.5–4.5)
PLATELET # BLD AUTO: 240 10E3/UL (ref 150–450)
POTASSIUM SERPL-SCNC: 4.7 MMOL/L (ref 3.4–5.3)
PROT SERPL-MCNC: 6.4 G/DL (ref 6.4–8.3)
RBC # BLD AUTO: 3.8 10E6/UL (ref 4.4–5.9)
SODIUM SERPL-SCNC: 140 MMOL/L (ref 135–145)
TACROLIMUS BLD-MCNC: 9.5 UG/L (ref 5–15)
TME LAST DOSE: NORMAL H
TME LAST DOSE: NORMAL H
WBC # BLD AUTO: 3.4 10E3/UL (ref 4–11)

## 2024-04-29 PROCEDURE — 82248 BILIRUBIN DIRECT: CPT

## 2024-04-29 PROCEDURE — 83735 ASSAY OF MAGNESIUM: CPT

## 2024-04-29 PROCEDURE — 80053 COMPREHEN METABOLIC PANEL: CPT

## 2024-04-29 PROCEDURE — 80197 ASSAY OF TACROLIMUS: CPT

## 2024-04-29 PROCEDURE — 84100 ASSAY OF PHOSPHORUS: CPT

## 2024-04-29 PROCEDURE — 36415 COLL VENOUS BLD VENIPUNCTURE: CPT

## 2024-04-29 PROCEDURE — 85027 COMPLETE CBC AUTOMATED: CPT

## 2024-04-30 ENCOUNTER — MEDICAL CORRESPONDENCE (OUTPATIENT)
Dept: HEALTH INFORMATION MANAGEMENT | Facility: CLINIC | Age: 54
End: 2024-04-30

## 2024-05-01 DIAGNOSIS — Z94.4 LIVER TRANSPLANTED (H): Primary | ICD-10-CM

## 2024-05-02 ENCOUNTER — LAB (OUTPATIENT)
Dept: LAB | Facility: CLINIC | Age: 54
End: 2024-05-02
Payer: COMMERCIAL

## 2024-05-02 DIAGNOSIS — Z94.4 LIVER REPLACED BY TRANSPLANT (H): ICD-10-CM

## 2024-05-02 LAB
ALBUMIN SERPL BCG-MCNC: 3.4 G/DL (ref 3.5–5.2)
ALP SERPL-CCNC: 109 U/L (ref 40–150)
ALT SERPL W P-5'-P-CCNC: 7 U/L (ref 0–70)
ANION GAP SERPL CALCULATED.3IONS-SCNC: 7 MMOL/L (ref 7–15)
AST SERPL W P-5'-P-CCNC: 18 U/L (ref 0–45)
BILIRUB DIRECT SERPL-MCNC: 0.32 MG/DL (ref 0–0.3)
BILIRUB SERPL-MCNC: 0.5 MG/DL
BUN SERPL-MCNC: 17.3 MG/DL (ref 6–20)
CALCIUM SERPL-MCNC: 9.6 MG/DL (ref 8.6–10)
CHLORIDE SERPL-SCNC: 103 MMOL/L (ref 98–107)
CREAT SERPL-MCNC: 1.11 MG/DL (ref 0.67–1.17)
DEPRECATED HCO3 PLAS-SCNC: 28 MMOL/L (ref 22–29)
EGFRCR SERPLBLD CKD-EPI 2021: 80 ML/MIN/1.73M2
ERYTHROCYTE [DISTWIDTH] IN BLOOD BY AUTOMATED COUNT: 14.8 % (ref 10–15)
GLUCOSE SERPL-MCNC: 174 MG/DL (ref 70–99)
HCT VFR BLD AUTO: 33.8 % (ref 40–53)
HGB BLD-MCNC: 11.1 G/DL (ref 13.3–17.7)
MAGNESIUM SERPL-MCNC: 1.9 MG/DL (ref 1.7–2.3)
MCH RBC QN AUTO: 30.4 PG (ref 26.5–33)
MCHC RBC AUTO-ENTMCNC: 32.8 G/DL (ref 31.5–36.5)
MCV RBC AUTO: 93 FL (ref 78–100)
PHOSPHATE SERPL-MCNC: 4.1 MG/DL (ref 2.5–4.5)
PLATELET # BLD AUTO: 230 10E3/UL (ref 150–450)
POTASSIUM SERPL-SCNC: 4.2 MMOL/L (ref 3.4–5.3)
PROT SERPL-MCNC: 6.3 G/DL (ref 6.4–8.3)
RBC # BLD AUTO: 3.65 10E6/UL (ref 4.4–5.9)
SODIUM SERPL-SCNC: 138 MMOL/L (ref 135–145)
TACROLIMUS BLD-MCNC: 7.8 UG/L (ref 5–15)
TME LAST DOSE: NORMAL H
TME LAST DOSE: NORMAL H
WBC # BLD AUTO: 3.1 10E3/UL (ref 4–11)

## 2024-05-02 PROCEDURE — 80197 ASSAY OF TACROLIMUS: CPT

## 2024-05-02 PROCEDURE — 82248 BILIRUBIN DIRECT: CPT

## 2024-05-02 PROCEDURE — 36415 COLL VENOUS BLD VENIPUNCTURE: CPT

## 2024-05-02 PROCEDURE — 85027 COMPLETE CBC AUTOMATED: CPT

## 2024-05-02 PROCEDURE — 80053 COMPREHEN METABOLIC PANEL: CPT

## 2024-05-02 PROCEDURE — 83735 ASSAY OF MAGNESIUM: CPT

## 2024-05-02 PROCEDURE — 84100 ASSAY OF PHOSPHORUS: CPT

## 2024-05-03 ENCOUNTER — HOSPITAL ENCOUNTER (OUTPATIENT)
Dept: MRI IMAGING | Facility: CLINIC | Age: 54
Discharge: HOME | End: 2024-05-03
Attending: STUDENT IN AN ORGANIZED HEALTH CARE EDUCATION/TRAINING PROGRAM
Payer: COMMERCIAL

## 2024-05-03 DIAGNOSIS — Z94.4 LIVER REPLACED BY TRANSPLANT (H): Chronic | ICD-10-CM

## 2024-05-03 DIAGNOSIS — M75.42 ROTATOR CUFF IMPINGEMENT SYNDROME OF LEFT SHOULDER: ICD-10-CM

## 2024-05-03 PROCEDURE — 73221 MRI JOINT UPR EXTREM W/O DYE: CPT | Mod: LT

## 2024-05-03 PROCEDURE — 73221 MRI JOINT UPR EXTREM W/O DYE: CPT | Mod: 26 | Performed by: RADIOLOGY

## 2024-05-03 RX ORDER — TACROLIMUS 5 MG/1
5 CAPSULE ORAL EVERY 12 HOURS
Qty: 180 CAPSULE | Refills: 3 | Status: SHIPPED | OUTPATIENT
Start: 2024-05-03 | End: 2024-05-08

## 2024-05-03 RX ORDER — TACROLIMUS 1 MG/1
3 CAPSULE ORAL EVERY 12 HOURS
Qty: 540 CAPSULE | Refills: 3 | Status: SHIPPED | OUTPATIENT
Start: 2024-05-03 | End: 2024-05-08

## 2024-05-04 ENCOUNTER — MYC REFILL (OUTPATIENT)
Dept: TRANSPLANT | Facility: CLINIC | Age: 54
End: 2024-05-04
Payer: COMMERCIAL

## 2024-05-04 DIAGNOSIS — Z94.4 LIVER REPLACED BY TRANSPLANT (H): ICD-10-CM

## 2024-05-06 ENCOUNTER — LAB (OUTPATIENT)
Dept: LAB | Facility: CLINIC | Age: 54
End: 2024-05-06
Payer: COMMERCIAL

## 2024-05-06 DIAGNOSIS — E11.69 DIABETES MELLITUS ASSOCIATED WITH PANCREATIC DISEASE (H): ICD-10-CM

## 2024-05-06 DIAGNOSIS — K86.9 DIABETES MELLITUS ASSOCIATED WITH PANCREATIC DISEASE (H): ICD-10-CM

## 2024-05-06 DIAGNOSIS — Z94.4 LIVER REPLACED BY TRANSPLANT (H): ICD-10-CM

## 2024-05-06 LAB
ALBUMIN SERPL BCG-MCNC: 3.6 G/DL (ref 3.5–5.2)
ALP SERPL-CCNC: 123 U/L (ref 40–150)
ALT SERPL W P-5'-P-CCNC: 6 U/L (ref 0–70)
ANION GAP SERPL CALCULATED.3IONS-SCNC: 6 MMOL/L (ref 7–15)
AST SERPL W P-5'-P-CCNC: 20 U/L (ref 0–45)
BILIRUB DIRECT SERPL-MCNC: 0.28 MG/DL (ref 0–0.3)
BILIRUB SERPL-MCNC: 0.5 MG/DL
BUN SERPL-MCNC: 16.7 MG/DL (ref 6–20)
CALCIUM SERPL-MCNC: 9.6 MG/DL (ref 8.6–10)
CHLORIDE SERPL-SCNC: 103 MMOL/L (ref 98–107)
CREAT SERPL-MCNC: 1.13 MG/DL (ref 0.67–1.17)
DEPRECATED HCO3 PLAS-SCNC: 30 MMOL/L (ref 22–29)
EGFRCR SERPLBLD CKD-EPI 2021: 78 ML/MIN/1.73M2
ERYTHROCYTE [DISTWIDTH] IN BLOOD BY AUTOMATED COUNT: 14.6 % (ref 10–15)
FASTING STATUS PATIENT QL REPORTED: NO
GLUCOSE SERPL-MCNC: 265 MG/DL (ref 70–99)
GLUCOSE SERPL-MCNC: 265 MG/DL (ref 70–99)
HCT VFR BLD AUTO: 35.1 % (ref 40–53)
HGB BLD-MCNC: 11.3 G/DL (ref 13.3–17.7)
MAGNESIUM SERPL-MCNC: 1.5 MG/DL (ref 1.7–2.3)
MCH RBC QN AUTO: 30 PG (ref 26.5–33)
MCHC RBC AUTO-ENTMCNC: 32.2 G/DL (ref 31.5–36.5)
MCV RBC AUTO: 93 FL (ref 78–100)
PHOSPHATE SERPL-MCNC: 4 MG/DL (ref 2.5–4.5)
PLATELET # BLD AUTO: 221 10E3/UL (ref 150–450)
POTASSIUM SERPL-SCNC: 4.8 MMOL/L (ref 3.4–5.3)
PROT SERPL-MCNC: 6.4 G/DL (ref 6.4–8.3)
RBC # BLD AUTO: 3.77 10E6/UL (ref 4.4–5.9)
SODIUM SERPL-SCNC: 139 MMOL/L (ref 135–145)
TACROLIMUS BLD-MCNC: 12.3 UG/L (ref 5–15)
TME LAST DOSE: NORMAL H
TME LAST DOSE: NORMAL H
WBC # BLD AUTO: 2.7 10E3/UL (ref 4–11)

## 2024-05-06 PROCEDURE — 36415 COLL VENOUS BLD VENIPUNCTURE: CPT

## 2024-05-06 PROCEDURE — 80197 ASSAY OF TACROLIMUS: CPT

## 2024-05-06 PROCEDURE — 84100 ASSAY OF PHOSPHORUS: CPT

## 2024-05-06 PROCEDURE — 84681 ASSAY OF C-PEPTIDE: CPT

## 2024-05-06 PROCEDURE — 80053 COMPREHEN METABOLIC PANEL: CPT

## 2024-05-06 PROCEDURE — 82248 BILIRUBIN DIRECT: CPT

## 2024-05-06 PROCEDURE — 85027 COMPLETE CBC AUTOMATED: CPT

## 2024-05-06 PROCEDURE — 83735 ASSAY OF MAGNESIUM: CPT

## 2024-05-06 RX ORDER — FLUDROCORTISONE ACETATE 0.1 MG/1
0.1 TABLET ORAL DAILY
Qty: 30 TABLET | Refills: 3 | Status: ON HOLD | OUTPATIENT
Start: 2024-05-06 | End: 2024-06-23

## 2024-05-06 NOTE — TELEPHONE ENCOUNTER
PRIOR AUTHORIZATION DENIED    Medication: RAMELTEON 8 MG PO TABS    Insurance Company: [employer] - Phone [phone] Fax [phone]    Denial Date: 5/6/2024    Denial Reason(s): Excluded

## 2024-05-07 ENCOUNTER — TELEPHONE (OUTPATIENT)
Dept: FAMILY MEDICINE | Facility: CLINIC | Age: 54
End: 2024-05-07

## 2024-05-07 ENCOUNTER — ALLIED HEALTH/NURSE VISIT (OUTPATIENT)
Dept: EDUCATION SERVICES | Facility: CLINIC | Age: 54
End: 2024-05-07
Payer: COMMERCIAL

## 2024-05-07 DIAGNOSIS — Z79.4 TYPE 2 DIABETES MELLITUS WITHOUT COMPLICATION, WITH LONG-TERM CURRENT USE OF INSULIN (H): Primary | Chronic | ICD-10-CM

## 2024-05-07 DIAGNOSIS — E11.9 TYPE 2 DIABETES MELLITUS WITHOUT COMPLICATION, WITH LONG-TERM CURRENT USE OF INSULIN (H): Primary | Chronic | ICD-10-CM

## 2024-05-07 DIAGNOSIS — Z94.4 LIVER REPLACED BY TRANSPLANT (H): Chronic | ICD-10-CM

## 2024-05-07 LAB — C PEPTIDE SERPL-MCNC: 0.6 NG/ML (ref 0.9–6.9)

## 2024-05-07 PROCEDURE — G0108 DIAB MANAGE TRN  PER INDIV: HCPCS | Performed by: NUTRITIONIST

## 2024-05-07 NOTE — TELEPHONE ENCOUNTER
Forms/Letter Request    Type of form/letter: FMLA - Unknown  Do we have the form/letter: Yes: Will place form in provider bin     Who is the form from? [facility]     Where did/will the form come from? form was faxed in    When is form/letter needed by: asap    How would you like the form/letter returned: Fax : [phone]

## 2024-05-07 NOTE — TELEPHONE ENCOUNTER
Left a message for Mary as his wife's phone is not working.   Left a message to change tacro to 7mg BID, weekly labs now and confirm.

## 2024-05-07 NOTE — PROGRESS NOTES
Diabetes Self-Management Education & Support:  Pre Pump Education    SUBJECTIVE:  Mary Lopez presents today for education related to planning for an insulin pump related to Type 2 diabetes    Patient is being treated with:  intensive insulin program  He is accompanied by spouse    EDUCATION PROVIDED TODAY  Explained the way an insulin pump works, in terms that described the function of a basal rate and a bolus calculator.    Demonstrated the operation of the main pumps on the market right now: Omnipod 5, Tandem t:slim X2 and Mobi with Control-IQ, Beta Bionics iLet, and Medtronic MiniMed 780G.    Explained the unique features of each pump.      Explained that having a pump does not take the place of checking his blood glucoses, counting carbs, or remembering to push the button to deliver the bolus.   Described how the insulin delivers insulin through a cannula inserted under the skin and attached to the pump itself, or in the case of Omnipod, controlled wirelessly via blue tooth pairing with the Personal Diabetes Manager (PDM) / Controller.     Reviewed some of the increased risks associated with using a pump, i.e. DKA, especially if not checking BG at least 3-4 times daily or using a continuous glucose monitor (CGM).    Explained in general terms the costs associated with using an insulin pump, including the disposables and insulin.  Explained the process for obtaining a pump:  Approval by diabetes management team, application to the pump company, approval by insurance company, and necessary pre-training, and post-pump follow-up.    Explained the need to follow up on a regular basis with diabetes care team in order to continue to have insulin pumping supplies approved by insurance.     PLAN:  See Patient Instructions, AVS printed and provided to patient today.  Patient will look more into pumps/do some reading on the two pumps he is most interested in and follow up with me    Follow-up:    scheduled    Time Spent: 45 minutes  Encounter Type: Individual     Any diabetes medication dose changes were made via the CDE Protocol Collaborative Practice Agreement with referring provider.  A copy of this encounter was provided to patient's referring provider.

## 2024-05-07 NOTE — TELEPHONE ENCOUNTER
ISSUE:   Tacrolimus IR level 12.3 on 5/6, goal 10, dose 8 mg BID.    PLAN:   Call Patient and confirm this was an accurate 12-hour trough.   Verify Tacrolimus IR dose.  Confirm no new medications or or missed doses.   Confirm no new illness / infection / diarrhea.   If accurate trough and accurate dose, decrease Tacrolimus IR dose to 7 mg BID     Repeat  labs (now weekly) on Monday, 5/13.  Lore Mckeon RN     OUTCOME:   Spoke with Rosio, they confirm accurate trough level and current dose 8 mg BID.   Rosio confirmed dose change to 7 mg BID.  Rosio agreed to repeat labs in 1 weeks.   Orders sent to [facility] pharmacy for dose change and lab for repeat labs.   Rosio voiced understanding of plan.      Valentina Enamorado LPN

## 2024-05-08 ENCOUNTER — TELEPHONE (OUTPATIENT)
Dept: TRANSPLANT | Facility: CLINIC | Age: 54
End: 2024-05-08
Payer: COMMERCIAL

## 2024-05-08 RX ORDER — TACROLIMUS 1 MG/1
2 CAPSULE ORAL EVERY 12 HOURS
Qty: 360 CAPSULE | Refills: 3 | Status: SHIPPED | OUTPATIENT
Start: 2024-05-08 | End: 2024-05-14

## 2024-05-08 RX ORDER — TACROLIMUS 5 MG/1
5 CAPSULE ORAL EVERY 12 HOURS
Qty: 180 CAPSULE | Refills: 3 | Status: SHIPPED | OUTPATIENT
Start: 2024-05-08 | End: 2024-05-14

## 2024-05-08 NOTE — TELEPHONE ENCOUNTER
"Call to Adina to check in.  Mary fell in October x 2 when he tripped over a chair.  He still has a lot of pain - has tears in both of his shoulders.  This is painful for him and is preventing him from sleeping well.  He doesn't to be on pain meds, tramadol helps a bit but doesn't make him comfortable.    He is moving around a lot more.  He is getting stronger.  He is eating better, appetite is getting much better.   Tac level this week was 12.3, we decreased his tac dose.  Adina says his \"logical thinking still isn't there\".  For example he couldn't figure out how to plug the extension cord into his \".  I suggested that his sleep, pain and general surgical recovery can take longer that the 2 mos that he's had.  Suggested given him more time, assessing w/ PCP and a little further down the line.    Home care suggested speech therapy - she will check w/ homecare about this.    Talked about weaning of pred and myfortic at 3 mos.    Denies other questions or concerns.   "

## 2024-05-09 ENCOUNTER — TELEPHONE (OUTPATIENT)
Dept: TRANSPLANT | Facility: CLINIC | Age: 54
End: 2024-05-09

## 2024-05-09 NOTE — TELEPHONE ENCOUNTER
Post Liver Transplant Team Conference  Date: 5/9/2024  Transplant Coordinator: Lore Mckeon  Transplant Surgeon: Ryder Cortez      Attendees:  [] Dr. Garcia [x] Zoe Mckenzie, RN []       [x] Dr. Cortez [x] Valentina Enamorado LPN []    [] Dr. Sharp [] Evelyn Don, BRIDGET []    [] Dr. Goldman [] Vicki Mccord, PIYUSH []    [] DR. Cash [] Vicki Bruno, BRIDGET []       [] Dr. Bailey [x] Judy Galeana, BRIDGET []       [] Dr. Flavio Lynne Amadeo [x] Lore Mckeon, BRIDGET []       [] Dr. KRISTIN Barrett [x] Isabella Mortensen RN []       [x] Fredy Osborn, pharm [] Dora Herrera RN []       [] Ying Bowling NP [] Amadeo Martinez RN []         Verbal Plan Read Back:   Mary doing ok.  Biggest issue is shoulder pain related to bilateral rotator cuff tear.  He is seeing ortho tomorrow.  Per Diego, ok to have surgery.  Would decrease asa to 81 mg and hold for 2 days before and after surgery.   Called Adina, conveyed this info    Routed to RN Coordinator   Lore Mckeon, RN

## 2024-05-10 ENCOUNTER — MEDICAL CORRESPONDENCE (OUTPATIENT)
Dept: HEALTH INFORMATION MANAGEMENT | Facility: CLINIC | Age: 54
End: 2024-05-10

## 2024-05-10 ENCOUNTER — TELEPHONE (OUTPATIENT)
Dept: FAMILY MEDICINE | Facility: CLINIC | Age: 54
End: 2024-05-10

## 2024-05-10 ENCOUNTER — OFFICE VISIT (OUTPATIENT)
Dept: ORTHOPEDICS | Facility: CLINIC | Age: 54
End: 2024-05-10
Payer: COMMERCIAL

## 2024-05-10 DIAGNOSIS — M75.112 NONTRAUMATIC INCOMPLETE TEAR OF LEFT ROTATOR CUFF: Primary | ICD-10-CM

## 2024-05-10 DIAGNOSIS — M75.111 NONTRAUMATIC INCOMPLETE TEAR OF RIGHT ROTATOR CUFF: ICD-10-CM

## 2024-05-10 PROCEDURE — 99214 OFFICE O/P EST MOD 30 MIN: CPT | Mod: 25 | Performed by: STUDENT IN AN ORGANIZED HEALTH CARE EDUCATION/TRAINING PROGRAM

## 2024-05-10 PROCEDURE — 20611 DRAIN/INJ JOINT/BURSA W/US: CPT | Mod: 50 | Performed by: STUDENT IN AN ORGANIZED HEALTH CARE EDUCATION/TRAINING PROGRAM

## 2024-05-10 RX ADMIN — LIDOCAINE HYDROCHLORIDE 2 ML: 10 INJECTION, SOLUTION INFILTRATION; PERINEURAL at 16:32

## 2024-05-10 RX ADMIN — TRIAMCINOLONE ACETONIDE 40 MG: 40 INJECTION, SUSPENSION INTRA-ARTICULAR; INTRAMUSCULAR at 16:32

## 2024-05-10 RX ADMIN — BUPIVACAINE HYDROCHLORIDE 2 ML: 5 INJECTION, SOLUTION EPIDURAL; INTRACAUDAL at 16:32

## 2024-05-10 NOTE — TELEPHONE ENCOUNTER
Forms/Letter Request    Type of form/letter: Home Health Certification    Do we have the form/letter: Yes: will place form in providers bin    Who is the form from? Seema Rod[facility] [id number]    Where did/will the form come from? form was faxed in    When is form/letter needed by: asap    How would you like the form/letter returned: Fax : [phone]

## 2024-05-10 NOTE — LETTER
5/10/2024         RE: Mary Lopez  [address]  [place] [address]        Dear Colleague,    Thank you for referring your patient, Mary Lopez, to the [facility] [place]. Please see a copy of my visit note below.    Mary Lopez  :  1970  DOS: 5/10/2024  MRN: [id number]  PCP: Jimmie Hoyt    Sports Medicine Clinic Visit    Interim History - May 10, 2024  - Last seen on 2024 for bilateral shoulder pain.     - 5/3/2024 right shoulder MRI Impression:  1. Multifocal low to moderate grade intrasubstance tear of supraspinatus and infraspinatus. No high-grade rotator cuff tear. No muscle bulk loss.  2. Query posterosuperior labral tear.  3. Moderate nonsimple appearing pleural effusion, increased from comparison CT incompletely assessed. Consider further evaluation with chest CT if clinically indicated.    -5/3/2024 left shoulder MRI Impression:  1. Focal moderate grade intrasubstance tear of the supraspinatus/infraspinous junctional fibers at the footprint. No muscle atrophy.  2. Query superior and posterosuperior labral tear.  3.  Findings may be seen in clinical entity of adhesive capsulitis. Please correlate clinically.    - Since the last visit, he notes no change in bilateral shoulder pain. Taking Tramadol only at night. No pain relieving measures during the day at this time. Hopeful for injections today.  Patient received approval from his transplant team and diabetes/endocrine for corticosteroid injections.  - No interim injury.     Initial Visit: 2024  HPI  Mary Lopez is a 53 year old male who is seen as a self referral presenting with right shoulder pain.    - Mechanism of Injury:    -  Fell in 10/2023 on the right shoulder, and has had pain on the anterior aspect that can travel down upper arm intermittently.  Left shoulder is now painful from overuse on the anterior aspect of shoulder.  Similar to  the right shoulder but not as intense.   - Pertinent history and prior evaluations:    - XR R shoulder 12/22/2023 shows normal anatomic alignment without significant degenerative changes, no acute fractures or dislocations.  - Noted single left rib fracture on 09/2023  - Doing physical therapy for the right shoulder.   - Liver transplant patient (3/4/2024) secondary to alcoholic cirrhosis, with residual memory problems since surgery. Also with T2DM on long and short acting insulin, currently with large uncontrolled fluctuations in glucoses throughout the day, last HbA1c 5.6 two weeks ago.     - Pain Character:    - Location:  Bilateral shoulder  - Character:  sharp  - Duration:  for last 6 months  - Course:  steady  - Endorses:    - weakness due to pain, decreased ROM/flexibility, decreased joint mobility, decreased strength and impaired posture making it difficult to do ADL, work tasks, recreational activities  - Denies:    - clicking/popping, grinding, mechanical locking symptoms, instability, numbness, tingling  - Alleviating factors:    - rest, activity modifications, tylenol, muscle relaxers, lidocaine patches, Voltaren gel  - Aggravating factors:    - lifting heavy objects, overhead activities, handgrip activities  - Other treatments tried:    - tylenol, lidocaine patches, Voltaren gel, flexeril    - Patient Goals:    - understand the cause of the symptoms, be able to manage the symptoms successfully  - Social History:   - On disability. Previous work:        Review of Systems  Musculoskeletal: as above  Remainder of review of systems is negative including constitutional, CV, pulmonary, GI, Skin and Neurologic except as noted in HPI or medical history.    Past Medical History:   Diagnosis Date     ANGEL (acute kidney injury) (H24)      Alcoholic cirrhosis of liver without ascites (H) 07/13/2023     Alcoholic hepatitis with ascites (H28) 10/03/2023     Alcoholic hepatitis without ascites (H28)  2023     Closed fracture of one rib of left side 2023     Concussion without loss of consciousness 2020     Decompensated hepatic cirrhosis (H) 09/15/2023     Diabetes mellitus, type 2 (H) 2023     Essential hypertension 2020     Ganglion cyst of wrist, right 2024     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)      Liver replaced by transplant (H) 2024     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    ACR PRAJAPATI 6-7/9, treated with steroids     Mild hyperlipidemia 2021     Persistent insomnia 2023     Portal hypertension (H) 2023     Scrotal abscess      Secondary esophageal 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 (H) 2023     Tobacco abuse disorder 2020     Type 2 diabetes mellitus with hyperglycemia (H) 2023     Past Surgical History:   Procedure Laterality Date     BENCH LIVER  3/5/2024    Procedure: Bench liver;  Surgeon: Ryder Cortez MD;  Location: [facility] OR     CHOLECYSTECTOMY       COLONOSCOPY N/A 2024    Procedure: COLONOSCOPY, WITH POLYPECTOMY;  Surgeon: Jak Urbina MD;  Location: [facility]     CV RIGHT HEART CATH MEASUREMENTS RECORDED N/A 2024    Procedure: Right Heart Catheterization;  Surgeon: Alfred Tafoya MD;  Location:  HEART CARDIAC CATH LAB     IR LIVER BIOPSY PERCUTANEOUS  3/15/2024     IR RETROPERITONEAL ABSCESS DRAINAGE  2024     TONSILLECTOMY       TRANSPLANT LIVER RECIPIENT  DONOR N/A 3/5/2024    Procedure: Transplant liver recipient  donor, bile duct stent placement;  Surgeon: Ryder Cortez MD;  Location: [facility] OR     VASECTOMY       Family History   Problem Relation Age of Onset     Anxiety Disorder Mother      Depression Mother      Bipolar Disorder Mother      Chronic Obstructive Pulmonary Disease Mother      Lung Cancer Mother 81     Morbid Obesity Father      Diabetes Father      Diabetes Type 2  Brother      Substance Abuse Maternal Grandfather      Substance Abuse  Paternal Grandfather      Colon Cancer No family hx of      Liver Disease No family hx of          Objective  There were no vitals taken for this visit.    General: healthy, alert and in no acute distress.    HEENT: no scleral icterus or conjunctival erythema.   Skin: no suspicious lesions or rash. No jaundice.   CV: regular rhythm by palpation, 2+ distal pulses.  Resp: normal respiratory effort without conversational dyspnea.   Psych: normal mood and affect.    Gait: nonantalgic, appropriate coordination and balance.     Neuro:        - Sensation to light touch:    - Intact throughout the BUE including all peripheral nerve distributions.        - MSR:       RUE  LUE  - Biceps  2+ 2+  - Brachioradialis 2+ 2+  - Triceps  2+ 2+       - Special tests:   - Spurling's:  Neg     MSK - Shoulder:       - Inspection:    - No significant swelling, erythema, warmth, ecchymosis, lesion, or atrophy noted.        - ROM:    - Full AROM/PROM with lateral shoulder pain during shoulder abduction, slightly with IR/ER       - Palpation:    - TTP at the lateral shoulder, subacromial space, rotator cuff insertion.   - NTTP elsewhere.        - Strength:  (*antalgic)  - Shoulder Abduction   4+*    - Shoulder Flexion   5-*    - Shoulder Internal Rotation  5-*    - Shoulder External Rotation  5-*   - Elbow Flexion   5   - Elbow Extension   5   - Forearm Pronation   5   - Forearm Supination   5   - Wrist Extension   5   - Wrist Flexion    5   - FDI     5   - ADM     5   - FPL     5   - APB     5   - EIP     5   - EDC     5   - APL/EPB    5            - Special tests:        - Santizo: Positive   - Neers: Positive   - Empty can: Positive for pain and weakness    - Wildomar:  Neg    - Scarf:  Neg    - Speeds:  Neg    - Yergason:  Neg    - Apprehension/Relocation:  Neg       Radiology  I independently reviewed the available relevant imaging in the chart with my interpretations as above in HPI.     I independently reviewed today's new relevant  imaging, with the following interpretation:  - XR B/L shoulders 4/19/2024 shows normal anatomic alignment without acute fracture or dislocation.  No significant degenerative changes.    Procedure  Large Joint Injection/Arthocentesis: bilateral subacromial bursa    Date/Time: 5/10/2024 4:32 PM    Performed by: Alex Rico DO  Authorized by: Alex Rico DO    Indications:  Pain  Needle Size:  22 G  Guidance: ultrasound    Approach:  Anterolateral  Location:  Shoulder  Laterality:  Bilateral      Site:  Bilateral subacromial bursa  Medications (Right):  40 mg triamcinolone 40 MG/ML; 2 mL BUPivacaine (PF) 0.5 %; 2 mL lidocaine 1 %  Medications (Left):  40 mg triamcinolone 40 MG/ML; 2 mL BUPivacaine (PF) 0.5 %; 2 mL lidocaine 1 %  Outcome:  Tolerated well, no immediate complications  Procedure discussed: discussed risks, benefits, and alternatives    Consent Given by:  Patient  Prep: patient was prepped and draped in usual sterile fashion     Ultrasound images of procedure were permanently stored.     Subacromial Bursa - Ultrasound Guided  The patient was informed of the risks and the benefits of the procedure and a written consent was signed. The patient s shoulder was prepped with chlorhexidine in sterile fashion.   An injectate solution containing 2 mL of 1% lidocaine, 2 mL of 0.5% Bupivacaine, and 1 mL of Kenalog (40 mg/mL) was drawn up into a 5 mL syringe. Injection was performed using sterile technique.  Under ultrasound guidance a 1.5-inch 22-gauge needle was used to enter the subacromial bursa.  An anterolateral approach was used with arm held in neutral Crass position.  Needle placement was visualized and documented with ultrasound.  Ultrasound visualization was necessary to ensure placement in to the bursa and not the rotator cuff tendon which could potentially cause further tendon damage.  Injection performed in-plane. Images were permanently stored for the patient's record. There were no  complications. The patient tolerated the procedure well. There was negligible bleeding.  The procedure was duplicated on the contralateral side using an identical protocol.         Assessment  1. Nontraumatic incomplete tear of left rotator cuff    2. Nontraumatic incomplete tear of right rotator cuff          Plan  Mary Lopez is a pleasant 53 year old male that presents with chronic bilateral shoulder pain.  Right shoulder pain started after a fall directly onto the shoulder in October 2023.  He has had anterior shoulder pain since that time that will occasionally radiate down the arm.  He feels antalgic weakness in the shoulder and has been using it less and less due to the pain.  During that time, he has been overcompensating with the left shoulder and started to feel very similar symptoms on the left.  On exam there is positive impingement signs bilaterally with enough antalgic weakness to be concern for the possibility of rotator cuff tear, as well as the possibility of a more traumatic injury on the right.  He and wife are highly in favor of advanced imaging workup at this time and establishing a good short and long-term treatment plan for the shoulders while he is recovering from recent liver transplantation.     We discussed the nature of the condition and available treatment options, and mutually agreed upon the following plan:    - Imaging:          - Reviewed and independently interpreted the relevant imaging in the chart, including any imaging ordered for today's clinic.  - Reviewed results and images with patient.   -Will be important to establish the integrity of the rotator cuff complex with advanced imaging, MRI order has been placed for right and left shoulders and instructions given for scheduling the MRI and follow-up with me afterward.  - Medications:          - Discussed pharmacologic options for pain relief.   - May use NSAIDs (Ibuprofen, Naproxen) or Acetaminophen (Tylenol) as  needed for pain control.   - Do not take these if previously advised to avoid them for other medical conditions.  - May also use topical medications such as lidocaine, IcyHot, BioFreeze, or Voltaren gel as needed for pain control.    - Voltaren gel is an anti-inflammatory cream that may be used up to 4 times per day over the painful area.   - Injections:          - Discussed possible injection options and alternatives.    - Injection options include: Possibility for intra-articular glenohumeral joint injection, subacromial/subdeltoid bursa injection with corticosteroid if okay with transplant team.    - Deferred injections today and will consider them in the future as needed.   - Therapy:          - Discussed the benefits of therapy vs home exercise program for optimization of range of motion, flexibility, strength, stability and function.   - Preference is for therapy.   -Physical therapy referral placed today and instructed to call [phone] to schedule appointments.   - Modalities:          - May use ice, heat, massage or other modalities as needed.  - Surgery:          - Discussed non-operative and operative treatment options for the patient's condition. Goal is to continue conservative care for as long as possible before surgical intervention would need to be considered.  - Activity:          - Encouraged to remain active and participate in regular activities as symptoms allow.   Avoid or modify exacerbating activities as needed.  - Follow up:          - When results of the advanced imaging are available to discuss the results and next steps in treatment.   - May follow up sooner for new/worsening symptoms.  - May contact clinic by phone or MyChart for questions or concerns.     Updated Plan - 5/10/24:  Mary presents to discuss results of his recent MRI which shows rotator cuff tear partial tearing of the supraspinatus and infraspinatus bilaterally, possible labral tears as well.  He is interested in  obtaining corticosteroid injections of bilateral subacromial bursa.  Subacromial bursa injections were performed today in clinic without complication, patient tolerated procedure well.  Also placed an order for physical therapy and instructions given for scheduling therapy appointments.  May contact clinic for questions/concerns.      Alex Rico DO, CAQSM  [facility]  [facility] - Department of Orthopedic Surgery       Disclaimer:  This note was prepared and written using Dragon Medical dictation software. As a result, there may be errors in the script that have gone undetected. Please consider this when interpreting the information in this note.       Again, thank you for allowing me to participate in the care of your patient.        Sincerely,        Alex Rico DO

## 2024-05-13 ENCOUNTER — LAB (OUTPATIENT)
Dept: LAB | Facility: CLINIC | Age: 54
End: 2024-05-13
Payer: COMMERCIAL

## 2024-05-13 DIAGNOSIS — Z94.4 LIVER REPLACED BY TRANSPLANT (H): ICD-10-CM

## 2024-05-13 LAB
ALBUMIN SERPL BCG-MCNC: 3.7 G/DL (ref 3.5–5.2)
ALP SERPL-CCNC: 131 U/L (ref 40–150)
ALT SERPL W P-5'-P-CCNC: <5 U/L (ref 0–70)
ANION GAP SERPL CALCULATED.3IONS-SCNC: 7 MMOL/L (ref 7–15)
AST SERPL W P-5'-P-CCNC: 13 U/L (ref 0–45)
BILIRUB DIRECT SERPL-MCNC: 0.23 MG/DL (ref 0–0.3)
BILIRUB SERPL-MCNC: 0.4 MG/DL
BUN SERPL-MCNC: 15.2 MG/DL (ref 6–20)
CALCIUM SERPL-MCNC: 9.9 MG/DL (ref 8.6–10)
CHLORIDE SERPL-SCNC: 100 MMOL/L (ref 98–107)
CREAT SERPL-MCNC: 1.03 MG/DL (ref 0.67–1.17)
DEPRECATED HCO3 PLAS-SCNC: 29 MMOL/L (ref 22–29)
EGFRCR SERPLBLD CKD-EPI 2021: 87 ML/MIN/1.73M2
ERYTHROCYTE [DISTWIDTH] IN BLOOD BY AUTOMATED COUNT: 14.1 % (ref 10–15)
GLUCOSE SERPL-MCNC: 417 MG/DL (ref 70–99)
HCT VFR BLD AUTO: 32.8 % (ref 40–53)
HGB BLD-MCNC: 11 G/DL (ref 13.3–17.7)
MAGNESIUM SERPL-MCNC: 2.3 MG/DL (ref 1.7–2.3)
MCH RBC QN AUTO: 30.6 PG (ref 26.5–33)
MCHC RBC AUTO-ENTMCNC: 33.5 G/DL (ref 31.5–36.5)
MCV RBC AUTO: 91 FL (ref 78–100)
PHOSPHATE SERPL-MCNC: 3.5 MG/DL (ref 2.5–4.5)
PLATELET # BLD AUTO: 191 10E3/UL (ref 150–450)
POTASSIUM SERPL-SCNC: 4.4 MMOL/L (ref 3.4–5.3)
PROT SERPL-MCNC: 6.6 G/DL (ref 6.4–8.3)
RBC # BLD AUTO: 3.6 10E6/UL (ref 4.4–5.9)
SODIUM SERPL-SCNC: 136 MMOL/L (ref 135–145)
TACROLIMUS BLD-MCNC: 6.4 UG/L (ref 5–15)
TME LAST DOSE: NORMAL H
TME LAST DOSE: NORMAL H
WBC # BLD AUTO: 3 10E3/UL (ref 4–11)

## 2024-05-13 PROCEDURE — 82248 BILIRUBIN DIRECT: CPT

## 2024-05-13 PROCEDURE — 83735 ASSAY OF MAGNESIUM: CPT

## 2024-05-13 PROCEDURE — 84100 ASSAY OF PHOSPHORUS: CPT

## 2024-05-13 PROCEDURE — 85027 COMPLETE CBC AUTOMATED: CPT

## 2024-05-13 PROCEDURE — 36415 COLL VENOUS BLD VENIPUNCTURE: CPT

## 2024-05-13 PROCEDURE — 80053 COMPREHEN METABOLIC PANEL: CPT

## 2024-05-13 PROCEDURE — 80197 ASSAY OF TACROLIMUS: CPT

## 2024-05-14 ENCOUNTER — TELEPHONE (OUTPATIENT)
Dept: ENDOCRINOLOGY | Facility: CLINIC | Age: 54
End: 2024-05-14

## 2024-05-14 ENCOUNTER — VIRTUAL VISIT (OUTPATIENT)
Dept: EDUCATION SERVICES | Facility: CLINIC | Age: 54
End: 2024-05-14
Payer: COMMERCIAL

## 2024-05-14 DIAGNOSIS — E11.9 TYPE 2 DIABETES MELLITUS WITHOUT COMPLICATION, WITH LONG-TERM CURRENT USE OF INSULIN (H): Chronic | ICD-10-CM

## 2024-05-14 DIAGNOSIS — Z79.4 TYPE 2 DIABETES MELLITUS WITHOUT COMPLICATION, WITH LONG-TERM CURRENT USE OF INSULIN (H): Primary | Chronic | ICD-10-CM

## 2024-05-14 DIAGNOSIS — Z79.4 TYPE 2 DIABETES MELLITUS WITHOUT COMPLICATION, WITH LONG-TERM CURRENT USE OF INSULIN (H): Chronic | ICD-10-CM

## 2024-05-14 DIAGNOSIS — Z94.4 LIVER REPLACED BY TRANSPLANT (H): Chronic | ICD-10-CM

## 2024-05-14 DIAGNOSIS — E11.9 TYPE 2 DIABETES MELLITUS WITHOUT COMPLICATION, WITH LONG-TERM CURRENT USE OF INSULIN (H): Primary | Chronic | ICD-10-CM

## 2024-05-14 PROCEDURE — 99207 PR NO BILLABLE SERVICE THIS VISIT: CPT | Mod: 95 | Performed by: NUTRITIONIST

## 2024-05-14 RX ORDER — TACROLIMUS 1 MG/1
3 CAPSULE ORAL EVERY 12 HOURS
Qty: 540 CAPSULE | Refills: 3 | Status: SHIPPED | OUTPATIENT
Start: 2024-05-14 | End: 2024-05-21

## 2024-05-14 RX ORDER — INSULIN PMP CART,AUT,G6/7,CNTR
1 EACH SUBCUTANEOUS
Qty: 10 EACH | Refills: 0 | Status: SHIPPED | OUTPATIENT
Start: 2024-05-14 | End: 2024-09-04

## 2024-05-14 RX ORDER — TACROLIMUS 5 MG/1
5 CAPSULE ORAL EVERY 12 HOURS
Qty: 180 CAPSULE | Refills: 3 | Status: SHIPPED | OUTPATIENT
Start: 2024-05-14 | End: 2024-05-21

## 2024-05-14 NOTE — TELEPHONE ENCOUNTER
ISSUE:   Tacrolimus IR level 6.4 on 5/13, goal 10, dose 7 mg BID.    PLAN:   Call Patient and confirm this was an accurate 12-hour trough.   Verify Tacrolimus IR dose.   Confirm no new medications or or missed doses.   Confirm no new illness / infection / diarrhea.   If accurate trough and accurate dose, increase Tacrolimus IR dose to 8 mg BID     Repeat labs on Thursday 5/16  Lore Mckeon RN   OUTCOME:   Spoke with Rosio, they confirm accurate trough level and current dose 7 mg BID.   Rosio  confirmed dose change to 8 mg BID.  Rosio agreed to repeat labs in 3 days.   Orders sent to [facility] pharmacy for dose change and lab for repeat labs.   Rosio voiced understanding of plan.     Rosio wanted to know if the magnesium can be reduced now that the level is is on the higher end of the range.     Valentina Enamorado LPN

## 2024-05-14 NOTE — TELEPHONE ENCOUNTER
Writer asked Rosio to have the insurance company fax a new for to complete for FMLA as the one that was emailed was not legible.

## 2024-05-14 NOTE — PROGRESS NOTES
Virtual Visit Details    Type of service:  Video Visit     Originating Location (pt. Location): Home    Distant Location (provider location):  On-site  Platform used for Video Visit: Yenifer    Diabetes Self-Management Education & Support    Maryclement Smith Jessica presents today for education related to Type 2 diabetes    Patient is being treated with:  insulin  He is accompanied by spouse    Year of diagnosis: 2023  Referring provider:  Lai  Patient is followed by Suzanne THOMPSON, now followed by Geovanna THOMPSON  Living Situation: with spouse   Employment: n/a    PATIENT CONCERNS RELATED TO DIABETES SELF MANAGEMENT:       ASSESSMENT:    Taking Medication:     Current Diabetes Management per Patient:  Taking diabetes medications? yes:     Diabetes Medication(s)       Diabetic Other       Glucagon (GVOKE HYPOPEN) 1 MG/0.2ML pen Inject the contents of 1 device under the skin into lower abdomen, outer thigh, or outer upper arm as needed for hypoglycemia. If no response after 15 minutes, additional 1 mg dose from a new device may be injected while waiting for emergency assistance.       Insulin       insulin aspart (NOVOLOG PEN) 100 UNIT/ML pen Inject 1-10 Units Subcutaneous 3 times daily (before meals) Humalog 4 units with meals plus correction scale 1:50 >140 TID AC and 1:50 >200 HS Pre-meal Humalog correction scale: Do Not give Correction Insulin if Pre-Meal BG less than 140. For Pre-Meal  - 189 give 1 unit. For Pre-Meal  - 239 give 2 units. For Pre-Meal  - 289 give 3 units. For Pre-Meal  - 339 give 4 units. For Pre-Meal - 399 give 5 units. For Pre-Meal -449 give 6 units For Pre-Meal BG greater than or equal to 450 give 7 units. To be given with prandial insulin, and based on pre-meal blood glucose. Bedtime Humalog correction scale: Do Not give Bedtime Correction Insulin if BG less than 200. For  - 249 give 1 units. For  - 299 give 2 units. For  - 349 give 3  units. For  -399 give 4 units. For BG greater than or equal to 400 give 5 units. Notify provider if glucose greater than or equal to 350 mg/dL after administration of correction dose.     Insulin Glargine-yfgn (SEMGLEE, YFGN,) 100 UNIT/ML SOLN Inject 5 Units Subcutaneous daily     Patient taking differently: Inject 5 Units Subcutaneous daily As needed            Monitoring          Taking semglee 12 units  Humalog 5 with breakfast, 4 with lunch and 3 units with dinner  No correction for now    Patient's most recent   Lab Results   Component Value Date    A1C 8.1 10/31/2023        Healthy Eating:   encouraged consistent carb diet    Problem Solving:      Patient is at risk of hypoglycemia?: YES  Hospitalizations for hyper or hypoglycemia: No      EDUCATION and INSTRUCTION PROVIDED AT THIS VISIT:    Reviewed dexcom reports. Patient had steroid injections in his shoulders and is now hyperglycemic. We had previously been reducing his insulin doses due to hypoglycemia. He is taking 12 units semglee and 5/4/3 units novolog with meals.  Recommended 15 units semglee today and novolog 6/5/4 plus correction 1/50/140/200 but patient will likely need more than that. Will discuss with Geovanna Ferreira and get back to them with a plan.     Patient-stated goal written and given to Mary Lopez.  Verbalized and demonstrated understanding of instructions.       FOLLOW-UP:    One week    Time spent with patient at today's visit was 18 minutes.      Joined the call at 5/14/2024, 7:35:32 am.  Left the call at 5/14/2024, 7:54:31 am.  You were on the call for 18 minutes 58 seconds    Any diabetes medication dose changes were made via the CDE Protocol and Collaborative Practice Agreement with [place] and  Vishal.  A copy of this encounter was provided to patient's referring provider.

## 2024-05-14 NOTE — NURSING NOTE
Is the patient currently in the state of [place]? YES    Visit mode:VIDEO    If the visit is dropped, the patient can be reconnected by: VIDEO VISIT: Send to e-mail at: [email or web address]    Will anyone else be joining the visit? NO  (If patient encounters technical issues they should call [phone]    How would you like to obtain your AVS? MyChart    Are changes needed to the allergy or medication list? No    Are refills needed on medications prescribed by this physician? Unknown    Reason for visit: RECHECK    Autumn CHRISTY

## 2024-05-14 NOTE — TELEPHONE ENCOUNTER
Left Voicemail (1st Attempt) and Sent Mychart (1st Attempt) for the patient to call back and schedule the following:    Appointment type: Return diabetes  Provider: Danitza Ferreira  Return date: 2-3 months  Specialty phone number: [phone]  Additional appointment(s) needed: NA  Additonal Notes: NA

## 2024-05-15 ENCOUNTER — TELEPHONE (OUTPATIENT)
Dept: TRANSPLANT | Facility: CLINIC | Age: 54
End: 2024-05-15
Payer: COMMERCIAL

## 2024-05-15 ENCOUNTER — OFFICE VISIT (OUTPATIENT)
Dept: TRANSPLANT | Facility: CLINIC | Age: 54
End: 2024-05-15
Attending: TRANSPLANT SURGERY
Payer: COMMERCIAL

## 2024-05-15 VITALS
HEART RATE: 78 BPM | SYSTOLIC BLOOD PRESSURE: 131 MMHG | DIASTOLIC BLOOD PRESSURE: 88 MMHG | TEMPERATURE: 98.1 F | BODY MASS INDEX: 28.71 KG/M2 | WEIGHT: 184.7 LBS | OXYGEN SATURATION: 98 %

## 2024-05-15 DIAGNOSIS — Z76.82 LIVER TRANSPLANT CANDIDATE: ICD-10-CM

## 2024-05-15 DIAGNOSIS — Z94.4 LIVER REPLACED BY TRANSPLANT (H): Chronic | ICD-10-CM

## 2024-05-15 DIAGNOSIS — Z94.4 LIVER TRANSPLANTED (H): ICD-10-CM

## 2024-05-15 PROCEDURE — 99213 OFFICE O/P EST LOW 20 MIN: CPT | Mod: 24 | Performed by: NURSE PRACTITIONER

## 2024-05-15 PROCEDURE — 99213 OFFICE O/P EST LOW 20 MIN: CPT | Performed by: NURSE PRACTITIONER

## 2024-05-15 RX ORDER — SULFAMETHOXAZOLE AND TRIMETHOPRIM 400; 80 MG/1; MG/1
1 TABLET ORAL EVERY EVENING
Qty: 30 TABLET | Refills: 5 | Status: SHIPPED | OUTPATIENT
Start: 2024-05-15 | End: 2024-06-12

## 2024-05-15 RX ORDER — ASPIRIN 325 MG
325 TABLET ORAL DAILY
Qty: 30 TABLET | Refills: 5 | Status: SHIPPED | OUTPATIENT
Start: 2024-05-15 | End: 2024-06-12

## 2024-05-15 RX ORDER — VALGANCICLOVIR 450 MG/1
450 TABLET, FILM COATED ORAL EVERY EVENING
Qty: 30 TABLET | Refills: 5 | Status: SHIPPED | OUTPATIENT
Start: 2024-05-15 | End: 2024-06-12

## 2024-05-15 RX ORDER — URSODIOL 300 MG/1
300 CAPSULE ORAL 2 TIMES DAILY
Qty: 60 CAPSULE | Refills: 5 | Status: SHIPPED | OUTPATIENT
Start: 2024-05-15 | End: 2024-06-12

## 2024-05-15 NOTE — PROGRESS NOTES
Transplant Surgery -OUTPATIENT IMMUNOSUPPRESSION PROGRESS NOTE    Date of Visit: 05/15/2024    Transplants:  3/5/2024 (Liver); Postoperative day:  71  ASSESMENT AND PLAN:  1.Graft Function:LFTs stable  2.Immunosuppression Management:No changes today. Continue Tac with goal of 10,  mycophenolic acid 540mg BID and pred 5.   3.Hypertension: stable readings in clinic, start checking at home and keep log.   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 Admitted on 3/30/24 for IR aspiration of hematoma, no drain.      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 Treated with Methylprednisolone 500mg x3 (3/16-3/18) followed by taper.    Feeling well overall. Struggling with hyperglycemia after cortisone injection in shoulder last week.   Saw diabetes RD yesterday and will follow up again in 1 week. Glucose in 300-400s.   Semglee was increased to 15 units daily and sliding scale resumed in addition to his scheduled TID novolog dosing.   No N/V/D. Denies constipation.   Appetite is good. On remeron at night, weight mostly maintaining.   No pain.       Patient Active Problem List   Diagnosis    Primary hypertension    Mild hyperlipidemia     Morbid obesity (H)    Portal hypertensive gastropathy (H)    Secondary esophageal 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 (H)    Anemia due to unknown mechanism    Persistent insomnia    Bilateral leg edema    Alcohol use disorder, severe, in early remission (H)    Gastric varices    Severe malnutrition (H24)    Type 2 diabetes mellitus without complication, with long-term current use of insulin (H)    Generalized muscle weakness    Pulmonary HTN (H)    ANGEL (acute kidney injury) (H24)    Delirium    Liver replaced by transplant (H)    Immunosuppressed status (H24)    Liver transplant rejection (H)    Acute post-operative pain    Atrial fibrillation with RVR (H)    Anemia due to blood loss, acute    Hypomagnesemia    Hypervolemia    Unspecified abdominal pain    Ganglion cyst of wrist, right     SOCIAL /FAMILY HISTORY: [x]                  No recent change    Past Medical History:   Diagnosis Date    ANGEL (acute kidney injury) (H24)     Alcoholic cirrhosis of liver without ascites (H) 07/13/2023    Alcoholic hepatitis with ascites (H28) 10/03/2023    Alcoholic hepatitis without ascites (H28) 07/13/2023    Closed fracture of one rib of left side 09/14/2023    Concussion without loss of consciousness 03/11/2020    Decompensated hepatic cirrhosis (H) 09/15/2023    Diabetes mellitus, type 2 (H) 11/22/2023    Essential hypertension 03/11/2020    Ganglion cyst of wrist, right 04/18/2024    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)     Liver replaced by transplant (H) 03/05/2024    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    ACR PRAJAPATI 6-7/9, treated with steroids    Mild hyperlipidemia 12/07/2021    Persistent insomnia 07/13/2023    Portal hypertension (H) 07/13/2023    Scrotal abscess     Secondary esophageal 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 (H) 07/13/2023    Tobacco abuse disorder 03/11/2020    Type 2 diabetes mellitus with hyperglycemia (H) 12/22/2023     Past Surgical History:   Procedure Laterality Date    BENCH LIVER   3/5/2024    Procedure: Bench liver;  Surgeon: Ryder Cortez MD;  Location: [facility]    CHOLECYSTECTOMY      COLONOSCOPY N/A 2024    Procedure: COLONOSCOPY, WITH POLYPECTOMY;  Surgeon: Jak Urbina MD;  Location: [facility]    CV RIGHT HEART CATH MEASUREMENTS RECORDED N/A 2024    Procedure: Right Heart Catheterization;  Surgeon: Alfred Tafoya MD;  [facility]    IR LIVER BIOPSY PERCUTANEOUS  3/15/2024    IR RETROPERITONEAL ABSCESS DRAINAGE  2024    TONSILLECTOMY      TRANSPLANT LIVER RECIPIENT  DONOR N/A 3/5/2024    Procedure: Transplant liver recipient  donor, bile duct stent placement;  Surgeon: Ryder Cortez MD;  Location: [facility]    VASECTOMY       Social History     Socioeconomic History    Marital status:      Spouse name: Not on file    Number of children: Not on file    Years of education: Not on file    Highest education level: Not on file   Occupational History    Occupation: Not working now   Tobacco Use    Smoking status: Former     Types: Cigarettes     Passive exposure: Never    Smokeless tobacco: Current     Types: Chew     Last attempt to quit: 2004    Tobacco comments:     Chew daily 1/8 of a tin per day   Vaping Use    Vaping status: Never Used   Substance and Sexual Activity    Alcohol use: Not Currently     Alcohol/week: 12.0 standard drinks of alcohol     Types: 12 Standard drinks or equivalent per week     Comment: Sober since 2023    Drug use: Not Currently    Sexual activity: Yes     Partners: Female     Birth control/protection: Male Surgical   Other Topics Concern    Parent/sibling w/ CABG, MI or angioplasty before 65F 55M? No   Social History Narrative    Not on file     Social Determinants of Health     Financial Resource Strain: Low Risk  (2023)    Financial Resource Strain     Within the past 12 months, have you or your family members you live with been unable to get utilities (heat, electricity)  when it was really needed?: No   Food Insecurity: No Food Insecurity (2024)    Received from [facility]     Hunger Vital Sign     Worried About Running Out of Food in the Last Year: Never true     Ran Out of Food in the Last Year: Never true   Transportation Needs: No Transportation Needs (2024)    Received from [facility]     PRAPARE - Transportation     Lack of Transportation (Medical): No     Lack of Transportation (Non-Medical): No   Physical Activity: Not on file   Stress: Not on file   Social Connections: Not on file   Interpersonal Safety: Low Risk  (2023)    Interpersonal Safety     Do you feel physically and emotionally safe where you currently live?: Yes     Within the past 12 months, have you been hit, slapped, kicked or otherwise physically hurt by someone?: No     Within the past 12 months, have you been humiliated or emotionally abused in other ways by your partner or ex-partner?: No   Housing Stability: Low Risk  (2024)    Received from [facility]     Housing Stability Vital Sign     Unable to Pay for Housing in the Last Year: No     Number of Places Lived in the Last Year: 1     Unstable Housing in the Last Year: No     Prescription Medications as of 5/15/2024         Rx Number Disp Refills Start End Last Dispensed Date Next Fill Date Owning Pharmacy    acetaminophen (TYLENOL) 500 MG tablet  30 tablet 0 3/21/2024 --   [facility] [address]    Sig: Take 1-2 tablets (500-1,000 mg) by mouth every 6 hours as needed for mild pain    Class: E-Prescribe    Route: Oral    aspirin (ASA) 325 MG tablet  30 tablet 5 3/22/2024 --   [facility] [address]     Si tablet (325 mg) by Oral or Feeding Tube route daily    Class: E-Prescribe    Route: Oral or Feeding Tube    blood glucose (NO BRAND  SPECIFIED) test strip  100 strip 6 10/24/2023 --   [id number] - [place], [place] - [address] [place] [address]    Sig: Use to test blood sugar two times daily or as directed. To accompany: Blood Glucose Monitor Brands: per insurance.    Class: E-Prescribe    blood glucose monitoring (NO BRAND SPECIFIED) meter device kit  1 kit 0 10/24/2023 --   [id number] - [place]    Sig: Use to test blood sugar twice times daily or as directed. Preferred blood glucose meter OR supplies to accompany: Blood Glucose Monitor Brands: per insurance.    Class: E-Prescribe    Continuous Blood Gluc Sensor (DEXCOM G7 SENSOR) MISC  3 each 5 3/21/2024 --   [address]    Sig: Change every 10 days.    Class: E-Prescribe    Continuous Blood Gluc Sensor (DEXCOM G7 SENSOR) MISC  3 each 5 11/16/2023 --   [id number] - [place], [address]    Sig: Change every 10 days.    Class: E-Prescribe    fludrocortisone (FLORINEF) 0.1 MG tablet  30 tablet 3 5/6/2024 --   [id number] - [place], [address]    Sig: Take 1 tablet (0.1 mg) by mouth daily    Class: E-Prescribe    Route: Oral    Glucagon (GVOKE HYPOPEN) 1 MG/0.2ML pen  0.4 mL 1 3/21/2024 --   [address]    Sig: Inject the contents of 1 device under the skin into lower abdomen, outer thigh, or outer upper arm as needed for hypoglycemia. If no response after 15 minutes, additional 1 mg dose from a new device may be injected while waiting for emergency assistance.    Class: E-Prescribe    Notes to Pharmacy: Replaces [id number] per insurance formulary    insulin aspart (NOVOLOG PEN) 100 UNIT/ML pen  15 mL 1 3/21/2024 --   [address]     Sig: Inject 1-10 Units Subcutaneous 3 times daily (before meals) Humalog 4 units with meals plus correction scale 1:50 >140 TID AC and 1:50 >200 HS Pre-meal Humalog correction scale: Do Not give Correction Insulin if Pre-Meal BG less than 140. For Pre-Meal  - 189 give 1 unit. For Pre-Meal  - 239 give 2 units. For Pre-Meal  - 289 give 3 units. For Pre-Meal  - 339 give 4 units. For Pre-Meal - 399 give 5 units. For Pre-Meal -449 give 6 units For Pre-Meal BG greater than or equal to 450 give 7 units. To be given with prandial insulin, and based on pre-meal blood glucose. Bedtime Humalog correction scale: Do Not give Bedtime Correction Insulin if BG less than 200. For  - 249 give 1 units. For  - 299 give 2 units. For  - 349 give 3 units. For  -399 give 4 units. For BG greater than or equal to 400 give 5 units. Notify provider if glucose greater than or equal to 350 mg/dL after administration of correction dose.    Class: E-Prescribe    Route: Subcutaneous    Insulin Disposable Pump (OMNIPOD 5 G6 PODS, GEN 5,) MISC  10 each 0 2024 --   [facility] #[id number] - [place], [place] - [address]    Si each every 3 days Change every 3 days.    Class: E-Prescribe    Route: Does not apply    Insulin Glargine-yfgn (SEMGLEE, YFGN,) 100 UNIT/ML SOLN  -- -- 2024 --       Sig: Inject 5 Units Subcutaneous daily    Class: No Print Out    Route: Subcutaneous    insulin pen needle (32G X 4 MM) 32G X 4 MM miscellaneous  200 each 1 3/21/2024 --   [facility] [place], [place] - [address] [facility]    Sig: Use as directed by provider Per insurance coverage    Class: E-Prescribe    insulin pen needle (BD PEN NEEDLE SHERRIE 2ND GEN) 32G X 4 MM miscellaneous  100 each 11 2024 --   [facility] #[id number] - [place], [place] [facility] [address] [facility]    Sig: USES 5 PER DAY    Class: E-Prescribe     magnesium glycinate 100 MG CAPS capsule  120 capsule 2 4/18/2024 --   [facility] #[id number] - [place], [place] - [address] [facility] [facility]    Sig: Take 2 capsules (200 mg) by mouth 2 times daily    Class: E-Prescribe    Route: Oral    melatonin 10 MG TABS tablet  -- -- 8/3/2023 --       Sig: Take 1 tablet (10 mg) by mouth nightly as needed for sleep    Class: OTC    Route: Oral    mirtazapine (REMERON) 15 MG tablet  60 tablet 0 4/23/2024 --   [facility] #[id number] - [place], [place] - [address] [facility]    Sig: Take 2 tablets (30 mg) by mouth at bedtime    Class: E-Prescribe    Route: Oral    multivitamin w/minerals (THERA-VIT-M) tablet  90 tablet 1 2/2/2024 --   [facility] #[id number] - [place], [place] [id number] [facility]    Sig: TAKE 1 TABLET BY MOUTH DAILY    Class: E-Prescribe    Route: Oral    Renewals       Renewal provider: Jimmie Hoyt MD            mycophenolic acid (GENERIC EQUIVALENT) 180 MG EC tablet  540 tablet 0 4/11/2024 --   [id number] - [place], [address]    Sig: Take 3 tablets (540 mg) by mouth 2 times daily    Class: E-Prescribe    Notes to Pharmacy: TXP DT 3/5/2024 (Liver) TXP Dischg DT 3/21/2024 DX Liver replaced by transplant Z94.4 [facility] ([place], [place])    Route: Oral    omeprazole (PRILOSEC) 20 MG DR capsule  180 capsule 1 8/3/2023 --       Sig: Take 1 capsule (20 mg) by mouth 2 times daily    Class: Historical    Route: Oral    predniSONE (DELTASONE) 5 MG tablet  90 tablet 3 3/28/2024 --   [facility] #[id number] - [place], [place] - [address] [facility] [facility]    Sig: Take 1 tablet (5 mg) by mouth daily    Class: E-Prescribe    Notes to Pharmacy: TXP DT 3/5/2024 (Liver) TXP Dischg DT 3/21/2024 DX Liver replaced by transplant Z94.4 [facility]  ([place], [place])    Route: Oral    sulfamethoxazole-trimethoprim (BACTRIM) 400-80 MG tablet  30 tablet 5 3/21/2024 --   [facility] - [place], [place] - [address] [facility]    Sig: Take 1 tablet by mouth every evening    Class: E-Prescribe    Route: Oral    tacrolimus (GENERIC EQUIVALENT) 1 MG capsule  540 capsule 3 5/14/2024 --   [facility] #[id number] - [place], [place] - [address] [facility]    Sig: Take 3 capsules (3 mg) by mouth every 12 hours Total dos: 8mg BID, (6) 1 mg per day, (2) 5mg per day.    Class: E-Prescribe    Notes to Pharmacy: TXP DT 3/5/2024 (Liver) TXP Dischg DT 3/21/2024 DX Liver replaced by transplant Z94.4 TX [facility] ([place], [place])    Route: Oral    tacrolimus (GENERIC EQUIVALENT) 5 MG capsule  180 capsule 3 5/14/2024 --   [facility] #[id number] - [place], [place] - [address] [facility]    Sig: Take 1 capsule (5 mg) by mouth every 12 hours Total dose: 8 mg BID    Class: E-Prescribe    Notes to Pharmacy: TXP DT 3/5/2024 (Liver) TXP Dischg DT 3/21/2024 DX Liver replaced by transplant Z94.4 [facility] ([place], [place])    Route: Oral    thin (NO BRAND SPECIFIED) lancets  100 each 6 10/24/2023 --   [facility] #[id number] - [place], [place] - [address] [facility]    Sig: Use with lanceting device. To accompany: Blood Glucose Monitor Brands: per insurance.    Class: E-Prescribe    traMADol (ULTRAM) 50 MG tablet  30 tablet 0 4/23/2024 --   [facility] #[id number] - [place], [place] - [address] [facility]    Sig: Take 1 tablet (50 mg) by mouth daily as needed for severe pain    Class: E-Prescribe    Route: Oral    ursodiol (ACTIGALL) 300 MG capsule  60 capsule 5 3/21/2024 --   [facility] - [place], [place] - [address] [facility]    Sig: Take 1 capsule (300 mg) by mouth 2 times  daily    Class: E-Prescribe    Route: Oral    valGANciclovir (VALCYTE) 450 MG tablet  30 tablet 2 3/21/2024 --   [facility] - [place], [place] - [address] [facility]    Sig: Take 1 tablet (450 mg) by mouth every evening    Class: E-Prescribe    Route: Oral          Other food allergy and Pineapple   REVIEW OF SYSTEMS (check box if normal)  [x]               GENERAL  [x]                 PULMONARY [x]                GENITOURINARY  [x]                CNS                 [x]                 CARDIAC  [x]                 ENDOCRINE  [x]                EARS,NOSE,THROAT [x]                 GASTROINTESTINAL [x]                 NEUROLOGIC    [x]                MUSCLOSKELTAL  [x]                  HEMATOLOGY      PHYSICAL EXAM (check box if normal)/88 (BP Location: Right arm, Patient Position: Chair, Cuff Size: Adult Regular)   Pulse 78   Temp 98.1  F (36.7  C) (Oral)   Wt 83.8 kg (184 lb 11.2 oz)   SpO2 98%   BMI 28.71 kg/m          [x]            GENERAL:    [x]       EYES:  ICTERIC   []        YES  []                    NO  [x]           EXTREMITIES: Clubbing []       Y     [x]           N    [x]           EARS, NOSE, THROAT: Membranes Moist    YES   [x]                   NO []                  [x]           LUNGS:  CLEAR    YES       [x]                  NO    []                                [x]           SKIN: Jaundice           YES       []                  NO    [x]                   Rash: YES       []                  NO    [x]                                     [x]             HEART: Regular Rate          YES       [x]                  NO    []                   Incision Clean:  YES       [x]                  NO    []                                [x]                    ABDOMEN: Organomegaly YES       []                  NO    [x]                       [x]                    NEUROLOGICAL:  Nonfocal  YES       [x]                  NO    []                       [x]                     Hernia YES       []                  NO    [x]                   PSYCHIATRIC:  Appropriate  YES       [x]                  NO    []                       OTHER:        Incision well healed.   Abd soft and non-tender. No hernia.   No LE edema.                                                                                              PAIN SCALE:: 1

## 2024-05-15 NOTE — NURSING NOTE
"Chief Complaint   Patient presents with    Follow Up     Liver transplant follow-up     Vital signs:  Temp: 98.1  F (36.7  C) Temp src: Oral BP: 131/88 Pulse: 78     SpO2: 98 %       Weight: 83.8 kg (184 lb 11.2 oz)  Estimated body mass index is 28.71 kg/m  as calculated from the following:    Height as of 4/18/24: 1.708 m (5' 7.25\").    Weight as of this encounter: 83.8 kg (184 lb 11.2 oz).      Dheeraj Cruz RN on 5/15/2024 at 9:27 AM    "

## 2024-05-15 NOTE — TELEPHONE ENCOUNTER
Call to Adina.  They saw Ying Dash today.   Call to patient for general follow-up post liver transplant.    Appetite: good.  Will decrease remeron to 15 mg daily for a week  Diarrhea: none  Nausea: none  Activity: none  Pain management:  shoulders still hurt.  Taking tramadol at night  Anxiety / depression:   Physician follow-up: saw Ying Bowling this morning.  Appt w/ Flavio Mendenhall on 7/2.  Working w/ endocrine for glucose control.   Lab frequency: weekly  Med changes: decreased remeron to 15mg daily.  Is eating well, if still eating well in a week we will stop.  Gave stop date for valcyte of 6/5, bactrim 9/5.    Told Adina that we received paper work for leave for Mary. Mary is still having trouble regulating his blood sugars though is working closely w/ endocrine team.  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  He is in no way able to return to work.  He sees Dr. Muniz on 7/2.  Will re-evaluate at that time.

## 2024-05-15 NOTE — LETTER
5/15/2024         RE: Mary Lopez  [address]  [place] [address]        Dear Colleague,    Thank you for referring your patient, Mary Lopez, to the [facility] TRANSPL[facility]. Please see a copy of my visit note below.    Transplant Surgery -OUTPATIENT IMMUNOSUPPRESSION PROGRESS NOTE    Date of Visit: 05/15/2024    Transplants:  3/5/2024 (Liver); Postoperative day:  71  ASSESMENT AND PLAN:  1.Graft Function:LFTs stable  2.Immunosuppression Management:No changes today. Continue Tac with goal of 10,  mycophenolic acid 540mg BID and pred 5.   3.Hypertension: stable readings in clinic, start checking at home and keep log.   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 Admitted on 3/30/24 for IR aspiration of hematoma, no drain.      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 Treated with Methylprednisolone 500mg x3 (3/16-3/18) followed by taper.    Feeling well overall. Struggling with hyperglycemia after cortisone injection in shoulder last week.   Saw diabetes RD yesterday and will follow up again in 1 week. Glucose in 300-400s.   Semglee was increased to 15 units daily and sliding  scale resumed in addition to his scheduled TID novolog dosing.   No N/V/D. Denies constipation.   Appetite is good. On remeron at night, weight mostly maintaining.   No pain.       Patient Active Problem List   Diagnosis     Primary hypertension     Mild hyperlipidemia     Morbid obesity (H)     Portal hypertensive gastropathy (H)     Secondary esophageal 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 (H)     Anemia due to unknown mechanism     Persistent insomnia     Bilateral leg edema     Alcohol use disorder, severe, in early remission (H)     Gastric varices     Severe malnutrition (H24)     Type 2 diabetes mellitus without complication, with long-term current use of insulin (H)     Generalized muscle weakness     Pulmonary HTN (H)     ANGEL (acute kidney injury) (H24)     Delirium     Liver replaced by transplant (H)     Immunosuppressed status (H24)     Liver transplant rejection (H)     Acute post-operative pain     Atrial fibrillation with RVR (H)     Anemia due to blood loss, acute     Hypomagnesemia     Hypervolemia     Unspecified abdominal pain     Ganglion cyst of wrist, right     SOCIAL /FAMILY HISTORY: [x]                  No recent change    Past Medical History:   Diagnosis Date     ANGEL (acute kidney injury) (H24)      Alcoholic cirrhosis of liver without ascites (H) 07/13/2023     Alcoholic hepatitis with ascites (H28) 10/03/2023     Alcoholic hepatitis without ascites (H28) 07/13/2023     Closed fracture of one rib of left side 09/14/2023     Concussion without loss of consciousness 03/11/2020     Decompensated hepatic cirrhosis (H) 09/15/2023     Diabetes mellitus, type 2 (H) 11/22/2023     Essential hypertension 03/11/2020     Ganglion cyst of wrist, right 04/18/2024     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)      Liver replaced by transplant (H) 03/05/2024     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    ACR PRAJAPATI 6-7/9, treated with steroids     Mild hyperlipidemia 12/07/2021     Persistent insomnia  2023     Portal hypertension (H) 2023     Scrotal abscess      Secondary esophageal 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 (H) 2023     Tobacco abuse disorder 2020     Type 2 diabetes mellitus with hyperglycemia (H) 2023     Past Surgical History:   Procedure Laterality Date     BENCH LIVER  3/5/2024    Procedure: Bench liver;  Surgeon: Ryder Cortez MD;  Location: [facility]     CHOLECYSTECTOMY       COLONOSCOPY N/A 2024    Procedure: COLONOSCOPY, WITH POLYPECTOMY;  Surgeon: Jak Urbina MD;  Location: [facility]     CV RIGHT HEART CATH MEASUREMENTS RECORDED N/A 2024    Procedure: Right Heart Catheterization;  Surgeon: Alfred Tafoya MD;  [facility]     IR LIVER BIOPSY PERCUTANEOUS  3/15/2024     IR RETROPERITONEAL ABSCESS DRAINAGE  2024     TONSILLECTOMY       TRANSPLANT LIVER RECIPIENT  DONOR N/A 3/5/2024    Procedure: Transplant liver recipient  donor, bile duct stent placement;  Surgeon: Ryder Cortez MD;  Location: [facility] OR     VASECTOMY       Social History     Socioeconomic History     Marital status:      Spouse name: Not on file     Number of children: Not on file     Years of education: Not on file     Highest education level: Not on file   Occupational History     Occupation: Not working now   Tobacco Use     Smoking status: Former     Types: Cigarettes     Passive exposure: Never     Smokeless tobacco: Current     Types: Chew     Last attempt to quit: 2004     Tobacco comments:     Chew daily 1/8 of a tin per day   Vaping Use     Vaping status: Never Used   Substance and Sexual Activity     Alcohol use: Not Currently     Alcohol/week: 12.0 standard drinks of alcohol     Types: 12 Standard drinks or equivalent per week     Comment: Sober since 2023     Drug use: Not Currently     Sexual activity: Yes     Partners: Female     Birth control/protection: Male Surgical   Other Topics Concern     Parent/sibling w/  CABG, MI or angioplasty before 65F 55M? No   Social History Narrative     Not on file     Social Determinants of Health     Financial Resource Strain: Low Risk  (12/22/2023)    Financial Resource Strain      Within the past 12 months, have you or your family members you live with been unable to get utilities (heat, electricity) when it was really needed?: No   Food Insecurity: No Food Insecurity (1/11/2024)    Received from [facility]     Hunger Vital Sign      Worried About Running Out of Food in the Last Year: Never true      Ran Out of Food in the Last Year: Never true   Transportation Needs: No Transportation Needs (1/11/2024)    Received from [facility]     PRAPARE - Transportation      Lack of Transportation (Medical): No      Lack of Transportation (Non-Medical): No   Physical Activity: Not on file   Stress: Not on file   Social Connections: Not on file   Interpersonal Safety: Low Risk  (12/22/2023)    Interpersonal Safety      Do you feel physically and emotionally safe where you currently live?: Yes      Within the past 12 months, have you been hit, slapped, kicked or otherwise physically hurt by someone?: No      Within the past 12 months, have you been humiliated or emotionally abused in other ways by your partner or ex-partner?: No   Housing Stability: Low Risk  (1/11/2024)    Received from [facility]     Housing Stability Vital Sign      Unable to Pay for Housing in the Last Year: No      Number of Places Lived in the Last Year: 1      Unstable Housing in the Last Year: No     Prescription Medications as of 5/15/2024         Rx Number Disp Refills Start End Last Dispensed Date Next Fill Date Owning Pharmacy    acetaminophen (TYLENOL) 500 MG tablet  30 tablet 0 3/21/2024 --   [facility] - [place], [place] - [address] [facility]    Sig: Take 1-2 tablets (500-1,000 mg) by mouth every 6  hours as needed for mild pain    Class: E-Prescribe    Route: Oral    aspirin (ASA) 325 MG tablet  30 tablet 5 3/22/2024 --   [address]    Si tablet (325 mg) by Oral or Feeding Tube route daily    Class: E-Prescribe    Route: Oral or Feeding Tube    blood glucose (NO BRAND SPECIFIED) test strip  100 strip 6 10/24/2023 --   [facility] #[id number] - [place], [place] [id number] [facility]    Sig: Use to test blood sugar two times daily or as directed. To accompany: Blood Glucose Monitor Brands: per insurance.    Class: E-Prescribe    blood glucose monitoring (NO BRAND SPECIFIED) meter device kit  1 kit 0 10/24/2023 --   [facility] #[id number] - [place], [address]    Sig: Use to test blood sugar twice times daily or as directed. Preferred blood glucose meter OR supplies to accompany: Blood Glucose Monitor Brands: per insurance.    Class: E-Prescribe    Continuous Blood Gluc Sensor (DEXCOM G7 SENSOR) MISC  3 each 5 3/21/2024 --   [address]    Sig: Change every 10 days.    Class: E-Prescribe    Continuous Blood Gluc Sensor (DEXCOM G7 SENSOR) MISC  3 each 5 2023 --   [facility] #[id number] - [place], [place] [id number] [facility]    Sig: Change every 10 days.    Class: E-Prescribe    fludrocortisone (FLORINEF) 0.1 MG tablet  30 tablet 3 2024 --   [id number] - [place], [place] - [address] [facility]    Sig: Take 1 tablet (0.1 mg) by mouth daily    Class: E-Prescribe    Route: Oral    Glucagon (GVOKE HYPOPEN) 1 MG/0.2ML pen  0.4 mL 1 3/21/2024 --   [address]    Sig: Inject the contents of 1 device under the skin into lower abdomen, outer thigh, or outer upper arm as needed for hypoglycemia. If no response  after 15 minutes, additional 1 mg dose from a new device may be injected while waiting for emergency assistance.    Class: E-Prescribe    Notes to Pharmacy: Replaces [id number] per insurance formulary    insulin aspart (NOVOLOG PEN) 100 UNIT/ML pen  15 mL 1 3/21/2024 --   [place], [place] - [address]     Sig: Inject 1-10 Units Subcutaneous 3 times daily (before meals) Humalog 4 units with meals plus correction scale 1:50 >140 TID AC and 1:50 >200 HS Pre-meal Humalog correction scale: Do Not give Correction Insulin if Pre-Meal BG less than 140. For Pre-Meal  - 189 give 1 unit. For Pre-Meal  - 239 give 2 units. For Pre-Meal  - 289 give 3 units. For Pre-Meal  - 339 give 4 units. For Pre-Meal - 399 give 5 units. For Pre-Meal -449 give 6 units For Pre-Meal BG greater than or equal to 450 give 7 units. To be given with prandial insulin, and based on pre-meal blood glucose. Bedtime Humalog correction scale: Do Not give Bedtime Correction Insulin if BG less than 200. For  - 249 give 1 units. For  - 299 give 2 units. For  - 349 give 3 units. For  -399 give 4 units. For BG greater than or equal to 400 give 5 units. Notify provider if glucose greater than or equal to 350 mg/dL after administration of correction dose.    Class: E-Prescribe    Route: Subcutaneous    Insulin Disposable Pump (OMNIPOD 5 G6 PODS, GEN 5,) MISC  10 each 0 2024 --   [facility] #[id number] [place], [place] - [address] [place] [address] AT [facility]    Si each every 3 days Change every 3 days.    Class: E-Prescribe    Route: Does not apply    Insulin Glargine-yfgn (SEMGLEE, YFGN,) 100 UNIT/ML SOLN  -- -- 2024 --       Sig: Inject 5 Units Subcutaneous daily    Class: No Print Out    Route: Subcutaneous    insulin pen needle (32G X 4 MM) 32G X 4 MM miscellaneous  200 each 1 3/21/2024 --   [place], [place] -  [address] [facility]    Sig: Use as directed by provider Per insurance coverage    Class: E-Prescribe    insulin pen needle (BD PEN NEEDLE SHERRIE 2ND GEN) 32G X 4 MM miscellaneous  100 each 11 1/8/2024 --   [facility] #[id number] - [place], [address]    Sig: USES 5 PER DAY    Class: E-Prescribe    magnesium glycinate 100 MG CAPS capsule  120 capsule 2 4/18/2024 --   [facility] #[id number] - [place], [address]    Sig: Take 2 capsules (200 mg) by mouth 2 times daily    Class: E-Prescribe    Route: Oral    melatonin 10 MG TABS tablet  -- -- 8/3/2023 --       Sig: Take 1 tablet (10 mg) by mouth nightly as needed for sleep    Class: OTC    Route: Oral    mirtazapine (REMERON) 15 MG tablet  60 tablet 0 4/23/2024 --   [facility] #[id number] - [place], [place] - [address] [place] [address]    Sig: Take 2 tablets (30 mg) by mouth at bedtime    Class: E-Prescribe    Route: Oral    multivitamin w/minerals (THERA-VIT-M) tablet  90 tablet 1 2/2/2024 --   [facility] #[id number] - [place], [address]    Sig: TAKE 1 TABLET BY MOUTH DAILY    Class: E-Prescribe    Route: Oral    Renewals       Renewal provider: Jimmie Hoyt MD            mycophenolic acid (GENERIC EQUIVALENT) 180 MG EC tablet  540 tablet 0 4/11/2024 --   [id number] - [place], [address]    Sig: Take 3 tablets (540 mg) by mouth 2 times daily    Class: E-Prescribe    Notes to Pharmacy: TXP DT 3/5/2024 (Liver) TXP Dischg DT 3/21/2024 DX Liver replaced by transplant Z94.4 TX [facility] ([place], [place])    Route: Oral    omeprazole (PRILOSEC) 20 MG DR capsule  180 capsule 1 8/3/2023 --       Sig: Take 1 capsule (20 mg) by mouth 2 times daily    Class: Historical    Route: Oral    predniSONE (DELTASONE) 5 MG tablet  90 tablet 3  3/28/2024 --   [facility] #[id number] - [place], [place] - [address]  AT [facility]    Sig: Take 1 tablet (5 mg) by mouth daily    Class: E-Prescribe    Notes to Pharmacy: TXP DT 3/5/2024 (Liver) TXP Dischg DT 3/21/2024 DX Liver replaced by transplant Z94.4 TX [facility] ([place], [place])    Route: Oral    sulfamethoxazole-trimethoprim (BACTRIM) 400-80 MG tablet  30 tablet 5 3/21/2024 --   [facility] - [place], [place] - [address] [facility]    Sig: Take 1 tablet by mouth every evening    Class: E-Prescribe    Route: Oral    tacrolimus (GENERIC EQUIVALENT) 1 MG capsule  540 capsule 3 5/14/2024 --   [facility] #[id number] - [place], [place] - [address]  AT [facility]    Sig: Take 3 capsules (3 mg) by mouth every 12 hours Total dos: 8mg BID, (6) 1 mg per day, (2) 5mg per day.    Class: E-Prescribe    Notes to Pharmacy: TXP DT 3/5/2024 (Liver) TXP Dischg DT 3/21/2024 DX Liver replaced by transplant Z94.4 [facility] ([place], [place])    Route: Oral    tacrolimus (GENERIC EQUIVALENT) 5 MG capsule  180 capsule 3 5/14/2024 --   [facility] #[id number] - [place], [place] - [address]  AT [facility]    Sig: Take 1 capsule (5 mg) by mouth every 12 hours Total dose: 8 mg BID    Class: E-Prescribe    Notes to Pharmacy: TXP DT 3/5/2024 (Liver) TXP Dischg DT 3/21/2024 DX Liver replaced by transplant Z94.4 [facility] ([place], [place])    Route: Oral    thin (NO BRAND SPECIFIED) lancets  100 each 6 10/24/2023 --   [facility] #[id number] - [place], [place] - [address] [facility]    Sig: Use with lanceting device. To accompany: Blood Glucose Monitor Brands: per insurance.    Class: E-Prescribe    traMADol (ULTRAM) 50 MG tablet  30 tablet 0 4/23/2024 --   [facility] #[id number] - [place], [place] - [address] AT [facility]    Sig: Take 1 tablet (50 mg) by mouth daily as needed for severe pain    Class: E-Prescribe    Route: Oral    ursodiol (ACTIGALL) 300 MG capsule  60 capsule 5 3/21/2024 --   [address]    Sig: Take 1 capsule (300 mg) by mouth 2 times daily    Class: E-Prescribe    Route: Oral    valGANciclovir (VALCYTE) 450 MG tablet  30 tablet 2 3/21/2024 --   [address]    Sig: Take 1 tablet (450 mg) by mouth every evening    Class: E-Prescribe    Route: Oral          Other food allergy and Pineapple   REVIEW OF SYSTEMS (check box if normal)  [x]               GENERAL  [x]                 PULMONARY [x]                GENITOURINARY  [x]                CNS                 [x]                 CARDIAC  [x]                 ENDOCRINE  [x]                EARS,NOSE,THROAT [x]                 GASTROINTESTINAL [x]                 NEUROLOGIC    [x]                MUSCLOSKELTAL  [x]                  HEMATOLOGY      PHYSICAL EXAM (check box if normal)/88 (BP Location: Right arm, Patient Position: Chair, Cuff Size: Adult Regular)   Pulse 78   Temp 98.1  F (36.7  C) (Oral)   Wt 83.8 kg (184 lb 11.2 oz)   SpO2 98%   BMI 28.71 kg/m          [x]            GENERAL:    [x]       EYES:  ICTERIC   []        YES  []                    NO  [x]           EXTREMITIES: Clubbing []       Y     [x]           N    [x]           EARS, NOSE, THROAT: Membranes Moist    YES   [x]                   NO []                  [x]           LUNGS:  CLEAR    YES       [x]                  NO    []                                [x]           SKIN: Jaundice           YES       []                  NO    [x]                   Rash: YES       []                  NO    [x]                                     [x]             HEART: Regular Rate          YES       [x]                  NO    []                    Incision Clean:  YES       [x]                  NO    []                                [x]                    ABDOMEN: Organomegaly YES       []                  NO    [x]                       [x]                    NEUROLOGICAL:  Nonfocal  YES       [x]                  NO    []                       [x]                    Hernia YES       []                  NO    [x]                   PSYCHIATRIC:  Appropriate  YES       [x]                  NO    []                       OTHER:        Incision well healed.   Abd soft and non-tender. No hernia.   No LE edema.                                                                                              PAIN SCALE:: 1       Again, thank you for allowing me to participate in the care of your patient.        Sincerely,        LESA Jeffries CNP

## 2024-05-16 NOTE — TELEPHONE ENCOUNTER
Left Voicemail (2nd Attempt) and Sent Mychart (2nd Attempt) for the patient to call back and schedule the following:    Appointment type: Return Diabetes  Provider: Geovanna Ferreira  Return date: 2-3 months   Specialty phone number: [phone]  Additional appointment(s) needed: NA  Additonal Notes: LVM x2/MyC x2 (final)

## 2024-05-17 ENCOUNTER — TELEPHONE (OUTPATIENT)
Dept: EDUCATION SERVICES | Facility: CLINIC | Age: 54
End: 2024-05-17
Payer: COMMERCIAL

## 2024-05-18 RX ORDER — TRIAMCINOLONE ACETONIDE 40 MG/ML
40 INJECTION, SUSPENSION INTRA-ARTICULAR; INTRAMUSCULAR
Status: DISCONTINUED | OUTPATIENT
Start: 2024-05-10 | End: 2024-06-23

## 2024-05-18 RX ORDER — LIDOCAINE HYDROCHLORIDE 10 MG/ML
2 INJECTION, SOLUTION INFILTRATION; PERINEURAL
Status: DISCONTINUED | OUTPATIENT
Start: 2024-05-10 | End: 2024-06-23

## 2024-05-18 RX ORDER — BUPIVACAINE HYDROCHLORIDE 5 MG/ML
2 INJECTION, SOLUTION EPIDURAL; INTRACAUDAL
Status: DISCONTINUED | OUTPATIENT
Start: 2024-05-10 | End: 2024-06-23

## 2024-05-20 ENCOUNTER — LAB (OUTPATIENT)
Dept: LAB | Facility: CLINIC | Age: 54
End: 2024-05-20
Payer: COMMERCIAL

## 2024-05-20 DIAGNOSIS — Z94.4 LIVER REPLACED BY TRANSPLANT (H): ICD-10-CM

## 2024-05-20 LAB
ALBUMIN SERPL BCG-MCNC: 3.8 G/DL (ref 3.5–5.2)
ALP SERPL-CCNC: 92 U/L (ref 40–150)
ALT SERPL W P-5'-P-CCNC: 8 U/L (ref 0–70)
ANION GAP SERPL CALCULATED.3IONS-SCNC: 7 MMOL/L (ref 7–15)
AST SERPL W P-5'-P-CCNC: 20 U/L (ref 0–45)
BILIRUB DIRECT SERPL-MCNC: 0.21 MG/DL (ref 0–0.3)
BILIRUB SERPL-MCNC: 0.4 MG/DL
BUN SERPL-MCNC: 17 MG/DL (ref 6–20)
CALCIUM SERPL-MCNC: 9.8 MG/DL (ref 8.6–10)
CHLORIDE SERPL-SCNC: 103 MMOL/L (ref 98–107)
CREAT SERPL-MCNC: 0.99 MG/DL (ref 0.67–1.17)
DEPRECATED HCO3 PLAS-SCNC: 28 MMOL/L (ref 22–29)
EGFRCR SERPLBLD CKD-EPI 2021: >90 ML/MIN/1.73M2
ERYTHROCYTE [DISTWIDTH] IN BLOOD BY AUTOMATED COUNT: 14.6 % (ref 10–15)
GLUCOSE SERPL-MCNC: 255 MG/DL (ref 70–99)
HCT VFR BLD AUTO: 34.7 % (ref 40–53)
HGB BLD-MCNC: 11.5 G/DL (ref 13.3–17.7)
MAGNESIUM SERPL-MCNC: 2 MG/DL (ref 1.7–2.3)
MCH RBC QN AUTO: 30.4 PG (ref 26.5–33)
MCHC RBC AUTO-ENTMCNC: 33.1 G/DL (ref 31.5–36.5)
MCV RBC AUTO: 92 FL (ref 78–100)
PHOSPHATE SERPL-MCNC: 3.8 MG/DL (ref 2.5–4.5)
PLATELET # BLD AUTO: 187 10E3/UL (ref 150–450)
POTASSIUM SERPL-SCNC: 3.9 MMOL/L (ref 3.4–5.3)
PROT SERPL-MCNC: 6.3 G/DL (ref 6.4–8.3)
RBC # BLD AUTO: 3.78 10E6/UL (ref 4.4–5.9)
SODIUM SERPL-SCNC: 138 MMOL/L (ref 135–145)
TACROLIMUS BLD-MCNC: 4.8 UG/L (ref 5–15)
TME LAST DOSE: ABNORMAL H
TME LAST DOSE: ABNORMAL H
WBC # BLD AUTO: 3.2 10E3/UL (ref 4–11)

## 2024-05-20 PROCEDURE — 84100 ASSAY OF PHOSPHORUS: CPT

## 2024-05-20 PROCEDURE — 80053 COMPREHEN METABOLIC PANEL: CPT

## 2024-05-20 PROCEDURE — 80197 ASSAY OF TACROLIMUS: CPT

## 2024-05-20 PROCEDURE — 36415 COLL VENOUS BLD VENIPUNCTURE: CPT

## 2024-05-20 PROCEDURE — 83735 ASSAY OF MAGNESIUM: CPT

## 2024-05-20 PROCEDURE — 85027 COMPLETE CBC AUTOMATED: CPT

## 2024-05-20 PROCEDURE — 82248 BILIRUBIN DIRECT: CPT

## 2024-05-21 ENCOUNTER — TELEPHONE (OUTPATIENT)
Dept: TRANSPLANT | Facility: CLINIC | Age: 54
End: 2024-05-21

## 2024-05-21 ENCOUNTER — VIRTUAL VISIT (OUTPATIENT)
Dept: EDUCATION SERVICES | Facility: CLINIC | Age: 54
End: 2024-05-21
Payer: COMMERCIAL

## 2024-05-21 DIAGNOSIS — Z94.4 LIVER REPLACED BY TRANSPLANT (H): Chronic | ICD-10-CM

## 2024-05-21 DIAGNOSIS — E11.9 TYPE 2 DIABETES MELLITUS WITHOUT COMPLICATION, WITH LONG-TERM CURRENT USE OF INSULIN (H): Primary | Chronic | ICD-10-CM

## 2024-05-21 DIAGNOSIS — Z79.4 TYPE 2 DIABETES MELLITUS WITHOUT COMPLICATION, WITH LONG-TERM CURRENT USE OF INSULIN (H): Primary | Chronic | ICD-10-CM

## 2024-05-21 PROCEDURE — 99207 PR NO BILLABLE SERVICE THIS VISIT: CPT | Mod: 95 | Performed by: NUTRITIONIST

## 2024-05-21 RX ORDER — TACROLIMUS 1 MG/1
4 CAPSULE ORAL EVERY 12 HOURS
Qty: 720 CAPSULE | Refills: 3 | Status: SHIPPED | OUTPATIENT
Start: 2024-05-21 | End: 2024-05-24 | Stop reason: DRUGHIGH

## 2024-05-21 RX ORDER — TACROLIMUS 5 MG/1
5 CAPSULE ORAL EVERY 12 HOURS
Qty: 180 CAPSULE | Refills: 3 | Status: SHIPPED | OUTPATIENT
Start: 2024-05-21 | End: 2024-05-24

## 2024-05-21 NOTE — PROGRESS NOTES
Virtual Visit Details    Type of service:  Video Visit     Originating Location (pt. Location): Home    Distant Location (provider location):  On-site  Platform used for Video Visit: Yenifer    Diabetes Self-Management Education & Support    Maryclement Smith Jessica presents today for education related to Type 2 diabetes    Patient is being treated with:  insulin  He is accompanied by spouse    Year of diagnosis: 2023  Referring provider:  Lai  Patient is followed by Suzanne THOMPSON, now followed by Geovanna THOMPSON  Living Situation: with spouse   Employment: n/a    PATIENT CONCERNS RELATED TO DIABETES SELF MANAGEMENT:       ASSESSMENT:    Taking Medication:     Current Diabetes Management per Patient:  Taking diabetes medications? yes:     Diabetes Medication(s)       Diabetic Other       Glucagon (GVOKE HYPOPEN) 1 MG/0.2ML pen Inject the contents of 1 device under the skin into lower abdomen, outer thigh, or outer upper arm as needed for hypoglycemia. If no response after 15 minutes, additional 1 mg dose from a new device may be injected while waiting for emergency assistance.     Patient not taking: Reported on 5/15/2024       Insulin       insulin aspart (NOVOLOG PEN) 100 UNIT/ML pen Inject 1-10 Units Subcutaneous 3 times daily (before meals) Humalog 4 units with meals plus correction scale 1:50 >140 TID AC and 1:50 >200 HS Pre-meal Humalog correction scale: Do Not give Correction Insulin if Pre-Meal BG less than 140. For Pre-Meal  - 189 give 1 unit. For Pre-Meal  - 239 give 2 units. For Pre-Meal  - 289 give 3 units. For Pre-Meal  - 339 give 4 units. For Pre-Meal - 399 give 5 units. For Pre-Meal -449 give 6 units For Pre-Meal BG greater than or equal to 450 give 7 units. To be given with prandial insulin, and based on pre-meal blood glucose. Bedtime Humalog correction scale: Do Not give Bedtime Correction Insulin if BG less than 200. For  - 249 give 1 units.  For  - 299 give 2 units. For  - 349 give 3 units. For  -399 give 4 units. For BG greater than or equal to 400 give 5 units. Notify provider if glucose greater than or equal to 350 mg/dL after administration of correction dose.     Insulin Glargine-yfgn (SEMGLEE, YFGN,) 100 UNIT/ML SOLN Inject 5 Units Subcutaneous daily     Patient taking differently: Inject 5 Units Subcutaneous daily 3-5 units based off morning BG            Monitoring    See reports above    Taking semglee 18 units  Humalog 7 with breakfast, 6 with lunch and 5 units with dinner  1/50/140    Patient's most recent   Lab Results   Component Value Date    A1C 8.1 10/31/2023        Healthy Eating:   encouraged consistent carb diet    Problem Solving:      Patient is at risk of hypoglycemia?: YES  Hospitalizations for hyper or hypoglycemia: No      EDUCATION and INSTRUCTION PROVIDED AT THIS VISIT:    Reviewed dexcom reports. Patient had steroid injections in his shoulders and is now hyperglycemic. Improving since last increase in insulin.   We had previously been reducing his insulin doses due to hypoglycemia. He is taking 18 units semglee and 7/6/5 units novolog with meals.  Recommended 20 units semglee today and novolog 8/7/6 plus correction 1/50/140/200 but patient will likely need more than that.   They are still considering insulin pump, but now that Mary is eating and staying hydrated may want to consider Geovanna Ferreira's recommendation for Jardiance.   Patient-stated goal written and given to Mary Lopez.  Verbalized and demonstrated understanding of instructions.       FOLLOW-UP:    One week    Time spent with patient at today's visit was 20 minutes.      Any diabetes medication dose changes were made via the CDE Protocol and Collaborative Practice Agreement with [facility].  A copy of this encounter was provided to patient's referring provider.

## 2024-05-21 NOTE — NURSING NOTE
Spouse reported no changes to patient's chart information. E-check in was completed today prior to appointment. VF did not review e-check in information again with her due to this. Patient not present at the time of rooming process on [place].     Is the patient currently in the state of [place]? YES    Visit mode:VIDEO    If the visit is dropped, the patient can be reconnected by: Question not asked, connected already via [employer].    Will anyone else be joining the visit? Spouse  (If patient encounters technical issues they should call [phone])    How would you like to obtain your AVS? MyChart    Are changes needed to the allergy or medication list? No spouse stated no changes    Are refills needed on medications prescribed by this physician? Unknown, spouse didn't verbalize any refills were needed.    Reason for visit: RECHECK    Autumn CHRISTY

## 2024-05-21 NOTE — TELEPHONE ENCOUNTER
Tac level 4.8 on 5/20.  Goal 10.  Current dose 8 bid.  Adina will increase to 9 mg bid.   I encouraged hydration.  Note to pharmacy that he needs this today!

## 2024-05-23 ENCOUNTER — TELEPHONE (OUTPATIENT)
Dept: TRANSPLANT | Facility: CLINIC | Age: 54
End: 2024-05-23

## 2024-05-23 ENCOUNTER — TELEPHONE (OUTPATIENT)
Dept: FAMILY MEDICINE | Facility: CLINIC | Age: 54
End: 2024-05-23

## 2024-05-23 ENCOUNTER — TELEPHONE (OUTPATIENT)
Dept: PHARMACY | Facility: CLINIC | Age: 54
End: 2024-05-23

## 2024-05-23 ENCOUNTER — LAB (OUTPATIENT)
Dept: LAB | Facility: CLINIC | Age: 54
End: 2024-05-23
Payer: COMMERCIAL

## 2024-05-23 DIAGNOSIS — Z94.4 LIVER REPLACED BY TRANSPLANT (H): ICD-10-CM

## 2024-05-23 DIAGNOSIS — R63.0 POOR APPETITE: ICD-10-CM

## 2024-05-23 DIAGNOSIS — E83.42 HYPOMAGNESEMIA: ICD-10-CM

## 2024-05-23 LAB
ALBUMIN SERPL BCG-MCNC: 3.6 G/DL (ref 3.5–5.2)
ALP SERPL-CCNC: 91 U/L (ref 40–150)
ALT SERPL W P-5'-P-CCNC: 9 U/L (ref 0–70)
ANION GAP SERPL CALCULATED.3IONS-SCNC: 7 MMOL/L (ref 7–15)
AST SERPL W P-5'-P-CCNC: 21 U/L (ref 0–45)
BILIRUB DIRECT SERPL-MCNC: <0.2 MG/DL (ref 0–0.3)
BILIRUB SERPL-MCNC: 0.4 MG/DL
BUN SERPL-MCNC: 18 MG/DL (ref 6–20)
CALCIUM SERPL-MCNC: 9.6 MG/DL (ref 8.6–10)
CHLORIDE SERPL-SCNC: 106 MMOL/L (ref 98–107)
CREAT SERPL-MCNC: 1.13 MG/DL (ref 0.67–1.17)
DEPRECATED HCO3 PLAS-SCNC: 27 MMOL/L (ref 22–29)
EGFRCR SERPLBLD CKD-EPI 2021: 78 ML/MIN/1.73M2
ERYTHROCYTE [DISTWIDTH] IN BLOOD BY AUTOMATED COUNT: 14.7 % (ref 10–15)
GLUCOSE SERPL-MCNC: 224 MG/DL (ref 70–99)
HCT VFR BLD AUTO: 34 % (ref 40–53)
HGB BLD-MCNC: 11.2 G/DL (ref 13.3–17.7)
MAGNESIUM SERPL-MCNC: 2.1 MG/DL (ref 1.7–2.3)
MCH RBC QN AUTO: 30.4 PG (ref 26.5–33)
MCHC RBC AUTO-ENTMCNC: 32.9 G/DL (ref 31.5–36.5)
MCV RBC AUTO: 92 FL (ref 78–100)
PHOSPHATE SERPL-MCNC: 3.5 MG/DL (ref 2.5–4.5)
PLATELET # BLD AUTO: 185 10E3/UL (ref 150–450)
POTASSIUM SERPL-SCNC: 4.2 MMOL/L (ref 3.4–5.3)
PROT SERPL-MCNC: 6.1 G/DL (ref 6.4–8.3)
RBC # BLD AUTO: 3.69 10E6/UL (ref 4.4–5.9)
SODIUM SERPL-SCNC: 140 MMOL/L (ref 135–145)
TACROLIMUS BLD-MCNC: 6 UG/L (ref 5–15)
TME LAST DOSE: NORMAL H
TME LAST DOSE: NORMAL H
WBC # BLD AUTO: 4 10E3/UL (ref 4–11)

## 2024-05-23 PROCEDURE — 84100 ASSAY OF PHOSPHORUS: CPT

## 2024-05-23 PROCEDURE — 80197 ASSAY OF TACROLIMUS: CPT

## 2024-05-23 PROCEDURE — 36415 COLL VENOUS BLD VENIPUNCTURE: CPT

## 2024-05-23 PROCEDURE — 83735 ASSAY OF MAGNESIUM: CPT

## 2024-05-23 PROCEDURE — 85027 COMPLETE CBC AUTOMATED: CPT

## 2024-05-23 PROCEDURE — 80053 COMPREHEN METABOLIC PANEL: CPT

## 2024-05-23 PROCEDURE — 82248 BILIRUBIN DIRECT: CPT

## 2024-05-23 RX ORDER — MIRTAZAPINE 15 MG/1
30 TABLET, FILM COATED ORAL AT BEDTIME
Qty: 60 TABLET | Refills: 0 | Status: CANCELLED | OUTPATIENT
Start: 2024-05-23

## 2024-05-23 RX ORDER — MIRTAZAPINE 15 MG/1
15 TABLET, FILM COATED ORAL AT BEDTIME
Qty: 60 TABLET | Refills: 0 | Status: SHIPPED | OUTPATIENT
Start: 2024-05-23 | End: 2024-06-13

## 2024-05-23 RX ORDER — MAGNESIUM GLYCINATE 100 MG
100 CAPSULE ORAL 3 TIMES DAILY
Qty: 120 CAPSULE | Refills: 2 | Status: SHIPPED | OUTPATIENT
Start: 2024-05-23 | End: 2024-06-11

## 2024-05-23 NOTE — TELEPHONE ENCOUNTER
Pt is requesting new rx with taper instructions for    Mirtazapine 15mg tabs    Please verify and send new rx. Thank you!    [facility] spec/mail pharmacy  [phone]

## 2024-05-23 NOTE — TELEPHONE ENCOUNTER
Forms/Letter Request    Type of form/letter: [facility]    Do we have the form/letter: Yes: Placed in providers in box for review    Where did/will the form come from? form was faxed in    When is form/letter needed by: ASAP    How would you like the form/letter returned: Fax : [phone]

## 2024-05-23 NOTE — TELEPHONE ENCOUNTER
Message from pharmacy asking about tapering mirtazipine.   Call to Adina who says Mary's appetite is better.  I suggested she decrease to 15 mg po daily and stop after a week if eating well.  REviewed meds.  Adina will decrease mag to 100 mg tid and decrease mirtazipine as above.

## 2024-05-23 NOTE — TELEPHONE ENCOUNTER
Clinical Pharmacy Consult:                                                      Transplant Specific: 2 Month Post Transplant Medication Review  Date of Transplant: 03/05/2024  Type of Transplant: liver  First Transplant: yes  History of rejection: no    Immunosuppression Regimen   TAC 9 mg qAM & 9 mg qPM, Myforitic 540 mg qAM & 540 mg qPM, & Prednisone 5 mg once daily  Patient specific goal: 8-10  Most recent level: 4.8 on 5/19/2024  Immunosuppressant Levels:  Subtherapeutic, dose increased  Pt adherent to lab draws: yes  Scr:   Lab Results   Component Value Date    CR 1.44 04/11/2024    CR 0.95 07/05/2020     Side effects:     Prophylactic Medications  Antibacterial:  Bactrim SS daily  Scheduled Discontinue Date: 6 months Anticipate stop 9/5/2024    Antifungal: Nystatin  Scheduled Discontinue Date:  Discontinued 3/9/24    Antiviral: Max dose in Liver transplant is Valcyte 450mg once daily   Scheduled Discontinue Date: 3 months Anticipate stop 6/5/24    Acid Reducer: Prilosec (omeprazole)  Scheduled Reviewed Date:  MD Determination    Thrombosis Prevention: Aspirin 325 mg PO daily  Scheduled Discontinue Date:  MD Determination    Blood Pressure Management  Frequency of home Blood Pressure checks: not checking at home  Most recent home BP: 131/88 in clinic  Patient Blood pressure goal: <150/90  Patient blood pressure at goal:  unknown  Hospitalizations/ER visits since last assessment: 0    Med rec/DUR performed: yes  Med Rec Discrepancies: yes, magnesium increased to 200 mg three times daily, stopped melatonin, had not started mirtazapine taper.     Spoke with Rosio via phone today. Overall, Mary is doing well at home. Tolerating immunosuppression, no reported side effects today, no N/V/D, brain fog, tremor. Last tacrolimus level low, dose increased. She states she was unaware Mary can start tapering his mirtazapine if his appetite is steady.     They are using the medication card and keeping it up to date.  Rosio arranges Mary's pill organizer for him. Encouraged to get Mary involved in arranging pillbox to promote independence. She denies missed doses. Reports no issues obtaining medications. She has phone alarms set at 0700/1300/1900/2115.  supplements from rejection meds.     Blood pressure: Rosio admits they are not checking it at home. Last office visit was 131/88 mmHg. Encouraged to get into a daily habit for checking.     Blood glucose: Rosio feels Mary is all over the place. Reports current doses: Semglee 20 units daily, Novolog 8 units with breakfast, 7 units with lunch, 6 units with dinner, plus correction scale. Working with Endo to get better control. She would like for Mary to get on an insulin pump.     No additional questions or concerns. Follow up: 1 month with pharmacy.    Time spent: 25 minutes    Alan Hernadez, PharmD, BCACP  [facility]  [address] [place] [id number]  Specialty - [phone]  Transplant - [phone]  Mail - [phone]     None known

## 2024-05-24 ENCOUNTER — TELEPHONE (OUTPATIENT)
Dept: TRANSPLANT | Facility: CLINIC | Age: 54
End: 2024-05-24
Payer: COMMERCIAL

## 2024-05-24 DIAGNOSIS — Z94.4 LIVER REPLACED BY TRANSPLANT (H): Chronic | ICD-10-CM

## 2024-05-24 RX ORDER — TACROLIMUS 5 MG/1
10 CAPSULE ORAL EVERY 12 HOURS
Qty: 360 CAPSULE | Refills: 3 | Status: SHIPPED | OUTPATIENT
Start: 2024-05-24 | End: 2024-05-29

## 2024-05-24 NOTE — TELEPHONE ENCOUNTER
ISSUE:   Tacrolimus IR level 6 on 5/23, goal 10, dose 9 mg BID.    PLAN:   Call Patient and confirm this was an accurate 12-hour trough.   Verify Tacrolimus IR dose 9 mg BID.   Confirm no new medications or or missed doses.   Confirm no new illness / infection / diarrhea.   If accurate trough and accurate dose, increase Tacrolimus IR dose to 10 mg BID     Is this more than a 50% increase or decrease in current IS dose: Yes  If YES, justification: below goal    Repeat labs in on Tuesday due to holiday .  *If > 50% change in immunosuppression dose, repeat labs in 1 week.   Isabella Quiñonez, BRIDGET      OUTCOME:   Spoke with Rosio, they confirm accurate trough level and current dose 9 mg BID.   Rosio confirmed dose change to 10 mg BID.  Rosio agreed to repeat labs on Tuesday. .   Orders sent to [facility] pharmacy for dose change and lab for repeat labs.   Rosio voiced understanding of plan.     Valentina Enamorado LPN

## 2024-05-28 ENCOUNTER — TELEPHONE (OUTPATIENT)
Dept: TRANSPLANT | Facility: CLINIC | Age: 54
End: 2024-05-28

## 2024-05-28 ENCOUNTER — LAB (OUTPATIENT)
Dept: LAB | Facility: CLINIC | Age: 54
End: 2024-05-28
Payer: COMMERCIAL

## 2024-05-28 ENCOUNTER — VIRTUAL VISIT (OUTPATIENT)
Dept: EDUCATION SERVICES | Facility: CLINIC | Age: 54
End: 2024-05-28
Payer: COMMERCIAL

## 2024-05-28 VITALS — BODY MASS INDEX: 28.76 KG/M2 | WEIGHT: 185 LBS

## 2024-05-28 DIAGNOSIS — Z94.4 LIVER REPLACED BY TRANSPLANT (H): Primary | Chronic | ICD-10-CM

## 2024-05-28 DIAGNOSIS — Z94.4 LIVER REPLACED BY TRANSPLANT (H): ICD-10-CM

## 2024-05-28 DIAGNOSIS — E11.9 TYPE 2 DIABETES MELLITUS WITHOUT COMPLICATION, WITH LONG-TERM CURRENT USE OF INSULIN (H): Primary | Chronic | ICD-10-CM

## 2024-05-28 DIAGNOSIS — E87.5 HYPERKALEMIA: ICD-10-CM

## 2024-05-28 DIAGNOSIS — K86.9 DIABETES MELLITUS ASSOCIATED WITH PANCREATIC DISEASE (H): Primary | ICD-10-CM

## 2024-05-28 DIAGNOSIS — E11.69 DIABETES MELLITUS ASSOCIATED WITH PANCREATIC DISEASE (H): Primary | ICD-10-CM

## 2024-05-28 DIAGNOSIS — Z79.4 TYPE 2 DIABETES MELLITUS WITHOUT COMPLICATION, WITH LONG-TERM CURRENT USE OF INSULIN (H): Primary | Chronic | ICD-10-CM

## 2024-05-28 LAB
ALBUMIN SERPL BCG-MCNC: 3.8 G/DL (ref 3.5–5.2)
ALP SERPL-CCNC: 127 U/L (ref 40–150)
ALT SERPL W P-5'-P-CCNC: 31 U/L (ref 0–70)
ANION GAP SERPL CALCULATED.3IONS-SCNC: 9 MMOL/L (ref 7–15)
AST SERPL W P-5'-P-CCNC: 32 U/L (ref 0–45)
BILIRUB DIRECT SERPL-MCNC: <0.2 MG/DL (ref 0–0.3)
BILIRUB SERPL-MCNC: 0.3 MG/DL
BUN SERPL-MCNC: 18.7 MG/DL (ref 6–20)
CALCIUM SERPL-MCNC: 9.8 MG/DL (ref 8.6–10)
CHLORIDE SERPL-SCNC: 106 MMOL/L (ref 98–107)
CREAT SERPL-MCNC: 1.05 MG/DL (ref 0.67–1.17)
DEPRECATED HCO3 PLAS-SCNC: 27 MMOL/L (ref 22–29)
EGFRCR SERPLBLD CKD-EPI 2021: 85 ML/MIN/1.73M2
ERYTHROCYTE [DISTWIDTH] IN BLOOD BY AUTOMATED COUNT: 14.8 % (ref 10–15)
GLUCOSE SERPL-MCNC: 189 MG/DL (ref 70–99)
HCT VFR BLD AUTO: 35.9 % (ref 40–53)
HGB BLD-MCNC: 11.8 G/DL (ref 13.3–17.7)
MAGNESIUM SERPL-MCNC: 1.6 MG/DL (ref 1.7–2.3)
MCH RBC QN AUTO: 30.6 PG (ref 26.5–33)
MCHC RBC AUTO-ENTMCNC: 32.9 G/DL (ref 31.5–36.5)
MCV RBC AUTO: 93 FL (ref 78–100)
PHOSPHATE SERPL-MCNC: 3.2 MG/DL (ref 2.5–4.5)
PLATELET # BLD AUTO: 191 10E3/UL (ref 150–450)
POTASSIUM SERPL-SCNC: 3.4 MMOL/L (ref 3.4–5.3)
PROT SERPL-MCNC: 6.4 G/DL (ref 6.4–8.3)
RBC # BLD AUTO: 3.86 10E6/UL (ref 4.4–5.9)
SODIUM SERPL-SCNC: 142 MMOL/L (ref 135–145)
TACROLIMUS BLD-MCNC: 7.7 UG/L (ref 5–15)
TME LAST DOSE: NORMAL H
TME LAST DOSE: NORMAL H
WBC # BLD AUTO: 3.6 10E3/UL (ref 4–11)

## 2024-05-28 PROCEDURE — 36415 COLL VENOUS BLD VENIPUNCTURE: CPT

## 2024-05-28 PROCEDURE — 82248 BILIRUBIN DIRECT: CPT

## 2024-05-28 PROCEDURE — 84100 ASSAY OF PHOSPHORUS: CPT

## 2024-05-28 PROCEDURE — 80053 COMPREHEN METABOLIC PANEL: CPT

## 2024-05-28 PROCEDURE — 80197 ASSAY OF TACROLIMUS: CPT

## 2024-05-28 PROCEDURE — 85027 COMPLETE CBC AUTOMATED: CPT

## 2024-05-28 PROCEDURE — 83735 ASSAY OF MAGNESIUM: CPT

## 2024-05-28 PROCEDURE — 99207 PR NO BILLABLE SERVICE THIS VISIT: CPT | Mod: 95 | Performed by: NUTRITIONIST

## 2024-05-28 NOTE — TELEPHONE ENCOUNTER
Potassium 3.4.  on florinef.  Please ask Mary to decrease florinef to every other day.  Needs to repeat metabolic panel next week.  If potassium normal will stop florinef.   All transplant labs every other week.

## 2024-05-28 NOTE — TELEPHONE ENCOUNTER
Instructed pt to reduce florinef to every other day and check BMP in  a week. Pt able to repeat orders.

## 2024-05-28 NOTE — PROGRESS NOTES
Virtual Visit Details    Type of service:  Video Visit     Originating Location (pt. Location): Home    Distant Location (provider location):  On-site  Platform used for Video Visit: Yenifer    Diabetes Self-Management Education & Support    Maryclement Smith Jessica presents today for education related to Type 2 diabetes    Patient is being treated with:  insulin  He is accompanied by spouse    Year of diagnosis: 2023  Referring provider:  Lai  Patient is followed by Suzanne THOMPSON, now followed by Geovanna THOMPSON  Living Situation: with spouse   Employment: n/a    PATIENT CONCERNS RELATED TO DIABETES SELF MANAGEMENT:       ASSESSMENT:    Taking Medication:     Current Diabetes Management per Patient:  Taking diabetes medications? yes:     Diabetes Medication(s)       Diabetic Other       Glucagon (GVOKE HYPOPEN) 1 MG/0.2ML pen Inject the contents of 1 device under the skin into lower abdomen, outer thigh, or outer upper arm as needed for hypoglycemia. If no response after 15 minutes, additional 1 mg dose from a new device may be injected while waiting for emergency assistance.     Patient not taking: Reported on 5/15/2024       Insulin       insulin aspart (NOVOLOG PEN) 100 UNIT/ML pen Inject 1-10 Units Subcutaneous 3 times daily (before meals) Humalog 4 units with meals plus correction scale 1:50 >140 TID AC and 1:50 >200 HS Pre-meal Humalog correction scale: Do Not give Correction Insulin if Pre-Meal BG less than 140. For Pre-Meal  - 189 give 1 unit. For Pre-Meal  - 239 give 2 units. For Pre-Meal  - 289 give 3 units. For Pre-Meal  - 339 give 4 units. For Pre-Meal - 399 give 5 units. For Pre-Meal -449 give 6 units For Pre-Meal BG greater than or equal to 450 give 7 units. To be given with prandial insulin, and based on pre-meal blood glucose. Bedtime Humalog correction scale: Do Not give Bedtime Correction Insulin if BG less than 200. For  - 249 give 1 units. For  -  299 give 2 units. For  - 349 give 3 units. For  -399 give 4 units. For BG greater than or equal to 400 give 5 units. Notify provider if glucose greater than or equal to 350 mg/dL after administration of correction dose.     Insulin Glargine-yfgn (SEMGLEE, YFGN,) 100 UNIT/ML SOLN Inject 5 Units Subcutaneous daily     Patient taking differently: Inject 5 Units Subcutaneous daily 3-5 units based off morning BG            Monitoring          Taking semglee 20 units  Humalog 8 with breakfast, 7 with lunch and 6 units with dinner  1/50/140    Patient's most recent   Lab Results   Component Value Date    A1C 8.1 10/31/2023        Healthy Eating:   encouraged consistent carb diet    Problem Solving:      Patient is at risk of hypoglycemia?: YES  Hospitalizations for hyper or hypoglycemia: No      EDUCATION and INSTRUCTION PROVIDED AT THIS VISIT:    Reviewed dexcom reports. Patient had steroid injections in his shoulders and is now hyperglycemic. Improving since last increase in insulin.   We had previously been reducing his insulin doses due to hypoglycemia. He is taking 20 units semglee and 8/7/6 units novolog with meals.    Increase semglee to 24 units. If fasting glucose remains above 150 for the next three days increase to 26 units.     Now that Mary is eating and staying hydrated may want to consider Geovanna Ferreira's recommendation for Jardiance. Will discuss with her next week.  Mary is not sure he wants an insulin pump. Showed him [employer] and he is interested in that.  Patient-stated goal written and given to Mary Luis Lopez.  Verbalized and demonstrated understanding of instructions.       FOLLOW-UP:    One week  Joined the call at 5/28/2024, 2:11:03 pm.  Left the call at 5/28/2024, 2:38:58 pm.  You were on the call for 27 minutes 54 seconds  Time spent with patient at today's visit was 27 minutes.      Any diabetes medication dose changes were made via the CDE Protocol and Collaborative Practice  Agreement with [place] and  [facility].  A copy of this encounter was provided to patient's referring provider.

## 2024-05-29 ENCOUNTER — MYC MEDICAL ADVICE (OUTPATIENT)
Dept: ORTHOPEDICS | Facility: CLINIC | Age: 54
End: 2024-05-29
Payer: COMMERCIAL

## 2024-05-29 ENCOUNTER — TELEPHONE (OUTPATIENT)
Dept: TRANSPLANT | Facility: CLINIC | Age: 54
End: 2024-05-29
Payer: COMMERCIAL

## 2024-05-29 DIAGNOSIS — Z94.4 LIVER REPLACED BY TRANSPLANT (H): Chronic | ICD-10-CM

## 2024-05-29 RX ORDER — TACROLIMUS 5 MG/1
10 CAPSULE ORAL EVERY 12 HOURS
Qty: 360 CAPSULE | Refills: 3 | Status: SHIPPED | OUTPATIENT
Start: 2024-05-29 | End: 2024-06-12

## 2024-05-29 RX ORDER — TACROLIMUS 1 MG/1
1 CAPSULE ORAL EVERY 12 HOURS
Qty: 180 CAPSULE | Refills: 3 | Status: SHIPPED | OUTPATIENT
Start: 2024-05-29 | End: 2024-06-12 | Stop reason: DRUGHIGH

## 2024-05-29 NOTE — TELEPHONE ENCOUNTER
ISSUE:   Tacrolimus IR level 7.7 on 5/28, goal 10, dose 10 mg BID.    PLAN:   Call Patient and confirm this was an accurate 12-hour trough.   Verify Tacrolimus IR dose.  Confirm no new medications or or missed doses.   Confirm no new illness / infection / diarrhea.   If accurate trough and accurate dose, increase Tacrolimus IR dose to 11 mg BID     repeat labs in 1 week.   Lore Mckeon RN     OUTCOME:   Spoke with Rosio, they confirm accurate trough level and current dose 10 mg BID.   Rosio confirmed dose change to 11 mg BID.  Rosio agreed to repeat labs in 1 weeks.   Orders sent to preferred pharmacy for dose change and lab for repeat labs.   Rosio voiced understanding of plan.     Valentina Enamorado LPN

## 2024-06-03 ENCOUNTER — LAB (OUTPATIENT)
Dept: LAB | Facility: CLINIC | Age: 54
End: 2024-06-03
Payer: COMMERCIAL

## 2024-06-03 DIAGNOSIS — Z94.4 LIVER REPLACED BY TRANSPLANT (H): ICD-10-CM

## 2024-06-03 LAB
ALBUMIN SERPL BCG-MCNC: 3.8 G/DL (ref 3.5–5.2)
ALP SERPL-CCNC: 85 U/L (ref 40–150)
ALT SERPL W P-5'-P-CCNC: 24 U/L (ref 0–70)
ANION GAP SERPL CALCULATED.3IONS-SCNC: 6 MMOL/L (ref 7–15)
AST SERPL W P-5'-P-CCNC: 25 U/L (ref 0–45)
BILIRUB DIRECT SERPL-MCNC: 0.21 MG/DL (ref 0–0.3)
BILIRUB SERPL-MCNC: 0.4 MG/DL
BUN SERPL-MCNC: 15.8 MG/DL (ref 6–20)
CALCIUM SERPL-MCNC: 9.8 MG/DL (ref 8.6–10)
CHLORIDE SERPL-SCNC: 105 MMOL/L (ref 98–107)
CREAT SERPL-MCNC: 1.08 MG/DL (ref 0.67–1.17)
DEPRECATED HCO3 PLAS-SCNC: 29 MMOL/L (ref 22–29)
EGFRCR SERPLBLD CKD-EPI 2021: 83 ML/MIN/1.73M2
ERYTHROCYTE [DISTWIDTH] IN BLOOD BY AUTOMATED COUNT: 14.5 % (ref 10–15)
GLUCOSE SERPL-MCNC: 136 MG/DL (ref 70–99)
HCT VFR BLD AUTO: 35.7 % (ref 40–53)
HGB BLD-MCNC: 11.9 G/DL (ref 13.3–17.7)
MAGNESIUM SERPL-MCNC: 1.9 MG/DL (ref 1.7–2.3)
MCH RBC QN AUTO: 31 PG (ref 26.5–33)
MCHC RBC AUTO-ENTMCNC: 33.3 G/DL (ref 31.5–36.5)
MCV RBC AUTO: 93 FL (ref 78–100)
PHOSPHATE SERPL-MCNC: 4.1 MG/DL (ref 2.5–4.5)
PLATELET # BLD AUTO: 152 10E3/UL (ref 150–450)
POTASSIUM SERPL-SCNC: 4.4 MMOL/L (ref 3.4–5.3)
PROT SERPL-MCNC: 6 G/DL (ref 6.4–8.3)
RBC # BLD AUTO: 3.84 10E6/UL (ref 4.4–5.9)
SODIUM SERPL-SCNC: 140 MMOL/L (ref 135–145)
TACROLIMUS BLD-MCNC: 9.6 UG/L (ref 5–15)
TME LAST DOSE: NORMAL H
TME LAST DOSE: NORMAL H
WBC # BLD AUTO: 3.3 10E3/UL (ref 4–11)

## 2024-06-03 PROCEDURE — 85027 COMPLETE CBC AUTOMATED: CPT

## 2024-06-03 PROCEDURE — 80197 ASSAY OF TACROLIMUS: CPT

## 2024-06-03 PROCEDURE — 36415 COLL VENOUS BLD VENIPUNCTURE: CPT

## 2024-06-03 PROCEDURE — 80053 COMPREHEN METABOLIC PANEL: CPT

## 2024-06-03 PROCEDURE — 83735 ASSAY OF MAGNESIUM: CPT

## 2024-06-03 PROCEDURE — 82248 BILIRUBIN DIRECT: CPT

## 2024-06-03 PROCEDURE — 84100 ASSAY OF PHOSPHORUS: CPT

## 2024-06-04 ENCOUNTER — MYC REFILL (OUTPATIENT)
Dept: FAMILY MEDICINE | Facility: CLINIC | Age: 54
End: 2024-06-04

## 2024-06-04 ENCOUNTER — VIRTUAL VISIT (OUTPATIENT)
Dept: EDUCATION SERVICES | Facility: CLINIC | Age: 54
End: 2024-06-04
Payer: COMMERCIAL

## 2024-06-04 VITALS — WEIGHT: 191 LBS | BODY MASS INDEX: 29.69 KG/M2

## 2024-06-04 DIAGNOSIS — Z94.4 LIVER REPLACED BY TRANSPLANT (H): Chronic | ICD-10-CM

## 2024-06-04 DIAGNOSIS — E11.9 TYPE 2 DIABETES MELLITUS WITHOUT COMPLICATION, WITH LONG-TERM CURRENT USE OF INSULIN (H): Primary | Chronic | ICD-10-CM

## 2024-06-04 DIAGNOSIS — Z79.4 TYPE 2 DIABETES MELLITUS WITHOUT COMPLICATION, WITH LONG-TERM CURRENT USE OF INSULIN (H): Primary | Chronic | ICD-10-CM

## 2024-06-04 PROCEDURE — 99207 PR NO BILLABLE SERVICE THIS VISIT: CPT | Mod: 95 | Performed by: NUTRITIONIST

## 2024-06-04 NOTE — NURSING NOTE
Is the patient currently in the state of [place]? YES    Visit mode:VIDEO    If the visit is dropped, the patient can be reconnected by: VIDEO VISIT: Text to cell phone:   Telephone Information:   Mobile [phone]   Mobile [phone]       Will anyone else be joining the visit? NO  (If patient encounters technical issues they should call [phone]    How would you like to obtain your AVS? MyChart    Are changes needed to the allergy or medication list? No    Are refills needed on medications prescribed by this physician? NO    Reason for visit: RECHECK and Video Visit    Alena CHRISTY

## 2024-06-04 NOTE — PROGRESS NOTES
Virtual Visit Details    Type of service:  Video Visit     Originating Location (pt. Location): Home    Distant Location (provider location):  On-site  Platform used for Video Visit: Yenifer    Joined the call at 6/4/2024, 1:27:42 pm.  Left the call at 6/4/2024, 2:03:34 pm.  You were on the call for 35 minutes 51 seconds .    Diabetes Self-Management Education & Support    Mary Lopez presents today for education related to Type 2 diabetes    Patient is being treated with:  insulin  He is accompanied by spouse    Year of diagnosis: 2023  Referring provider:  Lai  Patient is followed by Suzanne THOMPSON, now followed by Geovanna THOMPSON  Living Situation: with spouse   Employment: n/a    PATIENT CONCERNS RELATED TO DIABETES SELF MANAGEMENT:       ASSESSMENT:    Taking Medication:     Current Diabetes Management per Patient:  Taking diabetes medications? yes:     Diabetes Medication(s)       Diabetic Other       Glucagon (GVOKE HYPOPEN) 1 MG/0.2ML pen Inject the contents of 1 device under the skin into lower abdomen, outer thigh, or outer upper arm as needed for hypoglycemia. If no response after 15 minutes, additional 1 mg dose from a new device may be injected while waiting for emergency assistance.     Patient not taking: Reported on 5/15/2024       Insulin       insulin aspart (NOVOLOG PEN) 100 UNIT/ML pen Inject 1-10 Units Subcutaneous 3 times daily (before meals) Humalog 4 units with meals plus correction scale 1:50 >140 TID AC and 1:50 >200 HS Pre-meal Humalog correction scale: Do Not give Correction Insulin if Pre-Meal BG less than 140. For Pre-Meal  - 189 give 1 unit. For Pre-Meal  - 239 give 2 units. For Pre-Meal  - 289 give 3 units. For Pre-Meal  - 339 give 4 units. For Pre-Meal - 399 give 5 units. For Pre-Meal -449 give 6 units For Pre-Meal BG greater than or equal to 450 give 7 units. To be given with prandial insulin, and based on pre-meal blood glucose.  Bedtime Humalog correction scale: Do Not give Bedtime Correction Insulin if BG less than 200. For  - 249 give 1 units. For  - 299 give 2 units. For  - 349 give 3 units. For  -399 give 4 units. For BG greater than or equal to 400 give 5 units. Notify provider if glucose greater than or equal to 350 mg/dL after administration of correction dose.     Insulin Glargine-yfgn (SEMGLEE, YFGN,) 100 UNIT/ML SOLN Inject 5 Units Subcutaneous daily     Patient taking differently: Inject 5 Units Subcutaneous daily 3-5 units based off morning BG            Monitoring            Taking semglee 24 units  Humalog 8 with breakfast, 7 with lunch and 6 units with dinner  1/50/140    Patient's most recent   Lab Results   Component Value Date    A1C 8.1 10/31/2023        Healthy Eating:   encouraged consistent carb diet    Problem Solving:      Patient is at risk of hypoglycemia?: YES  Hospitalizations for hyper or hypoglycemia: No      EDUCATION and INSTRUCTION PROVIDED AT THIS VISIT:    Reviewed dexcom reports. Patient continues to have high glucose particularly post prandial and he is missing meal doses and sometimes having delayed doses as well. Mary isn't sure he wants to get a pump or a [employer] Simplicity patch. He and spouse need to discuss more. I talked with Mary today about the importance of taking his insulin injections ten minutes before he eats. He does not want to carb count so will continue a fixed insulin dose approach for now.     Discussed patient's case with Geovanna Ferreira. See plan established with her. Called Mary and Adina to review plan. They verbalized understanding. We discussed possible side effects. Importance of staying hydrated. And to contact clinic if any hypoglycemia or side effects occur.     Plan:   Start Jardiance 25 mg  Decrease rapid acting insulin to 4 units breakfast, 3 with lunch and 3 with dinner  Ok to increase semglee to 26 units    Patient-stated goal written and given to  Mary Lopez.  Verbalized and demonstrated understanding of instructions.       FOLLOW-UP:    Scheduled     Any diabetes medication dose changes were made via the CDE Protocol and Collaborative Practice Agreement with [facility] and  [facility].  A copy of this encounter was provided to patient's referring provider.

## 2024-06-05 ENCOUNTER — MYC MEDICAL ADVICE (OUTPATIENT)
Dept: FAMILY MEDICINE | Facility: CLINIC | Age: 54
End: 2024-06-05
Payer: COMMERCIAL

## 2024-06-05 DIAGNOSIS — Z79.4 TYPE 2 DIABETES MELLITUS WITH HYPERGLYCEMIA, WITH LONG-TERM CURRENT USE OF INSULIN (H): Primary | ICD-10-CM

## 2024-06-05 DIAGNOSIS — G47.00 PERSISTENT INSOMNIA: Primary | Chronic | ICD-10-CM

## 2024-06-05 DIAGNOSIS — E11.65 TYPE 2 DIABETES MELLITUS WITH HYPERGLYCEMIA, WITH LONG-TERM CURRENT USE OF INSULIN (H): Primary | ICD-10-CM

## 2024-06-05 PROCEDURE — 99207 PR NO BILLABLE SERVICE THIS VISIT: CPT | Performed by: NUTRITIONIST

## 2024-06-05 RX ORDER — TRAMADOL HYDROCHLORIDE 50 MG/1
50 TABLET ORAL DAILY PRN
Qty: 30 TABLET | Refills: 0 | Status: SHIPPED | OUTPATIENT
Start: 2024-06-05 | End: 2024-07-02

## 2024-06-05 RX ORDER — TRAZODONE HYDROCHLORIDE 50 MG/1
50 TABLET, FILM COATED ORAL AT BEDTIME
Qty: 60 TABLET | Refills: 0 | Status: SHIPPED | OUTPATIENT
Start: 2024-06-05 | End: 2024-06-21

## 2024-06-05 NOTE — TELEPHONE ENCOUNTER
Patient has upcoming appointment with PCP on 6/21. Routing to provider to review and advise.    Routing refill request to provider for review/approval because:  Drug not active on patient's medication list    Celsa Jiménez, EDDAN, RN   [facility]

## 2024-06-05 NOTE — TELEPHONE ENCOUNTER
I will send a prescription for trazodone 50 mg to be taken at bedtime.  He is already on mirtazapine 15 mg which also helps with sleep.  Will discuss this further during the upcoming clinic visit.  Short prescription has been sent to pharmacy.

## 2024-06-11 ENCOUNTER — DOCUMENTATION ONLY (OUTPATIENT)
Dept: TRANSPLANT | Facility: CLINIC | Age: 54
End: 2024-06-11

## 2024-06-11 ENCOUNTER — LAB (OUTPATIENT)
Dept: LAB | Facility: CLINIC | Age: 54
End: 2024-06-11
Payer: COMMERCIAL

## 2024-06-11 DIAGNOSIS — Z94.4 LIVER REPLACED BY TRANSPLANT (H): ICD-10-CM

## 2024-06-11 LAB
ALBUMIN SERPL BCG-MCNC: 4.2 G/DL (ref 3.5–5.2)
ALP SERPL-CCNC: 109 U/L (ref 40–150)
ALT SERPL W P-5'-P-CCNC: 19 U/L (ref 0–70)
ANION GAP SERPL CALCULATED.3IONS-SCNC: 7 MMOL/L (ref 7–15)
AST SERPL W P-5'-P-CCNC: 27 U/L (ref 0–45)
BILIRUB DIRECT SERPL-MCNC: <0.2 MG/DL (ref 0–0.3)
BILIRUB SERPL-MCNC: 0.4 MG/DL
BUN SERPL-MCNC: 17.2 MG/DL (ref 6–20)
CALCIUM SERPL-MCNC: 9.8 MG/DL (ref 8.6–10)
CHLORIDE SERPL-SCNC: 104 MMOL/L (ref 98–107)
CREAT SERPL-MCNC: 1.32 MG/DL (ref 0.67–1.17)
DEPRECATED HCO3 PLAS-SCNC: 29 MMOL/L (ref 22–29)
EGFRCR SERPLBLD CKD-EPI 2021: 65 ML/MIN/1.73M2
ERYTHROCYTE [DISTWIDTH] IN BLOOD BY AUTOMATED COUNT: 13.7 % (ref 10–15)
GLUCOSE SERPL-MCNC: 177 MG/DL (ref 70–99)
HCT VFR BLD AUTO: 38.1 % (ref 40–53)
HGB BLD-MCNC: 12.7 G/DL (ref 13.3–17.7)
MAGNESIUM SERPL-MCNC: 2.6 MG/DL (ref 1.7–2.3)
MCH RBC QN AUTO: 31 PG (ref 26.5–33)
MCHC RBC AUTO-ENTMCNC: 33.3 G/DL (ref 31.5–36.5)
MCV RBC AUTO: 93 FL (ref 78–100)
PHOSPHATE SERPL-MCNC: 4.8 MG/DL (ref 2.5–4.5)
PLATELET # BLD AUTO: 150 10E3/UL (ref 150–450)
POTASSIUM SERPL-SCNC: 4.7 MMOL/L (ref 3.4–5.3)
PROT SERPL-MCNC: 6.9 G/DL (ref 6.4–8.3)
RBC # BLD AUTO: 4.1 10E6/UL (ref 4.4–5.9)
SODIUM SERPL-SCNC: 140 MMOL/L (ref 135–145)
TACROLIMUS BLD-MCNC: 12.5 UG/L (ref 5–15)
TME LAST DOSE: NORMAL H
TME LAST DOSE: NORMAL H
WBC # BLD AUTO: 3.9 10E3/UL (ref 4–11)

## 2024-06-11 PROCEDURE — 83735 ASSAY OF MAGNESIUM: CPT

## 2024-06-11 PROCEDURE — 80197 ASSAY OF TACROLIMUS: CPT

## 2024-06-11 PROCEDURE — 36415 COLL VENOUS BLD VENIPUNCTURE: CPT

## 2024-06-11 PROCEDURE — 80053 COMPREHEN METABOLIC PANEL: CPT

## 2024-06-11 PROCEDURE — 82248 BILIRUBIN DIRECT: CPT

## 2024-06-11 PROCEDURE — 85027 COMPLETE CBC AUTOMATED: CPT

## 2024-06-11 PROCEDURE — 84100 ASSAY OF PHOSPHORUS: CPT

## 2024-06-12 ENCOUNTER — MYC REFILL (OUTPATIENT)
Dept: TRANSPLANT | Facility: CLINIC | Age: 54
End: 2024-06-12
Payer: COMMERCIAL

## 2024-06-12 DIAGNOSIS — Z76.82 LIVER TRANSPLANT CANDIDATE: ICD-10-CM

## 2024-06-12 DIAGNOSIS — Z94.4 LIVER REPLACED BY TRANSPLANT (H): Chronic | ICD-10-CM

## 2024-06-12 RX ORDER — TACROLIMUS 5 MG/1
10 CAPSULE ORAL EVERY 12 HOURS
Qty: 360 CAPSULE | Refills: 3 | Status: ON HOLD | OUTPATIENT
Start: 2024-06-12 | End: 2024-06-27

## 2024-06-12 NOTE — TELEPHONE ENCOUNTER
ISSUE:   Tacrolimus IR level 12.5 on 6/11, goal 6-10, Creatinine up. dose 11 mg BID.    PLAN:   Call Patient and confirm this was an accurate 12-hour trough.   Verify Tacrolimus IR dose .   Confirm no new medications or or missed doses.   Confirm no new illness / infection / diarrhea.   If accurate trough and accurate dose, decrease Tacrolimus IR dose to 10 mg BID     Repeat labs 2 weeks.  Lore Mckeon RN     OUTCOME:   Spoke with Roiso, they confirm accurate trough level and current dose 11 mg BID.   Patient confirmed dose change to 10 mg BID.  Patient agreed to repeat labs in 2 weeks.   Orders sent to preferred pharmacy for dose change and lab for repeat labs.   Rosio voiced understanding of plan.      Valentina Enamorado LPN     See DC summary

## 2024-06-13 ENCOUNTER — TELEPHONE (OUTPATIENT)
Dept: TRANSPLANT | Facility: CLINIC | Age: 54
End: 2024-06-13
Payer: COMMERCIAL

## 2024-06-13 DIAGNOSIS — Z94.4 LIVER TRANSPLANTED (H): ICD-10-CM

## 2024-06-13 DIAGNOSIS — Z94.4 LIVER REPLACED BY TRANSPLANT (H): Chronic | ICD-10-CM

## 2024-06-13 RX ORDER — VALGANCICLOVIR 450 MG/1
450 TABLET, FILM COATED ORAL EVERY EVENING
Qty: 90 TABLET | Refills: 3 | Status: SHIPPED | OUTPATIENT
Start: 2024-06-13 | End: 2024-06-13

## 2024-06-13 RX ORDER — URSODIOL 300 MG/1
300 CAPSULE ORAL 2 TIMES DAILY
Qty: 180 CAPSULE | Refills: 3 | Status: SHIPPED | OUTPATIENT
Start: 2024-06-13 | End: 2024-10-03

## 2024-06-13 RX ORDER — PREDNISONE 5 MG/1
2.5 TABLET ORAL DAILY
COMMUNITY
Start: 2024-06-13 | End: 2024-06-20

## 2024-06-13 RX ORDER — ASPIRIN 325 MG
325 TABLET ORAL DAILY
Qty: 90 TABLET | Refills: 3 | Status: SHIPPED | OUTPATIENT
Start: 2024-06-13 | End: 2024-06-13

## 2024-06-13 RX ORDER — SULFAMETHOXAZOLE AND TRIMETHOPRIM 400; 80 MG/1; MG/1
1 TABLET ORAL EVERY EVENING
Qty: 90 TABLET | Refills: 3 | Status: ON HOLD | OUTPATIENT
Start: 2024-06-13 | End: 2024-06-27

## 2024-06-13 RX ORDER — ASPIRIN 325 MG
325 TABLET ORAL DAILY
Qty: 60 TABLET | Refills: 1 | Status: SHIPPED | OUTPATIENT
Start: 2024-06-13 | End: 2024-07-02

## 2024-06-13 NOTE — TELEPHONE ENCOUNTER
Call to Adina for general follow-up.  She still asks that I speak w/ her vs Mary.     Appetite: good  Diarrhea: none  Nausea:none  Activity: out and about regularly  Pain management: shoulders  Anxiety / depression: none  Physician follow-up: Flavio w/ abdominal film to look for biliary stent 7/2.  Lab frequency: every other week.   Med changes: decrease pred to 2.5  daily for a week, then stop.  I labs good early July will wean mmf.  Stop valcyte now. Will decrease omeprazole to every.  Continue ASA for 6 mos per discontinue orders ([facility] prophy).  Stopped mirtazipine on his own, was taking as appetite stimulant.

## 2024-06-18 ENCOUNTER — VIRTUAL VISIT (OUTPATIENT)
Dept: EDUCATION SERVICES | Facility: CLINIC | Age: 54
End: 2024-06-18
Payer: COMMERCIAL

## 2024-06-18 DIAGNOSIS — Z94.4 LIVER REPLACED BY TRANSPLANT (H): Chronic | ICD-10-CM

## 2024-06-18 DIAGNOSIS — Z79.4 TYPE 2 DIABETES MELLITUS WITHOUT COMPLICATION, WITH LONG-TERM CURRENT USE OF INSULIN (H): Primary | Chronic | ICD-10-CM

## 2024-06-18 DIAGNOSIS — E11.9 TYPE 2 DIABETES MELLITUS WITHOUT COMPLICATION, WITH LONG-TERM CURRENT USE OF INSULIN (H): Primary | Chronic | ICD-10-CM

## 2024-06-18 PROCEDURE — 99207 PR DIABETES SELF MANAGEMENT: CPT | Mod: 95 | Performed by: NUTRITIONIST

## 2024-06-18 RX ORDER — BOLUS INSULIN PUMP, 200 UNIT 2 UNIT
1 EACH MISCELLANEOUS
Qty: 10 EACH | Refills: 5 | Status: SHIPPED | OUTPATIENT
Start: 2024-06-18

## 2024-06-18 RX ORDER — INSULIN DEGLUDEC 100 U/ML
26 INJECTION, SOLUTION SUBCUTANEOUS EVERY MORNING
Qty: 15 ML | Refills: 5 | Status: SHIPPED | OUTPATIENT
Start: 2024-06-18 | End: 2024-09-04

## 2024-06-18 RX ORDER — DIABETIC SUPPLIES,MISCELL
1 MISCELLANEOUS MISCELLANEOUS ONCE
Qty: 1 EACH | Refills: 0 | Status: SHIPPED | OUTPATIENT
Start: 2024-06-18 | End: 2024-06-18

## 2024-06-18 NOTE — NURSING NOTE
Is the patient currently in the state of [place]? YES    Visit mode:VIDEO    If the visit is dropped, the patient can be reconnected by: VIDEO VISIT: Text to cell phone:   Telephone Information:   Mobile [phone]   Mobile [phone]       Will anyone else be joining the visit? NO  (If patient encounters technical issues they should call [phone]    How would you like to obtain your AVS? MyChart    Are changes needed to the allergy or medication list? Pt stated no med changes    Are refills needed on medications prescribed by this physician? NO    Reason for visit: JOELLE CHRISTY

## 2024-06-18 NOTE — PROGRESS NOTES
Virtual Visit Details    Type of service:  Video Visit     Originating Location (pt. Location): Home    Distant Location (provider location):  On-site  Platform used for Video Visit: Yenifer    Diabetes Self-Management Education & Support    Maryclement Smith Jessica presents today for education related to Type 2 diabetes    Patient is being treated with:  insulin  He is accompanied by spouse    Year of diagnosis: 2023  Referring provider:  Lai  Patient is followed by Suzanne THOMPSON, now followed by Geovanna THOMPSON  Living Situation: with spouse   Employment: n/a    PATIENT CONCERNS RELATED TO DIABETES SELF MANAGEMENT:       ASSESSMENT:    Taking Medication:     Current Diabetes Management per Patient:  Taking diabetes medications? yes:     Diabetes Medication(s)       Diabetic Other       Glucagon (GVOKE HYPOPEN) 1 MG/0.2ML pen Inject the contents of 1 device under the skin into lower abdomen, outer thigh, or outer upper arm as needed for hypoglycemia. If no response after 15 minutes, additional 1 mg dose from a new device may be injected while waiting for emergency assistance.     Patient not taking: Reported on 5/15/2024       Insulin       insulin aspart (NOVOLOG PEN) 100 UNIT/ML pen Inject 1-10 Units Subcutaneous 3 times daily (before meals) Humalog 4 units with meals plus correction scale 1:50 >140 TID AC and 1:50 >200 HS Pre-meal Humalog correction scale: Do Not give Correction Insulin if Pre-Meal BG less than 140. For Pre-Meal  - 189 give 1 unit. For Pre-Meal  - 239 give 2 units. For Pre-Meal  - 289 give 3 units. For Pre-Meal  - 339 give 4 units. For Pre-Meal - 399 give 5 units. For Pre-Meal -449 give 6 units For Pre-Meal BG greater than or equal to 450 give 7 units. To be given with prandial insulin, and based on pre-meal blood glucose. Bedtime Humalog correction scale: Do Not give Bedtime Correction Insulin if BG less than 200. For  - 249 give 1 units. For  -  299 give 2 units. For  - 349 give 3 units. For  -399 give 4 units. For BG greater than or equal to 400 give 5 units. Notify provider if glucose greater than or equal to 350 mg/dL after administration of correction dose.     Insulin Glargine-yfgn (SEMGLEE, YFGN,) 100 UNIT/ML SOLN Inject 5 Units Subcutaneous daily     Patient taking differently: Inject 5 Units Subcutaneous daily 3-5 units based off morning BG       Sodium-Glucose Co-Transporter 2 (SGLT2) Inhibitors       empagliflozin (JARDIANCE) 25 MG TABS tablet Take 1 tablet (25 mg) by mouth daily            Monitoring        6/4 Visit:                  Jardiance 25 mg  Rapid acting insulin to 4 units breakfast, 3 with lunch and 3 with dinner  Ok to increase semglee to 26 units  Correction 1/50/140    Patient's most recent   Lab Results   Component Value Date    A1C 8.1 10/31/2023        Healthy Eating:   encouraged consistent carb diet    Problem Solving:      Patient is at risk of hypoglycemia?: YES  Hospitalizations for hyper or hypoglycemia: No      EDUCATION and INSTRUCTION PROVIDED AT THIS VISIT:      Mary has been on the Jardiance for almost a couple weeks now and it is going well. Glucose has improved but is not at goal.  Mary would like to try the [employer] Simplicity patch. This should help him get his insulin when he eats and to not miss doses - such as when Adina is unavailable to give the injections.   Recommend starting with 3 clicks at each meal.Order placed. They will get training from a [employer] healthcare professional.    Patient-stated goal written and given to Mary Smith Jessica.  Verbalized and demonstrated understanding of instructions.       FOLLOW-UP:    Two weeks    Joined the call at 6/18/2024, 11:35:17 am.  Left the call at 6/18/2024, 12:01:45 pm.  You were on the call for 26 minutes 27 seconds     Any diabetes medication dose changes were made via the CDE Protocol and Collaborative Practice Agreement with [facility] and [facility]  [facility].  A copy of this encounter was provided to patient's referring provider.

## 2024-06-19 NOTE — TELEPHONE ENCOUNTER
[facility] preferred   Pt notified   Provider notified.   Daniella Saenz RN on 6/19/2024 at 3:17 PM

## 2024-06-20 DIAGNOSIS — E11.69 DIABETES MELLITUS ASSOCIATED WITH PANCREATIC DISEASE (H): Primary | ICD-10-CM

## 2024-06-20 DIAGNOSIS — K86.9 DIABETES MELLITUS ASSOCIATED WITH PANCREATIC DISEASE (H): Primary | ICD-10-CM

## 2024-06-20 RX ORDER — INSULIN LISPRO-AABC 100 [IU]/ML
6 INJECTION, SOLUTION SUBCUTANEOUS
Qty: 30 ML | Refills: 2 | Status: SHIPPED | OUTPATIENT
Start: 2024-06-20

## 2024-06-21 ENCOUNTER — TELEPHONE (OUTPATIENT)
Dept: TRANSPLANT | Facility: CLINIC | Age: 54
End: 2024-06-21

## 2024-06-21 ENCOUNTER — MYC MEDICAL ADVICE (OUTPATIENT)
Dept: FAMILY MEDICINE | Facility: CLINIC | Age: 54
End: 2024-06-21

## 2024-06-21 ENCOUNTER — OFFICE VISIT (OUTPATIENT)
Dept: FAMILY MEDICINE | Facility: CLINIC | Age: 54
End: 2024-06-21
Payer: COMMERCIAL

## 2024-06-21 VITALS
HEART RATE: 91 BPM | WEIGHT: 185.6 LBS | BODY MASS INDEX: 27.49 KG/M2 | TEMPERATURE: 97.5 F | SYSTOLIC BLOOD PRESSURE: 109 MMHG | OXYGEN SATURATION: 100 % | HEIGHT: 69 IN | DIASTOLIC BLOOD PRESSURE: 71 MMHG | RESPIRATION RATE: 18 BRPM

## 2024-06-21 DIAGNOSIS — K86.9 DIABETES MELLITUS ASSOCIATED WITH PANCREATIC DISEASE (H): ICD-10-CM

## 2024-06-21 DIAGNOSIS — N17.9 AKI (ACUTE KIDNEY INJURY) (H): Primary | ICD-10-CM

## 2024-06-21 DIAGNOSIS — E11.9 TYPE 2 DIABETES MELLITUS WITHOUT COMPLICATION, WITH LONG-TERM CURRENT USE OF INSULIN (H): ICD-10-CM

## 2024-06-21 DIAGNOSIS — R53.83 FATIGUE, UNSPECIFIED TYPE: ICD-10-CM

## 2024-06-21 DIAGNOSIS — Z94.4 LIVER REPLACED BY TRANSPLANT (H): Chronic | ICD-10-CM

## 2024-06-21 DIAGNOSIS — F10.21 ALCOHOL DEPENDENCE, IN REMISSION (H): ICD-10-CM

## 2024-06-21 DIAGNOSIS — R41.0 MENTAL CONFUSION: Primary | ICD-10-CM

## 2024-06-21 DIAGNOSIS — Z79.4 TYPE 2 DIABETES MELLITUS WITHOUT COMPLICATION, WITH LONG-TERM CURRENT USE OF INSULIN (H): ICD-10-CM

## 2024-06-21 DIAGNOSIS — D84.9 IMMUNOSUPPRESSED STATUS (H): Chronic | ICD-10-CM

## 2024-06-21 DIAGNOSIS — E11.69 DIABETES MELLITUS ASSOCIATED WITH PANCREATIC DISEASE (H): ICD-10-CM

## 2024-06-21 DIAGNOSIS — E43 SEVERE MALNUTRITION (H): ICD-10-CM

## 2024-06-21 DIAGNOSIS — G47.00 PERSISTENT INSOMNIA: Chronic | ICD-10-CM

## 2024-06-21 PROBLEM — E87.70 HYPERVOLEMIA: Status: RESOLVED | Noted: 2024-03-21 | Resolved: 2024-06-21

## 2024-06-21 PROBLEM — E66.01 MORBID OBESITY (H): Chronic | Status: RESOLVED | Noted: 2022-09-16 | Resolved: 2024-06-21

## 2024-06-21 PROBLEM — D62 ANEMIA DUE TO BLOOD LOSS, ACUTE: Status: RESOLVED | Noted: 2024-03-21 | Resolved: 2024-06-21

## 2024-06-21 LAB
ALBUMIN SERPL BCG-MCNC: 4.4 G/DL (ref 3.5–5.2)
ALBUMIN UR-MCNC: NEGATIVE MG/DL
ALP SERPL-CCNC: 91 U/L (ref 40–150)
ALT SERPL W P-5'-P-CCNC: 27 U/L (ref 0–70)
ANION GAP SERPL CALCULATED.3IONS-SCNC: 7 MMOL/L (ref 7–15)
APPEARANCE UR: CLEAR
AST SERPL W P-5'-P-CCNC: 27 U/L (ref 0–45)
BACTERIA #/AREA URNS HPF: ABNORMAL /HPF
BASOPHILS # BLD AUTO: 0.1 10E3/UL (ref 0–0.2)
BASOPHILS NFR BLD AUTO: 1 %
BILIRUB DIRECT SERPL-MCNC: <0.2 MG/DL (ref 0–0.3)
BILIRUB SERPL-MCNC: 0.5 MG/DL
BILIRUB UR QL STRIP: ABNORMAL
BUN SERPL-MCNC: 46.2 MG/DL (ref 6–20)
CALCIUM SERPL-MCNC: 10.5 MG/DL (ref 8.6–10)
CHLORIDE SERPL-SCNC: 103 MMOL/L (ref 98–107)
COLOR UR AUTO: YELLOW
CREAT SERPL-MCNC: 2.15 MG/DL (ref 0.67–1.17)
CYSTATIN C (ROCHE): 2.8 MG/L (ref 0.6–1)
DEPRECATED HCO3 PLAS-SCNC: 21 MMOL/L (ref 22–29)
EGFRCR SERPLBLD CKD-EPI 2021: 36 ML/MIN/1.73M2
EOSINOPHIL # BLD AUTO: 0.4 10E3/UL (ref 0–0.7)
EOSINOPHIL NFR BLD AUTO: 6 %
ERYTHROCYTE [DISTWIDTH] IN BLOOD BY AUTOMATED COUNT: 13 % (ref 10–15)
GFR SERPL CREATININE-BSD FRML MDRD: 20 ML/MIN/1.73M2
GLUCOSE SERPL-MCNC: 261 MG/DL (ref 70–99)
GLUCOSE UR STRIP-MCNC: 500 MG/DL
HBA1C MFR BLD: 7.3 % (ref 0–5.6)
HCT VFR BLD AUTO: 42 % (ref 40–53)
HGB BLD-MCNC: 13.9 G/DL (ref 13.3–17.7)
HGB UR QL STRIP: NEGATIVE
HYALINE CASTS #/AREA URNS LPF: ABNORMAL /LPF
IMM GRANULOCYTES # BLD: 0.2 10E3/UL
IMM GRANULOCYTES NFR BLD: 3 %
KETONES UR STRIP-MCNC: NEGATIVE MG/DL
LEUKOCYTE ESTERASE UR QL STRIP: NEGATIVE
LYMPHOCYTES # BLD AUTO: 0.9 10E3/UL (ref 0.8–5.3)
LYMPHOCYTES NFR BLD AUTO: 14 %
MAGNESIUM SERPL-MCNC: 1.6 MG/DL (ref 1.7–2.3)
MCH RBC QN AUTO: 30.3 PG (ref 26.5–33)
MCHC RBC AUTO-ENTMCNC: 33.1 G/DL (ref 31.5–36.5)
MCV RBC AUTO: 92 FL (ref 78–100)
MONOCYTES # BLD AUTO: 0.5 10E3/UL (ref 0–1.3)
MONOCYTES NFR BLD AUTO: 8 %
NEUTROPHILS # BLD AUTO: 4.7 10E3/UL (ref 1.6–8.3)
NEUTROPHILS NFR BLD AUTO: 68 %
NITRATE UR QL: NEGATIVE
PH UR STRIP: 5 [PH] (ref 5–7)
PHOSPHATE SERPL-MCNC: 4.8 MG/DL (ref 2.5–4.5)
PLATELET # BLD AUTO: 170 10E3/UL (ref 150–450)
POTASSIUM SERPL-SCNC: 7.3 MMOL/L (ref 3.4–5.3)
PROT SERPL-MCNC: 7.5 G/DL (ref 6.4–8.3)
RBC # BLD AUTO: 4.59 10E6/UL (ref 4.4–5.9)
RBC #/AREA URNS AUTO: ABNORMAL /HPF
SODIUM SERPL-SCNC: 131 MMOL/L (ref 135–145)
SP GR UR STRIP: 1.02 (ref 1–1.03)
SQUAMOUS #/AREA URNS AUTO: ABNORMAL /LPF
UROBILINOGEN UR STRIP-ACNC: 0.2 E.U./DL
WBC # BLD AUTO: 6.8 10E3/UL (ref 4–11)
WBC #/AREA URNS AUTO: ABNORMAL /HPF

## 2024-06-21 PROCEDURE — 82248 BILIRUBIN DIRECT: CPT | Performed by: INTERNAL MEDICINE

## 2024-06-21 PROCEDURE — 80053 COMPREHEN METABOLIC PANEL: CPT | Performed by: INTERNAL MEDICINE

## 2024-06-21 PROCEDURE — 85025 COMPLETE CBC W/AUTO DIFF WBC: CPT | Performed by: INTERNAL MEDICINE

## 2024-06-21 PROCEDURE — 83735 ASSAY OF MAGNESIUM: CPT | Performed by: INTERNAL MEDICINE

## 2024-06-21 PROCEDURE — 83036 HEMOGLOBIN GLYCOSYLATED A1C: CPT | Performed by: INTERNAL MEDICINE

## 2024-06-21 PROCEDURE — 81001 URINALYSIS AUTO W/SCOPE: CPT | Performed by: INTERNAL MEDICINE

## 2024-06-21 PROCEDURE — 36415 COLL VENOUS BLD VENIPUNCTURE: CPT | Performed by: INTERNAL MEDICINE

## 2024-06-21 PROCEDURE — 82610 CYSTATIN C: CPT | Performed by: INTERNAL MEDICINE

## 2024-06-21 PROCEDURE — G2211 COMPLEX E/M VISIT ADD ON: HCPCS | Performed by: INTERNAL MEDICINE

## 2024-06-21 PROCEDURE — 99214 OFFICE O/P EST MOD 30 MIN: CPT | Performed by: INTERNAL MEDICINE

## 2024-06-21 PROCEDURE — 84100 ASSAY OF PHOSPHORUS: CPT | Performed by: INTERNAL MEDICINE

## 2024-06-21 RX ORDER — QUETIAPINE FUMARATE 25 MG/1
25 TABLET, FILM COATED ORAL AT BEDTIME
Qty: 90 TABLET | Refills: 1 | Status: ON HOLD | OUTPATIENT
Start: 2024-06-21 | End: 2024-06-27

## 2024-06-21 NOTE — PROGRESS NOTES
"  Assessment & Plan     1.  Mental confusion or fogginess noticed the past 3 to 4 days.  Patient is s/p liver transplant and not clear as to etiology.  The center and investigate by performing his usual transplant lab earlier there is check CBC with differential, BMP, LFT, phosphorus, magnesium level as well as urine analysis.  Also A1c.  I will get back with results.  I advised his wife to contact transplant team as well.  This appears to be a definite change.  2.  Persistent insomnia which has gotten worst.  Patient not responding to trazodone 50 mg and for the past week his wife added Remeron 15 mg along with melatonin which has not helped.  Requesting switching to Seroquel.  Previously was on doxepin.  I will discontinue trazodone as well as Remeron and placed on Seroquel 25 mg a day.  Will see how he does with that dose.  3.  Fatigue worst in the past 3 to 4 days.  4.  Liver replaced by transplant on 3/5/2024.  5.  Alcohol dependency in remission.  6.  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  Usually getting 6 units with each meal.  Followed by endocrinology.  Blood sugars according to wife have been good usually below 200.      BMI  Estimated body mass index is 27.81 kg/m  as calculated from the following:    Height as of this encounter: 1.74 m (5' 8.5\").    Weight as of this encounter: 84.2 kg (185 lb 9.6 oz).         Elmo Hernandez is a 53 year old, presenting for the following health issues:  Liver Transplant and Fatigue        6/21/2024     9:01 AM   Additional Questions   Roomed by Neha     History of Present Illness       Reason for visit:  Post liver Tx follow up    He eats 0-1 servings of fruits and vegetables daily.He consumes 1 sweetened beverage(s) daily.He exercises with enough effort to increase his heart rate 9 or less minutes per day.  He exercises with enough effort to increase his heart rate 3 " "or less days per week.   He is taking medications regularly.         Concern - Fatigue   Onset: couple weeks   Description: unable to do the things he was doing two weeks ago like doing yard work   Intensity: severe  Progression of Symptoms:  worsening  Accompanying Signs & Symptoms: none  Previous history of similar problem: none  Precipitating factors:        Worsened by: none  Alleviating factors:        Improved by: none  Therapies tried and outcome: None    The patient comes in accompanied by his wife.  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  He had been recovering slowly and was gradually feeling more energetic and eating better.  Past weekend he cleaned out his garage.  However the past 3 to 4 days he has been more tired and fatigued, more mental fogginess to point of confusion and decreased appetite.  Not sleeping well at all.  Sleep has been really bad last week or more.  He is already on trazodone 50 mg a day and a week ago his wife started left over Remeron 15 mg a day.  Also on melatonin.  Still not sleeping at night.  In my office he dozed off while laying flat.  Wife wonders if it is the lack of sleep that is causing his mental fogginess.  No fever or chills.  Denies dysuria.  No chest pain or shortness of breath.  No rash.    Review of Systems  Constitutional, HEENT, cardiovascular, pulmonary, GI, , musculoskeletal, neuro, skin, endocrine and psych systems are negative, except as otherwise noted.      Objective    /71 (BP Location: Right arm, Patient Position: Sitting, Cuff Size: Adult Regular)   Pulse 91   Temp 97.5  F (36.4  C) (Oral)   Resp 18   Ht 1.74 m (5' 8.5\")   Wt 84.2 kg (185 lb 9.6 oz)   SpO2 100%   BMI 27.81 kg/m    Body mass index is 27.81 kg/m .  Physical Exam   GENERAL: alert and no distress  EYES: Eyes grossly normal to inspection, PERRL and conjunctivae and sclerae normal  NECK: no adenopathy, no asymmetry, masses, or scars  RESP: lungs clear " to auscultation - no rales, rhonchi or wheezes  CV: regular rate and rhythm, normal S1 S2, no S3 or S4, no murmur, click or rub, no peripheral edema  ABDOMEN: soft, nontender, no hepatosplenomegaly, no masses and bowel sounds normal  MS: no gross musculoskeletal defects noted, no edema  NEURO: Normal strength and tone, mentation intact and speech normal            Signed Electronically by: Jimmie Hoyt MD

## 2024-06-21 NOTE — TELEPHONE ENCOUNTER
DATE:  6/21/2024     TIME OF RECEIPT FROM LAB:  4:00    ORDERING PROVIDER: brooks    LAB TEST:  K+    LAB VALUE:  7.3    RESULTS GIVEN WITH READ-BACK TO (PROVIDER):  Isabella Quiñonez    TIME LAB VALUE REPORTED TO PROVIDER:   4:00

## 2024-06-21 NOTE — TELEPHONE ENCOUNTER
Spoke to Rosio and confirmed the information given through [facility]. Asked that Rosio reach out the PCP to discuss the lab results that he had ordered. Our tx labs have not been resulted. Suggested that we wait for labs to result and determine if pt should have all of tx labs done next Tuesday or just the tacro level which was planned. FYI.

## 2024-06-21 NOTE — TELEPHONE ENCOUNTER
Spoke with Modesta.     Pt has not been eating a lot of high potassium foods.   He is struggling with fatigue and brain fog. Denies SOB or chest pains.    Pt encouraged to go to ER. Pt is currently outside of [place], [place]. Reviewed that there is an ER in [facility]. They will bring him to the ER.    Pt did not do tacro level, but will repeat on Tuesday.     Pt's creatinine increased and sodium low.     Message to dr herrera.

## 2024-06-22 ENCOUNTER — HOSPITAL ENCOUNTER (INPATIENT)
Facility: CLINIC | Age: 54
LOS: 5 days | Discharge: HOME | DRG: 640 | End: 2024-06-27
Attending: SURGERY | Admitting: TRANSPLANT SURGERY
Payer: COMMERCIAL

## 2024-06-22 DIAGNOSIS — E87.5 HYPERKALEMIA: Primary | ICD-10-CM

## 2024-06-22 DIAGNOSIS — Z94.4 LIVER REPLACED BY TRANSPLANT (H): Chronic | ICD-10-CM

## 2024-06-22 DIAGNOSIS — E83.42 HYPOMAGNESEMIA: ICD-10-CM

## 2024-06-22 DIAGNOSIS — M25.512 CHRONIC PAIN OF BOTH SHOULDERS: ICD-10-CM

## 2024-06-22 DIAGNOSIS — G93.40 ENCEPHALOPATHY: ICD-10-CM

## 2024-06-22 DIAGNOSIS — Z94.4 S/P LIVER TRANSPLANT (H): ICD-10-CM

## 2024-06-22 DIAGNOSIS — G89.29 CHRONIC PAIN OF BOTH SHOULDERS: ICD-10-CM

## 2024-06-22 DIAGNOSIS — M25.511 CHRONIC PAIN OF BOTH SHOULDERS: ICD-10-CM

## 2024-06-22 DIAGNOSIS — G47.00 PERSISTENT INSOMNIA: Chronic | ICD-10-CM

## 2024-06-22 LAB
ANION GAP SERPL CALCULATED.3IONS-SCNC: 12 MMOL/L (ref 7–15)
BUN SERPL-MCNC: 42.8 MG/DL (ref 6–20)
CALCIUM SERPL-MCNC: 9.7 MG/DL (ref 8.6–10)
CHLORIDE SERPL-SCNC: 108 MMOL/L (ref 98–107)
CREAT SERPL-MCNC: 1.92 MG/DL (ref 0.67–1.17)
DEPRECATED HCO3 PLAS-SCNC: 17 MMOL/L (ref 22–29)
EGFRCR SERPLBLD CKD-EPI 2021: 41 ML/MIN/1.73M2
GLUCOSE BLDC GLUCOMTR-MCNC: 204 MG/DL (ref 70–99)
GLUCOSE SERPL-MCNC: 219 MG/DL (ref 70–99)
POTASSIUM SERPL-SCNC: 6.2 MMOL/L (ref 3.4–5.3)
SODIUM SERPL-SCNC: 137 MMOL/L (ref 135–145)

## 2024-06-22 PROCEDURE — 36415 COLL VENOUS BLD VENIPUNCTURE: CPT | Performed by: STUDENT IN AN ORGANIZED HEALTH CARE EDUCATION/TRAINING PROGRAM

## 2024-06-22 PROCEDURE — 93010 ELECTROCARDIOGRAM REPORT: CPT | Performed by: INTERNAL MEDICINE

## 2024-06-22 PROCEDURE — 250N000009 HC RX 250: Performed by: STUDENT IN AN ORGANIZED HEALTH CARE EDUCATION/TRAINING PROGRAM

## 2024-06-22 PROCEDURE — 250N000011 HC RX IP 250 OP 636: Mod: JZ

## 2024-06-22 PROCEDURE — 80048 BASIC METABOLIC PNL TOTAL CA: CPT | Performed by: STUDENT IN AN ORGANIZED HEALTH CARE EDUCATION/TRAINING PROGRAM

## 2024-06-22 PROCEDURE — 258N000003 HC RX IP 258 OP 636: Mod: JZ | Performed by: STUDENT IN AN ORGANIZED HEALTH CARE EDUCATION/TRAINING PROGRAM

## 2024-06-22 PROCEDURE — 120N000011 HC R&B TRANSPLANT UMMC

## 2024-06-22 PROCEDURE — 99221 1ST HOSP IP/OBS SF/LOW 40: CPT | Mod: GC | Performed by: TRANSPLANT SURGERY

## 2024-06-22 PROCEDURE — 250N000012 HC RX MED GY IP 250 OP 636 PS 637

## 2024-06-22 PROCEDURE — 93005 ELECTROCARDIOGRAM TRACING: CPT

## 2024-06-22 RX ORDER — DEXTROSE MONOHYDRATE 25 G/50ML
25-50 INJECTION, SOLUTION INTRAVENOUS
Status: DISCONTINUED | OUTPATIENT
Start: 2024-06-22 | End: 2024-06-27 | Stop reason: HOSPADM

## 2024-06-22 RX ORDER — LIDOCAINE 40 MG/G
CREAM TOPICAL
Status: DISCONTINUED | OUTPATIENT
Start: 2024-06-22 | End: 2024-06-27 | Stop reason: HOSPADM

## 2024-06-22 RX ORDER — FUROSEMIDE 10 MG/ML
80 INJECTION INTRAMUSCULAR; INTRAVENOUS ONCE
Status: COMPLETED | OUTPATIENT
Start: 2024-06-22 | End: 2024-06-22

## 2024-06-22 RX ORDER — NICOTINE POLACRILEX 4 MG
15-30 LOZENGE BUCCAL
Status: DISCONTINUED | OUTPATIENT
Start: 2024-06-22 | End: 2024-06-27 | Stop reason: HOSPADM

## 2024-06-22 RX ORDER — CALCIUM CARBONATE 500 MG/1
1000 TABLET, CHEWABLE ORAL 4 TIMES DAILY PRN
Status: DISCONTINUED | OUTPATIENT
Start: 2024-06-22 | End: 2024-06-27 | Stop reason: HOSPADM

## 2024-06-22 RX ORDER — CALCIUM GLUCONATE 20 MG/ML
1 INJECTION, SOLUTION INTRAVENOUS ONCE
Status: COMPLETED | OUTPATIENT
Start: 2024-06-22 | End: 2024-06-22

## 2024-06-22 RX ADMIN — FUROSEMIDE 80 MG: 10 INJECTION, SOLUTION INTRAMUSCULAR; INTRAVENOUS at 22:32

## 2024-06-22 RX ADMIN — SODIUM BICARBONATE: 84 INJECTION, SOLUTION INTRAVENOUS at 23:15

## 2024-06-22 RX ADMIN — INSULIN ASPART 2 UNITS: 100 INJECTION, SOLUTION INTRAVENOUS; SUBCUTANEOUS at 23:06

## 2024-06-22 RX ADMIN — CALCIUM GLUCONATE 1 G: 20 INJECTION, SOLUTION INTRAVENOUS at 22:58

## 2024-06-22 ASSESSMENT — ACTIVITIES OF DAILY LIVING (ADL)
ADLS_ACUITY_SCORE: 41
ADLS_ACUITY_SCORE: 36
ADLS_ACUITY_SCORE: 41

## 2024-06-23 LAB
ALBUMIN SERPL BCG-MCNC: 3.9 G/DL (ref 3.5–5.2)
ALP SERPL-CCNC: 82 U/L (ref 40–150)
ALT SERPL W P-5'-P-CCNC: 19 U/L (ref 0–70)
ANION GAP SERPL CALCULATED.3IONS-SCNC: 12 MMOL/L (ref 7–15)
AST SERPL W P-5'-P-CCNC: 26 U/L (ref 0–45)
BILIRUB DIRECT SERPL-MCNC: <0.2 MG/DL (ref 0–0.3)
BILIRUB SERPL-MCNC: 0.4 MG/DL
BUN SERPL-MCNC: 41.7 MG/DL (ref 6–20)
CALCIUM SERPL-MCNC: 9.8 MG/DL (ref 8.6–10)
CHLORIDE SERPL-SCNC: 104 MMOL/L (ref 98–107)
CREAT SERPL-MCNC: 1.75 MG/DL (ref 0.67–1.17)
CYSTATIN C (ROCHE): 2.2 MG/L (ref 0.6–1)
DEPRECATED HCO3 PLAS-SCNC: 22 MMOL/L (ref 22–29)
EGFRCR SERPLBLD CKD-EPI 2021: 46 ML/MIN/1.73M2
GFR SERPL CREATININE-BSD FRML MDRD: 28 ML/MIN/1.73M2
GLUCOSE BLDC GLUCOMTR-MCNC: 161 MG/DL (ref 70–99)
GLUCOSE BLDC GLUCOMTR-MCNC: 166 MG/DL (ref 70–99)
GLUCOSE BLDC GLUCOMTR-MCNC: 168 MG/DL (ref 70–99)
GLUCOSE BLDC GLUCOMTR-MCNC: 177 MG/DL (ref 70–99)
GLUCOSE BLDC GLUCOMTR-MCNC: 183 MG/DL (ref 70–99)
GLUCOSE BLDC GLUCOMTR-MCNC: 190 MG/DL (ref 70–99)
GLUCOSE BLDC GLUCOMTR-MCNC: 225 MG/DL (ref 70–99)
GLUCOSE SERPL-MCNC: 169 MG/DL (ref 70–99)
HOLD SPECIMEN: NORMAL
POTASSIUM SERPL-SCNC: 5 MMOL/L (ref 3.4–5.3)
POTASSIUM SERPL-SCNC: 5 MMOL/L (ref 3.4–5.3)
POTASSIUM SERPL-SCNC: 5.5 MMOL/L (ref 3.4–5.3)
POTASSIUM SERPL-SCNC: 6.2 MMOL/L (ref 3.4–5.3)
PROT SERPL-MCNC: 6.4 G/DL (ref 6.4–8.3)
SODIUM SERPL-SCNC: 138 MMOL/L (ref 135–145)
TACROLIMUS BLD-MCNC: 6.3 UG/L (ref 5–15)
TME LAST DOSE: NORMAL H
TME LAST DOSE: NORMAL H

## 2024-06-23 PROCEDURE — 82610 CYSTATIN C: CPT | Performed by: NURSE PRACTITIONER

## 2024-06-23 PROCEDURE — 82248 BILIRUBIN DIRECT: CPT | Performed by: NURSE PRACTITIONER

## 2024-06-23 PROCEDURE — 250N000013 HC RX MED GY IP 250 OP 250 PS 637

## 2024-06-23 PROCEDURE — 258N000003 HC RX IP 258 OP 636: Mod: JZ

## 2024-06-23 PROCEDURE — 36415 COLL VENOUS BLD VENIPUNCTURE: CPT | Performed by: NURSE PRACTITIONER

## 2024-06-23 PROCEDURE — 80197 ASSAY OF TACROLIMUS: CPT | Performed by: NURSE PRACTITIONER

## 2024-06-23 PROCEDURE — 99223 1ST HOSP IP/OBS HIGH 75: CPT | Mod: FS | Performed by: NURSE PRACTITIONER

## 2024-06-23 PROCEDURE — 258N000001 HC RX 258

## 2024-06-23 PROCEDURE — 250N000012 HC RX MED GY IP 250 OP 636 PS 637

## 2024-06-23 PROCEDURE — 250N000012 HC RX MED GY IP 250 OP 636 PS 637: Performed by: NURSE PRACTITIONER

## 2024-06-23 PROCEDURE — 250N000013 HC RX MED GY IP 250 OP 250 PS 637: Performed by: NURSE PRACTITIONER

## 2024-06-23 PROCEDURE — 80048 BASIC METABOLIC PNL TOTAL CA: CPT

## 2024-06-23 PROCEDURE — 36415 COLL VENOUS BLD VENIPUNCTURE: CPT

## 2024-06-23 PROCEDURE — 84132 ASSAY OF SERUM POTASSIUM: CPT

## 2024-06-23 PROCEDURE — 120N000011 HC R&B TRANSPLANT UMMC

## 2024-06-23 PROCEDURE — 87040 BLOOD CULTURE FOR BACTERIA: CPT | Performed by: NURSE PRACTITIONER

## 2024-06-23 PROCEDURE — 99232 SBSQ HOSP IP/OBS MODERATE 35: CPT | Mod: FS | Performed by: NURSE PRACTITIONER

## 2024-06-23 RX ORDER — FLUDROCORTISONE ACETATE 0.1 MG/1
0.1 TABLET ORAL
Status: DISCONTINUED | OUTPATIENT
Start: 2024-06-24 | End: 2024-06-27 | Stop reason: HOSPADM

## 2024-06-23 RX ORDER — TACROLIMUS 1 MG/1
2 CAPSULE ORAL
Status: DISCONTINUED | OUTPATIENT
Start: 2024-06-23 | End: 2024-06-24

## 2024-06-23 RX ORDER — FLUDROCORTISONE ACETATE 0.1 MG/1
0.1 TABLET ORAL ONCE
Status: COMPLETED | OUTPATIENT
Start: 2024-06-23 | End: 2024-06-23

## 2024-06-23 RX ORDER — DAPSONE 25 MG/1
50 TABLET ORAL DAILY
Status: DISCONTINUED | OUTPATIENT
Start: 2024-06-24 | End: 2024-06-27 | Stop reason: HOSPADM

## 2024-06-23 RX ORDER — URSODIOL 300 MG/1
300 CAPSULE ORAL 2 TIMES DAILY
Status: DISCONTINUED | OUTPATIENT
Start: 2024-06-23 | End: 2024-06-27 | Stop reason: HOSPADM

## 2024-06-23 RX ORDER — DEXTROSE MONOHYDRATE 25 G/50ML
25 INJECTION, SOLUTION INTRAVENOUS ONCE
Status: COMPLETED | OUTPATIENT
Start: 2024-06-23 | End: 2024-06-23

## 2024-06-23 RX ADMIN — INSULIN ASPART 1 UNITS: 100 INJECTION, SOLUTION INTRAVENOUS; SUBCUTANEOUS at 07:00

## 2024-06-23 RX ADMIN — SODIUM ZIRCONIUM CYCLOSILICATE 10 G: 10 POWDER, FOR SUSPENSION ORAL at 00:15

## 2024-06-23 RX ADMIN — SODIUM ZIRCONIUM CYCLOSILICATE 10 G: 10 POWDER, FOR SUSPENSION ORAL at 08:39

## 2024-06-23 RX ADMIN — DEXTROSE MONOHYDRATE 300 ML: 100 INJECTION, SOLUTION INTRAVENOUS at 02:18

## 2024-06-23 RX ADMIN — SODIUM CHLORIDE 10 UNITS: 9 INJECTION, SOLUTION INTRAVENOUS at 02:20

## 2024-06-23 RX ADMIN — FLUDROCORTISONE ACETATE 0.1 MG: 0.1 TABLET ORAL at 12:12

## 2024-06-23 RX ADMIN — SODIUM ZIRCONIUM CYCLOSILICATE 10 G: 10 POWDER, FOR SUSPENSION ORAL at 14:39

## 2024-06-23 RX ADMIN — INSULIN ASPART 1 UNITS: 100 INJECTION, SOLUTION INTRAVENOUS; SUBCUTANEOUS at 03:29

## 2024-06-23 RX ADMIN — SODIUM ZIRCONIUM CYCLOSILICATE 10 G: 10 POWDER, FOR SUSPENSION ORAL at 22:30

## 2024-06-23 RX ADMIN — DEXTROSE MONOHYDRATE 25 G: 25 INJECTION, SOLUTION INTRAVENOUS at 02:23

## 2024-06-23 RX ADMIN — MYCOPHENOLIC ACID 540 MG: 180 TABLET, DELAYED RELEASE ORAL at 20:01

## 2024-06-23 RX ADMIN — URSODIOL 300 MG: 300 CAPSULE ORAL at 12:11

## 2024-06-23 RX ADMIN — TACROLIMUS 2 MG: 1 CAPSULE ORAL at 18:20

## 2024-06-23 RX ADMIN — MYCOPHENOLIC ACID 540 MG: 180 TABLET, DELAYED RELEASE ORAL at 12:11

## 2024-06-23 RX ADMIN — URSODIOL 300 MG: 300 CAPSULE ORAL at 20:01

## 2024-06-23 ASSESSMENT — ACTIVITIES OF DAILY LIVING (ADL)
ADLS_ACUITY_SCORE: 42
ADLS_ACUITY_SCORE: 41
ADLS_ACUITY_SCORE: 41
ADLS_ACUITY_SCORE: 42
ADLS_ACUITY_SCORE: 41
ADLS_ACUITY_SCORE: 42
ADLS_ACUITY_SCORE: 41
ADLS_ACUITY_SCORE: 42
ADLS_ACUITY_SCORE: 42
ADLS_ACUITY_SCORE: 41
ADLS_ACUITY_SCORE: 42
ADLS_ACUITY_SCORE: 41
ADLS_ACUITY_SCORE: 42
ADLS_ACUITY_SCORE: 42
ADLS_ACUITY_SCORE: 41
ADLS_ACUITY_SCORE: 41
ADLS_ACUITY_SCORE: 42
ADLS_ACUITY_SCORE: 41
ADLS_ACUITY_SCORE: 42
ADLS_ACUITY_SCORE: 42
ADLS_ACUITY_SCORE: 41

## 2024-06-23 NOTE — PROGRESS NOTES
Immunosuppression Management Note:    Mary Lopez is a 53 year old male who is seen today  for immunosuppression management     I, Ryder Cortez MD, I have examined the patient with our JOSELYN/Fellow as part of a shared visit.   I participated in the rounds,  discussed and agree with the note and findings and  reviewed today's vital signs, medications, labs and imaging as noted in this note.  I  reviewed the  immunosuppression medications.  I personally provided a substantive portion of the care of this patient. I personally performed the immunosuppressive management of this patient, reviewed the overall  immunosuppression including drug levels, allograft function and provided the recommendations to adjust the dose to provide optimal levels to prevent rejection of the allograft and prevent toxicity to the organs. This was complex care due to the fresh allograft.   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 He was visiting [place] for a wedding where he was found to be confused. Presented to OSH and was found to have hyperkalemia (K 7.3). Transferred to [facility] for further management.    Potassium  treated overnight with shifting, Lokelma, and Lasix with improvement.      Plan:   - Transplant nephrology consult d/t ANGEL, hyperkalemia   - Florinef x1, then MWF   - MRI Brain d/t confusion, high tac levels   - Tac level 6.3 (Goal level 5-8), restart tacrolimus 2 mg BID   - 2 gram potassium diet   - Hold Bactrim, G6PD WNL, consider dapsone   - Infectious workup ordered d/t confusion   - Remove Moore catheter   - Restart PTA medications      JOSELYN/Fellow/Resident Provider: Fabi Aguirre NP #[id number]    Faculty: Ryder Cortez M.D.    __________________________________________________________________  Transplant History:    3/5/2024 (Liver), Postoperative day: 110     Interval History: History is obtained from the patient, spouse  Overnight events: Received Lokelma, Lasix, IVF, and was shifted overnight d/t hyperkalemia. K 5 with morning labs. Patient remains confused with +tremors, muscle spasms, twitching.     ROS:   A 10-point review of systems was negative except as noted above.    Curent Meds:  Current Facility-Administered Medications   Medication Dose Route Frequency Provider Last Rate Last Admin    [START ON 6/24/2024] fludrocortisone (FLORINEF) tablet 0.1 mg  0.1 mg Oral Once per day on Monday Wednesday Friday Fabi Aguirre NP        insulin aspart (NovoLOG) injection (RAPID ACTING)  1-7 Units Subcutaneous TID AC Fabi Aguirre NP   1 Units at 06/23/24 1111    insulin aspart (NovoLOG) injection (RAPID ACTING)  1-5 Units Subcutaneous At Bedtime Fabi Aguirre NP        insulin aspart (NovoLOG) injection (RAPID ACTING)   Subcutaneous TID w/meals Fabi Aguirre NP   2 Units at 06/23/24 1215    [START ON 6/24/2024] insulin degludec (TRESIBA) 100 UNIT/ML injection 13 Units  13 Units Subcutaneous QAM Fabi Aguirre NP        mycophenolic acid (GENERIC EQUIVALENT) EC tablet 540 mg  540 mg Oral BID Fabi Aguirre NP   540 mg at 06/23/24 1211    sodium chloride (PF) 0.9%  PF flush 3 mL  3 mL Intracatheter Q8H Marlee Chinchilla MD   3 mL at 06/23/24 1111    sodium zirconium cyclosilicate (LOKELMA) packet 10 g  10 g Oral TID Marlee Chinchilla MD   10 g at 06/23/24 1439    Followed by    [START ON 6/25/2024] sodium zirconium cyclosilicate (LOKELMA) packet 10 g  10 g Oral Daily Marlee Chinchilla MD        ursodiol (ACTIGALL) capsule 300 mg  300 mg Oral BID Fabi Aguirre NP   300 mg at 06/23/24 1211       Physical Exam:     Admit      Current Vitals:   /76 (BP Location: Right arm)   Pulse 82   Temp 97.5  F (36.4  C) (Oral)   Resp 16   SpO2 98%     Vital sign ranges:    Temp:  [97.5  F (36.4  C)-98.4  F (36.9  C)] 97.5  F (36.4  C)  Pulse:  [72-88] 82  Resp:  [16-17] 16  BP: (105-113)/(64-79) 105/76  SpO2:  [98 %-100 %] 98 %    General Appearance: in no apparent distress.   Skin: normal, warm, dry, No rashes, induration, or jaundice  Heart: RRR  Lungs: unlabored on RA  Abdomen: abdomen soft. Incision well healed.   : Moore with clear yellow UOP  Extremities: edema: none noted   Neurologic: disoriented; confused. Tremor present bilateral.     Frailty Scores          12/19/2023   Frailty Scores   Final Score Frail   Final Score Number 4      Details                   Data:   CMP  Recent Labs   Lab 06/23/24  1107 06/23/24  0659 06/23/24  0544 06/23/24  0222 06/23/24  0213 06/22/24  2305 06/22/24  2109 06/21/24  1013   NA  --   --  138  --   --   --  137 131*   POTASSIUM  --   --  5.0  5.0  --  5.5*   < > 6.2* 7.3*   CHLORIDE  --   --  104  --   --   --  108* 103   CO2  --   --  22  --   --   --  17* 21*   * 168* 169*   < >  --    < > 219* 261*   BUN  --   --  41.7*  --   --   --  42.8* 46.2*   CR  --   --  1.75*  --   --   --  1.92* 2.15*   GFRESTIMATED  --   --  46*  --   --   --  41* 36*   MEG  --   --  9.8  --   --   --  9.7 10.5*   MAG  --   --   --   --   --   --   --  1.6*   PHOS  --   --   --   --   --   --   --  4.8*   ALBUMIN  --   --  3.9  --   --   --   --  4.4    BILITOTAL  --   --  0.4  --   --   --   --  0.5   ALKPHOS  --   --  82  --   --   --   --  91   AST  --   --  26  --   --   --   --  27   ALT  --   --  19  --   --   --   --  27    < > = values in this interval not displayed.     CBC  Recent Labs   Lab 06/21/24  1013   HGB 13.9   WBC 6.8      A1C 7.3*

## 2024-06-23 NOTE — CARE PLAN
Admitted/transferred from: Outside [facility]  Time of arrival on unit 9634  2 RN full  skin assessment completed by Lucero Justin  Skin assessment finding: issues found  -abrasion on left hand, healed abd incision,  bruise on left knee  Interventions/actions: other        Will continue to monitor.

## 2024-06-23 NOTE — PLAN OF CARE
/78   Pulse 71   Temp 97.5  F (36.4  C) (Oral)   Resp 16   SpO2 98%       Shift: 0530-8022  Isolation Status: Contact VRE  VS: VSS on RA, afebrile  Neuro: disoriented x 3 , oriented to self only  Behaviors: confused but cooperative with guidance  BG: ACHS, 183,177  Labs:  K: 5  Respiratory: WDL  Cardiac: WDL , On Tele NSR  Pain/Nausea: ISELA  Diet: NPO  IV Access: LPIV, RPIV  GI/: voiding without difficulty , NO BM this shift  Skin: abrasion on left hand, healed abd incision,  bruise on left knee   Mobility: Assist x1 walker   Events/Education: Moore removed this evening, 1 more PVR needed  Plan: continue plan of care ,Plan for MRI today

## 2024-06-23 NOTE — PROGRESS NOTES
Surgery cross-cover paged:  BUTCH Lopez on 7A, Liver team  FYI: K+ 6.2 at 1am  I see an order to place Apris and not to place. Do you want Paris in?  Thanks,  Mitali [phone]    MD ordered to meds to shift K+, see eMar, and place paris.

## 2024-06-23 NOTE — PLAN OF CARE
/76 (BP Location: Right arm)   Pulse 82   Temp 97.5  F (36.4  C) (Oral)   Resp 16   SpO2 98%     Shift: 5973-7976  VS: Stable on RA, afebrile.  Neuro: Disoriented, confused.  BG: ACHS with sliding scale insulin and carb coverage.  Labs: WDL  Respiratory: WDL  Pain/Nausea/PRN: Denies pain.  Diet: 2 g K+, fair appetite with wife at bedside to assist with feeding.   LDA: R and L PIV - SL  GI/: Moore catheter with orders to remove this afternoon. No BM this shift, last BM ~ 3 days ago according to spouse.   Skin: WDL  Mobility: Assist of 1 or 2 with walker, patient confused and impulsive.   Plan: Continue plan of care.     Handoff given to following RN.

## 2024-06-23 NOTE — PROGRESS NOTES
Admitted/transferred from: ED  Time of arrival on unit 1430  2 RN full  skin assessment completed by Jyoti Grigsby RN and Lucero WEISS RN.  Skin assessment finding: issues found Patient has TIMOTHY drain from surgery on 6/17, existing PD catheter, abdominal surgical incision with staple closure. Also, a few small scabs on neck, scabs are well healed, without drainage.    Interventions/actions: skin interventions No interventions needed at this time.       Will continue to monitor.

## 2024-06-23 NOTE — PLAN OF CARE
/77 (BP Location: Right arm)   Pulse 72   Temp 98.3  F (36.8  C) (Oral)   Resp 17   SpO2 98%       [id number]  Neuro: Pt. disoriented to place, time and situation. Sitter at bedside.   Behavior: Pt. calm & cooperative with cares.  Activity: Pt. up with 1 and walker.    Vital:  AVSS on RA.  LDAs: Right & left PIVs SL.    BG:  q 4 hours. 166-225.  Sliding scale insulin administered per order.    Cardiac: On tele., NSR.  Respiratory: LS clear with diminished bases.   GI/: Moore placed per order with 1,225cc output.  No stools this shift.  Skin: WNL  Pain/Nausea: Pt. denies pain or nausea.   Diet:  NPO except ice chips and meds.  Labs/Imaging: K+ 6.2 at 1am, MD notified and ordered meds to shift K+, see eMar. K+ 5.0 this morning.    Plan: Continue to follow POC and notify MD with change in status.

## 2024-06-23 NOTE — CONSULTS
[facility]  Transplant Nephrology Consult  Date of Admission:  6/22/2024  Today's Date: 06/23/2024  Requesting physician: Phil Vazquez MD    Recommendations:  - Would consider brain MRI for ongoing confusion.  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  Transaminases stable. Followed by transplant surgery        # Immunosuppression: Tacrolimus immediate release (goal 8-10) and Mycophenolic acid (dose 540 mg every 12 hours)   - Patient is in an immunosuppressed state and will continue to monitor for efficacy and toxicity of immunosuppression medications.   - Changes: Not at this time    # Infection Prophylaxis:   - PJP: Sulfa/TMP (Bactrim)- on hold  - CMV: Valganciclovir (Valcyte)    # Hypertension: Hypotensive;  Goal BP: < 140/90 (Hospitalization goal)   - Changes: Yes - yes recommend florinef 0.1mg MWF, give one dose today      # Diabetes: Controlled (HbA1c <7%) Last HbA1c: 4.8%    # Anemia in Chronic Renal Disease: Hgb: Stable      MAITE: No   - Iron studies: Not checked recently    # Mineral Bone Disorder:    - Secondary renal hyperparathyroidism; PTH level: Not checked recently        On treatment: None  - Vitamin D; level: Not checked recently        On supplement: No  - Calcium; level: Normal        On supplement: No  - Phosphorus; level: High        On supplement: No    # Electrolytes:   - Potassium; level: Normal        On supplement: No  - Magnesium; level: Low        On supplement: No  - Bicarbonate; level: Normal        On supplement: No  - Sodium; level: Normal    # Acute confusion: would recommend imaging of his brain.  -monitor tac levels     # Hyperkalemia:  resolved.  Start on florinef 0.1mg three times a  week MWF, one dose today     # Transplant History:  Etiology of Kidney Failure: ANGEL  Tx: Liver Tx  Transplant: 3/5/2024 (Liver)  Crossmatch at time of Tx: negative  DSA at time of Tx: No  Significant changes in immunosuppression: None  Significant transplant-related complications: None    Recommendations were communicated to the primary team verbally.    Seen and discussed with Dr. Laura Mccord NP  Pager: [phone]    Physician Attestation     I saw and evaluated Mary Lopez as part of a shared APRN/PA visit.     I personally reviewed the vital signs, medications, and labs.    I personally provided a substantive portion of care for this patient and I approve the care plan as written by the JOSELYN.  I was involved with Medical Decision Making including: Please see A&P for additional details of medical decision making.  MANAGEMENT DISCUSSED with the following over the past 24 hours: No acute indications for dialysis.  Recommend starting fludrocortisone 0.1 mg today, then MWF.  Low potassium diet.  Adjust tacrolimus dose.  Would consider brain MRI with ongoing confusion.     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 Treated with Methylprednisolone 500mg x3 (3/16-3/18) followed by taper currently maintained on tacrolimus and MMF with goal tacrolimus level of around 10, hypertension off medication, hyperlipidemia type 2 diabetes mellitus Maintain on Jardiance and insulin, pulmonary hypertension with history of fluid overload, atrial fibrillation, history of acute kidney injury, chronic  kidney disease stage II with baseline serum creatinine around 1 mg/dL, anemia due to chronic disease with a history of GI bleed secondary to varices who was visiting [place] for a wedding where he was found to be confused.      Labs drawn 6/21 since he was confused, which showed potassium of 7.3 with creatinine of 2.1 was advised to go to the emergency room, tacrolimus level was 12.5 on 6/11 with serum creatinine of 1.3 mg/dL and dose was decreased to 5 mg twice a day from total of 11 mg a day recently.  Patient also was noted to have increasing tremors in OSH.  There was a concern about tacrolimus toxicity for that reason dose was held, last dose was 6/21/24.     Patient is still confused and disoriented.  Wife denies recent illness.    No nausea or vomiting.  Voiding normally without issues    No hematuria or dysuria.     No chest pain or shortness of breath.            Review of Systems    The 10 point Review of Systems is negative other than noted in the HPI or here.     Past Medical History    I have reviewed this patient's medical history and updated it with pertinent information if needed.   Past Medical History:   Diagnosis Date    ANGEL (acute kidney injury) (H24)     Alcoholic cirrhosis of liver without ascites (H) 07/13/2023    Alcoholic hepatitis with ascites (H28) 10/03/2023    Alcoholic hepatitis without ascites (H28) 07/13/2023    Closed fracture of one rib of left side 09/14/2023    Concussion without loss of consciousness 03/11/2020    Decompensated hepatic cirrhosis (H) 09/15/2023    Diabetes mellitus, type 2 (H) 11/22/2023    Essential hypertension 03/11/2020    Ganglion cyst of wrist, right 04/18/2024    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)     Liver replaced by transplant (H) 03/05/2024    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    ACR PRAJAPATI 6-7/9, treated with steroids    Mild hyperlipidemia 12/07/2021    Persistent insomnia 07/13/2023    Portal hypertension (H) 07/13/2023     Scrotal abscess     Secondary esophageal 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 (H) 2023    Tobacco abuse disorder 2020    Type 2 diabetes mellitus with hyperglycemia (H) 2023       Past Surgical History   I have reviewed this patient's surgical history and updated it with pertinent information if needed.  Past Surgical History:   Procedure Laterality Date    BENCH LIVER  3/5/2024    Procedure: Bench liver;  Surgeon: Ryder Cortez MD;  Location: [facility] OR    CHOLECYSTECTOMY      COLONOSCOPY N/A 2024    Procedure: COLONOSCOPY, WITH POLYPECTOMY;  Surgeon: Jak Urbina MD;  Location: [facility]    CV RIGHT HEART CATH MEASUREMENTS RECORDED N/A 2024    Procedure: Right Heart Catheterization;  Surgeon: Alfred Tafoya MD;  Location:  HEART CARDI[facility] CATH LAB    IR LIVER BIOPSY PERCUTANEOUS  3/15/2024    IR RETROPERITONEAL ABSCESS DRAINAGE  2024    TONSILLECTOMY      TRANSPLANT LIVER RECIPIENT  DONOR N/A 3/5/2024    Procedure: Transplant liver recipient  donor, bile duct stent placement;  Surgeon: Ryder Cortez MD;  Location: [facility] OR    VASECTOMY         Family History   I have reviewed this patient's family history and updated it with pertinent information if needed.   Family History   Problem Relation Age of Onset    Anxiety Disorder Mother     Depression Mother     Bipolar Disorder Mother     Chronic Obstructive Pulmonary Disease Mother     Lung Cancer Mother 81    Morbid Obesity Father     Diabetes Father     Diabetes Type 2  Brother     Substance Abuse Maternal Grandfather     Substance Abuse Paternal Grandfather     Colon Cancer No family hx of     Liver Disease No family hx of        Social History   I have reviewed this patient's social history and updated it with pertinent information if needed. Mary Lopez  reports that he has quit smoking. His smoking use included cigarettes. He has never been exposed to tobacco smoke. His smokeless tobacco use includes  chew. He reports that he does not currently use alcohol after a past usage of about 12.0 standard drinks of alcohol per week. He reports that he does not currently use drugs.    Allergies   Allergies   Allergen Reactions    Other Food Allergy Anaphylaxis     Legumes (black beans, baked beans, chickpeas)    Pineapple Itching     Prior to Admission Medications   Current Facility-Administered Medications   Medication Dose Route Frequency Provider Last Rate Last Admin    insulin aspart (NovoLOG) injection (RAPID ACTING)  1-7 Units Subcutaneous Q4H Marlee Chinchilla MD   1 Units at 24 0700    sodium chloride (PF) 0.9% PF flush 3 mL  3 mL Intracatheter Q8H Marlee Chinchilla MD   3 mL at 24 195    sodium zirconium cyclosilicate (LOKELMA) packet 10 g  10 g Oral TID Marlee Chinchilla MD   10 g at 24 0839    Followed by    [START ON 2024] sodium zirconium cyclosilicate (LOKELMA) packet 10 g  10 g Oral Daily Marlee Chinchilla MD         Current Facility-Administered Medications   Medication Dose Route Frequency Provider Last Rate Last Admin       Physical Exam   Temp  Av.1  F (36.7  C)  Min: 97.5  F (36.4  C)  Max: 98.4  F (36.9  C)      Pulse  Av.8  Min: 72  Max: 91 Resp  Av.2  Min: 17  Max: 18  SpO2  Av.6 %  Min: 98 %  Max: 100 %     /77 (BP Location: Right arm)   Pulse 72   Temp 98.3  F (36.8  C) (Oral)   Resp 17   SpO2 98%    Date 24 07 - 24 0659   Shift 5941-0709 0061-3084 1063-9359 24 Hour Total   INTAKE   Shift Total       OUTPUT   Urine 260   260   Shift Total 260   260   Weight (kg)          Admit       GENERAL APPEARANCE: alert and no distress  HENT: mouth without ulcers or lesions  RESP: lungs clear to auscultation - no rales, rhonchi or wheezes  CV: regular rhythm, normal rate, no rub, no murmur  EDEMA: no LE edema bilaterally  ABDOMEN: soft, nondistended, nontender, bowel sounds normal  MS: extremities normal - no gross deformities noted, no evidence of  inflammation in joints, no muscle tenderness  SKIN: no rash    Data   CMP  Recent Labs   Lab 06/23/24  0659 06/23/24  0544 06/23/24  0420 06/23/24  0305 06/23/24  0222 06/23/24  0213 06/23/24  0100 06/22/24  2305 06/22/24  2109 06/21/24  1013   NA  --  138  --   --   --   --   --   --  137 131*   POTASSIUM  --  5.0  5.0  --   --   --  5.5* 6.2*  --  6.2* 7.3*   CHLORIDE  --  104  --   --   --   --   --   --  108* 103   CO2  --  22  --   --   --   --   --   --  17* 21*   ANIONGAP  --  12  --   --   --   --   --   --  12 7   * 169* 190* 166*   < >  --   --    < > 219* 261*   BUN  --  41.7*  --   --   --   --   --   --  42.8* 46.2*   CR  --  1.75*  --   --   --   --   --   --  1.92* 2.15*   GFRESTIMATED  --  46*  --   --   --   --   --   --  41* 36*   MEG  --  9.8  --   --   --   --   --   --  9.7 10.5*   MAG  --   --   --   --   --   --   --   --   --  1.6*   PHOS  --   --   --   --   --   --   --   --   --  4.8*   PROTTOTAL  --   --   --   --   --   --   --   --   --  7.5   ALBUMIN  --   --   --   --   --   --   --   --   --  4.4   BILITOTAL  --   --   --   --   --   --   --   --   --  0.5   ALKPHOS  --   --   --   --   --   --   --   --   --  91   AST  --   --   --   --   --   --   --   --   --  27   ALT  --   --   --   --   --   --   --   --   --  27    < > = values in this interval not displayed.     CBC  Recent Labs   Lab 06/21/24  1013   HGB 13.9   WBC 6.8   RBC 4.59   HCT 42.0   MCV 92   MCH 30.3   MCHC 33.1   RDW 13.0        INRNo lab results found in last 7 days.  ABGNo lab results found in last 7 days.   Urine Studies  Recent Labs   Lab Test 06/21/24  1013 03/30/24  2248 03/04/24  1042 02/23/24  1558 02/15/24  2015 11/09/23  1147   COLOR Yellow Yellow Yellow Yellow   < > Yellow   APPEARANCE Clear Clear Clear Clear   < > Clear   URINEGLC 500* Negative 500* Negative   < > >=1000*   URINEBILI Small* Negative Negative Small*   < > Small*   URINEKETONE Negative Negative Negative Negative   < >  Negative   SG 1.025 1.021 1.007 1.009   < > <=1.005   UBLD Negative Negative Negative Negative   < > Negative   URINEPH 5.0 5.0 5.0 5.0   < > 6.0   PROTEIN Negative Negative Negative Negative   < > Negative   UROBILINOGEN 0.2  --   --   --   --  2.0*   NITRITE Negative Negative Negative Negative   < > Negative   LEUKEST Negative Negative Negative Negative   < > Negative   RBCU 0-2 0 <1 <1   < > 0-2   WBCU 0-5 1 <1 <1   < > 0-5    < > = values in this interval not displayed.     No lab results found.  PTH  No lab results found.  Iron Studies  Recent Labs   Lab Test 12/19/23  0758 08/23/23  1018 08/09/23  1122 07/15/23  1048   IRON 135 120 118 92   KE 2,948* 4,261* 4,611*  --        IMAGING:  All imaging studies reviewed by me.

## 2024-06-23 NOTE — H&P
[facility]  I evaluated the patient today.  I reviewed the discharge plan with the fellow, and agree with the final assessment and plan for discharge as noted in the discharge summary.  I personally spent  30 minutes or less  today on discharge activities for this patient.      Ryder Cortez MD, EDDI  Professor of Surgery  [employer]  Surgical Director of Liver Transplant Program  Executive Medical Director of Pediatric Transplantation   Transplant Surgery  History and Physical    Mary Lopez  : 1970  MRN # [id number]    ADMIT DATE: 2024    PCP: Jimmie Hoyt    CHIEF COMPLAINT: Acute confusion in setting of recent liver transplant, hyperkalemia    History obtained from chart review as patient was delirious and there was no family member with him bedside    HPI: Mary is a 53 y.o. who is visiting [place] from 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 Treated with Methylprednisolone 500mg x3 (3/16-3/18) followed by taper currently maintained on tacrolimus and MMF with goal tacrolimus level of around 10, hypertension off medication, hyperlipidemia type 2 diabetes mellitus Maintain on Jardiance and insulin, pulmonary hypertension with history of fluid overload, atrial fibrillation, history of acute kidney injury, chronic kidney disease stage II with baseline serum creatinine around 1 mg/dL, anemia due to chronic disease with a history of GI bleed secondary to varices who was visiting [place] for a wedding where he was found to be confused. He had labs drawn  since he was confused, which showed potassium of 7.3 with creatinine of 2.1 was advised to go to the emergency room, tacrolimus level was 12.5 on  with serum creatinine of  1.3 mg/dL and dose was decreased to 5 mg twice a day from total of 11 mg a day recently.  Patient also was noted to have increasing tremors in OSH.  There was a concern about tacrolimus toxicity for that reason dose was withheld last Saturday, last dose was yesterday morning.  Patient denied using NSAIDs.Ccreatinine was 2.7 mg/dL potassium was 6.2, he was treated with continuous NEB  calcium chloride dextrose and insulin, but did not receive Lokelma.  Patient denies any recent acute illnesses, no recent antibiotic use.  No diarrhea he is rather constipated.  Has been somewhat confused with increasing tremors.  Has been feeling dizzy intermittently.      Patient was transferred to [facility] for further management.      PMH:  Past Medical History:   Diagnosis Date    ANGEL (acute kidney injury) (H24)     Alcoholic cirrhosis of liver without ascites (H) 07/13/2023    Alcoholic hepatitis with ascites (H28) 10/03/2023    Alcoholic hepatitis without ascites (H28) 07/13/2023    Closed fracture of one rib of left side 09/14/2023    Concussion without loss of consciousness 03/11/2020    Decompensated hepatic cirrhosis (H) 09/15/2023    Diabetes mellitus, type 2 (H) 11/22/2023    Essential hypertension 03/11/2020    Ganglion cyst of wrist, right 04/18/2024    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)     Liver replaced by transplant (H) 03/05/2024    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    ACR PRAJAPATI 6-7/9, treated with steroids    Mild hyperlipidemia 12/07/2021    Persistent insomnia 07/13/2023    Portal hypertension (H) 07/13/2023    Scrotal abscess     Secondary esophageal 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 (H) 07/13/2023    Tobacco abuse disorder 03/11/2020    Type 2 diabetes mellitus with hyperglycemia (H) 12/22/2023       PSH:  Past Surgical History:   Procedure Laterality Date    BENCH LIVER  3/5/2024    Procedure: Bench liver;  Surgeon: Ryder Cortez MD;  Location: [facility] OR    CHOLECYSTECTOMY       COLONOSCOPY N/A 2024    Procedure: COLONOSCOPY, WITH POLYPECTOMY;  Surgeon: Jak Urbina MD;  Location: [facility]    CV RIGHT HEART CATH MEASUREMENTS RECORDED N/A 2024    Procedure: Right Heart Catheterization;  Surgeon: Alfred Tafoya MD;  Location:  HEART CARDIAC CATH LAB    IR LIVER BIOPSY PERCUTANEOUS  3/15/2024    IR RETROPERITONEAL ABSCESS DRAINAGE  2024    TONSILLECTOMY      TRANSPLANT LIVER RECIPIENT  DONOR N/A 3/5/2024    Procedure: Transplant liver recipient  donor, bile duct stent placement;  Surgeon: Ryder Cortez MD;  Location: [facility] OR    VASECTOMY         MEDICATIONS:  Prior to Admission Medications   Prescriptions Last Dose Informant Patient Reported? Taking?   Continuous Blood Gluc Sensor (DEXCOM G7 SENSOR) MISC   No No   Sig: Change every 10 days.   Continuous Blood Gluc Sensor (DEXCOM G7 SENSOR) MISC   No No   Sig: Change every 10 days.   Glucagon (GVOKE HYPOPEN) 1 MG/0.2ML pen   No No   Sig: Inject the contents of 1 device under the skin into lower abdomen, outer thigh, or outer upper arm as needed for hypoglycemia. If no response after 15 minutes, additional 1 mg dose from a new device may be injected while waiting for emergency assistance.   Injection Device for insulin (CEQUR SIMPLICITY 2U) [id number]   No No   Si each every 3 days   Insulin Disposable Pump (OMNIPOD 5 G6 PODS, GEN 5,) MISC   No No   Si each every 3 days Change every 3 days.   Patient taking differently: 1 each every 3 days Change every 3 days. Patient has not started insulin pump therapy   Insulin Lispro-aabc, 1 U Dial, (LENOREV KWLIANGPEN) 100 UNIT/ML SOPN   No No   Sig: Inject 6 Units Subcutaneous 3 times daily (with meals) 6 units with meals, plus correction. Pt may use up to 30 units daily. This REPLACES Humalog/Novolog insulin.   QUEtiapine (SEROQUEL) 25 MG tablet   No No   Sig: Take 1 tablet (25 mg) by mouth at bedtime   acetaminophen (TYLENOL) 500 MG tablet   No No   Sig: Take  1-2 tablets (500-1,000 mg) by mouth every 6 hours as needed for mild pain   aspirin (ASA) 325 MG tablet   No No   Si tablet (325 mg) by Oral or Feeding Tube route daily for 84 days   blood glucose (NO BRAND SPECIFIED) test strip   No No   Sig: Use to test blood sugar two times daily or as directed. To accompany: Blood Glucose Monitor Brands: per insurance.   blood glucose monitoring (NO BRAND SPECIFIED) meter device kit   No No   Sig: Use to test blood sugar twice times daily or as directed. Preferred blood glucose meter OR supplies to accompany: Blood Glucose Monitor Brands: per insurance.   empagliflozin (JARDIANCE) 25 MG TABS tablet   No No   Sig: Take 1 tablet (25 mg) by mouth daily   fludrocortisone (FLORINEF) 0.1 MG tablet   No No   Sig: Take 1 tablet (0.1 mg) by mouth daily   Patient taking differently: Take 0.1 mg by mouth every other day   insulin degludec (TRESIBA FLEXTOUCH) 100 UNIT/ML pen   No No   Sig: Inject 26 Units Subcutaneous every morning   insulin pen needle (29G X 12.7MM) 29G X 12.7MM miscellaneous   No No   Sig: Use 1 pen needle every three days or as directed.   insulin pen needle (32G X 4 MM) 32G X 4 MM miscellaneous   No No   Sig: Use as directed by provider Per insurance coverage   insulin pen needle (BD PEN NEEDLE SHERRIE 2ND GEN) 32G X 4 MM miscellaneous   No No   Sig: USES 5 PER DAY   multivitamin w/minerals (THERA-VIT-M) tablet   No No   Sig: TAKE 1 TABLET BY MOUTH DAILY   mycophenolic acid (GENERIC EQUIVALENT) 180 MG EC tablet   No No   Sig: Take 3 tablets (540 mg) by mouth 2 times daily   omeprazole (PRILOSEC) 20 MG DR capsule   Yes No   Sig: Take 20 mg by mouth daily   sulfamethoxazole-trimethoprim (BACTRIM) 400-80 MG tablet   No No   Sig: Take 1 tablet by mouth every evening   tacrolimus (GENERIC EQUIVALENT) 5 MG capsule   No No   Sig: Take 2 capsules (10 mg) by mouth every 12 hours Total dose: 11mg BID   thin (NO BRAND SPECIFIED) lancets   No No   Sig: Use with lanceting device.  To accompany: Blood Glucose Monitor Brands: per insurance.   traMADol (ULTRAM) 50 MG tablet   No No   Sig: Take 1 tablet (50 mg) by mouth daily as needed for severe pain   ursodiol (ACTIGALL) 300 MG capsule   No No   Sig: Take 1 capsule (300 mg) by mouth 2 times daily      Facility-Administered Medications Last Administration Doses Remaining   2 mL bupivacaine (MARCAINE) preservative free injection 0.5% (20 mL vial) 5/10/2024  4:32 PM    2 mL bupivacaine (MARCAINE) preservative free injection 0.5% (20 mL vial) 5/10/2024  4:32 PM    lidocaine 1 % injection 2 mL 5/10/2024  4:32 PM    lidocaine 1 % injection 2 mL 5/10/2024  4:32 PM    triamcinolone (KENALOG-40) injection 40 mg 5/10/2024  4:32 PM    triamcinolone (KENALOG-40) injection 40 mg 5/10/2024  4:32 PM            ALLERGIES:     Allergies   Allergen Reactions    Other Food Allergy Anaphylaxis     Legumes (black beans, baked beans, chickpeas)    Pineapple Itching       FAMILY HISTORY:  Family History   Problem Relation Age of Onset    Anxiety Disorder Mother     Depression Mother     Bipolar Disorder Mother     Chronic Obstructive Pulmonary Disease Mother     Lung Cancer Mother 81    Morbid Obesity Father     Diabetes Father     Diabetes Type 2  Brother     Substance Abuse Maternal Grandfather     Substance Abuse Paternal Grandfather     Colon Cancer No family hx of     Liver Disease No family hx of        SOCIAL HISTORY:  Social History     Socioeconomic History    Marital status:      Spouse name: Not on file    Number of children: Not on file    Years of education: Not on file    Highest education level: Not on file   Occupational History    Occupation: Not working now   Tobacco Use    Smoking status: Former     Types: Cigarettes     Passive exposure: Never    Smokeless tobacco: Current     Types: Chew     Last attempt to quit: 1/1/2004    Tobacco comments:     Chew daily 1/8 of a tin per day   Vaping Use    Vaping status: Never Used   Substance and  Sexual Activity    Alcohol use: Not Currently     Alcohol/week: 12.0 standard drinks of alcohol     Types: 12 Standard drinks or equivalent per week     Comment: Sober since 6/25/2023    Drug use: Not Currently    Sexual activity: Yes     Partners: Female     Birth control/protection: Male Surgical   Other Topics Concern    Parent/sibling w/ CABG, MI or angioplasty before 65F 55M? No   Social History Narrative    Not on file     Social Determinants of Health     Financial Resource Strain: Low Risk  (12/22/2023)    Financial Resource Strain     Within the past 12 months, have you or your family members you live with been unable to get utilities (heat, electricity) when it was really needed?: No   Food Insecurity: No Food Insecurity (6/22/2024)    [facility]    Hunger Vital Sign     Worried About Running Out of Food in the Last Year: Never true     Ran Out of Food in the Last Year: Never true   Transportation Needs: No Transportation Needs (6/22/2024)    Received [facility]    PRAPARE - Transportation     Lack of Transportation (Medical): No     Lack of Transportation (Non-Medical): No   Physical Activity: Not on file   Stress: Not on file   Social Connections: Not on file   Interpersonal Safety: Low Risk  (12/22/2023)    Interpersonal Safety     Do you feel physically and emotionally safe where you currently live?: Yes     Within the past 12 months, have you been hit, slapped, kicked or otherwise physically hurt by someone?: No     Within the past 12 months, have you been humiliated or emotionally abused in other ways by your partner or ex-partner?: No   Housing Stability: Low Risk  (6/22/2024)    [facility]    Housing Stability Vital Sign     Unable to Pay for Housing in the Last Year: No     Number of Times Moved in the Last Year: 0     Homeless in the Last Year: No       PHYSICAL EXAM:  Blood pressure 108/64, pulse 88, temperature 98.2  F (36.8  C),  temperature source Oral, resp. rate 17, SpO2 100%.  GENERAL: Appears alert and oriented times three.   HEENT: Eye symmetrical and free of discharge bilaterally.   NECK: Supple and without lymphadenopathy.  CV: RRR, S1S2 present   RESPIRATORY: Respirations regular, even, and unlabored.   GI: Soft and non distended. Well healed surgical scar in upper abdomen. No organomegaly. Mild non specific tenderness in right abdomen  EXTREMITIES: warm  NEUROLOGIC: oriented x2, motor strength grossly normal  SKIN: No jaundice. No rashes or lesions.     LABS:  CBC:  Recent Labs   Lab Test 06/21/24  1013   WBC 6.8   RBC 4.59   HGB 13.9   HCT 42.0   MCV 92   MCH 30.3   MCHC 33.1   RDW 13.0          CMP:  Recent Labs   Lab Test 06/22/24  2109 06/21/24  1013    131*   POTASSIUM 6.2* 7.3*   CHLORIDE 108* 103   MEG 9.7 10.5*   CO2 17* 21*   BUN 42.8* 46.2*   CR 1.92* 2.15*   * 261*   AST  --  27   ALT  --  27   BILITOTAL  --  0.5   ALBUMIN  --  4.4   PROTTOTAL  --  7.5   ALKPHOS  --  91       IMAGING:  US Renal Complete Portable  Order: [id number]  Narrative    DATE OF STUDY: 6/22/2024 12:16 PM    STUDY: US RENAL KIDNEY    HISTORY: ANGEL    COMPARISONS: None    FINDINGS: The right kidney measures 13 x 6.5 x 5.8 cm. No abnormality demonstrated. Bladder volume 500 mL and grossly normal. The left kidney measures 12.5 x 5.8 x 6.8 cm. No abnormality. Lobulated renal contours bilaterally. No free fluid.    IMPRESSION:  1.  No renal mass stone cyst or hydronephrosis bilaterally.  2.  Normal bladder.  3.  No free fluid.    ASSESSMENT & PLAN:  Mary is a 53 y.o. 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 Treated with Methylprednisolone 500mg x3 (3/16-3/18) followed by taper currently maintained on tacrolimus and MMF  with goal tacrolimus level of around 10, hypertension off medication, hyperlipidemia type 2 diabetes mellitus Maintain on Jardiance and insulin, pulmonary hypertension with history of fluid overload, atrial fibrillation, history of acute kidney injury, chronic kidney disease stage II with baseline serum creatinine around 1 mg/dL, anemia due to chronic disease with a history of GI bleed secondary to varices who was visiting [place] for a wedding where he was found to be confused.   Transferred to [facility] due to delirium, hyperkalemia, and ANGEL on CKD. Possible tacrolimus toxicity     Plan-  Admit to 7A floor  NPO with ivf  Consulted Transplant Nephrology stat- appreciate recs  K shifted with Hyperkalemia protocol with insulin/dextrose, lasix, lokelma, HCO3 drip in discussion with Transplant neph  Telemetry, periodic K checks  Hold ASA, Tacrolimus  Sliding scale insulin for hyperglycemia    Plan D/W Dr Juan Manuel Simpson, fellow    Marlee DIAZ  PGY1 General Surgery  [facility]  Surgery Cross Cover

## 2024-06-23 NOTE — PHARMACY-ADMISSION MEDICATION HISTORY
Pharmacist Admission Medication History    Admission medication history is complete. The information provided in this note is only as accurate as the sources available at the time of the update.    Information Source(s): Family member, Hospital records, and CareEverywhere/SureScripts via in-person    Pertinent Information:   - Pt's wife manages own medications and appears to be an accurate/reliable historian. Could not interview pt as p/w AMS.  - Pt was a txfr from [place] [place] [facility] p/w acute confusion. The last time the pt took PTA medications was prior to this encounter on 6/20.  - Fludrocortisone was initially started for <Bps but dcd when resolved per wife.  - Pt recently switched from Humalog to Lispro-aabc, same dosing for insulin but wife has not had time to  new formulation as of yet from pharmacy. Left current medication in list below to avoid confusion.  - Pt was taking BACTRIM PTA for PJP ppx. At OSH pt was switched to Atovaquone d/t ANGEL/>K+. Left on list for clinical decision making per team. When asked about other PJP medications in the past, pt has trialed pentamadine but did not take d/t not enjoying process/taste of medication. Has not tried dapsone.    Changes made to PTA medication list:  Deleted:   Bupivacaine PF inj 0.5% facility administered  APAP prn  Fludrocortisone tab  Lidocaine 1% inj facility administered  Triamcinolone 40 mg inj facility administered    Allergies reviewed with patient and updates made in EHR: yes    Medication History Completed By: Fernando Murphy [facility] 6/23/2024 10:52 AM    PTA Med List   Medication Sig Last Dose    aspirin (ASA) 325 MG tablet 1 tablet (325 mg) by Oral or Feeding Tube route daily for 84 days 6/20/2024 at AM    empagliflozin (JARDIANCE) 25 MG TABS tablet Take 1 tablet (25 mg) by mouth daily 6/20/2024 at AM    Glucagon (GVOKE HYPOPEN) 1 MG/0.2ML pen Inject the contents of 1 device under the skin into lower abdomen, outer thigh, or outer upper arm  as needed for hypoglycemia. If no response after 15 minutes, additional 1 mg dose from a new device may be injected while waiting for emergency assistance. More than a month at prn    insulin degludec (TRESIBA FLEXTOUCH) 100 UNIT/ML pen Inject 26 Units Subcutaneous every morning 6/20/2024 at AM    Insulin Lispro-aabc, 1 U Dial, (LYUMJEV KWIKPEN) 100 UNIT/ML SOPN Inject 6 Units Subcutaneous 3 times daily (with meals) 6 units with meals, plus correction. Pt may use up to 30 units daily. This REPLACES Humalog/Novolog insulin. 6/20/04 at AM    multivitamin w/minerals (THERA-VIT-M) tablet TAKE 1 TABLET BY MOUTH DAILY 6/20/2024 at AM    mycophenolic acid (GENERIC EQUIVALENT) 180 MG EC tablet Take 3 tablets (540 mg) by mouth 2 times daily 6/20/2024 at PM    omeprazole (PRILOSEC) 20 MG DR capsule Take 20 mg by mouth daily 6/20/2024 at AM    QUEtiapine (SEROQUEL) 25 MG tablet Take 1 tablet (25 mg) by mouth at bedtime 6/20/2024 at PM    tacrolimus (GENERIC EQUIVALENT) 5 MG capsule Take 2 capsules (10 mg) by mouth every 12 hours Total dose: 11mg BID 6/20/2024 at AM    traMADol (ULTRAM) 50 MG tablet Take 1 tablet (50 mg) by mouth daily as needed for severe pain Past Week at PRN    ursodiol (ACTIGALL) 300 MG capsule Take 1 capsule (300 mg) by mouth 2 times daily 6/20/2024 at AM     Abdulaziz RivasD., R.Ph., PGY1 Resident

## 2024-06-23 NOTE — CARE PLAN
/64 (BP Location: Right arm)   Pulse 88   Temp 98.2  F (36.8  C) (Oral)   Resp 17   SpO2 100%     Shift: 5411-1195  Isolation Status: Contact VRE  VS: VSS on RA, afebrile  Neuro: disoriented x 3 , oriented to self only  Behaviors: confused but cooperative with guidance  BG: q4  Labs: critical K- 6.2  Respiratory: WDL  Cardiac: WDL , On Tele NSR  Pain/Nausea: ISELA  Diet: NPO  IV Access: LPIV, RPIV  Infusion(s): sodium bicarb @ 100 ml/hr  GI/: voiding without difficulty , NO BM this shift  Skin: abrasion on left hand, healed abd incision,  bruise on left knee   Mobility: Assist x1 walker   Events/Education: Admitted from outside hospital , EKG ordered due to high K- sinus rhythm  Plan: continue plan of care

## 2024-06-24 ENCOUNTER — APPOINTMENT (OUTPATIENT)
Dept: MRI IMAGING | Facility: CLINIC | Age: 54
DRG: 640 | End: 2024-06-24
Attending: NURSE PRACTITIONER
Payer: COMMERCIAL

## 2024-06-24 LAB
ALBUMIN SERPL BCG-MCNC: 4.1 G/DL (ref 3.5–5.2)
ALBUMIN UR-MCNC: NEGATIVE MG/DL
ALP SERPL-CCNC: 86 U/L (ref 40–150)
ALT SERPL W P-5'-P-CCNC: 18 U/L (ref 0–70)
ANION GAP SERPL CALCULATED.3IONS-SCNC: 10 MMOL/L (ref 7–15)
APPEARANCE UR: CLEAR
AST SERPL W P-5'-P-CCNC: 26 U/L (ref 0–45)
ATRIAL RATE - MUSE: 73 BPM
BILIRUB DIRECT SERPL-MCNC: <0.2 MG/DL (ref 0–0.3)
BILIRUB SERPL-MCNC: 0.5 MG/DL
BILIRUB UR QL STRIP: NEGATIVE
BUN SERPL-MCNC: 36.3 MG/DL (ref 6–20)
CALCIUM SERPL-MCNC: 9.8 MG/DL (ref 8.6–10)
CHLORIDE SERPL-SCNC: 103 MMOL/L (ref 98–107)
COLOR UR AUTO: ABNORMAL
CREAT SERPL-MCNC: 1.42 MG/DL (ref 0.67–1.17)
DEPRECATED HCO3 PLAS-SCNC: 24 MMOL/L (ref 22–29)
DIASTOLIC BLOOD PRESSURE - MUSE: NORMAL MMHG
EGFRCR SERPLBLD CKD-EPI 2021: 59 ML/MIN/1.73M2
ERYTHROCYTE [DISTWIDTH] IN BLOOD BY AUTOMATED COUNT: 12.9 % (ref 10–15)
GLUCOSE BLDC GLUCOMTR-MCNC: 130 MG/DL (ref 70–99)
GLUCOSE BLDC GLUCOMTR-MCNC: 138 MG/DL (ref 70–99)
GLUCOSE BLDC GLUCOMTR-MCNC: 154 MG/DL (ref 70–99)
GLUCOSE BLDC GLUCOMTR-MCNC: 180 MG/DL (ref 70–99)
GLUCOSE SERPL-MCNC: 137 MG/DL (ref 70–99)
GLUCOSE UR STRIP-MCNC: >=1000 MG/DL
HCT VFR BLD AUTO: 35.4 % (ref 40–53)
HGB BLD-MCNC: 12.4 G/DL (ref 13.3–17.7)
HGB UR QL STRIP: NEGATIVE
INTERPRETATION ECG - MUSE: NORMAL
KETONES UR STRIP-MCNC: 10 MG/DL
LEUKOCYTE ESTERASE UR QL STRIP: NEGATIVE
MAGNESIUM SERPL-MCNC: 1.3 MG/DL (ref 1.7–2.3)
MCH RBC QN AUTO: 31.2 PG (ref 26.5–33)
MCHC RBC AUTO-ENTMCNC: 35 G/DL (ref 31.5–36.5)
MCV RBC AUTO: 89 FL (ref 78–100)
MUCOUS THREADS #/AREA URNS LPF: PRESENT /LPF
NITRATE UR QL: NEGATIVE
P AXIS - MUSE: 52 DEGREES
PH UR STRIP: 5.5 [PH] (ref 5–7)
PHOSPHATE SERPL-MCNC: 3.5 MG/DL (ref 2.5–4.5)
PLATELET # BLD AUTO: 149 10E3/UL (ref 150–450)
POTASSIUM SERPL-SCNC: 4.3 MMOL/L (ref 3.4–5.3)
PR INTERVAL - MUSE: 154 MS
PROT SERPL-MCNC: 6.9 G/DL (ref 6.4–8.3)
QRS DURATION - MUSE: 94 MS
QT - MUSE: 370 MS
QTC - MUSE: 407 MS
R AXIS - MUSE: -10 DEGREES
RBC # BLD AUTO: 3.97 10E6/UL (ref 4.4–5.9)
RBC URINE: 0 /HPF
SODIUM SERPL-SCNC: 137 MMOL/L (ref 135–145)
SP GR UR STRIP: 1.02 (ref 1–1.03)
SYSTOLIC BLOOD PRESSURE - MUSE: NORMAL MMHG
T AXIS - MUSE: 46 DEGREES
TACROLIMUS BLD-MCNC: 3.9 UG/L (ref 5–15)
TME LAST DOSE: ABNORMAL H
TME LAST DOSE: ABNORMAL H
UROBILINOGEN UR STRIP-MCNC: NORMAL MG/DL
VENTRICULAR RATE- MUSE: 73 BPM
WBC # BLD AUTO: 6.3 10E3/UL (ref 4–11)
WBC URINE: 0 /HPF

## 2024-06-24 PROCEDURE — 83735 ASSAY OF MAGNESIUM: CPT | Performed by: NURSE PRACTITIONER

## 2024-06-24 PROCEDURE — 36415 COLL VENOUS BLD VENIPUNCTURE: CPT | Performed by: NURSE PRACTITIONER

## 2024-06-24 PROCEDURE — 80053 COMPREHEN METABOLIC PANEL: CPT | Performed by: NURSE PRACTITIONER

## 2024-06-24 PROCEDURE — 70553 MRI BRAIN STEM W/O & W/DYE: CPT

## 2024-06-24 PROCEDURE — 80197 ASSAY OF TACROLIMUS: CPT | Performed by: NURSE PRACTITIONER

## 2024-06-24 PROCEDURE — 85041 AUTOMATED RBC COUNT: CPT | Performed by: NURSE PRACTITIONER

## 2024-06-24 PROCEDURE — 84100 ASSAY OF PHOSPHORUS: CPT | Performed by: NURSE PRACTITIONER

## 2024-06-24 PROCEDURE — 81003 URINALYSIS AUTO W/O SCOPE: CPT | Performed by: NURSE PRACTITIONER

## 2024-06-24 PROCEDURE — 250N000013 HC RX MED GY IP 250 OP 250 PS 637

## 2024-06-24 PROCEDURE — 250N000011 HC RX IP 250 OP 636: Mod: JZ | Performed by: NURSE PRACTITIONER

## 2024-06-24 PROCEDURE — 99233 SBSQ HOSP IP/OBS HIGH 50: CPT | Performed by: INTERNAL MEDICINE

## 2024-06-24 PROCEDURE — 255N000002 HC RX 255 OP 636: Mod: JZ | Performed by: TRANSPLANT SURGERY

## 2024-06-24 PROCEDURE — 250N000013 HC RX MED GY IP 250 OP 250 PS 637: Performed by: NURSE PRACTITIONER

## 2024-06-24 PROCEDURE — 250N000012 HC RX MED GY IP 250 OP 636 PS 637: Performed by: NURSE PRACTITIONER

## 2024-06-24 PROCEDURE — 70553 MRI BRAIN STEM W/O & W/DYE: CPT | Mod: 26 | Performed by: RADIOLOGY

## 2024-06-24 PROCEDURE — 120N000011 HC R&B TRANSPLANT UMMC

## 2024-06-24 PROCEDURE — A9585 GADOBUTROL INJECTION: HCPCS | Mod: JZ | Performed by: TRANSPLANT SURGERY

## 2024-06-24 PROCEDURE — 82310 ASSAY OF CALCIUM: CPT | Performed by: NURSE PRACTITIONER

## 2024-06-24 RX ORDER — NALOXONE HYDROCHLORIDE 0.4 MG/ML
0.2 INJECTION, SOLUTION INTRAMUSCULAR; INTRAVENOUS; SUBCUTANEOUS
Status: DISCONTINUED | OUTPATIENT
Start: 2024-06-24 | End: 2024-06-27 | Stop reason: HOSPADM

## 2024-06-24 RX ORDER — NALOXONE HYDROCHLORIDE 0.4 MG/ML
0.4 INJECTION, SOLUTION INTRAMUSCULAR; INTRAVENOUS; SUBCUTANEOUS
Status: DISCONTINUED | OUTPATIENT
Start: 2024-06-24 | End: 2024-06-27 | Stop reason: HOSPADM

## 2024-06-24 RX ORDER — GADOBUTROL 604.72 MG/ML
7.5 INJECTION INTRAVENOUS ONCE
Status: COMPLETED | OUTPATIENT
Start: 2024-06-24 | End: 2024-06-24

## 2024-06-24 RX ORDER — MAGNESIUM OXIDE 400 MG/1
400 TABLET ORAL DAILY
Status: DISCONTINUED | OUTPATIENT
Start: 2024-06-24 | End: 2024-06-26

## 2024-06-24 RX ORDER — AMOXICILLIN 250 MG
1-2 CAPSULE ORAL 2 TIMES DAILY
Status: DISCONTINUED | OUTPATIENT
Start: 2024-06-24 | End: 2024-06-27 | Stop reason: HOSPADM

## 2024-06-24 RX ORDER — TACROLIMUS 5 MG/1
5 CAPSULE ORAL
Status: DISCONTINUED | OUTPATIENT
Start: 2024-06-24 | End: 2024-06-25

## 2024-06-24 RX ORDER — ACETAMINOPHEN 325 MG/1
975 TABLET ORAL EVERY 8 HOURS PRN
Status: DISCONTINUED | OUTPATIENT
Start: 2024-06-24 | End: 2024-06-27 | Stop reason: HOSPADM

## 2024-06-24 RX ORDER — TRAMADOL HYDROCHLORIDE 50 MG/1
50 TABLET ORAL DAILY PRN
Status: DISCONTINUED | OUTPATIENT
Start: 2024-06-24 | End: 2024-06-27 | Stop reason: HOSPADM

## 2024-06-24 RX ORDER — QUETIAPINE FUMARATE 25 MG/1
25 TABLET, FILM COATED ORAL EVERY EVENING
Status: DISCONTINUED | OUTPATIENT
Start: 2024-06-24 | End: 2024-06-26

## 2024-06-24 RX ORDER — POLYETHYLENE GLYCOL 3350 17 G/17G
17 POWDER, FOR SOLUTION ORAL DAILY
Status: DISCONTINUED | OUTPATIENT
Start: 2024-06-24 | End: 2024-06-27 | Stop reason: HOSPADM

## 2024-06-24 RX ORDER — MAGNESIUM SULFATE HEPTAHYDRATE 40 MG/ML
2 INJECTION, SOLUTION INTRAVENOUS ONCE
Status: COMPLETED | OUTPATIENT
Start: 2024-06-24 | End: 2024-06-24

## 2024-06-24 RX ADMIN — URSODIOL 300 MG: 300 CAPSULE ORAL at 20:02

## 2024-06-24 RX ADMIN — MAGNESIUM SULFATE HEPTAHYDRATE 2 G: 2 INJECTION, SOLUTION INTRAVENOUS at 11:28

## 2024-06-24 RX ADMIN — TACROLIMUS 5 MG: 5 CAPSULE ORAL at 17:41

## 2024-06-24 RX ADMIN — POLYETHYLENE GLYCOL 3350 17 G: 17 POWDER, FOR SOLUTION ORAL at 11:28

## 2024-06-24 RX ADMIN — MAGNESIUM OXIDE TAB 400 MG (241.3 MG ELEMENTAL MG) 400 MG: 400 (241.3 MG) TAB at 14:28

## 2024-06-24 RX ADMIN — DAPSONE 50 MG: 25 TABLET ORAL at 08:11

## 2024-06-24 RX ADMIN — QUETIAPINE FUMARATE 25 MG: 25 TABLET ORAL at 20:01

## 2024-06-24 RX ADMIN — INSULIN DEGLUDEC 13 UNITS: 100 INJECTION, SOLUTION SUBCUTANEOUS at 11:57

## 2024-06-24 RX ADMIN — FLUDROCORTISONE ACETATE 0.1 MG: 0.1 TABLET ORAL at 08:25

## 2024-06-24 RX ADMIN — URSODIOL 300 MG: 300 CAPSULE ORAL at 08:11

## 2024-06-24 RX ADMIN — MYCOPHENOLIC ACID 540 MG: 180 TABLET, DELAYED RELEASE ORAL at 20:02

## 2024-06-24 RX ADMIN — SODIUM ZIRCONIUM CYCLOSILICATE 10 G: 10 POWDER, FOR SUSPENSION ORAL at 08:11

## 2024-06-24 RX ADMIN — DOCUSATE SODIUM 50 MG AND SENNOSIDES 8.6 MG 2 TABLET: 8.6; 5 TABLET, FILM COATED ORAL at 20:01

## 2024-06-24 RX ADMIN — GADOBUTROL 7.5 ML: 604.72 INJECTION INTRAVENOUS at 10:03

## 2024-06-24 RX ADMIN — MYCOPHENOLIC ACID 540 MG: 180 TABLET, DELAYED RELEASE ORAL at 08:11

## 2024-06-24 RX ADMIN — TACROLIMUS 2 MG: 1 CAPSULE ORAL at 08:11

## 2024-06-24 RX ADMIN — DOCUSATE SODIUM 50 MG AND SENNOSIDES 8.6 MG 2 TABLET: 8.6; 5 TABLET, FILM COATED ORAL at 11:28

## 2024-06-24 ASSESSMENT — ACTIVITIES OF DAILY LIVING (ADL)
ADLS_ACUITY_SCORE: 28
ADLS_ACUITY_SCORE: 28
ADLS_ACUITY_SCORE: 43
ADLS_ACUITY_SCORE: 28
ADLS_ACUITY_SCORE: 28
ADLS_ACUITY_SCORE: 43
ADLS_ACUITY_SCORE: 43
ADLS_ACUITY_SCORE: 42
ADLS_ACUITY_SCORE: 43
ADLS_ACUITY_SCORE: 28
ADLS_ACUITY_SCORE: 43
ADLS_ACUITY_SCORE: 43
ADLS_ACUITY_SCORE: 28
ADLS_ACUITY_SCORE: 43
ADLS_ACUITY_SCORE: 28

## 2024-06-24 NOTE — PLAN OF CARE
Vitals: /88 (BP Location: Left arm)   Pulse 91   Temp 98.7  F (37.1  C) (Oral)   Resp 16   Wt 81.7 kg (180 lb 1.6 oz)   SpO2 95%   BMI 26.98 kg/m    Cardiac monitoring discontinued.   Endocrine: Blood glucose 130, Tresiba, Novolog correction and carb coverage.  Labs: Magensium 1.3, other labs stable.  Pain: Denies pain.  PRN's: Magnesium 2 GM IV.  Diet: 2 GM K+, did not eat breakfast or lunch.  LDA: PIV X2, saline locked.  GI: BM 6/24.  : Needs UA with next void. First attempt patient put the wipe in the container and voided in the toilet.   Skin: Skin is intact.  Neuro: Confused, oriented to self, wife is here. Illogical speech and thinking.   Mobility: Stand by assist.  Education: n/a  Plan:  Brain MRI completed.

## 2024-06-24 NOTE — PLAN OF CARE
/77 (BP Location: Right arm)   Pulse 76   Temp 98.2  F (36.8  C) (Oral)   Resp 16   SpO2 97%     Shift: [id number]  Isolation Status: contact   VS: stable on RA, afebrile  Neuro: Aox1 disoriented to time place and situation   Behaviors: patient became more confused after waking up and would reorient the patient reassure the patient. Bed alarm on at all times and has a bedside attendant.   BG: AC HS 2AM 138  Labs: K+ 5.0  Respiratory: RA   Cardiac: WNL  Pain/Nausea: denied pain and nausea   PRN: none   Diet: 2 gram k+  IV Access: PIV X2   Infusion(s): none   Lines/Drains: none   GI/: continent of bowel and bladder LBM was 6/23  Skin: intact   Mobility: assist 1 with walker   Events/Education: none  Plan: will have a Brain MRI today      Tobacco chew was removed from the patient and it is on the med cart.

## 2024-06-24 NOTE — PROGRESS NOTES
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 He was visiting [place] for a wedding where he was found to be confused. Presented to OSH and was found to have hyperkalemia (K 7.3). Transferred to [facility] for further management.     Hx DDLT 3/5/24: POD#111. LFTs WNL.     Immunosuppressed status secondary to medications:  Maintenance:   - Myfortic 540 mg BID   - Tacrolimus initially held d/t tacrolimus toxicity. Continue 5 mg BID. New goal level 5-8.     Neuro:  AMS: Likely multifactorial r/t tacrolimus toxicity, insomnia.    - Tacrolimus initially held, now restarted at lower dose.    - MRI brain today with no e/o PRES, no acute intracranial pathology. Nothing to explain encephalopathy.    - Restart PTA Seroquel.    - Delirium precautions.   - Infectious workup negative to date.     Hematology:   Anemia of chronic disease: Hgb ~12, stable.    Cardiorespiratory: No issues.    GI/Nutrition: 2 grams K diet.  Constipation: No BM x 4 days. Start bowel regimen.      Endocrine:   DM2: PTA on degludec, correction scale + fixed meal humalog.   - Restarted degludec at 1/2 dose with confusion   - Started sliding scale Novolog, carb coverage while inpatient.     Fluid/Electrolytes/Neph:   Hypomagnesemia: Mg 1.3.    - Replace 2 grams IV and start Mag-Ox 400 mg daily.   Hyperkalemia: likely r/t ANGEL. Resolved. Plan:   - Florinef 0.1 mg  [facility].    - Lokelma 10 grams T/Th/S   - Bactrim changed to dapsone (G6PD WNL).    - 2 gram potassium diet  ANGEL: Likely r/t elevated tacrolimus levels. Cr 1.4, trending down. Baseline Cr 0.7-0.9.    - Transplant nephrology consulted, appreciate recommendations.     Infectious disease: No issues.     MSK:  Rotator cuff injury: PTA on Tramadol. Patient encouraged to use PRN Tylenol until AMS resolves.     Prophylaxis: DVT (mechanical), fall, pneumocystis (Stop Bactrim d/t hyperkalemia, changed to dapsone, G6PD WNL)    Disposition: 7A, plan for discharge tomorrow if confusion resolves.      JOSELYN/Fellow/Resident Provider: Fabi Aguirre NP [id number]     Faculty: Joanna Goldman M.D.  __________________________________________________________________  Transplant History:    3/5/2024 (Liver), Postoperative day: 111     Interval History: History is obtained from the patient, spouse  Overnight events: No acute events overnight. Confusion improving overall. Spouse reports last BM was on Thursday.     ROS:   A 10-point review of systems was negative except as noted above.    Curent Meds:  Current Facility-Administered Medications   Medication Dose Route Frequency Provider Last Rate Last Admin    dapsone (ACZONE) tablet 50 mg  50 mg Oral Daily Fabi Aguirre NP   50 mg at 06/24/24 0811    fludrocortisone (FLORINEF) tablet 0.1 mg  0.1 mg Oral Once per day on Monday Wednesday Friday Fabi Aguirre NP   0.1 mg at 06/24/24 0825    insulin aspart (NovoLOG) injection (RAPID ACTING)  1-7 Units Subcutaneous TID AC Fabi Aguirre NP   1 Units at 06/23/24 1819    insulin aspart (NovoLOG) injection (RAPID ACTING)  1-5 Units Subcutaneous At Bedtime Fabi Aguirre NP        insulin aspart (NovoLOG) injection (RAPID ACTING)   Subcutaneous TID w/meals Fabi Aguirre NP   1 Units at 06/23/24 2015    insulin degludec (TRESIBA) 100 UNIT/ML injection 13 Units  13 Units Subcutaneous Fabi Wheat NP   13  Units at 06/24/24 1157    magnesium oxide (MAG-OX) tablet 400 mg  400 mg Oral Daily Fabi Aguirre NP   400 mg at 06/24/24 1428    mycophenolic acid (GENERIC EQUIVALENT) EC tablet 540 mg  540 mg Oral BID Fabi Aguirre NP   540 mg at 06/24/24 0811    polyethylene glycol (MIRALAX) Packet 17 g  17 g Oral Daily Fabi Aguirre NP   17 g at 06/24/24 1128    QUEtiapine (SEROquel) tablet 25 mg  25 mg Oral QPM Fbai Aguirre NP        senna-docusate (SENOKOT-S/PERICOLACE) 8.6-50 MG per tablet 1-2 tablet  1-2 tablet Oral BID Fabi Aguirre NP   2 tablet at 06/24/24 1128    sodium chloride (PF) 0.9% PF flush 3 mL  3 mL Intracatheter Q8H Marlee Chinchilla MD   3 mL at 06/24/24 1129    [START ON 6/25/2024] sodium zirconium cyclosilicate (LOKELMA) packet 10 g  10 g Oral Once per day on Tuesday Thursday Saturday Fabi Aguirre NP        tacrolimus (GENERIC EQUIVALENT) capsule 5 mg  5 mg Oral BID IS Fabi Aguirre NP        ursodiol (ACTIGALL) capsule 300 mg  300 mg Oral BID Fabi Aguirre NP   300 mg at 06/24/24 0811       Physical Exam:     Admit Weight: 81.7 kg (180 lb 1.6 oz)    Current Vitals:   /86 (BP Location: Left arm)   Pulse 98   Temp 97.9  F (36.6  C) (Oral)   Resp 16   Wt 81.7 kg (180 lb 1.6 oz)   SpO2 90%   BMI 26.98 kg/m      Vital sign ranges:    Temp:  [97.5  F (36.4  C)-98.7  F (37.1  C)] 97.9  F (36.6  C)  Pulse:  [59-98] 98  Resp:  [16] 16  BP: ()/(61-92) 116/86  SpO2:  [90 %-99 %] 90 %    General Appearance: in no apparent distress.   Skin: normal, warm, dry  Heart: perfused  Lungs: unlabored on RA  Abdomen: abdomen soft  :  Moore removed  Extremities: edema: none noted.  Neurologic: Alert, confused. Tremor absent..     Frailty Scores          12/19/2023   Frailty Scores   Final Score Frail   Final Score Number 4      Details                   Data:   CMP  Recent Labs   Lab 06/24/24  0814 06/24/24  0624 06/23/24  0659 06/23/24  0544  06/22/24  2109 06/21/24  1013   NA  --  137  --  138   < > 131*   POTASSIUM  --  4.3  --  5.0  5.0   < > 7.3*   CHLORIDE  --  103  --  104   < > 103   CO2  --  24  --  22   < > 21*   * 137*   < > 169*   < > 261*   BUN  --  36.3*  --  41.7*   < > 46.2*   CR  --  1.42*  --  1.75*   < > 2.15*   GFRESTIMATED  --  59*  --  46*   < > 36*   MEG  --  9.8  --  9.8   < > 10.5*   MAG  --  1.3*  --   --   --  1.6*   PHOS  --  3.5  --   --   --  4.8*   ALBUMIN  --  4.1  --  3.9  --  4.4   BILITOTAL  --  0.5  --  0.4  --  0.5   ALKPHOS  --  86  --  82  --  91   AST  --  26  --  26  --  27   ALT  --  18  --  19  --  27    < > = values in this interval not displayed.     CBC  Recent Labs   Lab 06/24/24  0624 06/21/24  1013   HGB 12.4* 13.9   WBC 6.3 6.8   * 170   A1C  --  7.3*     I have reviewed history, examined patient and discussed plan with the fellow/resident/JOSELYN.  I concur with the findings in this note.

## 2024-06-24 NOTE — PROGRESS NOTES
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  Transaminases stable. Followed by transplant surgery      # Immunosuppression: Tacrolimus immediate release (goal 8-10) and Mycophenolic acid (dose 540 mg every 12 hours)   - Patient is in an immunosuppressed state and will continue to monitor for efficacy and toxicity of immunosuppression medications.   - Changes: Not at this time    # Infection Prophylaxis:   - PJP: Dapsone- switched from bactrim due to critical hyperK (nl G6PD)  - CMV: Valganciclovir (Valcyte)    # Hypertension: Controlled;  Goal BP: < 140/90 (Hospitalization goal)   - Changes: Not at this time continue fllorinef 0.1 mg MWF for hyperK    # Diabetes: Controlled (HbA1c <7%) Last HbA1c: 4.8%    # Anemia in Chronic Renal Disease: Hgb: Stable      MAITE: No     # HypoMg: replete    # Hyperkalemia:  resolved.  Continue Florinef 0.1g po MWF, reduce lokelma to 10 g TTS    # Transplant History:  Etiology of Kidney Failure: ANGEL  Tx: Liver Tx  Transplant: 3/5/2024 (Liver)  Crossmatch at time of Tx: negative  DSA at time of Tx: No  Significant changes in immunosuppression: None  Significant transplant-related complications: None    Recommendations were communicated to the primary team verbally.    Veronica Cabral MD  Pager: [phone]    REASON FOR CONSULT   Liver transplant, ANGEL    History of Present Illness     Remains confused  Afebrile  Denies any headaches, nausea, vomiting  UOP-2.9L  /24h    Review of Systems    The 10 point Review of Systems is negative other than noted in the HPI or here.     Allergies   Allergies   Allergen Reactions    Other Food Allergy Anaphylaxis     Legumes (black beans, baked beans, chickpeas)    Pineapple Itching     Prior to Admission Medications   Current Facility-Administered Medications   Medication Dose Route Frequency Provider Last Rate Last Admin    dapsone (ACZONE) tablet 50 mg  50 mg Oral Daily Fabi Aguirre NP   50 mg at 06/24/24 0811    fludrocortisone (FLORINEF) tablet 0.1 mg  0.1 mg Oral Once per day on Monday Wednesday Friday Fabi Aguirre NP   0.1 mg at 06/24/24 0825    insulin aspart (NovoLOG) injection (RAPID ACTING)  1-7 Units Subcutaneous TID AC Fabi Aguirre NP   1 Units at 06/23/24 1819    insulin aspart (NovoLOG) injection (RAPID ACTING)  1-5 Units Subcutaneous At Bedtime Fabi Aguirre NP        insulin aspart (NovoLOG) injection (RAPID ACTING)   Subcutaneous TID w/meals Fabi Aguirre NP   1 Units at 06/23/24 2015    insulin degludec (TRESIBA) 100 UNIT/ML injection 13 Units  13 Units Subcutaneous QAM Fabi Aguirre NP   13 Units at 06/24/24 1157    mycophenolic acid (GENERIC EQUIVALENT) EC tablet 540 mg  540 mg Oral BID Fabi Aguirre NP   540 mg at 06/24/24 0811    polyethylene glycol (MIRALAX) Packet 17 g  17 g Oral Daily Fabi Aguirre NP   17 g at 06/24/24 1128    senna-docusate (SENOKOT-S/PERICOLACE) 8.6-50 MG per tablet 1-2 tablet  1-2 tablet Oral BID Fabi Aguirre NP   2 tablet at 06/24/24 1128    sodium chloride (PF) 0.9% PF flush 3 mL  3 mL Intracatheter Q8H Marlee Chinchilla MD   3 mL at 06/24/24 1129    sodium zirconium cyclosilicate (LOKELMA) packet 10 g  10 g Oral TID Marlee Chinchilla MD   10 g at 06/24/24 0811    Followed by    [START ON 6/25/2024] sodium zirconium cyclosilicate (LOKELMA) packet 10 g  10 g Oral Daily Marlee Chinchilla MD        tacrolimus (GENERIC EQUIVALENT)  capsule 2 mg  2 mg Oral BID IS Fabi Aguirre NP   2 mg at 24 0811    ursodiol (ACTIGALL) capsule 300 mg  300 mg Oral BID Fabi Aguirre NP   300 mg at 24 0811     Current Facility-Administered Medications   Medication Dose Route Frequency Provider Last Rate Last Admin       Physical Exam   Temp  Av.1  F (36.7  C)  Min: 97.5  F (36.4  C)  Max: 98.4  F (36.9  C)      Pulse  Av.8  Min: 72  Max: 91 Resp  Av.2  Min: 17  Max: 18  SpO2  Av.6 %  Min: 98 %  Max: 100 %     /88 (BP Location: Left arm)   Pulse 91   Temp 98.7  F (37.1  C) (Oral)   Resp 16   Wt 81.7 kg (180 lb 1.6 oz)   SpO2 95%   BMI 26.98 kg/m     Date 24 0700 - 24 0659   Shift 0670-8995 6273-9093 7527-2208 24 Hour Total   INTAKE   Shift Total       OUTPUT   Urine 260   260   Shift Total 260   260   Weight (kg)          Admit       GENERAL APPEARANCE: alert and no distress  HENT: mouth without ulcers or lesions  RESP: lungs clear to auscultation - no rales, rhonchi or wheezes  CV: regular rhythm, normal rate, no rub, no murmur  EDEMA: no LE edema bilaterally  ABDOMEN: soft, nondistended, nontender, bowel sounds normal  MS: extremities normal - no gross deformities noted, no evidence of inflammation in joints, no muscle tenderness  SKIN: no rash  Neuro: confused    Data   CMP  Recent Labs   Lab 24  0814 24  0624 24  0157 24  2131 24  0659 24  0544 24  0222 24  0213 24  0100 24  2305 24  2109 24  1013   NA  --  137  --   --   --  138  --   --   --   --  137 131*   POTASSIUM  --  4.3  --   --   --  5.0  5.0  --  5.5* 6.2*  --  6.2* 7.3*   CHLORIDE  --  103  --   --   --  104  --   --   --   --  108* 103   CO2  --  24  --   --   --  22  --   --   --   --  17* 21*   ANIONGAP  --  10  --   --   --  12  --   --   --   --  12 7   * 137* 138* 177*   < > 169*   < >  --   --    < > 219* 261*   BUN  --  36.3*  --   --   --   41.7*  --   --   --   --  42.8* 46.2*   CR  --  1.42*  --   --   --  1.75*  --   --   --   --  1.92* 2.15*   GFRESTIMATED  --  59*  --   --   --  46*  --   --   --   --  41* 36*   MEG  --  9.8  --   --   --  9.8  --   --   --   --  9.7 10.5*   MAG  --  1.3*  --   --   --   --   --   --   --   --   --  1.6*   PHOS  --  3.5  --   --   --   --   --   --   --   --   --  4.8*   PROTTOTAL  --  6.9  --   --   --  6.4  --   --   --   --   --  7.5   ALBUMIN  --  4.1  --   --   --  3.9  --   --   --   --   --  4.4   BILITOTAL  --  0.5  --   --   --  0.4  --   --   --   --   --  0.5   ALKPHOS  --  86  --   --   --  82  --   --   --   --   --  91   AST  --  26  --   --   --  26  --   --   --   --   --  27   ALT  --  18  --   --   --  19  --   --   --   --   --  27    < > = values in this interval not displayed.     CBC  Recent Labs   Lab 06/24/24  0624 06/21/24  1013   HGB 12.4* 13.9   WBC 6.3 6.8   RBC 3.97* 4.59   HCT 35.4* 42.0   MCV 89 92   MCH 31.2 30.3   MCHC 35.0 33.1   RDW 12.9 13.0   * 170     INRNo lab results found in last 7 days.  ABGNo lab results found in last 7 days.   Urine Studies  Recent Labs   Lab Test 06/21/24  1013 03/30/24  2248 03/04/24  1042 02/23/24  1558 02/15/24  2015 11/09/23  1147   COLOR Yellow Yellow Yellow Yellow   < > Yellow   APPEARANCE Clear Clear Clear Clear   < > Clear   URINEGLC 500* Negative 500* Negative   < > >=1000*   URINEBILI Small* Negative Negative Small*   < > Small*   URINEKETONE Negative Negative Negative Negative   < > Negative   SG 1.025 1.021 1.007 1.009   < > <=1.005   UBLD Negative Negative Negative Negative   < > Negative   URINEPH 5.0 5.0 5.0 5.0   < > 6.0   PROTEIN Negative Negative Negative Negative   < > Negative   UROBILINOGEN 0.2  --   --   --   --  2.0*   NITRITE Negative Negative Negative Negative   < > Negative   LEUKEST Negative Negative Negative Negative   < > Negative   RBCU 0-2 0 <1 <1   < > 0-2   WBCU 0-5 1 <1 <1   < > 0-5    < > = values in this  interval not displayed.     No lab results found.  PTH  No lab results found.  Iron Studies  Recent Labs   Lab Test 12/19/23  0758 08/23/23  1018 08/09/23  1122 07/15/23  1048   IRON 135 120 118 92   KE 2,948* 4,261* 4,611*  --        IMAGING:  All imaging studies reviewed by me.

## 2024-06-24 NOTE — CONSULTS
Care Management Initial Consult    General Information  Assessment completed with: Patient, Spouse or significant other,  (Sergio)  Type of CM/SW Visit: Initial Assessment    Primary Care Provider verified and updated as needed: Yes   Readmission within the last 30 days: no previous admission in last 30 days         Advance Care Planning: Advance Care Planning Reviewed: present on chart          Communication Assessment  Patient's communication style: spoken language (English or Bilingual)             Cognitive  Cognitive/Neuro/Behavioral: .WDL except, orientation  Level of Consciousness: confused  Arousal Level: opens eyes spontaneously  Orientation: disoriented to, place, time, situation  Mood/Behavior: calm  Best Language: 0 - No aphasia  Speech: spontaneous, illogical    Living Environment:   People in home: spouse   (Adina)  Current living Arrangements: town home      Able to return to prior arrangements: yes       Family/Social Support:  Care provided by: self, spouse/significant other  Provides care for: no one  Marital Status:   Wife, Parent(s), Other (specify)   (Adina)       Description of Support System: Supportive, Involved    Support Assessment: Adequate family and caregiver support, Adequate social supports    Current Resources:   Patient receiving home care services: No     Community Resources: None  Equipment currently used at home: none  Supplies currently used at home:      Employment/Financial:  Employment Status: disabled (Receiving long-term disability benefits through [employer] [employer] - OP transplant coordinator has been completing paperwork)        Financial Concerns: none   Referral to Financial Worker: No       Does the patient's insurance plan have a 3 day qualifying hospital stay waiver?  No    Lifestyle & Psychosocial Needs:  Social Determinants of Health     Food Insecurity: No Food Insecurity (6/22/2024)    Received from [facility]    Hunger Vital Sign      Worried About Running Out of Food in the Last Year: Never true     Ran Out of Food in the Last Year: Never true   Depression: Not at risk (3/22/2024)    PHQ-2     PHQ-2 Score: 2   Housing Stability: Low Risk  (6/22/2024)    Received from [employer] [facility]    Housing Stability Vital Sign     Unable to Pay for Housing in the Last Year: No     Number of Times Moved in the Last Year: 0     Homeless in the Last Year: No   Tobacco Use: High Risk (6/22/2024)    Received from [facility]    Patient History     Smoking Tobacco Use: Former     Smokeless Tobacco Use: Current     Passive Exposure: Not on file   Financial Resource Strain: Low Risk  (12/22/2023)    Financial Resource Strain     Within the past 12 months, have you or your family members you live with been unable to get utilities (heat, electricity) when it was really needed?: No   Alcohol Use: Not on file   Transportation Needs: No Transportation Needs (6/22/2024)    Received from [facility]    PRAPARE - Transportation     Lack of Transportation (Medical): No     Lack of Transportation (Non-Medical): No   Physical Activity: Not on file   Interpersonal Safety: Low Risk  (12/22/2023)    Interpersonal Safety     Do you feel physically and emotionally safe where you currently live?: Yes     Within the past 12 months, have you been hit, slapped, kicked or otherwise physically hurt by someone?: No     Within the past 12 months, have you been humiliated or emotionally abused in other ways by your partner or ex-partner?: No   Stress: Not on file   Social Connections: Not on file   Health Literacy: Not on file       Functional Status:  Prior to admission patient needed assistance:   Dependent ADLs:: Bathing, Dressing     Assesssment of Functional Status: Not at baseline with mobility, Not at  functional baseline    Mental Health Status:  Mental Health Status: No Current Concerns (No mental health history)       Chemical Dependency Status:  Chemical  Dependency Status: Past Concern  Chemical Dependency Management: Other (see comment) (Individual therapy)          Values/Beliefs:  Spiritual, Cultural Beliefs, Jewish Practices, Values that affect care:                 Additional Information:  I met with Mary and his spouse, Adina at bedside. Mary was disoriented and not tracking conversation. Information was obtained by his spouse Adina. Mary continues to live in a [facility] with his spouse, Adina. Prior to admission he was not receiving home care. Mary is utilizing long term disability through his employer and receives SSDI. Adina inquired about a letter of extension for his work considering his hospitalization. Message sent to Lore vickers Adina reported that she has been in contact with his employer re: paperwork. Adina also inquired if surgery will want to follow him longer outpatient as he has an appointment with Dr. Muniz on 7/2. I advised her to keep that appointment and the inpatient team will discuss any further appointments at discharge. No other questions concerns at this time.     Transplant  will follow for social work needs/ARU/TCU if recommended    CAPRI Ballard, [facility]  Liver Transplant   [facility]  Phone: [phone]

## 2024-06-24 NOTE — PLAN OF CARE
Goal Outcome Evaluation:      Plan of Care Reviewed With: patient    Overall Patient Progress: improvingOverall Patient Progress: improving         Pt admitted on 6/22 for hyperkalemia and AMS. Pt alert to self, and confusion on time, place and situation. Pt denied any pain. Pt calm and cooperative. Attendant at bedside for safety. Pt had a poor appetite. Pt up ad amy. Continue with plan of care.

## 2024-06-24 NOTE — PLAN OF CARE
Goal Outcome Evaluation:      Plan of Care Reviewed With: patient    Overall Patient Progress: no changeOverall Patient Progress: no change         Pt was admitted on 6/12 for hyponatremia, leaking ileostomy and ANGEL. Pt slept most of the evening. Pt A&Ox4. Pt received her pentamidine NEB. Pt had two ostomies and a TIMOTHY drain. The TIMOTHY drain was irrigated with 10cc of NS. NS infusing at 100 m/hr via PICC in the right arm. Pt had compression stockings on. Pt able to make her needs known. Continue with plan of care.

## 2024-06-25 ENCOUNTER — PATIENT OUTREACH (OUTPATIENT)
Dept: CARE COORDINATION | Facility: CLINIC | Age: 54
End: 2024-06-25

## 2024-06-25 LAB
ALBUMIN SERPL BCG-MCNC: 4 G/DL (ref 3.5–5.2)
ALP SERPL-CCNC: 84 U/L (ref 40–150)
ALT SERPL W P-5'-P-CCNC: 17 U/L (ref 0–70)
ANION GAP SERPL CALCULATED.3IONS-SCNC: 12 MMOL/L (ref 7–15)
AST SERPL W P-5'-P-CCNC: 25 U/L (ref 0–45)
BILIRUB DIRECT SERPL-MCNC: <0.2 MG/DL (ref 0–0.3)
BILIRUB SERPL-MCNC: 0.5 MG/DL
BUN SERPL-MCNC: 28.7 MG/DL (ref 6–20)
CALCIUM SERPL-MCNC: 9.7 MG/DL (ref 8.6–10)
CHLORIDE SERPL-SCNC: 103 MMOL/L (ref 98–107)
CREAT SERPL-MCNC: 1.21 MG/DL (ref 0.67–1.17)
DEPRECATED HCO3 PLAS-SCNC: 23 MMOL/L (ref 22–29)
EGFRCR SERPLBLD CKD-EPI 2021: 72 ML/MIN/1.73M2
ERYTHROCYTE [DISTWIDTH] IN BLOOD BY AUTOMATED COUNT: 12.8 % (ref 10–15)
GLUCOSE BLDC GLUCOMTR-MCNC: 139 MG/DL (ref 70–99)
GLUCOSE BLDC GLUCOMTR-MCNC: 144 MG/DL (ref 70–99)
GLUCOSE BLDC GLUCOMTR-MCNC: 163 MG/DL (ref 70–99)
GLUCOSE BLDC GLUCOMTR-MCNC: 165 MG/DL (ref 70–99)
GLUCOSE SERPL-MCNC: 105 MG/DL (ref 70–99)
HCT VFR BLD AUTO: 34.2 % (ref 40–53)
HGB BLD-MCNC: 12.3 G/DL (ref 13.3–17.7)
MAGNESIUM SERPL-MCNC: 1.6 MG/DL (ref 1.7–2.3)
MCH RBC QN AUTO: 31.1 PG (ref 26.5–33)
MCHC RBC AUTO-ENTMCNC: 36 G/DL (ref 31.5–36.5)
MCV RBC AUTO: 87 FL (ref 78–100)
PHOSPHATE SERPL-MCNC: 3.2 MG/DL (ref 2.5–4.5)
PLATELET # BLD AUTO: 151 10E3/UL (ref 150–450)
POTASSIUM SERPL-SCNC: 3.8 MMOL/L (ref 3.4–5.3)
PROT SERPL-MCNC: 6.5 G/DL (ref 6.4–8.3)
RBC # BLD AUTO: 3.95 10E6/UL (ref 4.4–5.9)
SODIUM SERPL-SCNC: 138 MMOL/L (ref 135–145)
TACROLIMUS BLD-MCNC: 4.2 UG/L (ref 5–15)
TME LAST DOSE: ABNORMAL H
TME LAST DOSE: ABNORMAL H
WBC # BLD AUTO: 6.5 10E3/UL (ref 4–11)

## 2024-06-25 PROCEDURE — 09JK8ZZ INSPECTION OF NASAL MUCOSA AND SOFT TISSUE, VIA NATURAL OR ARTIFICIAL OPENING ENDOSCOPIC: ICD-10-PCS | Performed by: PHYSICIAN ASSISTANT

## 2024-06-25 PROCEDURE — 36415 COLL VENOUS BLD VENIPUNCTURE: CPT | Performed by: NURSE PRACTITIONER

## 2024-06-25 PROCEDURE — 120N000011 HC R&B TRANSPLANT UMMC

## 2024-06-25 PROCEDURE — 83735 ASSAY OF MAGNESIUM: CPT | Performed by: NURSE PRACTITIONER

## 2024-06-25 PROCEDURE — 84100 ASSAY OF PHOSPHORUS: CPT | Performed by: NURSE PRACTITIONER

## 2024-06-25 PROCEDURE — 250N000012 HC RX MED GY IP 250 OP 636 PS 637: Performed by: TRANSPLANT SURGERY

## 2024-06-25 PROCEDURE — 250N000012 HC RX MED GY IP 250 OP 636 PS 637: Performed by: NURSE PRACTITIONER

## 2024-06-25 PROCEDURE — 80197 ASSAY OF TACROLIMUS: CPT | Performed by: NURSE PRACTITIONER

## 2024-06-25 PROCEDURE — 82248 BILIRUBIN DIRECT: CPT | Performed by: NURSE PRACTITIONER

## 2024-06-25 PROCEDURE — 31575 DIAGNOSTIC LARYNGOSCOPY: CPT | Performed by: PHYSICIAN ASSISTANT

## 2024-06-25 PROCEDURE — 250N000013 HC RX MED GY IP 250 OP 250 PS 637: Performed by: TRANSPLANT SURGERY

## 2024-06-25 PROCEDURE — 250N000013 HC RX MED GY IP 250 OP 250 PS 637: Performed by: NURSE PRACTITIONER

## 2024-06-25 PROCEDURE — 80053 COMPREHEN METABOLIC PANEL: CPT | Performed by: NURSE PRACTITIONER

## 2024-06-25 PROCEDURE — 99233 SBSQ HOSP IP/OBS HIGH 50: CPT | Performed by: INTERNAL MEDICINE

## 2024-06-25 PROCEDURE — 99253 IP/OBS CNSLTJ NEW/EST LOW 45: CPT | Mod: 25 | Performed by: PHYSICIAN ASSISTANT

## 2024-06-25 PROCEDURE — 85027 COMPLETE CBC AUTOMATED: CPT | Performed by: NURSE PRACTITIONER

## 2024-06-25 RX ORDER — PANTOPRAZOLE SODIUM 40 MG/1
40 TABLET, DELAYED RELEASE ORAL DAILY
Status: DISCONTINUED | OUTPATIENT
Start: 2024-06-25 | End: 2024-06-27 | Stop reason: HOSPADM

## 2024-06-25 RX ORDER — HALOPERIDOL 5 MG/ML
5 INJECTION INTRAMUSCULAR EVERY 8 HOURS PRN
Status: DISCONTINUED | OUTPATIENT
Start: 2024-06-25 | End: 2024-06-26

## 2024-06-25 RX ADMIN — PANTOPRAZOLE SODIUM 40 MG: 40 TABLET, DELAYED RELEASE ORAL at 10:34

## 2024-06-25 RX ADMIN — MAGNESIUM OXIDE TAB 400 MG (241.3 MG ELEMENTAL MG) 400 MG: 400 (241.3 MG) TAB at 07:47

## 2024-06-25 RX ADMIN — DOCUSATE SODIUM 50 MG AND SENNOSIDES 8.6 MG 1 TABLET: 8.6; 5 TABLET, FILM COATED ORAL at 07:47

## 2024-06-25 RX ADMIN — CYCLOSPORINE 275 MG: 100 CAPSULE, LIQUID FILLED ORAL at 19:05

## 2024-06-25 RX ADMIN — TACROLIMUS 5 MG: 5 CAPSULE ORAL at 07:47

## 2024-06-25 RX ADMIN — POLYETHYLENE GLYCOL 3350 17 G: 17 POWDER, FOR SOLUTION ORAL at 07:47

## 2024-06-25 RX ADMIN — DOCUSATE SODIUM 50 MG AND SENNOSIDES 8.6 MG 2 TABLET: 8.6; 5 TABLET, FILM COATED ORAL at 19:56

## 2024-06-25 RX ADMIN — INSULIN DEGLUDEC 13 UNITS: 100 INJECTION, SOLUTION SUBCUTANEOUS at 08:02

## 2024-06-25 RX ADMIN — QUETIAPINE FUMARATE 25 MG: 25 TABLET ORAL at 19:56

## 2024-06-25 RX ADMIN — SODIUM ZIRCONIUM CYCLOSILICATE 10 G: 10 POWDER, FOR SUSPENSION ORAL at 07:45

## 2024-06-25 RX ADMIN — MYCOPHENOLIC ACID 540 MG: 180 TABLET, DELAYED RELEASE ORAL at 07:47

## 2024-06-25 RX ADMIN — URSODIOL 300 MG: 300 CAPSULE ORAL at 07:47

## 2024-06-25 RX ADMIN — DAPSONE 50 MG: 25 TABLET ORAL at 07:47

## 2024-06-25 RX ADMIN — MYCOPHENOLIC ACID 540 MG: 180 TABLET, DELAYED RELEASE ORAL at 19:56

## 2024-06-25 RX ADMIN — URSODIOL 300 MG: 300 CAPSULE ORAL at 19:56

## 2024-06-25 ASSESSMENT — ACTIVITIES OF DAILY LIVING (ADL)
ADLS_ACUITY_SCORE: 26
ADLS_ACUITY_SCORE: 28
ADLS_ACUITY_SCORE: 26
ADLS_ACUITY_SCORE: 25
ADLS_ACUITY_SCORE: 26
ADLS_ACUITY_SCORE: 28
ADLS_ACUITY_SCORE: 26
ADLS_ACUITY_SCORE: 25
ADLS_ACUITY_SCORE: 28
ADLS_ACUITY_SCORE: 26
ADLS_ACUITY_SCORE: 28
ADLS_ACUITY_SCORE: 26
ADLS_ACUITY_SCORE: 26

## 2024-06-25 NOTE — PROGRESS NOTES
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  Transaminases stable. Followed by transplant surgery      # Immunosuppression: Tacrolimus immediate release (goal 8-10) and Mycophenolic acid (dose 540 mg every 12 hours)   - Patient is in an immunosuppressed state and will continue to monitor for efficacy and toxicity of immunosuppression medications.   - Changes: Not at this time    # Infection Prophylaxis:   - PJP: Dapsone- switched from bactrim due to critical hyperK (nl G6PD)  - CMV: Valganciclovir (Valcyte)    # Encephalopathy:   - MRI brain neg PRESS   - infectious w/up neg so far   - lower FK goals per transplant surgery, consider FK-->CSA switch if no improvement    # Hypertension: Controlled;  Goal BP: < 140/90 (Hospitalization goal)   - Changes: Not at this time continue fllorinef 0.1 mg MWF for hyperK    # Diabetes: Controlled (HbA1c <7%) Last HbA1c: 4.8%    # Anemia in Chronic Renal Disease: Hgb: Stable      MAITE: No     # HypoMg: replete    # Hyperkalemia:  resolved.  Continue Florinef 0.1g po MWF, reduce lokelma to 10 g prn for K>=5    # Transplant History:  Etiology of Kidney Failure: ANGEL  Tx: Liver Tx  Transplant: 3/5/2024 (Liver)  Crossmatch at time of Tx: negative  DSA at time of Tx: No  Significant changes in immunosuppression: None  Significant transplant-related complications: None    Recommendations were communicated to the primary team verbally.    Veronica Cabral MD  Pager:  "[phone]    REASON FOR CONSULT   Liver transplant, ANGEL    History of Present Illness     Mental status improving per wife but seemed to have intermittent confusion during evaluation today and appeared to have \"delusions\" talked about events that never happened   Afebrile.no headaches, nausea, vomiting  MRI brain unremarkable  UOP-950 ml /24h    Review of Systems    The 10 point Review of Systems is negative other than noted in the HPI or here.     Allergies   Allergies   Allergen Reactions    Other Food Allergy Anaphylaxis     Legumes (black beans, baked beans, chickpeas)    Pineapple Itching     Prior to Admission Medications   Current Facility-Administered Medications   Medication Dose Route Frequency Provider Last Rate Last Admin    cycloSPORINE modified (GENERIC EQUIVALENT) capsule 275 mg  275 mg Oral BID IS Ryder Cortez MD        dapsone (ACZONE) tablet 50 mg  50 mg Oral Daily Fabi Aguirre NP   50 mg at 06/25/24 0747    fludrocortisone (FLORINEF) tablet 0.1 mg  0.1 mg Oral Once per day on Monday Wednesday Friday aFbi Aguirre NP   0.1 mg at 06/24/24 0825    insulin aspart (NovoLOG) injection (RAPID ACTING)  1-7 Units Subcutaneous TID AC Fabi Aguirre NP   1 Units at 06/24/24 1742    insulin aspart (NovoLOG) injection (RAPID ACTING)  1-5 Units Subcutaneous At Bedtime Fabi Aguirre NP        insulin aspart (NovoLOG) injection (RAPID ACTING)   Subcutaneous TID w/meals Fabi Aguirre NP   2 Units at 06/25/24 1200    insulin degludec (TRESIBA) 100 UNIT/ML injection 13 Units  13 Units Subcutaneous QAM Fabi Aguirre NP   13 Units at 06/25/24 0802    magnesium oxide (MAG-OX) tablet 400 mg  400 mg Oral Daily Fabi Aguirre NP   400 mg at 06/25/24 0747    mycophenolic acid (GENERIC EQUIVALENT) EC tablet 540 mg  540 mg Oral BID Fabi Aguirre NP   540 mg at 06/25/24 0747    pantoprazole (PROTONIX) EC tablet 40 mg  40 mg Oral Daily Ryder Cortez, " MD   40 mg at 24 1034    polyethylene glycol (MIRALAX) Packet 17 g  17 g Oral Daily Fabi Aguirre NP   17 g at 24 0747    QUEtiapine (SEROquel) tablet 25 mg  25 mg Oral QPM Fabi Aguirre NP   25 mg at 24    senna-docusate (SENOKOT-S/PERICOLACE) 8.6-50 MG per tablet 1-2 tablet  1-2 tablet Oral BID Fabi Aguirre NP   1 tablet at 24 0747    sodium chloride (PF) 0.9% PF flush 3 mL  3 mL Intracatheter Q8H Marlee Chinchilla MD   3 mL at 24 1201    sodium zirconium cyclosilicate (LOKELMA) packet 10 g  10 g Oral Once per day on  Fabi Aguirre NP   10 g at 24 0745    ursodiol (ACTIGALL) capsule 300 mg  300 mg Oral BID Fabi Aguirre NP   300 mg at 24 0747     Current Facility-Administered Medications   Medication Dose Route Frequency Provider Last Rate Last Admin       Physical Exam   Temp  Av.1  F (36.7  C)  Min: 97.5  F (36.4  C)  Max: 98.4  F (36.9  C)      Pulse  Av.8  Min: 72  Max: 91 Resp  Av.2  Min: 17  Max: 18  SpO2  Av.6 %  Min: 98 %  Max: 100 %     /87 (BP Location: Right arm)   Pulse 87   Temp 97.7  F (36.5  C) (Oral)   Resp 18   Wt 81.7 kg (180 lb 1.6 oz)   SpO2 98%   BMI 26.98 kg/m     Date 24 07 - 24   Shift 5117-9642 8582-8657 5434-6499 24 Hour Total   INTAKE   Shift Total       OUTPUT   Urine 260   260   Shift Total 260   260   Weight (kg)          Admit       GENERAL APPEARANCE: alert and no distress  HENT: mouth without ulcers or lesions  RESP: lungs clear to auscultation - no rales, rhonchi or wheezes  CV: regular rhythm, normal rate, no rub, no murmur  EDEMA: no LE edema bilaterally  ABDOMEN: soft, nondistended, nontender, bowel sounds normal  MS: extremities normal - no gross deformities noted, no evidence of inflammation in joints, no muscle tenderness  SKIN: no rash  Neuro: confused    Data   CMP  Recent Labs   Lab 24  0759 24  0639  06/24/24  2220 06/24/24  1705 06/24/24  0814 06/24/24  0624 06/23/24  0659 06/23/24  0544 06/23/24  0222 06/23/24  0213 06/22/24  2305 06/22/24  2109 06/21/24  1013   NA  --  138  --   --   --  137  --  138  --   --   --  137 131*   POTASSIUM  --  3.8  --   --   --  4.3  --  5.0  5.0  --  5.5*   < > 6.2* 7.3*   CHLORIDE  --  103  --   --   --  103  --  104  --   --   --  108* 103   CO2  --  23  --   --   --  24  --  22  --   --   --  17* 21*   ANIONGAP  --  12  --   --   --  10  --  12  --   --   --  12 7   * 105* 154* 180*   < > 137*   < > 169*   < >  --    < > 219* 261*   BUN  --  28.7*  --   --   --  36.3*  --  41.7*  --   --   --  42.8* 46.2*   CR  --  1.21*  --   --   --  1.42*  --  1.75*  --   --   --  1.92* 2.15*   GFRESTIMATED  --  72  --   --   --  59*  --  46*  --   --   --  41* 36*   MEG  --  9.7  --   --   --  9.8  --  9.8  --   --   --  9.7 10.5*   MAG  --  1.6*  --   --   --  1.3*  --   --   --   --   --   --  1.6*   PHOS  --  3.2  --   --   --  3.5  --   --   --   --   --   --  4.8*   PROTTOTAL  --  6.5  --   --   --  6.9  --  6.4  --   --   --   --  7.5   ALBUMIN  --  4.0  --   --   --  4.1  --  3.9  --   --   --   --  4.4   BILITOTAL  --  0.5  --   --   --  0.5  --  0.4  --   --   --   --  0.5   ALKPHOS  --  84  --   --   --  86  --  82  --   --   --   --  91   AST  --  25  --   --   --  26  --  26  --   --   --   --  27   ALT  --  17  --   --   --  18  --  19  --   --   --   --  27    < > = values in this interval not displayed.     CBC  Recent Labs   Lab 06/25/24  0644 06/24/24  0624 06/21/24  1013   HGB 12.3* 12.4* 13.9   WBC 6.5 6.3 6.8   RBC 3.95* 3.97* 4.59   HCT 34.2* 35.4* 42.0   MCV 87 89 92   MCH 31.1 31.2 30.3   MCHC 36.0 35.0 33.1   RDW 12.8 12.9 13.0    149* 170     INRNo lab results found in last 7 days.  ABGNo lab results found in last 7 days.   Urine Studies  Recent Labs   Lab Test 06/24/24  1654 06/21/24  1013 03/30/24  2248 03/04/24  1042 02/15/24  2015  11/09/23  1147   COLOR Light Yellow Yellow Yellow Yellow   < > Yellow   APPEARANCE Clear Clear Clear Clear   < > Clear   URINEGLC >=1000* 500* Negative 500*   < > >=1000*   URINEBILI Negative Small* Negative Negative   < > Small*   URINEKETONE 10* Negative Negative Negative   < > Negative   SG 1.022 1.025 1.021 1.007   < > <=1.005   UBLD Negative Negative Negative Negative   < > Negative   URINEPH 5.5 5.0 5.0 5.0   < > 6.0   PROTEIN Negative Negative Negative Negative   < > Negative   UROBILINOGEN  --  0.2  --   --   --  2.0*   NITRITE Negative Negative Negative Negative   < > Negative   LEUKEST Negative Negative Negative Negative   < > Negative   RBCU 0 0-2 0 <1   < > 0-2   WBCU 0 0-5 1 <1   < > 0-5    < > = values in this interval not displayed.     No lab results found.  PTH  No lab results found.  Iron Studies  Recent Labs   Lab Test 12/19/23  0758 08/23/23  1018 08/09/23  1122 07/15/23  1048   IRON 135 120 118 92   KE 2,948* 4,261* 4,611*  --        IMAGING:  All imaging studies reviewed by me.

## 2024-06-25 NOTE — PLAN OF CARE
Vitals: /87 (BP Location: Right arm)   Pulse 87   Temp 97.7  F (36.5  C) (Oral)   Resp 18   Wt 81.7 kg (180 lb 1.6 oz)   SpO2 98%   BMI 26.98 kg/m      Endocrine: Glucose 139.  Labs: Stable labs, Magnesium 1.6.  Pain: Denies pain.  PRN's: n/a  Diet: 2 GM K+  LDA: PIV X2, saline loced.  GI: BM 6/25.  : Voids, not saving.  Skin: Skin is intact.  Neuro: Less confused, improving, some illogical thinking but was able to play cribbage and oriented to time.  Mobility: Up with minimal assist.  Education: n/a  Plan: Start Cyclosporin tonight. ENT consult.

## 2024-06-25 NOTE — PLAN OF CARE
/88 (BP Location: Left arm)   Pulse 102   Temp 97.9  F (36.6  C) (Oral)   Resp 16   Wt 81.7 kg (180 lb 1.6 oz)   SpO2 96%   BMI 26.98 kg/m      Status: AMS, Hyperkalemia  Activity: Up ad amy in room; steady on feet with attendant at bedside for safety.  Neuros: Disoriented to time and situation; attendant at bedside for safety.  Cardiac: WDL, denies chest pain.  Respiratory: WDL on RA, denies SOB.  GI/: +BS, LBM 6/22, voids spont with AUOP.  Diet: Tolerating 2gK diet.  Skin/Incisions: WDL.  Lines/Drains: PIV SL.  Pain/Nausea: Denies.  New Changes: No acute changes this shift.

## 2024-06-25 NOTE — PROGRESS NOTES
Clinical Product Navigator RN reviewed chart; patient on payer product coverage.  Review results:   CPN Initial Information Gathering  Referral Source: Health Plan    CC notified of admission via health plan report. Per chart review, Patient is admitted to [facility] from 6/22/2024 to Present with Acute confusion in setting of recent liver transplant, hyperkalemia.Patient is followed closely  by the Transplant team and Coordinator. No CC/CPN interventions planned at this time.   Not met any referral criteria at this time.  Will monitor for future needs    TRENT Diaz (she/her/hers)  Social Work Clinic Care Coordinator   [facility]  Sincere [email or web address]  [phone]

## 2024-06-25 NOTE — CONSULTS
Otolaryngology Consult Note  June 25, 2024      CC: incidental finding of asymmetric mucosal thickening in the nasopharynx    HPI: Mary Lopez is a 53 year old male with a past medical history of Laennec's cirrhosis s/p DDLT 3/5/24 c/b moderate-to-severe acute T cell mediated rejection, HTN, HLD, DM-2, pulmonary HTN, atrial fibrillation, CKD, ANGEL, and anemia of chronic disease. He is maintained on tacrolimus and MMF. He is now admitted with confusion, found to have hyperkalemia and tacrolimus toxicity. He had an MRI brain done due to his confusion and there was an incidental finding of asymmetric mucosal thickening in the nasopharynx. ENT was consulted for further evaluation. Patient denies any current sinonasal symptoms. No headaches, nasal congestions/drainage, or bleeding. He does have a history of nose bleeds in the past and did require nasal cautery at one time in October 2023. No recent epistaxis.     Past Medical History:   Diagnosis Date    ANGEL (acute kidney injury) (H24)     Alcoholic cirrhosis of liver without ascites (H) 07/13/2023    Alcoholic hepatitis with ascites (H28) 10/03/2023    Alcoholic hepatitis without ascites (H28) 07/13/2023    Closed fracture of one rib of left side 09/14/2023    Concussion without loss of consciousness 03/11/2020    Decompensated hepatic cirrhosis (H) 09/15/2023    Diabetes mellitus, type 2 (H) 11/22/2023    Essential hypertension 03/11/2020    Ganglion cyst of wrist, right 04/18/2024    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)     Liver replaced by transplant (H) 03/05/2024    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    ACR PRAJAPATI 6-7/9, treated with steroids    Mild hyperlipidemia 12/07/2021    Persistent insomnia 07/13/2023    Portal hypertension (H) 07/13/2023    Scrotal abscess     Secondary esophageal 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 (H) 07/13/2023    Tobacco abuse disorder 03/11/2020    Type 2 diabetes mellitus with hyperglycemia (H) 12/22/2023        Past Surgical History:   Procedure Laterality Date    BENCH LIVER  3/5/2024    Procedure: Bench liver;  Surgeon: Ryder Cortez MD;  Location: [facility] OR    CHOLECYSTECTOMY      COLONOSCOPY N/A 2024    Procedure: COLONOSCOPY, WITH POLYPECTOMY;  Surgeon: Jak Urbina MD;  Location: [facility]    CV RIGHT HEART CATH MEASUREMENTS RECORDED N/A 2024    Procedure: Right Heart Catheterization;  Surgeon: Alfred Tafoya MD;  Location:  HEART CARDIAC CATH LAB    IR LIVER BIOPSY PERCUTANEOUS  3/15/2024    IR RETROPERITONEAL ABSCESS DRAINAGE  2024    TONSILLECTOMY      TRANSPLANT LIVER RECIPIENT  DONOR N/A 3/5/2024    Procedure: Transplant liver recipient  donor, bile duct stent placement;  Surgeon: Ryder Cortez MD;  Location: [facility] OR    VASECTOMY         No current outpatient medications on file.          Allergies   Allergen Reactions    Other Food Allergy Anaphylaxis     Legumes (black beans, baked beans, chickpeas)    Pineapple Itching       Social History     Socioeconomic History    Marital status:      Spouse name: Not on file    Number of children: Not on file    Years of education: Not on file    Highest education level: Not on file   Occupational History    Occupation: Not working now   Tobacco Use    Smoking status: Former     Types: Cigarettes     Passive exposure: Never    Smokeless tobacco: Current     Types: Chew     Last attempt to quit: 2004    Tobacco comments:     Chew daily 1/8 of a tin per day   Vaping Use    Vaping status: Never Used   Substance and Sexual Activity    Alcohol use: Not Currently     Alcohol/week: 12.0 standard drinks of alcohol     Types: 12 Standard drinks or equivalent per week     Comment: Sober since 2023    Drug use: Not Currently    Sexual activity: Yes     Partners: Female     Birth control/protection: Male Surgical   Other Topics Concern    Parent/sibling w/ CABG, MI or angioplasty before 65F 55M? No   Social History  Narrative    Not on file     Social Determinants of Health     Financial Resource Strain: Low Risk  (12/22/2023)    Financial Resource Strain     Within the past 12 months, have you or your family members you live with been unable to get utilities (heat, electricity) when it was really needed?: No   Food Insecurity: No Food Insecurity (6/22/2024)    Received from [facility]    Hunger Vital Sign     Worried About Running Out of Food in the Last Year: Never true     Ran Out of Food in the Last Year: Never true   Transportation Needs: No Transportation Needs (6/22/2024)    Received from     [facility] - Transportation     Lack of Transportation (Medical): No     Lack of Transportation (Non-Medical): No   Physical Activity: Not on file   Stress: Not on file   Social Connections: Not on file   Interpersonal Safety: Low Risk  (12/22/2023)    Interpersonal Safety     Do you feel physically and emotionally safe where you currently live?: Yes     Within the past 12 months, have you been hit, slapped, kicked or otherwise physically hurt by someone?: No     Within the past 12 months, have you been humiliated or emotionally abused in other ways by your partner or ex-partner?: No   Housing Stability: Low Risk  (6/22/2024)    Received from     Housing Stability Vital Sign     Unable to Pay for Housing in the Last Year: No     Number of Times Moved in the Last Year: 0     Homeless in the Last Year: No       Family History   Problem Relation Age of Onset    Anxiety Disorder Mother     Depression Mother     Bipolar Disorder Mother     Chronic Obstructive Pulmonary Disease Mother     Lung Cancer Mother 81    Morbid Obesity Father     Diabetes Father     Diabetes Type 2  Brother     Substance Abuse Maternal Grandfather     Substance Abuse Paternal Grandfather     Colon Cancer No family hx of     Liver Disease No family hx of        ROS: 12 point review of systems is negative unless noted in  HPI.    PHYSICAL EXAM:  General: sitting up in bed, no acute distress  /85 (BP Location: Right arm)   Pulse 98   Temp 98.1  F (36.7  C) (Oral)   Resp 18   Wt 81.7 kg (180 lb 1.6 oz)   SpO2 97%   BMI 26.98 kg/m    HEAD: normocephalic, atraumatic  Face: symmetrical, CN VII intact bilaterally (HB 1), no swelling, edema, or erythema. Sensation V1-V3 intact and equal bilaterally.   Eyes: EOMI without spontaneous or gaze evoked nystagmus, clear sclera  Ears: external auricles appear normal  Nose: no anterior drainage, intact and midline septum without perforation or hematoma   Mouth: moist, no ulcers, no jaw or tooth tenderness, tongue midline and symmetric, mandibular simran present  Oropharynx: tonsils within normal limits, uvula midline, no oropharyngeal erythema  Neck: no LAD, trachea midline  Neuro: cranial nerves 2-12 grossly intact  Respiratory: breathing non-labored on RA, no stridor  Skin: no rashes or skin lesions of the face/neck  Psych: pleasant affect  Cardio: extremities warm and well perfused     FIBEROPTIC ENDOSCOPY:  Due to MRI findings in the nasopharynx, fiberoptic laryngoscopy was indicated. After obtaining verbal consent, the nose was topically anesthetized. The fiberoptic laryngoscope was passed under endoscopic vision through the left nasal passage. The turbinates were normal. The inferior and middle meati were clear without purulence, masses, or polyps. There is a small but well circumscribed asymmetric fullness/bulge on the right posterior nasopharyngeal wall, overlying mucosa appears normal without erythema or other mucosal changes. The eustachian tubes were clear. The soft palate appeared normal with good mobility. The epiglottis was sharp, and the visualized portion of the vallecula was clear. The larynx was clear with mobile cords. The arytenoids were clear, and there was no pooling in the hypopharynx.    ROUTINE IP LABS (Last four results)  BMP  Recent Labs   Lab 06/25/24  7985  06/25/24  0644 06/24/24  2220 06/24/24  1705 06/24/24  0814 06/24/24  0624 06/23/24  0659 06/23/24  0544 06/23/24  0222 06/23/24  0213 06/22/24  2305 06/22/24  2109   NA  --  138  --   --   --  137  --  138  --   --   --  137   POTASSIUM  --  3.8  --   --   --  4.3  --  5.0  5.0  --  5.5*   < > 6.2*   CHLORIDE  --  103  --   --   --  103  --  104  --   --   --  108*   MEG  --  9.7  --   --   --  9.8  --  9.8  --   --   --  9.7   CO2  --  23  --   --   --  24  --  22  --   --   --  17*   BUN  --  28.7*  --   --   --  36.3*  --  41.7*  --   --   --  42.8*   CR  --  1.21*  --   --   --  1.42*  --  1.75*  --   --   --  1.92*   * 105* 154* 180*   < > 137*   < > 169*   < >  --    < > 219*    < > = values in this interval not displayed.     CBC  Recent Labs   Lab 06/25/24  0644 06/24/24  0624 06/21/24  1013   WBC 6.5 6.3 6.8   RBC 3.95* 3.97* 4.59   HGB 12.3* 12.4* 13.9   HCT 34.2* 35.4* 42.0   MCV 87 89 92   MCH 31.1 31.2 30.3   MCHC 36.0 35.0 33.1   RDW 12.8 12.9 13.0    149* 170     INRNo lab results found in last 7 days.    Imaging:  Results for orders placed or performed during the hospital encounter of 06/22/24   MR Brain w/o & w Contrast    Narrative    MR BRAIN W/O & W CONTRAST 6/24/2024 10:02 AM    Provided History: Confusion in the setting of high tac levels; concern  for PRES/other etiologies    Comparison:  none available      Technique: Sagittal T1-weighted, coronal T2-weighted, axial T2 FLAIR,  axial susceptibility weighted, and axial diffusion-weighted with ADC  map images of the brain were obtained without intravenous contrast.  Following intravenous gadolinium-based contrast administration, axial  T2-weighted, diffusion, and T1-weighted images (in multiple planes)  were obtained.    Findings:     No mass effect, midline shift or evidence of intracranial hemorrhage.  There is a moderate diffuse cerebral parenchymal volume loss with  expansion of the supratentorial ventricles and cerebral  sulci. Small  faint areas of abnormal T2 hyperintense signal within the right and  left occipital lobe (series 5, images 11 and 12). No abnormal  intracranial enhancement. The globes, orbits, paranasal sinuses and  mastoid air cells are clear.    Right asymmetric mucosal thickening at the nasopharynx (series 6 image  4). Direct visualization is recommended.      Impression    Impression:   1. No acute intracranial pathology.  2. No imaging finding to suggest PRES or explain patient's confusion.  Faint, T2 hyperintense signal within the periventricular white matter  of the occipital lobes is nonspecific.    3. Right asymmetric mucosal thickening at the nasopharynx. Direct  visualization is recommended.    I have personally reviewed the examination and initial interpretation  and I agree with the findings.    REINA HWANG MD         SYSTEM ID:  [id number]         Assessment and Plan  Mary Lopez is a 53 year old male admitted with confusion, found to have hyperkalemia and tacrolimus toxicity. He had an MRI brain done due to his confusion and there was an incidental finding of asymmetric mucosal thickening in the nasopharynx. Findings appear benign in nature with no overlying mucosal changes, and are consistent with a Throwaldt's cyst vs mucus retention cyst.      - no acute ENT intervention indicated at this time as he is asymptomatic  - Follow up in ENT clinic PRN if he develops nasal congestion, headaches (most commonly occipital), middle ear effusion, halitosis, persistent nasal drainage, or infectious signs/symptoms  - remainder of care per primary team    Clinically Significant Risk Factors            # Hypomagnesemia: Lowest Mg = 1.3 mg/dL in last 2 days, will replace as needed       # Hypertension: Noted on problem list             # DMII: A1C = 7.3 % (Ref range: 0.0 - 5.6 %) within past 6 months, PRESENT ON ADMISSION  # Overweight: Estimated body mass index is 26.98 kg/m  as calculated from the  "following:    Height as of 6/21/24: 1.74 m (5' 8.5\").    Weight as of this encounter: 81.7 kg (180 lb 1.6 oz)., PRESENT ON ADMISSION     # Financial/Environmental Concerns: none         -- Patient and above plan to be discussed with Dr. Jordan.    Eugenia Delgado PA-C  Otolaryngology-Head & Neck Surgery  Please page ENT with questions by dialing [phone] and entering job [phone] when prompted.    "

## 2024-06-25 NOTE — PROGRESS NOTES
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 He was visiting [place] for a wedding where he was found to be confused. Presented to OSH and was found to have hyperkalemia (K 7.3). Transferred to [facility] for further management.     Hx DDLT 3/5/24: POD#112. LFTs WNL.     Immunosuppressed status secondary to medications:  Maintenance:   - Myfortic 540 mg BID   - Tacrolimus initially held d/t tacrolimus toxicity. Switch to cyclosporine with goal 150-220    Neuro:  AMS: Likely multifactorial r/t tacrolimus toxicity, insomnia.    - Tacrolimus initially held, now restarted at lower dose.    - MRI brain with no e/o PRES, no acute intracranial pathology. Nothing to explain encephalopathy.    - Restart PTA Seroquel.    - Delirium precautions.   - Infectious workup negative to date.     HEENT:   Nasal thickening: Seen on MRI. ENT consulted, will come scope at bedside    Hematology:   Anemia of chronic disease: Hgb ~12, stable.    Cardiorespiratory: No issues.    GI/Nutrition: 2 grams K diet.  Constipation: BM 6/24, continue bowel regimen.      Endocrine:   DM2: PTA on degludec, correction scale + fixed meal humalog.   - Restarted degludec at 1/2 dose with confusion   - Started sliding scale Novolog, carb coverage while inpatient.     Fluid/Electrolytes/Neph:   Hypomagnesemia: Mg 1.6.    - Mag-Ox 400 mg daily.   Hyperkalemia:  likely r/t ANGEL. Resolved. Plan:   - Florinef 0.1 mg MWF.    - Lokelma 10 grams T/Th/S   - Bactrim changed to dapsone (G6PD WNL).    - 2 gram potassium diet  ANGEL: Likely r/t elevated tacrolimus levels. Cr 1.21, trending down. Baseline Cr 0.7-0.9.    - Transplant nephrology consulted, appreciate recommendations.     Infectious disease: No issues.     MSK:  Rotator cuff injury: PTA on Tramadol. Patient encouraged to use PRN Tylenol until AMS resolves.     Prophylaxis: DVT (mechanical), fall, pneumocystis (Stop Bactrim d/t hyperkalemia, changed to dapsone, G6PD WNL)    Disposition: 7A, plan for discharge tomorrow if confusion resolves.      JOSELYN/Fellow/Resident Provider: Lilliam Hernandez MD     Faculty: Joanna Goldman M.D.  __________________________________________________________________  Transplant History:    3/5/2024 (Liver), Postoperative day: 112     Interval History: History is obtained from the patient, spouse  Overnight events: No acute events overnight. Confusion improving overall. Spouse reports last BM was on Thursday.     ROS:   A 10-point review of systems was negative except as noted above.    Curent Meds:  Current Facility-Administered Medications   Medication Dose Route Frequency Provider Last Rate Last Admin    cycloSPORINE modified (GENERIC EQUIVALENT) capsule 275 mg  275 mg Oral BID IS Ryder Cortez MD        dapsone (ACZONE) tablet 50 mg  50 mg Oral Daily Fabi Aguirre NP   50 mg at 06/25/24 0747    fludrocortisone (FLORINEF) tablet 0.1 mg  0.1 mg Oral Once per day on Monday Wednesday Friday Fabi Aguirre NP   0.1 mg at 06/24/24 0825    insulin aspart (NovoLOG) injection (RAPID ACTING)  1-7 Units Subcutaneous TID AC Fabi Aguirre NP   1 Units at 06/24/24 1742    insulin aspart (NovoLOG) injection (RAPID ACTING)  1-5 Units Subcutaneous At Bedtime Fabi Aguirre, NP        insulin aspart (NovoLOG) injection (RAPID ACTING)   Subcutaneous TID w/meals Timothy  Fabi Ya NP   2 Units at 06/25/24 1200    insulin degludec (TRESIBA) 100 UNIT/ML injection 13 Units  13 Units Subcutaneous QAM Fabi Aguirre NP   13 Units at 06/25/24 0802    magnesium oxide (MAG-OX) tablet 400 mg  400 mg Oral Daily Fabi Aguirre NP   400 mg at 06/25/24 0747    mycophenolic acid (GENERIC EQUIVALENT) EC tablet 540 mg  540 mg Oral BID Fabi Aguirre NP   540 mg at 06/25/24 0747    pantoprazole (PROTONIX) EC tablet 40 mg  40 mg Oral Daily Ryder Cortez MD   40 mg at 06/25/24 1034    polyethylene glycol (MIRALAX) Packet 17 g  17 g Oral Daily Fabi Aguirre NP   17 g at 06/25/24 0747    QUEtiapine (SEROquel) tablet 25 mg  25 mg Oral QPM Fabi Aguirre NP   25 mg at 06/24/24 2001    senna-docusate (SENOKOT-S/PERICOLACE) 8.6-50 MG per tablet 1-2 tablet  1-2 tablet Oral BID Fabi Aguirre NP   1 tablet at 06/25/24 0747    sodium chloride (PF) 0.9% PF flush 3 mL  3 mL Intracatheter Q8H Marlee Chinchilla MD   3 mL at 06/25/24 1201    sodium zirconium cyclosilicate (LOKELMA) packet 10 g  10 g Oral Once per day on Tuesday Thursday Saturday Fabi Aguirre NP   10 g at 06/25/24 0745    ursodiol (ACTIGALL) capsule 300 mg  300 mg Oral BID Fabi Aguirre NP   300 mg at 06/25/24 0747       Physical Exam:     Admit Weight: 81.7 kg (180 lb 1.6 oz)    Current Vitals:   /85 (BP Location: Right arm)   Pulse 98   Temp 98.1  F (36.7  C) (Oral)   Resp 18   Wt 81.7 kg (180 lb 1.6 oz)   SpO2 97%   BMI 26.98 kg/m      Vital sign ranges:    Temp:  [97.7  F (36.5  C)-98.7  F (37.1  C)] 98.1  F (36.7  C)  Pulse:  [] 98  Resp:  [16-18] 18  BP: (104-116)/(78-88) 107/85  SpO2:  [90 %-99 %] 97 %    General Appearance: in no apparent distress. Less confused.   Skin: normal, warm, dry  Heart: perfused  Lungs: unlabored on RA  Abdomen: abdomen soft  :  Moore removed  Extremities: edema: none noted.  Neurologic: Alert, confused. Tremor absent..      Frailty Scores          12/19/2023   Frailty Scores   Final Score Frail   Final Score Number 4      Details                   Data:   CMP  Recent Labs   Lab 06/25/24  0759 06/25/24  0644 06/24/24  0814 06/24/24  0624   NA  --  138  --  137   POTASSIUM  --  3.8  --  4.3   CHLORIDE  --  103  --  103   CO2  --  23  --  24   * 105*   < > 137*   BUN  --  28.7*  --  36.3*   CR  --  1.21*  --  1.42*   GFRESTIMATED  --  72  --  59*   MEG  --  9.7  --  9.8   MAG  --  1.6*  --  1.3*   PHOS  --  3.2  --  3.5   ALBUMIN  --  4.0  --  4.1   BILITOTAL  --  0.5  --  0.5   ALKPHOS  --  84  --  86   AST  --  25  --  26   ALT  --  17  --  18    < > = values in this interval not displayed.     CBC  Recent Labs   Lab 06/25/24  0644 06/24/24  0624 06/21/24  1013   HGB 12.3* 12.4* 13.9   WBC 6.5 6.3 6.8    149* 170   A1C  --   --  7.3*   I have reviewed history, examined patient and discussed plan with the fellow/resident/JOSELYN.  I concur with the findings in this note.

## 2024-06-26 ENCOUNTER — APPOINTMENT (OUTPATIENT)
Dept: CT IMAGING | Facility: CLINIC | Age: 54
DRG: 640 | End: 2024-06-26
Attending: SURGERY
Payer: COMMERCIAL

## 2024-06-26 LAB
ALBUMIN SERPL BCG-MCNC: 3.9 G/DL (ref 3.5–5.2)
ALP SERPL-CCNC: 84 U/L (ref 40–150)
ALT SERPL W P-5'-P-CCNC: 18 U/L (ref 0–70)
ANION GAP SERPL CALCULATED.3IONS-SCNC: 12 MMOL/L (ref 7–15)
ANION GAP SERPL CALCULATED.3IONS-SCNC: 13 MMOL/L (ref 7–15)
AST SERPL W P-5'-P-CCNC: 29 U/L (ref 0–45)
ATRIAL RATE - MUSE: 130 BPM
BILIRUB DIRECT SERPL-MCNC: <0.2 MG/DL (ref 0–0.3)
BILIRUB SERPL-MCNC: 0.5 MG/DL
BUN SERPL-MCNC: 30.4 MG/DL (ref 6–20)
BUN SERPL-MCNC: 30.8 MG/DL (ref 6–20)
CALCIUM SERPL-MCNC: 9.4 MG/DL (ref 8.6–10)
CALCIUM SERPL-MCNC: 9.5 MG/DL (ref 8.6–10)
CHLORIDE SERPL-SCNC: 103 MMOL/L (ref 98–107)
CHLORIDE SERPL-SCNC: 103 MMOL/L (ref 98–107)
CREAT SERPL-MCNC: 1.32 MG/DL (ref 0.67–1.17)
CREAT SERPL-MCNC: 1.45 MG/DL (ref 0.67–1.17)
DEPRECATED HCO3 PLAS-SCNC: 21 MMOL/L (ref 22–29)
DEPRECATED HCO3 PLAS-SCNC: 22 MMOL/L (ref 22–29)
DIASTOLIC BLOOD PRESSURE - MUSE: NORMAL MMHG
EGFRCR SERPLBLD CKD-EPI 2021: 58 ML/MIN/1.73M2
EGFRCR SERPLBLD CKD-EPI 2021: 65 ML/MIN/1.73M2
ERYTHROCYTE [DISTWIDTH] IN BLOOD BY AUTOMATED COUNT: 12.7 % (ref 10–15)
GLUCOSE BLDC GLUCOMTR-MCNC: 177 MG/DL (ref 70–99)
GLUCOSE BLDC GLUCOMTR-MCNC: 183 MG/DL (ref 70–99)
GLUCOSE BLDC GLUCOMTR-MCNC: 196 MG/DL (ref 70–99)
GLUCOSE BLDC GLUCOMTR-MCNC: 212 MG/DL (ref 70–99)
GLUCOSE BLDC GLUCOMTR-MCNC: 252 MG/DL (ref 70–99)
GLUCOSE SERPL-MCNC: 198 MG/DL (ref 70–99)
GLUCOSE SERPL-MCNC: 209 MG/DL (ref 70–99)
HCT VFR BLD AUTO: 34.6 % (ref 40–53)
HGB BLD-MCNC: 11.9 G/DL (ref 13.3–17.7)
INTERPRETATION ECG - MUSE: NORMAL
MAGNESIUM SERPL-MCNC: 1.5 MG/DL (ref 1.7–2.3)
MCH RBC QN AUTO: 30.3 PG (ref 26.5–33)
MCHC RBC AUTO-ENTMCNC: 34.4 G/DL (ref 31.5–36.5)
MCV RBC AUTO: 88 FL (ref 78–100)
P AXIS - MUSE: 45 DEGREES
PHOSPHATE SERPL-MCNC: 3.4 MG/DL (ref 2.5–4.5)
PLATELET # BLD AUTO: 154 10E3/UL (ref 150–450)
POTASSIUM SERPL-SCNC: 3.7 MMOL/L (ref 3.4–5.3)
POTASSIUM SERPL-SCNC: 3.8 MMOL/L (ref 3.4–5.3)
PR INTERVAL - MUSE: 144 MS
PROT SERPL-MCNC: 6.4 G/DL (ref 6.4–8.3)
QRS DURATION - MUSE: 82 MS
QT - MUSE: 298 MS
QTC - MUSE: 438 MS
R AXIS - MUSE: -10 DEGREES
RBC # BLD AUTO: 3.93 10E6/UL (ref 4.4–5.9)
SODIUM SERPL-SCNC: 137 MMOL/L (ref 135–145)
SODIUM SERPL-SCNC: 137 MMOL/L (ref 135–145)
SYSTOLIC BLOOD PRESSURE - MUSE: NORMAL MMHG
T AXIS - MUSE: 63 DEGREES
VENTRICULAR RATE- MUSE: 130 BPM
WBC # BLD AUTO: 7.4 10E3/UL (ref 4–11)

## 2024-06-26 PROCEDURE — 93005 ELECTROCARDIOGRAM TRACING: CPT

## 2024-06-26 PROCEDURE — 120N000011 HC R&B TRANSPLANT UMMC

## 2024-06-26 PROCEDURE — 36415 COLL VENOUS BLD VENIPUNCTURE: CPT | Performed by: NURSE PRACTITIONER

## 2024-06-26 PROCEDURE — 250N000013 HC RX MED GY IP 250 OP 250 PS 637: Performed by: NURSE PRACTITIONER

## 2024-06-26 PROCEDURE — 82248 BILIRUBIN DIRECT: CPT | Performed by: NURSE PRACTITIONER

## 2024-06-26 PROCEDURE — 84100 ASSAY OF PHOSPHORUS: CPT | Performed by: NURSE PRACTITIONER

## 2024-06-26 PROCEDURE — 99223 1ST HOSP IP/OBS HIGH 75: CPT | Mod: GC | Performed by: STUDENT IN AN ORGANIZED HEALTH CARE EDUCATION/TRAINING PROGRAM

## 2024-06-26 PROCEDURE — 250N000012 HC RX MED GY IP 250 OP 636 PS 637: Performed by: TRANSPLANT SURGERY

## 2024-06-26 PROCEDURE — 70450 CT HEAD/BRAIN W/O DYE: CPT

## 2024-06-26 PROCEDURE — 70450 CT HEAD/BRAIN W/O DYE: CPT | Mod: 26 | Performed by: RADIOLOGY

## 2024-06-26 PROCEDURE — 250N000012 HC RX MED GY IP 250 OP 636 PS 637: Performed by: NURSE PRACTITIONER

## 2024-06-26 PROCEDURE — 250N000013 HC RX MED GY IP 250 OP 250 PS 637: Performed by: TRANSPLANT SURGERY

## 2024-06-26 PROCEDURE — 82310 ASSAY OF CALCIUM: CPT | Performed by: NURSE PRACTITIONER

## 2024-06-26 PROCEDURE — 36415 COLL VENOUS BLD VENIPUNCTURE: CPT

## 2024-06-26 PROCEDURE — 250N000013 HC RX MED GY IP 250 OP 250 PS 637: Performed by: PHYSICIAN ASSISTANT

## 2024-06-26 PROCEDURE — 99254 IP/OBS CNSLTJ NEW/EST MOD 60: CPT | Performed by: PSYCHIATRY & NEUROLOGY

## 2024-06-26 PROCEDURE — 93010 ELECTROCARDIOGRAM REPORT: CPT | Performed by: INTERNAL MEDICINE

## 2024-06-26 PROCEDURE — 999N000248 HC STATISTIC IV INSERT WITH US BY RN

## 2024-06-26 PROCEDURE — 250N000011 HC RX IP 250 OP 636: Mod: JZ

## 2024-06-26 PROCEDURE — 83735 ASSAY OF MAGNESIUM: CPT | Performed by: NURSE PRACTITIONER

## 2024-06-26 PROCEDURE — 80048 BASIC METABOLIC PNL TOTAL CA: CPT

## 2024-06-26 PROCEDURE — 85014 HEMATOCRIT: CPT | Performed by: NURSE PRACTITIONER

## 2024-06-26 PROCEDURE — 99233 SBSQ HOSP IP/OBS HIGH 50: CPT | Performed by: INTERNAL MEDICINE

## 2024-06-26 RX ORDER — HALOPERIDOL 5 MG/ML
5 INJECTION INTRAMUSCULAR
Status: DISCONTINUED | OUTPATIENT
Start: 2024-06-26 | End: 2024-06-26

## 2024-06-26 RX ORDER — QUETIAPINE FUMARATE 25 MG/1
25-50 TABLET, FILM COATED ORAL
Status: DISCONTINUED | OUTPATIENT
Start: 2024-06-26 | End: 2024-06-27 | Stop reason: HOSPADM

## 2024-06-26 RX ORDER — MAGNESIUM OXIDE 400 MG/1
800 TABLET ORAL DAILY
Status: DISCONTINUED | OUTPATIENT
Start: 2024-06-27 | End: 2024-06-27

## 2024-06-26 RX ORDER — HALOPERIDOL 5 MG/ML
2.5-5 INJECTION INTRAMUSCULAR EVERY 4 HOURS PRN
Status: DISCONTINUED | OUTPATIENT
Start: 2024-06-26 | End: 2024-06-27 | Stop reason: HOSPADM

## 2024-06-26 RX ADMIN — Medication 12.5 MG: at 18:54

## 2024-06-26 RX ADMIN — URSODIOL 300 MG: 300 CAPSULE ORAL at 07:57

## 2024-06-26 RX ADMIN — MYCOPHENOLIC ACID 540 MG: 180 TABLET, DELAYED RELEASE ORAL at 20:16

## 2024-06-26 RX ADMIN — Medication 5 MG: at 17:56

## 2024-06-26 RX ADMIN — CYCLOSPORINE 275 MG: 100 CAPSULE, LIQUID FILLED ORAL at 07:56

## 2024-06-26 RX ADMIN — URSODIOL 300 MG: 300 CAPSULE ORAL at 20:15

## 2024-06-26 RX ADMIN — PANTOPRAZOLE SODIUM 40 MG: 40 TABLET, DELAYED RELEASE ORAL at 07:57

## 2024-06-26 RX ADMIN — INSULIN DEGLUDEC 13 UNITS: 100 INJECTION, SOLUTION SUBCUTANEOUS at 08:11

## 2024-06-26 RX ADMIN — CYCLOSPORINE 275 MG: 100 CAPSULE, LIQUID FILLED ORAL at 17:55

## 2024-06-26 RX ADMIN — MYCOPHENOLIC ACID 540 MG: 180 TABLET, DELAYED RELEASE ORAL at 07:58

## 2024-06-26 RX ADMIN — FLUDROCORTISONE ACETATE 0.1 MG: 0.1 TABLET ORAL at 08:17

## 2024-06-26 RX ADMIN — EMPAGLIFLOZIN 25 MG: 25 TABLET, FILM COATED ORAL at 09:53

## 2024-06-26 RX ADMIN — MAGNESIUM OXIDE TAB 400 MG (241.3 MG ELEMENTAL MG) 400 MG: 400 (241.3 MG) TAB at 07:57

## 2024-06-26 RX ADMIN — DAPSONE 50 MG: 25 TABLET ORAL at 07:56

## 2024-06-26 RX ADMIN — HALOPERIDOL LACTATE 5 MG: 5 INJECTION, SOLUTION INTRAMUSCULAR at 00:11

## 2024-06-26 ASSESSMENT — ACTIVITIES OF DAILY LIVING (ADL)
ADLS_ACUITY_SCORE: 22
ADLS_ACUITY_SCORE: 25
ADLS_ACUITY_SCORE: 25
ADLS_ACUITY_SCORE: 22
ADLS_ACUITY_SCORE: 25
ADLS_ACUITY_SCORE: 22
ADLS_ACUITY_SCORE: 22
ADLS_ACUITY_SCORE: 25
ADLS_ACUITY_SCORE: 22
ADLS_ACUITY_SCORE: 25
ADLS_ACUITY_SCORE: 22
ADLS_ACUITY_SCORE: 23
ADLS_ACUITY_SCORE: 22
ADLS_ACUITY_SCORE: 25
ADLS_ACUITY_SCORE: 22
ADLS_ACUITY_SCORE: 22

## 2024-06-26 NOTE — PROGRESS NOTES
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  Transaminases stable. Followed by transplant surgery      # Immunosuppression: Tacrolimus immediate release (goal 8-10) and Mycophenolic acid (dose 540 mg every 12 hours)   - Patient is in an immunosuppressed state and will continue to monitor for efficacy and toxicity of immunosuppression medications.   - Changes: Not at this time    # Infection Prophylaxis:   - PJP: Dapsone- switched from bactrim due to critical hyperK (nl G6PD)  - CMV: Valganciclovir (Valcyte)    # Encephalopathy:   - MRI brain neg PRES   - infectious w/up neg so far   - lower FK goals per transplant surgery, consider FK-->CSA switch if no improvement    # Hypertension: Controlled;  Goal BP: < 140/90 (Hospitalization goal)   - Changes: Not at this time continue fllorinef 0.1 mg MWF for hyperK    # Diabetes: Controlled (HbA1c <7%) Last HbA1c: 4.8%    # Anemia in Chronic Renal Disease: Hgb: Stable      MIATE: No     # HypoMg: replete    # Hyperkalemia:  resolved.  Continue Florinef 0.1g po MWF, reduce dose to twice weekly if K remains<4 on recheck  reduce lokelma to 10 g prn for K>=5    # Transplant History:  Etiology of Kidney Failure: ANGEL  Tx: Liver Tx  Transplant: 3/5/2024 (Liver)  Crossmatch at time of Tx: negative  DSA at time of Tx: No  Significant changes in immunosuppression:  None  Significant transplant-related complications: None    Recommendations were communicated to the primary team verbally.    Veronica Cabral MD  Pager: [phone]    REASON FOR CONSULT   Liver transplant, ANGEL    History of Present Illness     Acute  agitation and confusion overnight, code 21 called, hit his head, CTH unremarkable  switched from FK to CSA  UOP-not accurately recorded  No fevers, nausea, vomiting    Review of Systems    The 10 point Review of Systems is negative other than noted in the HPI or here.     Allergies   Allergies   Allergen Reactions    Other Food Allergy Anaphylaxis     Legumes (black beans, baked beans, chickpeas)    Pineapple Itching     Prior to Admission Medications   Current Facility-Administered Medications   Medication Dose Route Frequency Provider Last Rate Last Admin    cycloSPORINE modified (GENERIC EQUIVALENT) capsule 275 mg  275 mg Oral BID IS Ryder Cortez MD   275 mg at 06/26/24 0756    dapsone (ACZONE) tablet 50 mg  50 mg Oral Daily Fabi Aguirre NP   50 mg at 06/26/24 0756    empagliflozin (JARDIANCE) tablet 25 mg  25 mg Oral Daily Eugenie Pascal APRN CNP   25 mg at 06/26/24 0953    fludrocortisone (FLORINEF) tablet 0.1 mg  0.1 mg Oral Once per day on Monday Wednesday Friday Faib Aguirre NP   0.1 mg at 06/26/24 0817    insulin aspart (NovoLOG) injection (RAPID ACTING)  1-7 Units Subcutaneous TID AC Fabi Aguirre NP   3 Units at 06/26/24 1142    insulin aspart (NovoLOG) injection (RAPID ACTING)  1-5 Units Subcutaneous At Bedtime Fabi Aguirre NP        insulin aspart (NovoLOG) injection (RAPID ACTING)   Subcutaneous TID w/meals Fabi Aguirre NP   3 Units at 06/25/24 1907    insulin degludec (TRESIBA) 100 UNIT/ML injection 13 Units  13 Units Subcutaneous QAM Fabi Aguirre NP   13 Units at 06/26/24 0811    magnesium oxide (MAG-OX) tablet 400 mg  400 mg Oral Daily Fabi Aguirre NP   400 mg at 06/26/24 0757     mycophenolic acid (GENERIC EQUIVALENT) EC tablet 540 mg  540 mg Oral BID Fabi Aguirre NP   540 mg at 24 0758    pantoprazole (PROTONIX) EC tablet 40 mg  40 mg Oral Daily Ryder Cortez MD   40 mg at 24 0757    polyethylene glycol (MIRALAX) Packet 17 g  17 g Oral Daily Fabi Aguirre NP   17 g at 24 0747    QUEtiapine (SEROquel) tablet 25 mg  25 mg Oral QPM Fabi Aguirre NP   25 mg at 24    senna-docusate (SENOKOT-S/PERICOLACE) 8.6-50 MG per tablet 1-2 tablet  1-2 tablet Oral BID Fabi Aguirre NP   2 tablet at 24    sodium chloride (PF) 0.9% PF flush 3 mL  3 mL Intracatheter Q8H Marlee Chinchilla MD   3 mL at 24 1147    sodium zirconium cyclosilicate (LOKELMA) packet 10 g  10 g Oral Once per day on  Fabi Aguirre NP   10 g at 24 0745    ursodiol (ACTIGALL) capsule 300 mg  300 mg Oral BID Fabi Aguirre NP   300 mg at 24 0757     Current Facility-Administered Medications   Medication Dose Route Frequency Provider Last Rate Last Admin       Physical Exam   Temp  Av.1  F (36.7  C)  Min: 97.5  F (36.4  C)  Max: 98.4  F (36.9  C)      Pulse  Av.8  Min: 72  Max: 91 Resp  Av.2  Min: 17  Max: 18  SpO2  Av.6 %  Min: 98 %  Max: 100 %     BP (!) 115/91 (BP Location: Right arm, Patient Position: Supine, Cuff Size: Adult Regular)   Pulse 108   Temp 98.8  F (37.1  C)   Resp 16   Wt 81.7 kg (180 lb 1.6 oz)   SpO2 98%   BMI 26.98 kg/m     Date 24 0700 - 24 0659   Shift 1694-1054 1222-5355 2573-3860 24 Hour Total   INTAKE   Shift Total       OUTPUT   Urine 260   260   Shift Total 260   260   Weight (kg)          Admit       GENERAL APPEARANCE: awake , not in distress confused  HENT: mouth without ulcers or lesions  RESP: lungs clear to auscultation - no rales, rhonchi or wheezes  CV: regular rhythm, normal rate, no rub, no murmur  EDEMA: no LE edema  bilaterally  ABDOMEN: soft, nondistended, nontender, bowel sounds normal  MS: extremities normal - no gross deformities noted, no evidence of inflammation in joints, no muscle tenderness  SKIN: no rash  Neuro: confused    Data   CMP  Recent Labs   Lab 06/26/24  1139 06/26/24  0749 06/26/24  0623 06/26/24  0158 06/26/24  0042 06/25/24  0759 06/25/24  0644 06/24/24  0814 06/24/24  0624 06/23/24  0659 06/23/24  0544 06/22/24  2109 06/21/24  1013   NA  --   --  137  --  137  --  138  --  137  --  138   < > 131*   POTASSIUM  --   --  3.8  --  3.7  --  3.8  --  4.3  --  5.0  5.0   < > 7.3*   CHLORIDE  --   --  103  --  103  --  103  --  103  --  104   < > 103   CO2  --   --  21*  --  22  --  23  --  24  --  22   < > 21*   ANIONGAP  --   --  13  --  12  --  12  --  10  --  12   < > 7   * 183* 198* 196* 209*   < > 105*   < > 137*   < > 169*   < > 261*   BUN  --   --  30.4*  --  30.8*  --  28.7*  --  36.3*  --  41.7*   < > 46.2*   CR  --   --  1.32*  --  1.45*  --  1.21*  --  1.42*  --  1.75*   < > 2.15*   GFRESTIMATED  --   --  65  --  58*  --  72  --  59*  --  46*   < > 36*   MEG  --   --  9.4  --  9.5  --  9.7  --  9.8  --  9.8   < > 10.5*   MAG  --   --  1.5*  --   --   --  1.6*  --  1.3*  --   --   --  1.6*   PHOS  --   --  3.4  --   --   --  3.2  --  3.5  --   --   --  4.8*   PROTTOTAL  --   --  6.4  --   --   --  6.5  --  6.9  --  6.4  --  7.5   ALBUMIN  --   --  3.9  --   --   --  4.0  --  4.1  --  3.9  --  4.4   BILITOTAL  --   --  0.5  --   --   --  0.5  --  0.5  --  0.4  --  0.5   ALKPHOS  --   --  84  --   --   --  84  --  86  --  82  --  91   AST  --   --  29  --   --   --  25  --  26  --  26  --  27   ALT  --   --  18  --   --   --  17  --  18  --  19  --  27    < > = values in this interval not displayed.     CBC  Recent Labs   Lab 06/26/24  0623 06/25/24  0644 06/24/24  0624 06/21/24  1013   HGB 11.9* 12.3* 12.4* 13.9   WBC 7.4 6.5 6.3 6.8   RBC 3.93* 3.95* 3.97* 4.59   HCT 34.6* 34.2* 35.4* 42.0    MCV 88 87 89 92   MCH 30.3 31.1 31.2 30.3   MCHC 34.4 36.0 35.0 33.1   RDW 12.7 12.8 12.9 13.0    151 149* 170     INRNo lab results found in last 7 days.  ABGNo lab results found in last 7 days.   Urine Studies  Recent Labs   Lab Test 06/24/24  1654 06/21/24  1013 03/30/24  2248 03/04/24  1042 02/15/24  2015 11/09/23  1147   COLOR Light Yellow Yellow Yellow Yellow   < > Yellow   APPEARANCE Clear Clear Clear Clear   < > Clear   URINEGLC >=1000* 500* Negative 500*   < > >=1000*   URINEBILI Negative Small* Negative Negative   < > Small*   URINEKETONE 10* Negative Negative Negative   < > Negative   SG 1.022 1.025 1.021 1.007   < > <=1.005   UBLD Negative Negative Negative Negative   < > Negative   URINEPH 5.5 5.0 5.0 5.0   < > 6.0   PROTEIN Negative Negative Negative Negative   < > Negative   UROBILINOGEN  --  0.2  --   --   --  2.0*   NITRITE Negative Negative Negative Negative   < > Negative   LEUKEST Negative Negative Negative Negative   < > Negative   RBCU 0 0-2 0 <1   < > 0-2   WBCU 0 0-5 1 <1   < > 0-5    < > = values in this interval not displayed.     No lab results found.  PTH  No lab results found.  Iron Studies  Recent Labs   Lab Test 12/19/23  0758 08/23/23  1018 08/09/23  1122 07/15/23  1048   IRON 135 120 118 92   KE 2,948* 4,261* 4,611*  --        IMAGING:  All imaging studies reviewed by me.

## 2024-06-26 NOTE — CODE/RAPID RESPONSE
[id number]  /88 (BP Location: Right arm)   Pulse 97   Temp 98.2  F (36.8  C) (Oral)   Resp 16   Wt 81.7 kg (180 lb 1.6 oz)   SpO2 98%   BMI 26.98 kg/m       VS- tachy low 100's on tele on Room Air .   Attendant at bedside, Restraints in place  BG- ACHS 196 at 0200  Labs- pending AM collection   Pain/Nausea/PRN'S- denies pain and nausea   Diet- low potassium  LDA- PIV x1 SL   Gtt/IVF- none   GI/- voiding, LBM 6/25  Skin- intact   Activity- Assist x1   Events- Code 21 Called by this writer, RN entered room to assess patient, RN went to flush patient's IV's and patient became aggravated, pulled 2 PIV's and got out of bed and began yelling and hitting window. Provider at bedside, ordered restraints, attendant and IM haldol. Patient ran into window head first, CT of head ordered and completed. Patient's wife arrived and is at bedside overnight. Patient is now more calm and restring. wrist restraints remain in place.New PIV placed.   Plan- Continue with plan of care.

## 2024-06-26 NOTE — PROGRESS NOTES
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 He was visiting [place] for a wedding where he was found to be confused. Presented to OSH and was found to have hyperkalemia (K 7.3). Transferred to [facility] for further management.     Hx DDLT 3/5/24: POD #113. LFTs WNL.     Immunosuppressed status secondary to medications:  Maintenance:   - Myfortic 540 mg BID   - Tacrolimus discontinued d/t tacrolimus toxicity.    - Cyclosporine with goal level 150-220 started 6/25    Neuro:  AMS: Likely multifactorial r/t tacrolimus toxicity, insomnia. Tacrolimus initially held, then discontinued on 6/25. MRI brain with no e/o PRES, no acute intracranial pathology. Nothing to explain encephalopathy. No sign of infection. Episode of acute confusion on violent behavior requiring restrains on 6/26. Pt attempted to jump out of hospital window and struck head on glass. Head CT negative. No memory of the episode this morning.   - Delirium precautions.   - Consult Neurology   - Consult Psychiatry    HEENT:   Nasal thickening: Seen on MRI. ENT consulted. Findings appear benign in nature with no overlying mucosal changes, and are consistent with a Throwaldt's cyst vs mucus retention cyst. Follow up in ENT clinic PRN if he develops nasal congestion, headaches (most commonly occipital), middle ear effusion, halitosis, persistent nasal drainage, or  infectious signs/symptoms.    Hematology:   Anemia of chronic disease: Hgb ~12, stable.    Cardiorespiratory: No issues.    GI/Nutrition: 2 grams K diet.  Constipation: BM 6/24, continue bowel regimen.      Endocrine:   DM2: PTA on degludec, correction scale + fixed meal humalog. -209.   - Continue degludec at 1/2 dose    - Continue sliding scale Novolog, carb coverage while inpatient.    - Restart empagliflozin today    Fluid/Electrolytes/Neph:   Hypomagnesemia: Mg 1.5.    - Mag-Ox 800 mg daily.   Hyperkalemia: likely r/t ANGEL and tacrolimus. Resolved. Plan:   - Florinef 0.1 mg MWF.    - Lokelma 10 grams T/Th/S   - Bactrim changed to dapsone (G6PD WNL).    - 2 gram potassium diet  ANGEL: Likely r/t elevated tacrolimus levels. Cr 1.3. Baseline Cr 0.7-0.9.    - Transplant nephrology consulted, appreciate recommendations.     Infectious disease: No issues.     MSK:  Rotator cuff injury: PTA on Tramadol. Patient encouraged to use PRN Tylenol until AMS resolves.     Prophylaxis: DVT (mechanical), fall, pneumocystis (Stop Bactrim d/t hyperkalemia, changed to dapsone, G6PD WNL)    Disposition: 7A    JOSELYN/Fellow/Resident Provider: Eugenie Pascal NP     Faculty: Joanna Goldman M.D.  __________________________________________________________________      Interval History: History is obtained from the patient, spouse  Overnight events: Episode of agitation and confusion overnight, see notes. No memory of incident today. Denies pain.     ROS:   A 10-point review of systems was negative except as noted above.    Curent Meds:  Current Facility-Administered Medications   Medication Dose Route Frequency Provider Last Rate Last Admin    cycloSPORINE modified (GENERIC EQUIVALENT) capsule 275 mg  275 mg Oral BID IS Ryder Cortez MD   275 mg at 06/26/24 5342    dapsone (ACZONE) tablet 50 mg  50 mg Oral Daily Fabi Aguirre NP   50 mg at 06/26/24 0756    empagliflozin (JARDIANCE) tablet 25 mg  25 mg Oral Daily Naida  LESA Cantrell CNP   25 mg at 06/26/24 0953    fludrocortisone (FLORINEF) tablet 0.1 mg  0.1 mg Oral Once per day on Monday Wednesday Friday Fabi Aguirre NP   0.1 mg at 06/26/24 0817    insulin aspart (NovoLOG) injection (RAPID ACTING)  1-7 Units Subcutaneous TID AC Fabi Aguirre NP   3 Units at 06/26/24 1142    insulin aspart (NovoLOG) injection (RAPID ACTING)  1-5 Units Subcutaneous At Bedtime Fabi Aguirre NP        insulin aspart (NovoLOG) injection (RAPID ACTING)   Subcutaneous TID w/meals Fabi Aguirre NP   3 Units at 06/25/24 1907    insulin degludec (TRESIBA) 100 UNIT/ML injection 13 Units  13 Units Subcutaneous QAM Fabi Aguirre NP   13 Units at 06/26/24 0811    magnesium oxide (MAG-OX) tablet 400 mg  400 mg Oral Daily Fabi Aguirre NP   400 mg at 06/26/24 0757    mycophenolic acid (GENERIC EQUIVALENT) EC tablet 540 mg  540 mg Oral BID Fabi Aguirre NP   540 mg at 06/26/24 0758    pantoprazole (PROTONIX) EC tablet 40 mg  40 mg Oral Daily Ryder Cortez MD   40 mg at 06/26/24 0757    polyethylene glycol (MIRALAX) Packet 17 g  17 g Oral Daily Fabi Aguirre NP   17 g at 06/25/24 0747    QUEtiapine (SEROquel) tablet 25 mg  25 mg Oral QPM Fabi Aguirre NP   25 mg at 06/25/24 1956    senna-docusate (SENOKOT-S/PERICOLACE) 8.6-50 MG per tablet 1-2 tablet  1-2 tablet Oral BID Fabi Aguirre NP   2 tablet at 06/25/24 1956    sodium chloride (PF) 0.9% PF flush 3 mL  3 mL Intracatheter Q8H Marlee Chinchilla MD   3 mL at 06/26/24 1147    sodium zirconium cyclosilicate (LOKELMA) packet 10 g  10 g Oral Once per day on Tuesday Thursday Saturday Fabi Aguirre NP   10 g at 06/25/24 0745    ursodiol (ACTIGALL) capsule 300 mg  300 mg Oral BID Fabi Aguirre NP   300 mg at 06/26/24 0757       Physical Exam:     Admit Weight: 81.7 kg (180 lb 1.6 oz)    Current Vitals:   BP (!) 115/91 (BP Location: Right arm, Patient Position:  Supine, Cuff Size: Adult Regular)   Pulse 108   Temp 98.8  F (37.1  C)   Resp 16   Wt 81.7 kg (180 lb 1.6 oz)   SpO2 98%   BMI 26.98 kg/m      Vital sign ranges:    Temp:  [98  F (36.7  C)-98.8  F (37.1  C)] 98.8  F (37.1  C)  Pulse:  [] 108  Resp:  [15-18] 16  BP: (105-155)/() 115/91  SpO2:  [97 %-100 %] 98 %    General Appearance: in no apparent distress.   Skin: normal, warm, dry  Heart: perfused  Lungs: unlabored on RA  Abdomen: abdomen soft  : no paris  Extremities: edema: none noted.  Neurologic: A&Ox4 on exam this morning    Frailty Scores          12/19/2023   Frailty Scores   Final Score Frail   Final Score Number 4      Details                   Data:   CMP  Recent Labs   Lab 06/26/24  1139 06/26/24  0749 06/26/24  0623 06/26/24  0158 06/26/24  0042 06/25/24  0759 06/25/24  0644   NA  --   --  137  --  137  --  138   POTASSIUM  --   --  3.8  --  3.7  --  3.8   CHLORIDE  --   --  103  --  103  --  103   CO2  --   --  21*  --  22  --  23   * 183* 198*   < > 209*   < > 105*   BUN  --   --  30.4*  --  30.8*  --  28.7*   CR  --   --  1.32*  --  1.45*  --  1.21*   GFRESTIMATED  --   --  65  --  58*  --  72   MEG  --   --  9.4  --  9.5  --  9.7   MAG  --   --  1.5*  --   --   --  1.6*   PHOS  --   --  3.4  --   --   --  3.2   ALBUMIN  --   --  3.9  --   --   --  4.0   BILITOTAL  --   --  0.5  --   --   --  0.5   ALKPHOS  --   --  84  --   --   --  84   AST  --   --  29  --   --   --  25   ALT  --   --  18  --   --   --  17    < > = values in this interval not displayed.     CBC  Recent Labs   Lab 06/26/24  0623 06/25/24  0644 06/24/24  0624 06/21/24  1013   HGB 11.9* 12.3*   < > 13.9   WBC 7.4 6.5   < > 6.8    151   < > 170   A1C  --   --   --  7.3*    < > = values in this interval not displayed.     I have reviewed history, examined patient and discussed plan with the fellow/resident/JOSELYN.  I concur with the findings in this note.

## 2024-06-26 NOTE — PLAN OF CARE
Vitals: stable room air.   Neuro : oriented  name, place, year, but didn't know why he was admitted.   Blood glucose: achs 163. 144 Carb coverage and sliding scale covered.   Pain/nausea: denies.   Diet: 2 gram K.   Lines: PIV R, L saline locked.   : voids well.   GI: x 1 this morning.   Drains: NA.   Skin: scab on head. Sticky note page to team.   Mobility: up ad amy.

## 2024-06-26 NOTE — PLAN OF CARE
Goal Outcome Evaluation:  Notified Dr. Ricardo, surgery cross cover, that his heart rate is now running 158-160. He stated that he will put in for a 12 lead. Will continue to monitor and report any significant changes.

## 2024-06-26 NOTE — PROGRESS NOTES
Brief progress note    Patient had a code 21 called around midnight.  Patient was seen at bedside by the [facility] surgery resident.  The patient had his arms restrained by security and nursing staff.  During the events, all dangerous items were removed from room.  Additionally the patient had pulled out his intravenous catheters.  Once more calm and sitting on the bed with security and staff, a dressing was placed on the patients wound where his intravenous catheter had been. Patient became calm and was standing  with security and the healthcare team. He then turned and attempted to jump out the window hitting his head on the glass.  At this point it was decided that the patient would need to be given medication due to his agitation. The patient was ordered 5mg of Haldol IM and was restrained on all four extremities and around his chest. The patient was also ordered a bedside sitter. The patient's spouse was called and the patient spoke with her as restraints were being placed. It was was decided that she would come to the hospital to be bedside with the patient. A non-contrast CT scan was ordered to look for intracranial hemorrhage. BMP was ordered for electrolytes and cardiac monitoring was ordered for the administration of haldol to monitor Qtc. Transplant fellow was notified regarding the patient's condition.  Prior to the patient's CT scan, he had an increase in heart rate that reached into the 150s.  When seen at bedside, the patient had a heart rate ranging from 100 2234 bpm.  EKG was obtained which confirmed the patient was having sinus tachycardia.  The patient's vitals are stable at bedside with a heart rate of 126, blood pressure 120/90, MAP of 99, QTc of 456 ms and oxygen saturation of 99%.  The patient was following commands and had a normal neuro exam.  After the patient underwent imaging, it was found that he had no acute intracranial pathology.  Acquisitions displayed a calcified falx in the  frontal cortex at midline.  The patient also after his noncontrasted CT scan continue to have a decrease in his pulse rate.  Last checked it was 105.  Blood pressure remained stable and the patient remained afebrile, and was oxygenating appropriately.  Restraints were reordered for only the patient's right wrist and left wrist.  These will need to be renewed after 4 hours from approximately 4 AM on June 26, 2024.  The patient also has as needed orders for 5 mg of Haldol every hour as needed for agitation.  The patient can have up to 30 mg of Haldol per day.  Patient and bedside RN to page for any acute changes in the patient status.    Sher Ricardo MD  Surgery cross cover

## 2024-06-26 NOTE — CONSULTS
"          Initial Psychiatric Consult   Consult date: June 26, 2024         Reason for Consult, requesting source:    AMS, agitation   Requesting source: Ryder Cortez    Labs and imaging reviewed. Discussed with nursing and team, pharmacist     Total time spent in chart review, patient interview and coordination of care; 65 min          HPI:   From progress note 6/25:   \" Mary is a 53 y.o. 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 Treated with Methylprednisolone 500mg x3 (3/16-3/18) followed by taper currently maintained on tacrolimus and MMF with goal tacrolimus level of around 10, hypertension off medication, hyperlipidemia type 2 diabetes mellitus Maintain on Jardiance and insulin, pulmonary hypertension with history of fluid overload, atrial fibrillation, history of acute kidney injury, chronic kidney disease stage II with baseline serum creatinine around 1 mg/dL, anemia due to chronic disease with a history of GI bleed secondary to varices who was visiting [place] for a wedding where he was found to be confused. He had labs drawn 6/21 since he was confused, which showed potassium of 7.3 with creatinine of 2.1 was advised to go to the emergency room, tacrolimus level was 12.5 on 6/11 with serum creatinine of 1.3 mg/dL and dose was decreased to 5 mg twice a day from total of 11 mg a day recently.  Patient also was noted to have increasing tremors in OSH.  There was a concern about tacrolimus toxicity for that reason dose was withheld last Saturday, last dose was yesterday morning.  Patient denied using NSAIDs.Ccreatinine was 2.7 mg/dL potassium was 6.2, he was treated with continuous NEB  calcium chloride dextrose and insulin, but did not receive Lokelma.  Patient denies any recent " "acute illnesses, no recent antibiotic use.  No diarrhea he is rather constipated.  Has been somewhat confused with increasing tremors.  Has been feeling dizzy intermittently. \"    A code 21 was called last night when he suddenly became aggravated, pulled out 2 PIVs, got out of bed, started yelling and then hit head on the window. Restraints and 5 mg IM Haldol was ordered.   I see that he was quite confused and disoriented when talking to social work with his wife on 6/24.  Also his nurse had noted that he was quite confused and disoriented this morning.  During my visit today he was slightly disoriented but overall functioning quite well.  I see that he was delirious with VH and AMS for quite awhile after his transplant, and wife mentions that he sometimes confused in the evenings with onset at about 8 pm, but no further hallucinations.   Due to poor sleep his PCP had prescribed Seroquel 25 mg HS on 6/21, but it was not started prior to the events leading to this admission.   He does have a long history of social anxiety and was also having some nightmares for quite a long time that he was medicating with alcohol.  No history of trauma or PTSD symptoms.  He has some excessive worry at times but no panic attacks.  No significant mood swings or manic symptoms.             Past Psychiatric History:   He has a history of some mild depression and long-term anxiety but no psychiatric hospitalizations and medication trials have been limited to Seroquel.        Substance Use and History:   He stopped using alcohol after he learned of his liver disease 1 year ago.  No drug use and he quit smoking cigarettes 17 years ago after he met his wife.        Past Medical History:   PAST MEDICAL HISTORY:   Past Medical History:   Diagnosis Date    ANGEL (acute kidney injury) (H24)     Alcoholic cirrhosis of liver without ascites (H) 07/13/2023    Alcoholic hepatitis with ascites (H28) 10/03/2023    Alcoholic hepatitis without ascites " (H28) 2023    Closed fracture of one rib of left side 2023    Concussion without loss of consciousness 2020    Decompensated hepatic cirrhosis (H) 09/15/2023    Diabetes mellitus, type 2 (H) 2023    Essential hypertension 2020    Ganglion cyst of wrist, right 2024    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)     Liver replaced by transplant (H) 2024    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    ACR PRAJAPATI 6-7, treated with steroids    Mild hyperlipidemia 2021    Persistent insomnia 2023    Portal hypertension (H) 2023    Scrotal abscess     Secondary esophageal 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 (H) 2023    Tobacco abuse disorder 2020    Type 2 diabetes mellitus with hyperglycemia (H) 2023       PAST SURGICAL HISTORY:   Past Surgical History:   Procedure Laterality Date    BENCH LIVER  3/5/2024    Procedure: Bench liver;  Surgeon: Ryder Cortez MD;  Location: [facility]    CHOLECYSTECTOMY      COLONOSCOPY N/A 2024    Procedure: COLONOSCOPY, WITH POLYPECTOMY;  Surgeon: Jak Urbina MD;  Location: [facility]    CV RIGHT HEART CATH MEASUREMENTS RECORDED N/A 2024    Procedure: Right Heart Catheterization;  Surgeon: Alfred Tafoya MD;  Location:  HE[facility] CARDIAC CATH LAB    IR LIVER BIOPSY PERCUTANEOUS  3/15/2024    IR RETROPERITONEAL ABSCESS DRAINAGE  2024    TONSILLECTOMY      TRANSPLANT LIVER RECIPIENT  DONOR N/A 3/5/2024    Procedure: Transplant liver recipient  donor, bile duct stent placement;  Surgeon: Ryder Cortez MD;  Location: [facility] OR    VASECTOMY               Family History:   FAMILY HISTORY:   Family History   Problem Relation Age of Onset    Anxiety Disorder Mother     Depression Mother     Bipolar Disorder Mother     Chronic Obstructive Pulmonary Disease Mother     Lung Cancer Mother 81    Morbid Obesity Father     Diabetes Father     Diabetes Type 2  Brother     Substance Abuse  Maternal Grandfather     Substance Abuse Paternal Grandfather     Colon Cancer No family hx of     Liver Disease No family hx of    Substance use problems on his mother's side of the family        Social History:   [place] a .  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         Physical ROS:   The 10 point Review of Systems is negative other than noted in the HPI or here.           Medications:     Current Facility-Administered Medications   Medication Dose Route Frequency Provider Last Rate Last Admin    cycloSPORINE modified (GENERIC EQUIVALENT) capsule 275 mg  275 mg Oral BID IS Ryder Cortez MD   275 mg at 06/26/24 0756    dapsone (ACZONE) tablet 50 mg  50 mg Oral Daily Fabi Aguirre NP   50 mg at 06/26/24 0756    empagliflozin (JARDIANCE) tablet 25 mg  25 mg Oral Daily Eugenie Pascal APRN CNP   25 mg at 06/26/24 0953    fludrocortisone (FLORINEF) tablet 0.1 mg  0.1 mg Oral Once per day on Monday Wednesday Friday Fabi Aguirre NP   0.1 mg at 06/26/24 0817    insulin aspart (NovoLOG) injection (RAPID ACTING)  1-7 Units Subcutaneous TID AC Fabi Aguirre NP   3 Units at 06/26/24 1142    insulin aspart (NovoLOG) injection (RAPID ACTING)  1-5 Units Subcutaneous At Bedtime Fabi Aguirre NP        insulin aspart (NovoLOG) injection (RAPID ACTING)   Subcutaneous TID w/meals Fabi Aguirre NP   3 Units at 06/25/24 1907    insulin degludec (TRESIBA) 100 UNIT/ML injection 13 Units  13 Units Subcutaneous QAM Fabi Aguirre NP   13 Units at 06/26/24 0811    magnesium oxide (MAG-OX) tablet 400 mg  400 mg Oral Daily Fabi Aguirre NP   400 mg at 06/26/24 0757    mycophenolic acid (GENERIC EQUIVALENT) EC tablet 540 mg  540 mg Oral BID Fabi Aguirre NP   540 mg at 06/26/24 0758     pantoprazole (PROTONIX) EC tablet 40 mg  40 mg Oral Daily Ryder Cortez MD   40 mg at 06/26/24 0757    polyethylene glycol (MIRALAX) Packet 17 g  17 g Oral Daily Fabi Aguirre NP   17 g at 06/25/24 0747    QUEtiapine (SEROquel) tablet 25 mg  25 mg Oral QPM Fabi Aguirre NP   25 mg at 06/25/24 1956    senna-docusate (SENOKOT-S/PERICOLACE) 8.6-50 MG per tablet 1-2 tablet  1-2 tablet Oral BID Fabi Aguirre NP   2 tablet at 06/25/24 1956    sodium chloride (PF) 0.9% PF flush 3 mL  3 mL Intracatheter Q8H Marlee Chinchilla MD   3 mL at 06/26/24 1147    sodium zirconium cyclosilicate (LOKELMA) packet 10 g  10 g Oral Once per day on Tuesday Thursday Saturday Fabi Aguirre NP   10 g at 06/25/24 0745    ursodiol (ACTIGALL) capsule 300 mg  300 mg Oral BID Fabi Aguirre NP   300 mg at 06/26/24 0757              Allergies:     Allergies   Allergen Reactions    Other Food Allergy Anaphylaxis     Legumes (black beans, baked beans, chickpeas)    Pineapple Itching          Labs:     Recent Results (from the past 48 hour(s))   UA with Microscopic reflex to Culture    Collection Time: 06/24/24  4:54 PM    Specimen: Urine, Clean Catch   Result Value Ref Range    Color Urine Light Yellow Colorless, Straw, Light Yellow, Yellow    Appearance Urine Clear Clear    Glucose Urine >=1000 (A) Negative mg/dL    Bilirubin Urine Negative Negative    Ketones Urine 10 (A) Negative mg/dL    Specific Gravity Urine 1.022 1.003 - 1.035    Blood Urine Negative Negative    pH Urine 5.5 5.0 - 7.0    Protein Albumin Urine Negative Negative mg/dL    Urobilinogen Urine Normal Normal, 2.0 mg/dL    Nitrite Urine Negative Negative    Leukocyte Esterase Urine Negative Negative    Mucus Urine Present (A) None Seen /LPF    RBC Urine 0 <=2 /HPF    WBC Urine 0 <=5 /HPF   Glucose by meter    Collection Time: 06/24/24  5:05 PM   Result Value Ref Range    GLUCOSE BY METER POCT 180 (H) 70 - 99 mg/dL   Glucose by meter     Collection Time: 06/24/24 10:20 PM   Result Value Ref Range    GLUCOSE BY METER POCT 154 (H) 70 - 99 mg/dL   Basic metabolic panel    Collection Time: 06/25/24  6:44 AM   Result Value Ref Range    Sodium 138 135 - 145 mmol/L    Potassium 3.8 3.4 - 5.3 mmol/L    Chloride 103 98 - 107 mmol/L    Carbon Dioxide (CO2) 23 22 - 29 mmol/L    Anion Gap 12 7 - 15 mmol/L    Urea Nitrogen 28.7 (H) 6.0 - 20.0 mg/dL    Creatinine 1.21 (H) 0.67 - 1.17 mg/dL    GFR Estimate 72 >60 mL/min/1.73m2    Calcium 9.7 8.6 - 10.0 mg/dL    Glucose 105 (H) 70 - 99 mg/dL   Hepatic panel    Collection Time: 06/25/24  6:44 AM   Result Value Ref Range    Protein Total 6.5 6.4 - 8.3 g/dL    Albumin 4.0 3.5 - 5.2 g/dL    Bilirubin Total 0.5 <=1.2 mg/dL    Alkaline Phosphatase 84 40 - 150 U/L    AST 25 0 - 45 U/L    ALT 17 0 - 70 U/L    Bilirubin Direct <0.20 0.00 - 0.30 mg/dL   CBC with platelets    Collection Time: 06/25/24  6:44 AM   Result Value Ref Range    WBC Count 6.5 4.0 - 11.0 10e3/uL    RBC Count 3.95 (L) 4.40 - 5.90 10e6/uL    Hemoglobin 12.3 (L) 13.3 - 17.7 g/dL    Hematocrit 34.2 (L) 40.0 - 53.0 %    MCV 87 78 - 100 fL    MCH 31.1 26.5 - 33.0 pg    MCHC 36.0 31.5 - 36.5 g/dL    RDW 12.8 10.0 - 15.0 %    Platelet Count 151 150 - 450 10e3/uL   Tacrolimus by Tandem Mass Spectrometry    Collection Time: 06/25/24  6:44 AM   Result Value Ref Range    Tacrolimus by Tandem Mass Spectrometry 4.2 (L) 5.0 - 15.0 ug/L    Tacrolimus Last Dose Date      Tacrolimus Last Dose Time     Magnesium    Collection Time: 06/25/24  6:44 AM   Result Value Ref Range    Magnesium 1.6 (L) 1.7 - 2.3 mg/dL   Phosphorus    Collection Time: 06/25/24  6:44 AM   Result Value Ref Range    Phosphorus 3.2 2.5 - 4.5 mg/dL   Glucose by meter    Collection Time: 06/25/24  7:59 AM   Result Value Ref Range    GLUCOSE BY METER POCT 139 (H) 70 - 99 mg/dL   Glucose by meter    Collection Time: 06/25/24  5:01 PM   Result Value Ref Range    GLUCOSE BY METER POCT 165 (H) 70 - 99  mg/dL   Glucose by meter    Collection Time: 06/25/24  5:08 PM   Result Value Ref Range    GLUCOSE BY METER POCT 163 (H) 70 - 99 mg/dL   Glucose by meter    Collection Time: 06/25/24  9:46 PM   Result Value Ref Range    GLUCOSE BY METER POCT 144 (H) 70 - 99 mg/dL   Basic metabolic panel    Collection Time: 06/26/24 12:42 AM   Result Value Ref Range    Sodium 137 135 - 145 mmol/L    Potassium 3.7 3.4 - 5.3 mmol/L    Chloride 103 98 - 107 mmol/L    Carbon Dioxide (CO2) 22 22 - 29 mmol/L    Anion Gap 12 7 - 15 mmol/L    Urea Nitrogen 30.8 (H) 6.0 - 20.0 mg/dL    Creatinine 1.45 (H) 0.67 - 1.17 mg/dL    GFR Estimate 58 (L) >60 mL/min/1.73m2    Calcium 9.5 8.6 - 10.0 mg/dL    Glucose 209 (H) 70 - 99 mg/dL   EKG 12-lead, complete    Collection Time: 06/26/24  1:53 AM   Result Value Ref Range    Systolic Blood Pressure  mmHg    Diastolic Blood Pressure  mmHg    Ventricular Rate 130 BPM    Atrial Rate 130 BPM    OR Interval 144 ms    QRS Duration 82 ms     ms    QTc 438 ms    P Axis 45 degrees    R AXIS -10 degrees    T Axis 63 degrees    Interpretation ECG       Sinus tachycardia  Nonspecific T wave abnormality  Abnormal ECG  When compared with ECG of 22-JUN-2024 22:04,  Vent. rate has increased BY  57 BPM  ST now depressed in Anterior leads  Confirmed by MD DENISSE, PARISH ([id number]) on 6/26/2024 10:07:33 AM     Glucose by meter    Collection Time: 06/26/24  1:58 AM   Result Value Ref Range    GLUCOSE BY METER POCT 196 (H) 70 - 99 mg/dL   Basic metabolic panel    Collection Time: 06/26/24  6:23 AM   Result Value Ref Range    Sodium 137 135 - 145 mmol/L    Potassium 3.8 3.4 - 5.3 mmol/L    Chloride 103 98 - 107 mmol/L    Carbon Dioxide (CO2) 21 (L) 22 - 29 mmol/L    Anion Gap 13 7 - 15 mmol/L    Urea Nitrogen 30.4 (H) 6.0 - 20.0 mg/dL    Creatinine 1.32 (H) 0.67 - 1.17 mg/dL    GFR Estimate 65 >60 mL/min/1.73m2    Calcium 9.4 8.6 - 10.0 mg/dL    Glucose 198 (H) 70 - 99 mg/dL   Hepatic panel    Collection Time: 06/26/24   "6:23 AM   Result Value Ref Range    Protein Total 6.4 6.4 - 8.3 g/dL    Albumin 3.9 3.5 - 5.2 g/dL    Bilirubin Total 0.5 <=1.2 mg/dL    Alkaline Phosphatase 84 40 - 150 U/L    AST 29 0 - 45 U/L    ALT 18 0 - 70 U/L    Bilirubin Direct <0.20 0.00 - 0.30 mg/dL   CBC with platelets    Collection Time: 06/26/24  6:23 AM   Result Value Ref Range    WBC Count 7.4 4.0 - 11.0 10e3/uL    RBC Count 3.93 (L) 4.40 - 5.90 10e6/uL    Hemoglobin 11.9 (L) 13.3 - 17.7 g/dL    Hematocrit 34.6 (L) 40.0 - 53.0 %    MCV 88 78 - 100 fL    MCH 30.3 26.5 - 33.0 pg    MCHC 34.4 31.5 - 36.5 g/dL    RDW 12.7 10.0 - 15.0 %    Platelet Count 154 150 - 450 10e3/uL   Magnesium    Collection Time: 06/26/24  6:23 AM   Result Value Ref Range    Magnesium 1.5 (L) 1.7 - 2.3 mg/dL   Phosphorus    Collection Time: 06/26/24  6:23 AM   Result Value Ref Range    Phosphorus 3.4 2.5 - 4.5 mg/dL   Glucose by meter    Collection Time: 06/26/24  7:49 AM   Result Value Ref Range    GLUCOSE BY METER POCT 183 (H) 70 - 99 mg/dL   Glucose by meter    Collection Time: 06/26/24 11:39 AM   Result Value Ref Range    GLUCOSE BY METER POCT 252 (H) 70 - 99 mg/dL          Physical and Psychiatric Examination:     BP (!) 115/91 (BP Location: Right arm, Patient Position: Supine, Cuff Size: Adult Regular)   Pulse 108   Temp 98.8  F (37.1  C)   Resp 16   Wt 81.7 kg (180 lb 1.6 oz)   SpO2 98%   BMI 26.98 kg/m    Weight is 180 lbs 1.6 oz  Body mass index is 26.98 kg/m .    Physical Exam:  I have reviewed the physical exam as documented by by the medical team and agree with findings and assessment and have no additional findings to add at this time.         MSE:   Appearance: awake, alert and adequately groomed  Attitude:  cooperative  Eye Contact:  fair  Mood:  \"OK\"  Affect:  slightly restricted  Speech:  clear, coherent  Psychomotor Behavior:  no evidence of tardive dyskinesia, dystonia, or tics  Muscle strength and tone: intact   Thought Process:  " "circumstantial  Associations:  no loose associations  Thought Content:  no evidence of suicidal ideation or homicidal ideation and no evidence of psychotic thought  Insight:  fair  Judgement:  fair  Oriented to:  person and place, year, but not day of the week or date   Attention Span and Concentration:  fair  Recent and Remote Memory:  fair      QTc: 438 ms today          DSM-5 Diagnosis:   Delirium   311 (F32.9) Unspecified Depressive Disorder   300.23 (F40.10) Social Anxiety Disorder  300.02 (F41.1) Generalized Anxiety Disorder  Alcohol use disorder, in sustained remission           Assessment:   He is having ongoing problems with sundowning and sleep difficulties; he will need something in the evening. During his post op delirium he was doing well with Seroquel.   His wife thinks that he if safe to go without a sitter during the day, but should a sitter after 9 pm.           Summary of Recommendations:   Seroquel 12.5-25 mg at 1900 scheduled and 25-50 mg HS PRN sleep.   Haldol 2-5 mg IV q 4 hours for agitation.   He and his wife were told that he should use 25 mg of Seroquel as needed outside the hospital for sleep or confusion.     Contact me or re-consult psychiatry as needed (psychiatry is signing off).     Rich Ramsey M.D.   Consult liaison psychiatry   [facility]   Can message me with [employer]  If I am not available, then [facility] intake ([phone]) should know who   Is on call        \"This dictation was performed with voice recognition software and may contain errors,  omissions and inadvertent word substitution.\"           "

## 2024-06-26 NOTE — CONSULTS
[facility]  Neurology Consultation    Patient Name:  Mary Lopez  MRN:  [id number]    :  1970  Date of Service:  2024  Primary care provider:  Jimmie Hoyt      Neurology consultation service was asked to see Mary Lopez by Dr. Cortez to evaluate altered mental status.    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     With presumptive diagnosis of tacrolimus toxicity, patient was transferred to the [facility] Transplant service.  Level drawn  was 6.3, but notably this was obtained ~2 days after presentation, after significant fluid resuscitation, and hold of tacrolimus at OSH.  MRI w/w/o was obtained, which was significant for faint periventricular t2 hyperintensity in the occipital periventricular white matter, but was otherwise unremarkable.  Mr Lopez has continued to have waxing/waning confusion and agitation, especially overnight (requiring behavioral emergency activation, multiple people holding Tana down, and significant emergent antipsychotic administration overnight yesterday), prompting neurology consultation.      On discussion with Jessica Morris, he reports he currently feels well.  No localizing infectious symptoms, no pain, no focal motor or sensory symptoms.  He tells me he does not clearly recall presenting to OSH, his course while there, or his initial hospital stay.  He does not have a terribly detailed recollection of events last night prompting our consultation, but does recall being  "distressed and confused, but is not completely sure why.    His wife at bedside tells me he was quite paranoid, and confused when he called her in the middle of the night yesterday.  During the day, he has been more subtly confused -- intermittently losing the thread of the conversation, less \"perky\" but has otherwise been near his normal (notably without clear focal motor, sensory, or speech deficit).  Overnight however, he becomes quite floridly confused.  She tells me that this has occurred before with prior medical illnesses.    They both deny prior history of seizure, convulsions, CNS infection, developmental delay, episodes of lost time (beyond that described above), significant head injury.  They do report 2 \"concussions\" after falls from standing height, but no clear post concussive symptoms.       ROS  A comprehensive ROS was performed and pertinent findings were included in HPI.     PMH  Past Medical History:   Diagnosis Date    ANGEL (acute kidney injury) (H24)     Alcoholic cirrhosis of liver without ascites (H) 07/13/2023    Alcoholic hepatitis with ascites (H28) 10/03/2023    Alcoholic hepatitis without ascites (H28) 07/13/2023    Closed fracture of one rib of left side 09/14/2023    Concussion without loss of consciousness 03/11/2020    Decompensated hepatic cirrhosis (H) 09/15/2023    Diabetes mellitus, type 2 (H) 11/22/2023    Essential hypertension 03/11/2020    Ganglion cyst of wrist, right 04/18/2024    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)     Liver replaced by transplant (H) 03/05/2024    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    ACR PRAJAPATI 6-7/9, treated with steroids    Mild hyperlipidemia 12/07/2021    Persistent insomnia 07/13/2023    Portal hypertension (H) 07/13/2023    Scrotal abscess     Secondary esophageal 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 (H) 07/13/2023    Tobacco abuse disorder 03/11/2020    Type 2 diabetes mellitus with hyperglycemia (H) 12/22/2023     Past Surgical History: "   Procedure Laterality Date    BENCH LIVER  3/5/2024    Procedure: Bench liver;  Surgeon: Ryder Cortez MD;  Location: [facility] OR    CHOLECYSTECTOMY      COLONOSCOPY N/A 2024    Procedure: COLONOSCOPY, WITH POLYPECTOMY;  Surgeon: Jak Urbina MD;  Location: [facility]    CV RIGHT HEART CATH MEASUREMENTS RECORDED N/A 2024    Procedure: Right Heart Catheterization;  Surgeon: Alfred Tafoya MD;  Location:  HEART CARDIAC CATH LAB    IR LIVER BIOPSY PERCUTANEOUS  3/15/2024    IR RETROPERITONEAL ABSCESS DRAINAGE  2024    TONSILLECTOMY      TRANSPLANT LIVER RECIPIENT  DONOR N/A 3/5/2024    Procedure: Transplant liver recipient  donor, bile duct stent placement;  Surgeon: Ryder Cortez MD;  Location: [facility] OR    VASECTOMY         Medications   I have personally reviewed the patient's medication list.     Allergies  I have personally reviewed the patient's allergy list.     Social History  Social History     Socioeconomic History    Marital status:    Occupational History    Occupation: Not working now   Tobacco Use    Smoking status: Former     Types: Cigarettes     Passive exposure: Never    Smokeless tobacco: Current     Types: Chew     Last attempt to quit: 2004    Tobacco comments:     Chew daily 1/8 of a tin per day   Vaping Use    Vaping status: Never Used   Substance and Sexual Activity    Alcohol use: Not Currently     Alcohol/week: 12.0 standard drinks of alcohol     Types: 12 Standard drinks or equivalent per week     Comment: Sober since 2023    Drug use: Not Currently    Sexual activity: Yes     Partners: Female     Birth control/protection: Male Surgical   Other Topics Concern    Parent/sibling w/ CABG, MI or angioplasty before 65F 55M? No     Social Determinants of Health     Financial Resource Strain: Low Risk  (2023)    Financial Resource Strain     Within the past 12 months, have you or your family members you live with been unable to get  utilities (heat, electricity) when it was really needed?: No   Food Insecurity: No Food Insecurity (6/22/2024)    Received from [facility]    Hunger Vital Sign     Worried About Running Out of Food in the Last Year: Never true     Ran Out of Food in the Last Year: Never true   Transportation Needs: No Transportation Needs (6/22/2024)    Received from [facility]    PRAPARE - Transportation     Lack of Transportation (Medical): No     Lack of Transportation (Non-Medical): No   Interpersonal Safety: Low Risk  (12/22/2023)    Interpersonal Safety     Do you feel physically and emotionally safe where you currently live?: Yes     Within the past 12 months, have you been hit, slapped, kicked or otherwise physically hurt by someone?: No     Within the past 12 months, have you been humiliated or emotionally abused in other ways by your partner or ex-partner?: No   Housing Stability: Low Risk  (6/22/2024)    Received from [facility]    Housing Stability Vital Sign     Unable to Pay for Housing in the Last Year: No     Number of Times Moved in the Last Year: 0     Homeless in the Last Year: No       Family History    family history includes Anxiety Disorder in his mother; Bipolar Disorder in his mother; Chronic Obstructive Pulmonary Disease in his mother; Depression in his mother; Diabetes in his father; Diabetes Type 2  in his brother; Lung Cancer (age of onset: 81) in his mother; Morbid Obesity in his father; Substance Abuse in his maternal grandfather and paternal grandfather.     Physical Examination   Vitals: BP (!) 115/91 (BP Location: Right arm, Patient Position: Supine, Cuff Size: Adult Regular)   Pulse 108   Temp 98.8  F (37.1  C)   Resp 16   Wt 81.7 kg (180 lb 1.6 oz)   SpO2 98%   BMI 26.98 kg/m    General: Lying in bed, NAD  Head: NC/AT  Eyes: no icterus, op pink and moist  Cardiac: RRR. Extremities warm, no edema.   Respiratory: non-labored on RA  GI: S/NT/ND  Skin: No rash or  lesion on exposed skin  Psych: Mood pleasant, affect congruent  Neuro:  Mental status: Awake, alert, attentive, oriented to self, time, place, and circumstance. Language is fluent and coherent with intact comprehension of complex commands, naming and repetition.  Cranial nerves: VFF, PERRL, conjugate gaze, EOMI, facial sensation intact, face symmetric, shoulder shrug strong, tongue/uvula midline, no dysarthria.   Motor: Normal bulk and tone. No abnormal movements. 5/5 strength bilaterally in deltoids, biceps, triceps, hand , hip flexors, hip extensors, knee flexion, knee extension, plantarflexion, dorsiflexion.   Reflexes: Normo-reflexic and symmetric biceps, brachioradialis, triceps, patellae, and achilles. Negative La, no clonus, toes down-going.  Sensory: Intact to light touch, pin, vibration, and proprioception in proximal and distal aspects of all 4 extremities   Coordination: FNF and HS without ataxia or dysmetria. Rapid alternating movements intact.   Gait: Normal width, stride length, turn, and arm swing. Station normal. Heel, toe, and tandem walk intact.    Investigations   I have personally reviewed pertinent labs, tests, and radiological imaging. Discussion of notable findings is included under Impression.     #MRI Brain w/w/o  1. No acute intracranial pathology.  2. No imaging finding to suggest PRES or explain patient's confusion.  Faint, T2 hyperintense signal within the periventricular white matter  of the occipital lobes is nonspecific.    3. Right asymmetric mucosal thickening at the nasopharynx. Direct  visualization is recommended.    Was patient transferred from outside hospital?   Yes - I have personally reviewed pertinent notes, labs, and images (if available) from outside hospital.    Impression  #Toxic/metabolic encephalopathy, multifactorial: tacrolimus toxicity, s/p severe ANGEL, severe onset insomnia  #Hospital acquired delirium  #c/f diminished cognitive reserve: post-EtOH sequale  vs occult neurodegenerative disease  Mr Lopez is a 53 year old male presenting with waxing and waning confusion, paranoia, in the setting of severe ANGEL with presumed tacrolimus supra-therapeutic levels (level unfortunately drawn late into course), all grossly consistent with toxic/metabolic encephalopathy.  MRI brain reassuring against structural pathology, PRES/RCVS spectrum disorders (for which patient is at significant risk), no specific historical findings concerning for seizure.  Waxing and waning course, (notably predominantly nocturnally in the setting of multiple more likely drivers for encephalopathy), with a reassuring MRI and exam, makes autoimmune or infectious encephalitides quite unlikely.    With this in mind, acutely, we would recommend rounding out toxic/metabolic work-up as below, some evening medications to promote sleep/rest, and strict delirium precautions.      Longer term, given recurrent episodes of delirium with medical illness, w/ atrophy evident on imaging I am concerned Mr. Lopez has a diminished cognitive reserve (which places him at elevated risk of neurologic decompensation with toxic/metabolic insults, from which it may take him longer to clear).  It is possible that this is all attributable to prior EtOH use, however, to further investigate for possible underlying neurodegenerative disease it would not be unreasonable to provide Mr. Lopez with outpatient neurology follow-up    Recommendations  -3mg melatonin, timed for around dusk (~8-9 PM)  -50 mg at bedtime Seroquel  -Please check B12  -We will re-examine in the AM, further recommendations pending course   >If no surprises, anticipate signing off tomorrow  -Delirium precautions:  >frequent reorientation  >Sleep hygiene / normal day-night cycles:   *Blinds up / awake during day, get out of bed as able.    *Overnight, facilitate sleep: Minimize interruptions, avoid bright lights, TV off, avoid loud noises, keep room  slightly cool  >Encourage family visits, minimize unfamiliar clutter, allow for accoutrements of home as able   *If possible, please allow patient's family to stay overnight  >Avoid sensory deprivation (provide patient with eyeglasses or hearing aids if needed)       Thank you for involving Neurology in the care of Mary Lopez.  Please do not hesitate to call with questions/concerns (consult pager [phone]).      Patient was seen and discussed with Dr. Bolton.    Seamus Portillo MD

## 2024-06-26 NOTE — PLAN OF CARE
Goal Outcome Evaluation:  A/Ox3, disoriented to situation, no other neuro deficit noted.  calm, wrist restraints removed, up and ambulating in room and to BR, sitter at bedside, assisted with breakfast, minimal meal intake, drinking well, voiding freely, but always forgetting to save the urine despite reminders. Stated he had BM but unable to confirm. Hematoma 2x3 inch noted at the left flank area, tender to touch. Cold compresses applied. BG covered as ordered. Continued on cardiac monitor.   /77 (BP Location: Right arm)   Pulse 96   Temp 98.4  F (36.9  C) (Oral)   Resp 18   Wt 81.7 kg (180 lb 1.6 oz)   SpO2 98%   BMI 26.98 kg/m

## 2024-06-27 VITALS
TEMPERATURE: 98.4 F | OXYGEN SATURATION: 99 % | HEART RATE: 86 BPM | DIASTOLIC BLOOD PRESSURE: 93 MMHG | RESPIRATION RATE: 16 BRPM | WEIGHT: 180.1 LBS | BODY MASS INDEX: 26.98 KG/M2 | SYSTOLIC BLOOD PRESSURE: 127 MMHG

## 2024-06-27 PROBLEM — D64.9 ANEMIA DUE TO UNKNOWN MECHANISM: Chronic | Status: ACTIVE | Noted: 2023-07-13

## 2024-06-27 PROBLEM — G92.8 TOXIC METABOLIC ENCEPHALOPATHY: Status: ACTIVE | Noted: 2024-06-27

## 2024-06-27 PROBLEM — G47.00 PERSISTENT INSOMNIA: Chronic | Status: ACTIVE | Noted: 2023-07-13

## 2024-06-27 LAB
ALBUMIN SERPL BCG-MCNC: 3.7 G/DL (ref 3.5–5.2)
ALP SERPL-CCNC: 79 U/L (ref 40–150)
ALT SERPL W P-5'-P-CCNC: 16 U/L (ref 0–70)
ANION GAP SERPL CALCULATED.3IONS-SCNC: 9 MMOL/L (ref 7–15)
AST SERPL W P-5'-P-CCNC: 29 U/L (ref 0–45)
BILIRUB DIRECT SERPL-MCNC: <0.2 MG/DL (ref 0–0.3)
BILIRUB SERPL-MCNC: 0.5 MG/DL
BUN SERPL-MCNC: 29.9 MG/DL (ref 6–20)
CALCIUM SERPL-MCNC: 9.4 MG/DL (ref 8.6–10)
CHLORIDE SERPL-SCNC: 106 MMOL/L (ref 98–107)
CREAT SERPL-MCNC: 1.45 MG/DL (ref 0.67–1.17)
DEPRECATED HCO3 PLAS-SCNC: 23 MMOL/L (ref 22–29)
EGFRCR SERPLBLD CKD-EPI 2021: 58 ML/MIN/1.73M2
ERYTHROCYTE [DISTWIDTH] IN BLOOD BY AUTOMATED COUNT: 13.1 % (ref 10–15)
GLUCOSE BLDC GLUCOMTR-MCNC: 125 MG/DL (ref 70–99)
GLUCOSE BLDC GLUCOMTR-MCNC: 150 MG/DL (ref 70–99)
GLUCOSE BLDC GLUCOMTR-MCNC: 159 MG/DL (ref 70–99)
GLUCOSE SERPL-MCNC: 123 MG/DL (ref 70–99)
HCT VFR BLD AUTO: 32.3 % (ref 40–53)
HGB BLD-MCNC: 11.6 G/DL (ref 13.3–17.7)
MAGNESIUM SERPL-MCNC: 1.4 MG/DL (ref 1.7–2.3)
MCH RBC QN AUTO: 31.4 PG (ref 26.5–33)
MCHC RBC AUTO-ENTMCNC: 35.9 G/DL (ref 31.5–36.5)
MCV RBC AUTO: 88 FL (ref 78–100)
PHOSPHATE SERPL-MCNC: 4 MG/DL (ref 2.5–4.5)
PLATELET # BLD AUTO: 155 10E3/UL (ref 150–450)
POTASSIUM SERPL-SCNC: 4 MMOL/L (ref 3.4–5.3)
PROT SERPL-MCNC: 6.3 G/DL (ref 6.4–8.3)
RBC # BLD AUTO: 3.69 10E6/UL (ref 4.4–5.9)
SODIUM SERPL-SCNC: 138 MMOL/L (ref 135–145)
WBC # BLD AUTO: 9.1 10E3/UL (ref 4–11)

## 2024-06-27 PROCEDURE — 82607 VITAMIN B-12: CPT | Performed by: NURSE PRACTITIONER

## 2024-06-27 PROCEDURE — 250N000011 HC RX IP 250 OP 636: Performed by: NURSE PRACTITIONER

## 2024-06-27 PROCEDURE — 250N000013 HC RX MED GY IP 250 OP 250 PS 637: Performed by: NURSE PRACTITIONER

## 2024-06-27 PROCEDURE — 250N000013 HC RX MED GY IP 250 OP 250 PS 637: Performed by: TRANSPLANT SURGERY

## 2024-06-27 PROCEDURE — 250N000012 HC RX MED GY IP 250 OP 636 PS 637: Performed by: NURSE PRACTITIONER

## 2024-06-27 PROCEDURE — 36415 COLL VENOUS BLD VENIPUNCTURE: CPT | Performed by: NURSE PRACTITIONER

## 2024-06-27 PROCEDURE — 99233 SBSQ HOSP IP/OBS HIGH 50: CPT | Mod: GC | Performed by: STUDENT IN AN ORGANIZED HEALTH CARE EDUCATION/TRAINING PROGRAM

## 2024-06-27 PROCEDURE — 83735 ASSAY OF MAGNESIUM: CPT | Performed by: NURSE PRACTITIONER

## 2024-06-27 PROCEDURE — 80053 COMPREHEN METABOLIC PANEL: CPT | Performed by: NURSE PRACTITIONER

## 2024-06-27 PROCEDURE — 85014 HEMATOCRIT: CPT | Performed by: NURSE PRACTITIONER

## 2024-06-27 PROCEDURE — 250N000012 HC RX MED GY IP 250 OP 636 PS 637: Performed by: TRANSPLANT SURGERY

## 2024-06-27 PROCEDURE — 99233 SBSQ HOSP IP/OBS HIGH 50: CPT | Performed by: INTERNAL MEDICINE

## 2024-06-27 PROCEDURE — 84100 ASSAY OF PHOSPHORUS: CPT | Performed by: NURSE PRACTITIONER

## 2024-06-27 RX ORDER — MAGNESIUM OXIDE 400 MG/1
800 TABLET ORAL 2 TIMES DAILY
Status: DISCONTINUED | OUTPATIENT
Start: 2024-06-27 | End: 2024-06-27 | Stop reason: HOSPADM

## 2024-06-27 RX ORDER — DAPSONE 25 MG/1
50 TABLET ORAL DAILY
Qty: 132 TABLET | Refills: 0 | Status: ACTIVE | OUTPATIENT
Start: 2024-06-28 | End: 2024-07-02

## 2024-06-27 RX ORDER — CYCLOSPORINE 100 MG/1
200 CAPSULE, LIQUID FILLED ORAL 2 TIMES DAILY
Qty: 120 CAPSULE | Refills: 11 | Status: ACTIVE | OUTPATIENT
Start: 2024-06-27 | End: 2024-07-02

## 2024-06-27 RX ORDER — MAGNESIUM SULFATE HEPTAHYDRATE 40 MG/ML
2 INJECTION, SOLUTION INTRAVENOUS ONCE
Status: COMPLETED | OUTPATIENT
Start: 2024-06-27 | End: 2024-06-27

## 2024-06-27 RX ORDER — ACETAMINOPHEN 325 MG/1
975 TABLET ORAL EVERY 8 HOURS PRN
Status: SHIPPED
Start: 2024-06-27 | End: 2024-10-03

## 2024-06-27 RX ORDER — MAGNESIUM OXIDE 400 MG/1
800 TABLET ORAL 2 TIMES DAILY
Qty: 120 TABLET | Refills: 2 | Status: SHIPPED | OUTPATIENT
Start: 2024-06-27 | End: 2024-07-02 | Stop reason: ALTCHOICE

## 2024-06-27 RX ORDER — QUETIAPINE FUMARATE 25 MG/1
25-50 TABLET, FILM COATED ORAL
Status: SHIPPED
Start: 2024-06-27 | End: 2024-10-03

## 2024-06-27 RX ORDER — FLUDROCORTISONE ACETATE 0.1 MG/1
0.1 TABLET ORAL
Qty: 30 TABLET | Refills: 0 | Status: SHIPPED | OUTPATIENT
Start: 2024-06-28 | End: 2024-08-07

## 2024-06-27 RX ORDER — CYCLOSPORINE 25 MG/1
75 CAPSULE, LIQUID FILLED ORAL 2 TIMES DAILY
Qty: 180 CAPSULE | Refills: 11 | Status: ACTIVE | OUTPATIENT
Start: 2024-06-27 | End: 2024-07-02

## 2024-06-27 RX ADMIN — URSODIOL 300 MG: 300 CAPSULE ORAL at 07:50

## 2024-06-27 RX ADMIN — MAGNESIUM SULFATE HEPTAHYDRATE 2 G: 2 INJECTION, SOLUTION INTRAVENOUS at 12:27

## 2024-06-27 RX ADMIN — INSULIN DEGLUDEC 13 UNITS: 100 INJECTION, SOLUTION SUBCUTANEOUS at 07:50

## 2024-06-27 RX ADMIN — SODIUM ZIRCONIUM CYCLOSILICATE 10 G: 10 POWDER, FOR SUSPENSION ORAL at 07:49

## 2024-06-27 RX ADMIN — CYCLOSPORINE 275 MG: 100 CAPSULE, LIQUID FILLED ORAL at 07:51

## 2024-06-27 RX ADMIN — DAPSONE 50 MG: 25 TABLET ORAL at 07:50

## 2024-06-27 RX ADMIN — PANTOPRAZOLE SODIUM 40 MG: 40 TABLET, DELAYED RELEASE ORAL at 07:51

## 2024-06-27 RX ADMIN — EMPAGLIFLOZIN 25 MG: 25 TABLET, FILM COATED ORAL at 07:50

## 2024-06-27 RX ADMIN — MYCOPHENOLIC ACID 540 MG: 180 TABLET, DELAYED RELEASE ORAL at 07:49

## 2024-06-27 RX ADMIN — Medication 800 MG: at 07:50

## 2024-06-27 RX ADMIN — DOCUSATE SODIUM 50 MG AND SENNOSIDES 8.6 MG 1 TABLET: 8.6; 5 TABLET, FILM COATED ORAL at 07:51

## 2024-06-27 ASSESSMENT — ACTIVITIES OF DAILY LIVING (ADL)
ADLS_ACUITY_SCORE: 23

## 2024-06-27 NOTE — PROGRESS NOTES
[facility]  General Neurology - Progress Note    Patient Name:  Mary Lopez  Date of Service:  June 27, 2024    Subjective:    No acute events overnight.  Patient endorsed that he slept well but wakes up multiple times at night but was able to go back to sleep right away.  This morning the patient was awake alert and oriented to time place person and situation did have some difficulty finding words rarely but otherwise neurologically intact.    Objective:    Vitals: BP (!) 127/93   Pulse 86   Temp 98.4  F (36.9  C)   Resp 16   Wt 81.7 kg (180 lb 1.6 oz)   SpO2 99%   BMI 26.98 kg/m    General: Lying in bed, NAD  Head: Atraumatic, normocephalic   Cardiac: no lower extremity edema  Neurologic:  Mental status: Awake, alert, attentive, oriented to self, time, place, and circumstance. Language is fluent and coherent with intact comprehension of complex commands, and repetition.  Patient has mild difficulty naming uncommon things like index finger.  Was able to tell me how many quarters in a dollar and $0.75 could spell forward world backwards.  Cranial nerves: VFF, PERRL, conjugate gaze, EOMI, facial sensation intact, face symmetric, shoulder shrug strong, tongue/uvula midline, no dysarthria.   Motor: Normal bulk and tone. No abnormal movements. 5/5 strength bilaterally in deltoids, biceps, triceps, hand , hip flexors, hip extensors, knee flexion, knee extension, plantarflexion, dorsiflexion.   Reflexes: Normo-reflexic and symmetric biceps, brachioradialis, triceps, patellae, and achilles. Negative La, no clonus, toes down-going.  Sensory: Intact to light touch, pin, vibration, and proprioception in proximal and distal aspects of all 4 extremities   Coordination: FNF and HS without ataxia or dysmetria. Rapid alternating movements intact.   Gait: Deferred    Pertinent Investigations:    I have personally reviewed most recent and pertinent labs, tests, and  radiological images.   #MRI Brain w/w/o  1. No acute intracranial pathology.  2. No imaging finding to suggest PRES or explain patient's confusion.  Faint, T2 hyperintense signal within the periventricular white matter  of the occipital lobes is nonspecific.    3. Right asymmetric mucosal thickening at the nasopharynx. Direct  visualization is recommended.    Assessment:  #Toxic/metabolic encephalopathy, multifactorial: tacrolimus toxicity, s/p severe ANGEL, severe onset insomnia  #Hospital acquired delirium  #c/f diminished cognitive reserve: post-EtOH sequale vs occult neurodegenerative disease  Mr Lopez is a 53 year old male presenting with waxing and waning confusion, paranoia, in the setting of severe ANGEL with presumed tacrolimus supra-therapeutic levels (level unfortunately drawn late into course), all grossly consistent with toxic/metabolic encephalopathy.  MRI brain reassuring against structural pathology, PRES/RCVS spectrum disorders (for which patient is at significant risk), no specific historical findings concerning for seizure.  Waxing and waning course, (notably predominantly nocturnally in the setting of multiple more likely drivers for encephalopathy), with a reassuring MRI and exam, makes autoimmune or infectious encephalitides quite unlikely.    It is reassuring that the patient has cleared well overnight and is doing well from a neurological standpoint.  We did not think any further workup or management is required from inpatient neurology standpoint.    Longer term, given recurrent episodes of delirium with medical illness, w/ atrophy evident on imaging I am concerned Mr. Lopez has a diminished cognitive reserve (which places him at elevated risk of neurologic decompensation with toxic/metabolic insults, from which it may take him longer to clear).  It is possible that this is all attributable to prior EtOH use, however, to further investigate for possible underlying neurodegenerative  disease it would not be unreasonable to provide Mr. Lopez with outpatient neurology follow-up in addition to a neuropsychological evaluation referral on discharge.     Recommendations  - continue 3mg melatonin, timed for around dusk (~8-9 PM)  - continue 50 mg at bedtime Seroquel  -Delirium precautions:  >frequent reorientation  >Sleep hygiene / normal day-night cycles:   *Blinds up / awake during day, get out of bed as able.    *Overnight, facilitate sleep: Minimize interruptions, avoid bright lights, TV off, avoid loud noises, keep room slightly cool  >Encourage family visits, minimize unfamiliar clutter, allow for accoutrements of home as able              *If possible, please allow patient's family to stay overnight  >Avoid sensory deprivation (provide patient with eyeglasses or hearing aids if needed)  -Outpatient neurology follow-up in 8 weeks.  - Outpatient neuropsychology testing in the next available appointment.  - No further recommendations from neurology standpoint.  Kindly call us with any questions/concerns.        Patient was seen and discussed with Dr. IGLESIAS, SANIYA Izaguirre MD   Neurology resident PGY 3  [facility]

## 2024-06-27 NOTE — PLAN OF CARE
Problem: Adult Inpatient Plan of Care  Goal: Plan of Care Review  Description: The Plan of Care Review/Shift note should be completed every shift.  The Outcome Evaluation is a brief statement about your assessment that the patient is improving, declining, or no change.  This information will be displayed automatically on your shift  note.  Outcome: Progressing  Flowsheets (Taken 6/27/2024 1003)  Plan of Care Reviewed With: patient  Overall Patient Progress: improving  Goal: Absence of Hospital-Acquired Illness or Injury  Intervention: Identify and Manage Fall Risk  Recent Flowsheet Documentation  Taken 6/27/2024 0900 by Nisreen Garland RN  Safety Promotion/Fall Prevention: activity supervised  Intervention: Prevent Skin Injury  Recent Flowsheet Documentation  Taken 6/27/2024 0900 by Nisreen Garland RN  Body Position: position changed independently  Intervention: Prevent Infection  Recent Flowsheet Documentation  Taken 6/27/2024 1003 by Nisreen Garland RN  Infection Prevention: hand hygiene promoted   Goal Outcome Evaluation:      Plan of Care Reviewed With: patient    Overall Patient Progress: improvingOverall Patient Progress: improving

## 2024-06-27 NOTE — DISCHARGE SUMMARY
[facility]    Discharge Summary  Transplant Surgery    Date of Admission:  6/22/2024  Date of Discharge:  6/27/2024  Discharging Provider: Fabi Aguirre NP / Joanna Goldman MD    Discharge Diagnoses   Active Problems:    Anemia due to unknown mechanism    Persistent insomnia    Type 2 diabetes mellitus without complication, with long-term current use of insulin (H)    ANGEL (acute kidney injury) (H24)    Liver replaced by transplant (H)    Immunosuppressed status (H24)    Hypomagnesemia    Hyperkalemia    S/P liver transplant (H)    Toxic metabolic encephalopathy    History of Present Illness   Mary Lopez is a 53 year old male with a history of Laennec's Cirrhosis, 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 He was visiting [employer] for a wedding where he was found to be confused. Presented to OSH and was found to have hyperkalemia (K 7.3). Transferred to [facility] for further management.     Hospital Course   Mary Lopez was admitted on 6/22/2024.  The following problems were addressed during his hospitalization:    Hx DDLT 3/5/24: LFTs WNL.    - Continue  mg daily.      Immunosuppressed status secondary to medications:  Maintenance:   - Myfortic 540 mg BID   - Tacrolimus discontinued d/t tacrolimus toxicity 6/25.    - Cyclosporine 275 mg BID. Goal level 150-220.  Infection prophylaxis: pneumocystis (Stop Bactrim d/t hyperkalemia, changed to dapsone, G6PD WNL x 6 months post-op)    Transplant coordinator:  Lore Mckeon [phone].     Neuro:  Toxic metabolic encephalopathy; Insomnia: Likely multifactorial r/t tacrolimus toxicity, insomnia. Tacrolimus initially held, then discontinued on 6/25. MRI brain with no e/o PRES, no acute intracranial pathology. Nothing to explain encephalopathy. Infectious workup negative. Episode of acute confusion on violent behavior requiring restrains on 6/26. Pt attempted to jump out of hospital window and struck head on glass. Head CT negative. No memory of the episode this morning. Neurology and Psychiatry consulted. Baseline, patient was having issues with sundowning and insomnia prior to admission.    - Continue Seroquel 25-50 mg at bedtime PRN for sleep.      HEENT:   Nasal thickening: Seen on MRI. ENT consulted. Findings appear benign in nature with no overlying mucosal changes, and are consistent with a Throwaldt's cyst vs mucus retention cyst.    - Follow up in ENT clinic PRN if he develops nasal congestion, headaches (most commonly occipital), middle ear effusion, halitosis, persistent nasal drainage, or infectious signs/symptoms.     Hematology:   Anemia of chronic disease: Hgb ~12, stable.     Endocrine:   DM2: Continue PTA degludec, empagliflozin, and correction scale + fixed meal humalog.     Fluid/Electrolytes/Neph:   Hypomagnesemia: Mg 1.4 on day of discharge, received 2 grams IV and oral dose increased.    - Continue Mag-Ox 800 mg BID.  Hyperkalemia: likely r/t ANGEL and tacrolimus. Bactrim changed to dapsone (G6PD WNL). Resolved. Plan:   - Florinef 0.1 mg MWF.    - Lokelma 10 grams PRN  ANGEL: Likely r/t elevated tacrolimus levels. Transplant nephrology consulted. Cr 1.5 on day of discharge, baseline Cr 0.7-0.9. Improving overall.    - Follow up with Transplant Nephrology in 4-6 weeks.     MSK:  Hx Rotator cuff injury: PTA on Tramadol. Patient encouraged to use PRN Tylenol until AMS resolves.         Discharge Disposition   Discharged to home   Condition at discharge:  Stable    Pending Results   These results will be followed up by Transplant team  Unresulted Labs Ordered in the Past 30 Days of this Admission       Date and Time Order Name Status Description    6/27/2024 11:45 AM Vitamin B12 In process     6/23/2024  7:09 AM Blood Culture Hand, Left Preliminary     6/23/2024  7:09 AM Blood Culture Hand, Right Preliminary           Final pathology results: No pathology submitted  Primary Care Physician   Jimmie Hoyt    Physical Exam   Temp: 98.4  F (36.9  C) Temp src: Oral BP: (!) 127/93 Pulse: 86   Resp: 16 SpO2: 99 % O2 Device: None (Room air)    Vitals:    06/24/24 0500   Weight: 81.7 kg (180 lb 1.6 oz)     Vital Signs with Ranges  Temp:  [98.1  F (36.7  C)-98.7  F (37.1  C)] 98.4  F (36.9  C)  Pulse:  [82-96] 86  Resp:  [16-18] 16  BP: ()/(54-93) 127/93  SpO2:  [96 %-99 %] 99 %  I/O last 3 completed shifts:  In: 1375 [P.O.:1365; I.V.:10]  Out: -     Constitutional: resting comfortably in bed  Eyes: EOMI  Respiratory: unlabored on RA  Cardiovascular: RRR  GI: abdomen soft, non-distended.   Genitourinary: no Moore present  Skin: warm, dry  Musculoskeletal: no edema noted  Neurologic: A&Ox4  Neuropsychiatric: calm, cooperative, pleasant    Consultations This Hospital Stay   NEPHROLOGY KIDNEY/PANCREAS TRANSPLANT ADULT IP CONSULT  CARE MANAGEMENT / SOCIAL WORK IP CONSULT  ENT IP CONSULT  NURSING TO CONSULT FOR VASCULAR ACCESS CARE IP CONSULT  NEUROLOGY GENERAL ADULT IP CONSULT  PSYCHIATRY IP CONSULT    Time Spent on this Encounter   I have spent greater than 30 minutes on this discharge.    Discharge Orders   Discharge Medications   Current Discharge Medication List        START taking these medications    Details   acetaminophen (TYLENOL) 325 MG tablet Take 3 tablets (975 mg) by mouth every 8 hours as needed for mild pain Try first before Tramadol.    Associated Diagnoses: Chronic pain of both shoulders      !! cycloSPORINE modified (GENERIC EQUIVALENT) 100 MG capsule  Take 2 capsules (200 mg) by mouth 2 times daily Total dose = 275 mg BID.  Qty: 120 capsule, Refills: 11    Associated Diagnoses: S/P liver transplant (H)      !! cycloSPORINE modified (GENERIC EQUIVALENT) 25 MG capsule Take 3 capsules (75 mg) by mouth 2 times daily Total dose = 275 mg BID  Qty: 180 capsule, Refills: 11    Associated Diagnoses: S/P liver transplant (H)      dapsone (ACZONE) 25 MG tablet Take 2 tablets (50 mg) by mouth daily for 66 days Medication complete when pills run out.  Qty: 132 tablet, Refills: 0    Associated Diagnoses: S/P liver transplant (H)      fludrocortisone (FLORINEF) 0.1 MG tablet Take 1 tablet (0.1 mg) by mouth three times a week  Qty: 30 tablet, Refills: 0    Associated Diagnoses: Hyperkalemia      magnesium oxide (MAG-OX) 400 MG tablet Take 2 tablets (800 mg) by mouth 2 times daily  Qty: 120 tablet, Refills: 2    Associated Diagnoses: Hypomagnesemia      melatonin 5 MG tablet Take 1 tablet (5 mg) by mouth daily (with dinner) Take daily at dusk.  Qty: 30 tablet, Refills: 2    Associated Diagnoses: Encephalopathy      sodium zirconium cyclosilicate (LOKELMA) 10 g PACK packet Take 1 packet (10 g) by mouth daily as needed (Hyperkalemia)  Qty: 30 each, Refills: 0    Associated Diagnoses: Hyperkalemia       !! - Potential duplicate medications found. Please discuss with provider.        CONTINUE these medications which have CHANGED    Details   QUEtiapine (SEROQUEL) 25 MG tablet Take 1-2 tablets (25-50 mg) by mouth nightly as needed (Sleep)    Associated Diagnoses: Liver replaced by transplant (H); Persistent insomnia           CONTINUE these medications which have NOT CHANGED    Details   aspirin (ASA) 325 MG tablet 1 tablet (325 mg) by Oral or Feeding Tube route daily for 84 days  Qty: 60 tablet, Refills: 1    Associated Diagnoses: Liver replaced by transplant (H)      empagliflozin (JARDIANCE) 25 MG TABS tablet Take 1 tablet (25 mg) by mouth daily  Qty: 90 tablet, Refills: 1     Associated Diagnoses: Type 2 diabetes mellitus with hyperglycemia, with long-term current use of insulin (H)      Glucagon (GVOKE HYPOPEN) 1 MG/0.2ML pen Inject the contents of 1 device under the skin into lower abdomen, outer thigh, or outer upper arm as needed for hypoglycemia. If no response after 15 minutes, additional 1 mg dose from a new device may be injected while waiting for emergency assistance.  Qty: 0.4 mL, Refills: 1    Comments: Replaces BAQSIMI per insurance formulary  Associated Diagnoses: Type 2 diabetes mellitus without complication, with long-term current use of insulin (H)      insulin degludec (TRESIBA FLEXTOUCH) 100 UNIT/ML pen Inject 26 Units Subcutaneous every morning  Qty: 15 mL, Refills: 5    Associated Diagnoses: Liver replaced by transplant (H)      Insulin Lispro-aabc, 1 U Dial, (LYUMJEV KWIKPEN) 100 UNIT/ML SOPN Inject 6 Units Subcutaneous 3 times daily (with meals) 6 units with meals, plus correction. Pt may use up to 30 units daily. This REPLACES Humalog/Novolog insulin.  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)      multivitamin w/minerals (THERA-VIT-M) tablet TAKE 1 TABLET BY MOUTH DAILY  Qty: 90 tablet, Refills: 1    Associated Diagnoses: Alcoholic cirrhosis of liver without ascites (H); Alcoholic hepatitis without ascites (H28)      mycophenolic acid (GENERIC EQUIVALENT) 180 MG EC tablet Take 3 tablets (540 mg) by mouth 2 times daily  Qty: 540 tablet, Refills: 0    Comments: TXP DT 3/5/2024 (Liver) TXP Dischg DT 3/21/2024 DX Liver replaced by transplant Z94.4 TX [facility] ([place], [place])  Associated Diagnoses: Liver replaced by transplant (H)      omeprazole (PRILOSEC) 20 MG DR capsule Take 20 mg by mouth daily  Qty: 180 capsule, Refills: 1    Associated Diagnoses: Secondary esophageal 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 (H)      traMADol (ULTRAM) 50 MG tablet Take 1 tablet (50 mg)  by mouth daily as needed for severe pain  Qty: 30 tablet, Refills: 0    Associated Diagnoses: Liver replaced by transplant (H)      ursodiol (ACTIGALL) 300 MG capsule Take 1 capsule (300 mg) by mouth 2 times daily  Qty: 180 capsule, Refills: 3    Associated Diagnoses: Liver transplant candidate      blood glucose (NO BRAND SPECIFIED) test strip Use to test blood sugar two times daily or as directed. To accompany: Blood Glucose Monitor Brands: per insurance.  Qty: 100 strip, Refills: 6    Associated Diagnoses: Type 2 diabetes mellitus without complication, without long-term current use of insulin (H)      blood glucose monitoring (NO BRAND SPECIFIED) meter device kit Use to test blood sugar twice times daily or as directed. Preferred blood glucose meter OR supplies to accompany: Blood Glucose Monitor Brands: per insurance.  Qty: 1 kit, Refills: 0    Associated Diagnoses: Type 2 diabetes mellitus without complication, without long-term current use of insulin (H)      !! Continuous Blood Gluc Sensor (DEXCOM G7 SENSOR) MISC Change every 10 days.  Qty: 3 each, Refills: 5    Associated Diagnoses: Liver replaced by transplant (H)      !! Continuous Blood Gluc Sensor (DEXCOM G7 SENSOR) MISC Change every 10 days.  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  Qty: 10 each, Refills: 0    Associated Diagnoses: Type 2 diabetes mellitus without complication, with long-term current use of insulin (H)      !! insulin pen needle (29G X 12.7MM) 29G X 12.7MM miscellaneous Use 1 pen needle every three days or as directed.  Qty: 90 each, Refills: 1    Associated Diagnoses: Liver replaced by transplant  (H)      !! insulin pen needle (32G X 4 MM) 32G X 4 MM miscellaneous Use as directed by provider Per insurance coverage  Qty: 200 each, Refills: 1    Associated Diagnoses: Liver replaced by transplant (H)      !! insulin pen needle (BD PEN NEEDLE SHERRIE 2ND GEN) 32G X 4 MM miscellaneous USES 5 PER DAY  Qty: 100 each, Refills: 11    Associated Diagnoses: Type 2 diabetes mellitus with hyperosmolarity without coma, with long-term current use of insulin (H)      thin (NO BRAND SPECIFIED) lancets Use with lanceting device. To accompany: Blood Glucose Monitor Brands: per insurance.  Qty: 100 each, Refills: 6    Associated Diagnoses: Type 2 diabetes mellitus without complication, without long-term current use of insulin (H)       !! - Potential duplicate medications found. Please discuss with provider.        STOP taking these medications       sulfamethoxazole-trimethoprim (BACTRIM) 400-80 MG tablet Comments:   Reason for Stopping:         tacrolimus (GENERIC EQUIVALENT) 5 MG capsule Comments:   Reason for Stopping:                  Reason for your hospital stay    You were admitted for an acute kidney injury, high potassium levels, and altered mental status. These are resolving and you are discharging home in stable condition with close follow up.     Activity    Your activity upon discharge: Activity encouraged as tolerated.     Monitor and record    Monitor glucose 4 times per day.     When to contact your care team    WHEN TO CONTACT YOUR  COORDINATOR:     Transplant Coordinator [phone]     Notify your coordinator if you have pain over your liver, increased redness or drainage from your incision, fever greater than 100F, or yellowing of skin or eyes.     Notify your coordinator immediately if you are ever unable to take your immunosuppressive medications for any reason.     If you have URGENT concerns after office hours, please call the hospital switchboard at [phone] and ask to have the organ transplant nurse  on-call paged. If you have a life-threatening emergency, go to the nearest emergency room.     Adult [facility] Follow-up and recommended labs and tests    [facility] FOLLOW UP:     Follow up with Dr. Cortez in 1-2 weeks.     Follow up with Transplant Nephrology in 4-6 weeks.     LABS:     CBC, BMP, magnesium, phosphorus, hepatic panel, cyclosporine levels every Monday and Thursday until stable.     Appointments on [place] and/or [facility] (with [facility] or [facility] provider or service). Call [phone] if you haven't heard regarding these appointments within 7 days of discharge.     Diet    Diet recommendations post-transplant: Heart healthy dietary habits long term (low saturated/trans fat, low sodium). Practice food safety precautions.         Data   Most Recent 3 CBC's:  Recent Labs   Lab Test 06/27/24  0643 06/26/24  0623 06/25/24  0644   WBC 9.1 7.4 6.5   HGB 11.6* 11.9* 12.3*   MCV 88 88 87    154 151      Most Recent 3 BMP's:  Recent Labs   Lab Test 06/27/24  1231 06/27/24  0748 06/27/24  0643 06/26/24  0749 06/26/24  0623 06/26/24  0158 06/26/24  0042   NA  --   --  138  --  137  --  137   POTASSIUM  --   --  4.0  --  3.8  --  3.7   CHLORIDE  --   --  106  --  103  --  103   CO2  --   --  23  --  21*  --  22   BUN  --   --  29.9*  --  30.4*  --  30.8*   CR  --   --  1.45*  --  1.32*  --  1.45*   ANIONGAP  --   --  9  --  13  --  12   MEG  --   --  9.4  --  9.4  --  9.5   * 125* 123*   < > 198*   < > 209*    < > = values in this interval not displayed.     Most Recent 2 LFT's:  Recent Labs   Lab Test 06/27/24  0643 06/26/24  0623   AST 29 29   ALT 16 18   ALKPHOS 79 84   BILITOTAL 0.5 0.5     Most Recent INR's and Anticoagulation Dosing History:  Anticoagulation Dose History  More data exists         Latest Ref Rng & Units 3/6/2024 3/7/2024 3/8/2024 3/9/2024 3/10/2024 3/11/2024 3/31/2024   Recent Dosing and Labs   INR 0.85 - 1.15 1.29  1.41  1.59  1.57  1.60  1.28  1.32  1.25   1.30  1.18  1.13       Details          Multiple values from one day are sorted in reverse-chronological order             Most Recent 3 Troponin's:No lab results found.  Most Recent Cholesterol Panel:  Recent Labs   Lab Test 03/12/22  1006   CHOL 203*   *   HDL 71   TRIG 151*     Most Recent 6 Bacteria Isolates From Any Culture (See EPIC Reports for Culture Details):No lab results found.  Most Recent TSH, T4 and A1c Labs:  Recent Labs   Lab Test 06/21/24  1013 03/12/22  1007 03/12/22  1006   TSH  --   --  2.18   A1C 7.3*   < >  --     < > = values in this interval not displayed.     Results for orders placed or performed during the hospital encounter of 06/22/24   MR Brain w/o & w Contrast    Narrative    MR BRAIN W/O & W CONTRAST 6/24/2024 10:02 AM    Provided History: Confusion in the setting of high tac levels; concern  for PRES/other etiologies    Comparison:  none available      Technique: Sagittal T1-weighted, coronal T2-weighted, axial T2 FLAIR,  axial susceptibility weighted, and axial diffusion-weighted with ADC  map images of the brain were obtained without intravenous contrast.  Following intravenous gadolinium-based contrast administration, axial  T2-weighted, diffusion, and T1-weighted images (in multiple planes)  were obtained.    Findings:     No mass effect, midline shift or evidence of intracranial hemorrhage.  There is a moderate diffuse cerebral parenchymal volume loss with  expansion of the supratentorial ventricles and cerebral sulci. Small  faint areas of abnormal T2 hyperintense signal within the right and  left occipital lobe (series 5, images 11 and 12). No abnormal  intracranial enhancement. The globes, orbits, paranasal sinuses and  mastoid air cells are clear.    Right asymmetric mucosal thickening at the nasopharynx (series 6 image  4). Direct visualization is recommended.      Impression    Impression:   1. No acute intracranial pathology.  2. No imaging finding to suggest PRES  or explain patient's confusion.  Faint, T2 hyperintense signal within the periventricular white matter  of the occipital lobes is nonspecific.    3. Right asymmetric mucosal thickening at the nasopharynx. Direct  visualization is recommended.    I have personally reviewed the examination and initial interpretation  and I agree with the findings.    REINA HWANG MD         SYSTEM ID:  [id number]   CT Head w/o Contrast    Narrative    EXAM: CT HEAD W/O CONTRAST  6/26/2024 2:26 AM     HISTORY: Patient hit head after running into the window.       COMPARISON: MRI 6/24/2024    TECHNIQUE: Using multidetector thin collimation helical acquisition  technique, axial, coronal and sagittal CT images from the skull base  to the vertex were obtained without intravenous contrast.   (topogram) image(s) also obtained and reviewed.    FINDINGS:  No acute intracranial hemorrhage, mass effect, or midline shift. No  acute loss of gray-white matter differentiation in the cerebral  hemispheres. Moderate cerebral parenchymal volume loss. Ventricles are  proportionate to the cerebral sulci. Clear basal cisterns.    The bony calvaria and the bones of the skull base are normal. The  visualized portions of the paranasal sinuses and mastoid air cells are  clear. Grossly normal orbits.       Impression    IMPRESSION: No acute intracranial pathology.     I have personally reviewed the examination and initial interpretation  and I agree with the findings.    PARISH FLANAGAN MD         SYSTEM ID:  [id number]     I have reviewed history, examined patient and discussed plan with the fellow/resident/JOSELYN.    I concur with the findings in this note.    Time spent on discharge activities: 45 minutes.

## 2024-06-27 NOTE — PLAN OF CARE
VS: BP 99/54 (BP Location: Left arm)   Pulse 89   Temp 98.5  F (36.9  C) (Oral)   Resp 18   Wt 81.7 kg (180 lb 1.6 oz)   SpO2 96%   BMI 26.98 kg/m      Cares: 1900 - 0730     Neuro: pt has been Aox4 throughout this shift. Pleasant and cooperative with cares.   VS: VSS   Cardiac: on telemetry, afebrile. Softer pressures /50s  Respiratory: WDL on RA, denies SOB.   GI/: voiding adequately w/out issues, LBM 6/26   Skin: scattered bruising/abrasions on bilateral upper extremites, Left flank bruised (hematoma) marked with skin marker, right top of head - brian size growth (pt reported he had it removed before and it is now growing back)   Diet: 2gm K+ (carb count), encourage fluids   Labs: awaiting AM lab results   BG: ACHS + 2am (177, 150)  LDA: Right PIV SL   Mobility: UAL   Pain/Nausea: denies pain or nausea   PRN medications: none given   Plan of Care: continue with current POC and update MD with any changes.

## 2024-06-27 NOTE — PLAN OF CARE
Goal Outcome Evaluation:      Plan of Care Reviewed With: patient    Overall Patient Progress: improvingOverall Patient Progress: improving  BP (!) 127/93   Pulse 86   Temp 98.4  F (36.9  C)   Resp 16   Wt 81.7 kg (180 lb 1.6 oz)   SpO2 99%   BMI 26.98 kg/m      Shift: [id number]  Isolation Status: Contact  VSS on RA, afebrile  Neuro: Aox4  Behaviors: pleasant & cooperative  B  Labs: creatinine 1.45, stable labs. Mag IV given for mag 1.5  Respiratory: WNL  Cardiac: WNL  Pain/Nausea: Denies  PRN: NA  Diet: Regular 2gm K+  IV Access: PIV  Infusion(s): NA  Lines/Drains:NA  GI/: voiding, LBM   Skin: L flank bruising,unknown origin per pt, approx 1x1 cm dry area/scab on scalp.  Mobility: UAL  Events/Education: discharge education, pt verbalizes understanding.  Plan: POC, update team PRN. Discharge to home today.

## 2024-06-27 NOTE — PROGRESS NOTES
[facility]   Transplant Nephrology Progress Note  Date of Admission:  6/22/2024  Today's Date: 06/27/2024    Recommendations:  - sodium zirconium cyclosilicate (Lokelma) PRN outpatient  - Continue fludrocortisone MWF.  - Agree with magnesium sulfate 2 g IV once.  - Follow-up with transplant nephrology in 4-6 weeks.    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  Transaminases stable. Followed by transplant surgery     # Immunosuppression Prior to Admission: Tacrolimus immediate release (goal 8-10) and Mycophenolic acid (dose 540 mg every 12 hours)   - Present Immunosuppression: Cyclosporine (goal per transplant surgery) and Mycophenolic acid (dose 540 mg every 12 hours)   - Patient is in an immunosuppressed state and will continue to monitor for efficacy and toxicity of immunosuppression medications.   - Changes: Not at this time.  Changed from tacrolimus to cyclosporine d/t AMS.    # Infection Prophylaxis:   - PJP: Dapsone- switched from bactrim due to critical hyperK (nl G6PD)  - CMV: Valganciclovir (Valcyte)    # Encephalopathy:   - MRI brain neg PRES   - infectious w/up neg so far   - lower FK goals per transplant surgery, consider FK-->CSA switch if no improvement   - 6/26/24 CT Head: No acute intracranial pathology.     # Hypertension: Controlled;  Goal BP: < 140/90 (Hospitalization goal)   - Changes: Not at this time continue fllorinef 0.1 mg MWF for hyperK    # Diabetes: Controlled (HbA1c <7%) Last HbA1c: 4.8%    # Anemia in Chronic Renal Disease: Hgb: Stable      MAITE: No     # Mineral Bone Disorder:   - Vitamin D; level: Not checked recently        On supplement: No  - Calcium; level: Normal        On supplement: No  - Phosphorus; level: Normal        On  supplement: No    # Electrolytes:   - Potassium; level: Normal         On binder: Yes, sodium zirconium cyclosilicate (Lokelma) 10 g three times weekly  --> change to PRN outpatient  - Magnesium; level: Low          On supplement: Yes, magnesium oxide 800 mg PO daily. Agree with magnesium sulfate 2 g IV once.  - Bicarbonate; level: Normal         On supplement: No  - Sodium; level: Normal      # Transplant History:  Etiology of Kidney Failure: ANGEL  Tx: Liver Tx  Transplant: 3/5/2024 (Liver)  Crossmatch at time of Tx: negative  DSA at time of Tx: No  Significant changes in immunosuppression: None  Significant transplant-related complications: None       Recommendations were communicated to the primary team verbally.    Seen and discussed with .      LESA Camejo CNP   Pager: [phone]        Physician Attestation     I saw and evaluated Mary Lopez as part of a shared APRN/PA visit.     I personally reviewed the vital signs, medications, labs, and imaging.    I personally provided a substantive portion of care for this patient and I approve the care plan as written by the JOSELYN.  I was involved with Medical Decision Making including: Please see A&P for additional details of medical decision making.  MANAGEMENT DISCUSSED with the following over the past 24 hours: mental status improved now on CSA, Cr stable still above baseline, K stable on florinef, will use lokelam prn for K>-5.5. will monitor labs twice weekly as OP and f/up in Tx nephrology clinic 4-6 weeks     Veronica Cabral MD  Date of Service (when I saw the patient): 06/27/24    Interval History   Mr. Lopez's creatinine is 1.45 (06/27 0643); Increased from 1.32 yesterday.  Estimated Creatinine Clearance: 68.9 mL/min (A) (based on SCr of 1.45 mg/dL (H)).   I/O last 3 completed shifts:  In: 1375 [P.O.:1365; I.V.:10]  Out: -    Other significant labs/tests/vitals: SBP 90s - 110s overnight. Afebrile   - 6/26/24 CT Head: No acute  intracranial pathology.     No acute events overnight. Pt no longer seems confused.  Denies HA.  No chest pain or shortness of breath.  No leg swelling.   Bowel movements are soft.        Review of Systems   4 point ROS was obtained and negative except as noted in the Interval History.    MEDICATIONS:  Current Facility-Administered Medications   Medication Dose Route Frequency Provider Last Rate Last Admin    cycloSPORINE modified (GENERIC EQUIVALENT) capsule 275 mg  275 mg Oral BID IS Ryder Cortez MD   275 mg at 06/27/24 0751    dapsone (ACZONE) tablet 50 mg  50 mg Oral Daily Fabi Aguirre NP   50 mg at 06/27/24 0750    empagliflozin (JARDIANCE) tablet 25 mg  25 mg Oral Daily Eugenie Pascal APRN CNP   25 mg at 06/27/24 0750    fludrocortisone (FLORINEF) tablet 0.1 mg  0.1 mg Oral Once per day on Monday Wednesday Friday Fabi Aguirre NP   0.1 mg at 06/26/24 0817    insulin aspart (NovoLOG) injection (RAPID ACTING)  1-7 Units Subcutaneous TID AC Fabi Aguirre NP   2 Units at 06/26/24 1803    insulin aspart (NovoLOG) injection (RAPID ACTING)  1-5 Units Subcutaneous At Bedtime Fabi Aguirre NP        insulin aspart (NovoLOG) injection (RAPID ACTING)   Subcutaneous TID w/meals Fabi Aguirre NP   2 Units at 06/26/24 1803    insulin degludec (TRESIBA) 100 UNIT/ML injection 13 Units  13 Units Subcutaneous QAM Fabi Aguirre NP   13 Units at 06/27/24 0750    magnesium oxide (MAG-OX) tablet 800 mg  800 mg Oral Daily Eugenie Pascal APRN CNP   800 mg at 06/27/24 0750    melatonin tablet 5 mg  5 mg Oral Daily with Susu Luna PA-C   5 mg at 06/26/24 1756    mycophenolic acid (GENERIC EQUIVALENT) EC tablet 540 mg  540 mg Oral BID Fabi Aguirre NP   540 mg at 06/27/24 0749    pantoprazole (PROTONIX) EC tablet 40 mg  40 mg Oral Daily Ryder Cortez MD   40 mg at 06/27/24 0751    polyethylene glycol (MIRALAX) Packet 17 g  17 g Oral  Daily Fabi Aguirre NP   17 g at 24 0747    QUEtiapine (SEROquel) half-tab 12.5-25 mg  12.5-25 mg Oral QPM Eugenie Pascal APRN CNP   12.5 mg at 24 1854    senna-docusate (SENOKOT-S/PERICOLACE) 8.6-50 MG per tablet 1-2 tablet  1-2 tablet Oral BID Fabi Aguirre NP   1 tablet at 24 0751    sodium chloride (PF) 0.9% PF flush 3 mL  3 mL Intracatheter Q8H Marlee Chinchilla MD   3 mL at 24    sodium zirconium cyclosilicate (LOKELMA) packet 10 g  10 g Oral Once per day on  Fabi Aguirre NP   10 g at 24 0749    ursodiol (ACTIGALL) capsule 300 mg  300 mg Oral BID Fabi Aguirre NP   300 mg at 24 0750     Current Facility-Administered Medications   Medication Dose Route Frequency Provider Last Rate Last Admin       Physical Exam   Temp  Av.2  F (36.8  C)  Min: 97.5  F (36.4  C)  Max: 98.8  F (37.1  C)      Pulse  Av.7  Min: 59  Max: 158 Resp  Av.3  Min: 15  Max: 18  SpO2  Av.8 %  Min: 90 %  Max: 100 %     BP 99/54 (BP Location: Left arm)   Pulse 89   Temp 98.5  F (36.9  C) (Oral)   Resp 18   Wt 81.7 kg (180 lb 1.6 oz)   SpO2 96%   BMI 26.98 kg/m      Admit Weight: 81.7 kg (180 lb 1.6 oz)     GENERAL APPEARANCE: awake, no distress  RESP: no increased WOB  CV: RRR  EDEMA: no LE edema bilaterally  ABDOMEN: soft, nondistended, nontender  MS: extremities normal - no gross deformities noted, no evidence of inflammation in joints, no muscle tenderness  SKIN: no rash  Neuro: a/o x 3    Data   All labs reviewed by me.  CMP  Recent Labs   Lab 24  0748 24  0643 24  0205 24  2206 24  0749 24  0623 24  0158 24  0042 24  0759 24  0644 24  0814 24   NA  --  138  --   --   --  137  --  137  --  138  --  137   POTASSIUM  --  4.0  --   --   --  3.8  --  3.7  --  3.8  --  4.3   CHLORIDE  --  106  --   --   --  103  --  103  --  103  --  103    CO2  --  23  --   --   --  21*  --  22  --  23  --  24   ANIONGAP  --  9  --   --   --  13  --  12  --  12  --  10   * 123* 150* 177*   < > 198*   < > 209*   < > 105*   < > 137*   BUN  --  29.9*  --   --   --  30.4*  --  30.8*  --  28.7*  --  36.3*   CR  --  1.45*  --   --   --  1.32*  --  1.45*  --  1.21*  --  1.42*   GFRESTIMATED  --  58*  --   --   --  65  --  58*  --  72  --  59*   MEG  --  9.4  --   --   --  9.4  --  9.5  --  9.7  --  9.8   MAG  --  1.4*  --   --   --  1.5*  --   --   --  1.6*  --  1.3*   PHOS  --  4.0  --   --   --  3.4  --   --   --  3.2  --  3.5   PROTTOTAL  --  6.3*  --   --   --  6.4  --   --   --  6.5  --  6.9   ALBUMIN  --  3.7  --   --   --  3.9  --   --   --  4.0  --  4.1   BILITOTAL  --  0.5  --   --   --  0.5  --   --   --  0.5  --  0.5   ALKPHOS  --  79  --   --   --  84  --   --   --  84  --  86   AST  --  29  --   --   --  29  --   --   --  25  --  26   ALT  --  16  --   --   --  18  --   --   --  17  --  18    < > = values in this interval not displayed.     CBC  Recent Labs   Lab 06/27/24 0643 06/26/24 0623 06/25/24 0644 06/24/24 0624   HGB 11.6* 11.9* 12.3* 12.4*   WBC 9.1 7.4 6.5 6.3   RBC 3.69* 3.93* 3.95* 3.97*   HCT 32.3* 34.6* 34.2* 35.4*   MCV 88 88 87 89   MCH 31.4 30.3 31.1 31.2   MCHC 35.9 34.4 36.0 35.0   RDW 13.1 12.7 12.8 12.9    154 151 149*     INRNo lab results found in last 7 days.  ABGNo lab results found in last 7 days.   Urine Studies  Recent Labs   Lab Test 06/24/24  1654 06/21/24  1013 03/30/24  2248 03/04/24  1042 02/15/24  2015 11/09/23  1147   COLOR Light Yellow Yellow Yellow Yellow   < > Yellow   APPEARANCE Clear Clear Clear Clear   < > Clear   URINEGLC >=1000* 500* Negative 500*   < > >=1000*   URINEBILI Negative Small* Negative Negative   < > Small*   URINEKETONE 10* Negative Negative Negative   < > Negative   SG 1.022 1.025 1.021 1.007   < > <=1.005   UBLD Negative Negative Negative Negative   < > Negative   URINEPH 5.5 5.0 5.0  5.0   < > 6.0   PROTEIN Negative Negative Negative Negative   < > Negative   UROBILINOGEN  --  0.2  --   --   --  2.0*   NITRITE Negative Negative Negative Negative   < > Negative   LEUKEST Negative Negative Negative Negative   < > Negative   RBCU 0 0-2 0 <1   < > 0-2   WBCU 0 0-5 1 <1   < > 0-5    < > = values in this interval not displayed.     No lab results found.  PTH  No lab results found.  Iron Studies  Recent Labs   Lab Test 12/19/23  0758 08/23/23  1018 08/09/23  1122 07/15/23  1048   IRON 135 120 118 92   KE 2,948* 4,261* 4,611*  --        IMAGING:  All imaging studies reviewed by me.

## 2024-06-28 ENCOUNTER — TELEPHONE (OUTPATIENT)
Dept: TRANSPLANT | Facility: CLINIC | Age: 54
End: 2024-06-28
Payer: COMMERCIAL

## 2024-06-28 DIAGNOSIS — Z94.4 LIVER REPLACED BY TRANSPLANT (H): Primary | ICD-10-CM

## 2024-06-28 LAB
BACTERIA BLD CULT: NO GROWTH
BACTERIA BLD CULT: NO GROWTH
VIT B12 SERPL-MCNC: 1272 PG/ML (ref 232–1245)

## 2024-06-28 NOTE — TELEPHONE ENCOUNTER
Call to Adina for discharge follow-up.  Adina feels that the seroquel he was prescribed while inpt is helping a lot.  He is eating well, up and around, has energy, is driving.  He was changed from tacro to CSA during this recent admissiondue to altered mentation / ANGEL / hyperkalemia. Adina is confident that Mary's PCP, Dr. Hoyt will manage this.  He currently has no additional neuro follow-up.    Mary is scheduled to see both Dr. Cortez and Dr. Muniz on July 2. I told Adina that this is not necessary but she wants to keep both appt's.   He is scheduled to have an abdominal film on 7/2 to assess for presence of operative biliary stent.   Continues on mycophenolate due to change to CSA.

## 2024-07-01 ENCOUNTER — LAB (OUTPATIENT)
Dept: LAB | Facility: CLINIC | Age: 54
End: 2024-07-01
Payer: COMMERCIAL

## 2024-07-01 ENCOUNTER — TELEPHONE (OUTPATIENT)
Dept: TRANSPLANT | Facility: CLINIC | Age: 54
End: 2024-07-01

## 2024-07-01 DIAGNOSIS — Z94.4 LIVER REPLACED BY TRANSPLANT (H): ICD-10-CM

## 2024-07-01 DIAGNOSIS — E11.9 TYPE 2 DIABETES MELLITUS WITHOUT COMPLICATION, WITH LONG-TERM CURRENT USE OF INSULIN (H): ICD-10-CM

## 2024-07-01 DIAGNOSIS — Z94.4 LIVER REPLACED BY TRANSPLANT (H): Primary | Chronic | ICD-10-CM

## 2024-07-01 DIAGNOSIS — Z79.4 TYPE 2 DIABETES MELLITUS WITHOUT COMPLICATION, WITH LONG-TERM CURRENT USE OF INSULIN (H): ICD-10-CM

## 2024-07-01 LAB
ALBUMIN SERPL BCG-MCNC: 4 G/DL (ref 3.5–5.2)
ALP SERPL-CCNC: 91 U/L (ref 40–150)
ALT SERPL W P-5'-P-CCNC: 18 U/L (ref 0–70)
ANION GAP SERPL CALCULATED.3IONS-SCNC: 8 MMOL/L (ref 7–15)
AST SERPL W P-5'-P-CCNC: 26 U/L (ref 0–45)
BILIRUB DIRECT SERPL-MCNC: 0.31 MG/DL (ref 0–0.3)
BILIRUB SERPL-MCNC: 0.7 MG/DL
BUN SERPL-MCNC: 24.7 MG/DL (ref 6–20)
CALCIUM SERPL-MCNC: 9.6 MG/DL (ref 8.6–10)
CHLORIDE SERPL-SCNC: 107 MMOL/L (ref 98–107)
CREAT SERPL-MCNC: 1.24 MG/DL (ref 0.67–1.17)
CYCLOSPORINE BLD LC/MS/MS-MCNC: 207 UG/L (ref 50–400)
DEPRECATED HCO3 PLAS-SCNC: 25 MMOL/L (ref 22–29)
EGFRCR SERPLBLD CKD-EPI 2021: 70 ML/MIN/1.73M2
ERYTHROCYTE [DISTWIDTH] IN BLOOD BY AUTOMATED COUNT: 13.1 % (ref 10–15)
GLUCOSE SERPL-MCNC: 148 MG/DL (ref 70–99)
HCT VFR BLD AUTO: 32.6 % (ref 40–53)
HGB BLD-MCNC: 11.3 G/DL (ref 13.3–17.7)
MAGNESIUM SERPL-MCNC: 2 MG/DL (ref 1.7–2.3)
MCH RBC QN AUTO: 30.1 PG (ref 26.5–33)
MCHC RBC AUTO-ENTMCNC: 34.7 G/DL (ref 31.5–36.5)
MCV RBC AUTO: 87 FL (ref 78–100)
PHOSPHATE SERPL-MCNC: 3.8 MG/DL (ref 2.5–4.5)
PLATELET # BLD AUTO: 197 10E3/UL (ref 150–450)
POTASSIUM SERPL-SCNC: 4 MMOL/L (ref 3.4–5.3)
PROT SERPL-MCNC: 6.5 G/DL (ref 6.4–8.3)
RBC # BLD AUTO: 3.75 10E6/UL (ref 4.4–5.9)
SODIUM SERPL-SCNC: 140 MMOL/L (ref 135–145)
TME LAST DOSE: NORMAL H
TME LAST DOSE: NORMAL H
WBC # BLD AUTO: 8.6 10E3/UL (ref 4–11)

## 2024-07-01 PROCEDURE — 36415 COLL VENOUS BLD VENIPUNCTURE: CPT

## 2024-07-01 PROCEDURE — 80053 COMPREHEN METABOLIC PANEL: CPT

## 2024-07-01 PROCEDURE — 80158 DRUG ASSAY CYCLOSPORINE: CPT

## 2024-07-01 PROCEDURE — 85027 COMPLETE CBC AUTOMATED: CPT

## 2024-07-01 PROCEDURE — 99358 PROLONG SERVICE W/O CONTACT: CPT | Performed by: INTERNAL MEDICINE

## 2024-07-01 PROCEDURE — 82248 BILIRUBIN DIRECT: CPT

## 2024-07-01 PROCEDURE — 83735 ASSAY OF MAGNESIUM: CPT

## 2024-07-01 PROCEDURE — 80061 LIPID PANEL: CPT

## 2024-07-01 PROCEDURE — 84100 ASSAY OF PHOSPHORUS: CPT

## 2024-07-01 NOTE — TELEPHONE ENCOUNTER
"Call to [facility]ina to review lab schedule. I spoke to Mary.  \"Since I left that hospital last time I am invigorated\"  He has been sleeping better.      "
ambulatory

## 2024-07-02 ENCOUNTER — OFFICE VISIT (OUTPATIENT)
Dept: GASTROENTEROLOGY | Facility: CLINIC | Age: 54
End: 2024-07-02
Attending: INTERNAL MEDICINE
Payer: COMMERCIAL

## 2024-07-02 ENCOUNTER — MYC MEDICAL ADVICE (OUTPATIENT)
Dept: FAMILY MEDICINE | Facility: CLINIC | Age: 54
End: 2024-07-02

## 2024-07-02 ENCOUNTER — ALLIED HEALTH/NURSE VISIT (OUTPATIENT)
Dept: EDUCATION SERVICES | Facility: CLINIC | Age: 54
End: 2024-07-02
Payer: COMMERCIAL

## 2024-07-02 ENCOUNTER — ANCILLARY PROCEDURE (OUTPATIENT)
Dept: GENERAL RADIOLOGY | Facility: CLINIC | Age: 54
End: 2024-07-02
Attending: TRANSPLANT SURGERY
Payer: COMMERCIAL

## 2024-07-02 ENCOUNTER — TELEPHONE (OUTPATIENT)
Dept: TRANSPLANT | Facility: CLINIC | Age: 54
End: 2024-07-02

## 2024-07-02 ENCOUNTER — OFFICE VISIT (OUTPATIENT)
Dept: TRANSPLANT | Facility: CLINIC | Age: 54
End: 2024-07-02
Attending: TRANSPLANT SURGERY
Payer: COMMERCIAL

## 2024-07-02 VITALS — SYSTOLIC BLOOD PRESSURE: 147 MMHG | DIASTOLIC BLOOD PRESSURE: 90 MMHG | WEIGHT: 190.7 LBS | BODY MASS INDEX: 28.57 KG/M2

## 2024-07-02 VITALS
DIASTOLIC BLOOD PRESSURE: 89 MMHG | SYSTOLIC BLOOD PRESSURE: 138 MMHG | BODY MASS INDEX: 28.21 KG/M2 | HEART RATE: 83 BPM | OXYGEN SATURATION: 99 % | WEIGHT: 188.3 LBS

## 2024-07-02 DIAGNOSIS — Z79.4 TYPE 2 DIABETES MELLITUS WITHOUT COMPLICATION, WITH LONG-TERM CURRENT USE OF INSULIN (H): Primary | ICD-10-CM

## 2024-07-02 DIAGNOSIS — Z94.4 LIVER REPLACED BY TRANSPLANT (H): ICD-10-CM

## 2024-07-02 DIAGNOSIS — Z94.4 S/P LIVER TRANSPLANT (H): ICD-10-CM

## 2024-07-02 DIAGNOSIS — Z94.4 LIVER REPLACED BY TRANSPLANT (H): Primary | ICD-10-CM

## 2024-07-02 DIAGNOSIS — Z94.4 LIVER REPLACED BY TRANSPLANT (H): Chronic | ICD-10-CM

## 2024-07-02 DIAGNOSIS — Z79.4 TYPE 2 DIABETES MELLITUS WITHOUT COMPLICATION, WITH LONG-TERM CURRENT USE OF INSULIN (H): Primary | Chronic | ICD-10-CM

## 2024-07-02 DIAGNOSIS — E11.9 TYPE 2 DIABETES MELLITUS WITHOUT COMPLICATION, WITH LONG-TERM CURRENT USE OF INSULIN (H): Primary | ICD-10-CM

## 2024-07-02 DIAGNOSIS — E11.9 TYPE 2 DIABETES MELLITUS WITHOUT COMPLICATION, WITH LONG-TERM CURRENT USE OF INSULIN (H): Primary | Chronic | ICD-10-CM

## 2024-07-02 DIAGNOSIS — Z46.89 ENCOUNTER FOR REMOVAL OF BILIARY STENT: Primary | ICD-10-CM

## 2024-07-02 DIAGNOSIS — E83.42 HYPOMAGNESEMIA: Primary | ICD-10-CM

## 2024-07-02 LAB
CHOLEST SERPL-MCNC: 107 MG/DL
HDLC SERPL-MCNC: 46 MG/DL
LDLC SERPL CALC-MCNC: 45 MG/DL
NONHDLC SERPL-MCNC: 61 MG/DL
TRIGL SERPL-MCNC: 79 MG/DL

## 2024-07-02 PROCEDURE — 99215 OFFICE O/P EST HI 40 MIN: CPT | Performed by: INTERNAL MEDICINE

## 2024-07-02 PROCEDURE — 74019 RADEX ABDOMEN 2 VIEWS: CPT | Performed by: RADIOLOGY

## 2024-07-02 PROCEDURE — 99213 OFFICE O/P EST LOW 20 MIN: CPT | Mod: 27 | Performed by: INTERNAL MEDICINE

## 2024-07-02 PROCEDURE — 99211 OFF/OP EST MAY X REQ PHY/QHP: CPT | Performed by: TRANSPLANT SURGERY

## 2024-07-02 PROCEDURE — 99207 PR NO BILLABLE SERVICE THIS VISIT: CPT | Performed by: NUTRITIONIST

## 2024-07-02 PROCEDURE — G2211 COMPLEX E/M VISIT ADD ON: HCPCS | Performed by: INTERNAL MEDICINE

## 2024-07-02 PROCEDURE — 99213 OFFICE O/P EST LOW 20 MIN: CPT | Performed by: TRANSPLANT SURGERY

## 2024-07-02 RX ORDER — MYCOPHENOLIC ACID 180 MG/1
540 TABLET, DELAYED RELEASE ORAL 2 TIMES DAILY
Qty: 540 TABLET | Refills: 0 | Status: SHIPPED | OUTPATIENT
Start: 2024-07-02 | End: 2024-07-28

## 2024-07-02 RX ORDER — CYCLOSPORINE 100 MG/1
200 CAPSULE, LIQUID FILLED ORAL 2 TIMES DAILY
Qty: 120 CAPSULE | Refills: 11 | Status: SHIPPED | OUTPATIENT
Start: 2024-07-02 | End: 2024-07-09

## 2024-07-02 RX ORDER — ASPIRIN 325 MG
325 TABLET ORAL DAILY
Qty: 60 TABLET | Refills: 1 | Status: SHIPPED | OUTPATIENT
Start: 2024-07-02 | End: 2024-09-04

## 2024-07-02 RX ORDER — MAGNESIUM GLYCINATE 100 MG
200 TABLET ORAL 2 TIMES DAILY
Qty: 360 TABLET | Refills: 0 | Status: SHIPPED | OUTPATIENT
Start: 2024-07-02 | End: 2024-07-23

## 2024-07-02 RX ORDER — DAPSONE 25 MG/1
50 TABLET ORAL DAILY
Qty: 132 TABLET | Refills: 0 | Status: SHIPPED | OUTPATIENT
Start: 2024-07-02 | End: 2024-09-04

## 2024-07-02 RX ORDER — CYCLOSPORINE 25 MG/1
75 CAPSULE, LIQUID FILLED ORAL 2 TIMES DAILY
Qty: 180 CAPSULE | Refills: 11 | Status: SHIPPED | OUTPATIENT
Start: 2024-07-02 | End: 2024-07-09 | Stop reason: DRUGHIGH

## 2024-07-02 ASSESSMENT — PAIN SCALES - GENERAL: PAINLEVEL: NO PAIN (0)

## 2024-07-02 NOTE — LETTER
7/2/2024      Mary Anayarowan Lopez  [address] [place] [place] [address]      Dear Colleague,    Thank you for referring your patient, Mary Lopez, to the [facility]. Please see a copy of my visit note below.    HPI      ROS      Physical Exam    Transplant Surgery -OUTPATIENT IMMUNOSUPPRESSION PROGRESS NOTE    Date of Visit: 07/02/2024    Transplants:  3/5/2024 (Liver); Postoperative day:  119  ASSESMENT AND PLAN:  1.Graft Function:Liver allograft: no rejection or technical problems.    2.Immunosuppression Management:Cyclosporine, recommend levels of 150-200 ng/dL and cellcept for a total of 6  months post transplant  3.Hypertension:ok  4.Renal Function:better, would recommend seeing a nephrologist    5.Lab frequency: weekly  6.Other:    Biliary stent needs to be removed    Date: July 2, 2024    Transplant:  [x]                             Liver [x]                              Kidney []                             Pancreas []                              Other:             Chief Complaint:Follow Up (Liver transplant follow-up/)    Patient feels he is dramatically better after switch to cyclosporine from tacrolimus    History of Present Illness:  Patient Active Problem List   Diagnosis     Primary hypertension     Mild hyperlipidemia     Portal hypertensive gastropathy (H)     Secondary esophageal 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 (H)     Anemia due to unknown mechanism     Persistent insomnia     Bilateral leg edema     Alcohol use disorder, severe, in early remission (H)     Gastric varices     Severe malnutrition (H24)     Type 2 diabetes mellitus without complication, with long-term current use of insulin (H)     Generalized muscle weakness     Pulmonary HTN (H)     ANGEL (acute kidney injury) (H24)     Delirium     Liver replaced by transplant (H)     Immunosuppressed status (H24)     Liver transplant rejection (H)     Acute post-operative pain     Atrial fibrillation with RVR (H)      Hypomagnesemia     Unspecified abdominal pain     Ganglion cyst of wrist, right     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)     Hyperkalemia     S/P liver transplant (H)     Toxic metabolic encephalopathy     SOCIAL /FAMILY HISTORY: [x]                  No recent change    Past Medical History:   Diagnosis Date     ANGEL (acute kidney injury) (H24)      Alcoholic cirrhosis of liver without ascites (H) 07/13/2023     Alcoholic hepatitis with ascites (H28) 10/03/2023     Alcoholic hepatitis without ascites (H28) 07/13/2023     Closed fracture of one rib of left side 09/14/2023     Concussion without loss of consciousness 03/11/2020     Decompensated hepatic cirrhosis (H) 09/15/2023     Diabetes mellitus, type 2 (H) 11/22/2023     Essential hypertension 03/11/2020     Ganglion cyst of wrist, right 04/18/2024     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)      Liver replaced by transplant (H) 03/05/2024     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    ACR PRAJAPATI 6-7/9, treated with steroids     Mild hyperlipidemia 12/07/2021     Persistent insomnia 07/13/2023     Portal hypertension (H) 07/13/2023     Scrotal abscess      Secondary esophageal 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 (H) 07/13/2023     Tobacco abuse disorder 03/11/2020     Type 2 diabetes mellitus with hyperglycemia (H) 12/22/2023     Past Surgical History:   Procedure Laterality Date     BENCH LIVER  3/5/2024    Procedure: Bench liver;  Surgeon: Ryder Cortez MD;  Location: [facility]     CHOLECYSTECTOMY       COLONOSCOPY N/A 1/2/2024    Procedure: COLONOSCOPY, WITH POLYPECTOMY;  Surgeon: Jak Urbina MD;  Location: [facility]     CV RIGHT HEART CATH MEASUREMENTS RECORDED N/A 1/30/2024    Procedure: Right Heart Catheterization;  Surgeon: Alfred Tafoya MD;  Location: [facility] CARD[facility]     IR LIVER BIOPSY PERCUTANEOUS  3/15/2024     IR RETROPERITONEAL ABSCESS DRAINAGE  4/1/2024     TONSILLECTOMY       TRANSPLANT LIVER  RECIPIENT  DONOR N/A 3/5/2024    Procedure: Transplant liver recipient  donor, bile duct stent placement;  Surgeon: Ryder Cortez MD;  Location: [facility] OR     VASECTOMY       Social History     Socioeconomic History     Marital status:      Spouse name: Not on file     Number of children: Not on file     Years of education: Not on file     Highest education level: Not on file   Occupational History     Occupation: Not working now   Tobacco Use     Smoking status: Former     Types: Cigarettes     Passive exposure: Never     Smokeless tobacco: Current     Types: Chew     Last attempt to quit: 2004     Tobacco comments:     Chew daily 1/8 of a tin per day   Vaping Use     Vaping status: Never Used   Substance and Sexual Activity     Alcohol use: Not Currently     Alcohol/week: 12.0 standard drinks of alcohol     Types: 12 Standard drinks or equivalent per week     Comment: Sober since 2023     Drug use: Not Currently     Sexual activity: Yes     Partners: Female     Birth control/protection: Male Surgical   Other Topics Concern     Parent/sibling w/ CABG, MI or angioplasty before 65F 55M? No   Social History Narrative     Not on file     Social Determinants of Health     Financial Resource Strain: Low Risk  (2023)    Financial Resource Strain      Within the past 12 months, have you or your family members you live with been unable to get utilities (heat, electricity) when it was really needed?: No   Food Insecurity: No Food Insecurity (2024)    [facility]    Hunger Vital Sign      Worried About Running Out of Food in the Last Year: Never true      Ran Out of Food in the Last Year: Never true   Transportation Needs: No Transportation Needs (2024)    Received [facility]    PRAPARE - Transportation      Lack of Transportation (Medical): No      Lack of Transportation (Non-Medical): No   Physical Activity: Not on file   Stress: Not on  file   Social Connections: Not on file   Interpersonal Safety: Low Risk  (2023)    Interpersonal Safety      Do you feel physically and emotionally safe where you currently live?: Yes      Within the past 12 months, have you been hit, slapped, kicked or otherwise physically hurt by someone?: No      Within the past 12 months, have you been humiliated or emotionally abused in other ways by your partner or ex-partner?: No   Housing Stability: Low Risk  (2024)    Received from [facility]    Housing Stability Vital Sign      Unable to Pay for Housing in the Last Year: No      Number of Times Moved in the Last Year: 0      Homeless in the Last Year: No     Prescription Medications as of 2024         Rx Number Disp Refills Start End Last Dispensed Date Next Fill Date Owning Pharmacy    acetaminophen (TYLENOL) 325 MG tablet  -- -- 2024 --       Sig: Take 3 tablets (975 mg) by mouth every 8 hours as needed for mild pain Try first before Tramadol.    Class: No Print Out    Route: Oral    aspirin (ASA) 325 MG tablet  60 tablet 1 2024   [facility] #[id number] - [place], [place] - [address]    Si tablet (325 mg) by Oral or Feeding Tube route daily for 84 days    Class: E-Prescribe    Route: Oral or Feeding Tube    blood glucose (NO BRAND SPECIFIED) test strip  100 strip 6 10/24/2023 --   [facility] #[id number] - [place], [place] - [address]    Sig: Use to test blood sugar two times daily or as directed. To accompany: Blood Glucose Monitor Brands: per insurance.    Class: E-Prescribe    blood glucose monitoring (NO BRAND SPECIFIED) meter device kit  1 kit 0 10/24/2023 --   [facility] #[id number] - [place], [place] - [address] [place] [employer] [facility]    Sig: Use to test blood sugar twice times daily or as directed. Preferred blood glucose meter OR supplies to accompany: Blood Glucose Monitor Brands: per  insurance.    Class: E-Prescribe    Continuous Blood Gluc Sensor (DEXCOM G7 SENSOR) MISC  3 each 5 3/21/2024 --   [address]    Sig: Change every 10 days.    Class: E-Prescribe    Continuous Blood Gluc Sensor (DEXCOM G7 SENSOR) MISC  3 each 5 11/16/2023 --   [facility] #[id number] - [place], [place] - [address]    Sig: Change every 10 days.    Class: E-Prescribe    dapsone (ACZONE) 25 MG tablet  132 tablet 0 6/28/2024 9/2/2024   [address]    Sig: Take 2 tablets (50 mg) by mouth daily for 66 days Medication complete when pills run out.    Class: E-Prescribe    Route: Oral    empagliflozin (JARDIANCE) 25 MG TABS tablet  90 tablet 1 6/6/2024 --   [facility] [place] [place] #[id number] - [place], [place] - [address]    Sig: Take 1 tablet (25 mg) by mouth daily    Class: E-Prescribe    Route: Oral    fludrocortisone (FLORINEF) 0.1 MG tablet  30 tablet 0 6/28/2024 --   [address]    Sig: Take 1 tablet (0.1 mg) by mouth three times a week    Class: E-Prescribe    Route: Oral    Renewals       Renewal requests to authorizing provider (Fabi Aguirre NP) <b>prohibited</b>            Glucagon (GVOKE HYPOPEN) 1 MG/0.2ML pen  0.4 mL 1 3/21/2024 --   [address]    Sig: Inject the contents of 1 device under the skin into lower abdomen, outer thigh, or outer upper arm as needed for hypoglycemia. If no response after 15 minutes, additional 1 mg dose from a new device may be injected while waiting for emergency assistance.    Class: E-Prescribe    Notes to Pharmacy: Replaces BAQSIMI per insurance formulary    Injection Device for insulin (CEQUR SIMPLICITY 2U) KHUSHBOO  10 each 5 6/18/2024 --   [facility] [place] [place] #[id number] - [place], [place] - [address]    Si each every 3 days    Class: E-Prescribe    Route: Does not apply    insulin degludec (TRESIBA FLEXTOUCH) 100 UNIT/ML pen  15 mL 5 2024 --   [facility] #[id number] - [place], [place] - [address]    Sig: Inject 26 Units Subcutaneous every morning    Class: E-Prescribe    Route: Subcutaneous    Insulin Disposable Pump (OMNIPOD 5 G6 PODS, GEN 5,) MISC  10 each 0 2024 --   [facility] #[id number] - [place], [place] - [address]    Si each every 3 days Change every 3 days.    Class: E-Prescribe    Route: Does not apply    Insulin Lispro-aabc, 1 U Dial, (LYUMJEV KWIKPEN) 100 UNIT/ML SOPN  30 mL 2 2024 --   [facility] #[id number] - [place], [place] - [address]    Sig: Inject 6 Units Subcutaneous 3 times daily (with meals) 6 units with meals, plus correction. Pt may use up to 30 units daily. This REPLACES Humalog/Novolog insulin.    Class: E-Prescribe    Route: Subcutaneous    insulin pen needle (29G X 12.7MM) 29G X 12.7MM miscellaneous  90 each 1 2024 --   [facility] #[id number] - [place], [place] - [address]    Sig: Use 1 pen needle every three days or as directed.    Class: E-Prescribe    insulin pen needle (32G X 4 MM) 32G X 4 MM miscellaneous  200 each 1 3/21/2024 --   [facility] - [place], [place] - [address]    Sig: Use as directed by provider Per insurance coverage    Class: E-Prescribe    insulin pen needle (BD PEN NEEDLE SHERRIE 2ND GEN) 32G X 4 MM miscellaneous  100 each 11 2024 --   [facility] #[id number] - [place], [place] - [address]    Sig: USES 5 PER DAY    Class: E-Prescribe    melatonin 5 MG tablet  30 tablet 2 2024 --   [facility] - [place], [place] - [address]    Sig: Take 1 tablet (5 mg) by mouth daily  (with dinner) Take daily at dusk.    Class: E-Prescribe    Route: Oral    Renewals       Renewal requests to authorizing provider (Fabi Aguirre NP) <b>prohibited</b>            multivitamin w/minerals (THERA-VIT-M) tablet  90 tablet 1 2/2/2024 --   [facility] #[id number] - [place], [place] - [address] [facility]    Sig: TAKE 1 TABLET BY MOUTH DAILY    Class: E-Prescribe    Route: Oral    Renewals       Renewal provider: Jimmie Hoyt MD            mycophenolic acid (GENERIC EQUIVALENT) 180 MG EC tablet  540 tablet 0 4/11/2024 --   [facility] #[id number] - [place], [place] - [address] [facility]    Sig: Take 3 tablets (540 mg) by mouth 2 times daily    Class: E-Prescribe    Notes to Pharmacy: TXP DT 3/5/2024 (Liver) TXP Dischg DT 3/21/2024 DX Liver replaced by transplant Z94.4 [facility] ([place], [place])    Route: Oral    omeprazole (PRILOSEC) 20 MG DR capsule  180 capsule 1 8/3/2023 --       Sig: Take 20 mg by mouth daily    Class: Historical    Route: Oral    QUEtiapine (SEROQUEL) 25 MG tablet  -- -- 6/27/2024 --       Sig: Take 1-2 tablets (25-50 mg) by mouth nightly as needed (Sleep)    Class: No Print Out    Route: Oral    sodium zirconium cyclosilicate (LOKELMA) 10 g PACK packet  30 each 0 6/27/2024 --   [facility] - [place], [place] - [address]    Sig: Take 1 packet (10 g) by mouth daily as needed (Hyperkalemia)    Class: E-Prescribe    Route: Oral    Renewals       Renewal requests to authorizing provider (Fabi Aguirre NP) <b>prohibited</b>            thin (NO BRAND SPECIFIED) lancets  100 each 6 10/24/2023 --   [facility] #[id number] - [place], [place] - [address] [facility]    Sig: Use with lanceting device. To accompany: Blood Glucose Monitor Brands: per insurance.    Class: E-Prescribe    ursodiol (ACTIGALL) 300 MG capsule  180 capsule 3  6/13/2024 --   [facility] #[id number] - [place], [place] [address] [facility]    Sig: Take 1 capsule (300 mg) by mouth 2 times daily    Class: E-Prescribe    Route: Oral    cycloSPORINE modified (GENERIC EQUIVALENT) 100 MG capsule  120 capsule 11 7/2/2024 --   [facility] #[id number] - [place], [address]    Sig: Take 2 capsules (200 mg) by mouth 2 times daily Total dose = 275 mg BID.    Class: E-Prescribe    Route: Oral    cycloSPORINE modified (GENERIC EQUIVALENT) 25 MG capsule  180 capsule 11 7/2/2024 --   [facility] #[id number] - [place], [address]    Sig: Take 3 capsules (75 mg) by mouth 2 times daily Total dose = 275 mg BID    Class: E-Prescribe    Route: Oral    Magnesium Bisglycinate (MAG GLYCINATE) 100 MG TABS  360 tablet 0 7/2/2024 --   [facility] #[id number] - [place], [address]    Sig: Take 2 tablets (200 mg) by mouth 2 times daily    Class: E-Prescribe    Route: Oral          Other food allergy and Pineapple   REVIEW OF SYSTEMS (check box if normal)  [x]               GENERAL  [x]                 PULMONARY [x]                GENITOURINARY  [x]                CNS                 [x]                 CARDIAC  [x]                 ENDOCRINE  [x]                EARS,NOSE,THROAT [x]                 GASTROINTESTINAL [x]                 NEUROLOGIC    [x]                MUSCLOSKELTAL  [x]                  HEMATOLOGY      PHYSICAL EXAM (check box if normal)BP (!) 147/90   Wt 86.5 kg (190 lb 11.2 oz)   BMI 28.57 kg/m          [x]            GENERAL:    [x]       EYES:  ICTERIC   []        YES  []                    NO  [x]           EXTREMITIES: Clubbing []       Y     [x]           N    [x]           EARS, NOSE, THROAT: Membranes Moist    YES   [x]                   NO []                  [x]           LUNGS:  CLEAR    YES       [x]                  NO    []                                 [x]           SKIN: Jaundice           YES       []                  NO    [x]                   Rash: YES       []                  NO    [x]                                     [x]             HEART: Regular Rate          YES       [x]                  NO    []                   Incision Clean:  YES       [x]                  NO    []                                [x]                    ABDOMEN: Wound healthy Organomegaly YES       []                  NO    [x]                       [x]                    NEUROLOGICAL:  Nonfocal  YES       [x]                  NO    []                       [x]                    Hernia YES       []                  NO    [x]                   PSYCHIATRIC:  Appropriate  YES       [x]                  NO    []                       OTHER:                                                                                                   PAIN SCALE:: 3       Again, thank you for allowing me to participate in the care of your patient.        Sincerely,        Ryder Cortez MD

## 2024-07-02 NOTE — PROGRESS NOTES
HPI      ROS      Physical Exam    Transplant Surgery -OUTPATIENT IMMUNOSUPPRESSION PROGRESS NOTE    Date of Visit: 07/02/2024    Transplants:  3/5/2024 (Liver); Postoperative day:  119  ASSESMENT AND PLAN:  1.Graft Function:Liver allograft: no rejection or technical problems.    2.Immunosuppression Management:Cyclosporine, recommend levels of 150-200 ng/dL and cellcept for a total of 6  months post transplant  3.Hypertension:ok  4.Renal Function:better, would recommend seeing a nephrologist    5.Lab frequency: weekly  6.Other:    Biliary stent needs to be removed    Date: July 2, 2024    Transplant:  [x]                             Liver [x]                              Kidney []                             Pancreas []                              Other:             Chief Complaint:Follow Up (Liver transplant follow-up/)    Patient feels he is dramatically better after switch to cyclosporine from tacrolimus    History of Present Illness:  Patient Active Problem List   Diagnosis    Primary hypertension    Mild hyperlipidemia    Portal hypertensive gastropathy (H)    Secondary esophageal 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 (H)    Anemia due to unknown mechanism    Persistent insomnia    Bilateral leg edema    Alcohol use disorder, severe, in early remission (H)    Gastric varices    Severe malnutrition (H24)    Type 2 diabetes mellitus without complication, with long-term current use of insulin (H)    Generalized muscle weakness    Pulmonary HTN (H)    ANGEL (acute kidney injury) (H24)    Delirium    Liver replaced by transplant (H)    Immunosuppressed status (H24)    Liver transplant rejection (H)    Acute post-operative pain    Atrial fibrillation with RVR (H)    Hypomagnesemia    Unspecified abdominal pain    Ganglion cyst of wrist, right    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)    Hyperkalemia    S/P liver transplant (H)    Toxic metabolic encephalopathy     SOCIAL /FAMILY HISTORY: [x]                  No  recent change    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    ACR PRAJAPATI -, treated with steroids    Mild hyperlipidemia 2021    Persistent insomnia 2023    Portal hypertension (H) 2023    Scrotal abscess     Secondary esophageal 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 (H) 2023    Tobacco abuse disorder 2020    Type 2 diabetes mellitus with hyperglycemia (H) 2023     Past Surgical History:   Procedure Laterality Date    BENCH LIVER  3/5/2024    Procedure: Bench liver;  Surgeon: Ryder Cortez MD;  Location: [facility]    CHOLECYSTECTOMY      COLONOSCOPY N/A 2024    Procedure: COLONOSCOPY, WITH POLYPECTOMY;  Surgeon: Jak Urbina MD;  Location: [facility]    CV RIGHT HEART CATH MEASUREMENTS RECORDED N/A 2024    Procedure: Right Heart Catheterization;  Surgeon: Alfred Tafoya MD;  Location:  [facility] CARDIAC CATH LAB    IR LIVER BIOPSY PERCUTANEOUS  3/15/2024    IR RETROPERITONEAL ABSCESS DRAINAGE  2024    TONSILLECTOMY      TRANSPLANT LIVER RECIPIENT  DONOR N/A 3/5/2024    Procedure: Transplant liver recipient  donor, bile duct stent placement;  Surgeon: Ryder Cortez MD;  Location: [facility] OR    VASECTOMY       Social History     Socioeconomic History    Marital status:      Spouse name: Not on file    Number of children:  Not on file    Years of education: Not on file    Highest education level: Not on file   Occupational History    Occupation: Not working now   Tobacco Use    Smoking status: Former     Types: Cigarettes     Passive exposure: Never    Smokeless tobacco: Current     Types: Chew     Last attempt to quit: 1/1/2004    Tobacco comments:     Chew daily 1/8 of a tin per day   Vaping Use    Vaping status: Never Used   Substance and Sexual Activity    Alcohol use: Not Currently     Alcohol/week: 12.0 standard drinks of alcohol     Types: 12 Standard drinks or equivalent per week     Comment: Sober since 6/25/2023    Drug use: Not Currently    Sexual activity: Yes     Partners: Female     Birth control/protection: Male Surgical   Other Topics Concern    Parent/sibling w/ CABG, MI or angioplasty before 65F 55M? No   Social History Narrative    Not on file     Social Determinants of Health     Financial Resource Strain: Low Risk  (12/22/2023)    Financial Resource Strain     Within the past 12 months, have you or your family members you live with been unable to get utilities (heat, electricity) when it was really needed?: No   Food Insecurity: No Food Insecurity (6/22/2024)    Received from [employer]    Hunger Vital Sign     Worried About Running Out of Food in the Last Year: Never true     Ran Out of Food in the Last Year: Never true   Transportation Needs: No Transportation Needs (6/22/2024)    Received from [employer] [facility]    PRAPARE - Transportation     Lack of Transportation (Medical): No     Lack of Transportation (Non-Medical): No   Physical Activity: Not on file   Stress: Not on file   Social Connections: Not on file   Interpersonal Safety: Low Risk  (12/22/2023)    Interpersonal Safety     Do you feel physically and emotionally safe where you currently live?: Yes     Within the past 12 months, have you been hit, slapped, kicked or otherwise physically hurt by someone?: No     Within the past 12 months,  have you been humiliated or emotionally abused in other ways by your partner or ex-partner?: No   Housing Stability: Low Risk  (2024)    Received from [facility]    Housing Stability Vital Sign     Unable to Pay for Housing in the Last Year: No     Number of Times Moved in the Last Year: 0     Homeless in the Last Year: No     Prescription Medications as of 2024         Rx Number Disp Refills Start End Last Dispensed Date Next Fill Date Owning Pharmacy    acetaminophen (TYLENOL) 325 MG tablet  -- -- 2024 --       Sig: Take 3 tablets (975 mg) by mouth every 8 hours as needed for mild pain Try first before Tramadol.    Class: No Print Out    Route: Oral    aspirin (ASA) 325 MG tablet  60 tablet 1 2024   [id number] [place] [place] [facility] [address]    Si tablet (325 mg) by Oral or Feeding Tube route daily for 84 days    Class: E-Prescribe    Route: Oral or Feeding Tube    blood glucose (NO BRAND SPECIFIED) test strip  100 strip 6 10/24/2023 --   [id number] [place] [place] - [address]    Sig: Use to test blood sugar two times daily or as directed. To accompany: Blood Glucose Monitor Brands: per insurance.    Class: E-Prescribe    blood glucose monitoring (NO BRAND SPECIFIED) meter device kit  1 kit 0 10/24/2023 --   [facility] #[id number] - [place], [place] - [address]    Sig: Use to test blood sugar twice times daily or as directed. Preferred blood glucose meter OR supplies to accompany: Blood Glucose Monitor Brands: per insurance.    Class: E-Prescribe    Continuous Blood Gluc Sensor (DEXCOM G7 SENSOR) MISC  3 each 5 3/21/2024 --   [place], [place] - [address]    Sig: Change every 10 days.    Class: E-Prescribe    Continuous Blood Gluc Sensor (DEXCOM G7 SENSOR) MISC  3 each 5 2023 --   [facility]  #[id number] - [place], [place] - [address]    Sig: Change every 10 days.    Class: E-Prescribe    dapsone (ACZONE) 25 MG tablet  132 tablet 0 2024   [address]    Sig: Take 2 tablets (50 mg) by mouth daily for 66 days Medication complete when pills run out.    Class: E-Prescribe    Route: Oral    empagliflozin (JARDIANCE) 25 MG TABS tablet  90 tablet 1 2024 --   [facility] #[id number] - [place], [place] - [address] [place] [address]    Sig: Take 1 tablet (25 mg) by mouth daily    Class: E-Prescribe    Route: Oral    fludrocortisone (FLORINEF) 0.1 MG tablet  30 tablet 0 2024 --   [address]    Sig: Take 1 tablet (0.1 mg) by mouth three times a week    Class: E-Prescribe    Route: Oral    Renewals       Renewal requests to authorizing provider (Fabi Aguirre, NP) <b>prohibited</b>            Glucagon (GVOKE HYPOPEN) 1 MG/0.2ML pen  0.4 mL 1 3/21/2024 --   [address]    Sig: Inject the contents of 1 device under the skin into lower abdomen, outer thigh, or outer upper arm as needed for hypoglycemia. If no response after 15 minutes, additional 1 mg dose from a new device may be injected while waiting for emergency assistance.    Class: E-Prescribe    Notes to Pharmacy: Replaces [id number] per insurance formulary    Injection Device for insulin (CEQUR SIMPLICITY 2U) KHUSHBOO  10 each 5 2024 --   [facility] #[id number] - [place], [place] - [address]    Si each every 3 days    Class: E-Prescribe    Route: Does not apply    insulin degludec (TRESIBA FLEXTOUCH) 100 UNIT/ML pen  15 mL 5 2024 --   [facility] #[id number] - [place], [place] - [address]    Sig: Inject 26 Units Subcutaneous every morning     Class: E-Prescribe    Route: Subcutaneous    Insulin Disposable Pump (OMNIPOD 5 G6 PODS, GEN 5,) MISC  10 each 0 2024 --   [facility] #[id number] - [place]    Si each every 3 days Change every 3 days.    Class: E-Prescribe    Route: Does not apply    Insulin Lispro-aabc, 1 U Dial, (AMNAUMTHEODORAV IRINAPEN) 100 UNIT/ML SOPN  30 mL 2 2024 --   [facility] #[id number] - [place]    Sig: Inject 6 Units Subcutaneous 3 times daily (with meals) 6 units with meals, plus correction. Pt may use up to 30 units daily. This REPLACES Humalog/Novolog insulin.    Class: E-Prescribe    Route: Subcutaneous    insulin pen needle (29G X 12.7MM) 29G X 12.7MM miscellaneous  90 each 1 2024 --   [facility] #[id number] - [place]    Sig: Use 1 pen needle every three days or as directed.    Class: E-Prescribe    insulin pen needle (32G X 4 MM) 32G X 4 MM miscellaneous  200 each 1 3/21/2024 --   [address]    Sig: Use as directed by provider Per insurance coverage    Class: E-Prescribe    insulin pen needle (BD PEN NEEDLE SHERRIE 2ND GEN) 32G X 4 MM miscellaneous  100 each 11 2024 --   [facility] #[id number] - [place]    Sig: USES 5 PER DAY    Class: E-Prescribe    melatonin 5 MG tablet  30 tablet 2 2024 --   [address]    Sig: Take 1 tablet (5 mg) by mouth daily (with dinner) Take daily at dusk.    Class: E-Prescribe    Route: Oral    Renewals       Renewal requests to authorizing provider (Fabi Aguirre, NP) <b>prohibited</b>            multivitamin w/minerals (THERA-VIT-M) tablet  90 tablet 1 2024 --   [facility] #[id number] - [place], [place] - [address]    Sig: TAKE 1 TABLET BY MOUTH DAILY    Class: E-Prescribe    Route: Oral    Renewals       Renewal provider: Jimmie Hoyt MD            mycophenolic acid (GENERIC EQUIVALENT) 180 MG EC tablet  540 tablet 0 4/11/2024 --   [facility] #[id number] - [place], [place] - [address]    Sig: Take 3 tablets (540 mg) by mouth 2 times daily    Class: E-Prescribe    Notes to Pharmacy: TXP DT 3/5/2024 (Liver) TXP Dischg DT 3/21/2024 DX Liver replaced by transplant Z94.4 [facility] [place] ([place], [place])    Route: Oral    omeprazole (PRILOSEC) 20 MG DR capsule  180 capsule 1 8/3/2023 --       Sig: Take 20 mg by mouth daily    Class: Historical    Route: Oral    QUEtiapine (SEROQUEL) 25 MG tablet  -- -- 6/27/2024 --       Sig: Take 1-2 tablets (25-50 mg) by mouth nightly as needed (Sleep)    Class: No Print Out    Route: Oral    sodium zirconium cyclosilicate (LOKELMA) 10 g PACK packet  30 each 0 6/27/2024 --   [facility] - [place], [place] - [address] [facility]    Sig: Take 1 packet (10 g) by mouth daily as needed (Hyperkalemia)    Class: E-Prescribe    Route: Oral    Renewals       Renewal requests to authorizing provider (Fabi Aguirre, NP) <b>prohibited</b>            thin (NO BRAND SPECIFIED) lancets  100 each 6 10/24/2023 --   [facility] #[id number] - [place], [place] - [address]    Sig: Use with lanceting device. To accompany: Blood Glucose Monitor Brands: per insurance.    Class: E-Prescribe    ursodiol (ACTIGALL) 300 MG capsule  180 capsule 3 6/13/2024 --   [facility] #[id number] - [place], [place] - [address]    Sig: Take 1 capsule (300 mg) by mouth 2 times daily    Class: E-Prescribe    Route: Oral    cycloSPORINE modified (GENERIC EQUIVALENT) 100 MG capsule  120 capsule 11 7/2/2024 --   [facility] #[id number] - [place], [place] - [address]  [facility]    Sig: Take 2 capsules (200 mg) by mouth 2 times daily Total dose = 275 mg BID.    Class: E-Prescribe    Route: Oral    cycloSPORINE modified (GENERIC EQUIVALENT) 25 MG capsule  180 capsule 11 7/2/2024 --   [facility] #[id number] - [place], [place] - [address]    Sig: Take 3 capsules (75 mg) by mouth 2 times daily Total dose = 275 mg BID    Class: E-Prescribe    Route: Oral    Magnesium Bisglycinate (MAG GLYCINATE) 100 MG TABS  360 tablet 0 7/2/2024 --   [facility] #[id number] - [place], [place] - [address]    Sig: Take 2 tablets (200 mg) by mouth 2 times daily    Class: E-Prescribe    Route: Oral          Other food allergy and Pineapple   REVIEW OF SYSTEMS (check box if normal)  [x]               GENERAL  [x]                 PULMONARY [x]                GENITOURINARY  [x]                CNS                 [x]                 CARDIAC  [x]                 ENDOCRINE  [x]                EARS,NOSE,THROAT [x]                 GASTROINTESTINAL [x]                 NEUROLOGIC    [x]                MUSCLOSKELTAL  [x]                  HEMATOLOGY      PHYSICAL EXAM (check box if normal)BP (!) 147/90   Wt 86.5 kg (190 lb 11.2 oz)   BMI 28.57 kg/m          [x]            GENERAL:    [x]       EYES:  ICTERIC   []        YES  []                    NO  [x]           EXTREMITIES: Clubbing []       Y     [x]           N    [x]           EARS, NOSE, THROAT: Membranes Moist    YES   [x]                   NO []                  [x]           LUNGS:  CLEAR    YES       [x]                  NO    []                                [x]           SKIN: Jaundice           YES       []                  NO    [x]                   Rash: YES       []                  NO    [x]                                     [x]             HEART: Regular Rate          YES       [x]                  NO    []                   Incision Clean:  YES        [x]                  NO    []                                [x]                    ABDOMEN: Wound healthy Organomegaly YES       []                  NO    [x]                       [x]                    NEUROLOGICAL:  Nonfocal  YES       [x]                  NO    []                       [x]                    Hernia YES       []                  NO    [x]                   PSYCHIATRIC:  Appropriate  YES       [x]                  NO    []                       OTHER:                                                                                                   PAIN SCALE:: 3

## 2024-07-02 NOTE — NURSING NOTE
Chief Complaint   Patient presents with    RECHECK     Post liver txp follow up.     Vitals:    07/02/24 0808   BP: 138/89   BP Location: Left arm   Patient Position: Sitting   Cuff Size: Adult Regular   Pulse: 83   SpO2: 99%   Weight: 85.4 kg (188 lb 4.8 oz)       BP Readings from Last 3 Encounters:   07/02/24 138/89   06/27/24 (!) 127/93   06/21/24 109/71       /89 (BP Location: Left arm, Patient Position: Sitting, Cuff Size: Adult Regular)   Pulse 83   Wt 85.4 kg (188 lb 4.8 oz)   SpO2 99%   BMI 28.21 kg/m       Sunshine Heymadalberto

## 2024-07-02 NOTE — PROGRESS NOTES
[facility] Hepatology    Follow-up Visit    Follow-up visit for liver transplant    Subjective:  53 year old male    OLT 3/5/2024  - ETOH/?MASLD/A1AT MZ heterozygote/C282Y heterozygote   - doreen-op MELD 3.0= 31  - donor- regional/donor age 32/DBD/SCD/donor CMV(-)/recipient CMV(-)  - operation- CiT 5:26, EBL 3L, end-to-end IVC, duct-to-duct biliary anastomosis  - post-op course- mod to severe ACR 3/15/2024, delirium of unclear etiology  - immunosuppression- CyA, MpA 540    Patient comes to clinic this morning with his wife.    Last visit with me in 2023.  Patient underwent  donor liver transplant on 3/5/2024.  Postoperative course complicated with early acute cellular rejection on 3/15/2024.  Patient was admitted to the hospital last week with abnormal behavior/delirium.  This was attributed to supratherapeutic tacrolimus levels so the patient was switched from tacrolimus to cyclosporine.    Patient is well today.  He feels really well since getting his liver transplant.  He is sleeping great and has noticed a significant improvement in his energy levels since transplant.    Patient denies any abdominal pain, jaundice or lower extremity edema.  He reports occasional muscle aches in his abdomen which she attributes to increased physical activity.    Patient denies headaches, tremor or confusion.    Patient denies nausea, vomiting or diarrhea.  He reports occasional issues with constipation but nothing serious.    Patient denies fevers, sweats or chills.    Weight is currently 188 pounds.  Appetite is good.  This is much better compared to prior to transplant.    Patient is adherent to his medications.  He has no issues obtaining his medications via his pharmacy or insurance.    Patient does not drink alcohol.  1 Peth since transplant has been negative.  Patient is no 1 years sober from alcohol.      Medical hx Surgical hx   Past Medical History:   Diagnosis Date    ANGEL (acute kidney injury)  (H24)     Alcoholic cirrhosis of liver without ascites (H) 2023    Alcoholic hepatitis with ascites (H28) 10/03/2023    Alcoholic hepatitis without ascites (H28) 2023    Closed fracture of one rib of left side 2023    Concussion without loss of consciousness 2020    Decompensated hepatic cirrhosis (H) 09/15/2023    Diabetes mellitus, type 2 (H) 2023    Essential hypertension 2020    Ganglion cyst of wrist, right 2024    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)     Liver replaced by transplant (H) 2024    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    ACR PRAJAPATI 6-, treated with steroids    Mild hyperlipidemia 2021    Persistent insomnia 2023    Portal hypertension (H) 2023    Scrotal abscess     Secondary esophageal 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 (H) 2023    Tobacco abuse disorder 2020    Type 2 diabetes mellitus with hyperglycemia (H) 2023      Past Surgical History:   Procedure Laterality Date    BENCH LIVER  3/5/2024    Procedure: Bench liver;  Surgeon: Ryder oCrtez MD;  Location: [facility] OR    CHOLECYSTECTOMY      COLONOSCOPY N/A 2024    Procedure: COLONOSCOPY, WITH POLYPECTOMY;  Surgeon: Jak Urbina MD;  Location: [facility]    CV RIGHT HEART CATH MEASUREMENTS RECORDED N/A 2024    Procedure: Right Heart Catheterization;  Surgeon: Alfred Tafoya MD;  Location:  HEART CARDIAC CATH LAB    IR LIVER BIOPSY PERCUTANEOUS  3/15/2024    IR RETROPERITONEAL ABSCESS DRAINAGE  2024    TONSILLECTOMY      TRANSPLANT LIVER RECIPIENT  DONOR N/A 3/5/2024    Procedure: Transplant liver recipient  donor, bile duct stent placement;  Surgeon: Ryder Cortez MD;  Location: [facility] OR    VASECTOMY            Medications  Prior to Admission medications    Medication Sig Start Date End Date Taking? Authorizing Provider   acetaminophen (TYLENOL) 325 MG tablet Take 3 tablets (975 mg) by mouth every 8 hours  as needed for mild pain Try first before Tramadol. 6/27/24  Yes Fabi Aguirre NP   blood glucose (NO BRAND SPECIFIED) test strip Use to test blood sugar two times daily or as directed. To accompany: Blood Glucose Monitor Brands: per insurance. 10/24/23  Yes Jimmie Hoyt MD   blood glucose monitoring (NO BRAND SPECIFIED) meter device kit Use to test blood sugar twice times daily or as directed. Preferred blood glucose meter OR supplies to accompany: Blood Glucose Monitor Brands: per insurance. 10/24/23  Yes Jimmie Hoyt MD   Continuous Blood Gluc Sensor (DEXCOM G7 SENSOR) MISC Change every 10 days. 3/21/24  Yes Lesley Wise PA-C   Continuous Blood Gluc Sensor (DEXCOM G7 SENSOR) MISC Change every 10 days. 11/16/23  Yes Jimmie Hoyt MD   empagliflozin (JARDIANCE) 25 MG TABS tablet Take 1 tablet (25 mg) by mouth daily 6/6/24  Yes Geovanna Ferreira PA-C   fludrocortisone (FLORINEF) 0.1 MG tablet Take 1 tablet (0.1 mg) by mouth three times a week 6/28/24  Yes Fabi Aguirre NP   Glucagon (GVOKE HYPOPEN) 1 MG/0.2ML pen Inject the contents of 1 device under the skin into lower abdomen, outer thigh, or outer upper arm as needed for hypoglycemia. If no response after 15 minutes, additional 1 mg dose from a new device may be injected while waiting for emergency assistance. 3/21/24  Yes Lesley Wise PA-C   Injection Device for insulin (CEQUR SIMPLICITY 2U) [id number] 1 each every 3 days 6/18/24  Yes Geovanna Ferreira PA-C   insulin degludec (TRESIBA FLEXTOUCH) 100 UNIT/ML pen Inject 26 Units Subcutaneous every morning 6/18/24  Yes Geovanna Ferreira PA-C   Insulin Disposable Pump (OMNIPOD 5 G6 PODS, GEN 5,) MISC 1 each every 3 days Change every 3 days.  Patient taking differently: 1 each every 3 days Change every 3 days. Patient has not started insulin pump therapy 5/14/24  Yes Perez, Suzanne C, PA-C   Insulin Lispro-aabc, 1 U Dial, (LYUMJEV KWIKPEN) 100 UNIT/ML SOPN Inject 6  Units Subcutaneous 3 times daily (with meals) 6 units with meals, plus correction. Pt may use up to 30 units daily. This REPLACES Humalog/Novolog insulin. 6/20/24  Yes Geovanna Ferreira PA-C   insulin pen needle (29G X 12.7MM) 29G X 12.7MM miscellaneous Use 1 pen needle every three days or as directed. 6/18/24  Yes Geovanna Ferreira PA-C   insulin pen needle (32G X 4 MM) 32G X 4 MM miscellaneous Use as directed by provider Per insurance coverage 3/21/24  Yes Lesley Wise PA-C   insulin pen needle (BD PEN NEEDLE SHERRIE 2ND GEN) 32G X 4 MM miscellaneous USES 5 PER DAY 1/8/24  Yes Jimmie Hoyt MD   melatonin 5 MG tablet Take 1 tablet (5 mg) by mouth daily (with dinner) Take daily at dusk. 6/27/24  Yes Fabi Aguirre NP   multivitamin w/minerals (THERA-VIT-M) tablet TAKE 1 TABLET BY MOUTH DAILY 2/2/24  Yes Vanessa Cruz APRN CNP   omeprazole (PRILOSEC) 20 MG DR capsule Take 20 mg by mouth daily 8/3/23  Yes Jimmie Hoyt MD   QUEtiapine (SEROQUEL) 25 MG tablet Take 1-2 tablets (25-50 mg) by mouth nightly as needed (Sleep) 6/27/24  Yes Fabi Aguirre NP   sodium zirconium cyclosilicate (LOKELMA) 10 g PACK packet Take 1 packet (10 g) by mouth daily as needed (Hyperkalemia) 6/27/24  Yes Fabi Aguirre NP   thin (NO BRAND SPECIFIED) lancets Use with lanceting device. To accompany: Blood Glucose Monitor Brands: per insurance. 10/24/23  Yes Jimmie Hoyt MD   ursodiol (ACTIGALL) 300 MG capsule Take 1 capsule (300 mg) by mouth 2 times daily 6/13/24  Yes Ryder Cortez MD   aspirin (ASA) 325 MG tablet 1 tablet (325 mg) by Oral or Feeding Tube route daily for 65 days 7/2/24 9/5/24  Hugo Daigle MD   cycloSPORINE modified (GENERIC EQUIVALENT) 100 MG capsule Take 2 capsules (200 mg) by mouth 2 times daily Total dose = 275 mg BID. 7/2/24   Hugo Daigle MD   cycloSPORINE modified (GENERIC EQUIVALENT) 25 MG capsule Take 3 capsules (75 mg) by mouth 2  times daily Total dose = 275 mg BID 7/2/24   Hugo Daigle MD   dapsone (ACZONE) 25 MG tablet Take 2 tablets (50 mg) by mouth daily for 65 days Medication complete when pills run out. 7/2/24 9/5/24  Hugo Daigle MD   Magnesium Bisglycinate (MAG GLYCINATE) 100 MG TABS Take 2 tablets (200 mg) by mouth 2 times daily 7/2/24   Hugo Daigle MD   mycophenolic acid (GENERIC EQUIVALENT) 180 MG EC tablet Take 3 tablets (540 mg) by mouth 2 times daily On 7/8 start 360mg BID, on 7/15 start 180mg BID, on 7/22 start 180mg daily 7/2/24   Hugo Daigle MD       Allergies  Allergies   Allergen Reactions    Other Food Allergy Anaphylaxis     Legumes (black beans, baked beans, chickpeas)    Pineapple Itching       Review of systems  A 10-point review of systems was negative    Examination  /89 (BP Location: Left arm, Patient Position: Sitting, Cuff Size: Adult Regular)   Pulse 83   Wt 85.4 kg (188 lb 4.8 oz)   SpO2 99%   BMI 28.21 kg/m    Body mass index is 28.21 kg/m .    Gen- well, NAD, A+Ox3, normal color  CVS- RRR  RS- CTA  Abd- OLT scar, SNT, BS+  Extr- hands normal, no DANILO  Skin- no rash or jaundice  Neuro- no tremor  Psych- normal mood    Laboratory  Lab Results   Component Value Date     07/01/2024     07/05/2020    POTASSIUM 4.0 07/01/2024    POTASSIUM 4.1 03/05/2024    POTASSIUM 3.8 03/12/2022    POTASSIUM 4.2 07/05/2020    CHLORIDE 107 07/01/2024    CHLORIDE 101 03/12/2022    CHLORIDE 102 07/05/2020    CO2 25 07/01/2024    CO2 25 03/12/2022    CO2 28 07/05/2020    BUN 24.7 07/01/2024    BUN 11 03/12/2022    BUN 13 07/05/2020    CR 1.24 07/01/2024    CR 0.95 07/05/2020       Lab Results   Component Value Date    BILITOTAL 0.7 07/01/2024    BILITOTAL 0.8 12/19/2006    ALT 18 07/01/2024    ALT 50 12/19/2006    AST 26 07/01/2024    AST 52 12/19/2006    ALKPHOS 91 07/01/2024    ALKPHOS 117 12/19/2006       Lab Results   Component Value Date    ALBUMIN 4.0 07/01/2024     ALBUMIN 4.0 09/03/2022    ALBUMIN 5.2 12/19/2006    PROTTOTAL 6.5 07/01/2024    PROTTOTAL 9.7 12/19/2006        Lab Results   Component Value Date    WBC 8.6 07/01/2024    WBC 10.8 12/19/2006    HGB 11.3 07/01/2024    HGB 16.9 12/19/2006    MCV 87 07/01/2024    MCV 85 12/19/2006     07/01/2024     12/19/2006       Lab Results   Component Value Date    INR 1.13 03/31/2024       Radiology  AXR 7/2/2024 reviewed- biliary stent present    Assessment  53 year old male who presents for follow-up after liver transplant and March 2024.  Liver function test normal on current immunosuppression.  Renal function stable on calcineurin inhibitor.  Renal function should improve with tapering of CNI.  Will start to taper off MMF per protocol.  Will need EGD for removal of biliary stent which is present on AXR.  Overall, patient is doing very well since his liver transplant, particularly given his high MELD at the time of transplant.    Plan  Liver transplant  - continue CyA, target trough 150-200  - taper off MMF  - labs per protocol    Biliary stent  - EGD for stent removal    Follow-up with me in 3 months    Hugo Muniz MD  Hepatology  [facility]    I spent 40 minutes on the date of the encounter doing chart review, history and exam, documentation and further activities as noted above.     On 7/1/2024, approximately 35 minutes of non face-to-face time were spent in review of the patient's medical record to date.  This included review of previous: clinic visits, hospital records, lab results, imaging studies, and procedural documentation.  The findings from this review are summarized in the above note.

## 2024-07-02 NOTE — PROGRESS NOTES
Virtual Visit Details    Type of service:  Video Visit     Originating Location (pt. Location): Home    Distant Location (provider location):  On-site  Platform used for Video Visit: Yenifer    Diabetes Self-Management Education & Support    Mary Lopez presents today for education related to Type 2 diabetes    Patient is being treated with:  insulin  He is accompanied by spouse    Year of diagnosis: 2023  Referring provider:  Lai  Patient is followed by Suzanne THOMPSON, now followed by Geovanna THOMPSON  Living Situation: with spouse   Employment: n/a    PATIENT CONCERNS RELATED TO DIABETES SELF MANAGEMENT:       ASSESSMENT:    Taking Medication:     Current Diabetes Management per Patient:  Taking diabetes medications? yes:     Diabetes Medication(s)       Diabetic Other       Glucagon (GVOKE HYPOPEN) 1 MG/0.2ML pen Inject the contents of 1 device under the skin into lower abdomen, outer thigh, or outer upper arm as needed for hypoglycemia. If no response after 15 minutes, additional 1 mg dose from a new device may be injected while waiting for emergency assistance.       Insulin       insulin degludec (TRESIBA FLEXTOUCH) 100 UNIT/ML pen Inject 26 Units Subcutaneous every morning     Insulin Lispro-aabc, 1 U Dial, (LYUMJEV KWIKPEN) 100 UNIT/ML SOPN Inject 6 Units Subcutaneous 3 times daily (with meals) 6 units with meals, plus correction. Pt may use up to 30 units daily. This REPLACES Humalog/Novolog insulin.       Sodium-Glucose Co-Transporter 2 (SGLT2) Inhibitors       empagliflozin (JARDIANCE) 25 MG TABS tablet Take 1 tablet (25 mg) by mouth daily            Monitoring                Patient's most recent   Lab Results   Component Value Date    A1C 8.1 10/31/2023        Healthy Eating:   encouraged consistent carb diet    Problem Solving:      Patient is at risk of hypoglycemia?: YES  Hospitalizations for hyper or hypoglycemia: No      EDUCATION and INSTRUCTION PROVIDED AT THIS VISIT:    Reviewed  reports with patient. He has the Cequr now and needs to schedule training. I will reach out to [place].  Patient was having low glucose on 6 units rapid acting insulin so they decreased to 4 units. Will keep insulin doses as is for now.   Delayed bolusing is contributing to highs and Cequr should help Mary to give his insulin pre-meal. He states he wants to take more charge of his care.  We discussed carbs today. Limiting sweetened beverages and high sugary foods. Consistent carb (60 grams) and plate method. General healthy eating.     Patient-stated goal written and given to Mary Lopez.  Verbalized and demonstrated understanding of instructions.       FOLLOW-UP:    Two weeks    Time spent was 20 minutes    Any diabetes medication dose changes were made via the CDE Protocol and Collaborative Practice Agreement with [place] and  [facility].  A copy of this encounter was provided to patient's referring provider.

## 2024-07-02 NOTE — LETTER
2024      Mary Lopez  [address] [place] [address]      Dear Colleague,    Thank you for referring your patient, Mary Lopez, to the [facility] [place]. Please see a copy of my visit note below.    [facility]    Follow-up Visit    Follow-up visit for liver transplant    Subjective:  53 year old male    OLT 3/5/2024  - ETOH/?MASLD/A1AT MZ heterozygote/C282Y heterozygote   - doreen-op MELD 3.0= 31  - donor- regional/donor age 32/DBD/SCD/donor CMV(-)/recipient CMV(-)  - operation- CiT 5:26, EBL 3L, end-to-end IVC, duct-to-duct biliary anastomosis  - post-op course- mod to severe ACR 3/15/2024, delirium of unclear etiology  - immunosuppression- CyA, MpA 540    Patient comes to clinic this morning with his wife.    Last visit with me in 2023.  Patient underwent  donor liver transplant on 3/5/2024.  Postoperative course complicated with early acute cellular rejection on 3/15/2024.  Patient was admitted to the hospital last week with abnormal behavior/delirium.  This was attributed to supratherapeutic tacrolimus levels so the patient was switched from tacrolimus to cyclosporine.    Patient is well today.  He feels really well since getting his liver transplant.  He is sleeping great and has noticed a significant improvement in his energy levels since transplant.    Patient denies any abdominal pain, jaundice or lower extremity edema.  He reports occasional muscle aches in his abdomen which she attributes to increased physical activity.    Patient denies headaches, tremor or confusion.    Patient denies nausea, vomiting or diarrhea.  He reports occasional issues with constipation but nothing serious.    Patient denies fevers, sweats or chills.    Weight is currently 188 pounds.  Appetite is good.  This is much better compared to prior to transplant.    Patient is adherent to his medications.  He has no issues obtaining his  medications via his pharmacy or insurance.    Patient does not drink alcohol.  1 Peth since transplant has been negative.  Patient is no 1 years sober from alcohol.      Medical hx Surgical hx   Past Medical History:   Diagnosis Date     ANGEL (acute kidney injury) (H24)      Alcoholic cirrhosis of liver without ascites (H) 2023     Alcoholic hepatitis with ascites (H28) 10/03/2023     Alcoholic hepatitis without ascites (H28) 2023     Closed fracture of one rib of left side 2023     Concussion without loss of consciousness 2020     Decompensated hepatic cirrhosis (H) 09/15/2023     Diabetes mellitus, type 2 (H) 2023     Essential hypertension 2020     Ganglion cyst of wrist, right 2024     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)      Liver replaced by transplant (H) 2024     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    ACR PRAJAPATI 6-7, treated with steroids     Mild hyperlipidemia 2021     Persistent insomnia 2023     Portal hypertension (H) 2023     Scrotal abscess      Secondary esophageal 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 (H) 2023     Tobacco abuse disorder 2020     Type 2 diabetes mellitus with hyperglycemia (H) 2023      Past Surgical History:   Procedure Laterality Date     BENCH LIVER  3/5/2024    Procedure: Bench liver;  Surgeon: Ryder Cortez MD;  Location: [facility]     CHOLECYSTECTOMY       COLONOSCOPY N/A 2024    Procedure: COLONOSCOPY, WITH POLYPECTOMY;  Surgeon: Jak Urbina MD;  Location: [facility]     CV RIGHT HEART CATH MEASUREMENTS RECORDED N/A 2024    Procedure: Right Heart Catheterization;  Surgeon: Alfred Tafoya MD;  Location: [facility] HEART CARDIAC CATH LAB     IR LIVER BIOPSY PERCUTANEOUS  3/15/2024     IR RETROPERITONEAL ABSCESS DRAINAGE  2024     TONSILLECTOMY       TRANSPLANT LIVER RECIPIENT  DONOR N/A 3/5/2024    Procedure: Transplant liver recipient  donor, bile duct  stent placement;  Surgeon: Ryder Cortez MD;  Location: [facility] OR     VASECTOMY            Medications  Prior to Admission medications    Medication Sig Start Date End Date Taking? Authorizing Provider   acetaminophen (TYLENOL) 325 MG tablet Take 3 tablets (975 mg) by mouth every 8 hours as needed for mild pain Try first before Tramadol. 6/27/24  Yes Fabi Aguirre NP   blood glucose (NO BRAND SPECIFIED) test strip Use to test blood sugar two times daily or as directed. To accompany: Blood Glucose Monitor Brands: per insurance. 10/24/23  Yes Jimmie Hoyt MD   blood glucose monitoring (NO BRAND SPECIFIED) meter device kit Use to test blood sugar twice times daily or as directed. Preferred blood glucose meter OR supplies to accompany: Blood Glucose Monitor Brands: per insurance. 10/24/23  Yes Jimmie Hoyt MD   Continuous Blood Gluc Sensor (DEXCOM G7 SENSOR) MISC Change every 10 days. 3/21/24  Yes Lesley Wise PA-C   Continuous Blood Gluc Sensor (DEXCOM G7 SENSOR) MISC Change every 10 days. 11/16/23  Yes Jimmie oHyt MD   empagliflozin (JARDIANCE) 25 MG TABS tablet Take 1 tablet (25 mg) by mouth daily 6/6/24  Yes Geovanna Ferreira PA-C   fludrocortisone (FLORINEF) 0.1 MG tablet Take 1 tablet (0.1 mg) by mouth three times a week 6/28/24  Yes Fabi Aguirre NP   Glucagon (GVOKE HYPOPEN) 1 MG/0.2ML pen Inject the contents of 1 device under the skin into lower abdomen, outer thigh, or outer upper arm as needed for hypoglycemia. If no response after 15 minutes, additional 1 mg dose from a new device may be injected while waiting for emergency assistance. 3/21/24  Yes Lesley Wise PA-C   Injection Device for insulin (CEQUR SIMPLICITY 2U) [id number] 1 each every 3 days 6/18/24  Yes Geovanna Ferreira PA-C   insulin degludec (TRESIBA FLEXTOUCH) 100 UNIT/ML pen Inject 26 Units Subcutaneous every morning 6/18/24  Yes Geovanna Ferreira PA-C   Insulin Disposable Pump (OMNIPOD 5 G6  PODS, GEN 5,) MISC 1 each every 3 days Change every 3 days.  Patient taking differently: 1 each every 3 days Change every 3 days. Patient has not started insulin pump therapy 5/14/24  Yes Suzanne Todd PA-C   Insulin Lispro-kourtney, 1 U Dial, (MCKAY AREVALOPEN) 100 UNIT/ML SOPN Inject 6 Units Subcutaneous 3 times daily (with meals) 6 units with meals, plus correction. Pt may use up to 30 units daily. This REPLACES Humalog/Novolog insulin. 6/20/24  Yes Geovanna Ferreira PA-C   insulin pen needle (29G X 12.7MM) 29G X 12.7MM miscellaneous Use 1 pen needle every three days or as directed. 6/18/24  Yes Geovanna Ferreira PA-C   insulin pen needle (32G X 4 MM) 32G X 4 MM miscellaneous Use as directed by provider Per insurance coverage 3/21/24  Yes Lesley Wise PA-C   insulin pen needle (BD PEN NEEDLE SHERRIE 2ND GEN) 32G X 4 MM miscellaneous USES 5 PER DAY 1/8/24  Yes Jimmie Hoyt MD   melatonin 5 MG tablet Take 1 tablet (5 mg) by mouth daily (with dinner) Take daily at dusk. 6/27/24  Yes Fabi Aguirre NP   multivitamin w/minerals (THERA-VIT-M) tablet TAKE 1 TABLET BY MOUTH DAILY 2/2/24  Yes Vanessa Cruz APRN CNP   omeprazole (PRILOSEC) 20 MG DR capsule Take 20 mg by mouth daily 8/3/23  Yes Jimmie Hoyt MD   QUEtiapine (SEROQUEL) 25 MG tablet Take 1-2 tablets (25-50 mg) by mouth nightly as needed (Sleep) 6/27/24  Yes Fabi Aguirre NP   sodium zirconium cyclosilicate (LOKELMA) 10 g PACK packet Take 1 packet (10 g) by mouth daily as needed (Hyperkalemia) 6/27/24  Yes Fabi Aguirre NP   thin (NO BRAND SPECIFIED) lancets Use with lanceting device. To accompany: Blood Glucose Monitor Brands: per insurance. 10/24/23  Yes Jimmie Hoyt MD   ursodiol (ACTIGALL) 300 MG capsule Take 1 capsule (300 mg) by mouth 2 times daily 6/13/24  Yes Ryder Cortez MD   aspirin (ASA) 325 MG tablet 1 tablet (325 mg) by Oral or Feeding Tube route daily for 65 days 7/2/24 9/5/24  Muniz  Hugo Lerma MD   cycloSPORINE modified (GENERIC EQUIVALENT) 100 MG capsule Take 2 capsules (200 mg) by mouth 2 times daily Total dose = 275 mg BID. 7/2/24   Hugo Daigle MD   cycloSPORINE modified (GENERIC EQUIVALENT) 25 MG capsule Take 3 capsules (75 mg) by mouth 2 times daily Total dose = 275 mg BID 7/2/24   Hugo Daigle MD   dapsone (ACZONE) 25 MG tablet Take 2 tablets (50 mg) by mouth daily for 65 days Medication complete when pills run out. 7/2/24 9/5/24  Hugo Daigle MD   Magnesium Bisglycinate (MAG GLYCINATE) 100 MG TABS Take 2 tablets (200 mg) by mouth 2 times daily 7/2/24   Hugo Daigle MD   mycophenolic acid (GENERIC EQUIVALENT) 180 MG EC tablet Take 3 tablets (540 mg) by mouth 2 times daily On 7/8 start 360mg BID, on 7/15 start 180mg BID, on 7/22 start 180mg daily 7/2/24   Hugo Daigle MD       Allergies  Allergies   Allergen Reactions     Other Food Allergy Anaphylaxis     Legumes (black beans, baked beans, chickpeas)     Pineapple Itching       Review of systems  A 10-point review of systems was negative    Examination  /89 (BP Location: Left arm, Patient Position: Sitting, Cuff Size: Adult Regular)   Pulse 83   Wt 85.4 kg (188 lb 4.8 oz)   SpO2 99%   BMI 28.21 kg/m    Body mass index is 28.21 kg/m .    Gen- well, NAD, A+Ox3, normal color  CVS- RRR  RS- CTA  Abd- OLT scar, SNT, BS+  Extr- hands normal, no DANILO  Skin- no rash or jaundice  Neuro- no tremor  Psych- normal mood    Laboratory  Lab Results   Component Value Date     07/01/2024     07/05/2020    POTASSIUM 4.0 07/01/2024    POTASSIUM 4.1 03/05/2024    POTASSIUM 3.8 03/12/2022    POTASSIUM 4.2 07/05/2020    CHLORIDE 107 07/01/2024    CHLORIDE 101 03/12/2022    CHLORIDE 102 07/05/2020    CO2 25 07/01/2024    CO2 25 03/12/2022    CO2 28 07/05/2020    BUN 24.7 07/01/2024    BUN 11 03/12/2022    BUN 13 07/05/2020    CR 1.24 07/01/2024    CR 0.95 07/05/2020       Lab  Results   Component Value Date    BILITOTAL 0.7 07/01/2024    BILITOTAL 0.8 12/19/2006    ALT 18 07/01/2024    ALT 50 12/19/2006    AST 26 07/01/2024    AST 52 12/19/2006    ALKPHOS 91 07/01/2024    ALKPHOS 117 12/19/2006       Lab Results   Component Value Date    ALBUMIN 4.0 07/01/2024    ALBUMIN 4.0 09/03/2022    ALBUMIN 5.2 12/19/2006    PROTTOTAL 6.5 07/01/2024    PROTTOTAL 9.7 12/19/2006        Lab Results   Component Value Date    WBC 8.6 07/01/2024    WBC 10.8 12/19/2006    HGB 11.3 07/01/2024    HGB 16.9 12/19/2006    MCV 87 07/01/2024    MCV 85 12/19/2006     07/01/2024     12/19/2006       Lab Results   Component Value Date    INR 1.13 03/31/2024       Radiology  AXR 7/2/2024 reviewed- biliary stent present    Assessment  53 year old male who presents for follow-up after liver transplant and March 2024.  Liver function test normal on current immunosuppression.  Renal function stable on calcineurin inhibitor.  Renal function should improve with tapering of CNI.  Will start to taper off MMF per protocol.  Will need EGD for removal of biliary stent which is present on AXR.  Overall, patient is doing very well since his liver transplant, particularly given his high MELD at the time of transplant.    Plan  Liver transplant  - continue CyA, target trough 150-200  - taper off MMF  - labs per protocol    Biliary stent  - EGD for stent removal    Follow-up with me in 3 months    Hugo Muniz MD  Hepatology  [facility]    I spent 40 minutes on the date of the encounter doing chart review, history and exam, documentation and further activities as noted above.     On 7/1/2024, approximately 35 minutes of non face-to-face time were spent in review of the patient's medical record to date.  This included review of previous: clinic visits, hospital records, lab results, imaging studies, and procedural documentation.  The findings from this review are summarized in the above note.

## 2024-07-02 NOTE — TELEPHONE ENCOUNTER
"Mary is 4 mos post transplant, here for 1st post transplant hepatology visit today, accompanied by his wife and primary care giver, Adina.  Mary was hospitalized again last week for confusion, at this time he was taken off tacrolimus and started on CSA.    Stent:  present on xray, will set up for upper endo for removal.  Concerns: \"pit in my stomach\" - on omeprazole.  Will wean off MPA starting July 8.  Has a raised lesion on the right side of his skull, has derm appt in September.   Strength:  doing much better after being started on seroquel which has improved his sleep.  Has been outside working on his deck, feeling much more energetic.  Works as a [employer].  Dr. Muniz recommends another 3 2-3 mos before returning to work.    Appetite good.  Med changes:  Will begin mycophenolic acid wean on 7/8.  He is currently taking MPA On 7/8 he will decrease to 360 mb po bid, on 7/15 he will decrease to 180 mg po bid, on 7/22 he will decrease to 180 mg daily.  He will do weekly labs starting Monday 7/8 w CSA levels.  After that he will decrease labs to every other week for a month.  He can stop ASA and dapsone on 9/5.  He will stop sara when his biliary stent is removed.  Follow-up  PCP annually, reminded to watch BP and report BP >140/85 to PCP.  Dr. Muniz in 3 mos.      "

## 2024-07-08 ENCOUNTER — LAB (OUTPATIENT)
Dept: LAB | Facility: CLINIC | Age: 54
End: 2024-07-08
Payer: COMMERCIAL

## 2024-07-08 ENCOUNTER — TELEPHONE (OUTPATIENT)
Dept: GASTROENTEROLOGY | Facility: CLINIC | Age: 54
End: 2024-07-08

## 2024-07-08 DIAGNOSIS — Z94.4 LIVER REPLACED BY TRANSPLANT (H): Chronic | ICD-10-CM

## 2024-07-08 LAB
ALBUMIN SERPL BCG-MCNC: 4.2 G/DL (ref 3.5–5.2)
ALP SERPL-CCNC: 100 U/L (ref 40–150)
ALT SERPL W P-5'-P-CCNC: 13 U/L (ref 0–70)
ANION GAP SERPL CALCULATED.3IONS-SCNC: 8 MMOL/L (ref 7–15)
AST SERPL W P-5'-P-CCNC: 28 U/L (ref 0–45)
BILIRUB DIRECT SERPL-MCNC: 0.24 MG/DL (ref 0–0.3)
BILIRUB SERPL-MCNC: 0.6 MG/DL
BUN SERPL-MCNC: 22.5 MG/DL (ref 6–20)
CALCIUM SERPL-MCNC: 9.7 MG/DL (ref 8.6–10)
CHLORIDE SERPL-SCNC: 109 MMOL/L (ref 98–107)
CREAT SERPL-MCNC: 1.25 MG/DL (ref 0.67–1.17)
DEPRECATED HCO3 PLAS-SCNC: 23 MMOL/L (ref 22–29)
EGFRCR SERPLBLD CKD-EPI 2021: 69 ML/MIN/1.73M2
ERYTHROCYTE [DISTWIDTH] IN BLOOD BY AUTOMATED COUNT: 13.5 % (ref 10–15)
GLUCOSE SERPL-MCNC: 137 MG/DL (ref 70–99)
HCT VFR BLD AUTO: 34.8 % (ref 40–53)
HGB BLD-MCNC: 11.9 G/DL (ref 13.3–17.7)
MAGNESIUM SERPL-MCNC: 1.6 MG/DL (ref 1.7–2.3)
MCH RBC QN AUTO: 30.4 PG (ref 26.5–33)
MCHC RBC AUTO-ENTMCNC: 34.2 G/DL (ref 31.5–36.5)
MCV RBC AUTO: 89 FL (ref 78–100)
PHOSPHATE SERPL-MCNC: 4.2 MG/DL (ref 2.5–4.5)
PLATELET # BLD AUTO: 211 10E3/UL (ref 150–450)
POTASSIUM SERPL-SCNC: 4.4 MMOL/L (ref 3.4–5.3)
PROT SERPL-MCNC: 6.7 G/DL (ref 6.4–8.3)
RBC # BLD AUTO: 3.91 10E6/UL (ref 4.4–5.9)
SODIUM SERPL-SCNC: 140 MMOL/L (ref 135–145)
WBC # BLD AUTO: 12.6 10E3/UL (ref 4–11)

## 2024-07-08 PROCEDURE — 80053 COMPREHEN METABOLIC PANEL: CPT

## 2024-07-08 PROCEDURE — 36415 COLL VENOUS BLD VENIPUNCTURE: CPT

## 2024-07-08 PROCEDURE — 84100 ASSAY OF PHOSPHORUS: CPT

## 2024-07-08 PROCEDURE — 82248 BILIRUBIN DIRECT: CPT

## 2024-07-08 PROCEDURE — 83735 ASSAY OF MAGNESIUM: CPT

## 2024-07-08 PROCEDURE — 85027 COMPLETE CBC AUTOMATED: CPT

## 2024-07-08 PROCEDURE — 80158 DRUG ASSAY CYCLOSPORINE: CPT

## 2024-07-08 NOTE — TELEPHONE ENCOUNTER
FUTURE VISIT INFORMATION      SURGERY INFORMATION:  Date: TBD- Gastro    RECORDS REQUESTED FROM:       Primary Care Provider: Jimmie Hoyt MD - [facility]    Pertinent Medical History: hypertension, atrial fibrillation     Most recent EKG+ Tracin24    Most recent ECHO: 24    Most recent Coronary Angiogram: 24       [facility]  [facility] - Surgery ([facility])  Brief Operative Note    Date: 01/05/2023    Pre-Op Diagnosis:   Left renal masses  ESRD on HD  ADPCKD  History of RCC s/p right nephrectomy    Post-Op Diagnosis: same    Procedure(s) Performed:   1. Left robotic nephrectomy    Surgeon: Aidan Hendricks MD    Assistant: Edil Fernandez MD    Bedside Assistant: DENTON Jose (no qualified resident was available for bedside assistance)    Anesthesia: General endotracheal anesthesia    Estimated Blood Loss:  50 mL    Specimen(s):   1. Left kidney    Drains: none    Complications: Injury to left colon upon Verress needle insertion    Findings:    1. Left kidney removed while sparing adrenal.  2. 1 artery, 1 vein.  3. Upon Verress insertion in LUQ, feculent material returned. New Verress access obtained in LLQ and abdomen insufflated. Needle injury to left colon discovered. Repaired by Dr. Maikel Lamb with surgical oncology using interrupted 3-0 Vicryl.    Disposition: The patient will be admitted to the urology service for observation.    Edil Fernandez MD

## 2024-07-08 NOTE — TELEPHONE ENCOUNTER
"Endoscopy Scheduling Screen    Have you had a positive Covid test in the last 14 days?  No    What is your communication preference for Instructions and/or Bowel Prep?   MyChart    What insurance is in the chart?  Other:  [employer]     Ordering/Referring Provider:     S/P liver transplant (H) [Z94.4]       (If ordering provider performs procedure, schedule with ordering provider unless otherwise instructed. )    BMI: Estimated body mass index is 28.57 kg/m  as calculated from the following:    Height as of 6/21/24: 1.74 m (5' 8.5\").    Weight as of 7/2/24: 86.5 kg (190 lb 11.2 oz).     Sedation Ordered  moderate sedation.   If patient BMI > 50 do not schedule in [facility].    If patient BMI > 45 do not schedule at [facility].    Are you taking methadone or Suboxone?  No    Have you had difficulties, pain, or discomfort during past endoscopy procedures?  No    Are you taking any prescription medications for pain 3 or more times per week?   NO, No RN review required.    Do you have a history of malignant hyperthermia?  No    (Females) Are you currently pregnant?   No     Have you been diagnosed or told you have pulmonary hypertension?   No    Do you have an LVAD?  No    Have you been told you have moderate to severe sleep apnea?  No    Have you been told you have COPD, asthma, or any other lung disease?  No    Do you have any heart conditions?  No     Have you ever had or are you waiting for an organ transplant?  Yes. Have you had or are on on the wait list for a heart/lung transplant? No, may schedule at all facilities except [facility]    Have you had a stroke or transient ischemic attack (TIA aka \"mini stroke\" in the last 6 months?   No    Have you been diagnosed with or been told you have cirrhosis of the liver?   Yes (RN Review required for scheduling unless scheduling in Hospital.) - but had transplant in march 2024    Are you currently on dialysis?   No    Do you need assistance transferring?   No    BMI: Estimated body mass index " "is 28.57 kg/m  as calculated from the following:    Height as of 6/21/24: 1.74 m (5' 8.5\").    Weight as of 7/2/24: 86.5 kg (190 lb 11.2 oz).     Is patients BMI > 40 and scheduling location [facility]?  No    Do you take an injectable medication for weight loss or diabetes (excluding insulin)?  No    Do you take the medication Naltrexone?  No    Do you take blood thinners?  No       Prep   Are you currently on dialysis or do you have chronic kidney disease?  No    Do you have a diagnosis of diabetes?  Yes (Golytely Prep)    Do you have a diagnosis of cystic fibrosis (CF)?  No    On a regular basis do you go 3 -5 days between bowel movements?  No    BMI > 40?  No    Preferred Pharmacy:    [id number] [address] & [address] [address]  Phone: [phone] Fax: [phone]    Final Scheduling Details     Procedure scheduled  Upper endoscopy (EGD)    Please call Adina/Wife to coordinate: [phone]    Surgeon:  Flavio per order      Date of procedure:  07/12/2024     Pre-OP / PAC:   No - Not required for this site.    Location  [facility] - Per order.    Sedation   Moderate Sedation - Per order.      Patient Reminders:   You will receive a call from a Nurse to review instructions and health history.  This assessment must be completed prior to your procedure.  Failure to complete the Nurse assessment may result in the procedure being cancelled.      On the day of your procedure, please designate an adult(s) who can drive you home stay with you for the next 24 hours. The medicines used in the exam will make you sleepy. You will not be able to drive.      You cannot take public transportation, ride share services, or non-medical taxi service without a responsible caregiver.  Medical transport services are allowed with the requirement that a responsible caregiver will receive you at your destination.  We require that drivers and caregivers are confirmed " prior to your procedure.

## 2024-07-08 NOTE — TELEPHONE ENCOUNTER
07/08/2024: per RN review: Pt needs to be rescheduled with MAC and a PAC evaluation due to Pulmonary Hypertension       **LVMTCB - awaiting pt call back to r/s + MAC   (Spot is held on 07/16n + MAC + [facility] + [facility] @ 12:30 pm/Approved by Lucie)      **Pt will need a PAC EVAL     **[facility] [place].     [place]

## 2024-07-08 NOTE — TELEPHONE ENCOUNTER
Caller: Rosio (spouse)    Reason for Reschedule/Cancellation   (please be detailed, any staff messages or encounters to note?): needs MAC at [facility]      Prior to reschedule please review:  Ordering Provider: Flavio  Sedation Determined: MAC  Does patient have any ASC Exclusions, please identify?: yes-Pulmonary hypertension      Notes on Cancelled Procedure:  Procedure: Upper Endoscopy [EGD]   Date: 7/12  Location: [facility]; [address] [place] [address]   Surgeon: Flavio      Rescheduled: Yes,   Procedure: Upper Endoscopy [EGD]    Date: 7/16   Location: [facility]; [address] [place] [address]    Surgeon: Flavio   Sedation Level Scheduled  MAC ,  Reason for Sedation Level review   Instructions updated and sent: y     Does patient need PAC or Pre -Op Rescheduled? : n       Did you cancel or rescheduled an EUS procedure? No.

## 2024-07-09 DIAGNOSIS — Z94.4 S/P LIVER TRANSPLANT (H): ICD-10-CM

## 2024-07-09 LAB
CYCLOSPORINE BLD LC/MS/MS-MCNC: 134 UG/L (ref 50–400)
TME LAST DOSE: NORMAL H
TME LAST DOSE: NORMAL H

## 2024-07-09 RX ORDER — CYCLOSPORINE 100 MG/1
300 CAPSULE, LIQUID FILLED ORAL EVERY 12 HOURS
Qty: 180 CAPSULE | Refills: 11 | Status: SHIPPED | OUTPATIENT
Start: 2024-07-09 | End: 2024-07-23

## 2024-07-09 NOTE — TELEPHONE ENCOUNTER
07/09: Spoke with wife/Adina [phone]    **Per Ije awaiting a time confirmation.     **[place].       **per wife -- please Leave a detailed VM if unavailable.

## 2024-07-09 NOTE — TELEPHONE ENCOUNTER
If pt calls back please direct him to me if available. Awaiting on approval for moderate at [facility] on 7/12 as Dr. Muniz states pt does not have Pulmonary HTN.

## 2024-07-09 NOTE — TELEPHONE ENCOUNTER
ISSUE:   Cyclosporine level 134 on 7/8, goal 150-250, dose 275 mg BID.    PLAN:   Call Patient and confirm this was an accurate 12-hour trough.   Verify Cyclosporine dose ..   Confirm no new medications or or missed doses.   Confirm no new illness / infection / diarrhea.   If accurate trough and accurate dose, increase Cyclosporine dose to 300 mg BID     Repeat labs on Thursday due to very low level on being on CSA vs tacrolimus  Lore Mckeon RN     OUTCOME:   Spoke with Rosio, they confirm accurate trough level and current dose 275 mg BID.   Rosio confirmed dose change to 300 mg BID.  Rosio agreed to repeat labs in 3 days.   Orders sent to preferred pharmacy for dose change and lab for repeat labs.   Rosio voiced understanding of plan.      Valentina Enamorado LPN

## 2024-07-10 ENCOUNTER — TELEPHONE (OUTPATIENT)
Dept: TRANSPLANT | Facility: CLINIC | Age: 54
End: 2024-07-10
Payer: COMMERCIAL

## 2024-07-10 ENCOUNTER — TELEPHONE (OUTPATIENT)
Dept: GASTROENTEROLOGY | Facility: CLINIC | Age: 54
End: 2024-07-10
Payer: COMMERCIAL

## 2024-07-10 NOTE — TELEPHONE ENCOUNTER
Pre visit planning completed.    This procedure has been approved by  with CS. Per , pt does not have Pulmonary Hypertension    -message sent to  and Post Liver Transplant LPN, should pt have an x-ray with this appt?     Per : No he is good. He had an xray a couple of days ago and stent is there.       Procedure details:    Patient scheduled for Upper endoscopy (EGD) on 7/12/2024.     Arrival time: 0830. Procedure time 0930    Facility location: [facility]; [address], [address], [place] [address]. Check in location: Main entrance at registration desk.    Sedation type: Conscious sedation     Pre op exam needed? N/A    Indication for procedure:   Encounter for removal of biliary stent [Z46.89]  - Primary      Liver replaced by transplant (H) [Z94.4]            Chart review:     Electronic implanted devices? No    Recent diagnosis of diverticulitis within the last 6 weeks? N/A    Diabetic? Yes. Diabetic medication HOLDING recommendations: Oral diabetic medications: Jardiance (empagliflozin): HOLD  3 days before procedure.   Insulin: Consult with managing provider       Medication review:    Anticoagulants? No    NSAIDS? No NSAID medications per patient's medication list.  RN will verify with pre-assessment call.    Other medication HOLDING recommendations:  N/A      Prep for procedure:     Prep instructions sent via [employer]         Liana Haas RN  Endoscopy Procedure Pre Assessment RN  [phone] option 4

## 2024-07-10 NOTE — TELEPHONE ENCOUNTER
Attempted to contact patient in order to complete pre assessment questions.     No answer. Left message to return call to [phone] option 4    Callback required communication sent via [employer].    Liana Haas RN  Endoscopy Procedure Pre Assessment

## 2024-07-10 NOTE — TELEPHONE ENCOUNTER
Spoke to patient's wife    Rescheduled: Yes,   Procedure: Upper Endoscopy [EGD]    Date: 7/12/24   Location: [facility]; [address], [place] [address]    Surgeon: Flavio   Sedation Level Scheduled  Moderate ,  Reason for Sedation Level Review   Instructions updated and sent: Shlomo     Does patient need PAC or Pre -Op Rescheduled? : No       Did you cancel or rescheduled an EUS procedure? No.

## 2024-07-10 NOTE — TELEPHONE ENCOUNTER
Call back to Adina.  She was wondering about the time for the upper endo.  I reviewed the schedule with her.

## 2024-07-11 ENCOUNTER — LAB (OUTPATIENT)
Dept: LAB | Facility: CLINIC | Age: 54
End: 2024-07-11
Payer: COMMERCIAL

## 2024-07-11 DIAGNOSIS — Z94.4 LIVER REPLACED BY TRANSPLANT (H): Chronic | ICD-10-CM

## 2024-07-11 LAB
ALBUMIN SERPL BCG-MCNC: 3.9 G/DL (ref 3.5–5.2)
ALP SERPL-CCNC: 122 U/L (ref 40–150)
ALT SERPL W P-5'-P-CCNC: 10 U/L (ref 0–70)
ANION GAP SERPL CALCULATED.3IONS-SCNC: 8 MMOL/L (ref 7–15)
AST SERPL W P-5'-P-CCNC: 22 U/L (ref 0–45)
BILIRUB DIRECT SERPL-MCNC: 0.21 MG/DL (ref 0–0.3)
BILIRUB SERPL-MCNC: 0.5 MG/DL
BUN SERPL-MCNC: 19.8 MG/DL (ref 6–20)
CALCIUM SERPL-MCNC: 9.4 MG/DL (ref 8.6–10)
CHLORIDE SERPL-SCNC: 110 MMOL/L (ref 98–107)
CREAT SERPL-MCNC: 1.24 MG/DL (ref 0.67–1.17)
CYCLOSPORINE BLD LC/MS/MS-MCNC: 152 UG/L (ref 50–400)
DEPRECATED HCO3 PLAS-SCNC: 24 MMOL/L (ref 22–29)
EGFRCR SERPLBLD CKD-EPI 2021: 70 ML/MIN/1.73M2
ERYTHROCYTE [DISTWIDTH] IN BLOOD BY AUTOMATED COUNT: 13.6 % (ref 10–15)
GLUCOSE SERPL-MCNC: 174 MG/DL (ref 70–99)
HCT VFR BLD AUTO: 33.5 % (ref 40–53)
HGB BLD-MCNC: 11.3 G/DL (ref 13.3–17.7)
MAGNESIUM SERPL-MCNC: 1.8 MG/DL (ref 1.7–2.3)
MCH RBC QN AUTO: 29.8 PG (ref 26.5–33)
MCHC RBC AUTO-ENTMCNC: 33.7 G/DL (ref 31.5–36.5)
MCV RBC AUTO: 88 FL (ref 78–100)
PHOSPHATE SERPL-MCNC: 3.8 MG/DL (ref 2.5–4.5)
PLATELET # BLD AUTO: 187 10E3/UL (ref 150–450)
POTASSIUM SERPL-SCNC: 4 MMOL/L (ref 3.4–5.3)
PROT SERPL-MCNC: 6.6 G/DL (ref 6.4–8.3)
RBC # BLD AUTO: 3.79 10E6/UL (ref 4.4–5.9)
SODIUM SERPL-SCNC: 142 MMOL/L (ref 135–145)
TME LAST DOSE: NORMAL H
TME LAST DOSE: NORMAL H
WBC # BLD AUTO: 9.6 10E3/UL (ref 4–11)

## 2024-07-11 PROCEDURE — 36415 COLL VENOUS BLD VENIPUNCTURE: CPT

## 2024-07-11 PROCEDURE — 80053 COMPREHEN METABOLIC PANEL: CPT

## 2024-07-11 PROCEDURE — 85027 COMPLETE CBC AUTOMATED: CPT

## 2024-07-11 PROCEDURE — 83735 ASSAY OF MAGNESIUM: CPT

## 2024-07-11 PROCEDURE — 84100 ASSAY OF PHOSPHORUS: CPT

## 2024-07-11 PROCEDURE — 80158 DRUG ASSAY CYCLOSPORINE: CPT

## 2024-07-11 PROCEDURE — 82248 BILIRUBIN DIRECT: CPT

## 2024-07-11 NOTE — TELEPHONE ENCOUNTER
Pre assessment completed for upcoming procedure.   (Please see previous telephone encounter notes for complete details)    Patient  returned call.       Procedure details:    Arrival time and facility location reviewed.    Pre op exam needed? N/A    Designated  policy reviewed. Instructed to have someone stay 6  hours post procedure.       Medication review:    Medications reviewed. Please see supporting documentation below. Holding recommendations discussed (if applicable).   Oral diabetic medication(s): Jardiance (empagliflozin): HOLD  3 days before procedure.  Insulin.  Consult with your managing provider.     -pt is holding Jardiance for 2 days prior to procedure, not the 3 days that is recommended. Message sent to  advising him of this    -per :That is fine.     Prep for procedure:     Procedure prep instructions reviewed.        Any additional information needed:  N/A      Patient  verbalized understanding and had no questions or concerns at this time.      Liana Haas RN  Endoscopy Procedure Pre Assessment   [phone] option 4

## 2024-07-12 ENCOUNTER — HOSPITAL ENCOUNTER (OUTPATIENT)
Facility: CLINIC | Age: 54
Discharge: HOME | End: 2024-07-12
Attending: INTERNAL MEDICINE | Admitting: INTERNAL MEDICINE
Payer: COMMERCIAL

## 2024-07-12 ENCOUNTER — PRE VISIT (OUTPATIENT)
Dept: SURGERY | Facility: CLINIC | Age: 54
End: 2024-07-12

## 2024-07-12 ENCOUNTER — APPOINTMENT (OUTPATIENT)
Dept: GENERAL RADIOLOGY | Facility: CLINIC | Age: 54
End: 2024-07-12
Attending: INTERNAL MEDICINE
Payer: COMMERCIAL

## 2024-07-12 VITALS
OXYGEN SATURATION: 95 % | RESPIRATION RATE: 16 BRPM | HEART RATE: 58 BPM | SYSTOLIC BLOOD PRESSURE: 122 MMHG | DIASTOLIC BLOOD PRESSURE: 82 MMHG

## 2024-07-12 PROBLEM — Z94.4 S/P LIVER TRANSPLANT (H): Status: RESOLVED | Noted: 2024-06-22 | Resolved: 2024-07-12

## 2024-07-12 PROBLEM — M62.81 GENERALIZED MUSCLE WEAKNESS: Status: RESOLVED | Noted: 2024-01-23 | Resolved: 2024-07-12

## 2024-07-12 PROBLEM — R60.0 BILATERAL LEG EDEMA: Status: RESOLVED | Noted: 2023-07-25 | Resolved: 2024-07-12

## 2024-07-12 PROBLEM — T86.41 LIVER TRANSPLANT REJECTION (H): Status: RESOLVED | Noted: 2024-03-15 | Resolved: 2024-07-12

## 2024-07-12 PROBLEM — N17.9 AKI (ACUTE KIDNEY INJURY) (H): Status: RESOLVED | Noted: 2024-02-23 | Resolved: 2024-07-12

## 2024-07-12 PROBLEM — I85.10 SECONDARY ESOPHAGEAL VARICES WITHOUT BLEEDING (H): Chronic | Status: RESOLVED | Noted: 2023-07-13 | Resolved: 2024-07-12

## 2024-07-12 PROBLEM — I48.91 ATRIAL FIBRILLATION WITH RVR (H): Status: RESOLVED | Noted: 2024-03-11 | Resolved: 2024-07-12

## 2024-07-12 PROBLEM — E43 SEVERE MALNUTRITION (H): Status: RESOLVED | Noted: 2023-09-06 | Resolved: 2024-07-12

## 2024-07-12 PROBLEM — G89.18 ACUTE POST-OPERATIVE PAIN: Status: RESOLVED | Noted: 2024-03-20 | Resolved: 2024-07-12

## 2024-07-12 PROBLEM — R10.9 UNSPECIFIED ABDOMINAL PAIN: Status: RESOLVED | Noted: 2024-03-31 | Resolved: 2024-07-12

## 2024-07-12 PROBLEM — D64.9 ANEMIA DUE TO UNKNOWN MECHANISM: Chronic | Status: RESOLVED | Noted: 2023-07-13 | Resolved: 2024-07-12

## 2024-07-12 PROBLEM — K76.6 PORTAL HYPERTENSIVE GASTROPATHY (H): Chronic | Status: RESOLVED | Noted: 2023-07-13 | Resolved: 2024-07-12

## 2024-07-12 PROBLEM — I86.4 GASTRIC VARICES: Chronic | Status: RESOLVED | Noted: 2023-06-27 | Resolved: 2024-07-12

## 2024-07-12 PROBLEM — R41.0 DELIRIUM: Status: RESOLVED | Noted: 2024-03-20 | Resolved: 2024-07-12

## 2024-07-12 PROBLEM — G92.8 TOXIC METABOLIC ENCEPHALOPATHY: Status: RESOLVED | Noted: 2024-06-27 | Resolved: 2024-07-12

## 2024-07-12 PROBLEM — G47.00 PERSISTENT INSOMNIA: Chronic | Status: RESOLVED | Noted: 2023-07-13 | Resolved: 2024-07-12

## 2024-07-12 PROBLEM — E87.5 HYPERKALEMIA: Status: RESOLVED | Noted: 2024-06-22 | Resolved: 2024-07-12

## 2024-07-12 PROBLEM — I27.20 PULMONARY HTN (H): Status: RESOLVED | Noted: 2024-01-30 | Resolved: 2024-07-12

## 2024-07-12 PROBLEM — K31.89 PORTAL HYPERTENSIVE GASTROPATHY (H): Chronic | Status: RESOLVED | Noted: 2023-07-13 | Resolved: 2024-07-12

## 2024-07-12 LAB
GLUCOSE BLDC GLUCOMTR-MCNC: 179 MG/DL (ref 70–99)
UPPER GI ENDOSCOPY: NORMAL

## 2024-07-12 PROCEDURE — 43235 EGD DIAGNOSTIC BRUSH WASH: CPT | Performed by: INTERNAL MEDICINE

## 2024-07-12 PROCEDURE — 82962 GLUCOSE BLOOD TEST: CPT

## 2024-07-12 PROCEDURE — 999N000065 XR ABDOMEN 1 VIEW

## 2024-07-12 PROCEDURE — 74018 RADEX ABDOMEN 1 VIEW: CPT | Mod: 26 | Performed by: RADIOLOGY

## 2024-07-12 PROCEDURE — 99153 MOD SED SAME PHYS/QHP EA: CPT | Performed by: INTERNAL MEDICINE

## 2024-07-12 PROCEDURE — 250N000011 HC RX IP 250 OP 636: Performed by: INTERNAL MEDICINE

## 2024-07-12 PROCEDURE — 44360 SMALL BOWEL ENDOSCOPY: CPT | Performed by: INTERNAL MEDICINE

## 2024-07-12 PROCEDURE — G0500 MOD SEDAT ENDO SERVICE >5YRS: HCPCS | Performed by: INTERNAL MEDICINE

## 2024-07-12 RX ORDER — FENTANYL CITRATE 50 UG/ML
INJECTION, SOLUTION INTRAMUSCULAR; INTRAVENOUS PRN
Status: DISCONTINUED | OUTPATIENT
Start: 2024-07-12 | End: 2024-07-12 | Stop reason: HOSPADM

## 2024-07-12 RX ORDER — LIDOCAINE 40 MG/G
CREAM TOPICAL
Status: DISCONTINUED | OUTPATIENT
Start: 2024-07-12 | End: 2024-07-12 | Stop reason: HOSPADM

## 2024-07-12 RX ORDER — DIPHENHYDRAMINE HYDROCHLORIDE 50 MG/ML
INJECTION INTRAMUSCULAR; INTRAVENOUS PRN
Status: DISCONTINUED | OUTPATIENT
Start: 2024-07-12 | End: 2024-07-12 | Stop reason: HOSPADM

## 2024-07-12 RX ORDER — ONDANSETRON 2 MG/ML
4 INJECTION INTRAMUSCULAR; INTRAVENOUS
Status: DISCONTINUED | OUTPATIENT
Start: 2024-07-12 | End: 2024-07-12 | Stop reason: HOSPADM

## 2024-07-12 ASSESSMENT — ACTIVITIES OF DAILY LIVING (ADL)
ADLS_ACUITY_SCORE: 37
ADLS_ACUITY_SCORE: 37

## 2024-07-12 NOTE — H&P
Mary Lopez  [id number]  male  53 year old      Reason for procedure/surgery: biliary stent removal, s/p LT    Patient Active Problem List   Diagnosis    Primary hypertension    Mild hyperlipidemia    Portal hypertensive gastropathy (H)    Secondary esophageal 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 (H)    Anemia due to unknown mechanism    Persistent insomnia    Bilateral leg edema    Alcohol use disorder, severe, in early remission (H)    Gastric varices    Severe malnutrition (H24)    Type 2 diabetes mellitus without complication, with long-term current use of insulin (H)    Generalized muscle weakness    Pulmonary HTN (H)    ANGEL (acute kidney injury) (H24)    Delirium    Liver replaced by transplant (H)    Immunosuppressed status (H24)    Liver transplant rejection (H)    Acute post-operative pain    Atrial fibrillation with RVR (H)    Hypomagnesemia    Unspecified abdominal pain    Ganglion cyst of wrist, right    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)    Hyperkalemia    S/P liver transplant (H)    Toxic metabolic encephalopathy       Past Surgical History:    Past Surgical History:   Procedure Laterality Date    BENCH LIVER  3/5/2024    Procedure: Bench liver;  Surgeon: Ryder Cortez MD;  Location: [facility] OR    CHOLECYSTECTOMY      COLONOSCOPY N/A 2024    Procedure: COLONOSCOPY, WITH POLYPECTOMY;  Surgeon: Jak Urbina MD;  Location: [facility]    CV RIGHT HEART CATH MEASUREMENTS RECORDED N/A 2024    Procedure: Right Heart Catheterization;  Surgeon: Alfred Tafoya MD;  Location:  HEART CARDIAC CATH LAB    IR LIVER BIOPSY PERCUTANEOUS  3/15/2024    IR RETROPERITONEAL ABSCESS DRAINAGE  2024    TONSILLECTOMY      TRANSPLANT LIVER RECIPIENT  DONOR N/A 3/5/2024    Procedure: Transplant liver recipient  donor, bile duct stent placement;  Surgeon: Ryder Cortez MD;  Location: [facility] OR    VASECTOMY         Past Medical History:   Past Medical History:    Diagnosis Date    ANGEL (acute kidney injury) (H24)     Alcoholic cirrhosis of liver without ascites (H) 07/13/2023    Alcoholic hepatitis with ascites (H28) 10/03/2023    Alcoholic hepatitis without ascites (H28) 07/13/2023    Closed fracture of one rib of left side 09/14/2023    Concussion without loss of consciousness 03/11/2020    Decompensated hepatic cirrhosis (H) 09/15/2023    Diabetes mellitus, type 2 (H) 11/22/2023    Essential hypertension 03/11/2020    Ganglion cyst of wrist, right 04/18/2024    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)     Liver replaced by transplant (H) 03/05/2024    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    ACR PRAJAPATI 6-7/9, treated with steroids    Mild hyperlipidemia 12/07/2021    Persistent insomnia 07/13/2023    Portal hypertension (H) 07/13/2023    Scrotal abscess     Secondary esophageal 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 (H) 07/13/2023    Tobacco abuse disorder 03/11/2020    Type 2 diabetes mellitus with hyperglycemia (H) 12/22/2023       Social History:   Social History     Tobacco Use    Smoking status: Former     Types: Cigarettes     Passive exposure: Never    Smokeless tobacco: Current     Types: Chew     Last attempt to quit: 1/1/2004    Tobacco comments:     Chew daily 1/8 of a tin per day   Substance Use Topics    Alcohol use: Not Currently     Alcohol/week: 12.0 standard drinks of alcohol     Types: 12 Standard drinks or equivalent per week     Comment: Sober since 6/25/2023       Family History:   Family History   Problem Relation Age of Onset    Anxiety Disorder Mother     Depression Mother     Bipolar Disorder Mother     Chronic Obstructive Pulmonary Disease Mother     Lung Cancer Mother 81    Morbid Obesity Father     Diabetes Father     Diabetes Type 2  Brother     Substance Abuse Maternal Grandfather     Substance Abuse Paternal Grandfather     Colon Cancer No family hx of     Liver Disease No family hx of        Allergies:   Allergies   Allergen Reactions     Other Food Allergy Anaphylaxis     Legumes (black beans, baked beans, chickpeas)    Pineapple Itching       Active Medications:   No current outpatient medications on file.       Systemic Review:   CONSTITUTIONAL: NEGATIVE for fever, chills, change in weight  ENT/MOUTH: NEGATIVE for ear, mouth and throat problems  RESP: NEGATIVE for significant cough or SOB  CV: NEGATIVE for chest pain, palpitations or peripheral edema    Physical Examination:   Vital Signs: BP (!) 130/91   Pulse 70   Resp 16   SpO2 100%   GENERAL: healthy, alert and no distress  NECK: no adenopathy, no asymmetry, masses, or scars  RESP: lungs clear to auscultation - no rales, rhonchi or wheezes  CV: regular rate and rhythm, normal S1 S2, no S3 or S4, no murmur, click or rub, no peripheral edema and peripheral pulses strong  ABDOMEN: soft, nontender, no hepatosplenomegaly, no masses and bowel sounds normal  MS: no gross musculoskeletal defects noted, no edema    Plan: Appropriate to proceed as scheduled.      Hugo Muniz MD  7/12/2024    PCP:  Jimmie Hoyt

## 2024-07-12 NOTE — OR NURSING
Pt tolerated EGD for stent removal, S/P liver transplant,. Stent was not seen with the regular EGD.  Also did an enteroscopy  and were still unable to locate the stent. Pt to have an Xray while in recovery

## 2024-07-15 ENCOUNTER — TELEPHONE (OUTPATIENT)
Dept: TRANSPLANT | Facility: CLINIC | Age: 54
End: 2024-07-15

## 2024-07-15 ENCOUNTER — LAB (OUTPATIENT)
Dept: LAB | Facility: CLINIC | Age: 54
End: 2024-07-15
Payer: COMMERCIAL

## 2024-07-15 DIAGNOSIS — Z94.4 LIVER TRANSPLANTED (H): Primary | ICD-10-CM

## 2024-07-15 DIAGNOSIS — Z94.4 LIVER REPLACED BY TRANSPLANT (H): Chronic | ICD-10-CM

## 2024-07-15 DIAGNOSIS — K43.9 HERNIA OF ABDOMINAL WALL: ICD-10-CM

## 2024-07-15 LAB
ALBUMIN SERPL BCG-MCNC: 3.9 G/DL (ref 3.5–5.2)
ALP SERPL-CCNC: 126 U/L (ref 40–150)
ALT SERPL W P-5'-P-CCNC: 20 U/L (ref 0–70)
ANION GAP SERPL CALCULATED.3IONS-SCNC: 10 MMOL/L (ref 7–15)
AST SERPL W P-5'-P-CCNC: 30 U/L (ref 0–45)
BILIRUB DIRECT SERPL-MCNC: <0.2 MG/DL (ref 0–0.3)
BILIRUB SERPL-MCNC: 0.5 MG/DL
BUN SERPL-MCNC: 25.2 MG/DL (ref 6–20)
CALCIUM SERPL-MCNC: 9.1 MG/DL (ref 8.6–10)
CHLORIDE SERPL-SCNC: 107 MMOL/L (ref 98–107)
CREAT SERPL-MCNC: 1.19 MG/DL (ref 0.67–1.17)
CYCLOSPORINE BLD LC/MS/MS-MCNC: 200 UG/L (ref 50–400)
EGFRCR SERPLBLD CKD-EPI 2021: 73 ML/MIN/1.73M2
ERYTHROCYTE [DISTWIDTH] IN BLOOD BY AUTOMATED COUNT: 14.1 % (ref 10–15)
GLUCOSE SERPL-MCNC: 160 MG/DL (ref 70–99)
HCO3 SERPL-SCNC: 24 MMOL/L (ref 22–29)
HCT VFR BLD AUTO: 34.2 % (ref 40–53)
HGB BLD-MCNC: 11.5 G/DL (ref 13.3–17.7)
MAGNESIUM SERPL-MCNC: 1.6 MG/DL (ref 1.7–2.3)
MCH RBC QN AUTO: 29.9 PG (ref 26.5–33)
MCHC RBC AUTO-ENTMCNC: 33.6 G/DL (ref 31.5–36.5)
MCV RBC AUTO: 89 FL (ref 78–100)
PHOSPHATE SERPL-MCNC: 4.1 MG/DL (ref 2.5–4.5)
PLATELET # BLD AUTO: 198 10E3/UL (ref 150–450)
POTASSIUM SERPL-SCNC: 4.4 MMOL/L (ref 3.4–5.3)
PROT SERPL-MCNC: 6.4 G/DL (ref 6.4–8.3)
RBC # BLD AUTO: 3.85 10E6/UL (ref 4.4–5.9)
SODIUM SERPL-SCNC: 141 MMOL/L (ref 135–145)
TME LAST DOSE: NORMAL H
TME LAST DOSE: NORMAL H
WBC # BLD AUTO: 11.1 10E3/UL (ref 4–11)

## 2024-07-15 PROCEDURE — 84100 ASSAY OF PHOSPHORUS: CPT

## 2024-07-15 PROCEDURE — 80158 DRUG ASSAY CYCLOSPORINE: CPT

## 2024-07-15 PROCEDURE — 82248 BILIRUBIN DIRECT: CPT

## 2024-07-15 PROCEDURE — 85027 COMPLETE CBC AUTOMATED: CPT

## 2024-07-15 PROCEDURE — 36415 COLL VENOUS BLD VENIPUNCTURE: CPT

## 2024-07-15 PROCEDURE — 80053 COMPREHEN METABOLIC PANEL: CPT

## 2024-07-15 PROCEDURE — 83735 ASSAY OF MAGNESIUM: CPT

## 2024-07-15 NOTE — TELEPHONE ENCOUNTER
"Call from Dr. Muniz on 7/12 letting me know that he was unable to find Mary's biliary stent per endoscopy.  He thinks it may have migrated, would like CT abdomen to identify stent position.   Wife Adina had already sent me a staff message about this but I called her to ask her to call to schedule.  Mary states that he is feeling some intermittent pain on his right side of his pubic area.    He is also  having shooting pain from his belly button down.  The whole area below his belly button is very sore.  He see's an obvious lump.  When he lays down the lump does not go away but the pain does.  If he's up walking around the pain gets worse and worse until he sits down.  No fever.  He also has pain by his beltline - it feels like these are 2 different pains. \"But lately I've hit the ground running and I think I'm overdoing it\" - he has a hard time stopping working, since he has been feeling so much better.    I asked Mary to try to slow down, limit lifting a bit but have a low threshold to call me if the pain worsens, go to ER if pain becomes severe.    I sent a scheduling request asking for an appt w/ Dr. Cortez in a week or 3 to assess for inguinal hernia  "

## 2024-07-16 ENCOUNTER — VIRTUAL VISIT (OUTPATIENT)
Dept: FAMILY MEDICINE | Facility: CLINIC | Age: 54
End: 2024-07-16
Payer: COMMERCIAL

## 2024-07-16 DIAGNOSIS — Z94.4 LIVER REPLACED BY TRANSPLANT (H): Chronic | ICD-10-CM

## 2024-07-16 DIAGNOSIS — E11.9 TYPE 2 DIABETES MELLITUS WITHOUT COMPLICATION, WITH LONG-TERM CURRENT USE OF INSULIN (H): Chronic | ICD-10-CM

## 2024-07-16 DIAGNOSIS — D84.9 IMMUNOSUPPRESSED STATUS (H): Chronic | ICD-10-CM

## 2024-07-16 DIAGNOSIS — R10.30 LOWER ABDOMINAL PAIN: Primary | ICD-10-CM

## 2024-07-16 DIAGNOSIS — Z79.4 TYPE 2 DIABETES MELLITUS WITHOUT COMPLICATION, WITH LONG-TERM CURRENT USE OF INSULIN (H): Chronic | ICD-10-CM

## 2024-07-16 DIAGNOSIS — G47.00 PERSISTENT INSOMNIA: Chronic | ICD-10-CM

## 2024-07-16 PROCEDURE — 99214 OFFICE O/P EST MOD 30 MIN: CPT | Mod: 95 | Performed by: INTERNAL MEDICINE

## 2024-07-16 NOTE — PROGRESS NOTES
"  If patient has telephone visit, have they been educated on video visit as preferred visit method and offered to change to video visit? N/A        Instructions Relayed to Patient by Virtual Roomer:     Patient is active on [employer]:   Relayed following to patient: \"It looks like you are active on [employer], are you able to join the visit this way? If not, do you need us to send you a link now or would you like your provider to send a link via text or email when they are ready to initiate the visit?\"      Patient Confirmed they will join visit via: [employer]  Reminded patient to ensure they were logged on to virtual visit by arrival time listed.   Asked if patient has flexibility to initiate visit sooner than arrival time: patient stated yes, documented in appointment notes availability to initiate visit earlier than arrival time     If pediatric virtual visit, ensured pediatric patient along with parent/guardian will be present for video visit.     Patient offered the website [email or web address] and/or phone number to [employer] Help line: [phone]    Mary is a 53 year old who is being evaluated via a billable video visit.    How would you like to obtain your AVS? [email or web address]  If the video visit is dropped, the invitation should be resent by: Text to cell phone: [phone]  Will anyone else be joining your video visit? No      Assessment & Plan     1.  Lower abdominal pain.  Appears to be both left and right lower quadrant with some bulging in the groin suggestive of hernia.  Patient has a CT scan scheduled on Monday.  Offered to see him sooner in the clinic but patient decided to wait and see what the CAT scan shows.  2.  Liver replaced by transplant on this 03/05/2024.  Since recent hospitalization and switching to cyclosporine he has done better.  More energetic.  3.  Persistent insomnia.  Improved since cut back quetiapine to 25 mg a day.  Advised to reduce it to 12.5 mg a day and see how he " "does.  4.  Immunosuppressed status.  5.  Type 2 diabetes mellitus uncomplicated.  Blood sugars have been better.  Currently on empagliflozin, Tresiba and lispro.  6.  Recent upper endoscopy on 07/12/2024 normal.        MED REC REQUIRED  Post Medication Reconciliation Status:   BMI  Estimated body mass index is 28.57 kg/m  as calculated from the following:    Height as of 6/21/24: 1.74 m (5' 8.5\").    Weight as of 7/2/24: 86.5 kg (190 lb 11.2 oz).             Elmo Hernandez is a 53 year old, presenting for the following health issues:  Hospital F/U      7/16/2024     2:45 PM   Additional Questions   Roomed by Leonor EPPERSON   Accompanied by Wife Rosio         7/16/2024     2:45 PM   Patient Reported Additional Medications   Patient reports taking the following new medications None     HPI       Patient scheduled a follow-up virtual visit today.  Get an update on his current status.  He has been hospitalized the last month on 6/22/2024 with toxic metabolic encephalopathy and ANGEL.  He was resuscitated and his antirejection medication was switched over to cyclosporine.  Since then he has done better.  His renal function and anemia are both improved.  He is more energetic and doing more work around the house.  Today he has been off today.  For the past several days he has been having lower abdominal pain or discomfort and having noticed some bulge in the groin.  Wondering about hernia.  CT scan abdomen pelvis has been scheduled for next Monday.  He is sleeping much better.  He has cut back Seroquel.  25 mg at bedtime.  His blood sugars have been okay he continues to take empagliflozin, Tresiba and short acting insulin lispro.      Review of Systems  Constitutional, HEENT, cardiovascular, pulmonary, GI, , musculoskeletal, neuro, skin, endocrine and psych systems are negative, except as otherwise noted.      Objective           Vitals:  No vitals were obtained today due to virtual visit.    Physical Exam   GENERAL: " alert and no distress  EYES: Eyes grossly normal to inspection.  No discharge or erythema, or obvious scleral/conjunctival abnormalities.  RESP: No audible wheeze, cough, or visible cyanosis.    SKIN: Visible skin clear. No significant rash, abnormal pigmentation or lesions.  NEURO: Cranial nerves grossly intact.  Mentation and speech appropriate for age.  PSYCH: Appropriate affect, tone, and pace of words          Video-Visit Details    Type of service:  Video Visit   Originating Location (pt. Location): Home    Distant Location (provider location):  On-site  Platform used for Video Visit: [employer]  Signed Electronically by: Jimmie Hoyt MD

## 2024-07-18 ENCOUNTER — OFFICE VISIT (OUTPATIENT)
Dept: URGENT CARE | Facility: URGENT CARE | Age: 54
End: 2024-07-18
Payer: COMMERCIAL

## 2024-07-18 VITALS
RESPIRATION RATE: 16 BRPM | BODY MASS INDEX: 27.84 KG/M2 | DIASTOLIC BLOOD PRESSURE: 84 MMHG | TEMPERATURE: 98 F | SYSTOLIC BLOOD PRESSURE: 129 MMHG | WEIGHT: 185.8 LBS | HEART RATE: 82 BPM | OXYGEN SATURATION: 98 %

## 2024-07-18 DIAGNOSIS — H61.23 EXCESSIVE CERUMEN IN EAR CANAL, BILATERAL: ICD-10-CM

## 2024-07-18 DIAGNOSIS — H92.03 EAR PAIN, BILATERAL: Primary | ICD-10-CM

## 2024-07-18 DIAGNOSIS — R07.0 THROAT PAIN: ICD-10-CM

## 2024-07-18 LAB
DEPRECATED S PYO AG THROAT QL EIA: NEGATIVE
GROUP A STREP BY PCR: NOT DETECTED

## 2024-07-18 PROCEDURE — 99213 OFFICE O/P EST LOW 20 MIN: CPT | Mod: 25 | Performed by: NURSE PRACTITIONER

## 2024-07-18 PROCEDURE — 87651 STREP A DNA AMP PROBE: CPT | Performed by: NURSE PRACTITIONER

## 2024-07-18 PROCEDURE — 69210 REMOVE IMPACTED EAR WAX UNI: CPT | Performed by: NURSE PRACTITIONER

## 2024-07-18 NOTE — PROGRESS NOTES
Assessment & Plan      Diagnosis Comments   1. Ear pain, bilateral  Normal ear exam bilateral      2. Excessive cerumen in ear canal, bilateral  REMOVE IMPACTED CERUMEN mild amount       3. Throat pain  Streptococcus A Rapid Screen w/Reflex to PCR - Clinic Collect, Group A Streptococcus PCR Throat Swab Pending strep culture        Discussed symptoms may be related to sinus inflammation May try using Zyrtec OTC see if this will help symptoms.  He also had some mild pain with palpation right mandible and masseter region no masses lumps or other abnormality noted he did have a dentist appointment today that he canceled discussed following up with dentist if dental pain arises.  He does have multiple on the back on that side.  I did not visualize or palpate any abscess in this region.  Medication for infectious process at this time we will have patient continue to monitor symptoms closely  Follow-up with his primary care provider as needed    [facility]     Mary is a 53 year old male who presents to clinic today for the following health issues:  Chief Complaint   Patient presents with    Ear Problem     Right ear pain       HPI    Patient presents to clinic with bilateral ear pain right greater than left stating symptom started about 3 days ago.  He also states he is having mild pain anterior to right ear with pressure.  Patient denies cough, shortness of breath, fever, sinus congestion mild throat erythema noted.  History of liver transplant labs have been stable he does follow hepatology    Review of Systems  Constitutional, HEENT, cardiovascular, pulmonary, gi and gu systems are negative, except as otherwise noted.      Objective    /84   Pulse 82   Temp 98  F (36.7  C) (Oral)   Resp 16   Wt 84.3 kg (185 lb 12.8 oz)   SpO2 98%   BMI 27.84 kg/m    Physical Exam   GENERAL: alert and no distress  EYES: Eyes grossly normal to inspection,  PERRL and conjunctivae and sclerae normal  HENT: normal cephalic/atraumatic, both ears: occluded with wax and lavage was performed by MA and reexamined by provider with normal bilateral TMs negative for erythema or swelling, nose and mouth without ulcers or lesions, oropharynx clear, and oral mucous membranes moist  NECK: no adenopathy, no asymmetry, masses, or scars  RESP: lungs clear to auscultation - no rales, rhonchi or wheezes  CV: regular rate and rhythm, normal S1 S2, no S3 or S4, no murmur, click or rub, no peripheral edema  ABDOMEN: soft, nontender, no hepatosplenomegaly, no masses and bowel sounds normal  MS: no gross musculoskeletal defects noted, no edema  SKIN: no suspicious lesions or rashes

## 2024-07-19 ENCOUNTER — TELEPHONE (OUTPATIENT)
Dept: TRANSPLANT | Facility: CLINIC | Age: 54
End: 2024-07-19
Payer: COMMERCIAL

## 2024-07-19 NOTE — TELEPHONE ENCOUNTER
Call  to Mary and Adina to check in and see h ow they are doing.  They are on their way to [place].  Mary is feeling well.  He did have some headaches and brain fog, spoke to his DrSimone Who told him to cut seroquel in half - he did this for 2 days and has now stopped.  Told him to discuss this w/ his PCP.   Denies belly pain / fever.

## 2024-07-22 ENCOUNTER — LAB (OUTPATIENT)
Dept: LAB | Facility: CLINIC | Age: 54
End: 2024-07-22
Payer: COMMERCIAL

## 2024-07-22 DIAGNOSIS — Z94.4 LIVER REPLACED BY TRANSPLANT (H): Chronic | ICD-10-CM

## 2024-07-22 LAB
ALBUMIN SERPL BCG-MCNC: 4 G/DL (ref 3.5–5.2)
ALP SERPL-CCNC: 155 U/L (ref 40–150)
ALT SERPL W P-5'-P-CCNC: 25 U/L (ref 0–70)
ANION GAP SERPL CALCULATED.3IONS-SCNC: 8 MMOL/L (ref 7–15)
AST SERPL W P-5'-P-CCNC: 30 U/L (ref 0–45)
BILIRUB DIRECT SERPL-MCNC: 0.41 MG/DL (ref 0–0.3)
BILIRUB SERPL-MCNC: 1.1 MG/DL
BUN SERPL-MCNC: 26.2 MG/DL (ref 6–20)
CALCIUM SERPL-MCNC: 9.5 MG/DL (ref 8.8–10.4)
CHLORIDE SERPL-SCNC: 103 MMOL/L (ref 98–107)
CREAT SERPL-MCNC: 1.3 MG/DL (ref 0.67–1.17)
CYCLOSPORINE BLD LC/MS/MS-MCNC: 297 UG/L (ref 50–400)
EGFRCR SERPLBLD CKD-EPI 2021: 66 ML/MIN/1.73M2
ERYTHROCYTE [DISTWIDTH] IN BLOOD BY AUTOMATED COUNT: 13.5 % (ref 10–15)
GLUCOSE SERPL-MCNC: 245 MG/DL (ref 70–99)
HCO3 SERPL-SCNC: 27 MMOL/L (ref 22–29)
HCT VFR BLD AUTO: 33.6 % (ref 40–53)
HGB BLD-MCNC: 11.3 G/DL (ref 13.3–17.7)
MAGNESIUM SERPL-MCNC: 2 MG/DL (ref 1.7–2.3)
MCH RBC QN AUTO: 29.7 PG (ref 26.5–33)
MCHC RBC AUTO-ENTMCNC: 33.6 G/DL (ref 31.5–36.5)
MCV RBC AUTO: 88 FL (ref 78–100)
PHOSPHATE SERPL-MCNC: 4.1 MG/DL (ref 2.5–4.5)
PLATELET # BLD AUTO: 196 10E3/UL (ref 150–450)
POTASSIUM SERPL-SCNC: 5 MMOL/L (ref 3.4–5.3)
PROT SERPL-MCNC: 7 G/DL (ref 6.4–8.3)
RBC # BLD AUTO: 3.8 10E6/UL (ref 4.4–5.9)
SODIUM SERPL-SCNC: 138 MMOL/L (ref 135–145)
TME LAST DOSE: NORMAL H
TME LAST DOSE: NORMAL H
WBC # BLD AUTO: 8.4 10E3/UL (ref 4–11)

## 2024-07-22 PROCEDURE — 80158 DRUG ASSAY CYCLOSPORINE: CPT

## 2024-07-22 PROCEDURE — 36415 COLL VENOUS BLD VENIPUNCTURE: CPT

## 2024-07-22 PROCEDURE — 84100 ASSAY OF PHOSPHORUS: CPT

## 2024-07-22 PROCEDURE — 85027 COMPLETE CBC AUTOMATED: CPT

## 2024-07-22 PROCEDURE — 80053 COMPREHEN METABOLIC PANEL: CPT

## 2024-07-22 PROCEDURE — 82248 BILIRUBIN DIRECT: CPT

## 2024-07-22 PROCEDURE — 83735 ASSAY OF MAGNESIUM: CPT

## 2024-07-23 ENCOUNTER — MYC REFILL (OUTPATIENT)
Dept: TRANSPLANT | Facility: CLINIC | Age: 54
End: 2024-07-23

## 2024-07-23 ENCOUNTER — VIRTUAL VISIT (OUTPATIENT)
Dept: PHARMACY | Facility: CLINIC | Age: 54
End: 2024-07-23
Payer: COMMERCIAL

## 2024-07-23 DIAGNOSIS — E83.42 HYPOMAGNESEMIA: ICD-10-CM

## 2024-07-23 DIAGNOSIS — Z94.4 S/P LIVER TRANSPLANT (H): ICD-10-CM

## 2024-07-23 DIAGNOSIS — Z94.4 LIVER REPLACED BY TRANSPLANT (H): Primary | ICD-10-CM

## 2024-07-23 DIAGNOSIS — Z79.4 TYPE 2 DIABETES MELLITUS WITHOUT COMPLICATION, WITH LONG-TERM CURRENT USE OF INSULIN (H): ICD-10-CM

## 2024-07-23 DIAGNOSIS — K21.9 GASTROESOPHAGEAL REFLUX DISEASE, UNSPECIFIED WHETHER ESOPHAGITIS PRESENT: ICD-10-CM

## 2024-07-23 DIAGNOSIS — E87.5 HYPERKALEMIA: ICD-10-CM

## 2024-07-23 DIAGNOSIS — E11.9 TYPE 2 DIABETES MELLITUS WITHOUT COMPLICATION, WITH LONG-TERM CURRENT USE OF INSULIN (H): ICD-10-CM

## 2024-07-23 PROCEDURE — 99606 MTMS BY PHARM EST 15 MIN: CPT | Mod: 93 | Performed by: PHARMACIST

## 2024-07-23 RX ORDER — CYCLOSPORINE 100 MG/1
200 CAPSULE, LIQUID FILLED ORAL EVERY 12 HOURS
Qty: 120 CAPSULE | Refills: 11 | Status: ON HOLD | OUTPATIENT
Start: 2024-07-23 | End: 2024-08-13

## 2024-07-23 RX ORDER — MAGNESIUM GLYCINATE 100 MG
200 TABLET ORAL 2 TIMES DAILY
Qty: 360 TABLET | Refills: 3 | Status: SHIPPED | OUTPATIENT
Start: 2024-07-23 | End: 2024-08-19

## 2024-07-23 RX ORDER — CYCLOSPORINE 25 MG/1
75 CAPSULE, LIQUID FILLED ORAL EVERY 12 HOURS
Qty: 180 CAPSULE | Refills: 3 | Status: ON HOLD | OUTPATIENT
Start: 2024-07-23 | End: 2024-08-13

## 2024-07-23 RX ORDER — CYCLOSPORINE 100 MG/1
300 CAPSULE, LIQUID FILLED ORAL EVERY 12 HOURS
Qty: 180 CAPSULE | Refills: 11 | Status: SHIPPED | OUTPATIENT
Start: 2024-07-23 | End: 2024-07-23

## 2024-07-23 NOTE — TELEPHONE ENCOUNTER
ISSUE:   Cyclosporine level 297 on 7/22, goal 150-200, dose 300 mg BID.    PLAN:   Call Patient and confirm this was an accurate 12-hour trough.   Verify Tacrolimus IR dose.  Confirm no new medications or or missed doses.   Confirm no new illness / infection / diarrhea.   If accurate trough and accurate dose, decrease Cyclosporine dose to 275 mg BID     Repeat labs in 2 weeks.  Lore Mckeon, RN     OUTCOME:   Spoke with [facility], they confirm accurate trough level and current dose 300 mg BID.   Rosio confirmed dose change to 275 mg BID.  Rosio agreed to repeat labs in 2 weeks.   Orders sent to preferred pharmacy for dose change and lab for repeat labs.   Rosio voiced understanding of plan.      Valentina Enamorado LPN

## 2024-07-23 NOTE — PATIENT INSTRUCTIONS
"Recommendations from today's MTM visit:                                                      Reduce omeprazole 20mg every other day for 30 days when you stop Myfortic, then stop. Can use for Famotidine/Pepcid AC if you have rebound GERD sx.     Follow-up: as needed     It was great speaking with you today.  I value your experience and would be very thankful for your time in providing feedback in our clinic survey. In the next few days, you may receive an email or text message from [employer] with a link to a survey related to your  clinical pharmacist.\"     To schedule another MTM appointment, please call the clinic directly or you may call the MTM scheduling line at [phone] or toll-free at [phone].     My Clinical Pharmacist's contact information:                                                      Please feel free to contact me with any questions or concerns you have.      Fredy Osborn, PharmD  MTM Pharmacist    Phone: [phone]     "

## 2024-07-23 NOTE — PROGRESS NOTES
Medication Therapy Management (MTM) Encounter    ASSESSMENT:                            Medication Adherence/Access: No issues identified    Liver Transplant:    Stable.     Supplements:   Stable. Refill sent for Magnesium glycinate.     Hyperkalemia:   Stable.    Diabetes   Stable.BG well controlled <150 for the most part per patient.     GERD    No indication for omeprazole currently. Can start tapering when off of Myfortic.     PLAN:                            Reduce omeprazole 20mg every other day for 30 days when you stop Myfortic, then stop. Can use for Famotidine/Pepcid AC if you have rebound GERD sx.     Follow-up: as needed    SUBJECTIVE/OBJECTIVE:                          Mary Lopez is a 53 year old male seen for a follow-up visit. Patient was accompanied by Adina.      Reason for visit: 4 months post liver txp.    Allergies/ADRs: Reviewed in chart  Past Medical History: Reviewed in chart  Tobacco: He reports that he has quit smoking. His smoking use included cigarettes. He has never been exposed to tobacco smoke. His smokeless tobacco use includes chew.  Alcohol: not currently using    Medication Adherence/Access: no issues reported    Liver Transplant:    Cyclosporine 275 mg twice daily. Switched due to mental/ confusion with Tacrolimus.   Mycophenolic acid 180 mg twice daily stopping on Sunday.   Pt reports no issues.   Transplant date: 3/5/24  Vascular Prophylaxis: Aspirin 325mg daily. Denies gastrointestinal bleed sx.   Other meds: has not been taking Ursodiol  CMV prophylaxis: CrCl >40 mL/minute: Valcyte 450 mg once daily Treat 3 months post tx.   PJP prophylaxis: Dapsone 50mg daily for 6 months post txp  Tx Coordinator: Elvin Mayorga MD: Dr. Cortez, Dr. Muniz, Using Med Card: Yes  Immunizations: annual flu shot 2023; [id number]:  NA; Prevnar 20: 2023; TDaP:  2017; Shingrix: x2, HBV: no immunity, COVID: Primary x 3 and 23-24 2023    Estimated Creatinine Clearance: 71 mL/min (A) (based on  SCr of 1.3 mg/dL (H)).     Supplements:   Mag glycinate 200mg 2 TIMES DAILY.  MVI daily  Lab Results   Component Value Date    MAG 2.0 2024    MAG 1.6 (L) 07/15/2024     Hyperkalemia:   Lokelma 10g PRN- has not needed yet.   Florinef 0.1mg three times per week.   Lab Results   Component Value Date    POTASSIUM 5.0 2024     Diabetes   Jardiance 25mg daily.   Semglee 13 units daily  Humalog 4 units + sliding scale over 140, 1 units:50.   Patient is not experiencing side effects.  Blood sugar monitoring: Continuous Glucose Monitor Dexcom   Fastin-140s  Before meals: 130-170s  AFter meals: low 200s.   Current diabetes symptoms: Low sugars last night, down to 70.   Eye exam is up to date    GERD    Omeprazole 20 mg daily   Patient reports no heartburn  Patient feels that current regimen is effective.    Today's Vitals: There were no vitals taken for this visit.  ----------------    I spent 12 minutes with this patient today. All changes were made via collaborative practice agreement with Dr. Muniz. A copy of the visit note was provided to the patient's provider(s).    A summary of these recommendations was given to the patient.    José Manuel LarryD  [facility] Pharmacist    Phone: [phone]     Telemedicine Visit Details  Type of service:  Telephone visit  Start Time: 12:26 PM  End Time: 12:39 PM     Medication Therapy Recommendations  Gastroesophageal reflux disease, unspecified whether esophagitis present    Current Medication: omeprazole (PRILOSEC) 20 MG DR capsule   Rationale: No medical indication at this time - Unnecessary medication therapy - Indication   Recommendation: Discontinue Medication   Status: Accepted - no CPA Needed            MED REC REQUIRED  Post Medication Reconciliation Status: discharge medications reconciled and changed, per note/orders

## 2024-07-24 ENCOUNTER — ANCILLARY PROCEDURE (OUTPATIENT)
Dept: CT IMAGING | Facility: CLINIC | Age: 54
End: 2024-07-24
Attending: INTERNAL MEDICINE
Payer: COMMERCIAL

## 2024-07-24 DIAGNOSIS — Z94.4 LIVER TRANSPLANTED (H): ICD-10-CM

## 2024-07-24 PROCEDURE — 74177 CT ABD & PELVIS W/CONTRAST: CPT | Performed by: RADIOLOGY

## 2024-07-24 RX ORDER — IOPAMIDOL 755 MG/ML
122 INJECTION, SOLUTION INTRAVASCULAR ONCE
Status: COMPLETED | OUTPATIENT
Start: 2024-07-24 | End: 2024-07-24

## 2024-07-24 RX ADMIN — IOPAMIDOL 122 ML: 755 INJECTION, SOLUTION INTRAVASCULAR at 09:03

## 2024-07-25 ENCOUNTER — PATIENT OUTREACH (OUTPATIENT)
Dept: GASTROENTEROLOGY | Facility: CLINIC | Age: 54
End: 2024-07-25
Payer: COMMERCIAL

## 2024-07-25 ENCOUNTER — PREP FOR PROCEDURE (OUTPATIENT)
Dept: GASTROENTEROLOGY | Facility: CLINIC | Age: 54
End: 2024-07-25
Payer: COMMERCIAL

## 2024-07-25 ENCOUNTER — MYC MEDICAL ADVICE (OUTPATIENT)
Dept: TRANSPLANT | Facility: CLINIC | Age: 54
End: 2024-07-25
Payer: COMMERCIAL

## 2024-07-25 DIAGNOSIS — Z46.89 ENCOUNTER FOR REMOVAL OF BILIARY STENT: Primary | ICD-10-CM

## 2024-07-25 NOTE — TELEPHONE ENCOUNTER
Called patient to offer ERCP    Please assist in scheduling:     Procedure/Imaging/Clinic: ERCP  Physician: Michael  Timin/13  Scope time needed:tbd  Anesthesia:General  Dx: biliary stent removal  Tier:Tier 3 -   Surgeries/procedures that can be delayed  between 30 to 90 days with no significant  morbidity/mortality to patient or impact on  patient/disease outcome.   Location: [facility]  Header of letter for pt communication: Endoscopic Retrograde Cholangiopancreatography (ERCP)        Comments:     Called to discuss with patients wife.     Explained they can expect a call from  for date of procedure, OR will call 1-2 days prior to procedure date with arrival time, will need a , someone to stay with them for 24 hours and should stay in town for 24 hours (within 45 min of Hospital) post procedure    Patient needs to get pre-op physical completed. If outside [facility] system will need physical faxed to number [phone]     If you do not get a preop physical, your procedure could be cancelled, patient voiced understanding*    Preop Plan:can use H&P from Dr Muniz 2024    Does patient have any history of gastric bypass/gastric surgery/altered panc/bili anatomy?no    Any recent Covid symptoms or positive covid test?no    Does patient have [employer] insurance?:no    Med Review    Blood thinner -  no  ASA - yes, ok to continue  Diabetic - no  Any meds by injection or mouth for weight loss or diabetes-no    Patient Education r/t procedure:done    A pre-op nurse will call 1-2 days prior to the procedure.    NPO/Prep:   Adults and Children of all ages may consume solids up to 8 hours prior to arrival time - may consume clear liquids up to 1 hour prior to arrival time.    Other specific details/comments:none     Verbalized understanding of all instructions. All questions answered.     Procedure order placed, message routed to OR / Endo

## 2024-07-26 ENCOUNTER — TELEPHONE (OUTPATIENT)
Dept: ENDOCRINOLOGY | Facility: CLINIC | Age: 54
End: 2024-07-26

## 2024-07-26 ENCOUNTER — TELEPHONE (OUTPATIENT)
Dept: TRANSPLANT | Facility: CLINIC | Age: 54
End: 2024-07-26

## 2024-07-26 ENCOUNTER — VIRTUAL VISIT (OUTPATIENT)
Dept: EDUCATION SERVICES | Facility: CLINIC | Age: 54
End: 2024-07-26
Payer: COMMERCIAL

## 2024-07-26 VITALS — WEIGHT: 183 LBS | BODY MASS INDEX: 27.42 KG/M2

## 2024-07-26 DIAGNOSIS — Z79.4 TYPE 2 DIABETES MELLITUS WITHOUT COMPLICATION, WITH LONG-TERM CURRENT USE OF INSULIN (H): Primary | Chronic | ICD-10-CM

## 2024-07-26 DIAGNOSIS — E11.9 TYPE 2 DIABETES MELLITUS WITHOUT COMPLICATION, WITH LONG-TERM CURRENT USE OF INSULIN (H): Primary | Chronic | ICD-10-CM

## 2024-07-26 PROCEDURE — G0108 DIAB MANAGE TRN  PER INDIV: HCPCS | Mod: 95 | Performed by: NUTRITIONIST

## 2024-07-26 RX ORDER — DIABETIC SUPPLIES,MISCELL
1 MISCELLANEOUS MISCELLANEOUS ONCE
Qty: 1 EACH | Refills: 0 | Status: SHIPPED | OUTPATIENT
Start: 2024-07-26 | End: 2024-07-26

## 2024-07-26 NOTE — TELEPHONE ENCOUNTER
Received a message from Dr. Cortez to send him to ED now for evaluation of inguinal hernia.    Called to communicate this to the patient- patient refuses at this time, states that he appreciates the call but he will lay low over the weekend and see Dr. Cortez on Monday. Impressed upon the patient Dr. Cortez's recommendation, again refused. Reiterated that patient must be seen in ED asap for any change in vital signs, fever, increased pain or change in bowel pattern. Patient and wife verbalized understanding.  Dr. Cortez made aware.     Suzanne Mckenzie RN on 7/26/2024 at 2:38 PM

## 2024-07-26 NOTE — TELEPHONE ENCOUNTER
Returned Rosio's call:    Having pain on right side near groin area, usually it's a dull pain that relieves with rest but has been increasing to the point where he finds it difficult to get up and walk around over the last 3-4 days. Shooting pains from belly button to groin on the right side.     Has an appointment with Dr. Cortez on Monday morning for evaluation. Patient states that pain is still relieved with rest, does not report any bowel issues.     Message sent to Dr. Cortez to update. Advised patient to be seen in ED over the weekend if pain became excruciating or did not relieve with rest but to otherwise plan on seeing Dr. Cortez Monday morning. Patient acknowledged, no further questions or concerns at this time.

## 2024-07-26 NOTE — NURSING NOTE
Current patient location:  [place]    Is the patient currently in the state of [place]? YES    Visit mode:VIDEO    If the visit is dropped, the patient can be reconnected by: VIDEO VISIT: Text to cell phone:   Telephone Information:   Mobile [phone]   Mobile [phone]       Will anyone else be joining the visit? NO  (If patient encounters technical issues they should call [phone]    How would you like to obtain your AVS? MyChart    Are changes needed to the allergy or medication list? No    Are refills needed on medications prescribed by this physician? NO    Reason for visit: Video Visit and RECHECK    Alena CHRISTY

## 2024-07-26 NOTE — PROGRESS NOTES
Virtual Visit Details    Type of service:  Video Visit     Originating Location (pt. Location): Home    Distant Location (provider location):  Off-site  Platform used for Video Visit: Yenifer    Diabetes Self-Management Education & Support    Mary Luisrowan Lopez presents today for education related to Type 2 diabetes    Patient is being treated with:  insulin  He is accompanied by spouse    Year of diagnosis: 2023  Referring provider:  Lai  Patient is followed by Suzanne THOMPSON, now followed by Geovanna THOMPSON  Living Situation: with spouse   Employment: n/a    PATIENT CONCERNS RELATED TO DIABETES SELF MANAGEMENT:       ASSESSMENT:    Taking Medication:     Current Diabetes Management per Patient:  Taking diabetes medications? yes:     Diabetes Medication(s)       Diabetic Other       Glucagon (GVOKE HYPOPEN) 1 MG/0.2ML pen Inject the contents of 1 device under the skin into lower abdomen, outer thigh, or outer upper arm as needed for hypoglycemia. If no response after 15 minutes, additional 1 mg dose from a new device may be injected while waiting for emergency assistance.       Insulin       insulin degludec (TRESIBA FLEXTOUCH) 100 UNIT/ML pen Inject 26 Units Subcutaneous every morning     Patient taking differently: Inject 13 Units subcutaneously every morning     Insulin Lispro-aabc, 1 U Dial, (LYUMJEV KWIKPEN) 100 UNIT/ML SOPN Inject 6 Units Subcutaneous 3 times daily (with meals) 6 units with meals, plus correction. Pt may use up to 30 units daily. This REPLACES Humalog/Novolog insulin.       Sodium-Glucose Co-Transporter 2 (SGLT2) Inhibitors       empagliflozin (JARDIANCE) 25 MG TABS tablet Take 1 tablet (25 mg) by mouth daily            Monitoring                  Patient's most recent   Lab Results   Component Value Date    A1C 8.1 10/31/2023        Healthy Eating:   encouraged consistent carb diet    Problem Solving:      Patient is at risk of hypoglycemia?: YES  Hospitalizations for hyper or  hypoglycemia: No      EDUCATION and INSTRUCTION PROVIDED AT THIS VISIT:      Mary's glucose control has worsened since last visit. He was recently on vacation and did not have a dexcom sensor but states was doing finger sticks and taking insulin.   Challenges continue to include missed boluses and delayed boluses. Mary does do his own injections sometimes but Adina will prepare them. It would be helpful if Mary could do the entire injection prep at the times when Adina is not available. Though they do spend most of their time together. I also expect that the Cequr patch will help Mary get his insulin injections before eating and with all meals. It is unclear what the hold up is in getting the Cequr. Last I spoke with pharmacy a prior auth had been sent to clinic. I will follow up with prior auth team to see where that is process.   Patient clearly needs higher insulin dosing. See plan for what was discussed with patient and Adina today.     Mary is currently taking base dose 4 units mealtime insulin plus 1/50/140/200  Tresiba 13 units    SEE AVS FOR PLAN    Patient-stated goal written and given to Mary Luis Lopez.  Verbalized and demonstrated understanding of instructions.       FOLLOW-UP:    One week  Joined the call at 7/26/2024, 9:42:20 am.  Left the call at 7/26/2024, 10:32:47 am.  You were on the call for 50 minutes 26 second  Time spent was 50 minutes    Any diabetes medication dose changes were made via the CDE Protocol and Collaborative Practice Agreement with Zaid and  Vishal.  A copy of this encounter was provided to patient's referring provider.

## 2024-07-26 NOTE — TELEPHONE ENCOUNTER
Patient Call: General  Route to LPN    Reason for call: wife calling on behalf of patient having hernia symptoms that have been going on for awhile. More details with call back.     Call back needed? Yes    Return Call Needed  Same as documented in contacts section  When to return call?: Same day: Route High Priority

## 2024-07-28 NOTE — PATIENT INSTRUCTIONS
1) I will call the pharmacy and check with our team on the prior auth for Cequr and will follow up with you on what I find out.    2) Do your best to take insulin 10 minutes before eating to give it a chance to start working before you eat.    3) Mary will start doing some his own injections with the supervision of Adina.    4) Try to very much limit any sweet drinks. Opt for diet or sugar free beverages.    5) Increase Tresiba to 16 units tomorrow. Increase by 10% every couple days until fasting glucose is <150.  Next Doses:  19 units  23 units  We will assess readings again in one week during follow up    6) Increase mealtime insulin dose to 6 units    7) Take 2 units mealtime insulin with a carb containing snack.     Lynne Narayanan for note off work. She is already having problematic joint issues. Will be worse after methotrexate stopped. Doubt she can work much with current left hip issue, needing replacement. After hip replacement, I would suspect she will be off up to 6 weeks or so after surgery for rehab. Ortho. Would be better to estimate this.

## 2024-07-29 ENCOUNTER — OFFICE VISIT (OUTPATIENT)
Dept: TRANSPLANT | Facility: CLINIC | Age: 54
End: 2024-07-29
Attending: TRANSPLANT SURGERY
Payer: COMMERCIAL

## 2024-07-29 ENCOUNTER — LAB (OUTPATIENT)
Dept: LAB | Facility: CLINIC | Age: 54
End: 2024-07-29
Payer: COMMERCIAL

## 2024-07-29 ENCOUNTER — TELEPHONE (OUTPATIENT)
Dept: TRANSPLANT | Facility: CLINIC | Age: 54
End: 2024-07-29

## 2024-07-29 VITALS
SYSTOLIC BLOOD PRESSURE: 129 MMHG | WEIGHT: 188.3 LBS | HEART RATE: 87 BPM | DIASTOLIC BLOOD PRESSURE: 84 MMHG | BODY MASS INDEX: 28.21 KG/M2 | RESPIRATION RATE: 16 BRPM

## 2024-07-29 DIAGNOSIS — K43.9 HERNIA OF ABDOMINAL WALL: ICD-10-CM

## 2024-07-29 DIAGNOSIS — Z94.4 LIVER REPLACED BY TRANSPLANT (H): Chronic | ICD-10-CM

## 2024-07-29 DIAGNOSIS — R79.89 ABNORMAL LIVER FUNCTION TESTS: Primary | ICD-10-CM

## 2024-07-29 LAB
ALBUMIN SERPL BCG-MCNC: 3.8 G/DL (ref 3.5–5.2)
ALP SERPL-CCNC: 239 U/L (ref 40–150)
ALT SERPL W P-5'-P-CCNC: 106 U/L (ref 0–70)
ANION GAP SERPL CALCULATED.3IONS-SCNC: 8 MMOL/L (ref 7–15)
AST SERPL W P-5'-P-CCNC: 55 U/L (ref 0–45)
BILIRUB DIRECT SERPL-MCNC: 0.34 MG/DL (ref 0–0.3)
BILIRUB SERPL-MCNC: 0.9 MG/DL
BUN SERPL-MCNC: 34.2 MG/DL (ref 6–20)
CALCIUM SERPL-MCNC: 9.4 MG/DL (ref 8.8–10.4)
CHLORIDE SERPL-SCNC: 107 MMOL/L (ref 98–107)
CREAT SERPL-MCNC: 1.46 MG/DL (ref 0.67–1.17)
CYCLOSPORINE BLD LC/MS/MS-MCNC: 214 UG/L (ref 50–400)
EGFRCR SERPLBLD CKD-EPI 2021: 58 ML/MIN/1.73M2
ERYTHROCYTE [DISTWIDTH] IN BLOOD BY AUTOMATED COUNT: 14 % (ref 10–15)
GLUCOSE SERPL-MCNC: 135 MG/DL (ref 70–99)
HCO3 SERPL-SCNC: 26 MMOL/L (ref 22–29)
HCT VFR BLD AUTO: 33.2 % (ref 40–53)
HGB BLD-MCNC: 10.8 G/DL (ref 13.3–17.7)
MAGNESIUM SERPL-MCNC: 2 MG/DL (ref 1.7–2.3)
MCH RBC QN AUTO: 30.3 PG (ref 26.5–33)
MCHC RBC AUTO-ENTMCNC: 32.5 G/DL (ref 31.5–36.5)
MCV RBC AUTO: 93 FL (ref 78–100)
PHOSPHATE SERPL-MCNC: 4.8 MG/DL (ref 2.5–4.5)
PLATELET # BLD AUTO: 213 10E3/UL (ref 150–450)
POTASSIUM SERPL-SCNC: 5.4 MMOL/L (ref 3.4–5.3)
PROT SERPL-MCNC: 6.5 G/DL (ref 6.4–8.3)
RBC # BLD AUTO: 3.57 10E6/UL (ref 4.4–5.9)
SODIUM SERPL-SCNC: 141 MMOL/L (ref 135–145)
TME LAST DOSE: NORMAL H
TME LAST DOSE: NORMAL H
WBC # BLD AUTO: 8.7 10E3/UL (ref 4–11)

## 2024-07-29 PROCEDURE — 99214 OFFICE O/P EST MOD 30 MIN: CPT | Performed by: TRANSPLANT SURGERY

## 2024-07-29 PROCEDURE — 85027 COMPLETE CBC AUTOMATED: CPT

## 2024-07-29 PROCEDURE — 36415 COLL VENOUS BLD VENIPUNCTURE: CPT

## 2024-07-29 PROCEDURE — 80158 DRUG ASSAY CYCLOSPORINE: CPT

## 2024-07-29 PROCEDURE — 84100 ASSAY OF PHOSPHORUS: CPT

## 2024-07-29 PROCEDURE — 80053 COMPREHEN METABOLIC PANEL: CPT

## 2024-07-29 PROCEDURE — 83735 ASSAY OF MAGNESIUM: CPT

## 2024-07-29 PROCEDURE — 99213 OFFICE O/P EST LOW 20 MIN: CPT | Performed by: TRANSPLANT SURGERY

## 2024-07-29 PROCEDURE — 82248 BILIRUBIN DIRECT: CPT

## 2024-07-29 NOTE — TELEPHONE ENCOUNTER
Liver tests abnormal. Spoke to Adina, Mary hasn't missed any meds, feels fine.  We know that he still has his biliary stent.  The stent was not reachable via upper endoscopy so he is scheduled for ERCP for removal on 8/13.  Message to Dr. Muniz asking if we  should move this up.

## 2024-07-29 NOTE — TELEPHONE ENCOUNTER
Mary was done w/ mmf on Sunday, July 28.  CSA level 214.  Current goal 150-200.  Will leave CSA dose as is for now.  Dr. Muniz would like hepatic panel rechecked on Wednesday 8/31.  Adina taylor.

## 2024-07-29 NOTE — PROGRESS NOTES
Transplant Surgery -OUTPATIENT IMMUNOSUPPRESSION PROGRESS NOTE    Date of Visit: 07/29/2024    Transplants:  3/5/2024 (Liver); Postoperative day:  146  ASSESMENT AND PLAN:  1.Graft Function:Liver allograft: no rejection or technical problems.    2.Immunosuppression Management:keep cyclosporine levels at 200 ng/dL  3.Hypertension:ok  4.Renal Function:good  5.Lab frequency:twice weekly  6.Other:  Right inguinal hernia: I explained the risk benefits and alternatives of mesh repair of right inguinal hernia    Date: July 29, 2024    Transplant:  [x]                             Liver [x]                              Kidney []                             Pancreas []                              Other:             Chief Complaint:  Pain in the right groin    History of Present Illness:  Patient Active Problem List   Diagnosis    Primary hypertension    Mild hyperlipidemia    Persistent insomnia    Alcohol use disorder, severe, in early remission (H)    Type 2 diabetes mellitus without complication, with long-term current use of insulin (H)    Liver replaced by transplant (H)    Immunosuppressed status (H24)    Hypomagnesemia    Ganglion cyst of wrist, right    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)     SOCIAL /FAMILY HISTORY: [x]                  No recent change    Past Medical History:   Diagnosis Date    ANGEL (acute kidney injury) (H24)     Alcoholic cirrhosis of liver without ascites (H) 07/13/2023    Alcoholic hepatitis with ascites (H28) 10/03/2023    Alcoholic hepatitis without ascites (H28) 07/13/2023    Closed fracture of one rib of left side 09/14/2023    Concussion without loss of consciousness 03/11/2020    Decompensated hepatic cirrhosis (H) 09/15/2023    Diabetes mellitus, type 2 (H) 11/22/2023    Essential hypertension 03/11/2020    Ganglion cyst of wrist, right 04/18/2024    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)     Liver replaced by transplant (H) 03/05/2024    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    ACR PRAJAPATI 6-7/9, treated with steroids    Mild hyperlipidemia 2021    Persistent insomnia 2023    Portal hypertension (H) 2023    Scrotal abscess     Secondary esophageal 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 (H) 2023    Tobacco abuse disorder 2020    Type 2 diabetes mellitus with hyperglycemia (H) 2023     Past Surgical History:   Procedure Laterality Date    BENCH LIVER  3/5/2024    Procedure: Bench liver;  Surgeon: Ryder Cortez MD;  [facility]: [facility]    CHOLECYSTECTOMY      COLONOSCOPY N/A 2024    Procedure: COLONOSCOPY, WITH POLYPECTOMY;  Surgeon: Jak Urbina MD;  Location: [facility]    CV RIGHT HEART CATH MEASUREMENTS RECORDED N/A 2024    Procedure: Right Heart Catheterization;  Surgeon: Alfred Tafoya MD;  [facility]    ENTEROSCOPY SMALL BOWEL N/A 2024    Procedure: Enteroscopy small bowel;  Surgeon: Hugo Daigle MD;  Location: [facility]    ESOPHAGOSCOPY, GASTROSCOPY, DUODENOSCOPY (EGD), COMBINED N/A 2024    Procedure: Esophagoscopy, gastroscopy, duodenoscopy (EGD), combined;  Surgeon: Hugo Daigle MD;  Location: [facility]    IR LIVER BIOPSY PERCUTANEOUS  3/15/2024    IR RETROPERITONEAL ABSCESS DRAINAGE  2024    TONSILLECTOMY      TRANSPLANT LIVER RECIPIENT  DONOR N/A 3/5/2024    Procedure: Transplant liver recipient  donor, bile duct stent placement;  Surgeon: Ryder Cortez MD;  Location: [facility]    VASECTOMY       Social History     Socioeconomic History    Marital status:      Spouse name: Not on file    Number of children: Not on file    Years of education: Not on file    Highest education level: Not on file   Occupational History    Occupation: Not working now   Tobacco Use    Smoking status: Former     Types: Cigarettes     Passive exposure: Never    Smokeless tobacco: Current     Types: Chew     Last attempt to quit: 2004    Tobacco  comments:     Chew daily 1/8 of a tin per day   Vaping Use    Vaping status: Never Used   Substance and Sexual Activity    Alcohol use: Not Currently     Alcohol/week: 12.0 standard drinks of alcohol     Types: 12 Standard drinks or equivalent per week     Comment: Sober since 6/25/2023    Drug use: Not Currently    Sexual activity: Yes     Partners: Female     Birth control/protection: Male Surgical   Other Topics Concern    Parent/sibling w/ CABG, MI or angioplasty before 65F 55M? No   Social History Narrative    Not on file     Social Determinants of Health     Financial Resource Strain: Low Risk  (12/22/2023)    Financial Resource Strain     Within the past 12 months, have you or your family members you live with been unable to get utilities (heat, electricity) when it was really needed?: No   Food Insecurity: No Food Insecurity (6/22/2024)    Received from [facility]    Hunger Vital Sign     Worried About Running Out of Food in the Last Year: Never true     Ran Out of Food in the Last Year: Never true   Transportation Needs: No Transportation Needs (6/22/2024)    Received from [facility]    PRAPARE - Transportation     Lack of Transportation (Medical): No     Lack of Transportation (Non-Medical): No   Physical Activity: Not on file   Stress: Not on file   Social Connections: Not on file   Interpersonal Safety: Low Risk  (12/22/2023)    Interpersonal Safety     Do you feel physically and emotionally safe where you currently live?: Yes     Within the past 12 months, have you been hit, slapped, kicked or otherwise physically hurt by someone?: No     Within the past 12 months, have you been humiliated or emotionally abused in other ways by your partner or ex-partner?: No   Housing Stability: Low Risk  (6/22/2024)    Received from [facility]    Housing Stability Vital Sign     Unable to Pay for Housing in the Last Year: No     Number of Times Moved in the Last Year: 0     Homeless in the  Last Year: No     Prescription Medications as of 2024         Rx Number Disp Refills Start End Last Dispensed Date Next Fill Date Owning Pharmacy    acetaminophen (TYLENOL) 325 MG tablet  -- -- 2024 --       Sig: Take 3 tablets (975 mg) by mouth every 8 hours as needed for mild pain Try first before Tramadol.    Class: No Print Out    Route: Oral    aspirin (ASA) 325 MG tablet  60 tablet 1 2024   [id number] - [place] [place] [facility] [address] [facility]    Si tablet (325 mg) by Oral or Feeding Tube route daily for 65 days    Class: E-Prescribe    Route: Oral or Feeding Tube    blood glucose (NO BRAND SPECIFIED) test strip  100 strip 6 10/24/2023 --   [id number] - [place], [place] - [address] [facility] [facility]    Sig: Use to test blood sugar two times daily or as directed. To accompany: Blood Glucose Monitor Brands: per insurance.    Class: E-Prescribe    blood glucose monitoring (NO BRAND SPECIFIED) meter device kit  1 kit 0 10/24/2023 --   [facility] #[id number] - [place], [place] - [address]    Sig: Use to test blood sugar twice times daily or as directed. Preferred blood glucose meter OR supplies to accompany: Blood Glucose Monitor Brands: per insurance.    Class: E-Prescribe    Continuous Blood Gluc Sensor (DEXCOM G7 SENSOR) MISC  3 each 5 3/21/2024 --   [place], [place] - [address] [place] [address]    Sig: Change every 10 days.    Class: E-Prescribe    Continuous Blood Gluc Sensor (DEXCOM G7 SENSOR) MISC  3 each 5 2023 --   [facility] #[id number] - [place], [place] - [address]    Sig: Change every 10 days.    Class: E-Prescribe    cycloSPORINE modified (GENERIC EQUIVALENT) 100 MG capsule  120 capsule 11 2024 --   [facility] #[id number] - [place], [place] - [address]    Sig: Take 2 capsules  (200 mg) by mouth every 12 hours Total dose: 275 mg BID    Class: E-Prescribe    Route: Oral    cycloSPORINE modified (GENERIC EQUIVALENT) 25 MG capsule  180 capsule 3 2024 --   [id number] - [place], [address]    Sig: Take 3 capsules (75 mg) by mouth every 12 hours Total dose: 275mg BID    Class: E-Prescribe    Route: Oral    dapsone (ACZONE) 25 MG tablet  132 tablet 0 2024   [place] #[id number] - [place]    Sig: Take 2 tablets (50 mg) by mouth daily for 65 days Medication complete when pills run out.    Class: E-Prescribe    Route: Oral    empagliflozin (JARDIANCE) 25 MG TABS tablet  90 tablet 1 2024 --   [id number] - [email or web address]    Sig: Take 1 tablet (25 mg) by mouth daily    Class: E-Prescribe    Route: Oral    fludrocortisone (FLORINEF) 0.1 MG tablet  30 tablet 0 2024 --   [facility] [address]    Sig: Take 1 tablet (0.1 mg) by mouth three times a week    Class: E-Prescribe    Route: Oral    Renewals       Renewal requests to authorizing provider (Fabi Aguirre, NP) <b>prohibited</b>            Glucagon (GVOKE HYPOPEN) 1 MG/0.2ML pen  0.4 mL 1 3/21/2024 --   [address]    Sig: Inject the contents of 1 device under the skin into lower abdomen, outer thigh, or outer upper arm as needed for hypoglycemia. If no response after 15 minutes, additional 1 mg dose from a new device may be injected while waiting for emergency assistance.    Class: E-Prescribe    Notes to Pharmacy: Replaces BAQSIMI per insurance formulary    Injection Device for insulin (CEQUR SIMPLICITY 2U) [id number]  10 each 5 2024 --   [id number] - [place], [address]    Si each  every 3 days    Class: E-Prescribe    Route: Does not apply    insulin degludec (TRESIBA FLEXTOUCH) 100 UNIT/ML pen  15 mL 5 2024 --   [facility] #[id number] - [place], [place] [address] [facility]    Sig: Inject 26 Units Subcutaneous every morning    Class: E-Prescribe    Route: Subcutaneous    Insulin Disposable Pump (OMNIPOD 5 G6 PODS, GEN 5,) MISC  10 each 0 2024 --   [facility] #[id number] - [place], [place] [address] [facility]    Si each every 3 days Change every 3 days.    Class: E-Prescribe    Route: Does not apply    Insulin Lispro-aabc, 1 U Dial, (MCKAY AREVALOPEN) 100 UNIT/ML SOPN  30 mL 2 2024 --   [facility] #[id number] - [place], [place] - [address] [facility]    Sig: Inject 6 Units Subcutaneous 3 times daily (with meals) 6 units with meals, plus correction. Pt may use up to 30 units daily. This REPLACES Humalog/Novolog insulin.    Class: E-Prescribe    Route: Subcutaneous    insulin pen needle (29G X 12.7MM) 29G X 12.7MM miscellaneous  90 each 1 2024 --   [facility] #[id number] - [place], [place] - [address] [facility]    Sig: Use 1 pen needle every three days or as directed.    Class: E-Prescribe    insulin pen needle (32G X 4 MM) 32G X 4 MM miscellaneous  200 each 1 3/21/2024 --   [facility] - [place], [place] - [address]    Sig: Use as directed by provider Per insurance coverage    Class: E-Prescribe    insulin pen needle (BD PEN NEEDLE SHERRIE 2ND GEN) 32G X 4 MM miscellaneous  100 each 11 2024 --   [id number] - [place], [place] - [address] [facility]    Sig: USES 5 PER DAY    Class: E-Prescribe    Magnesium Bisglycinate (MAG GLYCINATE) 100 MG TABS  360 tablet 3 2024 --   [facility] #[id number] - [place], [place] - [address] [facility]    Sig: Take 2 tablets (200 mg) by mouth 2 times  daily    Class: E-Prescribe    Route: Oral    melatonin 5 MG tablet  30 tablet 2 6/27/2024 --   [facility] - [address]    Sig: Take 1 tablet (5 mg) by mouth daily (with dinner) Take daily at dusk.    Class: E-Prescribe    Route: Oral    Renewals       Renewal requests to authorizing provider (Fabi Aguirre NP) <b>prohibited</b>            multivitamin w/minerals (THERA-VIT-M) tablet  90 tablet 1 2/2/2024 --   [id number] [address] [facility]    Sig: TAKE 1 TABLET BY MOUTH DAILY    Class: E-Prescribe    Route: Oral    Renewals       Renewal provider: Jimmie Hoyt MD            mycophenolic acid (GENERIC EQUIVALENT) 180 MG EC tablet  540 tablet 0 7/2/2024 --   [id number] [address] [facility]    Sig: Take 3 tablets (540 mg) by mouth 2 times daily On 7/8 start 360mg BID, on 7/15 start 180mg BID, on 7/22 start 180mg daily    Class: E-Prescribe    Notes to Pharmacy: TXP DT 3/5/2024 (Liver) TXP Dischg DT 3/21/2024 DX Liver replaced by transplant Z94.4 [facility] ([place], [place])    Route: Oral    omeprazole (PRILOSEC) 20 MG DR capsule  180 capsule 1 8/3/2023 --       Sig: Take 20 mg by mouth daily    Class: Historical    Route: Oral    QUEtiapine (SEROQUEL) 25 MG tablet  -- -- 6/27/2024 --       Sig: Take 1-2 tablets (25-50 mg) by mouth nightly as needed (Sleep)    Class: No Print Out    Route: Oral    sodium zirconium cyclosilicate (LOKELMA) 10 g PACK packet  30 each 0 6/27/2024 --   [facility] - [place], [place] - [address] [place] [address]    Sig: Take 1 packet (10 g) by mouth daily as needed (Hyperkalemia)    Class: E-Prescribe    Route: Oral    Renewals       Renewal requests to authorizing provider (Fabi gAuirre NP) <b>prohibited</b>            thin (NO BRAND SPECIFIED) lancets   100 each 6 10/24/2023 --   [facility] #[id number] - [place], [place] - [address] [facility]    Sig: Use with lanceting device. To accompany: Blood Glucose Monitor Brands: per insurance.    Class: E-Prescribe    ursodiol (ACTIGALL) 300 MG capsule  180 capsule 3 6/13/2024 --   [facility] #[id number] - [place], [place] - [address] [facility]    Sig: Take 1 capsule (300 mg) by mouth 2 times daily    Class: E-Prescribe    Route: Oral          Other food allergy and Pineapple   REVIEW OF SYSTEMS (check box if normal)  [x]               GENERAL  [x]                 PULMONARY [x]                GENITOURINARY  [x]                CNS                 [x]                 CARDIAC  [x]                 ENDOCRINE  [x]                EARS,NOSE,THROAT [x]                 GASTROINTESTINAL [x]                 NEUROLOGIC    [x]                MUSCLOSKELTAL  [x]                  HEMATOLOGY      PHYSICAL EXAM (check box if normal)/84 (BP Location: Right arm, Patient Position: Sitting, Cuff Size: Adult Large)   Pulse 87   Resp 16   Wt 85.4 kg (188 lb 4.8 oz)   BMI 28.21 kg/m          [x]            GENERAL:    [x]       EYES:  ICTERIC   []        YES  []                    NO  [x]           EXTREMITIES: Clubbing []       Y     [x]           N    [x]           EARS, NOSE, THROAT: Membranes Moist    YES   [x]                   NO []                  [x]           LUNGS:  CLEAR    YES       [x]                  NO    []                                [x]           SKIN: Jaundice           YES       []                  NO    [x]                   Rash: YES       []                  NO    [x]                                     [x]             HEART: Regular Rate          YES       [x]                  NO    []                   Incision Clean:  YES       [x]                  NO    []                                [x]                    ABDOMEN: Right indirect inguinal hernia present  Organomegaly YES       []                  NO    [x]                       [x]                    NEUROLOGICAL:  Nonfocal  YES       [x]                  NO    []                       [x]                    Hernia YES       []                  NO    [x]                   PSYCHIATRIC:  Appropriate  YES       [x]                  NO    []                       OTHER:                                                                                                   PAIN SCALE:: 3

## 2024-07-29 NOTE — TELEPHONE ENCOUNTER
Saw Diego today for consultation re; right inquinal hernia repair.  Message to Shanika Israel about scheduling.   ERCP for biliary stent removal scheduled for 8/13.

## 2024-07-29 NOTE — NURSING NOTE
Chief Complaint   Patient presents with    Follow Up       /84 (BP Location: Right arm, Patient Position: Sitting, Cuff Size: Adult Large)   Pulse 87   Resp 16   Wt 85.4 kg (188 lb 4.8 oz)   BMI 28.21 kg/m      CHUCK ORTEGA RN on 7/29/2024 at 10:02 AM

## 2024-07-29 NOTE — LETTER
7/29/2024      Mary Anayarowan Lopez  [address]  [place] [address]      Dear Colleague,    Thank you for referring your patient, Mary Lopez, to the [facility]ANSPL[facility]. Please see a copy of my visit note below.    Transplant Surgery -OUTPATIENT IMMUNOSUPPRESSION PROGRESS NOTE    Date of Visit: 07/29/2024    Transplants:  3/5/2024 (Liver); Postoperative day:  146  ASSESMENT AND PLAN:  1.Graft Function:Liver allograft: no rejection or technical problems.    2.Immunosuppression Management:keep cyclosporine levels at 200 ng/dL  3.Hypertension:ok  4.Renal Function:good  5.Lab frequency:twice weekly  6.Other:  Right inguinal hernia: I explained the risk benefits and alternatives of mesh repair of right inguinal hernia    Date: July 29, 2024    Transplant:  [x]                             Liver [x]                              Kidney []                             Pancreas []                              Other:             Chief Complaint:  Pain in the right groin    History of Present Illness:  Patient Active Problem List   Diagnosis     Primary hypertension     Mild hyperlipidemia     Persistent insomnia     Alcohol use disorder, severe, in early remission (H)     Type 2 diabetes mellitus without complication, with long-term current use of insulin (H)     Liver replaced by transplant (H)     Immunosuppressed status (H24)     Hypomagnesemia     Ganglion cyst of wrist, right     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)     SOCIAL /FAMILY HISTORY: [x]                  No recent change    Past Medical History:   Diagnosis Date     ANGEL (acute kidney injury) (H24)      Alcoholic cirrhosis of liver without ascites (H) 07/13/2023     Alcoholic hepatitis with ascites (H28) 10/03/2023     Alcoholic hepatitis without ascites (H28) 07/13/2023     Closed fracture of one rib of left side 09/14/2023     Concussion without loss of consciousness 03/11/2020     Decompensated hepatic  cirrhosis (H) 09/15/2023     Diabetes mellitus, type 2 (H) 2023     Essential hypertension 2020     Ganglion cyst of wrist, right 2024     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)      Liver replaced by transplant (H) 2024     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    ACR PRAJAPATI 6-7/9, treated with steroids     Mild hyperlipidemia 2021     Persistent insomnia 2023     Portal hypertension (H) 2023     Scrotal abscess      Secondary esophageal 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 (H) 2023     Tobacco abuse disorder 2020     Type 2 diabetes mellitus with hyperglycemia (H) 2023     Past Surgical History:   Procedure Laterality Date     BENCH LIVER  3/5/2024    Procedure: Bench liver;  Surgeon: Ryder Cortez MD;  Location: [facility]     CHOLECYSTECTOMY       COLONOSCOPY N/A 2024    Procedure: COLONOSCOPY, WITH POLYPECTOMY;  Surgeon: Jak Urbina MD;  Location: [facility]     CV RIGHT HEART CATH MEASUREMENTS RECORDED N/A 2024    Procedure: Right Heart Catheterization;  Surgeon: Alfred Tafoya MD;  Location:  HEART CARDIAC CATH LAB     ENTEROSCOPY SMALL BOWEL N/A 2024    Procedure: Enteroscopy small bowel;  Surgeon: Hugo Daigle MD;  Location: [facility]     ESOPHAGOSCOPY, GASTROSCOPY, DUODENOSCOPY (EGD), COMBINED N/A 2024    Procedure: Esophagoscopy, gastroscopy, duodenoscopy (EGD), combined;  Surgeon: Hugo Daigle MD;  Location: [facility]     IR LIVER BIOPSY PERCUTANEOUS  3/15/2024     IR RETROPERITONEAL ABSCESS DRAINAGE  2024     TONSILLECTOMY       TRANSPLANT LIVER RECIPIENT  DONOR N/A 3/5/2024    Procedure: Transplant liver recipient  donor, bile duct stent placement;  Surgeon: Ryder Cortez MD;  Location: [facility] OR     VASECTOMY       Social History     Socioeconomic History     Marital status:      Spouse name: Not on file     Number of children: Not on file     Years of education:  Not on file     Highest education level: Not on file   Occupational History     Occupation: Not working now   Tobacco Use     Smoking status: Former     Types: Cigarettes     Passive exposure: Never     Smokeless tobacco: Current     Types: Chew     Last attempt to quit: 1/1/2004     Tobacco comments:     Chew daily 1/8 of a tin per day   Vaping Use     Vaping status: Never Used   Substance and Sexual Activity     Alcohol use: Not Currently     Alcohol/week: 12.0 standard drinks of alcohol     Types: 12 Standard drinks or equivalent per week     Comment: Sober since 6/25/2023     Drug use: Not Currently     Sexual activity: Yes     Partners: Female     Birth control/protection: Male Surgical   Other Topics Concern     Parent/sibling w/ CABG, MI or angioplasty before 65F 55M? No   Social History Narrative     Not on file     Social Determinants of Health     Financial Resource Strain: Low Risk  (12/22/2023)    Financial Resource Strain      Within the past 12 months, have you or your family members you live with been unable to get utilities (heat, electricity) when it was really needed?: No   Food Insecurity: No Food Insecurity (6/22/2024)    Received from [facility]    Hunger Vital Sign      Worried About Running Out of Food in the Last Year: Never true      Ran Out of Food in the Last Year: Never true   Transportation Needs: No Transportation Needs (6/22/2024)    Received from [facility]    PRAPARE - Transportation      Lack of Transportation (Medical): No      Lack of Transportation (Non-Medical): No   Physical Activity: Not on file   Stress: Not on file   Social Connections: Not on file   Interpersonal Safety: Low Risk  (12/22/2023)    Interpersonal Safety      Do you feel physically and emotionally safe where you currently live?: Yes      Within the past 12 months, have you been hit, slapped, kicked or otherwise physically hurt by someone?: No      Within the past 12 months, have you been  humiliated or emotionally abused in other ways by your partner or ex-partner?: No   Housing Stability: Low Risk  (2024)    Received from [facility]    Housing Stability Vital Sign      Unable to Pay for Housing in the Last Year: No      Number of Times Moved in the Last Year: 0      Homeless in the Last Year: No     Prescription Medications as of 2024         Rx Number Disp Refills Start End Last Dispensed Date Next Fill Date Owning Pharmacy    acetaminophen (TYLENOL) 325 MG tablet  -- -- 2024 --       Sig: Take 3 tablets (975 mg) by mouth every 8 hours as needed for mild pain Try first before Tramadol.    Class: No Print Out    Route: Oral    aspirin (ASA) 325 MG tablet  60 tablet 1 2024   [facility] #[id number] - [place], [place] - [address]  AT [facility]    Si tablet (325 mg) by Oral or Feeding Tube route daily for 65 days    Class: E-Prescribe    Route: Oral or Feeding Tube    blood glucose (NO BRAND SPECIFIED) test strip  100 strip 6 10/24/2023 --   [facility] #[id number] - [place], [place] - [address]  AT [facility]    Sig: Use to test blood sugar two times daily or as directed. To accompany: Blood Glucose Monitor Brands: per insurance.    Class: E-Prescribe    blood glucose monitoring (NO BRAND SPECIFIED) meter device kit  1 kit 0 10/24/2023 --   [facility] #[id number] - [place], [place] - [address]  AT [facility]    Sig: Use to test blood sugar twice times daily or as directed. Preferred blood glucose meter OR supplies to accompany: Blood Glucose Monitor Brands: per insurance.    Class: E-Prescribe    Continuous Blood Gluc Sensor (DEXCOM G7 SENSOR) MISC  3 each 5 3/21/2024 --   [place], [place] - [address]    Sig: Change every 10 days.    Class: E-Prescribe    Continuous Blood Gluc Sensor (DEXCOM G7 SENSOR) MISC  3 each 5 2023 --   [facility] #[id number] -  [place]    Sig: Change every 10 days.    Class: E-Prescribe    cycloSPORINE modified (GENERIC EQUIVALENT) 100 MG capsule  120 capsule 11 7/23/2024 --   [facility] #[id number] - [place]    Sig: Take 2 capsules (200 mg) by mouth every 12 hours Total dose: 275 mg BID    Class: E-Prescribe    Route: Oral    cycloSPORINE modified (GENERIC EQUIVALENT) 25 MG capsule  180 capsule 3 7/23/2024 --   [facility] #[id number] - [address]    Sig: Take 3 capsules (75 mg) by mouth every 12 hours Total dose: 275mg BID    Class: E-Prescribe    Route: Oral    dapsone (ACZONE) 25 MG tablet  132 tablet 0 7/2/2024 9/5/2024   [facility] #[id number] - [address]    Sig: Take 2 tablets (50 mg) by mouth daily for 65 days Medication complete when pills run out.    Class: E-Prescribe    Route: Oral    empagliflozin (JARDIANCE) 25 MG TABS tablet  90 tablet 1 6/6/2024 --   [facility] #[id number] - [place]    Sig: Take 1 tablet (25 mg) by mouth daily    Class: E-Prescribe    Route: Oral    fludrocortisone (FLORINEF) 0.1 MG tablet  30 tablet 0 6/28/2024 --   [address]    Sig: Take 1 tablet (0.1 mg) by mouth three times a week    Class: E-Prescribe    Route: Oral    Renewals       Renewal requests to authorizing provider (Fabi Aguirre NP) <b>prohibited</b>            Glucagon (GVOKE HYPOPEN) 1 MG/0.2ML pen  0.4 mL 1 3/21/2024 --   [address]    Sig: Inject the contents of 1 device under the skin into lower abdomen, outer thigh, or outer upper arm as needed for hypoglycemia. If no response after 15 minutes, additional 1 mg dose from a new device may be injected  while waiting for emergency assistance.    Class: E-Prescribe    Notes to Pharmacy: Replaces BAQSIMI per insurance formulary    Injection Device for insulin (CEQUR SIMPLICITY 2U) KHUSHBOO  10 each 5 2024 --   [facility] #[id number] - [place], [place] - [address] [facility] AT [facility]    Si each every 3 days    Class: E-Prescribe    Route: Does not apply    insulin degludec (TRESIBA FLEXTOUCH) 100 UNIT/ML pen  15 mL 5 2024 --   [facility] #[id number] - [place], [place] - [address] [facility] [address] [address] AT [facility]    Sig: Inject 26 Units Subcutaneous every morning    Class: E-Prescribe    Route: Subcutaneous    Insulin Disposable Pump (OMNIPOD 5 G6 PODS, GEN 5,) MISC  10 each 0 2024 --   [id number] - [place], [place] - [address] [facility]    Si each every 3 days Change every 3 days.    Class: E-Prescribe    Route: Does not apply    Insulin Lispro-aabc, 1 U Dial, (LYUMJEV KWIKPEN) 100 UNIT/ML SOPN  30 mL 2 2024 --   [facility] #[id number] - [place], [place] - [address] [facility]    Sig: Inject 6 Units Subcutaneous 3 times daily (with meals) 6 units with meals, plus correction. Pt may use up to 30 units daily. This REPLACES Humalog/Novolog insulin.    Class: E-Prescribe    Route: Subcutaneous    insulin pen needle (29G X 12.7MM) 29G X 12.7MM miscellaneous  90 each 1 2024 --   [facility] #[id number] - [place], [place] - [address] [facility]    Sig: Use 1 pen needle every three days or as directed.    Class: E-Prescribe    insulin pen needle (32G X 4 MM) 32G X 4 MM miscellaneous  200 each 1 3/21/2024 --   [facility] - [place], [place] - [address] [place] [address]    Sig: Use as directed by provider Per insurance coverage    Class: E-Prescribe    insulin pen needle (BD PEN NEEDLE SHERRIE 2ND GEN) 32G X 4 MM miscellaneous  100 each 11 2024 --   [facility] #[id number] - [place] -  [address] [facility] [address] [facility]    Sig: USES 5 PER DAY    Class: E-Prescribe    Magnesium Bisglycinate (MAG GLYCINATE) 100 MG TABS  360 tablet 3 7/23/2024 --   [facility] #[id number] - [place], [place] - [address] [facility] [facility]    Sig: Take 2 tablets (200 mg) by mouth 2 times daily    Class: E-Prescribe    Route: Oral    melatonin 5 MG tablet  30 tablet 2 6/27/2024 --   [facility] - [place], [place] - [address] [place] [address]    Sig: Take 1 tablet (5 mg) by mouth daily (with dinner) Take daily at dusk.    Class: E-Prescribe    Route: Oral    Renewals       Renewal requests to authorizing provider (Fabi Aguirre NP) <b>prohibited</b>            multivitamin w/minerals (THERA-VIT-M) tablet  90 tablet 1 2/2/2024 --   [facility] #[id number] - [place], [place] - [address] [facility] [facility]    Sig: TAKE 1 TABLET BY MOUTH DAILY    Class: E-Prescribe    Route: Oral    Renewals       Renewal provider: Jimmie Hoyt MD            mycophenolic acid (GENERIC EQUIVALENT) 180 MG EC tablet  540 tablet 0 7/2/2024 --   [id number] - [place], [place] - [address] [facility] [facility]    Sig: Take 3 tablets (540 mg) by mouth 2 times daily On 7/8 start 360mg BID, on 7/15 start 180mg BID, on 7/22 start 180mg daily    Class: E-Prescribe    Notes to Pharmacy: TXP DT 3/5/2024 (Liver) TXP Dischg DT 3/21/2024 DX Liver replaced by transplant Z94.4 [facility] ([place], [place])    Route: Oral    omeprazole (PRILOSEC) 20 MG DR capsule  180 capsule 1 8/3/2023 --       Sig: Take 20 mg by mouth daily    Class: Historical    Route: Oral    QUEtiapine (SEROQUEL) 25 MG tablet  -- -- 6/27/2024 --       Sig: Take 1-2 tablets (25-50 mg) by mouth nightly as needed (Sleep)    Class: No Print Out    Route: Oral    sodium zirconium cyclosilicate (LOKELMA) 10 g PACK packet  30 each 0 6/27/2024 --   [facility] - [place], [place] - [address] [facility]    Sig: Take 1 packet (10 g) by mouth daily as needed (Hyperkalemia)    Class: E-Prescribe    Route: Oral    Renewals       Renewal requests to authorizing provider (Fabi Aguirre NP) <b>prohibited</b>            thin (NO BRAND SPECIFIED) lancets  100 each 6 10/24/2023 --   [facility] #[id number] - [place], [place] - [address] [facility] [facility]    Sig: Use with lanceting device. To accompany: Blood Glucose Monitor Brands: per insurance.    Class: E-Prescribe    ursodiol (ACTIGALL) 300 MG capsule  180 capsule 3 6/13/2024 --   [facility] #[id number] - [place], [place] - [address] [facility] [facility]    Sig: Take 1 capsule (300 mg) by mouth 2 times daily    Class: E-Prescribe    Route: Oral          Other food allergy and Pineapple   REVIEW OF SYSTEMS (check box if normal)  [x]               GENERAL  [x]                 PULMONARY [x]                GENITOURINARY  [x]                CNS                 [x]                 CARDIAC  [x]                 ENDOCRINE  [x]                EARS,NOSE,THROAT [x]                 GASTROINTESTINAL [x]                 NEUROLOGIC    [x]                MUSCLOSKELTAL  [x]                  HEMATOLOGY      PHYSICAL EXAM (check box if normal)/84 (BP Location: Right arm, Patient Position: Sitting, Cuff Size: Adult Large)   Pulse 87   Resp 16   Wt 85.4 kg (188 lb 4.8 oz)   BMI 28.21 kg/m          [x]            GENERAL:    [x]       EYES:  ICTERIC   []        YES  []                    NO  [x]           EXTREMITIES: Clubbing []       Y     [x]           N    [x]           EARS, NOSE, THROAT: Membranes Moist    YES   [x]                   NO []                  [x]           LUNGS:  CLEAR    YES       [x]                  NO    []                                [x]           SKIN: Jaundice           YES       []                  NO    [x]                   Rash: YES       []                  NO     [x]                                     [x]             HEART: Regular Rate          YES       [x]                  NO    []                   Incision Clean:  YES       [x]                  NO    []                                [x]                    ABDOMEN: Right indirect inguinal hernia present Organomegaly YES       []                  NO    [x]                       [x]                    NEUROLOGICAL:  Nonfocal  YES       [x]                  NO    []                       [x]                    Hernia YES       []                  NO    [x]                   PSYCHIATRIC:  Appropriate  YES       [x]                  NO    []                       OTHER:                                                                                                   PAIN SCALE:: 3       Again, thank you for allowing me to participate in the care of your patient.        Sincerely,        Ryder Cortez MD

## 2024-07-30 NOTE — TELEPHONE ENCOUNTER
Prior Authorization Approval    Medication: CEQUR SIMPLICITY 2U KHUSHBOO  Authorization Effective Date: 7/30/2024  Authorization Expiration Date: 7/29/2025  Approved Dose/Quantity:   Reference #:     Insurance Company: [employer] - Phone [phone] Fax [phone]  Expected CoPay: $    CoPay Card Available:      Financial Assistance Needed:   Which Pharmacy is filling the prescription: [facility] #[id number] - [place], [place] - [address]  Pharmacy Notified: YES  Patient Notified: **Instructed pharmacy to notify patient when script is ready to /ship.**

## 2024-07-31 ENCOUNTER — TELEPHONE (OUTPATIENT)
Dept: TRANSPLANT | Facility: CLINIC | Age: 54
End: 2024-07-31

## 2024-07-31 ENCOUNTER — LAB (OUTPATIENT)
Dept: LAB | Facility: CLINIC | Age: 54
End: 2024-07-31
Payer: COMMERCIAL

## 2024-07-31 ENCOUNTER — PREP FOR PROCEDURE (OUTPATIENT)
Dept: TRANSPLANT | Facility: CLINIC | Age: 54
End: 2024-07-31

## 2024-07-31 ENCOUNTER — HOSPITAL ENCOUNTER (OUTPATIENT)
Facility: CLINIC | Age: 54
End: 2024-07-31
Attending: TRANSPLANT SURGERY | Admitting: TRANSPLANT SURGERY
Payer: COMMERCIAL

## 2024-07-31 DIAGNOSIS — Z94.4 LIVER REPLACED BY TRANSPLANT (H): Chronic | ICD-10-CM

## 2024-07-31 DIAGNOSIS — K40.90 HERNIA, INGUINAL: Primary | ICD-10-CM

## 2024-07-31 DIAGNOSIS — R79.89 ABNORMAL LIVER FUNCTION TESTS: ICD-10-CM

## 2024-07-31 LAB
ALBUMIN SERPL BCG-MCNC: 3.9 G/DL (ref 3.5–5.2)
ALP SERPL-CCNC: 218 U/L (ref 40–150)
ALT SERPL W P-5'-P-CCNC: 79 U/L (ref 0–70)
ANION GAP SERPL CALCULATED.3IONS-SCNC: 6 MMOL/L (ref 7–15)
AST SERPL W P-5'-P-CCNC: 52 U/L (ref 0–45)
BILIRUB DIRECT SERPL-MCNC: 0.49 MG/DL (ref 0–0.3)
BILIRUB SERPL-MCNC: 1.2 MG/DL
BUN SERPL-MCNC: 26.5 MG/DL (ref 6–20)
CALCIUM SERPL-MCNC: 9.8 MG/DL (ref 8.8–10.4)
CHLORIDE SERPL-SCNC: 107 MMOL/L (ref 98–107)
CREAT SERPL-MCNC: 1.42 MG/DL (ref 0.67–1.17)
CYCLOSPORINE BLD LC/MS/MS-MCNC: 760 UG/L (ref 50–400)
EGFRCR SERPLBLD CKD-EPI 2021: 59 ML/MIN/1.73M2
ERYTHROCYTE [DISTWIDTH] IN BLOOD BY AUTOMATED COUNT: 14.1 % (ref 10–15)
GLUCOSE SERPL-MCNC: 105 MG/DL (ref 70–99)
HCO3 SERPL-SCNC: 25 MMOL/L (ref 22–29)
HCT VFR BLD AUTO: 35.1 % (ref 40–53)
HGB BLD-MCNC: 11.3 G/DL (ref 13.3–17.7)
MAGNESIUM SERPL-MCNC: 2 MG/DL (ref 1.7–2.3)
MCH RBC QN AUTO: 30 PG (ref 26.5–33)
MCHC RBC AUTO-ENTMCNC: 32.2 G/DL (ref 31.5–36.5)
MCV RBC AUTO: 93 FL (ref 78–100)
PHOSPHATE SERPL-MCNC: 4.1 MG/DL (ref 2.5–4.5)
PLATELET # BLD AUTO: 206 10E3/UL (ref 150–450)
POTASSIUM SERPL-SCNC: 5.8 MMOL/L (ref 3.4–5.3)
PROT SERPL-MCNC: 6.7 G/DL (ref 6.4–8.3)
RBC # BLD AUTO: 3.77 10E6/UL (ref 4.4–5.9)
SODIUM SERPL-SCNC: 138 MMOL/L (ref 135–145)
TME LAST DOSE: ABNORMAL H
TME LAST DOSE: ABNORMAL H
WBC # BLD AUTO: 7.2 10E3/UL (ref 4–11)

## 2024-07-31 PROCEDURE — 80158 DRUG ASSAY CYCLOSPORINE: CPT

## 2024-07-31 PROCEDURE — 80053 COMPREHEN METABOLIC PANEL: CPT

## 2024-07-31 PROCEDURE — 84100 ASSAY OF PHOSPHORUS: CPT

## 2024-07-31 PROCEDURE — 85027 COMPLETE CBC AUTOMATED: CPT

## 2024-07-31 PROCEDURE — 36415 COLL VENOUS BLD VENIPUNCTURE: CPT

## 2024-07-31 PROCEDURE — 83735 ASSAY OF MAGNESIUM: CPT

## 2024-07-31 PROCEDURE — 82248 BILIRUBIN DIRECT: CPT

## 2024-07-31 NOTE — TELEPHONE ENCOUNTER
DATE:  7/31/2024     TIME OF RECEIPT FROM LAB:  1030 today 7/31    ORDERING PROVIDER: Dr Lynne    LAB TEST:  Cyclosporine level    LAB VALUE:  700    RESULTS GIVEN WITH READ-BACK TO (PROVIDER):  NA    TIME LAB VALUE REPORTED TO PROVIDER:   [id number]    Spoke with patient who confirms CSA level is NOT an accurate 12 hour trough level.  He states he took his CSA this morning prior to lab draw.    Kiera Tinsley RN   Transplant Coordinator  [phone]           Comment: enlarging over last 6 months, hx of inflammation. Will refer to Dr. White for excision. Notify clinic with changes before the procedure Detail Level: Zone Render Risk Assessment In Note?: no

## 2024-08-01 ENCOUNTER — TELEPHONE (OUTPATIENT)
Dept: TRANSPLANT | Facility: CLINIC | Age: 54
End: 2024-08-01
Payer: COMMERCIAL

## 2024-08-01 ENCOUNTER — PREP FOR PROCEDURE (OUTPATIENT)
Dept: TRANSPLANT | Facility: CLINIC | Age: 54
End: 2024-08-01
Payer: COMMERCIAL

## 2024-08-01 DIAGNOSIS — K40.90 HERNIA, INGUINAL: Primary | ICD-10-CM

## 2024-08-01 DIAGNOSIS — R79.89 ABNORMAL LIVER FUNCTION TESTS: Primary | ICD-10-CM

## 2024-08-01 RX ORDER — MYCOPHENOLIC ACID 180 MG/1
360 TABLET, DELAYED RELEASE ORAL 2 TIMES DAILY
Qty: 360 TABLET | Refills: 2 | Status: SHIPPED | OUTPATIENT
Start: 2024-08-01 | End: 2024-08-23

## 2024-08-01 NOTE — TELEPHONE ENCOUNTER
Call to Adina to confirm that CSA level from yesterday is incorrect.  Adina told them not to draw it but they did anyway.  LFT's a bit better, sent to Dr. Muniz to review. ERCP scheduled 8/13.  MMF was stopped 7/28

## 2024-08-01 NOTE — TELEPHONE ENCOUNTER
Message from Dr. Muniz in response to slightly elevated hepatic panel:    Hugo Daigle MD sent to Lore Mckeon RN  Caller: Unspecified (Today,  1:39 PM)  Lets restart him on MpA 360mg PO BID.     Adina aware, will start tonight. Script sent.

## 2024-08-01 NOTE — TELEPHONE ENCOUNTER
Labs yesterday:  Potassium 5.8, CSA level 760 and this was a 15 hour level.  Thinking it's likely that he took his dose prior to lab draw as labs were drawn at 10:20am.  Please confirm that this was an incorrectly drawn level

## 2024-08-05 ENCOUNTER — VIRTUAL VISIT (OUTPATIENT)
Dept: EDUCATION SERVICES | Facility: CLINIC | Age: 54
End: 2024-08-05
Payer: COMMERCIAL

## 2024-08-05 DIAGNOSIS — E11.9 TYPE 2 DIABETES MELLITUS WITHOUT COMPLICATION, WITH LONG-TERM CURRENT USE OF INSULIN (H): Primary | Chronic | ICD-10-CM

## 2024-08-05 DIAGNOSIS — Z79.4 TYPE 2 DIABETES MELLITUS WITHOUT COMPLICATION, WITH LONG-TERM CURRENT USE OF INSULIN (H): Primary | Chronic | ICD-10-CM

## 2024-08-05 PROCEDURE — G0108 DIAB MANAGE TRN  PER INDIV: HCPCS | Mod: 95 | Performed by: NUTRITIONIST

## 2024-08-05 NOTE — NURSING NOTE
Current patient location:  [place]    Is the patient currently in the state of [place]? YES    Visit mode:VIDEO    If the visit is dropped, the patient can be reconnected by: VIDEO VISIT: Text to cell phone:   Telephone Information:   Mobile [phone]   Mobile [phone]       Will anyone else be joining the visit? Spouse  (If patient encounters technical issues they should call [phone]    How would you like to obtain your AVS? MyChart    Are changes needed to the allergy or medication list? No    Are refills needed on medications prescribed by this physician? NO, spouse reported no changes to e-check in information for visit. VF did not review e-check in information again with her due to this.     Rooming Documentation:  Patient not present for questionnaires on video at time VF joined      Reason for visit: RECHECK    Autumn CHRISTY

## 2024-08-05 NOTE — PROGRESS NOTES
Virtual Visit Details    Type of service:  Video Visit     Originating Location (pt. Location): Home    Distant Location (provider location):  Off-site  Platform used for Video Visit: Yenifer    Diabetes Self-Management Education & Support    Mary Luisrowan Lopez presents today for education related to Type 2 diabetes    Patient is being treated with:  insulin  He is accompanied by spouse    Year of diagnosis: 2023  Referring provider:  Lai  Patient is followed by Suzanne THOMPSON, now followed by Geovanna THOMPSON  Living Situation: with spouse   Employment: n/a    PATIENT CONCERNS RELATED TO DIABETES SELF MANAGEMENT:       ASSESSMENT:    Taking Medication:     Current Diabetes Management per Patient:  Taking diabetes medications? yes:     Diabetes Medication(s)       Diabetic Other       Glucagon (GVOKE HYPOPEN) 1 MG/0.2ML pen Inject the contents of 1 device under the skin into lower abdomen, outer thigh, or outer upper arm as needed for hypoglycemia. If no response after 15 minutes, additional 1 mg dose from a new device may be injected while waiting for emergency assistance.       Insulin       insulin degludec (TRESIBA FLEXTOUCH) 100 UNIT/ML pen Inject 26 Units Subcutaneous every morning     Patient taking differently: Inject 13 Units subcutaneously every morning     Insulin Lispro-aabc, 1 U Dial, (LYUMJEV KWIKPEN) 100 UNIT/ML SOPN Inject 6 Units Subcutaneous 3 times daily (with meals) 6 units with meals, plus correction. Pt may use up to 30 units daily. This REPLACES Humalog/Novolog insulin.       Sodium-Glucose Co-Transporter 2 (SGLT2) Inhibitors       empagliflozin (JARDIANCE) 25 MG TABS tablet Take 1 tablet (25 mg) by mouth daily            Monitoring                      Patient's most recent   Lab Results   Component Value Date    A1C 8.1 10/31/2023        Healthy Eating:   encouraged consistent carb diet    Problem Solving:      Patient is at risk of hypoglycemia?: YES  Hospitalizations for hyper or  hypoglycemia: No      EDUCATION and INSTRUCTION PROVIDED AT THIS VISIT:      Follow up with Mary and Adina today.   Plan from last visit:   1) I will call the pharmacy and check with our team on the prior auth for Cequr and will follow up with you on what I find out.    2) Do your best to take insulin 10 minutes before eating to give it a chance to start working before you eat.    3) Mary will start doing some his own injections with the supervision of Adina.    4) Try to very much limit any sweet drinks. Opt for diet or sugar free beverages.    5) Increase Tresiba to 16 units tomorrow. Increase by 10% every couple days until fasting glucose is <150.  Next Doses:  19 units  23 units  We will assess readings again in one week during follow up    6) Increase mealtime insulin dose to 6 units    7) Take 2 units mealtime insulin with a carb containing snack.    Mary now has the Cequr patches and . Provided number to call to get training on how to start.   Tresiba has been increased to 16 units. Glucose is improved. Mary kept track of his food intake and insulin doses this past week. When he gets his meal dose plus correction before eating this usually results in good control. Elevations are from missed boluses. Mary has been using 4 units with snacks.     PLAN:  Number provided to schedule Cequr training.  Continue insulin doses. Once on Cequr will take 3 clicks (6 units) with meals and 2 clicks (4 units) with snacks.  Send me a message to let me know when training will be so we can schedule a follow up a couple days later.     Patient-stated goal written and given to Mary Lopez.  Verbalized and demonstrated understanding of instructions.       FOLLOW-UP:      Joined the call at 8/5/2024, 7:43:11 am.  Left the call at 8/5/2024, 8:19:39 am.  You were on the call for 36 minutes 28 seconds     Any diabetes medication dose changes were made via the CDE Protocol and Collaborative Practice Agreement  with [facility].  A copy of this encounter was provided to patient's referring provider.

## 2024-08-05 NOTE — PATIENT INSTRUCTIONS
Setting up Dexcom Follow  How do I share my Dexcom G7 glucose data with family and followers?  Dexcom Share is a feature within the Dexcom G7 emy that allows for remote monitoring. One person (the Sharer) may share their CGM data with up to 10 designated individuals (the Followers), such as family and friends. To set up the Share feature in the Dexcom G7 emy, go to Connections > Share and follow the onscreen instructions. Always make treatment decisions using the primary Dexcom G7 emy, not the Dexcom Follow emy.  *Separate Dexcom Follow emy required. Internet connectivity required for data sharing. Followers should always confirm readings on the Dexcom G7 emy or  before making treatment decisions.  Followers must have compatible smart devices to use the Dexcom Follow emy: [email or web address]  Compatible smart devices sold separately. To view a list of compatible smart devices, visit [email or web address]

## 2024-08-06 ENCOUNTER — LAB (OUTPATIENT)
Dept: LAB | Facility: CLINIC | Age: 54
End: 2024-08-06
Payer: COMMERCIAL

## 2024-08-06 DIAGNOSIS — Z94.4 LIVER REPLACED BY TRANSPLANT (H): Chronic | ICD-10-CM

## 2024-08-06 LAB
ALBUMIN SERPL BCG-MCNC: 4.1 G/DL (ref 3.5–5.2)
ALP SERPL-CCNC: 184 U/L (ref 40–150)
ALT SERPL W P-5'-P-CCNC: 52 U/L (ref 0–70)
ANION GAP SERPL CALCULATED.3IONS-SCNC: 7 MMOL/L (ref 7–15)
AST SERPL W P-5'-P-CCNC: 43 U/L (ref 0–45)
BILIRUB DIRECT SERPL-MCNC: 0.32 MG/DL (ref 0–0.3)
BILIRUB SERPL-MCNC: 0.8 MG/DL
BUN SERPL-MCNC: 39.2 MG/DL (ref 6–20)
CALCIUM SERPL-MCNC: 10 MG/DL (ref 8.8–10.4)
CHLORIDE SERPL-SCNC: 106 MMOL/L (ref 98–107)
CREAT SERPL-MCNC: 1.49 MG/DL (ref 0.67–1.17)
EGFRCR SERPLBLD CKD-EPI 2021: 56 ML/MIN/1.73M2
ERYTHROCYTE [DISTWIDTH] IN BLOOD BY AUTOMATED COUNT: 13.5 % (ref 10–15)
GLUCOSE SERPL-MCNC: 176 MG/DL (ref 70–99)
HCO3 SERPL-SCNC: 25 MMOL/L (ref 22–29)
HCT VFR BLD AUTO: 35.2 % (ref 40–53)
HGB BLD-MCNC: 11.1 G/DL (ref 13.3–17.7)
MAGNESIUM SERPL-MCNC: 2 MG/DL (ref 1.7–2.3)
MCH RBC QN AUTO: 30.3 PG (ref 26.5–33)
MCHC RBC AUTO-ENTMCNC: 31.5 G/DL (ref 31.5–36.5)
MCV RBC AUTO: 96 FL (ref 78–100)
PHOSPHATE SERPL-MCNC: 4.6 MG/DL (ref 2.5–4.5)
PLATELET # BLD AUTO: 194 10E3/UL (ref 150–450)
POTASSIUM SERPL-SCNC: 5.9 MMOL/L (ref 3.4–5.3)
PROT SERPL-MCNC: 6.9 G/DL (ref 6.4–8.3)
RBC # BLD AUTO: 3.66 10E6/UL (ref 4.4–5.9)
SODIUM SERPL-SCNC: 138 MMOL/L (ref 135–145)
WBC # BLD AUTO: 5.6 10E3/UL (ref 4–11)

## 2024-08-06 PROCEDURE — 85027 COMPLETE CBC AUTOMATED: CPT

## 2024-08-06 PROCEDURE — 36415 COLL VENOUS BLD VENIPUNCTURE: CPT

## 2024-08-06 PROCEDURE — 83735 ASSAY OF MAGNESIUM: CPT

## 2024-08-06 PROCEDURE — 84100 ASSAY OF PHOSPHORUS: CPT

## 2024-08-06 PROCEDURE — 80053 COMPREHEN METABOLIC PANEL: CPT

## 2024-08-06 PROCEDURE — 82248 BILIRUBIN DIRECT: CPT

## 2024-08-06 PROCEDURE — 80158 DRUG ASSAY CYCLOSPORINE: CPT

## 2024-08-07 ENCOUNTER — TELEPHONE (OUTPATIENT)
Dept: TRANSPLANT | Facility: CLINIC | Age: 54
End: 2024-08-07
Payer: COMMERCIAL

## 2024-08-07 DIAGNOSIS — E87.5 HYPERKALEMIA: Primary | ICD-10-CM

## 2024-08-07 LAB
CYCLOSPORINE BLD LC/MS/MS-MCNC: 216 UG/L (ref 50–400)
TME LAST DOSE: NORMAL H
TME LAST DOSE: NORMAL H

## 2024-08-07 RX ORDER — FLUDROCORTISONE ACETATE 0.1 MG/1
0.1 TABLET ORAL DAILY
Qty: 30 TABLET | Refills: 1 | Status: SHIPPED | OUTPATIENT
Start: 2024-08-07 | End: 2024-10-07

## 2024-08-07 ASSESSMENT — ENCOUNTER SYMPTOMS: NEW SYMPTOMS OF CORONARY ARTERY DISEASE: 0

## 2024-08-07 NOTE — TELEPHONE ENCOUNTER
"Spoke to Adina. Potassium yesterday was 5.9, creatinine up a bit.  Mary's blood sugars have been in the 200's, he is on Jardiance and taking is taking insulin, this is managed by his endocrinology team.  He has been taking an energy drink and  / or gatorade because he just doesn't feel \" hydrated enough\".  I explained that he is not getting \"more hydrated\" by drinking sports drinks and that this contributes to elevated potassium and blood sugars which we don't want.  She will encourage him not to drink these, to stick w/ water.    I increased his florinef to daily, he is going in for a pre-op for ERCP tomorrow morning, will get a repeat potassium at this time.   Liver tests have come down, recent CSA level 216, left dose as is until I see lft's and other labs again next Monday.   "

## 2024-08-08 ENCOUNTER — PATIENT OUTREACH (OUTPATIENT)
Dept: CARE COORDINATION | Facility: CLINIC | Age: 54
End: 2024-08-08

## 2024-08-08 ENCOUNTER — LAB (OUTPATIENT)
Dept: LAB | Facility: CLINIC | Age: 54
End: 2024-08-08
Payer: COMMERCIAL

## 2024-08-08 ENCOUNTER — OFFICE VISIT (OUTPATIENT)
Dept: FAMILY MEDICINE | Facility: CLINIC | Age: 54
End: 2024-08-08
Payer: COMMERCIAL

## 2024-08-08 VITALS
HEART RATE: 89 BPM | BODY MASS INDEX: 27.89 KG/M2 | SYSTOLIC BLOOD PRESSURE: 116 MMHG | TEMPERATURE: 97.9 F | OXYGEN SATURATION: 99 % | WEIGHT: 184 LBS | HEIGHT: 68 IN | DIASTOLIC BLOOD PRESSURE: 87 MMHG | RESPIRATION RATE: 16 BRPM

## 2024-08-08 DIAGNOSIS — F10.91 ALCOHOL USE DISORDER IN REMISSION: ICD-10-CM

## 2024-08-08 DIAGNOSIS — R10.9 ABDOMINAL WALL PAIN: ICD-10-CM

## 2024-08-08 DIAGNOSIS — Z01.818 PRE-OP EXAM: Primary | ICD-10-CM

## 2024-08-08 DIAGNOSIS — K40.90 RIGHT INGUINAL HERNIA: ICD-10-CM

## 2024-08-08 DIAGNOSIS — E78.5 MILD HYPERLIPIDEMIA: Chronic | ICD-10-CM

## 2024-08-08 DIAGNOSIS — E87.5 HYPERKALEMIA: ICD-10-CM

## 2024-08-08 DIAGNOSIS — D84.9 IMMUNOSUPPRESSED STATUS (H): Chronic | ICD-10-CM

## 2024-08-08 DIAGNOSIS — Z79.4 TYPE 2 DIABETES MELLITUS WITHOUT COMPLICATION, WITH LONG-TERM CURRENT USE OF INSULIN (H): Chronic | ICD-10-CM

## 2024-08-08 DIAGNOSIS — E11.9 TYPE 2 DIABETES MELLITUS WITHOUT COMPLICATION, WITH LONG-TERM CURRENT USE OF INSULIN (H): Chronic | ICD-10-CM

## 2024-08-08 DIAGNOSIS — Z94.4 LIVER REPLACED BY TRANSPLANT (H): Chronic | ICD-10-CM

## 2024-08-08 DIAGNOSIS — N18.31 CKD STAGE 3A, GFR 45-59 ML/MIN (H): ICD-10-CM

## 2024-08-08 DIAGNOSIS — G47.00 PERSISTENT INSOMNIA: Chronic | ICD-10-CM

## 2024-08-08 DIAGNOSIS — T85.520S: ICD-10-CM

## 2024-08-08 PROBLEM — I10 PRIMARY HYPERTENSION: Status: RESOLVED | Noted: 2020-03-11 | Resolved: 2024-08-08

## 2024-08-08 LAB
CHOLEST SERPL-MCNC: 148 MG/DL
HDLC SERPL-MCNC: 43 MG/DL
LDLC SERPL CALC-MCNC: 75 MG/DL
NONHDLC SERPL-MCNC: 105 MG/DL
POTASSIUM SERPL-SCNC: 5.2 MMOL/L (ref 3.4–5.3)
TRIGL SERPL-MCNC: 151 MG/DL

## 2024-08-08 PROCEDURE — 36415 COLL VENOUS BLD VENIPUNCTURE: CPT

## 2024-08-08 PROCEDURE — 84132 ASSAY OF SERUM POTASSIUM: CPT

## 2024-08-08 PROCEDURE — 80061 LIPID PANEL: CPT

## 2024-08-08 PROCEDURE — 99214 OFFICE O/P EST MOD 30 MIN: CPT | Performed by: INTERNAL MEDICINE

## 2024-08-08 ASSESSMENT — PAIN SCALES - GENERAL: PAINLEVEL: NO PAIN (0)

## 2024-08-08 NOTE — PROGRESS NOTES
Clinical Product Navigator RN reviewed chart; patient on payer product coverage.  Review results:   CPN Initial Information Gathering  Referral Source: Health Plan    Patient identified by [facility] for RN Clinical Product Navigator review.   Patient has had 2 ED visits, 6 hospital admissions in the past 12 months. PMHx Laennec's Cirrhosis, DDLT 3/5/24 c/b acute T cell-mediated rejection, HTN, HLD, DM2, pulmonary hypertension, atrial fibrillation, CKD II, chronic anemia.  Care Team: [facility] PCP, Transplant, Hepatology, Endocrinology, Cardiology, MTM Pharmacist, Diabetews Education, SOT Care Coordination.  No further action at this time, as patient is followed closely by his transplant team.    Melissa Behl BSN, RN, PHN, [facility]  RN Clinical Product Navigator  [phone]

## 2024-08-08 NOTE — PROGRESS NOTES
Preoperative Evaluation  [address] [address]  Phone: [phone]  Primary Provider: Jimmie Hoyt MD  Pre-op Performing Provider: Jimmie Hoyt MD  Aug 8, 2024             8/8/2024   Surgical Information   What procedure is being done? pre opp   Facility or Hospital where procedure/surgery will be performed: [facility]   Who is doing the procedure / surgery? dr prince   Date of surgery / procedure: aug 23   Time of surgery / procedure: na   Where do you plan to recover after surgery? at home with family        Fax number for surgical facility: Note does not need to be faxed, will be available electronically in Epic.    Assessment & Plan     1.  Preop physical exam completed.  Patient is medically optimized to undergo the planned surgical procedure.  2.  Right inguinal hernia-symptomatic.  Patient to undergo open inguinal repair.  3.  Biliary stent migration, sequela.  Based on CT scan 7 4/24/2024, biliary stent is in the extrahepatic biliary system.  4.  Liver replaced by transplant on 3/5/2024.  5.  Immunosuppressed status.  6.  Type 2 diabetes mellitus with reasonably good glycemic control with Tresiba, lispro and empagliflozin.  Last A1c 7.3 on 6/21/2024.  7.  Chronic kidney disease stage IIIa.  8.  Mild hyperkalemia.  Electrolytes been to be done today and will assess.  Patient not on medication that would result in hyperkalemia.  9.  Persistent insomnia doing much better sleeping better now.  10.  Alcohol use disorder in remission.  11.  Mild hyperlipidemia in the past.  I will check lipid profile out.  12.  Abdominal wall pain.  Informed patient that the pain he was experiencing appears to be in the abdominal wall muscles and not intra-abdominal pathology.      The proposed surgical procedure is considered INTERMEDIATE risk.    Recommendation  Approval given to proceed with proposed procedure, without further diagnostic evaluation.    Subjective    Mary is a 53 year old, presenting for the following:  Pre-Op Exam ( 8/13/2024  Dr. Rodriguez   Endoscopic Retrograde Cholangiopancreatography  (Dr. Rodriguez)/8/23/2024  Open Inquinal Hernia Repair (Dr. Cortez))          8/8/2024    11:06 AM   Additional Questions   Roomed by Diane Lynne Schoenherr RN     HPI related to upcoming procedure:     59-year-old gentleman with past history of alcohol abuse, status post liver transplant in March 2024 has a symptomatic reducible right inguinal hernia for which she is to undergo open repair.  He has been feeling really good and has no current complaints except that he has ongoing persistent pain of the abdomen in the mid and lower region.  On touching the abdominal wall its tender.  He has no change in bowel habits and otherwise feels good.  No chest pain or shortness of breath.  His glycemic control has been good.  He is on continuous glucose monitoring device.  In the near future he is going to be switching from Tresiba to Omnipod.          8/8/2024   Pre-Op Questionnaire   Have you ever had a heart attack or stroke? No   Have you ever had surgery on your heart or blood vessels, such as a stent placement, a coronary artery bypass, or surgery on an artery in your head, neck, heart, or legs? No   Do you have chest pain with activity? No   Do you have a history of heart failure? No   Do you currently have a cold, bronchitis or symptoms of other infection? No   Do you have a cough, shortness of breath, or wheezing? No   Do you or anyone in your family have previous history of blood clots? No   Do you or does anyone in your family have a serious bleeding problem such as prolonged bleeding following surgeries or cuts? No   Have you ever had problems with anemia or been told to take iron pills? No   Have you had any abnormal blood loss such as black, tarry or bloody stools? No   Have you ever had a blood transfusion? No   Are you willing to have a blood transfusion if it is  medically needed before, during, or after your surgery? Yes   Have you or any of your relatives ever had problems with anesthesia? No   Do you have sleep apnea, excessive snoring or daytime drowsiness? No   Do you have any artifical heart valves or other implanted medical devices like a pacemaker, defibrillator, or continuous glucose monitor? No   Do you have artificial joints? No   Are you allergic to latex? 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     Priority: Medium    Ganglion cyst of wrist, right 04/18/2024     Priority: Medium    Hypomagnesemia 03/21/2024     Priority: Medium    Immunosuppressed status (H24) 03/20/2024     Priority: Medium    Liver replaced by transplant (H) 03/05/2024     Priority: Medium    Type 2 diabetes mellitus without complication, with long-term current use of insulin (H) 11/22/2023     Priority: Medium    Persistent insomnia 07/13/2023     Priority: Medium    Mild hyperlipidemia 12/07/2021     Priority: Medium    Primary hypertension 03/11/2020     Priority: Medium    Alcohol use disorder, severe, in early remission (H) 03/26/2014     Priority: Medium     Sober since 6/2023        Past Medical History:   Diagnosis Date    ANGEL (acute kidney injury) (H24)     Alcoholic cirrhosis of liver without ascites (H) 07/13/2023    Alcoholic hepatitis with ascites (H28) 10/03/2023    Alcoholic hepatitis without ascites (H28) 07/13/2023    Closed fracture of one rib of left side 09/14/2023    Concussion without loss of consciousness 03/11/2020    Decompensated hepatic cirrhosis (H) 09/15/2023    Diabetes mellitus, type 2 (H) 11/22/2023    Essential hypertension 03/11/2020    Ganglion cyst of wrist, right 04/18/2024    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)     Liver replaced by transplant (H) 03/05/2024    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    ACR PRAJAPATI 6-7/9, treated with steroids    Mild hyperlipidemia 2021    Persistent insomnia 2023    Portal hypertension (H) 2023    Scrotal abscess     Secondary esophageal 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 (H) 2023    Tobacco abuse disorder 2020    Type 2 diabetes mellitus with hyperglycemia (H) 2023     Past Surgical History:   Procedure Laterality Date    BENCH LIVER  3/5/2024    Procedure: Bench liver;  Surgeon: Ryder Cortez MD;  Location: [facility]    CHOLECYSTECTOMY      COLONOSCOPY N/A 2024    Procedure: COLONOSCOPY, WITH POLYPECTOMY;  Surgeon: Jak Urbina MD;  Location: [facility]    CV RIGHT HEART CATH MEASUREMENTS RECORDED N/A 2024    Procedure: Right Heart Catheterization;  Surgeon: Alfred Tafoya MD;  [facility]    ENTEROSCOPY SMALL BOWEL N/A 2024    Procedure: Enteroscopy small bowel;  Surgeon: Hugo Daigle MD;  Location: [facility]    ESOPHAGOSCOPY, GASTROSCOPY, DUODENOSCOPY (EGD), COMBINED N/A 2024    Procedure: Esophagoscopy, gastroscopy, duodenoscopy (EGD), combined;  Surgeon: Hugo Daigle MD;  Location: [facility]    IR LIVER BIOPSY PERCUTANEOUS  3/15/2024    IR RETROPERITONEAL ABSCESS DRAINAGE  2024    TONSILLECTOMY      TRANSPLANT LIVER RECIPIENT  DONOR N/A 3/5/2024    Procedure: Transplant liver recipient  donor, bile duct stent placement;  Surgeon: Ryder Cortez MD;  Location: [facility] OR    VASECTOMY       Current Outpatient Medications   Medication Sig Dispense Refill    acetaminophen (TYLENOL) 325 MG tablet Take 3 tablets (975 mg) by mouth every 8 hours as needed for mild pain Try first before Tramadol.      aspirin (ASA) 325 MG tablet 1 tablet (325 mg) by Oral or Feeding Tube route daily for 65 days 60 tablet 1    blood glucose (NO BRAND SPECIFIED) test strip Use to test blood sugar two times daily or as directed. To accompany: Blood Glucose  Monitor Brands: per insurance. 100 strip 6    blood glucose monitoring (NO BRAND SPECIFIED) meter device kit Use to test blood sugar twice times daily or as directed. Preferred blood glucose meter OR supplies to accompany: Blood Glucose Monitor Brands: per insurance. 1 kit 0    Continuous Blood Gluc Sensor (DEXCOM G7 SENSOR) MISC Change every 10 days. 3 each 5    Continuous Blood Gluc Sensor (DEXCOM G7 SENSOR) MISC Change every 10 days. 3 each 5    cycloSPORINE modified (GENERIC EQUIVALENT) 100 MG capsule Take 2 capsules (200 mg) by mouth every 12 hours Total dose: 275 mg  capsule 11    cycloSPORINE modified (GENERIC EQUIVALENT) 25 MG capsule Take 3 capsules (75 mg) by mouth every 12 hours Total dose: 275mg  capsule 3    dapsone (ACZONE) 25 MG tablet Take 2 tablets (50 mg) by mouth daily for 65 days Medication complete when pills run out. 132 tablet 0    empagliflozin (JARDIANCE) 25 MG TABS tablet Take 1 tablet (25 mg) by mouth daily 90 tablet 1    fludrocortisone (FLORINEF) 0.1 MG tablet Take 1 tablet (0.1 mg) by mouth daily 30 tablet 1    Glucagon (GVOKE HYPOPEN) 1 MG/0.2ML pen Inject the contents of 1 device under the skin into lower abdomen, outer thigh, or outer upper arm as needed for hypoglycemia. If no response after 15 minutes, additional 1 mg dose from a new device may be injected while waiting for emergency assistance. 0.4 mL 1    Injection Device for insulin (CEQUR SIMPLICITY 2U) KHUSHBOO 1 each every 3 days 10 each 5    insulin degludec (TRESIBA FLEXTOUCH) 100 UNIT/ML pen Inject 26 Units Subcutaneous every morning (Patient taking differently: Inject 13 Units subcutaneously every morning) 15 mL 5    Insulin Disposable Pump (OMNIPOD 5 G6 PODS, GEN 5,) MISC 1 each every 3 days Change every 3 days. (Patient taking differently: 1 each every 3 days Change every 3 days. Patient has not started insulin pump therapy) 10 each 0    Insulin Lispro-aabc, 1 U Dial, (LYUMJEV KWIKPEN) 100 UNIT/ML SOPN Inject  6 Units Subcutaneous 3 times daily (with meals) 6 units with meals, plus correction. Pt may use up to 30 units daily. This REPLACES Humalog/Novolog insulin. 30 mL 2    insulin pen needle (29G X 12.7MM) 29G X 12.7MM miscellaneous Use 1 pen needle every three days or as directed. 90 each 1    insulin pen needle (32G X 4 MM) 32G X 4 MM miscellaneous Use as directed by provider Per insurance coverage 200 each 1    insulin pen needle (BD PEN NEEDLE SHERRIE 2ND GEN) 32G X 4 MM miscellaneous USES 5 PER  each 11    Magnesium Bisglycinate (MAG GLYCINATE) 100 MG TABS Take 2 tablets (200 mg) by mouth 2 times daily 360 tablet 3    melatonin 5 MG tablet Take 1 tablet (5 mg) by mouth daily (with dinner) Take daily at dusk. 30 tablet 2    multivitamin w/minerals (THERA-VIT-M) tablet TAKE 1 TABLET BY MOUTH DAILY 90 tablet 1    mycophenolic acid (GENERIC EQUIVALENT) 180 MG EC tablet Take 2 tablets (360 mg) by mouth 2 times daily for 360 days 360 tablet 2    omeprazole (PRILOSEC) 20 MG DR capsule Take 20 mg by mouth daily 180 capsule 1    QUEtiapine (SEROQUEL) 25 MG tablet Take 1-2 tablets (25-50 mg) by mouth nightly as needed (Sleep)      sodium zirconium cyclosilicate (LOKELMA) 10 g PACK packet Take 1 packet (10 g) by mouth daily as needed (Hyperkalemia) 30 each 0    thin (NO BRAND SPECIFIED) lancets Use with lanceting device. To accompany: Blood Glucose Monitor Brands: per insurance. 100 each 6    ursodiol (ACTIGALL) 300 MG capsule Take 1 capsule (300 mg) by mouth 2 times daily 180 capsule 3       Allergies   Allergen Reactions    Other Food Allergy Anaphylaxis     Legumes (black beans, baked beans, chickpeas)    Pineapple Itching        Social History     Tobacco Use    Smoking status: Former     Types: Cigarettes     Passive exposure: Never    Smokeless tobacco: Current     Types: Chew     Last attempt to quit: 1/1/2004    Tobacco comments:     Chew daily 1/8 of a tin per day   Substance Use Topics    Alcohol use: Not  "Currently     Alcohol/week: 12.0 standard drinks of alcohol     Types: 12 Standard drinks or equivalent per week     Comment: Sober since 6/25/2023     Family History   Problem Relation Age of Onset    Anxiety Disorder Mother     Depression Mother     Bipolar Disorder Mother     Chronic Obstructive Pulmonary Disease Mother     Lung Cancer Mother 81    Morbid Obesity Father     Diabetes Father     Diabetes Type 2  Brother     Substance Abuse Maternal Grandfather     Substance Abuse Paternal Grandfather     Colon Cancer No family hx of     Liver Disease No family hx of      History   Drug Use Unknown             Review of Systems  Constitutional, HEENT, cardiovascular, pulmonary, GI, , musculoskeletal, neuro, skin, endocrine and psych systems are negative, except as otherwise noted.    Objective    /87 (BP Location: Right arm, Patient Position: Sitting, Cuff Size: Adult Large)   Pulse 89   Temp 97.9  F (36.6  C) (Oral)   Resp 16   Ht 1.727 m (5' 8\")   Wt 83.5 kg (184 lb)   SpO2 99%   BMI 27.98 kg/m     Estimated body mass index is 27.98 kg/m  as calculated from the following:    Height as of this encounter: 1.727 m (5' 8\").    Weight as of this encounter: 83.5 kg (184 lb).  Physical Exam  GENERAL: alert and no distress  EYES: Eyes grossly normal to inspection, PERRL and conjunctivae and sclerae normal  HENT: ear canals and TM's normal, nose and mouth without ulcers or lesions  NECK: no adenopathy, no asymmetry, masses, or scars  RESP: lungs clear to auscultation - no rales, rhonchi or wheezes  CV: regular rate and rhythm, normal S1 S2, no S3 or S4, no murmur, click or rub, no peripheral edema  ABDOMEN: soft, nontender, no hepatosplenomegaly, no masses and bowel sounds normal  MS: no gross musculoskeletal defects noted, no edema  SKIN: no suspicious lesions or rashes  NEURO: Normal strength and tone, mentation intact and speech normal  PSYCH: mentation appears normal, affect normal/bright    Recent Labs "   Lab Test 08/06/24  0737 07/31/24  1020 06/22/24  2109 06/21/24  1013 04/01/24  1339 04/01/24  0924 03/31/24  1511 03/31/24  1339 03/11/24  1149 03/11/24  0420   HGB 11.1* 11.3*   < > 13.9   < >  --    < >  --    < > 8.5*    206   < > 170   < >  --    < >  --    < > 59*   INR  --   --   --   --   --   --   --  1.13  --  1.18*    138   < > 131*   < >  --    < >  --    < > 137   POTASSIUM 5.9* 5.8*   < > 7.3*   < >  --    < >  --    < > 3.2*   CR 1.49* 1.42*   < > 2.15*   < >  --    < >  --    < > 0.79   A1C  --   --   --  7.3*  --  5.6  --   --   --   --     < > = values in this interval not displayed.        Diagnostics     No EKG required for low risk surgery (cataract, skin procedure, breast biopsy, etc).  No EKG required, no history of coronary heart disease, significant arrhythmia, peripheral arterial disease or other structural heart disease.    Revised Cardiac Risk Index (RCRI)  The patient has the following serious cardiovascular risks for perioperative complications:   - No serious cardiac risks = 0 points     RCRI Interpretation: 0 points: Class I (very low risk - 0.4% complication rate)         Signed Electronically by: Jimmie Hoyt MD  A copy of this evaluation report is provided to the requesting physician.

## 2024-08-12 ENCOUNTER — LAB (OUTPATIENT)
Dept: LAB | Facility: CLINIC | Age: 54
End: 2024-08-12
Payer: COMMERCIAL

## 2024-08-12 DIAGNOSIS — Z94.4 LIVER REPLACED BY TRANSPLANT (H): Chronic | ICD-10-CM

## 2024-08-12 LAB
ALBUMIN SERPL BCG-MCNC: 3.8 G/DL (ref 3.5–5.2)
ALP SERPL-CCNC: 167 U/L (ref 40–150)
ALT SERPL W P-5'-P-CCNC: 41 U/L (ref 0–70)
ANION GAP SERPL CALCULATED.3IONS-SCNC: 8 MMOL/L (ref 7–15)
AST SERPL W P-5'-P-CCNC: 30 U/L (ref 0–45)
BILIRUB DIRECT SERPL-MCNC: 0.27 MG/DL (ref 0–0.3)
BILIRUB SERPL-MCNC: 0.6 MG/DL
BUN SERPL-MCNC: 30.1 MG/DL (ref 6–20)
CALCIUM SERPL-MCNC: 9.3 MG/DL (ref 8.8–10.4)
CHLORIDE SERPL-SCNC: 106 MMOL/L (ref 98–107)
CREAT SERPL-MCNC: 1.33 MG/DL (ref 0.67–1.17)
CYCLOSPORINE BLD LC/MS/MS-MCNC: 246 UG/L (ref 50–400)
EGFRCR SERPLBLD CKD-EPI 2021: 64 ML/MIN/1.73M2
ERYTHROCYTE [DISTWIDTH] IN BLOOD BY AUTOMATED COUNT: 13.2 % (ref 10–15)
GLUCOSE SERPL-MCNC: 214 MG/DL (ref 70–99)
HCO3 SERPL-SCNC: 24 MMOL/L (ref 22–29)
HCT VFR BLD AUTO: 30.6 % (ref 40–53)
HGB BLD-MCNC: 10.3 G/DL (ref 13.3–17.7)
MAGNESIUM SERPL-MCNC: 1.9 MG/DL (ref 1.7–2.3)
MCH RBC QN AUTO: 31.3 PG (ref 26.5–33)
MCHC RBC AUTO-ENTMCNC: 33.7 G/DL (ref 31.5–36.5)
MCV RBC AUTO: 93 FL (ref 78–100)
PHOSPHATE SERPL-MCNC: 3.9 MG/DL (ref 2.5–4.5)
PLATELET # BLD AUTO: 174 10E3/UL (ref 150–450)
POTASSIUM SERPL-SCNC: 4.9 MMOL/L (ref 3.4–5.3)
PROT SERPL-MCNC: 6.2 G/DL (ref 6.4–8.3)
RBC # BLD AUTO: 3.29 10E6/UL (ref 4.4–5.9)
SODIUM SERPL-SCNC: 138 MMOL/L (ref 135–145)
TME LAST DOSE: NORMAL H
TME LAST DOSE: NORMAL H
WBC # BLD AUTO: 4.6 10E3/UL (ref 4–11)

## 2024-08-12 PROCEDURE — 84100 ASSAY OF PHOSPHORUS: CPT

## 2024-08-12 PROCEDURE — 82248 BILIRUBIN DIRECT: CPT

## 2024-08-12 PROCEDURE — 80158 DRUG ASSAY CYCLOSPORINE: CPT

## 2024-08-12 PROCEDURE — 36415 COLL VENOUS BLD VENIPUNCTURE: CPT

## 2024-08-12 PROCEDURE — 80053 COMPREHEN METABOLIC PANEL: CPT

## 2024-08-12 PROCEDURE — 83735 ASSAY OF MAGNESIUM: CPT

## 2024-08-12 PROCEDURE — 85027 COMPLETE CBC AUTOMATED: CPT

## 2024-08-13 ENCOUNTER — ANESTHESIA EVENT (OUTPATIENT)
Dept: SURGERY | Facility: CLINIC | Age: 54
End: 2024-08-13
Payer: COMMERCIAL

## 2024-08-13 ENCOUNTER — APPOINTMENT (OUTPATIENT)
Dept: GENERAL RADIOLOGY | Facility: CLINIC | Age: 54
End: 2024-08-13
Attending: INTERNAL MEDICINE
Payer: COMMERCIAL

## 2024-08-13 ENCOUNTER — ANESTHESIA (OUTPATIENT)
Dept: SURGERY | Facility: CLINIC | Age: 54
End: 2024-08-13
Payer: COMMERCIAL

## 2024-08-13 ENCOUNTER — HOSPITAL ENCOUNTER (OUTPATIENT)
Facility: CLINIC | Age: 54
Discharge: HOME | End: 2024-08-13
Attending: INTERNAL MEDICINE | Admitting: INTERNAL MEDICINE
Payer: COMMERCIAL

## 2024-08-13 VITALS
SYSTOLIC BLOOD PRESSURE: 141 MMHG | HEIGHT: 68 IN | BODY MASS INDEX: 29.24 KG/M2 | OXYGEN SATURATION: 98 % | HEART RATE: 56 BPM | TEMPERATURE: 97.8 F | WEIGHT: 192.9 LBS | RESPIRATION RATE: 14 BRPM | DIASTOLIC BLOOD PRESSURE: 88 MMHG

## 2024-08-13 DIAGNOSIS — Z94.4 S/P LIVER TRANSPLANT (H): ICD-10-CM

## 2024-08-13 LAB
ALBUMIN SERPL BCG-MCNC: 3.8 G/DL (ref 3.5–5.2)
ALP SERPL-CCNC: 153 U/L (ref 40–150)
ALT SERPL W P-5'-P-CCNC: 35 U/L (ref 0–70)
AMYLASE SERPL-CCNC: 34 U/L (ref 28–100)
ANION GAP SERPL CALCULATED.3IONS-SCNC: 10 MMOL/L (ref 7–15)
AST SERPL W P-5'-P-CCNC: 32 U/L (ref 0–45)
BILIRUB SERPL-MCNC: 0.7 MG/DL
BUN SERPL-MCNC: 29.2 MG/DL (ref 6–20)
CALCIUM SERPL-MCNC: 9.3 MG/DL (ref 8.8–10.4)
CHLORIDE SERPL-SCNC: 110 MMOL/L (ref 98–107)
CREAT SERPL-MCNC: 1.27 MG/DL (ref 0.67–1.17)
EGFRCR SERPLBLD CKD-EPI 2021: 68 ML/MIN/1.73M2
ERCP: NORMAL
ERYTHROCYTE [DISTWIDTH] IN BLOOD BY AUTOMATED COUNT: 13.4 % (ref 10–15)
GLUCOSE BLDC GLUCOMTR-MCNC: 166 MG/DL (ref 70–99)
GLUCOSE BLDC GLUCOMTR-MCNC: 265 MG/DL (ref 70–99)
GLUCOSE BLDC GLUCOMTR-MCNC: 333 MG/DL (ref 70–99)
GLUCOSE SERPL-MCNC: 150 MG/DL (ref 70–99)
HCO3 SERPL-SCNC: 20 MMOL/L (ref 22–29)
HCT VFR BLD AUTO: 32.3 % (ref 40–53)
HGB BLD-MCNC: 10.4 G/DL (ref 13.3–17.7)
INR PPP: 1.12 (ref 0.85–1.15)
LIPASE SERPL-CCNC: 10 U/L (ref 13–60)
MCH RBC QN AUTO: 30.8 PG (ref 26.5–33)
MCHC RBC AUTO-ENTMCNC: 32.2 G/DL (ref 31.5–36.5)
MCV RBC AUTO: 96 FL (ref 78–100)
PLATELET # BLD AUTO: 166 10E3/UL (ref 150–450)
POTASSIUM SERPL-SCNC: 4.2 MMOL/L (ref 3.4–5.3)
PROT SERPL-MCNC: 6.5 G/DL (ref 6.4–8.3)
RBC # BLD AUTO: 3.38 10E6/UL (ref 4.4–5.9)
SODIUM SERPL-SCNC: 140 MMOL/L (ref 135–145)
WBC # BLD AUTO: 4 10E3/UL (ref 4–11)

## 2024-08-13 PROCEDURE — 360N000082 HC SURGERY LEVEL 2 W/ FLUORO, PER MIN: Performed by: INTERNAL MEDICINE

## 2024-08-13 PROCEDURE — 999N000141 HC STATISTIC PRE-PROCEDURE NURSING ASSESSMENT: Performed by: INTERNAL MEDICINE

## 2024-08-13 PROCEDURE — 82150 ASSAY OF AMYLASE: CPT | Performed by: INTERNAL MEDICINE

## 2024-08-13 PROCEDURE — 82962 GLUCOSE BLOOD TEST: CPT

## 2024-08-13 PROCEDURE — C1769 GUIDE WIRE: HCPCS | Performed by: INTERNAL MEDICINE

## 2024-08-13 PROCEDURE — 43276 ERCP STENT EXCHANGE W/DILATE: CPT | Performed by: ANESTHESIOLOGY

## 2024-08-13 PROCEDURE — 43276 ERCP STENT EXCHANGE W/DILATE: CPT | Performed by: NURSE ANESTHETIST, CERTIFIED REGISTERED

## 2024-08-13 PROCEDURE — 250N000009 HC RX 250: Performed by: INTERNAL MEDICINE

## 2024-08-13 PROCEDURE — 250N000009 HC RX 250: Performed by: NURSE ANESTHETIST, CERTIFIED REGISTERED

## 2024-08-13 PROCEDURE — 83690 ASSAY OF LIPASE: CPT | Performed by: INTERNAL MEDICINE

## 2024-08-13 PROCEDURE — 999N000181 XR SURGERY CARM FLUORO GREATER THAN 5 MIN W STILLS: Mod: TC

## 2024-08-13 PROCEDURE — 250N000009 HC RX 250: Performed by: ANESTHESIOLOGY

## 2024-08-13 PROCEDURE — 250N000011 HC RX IP 250 OP 636: Performed by: INTERNAL MEDICINE

## 2024-08-13 PROCEDURE — 36415 COLL VENOUS BLD VENIPUNCTURE: CPT | Performed by: INTERNAL MEDICINE

## 2024-08-13 PROCEDURE — 85610 PROTHROMBIN TIME: CPT | Performed by: INTERNAL MEDICINE

## 2024-08-13 PROCEDURE — 258N000003 HC RX IP 258 OP 636: Performed by: NURSE ANESTHETIST, CERTIFIED REGISTERED

## 2024-08-13 PROCEDURE — C1726 CATH, BAL DIL, NON-VASCULAR: HCPCS | Performed by: INTERNAL MEDICINE

## 2024-08-13 PROCEDURE — C2617 STENT, NON-COR, TEM W/O DEL: HCPCS | Performed by: INTERNAL MEDICINE

## 2024-08-13 PROCEDURE — 85027 COMPLETE CBC AUTOMATED: CPT | Performed by: INTERNAL MEDICINE

## 2024-08-13 PROCEDURE — 250N000011 HC RX IP 250 OP 636: Performed by: NURSE ANESTHETIST, CERTIFIED REGISTERED

## 2024-08-13 PROCEDURE — 255N000002 HC RX 255 OP 636: Performed by: INTERNAL MEDICINE

## 2024-08-13 PROCEDURE — 710N000012 HC RECOVERY PHASE 2, PER MINUTE: Performed by: INTERNAL MEDICINE

## 2024-08-13 PROCEDURE — 82374 ASSAY BLOOD CARBON DIOXIDE: CPT | Performed by: INTERNAL MEDICINE

## 2024-08-13 PROCEDURE — 272N000001 HC OR GENERAL SUPPLY STERILE: Performed by: INTERNAL MEDICINE

## 2024-08-13 PROCEDURE — 370N000017 HC ANESTHESIA TECHNICAL FEE, PER MIN: Performed by: INTERNAL MEDICINE

## 2024-08-13 PROCEDURE — 250N000025 HC SEVOFLURANE, PER MIN: Performed by: INTERNAL MEDICINE

## 2024-08-13 PROCEDURE — 710N000010 HC RECOVERY PHASE 1, LEVEL 2, PER MIN: Performed by: INTERNAL MEDICINE

## 2024-08-13 DEVICE — IMPLANTABLE DEVICE
Type: IMPLANTABLE DEVICE | Site: BILE DUCT | Status: NON-FUNCTIONAL
Removed: 2024-10-22

## 2024-08-13 DEVICE — STENT GEENEN PANCREA 5FRX3CM G49663 GPSO-SF-5-3
Type: IMPLANTABLE DEVICE | Site: PANCREATIC DUCT | Status: NON-FUNCTIONAL
Removed: 2024-10-22

## 2024-08-13 RX ORDER — FLUMAZENIL 0.1 MG/ML
0.2 INJECTION, SOLUTION INTRAVENOUS
Status: DISCONTINUED | OUTPATIENT
Start: 2024-08-13 | End: 2024-08-13 | Stop reason: HOSPADM

## 2024-08-13 RX ORDER — HYDROMORPHONE HCL IN WATER/PF 6 MG/30 ML
0.2 PATIENT CONTROLLED ANALGESIA SYRINGE INTRAVENOUS EVERY 5 MIN PRN
Status: DISCONTINUED | OUTPATIENT
Start: 2024-08-13 | End: 2024-08-13 | Stop reason: HOSPADM

## 2024-08-13 RX ORDER — ONDANSETRON 4 MG/1
4 TABLET, ORALLY DISINTEGRATING ORAL EVERY 30 MIN PRN
Status: DISCONTINUED | OUTPATIENT
Start: 2024-08-13 | End: 2024-08-13 | Stop reason: HOSPADM

## 2024-08-13 RX ORDER — PROPOFOL 10 MG/ML
INJECTION, EMULSION INTRAVENOUS PRN
Status: DISCONTINUED | OUTPATIENT
Start: 2024-08-13 | End: 2024-08-13

## 2024-08-13 RX ORDER — SODIUM CHLORIDE, SODIUM LACTATE, POTASSIUM CHLORIDE, CALCIUM CHLORIDE 600; 310; 30; 20 MG/100ML; MG/100ML; MG/100ML; MG/100ML
INJECTION, SOLUTION INTRAVENOUS CONTINUOUS PRN
Status: DISCONTINUED | OUTPATIENT
Start: 2024-08-13 | End: 2024-08-13

## 2024-08-13 RX ORDER — LEVOFLOXACIN 5 MG/ML
INJECTION, SOLUTION INTRAVENOUS PRN
Status: DISCONTINUED | OUTPATIENT
Start: 2024-08-13 | End: 2024-08-13

## 2024-08-13 RX ORDER — NALOXONE HYDROCHLORIDE 0.4 MG/ML
0.2 INJECTION, SOLUTION INTRAMUSCULAR; INTRAVENOUS; SUBCUTANEOUS
Status: DISCONTINUED | OUTPATIENT
Start: 2024-08-13 | End: 2024-08-13 | Stop reason: HOSPADM

## 2024-08-13 RX ORDER — DEXAMETHASONE SODIUM PHOSPHATE 4 MG/ML
4 INJECTION, SOLUTION INTRA-ARTICULAR; INTRALESIONAL; INTRAMUSCULAR; INTRAVENOUS; SOFT TISSUE
Status: DISCONTINUED | OUTPATIENT
Start: 2024-08-13 | End: 2024-08-13 | Stop reason: HOSPADM

## 2024-08-13 RX ORDER — IOPAMIDOL 510 MG/ML
INJECTION, SOLUTION INTRAVASCULAR PRN
Status: DISCONTINUED | OUTPATIENT
Start: 2024-08-13 | End: 2024-08-13 | Stop reason: HOSPADM

## 2024-08-13 RX ORDER — EPINEPHRINE 1 MG/ML
INJECTION, SOLUTION, CONCENTRATE INTRAVENOUS PRN
Status: DISCONTINUED | OUTPATIENT
Start: 2024-08-13 | End: 2024-08-13 | Stop reason: HOSPADM

## 2024-08-13 RX ORDER — NALOXONE HYDROCHLORIDE 0.4 MG/ML
0.4 INJECTION, SOLUTION INTRAMUSCULAR; INTRAVENOUS; SUBCUTANEOUS
Status: DISCONTINUED | OUTPATIENT
Start: 2024-08-13 | End: 2024-08-13 | Stop reason: HOSPADM

## 2024-08-13 RX ORDER — DEXAMETHASONE SODIUM PHOSPHATE 4 MG/ML
INJECTION, SOLUTION INTRA-ARTICULAR; INTRALESIONAL; INTRAMUSCULAR; INTRAVENOUS; SOFT TISSUE PRN
Status: DISCONTINUED | OUTPATIENT
Start: 2024-08-13 | End: 2024-08-13

## 2024-08-13 RX ORDER — LIDOCAINE HYDROCHLORIDE 20 MG/ML
INJECTION, SOLUTION INFILTRATION; PERINEURAL PRN
Status: DISCONTINUED | OUTPATIENT
Start: 2024-08-13 | End: 2024-08-13

## 2024-08-13 RX ORDER — ONDANSETRON 2 MG/ML
4 INJECTION INTRAMUSCULAR; INTRAVENOUS EVERY 30 MIN PRN
Status: DISCONTINUED | OUTPATIENT
Start: 2024-08-13 | End: 2024-08-13 | Stop reason: HOSPADM

## 2024-08-13 RX ORDER — FENTANYL CITRATE 50 UG/ML
INJECTION, SOLUTION INTRAMUSCULAR; INTRAVENOUS PRN
Status: DISCONTINUED | OUTPATIENT
Start: 2024-08-13 | End: 2024-08-13

## 2024-08-13 RX ORDER — CYCLOSPORINE 100 MG/1
200 CAPSULE, LIQUID FILLED ORAL EVERY 12 HOURS
Qty: 120 CAPSULE | Refills: 11 | Status: SHIPPED | OUTPATIENT
Start: 2024-08-13 | End: 2024-09-04

## 2024-08-13 RX ORDER — NALOXONE HYDROCHLORIDE 0.4 MG/ML
0.1 INJECTION, SOLUTION INTRAMUSCULAR; INTRAVENOUS; SUBCUTANEOUS
Status: DISCONTINUED | OUTPATIENT
Start: 2024-08-13 | End: 2024-08-13 | Stop reason: HOSPADM

## 2024-08-13 RX ORDER — HYDROMORPHONE HCL IN WATER/PF 6 MG/30 ML
0.4 PATIENT CONTROLLED ANALGESIA SYRINGE INTRAVENOUS EVERY 5 MIN PRN
Status: DISCONTINUED | OUTPATIENT
Start: 2024-08-13 | End: 2024-08-13 | Stop reason: HOSPADM

## 2024-08-13 RX ORDER — SODIUM CHLORIDE, SODIUM LACTATE, POTASSIUM CHLORIDE, CALCIUM CHLORIDE 600; 310; 30; 20 MG/100ML; MG/100ML; MG/100ML; MG/100ML
INJECTION, SOLUTION INTRAVENOUS CONTINUOUS
Status: DISCONTINUED | OUTPATIENT
Start: 2024-08-13 | End: 2024-08-13 | Stop reason: HOSPADM

## 2024-08-13 RX ORDER — FENTANYL CITRATE 50 UG/ML
25 INJECTION, SOLUTION INTRAMUSCULAR; INTRAVENOUS EVERY 5 MIN PRN
Status: DISCONTINUED | OUTPATIENT
Start: 2024-08-13 | End: 2024-08-13 | Stop reason: HOSPADM

## 2024-08-13 RX ORDER — ONDANSETRON 2 MG/ML
INJECTION INTRAMUSCULAR; INTRAVENOUS PRN
Status: DISCONTINUED | OUTPATIENT
Start: 2024-08-13 | End: 2024-08-13

## 2024-08-13 RX ORDER — ONDANSETRON 2 MG/ML
4 INJECTION INTRAMUSCULAR; INTRAVENOUS EVERY 6 HOURS PRN
Status: DISCONTINUED | OUTPATIENT
Start: 2024-08-13 | End: 2024-08-13 | Stop reason: HOSPADM

## 2024-08-13 RX ORDER — FENTANYL CITRATE 50 UG/ML
50 INJECTION, SOLUTION INTRAMUSCULAR; INTRAVENOUS EVERY 5 MIN PRN
Status: DISCONTINUED | OUTPATIENT
Start: 2024-08-13 | End: 2024-08-13 | Stop reason: HOSPADM

## 2024-08-13 RX ORDER — CYCLOSPORINE 25 MG/1
50 CAPSULE, LIQUID FILLED ORAL EVERY 12 HOURS
Qty: 360 CAPSULE | Refills: 3 | Status: SHIPPED | OUTPATIENT
Start: 2024-08-13 | End: 2024-09-04

## 2024-08-13 RX ORDER — ONDANSETRON 4 MG/1
4 TABLET, ORALLY DISINTEGRATING ORAL EVERY 6 HOURS PRN
Status: DISCONTINUED | OUTPATIENT
Start: 2024-08-13 | End: 2024-08-13 | Stop reason: HOSPADM

## 2024-08-13 RX ORDER — INDOMETHACIN 50 MG/1
100 SUPPOSITORY RECTAL
Status: DISCONTINUED | OUTPATIENT
Start: 2024-08-13 | End: 2024-08-13 | Stop reason: HOSPADM

## 2024-08-13 RX ORDER — OXYCODONE HYDROCHLORIDE 5 MG/1
5 TABLET ORAL
Status: DISCONTINUED | OUTPATIENT
Start: 2024-08-13 | End: 2024-08-13 | Stop reason: HOSPADM

## 2024-08-13 RX ORDER — LIDOCAINE 40 MG/G
CREAM TOPICAL
Status: DISCONTINUED | OUTPATIENT
Start: 2024-08-13 | End: 2024-08-13 | Stop reason: HOSPADM

## 2024-08-13 RX ORDER — OXYCODONE HYDROCHLORIDE 10 MG/1
10 TABLET ORAL
Status: DISCONTINUED | OUTPATIENT
Start: 2024-08-13 | End: 2024-08-13 | Stop reason: HOSPADM

## 2024-08-13 RX ADMIN — PHENYLEPHRINE HYDROCHLORIDE 100 MCG: 10 INJECTION INTRAVENOUS at 10:27

## 2024-08-13 RX ADMIN — PHENYLEPHRINE HYDROCHLORIDE 100 MCG: 10 INJECTION INTRAVENOUS at 11:06

## 2024-08-13 RX ADMIN — PHENYLEPHRINE HYDROCHLORIDE 100 MCG: 10 INJECTION INTRAVENOUS at 10:12

## 2024-08-13 RX ADMIN — GLUCAGON 0.4 MG: KIT at 10:37

## 2024-08-13 RX ADMIN — PHENYLEPHRINE HYDROCHLORIDE 100 MCG: 10 INJECTION INTRAVENOUS at 10:00

## 2024-08-13 RX ADMIN — DEXAMETHASONE SODIUM PHOSPHATE 4 MG: 4 INJECTION, SOLUTION INTRA-ARTICULAR; INTRALESIONAL; INTRAMUSCULAR; INTRAVENOUS; SOFT TISSUE at 09:47

## 2024-08-13 RX ADMIN — ONDANSETRON 4 MG: 2 INJECTION INTRAMUSCULAR; INTRAVENOUS at 11:19

## 2024-08-13 RX ADMIN — MIDAZOLAM 2 MG: 1 INJECTION INTRAMUSCULAR; INTRAVENOUS at 09:35

## 2024-08-13 RX ADMIN — LEVOFLOXACIN 500 MG: 5 INJECTION, SOLUTION INTRAVENOUS at 10:00

## 2024-08-13 RX ADMIN — PHENYLEPHRINE HYDROCHLORIDE 100 MCG: 10 INJECTION INTRAVENOUS at 10:52

## 2024-08-13 RX ADMIN — ONDANSETRON 4 MG: 2 INJECTION INTRAMUSCULAR; INTRAVENOUS at 09:47

## 2024-08-13 RX ADMIN — FENTANYL CITRATE 100 MCG: 50 INJECTION INTRAMUSCULAR; INTRAVENOUS at 09:47

## 2024-08-13 RX ADMIN — SUGAMMADEX 200 MG: 100 INJECTION, SOLUTION INTRAVENOUS at 11:30

## 2024-08-13 RX ADMIN — Medication 50 MG: at 09:47

## 2024-08-13 RX ADMIN — SODIUM CHLORIDE, POTASSIUM CHLORIDE, SODIUM LACTATE AND CALCIUM CHLORIDE: 600; 310; 30; 20 INJECTION, SOLUTION INTRAVENOUS at 09:34

## 2024-08-13 RX ADMIN — PROPOFOL 160 MG: 10 INJECTION, EMULSION INTRAVENOUS at 09:47

## 2024-08-13 RX ADMIN — LIDOCAINE HYDROCHLORIDE 100 MG: 20 INJECTION, SOLUTION INFILTRATION; PERINEURAL at 09:47

## 2024-08-13 ASSESSMENT — LIFESTYLE VARIABLES: TOBACCO_USE: 1

## 2024-08-13 ASSESSMENT — ACTIVITIES OF DAILY LIVING (ADL)
ADLS_ACUITY_SCORE: 31
ADLS_ACUITY_SCORE: 31
ADLS_ACUITY_SCORE: 29
ADLS_ACUITY_SCORE: 31

## 2024-08-13 NOTE — OR NURSING
Blood sugar 265 in Phase II. Dr. Lopez notified, no interventions needed. Patient is okay to discharge home and treat with home regimen. Dr. Lopez states that she will place sign out when able.

## 2024-08-13 NOTE — TELEPHONE ENCOUNTER
ISSUE:   Cyclosporine level 246 on 8/12, goal 150-200, dose 275 mg BID.    PLAN:   Call Patient and confirm this was an accurate 12-hour trough.   Verify Cyclosporine dose..   Confirm no new medications or or missed doses.   Confirm no new illness / infection / diarrhea.   If accurate trough and accurate dose, decrease Cyclosporine dose to 250 mg BID     Repeat labs in a month.  Lore Mckeon, RN     OUTCOME:   Spoke with [facility], they confirm accurate trough level and current dose 275 mg BID.   [facility] confirmed dose change to 250 mg BID.  [facility] agreed to repeat labs in 1 weeks.   Orders sent to preferred pharmacy for dose change and lab for repeat labs.   [facility] voiced understanding of plan.     Valentina Enamorado LPN

## 2024-08-13 NOTE — ANESTHESIA PROCEDURE NOTES
Airway       Patient location during procedure: OR       Procedure Start/Stop Times: 8/13/2024 9:51 AM  Staff -        CRNA: Nithin Jones APRN CRNA       Performed By: CRNAIndications and Patient Condition       Indications for airway management: doreen-procedural       Induction type:intravenous       Mask difficulty assessment: 1 - vent by mask    Final Airway Details       Final airway type: endotracheal airway       Successful airway: ETT - single  Endotracheal Airway Details        ETT size (mm): 8.0       Cuffed: yes       Cuff volume (mL): 8       Successful intubation technique: direct laryngoscopy       DL Blade Type: MAC 4       Grade View of Cords: 1       Adjucts: stylet       Position: Right       Measured from: gums/teeth       Secured at (cm): 20       Bite Block used: GI bite block.    Post intubation assessment        Placement verified by: capnometry, equal breath sounds and chest rise        Number of attempts at approach: 1       Number of other approaches attempted: 0       Secured with: tape       Ease of procedure: easy       Dentition: Unchanged and Intact    Medication(s) Administered   Medication Administration Time: 8/13/2024 9:51 AM

## 2024-08-13 NOTE — ANESTHESIA PREPROCEDURE EVALUATION
Anesthesia Pre-Procedure Evaluation    Patient: Mary Lopez   MRN: [id number] : 1970        Procedure : Procedure(s):  ENDOSCOPIC RETROGRADE CHOLANGIOPANCREATOGRAPHY          Past Medical History:   Diagnosis Date    ANGEL (acute kidney injury) (H24)     Alcoholic cirrhosis of liver without ascites (H) 2023    Alcoholic hepatitis with ascites (H28) 10/03/2023    Alcoholic hepatitis without ascites (H28) 2023    CKD stage 3a, GFR 45-59 ml/min (H) 2024    Closed fracture of one rib of left side 2023    Concussion without loss of consciousness 2020    Decompensated hepatic cirrhosis (H) 09/15/2023    Diabetes mellitus, type 2 (H) 2023    Essential hypertension 2020    Ganglion cyst of wrist, right 2024    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)     Liver replaced by transplant (H) 2024    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    ACR PRAJAPATI 6-7, treated with steroids    Mild hyperlipidemia 2021    Persistent insomnia 2023    Portal hypertension (H) 2023    Right inguinal hernia 2024    Scrotal abscess     Secondary esophageal 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 (H) 2023    Tobacco abuse disorder 2020    Type 2 diabetes mellitus with hyperglycemia (H) 2023      Past Surgical History:   Procedure Laterality Date    BENCH LIVER  3/5/2024    Procedure: Bench liver;  Surgeon: Ryder Cortez MD;  Location: [facility]    CHOLECYSTECTOMY      COLONOSCOPY N/A 2024    Procedure: COLONOSCOPY, WITH POLYPECTOMY;  Surgeon: Jak Urbina MD;  Location: [facility]    CV RIGHT HEART CATH MEASUREMENTS RECORDED N/A 2024    Procedure: Right Heart Catheterization;  Surgeon: Alfred Tafoya MD;  Location:  HEART CARDIAC CATH LAB    ENTEROSCOPY SMALL BOWEL N/A 2024    Procedure: Enteroscopy small bowel;  Surgeon: Hugo Daigle MD;  Location:  GI    ESOPHAGOSCOPY, GASTROSCOPY, DUODENOSCOPY  (EGD), COMBINED N/A 2024    Procedure: Esophagoscopy, gastroscopy, duodenoscopy (EGD), combined;  Surgeon: Hugo Daigle MD;  Location: [facility]    IR LIVER BIOPSY PERCUTANEOUS  3/15/2024    IR RETROPERITONEAL ABSCESS DRAINAGE  2024    TONSILLECTOMY      TRANSPLANT LIVER RECIPIENT  DONOR N/A 3/5/2024    Procedure: Transplant liver recipient  donor, bile duct stent placement;  Surgeon: Ryder Cortez MD;  Location: [facility] OR    VASECTOMY        Allergies   Allergen Reactions    Other Food Allergy Anaphylaxis     Legumes (black beans, baked beans, chickpeas)    Pineapple Itching      Social History     Tobacco Use    Smoking status: Former     Types: Cigarettes     Passive exposure: Never    Smokeless tobacco: Current     Types: Chew     Last attempt to quit: 2004    Tobacco comments:     Chew daily 1/8 of a tin per day   Substance Use Topics    Alcohol use: Not Currently     Alcohol/week: 12.0 standard drinks of alcohol     Types: 12 Standard drinks or equivalent per week     Comment: Sober since 2023      Wt Readings from Last 1 Encounters:   24 87.5 kg (192 lb 14.4 oz)        Anesthesia Evaluation   Pt has had prior anesthetic. Type: MAC and General.    No history of anesthetic complications       ROS/MED HX  ENT/Pulmonary:     (+)                tobacco use, Past use,                       Neurologic:       Cardiovascular:     (+) Dyslipidemia hypertension- -   -  - -                                 Previous cardiac testing   Echo: Date: 2023 Results:  [facility]  Diagnostic and Treatment-3rd Floor  [address] [place] [id number]     Name: LAIRENETTA SHIPMAN BLANCA  MRN: [id number]  : 1970  Study Date: 2023 12:28 PM  Age: 53 yrs  Gender: Male  Patient Location: [facility]  Reason For Study: Alcoholic cirrhosis of liver with ascites (H), Portal  hypertensi  Ordering Physician: ELENA MONTEZ  JOSTIN  Referring Physician: JOSTIN ABRAHAM  Performed By: Becki Ba     BSA: 2.2 m2  Height: 69 in  Weight: 230 lb  HR: 94  BP: 118/68 mmHg  ______________________________________________________________________________  Procedure  Echocardiogram with two-dimensional, color and spectral Doppler performed.  ______________________________________________________________________________  Interpretation Summary  Global and regional left ventricular function is normal with an EF of 60-65%.  Global right ventricular function is normal.  Mild tricuspid and mitral insufficiency present.  The right ventricular systolic pressure is approximated at 37 mmHg (upper  limits of normal).  Estimated mean right atrial pressure is normal.  Trivial pericardial effusion is present.  Ascites is noted.  ______________________________________________________________________________  Left Ventricle  Left ventricular size is normal. Left ventricular wall thickness is normal.  Global and regional left ventricular function is normal with an EF of 60-65%.  Left ventricular diastolic function is indeterminate. No regional wall motion  abnormalities are seen.     Right Ventricle  The right ventricle is normal size. Global right ventricular function is  normal.     Atria  The right atria appears normal. Mild left atrial enlargement is present. The  atrial septum is intact as assessed by color Doppler .     Mitral Valve  Mild mitral insufficiency is present.     Aortic Valve  Aortic valve is normal in structure and function.     Tricuspid Valve  Mild tricuspid insufficiency is present. The right ventricular systolic  pressure is approximated at 33.6 mmHg plus the right atrial pressure.     Pulmonic Valve  The pulmonic valve is normal.     Vessels  The aorta root is normal. The inferior vena cava is normal. Estimated mean  right atrial pressure is normal.     Pericardium  Trivial pericardial effusion is present.      Miscellaneous  Ascites is noted.     Compared to Previous Study  Previous study not available for comparison.  ______________________________________________________________________________  MMode/2D Measurements & Calculations  IVSd: 0.93 cm  LVIDd: 5.8 cm  LVIDs: 3.9 cm  LVPWd: 0.84 cm  FS: 32.6 %  LV mass(C)d: 199.3 grams  LV mass(C)dI: 90.9 grams/m2  Ao root diam: 3.7 cm  LA dimension: 4.0 cm  asc Aorta Diam: 3.5 cm  LA/Ao: 1.1  LVOT diam: 2.4 cm  LVOT area: 4.7 cm2  Ao root diam index Ht(cm/m): 2.1  Ao root diam index BSA (cm/m2): 1.7  Asc Ao diam index BSA (cm/m2): 1.6  Asc Ao diam index Ht(cm/m): 2.0  LA Volume (BP): 86.2 ml     LA Volume Index (BP): 39.4 ml/m2  RV Base: 4.5 cm  RWT: 0.29  TAPSE: 2.5 cm     Doppler Measurements & Calculations  MV E max kristopher: 92.2 cm/sec  MV A max kristopher: 81.9 cm/sec  MV E/A: 1.1  MV max P.9 mmHg  MV mean P.3 mmHg  MV V2 VTI: 25.2 cm  MVA(VTI): 5.1 cm2  MV dec time: 0.16 sec  Ao V2 max: 173.7 cm/sec  Ao max P.1 mmHg  Ao V2 mean: 121.0 cm/sec  Ao mean P.7 mmHg  Ao V2 VTI: 35.2 cm  DRAKE(I,D): 3.6 cm2  DRAKE(V,D): 4.4 cm2  LV V1 max PG: 10.3 mmHg  LV V1 max: 160.6 cm/sec  LV V1 VTI: 27.0 cm  SV(LVOT): 127.4 ml  SI(LVOT): 58.1 ml/m2     PA V2 max: 109.7 cm/sec  PA max P.9 mmHg  PA acc time: 0.09 sec  TR max kristopher: 289.9 cm/sec  TR max P.6 mmHg  AV Kristopher Ratio (DI): 0.92  DRAKE Index (cm2/m2): 1.7  E/E' av.4  Lateral E/e': 7.1  Medial E/e': 7.7  RV S Kristopher: 22.3 cm/sec     ______________________________________________________________________________  Report approved by: Rosendo Thorpe 2023 01:11 PM    Stress Test:  Date: Results:    ECG Reviewed:  Date: Results:    Cath:  Date: Results:      METS/Exercise Tolerance:     Hematologic: Comments: Thrombocytopenia    (+)      anemia,          Musculoskeletal:       GI/Hepatic: Comment: Pt had a liver transplant 2024    (+)   esophageal disease, Varices,       hepatitis type Alcoholic, liver disease,      "  Renal/Genitourinary: Comment: acute kidney injury hx    (+) renal disease, type: ARF,            Endo: Comment: BMI: 31.22    (+)  type II DM, Last HgA1c: 7.7, date: 12/19/2023, Using insulin,          Obesity,       Psychiatric/Substance Use: Comment: Alcohol use disorder, severe, in early remission        (+)   alcohol abuse (sober from alcohol since June 25, 2023)      Infectious Disease:       Malignancy:       Other:            Physical Exam    Airway        Mallampati: II   TM distance: > 3 FB   Neck ROM: full   Mouth opening: > 3 cm    Respiratory Devices and Support         Dental     Comment: wnl        Cardiovascular          Rhythm and rate: regular and normal     Pulmonary           breath sounds clear to auscultation           OUTSIDE LABS:  CBC:   Lab Results   Component Value Date    WBC 4.0 08/13/2024    WBC 4.6 08/12/2024    HGB 10.4 (L) 08/13/2024    HGB 10.3 (L) 08/12/2024    HCT 32.3 (L) 08/13/2024    HCT 30.6 (L) 08/12/2024     08/13/2024     08/12/2024     BMP:   Lab Results   Component Value Date     08/12/2024     08/06/2024    POTASSIUM 4.9 08/12/2024    POTASSIUM 5.2 08/08/2024    CHLORIDE 106 08/12/2024    CHLORIDE 106 08/06/2024    CO2 24 08/12/2024    CO2 25 08/06/2024    BUN 30.1 (H) 08/12/2024    BUN 39.2 (H) 08/06/2024    CR 1.33 (H) 08/12/2024    CR 1.49 (H) 08/06/2024     (H) 08/13/2024     (H) 08/12/2024     COAGS:   Lab Results   Component Value Date    PTT 33 03/31/2024    INR 1.12 08/13/2024    FIBR 310 03/31/2024     POC: No results found for: \"BGM\", \"HCG\", \"HCGS\"  HEPATIC:   Lab Results   Component Value Date    ALBUMIN 3.8 08/12/2024    PROTTOTAL 6.2 (L) 08/12/2024    ALT 41 08/12/2024    AST 30 08/12/2024    ALKPHOS 167 (H) 08/12/2024    BILITOTAL 0.6 08/12/2024    JAQUELINE 27 03/09/2024     OTHER:   Lab Results   Component Value Date    PH 7.45 03/07/2024    LACT 0.8 03/30/2024    A1C 7.3 (H) 06/21/2024    MEG 9.3 08/12/2024    PHOS " "3.9 08/12/2024    MAG 1.9 08/12/2024    LIPASE 5 (L) 03/30/2024    AMYLASE 22 (L) 03/06/2024    TSH 2.18 03/12/2022       Anesthesia Plan    ASA Status:  3    NPO Status:  NPO Appropriate    Anesthesia Type: General.     - Airway: ETT   Induction: Intravenous.   Maintenance: Balanced.        Consents    Anesthesia Plan(s) and associated risks, benefits, and realistic alternatives discussed. Questions answered and patient/representative(s) expressed understanding.     - Discussed:     - Discussed with:  Patient, Spouse      - Extended Intubation/Ventilatory Support Discussed: No.      - Patient is DNR/DNI Status: No     Use of blood products discussed: No .     Postoperative Care    Pain management: IV analgesics.   PONV prophylaxis: Ondansetron (or other 5HT-3), Dexamethasone or Solumedrol     Comments:    Other Comments: NPO. Meds reviewed. No problems with anesthesia in past. No recent URI. No CP or SOB. Doing well since liver transplant.           Suzanne Lopez MD    I have reviewed the pertinent notes and labs in the chart from the past 30 days and (re)examined the patient.  Any updates or changes from those notes are reflected in this note.    # Hyperkalemia: Highest K = 5.9 mmol/L in last 30 days, will monitor as appropriate     # Hypomagnesemia: Lowest Mg = 1.6 mg/dL in last 30 days, will replace as needed      # Drug Induced Platelet Defect: home medication list includes an antiplatelet medication  # DMII: A1C = 7.3 % (Ref range: 0.0 - 5.6 %) within past 6 months  # Overweight: Estimated body mass index is 29.33 kg/m  as calculated from the following:    Height as of this encounter: 1.727 m (5' 8\").    Weight as of this encounter: 87.5 kg (192 lb 14.4 oz).      "

## 2024-08-13 NOTE — PROGRESS NOTES
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 Post-op course was complicated by early acute cellular rejection.     Ref: Hugo Daigle MD

## 2024-08-13 NOTE — ANESTHESIA CARE TRANSFER NOTE
Patient: Mary Lopez    Procedure: Procedure(s):  Endoscopic Retrograde Cholangiopancreatography with biliary sphincterotomy, balloon dilation, pancreatic stent placement,biliary stent exchange       Diagnosis: Encounter for removal of biliary stent [Z46.89]  Diagnosis Additional Information: No value filed.    Anesthesia Type:   General     Note:    Oropharynx: oropharynx clear of all foreign objects and spontaneously breathing  Level of Consciousness: awake  Oxygen Supplementation: face mask  Level of Supplemental Oxygen (L/min / FiO2): 6  Independent Airway: airway patency satisfactory and stable  Dentition: dentition unchanged  Vital Signs Stable: post-procedure vital signs reviewed and stable  Report to RN Given: handoff report given  Patient transferred to: PACU    Handoff Report: Identifed the Patient, Identified the Reponsible Provider, Reviewed the pertinent medical history, Discussed the surgical course, Reviewed Intra-OP anesthesia mangement and issues during anesthesia, Set expectations for post-procedure period and Allowed opportunity for questions and acknowledgement of understanding      Vitals:  Vitals Value Taken Time   /80 08/13/24 1139   Temp     Pulse 71 08/13/24 1142   Resp 11 08/13/24 1142   SpO2 100 % 08/13/24 1142   Vitals shown include unfiled device data.    Electronically Signed By: LESA Pompa CRNA  August 13, 2024  11:42 AM

## 2024-08-13 NOTE — BRIEF OP NOTE
[facility]    Brief Operative Note    Pre-operative diagnosis: Encounter for removal of biliary stent [Z46.89]  Post-operative diagnosis Same as pre-operative diagnosis    Procedure: Endoscopic Retrograde Cholangiopancreatography with biliary sphincterotomy, balloon dilation, pancreatic stent placement,biliary stent exchange, N/A - Mouth    Surgeon: Surgeons and Role:     * Naldo Rodriguez MD - Primary     * Tori Mcnulty MD  Anesthesia: General   Estimated Blood Loss: None    Drains: None  Specimens: * No specimens in log *    Findings:     - Biliary sphincterotomy (using needle knife) with removal of biliary stent   - Balloon dilation of mild anastomotic stricture   - Placement of both biliary (10 Fr x 12 cm Cotton-Mandujano) and pancreatic (5 Fr x 3 cm Geenen) plastic stents.      Recommendations:  - Monitor patient in GI recovery   - Clear liquid diet and advance diet as tolerated   - Repeat ERCP in 8 weeks for stent removal/exchange   - Patient should be on clear liquid diet for 1 day prior to the procedure     Complications: None.  Implants:   Implant Name Type Inv. Item Serial No.  Lot No. LRB No. Used Action   STENT GEENEN PANCREA [id number]-SF-[id number] - [id number] Stent STENT GEENEN PANCREA [id number] GPSO-SF-5-3  [place] [employer] [id number] N/A 1 Implanted   STENT URETERAL DBL PIGTAIL INLAY [id number] - [id number] Stent STENT URETERAL DBL PIGTAIL INLAY [id number]  [employer] [id number] N/A 1 Explanted   STENT COTTON-MANDUJANO ([place]) NORMA SOF-FLEX [id number] - [id number] Stent STENT COTTON-MANDUJANO ([place]) NORMA SOF-FLEX [id number]  [employer] [id number] N/A 1 Implanted

## 2024-08-13 NOTE — DISCHARGE INSTRUCTIONS
- Clear liquid diet and advance diet as tolerated   - Repeat ERCP in 8 weeks for stent removal/exchange   - Patient should be on clear liquid diet for 1 day prior to the procedure    Contacting your Doctor -   To contact a doctor, call Dr Rodriguez's clinic at [phone] or:  [phone] and ask for the resident on call for Gastroenterology (answered 24 hours a day)   Emergency Department:  [place]: [phone]  [place]: [phone] 911 if you are in need of immediate or emergent help

## 2024-08-13 NOTE — ANESTHESIA POSTPROCEDURE EVALUATION
Patient: Mary Lopez    Procedure: Procedure(s):  Endoscopic Retrograde Cholangiopancreatography with biliary sphincterotomy, balloon dilation, pancreatic stent placement,biliary stent exchange       Anesthesia Type:  General    Note:  Disposition: Outpatient   Postop Pain Control: Uneventful            Sign Out: Well controlled pain   PONV: No   Neuro/Psych: Uneventful            Sign Out: Acceptable/Baseline neuro status   Airway/Respiratory: Uneventful            Sign Out: Acceptable/Baseline resp. status   CV/Hemodynamics: Uneventful            Sign Out: Acceptable CV status; No obvious hypovolemia; No obvious fluid overload   Other NRE: NONE   DID A NON-ROUTINE EVENT OCCUR? No           Last vitals:  Vitals Value Taken Time   /91 08/13/24 1300   Temp 36.1  C (96.9  F) 08/13/24 1225   Pulse 64 08/13/24 1300   Resp 18 08/13/24 1300   SpO2 97 % 08/13/24 1300       Electronically Signed By: Suzanne Lopez MD  August 13, 2024  1:40 PM

## 2024-08-13 NOTE — OR NURSING
Blood sugar is 333, Dr. John Palacios is aware, patient has a dexcom with insulin, she said when the patient is in phase 2, she will come and treat the Blood sugar with result from the dexcom.  She will also write the sign out.

## 2024-08-15 ENCOUNTER — PATIENT OUTREACH (OUTPATIENT)
Dept: CARE COORDINATION | Facility: CLINIC | Age: 54
End: 2024-08-15
Payer: COMMERCIAL

## 2024-08-16 ENCOUNTER — TELEPHONE (OUTPATIENT)
Dept: GASTROENTEROLOGY | Facility: CLINIC | Age: 54
End: 2024-08-16
Payer: COMMERCIAL

## 2024-08-16 DIAGNOSIS — Z46.89 ENCOUNTER FOR REMOVAL OF BILIARY STENT: Primary | ICD-10-CM

## 2024-08-16 NOTE — TELEPHONE ENCOUNTER
Per Dr. Rodriguez ERCP note 8-13-24  Abdominal Xray in 3-4 weeks to assess for pancreatic  stent passage, biliary stent position  - If pancreatic stent has passed, repeat ERCP in 8-12     weeks  If pancreatic stent present at 3-4 weeks, repeat     ERCP in 4-6 weeks for pancreatic stent removal, assessment of stricture     Mychart sent    ML

## 2024-08-16 NOTE — TELEPHONE ENCOUNTER
[facility] Call Center    Phone Message    May a detailed message be left on voicemail: yes     Reason for Call: Order(s): Other:   Reason for requested: X-ray orders for post procedure  Date needed: ASAP  Provider name: Dr Rodriguez    Action Taken: Message routed to:  [facility] CAROL Higuera    Travel Screening: Not Applicable     Date of Service:

## 2024-08-19 ENCOUNTER — TELEPHONE (OUTPATIENT)
Dept: TRANSPLANT | Facility: CLINIC | Age: 54
End: 2024-08-19

## 2024-08-19 ENCOUNTER — LAB (OUTPATIENT)
Dept: LAB | Facility: CLINIC | Age: 54
End: 2024-08-19
Payer: COMMERCIAL

## 2024-08-19 DIAGNOSIS — D72.819 LEUKOPENIA: ICD-10-CM

## 2024-08-19 DIAGNOSIS — D72.819 LEUKOPENIA: Primary | ICD-10-CM

## 2024-08-19 DIAGNOSIS — E83.42 HYPOMAGNESEMIA: ICD-10-CM

## 2024-08-19 DIAGNOSIS — Z94.4 LIVER REPLACED BY TRANSPLANT (H): Chronic | ICD-10-CM

## 2024-08-19 LAB
ALBUMIN SERPL BCG-MCNC: 3.6 G/DL (ref 3.5–5.2)
ALP SERPL-CCNC: 213 U/L (ref 40–150)
ALT SERPL W P-5'-P-CCNC: 52 U/L (ref 0–70)
ANION GAP SERPL CALCULATED.3IONS-SCNC: 8 MMOL/L (ref 7–15)
AST SERPL W P-5'-P-CCNC: 38 U/L (ref 0–45)
BASOPHILS # BLD AUTO: 0.1 10E3/UL (ref 0–0.2)
BASOPHILS NFR BLD AUTO: 2 %
BILIRUB DIRECT SERPL-MCNC: 0.26 MG/DL (ref 0–0.3)
BILIRUB SERPL-MCNC: 0.6 MG/DL
BUN SERPL-MCNC: 22.3 MG/DL (ref 6–20)
CALCIUM SERPL-MCNC: 9.2 MG/DL (ref 8.8–10.4)
CHLORIDE SERPL-SCNC: 105 MMOL/L (ref 98–107)
CREAT SERPL-MCNC: 1.34 MG/DL (ref 0.67–1.17)
CYCLOSPORINE BLD LC/MS/MS-MCNC: 192 UG/L (ref 50–400)
EGFRCR SERPLBLD CKD-EPI 2021: 64 ML/MIN/1.73M2
EOSINOPHIL # BLD AUTO: 0.6 10E3/UL (ref 0–0.7)
EOSINOPHIL NFR BLD AUTO: 24 %
ERYTHROCYTE [DISTWIDTH] IN BLOOD BY AUTOMATED COUNT: 13.3 % (ref 10–15)
GLUCOSE SERPL-MCNC: 303 MG/DL (ref 70–99)
HCO3 SERPL-SCNC: 26 MMOL/L (ref 22–29)
HCT VFR BLD AUTO: 29.3 % (ref 40–53)
HGB BLD-MCNC: 9.8 G/DL (ref 13.3–17.7)
IMM GRANULOCYTES # BLD: 0 10E3/UL
IMM GRANULOCYTES NFR BLD: 1 %
LYMPHOCYTES # BLD AUTO: 0.9 10E3/UL (ref 0.8–5.3)
LYMPHOCYTES NFR BLD AUTO: 37 %
MAGNESIUM SERPL-MCNC: 1.9 MG/DL (ref 1.7–2.3)
MCH RBC QN AUTO: 30.4 PG (ref 26.5–33)
MCHC RBC AUTO-ENTMCNC: 33.4 G/DL (ref 31.5–36.5)
MCV RBC AUTO: 91 FL (ref 78–100)
MONOCYTES # BLD AUTO: 0.5 10E3/UL (ref 0–1.3)
MONOCYTES NFR BLD AUTO: 19 %
NEUTROPHILS # BLD AUTO: 0.4 10E3/UL (ref 1.6–8.3)
NEUTROPHILS NFR BLD AUTO: 17 %
PHOSPHATE SERPL-MCNC: 3.4 MG/DL (ref 2.5–4.5)
PLATELET # BLD AUTO: 178 10E3/UL (ref 150–450)
POTASSIUM SERPL-SCNC: 4.7 MMOL/L (ref 3.4–5.3)
PROT SERPL-MCNC: 6.1 G/DL (ref 6.4–8.3)
RBC # BLD AUTO: 3.22 10E6/UL (ref 4.4–5.9)
SODIUM SERPL-SCNC: 139 MMOL/L (ref 135–145)
TME LAST DOSE: NORMAL H
TME LAST DOSE: NORMAL H
WBC # BLD AUTO: 2.4 10E3/UL (ref 4–11)
WBC # BLD AUTO: 2.5 10E3/UL (ref 4–11)

## 2024-08-19 PROCEDURE — 85025 COMPLETE CBC W/AUTO DIFF WBC: CPT

## 2024-08-19 PROCEDURE — 80158 DRUG ASSAY CYCLOSPORINE: CPT

## 2024-08-19 PROCEDURE — 36415 COLL VENOUS BLD VENIPUNCTURE: CPT

## 2024-08-19 PROCEDURE — 84100 ASSAY OF PHOSPHORUS: CPT

## 2024-08-19 PROCEDURE — 83735 ASSAY OF MAGNESIUM: CPT

## 2024-08-19 PROCEDURE — 82248 BILIRUBIN DIRECT: CPT

## 2024-08-19 PROCEDURE — 80053 COMPREHEN METABOLIC PANEL: CPT

## 2024-08-19 RX ORDER — MAGNESIUM GLYCINATE 100 MG
100 TABLET ORAL 2 TIMES DAILY
Qty: 180 TABLET | Refills: 1 | Status: SHIPPED | OUTPATIENT
Start: 2024-08-19

## 2024-08-19 NOTE — TELEPHONE ENCOUNTER
"WBC is low, added diff to today's labs. WBC 2.4, anc 0.4.    Is on CSA so remains on mmf 360 bid.  Hgb is also down, Adina concerned aobut this.  No red or black stools.  Donor and recip neg for CMV at time of transplant. Added CMV PCR to this mornings labs. Denies fever, chills, cough, sore throat.   Spoke to Mary and Adina.  Mary does describe abdominal discomfort for the past 2-3 weeks, between belt line and umbilicus.  He is having nausea \"wondering if I'm going to keep things down between awaking in the morning and about noon.  Symptoms seem to subside after lunch.  Ludin had not changed him from Mag ox to Mag Glycinate as she was using up Mag ox - this could be causing some stomach pain, mag was 1.9 on 8/13 so asked her to drop to mag glycinate 100 bid.  He takes omeprazole daily at night.  I suggested he try taking it at night.  He had an upper endo in [place] for stent removal, and an ERCP on 8/13 which don't mention any abnormalities.  Message to Dr. Muniz for feedback, asking if he wants neupogen or any other intervention.    Also, he is noting what he feels his a \"slow and hard\" heartbeat when he is lying down in bed at night.  He feels he has to \"mindfully control\" this.  \"It doesn't feel normal.  Message to Dr. Hoyt about this. Adina is wondering if Mary should be wearing a Richa patch.       He is scheduled for hernia repair on friday  "

## 2024-08-19 NOTE — TELEPHONE ENCOUNTER
DATE:  8/19/2024     TIME OF RECEIPT FROM LAB:  10am    ORDERING PROVIDER: iris    LAB TEST:  anc    LAB VALUE:  0.4    RESULTS GIVEN WITH READ-BACK TO (PROVIDER):  Lore Mckeon    TIME LAB VALUE REPORTED TO PROVIDER:   10am

## 2024-08-19 NOTE — TELEPHONE ENCOUNTER
Instructed the Rosio the following:     Please read Dr. Muniz's message to Adina - have him go in for repeat CBC w/ diff tomorrow or Wednesday, I placed an order for the CMV PCR, couldn't add to today's labs so ask  him to be sure to have this drawn also.  Thanks!     Rosio will schedule lab.

## 2024-08-19 NOTE — TELEPHONE ENCOUNTER
Message from Dr. Muniz:    Hugo Daigle MD sent to Lore Mckeon RN; Jimmie Hoyt MD  Caller: Unspecified (Today,  9:35 AM)  I would repeat CBC with diff.  If he is still neutropenic, I would reduce MpA to 180 BID then recheck labs in a week. thanks

## 2024-08-20 ENCOUNTER — LAB (OUTPATIENT)
Dept: LAB | Facility: CLINIC | Age: 54
End: 2024-08-20
Payer: COMMERCIAL

## 2024-08-20 DIAGNOSIS — D72.819 LEUKOPENIA: ICD-10-CM

## 2024-08-20 PROCEDURE — 36415 COLL VENOUS BLD VENIPUNCTURE: CPT

## 2024-08-20 PROCEDURE — 85025 COMPLETE CBC W/AUTO DIFF WBC: CPT

## 2024-08-21 ENCOUNTER — TELEPHONE (OUTPATIENT)
Dept: TRANSPLANT | Facility: CLINIC | Age: 54
End: 2024-08-21
Payer: COMMERCIAL

## 2024-08-21 DIAGNOSIS — D70.9 NEUTROPENIA (H): Primary | ICD-10-CM

## 2024-08-21 DIAGNOSIS — R79.89 ABNORMAL LIVER FUNCTION TESTS: ICD-10-CM

## 2024-08-21 LAB
BASO STIPL BLD QL SMEAR: PRESENT
BASOPHILS # BLD AUTO: 0.1 10E3/UL (ref 0–0.2)
BASOPHILS NFR BLD AUTO: 2 %
CMV DNA SPEC NAA+PROBE-ACNC: NOT DETECTED IU/ML
EOSINOPHIL # BLD AUTO: 0.6 10E3/UL (ref 0–0.7)
EOSINOPHIL NFR BLD AUTO: 24 %
ERYTHROCYTE [DISTWIDTH] IN BLOOD BY AUTOMATED COUNT: 13.2 % (ref 10–15)
HCT VFR BLD AUTO: 32.6 % (ref 40–53)
HGB BLD-MCNC: 10.6 G/DL (ref 13.3–17.7)
IMM GRANULOCYTES # BLD: 0 10E3/UL
IMM GRANULOCYTES NFR BLD: 0 %
LYMPHOCYTES # BLD AUTO: 0.9 10E3/UL (ref 0.8–5.3)
LYMPHOCYTES NFR BLD AUTO: 35 %
MCH RBC QN AUTO: 30.1 PG (ref 26.5–33)
MCHC RBC AUTO-ENTMCNC: 32.5 G/DL (ref 31.5–36.5)
MCV RBC AUTO: 93 FL (ref 78–100)
MONOCYTES # BLD AUTO: 0.5 10E3/UL (ref 0–1.3)
MONOCYTES NFR BLD AUTO: 22 %
NEUTROPHILS # BLD AUTO: 0.4 10E3/UL (ref 1.6–8.3)
NEUTROPHILS NFR BLD AUTO: 17 %
NRBC # BLD AUTO: 0 10E3/UL
NRBC BLD AUTO-RTO: 0 /100
PLAT MORPH BLD: ABNORMAL
PLATELET # BLD AUTO: 210 10E3/UL (ref 150–450)
POLYCHROMASIA BLD QL SMEAR: SLIGHT
RBC # BLD AUTO: 3.52 10E6/UL (ref 4.4–5.9)
RBC MORPH BLD: ABNORMAL
WBC # BLD AUTO: 2.5 10E3/UL (ref 4–11)

## 2024-08-21 ASSESSMENT — ENCOUNTER SYMPTOMS: NEW SYMPTOMS OF CORONARY ARTERY DISEASE: 0

## 2024-08-21 NOTE — TELEPHONE ENCOUNTER
Call back to Adina as requested. She would like to request that the hernia be rescheduled on the 9/3 or 9/6.  Message to Shanika to see if this is possible. If WBC / anc qualifies for neupogen will ask Dr. Muniz if we can give neupogen.

## 2024-08-21 NOTE — TELEPHONE ENCOUNTER
WBC today 2.5, anc 0.4.      Message from Dr. Muniz yesterday:         Hugo Daigle MD sent to Lore Mckeon RN; Jimmie Hoyt MD  Caller: Unspecified (Today,  9:35 AM)  I would repeat CBC with diff.  If he is still neutropenic, I would reduce MpA to 180 BID then recheck labs in a week. thanks          Please call Adina, ask her to decrease myfortic to 180 mg bid.n Please also tell them that the surgery nurses to see if Diego is willing to proceed w/ Mary's surgery on Friday..     Message to Kirstie lucas and Dheeraj Cruz asking them to contact Dr. Cortez to see if he is still willing to do hernia repair this Friday.

## 2024-08-23 RX ORDER — MYCOPHENOLIC ACID 180 MG/1
180 TABLET, DELAYED RELEASE ORAL 2 TIMES DAILY
Qty: 180 TABLET | Refills: 2 | Status: SHIPPED | OUTPATIENT
Start: 2024-08-23

## 2024-08-26 ENCOUNTER — LAB (OUTPATIENT)
Dept: LAB | Facility: CLINIC | Age: 54
End: 2024-08-26
Payer: COMMERCIAL

## 2024-08-26 DIAGNOSIS — Z94.4 LIVER REPLACED BY TRANSPLANT (H): Chronic | ICD-10-CM

## 2024-08-26 LAB
ALBUMIN SERPL BCG-MCNC: 3.7 G/DL (ref 3.5–5.2)
ALP SERPL-CCNC: 204 U/L (ref 40–150)
ALT SERPL W P-5'-P-CCNC: 34 U/L (ref 0–70)
ANION GAP SERPL CALCULATED.3IONS-SCNC: 7 MMOL/L (ref 7–15)
AST SERPL W P-5'-P-CCNC: 33 U/L (ref 0–45)
BILIRUB DIRECT SERPL-MCNC: 0.26 MG/DL (ref 0–0.3)
BILIRUB SERPL-MCNC: 0.6 MG/DL
BUN SERPL-MCNC: 24.5 MG/DL (ref 6–20)
CALCIUM SERPL-MCNC: 9.2 MG/DL (ref 8.8–10.4)
CHLORIDE SERPL-SCNC: 106 MMOL/L (ref 98–107)
CREAT SERPL-MCNC: 1.55 MG/DL (ref 0.67–1.17)
CYCLOSPORINE BLD LC/MS/MS-MCNC: 153 UG/L (ref 50–400)
EGFRCR SERPLBLD CKD-EPI 2021: 54 ML/MIN/1.73M2
ERYTHROCYTE [DISTWIDTH] IN BLOOD BY AUTOMATED COUNT: 13.4 % (ref 10–15)
GLUCOSE SERPL-MCNC: 151 MG/DL (ref 70–99)
HCO3 SERPL-SCNC: 29 MMOL/L (ref 22–29)
HCT VFR BLD AUTO: 32.4 % (ref 40–53)
HGB BLD-MCNC: 10.4 G/DL (ref 13.3–17.7)
MAGNESIUM SERPL-MCNC: 2.2 MG/DL (ref 1.7–2.3)
MCH RBC QN AUTO: 30 PG (ref 26.5–33)
MCHC RBC AUTO-ENTMCNC: 32.1 G/DL (ref 31.5–36.5)
MCV RBC AUTO: 93 FL (ref 78–100)
PHOSPHATE SERPL-MCNC: 4.2 MG/DL (ref 2.5–4.5)
PLATELET # BLD AUTO: 236 10E3/UL (ref 150–450)
POTASSIUM SERPL-SCNC: 5 MMOL/L (ref 3.4–5.3)
PROT SERPL-MCNC: 6.3 G/DL (ref 6.4–8.3)
RBC # BLD AUTO: 3.47 10E6/UL (ref 4.4–5.9)
SODIUM SERPL-SCNC: 142 MMOL/L (ref 135–145)
TME LAST DOSE: NORMAL H
TME LAST DOSE: NORMAL H
WBC # BLD AUTO: 2.9 10E3/UL (ref 4–11)

## 2024-08-26 PROCEDURE — 83735 ASSAY OF MAGNESIUM: CPT

## 2024-08-26 PROCEDURE — 36415 COLL VENOUS BLD VENIPUNCTURE: CPT

## 2024-08-26 PROCEDURE — 80158 DRUG ASSAY CYCLOSPORINE: CPT

## 2024-08-26 PROCEDURE — 84100 ASSAY OF PHOSPHORUS: CPT

## 2024-08-26 PROCEDURE — 82248 BILIRUBIN DIRECT: CPT

## 2024-08-26 PROCEDURE — 85027 COMPLETE CBC AUTOMATED: CPT

## 2024-08-26 PROCEDURE — 80053 COMPREHEN METABOLIC PANEL: CPT

## 2024-08-27 DIAGNOSIS — Z94.4 LIVER REPLACED BY TRANSPLANT (H): ICD-10-CM

## 2024-08-27 DIAGNOSIS — D70.9 NEUTROPENIA (H): Primary | ICD-10-CM

## 2024-08-27 DIAGNOSIS — E13.9 LADA (LATENT AUTOIMMUNE DIABETES IN ADULTS), MANAGED AS TYPE 2 (H): ICD-10-CM

## 2024-08-27 RX ORDER — ACYCLOVIR 400 MG/1
TABLET ORAL
Qty: 3 EACH | Refills: 5 | Status: SHIPPED | OUTPATIENT
Start: 2024-08-27

## 2024-08-27 NOTE — PROGRESS NOTES
Outcome for 08/27/24 9:24 AM: Data uploaded on Dexcom  Feli Hannon MA  Outcome for 09/03/24 11:03 AM: Data obtained via Dexcom website  Feil Hannon MA

## 2024-08-29 ENCOUNTER — PATIENT OUTREACH (OUTPATIENT)
Dept: CARE COORDINATION | Facility: CLINIC | Age: 54
End: 2024-08-29
Payer: COMMERCIAL

## 2024-08-30 ENCOUNTER — MYC MEDICAL ADVICE (OUTPATIENT)
Dept: FAMILY MEDICINE | Facility: CLINIC | Age: 54
End: 2024-08-30
Payer: COMMERCIAL

## 2024-08-30 ENCOUNTER — TELEPHONE (OUTPATIENT)
Dept: TRANSPLANT | Facility: CLINIC | Age: 54
End: 2024-08-30
Payer: COMMERCIAL

## 2024-09-01 ENCOUNTER — LAB (OUTPATIENT)
Dept: LAB | Facility: CLINIC | Age: 54
End: 2024-09-01
Payer: COMMERCIAL

## 2024-09-01 DIAGNOSIS — D70.9 NEUTROPENIA (H): ICD-10-CM

## 2024-09-01 DIAGNOSIS — Z94.4 LIVER REPLACED BY TRANSPLANT (H): ICD-10-CM

## 2024-09-01 LAB
ALBUMIN SERPL BCG-MCNC: 4 G/DL (ref 3.5–5.2)
ALP SERPL-CCNC: 220 U/L (ref 40–150)
ALT SERPL W P-5'-P-CCNC: 38 U/L (ref 0–70)
ANION GAP SERPL CALCULATED.3IONS-SCNC: 9 MMOL/L (ref 7–15)
AST SERPL W P-5'-P-CCNC: 41 U/L (ref 0–45)
BASOPHILS # BLD AUTO: 0.1 10E3/UL (ref 0–0.2)
BASOPHILS NFR BLD AUTO: 1 %
BILIRUB DIRECT SERPL-MCNC: 0.25 MG/DL (ref 0–0.3)
BILIRUB SERPL-MCNC: 0.8 MG/DL
BUN SERPL-MCNC: 30 MG/DL (ref 6–20)
CALCIUM SERPL-MCNC: 9.6 MG/DL (ref 8.8–10.4)
CHLORIDE SERPL-SCNC: 107 MMOL/L (ref 98–107)
CREAT SERPL-MCNC: 1.34 MG/DL (ref 0.67–1.17)
EGFRCR SERPLBLD CKD-EPI 2021: 64 ML/MIN/1.73M2
EOSINOPHIL # BLD AUTO: 0.9 10E3/UL (ref 0–0.7)
EOSINOPHIL NFR BLD AUTO: 14 %
ERYTHROCYTE [DISTWIDTH] IN BLOOD BY AUTOMATED COUNT: 13.4 % (ref 10–15)
GLUCOSE SERPL-MCNC: 186 MG/DL (ref 70–99)
HCO3 SERPL-SCNC: 25 MMOL/L (ref 22–29)
HCT VFR BLD AUTO: 36 % (ref 40–53)
HGB BLD-MCNC: 11.7 G/DL (ref 13.3–17.7)
IMM GRANULOCYTES # BLD: 0 10E3/UL
IMM GRANULOCYTES NFR BLD: 1 %
LYMPHOCYTES # BLD AUTO: 1.3 10E3/UL (ref 0.8–5.3)
LYMPHOCYTES NFR BLD AUTO: 20 %
MAGNESIUM SERPL-MCNC: 2 MG/DL (ref 1.7–2.3)
MCH RBC QN AUTO: 30.1 PG (ref 26.5–33)
MCHC RBC AUTO-ENTMCNC: 32.5 G/DL (ref 31.5–36.5)
MCV RBC AUTO: 93 FL (ref 78–100)
MONOCYTES # BLD AUTO: 0.6 10E3/UL (ref 0–1.3)
MONOCYTES NFR BLD AUTO: 9 %
NEUTROPHILS # BLD AUTO: 3.6 10E3/UL (ref 1.6–8.3)
NEUTROPHILS NFR BLD AUTO: 56 %
PHOSPHATE SERPL-MCNC: 4.2 MG/DL (ref 2.5–4.5)
PLATELET # BLD AUTO: 199 10E3/UL (ref 150–450)
POTASSIUM SERPL-SCNC: 4.4 MMOL/L (ref 3.4–5.3)
PROT SERPL-MCNC: 6.8 G/DL (ref 6.4–8.3)
RBC # BLD AUTO: 3.89 10E6/UL (ref 4.4–5.9)
SODIUM SERPL-SCNC: 141 MMOL/L (ref 135–145)
WBC # BLD AUTO: 6.4 10E3/UL (ref 4–11)
WBC # BLD AUTO: 6.4 10E3/UL (ref 4–11)

## 2024-09-01 PROCEDURE — 82248 BILIRUBIN DIRECT: CPT

## 2024-09-01 PROCEDURE — 80158 DRUG ASSAY CYCLOSPORINE: CPT

## 2024-09-01 PROCEDURE — 36415 COLL VENOUS BLD VENIPUNCTURE: CPT

## 2024-09-01 PROCEDURE — 80053 COMPREHEN METABOLIC PANEL: CPT

## 2024-09-01 PROCEDURE — 83735 ASSAY OF MAGNESIUM: CPT

## 2024-09-01 PROCEDURE — 85025 COMPLETE CBC W/AUTO DIFF WBC: CPT

## 2024-09-01 PROCEDURE — 84100 ASSAY OF PHOSPHORUS: CPT

## 2024-09-02 LAB
CYCLOSPORINE BLD LC/MS/MS-MCNC: 218 UG/L (ref 50–400)
TME LAST DOSE: NORMAL H
TME LAST DOSE: NORMAL H

## 2024-09-03 ENCOUNTER — TELEPHONE (OUTPATIENT)
Dept: TRANSPLANT | Facility: CLINIC | Age: 54
End: 2024-09-03

## 2024-09-03 DIAGNOSIS — Z94.4 S/P LIVER TRANSPLANT (H): ICD-10-CM

## 2024-09-03 DIAGNOSIS — Z94.4 LIVER REPLACED BY TRANSPLANT (H): Chronic | ICD-10-CM

## 2024-09-03 ASSESSMENT — ENCOUNTER SYMPTOMS: NEW SYMPTOMS OF CORONARY ARTERY DISEASE: 0

## 2024-09-03 NOTE — TELEPHONE ENCOUNTER
Mary is now 6 mos post transplant.  Previous CSA goal for Mary was 150-200 per Dr. Muniz.  9/1 CSA level 218. Goal 150 -200.  I asked him to decrease CSA to 225 mg bid.   -Appt w/ Flavio 10/7.  Call to Adina to get recent update.  Has preop for hernia repair this Thursday, plan is for inguinal hernia repair on 9/18.    Mary has had abdominal pain for several weeks.  He has reviewed this w/  and his primary Dr. Hoyt.  The pain is not getting worse or better.  Mary did not think it necessary.   He still has abdominal pain.  Below his belly button. Gets intermittent nausea, no fevers. CT 7/24 was negative.  He believes abdominal pain may be associated w/ the hernia  He will stop ASA and dapsone on 9/5

## 2024-09-04 ENCOUNTER — VIRTUAL VISIT (OUTPATIENT)
Dept: ENDOCRINOLOGY | Facility: CLINIC | Age: 54
End: 2024-09-04
Payer: COMMERCIAL

## 2024-09-04 DIAGNOSIS — Z94.4 LIVER REPLACED BY TRANSPLANT (H): Chronic | ICD-10-CM

## 2024-09-04 PROCEDURE — 99214 OFFICE O/P EST MOD 30 MIN: CPT | Mod: 95 | Performed by: PHYSICIAN ASSISTANT

## 2024-09-04 RX ORDER — ASPIRIN 325 MG
325 TABLET ORAL DAILY
COMMUNITY
Start: 2024-09-04 | End: 2024-10-03

## 2024-09-04 RX ORDER — CYCLOSPORINE 100 MG/1
200 CAPSULE, LIQUID FILLED ORAL EVERY 12 HOURS
Qty: 120 CAPSULE | Refills: 11 | Status: SHIPPED | OUTPATIENT
Start: 2024-09-04 | End: 2024-09-10

## 2024-09-04 RX ORDER — DAPSONE 25 MG/1
50 TABLET ORAL DAILY
COMMUNITY
Start: 2024-09-04 | End: 2024-10-03

## 2024-09-04 RX ORDER — INSULIN DEGLUDEC 100 U/ML
INJECTION, SOLUTION SUBCUTANEOUS
Qty: 15 ML | Refills: 5 | Status: SHIPPED | OUTPATIENT
Start: 2024-09-04

## 2024-09-04 RX ORDER — CYCLOSPORINE 25 MG/1
25 CAPSULE, LIQUID FILLED ORAL EVERY 12 HOURS
Qty: 180 CAPSULE | Refills: 3 | Status: SHIPPED | OUTPATIENT
Start: 2024-09-04 | End: 2024-09-10

## 2024-09-04 NOTE — LETTER
9/4/2024       RE: Mary Lopez  [address]  [place] [address]     Dear Colleague,    Thank you for referring your patient, Mary Lopez, to the [facility] [place] at [facility]. Please see a copy of my visit note below.    Outcome for 08/27/24 9:24 AM: Data uploaded on Dexcom  Feli Hannon MA  Outcome for 09/03/24 11:03 AM: Data obtained via Dexcom website  Feli Hannon MA                  Time of start: 7:32 am   Time of end: 7:48 am   Total duration of video visit: 16 minutes.  Providers location: Off-site.  Patient's location: [place].    HPI  Mary Lopez is a 53 year-old male with history of type 2 diabetes mellitus.  Video visit today for diabetes follow-up.  His wife Rosio is present during our video visit today.  Pt has been seen by Liana Garcia RD/JESUSE.  Patient was admitted 3/4/2024-3/21/2024 and underwent liver transplant on 3/5/2024.  He is scheduled for inguinal herniorrhaphy on 9/18/2024.  For his diabetes, patient is currently taking Tresiba 16 units subcutaneous each am and  Lyumjev 6 units with meals with correction.  He is also taking Jardiance 25 mg each am.  Mary has not started to use the Cequr insulin patch yet- he waiting until after his upcoming hernia surgery is complete.  Most recent A1C was 7.3 % on 6/21/2024.    I reviewed and scanned his DexcomG7 sensor download data in his note below.  Average glucose 209.  Glucose in target 37 % of the time. No hypoglycemia.  He states he has been snacking more lately and craving and eating sweets.  Some of his overnight highs are related to eating sweets before bed.  On ROS today, chronic mild abd pain since his transplant.  Some SOB. He states he has not been active enough.  No fever.  Denies blurred vision,chest pain, diarrhea,  dysuria, hematuria, sx of groin yeast infection and also denies symptoms of diabetic  neuropathy.  No foot ulcers.    Diabetes Care  Retinopathy: None per patient.  Nephropathy: Urine microalbuminuria was negative in March 2022.  Neuropathy: None.  Foot Exam: No exam today.  Taking aspirin: Yes.  Lipids: LDL 75 in Aug 2024.   Insulin: Basal and mealtime insulin. Has Cequr patch which he has not used.  DM meds: Jardiance.  Testing: DexcomG7 sensor.  Hypoglycemia treatment: Has Gvoke pen.                        ROS  See under HPI.    Allergies  Allergies   Allergen Reactions     Other Food Allergy Anaphylaxis     Legumes (black beans, baked beans, chickpeas)     Pineapple Itching       Medications  Current Outpatient Medications   Medication Sig Dispense Refill     ACE/ARB/ARNI NOT PRESCRIBED (INTENTIONAL) Please choose reason not prescribed from choices below.       acetaminophen (TYLENOL) 325 MG tablet Take 3 tablets (975 mg) by mouth every 8 hours as needed for mild pain Try first before Tramadol.       aspirin (ASA) 325 MG tablet 1 tablet (325 mg) by Oral or Feeding Tube route daily for 65 days 60 tablet 1     blood glucose (NO BRAND SPECIFIED) test strip Use to test blood sugar two times daily or as directed. To accompany: Blood Glucose Monitor Brands: per insurance. 100 strip 6     blood glucose monitoring (NO BRAND SPECIFIED) meter device kit Use to test blood sugar twice times daily or as directed. Preferred blood glucose meter OR supplies to accompany: Blood Glucose Monitor Brands: per insurance. 1 kit 0     Continuous Blood Gluc Sensor (DEXCOM G7 SENSOR) MISC Change every 10 days. 3 each 5     Continuous Glucose Sensor (DEXCOM G7 SENSOR) MISC Change every 10 days. 3 each 5     cycloSPORINE modified (GENERIC EQUIVALENT) 100 MG capsule Take 2 capsules (200 mg) by mouth every 12 hours Total dose: 250 mg  capsule 11     cycloSPORINE modified (GENERIC EQUIVALENT) 25 MG capsule Take 2 capsules (50 mg) by mouth every 12 hours Total dose: 250mg  capsule 3     dapsone (ACZONE) 25 MG  tablet Take 2 tablets (50 mg) by mouth daily for 65 days Medication complete when pills run out. 132 tablet 0     empagliflozin (JARDIANCE) 25 MG TABS tablet Take 1 tablet (25 mg) by mouth daily 90 tablet 1     fludrocortisone (FLORINEF) 0.1 MG tablet Take 1 tablet (0.1 mg) by mouth daily 30 tablet 1     Glucagon (GVOKE HYPOPEN) 1 MG/0.2ML pen Inject the contents of 1 device under the skin into lower abdomen, outer thigh, or outer upper arm as needed for hypoglycemia. If no response after 15 minutes, additional 1 mg dose from a new device may be injected while waiting for emergency assistance. 0.4 mL 1     Injection Device for insulin (CEQUR SIMPLICITY 2U) KHUSHBOO 1 each every 3 days 10 each 5     insulin degludec (TRESIBA FLEXTOUCH) 100 UNIT/ML pen Inject 26 Units Subcutaneous every morning (Patient taking differently: Inject 13 Units subcutaneously every morning) 15 mL 5     Insulin Disposable Pump (OMNIPOD 5 G6 PODS, GEN 5,) MISC 1 each every 3 days Change every 3 days. (Patient taking differently: 1 each every 3 days Change every 3 days. Patient has not started insulin pump therapy) 10 each 0     Insulin Lispro-aabc, 1 U Dial, (LYUMJEV KWIKPEN) 100 UNIT/ML SOPN Inject 6 Units Subcutaneous 3 times daily (with meals) 6 units with meals, plus correction. Pt may use up to 30 units daily. This REPLACES Humalog/Novolog insulin. 30 mL 2     insulin pen needle (29G X 12.7MM) 29G X 12.7MM miscellaneous Use 1 pen needle every three days or as directed. 90 each 1     insulin pen needle (32G X 4 MM) 32G X 4 MM miscellaneous Use as directed by provider Per insurance coverage 200 each 1     insulin pen needle (BD PEN NEEDLE SHERRIE 2ND GEN) 32G X 4 MM miscellaneous USES 5 PER  each 11     Magnesium Bisglycinate (MAG GLYCINATE) 100 MG TABS Take 1 tablet (100 mg) by mouth 2 times daily 180 tablet 1     melatonin 5 MG tablet Take 1 tablet (5 mg) by mouth daily (with dinner) Take daily at dusk. 30 tablet 2     multivitamin  w/minerals (THERA-VIT-M) tablet TAKE 1 TABLET BY MOUTH DAILY 90 tablet 1     mycophenolic acid (GENERIC EQUIVALENT) 180 MG EC tablet Take 1 tablet (180 mg) by mouth 2 times daily. 180 tablet 2     omeprazole (PRILOSEC) 20 MG DR capsule Take 20 mg by mouth daily 180 capsule 1     QUEtiapine (SEROQUEL) 25 MG tablet Take 1-2 tablets (25-50 mg) by mouth nightly as needed (Sleep)       sodium zirconium cyclosilicate (LOKELMA) 10 g PACK packet Take 1 packet (10 g) by mouth daily as needed (Hyperkalemia) 30 each 0     thin (NO BRAND SPECIFIED) lancets Use with lanceting device. To accompany: Blood Glucose Monitor Brands: per insurance. 100 each 6     ursodiol (ACTIGALL) 300 MG capsule Take 1 capsule (300 mg) by mouth 2 times daily 180 capsule 3       Family History  family history includes Anxiety Disorder in his mother; Bipolar Disorder in his mother; Chronic Obstructive Pulmonary Disease in his mother; Depression in his mother; Diabetes in his father; Diabetes Type 2  in his brother; Lung Cancer (age of onset: 81) in his mother; Morbid Obesity in his father; Substance Abuse in his maternal grandfather and paternal grandfather.    Social History   reports that he has quit smoking. His smoking use included cigarettes. He has never been exposed to tobacco smoke. His smokeless tobacco use includes chew. He reports that he does not currently use alcohol after a past usage of about 12.0 standard drinks of alcohol per week. He reports that he does not currently use drugs.     Past Medical History  Past Medical History:   Diagnosis Date     ANGEL (acute kidney injury) (H24)      Alcoholic cirrhosis of liver without ascites (H) 07/13/2023     Alcoholic hepatitis with ascites (H28) 10/03/2023     Alcoholic hepatitis without ascites (H28) 07/13/2023     CKD stage 3a, GFR 45-59 ml/min (H) 08/08/2024     Closed fracture of one rib of left side 09/14/2023     Concussion without loss of consciousness 03/11/2020     Decompensated hepatic  cirrhosis (H) 09/15/2023     Diabetes mellitus, type 2 (H) 11/22/2023     Essential hypertension 03/11/2020     Ganglion cyst of wrist, right 04/18/2024     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)      Liver replaced by transplant (H) 03/05/2024     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    ACR PRAJAPATI 6-7/9, treated with steroids     Mild hyperlipidemia 12/07/2021     Persistent insomnia 07/13/2023     Portal hypertension (H) 07/13/2023     Right inguinal hernia 08/08/2024     Scrotal abscess      Secondary esophageal 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 (H) 07/13/2023     Tobacco abuse disorder 03/11/2020     Type 2 diabetes mellitus with hyperglycemia (H) 12/22/2023       Past Surgical History:   Procedure Laterality Date     BENCH LIVER  3/5/2024    Procedure: Bench liver;  Surgeon: Ryder Cortez MD;  Location: [facility]     CHOLECYSTECTOMY       COLONOSCOPY N/A 1/2/2024    Procedure: COLONOSCOPY, WITH POLYPECTOMY;  Surgeon: Jak Urbina MD;  Location: [facility]     CV RIGHT HEART CATH MEASUREMENTS RECORDED N/A 1/30/2024    Procedure: Right Heart Catheterization;  Surgeon: Alfred Tafoya MD;  [facility]     ENDOSCOPIC RETROGRADE CHOLANGIOPANCREATOGRAPHY, EXCHANGE TUBE/STENT N/A 8/13/2024    Procedure: Endoscopic Retrograde Cholangiopancreatography with biliary sphincterotomy, balloon dilation, pancreatic stent placement,biliary stent exchange;  Surgeon: Naldo Rodriguez MD;  Location: [facility] OR     ENTEROSCOPY SMALL BOWEL N/A 7/12/2024    Procedure: Enteroscopy small bowel;  Surgeon: Hugo Daigle MD;  Location: [facility]     ESOPHAGOSCOPY, GASTROSCOPY, DUODENOSCOPY (EGD), COMBINED N/A 7/12/2024    Procedure: Esophagoscopy, gastroscopy, duodenoscopy (EGD), combined;  Surgeon: Hugo Daigle MD;  Location: [facility]     IR LIVER BIOPSY PERCUTANEOUS  3/15/2024     IR RETROPERITONEAL ABSCESS DRAINAGE  4/1/2024     TONSILLECTOMY       TRANSPLANT LIVER RECIPIENT   DONOR N/A 3/5/2024    Procedure: Transplant liver recipient  donor, bile duct stent placement;  Surgeon: Ryder Cortez MD;  Location: [facility] OR     VASECTOMY         Physical Exam    No exam today.      RESULTS  Creatinine   Date Value Ref Range Status   2024 1.34 (H) 0.67 - 1.17 mg/dL Final   2020 0.95 0.66 - 1.25 mg/dL Final     GFR Estimate   Date Value Ref Range Status   2024 64 >60 mL/min/1.73m2 Final     Comment:     eGFR calculated using  CKD-EPI equation.   2020 >90 >60 mL/min/[1.73_m2] Final     Comment:     Non  GFR Calc  Starting 2018, serum creatinine based estimated GFR (eGFR) will be   calculated using the Chronic Kidney Disease Epidemiology Collaboration   (CKD-EPI) equation.       Hemoglobin A1C   Date Value Ref Range Status   2024 7.3 (H) 0.0 - 5.6 % Final     Comment:     Normal <5.7%   Prediabetes 5.7-6.4%    Diabetes 6.5% or higher     Note: Adopted from ADA consensus guidelines.     Potassium   Date Value Ref Range Status   2024 4.4 3.4 - 5.3 mmol/L Final   2022 3.8 3.4 - 5.3 mmol/L Final   2020 4.2 3.4 - 5.3 mmol/L Final     Potassium POCT   Date Value Ref Range Status   2024 4.1 3.4 - 5.3 mmol/L Final     ALT   Date Value Ref Range Status   2024 38 0 - 70 U/L Final   2006 50 0 - 70 U/L Final     AST   Date Value Ref Range Status   2024 41 0 - 45 U/L Final   2006 52 0 - 55 U/L Final     TSH   Date Value Ref Range Status   2022 2.18 0.40 - 4.00 mU/L Final       Cholesterol   Date Value Ref Range Status   2024 148 <200 mg/dL Final   2024 107 <200 mg/dL Final   2020 183 <200 mg/dL Final     HDL Cholesterol   Date Value Ref Range Status   2020 50 >39 mg/dL Final     Direct Measure HDL   Date Value Ref Range Status   2024 43 >=40 mg/dL Final   2024 46 >=40 mg/dL Final     LDL Cholesterol Calculated   Date Value Ref Range Status    08/08/2024 75 <=100 mg/dL Final   07/01/2024 45 <=100 mg/dL Final   07/05/2020 87 <100 mg/dL Final     Comment:     Desirable:       <100 mg/dl     Triglycerides   Date Value Ref Range Status   08/08/2024 151 (H) <150 mg/dL Final   07/01/2024 79 <150 mg/dL Final   07/05/2020 229 (H) <150 mg/dL Final     Comment:     Borderline high:  150-199 mg/dl  High:             200-499 mg/dl  Very high:       >499 mg/dl           ASSESSMENT/PLAN:     TYPE 2 DIABETES MELLITUS: Mary's blood sugar values remain above target during the day and evening prior to bed. He tells me he has been eating more sweets lately. He has not started to use the Cequr insulin patch - plans to start after his hernia surgery on 9/18/2024. No change in insulin doses today. He is to remain on Jardiance 25 mg each am. I reviewed how Jardiance works and possible side effect of the drug including dehydration, UTI, groin yeast infection, etc. I also discuss adding Mounjaro in a few months following his hernia surgery. He should have weight loss, less hunger, improvement in A1C and may need less insulin with use of Mounjaro. I reviewed how Mounjaro works and possible side effects of the drug including n/v, GI distress, constipation, hypoglycemia and rare risk of pancreatitis. Pt denies hx of pancreatitis or gastroparesis. For now, he is to make healthy food choices and try to walk daily.  Pt's urine microalbuminuria was negative in 3/2022.  Most recent creatinine 1.34 with GFR 64 mL/min on 9/1/2024.  Patient denies any known history of diabetic retinopathy.  Reminded him to have annual diabetic eye exam.   Patient denies any symptoms of diabetic neuropathy or foot ulcers.  No vitals today.   LIVER CIRRHOSIS: S/P liver transplant on 3/5/2024.   Followed closely by transplant staff.  LIPIDS: LDL 75 in Aug 2024.   FOLLOW UP: With me in 2 months. Will discuss adding Mounjaro at that time. Follow up with Liana Garcia RD/CDE.     Spent reviewing chart, labs and  DexcomG7 sensor data= 5 minutes.  Time for video visit today= 16  minutes.  Time for documentation today= 10 minutes.    Total time for visit today= 31 minutes.    Geovanna Ferreira PA-C        Again, thank you for allowing me to participate in the care of your patient.      Sincerely,    Geovanna Ferreira PA-C

## 2024-09-04 NOTE — PROGRESS NOTES
Time of start: 7:32 am   Time of end: 7:48 am   Total duration of video visit: 16 minutes.  Providers location: Off-site.  Patient's location: [place].    HPI  Mary Lopez is a 53 year-old male with history of type 2 diabetes mellitus.  Video visit today for diabetes follow-up.  His wife Rosio is present during our video visit today.  Pt has been seen by Liana Garcia RD/JARROD.  Patient was admitted 3/4/2024-3/21/2024 and underwent liver transplant on 3/5/2024.  He is scheduled for inguinal herniorrhaphy on 9/18/2024.  For his diabetes, patient is currently taking Tresiba 16 units subcutaneous each am and  Lyumjev 6 units with meals with correction.  He is also taking Jardiance 25 mg each am.  Mary has not started to use the Cequr insulin patch yet- he waiting until after his upcoming hernia surgery is complete.  Most recent A1C was 7.3 % on 6/21/2024.    I reviewed and scanned his DexcomG7 sensor download data in his note below.  Average glucose 209.  Glucose in target 37 % of the time. No hypoglycemia.  He states he has been snacking more lately and craving and eating sweets.  Some of his overnight highs are related to eating sweets before bed.  On ROS today, chronic mild abd pain since his transplant.  Some SOB. He states he has not been active enough.  No fever.  Denies blurred vision,chest pain, diarrhea,  dysuria, hematuria, sx of groin yeast infection and also denies symptoms of diabetic neuropathy.  No foot ulcers.    Diabetes Care  Retinopathy: None per patient.  Nephropathy: Urine microalbuminuria was negative in March 2022.  Neuropathy: None.  Foot Exam: No exam today.  Taking aspirin: Yes.  Lipids: LDL 75 in Aug 2024.   Insulin: Basal and mealtime insulin. Has Cequr patch which he has not used.  DM meds: Jardiance.  Testing: DexcomG7 sensor.  Hypoglycemia treatment: Has Gvoke pen.                        ROS  See under HPI.    Allergies  Allergies   Allergen Reactions    Other Food Allergy  Anaphylaxis     Legumes (black beans, baked beans, chickpeas)    Pineapple Itching       Medications  Current Outpatient Medications   Medication Sig Dispense Refill    ACE/ARB/ARNI NOT PRESCRIBED (INTENTIONAL) Please choose reason not prescribed from choices below.      acetaminophen (TYLENOL) 325 MG tablet Take 3 tablets (975 mg) by mouth every 8 hours as needed for mild pain Try first before Tramadol.      aspirin (ASA) 325 MG tablet 1 tablet (325 mg) by Oral or Feeding Tube route daily for 65 days 60 tablet 1    blood glucose (NO BRAND SPECIFIED) test strip Use to test blood sugar two times daily or as directed. To accompany: Blood Glucose Monitor Brands: per insurance. 100 strip 6    blood glucose monitoring (NO BRAND SPECIFIED) meter device kit Use to test blood sugar twice times daily or as directed. Preferred blood glucose meter OR supplies to accompany: Blood Glucose Monitor Brands: per insurance. 1 kit 0    Continuous Blood Gluc Sensor (DEXCOM G7 SENSOR) MISC Change every 10 days. 3 each 5    Continuous Glucose Sensor (DEXCOM G7 SENSOR) MISC Change every 10 days. 3 each 5    cycloSPORINE modified (GENERIC EQUIVALENT) 100 MG capsule Take 2 capsules (200 mg) by mouth every 12 hours Total dose: 250 mg  capsule 11    cycloSPORINE modified (GENERIC EQUIVALENT) 25 MG capsule Take 2 capsules (50 mg) by mouth every 12 hours Total dose: 250mg  capsule 3    dapsone (ACZONE) 25 MG tablet Take 2 tablets (50 mg) by mouth daily for 65 days Medication complete when pills run out. 132 tablet 0    empagliflozin (JARDIANCE) 25 MG TABS tablet Take 1 tablet (25 mg) by mouth daily 90 tablet 1    fludrocortisone (FLORINEF) 0.1 MG tablet Take 1 tablet (0.1 mg) by mouth daily 30 tablet 1    Glucagon (GVOKE HYPOPEN) 1 MG/0.2ML pen Inject the contents of 1 device under the skin into lower abdomen, outer thigh, or outer upper arm as needed for hypoglycemia. If no response after 15 minutes, additional 1 mg dose from a  new device may be injected while waiting for emergency assistance. 0.4 mL 1    Injection Device for insulin (CEQUR SIMPLICITY 2U) KHUSHBOO 1 each every 3 days 10 each 5    insulin degludec (TRESIBA FLEXTOUCH) 100 UNIT/ML pen Inject 26 Units Subcutaneous every morning (Patient taking differently: Inject 13 Units subcutaneously every morning) 15 mL 5    Insulin Disposable Pump (OMNIPOD 5 G6 PODS, GEN 5,) MISC 1 each every 3 days Change every 3 days. (Patient taking differently: 1 each every 3 days Change every 3 days. Patient has not started insulin pump therapy) 10 each 0    Insulin Lispro-aabc, 1 U Dial, (LYUMJEV KWIKPEN) 100 UNIT/ML SOPN Inject 6 Units Subcutaneous 3 times daily (with meals) 6 units with meals, plus correction. Pt may use up to 30 units daily. This REPLACES Humalog/Novolog insulin. 30 mL 2    insulin pen needle (29G X 12.7MM) 29G X 12.7MM miscellaneous Use 1 pen needle every three days or as directed. 90 each 1    insulin pen needle (32G X 4 MM) 32G X 4 MM miscellaneous Use as directed by provider Per insurance coverage 200 each 1    insulin pen needle (BD PEN NEEDLE SHERRIE 2ND GEN) 32G X 4 MM miscellaneous USES 5 PER  each 11    Magnesium Bisglycinate (MAG GLYCINATE) 100 MG TABS Take 1 tablet (100 mg) by mouth 2 times daily 180 tablet 1    melatonin 5 MG tablet Take 1 tablet (5 mg) by mouth daily (with dinner) Take daily at dusk. 30 tablet 2    multivitamin w/minerals (THERA-VIT-M) tablet TAKE 1 TABLET BY MOUTH DAILY 90 tablet 1    mycophenolic acid (GENERIC EQUIVALENT) 180 MG EC tablet Take 1 tablet (180 mg) by mouth 2 times daily. 180 tablet 2    omeprazole (PRILOSEC) 20 MG DR capsule Take 20 mg by mouth daily 180 capsule 1    QUEtiapine (SEROQUEL) 25 MG tablet Take 1-2 tablets (25-50 mg) by mouth nightly as needed (Sleep)      sodium zirconium cyclosilicate (LOKELMA) 10 g PACK packet Take 1 packet (10 g) by mouth daily as needed (Hyperkalemia) 30 each 0    thin (NO BRAND SPECIFIED) lancets Use  with lanceting device. To accompany: Blood Glucose Monitor Brands: per insurance. 100 each 6    ursodiol (ACTIGALL) 300 MG capsule Take 1 capsule (300 mg) by mouth 2 times daily 180 capsule 3       Family History  family history includes Anxiety Disorder in his mother; Bipolar Disorder in his mother; Chronic Obstructive Pulmonary Disease in his mother; Depression in his mother; Diabetes in his father; Diabetes Type 2  in his brother; Lung Cancer (age of onset: 81) in his mother; Morbid Obesity in his father; Substance Abuse in his maternal grandfather and paternal grandfather.    Social History   reports that he has quit smoking. His smoking use included cigarettes. He has never been exposed to tobacco smoke. His smokeless tobacco use includes chew. He reports that he does not currently use alcohol after a past usage of about 12.0 standard drinks of alcohol per week. He reports that he does not currently use drugs.     Past Medical History  Past Medical History:   Diagnosis Date    ANGEL (acute kidney injury) (H24)     Alcoholic cirrhosis of liver without ascites (H) 07/13/2023    Alcoholic hepatitis with ascites (H28) 10/03/2023    Alcoholic hepatitis without ascites (H28) 07/13/2023    CKD stage 3a, GFR 45-59 ml/min (H) 08/08/2024    Closed fracture of one rib of left side 09/14/2023    Concussion without loss of consciousness 03/11/2020    Decompensated hepatic cirrhosis (H) 09/15/2023    Diabetes mellitus, type 2 (H) 11/22/2023    Essential hypertension 03/11/2020    Ganglion cyst of wrist, right 04/18/2024    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)     Liver replaced by transplant (H) 03/05/2024    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    ACR PRAJAPATI 6-7/9, treated with steroids    Mild hyperlipidemia 12/07/2021    Persistent insomnia 07/13/2023    Portal hypertension (H) 07/13/2023    Right inguinal hernia 08/08/2024    Scrotal abscess     Secondary esophageal 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 (H)  2023    Tobacco abuse disorder 2020    Type 2 diabetes mellitus with hyperglycemia (H) 2023       Past Surgical History:   Procedure Laterality Date    BENCH LIVER  3/5/2024    Procedure: Bench liver;  Surgeon: Ryder Cortez MD;  Location: [facility]    CHOLECYSTECTOMY      COLONOSCOPY N/A 2024    Procedure: COLONOSCOPY, WITH POLYPECTOMY;  Surgeon: Jak Urbina MD;  Location: [facility]    CV RIGHT HEART CATH MEASUREMENTS RECORDED N/A 2024    Procedure: Right Heart Catheterization;  Surgeon: Alfred Tafoya MD;  Location:  [facility]    ENDOSCOPIC RETROGRADE CHOLANGIOPANCREATOGRAPHY, EXCHANGE TUBE/STENT N/A 2024    Procedure: Endoscopic Retrograde Cholangiopancreatography with biliary sphincterotomy, balloon dilation, pancreatic stent placement,biliary stent exchange;  Surgeon: Naldo Rodriguez MD;  Location: [facility]    ENTEROSCOPY SMALL BOWEL N/A 2024    Procedure: Enteroscopy small bowel;  Surgeon: Hugo Daigle MD;  Location: [facility]    ESOPHAGOSCOPY, GASTROSCOPY, DUODENOSCOPY (EGD), COMBINED N/A 2024    Procedure: Esophagoscopy, gastroscopy, duodenoscopy (EGD), combined;  Surgeon: Hugo Daigle MD;  Location: [facility]    IR LIVER BIOPSY PERCUTANEOUS  3/15/2024    IR RETROPERITONEAL ABSCESS DRAINAGE  2024    TONSILLECTOMY      TRANSPLANT LIVER RECIPIENT  DONOR N/A 3/5/2024    Procedure: Transplant liver recipient  donor, bile duct stent placement;  Surgeon: Ryder Cortez MD;  Location: [facility] OR    VASECTOMY         Physical Exam    No exam today.      RESULTS  Creatinine   Date Value Ref Range Status   2024 1.34 (H) 0.67 - 1.17 mg/dL Final   2020 0.95 0.66 - 1.25 mg/dL Final     GFR Estimate   Date Value Ref Range Status   2024 64 >60 mL/min/1.73m2 Final     Comment:     eGFR calculated using  CKD-EPI equation.   2020 >90 >60 mL/min/[1.73_m2] Final     Comment:     Non   American GFR Calc  Starting 12/18/2018, serum creatinine based estimated GFR (eGFR) will be   calculated using the Chronic Kidney Disease Epidemiology Collaboration   (CKD-EPI) equation.       Hemoglobin A1C   Date Value Ref Range Status   06/21/2024 7.3 (H) 0.0 - 5.6 % Final     Comment:     Normal <5.7%   Prediabetes 5.7-6.4%    Diabetes 6.5% or higher     Note: Adopted from ADA consensus guidelines.     Potassium   Date Value Ref Range Status   09/01/2024 4.4 3.4 - 5.3 mmol/L Final   03/12/2022 3.8 3.4 - 5.3 mmol/L Final   07/05/2020 4.2 3.4 - 5.3 mmol/L Final     Potassium POCT   Date Value Ref Range Status   03/05/2024 4.1 3.4 - 5.3 mmol/L Final     ALT   Date Value Ref Range Status   09/01/2024 38 0 - 70 U/L Final   12/19/2006 50 0 - 70 U/L Final     AST   Date Value Ref Range Status   09/01/2024 41 0 - 45 U/L Final   12/19/2006 52 0 - 55 U/L Final     TSH   Date Value Ref Range Status   03/12/2022 2.18 0.40 - 4.00 mU/L Final       Cholesterol   Date Value Ref Range Status   08/08/2024 148 <200 mg/dL Final   07/01/2024 107 <200 mg/dL Final   07/05/2020 183 <200 mg/dL Final     HDL Cholesterol   Date Value Ref Range Status   07/05/2020 50 >39 mg/dL Final     Direct Measure HDL   Date Value Ref Range Status   08/08/2024 43 >=40 mg/dL Final   07/01/2024 46 >=40 mg/dL Final     LDL Cholesterol Calculated   Date Value Ref Range Status   08/08/2024 75 <=100 mg/dL Final   07/01/2024 45 <=100 mg/dL Final   07/05/2020 87 <100 mg/dL Final     Comment:     Desirable:       <100 mg/dl     Triglycerides   Date Value Ref Range Status   08/08/2024 151 (H) <150 mg/dL Final   07/01/2024 79 <150 mg/dL Final   07/05/2020 229 (H) <150 mg/dL Final     Comment:     Borderline high:  150-199 mg/dl  High:             200-499 mg/dl  Very high:       >499 mg/dl           ASSESSMENT/PLAN:     TYPE 2 DIABETES MELLITUS: Mary's blood sugar values remain above target during the day and evening prior to bed. He tells me he has been eating  more sweets lately. He has not started to use the Cequr insulin patch - plans to start after his hernia surgery on 9/18/2024. No change in insulin doses today. He is to remain on Jardiance 25 mg each am. I reviewed how Jardiance works and possible side effect of the drug including dehydration, UTI, groin yeast infection, etc. I also discuss adding Mounjaro in a few months following his hernia surgery. He should have weight loss, less hunger, improvement in A1C and may need less insulin with use of Mounjaro. I reviewed how Mounjaro works and possible side effects of the drug including n/v, GI distress, constipation, hypoglycemia and rare risk of pancreatitis. Pt denies hx of pancreatitis or gastroparesis. For now, he is to make healthy food choices and try to walk daily.  Pt's urine microalbuminuria was negative in 3/2022.  Most recent creatinine 1.34 with GFR 64 mL/min on 9/1/2024.  Patient denies any known history of diabetic retinopathy.  Reminded him to have annual diabetic eye exam.   Patient denies any symptoms of diabetic neuropathy or foot ulcers.  No vitals today.   LIVER CIRRHOSIS: S/P liver transplant on 3/5/2024.   Followed closely by transplant staff.  LIPIDS: LDL 75 in Aug 2024.   FOLLOW UP: With me in 2 months. Will discuss adding Mounjaro at that time. Follow up with Liana Garcia RD/JARROD.     Spent reviewing chart, labs and DexcomG7 sensor data= 5 minutes.  Time for video visit today= 16  minutes.  Time for documentation today= 10 minutes.    Total time for visit today= 31 minutes.    Geovanna Ferreira PA-C

## 2024-09-05 ENCOUNTER — OFFICE VISIT (OUTPATIENT)
Dept: FAMILY MEDICINE | Facility: CLINIC | Age: 54
End: 2024-09-05
Payer: COMMERCIAL

## 2024-09-05 VITALS
SYSTOLIC BLOOD PRESSURE: 131 MMHG | HEART RATE: 74 BPM | TEMPERATURE: 97.5 F | BODY MASS INDEX: 30.19 KG/M2 | DIASTOLIC BLOOD PRESSURE: 85 MMHG | RESPIRATION RATE: 16 BRPM | WEIGHT: 198.56 LBS

## 2024-09-05 DIAGNOSIS — Z98.890 HISTORY OF BILIARY DUCT STENT PLACEMENT: ICD-10-CM

## 2024-09-05 DIAGNOSIS — Z01.818 PREOP GENERAL PHYSICAL EXAM: Primary | ICD-10-CM

## 2024-09-05 DIAGNOSIS — F10.91 ALCOHOL USE DISORDER IN REMISSION: ICD-10-CM

## 2024-09-05 DIAGNOSIS — K40.90 RIGHT INGUINAL HERNIA: ICD-10-CM

## 2024-09-05 DIAGNOSIS — N18.31 CKD STAGE 3A, GFR 45-59 ML/MIN (H): ICD-10-CM

## 2024-09-05 DIAGNOSIS — Z94.4 LIVER REPLACED BY TRANSPLANT (H): Chronic | ICD-10-CM

## 2024-09-05 DIAGNOSIS — R10.9 ABDOMINAL WALL PAIN: ICD-10-CM

## 2024-09-05 DIAGNOSIS — Z79.4 TYPE 2 DIABETES MELLITUS WITHOUT COMPLICATION, WITH LONG-TERM CURRENT USE OF INSULIN (H): Chronic | ICD-10-CM

## 2024-09-05 DIAGNOSIS — D84.9 IMMUNOSUPPRESSED STATUS (H): Chronic | ICD-10-CM

## 2024-09-05 DIAGNOSIS — E11.9 TYPE 2 DIABETES MELLITUS WITHOUT COMPLICATION, WITH LONG-TERM CURRENT USE OF INSULIN (H): Chronic | ICD-10-CM

## 2024-09-05 PROCEDURE — 99214 OFFICE O/P EST MOD 30 MIN: CPT | Performed by: INTERNAL MEDICINE

## 2024-09-05 ASSESSMENT — PAIN SCALES - GENERAL: PAINLEVEL: NO PAIN (0)

## 2024-09-05 NOTE — PROGRESS NOTES
Preoperative Evaluation  [address] [address]  Phone: [phone]  Primary Provider: Jimmie Hoyt MD  Pre-op Performing Provider: Jimmie Hoyt MD  Sep 5, 2024             9/5/2024   Surgical Information   What procedure is being done? Right inguinal hernia repair   [facility] or [facility]   Who is doing the procedure / surgery?   Ryder Cortez MD    Date of surgery / procedure: sept 18   Time of surgery / procedure: 9pm   Where do you plan to recover after surgery? at home with family        Fax number for surgical facility: Note does not need to be faxed, will be available electronically in Epic.    Assessment & Plan     1.  Preop physical examination completed.  Patient is medically optimized to undergo planned surgical procedure.  2.  Right inguinal hernia moderate size quite symptomatic.  Patient to undergo open hernia repair.  3.  Liver replaced by transplant on 3/5/2024.  4.  Anterior abdominal wall pain ever since liver transplant.  Examination is consistent with abdominal wall pain and not intra-abdominal pathology.  Patient reassured.  After he recovers from inguinal hernia repair we will see if the symptoms are still persistent.  5.  Type 2 diabetes mellitus under relatively good control with Tresiba, lispro and empagliflozin.  Last A1c 7.3 on 6/21/2024.  Followed by endocrinology.  6.  Chronic kidney disease stage III yea remaining stable.  7.  Immunosuppressed status.  8.  Alcohol use disorder in remission.  9.  History of biliary stent placement.  On 8/13/2024 biliary splinter ectomy performed with removal of the biliary stent, balloon dilatation of the anastomotic stricture and replacement of both biliary and pancreatic stent.      The proposed surgical procedure is considered INTERMEDIATE risk.    Recommendation  Approval given to proceed with proposed procedure, without  further diagnostic evaluation.    Elmo Hernandez is a 53 year old, presenting for the following:  Pre-Op Exam    59-year-old gentleman with past history of alcohol abuse, status post liver transplant in March 2024 has a symptomatic reducible right inguinal hernia for which she is to undergo open repair. He has been feeling really good and has no current complaints except that he has ongoing persistent pain of the abdomen in the mid and lower region. On touching the abdominal wall its tender. He has no change in bowel habits and otherwise feels good. No chest pain or shortness of breath.  Glycemic control is okay.  No hypoglycemia.          9/5/2024     9:19 AM   Additional Questions   Roomed by Tatiana CLARKE   Accompanied by self         9/5/2024     9:19 AM   Patient Reported Additional Medications   Patient reports taking the following new medications None     HPI related to upcoming procedure:             9/5/2024   Pre-Op Questionnaire   Have you ever had a heart attack or stroke? No   Have you ever had surgery on your heart or blood vessels, such as a stent placement, a coronary artery bypass, or surgery on an artery in your head, neck, heart, or legs? (!) YES    Do you have chest pain with activity? No   Do you have a history of heart failure? No   Do you currently have a cold, bronchitis or symptoms of other infection? No   Do you have a cough, shortness of breath, or wheezing? No   Do you or anyone in your family have previous history of blood clots? No   Do you or does anyone in your family have a serious bleeding problem such as prolonged bleeding following surgeries or cuts? No   Have you ever had problems with anemia or been told to take iron pills? No   Have you had any abnormal blood loss such as black, tarry or bloody stools? No   Have you ever had a blood transfusion? No   Are you willing to have a blood transfusion if it is medically needed before, during, or after your surgery? Yes   Have you or any  of your relatives ever had problems with anesthesia? No   Do you have sleep apnea, excessive snoring or daytime drowsiness? No   Do you have any artifical heart valves or other implanted medical devices like a pacemaker, defibrillator, or continuous glucose monitor? No   Do you have artificial joints? No   Are you allergic to latex? 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     Priority: Medium    Ganglion cyst of wrist, right 04/18/2024     Priority: Medium    Hypomagnesemia 03/21/2024     Priority: Medium    Immunosuppressed status (H24) 03/20/2024     Priority: Medium    Liver replaced by transplant (H) 03/05/2024     Priority: Medium    Type 2 diabetes mellitus without complication, with long-term current use of insulin (H) 11/22/2023     Priority: Medium    Persistent insomnia 07/13/2023     Priority: Medium    Mild hyperlipidemia 12/07/2021     Priority: Medium    Alcohol use disorder in remission 03/26/2014     Priority: Medium     Sober since 6/2023        Past Medical History:   Diagnosis Date    ANGEL (acute kidney injury) (H24)     Alcoholic cirrhosis of liver without ascites (H) 07/13/2023    Alcoholic hepatitis with ascites (H28) 10/03/2023    Alcoholic hepatitis without ascites (H28) 07/13/2023    CKD stage 3a, GFR 45-59 ml/min (H) 08/08/2024    Closed fracture of one rib of left side 09/14/2023    Concussion without loss of consciousness 03/11/2020    Decompensated hepatic cirrhosis (H) 09/15/2023    Diabetes mellitus, type 2 (H) 11/22/2023    Essential hypertension 03/11/2020    Ganglion cyst of wrist, right 04/18/2024    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)     Liver replaced by transplant (H)  2024    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    ACR PRAJAPATI 6-7/9, treated with steroids    Mild hyperlipidemia 2021    Persistent insomnia 2023    Portal hypertension (H) 2023    Right inguinal hernia 2024    Scrotal abscess     Secondary esophageal 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 (H) 2023    Tobacco abuse disorder 2020    Type 2 diabetes mellitus with hyperglycemia (H) 2023     Past Surgical History:   Procedure Laterality Date    BENCH LIVER  3/5/2024    Procedure: Bench liver;  Surgeon: Ryder Cortez MD;  Location: [facility]    CHOLECYSTECTOMY      COLONOSCOPY N/A 2024    Procedure: COLONOSCOPY, WITH POLYPECTOMY;  Surgeon: Jak Urbina MD;  Location: [facility]    CV RIGHT HEART CATH MEASUREMENTS RECORDED N/A 2024    Procedure: Right Heart Catheterization;  Surgeon: Alfred Tafoya MD;  Location:  [facility]    ENDOSCOPIC RETROGRADE CHOLANGIOPANCREATOGRAPHY, EXCHANGE TUBE/STENT N/A 2024    Procedure: Endoscopic Retrograde Cholangiopancreatography with biliary sphincterotomy, balloon dilation, pancreatic stent placement,biliary stent exchange;  Surgeon: Naldo Rodriguez MD;  Location: [facility]    ENTEROSCOPY SMALL BOWEL N/A 2024    Procedure: Enteroscopy small bowel;  Surgeon: Hugo Daigle MD;  Location: [facility]    ESOPHAGOSCOPY, GASTROSCOPY, DUODENOSCOPY (EGD), COMBINED N/A 2024    Procedure: Esophagoscopy, gastroscopy, duodenoscopy (EGD), combined;  Surgeon: Hugo Daigle MD;  Location: [facility]    IR LIVER BIOPSY PERCUTANEOUS  3/15/2024    IR RETROPERITONEAL ABSCESS DRAINAGE  2024    TONSILLECTOMY      TRANSPLANT LIVER RECIPIENT  DONOR N/A 3/5/2024    Procedure: Transplant liver recipient  donor, bile duct stent placement;  Surgeon: Ryder Cortez MD;  Location: [facility]    VASECTOMY       Current Outpatient Medications   Medication Sig Dispense Refill    ACE/ARB/ARNI NOT PRESCRIBED  (INTENTIONAL) Please choose reason not prescribed from choices below.      acetaminophen (TYLENOL) 325 MG tablet Take 3 tablets (975 mg) by mouth every 8 hours as needed for mild pain Try first before Tramadol.      aspirin (ASA) 325 MG tablet Take 1 tablet (325 mg) by mouth or Feeding Tube daily.      blood glucose (NO BRAND SPECIFIED) test strip Use to test blood sugar two times daily or as directed. To accompany: Blood Glucose Monitor Brands: per insurance. 100 strip 6    blood glucose monitoring (NO BRAND SPECIFIED) meter device kit Use to test blood sugar twice times daily or as directed. Preferred blood glucose meter OR supplies to accompany: Blood Glucose Monitor Brands: per insurance. 1 kit 0    Continuous Glucose Sensor (DEXCOM G7 SENSOR) MISC Change every 10 days. 3 each 5    cycloSPORINE modified (GENERIC EQUIVALENT) 100 MG capsule Take 2 capsules (200 mg) by mouth every 12 hours. Total dose: 225 mg  capsule 11    cycloSPORINE modified (GENERIC EQUIVALENT) 25 MG capsule Take 1 capsule (25 mg) by mouth every 12 hours. Total dose: 225mg  capsule 3    dapsone (ACZONE) 25 MG tablet Take 2 tablets (50 mg) by mouth daily. Medication complete when pills run out.      empagliflozin (JARDIANCE) 25 MG TABS tablet Take 1 tablet (25 mg) by mouth daily 90 tablet 1    fludrocortisone (FLORINEF) 0.1 MG tablet Take 1 tablet (0.1 mg) by mouth daily 30 tablet 1    Glucagon (GVOKE HYPOPEN) 1 MG/0.2ML pen Inject the contents of 1 device under the skin into lower abdomen, outer thigh, or outer upper arm as needed for hypoglycemia. If no response after 15 minutes, additional 1 mg dose from a new device may be injected while waiting for emergency assistance. 0.4 mL 1    Injection Device for insulin (CEQUR SIMPLICITY 2U) KHUSHBOO 1 each every 3 days 10 each 5    insulin degludec (TRESIBA FLEXTOUCH) 100 UNIT/ML pen Inject 16 units subcutaneous each am. Please do NOT fill today ( 9/4/2024 ). 15 mL 5    Insulin  Lispro-aabc, 1 U Dial, (LYUMTHEODORAV KWIKPEN) 100 UNIT/ML SOPN Inject 6 Units Subcutaneous 3 times daily (with meals) 6 units with meals, plus correction. Pt may use up to 30 units daily. This REPLACES Humalog/Novolog insulin. 30 mL 2    insulin pen needle (29G X 12.7MM) 29G X 12.7MM miscellaneous Use 1 pen needle every three days or as directed. 90 each 1    insulin pen needle (32G X 4 MM) 32G X 4 MM miscellaneous Use as directed by provider Per insurance coverage 200 each 1    insulin pen needle (BD PEN NEEDLE SHERRIE 2ND GEN) 32G X 4 MM miscellaneous USES 5 PER  each 11    Magnesium Bisglycinate (MAG GLYCINATE) 100 MG TABS Take 1 tablet (100 mg) by mouth 2 times daily 180 tablet 1    melatonin 5 MG tablet Take 1 tablet (5 mg) by mouth daily (with dinner) Take daily at dusk. 30 tablet 2    multivitamin w/minerals (THERA-VIT-M) tablet TAKE 1 TABLET BY MOUTH DAILY 90 tablet 1    mycophenolic acid (GENERIC EQUIVALENT) 180 MG EC tablet Take 1 tablet (180 mg) by mouth 2 times daily. 180 tablet 2    omeprazole (PRILOSEC) 20 MG DR capsule Take 20 mg by mouth daily 180 capsule 1    QUEtiapine (SEROQUEL) 25 MG tablet Take 1-2 tablets (25-50 mg) by mouth nightly as needed (Sleep)      sodium zirconium cyclosilicate (LOKELMA) 10 g PACK packet Take 1 packet (10 g) by mouth daily as needed (Hyperkalemia) 30 each 0    thin (NO BRAND SPECIFIED) lancets Use with lanceting device. To accompany: Blood Glucose Monitor Brands: per insurance. 100 each 6    ursodiol (ACTIGALL) 300 MG capsule Take 1 capsule (300 mg) by mouth 2 times daily 180 capsule 3       Allergies   Allergen Reactions    Other Food Allergy Anaphylaxis     Legumes (black beans, baked beans, chickpeas)    Pineapple Itching        Social History     Tobacco Use    Smoking status: Former     Types: Cigarettes     Passive exposure: Never    Smokeless tobacco: Current     Types: Chew     Last attempt to quit: 1/1/2004    Tobacco comments:     Chew daily 1/8 of a tin per  "day   Substance Use Topics    Alcohol use: Not Currently     Alcohol/week: 12.0 standard drinks of alcohol     Types: 12 Standard drinks or equivalent per week     Comment: Sober since 6/25/2023     Family History   Problem Relation Age of Onset    Anxiety Disorder Mother     Depression Mother     Bipolar Disorder Mother     Chronic Obstructive Pulmonary Disease Mother     Lung Cancer Mother 81    Morbid Obesity Father     Diabetes Father     Diabetes Type 2  Brother     Substance Abuse Maternal Grandfather     Substance Abuse Paternal Grandfather     Colon Cancer No family hx of     Liver Disease No family hx of      History   Drug Use Unknown             Review of Systems  Constitutional, HEENT, cardiovascular, pulmonary, GI, , musculoskeletal, neuro, skin, endocrine and psych systems are negative, except as otherwise noted.    Objective    /85 (BP Location: Right arm, Patient Position: Sitting, Cuff Size: Adult Large)   Pulse 74   Temp 97.5  F (36.4  C) (Oral)   Resp 16   Wt 90.1 kg (198 lb 9 oz)   BMI 30.19 kg/m     Estimated body mass index is 30.19 kg/m  as calculated from the following:    Height as of 8/13/24: 1.727 m (5' 8\").    Weight as of this encounter: 90.1 kg (198 lb 9 oz).  Physical Exam  GENERAL: alert and no distress  EYES: Eyes grossly normal to inspection, PERRL and conjunctivae and sclerae normal  HENT: ear canals and TM's normal, nose and mouth without ulcers or lesions  NECK: no adenopathy, no asymmetry, masses, or scars  RESP: lungs clear to auscultation - no rales, rhonchi or wheezes  CV: regular rate and rhythm, normal S1 S2, no S3 or S4, no murmur, click or rub, no peripheral edema  ABDOMEN: soft, nontender, without hepatosplenomegaly or masses, bowel sounds normal. Moderate size right inguinal hernia. Tender to touch.   Tenderness of anterior abdominal wall .  MS: no gross musculoskeletal defects noted, no edema  SKIN: no suspicious lesions or rashes  NEURO: Normal strength " and tone, mentation intact and speech normal  PSYCH: mentation appears normal, affect normal/bright    Recent Labs   Lab Test 09/01/24  0919 08/26/24  0747 08/19/24  0748 08/13/24  0856 06/22/24  2109 06/21/24  1013 04/01/24  1339 04/01/24  0924 03/31/24  1511 03/31/24  1339   HGB 11.7* 10.4*   < > 10.4*   < > 13.9   < >  --    < >  --     236   < > 166   < > 170   < >  --    < >  --    INR  --   --   --  1.12  --   --   --   --   --  1.13    142   < > 140   < > 131*   < >  --    < >  --    POTASSIUM 4.4 5.0   < > 4.2   < > 7.3*   < >  --    < >  --    CR 1.34* 1.55*   < > 1.27*   < > 2.15*   < >  --    < >  --    A1C  --   --   --   --   --  7.3*  --  5.6  --   --     < > = values in this interval not displayed.        Diagnostics   Latest Reference Range & Units 09/01/24 09:19   Potassium 3.4 - 5.3 mmol/L 4.4   Chloride 98 - 107 mmol/L 107   Carbon Dioxide (CO2) 22 - 29 mmol/L 25   Urea Nitrogen 6.0 - 20.0 mg/dL 30.0 (H)   Creatinine 0.67 - 1.17 mg/dL 1.34 (H)   GFR Estimate >60 mL/min/1.73m2 64   Calcium 8.8 - 10.4 mg/dL 9.6   Anion Gap 7 - 15 mmol/L 9   Magnesium 1.7 - 2.3 mg/dL 2.0   Phosphorus 2.5 - 4.5 mg/dL 4.2   Albumin 3.5 - 5.2 g/dL 4.0   Protein Total 6.4 - 8.3 g/dL 6.8   Alkaline Phosphatase 40 - 150 U/L 220 (H)   ALT 0 - 70 U/L 38   AST 0 - 45 U/L 41   Bilirubin Direct 0.00 - 0.30 mg/dL 0.25   Bilirubin Total <=1.2 mg/dL 0.8   (H): Data is abnormally high   Latest Reference Range & Units 09/01/24 09:19   Bilirubin Direct 0.00 - 0.30 mg/dL 0.25   Bilirubin Total <=1.2 mg/dL 0.8   Glucose 70 - 99 mg/dL 186 (H)   (H): Data is abnormally high    Latest Reference Range & Units 09/01/24 09:19   WBC 4.0 - 11.0 10e3/uL  4.0 - 11.0 10e3/uL 6.4  6.4   Hemoglobin 13.3 - 17.7 g/dL 11.7 (L)   Hematocrit 40.0 - 53.0 % 36.0 (L)   Platelet Count 150 - 450 10e3/uL 199   RBC Count 4.40 - 5.90 10e6/uL 3.89 (L)   MCV 78 - 100 fL 93   MCH 26.5 - 33.0 pg 30.1   MCHC 31.5 - 36.5 g/dL 32.5   RDW 10.0 - 15.0 %  13.4   % Neutrophils % 56   % Lymphocytes % 20   % Monocytes % 9   % Eosinophils % 14   % Basophils % 1   Absolute Basophils 0.0 - 0.2 10e3/uL 0.1   Absolute Eosinophils 0.0 - 0.7 10e3/uL 0.9 (H)   Absolute Immature Granulocytes <=0.4 10e3/uL 0.0   (L): Data is abnormally low  (H): Data is abnormally high  No EKG required, no history of coronary heart disease, significant arrhythmia, peripheral arterial disease or other structural heart disease.    Revised Cardiac Risk Index (RCRI)  The patient has the following serious cardiovascular risks for perioperative complications:   - No serious cardiac risks = 0 points     RCRI Interpretation: 0 points: Class I (very low risk - 0.4% complication rate)         Signed Electronically by: Jimmie Hoyt MD  A copy of this evaluation report is provided to the requesting physician.

## 2024-09-09 ENCOUNTER — LAB (OUTPATIENT)
Dept: LAB | Facility: CLINIC | Age: 54
End: 2024-09-09
Payer: COMMERCIAL

## 2024-09-09 ENCOUNTER — ANCILLARY PROCEDURE (OUTPATIENT)
Dept: GENERAL RADIOLOGY | Facility: CLINIC | Age: 54
End: 2024-09-09
Attending: INTERNAL MEDICINE
Payer: COMMERCIAL

## 2024-09-09 DIAGNOSIS — D72.819 LEUKOPENIA: ICD-10-CM

## 2024-09-09 DIAGNOSIS — Z46.89 ENCOUNTER FOR REMOVAL OF BILIARY STENT: ICD-10-CM

## 2024-09-09 DIAGNOSIS — Z94.4 LIVER REPLACED BY TRANSPLANT (H): ICD-10-CM

## 2024-09-09 DIAGNOSIS — D70.9 NEUTROPENIA (H): ICD-10-CM

## 2024-09-09 LAB
ALBUMIN SERPL BCG-MCNC: 3.8 G/DL (ref 3.5–5.2)
ALP SERPL-CCNC: 220 U/L (ref 40–150)
ALT SERPL W P-5'-P-CCNC: 32 U/L (ref 0–70)
ANION GAP SERPL CALCULATED.3IONS-SCNC: 7 MMOL/L (ref 7–15)
AST SERPL W P-5'-P-CCNC: 35 U/L (ref 0–45)
BASOPHILS # BLD AUTO: 0 10E3/UL (ref 0–0.2)
BASOPHILS NFR BLD AUTO: 1 %
BILIRUB DIRECT SERPL-MCNC: <0.2 MG/DL (ref 0–0.3)
BILIRUB SERPL-MCNC: 0.6 MG/DL
BUN SERPL-MCNC: 26.6 MG/DL (ref 6–20)
CALCIUM SERPL-MCNC: 9.3 MG/DL (ref 8.8–10.4)
CHLORIDE SERPL-SCNC: 103 MMOL/L (ref 98–107)
CREAT SERPL-MCNC: 1.49 MG/DL (ref 0.67–1.17)
CYCLOSPORINE BLD LC/MS/MS-MCNC: 213 UG/L (ref 50–400)
EGFRCR SERPLBLD CKD-EPI 2021: 56 ML/MIN/1.73M2
EOSINOPHIL # BLD AUTO: 0.7 10E3/UL (ref 0–0.7)
EOSINOPHIL NFR BLD AUTO: 13 %
ERYTHROCYTE [DISTWIDTH] IN BLOOD BY AUTOMATED COUNT: 13.5 % (ref 10–15)
GLUCOSE SERPL-MCNC: 210 MG/DL (ref 70–99)
HCO3 SERPL-SCNC: 29 MMOL/L (ref 22–29)
HCT VFR BLD AUTO: 34.5 % (ref 40–53)
HGB BLD-MCNC: 11.1 G/DL (ref 13.3–17.7)
IMM GRANULOCYTES # BLD: 0 10E3/UL
IMM GRANULOCYTES NFR BLD: 0 %
LYMPHOCYTES # BLD AUTO: 1.2 10E3/UL (ref 0.8–5.3)
LYMPHOCYTES NFR BLD AUTO: 22 %
MAGNESIUM SERPL-MCNC: 1.8 MG/DL (ref 1.7–2.3)
MCH RBC QN AUTO: 30.7 PG (ref 26.5–33)
MCHC RBC AUTO-ENTMCNC: 32.2 G/DL (ref 31.5–36.5)
MCV RBC AUTO: 95 FL (ref 78–100)
MONOCYTES # BLD AUTO: 0.3 10E3/UL (ref 0–1.3)
MONOCYTES NFR BLD AUTO: 6 %
NEUTROPHILS # BLD AUTO: 3.1 10E3/UL (ref 1.6–8.3)
NEUTROPHILS NFR BLD AUTO: 58 %
PHOSPHATE SERPL-MCNC: 3.9 MG/DL (ref 2.5–4.5)
PLATELET # BLD AUTO: 171 10E3/UL (ref 150–450)
POTASSIUM SERPL-SCNC: 4.3 MMOL/L (ref 3.4–5.3)
PROT SERPL-MCNC: 6.4 G/DL (ref 6.4–8.3)
RBC # BLD AUTO: 3.62 10E6/UL (ref 4.4–5.9)
SODIUM SERPL-SCNC: 139 MMOL/L (ref 135–145)
TME LAST DOSE: NORMAL H
TME LAST DOSE: NORMAL H
WBC # BLD AUTO: 5.3 10E3/UL (ref 4–11)
WBC # BLD AUTO: 5.3 10E3/UL (ref 4–11)

## 2024-09-09 PROCEDURE — 36415 COLL VENOUS BLD VENIPUNCTURE: CPT

## 2024-09-09 PROCEDURE — 83735 ASSAY OF MAGNESIUM: CPT

## 2024-09-09 PROCEDURE — 80158 DRUG ASSAY CYCLOSPORINE: CPT

## 2024-09-09 PROCEDURE — 82248 BILIRUBIN DIRECT: CPT

## 2024-09-09 PROCEDURE — 84100 ASSAY OF PHOSPHORUS: CPT

## 2024-09-09 PROCEDURE — 85025 COMPLETE CBC W/AUTO DIFF WBC: CPT

## 2024-09-09 PROCEDURE — 80053 COMPREHEN METABOLIC PANEL: CPT

## 2024-09-09 PROCEDURE — 74019 RADEX ABDOMEN 2 VIEWS: CPT | Mod: TC | Performed by: STUDENT IN AN ORGANIZED HEALTH CARE EDUCATION/TRAINING PROGRAM

## 2024-09-10 DIAGNOSIS — Z94.4 S/P LIVER TRANSPLANT (H): ICD-10-CM

## 2024-09-10 LAB — CMV DNA SPEC NAA+PROBE-ACNC: NOT DETECTED IU/ML

## 2024-09-10 RX ORDER — CYCLOSPORINE 100 MG/1
200 CAPSULE, LIQUID FILLED ORAL EVERY 12 HOURS
Qty: 120 CAPSULE | Refills: 11 | Status: SHIPPED | OUTPATIENT
Start: 2024-09-10 | End: 2024-09-18

## 2024-09-10 RX ORDER — CYCLOSPORINE 25 MG/1
25 CAPSULE, LIQUID FILLED ORAL PRN
Qty: 180 CAPSULE | Refills: 3 | Status: SHIPPED | OUTPATIENT
Start: 2024-09-10 | End: 2024-09-18

## 2024-09-10 NOTE — TELEPHONE ENCOUNTER
ISSUE:   Cyclosporine level 213 on 9/9, goal 100-150, dose 225 mg BID.    PLAN:   Call Patient and confirm this was an accurate 12-hour trough.   Verify Cyclosporine dose .   Confirm no new medications or or missed doses.   Confirm no new illness / infection / diarrhea.   If accurate trough and accurate dose, decrease Cyclosporine dose to 200 mg BID         Repeat labs in 1 month  *If > 50% change in immunosuppression dose, repeat labs in 1 week.     OUTCOME:   Spoke with Patient, they confirm accurate trough level and current dose 225 mg BID.   Patient confirmed dose change to 200 mg BID.  Patient agreed to repeat labs in 1 months.   Orders sent to preferred pharmacy for dose change and lab for repeat labs.   Patient voiced understanding of plan.     Valentina Enamorado LPN

## 2024-09-12 ENCOUNTER — TELEPHONE (OUTPATIENT)
Dept: ENDOCRINOLOGY | Facility: CLINIC | Age: 54
End: 2024-09-12
Payer: COMMERCIAL

## 2024-09-12 NOTE — TELEPHONE ENCOUNTER
Left Voicemail (1st Attempt) for the patient to call back and schedule the following:    Appointment type: return diabetes & diabetes ed   Provider: Lizeth garcia   Return date: 2 months on or near November 2024 & next avail   Specialty phone number: [phone] & [phone]   Additional appointment(s) needed:   Additonal Notes: LVM, MyC x1   Check Out Comments from visit on 9/4:  -Appt with me in 2 months.  -Appt with Liana Garcia RD/JARROD- follow up    Quynh Vega on 9/12/2024 at 2:01 PM

## 2024-09-16 ENCOUNTER — TELEPHONE (OUTPATIENT)
Dept: TRANSPLANT | Facility: CLINIC | Age: 54
End: 2024-09-16

## 2024-09-16 ENCOUNTER — LAB (OUTPATIENT)
Dept: LAB | Facility: CLINIC | Age: 54
End: 2024-09-16
Payer: COMMERCIAL

## 2024-09-16 DIAGNOSIS — D70.9 NEUTROPENIA (H): ICD-10-CM

## 2024-09-16 DIAGNOSIS — Z94.4 LIVER REPLACED BY TRANSPLANT (H): ICD-10-CM

## 2024-09-16 LAB
ALBUMIN SERPL BCG-MCNC: 3.8 G/DL (ref 3.5–5.2)
ALP SERPL-CCNC: 177 U/L (ref 40–150)
ALT SERPL W P-5'-P-CCNC: 25 U/L (ref 0–70)
ANION GAP SERPL CALCULATED.3IONS-SCNC: 8 MMOL/L (ref 7–15)
AST SERPL W P-5'-P-CCNC: 36 U/L (ref 0–45)
BASOPHILS # BLD AUTO: 0 10E3/UL (ref 0–0.2)
BASOPHILS NFR BLD AUTO: 1 %
BILIRUB DIRECT SERPL-MCNC: 0.21 MG/DL (ref 0–0.3)
BILIRUB SERPL-MCNC: 0.6 MG/DL
BUN SERPL-MCNC: 20.3 MG/DL (ref 6–20)
CALCIUM SERPL-MCNC: 9.2 MG/DL (ref 8.8–10.4)
CHLORIDE SERPL-SCNC: 103 MMOL/L (ref 98–107)
CREAT SERPL-MCNC: 1.62 MG/DL (ref 0.67–1.17)
CYCLOSPORINE BLD LC/MS/MS-MCNC: 323 UG/L (ref 50–400)
EGFRCR SERPLBLD CKD-EPI 2021: 51 ML/MIN/1.73M2
EOSINOPHIL # BLD AUTO: 0.3 10E3/UL (ref 0–0.7)
EOSINOPHIL NFR BLD AUTO: 7 %
ERYTHROCYTE [DISTWIDTH] IN BLOOD BY AUTOMATED COUNT: 13 % (ref 10–15)
GLUCOSE SERPL-MCNC: 170 MG/DL (ref 70–99)
HCO3 SERPL-SCNC: 25 MMOL/L (ref 22–29)
HCT VFR BLD AUTO: 35.8 % (ref 40–53)
HGB BLD-MCNC: 12 G/DL (ref 13.3–17.7)
IMM GRANULOCYTES # BLD: 0 10E3/UL
IMM GRANULOCYTES NFR BLD: 0 %
LYMPHOCYTES # BLD AUTO: 0.5 10E3/UL (ref 0.8–5.3)
LYMPHOCYTES NFR BLD AUTO: 14 %
MAGNESIUM SERPL-MCNC: 1.6 MG/DL (ref 1.7–2.3)
MCH RBC QN AUTO: 30.6 PG (ref 26.5–33)
MCHC RBC AUTO-ENTMCNC: 33.5 G/DL (ref 31.5–36.5)
MCV RBC AUTO: 91 FL (ref 78–100)
MONOCYTES # BLD AUTO: 0.4 10E3/UL (ref 0–1.3)
MONOCYTES NFR BLD AUTO: 12 %
NEUTROPHILS # BLD AUTO: 2.5 10E3/UL (ref 1.6–8.3)
NEUTROPHILS NFR BLD AUTO: 67 %
PHOSPHATE SERPL-MCNC: 3.6 MG/DL (ref 2.5–4.5)
PLATELET # BLD AUTO: 165 10E3/UL (ref 150–450)
POTASSIUM SERPL-SCNC: 4.9 MMOL/L (ref 3.4–5.3)
PROT SERPL-MCNC: 6.6 G/DL (ref 6.4–8.3)
RBC # BLD AUTO: 3.92 10E6/UL (ref 4.4–5.9)
SODIUM SERPL-SCNC: 136 MMOL/L (ref 135–145)
TME LAST DOSE: NORMAL H
TME LAST DOSE: NORMAL H
WBC # BLD AUTO: 3.8 10E3/UL (ref 4–11)

## 2024-09-16 PROCEDURE — 82248 BILIRUBIN DIRECT: CPT

## 2024-09-16 PROCEDURE — 83735 ASSAY OF MAGNESIUM: CPT

## 2024-09-16 PROCEDURE — 85025 COMPLETE CBC W/AUTO DIFF WBC: CPT

## 2024-09-16 PROCEDURE — 36415 COLL VENOUS BLD VENIPUNCTURE: CPT

## 2024-09-16 PROCEDURE — 80158 DRUG ASSAY CYCLOSPORINE: CPT

## 2024-09-16 PROCEDURE — 80053 COMPREHEN METABOLIC PANEL: CPT

## 2024-09-16 PROCEDURE — 84100 ASSAY OF PHOSPHORUS: CPT

## 2024-09-16 NOTE — TELEPHONE ENCOUNTER
Left Voicemail (2nd Attempt) for the patient to call back and schedule the following:     Appointment type: return diabetes & diabetes ed   Provider: Lizeth garcia   Return date: 2 months on or near November 2024 & next avail   Specialty phone number: [phone] & [phone]   Additional appointment(s) needed:   Additonal Notes: LVM x2  Check Out Comments from visit on 9/4:  -Appt with me in 2 months.  -Appt with Liana Garcia RD/JESUSE- follow up

## 2024-09-17 ENCOUNTER — ANESTHESIA EVENT (OUTPATIENT)
Dept: SURGERY | Facility: CLINIC | Age: 54
DRG: 351 | End: 2024-09-17
Payer: COMMERCIAL

## 2024-09-17 DIAGNOSIS — Z94.4 S/P LIVER TRANSPLANT (H): ICD-10-CM

## 2024-09-17 NOTE — ANESTHESIA PREPROCEDURE EVALUATION
Anesthesia Pre-Procedure Evaluation    Patient: Mary Lopez   MRN: [id number] : 1970        Procedure : Procedure(s):  Open Inguinal Hernia Repair          Past Medical History:   Diagnosis Date    ANGEL (acute kidney injury) (H24)     Alcoholic cirrhosis of liver without ascites (H) 2023    Alcoholic hepatitis with ascites (H28) 10/03/2023    Alcoholic hepatitis without ascites (H28) 2023    CKD stage 3a, GFR 45-59 ml/min (H) 2024    Closed fracture of one rib of left side 2023    Concussion without loss of consciousness 2020    Decompensated hepatic cirrhosis (H) 09/15/2023    Diabetes mellitus, type 2 (H) 2023    Essential hypertension 2020    Ganglion cyst of wrist, right 2024    History of biliary duct stent placement 2024    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)     Liver replaced by transplant (H) 2024    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    ACR PRAJAPATI 6-7/, treated with steroids    Mild hyperlipidemia 2021    Persistent insomnia 2023    Portal hypertension (H) 2023    Right inguinal hernia 2024    Scrotal abscess     Secondary esophageal 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 (H) 2023    Tobacco abuse disorder 2020    Type 2 diabetes mellitus with hyperglycemia (H) 2023      Past Surgical History:   Procedure Laterality Date    BENCH LIVER  3/5/2024    Procedure: Bench liver;  Surgeon: Ryder Cortez MD;  Location: [facility]    CHOLECYSTECTOMY      COLONOSCOPY N/A 2024    Procedure: COLONOSCOPY, WITH POLYPECTOMY;  Surgeon: Jak Urbina MD;  Location: [facility]    CV RIGHT HEART CATH MEASUREMENTS RECORDED N/A 2024    Procedure: Right Heart Catheterization;  Surgeon: Alfred Tafoya MD;  Location:  [facility] CARDIAC CATH LAB    ENDOSCOPIC RETROGRADE CHOLANGIOPANCREATOGRAPHY, EXCHANGE TUBE/STENT N/A 2024    Procedure: Endoscopic Retrograde Cholangiopancreatography  with biliary sphincterotomy, balloon dilation, pancreatic stent placement,biliary stent exchange;  Surgeon: Naldo Rodriguez MD;  Location: [facility]    ENTEROSCOPY SMALL BOWEL N/A 2024    Procedure: Enteroscopy small bowel;  Surgeon: Hugo Daigle MD;  Location: [facility]    ESOPHAGOSCOPY, GASTROSCOPY, DUODENOSCOPY (EGD), COMBINED N/A 2024    Procedure: Esophagoscopy, gastroscopy, duodenoscopy (EGD), combined;  Surgeon: Hugo aDigle MD;  Location: [facility]    IR LIVER BIOPSY PERCUTANEOUS  3/15/2024    IR RETROPERITONEAL ABSCESS DRAINAGE  2024    TONSILLECTOMY      TRANSPLANT LIVER RECIPIENT  DONOR N/A 3/5/2024    Procedure: Transplant liver recipient  donor, bile duct stent placement;  Surgeon: Ryder Cortez MD;  Location: [facility]    VASECTOMY        Allergies   Allergen Reactions    Other Food Allergy Anaphylaxis     Legumes (black beans, baked beans, chickpeas)    Pineapple Itching      Social History     Tobacco Use    Smoking status: Former     Types: Cigarettes     Passive exposure: Never    Smokeless tobacco: Current     Types: Chew     Last attempt to quit: 2004    Tobacco comments:     Chew daily 1/8 of a tin per day   Substance Use Topics    Alcohol use: Not Currently     Alcohol/week: 12.0 standard drinks of alcohol     Types: 12 Standard drinks or equivalent per week     Comment: Sober since 2023      Wt Readings from Last 1 Encounters:   24 90.1 kg (198 lb 9 oz)        Anesthesia Evaluation   Pt has had prior anesthetic. Type: MAC and General.        ROS/MED HX  ENT/Pulmonary:     (+)                tobacco use, Past use,                       Neurologic:       Cardiovascular:     (+) Dyslipidemia hypertension- -   -  - -                                 Previous cardiac testing   Echo: Date: 2023 Results:  [facility]  Diagnostic and Treatment-3rd Floor  [address]  [place] [address]     Name: RENETTA LAI BLANCA  MRN: [id number]  : 1970  Study Date: 2023 12:28 PM  Age: 53 yrs  Gender: Male  Patient Location: [facility]  Reason For Study: Alcoholic cirrhosis of liver with ascites (H), Portal  hypertensi  Ordering Physician: JOSTIN ABRAHAM  Referring Physician: JOSTIN ABRAHAM  Performed By: [facility]     BSA: 2.2 m2  Height: 69 in  Weight: 230 lb  HR: 94  BP: 118/68 mmHg  ______________________________________________________________________________  Procedure  Echocardiogram with two-dimensional, color and spectral Doppler performed.  ______________________________________________________________________________  Interpretation Summary  Global and regional left ventricular function is normal with an EF of 60-65%.  Global right ventricular function is normal.  Mild tricuspid and mitral insufficiency present.  The right ventricular systolic pressure is approximated at 37 mmHg (upper  limits of normal).  Estimated mean right atrial pressure is normal.  Trivial pericardial effusion is present.  Ascites is noted.  ______________________________________________________________________________  Left Ventricle  Left ventricular size is normal. Left ventricular wall thickness is normal.  Global and regional left ventricular function is normal with an EF of 60-65%.  Left ventricular diastolic function is indeterminate. No regional wall motion  abnormalities are seen.     Right Ventricle  The right ventricle is normal size. Global right ventricular function is  normal.     Atria  The right atria appears normal. Mild left atrial enlargement is present. The  atrial septum is intact as assessed by color Doppler .     Mitral Valve  Mild mitral insufficiency is present.     Aortic Valve  Aortic valve is normal in structure and function.     Tricuspid Valve  Mild tricuspid insufficiency is present. The right ventricular systolic  pressure is approximated at 33.6 mmHg  plus the right atrial pressure.     Pulmonic Valve  The pulmonic valve is normal.     Vessels  The aorta root is normal. The inferior vena cava is normal. Estimated mean  right atrial pressure is normal.     Pericardium  Trivial pericardial effusion is present.     Miscellaneous  Ascites is noted.     Compared to Previous Study  Previous study not available for comparison.  ______________________________________________________________________________  MMode/2D Measurements & Calculations  IVSd: 0.93 cm  LVIDd: 5.8 cm  LVIDs: 3.9 cm  LVPWd: 0.84 cm  FS: 32.6 %  LV mass(C)d: 199.3 grams  LV mass(C)dI: 90.9 grams/m2  Ao root diam: 3.7 cm  LA dimension: 4.0 cm  asc Aorta Diam: 3.5 cm  LA/Ao: 1.1  LVOT diam: 2.4 cm  LVOT area: 4.7 cm2  Ao root diam index Ht(cm/m): 2.1  Ao root diam index BSA (cm/m2): 1.7  Asc Ao diam index BSA (cm/m2): 1.6  Asc Ao diam index Ht(cm/m): 2.0  LA Volume (BP): 86.2 ml     LA Volume Index (BP): 39.4 ml/m2  RV Base: 4.5 cm  RWT: 0.29  TAPSE: 2.5 cm     Doppler Measurements & Calculations  MV E max kristopher: 92.2 cm/sec  MV A max kristopher: 81.9 cm/sec  MV E/A: 1.1  MV max P.9 mmHg  MV mean P.3 mmHg  MV V2 VTI: 25.2 cm  MVA(VTI): 5.1 cm2  MV dec time: 0.16 sec  Ao V2 max: 173.7 cm/sec  Ao max P.1 mmHg  Ao V2 mean: 121.0 cm/sec  Ao mean P.7 mmHg  Ao V2 VTI: 35.2 cm  DRAKE(I,D): 3.6 cm2  DRAKE(V,D): 4.4 cm2  LV V1 max PG: 10.3 mmHg  LV V1 max: 160.6 cm/sec  LV V1 VTI: 27.0 cm  SV(LVOT): 127.4 ml  SI(LVOT): 58.1 ml/m2     PA V2 max: 109.7 cm/sec  PA max P.9 mmHg  PA acc time: 0.09 sec  TR max kristopher: 289.9 cm/sec  TR max P.6 mmHg  AV Kristopher Ratio (DI): 0.92  DRAKE Index (cm2/m2): 1.7  E/E' av.4  Lateral E/e': 7.1  Medial E/e': 7.7  RV S Kristopher: 22.3 cm/sec     ______________________________________________________________________________  Report approved by: Rosendo Thorpe 2023 01:11 PM    Stress Test:  Date: Results:    ECG Reviewed:  Date: Results:    Cath:  Date: Results:     "  METS/Exercise Tolerance:     Hematologic: Comments: Thrombocytopenia    (+)      anemia,          Musculoskeletal:       GI/Hepatic: Comment: Pt had a liver transplant March 2024    (+)   esophageal disease, Varices,       hepatitis type Alcoholic, liver disease,       Renal/Genitourinary: Comment: acute kidney injury hx    (+)  type: ARF,         (-) renal disease   Endo: Comment: BMI: 31.22    (+) type I DM, type II DM, Last HgA1c: 7.7, date: 12/19/2023, Using insulin,          Obesity,       Psychiatric/Substance Use: Comment: Alcohol use disorder, severe, in early remission        (+)   alcohol abuse (sober from alcohol since June 25, 2023)      Infectious Disease:       Malignancy:       Other:            Physical Exam    Airway        Mallampati: II   TM distance: > 3 FB   Neck ROM: full   Mouth opening: > 3 cm    Respiratory Devices and Support         Dental     Comment: Wnl          Cardiovascular          Rhythm and rate: regular and normal     Pulmonary           breath sounds clear to auscultation           OUTSIDE LABS:  CBC:   Lab Results   Component Value Date    WBC 3.8 (L) 09/16/2024    WBC 5.3 09/09/2024    WBC 5.3 09/09/2024    HGB 12.0 (L) 09/16/2024    HGB 11.1 (L) 09/09/2024    HCT 35.8 (L) 09/16/2024    HCT 34.5 (L) 09/09/2024     09/16/2024     09/09/2024     BMP:   Lab Results   Component Value Date     09/16/2024     09/09/2024    POTASSIUM 4.9 09/16/2024    POTASSIUM 4.3 09/09/2024    CHLORIDE 103 09/16/2024    CHLORIDE 103 09/09/2024    CO2 25 09/16/2024    CO2 29 09/09/2024    BUN 20.3 (H) 09/16/2024    BUN 26.6 (H) 09/09/2024    CR 1.62 (H) 09/16/2024    CR 1.49 (H) 09/09/2024     (H) 09/16/2024     (H) 09/09/2024     COAGS:   Lab Results   Component Value Date    PTT 33 03/31/2024    INR 1.12 08/13/2024    FIBR 310 03/31/2024     POC: No results found for: \"BGM\", \"HCG\", \"HCGS\"  HEPATIC:   Lab Results   Component Value Date    ALBUMIN 3.8 " 09/16/2024    PROTTOTAL 6.6 09/16/2024    ALT 25 09/16/2024    AST 36 09/16/2024    ALKPHOS 177 (H) 09/16/2024    BILITOTAL 0.6 09/16/2024    JAQUELINE 27 03/09/2024     OTHER:   Lab Results   Component Value Date    PH 7.45 03/07/2024    LACT 0.8 03/30/2024    A1C 7.3 (H) 06/21/2024    MEG 9.2 09/16/2024    PHOS 3.6 09/16/2024    MAG 1.6 (L) 09/16/2024    LIPASE 10 (L) 08/13/2024    AMYLASE 34 08/13/2024    TSH 2.18 03/12/2022       Anesthesia Plan    ASA Status:  3    NPO Status:  Will be NPO Appropriate at ...    Anesthesia Type: General.     - Airway: ETT   Induction: Intravenous.   Maintenance: Balanced.   Techniques and Equipment:     - Lines/Monitors: 2nd IV, BIS     Consents    Anesthesia Plan(s) and associated risks, benefits, and realistic alternatives discussed. Questions answered and patient/representative(s) expressed understanding.     - Discussed:     - Discussed with:  Patient            Postoperative Care    Pain management: Multi-modal analgesia, IV analgesics, Oral pain medications.   PONV prophylaxis: Ondansetron (or other 5HT-3), Dexamethasone or Solumedrol     Comments:    Other Comments: NPO. Meds reviewed. No problems with anesthesia in past. No interval changes since ERCP one month ago.            Emerson Way MD    I have reviewed the pertinent notes and labs in the chart from the past 30 days and (re)examined the patient.  Any updates or changes from those notes are reflected in this note.        # Hypomagnesemia: Lowest Mg = 1.6 mg/dL in last 2 days, will replace as needed       # DMII: A1C = 7.3 % (Ref range: 0.0 - 5.6 %) within past 6 months

## 2024-09-17 NOTE — TELEPHONE ENCOUNTER
ISSUE:   Tacrolimus IR level 323 on 9/17, goal 150-200, dose 200 mg BID.    PLAN:   Call Patient and confirm this was an accurate 12-hour trough.   Verify Cyclosporine dose.  Confirm no new medications or or missed doses.   Confirm no new illness / infection / diarrhea.   If accurate trough and accurate dose, decrease Cyclosporine dose to 150 mg BID     Is this more than a 50% increase or decrease in current IS dose: No      Getting hernia repair tomorrow.  Repeat labs on Monday.  Lore Mckeon RN     OUTCOME:   Spoke with Rosio, they confirm accurate trough level and current dose 200 mg BID.   Rosio confirmed dose change to 150 mg BID.  Rosio agreed to repeat labs on Monday.  .   Orders sent to preferred pharmacy for dose change and lab for repeat labs.   Rosio voiced understanding of plan.      Valentina Enamorado LPN

## 2024-09-18 ENCOUNTER — ANESTHESIA (OUTPATIENT)
Dept: SURGERY | Facility: CLINIC | Age: 54
DRG: 351 | End: 2024-09-18
Payer: COMMERCIAL

## 2024-09-18 ENCOUNTER — HOSPITAL ENCOUNTER (OUTPATIENT)
Facility: CLINIC | Age: 54
Discharge: HOME | DRG: 351 | End: 2024-09-18
Attending: TRANSPLANT SURGERY | Admitting: TRANSPLANT SURGERY
Payer: COMMERCIAL

## 2024-09-18 VITALS
DIASTOLIC BLOOD PRESSURE: 87 MMHG | HEART RATE: 67 BPM | WEIGHT: 197.97 LBS | RESPIRATION RATE: 20 BRPM | BODY MASS INDEX: 28.34 KG/M2 | HEIGHT: 70 IN | OXYGEN SATURATION: 97 % | SYSTOLIC BLOOD PRESSURE: 124 MMHG | TEMPERATURE: 98 F

## 2024-09-18 DIAGNOSIS — K40.90 INGUINAL HERNIA WITHOUT OBSTRUCTION OR GANGRENE, RECURRENCE NOT SPECIFIED, UNSPECIFIED LATERALITY: ICD-10-CM

## 2024-09-18 DIAGNOSIS — Z98.890 HISTORY OF BILIARY DUCT STENT PLACEMENT: Primary | ICD-10-CM

## 2024-09-18 LAB
GLUCOSE BLDC GLUCOMTR-MCNC: 211 MG/DL (ref 70–99)
GLUCOSE BLDC GLUCOMTR-MCNC: 218 MG/DL (ref 70–99)
GLUCOSE BLDC GLUCOMTR-MCNC: 221 MG/DL (ref 70–99)
GLUCOSE BLDC GLUCOMTR-MCNC: 235 MG/DL (ref 70–99)

## 2024-09-18 PROCEDURE — 0YU50JZ SUPPLEMENT RIGHT INGUINAL REGION WITH SYNTHETIC SUBSTITUTE, OPEN APPROACH: ICD-10-PCS | Performed by: TRANSPLANT SURGERY

## 2024-09-18 PROCEDURE — 258N000003 HC RX IP 258 OP 636: Performed by: ANESTHESIOLOGY

## 2024-09-18 PROCEDURE — 250N000011 HC RX IP 250 OP 636

## 2024-09-18 PROCEDURE — 88302 TISSUE EXAM BY PATHOLOGIST: CPT | Mod: TC | Performed by: TRANSPLANT SURGERY

## 2024-09-18 PROCEDURE — 272N000001 HC OR GENERAL SUPPLY STERILE: Performed by: TRANSPLANT SURGERY

## 2024-09-18 PROCEDURE — 710N000010 HC RECOVERY PHASE 1, LEVEL 2, PER MIN: Performed by: TRANSPLANT SURGERY

## 2024-09-18 PROCEDURE — 49505 PRP I/HERN INIT REDUC >5 YR: CPT | Performed by: NURSE ANESTHETIST, CERTIFIED REGISTERED

## 2024-09-18 PROCEDURE — 49505 PRP I/HERN INIT REDUC >5 YR: CPT | Performed by: ANESTHESIOLOGY

## 2024-09-18 PROCEDURE — C9290 INJ, BUPIVACAINE LIPOSOME: HCPCS | Performed by: ANESTHESIOLOGY

## 2024-09-18 PROCEDURE — 88302 TISSUE EXAM BY PATHOLOGIST: CPT | Mod: 26 | Performed by: STUDENT IN AN ORGANIZED HEALTH CARE EDUCATION/TRAINING PROGRAM

## 2024-09-18 PROCEDURE — 258N000003 HC RX IP 258 OP 636

## 2024-09-18 PROCEDURE — 999N000141 HC STATISTIC PRE-PROCEDURE NURSING ASSESSMENT: Performed by: TRANSPLANT SURGERY

## 2024-09-18 PROCEDURE — 250N000009 HC RX 250: Performed by: ANESTHESIOLOGY

## 2024-09-18 PROCEDURE — C1781 MESH (IMPLANTABLE): HCPCS | Performed by: TRANSPLANT SURGERY

## 2024-09-18 PROCEDURE — 250N000011 HC RX IP 250 OP 636: Performed by: ANESTHESIOLOGY

## 2024-09-18 PROCEDURE — 360N000075 HC SURGERY LEVEL 2, PER MIN: Performed by: TRANSPLANT SURGERY

## 2024-09-18 PROCEDURE — 250N000013 HC RX MED GY IP 250 OP 250 PS 637: Performed by: STUDENT IN AN ORGANIZED HEALTH CARE EDUCATION/TRAINING PROGRAM

## 2024-09-18 PROCEDURE — 250N000025 HC SEVOFLURANE, PER MIN: Performed by: TRANSPLANT SURGERY

## 2024-09-18 PROCEDURE — 370N000017 HC ANESTHESIA TECHNICAL FEE, PER MIN: Performed by: TRANSPLANT SURGERY

## 2024-09-18 PROCEDURE — 710N000012 HC RECOVERY PHASE 2, PER MINUTE: Performed by: TRANSPLANT SURGERY

## 2024-09-18 PROCEDURE — 82962 GLUCOSE BLOOD TEST: CPT

## 2024-09-18 PROCEDURE — 250N000011 HC RX IP 250 OP 636: Performed by: TRANSPLANT SURGERY

## 2024-09-18 DEVICE — POLYPROPYLENE NON-ABSORBABLE SYNTHETIC SURGICAL MESH
Type: IMPLANTABLE DEVICE | Site: GROIN | Status: FUNCTIONAL
Brand: PROLENE

## 2024-09-18 RX ORDER — PROCHLORPERAZINE MALEATE 5 MG
10 TABLET ORAL EVERY 6 HOURS PRN
Status: DISCONTINUED | OUTPATIENT
Start: 2024-09-18 | End: 2024-09-18 | Stop reason: HOSPADM

## 2024-09-18 RX ORDER — NICOTINE POLACRILEX 4 MG
15-30 LOZENGE BUCCAL
Status: DISCONTINUED | OUTPATIENT
Start: 2024-09-18 | End: 2024-09-18 | Stop reason: HOSPADM

## 2024-09-18 RX ORDER — DEXAMETHASONE SODIUM PHOSPHATE 4 MG/ML
4 INJECTION, SOLUTION INTRA-ARTICULAR; INTRALESIONAL; INTRAMUSCULAR; INTRAVENOUS; SOFT TISSUE
Status: DISCONTINUED | OUTPATIENT
Start: 2024-09-18 | End: 2024-09-18 | Stop reason: HOSPADM

## 2024-09-18 RX ORDER — FENTANYL CITRATE 50 UG/ML
25-50 INJECTION, SOLUTION INTRAMUSCULAR; INTRAVENOUS
Status: DISCONTINUED | OUTPATIENT
Start: 2024-09-18 | End: 2024-09-18 | Stop reason: HOSPADM

## 2024-09-18 RX ORDER — NALOXONE HYDROCHLORIDE 0.4 MG/ML
0.4 INJECTION, SOLUTION INTRAMUSCULAR; INTRAVENOUS; SUBCUTANEOUS
Status: DISCONTINUED | OUTPATIENT
Start: 2024-09-18 | End: 2024-09-18 | Stop reason: HOSPADM

## 2024-09-18 RX ORDER — PROPOFOL 10 MG/ML
INJECTION, EMULSION INTRAVENOUS PRN
Status: DISCONTINUED | OUTPATIENT
Start: 2024-09-18 | End: 2024-09-18

## 2024-09-18 RX ORDER — ONDANSETRON 4 MG/1
4 TABLET, ORALLY DISINTEGRATING ORAL EVERY 30 MIN PRN
Status: DISCONTINUED | OUTPATIENT
Start: 2024-09-18 | End: 2024-09-18 | Stop reason: HOSPADM

## 2024-09-18 RX ORDER — HYDROMORPHONE HCL IN WATER/PF 6 MG/30 ML
0.2 PATIENT CONTROLLED ANALGESIA SYRINGE INTRAVENOUS EVERY 5 MIN PRN
Status: DISCONTINUED | OUTPATIENT
Start: 2024-09-18 | End: 2024-09-18 | Stop reason: HOSPADM

## 2024-09-18 RX ORDER — ACETAMINOPHEN 325 MG/1
975 TABLET ORAL EVERY 8 HOURS
Status: DISCONTINUED | OUTPATIENT
Start: 2024-09-18 | End: 2024-09-18 | Stop reason: HOSPADM

## 2024-09-18 RX ORDER — LIDOCAINE HYDROCHLORIDE 20 MG/ML
INJECTION, SOLUTION INFILTRATION; PERINEURAL PRN
Status: DISCONTINUED | OUTPATIENT
Start: 2024-09-18 | End: 2024-09-18

## 2024-09-18 RX ORDER — SODIUM CHLORIDE, SODIUM LACTATE, POTASSIUM CHLORIDE, CALCIUM CHLORIDE 600; 310; 30; 20 MG/100ML; MG/100ML; MG/100ML; MG/100ML
INJECTION, SOLUTION INTRAVENOUS CONTINUOUS
Status: DISCONTINUED | OUTPATIENT
Start: 2024-09-18 | End: 2024-09-18 | Stop reason: HOSPADM

## 2024-09-18 RX ORDER — HYDROMORPHONE HCL IN WATER/PF 6 MG/30 ML
0.4 PATIENT CONTROLLED ANALGESIA SYRINGE INTRAVENOUS EVERY 5 MIN PRN
Status: DISCONTINUED | OUTPATIENT
Start: 2024-09-18 | End: 2024-09-18 | Stop reason: HOSPADM

## 2024-09-18 RX ORDER — FENTANYL CITRATE 50 UG/ML
50 INJECTION, SOLUTION INTRAMUSCULAR; INTRAVENOUS EVERY 5 MIN PRN
Status: DISCONTINUED | OUTPATIENT
Start: 2024-09-18 | End: 2024-09-18 | Stop reason: HOSPADM

## 2024-09-18 RX ORDER — DEXAMETHASONE SODIUM PHOSPHATE 4 MG/ML
INJECTION, SOLUTION INTRA-ARTICULAR; INTRALESIONAL; INTRAMUSCULAR; INTRAVENOUS; SOFT TISSUE PRN
Status: DISCONTINUED | OUTPATIENT
Start: 2024-09-18 | End: 2024-09-18

## 2024-09-18 RX ORDER — NALOXONE HYDROCHLORIDE 0.4 MG/ML
0.1 INJECTION, SOLUTION INTRAMUSCULAR; INTRAVENOUS; SUBCUTANEOUS
Status: DISCONTINUED | OUTPATIENT
Start: 2024-09-18 | End: 2024-09-18 | Stop reason: HOSPADM

## 2024-09-18 RX ORDER — SODIUM CHLORIDE, SODIUM GLUCONATE, SODIUM ACETATE, POTASSIUM CHLORIDE AND MAGNESIUM CHLORIDE 526; 502; 368; 37; 30 MG/100ML; MG/100ML; MG/100ML; MG/100ML; MG/100ML
INJECTION, SOLUTION INTRAVENOUS CONTINUOUS PRN
Status: DISCONTINUED | OUTPATIENT
Start: 2024-09-18 | End: 2024-09-18

## 2024-09-18 RX ORDER — CYCLOSPORINE 100 MG/1
100 CAPSULE, LIQUID FILLED ORAL EVERY 12 HOURS
Qty: 60 CAPSULE | Refills: 11 | Status: SHIPPED | OUTPATIENT
Start: 2024-09-18 | End: 2024-09-27

## 2024-09-18 RX ORDER — OXYCODONE HYDROCHLORIDE 5 MG/1
5 TABLET ORAL EVERY 6 HOURS PRN
Qty: 12 TABLET | Refills: 0 | Status: SHIPPED | OUTPATIENT
Start: 2024-09-18 | End: 2024-09-21

## 2024-09-18 RX ORDER — OXYCODONE HYDROCHLORIDE 10 MG/1
10 TABLET ORAL EVERY 4 HOURS PRN
Status: DISCONTINUED | OUTPATIENT
Start: 2024-09-18 | End: 2024-09-18 | Stop reason: HOSPADM

## 2024-09-18 RX ORDER — OXYCODONE HYDROCHLORIDE 5 MG/1
5 TABLET ORAL
Status: DISCONTINUED | OUTPATIENT
Start: 2024-09-18 | End: 2024-09-18 | Stop reason: HOSPADM

## 2024-09-18 RX ORDER — FENTANYL CITRATE 50 UG/ML
25 INJECTION, SOLUTION INTRAMUSCULAR; INTRAVENOUS EVERY 5 MIN PRN
Status: DISCONTINUED | OUTPATIENT
Start: 2024-09-18 | End: 2024-09-18 | Stop reason: HOSPADM

## 2024-09-18 RX ORDER — VANCOMYCIN HYDROCHLORIDE 1 G/200ML
1000 INJECTION, SOLUTION INTRAVENOUS
Status: COMPLETED | OUTPATIENT
Start: 2024-09-18 | End: 2024-09-18

## 2024-09-18 RX ORDER — NALOXONE HYDROCHLORIDE 0.4 MG/ML
0.2 INJECTION, SOLUTION INTRAMUSCULAR; INTRAVENOUS; SUBCUTANEOUS
Status: DISCONTINUED | OUTPATIENT
Start: 2024-09-18 | End: 2024-09-18 | Stop reason: HOSPADM

## 2024-09-18 RX ORDER — FLUMAZENIL 0.1 MG/ML
0.2 INJECTION, SOLUTION INTRAVENOUS
Status: DISCONTINUED | OUTPATIENT
Start: 2024-09-18 | End: 2024-09-18 | Stop reason: HOSPADM

## 2024-09-18 RX ORDER — METHOCARBAMOL 750 MG/1
750 TABLET, FILM COATED ORAL EVERY 6 HOURS PRN
Status: DISCONTINUED | OUTPATIENT
Start: 2024-09-18 | End: 2024-09-18 | Stop reason: HOSPADM

## 2024-09-18 RX ORDER — ONDANSETRON 2 MG/ML
4 INJECTION INTRAMUSCULAR; INTRAVENOUS EVERY 6 HOURS PRN
Status: DISCONTINUED | OUTPATIENT
Start: 2024-09-18 | End: 2024-09-18 | Stop reason: HOSPADM

## 2024-09-18 RX ORDER — ONDANSETRON 4 MG/1
4 TABLET, ORALLY DISINTEGRATING ORAL EVERY 6 HOURS PRN
Status: DISCONTINUED | OUTPATIENT
Start: 2024-09-18 | End: 2024-09-18 | Stop reason: HOSPADM

## 2024-09-18 RX ORDER — POLYETHYLENE GLYCOL 3350 17 G/17G
17 POWDER, FOR SOLUTION ORAL DAILY
Status: DISCONTINUED | OUTPATIENT
Start: 2024-09-19 | End: 2024-09-18 | Stop reason: HOSPADM

## 2024-09-18 RX ORDER — OXYCODONE HYDROCHLORIDE 10 MG/1
10 TABLET ORAL
Status: DISCONTINUED | OUTPATIENT
Start: 2024-09-18 | End: 2024-09-18 | Stop reason: HOSPADM

## 2024-09-18 RX ORDER — DEXTROSE MONOHYDRATE 25 G/50ML
25-50 INJECTION, SOLUTION INTRAVENOUS
Status: DISCONTINUED | OUTPATIENT
Start: 2024-09-18 | End: 2024-09-18 | Stop reason: HOSPADM

## 2024-09-18 RX ORDER — CYCLOSPORINE 25 MG/1
50 CAPSULE, LIQUID FILLED ORAL EVERY 12 HOURS
Qty: 180 CAPSULE | Refills: 3 | Status: SHIPPED | OUTPATIENT
Start: 2024-09-18 | End: 2024-09-27

## 2024-09-18 RX ORDER — OXYCODONE HYDROCHLORIDE 5 MG/1
5 TABLET ORAL EVERY 4 HOURS PRN
Status: DISCONTINUED | OUTPATIENT
Start: 2024-09-18 | End: 2024-09-18 | Stop reason: HOSPADM

## 2024-09-18 RX ORDER — ONDANSETRON 2 MG/ML
INJECTION INTRAMUSCULAR; INTRAVENOUS PRN
Status: DISCONTINUED | OUTPATIENT
Start: 2024-09-18 | End: 2024-09-18

## 2024-09-18 RX ORDER — FENTANYL CITRATE 50 UG/ML
INJECTION, SOLUTION INTRAMUSCULAR; INTRAVENOUS PRN
Status: DISCONTINUED | OUTPATIENT
Start: 2024-09-18 | End: 2024-09-18

## 2024-09-18 RX ORDER — ONDANSETRON 2 MG/ML
4 INJECTION INTRAMUSCULAR; INTRAVENOUS EVERY 30 MIN PRN
Status: DISCONTINUED | OUTPATIENT
Start: 2024-09-18 | End: 2024-09-18 | Stop reason: HOSPADM

## 2024-09-18 RX ORDER — BUPIVACAINE HYDROCHLORIDE 2.5 MG/ML
INJECTION, SOLUTION EPIDURAL; INFILTRATION; INTRACAUDAL
Status: COMPLETED | OUTPATIENT
Start: 2024-09-18 | End: 2024-09-18

## 2024-09-18 RX ORDER — BISACODYL 10 MG
10 SUPPOSITORY, RECTAL RECTAL DAILY PRN
Status: DISCONTINUED | OUTPATIENT
Start: 2024-09-21 | End: 2024-09-18 | Stop reason: HOSPADM

## 2024-09-18 RX ORDER — PIPERACILLIN SODIUM, TAZOBACTAM SODIUM 2; .25 G/10ML; G/10ML
2.25 INJECTION, POWDER, LYOPHILIZED, FOR SOLUTION INTRAVENOUS ONCE
Status: COMPLETED | OUTPATIENT
Start: 2024-09-18 | End: 2024-09-18

## 2024-09-18 RX ORDER — AMOXICILLIN 250 MG
1 CAPSULE ORAL 2 TIMES DAILY
Status: DISCONTINUED | OUTPATIENT
Start: 2024-09-18 | End: 2024-09-18 | Stop reason: HOSPADM

## 2024-09-18 RX ADMIN — OXYCODONE HYDROCHLORIDE 5 MG: 5 TABLET ORAL at 12:53

## 2024-09-18 RX ADMIN — PHENYLEPHRINE HYDROCHLORIDE 100 MCG: 10 INJECTION INTRAVENOUS at 08:28

## 2024-09-18 RX ADMIN — BUPIVACAINE HYDROCHLORIDE 10 ML: 2.5 INJECTION, SOLUTION EPIDURAL; INFILTRATION; INTRACAUDAL; PERINEURAL at 07:10

## 2024-09-18 RX ADMIN — HYDROMORPHONE HYDROCHLORIDE 0.5 MG: 1 INJECTION, SOLUTION INTRAMUSCULAR; INTRAVENOUS; SUBCUTANEOUS at 08:38

## 2024-09-18 RX ADMIN — SUGAMMADEX 200 MG: 100 INJECTION, SOLUTION INTRAVENOUS at 09:50

## 2024-09-18 RX ADMIN — Medication 20 MG: at 08:49

## 2024-09-18 RX ADMIN — PROPOFOL 200 MG: 10 INJECTION, EMULSION INTRAVENOUS at 07:50

## 2024-09-18 RX ADMIN — PIPERACILLIN AND TAZOBACTAM 2.25 G: 2; .25 INJECTION, POWDER, FOR SOLUTION INTRAVENOUS at 07:35

## 2024-09-18 RX ADMIN — MIDAZOLAM 1 MG: 1 INJECTION INTRAMUSCULAR; INTRAVENOUS at 07:12

## 2024-09-18 RX ADMIN — HYDROMORPHONE HYDROCHLORIDE 0.5 MG: 1 INJECTION, SOLUTION INTRAMUSCULAR; INTRAVENOUS; SUBCUTANEOUS at 09:13

## 2024-09-18 RX ADMIN — LIDOCAINE HYDROCHLORIDE 100 MG: 20 INJECTION, SOLUTION INFILTRATION; PERINEURAL at 07:50

## 2024-09-18 RX ADMIN — Medication 50 MG: at 07:51

## 2024-09-18 RX ADMIN — FENTANYL CITRATE 25 MCG: 50 INJECTION, SOLUTION INTRAMUSCULAR; INTRAVENOUS at 11:07

## 2024-09-18 RX ADMIN — VANCOMYCIN HYDROCHLORIDE 1000 MG: 1 INJECTION, SOLUTION INTRAVENOUS at 07:35

## 2024-09-18 RX ADMIN — SODIUM CHLORIDE, SODIUM GLUCONATE, SODIUM ACETATE, POTASSIUM CHLORIDE AND MAGNESIUM CHLORIDE: 526; 502; 368; 37; 30 INJECTION, SOLUTION INTRAVENOUS at 08:00

## 2024-09-18 RX ADMIN — SODIUM CHLORIDE, POTASSIUM CHLORIDE, SODIUM LACTATE AND CALCIUM CHLORIDE: 600; 310; 30; 20 INJECTION, SOLUTION INTRAVENOUS at 07:33

## 2024-09-18 RX ADMIN — FENTANYL CITRATE 25 MCG: 50 INJECTION, SOLUTION INTRAMUSCULAR; INTRAVENOUS at 10:48

## 2024-09-18 RX ADMIN — FENTANYL CITRATE 50 MCG: 50 INJECTION, SOLUTION INTRAMUSCULAR; INTRAVENOUS at 07:12

## 2024-09-18 RX ADMIN — BUPIVACAINE 10 ML: 13.3 INJECTION, SUSPENSION, LIPOSOMAL INFILTRATION at 07:10

## 2024-09-18 RX ADMIN — PHENYLEPHRINE HYDROCHLORIDE 100 MCG: 10 INJECTION INTRAVENOUS at 08:10

## 2024-09-18 RX ADMIN — FENTANYL CITRATE 50 MCG: 50 INJECTION, SOLUTION INTRAMUSCULAR; INTRAVENOUS at 10:34

## 2024-09-18 RX ADMIN — ONDANSETRON 4 MG: 2 INJECTION INTRAMUSCULAR; INTRAVENOUS at 09:49

## 2024-09-18 RX ADMIN — DEXAMETHASONE SODIUM PHOSPHATE 4 MG: 4 INJECTION, SOLUTION INTRA-ARTICULAR; INTRALESIONAL; INTRAMUSCULAR; INTRAVENOUS; SOFT TISSUE at 08:07

## 2024-09-18 RX ADMIN — FENTANYL CITRATE 100 MCG: 50 INJECTION INTRAMUSCULAR; INTRAVENOUS at 07:50

## 2024-09-18 RX ADMIN — HYDROMORPHONE HYDROCHLORIDE 0.2 MG: 0.2 INJECTION, SOLUTION INTRAMUSCULAR; INTRAVENOUS; SUBCUTANEOUS at 11:45

## 2024-09-18 RX ADMIN — MIDAZOLAM 1 MG: 1 INJECTION INTRAMUSCULAR; INTRAVENOUS at 07:08

## 2024-09-18 RX ADMIN — FENTANYL CITRATE 50 MCG: 50 INJECTION, SOLUTION INTRAMUSCULAR; INTRAVENOUS at 07:08

## 2024-09-18 ASSESSMENT — LIFESTYLE VARIABLES: TOBACCO_USE: 1

## 2024-09-18 ASSESSMENT — ACTIVITIES OF DAILY LIVING (ADL)
ADLS_ACUITY_SCORE: 38
ADLS_ACUITY_SCORE: 38
ADLS_ACUITY_SCORE: 37
ADLS_ACUITY_SCORE: 37
ADLS_ACUITY_SCORE: 35
ADLS_ACUITY_SCORE: 38
ADLS_ACUITY_SCORE: 37
ADLS_ACUITY_SCORE: 37

## 2024-09-18 ASSESSMENT — ENCOUNTER SYMPTOMS: NEW SYMPTOMS OF CORONARY ARTERY DISEASE: 0

## 2024-09-18 NOTE — ANESTHESIA POSTPROCEDURE EVALUATION
Patient: Mary Lopez    Procedure: Procedure(s):  Open Inguinal Hernia Repair       Anesthesia Type:  General    Note:  Disposition: Outpatient   Postop Pain Control: Uneventful            Sign Out: Well controlled pain   PONV: No   Neuro/Psych: Uneventful            Sign Out: Acceptable/Baseline neuro status   Airway/Respiratory: Uneventful            Sign Out: Acceptable/Baseline resp. status   CV/Hemodynamics: Uneventful            Sign Out: Acceptable CV status; No obvious hypovolemia; No obvious fluid overload   Other NRE: NONE   DID A NON-ROUTINE EVENT OCCUR? No           Last vitals:  Vitals Value Taken Time   /85 09/18/24 1145   Temp 37  C (98.6  F) 09/18/24 1145   Pulse 62 09/18/24 1156   Resp 15 09/18/24 1156   SpO2 98 % 09/18/24 1156   Vitals shown include unfiled device data.    Electronically Signed By: Lawrence Herrera MD  September 18, 2024  11:57 AM

## 2024-09-18 NOTE — BRIEF OP NOTE
[facility] Brief Operative Note    Pre-operative diagnosis: Hernia, inguinal [K40.90]   Post-operative diagnosis * No post-op diagnosis entered *  right inguinal hernia   Procedure: Procedure(s):  Open Inguinal Hernia Repair   Surgeon(s): Surgeons and Role:     * Ryder Cortez MD - Primary     * Estiven Martel MD - Fellow - Assisting   Estimated blood loss: * No values recorded between 9/18/2024  8:20 AM and 9/18/2024 10:05 AM *    Specimens: ID Type Source Tests Collected by Time Destination   1 : hernia sac Tissue Hernia Sac SURGICAL PATHOLOGY EXAM Ryder Cortez MD 9/18/2024  9:00 AM       Findings: Open right inguinal hernia repair with permanent mesh

## 2024-09-18 NOTE — OR NURSING
Patient received right side Ilioinguinal/Iliohyprogastric nerve block in pre-op, tolerated well with sedation.

## 2024-09-18 NOTE — ANESTHESIA PROCEDURE NOTES
Airway       Patient location during procedure: OR       Procedure Start/Stop Times: 9/18/2024 7:53 AM  Staff -        CRNA: Phil Cooney APRN CRNA       Other Anesthesia Staff: Grayson Caceres       Performed By: SRNAIndications and Patient Condition       Indications for airway management: doreen-procedural       Induction type:intravenous       Mask difficulty assessment: 1 - vent by mask    Final Airway Details       Final airway type: endotracheal airway       Successful airway: ETT - single  Endotracheal Airway Details        ETT size (mm): 7.5       Cuffed: yes       Successful intubation technique: direct laryngoscopy       Grade View of Cords: 1       Adjucts: stylet       Position: Right       Measured from: gums/teeth       Secured at (cm): 23       Bite block used: None    Post intubation assessment        Placement verified by: capnometry, equal breath sounds and chest rise        Number of attempts at approach: 1       Number of other approaches attempted: 0       Secured with: tape       Ease of procedure: easy    Medication(s) Administered   Medication Administration Time: 9/18/2024 7:53 AM

## 2024-09-18 NOTE — DISCHARGE INSTRUCTIONS
Contacting your Doctor -   To contact a doctor, call Diego or:  [phone] and ask for the resident on call for Surgery (answered 24 hours a day)   Emergency Department:  [facility]: [phone]    [phone] if you are in need of immediate or emergent help

## 2024-09-18 NOTE — ANESTHESIA PROCEDURE NOTES
"Other (Ilioinguinal/Iliohyprogastric Nerve Block) Procedure Note    Pre-Procedure   Staff -        Anesthesiologist:  Steve Melendrez MD       Resident/Fellow: Crissy Benitez DO       Performed By: resident       Location: pre-op       Pre-Anesthestic Checklist: patient identified, IV checked, site marked, risks and benefits discussed, informed consent, monitors and equipment checked, pre-op evaluation, at physician/surgeon's request and post-op pain management  Timeout:       Correct Patient: Yes        Correct Procedure: Yes        Correct Site: Yes        Correct Position: Yes        Correct Laterality: Yes        Site Marked: Yes  Procedure Documentation  Procedure: Other (Ilioinguinal/Iliohyprogastric Nerve Block)       Diagnosis: POST-OP PAIN       Laterality: right       Patient Position: supine       Skin prep: Chloraprep       Insertion Site: T9-10.       Needle Type: short bevel       Needle Gauge: 21.        Needle Length (millimeters): 110        Ultrasound guided       1. Ultrasound was used to identify targeted nerve, plexus, vascular marker, or fascial plane and place a needle adjacent to it in real-time.       2. Ultrasound was used to visualize the spread of anesthetic in close proximity to the above referenced structure.       3. A permanent image is entered into the patient's record.    Assessment/Narrative         The placement was negative for: blood aspirated, painful injection and site bleeding       Paresthesias: No.       Bolus given via needle..        Secured via.        Insertion/Infusion Method: Single Shot       Complications: none    Medication(s) Administered   Bupivacaine 0.25% PF (Infiltration) - Infiltration   10 mL - 9/18/2024 7:10:00 AM  Bupivacaine liposome (Exparel) 1.3% LA inj susp (Infiltration) - Infiltration   10 mL - 9/18/2024 7:10:00 AM    FOR [facility] ([facility]) ONLY:   Pain Team Contact information: please page the Pain Team Via [employer]. Search \"Pain\". During " daytime hours, please page the attending first. At night please page the resident first.

## 2024-09-18 NOTE — ANESTHESIA CARE TRANSFER NOTE
Patient: Mary Lopez    Procedure: Procedure(s):  Open Inguinal Hernia Repair       Diagnosis: Hernia, inguinal [K40.90]  Diagnosis Additional Information: No value filed.    Anesthesia Type:   General     Note:    Oropharynx: oropharynx clear of all foreign objects  Level of Consciousness: awake  Oxygen Supplementation: face mask  Level of Supplemental Oxygen (L/min / FiO2): 6  Independent Airway: airway patency satisfactory and stable  Dentition: dentition unchanged  Vital Signs Stable: post-procedure vital signs reviewed and stable    Patient transferred to: PACU    Handoff Report: Identifed the Patient, Identified the Reponsible Provider, Reviewed the pertinent medical history, Discussed the surgical course, Reviewed Intra-OP anesthesia mangement and issues during anesthesia, Set expectations for post-procedure period and Allowed opportunity for questions and acknowledgement of understanding      Vitals:  Vitals Value Taken Time   BP     Temp     Pulse 65 09/18/24 1013   Resp 8 09/18/24 1013   SpO2 100 % 09/18/24 1013   Vitals shown include unfiled device data.    Electronically Signed By: LESA Joe CRNA  September 18, 2024  10:13 AM

## 2024-09-18 NOTE — OP NOTE
Transplant Surgery  Operative Note    Preop Dx: Right indirect inguinal hernia, status post liver transplant  Postop Dx: Same  Procedure: Mesh repair of the right inguinal hernia, Osito procedure  Surgeon: Ryder Cortez M.D.  ASSISTANT: Estiven Martel MD fellow.  . There was no qualified general surgery resident available to assist during this procedure.   Anesthesia: General  EBL: Less than 50 mL  Fluids: Please see anesthesiology chart  UO: Please see anesthesiology chart  Drains: None  Specimen: None.  Complications: None  Findings:    #1.  Right indirect inguinal hernia with a wide sac.  #2 the conjoint tendon looked pretty robust  Complications: None.    Indication: The patient has right inguinal hernia which is symptomatic.  After discussing the risks and benefits of surgery and potential complications, the patient provided informed consent.     DETAILS OF PROCEDURE:  The patient was brought to the operating room, placed in a supine position.  Perioperative prophylactic IV antibiotics were given.  Anesthesia was adminisitered. The abdomen was prepped and draped in the usual sterile fashion.  Time out was performed.    We made a 6 cm incision about 4 cm above and parallel to the inguinal ligament.  Skin was incised with knife the subcutaneous tissues were incised with cautery.  We open the inguinal canal by incising the external oblique fascia.  We looped the cord.  We then dissected and identified the hernial sac.  The hernial sac was wide.  We divided the hernial sac and sutured it with 0 Vicryl and reduced it.  We brought in a Prolene mesh and fixed it to the inguinal ligament using interrupted 2-0 Prolene.  The first stitch was on the pubic tubercle periosteum.  We then affixed the superior portion of the mesh to the conjoined tendon using interrupted sutures.  We secured hemostasis.  We closed the inguinal canal by approximating the external oblique aponeurosis using 3-0 Vicryl.  The  subcutaneous tissue was approximated with interrupted Vicryl and skin with Monocryl.    All needle, sponge, and instrument counts were accurate.  The patient tolerated the procedure well without apparent complications and was trasfered to the PACU in good condition.  Faculty was present for critical portions of the procedure.

## 2024-09-20 ENCOUNTER — TELEPHONE (OUTPATIENT)
Dept: TRANSPLANT | Facility: CLINIC | Age: 54
End: 2024-09-20
Payer: COMMERCIAL

## 2024-09-20 ENCOUNTER — MYC MEDICAL ADVICE (OUTPATIENT)
Dept: TRANSPLANT | Facility: CLINIC | Age: 54
End: 2024-09-20
Payer: COMMERCIAL

## 2024-09-20 ENCOUNTER — PREP FOR PROCEDURE (OUTPATIENT)
Dept: GASTROENTEROLOGY | Facility: CLINIC | Age: 54
End: 2024-09-20
Payer: COMMERCIAL

## 2024-09-20 DIAGNOSIS — K83.1 BILIARY STRICTURE (H): Primary | ICD-10-CM

## 2024-09-20 DIAGNOSIS — R30.0 DYSURIA: Primary | ICD-10-CM

## 2024-09-20 RX ORDER — TRAMADOL HYDROCHLORIDE 50 MG/1
50 TABLET ORAL EVERY 6 HOURS PRN
COMMUNITY
Start: 2024-09-20 | End: 2024-10-07

## 2024-09-20 NOTE — TELEPHONE ENCOUNTER
Spoke with patient. He rates the pain 3-4 while sitting and 9-10 with activity. Pain is both incisional and general abdomen.  Patient has been taking Tylenol along with the Oxycodone as needed.     Message sent to Dr. Cortez.

## 2024-09-20 NOTE — TELEPHONE ENCOUNTER
OxyCODONE (ROXICODONE) 5 MG tablet     [id number] - [place], [place] - [address]  AT [address] Phone: [phone]   Fax: [phone]          Per wife Mary is in a lot of pain will be out of Oxy today/PM

## 2024-09-20 NOTE — TELEPHONE ENCOUNTER
A user error has taken place: encounter opened in error, closed for administrative reasons. See previous encounter.

## 2024-09-20 NOTE — TELEPHONE ENCOUNTER
Per Dr. Cortez, Please give him 12 tablets of tramadol.     Patient informed. Patient states Tramadol has not worked in the past. Patient will call on Monday if pain continues.

## 2024-09-23 ENCOUNTER — TELEPHONE (OUTPATIENT)
Dept: GASTROENTEROLOGY | Facility: CLINIC | Age: 54
End: 2024-09-23
Payer: COMMERCIAL

## 2024-09-23 ENCOUNTER — MYC MEDICAL ADVICE (OUTPATIENT)
Dept: FAMILY MEDICINE | Facility: CLINIC | Age: 54
End: 2024-09-23
Payer: COMMERCIAL

## 2024-09-23 RX ORDER — GABAPENTIN 300 MG/1
300 CAPSULE ORAL 2 TIMES DAILY
Qty: 60 CAPSULE | Refills: 1 | Status: SHIPPED | OUTPATIENT
Start: 2024-09-23

## 2024-09-23 ASSESSMENT — ENCOUNTER SYMPTOMS: NEW SYMPTOMS OF CORONARY ARTERY DISEASE: 0

## 2024-09-23 NOTE — TELEPHONE ENCOUNTER
"Call to Mary / Adina to check in.  Mary has a follow up appt w/Dr Cortez on 9/30.  He is still in pain.  He says the only thing that helps him is percocet.  He was given tramadol.  He says he has been making people aware of pain underneath his belly button which is so sore \"to human touch\" since liver transplant.  He has shared this w/ more than 1 doctor and now he has been cut in this area again.    He is not interested in a referral to the pain management team, not interested in alternate therapies.   He's using tylenol, tramadol, ice , heat do help.  Message to dr. Cortez asking if I could give Mary some gabapentin 300 mg bid.  He said yes. Script sent.  "

## 2024-09-23 NOTE — TELEPHONE ENCOUNTER
Writer contacted patient to schedule  Spoke to wife Rosio    Patient scheduled on 10/22.     Following details were discussed:  Will need a , someone to stay with them for 24 hours and should stay in town for 24 hours (within 45 min of Hospital) post procedure  Needs to get pre-op physical completed. If outside  health system will need physical faxed to number [phone]   Will be receiving phone call from [facility] nurses to discuss arrival times etc...       Pre-operative procedure scheduled with who/where? Will contact Dr. Hoyt office and schedule within 30 days  Communicated that arrival times can be anytime from 530am to 4pm: Yes  Any arrival time requests: No

## 2024-09-24 ENCOUNTER — LAB (OUTPATIENT)
Dept: LAB | Facility: CLINIC | Age: 54
End: 2024-09-24
Payer: COMMERCIAL

## 2024-09-24 DIAGNOSIS — D70.9 NEUTROPENIA (H): ICD-10-CM

## 2024-09-24 DIAGNOSIS — R30.0 DYSURIA: ICD-10-CM

## 2024-09-24 DIAGNOSIS — Z94.4 LIVER REPLACED BY TRANSPLANT (H): ICD-10-CM

## 2024-09-24 LAB
ALBUMIN SERPL BCG-MCNC: 3.7 G/DL (ref 3.5–5.2)
ALBUMIN UR-MCNC: NEGATIVE MG/DL
ALP SERPL-CCNC: 203 U/L (ref 40–150)
ALT SERPL W P-5'-P-CCNC: 19 U/L (ref 0–70)
ANION GAP SERPL CALCULATED.3IONS-SCNC: 8 MMOL/L (ref 7–15)
APPEARANCE UR: CLEAR
AST SERPL W P-5'-P-CCNC: 30 U/L (ref 0–45)
BACTERIA #/AREA URNS HPF: NORMAL /HPF
BASOPHILS # BLD AUTO: 0.1 10E3/UL (ref 0–0.2)
BASOPHILS NFR BLD AUTO: 1 %
BILIRUB DIRECT SERPL-MCNC: <0.2 MG/DL (ref 0–0.3)
BILIRUB SERPL-MCNC: 0.4 MG/DL
BILIRUB UR QL STRIP: NEGATIVE
BUN SERPL-MCNC: 29.6 MG/DL (ref 6–20)
CALCIUM SERPL-MCNC: 9.7 MG/DL (ref 8.8–10.4)
CHLORIDE SERPL-SCNC: 102 MMOL/L (ref 98–107)
COLOR UR AUTO: YELLOW
CREAT SERPL-MCNC: 1.62 MG/DL (ref 0.67–1.17)
CYCLOSPORINE BLD LC/MS/MS-MCNC: 205 UG/L (ref 50–400)
EGFRCR SERPLBLD CKD-EPI 2021: 51 ML/MIN/1.73M2
EOSINOPHIL # BLD AUTO: 0.4 10E3/UL (ref 0–0.7)
EOSINOPHIL NFR BLD AUTO: 9 %
ERYTHROCYTE [DISTWIDTH] IN BLOOD BY AUTOMATED COUNT: 12.6 % (ref 10–15)
GLUCOSE SERPL-MCNC: 215 MG/DL (ref 70–99)
GLUCOSE UR STRIP-MCNC: >=1000 MG/DL
HCO3 SERPL-SCNC: 27 MMOL/L (ref 22–29)
HCT VFR BLD AUTO: 35.8 % (ref 40–53)
HGB BLD-MCNC: 12 G/DL (ref 13.3–17.7)
HGB UR QL STRIP: NEGATIVE
IMM GRANULOCYTES # BLD: 0 10E3/UL
IMM GRANULOCYTES NFR BLD: 1 %
KETONES UR STRIP-MCNC: NEGATIVE MG/DL
LEUKOCYTE ESTERASE UR QL STRIP: NEGATIVE
LYMPHOCYTES # BLD AUTO: 1.1 10E3/UL (ref 0.8–5.3)
LYMPHOCYTES NFR BLD AUTO: 26 %
MAGNESIUM SERPL-MCNC: 1.7 MG/DL (ref 1.7–2.3)
MCH RBC QN AUTO: 30.3 PG (ref 26.5–33)
MCHC RBC AUTO-ENTMCNC: 33.5 G/DL (ref 31.5–36.5)
MCV RBC AUTO: 90 FL (ref 78–100)
MONOCYTES # BLD AUTO: 0.5 10E3/UL (ref 0–1.3)
MONOCYTES NFR BLD AUTO: 12 %
NEUTROPHILS # BLD AUTO: 2.1 10E3/UL (ref 1.6–8.3)
NEUTROPHILS NFR BLD AUTO: 51 %
NITRATE UR QL: NEGATIVE
PH UR STRIP: 5.5 [PH] (ref 5–7)
PHOSPHATE SERPL-MCNC: 4.3 MG/DL (ref 2.5–4.5)
PLATELET # BLD AUTO: 240 10E3/UL (ref 150–450)
POTASSIUM SERPL-SCNC: 4.7 MMOL/L (ref 3.4–5.3)
PROT SERPL-MCNC: 6.8 G/DL (ref 6.4–8.3)
RBC # BLD AUTO: 3.96 10E6/UL (ref 4.4–5.9)
RBC #/AREA URNS AUTO: NORMAL /HPF
SODIUM SERPL-SCNC: 137 MMOL/L (ref 135–145)
SP GR UR STRIP: 1.01 (ref 1–1.03)
TME LAST DOSE: NORMAL H
TME LAST DOSE: NORMAL H
UROBILINOGEN UR STRIP-ACNC: 0.2 E.U./DL
WBC # BLD AUTO: 4.1 10E3/UL (ref 4–11)
WBC # BLD AUTO: 4.1 10E3/UL (ref 4–11)
WBC #/AREA URNS AUTO: NORMAL /HPF

## 2024-09-24 PROCEDURE — 85025 COMPLETE CBC W/AUTO DIFF WBC: CPT

## 2024-09-24 PROCEDURE — 82248 BILIRUBIN DIRECT: CPT

## 2024-09-24 PROCEDURE — 83735 ASSAY OF MAGNESIUM: CPT

## 2024-09-24 PROCEDURE — 80053 COMPREHEN METABOLIC PANEL: CPT

## 2024-09-24 PROCEDURE — 81001 URINALYSIS AUTO W/SCOPE: CPT

## 2024-09-24 PROCEDURE — 84100 ASSAY OF PHOSPHORUS: CPT

## 2024-09-24 PROCEDURE — 80158 DRUG ASSAY CYCLOSPORINE: CPT

## 2024-09-24 PROCEDURE — 36415 COLL VENOUS BLD VENIPUNCTURE: CPT

## 2024-09-25 DIAGNOSIS — Z94.4 S/P LIVER TRANSPLANT (H): ICD-10-CM

## 2024-09-25 ASSESSMENT — ENCOUNTER SYMPTOMS: NEW SYMPTOMS OF CORONARY ARTERY DISEASE: 0

## 2024-09-25 NOTE — TELEPHONE ENCOUNTER
/ISSUE:   Cyclosporine level 205 on 9/24, goal 100-150, dose 150 mg BID.    PLAN:   Patient confirmed this was an accurate 12-hour trough.   Verified Cyclosporine dose  Confirmed no new medications or or missed doses.   Confirmed no new illness / infection / diarrhea.   Will decrease Cyclosporine dose to 125 mg BID     Repeat labs in a month.  Lore Mckeon RN

## 2024-09-27 ENCOUNTER — TELEPHONE (OUTPATIENT)
Dept: TRANSPLANT | Facility: CLINIC | Age: 54
End: 2024-09-27
Payer: COMMERCIAL

## 2024-09-27 DIAGNOSIS — K40.90 RIGHT INGUINAL HERNIA: Primary | ICD-10-CM

## 2024-09-27 DIAGNOSIS — R10.9 ABDOMINAL PAIN: Primary | ICD-10-CM

## 2024-09-27 RX ORDER — CYCLOSPORINE 25 MG/1
25 CAPSULE, LIQUID FILLED ORAL EVERY 12 HOURS
Qty: 180 CAPSULE | Refills: 3 | Status: SHIPPED | OUTPATIENT
Start: 2024-09-27

## 2024-09-27 RX ORDER — CYCLOSPORINE 100 MG/1
100 CAPSULE, LIQUID FILLED ORAL EVERY 12 HOURS
Qty: 60 CAPSULE | Refills: 11 | Status: SHIPPED | OUTPATIENT
Start: 2024-09-27

## 2024-09-27 RX ORDER — OXYCODONE AND ACETAMINOPHEN 5; 325 MG/1; MG/1
1 TABLET ORAL EVERY 6 HOURS PRN
Qty: 20 TABLET | Refills: 0 | Status: SHIPPED | OUTPATIENT
Start: 2024-09-27 | End: 2024-10-02

## 2024-09-27 ASSESSMENT — ENCOUNTER SYMPTOMS: NEW SYMPTOMS OF CORONARY ARTERY DISEASE: 0

## 2024-09-27 NOTE — TELEPHONE ENCOUNTER
"Message from Adina stating that Mary is stll having \"quitea bit of pain.  We started him on gabapentin on 9/23, he says it's not helping.  He is asking for percocet.  Sees Dr. Cortez on Monday.   Adina / Mary agreed to pain management referral.  Message to Dr. Cortez  "

## 2024-09-27 NOTE — TELEPHONE ENCOUNTER
Dr. Cortez ok w/ giving pain med.  Pt requesting percocet. Message to Vicki Mccord asking her to prescribe. Adina would like this prescription sent to [facility] in [place] on [facility] if possible, otherwise  CSC

## 2024-09-29 ENCOUNTER — LAB (OUTPATIENT)
Dept: LAB | Facility: CLINIC | Age: 54
End: 2024-09-29
Payer: COMMERCIAL

## 2024-09-29 DIAGNOSIS — D70.9 NEUTROPENIA (H): ICD-10-CM

## 2024-09-29 DIAGNOSIS — Z94.4 LIVER REPLACED BY TRANSPLANT (H): ICD-10-CM

## 2024-09-29 LAB
ALBUMIN SERPL BCG-MCNC: 3.7 G/DL (ref 3.5–5.2)
ALP SERPL-CCNC: 232 U/L (ref 40–150)
ALT SERPL W P-5'-P-CCNC: 32 U/L (ref 0–70)
ANION GAP SERPL CALCULATED.3IONS-SCNC: 8 MMOL/L (ref 7–15)
AST SERPL W P-5'-P-CCNC: 38 U/L (ref 0–45)
BASOPHILS # BLD AUTO: 0 10E3/UL (ref 0–0.2)
BASOPHILS NFR BLD AUTO: 1 %
BILIRUB DIRECT SERPL-MCNC: <0.2 MG/DL (ref 0–0.3)
BILIRUB SERPL-MCNC: 0.3 MG/DL
BUN SERPL-MCNC: 19.4 MG/DL (ref 6–20)
CALCIUM SERPL-MCNC: 9.6 MG/DL (ref 8.8–10.4)
CHLORIDE SERPL-SCNC: 102 MMOL/L (ref 98–107)
CREAT SERPL-MCNC: 1.38 MG/DL (ref 0.67–1.17)
EGFRCR SERPLBLD CKD-EPI 2021: 62 ML/MIN/1.73M2
EOSINOPHIL # BLD AUTO: 0.5 10E3/UL (ref 0–0.7)
EOSINOPHIL NFR BLD AUTO: 10 %
GLUCOSE SERPL-MCNC: 246 MG/DL (ref 70–99)
HCO3 SERPL-SCNC: 27 MMOL/L (ref 22–29)
IMM GRANULOCYTES # BLD: 0 10E3/UL
IMM GRANULOCYTES NFR BLD: 1 %
LYMPHOCYTES # BLD AUTO: 0.8 10E3/UL (ref 0.8–5.3)
LYMPHOCYTES NFR BLD AUTO: 18 %
MAGNESIUM SERPL-MCNC: 1.7 MG/DL (ref 1.7–2.3)
MONOCYTES # BLD AUTO: 0.4 10E3/UL (ref 0–1.3)
MONOCYTES NFR BLD AUTO: 8 %
NEUTROPHILS # BLD AUTO: 2.9 10E3/UL (ref 1.6–8.3)
NEUTROPHILS NFR BLD AUTO: 63 %
PHOSPHATE SERPL-MCNC: 4 MG/DL (ref 2.5–4.5)
POTASSIUM SERPL-SCNC: 4.2 MMOL/L (ref 3.4–5.3)
PROT SERPL-MCNC: 6.8 G/DL (ref 6.4–8.3)
SODIUM SERPL-SCNC: 137 MMOL/L (ref 135–145)
WBC # BLD AUTO: 4.7 10E3/UL (ref 4–11)

## 2024-09-29 PROCEDURE — 36415 COLL VENOUS BLD VENIPUNCTURE: CPT

## 2024-09-29 PROCEDURE — 85004 AUTOMATED DIFF WBC COUNT: CPT

## 2024-09-29 PROCEDURE — 85048 AUTOMATED LEUKOCYTE COUNT: CPT

## 2024-09-29 PROCEDURE — 84100 ASSAY OF PHOSPHORUS: CPT

## 2024-09-29 PROCEDURE — 82248 BILIRUBIN DIRECT: CPT

## 2024-09-29 PROCEDURE — 80053 COMPREHEN METABOLIC PANEL: CPT

## 2024-09-29 PROCEDURE — 83735 ASSAY OF MAGNESIUM: CPT

## 2024-09-29 PROCEDURE — 80158 DRUG ASSAY CYCLOSPORINE: CPT

## 2024-09-30 ENCOUNTER — LAB (OUTPATIENT)
Dept: LAB | Facility: CLINIC | Age: 54
End: 2024-09-30
Payer: COMMERCIAL

## 2024-09-30 ENCOUNTER — OFFICE VISIT (OUTPATIENT)
Dept: TRANSPLANT | Facility: CLINIC | Age: 54
End: 2024-09-30
Attending: TRANSPLANT SURGERY
Payer: COMMERCIAL

## 2024-09-30 VITALS
SYSTOLIC BLOOD PRESSURE: 150 MMHG | DIASTOLIC BLOOD PRESSURE: 92 MMHG | HEART RATE: 66 BPM | OXYGEN SATURATION: 98 % | WEIGHT: 201.6 LBS | BODY MASS INDEX: 28.93 KG/M2

## 2024-09-30 DIAGNOSIS — Z94.4 LIVER TRANSPLANTED (H): Primary | ICD-10-CM

## 2024-09-30 DIAGNOSIS — Z94.4 LIVER TRANSPLANTED (H): ICD-10-CM

## 2024-09-30 LAB
CYCLOSPORINE BLD LC/MS/MS-MCNC: 134 UG/L (ref 50–400)
GGT SERPL-CCNC: 71 U/L (ref 8–61)
TME LAST DOSE: NORMAL H
TME LAST DOSE: NORMAL H

## 2024-09-30 PROCEDURE — 82977 ASSAY OF GGT: CPT | Performed by: PATHOLOGY

## 2024-09-30 PROCEDURE — 99213 OFFICE O/P EST LOW 20 MIN: CPT | Performed by: TRANSPLANT SURGERY

## 2024-09-30 PROCEDURE — 99213 OFFICE O/P EST LOW 20 MIN: CPT | Mod: 24 | Performed by: TRANSPLANT SURGERY

## 2024-09-30 PROCEDURE — 36415 COLL VENOUS BLD VENIPUNCTURE: CPT | Performed by: PATHOLOGY

## 2024-09-30 NOTE — LETTER
9/30/2024      Maryclement Anayarowan Lopez  [address]  [place] [address]      Dear Colleague,    Thank you for referring your patient, Mary Lopez, to the [facility]. Please see a copy of my visit note below.    Transplant Surgery -OUTPATIENT IMMUNOSUPPRESSION PROGRESS NOTE    Date of Visit: 09/30/2024    Transplants:  3/5/2024 (Liver); Postoperative day:  209  ASSESMENT AND PLAN:  1.Graft Function:Liver allograft: no rejection. Has elevated alkaline phosphatase, repeat labs including GGT; Has repeat ERCP on 10/22/2024  2.Immunosuppression Management:Cyclosporine goal 200 ng/dL  3.Hypertension:ok  4.Renal Function:ok  5.Lab frequency:twice weekly  6.Other:  Hernia wound healed well    Date: September 30, 2024    Transplant:  [x]                             Liver []                              Kidney []                             Pancreas []                              Other:             Chief Complaint:Post-op Visit (Hernia repair )    Doing well    History of Present Illness:  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)     CKD stage 3a, GFR 45-59 ml/min (H)     Right inguinal hernia     History of biliary duct stent placement     Inguinal hernia     SOCIAL /FAMILY HISTORY: [x]                  No recent change    Past Medical History:   Diagnosis Date     ANGEL (acute kidney injury) (H)      Alcoholic cirrhosis of liver without ascites (H) 07/13/2023     Alcoholic hepatitis with ascites (H) 10/03/2023     Alcoholic hepatitis without ascites (H) 07/13/2023     CKD stage 3a, GFR 45-59 ml/min (H) 08/08/2024     Closed  fracture of one rib of left side 09/14/2023     Concussion without loss of consciousness 03/11/2020     Decompensated hepatic cirrhosis (H) 09/15/2023     Diabetes mellitus, type 2 (H) 11/22/2023     Essential hypertension 03/11/2020     Ganglion cyst of wrist, right 04/18/2024     History of biliary duct stent placement 09/05/2024     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)      Liver replaced by transplant (H) 03/05/2024     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    ACR PRAJAPATI 6-7/9, treated with steroids     Mild hyperlipidemia 12/07/2021     Persistent insomnia 07/13/2023     Portal hypertension (H) 07/13/2023     Right inguinal hernia 08/08/2024     Scrotal abscess      Secondary esophageal 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 (H) 07/13/2023     Tobacco abuse disorder 03/11/2020     Type 2 diabetes mellitus with hyperglycemia (H) 12/22/2023     Past Surgical History:   Procedure Laterality Date     BENCH LIVER  3/5/2024    Procedure: Bench liver;  Surgeon: Ryder Cortez MD;  Location: [facility]     CHOLECYSTECTOMY       COLONOSCOPY N/A 1/2/2024    Procedure: COLONOSCOPY, WITH POLYPECTOMY;  Surgeon: Jak Urbina MD;  Location: [facility]     CV RIGHT HEART CATH MEASUREMENTS RECORDED N/A 1/30/2024    Procedure: Right Heart Catheterization;  Surgeon: Alfred Tafoya MD;  Location:  HEART CARDIAC CATH LAB     ENDOSCOPIC RETROGRADE CHOLANGIOPANCREATOGRAPHY, EXCHANGE TUBE/STENT N/A 8/13/2024    Procedure: Endoscopic Retrograde Cholangiopancreatography with biliary sphincterotomy, balloon dilation, pancreatic stent placement,biliary stent exchange;  Surgeon: Naldo Rodriguez MD;  Location: [facility]     ENTEROSCOPY SMALL BOWEL N/A 7/12/2024    Procedure: Enteroscopy small bowel;  Surgeon: Hugo Daigle MD;  Location: [facility]     ESOPHAGOSCOPY, GASTROSCOPY, DUODENOSCOPY (EGD), COMBINED N/A 7/12/2024    Procedure: Esophagoscopy, gastroscopy, duodenoscopy (EGD), combined;  Surgeon: Flavio Childs  MD Hugo;  Location: [facility]     HERNIORRHAPHY INGUINAL Right 2024    Procedure: Open Inguinal Hernia Repair;  Surgeon: Rdyer Cortez MD;  Location: [facility]     IR LIVER BIOPSY PERCUTANEOUS  3/15/2024     IR RETROPERITONEAL ABSCESS DRAINAGE  2024     TONSILLECTOMY       TRANSPLANT LIVER RECIPIENT  DONOR N/A 3/5/2024    Procedure: Transplant liver recipient  donor, bile duct stent placement;  Surgeon: Ryder Cortez MD;  Location: [facility]     VASECTOMY       Social History     Socioeconomic History     Marital status:      Spouse name: Not on file     Number of children: Not on file     Years of education: Not on file     Highest education level: Not on file   Occupational History     Occupation: Not working now   Tobacco Use     Smoking status: Former     Types: Cigarettes     Passive exposure: Never     Smokeless tobacco: Current     Types: Chew     Last attempt to quit: 2004     Tobacco comments:     Chew daily 1/8 of a tin per day   Vaping Use     Vaping status: Never Used   Substance and Sexual Activity     Alcohol use: Not Currently     Alcohol/week: 12.0 standard drinks of alcohol     Types: 12 Standard drinks or equivalent per week     Comment: Sober since 2023     Drug use: Not Currently     Sexual activity: Yes     Partners: Female     Birth control/protection: Male Surgical   Other Topics Concern     Parent/sibling w/ CABG, MI or angioplasty before 65F 55M? No   Social History Narrative     Not on file     Social Determinants of Health     Financial Resource Strain: Low Risk  (2023)    Financial Resource Strain      Within the past 12 months, have you or your family members you live with been unable to get utilities (heat, electricity) when it was really needed?: No   Food Insecurity: No Food Insecurity (2024)    Received from [facility]    Hunger Vital Sign      Worried About Running Out of Food in the Last Year: Never true      Ran  Out of Food in the Last Year: Never true   Transportation Needs: No Transportation Needs (6/22/2024)    Received from [facility]    PRAPARE - Transportation      Lack of Transportation (Medical): No      Lack of Transportation (Non-Medical): No   Physical Activity: Not on file   Stress: Not on file   Social Connections: Not on file   Interpersonal Safety: Low Risk  (9/18/2024)    Interpersonal Safety      Do you feel physically and emotionally safe where you currently live?: Yes      Within the past 12 months, have you been hit, slapped, kicked or otherwise physically hurt by someone?: No      Within the past 12 months, have you been humiliated or emotionally abused in other ways by your partner or ex-partner?: No   Housing Stability: Low Risk  (6/22/2024)    Received from [facility]    Housing Stability Vital Sign      Unable to Pay for Housing in the Last Year: No      Number of Times Moved in the Last Year: 0      Homeless in the Last Year: No     Prescription Medications as of 9/30/2024         Rx Number Disp Refills Start End Last Dispensed Date Next Fill Date Owning Pharmacy    ACE/ARB/ARNI NOT PRESCRIBED (INTENTIONAL)  -- --  --       Sig: Please choose reason not prescribed from choices below.    Class: Historical    acetaminophen (TYLENOL) 325 MG tablet  -- -- 6/27/2024 --       Sig: Take 3 tablets (975 mg) by mouth every 8 hours as needed for mild pain Try first before Tramadol.    Class: No Print Out    Route: Oral    aspirin (ASA) 325 MG tablet  -- -- 9/4/2024 --       Sig: Take 1 tablet (325 mg) by mouth or Feeding Tube daily.    Class: Historical    Route: Oral or Feeding Tube    blood glucose (NO BRAND SPECIFIED) test strip  100 strip 6 10/24/2023 --   [id number] - [place], [place] - [address] AT [facility]    Sig: Use to test blood sugar two times daily or as directed. To accompany: Blood Glucose Monitor Brands: per insurance.    Class: E-Prescribe     blood glucose monitoring (NO BRAND SPECIFIED) meter device kit  1 kit 0 10/24/2023 --   [facility] #[id number] - [place], [address]    Sig: Use to test blood sugar twice times daily or as directed. Preferred blood glucose meter OR supplies to accompany: Blood Glucose Monitor Brands: per insurance.    Class: E-Prescribe    Continuous Glucose Sensor (DEXCOM G7 SENSOR) MISC  3 each 5 8/27/2024 --   [facility] #[id number] - [place], [address]    Sig: Change every 10 days.    Class: E-Prescribe    cycloSPORINE modified (GENERIC EQUIVALENT) 100 MG capsule  60 capsule 11 9/27/2024 --   [facility] #[id number] - [place], [address]    Sig: Take 1 capsule (100 mg) by mouth every 12 hours. Total dose 125mg BID    Class: E-Prescribe    Route: Oral    cycloSPORINE modified (GENERIC EQUIVALENT) 25 MG capsule  180 capsule 3 9/27/2024 --   [facility] #[id number] - [place], [address]    Sig: Take 1 capsule (25 mg) by mouth every 12 hours. Total dose 125mg BID    Class: E-Prescribe    Route: Oral    dapsone (ACZONE) 25 MG tablet  -- -- 9/4/2024 --       Sig: Take 2 tablets (50 mg) by mouth daily. Medication complete when pills run out.    Class: Historical    Route: Oral    empagliflozin (JARDIANCE) 25 MG TABS tablet  90 tablet 1 6/6/2024 --   [facility] #[id number] - [place], [place] [id number] [facility]    Sig: Take 1 tablet (25 mg) by mouth daily    Class: E-Prescribe    Route: Oral    fludrocortisone (FLORINEF) 0.1 MG tablet  30 tablet 1 8/7/2024 --   [facility] #[id number] - [place], [place] [id number] [facility]    Sig: Take 1 tablet (0.1 mg) by mouth daily    Class: E-Prescribe    Route: Oral    gabapentin (NEURONTIN) 300 MG capsule  60 capsule 1 9/23/2024 --   [facility] #[id number] - [place], [place] - [address] AT [facility]    Sig: Take 1 capsule (300 mg) by mouth 2 times daily.    Class: E-Prescribe    Route: Oral    Glucagon (GVOKE HYPOPEN) 1 MG/0.2ML pen  0.4 mL 1 3/21/2024 --   [facility] [place], [place] - [address]    Sig: Inject the contents of 1 device under the skin into lower abdomen, outer thigh, or outer upper arm as needed for hypoglycemia. If no response after 15 minutes, additional 1 mg dose from a new device may be injected while waiting for emergency assistance.    Class: E-Prescribe    Notes to Pharmacy: Replaces [id number] per insurance formulary    Injection Device for insulin (CEQUR SIMPLICITY 2U) KHUSHBOO  10 each 5 2024 --   [facility] #[id number] - [place], [place] - [address] [facility]    Si each every 3 days    Class: E-Prescribe    Route: Does not apply    insulin degludec (TRESIBA FLEXTOUCH) 100 UNIT/ML pen  15 mL 5 2024 --   [facility] #[id number] - [place], [place] - [address] [facility]    Sig: Inject 16 units subcutaneous each am. Please do NOT fill today ( 2024 ).    Class: E-Prescribe    Insulin Lispro-aabc, 1 U Dial, (LYUMJEV KWIKPEN) 100 UNIT/ML SOPN  30 mL 2 2024 --   [facility] #[id number] - [place], [place] - [address] [facility] [facility]    Sig: Inject 6 Units Subcutaneous 3 times daily (with meals) 6 units with meals, plus correction. Pt may use up to 30 units daily. This REPLACES Humalog/Novolog insulin.    Class: E-Prescribe    Route: Subcutaneous    insulin pen needle (29G X 12.7MM) 29G X 12.7MM miscellaneous  90 each 1 2024 --   [facility] #[id number] - [place], [place] - [address] [facility]    Sig: Use 1 pen needle every three days or as directed.    Class: E-Prescribe    insulin pen needle (32G X 4 MM) 32G X 4 MM miscellaneous  200 each 1 3/21/2024 --   [facility] - [place], [place] - [facility]  [facility]    Sig: Use as directed by provider Per insurance coverage    Class: E-Prescribe    insulin pen needle (BD PEN NEEDLE SHERRIE 2ND GEN) 32G X 4 MM miscellaneous  100 each 11 1/8/2024 --   [facility] #[id number] - [place]    Sig: USES 5 PER DAY    Class: E-Prescribe    Magnesium Bisglycinate (MAG GLYCINATE) 100 MG TABS  180 tablet 1 8/19/2024 --   [facility] #[id number] - [place]    Sig: Take 1 tablet (100 mg) by mouth 2 times daily    Class: E-Prescribe    Route: Oral    melatonin 5 MG tablet  30 tablet 2 6/27/2024 --   [facility] - [place], [place] - [address] [place] [address]    Sig: Take 1 tablet (5 mg) by mouth daily (with dinner) Take daily at dusk.    Class: E-Prescribe    Route: Oral    Renewals       Renewal requests to authorizing provider (Fabi Aguirre NP) <b>prohibited</b>            multivitamin w/minerals (THERA-VIT-M) tablet  90 tablet 0 9/11/2024 --   [facility] #[id number] - [place]    Sig: Take 1 tablet by mouth daily.    Class: E-Prescribe    Route: Oral    mycophenolic acid (GENERIC EQUIVALENT) 180 MG EC tablet  180 tablet 2 8/23/2024 --   [facility] #[id number] - [place]    Sig: Take 1 tablet (180 mg) by mouth 2 times daily.    Class: E-Prescribe    Notes to Pharmacy: TXP DT 3/5/2024 (Liver) TXP Dischg DT 3/21/2024 DX Liver replaced by transplant Z94.4 [facility] ([place], [place])    Route: Oral    omeprazole (PRILOSEC) 20 MG DR capsule  180 capsule 1 8/3/2023 --       Sig: Take 20 mg by mouth daily    Class: Historical    Route: Oral    oxyCODONE-acetaminophen (PERCOCET) 5-325 MG tablet  20 tablet 0 9/27/2024 10/2/2024   [facility] #[id number] - [place], [place] - [address] [facility]  [address]    Sig: Take 1 tablet by mouth every 6 hours as needed for pain.    Class: E-Prescribe    Earliest Fill Date: 9/27/2024    Route: Oral    QUEtiapine (SEROQUEL) 25 MG tablet  -- -- 6/27/2024 --       Sig: Take 1-2 tablets (25-50 mg) by mouth nightly as needed (Sleep)    Class: No Print Out    Route: Oral    sodium zirconium cyclosilicate (LOKELMA) 10 g PACK packet  30 each 0 6/27/2024 --   [facility] [place], [place] - [address] [place] [address]    Sig: Take 1 packet (10 g) by mouth daily as needed (Hyperkalemia)    Class: E-Prescribe    Route: Oral    Renewals       Renewal requests to authorizing provider (Fabi Aguirre NP) <b>prohibited</b>            thin (NO BRAND SPECIFIED) lancets  100 each 6 10/24/2023 --   [facility] #[id number] - [place], [place] - [address] [facility] [address] [address]    Sig: Use with lanceting device. To accompany: Blood Glucose Monitor Brands: per insurance.    Class: E-Prescribe    traMADol (ULTRAM) 50 MG tablet  -- -- 9/20/2024 --       Sig: Take 1 tablet (50 mg) by mouth every 6 hours as needed for severe pain.    Class: Historical    Route: Oral    ursodiol (ACTIGALL) 300 MG capsule  180 capsule 3 6/13/2024 --   [facility] #[id number] - [place], [place] - [address] [facility]    Sig: Take 1 capsule (300 mg) by mouth 2 times daily    Class: E-Prescribe    Route: Oral          Other food allergy and Pineapple   REVIEW OF SYSTEMS (check box if normal)  [x]               GENERAL  [x]                 PULMONARY [x]                GENITOURINARY  [x]                CNS                 [x]                 CARDIAC  [x]                 ENDOCRINE  [x]                EARS,NOSE,THROAT [x]                 GASTROINTESTINAL [x]                 NEUROLOGIC    [x]                MUSCLOSKELTAL  [x]                  HEMATOLOGY      PHYSICAL EXAM (check box if normal)BP (!) 150/92   Pulse 66   Wt 91.4 kg (201 lb 9.6 oz)   SpO2 98%   BMI 28.93  kg/m          [x]            GENERAL:    [x]       EYES:  ICTERIC   []        YES  []                    NO  [x]           EXTREMITIES: Clubbing []       Y     [x]           N    [x]           EARS, NOSE, THROAT: Membranes Moist    YES   [x]                   NO []                  [x]           LUNGS:  CLEAR    YES       [x]                  NO    []                                [x]           SKIN: Jaundice           YES       []                  NO    [x]                   Rash: YES       []                  NO    [x]                                     [x]             HEART: Regular Rate          YES       [x]                  NO    []                   Incision Clean:  YES       [x]                  NO    []                                [x]                    ABDOMEN: Organomegaly YES       []                  NO    [x]                       [x]                    NEUROLOGICAL:  Nonfocal  YES       [x]                  NO    []                       [x]                    Hernia YES       []                  NO    [x]                   PSYCHIATRIC:  Appropriate  YES       [x]                  NO    []                       OTHER:                                                                                                   PAIN SCALE:: 3       Again, thank you for allowing me to participate in the care of your patient.        Sincerely,        Ryder Cortez MD

## 2024-09-30 NOTE — PROGRESS NOTES
Transplant Surgery -OUTPATIENT IMMUNOSUPPRESSION PROGRESS NOTE    Date of Visit: 09/30/2024    Transplants:  3/5/2024 (Liver); Postoperative day:  209  ASSESMENT AND PLAN:  1.Graft Function:Liver allograft: no rejection. Has elevated alkaline phosphatase, repeat labs including GGT; Has repeat ERCP on 10/22/2024  2.Immunosuppression Management:Cyclosporine goal 200 ng/dL  3.Hypertension:ok  4.Renal Function:ok  5.Lab frequency:twice weekly  6.Other:  Hernia wound healed well    Date: September 30, 2024    Transplant:  [x]                             Liver []                              Kidney []                             Pancreas []                              Other:             Chief Complaint:Post-op Visit (Hernia repair )    Doing well    History of Present Illness:  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)    CKD stage 3a, GFR 45-59 ml/min (H)    Right inguinal hernia    History of biliary duct stent placement    Inguinal hernia     SOCIAL /FAMILY HISTORY: [x]                  No recent change    Past Medical History:   Diagnosis Date    ANGEL (acute kidney injury) (H)     Alcoholic cirrhosis of liver without ascites (H) 07/13/2023    Alcoholic hepatitis with ascites (H) 10/03/2023    Alcoholic hepatitis without ascites (H) 07/13/2023    CKD stage 3a, GFR 45-59 ml/min (H) 08/08/2024    Closed fracture of one rib of left side 09/14/2023    Concussion without loss of consciousness 03/11/2020    Decompensated hepatic cirrhosis (H) 09/15/2023    Diabetes mellitus, type 2 (H) 11/22/2023    Essential hypertension 03/11/2020    Ganglion cyst of wrist, right 04/18/2024     History of biliary duct stent placement 09/05/2024    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)     Liver replaced by transplant (H) 03/05/2024    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    ACR PRAJAPATI 6-7/9, treated with steroids    Mild hyperlipidemia 12/07/2021    Persistent insomnia 07/13/2023    Portal hypertension (H) 07/13/2023    Right inguinal hernia 08/08/2024    Scrotal abscess     Secondary esophageal 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 (H) 07/13/2023    Tobacco abuse disorder 03/11/2020    Type 2 diabetes mellitus with hyperglycemia (H) 12/22/2023     Past Surgical History:   Procedure Laterality Date    BENCH LIVER  3/5/2024    Procedure: Bench liver;  Surgeon: Ryder Cortez MD;  Location: [facility]    CHOLECYSTECTOMY      COLONOSCOPY N/A 1/2/2024    Procedure: COLONOSCOPY, WITH POLYPECTOMY;  Surgeon: Jak Urbina MD;  Location: [facility]    CV RIGHT HEART CATH MEASUREMENTS RECORDED N/A 1/30/2024    Procedure: Right Heart Catheterization;  Surgeon: Alfred Tafoya MD;  Location:  HEART CARDIAC CATH LAB    ENDOSCOPIC RETROGRADE CHOLANGIOPANCREATOGRAPHY, EXCHANGE TUBE/STENT N/A 8/13/2024    Procedure: Endoscopic Retrograde Cholangiopancreatography with biliary sphincterotomy, balloon dilation, pancreatic stent placement,biliary stent exchange;  Surgeon: Naldo Rodriguez MD;  Location: [facility]    ENTEROSCOPY SMALL BOWEL N/A 7/12/2024    Procedure: Enteroscopy small bowel;  Surgeon: Hugo Daigle MD;  Location: [facility]    ESOPHAGOSCOPY, GASTROSCOPY, DUODENOSCOPY (EGD), COMBINED N/A 7/12/2024    Procedure: Esophagoscopy, gastroscopy, duodenoscopy (EGD), combined;  Surgeon: Hugo Daigle MD;  Location: [facility]    HERNIORRHAPHY INGUINAL Right 9/18/2024    Procedure: Open Inguinal Hernia Repair;  Surgeon: Ryder Cortez MD;  Location: [facility]    IR LIVER BIOPSY PERCUTANEOUS  3/15/2024    IR RETROPERITONEAL ABSCESS DRAINAGE  4/1/2024    TONSILLECTOMY      TRANSPLANT LIVER  RECIPIENT  DONOR N/A 3/5/2024    Procedure: Transplant liver recipient  donor, bile duct stent placement;  Surgeon: Ryder Cortez MD;  Location: [facility]    VASECTOMY       Social History     Socioeconomic History    Marital status:      Spouse name: Not on file    Number of children: Not on file    Years of education: Not on file    Highest education level: Not on file   Occupational History    Occupation: Not working now   Tobacco Use    Smoking status: Former     Types: Cigarettes     Passive exposure: Never    Smokeless tobacco: Current     Types: Chew     Last attempt to quit: 2004    Tobacco comments:     Chew daily 1/8 of a tin per day   Vaping Use    Vaping status: Never Used   Substance and Sexual Activity    Alcohol use: Not Currently     Alcohol/week: 12.0 standard drinks of alcohol     Types: 12 Standard drinks or equivalent per week     Comment: Sober since 2023    Drug use: Not Currently    Sexual activity: Yes     Partners: Female     Birth control/protection: Male Surgical   Other Topics Concern    Parent/sibling w/ CABG, MI or angioplasty before 65F 55M? No   Social History Narrative    Not on file     Social Determinants of Health     Financial Resource Strain: Low Risk  (2023)    Financial Resource Strain     Within the past 12 months, have you or your family members you live with been unable to get utilities (heat, electricity) when it was really needed?: No   Food Insecurity: No Food Insecurity (2024)    [facility]    Hunger Vital Sign     Worried About Running Out of Food in the Last Year: Never true     Ran Out of Food in the Last Year: Never true   Transportation Needs: No Transportation Needs (2024)    Received from [facility]    PRAPARE - Transportation     Lack of Transportation (Medical): No     Lack of Transportation (Non-Medical): No   Physical Activity: Not on file   Stress: Not on file   Social  Connections: Not on file   Interpersonal Safety: Low Risk  (9/18/2024)    Interpersonal Safety     Do you feel physically and emotionally safe where you currently live?: Yes     Within the past 12 months, have you been hit, slapped, kicked or otherwise physically hurt by someone?: No     Within the past 12 months, have you been humiliated or emotionally abused in other ways by your partner or ex-partner?: No   Housing Stability: Low Risk  (6/22/2024)    [facility] [employer]    Housing Stability Vital Sign     Unable to Pay for Housing in the Last Year: No     Number of Times Moved in the Last Year: 0     Homeless in the Last Year: No     Prescription Medications as of 9/30/2024         Rx Number Disp Refills Start End Last Dispensed Date Next Fill Date Owning Pharmacy    ACE/ARB/ARNI NOT PRESCRIBED (INTENTIONAL)  -- --  --       Sig: Please choose reason not prescribed from choices below.    Class: Historical    acetaminophen (TYLENOL) 325 MG tablet  -- -- 6/27/2024 --       Sig: Take 3 tablets (975 mg) by mouth every 8 hours as needed for mild pain Try first before Tramadol.    Class: No Print Out    Route: Oral    aspirin (ASA) 325 MG tablet  -- -- 9/4/2024 --       Sig: Take 1 tablet (325 mg) by mouth or Feeding Tube daily.    Class: Historical    Route: Oral or Feeding Tube    blood glucose (NO BRAND SPECIFIED) test strip  100 strip 6 10/24/2023 --   [facility] #[id number] - [place], [place] - [address] [facility] [facility] [address]    Sig: Use to test blood sugar two times daily or as directed. To accompany: Blood Glucose Monitor Brands: per insurance.    Class: E-Prescribe    blood glucose monitoring (NO BRAND SPECIFIED) meter device kit  1 kit 0 10/24/2023 --   [facility] #[id number] - [place], [place] - [address] [facility] [facility] [address] [facility]    Sig: Use to test blood sugar twice times daily or as directed. Preferred blood glucose meter OR supplies to accompany: Blood Glucose Monitor  Brands: per insurance.    Class: E-Prescribe    Continuous Glucose Sensor (DEXCOM G7 SENSOR) MISC  3 each 5 8/27/2024 --   [facility] #[id number] - [place], [address]    Sig: Change every 10 days.    Class: E-Prescribe    cycloSPORINE modified (GENERIC EQUIVALENT) 100 MG capsule  60 capsule 11 9/27/2024 --   [facility] #[id number] - [place], [address]    Sig: Take 1 capsule (100 mg) by mouth every 12 hours. Total dose 125mg BID    Class: E-Prescribe    Route: Oral    cycloSPORINE modified (GENERIC EQUIVALENT) 25 MG capsule  180 capsule 3 9/27/2024 --   [id number] - [place], [address]    Sig: Take 1 capsule (25 mg) by mouth every 12 hours. Total dose 125mg BID    Class: E-Prescribe    Route: Oral    dapsone (ACZONE) 25 MG tablet  -- -- 9/4/2024 --       Sig: Take 2 tablets (50 mg) by mouth daily. Medication complete when pills run out.    Class: Historical    Route: Oral    empagliflozin (JARDIANCE) 25 MG TABS tablet  90 tablet 1 6/6/2024 --   [id number] - [place], [place] [id number] [facility]    Sig: Take 1 tablet (25 mg) by mouth daily    Class: E-Prescribe    Route: Oral    fludrocortisone (FLORINEF) 0.1 MG tablet  30 tablet 1 8/7/2024 --   [id number] - [place], [place] [address] [facility]    Sig: Take 1 tablet (0.1 mg) by mouth daily    Class: E-Prescribe    Route: Oral    gabapentin (NEURONTIN) 300 MG capsule  60 capsule 1 9/23/2024 --   [facility] #[id number] - [place], [place] [address] [facility]    Sig: Take 1 capsule (300 mg) by mouth 2 times daily.    Class: E-Prescribe    Route: Oral    Glucagon (GVOKE HYPOPEN) 1 MG/0.2ML pen  0.4 mL 1 3/21/2024 --   [facility] - [place], [place] - [address]    Sig: Inject the contents of 1 device under  the skin into lower abdomen, outer thigh, or outer upper arm as needed for hypoglycemia. If no response after 15 minutes, additional 1 mg dose from a new device may be injected while waiting for emergency assistance.    Class: E-Prescribe    Notes to Pharmacy: Replaces [id number] per insurance formulary    Injection Device for insulin (CEQUR SIMPLICITY 2U) KHUSHBOO  10 each 5 2024 --   [facility] #[id number] - [place], [place] - [address] [facility]    Si each every 3 days    Class: E-Prescribe    Route: Does not apply    insulin degludec (TRESIBA FLEXTOUCH) 100 UNIT/ML pen  15 mL 5 2024 --   [facility] #[id number] - [place], [place] - [address] [facility]    Sig: Inject 16 units subcutaneous each am. Please do NOT fill today ( 2024 ).    Class: E-Prescribe    Insulin Lispro-aabc, 1 U Dial, (LYUMJEV KWIKPEN) 100 UNIT/ML SOPN  30 mL 2 2024 --   [facility] #[id number] - [place], [place] - [address] [facility]    Sig: Inject 6 Units Subcutaneous 3 times daily (with meals) 6 units with meals, plus correction. Pt may use up to 30 units daily. This REPLACES Humalog/Novolog insulin.    Class: E-Prescribe    Route: Subcutaneous    insulin pen needle (29G X 12.7MM) 29G X 12.7MM miscellaneous  90 each 1 2024 --   [facility] #[id number] - [place], [place] - [address] [facility]    Sig: Use 1 pen needle every three days or as directed.    Class: E-Prescribe    insulin pen needle (32G X 4 MM) 32G X 4 MM miscellaneous  200 each 1 3/21/2024 --   [facility] [place], [place] - [address] [facility]    Sig: Use as directed by provider Per insurance coverage    Class: E-Prescribe    insulin pen needle (BD PEN NEEDLE SHERRIE 2ND GEN) 32G X 4 MM miscellaneous  100 each 11 2024 --   [facility] #[id number] - [place], [place] - [address] [facility]    Sig: USES 5 PER DAY    Class:  E-Prescribe    Magnesium Bisglycinate (MAG GLYCINATE) 100 MG TABS  180 tablet 1 8/19/2024 --   [facility] #[id number] - [place], [place] - [address]    Sig: Take 1 tablet (100 mg) by mouth 2 times daily    Class: E-Prescribe    Route: Oral    melatonin 5 MG tablet  30 tablet 2 6/27/2024 --   [facility] - [place], [place] - [address] [place] [address]    Sig: Take 1 tablet (5 mg) by mouth daily (with dinner) Take daily at dusk.    Class: E-Prescribe    Route: Oral    Renewals       Renewal requests to authorizing provider (Fabi Aguirre, NP) <b>prohibited</b>            multivitamin w/minerals (THERA-VIT-M) tablet  90 tablet 0 9/11/2024 --   [facility] #[id number] - [place], [place] - [address]    Sig: Take 1 tablet by mouth daily.    Class: E-Prescribe    Route: Oral    mycophenolic acid (GENERIC EQUIVALENT) 180 MG EC tablet  180 tablet 2 8/23/2024 --   [facility] #[id number] - [place], [place] - [address]    Sig: Take 1 tablet (180 mg) by mouth 2 times daily.    Class: E-Prescribe    Notes to Pharmacy: TXP DT 3/5/2024 (Liver) TXP Dischg DT 3/21/2024 DX Liver replaced by transplant Z94.4 TX [facility] ([place], [place])    Route: Oral    omeprazole (PRILOSEC) 20 MG DR capsule  180 capsule 1 8/3/2023 --       Sig: Take 20 mg by mouth daily    Class: Historical    Route: Oral    oxyCODONE-acetaminophen (PERCOCET) 5-325 MG tablet  20 tablet 0 9/27/2024 10/2/2024   [id number] - [place], [place] - [address]    Sig: Take 1 tablet by mouth every 6 hours as needed for pain.    Class: E-Prescribe    Earliest Fill Date: 9/27/2024    Route: Oral    QUEtiapine (SEROQUEL) 25 MG tablet  -- -- 6/27/2024 --       Sig: Take 1-2 tablets (25-50 mg) by mouth nightly as needed (Sleep)    Class: No Print Out    Route: Oral     sodium zirconium cyclosilicate (LOKELMA) 10 g PACK packet  30 each 0 6/27/2024 --   [facility] [place], [place] - [address] [facility]    Sig: Take 1 packet (10 g) by mouth daily as needed (Hyperkalemia)    Class: E-Prescribe    Route: Oral    Renewals       Renewal requests to authorizing provider (Fabi Aguirre NP) <b>prohibited</b>            thin (NO BRAND SPECIFIED) lancets  100 each 6 10/24/2023 --   [facility][id number] - [place], [place] - [address] [facility]    Sig: Use with lanceting device. To accompany: Blood Glucose Monitor Brands: per insurance.    Class: E-Prescribe    traMADol (ULTRAM) 50 MG tablet  -- -- 9/20/2024 --       Sig: Take 1 tablet (50 mg) by mouth every 6 hours as needed for severe pain.    Class: Historical    Route: Oral    ursodiol (ACTIGALL) 300 MG capsule  180 capsule 3 6/13/2024 --   [facility] #[id number] - [place], [place] - [address] [facility] [address]    Sig: Take 1 capsule (300 mg) by mouth 2 times daily    Class: E-Prescribe    Route: Oral          Other food allergy and Pineapple   REVIEW OF SYSTEMS (check box if normal)  [x]               GENERAL  [x]                 PULMONARY [x]                GENITOURINARY  [x]                CNS                 [x]                 CARDIAC  [x]                 ENDOCRINE  [x]                EARS,NOSE,THROAT [x]                 GASTROINTESTINAL [x]                 NEUROLOGIC    [x]                MUSCLOSKELTAL  [x]                  HEMATOLOGY      PHYSICAL EXAM (check box if normal)BP (!) 150/92   Pulse 66   Wt 91.4 kg (201 lb 9.6 oz)   SpO2 98%   BMI 28.93 kg/m          [x]            GENERAL:    [x]       EYES:  ICTERIC   []        YES  []                    NO  [x]           EXTREMITIES: Clubbing []       Y     [x]           N    [x]           EARS, NOSE, THROAT: Membranes Moist    YES   [x]                   NO []                  [x]           LUNGS:  CLEAR    YES        [x]                  NO    []                                [x]           SKIN: Jaundice           YES       []                  NO    [x]                   Rash: YES       []                  NO    [x]                                     [x]             HEART: Regular Rate          YES       [x]                  NO    []                   Incision Clean:  YES       [x]                  NO    []                                [x]                    ABDOMEN: Organomegaly YES       []                  NO    [x]                       [x]                    NEUROLOGICAL:  Nonfocal  YES       [x]                  NO    []                       [x]                    Hernia YES       []                  NO    [x]                   PSYCHIATRIC:  Appropriate  YES       [x]                  NO    []                       OTHER:                                                                                                   PAIN SCALE:: 3

## 2024-10-03 ENCOUNTER — TELEPHONE (OUTPATIENT)
Dept: TRANSPLANT | Facility: CLINIC | Age: 54
End: 2024-10-03
Payer: COMMERCIAL

## 2024-10-03 NOTE — TELEPHONE ENCOUNTER
"Mary is now 7 mos post liver transplant.  He had a right inguinal hernia repair.    Call to patient for general follow-up post hernia repair and  liver transplant follow-up.    Appetite: eating a lot more than I had, working on getting protein. Still has a sweet tooth, works hard to have good blood sugar control.  Very knowledgeable.    Diarrhea: none  Nausea: still occasional nausea \"because my stomach isn't always up to snuff right away in the morning\"  Activity: up ad amy - \"still sore\"   Pain management: \"I'm managing ok but really sore\"  (hernia repair) \"I still hate putting my socks on\". \" I do pet the cat a little more frequently  now\".  Anxiety / depression: none.  Wife Adina is a tremendous help.   Physician follow-up: Seeing Dr. Muniz on Monday 10/7.  Is aware that Dr. Muniz is going to be leaving.  Stent:  had ERCP on 8/13 for removal  Lab frequency: monthly  Med changes: Still on mycophenolate - we took him off for a short time but in July his liver tests went up so we put him back on 180 mg po bid. Wondering if he can come off now, will discuss at Dr. Muniz visit.                    "

## 2024-10-07 ENCOUNTER — TELEPHONE (OUTPATIENT)
Dept: TRANSPLANT | Facility: CLINIC | Age: 54
End: 2024-10-07

## 2024-10-07 ENCOUNTER — LAB (OUTPATIENT)
Dept: LAB | Facility: CLINIC | Age: 54
End: 2024-10-07
Payer: COMMERCIAL

## 2024-10-07 ENCOUNTER — OFFICE VISIT (OUTPATIENT)
Dept: GASTROENTEROLOGY | Facility: CLINIC | Age: 54
End: 2024-10-07
Attending: INTERNAL MEDICINE
Payer: COMMERCIAL

## 2024-10-07 ENCOUNTER — DOCUMENTATION ONLY (OUTPATIENT)
Dept: TRANSPLANT | Facility: CLINIC | Age: 54
End: 2024-10-07

## 2024-10-07 VITALS
WEIGHT: 201.1 LBS | HEIGHT: 70 IN | DIASTOLIC BLOOD PRESSURE: 87 MMHG | RESPIRATION RATE: 18 BRPM | OXYGEN SATURATION: 99 % | HEART RATE: 69 BPM | SYSTOLIC BLOOD PRESSURE: 131 MMHG | BODY MASS INDEX: 28.79 KG/M2 | TEMPERATURE: 98.3 F

## 2024-10-07 DIAGNOSIS — Z94.4 LIVER REPLACED BY TRANSPLANT (H): ICD-10-CM

## 2024-10-07 DIAGNOSIS — Z94.4 LIVER REPLACED BY TRANSPLANT (H): Primary | ICD-10-CM

## 2024-10-07 DIAGNOSIS — D70.9 NEUTROPENIA (H): ICD-10-CM

## 2024-10-07 DIAGNOSIS — K70.11 ALCOHOLIC HEPATITIS WITH ASCITES (H): Chronic | ICD-10-CM

## 2024-10-07 LAB
ALBUMIN SERPL BCG-MCNC: 3.7 G/DL (ref 3.5–5.2)
ALP SERPL-CCNC: 301 U/L (ref 40–150)
ALT SERPL W P-5'-P-CCNC: 37 U/L (ref 0–70)
ANION GAP SERPL CALCULATED.3IONS-SCNC: 10 MMOL/L (ref 7–15)
AST SERPL W P-5'-P-CCNC: 42 U/L (ref 0–45)
BASOPHILS # BLD AUTO: 0.1 10E3/UL (ref 0–0.2)
BASOPHILS NFR BLD AUTO: 1 %
BILIRUB DIRECT SERPL-MCNC: <0.2 MG/DL (ref 0–0.3)
BILIRUB SERPL-MCNC: 0.3 MG/DL
BUN SERPL-MCNC: 25.3 MG/DL (ref 6–20)
CALCIUM SERPL-MCNC: 9.6 MG/DL (ref 8.8–10.4)
CHLORIDE SERPL-SCNC: 101 MMOL/L (ref 98–107)
CREAT SERPL-MCNC: 1.61 MG/DL (ref 0.67–1.17)
CYCLOSPORINE BLD LC/MS/MS-MCNC: 104 UG/L (ref 50–400)
EGFRCR SERPLBLD CKD-EPI 2021: 51 ML/MIN/1.73M2
EOSINOPHIL # BLD AUTO: 0.9 10E3/UL (ref 0–0.7)
EOSINOPHIL NFR BLD AUTO: 15 %
ERYTHROCYTE [DISTWIDTH] IN BLOOD BY AUTOMATED COUNT: 12.7 % (ref 10–15)
GLUCOSE SERPL-MCNC: 356 MG/DL (ref 70–99)
HCO3 SERPL-SCNC: 26 MMOL/L (ref 22–29)
HCT VFR BLD AUTO: 34.6 % (ref 40–53)
HGB BLD-MCNC: 11.7 G/DL (ref 13.3–17.7)
IMM GRANULOCYTES # BLD: 0 10E3/UL
IMM GRANULOCYTES NFR BLD: 0 %
LYMPHOCYTES # BLD AUTO: 1.1 10E3/UL (ref 0.8–5.3)
LYMPHOCYTES NFR BLD AUTO: 18 %
MAGNESIUM SERPL-MCNC: 2 MG/DL (ref 1.7–2.3)
MCH RBC QN AUTO: 30.4 PG (ref 26.5–33)
MCHC RBC AUTO-ENTMCNC: 33.8 G/DL (ref 31.5–36.5)
MCV RBC AUTO: 90 FL (ref 78–100)
MONOCYTES # BLD AUTO: 0.4 10E3/UL (ref 0–1.3)
MONOCYTES NFR BLD AUTO: 7 %
NEUTROPHILS # BLD AUTO: 3.3 10E3/UL (ref 1.6–8.3)
NEUTROPHILS NFR BLD AUTO: 58 %
NRBC # BLD AUTO: 0 10E3/UL
NRBC BLD AUTO-RTO: 0 /100
PHOSPHATE SERPL-MCNC: 4.1 MG/DL (ref 2.5–4.5)
PLATELET # BLD AUTO: 223 10E3/UL (ref 150–450)
POTASSIUM SERPL-SCNC: 4.9 MMOL/L (ref 3.4–5.3)
PROT SERPL-MCNC: 6.8 G/DL (ref 6.4–8.3)
RBC # BLD AUTO: 3.85 10E6/UL (ref 4.4–5.9)
SODIUM SERPL-SCNC: 137 MMOL/L (ref 135–145)
TME LAST DOSE: NORMAL H
TME LAST DOSE: NORMAL H
WBC # BLD AUTO: 5.7 10E3/UL (ref 4–11)
WBC # BLD AUTO: 5.7 10E3/UL (ref 4–11)

## 2024-10-07 PROCEDURE — 99215 OFFICE O/P EST HI 40 MIN: CPT | Mod: 24 | Performed by: INTERNAL MEDICINE

## 2024-10-07 PROCEDURE — 82248 BILIRUBIN DIRECT: CPT | Performed by: INTERNAL MEDICINE

## 2024-10-07 PROCEDURE — G0480 DRUG TEST DEF 1-7 CLASSES: HCPCS | Mod: 90 | Performed by: PATHOLOGY

## 2024-10-07 PROCEDURE — 80158 DRUG ASSAY CYCLOSPORINE: CPT | Performed by: INTERNAL MEDICINE

## 2024-10-07 PROCEDURE — 99213 OFFICE O/P EST LOW 20 MIN: CPT | Performed by: INTERNAL MEDICINE

## 2024-10-07 PROCEDURE — 36415 COLL VENOUS BLD VENIPUNCTURE: CPT | Performed by: PATHOLOGY

## 2024-10-07 PROCEDURE — 85025 COMPLETE CBC W/AUTO DIFF WBC: CPT | Performed by: PATHOLOGY

## 2024-10-07 PROCEDURE — 83735 ASSAY OF MAGNESIUM: CPT | Performed by: INTERNAL MEDICINE

## 2024-10-07 PROCEDURE — 84100 ASSAY OF PHOSPHORUS: CPT | Performed by: INTERNAL MEDICINE

## 2024-10-07 PROCEDURE — 99000 SPECIMEN HANDLING OFFICE-LAB: CPT | Performed by: PATHOLOGY

## 2024-10-07 ASSESSMENT — PAIN SCALES - GENERAL: PAINLEVEL: NO PAIN (0)

## 2024-10-07 NOTE — LETTER
10/7/2024      Maryclement Anayarowan Lopez  [address] [place] [address]      Dear Colleague,    Thank you for referring your patient, Mary Lopez, to the [facility] [place]. Please see a copy of my visit note below.    [facility] Hepatology    Follow-up Visit    Follow-up visit for liver transplant    Subjective:  53 year old male    OLT 3/5/2024  - ETOH/?MASLD/A1AT MZ heterozygote/C282Y heterozygote   - doreen-op MELD 3.0= 31  - donor- regional/donor age 32/DBD/SCD/donor CMV(-)/recipient CMV(-)  - operation- CiT 5:26, EBL 3L, end-to-end IVC, duct-to-duct biliary anastomosis  - post-op course- mod to severe ACR 3/15/2024, delirium of unclear etiology, retained biliary stent, anastomotic stricture, inguinal hernia repair 9/18/2024  - immunosuppression- CyA, MpA 180    Patient comes to clinic this morning with his wife.    Last visit July 2024.  Patient underwent EGD 7/12/2024 which did not show ability stent and spite of AXR findings.  Patient had repeat AXR which showed persistent presence of a stent.  CT abdomen pelvis obtained on 7/24/2024 showed a biliary stent coiled within the extrahepatic common bile duct.  Patient underwent ERCP 8/13/2024 with removal of said stent but placement of biliary stent for anastomotic stricture, and placement of prophylactic pancreatic duct stent).  Patient underwent open inguinal hernia repair 9/18/2024.  No ER visits or hospital mission since last visit.    Patient is well today.  He has some residual abdominal pain from his hernia surgery.  Patient denies jaundice or lower extremity edema.    Patient reports occasional mild tremors and headaches.  He denies any confusion.    Patient denies any nausea, vomiting, diarrhea or constipation.    Patient denies any fevers, sweats or chills.  He is scheduled for his COVID 19 and influenza vaccinations tomorrow.    Weight is increased by 13 pounds since last visit.  Appetite is good.   Patient reports that he is gaining muscle mass back since his transplant.    Blood sugar control is suboptimal.  Patient's fasting blood sugars have been running around 200s at this time.  Patient reports that he has developed a sweet tooth since his transplant.    Patient is fully adherent to his medications.  He has no issues obtaining his medications through his pharmacy or insurance.    Patient does not drink alcohol.      Medical hx Surgical hx   Past Medical History:   Diagnosis Date     ANGEL (acute kidney injury) (H)      Alcoholic cirrhosis of liver without ascites (H) 07/13/2023     Alcoholic hepatitis with ascites (H) 10/03/2023     Alcoholic hepatitis without ascites (H) 07/13/2023     CKD stage 3a, GFR 45-59 ml/min (H) 08/08/2024     Closed fracture of one rib of left side 09/14/2023     Concussion without loss of consciousness 03/11/2020     Decompensated hepatic cirrhosis (H) 09/15/2023     Diabetes mellitus, type 2 (H) 11/22/2023     Essential hypertension 03/11/2020     Ganglion cyst of wrist, right 04/18/2024     History of biliary duct stent placement 09/05/2024     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)      Liver replaced by transplant (H) 03/05/2024     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    ACR PRAJAPATI 6-7/9, treated with steroids     Mild hyperlipidemia 12/07/2021     Persistent insomnia 07/13/2023     Portal hypertension (H) 07/13/2023     Right inguinal hernia 08/08/2024     Scrotal abscess      Secondary esophageal 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 (H) 07/13/2023     Tobacco abuse disorder 03/11/2020     Type 2 diabetes mellitus with hyperglycemia (H) 12/22/2023      Past Surgical History:   Procedure Laterality Date     BENCH LIVER  3/5/2024    Procedure: Bench liver;  Surgeon: Ryder Cortez MD;  Location: [facility] OR     CHOLECYSTECTOMY       COLONOSCOPY N/A 1/2/2024    Procedure: COLONOSCOPY, WITH POLYPECTOMY;  Surgeon: Jak Urbina MD;  Location: [facility]     CV RIGHT HEART  CATH MEASUREMENTS RECORDED N/A 2024    Procedure: Right Heart Catheterization;  Surgeon: Alfred Tafoya MD;  Location: [facility] HE[facility] CARDIAC CATH LAB     ENDOSCOPIC RETROGRADE CHOLANGIOPANCREATOGRAPHY, EXCHANGE TUBE/STENT N/A 2024    Procedure: Endoscopic Retrograde Cholangiopancreatography with biliary sphincterotomy, balloon dilation, pancreatic stent placement,biliary stent exchange;  Surgeon: Naldo Rodriguez MD;  Location: [facility] OR     ENTEROSCOPY SMALL BOWEL N/A 2024    Procedure: Enteroscopy small bowel;  Surgeon: Hugo Daigle MD;  Location: [facility] GI     ESOPHAGOSCOPY, GASTROSCOPY, DUODENOSCOPY (EGD), COMBINED N/A 2024    Procedure: Esophagoscopy, gastroscopy, duodenoscopy (EGD), combined;  Surgeon: Hugo Daigle MD;  Location: [facility] GI     HERNIORRHAPHY INGUINAL Right 2024    Procedure: Open Inguinal Hernia Repair;  Surgeon: Ryder Cortez MD;  Location: [facility] OR     IR LIVER BIOPSY PERCUTANEOUS  3/15/2024     IR RETROPERITONEAL ABSCESS DRAINAGE  2024     TONSILLECTOMY       TRANSPLANT LIVER RECIPIENT  DONOR N/A 3/5/2024    Procedure: Transplant liver recipient  donor, bile duct stent placement;  Surgeon: Ryder Cortez MD;  Location: [facility] OR     VASECTOMY            Medications  Prior to Admission medications    Medication Sig Start Date End Date Taking? Authorizing Provider   blood glucose (NO BRAND SPECIFIED) test strip Use to test blood sugar two times daily or as directed. To accompany: Blood Glucose Monitor Brands: per insurance. 10/24/23  Yes Jimmie Hoyt MD   blood glucose monitoring (NO BRAND SPECIFIED) meter device kit Use to test blood sugar twice times daily or as directed. Preferred blood glucose meter OR supplies to accompany: Blood Glucose Monitor Brands: per insurance. 10/24/23  Yes Jimmie Hoyt MD   Continuous Glucose Sensor (DEXCOM G7 SENSOR) MISC Change every 10 days. 24  Yes Jimmie Hoyt MD    cycloSPORINE modified (GENERIC EQUIVALENT) 100 MG capsule Take 1 capsule (100 mg) by mouth every 12 hours. Total dose 125mg BID 9/27/24  Yes Ryder Cortez MD   cycloSPORINE modified (GENERIC EQUIVALENT) 25 MG capsule Take 1 capsule (25 mg) by mouth every 12 hours. Total dose 125mg BID 9/27/24  Yes Ryder Cortez MD   empagliflozin (JARDIANCE) 25 MG TABS tablet Take 1 tablet (25 mg) by mouth daily 6/6/24  Yes Geovanna Ferreira PA-C   fludrocortisone (FLORINEF) 0.1 MG tablet Take 1 tablet (0.1 mg) by mouth daily 8/7/24  Yes Hugo Daigle MD   gabapentin (NEURONTIN) 300 MG capsule Take 1 capsule (300 mg) by mouth 2 times daily. 9/23/24  Yes Ryder Cortez MD   Glucagon (GVOKE HYPOPEN) 1 MG/0.2ML pen Inject the contents of 1 device under the skin into lower abdomen, outer thigh, or outer upper arm as needed for hypoglycemia. If no response after 15 minutes, additional 1 mg dose from a new device may be injected while waiting for emergency assistance. 3/21/24  Yes Lesley Wise PA-C   Injection Device for insulin (CEQUR SIMPLICITY 2U) KHUSHBOO 1 each every 3 days 6/18/24  Yes Geovanna Ferreira PA-C   insulin degludec (TRESIBA FLEXTOUCH) 100 UNIT/ML pen Inject 16 units subcutaneous each am. Please do NOT fill today ( 9/4/2024 ). 9/4/24  Yes Geovanna Ferreira PA-C   Insulin Lispro-aabc, 1 U Dial, (LYUMJEV KWIKPEN) 100 UNIT/ML SOPN Inject 6 Units Subcutaneous 3 times daily (with meals) 6 units with meals, plus correction. Pt may use up to 30 units daily. This REPLACES Humalog/Novolog insulin. 6/20/24  Yes Geovanna Ferreira PA-C   insulin pen needle (29G X 12.7MM) 29G X 12.7MM miscellaneous Use 1 pen needle every three days or as directed. 6/18/24  Yes Geovanna Ferreira PA-C   insulin pen needle (32G X 4 MM) 32G X 4 MM miscellaneous Use as directed by provider Per insurance coverage 3/21/24  Yes Lesley Wise PA-C   insulin pen needle (BD PEN NEEDLE SHERRIE  "2ND GEN) 32G X 4 MM miscellaneous USES 5 PER DAY 1/8/24  Yes Jimmie Hoyt MD   Magnesium Bisglycinate (MAG GLYCINATE) 100 MG TABS Take 1 tablet (100 mg) by mouth 2 times daily 8/19/24  Yes Hugo Daigle MD   multivitamin w/minerals (THERA-VIT-M) tablet Take 1 tablet by mouth daily. 9/11/24  Yes Yayo Slaughter MD   mycophenolic acid (GENERIC EQUIVALENT) 180 MG EC tablet Take 1 tablet (180 mg) by mouth 2 times daily. 8/23/24  Yes Ryder Cortez MD   omeprazole (PRILOSEC) 20 MG DR capsule Take 20 mg by mouth daily 8/3/23  Yes Jimmie Hoyt MD   sodium zirconium cyclosilicate (LOKELMA) 10 g PACK packet Take 1 packet (10 g) by mouth daily as needed (Hyperkalemia) 6/27/24  Yes Fabi Aguirre NP   thin (NO BRAND SPECIFIED) lancets Use with lanceting device. To accompany: Blood Glucose Monitor Brands: per insurance. 10/24/23  Yes Jimmie Hoyt MD   ACE/ARB/ARNI NOT PRESCRIBED (INTENTIONAL) Please choose reason not prescribed from choices below.    Jimmie Hoyt MD   melatonin 5 MG tablet Take 1 tablet (5 mg) by mouth daily (with dinner) Take daily at dusk.  Patient not taking: Reported on 10/7/2024 6/27/24   Fabi Aguirre NP       Allergies  Allergies   Allergen Reactions     Other Food Allergy Anaphylaxis     Legumes (black beans, baked beans, chickpeas)     Pineapple Itching       Review of systems  A 10-point review of systems was negative    Examination  /87 (BP Location: Right arm, Patient Position: Sitting, Cuff Size: Adult Regular)   Pulse 69   Temp 98.3  F (36.8  C) (Oral)   Resp 18   Ht 1.778 m (5' 10\")   Wt 91.2 kg (201 lb 1.6 oz)   SpO2 99%   BMI 28.85 kg/m    Body mass index is 28.85 kg/m .    Gen- well, NAD, A+Ox3, normal color  CVS- RRR  RS- CTA  Abd- OLT scar, RLQ scar, SNT, BS+  Extr- hands normal, no DANILO  Skin- no rash or jaundice  Neuro- no tremor  Psych- normal mood    Laboratory  Lab Results   Component Value Date     09/29/2024     " 07/05/2020    POTASSIUM 4.2 09/29/2024    POTASSIUM 4.1 03/05/2024    POTASSIUM 3.8 03/12/2022    POTASSIUM 4.2 07/05/2020    CHLORIDE 102 09/29/2024    CHLORIDE 101 03/12/2022    CHLORIDE 102 07/05/2020    CO2 27 09/29/2024    CO2 25 03/12/2022    CO2 28 07/05/2020    BUN 19.4 09/29/2024    BUN 11 03/12/2022    BUN 13 07/05/2020    CR 1.38 09/29/2024    CR 0.95 07/05/2020       Lab Results   Component Value Date    BILITOTAL 0.3 09/29/2024    BILITOTAL 0.8 12/19/2006    ALT 32 09/29/2024    ALT 50 12/19/2006    AST 38 09/29/2024    AST 52 12/19/2006    ALKPHOS 232 09/29/2024    ALKPHOS 117 12/19/2006       Lab Results   Component Value Date    ALBUMIN 3.7 09/29/2024    ALBUMIN 4.0 09/03/2022    ALBUMIN 5.2 12/19/2006    PROTTOTAL 6.8 09/29/2024    PROTTOTAL 9.7 12/19/2006        Lab Results   Component Value Date    WBC 5.7 10/07/2024    WBC 5.7 10/07/2024    WBC 10.8 12/19/2006    HGB 11.7 10/07/2024    HGB 16.9 12/19/2006    MCV 90 10/07/2024    MCV 85 12/19/2006     10/07/2024     12/19/2006       Lab Results   Component Value Date    INR 1.12 08/13/2024       Radiology  EGD 7/12/2024 reviewed  CT A/P 7/24/2024 reviewed  ERCP 8/13/2024 reviewed    Assessment  53 year old male who presents for follow-up of liver transplant complicated with anastomotic biliary stricture.  Liver function tests stable on current immunosuppression.  Will taper off mycophenolate acid now given that anastomotic stricture is now being managed.  Will try to taper off fludrocortisone as patient's blood pressures are elevated.  Patient continues to make good functional progress since undergoing his liver transplant in March 2024.    Plan  Liver transplant  - continue CyA, target trough 100-150  - taper off MpA  - labs per protocol    Anastomotic stricture  - ERCP as scheduled    Hypertension  - stop fludrocortisone  - monitor home BP    Follow-up in 6 months    Hugo Muniz MD  Hepatology  [facility] spent 40  minutes on the date of the encounter doing chart review, history and exam, documentation and further activities as noted above.       Again, thank you for allowing me to participate in the care of your patient.        Sincerely,        Hugo Muniz MD

## 2024-10-07 NOTE — PROGRESS NOTES
REsult note to Mary as creat, glucose and alk phos are up:    ERCP scheduled for 10/22.  Blood sugar high which dehydrates you, causes creatinine to go up.  Make sure you are drinking 6-8 glasses of water / day.  Contact endocrine or PCP if you need help w/ glucose management.

## 2024-10-07 NOTE — NURSING NOTE
"Chief Complaint   Patient presents with    RECHECK     post liver transplant 3/5/2024     Vital signs:  Temp: 98.3  F (36.8  C) Temp src: Oral BP: 131/87 Pulse: 69   Resp: 18 SpO2: 99 %     Height: 177.8 cm (5' 10\") Weight: 91.2 kg (201 lb 1.6 oz)  Estimated body mass index is 28.85 kg/m  as calculated from the following:    Height as of this encounter: 1.778 m (5' 10\").    Weight as of this encounter: 91.2 kg (201 lb 1.6 oz).      Cindy Skelton, [facility]  10/7/2024 8:03 AM    "

## 2024-10-07 NOTE — TELEPHONE ENCOUNTER
Per Dr. Muniz:    CSA goal 100-150, taper off MPA, stop fludrocortisone, RTC 6 mos   Today's CSA level 104.  No changes to dose.     Called Mary to make sure they are aware of med changes.   He is aware of stopping fludrocortisone.  He will decrease myfortic to 180 mg daily for a week, then stop.  Needs q 2 week labs x 2, if good at that time will go to monthly labs.  He has made a follow-up appt w/ Dr. Leventhal.

## 2024-10-07 NOTE — PROGRESS NOTES
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    Last visit July 2024.  Patient underwent EGD 7/12/2024 which did not show ability stent and spite of AXR findings.  Patient had repeat AXR which showed persistent presence of a stent.  CT abdomen pelvis obtained on 7/24/2024 showed a biliary stent coiled within the extrahepatic common bile duct.  Patient underwent ERCP 8/13/2024 with removal of said stent but placement of biliary stent for anastomotic stricture, and placement of prophylactic pancreatic duct stent).  Patient underwent open inguinal hernia repair 9/18/2024.  No ER visits or hospital mission since last visit.    Patient is well today.  He has some residual abdominal pain from his hernia surgery.  Patient denies jaundice or lower extremity edema.    Patient reports occasional mild tremors and headaches.  He denies any confusion.    Patient denies any nausea, vomiting, diarrhea or constipation.    Patient denies any fevers, sweats or chills.  He is scheduled for his COVID 19 and influenza vaccinations tomorrow.    Weight is increased by 13 pounds since last visit.  Appetite is good.  Patient reports that he is gaining muscle mass back since his transplant.    Blood sugar control is suboptimal.  Patient's fasting blood sugars have been running around 200s at this time.  Patient reports that he has developed a sweet tooth since his  transplant.    Patient is fully adherent to his medications.  He has no issues obtaining his medications through his pharmacy or insurance.    Patient does not drink alcohol.      Medical hx Surgical hx   Past Medical History:   Diagnosis Date    ANGEL (acute kidney injury) (H)     Alcoholic cirrhosis of liver without ascites (H) 07/13/2023    Alcoholic hepatitis with ascites (H) 10/03/2023    Alcoholic hepatitis without ascites (H) 07/13/2023    CKD stage 3a, GFR 45-59 ml/min (H) 08/08/2024    Closed fracture of one rib of left side 09/14/2023    Concussion without loss of consciousness 03/11/2020    Decompensated hepatic cirrhosis (H) 09/15/2023    Diabetes mellitus, type 2 (H) 11/22/2023    Essential hypertension 03/11/2020    Ganglion cyst of wrist, right 04/18/2024    History of biliary duct stent placement 09/05/2024    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)     Liver replaced by transplant (H) 03/05/2024    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    ACR PRAJAPATI 6-7/9, treated with steroids    Mild hyperlipidemia 12/07/2021    Persistent insomnia 07/13/2023    Portal hypertension (H) 07/13/2023    Right inguinal hernia 08/08/2024    Scrotal abscess     Secondary esophageal 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 (H) 07/13/2023    Tobacco abuse disorder 03/11/2020    Type 2 diabetes mellitus with hyperglycemia (H) 12/22/2023      Past Surgical History:   Procedure Laterality Date    BENCH LIVER  3/5/2024    Procedure: Bench liver;  Surgeon: Ryder Cortez MD;  Location: [facility] OR    CHOLECYSTECTOMY      COLONOSCOPY N/A 1/2/2024    Procedure: COLONOSCOPY, WITH POLYPECTOMY;  Surgeon: Jak Urbina MD;  Location: [facility]    CV RIGHT HEART CATH MEASUREMENTS RECORDED N/A 1/30/2024    Procedure: Right Heart Catheterization;  Surgeon: Alfred Tafoya MD;  Location:  HEART CARDIAC CATH LAB    ENDOSCOPIC RETROGRADE CHOLANGIOPANCREATOGRAPHY, EXCHANGE TUBE/STENT N/A 8/13/2024    Procedure: Endoscopic Retrograde  Cholangiopancreatography with biliary sphincterotomy, balloon dilation, pancreatic stent placement,biliary stent exchange;  Surgeon: Naldo Rodriguez MD;  Location: [facility] OR    ENTEROSCOPY SMALL BOWEL N/A 2024    Procedure: Enteroscopy small bowel;  Surgeon: Hugo Daigle MD;  Location: [facility] GI    ESOPHAGOSCOPY, GASTROSCOPY, DUODENOSCOPY (EGD), COMBINED N/A 2024    Procedure: Esophagoscopy, gastroscopy, duodenoscopy (EGD), combined;  Surgeon: Hugo Daigle MD;  Location: [facility] GI    HERNIORRHAPHY INGUINAL Right 2024    Procedure: Open Inguinal Hernia Repair;  Surgeon: Ryder Cortez MD;  Location: [facility] OR    IR LIVER BIOPSY PERCUTANEOUS  3/15/2024    IR RETROPERITONEAL ABSCESS DRAINAGE  2024    TONSILLECTOMY      TRANSPLANT LIVER RECIPIENT  DONOR N/A 3/5/2024    Procedure: Transplant liver recipient  donor, bile duct stent placement;  Surgeon: Ryder Cortez MD;  Location: [facility] OR    VASECTOMY            Medications  Prior to Admission medications    Medication Sig Start Date End Date Taking? Authorizing Provider   blood glucose (NO BRAND SPECIFIED) test strip Use to test blood sugar two times daily or as directed. To accompany: Blood Glucose Monitor Brands: per insurance. 10/24/23  Yes Jimmie Hoyt MD   blood glucose monitoring (NO BRAND SPECIFIED) meter device kit Use to test blood sugar twice times daily or as directed. Preferred blood glucose meter OR supplies to accompany: Blood Glucose Monitor Brands: per insurance. 10/24/23  Yes Jimmie Hoyt MD   Continuous Glucose Sensor (DEXCOM G7 SENSOR) MISC Change every 10 days. 24  Yes Jimmie Hoyt MD   cycloSPORINE modified (GENERIC EQUIVALENT) 100 MG capsule Take 1 capsule (100 mg) by mouth every 12 hours. Total dose 125mg BID 24  Yes Ryder Cortez MD   cycloSPORINE modified (GENERIC EQUIVALENT) 25 MG capsule Take 1 capsule (25 mg) by mouth every 12 hours. Total dose 125mg BID  9/27/24  Yes Ryder Cortez MD   empagliflozin (JARDIANCE) 25 MG TABS tablet Take 1 tablet (25 mg) by mouth daily 6/6/24  Yes Geovanna Ferreira PA-C   fludrocortisone (FLORINEF) 0.1 MG tablet Take 1 tablet (0.1 mg) by mouth daily 8/7/24  Yes Hugo Daigle MD   gabapentin (NEURONTIN) 300 MG capsule Take 1 capsule (300 mg) by mouth 2 times daily. 9/23/24  Yes Ryder Cortez MD   Glucagon (GVOKE HYPOPEN) 1 MG/0.2ML pen Inject the contents of 1 device under the skin into lower abdomen, outer thigh, or outer upper arm as needed for hypoglycemia. If no response after 15 minutes, additional 1 mg dose from a new device may be injected while waiting for emergency assistance. 3/21/24  Yes Lesley Wise PA-C   Injection Device for insulin (CEQUR SIMPLICITY 2U) KHUSHBOO 1 each every 3 days 6/18/24  Yes Geovanna Ferreira PA-C   insulin degludec (TRESIBA FLEXTOUCH) 100 UNIT/ML pen Inject 16 units subcutaneous each am. Please do NOT fill today ( 9/4/2024 ). 9/4/24  Yes Geovanna Ferreira PA-C   Insulin Lispro-aabc, 1 U Dial, (LYUMJEV KWIKPEN) 100 UNIT/ML SOPN Inject 6 Units Subcutaneous 3 times daily (with meals) 6 units with meals, plus correction. Pt may use up to 30 units daily. This REPLACES Humalog/Novolog insulin. 6/20/24  Yes Geovanna Ferreira PA-C   insulin pen needle (29G X 12.7MM) 29G X 12.7MM miscellaneous Use 1 pen needle every three days or as directed. 6/18/24  Yes Geovanna Ferreira PA-C   insulin pen needle (32G X 4 MM) 32G X 4 MM miscellaneous Use as directed by provider Per insurance coverage 3/21/24  Yes Lesley Wise PA-C   insulin pen needle (BD PEN NEEDLE SHERRIE 2ND GEN) 32G X 4 MM miscellaneous USES 5 PER DAY 1/8/24  Yes Jimmie Hoyt MD   Magnesium Bisglycinate (MAG GLYCINATE) 100 MG TABS Take 1 tablet (100 mg) by mouth 2 times daily 8/19/24  Yes Hugo Daigle MD   multivitamin w/minerals (THERA-VIT-M) tablet Take 1 tablet by mouth daily.  "9/11/24  Yes Yayo Slaughter MD   mycophenolic acid (GENERIC EQUIVALENT) 180 MG EC tablet Take 1 tablet (180 mg) by mouth 2 times daily. 8/23/24  Yes Ryder Cortez MD   omeprazole (PRILOSEC) 20 MG DR capsule Take 20 mg by mouth daily 8/3/23  Yes Jimmie Hoyt MD   sodium zirconium cyclosilicate (LOKELMA) 10 g PACK packet Take 1 packet (10 g) by mouth daily as needed (Hyperkalemia) 6/27/24  Yes Fabi Aguirre NP   thin (NO BRAND SPECIFIED) lancets Use with lanceting device. To accompany: Blood Glucose Monitor Brands: per insurance. 10/24/23  Yes Jimmie Hoyt MD   ACE/ARB/ARNI NOT PRESCRIBED (INTENTIONAL) Please choose reason not prescribed from choices below.    Jimmie Hoyt MD   melatonin 5 MG tablet Take 1 tablet (5 mg) by mouth daily (with dinner) Take daily at dusk.  Patient not taking: Reported on 10/7/2024 6/27/24   Fabi Aguirre NP       Allergies  Allergies   Allergen Reactions    Other Food Allergy Anaphylaxis     Legumes (black beans, baked beans, chickpeas)    Pineapple Itching       Review of systems  A 10-point review of systems was negative    Examination  /87 (BP Location: Right arm, Patient Position: Sitting, Cuff Size: Adult Regular)   Pulse 69   Temp 98.3  F (36.8  C) (Oral)   Resp 18   Ht 1.778 m (5' 10\")   Wt 91.2 kg (201 lb 1.6 oz)   SpO2 99%   BMI 28.85 kg/m    Body mass index is 28.85 kg/m .    Gen- well, NAD, A+Ox3, normal color  CVS- RRR  RS- CTA  Abd- OLT scar, RLQ scar, SNT, BS+  Extr- hands normal, no DANILO  Skin- no rash or jaundice  Neuro- no tremor  Psych- normal mood    Laboratory  Lab Results   Component Value Date     09/29/2024     07/05/2020    POTASSIUM 4.2 09/29/2024    POTASSIUM 4.1 03/05/2024    POTASSIUM 3.8 03/12/2022    POTASSIUM 4.2 07/05/2020    CHLORIDE 102 09/29/2024    CHLORIDE 101 03/12/2022    CHLORIDE 102 07/05/2020    CO2 27 09/29/2024    CO2 25 03/12/2022    CO2 28 07/05/2020    BUN 19.4 09/29/2024    BUN 11 " 03/12/2022    BUN 13 07/05/2020    CR 1.38 09/29/2024    CR 0.95 07/05/2020       Lab Results   Component Value Date    BILITOTAL 0.3 09/29/2024    BILITOTAL 0.8 12/19/2006    ALT 32 09/29/2024    ALT 50 12/19/2006    AST 38 09/29/2024    AST 52 12/19/2006    ALKPHOS 232 09/29/2024    ALKPHOS 117 12/19/2006       Lab Results   Component Value Date    ALBUMIN 3.7 09/29/2024    ALBUMIN 4.0 09/03/2022    ALBUMIN 5.2 12/19/2006    PROTTOTAL 6.8 09/29/2024    PROTTOTAL 9.7 12/19/2006        Lab Results   Component Value Date    WBC 5.7 10/07/2024    WBC 5.7 10/07/2024    WBC 10.8 12/19/2006    HGB 11.7 10/07/2024    HGB 16.9 12/19/2006    MCV 90 10/07/2024    MCV 85 12/19/2006     10/07/2024     12/19/2006       Lab Results   Component Value Date    INR 1.12 08/13/2024       Radiology  EGD 7/12/2024 reviewed  CT A/P 7/24/2024 reviewed  ERCP 8/13/2024 reviewed    Assessment  53 year old male who presents for follow-up of liver transplant complicated with anastomotic biliary stricture.  Liver function tests stable on current immunosuppression.  Will taper off mycophenolate acid now given that anastomotic stricture is now being managed.  Will try to taper off fludrocortisone as patient's blood pressures are elevated.  Patient continues to make good functional progress since undergoing his liver transplant in March 2024.    Plan  Liver transplant  - continue CyA, target trough 100-150  - taper off MpA  - labs per protocol    Anastomotic stricture  - ERCP as scheduled    Hypertension  - stop fludrocortisone  - monitor home BP    Follow-up in 6 months    Hugo Muniz MD  Hepatology  [facility]    I spent 40 minutes on the date of the encounter doing chart review, history and exam, documentation and further activities as noted above.

## 2024-10-08 ENCOUNTER — OFFICE VISIT (OUTPATIENT)
Dept: FAMILY MEDICINE | Facility: CLINIC | Age: 54
End: 2024-10-08
Payer: COMMERCIAL

## 2024-10-08 VITALS
HEIGHT: 68 IN | OXYGEN SATURATION: 99 % | SYSTOLIC BLOOD PRESSURE: 123 MMHG | HEART RATE: 77 BPM | WEIGHT: 199.8 LBS | RESPIRATION RATE: 10 BRPM | TEMPERATURE: 98.6 F | BODY MASS INDEX: 30.28 KG/M2 | DIASTOLIC BLOOD PRESSURE: 83 MMHG

## 2024-10-08 DIAGNOSIS — Z94.4 LIVER REPLACED BY TRANSPLANT (H): Chronic | ICD-10-CM

## 2024-10-08 DIAGNOSIS — K86.9 DIABETES MELLITUS ASSOCIATED WITH PANCREATIC DISEASE (H): ICD-10-CM

## 2024-10-08 DIAGNOSIS — E11.69 DIABETES MELLITUS ASSOCIATED WITH PANCREATIC DISEASE (H): ICD-10-CM

## 2024-10-08 DIAGNOSIS — E11.9 TYPE 2 DIABETES MELLITUS WITHOUT COMPLICATION, WITH LONG-TERM CURRENT USE OF INSULIN (H): Chronic | ICD-10-CM

## 2024-10-08 DIAGNOSIS — Z79.4 TYPE 2 DIABETES MELLITUS WITHOUT COMPLICATION, WITH LONG-TERM CURRENT USE OF INSULIN (H): Chronic | ICD-10-CM

## 2024-10-08 DIAGNOSIS — F10.91 ALCOHOL USE DISORDER IN REMISSION: ICD-10-CM

## 2024-10-08 DIAGNOSIS — Z87.19 STATUS POST RIGHT INGUINAL HERNIA REPAIR: ICD-10-CM

## 2024-10-08 DIAGNOSIS — Z98.890 STATUS POST RIGHT INGUINAL HERNIA REPAIR: ICD-10-CM

## 2024-10-08 DIAGNOSIS — D84.9 IMMUNOSUPPRESSED STATUS (H): Chronic | ICD-10-CM

## 2024-10-08 DIAGNOSIS — Z98.890 HISTORY OF BILIARY DUCT STENT PLACEMENT: ICD-10-CM

## 2024-10-08 DIAGNOSIS — Z01.818 PRE-OP EXAM: Primary | ICD-10-CM

## 2024-10-08 DIAGNOSIS — Z23 ENCOUNTER FOR IMMUNIZATION: ICD-10-CM

## 2024-10-08 DIAGNOSIS — G47.00 PERSISTENT INSOMNIA: Chronic | ICD-10-CM

## 2024-10-08 DIAGNOSIS — N18.31 CKD STAGE 3A, GFR 45-59 ML/MIN (H): ICD-10-CM

## 2024-10-08 PROBLEM — K40.90 RIGHT INGUINAL HERNIA: Status: RESOLVED | Noted: 2024-08-08 | Resolved: 2024-10-08

## 2024-10-08 PROCEDURE — 90673 RIV3 VACCINE NO PRESERV IM: CPT | Performed by: INTERNAL MEDICINE

## 2024-10-08 PROCEDURE — 90480 ADMN SARSCOV2 VAC 1/ONLY CMP: CPT | Performed by: INTERNAL MEDICINE

## 2024-10-08 PROCEDURE — 91320 SARSCV2 VAC 30MCG TRS-SUC IM: CPT | Performed by: INTERNAL MEDICINE

## 2024-10-08 PROCEDURE — 99214 OFFICE O/P EST MOD 30 MIN: CPT | Mod: 24 | Performed by: INTERNAL MEDICINE

## 2024-10-08 PROCEDURE — 90471 IMMUNIZATION ADMIN: CPT | Performed by: INTERNAL MEDICINE

## 2024-10-08 NOTE — PROGRESS NOTES
Preoperative Evaluation  [facility]  [address] [place] [id number]  Phone: [phone]  Primary Provider: Jimmie Hoyt MD  Pre-op Performing Provider: Jimmie Hoyt MD  Oct 8, 2024             10/8/2024   Surgical Information   What procedure is being done? ERCP with stent removal. ERCP   Facility or Hospital where procedure/surgery will be performed: Same-day surgery [facility]   Who is doing the procedure / surgery?  Dr. Naldo Rodriguez   Date of surgery / procedure: 10/22/2024 10/22/24   Time of surgery / procedure: 10:20 AM unknown   Where do you plan to recover after surgery?  Home at home with family        Fax number for surgical facility: Note does not need to be faxed, will be available electronically in Epic.    Assessment & Plan     1.  Preop physical exam completed.  Patient is medically optimized to undergo the planned surgical procedure.  2.  Biliary stent in situ.  Patient to undergo ERCP and removal of stent with or without replacement for bile duct stricture.  3.  Liver replaced by transplant on 3/5/2024.  Clinically doing well.  4.  Immunosuppressive state.  6.  Type 2 diabetes mellitus under reasonable control being managed by endocrinology.  7.  Chronic kidney disease stage III yea remaining stable.  Creatinine 1.6 GFR 51 measured on 10/7/2024.  8.  Mild hyperlipidemia in the past.  9.  Persistent insomnia doing much better.  10.  Alcohol use disorder in remission.  11.  Status post right inguinal hernia repair on 9/18/2024.  Recovered well.      The proposed surgical procedure is considered LOW risk.    Recommendation  Approval given to proceed with proposed procedure, without further diagnostic evaluation.    Elmo Hernandez is a 53 year old, presenting for the following:  Pre-Op Exam          10/8/2024     2:45 PM   Additional Questions   Roomed by Leonor EPPERSON   Accompanied by spouse Rosio          10/8/2024     2:45 PM   Patient Reported Additional Medications   Patient reports taking the following new medications None     HPI related to upcoming procedure:     53-year-old gentleman with known history of liver transplant, diabetes, chronic kidney disease has a biliary duct stent for stricture.  He is to undergo ERCP and removal of the stent.  May require replacement depending on what the stenosis looks like.  Patient overall has been feeling okay.  His eating habits are not the best though that is affecting his glycemic control.  Diabetes is managed by endocrinology clinic.  The patient is sleeping better.  He is not having chest pain or shortness of breath.  Mood has been good.        10/8/2024   Pre-Op Questionnaire   Have you ever had a heart attack or stroke? No   Have you ever had surgery on your heart or blood vessels, such as a stent placement, a coronary artery bypass, or surgery on an artery in your head, neck, heart, or legs? (!) YES    Do you have chest pain with activity? No   Do you have a history of heart failure? No   Do you currently have a cold, bronchitis or symptoms of other infection? No   Do you have a cough, shortness of breath, or wheezing? No   Do you or anyone in your family have previous history of blood clots? No   Do you or does anyone in your family have a serious bleeding problem such as prolonged bleeding following surgeries or cuts? No   Have you ever had problems with anemia or been told to take iron pills? No   Have you had any abnormal blood loss such as black, tarry or bloody stools? No   Have you ever had a blood transfusion? (!) YES   Have you ever had a transfusion reaction? No   Are you willing to have a blood transfusion if it is medically needed before, during, or after your surgery? Yes   Have you or any of your relatives ever had problems with anesthesia? No   Do you have sleep apnea, excessive snoring or daytime drowsiness? No   Do you have any artifical heart  valves or other implanted medical devices like a pacemaker, defibrillator, or continuous glucose monitor? No   Do you have artificial joints? No   Are you allergic to latex? 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     Priority: Medium    Ganglion cyst of wrist, right 04/18/2024     Priority: Medium    Hypomagnesemia 03/21/2024     Priority: Medium    Immunosuppressed status (H) 03/20/2024     Priority: Medium    Liver replaced by transplant (H) 03/05/2024     Priority: Medium    Type 2 diabetes mellitus without complication, with long-term current use of insulin (H) 11/22/2023     Priority: Medium    Persistent insomnia 07/13/2023     Priority: Medium    Mild hyperlipidemia 12/07/2021     Priority: Medium    Alcohol use disorder in remission 03/26/2014     Priority: Medium     Sober since 6/2023        Past Medical History:   Diagnosis Date    ANGEL (acute kidney injury) (H)     Alcoholic cirrhosis of liver without ascites (H) 07/13/2023    Alcoholic hepatitis with ascites (H) 10/03/2023    Alcoholic hepatitis without ascites (H) 07/13/2023    CKD stage 3a, GFR 45-59 ml/min (H) 08/08/2024    Closed fracture of one rib of left side 09/14/2023    Concussion without loss of consciousness 03/11/2020    Decompensated hepatic cirrhosis (H) 09/15/2023    Diabetes mellitus, type 2 (H) 11/22/2023    Essential hypertension 03/11/2020    Ganglion cyst of wrist, right 04/18/2024    History of biliary duct stent placement 09/05/2024    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)     Liver replaced by  transplant (H) 2024    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    ACR PRAJAPATI 6-7/, treated with steroids    Mild hyperlipidemia 2021    Persistent insomnia 2023    Portal hypertension (H) 2023    Right inguinal hernia 2024    Scrotal abscess     Secondary esophageal 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 (H) 2023    Tobacco abuse disorder 2020    Type 2 diabetes mellitus with hyperglycemia (H) 2023     Past Surgical History:   Procedure Laterality Date    BENCH LIVER  3/5/2024    Procedure: Bench liver;  Surgeon: Ryder Cortez MD;  Location: [facility]    CHOLECYSTECTOMY      COLONOSCOPY N/A 2024    Procedure: COLONOSCOPY, WITH POLYPECTOMY;  Surgeon: Jak Urbina MD;  Location: [facility]    CV RIGHT HEART CATH MEASUREMENTS RECORDED N/A 2024    Procedure: Right Heart Catheterization;  Surgeon: Alfred Tafoya MD;  [facility]    ENDOSCOPIC RETROGRADE CHOLANGIOPANCREATOGRAPHY, EXCHANGE TUBE/STENT N/A 2024    Procedure: Endoscopic Retrograde Cholangiopancreatography with biliary sphincterotomy, balloon dilation, pancreatic stent placement,biliary stent exchange;  Surgeon: Naldo Rodriguez MD;  Location: [facility]    ENTEROSCOPY SMALL BOWEL N/A 2024    Procedure: Enteroscopy small bowel;  Surgeon: Hugo Daigle MD;  Location: [facility]    ESOPHAGOSCOPY, GASTROSCOPY, DUODENOSCOPY (EGD), COMBINED N/A 2024    Procedure: Esophagoscopy, gastroscopy, duodenoscopy (EGD), combined;  Surgeon: Hugo Daigle MD;  Location: [facility]    HERNIORRHAPHY INGUINAL Right 2024    Procedure: Open Inguinal Hernia Repair;  Surgeon: Ryder Cortez MD;  Location: [facility]    IR LIVER BIOPSY PERCUTANEOUS  3/15/2024    IR RETROPERITONEAL ABSCESS DRAINAGE  2024    TONSILLECTOMY      TRANSPLANT LIVER RECIPIENT  DONOR N/A 3/5/2024    Procedure: Transplant liver recipient  donor, bile duct stent placement;  Surgeon:  Ryder Cortez MD;  Location: [facility]    VASECTOMY       Current Outpatient Medications   Medication Sig Dispense Refill    ACE/ARB/ARNI NOT PRESCRIBED (INTENTIONAL) Please choose reason not prescribed from choices below.      blood glucose (NO BRAND SPECIFIED) test strip Use to test blood sugar two times daily or as directed. To accompany: Blood Glucose Monitor Brands: per insurance. 100 strip 6    blood glucose monitoring (NO BRAND SPECIFIED) meter device kit Use to test blood sugar twice times daily or as directed. Preferred blood glucose meter OR supplies to accompany: Blood Glucose Monitor Brands: per insurance. 1 kit 0    Continuous Glucose Sensor (DEXCOM G7 SENSOR) MISC Change every 10 days. 3 each 5    cycloSPORINE modified (GENERIC EQUIVALENT) 100 MG capsule Take 1 capsule (100 mg) by mouth every 12 hours. Total dose 125mg BID 60 capsule 11    cycloSPORINE modified (GENERIC EQUIVALENT) 25 MG capsule Take 1 capsule (25 mg) by mouth every 12 hours. Total dose 125mg  capsule 3    empagliflozin (JARDIANCE) 25 MG TABS tablet Take 1 tablet (25 mg) by mouth daily 90 tablet 1    gabapentin (NEURONTIN) 300 MG capsule Take 1 capsule (300 mg) by mouth 2 times daily. 60 capsule 1    Glucagon (GVOKE HYPOPEN) 1 MG/0.2ML pen Inject the contents of 1 device under the skin into lower abdomen, outer thigh, or outer upper arm as needed for hypoglycemia. If no response after 15 minutes, additional 1 mg dose from a new device may be injected while waiting for emergency assistance. 0.4 mL 1    Injection Device for insulin (CEQUR SIMPLICITY 2U) KHUSHBOO 1 each every 3 days 10 each 5    insulin degludec (TRESIBA FLEXTOUCH) 100 UNIT/ML pen Inject 16 units subcutaneous each am. Please do NOT fill today ( 9/4/2024 ). 15 mL 5    Insulin Lispro-aabc, 1 U Dial, (AMNAUMTHEODORAV IRINAPEN) 100 UNIT/ML SOPN Inject 6 Units Subcutaneous 3 times daily (with meals) 6 units with meals, plus correction. Pt may use up to 30 units daily. This  REPLACES Humalog/Novolog insulin. 30 mL 2    insulin pen needle (29G X 12.7MM) 29G X 12.7MM miscellaneous Use 1 pen needle every three days or as directed. 90 each 1    insulin pen needle (32G X 4 MM) 32G X 4 MM miscellaneous Use as directed by provider Per insurance coverage 200 each 1    insulin pen needle (BD PEN NEEDLE SHERRIE 2ND GEN) 32G X 4 MM miscellaneous USES 5 PER  each 11    Magnesium Bisglycinate (MAG GLYCINATE) 100 MG TABS Take 1 tablet (100 mg) by mouth 2 times daily 180 tablet 1    melatonin 5 MG tablet Take 1 tablet (5 mg) by mouth daily (with dinner) Take daily at dusk. (Patient not taking: Reported on 10/7/2024) 30 tablet 2    multivitamin w/minerals (THERA-VIT-M) tablet Take 1 tablet by mouth daily. 90 tablet 0    mycophenolic acid (GENERIC EQUIVALENT) 180 MG EC tablet Take 1 tablet (180 mg) by mouth 2 times daily. 180 tablet 2    omeprazole (PRILOSEC) 20 MG DR capsule Take 20 mg by mouth daily 180 capsule 1    sodium zirconium cyclosilicate (LOKELMA) 10 g PACK packet Take 1 packet (10 g) by mouth daily as needed (Hyperkalemia) 30 each 0    thin (NO BRAND SPECIFIED) lancets Use with lanceting device. To accompany: Blood Glucose Monitor Brands: per insurance. 100 each 6       Allergies   Allergen Reactions    Other Food Allergy Anaphylaxis     Legumes (black beans, baked beans, chickpeas)    Pineapple Itching        Social History     Tobacco Use    Smoking status: Former     Types: Cigarettes     Passive exposure: Never    Smokeless tobacco: Current     Types: Chew     Last attempt to quit: 1/1/2004    Tobacco comments:     Chew daily 1/8 of a tin per day   Substance Use Topics    Alcohol use: Not Currently     Alcohol/week: 12.0 standard drinks of alcohol     Types: 12 Standard drinks or equivalent per week     Comment: Sober since 6/25/2023     Family History   Problem Relation Age of Onset    Anxiety Disorder Mother     Depression Mother     Bipolar Disorder Mother     Chronic Obstructive  "Pulmonary Disease Mother     Lung Cancer Mother 81    Morbid Obesity Father     Diabetes Father     Diabetes Type 2  Brother     Substance Abuse Maternal Grandfather     Substance Abuse Paternal Grandfather     Colon Cancer No family hx of     Liver Disease No family hx of      History   Drug Use Unknown             Review of Systems  Constitutional, HEENT, cardiovascular, pulmonary, GI, , musculoskeletal, neuro, skin, endocrine and psych systems are negative, except as otherwise noted.    Objective    There were no vitals taken for this visit.   Estimated body mass index is 28.85 kg/m  as calculated from the following:    Height as of 10/7/24: 1.778 m (5' 10\").    Weight as of 10/7/24: 91.2 kg (201 lb 1.6 oz).  Physical Exam  GENERAL: alert and no distress  EYES: Eyes grossly normal to inspection, PERRL and conjunctivae and sclerae normal  HENT: ear canals and TM's normal, nose and mouth without ulcers or lesions  NECK: no adenopathy, no asymmetry, masses, or scars  RESP: lungs clear to auscultation - no rales, rhonchi or wheezes  CV: regular rate and rhythm, normal S1 S2, no S3 or S4, no murmur, click or rub, no peripheral edema  ABDOMEN: soft, nontender, no hepatosplenomegaly, no masses and bowel sounds normal  MS: no gross musculoskeletal defects noted, no edema  SKIN: no suspicious lesions or rashes  NEURO: Normal strength and tone, mentation intact and speech normal    Recent Labs   Lab Test 10/07/24  0705 09/29/24  0950 09/24/24  0724 08/19/24  0748 08/13/24  0856 06/22/24  2109 06/21/24  1013 04/01/24  1339 04/01/24  0924 03/31/24  1511 03/31/24  1339   HGB 11.7*  --  12.0*   < > 10.4*   < > 13.9   < >  --    < >  --      --  240   < > 166   < > 170   < >  --    < >  --    INR  --   --   --   --  1.12  --   --   --   --   --  1.13    137 137   < > 140   < > 131*   < >  --    < >  --    POTASSIUM 4.9 4.2 4.7   < > 4.2   < > 7.3*   < >  --    < >  --    CR 1.61* 1.38* 1.62*   < > 1.27*   < > " 2.15*   < >  --    < >  --    A1C  --   --   --   --   --   --  7.3*  --  5.6  --   --     < > = values in this interval not displayed.       Latest Reference Range & Units 10/07/24 07:05   Sodium 135 - 145 mmol/L 137   Potassium 3.4 - 5.3 mmol/L 4.9   Chloride 98 - 107 mmol/L 101   Carbon Dioxide (CO2) 22 - 29 mmol/L 26   Urea Nitrogen 6.0 - 20.0 mg/dL 25.3 (H)   Creatinine 0.67 - 1.17 mg/dL 1.61 (H)   GFR Estimate >60 mL/min/1.73m2 51 (L)   Calcium 8.8 - 10.4 mg/dL 9.6   Anion Gap 7 - 15 mmol/L 10   Magnesium 1.7 - 2.3 mg/dL 2.0   Phosphorus 2.5 - 4.5 mg/dL 4.1   Albumin 3.5 - 5.2 g/dL 3.7   Protein Total 6.4 - 8.3 g/dL 6.8   Alkaline Phosphatase 40 - 150 U/L 301 (H)   ALT 0 - 70 U/L 37   AST 0 - 45 U/L 42   Bilirubin Direct 0.00 - 0.30 mg/dL <0.20   Bilirubin Total <=1.2 mg/dL 0.3   Glucose 70 - 99 mg/dL 356 (H)   (H): Data is abnormally high  (L): Data is abnormally low   Latest Reference Range & Units 10/07/24 07:05   WBC 4.0 - 11.0 10e3/uL  4.0 - 11.0 10e3/uL 5.7  5.7   Hemoglobin 13.3 - 17.7 g/dL 11.7 (L)   Hematocrit 40.0 - 53.0 % 34.6 (L)   Platelet Count 150 - 450 10e3/uL 223   RBC Count 4.40 - 5.90 10e6/uL 3.85 (L)   MCV 78 - 100 fL 90   MCH 26.5 - 33.0 pg 30.4   MCHC 31.5 - 36.5 g/dL 33.8   RDW 10.0 - 15.0 % 12.7   % Neutrophils % 58   % Lymphocytes % 18   % Monocytes % 7   % Eosinophils % 15   % Basophils % 1   Absolute Basophils 0.0 - 0.2 10e3/uL 0.1   (L): Data is abnormally low    Diagnostics     No EKG required for low risk surgery (cataract, skin procedure, breast biopsy, etc).    Revised Cardiac Risk Index (RCRI)  The patient has the following serious cardiovascular risks for perioperative complications:   - No serious cardiac risks = 0 points     RCRI Interpretation: 0 points: Class I (very low risk - 0.4% complication rate)         Signed Electronically by: Jimmie Hoyt MD  A copy of this evaluation report is provided to the requesting physician.

## 2024-10-17 ENCOUNTER — PATIENT OUTREACH (OUTPATIENT)
Dept: CARE COORDINATION | Facility: CLINIC | Age: 54
End: 2024-10-17
Payer: COMMERCIAL

## 2024-10-20 ENCOUNTER — HEALTH MAINTENANCE LETTER (OUTPATIENT)
Age: 54
End: 2024-10-20

## 2024-10-21 ENCOUNTER — MYC MEDICAL ADVICE (OUTPATIENT)
Dept: FAMILY MEDICINE | Facility: CLINIC | Age: 54
End: 2024-10-21

## 2024-10-21 ENCOUNTER — MYC MEDICAL ADVICE (OUTPATIENT)
Dept: TRANSPLANT | Facility: CLINIC | Age: 54
End: 2024-10-21

## 2024-10-21 ENCOUNTER — TELEPHONE (OUTPATIENT)
Dept: TRANSPLANT | Facility: CLINIC | Age: 54
End: 2024-10-21

## 2024-10-21 ENCOUNTER — LAB (OUTPATIENT)
Dept: LAB | Facility: CLINIC | Age: 54
End: 2024-10-21
Payer: COMMERCIAL

## 2024-10-21 DIAGNOSIS — Z94.4 LIVER REPLACED BY TRANSPLANT (H): Chronic | ICD-10-CM

## 2024-10-21 DIAGNOSIS — M25.561 ACUTE PAIN OF RIGHT KNEE: Primary | ICD-10-CM

## 2024-10-21 DIAGNOSIS — Z94.4 S/P LIVER TRANSPLANT (H): ICD-10-CM

## 2024-10-21 DIAGNOSIS — R79.89 ABNORMAL LIVER FUNCTION TESTS: Primary | ICD-10-CM

## 2024-10-21 LAB
ALBUMIN SERPL BCG-MCNC: 3.9 G/DL (ref 3.5–5.2)
ALP SERPL-CCNC: 273 U/L (ref 40–150)
ALT SERPL W P-5'-P-CCNC: 57 U/L (ref 0–70)
ANION GAP SERPL CALCULATED.3IONS-SCNC: 10 MMOL/L (ref 7–15)
AST SERPL W P-5'-P-CCNC: 55 U/L (ref 0–45)
BILIRUB DIRECT SERPL-MCNC: <0.2 MG/DL (ref 0–0.3)
BILIRUB SERPL-MCNC: 0.3 MG/DL
BUN SERPL-MCNC: 24.4 MG/DL (ref 6–20)
CALCIUM SERPL-MCNC: 9.9 MG/DL (ref 8.8–10.4)
CHLORIDE SERPL-SCNC: 104 MMOL/L (ref 98–107)
CREAT SERPL-MCNC: 1.63 MG/DL (ref 0.67–1.17)
CYCLOSPORINE BLD LC/MS/MS-MCNC: 303 UG/L (ref 50–400)
EGFRCR SERPLBLD CKD-EPI 2021: 50 ML/MIN/1.73M2
ERYTHROCYTE [DISTWIDTH] IN BLOOD BY AUTOMATED COUNT: 12.7 % (ref 10–15)
GLUCOSE SERPL-MCNC: 317 MG/DL (ref 70–99)
HCO3 SERPL-SCNC: 25 MMOL/L (ref 22–29)
HCT VFR BLD AUTO: 38.3 % (ref 40–53)
HGB BLD-MCNC: 12.9 G/DL (ref 13.3–17.7)
MAGNESIUM SERPL-MCNC: 1.7 MG/DL (ref 1.7–2.3)
MCH RBC QN AUTO: 29.6 PG (ref 26.5–33)
MCHC RBC AUTO-ENTMCNC: 33.7 G/DL (ref 31.5–36.5)
MCV RBC AUTO: 88 FL (ref 78–100)
PHOSPHATE SERPL-MCNC: 3.7 MG/DL (ref 2.5–4.5)
PLATELET # BLD AUTO: 187 10E3/UL (ref 150–450)
POTASSIUM SERPL-SCNC: 5.2 MMOL/L (ref 3.4–5.3)
PROT SERPL-MCNC: 6.9 G/DL (ref 6.4–8.3)
RBC # BLD AUTO: 4.36 10E6/UL (ref 4.4–5.9)
SODIUM SERPL-SCNC: 139 MMOL/L (ref 135–145)
TME LAST DOSE: NORMAL H
TME LAST DOSE: NORMAL H
WBC # BLD AUTO: 6.3 10E3/UL (ref 4–11)

## 2024-10-21 PROCEDURE — 36415 COLL VENOUS BLD VENIPUNCTURE: CPT

## 2024-10-21 PROCEDURE — 85027 COMPLETE CBC AUTOMATED: CPT

## 2024-10-21 PROCEDURE — 80053 COMPREHEN METABOLIC PANEL: CPT

## 2024-10-21 PROCEDURE — 80158 DRUG ASSAY CYCLOSPORINE: CPT

## 2024-10-21 PROCEDURE — 83735 ASSAY OF MAGNESIUM: CPT

## 2024-10-21 PROCEDURE — 82248 BILIRUBIN DIRECT: CPT

## 2024-10-21 PROCEDURE — 84100 ASSAY OF PHOSPHORUS: CPT

## 2024-10-21 NOTE — TELEPHONE ENCOUNTER
Call to Adina to ask about specifics she would like included in work letter.  Kaylee is not ready to return to work.  We will not compose a letter until his is asked to return.

## 2024-10-21 NOTE — TELEPHONE ENCOUNTER
ISSUE:   Cyclosporine level 303 on 10/21, goal 100-150, dose 125 mg BID. Creatinine is up.    PLAN:   Call Patient and confirm this was an accurate 12-hour trough.   Verify Cyclosporine dose..   Confirm no new medications or or missed doses.   Confirm no new illness / infection / diarrhea.   If accurate trough and accurate dose, decrease Cyclosporine dose to 100 mg BID     Is this more than a 50% increase or decrease in current IS dose: No      Repeat labs in a week     OUTCOME:   Spoke with Rosio, they confirm accurate trough level and current dose 125 mg BID.   Rosio confirmed dose change to 100 mg BID.  Rosio agreed to repeat labs in 1 weeks.   Orders sent to preferred pharmacy for dose change and lab for repeat labs.   Rosio voiced understanding of plan.      Valentina Enamorado LPN

## 2024-10-22 ENCOUNTER — ANESTHESIA (OUTPATIENT)
Dept: SURGERY | Facility: CLINIC | Age: 54
End: 2024-10-22
Payer: COMMERCIAL

## 2024-10-22 ENCOUNTER — HOSPITAL ENCOUNTER (OUTPATIENT)
Facility: CLINIC | Age: 54
Discharge: HOME | End: 2024-10-22
Attending: INTERNAL MEDICINE | Admitting: INTERNAL MEDICINE
Payer: COMMERCIAL

## 2024-10-22 ENCOUNTER — APPOINTMENT (OUTPATIENT)
Dept: GENERAL RADIOLOGY | Facility: CLINIC | Age: 54
End: 2024-10-22
Attending: INTERNAL MEDICINE
Payer: COMMERCIAL

## 2024-10-22 ENCOUNTER — ANESTHESIA EVENT (OUTPATIENT)
Dept: SURGERY | Facility: CLINIC | Age: 54
End: 2024-10-22
Payer: COMMERCIAL

## 2024-10-22 VITALS
TEMPERATURE: 97.8 F | OXYGEN SATURATION: 99 % | WEIGHT: 205.47 LBS | RESPIRATION RATE: 14 BRPM | HEART RATE: 58 BPM | DIASTOLIC BLOOD PRESSURE: 94 MMHG | BODY MASS INDEX: 29.42 KG/M2 | SYSTOLIC BLOOD PRESSURE: 122 MMHG | HEIGHT: 70 IN

## 2024-10-22 LAB
ALBUMIN SERPL BCG-MCNC: 3.9 G/DL (ref 3.5–5.2)
ALP SERPL-CCNC: 232 U/L (ref 40–150)
ALT SERPL W P-5'-P-CCNC: 44 U/L (ref 0–70)
ANION GAP SERPL CALCULATED.3IONS-SCNC: 12 MMOL/L (ref 7–15)
AST SERPL W P-5'-P-CCNC: 48 U/L (ref 0–45)
BILIRUB SERPL-MCNC: 0.3 MG/DL
BUN SERPL-MCNC: 22.6 MG/DL (ref 6–20)
CALCIUM SERPL-MCNC: 9.7 MG/DL (ref 8.8–10.4)
CHLORIDE SERPL-SCNC: 103 MMOL/L (ref 98–107)
CREAT SERPL-MCNC: 1.39 MG/DL (ref 0.67–1.17)
EGFRCR SERPLBLD CKD-EPI 2021: 61 ML/MIN/1.73M2
ERCP: NORMAL
ERYTHROCYTE [DISTWIDTH] IN BLOOD BY AUTOMATED COUNT: 13 % (ref 10–15)
GLUCOSE BLDC GLUCOMTR-MCNC: 177 MG/DL (ref 70–99)
GLUCOSE BLDC GLUCOMTR-MCNC: 180 MG/DL (ref 70–99)
GLUCOSE BLDC GLUCOMTR-MCNC: 198 MG/DL (ref 70–99)
GLUCOSE SERPL-MCNC: 185 MG/DL (ref 70–99)
HCO3 SERPL-SCNC: 21 MMOL/L (ref 22–29)
HCT VFR BLD AUTO: 38.5 % (ref 40–53)
HGB BLD-MCNC: 12.7 G/DL (ref 13.3–17.7)
INR PPP: 1.04 (ref 0.85–1.15)
LIPASE SERPL-CCNC: 9 U/L (ref 13–60)
MCH RBC QN AUTO: 29.7 PG (ref 26.5–33)
MCHC RBC AUTO-ENTMCNC: 33 G/DL (ref 31.5–36.5)
MCV RBC AUTO: 90 FL (ref 78–100)
PLATELET # BLD AUTO: 178 10E3/UL (ref 150–450)
POTASSIUM SERPL-SCNC: 4.5 MMOL/L (ref 3.4–5.3)
PROT SERPL-MCNC: 7 G/DL (ref 6.4–8.3)
RBC # BLD AUTO: 4.28 10E6/UL (ref 4.4–5.9)
SODIUM SERPL-SCNC: 136 MMOL/L (ref 135–145)
WBC # BLD AUTO: 8.2 10E3/UL (ref 4–11)

## 2024-10-22 PROCEDURE — 85610 PROTHROMBIN TIME: CPT | Performed by: INTERNAL MEDICINE

## 2024-10-22 PROCEDURE — 250N000012 HC RX MED GY IP 250 OP 636 PS 637

## 2024-10-22 PROCEDURE — 43276 ERCP STENT EXCHANGE W/DILATE: CPT | Performed by: NURSE ANESTHETIST, CERTIFIED REGISTERED

## 2024-10-22 PROCEDURE — 250N000009 HC RX 250: Performed by: NURSE ANESTHETIST, CERTIFIED REGISTERED

## 2024-10-22 PROCEDURE — 82962 GLUCOSE BLOOD TEST: CPT

## 2024-10-22 PROCEDURE — 710N000012 HC RECOVERY PHASE 2, PER MINUTE: Performed by: INTERNAL MEDICINE

## 2024-10-22 PROCEDURE — 258N000003 HC RX IP 258 OP 636

## 2024-10-22 PROCEDURE — 370N000017 HC ANESTHESIA TECHNICAL FEE, PER MIN: Performed by: INTERNAL MEDICINE

## 2024-10-22 PROCEDURE — C1877 STENT, NON-COAT/COV W/O DEL: HCPCS | Performed by: INTERNAL MEDICINE

## 2024-10-22 PROCEDURE — 360N000082 HC SURGERY LEVEL 2 W/ FLUORO, PER MIN: Performed by: INTERNAL MEDICINE

## 2024-10-22 PROCEDURE — C1726 CATH, BAL DIL, NON-VASCULAR: HCPCS | Performed by: INTERNAL MEDICINE

## 2024-10-22 PROCEDURE — 710N000010 HC RECOVERY PHASE 1, LEVEL 2, PER MIN: Performed by: INTERNAL MEDICINE

## 2024-10-22 PROCEDURE — 258N000003 HC RX IP 258 OP 636: Performed by: NURSE ANESTHETIST, CERTIFIED REGISTERED

## 2024-10-22 PROCEDURE — 43276 ERCP STENT EXCHANGE W/DILATE: CPT | Performed by: INTERNAL MEDICINE

## 2024-10-22 PROCEDURE — 272N000001 HC OR GENERAL SUPPLY STERILE: Performed by: INTERNAL MEDICINE

## 2024-10-22 PROCEDURE — 85014 HEMATOCRIT: CPT | Performed by: INTERNAL MEDICINE

## 2024-10-22 PROCEDURE — 255N000002 HC RX 255 OP 636: Performed by: INTERNAL MEDICINE

## 2024-10-22 PROCEDURE — 999N000141 HC STATISTIC PRE-PROCEDURE NURSING ASSESSMENT: Performed by: INTERNAL MEDICINE

## 2024-10-22 PROCEDURE — 83690 ASSAY OF LIPASE: CPT | Performed by: INTERNAL MEDICINE

## 2024-10-22 PROCEDURE — 250N000024 HC ISOFLURANE, PER MIN: Performed by: INTERNAL MEDICINE

## 2024-10-22 PROCEDURE — 82040 ASSAY OF SERUM ALBUMIN: CPT | Performed by: INTERNAL MEDICINE

## 2024-10-22 PROCEDURE — 250N000011 HC RX IP 250 OP 636: Performed by: NURSE ANESTHETIST, CERTIFIED REGISTERED

## 2024-10-22 PROCEDURE — 999N000179 XR SURGERY CARM FLUORO LESS THAN 5 MIN W STILLS: Mod: TC

## 2024-10-22 PROCEDURE — 36415 COLL VENOUS BLD VENIPUNCTURE: CPT | Performed by: INTERNAL MEDICINE

## 2024-10-22 PROCEDURE — C1769 GUIDE WIRE: HCPCS | Performed by: INTERNAL MEDICINE

## 2024-10-22 DEVICE — A STERILE NON-BIOABSORBABLE TUBULAR DEVICE INTENDED TO BE IMPLANTED IN AN OBSTRUCTED PANCREATIC DUCT (E.G., DUE TO STRICTURE OR MALIGNANCY) TO MAINTAIN LUMINAL PATENCY; IT MAY ALSO BE INTENDED TO TREAT A WIDE VARIETY OF CONDITIONS IN PANCREATIC ENDOSCOPY (E.G., DRAIN PSEUDOCYST, TREAT FISTULA OR DUCT DISRUPTION). IT IS MADE ENTIRELY OF A SYNTHETIC POLYMER AND HAS A CONTINUOUS TUBE DESIGN WITH OR WITHOUT RETENTION FLANGES. THIS IS A SINGLE-USE DEVICE.
Type: IMPLANTABLE DEVICE | Site: BILE DUCT | Status: FUNCTIONAL
Brand: FREEMAN PANCREATIC FLEXI-STENT

## 2024-10-22 RX ORDER — CYCLOSPORINE 100 MG/1
100 CAPSULE, LIQUID FILLED ORAL EVERY 12 HOURS
Qty: 60 CAPSULE | Refills: 11 | Status: SHIPPED | OUTPATIENT
Start: 2024-10-22 | End: 2024-10-28

## 2024-10-22 RX ORDER — ONDANSETRON 4 MG/1
4 TABLET, ORALLY DISINTEGRATING ORAL EVERY 30 MIN PRN
Status: DISCONTINUED | OUTPATIENT
Start: 2024-10-22 | End: 2024-10-22 | Stop reason: HOSPADM

## 2024-10-22 RX ORDER — FENTANYL CITRATE 50 UG/ML
INJECTION, SOLUTION INTRAMUSCULAR; INTRAVENOUS PRN
Status: DISCONTINUED | OUTPATIENT
Start: 2024-10-22 | End: 2024-10-22

## 2024-10-22 RX ORDER — FENTANYL CITRATE 50 UG/ML
50 INJECTION, SOLUTION INTRAMUSCULAR; INTRAVENOUS EVERY 5 MIN PRN
Status: DISCONTINUED | OUTPATIENT
Start: 2024-10-22 | End: 2024-10-22 | Stop reason: HOSPADM

## 2024-10-22 RX ORDER — FLUMAZENIL 0.1 MG/ML
0.2 INJECTION, SOLUTION INTRAVENOUS
Status: CANCELLED | OUTPATIENT
Start: 2024-10-22 | End: 2024-10-22

## 2024-10-22 RX ORDER — NALOXONE HYDROCHLORIDE 0.4 MG/ML
0.1 INJECTION, SOLUTION INTRAMUSCULAR; INTRAVENOUS; SUBCUTANEOUS
Status: DISCONTINUED | OUTPATIENT
Start: 2024-10-22 | End: 2024-10-22 | Stop reason: HOSPADM

## 2024-10-22 RX ORDER — IOPAMIDOL 510 MG/ML
INJECTION, SOLUTION INTRAVASCULAR PRN
Status: DISCONTINUED | OUTPATIENT
Start: 2024-10-22 | End: 2024-10-22 | Stop reason: HOSPADM

## 2024-10-22 RX ORDER — ONDANSETRON 2 MG/ML
4 INJECTION INTRAMUSCULAR; INTRAVENOUS EVERY 30 MIN PRN
Status: DISCONTINUED | OUTPATIENT
Start: 2024-10-22 | End: 2024-10-22 | Stop reason: HOSPADM

## 2024-10-22 RX ORDER — DEXAMETHASONE SODIUM PHOSPHATE 4 MG/ML
4 INJECTION, SOLUTION INTRA-ARTICULAR; INTRALESIONAL; INTRAMUSCULAR; INTRAVENOUS; SOFT TISSUE
Status: DISCONTINUED | OUTPATIENT
Start: 2024-10-22 | End: 2024-10-22 | Stop reason: HOSPADM

## 2024-10-22 RX ORDER — LIDOCAINE HYDROCHLORIDE 20 MG/ML
INJECTION, SOLUTION INFILTRATION; PERINEURAL PRN
Status: DISCONTINUED | OUTPATIENT
Start: 2024-10-22 | End: 2024-10-22

## 2024-10-22 RX ORDER — PROPOFOL 10 MG/ML
INJECTION, EMULSION INTRAVENOUS PRN
Status: DISCONTINUED | OUTPATIENT
Start: 2024-10-22 | End: 2024-10-22

## 2024-10-22 RX ORDER — ONDANSETRON 2 MG/ML
INJECTION INTRAMUSCULAR; INTRAVENOUS PRN
Status: DISCONTINUED | OUTPATIENT
Start: 2024-10-22 | End: 2024-10-22

## 2024-10-22 RX ORDER — HYDROMORPHONE HCL IN WATER/PF 6 MG/30 ML
0.4 PATIENT CONTROLLED ANALGESIA SYRINGE INTRAVENOUS EVERY 5 MIN PRN
Status: DISCONTINUED | OUTPATIENT
Start: 2024-10-22 | End: 2024-10-22 | Stop reason: HOSPADM

## 2024-10-22 RX ORDER — ONDANSETRON 4 MG/1
4 TABLET, ORALLY DISINTEGRATING ORAL EVERY 6 HOURS PRN
Status: CANCELLED | OUTPATIENT
Start: 2024-10-22

## 2024-10-22 RX ORDER — LIDOCAINE 40 MG/G
CREAM TOPICAL
Status: DISCONTINUED | OUTPATIENT
Start: 2024-10-22 | End: 2024-10-22 | Stop reason: HOSPADM

## 2024-10-22 RX ORDER — HYDROMORPHONE HCL IN WATER/PF 6 MG/30 ML
0.2 PATIENT CONTROLLED ANALGESIA SYRINGE INTRAVENOUS EVERY 5 MIN PRN
Status: DISCONTINUED | OUTPATIENT
Start: 2024-10-22 | End: 2024-10-22 | Stop reason: HOSPADM

## 2024-10-22 RX ORDER — FENTANYL CITRATE 50 UG/ML
25 INJECTION, SOLUTION INTRAMUSCULAR; INTRAVENOUS EVERY 5 MIN PRN
Status: DISCONTINUED | OUTPATIENT
Start: 2024-10-22 | End: 2024-10-22 | Stop reason: HOSPADM

## 2024-10-22 RX ORDER — SODIUM CHLORIDE, SODIUM LACTATE, POTASSIUM CHLORIDE, CALCIUM CHLORIDE 600; 310; 30; 20 MG/100ML; MG/100ML; MG/100ML; MG/100ML
INJECTION, SOLUTION INTRAVENOUS CONTINUOUS PRN
Status: DISCONTINUED | OUTPATIENT
Start: 2024-10-22 | End: 2024-10-22

## 2024-10-22 RX ORDER — FENTANYL CITRATE-0.9 % NACL/PF 10 MCG/ML
PLASTIC BAG, INJECTION (ML) INTRAVENOUS PRN
Status: DISCONTINUED | OUTPATIENT
Start: 2024-10-22 | End: 2024-10-22

## 2024-10-22 RX ORDER — OXYCODONE HYDROCHLORIDE 10 MG/1
10 TABLET ORAL
Status: DISCONTINUED | OUTPATIENT
Start: 2024-10-22 | End: 2024-10-22 | Stop reason: HOSPADM

## 2024-10-22 RX ORDER — DEXAMETHASONE SODIUM PHOSPHATE 4 MG/ML
INJECTION, SOLUTION INTRA-ARTICULAR; INTRALESIONAL; INTRAMUSCULAR; INTRAVENOUS; SOFT TISSUE PRN
Status: DISCONTINUED | OUTPATIENT
Start: 2024-10-22 | End: 2024-10-22

## 2024-10-22 RX ORDER — LEVOFLOXACIN 5 MG/ML
INJECTION, SOLUTION INTRAVENOUS PRN
Status: DISCONTINUED | OUTPATIENT
Start: 2024-10-22 | End: 2024-10-22

## 2024-10-22 RX ORDER — OXYCODONE HYDROCHLORIDE 5 MG/1
5 TABLET ORAL
Status: DISCONTINUED | OUTPATIENT
Start: 2024-10-22 | End: 2024-10-22 | Stop reason: HOSPADM

## 2024-10-22 RX ORDER — ONDANSETRON 2 MG/ML
4 INJECTION INTRAMUSCULAR; INTRAVENOUS EVERY 6 HOURS PRN
Status: CANCELLED | OUTPATIENT
Start: 2024-10-22

## 2024-10-22 RX ADMIN — PROPOFOL 40 MG: 10 INJECTION, EMULSION INTRAVENOUS at 08:06

## 2024-10-22 RX ADMIN — FENTANYL CITRATE 100 MCG: 50 INJECTION INTRAMUSCULAR; INTRAVENOUS at 07:59

## 2024-10-22 RX ADMIN — Medication 200 MG: at 08:47

## 2024-10-22 RX ADMIN — PROPOFOL 160 MG: 10 INJECTION, EMULSION INTRAVENOUS at 08:03

## 2024-10-22 RX ADMIN — SODIUM CHLORIDE 2 UNITS: 9 INJECTION, SOLUTION INTRAVENOUS at 09:38

## 2024-10-22 RX ADMIN — ONDANSETRON 4 MG: 2 INJECTION INTRAMUSCULAR; INTRAVENOUS at 08:43

## 2024-10-22 RX ADMIN — Medication 50 MG: at 08:04

## 2024-10-22 RX ADMIN — Medication 100 MCG: at 08:16

## 2024-10-22 RX ADMIN — LIDOCAINE HYDROCHLORIDE 80 MG: 20 INJECTION, SOLUTION INFILTRATION; PERINEURAL at 08:03

## 2024-10-22 RX ADMIN — SODIUM CHLORIDE, POTASSIUM CHLORIDE, SODIUM LACTATE AND CALCIUM CHLORIDE: 600; 310; 30; 20 INJECTION, SOLUTION INTRAVENOUS at 08:01

## 2024-10-22 RX ADMIN — MIDAZOLAM 2 MG: 1 INJECTION INTRAMUSCULAR; INTRAVENOUS at 07:55

## 2024-10-22 RX ADMIN — LEVOFLOXACIN 500 MG: 5 INJECTION, SOLUTION INTRAVENOUS at 08:25

## 2024-10-22 RX ADMIN — Medication 100 MCG: at 08:41

## 2024-10-22 RX ADMIN — Medication 100 MCG: at 08:19

## 2024-10-22 RX ADMIN — DEXAMETHASONE SODIUM PHOSPHATE 4 MG: 4 INJECTION, SOLUTION INTRA-ARTICULAR; INTRALESIONAL; INTRAMUSCULAR; INTRAVENOUS; SOFT TISSUE at 08:03

## 2024-10-22 ASSESSMENT — ACTIVITIES OF DAILY LIVING (ADL)
ADLS_ACUITY_SCORE: 38

## 2024-10-22 NOTE — ANESTHESIA POSTPROCEDURE EVALUATION
Patient: Mary Lopez    Procedure: Procedure(s):  ENDOSCOPIC RETROGRADE CHOLANGIOPANCREATOGRAPHY, WITH bile duct stent stent exchange and balloon dilation       Anesthesia Type:  General    Note:  Disposition: Outpatient   Postop Pain Control: Uneventful            Sign Out: Well controlled pain   PONV: No   Neuro/Psych: Uneventful            Sign Out: Acceptable/Baseline neuro status   Airway/Respiratory: Uneventful            Sign Out: Acceptable/Baseline resp. status   CV/Hemodynamics: Uneventful            Sign Out: Acceptable CV status; No obvious hypovolemia; No obvious fluid overload   Other NRE: NONE   DID A NON-ROUTINE EVENT OCCUR? No           Last vitals:  Vitals Value Taken Time   /84 10/22/24 0915   Temp 36.5  C (97.7  F) 10/22/24 0900   Pulse 57 10/22/24 0920   Resp 8 10/22/24 0920   SpO2 98 % 10/22/24 0920   Vitals shown include unfiled device data.    Electronically Signed By: Cody Landa MD  October 22, 2024  9:20 AM

## 2024-10-22 NOTE — ANESTHESIA CARE TRANSFER NOTE
Patient: Mary Lopez    Procedure: Procedure(s):  ENDOSCOPIC RETROGRADE CHOLANGIOPANCREATOGRAPHY, WITH bile duct stent stent exchange and balloon dilation       Diagnosis: Biliary stricture (H) [K83.1]  Diagnosis Additional Information: No value filed.    Anesthesia Type:   General     Note:    Oropharynx: oropharynx clear of all foreign objects and spontaneously breathing  Level of Consciousness: drowsy  Oxygen Supplementation: room air    Independent Airway: airway patency satisfactory and stable  Dentition: dentition unchanged  Vital Signs Stable: post-procedure vital signs reviewed and stable  Report to RN Given: handoff report given  Patient transferred to: PACU    Handoff Report: Identifed the Patient, Identified the Reponsible Provider, Reviewed the pertinent medical history, Discussed the surgical course, Reviewed Intra-OP anesthesia mangement and issues during anesthesia, Set expectations for post-procedure period and Allowed opportunity for questions and acknowledgement of understanding      Vitals:  Vitals Value Taken Time   BP     Temp     Pulse 70 10/22/24 0900   Resp 12 10/22/24 0900   SpO2 98 % 10/22/24 0900   Vitals shown include unfiled device data.    Electronically Signed By: LESA Allen CRNA  October 22, 2024  9:00 AM

## 2024-10-22 NOTE — DISCHARGE INSTRUCTIONS
Contacting your Doctor -   To contact a doctor, call Dr Rodriguez's clinic at [phone] or:  [phone] and ask for the resident on call for Gastroenterology (answered 24 hours a day)   Emergency Department:  [facility]: [phone] 911 if you are in need of immediate or emergent help   After Anesthesia (Sleep Medicine)  What should I do after anesthesia?  You should rest and relax for the next 24 hours. Avoid risky or difficult (strenuous) activity. A responsible adult should stay with you overnight.  Don't drive or use any heavy equipment for 24 hours. Even if you feel normal, your reactions may be affected by the sleep medicine given to you.  Don't drink alcohol or make any important decisions for 24 hours.  Slowly get back to your regular diet, as you feel able.  How should I expect to feel?  It's normal to feel dizzy, light-headed, or faint for up to a full day after anesthesia or while taking pain medicine. If this happens:   Sit down for a few minutes before standing.  Have someone help you when you get up to walk or use the bathroom.  If you have nausea (feel sick to your stomach) or vomit (throw up):   Drink clear liquids (such as apple juice, ginger ale, broth, or 7UP) until you feel better.  If you feel sick to your stomach, or you keep vomiting for 24 hours, please call the doctor.  What else should I know?  You might have a dry mouth, sore throat, muscle aches, or trouble sleeping. These should go away after 24 hours.  Please contact your doctor if you have any other symptoms that concern you, such as fever, pain, bleeding, fluid drainage, swelling, or headache, or if it's been over 8 to 10 hours and you still aren't able to pee (urinate).  If you have a history of sleep apnea, it's very important to use your CPAP machine for the next 24 hours when you nap or sleep.   For informational purposes only. Not to replace the advice of your health care provider. Copyright   2023 [employer]. All rights reserved. Clinically reviewed by Nael Riojas MD. [employer] [id number] - REV 09/23.

## 2024-10-22 NOTE — OR NURSING
PAR Anesthesiologists at patient bedside. Patient and MD discussed glucose management and goals. Patient will receive 2 units of insulin and then go to Phase II for recheck. Patient able to discharge home as long as blood sugar has not increased. Patient agreeable to plan.

## 2024-10-22 NOTE — ANESTHESIA PREPROCEDURE EVALUATION
Anesthesia Pre-Procedure Evaluation    Patient: Mary Lopez   MRN: [id number] : 1970        Procedure : Procedure(s):  ENDOSCOPIC RETROGRADE CHOLANGIOPANCREATOGRAPHY, WITH TUBE, STENT, OR FOREIGN BODY REMOVAL          Past Medical History:   Diagnosis Date    ANGEL (acute kidney injury) (H)     Alcoholic cirrhosis of liver without ascites (H) 2023    Alcoholic hepatitis with ascites (H) 10/03/2023    Alcoholic hepatitis without ascites (H) 2023    CKD stage 3a, GFR 45-59 ml/min (H) 2024    Closed fracture of one rib of left side 2023    Concussion without loss of consciousness 2020    Decompensated hepatic cirrhosis (H) 09/15/2023    Diabetes mellitus, type 2 (H) 2023    Essential hypertension 2020    Ganglion cyst of wrist, right 2024    History of biliary duct stent placement 2024    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)     Liver replaced by transplant (H) 2024    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    ACR PRAJAPATI 6-7/9, treated with steroids    Mild hyperlipidemia 2021    Persistent insomnia 2023    Portal hypertension (H) 2023    Right inguinal hernia 2024    Scrotal abscess     Secondary esophageal 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 (H) 2023    Tobacco abuse disorder 2020    Type 2 diabetes mellitus with hyperglycemia (H) 2023      Past Surgical History:   Procedure Laterality Date    BENCH LIVER  3/5/2024    Procedure: Bench liver;  Surgeon: Ryder Cortez MD;  Location: [facility]    CHOLECYSTECTOMY      COLONOSCOPY N/A 2024    Procedure: COLONOSCOPY, WITH POLYPECTOMY;  Surgeon: Jak Urbina MD;  Location: [facility]    CV RIGHT HEART CATH MEASUREMENTS RECORDED N/A 2024    Procedure: Right Heart Catheterization;  Surgeon: Alfred Tafoya MD;  Location:  [facility]    ENDOSCOPIC RETROGRADE CHOLANGIOPANCREATOGRAPHY, EXCHANGE TUBE/STENT N/A 2024     Procedure: Endoscopic Retrograde Cholangiopancreatography with biliary sphincterotomy, balloon dilation, pancreatic stent placement,biliary stent exchange;  Surgeon: Naldo Rodriguez MD;  Location: [facility]    ENTEROSCOPY SMALL BOWEL N/A 2024    Procedure: Enteroscopy small bowel;  Surgeon: Hugo Daigle MD;  Location: [facility]    ESOPHAGOSCOPY, GASTROSCOPY, DUODENOSCOPY (EGD), COMBINED N/A 2024    Procedure: Esophagoscopy, gastroscopy, duodenoscopy (EGD), combined;  Surgeon: Hugo Daigle MD;  Location: [facility]    HERNIORRHAPHY INGUINAL Right 2024    Procedure: Open Inguinal Hernia Repair;  Surgeon: Ryder Cortez MD;  Location: [facility]    IR LIVER BIOPSY PERCUTANEOUS  3/15/2024    IR RETROPERITONEAL ABSCESS DRAINAGE  2024    TONSILLECTOMY      TRANSPLANT LIVER RECIPIENT  DONOR N/A 3/5/2024    Procedure: Transplant liver recipient  donor, bile duct stent placement;  Surgeon: Ryder Cortez MD;  Location: [facility]    VASECTOMY        Allergies   Allergen Reactions    Other Food Allergy Anaphylaxis     Legumes (black beans, baked beans, chickpeas)    Pineapple Itching      Social History     Tobacco Use    Smoking status: Former     Types: Cigarettes     Passive exposure: Never    Smokeless tobacco: Current     Types: Chew     Last attempt to quit: 2004    Tobacco comments:     Chew daily 1/8 of a tin per day   Substance Use Topics    Alcohol use: Not Currently     Alcohol/week: 12.0 standard drinks of alcohol     Types: 12 Standard drinks or equivalent per week     Comment: Sober since 2023      Wt Readings from Last 1 Encounters:   10/22/24 93.2 kg (205 lb 7.5 oz)        Anesthesia Evaluation   Pt has had prior anesthetic. Type: General.    No history of anesthetic complications       ROS/MED HX  ENT/Pulmonary:  - neg pulmonary ROS     Neurologic:     (+)      migraines,                          Cardiovascular:     (+)  hypertension- -   -  - -   "                               Previous cardiac testing  (-) taking anticoagulants/antiplatelets   METS/Exercise Tolerance: >4 METS    Hematologic:       Musculoskeletal:       GI/Hepatic: Comment: Biliary stricture    (+)   esophageal disease, Varices,       hepatitis type Alcoholic, liver disease,       Renal/Genitourinary:     (+) renal disease,    Pt has history of transplant, date: 3/5/2024,        Endo:     (+)  type II DM,                    Psychiatric/Substance Use:     (+)   alcohol abuse      Infectious Disease:       Malignancy:       Other:            Physical Exam    Airway        Mallampati: II   TM distance: > 3 FB   Neck ROM: full   Mouth opening: > 3 cm    Respiratory Devices and Support         Dental       (+) Minor Abnormalities - some fillings, tiny chips      Cardiovascular   cardiovascular exam normal          Pulmonary   pulmonary exam normal                OUTSIDE LABS:  CBC:   Lab Results   Component Value Date    WBC 8.2 10/22/2024    WBC 6.3 10/21/2024    HGB 12.7 (L) 10/22/2024    HGB 12.9 (L) 10/21/2024    HCT 38.5 (L) 10/22/2024    HCT 38.3 (L) 10/21/2024     10/22/2024     10/21/2024     BMP:   Lab Results   Component Value Date     10/21/2024     10/07/2024    POTASSIUM 5.2 10/21/2024    POTASSIUM 4.9 10/07/2024    CHLORIDE 104 10/21/2024    CHLORIDE 101 10/07/2024    CO2 25 10/21/2024    CO2 26 10/07/2024    BUN 24.4 (H) 10/21/2024    BUN 25.3 (H) 10/07/2024    CR 1.63 (H) 10/21/2024    CR 1.61 (H) 10/07/2024     (H) 10/22/2024     (H) 10/21/2024     COAGS:   Lab Results   Component Value Date    PTT 33 03/31/2024    INR 1.12 08/13/2024    FIBR 310 03/31/2024     POC: No results found for: \"BGM\", \"HCG\", \"HCGS\"  HEPATIC:   Lab Results   Component Value Date    ALBUMIN 3.9 10/21/2024    PROTTOTAL 6.9 10/21/2024    ALT 57 10/21/2024    AST 55 (H) 10/21/2024    GGT 71 (H) 09/30/2024    ALKPHOS 273 (H) 10/21/2024    BILITOTAL 0.3 10/21/2024    " "JAQUELINE 27 03/09/2024     OTHER:   Lab Results   Component Value Date    PH 7.45 03/07/2024    LACT 0.8 03/30/2024    A1C 7.3 (H) 06/21/2024    MEG 9.9 10/21/2024    PHOS 3.7 10/21/2024    MAG 1.7 10/21/2024    LIPASE 10 (L) 08/13/2024    AMYLASE 34 08/13/2024    TSH 2.18 03/12/2022       Anesthesia Plan    ASA Status:  3       Anesthesia Type: General.     - Airway: ETT   Induction: Intravenous.   Maintenance: Balanced.        Consents    Anesthesia Plan(s) and associated risks, benefits, and realistic alternatives discussed. Questions answered and patient/representative(s) expressed understanding.     - Discussed: Risks, Benefits and Alternatives for BOTH SEDATION and the PROCEDURE were discussed     - Discussed with:  Patient            Postoperative Care    Pain management: IV analgesics, Oral pain medications, Multi-modal analgesia.   PONV prophylaxis: Ondansetron (or other 5HT-3)     Comments:               Cassie Flowers MD    I have reviewed the pertinent notes and labs in the chart from the past 30 days and (re)examined the patient.  Any updates or changes from those notes are reflected in this note.               # Hypertension: Home medication list includes antihypertensive(s)        # DMII: A1C = N/A within past 6 months    # Overweight: Estimated body mass index is 29.48 kg/m  as calculated from the following:    Height as of this encounter: 1.778 m (5' 10\").    Weight as of this encounter: 93.2 kg (205 lb 7.5 oz).       # Financial/Environmental Concerns:          "

## 2024-10-22 NOTE — ANESTHESIA PROCEDURE NOTES
Airway       Patient location during procedure: OR  Staff -        Performed By: with fellow and CRNA       Procedure performed by resident/fellow/CRNA in presence of a teaching physician.    Consent for Airway        Urgency: elective  Indications and Patient Condition       Indications for airway management: doreen-procedural       Induction type:intravenous       Mask difficulty assessment: 1 - vent by mask    Final Airway Details       Final airway type: endotracheal airway       Successful airway: ETT - single  Endotracheal Airway Details        ETT size (mm): 7.5       Cuffed: yes       Successful intubation technique: direct laryngoscopy       DL Blade Type: Saenz 2       Grade View of Cords: 1       Adjucts: stylet       Position: Right       Measured from: gums/teeth       Secured at (cm): 23       Bite block used: None    Post intubation assessment        Placement verified by: capnometry, equal breath sounds and chest rise        Number of attempts at approach: 1       Number of other approaches attempted: 0       Secured with: tape       Ease of procedure: easy       Dentition: Intact       Dental guard used and removed. Dental Guard Type: Standard White.

## 2024-10-22 NOTE — BRIEF OP NOTE
[facility]    Brief Operative Note    Pre-operative diagnosis: Biliary stricture (H) [K83.1]  Post-operative diagnosis Same as pre-operative diagnosis    Procedure: ENDOSCOPIC RETROGRADE CHOLANGIOPANCREATOGRAPHY, WITH bile duct stent stent exchange and balloon dilation, N/A - Mouth    Surgeon: Surgeons and Role:     * Naldo Rodriguez MD - Primary  Anesthesia: General   Estimated Blood Loss: None    Drains: None  Specimens: * No specimens in log *  Findings:   None.  Complications: None.  Implants:   Implant Name Type Inv. Item Serial No.  Lot No. LRB No. Used Action   STENT COTTON-MANDUJANO ([place]) NORMA SOF-FLEX [id number] - [id number] Stent STENT COTTON-MANDUJANO ([place]) NORMA SOF-FLEX [id number]  [employer] [id number] N/A 1 Explanted   STENT GEENEN PANCREA [id number] GPSO-SF-5-3 - [id number] Stent STENT GEENEN PANCREA [id number] GPSO-SF-5-3  [employer] [id number] N/A 1 Explanted   STENT RODRIGUEZ PANCREA FLEX [id number] W/O FLANGE SGL PIGTAIL - [id number] Stent STENT RODRIGUEZ PANCREA FLEX [id number] W/O FLANGE SGL PIGTAIL  [employer]IMENEZ [id number] N/A 1 Implanted     Previous stent in position but occluded, removed. Minimal stenosis at anastamosis, balloon dilated to 8mm, single 4F 11cm fallou stent placed    REC  Home  Xray in 2-3 weeks to ensure stent passage  FOllow LFTs closely in transplant clinic, as risk of late stenosis.

## 2024-10-24 ENCOUNTER — CARE COORDINATION (OUTPATIENT)
Dept: GASTROENTEROLOGY | Facility: CLINIC | Age: 54
End: 2024-10-24

## 2024-10-24 DIAGNOSIS — K83.1 BILIARY STRICTURE (H): Primary | ICD-10-CM

## 2024-10-24 NOTE — PROGRESS NOTES
Post ERCP 10-22-24   Xray in 2-3 weeks to ensure stent passage    Imaging ordered, Mychart sent  ML

## 2024-10-28 ENCOUNTER — ANCILLARY PROCEDURE (OUTPATIENT)
Dept: GENERAL RADIOLOGY | Facility: CLINIC | Age: 54
End: 2024-10-28
Attending: INTERNAL MEDICINE
Payer: COMMERCIAL

## 2024-10-28 ENCOUNTER — LAB (OUTPATIENT)
Dept: LAB | Facility: CLINIC | Age: 54
End: 2024-10-28
Payer: COMMERCIAL

## 2024-10-28 DIAGNOSIS — M25.561 ACUTE PAIN OF RIGHT KNEE: ICD-10-CM

## 2024-10-28 DIAGNOSIS — Z94.4 LIVER REPLACED BY TRANSPLANT (H): ICD-10-CM

## 2024-10-28 DIAGNOSIS — K86.9 DIABETES MELLITUS ASSOCIATED WITH PANCREATIC DISEASE (H): ICD-10-CM

## 2024-10-28 DIAGNOSIS — E11.69 DIABETES MELLITUS ASSOCIATED WITH PANCREATIC DISEASE (H): ICD-10-CM

## 2024-10-28 DIAGNOSIS — Z94.4 S/P LIVER TRANSPLANT (H): ICD-10-CM

## 2024-10-28 DIAGNOSIS — D70.9 NEUTROPENIA (H): ICD-10-CM

## 2024-10-28 DIAGNOSIS — N18.31 CKD STAGE 3A, GFR 45-59 ML/MIN (H): Primary | ICD-10-CM

## 2024-10-28 DIAGNOSIS — Z94.4 LIVER REPLACED BY TRANSPLANT (H): Primary | Chronic | ICD-10-CM

## 2024-10-28 LAB
ALBUMIN SERPL BCG-MCNC: 4 G/DL (ref 3.5–5.2)
ALP SERPL-CCNC: 238 U/L (ref 40–150)
ALT SERPL W P-5'-P-CCNC: 32 U/L (ref 0–70)
ANION GAP SERPL CALCULATED.3IONS-SCNC: 14 MMOL/L (ref 7–15)
AST SERPL W P-5'-P-CCNC: 35 U/L (ref 0–45)
BASOPHILS # BLD AUTO: 0 10E3/UL (ref 0–0.2)
BASOPHILS NFR BLD AUTO: 1 %
BILIRUB DIRECT SERPL-MCNC: <0.2 MG/DL (ref 0–0.3)
BILIRUB SERPL-MCNC: 0.3 MG/DL
BUN SERPL-MCNC: 47.1 MG/DL (ref 6–20)
CALCIUM SERPL-MCNC: 9.6 MG/DL (ref 8.8–10.4)
CHLORIDE SERPL-SCNC: 98 MMOL/L (ref 98–107)
CREAT SERPL-MCNC: 2.09 MG/DL (ref 0.67–1.17)
CYCLOSPORINE BLD LC/MS/MS-MCNC: 42 UG/L (ref 50–400)
EGFRCR SERPLBLD CKD-EPI 2021: 37 ML/MIN/1.73M2
EOSINOPHIL # BLD AUTO: 0.7 10E3/UL (ref 0–0.7)
EOSINOPHIL NFR BLD AUTO: 12 %
ERYTHROCYTE [DISTWIDTH] IN BLOOD BY AUTOMATED COUNT: 12.5 % (ref 10–15)
EST. AVERAGE GLUCOSE BLD GHB EST-MCNC: 214 MG/DL
GLUCOSE SERPL-MCNC: 289 MG/DL (ref 70–99)
HBA1C MFR BLD: 9.1 % (ref 0–5.6)
HCO3 SERPL-SCNC: 24 MMOL/L (ref 22–29)
HCT VFR BLD AUTO: 38.4 % (ref 40–53)
HGB BLD-MCNC: 12.9 G/DL (ref 13.3–17.7)
IMM GRANULOCYTES # BLD: 0 10E3/UL
IMM GRANULOCYTES NFR BLD: 0 %
LYMPHOCYTES # BLD AUTO: 1.2 10E3/UL (ref 0.8–5.3)
LYMPHOCYTES NFR BLD AUTO: 21 %
MAGNESIUM SERPL-MCNC: 1.9 MG/DL (ref 1.7–2.3)
MCH RBC QN AUTO: 29.5 PG (ref 26.5–33)
MCHC RBC AUTO-ENTMCNC: 33.6 G/DL (ref 31.5–36.5)
MCV RBC AUTO: 88 FL (ref 78–100)
MONOCYTES # BLD AUTO: 0.5 10E3/UL (ref 0–1.3)
MONOCYTES NFR BLD AUTO: 9 %
NEUTROPHILS # BLD AUTO: 3.3 10E3/UL (ref 1.6–8.3)
NEUTROPHILS NFR BLD AUTO: 58 %
PHOSPHATE SERPL-MCNC: 4.9 MG/DL (ref 2.5–4.5)
PLATELET # BLD AUTO: 177 10E3/UL (ref 150–450)
POTASSIUM SERPL-SCNC: 4.8 MMOL/L (ref 3.4–5.3)
PROT SERPL-MCNC: 7 G/DL (ref 6.4–8.3)
RBC # BLD AUTO: 4.38 10E6/UL (ref 4.4–5.9)
SODIUM SERPL-SCNC: 136 MMOL/L (ref 135–145)
TME LAST DOSE: ABNORMAL H
TME LAST DOSE: ABNORMAL H
WBC # BLD AUTO: 5.7 10E3/UL (ref 4–11)
WBC # BLD AUTO: 5.7 10E3/UL (ref 4–11)

## 2024-10-28 PROCEDURE — 80158 DRUG ASSAY CYCLOSPORINE: CPT

## 2024-10-28 PROCEDURE — 73562 X-RAY EXAM OF KNEE 3: CPT | Mod: TC | Performed by: RADIOLOGY

## 2024-10-28 PROCEDURE — 83036 HEMOGLOBIN GLYCOSYLATED A1C: CPT

## 2024-10-28 PROCEDURE — 85025 COMPLETE CBC W/AUTO DIFF WBC: CPT

## 2024-10-28 PROCEDURE — 80053 COMPREHEN METABOLIC PANEL: CPT

## 2024-10-28 PROCEDURE — 82248 BILIRUBIN DIRECT: CPT

## 2024-10-28 PROCEDURE — 36415 COLL VENOUS BLD VENIPUNCTURE: CPT

## 2024-10-28 PROCEDURE — 84100 ASSAY OF PHOSPHORUS: CPT

## 2024-10-28 PROCEDURE — 83735 ASSAY OF MAGNESIUM: CPT

## 2024-10-28 RX ORDER — CYCLOSPORINE 25 MG/1
50 CAPSULE, LIQUID FILLED ORAL EVERY 12 HOURS
Qty: 120 CAPSULE | Refills: 11 | Status: SHIPPED | OUTPATIENT
Start: 2024-10-28 | End: 2024-11-01

## 2024-10-28 RX ORDER — CYCLOSPORINE 100 MG/1
100 CAPSULE, LIQUID FILLED ORAL EVERY 12 HOURS
Qty: 60 CAPSULE | Refills: 11 | Status: SHIPPED | OUTPATIENT
Start: 2024-10-28 | End: 2024-11-01

## 2024-10-28 NOTE — TELEPHONE ENCOUNTER
Spoke to pt who states that he has had 2 episodes of watery stools in the past 2 days. So 2 out the 5 stools he had were watery. Rosio is almost positive she put the correct amount of CSA in pt's med box. Please advise.

## 2024-10-28 NOTE — TELEPHONE ENCOUNTER
ISSUE:   Cyclosporine level 42 on 10/28, goal 100-150, dose 100 mg BID.It's very strange that his level is so low.  Did he miss a dose?  Is he having diarrhea?    PLAN:   Call Patient and confirm this was an accurate 12-hour trough.   Verify cyclosporine dose.  Confirm no new medications or or missed doses.   Confirm no new illness / infection / diarrhea.   If accurate trough and accurate dose, increase Cyclosporine dose to 150 mg BID     Repeat CSA level on Thursday.   Lore Mckeon RN      OUTCOME:   Spoke with Rosio, they confirm accurate trough level and current dose 100 mg BID.   Rosio confirmed dose change to 150 mg BID.  Rosio agreed to repeat CSA on Thursday.   Orders sent to preferred pharmacy for dose change and lab for repeat labs.   Rosio voiced understanding of plan.    Pt states that diarrhea has subsided.    Valentina Enamorado LPN

## 2024-10-30 ENCOUNTER — VIRTUAL VISIT (OUTPATIENT)
Dept: FAMILY MEDICINE | Facility: CLINIC | Age: 54
End: 2024-10-30
Payer: COMMERCIAL

## 2024-10-30 DIAGNOSIS — M25.561 CHRONIC PAIN OF RIGHT KNEE: Primary | ICD-10-CM

## 2024-10-30 DIAGNOSIS — Z94.4 LIVER REPLACED BY TRANSPLANT (H): Chronic | ICD-10-CM

## 2024-10-30 DIAGNOSIS — G89.29 CHRONIC PAIN OF RIGHT KNEE: Primary | ICD-10-CM

## 2024-10-30 PROCEDURE — 99214 OFFICE O/P EST MOD 30 MIN: CPT | Mod: 95 | Performed by: INTERNAL MEDICINE

## 2024-10-30 PROCEDURE — G2211 COMPLEX E/M VISIT ADD ON: HCPCS | Mod: 95 | Performed by: INTERNAL MEDICINE

## 2024-10-30 NOTE — PROGRESS NOTES
"  If patient has telephone visit, have they been educated on video visit as preferred visit method and offered to change to video visit? NOT APPLICABLE        Instructions Relayed to Patient by Virtual Roomer:     Patient is active on [employer]:   Relayed following to patient: \"It looks like you are active on [employer], are you able to join the visit this way? If not, do you need us to send you a link now or would you like your provider to send a link via text or email when they are ready to initiate the visit?\"      Patient Confirmed they will join visit via: [employer]  Reminded patient to ensure they were logged on to virtual visit by arrival time listed.   Asked if patient has flexibility to initiate visit sooner than arrival time: patient stated yes, documented in appointment notes availability to initiate visit earlier than arrival time     If pediatric virtual visit, ensured pediatric patient along with parent/guardian will be present for video visit.     Patient offered the website [email or web address] and/or phone number to [employer] Help line: [phone]      Answers submitted by the patient for this visit:  General Questionnaire (Submitted on 10/29/2024)  Chief Complaint: Chronic problems general questions HPI Form  How many days per week do you miss taking your medication?: 0  General Concern (Submitted on 10/29/2024)  Chief Complaint: Chronic problems general questions HPI Form  What is the reason for your visit today?: right knee pain  When did your symptoms begin?: 3-4 weeks ago  What are your symptoms?: pain in the right knee  How would you describe these symptoms?: Moderate  Are your symptoms:: Staying the same  Have you had these symptoms before?: Yes  Have you tried or received treatment for these symptoms before?: No  Is there anything that makes you feel worse?: going up and down stairs, kneeling  Is there anything that makes you feel better?: sitting  Questionnaire about: Chronic " "problems general questions HPI Form (Submitted on 10/29/2024)  Chief Complaint: Chronic problems general questions HPI Form  Mary is a 53 year old who is being evaluated via a billable video visit.    How would you like to obtain your AVS? MyChart  If the video visit is dropped, the invitation should be resent by: Text to cell phone: [phone]  Will anyone else be joining your video visit? No      Assessment & Plan     1.  Right knee pain-persistent particularly with weightbearing and ambulation.  Plain x-ray negative.  Will proceed with MRI of the knee.  Suspect internal derangement.  Discussed the option of getting an MRI versus trying a intra-articular cortisone injection.  Patient elected to pursue with MRI first.  2.  Status post liver transplant          BMI  Estimated body mass index is 29.48 kg/m  as calculated from the following:    Height as of 10/22/24: 1.778 m (5' 10\").    Weight as of 10/22/24: 93.2 kg (205 lb 7.5 oz).         Subjective   Mary is a 53 year old, presenting for the following health issues:  No chief complaint on file.        10/30/2024     8:26 AM   Additional Questions   Roomed by Cj   Accompanied by self     History of Present Illness       Reason for visit:  Right knee pain  Symptom onset:  3-4 weeks ago  Symptoms include:  Pain in the right knee  Symptom intensity:  Moderate  Symptom progression:  Staying the same  Had these symptoms before:  Yes  Has tried/received treatment for these symptoms:  No  What makes it worse:  Going up and down stairs, kneeling  What makes it better:  Sitting   He is taking medications regularly.       Patient complaining of right knee pain particularly with weightbearing and ambulation.  Having difficulty using staircase.  At rest he does not seem to have pain.  He has not noticed any swelling.  X-ray of the right knee is negative.        Review of Systems  Constitutional, HEENT, cardiovascular, pulmonary, GI, , musculoskeletal, neuro, skin, " endocrine and psych systems are negative, except as otherwise noted.      Objective           Vitals:  No vitals were obtained today due to virtual visit.    Physical Exam   GENERAL: alert and no distress  EYES: Eyes grossly normal to inspection.  No discharge or erythema, or obvious scleral/conjunctival abnormalities.  RESP: No audible wheeze, cough, or visible cyanosis.    SKIN: Visible skin clear. No significant rash, abnormal pigmentation or lesions.  NEURO: Cranial nerves grossly intact.  Mentation and speech appropriate for age.  PSYCH: Appropriate affect, tone, and pace of words          Video-Visit Details    Type of service:  Video Visit   Originating Location (pt. Location): None    Distant Location (provider location): Yes telemetry mom right there  Platform used for Video Visit: [employer] his time is just abnormal  Signed Electronically by: Jimmie Hoyt MD

## 2024-10-31 ENCOUNTER — LAB (OUTPATIENT)
Dept: LAB | Facility: CLINIC | Age: 54
End: 2024-10-31
Payer: COMMERCIAL

## 2024-10-31 DIAGNOSIS — Z94.4 LIVER REPLACED BY TRANSPLANT (H): Chronic | ICD-10-CM

## 2024-10-31 LAB
CYCLOSPORINE BLD LC/MS/MS-MCNC: 91 UG/L (ref 50–400)
TME LAST DOSE: NORMAL H
TME LAST DOSE: NORMAL H

## 2024-10-31 PROCEDURE — 80158 DRUG ASSAY CYCLOSPORINE: CPT

## 2024-10-31 PROCEDURE — 36415 COLL VENOUS BLD VENIPUNCTURE: CPT

## 2024-11-01 ENCOUNTER — VIRTUAL VISIT (OUTPATIENT)
Dept: EDUCATION SERVICES | Facility: CLINIC | Age: 54
End: 2024-11-01
Payer: COMMERCIAL

## 2024-11-01 DIAGNOSIS — E11.9 TYPE 2 DIABETES MELLITUS WITHOUT COMPLICATION, WITH LONG-TERM CURRENT USE OF INSULIN (H): Primary | Chronic | ICD-10-CM

## 2024-11-01 DIAGNOSIS — Z94.4 LIVER REPLACED BY TRANSPLANT (H): Chronic | ICD-10-CM

## 2024-11-01 DIAGNOSIS — Z94.4 S/P LIVER TRANSPLANT (H): ICD-10-CM

## 2024-11-01 DIAGNOSIS — Z79.4 TYPE 2 DIABETES MELLITUS WITHOUT COMPLICATION, WITH LONG-TERM CURRENT USE OF INSULIN (H): Primary | Chronic | ICD-10-CM

## 2024-11-01 PROCEDURE — G0108 DIAB MANAGE TRN  PER INDIV: HCPCS | Mod: 95 | Performed by: NUTRITIONIST

## 2024-11-01 RX ORDER — CYCLOSPORINE 100 MG/1
100 CAPSULE, LIQUID FILLED ORAL EVERY 12 HOURS
Qty: 60 CAPSULE | Refills: 11 | Status: SHIPPED | OUTPATIENT
Start: 2024-11-01

## 2024-11-01 RX ORDER — CYCLOSPORINE 25 MG/1
75 CAPSULE, LIQUID FILLED ORAL EVERY 12 HOURS
Qty: 540 CAPSULE | Refills: 3 | Status: SHIPPED | OUTPATIENT
Start: 2024-11-01

## 2024-11-01 NOTE — TELEPHONE ENCOUNTER
ISSUE:   Cyclosporine level  91 on 10/31, goal 100-150, dose 150 mg BID.    PLAN:   Call Patient and confirm this was an accurate 12-hour trough.   Verify Cyclosporine  dose ..   Confirm no new medications or or missed doses.   Confirm no new illness / infection / diarrhea.   If accurate trough and accurate dose, increase Cyclosporine dose to 175 mg BID   Repeat labs in 2 weeks to assure that CSA level comes up and other labs didn't change w/ lower levels.  If good at that time can decrease to monthly.  Lore Mckeon RN       OUTCOME:   Spoke with darlene, they confirm accurate trough level and current dose 150 mg BID.   Rosio confirmed dose change to 175 mg BID.  Rosio agreed to repeat labs in 2 weeks.   Orders sent to preferred pharmacy for dose change and lab for repeat labs.   Isatu voiced understanding of plan.     Valentina Enamorado LPN

## 2024-11-01 NOTE — NURSING NOTE
Current patient location: [address] [place] [address]  [place] [address]    Is the patient currently in the state of [place]? YES    Visit mode:VIDEO    If the visit is dropped, the patient can be reconnected by: VIDEO VISIT: Text to cell phone:   Telephone Information:   Mobile [phone]   Mobile [phone]       Will anyone else be joining the visit? NO  (If patient encounters technical issues they should call [phone]    Are changes needed to the allergy or medication list? Pt stated no med changes    Are refills needed on medications prescribed by this physician? NO    Rooming Documentation:  Not applicable    Reason for visit: RECHECK (Diabetes follow-up )    Janna CHRISTY

## 2024-11-01 NOTE — PROGRESS NOTES
Virtual Visit Details    Type of service:  Video Visit   Joined the call at 11/1/2024, 2:02:33 pm.  Left the call at 11/1/2024, 2:23:14 pm.  You were on the call for 20 minutes 41 seconds   Originating Location (pt. Location): Home    Distant Location (provider location):  Off-site  Platform used for Video Visit: [employer]    Diabetes Self-Management Education & Support    Mary Lopez presents today for education related to Type 2 diabetes    Patient is being treated with:  insulin  He is accompanied by spouse    Year of diagnosis: 2023  Referring provider:  Lai  Patient is followed by Suzanne THOMPSON, now followed by Geovanna THOMPSON  Living Situation: with spouse   Employment: n/a    PATIENT CONCERNS RELATED TO DIABETES SELF MANAGEMENT:       ASSESSMENT:    Taking Medication:     Current Diabetes Management per Patient:  Taking diabetes medications? yes:     Diabetes Medication(s)       Diabetic Other       Glucagon (GVOKE HYPOPEN) 1 MG/0.2ML pen Inject the contents of 1 device under the skin into lower abdomen, outer thigh, or outer upper arm as needed for hypoglycemia. If no response after 15 minutes, additional 1 mg dose from a new device may be injected while waiting for emergency assistance.       Insulin       insulin degludec (TRESIBA FLEXTOUCH) 100 UNIT/ML pen Inject 16 units subcutaneous each am. Please do NOT fill today ( 9/4/2024 ).     Insulin Lispro-aabc, 1 U Dial, (LYUMJEV KWIKPEN) 100 UNIT/ML SOPN Inject 6 Units Subcutaneous 3 times daily (with meals) 6 units with meals, plus correction. Pt may use up to 30 units daily. This REPLACES Humalog/Novolog insulin.       Sodium-Glucose Co-Transporter 2 (SGLT2) Inhibitors       empagliflozin (JARDIANCE) 25 MG TABS tablet Take 1 tablet (25 mg) by mouth daily            Monitoring              Patient's most recent   Lab Results   Component Value Date    A1C 8.1 10/31/2023        Healthy Eating:   encouraged consistent carb diet    Problem Solving:       Patient is at risk of hypoglycemia?: YES  Hospitalizations for hyper or hypoglycemia: No      EDUCATION and INSTRUCTION PROVIDED AT THIS VISIT:      Follow up with Mary and Adina today. Mary has been eating more sugar lately. Discussed mounjaro but patient does not want to start that right now. He is willing to increase insulin. Will be starting Cequr this month.     PLAN:  Increase tresiba to 20 units   Increase lyumjev to 8 units with meals  Remember to take correction. Can be given every 3-4 hours    Patient-stated goal written and given to Mary Lopez.  Verbalized and demonstrated understanding of instructions.       FOLLOW-UP:      Joined the call at 8/5/2024, 7:43:11 am.  Left the call at 8/5/2024, 8:19:39 am.  You were on the call for 36 minutes 28 seconds     Any diabetes medication dose changes were made via the CDE Protocol and Collaborative Practice Agreement with Zaid and  Vishal.  A copy of this encounter was provided to patient's referring provider.

## 2024-11-06 ENCOUNTER — MYC MEDICAL ADVICE (OUTPATIENT)
Dept: FAMILY MEDICINE | Facility: CLINIC | Age: 54
End: 2024-11-06
Payer: COMMERCIAL

## 2024-11-07 ENCOUNTER — TELEPHONE (OUTPATIENT)
Dept: TRANSPLANT | Facility: CLINIC | Age: 54
End: 2024-11-07
Payer: COMMERCIAL

## 2024-11-07 DIAGNOSIS — R52 PAIN: Primary | ICD-10-CM

## 2024-11-07 NOTE — TELEPHONE ENCOUNTER
Message from Adina:    Vicente Torrez!  Mary developed some pain in the hernia area and I'm wondering of he overdid it and may have popped something. It's been going on for about a week or so.     Would it be possible to have Dr. SANTIAGO or Dr. Muniz order an ultrasound to have it checked out?     Thank you!  Adina        Message to Celsa Chirinos asking if I can get an urgent appt w/ Ying to see Mary tomorrow morning.

## 2024-11-08 ENCOUNTER — OFFICE VISIT (OUTPATIENT)
Dept: TRANSPLANT | Facility: CLINIC | Age: 54
End: 2024-11-08
Attending: NURSE PRACTITIONER
Payer: COMMERCIAL

## 2024-11-08 VITALS
WEIGHT: 214.4 LBS | DIASTOLIC BLOOD PRESSURE: 69 MMHG | OXYGEN SATURATION: 99 % | BODY MASS INDEX: 30.76 KG/M2 | HEART RATE: 88 BPM | SYSTOLIC BLOOD PRESSURE: 105 MMHG

## 2024-11-08 DIAGNOSIS — R52 PAIN: ICD-10-CM

## 2024-11-08 PROCEDURE — 99213 OFFICE O/P EST LOW 20 MIN: CPT | Performed by: NURSE PRACTITIONER

## 2024-11-08 PROCEDURE — 99024 POSTOP FOLLOW-UP VISIT: CPT | Performed by: NURSE PRACTITIONER

## 2024-11-08 RX ORDER — METHOCARBAMOL 750 MG/1
750 TABLET, FILM COATED ORAL 3 TIMES DAILY PRN
Qty: 15 TABLET | Refills: 0 | Status: SHIPPED | OUTPATIENT
Start: 2024-11-08 | End: 2024-11-14

## 2024-11-08 NOTE — PROGRESS NOTES
Transplant Surgery -OUTPATIENT PROGRESS NOTE    Date of Visit: 11/08/2024    Transplants:  3/5/2024 (Liver); Postoperative day:  248  ASSESMENT AND PLAN:  R Inguinal pain   +TTP on exam with no obvious bulge. US ordered to R/O abscess and recurrent hernia. Surgeon updated.   Rec heat/ice therapy. Robaxin PRN.     Date: November 8, 2024    Transplant:  [x]                             Liver [x]                              Kidney []                             Pancreas []                              Other:             Chief Complaint:RECHECK (Hernia repair)    History of Present Illness:  Hx of S/P liver tx 3/5/2024 and open inguinal hernia repair 9/18/2024.   4 days ago developed  pain in R inguinal area after doing activities at the end of the day. Every evening for the last 4 days. Admits he's been very active around the house and yard.   Improves with sitting but triggered by leg movement when sitting.   Feels similar to pain he had immediate post op from hernia repair. Sharp and consistent pain until he sits down.   No N/V. NO fever.   No bulge noted.         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)    CKD stage 3a, GFR 45-59 ml/min (H)    History of biliary duct stent placement    Inguinal hernia     SOCIAL /FAMILY HISTORY: [x]                  No recent change    Past Medical History:   Diagnosis Date    ANGEL (acute kidney injury) (H)     Alcoholic cirrhosis of liver without ascites (H) 07/13/2023    Alcoholic hepatitis with ascites (H) 10/03/2023    Alcoholic hepatitis without ascites (H) 07/13/2023    CKD stage 3a, GFR 45-59 ml/min (H) 08/08/2024    Closed  fracture of one rib of left side 09/14/2023    Concussion without loss of consciousness 03/11/2020    Decompensated hepatic cirrhosis (H) 09/15/2023    Diabetes mellitus, type 2 (H) 11/22/2023    Essential hypertension 03/11/2020    Ganglion cyst of wrist, right 04/18/2024    History of biliary duct stent placement 09/05/2024    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)     Liver replaced by transplant (H) 03/05/2024    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    ACR PRAJAPATI 6-7/9, treated with steroids    Mild hyperlipidemia 12/07/2021    Persistent insomnia 07/13/2023    Portal hypertension (H) 07/13/2023    Right inguinal hernia 08/08/2024    Scrotal abscess     Secondary esophageal 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 (H) 07/13/2023    Tobacco abuse disorder 03/11/2020    Type 2 diabetes mellitus with hyperglycemia (H) 12/22/2023     Past Surgical History:   Procedure Laterality Date    BENCH LIVER  3/5/2024    Procedure: Bench liver;  Surgeon: Ryder Cortez MD;  Location: [facility]    CHOLECYSTECTOMY      COLONOSCOPY N/A 1/2/2024    Procedure: COLONOSCOPY, WITH POLYPECTOMY;  Surgeon: Jak Urbina MD;  Location: [facility]    CV RIGHT HEART CATH MEASUREMENTS RECORDED N/A 1/30/2024    Procedure: Right Heart Catheterization;  Surgeon: Alfred Tafoya MD;  Location:  HE[facility] CARDIAC CATH LAB    ENDOSCOPIC RETROGRADE CHOLANGIOPANCREATOGRAM N/A 10/22/2024    Procedure: ENDOSCOPIC RETROGRADE CHOLANGIOPANCREATOGRAPHY, WITH bile duct stent stent exchange and balloon dilation;  Surgeon: Naldo Rodriguez MD;  Location: [facility]    ENDOSCOPIC RETROGRADE CHOLANGIOPANCREATOGRAPHY, EXCHANGE TUBE/STENT N/A 8/13/2024    Procedure: Endoscopic Retrograde Cholangiopancreatography with biliary sphincterotomy, balloon dilation, pancreatic stent placement,biliary stent exchange;  Surgeon: Naldo Rodriguez MD;  Location: [facility]    ENTEROSCOPY SMALL BOWEL N/A 7/12/2024    Procedure: Enteroscopy small bowel;  Surgeon: Flavio  Hugo Lerma MD;  Location: [facility]    ESOPHAGOSCOPY, GASTROSCOPY, DUODENOSCOPY (EGD), COMBINED N/A 2024    Procedure: Esophagoscopy, gastroscopy, duodenoscopy (EGD), combined;  Surgeon: Hugo Daigle MD;  Location: [facility]    HERNIORRHAPHY INGUINAL Right 2024    Procedure: Open Inguinal Hernia Repair;  Surgeon: Ryder Cortez MD;  Location: [facility]    IR LIVER BIOPSY PERCUTANEOUS  3/15/2024    IR RETROPERITONEAL ABSCESS DRAINAGE  2024    TONSILLECTOMY      TRANSPLANT LIVER RECIPIENT  DONOR N/A 3/5/2024    Procedure: Transplant liver recipient  donor, bile duct stent placement;  Surgeon: Ryder Cortez MD;  Location: [facility]    VASECTOMY       Social History     Socioeconomic History    Marital status:      Spouse name: Not on file    Number of children: Not on file    Years of education: Not on file    Highest education level: Not on file   Occupational History    Occupation: Not working now   Tobacco Use    Smoking status: Former     Types: Cigarettes     Passive exposure: Never    Smokeless tobacco: Current     Types: Chew     Last attempt to quit: 2004    Tobacco comments:     Chew daily 1/8 of a tin per day   Vaping Use    Vaping status: Never Used   Substance and Sexual Activity    Alcohol use: Not Currently     Alcohol/week: 12.0 standard drinks of alcohol     Types: 12 Standard drinks or equivalent per week     Comment: Sober since 2023    Drug use: Not Currently    Sexual activity: Yes     Partners: Female     Birth control/protection: Male Surgical   Other Topics Concern    Parent/sibling w/ CABG, MI or angioplasty before 65F 55M? No   Social History Narrative    Not on file     Social Drivers of Health     Financial Resource Strain: Low Risk  (2023)    Financial Resource Strain     Within the past 12 months, have you or your family members you live with been unable to get utilities (heat, electricity) when it was really needed?: No    Food Insecurity: No Food Insecurity (6/22/2024)    Received from [facility]    Hunger Vital Sign     Worried About Running Out of Food in the Last Year: Never true     Ran Out of Food in the Last Year: Never true   Transportation Needs: No Transportation Needs (6/22/2024)    Received from [facility]    PRAPARE - Transportation     Lack of Transportation (Medical): No     Lack of Transportation (Non-Medical): No   Physical Activity: Not on file   Stress: Not on file   Social Connections: Not on file   Interpersonal Safety: Low Risk  (10/22/2024)    Interpersonal Safety     Do you feel physically and emotionally safe where you currently live?: Yes     Within the past 12 months, have you been hit, slapped, kicked or otherwise physically hurt by someone?: No     Within the past 12 months, have you been humiliated or emotionally abused in other ways by your partner or ex-partner?: No   Housing Stability: Low Risk  (6/22/2024)    Received from [facility]    Housing Stability Vital Sign     Unable to Pay for Housing in the Last Year: No     Number of Times Moved in the Last Year: 0     Homeless in the Last Year: No     Prescription Medications as of 11/8/2024         Rx Number Disp Refills Start End Last Dispensed Date Next Fill Date Owning Pharmacy    ACE/ARB/ARNI NOT PRESCRIBED (INTENTIONAL)  -- --  --       Sig: Please choose reason not prescribed from choices below.    Class: Historical    blood glucose (NO BRAND SPECIFIED) test strip  100 strip 6 10/24/2023 --   "[facility]" #[id number] - [place], [place] - [address]    Sig: Use to test blood sugar two times daily or as directed. To accompany: Blood Glucose Monitor Brands: per insurance.    Class: E-Prescribe    blood glucose monitoring (NO BRAND SPECIFIED) meter device kit  1 kit 0 10/24/2023 --   "[facility]" #[id number] - [place], [place] - [address]    Sig: Use to test  blood sugar twice times daily or as directed. Preferred blood glucose meter OR supplies to accompany: Blood Glucose Monitor Brands: per insurance.    Class: E-Prescribe    Continuous Glucose Sensor (DEXCOM G7 SENSOR) MISC  3 each 5 8/27/2024 --   [facility] #[id number] - [place], [place] - [address]    Sig: Change every 10 days.    Class: E-Prescribe    cycloSPORINE modified (GENERIC EQUIVALENT) 100 MG capsule  60 capsule 11 11/1/2024 --   [facility] #[id number] - [place], [place] [id number]    Sig: Take 1 capsule (100 mg) by mouth every 12 hours. Total dose 150mg BID    Class: E-Prescribe    Route: Oral    cycloSPORINE modified (GENERIC EQUIVALENT) 25 MG capsule  540 capsule 3 11/1/2024 --   [facility] #[id number] - [place], [place] [id number]    Sig: Take 3 capsules (75 mg) by mouth every 12 hours. Total dose 175mg BID    Class: E-Prescribe    Route: Oral    empagliflozin (JARDIANCE) 25 MG TABS tablet  90 tablet 1 6/6/2024 --   [facility] #[id number] - [place], [place] [id number]    Sig: Take 1 tablet (25 mg) by mouth daily    Class: E-Prescribe    Route: Oral    gabapentin (NEURONTIN) 300 MG capsule  60 capsule 1 9/23/2024 --   [facility] #[id number] - [place], [place] [id number]    Sig: Take 1 capsule (300 mg) by mouth 2 times daily.    Class: E-Prescribe    Route: Oral    Glucagon (GVOKE HYPOPEN) 1 MG/0.2ML pen  0.4 mL 1 3/21/2024 --   [facility] - [place], [place] - [address]     Sig: Inject the contents of 1 device under the skin into lower abdomen, outer thigh, or outer upper arm as needed for hypoglycemia. If no response after 15 minutes, additional 1 mg dose from a new device may be injected while waiting for emergency assistance.    Class: E-Prescribe    Notes to Pharmacy: Replaces BAQSIMI per insurance  formulary    Injection Device for insulin (CEQUR SIMPLICITY 2U) KHUSHBOO  10 each 5 2024 --   [id number] - [place], [place] - [address]    Si each every 3 days    Class: E-Prescribe    Route: Does not apply    insulin degludec (TRESIBA FLEXTOUCH) 100 UNIT/ML pen  15 mL 5 2024 --   [id number] - [place], [place] - [address]    Sig: Inject 16 units subcutaneous each am. Please do NOT fill today ( 2024 ).    Class: E-Prescribe    Insulin Lispro-aabc, 1 U Dial, (LYUMJEV KWIKPEN) 100 UNIT/ML SOPN  30 mL 2 2024 --   [facility] #[id number] - [place], [place] - [address]    Sig: Inject 6 Units Subcutaneous 3 times daily (with meals) 6 units with meals, plus correction. Pt may use up to 30 units daily. This REPLACES Humalog/Novolog insulin.    Class: E-Prescribe    Route: Subcutaneous    insulin pen needle (29G X 12.7MM) 29G X 12.7MM miscellaneous  90 each 1 2024 --   [id number] - [place], [place] - [address]    Sig: Use 1 pen needle every three days or as directed.    Class: E-Prescribe    insulin pen needle (32G X 4 MM) 32G X 4 MM miscellaneous  200 each 1 3/21/2024 --   [facility] [place], [place] - [address]    Sig: Use as directed by provider Per insurance coverage    Class: E-Prescribe    insulin pen needle (BD PEN NEEDLE SHERRIE 2ND GEN) 32G X 4 MM miscellaneous  100 each 11 2024 --   [facility] #[id number] - [place], [place] - [address]    Sig: USES 5 PER DAY    Class: E-Prescribe    Magnesium Bisglycinate (MAG GLYCINATE) 100 MG TABS  180 tablet 1 2024 --   [facility] #[id number] - [place], [place] - [address] [facility]    Sig: Take 1 tablet (100 mg) by mouth 2 times daily    Class: E-Prescribe    Route: Oral    multivitamin  w/minerals (THERA-VIT-M) tablet  90 tablet 0 9/11/2024 --   [facility] #[id number] - [place], [place] - [address]    Sig: Take 1 tablet by mouth daily.    Class: E-Prescribe    Route: Oral    mycophenolic acid (GENERIC EQUIVALENT) 180 MG EC tablet  180 tablet 2 8/23/2024 --   [facility] #[id number] - [place] - [address]    Sig: Take 1 tablet (180 mg) by mouth 2 times daily.    Class: E-Prescribe    Notes to Pharmacy: TXP DT 3/5/2024 (Liver) TXP Dischg DT 3/21/2024 DX Liver replaced by transplant Z94.4 [facility] ([place], [place])    Route: Oral    omeprazole (PRILOSEC) 20 MG DR capsule  180 capsule 1 8/3/2023 --       Sig: Take 20 mg by mouth daily    Class: Historical    Route: Oral    sodium zirconium cyclosilicate (LOKELMA) 10 g PACK packet  30 each 0 6/27/2024 --   [facility] - [place], [place] - [address]     Sig: Take 1 packet (10 g) by mouth daily as needed (Hyperkalemia)    Class: E-Prescribe    Route: Oral    Renewals       Renewal requests to authorizing provider (Fabi Aguirre, NP) <b>prohibited</b>            thin (NO BRAND SPECIFIED) lancets  100 each 6 10/24/2023 --   [facility] #[id number] - [place] - [address]    Sig: Use with lanceting device. To accompany: Blood Glucose Monitor Brands: per insurance.    Class: E-Prescribe          Other food allergy and Pineapple   REVIEW OF SYSTEMS (check box if normal)  [x]               GENERAL  [x]                 PULMONARY [x]                GENITOURINARY  [x]                CNS                 [x]                 CARDIAC  [x]                 ENDOCRINE  [x]                EARS,NOSE,THROAT [x]                 GASTROINTESTINAL [x]                 NEUROLOGIC    [x]                MUSCLOSKELTAL  [x]                  HEMATOLOGY      PHYSICAL EXAM (check box if  normal)/69   Pulse 88   Wt 97.3 kg (214 lb 6.4 oz)   SpO2 99%   BMI 30.76 kg/m      General: NAD  Abdomen - RLQ incision well healed.   R groin with normal appearance, no hernia noted. R inguinal area +TTP throughout.           PAIN SCALE:: 5    WDL

## 2024-11-08 NOTE — LETTER
11/8/2024      Mary Anayarowan Lopez  [address] [place] [place] [address]      Dear Colleague,    Thank you for referring your patient, Mary Lopez, to the [facility]. Please see a copy of my visit note below.    Transplant Surgery -OUTPATIENT PROGRESS NOTE    Date of Visit: 11/08/2024    Transplants:  3/5/2024 (Liver); Postoperative day:  248  ASSESMENT AND PLAN:  R Inguinal pain   +TTP on exam with no obvious bulge. US ordered to R/O abscess and recurrent hernia. Surgeon updated.   Rec heat/ice therapy. Robaxin PRN.     Date: November 8, 2024    Transplant:  [x]                             Liver [x]                              Kidney []                             Pancreas []                              Other:             Chief Complaint:RECHECK (Hernia repair)    History of Present Illness:  Hx of S/P liver tx 3/5/2024 and open inguinal hernia repair 9/18/2024.   4 days ago developed  pain in R inguinal area after doing activities at the end of the day. Every evening for the last 4 days. Admits he's been very active around the house and yard.   Improves with sitting but triggered by leg movement when sitting.   Feels similar to pain he had immediate post op from hernia repair. Sharp and consistent pain until he sits down.   No N/V. NO fever.   No bulge noted.         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)     CKD stage 3a, GFR 45-59 ml/min (H)     History of biliary duct stent placement     Inguinal hernia     SOCIAL /FAMILY HISTORY: [x]                  No recent change    Past Medical History:   Diagnosis Date      ANGEL (acute kidney injury) (H)      Alcoholic cirrhosis of liver without ascites (H) 07/13/2023     Alcoholic hepatitis with ascites (H) 10/03/2023     Alcoholic hepatitis without ascites (H) 07/13/2023     CKD stage 3a, GFR 45-59 ml/min (H) 08/08/2024     Closed fracture of one rib of left side 09/14/2023     Concussion without loss of consciousness 03/11/2020     Decompensated hepatic cirrhosis (H) 09/15/2023     Diabetes mellitus, type 2 (H) 11/22/2023     Essential hypertension 03/11/2020     Ganglion cyst of wrist, right 04/18/2024     History of biliary duct stent placement 09/05/2024     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)      Liver replaced by transplant (H) 03/05/2024     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    ACR PRAJAPATI 6-7/9, treated with steroids     Mild hyperlipidemia 12/07/2021     Persistent insomnia 07/13/2023     Portal hypertension (H) 07/13/2023     Right inguinal hernia 08/08/2024     Scrotal abscess      Secondary esophageal 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 (H) 07/13/2023     Tobacco abuse disorder 03/11/2020     Type 2 diabetes mellitus with hyperglycemia (H) 12/22/2023     Past Surgical History:   Procedure Laterality Date     BENCH LIVER  3/5/2024    Procedure: Bench liver;  Surgeon: Ryder Cortez MD;  Location: [facility] OR     CHOLECYSTECTOMY       COLONOSCOPY N/A 1/2/2024    Procedure: COLONOSCOPY, WITH POLYPECTOMY;  Surgeon: Jak Urbina MD;  Location: [facility] GI     CV RIGHT HEART CATH MEASUREMENTS RECORDED N/A 1/30/2024    Procedure: Right Heart Catheterization;  Surgeon: Alfred Tafoya MD;  Location:  HEART CARDIAC CATH LAB     ENDOSCOPIC RETROGRADE CHOLANGIOPANCREATOGRAM N/A 10/22/2024    Procedure: ENDOSCOPIC RETROGRADE CHOLANGIOPANCREATOGRAPHY, WITH bile duct stent stent exchange and balloon dilation;  Surgeon: Naldo Rodriguez MD;  Location:  OR     ENDOSCOPIC RETROGRADE CHOLANGIOPANCREATOGRAPHY, EXCHANGE TUBE/STENT N/A 8/13/2024    Procedure:  Endoscopic Retrograde Cholangiopancreatography with biliary sphincterotomy, balloon dilation, pancreatic stent placement,biliary stent exchange;  Surgeon: Naldo Rodriguez MD;  [facility]     ENTEROSCOPY SMALL BOWEL N/A 2024    Procedure: Enteroscopy small bowel;  Surgeon: Hugo Daigle MD;  [facility]     ESOPHAGOSCOPY, GASTROSCOPY, DUODENOSCOPY (EGD), COMBINED N/A 2024    Procedure: Esophagoscopy, gastroscopy, duodenoscopy (EGD), combined;  Surgeon: Hugo Daigle MD;  [facility]     HERNIORRHAPHY INGUINAL Right 2024    Procedure: Open Inguinal Hernia Repair;  Surgeon: Ryder Cortez MD;  [facility]     IR LIVER BIOPSY PERCUTANEOUS  3/15/2024     IR RETROPERITONEAL ABSCESS DRAINAGE  2024     TONSILLECTOMY       TRANSPLANT LIVER RECIPIENT  DONOR N/A 3/5/2024    Procedure: Transplant liver recipient  donor, bile duct stent placement;  Surgeon: Ryder Cortez MD;  [facility]     VASECTOMY       Social History     Socioeconomic History     Marital status:      Spouse name: Not on file     Number of children: Not on file     Years of education: Not on file     Highest education level: Not on file   Occupational History     Occupation: Not working now   Tobacco Use     Smoking status: Former     Types: Cigarettes     Passive exposure: Never     Smokeless tobacco: Current     Types: Chew     Last attempt to quit: 2004     Tobacco comments:     Chew daily 1/8 of a tin per day   Vaping Use     Vaping status: Never Used   Substance and Sexual Activity     Alcohol use: Not Currently     Alcohol/week: 12.0 standard drinks of alcohol     Types: 12 Standard drinks or equivalent per week     Comment: Sober since 2023     Drug use: Not Currently     Sexual activity: Yes     Partners: Female     Birth control/protection: Male Surgical   Other Topics Concern     Parent/sibling w/ CABG, MI or angioplasty before 65F 55M?  No   Social History Narrative     Not on file     Social Drivers of Health     Financial Resource Strain: Low Risk  (12/22/2023)    Financial Resource Strain      Within the past 12 months, have you or your family members you live with been unable to get utilities (heat, electricity) when it was really needed?: No   Food Insecurity: No Food Insecurity (6/22/2024)    Received from [facility]    Hunger Vital Sign      Worried About Running Out of Food in the Last Year: Never true      Ran Out of Food in the Last Year: Never true   Transportation Needs: No Transportation Needs (6/22/2024)    Received from [facility]    PRAPARE - Transportation      Lack of Transportation (Medical): No      Lack of Transportation (Non-Medical): No   Physical Activity: Not on file   Stress: Not on file   Social Connections: Not on file   Interpersonal Safety: Low Risk  (10/22/2024)    Interpersonal Safety      Do you feel physically and emotionally safe where you currently live?: Yes      Within the past 12 months, have you been hit, slapped, kicked or otherwise physically hurt by someone?: No      Within the past 12 months, have you been humiliated or emotionally abused in other ways by your partner or ex-partner?: No   Housing Stability: Low Risk  (6/22/2024)    Received from [facility]    Housing Stability Vital Sign      Unable to Pay for Housing in the Last Year: No      Number of Times Moved in the Last Year: 0      Homeless in the Last Year: No     Prescription Medications as of 11/8/2024         Rx Number Disp Refills Start End Last Dispensed Date Next Fill Date Owning Pharmacy    ACE/ARB/ARNI NOT PRESCRIBED (INTENTIONAL)  -- --  --       Sig: Please choose reason not prescribed from choices below.    Class: Historical    blood glucose (NO BRAND SPECIFIED) test strip  100 strip 6 10/24/2023 --   [id number] - [place], [place] - [address] [facility]    Sig: Use to test  blood sugar two times daily or as directed. To accompany: Blood Glucose Monitor Brands: per insurance.    Class: E-Prescribe    blood glucose monitoring (NO BRAND SPECIFIED) meter device kit  1 kit 0 10/24/2023 --   [facility] #[id number] - [place], [address]    Sig: Use to test blood sugar twice times daily or as directed. Preferred blood glucose meter OR supplies to accompany: Blood Glucose Monitor Brands: per insurance.    Class: E-Prescribe    Continuous Glucose Sensor (DEXCOM G7 SENSOR) MISC  3 each 5 8/27/2024 --   [facility] #[id number] - [place], [address]    Sig: Change every 10 days.    Class: E-Prescribe    cycloSPORINE modified (GENERIC EQUIVALENT) 100 MG capsule  60 capsule 11 11/1/2024 --   [facility] #[id number] - [place], [address]    Sig: Take 1 capsule (100 mg) by mouth every 12 hours. Total dose 150mg BID    Class: E-Prescribe    Route: Oral    cycloSPORINE modified (GENERIC EQUIVALENT) 25 MG capsule  540 capsule 3 11/1/2024 --   [facility] #[id number] - [place], [address]    Sig: Take 3 capsules (75 mg) by mouth every 12 hours. Total dose 175mg BID    Class: E-Prescribe    Route: Oral    empagliflozin (JARDIANCE) 25 MG TABS tablet  90 tablet 1 6/6/2024 --   [facility] #[id number] - [place], [address]    Sig: Take 1 tablet (25 mg) by mouth daily    Class: E-Prescribe    Route: Oral    gabapentin (NEURONTIN) 300 MG capsule  60 capsule 1 9/23/2024 --   [facility] #[id number] - [place], [address]    Sig: Take 1 capsule (300 mg) by mouth 2 times daily.    Class: E-Prescribe    Route: Oral    Glucagon (GVOKE HYPOPEN) 1 MG/0.2ML pen  0.4 mL 1 3/21/2024 --   [place], [place] - [address] [place] [address] [place]    Sig: Inject  the contents of 1 device under the skin into lower abdomen, outer thigh, or outer upper arm as needed for hypoglycemia. If no response after 15 minutes, additional 1 mg dose from a new device may be injected while waiting for emergency assistance.    Class: E-Prescribe    Notes to Pharmacy: Replaces [id number] per insurance formulary    Injection Device for insulin (CEQUR SIMPLICITY 2U) KHUSHBOO  10 each 5 2024 --   [facility] #[id number] - [place], [place] - [address] [facility]    Si each every 3 days    Class: E-Prescribe    Route: Does not apply    insulin degludec (TRESIBA FLEXTOUCH) 100 UNIT/ML pen  15 mL 5 2024 --   [facility] #[id number] - [place], [place] - [address] [facility]    Sig: Inject 16 units subcutaneous each am. Please do NOT fill today ( 2024 ).    Class: E-Prescribe    Insulin Lispro-aabc, 1 U Dial, (LYUMJEV KWIKPEN) 100 UNIT/ML SOPN  30 mL 2 2024 --   [facility] #[id number] - [place], [place] - [address] [facility]    Sig: Inject 6 Units Subcutaneous 3 times daily (with meals) 6 units with meals, plus correction. Pt may use up to 30 units daily. This REPLACES Humalog/Novolog insulin.    Class: E-Prescribe    Route: Subcutaneous    insulin pen needle (29G X 12.7MM) 29G X 12.7MM miscellaneous  90 each 1 2024 --   [facility] #[id number] - [place], [place] - [address] [facility]    Sig: Use 1 pen needle every three days or as directed.    Class: E-Prescribe    insulin pen needle (32G X 4 MM) 32G X 4 MM miscellaneous  200 each 1 3/21/2024 --   [facility] - [place], [place] - [address]     Sig: Use as directed by provider Per insurance coverage    Class: E-Prescribe    insulin pen needle (BD PEN NEEDLE SHERRIE 2ND GEN) 32G X 4 MM miscellaneous  100 each 11 2024 --   [facility] #[id number] - [place], [place] - [address] [facility]    Sig:  USES 5 PER DAY    Class: E-Prescribe    Magnesium Bisglycinate (MAG GLYCINATE) 100 MG TABS  180 tablet 1 8/19/2024 --   [facility] #[id number] - [place], [place] - [address]    Sig: Take 1 tablet (100 mg) by mouth 2 times daily    Class: E-Prescribe    Route: Oral    multivitamin w/minerals (THERA-VIT-M) tablet  90 tablet 0 9/11/2024 --   [facility] #[id number] - [place], [place] - [address] [place] [address]    Sig: Take 1 tablet by mouth daily.    Class: E-Prescribe    Route: Oral    mycophenolic acid (GENERIC EQUIVALENT) 180 MG EC tablet  180 tablet 2 8/23/2024 --   [facility] #[id number] - [place], [place] - [address]    Sig: Take 1 tablet (180 mg) by mouth 2 times daily.    Class: E-Prescribe    Notes to Pharmacy: TXP DT 3/5/2024 (Liver) TXP Dischg DT 3/21/2024 DX Liver replaced by transplant Z94.4 [facility] ([place], [place])    Route: Oral    omeprazole (PRILOSEC) 20 MG DR capsule  180 capsule 1 8/3/2023 --       Sig: Take 20 mg by mouth daily    Class: Historical    Route: Oral    sodium zirconium cyclosilicate (LOKELMA) 10 g PACK packet  30 each 0 6/27/2024 --   [facility] - [place], [place] - [address]    Sig: Take 1 packet (10 g) by mouth daily as needed (Hyperkalemia)    Class: E-Prescribe    Route: Oral    Renewals       Renewal requests to authorizing provider (Fabi Aguirre, NP) <b>prohibited</b>            thin (NO BRAND SPECIFIED) lancets  100 each 6 10/24/2023 --   [facility] #[id number] - [place], [place] - [address]    Sig: Use with lanceting device. To accompany: Blood Glucose Monitor Brands: per insurance.    Class: E-Prescribe          Other food allergy and Pineapple   REVIEW OF SYSTEMS (check box if normal)  [x]               GENERAL  [x]                 PULMONARY [x]                 GENITOURINARY  [x]                CNS                 [x]                 CARDIAC  [x]                 ENDOCRINE  [x]                EARS,NOSE,THROAT [x]                 GASTROINTESTINAL [x]                 NEUROLOGIC    [x]                MUSCLOSKELTAL  [x]                  HEMATOLOGY      PHYSICAL EXAM (check box if normal)/69   Pulse 88   Wt 97.3 kg (214 lb 6.4 oz)   SpO2 99%   BMI 30.76 kg/m      General: NAD  Abdomen - RLQ incision well healed.   R groin with normal appearance, no hernia noted. R inguinal area +TTP throughout.           PAIN SCALE:: 5       Again, thank you for allowing me to participate in the care of your patient.        Sincerely,        LESA Jeffries CNP

## 2024-11-11 ENCOUNTER — LAB (OUTPATIENT)
Dept: LAB | Facility: CLINIC | Age: 54
End: 2024-11-11
Payer: COMMERCIAL

## 2024-11-11 ENCOUNTER — ANCILLARY PROCEDURE (OUTPATIENT)
Dept: ULTRASOUND IMAGING | Facility: CLINIC | Age: 54
End: 2024-11-11
Attending: NURSE PRACTITIONER
Payer: COMMERCIAL

## 2024-11-11 ENCOUNTER — ANCILLARY PROCEDURE (OUTPATIENT)
Dept: GENERAL RADIOLOGY | Facility: CLINIC | Age: 54
End: 2024-11-11
Attending: INTERNAL MEDICINE
Payer: COMMERCIAL

## 2024-11-11 DIAGNOSIS — R52 PAIN: ICD-10-CM

## 2024-11-11 DIAGNOSIS — Z94.4 LIVER REPLACED BY TRANSPLANT (H): Chronic | ICD-10-CM

## 2024-11-11 DIAGNOSIS — K83.1 BILIARY STRICTURE (H): ICD-10-CM

## 2024-11-11 LAB
ALBUMIN SERPL BCG-MCNC: 4 G/DL (ref 3.5–5.2)
ALP SERPL-CCNC: 217 U/L (ref 40–150)
ALT SERPL W P-5'-P-CCNC: 40 U/L (ref 0–70)
ANION GAP SERPL CALCULATED.3IONS-SCNC: 9 MMOL/L (ref 7–15)
AST SERPL W P-5'-P-CCNC: 41 U/L (ref 0–45)
BILIRUB DIRECT SERPL-MCNC: <0.2 MG/DL (ref 0–0.3)
BILIRUB SERPL-MCNC: 0.4 MG/DL
BUN SERPL-MCNC: 36.5 MG/DL (ref 6–20)
CALCIUM SERPL-MCNC: 9.8 MG/DL (ref 8.8–10.4)
CHLORIDE SERPL-SCNC: 108 MMOL/L (ref 98–107)
CREAT SERPL-MCNC: 1.57 MG/DL (ref 0.67–1.17)
CYCLOSPORINE BLD LC/MS/MS-MCNC: 102 UG/L (ref 50–400)
EGFRCR SERPLBLD CKD-EPI 2021: 52 ML/MIN/1.73M2
ERYTHROCYTE [DISTWIDTH] IN BLOOD BY AUTOMATED COUNT: 13.1 % (ref 10–15)
GLUCOSE SERPL-MCNC: 208 MG/DL (ref 70–99)
HCO3 SERPL-SCNC: 23 MMOL/L (ref 22–29)
HCT VFR BLD AUTO: 36.2 % (ref 40–53)
HGB BLD-MCNC: 12.5 G/DL (ref 13.3–17.7)
MAGNESIUM SERPL-MCNC: 1.6 MG/DL (ref 1.7–2.3)
MCH RBC QN AUTO: 29.6 PG (ref 26.5–33)
MCHC RBC AUTO-ENTMCNC: 34.5 G/DL (ref 31.5–36.5)
MCV RBC AUTO: 86 FL (ref 78–100)
PHOSPHATE SERPL-MCNC: 5.2 MG/DL (ref 2.5–4.5)
PLATELET # BLD AUTO: 193 10E3/UL (ref 150–450)
POTASSIUM SERPL-SCNC: 4.8 MMOL/L (ref 3.4–5.3)
PROT SERPL-MCNC: 6.9 G/DL (ref 6.4–8.3)
RBC # BLD AUTO: 4.23 10E6/UL (ref 4.4–5.9)
SODIUM SERPL-SCNC: 140 MMOL/L (ref 135–145)
TME LAST DOSE: NORMAL H
TME LAST DOSE: NORMAL H
WBC # BLD AUTO: 5.7 10E3/UL (ref 4–11)

## 2024-11-11 PROCEDURE — 36415 COLL VENOUS BLD VENIPUNCTURE: CPT

## 2024-11-11 PROCEDURE — 82248 BILIRUBIN DIRECT: CPT

## 2024-11-11 PROCEDURE — 80158 DRUG ASSAY CYCLOSPORINE: CPT

## 2024-11-11 PROCEDURE — 76705 ECHO EXAM OF ABDOMEN: CPT | Mod: GC | Performed by: STUDENT IN AN ORGANIZED HEALTH CARE EDUCATION/TRAINING PROGRAM

## 2024-11-11 PROCEDURE — 74019 RADEX ABDOMEN 2 VIEWS: CPT | Mod: TC | Performed by: INTERNAL MEDICINE

## 2024-11-11 PROCEDURE — 80053 COMPREHEN METABOLIC PANEL: CPT

## 2024-11-11 PROCEDURE — 83735 ASSAY OF MAGNESIUM: CPT

## 2024-11-11 PROCEDURE — 84100 ASSAY OF PHOSPHORUS: CPT

## 2024-11-11 PROCEDURE — 85027 COMPLETE CBC AUTOMATED: CPT

## 2024-11-14 ENCOUNTER — MYC REFILL (OUTPATIENT)
Dept: TRANSPLANT | Facility: CLINIC | Age: 54
End: 2024-11-14
Payer: COMMERCIAL

## 2024-11-14 ENCOUNTER — TELEPHONE (OUTPATIENT)
Dept: FAMILY MEDICINE | Facility: CLINIC | Age: 54
End: 2024-11-14
Payer: COMMERCIAL

## 2024-11-14 ENCOUNTER — MYC MEDICAL ADVICE (OUTPATIENT)
Dept: FAMILY MEDICINE | Facility: CLINIC | Age: 54
End: 2024-11-14
Payer: COMMERCIAL

## 2024-11-14 DIAGNOSIS — R52 PAIN: ICD-10-CM

## 2024-11-14 RX ORDER — METHOCARBAMOL 750 MG/1
750 TABLET, FILM COATED ORAL 3 TIMES DAILY PRN
Qty: 15 TABLET | Refills: 0 | Status: SHIPPED | OUTPATIENT
Start: 2024-11-14

## 2024-11-14 NOTE — TELEPHONE ENCOUNTER
Called pt's wife, Adina (consent to communicate on file) to triage symptoms and set up appointment.    Says pt has been having ongoing pain in his lower right abdomen where he had his inguinal hernia surgery. Had appt with Transplant Surgery Outpatient on 11/08 and xray and US of the area were negative for any abscess or recurrent hernia.     Pt's wife also describes pt having pain in both of his hands with the cold weather outside. Says his transplant providers do not believe it is due to the transplant or his medication and suggested he speak with his PCP about it.    Rates groin pain 7-8 in the morning. Feels stiff and tries to get comfortable throughout the day, but anytime he goes from no activity to getting up and moving around, says it very uncomfortable. Has been using heat and ice without much relief.    Assisted wife with scheduling appt for next Tuesday at 4pm with a 340 check in. Pt to call back if any new or worsening symptoms. Will route message to PCP to see if in-person is necessary or if pt can do a virtual.    Evelyn Louie, RN, BSN  [facility] Primary Care Triage

## 2024-11-14 NOTE — TELEPHONE ENCOUNTER
Please see TC 11/14/24.    Evelyn Louie, RN, BSN  -[facility] Primary Care Triage

## 2024-11-15 NOTE — TELEPHONE ENCOUNTER
Patient is scheduled for in-person visit.    No further action needed.    Sherlyn Bledsoe RN, BSN  [facility]

## 2024-11-18 ENCOUNTER — VIRTUAL VISIT (OUTPATIENT)
Dept: EDUCATION SERVICES | Facility: CLINIC | Age: 54
End: 2024-11-18
Payer: COMMERCIAL

## 2024-11-18 DIAGNOSIS — E11.9 TYPE 2 DIABETES MELLITUS WITHOUT COMPLICATION, WITH LONG-TERM CURRENT USE OF INSULIN (H): Primary | ICD-10-CM

## 2024-11-18 DIAGNOSIS — Z79.4 TYPE 2 DIABETES MELLITUS WITHOUT COMPLICATION, WITH LONG-TERM CURRENT USE OF INSULIN (H): Primary | ICD-10-CM

## 2024-11-18 NOTE — PROGRESS NOTES
Virtual Visit Details    Type of service:  Video Visit   Joined the call at 11/18/2024, 8:35:48 am.  Left the call at 11/18/2024, 8:58:58 am.  You were on the call for 23 minutes 10 seconds  Originating Location (pt. Location): Home    Distant Location (provider location):  On-site  Platform used for Video Visit: [employer]    Diabetes Self-Management Education & Support    Mary Lopez presents today for education related to Type 2 diabetes    Patient is being treated with:  insulin  He is accompanied by spouse    Year of diagnosis: 2023  Referring provider:  Lai  Patient is followed by Suzanne THOMPSON, now followed by Geovanna THOMPSON  Living Situation: with spouse   Employment: n/a    PATIENT CONCERNS RELATED TO DIABETES SELF MANAGEMENT:       ASSESSMENT:    Taking Medication:     Current Diabetes Management per Patient:  Taking diabetes medications? yes:     Diabetes Medication(s)       Diabetic Other       Glucagon (GVOKE HYPOPEN) 1 MG/0.2ML pen Inject the contents of 1 device under the skin into lower abdomen, outer thigh, or outer upper arm as needed for hypoglycemia. If no response after 15 minutes, additional 1 mg dose from a new device may be injected while waiting for emergency assistance.       Insulin       insulin degludec (TRESIBA FLEXTOUCH) 100 UNIT/ML pen Inject 16 units subcutaneous each am. Please do NOT fill today ( 9/4/2024 ).     Insulin Lispro-aabc, 1 U Dial, (LYUMJEV KWIKPEN) 100 UNIT/ML SOPN Inject 6 Units Subcutaneous 3 times daily (with meals) 6 units with meals, plus correction. Pt may use up to 30 units daily. This REPLACES Humalog/Novolog insulin.       Sodium-Glucose Co-Transporter 2 (SGLT2) Inhibitors       empagliflozin (JARDIANCE) 25 MG TABS tablet Take 1 tablet (25 mg) by mouth daily            Monitoring                        Patient's most recent   Lab Results   Component Value Date    A1C 8.1 10/31/2023        Healthy Eating:   encouraged consistent carb  diet  Encouraged to reduce added sugar  Have protein and vegetables with carbohydrate at meals  Avoid sweetened beverages.    Problem Solving:      Patient is at risk of hypoglycemia?: YES  Hospitalizations for hyper or hypoglycemia: No      EDUCATION and INSTRUCTION PROVIDED AT THIS VISIT:      Follow up with Mary and Adina today. Mary's time in range is improving but his glucose is still very erratic. Taking Tresiba 20 units. Lyumjev 8 units with meals plus correction 1/50/140/200. Mary has been occasionally giving himself insulin so those doses are not documented on reports. Otherwise, Adina will help and will document the doses in dexcom G7 emy. He has had a couple low glucose readings per week either overnight or after eating. There are still significant missed doses and especially delayed Lyumjev doses. Mary will be starting Cequr on Friday and is hopeful that this will help him get his mealtime insulin before he eats as well as to cover all his meals and snacks. We will follow up on Tuesday to see how that is going and take a look at the reports and make any adjustments to his insulin regimen. He does not want to consider any other dm medication today.     PLAN:  Tresiba to 20 units   Lyumjev 8 units with meals (4 clicks with Cequr) plus correction 1/50/140/200  1 Cequr click (2 units) with a snack      Patient-stated goal written and given to Mary Lopez.  Verbalized and demonstrated understanding of instructions.       FOLLOW-UP:      Tuesday   Any diabetes medication dose changes were made via the CDE Protocol and Collaborative Practice Agreement with [place] and  [facility].  A copy of this encounter was provided to patient's referring provider.

## 2024-11-18 NOTE — NURSING NOTE
Current patient location:  [place]    Is the patient currently in the state of [place]? YES    Visit mode:VIDEO    If the visit is dropped, the patient can be reconnected by:VIDEO VISIT: Text to cell phone:   Telephone Information:   Mobile [phone]   Mobile [phone]       Will anyone else be joining the visit? NO  (If patient encounters technical issues they should call [phone]    Are changes needed to the allergy or medication list? No    Are refills needed on medications prescribed by this physician? NO    Rooming Documentation:  Questionnaire(s) completed    Reason for visit: Video Visit and RECHECK    Alena CHRISTY

## 2024-11-19 ENCOUNTER — OFFICE VISIT (OUTPATIENT)
Dept: FAMILY MEDICINE | Facility: CLINIC | Age: 54
End: 2024-11-19
Payer: COMMERCIAL

## 2024-11-19 VITALS
WEIGHT: 219.6 LBS | TEMPERATURE: 98.3 F | HEART RATE: 88 BPM | OXYGEN SATURATION: 98 % | SYSTOLIC BLOOD PRESSURE: 109 MMHG | HEIGHT: 69 IN | DIASTOLIC BLOOD PRESSURE: 69 MMHG | BODY MASS INDEX: 32.53 KG/M2

## 2024-11-19 DIAGNOSIS — E11.9 TYPE 2 DIABETES MELLITUS WITHOUT COMPLICATION, WITH LONG-TERM CURRENT USE OF INSULIN (H): Chronic | ICD-10-CM

## 2024-11-19 DIAGNOSIS — M25.50 ARTHRALGIA, UNSPECIFIED JOINT: ICD-10-CM

## 2024-11-19 DIAGNOSIS — M79.10 MYALGIA: Primary | ICD-10-CM

## 2024-11-19 DIAGNOSIS — I73.00 RAYNAUD'S DISEASE WITHOUT GANGRENE: ICD-10-CM

## 2024-11-19 DIAGNOSIS — Z79.4 TYPE 2 DIABETES MELLITUS WITHOUT COMPLICATION, WITH LONG-TERM CURRENT USE OF INSULIN (H): Chronic | ICD-10-CM

## 2024-11-19 PROCEDURE — G2211 COMPLEX E/M VISIT ADD ON: HCPCS | Performed by: INTERNAL MEDICINE

## 2024-11-19 PROCEDURE — 99214 OFFICE O/P EST MOD 30 MIN: CPT | Performed by: INTERNAL MEDICINE

## 2024-11-19 NOTE — PROGRESS NOTES
"  Assessment & Plan     1.  Raynaud's phenomena affecting the hands based on symptoms of cold exposure related symptoms.  Will investigate further by checking CRP, ESR, BURTON, TSH.  2.  Generalized arthralgia worse since liver transplant.  Also muscle stiffness.  3.  Myalgia and muscle stiffness increase since liver transplant.  I will be checking CPK.  4.  Type 2 diabetes mellitus.    I will check the above-mentioned lab and get back to the patient.  May consider trial of amlodipine low-dose for clinically suspected Raynaud's phenomena.  Patient has an appointment next week for at the pain clinic for chronic pain.  Make consider increasing the dose of gabapentin.      BMI  Estimated body mass index is 32.43 kg/m  as calculated from the following:    Height as of this encounter: 1.753 m (5' 9\").    Weight as of this encounter: 99.6 kg (219 lb 9.6 oz).         Elmo Hernandez is a 54 year old, presenting for the following health issues:  Groin Pain (Right)      11/19/2024     3:58 PM   Additional Questions   Roomed by Leonor EPPERSON   Accompanied by Self         11/19/2024     3:58 PM   Patient Reported Additional Medications   Patient reports taking the following new medications None     History of Present Illness       Reason for visit:  Bilateral hand pain with cold  Symptom onset:  3-4 weeks ago  Symptom intensity:  Severe  Symptom progression:  Staying the same  Had these symptoms before:  No  What makes it better:  Heating them up   He is taking medications regularly.       54-year-old with status post liver transplant comes in accompanied by his wife.  Twice it happened that when he went out in the cold both his hands started hurting.  The symptoms are worst in the little and ring finger.  After getting the hands under the blanket his symptoms gradually improved over the next for 10 to 15 minutes.  He also complains of chronic pain all over particularly joint pain and muscle aching as well as stiffness.  It is " Subjective:       Patient ID: Efra Payton III is a 61 y.o. male.    Chief Complaint:   Hypertension and Dizziness    HPI - Multiple complaints.  He has been struggling with headaches and a quick temper for the past few weeks.  Hasn't been taking his antihypertensives or his bupropion in a long time; says he forgets to take them.  However, he always remembers his methadone, which he keeps in a safe at home.  He's due for a good bit of health maintenance.    PMH/Meds:  Reviewed and reconciled in EPIC with patient during visit today.    Review of Systems   Constitutional: Negative for fever.   HENT: Negative for congestion.    Respiratory: Negative for shortness of breath.    Cardiovascular: Negative for chest pain.   Gastrointestinal: Negative for abdominal pain.   Genitourinary: Negative for difficulty urinating.   Musculoskeletal: Negative for arthralgias.   Skin: Negative for rash.   Neurological: Negative for headaches.   Psychiatric/Behavioral: The patient is nervous/anxious.        Objective:      Physical Exam  Vitals signs reviewed.   Constitutional:       Appearance: Normal appearance. He is normal weight.      Comments: Friendly compa.  Mildly pressured speech   HENT:      Head: Normocephalic and atraumatic.   Neurological:      General: No focal deficit present.   Psychiatric:         Mood and Affect: Mood normal.         Assessment:       1. Essential hypertension    2. Chronic obstructive pulmonary disease, unspecified COPD type    3. Methadone maintenance therapy patient    4. Nicotine dependence, cigarettes, uncomplicated    5. Screening for malignant neoplasm of colon        Plan:       Efra was seen today for hypertension and dizziness.    Diagnoses and all orders for this visit:    Essential hypertension - way out of range.  Reasonable to blame this for his headache and difficulty with concentration.  Restarting amlodipine.  Recommended he keep this with his methadone and take it at the same time  -      amLODIPine (NORVASC) 5 MG tablet; Take 1 tablet (5 mg total) by mouth once daily.  -     Comprehensive Metabolic Panel; Future    Chronic obstructive pulmonary disease, unspecified COPD type - stable on inhalers; discussed use of these    Methadone maintenance therapy patient - as above; taking daily  -     HIV 1/2 Ag/Ab (4th Gen); Future  -     Hepatitis C Antibody; Future    Nicotine dependence, cigarettes, uncomplicated - restarting wellbutrin, once daily.  Both for nicotine and mood disorder  -     buPROPion (WELLBUTRIN SR) 150 MG TBSR 12 hr tablet; Take 1 tablet (150 mg total) by mouth once daily.    Screening for malignant neoplasm of colon - agreed to cologuard  -     Cologuard Screening (Multitarget Stool DNA); Future  -     Cologuard Screening (Multitarget Stool DNA)    rtc one month, BP check    G Aaron Mak MD MPH  Staff Internist            worse since liver transplant.  He does have an appointment with the pain clinic next week.      Review of Systems  Constitutional, HEENT, cardiovascular, pulmonary, GI, , musculoskeletal, neuro, skin, endocrine and psych systems are negative, except as otherwise noted.      Objective    There were no vitals taken for this visit.  There is no height or weight on file to calculate BMI.  Physical Exam   GENERAL: alert and no distress  NECK: no adenopathy, no asymmetry, masses, or scars  RESP: lungs clear to auscultation - no rales, rhonchi or wheezes  CV: regular rate and rhythm, normal S1 S2, no S3 or S4, no murmur, click or rub, no peripheral edema  ABDOMEN: soft, nontender, no hepatosplenomegaly, no masses and bowel sounds normal  MS: no gross musculoskeletal defects noted, no edema.  Examination of the joints reveal no evidence of synovial inflammation or swelling.  No tenderness.            Signed Electronically by: Jimmie Hoyt MD

## 2024-11-20 ENCOUNTER — TELEPHONE (OUTPATIENT)
Dept: FAMILY MEDICINE | Facility: CLINIC | Age: 54
End: 2024-11-20
Payer: COMMERCIAL

## 2024-11-20 NOTE — TELEPHONE ENCOUNTER
Pt and wife (CTC on file) were notified of MRI and ins pending. MRI and follow up appt with provider cancelled.  Kiera Aguilera CMA

## 2024-11-20 NOTE — TELEPHONE ENCOUNTER
It is okay to delay the MRI of the knee pending insurance approval.  Patient was seen yesterday in the clinic and his knee pain had slightly improved.  The scan is not needed urgently.

## 2024-11-22 ENCOUNTER — MYC REFILL (OUTPATIENT)
Dept: TRANSPLANT | Facility: CLINIC | Age: 54
End: 2024-11-22
Payer: COMMERCIAL

## 2024-11-22 ENCOUNTER — TELEPHONE (OUTPATIENT)
Dept: TRANSPLANT | Facility: CLINIC | Age: 54
End: 2024-11-22
Payer: COMMERCIAL

## 2024-11-22 DIAGNOSIS — R52 PAIN: ICD-10-CM

## 2024-11-22 NOTE — TELEPHONE ENCOUNTER
Issue:  Received refill for robaxin- no refills on prescription.     Plan:  Call placed to patient to assess pain/need for prescription. RNCC unable to reach him. Left detailed VM that I am unable to refill without order from MD. RNCC asked that he seek care in ED if pain is severe over the weekend, otherwise, we can address next week.

## 2024-11-23 RX ORDER — METHOCARBAMOL 750 MG/1
750 TABLET, FILM COATED ORAL 3 TIMES DAILY PRN
Qty: 15 TABLET | Refills: 0 | Status: SHIPPED | OUTPATIENT
Start: 2024-11-23

## 2024-11-25 ENCOUNTER — LAB (OUTPATIENT)
Dept: LAB | Facility: CLINIC | Age: 54
End: 2024-11-25
Payer: COMMERCIAL

## 2024-11-25 DIAGNOSIS — I73.00 RAYNAUD'S DISEASE WITHOUT GANGRENE: ICD-10-CM

## 2024-11-25 DIAGNOSIS — N18.31 CKD STAGE 3A, GFR 45-59 ML/MIN (H): ICD-10-CM

## 2024-11-25 DIAGNOSIS — Z94.4 LIVER REPLACED BY TRANSPLANT (H): Chronic | ICD-10-CM

## 2024-11-25 DIAGNOSIS — M25.50 ARTHRALGIA, UNSPECIFIED JOINT: ICD-10-CM

## 2024-11-25 DIAGNOSIS — M79.10 MYALGIA: ICD-10-CM

## 2024-11-25 LAB
ALBUMIN SERPL BCG-MCNC: 3.9 G/DL (ref 3.5–5.2)
ALP SERPL-CCNC: 277 U/L (ref 40–150)
ALT SERPL W P-5'-P-CCNC: 52 U/L (ref 0–70)
ANION GAP SERPL CALCULATED.3IONS-SCNC: 9 MMOL/L (ref 7–15)
AST SERPL W P-5'-P-CCNC: 41 U/L (ref 0–45)
BILIRUB DIRECT SERPL-MCNC: <0.2 MG/DL (ref 0–0.3)
BILIRUB SERPL-MCNC: 0.4 MG/DL
BUN SERPL-MCNC: 33.8 MG/DL (ref 6–20)
CALCIUM SERPL-MCNC: 9.8 MG/DL (ref 8.8–10.4)
CHLORIDE SERPL-SCNC: 101 MMOL/L (ref 98–107)
CK SERPL-CCNC: 52 U/L (ref 39–308)
CREAT SERPL-MCNC: 1.82 MG/DL (ref 0.67–1.17)
CREAT UR-MCNC: 63.3 MG/DL
CRP SERPL-MCNC: 8.62 MG/L
CYCLOSPORINE BLD LC/MS/MS-MCNC: 129 UG/L (ref 50–400)
EGFRCR SERPLBLD CKD-EPI 2021: 44 ML/MIN/1.73M2
ERYTHROCYTE [DISTWIDTH] IN BLOOD BY AUTOMATED COUNT: 13 % (ref 10–15)
ERYTHROCYTE [SEDIMENTATION RATE] IN BLOOD BY WESTERGREN METHOD: 20 MM/HR (ref 0–20)
GLUCOSE SERPL-MCNC: 288 MG/DL (ref 70–99)
HCO3 SERPL-SCNC: 27 MMOL/L (ref 22–29)
HCT VFR BLD AUTO: 36.6 % (ref 40–53)
HGB BLD-MCNC: 12.3 G/DL (ref 13.3–17.7)
MAGNESIUM SERPL-MCNC: 2.1 MG/DL (ref 1.7–2.3)
MCH RBC QN AUTO: 29.4 PG (ref 26.5–33)
MCHC RBC AUTO-ENTMCNC: 33.6 G/DL (ref 31.5–36.5)
MCV RBC AUTO: 87 FL (ref 78–100)
MICROALBUMIN UR-MCNC: <12 MG/L
MICROALBUMIN/CREAT UR: NORMAL MG/G{CREAT}
PHOSPHATE SERPL-MCNC: 5.4 MG/DL (ref 2.5–4.5)
PLATELET # BLD AUTO: 230 10E3/UL (ref 150–450)
POTASSIUM SERPL-SCNC: 5.9 MMOL/L (ref 3.4–5.3)
PROT SERPL-MCNC: 7.1 G/DL (ref 6.4–8.3)
RBC # BLD AUTO: 4.19 10E6/UL (ref 4.4–5.9)
SODIUM SERPL-SCNC: 137 MMOL/L (ref 135–145)
TME LAST DOSE: NORMAL H
TME LAST DOSE: NORMAL H
TSH SERPL DL<=0.005 MIU/L-ACNC: 1.07 UIU/ML (ref 0.3–4.2)
WBC # BLD AUTO: 6.4 10E3/UL (ref 4–11)

## 2024-11-25 PROCEDURE — 83735 ASSAY OF MAGNESIUM: CPT

## 2024-11-25 PROCEDURE — 80053 COMPREHEN METABOLIC PANEL: CPT

## 2024-11-25 PROCEDURE — 82043 UR ALBUMIN QUANTITATIVE: CPT

## 2024-11-25 PROCEDURE — 86038 ANTINUCLEAR ANTIBODIES: CPT

## 2024-11-25 PROCEDURE — 85652 RBC SED RATE AUTOMATED: CPT

## 2024-11-25 PROCEDURE — 36415 COLL VENOUS BLD VENIPUNCTURE: CPT

## 2024-11-25 PROCEDURE — 84443 ASSAY THYROID STIM HORMONE: CPT

## 2024-11-25 PROCEDURE — 85027 COMPLETE CBC AUTOMATED: CPT

## 2024-11-25 PROCEDURE — 82570 ASSAY OF URINE CREATININE: CPT

## 2024-11-25 PROCEDURE — 84100 ASSAY OF PHOSPHORUS: CPT

## 2024-11-25 PROCEDURE — 82550 ASSAY OF CK (CPK): CPT

## 2024-11-25 PROCEDURE — 86140 C-REACTIVE PROTEIN: CPT

## 2024-11-25 PROCEDURE — 80158 DRUG ASSAY CYCLOSPORINE: CPT

## 2024-11-25 PROCEDURE — 82248 BILIRUBIN DIRECT: CPT

## 2024-11-26 ENCOUNTER — TELEPHONE (OUTPATIENT)
Dept: TRANSPLANT | Facility: CLINIC | Age: 54
End: 2024-11-26

## 2024-11-26 ENCOUNTER — MYC REFILL (OUTPATIENT)
Dept: TRANSPLANT | Facility: CLINIC | Age: 54
End: 2024-11-26

## 2024-11-26 ENCOUNTER — VIRTUAL VISIT (OUTPATIENT)
Dept: EDUCATION SERVICES | Facility: CLINIC | Age: 54
End: 2024-11-26
Payer: COMMERCIAL

## 2024-11-26 VITALS — HEIGHT: 69 IN | BODY MASS INDEX: 32.44 KG/M2 | WEIGHT: 219 LBS

## 2024-11-26 DIAGNOSIS — R30.0 DYSURIA: ICD-10-CM

## 2024-11-26 DIAGNOSIS — E11.9 TYPE 2 DIABETES MELLITUS WITHOUT COMPLICATION, WITH LONG-TERM CURRENT USE OF INSULIN (H): Primary | ICD-10-CM

## 2024-11-26 DIAGNOSIS — Z79.4 TYPE 2 DIABETES MELLITUS WITHOUT COMPLICATION, WITH LONG-TERM CURRENT USE OF INSULIN (H): Primary | ICD-10-CM

## 2024-11-26 DIAGNOSIS — E87.5 HYPERKALEMIA: Primary | ICD-10-CM

## 2024-11-26 DIAGNOSIS — R79.89 ABNORMAL LIVER FUNCTION TESTS: ICD-10-CM

## 2024-11-26 LAB — ANA SER QL IF: NEGATIVE

## 2024-11-26 PROCEDURE — 99207 PR NO BILLABLE SERVICE THIS VISIT: CPT | Mod: 95 | Performed by: NUTRITIONIST

## 2024-11-26 RX ORDER — GABAPENTIN 300 MG/1
300 CAPSULE ORAL 2 TIMES DAILY
Qty: 60 CAPSULE | Refills: 1 | Status: SHIPPED | OUTPATIENT
Start: 2024-11-26

## 2024-11-26 ASSESSMENT — PAIN SCALES - GENERAL: PAINLEVEL_OUTOF10: EXTREME PAIN (8)

## 2024-11-26 NOTE — TELEPHONE ENCOUNTER
"Message from Dr. Muniz re: elevated potassium and lft's:    Hugo Daigle MD sent to Lore Mckeon, RN  We can give Lokelma.    I think he needs repeat ERCP with stent placement as alk phos never normalized.    Thanks    Call to Adina.  She says Mary has been drinking more vitamin water.  She will ask him to cut back.  She will  the lokelma.     Mary got on the phone.  He says the pain and effort involved in getting up every day is more than what it takes for him to do what he needs to do each day.  He feels his stamina is low and he is not where he should be in terms of being able to work out / do activity.  He doesn't want to live w/ this.  \"I need pain killers\" - they make the pain tolerable to be able to work throughout the day.   \"It helps me out so much\".  \"I don't want to live like this!  It takes everything I can to get in and out of a car.\"  Mary has an appt w/ the pain clinic this Friday.    Mary will recheck labs     Message to ERCP team (Annemarie/ Virgil) re: Dr. Muniz's request for ERCP  "

## 2024-11-26 NOTE — PROGRESS NOTES
Virtual Visit Details    Type of service:  Video Visit   Joined the call at 11/26/2024, 8:34:09 am.  Left the call at 11/26/2024, 8:52:36 am.  You were on the call for 18 minutes 27 seconds .  Originating Location (pt. Location): Home    Distant Location (provider location):  Off-site  Platform used for Video Visit: qcue    Diabetes Self-Management Education & Support    Mary Lopez presents today for education related to Type 2 diabetes    Patient is being treated with:  insulin  He is accompanied by spouse    Year of diagnosis: 2023  Referring provider:  Lai  Patient is followed by Suzanne THOMPSON, now followed by Geovanna THOMPSON  Living Situation: with spouse   Employment: n/a    PATIENT CONCERNS RELATED TO DIABETES SELF MANAGEMENT:       ASSESSMENT:    Taking Medication:     Current Diabetes Management per Patient:  Taking diabetes medications? yes:     Diabetes Medication(s)       Diabetic Other       Glucagon (GVOKE HYPOPEN) 1 MG/0.2ML pen Inject the contents of 1 device under the skin into lower abdomen, outer thigh, or outer upper arm as needed for hypoglycemia. If no response after 15 minutes, additional 1 mg dose from a new device may be injected while waiting for emergency assistance.       Insulin       insulin degludec (TRESIBA FLEXTOUCH) 100 UNIT/ML pen Inject 16 units subcutaneous each am. Please do NOT fill today ( 9/4/2024 ).     Insulin Lispro-aabc, 1 U Dial, (LYUMJEV KWIKPEN) 100 UNIT/ML SOPN Inject 6 Units Subcutaneous 3 times daily (with meals) 6 units with meals, plus correction. Pt may use up to 30 units daily. This REPLACES Humalog/Novolog insulin.       Sodium-Glucose Co-Transporter 2 (SGLT2) Inhibitors       empagliflozin (JARDIANCE) 25 MG TABS tablet Take 1 tablet (25 mg) by mouth daily            Monitoring                    Patient's most recent   Lab Results   Component Value Date    A1C 8.1 10/31/2023        Healthy Eating:   encouraged consistent carb diet  Encouraged  to reduce added sugar  Have protein and vegetables with carbohydrate at meals  Avoid sweetened beverages.    Problem Solving:      Patient is at risk of hypoglycemia?: YES  Hospitalizations for hyper or hypoglycemia: No      EDUCATION and INSTRUCTION PROVIDED AT THIS VISIT:      Follow up with Mary and Adina today. Mary's time in range is improving but his glucose is still very erratic. Taking Tresiba 20 units. Lyumjev 8 units with meals plus correction 1/50/140/200. Mary has been occasionally giving himself insulin so those doses are not documented on reports. Otherwise, Adina will help and will document the doses in dexcom G7 emy.   Patient and spouse got trained on Cequr patches and started using it, however, it sounds like he had some faulty patches as they locked up even though they still had plenty of insulin in them. Patient is concerned about continuing to try to use them and potentially wasting more insulin. They are not . He is due for a refill so they plan to pick that up and give the new patches a try. Otherwise, there was no issue with using the cequr patches.   There are still significant missed doses and especially delayed Lyumjev doses. Mary is not sure what/what he is eating when looking at the reports to try to assess how often this is happening and what foods are contributing to his spikes. He says he would like to keep a food journal this week which will help a lot.     PLAN:  Tresiba to 20 units   Lyumjev 8 units with meals (4 clicks with Cequr) plus correction 1/50/140/200  1 Cequr click (2 units) with a snack    Reminded Mary to take insulin ten minutes before meals and snacks.  Reminded Mary to avoid sugary beverages and high carb meals.       Patient-stated goal written and given to Mary Luis Lopez.  Verbalized and demonstrated understanding of instructions.       FOLLOW-UP:      scheduled  Any diabetes medication dose changes were made via the CDE Protocol and  Collaborative Practice Agreement with [facility] and  [facility].  A copy of this encounter was provided to patient's referring provider.

## 2024-11-26 NOTE — NURSING NOTE
Current patient location: [address] [place] [address]  [place] [address]    Is the patient currently in the state of [place]? YES    Visit mode:VIDEO    If the visit is dropped, the patient can be reconnected by:VIDEO VISIT: Text to cell phone:   Telephone Information:   [employer]    Mobile [phone]       Will anyone else be joining the visit? NO  (If patient encounters technical issues they should call [phone]    Are changes needed to the allergy or medication list? No    Are refills needed on medications prescribed by this physician? NO    Rooming Documentation:  Patient checked in by spouse.     Reason for visit: Diabetes Education    Qing CHRISTY

## 2024-11-27 ENCOUNTER — PREP FOR PROCEDURE (OUTPATIENT)
Dept: GASTROENTEROLOGY | Facility: CLINIC | Age: 54
End: 2024-11-27
Payer: COMMERCIAL

## 2024-11-27 ENCOUNTER — PATIENT OUTREACH (OUTPATIENT)
Dept: GASTROENTEROLOGY | Facility: CLINIC | Age: 54
End: 2024-11-27
Payer: COMMERCIAL

## 2024-11-27 DIAGNOSIS — R79.89 ELEVATED LFTS: Primary | ICD-10-CM

## 2024-11-27 NOTE — TELEPHONE ENCOUNTER
Patient referred back for ERCP from [facility]. Per Dr Martínez:    Please assist in scheduling:     Procedure/Imaging/Clinic: Endoscopic Retrograde Cholangiopancreatography (ERCP)  Physician: Tanya  Timin/18  Scope time needed:provider average  Anesthesia:General  Dx: elevated lft  Tier:Tier 3 -   Surgeries/procedures that can be delayed  between 30 to 90 days with no significant  morbidity/mortality to patient or impact on  patient/disease outcome.   Location: [facility] to schedule while attending: shawanda  Header of letter for pt communication: Endoscopic Retrograde Cholangiopancreatography (ERCP)  Preop op: see office note 24      Does patient have any history of gastric bypass/gastric surgery/altered panc/bili anatomy?no    Any recent Covid symptoms or positive covid test?no    Does patient have [employer] insurance?:no    Med Review    Blood thinner -  no  ASA - no  Diabetic - yes  Any meds by injection or mouth for weight loss or diabetes-no    Patient Education r/t procedure:done    A pre-op nurse will call 1-2 days prior to the procedure.    NPO/Prep:   Adults and Children of all ages may consume solids up to 8 hours prior to arrival time - may consume clear liquids up to 1 hour prior to arrival time.    Other specific details/comments:none     Verbalized understanding of all instructions. All questions answered.     Procedure order placed, message routed to OR / Endo

## 2024-12-01 ENCOUNTER — LAB (OUTPATIENT)
Dept: LAB | Facility: CLINIC | Age: 54
End: 2024-12-01
Payer: COMMERCIAL

## 2024-12-01 DIAGNOSIS — Z51.81 ENCOUNTER FOR THERAPEUTIC DRUG MONITORING: ICD-10-CM

## 2024-12-01 DIAGNOSIS — E87.5 HYPERKALEMIA: ICD-10-CM

## 2024-12-01 DIAGNOSIS — R79.89 ABNORMAL LIVER FUNCTION TESTS: ICD-10-CM

## 2024-12-01 LAB
ALBUMIN SERPL BCG-MCNC: 4 G/DL (ref 3.5–5.2)
ALP SERPL-CCNC: 249 U/L (ref 40–150)
ALT SERPL W P-5'-P-CCNC: 33 U/L (ref 0–70)
AMPHETAMINES UR QL SCN: ABNORMAL
ANION GAP SERPL CALCULATED.3IONS-SCNC: 9 MMOL/L (ref 7–15)
AST SERPL W P-5'-P-CCNC: 38 U/L (ref 0–45)
BARBITURATES UR QL SCN: ABNORMAL
BENZODIAZ UR QL SCN: ABNORMAL
BILIRUB DIRECT SERPL-MCNC: <0.2 MG/DL (ref 0–0.3)
BILIRUB SERPL-MCNC: 0.4 MG/DL
BUN SERPL-MCNC: 30.3 MG/DL (ref 6–20)
BUPRENORPHINE UR QL: NORMAL
BZE UR QL SCN: ABNORMAL
CALCIUM SERPL-MCNC: 9.8 MG/DL (ref 8.8–10.4)
CANNABINOIDS UR QL SCN: ABNORMAL
CHLORIDE SERPL-SCNC: 103 MMOL/L (ref 98–107)
CREAT SERPL-MCNC: 1.63 MG/DL (ref 0.67–1.17)
CREAT UR-MCNC: 83.3 MG/DL
CREAT UR-MCNC: 84 MG/DL
EGFRCR SERPLBLD CKD-EPI 2021: 50 ML/MIN/1.73M2
ETHANOL UR QL SCN: NORMAL
FENTANYL UR QL: ABNORMAL
GLUCOSE SERPL-MCNC: 276 MG/DL (ref 70–99)
HCO3 SERPL-SCNC: 24 MMOL/L (ref 22–29)
OPIATES UR QL SCN: ABNORMAL
OXYCODONE UR QL: NORMAL
PCP QUAL URINE (ROCHE): ABNORMAL
POTASSIUM SERPL-SCNC: 5.1 MMOL/L (ref 3.4–5.3)
PROT SERPL-MCNC: 7.2 G/DL (ref 6.4–8.3)
SODIUM SERPL-SCNC: 136 MMOL/L (ref 135–145)

## 2024-12-01 PROCEDURE — 80307 DRUG TEST PRSMV CHEM ANLYZR: CPT | Mod: 59

## 2024-12-01 PROCEDURE — 80053 COMPREHEN METABOLIC PANEL: CPT

## 2024-12-01 PROCEDURE — 80307 DRUG TEST PRSMV CHEM ANLYZR: CPT

## 2024-12-01 PROCEDURE — 82248 BILIRUBIN DIRECT: CPT

## 2024-12-01 PROCEDURE — 36415 COLL VENOUS BLD VENIPUNCTURE: CPT

## 2024-12-01 PROCEDURE — 82570 ASSAY OF URINE CREATININE: CPT | Mod: 59

## 2024-12-02 ENCOUNTER — PATIENT OUTREACH (OUTPATIENT)
Dept: CARE COORDINATION | Facility: CLINIC | Age: 54
End: 2024-12-02
Payer: COMMERCIAL

## 2024-12-04 ENCOUNTER — PATIENT OUTREACH (OUTPATIENT)
Dept: CARE COORDINATION | Facility: CLINIC | Age: 54
End: 2024-12-04
Payer: COMMERCIAL

## 2024-12-04 LAB
CANNABINOIDS UR CFM-MCNC: 469 NG/ML
CARBOXYTHC/CREAT UR: 558 NG/MG CREAT

## 2024-12-08 ENCOUNTER — MYC REFILL (OUTPATIENT)
Dept: FAMILY MEDICINE | Facility: CLINIC | Age: 54
End: 2024-12-08

## 2024-12-08 ENCOUNTER — LAB (OUTPATIENT)
Dept: LAB | Facility: CLINIC | Age: 54
End: 2024-12-08
Payer: COMMERCIAL

## 2024-12-08 DIAGNOSIS — R79.89 ABNORMAL LIVER FUNCTION TESTS: ICD-10-CM

## 2024-12-08 DIAGNOSIS — K70.30 ALCOHOLIC CIRRHOSIS OF LIVER WITHOUT ASCITES (H): ICD-10-CM

## 2024-12-08 DIAGNOSIS — D70.9 NEUTROPENIA (H): ICD-10-CM

## 2024-12-08 DIAGNOSIS — K70.10 ALCOHOLIC HEPATITIS WITHOUT ASCITES (H): ICD-10-CM

## 2024-12-08 DIAGNOSIS — Z94.4 LIVER REPLACED BY TRANSPLANT (H): ICD-10-CM

## 2024-12-08 LAB
ALBUMIN SERPL BCG-MCNC: 4.1 G/DL (ref 3.5–5.2)
ALP SERPL-CCNC: 222 U/L (ref 40–150)
ALT SERPL W P-5'-P-CCNC: 68 U/L (ref 0–70)
ANION GAP SERPL CALCULATED.3IONS-SCNC: 9 MMOL/L (ref 7–15)
AST SERPL W P-5'-P-CCNC: 44 U/L (ref 0–45)
BASOPHILS NFR BLD AUTO: 1 %
BILIRUB DIRECT SERPL-MCNC: <0.2 MG/DL (ref 0–0.3)
BILIRUB SERPL-MCNC: 0.6 MG/DL
BUN SERPL-MCNC: 36.8 MG/DL (ref 6–20)
CALCIUM SERPL-MCNC: 10 MG/DL (ref 8.8–10.4)
CHLORIDE SERPL-SCNC: 104 MMOL/L (ref 98–107)
CREAT SERPL-MCNC: 1.67 MG/DL (ref 0.67–1.17)
EGFRCR SERPLBLD CKD-EPI 2021: 49 ML/MIN/1.73M2
EOSINOPHIL NFR BLD AUTO: 9 %
ERYTHROCYTE [DISTWIDTH] IN BLOOD BY AUTOMATED COUNT: 13.3 % (ref 10–15)
GLUCOSE SERPL-MCNC: 236 MG/DL (ref 70–99)
HCO3 SERPL-SCNC: 22 MMOL/L (ref 22–29)
HCT VFR BLD AUTO: 35.6 % (ref 40–53)
HGB BLD-MCNC: 12.4 G/DL (ref 13.3–17.7)
HOLD SPECIMEN: NORMAL
HOLD SPECIMEN: NORMAL
IMM GRANULOCYTES NFR BLD: 0 %
LYMPHOCYTES NFR BLD AUTO: 12 %
MAGNESIUM SERPL-MCNC: 1.8 MG/DL (ref 1.7–2.3)
MCH RBC QN AUTO: 29.7 PG (ref 26.5–33)
MCHC RBC AUTO-ENTMCNC: 34.8 G/DL (ref 31.5–36.5)
MCV RBC AUTO: 85 FL (ref 78–100)
MONOCYTES NFR BLD AUTO: 6 %
NEUTROPHILS NFR BLD AUTO: 73 %
PHOSPHATE SERPL-MCNC: 4.1 MG/DL (ref 2.5–4.5)
PLATELET # BLD AUTO: 248 10E3/UL (ref 150–450)
POTASSIUM SERPL-SCNC: 4.8 MMOL/L (ref 3.4–5.3)
PROT SERPL-MCNC: 7.2 G/DL (ref 6.4–8.3)
RBC # BLD AUTO: 4.17 10E6/UL (ref 4.4–5.9)
SODIUM SERPL-SCNC: 135 MMOL/L (ref 135–145)
WBC # BLD AUTO: 9.3 10E3/UL (ref 4–11)

## 2024-12-08 PROCEDURE — 84100 ASSAY OF PHOSPHORUS: CPT

## 2024-12-08 PROCEDURE — 85025 COMPLETE CBC W/AUTO DIFF WBC: CPT

## 2024-12-08 PROCEDURE — 80158 DRUG ASSAY CYCLOSPORINE: CPT

## 2024-12-08 PROCEDURE — 83735 ASSAY OF MAGNESIUM: CPT

## 2024-12-08 PROCEDURE — 82248 BILIRUBIN DIRECT: CPT

## 2024-12-08 PROCEDURE — 80053 COMPREHEN METABOLIC PANEL: CPT

## 2024-12-08 PROCEDURE — 36415 COLL VENOUS BLD VENIPUNCTURE: CPT

## 2024-12-09 ENCOUNTER — OFFICE VISIT (OUTPATIENT)
Dept: ORTHOPEDICS | Facility: CLINIC | Age: 54
End: 2024-12-09
Payer: COMMERCIAL

## 2024-12-09 DIAGNOSIS — M17.0 PRIMARY OSTEOARTHRITIS OF BOTH KNEES: Primary | ICD-10-CM

## 2024-12-09 LAB
CYCLOSPORINE BLD LC/MS/MS-MCNC: 107 UG/L (ref 50–400)
TME LAST DOSE: NORMAL H
TME LAST DOSE: NORMAL H

## 2024-12-09 PROCEDURE — 99214 OFFICE O/P EST MOD 30 MIN: CPT | Performed by: FAMILY MEDICINE

## 2024-12-09 RX ORDER — MULTIPLE VITAMINS W/ MINERALS TAB 9MG-400MCG
1 TAB ORAL DAILY
Qty: 90 TABLET | Refills: 3 | Status: SHIPPED | OUTPATIENT
Start: 2024-12-09

## 2024-12-09 NOTE — PROGRESS NOTES
[facility]  SPORTS & ORTHOPEDIC CLINIC VISIT     Dec 9, 2024        ASSESSMENT & PLAN    54-year-old with chronic episodes of right knee pain and occasional instability.  Does have some mild DJD in the medial compartment and suspect that he may have meniscal injury given his description of symptoms and exam.    Reviewed imaging and assessment with patient in detail  We discussed further evaluation with MRI versus continued symptomatic management.  May consider steroid injection.  He was prescribed a topical anti-inflammatory, diclofenac, to be used on a as needed basis.  Will defer imaging and injection for now.  Contact us in the future if he like to pursue this option.    Nithin Souza MD  [facility] SPORTS MEDIC[facility]    -----  Chief Complaint   Patient presents with    Right Knee - Pain       SUBJECTIVE  Mary Lopez is a/an 54 year old male who is seen as a self referral for evaluation of  right knee pain.     The patient is seen with their wife.  The patient is Right handed    Onset: 3 month(s) ago. Reports insidious onset without acute precipitating event.  Location of Pain: right anterior knee   Worsened by: Walking, squatting, bending, sitting to standing   Better with: No  Treatments tried: ice, ibuprofen, and Aleve in the past. Has previously done physical therapy. Has not done injections.   Associated symptoms: feeling of instability when its bad     Orthopedic/Surgical history: NO  Social History/Occupation: Not working       REVIEW OF SYSTEMS:  Do you have fever, chills, weight loss? No  Do you have any vision problems? No  Do you have any chest pain or edema? No  Do you have any shortness of breath or wheezing?  No  Do you have stomach problems? No  Do you have any numbness or focal weakness? No  Do you have diabetes? Yes, type 2   Do you have problems with bleeding or clotting? No  Do you have an rashes or other skin lesions?  No    OBJECTIVE:  There were no vitals taken for this visit.     Patient is alert, No acute distress, pleasant and conversational.    Gait: nonantalgic. Normal heel toe gait.    right knee:   Skin intact. No erythema or ecchymosis.  No effusion or soft tissue swelling.    AROM: Zero to approximately 135  with pain on terminal flexion    Palpation: No medial or lateral facet joint tenderness.  TTP posterior medial joint line.    Special Tests:  Positive Kourtney's  No ligamentous laxity or pain with valgus or varus stress.  Negative Lachman's, Anterior Drawer and Posterior Drawer     Full Isometric quad strength, extensor mechanism in place     Neurovascularly intact in the lower extremity    Hip and Ankle with full AROM and nontender      RADIOLOGY:    3 view xrays of right knee performed 10/28/2024 and reviewed independently demonstrating no acute findings.  Mild DJD in the medial compartment. See EMR for formal radiology report.

## 2024-12-09 NOTE — LETTER
12/9/2024      RE: Mary Lopez  [address]  [place] [address]     Dear Colleague,    Thank you for referring your patient, Mary Lopez, to the [facility] SPORTS MEDIC[facility]. Please see a copy of my visit note below.      [facility]  SPORTS & ORTHOPEDIC CLINIC VISIT     Dec 9, 2024        ASSESSMENT & PLAN    54-year-old with chronic episodes of right knee pain and occasional instability.  Does have some mild DJD in the medial compartment and suspect that he may have meniscal injury given his description of symptoms and exam.    Reviewed imaging and assessment with patient in detail  We discussed further evaluation with MRI versus continued symptomatic management.  May consider steroid injection.  He was prescribed a topical anti-inflammatory, diclofenac, to be used on a as needed basis.  Will defer imaging and injection for now.  Contact us in the future if he like to pursue this option.    Nithin Souza MD  [facility]TS MEDIC[facility]    -----  Chief Complaint   Patient presents with     Right Knee - Pain       SUBJECTIVE  Mary Lopez is a/an 54 year old male who is seen as a self referral for evaluation of  right knee pain.     The patient is seen with their wife.  The patient is Right handed    Onset: 3 month(s) ago. Reports insidious onset without acute precipitating event.  Location of Pain: right anterior knee   Worsened by: Walking, squatting, bending, sitting to standing   Better with: No  Treatments tried: ice, ibuprofen, and Aleve in the past. Has previously done physical therapy. Has not done injections.   Associated symptoms: feeling of instability when its bad     Orthopedic/Surgical history: NO  Social History/Occupation: Not working       REVIEW OF SYSTEMS:  Do you have fever, chills, weight loss? No  Do you have any vision problems? No  Do you have any chest pain or edema? No  Do you  have any shortness of breath or wheezing?  No  Do you have stomach problems? No  Do you have any numbness or focal weakness? No  Do you have diabetes? Yes, type 2   Do you have problems with bleeding or clotting? No  Do you have an rashes or other skin lesions? No    OBJECTIVE:  There were no vitals taken for this visit.     Patient is alert, No acute distress, pleasant and conversational.    Gait: nonantalgic. Normal heel toe gait.    right knee:   Skin intact. No erythema or ecchymosis.  No effusion or soft tissue swelling.    AROM: Zero to approximately 135  with pain on terminal flexion    Palpation: No medial or lateral facet joint tenderness.  TTP posterior medial joint line.    Special Tests:  Positive Kourtney's  No ligamentous laxity or pain with valgus or varus stress.  Negative Lachman's, Anterior Drawer and Posterior Drawer     Full Isometric quad strength, extensor mechanism in place     Neurovascularly intact in the lower extremity    Hip and Ankle with full AROM and nontender      RADIOLOGY:    3 view xrays of right knee performed 10/28/2024 and reviewed independently demonstrating no acute findings.  Mild DJD in the medial compartment. See EMR for formal radiology report.              Again, thank you for allowing me to participate in the care of your patient.      Sincerely,    Nithin Souza MD

## 2024-12-10 ENCOUNTER — TELEPHONE (OUTPATIENT)
Dept: TRANSPLANT | Facility: CLINIC | Age: 54
End: 2024-12-10
Payer: COMMERCIAL

## 2024-12-10 NOTE — TELEPHONE ENCOUNTER
My question to Dr. Leventhal:    ERCP 10/22, mild anast str, fall out stent placed by MF.  Alk phos improved but ALT up - just repeat liver tests in a week?   mIld to mod rej March of 2024.  CSA and 180 mmf bid.         Message from Dr. Leventhal:    Leventhal, Thomas Michael, MD sent to Lore Mckeon RN  Keep IS same.  Repeat in 1-2 weeks

## 2024-12-15 ENCOUNTER — LAB (OUTPATIENT)
Dept: LAB | Facility: CLINIC | Age: 54
End: 2024-12-15
Payer: COMMERCIAL

## 2024-12-15 DIAGNOSIS — Z94.4 LIVER REPLACED BY TRANSPLANT (H): Chronic | ICD-10-CM

## 2024-12-15 LAB
ALBUMIN SERPL BCG-MCNC: 3.9 G/DL (ref 3.5–5.2)
ALP SERPL-CCNC: 235 U/L (ref 40–150)
ALT SERPL W P-5'-P-CCNC: 33 U/L (ref 0–70)
AST SERPL W P-5'-P-CCNC: 31 U/L (ref 0–45)
BILIRUB DIRECT SERPL-MCNC: <0.2 MG/DL (ref 0–0.3)
BILIRUB SERPL-MCNC: 0.5 MG/DL
PROT SERPL-MCNC: 7.2 G/DL (ref 6.4–8.3)

## 2024-12-15 PROCEDURE — 36415 COLL VENOUS BLD VENIPUNCTURE: CPT

## 2024-12-15 PROCEDURE — 80076 HEPATIC FUNCTION PANEL: CPT

## 2024-12-16 ENCOUNTER — TELEPHONE (OUTPATIENT)
Dept: TRANSPLANT | Facility: CLINIC | Age: 54
End: 2024-12-16
Payer: COMMERCIAL

## 2024-12-17 ENCOUNTER — ANESTHESIA EVENT (OUTPATIENT)
Dept: SURGERY | Facility: CLINIC | Age: 54
End: 2024-12-17
Payer: COMMERCIAL

## 2024-12-17 ENCOUNTER — APPOINTMENT (OUTPATIENT)
Dept: GENERAL RADIOLOGY | Facility: CLINIC | Age: 54
End: 2024-12-17
Attending: INTERNAL MEDICINE
Payer: COMMERCIAL

## 2024-12-17 ENCOUNTER — HOSPITAL ENCOUNTER (OUTPATIENT)
Facility: CLINIC | Age: 54
Discharge: HOME | End: 2024-12-17
Attending: INTERNAL MEDICINE | Admitting: INTERNAL MEDICINE
Payer: COMMERCIAL

## 2024-12-17 ENCOUNTER — ANESTHESIA (OUTPATIENT)
Dept: SURGERY | Facility: CLINIC | Age: 54
End: 2024-12-17
Payer: COMMERCIAL

## 2024-12-17 VITALS
HEART RATE: 59 BPM | SYSTOLIC BLOOD PRESSURE: 113 MMHG | RESPIRATION RATE: 14 BRPM | BODY MASS INDEX: 31.92 KG/M2 | DIASTOLIC BLOOD PRESSURE: 74 MMHG | WEIGHT: 223 LBS | HEIGHT: 70 IN | TEMPERATURE: 97.5 F | OXYGEN SATURATION: 99 %

## 2024-12-17 LAB
ERCP: NORMAL
GLUCOSE BLDC GLUCOMTR-MCNC: 215 MG/DL (ref 70–99)

## 2024-12-17 PROCEDURE — 250N000025 HC SEVOFLURANE, PER MIN: Performed by: INTERNAL MEDICINE

## 2024-12-17 PROCEDURE — 250N000009 HC RX 250: Performed by: NURSE ANESTHETIST, CERTIFIED REGISTERED

## 2024-12-17 PROCEDURE — 710N000010 HC RECOVERY PHASE 1, LEVEL 2, PER MIN: Performed by: INTERNAL MEDICINE

## 2024-12-17 PROCEDURE — 82962 GLUCOSE BLOOD TEST: CPT

## 2024-12-17 PROCEDURE — C1769 GUIDE WIRE: HCPCS | Performed by: INTERNAL MEDICINE

## 2024-12-17 PROCEDURE — 370N000017 HC ANESTHESIA TECHNICAL FEE, PER MIN: Performed by: INTERNAL MEDICINE

## 2024-12-17 PROCEDURE — 255N000002 HC RX 255 OP 636: Performed by: INTERNAL MEDICINE

## 2024-12-17 PROCEDURE — 250N000011 HC RX IP 250 OP 636: Performed by: NURSE ANESTHETIST, CERTIFIED REGISTERED

## 2024-12-17 PROCEDURE — 999N000141 HC STATISTIC PRE-PROCEDURE NURSING ASSESSMENT: Performed by: INTERNAL MEDICINE

## 2024-12-17 PROCEDURE — C2617 STENT, NON-COR, TEM W/O DEL: HCPCS | Performed by: INTERNAL MEDICINE

## 2024-12-17 PROCEDURE — 250N000009 HC RX 250: Performed by: INTERNAL MEDICINE

## 2024-12-17 PROCEDURE — 710N000012 HC RECOVERY PHASE 2, PER MINUTE: Performed by: INTERNAL MEDICINE

## 2024-12-17 PROCEDURE — 250N000011 HC RX IP 250 OP 636: Performed by: ANESTHESIOLOGY

## 2024-12-17 PROCEDURE — 258N000003 HC RX IP 258 OP 636: Performed by: NURSE ANESTHETIST, CERTIFIED REGISTERED

## 2024-12-17 PROCEDURE — 74330 X-RAY BILE/PANC ENDOSCOPY: CPT | Mod: TC

## 2024-12-17 PROCEDURE — 272N000001 HC OR GENERAL SUPPLY STERILE: Performed by: INTERNAL MEDICINE

## 2024-12-17 PROCEDURE — C1726 CATH, BAL DIL, NON-VASCULAR: HCPCS | Performed by: INTERNAL MEDICINE

## 2024-12-17 PROCEDURE — 360N000083 HC SURGERY LEVEL 3 W/ FLUORO, PER MIN: Performed by: INTERNAL MEDICINE

## 2024-12-17 RX ORDER — SODIUM CHLORIDE, SODIUM LACTATE, POTASSIUM CHLORIDE, CALCIUM CHLORIDE 600; 310; 30; 20 MG/100ML; MG/100ML; MG/100ML; MG/100ML
INJECTION, SOLUTION INTRAVENOUS CONTINUOUS
Status: DISCONTINUED | OUTPATIENT
Start: 2024-12-17 | End: 2024-12-17 | Stop reason: HOSPADM

## 2024-12-17 RX ORDER — NALOXONE HYDROCHLORIDE 0.4 MG/ML
0.1 INJECTION, SOLUTION INTRAMUSCULAR; INTRAVENOUS; SUBCUTANEOUS
Status: DISCONTINUED | OUTPATIENT
Start: 2024-12-17 | End: 2024-12-17 | Stop reason: HOSPADM

## 2024-12-17 RX ORDER — FENTANYL CITRATE 50 UG/ML
INJECTION, SOLUTION INTRAMUSCULAR; INTRAVENOUS PRN
Status: DISCONTINUED | OUTPATIENT
Start: 2024-12-17 | End: 2024-12-17

## 2024-12-17 RX ORDER — OXYCODONE HYDROCHLORIDE 5 MG/1
5 TABLET ORAL
Status: DISCONTINUED | OUTPATIENT
Start: 2024-12-17 | End: 2024-12-17 | Stop reason: HOSPADM

## 2024-12-17 RX ORDER — ONDANSETRON 2 MG/ML
4 INJECTION INTRAMUSCULAR; INTRAVENOUS EVERY 30 MIN PRN
Status: DISCONTINUED | OUTPATIENT
Start: 2024-12-17 | End: 2024-12-17 | Stop reason: HOSPADM

## 2024-12-17 RX ORDER — DEXAMETHASONE SODIUM PHOSPHATE 4 MG/ML
4 INJECTION, SOLUTION INTRA-ARTICULAR; INTRALESIONAL; INTRAMUSCULAR; INTRAVENOUS; SOFT TISSUE
Status: DISCONTINUED | OUTPATIENT
Start: 2024-12-17 | End: 2024-12-17 | Stop reason: HOSPADM

## 2024-12-17 RX ORDER — ONDANSETRON 4 MG/1
4 TABLET, ORALLY DISINTEGRATING ORAL EVERY 30 MIN PRN
Status: DISCONTINUED | OUTPATIENT
Start: 2024-12-17 | End: 2024-12-17 | Stop reason: HOSPADM

## 2024-12-17 RX ORDER — ONDANSETRON 2 MG/ML
INJECTION INTRAMUSCULAR; INTRAVENOUS PRN
Status: DISCONTINUED | OUTPATIENT
Start: 2024-12-17 | End: 2024-12-17

## 2024-12-17 RX ORDER — HYDROMORPHONE HCL IN WATER/PF 6 MG/30 ML
0.2 PATIENT CONTROLLED ANALGESIA SYRINGE INTRAVENOUS EVERY 5 MIN PRN
Status: DISCONTINUED | OUTPATIENT
Start: 2024-12-17 | End: 2024-12-17 | Stop reason: HOSPADM

## 2024-12-17 RX ORDER — OXYCODONE HYDROCHLORIDE 5 MG/1
10 TABLET ORAL
Status: DISCONTINUED | OUTPATIENT
Start: 2024-12-17 | End: 2024-12-17 | Stop reason: HOSPADM

## 2024-12-17 RX ORDER — PROPOFOL 10 MG/ML
INJECTION, EMULSION INTRAVENOUS PRN
Status: DISCONTINUED | OUTPATIENT
Start: 2024-12-17 | End: 2024-12-17

## 2024-12-17 RX ORDER — FENTANYL CITRATE 50 UG/ML
50 INJECTION, SOLUTION INTRAMUSCULAR; INTRAVENOUS EVERY 5 MIN PRN
Status: DISCONTINUED | OUTPATIENT
Start: 2024-12-17 | End: 2024-12-17 | Stop reason: HOSPADM

## 2024-12-17 RX ORDER — DEXAMETHASONE SODIUM PHOSPHATE 4 MG/ML
INJECTION, SOLUTION INTRA-ARTICULAR; INTRALESIONAL; INTRAMUSCULAR; INTRAVENOUS; SOFT TISSUE PRN
Status: DISCONTINUED | OUTPATIENT
Start: 2024-12-17 | End: 2024-12-17

## 2024-12-17 RX ORDER — SODIUM CHLORIDE, SODIUM LACTATE, POTASSIUM CHLORIDE, CALCIUM CHLORIDE 600; 310; 30; 20 MG/100ML; MG/100ML; MG/100ML; MG/100ML
INJECTION, SOLUTION INTRAVENOUS CONTINUOUS PRN
Status: DISCONTINUED | OUTPATIENT
Start: 2024-12-17 | End: 2024-12-17

## 2024-12-17 RX ORDER — MAGNESIUM HYDROXIDE 1200 MG/15ML
LIQUID ORAL PRN
Status: DISCONTINUED | OUTPATIENT
Start: 2024-12-17 | End: 2024-12-17 | Stop reason: HOSPADM

## 2024-12-17 RX ORDER — LIDOCAINE HYDROCHLORIDE 20 MG/ML
INJECTION, SOLUTION INFILTRATION; PERINEURAL PRN
Status: DISCONTINUED | OUTPATIENT
Start: 2024-12-17 | End: 2024-12-17

## 2024-12-17 RX ORDER — FENTANYL CITRATE 50 UG/ML
25 INJECTION, SOLUTION INTRAMUSCULAR; INTRAVENOUS EVERY 5 MIN PRN
Status: DISCONTINUED | OUTPATIENT
Start: 2024-12-17 | End: 2024-12-17 | Stop reason: HOSPADM

## 2024-12-17 RX ORDER — HYDROMORPHONE HCL IN WATER/PF 6 MG/30 ML
0.4 PATIENT CONTROLLED ANALGESIA SYRINGE INTRAVENOUS EVERY 5 MIN PRN
Status: DISCONTINUED | OUTPATIENT
Start: 2024-12-17 | End: 2024-12-17 | Stop reason: HOSPADM

## 2024-12-17 RX ORDER — CIPROFLOXACIN 2 MG/ML
INJECTION, SOLUTION INTRAVENOUS PRN
Status: DISCONTINUED | OUTPATIENT
Start: 2024-12-17 | End: 2024-12-17

## 2024-12-17 RX ORDER — QUETIAPINE FUMARATE 25 MG/1
25 TABLET, FILM COATED ORAL AT BEDTIME
COMMUNITY

## 2024-12-17 RX ADMIN — SODIUM CHLORIDE, POTASSIUM CHLORIDE, SODIUM LACTATE AND CALCIUM CHLORIDE: 600; 310; 30; 20 INJECTION, SOLUTION INTRAVENOUS at 09:37

## 2024-12-17 RX ADMIN — PROPOFOL 200 MG: 10 INJECTION, EMULSION INTRAVENOUS at 09:48

## 2024-12-17 RX ADMIN — FENTANYL CITRATE 100 MCG: 50 INJECTION INTRAMUSCULAR; INTRAVENOUS at 09:48

## 2024-12-17 RX ADMIN — LIDOCAINE HYDROCHLORIDE 100 MG: 20 INJECTION, SOLUTION INFILTRATION; PERINEURAL at 09:48

## 2024-12-17 RX ADMIN — ROCURONIUM BROMIDE 50 MG: 50 INJECTION, SOLUTION INTRAVENOUS at 09:48

## 2024-12-17 RX ADMIN — ONDANSETRON 4 MG: 2 INJECTION INTRAMUSCULAR; INTRAVENOUS at 09:58

## 2024-12-17 RX ADMIN — PHENYLEPHRINE HYDROCHLORIDE 100 MCG: 10 INJECTION INTRAVENOUS at 10:13

## 2024-12-17 RX ADMIN — DEXAMETHASONE SODIUM PHOSPHATE 4 MG: 4 INJECTION, SOLUTION INTRA-ARTICULAR; INTRALESIONAL; INTRAMUSCULAR; INTRAVENOUS; SOFT TISSUE at 09:58

## 2024-12-17 RX ADMIN — MIDAZOLAM 2 MG: 1 INJECTION INTRAMUSCULAR; INTRAVENOUS at 09:37

## 2024-12-17 RX ADMIN — PHENYLEPHRINE HYDROCHLORIDE 100 MCG: 10 INJECTION INTRAVENOUS at 10:03

## 2024-12-17 RX ADMIN — Medication 200 MG: at 10:28

## 2024-12-17 RX ADMIN — ONDANSETRON 4 MG: 2 INJECTION, SOLUTION INTRAMUSCULAR; INTRAVENOUS at 11:14

## 2024-12-17 RX ADMIN — CIPROFLOXACIN 400 MG: 400 INJECTION, SOLUTION INTRAVENOUS at 09:37

## 2024-12-17 RX ADMIN — PHENYLEPHRINE HYDROCHLORIDE 100 MCG: 10 INJECTION INTRAVENOUS at 09:58

## 2024-12-17 ASSESSMENT — ACTIVITIES OF DAILY LIVING (ADL)
ADLS_ACUITY_SCORE: 59

## 2024-12-17 ASSESSMENT — ENCOUNTER SYMPTOMS: SEIZURES: 0

## 2024-12-17 ASSESSMENT — LIFESTYLE VARIABLES: TOBACCO_USE: 0

## 2024-12-17 NOTE — ANESTHESIA CARE TRANSFER NOTE
Patient: Mary Lopez    Procedure: Procedure(s):  ENDOSCOPIC RETROGRADE CHOLANGIOPANCREATOGRAPHY BILLIARY DILATION AND STENT PLACEMENT       Diagnosis: Elevated LFTs [R79.89]  Diagnosis Additional Information: No value filed.    Anesthesia Type:   General     Note:    Oropharynx: oropharynx clear of all foreign objects and spontaneously breathing  Level of Consciousness: drowsy  Oxygen Supplementation: face mask  Level of Supplemental Oxygen (L/min / FiO2): 6  Independent Airway: airway patency satisfactory and stable  Dentition: dentition unchanged  Vital Signs Stable: post-procedure vital signs reviewed and stable  Report to RN Given: handoff report given  Patient transferred to: PACU  Comments: Neuromuscular blockade reversed with sugammadex, spontaneous respirations, adequate tidal volumes, followed commands to voice, oropharynx suctioned with soft flexible catheter, extubated atraumatically, extubated with suction, airway patent after extubation.  Oxygen via facemask at 6 liters per minute to PACU. Oxygen tubing connected to wall O2 in PACU, SpO2, NiBP, and EKG monitors and alarms on and functioning, report on patient's clinical status given to PACU RN, RN questions answered.         Handoff Report: Identifed the Patient, Identified the Reponsible Provider, Reviewed the pertinent medical history, Discussed the surgical course, Reviewed Intra-OP anesthesia mangement and issues during anesthesia, Set expectations for post-procedure period and Allowed opportunity for questions and acknowledgement of understanding      Vitals:  Vitals Value Taken Time   BP     Temp     Pulse     Resp     SpO2         Electronically Signed By: LESA Dawkins CRNA  December 17, 2024  10:40 AM

## 2024-12-17 NOTE — OR NURSING
Dr Hernandez aware of . No new orders.  Also aware pt had chew in mouth and spit out at 0820 in preop

## 2024-12-17 NOTE — ANESTHESIA POSTPROCEDURE EVALUATION
Patient: Mary Lopez    Procedure: Procedure(s):  ENDOSCOPIC RETROGRADE CHOLANGIOPANCREATOGRAPHY BILLIARY DILATION AND STENT PLACEMENT       Anesthesia Type:  General    Note:  Disposition: Outpatient   Postop Pain Control: Uneventful            Sign Out: Well controlled pain   PONV: No   Neuro/Psych: Uneventful            Sign Out: Acceptable/Baseline neuro status   Airway/Respiratory: Uneventful            Sign Out: Acceptable/Baseline resp. status   CV/Hemodynamics: Uneventful            Sign Out: Acceptable CV status; No obvious hypovolemia; No obvious fluid overload   Other NRE: NONE   DID A NON-ROUTINE EVENT OCCUR? No           Last vitals:  Vitals Value Taken Time   /72 12/17/24 1145   Temp 36.3  C (97.4  F) 12/17/24 1145   Pulse 60 12/17/24 1128   Resp 16 12/17/24 1145   SpO2 99 % 12/17/24 1130   Vitals shown include unfiled device data.    Electronically Signed By: Hugo Hernandez MD  December 17, 2024  12:57 PM

## 2024-12-17 NOTE — ANESTHESIA PROCEDURE NOTES
Airway       Patient location: [facility] - Operating Room or Procedural Area.       Procedure Start/Stop Times: 12/17/2024 9:50 AM  Staff -        Anesthesiologist:  Hugo Hernandez MD       CRNA: Alecia High APRN CRNA       Performed By: CRNAIndications and Patient Condition       Indications for airway management: doreen-procedural and airway protection       Induction type:intravenous      Final Airway Details       Final airway type: endotracheal airway       Successful airway: ETT - single  Endotracheal Airway Details        ETT size (mm): 8.0       Cuffed: yes       Successful intubation technique: video laryngoscopy       VL Blade Size: Sweeney 4       Grade View of Cords: 1       Adjucts: stylet       Position: Right       Measured from: gums/teeth       Secured at (cm): 22       Bite block used: None    Post intubation assessment        Placement verified by: capnometry, equal breath sounds and chest rise        Number of attempts at approach: 1       Number of other approaches attempted: 0       Secured with: tape       Ease of procedure: easy       Dentition: Intact and Unchanged    Medication(s) Administered   Medication Administration Time: 12/17/2024 9:50 AM

## 2024-12-17 NOTE — DISCHARGE INSTRUCTIONS
Same Day Surgery Discharge Instructions for  Sedation and General Anesthesia     It's not unusual to feel dizzy, light-headed or faint for up to 24 hours after surgery or while taking pain medication.  If you have these symptoms: sit for a few minutes before standing and have someone assist you when you get up to walk or use the bathroom.    You should rest and relax for the next 24 hours. We recommend you make arrangements to have an adult stay with you for at least 24 hours after your discharge.  Avoid hazardous and strenuous activity.    DO NOT DRIVE any vehicle or operate mechanical equipment for 24 hours following the end of your surgery.  Even though you may feel normal, your reactions may be affected by the medication you have received.    Do not drink alcoholic beverages for 24 hours following surgery.     Slowly progress to your regular diet as you feel able. It's not unusual to feel nauseated and/or vomit after receiving anesthesia.  If you develop these symptoms, drink clear liquids (apple juice, ginger ale, broth, 7-up, etc. ) until you feel better.  If your nausea and vomiting persists for 24 hours, please notify your surgeon.      All narcotic pain medications, along with inactivity and anesthesia, can cause constipation. Drinking plenty of liquids and increasing fiber intake will help.    For any questions of a medical nature, call your surgeon.    Do not make important decisions for 24 hours.    If you had general anesthesia, you may have a sore throat for a couple of days related to the breathing tube used during surgery.  You may use Cepacol lozenges to help with this discomfort.  If it worsens or if you develop a fever, contact your surgeon.     If you feel your pain is not well managed with the pain medications prescribed by your surgeon, please contact your surgeon's office to let them know so they can address your concerns.     **If you have questions or concerns about your procedure,   call  Dr. Martínez at

## 2024-12-17 NOTE — ANESTHESIA PREPROCEDURE EVALUATION
Anesthesia Pre-Procedure Evaluation    Patient: Mary Lopez   MRN: [id number] : 1970        Procedure : Procedure(s):  ENDOSCOPIC RETROGRADE CHOLANGIOPANCREATOGRAPHY          Past Medical History:   Diagnosis Date    ANGEL (acute kidney injury) (H)     Alcoholic cirrhosis of liver without ascites (H) 2023    Alcoholic hepatitis with ascites (H) 10/03/2023    Alcoholic hepatitis without ascites (H) 2023    CKD stage 3a, GFR 45-59 ml/min (H) 2024    Closed fracture of one rib of left side 2023    Concussion without loss of consciousness 2020    Decompensated hepatic cirrhosis (H) 09/15/2023    Diabetes mellitus, type 2 (H) 2023    Essential hypertension 2020    Ganglion cyst of wrist, right 2024    History of biliary duct stent placement 2024    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)     Liver replaced by transplant (H) 2024    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    ACR PRAJAPATI 6-7/, treated with steroids    Mild hyperlipidemia 2021    Persistent insomnia 2023    Portal hypertension (H) 2023    Right inguinal hernia 2024    Scrotal abscess     Secondary esophageal 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 (H) 2023    Tobacco abuse disorder 2020    Type 2 diabetes mellitus with hyperglycemia (H) 2023      Past Surgical History:   Procedure Laterality Date    BENCH LIVER  3/5/2024    Procedure: Bench liver;  Surgeon: Ryder Cortez MD;  Location: [facility]    CHOLECYSTECTOMY      COLONOSCOPY N/A 2024    Procedure: COLONOSCOPY, WITH POLYPECTOMY;  Surgeon: Jak Urbina MD;  Location: [facility]    CV RIGHT HEART CATH MEASUREMENTS RECORDED N/A 2024    Procedure: Right Heart Catheterization;  Surgeon: Alfred Tafoya MD;  Location:  HEART CARDIAC CATH LAB    ENDOSCOPIC RETROGRADE CHOLANGIOPANCREATOGRAM N/A 10/22/2024    Procedure: ENDOSCOPIC RETROGRADE CHOLANGIOPANCREATOGRAPHY, WITH  bile duct stent stent exchange and balloon dilation;  Surgeon: Naldo Rodriguez MD;  Location: [facility] OR    ENDOSCOPIC RETROGRADE CHOLANGIOPANCREATOGRAPHY, EXCHANGE TUBE/STENT N/A 2024    Procedure: Endoscopic Retrograde Cholangiopancreatography with biliary sphincterotomy, balloon dilation, pancreatic stent placement,biliary stent exchange;  Surgeon: Naldo Rodriguez MD;  Location: [facility]    ENTEROSCOPY SMALL BOWEL N/A 2024    Procedure: Enteroscopy small bowel;  Surgeon: Hugo Daigle MD;  Location: [facility]    ESOPHAGOSCOPY, GASTROSCOPY, DUODENOSCOPY (EGD), COMBINED N/A 2024    Procedure: Esophagoscopy, gastroscopy, duodenoscopy (EGD), combined;  Surgeon: Hugo Daigle MD;  Location: [facility]    HERNIORRHAPHY INGUINAL Right 2024    Procedure: Open Inguinal Hernia Repair;  Surgeon: Ryder Cortez MD;  Location: [facility] OR    IR LIVER BIOPSY PERCUTANEOUS  3/15/2024    IR RETROPERITONEAL ABSCESS DRAINAGE  2024    TONSILLECTOMY      TRANSPLANT LIVER RECIPIENT  DONOR N/A 3/5/2024    Procedure: Transplant liver recipient  donor, bile duct stent placement;  Surgeon: Ryder Cortez MD;  Location: [facility]    VASECTOMY        Allergies   Allergen Reactions    Other Food Allergy Anaphylaxis     Legumes (black beans, baked beans, chickpeas)    Pineapple Itching      Social History     Tobacco Use    Smoking status: Former     Types: Cigarettes     Passive exposure: Never    Smokeless tobacco: Current     Types: Chew     Last attempt to quit: 2004    Tobacco comments:     Chew daily 1/8 of a tin per day   Substance Use Topics    Alcohol use: Not Currently     Alcohol/week: 12.0 standard drinks of alcohol     Types: 12 Standard drinks or equivalent per week     Comment: Sober since 2023      Wt Readings from Last 1 Encounters:   24 101.2 kg (223 lb)        Anesthesia Evaluation   Pt has had prior anesthetic.     No history of anesthetic  "complications       ROS/MED HX  ENT/Pulmonary:    (-) tobacco use and sleep apnea   Neurologic:     (+)      migraines,                       (-) no seizures and no CVA   Cardiovascular:     (+) Dyslipidemia hypertension- -   -  - -                                      METS/Exercise Tolerance:     Hematologic:       Musculoskeletal:       GI/Hepatic:     (+)           hepatitis type Alcoholic, liver disease (hx [place]),    (-) GERD   Renal/Genitourinary:     (+) renal disease, type: CRI,            Endo:     (+)  type II DM,             Obesity,    (-) thyroid disease   Psychiatric/Substance Use:     (+)   alcohol abuse      Infectious Disease:       Malignancy:       Other:            Physical Exam    Airway        Mallampati: II   TM distance: > 3 FB   Neck ROM: full   Mouth opening: > 3 cm    Respiratory Devices and Support         Dental       (+) Minor Abnormalities - some fillings, tiny chips      Cardiovascular   cardiovascular exam normal          Pulmonary   pulmonary exam normal                OUTSIDE LABS:  CBC:   Lab Results   Component Value Date    WBC 9.3 12/08/2024    WBC 6.4 11/25/2024    HGB 12.4 (L) 12/08/2024    HGB 12.3 (L) 11/25/2024    HCT 35.6 (L) 12/08/2024    HCT 36.6 (L) 11/25/2024     12/08/2024     11/25/2024     BMP:   Lab Results   Component Value Date     12/08/2024     12/01/2024    POTASSIUM 4.8 12/08/2024    POTASSIUM 5.1 12/01/2024    CHLORIDE 104 12/08/2024    CHLORIDE 103 12/01/2024    CO2 22 12/08/2024    CO2 24 12/01/2024    BUN 36.8 (H) 12/08/2024    BUN 30.3 (H) 12/01/2024    CR 1.67 (H) 12/08/2024    CR 1.63 (H) 12/01/2024     (H) 12/17/2024     (H) 12/08/2024     COAGS:   Lab Results   Component Value Date    PTT 33 03/31/2024    INR 1.04 10/22/2024    FIBR 310 03/31/2024     POC: No results found for: \"BGM\", \"HCG\", \"HCGS\"  HEPATIC:   Lab Results   Component Value Date    ALBUMIN 3.9 12/15/2024    PROTTOTAL 7.2 12/15/2024    ALT 33 " "12/15/2024    AST 31 12/15/2024    GGT 71 (H) 09/30/2024    ALKPHOS 235 (H) 12/15/2024    BILITOTAL 0.5 12/15/2024    JAQUELINE 27 03/09/2024     OTHER:   Lab Results   Component Value Date    PH 7.45 03/07/2024    LACT 0.8 03/30/2024    A1C 9.1 (H) 10/28/2024    MEG 10.0 12/08/2024    PHOS 4.1 12/08/2024    MAG 1.8 12/08/2024    LIPASE 9 (L) 10/22/2024    AMYLASE 34 08/13/2024    TSH 1.07 11/25/2024    SED 20 11/25/2024       Anesthesia Plan    ASA Status:  3    NPO Status:  NPO Appropriate    Anesthesia Type: General.     - Airway: ETT   Induction: Intravenous.   Maintenance: Balanced.   Techniques and Equipment:     - Airway: Video-Laryngoscope       Consents    Anesthesia Plan(s) and associated risks, benefits, and realistic alternatives discussed. Questions answered and patient/representative(s) expressed understanding.     - Discussed:     - Discussed with:  Patient            Postoperative Care    Pain management: Multi-modal analgesia.   PONV prophylaxis: Ondansetron (or other 5HT-3), Dexamethasone or Solumedrol, Background Propofol Infusion     Comments:               Hugo Hernandez MD    I have reviewed the pertinent notes and labs in the chart from the past 30 days and (re)examined the patient.  Any updates or changes from those notes are reflected in this note.               # Hypertension: Home medication list includes antihypertensive(s)          # DMII: A1C = 9.1 % (Ref range: 0.0 - 5.6 %) within past 6 months    # Obesity: Estimated body mass index is 32 kg/m  as calculated from the following:    Height as of this encounter: 1.778 m (5' 10\").    Weight as of this encounter: 101.2 kg (223 lb).       # Financial/Environmental Concerns:          "

## 2024-12-18 ENCOUNTER — TELEPHONE (OUTPATIENT)
Dept: ENDOCRINOLOGY | Facility: CLINIC | Age: 54
End: 2024-12-18
Payer: COMMERCIAL

## 2024-12-23 DIAGNOSIS — D70.9 NEUTROPENIA (H): ICD-10-CM

## 2024-12-23 DIAGNOSIS — Z94.4 LIVER REPLACED BY TRANSPLANT (H): Chronic | ICD-10-CM

## 2024-12-23 DIAGNOSIS — K70.11 ALCOHOLIC HEPATITIS WITH ASCITES (H): Chronic | ICD-10-CM

## 2024-12-23 DIAGNOSIS — Z13.220 LIPID SCREENING: ICD-10-CM

## 2024-12-24 ENCOUNTER — TELEPHONE (OUTPATIENT)
Dept: FAMILY MEDICINE | Facility: CLINIC | Age: 54
End: 2024-12-24
Payer: COMMERCIAL

## 2024-12-24 DIAGNOSIS — R52 CHRONIC GENERALIZED PAIN DISORDER: Primary | ICD-10-CM

## 2024-12-24 DIAGNOSIS — G89.29 CHRONIC GENERALIZED PAIN DISORDER: Primary | ICD-10-CM

## 2024-12-24 NOTE — TELEPHONE ENCOUNTER
Forms/Letter Request    Type of form/letter: OTHER: Medical Request        Do we have the form/letter: YES- Placed on provider's desk    Who is the form from? [facility]    Where did/will the form come from? form was faxed in    How would you like the form/letter returned: Fax : [phone]

## 2024-12-25 PROBLEM — G89.29 CHRONIC GENERALIZED PAIN DISORDER: Status: ACTIVE | Noted: 2024-12-25

## 2024-12-25 PROBLEM — R52 CHRONIC GENERALIZED PAIN DISORDER: Status: ACTIVE | Noted: 2024-12-25

## 2024-12-30 ENCOUNTER — MYC REFILL (OUTPATIENT)
Dept: PALLIATIVE MEDICINE | Facility: CLINIC | Age: 54
End: 2024-12-30

## 2024-12-30 ENCOUNTER — PREP FOR PROCEDURE (OUTPATIENT)
Dept: GASTROENTEROLOGY | Facility: CLINIC | Age: 54
End: 2024-12-30

## 2024-12-30 ENCOUNTER — LAB (OUTPATIENT)
Dept: LAB | Facility: CLINIC | Age: 54
End: 2024-12-30
Payer: COMMERCIAL

## 2024-12-30 ENCOUNTER — PATIENT OUTREACH (OUTPATIENT)
Dept: GASTROENTEROLOGY | Facility: CLINIC | Age: 54
End: 2024-12-30

## 2024-12-30 DIAGNOSIS — Z94.4 LIVER REPLACED BY TRANSPLANT (H): Chronic | ICD-10-CM

## 2024-12-30 DIAGNOSIS — K83.1 COMMON BILE DUCT STRICTURE (H): Primary | ICD-10-CM

## 2024-12-30 DIAGNOSIS — R10.84 ABDOMINAL PAIN, GENERALIZED: ICD-10-CM

## 2024-12-30 DIAGNOSIS — G89.28 OTHER CHRONIC POSTPROCEDURAL PAIN: ICD-10-CM

## 2024-12-30 LAB
ALBUMIN SERPL BCG-MCNC: 3.7 G/DL (ref 3.5–5.2)
ALP SERPL-CCNC: 217 U/L (ref 40–150)
ALT SERPL W P-5'-P-CCNC: 22 U/L (ref 0–70)
ANION GAP SERPL CALCULATED.3IONS-SCNC: 7 MMOL/L (ref 7–15)
AST SERPL W P-5'-P-CCNC: 33 U/L (ref 0–45)
BILIRUB DIRECT SERPL-MCNC: <0.2 MG/DL (ref 0–0.3)
BILIRUB SERPL-MCNC: 0.3 MG/DL
BUN SERPL-MCNC: 33.4 MG/DL (ref 6–20)
CALCIUM SERPL-MCNC: 9.3 MG/DL (ref 8.8–10.4)
CHLORIDE SERPL-SCNC: 109 MMOL/L (ref 98–107)
CREAT SERPL-MCNC: 1.96 MG/DL (ref 0.67–1.17)
CYCLOSPORINE BLD LC/MS/MS-MCNC: 106 UG/L (ref 50–400)
EGFRCR SERPLBLD CKD-EPI 2021: 40 ML/MIN/1.73M2
ERYTHROCYTE [DISTWIDTH] IN BLOOD BY AUTOMATED COUNT: 13.9 % (ref 10–15)
GLUCOSE SERPL-MCNC: 210 MG/DL (ref 70–99)
HCO3 SERPL-SCNC: 20 MMOL/L (ref 22–29)
HCT VFR BLD AUTO: 31.8 % (ref 40–53)
HGB BLD-MCNC: 10.8 G/DL (ref 13.3–17.7)
MAGNESIUM SERPL-MCNC: 1.8 MG/DL (ref 1.7–2.3)
MCH RBC QN AUTO: 29.9 PG (ref 26.5–33)
MCHC RBC AUTO-ENTMCNC: 34 G/DL (ref 31.5–36.5)
MCV RBC AUTO: 88 FL (ref 78–100)
PHOSPHATE SERPL-MCNC: 4.2 MG/DL (ref 2.5–4.5)
PLATELET # BLD AUTO: 217 10E3/UL (ref 150–450)
POTASSIUM SERPL-SCNC: 5.6 MMOL/L (ref 3.4–5.3)
PROT SERPL-MCNC: 6.6 G/DL (ref 6.4–8.3)
RBC # BLD AUTO: 3.61 10E6/UL (ref 4.4–5.9)
SODIUM SERPL-SCNC: 136 MMOL/L (ref 135–145)
TME LAST DOSE: NORMAL H
TME LAST DOSE: NORMAL H
WBC # BLD AUTO: 6 10E3/UL (ref 4–11)

## 2024-12-30 PROCEDURE — 80158 DRUG ASSAY CYCLOSPORINE: CPT

## 2024-12-30 PROCEDURE — 83735 ASSAY OF MAGNESIUM: CPT

## 2024-12-30 PROCEDURE — 80053 COMPREHEN METABOLIC PANEL: CPT

## 2024-12-30 PROCEDURE — 82248 BILIRUBIN DIRECT: CPT

## 2024-12-30 PROCEDURE — 84100 ASSAY OF PHOSPHORUS: CPT

## 2024-12-30 PROCEDURE — 85027 COMPLETE CBC AUTOMATED: CPT

## 2024-12-30 PROCEDURE — 36415 COLL VENOUS BLD VENIPUNCTURE: CPT

## 2024-12-30 RX ORDER — PREGABALIN 50 MG/1
50 CAPSULE ORAL 2 TIMES DAILY
Qty: 60 CAPSULE | Refills: 0 | Status: SHIPPED | OUTPATIENT
Start: 2024-12-30

## 2024-12-30 NOTE — TELEPHONE ENCOUNTER
Received MyChart request from patient requesting refill(s) for pregabalin (LYRICA) 50 MG capsule     Last refilled on 11/29/24    Pt last seen on 11/29/24  Next appt scheduled for 01/10/25    Will facilitate refill.

## 2024-12-30 NOTE — PROGRESS NOTES
Please assist in scheduling:     Procedure/Imaging/Clinic: ERCP (Endoscopic Retrograde Cholangiopancreatography) with stent exchange  Physician: Tanya  Timin weeks  Scope time: Provider average   Anesthesia: General  Dx: Common bile duct stricture  Tier:3  Location: [facility] OR  OK to schedule while attending: Not specified by provider  Patient communication letter header:ERCP (Endoscopic Retrograde Cholangiopancreatography) with stent exchange

## 2024-12-30 NOTE — PROGRESS NOTES
Post ERCP on 24 with Dr. Martínez.      Follow-up recommendations:     - Observe clinical course and repeat LFTs in 1-2                             weeks.                             - ERCP with Dr. Martínez in 8 weeks for a repeat                             biliary interrogation and possible stent                             removal/exchange.     Liver Function Studies -   Recent Labs   Lab Test 24  0730   PROTTOTAL 6.6   ALBUMIN 3.7   BILITOTAL 0.3   ALKPHOS 217*   AST 33   ALT 22       Orders placed:   Please assist in scheduling:     Procedure/Imaging/Clinic: ERCP (Endoscopic Retrograde Cholangiopancreatography) with stent exchange  Physician: Tanya  Timin weeks  Scope time: Provider average   Anesthesia: General  Dx: Common bile duct stricture  Tier:3  Location: [facility]  OK to schedule while attending: Not specified by provider  Patient communication letter header:ERCP (Endoscopic Retrograde Cholangiopancreatography) with stent exchange    Procedure date offered: 25 (8 weeks) @  OR. Patient's wife has a strong preference to proceed at [facility] vs. [facility]. Agreeable to schedule 25 with Dr. Martínez at [facility].    Wife states that he had a low grade fever (tmax 100.7) a few days following procedure. Reported a metallic taste in his mouth. No bloody sputum or stools. Symptoms have since resolved.     Preop Plan: Transferred to PAC clinic for scheduling.     Med Review    Blood thinner -  None  ASA - None  Diabetic - insulin; empagliflozin (Jardiance) 3 day hold to begin 2/10/25  Injectable or oral medications for weight loss - None    Patient Education r/t procedure: Discussion/MyChart    Reviewed post procedure recommendations and discussed symptoms to report to our clinic. Patient articulated understanding.     Clinic contact and scheduling numbers verified for future questions/concerns.     Iris Elizabeth, RN Care Coordinator

## 2024-12-30 NOTE — TELEPHONE ENCOUNTER
Mary EMERY Pain Nurse57 minutes ago (6:54 AM)       Refills have been requested for the following medications:         pregabalin (LYRICA) 50 MG capsule [DEIDRE CASEY]     Preferred pharmacy: [id number] - [place], [place] - [address]

## 2024-12-30 NOTE — TELEPHONE ENCOUNTER
12/30/2024 11:14 AM     REFILL REQUEST:     This is a patient of mine. PDMP and Chart have been reviewed; refill request is appropriate.    Refilled for 30 day supply.  Script e-Prescribed to pharmacy    LESA Mendez, JOHN, FNP-C

## 2024-12-31 NOTE — TELEPHONE ENCOUNTER
FUTURE VISIT INFORMATION      SURGERY INFORMATION:  Date: 25  [facility]  Surgeon:  Will Martínez MD   Anesthesia Type:  general  Procedure: ENDOSCOPIC RETROGRADE CHOLANGIOPANCREATOGRAPHY, WITH REPLACEMENT OF TUBE OR STENT     RECORDS REQUESTED FROM:       Primary Care Provider:   Jimmie Hoyt MD   - [facility]    Most recent EKG+ Tracin24- [facility]    Most recent ECHO: 24    Most recent Coronary Angiogram: 24

## 2025-01-02 ENCOUNTER — OFFICE VISIT (OUTPATIENT)
Dept: FAMILY MEDICINE | Facility: CLINIC | Age: 55
End: 2025-01-02
Payer: COMMERCIAL

## 2025-01-02 VITALS
TEMPERATURE: 97.3 F | HEIGHT: 69 IN | WEIGHT: 220 LBS | RESPIRATION RATE: 13 BRPM | OXYGEN SATURATION: 100 % | SYSTOLIC BLOOD PRESSURE: 130 MMHG | DIASTOLIC BLOOD PRESSURE: 81 MMHG | BODY MASS INDEX: 32.58 KG/M2 | HEART RATE: 76 BPM

## 2025-01-02 DIAGNOSIS — Z79.4 TYPE 2 DIABETES MELLITUS WITHOUT COMPLICATION, WITH LONG-TERM CURRENT USE OF INSULIN (H): Chronic | ICD-10-CM

## 2025-01-02 DIAGNOSIS — R52 CHRONIC GENERALIZED PAIN DISORDER: Primary | ICD-10-CM

## 2025-01-02 DIAGNOSIS — E11.9 TYPE 2 DIABETES MELLITUS WITHOUT COMPLICATION, WITH LONG-TERM CURRENT USE OF INSULIN (H): Chronic | ICD-10-CM

## 2025-01-02 DIAGNOSIS — G47.00 PERSISTENT INSOMNIA: Chronic | ICD-10-CM

## 2025-01-02 DIAGNOSIS — Z94.4 LIVER REPLACED BY TRANSPLANT (H): Chronic | ICD-10-CM

## 2025-01-02 DIAGNOSIS — N18.32 CHRONIC KIDNEY DISEASE, STAGE 3B (H): ICD-10-CM

## 2025-01-02 DIAGNOSIS — G89.29 CHRONIC GENERALIZED PAIN DISORDER: Primary | ICD-10-CM

## 2025-01-02 DIAGNOSIS — R60.0 BILATERAL LEG EDEMA: ICD-10-CM

## 2025-01-02 PROBLEM — E88.01 ALPHA-1-ANTITRYPSIN DEFICIENCY (H): Status: ACTIVE | Noted: 2025-01-02

## 2025-01-02 ASSESSMENT — PAIN SCALES - GENERAL: PAINLEVEL_OUTOF10: MILD PAIN (2)

## 2025-01-02 NOTE — PROGRESS NOTES
"  Assessment & Plan     1.  Chronic generalized pain.  Currently in care of pain clinic.  Currently on pregabalin 50 mg twice daily.  Initially it helped.  Has an upcoming appointment and I am certain the dose will be adjusted.  2.  Leg edema bilateral.  Left little worse than right.  Recently noted.  On exam there is trace to 1+ edema.  Discussed wearing compression stocking.  Little reluctant to start diuretic therapy particular with chronic kidney disease but that is a possible consideration.  Edema could be related to venous incompetence or even chronic kidney disease.  3.  Chronic kidney disease stage IIIb with recent creatinine on 12/30/2024 having jumped up little bit to 1.96.  Potassium was also 5.6.  Being managed by transplant team.  4.  Type 2 diabetes mellitus without complication on long-term insulin.  In care of endocrinology.  Blood sugars have come down according to patient's wife.  5.  Liver replaced by transplant.  6.  Persistent insomnia unchanged.    Advised to return to see me in May.    BMI  Estimated body mass index is 32.96 kg/m  as calculated from the following:    Height as of this encounter: 1.74 m (5' 8.5\").    Weight as of this encounter: 99.8 kg (220 lb).   Weight management plan: Discussed healthy diet and exercise guidelines      Elmo Hernandez is a 54 year old, presenting for the following health issues:  Edema (ankles)      1/2/2025     3:45 PM   Additional Questions   Roomed by Marine   Accompanied by self         1/2/2025     3:45 PM   Patient Reported Additional Medications   Patient reports taking the following new medications no     History of Present Illness       Reason for visit:  Retaining liquids in legs    He eats 2-3 servings of fruits and vegetables daily.He consumes 2 sweetened beverage(s) daily.He exercises with enough effort to increase his heart rate 10 to 19 minutes per day.  He exercises with enough effort to increase his heart rate 4 days per week. He is " "missing 1 dose(s) of medications per week.  He is not taking prescribed medications regularly due to remembering to take.         Concern - edema  Onset: 2 weeks  Description: bloated  Intensity: mild  Progression of Symptoms:  worsening  Accompanying Signs & Symptoms: bloated  Previous history of similar problem: Yes  Precipitating factors:        Worsened by: n/a  Alleviating factors:        Improved by: na/.  Therapies tried and outcome: No    Patient came in accompanied by wife.  His concern is some leg edema that is noticed in the past month.  He continues to experience chronic pain.  The pain does limit his activity though he tries to remain active as much as he can.  He has not noticed changes in his breathing.  No shortness of breath.  No chest pain.  He otherwise feels good.    Review of Systems  Constitutional, HEENT, cardiovascular, pulmonary, GI, , musculoskeletal, neuro, skin, endocrine and psych systems are negative, except as otherwise noted.      Objective    /81 (BP Location: Right arm, Patient Position: Sitting, Cuff Size: Adult Regular)   Pulse 76   Temp 97.3  F (36.3  C) (Temporal)   Resp 13   Ht 1.74 m (5' 8.5\")   Wt 99.8 kg (220 lb)   SpO2 100%   BMI 32.96 kg/m    Body mass index is 32.96 kg/m .  Physical Exam   GENERAL: alert and no distress  NECK: no adenopathy, no asymmetry, masses, or scars  RESP: lungs clear to auscultation - no rales, rhonchi or wheezes  CV: regular rates and rhythm, normal S1 S2, no S3 or S4, no murmur, click or rub, peripheral pulses strong, and 1+ bilateral lower extremity pitting edema to lower leg    ABDOMEN: soft, nontender, no hepatosplenomegaly, no masses and bowel sounds normal  MS: extremities normal- no gross deformities noted     Latest Reference Range & Units 12/30/24 07:30   Sodium 135 - 145 mmol/L 136   Potassium 3.4 - 5.3 mmol/L 5.6 (H)   Chloride 98 - 107 mmol/L 109 (H)   Carbon Dioxide (CO2) 22 - 29 mmol/L 20 (L)   Urea Nitrogen 6.0 - 20.0 " mg/dL 33.4 (H)   Creatinine 0.67 - 1.17 mg/dL 1.96 (H)   GFR Estimate >60 mL/min/1.73m2 40 (L)   Calcium 8.8 - 10.4 mg/dL 9.3   Anion Gap 7 - 15 mmol/L 7   Magnesium 1.7 - 2.3 mg/dL 1.8   Phosphorus 2.5 - 4.5 mg/dL 4.2   Albumin 3.5 - 5.2 g/dL 3.7   Protein Total 6.4 - 8.3 g/dL 6.6   Alkaline Phosphatase 40 - 150 U/L 217 (H)   ALT 0 - 70 U/L 22   AST 0 - 45 U/L 33   (H): Data is abnormally high  (L): Data is abnormally low       Latest Reference Range & Units 12/30/24 07:30   WBC 4.0 - 11.0 10e3/uL 6.0   Hemoglobin 13.3 - 17.7 g/dL 10.8 (L)   Hematocrit 40.0 - 53.0 % 31.8 (L)   Platelet Count 150 - 450 10e3/uL 217   RBC Count 4.40 - 5.90 10e6/uL 3.61 (L)   MCV 78 - 100 fL 88   MCH 26.5 - 33.0 pg 29.9   MCHC 31.5 - 36.5 g/dL 34.0   RDW 10.0 - 15.0 % 13.9   (L): Data is abnormally low  Signed Electronically by: Jimmie Hoyt MD

## 2025-01-06 DIAGNOSIS — E87.5 HYPERKALEMIA: ICD-10-CM

## 2025-01-08 ENCOUNTER — TELEPHONE (OUTPATIENT)
Dept: FAMILY MEDICINE | Facility: CLINIC | Age: 55
End: 2025-01-08
Payer: COMMERCIAL

## 2025-01-08 ENCOUNTER — MYC REFILL (OUTPATIENT)
Dept: PALLIATIVE MEDICINE | Facility: CLINIC | Age: 55
End: 2025-01-08
Payer: COMMERCIAL

## 2025-01-08 ENCOUNTER — MYC MEDICAL ADVICE (OUTPATIENT)
Dept: PALLIATIVE MEDICINE | Facility: CLINIC | Age: 55
End: 2025-01-08
Payer: COMMERCIAL

## 2025-01-08 DIAGNOSIS — G89.28 OTHER CHRONIC POSTPROCEDURAL PAIN: ICD-10-CM

## 2025-01-08 DIAGNOSIS — R10.84 ABDOMINAL PAIN, GENERALIZED: ICD-10-CM

## 2025-01-08 RX ORDER — PREGABALIN 50 MG/1
50 CAPSULE ORAL 2 TIMES DAILY
Qty: 60 CAPSULE | Refills: 0 | Status: CANCELLED | OUTPATIENT
Start: 2025-01-08

## 2025-01-08 NOTE — TELEPHONE ENCOUNTER
Forms/Letter Request    Type of form/letter: OTHER: [employer] - release of info form        Do we have the form/letter: Yes: place in provider's in box    Who is the form from? Insurance comp    Where did/will the form come from? form was faxed in    When is form/letter needed by: ASAP    How would you like the form/letter returned: Fax : [phone]

## 2025-01-08 NOTE — TELEPHONE ENCOUNTER
Per patient OPPRTUNITYhart message (Brought over from the other 1/8/25 encounter):  Mary EMERY Pain Nurse (supporting Hillary Miranda DNP)25 minutes ago (1:14 PM)   Hal Mathur and team!  I sent a refill request for this medication, you can disregard it.  I misplaced the medication so we do have it.     Sorry for the inconvenience!!  Adina

## 2025-01-09 NOTE — PROGRESS NOTES
Mary Lopez  :  1970  DOS: 1/10/2025  MRN: [id number]  PCP: Jimmie Hoyt    Sports Medicine Clinic Visit    Interim History - January 10, 2025  - Last seen on 5/10/2024 for bilateral rotator cuff tendinopathy.   - Bilateral subacromial corticosteroid injections completed on 5/10/2024 provided 6 months of moderate relief in pain.    - Since the last visit, he notes a return in bilateral shoulder pain. Unable to decipher were pain is and what motions bother him. Takes Tylenol on occasion.   - No interim injury.       Interim History - May 10, 2024  - Last seen on 2024 for bilateral shoulder pain.     - 5/3/2024 right shoulder MRI Impression:  1. Multifocal low to moderate grade intrasubstance tear of supraspinatus and infraspinatus. No high-grade rotator cuff tear. No muscle bulk loss.  2. Query posterosuperior labral tear.  3. Moderate nonsimple appearing pleural effusion, increased from comparison CT incompletely assessed. Consider further evaluation with chest CT if clinically indicated.    -5/3/2024 left shoulder MRI Impression:  1. Focal moderate grade intrasubstance tear of the supraspinatus/infraspinous junctional fibers at the footprint. No muscle atrophy.  2. Query superior and posterosuperior labral tear.  3.  Findings may be seen in clinical entity of adhesive capsulitis. Please correlate clinically.    - Since the last visit, he notes no change in bilateral shoulder pain. Taking Tramadol only at night. No pain relieving measures during the day at this time. Hopeful for injections today.  Patient received approval from his transplant team and diabetes/endocrine for corticosteroid injections.  - No interim injury.     Initial Visit: 2024  HPI  Mary Lopez is a 53 year old male who is seen as a self referral presenting with right shoulder pain.    - Mechanism of Injury:    -  Fell in 10/2023 on the right shoulder, and has had pain on the anterior aspect that can  travel down upper arm intermittently.  Left shoulder is now painful from overuse on the anterior aspect of shoulder.  Similar to the right shoulder but not as intense.   - Pertinent history and prior evaluations:    - XR R shoulder 12/22/2023 shows normal anatomic alignment without significant degenerative changes, no acute fractures or dislocations.  - Noted single left rib fracture on 09/2023  - Doing physical therapy for the right shoulder.   - Liver transplant patient (3/4/2024) secondary to alcoholic cirrhosis, with residual memory problems since surgery. Also with T2DM on long and short acting insulin, currently with large uncontrolled fluctuations in glucoses throughout the day, last HbA1c 5.6 two weeks ago.     - Pain Character:    - Location:  Bilateral shoulder  - Character:  sharp  - Duration:  for last 6 months  - Course:  steady  - Endorses:    - weakness due to pain, decreased ROM/flexibility, decreased joint mobility, decreased strength and impaired posture making it difficult to do ADL, work tasks, recreational activities  - Denies:    - clicking/popping, grinding, mechanical locking symptoms, instability, numbness, tingling  - Alleviating factors:    - rest, activity modifications, tylenol, muscle relaxers, lidocaine patches, Voltaren gel  - Aggravating factors:    - lifting heavy objects, overhead activities, handgrip activities  - Other treatments tried:    - tylenol, lidocaine patches, Voltaren gel, flexeril    - Patient Goals:    - understand the cause of the symptoms, be able to manage the symptoms successfully  - Social History:   - On disability. Previous work:        Review of Systems  Musculoskeletal: as above  Remainder of review of systems is negative including constitutional, CV, pulmonary, GI, Skin and Neurologic except as noted in HPI or medical history.    Past Medical History:   Diagnosis Date    ANGEL (acute kidney injury)     Alcoholic cirrhosis of liver without  ascites (H) 07/13/2023    Alcoholic hepatitis with ascites (H) 10/03/2023    Alcoholic hepatitis without ascites (H) 07/13/2023    Chronic generalized pain disorder 12/25/2024    CKD stage 3a, GFR 45-59 ml/min (H) 08/08/2024    Closed fracture of one rib of left side 09/14/2023    Concussion without loss of consciousness 03/11/2020    Decompensated hepatic cirrhosis (H) 09/15/2023    Diabetes mellitus, type 2 (H) 11/22/2023    Essential hypertension 03/11/2020    Ganglion cyst of wrist, right 04/18/2024    History of biliary duct stent placement 09/05/2024    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)     Liver replaced by transplant (H) 03/05/2024    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    ACR PRAJAPATI 6-7/9, treated with steroids    Mild hyperlipidemia 12/07/2021    Persistent insomnia 07/13/2023    Portal hypertension (H) 07/13/2023    Right inguinal hernia 08/08/2024    Scrotal abscess     Secondary esophageal 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 (H) 07/13/2023    Tobacco abuse disorder 03/11/2020    Type 2 diabetes mellitus with hyperglycemia (H) 12/22/2023     Past Surgical History:   Procedure Laterality Date    BENCH LIVER  3/5/2024    Procedure: Bench liver;  Surgeon: Ryder Cortez MD;  [facility]    CHOLECYSTECTOMY      COLONOSCOPY N/A 1/2/2024    Procedure: COLONOSCOPY, WITH POLYPECTOMY;  Surgeon: Jak Urbina MD;  Location: [facility]    CV RIGHT HEART CATH MEASUREMENTS RECORDED N/A 1/30/2024    Procedure: Right Heart Catheterization;  Surgeon: Alfred Tafoya MD;  Location:  HE[facility] CARDIAC CATH LAB    ENDOSCOPIC RETROGRADE CHOLANGIOPANCREATOGRAM N/A 10/22/2024    Procedure: ENDOSCOPIC RETROGRADE CHOLANGIOPANCREATOGRAPHY, WITH bile duct stent stent exchange and balloon dilation;  Surgeon: Naldo Rodriguez MD;  [facility]    ENDOSCOPIC RETROGRADE CHOLANGIOPANCREATOGRAM N/A 12/17/2024    Procedure: ENDOSCOPIC RETROGRADE CHOLANGIOPANCREATOGRAPHY BILLIARY DILATION AND STENT  PLACEMENT;  Surgeon: Will Martínez MD;  Location: [facility]    ENDOSCOPIC RETROGRADE CHOLANGIOPANCREATOGRAPHY, EXCHANGE TUBE/STENT N/A 2024    Procedure: Endoscopic Retrograde Cholangiopancreatography with biliary sphincterotomy, balloon dilation, pancreatic stent placement,biliary stent exchange;  Surgeon: Naldo Rodriguez MD;  [facility]    ENTEROSCOPY SMALL BOWEL N/A 2024    Procedure: Enteroscopy small bowel;  Surgeon: Hugo Daigle MD;  [facility]    ESOPHAGOSCOPY, GASTROSCOPY, DUODENOSCOPY (EGD), COMBINED N/A 2024    Procedure: Esophagoscopy, gastroscopy, duodenoscopy (EGD), combined;  Surgeon: Hugo Daigle MD;  [facility]    HERNIORRHAPHY INGUINAL Right 2024    Procedure: Open Inguinal Hernia Repair;  Surgeon: Ryder Cortez MD;  [facility]    IR LIVER BIOPSY PERCUTANEOUS  3/15/2024    IR RETROPERITONEAL ABSCESS DRAINAGE  2024    TONSILLECTOMY      TRANSPLANT LIVER RECIPIENT  DONOR N/A 3/5/2024    Procedure: Transplant liver recipient  donor, bile duct stent placement;  Surgeon: Ryder Cortez MD;  [facility]    VASECTOMY       Family History   Problem Relation Age of Onset    Anxiety Disorder Mother     Depression Mother     Bipolar Disorder Mother     Chronic Obstructive Pulmonary Disease Mother     Lung Cancer Mother 81    Morbid Obesity Father     Diabetes Father     Diabetes Type 2  Brother     Substance Abuse Maternal Grandfather     Substance Abuse Paternal Grandfather     Colon Cancer No family hx of     Liver Disease No family hx of          Objective  There were no vitals taken for this visit.    General: healthy, alert and in no acute distress.    HEENT: no scleral icterus or conjunctival erythema.   Skin: no suspicious lesions or rash. No jaundice.   CV: regular rhythm by palpation, 2+ distal pulses.  Resp: normal respiratory effort without conversational dyspnea.   Psych: normal mood and  affect.    Gait: nonantalgic, appropriate coordination and balance.     Neuro:        - Sensation to light touch:    - Intact throughout the BUE including all peripheral nerve distributions.        - MSR:       JAGDISH MARTINOE  - Biceps  2+ 2+  - Brachioradialis 2+ 2+  - Triceps  2+ 2+       - Special tests:   - Spurling's:  Neg     MSK - Shoulder:       - Inspection:    - No significant swelling, erythema, warmth, ecchymosis, lesion, or atrophy noted.        - ROM:    - Full AROM/PROM with lateral shoulder pain during shoulder abduction, slightly with IR/ER       - Palpation:    - TTP at the lateral shoulder, subacromial space, rotator cuff insertion.   - NTTP elsewhere.        - Strength:  (*antalgic)  - Shoulder Abduction   4+*    - Shoulder Flexion   5-*    - Shoulder Internal Rotation  5-*    - Shoulder External Rotation  5-*   - Elbow Flexion   5   - Elbow Extension   5   - Forearm Pronation   5   - Forearm Supination   5   - Wrist Extension   5   - Wrist Flexion    5   - FDI     5   - ADM     5   - FPL     5   - APB     5   - EIP     5   - EDC     5   - APL/EPB    5            - Special tests:        - Santizo: Positive   - Neers: Positive   - Empty can: Positive for pain and weakness    - Lynn:  Neg    - Scarf:  Neg    - Speeds:  Neg    - Yergason:  Neg    - Apprehension/Relocation:  Neg       Radiology  I independently reviewed the available relevant imaging in the chart with my interpretations as above in HPI.     I independently reviewed today's new relevant imaging, with the following interpretation:  - XR B/L shoulders 4/19/2024 shows normal anatomic alignment without acute fracture or dislocation.  No significant degenerative changes.    Procedure  Large Joint Injection/Arthocentesis: bilateral subacromial bursa    Date/Time: 1/10/2025 10:04 AM    Performed by: Alex Rico DO  Authorized by: Alex Rico DO    Indications:  Pain  Needle Size:  22 G  Guidance: ultrasound    Approach:   Anterolateral  Location:  Shoulder  Laterality:  Bilateral      Site:  Bilateral subacromial bursa  Medications (Right):  40 mg triamcinolone 40 MG/ML; 2 mL lidocaine 1 %; 2 mL BUPivacaine 0.5 %  Medications (Left):  40 mg triamcinolone 40 MG/ML; 2 mL lidocaine 1 %; 2 mL BUPivacaine 0.5 %  Outcome:  Tolerated well, no immediate complications  Procedure discussed: discussed risks, benefits, and alternatives    Consent Given by:  Patient  Prep: patient was prepped and draped in usual sterile fashion     Ultrasound images of procedure were permanently stored.     Subacromial Bursa - Ultrasound Guided  The patient was informed of the risks and the benefits of the procedure and a written consent was signed. The patient s shoulder was prepped with chlorhexidine in sterile fashion.   An injectate solution containing 2 mL of 1% lidocaine, 2 mL of 0.5% Bupivacaine, and 1 mL of Kenalog (40 mg/mL) was drawn up into a 5 mL syringe. Injection was performed using sterile technique.  Under ultrasound guidance a 1.5-inch 22-gauge needle was used to enter the subacromial bursa.  An anterolateral approach was used with arm held in neutral Crass position.  Needle placement was visualized and documented with ultrasound.  Ultrasound visualization was necessary to ensure placement in to the bursa and not the rotator cuff tendon which could potentially cause further tendon damage.  Injection performed in-plane. Images were permanently stored for the patient's record. There were no complications. The patient tolerated the procedure well. There was negligible bleeding.  The procedure was duplicated on the contralateral side using an identical protocol.         Assessment  No diagnosis found.        Plan  Mary Lopez is a pleasant 53 year old male that presents with chronic bilateral shoulder pain.  Right shoulder pain started after a fall directly onto the shoulder in October 2023.  He has had anterior shoulder pain since that  time that will occasionally radiate down the arm.  He feels antalgic weakness in the shoulder and has been using it less and less due to the pain.  During that time, he has been overcompensating with the left shoulder and started to feel very similar symptoms on the left.  On exam there is positive impingement signs bilaterally with enough antalgic weakness to be concern for the possibility of rotator cuff tear, as well as the possibility of a more traumatic injury on the right.  He and wife are highly in favor of advanced imaging workup at this time and establishing a good short and long-term treatment plan for the shoulders while he is recovering from recent liver transplantation.     We discussed the nature of the condition and available treatment options, and mutually agreed upon the following plan:    - Imaging:          - Reviewed and independently interpreted the relevant imaging in the chart, including any imaging ordered for today's clinic.  - Reviewed results and images with patient.   -Will be important to establish the integrity of the rotator cuff complex with advanced imaging, MRI order has been placed for right and left shoulders and instructions given for scheduling the MRI and follow-up with me afterward.  - Medications:          - Discussed pharmacologic options for pain relief.   - May use NSAIDs (Ibuprofen, Naproxen) or Acetaminophen (Tylenol) as needed for pain control.   - Do not take these if previously advised to avoid them for other medical conditions.  - May also use topical medications such as lidocaine, IcyHot, BioFreeze, or Voltaren gel as needed for pain control.    - Voltaren gel is an anti-inflammatory cream that may be used up to 4 times per day over the painful area.   - Injections:          - Discussed possible injection options and alternatives.    - Injection options include: Possibility for intra-articular glenohumeral joint injection, subacromial/subdeltoid bursa injection with  corticosteroid if okay with transplant team.    - Deferred injections today and will consider them in the future as needed.   - Therapy:          - Discussed the benefits of therapy vs home exercise program for optimization of range of motion, flexibility, strength, stability and function.   - Preference is for therapy.   -Physical therapy referral placed today and instructed to call [phone] to schedule appointments.   - Modalities:          - May use ice, heat, massage or other modalities as needed.  - Surgery:          - Discussed non-operative and operative treatment options for the patient's condition. Goal is to continue conservative care for as long as possible before surgical intervention would need to be considered.  - Activity:          - Encouraged to remain active and participate in regular activities as symptoms allow.   Avoid or modify exacerbating activities as needed.  - Follow up:          - When results of the advanced imaging are available to discuss the results and next steps in treatment.   - May follow up sooner for new/worsening symptoms.  - May contact clinic by phone or MyChart for questions or concerns.     Updated Plan - 5/10/24:  Mary presents to discuss results of his recent MRI which shows rotator cuff tear partial tearing of the supraspinatus and infraspinatus bilaterally, possible labral tears as well.  He is interested in obtaining corticosteroid injections of bilateral subacromial bursa.  Subacromial bursa injections were performed today in clinic without complication, patient tolerated procedure well.  Also placed an order for physical therapy and instructions given for scheduling therapy appointments.  May contact clinic for questions/concerns.      Alex Rico DO, RAYNEM  [facility] - Sports Medicine  [facility] - Department of Orthopedic Surgery       Disclaimer:  This note was prepared and written using Dragon Medical dictation software. As  a result, there may be errors in the script that have gone undetected. Please consider this when interpreting the information in this note.

## 2025-01-10 ENCOUNTER — OFFICE VISIT (OUTPATIENT)
Dept: ORTHOPEDICS | Facility: CLINIC | Age: 55
End: 2025-01-10
Payer: COMMERCIAL

## 2025-01-10 ENCOUNTER — MYC MEDICAL ADVICE (OUTPATIENT)
Dept: PALLIATIVE MEDICINE | Facility: CLINIC | Age: 55
End: 2025-01-10

## 2025-01-10 DIAGNOSIS — M75.111 NONTRAUMATIC INCOMPLETE TEAR OF RIGHT ROTATOR CUFF: ICD-10-CM

## 2025-01-10 DIAGNOSIS — M75.112 NONTRAUMATIC INCOMPLETE TEAR OF LEFT ROTATOR CUFF: Primary | ICD-10-CM

## 2025-01-11 DIAGNOSIS — E11.00 TYPE 2 DIABETES MELLITUS WITH HYPEROSMOLARITY WITHOUT COMA, WITH LONG-TERM CURRENT USE OF INSULIN (H): ICD-10-CM

## 2025-01-11 DIAGNOSIS — Z79.4 TYPE 2 DIABETES MELLITUS WITH HYPEROSMOLARITY WITHOUT COMA, WITH LONG-TERM CURRENT USE OF INSULIN (H): ICD-10-CM

## 2025-01-13 RX ORDER — PEN NEEDLE, DIABETIC 32GX 5/32"
NEEDLE, DISPOSABLE MISCELLANEOUS
Qty: 100 EACH | Refills: 0 | Status: SHIPPED | OUTPATIENT
Start: 2025-01-13

## 2025-01-13 NOTE — TELEPHONE ENCOUNTER
"Patient wondering if Pregabalin will exacerbate sensitivity to cold temperatures.     \"Since [facility] transplant, he has been more sensitive to cold temperatures, mainly in his hands, causing severe pain for several minutes before the pain stops.   He just wants to be sure this medication won't increase his sensitivity to cold.\"     Routing to provider to review and advise.     Evelyn Marcelino RN     "

## 2025-01-16 ENCOUNTER — OFFICE VISIT (OUTPATIENT)
Dept: DERMATOLOGY | Facility: CLINIC | Age: 55
End: 2025-01-16
Payer: COMMERCIAL

## 2025-01-16 DIAGNOSIS — D18.01 CHERRY ANGIOMA: ICD-10-CM

## 2025-01-16 DIAGNOSIS — L82.1 SEBORRHEIC KERATOSIS: ICD-10-CM

## 2025-01-16 DIAGNOSIS — L81.4 LENTIGO: ICD-10-CM

## 2025-01-16 DIAGNOSIS — D22.9 MULTIPLE BENIGN NEVI: ICD-10-CM

## 2025-01-16 DIAGNOSIS — L82.0 INFLAMED SEBORRHEIC KERATOSIS: ICD-10-CM

## 2025-01-16 DIAGNOSIS — L73.2 HIDRADENITIS SUPPURATIVA: Primary | ICD-10-CM

## 2025-01-16 RX ORDER — CLINDAMYCIN PHOSPHATE 10 UG/ML
LOTION TOPICAL
Qty: 120 ML | Refills: 11 | Status: SHIPPED | OUTPATIENT
Start: 2025-01-16

## 2025-01-16 ASSESSMENT — PAIN SCALES - GENERAL: PAINLEVEL_OUTOF10: NO PAIN (0)

## 2025-01-16 NOTE — PROGRESS NOTES
Mary Lopez is a pleasant 54 year old year old male patient here today for skin check. He notes rough area on chest, present for years. He has had removed in the past returned benign but grew back. History of liver transplant 3/2024 currently on cyclosporine and mycophenolate. No painful or bleeding skin lesions. Patient has no other skin complaints today.  Remainder of the HPI, Meds, PMH, Allergies, FH, and SH was reviewed in chart.    Pertinent Hx:   History of liver transplant 3/2024  Past Medical History:   Diagnosis Date    ANGEL (acute kidney injury)     Alcoholic cirrhosis of liver without ascites (H) 07/13/2023    Alcoholic hepatitis with ascites (H) 10/03/2023    Alcoholic hepatitis without ascites (H) 07/13/2023    Chronic generalized pain disorder 12/25/2024    CKD stage 3a, GFR 45-59 ml/min (H) 08/08/2024    Closed fracture of one rib of left side 09/14/2023    Concussion without loss of consciousness 03/11/2020    Decompensated hepatic cirrhosis (H) 09/15/2023    Diabetes mellitus, type 2 (H) 11/22/2023    Essential hypertension 03/11/2020    Ganglion cyst of wrist, right 04/18/2024    History of biliary duct stent placement 09/05/2024    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)     Liver replaced by transplant (H) 03/05/2024    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    ACR PRAJAPATI 6-7/9, treated with steroids    Mild hyperlipidemia 12/07/2021    Persistent insomnia 07/13/2023    Portal hypertension (H) 07/13/2023    Right inguinal hernia 08/08/2024    Scrotal abscess     Secondary esophageal 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 (H) 07/13/2023    Tobacco abuse disorder 03/11/2020    Type 2 diabetes mellitus with hyperglycemia (H) 12/22/2023       Past Surgical History:   Procedure Laterality Date    BENCH LIVER  3/5/2024    Procedure: Bench liver;  Surgeon: Ryder Cortez MD;  Location: [facility]    CHOLECYSTECTOMY      COLONOSCOPY N/A 1/2/2024    Procedure: COLONOSCOPY, WITH POLYPECTOMY;   Surgeon: Jak Urbina MD;  Location: [facility]    CV RIGHT HEART CATH MEASUREMENTS RECORDED N/A 2024    Procedure: Right Heart Catheterization;  Surgeon: Alfred Tafoya MD;  Location: [facility]    ENDOSCOPIC RETROGRADE CHOLANGIOPANCREATOGRAM N/A 10/22/2024    Procedure: ENDOSCOPIC RETROGRADE CHOLANGIOPANCREATOGRAPHY, WITH bile duct stent stent exchange and balloon dilation;  Surgeon: Naldo Rodriguez MD;  Location: [facility]    ENDOSCOPIC RETROGRADE CHOLANGIOPANCREATOGRAM N/A 2024    Procedure: ENDOSCOPIC RETROGRADE CHOLANGIOPANCREATOGRAPHY BILLIARY DILATION AND STENT PLACEMENT;  Surgeon: Will Martínez MD;  Location: [facility]    ENDOSCOPIC RETROGRADE CHOLANGIOPANCREATOGRAPHY, EXCHANGE TUBE/STENT N/A 2024    Procedure: Endoscopic Retrograde Cholangiopancreatography with biliary sphincterotomy, balloon dilation, pancreatic stent placement,biliary stent exchange;  Surgeon: Naldo Rodriguez MD;  Location: [facility]    ENTEROSCOPY SMALL BOWEL N/A 2024    Procedure: Enteroscopy small bowel;  Surgeon: Hugo Daigle MD;  Location: [facility]    ESOPHAGOSCOPY, GASTROSCOPY, DUODENOSCOPY (EGD), COMBINED N/A 2024    Procedure: Esophagoscopy, gastroscopy, duodenoscopy (EGD), combined;  Surgeon: Hugo Daigle MD;  Location: [facility]    HERNIORRHAPHY INGUINAL Right 2024    Procedure: Open Inguinal Hernia Repair;  Surgeon: Ryder Cortez MD;  Location: [facility]    IR LIVER BIOPSY PERCUTANEOUS  3/15/2024    IR RETROPERITONEAL ABSCESS DRAINAGE  2024    TONSILLECTOMY      TRANSPLANT LIVER RECIPIENT  DONOR N/A 3/5/2024    Procedure: Transplant liver recipient  donor, bile duct stent placement;  Surgeon: Ryder Cortez MD;  Location: [facility]    VASECTOMY          Family History   Problem Relation Age of Onset    Anxiety Disorder Mother     Depression Mother     Bipolar Disorder Mother     Chronic Obstructive Pulmonary Disease Mother     Lung  Cancer Mother 81    Morbid Obesity Father     Diabetes Father     Diabetes Type 2  Brother     Substance Abuse Maternal Grandfather     Substance Abuse Paternal Grandfather     Colon Cancer No family hx of     Liver Disease No family hx of        Social History     Socioeconomic History    Marital status:      Spouse name: Not on file    Number of children: Not on file    Years of education: Not on file    Highest education level: Not on file   Occupational History    Occupation: Not working now   Tobacco Use    Smoking status: Former     Types: Cigarettes     Passive exposure: Never    Smokeless tobacco: Current     Types: Chew     Last attempt to quit: 1/1/2004    Tobacco comments:     Chew daily 1/8 of a tin per day   Vaping Use    Vaping status: Never Used   Substance and Sexual Activity    Alcohol use: Not Currently     Alcohol/week: 12.0 standard drinks of alcohol     Types: 12 Standard drinks or equivalent per week     Comment: Sober since 6/25/2023    Drug use: Not Currently    Sexual activity: Yes     Partners: Female     Birth control/protection: Male Surgical   Other Topics Concern    Parent/sibling w/ CABG, MI or angioplasty before 65F 55M? No   Social History Narrative    Not on file     Social Drivers of Health     Financial Resource Strain: Low Risk  (12/22/2023)    Financial Resource Strain     Within the past 12 months, have you or your family members you live with been unable to get utilities (heat, electricity) when it was really needed?: No   Food Insecurity: No Food Insecurity (6/22/2024)    Received from [facility]    Hunger Vital Sign     Worried About Running Out of Food in the Last Year: Never true     Ran Out of Food in the Last Year: Never true   Transportation Needs: No Transportation Needs (6/22/2024)    Received from [facility] [employer]    PRAPARE - Transportation     Lack of Transportation (Medical): No     Lack of Transportation (Non-Medical): No   Physical Activity:  Not on file   Stress: Not on file   Social Connections: Not on file   Interpersonal Safety: Low Risk  (12/17/2024)    Interpersonal Safety     Do you feel physically and emotionally safe where you currently live?: Yes     Within the past 12 months, have you been hit, slapped, kicked or otherwise physically hurt by someone?: No     Within the past 12 months, have you been humiliated or emotionally abused in other ways by your partner or ex-partner?: No   Housing Stability: Low Risk  (6/22/2024)    Received from [employer]    Housing Stability Vital Sign     Unable to Pay for Housing in the Last Year: No     Number of Times Moved in the Last Year: 0     Homeless in the Last Year: No       Outpatient Encounter Medications as of 1/16/2025   Medication Sig Dispense Refill    ACE/ARB/ARNI NOT PRESCRIBED (INTENTIONAL) Please choose reason not prescribed from choices below.      blood glucose (NO BRAND SPECIFIED) test strip Use to test blood sugar two times daily or as directed. To accompany: Blood Glucose Monitor Brands: per insurance. 100 strip 6    blood glucose monitoring (NO BRAND SPECIFIED) meter device kit Use to test blood sugar twice times daily or as directed. Preferred blood glucose meter OR supplies to accompany: Blood Glucose Monitor Brands: per insurance. 1 kit 0    clindamycin (CLEOCIN T) 1 % external lotion To affected areas on armpits, chest, back, groin daily. 120 mL 11    Continuous Glucose Sensor (DEXCOM G7 SENSOR) MISC Change every 10 days. 3 each 5    cycloSPORINE modified (GENERIC EQUIVALENT) 100 MG capsule Take 1 capsule (100 mg) by mouth every 12 hours. Total dose 150mg BID 60 capsule 11    cycloSPORINE modified (GENERIC EQUIVALENT) 25 MG capsule Take 3 capsules (75 mg) by mouth every 12 hours. Total dose 175mg  capsule 3    diclofenac (VOLTAREN) 1 % topical gel Apply 4 grams to knees four times daily using enclosed dosing card. 100 g 1    empagliflozin (JARDIANCE) 25 MG TABS tablet  Take 1 tablet (25 mg) by mouth daily. 90 tablet 1    Glucagon (GVOKE HYPOPEN) 1 MG/0.2ML pen Inject the contents of 1 device under the skin into lower abdomen, outer thigh, or outer upper arm as needed for hypoglycemia. If no response after 15 minutes, additional 1 mg dose from a new device may be injected while waiting for emergency assistance. 0.4 mL 1    Injection Device for insulin (CEQUR SIMPLICITY 2U) [id number] 1 each every 3 days 10 each 5    insulin degludec (TRESIBA FLEXTOUCH) 100 UNIT/ML pen Inject 16 units subcutaneous each am. Please do NOT fill today ( 9/4/2024 ). 15 mL 5    Insulin Lispro-aabc, 1 U Dial, (LYUMJEV KWIKPEN) 100 UNIT/ML SOPN Inject 6 Units Subcutaneous 3 times daily (with meals) 6 units with meals, plus correction. Pt may use up to 30 units daily. This REPLACES Humalog/Novolog insulin. 30 mL 2    insulin pen needle (29G X 12.7MM) 29G X 12.7MM miscellaneous Use 1 pen needle every three days or as directed. 90 each 1    insulin pen needle (32G X 4 MM) 32G X 4 MM miscellaneous Use as directed by provider Per insurance coverage 200 each 1    insulin pen needle (BD PEN NEEDLE SHERRIE 2ND GEN) 32G X 4 MM miscellaneous USES 5 PER  each 0    Magnesium Bisglycinate (MAG GLYCINATE) 100 MG TABS Take 1 tablet (100 mg) by mouth 2 times daily 180 tablet 1    methocarbamol (ROBAXIN) 750 MG tablet Take 1 tablet (750 mg) by mouth 3 times daily as needed for muscle spasms. 15 tablet 0    multivitamin w/minerals (THERA-VIT-M) tablet Take 1 tablet by mouth daily. 90 tablet 3    mycophenolic acid (GENERIC EQUIVALENT) 180 MG EC tablet Take 1 tablet (180 mg) by mouth 2 times daily. 180 tablet 2    omeprazole (PRILOSEC) 20 MG DR capsule Take 20 mg by mouth daily 180 capsule 1    pregabalin (LYRICA) 50 MG capsule Take 1 capsule (50 mg) by mouth 2 times daily. 60 capsule 0    pregabalin (LYRICA) 75 MG capsule Take 1 capsule (75 mg) by mouth at bedtime for 7 days, THEN 1 capsule (75 mg) 2 times daily for 23 days.  Continue with 50mg in AM for the first 7 days.. 53 capsule 0    QUEtiapine (SEROQUEL) 25 MG tablet Take 25 mg by mouth at bedtime.      sodium zirconium cyclosilicate (LOKELMA) 10 g PACK packet Take 1 packet (10 g) by mouth daily as needed (Hyperkalemia). 30 each 0    thin (NO BRAND SPECIFIED) lancets Use with lanceting device. To accompany: Blood Glucose Monitor Brands: per insurance. 100 each 6     No facility-administered encounter medications on file as of 1/16/2025.             O:   NAD, WDWN, Alert & Oriented, Mood & Affect wnl, Vitals stable   Here today alone   There were no vitals taken for this visit.   General appearance normal   Vitals stable   Alert, oriented and in no acute distress     Stuck on papules and brown macules on trunk and ext   Red papules on trunk  Brown papules and macules with regular pigment network and borders on torso and extremities   Brown stuck on plaque on right parietal scalp  Red papule on dorsum of nose  Comedones, scarring on axilla and chest    The remainder of skin exam is normal     Eyes: Conjunctivae/lids:Normal     ENT: Lips: normal    MSK:Normal    Cardiovascular: peripheral edema none    Pulm: Breathing Normal    Neuro/Psych: Orientation:Alert and Orientedx3 ; Mood/Affect:normal     A/P:  1. seborrheic keratosis on right parietal scalp   LN2:  Treated with LN2 for 5s for 1-2 cycles. Warned risks of blistering, pain, pigment change, scarring, and incomplete resolution.  Advised patient to return if lesions do not completely resolve.  Wound care sheet given.  Included with cosmetic charge of $225  2. Angioma on nose    After consent, anesthesia with LEC and prep, use hyfrecator to treat on setting of 10.  No complications and routine wound care.   Included with cosmetic charge $225.   3. Hidradenitis Suppurativa   Rare breakouts currently.   Continue hibiclens.   Apply clindamycin lotion daily. Discussed adding in low dose oral antibiotics.   4. Seborrheic keratosis,  lentigo, angioma, benign nevi, History of liver transplant, ganglion cyst on right wrist   Ganglion cyst on right wrist, not bothersome, not painful   It was a pleasure speaking to Mary Lopez today.  BENIGN LESIONS DISCUSSED WITH PATIENT:  I discussed the specifics of tumor, prognosis, and genetics of benign lesions.  I explained that treatment of these lesions would be purely cosmetic and not medically neccessary.  I discussed with patient different removal options including excision, cautery and /or laser.      Nature and genetics of benign skin lesions dicussed with patient.  Signs and Symptoms of skin cancer discussed with patient.  ABCDEs of melanoma reviewed with patient.  Patient encouraged to perform monthly skin exams.  UV precautions reviewed with patient.  Risks of non-melanoma skin cancer discussed with patient   Return to clinic in one year or sooner if needed.

## 2025-01-16 NOTE — LETTER
1/16/2025      Mary Lopez  [address] [place] [address]      Dear Colleague,    Thank you for referring your patient, Mary Lopez, to the [facility]. Please see a copy of my visit note below.    Mary Lopez is an extremely pleasant 54 year old year old male patient here today for skin check. He notes rough area on chest, present for years. He has had removed in the past returned benign but grew back. History of liver transplant 3/2024 currently on cyclosporine and mycophenolate. No painful or bleeding skin lesions. Patient has no other skin complaints today.  Remainder of the HPI, Meds, PMH, Allergies, FH, and SH was reviewed in chart.    Pertinent Hx:   History of liver transplant 3/2024  Past Medical History:   Diagnosis Date     ANGEL (acute kidney injury)      Alcoholic cirrhosis of liver without ascites (H) 07/13/2023     Alcoholic hepatitis with ascites (H) 10/03/2023     Alcoholic hepatitis without ascites (H) 07/13/2023     Chronic generalized pain disorder 12/25/2024     CKD stage 3a, GFR 45-59 ml/min (H) 08/08/2024     Closed fracture of one rib of left side 09/14/2023     Concussion without loss of consciousness 03/11/2020     Decompensated hepatic cirrhosis (H) 09/15/2023     Diabetes mellitus, type 2 (H) 11/22/2023     Essential hypertension 03/11/2020     Ganglion cyst of wrist, right 04/18/2024     History of biliary duct stent placement 09/05/2024     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)      Liver replaced by transplant (H) 03/05/2024     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    ACR PRAJAPATI 6-7/9, treated with steroids     Mild hyperlipidemia 12/07/2021     Persistent insomnia 07/13/2023     Portal hypertension (H) 07/13/2023     Right inguinal hernia 08/08/2024     Scrotal abscess      Secondary esophageal 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 (H) 07/13/2023     Tobacco abuse disorder 03/11/2020     Type 2 diabetes mellitus with  hyperglycemia (H) 2023       Past Surgical History:   Procedure Laterality Date     BENCH LIVER  3/5/2024    Procedure: Bench liver;  Surgeon: Ryder Cortez MD;  Location: [facility] OR     CHOLECYSTECTOMY       COLONOSCOPY N/A 2024    Procedure: COLONOSCOPY, WITH POLYPECTOMY;  Surgeon: Jak Urbina MD;  Location: [facility] GI     CV RIGHT HEART CATH MEASUREMENTS RECORDED N/A 2024    Procedure: Right Heart Catheterization;  Surgeon: Alfred Tafoya MD;  [facility] HEART CARDIAC [facility]ATH LAB     ENDOSCOPIC RETROGRADE CHOLANGIOPANCREATOGRAM N/A 10/22/2024    Procedure: ENDOSCOPIC RETROGRADE CHOLANGIOPANCREATOGRAPHY, WITH bile duct stent stent exchange and balloon dilation;  Surgeon: Naldo Rodriguez MD;  Location: [facility] OR     ENDOSCOPIC RETROGRADE CHOLANGIOPANCREATOGRAM N/A 2024    Procedure: ENDOSCOPIC RETROGRADE CHOLANGIOPANCREATOGRAPHY BILLIARY DILATION AND STENT PLACEMENT;  Surgeon: Will Martínez MD;  Location: [facility] OR     ENDOSCOPIC RETROGRADE CHOLANGIOPANCREATOGRAPHY, EXCHANGE TUBE/STENT N/A 2024    Procedure: Endoscopic Retrograde Cholangiopancreatography with biliary sphincterotomy, balloon dilation, pancreatic stent placement,biliary stent exchange;  Surgeon: Naldo Rodriguez MD;  Location[facility]     ENTEROSCOPY SMALL BOWEL N/A 2024    Procedure: Enteroscopy small bowel;  Surgeon: Hugo Daigle MD;  Location: [facility]     ESOPHAGOSCOPY, GASTROSCOPY, DUODENOSCOPY (EGD), COMBINED N/A 2024    Procedure: Esophagoscopy, gastroscopy, duodenoscopy (EGD), combined;  Surgeon: Hugo Daigle MD;  Location: [facility]     HERNIORRHAPHY INGUINAL Right 2024    Procedure: Open Inguinal Hernia Repair;  Surgeon: Ryder Cortez MD;  Location: [facility]     IR LIVER BIOPSY PERCUTANEOUS  3/15/2024     IR RETROPERITONEAL ABSCESS DRAINAGE  2024     TONSILLECTOMY       TRANSPLANT LIVER RECIPIENT  DONOR N/A 3/5/2024    Procedure: Transplant liver recipient   donor, bile duct stent placement;  Surgeon: Ryder Cortez MD;  Location: [facility]     VASECTOMY          Family History   Problem Relation Age of Onset     Anxiety Disorder Mother      Depression Mother      Bipolar Disorder Mother      Chronic Obstructive Pulmonary Disease Mother      Lung Cancer Mother 81     Morbid Obesity Father      Diabetes Father      Diabetes Type 2  Brother      Substance Abuse Maternal Grandfather      Substance Abuse Paternal Grandfather      Colon Cancer No family hx of      Liver Disease No family hx of        Social History     Socioeconomic History     Marital status:      Spouse name: Not on file     Number of children: Not on file     Years of education: Not on file     Highest education level: Not on file   Occupational History     Occupation: Not working now   Tobacco Use     Smoking status: Former     Types: Cigarettes     Passive exposure: Never     Smokeless tobacco: Current     Types: Chew     Last attempt to quit: 2004     Tobacco comments:     Chew daily 1/8 of a tin per day   Vaping Use     Vaping status: Never Used   Substance and Sexual Activity     Alcohol use: Not Currently     Alcohol/week: 12.0 standard drinks of alcohol     Types: 12 Standard drinks or equivalent per week     Comment: Sober since 2023     Drug use: Not Currently     Sexual activity: Yes     Partners: Female     Birth control/protection: Male Surgical   Other Topics Concern     Parent/sibling w/ CABG, MI or angioplasty before 65F 55M? No   Social History Narrative     Not on file     Social Drivers of Health     Financial Resource Strain: Low Risk  (2023)    Financial Resource Strain      Within the past 12 months, have you or your family members you live with been unable to get utilities (heat, electricity) when it was really needed?: No   Food Insecurity: No Food Insecurity (2024)    Received from [facility]    Hunger Vital Sign      Worried About  Running Out of Food in the Last Year: Never true      Ran Out of Food in the Last Year: Never true   Transportation Needs: No Transportation Needs (6/22/2024)    Received from [facility]    PRAPARE - Transportation      Lack of Transportation (Medical): No      Lack of Transportation (Non-Medical): No   Physical Activity: Not on file   Stress: Not on file   Social Connections: Not on file   Interpersonal Safety: Low Risk  (12/17/2024)    Interpersonal Safety      Do you feel physically and emotionally safe where you currently live?: Yes      Within the past 12 months, have you been hit, slapped, kicked or otherwise physically hurt by someone?: No      Within the past 12 months, have you been humiliated or emotionally abused in other ways by your partner or ex-partner?: No   Housing Stability: Low Risk  (6/22/2024)    Received from [facility]    Housing Stability Vital Sign      Unable to Pay for Housing in the Last Year: No      Number of Times Moved in the Last Year: 0      Homeless in the Last Year: No       Outpatient Encounter Medications as of 1/16/2025   Medication Sig Dispense Refill     ACE/ARB/ARNI NOT PRESCRIBED (INTENTIONAL) Please choose reason not prescribed from choices below.       blood glucose (NO BRAND SPECIFIED) test strip Use to test blood sugar two times daily or as directed. To accompany: Blood Glucose Monitor Brands: per insurance. 100 strip 6     blood glucose monitoring (NO BRAND SPECIFIED) meter device kit Use to test blood sugar twice times daily or as directed. Preferred blood glucose meter OR supplies to accompany: Blood Glucose Monitor Brands: per insurance. 1 kit 0     clindamycin (CLEOCIN T) 1 % external lotion To affected areas on armpits, chest, back, groin daily. 120 mL 11     Continuous Glucose Sensor (DEXCOM G7 SENSOR) MISC Change every 10 days. 3 each 5     cycloSPORINE modified (GENERIC EQUIVALENT) 100 MG capsule Take 1 capsule (100 mg) by mouth every 12  hours. Total dose 150mg BID 60 capsule 11     cycloSPORINE modified (GENERIC EQUIVALENT) 25 MG capsule Take 3 capsules (75 mg) by mouth every 12 hours. Total dose 175mg  capsule 3     diclofenac (VOLTAREN) 1 % topical gel Apply 4 grams to knees four times daily using enclosed dosing card. 100 g 1     empagliflozin (JARDIANCE) 25 MG TABS tablet Take 1 tablet (25 mg) by mouth daily. 90 tablet 1     Glucagon (GVOKE HYPOPEN) 1 MG/0.2ML pen Inject the contents of 1 device under the skin into lower abdomen, outer thigh, or outer upper arm as needed for hypoglycemia. If no response after 15 minutes, additional 1 mg dose from a new device may be injected while waiting for emergency assistance. 0.4 mL 1     Injection Device for insulin (CEQUR SIMPLICITY 2U) KHUSHBOO 1 each every 3 days 10 each 5     insulin degludec (TRESIBA FLEXTOUCH) 100 UNIT/ML pen Inject 16 units subcutaneous each am. Please do NOT fill today ( 9/4/2024 ). 15 mL 5     Insulin Lispro-aabc, 1 U Dial, (AMNAUMJEV KWIKPEN) 100 UNIT/ML SOPN Inject 6 Units Subcutaneous 3 times daily (with meals) 6 units with meals, plus correction. Pt may use up to 30 units daily. This REPLACES Humalog/Novolog insulin. 30 mL 2     insulin pen needle (29G X 12.7MM) 29G X 12.7MM miscellaneous Use 1 pen needle every three days or as directed. 90 each 1     insulin pen needle (32G X 4 MM) 32G X 4 MM miscellaneous Use as directed by provider Per insurance coverage 200 each 1     insulin pen needle (BD PEN NEEDLE SHERRIE 2ND GEN) 32G X 4 MM miscellaneous USES 5 PER  each 0     Magnesium Bisglycinate (MAG GLYCINATE) 100 MG TABS Take 1 tablet (100 mg) by mouth 2 times daily 180 tablet 1     methocarbamol (ROBAXIN) 750 MG tablet Take 1 tablet (750 mg) by mouth 3 times daily as needed for muscle spasms. 15 tablet 0     multivitamin w/minerals (THERA-VIT-M) tablet Take 1 tablet by mouth daily. 90 tablet 3     mycophenolic acid (GENERIC EQUIVALENT) 180 MG EC tablet Take 1 tablet (180  mg) by mouth 2 times daily. 180 tablet 2     omeprazole (PRILOSEC) 20 MG DR capsule Take 20 mg by mouth daily 180 capsule 1     pregabalin (LYRICA) 50 MG capsule Take 1 capsule (50 mg) by mouth 2 times daily. 60 capsule 0     pregabalin (LYRICA) 75 MG capsule Take 1 capsule (75 mg) by mouth at bedtime for 7 days, THEN 1 capsule (75 mg) 2 times daily for 23 days. Continue with 50mg in AM for the first 7 days.. 53 capsule 0     QUEtiapine (SEROQUEL) 25 MG tablet Take 25 mg by mouth at bedtime.       sodium zirconium cyclosilicate (LOKELMA) 10 g PACK packet Take 1 packet (10 g) by mouth daily as needed (Hyperkalemia). 30 each 0     thin (NO BRAND SPECIFIED) lancets Use with lanceting device. To accompany: Blood Glucose Monitor Brands: per insurance. 100 each 6     No facility-administered encounter medications on file as of 1/16/2025.             O:   NAD, WDWN, Alert & Oriented, Mood & Affect wnl, Vitals stable   Here today alone   There were no vitals taken for this visit.   General appearance normal   Vitals stable   Alert, oriented and in no acute distress     Stuck on papules and brown macules on trunk and ext   Red papules on trunk  Brown papules and macules with regular pigment network and borders on torso and extremities   Brown stuck on plaque on right parietal scalp  Red papule on dorsum of nose  Comedones, scarring on axilla and chest    The remainder of skin exam is normal     Eyes: Conjunctivae/lids:Normal     ENT: Lips: normal    MSK:Normal    Cardiovascular: peripheral edema none    Pulm: Breathing Normal    Neuro/Psych: Orientation:Alert and Orientedx3 ; Mood/Affect:normal     A/P:  1. seborrheic keratosis on right parietal scalp   LN2:  Treated with LN2 for 5s for 1-2 cycles. Warned risks of blistering, pain, pigment change, scarring, and incomplete resolution.  Advised patient to return if lesions do not completely resolve.  Wound care sheet given.  Included with cosmetic charge of $225  2. Angioma on  nose    After consent, anesthesia with LEC and prep, use hyfrecator to treat on setting of 10.  No complications and routine wound care.   Included with cosmetic charge $225.   3. Hidradenitis Suppurativa   Rare breakouts currently.   Continue hibiclens.   Apply clindamycin lotion daily. Discussed adding in low dose oral antibiotics.   4. Seborrheic keratosis, lentigo, angioma, benign nevi, History of liver transplant.   It was a pleasure speaking to Mary Lopez today.  BENIGN LESIONS DISCUSSED WITH PATIENT:  I discussed the specifics of tumor, prognosis, and genetics of benign lesions.  I explained that treatment of these lesions would be purely cosmetic and not medically neccessary.  I discussed with patient different removal options including excision, cautery and /or laser.      Nature and genetics of benign skin lesions dicussed with patient.  Signs and Symptoms of skin cancer discussed with patient.  ABCDEs of melanoma reviewed with patient.  Patient encouraged to perform monthly skin exams.  UV precautions reviewed with patient.  Risks of non-melanoma skin cancer discussed with patient   Return to clinic in one year or sooner if needed.     Again, thank you for allowing me to participate in the care of your patient.        Sincerely,        Jyoti Albright PA-C    Electronically signed

## 2025-01-26 ENCOUNTER — LAB (OUTPATIENT)
Dept: LAB | Facility: CLINIC | Age: 55
End: 2025-01-26
Payer: COMMERCIAL

## 2025-01-26 DIAGNOSIS — Z51.81 ENCOUNTER FOR THERAPEUTIC DRUG MONITORING: ICD-10-CM

## 2025-01-26 DIAGNOSIS — N18.32 CHRONIC KIDNEY DISEASE, STAGE 3B (H): Primary | ICD-10-CM

## 2025-01-26 DIAGNOSIS — Z94.4 LIVER REPLACED BY TRANSPLANT (H): Chronic | ICD-10-CM

## 2025-01-26 DIAGNOSIS — R10.31 RIGHT GROIN PAIN: ICD-10-CM

## 2025-01-26 LAB
ALBUMIN SERPL BCG-MCNC: 4.4 G/DL (ref 3.5–5.2)
ALP SERPL-CCNC: 274 U/L (ref 40–150)
ALT SERPL W P-5'-P-CCNC: 46 U/L (ref 0–70)
ANION GAP SERPL CALCULATED.3IONS-SCNC: 8 MMOL/L (ref 7–15)
AST SERPL W P-5'-P-CCNC: 40 U/L (ref 0–45)
BILIRUB DIRECT SERPL-MCNC: <0.2 MG/DL (ref 0–0.3)
BILIRUB SERPL-MCNC: 0.4 MG/DL
BUN SERPL-MCNC: 47.6 MG/DL (ref 6–20)
CALCIUM SERPL-MCNC: 9.2 MG/DL (ref 8.8–10.4)
CHLORIDE SERPL-SCNC: 108 MMOL/L (ref 98–107)
CREAT SERPL-MCNC: 1.81 MG/DL (ref 0.67–1.17)
EGFRCR SERPLBLD CKD-EPI 2021: 44 ML/MIN/1.73M2
ERYTHROCYTE [DISTWIDTH] IN BLOOD BY AUTOMATED COUNT: 14 % (ref 10–15)
EST. AVERAGE GLUCOSE BLD GHB EST-MCNC: 177 MG/DL
GLUCOSE SERPL-MCNC: 221 MG/DL (ref 70–99)
HBA1C MFR BLD: 7.8 % (ref 0–5.6)
HCO3 SERPL-SCNC: 20 MMOL/L (ref 22–29)
HCT VFR BLD AUTO: 37.2 % (ref 40–53)
HGB BLD-MCNC: 12.4 G/DL (ref 13.3–17.7)
MAGNESIUM SERPL-MCNC: 1.9 MG/DL (ref 1.7–2.3)
MCH RBC QN AUTO: 30.2 PG (ref 26.5–33)
MCHC RBC AUTO-ENTMCNC: 33.3 G/DL (ref 31.5–36.5)
MCV RBC AUTO: 91 FL (ref 78–100)
PHOSPHATE SERPL-MCNC: 4.1 MG/DL (ref 2.5–4.5)
PLATELET # BLD AUTO: 203 10E3/UL (ref 150–450)
POTASSIUM SERPL-SCNC: 5.5 MMOL/L (ref 3.4–5.3)
PROT SERPL-MCNC: 7.8 G/DL (ref 6.4–8.3)
PTH-INTACT SERPL-MCNC: 26 PG/ML (ref 15–65)
RBC # BLD AUTO: 4.1 10E6/UL (ref 4.4–5.9)
SODIUM SERPL-SCNC: 136 MMOL/L (ref 135–145)
WBC # BLD AUTO: 8.1 10E3/UL (ref 4–11)

## 2025-01-26 PROCEDURE — G0103 PSA SCREENING: HCPCS

## 2025-01-26 PROCEDURE — 83970 ASSAY OF PARATHORMONE: CPT

## 2025-01-26 PROCEDURE — 83735 ASSAY OF MAGNESIUM: CPT

## 2025-01-26 PROCEDURE — 36415 COLL VENOUS BLD VENIPUNCTURE: CPT

## 2025-01-26 PROCEDURE — 80053 COMPREHEN METABOLIC PANEL: CPT

## 2025-01-26 PROCEDURE — 83036 HEMOGLOBIN GLYCOSYLATED A1C: CPT

## 2025-01-26 PROCEDURE — 85027 COMPLETE CBC AUTOMATED: CPT

## 2025-01-26 PROCEDURE — 84100 ASSAY OF PHOSPHORUS: CPT

## 2025-01-26 PROCEDURE — 82248 BILIRUBIN DIRECT: CPT

## 2025-01-26 PROCEDURE — 80158 DRUG ASSAY CYCLOSPORINE: CPT

## 2025-01-27 LAB
CYCLOSPORINE BLD LC/MS/MS-MCNC: 114 UG/L (ref 50–400)
TME LAST DOSE: NORMAL H
TME LAST DOSE: NORMAL H

## 2025-01-28 ENCOUNTER — VIRTUAL VISIT (OUTPATIENT)
Dept: FAMILY MEDICINE | Facility: CLINIC | Age: 55
End: 2025-01-28
Payer: COMMERCIAL

## 2025-01-28 DIAGNOSIS — N18.32 CKD STAGE 3B, GFR 30-44 ML/MIN (H): ICD-10-CM

## 2025-01-28 DIAGNOSIS — Z94.4 LIVER REPLACED BY TRANSPLANT (H): Chronic | ICD-10-CM

## 2025-01-28 DIAGNOSIS — Z12.5 SCREENING FOR PROSTATE CANCER: ICD-10-CM

## 2025-01-28 DIAGNOSIS — R15.0 INCOMPLETE PASSAGE OF STOOL: ICD-10-CM

## 2025-01-28 DIAGNOSIS — R10.31 RIGHT GROIN PAIN: ICD-10-CM

## 2025-01-28 DIAGNOSIS — R39.12 POOR URINARY STREAM: Primary | ICD-10-CM

## 2025-01-28 LAB — PSA SERPL DL<=0.01 NG/ML-MCNC: 0.35 NG/ML (ref 0–3.5)

## 2025-01-28 PROCEDURE — 98006 SYNCH AUDIO-VIDEO EST MOD 30: CPT | Performed by: INTERNAL MEDICINE

## 2025-01-28 RX ORDER — TAMSULOSIN HYDROCHLORIDE 0.4 MG/1
0.4 CAPSULE ORAL DAILY
Qty: 90 CAPSULE | Refills: 1 | Status: SHIPPED | OUTPATIENT
Start: 2025-01-28

## 2025-01-28 NOTE — PROGRESS NOTES
"  If patient has telephone visit, have they been educated on video visit as preferred visit method and offered to change to video visit? N/A        Instructions Relayed to Patient by Virtual Roomer:     Patient is active on [employer]:   Relayed following to patient: \"It looks like you are active on [employer], are you able to join the visit this way? If not, do you need us to send you a link now or would you like your provider to send a link via text or email when they are ready to initiate the visit?\"      Patient Confirmed they will join visit via: [email or web address]  Reminded patient to ensure they were logged on to virtual visit by arrival time listed.   Asked if patient has flexibility to initiate visit sooner than arrival time: patient is unable to initiate visit earlier than arrival time     If pediatric virtual visit, ensured pediatric patient along with parent/guardian will be present for video visit.     Patient offered the website [email or web address] and/or phone number to [employer] Help line: [phone]      Mary is a 54 year old who is being evaluated via a billable video visit.    How would you like to obtain your AVS? [email or web address]  If the video visit is dropped, the invitation should be resent by: Text to cell phone: [phone]  Will anyone else be joining your video visit? Yes: Wife, Adina. How would they like to receive their invitation? Text to cell phone: [phone]      Assessment & Plan     1.  Poor urinary stream with patient feeling incomplete emptying of the bladder.  Will obtain a UA, PSA and start patient on tamsulosin 0.4 mg a day.  Could be BPH.  If he does not show improvement then will refer to urologist.  He will need to check out further up particularly postvoid residual urine.  2.  Incomplete passage of stool i.e. incomplete emptying of the rectum also noted noticeable last 3 weeks or so.  Stool is soft and he moves bowels every couple of days which is his normal.  I discussed the " use of lactulose to see.  He does not feel he needs to do that at this time.  3.  Right groin pain seems to be muscular.  When he is less active such as lifting it seems to subside.  It is a pulling sensation.  Will have to do in person visit for further evaluation.  4.  Chronic kidney disease stage IIIb.  5.  Screening for prostate cancer by checking PSA.  6.  Status post liver transplant    Elmo Hernandez is a 54 year old, presenting for the following health issues:  Gastrointestinal Problem (Issues with Bowel and Bladder)      1/28/2025     2:50 PM   Additional Questions   Roomed by Celsa ALMEIDA   Accompanied by Wife, Adina     History of Present Illness       Reason for visit:  Issues with Bowel and Bladder  Symptom onset:  3-4 weeks ago  Symptoms include:  Hesitation when trying to go, will start and stoping multiple times while trying to go, pain with going, feeling like pt can't completely empty bowel and bladder  Symptom intensity:  Mild  Symptom progression:  Staying the same  Had these symptoms before:  No  What makes it worse:  None  What makes it better:  None   He is taking medications regularly.     Brianda is a 54-year-old gentleman with few new issues.  He is status post liver transplant.  Last 3 weeks he has not noticed that his urinary stream is poor and even when he concentrates he has a hard time emptying his bladder.  He feels he is unable to empty it completely.  He notices dribbling as well.  No dysuria.  No hematuria no fever or chills.    Similarly he has noticed that after defecating there is still some stool left in the rectum.  He feels he empties incompletely.  He is also has moved his bowels every 2 to 3 days and that is not unusual.  Stool is still soft and not hard.  He had a colonoscopy in January 2024 that was negative.  He has been experiencing right groin pain which is a recurrent pain is a pulling sensation that in the groin area and then Canna radiates upwards for about a range.   Notices more when he is more active lifting etc.  When she backs off it gets better.      Review of Systems  Constitutional, HEENT, cardiovascular, pulmonary, GI, , musculoskeletal, neuro, skin, endocrine and psych systems are negative, except as otherwise noted.      Objective           Vitals:  No vitals were obtained today due to virtual visit.    Physical Exam   GENERAL: alert and no distress  EYES: Eyes grossly normal to inspection.  No discharge or erythema, or obvious scleral/conjunctival abnormalities.  RESP: No audible wheeze, cough, or visible cyanosis.    SKIN: Visible skin clear. No significant rash, abnormal pigmentation or lesions.  NEURO: Cranial nerves grossly intact.  Mentation and speech appropriate for age.  PSYCH: Appropriate affect, tone, and pace of words          Video-Visit Details    Type of service:  Video Visit   Originating Location (pt. Location): Home    Distant Location (provider location):  On-site  Platform used for Video Visit: Yenifer  Signed Electronically by: Jimmie Hoyt MD

## 2025-01-29 ENCOUNTER — TELEPHONE (OUTPATIENT)
Dept: GASTROENTEROLOGY | Facility: CLINIC | Age: 55
End: 2025-01-29
Payer: COMMERCIAL

## 2025-01-29 NOTE — TELEPHONE ENCOUNTER
Patient confirmed scheduled appointment:     Date: 4/1/25  Time: 8:00 am  Appointment Type: Liver Post-Return TXP HEPT  Provider: Dr. Nova  Location: [place]  Testing/imaging: Lab   Additional Notes:

## 2025-01-30 ENCOUNTER — VIRTUAL VISIT (OUTPATIENT)
Dept: SURGERY | Facility: CLINIC | Age: 55
End: 2025-01-30
Payer: COMMERCIAL

## 2025-01-30 ENCOUNTER — ANESTHESIA EVENT (OUTPATIENT)
Dept: SURGERY | Facility: CLINIC | Age: 55
End: 2025-01-30
Payer: COMMERCIAL

## 2025-01-30 ENCOUNTER — PRE VISIT (OUTPATIENT)
Dept: SURGERY | Facility: CLINIC | Age: 55
End: 2025-01-30

## 2025-01-30 ENCOUNTER — LAB (OUTPATIENT)
Dept: LAB | Facility: CLINIC | Age: 55
End: 2025-01-30
Payer: COMMERCIAL

## 2025-01-30 VITALS — BODY MASS INDEX: 31.84 KG/M2 | WEIGHT: 215 LBS | HEIGHT: 69 IN

## 2025-01-30 DIAGNOSIS — K83.1 COMMON BILE DUCT STRICTURE (H): ICD-10-CM

## 2025-01-30 DIAGNOSIS — Z01.818 PREOP EXAMINATION: Primary | ICD-10-CM

## 2025-01-30 DIAGNOSIS — R39.12 POOR URINARY STREAM: ICD-10-CM

## 2025-01-30 LAB
ALBUMIN UR-MCNC: NEGATIVE MG/DL
APPEARANCE UR: CLEAR
BILIRUB UR QL STRIP: NEGATIVE
COLOR UR AUTO: ABNORMAL
GLUCOSE UR STRIP-MCNC: >1000 MG/DL
HGB UR QL STRIP: NEGATIVE
KETONES UR STRIP-MCNC: NEGATIVE MG/DL
LEUKOCYTE ESTERASE UR QL STRIP: NEGATIVE
NITRATE UR QL: NEGATIVE
PH UR STRIP: 5.5 [PH] (ref 5–7)
RBC #/AREA URNS AUTO: NORMAL /HPF
SP GR UR STRIP: 1.01 (ref 1–1.03)
UROBILINOGEN UR STRIP-MCNC: NORMAL MG/DL
WBC #/AREA URNS AUTO: NORMAL /HPF

## 2025-01-30 ASSESSMENT — ENCOUNTER SYMPTOMS
SEIZURES: 0
ORTHOPNEA: 0

## 2025-01-30 ASSESSMENT — PAIN SCALES - GENERAL: PAINLEVEL_OUTOF10: NO PAIN (0)

## 2025-01-30 ASSESSMENT — COPD QUESTIONNAIRES: COPD: 0

## 2025-01-30 ASSESSMENT — LIFESTYLE VARIABLES: TOBACCO_USE: 1

## 2025-01-30 NOTE — PROGRESS NOTES
Mary is a 54 year old who is being evaluated via a billable video visit.    How would you like to obtain your AVS? MyChart  If the video visit is dropped, the invitation should be resent by: Text to cell phone: [phone]    Elmo Hernandez is a 54 year old, presenting for the following health issues:  Pre-Op Exam    HPI         Physical Exam

## 2025-01-30 NOTE — PATIENT INSTRUCTIONS
Preparing for Your Surgery      Name:  Mary Lopez   MRN:  [id number]   :  1970   Today's Date:  2025       Arriving for surgery:  Surgery date:  25  Arrival time:  07:00 am        Please come to:      [facility] Tour Desk Unit    [address] [place]  [address]     The [facility] [place] Patient/Visitor Ramp is at [address]. Patients and visitors who self-park will receive the reduced hospital parking rate. If the Patient /Visitor Ramp is full, please follow the signs to the [employer] car park located at the main hospital entrance.       parking is available (24 hours/ 7 days a week)      Discounted parking pass options are available for patients and visitors. They can be purchased at the [employer] desk at the [facility] hospital entrance.     -    Stop at the security desk and they will direct surgery patients to the Surgery Check in and Family Lounge. [phone]        - If you need directions, a wheelchair or an escort please stop at the Information/security desk in the lobby.     What can I eat or drink?  -  You may eat and drink normally up to 8 hours prior to arrival time.  -  You may have clear liquids until 2 hours prior to arrival time.    Examples of clear liquids:  Water  Clear broth  Juices (apple, white grape, white cranberry  and cider) without pulp  Noncarbonated, powder based beverages  (lemonade and Jarred-Aid)  Sodas (Sprite, 7-Up, ginger ale and seltzer)  Coffee or tea (without milk or cream)  Gatorade    -  No Alcohol or cannabis products for at least 24 hours before surgery.     Which medicines can I take?    Hold Aspirin for 7 days before surgery.   Hold Multivitamins for 7 days before surgery.  Hold Supplements for 7 days before surgery.  Hold Ibuprofen (Advil, Motrin) for 1 day(s) before surgery--unless otherwise directed by surgeon.  Hold Naproxen  (Aleve) for 4 days before surgery.    HOLD JARDIANCE X 3 DAYS BEFORE SURGERY  DO NOT TAKE LISPRO INSULIN AM OF SURGERY  TAKE 12 UNITS OF TRESIBA INSULIN AM OF SURGERY    -  DO NOT take these medications the day of surgery:  Lotions, voltaren gel, magnesium, lokelma.    -  PLEASE TAKE these medications the day of surgery:  Tylenol or robaxin if needed.  Take cyclosporine, mycophenolic acid, prolosec, flomax.    How do I prepare myself?  - Please take 2 showers (one the night prior to surgery and one the morning of surgery) using Scrubcare or Hibiclens soap.    Use this soap only from the neck to your toes. Avoid genital area      Leave the soap on your skin for one minute--then rinse thoroughly.      You may use your own shampoo and conditioner. No other hair products.   - Please remove all jewelry and body piercings.  - No lotions, deodorants or fragrance.  - No makeup or fingernail polish.   - Bring your ID and insurance card.    -For patients being admitted to the [facility]  Family members are to take the patient belongings with them and place them in the lockers provided in the Family Lounge.  Please limit the items you bring to 1 bag as the lockers are small.      -If you use a CPAP machine, please bring the CPAP machine, tubing, and mask to hospital.    -If you have a Deep Brain Stimulator, Spinal Cord Stimulator, or any Neuro Stimulator device---you must bring the remote control to the hospital.      ALL PATIENTS GOING HOME THE SAME DAY OF SURGERY ARE REQUIRED TO HAVE A RESPONSIBLE ADULT TO DRIVE AND BE IN ATTENDANCE WITH THEM FOR 24 HOURS FOLLOWING SURGERY.    Covid testing policy as of 12/06/2022  Your surgeon will notify and schedule you for a COVID test if one is needed before surgery--please direct any questions or COVID symptoms to your surgeon      Questions or Concerns:    - For any questions regarding the day of surgery or your hospital stay, please contact the Pre Admission Nursing Office at  [phone].       - If you have health changes between today and your surgery, please call your surgeon.       - For questions after surgery, please call your surgeons office.           Current Visitor Guidelines    You may have 2 visitors in the pre op area.    Visiting hours: 8 a.m. to 8:30 p.m.    Patients confirmed or suspected to have symptoms of COVID 19 or flu:     No visitors allowed for adult patients.   Children (under age 18) can have 1 named visitor.     People who are sick or showing symptoms of COVID 19 or flu:    Are not allowed to visit patients--we can only make exceptions in special situations.       Please follow these guidelines for your visit:          Please maintain social distance          Masking is optional--however at times you may be asked to wear a mask for the safety of yourself and others     Clean your hands with alcohol hand . Do this when you arrive at and leave the building and patient room,    And again after you touch your mask or anything in the room.     Go directly to and from the room you are visiting.     Stay in the patient s room during your visit. Limit going to other places in the hospital as much as possible     Leave bags and jackets at home or in the car.     For everyone s health, please don t come and go during your visit. That includes for smoking   during your visit.

## 2025-01-30 NOTE — H&P
Pre-Operative H & P     CC:  Preoperative exam to assess for increased cardiopulmonary risk while undergoing surgery and anesthesia.    Date of Encounter: 1/30/2025  Primary Care Physician:  Jimmie Hoyt     Reason for visit:   Encounter Diagnoses   Name Primary?    Preop examination Yes    Common bile duct stricture (H)        HPI  Mary Lopez is a 54 year old male who presents for pre-operative H & P in preparation for  Procedure Information       Case: [id number] Date/Time: 02/13/25 0910    Procedure: ENDOSCOPIC RETROGRADE CHOLANGIOPANCREATOGRAPHY, WITH REPLACEMENT OF TUBE OR STENT (Mouth)    Anesthesia type: General    Diagnosis: Common bile duct stricture (H) [K83.1]    Pre-op diagnosis: Common bile duct stricture (H) [K83.1]    Location:  OR [address] [place] [address] OR    Providers: Will Martínez MD            Mary Lopez is a 54-year-old male with a history of alcoholic cirrhosis status post liver transplant 3/2024 complicated with anastomotic biliary stricture. He most recently underwent a ERCP with biliary dilation and stent placement on 12/17/2024.  He is now scheduled for the above procedure for further management.    His past medical history is otherwise significant for hypertension, hyperlipidemia, mild pulmonary hypertension, tobacco use, anemia, alpha 1 antitrypsin deficiency, migraines, chronic pain syndrome, OA, diabetes mellitus, obesity, CKD, and alcohol use disorder in remission.    History is obtained from the patient, patient's wife, and chart review    Hx of abnormal bleeding or anti-platelet use: Denies.       Past Medical History  Past Medical History:   Diagnosis Date    ANGEL (acute kidney injury)     Alcoholic cirrhosis of liver without ascites (H) 07/13/2023    Alcoholic hepatitis with ascites (H) 10/03/2023    Alcoholic hepatitis without ascites (H) 07/13/2023    Chronic generalized pain disorder 12/25/2024    CKD stage 3a, GFR 45-59 ml/min (H) 08/08/2024    Closed fracture of  one rib of left side 09/14/2023    Concussion without loss of consciousness 03/11/2020    Decompensated hepatic cirrhosis (H) 09/15/2023    Diabetes mellitus, type 2 (H) 11/22/2023    Essential hypertension 03/11/2020    Ganglion cyst of wrist, right 04/18/2024    History of biliary duct stent placement 09/05/2024    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)     Liver replaced by transplant (H) 03/05/2024    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    ACR PRAJAPATI 6-7/9, treated with steroids    Mild hyperlipidemia 12/07/2021    Persistent insomnia 07/13/2023    Portal hypertension (H) 07/13/2023    Right inguinal hernia 08/08/2024    Scrotal abscess     Secondary esophageal 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 (H) 07/13/2023    Tobacco abuse disorder 03/11/2020    Type 2 diabetes mellitus with hyperglycemia (H) 12/22/2023       Past Surgical History  Past Surgical History:   Procedure Laterality Date    BENCH LIVER  3/5/2024    Procedure: Bench liver;  Surgeon: Ryder Cortez MD;  Location: [facility]    CHOLECYSTECTOMY      COLONOSCOPY N/A 1/2/2024    Procedure: COLONOSCOPY, WITH POLYPECTOMY;  Surgeon: Jak Urbina MD;  Location: [facility]    CV RIGHT HEART CATH MEASUREMENTS RECORDED N/A 1/30/2024    Procedure: Right Heart Catheterization;  Surgeon: Alfred Tafoya MD;  Location:  HEART CARDIAC CATH LAB    ENDOSCOPIC RETROGRADE CHOLANGIOPANCREATOGRAM N/A 10/22/2024    Procedure: ENDOSCOPIC RETROGRADE CHOLANGIOPANCREATOGRAPHY, WITH bile duct stent stent exchange and balloon dilation;  Surgeon: Naldo Rodriguez MD;  Location:  OR    ENDOSCOPIC RETROGRADE CHOLANGIOPANCREATOGRAM N/A 12/17/2024    Procedure: ENDOSCOPIC RETROGRADE CHOLANGIOPANCREATOGRAPHY BILLIARY DILATION AND STENT PLACEMENT;  Surgeon: Will Martínez MD;  Location:  OR    ENDOSCOPIC RETROGRADE CHOLANGIOPANCREATOGRAPHY, EXCHANGE TUBE/STENT N/A 8/13/2024    Procedure: Endoscopic Retrograde Cholangiopancreatography with biliary  sphincterotomy, balloon dilation, pancreatic stent placement,biliary stent exchange;  Surgeon: Naldo Rodriguez MD;  Location: [facility] OR    ENTEROSCOPY SMALL BOWEL N/A 2024    Procedure: Enteroscopy small bowel;  Surgeon: Hugo Daigle MD;  Location: [facility]    ESOPHAGOSCOPY, GASTROSCOPY, DUODENOSCOPY (EGD), COMBINED N/A 2024    Procedure: Esophagoscopy, gastroscopy, duodenoscopy (EGD), combined;  Surgeon: Hugo Daigle MD;  Location: [facility]    HERNIORRHAPHY INGUINAL Right 2024    Procedure: Open Inguinal Hernia Repair;  Surgeon: Ryder Cortez MD;  Location: [facility] OR    IR LIVER BIOPSY PERCUTANEOUS  3/15/2024    IR RETROPERITONEAL ABSCESS DRAINAGE  2024    TONSILLECTOMY      TRANSPLANT LIVER RECIPIENT  DONOR N/A 3/5/2024    Procedure: Transplant liver recipient  donor, bile duct stent placement;  Surgeon: Ryder Cortez MD;  Location: [facility] OR    VASECTOMY         Prior to Admission Medications  Current Outpatient Medications   Medication Sig Dispense Refill    pregabalin (LYRICA) 75 MG capsule Take 1 capsule (75 mg) by mouth at bedtime for 7 days, THEN 1 capsule (75 mg) 2 times daily for 23 days. Continue with 50mg in AM for the first 7 days.. 53 capsule 0    ACE/ARB/ARNI NOT PRESCRIBED (INTENTIONAL) Please choose reason not prescribed from choices below.      blood glucose (NO BRAND SPECIFIED) test strip Use to test blood sugar two times daily or as directed. To accompany: Blood Glucose Monitor Brands: per insurance. 100 strip 6    blood glucose monitoring (NO BRAND SPECIFIED) meter device kit Use to test blood sugar twice times daily or as directed. Preferred blood glucose meter OR supplies to accompany: Blood Glucose Monitor Brands: per insurance. 1 kit 0    clindamycin (CLEOCIN T) 1 % external lotion To affected areas on armpits, chest, back, groin daily. 120 mL 11    Continuous Glucose Sensor (DEXCOM G7 SENSOR) MISC Change every 10 days. 3 each 5     cycloSPORINE modified (GENERIC EQUIVALENT) 100 MG capsule Take 1 capsule (100 mg) by mouth every 12 hours. Total dose 150mg BID 60 capsule 11    cycloSPORINE modified (GENERIC EQUIVALENT) 25 MG capsule Take 3 capsules (75 mg) by mouth every 12 hours. Total dose 175mg  capsule 3    diclofenac (VOLTAREN) 1 % topical gel Apply 4 grams to knees four times daily using enclosed dosing card. 100 g 1    empagliflozin (JARDIANCE) 25 MG TABS tablet Take 1 tablet (25 mg) by mouth daily. 90 tablet 1    Glucagon (GVOKE HYPOPEN) 1 MG/0.2ML pen Inject the contents of 1 device under the skin into lower abdomen, outer thigh, or outer upper arm as needed for hypoglycemia. If no response after 15 minutes, additional 1 mg dose from a new device may be injected while waiting for emergency assistance. 0.4 mL 1    Injection Device for insulin (CEQUR SIMPLICITY 2U) [id number] 1 each every 3 days 10 each 5    insulin degludec (TRESIBA FLEXTOUCH) 100 UNIT/ML pen Inject 16 units subcutaneous each am. Please do NOT fill today ( 9/4/2024 ). 15 mL 5    Insulin Lispro-aabc, 1 U Dial, (LYUMJEV KWIKPEN) 100 UNIT/ML SOPN Inject 6 Units Subcutaneous 3 times daily (with meals) 6 units with meals, plus correction. Pt may use up to 30 units daily. This REPLACES Humalog/Novolog insulin. 30 mL 2    insulin pen needle (29G X 12.7MM) 29G X 12.7MM miscellaneous Use 1 pen needle every three days or as directed. 90 each 1    insulin pen needle (32G X 4 MM) 32G X 4 MM miscellaneous Use as directed by provider Per insurance coverage 200 each 1    insulin pen needle (BD PEN NEEDLE SHERRIE 2ND GEN) 32G X 4 MM miscellaneous USES 5 PER  each 0    Magnesium Bisglycinate (MAG GLYCINATE) 100 MG TABS Take 1 tablet (100 mg) by mouth 2 times daily 180 tablet 1    methocarbamol (ROBAXIN) 750 MG tablet Take 1 tablet (750 mg) by mouth 3 times daily as needed for muscle spasms. 15 tablet 0    multivitamin w/minerals (THERA-VIT-M) tablet Take 1 tablet by mouth daily.  90 tablet 3    mycophenolic acid (GENERIC EQUIVALENT) 180 MG EC tablet Take 1 tablet (180 mg) by mouth 2 times daily. 180 tablet 2    omeprazole (PRILOSEC) 20 MG DR capsule Take 20 mg by mouth daily 180 capsule 1    QUEtiapine (SEROQUEL) 25 MG tablet Take 25 mg by mouth at bedtime.      sodium zirconium cyclosilicate (LOKELMA) 10 g PACK packet Take 1 packet (10 g) by mouth daily as needed (Hyperkalemia). 30 each 0    tamsulosin (FLOMAX) 0.4 MG capsule Take 1 capsule (0.4 mg) by mouth daily. 90 capsule 1    thin (NO BRAND SPECIFIED) lancets Use with lanceting device. To accompany: Blood Glucose Monitor Brands: per insurance. 100 each 6       Allergies  Allergies   Allergen Reactions    Other Food Allergy Anaphylaxis     Legumes (black beans, baked beans, chickpeas)    Pineapple Itching       Social History  Social History     Socioeconomic History    Marital status:      Spouse name: Not on file    Number of children: Not on file    Years of education: Not on file    Highest education level: Not on file   Occupational History    Occupation: Not working now   Tobacco Use    Smoking status: Former     Types: Cigarettes     Passive exposure: Never    Smokeless tobacco: Current     Types: Chew     Last attempt to quit: 1/1/2004    Tobacco comments:     Chew daily 1/8 of a tin per day   Vaping Use    Vaping status: Never Used   Substance and Sexual Activity    Alcohol use: Not Currently     Alcohol/week: 12.0 standard drinks of alcohol     Types: 12 Standard drinks or equivalent per week     Comment: Sober since 6/25/2023    Drug use: Not Currently    Sexual activity: Yes     Partners: Female     Birth control/protection: Male Surgical   Other Topics Concern    Parent/sibling w/ CABG, MI or angioplasty before 65F 55M? No   Social History Narrative    Not on file     Social Drivers of Health     Financial Resource Strain: Low Risk  (12/22/2023)    Financial Resource Strain     Within the past 12 months, have you or  your family members you live with been unable to get utilities (heat, electricity) when it was really needed?: No   Food Insecurity: No Food Insecurity (6/22/2024)    Received from [facility]    Hunger Vital Sign     Worried About Running Out of Food in the Last Year: Never true     Ran Out of Food in the Last Year: Never true   Transportation Needs: No Transportation Needs (6/22/2024)    Received from [facility]    PRAPARE - Transportation     Lack of Transportation (Medical): No     Lack of Transportation (Non-Medical): No   Physical Activity: Not on file   Stress: Not on file   Social Connections: Not on file   Interpersonal Safety: Low Risk  (12/17/2024)    Interpersonal Safety     Do you feel physically and emotionally safe where you currently live?: Yes     Within the past 12 months, have you been hit, slapped, kicked or otherwise physically hurt by someone?: No     Within the past 12 months, have you been humiliated or emotionally abused in other ways by your partner or ex-partner?: No   Housing Stability: Low Risk  (6/22/2024)    Received from [facility]    Housing Stability Vital Sign     Unable to Pay for Housing in the Last Year: No     Number of Times Moved in the Last Year: 0     Homeless in the Last Year: No       Family History  Family History   Problem Relation Age of Onset    Anxiety Disorder Mother     Depression Mother     Bipolar Disorder Mother     Chronic Obstructive Pulmonary Disease Mother     Lung Cancer Mother 81    Morbid Obesity Father     Diabetes Father     Diabetes Type 2  Brother     Substance Abuse Maternal Grandfather     Substance Abuse Paternal Grandfather     Colon Cancer No family hx of     Liver Disease No family hx of        Review of Systems  The complete review of systems is negative other than noted in the HPI or here.   Anesthesia Evaluation   Pt has had prior anesthetic.     History of anesthetic complications  - .  Patient reports slow wake up  and nausea following the last ERCP in December. He has not previously had these issues with prior surgeries..    ROS/MED HX  ENT/Pulmonary:     (+)                tobacco use (chewing), Current use,                    (-) asthma, COPD, sleep apnea and recent URI   Neurologic:     (+)      migraines,                       (-) no seizures and no CVA   Cardiovascular: Comment: - Mild pulmonary hypertension on RHC 1/30/24  - Minimal, non-obstructive CAD per CTA angio 1/19/24    (+) Dyslipidemia hypertension- -   -  - -                                 Previous cardiac testing   Echo: Date: 2/24/24 Results:  Interpretation Summary  No significant valvular abnormalities were noted. No vegetation or mass  identified.  ______________________________________________________________________________  Left Ventricle  Global and regional left ventricular function is normal with an EF of 60-65%.     Right Ventricle  Right ventricular function, chamber size, wall motion, and thickness are  normal.     Mitral Valve  The mitral valve is normal.     Aortic Valve  Aortic valve is normal in structure and function.     Tricuspid Valve  The tricuspid valve is normal.     Pulmonic Valve  The pulmonic valve is normal.    Stress Test:  Date: Results:    ECG Reviewed:  Date: 6/26/24 Results:  ST  Cath:  Date: 1/30/24 Results:  RHC Result Date: 1/30/2024     Right sided filling pressures are mildly elevated.    Left sided filling pressures are normal.    Mild elevated pulmonary hypertension.    Hyperdynamic cardiac output level. Minimally elevated PA pressures (mPA 20 mmHg) with minimally elevated right sided filling pressures. PVR < 1 DIAMOND High cardiac output by Naveed and Thermodilution due to cirrhosis. High output is causing slight elevation in PA pressures but there is no intrinsic pulmonary vascular disease or right heart dysfunction.    (-) MOE and orthopnea/PND   METS/Exercise Tolerance: >4 METS Comment: Able to walk x 10 mins continuously  and ascend a flight of stairs without exertional symptoms.    Hematologic:     (+)      anemia, history of blood transfusion, no previous transfusion reaction, Known PRBC Anitbodies:No    (-) history of blood clots   Musculoskeletal:   (+)  arthritis (knees),             GI/Hepatic: Comment: - alcoholic cirrhosis s/p liver transplant 3/2024 complicated with anastomotic biliary stricture s/p ERCP with biliary dilation and stent placement on 12/17/24.   - alpha 1 antitrypsin deficiency  - H/o esophageal varices     (+) GERD, Asymptomatic on medication,           liver disease,       Renal/Genitourinary:     (+) renal disease, type: CRI, Pt does not require dialysis,           Endo:     (+)  type II DM, Last HgA1c: 7.8, date: 1/26/25, Using insulin,          Obesity,    (-) chronic steroid usage   Psychiatric/Substance Use:     (+)   alcohol abuse      Infectious Disease:     (+)    VRE,     (-) Recent Fever   Malignancy:  - neg malignancy ROS     Other:      (+)  , H/O Chronic Pain,         Virtual visit -  No vitals were obtained    Physical Exam  Constitutional: Awake, alert, cooperative, no apparent distress, and appears stated age.  Eyes: Pupils equal  HENT: Normocephalic  Respiratory: non labored breathing   Neurologic: Awake, alert, oriented to name, place and time.   Neuropsychiatric: Calm, cooperative. Normal affect.      Prior Labs/Diagnostic Studies   All labs and imaging personally reviewed     Component      Latest Ref Rng 1/26/2025  9:20 AM   Sodium      135 - 145 mmol/L 136    Potassium      3.4 - 5.3 mmol/L 5.5 (H)    Chloride      98 - 107 mmol/L 108 (H)    Carbon Dioxide (CO2)      22 - 29 mmol/L 20 (L)    Anion Gap      7 - 15 mmol/L 8    Urea Nitrogen      6.0 - 20.0 mg/dL 47.6 (H)    Creatinine      0.67 - 1.17 mg/dL 1.81 (H)    GFR Estimate      >60 mL/min/1.73m2 44 (L)    Calcium      8.8 - 10.4 mg/dL 9.2    Glucose      70 - 99 mg/dL 221 (H)    WBC      4.0 - 11.0 10e3/uL 8.1    RBC Count       4.40 - 5.90 10e6/uL 4.10 (L)    Hemoglobin      13.3 - 17.7 g/dL 12.4 (L)    Hematocrit      40.0 - 53.0 % 37.2 (L)    MCV      78 - 100 fL 91    MCH      26.5 - 33.0 pg 30.2    MCHC      31.5 - 36.5 g/dL 33.3    RDW      10.0 - 15.0 % 14.0    Platelet Count      150 - 450 10e3/uL 203    Protein Total      6.4 - 8.3 g/dL 7.8    Albumin      3.5 - 5.2 g/dL 4.4    Bilirubin Total      <=1.2 mg/dL 0.4    Alkaline Phosphatase      40 - 150 U/L 274 (H)    AST      0 - 45 U/L 40    ALT      0 - 70 U/L 46    Bilirubin Direct      0.00 - 0.30 mg/dL <0.20       Legend:  (H) High  (L) Low    EKG/ stress test - if available please see in ROS above     CTA Angio 1/19/24:  IMPRESSION:  1.  Minimal non-obstructive coronary artery disease.  2.  Total Agatston score 7 placing the patient in the 60th percentile  when compared to age and gender matched control group.  3.  Please review Radiology report for incidental noncardiac findings  that will follow separately.    The patient's records and results personally reviewed by this provider.     Outside records reviewed from: Care Everywhere      Assessment    Mary Lopez is a 54 year old male seen as a PAC referral for risk assessment and optimization for anesthesia.    Plan/Recommendations  Pt will be optimized for the proposed procedure.  See below for details on the assessment, risk, and preoperative recommendations    NEUROLOGY  - No history of TIA, CVA or seizure  -Post Op delirium risk factors:  No risk identified    ENT  - No current airway concerns.  Will need to be reassessed day of surgery.  Mallampati: Unable to assess  TM: Unable to assess    CARDIAC  - H/o hypertension, not currently managed on medication.   - Minimal non-obstructive coronary artery disease per recent CTA angio.  Today the patient reports ability to achieve greater than 4 METS and denies any symptoms of chest pain, chest tightness, or shortness of breath.    - METS (Metabolic  "Equivalents)  Patient performs 4 or more METS exercise without symptoms             Total Score: 0      RCRI-Low risk: Class 2 0.9% complication rate             Total Score: 1    RCRI: Diabetes        PULMONARY  JAYLIN Low Risk             Total Score: 2    JAYLIN: Over 50 ys old    JAYLIN: Male      - Denies asthma or inhaler use  - Tobacco History    History   Smoking Status    Former    Types: Cigarettes   Smokeless Tobacco    Current    Types: Chew    Last attempt to quit: 1/1/2004       GI  - H/o GERD. Patient reports symptoms are well-controlled on omeprazole.   PONV Medium Risk  Total Score: 2           1 AN PONV: Patient is not a current smoker    1 AN PONV: Intended Post Op Opioids      - alcoholic cirrhosis s/p liver transplant 3/2024 complicated with anastomotic biliary stricture s/p ERCP with biliary dilation and stent placement on 12/17/24. See HPI - procedure planned as above.   Continue cellcept and cyclosporine on DOS.   - H/o esophageal varices     /RENAL  - CKD, baseline Creatinine  1.8    ENDOCRINE    - BMI: Estimated body mass index is 32.22 kg/m  as calculated from the following:    Height as of this encounter: 1.74 m (5' 8.5\").    Weight as of this encounter: 97.5 kg (215 lb).  Obesity (BMI >30)  - Diabetes  Diabetes,  insulin dependent. Hold morning short acting insulin DOS. Take 80% of last scheduled dose of long-acting insulin prior to procedure.  Hold Jardiance x 3 days prior to surgery. Recommend close monitoring of the patient's blood glucose levels throughout the perioperative period.    HEME  VTE Low Risk 0.5%             Total Score: 2    VTE: Male      - No history of abnormal bleeding or antiplatelet use.  - Chronic anemia. Hgb on 1/26/25 was 12.4.   Recommend perioperative use of blood conservation techniques intraoperatively and close monitoring for postoperative bleeding.    MSK  Patient is NOT Frail             Total Score: 0        PSYCH  - H/o alcohol use disorder in remission since " 6/2023.    Different anesthesia methods/types have been discussed with the patient, but they are aware that the final plan will be decided by the assigned anesthesia provider on the date of service.    The patient is optimized for their procedure. AVS with information on surgery time/arrival time, meds and NPO status given by nursing staff. No further diagnostic testing indicated.    Please refer to the physical examination documented by the anesthesiologist in the anesthesia record on the day of surgery.    Video-Visit Details    Type of service:  Video Visit    Provider received verbal consent for a Video Visit from the patient? Yes     Originating Location (pt. Location): Home    Distant Location (provider location):  Off-site  Mode of Communication:  Video Conference via [employer]  On the day of service:     Prep time: 20 minutes  Visit time: 12 minutes  Documentation time: 20 minutes  ------------------------------------------  Total time: 52 minutes      LESA Vasquez CNP  Preoperative Assessment Center  [facility]  Phone: [phone]  Fax: [phone]

## 2025-02-08 DIAGNOSIS — E11.00 TYPE 2 DIABETES MELLITUS WITH HYPEROSMOLARITY WITHOUT COMA, WITH LONG-TERM CURRENT USE OF INSULIN (H): ICD-10-CM

## 2025-02-08 DIAGNOSIS — Z79.4 TYPE 2 DIABETES MELLITUS WITH HYPEROSMOLARITY WITHOUT COMA, WITH LONG-TERM CURRENT USE OF INSULIN (H): ICD-10-CM

## 2025-02-10 DIAGNOSIS — R10.84 ABDOMINAL PAIN, GENERALIZED: ICD-10-CM

## 2025-02-10 DIAGNOSIS — G89.28 OTHER CHRONIC POSTPROCEDURAL PAIN: ICD-10-CM

## 2025-02-10 RX ORDER — PEN NEEDLE, DIABETIC 32GX 5/32"
NEEDLE, DISPOSABLE MISCELLANEOUS
Qty: 100 EACH | Refills: 3 | Status: SHIPPED | OUTPATIENT
Start: 2025-02-10

## 2025-02-10 NOTE — TELEPHONE ENCOUNTER
Received fax request from [facility] pharmacy requesting refill(s) for pregabalin (LYRICA) 75 MG capsule      Last refilled on 01/13/25    Pt last seen on 01/10/25  Next appt scheduled for 02/28/25    Will facilitate refill.    Patient is currently taking 75MG BID

## 2025-02-11 ENCOUNTER — MYC REFILL (OUTPATIENT)
Dept: FAMILY MEDICINE | Facility: CLINIC | Age: 55
End: 2025-02-11
Payer: COMMERCIAL

## 2025-02-11 DIAGNOSIS — K70.30 ALCOHOLIC CIRRHOSIS OF LIVER WITHOUT ASCITES (H): ICD-10-CM

## 2025-02-11 DIAGNOSIS — K70.10 ALCOHOLIC HEPATITIS WITHOUT ASCITES (H): ICD-10-CM

## 2025-02-11 RX ORDER — MULTIPLE VITAMINS W/ MINERALS TAB 9MG-400MCG
1 TAB ORAL DAILY
Qty: 90 TABLET | Refills: 3 | OUTPATIENT
Start: 2025-02-11

## 2025-02-11 RX ORDER — QUETIAPINE FUMARATE 25 MG/1
25 TABLET, FILM COATED ORAL AT BEDTIME
Status: CANCELLED | OUTPATIENT
Start: 2025-02-11

## 2025-02-11 NOTE — TELEPHONE ENCOUNTER
Left message on answering machine for patient to call back to [phone]    Vicki BAÑUELOSN, RN

## 2025-02-11 NOTE — TELEPHONE ENCOUNTER
Clinic RN: Please investigate patient's chart or contact patient if the information cannot be found because the medication is listed as historical or discontinued. Confirm patient is taking this medication. Document findings and route refill encounter to provider for approval or denial.    Ren Ponce, RN, BSN, MSN  RN Lead

## 2025-02-11 NOTE — TELEPHONE ENCOUNTER
"Pt's wife, Rosio (CTC on file), returning clinic call. Pt takes \"occasionally,\" but they are going on a trip and she would like him to have this just in case. While on the phone, Rosio looked and said pt has enough remaining and does not need a refill at this time.    Request canceled.     Cassie Barrientos RN  "

## 2025-02-13 ENCOUNTER — HOSPITAL ENCOUNTER (OUTPATIENT)
Facility: CLINIC | Age: 55
Discharge: HOME | End: 2025-02-13
Attending: INTERNAL MEDICINE | Admitting: INTERNAL MEDICINE
Payer: COMMERCIAL

## 2025-02-13 ENCOUNTER — ANESTHESIA (OUTPATIENT)
Dept: SURGERY | Facility: CLINIC | Age: 55
End: 2025-02-13
Payer: COMMERCIAL

## 2025-02-13 ENCOUNTER — APPOINTMENT (OUTPATIENT)
Dept: GENERAL RADIOLOGY | Facility: CLINIC | Age: 55
End: 2025-02-13
Attending: INTERNAL MEDICINE
Payer: COMMERCIAL

## 2025-02-13 VITALS
SYSTOLIC BLOOD PRESSURE: 121 MMHG | TEMPERATURE: 97.8 F | BODY MASS INDEX: 31.72 KG/M2 | DIASTOLIC BLOOD PRESSURE: 82 MMHG | WEIGHT: 221.56 LBS | OXYGEN SATURATION: 99 % | HEIGHT: 70 IN | HEART RATE: 58 BPM | RESPIRATION RATE: 16 BRPM

## 2025-02-13 DIAGNOSIS — K83.1 BILIARY STRICTURE (H): Primary | ICD-10-CM

## 2025-02-13 LAB
ALBUMIN SERPL BCG-MCNC: 3.5 G/DL (ref 3.5–5.2)
ALP SERPL-CCNC: 222 U/L (ref 40–150)
ALT SERPL W P-5'-P-CCNC: 39 U/L (ref 0–70)
ANION GAP SERPL CALCULATED.3IONS-SCNC: 9 MMOL/L (ref 7–15)
AST SERPL W P-5'-P-CCNC: 33 U/L (ref 0–45)
BILIRUB SERPL-MCNC: 0.3 MG/DL
BUN SERPL-MCNC: 26.6 MG/DL (ref 6–20)
CALCIUM SERPL-MCNC: 9.2 MG/DL (ref 8.8–10.4)
CHLORIDE SERPL-SCNC: 109 MMOL/L (ref 98–107)
CREAT SERPL-MCNC: 1.37 MG/DL (ref 0.67–1.17)
EGFRCR SERPLBLD CKD-EPI 2021: 62 ML/MIN/1.73M2
ERCP: NORMAL
ERYTHROCYTE [DISTWIDTH] IN BLOOD BY AUTOMATED COUNT: 13.2 % (ref 10–15)
GLUCOSE BLDC GLUCOMTR-MCNC: 192 MG/DL (ref 70–99)
GLUCOSE SERPL-MCNC: 223 MG/DL (ref 70–99)
HCO3 SERPL-SCNC: 19 MMOL/L (ref 22–29)
HCT VFR BLD AUTO: 33.4 % (ref 40–53)
HGB BLD-MCNC: 11.4 G/DL (ref 13.3–17.7)
INR PPP: 1.02 (ref 0.85–1.15)
LIPASE SERPL-CCNC: 10 U/L (ref 13–60)
MCH RBC QN AUTO: 30.6 PG (ref 26.5–33)
MCHC RBC AUTO-ENTMCNC: 34.1 G/DL (ref 31.5–36.5)
MCV RBC AUTO: 90 FL (ref 78–100)
PLATELET # BLD AUTO: 148 10E3/UL (ref 150–450)
POTASSIUM SERPL-SCNC: 5.5 MMOL/L (ref 3.4–5.3)
PROT SERPL-MCNC: 6.2 G/DL (ref 6.4–8.3)
RBC # BLD AUTO: 3.72 10E6/UL (ref 4.4–5.9)
SODIUM SERPL-SCNC: 137 MMOL/L (ref 135–145)
WBC # BLD AUTO: 5.9 10E3/UL (ref 4–11)

## 2025-02-13 PROCEDURE — C1769 GUIDE WIRE: HCPCS | Performed by: INTERNAL MEDICINE

## 2025-02-13 PROCEDURE — 370N000017 HC ANESTHESIA TECHNICAL FEE, PER MIN: Performed by: INTERNAL MEDICINE

## 2025-02-13 PROCEDURE — 999N000141 HC STATISTIC PRE-PROCEDURE NURSING ASSESSMENT: Performed by: INTERNAL MEDICINE

## 2025-02-13 PROCEDURE — 258N000003 HC RX IP 258 OP 636: Performed by: STUDENT IN AN ORGANIZED HEALTH CARE EDUCATION/TRAINING PROGRAM

## 2025-02-13 PROCEDURE — 710N000010 HC RECOVERY PHASE 1, LEVEL 2, PER MIN: Performed by: INTERNAL MEDICINE

## 2025-02-13 PROCEDURE — 85610 PROTHROMBIN TIME: CPT | Performed by: INTERNAL MEDICINE

## 2025-02-13 PROCEDURE — 83690 ASSAY OF LIPASE: CPT | Performed by: INTERNAL MEDICINE

## 2025-02-13 PROCEDURE — 84295 ASSAY OF SERUM SODIUM: CPT | Performed by: INTERNAL MEDICINE

## 2025-02-13 PROCEDURE — 272N000001 HC OR GENERAL SUPPLY STERILE: Performed by: INTERNAL MEDICINE

## 2025-02-13 PROCEDURE — 85041 AUTOMATED RBC COUNT: CPT | Performed by: INTERNAL MEDICINE

## 2025-02-13 PROCEDURE — 999N000181 XR SURGERY CARM FLUORO GREATER THAN 5 MIN W STILLS

## 2025-02-13 PROCEDURE — 250N000011 HC RX IP 250 OP 636: Performed by: STUDENT IN AN ORGANIZED HEALTH CARE EDUCATION/TRAINING PROGRAM

## 2025-02-13 PROCEDURE — 250N000011 HC RX IP 250 OP 636: Performed by: REGISTERED NURSE

## 2025-02-13 PROCEDURE — 82962 GLUCOSE BLOOD TEST: CPT

## 2025-02-13 PROCEDURE — C1726 CATH, BAL DIL, NON-VASCULAR: HCPCS | Performed by: INTERNAL MEDICINE

## 2025-02-13 PROCEDURE — 36415 COLL VENOUS BLD VENIPUNCTURE: CPT | Performed by: INTERNAL MEDICINE

## 2025-02-13 PROCEDURE — 360N000082 HC SURGERY LEVEL 2 W/ FLUORO, PER MIN: Performed by: INTERNAL MEDICINE

## 2025-02-13 PROCEDURE — 255N000002 HC RX 255 OP 636: Performed by: INTERNAL MEDICINE

## 2025-02-13 PROCEDURE — 258N000003 HC RX IP 258 OP 636: Performed by: REGISTERED NURSE

## 2025-02-13 PROCEDURE — 250N000009 HC RX 250: Performed by: REGISTERED NURSE

## 2025-02-13 PROCEDURE — 710N000012 HC RECOVERY PHASE 2, PER MINUTE: Performed by: INTERNAL MEDICINE

## 2025-02-13 RX ORDER — DEXAMETHASONE SODIUM PHOSPHATE 4 MG/ML
INJECTION, SOLUTION INTRA-ARTICULAR; INTRALESIONAL; INTRAMUSCULAR; INTRAVENOUS; SOFT TISSUE PRN
Status: DISCONTINUED | OUTPATIENT
Start: 2025-02-13 | End: 2025-02-13

## 2025-02-13 RX ORDER — IOPAMIDOL 510 MG/ML
INJECTION, SOLUTION INTRAVASCULAR PRN
Status: DISCONTINUED | OUTPATIENT
Start: 2025-02-13 | End: 2025-02-13 | Stop reason: HOSPADM

## 2025-02-13 RX ORDER — PROPOFOL 10 MG/ML
INJECTION, EMULSION INTRAVENOUS PRN
Status: DISCONTINUED | OUTPATIENT
Start: 2025-02-13 | End: 2025-02-13

## 2025-02-13 RX ORDER — LIDOCAINE HYDROCHLORIDE 20 MG/ML
INJECTION, SOLUTION INFILTRATION; PERINEURAL PRN
Status: DISCONTINUED | OUTPATIENT
Start: 2025-02-13 | End: 2025-02-13

## 2025-02-13 RX ORDER — ONDANSETRON 2 MG/ML
4 INJECTION INTRAMUSCULAR; INTRAVENOUS EVERY 30 MIN PRN
Status: DISCONTINUED | OUTPATIENT
Start: 2025-02-13 | End: 2025-02-13 | Stop reason: HOSPADM

## 2025-02-13 RX ORDER — NALOXONE HYDROCHLORIDE 0.4 MG/ML
0.4 INJECTION, SOLUTION INTRAMUSCULAR; INTRAVENOUS; SUBCUTANEOUS
Status: DISCONTINUED | OUTPATIENT
Start: 2025-02-13 | End: 2025-02-13 | Stop reason: HOSPADM

## 2025-02-13 RX ORDER — FENTANYL CITRATE 50 UG/ML
INJECTION, SOLUTION INTRAMUSCULAR; INTRAVENOUS PRN
Status: DISCONTINUED | OUTPATIENT
Start: 2025-02-13 | End: 2025-02-13

## 2025-02-13 RX ORDER — NALOXONE HYDROCHLORIDE 0.4 MG/ML
0.2 INJECTION, SOLUTION INTRAMUSCULAR; INTRAVENOUS; SUBCUTANEOUS
Status: DISCONTINUED | OUTPATIENT
Start: 2025-02-13 | End: 2025-02-13 | Stop reason: HOSPADM

## 2025-02-13 RX ORDER — FENTANYL CITRATE 50 UG/ML
25 INJECTION, SOLUTION INTRAMUSCULAR; INTRAVENOUS EVERY 5 MIN PRN
Status: DISCONTINUED | OUTPATIENT
Start: 2025-02-13 | End: 2025-02-13 | Stop reason: HOSPADM

## 2025-02-13 RX ORDER — SODIUM CHLORIDE, SODIUM LACTATE, POTASSIUM CHLORIDE, CALCIUM CHLORIDE 600; 310; 30; 20 MG/100ML; MG/100ML; MG/100ML; MG/100ML
INJECTION, SOLUTION INTRAVENOUS CONTINUOUS
Status: DISCONTINUED | OUTPATIENT
Start: 2025-02-13 | End: 2025-02-13 | Stop reason: HOSPADM

## 2025-02-13 RX ORDER — ONDANSETRON 4 MG/1
4 TABLET, ORALLY DISINTEGRATING ORAL EVERY 6 HOURS PRN
Status: DISCONTINUED | OUTPATIENT
Start: 2025-02-13 | End: 2025-02-13 | Stop reason: HOSPADM

## 2025-02-13 RX ORDER — GABAPENTIN 300 MG/1
300 CAPSULE ORAL 2 TIMES DAILY
Status: ON HOLD | COMMUNITY
Start: 2024-12-24 | End: 2025-02-13

## 2025-02-13 RX ORDER — FLUMAZENIL 0.1 MG/ML
0.2 INJECTION, SOLUTION INTRAVENOUS
Status: DISCONTINUED | OUTPATIENT
Start: 2025-02-13 | End: 2025-02-13 | Stop reason: HOSPADM

## 2025-02-13 RX ORDER — HYDROMORPHONE HCL IN WATER/PF 6 MG/30 ML
0.2 PATIENT CONTROLLED ANALGESIA SYRINGE INTRAVENOUS EVERY 5 MIN PRN
Status: DISCONTINUED | OUTPATIENT
Start: 2025-02-13 | End: 2025-02-13 | Stop reason: HOSPADM

## 2025-02-13 RX ORDER — SODIUM CHLORIDE, SODIUM LACTATE, POTASSIUM CHLORIDE, CALCIUM CHLORIDE 600; 310; 30; 20 MG/100ML; MG/100ML; MG/100ML; MG/100ML
INJECTION, SOLUTION INTRAVENOUS CONTINUOUS PRN
Status: DISCONTINUED | OUTPATIENT
Start: 2025-02-13 | End: 2025-02-13

## 2025-02-13 RX ORDER — HYDROMORPHONE HCL IN WATER/PF 6 MG/30 ML
0.4 PATIENT CONTROLLED ANALGESIA SYRINGE INTRAVENOUS EVERY 5 MIN PRN
Status: DISCONTINUED | OUTPATIENT
Start: 2025-02-13 | End: 2025-02-13 | Stop reason: HOSPADM

## 2025-02-13 RX ORDER — PROCHLORPERAZINE MALEATE 5 MG/1
10 TABLET ORAL EVERY 6 HOURS PRN
Status: DISCONTINUED | OUTPATIENT
Start: 2025-02-13 | End: 2025-02-13 | Stop reason: HOSPADM

## 2025-02-13 RX ORDER — PREGABALIN 75 MG/1
75 CAPSULE ORAL 2 TIMES DAILY
Qty: 60 CAPSULE | Refills: 0 | Status: SHIPPED | OUTPATIENT
Start: 2025-02-13

## 2025-02-13 RX ORDER — PROPOFOL 10 MG/ML
INJECTION, EMULSION INTRAVENOUS CONTINUOUS PRN
Status: DISCONTINUED | OUTPATIENT
Start: 2025-02-13 | End: 2025-02-13

## 2025-02-13 RX ORDER — FENTANYL CITRATE 50 UG/ML
50 INJECTION, SOLUTION INTRAMUSCULAR; INTRAVENOUS EVERY 5 MIN PRN
Status: DISCONTINUED | OUTPATIENT
Start: 2025-02-13 | End: 2025-02-13 | Stop reason: HOSPADM

## 2025-02-13 RX ORDER — NALOXONE HYDROCHLORIDE 0.4 MG/ML
0.1 INJECTION, SOLUTION INTRAMUSCULAR; INTRAVENOUS; SUBCUTANEOUS
Status: DISCONTINUED | OUTPATIENT
Start: 2025-02-13 | End: 2025-02-13 | Stop reason: HOSPADM

## 2025-02-13 RX ORDER — ONDANSETRON 4 MG/1
4 TABLET, ORALLY DISINTEGRATING ORAL EVERY 30 MIN PRN
Status: DISCONTINUED | OUTPATIENT
Start: 2025-02-13 | End: 2025-02-13 | Stop reason: HOSPADM

## 2025-02-13 RX ORDER — ONDANSETRON 2 MG/ML
4 INJECTION INTRAMUSCULAR; INTRAVENOUS EVERY 6 HOURS PRN
Status: DISCONTINUED | OUTPATIENT
Start: 2025-02-13 | End: 2025-02-13 | Stop reason: HOSPADM

## 2025-02-13 RX ORDER — ONDANSETRON 2 MG/ML
INJECTION INTRAMUSCULAR; INTRAVENOUS PRN
Status: DISCONTINUED | OUTPATIENT
Start: 2025-02-13 | End: 2025-02-13

## 2025-02-13 RX ORDER — OXYCODONE HYDROCHLORIDE 5 MG/1
5 TABLET ORAL
Status: DISCONTINUED | OUTPATIENT
Start: 2025-02-13 | End: 2025-02-13 | Stop reason: HOSPADM

## 2025-02-13 RX ORDER — LIDOCAINE 40 MG/G
CREAM TOPICAL
Status: DISCONTINUED | OUTPATIENT
Start: 2025-02-13 | End: 2025-02-13 | Stop reason: HOSPADM

## 2025-02-13 RX ORDER — CIPROFLOXACIN 2 MG/ML
400 INJECTION, SOLUTION INTRAVENOUS ONCE
Status: COMPLETED | OUTPATIENT
Start: 2025-02-13 | End: 2025-02-13

## 2025-02-13 RX ORDER — CALCIUM CARBONATE/VITAMIN D3 600 MG-10
1 TABLET ORAL
COMMUNITY
Start: 2024-03-02

## 2025-02-13 RX ADMIN — CIPROFLOXACIN 400 MG: 400 INJECTION, SOLUTION INTRAVENOUS at 09:53

## 2025-02-13 RX ADMIN — Medication 50 MG: at 09:48

## 2025-02-13 RX ADMIN — PROPOFOL 125 MCG/KG/MIN: 10 INJECTION, EMULSION INTRAVENOUS at 09:49

## 2025-02-13 RX ADMIN — SODIUM CHLORIDE, POTASSIUM CHLORIDE, SODIUM LACTATE AND CALCIUM CHLORIDE: 600; 310; 30; 20 INJECTION, SOLUTION INTRAVENOUS at 09:41

## 2025-02-13 RX ADMIN — PROPOFOL 150 MG: 10 INJECTION, EMULSION INTRAVENOUS at 09:46

## 2025-02-13 RX ADMIN — ONDANSETRON 4 MG: 2 INJECTION, SOLUTION INTRAMUSCULAR; INTRAVENOUS at 10:44

## 2025-02-13 RX ADMIN — MIDAZOLAM 2 MG: 1 INJECTION INTRAMUSCULAR; INTRAVENOUS at 09:38

## 2025-02-13 RX ADMIN — FENTANYL CITRATE 100 MCG: 50 INJECTION INTRAMUSCULAR; INTRAVENOUS at 09:45

## 2025-02-13 RX ADMIN — DEXAMETHASONE SODIUM PHOSPHATE 6 MG: 4 INJECTION, SOLUTION INTRA-ARTICULAR; INTRALESIONAL; INTRAMUSCULAR; INTRAVENOUS; SOFT TISSUE at 10:03

## 2025-02-13 RX ADMIN — FOSAPREPITANT 150 MG: 150 INJECTION, POWDER, LYOPHILIZED, FOR SOLUTION INTRAVENOUS at 09:42

## 2025-02-13 RX ADMIN — PROPOFOL 50 MG: 10 INJECTION, EMULSION INTRAVENOUS at 09:47

## 2025-02-13 RX ADMIN — LIDOCAINE HYDROCHLORIDE 100 MG: 20 INJECTION, SOLUTION INFILTRATION; PERINEURAL at 09:45

## 2025-02-13 RX ADMIN — ONDANSETRON 4 MG: 2 INJECTION INTRAMUSCULAR; INTRAVENOUS at 10:17

## 2025-02-13 RX ADMIN — Medication 100 MG: at 10:21

## 2025-02-13 RX ADMIN — Medication 100 MG: at 10:17

## 2025-02-13 ASSESSMENT — ACTIVITIES OF DAILY LIVING (ADL)
ADLS_ACUITY_SCORE: 49
ADLS_ACUITY_SCORE: 50
ADLS_ACUITY_SCORE: 50
ADLS_ACUITY_SCORE: 49

## 2025-02-13 ASSESSMENT — ENCOUNTER SYMPTOMS
ORTHOPNEA: 0
SEIZURES: 0

## 2025-02-13 ASSESSMENT — COPD QUESTIONNAIRES: COPD: 0

## 2025-02-13 ASSESSMENT — LIFESTYLE VARIABLES: TOBACCO_USE: 1

## 2025-02-13 NOTE — OR NURSING
in phase 1. Notified anesthesia.     Per anesthesia, okay for pt to discharge and use insulin at home as normal.

## 2025-02-13 NOTE — BRIEF OP NOTE
[facility]    Brief Operative Note    Pre-operative diagnosis: Common bile duct stricture (H) [K83.1]  Post-operative diagnosis Same as pre-operative diagnosis    Procedure: ENDOSCOPIC RETROGRADE CHOLANGIOPANCREATOGRAPHY, WITH sludge removal, and stent removal, N/A - Mouth    Surgeon: Surgeons and Role:     * Will Martínez MD - Primary     * Tori Mcnulty MD - Fellow - Assisting  Anesthesia: General   Estimated Blood Loss: None    Drains: None  Specimens: * No specimens in log *    Findings:     - Previously placement biliary stent was in good position and removed by a snare  - Selective biliary cannulation through a choledochoduodenal fistula proximal to the major papilla   - Improved mild persistent anastomotic stricture with possible duct-to-duct mismatch   - Extraction of small amount of sludge from the common bile duct     Recommendations:   - Observe patient in PACU prior anticipated home discharge   - Clear liquid diet   - Advance diet as tolerated   - Monitor clinical condition and labs on stent free trial given the improvement of anastomotic stricture   - Follow with transplant team for further management   - If patient develops fever, chills, or worsening abdominal pain or pancreatitis before scheduled ERCP, will recommend patient to call us immediately for consideration of an earlier urgent ERCP   - The findings and recommendations were discussed with the patient.     Complications: None.  Implants:   Implant Name Type Inv. Item Serial No.  Lot No. LRB No. Used Action   STENT COTTON-MANDUJANO ([place]) NORMA SOF-FLEX [id number] - [id number] Stent STENT COTTON-MANDUJANO ([place]) NORMA SOF-FLEX [id number]  [employer] [id number] N/A 1 Explanted   STENT PINTO PANCREA FLEX [id number] W/O FLANGE SGL PIGTAIL - [id number] Stent STENT PINTO PANCREA FLEX [id number] W/O FLANGE SGL PIGTAIL  JIMENEZ [id number] N/A 1 Explanted

## 2025-02-13 NOTE — TELEPHONE ENCOUNTER
2/13/2025 9:57 AM     REFILL REQUEST:     This is a patient of mine. PDMP and Chart have been reviewed; refill request is appropriate.    Refilled for 30 day supply.  Script e-Prescribed to pharmacy    LESA Mendez, JOHN, FNP-C

## 2025-02-13 NOTE — ANESTHESIA POSTPROCEDURE EVALUATION
Patient: Mary Lopez    Procedure: Procedure(s):  ENDOSCOPIC RETROGRADE CHOLANGIOPANCREATOGRAPHY, WITH sludge removal, and stent removal       Anesthesia Type:  General    Note:  Disposition: Outpatient   Postop Pain Control: Uneventful            Sign Out: Well controlled pain   PONV: No   Neuro/Psych: Uneventful            Sign Out: Acceptable/Baseline neuro status   Airway/Respiratory: Uneventful            Sign Out: Acceptable/Baseline resp. status   CV/Hemodynamics: Uneventful            Sign Out: Acceptable CV status; No obvious hypovolemia; No obvious fluid overload   Other NRE: NONE   DID A NON-ROUTINE EVENT OCCUR? No           Last vitals:  Vitals Value Taken Time   /66 02/13/25 1045   Temp 36.5  C (97.7  F) 02/13/25 1030   Pulse 66 02/13/25 1045   Resp 11 02/13/25 1045   SpO2 98 % 02/13/25 1045   Vitals shown include unfiled device data.    Electronically Signed By: Jacquelin Gallego MD  February 13, 2025  10:46 AM

## 2025-02-13 NOTE — ANESTHESIA CARE TRANSFER NOTE
Patient: Mary Lopez    Procedure: Procedure(s):  ENDOSCOPIC RETROGRADE CHOLANGIOPANCREATOGRAPHY, WITH sludge removal, and stent removal       Diagnosis: Common bile duct stricture (H) [K83.1]  Diagnosis Additional Information: No value filed.    Anesthesia Type:   General     Note:    Oropharynx: oropharynx clear of all foreign objects  Level of Consciousness: awake  Oxygen Supplementation: nasal cannula  Level of Supplemental Oxygen (L/min / FiO2): 2  Independent Airway: airway patency satisfactory and stable  Dentition: dentition unchanged  Vital Signs Stable: post-procedure vital signs reviewed and stable  Report to RN Given: handoff report given  Patient transferred to: PACU    Handoff Report: Identifed the Patient, Identified the Reponsible Provider, Reviewed the pertinent medical history, Discussed the surgical course, Reviewed Intra-OP anesthesia mangement and issues during anesthesia, Set expectations for post-procedure period and Allowed opportunity for questions and acknowledgement of understanding      Vitals:  Vitals Value Taken Time   /68 02/13/25 1031   Temp 36.5  C (97.7  F) 02/13/25 1030   Pulse 70 02/13/25 1034   Resp 9 02/13/25 1034   SpO2 99 % 02/13/25 1034   Vitals shown include unfiled device data.    Electronically Signed By: LESA Carmona CRNA  February 13, 2025  10:35 AM

## 2025-02-13 NOTE — ANESTHESIA PREPROCEDURE EVALUATION
Pre-Operative H & P       HPI  Mary Lopez is a 54 year old male who presents for pre-operative H & P in preparation for  Procedure Information       Case: [id number] Date/Time: 02/13/25 0910    Procedure: ENDOSCOPIC RETROGRADE CHOLANGIOPANCREATOGRAPHY, WITH REPLACEMENT OF TUBE OR STENT (Mouth)    Anesthesia type: General    Diagnosis: Common bile duct stricture (H) [K83.1]    Pre-op diagnosis: Common bile duct stricture (H) [K83.1]    Location:  OR  /  OR    Providers: Will Martínez MD            Mary Lopez is a 54-year-old male with a history of alcoholic cirrhosis status post liver transplant 3/2024 complicated with anastomotic biliary stricture. He most recently underwent a ERCP with biliary dilation and stent placement on 12/17/2024. Now for repeat ERCP.     His past medical history is otherwise significant for hypertension, hyperlipidemia, mild pulmonary hypertension, tobacco use, anemia, alpha 1 antitrypsin deficiency, migraines, chronic pain syndrome, OA, diabetes mellitus, obesity, CKD, and alcohol use disorder in remission.    History is obtained from the patient, patient's wife, and chart review    Hx of abnormal bleeding or anti-platelet use: Denies.       Past Medical History  Past Medical History:   Diagnosis Date    ANGEL (acute kidney injury)     Alcoholic cirrhosis of liver without ascites (H) 07/13/2023    Alcoholic hepatitis with ascites (H) 10/03/2023    Alcoholic hepatitis without ascites (H) 07/13/2023    Chronic generalized pain disorder 12/25/2024    CKD stage 3a, GFR 45-59 ml/min (H) 08/08/2024    Closed fracture of one rib of left side 09/14/2023    Concussion without loss of consciousness 03/11/2020    Decompensated hepatic cirrhosis (H) 09/15/2023    Diabetes mellitus, type 2 (H) 11/22/2023    Essential hypertension 03/11/2020    Ganglion cyst of wrist, right 04/18/2024    History of biliary duct stent placement 09/05/2024    Latent autoimmune diabetes  mellitus in adult (CLAY) (H)     Liver replaced by transplant (H) 03/05/2024    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    ACR PRAJAPATI 6-7/9, treated with steroids    Mild hyperlipidemia 12/07/2021    Persistent insomnia 07/13/2023    Portal hypertension (H) 07/13/2023    Right inguinal hernia 08/08/2024    Scrotal abscess     Secondary esophageal 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 (H) 07/13/2023    Tobacco abuse disorder 03/11/2020    Type 2 diabetes mellitus with hyperglycemia (H) 12/22/2023       Past Surgical History  Past Surgical History:   Procedure Laterality Date    BENCH LIVER  3/5/2024    Procedure: Bench liver;  Surgeon: Ryder Cortez MD;  Location: [facility]    CHOLECYSTECTOMY      COLONOSCOPY N/A 1/2/2024    Procedure: COLONOSCOPY, WITH POLYPECTOMY;  Surgeon: Jak Urbina MD;  Location: [facility]    CV RIGHT HEART CATH MEASUREMENTS RECORDED N/A 1/30/2024    Procedure: Right Heart Catheterization;  Surgeon: Alfred Tafoya MD;  Location:  HEART CARDIAC CATH LAB    ENDOSCOPIC RETROGRADE CHOLANGIOPANCREATOGRAM N/A 10/22/2024    Procedure: ENDOSCOPIC RETROGRADE CHOLANGIOPANCREATOGRAPHY, WITH bile duct stent stent exchange and balloon dilation;  Surgeon: Naldo Rodriguez MD;  Location: [facility] OR    ENDOSCOPIC RETROGRADE CHOLANGIOPANCREATOGRAM N/A 12/17/2024    Procedure: ENDOSCOPIC RETROGRADE CHOLANGIOPANCREATOGRAPHY BILLIARY DILATION AND STENT PLACEMENT;  Surgeon: Will Martínez MD;  Location:  OR    ENDOSCOPIC RETROGRADE CHOLANGIOPANCREATOGRAPHY, EXCHANGE TUBE/STENT N/A 8/13/2024    Procedure: Endoscopic Retrograde Cholangiopancreatography with biliary sphincterotomy, balloon dilation, pancreatic stent placement,biliary stent exchange;  Surgeon: Naldo Rodriguez MD;  Location: [facility]    ENTEROSCOPY SMALL BOWEL N/A 7/12/2024    Procedure: Enteroscopy small bowel;  Surgeon: Hugo Daigle MD;  Location: [facility]    ESOPHAGOSCOPY, GASTROSCOPY, DUODENOSCOPY (EGD), COMBINED N/A 7/12/2024     Procedure: Esophagoscopy, gastroscopy, duodenoscopy (EGD), combined;  Surgeon: Hugo Daigle MD;  Location: [facility]    HERNIORRHAPHY INGUINAL Right 2024    Procedure: Open Inguinal Hernia Repair;  Surgeon: Ryder Cortez MD;  Location: [facility]    IR LIVER BIOPSY PERCUTANEOUS  3/15/2024    IR RETROPERITONEAL ABSCESS DRAINAGE  2024    TONSILLECTOMY      TRANSPLANT LIVER RECIPIENT  DONOR N/A 3/5/2024    Procedure: Transplant liver recipient  donor, bile duct stent placement;  Surgeon: Ryder Cortez MD;  Location: [facility] OR    VASECTOMY         Prior to Admission Medications  No current outpatient medications on file.       Allergies  Allergies   Allergen Reactions    Other Food Allergy Anaphylaxis     Legumes (black beans, baked beans, chickpeas)    Pineapple Itching       Social History  Social History     Socioeconomic History    Marital status:      Spouse name: Not on file    Number of children: Not on file    Years of education: Not on file    Highest education level: Not on file   Occupational History    Occupation: Not working now   Tobacco Use    Smoking status: Former     Types: Cigarettes     Passive exposure: Never    Smokeless tobacco: Current     Types: Chew     Last attempt to quit: 2004    Tobacco comments:     Chew daily 1/8 of a tin per day   Vaping Use    Vaping status: Never Used   Substance and Sexual Activity    Alcohol use: Not Currently     Alcohol/week: 12.0 standard drinks of alcohol     Types: 12 Standard drinks or equivalent per week     Comment: Sober since 2023    Drug use: Not Currently    Sexual activity: Yes     Partners: Female     Birth control/protection: Male Surgical   Other Topics Concern    Parent/sibling w/ CABG, MI or angioplasty before 65F 55M? No   Social History Narrative    Not on file     Social Drivers of Health     Financial Resource Strain: Low Risk  (2023)    Financial Resource Strain     Within the past 12  months, have you or your family members you live with been unable to get utilities (heat, electricity) when it was really needed?: No   Food Insecurity: No Food Insecurity (6/22/2024)    Received from [facility]    Hunger Vital Sign     Worried About Running Out of Food in the Last Year: Never true     Ran Out of Food in the Last Year: Never true   Transportation Needs: No Transportation Needs (6/22/2024)    Received from [facility]    PRAPARE - Transportation     Lack of Transportation (Medical): No     Lack of Transportation (Non-Medical): No   Physical Activity: Not on file   Stress: Not on file   Social Connections: Not on file   Interpersonal Safety: Low Risk  (12/17/2024)    Interpersonal Safety     Do you feel physically and emotionally safe where you currently live?: Yes     Within the past 12 months, have you been hit, slapped, kicked or otherwise physically hurt by someone?: No     Within the past 12 months, have you been humiliated or emotionally abused in other ways by your partner or ex-partner?: No   Housing Stability: Low Risk  (6/22/2024)    Received from [facility]    Housing Stability Vital Sign     Unable to Pay for Housing in the Last Year: No     Number of Times Moved in the Last Year: 0     Homeless in the Last Year: No       Family History  Family History   Problem Relation Age of Onset    Anxiety Disorder Mother     Depression Mother     Bipolar Disorder Mother     Chronic Obstructive Pulmonary Disease Mother     Lung Cancer Mother 81    Morbid Obesity Father     Diabetes Father     Diabetes Type 2  Brother     Substance Abuse Maternal Grandfather     Substance Abuse Paternal Grandfather     Colon Cancer No family hx of     Liver Disease No family hx of        Review of Systems  The complete review of systems is negative other than noted in the HPI or here.   Anesthesia Evaluation   Pt has had prior anesthetic.     History of anesthetic complications  - .  Patient  reports slow wake up and nausea following the last ERCP in December. He has not previously had these issues with prior surgeries..    ROS/MED HX  ENT/Pulmonary:     (+)                tobacco use (chewing), Current use,                    (-) asthma, COPD, sleep apnea and recent URI   Neurologic:     (+)      migraines,                       (-) no seizures and no CVA   Cardiovascular: Comment: - Mild pulmonary hypertension on RHC 1/30/24  - Minimal, non-obstructive CAD per CTA angio 1/19/24    (+) Dyslipidemia hypertension- -   -  - -                                 Previous cardiac testing   Echo: Date: 2/24/24 Results:  Interpretation Summary  No significant valvular abnormalities were noted. No vegetation or mass  identified.  ______________________________________________________________________________  Left Ventricle  Global and regional left ventricular function is normal with an EF of 60-65%.     Right Ventricle  Right ventricular function, chamber size, wall motion, and thickness are  normal.     Mitral Valve  The mitral valve is normal.     Aortic Valve  Aortic valve is normal in structure and function.     Tricuspid Valve  The tricuspid valve is normal.     Pulmonic Valve  The pulmonic valve is normal.    Stress Test:  Date: Results:    ECG Reviewed:  Date: 6/26/24 Results:  ST  Cath:  Date: 1/30/24 Results:  RHC Result Date: 1/30/2024     Right sided filling pressures are mildly elevated.    Left sided filling pressures are normal.    Mild elevated pulmonary hypertension.    Hyperdynamic cardiac output level. Minimally elevated PA pressures (mPA 20 mmHg) with minimally elevated right sided filling pressures. PVR < 1 DIAMOND High cardiac output by Naveed and Thermodilution due to cirrhosis. High output is causing slight elevation in PA pressures but there is no intrinsic pulmonary vascular disease or right heart dysfunction.    (-) MOE and orthopnea/PND   METS/Exercise Tolerance: >4 METS Comment: Able to walk x  10 mins continuously and ascend a flight of stairs without exertional symptoms.    Hematologic:     (+)      anemia, history of blood transfusion, no previous transfusion reaction, Known PRBC Anitbodies:No    (-) history of blood clots   Musculoskeletal:   (+)  arthritis (knees),             GI/Hepatic: Comment: - alcoholic cirrhosis s/p liver transplant 3/2024 complicated with anastomotic biliary stricture s/p ERCP with biliary dilation and stent placement on 12/17/24.   - alpha 1 antitrypsin deficiency  - H/o esophageal varices     (+) GERD, Asymptomatic on medication,           liver disease,       Renal/Genitourinary:     (+) renal disease, type: CRI, Pt does not require dialysis,           Endo:     (+)  type II DM, Last HgA1c: 7.8, date: 1/26/25, Using insulin,          Obesity,    (-) chronic steroid usage   Psychiatric/Substance Use:     (+)   alcohol abuse      Infectious Disease:     (+)    VRE,     (-) Recent Fever   Malignancy:  - neg malignancy ROS     Other:      (+)  , H/O Chronic Pain,              Prior Labs/Diagnostic Studies   All labs and imaging personally reviewed     Component      Latest Ref Rng 1/26/2025  9:20 AM   Sodium      135 - 145 mmol/L 136    Potassium      3.4 - 5.3 mmol/L 5.5 (H)    Chloride      98 - 107 mmol/L 108 (H)    Carbon Dioxide (CO2)      22 - 29 mmol/L 20 (L)    Anion Gap      7 - 15 mmol/L 8    Urea Nitrogen      6.0 - 20.0 mg/dL 47.6 (H)    Creatinine      0.67 - 1.17 mg/dL 1.81 (H)    GFR Estimate      >60 mL/min/1.73m2 44 (L)    Calcium      8.8 - 10.4 mg/dL 9.2    Glucose      70 - 99 mg/dL 221 (H)    WBC      4.0 - 11.0 10e3/uL 8.1    RBC Count      4.40 - 5.90 10e6/uL 4.10 (L)    Hemoglobin      13.3 - 17.7 g/dL 12.4 (L)    Hematocrit      40.0 - 53.0 % 37.2 (L)    MCV      78 - 100 fL 91    MCH      26.5 - 33.0 pg 30.2    MCHC      31.5 - 36.5 g/dL 33.3    RDW      10.0 - 15.0 % 14.0    Platelet Count      150 - 450 10e3/uL 203    Protein Total      6.4 - 8.3  g/dL 7.8    Albumin      3.5 - 5.2 g/dL 4.4    Bilirubin Total      <=1.2 mg/dL 0.4    Alkaline Phosphatase      40 - 150 U/L 274 (H)    AST      0 - 45 U/L 40    ALT      0 - 70 U/L 46    Bilirubin Direct      0.00 - 0.30 mg/dL <0.20       Legend:  (H) High  (L) Low    EKG/ stress test - if available please see in ROS above     CTA Angio 1/19/24:  IMPRESSION:  1.  Minimal non-obstructive coronary artery disease.  2.  Total Agatston score 7 placing the patient in the 60th percentile  when compared to age and gender matched control group.  3.  Please review Radiology report for incidental noncardiac findings  that will follow separately.    The patient's records and results personally reviewed by this provider.     Outside records reviewed from: Care Everywhere      Assessment    Mary Lopez is a 54 year old male seen as a PAC referral for risk assessment and optimization for anesthesia.    Plan/Recommendations  Pt will be optimized for the proposed procedure.  See below for details on the assessment, risk, and preoperative recommendations    NEUROLOGY  - No history of TIA, CVA or seizure  -Post Op delirium risk factors:  No risk identified    ENT  - No current airway concerns.  Will need to be reassessed day of surgery.  Mallampati: Unable to assess  TM: Unable to assess    CARDIAC  - H/o hypertension, not currently managed on medication.   - Minimal non-obstructive coronary artery disease per recent CTA angio.  Today the patient reports ability to achieve greater than 4 METS and denies any symptoms of chest pain, chest tightness, or shortness of breath.    - METS (Metabolic Equivalents)  Patient performs 4 or more METS exercise without symptoms             Total Score: 0      RCRI-Low risk: Class 2 0.9% complication rate             Total Score: 1    RCRI: Diabetes        PULMONARY  JAYLIN Low Risk             Total Score: 2    JAYLIN: Over 50 ys old    JAYLIN: Male      - Denies asthma or inhaler use  - Tobacco  "History    History   Smoking Status    Former    Types: Cigarettes   Smokeless Tobacco    Current    Types: Chew    Last attempt to quit: 1/1/2004       GI  - H/o GERD. Patient reports symptoms are well-controlled on omeprazole.   PONV Medium Risk  Total Score: 2           1 AN PONV: Patient is not a current smoker    1 AN PONV: Intended Post Op Opioids      - alcoholic cirrhosis s/p liver transplant 3/2024 complicated with anastomotic biliary stricture s/p ERCP with biliary dilation and stent placement on 12/17/24. See HPI - procedure planned as above.   Continue cellcept and cyclosporine on DOS.   - H/o esophageal varices     /RENAL  - CKD, baseline Creatinine  1.8    ENDOCRINE    - BMI: Estimated body mass index is 31.79 kg/m  as calculated from the following:    Height as of this encounter: 1.778 m (5' 10\").    Weight as of this encounter: 100.5 kg (221 lb 9 oz).  Obesity (BMI >30)  - Diabetes  Diabetes,  insulin dependent. Hold morning short acting insulin DOS. Take 80% of last scheduled dose of long-acting insulin prior to procedure.  Hold Jardiance x 3 days prior to surgery. Recommend close monitoring of the patient's blood glucose levels throughout the perioperative period.    HEME  VTE Low Risk 0.5%             Total Score: 2    VTE: Male      - No history of abnormal bleeding or antiplatelet use.  - Chronic anemia. Hgb on 1/26/25 was 12.4.   Recommend perioperative use of blood conservation techniques intraoperatively and close monitoring for postoperative bleeding.    MSK  Patient is NOT Frail             Total Score: 0        PSYCH  - H/o alcohol use disorder in remission since 6/2023.      Physical Exam    Airway        Mallampati: III   TM distance: > 3 FB   Neck ROM: full   Mouth opening: > 3 cm    Respiratory Devices and Support         Dental       (+) Minor Abnormalities - some fillings, tiny chips      Cardiovascular   cardiovascular exam normal       Rhythm and rate: regular and normal "     Pulmonary   pulmonary exam normal        breath sounds clear to auscultation           Anesthesia Plan    ASA Status:  3    NPO Status:  NPO Appropriate    Anesthesia Type: General.     - Airway: ETT   Induction: Intravenous, Propofol.   Maintenance: TIVA.   Techniques and Equipment:     - Lines/Monitors: BIS     Consents    Anesthesia Plan(s) and associated risks, benefits, and realistic alternatives discussed. Questions answered and patient/representative(s) expressed understanding.     - Discussed: Risks, Benefits and Alternatives for BOTH SEDATION and the PROCEDURE were discussed     - Discussed with:  Patient      - Extended Intubation/Ventilatory Support Discussed: No.      - Patient is DNR/DNI Status: No     Use of blood products discussed: No .     Postoperative Care    Pain management: IV analgesics, Oral pain medications, Multi-modal analgesia.   PONV prophylaxis: Ondansetron (or other 5HT-3), Background Propofol Infusion     Comments:    Other Comments: K+ 5.5 on last draw - will repeat today, TIVA due to PONV.

## 2025-02-13 NOTE — DISCHARGE INSTRUCTIONS
After Anesthesia (Sleep Medicine)  What should I do after anesthesia?  You should rest and relax for the next 24 hours. Avoid risky or difficult (strenuous) activity. A responsible adult should stay with you overnight.  Don't drive or use any heavy equipment for 24 hours. Even if you feel normal, your reactions may be affected by the sleep medicine given to you.  Don't drink alcohol or make any important decisions for 24 hours.  Slowly get back to your regular diet, as you feel able.  How should I expect to feel?  It's normal to feel dizzy, light-headed, or faint for up to a full day after anesthesia or while taking pain medicine. If this happens:   Sit down for a few minutes before standing.  Have someone help you when you get up to walk or use the bathroom.  If you have nausea (feel sick to your stomach) or vomit (throw up):   Drink clear liquids (such as apple juice, ginger ale, broth, or 7UP) until you feel better.  If you feel sick to your stomach, or you keep vomiting for 24 hours, please call the doctor.  What else should I know?  You might have a dry mouth, sore throat, muscle aches, or trouble sleeping. These should go away after 24 hours.  Please contact your doctor if you have any other symptoms that concern you, such as fever, pain, bleeding, fluid drainage, swelling, or headache, or if it's been over 8 to 10 hours and you still aren't able to pee (urinate).  If you have a history of sleep apnea, it's very important to use your CPAP machine for the next 24 hours when you nap or sleep.   For informational purposes only. Not to replace the advice of your health care provider. Copyright   2023 [employer]. All rights reserved. Clinically reviewed by Nael Riojas MD. [employer] [id number] - REV 09/23.  Contacting your Doctor -   To contact a doctor, call Dr. Martínez [phone] or:  [phone] and ask for the resident on call for Gastroenterology (answered 24 hours a day)   Emergency  Department:  [place]: [phone]  [place]: [phone] 911 if you are in need of immediate or emergent help

## 2025-02-13 NOTE — PROGRESS NOTES
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 Post- op course was complicated by early acute cellular rejection. EGD to remove stent observed complete intraductal location of surgical bilairy stent, confimed by CT. ERCP 8/13/2024 w access bile duct, sphincterotomy, removal of surgical stent and placement of new stent for limited stenosis. ERCP 10/20/2024 with minimal stenosis at anastamosis, balloon dilated to 8 mm, single 4F x 11 cm fallout stent placed. ERCP 12/17/24 revealed mild anastomotic stricture s/p dilation to 6 mm and placement of 10 Fr x 12 cm SF.     Ref: JOSTIN MONTEZ MD, CLARENCE VALLES MD

## 2025-02-13 NOTE — ANESTHESIA PROCEDURE NOTES
Airway         Procedure Start/Stop Times: 2/13/2025 9:52 AM  Staff -        Anesthesiologist:  Jacquelin Gallego MD       Performed By: anesthesiologist and other anesthesia staff  Consent for Airway        Urgency: elective  Indications and Patient Condition       Indications for airway management: doreen-procedural       Induction type:intravenous       Mask difficulty assessment: 2 - vent by mask + OA or adjuvant +/- NMBA    Final Airway Details       Final airway type: endotracheal airway       Successful airway: ETT - single  Endotracheal Airway Details        ETT size (mm): 7.5       Cuffed: yes       Successful intubation technique: video laryngoscopy       Grade View of Cords: 1       Adjucts: stylet       Position: Right       Measured from: lips       Secured at (cm): 23       Bite Block used: green GI bite block.    Post intubation assessment        Placement verified by: capnometry, equal breath sounds and chest rise        Number of attempts at approach: 1       Number of other approaches attempted: 0       Secured with: tape       Ease of procedure: easy       Dentition: Intact and Unchanged    Medication(s) Administered   Medication Administration Time: 2/13/2025 9:52 AM

## 2025-02-17 ENCOUNTER — ANCILLARY PROCEDURE (OUTPATIENT)
Dept: CT IMAGING | Facility: CLINIC | Age: 55
End: 2025-02-17
Attending: INTERNAL MEDICINE
Payer: COMMERCIAL

## 2025-02-17 ENCOUNTER — OFFICE VISIT (OUTPATIENT)
Dept: PEDIATRICS | Facility: CLINIC | Age: 55
End: 2025-02-17
Payer: COMMERCIAL

## 2025-02-17 ENCOUNTER — TELEPHONE (OUTPATIENT)
Dept: TRANSPLANT | Facility: CLINIC | Age: 55
End: 2025-02-17

## 2025-02-17 VITALS
HEIGHT: 70 IN | WEIGHT: 221 LBS | TEMPERATURE: 97.2 F | RESPIRATION RATE: 16 BRPM | SYSTOLIC BLOOD PRESSURE: 115 MMHG | HEART RATE: 86 BPM | BODY MASS INDEX: 31.64 KG/M2 | OXYGEN SATURATION: 97 % | DIASTOLIC BLOOD PRESSURE: 74 MMHG

## 2025-02-17 DIAGNOSIS — R10.31 GROIN PAIN, RIGHT: ICD-10-CM

## 2025-02-17 DIAGNOSIS — R10.31 GROIN PAIN, RIGHT: Primary | ICD-10-CM

## 2025-02-17 PROCEDURE — 99214 OFFICE O/P EST MOD 30 MIN: CPT | Performed by: INTERNAL MEDICINE

## 2025-02-17 PROCEDURE — 74177 CT ABD & PELVIS W/CONTRAST: CPT | Performed by: RADIOLOGY

## 2025-02-17 RX ORDER — IOPAMIDOL 755 MG/ML
122 INJECTION, SOLUTION INTRAVASCULAR ONCE
Status: COMPLETED | OUTPATIENT
Start: 2025-02-17 | End: 2025-02-17

## 2025-02-17 RX ADMIN — IOPAMIDOL 122 ML: 755 INJECTION, SOLUTION INTRAVASCULAR at 11:09

## 2025-02-17 NOTE — TELEPHONE ENCOUNTER
Call to Mary to check in. He is 11 mos post liver transplant.  No Answer, called Adina.    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 Most recent ERCP was done on 2/13, now with stent free trial. .  Labs 2/13 were ok, alk phos down to 222.  ALT / AST normal. Creat 1.37.      Call to Adina who shares that Mary is feeling like something is pulling in his groin, [facility], they sent him for CT.  He is being followed by a pain clinic - he is now getting pregabalin which is much better for him than neurontin - his hands were hurting really bad when he comes in from the cold - aching stays for hours.  He is eating well, having some hesitancy w/ urine - is on flomax.    Will be getting labs on 3/24.  They are going to [place] on Wednesday where Mary's daughter is.  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  It's will be Mary's 1 year anniversary next month.  Told Adina if labs look good next week he can decreases to every 2 mo labs.

## 2025-02-17 NOTE — PATIENT INSTRUCTIONS
Your CT did not show any sign of mass, infection, or hernia in the area where you are having pain.

## 2025-02-17 NOTE — PROGRESS NOTES
[facility] Visit    Assessment & Plan     Groin pain, right  In patient with history of liver transplant and hernia. No palpable mass on exam. No fevers to suggest infection. Differntial incluedes mass, hernia, less likjely inflamatory process. CT reassuring. Most likely muscle strain. Recommend conservative tx at home.    - CT Abdomen Pelvis w Contrast; Future  - sodium chloride (PF) 0.9% PF flush 3 mL    30 minutes were spent doing chart review, history and exam, documentation and further activities per the note.             Return in about 4 weeks (around 3/17/2025), or if symptoms worsen or fail to improve, for follow up with PCP.    Elmo Hernandez is a 54 year old, presenting for the following health issues:  Groin Pain (3 weeks )    Right sided groin pain for the past few weeks. Slightly worse with activity. No decrease in appetite. No change in pain with eating. No notable change in pain with Bms or straining.    Did have change in urinary stream and started on flomas on about the same timeline. No blood in urine. No dysuria    He is s/p liver transplant in 3/2024    HPI     Genitourinary - Male  Onset/Duration: 3 Weeks   Description:   Dysuria (painful urination): No}  Hematuria (blood in urine): No  Frequency: No  Waking at night to urinate: No  Hesitancy (delay in urine): YES  Retention (unable to empty): YES  Decrease in urinary flow: YES  Incontinence: YES  Progression of Symptoms:  same  Accompanying Signs & Symptoms:  Fever: No  Back/Flank pain: No  Urethral discharge: No  Testicle lumps/masses/pain: No  Nausea and/or vomiting: No  Abdominal pain: No  History:   History of frequent UTI s: No  History of kidney stones: No  History of hernias: Yes - repaired   Personal or Family history of Prostate problems: No  Sexually active: No  Precipitating or alleviating factors: None  Therapies tried and outcome: none            Objective    /74 (BP Location: Right arm, Patient  "Position: Sitting, Cuff Size: Adult Large)   Pulse 86   Temp 97.2  F (36.2  C) (Oral)   Resp 16   Ht 1.778 m (5' 10\")   Wt 100.2 kg (221 lb)   SpO2 97%   BMI 31.71 kg/m    There is no height or weight on file to calculate BMI.  Physical Exam     Well appearing  RRR  CTAB  Abd soft. TTP over right inguinal fold            Signed Electronically by: Dianne Avery MD    "

## 2025-02-24 ENCOUNTER — LAB (OUTPATIENT)
Dept: LAB | Facility: CLINIC | Age: 55
End: 2025-02-24
Payer: COMMERCIAL

## 2025-02-24 DIAGNOSIS — K70.11 ALCOHOLIC HEPATITIS WITH ASCITES (H): Chronic | ICD-10-CM

## 2025-02-24 DIAGNOSIS — Z94.4 LIVER REPLACED BY TRANSPLANT (H): Chronic | ICD-10-CM

## 2025-02-24 DIAGNOSIS — K83.1 BILIARY STRICTURE (H): ICD-10-CM

## 2025-02-24 LAB
ALBUMIN SERPL BCG-MCNC: 4 G/DL (ref 3.5–5.2)
ALP SERPL-CCNC: 223 U/L (ref 40–150)
ALT SERPL W P-5'-P-CCNC: 43 U/L (ref 0–70)
ANION GAP SERPL CALCULATED.3IONS-SCNC: 8 MMOL/L (ref 7–15)
AST SERPL W P-5'-P-CCNC: 34 U/L (ref 0–45)
BILIRUB DIRECT SERPL-MCNC: 0.12 MG/DL (ref 0–0.3)
BILIRUB SERPL-MCNC: 0.3 MG/DL
BUN SERPL-MCNC: 39.1 MG/DL (ref 6–20)
CALCIUM SERPL-MCNC: 9.4 MG/DL (ref 8.8–10.4)
CHLORIDE SERPL-SCNC: 109 MMOL/L (ref 98–107)
CREAT SERPL-MCNC: 1.89 MG/DL (ref 0.67–1.17)
CYCLOSPORINE BLD LC/MS/MS-MCNC: 70 UG/L (ref 50–400)
EGFRCR SERPLBLD CKD-EPI 2021: 42 ML/MIN/1.73M2
ERYTHROCYTE [DISTWIDTH] IN BLOOD BY AUTOMATED COUNT: 13 % (ref 10–15)
GLUCOSE SERPL-MCNC: 244 MG/DL (ref 70–99)
HCO3 SERPL-SCNC: 22 MMOL/L (ref 22–29)
HCT VFR BLD AUTO: 38.4 % (ref 40–53)
HGB BLD-MCNC: 12.8 G/DL (ref 13.3–17.7)
MAGNESIUM SERPL-MCNC: 2.1 MG/DL (ref 1.7–2.3)
MCH RBC QN AUTO: 29.9 PG (ref 26.5–33)
MCHC RBC AUTO-ENTMCNC: 33.3 G/DL (ref 31.5–36.5)
MCV RBC AUTO: 90 FL (ref 78–100)
PHOSPHATE SERPL-MCNC: 4.5 MG/DL (ref 2.5–4.5)
PLATELET # BLD AUTO: 183 10E3/UL (ref 150–450)
POTASSIUM SERPL-SCNC: 5.8 MMOL/L (ref 3.4–5.3)
PROT SERPL-MCNC: 6.9 G/DL (ref 6.4–8.3)
RBC # BLD AUTO: 4.28 10E6/UL (ref 4.4–5.9)
SODIUM SERPL-SCNC: 139 MMOL/L (ref 135–145)
TME LAST DOSE: NORMAL H
TME LAST DOSE: NORMAL H
WBC # BLD AUTO: 7.5 10E3/UL (ref 4–11)

## 2025-02-24 PROCEDURE — 84100 ASSAY OF PHOSPHORUS: CPT

## 2025-02-24 PROCEDURE — 80158 DRUG ASSAY CYCLOSPORINE: CPT

## 2025-02-24 PROCEDURE — 85027 COMPLETE CBC AUTOMATED: CPT

## 2025-02-24 PROCEDURE — 99000 SPECIMEN HANDLING OFFICE-LAB: CPT

## 2025-02-24 PROCEDURE — 80053 COMPREHEN METABOLIC PANEL: CPT

## 2025-02-24 PROCEDURE — G0480 DRUG TEST DEF 1-7 CLASSES: HCPCS | Mod: 90

## 2025-02-24 PROCEDURE — 83735 ASSAY OF MAGNESIUM: CPT

## 2025-02-24 PROCEDURE — 82248 BILIRUBIN DIRECT: CPT

## 2025-02-24 PROCEDURE — 36415 COLL VENOUS BLD VENIPUNCTURE: CPT

## 2025-02-25 ENCOUNTER — TELEPHONE (OUTPATIENT)
Dept: TRANSPLANT | Facility: CLINIC | Age: 55
End: 2025-02-25
Payer: COMMERCIAL

## 2025-02-25 DIAGNOSIS — Z94.4 LIVER REPLACED BY TRANSPLANT (H): Chronic | ICD-10-CM

## 2025-02-25 DIAGNOSIS — Z94.4 S/P LIVER TRANSPLANT (H): ICD-10-CM

## 2025-02-25 RX ORDER — CYCLOSPORINE 100 MG/1
200 CAPSULE, LIQUID FILLED ORAL EVERY 12 HOURS
Qty: 120 CAPSULE | Refills: 11 | Status: SHIPPED | OUTPATIENT
Start: 2025-02-25

## 2025-02-25 RX ORDER — CYCLOSPORINE 25 MG/1
25 CAPSULE, LIQUID FILLED ORAL 2 TIMES DAILY PRN
Status: SHIPPED
Start: 2025-02-25

## 2025-02-25 NOTE — TELEPHONE ENCOUNTER
ISSUE:   Cyclosporine level 70 on 1/24, goal 100-150, dose unclear in EPIC.      PLAN:   Call Patient and confirm this was an accurate 12-hour trough.   Verify Cyclosporine dose.  Confirm no new medications or or missed doses.   Confirm no new illness / infection / diarrhea.   If accurate trough and is taking 150 bid please increase to 175 bid.  If taking 175 bid please increase to 200 mg po bid.  Yes, I did see her message that he totally missed his dose on Friday, this would not hav impacted his level on Monday.     Repeat labs in 2 weeks.  At that point his CSA goal will change as he will be 1 year post.  Lore Mckeon, RN     OUTCOME:   Spoke with Rosio, they confirm accurate trough level and current dose 175 mg BID.   Rosio confirmed dose change to 200 mg BID.  Rosio agreed to repeat labs in 2 weeks.   Orders sent to [facility] pharmacy for dose change and lab for repeat labs.   Rosio voiced understanding of plan.      Valentina Enamorado LPN

## 2025-03-03 ENCOUNTER — TELEPHONE (OUTPATIENT)
Dept: TRANSPLANT | Facility: CLINIC | Age: 55
End: 2025-03-03
Payer: COMMERCIAL

## 2025-03-05 ENCOUNTER — MYC MEDICAL ADVICE (OUTPATIENT)
Dept: PALLIATIVE MEDICINE | Facility: CLINIC | Age: 55
End: 2025-03-05
Payer: COMMERCIAL

## 2025-03-05 DIAGNOSIS — G89.28 OTHER CHRONIC POSTPROCEDURAL PAIN: ICD-10-CM

## 2025-03-05 DIAGNOSIS — R10.84 ABDOMINAL PAIN, GENERALIZED: ICD-10-CM

## 2025-03-05 NOTE — TELEPHONE ENCOUNTER
Requesting return to Pregabalin 75mg BID due to increased pain when decreasing dose of pregabalin to 50mg / 75mg.     Routing to provider to review.     Evelyn Marcelino RN

## 2025-03-06 ENCOUNTER — MYC REFILL (OUTPATIENT)
Dept: EDUCATION SERVICES | Facility: CLINIC | Age: 55
End: 2025-03-06
Payer: COMMERCIAL

## 2025-03-06 ENCOUNTER — MYC REFILL (OUTPATIENT)
Dept: FAMILY MEDICINE | Facility: CLINIC | Age: 55
End: 2025-03-06
Payer: COMMERCIAL

## 2025-03-06 DIAGNOSIS — Z94.4 LIVER REPLACED BY TRANSPLANT (H): Chronic | ICD-10-CM

## 2025-03-06 DIAGNOSIS — K70.10 ALCOHOLIC HEPATITIS WITHOUT ASCITES (H): ICD-10-CM

## 2025-03-06 DIAGNOSIS — E13.9 LADA (LATENT AUTOIMMUNE DIABETES IN ADULTS), MANAGED AS TYPE 2 (H): ICD-10-CM

## 2025-03-06 DIAGNOSIS — K70.30 ALCOHOLIC CIRRHOSIS OF LIVER WITHOUT ASCITES (H): ICD-10-CM

## 2025-03-06 RX ORDER — ACYCLOVIR 400 MG/1
TABLET ORAL
Qty: 3 EACH | Refills: 5 | OUTPATIENT
Start: 2025-03-06

## 2025-03-06 RX ORDER — MULTIPLE VITAMINS W/ MINERALS TAB 9MG-400MCG
1 TAB ORAL DAILY
Qty: 90 TABLET | Refills: 3 | OUTPATIENT
Start: 2025-03-06

## 2025-03-07 ENCOUNTER — MYC MEDICAL ADVICE (OUTPATIENT)
Dept: FAMILY MEDICINE | Facility: CLINIC | Age: 55
End: 2025-03-07
Payer: COMMERCIAL

## 2025-03-07 DIAGNOSIS — E13.9 LADA (LATENT AUTOIMMUNE DIABETES IN ADULTS), MANAGED AS TYPE 2 (H): ICD-10-CM

## 2025-03-07 DIAGNOSIS — R52 PAIN: ICD-10-CM

## 2025-03-07 DIAGNOSIS — K70.10 ALCOHOLIC HEPATITIS WITHOUT ASCITES (H): ICD-10-CM

## 2025-03-07 DIAGNOSIS — K70.30 ALCOHOLIC CIRRHOSIS OF LIVER WITHOUT ASCITES (H): ICD-10-CM

## 2025-03-07 NOTE — TELEPHONE ENCOUNTER
Patient is requesting refills on Dexcom sensor and multivitamin.     BRIDGET Smith  [facility]

## 2025-03-08 RX ORDER — MULTIPLE VITAMINS W/ MINERALS TAB 9MG-400MCG
1 TAB ORAL DAILY
Qty: 90 TABLET | Refills: 3 | Status: SHIPPED | OUTPATIENT
Start: 2025-03-08

## 2025-03-08 RX ORDER — ACYCLOVIR 400 MG/1
TABLET ORAL
Qty: 9 EACH | Refills: 3 | Status: SHIPPED | OUTPATIENT
Start: 2025-03-08

## 2025-03-10 ENCOUNTER — LAB (OUTPATIENT)
Dept: LAB | Facility: CLINIC | Age: 55
End: 2025-03-10
Payer: COMMERCIAL

## 2025-03-10 ENCOUNTER — TELEPHONE (OUTPATIENT)
Dept: ENDOCRINOLOGY | Facility: CLINIC | Age: 55
End: 2025-03-10

## 2025-03-10 DIAGNOSIS — D70.9 NEUTROPENIA: ICD-10-CM

## 2025-03-10 DIAGNOSIS — Z94.4 LIVER REPLACED BY TRANSPLANT (H): ICD-10-CM

## 2025-03-10 LAB
ALBUMIN SERPL BCG-MCNC: 3.9 G/DL (ref 3.5–5.2)
ALP SERPL-CCNC: 238 U/L (ref 40–150)
ALT SERPL W P-5'-P-CCNC: 26 U/L (ref 0–70)
ANION GAP SERPL CALCULATED.3IONS-SCNC: 9 MMOL/L (ref 7–15)
AST SERPL W P-5'-P-CCNC: 28 U/L (ref 0–45)
BASOPHILS # BLD AUTO: 0 10E3/UL (ref 0–0.2)
BASOPHILS NFR BLD AUTO: 1 %
BILIRUB DIRECT SERPL-MCNC: 0.15 MG/DL (ref 0–0.3)
BILIRUB SERPL-MCNC: 0.3 MG/DL
BUN SERPL-MCNC: 31.1 MG/DL (ref 6–20)
CALCIUM SERPL-MCNC: 10.1 MG/DL (ref 8.8–10.4)
CHLORIDE SERPL-SCNC: 103 MMOL/L (ref 98–107)
CREAT SERPL-MCNC: 1.79 MG/DL (ref 0.67–1.17)
CYCLOSPORINE BLD LC/MS/MS-MCNC: 126 UG/L (ref 50–400)
EGFRCR SERPLBLD CKD-EPI 2021: 45 ML/MIN/1.73M2
EOSINOPHIL # BLD AUTO: 0.5 10E3/UL (ref 0–0.7)
EOSINOPHIL NFR BLD AUTO: 8 %
ERYTHROCYTE [DISTWIDTH] IN BLOOD BY AUTOMATED COUNT: 13.1 % (ref 10–15)
GLUCOSE SERPL-MCNC: 260 MG/DL (ref 70–99)
HCO3 SERPL-SCNC: 27 MMOL/L (ref 22–29)
HCT VFR BLD AUTO: 37.8 % (ref 40–53)
HGB BLD-MCNC: 12.5 G/DL (ref 13.3–17.7)
IMM GRANULOCYTES # BLD: 0 10E3/UL
IMM GRANULOCYTES NFR BLD: 0 %
LYMPHOCYTES # BLD AUTO: 1.2 10E3/UL (ref 0.8–5.3)
LYMPHOCYTES NFR BLD AUTO: 19 %
MAGNESIUM SERPL-MCNC: 1.9 MG/DL (ref 1.7–2.3)
MCH RBC QN AUTO: 30.3 PG (ref 26.5–33)
MCHC RBC AUTO-ENTMCNC: 33.1 G/DL (ref 31.5–36.5)
MCV RBC AUTO: 92 FL (ref 78–100)
MONOCYTES # BLD AUTO: 0.4 10E3/UL (ref 0–1.3)
MONOCYTES NFR BLD AUTO: 7 %
NEUTROPHILS # BLD AUTO: 4.1 10E3/UL (ref 1.6–8.3)
NEUTROPHILS NFR BLD AUTO: 66 %
PHOSPHATE SERPL-MCNC: 3.9 MG/DL (ref 2.5–4.5)
PLATELET # BLD AUTO: 161 10E3/UL (ref 150–450)
POTASSIUM SERPL-SCNC: 5.2 MMOL/L (ref 3.4–5.3)
PROT SERPL-MCNC: 7 G/DL (ref 6.4–8.3)
RBC # BLD AUTO: 4.13 10E6/UL (ref 4.4–5.9)
SODIUM SERPL-SCNC: 139 MMOL/L (ref 135–145)
TME LAST DOSE: NORMAL H
TME LAST DOSE: NORMAL H
WBC # BLD AUTO: 6.3 10E3/UL (ref 4–11)
WBC # BLD AUTO: 6.3 10E3/UL (ref 4–11)

## 2025-03-10 PROCEDURE — 80158 DRUG ASSAY CYCLOSPORINE: CPT

## 2025-03-10 PROCEDURE — 83735 ASSAY OF MAGNESIUM: CPT

## 2025-03-10 PROCEDURE — 82248 BILIRUBIN DIRECT: CPT

## 2025-03-10 PROCEDURE — 36415 COLL VENOUS BLD VENIPUNCTURE: CPT

## 2025-03-10 PROCEDURE — 85025 COMPLETE CBC W/AUTO DIFF WBC: CPT

## 2025-03-10 PROCEDURE — 84100 ASSAY OF PHOSPHORUS: CPT

## 2025-03-10 PROCEDURE — 80053 COMPREHEN METABOLIC PANEL: CPT

## 2025-03-10 NOTE — TELEPHONE ENCOUNTER
Patient's wife confirmed scheduled appointment:  Date: 5/15/25  Time: 2:30 pm  Visit type: RETURN DIABETES  Provider: Geovanna Ferreira PA-C  Location: [facility]  Testing/imaging:   Additional notes:  Spoke to pt's wife and honorio appt on 3/13 to next avail 5/20 virtual due to changes in the providers schedule.  DAVID Montana'd by Brooke.       Home Dewey on 3/10/2025 at 3:35 PM     Ambulatory

## 2025-03-13 ENCOUNTER — VIRTUAL VISIT (OUTPATIENT)
Dept: UROLOGY | Facility: CLINIC | Age: 55
End: 2025-03-13
Payer: COMMERCIAL

## 2025-03-13 ENCOUNTER — PATIENT OUTREACH (OUTPATIENT)
Dept: CARE COORDINATION | Facility: CLINIC | Age: 55
End: 2025-03-13

## 2025-03-13 DIAGNOSIS — R39.11 HESITANCY OF MICTURITION: Primary | ICD-10-CM

## 2025-03-13 NOTE — PROGRESS NOTES
Mary is a 54 year old who is being evaluated via a billable video visit.    How would you like to obtain your AVS? MyChart  If the video visit is dropped, the invitation should be resent by: Text to cell phone: [phone]  Will anyone else be joining your video visit? No          Subjective   Mary is a 54 year old, presenting for the following health issues:  No chief complaint on file.    HPI      Patient is a 54 y.o. male who was requested to be seen by Dr. Hoyt for consultation with regard patient's urinary complaints.   For the last several months, he complaints of hesitancy with urination.  He was placed on flomax without any help.  He has no dysuria/hematuria.  He has h/o liver tx.  He also has h/o DM and is on Jardiance.      Review of Systems  Constitutional, neuro, ENT, endocrine, pulmonary, cardiac, gastrointestinal, genitourinary, musculoskeletal, integument and psychiatric systems are negative, except as otherwise noted.      Objective           Vitals:  No vitals were obtained today due to virtual visit.    Physical Exam   GENERAL: alert and no distress  EYES: Eyes grossly normal to inspection.  No discharge or erythema, or obvious scleral/conjunctival abnormalities.  RESP: No audible wheeze, cough, or visible cyanosis.    SKIN: Visible skin clear. No significant rash, abnormal pigmentation or lesions.  NEURO: Cranial nerves grossly intact.  Mentation and speech appropriate for age.  PSYCH: Appropriate affect, tone, and pace of words    Urinary hesitancy:  schedule patient to rtc for cysto/uroflow/bladder scan next.      Video-Visit Details    Type of service:  Video Visit   Originating Location (pt. Location): Home    Distant Location (provider location):  Off-site  Platform used for Video Visit: Doximsonali  Signed Electronically by: Jak Levin MD

## 2025-03-17 RX ORDER — PREGABALIN 75 MG/1
75 CAPSULE ORAL 2 TIMES DAILY
Qty: 60 CAPSULE | Refills: 1 | Status: SHIPPED | OUTPATIENT
Start: 2025-03-17

## 2025-03-17 NOTE — TELEPHONE ENCOUNTER
3/17/2025 9:08 AM     REFILL REQUEST:     This is a patient of mine. PDMP and Chart have been reviewed; refill request is appropriate.    Refilled for 60 day supply.  Script e-Prescribed to pharmacy    LESA Mendez, JOHN, FNP-C

## 2025-03-18 ENCOUNTER — MYC MEDICAL ADVICE (OUTPATIENT)
Dept: PALLIATIVE MEDICINE | Facility: CLINIC | Age: 55
End: 2025-03-18

## 2025-03-18 ENCOUNTER — OFFICE VISIT (OUTPATIENT)
Dept: FAMILY MEDICINE | Facility: CLINIC | Age: 55
End: 2025-03-18
Payer: COMMERCIAL

## 2025-03-18 VITALS
HEIGHT: 70 IN | HEART RATE: 62 BPM | RESPIRATION RATE: 12 BRPM | TEMPERATURE: 98.3 F | DIASTOLIC BLOOD PRESSURE: 80 MMHG | BODY MASS INDEX: 33.07 KG/M2 | SYSTOLIC BLOOD PRESSURE: 133 MMHG | WEIGHT: 231 LBS | OXYGEN SATURATION: 98 %

## 2025-03-18 DIAGNOSIS — G89.29 CHRONIC GROIN PAIN, RIGHT: Primary | ICD-10-CM

## 2025-03-18 DIAGNOSIS — R52 CHRONIC GENERALIZED PAIN DISORDER: ICD-10-CM

## 2025-03-18 DIAGNOSIS — Z94.4 LIVER REPLACED BY TRANSPLANT (H): Chronic | ICD-10-CM

## 2025-03-18 DIAGNOSIS — R10.31 CHRONIC GROIN PAIN, RIGHT: Primary | ICD-10-CM

## 2025-03-18 DIAGNOSIS — G89.29 CHRONIC GENERALIZED PAIN DISORDER: ICD-10-CM

## 2025-03-18 PROBLEM — N18.31 CKD STAGE 3A, GFR 45-59 ML/MIN (H): Status: RESOLVED | Noted: 2024-08-08 | Resolved: 2025-03-18

## 2025-03-18 PROBLEM — K40.90 INGUINAL HERNIA: Status: RESOLVED | Noted: 2024-09-18 | Resolved: 2025-03-18

## 2025-03-18 PROCEDURE — 3075F SYST BP GE 130 - 139MM HG: CPT | Performed by: INTERNAL MEDICINE

## 2025-03-18 PROCEDURE — G2211 COMPLEX E/M VISIT ADD ON: HCPCS | Performed by: INTERNAL MEDICINE

## 2025-03-18 PROCEDURE — 99214 OFFICE O/P EST MOD 30 MIN: CPT | Performed by: INTERNAL MEDICINE

## 2025-03-18 PROCEDURE — 3079F DIAST BP 80-89 MM HG: CPT | Performed by: INTERNAL MEDICINE

## 2025-03-18 NOTE — PROGRESS NOTES
Assessment & Plan     1.  Chronic groin pain right side of times at least 3 months or more.  S/p right inguinal hernia raphe 9/18/2024.  Negative CT scan abdomen and pelvis 2/17/2025.  The pain quality is neuropathic.  There is some chronic dull pain and does seem to be aggravated by his movement.  No nocturnal pain.  On clinical exam there is also tenderness in the inguinal region.  Advised, discussed with his pain clinic provider and discussed possibility of a neuropathic pain that would be amenable to some sort of intervention.  2.  Chronic generalized pain disorder which is better with pregabalin  3.  Liver replaced by transplant    Subjective   Mary is a 54 year old, presenting for the following health issues:  Groin Pain        3/18/2025     3:49 PM   Additional Questions   Roomed by Neha   Accompanied by Wife     History of Present Illness       Reason for visit:  Groin pain  Symptom onset:  More than a month  Symptoms include:  Pain  Symptom intensity:  Severe  Symptom progression:  Worsening  Had these symptoms before:  Yes  Has tried/received treatment for these symptoms:  Yes  Previous treatment was successful:  No  What makes it worse:  Life  What makes it better:  My wife He is missing 2 dose(s) of medications per week.  He is not taking prescribed medications regularly due to remembering to take.      The patient comes in accompanied by his wife.  Complaining of progressively getting worst right groin pain.  The pain actually radiates from the groin into the right scrotum.  Is no pain during the night and sleeps well.  Noticed that with certain movement such as twisting and walking as well as coughing and sneezing he will get the pain.  It is a sharp pain which lasts just a few seconds.  He has some chronic aching discomfort throughout the day.  He had right inguinal hernia repair in September 2024.  Since then he has had CT scan of the abdomen pelvis on 2/17/2025 that was negative.      Review of  "Systems  Constitutional, HEENT, cardiovascular, pulmonary, GI, , musculoskeletal, neuro, skin, endocrine and psych systems are negative, except as otherwise noted.      Objective    /80 (BP Location: Right arm, Patient Position: Sitting, Cuff Size: Adult Large)   Pulse 62   Temp 98.3  F (36.8  C) (Oral)   Resp 12   Ht 1.765 m (5' 9.5\")   Wt 104.8 kg (231 lb)   SpO2 98%   BMI 33.62 kg/m    Body mass index is 33.62 kg/m .  Physical Exam   GENERAL: alert and no distress  ABDOMEN: soft, nontender, without hepatosplenomegaly or masses, bowel sounds normal.  The right inguinal area reveals tenderness on deep palpation including symphysis pubis area.  No discrete palpable mass.  Scrotal exam is negative.  MS: no gross musculoskeletal defects noted, no edema            Signed Electronically by: Jimmie Hoyt MD    "

## 2025-03-23 ENCOUNTER — LAB (OUTPATIENT)
Dept: LAB | Facility: CLINIC | Age: 55
End: 2025-03-23
Payer: COMMERCIAL

## 2025-03-23 DIAGNOSIS — D70.9 NEUTROPENIA: ICD-10-CM

## 2025-03-23 DIAGNOSIS — Z94.4 LIVER REPLACED BY TRANSPLANT (H): ICD-10-CM

## 2025-03-23 LAB
ALBUMIN SERPL BCG-MCNC: 3.8 G/DL (ref 3.5–5.2)
ALP SERPL-CCNC: 265 U/L (ref 40–150)
ALT SERPL W P-5'-P-CCNC: 21 U/L (ref 0–70)
ANION GAP SERPL CALCULATED.3IONS-SCNC: 10 MMOL/L (ref 7–15)
AST SERPL W P-5'-P-CCNC: 23 U/L (ref 0–45)
BASOPHILS # BLD AUTO: 0 10E3/UL (ref 0–0.2)
BASOPHILS NFR BLD AUTO: 0 %
BILIRUB DIRECT SERPL-MCNC: 0.17 MG/DL (ref 0–0.3)
BILIRUB SERPL-MCNC: 0.4 MG/DL
BUN SERPL-MCNC: 45.1 MG/DL (ref 6–20)
CALCIUM SERPL-MCNC: 9.4 MG/DL (ref 8.8–10.4)
CHLORIDE SERPL-SCNC: 102 MMOL/L (ref 98–107)
CREAT SERPL-MCNC: 1.83 MG/DL (ref 0.67–1.17)
EGFRCR SERPLBLD CKD-EPI 2021: 44 ML/MIN/1.73M2
EOSINOPHIL # BLD AUTO: 0.4 10E3/UL (ref 0–0.7)
EOSINOPHIL NFR BLD AUTO: 7 %
ERYTHROCYTE [DISTWIDTH] IN BLOOD BY AUTOMATED COUNT: 12.7 % (ref 10–15)
GLUCOSE SERPL-MCNC: 398 MG/DL (ref 70–99)
HCO3 SERPL-SCNC: 23 MMOL/L (ref 22–29)
HCT VFR BLD AUTO: 36.6 % (ref 40–53)
HGB BLD-MCNC: 12.2 G/DL (ref 13.3–17.7)
IMM GRANULOCYTES # BLD: 0 10E3/UL
IMM GRANULOCYTES NFR BLD: 0 %
LYMPHOCYTES # BLD AUTO: 1.3 10E3/UL (ref 0.8–5.3)
LYMPHOCYTES NFR BLD AUTO: 23 %
MAGNESIUM SERPL-MCNC: 2.2 MG/DL (ref 1.7–2.3)
MCH RBC QN AUTO: 29.9 PG (ref 26.5–33)
MCHC RBC AUTO-ENTMCNC: 33.3 G/DL (ref 31.5–36.5)
MCV RBC AUTO: 90 FL (ref 78–100)
MONOCYTES # BLD AUTO: 0.4 10E3/UL (ref 0–1.3)
MONOCYTES NFR BLD AUTO: 7 %
NEUTROPHILS # BLD AUTO: 3.4 10E3/UL (ref 1.6–8.3)
NEUTROPHILS NFR BLD AUTO: 61 %
PHOSPHATE SERPL-MCNC: 4.7 MG/DL (ref 2.5–4.5)
PLATELET # BLD AUTO: 168 10E3/UL (ref 150–450)
POTASSIUM SERPL-SCNC: 5.2 MMOL/L (ref 3.4–5.3)
PROT SERPL-MCNC: 7 G/DL (ref 6.4–8.3)
RBC # BLD AUTO: 4.08 10E6/UL (ref 4.4–5.9)
SODIUM SERPL-SCNC: 135 MMOL/L (ref 135–145)
WBC # BLD AUTO: 5.5 10E3/UL (ref 4–11)
WBC # BLD AUTO: 5.5 10E3/UL (ref 4–11)

## 2025-03-23 PROCEDURE — 85025 COMPLETE CBC W/AUTO DIFF WBC: CPT

## 2025-03-23 PROCEDURE — 82248 BILIRUBIN DIRECT: CPT

## 2025-03-23 PROCEDURE — 83735 ASSAY OF MAGNESIUM: CPT

## 2025-03-23 PROCEDURE — 84100 ASSAY OF PHOSPHORUS: CPT

## 2025-03-23 PROCEDURE — 80158 DRUG ASSAY CYCLOSPORINE: CPT

## 2025-03-23 PROCEDURE — 80053 COMPREHEN METABOLIC PANEL: CPT

## 2025-03-23 PROCEDURE — 36415 COLL VENOUS BLD VENIPUNCTURE: CPT

## 2025-03-24 DIAGNOSIS — Z94.4 S/P LIVER TRANSPLANT (H): ICD-10-CM

## 2025-03-24 DIAGNOSIS — Z94.4 LIVER REPLACED BY TRANSPLANT (H): Primary | Chronic | ICD-10-CM

## 2025-03-24 LAB
CYCLOSPORINE BLD LC/MS/MS-MCNC: 253 UG/L (ref 50–400)
TME LAST DOSE: NORMAL H
TME LAST DOSE: NORMAL H

## 2025-03-24 NOTE — TELEPHONE ENCOUNTER
ISSUE:   Cyclosporine level 253 on 3/23, goal , dose 175 mg BID.  New goal is ,  Mary's levels seem to be erratic.    PLAN:   Call Patient and confirm this was an accurate 12-hour trough.   Verify Cyclosporine dose.  Confirm no new medications or or missed doses.   Confirm no new illness / infection / diarrhea.   If accurate trough and accurate dose, decrease Cyclosporine dose to 125 mg BID.  Please also mention very high glucose, advise help from endocrine or PCP .  Alk phos rising, last ERCP 2/23, stent free trial.    Repeat CSA level in a week. All other labs the morning of Dr. Nova visit on 4/1 - he'll need an appt that morning for labs.   Lore Mckeon, RN      OUTCOME:   Spoke with Rosio, they confirm accurate trough level and current dose 200 mg BID.   Rosio confirmed dose change to 150 mg BID.  Rosio agreed to repeat csa this week .   Orders sent to preferred pharmacy for dose change and lab for repeat labs.   Rosio voiced understanding of plan.    Valentina Enamorado LPN

## 2025-03-25 RX ORDER — CYCLOSPORINE 100 MG/1
100 CAPSULE, LIQUID FILLED ORAL EVERY 12 HOURS
Qty: 120 CAPSULE | Refills: 11 | Status: SHIPPED | OUTPATIENT
Start: 2025-03-25

## 2025-03-25 RX ORDER — CYCLOSPORINE 25 MG/1
50 CAPSULE, LIQUID FILLED ORAL EVERY 12 HOURS
Qty: 120 CAPSULE | Refills: 11 | Status: SHIPPED | OUTPATIENT
Start: 2025-03-25

## 2025-03-27 ENCOUNTER — LAB (OUTPATIENT)
Dept: LAB | Facility: CLINIC | Age: 55
End: 2025-03-27
Payer: COMMERCIAL

## 2025-03-27 DIAGNOSIS — Z94.4 LIVER REPLACED BY TRANSPLANT (H): Chronic | ICD-10-CM

## 2025-03-27 LAB
CYCLOSPORINE BLD LC/MS/MS-MCNC: 165 UG/L (ref 50–400)
TME LAST DOSE: NORMAL H
TME LAST DOSE: NORMAL H

## 2025-03-28 ENCOUNTER — TELEPHONE (OUTPATIENT)
Dept: FAMILY MEDICINE | Facility: CLINIC | Age: 55
End: 2025-03-28

## 2025-03-30 ENCOUNTER — MYC MEDICAL ADVICE (OUTPATIENT)
Dept: FAMILY MEDICINE | Facility: CLINIC | Age: 55
End: 2025-03-30
Payer: COMMERCIAL

## 2025-03-30 DIAGNOSIS — R39.12 POOR URINARY STREAM: ICD-10-CM

## 2025-03-31 RX ORDER — TAMSULOSIN HYDROCHLORIDE 0.4 MG/1
0.4 CAPSULE ORAL DAILY
Qty: 90 CAPSULE | Refills: 3 | Status: SHIPPED | OUTPATIENT
Start: 2025-03-31 | End: 2025-04-02

## 2025-03-31 NOTE — TELEPHONE ENCOUNTER
PCP increased Tamsulosin to 2 capsule a day on 3/6/25 due to urine hesitancy worsening.     Patient requesting updated prescription to be sent to pharmacy. Pended original prescription, please update prescription if appropriate.     BRIDGET Smith  [facility]

## 2025-04-01 ENCOUNTER — LAB (OUTPATIENT)
Dept: LAB | Facility: CLINIC | Age: 55
End: 2025-04-01
Payer: COMMERCIAL

## 2025-04-01 ENCOUNTER — OFFICE VISIT (OUTPATIENT)
Dept: GASTROENTEROLOGY | Facility: CLINIC | Age: 55
End: 2025-04-01
Attending: INTERNAL MEDICINE
Payer: COMMERCIAL

## 2025-04-01 ENCOUNTER — TELEPHONE (OUTPATIENT)
Dept: TRANSPLANT | Facility: CLINIC | Age: 55
End: 2025-04-01

## 2025-04-01 VITALS
SYSTOLIC BLOOD PRESSURE: 106 MMHG | WEIGHT: 231 LBS | OXYGEN SATURATION: 95 % | BODY MASS INDEX: 33.07 KG/M2 | HEIGHT: 70 IN | DIASTOLIC BLOOD PRESSURE: 71 MMHG | HEART RATE: 71 BPM

## 2025-04-01 DIAGNOSIS — D70.9 NEUTROPENIA: ICD-10-CM

## 2025-04-01 DIAGNOSIS — Z94.4 LIVER REPLACED BY TRANSPLANT (H): Primary | Chronic | ICD-10-CM

## 2025-04-01 DIAGNOSIS — K70.11 ALCOHOLIC HEPATITIS WITH ASCITES (H): Chronic | ICD-10-CM

## 2025-04-01 DIAGNOSIS — K86.9 DIABETES MELLITUS ASSOCIATED WITH PANCREATIC DISEASE (H): ICD-10-CM

## 2025-04-01 DIAGNOSIS — Z98.890 HISTORY OF BILIARY DUCT STENT PLACEMENT: ICD-10-CM

## 2025-04-01 DIAGNOSIS — N18.32 CHRONIC KIDNEY DISEASE, STAGE 3B (H): ICD-10-CM

## 2025-04-01 DIAGNOSIS — Z94.4 S/P LIVER TRANSPLANT (H): ICD-10-CM

## 2025-04-01 DIAGNOSIS — Z94.4 LIVER REPLACED BY TRANSPLANT (H): Chronic | ICD-10-CM

## 2025-04-01 DIAGNOSIS — D84.9 IMMUNOSUPPRESSED STATUS: Chronic | ICD-10-CM

## 2025-04-01 DIAGNOSIS — G89.29 CHRONIC GENERALIZED PAIN DISORDER: Primary | ICD-10-CM

## 2025-04-01 DIAGNOSIS — E11.69 DIABETES MELLITUS ASSOCIATED WITH PANCREATIC DISEASE (H): ICD-10-CM

## 2025-04-01 DIAGNOSIS — Z94.4 LIVER REPLACED BY TRANSPLANT (H): ICD-10-CM

## 2025-04-01 DIAGNOSIS — R52 CHRONIC GENERALIZED PAIN DISORDER: Primary | ICD-10-CM

## 2025-04-01 LAB
ALBUMIN SERPL BCG-MCNC: 4 G/DL (ref 3.5–5.2)
ALP SERPL-CCNC: 213 U/L (ref 40–150)
ALT SERPL W P-5'-P-CCNC: 22 U/L (ref 0–70)
ANION GAP SERPL CALCULATED.3IONS-SCNC: 7 MMOL/L (ref 7–15)
AST SERPL W P-5'-P-CCNC: 29 U/L (ref 0–45)
BASOPHILS # BLD AUTO: 0 10E3/UL (ref 0–0.2)
BASOPHILS NFR BLD AUTO: 1 %
BILIRUB DIRECT SERPL-MCNC: 0.19 MG/DL (ref 0–0.3)
BILIRUB SERPL-MCNC: 0.4 MG/DL
BUN SERPL-MCNC: 33.3 MG/DL (ref 6–20)
CALCIUM SERPL-MCNC: 9.5 MG/DL (ref 8.8–10.4)
CHLORIDE SERPL-SCNC: 106 MMOL/L (ref 98–107)
CREAT SERPL-MCNC: 1.6 MG/DL (ref 0.67–1.17)
CYCLOSPORINE BLD LC/MS/MS-MCNC: 136 UG/L (ref 50–400)
EGFRCR SERPLBLD CKD-EPI 2021: 51 ML/MIN/1.73M2
EOSINOPHIL # BLD AUTO: 0.6 10E3/UL (ref 0–0.7)
EOSINOPHIL NFR BLD AUTO: 8 %
ERYTHROCYTE [DISTWIDTH] IN BLOOD BY AUTOMATED COUNT: 13.2 % (ref 10–15)
GLUCOSE SERPL-MCNC: 111 MG/DL (ref 70–99)
HCO3 SERPL-SCNC: 25 MMOL/L (ref 22–29)
HCT VFR BLD AUTO: 39 % (ref 40–53)
HGB BLD-MCNC: 13 G/DL (ref 13.3–17.7)
IMM GRANULOCYTES # BLD: 0 10E3/UL
IMM GRANULOCYTES NFR BLD: 0 %
LYMPHOCYTES # BLD AUTO: 1.2 10E3/UL (ref 0.8–5.3)
LYMPHOCYTES NFR BLD AUTO: 17 %
MAGNESIUM SERPL-MCNC: 1.8 MG/DL (ref 1.7–2.3)
MCH RBC QN AUTO: 30.3 PG (ref 26.5–33)
MCHC RBC AUTO-ENTMCNC: 33.3 G/DL (ref 31.5–36.5)
MCV RBC AUTO: 91 FL (ref 78–100)
MONOCYTES # BLD AUTO: 0.5 10E3/UL (ref 0–1.3)
MONOCYTES NFR BLD AUTO: 7 %
NEUTROPHILS # BLD AUTO: 4.9 10E3/UL (ref 1.6–8.3)
NEUTROPHILS NFR BLD AUTO: 68 %
NRBC # BLD AUTO: 0 10E3/UL
NRBC BLD AUTO-RTO: 0 /100
PHOSPHATE SERPL-MCNC: 3.6 MG/DL (ref 2.5–4.5)
PLATELET # BLD AUTO: 183 10E3/UL (ref 150–450)
POTASSIUM SERPL-SCNC: 5.4 MMOL/L (ref 3.4–5.3)
PROT SERPL-MCNC: 7.3 G/DL (ref 6.4–8.3)
RBC # BLD AUTO: 4.29 10E6/UL (ref 4.4–5.9)
SODIUM SERPL-SCNC: 138 MMOL/L (ref 135–145)
TME LAST DOSE: NORMAL H
TME LAST DOSE: NORMAL H
WBC # BLD AUTO: 7.3 10E3/UL (ref 4–11)
WBC # BLD AUTO: 7.3 10E3/UL (ref 4–11)

## 2025-04-01 PROCEDURE — 80158 DRUG ASSAY CYCLOSPORINE: CPT | Performed by: INTERNAL MEDICINE

## 2025-04-01 PROCEDURE — 3078F DIAST BP <80 MM HG: CPT | Performed by: INTERNAL MEDICINE

## 2025-04-01 PROCEDURE — 99213 OFFICE O/P EST LOW 20 MIN: CPT | Performed by: INTERNAL MEDICINE

## 2025-04-01 PROCEDURE — 1125F AMNT PAIN NOTED PAIN PRSNT: CPT | Performed by: INTERNAL MEDICINE

## 2025-04-01 PROCEDURE — 3074F SYST BP LT 130 MM HG: CPT | Performed by: INTERNAL MEDICINE

## 2025-04-01 PROCEDURE — 99215 OFFICE O/P EST HI 40 MIN: CPT | Performed by: INTERNAL MEDICINE

## 2025-04-01 RX ORDER — CYCLOSPORINE 100 MG/1
100 CAPSULE, LIQUID FILLED ORAL EVERY 12 HOURS
Qty: 60 CAPSULE | Refills: 11 | Status: SHIPPED | OUTPATIENT
Start: 2025-04-01 | End: 2025-04-07

## 2025-04-01 RX ORDER — MYCOPHENOLIC ACID 360 MG/1
360 TABLET, DELAYED RELEASE ORAL 2 TIMES DAILY
Qty: 60 TABLET | Refills: 5 | Status: SHIPPED | OUTPATIENT
Start: 2025-04-01

## 2025-04-01 ASSESSMENT — PAIN SCALES - GENERAL: PAINLEVEL_OUTOF10: MILD PAIN (2)

## 2025-04-01 ASSESSMENT — ENCOUNTER SYMPTOMS: NEW SYMPTOMS OF CORONARY ARTERY DISEASE: 0

## 2025-04-01 NOTE — PATIENT INSTRUCTIONS
"It was a pleasure taking care of you today.  I've included a brief summary of our discussion and care plan from today's visit below.  Please review this information with your primary care provider.  _______________________________________________________________________    My recommendations are summarized as follows:    Add Myfortic 360 in order to decrease the cyclosporine to 100 (\"Renal sparing\" protocol); check labs more frequently  Let us know if you want to see Nephrology  Continue to follow with Pain management  Visit with Dr. Cortez        Return to Liver/Hepatology Clinic in 6 months.      _______________________________________________________________________    Scheduling Procedures or Tests  - To schedule endoscopic procedures call: [phone]  - To schedule radiology tests call: [phone] (for [facility]) or [phone] (for [facility])   _______________________________________________________________________    Who do I call with any questions after my visit?  Please be in touch if there are any further questions that arise following today's visit.  There are multiple ways to contact your gastroenterology care team.      For non urgent items, we encourage you to contact your coordinator/care team online via [employer]. [employer] messages are answered by your nurse or doctor typically within 48 hours. Please allow extra time on weekends and holidays.      You and your care team can also contact your transplant coordinator Monday - Friday, 8am - 5pm at [phone] (Option 2 to reach the coordinator or Option 5 to schedule an appointment).    For urgent/emergent questions after business hours, you may reach the on-call GI Fellow by contacting the [facility]  at [phone].     How will I get the results of any tests ordered?    - If you have signed up for MyChart, any tests ordered at your visit will be available to you after your physician reviews them.  Typically " this takes 1-2 weeks.    - If you do not have MyChart, your results will either be mailed to you as a letter or via a telephone call.  - If there are urgent results that require a change in your care plan, your physician or nurse will call you to discuss the next steps.      What is [employer]?  [employer] is a secure way for you to access all of your healthcare records from the [facility].  It is a web based computer program, so you can sign on to it from any location.  It also allows you to send secure messages to your care team.  I recommend signing up for [employer] access if you have not already done so and are comfortable with using a computer.      How to I schedule a follow-up visit?  If you did not schedule a follow-up visit today, please call [phone] to schedule a follow-up office visit.     Sincerely,    Ora Nova MD  Advanced & Transplant Hepatology  [facility]

## 2025-04-01 NOTE — NURSING NOTE
"Chief Complaint   Patient presents with    RECHECK     CKD      /71 (BP Location: Right arm, Patient Position: Sitting)   Pulse 71   Ht 1.765 m (5' 9.5\")   Wt 104.8 kg (231 lb)   SpO2 95%   BMI 33.62 kg/m    Maurizio Barrios CMA on 4/1/2025 at 8:06 AM    "

## 2025-04-01 NOTE — LETTER
"4/1/2025      Mary Anayarowan Lopez  [address]  [place] [address]      Dear Colleague,    Thank you for referring your patient, Mary Lopez, to the [facility] [place]. Please see a copy of my visit note below.    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  OPERATION CiT 5:26, EBL 3L, end-to-end IVC, duct-to-duct biliary anastomosis  REJECTION: mod-severe ACR 3/15/24.  BILIARY ISSUES: anastomotic stricture, last ERCP 2/2025 with resolution, stent-free trial.   Hospital course c/b delirium of unclear etiology, possibly FK, transitioned to CSA.    KIDNEY FUNCTION: repeated ANGEL prior to OLT 2/2024, then immediately post operative, improving to baseline normal. Really consistent insults/steadily worsening renal function since June 2024. Baseline Cr now 1.6-1.8  Recently started on tamsulosin for prostate/hesitancy, so perhaps some component of post obstructive disease. CT 2/2025 negative for hydronephrosis.   Additionally with uncontrolled blood glucose, IDDM2. UA without proteinuria, microalbuminuria.     DISEASE RECURRENCE: N    EXPLANT: as expected \"Laennec 4c\" cirrhosis; no HCC; 4+ hemosiderosis    *Graft function continues to be excellent.      IMMUNOSUPPRESSION: CSA monotherapy now 125 BID (Decreased in March; and weaned Myfortic).  CSA troughs truly all over the place - low as 70, highs in 200s.  Admits to using marijuana.     OTHER ISSUES: inguinal hernia repair 9/2024 (Dr. Cortez) and with recurrent neuropathic pain, now on Lyrica, followed by pain clinic.  Requesting to follow up with Dr. Cortez.    Health Maintenance:   1. Skin cancer screening: Recommend a dermatology appoint once yearly for skin cancer screening, given the increased " "lifetime risk of skin cancer in organ transplant patients. We have recommended patient avoid prolonged sun exposure and use SPF 35 or greater.  2. Recommend the patient follow-up with primary care physician on a regular basis for management of other medical issues and preventive care.  3. Osteoporosis screening DEXA 1/2024 normal BMD. Recommend DEXA scan every 1-2 years due to increased risk of osteoporosis post-transplantation.  4. Colon cancer screening: We recommend a colonoscopy as per age appropriate guidelines. Colon due 2031.  5. Vaccinations: We recommend yearly influenza +/- COVID boosters, as well as other age appropriate vaccines. May NOT receive any live virus vaccines post OLT/IS, including MMR, varicella, Zostavax (previously used old Zoster vaccine), rotavirus, influenza nasal spray, and yellow fever.    Plan:  - Discussed renal function, specifically in regard to CNI, strategieis with IS, including \"renal-sparing\".  - We discussed obtaining accurate troughs, including medications or drugs that unpredictably interfere with levels, including MJ.  - Given hx T cell mediated rejection early, renal function, and now >12 mos post, we mutually decided to reduce CSA as much as possible to goal (accurate goal ideally) of 50-80 and add back low dose Myfortic 360 to facilitate this. Go down to  BID and likely reduce further.  Wife happy about checking levels more frequently with this change.  - Avoidance of nephrotoxins and continued enhanced BG control. We discussed referral back to Renal if we do not note any meaningful change in 3-6 mos with above.   - Referral back to [facility] for hernia. Continue to follow with pain management.       Follow up:  6 mos    I spent 50 minutes on this encounter performing the following: reviewing the patient's medical record (clinic visits, hospital records, lab results, imaging and procedural documentation), history taking, physical exam and documentation on the " "date of the encounter. I also spent part of the time in coordination of care and counseling.        Ora Nova MD  Tx Hepatology  [facility]  ----------------------------------------------------------------------------------------------------------------------------------------------------------------------------------------------------------------------------------------------------------------  Subjective:            54 year old male presenting for routine post OLT follow up.   Last seen by Dr. Muniz in October 2024.    Since he was last seen, Mr. Lopez is feeling okay.  No hospitalizations.   Still mainly dealing with \"nerve\" pain and pain.  Since last visit he was started on Lyrica for pain, as well as tamsulosin for some hesitancy.   Wife concerned about renal function and lab checks.    He denies any s/s referrable to graft dysfunction or medication SEs. Specifically, denies ab pain, N/V, melena, BRBPR, jaundice, pruritus, fluid retention, episodes of confusion, or day-night reversal, fevers, chills, unintentional weight loss, CP, SOB/MOE, HA, tremors.        History of Present Illness   Mary Lopez is a 54 year old male s/p OLT 3/5/24 for EtOH/A1AT MZ/C282Y het cirrhosis.   Transplant course has been c/b by early T cell mediated rejection; anastomotic stricture, now on stent-free trial; pain; and renal insufficiency.    He is now on CSA monotherapy, transitioned from FK early on d/t possible neuro side effects. He tapered off low dose Myfortic over the Winter 2025.   He typically takes his PM dose of CSA at 7P.    Graft function remains excellent.       Medications:  Current Outpatient Medications   Medication Sig Dispense Refill     ACE/ARB/ARNI NOT PRESCRIBED (INTENTIONAL) Please choose reason not prescribed from choices below.       blood glucose (NO BRAND SPECIFIED) test strip Use to test blood sugar two times daily or as directed. To accompany: Blood Glucose " Monitor Brands: per insurance. 100 strip 6     blood glucose monitoring (NO BRAND SPECIFIED) meter device kit Use to test blood sugar twice times daily or as directed. Preferred blood glucose meter OR supplies to accompany: Blood Glucose Monitor Brands: per insurance. 1 kit 0     Continuous Glucose Sensor (DEXCOM G7 SENSOR) MISC Change every 10 days. 9 each 3     cycloSPORINE modified (GENERIC EQUIVALENT) 100 MG capsule Take 1 capsule (100 mg) by mouth every 12 hours. Total dose 125 mg BID 60 capsule 11     diclofenac (VOLTAREN) 1 % topical gel Apply 4 grams to knees four times daily using enclosed dosing card. 100 g 1     empagliflozin (JARDIANCE) 25 MG TABS tablet Take 1 tablet (25 mg) by mouth daily. 90 tablet 1     Glucagon (GVOKE HYPOPEN) 1 MG/0.2ML pen Inject the contents of 1 device under the skin into lower abdomen, outer thigh, or outer upper arm as needed for hypoglycemia. If no response after 15 minutes, additional 1 mg dose from a new device may be injected while waiting for emergency assistance. 0.4 mL 1     Injection Device for insulin (CEQUR SIMPLICITY 2U) [id number] 1 each every 3 days 10 each 5     insulin degludec (TRESIBA FLEXTOUCH) 100 UNIT/ML pen Inject 16 units subcutaneous each am. Please do NOT fill today ( 9/4/2024 ). 15 mL 5     Insulin Lispro-aabc, 1 U Dial, (AMNAUMMAGDALENA AREVALOPEN) 100 UNIT/ML SOPN Inject 6 Units Subcutaneous 3 times daily (with meals) 6 units with meals, plus correction. Pt may use up to 30 units daily. This REPLACES Humalog/Novolog insulin. 30 mL 2     insulin pen needle (29G X 12.7MM) 29G X 12.7MM miscellaneous For insulin administration 4x daily. 400 each 3     insulin pen needle (32G X 4 MM) 32G X 4 MM miscellaneous Use as directed by provider Per insurance coverage 200 each 1     insulin pen needle (BD PEN NEEDLE SHERRIE 2ND GEN) 32G X 4 MM miscellaneous USES 5 PER  each 3     methocarbamol (ROBAXIN) 750 MG tablet Take 1 tablet (750 mg) by mouth 3 times daily as needed for  "muscle spasms. 15 tablet 0     multivitamin w/minerals (THERA-VIT-M) tablet Take 1 tablet by mouth daily. 90 tablet 3     mycophenolic acid (GENERIC EQUIVALENT) 360 MG EC tablet Take 1 tablet (360 mg) by mouth 2 times daily. 60 tablet 5     omeprazole (PRILOSEC) 20 MG DR capsule Take 20 mg by mouth daily 180 capsule 1     pregabalin (LYRICA) 75 MG capsule Take 1 capsule (75 mg) by mouth 2 times daily. 60 capsule 1     QUEtiapine (SEROQUEL) 25 MG tablet Take 25 mg by mouth at bedtime.       sodium zirconium cyclosilicate (LOKELMA) 10 g PACK packet Take 1 packet (10 g) by mouth daily as needed (Hyperkalemia). 30 each 0     thin (NO BRAND SPECIFIED) lancets Use with lanceting device. To accompany: Blood Glucose Monitor Brands: per insurance. 100 each 6     Magnesium Bisglycinate (MAG GLYCINATE) 100 MG TABS Take 1 tablet (100 mg) by mouth 2 times daily 180 tablet 1     tamsulosin (FLOMAX) 0.4 MG capsule Take 1 capsule (0.4 mg) by mouth daily. 90 capsule 3     Thiamine Mononitrate (B1) 100 MG TABS Take 1 tablet by mouth daily at 2 pm.       Current Facility-Administered Medications   Medication Dose Route Frequency Provider Last Rate Last Admin     sodium chloride (PF) 0.9% PF flush 3 mL  3 mL Intravenous q1 min prn            Review of Systems  A complete 10 point review of systems was asked and answered in the negative unless specifically commented upon in the HPI    Objective:         Vitals:    04/01/25 0802   BP: 106/71   BP Location: Right arm   Patient Position: Sitting   Pulse: 71   SpO2: 95%   Weight: 104.8 kg (231 lb)   Height: 1.765 m (5' 9.5\")     Body mass index is 33.62 kg/m .     Physical Exam  Constitutional: Well-developed, well-nourished, in no apparent distress.    HEENT: Normocephalic. No scleral icterus.   Neck/Lymph: Normal ROM  Cardiac:  Regular rate  Respiratory: Normal Respiratory excursion  GI:  Abdomen soft, non distended, non-tender. Well healed OLT scar, no incisional hernias  Skin:  Skin is " warm and dry. No rashes  Peripheral Vascular: no lower extremity edema.  Musculoskeletal:  ROM intact, adequate muscle bulk    Psychiatric: Normal mood and affect. Behavior is normal.  Neuro:  no tremor, CN intact     Labs and Diagnostic tests:  Reviewed 3/27        Imaging:  CT 2/2025  Narrative & Impression   EXAM: CT ABDOMEN PELVIS W CONTRAST  LOCATION: [facility]  DATE: 2/17/2025     INDICATION:  Groin pain, right  COMPARISON: 3/30/2024  TECHNIQUE: CT scan of the abdomen and pelvis was performed following injection of IV contrast. Multiplanar reformats were obtained. Dose reduction techniques were used.  CONTRAST: 122ml isovue 370     FINDINGS:   LOWER CHEST: Normal.     HEPATOBILIARY: Status post liver transplantation. There is a very small fluid collection along the posterior inferior right hepatic lobe measuring approximately 3.1 x 1.6 cm may represent a postoperative seroma. There was a large fluid collection on the   prior study dated 3/30/2024 within this area.     PANCREAS: Normal.     SPLEEN: Spleen is enlarged measuring 15.3 cm.     ADRENAL GLANDS: Normal.     KIDNEYS/BLADDER: No significant mass, stone, or hydronephrosis.     BOWEL: Diverticulosis of the colon. No acute inflammatory change. No obstruction.      LYMPH NODES: Normal.     VASCULATURE: Large varices are noted within the left upper abdomen adjacent to the stomach and spleen.     PELVIC ORGANS: Tiny fat-containing umbilical hernia. Bladder within normal limits.     MUSCULOSKELETAL: Degenerative changes of the spine.                                                                      IMPRESSION:   1.  No acute findings within the abdomen and pelvis.  No inguinal hernia or other explanation for pain of right groin pain.   2.  Splenomegaly.       Procedures:    Colonoscopy:   1/2024 3mm transverse colon polyp (insufficient, possible early hyperplastic change, etc)       Again, thank you for allowing me to  participate in the care of your patient.        Sincerely,        ALEJANDRO MTZ MD    Electronically signed

## 2025-04-01 NOTE — TELEPHONE ENCOUNTER
Met with Mary and Adina during their Dr. Nova appt this morning.  Mary is doing well other than pain in right groin that shoots up to his abdomen and down toward his knee.  He is attributing this possible to the hernia repair he had last September.  He has been seeing a pain specialist but the pain is prohibiting him from his normal behavior / activity.  He is now on pregabalin which helps at times.  He is requesting an appt w/ Dr. Galdamez to discuss this.  He will see him on 4/7.  His creatinine is elevated.  He admits to taking THC intermittently, Dr. Nova feels this may be the reason for his variable CSA levels.  She would like  him to start myfortic 360 mg bid, she lowered his CSA dose to 100 mg po bid, wants new CSA goal to be 50-80 for a kidney sparing regimen.  Adina is not comfortable w/ every 2 month labs.  We will increase lab frequency now due to IS change.  REcently saw a dermatologist, knows he needs to see annually.   Blood sugars also  highly variable.  Is working w/ endocrine.  Also working w/ urology for urinary hesitancy    Mary will need labs (hepatic panel , metabolic panel and CSA level) every 2 weeks x 2 mos..    Dr. Nova requesting return visit in 6 mos.

## 2025-04-02 ENCOUNTER — MYC MEDICAL ADVICE (OUTPATIENT)
Dept: FAMILY MEDICINE | Facility: CLINIC | Age: 55
End: 2025-04-02
Payer: COMMERCIAL

## 2025-04-02 DIAGNOSIS — R39.12 POOR URINARY STREAM: ICD-10-CM

## 2025-04-02 RX ORDER — TAMSULOSIN HYDROCHLORIDE 0.4 MG/1
0.4 CAPSULE ORAL DAILY
Qty: 90 CAPSULE | Refills: 3 | Status: SHIPPED | OUTPATIENT
Start: 2025-04-02

## 2025-04-02 RX ORDER — TAMSULOSIN HYDROCHLORIDE 0.4 MG/1
CAPSULE ORAL DAILY
Qty: 90 CAPSULE | Refills: 3 | OUTPATIENT
Start: 2025-04-02

## 2025-04-02 NOTE — TELEPHONE ENCOUNTER
Pt requesting updated prescription be sent to pharmacy (see message below).         Rx pended and routed for updating by PCP.    Evelyn Louie RN, BSN  [facility] Triage

## 2025-04-03 ENCOUNTER — VIRTUAL VISIT (OUTPATIENT)
Dept: EDUCATION SERVICES | Facility: CLINIC | Age: 55
End: 2025-04-03
Payer: COMMERCIAL

## 2025-04-03 DIAGNOSIS — E11.9 TYPE 2 DIABETES MELLITUS WITHOUT COMPLICATION, WITH LONG-TERM CURRENT USE OF INSULIN (H): Primary | Chronic | ICD-10-CM

## 2025-04-03 DIAGNOSIS — Z79.4 TYPE 2 DIABETES MELLITUS WITHOUT COMPLICATION, WITH LONG-TERM CURRENT USE OF INSULIN (H): Primary | Chronic | ICD-10-CM

## 2025-04-03 LAB
ALP BONE SERPL-CCNC: 96 U/L
ALP LIVER SERPL-CCNC: 138 U/L
ALP OTHER SERPL-CCNC: 0 U/L
ALP SERPL-CCNC: 234 U/L

## 2025-04-03 ASSESSMENT — PAIN SCALES - GENERAL: PAINLEVEL_OUTOF10: NO PAIN (0)

## 2025-04-03 NOTE — PROGRESS NOTES
Joined the call at 4/3/2025, 11:37:27 am.  Left the call at 4/3/2025, 12:06:05 pm.  You were on the call for 28 minutes 37 seconds    Virtual Visit Details    Type of service:  Video Visit     Originating Location (pt. Location): Home    Distant Location (provider location):  Off-site  Platform used for Video Visit: Blaast    Diabetes Self-Management Education & Support    Mary Lopez presents today for education related to Type 2 diabetes    Patient is being treated with:  insulin  He is accompanied by spouse    Year of diagnosis: 2023  Referring provider:  Lai  Patient is followed by Suzanne THOMPSON, now followed by Geovanna THOMPSON  Living Situation: with spouse   Employment: n/a    PATIENT CONCERNS RELATED TO DIABETES SELF MANAGEMENT:   Concerned about lows - thinks doses are too high. Has been skipping some doses because of this.     ASSESSMENT:    Taking Medication:     Current Diabetes Management per Patient:  Taking diabetes medications? yes:     Diabetes Medication(s)       Diabetic Other       Glucagon (GVOKE HYPOPEN) 1 MG/0.2ML pen Inject the contents of 1 device under the skin into lower abdomen, outer thigh, or outer upper arm as needed for hypoglycemia. If no response after 15 minutes, additional 1 mg dose from a new device may be injected while waiting for emergency assistance.       Insulin       insulin degludec (TRESIBA FLEXTOUCH) 100 UNIT/ML pen Inject 16 units subcutaneous each am. Please do NOT fill today ( 9/4/2024 ).     Insulin Lispro-aabc, 1 U Dial, (LYUMJEV KWIKPEN) 100 UNIT/ML SOPN Inject 6 Units Subcutaneous 3 times daily (with meals) 6 units with meals, plus correction. Pt may use up to 30 units daily. This REPLACES Humalog/Novolog insulin.       Sodium-Glucose Co-Transporter 2 (SGLT2) Inhibitors       empagliflozin (JARDIANCE) 25 MG TABS tablet Take 1 tablet (25 mg) by mouth daily.            Monitoring                              Patient's most recent   Lab Results    Component Value Date    A1C 8.1 10/31/2023        Healthy Eating:   encouraged consistent carb diet  Encouraged to reduce added sugar  Have protein and vegetables with carbohydrate at meals  Avoid sweetened beverages.    Problem Solving:      Patient is at risk of hypoglycemia?: YES  Hospitalizations for hyper or hypoglycemia: No      EDUCATION and INSTRUCTION PROVIDED AT THIS VISIT:      Follow up with Mary today. He has had some pain which has affected his appetite and he is eating less. He is concerned about the lows he has been having. He is sometimes skipping insulin because of this. And he also admits to over-treating lows as well. He would like to reduce doses today. Current doses are 24 Tresiba, 8-9 units with a meal, 5 units with a small meal or large snack.  Tresiba decrease to 22 units  Lyumjev decrease by 1 unit. Take 7 or 8 with regular meals and 4 with a smaller meal or larger snack.   Same correction 1/50/140/200    He had talked about mounjaro with Geovanna Ferreira, but doesn't want to add anything new right now. We also talked about the benefits of a pump - specifically omnipod as I think this would be easiest and most comfortable for him, but he still is not interested. He will think about it.     PLAN:  Tresiba to 22 units   Lyumjev 7-8 units with meals plus correction 1/50/140/200.   4 units with a snack (half the size of a meal)    Patient-stated goal written and given to Mary Lopez.  Verbalized and demonstrated understanding of instructions.         Any diabetes medication dose changes were made via the CDE Protocol and Collaborative Practice Agreement with [place] and  [facility].  A copy of this encounter was provided to patient's referring provider.

## 2025-04-03 NOTE — NURSING NOTE
Current patient location: [address] [place] [address]  [place] [address]    Is the patient currently in the state of [place]? YES    Visit mode: VIDEO    If the visit is dropped, the patient can be reconnected by:VIDEO VISIT: Text to cell phone:   Telephone Information:   Mobile [phone]   Mobile [phone]       Will anyone else be joining the visit? NO  (If patient encounters technical issues they should call [phone]    Are changes needed to the allergy or medication list? No    Are refills needed on medications prescribed by this physician? NO    Rooming Documentation:  Questionnaire(s) completed    Reason for visit: Diabetes Education    Cecilia CHRISTY

## 2025-04-06 PROBLEM — F10.21 ALCOHOL DEPENDENCE, IN REMISSION (H): Status: ACTIVE | Noted: 2025-04-06

## 2025-04-06 NOTE — PROGRESS NOTES
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  OPERATION CiT 5:26, EBL 3L, end-to-end IVC, duct-to-duct biliary anastomosis  REJECTION: mod-severe ACR 3/15/24.  BILIARY ISSUES: anastomotic stricture, last ERCP 2/2025 with resolution, stent-free trial.   Hospital course c/b delirium of unclear etiology, possibly FK, transitioned to CSA.    KIDNEY FUNCTION: repeated ANGEL prior to OLT 2/2024, then immediately post operative, improving to baseline normal. Really consistent insults/steadily worsening renal function since June 2024. Baseline Cr now 1.6-1.8  Recently started on tamsulosin for prostate/hesitancy, so perhaps some component of post obstructive disease. CT 2/2025 negative for hydronephrosis.   Additionally with uncontrolled blood glucose, IDDM2. UA without proteinuria, microalbuminuria.     DISEASE RECURRENCE: N    EXPLANT: as expected \"[place]ennec 4c\" cirrhosis; no HCC; 4+ hemosiderosis    *Graft function continues to be excellent.      IMMUNOSUPPRESSION: CSA monotherapy now 125 BID (Decreased in March; and weaned Myfortic).  CSA troughs truly all over the place - low as 70, highs in 200s.  Admits to using marijuana.     OTHER ISSUES: inguinal hernia repair 9/2024 (Dr. Cortez) and with recurrent neuropathic pain, now on Lyrica, followed by pain clinic.  Requesting to follow up with Dr. Cortez.    Health Maintenance:   1. Skin cancer screening: Recommend a dermatology appoint once yearly for skin cancer screening, given the increased lifetime risk of skin cancer in organ transplant patients. We have recommended patient avoid prolonged sun exposure and use SPF 35 or greater.  2. Recommend the patient follow-up with primary care physician on a regular basis for management of other medical issues " "and preventive care.  3. Osteoporosis screening DEXA 1/2024 normal BMD. Recommend DEXA scan every 1-2 years due to increased risk of osteoporosis post-transplantation.  4. Colon cancer screening: We recommend a colonoscopy as per age appropriate guidelines. Colon due 2031.  5. Vaccinations: We recommend yearly influenza +/- COVID boosters, as well as other age appropriate vaccines. May NOT receive any live virus vaccines post OLT/IS, including MMR, varicella, Zostavax (previously used old Zoster vaccine), rotavirus, influenza nasal spray, and yellow fever.    Plan:  - Discussed renal function, specifically in regard to CNI, strategieis with IS, including \"renal-sparing\".  - We discussed obtaining accurate troughs, including medications or drugs that unpredictably interfere with levels, including MJ.  - Given hx T cell mediated rejection early, renal function, and now >12 mos post, we mutually decided to reduce CSA as much as possible to goal (accurate goal ideally) of 50-80 and add back low dose Myfortic 360 to facilitate this. Go down to  BID and likely reduce further.  Wife happy about checking levels more frequently with this change.  - Avoidance of nephrotoxins and continued enhanced BG control. We discussed referral back to Renal if we do not note any meaningful change in 3-6 mos with above.   - Referral back to [facility] for hernia. Continue to follow with pain management.       Follow up:  6 mos    I spent 50 minutes on this encounter performing the following: reviewing the patient's medical record (clinic visits, hospital records, lab results, imaging and procedural documentation), history taking, physical exam and documentation on the date of the encounter. I also spent part of the time in coordination of care and counseling.        Ora Nova MD  Tx Hepatology  [facility] " "Center  ----------------------------------------------------------------------------------------------------------------------------------------------------------------------------------------------------------------------------------------------------------------  Subjective:            54 year old male presenting for routine post OLT follow up.   Last seen by Dr. Muniz in October 2024.    Since he was last seen, Mr. Lopez is feeling okay.  No hospitalizations.   Still mainly dealing with \"nerve\" pain and pain.  Since last visit he was started on Lyrica for pain, as well as tamsulosin for some hesitancy.   Wife concerned about renal function and lab checks.    He denies any s/s referrable to graft dysfunction or medication SEs. Specifically, denies ab pain, N/V, melena, BRBPR, jaundice, pruritus, fluid retention, episodes of confusion, or day-night reversal, fevers, chills, unintentional weight loss, CP, SOB/MOE, HA, tremors.        History of Present Illness   Mary Lopez is a 54 year old male s/p OLT 3/5/24 for EtOH/A1AT MZ/C282Y het cirrhosis.   Transplant course has been c/b by early T cell mediated rejection; anastomotic stricture, now on stent-free trial; pain; and renal insufficiency.    He is now on CSA monotherapy, transitioned from FK early on d/t possible neuro side effects. He tapered off low dose Myfortic over the Winter 2025.   He typically takes his PM dose of CSA at 7P.    Graft function remains excellent.       Medications:  Current Outpatient Medications   Medication Sig Dispense Refill    ACE/ARB/ARNI NOT PRESCRIBED (INTENTIONAL) Please choose reason not prescribed from choices below.      blood glucose (NO BRAND SPECIFIED) test strip Use to test blood sugar two times daily or as directed. To accompany: Blood Glucose Monitor Brands: per insurance. 100 strip 6    blood glucose monitoring (NO BRAND SPECIFIED) meter device kit Use to test blood sugar twice times daily or as directed. " Preferred blood glucose meter OR supplies to accompany: Blood Glucose Monitor Brands: per insurance. 1 kit 0    Continuous Glucose Sensor (DEXCOM G7 SENSOR) MISC Change every 10 days. 9 each 3    cycloSPORINE modified (GENERIC EQUIVALENT) 100 MG capsule Take 1 capsule (100 mg) by mouth every 12 hours. Total dose 125 mg BID 60 capsule 11    diclofenac (VOLTAREN) 1 % topical gel Apply 4 grams to knees four times daily using enclosed dosing card. 100 g 1    empagliflozin (JARDIANCE) 25 MG TABS tablet Take 1 tablet (25 mg) by mouth daily. 90 tablet 1    Glucagon (GVOKE HYPOPEN) 1 MG/0.2ML pen Inject the contents of 1 device under the skin into lower abdomen, outer thigh, or outer upper arm as needed for hypoglycemia. If no response after 15 minutes, additional 1 mg dose from a new device may be injected while waiting for emergency assistance. 0.4 mL 1    Injection Device for insulin (CEQUR SIMPLICITY 2U) KHUSHBOO 1 each every 3 days 10 each 5    insulin degludec (TRESIBA FLEXTOUCH) 100 UNIT/ML pen Inject 16 units subcutaneous each am. Please do NOT fill today ( 9/4/2024 ). 15 mL 5    Insulin Lispro-aabc, 1 U Dial, (LYUMJEV KWIKPEN) 100 UNIT/ML SOPN Inject 6 Units Subcutaneous 3 times daily (with meals) 6 units with meals, plus correction. Pt may use up to 30 units daily. This REPLACES Humalog/Novolog insulin. 30 mL 2    insulin pen needle (29G X 12.7MM) 29G X 12.7MM miscellaneous For insulin administration 4x daily. 400 each 3    insulin pen needle (32G X 4 MM) 32G X 4 MM miscellaneous Use as directed by provider Per insurance coverage 200 each 1    insulin pen needle (BD PEN NEEDLE SHERRIE 2ND GEN) 32G X 4 MM miscellaneous USES 5 PER  each 3    methocarbamol (ROBAXIN) 750 MG tablet Take 1 tablet (750 mg) by mouth 3 times daily as needed for muscle spasms. 15 tablet 0    multivitamin w/minerals (THERA-VIT-M) tablet Take 1 tablet by mouth daily. 90 tablet 3    mycophenolic acid (GENERIC EQUIVALENT) 360 MG EC tablet Take  "1 tablet (360 mg) by mouth 2 times daily. 60 tablet 5    omeprazole (PRILOSEC) 20 MG DR capsule Take 20 mg by mouth daily 180 capsule 1    pregabalin (LYRICA) 75 MG capsule Take 1 capsule (75 mg) by mouth 2 times daily. 60 capsule 1    QUEtiapine (SEROQUEL) 25 MG tablet Take 25 mg by mouth at bedtime.      sodium zirconium cyclosilicate (LOKELMA) 10 g PACK packet Take 1 packet (10 g) by mouth daily as needed (Hyperkalemia). 30 each 0    thin (NO BRAND SPECIFIED) lancets Use with lanceting device. To accompany: Blood Glucose Monitor Brands: per insurance. 100 each 6    Magnesium Bisglycinate (MAG GLYCINATE) 100 MG TABS Take 1 tablet (100 mg) by mouth 2 times daily 180 tablet 1    tamsulosin (FLOMAX) 0.4 MG capsule Take 1 capsule (0.4 mg) by mouth daily. 90 capsule 3    Thiamine Mononitrate (B1) 100 MG TABS Take 1 tablet by mouth daily at 2 pm.       Current Facility-Administered Medications   Medication Dose Route Frequency Provider Last Rate Last Admin    sodium chloride (PF) 0.9% PF flush 3 mL  3 mL Intravenous q1 min prn            Review of Systems  A complete 10 point review of systems was asked and answered in the negative unless specifically commented upon in the HPI    Objective:         Vitals:    04/01/25 0802   BP: 106/71   BP Location: Right arm   Patient Position: Sitting   Pulse: 71   SpO2: 95%   Weight: 104.8 kg (231 lb)   Height: 1.765 m (5' 9.5\")     Body mass index is 33.62 kg/m .     Physical Exam  Constitutional: Well-developed, well-nourished, in no apparent distress.    HEENT: Normocephalic. No scleral icterus.   Neck/Lymph: Normal ROM  Cardiac:  Regular rate  Respiratory: Normal Respiratory excursion  GI:  Abdomen soft, non distended, non-tender. Well healed OLT scar, no incisional hernias  Skin:  Skin is warm and dry. No rashes  Peripheral Vascular: no lower extremity edema.  Musculoskeletal:  ROM intact, adequate muscle bulk    Psychiatric: Normal mood and affect. Behavior is normal.  Neuro:  " no tremor, CN intact     Labs and Diagnostic tests:  Reviewed 3/27        Imaging:  CT 2/2025  Narrative & Impression   EXAM: CT ABDOMEN PELVIS W CONTRAST  LOCATION: [facility]  DATE: 2/17/2025     INDICATION:  Groin pain, right  COMPARISON: 3/30/2024  TECHNIQUE: CT scan of the abdomen and pelvis was performed following injection of IV contrast. Multiplanar reformats were obtained. Dose reduction techniques were used.  CONTRAST: 122ml isovue 370     FINDINGS:   LOWER CHEST: Normal.     HEPATOBILIARY: Status post liver transplantation. There is a very small fluid collection along the posterior inferior right hepatic lobe measuring approximately 3.1 x 1.6 cm may represent a postoperative seroma. There was a large fluid collection on the   prior study dated 3/30/2024 within this area.     PANCREAS: Normal.     SPLEEN: Spleen is enlarged measuring 15.3 cm.     ADRENAL GLANDS: Normal.     KIDNEYS/BLADDER: No significant mass, stone, or hydronephrosis.     BOWEL: Diverticulosis of the colon. No acute inflammatory change. No obstruction.      LYMPH NODES: Normal.     VASCULATURE: Large varices are noted within the left upper abdomen adjacent to the stomach and spleen.     PELVIC ORGANS: Tiny fat-containing umbilical hernia. Bladder within normal limits.     MUSCULOSKELETAL: Degenerative changes of the spine.                                                                      IMPRESSION:   1.  No acute findings within the abdomen and pelvis.  No inguinal hernia or other explanation for pain of right groin pain.   2.  Splenomegaly.       Procedures:    Colonoscopy:   1/2024 3mm transverse colon polyp (insufficient, possible early hyperplastic change, etc)

## 2025-04-07 RX ORDER — CYCLOSPORINE 100 MG/1
100 CAPSULE, LIQUID FILLED ORAL EVERY 12 HOURS
Qty: 60 CAPSULE | Refills: 11 | Status: SHIPPED | OUTPATIENT
Start: 2025-04-07

## 2025-04-11 NOTE — PROGRESS NOTES
Mary Lopez  :  1970  DOS: 2025  MRN: [id number]  PCP: Jimmie Hoyt    Sports Medicine Clinic Visit    Interim History - 2025  - Last seen on 1/10/2025 for bilateral rotator cuff tendinopathy.   - Bilateral subacromial corticosteroid injections completed on 2025 provided good relief for 2.5-3 months.    - Since the last visit, pain has started to be noticeable over the last week or so and worsening.  Similar in character and severity as last time.  Hopeful for repeat injections today for similar relief.   - No interim injury.     Interim History - January 10, 2025  - Last seen on 5/10/2024 for bilateral rotator cuff tendinopathy.   - Bilateral subacromial corticosteroid injections completed on 5/10/2024 provided 6 months of moderate to great relief in pain.    - Since the last visit, he notes a return in bilateral shoulder pain.  Similar in location and character to prior to the last injections.  Takes Tylenol on occasion.  Interested in repeat injections today in hopes of similar relief as last time.  - No interim injury.       Interim History - May 10, 2024  - Last seen on 2024 for bilateral shoulder pain.     - 5/3/2024 right shoulder MRI Impression:  1. Multifocal low to moderate grade intrasubstance tear of supraspinatus and infraspinatus. No high-grade rotator cuff tear. No muscle bulk loss.  2. Query posterosuperior labral tear.  3. Moderate nonsimple appearing pleural effusion, increased from comparison CT incompletely assessed. Consider further evaluation with chest CT if clinically indicated.    -5/3/2024 left shoulder MRI Impression:  1. Focal moderate grade intrasubstance tear of the supraspinatus/infraspinous junctional fibers at the footprint. No muscle atrophy.  2. Query superior and posterosuperior labral tear.  3.  Findings may be seen in clinical entity of adhesive capsulitis. Please correlate clinically.    - Since the last visit, he notes no change  in bilateral shoulder pain. Taking Tramadol only at night. No pain relieving measures during the day at this time. Hopeful for injections today.  Patient received approval from his transplant team and diabetes/endocrine for corticosteroid injections.  - No interim injury.     Initial Visit: April 19, 2024  HPI  Mary Lopez is a 53 year old male who is seen as a self referral presenting with right shoulder pain.    - Mechanism of Injury:    -  Fell in 10/2023 on the right shoulder, and has had pain on the anterior aspect that can travel down upper arm intermittently.  Left shoulder is now painful from overuse on the anterior aspect of shoulder.  Similar to the right shoulder but not as intense.   - Pertinent history and prior evaluations:    - XR R shoulder 12/22/2023 shows normal anatomic alignment without significant degenerative changes, no acute fractures or dislocations.  - Noted single left rib fracture on 09/2023  - Doing physical therapy for the right shoulder.   - Liver transplant patient (3/4/2024) secondary to alcoholic cirrhosis, with residual memory problems since surgery. Also with T2DM on long and short acting insulin, currently with large uncontrolled fluctuations in glucoses throughout the day, last HbA1c 5.6 two weeks ago.     - Pain Character:    - Location:  Bilateral shoulder  - Character:  sharp  - Duration:  for last 6 months  - Course:  steady  - Endorses:    - weakness due to pain, decreased ROM/flexibility, decreased joint mobility, decreased strength and impaired posture making it difficult to do ADL, work tasks, recreational activities  - Denies:    - clicking/popping, grinding, mechanical locking symptoms, instability, numbness, tingling  - Alleviating factors:    - rest, activity modifications, tylenol, muscle relaxers, lidocaine patches, Voltaren gel  - Aggravating factors:    - lifting heavy objects, overhead activities, handgrip activities  - Other treatments tried:    -  tylenol, lidocaine patches, Voltaren gel, flexeril    - Patient Goals:    - understand the cause of the symptoms, be able to manage the symptoms successfully  - Social History:   - On disability. Previous work:        Review of Systems  Musculoskeletal: as above  Remainder of review of systems is negative including constitutional, CV, pulmonary, GI, Skin and Neurologic except as noted in HPI or medical history.    Past Medical History:   Diagnosis Date    ANGEL (acute kidney injury)     Alcoholic cirrhosis of liver without ascites (H) 07/13/2023    Alcoholic hepatitis with ascites (H) 10/03/2023    Alcoholic hepatitis without ascites (H) 07/13/2023    Chronic generalized pain disorder 12/25/2024    CKD stage 3a, GFR 45-59 ml/min (H) 08/08/2024    Closed fracture of one rib of left side 09/14/2023    Concussion without loss of consciousness 03/11/2020    Decompensated hepatic cirrhosis (H) 09/15/2023    Diabetes mellitus, type 2 (H) 11/22/2023    Essential hypertension 03/11/2020    Ganglion cyst of wrist, right 04/18/2024    History of biliary duct stent placement 09/05/2024    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)     Liver replaced by transplant (H) 03/05/2024    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    ACR PRAJAPATI 6-7/9, treated with steroids    Mild hyperlipidemia 12/07/2021    Persistent insomnia 07/13/2023    Portal hypertension (H) 07/13/2023    Right inguinal hernia 08/08/2024    Scrotal abscess     Secondary esophageal 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 (H) 07/13/2023    Tobacco abuse disorder 03/11/2020    Type 2 diabetes mellitus with hyperglycemia (H) 12/22/2023     Past Surgical History:   Procedure Laterality Date    BENCH LIVER  3/5/2024    Procedure: Bench liver;  Surgeon: Ryder Cortez MD;  Location: [facility]    CHOLECYSTECTOMY      COLONOSCOPY N/A 1/2/2024    Procedure: COLONOSCOPY, WITH POLYPECTOMY;  Surgeon: Jak Urbina MD;  Location: [facility]    CV RIGHT HEART  CATH MEASUREMENTS RECORDED N/A 2024    Procedure: Right Heart Catheterization;  Surgeon: Alfred Tafoya MD;  Location: [facility]    ENDOSCOPIC RETROGRADE CHOLANGIOPANCREATOGRAM N/A 10/22/2024    Procedure: ENDOSCOPIC RETROGRADE CHOLANGIOPANCREATOGRAPHY, WITH bile duct stent stent exchange and balloon dilation;  Surgeon: Naldo Rodriguez MD;  Location: [facility]    ENDOSCOPIC RETROGRADE CHOLANGIOPANCREATOGRAM N/A 2024    Procedure: ENDOSCOPIC RETROGRADE CHOLANGIOPANCREATOGRAPHY BILLIARY DILATION AND STENT PLACEMENT;  Surgeon: Will Martínez MD;  Location: [facility]    ENDOSCOPIC RETROGRADE CHOLANGIOPANCREATOGRAPHY, EXCHANGE TUBE/STENT N/A 2024    Procedure: Endoscopic Retrograde Cholangiopancreatography with biliary sphincterotomy, balloon dilation, pancreatic stent placement,biliary stent exchange;  Surgeon: Naldo Rodriguez MD;  Location: [facility]    ENDOSCOPIC RETROGRADE CHOLANGIOPANCREATOGRAPHY, EXCHANGE TUBE/STENT N/A 2025    Procedure: ENDOSCOPIC RETROGRADE CHOLANGIOPANCREATOGRAPHY, WITH sludge removal, and stent removal;  Surgeon: Will Martínez MD;  Location: [facility]    ENTEROSCOPY SMALL BOWEL N/A 2024    Procedure: Enteroscopy small bowel;  Surgeon: Hugo Daigle MD;  Location: [facility]    ESOPHAGOSCOPY, GASTROSCOPY, DUODENOSCOPY (EGD), COMBINED N/A 2024    Procedure: Esophagoscopy, gastroscopy, duodenoscopy (EGD), combined;  Surgeon: Hugo Daigel MD;  Location: [facility]    HERNIORRHAPHY INGUINAL Right 2024    Procedure: Open Inguinal Hernia Repair;  Surgeon: Ryder Cortez MD;  Location: [facility]    IR LIVER BIOPSY PERCUTANEOUS  3/15/2024    IR RETROPERITONEAL ABSCESS DRAINAGE  2024    TONSILLECTOMY      TRANSPLANT LIVER RECIPIENT  DONOR N/A 3/5/2024    Procedure: Transplant liver recipient  donor, bile duct stent placement;  Surgeon: Ryder Cortez MD;  Location: [facility]    VASECTOMY       Family History    Problem Relation Age of Onset    Anxiety Disorder Mother     Depression Mother     Bipolar Disorder Mother     Chronic Obstructive Pulmonary Disease Mother     Lung Cancer Mother 81    Morbid Obesity Father     Diabetes Father     Diabetes Type 2  Brother     Substance Abuse Maternal Grandfather     Substance Abuse Paternal Grandfather     Colon Cancer No family hx of     Liver Disease No family hx of          Objective  There were no vitals taken for this visit.    General: healthy, alert and in no acute distress.    HEENT: no scleral icterus or conjunctival erythema.   Skin: no suspicious lesions or rash. No jaundice.   CV: regular rhythm by palpation, 2+ distal pulses.  Resp: normal respiratory effort without conversational dyspnea.   Psych: normal mood and affect.    Gait: nonantalgic, appropriate coordination and balance.     Neuro:        - Sensation to light touch:    - Intact throughout the BUE including all peripheral nerve distributions.        - MSR:       RUE  LUE  - Biceps  2+ 2+  - Brachioradialis 2+ 2+  - Triceps  2+ 2+       - Special tests:   - Spurling's:  Neg     MSK - Shoulder:       - Inspection:    - No significant swelling, erythema, warmth, ecchymosis, lesion, or atrophy noted.        - ROM:    - Full AROM/PROM with lateral shoulder pain during shoulder abduction, slightly with IR/ER       - Palpation:    - TTP at the lateral shoulder, subacromial space, rotator cuff insertion.   - NTTP elsewhere.        - Strength:  (*antalgic)  - Shoulder Abduction   5-*    - Shoulder Flexion   5-*    - Shoulder Internal Rotation  5-*    - Shoulder External Rotation  5-*   - Elbow Flexion   5   - Elbow Extension   5   - Forearm Pronation   5   - Forearm Supination   5   - Wrist Extension   5   - Wrist Flexion    5   - FDI     5   - ADM     5   - FPL     5   - APB     5   - EIP     5   - EDC     5   - APL/EPB    5            - Special tests:        - Santizo: Positive   - Neers: Positive   - Empty can:  Positive for pain and weakness    - [place]:  Neg    - Scarf:  Neg    - Speeds:  Neg    - Yergason:  Neg    - Apprehension/Relocation:  Neg       Radiology  I independently reviewed the available relevant imaging in the chart with my interpretations as above in HPI.   - XR B/L shoulders 4/19/2024 shows normal anatomic alignment without acute fracture or dislocation.  No significant degenerative changes.    Procedure  Large Joint Injection/Arthocentesis: bilateral subacromial bursa    Date/Time: 4/12/2025 10:08 AM    Performed by: Alex Rico DO  Authorized by: Alex Rico DO    Needle Size:  22 G  Guidance: ultrasound    Location:  Shoulder  Laterality:  Bilateral      Site:  Bilateral subacromial bursa  Medications (Right):  40 mg triamcinolone 40 MG/ML; 2 mL BUPivacaine 0.5 %; 2 mL lidocaine 1 %  Medications (Left):  40 mg triamcinolone 40 MG/ML; 2 mL BUPivacaine 0.5 %; 2 mL lidocaine 1 %  Outcome:  Tolerated well, no immediate complications  Procedure discussed: discussed risks, benefits, and alternatives    Consent Given by:  Patient  Timeout: timeout called immediately prior to procedure    Prep: patient was prepped and draped in usual sterile fashion      Subacromial Bursa - Ultrasound Guided  The patient was informed of the risks and the benefits of the procedure and a written consent was signed. The patient s shoulder was prepped with chlorhexidine in sterile fashion.   An injectate solution containing 2 mL of 1% lidocaine, 2 mL of 0.5% Bupivacaine, and 1 mL of Kenalog (40 mg/mL) was drawn up into a 5 mL syringe. Injection was performed using sterile technique.  Under ultrasound guidance a 1.5-inch 22-gauge needle was used to enter the subacromial bursa.  An anterolateral approach was used with arm held in neutral Crass position.  Needle placement was visualized and documented with ultrasound.  Ultrasound visualization was necessary to ensure placement in to the bursa and not the rotator cuff tendon  which could potentially cause further tendon damage.  Injection performed in-plane. Images were permanently stored for the patient's record. There were no complications. The patient tolerated the procedure well. There was negligible bleeding.  The procedure was duplicated on the contralateral side using an identical protocol.       Assessment  1. Nontraumatic incomplete tear of left rotator cuff    2. Nontraumatic incomplete tear of right rotator cuff          Plan  Mary Lopez is a pleasant 53 year old male that presents with chronic bilateral shoulder pain.  Right shoulder pain started after a fall directly onto the shoulder in October 2023.  He has had anterior shoulder pain since that time that will occasionally radiate down the arm.  He feels antalgic weakness in the shoulder and has been using it less and less due to the pain.  During that time, he has been overcompensating with the left shoulder and started to feel very similar symptoms on the left.  On exam there is positive impingement signs bilaterally with enough antalgic weakness to be concern for the possibility of rotator cuff tear, as well as the possibility of a more traumatic injury on the right.  He and wife are highly in favor of advanced imaging workup at this time and establishing a good short and long-term treatment plan for the shoulders while he is recovering from recent liver transplantation.     We discussed the nature of the condition and available treatment options, and mutually agreed upon the following plan:    - Imaging:          - Reviewed and independently interpreted the relevant imaging in the chart, including any imaging ordered for today's clinic.  - Reviewed results and images with patient.   -Will be important to establish the integrity of the rotator cuff complex with advanced imaging, MRI order has been placed for right and left shoulders and instructions given for scheduling the MRI and follow-up with me  afterward.  - Medications:          - Discussed pharmacologic options for pain relief.   - May use NSAIDs (Ibuprofen, Naproxen) or Acetaminophen (Tylenol) as needed for pain control.   - Do not take these if previously advised to avoid them for other medical conditions.  - May also use topical medications such as lidocaine, IcyHot, BioFreeze, or Voltaren gel as needed for pain control.    - Voltaren gel is an anti-inflammatory cream that may be used up to 4 times per day over the painful area.   - Injections:          - Discussed possible injection options and alternatives.    - Injection options include: Possibility for intra-articular glenohumeral joint injection, subacromial/subdeltoid bursa injection with corticosteroid if okay with transplant team.    - Deferred injections today and will consider them in the future as needed.   - Therapy:          - Discussed the benefits of therapy vs home exercise program for optimization of range of motion, flexibility, strength, stability and function.   - Preference is for therapy.   -Physical therapy referral placed today and instructed to call [phone] to schedule appointments.   - Modalities:          - May use ice, heat, massage or other modalities as needed.  - Surgery:          - Discussed non-operative and operative treatment options for the patient's condition. Goal is to continue conservative care for as long as possible before surgical intervention would need to be considered.  - Activity:          - Encouraged to remain active and participate in regular activities as symptoms allow.   Avoid or modify exacerbating activities as needed.  - Follow up:          - When results of the advanced imaging are available to discuss the results and next steps in treatment.   - May follow up sooner for new/worsening symptoms.  - May contact clinic by phone or MyChart for questions or concerns.     Updated Plan - 5/10/24:  Mary presents to discuss results of his recent  MRI which shows rotator cuff tear partial tearing of the supraspinatus and infraspinatus bilaterally, possible labral tears as well.  He is interested in obtaining corticosteroid injections of bilateral subacromial bursa.  Subacromial bursa injections were performed today in clinic without complication, patient tolerated procedure well.  Also placed an order for physical therapy and instructions given for scheduling therapy appointments.  May contact clinic for questions/concerns.      Updated Plan - 4/12/25:  Mary presents today to follow-up for his bilateral shoulder pain secondary to rotator cuff partial tearing bilaterally.  Subacromial bursa corticosteroid injections given at last visit provided 3+ months of great relief of his symptoms.  Over the past few weeks, his pain has seemed to return in similar character as it was before the last injections.  He is interested in repeat injections today in hopes of gaining similar relief as last time.  Ultrasound-guided bilateral subacromial bursae corticosteroid injections were given today in clinic without complication, patient tolerated procedure well.  Follow-up as needed in the future.      Alex Rico DO, RAYNEM  [facility] Sports Medicine  [facility] - Department of Orthopedic Surgery       Disclaimer:  This note was prepared and written using Dragon Medical dictation software. As a result, there may be errors in the script that have gone undetected. Please consider this when interpreting the information in this note.

## 2025-04-12 ENCOUNTER — OFFICE VISIT (OUTPATIENT)
Dept: ORTHOPEDICS | Facility: CLINIC | Age: 55
End: 2025-04-12
Payer: COMMERCIAL

## 2025-04-12 DIAGNOSIS — M75.111 NONTRAUMATIC INCOMPLETE TEAR OF RIGHT ROTATOR CUFF: ICD-10-CM

## 2025-04-12 DIAGNOSIS — M75.112 NONTRAUMATIC INCOMPLETE TEAR OF LEFT ROTATOR CUFF: Primary | ICD-10-CM

## 2025-04-12 PROCEDURE — 99213 OFFICE O/P EST LOW 20 MIN: CPT | Mod: 25 | Performed by: STUDENT IN AN ORGANIZED HEALTH CARE EDUCATION/TRAINING PROGRAM

## 2025-04-12 PROCEDURE — 20611 DRAIN/INJ JOINT/BURSA W/US: CPT | Mod: 50 | Performed by: STUDENT IN AN ORGANIZED HEALTH CARE EDUCATION/TRAINING PROGRAM

## 2025-04-12 RX ORDER — BUPIVACAINE HYDROCHLORIDE 5 MG/ML
2 INJECTION, SOLUTION PERINEURAL
Status: COMPLETED | OUTPATIENT
Start: 2025-04-12 | End: 2025-04-12

## 2025-04-12 RX ORDER — LIDOCAINE HYDROCHLORIDE 10 MG/ML
2 INJECTION, SOLUTION INFILTRATION; PERINEURAL
Status: COMPLETED | OUTPATIENT
Start: 2025-04-12 | End: 2025-04-12

## 2025-04-12 RX ORDER — TRIAMCINOLONE ACETONIDE 40 MG/ML
40 INJECTION, SUSPENSION INTRA-ARTICULAR; INTRAMUSCULAR
Status: COMPLETED | OUTPATIENT
Start: 2025-04-12 | End: 2025-04-12

## 2025-04-12 RX ADMIN — TRIAMCINOLONE ACETONIDE 40 MG: 40 INJECTION, SUSPENSION INTRA-ARTICULAR; INTRAMUSCULAR at 10:08

## 2025-04-12 RX ADMIN — LIDOCAINE HYDROCHLORIDE 2 ML: 10 INJECTION, SOLUTION INFILTRATION; PERINEURAL at 10:08

## 2025-04-12 RX ADMIN — BUPIVACAINE HYDROCHLORIDE 2 ML: 5 INJECTION, SOLUTION PERINEURAL at 10:08

## 2025-04-12 NOTE — NURSING NOTE
[facility]   ORTHOPEDICS & SPORTS MEDICINE  [address] [place] [address]  Dept: [phone]  ______________________________________________________________________________    Patient: Mary Lopez   : 1970   MRN: [id number]   2025    INVASIVE PROCEDURE SAFETY CHECKLIST    Date: 25   Procedure: Bilateral Subacromial Bursa Injections  Patient Name: Mary Lopez  MRN: [id number]  YOB: 1970    Action: Complete sections as appropriate. Any discrepancy results in a HARD COPY until resolved.     PRE PROCEDURE:  Patient ID verified with 2 identifiers (name and  or MRN): Yes  Procedure and site verified with patient/designee (when able): Yes  Accurate consent documentation in medical record: Yes  H&P (or appropriate assessment) documented in medical record: Yes  H&P must be up to 20 days prior to procedure and updates within 24 hours of procedure as applicable: NA  Relevant diagnostic and radiology test results appropriately labeled and displayed as applicable: NA  Procedure site(s) marked with provider initials: NA    TIMEOUT:  Time-Out performed immediately prior to starting procedure, including verbal and active participation of all team members addressing the following:Yes  * Correct patient identify  * Confirmed that the correct side and site are marked  * An accurate procedure consent form  * Agreement on the procedure to be done  * Correct patient position  * Relevant images and results are properly labeled and appropriately displayed  * The need to administer antibiotics or fluids for irrigation purposes during the procedure as applicable   * Safety precautions based on patient history or medication use    DURING PROCEDURE: Verification of correct person, site, and procedures any time the responsibility for care of the patient is transferred to another member of the care team.       Prior to injection, verified patient identity using  patient's name and date of birth.  Due to injection administration, patient instructed to remain in clinic for 15 minutes  afterwards, and to report any adverse reaction to me immediately.    Bursa injection was performed.      Drug Amount Wasted:  None.  Vial/Syringe: Single dose vial  Expiration Date:  07/2026       Sampson Rice, [facility]  April 12, 2025

## 2025-04-12 NOTE — LETTER
2025      Mary Lopez  [address]  [place] [address]      Dear Colleague,    Thank you for referring your patient, Mary Lopez, to the [facility] [place]. Please see a copy of my visit note below.    Mary Lopez  :  1970  DOS: 2025  MRN: [id number]  PCP: Jimmie Hoyt    Sports Medicine Clinic Visit    Interim History - 2025  - Last seen on 1/10/2025 for bilateral rotator cuff tendinopathy.   - Bilateral subacromial corticosteroid injections completed on 2025 provided good relief for 2.5-3 months.    - Since the last visit, pain has started to be noticeable over the last week or so and worsening.  Similar in character and severity as last time.  Hopeful for repeat injections today for similar relief.   - No interim injury.     Interim History - January 10, 2025  - Last seen on 5/10/2024 for bilateral rotator cuff tendinopathy.   - Bilateral subacromial corticosteroid injections completed on 5/10/2024 provided 6 months of moderate to great relief in pain.    - Since the last visit, he notes a return in bilateral shoulder pain.  Similar in location and character to prior to the last injections.  Takes Tylenol on occasion.  Interested in repeat injections today in hopes of similar relief as last time.  - No interim injury.       Interim History - May 10, 2024  - Last seen on 2024 for bilateral shoulder pain.     - 5/3/2024 right shoulder MRI Impression:  1. Multifocal low to moderate grade intrasubstance tear of supraspinatus and infraspinatus. No high-grade rotator cuff tear. No muscle bulk loss.  2. Query posterosuperior labral tear.  3. Moderate nonsimple appearing pleural effusion, increased from comparison CT incompletely assessed. Consider further evaluation with chest CT if clinically indicated.    -5/3/2024 left shoulder MRI Impression:  1. Focal moderate grade intrasubstance tear of the  supraspinatus/infraspinous junctional fibers at the footprint. No muscle atrophy.  2. Query superior and posterosuperior labral tear.  3.  Findings may be seen in clinical entity of adhesive capsulitis. Please correlate clinically.    - Since the last visit, he notes no change in bilateral shoulder pain. Taking Tramadol only at night. No pain relieving measures during the day at this time. Hopeful for injections today.  Patient received approval from his transplant team and diabetes/endocrine for corticosteroid injections.  - No interim injury.     Initial Visit: April 19, 2024  HPI  Mary Lopez is a 53 year old male who is seen as a self referral presenting with right shoulder pain.    - Mechanism of Injury:    -  Fell in 10/2023 on the right shoulder, and has had pain on the anterior aspect that can travel down upper arm intermittently.  Left shoulder is now painful from overuse on the anterior aspect of shoulder.  Similar to the right shoulder but not as intense.   - Pertinent history and prior evaluations:    - XR R shoulder 12/22/2023 shows normal anatomic alignment without significant degenerative changes, no acute fractures or dislocations.  - Noted single left rib fracture on 09/2023  - Doing physical therapy for the right shoulder.   - Liver transplant patient (3/4/2024) secondary to alcoholic cirrhosis, with residual memory problems since surgery. Also with T2DM on long and short acting insulin, currently with large uncontrolled fluctuations in glucoses throughout the day, last HbA1c 5.6 two weeks ago.     - Pain Character:    - Location:  Bilateral shoulder  - Character:  sharp  - Duration:  for last 6 months  - Course:  steady  - Endorses:    - weakness due to pain, decreased ROM/flexibility, decreased joint mobility, decreased strength and impaired posture making it difficult to do ADL, work tasks, recreational activities  - Denies:    - clicking/popping, grinding, mechanical locking  symptoms, instability, numbness, tingling  - Alleviating factors:    - rest, activity modifications, tylenol, muscle relaxers, lidocaine patches, Voltaren gel  - Aggravating factors:    - lifting heavy objects, overhead activities, handgrip activities  - Other treatments tried:    - tylenol, lidocaine patches, Voltaren gel, flexeril    - Patient Goals:    - understand the cause of the symptoms, be able to manage the symptoms successfully  - Social History:   - On disability. Previous work:        Review of Systems  Musculoskeletal: as above  Remainder of review of systems is negative including constitutional, CV, pulmonary, GI, Skin and Neurologic except as noted in HPI or medical history.    Past Medical History:   Diagnosis Date     ANGEL (acute kidney injury)      Alcoholic cirrhosis of liver without ascites (H) 07/13/2023     Alcoholic hepatitis with ascites (H) 10/03/2023     Alcoholic hepatitis without ascites (H) 07/13/2023     Chronic generalized pain disorder 12/25/2024     CKD stage 3a, GFR 45-59 ml/min (H) 08/08/2024     Closed fracture of one rib of left side 09/14/2023     Concussion without loss of consciousness 03/11/2020     Decompensated hepatic cirrhosis (H) 09/15/2023     Diabetes mellitus, type 2 (H) 11/22/2023     Essential hypertension 03/11/2020     Ganglion cyst of wrist, right 04/18/2024     History of biliary duct stent placement 09/05/2024     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)      Liver replaced by transplant (H) 03/05/2024     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    ACR PRAJAPATI 6-7/9, treated with steroids     Mild hyperlipidemia 12/07/2021     Persistent insomnia 07/13/2023     Portal hypertension (H) 07/13/2023     Right inguinal hernia 08/08/2024     Scrotal abscess      Secondary esophageal 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 (H) 07/13/2023     Tobacco abuse disorder 03/11/2020     Type 2 diabetes mellitus with hyperglycemia (H) 12/22/2023     Past  Surgical History:   Procedure Laterality Date     BENCH LIVER  3/5/2024    Procedure: Bench liver;  Surgeon: Ryder Cortez MD;  Location: [facility] OR     CHOLECYSTECTOMY       COLONOSCOPY N/A 1/2/2024    Procedure: COLONOSCOPY, WITH POLYPECTOMY;  Surgeon: Jak Urbina MD;  Location: [facility]     CV RIGHT HEART CATH MEASUREMENTS RECORDED N/A 1/30/2024    Procedure: Right Heart Catheterization;  Surgeon: Alfred Tafoya MD;  Location:  HEART CARDIAC CATH LAB     ENDOSCOPIC RETROGRADE CHOLANGIOPANCREATOGRAM N/A 10/22/2024    Procedure: ENDOSCOPIC RETROGRADE CHOLANGIOPANCREATOGRAPHY, WITH bile duct stent stent exchange and balloon dilation;  Surgeon: Naldo Rodriguez MD;  Location: [facility] OR     ENDOSCOPIC RETROGRADE CHOLANGIOPANCREATOGRAM N/A 12/17/2024    Procedure: ENDOSCOPIC RETROGRADE CHOLANGIOPANCREATOGRAPHY BILLIARY DILATION AND STENT PLACEMENT;  Surgeon: Will Martínez MD;  Location: [facility] OR     ENDOSCOPIC RETROGRADE CHOLANGIOPANCREATOGRAPHY, EXCHANGE TUBE/STENT N/A 8/13/2024    Procedure: Endoscopic Retrograde Cholangiopancreatography with biliary sphincterotomy, balloon dilation, pancreatic stent placement,biliary stent exchange;  Surgeon: Naldo Rodriguez MD;  Location: [facility]     ENDOSCOPIC RETROGRADE CHOLANGIOPANCREATOGRAPHY, EXCHANGE TUBE/STENT N/A 2/13/2025    Procedure: ENDOSCOPIC RETROGRADE CHOLANGIOPANCREATOGRAPHY, WITH sludge removal, and stent removal;  Surgeon: Will Martínez MD;  Location: [facility] OR     ENTEROSCOPY SMALL BOWEL N/A 7/12/2024    Procedure: Enteroscopy small bowel;  Surgeon: Hugo Daigle MD;  Location: [facility]     ESOPHAGOSCOPY, GASTROSCOPY, DUODENOSCOPY (EGD), COMBINED N/A 7/12/2024    Procedure: Esophagoscopy, gastroscopy, duodenoscopy (EGD), combined;  Surgeon: Hugo Daigle MD;  Location: [facility]     HERNIORRHAPHY INGUINAL Right 9/18/2024    Procedure: Open Inguinal Hernia Repair;  Surgeon: Ryder Cortez MD;  Location: [facility]     IR LIVER  BIOPSY PERCUTANEOUS  3/15/2024     IR RETROPERITONEAL ABSCESS DRAINAGE  2024     TONSILLECTOMY       TRANSPLANT LIVER RECIPIENT  DONOR N/A 3/5/2024    Procedure: Transplant liver recipient  donor, bile duct stent placement;  Surgeon: Ryder Cortez MD;  Location: [facility]     VASECTOMY       Family History   Problem Relation Age of Onset     Anxiety Disorder Mother      Depression Mother      Bipolar Disorder Mother      Chronic Obstructive Pulmonary Disease Mother      Lung Cancer Mother 81     Morbid Obesity Father      Diabetes Father      Diabetes Type 2  Brother      Substance Abuse Maternal Grandfather      Substance Abuse Paternal Grandfather      Colon Cancer No family hx of      Liver Disease No family hx of          Objective  There were no vitals taken for this visit.    General: healthy, alert and in no acute distress.    HEENT: no scleral icterus or conjunctival erythema.   Skin: no suspicious lesions or rash. No jaundice.   CV: regular rhythm by palpation, 2+ distal pulses.  Resp: normal respiratory effort without conversational dyspnea.   Psych: normal mood and affect.    Gait: nonantalgic, appropriate coordination and balance.     Neuro:        - Sensation to light touch:    - Intact throughout the BUE including all peripheral nerve distributions.        - MSR:       RUE  LUE  - Biceps  2+ 2+  - Brachioradialis 2+ 2+  - Triceps  2+ 2+       - Special tests:   - Spurling's:  Neg     MSK - Shoulder:       - Inspection:    - No significant swelling, erythema, warmth, ecchymosis, lesion, or atrophy noted.        - ROM:    - Full AROM/PROM with lateral shoulder pain during shoulder abduction, slightly with IR/ER       - Palpation:    - TTP at the lateral shoulder, subacromial space, rotator cuff insertion.   - NTTP elsewhere.        - Strength:  (*antalgic)  - Shoulder Abduction   5-*    - Shoulder Flexion   5-*    - Shoulder Internal Rotation  5-*    - Shoulder External  Rotation  5-*   - Elbow Flexion   5   - Elbow Extension   5   - Forearm Pronation   5   - Forearm Supination   5   - Wrist Extension   5   - Wrist Flexion    5   - FDI     5   - ADM     5   - FPL     5   - APB     5   - EIP     5   - EDC     5   - APL/EPB    5            - Special tests:        - Santizo: Positive   - Neers: Positive   - Empty can: Positive for pain and weakness    - [place]:  Neg    - Scarf:  Neg    - Speeds:  Neg    - Yergason:  Neg    - Apprehension/Relocation:  Neg       Radiology  I independently reviewed the available relevant imaging in the chart with my interpretations as above in HPI.   - XR B/L shoulders 4/19/2024 shows normal anatomic alignment without acute fracture or dislocation.  No significant degenerative changes.    Procedure  Large Joint Injection/Arthocentesis: bilateral subacromial bursa    Date/Time: 4/12/2025 10:08 AM    Performed by: Alex Rico DO  Authorized by: Alex Rico DO    Needle Size:  22 G  Guidance: ultrasound    Location:  Shoulder  Laterality:  Bilateral      Site:  Bilateral subacromial bursa  Medications (Right):  40 mg triamcinolone 40 MG/ML; 2 mL BUPivacaine 0.5 %; 2 mL lidocaine 1 %  Medications (Left):  40 mg triamcinolone 40 MG/ML; 2 mL BUPivacaine 0.5 %; 2 mL lidocaine 1 %  Outcome:  Tolerated well, no immediate complications  Procedure discussed: discussed risks, benefits, and alternatives    Consent Given by:  Patient  Timeout: timeout called immediately prior to procedure    Prep: patient was prepped and draped in usual sterile fashion      Subacromial Bursa - Ultrasound Guided  The patient was informed of the risks and the benefits of the procedure and a written consent was signed. The patient s shoulder was prepped with chlorhexidine in sterile fashion.   An injectate solution containing 2 mL of 1% lidocaine, 2 mL of 0.5% Bupivacaine, and 1 mL of Kenalog (40 mg/mL) was drawn up into a 5 mL syringe. Injection was performed using sterile  technique.  Under ultrasound guidance a 1.5-inch 22-gauge needle was used to enter the subacromial bursa.  An anterolateral approach was used with arm held in neutral Crass position.  Needle placement was visualized and documented with ultrasound.  Ultrasound visualization was necessary to ensure placement in to the bursa and not the rotator cuff tendon which could potentially cause further tendon damage.  Injection performed in-plane. Images were permanently stored for the patient's record. There were no complications. The patient tolerated the procedure well. There was negligible bleeding.  The procedure was duplicated on the contralateral side using an identical protocol.       Assessment  1. Nontraumatic incomplete tear of left rotator cuff    2. Nontraumatic incomplete tear of right rotator cuff          Plan  Mary Lopez is a pleasant 53 year old male that presents with chronic bilateral shoulder pain.  Right shoulder pain started after a fall directly onto the shoulder in October 2023.  He has had anterior shoulder pain since that time that will occasionally radiate down the arm.  He feels antalgic weakness in the shoulder and has been using it less and less due to the pain.  During that time, he has been overcompensating with the left shoulder and started to feel very similar symptoms on the left.  On exam there is positive impingement signs bilaterally with enough antalgic weakness to be concern for the possibility of rotator cuff tear, as well as the possibility of a more traumatic injury on the right.  He and wife are highly in favor of advanced imaging workup at this time and establishing a good short and long-term treatment plan for the shoulders while he is recovering from recent liver transplantation.     We discussed the nature of the condition and available treatment options, and mutually agreed upon the following plan:    - Imaging:          - Reviewed and independently interpreted the  relevant imaging in the chart, including any imaging ordered for today's clinic.  - Reviewed results and images with patient.   -Will be important to establish the integrity of the rotator cuff complex with advanced imaging, MRI order has been placed for right and left shoulders and instructions given for scheduling the MRI and follow-up with me afterward.  - Medications:          - Discussed pharmacologic options for pain relief.   - May use NSAIDs (Ibuprofen, Naproxen) or Acetaminophen (Tylenol) as needed for pain control.   - Do not take these if previously advised to avoid them for other medical conditions.  - May also use topical medications such as lidocaine, IcyHot, BioFreeze, or Voltaren gel as needed for pain control.    - Voltaren gel is an anti-inflammatory cream that may be used up to 4 times per day over the painful area.   - Injections:          - Discussed possible injection options and alternatives.    - Injection options include: Possibility for intra-articular glenohumeral joint injection, subacromial/subdeltoid bursa injection with corticosteroid if okay with transplant team.    - Deferred injections today and will consider them in the future as needed.   - Therapy:          - Discussed the benefits of therapy vs home exercise program for optimization of range of motion, flexibility, strength, stability and function.   - Preference is for therapy.   -Physical therapy referral placed today and instructed to call [phone] to schedule appointments.   - Modalities:          - May use ice, heat, massage or other modalities as needed.  - Surgery:          - Discussed non-operative and operative treatment options for the patient's condition. Goal is to continue conservative care for as long as possible before surgical intervention would need to be considered.  - Activity:          - Encouraged to remain active and participate in regular activities as symptoms allow.   Avoid or modify exacerbating  activities as needed.  - Follow up:          - When results of the advanced imaging are available to discuss the results and next steps in treatment.   - May follow up sooner for new/worsening symptoms.  - May contact clinic by phone or MyChart for questions or concerns.     Updated Plan - 5/10/24:  Mary presents to discuss results of his recent MRI which shows rotator cuff tear partial tearing of the supraspinatus and infraspinatus bilaterally, possible labral tears as well.  He is interested in obtaining corticosteroid injections of bilateral subacromial bursa.  Subacromial bursa injections were performed today in clinic without complication, patient tolerated procedure well.  Also placed an order for physical therapy and instructions given for scheduling therapy appointments.  May contact clinic for questions/concerns.      Updated Plan - 4/12/25:  Mary presents today to follow-up for his bilateral shoulder pain secondary to rotator cuff partial tearing bilaterally.  Subacromial bursa corticosteroid injections given at last visit provided 3+ months of great relief of his symptoms.  Over the past few weeks, his pain has seemed to return in similar character as it was before the last injections.  He is interested in repeat injections today in hopes of gaining similar relief as last time.  Ultrasound-guided bilateral subacromial bursae corticosteroid injections were given today in clinic without complication, patient tolerated procedure well.  Follow-up as needed in the future.      Alex Rico DO, CAQSM  [facility]  [facility] - Department of Orthopedic Surgery       Disclaimer:  This note was prepared and written using Dragon Medical dictation software. As a result, there may be errors in the script that have gone undetected. Please consider this when interpreting the information in this note.       Again, thank you for allowing me to participate in the care of  your patient.        Sincerely,        Alex Rico, DO    Electronically signed

## 2025-04-13 ENCOUNTER — LAB (OUTPATIENT)
Dept: LAB | Facility: CLINIC | Age: 55
End: 2025-04-13
Payer: COMMERCIAL

## 2025-04-13 DIAGNOSIS — K86.9 DIABETES MELLITUS ASSOCIATED WITH PANCREATIC DISEASE (H): Primary | ICD-10-CM

## 2025-04-13 DIAGNOSIS — E11.69 DIABETES MELLITUS ASSOCIATED WITH PANCREATIC DISEASE (H): Primary | ICD-10-CM

## 2025-04-13 DIAGNOSIS — K70.11 ALCOHOLIC HEPATITIS WITH ASCITES (H): Chronic | ICD-10-CM

## 2025-04-13 DIAGNOSIS — Z94.4 LIVER REPLACED BY TRANSPLANT (H): Chronic | ICD-10-CM

## 2025-04-13 DIAGNOSIS — D70.9 NEUTROPENIA: ICD-10-CM

## 2025-04-13 LAB
EST. AVERAGE GLUCOSE BLD GHB EST-MCNC: 192 MG/DL
HBA1C MFR BLD: 8.3 % (ref 0–5.6)

## 2025-04-13 PROCEDURE — 80158 DRUG ASSAY CYCLOSPORINE: CPT

## 2025-04-13 PROCEDURE — 83036 HEMOGLOBIN GLYCOSYLATED A1C: CPT

## 2025-04-13 PROCEDURE — 36415 COLL VENOUS BLD VENIPUNCTURE: CPT

## 2025-04-14 LAB
CYCLOSPORINE BLD LC/MS/MS-MCNC: 86 UG/L (ref 50–400)
TME LAST DOSE: NORMAL H
TME LAST DOSE: NORMAL H

## 2025-04-15 ENCOUNTER — LAB (OUTPATIENT)
Dept: LAB | Facility: CLINIC | Age: 55
End: 2025-04-15
Payer: COMMERCIAL

## 2025-04-15 DIAGNOSIS — K70.11 ALCOHOLIC HEPATITIS WITH ASCITES (H): Chronic | ICD-10-CM

## 2025-04-15 DIAGNOSIS — Z94.4 LIVER REPLACED BY TRANSPLANT (H): Chronic | ICD-10-CM

## 2025-04-15 DIAGNOSIS — D70.9 NEUTROPENIA: ICD-10-CM

## 2025-04-15 LAB
ALBUMIN MFR UR ELPH: <6 MG/DL
ALBUMIN SERPL BCG-MCNC: 4 G/DL (ref 3.5–5.2)
ALBUMIN UR-MCNC: NEGATIVE MG/DL
ALP SERPL-CCNC: 236 U/L (ref 40–150)
ALT SERPL W P-5'-P-CCNC: 23 U/L (ref 0–70)
ANION GAP SERPL CALCULATED.3IONS-SCNC: 10 MMOL/L (ref 7–15)
APPEARANCE UR: CLEAR
AST SERPL W P-5'-P-CCNC: 23 U/L (ref 0–45)
BILIRUB DIRECT SERPL-MCNC: 0.13 MG/DL (ref 0–0.3)
BILIRUB SERPL-MCNC: 0.3 MG/DL
BILIRUB UR QL STRIP: NEGATIVE
BUN SERPL-MCNC: 24.8 MG/DL (ref 6–20)
CALCIUM SERPL-MCNC: 9.6 MG/DL (ref 8.8–10.4)
CHLORIDE SERPL-SCNC: 103 MMOL/L (ref 98–107)
CHOLEST SERPL-MCNC: 140 MG/DL
COLOR UR AUTO: ABNORMAL
CREAT SERPL-MCNC: 1.39 MG/DL (ref 0.67–1.17)
CREAT UR-MCNC: 51 MG/DL
CYCLOSPORINE BLD LC/MS/MS-MCNC: 96 UG/L (ref 50–400)
EGFRCR SERPLBLD CKD-EPI 2021: 61 ML/MIN/1.73M2
FASTING STATUS PATIENT QL REPORTED: NO
FASTING STATUS PATIENT QL REPORTED: NO
GLUCOSE SERPL-MCNC: 216 MG/DL (ref 70–99)
GLUCOSE UR STRIP-MCNC: >=1000 MG/DL
HCO3 SERPL-SCNC: 24 MMOL/L (ref 22–29)
HDLC SERPL-MCNC: 42 MG/DL
HGB UR QL STRIP: NEGATIVE
KETONES UR STRIP-MCNC: NEGATIVE MG/DL
LDLC SERPL CALC-MCNC: 56 MG/DL
LEUKOCYTE ESTERASE UR QL STRIP: NEGATIVE
MAGNESIUM SERPL-MCNC: 2.1 MG/DL (ref 1.7–2.3)
NITRATE UR QL: NEGATIVE
NONHDLC SERPL-MCNC: 98 MG/DL
PH UR STRIP: 6 [PH] (ref 5–7)
PHOSPHATE SERPL-MCNC: 3.1 MG/DL (ref 2.5–4.5)
POTASSIUM SERPL-SCNC: 4 MMOL/L (ref 3.4–5.3)
PROT SERPL-MCNC: 7.4 G/DL (ref 6.4–8.3)
PROT/CREAT 24H UR: NORMAL MG/G{CREAT}
SODIUM SERPL-SCNC: 137 MMOL/L (ref 135–145)
SP GR UR STRIP: 1.03 (ref 1–1.03)
TME LAST DOSE: NORMAL H
TME LAST DOSE: NORMAL H
TRIGL SERPL-MCNC: 209 MG/DL
UROBILINOGEN UR STRIP-MCNC: NORMAL MG/DL

## 2025-04-15 PROCEDURE — 81003 URINALYSIS AUTO W/O SCOPE: CPT | Performed by: PATHOLOGY

## 2025-04-15 PROCEDURE — 80061 LIPID PANEL: CPT | Performed by: PATHOLOGY

## 2025-04-15 PROCEDURE — 80053 COMPREHEN METABOLIC PANEL: CPT | Performed by: PATHOLOGY

## 2025-04-15 PROCEDURE — 84100 ASSAY OF PHOSPHORUS: CPT | Performed by: PATHOLOGY

## 2025-04-15 PROCEDURE — 82248 BILIRUBIN DIRECT: CPT | Performed by: PATHOLOGY

## 2025-04-15 PROCEDURE — 83735 ASSAY OF MAGNESIUM: CPT | Performed by: PATHOLOGY

## 2025-04-15 PROCEDURE — 80158 DRUG ASSAY CYCLOSPORINE: CPT | Performed by: INTERNAL MEDICINE

## 2025-04-15 PROCEDURE — 99000 SPECIMEN HANDLING OFFICE-LAB: CPT | Performed by: PATHOLOGY

## 2025-04-15 PROCEDURE — 36415 COLL VENOUS BLD VENIPUNCTURE: CPT | Performed by: PATHOLOGY

## 2025-04-15 PROCEDURE — 87517 HEPATITIS B DNA QUANT: CPT | Performed by: INTERNAL MEDICINE

## 2025-04-15 PROCEDURE — 84156 ASSAY OF PROTEIN URINE: CPT | Performed by: PATHOLOGY

## 2025-04-16 ENCOUNTER — MYC MEDICAL ADVICE (OUTPATIENT)
Dept: FAMILY MEDICINE | Facility: CLINIC | Age: 55
End: 2025-04-16
Payer: COMMERCIAL

## 2025-04-16 LAB — HBV DNA SERPL NAA+PROBE-ACNC: NOT DETECTED IU/ML

## 2025-04-21 ENCOUNTER — MYC MEDICAL ADVICE (OUTPATIENT)
Dept: EDUCATION SERVICES | Facility: CLINIC | Age: 55
End: 2025-04-21
Payer: COMMERCIAL

## 2025-04-24 ENCOUNTER — TELEPHONE (OUTPATIENT)
Dept: ENDOCRINOLOGY | Facility: CLINIC | Age: 55
End: 2025-04-24
Payer: COMMERCIAL

## 2025-04-24 NOTE — TELEPHONE ENCOUNTER
Patient confirmed scheduled appointment:  Date: 7/31/25  Time: 8:30 am  Visit type: RETURN DIABETES  Provider: Geovanna Ferreira PA-C  Location: [facility]  Testing/imaging: N/A  Additional notes:  Spoke to patient and rescheduled appointment from 5/15 due to changes in the providers schedule.  This was his 3rd reschedule.    Home Dewey on 4/24/2025 at 11:32 AM

## 2025-04-29 ENCOUNTER — TELEPHONE (OUTPATIENT)
Dept: TRANSPLANT | Facility: CLINIC | Age: 55
End: 2025-04-29

## 2025-04-29 ENCOUNTER — LAB (OUTPATIENT)
Dept: LAB | Facility: CLINIC | Age: 55
End: 2025-04-29
Payer: COMMERCIAL

## 2025-04-29 DIAGNOSIS — K70.11 ALCOHOLIC HEPATITIS WITH ASCITES (H): Chronic | ICD-10-CM

## 2025-04-29 DIAGNOSIS — R79.89 ELEVATED LIVER FUNCTION TESTS: Primary | ICD-10-CM

## 2025-04-29 DIAGNOSIS — Z94.4 LIVER REPLACED BY TRANSPLANT (H): Chronic | ICD-10-CM

## 2025-04-29 LAB
ALBUMIN SERPL BCG-MCNC: 4.1 G/DL (ref 3.5–5.2)
ALP SERPL-CCNC: 199 U/L (ref 40–150)
ALT SERPL W P-5'-P-CCNC: 46 U/L (ref 0–70)
ANION GAP SERPL CALCULATED.3IONS-SCNC: 10 MMOL/L (ref 7–15)
AST SERPL W P-5'-P-CCNC: 54 U/L (ref 0–45)
BILIRUB DIRECT SERPL-MCNC: 0.23 MG/DL (ref 0–0.3)
BILIRUB SERPL-MCNC: 0.5 MG/DL
BUN SERPL-MCNC: 29.9 MG/DL (ref 6–20)
CALCIUM SERPL-MCNC: 9.4 MG/DL (ref 8.8–10.4)
CHLORIDE SERPL-SCNC: 107 MMOL/L (ref 98–107)
CREAT SERPL-MCNC: 1.46 MG/DL (ref 0.67–1.17)
CYCLOSPORINE BLD LC/MS/MS-MCNC: 326 UG/L (ref 50–400)
EGFRCR SERPLBLD CKD-EPI 2021: 57 ML/MIN/1.73M2
ERYTHROCYTE [DISTWIDTH] IN BLOOD BY AUTOMATED COUNT: 13.8 % (ref 10–15)
GLUCOSE SERPL-MCNC: 197 MG/DL (ref 70–99)
HCO3 SERPL-SCNC: 20 MMOL/L (ref 22–29)
HCT VFR BLD AUTO: 39.4 % (ref 40–53)
HGB BLD-MCNC: 13.4 G/DL (ref 13.3–17.7)
MAGNESIUM SERPL-MCNC: 1.8 MG/DL (ref 1.7–2.3)
MCH RBC QN AUTO: 30 PG (ref 26.5–33)
MCHC RBC AUTO-ENTMCNC: 34 G/DL (ref 31.5–36.5)
MCV RBC AUTO: 88 FL (ref 78–100)
PHOSPHATE SERPL-MCNC: 3.1 MG/DL (ref 2.5–4.5)
PLATELET # BLD AUTO: 250 10E3/UL (ref 150–450)
POTASSIUM SERPL-SCNC: 4.9 MMOL/L (ref 3.4–5.3)
PROT SERPL-MCNC: 7 G/DL (ref 6.4–8.3)
RBC # BLD AUTO: 4.47 10E6/UL (ref 4.4–5.9)
SODIUM SERPL-SCNC: 137 MMOL/L (ref 135–145)
TME LAST DOSE: NORMAL H
TME LAST DOSE: NORMAL H
WBC # BLD AUTO: 8.5 10E3/UL (ref 4–11)

## 2025-04-29 PROCEDURE — 83735 ASSAY OF MAGNESIUM: CPT

## 2025-04-29 PROCEDURE — G0480 DRUG TEST DEF 1-7 CLASSES: HCPCS | Mod: 90

## 2025-04-29 PROCEDURE — 80053 COMPREHEN METABOLIC PANEL: CPT

## 2025-04-29 PROCEDURE — 85027 COMPLETE CBC AUTOMATED: CPT

## 2025-04-29 PROCEDURE — 36415 COLL VENOUS BLD VENIPUNCTURE: CPT

## 2025-04-29 PROCEDURE — 82248 BILIRUBIN DIRECT: CPT

## 2025-04-29 PROCEDURE — 80158 DRUG ASSAY CYCLOSPORINE: CPT

## 2025-04-29 PROCEDURE — 84100 ASSAY OF PHOSPHORUS: CPT

## 2025-04-29 NOTE — TELEPHONE ENCOUNTER
"Called Mary as AST is up a bit.  He told me he has been \"working myself to a nub\" this past week - he felt sick (runny nose, aches and pains) the last few days.   He will repeat hepatic panel on Monday 5/5.  "

## 2025-04-30 NOTE — TELEPHONE ENCOUNTER
CSA level incorrect due to timing per Kaylee.  Will recheck this along w/ hepatic panel early next week.

## 2025-05-05 ENCOUNTER — TELEPHONE (OUTPATIENT)
Dept: TRANSPLANT | Facility: CLINIC | Age: 55
End: 2025-05-05

## 2025-05-05 ENCOUNTER — PATIENT OUTREACH (OUTPATIENT)
Dept: CARE COORDINATION | Facility: CLINIC | Age: 55
End: 2025-05-05

## 2025-05-05 ENCOUNTER — LAB (OUTPATIENT)
Dept: LAB | Facility: CLINIC | Age: 55
End: 2025-05-05
Payer: COMMERCIAL

## 2025-05-05 DIAGNOSIS — K70.11 ALCOHOLIC HEPATITIS WITH ASCITES (H): ICD-10-CM

## 2025-05-05 DIAGNOSIS — D70.9 NEUTROPENIA: ICD-10-CM

## 2025-05-05 DIAGNOSIS — R79.89 ELEVATED LIVER FUNCTION TESTS: ICD-10-CM

## 2025-05-05 DIAGNOSIS — Z94.4 LIVER REPLACED BY TRANSPLANT (H): ICD-10-CM

## 2025-05-05 LAB
ALBUMIN SERPL BCG-MCNC: 4.1 G/DL (ref 3.5–5.2)
ALP SERPL-CCNC: 187 U/L (ref 40–150)
ALT SERPL W P-5'-P-CCNC: 34 U/L (ref 0–70)
ANION GAP SERPL CALCULATED.3IONS-SCNC: 9 MMOL/L (ref 7–15)
AST SERPL W P-5'-P-CCNC: 39 U/L (ref 0–45)
BASOPHILS # BLD AUTO: 0.1 10E3/UL (ref 0–0.2)
BASOPHILS NFR BLD AUTO: 1 %
BILIRUB DIRECT SERPL-MCNC: 0.2 MG/DL (ref 0–0.3)
BILIRUB SERPL-MCNC: 0.4 MG/DL
BUN SERPL-MCNC: 24.5 MG/DL (ref 6–20)
CALCIUM SERPL-MCNC: 9.4 MG/DL (ref 8.8–10.4)
CHLORIDE SERPL-SCNC: 107 MMOL/L (ref 98–107)
CREAT SERPL-MCNC: 1.41 MG/DL (ref 0.67–1.17)
CYCLOSPORINE BLD LC/MS/MS-MCNC: 46 UG/L (ref 50–400)
EGFRCR SERPLBLD CKD-EPI 2021: 60 ML/MIN/1.73M2
EOSINOPHIL # BLD AUTO: 0.4 10E3/UL (ref 0–0.7)
EOSINOPHIL NFR BLD AUTO: 5 %
ERYTHROCYTE [DISTWIDTH] IN BLOOD BY AUTOMATED COUNT: 13.6 % (ref 10–15)
GLUCOSE SERPL-MCNC: 171 MG/DL (ref 70–99)
HCO3 SERPL-SCNC: 22 MMOL/L (ref 22–29)
HCT VFR BLD AUTO: 37.3 % (ref 40–53)
HGB BLD-MCNC: 12.5 G/DL (ref 13.3–17.7)
IMM GRANULOCYTES # BLD: 0 10E3/UL
IMM GRANULOCYTES NFR BLD: 0 %
LYMPHOCYTES # BLD AUTO: 1.5 10E3/UL (ref 0.8–5.3)
LYMPHOCYTES NFR BLD AUTO: 20 %
MAGNESIUM SERPL-MCNC: 1.9 MG/DL (ref 1.7–2.3)
MCH RBC QN AUTO: 30 PG (ref 26.5–33)
MCHC RBC AUTO-ENTMCNC: 33.5 G/DL (ref 31.5–36.5)
MCV RBC AUTO: 89 FL (ref 78–100)
MONOCYTES # BLD AUTO: 0.5 10E3/UL (ref 0–1.3)
MONOCYTES NFR BLD AUTO: 7 %
NEUTROPHILS # BLD AUTO: 5.2 10E3/UL (ref 1.6–8.3)
NEUTROPHILS NFR BLD AUTO: 67 %
PHOSPHATE SERPL-MCNC: 3 MG/DL (ref 2.5–4.5)
PLATELET # BLD AUTO: 207 10E3/UL (ref 150–450)
POTASSIUM SERPL-SCNC: 4.9 MMOL/L (ref 3.4–5.3)
PROT SERPL-MCNC: 6.8 G/DL (ref 6.4–8.3)
RBC # BLD AUTO: 4.17 10E6/UL (ref 4.4–5.9)
SODIUM SERPL-SCNC: 138 MMOL/L (ref 135–145)
TME LAST DOSE: ABNORMAL H
TME LAST DOSE: ABNORMAL H
WBC # BLD AUTO: 7.7 10E3/UL (ref 4–11)
WBC # BLD AUTO: 7.7 10E3/UL (ref 4–11)

## 2025-05-05 PROCEDURE — 84100 ASSAY OF PHOSPHORUS: CPT

## 2025-05-05 PROCEDURE — 82248 BILIRUBIN DIRECT: CPT

## 2025-05-05 PROCEDURE — 36415 COLL VENOUS BLD VENIPUNCTURE: CPT

## 2025-05-05 PROCEDURE — G0480 DRUG TEST DEF 1-7 CLASSES: HCPCS | Mod: 90

## 2025-05-05 PROCEDURE — 80158 DRUG ASSAY CYCLOSPORINE: CPT

## 2025-05-05 PROCEDURE — 80053 COMPREHEN METABOLIC PANEL: CPT

## 2025-05-05 PROCEDURE — 83735 ASSAY OF MAGNESIUM: CPT

## 2025-05-05 PROCEDURE — 85025 COMPLETE CBC W/AUTO DIFF WBC: CPT

## 2025-05-05 NOTE — PROGRESS NOTES
Clinical Product Navigator RN reviewed chart; patient on payer product coverage.  Review results:   CPN Initial Information Gathering  Referral Source: Health Plan    Patient identified by their health plan for RN Clinical Product Navigator review.  Patient has had 1 ED visit, 2 hospital admissions in the past 12 months. 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 Care Team: [facility] - PCP, SOT, SOT care coordinator, hepatologist, Diabetes Education, Pain Management, Urology, Dermatology.  Engaged with MTM post-transplant.  No additional action by RN Clinical Product Navigator identified at this time.    Melissa Behl BSN, RN, PHN, [facility]  RN Clinical Product Navigator  [phone]

## 2025-05-05 NOTE — TELEPHONE ENCOUNTER
ISSUE:   Cyclosporine level 46 on 5/5, goal 50-80 dose 100 mg BID.    PLAN:   Call Patient and confirm this was an accurate 12-hour trough.   Verify Cyclosporine dose 100 mg BID.   No dose change at this time, recheck level in 1 week.     Outcome:  Called patient, wife answered, she agreed to plan of care

## 2025-05-06 ENCOUNTER — TELEPHONE (OUTPATIENT)
Dept: TRANSPLANT | Facility: CLINIC | Age: 55
End: 2025-05-06
Payer: COMMERCIAL

## 2025-05-06 DIAGNOSIS — N18.32 CHRONIC KIDNEY DISEASE, STAGE 3B (H): ICD-10-CM

## 2025-05-06 DIAGNOSIS — Z94.4 LIVER REPLACED BY TRANSPLANT (H): Chronic | ICD-10-CM

## 2025-05-06 RX ORDER — CYCLOSPORINE 25 MG/1
25 CAPSULE, LIQUID FILLED ORAL 2 TIMES DAILY
Qty: 60 CAPSULE | Refills: 11 | Status: SHIPPED | OUTPATIENT
Start: 2025-05-06 | End: 2025-05-07 | Stop reason: DRUGHIGH

## 2025-05-06 RX ORDER — CYCLOSPORINE 100 MG/1
100 CAPSULE, LIQUID FILLED ORAL EVERY 12 HOURS
Qty: 60 CAPSULE | Refills: 11 | Status: SHIPPED | OUTPATIENT
Start: 2025-05-06 | End: 2025-05-07

## 2025-05-06 NOTE — TELEPHONE ENCOUNTER
ISSUE:   Cyclosporine level 46 on 5/5, goal 50-80 per Dr. Nova, dose 100 mg BID.    PLAN:   Call Patient and confirm this was an accurate 12-hour trough.   Verify Cyclosporine dose.  Confirm no new medications or or missed doses.   Confirm no new illness / infection / diarrhea.   If accurate trough and accurate dose, increase Cyclosporine dose to 125 mg .      Repeat labs in 2 weeks.  Lore Mckeon RN     Called patient to discuss cyclosporine level and dose. No answer, VM left. My chart message sent.   Evelyn Clemens RN   Transplant Coordinator  [phone]    Response received via [employer].   OUTCOME:   Spoke with Other spouse Adina , they confirm accurate trough level and current dose 100 mg BID.   Other spouse Adina  confirmed dose change to 125 mg BID.  Other Spouse Adina  agreed to repeat labs in 2 weeks.   Orders sent to preferred pharmacy for dose change and lab for repeat labs.   Other spouse adina  voiced understanding of plan.      Evelyn Clemens RN   Transplant Coordinator  [phone]

## 2025-05-07 RX ORDER — CYCLOSPORINE 100 MG/1
100 CAPSULE, LIQUID FILLED ORAL EVERY 12 HOURS
Qty: 60 CAPSULE | Refills: 11 | Status: SHIPPED | OUTPATIENT
Start: 2025-05-07

## 2025-05-07 NOTE — TELEPHONE ENCOUNTER
Message from Dr. Nova:    No dose change. This is basically at his goal of 50, and we just reduced. His troughs are all over the placed (perhaps due to MJ).  Would just repeat another trough in 1-2 weeks to make sure not lower.

## 2025-05-12 ENCOUNTER — OFFICE VISIT (OUTPATIENT)
Dept: TRANSPLANT | Facility: CLINIC | Age: 55
End: 2025-05-12
Attending: TRANSPLANT SURGERY
Payer: COMMERCIAL

## 2025-05-12 VITALS
BODY MASS INDEX: 31.59 KG/M2 | OXYGEN SATURATION: 97 % | HEART RATE: 90 BPM | DIASTOLIC BLOOD PRESSURE: 86 MMHG | SYSTOLIC BLOOD PRESSURE: 138 MMHG | WEIGHT: 217 LBS

## 2025-05-12 DIAGNOSIS — Z94.4 STATUS POST LIVER TRANSPLANTATION (H): Primary | ICD-10-CM

## 2025-05-12 PROCEDURE — 3079F DIAST BP 80-89 MM HG: CPT | Performed by: TRANSPLANT SURGERY

## 2025-05-12 PROCEDURE — 3075F SYST BP GE 130 - 139MM HG: CPT | Performed by: TRANSPLANT SURGERY

## 2025-05-12 PROCEDURE — 99213 OFFICE O/P EST LOW 20 MIN: CPT | Performed by: TRANSPLANT SURGERY

## 2025-05-12 RX ORDER — ASPIRIN 81 MG/1
81 TABLET ORAL DAILY
Qty: 90 TABLET | Refills: 3 | Status: SHIPPED | OUTPATIENT
Start: 2025-05-12

## 2025-05-12 NOTE — LETTER
5/12/2025      Mary Anayarowan Lopez  [address]  [place] [address]      Dear Colleague,    Thank you for referring your patient, Mary Lopez, to the [facility]. Please see a copy of my visit note below.    Transplant Surgery -OUTPATIENT IMMUNOSUPPRESSION PROGRESS NOTE    Date of Visit: 05/18/2025    Transplants:  3/5/2024 (Liver); Postoperative day:  439  ASSESMENT AND PLAN:  1.Graft Function:Liver allograft: no rejection or technical problems.    2.Immunosuppression Management: Cyclosporine based immunosuppression  3.Hypertension: Okay  4.Renal Function: Satisfactory  5.Lab frequency: Monthly  6.Other:  Patient complains of pain on the right groin where he had a hernia repair.  At this time there is no recurrence of the hernia it is very likely related to the mesh that is possibly healing.  I told him to return back in 3 months if the pain persists at which point we may consider a nerve block.    Date: May 18, 2025    Transplant:  [x]                             Liver []                              Kidney []                             Pancreas []                              Other:             Chief Complaint:Post-op Visit (1 year annual)    History of Present Illness:  Patient Active Problem List   Diagnosis     Mild hyperlipidemia     Persistent insomnia     Alcohol use disorder in remission     Type 2 diabetes mellitus without complication, with long-term current use of insulin (H)     Liver replaced by transplant (H)     Immunosuppressed status     Hypomagnesemia     Ganglion cyst of wrist, right     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)     History of biliary duct stent placement     Chronic generalized pain disorder     Alpha-1-antitrypsin deficiency (H)     Chronic kidney disease, stage 3b (H)     Alcohol dependence, in remission (H)     SOCIAL /FAMILY HISTORY: [x]                  No recent change    Past Medical History:   Diagnosis Date      ANGEL (acute kidney injury) 6/27/2023     Alcoholic cirrhosis of liver without ascites (H) 07/13/2023     Alcoholic hepatitis with ascites (H) 10/03/2023     Alcoholic hepatitis without ascites (H) 07/13/2023     Chronic generalized pain disorder 12/25/2024     CKD stage 3a, GFR 45-59 ml/min (H) 08/08/2024     Closed fracture of one rib of left side 09/14/2023     Concussion without loss of consciousness 03/11/2020     Decompensated hepatic cirrhosis (H) 09/15/2023     Diabetes mellitus, type 2 (H) 11/22/2023     Essential hypertension 03/11/2020     Ganglion cyst of wrist, right 04/18/2024     History of biliary duct stent placement 09/05/2024     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)      Liver replaced by transplant (H) 03/05/2024     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    ACR PRAJAPATI 6-7/9, treated with steroids     Mild hyperlipidemia 12/07/2021     Persistent insomnia 07/13/2023     Portal hypertension (H) 07/13/2023     Right inguinal hernia 08/08/2024     Scrotal abscess 6/27/2023     Secondary esophageal 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 (H) 07/13/2023     Tobacco abuse disorder 03/11/2020     Type 2 diabetes mellitus with hyperglycemia (H) 12/22/2023     Past Surgical History:   Procedure Laterality Date     BENCH LIVER  3/5/2024    Procedure: Bench liver;  Surgeon: Ryder Cortez MD;  Location: [facility]     CHOLECYSTECTOMY       COLONOSCOPY N/A 1/2/2024    Procedure: COLONOSCOPY, WITH POLYPECTOMY;  Surgeon: Jka Urbina MD;  Location: [facility]     CV RIGHT HEART CATH MEASUREMENTS RECORDED N/A 1/30/2024    Procedure: Right Heart Catheterization;  Surgeon: Alfred Tafoya MD;  Location:  HEART CARDIAC CATH LAB     ENDOSCOPIC RETROGRADE CHOLANGIOPANCREATOGRAM N/A 10/22/2024    Procedure: ENDOSCOPIC RETROGRADE CHOLANGIOPANCREATOGRAPHY, WITH bile duct stent stent exchange and balloon dilation;  Surgeon: Naldo Rodriguez MD;  Location: [facility]     ENDOSCOPIC RETROGRADE  CHOLANGIOPANCREATOGRAM N/A 2024    Procedure: ENDOSCOPIC RETROGRADE CHOLANGIOPANCREATOGRAPHY BILLIARY DILATION AND STENT PLACEMENT;  Surgeon: Will Martínez MD;  Location: [facility] OR     ENDOSCOPIC RETROGRADE CHOLANGIOPANCREATOGRAPHY, EXCHANGE TUBE/STENT N/A 2024    Procedure: Endoscopic Retrograde Cholangiopancreatography with biliary sphincterotomy, balloon dilation, pancreatic stent placement,biliary stent exchange;  Surgeon: Naldo Rodriguez MD;  Location: [facility]     ENDOSCOPIC RETROGRADE CHOLANGIOPANCREATOGRAPHY, EXCHANGE TUBE/STENT N/A 2025    Procedure: ENDOSCOPIC RETROGRADE CHOLANGIOPANCREATOGRAPHY, WITH sludge removal, and stent removal;  Surgeon: Will Martínez MD;  Location[facility]     ENTEROSCOPY SMALL BOWEL N/A 2024    Procedure: Enteroscopy small bowel;  Surgeon: Hugo Daigle MD;  Location: [facility]     ESOPHAGOSCOPY, GASTROSCOPY, DUODENOSCOPY (EGD), COMBINED N/A 2024    Procedure: Esophagoscopy, gastroscopy, duodenoscopy (EGD), combined;  Surgeon: Hugo Daigle MD;  Location: [facility]     HERNIORRHAPHY INGUINAL Right 2024    Procedure: Open Inguinal Hernia Repair;  Surgeon: Ryder Cortez MD;  Location[facility]     IR LIVER BIOPSY PERCUTANEOUS  3/15/2024     IR RETROPERITONEAL ABSCESS DRAINAGE  2024     TONSILLECTOMY       TRANSPLANT LIVER RECIPIENT  DONOR N/A 3/5/2024    Procedure: Transplant liver recipient  donor, bile duct stent placement;  Surgeon: Ryder Cortez MD;  Location: [facility]     VASECTOMY       Social History     Socioeconomic History     Marital status:      Spouse name: Not on file     Number of children: Not on file     Years of education: Not on file     Highest education level: Not on file   Occupational History     Occupation: Not working now   Tobacco Use     Smoking status: Former     Types: Cigarettes     Passive exposure: Never     Smokeless tobacco: Current     Types: Chew     Last attempt  to quit: 1/1/2004     Tobacco comments:     Chew daily 1/8 of a tin per day   Vaping Use     Vaping status: Never Used   Substance and Sexual Activity     Alcohol use: Not Currently     Alcohol/week: 12.0 standard drinks of alcohol     Types: 12 Standard drinks or equivalent per week     Comment: Sober since 6/25/2023     Drug use: Not Currently     Sexual activity: Yes     Partners: Female     Birth control/protection: Male Surgical   Other Topics Concern     Parent/sibling w/ CABG, MI or angioplasty before 65F 55M? No   Social History Narrative     Not on file     Social Drivers of Health     Financial Resource Strain: Low Risk  (12/22/2023)    Financial Resource Strain      Within the past 12 months, have you or your family members you live with been unable to get utilities (heat, electricity) when it was really needed?: No   Food Insecurity: No Food Insecurity (6/22/2024)    Received from [facility]    Hunger Vital Sign      Worried About Running Out of Food in the Last Year: Never true      Ran Out of Food in the Last Year: Never true   Transportation Needs: No Transportation Needs (6/22/2024)    [facility]    PRAPARE - Transportation      Lack of Transportation (Medical): No      Lack of Transportation (Non-Medical): No   Physical Activity: Not on file   Stress: Not on file   Social Connections: Not on file   Interpersonal Safety: Low Risk  (2/13/2025)    Interpersonal Safety      Do you feel physically and emotionally safe where you currently live?: Yes      Within the past 12 months, have you been hit, slapped, kicked or otherwise physically hurt by someone?: No      Within the past 12 months, have you been humiliated or emotionally abused in other ways by your partner or ex-partner?: No   Housing Stability: Low Risk  (6/22/2024)    Received from [facility]    Housing Stability Vital Sign      Unable to Pay for Housing in the Last Year: No      Number of Times Moved  in the Last Year: 0      Homeless in the Last Year: No     Prescription Medications as of 5/18/2025         Rx Number Disp Refills Start End Last Dispensed Date Next Fill Date Owning Pharmacy    ACE/ARB/ARNI NOT PRESCRIBED (INTENTIONAL)  -- --  --       Sig: Please choose reason not prescribed from choices below.    Class: Historical    aspirin 81 MG EC tablet  90 tablet 3 5/12/2025 --   [facility] #[id number] - [place], [place] - [address] [facility]    Sig: Take 1 tablet (81 mg) by mouth daily.    Class: E-Prescribe    Route: Oral    blood glucose (NO BRAND SPECIFIED) test strip  100 strip 6 10/24/2023 --   [facility] #[id number] - [place], [place] - [address]    Sig: Use to test blood sugar two times daily or as directed. To accompany: Blood Glucose Monitor Brands: per insurance.    Class: E-Prescribe    blood glucose monitoring (NO BRAND SPECIFIED) meter device kit  1 kit 0 10/24/2023 --   [facility] #[id number] - [place], [place] - [address] [facility]    Sig: Use to test blood sugar twice times daily or as directed. Preferred blood glucose meter OR supplies to accompany: Blood Glucose Monitor Brands: per insurance.    Class: E-Prescribe    Continuous Glucose Sensor (DEXCOM G7 SENSOR) MISC  9 each 3 3/8/2025 --   [facility] #[id number] - [place], [place] - [address] [facility]    Sig: Change every 10 days.    Class: E-Prescribe    cycloSPORINE modified (GENERIC EQUIVALENT) 100 MG capsule  60 capsule 11 5/7/2025 --   [facility] - [place], [place] - [address]    Sig: Take 1 capsule (100 mg) by mouth every 12 hours.    Class: E-Prescribe    Route: Oral    diclofenac (VOLTAREN) 1 % topical gel  100 g 1 12/9/2024 --   [facility] #[id number] - [place], [place] - [address] [facility]    Sig: Apply 4 grams to knees four times daily using enclosed dosing card.     Class: E-Prescribe    empagliflozin (JARDIANCE) 25 MG TABS tablet  90 tablet 1 2024 --   [id number] - [place], [place] - [address] [facility]    Sig: Take 1 tablet (25 mg) by mouth daily.    Class: E-Prescribe    Route: Oral    Glucagon (GVOKE HYPOPEN) 1 MG/0.2ML pen  0.4 mL 1 3/21/2024 --   [facility] [place], [place] - [address] [facility]    Sig: Inject the contents of 1 device under the skin into lower abdomen, outer thigh, or outer upper arm as needed for hypoglycemia. If no response after 15 minutes, additional 1 mg dose from a new device may be injected while waiting for emergency assistance.    Class: E-Prescribe    Notes to Pharmacy: Replaces [id number] per insurance formulary    Injection Device for insulin (CEQUR SIMPLICITY 2U) KHUSHBOO  10 each 5 2024 --   [facility] #[id number] - [place], [place] - [address] [facility] [address]    Si each every 3 days    Class: E-Prescribe    Route: Does not apply    insulin degludec (TRESIBA FLEXTOUCH) 100 UNIT/ML pen  15 mL 5 2025 --   [facility] [address], [address]    Sig: Inject 16 units subcutaneous each am. Please do NOT fill today ( 2024 ).    Class: E-Prescribe    Insulin Lispro-aabc, 1 U Dial, (LYUMJEV KWIKPEN) 100 UNIT/ML SOPN  30 mL 2 2025 --   [facility] #[id number] - [place], [place] - [address] [facility]    Sig: Inject 6 Units subcutaneously 3 times daily (with meals). 6 units with meals, plus correction. Pt may use up to 30 units daily. This REPLACES Humalog/Novolog insulin.    Class: E-Prescribe    Route: Subcutaneous    insulin pen needle (29G X 12.7MM) 29G X 12.7MM miscellaneous  400 each 3 3/6/2025 --   [facility] #[id number] - [place], [place] - [address] [facility] [address]    Sig: For insulin administration 4x daily.    Class: E-Prescribe    insulin pen needle (32G X 4 MM) 32G X 4 MM  miscellaneous  200 each 1 3/21/2024 --   [facility] - [place], [place] - [address] [facility]    Sig: Use as directed by provider Per insurance coverage    Class: E-Prescribe    insulin pen needle (BD PEN NEEDLE SHERRIE 2ND GEN) 32G X 4 MM miscellaneous  100 each 3 2/10/2025 --   [facility] #[id number] - [place], [place] - [address] [facility]    Sig: USES 5 PER DAY    Class: E-Prescribe    methocarbamol (ROBAXIN) 750 MG tablet  15 tablet 0 11/23/2024 --   [facility][id number] - [place], [place] - [address] [facility]    Sig: Take 1 tablet (750 mg) by mouth 3 times daily as needed for muscle spasms.    Class: E-Prescribe    Route: Oral    multivitamin w/minerals (THERA-VIT-M) tablet  90 tablet 3 3/8/2025 --   [id number] - [place] - [address] [facility]    Sig: Take 1 tablet by mouth daily.    Class: E-Prescribe    Route: Oral    mycophenolic acid (GENERIC EQUIVALENT) 360 MG EC tablet  60 tablet 5 4/1/2025 --   [id number] - [place], [place] [address] [facility]    Sig: Take 1 tablet (360 mg) by mouth 2 times daily.    Class: E-Prescribe    Route: Oral    omeprazole (PRILOSEC) 20 MG DR capsule  180 capsule 1 8/3/2023 --       Sig: Take 20 mg by mouth daily    Class: Historical    Route: Oral    pregabalin (LYRICA) 75 MG capsule  60 capsule 0 5/13/2025 --   [facility] #[id number] - [place], [place] - [address] [facility]    Sig: Take 1 capsule (75 mg) by mouth 2 times daily.    Class: E-Prescribe    Route: Oral    QUEtiapine (SEROQUEL) 25 MG tablet  -- --  --       Sig: Take 25 mg by mouth at bedtime.    Class: Historical    Route: Oral    sodium zirconium cyclosilicate (LOKELMA) 10 g PACK packet  30 each 0 1/6/2025 --   [facility] #[id number] - [place], [place] - [address] AT [facility]    Sig: Take 1 packet (10 g) by mouth daily as  needed (Hyperkalemia).    Class: E-Prescribe    Route: Oral    tamsulosin (FLOMAX) 0.4 MG capsule  180 capsule 3 4/8/2025 --   [facility] - [place] [address]     Sig: Take 2 capsules (0.8 mg) by mouth daily.    Class: E-Prescribe    Route: Oral    tamsulosin (FLOMAX) 0.4 MG capsule  90 capsule 3 4/2/2025 --   [facility] [address] [facility]    Sig: Take 1 capsule (0.4 mg) by mouth daily.    Class: E-Prescribe    Route: Oral    thin (NO BRAND SPECIFIED) lancets  100 each 6 10/24/2023 --   [facility] #[id number] - [place], [place] - [facility] [facility] AT [facility]    Sig: Use with lanceting device. To accompany: Blood Glucose Monitor Brands: per insurance.    Class: E-Prescribe          Clinic-Administered Medications as of 5/18/2025         Dose Frequency Start End    sodium chloride (PF) 0.9% PF flush 3 mL 3 mL EVERY 1 MIN PRN 2/17/2025 --    Route: Intravenous          Other food allergy and Pineapple   REVIEW OF SYSTEMS (check box if normal)  [x]               GENERAL  [x]                 PULMONARY [x]                GENITOURINARY  [x]                CNS                 [x]                 CARDIAC  [x]                 ENDOCRINE  [x]                EARS,NOSE,THROAT [x]                 GASTROINTESTINAL [x]                 NEUROLOGIC    [x]                MUSCLOSKELTAL  [x]                  HEMATOLOGY      PHYSICAL EXAM (check box if normal)/86   Pulse 90   Wt 98.4 kg (217 lb)   SpO2 97%   BMI 31.59 kg/m          [x]            GENERAL:    [x]       EYES:  ICTERIC   []        YES  []                    NO  [x]           EXTREMITIES: Clubbing []       Y     [x]           N    [x]           EARS, NOSE, THROAT: Membranes Moist    YES   [x]                   NO []                  [x]           LUNGS:  CLEAR    YES       [x]                  NO    []                                [x]           SKIN: Jaundice           YES        []                  NO    [x]                   Rash: YES       []                  NO    [x]                                     [x]             HEART: Regular Rate          YES       [x]                  NO    []                   Incision Clean:  YES       [x]                  NO    []                                [x]                    ABDOMEN: Right groin hernia has healed well transplant incision is healing well organomegaly YES       []                  NO    [x]                       [x]                    NEUROLOGICAL:  Nonfocal  YES       [x]                  NO    []                       [x]                    Hernia YES       []                  NO    [x]                   PSYCHIATRIC:  Appropriate  YES       [x]                  NO    []                       OTHER:                                                                                                   PAIN SCALE:: 3     Again, thank you for allowing me to participate in the care of your patient.        Sincerely,        Ryder Cortez MD    Electronically signed

## 2025-05-13 DIAGNOSIS — R10.84 ABDOMINAL PAIN, GENERALIZED: ICD-10-CM

## 2025-05-13 DIAGNOSIS — G89.28 OTHER CHRONIC POSTPROCEDURAL PAIN: ICD-10-CM

## 2025-05-13 RX ORDER — PREGABALIN 75 MG/1
75 CAPSULE ORAL 2 TIMES DAILY
Qty: 60 CAPSULE | Refills: 0 | Status: SHIPPED | OUTPATIENT
Start: 2025-05-13

## 2025-05-13 NOTE — TELEPHONE ENCOUNTER
5/13/2025 12:09 PM     REFILL REQUEST:     This is a patient of mine. PDMP and Chart have been reviewed; refill request is appropriate.    Refilled for 30 day supply.  Script e-Prescribed to pharmacy    LESA Mendez, JOHN, FNP-C

## 2025-05-13 NOTE — TELEPHONE ENCOUNTER
Received fax from pharmacy requesting refill(s) for pregabalin (LYRICA) 75 MG capsule     Last refilled on 04/14/25    Patient last seen on 03/28/25  Next appt scheduled for 06/06/25    E[facility]prescribe to:     [id number] - [place], [place] - [address] [facility] AT [facility]     Will facilitate refill.      Haylee Kong MA  [facility]

## 2025-05-18 ENCOUNTER — LAB (OUTPATIENT)
Dept: LAB | Facility: CLINIC | Age: 55
End: 2025-05-18
Payer: COMMERCIAL

## 2025-05-18 DIAGNOSIS — Z94.4 LIVER REPLACED BY TRANSPLANT (H): Chronic | ICD-10-CM

## 2025-05-18 DIAGNOSIS — N18.32 CHRONIC KIDNEY DISEASE, STAGE 3B (H): Primary | ICD-10-CM

## 2025-05-18 LAB
CREAT UR-MCNC: 64.1 MG/DL
MICROALBUMIN UR-MCNC: <12 MG/L
MICROALBUMIN/CREAT UR: NORMAL MG/G{CREAT}

## 2025-05-18 PROCEDURE — 82043 UR ALBUMIN QUANTITATIVE: CPT

## 2025-05-18 PROCEDURE — 82570 ASSAY OF URINE CREATININE: CPT

## 2025-05-18 PROCEDURE — 80158 DRUG ASSAY CYCLOSPORINE: CPT

## 2025-05-18 PROCEDURE — 36415 COLL VENOUS BLD VENIPUNCTURE: CPT

## 2025-05-18 NOTE — PROGRESS NOTES
Transplant Surgery -OUTPATIENT IMMUNOSUPPRESSION PROGRESS NOTE    Date of Visit: 5/12/2025    Transplants:  3/5/2024 (Liver); Postoperative day:  439  ASSESMENT AND PLAN:  1.Graft Function:Liver allograft: no rejection or technical problems.    2.Immunosuppression Management: Cyclosporine based immunosuppression  3.Hypertension: Okay  4.Renal Function: Satisfactory  5.Lab frequency: Monthly  6.Other:  Patient complains of pain on the right groin where he had a hernia repair.  At this time there is no recurrence of the hernia it is very likely related to the mesh that is possibly healing.  I told him to return back in 3 months if the pain persists at which point we may consider a nerve block.    Date: May 18, 2025    Transplant:  [x]                             Liver []                              Kidney []                             Pancreas []                              Other:             Chief Complaint:Post-op Visit (1 year annual)    History of Present Illness:  Patient Active Problem List   Diagnosis    Mild hyperlipidemia    Persistent insomnia    Alcohol use disorder in remission    Type 2 diabetes mellitus without complication, with long-term current use of insulin (H)    Liver replaced by transplant (H)    Immunosuppressed status    Hypomagnesemia    Ganglion cyst of wrist, right    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)    History of biliary duct stent placement    Chronic generalized pain disorder    Alpha-1-antitrypsin deficiency (H)    Chronic kidney disease, stage 3b (H)    Alcohol dependence, in remission (H)     SOCIAL /FAMILY HISTORY: [x]                  No recent change    Past Medical History:   Diagnosis Date    ANGEL (acute kidney injury) 6/27/2023    Alcoholic cirrhosis of liver without ascites (H) 07/13/2023    Alcoholic hepatitis with ascites (H) 10/03/2023    Alcoholic hepatitis without ascites (H) 07/13/2023    Chronic generalized pain disorder 12/25/2024    CKD stage  3a, GFR 45-59 ml/min (H) 08/08/2024    Closed fracture of one rib of left side 09/14/2023    Concussion without loss of consciousness 03/11/2020    Decompensated hepatic cirrhosis (H) 09/15/2023    Diabetes mellitus, type 2 (H) 11/22/2023    Essential hypertension 03/11/2020    Ganglion cyst of wrist, right 04/18/2024    History of biliary duct stent placement 09/05/2024    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)     Liver replaced by transplant (H) 03/05/2024    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    ACR PRAJAPATI 6-7/9, treated with steroids    Mild hyperlipidemia 12/07/2021    Persistent insomnia 07/13/2023    Portal hypertension (H) 07/13/2023    Right inguinal hernia 08/08/2024    Scrotal abscess 6/27/2023    Secondary esophageal 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 (H) 07/13/2023    Tobacco abuse disorder 03/11/2020    Type 2 diabetes mellitus with hyperglycemia (H) 12/22/2023     Past Surgical History:   Procedure Laterality Date    BENCH LIVER  3/5/2024    Procedure: Bench liver;  Surgeon: Ryder Cortez MD;  Location: [facility]    CHOLECYSTECTOMY      COLONOSCOPY N/A 1/2/2024    Procedure: COLONOSCOPY, WITH POLYPECTOMY;  Surgeon: Jak Urbina MD;  Location: [facility]    CV RIGHT HEART CATH MEASUREMENTS RECORDED N/A 1/30/2024    Procedure: Right Heart Catheterization;  Surgeon: Alfred Tafoya MD;  Location:  HEART CARDIAC CATH LAB    ENDOSCOPIC RETROGRADE CHOLANGIOPANCREATOGRAM N/A 10/22/2024    Procedure: ENDOSCOPIC RETROGRADE CHOLANGIOPANCREATOGRAPHY, WITH bile duct stent stent exchange and balloon dilation;  Surgeon: Naldo Rodriguez MD;  [facility]    ENDOSCOPIC RETROGRADE CHOLANGIOPANCREATOGRAM N/A 12/17/2024    Procedure: ENDOSCOPIC RETROGRADE CHOLANGIOPANCREATOGRAPHY BILLIARY DILATION AND STENT PLACEMENT;  Surgeon: Will Martínez MD;  Location:  OR    ENDOSCOPIC RETROGRADE CHOLANGIOPANCREATOGRAPHY, EXCHANGE TUBE/STENT N/A 8/13/2024    Procedure: Endoscopic Retrograde  Cholangiopancreatography with biliary sphincterotomy, balloon dilation, pancreatic stent placement,biliary stent exchange;  Surgeon: Naldo Rodriguez MD;  [facility]    ENDOSCOPIC RETROGRADE CHOLANGIOPANCREATOGRAPHY, EXCHANGE TUBE/STENT N/A 2025    Procedure: ENDOSCOPIC RETROGRADE CHOLANGIOPANCREATOGRAPHY, WITH sludge removal, and stent removal;  Surgeon: Will Martínez MD;  Location[facility]    ENTEROSCOPY SMALL BOWEL N/A 2024    Procedure: Enteroscopy small bowel;  Surgeon: Hugo Daigle MD;  Location: [facility]    ESOPHAGOSCOPY, GASTROSCOPY, DUODENOSCOPY (EGD), COMBINED N/A 2024    Procedure: Esophagoscopy, gastroscopy, duodenoscopy (EGD), combined;  Surgeon: Hugo Daigle MD;  Location: [facility]    HERNIORRHAPHY INGUINAL Right 2024    Procedure: Open Inguinal Hernia Repair;  Surgeon: Ryder Cortez MD;  [facility]    IR LIVER BIOPSY PERCUTANEOUS  3/15/2024    IR RETROPERITONEAL ABSCESS DRAINAGE  2024    TONSILLECTOMY      TRANSPLANT LIVER RECIPIENT  DONOR N/A 3/5/2024    Procedure: Transplant liver recipient  donor, bile duct stent placement;  Surgeon: Ryder Cortez MD;  Location: [facility]    VASECTOMY       Social History     Socioeconomic History    Marital status:      Spouse name: Not on file    Number of children: Not on file    Years of education: Not on file    Highest education level: Not on file   Occupational History    Occupation: Not working now   Tobacco Use    Smoking status: Former     Types: Cigarettes     Passive exposure: Never    Smokeless tobacco: Current     Types: Chew     Last attempt to quit: 2004    Tobacco comments:     Chew daily 1/8 of a tin per day   Vaping Use    Vaping status: Never Used   Substance and Sexual Activity    Alcohol use: Not Currently     Alcohol/week: 12.0 standard drinks of alcohol     Types: 12 Standard drinks or equivalent per week     Comment: Sober since 2023    Drug  use: Not Currently    Sexual activity: Yes     Partners: Female     Birth control/protection: Male Surgical   Other Topics Concern    Parent/sibling w/ CABG, MI or angioplasty before 65F 55M? No   Social History Narrative    Not on file     Social Drivers of Health     Financial Resource Strain: Low Risk  (12/22/2023)    Financial Resource Strain     Within the past 12 months, have you or your family members you live with been unable to get utilities (heat, electricity) when it was really needed?: No   Food Insecurity: No Food Insecurity (6/22/2024)    Received from [email or web address]    Hunger Vital Sign     Worried About Running Out of Food in the Last Year: Never true     Ran Out of Food in the Last Year: Never true   Transportation Needs: No Transportation Needs (6/22/2024)    Received from [email or web address]    PRAPARE - Transportation     Lack of Transportation (Medical): No     Lack of Transportation (Non-Medical): No   Physical Activity: Not on file   Stress: Not on file   Social Connections: Not on file   Interpersonal Safety: Low Risk  (2/13/2025)    Interpersonal Safety     Do you feel physically and emotionally safe where you currently live?: Yes     Within the past 12 months, have you been hit, slapped, kicked or otherwise physically hurt by someone?: No     Within the past 12 months, have you been humiliated or emotionally abused in other ways by your partner or ex-partner?: No   Housing Stability: Low Risk  (6/22/2024)    Received from [email or web address]    Housing Stability Vital Sign     Unable to Pay for Housing in the Last Year: No     Number of Times Moved in the Last Year: 0     Homeless in the Last Year: No     Prescription Medications as of 5/18/2025         Rx Number Disp Refills Start End Last Dispensed Date Next Fill Date Owning Pharmacy    ACE/ARB/ARNI NOT PRESCRIBED (INTENTIONAL)  -- --  --       Sig: Please choose reason not prescribed from choices below.    Class: Historical     aspirin 81 MG EC tablet  90 tablet 3 5/12/2025 --   [id number] - [place], [place] - [address] [facility] [address]    Sig: Take 1 tablet (81 mg) by mouth daily.    Class: E-Prescribe    Route: Oral    blood glucose (NO BRAND SPECIFIED) test strip  100 strip 6 10/24/2023 --   [id number] - [place], [place] - [address] [facility] [address]    Sig: Use to test blood sugar two times daily or as directed. To accompany: Blood Glucose Monitor Brands: per insurance.    Class: E-Prescribe    blood glucose monitoring (NO BRAND SPECIFIED) meter device kit  1 kit 0 10/24/2023 --   [id number] - [place], [address]    Sig: Use to test blood sugar twice times daily or as directed. Preferred blood glucose meter OR supplies to accompany: Blood Glucose Monitor Brands: per insurance.    Class: E-Prescribe    Continuous Glucose Sensor (DEXCOM G7 SENSOR) MISC  9 each 3 3/8/2025 --   [id number] - [place], [place] [id number] [facility]    Sig: Change every 10 days.    Class: E-Prescribe    cycloSPORINE modified (GENERIC EQUIVALENT) 100 MG capsule  60 capsule 11 5/7/2025 --   [facility] -  - [address] [place] [address] Ave     Sig: Take 1 capsule (100 mg) by mouth every 12 hours.    Class: E-Prescribe    Route: Oral    diclofenac (VOLTAREN) 1 % topical gel  100 g 1 12/9/2024 --   [id number] - [place], [place] - [address]    Sig: Apply 4 grams to knees four times daily using enclosed dosing card.    Class: E-Prescribe    empagliflozin (JARDIANCE) 25 MG TABS tablet  90 tablet 1 12/6/2024 --   [id number] - [place], [place] - [address]    Sig: Take 1 tablet (25 mg) by mouth daily.    Class: E-Prescribe    Route: Oral    Glucagon (GVOKE HYPOPEN) 1 MG/0.2ML pen  0.4 mL 1 3/21/2024 --   [facility] [address]    Sig: Inject the contents of 1 device under the skin into lower abdomen, outer thigh, or outer upper arm as needed for hypoglycemia. If no response after 15 minutes, additional 1 mg dose from a new device may be injected while waiting for emergency assistance.    Class: E-Prescribe    Notes to Pharmacy: Replaces [id number] per insurance formulary    Injection Device for insulin (CEQUR SIMPLICITY 2U) KHUSHBOO  10 each 5 2024 --   [facility] #[id number] - [place], [place] - [address] [facility]    Si each every 3 days    Class: E-Prescribe    Route: Does not apply    insulin degludec (TRESIBA FLEXTOUCH) 100 UNIT/ML pen  15 mL 5 2025 --   [place], [place] - [address] [facility]    Sig: Inject 16 units subcutaneous each am. Please do NOT fill today ( 2024 ).    Class: E-Prescribe    Insulin Lispro-aabc, 1 U Dial, (MCKAY AREVALOPEN) 100 UNIT/ML SOPN  30 mL 2 2025 --   [facility] #[id number] - [place], [place] - [address] [facility]    Sig: Inject 6 Units subcutaneously 3 times daily (with meals). 6 units with meals, plus correction. Pt may use up to 30 units daily. This REPLACES Humalog/Novolog insulin.    Class: E-Prescribe    Route: Subcutaneous    insulin pen needle (29G X 12.7MM) 29G X 12.7MM miscellaneous  400 each 3 3/6/2025 --   [facility] #[id number] - [place], [place] - [address] [facility]    Sig: For insulin administration 4x daily.    Class: E-Prescribe    insulin pen needle (32G X 4 MM) 32G X 4 MM miscellaneous  200 each 1 3/21/2024 --   [address]    Sig: Use as directed by provider Per insurance coverage    Class: E-Prescribe    insulin pen needle (BD PEN NEEDLE SHERRIE 2ND GEN) 32G X 4 MM miscellaneous  100 each 3 2/10/2025 --   [id number] - [place] -  [address] [facility]    Sig: USES 5 PER DAY    Class: E-Prescribe    methocarbamol (ROBAXIN) 750 MG tablet  15 tablet 0 11/23/2024 --   [facility][id number] - [place], [address]    Sig: Take 1 tablet (750 mg) by mouth 3 times daily as needed for muscle spasms.    Class: E-Prescribe    Route: Oral    multivitamin w/minerals (THERA-VIT-M) tablet  90 tablet 3 3/8/2025 --   [facility] #[id number] - [place], [address]    Sig: Take 1 tablet by mouth daily.    Class: E-Prescribe    Route: Oral    mycophenolic acid (GENERIC EQUIVALENT) 360 MG EC tablet  60 tablet 5 4/1/2025 --   [facility] #[id number] - [place], [address]    Sig: Take 1 tablet (360 mg) by mouth 2 times daily.    Class: E-Prescribe    Route: Oral    omeprazole (PRILOSEC) 20 MG DR capsule  180 capsule 1 8/3/2023 --       Sig: Take 20 mg by mouth daily    Class: Historical    Route: Oral    pregabalin (LYRICA) 75 MG capsule  60 capsule 0 5/13/2025 --   [facility] #[id number] - [place], [address]    Sig: Take 1 capsule (75 mg) by mouth 2 times daily.    Class: E-Prescribe    Route: Oral    QUEtiapine (SEROQUEL) 25 MG tablet  -- --  --       Sig: Take 25 mg by mouth at bedtime.    Class: Historical    Route: Oral    sodium zirconium cyclosilicate (LOKELMA) 10 g PACK packet  30 each 0 1/6/2025 --   [id number] - [place], [address]    Sig: Take 1 packet (10 g) by mouth daily as needed (Hyperkalemia).    Class: E-Prescribe    Route: Oral    tamsulosin (FLOMAX) 0.4 MG capsule  180 capsule 3 4/8/2025 --   [facility] - [place] [address]    Sig: Take 2 capsules (0.8 mg) by mouth daily.    Class: E-Prescribe    Route: Oral    tamsulosin (FLOMAX) 0.4 MG capsule  90 capsule 3  4/2/2025 --   [facility] - [place], [place] - [address]    Sig: Take 1 capsule (0.4 mg) by mouth daily.    Class: E-Prescribe    Route: Oral    thin (NO BRAND SPECIFIED) lancets  100 each 6 10/24/2023 --   [facility] #[id number] - [place], [place] - [address] [facility] [address] [facility] [address] [facility]    Sig: Use with lanceting device. To accompany: Blood Glucose Monitor Brands: per insurance.    Class: E-Prescribe          Clinic-Administered Medications as of 5/18/2025         Dose Frequency Start End    sodium chloride (PF) 0.9% PF flush 3 mL 3 mL EVERY 1 MIN PRN 2/17/2025 --    Route: Intravenous          Other food allergy and Pineapple   REVIEW OF SYSTEMS (check box if normal)  [x]               GENERAL  [x]                 PULMONARY [x]                GENITOURINARY  [x]                CNS                 [x]                 CARDIAC  [x]                 ENDOCRINE  [x]                EARS,NOSE,THROAT [x]                 GASTROINTESTINAL [x]                 NEUROLOGIC    [x]                MUSCLOSKELTAL  [x]                  HEMATOLOGY      PHYSICAL EXAM (check box if normal)/86   Pulse 90   Wt 98.4 kg (217 lb)   SpO2 97%   BMI 31.59 kg/m          [x]            GENERAL:    [x]       EYES:  ICTERIC   []        YES  []                    NO  [x]           EXTREMITIES: Clubbing []       Y     [x]           N    [x]           EARS, NOSE, THROAT: Membranes Moist    YES   [x]                   NO []                  [x]           LUNGS:  CLEAR    YES       [x]                  NO    []                                [x]           SKIN: Jaundice           YES       []                  NO    [x]                   Rash: YES       []                  NO    [x]                                     [x]             HEART: Regular Rate          YES       [x]                  NO    []                   Incision Clean:  YES       [x]                  NO    []                                [x]                     ABDOMEN: Right groin hernia has healed well transplant incision is healing well organomegaly YES       []                  NO    [x]                       [x]                    NEUROLOGICAL:  Nonfocal  YES       [x]                  NO    []                       [x]                    Hernia YES       []                  NO    [x]                   PSYCHIATRIC:  Appropriate  YES       [x]                  NO    []                       OTHER:                                                                                                   PAIN SCALE:: 3 HPI      ROS      Physical Exam

## 2025-05-19 ENCOUNTER — TELEPHONE (OUTPATIENT)
Dept: TRANSPLANT | Facility: CLINIC | Age: 55
End: 2025-05-19
Payer: COMMERCIAL

## 2025-05-19 ENCOUNTER — RESULTS FOLLOW-UP (OUTPATIENT)
Dept: TRANSPLANT | Facility: CLINIC | Age: 55
End: 2025-05-19

## 2025-05-19 DIAGNOSIS — Z94.4 LIVER REPLACED BY TRANSPLANT (H): Primary | Chronic | ICD-10-CM

## 2025-05-19 DIAGNOSIS — N18.32 CHRONIC KIDNEY DISEASE, STAGE 3B (H): ICD-10-CM

## 2025-05-19 LAB
CYCLOSPORINE BLD LC/MS/MS-MCNC: 31 UG/L (ref 50–400)
TME LAST DOSE: ABNORMAL H
TME LAST DOSE: ABNORMAL H

## 2025-05-19 RX ORDER — CYCLOSPORINE 100 MG/1
100 CAPSULE, LIQUID FILLED ORAL EVERY 12 HOURS
COMMUNITY
Start: 2025-05-19

## 2025-05-19 RX ORDER — CYCLOSPORINE 25 MG/1
25 CAPSULE, LIQUID FILLED ORAL EVERY EVENING
Qty: 30 CAPSULE | Refills: 11 | Status: SHIPPED | OUTPATIENT
Start: 2025-05-19

## 2025-05-19 NOTE — TELEPHONE ENCOUNTER
Issue:   5/18: Cyclosporine level 31, goal 50-80    Plan:  Ora Nova MD Ewen, Karyn H, RN  I think the last one was borderline too.  Let's actually increase to 100mg AM/ 125mg PM  His levels have been all over the place  Repeat trough after dose increase please and make sure 12 hour    Outcome:   Verified current dose 100mg BID   No missed doses, new medication, or nausea/ diarrhea   Increase dose to 100mg AM, 125mg PM  Wife Rosio agreed to plan of care, next lab check 5/31

## 2025-05-20 ENCOUNTER — VIRTUAL VISIT (OUTPATIENT)
Dept: FAMILY MEDICINE | Facility: CLINIC | Age: 55
End: 2025-05-20
Payer: COMMERCIAL

## 2025-05-20 DIAGNOSIS — R39.198 DIFFICULTY VOIDING: ICD-10-CM

## 2025-05-20 DIAGNOSIS — G89.29 CHRONIC GROIN PAIN, RIGHT: Primary | ICD-10-CM

## 2025-05-20 DIAGNOSIS — R10.31 CHRONIC GROIN PAIN, RIGHT: Primary | ICD-10-CM

## 2025-05-20 PROCEDURE — 98005 SYNCH AUDIO-VIDEO EST LOW 20: CPT | Performed by: INTERNAL MEDICINE

## 2025-05-20 NOTE — PROGRESS NOTES
"  If patient has telephone visit, have they been educated on video visit as preferred visit method and offered to change to video visit? NOT APPLICABLE        Instructions Relayed to Patient by Virtual Roomer:     Patient is active on [employer]:   Relayed following to patient: \"It looks like you are active on [employer], are you able to join the visit this way? If not, do you need us to send you a link now or would you like your provider to send a link via text or email when they are ready to initiate the visit?\"      Patient Confirmed they will join visit via: [employer]  Reminded patient to ensure they were logged on to virtual visit by arrival time listed.   Asked if patient has flexibility to initiate visit sooner than arrival time: patient is unable to initiate visit earlier than arrival time     If pediatric virtual visit, ensured pediatric patient along with parent/guardian will be present for video visit.     Patient offered the website [email or web address] and/or phone number to [employer] Help line: [phone] Mary is a 54 year old who is being evaluated via a billable video visit.          Assessment & Plan     1.  Difficulty voiding.  Hesitancy and stopping questions related to groin pain that he is also experiencing.  Urologist has assessed him and has recommended cystoscopy which he has not followed through.  We discussed that performing a cystoscopy with be beneficial in at least knowing whether he has an anatomical issue and obstructive problem that could account for his symptoms.  Also discussed that it is possible that the anticipation of the pain while urinating may also be affecting his maturation.  Perhaps a pelvic floor therapy would be helpful.  He decided to start with cystoscopy and then decide on pelvic floor therapy.  2.  Chronic groin pain right following hernia repair.  Is already has been seen by his surgeon and the plan is to wait 3 months and see how he does.  He will be " reassessed.  Question of possibility of inguinal nerve entrapment syndrome.    Elmo Hernandez is a 54 year old, presenting for the following health issues:  Urinary Problem      5/20/2025     5:11 PM   Additional Questions   Roomed by Tatiana CLARKE   Accompanied by self     History of Present Illness       Reason for visit:  Urinary Hesitancy   He is taking medications regularly.        Genitourinary - Male  Onset/Duration: ongoing more than a month   Description:   Dysuria (painful urination): YES}  Hematuria (blood in urine): No  Frequency: No  Waking at night to urinate: No  Hesitancy (delay in urine): YES  Retention (unable to empty): YES  Decrease in urinary flow: YES  Incontinence: No  Progression of Symptoms:  same and constant  Accompanying Signs & Symptoms:  Fever: No  Back/Flank pain: No  Urethral discharge: No  Testicle lumps/masses/pain: No  Nausea and/or vomiting: No  Abdominal pain: No  History:   History of frequent UTI s: No  History of kidney stones: No  History of hernias: YES  Personal or Family history of Prostate problems: No  Sexually active: No  Precipitating or alleviating factors: None  Therapies tried and outcome: none    Patient set up an appointment to discuss his urinary symptoms.  He has already been seen by urologist.  When he voids and the stream is slow and then he has to stop and start issue.  He also has chronic right groin pain and at times he feels the pain may be affecting his urination.    Review of Systems  Constitutional, HEENT, cardiovascular, pulmonary, GI, , musculoskeletal, neuro, skin, endocrine and psych systems are negative, except as otherwise noted.      Objective           Vitals:  No vitals were obtained today due to virtual visit.    Physical Exam   GENERAL: alert and no distress  EYES: Eyes grossly normal to inspection.  No discharge or erythema, or obvious scleral/conjunctival abnormalities.  RESP: No audible wheeze, cough, or visible cyanosis.    SKIN: Visible  skin clear. No significant rash, abnormal pigmentation or lesions.  NEURO: Cranial nerves grossly intact.  Mentation and speech appropriate for age.  PSYCH: Appropriate affect, tone, and pace of words    Lab on 05/18/2025   Component Date Value Ref Range Status    Cyclosporine 05/18/2025 31 (L)  50 - 400 ug/L Final    Comment: Cyclosporine Reference Range (ug/L):    Kidney Transplant:  Pediatric  0-3 months post transplant: 175-200  3-6 months post transplant: 150-175  6-9 months post transplant: 125-150  9-12 months post transplant: 100-125  >12 months post transplant:     Adult  0-3 months post transplant: 175-200  3-6 months post transplant: 150-200  6-12 months post transplant: 125-150  >12 months post transplant:     Heart Transplant:  Pediatric  0-12 months post transplant: 200-250  >12 months post transplant: 100-125    Adult  0-3 months post transplant: 150-250  0-3 months post transplant: 150-250  3-6 months post transplant: 125-225  6-12 months post transplant: 100-200  >12 months post transplant:     Heart-Lung Block: 200-250    Lung Transplant:  0-12 months post transplant: 175-225  >12 months post transplant: 125-175    Liver Transplant:  0-6 months post transplant: 150-200  >6 months post transplant: 100-150    Pancreas Transplant:  0-6 months post transplant: 200-250  6-12                            months post transplant: 150-200  >12 months post transplant: 100-150    Bone Marrow Transplant: 200-400     Cyclosporine Last Dose Date 05/18/2025 5/17/2025   Final    Cyclosporine Last Dose Time 05/18/2025  7:40 PM   Final    Creatinine Urine mg/dL 05/18/2025 64.1  mg/dL Final    The reference ranges have not been established in urine creatinine. The results should be integrated into the clinical context for interpretation.    Albumin Urine mg/L 05/18/2025 <12.0  mg/L Final    The reference ranges have not been established in urine albumin. The results should be integrated into the  clinical context for interpretation.    Albumin Urine mg/g Cr 05/18/2025    Final    Unable to calculate, urine albumin and/or urine creatinine is outside detectable limits.  Microalbuminuria is defined as an albumin:creatinine ratio of 17 to 299 for males and 25 to 299 for females. A ratio of albumin:creatinine of 300 or higher is indicative of overt proteinuria.  Due to biologic variability, positive results should be confirmed by a second, first-morning random or 24-hour timed urine specimen. If there is discrepancy, a third specimen is recommended. When 2 out of 3 results are in the microalbuminuria range, this is evidence for incipient nephropathy and warrants increased efforts at glucose control, blood pressure control, and institution of therapy with an angiotensin-converting-enzyme (ACE) inhibitor (if the patient can tolerate it).           Video-Visit Details    Type of service:  Video Visit   Originating Location (pt. Location): Home    Distant Location (provider location):  On-site  Platform used for Video Visit: [employer]  Signed Electronically by: Jimmie Hoyt MD

## 2025-05-21 ENCOUNTER — TELEPHONE (OUTPATIENT)
Dept: UROLOGY | Facility: CLINIC | Age: 55
End: 2025-05-21
Payer: COMMERCIAL

## 2025-05-21 NOTE — TELEPHONE ENCOUNTER
[facility] Call Center    Phone Message    May a detailed message be left on voicemail: yes     Reason for Call: Appointment Intake    Referring Provider Name: Levin  Diagnosis and/or Symptoms: cysto/uroflow/bladder scan. Patient is willing to be seen in [place] or [place] location.     Action Taken: Message routed to:  Other: [facility] uro    Travel Screening: Not Applicable     Date of Service:

## 2025-06-03 ENCOUNTER — MYC MEDICAL ADVICE (OUTPATIENT)
Dept: TRANSPLANT | Facility: CLINIC | Age: 55
End: 2025-06-03
Payer: COMMERCIAL

## 2025-06-03 ENCOUNTER — MYC MEDICAL ADVICE (OUTPATIENT)
Dept: ORTHOPEDICS | Facility: CLINIC | Age: 55
End: 2025-06-03
Payer: COMMERCIAL

## 2025-06-03 NOTE — TELEPHONE ENCOUNTER
Message from Adina asking me to give them a call re: Mary's bowels.    His stools are yellow, no diarrhea.  More constipated than anything.  Suggested fruit, vegetables and water.  If no bowel movement for 2 days, miralax.  If by the next day still no significant stool, fleets enema.  If more than 3 liquid stools in a day, stool cultures.

## 2025-06-06 ENCOUNTER — MYC MEDICAL ADVICE (OUTPATIENT)
Dept: EDUCATION SERVICES | Facility: CLINIC | Age: 55
End: 2025-06-06

## 2025-06-06 DIAGNOSIS — Z79.4 TYPE 2 DIABETES MELLITUS WITH HYPERGLYCEMIA, WITH LONG-TERM CURRENT USE OF INSULIN (H): ICD-10-CM

## 2025-06-06 DIAGNOSIS — Z94.4 LIVER REPLACED BY TRANSPLANT (H): Chronic | ICD-10-CM

## 2025-06-06 DIAGNOSIS — E11.65 TYPE 2 DIABETES MELLITUS WITH HYPERGLYCEMIA, WITH LONG-TERM CURRENT USE OF INSULIN (H): ICD-10-CM

## 2025-06-09 ENCOUNTER — OFFICE VISIT (OUTPATIENT)
Dept: UROLOGY | Facility: CLINIC | Age: 55
End: 2025-06-09
Payer: COMMERCIAL

## 2025-06-09 DIAGNOSIS — R39.11 HESITANCY OF MICTURITION: Primary | ICD-10-CM

## 2025-06-09 PROCEDURE — 51741 ELECTRO-UROFLOWMETRY FIRST: CPT | Performed by: UROLOGY

## 2025-06-09 PROCEDURE — 52000 CYSTOURETHROSCOPY: CPT | Performed by: UROLOGY

## 2025-06-09 PROCEDURE — 51798 US URINE CAPACITY MEASURE: CPT | Performed by: UROLOGY

## 2025-06-09 RX ORDER — INSULIN DEGLUDEC 100 U/ML
INJECTION, SOLUTION SUBCUTANEOUS
Qty: 15 ML | Refills: 11 | Status: SHIPPED | OUTPATIENT
Start: 2025-06-09

## 2025-06-09 NOTE — TELEPHONE ENCOUNTER
Updated Tresiba order to reflect daily dose of 24 units daily.    Beronica Curtis RD, LD [facility]  Diabetes Educator

## 2025-06-09 NOTE — PATIENT INSTRUCTIONS
You have been referred to PT for pelvic floor health.   [facility] will call you to coordinate your care as prescribed by your provider. If you don't hear from a representative within 2 business days, please call [phone].    Please call [phone] with any questions regarding today's visit.

## 2025-06-09 NOTE — PROGRESS NOTES
S: Mary Lopez is a 54 year old male returns for urinary hesitancy.    Uroflow/ru:  voided vol 287 ml with max flow of 27.5 ml/sec and ave flow of 10.1 ml/sec    Patient is draped and prepped.  Flexible cystoscopy placed under direct vision.      The anterior urethra is normal   The prostatic urethra is normal.     The length is 1cm,  the coaptation is 1 cm.     In the bladder there is trabeculation grade 1.    Assessment/Plan:  (R39.11) Hesitancy of micturition  (primary encounter diagnosis)  Comment:    Plan: suspect voiding dysfunction.            Referral to physical therapy next,.

## 2025-06-09 NOTE — TELEPHONE ENCOUNTER
Last Written Prescription:  empagliflozin (JARDIANCE) 25 MG TABS tablet 90 tablet 1 12/6/2024     ----------------------  Last Visit Date: 9/4/24  Future Visit Date: 7/31/25  ----------------------    Refill decision: Medication refilled per  Medication Refill in Ambulatory Care  policy.      Request from pharmacy:  Requested Prescriptions   Pending Prescriptions Disp Refills    empagliflozin (JARDIANCE) 25 MG TABS tablet 90 tablet 1     Sig: Take 1 tablet (25 mg) by mouth daily.       Sodium Glucose Co-Transport Inhibitor Agents Failed - 6/9/2025  9:12 AM        Failed - Recent (6 month) or future (90 days) visit with the authorizing provider's specialty (provided they have been seen in the past 9 months)     The patient must have completed an in-person or virtual visit within the past 6 months or has a future visit scheduled within the next 90 days with the authorizing provider s specialty.  Urgent care and e-visits do not quality as an office visit for this protocol.          Passed - Patient has documented A1c within the specified period of time.     If HgbA1C is 8 or greater, it needs to be on file within the past 3 months.  If less than 8, must be on file within the past 6 months.     Recent Labs   Lab Test 04/13/25  0945   A1C 8.3*             Passed - Medication is active on med list and the sig matches. RN to manually verify dose and sig if red X/fail.     If the protocol passes (green check), you do not need to verify med dose and sig.    A prescription matches if they are the same clinical intention.    For Example: once daily and every morning are the same.    The protocol can not identify upper and lower case letters as matching and will fail.     For Example: Take 1 tablet (50 mg) by mouth daily     TAKE 1 TABLET (50 MG) BY MOUTH DAILY    For all fails (red x), verify dose and sig.    If the refill does match what is on file, the RN can still proceed to approve the refill request.       If they do not  match, route to the appropriate provider.             Passed - Medication indicated for associated diagnosis     Medication is associated with one or more of the following diagnoses:     Diabetic nephropathy, With Albuminuria - Type 2 diabetes mellitus     Disorder of cardiovascular system; Prophylaxis - Type 2 diabetes mellitus     Type 2 diabetes mellitus    Disorder of cardiovascular system; Prophylaxis - Heart failure   Chronic kidney disease, (At risk of progression) to reduce the risk of sustained   estimated GFR decline, end-stage kidney disease, cardiovascular death,   and hospitalization for heart failure     Heart failure, (NYHA class II to IV, reduced ejection fraction) to reduce risk of  cardiovascular death and hospitalization           Passed - Has GFR on file in past 12 months and most recent value is >30        Passed - Patient is age 18 or older        Passed - Patient has documented normal Potassium within the last 12 mos.     Recent Labs   Lab Test 05/05/25  0832   POTASSIUM 4.9

## 2025-06-16 ENCOUNTER — MYC MEDICAL ADVICE (OUTPATIENT)
Dept: PALLIATIVE MEDICINE | Facility: CLINIC | Age: 55
End: 2025-06-16
Payer: COMMERCIAL

## 2025-06-16 DIAGNOSIS — R10.84 ABDOMINAL PAIN, GENERALIZED: ICD-10-CM

## 2025-06-16 DIAGNOSIS — G89.28 OTHER CHRONIC POSTPROCEDURAL PAIN: Primary | ICD-10-CM

## 2025-06-17 NOTE — TELEPHONE ENCOUNTER
"Per mychart message;       Pt would like to move forward with medication discussed at last appt. POV 6/6/25 \"If you want to try a low dose of Duloxetine (20mg) to improve benefits of Pregabalin, send a [employer] message and I will send in a prescription.\"    Order prepped, please sign if appropriate. Pt also requesting opioid pain medication for his \"bad days\"     Routing to provider    Cindy Persaud RN    "

## 2025-06-18 RX ORDER — OXYCODONE AND ACETAMINOPHEN 5; 325 MG/1; MG/1
1 TABLET ORAL EVERY 6 HOURS PRN
Qty: 15 TABLET | Refills: 0 | Status: SHIPPED | OUTPATIENT
Start: 2025-06-18

## 2025-06-18 RX ORDER — DULOXETIN HYDROCHLORIDE 30 MG/1
30 CAPSULE, DELAYED RELEASE ORAL DAILY
Qty: 30 CAPSULE | Refills: 0 | Status: SHIPPED | OUTPATIENT
Start: 2025-06-18

## 2025-06-18 NOTE — TELEPHONE ENCOUNTER
Okay to start Duloxetine.   Trial Percocet 5/325mg -1 tab every 6 hours as needed for severe pain; max 2 per day.     6/18/2025 5:36 PM     REFILL REQUEST:     This is a patient of mine. PDMP and Chart have been reviewed; refill request is appropriate.    Refilled for 30 day supply.  Script e-Prescribed to pharmacy    LESA Mendez, JOHN, FNP-C

## 2025-06-22 ENCOUNTER — LAB (OUTPATIENT)
Dept: LAB | Facility: CLINIC | Age: 55
End: 2025-06-22
Payer: COMMERCIAL

## 2025-06-22 DIAGNOSIS — K70.11 ALCOHOLIC HEPATITIS WITH ASCITES (H): ICD-10-CM

## 2025-06-22 DIAGNOSIS — Z94.4 LIVER REPLACED BY TRANSPLANT (H): ICD-10-CM

## 2025-06-22 DIAGNOSIS — K90.9 STEATORRHEA: ICD-10-CM

## 2025-06-22 DIAGNOSIS — Z94.4 LIVER TRANSPLANTED (H): ICD-10-CM

## 2025-06-22 DIAGNOSIS — D70.9 NEUTROPENIA: ICD-10-CM

## 2025-06-22 LAB
BASOPHILS # BLD AUTO: 0.1 10E3/UL (ref 0–0.2)
BASOPHILS NFR BLD AUTO: 1 %
EOSINOPHIL # BLD AUTO: 1.9 10E3/UL (ref 0–0.7)
EOSINOPHIL NFR BLD AUTO: 21 %
ERYTHROCYTE [DISTWIDTH] IN BLOOD BY AUTOMATED COUNT: 13.2 % (ref 10–15)
HCT VFR BLD AUTO: 37.7 % (ref 40–53)
HGB BLD-MCNC: 12.5 G/DL (ref 13.3–17.7)
IMM GRANULOCYTES # BLD: 0 10E3/UL
IMM GRANULOCYTES NFR BLD: 0 %
LYMPHOCYTES # BLD AUTO: 1.1 10E3/UL (ref 0.8–5.3)
LYMPHOCYTES NFR BLD AUTO: 12 %
MCH RBC QN AUTO: 29.2 PG (ref 26.5–33)
MCHC RBC AUTO-ENTMCNC: 33.2 G/DL (ref 31.5–36.5)
MCV RBC AUTO: 88 FL (ref 78–100)
MCV RBC AUTO: 88 FL (ref 78–100)
MONOCYTES # BLD AUTO: 0.5 10E3/UL (ref 0–1.3)
MONOCYTES NFR BLD AUTO: 5 %
NEUTROPHILS # BLD AUTO: 5.4 10E3/UL (ref 1.6–8.3)
NEUTROPHILS NFR BLD AUTO: 61 %
PLATELET # BLD AUTO: 210 10E3/UL (ref 150–450)
RBC # BLD AUTO: 4.28 10E6/UL (ref 4.4–5.9)
WBC # BLD AUTO: 8.9 10E3/UL (ref 4–11)
WBC # BLD AUTO: 8.9 10E3/UL (ref 4–11)

## 2025-06-22 PROCEDURE — 85025 COMPLETE CBC W/AUTO DIFF WBC: CPT

## 2025-06-22 PROCEDURE — 80158 DRUG ASSAY CYCLOSPORINE: CPT

## 2025-06-22 PROCEDURE — 36415 COLL VENOUS BLD VENIPUNCTURE: CPT

## 2025-06-22 PROCEDURE — 83690 ASSAY OF LIPASE: CPT

## 2025-06-22 PROCEDURE — 84100 ASSAY OF PHOSPHORUS: CPT

## 2025-06-22 PROCEDURE — 83735 ASSAY OF MAGNESIUM: CPT

## 2025-06-22 PROCEDURE — 80053 COMPREHEN METABOLIC PANEL: CPT

## 2025-06-22 PROCEDURE — 82248 BILIRUBIN DIRECT: CPT

## 2025-06-22 PROCEDURE — 82150 ASSAY OF AMYLASE: CPT

## 2025-06-23 ENCOUNTER — RESULTS FOLLOW-UP (OUTPATIENT)
Dept: TRANSPLANT | Facility: CLINIC | Age: 55
End: 2025-06-23
Payer: COMMERCIAL

## 2025-06-23 LAB
ALBUMIN SERPL BCG-MCNC: 4 G/DL (ref 3.5–5.2)
ALP SERPL-CCNC: 191 U/L (ref 40–150)
ALT SERPL W P-5'-P-CCNC: 55 U/L (ref 0–70)
AST SERPL W P-5'-P-CCNC: 35 U/L (ref 0–45)
BILIRUB SERPL-MCNC: 0.3 MG/DL
BILIRUBIN DIRECT (ROCHE PRO & PURE): 0.11 MG/DL (ref 0–0.45)
CYCLOSPORINE BLD LC/MS/MS-MCNC: 26 UG/L (ref 50–400)
MAGNESIUM SERPL-MCNC: 1.9 MG/DL (ref 1.7–2.3)
PHOSPHATE SERPL-MCNC: 4.4 MG/DL (ref 2.5–4.5)
PROT SERPL-MCNC: 6.8 G/DL (ref 6.4–8.3)
TME LAST DOSE: ABNORMAL H
TME LAST DOSE: ABNORMAL H

## 2025-06-24 DIAGNOSIS — N18.32 CHRONIC KIDNEY DISEASE, STAGE 3B (H): ICD-10-CM

## 2025-06-24 DIAGNOSIS — Z94.4 LIVER REPLACED BY TRANSPLANT (H): Chronic | ICD-10-CM

## 2025-06-24 RX ORDER — CYCLOSPORINE 25 MG/1
50 CAPSULE, LIQUID FILLED ORAL EVERY 12 HOURS
Qty: 180 CAPSULE | Refills: 3 | Status: SHIPPED | OUTPATIENT
Start: 2025-06-24

## 2025-06-24 RX ORDER — CYCLOSPORINE 100 MG/1
100 CAPSULE, LIQUID FILLED ORAL EVERY 12 HOURS
Qty: 180 CAPSULE | Refills: 3 | Status: SHIPPED | OUTPATIENT
Start: 2025-06-24

## 2025-06-24 NOTE — TELEPHONE ENCOUNTER
ISSUE:   Cyclosporine level 26 on 6/22, goal , dose 100 mg in the AM and 125 mg in the PM.    PLAN:   Call Patient and confirm this was an accurate 12-hour trough.   Verify Cyclosporine dose.   Confirm no new medications or or missed doses.   Confirm no new illness / infection / diarrhea.   If accurate trough and accurate dose, increase Cyclosporine dose to 150 mg BID     Is this more than a 50% increase or decrease in current IS dose: Yes  If YES, justification: level is too low    Repeat CSA level and hepatic panel on Monday.  Lore Mckeon RN     OUTCOME:   Spoke with Patient, they confirm accurate trough level and current dose 100 mg am, 125 mg pm.   Patient confirmed dose change to 150 mg BID.  Patient agreed to repeat labs in 1 week.   Orders sent to preferred pharmacy for dose change and lab for repeat labs.   Patient voiced understanding of plan.      Valentina Enamorado LPN

## 2025-06-25 ENCOUNTER — MYC MEDICAL ADVICE (OUTPATIENT)
Dept: FAMILY MEDICINE | Facility: CLINIC | Age: 55
End: 2025-06-25

## 2025-06-25 ENCOUNTER — VIRTUAL VISIT (OUTPATIENT)
Dept: FAMILY MEDICINE | Facility: CLINIC | Age: 55
End: 2025-06-25
Payer: COMMERCIAL

## 2025-06-25 ENCOUNTER — TELEPHONE (OUTPATIENT)
Dept: TRANSPLANT | Facility: CLINIC | Age: 55
End: 2025-06-25

## 2025-06-25 DIAGNOSIS — E11.69 DIABETES MELLITUS ASSOCIATED WITH PANCREATIC DISEASE (H): ICD-10-CM

## 2025-06-25 DIAGNOSIS — K90.9 STEATORRHEA: Primary | ICD-10-CM

## 2025-06-25 DIAGNOSIS — T86.41 LIVER TRANSPLANT REJECTION (H): ICD-10-CM

## 2025-06-25 DIAGNOSIS — K86.9 DIABETES MELLITUS ASSOCIATED WITH PANCREATIC DISEASE (H): ICD-10-CM

## 2025-06-25 DIAGNOSIS — Z94.4 LIVER TRANSPLANTED (H): Primary | ICD-10-CM

## 2025-06-25 DIAGNOSIS — Z94.4 LIVER REPLACED BY TRANSPLANT (H): Chronic | ICD-10-CM

## 2025-06-25 LAB
AMYLASE SERPL-CCNC: 49 U/L (ref 28–100)
ANION GAP SERPL CALCULATED.3IONS-SCNC: 14 MMOL/L (ref 7–15)
BUN SERPL-MCNC: 24.7 MG/DL (ref 6–20)
CALCIUM SERPL-MCNC: 10.6 MG/DL (ref 8.8–10.4)
CHLORIDE SERPL-SCNC: 107 MMOL/L (ref 98–107)
CREAT SERPL-MCNC: 1.3 MG/DL (ref 0.67–1.17)
EGFRCR SERPLBLD CKD-EPI 2021: 66 ML/MIN/1.73M2
GLUCOSE SERPL-MCNC: 112 MG/DL (ref 70–99)
HCO3 SERPL-SCNC: 19 MMOL/L (ref 22–29)
LIPASE SERPL-CCNC: 22 U/L (ref 13–60)
POTASSIUM SERPL-SCNC: 5.4 MMOL/L (ref 3.4–5.3)
SODIUM SERPL-SCNC: 140 MMOL/L (ref 135–145)

## 2025-06-25 PROCEDURE — 98005 SYNCH AUDIO-VIDEO EST LOW 20: CPT | Performed by: INTERNAL MEDICINE

## 2025-06-25 NOTE — PROGRESS NOTES
"Mary is a 54 year old who is being evaluated via a billable video visit.    What phone number would you like to be contacted at?   How would you like to obtain your AVS? [facility]      Assessment & Plan     1.  Steatorrhea based on history.  I will check for fat in stool, also lipase and amylase.  He is going to have LFTs done.  I will then get back to him further recommendation.  Wonder if he is developing pancreatic exocrine dysfunction.  2.  Liver replaced by transplant.  He did have a biliary stent that has been removed.  I think it was removed about 4 months ago.  3.  Diabetes mellitus associated with pancreatic disease.    BMI  Estimated body mass index is 31.59 kg/m  as calculated from the following:    Height as of 4/1/25: 1.765 m (5' 9.5\").    Weight as of 5/12/25: 98.4 kg (217 lb).         Subjective   Mary is a 54 year old, presenting for the following health issues:  Consult (Diarrhea for about 1 months.  Stools are loose and removed and oily.  )    History of Present Illness       Reason for visit:  Anal incontinence  Symptom onset:  3-4 weeks ago  Symptoms include:  Woke up twice now with bowel stains on bed.  Its oily and liquidy  Symptom intensity:  Mild  Symptom progression:  Staying the same  Had these symptoms before:  No He is missing 1 dose(s) of medications per week.  He is not taking prescribed medications regularly due to remembering to take.        54-year-old has noticed a change in his bowel habits for the past few months.  His stool has become more greasy and is soft.  At times there is no formed stool but it is not watery or diarrhea.  Couple times in the morning is woken up with greasy discharge from the rectum.  No change in weight.  He has been having right groin pain but no other abdominal symptoms.  He does have insulin-dependent diabetes managed by endocrinology.  He has had a liver transplant in following the transplant he had to have biliary stent.  The stent was removed about 4 " months ago.    The patient still has a lot of issues including the right groin pain and shoulder pain.  He is get rotator cuff tear of the shoulder.  He is not able to work.  Gets fatigued and tired easily.  At this point I feel he is permanently disabled.  He is receiving some disability at at this time.    Review of Systems  Constitutional, HEENT, cardiovascular, pulmonary, GI, , musculoskeletal, neuro, skin, endocrine and psych systems are negative, except as otherwise noted.      Objective           Vitals:  No vitals were obtained today due to virtual visit.    Physical Exam   GENERAL: alert and no distress  EYES: Eyes grossly normal to inspection.  No discharge or erythema, or obvious scleral/conjunctival abnormalities.  RESP: No audible wheeze, cough, or visible cyanosis.    SKIN: Visible skin clear. No significant rash, abnormal pigmentation or lesions.  NEURO: Cranial nerves grossly intact.  Mentation and speech appropriate for age.  PSYCH: Appropriate affect, tone, and pace of words          Video-Visit Details    Type of service:  Video Visit   Originating Location (pt. Location): Home    Distant Location (provider location):  On-site  Platform used for Video Visit: [employer]  Signed Electronically by: Jimmie Hoyt MD

## 2025-06-25 NOTE — PROGRESS NOTES
"  If patient has telephone visit, have they been educated on video visit as preferred visit method and offered to change to video visit? yes        Instructions Relayed to Patient by Virtual Roomer:     Patient is active on [facility]:   Relayed following to patient: \"It looks like you are active on [facility], are you able to join the visit this way? If not, do you need us to send you a link now or would you like your provider to send a link via text or email when they are ready to initiate the visit?\"      Patient Confirmed they will join visit via: [employer]  Reminded patient to ensure they were logged on to virtual visit by arrival time listed.   Asked if patient has flexibility to initiate visit sooner than arrival time: patient stated yes, documented in appointment notes availability to initiate visit earlier than arrival time     If pediatric virtual visit, ensured pediatric patient along with parent/guardian will be present for video visit.     Patient offered the website [email or web address] and/or phone number to [facility] Help line: [phone]   Answers submitted by the patient for this visit:  General Questionnaire (Submitted on 6/25/2025)  Chief Complaint: Chronic problems general questions HPI Form  How many days per week do you miss taking your medication?: 1  What makes it hard for you to take your medication every day?: remembering to take  General Concern (Submitted on 6/25/2025)  Chief Complaint: Chronic problems general questions HPI Form  What is the reason for your visit today?: Anal incontinence  When did your symptoms begin?: 3-4 weeks ago  What are your symptoms?: Woke up twice now with bowel stains on bed.  Its oily and liquidy  How would you describe these symptoms?: Mild  Are your symptoms:: Staying the same  Have you had these symptoms before?: No  Questionnaire about: Chronic problems general questions HPI Form (Submitted on 6/25/2025)  Chief Complaint: Chronic problems general " questions HPI Form

## 2025-06-26 ENCOUNTER — TELEPHONE (OUTPATIENT)
Dept: ORTHOPEDICS | Facility: CLINIC | Age: 55
End: 2025-06-26

## 2025-06-26 ENCOUNTER — TELEPHONE (OUTPATIENT)
Dept: PALLIATIVE MEDICINE | Facility: CLINIC | Age: 55
End: 2025-06-26
Payer: COMMERCIAL

## 2025-06-26 NOTE — TELEPHONE ENCOUNTER
Received medical request form from [employer]. Page requesting medical records, faxed to medical records, Right[employer] confirmed. Documents placed in provider's basket, [place].

## 2025-06-27 NOTE — PROGRESS NOTES
"Mary Lopez  :  1970  DOS: 2025  MRN: [id number]  PCP: Jimmie Hoyt    Sports Medicine Clinic Visit    Interim History - 2025  - Last seen on 2025 for a suspected left traumatic tear of the rotator cuff in the setting of known tendinopathy.   - Since the last visit, patient reports no changes, still painful and much weaker.  Patient is here today to discuss MRI results and next steps.   - No interim injury.     - MRI left shoulder 2025 shows:  Impression:  - Complete tears of the supraspinatus and infraspinatus at the footprint  retracted medially to the level of the glenohumeral joint. Mild fatty  infiltration of rotator cuff muscle bulk.      Interim History - 2025  - Last seen on Visit date not found for bilateral rotator cuff tendinopathy. Has started to work with pain management since last visit.  - Bilateral subacromial bursa corticosteroid injections completed on 2025 provided significant relief that continues still for his baseline shoulder pain.      New Injury  - Since the last visit, he notes new left shoulder pain. This pain is in the anterolateral shoulder and has worsening associated weakness.  He notes an inciting injury about 45 days ago. He was pushing his wife in a wheelchair and hit a patch of gravel and feels he really \"wrenched\" on his shoulder at that time. He points to the anterior/lateral aspect as the worst spot of pain.  His pain level has stayed consistent since the event with no change.      - MRI from May 2024 showed moderate grade intrasubstance tearing of the supraspinatus/infraspinatus junction at the footprint, query superior and posterior superior labral tear and possible adhesive capsulitis.        Interim History - 2025  - Last seen on Visit date not found for bilateral rotator cuff tendinopathy.   - Bilateral subacromial corticosteroid injections completed on 2025 provided good relief for 2.5-3 months.    - " Since the last visit, pain has started to be noticeable over the last week or so and worsening.  Similar in character and severity as last time.  Hopeful for repeat injections today for similar relief.   - No interim injury.     Interim History - January 10, 2025  - Last seen on 5/10/2024 for bilateral rotator cuff tendinopathy.   - Bilateral subacromial corticosteroid injections completed on 5/10/2024 provided 6 months of moderate to great relief in pain.    - Since the last visit, he notes a return in bilateral shoulder pain.  Similar in location and character to prior to the last injections.  Takes Tylenol on occasion.  Interested in repeat injections today in hopes of similar relief as last time.  - No interim injury.       Interim History - May 10, 2024  - Last seen on 4/19/2024 for bilateral shoulder pain.     - 5/3/2024 right shoulder MRI Impression:  1. Multifocal low to moderate grade intrasubstance tear of supraspinatus and infraspinatus. No high-grade rotator cuff tear. No muscle bulk loss.  2. Query posterosuperior labral tear.  3. Moderate nonsimple appearing pleural effusion, increased from comparison CT incompletely assessed. Consider further evaluation with chest CT if clinically indicated.    -5/3/2024 left shoulder MRI Impression:  1. Focal moderate grade intrasubstance tear of the supraspinatus/infraspinous junctional fibers at the footprint. No muscle atrophy.  2. Query superior and posterosuperior labral tear.  3.  Findings may be seen in clinical entity of adhesive capsulitis. Please correlate clinically.    - Since the last visit, he notes no change in bilateral shoulder pain. Taking Tramadol only at night. No pain relieving measures during the day at this time. Hopeful for injections today.  Patient received approval from his transplant team and diabetes/endocrine for corticosteroid injections.  - No interim injury.     Initial Visit: April 19, 2024  ZACKERY Lopez is a 53 year  old male who is seen as a self referral presenting with right shoulder pain.    - Mechanism of Injury:    - Fell in 10/2023 on the right shoulder, and has had pain on the anterior aspect that can travel down upper arm intermittently.  Left shoulder is now painful from overuse on the anterior aspect of shoulder. Similar to the right shoulder but not as intense.   - Pertinent history and prior evaluations:    - XR R shoulder 12/22/2023 shows normal anatomic alignment without significant degenerative changes, no acute fractures or dislocations.  - Noted single left rib fracture on 09/2023  - Doing physical therapy for the right shoulder.   - Liver transplant patient (3/4/2024) secondary to alcoholic cirrhosis, with residual memory problems since surgery. Also with T2DM on long and short acting insulin, currently with large uncontrolled fluctuations in glucoses throughout the day, last HbA1c 5.6 two weeks ago.     - Pain Character:    - Location:  Bilateral shoulder  - Character:  sharp  - Duration:  for last 6 months  - Course:  steady  - Endorses:    - weakness due to pain, decreased ROM/flexibility, decreased joint mobility, decreased strength and impaired posture making it difficult to do ADL, work tasks, recreational activities  - Denies:    - clicking/popping, grinding, mechanical locking symptoms, instability, numbness, tingling  - Alleviating factors:    - rest, activity modifications, tylenol, muscle relaxers, lidocaine patches, Voltaren gel  - Aggravating factors:    - lifting heavy objects, overhead activities, handgrip activities  - Other treatments tried:    - tylenol, lidocaine patches, Voltaren gel, flexeril    - Patient Goals:    - understand the cause of the symptoms, be able to manage the symptoms successfully  - Social History:   - On disability. Previous work:        Review of Systems  Musculoskeletal: as above  Remainder of review of systems is negative including constitutional,  CV, pulmonary, GI, Skin and Neurologic except as noted in HPI or medical history.    Past Medical History:   Diagnosis Date    ANGEL (acute kidney injury) 06/27/2023    Alcoholic cirrhosis of liver without ascites (H) 07/13/2023    Alcoholic hepatitis with ascites (H) 10/03/2023    Alcoholic hepatitis without ascites (H) 07/13/2023    Chronic generalized pain disorder 12/25/2024    Chronic groin pain, right 05/20/2025    CKD stage 3a, GFR 45-59 ml/min (H) 08/08/2024    Closed fracture of one rib of left side 09/14/2023    Concussion without loss of consciousness 03/11/2020    Decompensated hepatic cirrhosis (H) 09/15/2023    Diabetes mellitus, type 2 (H) 11/22/2023    Essential hypertension 03/11/2020    Ganglion cyst of wrist, right 04/18/2024    History of biliary duct stent placement 09/05/2024    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)     Liver replaced by transplant (H) 03/05/2024    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    ACR PRAJAPATI 6-7/9, treated with steroids    Mild hyperlipidemia 12/07/2021    Persistent insomnia 07/13/2023    Portal hypertension (H) 07/13/2023    Right inguinal hernia 08/08/2024    Scrotal abscess 06/27/2023    Secondary esophageal 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 (H) 07/13/2023    Tobacco abuse disorder 03/11/2020    Type 2 diabetes mellitus with hyperglycemia (H) 12/22/2023     Past Surgical History:   Procedure Laterality Date    BENCH LIVER  03/05/2024    Procedure: Bench liver;  Surgeon: Ryder Cortez MD;  Location:  OR    CHOLECYSTECTOMY      done at [facility]    COLONOSCOPY N/A 01/02/2024    Procedure: COLONOSCOPY, WITH POLYPECTOMY;  Surgeon: Jak Urbina MD;  Location: [facility] GI    CV RIGHT HEART CATH MEASUREMENTS RECORDED N/A 01/30/2024    Procedure: Right Heart Catheterization;  Surgeon: Alfred Tafoya MD;  Location:  HEART CARDIAC CATH LAB    ENDOSCOPIC RETROGRADE CHOLANGIOPANCREATOGRAM N/A 10/22/2024    Procedure: ENDOSCOPIC RETROGRADE  CHOLANGIOPANCREATOGRAPHY, WITH bile duct stent stent exchange and balloon dilation;  Surgeon: Naldo Rodriguez MD;  Location: [facility] OR    ENDOSCOPIC RETROGRADE CHOLANGIOPANCREATOGRAM N/A 2024    Procedure: ENDOSCOPIC RETROGRADE CHOLANGIOPANCREATOGRAPHY BILLIARY DILATION AND STENT PLACEMENT;  Surgeon: Will Martínez MD;  Location: [facility] OR    ENDOSCOPIC RETROGRADE CHOLANGIOPANCREATOGRAPHY, EXCHANGE TUBE/STENT N/A 2024    Procedure: Endoscopic Retrograde Cholangiopancreatography with biliary sphincterotomy, balloon dilation, pancreatic stent placement,biliary stent exchange;  Surgeon: Naldo Rodriguez MD;  Location: [facility] OR    ENDOSCOPIC RETROGRADE CHOLANGIOPANCREATOGRAPHY, EXCHANGE TUBE/STENT N/A 2025    Procedure: ENDOSCOPIC RETROGRADE CHOLANGIOPANCREATOGRAPHY, WITH sludge removal, and stent removal;  Surgeon: Will Martínez MD;  Location: [facility] OR    ENTEROSCOPY SMALL BOWEL N/A 2024    Procedure: Enteroscopy small bowel;  Surgeon: Hugo Daigle MD;  Location: [facility]    ESOPHAGOSCOPY, GASTROSCOPY, DUODENOSCOPY (EGD), COMBINED N/A 2024    Procedure: Esophagoscopy, gastroscopy, duodenoscopy (EGD), combined;  Surgeon: Hugo Daigle MD;  Location: [facility]    HERNIORRHAPHY INGUINAL Right 2024    Procedure: Open Inguinal Hernia Repair;  Surgeon: Ryder Cortez MD;  Location: [facility] OR    IR LIVER BIOPSY PERCUTANEOUS  03/15/2024    IR RETROPERITONEAL ABSCESS DRAINAGE  2024    TONSILLECTOMY      TRANSPLANT LIVER RECIPIENT  DONOR N/A 2024    Procedure: Transplant liver recipient  donor, bile duct stent placement;  Surgeon: Ryder Cortez MD;  Location: [facility] OR    VASECTOMY       Family History   Problem Relation Age of Onset    Anxiety Disorder Mother     Depression Mother     Bipolar Disorder Mother     Chronic Obstructive Pulmonary Disease Mother     Lung Cancer Mother 81    Morbid Obesity Father     Diabetes Father     Diabetes  Type 2  Brother     Substance Abuse Maternal Grandfather     Substance Abuse Paternal Grandfather     Colon Cancer No family hx of     Liver Disease No family hx of          Objective  There were no vitals taken for this visit.    General: healthy, alert and in no acute distress.    HEENT: no scleral icterus or conjunctival erythema.   Skin: no suspicious lesions or rash. No jaundice.   CV: regular rhythm by palpation, 2+ distal pulses.  Resp: normal respiratory effort without conversational dyspnea.   Psych: normal mood and affect.    Gait: nonantalgic, appropriate coordination and balance.     Neuro:        - Sensation to light touch:    - Intact throughout the BUE including all peripheral nerve distributions.        - MSR:       RUE  LUE  - Biceps  2+ 2+  - Brachioradialis 2+ 2+  - Triceps  2+ 2+       - Special tests:   - Spurling's:  Neg     MSK - Shoulder:       - Inspection:    - No significant swelling, erythema, warmth, ecchymosis, lesion, or atrophy noted.        - ROM:    - Limited AROM in abduction < external rotation.       - Palpation:    - TTP at the lateral shoulder, subacromial space, rotator cuff insertion.   - NTTP elsewhere.        - Strength:  (*antalgic)  - Shoulder Abduction   4*    - Shoulder Flexion   5-*    - Shoulder Internal Rotation  5-*    - Shoulder External Rotation  3+*            - Special tests:        - Santizo: Positive   - Neers: Positive   - Empty can: Positive for pain and weakness    - Winchester:  Neg    - Scarf:  Neg    - Speeds:  Neg    - Yergason:  Neg    - Apprehension/Relocation:  Neg       Radiology  I independently reviewed the available relevant imaging in the chart with my interpretations as above in HPI.   - XR B/L shoulders 4/19/2024 shows normal anatomic alignment without acute fracture or dislocation.  No significant degenerative changes.    I independently reviewed today's new relevant imaging, with the following interpretation:  - MR L shoulder does show new  complete tears of the supraspinatus and infraspinatus at the footprint with retraction.      Assessment  1. Traumatic complete tear of left rotator cuff, initial encounter    2. Tendinopathy of rotator cuff, right        Plan  Mary Lopez is a pleasant 53 year old male that presents with chronic bilateral shoulder pain.  Right shoulder pain started after a fall directly onto the shoulder in October 2023.  He has had anterior shoulder pain since that time that will occasionally radiate down the arm.  He feels antalgic weakness in the shoulder and has been using it less and less due to the pain.  During that time, he has been overcompensating with the left shoulder and started to feel very similar symptoms on the left.  On exam there is positive impingement signs bilaterally with enough antalgic weakness to be concern for the possibility of rotator cuff tear, as well as the possibility of a more traumatic injury on the right.  He and wife are highly in favor of advanced imaging workup at this time and establishing a good short and long-term treatment plan for the shoulders while he is recovering from recent liver transplantation.     We discussed the nature of the condition and available treatment options, and mutually agreed upon the following plan:    - Imaging:          - Reviewed and independently interpreted the relevant imaging in the chart, including any imaging ordered for today's clinic.  - Reviewed results and images with patient.   -Will be important to establish the integrity of the rotator cuff complex with advanced imaging, MRI order has been placed for right and left shoulders and instructions given for scheduling the MRI and follow-up with me afterward.  - Medications:          - Discussed pharmacologic options for pain relief.   - May use NSAIDs (Ibuprofen, Naproxen) or Acetaminophen (Tylenol) as needed for pain control.   - Do not take these if previously advised to avoid them for other  medical conditions.  - May also use topical medications such as lidocaine, IcyHot, BioFreeze, or Voltaren gel as needed for pain control.    - Voltaren gel is an anti-inflammatory cream that may be used up to 4 times per day over the painful area.   - Injections:          - Discussed possible injection options and alternatives.    - Injection options include: Possibility for intra-articular glenohumeral joint injection, subacromial/subdeltoid bursa injection with corticosteroid if okay with transplant team.    - Deferred injections today and will consider them in the future as needed.   - Therapy:          - Discussed the benefits of therapy vs home exercise program for optimization of range of motion, flexibility, strength, stability and function.   - Preference is for therapy.   -Physical therapy referral placed today and instructed to call [phone] to schedule appointments.   - Modalities:          - May use ice, heat, massage or other modalities as needed.  - Surgery:          - Discussed non-operative and operative treatment options for the patient's condition. Goal is to continue conservative care for as long as possible before surgical intervention would need to be considered.  - Activity:          - Encouraged to remain active and participate in regular activities as symptoms allow.   Avoid or modify exacerbating activities as needed.  - Follow up:          - When results of the advanced imaging are available to discuss the results and next steps in treatment.   - May follow up sooner for new/worsening symptoms.  - May contact clinic by phone or MyChart for questions or concerns.     Updated Plan - 5/10/24:  Mary presents to discuss results of his recent MRI which shows rotator cuff tear partial tearing of the supraspinatus and infraspinatus bilaterally, possible labral tears as well.  He is interested in obtaining corticosteroid injections of bilateral subacromial bursa.  Subacromial bursa injections  were performed today in clinic without complication, patient tolerated procedure well.  Also placed an order for physical therapy and instructions given for scheduling therapy appointments.  May contact clinic for questions/concerns.      Updated Plan - 4/12/25:  Mary presents today to follow-up for his bilateral shoulder pain secondary to rotator cuff partial tearing bilaterally.  Subacromial bursa corticosteroid injections given at last visit provided 3+ months of great relief of his symptoms.  Over the past few weeks, his pain has seemed to return in similar character as it was before the last injections.  He is interested in repeat injections today in hopes of gaining similar relief as last time.  Ultrasound-guided bilateral subacromial bursae corticosteroid injections were given today in clinic without complication, patient tolerated procedure well.  Follow-up as needed in the future.      Update - 6/20/25:  Mary presents today to discuss a new injury to the left shoulder.  He reports that the bilateral subacromial injections were working well for him until about 45 days ago when he had a new injury while pushing his wife in a wheelchair.  The wheelchair hit a patch of gravel and he felt a strong jolt in the lateral shoulder, possibly a pop.  It was very painful and weak after that time, and the pain has not subsided by this point.  He does feel like it is slightly different than his baseline rotator cuff pain.  On exam, he still is tender at the rotator cuff insertion but the highest concern is he is much weaker in external rotation than his prior exam.  Abduction still about the same.  I have concern that his infraspinatus tear has progressed and it will be important to evaluate for a complete tear with an updated MRI.  The MRI order has been placed today and instructions given for scheduling the MRI and follow-up with me afterward to discuss the results and next steps.      Update - 7/8/25:  Mary presents  today for follow-up of his recent left shoulder MRI due to new worsening pain and weakness in the shoulder and concern for an acute tear.  Unfortunately, the MRI did confirm that he has sustained a new complete tear of the supraspinatus and infraspinatus tendons at the footprint with significant retraction.  We discussed the findings in detail and available treatment options.  After the detailed discussion and his questions were answered, he would prefer to consult with a rotator cuff surgeon to discuss repair options.  Orthopedic  referral has been placed today and he will follow-up soon with Ortho Surg.      Alex Rico DO, CAQSM  [facility]  [facility] - Department of Orthopedic Surgery       Disclaimer:  This note was prepared and written using Dragon Medical dictation software. As a result, there may be errors in the script that have gone undetected. Please consider this when interpreting the information in this note.

## 2025-06-29 ENCOUNTER — LAB (OUTPATIENT)
Dept: LAB | Facility: CLINIC | Age: 55
End: 2025-06-29
Payer: COMMERCIAL

## 2025-06-29 DIAGNOSIS — K90.9 STEATORRHEA: ICD-10-CM

## 2025-06-29 DIAGNOSIS — N18.32 CHRONIC KIDNEY DISEASE, STAGE 3B (H): ICD-10-CM

## 2025-06-29 LAB
ALBUMIN SERPL BCG-MCNC: 3.9 G/DL (ref 3.5–5.2)
ALP SERPL-CCNC: 198 U/L (ref 40–150)
ALT SERPL W P-5'-P-CCNC: 52 U/L (ref 0–70)
AST SERPL W P-5'-P-CCNC: 38 U/L (ref 0–45)
BILIRUB SERPL-MCNC: 0.2 MG/DL
BILIRUBIN DIRECT (ROCHE PRO & PURE): 0.12 MG/DL (ref 0–0.45)
PROT SERPL-MCNC: 6.8 G/DL (ref 6.4–8.3)

## 2025-06-29 PROCEDURE — 80076 HEPATIC FUNCTION PANEL: CPT

## 2025-06-29 PROCEDURE — 80158 DRUG ASSAY CYCLOSPORINE: CPT

## 2025-06-29 PROCEDURE — 36415 COLL VENOUS BLD VENIPUNCTURE: CPT

## 2025-06-30 ENCOUNTER — RESULTS FOLLOW-UP (OUTPATIENT)
Dept: TRANSPLANT | Facility: CLINIC | Age: 55
End: 2025-06-30
Payer: COMMERCIAL

## 2025-06-30 ENCOUNTER — PATIENT OUTREACH (OUTPATIENT)
Dept: CARE COORDINATION | Facility: CLINIC | Age: 55
End: 2025-06-30
Payer: COMMERCIAL

## 2025-06-30 DIAGNOSIS — Z94.4 LIVER TRANSPLANTED (H): Primary | ICD-10-CM

## 2025-06-30 LAB
CYCLOSPORINE BLD LC/MS/MS-MCNC: 63 UG/L (ref 50–400)
TME LAST DOSE: NORMAL H
TME LAST DOSE: NORMAL H

## 2025-07-02 ENCOUNTER — PATIENT OUTREACH (OUTPATIENT)
Dept: CARE COORDINATION | Facility: CLINIC | Age: 55
End: 2025-07-02
Payer: COMMERCIAL

## 2025-07-07 ENCOUNTER — ANCILLARY PROCEDURE (OUTPATIENT)
Dept: MRI IMAGING | Facility: CLINIC | Age: 55
End: 2025-07-07
Attending: STUDENT IN AN ORGANIZED HEALTH CARE EDUCATION/TRAINING PROGRAM
Payer: COMMERCIAL

## 2025-07-07 DIAGNOSIS — S46.012A TRAUMATIC TEAR OF LEFT ROTATOR CUFF, UNSPECIFIED TEAR EXTENT, INITIAL ENCOUNTER: ICD-10-CM

## 2025-07-07 PROCEDURE — 73221 MRI JOINT UPR EXTREM W/O DYE: CPT | Mod: LT | Performed by: RADIOLOGY

## 2025-07-08 ENCOUNTER — OFFICE VISIT (OUTPATIENT)
Dept: ORTHOPEDICS | Facility: CLINIC | Age: 55
End: 2025-07-08
Payer: COMMERCIAL

## 2025-07-08 DIAGNOSIS — S46.012A TRAUMATIC COMPLETE TEAR OF LEFT ROTATOR CUFF, INITIAL ENCOUNTER: Primary | ICD-10-CM

## 2025-07-08 DIAGNOSIS — M67.911 TENDINOPATHY OF ROTATOR CUFF, RIGHT: ICD-10-CM

## 2025-07-08 PROCEDURE — 99213 OFFICE O/P EST LOW 20 MIN: CPT | Performed by: STUDENT IN AN ORGANIZED HEALTH CARE EDUCATION/TRAINING PROGRAM

## 2025-07-08 NOTE — LETTER
"2025      Mary Lopez  [address]  [place] [address]      Dear Colleague,    Thank you for referring your patient, Mary Lopez, to the [facility]. Please see a copy of my visit note below.    Mary Lopez  :  1970  DOS: 2025  MRN: [id number]  PCP: Jimmie Hoyt    Sports Medicine Clinic Visit    Interim History - 2025  - Last seen on 2025 for a suspected left traumatic tear of the rotator cuff in the setting of known tendinopathy.   - Since the last visit, patient reports no changes, still painful and much weaker.  Patient is here today to discuss MRI results and next steps.   - No interim injury.     - MRI left shoulder 2025 shows:  Impression:  - Complete tears of the supraspinatus and infraspinatus at the footprint  retracted medially to the level of the glenohumeral joint. Mild fatty  infiltration of rotator cuff muscle bulk.      Interim History - 2025  - Last seen on Visit date not found for bilateral rotator cuff tendinopathy. Has started to work with pain management since last visit.  - Bilateral subacromial bursa corticosteroid injections completed on 2025 provided significant relief that continues still for his baseline shoulder pain.      New Injury  - Since the last visit, he notes new left shoulder pain. This pain is in the anterolateral shoulder and has worsening associated weakness.  He notes an inciting injury about 45 days ago. He was pushing his wife in a wheelchair and hit a patch of gravel and feels he really \"wrenched\" on his shoulder at that time. He points to the anterior/lateral aspect as the worst spot of pain.  His pain level has stayed consistent since the event with no change.      - MRI from May 2024 showed moderate grade intrasubstance tearing of the supraspinatus/infraspinatus junction at the footprint, query superior and posterior superior labral tear and possible " adhesive capsulitis.        Interim History - April 12, 2025  - Last seen on Visit date not found for bilateral rotator cuff tendinopathy.   - Bilateral subacromial corticosteroid injections completed on 4/12/2025 provided good relief for 2.5-3 months.    - Since the last visit, pain has started to be noticeable over the last week or so and worsening.  Similar in character and severity as last time.  Hopeful for repeat injections today for similar relief.   - No interim injury.     Interim History - January 10, 2025  - Last seen on 5/10/2024 for bilateral rotator cuff tendinopathy.   - Bilateral subacromial corticosteroid injections completed on 5/10/2024 provided 6 months of moderate to great relief in pain.    - Since the last visit, he notes a return in bilateral shoulder pain.  Similar in location and character to prior to the last injections.  Takes Tylenol on occasion.  Interested in repeat injections today in hopes of similar relief as last time.  - No interim injury.       Interim History - May 10, 2024  - Last seen on 4/19/2024 for bilateral shoulder pain.     - 5/3/2024 right shoulder MRI Impression:  1. Multifocal low to moderate grade intrasubstance tear of supraspinatus and infraspinatus. No high-grade rotator cuff tear. No muscle bulk loss.  2. Query posterosuperior labral tear.  3. Moderate nonsimple appearing pleural effusion, increased from comparison CT incompletely assessed. Consider further evaluation with chest CT if clinically indicated.    -5/3/2024 left shoulder MRI Impression:  1. Focal moderate grade intrasubstance tear of the supraspinatus/infraspinous junctional fibers at the footprint. No muscle atrophy.  2. Query superior and posterosuperior labral tear.  3.  Findings may be seen in clinical entity of adhesive capsulitis. Please correlate clinically.    - Since the last visit, he notes no change in bilateral shoulder pain. Taking Tramadol only at night. No pain relieving measures during  the day at this time. Hopeful for injections today.  Patient received approval from his transplant team and diabetes/endocrine for corticosteroid injections.  - No interim injury.     Initial Visit: April 19, 2024  HPI  Mary Lopez is a 53 year old male who is seen as a self referral presenting with right shoulder pain.    - Mechanism of Injury:    - Fell in 10/2023 on the right shoulder, and has had pain on the anterior aspect that can travel down upper arm intermittently.  Left shoulder is now painful from overuse on the anterior aspect of shoulder. Similar to the right shoulder but not as intense.   - Pertinent history and prior evaluations:    - XR R shoulder 12/22/2023 shows normal anatomic alignment without significant degenerative changes, no acute fractures or dislocations.  - Noted single left rib fracture on 09/2023  - Doing physical therapy for the right shoulder.   - Liver transplant patient (3/4/2024) secondary to alcoholic cirrhosis, with residual memory problems since surgery. Also with T2DM on long and short acting insulin, currently with large uncontrolled fluctuations in glucoses throughout the day, last HbA1c 5.6 two weeks ago.     - Pain Character:    - Location:  Bilateral shoulder  - Character:  sharp  - Duration:  for last 6 months  - Course:  steady  - Endorses:    - weakness due to pain, decreased ROM/flexibility, decreased joint mobility, decreased strength and impaired posture making it difficult to do ADL, work tasks, recreational activities  - Denies:    - clicking/popping, grinding, mechanical locking symptoms, instability, numbness, tingling  - Alleviating factors:    - rest, activity modifications, tylenol, muscle relaxers, lidocaine patches, Voltaren gel  - Aggravating factors:    - lifting heavy objects, overhead activities, handgrip activities  - Other treatments tried:    - tylenol, lidocaine patches, Voltaren gel, flexeril    - Patient Goals:    - understand the cause  of the symptoms, be able to manage the symptoms successfully  - Social History:   - On disability. Previous work:        Review of Systems  Musculoskeletal: as above  Remainder of review of systems is negative including constitutional, CV, pulmonary, GI, Skin and Neurologic except as noted in HPI or medical history.    Past Medical History:   Diagnosis Date     ANGEL (acute kidney injury) 06/27/2023     Alcoholic cirrhosis of liver without ascites (H) 07/13/2023     Alcoholic hepatitis with ascites (H) 10/03/2023     Alcoholic hepatitis without ascites (H) 07/13/2023     Chronic generalized pain disorder 12/25/2024     Chronic groin pain, right 05/20/2025     CKD stage 3a, GFR 45-59 ml/min (H) 08/08/2024     Closed fracture of one rib of left side 09/14/2023     Concussion without loss of consciousness 03/11/2020     Decompensated hepatic cirrhosis (H) 09/15/2023     Diabetes mellitus, type 2 (H) 11/22/2023     Essential hypertension 03/11/2020     Ganglion cyst of wrist, right 04/18/2024     History of biliary duct stent placement 09/05/2024     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)      Liver replaced by transplant (H) 03/05/2024     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    ACR PRAJAPATI 6-7/9, treated with steroids     Mild hyperlipidemia 12/07/2021     Persistent insomnia 07/13/2023     Portal hypertension (H) 07/13/2023     Right inguinal hernia 08/08/2024     Scrotal abscess 06/27/2023     Secondary esophageal 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 (H) 07/13/2023     Tobacco abuse disorder 03/11/2020     Type 2 diabetes mellitus with hyperglycemia (H) 12/22/2023     Past Surgical History:   Procedure Laterality Date     BENCH LIVER  03/05/2024    Procedure: Bench liver;  Surgeon: Ryder Cortez MD;  Location: [facility] OR     CHOLECYSTECTOMY      done at [facility]     COLONOSCOPY N/A 01/02/2024    Procedure: COLONOSCOPY, WITH POLYPECTOMY;  Surgeon: Jak Urbina MD;  Location:  GI      CV RIGHT HEART CATH MEASUREMENTS RECORDED N/A 2024    Procedure: Right Heart Catheterization;  Surgeon: Alfred Tafoya MD;  Location: [facility] HEART CARDIAC CATH LAB     ENDOSCOPIC RETROGRADE CHOLANGIOPANCREATOGRAM N/A 10/22/2024    Procedure: ENDOSCOPIC RETROGRADE CHOLANGIOPANCREATOGRAPHY, WITH bile duct stent stent exchange and balloon dilation;  Surgeon: Naldo Rodriguez MD;  Location: [facility] OR     ENDOSCOPIC RETROGRADE CHOLANGIOPANCREATOGRAM N/A 2024    Procedure: ENDOSCOPIC RETROGRADE CHOLANGIOPANCREATOGRAPHY BILLIARY DILATION AND STENT PLACEMENT;  Surgeon: Will Martínez MD;  Location: [facility] OR     ENDOSCOPIC RETROGRADE CHOLANGIOPANCREATOGRAPHY, EXCHANGE TUBE/STENT N/A 2024    Procedure: Endoscopic Retrograde Cholangiopancreatography with biliary sphincterotomy, balloon dilation, pancreatic stent placement,biliary stent exchange;  Surgeon: Naldo Rodriguez MD;  Location: [facility] OR     ENDOSCOPIC RETROGRADE CHOLANGIOPANCREATOGRAPHY, EXCHANGE TUBE/STENT N/A 2025    Procedure: ENDOSCOPIC RETROGRADE CHOLANGIOPANCREATOGRAPHY, WITH sludge removal, and stent removal;  Surgeon: Will Martínez MD;  Location: [facility] OR     ENTEROSCOPY SMALL BOWEL N/A 2024    Procedure: Enteroscopy small bowel;  Surgeon: Hugo Daigle MD;  Location: [facility] GI     ESOPHAGOSCOPY, GASTROSCOPY, DUODENOSCOPY (EGD), COMBINED N/A 2024    Procedure: Esophagoscopy, gastroscopy, duodenoscopy (EGD), combined;  Surgeon: Hugo Daigle MD;  Location: [facility] GI     HERNIORRHAPHY INGUINAL Right 2024    Procedure: Open Inguinal Hernia Repair;  Surgeon: Ryder Cortez MD;  Location: [facility] OR     IR LIVER BIOPSY PERCUTANEOUS  03/15/2024     IR RETROPERITONEAL ABSCESS DRAINAGE  2024     TONSILLECTOMY       TRANSPLANT LIVER RECIPIENT  DONOR N/A 2024    Procedure: Transplant liver recipient  donor, bile duct stent placement;  Surgeon: Ryder Cortez MD;   Location: [facility] OR     VASECTOMY       Family History   Problem Relation Age of Onset     Anxiety Disorder Mother      Depression Mother      Bipolar Disorder Mother      Chronic Obstructive Pulmonary Disease Mother      Lung Cancer Mother 81     Morbid Obesity Father      Diabetes Father      Diabetes Type 2  Brother      Substance Abuse Maternal Grandfather      Substance Abuse Paternal Grandfather      Colon Cancer No family hx of      Liver Disease No family hx of          Objective  There were no vitals taken for this visit.    General: healthy, alert and in no acute distress.    HEENT: no scleral icterus or conjunctival erythema.   Skin: no suspicious lesions or rash. No jaundice.   CV: regular rhythm by palpation, 2+ distal pulses.  Resp: normal respiratory effort without conversational dyspnea.   Psych: normal mood and affect.    Gait: nonantalgic, appropriate coordination and balance.     Neuro:        - Sensation to light touch:    - Intact throughout the BUE including all peripheral nerve distributions.        - MSR:       RUE  LUE  - Biceps  2+ 2+  - Brachioradialis 2+ 2+  - Triceps  2+ 2+       - Special tests:   - Spurling's:  Neg     MSK - Shoulder:       - Inspection:    - No significant swelling, erythema, warmth, ecchymosis, lesion, or atrophy noted.        - ROM:    - Limited AROM in abduction < external rotation.       - Palpation:    - TTP at the lateral shoulder, subacromial space, rotator cuff insertion.   - NTTP elsewhere.        - Strength:  (*antalgic)  - Shoulder Abduction   4*    - Shoulder Flexion   5-*    - Shoulder Internal Rotation  5-*    - Shoulder External Rotation  3+*            - Special tests:        - Santizo: Positive   - Neers: Positive   - Empty can: Positive for pain and weakness    - Clymer:  Neg    - Scarf:  Neg    - Speeds:  Neg    - Yergason:  Neg    - Apprehension/Relocation:  Neg       Radiology  I independently reviewed the available relevant imaging in the chart with  my interpretations as above in HPI.   - XR B/L shoulders 4/19/2024 shows normal anatomic alignment without acute fracture or dislocation.  No significant degenerative changes.    I independently reviewed today's new relevant imaging, with the following interpretation:  - MR L shoulder does show new complete tears of the supraspinatus and infraspinatus at the footprint with retraction.      Assessment  1. Traumatic complete tear of left rotator cuff, initial encounter    2. Tendinopathy of rotator cuff, right        Plan  Mary Lopez is a pleasant 53 year old male that presents with chronic bilateral shoulder pain.  Right shoulder pain started after a fall directly onto the shoulder in October 2023.  He has had anterior shoulder pain since that time that will occasionally radiate down the arm.  He feels antalgic weakness in the shoulder and has been using it less and less due to the pain.  During that time, he has been overcompensating with the left shoulder and started to feel very similar symptoms on the left.  On exam there is positive impingement signs bilaterally with enough antalgic weakness to be concern for the possibility of rotator cuff tear, as well as the possibility of a more traumatic injury on the right.  He and wife are highly in favor of advanced imaging workup at this time and establishing a good short and long-term treatment plan for the shoulders while he is recovering from recent liver transplantation.     We discussed the nature of the condition and available treatment options, and mutually agreed upon the following plan:    - Imaging:          - Reviewed and independently interpreted the relevant imaging in the chart, including any imaging ordered for today's clinic.  - Reviewed results and images with patient.   -Will be important to establish the integrity of the rotator cuff complex with advanced imaging, MRI order has been placed for right and left shoulders and instructions  given for scheduling the MRI and follow-up with me afterward.  - Medications:          - Discussed pharmacologic options for pain relief.   - May use NSAIDs (Ibuprofen, Naproxen) or Acetaminophen (Tylenol) as needed for pain control.   - Do not take these if previously advised to avoid them for other medical conditions.  - May also use topical medications such as lidocaine, IcyHot, BioFreeze, or Voltaren gel as needed for pain control.    - Voltaren gel is an anti-inflammatory cream that may be used up to 4 times per day over the painful area.   - Injections:          - Discussed possible injection options and alternatives.    - Injection options include: Possibility for intra-articular glenohumeral joint injection, subacromial/subdeltoid bursa injection with corticosteroid if okay with transplant team.    - Deferred injections today and will consider them in the future as needed.   - Therapy:          - Discussed the benefits of therapy vs home exercise program for optimization of range of motion, flexibility, strength, stability and function.   - Preference is for therapy.   -Physical therapy referral placed today and instructed to call [phone] to schedule appointments.   - Modalities:          - May use ice, heat, massage or other modalities as needed.  - Surgery:          - Discussed non-operative and operative treatment options for the patient's condition. Goal is to continue conservative care for as long as possible before surgical intervention would need to be considered.  - Activity:          - Encouraged to remain active and participate in regular activities as symptoms allow.   Avoid or modify exacerbating activities as needed.  - Follow up:          - When results of the advanced imaging are available to discuss the results and next steps in treatment.   - May follow up sooner for new/worsening symptoms.  - May contact clinic by phone or MyChart for questions or concerns.     Updated Plan -  5/10/24:  Mary presents to discuss results of his recent MRI which shows rotator cuff tear partial tearing of the supraspinatus and infraspinatus bilaterally, possible labral tears as well.  He is interested in obtaining corticosteroid injections of bilateral subacromial bursa.  Subacromial bursa injections were performed today in clinic without complication, patient tolerated procedure well.  Also placed an order for physical therapy and instructions given for scheduling therapy appointments.  May contact clinic for questions/concerns.      Updated Plan - 4/12/25:  Mary presents today to follow-up for his bilateral shoulder pain secondary to rotator cuff partial tearing bilaterally.  Subacromial bursa corticosteroid injections given at last visit provided 3+ months of great relief of his symptoms.  Over the past few weeks, his pain has seemed to return in similar character as it was before the last injections.  He is interested in repeat injections today in hopes of gaining similar relief as last time.  Ultrasound-guided bilateral subacromial bursae corticosteroid injections were given today in clinic without complication, patient tolerated procedure well.  Follow-up as needed in the future.      Update - 6/20/25:  Mary presents today to discuss a new injury to the left shoulder.  He reports that the bilateral subacromial injections were working well for him until about 45 days ago when he had a new injury while pushing his wife in a wheelchair.  The wheelchair hit a patch of gravel and he felt a strong jolt in the lateral shoulder, possibly a pop.  It was very painful and weak after that time, and the pain has not subsided by this point.  He does feel like it is slightly different than his baseline rotator cuff pain.  On exam, he still is tender at the rotator cuff insertion but the highest concern is he is much weaker in external rotation than his prior exam.  Abduction still about the same.  I have concern  that his infraspinatus tear has progressed and it will be important to evaluate for a complete tear with an updated MRI.  The MRI order has been placed today and instructions given for scheduling the MRI and follow-up with me afterward to discuss the results and next steps.      Update - 7/8/25:  Mary presents today for follow-up of his recent left shoulder MRI due to new worsening pain and weakness in the shoulder and concern for an acute tear.  Unfortunately, the MRI did confirm that he has sustained a new complete tear of the supraspinatus and infraspinatus tendons at the footprint with significant retraction.  We discussed the findings in detail and available treatment options.  After the detailed discussion and his questions were answered, he would prefer to consult with a rotator cuff surgeon to discuss repair options.  Orthopedic  referral has been placed today and he will follow-up soon with Ortho Surg.      Alex Rico DO, CAQSM  [facility] Sports Medicine  [facility] - Department of Orthopedic Surgery       Disclaimer:  This note was prepared and written using Dragon Medical dictation software. As a result, there may be errors in the script that have gone undetected. Please consider this when interpreting the information in this note.         Again, thank you for allowing me to participate in the care of your patient.        Sincerely,        Alex Rico DO    Electronically signed

## 2025-07-08 NOTE — PATIENT INSTRUCTIONS
MRI Scheduling Instructions  Please follow both steps below    1.  Advanced imaging is done by appointment. Please call [facility] ([facility]) [phone] to schedule your MRI at your earliest convenience.   - Some insurance companies may require a prior authorization to be completed which can delay the time until you are able to schedule your appointment.     - If you are active on MyChart, you may have access to your test results before your provider is able to review the study and advise on next steps.      2. After the date of your MRI has been scheduled, please call [phone] to get on my schedule for an in-person or telephone follow up appointment to discuss the results and updated treatment recommendations. This follow up should be scheduled for 1-2 days after the date of your MRI.

## 2025-07-13 ENCOUNTER — LAB (OUTPATIENT)
Dept: LAB | Facility: CLINIC | Age: 55
End: 2025-07-13
Payer: COMMERCIAL

## 2025-07-13 DIAGNOSIS — K86.9 DIABETES MELLITUS ASSOCIATED WITH PANCREATIC DISEASE (H): Primary | ICD-10-CM

## 2025-07-13 DIAGNOSIS — E11.69 DIABETES MELLITUS ASSOCIATED WITH PANCREATIC DISEASE (H): Primary | ICD-10-CM

## 2025-07-14 NOTE — TELEPHONE ENCOUNTER
Form completed and returned to MA basket in [place] for scan/fax.    LESA Mendez, JOHN, FNP-C   [facility] - Pain Management

## 2025-07-14 NOTE — TELEPHONE ENCOUNTER
Incoming call from [facility] to the endoscopy department. Caller calling with further questions. While writer attempted to transfer patient to pain clinic, call was dropped. Writer was unable to retrieve additional information other than the phone number call came from [phone].     Please assist in contacting [employer] per their request. Thank you. Writer routing to medical assistant below as writer does not have [facility] pain clinic. Thank you.       Ada Stephenson, RN  Endoscopy Procedure Pre Assessment  [phone] option 3

## 2025-07-15 ENCOUNTER — LAB (OUTPATIENT)
Dept: LAB | Facility: CLINIC | Age: 55
End: 2025-07-15
Payer: COMMERCIAL

## 2025-07-15 DIAGNOSIS — D70.9 NEUTROPENIA: ICD-10-CM

## 2025-07-15 DIAGNOSIS — E11.69 DIABETES MELLITUS ASSOCIATED WITH PANCREATIC DISEASE (H): ICD-10-CM

## 2025-07-15 DIAGNOSIS — Z94.4 LIVER REPLACED BY TRANSPLANT (H): ICD-10-CM

## 2025-07-15 DIAGNOSIS — K70.11 ALCOHOLIC HEPATITIS WITH ASCITES (H): ICD-10-CM

## 2025-07-15 DIAGNOSIS — K86.9 DIABETES MELLITUS ASSOCIATED WITH PANCREATIC DISEASE (H): ICD-10-CM

## 2025-07-15 LAB
ALBUMIN SERPL BCG-MCNC: 3.8 G/DL (ref 3.5–5.2)
ALP SERPL-CCNC: 190 U/L (ref 40–150)
ALT SERPL W P-5'-P-CCNC: 57 U/L (ref 0–70)
ANION GAP SERPL CALCULATED.3IONS-SCNC: 9 MMOL/L (ref 7–15)
AST SERPL W P-5'-P-CCNC: 53 U/L (ref 0–45)
BASOPHILS # BLD AUTO: 0.1 10E3/UL (ref 0–0.2)
BASOPHILS NFR BLD AUTO: 1 %
BILIRUB SERPL-MCNC: 0.4 MG/DL
BILIRUBIN DIRECT (ROCHE PRO & PURE): 0.16 MG/DL (ref 0–0.45)
BUN SERPL-MCNC: 18.7 MG/DL (ref 6–20)
CALCIUM SERPL-MCNC: 9.4 MG/DL (ref 8.8–10.4)
CHLORIDE SERPL-SCNC: 107 MMOL/L (ref 98–107)
CREAT SERPL-MCNC: 1.54 MG/DL (ref 0.67–1.17)
CYCLOSPORINE BLD LC/MS/MS-MCNC: 122 UG/L (ref 50–400)
EGFRCR SERPLBLD CKD-EPI 2021: 54 ML/MIN/1.73M2
EOSINOPHIL # BLD AUTO: 1 10E3/UL (ref 0–0.7)
EOSINOPHIL NFR BLD AUTO: 17 %
ERYTHROCYTE [DISTWIDTH] IN BLOOD BY AUTOMATED COUNT: 12.9 % (ref 10–15)
EST. AVERAGE GLUCOSE BLD GHB EST-MCNC: 174 MG/DL
GLUCOSE SERPL-MCNC: 71 MG/DL (ref 70–99)
HBA1C MFR BLD: 7.7 % (ref 0–5.6)
HCO3 SERPL-SCNC: 24 MMOL/L (ref 22–29)
HCT VFR BLD AUTO: 38.1 % (ref 40–53)
HGB BLD-MCNC: 12.5 G/DL (ref 13.3–17.7)
IMM GRANULOCYTES # BLD: 0 10E3/UL
IMM GRANULOCYTES NFR BLD: 0 %
LYMPHOCYTES # BLD AUTO: 1.7 10E3/UL (ref 0.8–5.3)
LYMPHOCYTES NFR BLD AUTO: 30 %
MAGNESIUM SERPL-MCNC: 1.8 MG/DL (ref 1.7–2.3)
MCH RBC QN AUTO: 28.7 PG (ref 26.5–33)
MCHC RBC AUTO-ENTMCNC: 32.8 G/DL (ref 31.5–36.5)
MCV RBC AUTO: 87 FL (ref 78–100)
MCV RBC AUTO: 87 FL (ref 78–100)
MONOCYTES # BLD AUTO: 0.4 10E3/UL (ref 0–1.3)
MONOCYTES NFR BLD AUTO: 8 %
NEUTROPHILS # BLD AUTO: 2.6 10E3/UL (ref 1.6–8.3)
NEUTROPHILS NFR BLD AUTO: 44 %
PHOSPHATE SERPL-MCNC: 3.7 MG/DL (ref 2.5–4.5)
PLATELET # BLD AUTO: 173 10E3/UL (ref 150–450)
POTASSIUM SERPL-SCNC: 5.2 MMOL/L (ref 3.4–5.3)
PROT SERPL-MCNC: 6.7 G/DL (ref 6.4–8.3)
RBC # BLD AUTO: 4.36 10E6/UL (ref 4.4–5.9)
SODIUM SERPL-SCNC: 140 MMOL/L (ref 135–145)
TME LAST DOSE: NORMAL H
TME LAST DOSE: NORMAL H
WBC # BLD AUTO: 5.8 10E3/UL (ref 4–11)
WBC # BLD AUTO: 5.8 10E3/UL (ref 4–11)

## 2025-07-15 PROCEDURE — 80053 COMPREHEN METABOLIC PANEL: CPT

## 2025-07-15 PROCEDURE — 36415 COLL VENOUS BLD VENIPUNCTURE: CPT

## 2025-07-15 PROCEDURE — 83735 ASSAY OF MAGNESIUM: CPT

## 2025-07-15 PROCEDURE — G0480 DRUG TEST DEF 1-7 CLASSES: HCPCS | Mod: 90

## 2025-07-15 PROCEDURE — 82248 BILIRUBIN DIRECT: CPT

## 2025-07-15 PROCEDURE — 80158 DRUG ASSAY CYCLOSPORINE: CPT

## 2025-07-15 PROCEDURE — 85025 COMPLETE CBC W/AUTO DIFF WBC: CPT

## 2025-07-15 PROCEDURE — 83036 HEMOGLOBIN GLYCOSYLATED A1C: CPT

## 2025-07-15 PROCEDURE — 84100 ASSAY OF PHOSPHORUS: CPT

## 2025-07-15 NOTE — TELEPHONE ENCOUNTER
Completed forms faxed back to [facility] @ [phone]. RightFax confirmed. Sent to scanning.

## 2025-07-15 NOTE — TELEPHONE ENCOUNTER
DIAGNOSIS: Traumatic complete tear of left rotator cuff, initial encounter [S46.012A]  - Primary   APPOINTMENT DATE: 09/05/2025   NOTES STATUS DETAILS   OFFICE NOTE from referring provider Internal 07/08/2025 - Alex Rico DO - [facility] Sports Med   OFFICE NOTE from other specialist Internal    INJECTIONS DONE IN RADIOLOGY Internal 04/12/2025, 05/10/2024 - Ultrasound-guided bilateral subacromial bursa corticosteroid injections.    MRI PACS Internal   XRAYS (IMAGES & REPORTS) PACS Internal

## 2025-07-16 ENCOUNTER — TELEPHONE (OUTPATIENT)
Dept: TRANSPLANT | Facility: CLINIC | Age: 55
End: 2025-07-16
Payer: COMMERCIAL

## 2025-07-16 DIAGNOSIS — Z94.4 LIVER REPLACED BY TRANSPLANT (H): Chronic | ICD-10-CM

## 2025-07-16 DIAGNOSIS — N18.32 CHRONIC KIDNEY DISEASE, STAGE 3B (H): ICD-10-CM

## 2025-07-16 RX ORDER — CYCLOSPORINE 25 MG/1
25 CAPSULE, LIQUID FILLED ORAL EVERY 12 HOURS
Qty: 180 CAPSULE | Refills: 3 | Status: SHIPPED | OUTPATIENT
Start: 2025-07-16

## 2025-07-16 RX ORDER — CYCLOSPORINE 100 MG/1
100 CAPSULE, LIQUID FILLED ORAL EVERY 12 HOURS
Qty: 180 CAPSULE | Refills: 3 | Status: SHIPPED | OUTPATIENT
Start: 2025-07-16

## 2025-07-16 NOTE — TELEPHONE ENCOUNTER
ISSUE:   Cyclosporine level 122 on 7/15, goal , dose 150 mg BID.    PLAN:   Call Patient and confirm this was an accurate 12-hour trough.   Verify Cyclosporine dose 150 mg BID.   Confirm no new medications or or missed doses.   Confirm no new illness / infection / diarrhea.   If accurate trough and accurate dose, decrease Cyclosporine dose to 125 mg BID     Is this more than a 50% increase or decrease in current IS dose: No  If YES, justification: n/a    Repeat labs in 2 weeks.  *If > 50% change in immunosuppression dose, repeat labs in 1 week.     OUTCOME:   Spoke with Patient, they confirm accurate trough level and current dose 150 mg BID.   Patient confirmed dose change to 125 mg BID.  Patient agreed to repeat labs in 2 weeks.   Orders sent to preferred pharmacy for dose change and lab for repeat labs.   Patient voiced understanding of plan.     Evelyn Clemens RN   Transplant Coordinator  [phone]

## 2025-07-17 DIAGNOSIS — G89.28 OTHER CHRONIC POSTPROCEDURAL PAIN: ICD-10-CM

## 2025-07-17 DIAGNOSIS — R10.84 ABDOMINAL PAIN, GENERALIZED: ICD-10-CM

## 2025-07-17 RX ORDER — DULOXETIN HYDROCHLORIDE 30 MG/1
30 CAPSULE, DELAYED RELEASE ORAL DAILY
Qty: 90 CAPSULE | Refills: 0 | Status: SHIPPED | OUTPATIENT
Start: 2025-07-17

## 2025-07-17 NOTE — TELEPHONE ENCOUNTER
Received fax from pharmacy requesting refill(s) for     DULoxetine (CYMBALTA) 30 MG capsule    Date last filled:  6/18/2025    Last Appt Date:  6/6/2025    Next Appt scheduled:  9/26/2025    Pharmacy:     [id number] - [place], [place] - [address]   Will route for processing    Erika Patel [facility]

## 2025-07-17 NOTE — TELEPHONE ENCOUNTER
7/17/2025 2:02 PM     REFILL REQUEST:     This is a patient of mine. PDMP and Chart have been reviewed; refill request is appropriate.    Refilled for 90 day supply.  Script e-Prescribed to pharmacy    LESA Mendez, JOHN, FNP-C

## 2025-07-18 ENCOUNTER — ANCILLARY PROCEDURE (OUTPATIENT)
Dept: MRI IMAGING | Facility: CLINIC | Age: 55
End: 2025-07-18
Attending: STUDENT IN AN ORGANIZED HEALTH CARE EDUCATION/TRAINING PROGRAM
Payer: COMMERCIAL

## 2025-07-18 DIAGNOSIS — M67.911 TENDINOPATHY OF ROTATOR CUFF, RIGHT: ICD-10-CM

## 2025-07-18 PROCEDURE — 73221 MRI JOINT UPR EXTREM W/O DYE: CPT | Mod: RT | Performed by: RADIOLOGY

## 2025-07-18 NOTE — PROGRESS NOTES
Outcome for 07/18/25 3:21 PM: Data uploaded on Dexcom  Feli Hannon MA  Outcome for 07/29/25 9:54 AM: Data obtained via Dexcom website  Feli Hannon MA

## 2025-07-21 NOTE — PROGRESS NOTES
Mary is a 54 year old who is being evaluated via a billable telephone visit.    What phone number would you like to be contacted at? [phone]  How would you like to obtain your AVS? [place]hart  Originating Location (pt. Location): Home    Distant Location (provider location):  On-site  Telephone visit completed due to the patient did not consent to a video visit.  Phone call duration: 10 minutes  Total Time:  30 minutes were spent on the date of the encounter doing chart review, history, imaging interpretation, MDM, counseling, and documentation and further activities per the note.      Mary Lopez  :  1970  DOS: 2025  MRN: [id number]  PCP: Jimmie Hoyt    Sports Medicine Clinic Visit    Interim History - 2025  - Last seen on 2025 for right shoulder injury.     - Right shoulder MRI shows   Rotator cuff findings have not substantially changed compared to MR  5/3/2024:  *  Supraspinatus tendinosis with low-moderate grade intrasubstance  tearing of the anterior-mid fibers at the footprint.  *  Infraspinatus tendinosis with low to moderate grade intrasubstance  tearing of the anterior fibers at the footprint.  *  Subscapularis tendinosis.     - Since the last visit, no significant change in symptoms in the right shoulder.  Presents today by telephone visit to discuss results of his recent MRI and available treatment options.  - No interim injury.       Interim History - 2025  - Last seen on 2025 for a suspected left traumatic tear of the rotator cuff in the setting of known tendinopathy.   - Since the last visit, patient reports no changes, still painful and much weaker.  Patient is here today to discuss MRI results and next steps.   - No interim injury.     - MRI left shoulder 2025 shows:  Impression:  - Complete tears of the supraspinatus and infraspinatus at the footprint  retracted medially to the level of the glenohumeral joint. Mild fatty  infiltration of  "rotator cuff muscle bulk.      Interim History - June 20, 2025  - Last seen on 7/8/2025 for bilateral rotator cuff tendinopathy. Has started to work with pain management since last visit.  - Bilateral subacromial bursa corticosteroid injections completed on 4/12/2025 provided significant relief that continues still for his baseline shoulder pain.      New Injury  - Since the last visit, he notes new left shoulder pain. This pain is in the anterolateral shoulder and has worsening associated weakness.  He notes an inciting injury about 45 days ago. He was pushing his wife in a wheelchair and hit a patch of gravel and feels he really \"wrenched\" on his shoulder at that time. He points to the anterior/lateral aspect as the worst spot of pain.  His pain level has stayed consistent since the event with no change.      - MRI from May 2024 showed moderate grade intrasubstance tearing of the supraspinatus/infraspinatus junction at the footprint, query superior and posterior superior labral tear and possible adhesive capsulitis.        Interim History - April 12, 2025  - Last seen on 7/8/2025 for bilateral rotator cuff tendinopathy.   - Bilateral subacromial corticosteroid injections completed on 4/12/2025 provided good relief for 2.5-3 months.    - Since the last visit, pain has started to be noticeable over the last week or so and worsening.  Similar in character and severity as last time.  Hopeful for repeat injections today for similar relief.   - No interim injury.     Interim History - January 10, 2025  - Last seen on 5/10/2024 for bilateral rotator cuff tendinopathy.   - Bilateral subacromial corticosteroid injections completed on 5/10/2024 provided 6 months of moderate to great relief in pain.    - Since the last visit, he notes a return in bilateral shoulder pain.  Similar in location and character to prior to the last injections.  Takes Tylenol on occasion.  Interested in repeat injections today in hopes of similar " relief as last time.  - No interim injury.       Interim History - May 10, 2024  - Last seen on 4/19/2024 for bilateral shoulder pain.     - 5/3/2024 right shoulder MRI Impression:  1. Multifocal low to moderate grade intrasubstance tear of supraspinatus and infraspinatus. No high-grade rotator cuff tear. No muscle bulk loss.  2. Query posterosuperior labral tear.  3. Moderate nonsimple appearing pleural effusion, increased from comparison CT incompletely assessed. Consider further evaluation with chest CT if clinically indicated.    -5/3/2024 left shoulder MRI Impression:  1. Focal moderate grade intrasubstance tear of the supraspinatus/infraspinous junctional fibers at the footprint. No muscle atrophy.  2. Query superior and posterosuperior labral tear.  3.  Findings may be seen in clinical entity of adhesive capsulitis. Please correlate clinically.    - Since the last visit, he notes no change in bilateral shoulder pain. Taking Tramadol only at night. No pain relieving measures during the day at this time. Hopeful for injections today.  Patient received approval from his transplant team and diabetes/endocrine for corticosteroid injections.  - No interim injury.     Initial Visit: April 19, 2024  ZACKERY Lopez is a 53 year old male who is seen as a self referral presenting with right shoulder pain.    - Mechanism of Injury:    - Fell in 10/2023 on the right shoulder, and has had pain on the anterior aspect that can travel down upper arm intermittently.  Left shoulder is now painful from overuse on the anterior aspect of shoulder. Similar to the right shoulder but not as intense.   - Pertinent history and prior evaluations:    - XR R shoulder 12/22/2023 shows normal anatomic alignment without significant degenerative changes, no acute fractures or dislocations.  - Noted single left rib fracture on 09/2023  - Doing physical therapy for the right shoulder.   - Liver transplant patient (3/4/2024)  secondary to alcoholic cirrhosis, with residual memory problems since surgery. Also with T2DM on long and short acting insulin, currently with large uncontrolled fluctuations in glucoses throughout the day, last HbA1c 5.6 two weeks ago.     - Pain Character:    - Location:  Bilateral shoulder  - Character:  sharp  - Duration:  for last 6 months  - Course:  steady  - Endorses:    - weakness due to pain, decreased ROM/flexibility, decreased joint mobility, decreased strength and impaired posture making it difficult to do ADL, work tasks, recreational activities  - Denies:    - clicking/popping, grinding, mechanical locking symptoms, instability, numbness, tingling  - Alleviating factors:    - rest, activity modifications, tylenol, muscle relaxers, lidocaine patches, Voltaren gel  - Aggravating factors:    - lifting heavy objects, overhead activities, handgrip activities  - Other treatments tried:    - tylenol, lidocaine patches, Voltaren gel, flexeril    - Patient Goals:    - understand the cause of the symptoms, be able to manage the symptoms successfully  - Social History:   - On disability. Previous work:        Review of Systems  Musculoskeletal: as above  Remainder of review of systems is negative including constitutional, CV, pulmonary, GI, Skin and Neurologic except as noted in HPI or medical history.    Past Medical History:   Diagnosis Date    ANGEL (acute kidney injury) 06/27/2023    Alcoholic cirrhosis of liver without ascites (H) 07/13/2023    Alcoholic hepatitis with ascites (H) 10/03/2023    Alcoholic hepatitis without ascites (H) 07/13/2023    Chronic generalized pain disorder 12/25/2024    Chronic groin pain, right 05/20/2025    CKD stage 3a, GFR 45-59 ml/min (H) 08/08/2024    Closed fracture of one rib of left side 09/14/2023    Concussion without loss of consciousness 03/11/2020    Decompensated hepatic cirrhosis (H) 09/15/2023    Diabetes mellitus, type 2 (H) 11/22/2023    Essential  hypertension 03/11/2020    Ganglion cyst of wrist, right 04/18/2024    History of biliary duct stent placement 09/05/2024    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)     Liver replaced by transplant (H) 03/05/2024    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    ACR PRAJAPATI 6-7/9, treated with steroids    Mild hyperlipidemia 12/07/2021    Persistent insomnia 07/13/2023    Portal hypertension (H) 07/13/2023    Right inguinal hernia 08/08/2024    Scrotal abscess 06/27/2023    Secondary esophageal 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 (H) 07/13/2023    Tobacco abuse disorder 03/11/2020    Type 2 diabetes mellitus with hyperglycemia (H) 12/22/2023     Past Surgical History:   Procedure Laterality Date    BENCH LIVER  03/05/2024    Procedure: Bench liver;  Surgeon: Ryder Coretz MD;  Location:  OR    CHOLECYSTECTOMY      done at [facility]    COLONOSCOPY N/A 01/02/2024    Procedure: COLONOSCOPY, WITH POLYPECTOMY;  Surgeon: Jak Urbina MD;  Location: [facility]    CV RIGHT HEART CATH MEASUREMENTS RECORDED N/A 01/30/2024    Procedure: Right Heart Catheterization;  Surgeon: Alfred Tafoya MD;  Location:  HEART CARDIAC CATH LAB    ENDOSCOPIC RETROGRADE CHOLANGIOPANCREATOGRAM N/A 10/22/2024    Procedure: ENDOSCOPIC RETROGRADE CHOLANGIOPANCREATOGRAPHY, WITH bile duct stent stent exchange and balloon dilation;  Surgeon: Naldo Rodriguez MD;  Location:  OR    ENDOSCOPIC RETROGRADE CHOLANGIOPANCREATOGRAM N/A 12/17/2024    Procedure: ENDOSCOPIC RETROGRADE CHOLANGIOPANCREATOGRAPHY BILLIARY DILATION AND STENT PLACEMENT;  Surgeon: Will Martínez MD;  Location:  OR    ENDOSCOPIC RETROGRADE CHOLANGIOPANCREATOGRAPHY, EXCHANGE TUBE/STENT N/A 08/13/2024    Procedure: Endoscopic Retrograde Cholangiopancreatography with biliary sphincterotomy, balloon dilation, pancreatic stent placement,biliary stent exchange;  Surgeon: Naldo Rodriguez MD;  Location: [facility]    ENDOSCOPIC RETROGRADE  CHOLANGIOPANCREATOGRAPHY, EXCHANGE TUBE/STENT N/A 2025    Procedure: ENDOSCOPIC RETROGRADE CHOLANGIOPANCREATOGRAPHY, WITH sludge removal, and stent removal;  Surgeon: Will Martínez MD;  Location: [facility]    ENTEROSCOPY SMALL BOWEL N/A 2024    Procedure: Enteroscopy small bowel;  Surgeon: Hugo Daigle MD;  [facility]    ESOPHAGOSCOPY, GASTROSCOPY, DUODENOSCOPY (EGD), COMBINED N/A 2024    Procedure: Esophagoscopy, gastroscopy, duodenoscopy (EGD), combined;  Surgeon: Hugo Daigle MD;  Location: [facility]    HERNIORRHAPHY INGUINAL Right 2024    Procedure: Open Inguinal Hernia Repair;  Surgeon: Ryder Cortez MD;  Location: [facility]    IR LIVER BIOPSY PERCUTANEOUS  03/15/2024    IR RETROPERITONEAL ABSCESS DRAINAGE  2024    TONSILLECTOMY      TRANSPLANT LIVER RECIPIENT  DONOR N/A 2024    Procedure: Transplant liver recipient  donor, bile duct stent placement;  Surgeon: Ryder Cortez MD;  Location: [facility]    VASECTOMY       Family History   Problem Relation Age of Onset    Anxiety Disorder Mother     Depression Mother     Bipolar Disorder Mother     Chronic Obstructive Pulmonary Disease Mother     Lung Cancer Mother 81    Morbid Obesity Father     Diabetes Father     Diabetes Type 2  Brother     Substance Abuse Maternal Grandfather     Substance Abuse Paternal Grandfather     Colon Cancer No family hx of     Liver Disease No family hx of          Objective  No physical exam due to the nature of the telephone visit.      Radiology  I independently reviewed the available relevant imaging in the chart with my interpretations as above in HPI.   - XR B/L shoulders 2024 shows normal anatomic alignment without acute fracture or dislocation.  No significant degenerative changes.  - MR L shoulder does show new complete tears of the supraspinatus and infraspinatus at the footprint with retraction.    I independently reviewed today's new  relevant imaging, with the following interpretation:  - MR R shoulder shows no significant change in findings from previous MRI.  Still shows tendinopathy of multiple rotator cuff tendons with low-grade intrasubstance tearing in the supraspinatus and infraspinatus.      Assessment  1. Tendinopathy of rotator cuff, right          Plan  Mary Lopez is a pleasant 53 year old male that presents with chronic bilateral shoulder pain.  Right shoulder pain started after a fall directly onto the shoulder in October 2023.  He has had anterior shoulder pain since that time that will occasionally radiate down the arm.  He feels antalgic weakness in the shoulder and has been using it less and less due to the pain.  During that time, he has been overcompensating with the left shoulder and started to feel very similar symptoms on the left.  On exam there is positive impingement signs bilaterally with enough antalgic weakness to be concern for the possibility of rotator cuff tear, as well as the possibility of a more traumatic injury on the right.  He and wife are highly in favor of advanced imaging workup at this time and establishing a good short and long-term treatment plan for the shoulders while he is recovering from recent liver transplantation.     We discussed the nature of the condition and available treatment options, and mutually agreed upon the following plan:    - Imaging:          - Reviewed and independently interpreted the relevant imaging in the chart, including any imaging ordered for today's clinic.  - Reviewed results and images with patient.   -Will be important to establish the integrity of the rotator cuff complex with advanced imaging, MRI order has been placed for right and left shoulders and instructions given for scheduling the MRI and follow-up with me afterward.  - Medications:          - Discussed pharmacologic options for pain relief.   - May use NSAIDs (Ibuprofen, Naproxen) or  Acetaminophen (Tylenol) as needed for pain control.   - Do not take these if previously advised to avoid them for other medical conditions.  - May also use topical medications such as lidocaine, IcyHot, BioFreeze, or Voltaren gel as needed for pain control.    - Voltaren gel is an anti-inflammatory cream that may be used up to 4 times per day over the painful area.   - Injections:          - Discussed possible injection options and alternatives.    - Injection options include: Possibility for intra-articular glenohumeral joint injection, subacromial/subdeltoid bursa injection with corticosteroid if okay with transplant team.    - Deferred injections today and will consider them in the future as needed.   - Therapy:          - Discussed the benefits of therapy vs home exercise program for optimization of range of motion, flexibility, strength, stability and function.   - Preference is for therapy.   -Physical therapy referral placed today and instructed to call [phone] to schedule appointments.   - Modalities:          - May use ice, heat, massage or other modalities as needed.  - Surgery:          - Discussed non-operative and operative treatment options for the patient's condition. Goal is to continue conservative care for as long as possible before surgical intervention would need to be considered.  - Activity:          - Encouraged to remain active and participate in regular activities as symptoms allow.   Avoid or modify exacerbating activities as needed.  - Follow up:          - When results of the advanced imaging are available to discuss the results and next steps in treatment.   - May follow up sooner for new/worsening symptoms.  - May contact clinic by phone or MyChart for questions or concerns.     Updated Plan - 5/10/24:  Mary presents to discuss results of his recent MRI which shows rotator cuff tear partial tearing of the supraspinatus and infraspinatus bilaterally, possible labral tears as well.   He is interested in obtaining corticosteroid injections of bilateral subacromial bursa.  Subacromial bursa injections were performed today in clinic without complication, patient tolerated procedure well.  Also placed an order for physical therapy and instructions given for scheduling therapy appointments.  May contact clinic for questions/concerns.      Updated Plan - 4/12/25:  Mary presents today to follow-up for his bilateral shoulder pain secondary to rotator cuff partial tearing bilaterally.  Subacromial bursa corticosteroid injections given at last visit provided 3+ months of great relief of his symptoms.  Over the past few weeks, his pain has seemed to return in similar character as it was before the last injections.  He is interested in repeat injections today in hopes of gaining similar relief as last time.  Ultrasound-guided bilateral subacromial bursae corticosteroid injections were given today in clinic without complication, patient tolerated procedure well.  Follow-up as needed in the future.      Update - 6/20/25:  Mary presents today to discuss a new injury to the left shoulder.  He reports that the bilateral subacromial injections were working well for him until about 45 days ago when he had a new injury while pushing his wife in a wheelchair.  The wheelchair hit a patch of gravel and he felt a strong jolt in the lateral shoulder, possibly a pop.  It was very painful and weak after that time, and the pain has not subsided by this point.  He does feel like it is slightly different than his baseline rotator cuff pain.  On exam, he still is tender at the rotator cuff insertion but the highest concern is he is much weaker in external rotation than his prior exam.  Abduction still about the same.  I have concern that his infraspinatus tear has progressed and it will be important to evaluate for a complete tear with an updated MRI.  The MRI order has been placed today and instructions given for  scheduling the MRI and follow-up with me afterward to discuss the results and next steps.      Update - 7/8/25:  Mary presents today for follow-up of his recent left shoulder MRI due to new worsening pain and weakness in the shoulder and concern for an acute tear.  Unfortunately, the MRI did confirm that he has sustained a new complete tear of the supraspinatus and infraspinatus tendons at the footprint with significant retraction.  We discussed the findings in detail and available treatment options.  After the detailed discussion and his questions were answered, he would prefer to consult with a rotator cuff surgeon to discuss repair options.  Orthopedic  referral has been placed today and he will follow-up soon with Ortho Surg.      Update - 7/22/25:  Mary presents today via telephone visit for discussion of his recent MRI results for the right shoulder with concern for a new traumatic rotator cuff tear.  Fortunately, the MRI shows no substantial change from his previous MRI in 2024 with persistent tendinopathy and low-grade intrasubstance tearing of the supraspinatus and infraspinatus and subscapularis.  We discussed available treatment options and he would like to proceed with another ultrasound-guided subacromial bursa injection on the right side in the next few weeks, planning to coordinate it with timing of his upcoming left shoulder surgery to help with postoperative recovery as well.  He will call to schedule an appointment at the best time for him, recommend a few weeks before his surgery.      Alex Rico DO, CAQSM  [facility]  [facility] - Department of Orthopedic Surgery       Disclaimer:  This note was prepared and written using Dragon Medical dictation software. As a result, there may be errors in the script that have gone undetected. Please consider this when interpreting the information in this note.

## 2025-07-22 ENCOUNTER — VIRTUAL VISIT (OUTPATIENT)
Dept: ORTHOPEDICS | Facility: CLINIC | Age: 55
End: 2025-07-22
Payer: COMMERCIAL

## 2025-07-22 DIAGNOSIS — M67.911 TENDINOPATHY OF ROTATOR CUFF, RIGHT: Primary | ICD-10-CM

## 2025-07-22 PROCEDURE — 98014 SYNCH AUDIO-ONLY EST MOD 30: CPT | Performed by: STUDENT IN AN ORGANIZED HEALTH CARE EDUCATION/TRAINING PROGRAM

## 2025-07-22 NOTE — LETTER
2025      Mary Lopez  [address] [place] [address]      Dear Colleague,    Thank you for referring your patient, Mary Lopez, to the [facility]. Please see a copy of my visit note below.    Mary is a 54 year old who is being evaluated via a billable telephone visit.    What phone number would you like to be contacted at? [phone]  How would you like to obtain your AVS? MyChart  Originating Location (pt. Location): Home    Distant Location (provider location):  On-site  Telephone visit completed due to the patient did not consent to a video visit.  Phone call duration: 10 minutes  Total Time:  30 minutes were spent on the date of the encounter doing chart review, history, imaging interpretation, MDM, counseling, and documentation and further activities per the note.      Mary Lopez  :  1970  DOS: 2025  MRN: [id number]  PCP: Jimmie Hoyt    Sports Medicine Clinic Visit    Interim History - 2025  - Last seen on 2025 for right shoulder injury.     - Right shoulder MRI shows   Rotator cuff findings have not substantially changed compared to MR  5/3/2024:  *  Supraspinatus tendinosis with low-moderate grade intrasubstance  tearing of the anterior-mid fibers at the footprint.  *  Infraspinatus tendinosis with low to moderate grade intrasubstance  tearing of the anterior fibers at the footprint.  *  Subscapularis tendinosis.     - Since the last visit, no significant change in symptoms in the right shoulder.  Presents today by telephone visit to discuss results of his recent MRI and available treatment options.  - No interim injury.       Interim History - 2025  - Last seen on 2025 for a suspected left traumatic tear of the rotator cuff in the setting of known tendinopathy.   - Since the last visit, patient reports no changes, still painful and much weaker.  Patient is here today to discuss MRI  "results and next steps.   - No interim injury.     - MRI left shoulder 7/7/2025 shows:  Impression:  - Complete tears of the supraspinatus and infraspinatus at the footprint  retracted medially to the level of the glenohumeral joint. Mild fatty  infiltration of rotator cuff muscle bulk.      Interim History - June 20, 2025  - Last seen on 7/8/2025 for bilateral rotator cuff tendinopathy. Has started to work with pain management since last visit.  - Bilateral subacromial bursa corticosteroid injections completed on 4/12/2025 provided significant relief that continues still for his baseline shoulder pain.      New Injury  - Since the last visit, he notes new left shoulder pain. This pain is in the anterolateral shoulder and has worsening associated weakness.  He notes an inciting injury about 45 days ago. He was pushing his wife in a wheelchair and hit a patch of gravel and feels he really \"wrenched\" on his shoulder at that time. He points to the anterior/lateral aspect as the worst spot of pain.  His pain level has stayed consistent since the event with no change.      - MRI from May 2024 showed moderate grade intrasubstance tearing of the supraspinatus/infraspinatus junction at the footprint, query superior and posterior superior labral tear and possible adhesive capsulitis.        Interim History - April 12, 2025  - Last seen on 7/8/2025 for bilateral rotator cuff tendinopathy.   - Bilateral subacromial corticosteroid injections completed on 4/12/2025 provided good relief for 2.5-3 months.    - Since the last visit, pain has started to be noticeable over the last week or so and worsening.  Similar in character and severity as last time.  Hopeful for repeat injections today for similar relief.   - No interim injury.     Interim History - January 10, 2025  - Last seen on 5/10/2024 for bilateral rotator cuff tendinopathy.   - Bilateral subacromial corticosteroid injections completed on 5/10/2024 provided 6 months of " moderate to great relief in pain.    - Since the last visit, he notes a return in bilateral shoulder pain.  Similar in location and character to prior to the last injections.  Takes Tylenol on occasion.  Interested in repeat injections today in hopes of similar relief as last time.  - No interim injury.       Interim History - May 10, 2024  - Last seen on 4/19/2024 for bilateral shoulder pain.     - 5/3/2024 right shoulder MRI Impression:  1. Multifocal low to moderate grade intrasubstance tear of supraspinatus and infraspinatus. No high-grade rotator cuff tear. No muscle bulk loss.  2. Query posterosuperior labral tear.  3. Moderate nonsimple appearing pleural effusion, increased from comparison CT incompletely assessed. Consider further evaluation with chest CT if clinically indicated.    -5/3/2024 left shoulder MRI Impression:  1. Focal moderate grade intrasubstance tear of the supraspinatus/infraspinous junctional fibers at the footprint. No muscle atrophy.  2. Query superior and posterosuperior labral tear.  3.  Findings may be seen in clinical entity of adhesive capsulitis. Please correlate clinically.    - Since the last visit, he notes no change in bilateral shoulder pain. Taking Tramadol only at night. No pain relieving measures during the day at this time. Hopeful for injections today.  Patient received approval from his transplant team and diabetes/endocrine for corticosteroid injections.  - No interim injury.     Initial Visit: April 19, 2024  ZACKERY Lopez is a 53 year old male who is seen as a self referral presenting with right shoulder pain.    - Mechanism of Injury:    - Fell in 10/2023 on the right shoulder, and has had pain on the anterior aspect that can travel down upper arm intermittently.  Left shoulder is now painful from overuse on the anterior aspect of shoulder. Similar to the right shoulder but not as intense.   - Pertinent history and prior evaluations:    - XR R shoulder  12/22/2023 shows normal anatomic alignment without significant degenerative changes, no acute fractures or dislocations.  - Noted single left rib fracture on 09/2023  - Doing physical therapy for the right shoulder.   - Liver transplant patient (3/4/2024) secondary to alcoholic cirrhosis, with residual memory problems since surgery. Also with T2DM on long and short acting insulin, currently with large uncontrolled fluctuations in glucoses throughout the day, last HbA1c 5.6 two weeks ago.     - Pain Character:    - Location:  Bilateral shoulder  - Character:  sharp  - Duration:  for last 6 months  - Course:  steady  - Endorses:    - weakness due to pain, decreased ROM/flexibility, decreased joint mobility, decreased strength and impaired posture making it difficult to do ADL, work tasks, recreational activities  - Denies:    - clicking/popping, grinding, mechanical locking symptoms, instability, numbness, tingling  - Alleviating factors:    - rest, activity modifications, tylenol, muscle relaxers, lidocaine patches, Voltaren gel  - Aggravating factors:    - lifting heavy objects, overhead activities, handgrip activities  - Other treatments tried:    - tylenol, lidocaine patches, Voltaren gel, flexeril    - Patient Goals:    - understand the cause of the symptoms, be able to manage the symptoms successfully  - Social History:   - On disability. Previous work:        Review of Systems  Musculoskeletal: as above  Remainder of review of systems is negative including constitutional, CV, pulmonary, GI, Skin and Neurologic except as noted in HPI or medical history.    Past Medical History:   Diagnosis Date     ANGEL (acute kidney injury) 06/27/2023     Alcoholic cirrhosis of liver without ascites (H) 07/13/2023     Alcoholic hepatitis with ascites (H) 10/03/2023     Alcoholic hepatitis without ascites (H) 07/13/2023     Chronic generalized pain disorder 12/25/2024     Chronic groin pain, right 05/20/2025      CKD stage 3a, GFR 45-59 ml/min (H) 08/08/2024     Closed fracture of one rib of left side 09/14/2023     Concussion without loss of consciousness 03/11/2020     Decompensated hepatic cirrhosis (H) 09/15/2023     Diabetes mellitus, type 2 (H) 11/22/2023     Essential hypertension 03/11/2020     Ganglion cyst of wrist, right 04/18/2024     History of biliary duct stent placement 09/05/2024     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)      Liver replaced by transplant (H) 03/05/2024     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    ACR PRAJAPATI 6-7/9, treated with steroids     Mild hyperlipidemia 12/07/2021     Persistent insomnia 07/13/2023     Portal hypertension (H) 07/13/2023     Right inguinal hernia 08/08/2024     Scrotal abscess 06/27/2023     Secondary esophageal 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 (H) 07/13/2023     Tobacco abuse disorder 03/11/2020     Type 2 diabetes mellitus with hyperglycemia (H) 12/22/2023     Past Surgical History:   Procedure Laterality Date     BENCH LIVER  03/05/2024    Procedure: Bench liver;  Surgeon: Ryder Cortez MD;  Location:  OR     CHOLECYSTECTOMY      done at [facility]     COLONOSCOPY N/A 01/02/2024    Procedure: COLONOSCOPY, WITH POLYPECTOMY;  Surgeon: Jak Urbina MD;  Location:  GI     CV RIGHT HEART CATH MEASUREMENTS RECORDED N/A 01/30/2024    Procedure: Right Heart Catheterization;  Surgeon: Alfred Tafoya MD;  Location:  HEART CARDIAC CATH LAB     ENDOSCOPIC RETROGRADE CHOLANGIOPANCREATOGRAM N/A 10/22/2024    Procedure: ENDOSCOPIC RETROGRADE CHOLANGIOPANCREATOGRAPHY, WITH bile duct stent stent exchange and balloon dilation;  Surgeon: Naldo Rodriguez MD;  Location:  OR     ENDOSCOPIC RETROGRADE CHOLANGIOPANCREATOGRAM N/A 12/17/2024    Procedure: ENDOSCOPIC RETROGRADE CHOLANGIOPANCREATOGRAPHY BILLIARY DILATION AND STENT PLACEMENT;  Surgeon: Will Martínez MD;  Location:  OR     ENDOSCOPIC RETROGRADE CHOLANGIOPANCREATOGRAPHY,  EXCHANGE TUBE/STENT N/A 2024    Procedure: Endoscopic Retrograde Cholangiopancreatography with biliary sphincterotomy, balloon dilation, pancreatic stent placement,biliary stent exchange;  Surgeon: Naldo Rodriguez MD;  Location: [facility] OR     ENDOSCOPIC RETROGRADE CHOLANGIOPANCREATOGRAPHY, EXCHANGE TUBE/STENT N/A 2025    Procedure: ENDOSCOPIC RETROGRADE CHOLANGIOPANCREATOGRAPHY, WITH sludge removal, and stent removal;  Surgeon: Will Martínez MD;  Location: [facility] OR     ENTEROSCOPY SMALL BOWEL N/A 2024    Procedure: Enteroscopy small bowel;  Surgeon: Hugo Daigle MD;  Location: [facility] GI     ESOPHAGOSCOPY, GASTROSCOPY, DUODENOSCOPY (EGD), COMBINED N/A 2024    Procedure: Esophagoscopy, gastroscopy, duodenoscopy (EGD), combined;  Surgeon: Hugo Daigle MD;  Location: [facility] GI     HERNIORRHAPHY INGUINAL Right 2024    Procedure: Open Inguinal Hernia Repair;  Surgeon: Ryder Cortez MD;  Location: [facility] OR     IR LIVER BIOPSY PERCUTANEOUS  03/15/2024     IR RETROPERITONEAL ABSCESS DRAINAGE  2024     TONSILLECTOMY       TRANSPLANT LIVER RECIPIENT  DONOR N/A 2024    Procedure: Transplant liver recipient  donor, bile duct stent placement;  Surgeon: Ryder Cortez MD;  Location: [facility] OR     VASECTOMY       Family History   Problem Relation Age of Onset     Anxiety Disorder Mother      Depression Mother      Bipolar Disorder Mother      Chronic Obstructive Pulmonary Disease Mother      Lung Cancer Mother 81     Morbid Obesity Father      Diabetes Father      Diabetes Type 2  Brother      Substance Abuse Maternal Grandfather      Substance Abuse Paternal Grandfather      Colon Cancer No family hx of      Liver Disease No family hx of          Objective  No physical exam due to the nature of the telephone visit.      Radiology  I independently reviewed the available relevant imaging in the chart with my interpretations as above in HPI.   - XR B/L  shoulders 4/19/2024 shows normal anatomic alignment without acute fracture or dislocation.  No significant degenerative changes.  - MR L shoulder does show new complete tears of the supraspinatus and infraspinatus at the footprint with retraction.    I independently reviewed today's new relevant imaging, with the following interpretation:  - MR R shoulder shows no significant change in findings from previous MRI.  Still shows tendinopathy of multiple rotator cuff tendons with low-grade intrasubstance tearing in the supraspinatus and infraspinatus.      Assessment  1. Tendinopathy of rotator cuff, right          Plan  Mary Lopez is a pleasant 53 year old male that presents with chronic bilateral shoulder pain.  Right shoulder pain started after a fall directly onto the shoulder in October 2023.  He has had anterior shoulder pain since that time that will occasionally radiate down the arm.  He feels antalgic weakness in the shoulder and has been using it less and less due to the pain.  During that time, he has been overcompensating with the left shoulder and started to feel very similar symptoms on the left.  On exam there is positive impingement signs bilaterally with enough antalgic weakness to be concern for the possibility of rotator cuff tear, as well as the possibility of a more traumatic injury on the right.  He and wife are highly in favor of advanced imaging workup at this time and establishing a good short and long-term treatment plan for the shoulders while he is recovering from recent liver transplantation.     We discussed the nature of the condition and available treatment options, and mutually agreed upon the following plan:    - Imaging:          - Reviewed and independently interpreted the relevant imaging in the chart, including any imaging ordered for today's clinic.  - Reviewed results and images with patient.   -Will be important to establish the integrity of the rotator cuff complex  with advanced imaging, MRI order has been placed for right and left shoulders and instructions given for scheduling the MRI and follow-up with me afterward.  - Medications:          - Discussed pharmacologic options for pain relief.   - May use NSAIDs (Ibuprofen, Naproxen) or Acetaminophen (Tylenol) as needed for pain control.   - Do not take these if previously advised to avoid them for other medical conditions.  - May also use topical medications such as lidocaine, IcyHot, BioFreeze, or Voltaren gel as needed for pain control.    - Voltaren gel is an anti-inflammatory cream that may be used up to 4 times per day over the painful area.   - Injections:          - Discussed possible injection options and alternatives.    - Injection options include: Possibility for intra-articular glenohumeral joint injection, subacromial/subdeltoid bursa injection with corticosteroid if okay with transplant team.    - Deferred injections today and will consider them in the future as needed.   - Therapy:          - Discussed the benefits of therapy vs home exercise program for optimization of range of motion, flexibility, strength, stability and function.   - Preference is for therapy.   -Physical therapy referral placed today and instructed to call [phone] to schedule appointments.   - Modalities:          - May use ice, heat, massage or other modalities as needed.  - Surgery:          - Discussed non-operative and operative treatment options for the patient's condition. Goal is to continue conservative care for as long as possible before surgical intervention would need to be considered.  - Activity:          - Encouraged to remain active and participate in regular activities as symptoms allow.   Avoid or modify exacerbating activities as needed.  - Follow up:          - When results of the advanced imaging are available to discuss the results and next steps in treatment.   - May follow up sooner for new/worsening symptoms.  -  May contact clinic by phone or MyChart for questions or concerns.     Updated Plan - 5/10/24:  Mary presents to discuss results of his recent MRI which shows rotator cuff tear partial tearing of the supraspinatus and infraspinatus bilaterally, possible labral tears as well.  He is interested in obtaining corticosteroid injections of bilateral subacromial bursa.  Subacromial bursa injections were performed today in clinic without complication, patient tolerated procedure well.  Also placed an order for physical therapy and instructions given for scheduling therapy appointments.  May contact clinic for questions/concerns.      Updated Plan - 4/12/25:  Mary presents today to follow-up for his bilateral shoulder pain secondary to rotator cuff partial tearing bilaterally.  Subacromial bursa corticosteroid injections given at last visit provided 3+ months of great relief of his symptoms.  Over the past few weeks, his pain has seemed to return in similar character as it was before the last injections.  He is interested in repeat injections today in hopes of gaining similar relief as last time.  Ultrasound-guided bilateral subacromial bursae corticosteroid injections were given today in clinic without complication, patient tolerated procedure well.  Follow-up as needed in the future.      Update - 6/20/25:  Mary presents today to discuss a new injury to the left shoulder.  He reports that the bilateral subacromial injections were working well for him until about 45 days ago when he had a new injury while pushing his wife in a wheelchair.  The wheelchair hit a patch of gravel and he felt a strong jolt in the lateral shoulder, possibly a pop.  It was very painful and weak after that time, and the pain has not subsided by this point.  He does feel like it is slightly different than his baseline rotator cuff pain.  On exam, he still is tender at the rotator cuff insertion but the highest concern is he is much weaker in  external rotation than his prior exam.  Abduction still about the same.  I have concern that his infraspinatus tear has progressed and it will be important to evaluate for a complete tear with an updated MRI.  The MRI order has been placed today and instructions given for scheduling the MRI and follow-up with me afterward to discuss the results and next steps.      Update - 7/8/25:  Mary presents today for follow-up of his recent left shoulder MRI due to new worsening pain and weakness in the shoulder and concern for an acute tear.  Unfortunately, the MRI did confirm that he has sustained a new complete tear of the supraspinatus and infraspinatus tendons at the footprint with significant retraction.  We discussed the findings in detail and available treatment options.  After the detailed discussion and his questions were answered, he would prefer to consult with a rotator cuff surgeon to discuss repair options.  Orthopedic  referral has been placed today and he will follow-up soon with Ortho Surg.      Update - 7/22/25:  Mary presents today via telephone visit for discussion of his recent MRI results for the right shoulder with concern for a new traumatic rotator cuff tear.  Fortunately, the MRI shows no substantial change from his previous MRI in 2024 with persistent tendinopathy and low-grade intrasubstance tearing of the supraspinatus and infraspinatus and subscapularis.  We discussed available treatment options and he would like to proceed with another ultrasound-guided subacromial bursa injection on the right side in the next few weeks, planning to coordinate it with timing of his upcoming left shoulder surgery to help with postoperative recovery as well.  He will call to schedule an appointment at the best time for him, recommend a few weeks before his surgery.      Alex Rico DO, CAHILLARYM  [facility]  [facility] - Department of Orthopedic Surgery        Disclaimer:  This note was prepared and written using Dragon Medical dictation software. As a result, there may be errors in the script that have gone undetected. Please consider this when interpreting the information in this note.         Again, thank you for allowing me to participate in the care of your patient.        Sincerely,        Alex Rico, DO    Electronically signed

## 2025-07-31 ENCOUNTER — LAB (OUTPATIENT)
Dept: LAB | Facility: CLINIC | Age: 55
End: 2025-07-31
Payer: COMMERCIAL

## 2025-07-31 ENCOUNTER — VIRTUAL VISIT (OUTPATIENT)
Dept: ENDOCRINOLOGY | Facility: CLINIC | Age: 55
End: 2025-07-31
Payer: COMMERCIAL

## 2025-07-31 DIAGNOSIS — Z79.4 TYPE 2 DIABETES MELLITUS WITH HYPERGLYCEMIA, WITH LONG-TERM CURRENT USE OF INSULIN (H): Primary | ICD-10-CM

## 2025-07-31 DIAGNOSIS — Z94.4 LIVER REPLACED BY TRANSPLANT (H): Chronic | ICD-10-CM

## 2025-07-31 DIAGNOSIS — K70.11 ALCOHOLIC HEPATITIS WITH ASCITES (H): Chronic | ICD-10-CM

## 2025-07-31 DIAGNOSIS — D70.9 NEUTROPENIA: ICD-10-CM

## 2025-07-31 DIAGNOSIS — E11.65 TYPE 2 DIABETES MELLITUS WITH HYPERGLYCEMIA, WITH LONG-TERM CURRENT USE OF INSULIN (H): Primary | ICD-10-CM

## 2025-07-31 LAB
ALBUMIN SERPL BCG-MCNC: 3.9 G/DL (ref 3.5–5.2)
ALP SERPL-CCNC: 177 U/L (ref 40–150)
ALT SERPL W P-5'-P-CCNC: 30 U/L (ref 0–70)
AST SERPL W P-5'-P-CCNC: 44 U/L (ref 0–45)
BILIRUB SERPL-MCNC: 0.3 MG/DL
BILIRUBIN DIRECT (ROCHE PRO & PURE): <0.08 MG/DL (ref 0–0.45)
CYCLOSPORINE BLD LC/MS/MS-MCNC: 33 UG/L (ref 50–400)
PROT SERPL-MCNC: 7 G/DL (ref 6.4–8.3)
TME LAST DOSE: ABNORMAL H
TME LAST DOSE: ABNORMAL H

## 2025-07-31 RX ORDER — INSULIN DEGLUDEC 100 U/ML
INJECTION, SOLUTION SUBCUTANEOUS
Qty: 15 ML | Refills: 11 | Status: SHIPPED | OUTPATIENT
Start: 2025-07-31

## 2025-07-31 NOTE — NURSING NOTE
Current patient location: [place]    Is the patient currently in the state of [place]? YES    Visit mode: VIDEO    If the visit is dropped, the patient can be reconnected by:VIDEO VISIT: Text to cell phone:   Telephone Information:   Mobile [phone]   Mobile [phone]       Will anyone else be joining the visit? NO  (If patient encounters technical issues they should call [phone]    Are changes needed to the allergy or medication list? No    Are refills needed on medications prescribed by this physician? NO    Rooming Documentation:  Questionnaire(s) completed    Reason for visit: RECHECK    Alena CHRISTY

## 2025-07-31 NOTE — PATIENT INSTRUCTIONS
It was good to see you in [place] this morning via video visit.  Decrease Tresiba 22 units daily.  No change in Lyumjev insulin doses today.  Continue Jardiance 25 mg each a.m.  Please be sure to stay well-hydrated while taking Jardiance.  I have asked Liana Garcia RD/JESUSE to follow-up with you in 2 weeks to review your blood sugar data.  Appointment with me in 3 months.  I placed an order for another A1C which can be done anytime after 10/15/2025.  I also placed a referral to ophthalmology for diabetic eye exam.  Take care,  Gevoanna Ferreira PA-C

## 2025-07-31 NOTE — PROGRESS NOTES
Time of start: 8:40 AM  Time of end: 8:56 AM  Total duration of video visit: 16 minutes.  Providers location: Off-site.  Patient's location: [place].    HPI  Mary Lopez is a 54 year-old male with history of type 2 diabetes mellitus.  Video visit today for diabetes follow-up.  His wife Rosio is present during our video visit today.  Patient last seen by me in September 2024.  Pt has been seen by Liana Garcia RD/JARROD.  Patient was admitted 3/4/2024-3/21/2024 and underwent liver transplant on 3/5/2024.  History of [id number] and alcohol use disorder in remission.  For his diabetes, patient is currently taking Tresiba 24 units subcutaneous each am and  Lyumjev 6 units with meals.  He is not using correction insulin.  Patient is also taking Jardiance 25 mg each am.  Mary did not like the Cequr insulin patch.    Most recent A1C was 7.7 % on 7/15/2025.  Previous A1C was 8.3 % in April 2025.    I reviewed and scanned his DexcomG7 sensor download data in his note below.  Average glucose 203 with an estimate A1C 8.2 % for the past 14 days.    Blood sugar in target range 45 % of the time and below target range 4 % of the time.  Some overnight hypoglycemia.  Patient also has a tendency to snack late at night and will have some high blood sugars when he goes to bed.    Patient denies any severe hypoglycemia.  On ROS today, patient on permanent disability.  He states he does stay active around the house.  Some blurred vision.  Stools have been loose and oily.  Denies abdominal pain, blood in stool or melena.  Patient denies nausea, vomiting, shortness of breath at rest, cough or fever.  No chest pain, dysuria or hematuria.  Mary denies any symptoms of diabetic peripheral neuropathy or foot ulcers.    Diabetes Care  Retinopathy: None per patient.  Ophthalmology referral placed today for diabetic eye exam.  Nephropathy: [id number]. Urine microalbuminuria was negative in November 2024 in May 2025.  Neuropathy:  None.  Foot Exam: No exam today.  Taking aspirin: Yes.  Lipids: LDL 56 in April 2025  Insulin: Basal and mealtime insulin.   DM meds: Jardiance.  Testing: DexcomG7 sensor.  Hypoglycemia treatment: Has Gvoke pen.                        ROS  See under HPI.    Allergies  Allergies   Allergen Reactions    Other Food Allergy Anaphylaxis     Legumes (black beans, baked beans, chickpeas)    Fish-Derived Products Hives    Pineapple Itching       Medications  Current Outpatient Medications   Medication Sig Dispense Refill    ACE/ARB/ARNI NOT PRESCRIBED (INTENTIONAL) Please choose reason not prescribed from choices below.      aspirin 81 MG EC tablet Take 1 tablet (81 mg) by mouth daily. 90 tablet 3    blood glucose (NO BRAND SPECIFIED) test strip Use to test blood sugar two times daily or as directed. To accompany: Blood Glucose Monitor Brands: per insurance. 100 strip 6    blood glucose monitoring (NO BRAND SPECIFIED) meter device kit Use to test blood sugar twice times daily or as directed. Preferred blood glucose meter OR supplies to accompany: Blood Glucose Monitor Brands: per insurance. 1 kit 0    Continuous Glucose Sensor (DEXCOM G7 SENSOR) MISC Change every 10 days. 9 each 3    cycloSPORINE modified (GENERIC EQUIVALENT) 100 MG capsule Take 1 capsule (100 mg) by mouth every 12 hours. Total dose 125 mg  capsule 3    cycloSPORINE modified (GENERIC EQUIVALENT) 25 MG capsule Take 1 capsule (25 mg) by mouth every 12 hours. Total dose 125 mg  capsule 3    DULoxetine (CYMBALTA) 30 MG capsule Take 1 capsule (30 mg) by mouth daily. 90 capsule 0    empagliflozin (JARDIANCE) 25 MG TABS tablet Take 1 tablet (25 mg) by mouth daily. 90 tablet 0    Glucagon (GVOKE HYPOPEN) 1 MG/0.2ML pen Inject the contents of 1 device under the skin into lower abdomen, outer thigh, or outer upper arm as needed for hypoglycemia. If no response after 15 minutes, additional 1 mg dose from a new device may be injected while waiting for  emergency assistance. 0.4 mL 1    Injection Device for insulin (CEQUR SIMPLICITY 2U) KHUSHBOO 1 each every 3 days 10 each 5    insulin degludec (TRESIBA FLEXTOUCH) 100 UNIT/ML pen Inject 24 units subcutaneous each am. 15 mL 11    Insulin Lispro-aabc, 1 U Dial, (LYUMJEV KWIKPEN) 100 UNIT/ML SOPN Inject 6 Units subcutaneously 3 times daily (with meals). 6 units with meals, plus correction. Pt may use up to 30 units daily. This REPLACES Humalog/Novolog insulin. 30 mL 2    insulin pen needle (29G X 12.7MM) 29G X 12.7MM miscellaneous For insulin administration 4x daily. 400 each 3    insulin pen needle (32G X 4 MM) 32G X 4 MM miscellaneous Use as directed by provider Per insurance coverage 200 each 1    insulin pen needle (BD PEN NEEDLE SHERRIE 2ND GEN) 32G X 4 MM miscellaneous USES 5 PER  each 3    multivitamin w/minerals (THERA-VIT-M) tablet Take 1 tablet by mouth daily. 90 tablet 3    mycophenolic acid (GENERIC EQUIVALENT) 360 MG EC tablet Take 1 tablet (360 mg) by mouth 2 times daily. 60 tablet 5    omeprazole (PRILOSEC) 20 MG DR capsule Take 20 mg by mouth daily (Patient not taking: Reported on 7/8/2025) 180 capsule 1    oxyCODONE-acetaminophen (PERCOCET) 5-325 MG tablet Take 1 tablet by mouth every 6 hours as needed for pain (max 2 per day; use sparingly). Fill / start 6/18/25 (15 tabs is 30 day supply) 15 tablet 0    pregabalin (LYRICA) 75 MG capsule Take 1 capsule (75 mg) by mouth 2 times daily. 180 capsule 0    QUEtiapine (SEROQUEL) 25 MG tablet Take 1 tablet (25 mg) by mouth at bedtime. 30 tablet 1    sodium zirconium cyclosilicate (LOKELMA) 10 g PACK packet Take 1 packet (10 g) by mouth daily as needed (Hyperkalemia). 30 each 0    tamsulosin (FLOMAX) 0.4 MG capsule Take 2 capsules (0.8 mg) by mouth daily. (Patient not taking: Reported on 7/8/2025) 180 capsule 3    tamsulosin (FLOMAX) 0.4 MG capsule Take 1 capsule (0.4 mg) by mouth daily. (Patient not taking: Reported on 7/8/2025) 90 capsule 3    thin (NO BRAND  SPECIFIED) lancets Use with lanceting device. To accompany: Blood Glucose Monitor Brands: per insurance. 100 each 6       Family History  family history includes Anxiety Disorder in his mother; Bipolar Disorder in his mother; Chronic Obstructive Pulmonary Disease in his mother; Depression in his mother; Diabetes in his father; Diabetes Type 2  in his brother; Lung Cancer (age of onset: 81) in his mother; Morbid Obesity in his father; Substance Abuse in his maternal grandfather and paternal grandfather.    Social History   reports that he has quit smoking. His smoking use included cigarettes. He has never been exposed to tobacco smoke. His smokeless tobacco use includes chew. He reports that he does not currently use alcohol after a past usage of about 12.0 standard drinks of alcohol per week. He reports that he does not currently use drugs.     Past Medical History  Past Medical History:   Diagnosis Date    ANGEL (acute kidney injury) 06/27/2023    Alcoholic cirrhosis of liver without ascites (H) 07/13/2023    Alcoholic hepatitis with ascites (H) 10/03/2023    Alcoholic hepatitis without ascites (H) 07/13/2023    Chronic generalized pain disorder 12/25/2024    Chronic groin pain, right 05/20/2025    CKD stage 3a, GFR 45-59 ml/min (H) 08/08/2024    Closed fracture of one rib of left side 09/14/2023    Concussion without loss of consciousness 03/11/2020    Decompensated hepatic cirrhosis (H) 09/15/2023    Diabetes mellitus, type 2 (H) 11/22/2023    Essential hypertension 03/11/2020    Ganglion cyst of wrist, right 04/18/2024    History of biliary duct stent placement 09/05/2024    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)     Liver replaced by transplant (H) 03/05/2024    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    ACR PRAJAPATI 6-7/9, treated with steroids    Mild hyperlipidemia 12/07/2021    Persistent insomnia 07/13/2023    Portal hypertension (H) 07/13/2023    Right inguinal hernia 08/08/2024    Scrotal  abscess 06/27/2023    Secondary esophageal 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 (H) 07/13/2023    Tobacco abuse disorder 03/11/2020    Type 2 diabetes mellitus with hyperglycemia (H) 12/22/2023       Past Surgical History:   Procedure Laterality Date    BENCH LIVER  03/05/2024    Procedure: Bench liver;  Surgeon: Ryder Cortez MD;  Location: [facility]    CHOLECYSTECTOMY      done at [facility]    COLONOSCOPY N/A 01/02/2024    Procedure: COLONOSCOPY, WITH POLYPECTOMY;  Surgeon: Jak Urbina MD;  Location: [facility]    CV RIGHT HEART CATH MEASUREMENTS RECORDED N/A 01/30/2024    Procedure: Right Heart Catheterization;  Surgeon: Alfred Tafoya MD;  Location: [facility]    ENDOSCOPIC RETROGRADE CHOLANGIOPANCREATOGRAM N/A 10/22/2024    Procedure: ENDOSCOPIC RETROGRADE CHOLANGIOPANCREATOGRAPHY, WITH bile duct stent stent exchange and balloon dilation;  Surgeon: Naldo Rodriguez MD;  Location: [facility]    ENDOSCOPIC RETROGRADE CHOLANGIOPANCREATOGRAM N/A 12/17/2024    Procedure: ENDOSCOPIC RETROGRADE CHOLANGIOPANCREATOGRAPHY BILLIARY DILATION AND STENT PLACEMENT;  Surgeon: Will Martínez MD;  Location: [facility] OR    ENDOSCOPIC RETROGRADE CHOLANGIOPANCREATOGRAPHY, EXCHANGE TUBE/STENT N/A 08/13/2024    Procedure: Endoscopic Retrograde Cholangiopancreatography with biliary sphincterotomy, balloon dilation, pancreatic stent placement,biliary stent exchange;  Surgeon: Naldo Rodriguez MD;  Location: [facility]    ENDOSCOPIC RETROGRADE CHOLANGIOPANCREATOGRAPHY, EXCHANGE TUBE/STENT N/A 02/13/2025    Procedure: ENDOSCOPIC RETROGRADE CHOLANGIOPANCREATOGRAPHY, WITH sludge removal, and stent removal;  Surgeon: Will Martínez MD;  Location: [facility]    ENTEROSCOPY SMALL BOWEL N/A 07/12/2024    Procedure: Enteroscopy small bowel;  Surgeon: Hugo Daigle MD;  Location: [facility]    ESOPHAGOSCOPY, GASTROSCOPY, DUODENOSCOPY (EGD), COMBINED N/A 07/12/2024    Procedure: Esophagoscopy, gastroscopy, duodenoscopy (EGD),  combined;  Surgeon: Hugo Daigle MD;  Location: [facility]    HERNIORRHAPHY INGUINAL Right 2024    Procedure: Open Inguinal Hernia Repair;  Surgeon: Ryder Cortez MD;  Location: [facility]    IR LIVER BIOPSY PERCUTANEOUS  03/15/2024    IR RETROPERITONEAL ABSCESS DRAINAGE  2024    TONSILLECTOMY      TRANSPLANT LIVER RECIPIENT  DONOR N/A 2024    Procedure: Transplant liver recipient  donor, bile duct stent placement;  Surgeon: Ryder Cortez MD;  Location: [facility]    VASECTOMY         Physical Exam    No exam today.      RESULTS  Creatinine   Date Value Ref Range Status   07/15/2025 1.54 (H) 0.67 - 1.17 mg/dL Final   2020 0.95 0.66 - 1.25 mg/dL Final     GFR Estimate   Date Value Ref Range Status   07/15/2025 54 (L) >60 mL/min/1.73m2 Final     Comment:     eGFR calculated using  CKD-EPI equation.   2020 >90 >60 mL/min/[1.73_m2] Final     Comment:     Non  GFR Calc  Starting 2018, serum creatinine based estimated GFR (eGFR) will be   calculated using the Chronic Kidney Disease Epidemiology Collaboration   (CKD-EPI) equation.       Hemoglobin A1C   Date Value Ref Range Status   07/15/2025 7.7 (H) 0.0 - 5.6 % Final     Comment:     Normal <5.7%   Prediabetes 5.7-6.4%    Diabetes 6.5% or higher     Note: Adopted from ADA consensus guidelines.     Potassium   Date Value Ref Range Status   07/15/2025 5.2 3.4 - 5.3 mmol/L Final   2022 3.8 3.4 - 5.3 mmol/L Final   2020 4.2 3.4 - 5.3 mmol/L Final     Potassium POCT   Date Value Ref Range Status   2024 4.1 3.4 - 5.3 mmol/L Final     ALT   Date Value Ref Range Status   07/15/2025 57 0 - 70 U/L Final   2006 50 0 - 70 U/L Final     AST   Date Value Ref Range Status   07/15/2025 53 (H) 0 - 45 U/L Final   2006 52 0 - 55 U/L Final     TSH   Date Value Ref Range Status   2024 1.07 0.30 - 4.20 uIU/mL Final   2022 2.18 0.40 - 4.00 mU/L Final       Cholesterol    Date Value Ref Range Status   04/15/2025 140 <200 mg/dL Final   08/08/2024 148 <200 mg/dL Final   07/05/2020 183 <200 mg/dL Final     HDL Cholesterol   Date Value Ref Range Status   07/05/2020 50 >39 mg/dL Final     Direct Measure HDL   Date Value Ref Range Status   04/15/2025 42 >=40 mg/dL Final   08/08/2024 43 >=40 mg/dL Final     LDL Cholesterol Calculated   Date Value Ref Range Status   04/15/2025 56 <100 mg/dL Final   08/08/2024 75 <=100 mg/dL Final   07/05/2020 87 <100 mg/dL Final     Comment:     Desirable:       <100 mg/dl     Triglycerides   Date Value Ref Range Status   04/15/2025 209 (H) <150 mg/dL Final   08/08/2024 151 (H) <150 mg/dL Final   07/05/2020 229 (H) <150 mg/dL Final     Comment:     Borderline high:  150-199 mg/dl  High:             200-499 mg/dl  Very high:       >499 mg/dl           ASSESSMENT/PLAN:     TYPE 2 DIABETES MELLITUS: 54-year-old male with type 2 diabetes mellitus.  History of [id number] with no microalbuminuria/proteinuria.  Most recent creatinine 1.54 with GFR 54 mL/min on 7/15/2025.  No known history of diabetic retinopathy.  Ophthalmology referral was placed today.  Patient denies any symptoms of diabetic peripheral neuropathy or foot ulcers.  Mary has been having overnight and early am hypoglycemia.  I had patient decrease Tresiba dose 22 units daily.  No change in mealtime insulin dosing at this time.  I will arrange for him to see Liana Garcia RD/CDE for diabetes ed follow-up.    Continue Jardiance 25 mg each am. I reviewed how Jardiance works and possible side effect of the drug including dehydration, UTI, groin yeast infection, etc. I also reviewed the cardiovascular and renal benefits of Jardiance.  Reminded patient to stay well-hydrated while taking Jardiance.   Encourage patient to make healthy food choices and to walk daily.   No vitals today.  /85 on 6/6/2025.  LIVER CIRRHOSIS: S/P liver transplant on 3/5/2024.   Followed by transplant staff.  LIPIDS: LDL  56 in April 2025.  CKD: Chronic kidney disease with no proteinuria.  Most recent creatinine GFR as above.  Reminded patient stay well-hydrated.  Patient taking immunosuppressive medications.    HEALTH MAINTENANCE: See primary care provider annually for health maintenance.  FOLLOW UP: With me in 3 and 6 months. Follow up with Liana Garcia RD/JARROD in 2 weeks.  Ophthalmology referral placed today.  A1c ordered today to be done anytime after 10/15/2025.    Spent reviewing chart, labs and DexcomG7 sensor data= 5 minutes.  Time for video visit today=  16 minutes.  Time for documentation today= 10 minutes.    Total time for visit today= 31 minutes.    Geovanna Ferreira PA-C

## 2025-07-31 NOTE — LETTER
7/31/2025       RE: Mary Lopez  [address]  [place] [address]     Dear Colleague,    Thank you for referring your patient, Mary Lopez, to the [facility] [place] at [facility]. Please see a copy of my visit note below.    Outcome for 07/18/25 3:21 PM: Data uploaded on Dexcom  Feli Hannon MA  Outcome for 07/29/25 9:54 AM: Data obtained via Dexcom website  Feli Hannon MA                    Time of start: 8:40 AM  Time of end: 8:56 AM  Total duration of video visit: 16 minutes.  Providers location: Off-site.  Patient's location: [place].    HPI  Mary Lopez is a 54 year-old male with history of type 2 diabetes mellitus.  Video visit today for diabetes follow-up.  His wife Rosio is present during our video visit today.  Patient last seen by me in September 2024.  Pt has been seen by Liana Garcia RD/JESUSE.  Patient was admitted 3/4/2024-3/21/2024 and underwent liver transplant on 3/5/2024.  History of [id number] and alcohol use disorder in remission.  For his diabetes, patient is currently taking Tresiba 24 units subcutaneous each am and  Lyumjev 6 units with meals.  He is not using correction insulin.  Patient is also taking Jardiance 25 mg each am.  Mary did not like the Cequr insulin patch.    Most recent A1C was 7.7 % on 7/15/2025.  Previous A1C was 8.3 % in April 2025.    I reviewed and scanned his DexcomG7 sensor download data in his note below.  Average glucose 203 with an estimate A1C 8.2 % for the past 14 days.    Blood sugar in target range 45 % of the time and below target range 4 % of the time.  Some overnight hypoglycemia.  Patient also has a tendency to snack late at night and will have some high blood sugars when he goes to bed.    Patient denies any severe hypoglycemia.  On ROS today, patient on permanent disability.  He states he does stay active around the house.  Some blurred  vision.  Stools have been loose and oily.  Denies abdominal pain, blood in stool or melena.  Patient denies nausea, vomiting, shortness of breath at rest, cough or fever.  No chest pain, dysuria or hematuria.  Mary denies any symptoms of diabetic peripheral neuropathy or foot ulcers.    Diabetes Care  Retinopathy: None per patient.  Ophthalmology referral placed today for diabetic eye exam.  Nephropathy: [id number]. Urine microalbuminuria was negative in November 2024 in May 2025.  Neuropathy: None.  Foot Exam: No exam today.  Taking aspirin: Yes.  Lipids: LDL 56 in April 2025  Insulin: Basal and mealtime insulin.   DM meds: Jardiance.  Testing: DexcomG7 sensor.  Hypoglycemia treatment: Has Gvoke pen.                        ROS  See under HPI.    Allergies  Allergies   Allergen Reactions     Other Food Allergy Anaphylaxis     Legumes (black beans, baked beans, chickpeas)     Fish-Derived Products Hives     Pineapple Itching       Medications  Current Outpatient Medications   Medication Sig Dispense Refill     ACE/ARB/ARNI NOT PRESCRIBED (INTENTIONAL) Please choose reason not prescribed from choices below.       aspirin 81 MG EC tablet Take 1 tablet (81 mg) by mouth daily. 90 tablet 3     blood glucose (NO BRAND SPECIFIED) test strip Use to test blood sugar two times daily or as directed. To accompany: Blood Glucose Monitor Brands: per insurance. 100 strip 6     blood glucose monitoring (NO BRAND SPECIFIED) meter device kit Use to test blood sugar twice times daily or as directed. Preferred blood glucose meter OR supplies to accompany: Blood Glucose Monitor Brands: per insurance. 1 kit 0     Continuous Glucose Sensor (DEXCOM G7 SENSOR) MISC Change every 10 days. 9 each 3     cycloSPORINE modified (GENERIC EQUIVALENT) 100 MG capsule Take 1 capsule (100 mg) by mouth every 12 hours. Total dose 125 mg  capsule 3     cycloSPORINE modified (GENERIC EQUIVALENT) 25 MG capsule Take 1 capsule (25 mg) by mouth every 12  hours. Total dose 125 mg  capsule 3     DULoxetine (CYMBALTA) 30 MG capsule Take 1 capsule (30 mg) by mouth daily. 90 capsule 0     empagliflozin (JARDIANCE) 25 MG TABS tablet Take 1 tablet (25 mg) by mouth daily. 90 tablet 0     Glucagon (GVOKE HYPOPEN) 1 MG/0.2ML pen Inject the contents of 1 device under the skin into lower abdomen, outer thigh, or outer upper arm as needed for hypoglycemia. If no response after 15 minutes, additional 1 mg dose from a new device may be injected while waiting for emergency assistance. 0.4 mL 1     Injection Device for insulin (CEQUR SIMPLICITY 2U) KHUSHBOO 1 each every 3 days 10 each 5     insulin degludec (TRESIBA FLEXTOUCH) 100 UNIT/ML pen Inject 24 units subcutaneous each am. 15 mL 11     Insulin Lispro-aabc, 1 U Dial, (LYUMJEV KWIKPEN) 100 UNIT/ML SOPN Inject 6 Units subcutaneously 3 times daily (with meals). 6 units with meals, plus correction. Pt may use up to 30 units daily. This REPLACES Humalog/Novolog insulin. 30 mL 2     insulin pen needle (29G X 12.7MM) 29G X 12.7MM miscellaneous For insulin administration 4x daily. 400 each 3     insulin pen needle (32G X 4 MM) 32G X 4 MM miscellaneous Use as directed by provider Per insurance coverage 200 each 1     insulin pen needle (BD PEN NEEDLE SHERRIE 2ND GEN) 32G X 4 MM miscellaneous USES 5 PER  each 3     multivitamin w/minerals (THERA-VIT-M) tablet Take 1 tablet by mouth daily. 90 tablet 3     mycophenolic acid (GENERIC EQUIVALENT) 360 MG EC tablet Take 1 tablet (360 mg) by mouth 2 times daily. 60 tablet 5     omeprazole (PRILOSEC) 20 MG DR capsule Take 20 mg by mouth daily (Patient not taking: Reported on 7/8/2025) 180 capsule 1     oxyCODONE-acetaminophen (PERCOCET) 5-325 MG tablet Take 1 tablet by mouth every 6 hours as needed for pain (max 2 per day; use sparingly). Fill / start 6/18/25 (15 tabs is 30 day supply) 15 tablet 0     pregabalin (LYRICA) 75 MG capsule Take 1 capsule (75 mg) by mouth 2 times daily. 180  capsule 0     QUEtiapine (SEROQUEL) 25 MG tablet Take 1 tablet (25 mg) by mouth at bedtime. 30 tablet 1     sodium zirconium cyclosilicate (LOKELMA) 10 g PACK packet Take 1 packet (10 g) by mouth daily as needed (Hyperkalemia). 30 each 0     tamsulosin (FLOMAX) 0.4 MG capsule Take 2 capsules (0.8 mg) by mouth daily. (Patient not taking: Reported on 7/8/2025) 180 capsule 3     tamsulosin (FLOMAX) 0.4 MG capsule Take 1 capsule (0.4 mg) by mouth daily. (Patient not taking: Reported on 7/8/2025) 90 capsule 3     thin (NO BRAND SPECIFIED) lancets Use with lanceting device. To accompany: Blood Glucose Monitor Brands: per insurance. 100 each 6       Family History  family history includes Anxiety Disorder in his mother; Bipolar Disorder in his mother; Chronic Obstructive Pulmonary Disease in his mother; Depression in his mother; Diabetes in his father; Diabetes Type 2  in his brother; Lung Cancer (age of onset: 81) in his mother; Morbid Obesity in his father; Substance Abuse in his maternal grandfather and paternal grandfather.    Social History   reports that he has quit smoking. His smoking use included cigarettes. He has never been exposed to tobacco smoke. His smokeless tobacco use includes chew. He reports that he does not currently use alcohol after a past usage of about 12.0 standard drinks of alcohol per week. He reports that he does not currently use drugs.     Past Medical History  Past Medical History:   Diagnosis Date     ANGEL (acute kidney injury) 06/27/2023     Alcoholic cirrhosis of liver without ascites (H) 07/13/2023     Alcoholic hepatitis with ascites (H) 10/03/2023     Alcoholic hepatitis without ascites (H) 07/13/2023     Chronic generalized pain disorder 12/25/2024     Chronic groin pain, right 05/20/2025     CKD stage 3a, GFR 45-59 ml/min (H) 08/08/2024     Closed fracture of one rib of left side 09/14/2023     Concussion without loss of consciousness 03/11/2020     Decompensated hepatic cirrhosis (H)  09/15/2023     Diabetes mellitus, type 2 (H) 11/22/2023     Essential hypertension 03/11/2020     Ganglion cyst of wrist, right 04/18/2024     History of biliary duct stent placement 09/05/2024     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)      Liver replaced by transplant (H) 03/05/2024     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     COLONOSCOPY N/A 01/02/2024    Procedure: COLONOSCOPY, WITH POLYPECTOMY;  Surgeon: Jak Urbina MD;  Location: [facility]     CV RIGHT HEART CATH MEASUREMENTS RECORDED N/A 01/30/2024    Procedure: Right Heart Catheterization;  Surgeon: Alfred Tafoya MD;  Location:  HEART CARDIAC CATH LAB     ENDOSCOPIC RETROGRADE CHOLANGIOPANCREATOGRAM N/A 10/22/2024    Procedure: ENDOSCOPIC RETROGRADE CHOLANGIOPANCREATOGRAPHY, WITH bile duct stent stent exchange and balloon dilation;  Surgeon: Naldo Rodriguez MD;  Location:  OR     ENDOSCOPIC RETROGRADE CHOLANGIOPANCREATOGRAM N/A 12/17/2024    Procedure: ENDOSCOPIC RETROGRADE CHOLANGIOPANCREATOGRAPHY BILLIARY DILATION AND STENT PLACEMENT;  Surgeon: Will Martínez MD;  Location:  OR     ENDOSCOPIC RETROGRADE CHOLANGIOPANCREATOGRAPHY, EXCHANGE TUBE/STENT N/A 08/13/2024    Procedure: Endoscopic Retrograde Cholangiopancreatography with biliary sphincterotomy, balloon dilation, pancreatic stent placement,biliary stent exchange;  Surgeon:  Naldo Rodriguez MD;  Location: [facility] OR     ENDOSCOPIC RETROGRADE CHOLANGIOPANCREATOGRAPHY, EXCHANGE TUBE/STENT N/A 2025    Procedure: ENDOSCOPIC RETROGRADE CHOLANGIOPANCREATOGRAPHY, WITH sludge removal, and stent removal;  Surgeon: Will Matrínez MD;  Location: [facility] OR     ENTEROSCOPY SMALL BOWEL N/A 2024    Procedure: Enteroscopy small bowel;  Surgeon: Hugo Daigle MD;  Location: [facility]     ESOPHAGOSCOPY, GASTROSCOPY, DUODENOSCOPY (EGD), COMBINED N/A 2024    Procedure: Esophagoscopy, gastroscopy, duodenoscopy (EGD), combined;  Surgeon: Hugo Daigle MD;  Location: [facility]     HERNIORRHAPHY INGUINAL Right 2024    Procedure: Open Inguinal Hernia Repair;  Surgeon: Ryder Cortez MD;  Location: [facility] OR     IR LIVER BIOPSY PERCUTANEOUS  03/15/2024     IR RETROPERITONEAL ABSCESS DRAINAGE  2024     TONSILLECTOMY       TRANSPLANT LIVER RECIPIENT  DONOR N/A 2024    Procedure: Transplant liver recipient  donor, bile duct stent placement;  Surgeon: Ryder Cortez MD;  Location: [facility] OR     VASECTOMY         Physical Exam    No exam today.      RESULTS  Creatinine   Date Value Ref Range Status   07/15/2025 1.54 (H) 0.67 - 1.17 mg/dL Final   2020 0.95 0.66 - 1.25 mg/dL Final     GFR Estimate   Date Value Ref Range Status   07/15/2025 54 (L) >60 mL/min/1.73m2 Final     Comment:     eGFR calculated using  CKD-EPI equation.   2020 >90 >60 mL/min/[1.73_m2] Final     Comment:     Non  GFR Calc  Starting 2018, serum creatinine based estimated GFR (eGFR) will be   calculated using the Chronic Kidney Disease Epidemiology Collaboration   (CKD-EPI) equation.       Hemoglobin A1C   Date Value Ref Range Status   07/15/2025 7.7 (H) 0.0 - 5.6 % Final     Comment:     Normal <5.7%   Prediabetes 5.7-6.4%    Diabetes 6.5% or higher     Note: Adopted from ADA consensus guidelines.     Potassium   Date Value Ref Range Status    07/15/2025 5.2 3.4 - 5.3 mmol/L Final   03/12/2022 3.8 3.4 - 5.3 mmol/L Final   07/05/2020 4.2 3.4 - 5.3 mmol/L Final     Potassium POCT   Date Value Ref Range Status   03/05/2024 4.1 3.4 - 5.3 mmol/L Final     ALT   Date Value Ref Range Status   07/15/2025 57 0 - 70 U/L Final   12/19/2006 50 0 - 70 U/L Final     AST   Date Value Ref Range Status   07/15/2025 53 (H) 0 - 45 U/L Final   12/19/2006 52 0 - 55 U/L Final     TSH   Date Value Ref Range Status   11/25/2024 1.07 0.30 - 4.20 uIU/mL Final   03/12/2022 2.18 0.40 - 4.00 mU/L Final       Cholesterol   Date Value Ref Range Status   04/15/2025 140 <200 mg/dL Final   08/08/2024 148 <200 mg/dL Final   07/05/2020 183 <200 mg/dL Final     HDL Cholesterol   Date Value Ref Range Status   07/05/2020 50 >39 mg/dL Final     Direct Measure HDL   Date Value Ref Range Status   04/15/2025 42 >=40 mg/dL Final   08/08/2024 43 >=40 mg/dL Final     LDL Cholesterol Calculated   Date Value Ref Range Status   04/15/2025 56 <100 mg/dL Final   08/08/2024 75 <=100 mg/dL Final   07/05/2020 87 <100 mg/dL Final     Comment:     Desirable:       <100 mg/dl     Triglycerides   Date Value Ref Range Status   04/15/2025 209 (H) <150 mg/dL Final   08/08/2024 151 (H) <150 mg/dL Final   07/05/2020 229 (H) <150 mg/dL Final     Comment:     Borderline high:  150-199 mg/dl  High:             200-499 mg/dl  Very high:       >499 mg/dl           ASSESSMENT/PLAN:     TYPE 2 DIABETES MELLITUS: 54-year-old male with type 2 diabetes mellitus.  History of [id number] with no microalbuminuria/proteinuria.  Most recent creatinine 1.54 with GFR 54 mL/min on 7/15/2025.  No known history of diabetic retinopathy.  Ophthalmology referral was placed today.  Patient denies any symptoms of diabetic peripheral neuropathy or foot ulcers.  Mary has been having overnight and early am hypoglycemia.  I had patient decrease Tresiba dose 22 units daily.  No change in mealtime insulin dosing at this time.  I will arrange for  him to see Liana LINDSAY for diabetes ed follow-up.    Continue Jardiance 25 mg each am. I reviewed how Jardiance works and possible side effect of the drug including dehydration, UTI, groin yeast infection, etc. I also reviewed the cardiovascular and renal benefits of Jardiance.  Reminded patient to stay well-hydrated while taking Jardiance.   Encourage patient to make healthy food choices and to walk daily.   No vitals today.  /85 on 6/6/2025.  LIVER CIRRHOSIS: S/P liver transplant on 3/5/2024.   Followed by transplant staff.  LIPIDS: LDL 56 in April 2025.  CKD: Chronic kidney disease with no proteinuria.  Most recent creatinine GFR as above.  Reminded patient stay well-hydrated.  Patient taking immunosuppressive medications.    HEALTH MAINTENANCE: See primary care provider annually for health maintenance.  FOLLOW UP: With me in 3 and 6 months. Follow up with Liana LINDSAY in 2 weeks.  Ophthalmology referral placed today.  A1c ordered today to be done anytime after 10/15/2025.    Spent reviewing chart, labs and DexcomG7 sensor data= 5 minutes.  Time for video visit today=  16 minutes.  Time for documentation today= 10 minutes.    Total time for visit today= 31 minutes.    Geovanna Ferreira PA-C      Again, thank you for allowing me to participate in the care of your patient.      Sincerely,    Geovanna Ferreira PA-C

## 2025-08-04 ENCOUNTER — PATIENT OUTREACH (OUTPATIENT)
Dept: CARE COORDINATION | Facility: CLINIC | Age: 55
End: 2025-08-04
Payer: COMMERCIAL

## 2025-08-11 ENCOUNTER — LAB (OUTPATIENT)
Dept: LAB | Facility: CLINIC | Age: 55
End: 2025-08-11
Payer: COMMERCIAL

## 2025-08-11 DIAGNOSIS — Z94.4 LIVER REPLACED BY TRANSPLANT (H): Chronic | ICD-10-CM

## 2025-08-11 PROCEDURE — 36415 COLL VENOUS BLD VENIPUNCTURE: CPT

## 2025-08-11 PROCEDURE — 80158 DRUG ASSAY CYCLOSPORINE: CPT

## 2025-08-13 DIAGNOSIS — G47.00 PERSISTENT INSOMNIA: Chronic | ICD-10-CM

## 2025-08-13 RX ORDER — QUETIAPINE FUMARATE 25 MG/1
25 TABLET, FILM COATED ORAL AT BEDTIME
Qty: 90 TABLET | Refills: 1 | Status: SHIPPED | OUTPATIENT
Start: 2025-08-13

## 2025-08-22 ENCOUNTER — VIRTUAL VISIT (OUTPATIENT)
Dept: EDUCATION SERVICES | Facility: CLINIC | Age: 55
End: 2025-08-22
Payer: COMMERCIAL

## 2025-08-22 DIAGNOSIS — Z79.4 TYPE 2 DIABETES MELLITUS WITH HYPERGLYCEMIA, WITH LONG-TERM CURRENT USE OF INSULIN (H): ICD-10-CM

## 2025-08-22 DIAGNOSIS — E11.65 TYPE 2 DIABETES MELLITUS WITH HYPERGLYCEMIA, WITH LONG-TERM CURRENT USE OF INSULIN (H): ICD-10-CM

## 2025-08-22 PROCEDURE — 1126F AMNT PAIN NOTED NONE PRSNT: CPT | Mod: 95 | Performed by: NUTRITIONIST

## 2025-08-22 PROCEDURE — 99207 PR NO BILLABLE SERVICE THIS VISIT: CPT | Mod: 95 | Performed by: NUTRITIONIST

## 2025-08-27 ENCOUNTER — OFFICE VISIT (OUTPATIENT)
Dept: OPTOMETRY | Facility: CLINIC | Age: 55
End: 2025-08-27
Attending: PHYSICIAN ASSISTANT
Payer: COMMERCIAL

## 2025-08-27 DIAGNOSIS — H52.13 MYOPIA, BILATERAL: Primary | ICD-10-CM

## 2025-08-27 DIAGNOSIS — E11.65 TYPE 2 DIABETES MELLITUS WITH HYPERGLYCEMIA, WITH LONG-TERM CURRENT USE OF INSULIN (H): ICD-10-CM

## 2025-08-27 DIAGNOSIS — Z79.4 TYPE 2 DIABETES MELLITUS WITH HYPERGLYCEMIA, WITH LONG-TERM CURRENT USE OF INSULIN (H): ICD-10-CM

## 2025-08-27 PROCEDURE — 92015 DETERMINE REFRACTIVE STATE: CPT

## 2025-08-27 PROCEDURE — 92014 COMPRE OPH EXAM EST PT 1/>: CPT

## 2025-08-27 ASSESSMENT — KERATOMETRY
OS_K2POWER_DIOPTERS: 43.50
OD_K2POWER_DIOPTERS: 42.75
OS_K1POWER_DIOPTERS: 41.75
OS_AXISANGLE_DEGREES: 87
OD_AXISANGLE2_DEGREES: 174
OS_AXISANGLE2_DEGREES: 177
OD_K1POWER_DIOPTERS: 41.75
OD_AXISANGLE_DEGREES: 84

## 2025-08-27 ASSESSMENT — TONOMETRY
OS_IOP_MMHG: 21.7
OD_IOP_MMHG: 21.9
IOP_METHOD: TONOPEN

## 2025-08-27 ASSESSMENT — REFRACTION_WEARINGRX
OS_SPHERE: -1.00
OD_SPHERE: -0.75
OS_CYLINDER: SPHERE
OD_CYLINDER: SPHERE

## 2025-08-27 ASSESSMENT — VISUAL ACUITY
OD_CC: 20/20
OD_SC: 20/50
OS_SC: 20/50
OS_SC: 20/30
OS_CC+: -1
OS_CC: 20/20
OD_SC+: +2
METHOD: SNELLEN - LINEAR
OD_SC: 20/30
CORRECTION_TYPE: GLASSES

## 2025-08-27 ASSESSMENT — REFRACTION_MANIFEST
OS_SPHERE: -1.00
OD_CYLINDER: SPHERE
OS_CYLINDER: SPHERE
OD_SPHERE: -1.00

## 2025-08-27 ASSESSMENT — EXTERNAL EXAM - RIGHT EYE: OD_EXAM: NORMAL

## 2025-08-27 ASSESSMENT — EXTERNAL EXAM - LEFT EYE: OS_EXAM: NORMAL

## 2025-08-27 ASSESSMENT — SLIT LAMP EXAM - LIDS: COMMENTS: NORMAL

## 2025-08-29 ENCOUNTER — TELEPHONE (OUTPATIENT)
Dept: ORTHOPEDICS | Facility: CLINIC | Age: 55
End: 2025-08-29
Payer: COMMERCIAL

## 2025-09-03 ENCOUNTER — TELEPHONE (OUTPATIENT)
Dept: TRANSPLANT | Facility: CLINIC | Age: 55
End: 2025-09-03
Payer: COMMERCIAL

## 2025-09-03 ENCOUNTER — TELEPHONE (OUTPATIENT)
Dept: GASTROENTEROLOGY | Facility: CLINIC | Age: 55
End: 2025-09-03
Payer: COMMERCIAL

## 2025-09-03 ENCOUNTER — MYC REFILL (OUTPATIENT)
Dept: PALLIATIVE MEDICINE | Facility: CLINIC | Age: 55
End: 2025-09-03
Payer: COMMERCIAL

## 2025-09-03 DIAGNOSIS — R10.84 ABDOMINAL PAIN, GENERALIZED: ICD-10-CM

## 2025-09-03 DIAGNOSIS — G89.28 OTHER CHRONIC POSTPROCEDURAL PAIN: ICD-10-CM

## 2025-09-04 ENCOUNTER — TELEPHONE (OUTPATIENT)
Dept: FAMILY MEDICINE | Facility: CLINIC | Age: 55
End: 2025-09-04
Payer: COMMERCIAL

## 2025-09-04 RX ORDER — OXYCODONE AND ACETAMINOPHEN 5; 325 MG/1; MG/1
1 TABLET ORAL EVERY 6 HOURS PRN
Qty: 15 TABLET | Refills: 0 | Status: SHIPPED | OUTPATIENT
Start: 2025-09-04

## 2025-09-05 ENCOUNTER — PRE VISIT (OUTPATIENT)
Dept: ORTHOPEDICS | Facility: CLINIC | Age: 55
End: 2025-09-05

## (undated) DEVICE — BLADE CLIPPER SGL USE 9680

## (undated) DEVICE — SU SILK 4-0 TIE 12X30" A303H

## (undated) DEVICE — SU PDS II 1 CTX 36" Z371T

## (undated) DEVICE — ESU LIGASURE IMPACT OPEN SEALER/DVDR CVD LG JAW LF4418

## (undated) DEVICE — NDL CANNULA INTERLINK BLUNT 18GA

## (undated) DEVICE — NDL COUNTER 40CT  31142311

## (undated) DEVICE — DRSG TEGADERM 8X12" 1629

## (undated) DEVICE — PACK ENDOSCOPY GI CUSTOM UMMC

## (undated) DEVICE — CATH RETRIEVAL BALLOON EXTRACTOR PRO RX-S INJ ABOVE 9-12MM

## (undated) DEVICE — SOL WATER IRRIG 1000ML BOTTLE 2F7114

## (undated) DEVICE — KIT ENDO TURNOVER/PROCEDURE CARRY-ON 101822

## (undated) DEVICE — SU PROLENE 6-0 RB-2DA 30" 8711H

## (undated) DEVICE — ENDO CATH BALLOON DILATION HURRICANE 08MMX4X180CM M00545940

## (undated) DEVICE — ENDO ENDO DISTAL MAJ-2315

## (undated) DEVICE — LINEN TOWEL PACK X5 5464

## (undated) DEVICE — WIPES FOLEY CARE SURESTEP PROVON DFC100

## (undated) DEVICE — BAG DECANTER STERILE WHITE DYNJDEC09

## (undated) DEVICE — SURGICEL ABSORBABLE HEMOSTAT SNOW 4"X4" 2083

## (undated) DEVICE — BITE BLOCK ENDO W/DENTAL RIM 54FR SBT-114-100

## (undated) DEVICE — INTRO SHEATH MICRO PLATINUM TIP 4FRX40CM 7274

## (undated) DEVICE — SU PROLENE 3-0 SHDA 36" 8522H

## (undated) DEVICE — ENDO SNARE POLYPECTOMY OVAL 15MM LOOP SD-240U-15

## (undated) DEVICE — PREP CHLORAPREP 26ML TINTED HI-LITE ORANGE 930815

## (undated) DEVICE — SU PDS II 6-0 RB-2DA 30" Z149H

## (undated) DEVICE — SYR 01ML 27GA 0.5" NDL TBC 309623

## (undated) DEVICE — ENDO FUSION OMNI-TOME 21 FS-OMNI-21 G48675

## (undated) DEVICE — ESU GROUND PAD ADULT W/CORD E7507

## (undated) DEVICE — INFLATION DEVICE BIG 60 ENDO-AN6012

## (undated) DEVICE — ESU HANDPIECE ABC BEND-A-BEAM 6" 134006

## (undated) DEVICE — SU SILK 1 TIE 6X30" A307H

## (undated) DEVICE — LINEN TOWEL PACK X6 WHITE 5487

## (undated) DEVICE — ENDO CATH BALLOON DILATION HURRICANE 06MMX4X180CM M00545920

## (undated) DEVICE — CLIP ENDO HEMO-LOC GREEN MED/LG 544230

## (undated) DEVICE — BIOPSY VALVE BIOSHIELD 00711135

## (undated) DEVICE — SU SILK 2-0 SH CR 8X18" C012D

## (undated) DEVICE — NDL COUNTER 20CT 31142493

## (undated) DEVICE — CUP AND LID 2PK 2OZ STERILE  SSK9006A

## (undated) DEVICE — ENDO FUSION OMNI-TOME G31903

## (undated) DEVICE — SU SILK 2-0 TIE 12X30" A305H

## (undated) DEVICE — INTRO SHEATH 7FRX10CM PINNACLE RSS702

## (undated) DEVICE — SUCTION MANIFOLD NEPTUNE 2 SYS 4 PORT 0702-020-000

## (undated) DEVICE — SYR 10ML LL W/O NDL 302995

## (undated) DEVICE — ENDO BITE BLOCK ADULT OMNI-BLOC

## (undated) DEVICE — ENDO TUBING CO2 SMARTCAP STERILE DISP 100145CO2EXT

## (undated) DEVICE — WIRE GUIDE 0.025"X270CM ANG VISIGLIDE G-240-2527A

## (undated) DEVICE — SU PDS II 5-0 RB-1 27" Z303H

## (undated) DEVICE — SU PDS II 3-0 SH 27" Z316H

## (undated) DEVICE — SU PROLENE 4-0 SHDA 36" 8521H

## (undated) DEVICE — ENDO FCP GRASPING ROTATABLE 7.2MMX2.6MM FG-244NR

## (undated) DEVICE — Device

## (undated) DEVICE — SU VICRYL 0 CT-1 36" J346H

## (undated) DEVICE — GLOVE BIOGEL PI ULTRATOUCH G SZ 7.5 42175

## (undated) DEVICE — SU VICRYL 0 CT-1 CR 8X27" UND JJ41G

## (undated) DEVICE — SU PROLENE 5-0 RB-1DA 36"  8556H

## (undated) DEVICE — LINEN TOWEL PACK X30 5481

## (undated) DEVICE — TUBING IRRIG CYSTO/BLADDER SET 81" LF 2C4040

## (undated) DEVICE — RX VISTASEAL FIBRIN SEALANT W/THROMBIN 10ML VST10

## (undated) DEVICE — FILTER HEPA FLUID TRAP NEPTUNE 0703-040-001

## (undated) DEVICE — DRAPE IOBAN INCISE LARGE 6658

## (undated) DEVICE — CLIP APPLIER 09 3/8" SM LIGACLIP MCS20

## (undated) DEVICE — GUIDEWIRE NOVAGOLD .018X260CM STR TIP M00552000

## (undated) DEVICE — CLIP APPLIER 11" MED LIGACLIP MCM30

## (undated) DEVICE — CLIP ENDO HEMO-LOC PURPLE LG 544240

## (undated) DEVICE — DRAIN PENROSE 1/4"X18" LATEX  30416-025

## (undated) DEVICE — SUCTION TIP POOLE K770

## (undated) DEVICE — SOL NACL 0.9% IRRIG 1000ML BOTTLE 2F7124

## (undated) DEVICE — DRAPE IOBAN INCISE 23X17" 6650EZ

## (undated) DEVICE — SPONGE RAY-TEC 4X8" 7318

## (undated) DEVICE — PACK HEART RIGHT CUSTOM SAN32RHF18

## (undated) DEVICE — IMPLANTABLE DEVICE: Type: IMPLANTABLE DEVICE | Site: BILE DUCT | Status: NON-FUNCTIONAL

## (undated) DEVICE — KIT CONNECTOR FOR OLYMPUS ENDOSCOPES DEFENDO 100310

## (undated) DEVICE — RAD RX ISOVUE 300 (50ML) 61% IOPAMIDOL CHARGE PER ML

## (undated) DEVICE — CATH TRAY FOLEY SURESTEP 16FR W/TMP PRB STLK LATEX A319416AM

## (undated) DEVICE — ESU GROUND PAD THERMOGUARD PLUS ABC PEDS 7-382

## (undated) DEVICE — SUCTION TIP YANKAUER W/O VENT K86

## (undated) DEVICE — SPONGE LAP 18X18" X8435

## (undated) DEVICE — DEFIB PRO-PADZ LVP LQD GEL ADULT 8900-2105-01

## (undated) DEVICE — KIT ENDO FIRST STEP DISINFECTANT 200ML W/POUCH EP-4

## (undated) DEVICE — SU PROLENE 3-0 RB-1DA 36"  8558H

## (undated) DEVICE — STPL LINEAR 30X2.5MM VASC TX30V

## (undated) DEVICE — CLIP APPLIER 13" LG LIGACLIP MCL20

## (undated) DEVICE — TUBE FEEDING NEOCONNECT POLY ENFIT 8FR 24" PFTM8.0P-NC

## (undated) DEVICE — SU MONOCRYL 4-0 PS-2 27" UND Y426H

## (undated) DEVICE — SU VICRYL 0 CT-1 27" UND J260H

## (undated) DEVICE — SU SILK 3-0 SH CR 8X18" C013D

## (undated) DEVICE — SU SILK 3-0 SH 30" K832H

## (undated) DEVICE — ENDO FORCEP ENDOJAW BIOPSY 2.8MMX230CM FB-220U

## (undated) DEVICE — DRSG GAUZE 4X4" 3033

## (undated) DEVICE — BLADE KNIFE SURG 15 371115

## (undated) DEVICE — SU SILK 3-0 TIE 12X30" A304H

## (undated) DEVICE — SPONGE KITTNER 30-101

## (undated) DEVICE — PATCH SURGICAL EVARREST FIBRIN SEALANT 4X2IN EVT5024

## (undated) DEVICE — INTR ENDOSCOPIC STENT FUSION OASIS 09.0FRX200CM

## (undated) DEVICE — SU ETHILON 3-0 PS-1 18" 1663H

## (undated) DEVICE — SU VICRYL 3-0 SH 27" UND J416H

## (undated) DEVICE — ENDO CATH BALLOON DILATION HURRICAINE 08MMX4CM M00545940

## (undated) DEVICE — BALLOON EXTRACTION 15X1950MM 3.2MM TL B-V243Q-A

## (undated) DEVICE — SU PROLENE 7-0 BV-1DA 30" 8703H

## (undated) DEVICE — SU PROLENE 4-0 RB-1DA 36" 8557H

## (undated) DEVICE — SURGICEL FIBRILLAR HEMOSTAT 4"X4" 1963

## (undated) DEVICE — RETR VISCERA FISH MED 3204

## (undated) DEVICE — DRAPE FLUID WARMING 52 X 60" ORS-321

## (undated) DEVICE — SUCTION TIP YANKAUER VIAGUARD W/SLEEVE SMP-2006-001SS

## (undated) DEVICE — SU PDS II 1 TP-1 48" Z880G

## (undated) DEVICE — GUIDEWIRE ACROBAT 2X035"X260CM STR AWG2-35-260 G47614

## (undated) DEVICE — KIT RIGHT HEART CATH 60130719

## (undated) DEVICE — TUBING SUCTION 6"X3/16" N56A

## (undated) DEVICE — SU SILK 0 TIE 6X30" A306H

## (undated) DEVICE — SU PROLENE 3-0 SH-1 30" 8762H

## (undated) DEVICE — STPL RELOAD LINEAR 30X2.5MM VASC XR30V

## (undated) DEVICE — SU VICRYL 0 CT-1 CR 8X18" J740D

## (undated) DEVICE — SKIN STAPLER

## (undated) RX ORDER — LIDOCAINE HYDROCHLORIDE 10 MG/ML
INJECTION, SOLUTION EPIDURAL; INFILTRATION; INTRACAUDAL; PERINEURAL
Status: DISPENSED
Start: 2024-03-15

## (undated) RX ORDER — OXYCODONE HYDROCHLORIDE 5 MG/1
TABLET ORAL
Status: DISPENSED
Start: 2024-09-18

## (undated) RX ORDER — FENTANYL CITRATE 50 UG/ML
INJECTION, SOLUTION INTRAMUSCULAR; INTRAVENOUS
Status: DISPENSED
Start: 2024-10-22

## (undated) RX ORDER — NITROGLYCERIN 0.4 MG/1
TABLET SUBLINGUAL
Status: DISPENSED
Start: 2024-01-19

## (undated) RX ORDER — ONDANSETRON 2 MG/ML
INJECTION INTRAMUSCULAR; INTRAVENOUS
Status: DISPENSED
Start: 2024-08-13

## (undated) RX ORDER — FENTANYL CITRATE 50 UG/ML
INJECTION, SOLUTION INTRAMUSCULAR; INTRAVENOUS
Status: DISPENSED
Start: 2024-08-13

## (undated) RX ORDER — FENTANYL CITRATE 50 UG/ML
INJECTION, SOLUTION INTRAMUSCULAR; INTRAVENOUS
Status: DISPENSED
Start: 2024-03-15

## (undated) RX ORDER — IVABRADINE 5 MG/1
TABLET, FILM COATED ORAL
Status: DISPENSED
Start: 2024-01-19

## (undated) RX ORDER — PROPOFOL 10 MG/ML
INJECTION, EMULSION INTRAVENOUS
Status: DISPENSED
Start: 2024-12-17

## (undated) RX ORDER — FENTANYL CITRATE 50 UG/ML
INJECTION, SOLUTION INTRAMUSCULAR; INTRAVENOUS
Status: DISPENSED
Start: 2024-09-18

## (undated) RX ORDER — ONDANSETRON 2 MG/ML
INJECTION INTRAMUSCULAR; INTRAVENOUS
Status: DISPENSED
Start: 2025-02-13

## (undated) RX ORDER — ONDANSETRON 2 MG/ML
INJECTION INTRAMUSCULAR; INTRAVENOUS
Status: DISPENSED
Start: 2024-10-22

## (undated) RX ORDER — CIPROFLOXACIN 2 MG/ML
INJECTION, SOLUTION INTRAVENOUS
Status: DISPENSED
Start: 2024-12-17

## (undated) RX ORDER — HYDROMORPHONE HYDROCHLORIDE 1 MG/ML
INJECTION, SOLUTION INTRAMUSCULAR; INTRAVENOUS; SUBCUTANEOUS
Status: DISPENSED
Start: 2024-09-18

## (undated) RX ORDER — HYDROMORPHONE HYDROCHLORIDE 1 MG/ML
INJECTION, SOLUTION INTRAMUSCULAR; INTRAVENOUS; SUBCUTANEOUS
Status: DISPENSED
Start: 2024-03-05

## (undated) RX ORDER — DEXAMETHASONE SODIUM PHOSPHATE 4 MG/ML
INJECTION, SOLUTION INTRA-ARTICULAR; INTRALESIONAL; INTRAMUSCULAR; INTRAVENOUS; SOFT TISSUE
Status: DISPENSED
Start: 2024-09-18

## (undated) RX ORDER — DEXAMETHASONE SODIUM PHOSPHATE 4 MG/ML
INJECTION, SOLUTION INTRA-ARTICULAR; INTRALESIONAL; INTRAMUSCULAR; INTRAVENOUS; SOFT TISSUE
Status: DISPENSED
Start: 2024-12-17

## (undated) RX ORDER — FENTANYL CITRATE-0.9 % NACL/PF 10 MCG/ML
PLASTIC BAG, INJECTION (ML) INTRAVENOUS
Status: DISPENSED
Start: 2024-08-13

## (undated) RX ORDER — PROPOFOL 10 MG/ML
INJECTION, EMULSION INTRAVENOUS
Status: DISPENSED
Start: 2024-10-22

## (undated) RX ORDER — WATER 10 ML/10ML
INJECTION INTRAMUSCULAR; INTRAVENOUS; SUBCUTANEOUS
Status: DISPENSED
Start: 2024-08-13

## (undated) RX ORDER — LIDOCAINE HYDROCHLORIDE 10 MG/ML
INJECTION, SOLUTION EPIDURAL; INFILTRATION; INTRACAUDAL; PERINEURAL
Status: DISPENSED
Start: 2024-09-18

## (undated) RX ORDER — FENTANYL CITRATE-0.9 % NACL/PF 10 MCG/ML
PLASTIC BAG, INJECTION (ML) INTRAVENOUS
Status: DISPENSED
Start: 2024-09-18

## (undated) RX ORDER — DEXAMETHASONE SODIUM PHOSPHATE 4 MG/ML
INJECTION, SOLUTION INTRA-ARTICULAR; INTRALESIONAL; INTRAMUSCULAR; INTRAVENOUS; SOFT TISSUE
Status: DISPENSED
Start: 2024-08-13

## (undated) RX ORDER — FENTANYL CITRATE 50 UG/ML
INJECTION, SOLUTION INTRAMUSCULAR; INTRAVENOUS
Status: DISPENSED
Start: 2024-12-17

## (undated) RX ORDER — MUPIROCIN 20 MG/G
OINTMENT TOPICAL
Status: DISPENSED
Start: 2024-12-17

## (undated) RX ORDER — VERAPAMIL HYDROCHLORIDE 2.5 MG/ML
INJECTION, SOLUTION INTRAVENOUS
Status: DISPENSED
Start: 2024-03-05

## (undated) RX ORDER — CIPROFLOXACIN 2 MG/ML
INJECTION, SOLUTION INTRAVENOUS
Status: DISPENSED
Start: 2025-02-13

## (undated) RX ORDER — LIDOCAINE 40 MG/G
CREAM TOPICAL
Status: DISPENSED
Start: 2024-01-30

## (undated) RX ORDER — HYDROMORPHONE HCL IN WATER/PF 6 MG/30 ML
PATIENT CONTROLLED ANALGESIA SYRINGE INTRAVENOUS
Status: DISPENSED
Start: 2024-09-18

## (undated) RX ORDER — ONDANSETRON 2 MG/ML
INJECTION INTRAMUSCULAR; INTRAVENOUS
Status: DISPENSED
Start: 2024-12-17

## (undated) RX ORDER — DEXAMETHASONE SODIUM PHOSPHATE 4 MG/ML
INJECTION, SOLUTION INTRA-ARTICULAR; INTRALESIONAL; INTRAMUSCULAR; INTRAVENOUS; SOFT TISSUE
Status: DISPENSED
Start: 2024-10-22

## (undated) RX ORDER — HEPARIN SODIUM 1000 [USP'U]/ML
INJECTION, SOLUTION INTRAVENOUS; SUBCUTANEOUS
Status: DISPENSED
Start: 2024-03-05

## (undated) RX ORDER — LIDOCAINE HYDROCHLORIDE 10 MG/ML
INJECTION, SOLUTION EPIDURAL; INFILTRATION; INTRACAUDAL; PERINEURAL
Status: DISPENSED
Start: 2024-04-01

## (undated) RX ORDER — METOPROLOL TARTRATE 100 MG
TABLET ORAL
Status: DISPENSED
Start: 2024-01-19

## (undated) RX ORDER — FENTANYL CITRATE 50 UG/ML
INJECTION, SOLUTION INTRAMUSCULAR; INTRAVENOUS
Status: DISPENSED
Start: 2024-03-05

## (undated) RX ORDER — FENTANYL CITRATE 50 UG/ML
INJECTION, SOLUTION INTRAMUSCULAR; INTRAVENOUS
Status: DISPENSED
Start: 2025-02-13

## (undated) RX ORDER — CALCIUM CHLORIDE 100 MG/ML
INJECTION INTRAVENOUS; INTRAVENTRICULAR
Status: DISPENSED
Start: 2024-03-05

## (undated) RX ORDER — FENTANYL CITRATE 50 UG/ML
INJECTION, SOLUTION INTRAMUSCULAR; INTRAVENOUS
Status: DISPENSED
Start: 2024-04-01

## (undated) RX ORDER — FENTANYL CITRATE-0.9 % NACL/PF 10 MCG/ML
PLASTIC BAG, INJECTION (ML) INTRAVENOUS
Status: DISPENSED
Start: 2024-10-22

## (undated) RX ORDER — PAPAVERINE HYDROCHLORIDE 30 MG/ML
INJECTION INTRAMUSCULAR; INTRAVENOUS
Status: DISPENSED
Start: 2024-03-05

## (undated) RX ORDER — DIPHENHYDRAMINE HYDROCHLORIDE 50 MG/ML
INJECTION INTRAMUSCULAR; INTRAVENOUS
Status: DISPENSED
Start: 2024-07-12

## (undated) RX ORDER — BUPIVACAINE HYDROCHLORIDE AND EPINEPHRINE 2.5; 5 MG/ML; UG/ML
INJECTION, SOLUTION EPIDURAL; INFILTRATION; INTRACAUDAL; PERINEURAL
Status: DISPENSED
Start: 2024-09-18

## (undated) RX ORDER — ONDANSETRON 2 MG/ML
INJECTION INTRAMUSCULAR; INTRAVENOUS
Status: DISPENSED
Start: 2024-09-18

## (undated) RX ORDER — FENTANYL CITRATE 50 UG/ML
INJECTION, SOLUTION INTRAMUSCULAR; INTRAVENOUS
Status: DISPENSED
Start: 2024-07-12

## (undated) RX ORDER — PROPOFOL 10 MG/ML
INJECTION, EMULSION INTRAVENOUS
Status: DISPENSED
Start: 2024-08-13